# Patient Record
Sex: FEMALE | Race: BLACK OR AFRICAN AMERICAN | Employment: OTHER | URBAN - METROPOLITAN AREA
[De-identification: names, ages, dates, MRNs, and addresses within clinical notes are randomized per-mention and may not be internally consistent; named-entity substitution may affect disease eponyms.]

---

## 2017-02-16 PROBLEM — I50.33 ACUTE ON CHRONIC DIASTOLIC CHF (CONGESTIVE HEART FAILURE) (HCC): Status: ACTIVE | Noted: 2017-02-16

## 2017-02-17 PROBLEM — R79.89 ELEVATED TROPONIN: Status: ACTIVE | Noted: 2017-02-17

## 2017-02-17 PROBLEM — R77.8 ELEVATED TROPONIN: Status: ACTIVE | Noted: 2017-02-17

## 2017-02-17 PROBLEM — D61.818 PANCYTOPENIA (HCC): Status: ACTIVE | Noted: 2017-02-17

## 2017-02-22 PROBLEM — E11.29 TYPE II DIABETES MELLITUS WITH RENAL MANIFESTATIONS (HCC): Status: ACTIVE | Noted: 2017-02-22

## 2017-11-07 PROBLEM — E11.649 HYPOGLYCEMIA UNAWARENESS ASSOCIATED WITH TYPE 2 DIABETES MELLITUS (HCC): Status: ACTIVE | Noted: 2017-11-07

## 2017-11-07 PROBLEM — E10.649 HYPOGLYCEMIA UNAWARENESS IN TYPE 1 DIABETES MELLITUS (HCC): Status: ACTIVE | Noted: 2017-11-07

## 2017-11-09 PROBLEM — E87.5 HYPERKALEMIA, DIMINISHED RENAL EXCRETION: Status: ACTIVE | Noted: 2017-11-09

## 2018-04-05 RX ORDER — SEVELAMER CARBONATE 800 MG/1
1 TABLET, FILM COATED ORAL
COMMUNITY
End: 2021-05-19

## 2018-04-10 ENCOUNTER — HOSPITAL ENCOUNTER (OUTPATIENT)
Age: 63
Setting detail: OUTPATIENT SURGERY
Discharge: HOME | End: 2018-04-10
Attending: OPHTHALMOLOGY | Admitting: OPHTHALMOLOGY
Payer: MEDICARE

## 2018-04-10 ENCOUNTER — ANESTHESIA EVENT (OUTPATIENT)
Dept: OPERATING ROOM | Age: 63
End: 2018-04-10
Payer: MEDICARE

## 2018-04-10 ENCOUNTER — ANESTHESIA (OUTPATIENT)
Dept: OPERATING ROOM | Age: 63
End: 2018-04-10
Payer: MEDICARE

## 2018-04-10 VITALS — SYSTOLIC BLOOD PRESSURE: 83 MMHG | OXYGEN SATURATION: 93 % | DIASTOLIC BLOOD PRESSURE: 39 MMHG

## 2018-04-10 VITALS
DIASTOLIC BLOOD PRESSURE: 50 MMHG | RESPIRATION RATE: 16 BRPM | WEIGHT: 176 LBS | HEIGHT: 70 IN | OXYGEN SATURATION: 94 % | BODY MASS INDEX: 25.2 KG/M2 | HEART RATE: 70 BPM | SYSTOLIC BLOOD PRESSURE: 115 MMHG | TEMPERATURE: 98 F

## 2018-04-10 LAB
METER GLUCOSE: 114 MG/DL (ref 70–110)
METER GLUCOSE: 88 MG/DL (ref 70–110)
POTASSIUM SERPL-SCNC: 4.8 MMOL/L (ref 3.5–5)

## 2018-04-10 PROCEDURE — 2580000003 HC RX 258: Performed by: OPHTHALMOLOGY

## 2018-04-10 PROCEDURE — 2500000003 HC RX 250 WO HCPCS: Performed by: NURSE ANESTHETIST, CERTIFIED REGISTERED

## 2018-04-10 PROCEDURE — 2720000010 HC SURG SUPPLY STERILE: Performed by: OPHTHALMOLOGY

## 2018-04-10 PROCEDURE — 3600000013 HC SURGERY LEVEL 3 ADDTL 15MIN: Performed by: OPHTHALMOLOGY

## 2018-04-10 PROCEDURE — 6370000000 HC RX 637 (ALT 250 FOR IP): Performed by: OPHTHALMOLOGY

## 2018-04-10 PROCEDURE — 6360000002 HC RX W HCPCS: Performed by: NURSE ANESTHETIST, CERTIFIED REGISTERED

## 2018-04-10 PROCEDURE — 82962 GLUCOSE BLOOD TEST: CPT

## 2018-04-10 PROCEDURE — 3700000000 HC ANESTHESIA ATTENDED CARE: Performed by: OPHTHALMOLOGY

## 2018-04-10 PROCEDURE — 7100000011 HC PHASE II RECOVERY - ADDTL 15 MIN: Performed by: OPHTHALMOLOGY

## 2018-04-10 PROCEDURE — 2500000003 HC RX 250 WO HCPCS: Performed by: OPHTHALMOLOGY

## 2018-04-10 PROCEDURE — 3700000001 HC ADD 15 MINUTES (ANESTHESIA): Performed by: OPHTHALMOLOGY

## 2018-04-10 PROCEDURE — 3600000003 HC SURGERY LEVEL 3 BASE: Performed by: OPHTHALMOLOGY

## 2018-04-10 PROCEDURE — 7100000010 HC PHASE II RECOVERY - FIRST 15 MIN: Performed by: OPHTHALMOLOGY

## 2018-04-10 PROCEDURE — 36415 COLL VENOUS BLD VENIPUNCTURE: CPT

## 2018-04-10 PROCEDURE — 6360000002 HC RX W HCPCS: Performed by: OPHTHALMOLOGY

## 2018-04-10 PROCEDURE — 84132 ASSAY OF SERUM POTASSIUM: CPT

## 2018-04-10 RX ORDER — ONDANSETRON 2 MG/ML
INJECTION INTRAMUSCULAR; INTRAVENOUS PRN
Status: DISCONTINUED | OUTPATIENT
Start: 2018-04-10 | End: 2018-04-10 | Stop reason: SDUPTHER

## 2018-04-10 RX ORDER — CEFAZOLIN SODIUM 1 G/3ML
INJECTION, POWDER, FOR SOLUTION INTRAMUSCULAR; INTRAVENOUS PRN
Status: DISCONTINUED | OUTPATIENT
Start: 2018-04-10 | End: 2018-04-10 | Stop reason: HOSPADM

## 2018-04-10 RX ORDER — PROPOFOL 10 MG/ML
INJECTION, EMULSION INTRAVENOUS PRN
Status: DISCONTINUED | OUTPATIENT
Start: 2018-04-10 | End: 2018-04-10 | Stop reason: SDUPTHER

## 2018-04-10 RX ORDER — ONDANSETRON 2 MG/ML
4 INJECTION INTRAMUSCULAR; INTRAVENOUS EVERY 6 HOURS PRN
Status: CANCELLED | OUTPATIENT
Start: 2018-04-10

## 2018-04-10 RX ORDER — SODIUM CHLORIDE 0.9 % (FLUSH) 0.9 %
10 SYRINGE (ML) INJECTION PRN
Status: CANCELLED | OUTPATIENT
Start: 2018-04-10

## 2018-04-10 RX ORDER — DEXTROSE MONOHYDRATE 25 G/50ML
12.5 INJECTION, SOLUTION INTRAVENOUS ONCE
Status: DISCONTINUED | OUTPATIENT
Start: 2018-04-10 | End: 2018-04-10 | Stop reason: HOSPADM

## 2018-04-10 RX ORDER — DEXAMETHASONE SODIUM PHOSPHATE 10 MG/ML
INJECTION, SOLUTION INTRAMUSCULAR; INTRAVENOUS PRN
Status: DISCONTINUED | OUTPATIENT
Start: 2018-04-10 | End: 2018-04-10 | Stop reason: HOSPADM

## 2018-04-10 RX ORDER — ATROPINE SULFATE 10 MG/ML
SOLUTION/ DROPS OPHTHALMIC PRN
Status: DISCONTINUED | OUTPATIENT
Start: 2018-04-10 | End: 2018-04-10 | Stop reason: HOSPADM

## 2018-04-10 RX ORDER — HEPARIN SODIUM (PORCINE) LOCK FLUSH IV SOLN 100 UNIT/ML 100 UNIT/ML
SOLUTION INTRAVENOUS
Status: DISCONTINUED
Start: 2018-04-10 | End: 2018-04-10 | Stop reason: HOSPADM

## 2018-04-10 RX ORDER — LIDOCAINE HYDROCHLORIDE 20 MG/ML
INJECTION, SOLUTION INFILTRATION; PERINEURAL PRN
Status: DISCONTINUED | OUTPATIENT
Start: 2018-04-10 | End: 2018-04-10 | Stop reason: SDUPTHER

## 2018-04-10 RX ORDER — ACETAMINOPHEN 325 MG/1
650 TABLET ORAL EVERY 4 HOURS PRN
Status: CANCELLED | OUTPATIENT
Start: 2018-04-10

## 2018-04-10 RX ORDER — SODIUM CHLORIDE 0.9 % (FLUSH) 0.9 %
10 SYRINGE (ML) INJECTION EVERY 12 HOURS SCHEDULED
Status: CANCELLED | OUTPATIENT
Start: 2018-04-10

## 2018-04-10 RX ORDER — SODIUM CHLORIDE 0.9 % (FLUSH) 0.9 %
10 SYRINGE (ML) INJECTION EVERY 12 HOURS SCHEDULED
Status: DISCONTINUED | OUTPATIENT
Start: 2018-04-10 | End: 2018-04-10 | Stop reason: HOSPADM

## 2018-04-10 RX ORDER — SODIUM CHLORIDE 0.9 % (FLUSH) 0.9 %
10 SYRINGE (ML) INJECTION PRN
Status: DISCONTINUED | OUTPATIENT
Start: 2018-04-10 | End: 2018-04-10 | Stop reason: HOSPADM

## 2018-04-10 RX ORDER — FLURBIPROFEN SODIUM 0.3 MG/ML
1 SOLUTION/ DROPS OPHTHALMIC
Status: COMPLETED | OUTPATIENT
Start: 2018-04-10 | End: 2018-04-10

## 2018-04-10 RX ORDER — ATROPINE SULFATE 10 MG/ML
1 SOLUTION/ DROPS OPHTHALMIC
Status: COMPLETED | OUTPATIENT
Start: 2018-04-10 | End: 2018-04-10

## 2018-04-10 RX ORDER — SODIUM CHLORIDE 9 MG/ML
INJECTION, SOLUTION INTRAVENOUS CONTINUOUS
Status: DISCONTINUED | OUTPATIENT
Start: 2018-04-10 | End: 2018-04-10 | Stop reason: HOSPADM

## 2018-04-10 RX ORDER — PHENYLEPHRINE HYDROCHLORIDE 100 MG/ML
1 SOLUTION/ DROPS OPHTHALMIC
Status: COMPLETED | OUTPATIENT
Start: 2018-04-10 | End: 2018-04-10

## 2018-04-10 RX ORDER — DEXTROSE MONOHYDRATE 25 G/50ML
INJECTION, SOLUTION INTRAVENOUS
Status: DISCONTINUED
Start: 2018-04-10 | End: 2018-04-10 | Stop reason: HOSPADM

## 2018-04-10 RX ADMIN — PROPOFOL 80 MG: 10 INJECTION, EMULSION INTRAVENOUS at 08:46

## 2018-04-10 RX ADMIN — Medication 1 DROP: at 06:45

## 2018-04-10 RX ADMIN — Medication 1 DROP: at 06:50

## 2018-04-10 RX ADMIN — Medication 1 DROP: at 06:55

## 2018-04-10 RX ADMIN — ONDANSETRON 4 MG: 2 INJECTION, SOLUTION INTRAMUSCULAR; INTRAVENOUS at 08:47

## 2018-04-10 RX ADMIN — ATROPINE SULFATE 1 DROP: 10 SOLUTION/ DROPS OPHTHALMIC at 06:55

## 2018-04-10 RX ADMIN — ATROPINE SULFATE 1 DROP: 10 SOLUTION/ DROPS OPHTHALMIC at 06:50

## 2018-04-10 RX ADMIN — PHENYLEPHRINE HYDROCHLORIDE 1 DROP: 100 SOLUTION/ DROPS OPHTHALMIC at 06:55

## 2018-04-10 RX ADMIN — SODIUM CHLORIDE: 9 INJECTION, SOLUTION INTRAVENOUS at 08:45

## 2018-04-10 RX ADMIN — ATROPINE SULFATE 1 DROP: 10 SOLUTION/ DROPS OPHTHALMIC at 06:45

## 2018-04-10 RX ADMIN — LIDOCAINE HYDROCHLORIDE 40 MG: 20 INJECTION, SOLUTION INFILTRATION; PERINEURAL at 08:46

## 2018-04-10 RX ADMIN — PHENYLEPHRINE HYDROCHLORIDE 1 DROP: 100 SOLUTION/ DROPS OPHTHALMIC at 06:50

## 2018-04-10 RX ADMIN — PHENYLEPHRINE HYDROCHLORIDE 1 DROP: 100 SOLUTION/ DROPS OPHTHALMIC at 06:45

## 2018-04-10 ASSESSMENT — PAIN SCALES - GENERAL
PAINLEVEL_OUTOF10: 0
PAINLEVEL_OUTOF10: 0

## 2018-05-08 RX ORDER — DOCUSATE SODIUM 100 MG/1
CAPSULE, LIQUID FILLED ORAL
Refills: 6 | COMMUNITY
Start: 2018-03-08 | End: 2018-06-21

## 2018-05-13 ENCOUNTER — PREP FOR PROCEDURE (OUTPATIENT)
Dept: VASCULAR SURGERY | Age: 63
End: 2018-05-13

## 2018-05-13 RX ORDER — SODIUM CHLORIDE 0.9 % (FLUSH) 0.9 %
10 SYRINGE (ML) INJECTION PRN
Status: CANCELLED | OUTPATIENT
Start: 2018-05-13

## 2018-05-13 RX ORDER — SODIUM CHLORIDE 9 MG/ML
INJECTION, SOLUTION INTRAVENOUS CONTINUOUS
Status: CANCELLED | OUTPATIENT
Start: 2018-05-13

## 2018-05-13 RX ORDER — SODIUM CHLORIDE 0.9 % (FLUSH) 0.9 %
10 SYRINGE (ML) INJECTION EVERY 12 HOURS SCHEDULED
Status: CANCELLED | OUTPATIENT
Start: 2018-05-13

## 2018-05-14 ENCOUNTER — ANESTHESIA EVENT (OUTPATIENT)
Dept: OPERATING ROOM | Age: 63
End: 2018-05-14
Payer: MEDICARE

## 2018-05-15 ENCOUNTER — HOSPITAL ENCOUNTER (OUTPATIENT)
Age: 63
Setting detail: OUTPATIENT SURGERY
Discharge: HOME | End: 2018-05-15
Attending: SPECIALIST | Admitting: SPECIALIST
Payer: MEDICARE

## 2018-05-15 ENCOUNTER — ANESTHESIA (OUTPATIENT)
Dept: OPERATING ROOM | Age: 63
End: 2018-05-15
Payer: MEDICARE

## 2018-05-15 VITALS
TEMPERATURE: 97.5 F | HEIGHT: 70 IN | BODY MASS INDEX: 25.2 KG/M2 | WEIGHT: 176 LBS | SYSTOLIC BLOOD PRESSURE: 146 MMHG | DIASTOLIC BLOOD PRESSURE: 72 MMHG | HEART RATE: 62 BPM | OXYGEN SATURATION: 95 % | RESPIRATION RATE: 18 BRPM

## 2018-05-15 VITALS — DIASTOLIC BLOOD PRESSURE: 66 MMHG | OXYGEN SATURATION: 100 % | SYSTOLIC BLOOD PRESSURE: 137 MMHG

## 2018-05-15 DIAGNOSIS — I77.0 AVF (ARTERIOVENOUS FISTULA) (HCC): Primary | ICD-10-CM

## 2018-05-15 LAB
ANION GAP SERPL CALCULATED.3IONS-SCNC: 13 MMOL/L (ref 7–16)
APTT: 30.9 SEC (ref 24.5–35.1)
BASOPHILS ABSOLUTE: 0.04 E9/L (ref 0–0.2)
BASOPHILS RELATIVE PERCENT: 0.9 % (ref 0–2)
BUN BLDV-MCNC: 47 MG/DL (ref 8–23)
CALCIUM SERPL-MCNC: 8.5 MG/DL (ref 8.6–10.2)
CHLORIDE BLD-SCNC: 95 MMOL/L (ref 98–107)
CO2: 31 MMOL/L (ref 22–29)
CREAT SERPL-MCNC: 4.2 MG/DL (ref 0.5–1)
EKG ATRIAL RATE: 62 BPM
EKG P AXIS: 28 DEGREES
EKG P-R INTERVAL: 182 MS
EKG Q-T INTERVAL: 452 MS
EKG QRS DURATION: 84 MS
EKG QTC CALCULATION (BAZETT): 458 MS
EKG R AXIS: -4 DEGREES
EKG T AXIS: 45 DEGREES
EKG VENTRICULAR RATE: 62 BPM
EOSINOPHILS ABSOLUTE: 0.15 E9/L (ref 0.05–0.5)
EOSINOPHILS RELATIVE PERCENT: 3.4 % (ref 0–6)
GFR AFRICAN AMERICAN: 13
GFR NON-AFRICAN AMERICAN: 13 ML/MIN/1.73
GLUCOSE BLD-MCNC: 98 MG/DL (ref 74–109)
HCT VFR BLD CALC: 34.4 % (ref 34–48)
HEMOGLOBIN: 10.5 G/DL (ref 11.5–15.5)
IMMATURE GRANULOCYTES #: 0.02 E9/L
IMMATURE GRANULOCYTES %: 0.5 % (ref 0–5)
INR BLD: 0.9
LYMPHOCYTES ABSOLUTE: 1.32 E9/L (ref 1.5–4)
LYMPHOCYTES RELATIVE PERCENT: 30.3 % (ref 20–42)
MCH RBC QN AUTO: 27.1 PG (ref 26–35)
MCHC RBC AUTO-ENTMCNC: 30.5 % (ref 32–34.5)
MCV RBC AUTO: 88.9 FL (ref 80–99.9)
METER GLUCOSE: 101 MG/DL (ref 70–110)
MONOCYTES ABSOLUTE: 0.69 E9/L (ref 0.1–0.95)
MONOCYTES RELATIVE PERCENT: 15.8 % (ref 2–12)
NEUTROPHILS ABSOLUTE: 2.14 E9/L (ref 1.8–7.3)
NEUTROPHILS RELATIVE PERCENT: 49.1 % (ref 43–80)
PDW BLD-RTO: 15.5 FL (ref 11.5–15)
PLATELET # BLD: 193 E9/L (ref 130–450)
PMV BLD AUTO: 10.5 FL (ref 7–12)
POTASSIUM REFLEX MAGNESIUM: 4.8 MMOL/L (ref 3.5–5)
PROTHROMBIN TIME: 10.8 SEC (ref 9.3–12.4)
RBC # BLD: 3.87 E12/L (ref 3.5–5.5)
SODIUM BLD-SCNC: 139 MMOL/L (ref 132–146)
WBC # BLD: 4.4 E9/L (ref 4.5–11.5)

## 2018-05-15 PROCEDURE — 7100000011 HC PHASE II RECOVERY - ADDTL 15 MIN: Performed by: SPECIALIST

## 2018-05-15 PROCEDURE — 3600000013 HC SURGERY LEVEL 3 ADDTL 15MIN: Performed by: SPECIALIST

## 2018-05-15 PROCEDURE — 6360000002 HC RX W HCPCS: Performed by: SPECIALIST

## 2018-05-15 PROCEDURE — 6360000002 HC RX W HCPCS

## 2018-05-15 PROCEDURE — 3700000000 HC ANESTHESIA ATTENDED CARE: Performed by: SPECIALIST

## 2018-05-15 PROCEDURE — 6370000000 HC RX 637 (ALT 250 FOR IP)

## 2018-05-15 PROCEDURE — 6360000002 HC RX W HCPCS: Performed by: ANESTHESIOLOGY

## 2018-05-15 PROCEDURE — 2580000003 HC RX 258: Performed by: SPECIALIST

## 2018-05-15 PROCEDURE — 85610 PROTHROMBIN TIME: CPT

## 2018-05-15 PROCEDURE — 36415 COLL VENOUS BLD VENIPUNCTURE: CPT

## 2018-05-15 PROCEDURE — 85730 THROMBOPLASTIN TIME PARTIAL: CPT

## 2018-05-15 PROCEDURE — 64415 NJX AA&/STRD BRCH PLXS IMG: CPT | Performed by: ANESTHESIOLOGY

## 2018-05-15 PROCEDURE — 2500000003 HC RX 250 WO HCPCS: Performed by: SPECIALIST

## 2018-05-15 PROCEDURE — 2500000003 HC RX 250 WO HCPCS

## 2018-05-15 PROCEDURE — 3600000003 HC SURGERY LEVEL 3 BASE: Performed by: SPECIALIST

## 2018-05-15 PROCEDURE — 85025 COMPLETE CBC W/AUTO DIFF WBC: CPT

## 2018-05-15 PROCEDURE — 3700000001 HC ADD 15 MINUTES (ANESTHESIA): Performed by: SPECIALIST

## 2018-05-15 PROCEDURE — 80048 BASIC METABOLIC PNL TOTAL CA: CPT

## 2018-05-15 PROCEDURE — 82962 GLUCOSE BLOOD TEST: CPT

## 2018-05-15 PROCEDURE — 7100000010 HC PHASE II RECOVERY - FIRST 15 MIN: Performed by: SPECIALIST

## 2018-05-15 RX ORDER — MIDAZOLAM HYDROCHLORIDE 1 MG/ML
1 INJECTION INTRAMUSCULAR; INTRAVENOUS EVERY 5 MIN PRN
Status: DISCONTINUED | OUTPATIENT
Start: 2018-05-15 | End: 2018-05-15 | Stop reason: HOSPADM

## 2018-05-15 RX ORDER — GLYCOPYRROLATE 0.6MG/3ML
SYRINGE (ML) INTRAVENOUS PRN
Status: DISCONTINUED | OUTPATIENT
Start: 2018-05-15 | End: 2018-05-15 | Stop reason: SDUPTHER

## 2018-05-15 RX ORDER — MORPHINE SULFATE 2 MG/ML
1 INJECTION, SOLUTION INTRAMUSCULAR; INTRAVENOUS EVERY 5 MIN PRN
Status: DISCONTINUED | OUTPATIENT
Start: 2018-05-15 | End: 2018-05-15 | Stop reason: HOSPADM

## 2018-05-15 RX ORDER — FENTANYL CITRATE 50 UG/ML
INJECTION, SOLUTION INTRAMUSCULAR; INTRAVENOUS PRN
Status: DISCONTINUED | OUTPATIENT
Start: 2018-05-15 | End: 2018-05-15 | Stop reason: SDUPTHER

## 2018-05-15 RX ORDER — HYDROCODONE BITARTRATE AND ACETAMINOPHEN 5; 325 MG/1; MG/1
1 TABLET ORAL
Status: COMPLETED | OUTPATIENT
Start: 2018-05-15 | End: 2018-05-15

## 2018-05-15 RX ORDER — PROMETHAZINE HYDROCHLORIDE 25 MG/ML
25 INJECTION, SOLUTION INTRAMUSCULAR; INTRAVENOUS PRN
Status: DISCONTINUED | OUTPATIENT
Start: 2018-05-15 | End: 2018-05-15 | Stop reason: HOSPADM

## 2018-05-15 RX ORDER — LIDOCAINE HYDROCHLORIDE 10 MG/ML
INJECTION, SOLUTION INFILTRATION; PERINEURAL PRN
Status: DISCONTINUED | OUTPATIENT
Start: 2018-05-15 | End: 2018-05-15 | Stop reason: HOSPADM

## 2018-05-15 RX ORDER — HYDROCODONE BITARTRATE AND ACETAMINOPHEN 5; 325 MG/1; MG/1
TABLET ORAL
Status: COMPLETED
Start: 2018-05-15 | End: 2018-05-15

## 2018-05-15 RX ORDER — MEPERIDINE HYDROCHLORIDE 50 MG/ML
12.5 INJECTION INTRAMUSCULAR; INTRAVENOUS; SUBCUTANEOUS EVERY 5 MIN PRN
Status: DISCONTINUED | OUTPATIENT
Start: 2018-05-15 | End: 2018-05-15 | Stop reason: HOSPADM

## 2018-05-15 RX ORDER — SODIUM CHLORIDE 9 MG/ML
INJECTION, SOLUTION INTRAVENOUS CONTINUOUS
Status: DISCONTINUED | OUTPATIENT
Start: 2018-05-15 | End: 2018-05-15 | Stop reason: HOSPADM

## 2018-05-15 RX ORDER — LABETALOL HYDROCHLORIDE 5 MG/ML
5 INJECTION, SOLUTION INTRAVENOUS EVERY 10 MIN PRN
Status: DISCONTINUED | OUTPATIENT
Start: 2018-05-15 | End: 2018-05-15 | Stop reason: HOSPADM

## 2018-05-15 RX ORDER — HYDROCODONE BITARTRATE AND ACETAMINOPHEN 5; 325 MG/1; MG/1
1 TABLET ORAL EVERY 6 HOURS PRN
Qty: 10 TABLET | Refills: 0 | Status: SHIPPED | OUTPATIENT
Start: 2018-05-15 | End: 2018-05-18

## 2018-05-15 RX ORDER — PROPOFOL 10 MG/ML
INJECTION, EMULSION INTRAVENOUS CONTINUOUS PRN
Status: DISCONTINUED | OUTPATIENT
Start: 2018-05-15 | End: 2018-05-15 | Stop reason: SDUPTHER

## 2018-05-15 RX ORDER — SODIUM CHLORIDE 0.9 % (FLUSH) 0.9 %
10 SYRINGE (ML) INJECTION PRN
Status: DISCONTINUED | OUTPATIENT
Start: 2018-05-15 | End: 2018-05-15 | Stop reason: HOSPADM

## 2018-05-15 RX ORDER — SODIUM CHLORIDE 0.9 % (FLUSH) 0.9 %
10 SYRINGE (ML) INJECTION EVERY 12 HOURS SCHEDULED
Status: DISCONTINUED | OUTPATIENT
Start: 2018-05-15 | End: 2018-05-15 | Stop reason: HOSPADM

## 2018-05-15 RX ORDER — HEPARIN SODIUM 10000 [USP'U]/ML
INJECTION, SOLUTION INTRAVENOUS; SUBCUTANEOUS PRN
Status: DISCONTINUED | OUTPATIENT
Start: 2018-05-15 | End: 2018-05-15 | Stop reason: HOSPADM

## 2018-05-15 RX ORDER — MORPHINE SULFATE 2 MG/ML
2 INJECTION, SOLUTION INTRAMUSCULAR; INTRAVENOUS EVERY 5 MIN PRN
Status: DISCONTINUED | OUTPATIENT
Start: 2018-05-15 | End: 2018-05-15 | Stop reason: HOSPADM

## 2018-05-15 RX ORDER — FENTANYL CITRATE 50 UG/ML
50 INJECTION, SOLUTION INTRAMUSCULAR; INTRAVENOUS ONCE
Status: COMPLETED | OUTPATIENT
Start: 2018-05-15 | End: 2018-05-15

## 2018-05-15 RX ADMIN — FENTANYL CITRATE 50 MCG: 50 INJECTION, SOLUTION INTRAMUSCULAR; INTRAVENOUS at 07:33

## 2018-05-15 RX ADMIN — MEPIVACAINE HYDROCHLORIDE 300 MG: 10 INJECTION, SOLUTION EPIDURAL; INFILTRATION at 07:05

## 2018-05-15 RX ADMIN — CEFAZOLIN SODIUM 2 G: 2 SOLUTION INTRAVENOUS at 07:33

## 2018-05-15 RX ADMIN — MIDAZOLAM 1 MG: 1 INJECTION INTRAMUSCULAR; INTRAVENOUS at 07:05

## 2018-05-15 RX ADMIN — FENTANYL CITRATE 50 MCG: 50 INJECTION INTRAMUSCULAR; INTRAVENOUS at 07:05

## 2018-05-15 RX ADMIN — HYDROCODONE BITARTRATE AND ACETAMINOPHEN 1 TABLET: 5; 325 TABLET ORAL at 10:15

## 2018-05-15 RX ADMIN — SODIUM CHLORIDE: 9 INJECTION, SOLUTION INTRAVENOUS at 07:27

## 2018-05-15 RX ADMIN — Medication 0.1 MG: at 08:21

## 2018-05-15 RX ADMIN — PROPOFOL 50 MCG/KG/MIN: 10 INJECTION, EMULSION INTRAVENOUS at 07:33

## 2018-05-15 ASSESSMENT — PULMONARY FUNCTION TESTS
PIF_VALUE: 0
PIF_VALUE: 1
PIF_VALUE: 0

## 2018-05-15 ASSESSMENT — PAIN SCALES - GENERAL
PAINLEVEL_OUTOF10: 7
PAINLEVEL_OUTOF10: 0
PAINLEVEL_OUTOF10: 5
PAINLEVEL_OUTOF10: 0

## 2018-05-15 ASSESSMENT — PAIN - FUNCTIONAL ASSESSMENT: PAIN_FUNCTIONAL_ASSESSMENT: 0-10

## 2018-05-15 ASSESSMENT — PAIN DESCRIPTION - ORIENTATION
ORIENTATION: LEFT;UPPER
ORIENTATION: LEFT;UPPER

## 2018-05-15 ASSESSMENT — PAIN DESCRIPTION - LOCATION
LOCATION: ARM
LOCATION: ARM

## 2018-05-15 ASSESSMENT — PAIN DESCRIPTION - PAIN TYPE
TYPE: NEUROPATHIC PAIN
TYPE: SURGICAL PAIN

## 2018-05-15 ASSESSMENT — PAIN DESCRIPTION - DESCRIPTORS: DESCRIPTORS: ACHING;BURNING

## 2018-05-15 ASSESSMENT — PAIN DESCRIPTION - PROGRESSION: CLINICAL_PROGRESSION: GRADUALLY IMPROVING

## 2018-05-16 PROCEDURE — 76942 ECHO GUIDE FOR BIOPSY: CPT | Performed by: ANESTHESIOLOGY

## 2018-05-24 DIAGNOSIS — I73.9 PVD (PERIPHERAL VASCULAR DISEASE) (HCC): Primary | ICD-10-CM

## 2018-06-21 ENCOUNTER — OFFICE VISIT (OUTPATIENT)
Dept: FAMILY MEDICINE CLINIC | Age: 63
End: 2018-06-21
Payer: COMMERCIAL

## 2018-06-21 VITALS
HEIGHT: 70 IN | SYSTOLIC BLOOD PRESSURE: 98 MMHG | RESPIRATION RATE: 18 BRPM | BODY MASS INDEX: 26.2 KG/M2 | HEART RATE: 67 BPM | DIASTOLIC BLOOD PRESSURE: 47 MMHG | WEIGHT: 183 LBS

## 2018-06-21 DIAGNOSIS — N18.4 CKD (CHRONIC KIDNEY DISEASE) STAGE 4, GFR 15-29 ML/MIN (HCC): ICD-10-CM

## 2018-06-21 DIAGNOSIS — M51.36 LUMBAR DEGENERATIVE DISC DISEASE: Primary | Chronic | ICD-10-CM

## 2018-06-21 DIAGNOSIS — K59.00 CONSTIPATION, UNSPECIFIED CONSTIPATION TYPE: ICD-10-CM

## 2018-06-21 DIAGNOSIS — N18.4 TYPE 2 DIABETES MELLITUS WITH STAGE 4 CHRONIC KIDNEY DISEASE, UNSPECIFIED LONG TERM INSULIN USE STATUS: ICD-10-CM

## 2018-06-21 DIAGNOSIS — E11.22 TYPE 2 DIABETES MELLITUS WITH STAGE 4 CHRONIC KIDNEY DISEASE, UNSPECIFIED LONG TERM INSULIN USE STATUS: ICD-10-CM

## 2018-06-21 DIAGNOSIS — I15.0 RENOVASCULAR HYPERTENSION: ICD-10-CM

## 2018-06-21 LAB — HBA1C MFR BLD: 6.2 %

## 2018-06-21 PROCEDURE — G8419 CALC BMI OUT NRM PARAM NOF/U: HCPCS | Performed by: FAMILY MEDICINE

## 2018-06-21 PROCEDURE — 3044F HG A1C LEVEL LT 7.0%: CPT | Performed by: FAMILY MEDICINE

## 2018-06-21 PROCEDURE — G8598 ASA/ANTIPLAT THER USED: HCPCS | Performed by: FAMILY MEDICINE

## 2018-06-21 PROCEDURE — G8427 DOCREV CUR MEDS BY ELIG CLIN: HCPCS | Performed by: FAMILY MEDICINE

## 2018-06-21 PROCEDURE — 1036F TOBACCO NON-USER: CPT | Performed by: FAMILY MEDICINE

## 2018-06-21 PROCEDURE — 99213 OFFICE O/P EST LOW 20 MIN: CPT | Performed by: FAMILY MEDICINE

## 2018-06-21 PROCEDURE — 2022F DILAT RTA XM EVC RTNOPTHY: CPT | Performed by: FAMILY MEDICINE

## 2018-06-21 PROCEDURE — 3017F COLORECTAL CA SCREEN DOC REV: CPT | Performed by: FAMILY MEDICINE

## 2018-06-21 PROCEDURE — 83036 HEMOGLOBIN GLYCOSYLATED A1C: CPT | Performed by: FAMILY MEDICINE

## 2018-06-21 RX ORDER — METOCLOPRAMIDE 10 MG/1
TABLET ORAL
Refills: 2 | COMMUNITY
Start: 2018-04-23 | End: 2019-04-29

## 2018-06-21 RX ORDER — OMEPRAZOLE 20 MG/1
CAPSULE, DELAYED RELEASE ORAL
Refills: 5 | COMMUNITY
Start: 2018-04-23 | End: 2019-04-29 | Stop reason: SDUPTHER

## 2018-06-21 RX ORDER — INSULIN GLARGINE 100 [IU]/ML
10 INJECTION, SOLUTION SUBCUTANEOUS NIGHTLY
Qty: 1 VIAL | Refills: 3 | Status: CANCELLED | OUTPATIENT
Start: 2018-06-21

## 2018-06-21 ASSESSMENT — PATIENT HEALTH QUESTIONNAIRE - PHQ9
SUM OF ALL RESPONSES TO PHQ9 QUESTIONS 1 & 2: 0
2. FEELING DOWN, DEPRESSED OR HOPELESS: 0
SUM OF ALL RESPONSES TO PHQ QUESTIONS 1-9: 0
1. LITTLE INTEREST OR PLEASURE IN DOING THINGS: 0

## 2018-06-21 ASSESSMENT — ENCOUNTER SYMPTOMS
SHORTNESS OF BREATH: 0
BLOOD IN STOOL: 0
CONSTIPATION: 1
BACK PAIN: 1
WHEEZING: 0
CHEST TIGHTNESS: 0

## 2018-08-01 NOTE — PROGRESS NOTES
[facility]  [address]   Dept: [phone]        Patient:  Germaine Roach,  1955    Date of Service:  18    Requesting Physician:  Selene Rose MD    Reason for Consult:      Patient presents with complaints of bilateral, lower back, left hand, bilateral leg and right shoulder pain that started several years ago    HISTORY OF PRESENT ILLNESS:      Pain is constant with varying degrees of intensity and is described as aching, burning, dull, sharp, stabbing and throbbing. Pain does radiate to both lower extremities. She  has numbness, tingling, weakness of the both lower extremities and does not have bladder or bowel dysfunction. Alleviating factors include: Norco and morphine. Aggravating factors include:  movement, walking, sitting in the chair at dialysis. She has been on anticoagulation medications to include heparin on dialysis and has not been on herbal supplements. She is diabetic. Imaging:   CT lumbar 2014 (pre-surgical) -   IMPRESSION:    1. Postsurgical changes, status post laminectomy at L3-L4 and   L4-L5 and fusion of L3-L4 and L5.   2. No evidence for recurrent disc herniation. No dural or epidural   abscess or hematoma. 3. Pedicle screws are in good position. Alignment is satisfactory. Lumbar myelogram 2014 -   IMPRESSION:   1. Severe spinal canal stenosis in L4-L5 level and moderate to   severe in L3-L4 level as described above.       2. Distraction of the joint space of the facet joints of L4-L5   with hypertrophy which explains the anterolisthesis of L4 in   relation to L5.       3. Degenerative disc disease predominant seen in the L4-L5 level.       4. Encroachment of the neural foramina predominant in the level of   L4-L5 bilaterally. 2015 EMG/NCT:    IMPRESSION:  Electrodiagnostic examination of both arms and both legs disclosed evidence  diagnostic of the followin.  Diffuse sensory motor peripheral diminished renal excretion 11/9/2017    Hypertension     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 ([address] 11/7/2017    Hypoglycemia unawareness in type 1 diabetes mellitus ([address] 11/7/2017    Insulin dependent type 2 diabetes mellitus ([address]     Neuropathy ([address]     feet    Obesity 3/27/2013    Osteomyelitis due to secondary diabetes ([address]     rt great toe with amputation    Patient is Quaker 11/7/2017    Refusal of blood product     patient states she dose not take blood transfusion    Ventricular hypertrophy     Vitreous hemorrhage ([address]     left eye       Past Surgical History:   Procedure Laterality Date    AMPUTATION      right great toe    ANKLE SURGERY      correction on charcot joint of right ankle    CATARACT REMOVAL      bilateral    CHOLECYSTECTOMY      CYSTOSCOPY      DIALYSIS FISTULA CREATION Left 01/31/2018    upper arm/Dr. Devon Magana    ECHO COMPL W DOP COLOR FLOW  2/14/2013         ECHO COMPLETE  9/17/2013         MASTECTOMY      right    OTHER SURGICAL HISTORY  9/27/2011    PPV, membranectomy, laser Right eye    OTHER SURGICAL HISTORY  insertion lumbar drain insertion    10/12/`14    OTHER SURGICAL HISTORY  10/22/15    percutaneous lead placement for spinal cord stimulator    OTHER SURGICAL HISTORY  11/3/15    Spinal; cord stimulator    DC AV ANAST,UP ARM BASILIC VEIN TRANSPOSIT Left 5/15/2018    TRANSPOSITION STAGE II AV FISTULA - LEFT UPPER ARM performed by Salvador Hyman MD at [facility] W/VITRECTOMY ANY METH Left 4/10/2018    PARS PLANA VITRECTOMY 25 GAUGE RETINAL DETACHMENT REPAIR air fluid exchange, endolaser performed by Hailey Brown MD at [address] [place] [address]  11/15/2017    VITRECTOMY Left 04/10/2018    PARS PLANA VITRECTOMY; RETINAL DETACHMENT REPAIR; GAS BUBBLE; LASER LEFT EYE       Prior to Admission medications    Medication Sig Start Date End

## 2018-08-02 ENCOUNTER — OFFICE VISIT (OUTPATIENT)
Dept: PAIN MANAGEMENT | Age: 63
End: 2018-08-02
Payer: COMMERCIAL

## 2018-08-02 VITALS
WEIGHT: 179 LBS | HEART RATE: 78 BPM | OXYGEN SATURATION: 98 % | SYSTOLIC BLOOD PRESSURE: 138 MMHG | RESPIRATION RATE: 18 BRPM | TEMPERATURE: 98.6 F | BODY MASS INDEX: 25.62 KG/M2 | HEIGHT: 70 IN | DIASTOLIC BLOOD PRESSURE: 70 MMHG

## 2018-08-02 DIAGNOSIS — M54.2 CERVICALGIA: ICD-10-CM

## 2018-08-02 DIAGNOSIS — M79.10 MYALGIA: ICD-10-CM

## 2018-08-02 DIAGNOSIS — M96.1 LUMBAR POST-LAMINECTOMY SYNDROME: ICD-10-CM

## 2018-08-02 DIAGNOSIS — M54.16 LUMBAR RADICULOPATHY: ICD-10-CM

## 2018-08-02 DIAGNOSIS — G89.4 CHRONIC PAIN SYNDROME: Primary | ICD-10-CM

## 2018-08-02 PROCEDURE — 99204 OFFICE O/P NEW MOD 45 MIN: CPT | Performed by: PAIN MEDICINE

## 2018-08-02 RX ORDER — OXYCODONE HYDROCHLORIDE AND ACETAMINOPHEN 5; 325 MG/1; MG/1
1 TABLET ORAL DAILY
Qty: 30 TABLET | Refills: 0 | Status: SHIPPED | OUTPATIENT
Start: 2018-08-02 | End: 2018-08-30 | Stop reason: SDUPTHER

## 2018-08-02 RX ORDER — OXYCODONE HYDROCHLORIDE AND ACETAMINOPHEN 5; 325 MG/1; MG/1
1 TABLET ORAL DAILY
Qty: 30 TABLET | Refills: 0 | Status: SHIPPED | OUTPATIENT
Start: 2018-08-02 | End: 2018-08-02 | Stop reason: SDUPTHER

## 2018-08-16 ENCOUNTER — HOSPITAL ENCOUNTER (OUTPATIENT)
Age: 63
Discharge: HOME | End: 2018-08-18
Payer: COMMERCIAL

## 2018-08-16 ENCOUNTER — HOSPITAL ENCOUNTER (OUTPATIENT)
Dept: GENERAL RADIOLOGY | Age: 63
Discharge: HOME | End: 2018-08-18
Payer: COMMERCIAL

## 2018-08-16 DIAGNOSIS — M54.2 CERVICALGIA: ICD-10-CM

## 2018-08-16 PROCEDURE — 72050 X-RAY EXAM NECK SPINE 4/5VWS: CPT

## 2018-08-16 RX ORDER — ISOSORBIDE MONONITRATE 60 MG/1
60 TABLET, EXTENDED RELEASE ORAL 2 TIMES DAILY
Qty: 60 TABLET | Refills: 5 | Status: SHIPPED | OUTPATIENT
Start: 2018-08-16 | End: 2019-02-25 | Stop reason: SDUPTHER

## 2018-08-16 RX ORDER — BUMETANIDE 2 MG/1
2 TABLET ORAL
Qty: 90 TABLET | Refills: 5 | Status: SHIPPED | OUTPATIENT
Start: 2018-08-17 | End: 2019-09-30 | Stop reason: SDUPTHER

## 2018-08-29 NOTE — PROGRESS NOTES
11/15/2017    VITRECTOMY Left 04/10/2018    PARS PLANA VITRECTOMY; RETINAL DETACHMENT REPAIR; GAS BUBBLE; LASER LEFT EYE       Prior to Admission medications    Medication Sig Start Date End Date Taking? Authorizing Provider   bumetanide (BUMEX) 2 MG tablet Take 1 tablet by mouth three times a week 8/17/18  Yes Micaela Staples MD   isosorbide mononitrate (IMDUR) 60 MG extended release tablet Take 1 tablet by mouth 2 times daily 8/16/18  Yes Micaela Staples MD   oxyCODONE-acetaminophen (PERCOCET) 5-325 MG per tablet Take 1 tablet by mouth daily for 30 days. . 8/2/18 9/1/18 Yes Ana M Duncan, DO   omeprazole (PRILOSEC) 20 MG delayed release capsule TAKE ONE CAPSULE BY MOUTH EVERY DAY AT BEDTIME 4/23/18  Yes Historical Provider, MD   metoclopramide (REGLAN) 10 MG tablet take 1/2 to 1 tablet by mouth before meals and at bedtime for nausea prevention 4/23/18  Yes Historical Provider, MD   insulin glargine (LANTUS SOLOSTAR) 100 UNIT/ML injection pen Inject 10 Units into the skin nightly 6/21/18  Yes Micaela Staples MD   sevelamer (RENVELA) 800 MG tablet Take 1 tablet by mouth 3 times daily (with meals)   Yes Historical Provider, MD   Insulin Aspart (NOVOLOG SC) Inject into the skin Sliding scale, will bring sliding scale in DOS.    Yes Historical Provider, MD   cloNIDine (CATAPRES) 0.2 MG tablet Take 1 tablet by mouth 2 times daily 11/24/17  Yes Juliano Narvaez, DO   amLODIPine (NORVASC) 10 MG tablet Take 1 tablet by mouth daily 11/25/17  Yes Zia Osuna, DO   sennosides-docusate sodium (SENOKOT-S) 8.6-50 MG tablet Take 2 tablets by mouth daily as needed for Constipation 11/24/17  Yes Juliano Narvaez, DO   iron polysaccaride (FERREX 150 FORTE PLUS)  MG CAPS Take 1 capsule by mouth daily (with breakfast) 2/24/17  Yes Rohit Nelson, DO   folic acid (FOLVITE) 1 MG tablet Take 1 mg by mouth 5 times daily   Yes Historical Provider, MD   ondansetron (ZOFRAN ODT) 4 MG disintegrating tablet Take 4 mg by mouth every 8 hours

## 2018-08-30 ENCOUNTER — OFFICE VISIT (OUTPATIENT)
Dept: PAIN MANAGEMENT | Age: 63
End: 2018-08-30
Payer: COMMERCIAL

## 2018-08-30 VITALS
SYSTOLIC BLOOD PRESSURE: 98 MMHG | DIASTOLIC BLOOD PRESSURE: 54 MMHG | RESPIRATION RATE: 16 BRPM | HEART RATE: 72 BPM | TEMPERATURE: 97.4 F

## 2018-08-30 DIAGNOSIS — M96.1 LUMBAR POST-LAMINECTOMY SYNDROME: ICD-10-CM

## 2018-08-30 DIAGNOSIS — E11.42 DIABETIC PERIPHERAL NEUROPATHY (HCC): ICD-10-CM

## 2018-08-30 DIAGNOSIS — M54.16 LUMBAR RADICULOPATHY: ICD-10-CM

## 2018-08-30 DIAGNOSIS — G89.4 CHRONIC PAIN SYNDROME: Primary | ICD-10-CM

## 2018-08-30 DIAGNOSIS — M79.10 MYALGIA: ICD-10-CM

## 2018-08-30 DIAGNOSIS — M54.2 CERVICALGIA: ICD-10-CM

## 2018-08-30 PROCEDURE — 99214 OFFICE O/P EST MOD 30 MIN: CPT | Performed by: PAIN MEDICINE

## 2018-08-30 RX ORDER — GABAPENTIN 100 MG/1
CAPSULE ORAL
Qty: 69 CAPSULE | Refills: 0 | Status: SHIPPED | OUTPATIENT
Start: 2018-08-30 | End: 2018-09-27 | Stop reason: SDUPTHER

## 2018-08-30 RX ORDER — OXYCODONE HYDROCHLORIDE AND ACETAMINOPHEN 5; 325 MG/1; MG/1
1 TABLET ORAL DAILY
Qty: 30 TABLET | Refills: 0 | Status: ON HOLD | OUTPATIENT
Start: 2018-09-01 | End: 2018-09-25 | Stop reason: HOSPADM

## 2018-09-04 NOTE — TELEPHONE ENCOUNTER
Jin Giles called in, her script of percocet did not go through. I called the pharmacy, he did find it, someone had put it on hold. Call to Jin Giles to let her know it was done.

## 2018-09-13 RX ORDER — FOLIC ACID 1 MG/1
1 TABLET ORAL
Qty: 150 TABLET | Refills: 5 | OUTPATIENT
Start: 2018-09-13

## 2018-09-13 NOTE — TELEPHONE ENCOUNTER
I have reviewed patient's labs and her folic level is normal. She is not on medication that would bring it down either. I don't believe this supplementation is necessary.    I will take this medication off patient's list.     Thank you,  Dr Yun Hardy

## 2018-09-23 ENCOUNTER — PREP FOR PROCEDURE (OUTPATIENT)
Dept: VASCULAR SURGERY | Age: 63
End: 2018-09-23

## 2018-09-23 RX ORDER — SODIUM CHLORIDE 0.9 % (FLUSH) 0.9 %
10 SYRINGE (ML) INJECTION PRN
Status: CANCELLED | OUTPATIENT
Start: 2018-09-23 | End: 2019-09-23

## 2018-09-23 RX ORDER — SODIUM CHLORIDE 0.9 % (FLUSH) 0.9 %
10 SYRINGE (ML) INJECTION EVERY 12 HOURS SCHEDULED
Status: CANCELLED | OUTPATIENT
Start: 2018-09-23 | End: 2019-09-23

## 2018-09-23 RX ORDER — SODIUM CHLORIDE 9 MG/ML
INJECTION, SOLUTION INTRAVENOUS CONTINUOUS
Status: CANCELLED | OUTPATIENT
Start: 2018-09-23 | End: 2019-09-23

## 2018-09-24 ENCOUNTER — ANESTHESIA EVENT (OUTPATIENT)
Dept: OPERATING ROOM | Age: 63
End: 2018-09-24
Payer: COMMERCIAL

## 2018-09-24 NOTE — PROGRESS NOTES
[facility] PRE-ADMISSION TESTING GENERAL INSTRUCTIONS- [facility]-phone number:[phone]    GENERAL INSTRUCTIONS  [x] Antibacterial Soap shower Night before and/or AM of Surgery  [] Curt wipe instruction sheet and wipes given. [x] Nothing by mouth after midnight, including gum, candy, mints, or water. [x] You may brush your teeth, gargle, but do NOT swallow water. []Hibiclens shower  the night before and the morning of surgery. Do not use             Hibiclens on your face or head. [x]No smoking, chewing tobacco, illegal drugs, or alcohol within 24 hours of your surgery. [x] Jewelry, valuables or body piercing's should not be brought to the hospital. All body and/or tongue piercing's must be removed prior to arriving to hospital.  ALL hair pins must be removed. [x] Do not wear makeup, lotions, powders, deodorant. Nail polish as directed by the nurse. [x] Arrange transportation to and from the hospital.  Arrange for someone to be with you for the remainder of the day and for 24 hours after your procedure due to having had anesthesia. [] Bring insurance card and photo ID.  [] Transfusion Bracelet: Please bring with you to hospital, day of surgery  [] Bring urine specimen day of surgery. Any small container is acceptable. [] Use inhalers the morning of surgery and bring with you to hospital.   []Bring copy of living will or healthcare power of  papers to be placed in your electronic record. [] CPAP/BI-PAP: Please bring your machine if you are to spend the night in the hospital.     ENDOSCOPY INSTRUCTIONS:   [] Bowel prep instructions reviewed. [] Nothing by mouth after midnight, including gum, candy, mints, or water.  [] You may brush your teeth, gargle, but do NOT swallow water. [] Do not wear makeup, lotions, powders, deodorant. Nail polish as directed by the nurse.   [] Arrange transportation to and from the hospital.  Arrange for someone to be with you for the

## 2018-09-24 NOTE — PROGRESS NOTES
Spoke to sidney at dr [facility] office notified pt is Maki Gandhion witness blood refusal permit on chart to be signed

## 2018-09-25 ENCOUNTER — ANESTHESIA (OUTPATIENT)
Dept: OPERATING ROOM | Age: 63
End: 2018-09-25
Payer: COMMERCIAL

## 2018-09-25 ENCOUNTER — HOSPITAL ENCOUNTER (OUTPATIENT)
Age: 63
Setting detail: OUTPATIENT SURGERY
Discharge: HOME | End: 2018-09-25
Attending: SPECIALIST | Admitting: SPECIALIST
Payer: COMMERCIAL

## 2018-09-25 VITALS
TEMPERATURE: 97 F | WEIGHT: 179 LBS | RESPIRATION RATE: 14 BRPM | SYSTOLIC BLOOD PRESSURE: 144 MMHG | HEART RATE: 66 BPM | BODY MASS INDEX: 25.62 KG/M2 | HEIGHT: 70 IN | DIASTOLIC BLOOD PRESSURE: 67 MMHG | OXYGEN SATURATION: 97 %

## 2018-09-25 VITALS — SYSTOLIC BLOOD PRESSURE: 131 MMHG | OXYGEN SATURATION: 100 % | DIASTOLIC BLOOD PRESSURE: 60 MMHG

## 2018-09-25 DIAGNOSIS — Z79.4 INSULIN DEPENDENT TYPE 2 DIABETES MELLITUS (HCC): Primary | ICD-10-CM

## 2018-09-25 DIAGNOSIS — E11.9 INSULIN DEPENDENT TYPE 2 DIABETES MELLITUS (HCC): Primary | ICD-10-CM

## 2018-09-25 DIAGNOSIS — I77.0 A-V FISTULA (HCC): ICD-10-CM

## 2018-09-25 LAB
ANION GAP SERPL CALCULATED.3IONS-SCNC: 12 MMOL/L (ref 7–16)
APTT: 32.5 SEC (ref 24.5–35.1)
BASOPHILS ABSOLUTE: 0.02 E9/L (ref 0–0.2)
BASOPHILS RELATIVE PERCENT: 0.5 % (ref 0–2)
BUN BLDV-MCNC: 33 MG/DL (ref 8–23)
CALCIUM SERPL-MCNC: 9.4 MG/DL (ref 8.6–10.2)
CHLORIDE BLD-SCNC: 97 MMOL/L (ref 98–107)
CO2: 32 MMOL/L (ref 22–29)
CREAT SERPL-MCNC: 4.5 MG/DL (ref 0.5–1)
EOSINOPHILS ABSOLUTE: 0.17 E9/L (ref 0.05–0.5)
EOSINOPHILS RELATIVE PERCENT: 4 % (ref 0–6)
GFR AFRICAN AMERICAN: 12
GFR NON-AFRICAN AMERICAN: 12 ML/MIN/1.73
GLUCOSE BLD-MCNC: 132 MG/DL (ref 74–109)
HCT VFR BLD CALC: 34.2 % (ref 34–48)
HEMOGLOBIN: 10.4 G/DL (ref 11.5–15.5)
IMMATURE GRANULOCYTES #: 0.01 E9/L
IMMATURE GRANULOCYTES %: 0.2 % (ref 0–5)
INR BLD: 0.9
LYMPHOCYTES ABSOLUTE: 1.16 E9/L (ref 1.5–4)
LYMPHOCYTES RELATIVE PERCENT: 27.4 % (ref 20–42)
MCH RBC QN AUTO: 28 PG (ref 26–35)
MCHC RBC AUTO-ENTMCNC: 30.4 % (ref 32–34.5)
MCV RBC AUTO: 92.2 FL (ref 80–99.9)
METER GLUCOSE: 136 MG/DL (ref 70–110)
MONOCYTES ABSOLUTE: 0.67 E9/L (ref 0.1–0.95)
MONOCYTES RELATIVE PERCENT: 15.8 % (ref 2–12)
NEUTROPHILS ABSOLUTE: 2.2 E9/L (ref 1.8–7.3)
NEUTROPHILS RELATIVE PERCENT: 52.1 % (ref 43–80)
PDW BLD-RTO: 15.2 FL (ref 11.5–15)
PLATELET # BLD: 159 E9/L (ref 130–450)
PMV BLD AUTO: 10.7 FL (ref 7–12)
POTASSIUM REFLEX MAGNESIUM: 4.7 MMOL/L (ref 3.5–5)
PROTHROMBIN TIME: 10.5 SEC (ref 9.3–12.4)
RBC # BLD: 3.71 E12/L (ref 3.5–5.5)
SODIUM BLD-SCNC: 141 MMOL/L (ref 132–146)
WBC # BLD: 4.2 E9/L (ref 4.5–11.5)

## 2018-09-25 PROCEDURE — 64415 NJX AA&/STRD BRCH PLXS IMG: CPT | Performed by: ANESTHESIOLOGY

## 2018-09-25 PROCEDURE — 6360000002 HC RX W HCPCS: Performed by: NURSE ANESTHETIST, CERTIFIED REGISTERED

## 2018-09-25 PROCEDURE — 3700000000 HC ANESTHESIA ATTENDED CARE: Performed by: SPECIALIST

## 2018-09-25 PROCEDURE — 2500000003 HC RX 250 WO HCPCS: Performed by: SPECIALIST

## 2018-09-25 PROCEDURE — 2580000003 HC RX 258: Performed by: ANESTHESIOLOGY

## 2018-09-25 PROCEDURE — 6360000002 HC RX W HCPCS: Performed by: ANESTHESIOLOGY

## 2018-09-25 PROCEDURE — 6360000002 HC RX W HCPCS: Performed by: SPECIALIST

## 2018-09-25 PROCEDURE — 7100000010 HC PHASE II RECOVERY - FIRST 15 MIN: Performed by: SPECIALIST

## 2018-09-25 PROCEDURE — 36415 COLL VENOUS BLD VENIPUNCTURE: CPT

## 2018-09-25 PROCEDURE — 85610 PROTHROMBIN TIME: CPT

## 2018-09-25 PROCEDURE — 2580000003 HC RX 258: Performed by: SPECIALIST

## 2018-09-25 PROCEDURE — 82962 GLUCOSE BLOOD TEST: CPT

## 2018-09-25 PROCEDURE — 85730 THROMBOPLASTIN TIME PARTIAL: CPT

## 2018-09-25 PROCEDURE — 2709999900 HC NON-CHARGEABLE SUPPLY: Performed by: SPECIALIST

## 2018-09-25 PROCEDURE — 6360000002 HC RX W HCPCS

## 2018-09-25 PROCEDURE — 2580000003 HC RX 258

## 2018-09-25 PROCEDURE — 3600000012 HC SURGERY LEVEL 2 ADDTL 15MIN: Performed by: SPECIALIST

## 2018-09-25 PROCEDURE — 80048 BASIC METABOLIC PNL TOTAL CA: CPT

## 2018-09-25 PROCEDURE — 85025 COMPLETE CBC W/AUTO DIFF WBC: CPT

## 2018-09-25 PROCEDURE — 7100000011 HC PHASE II RECOVERY - ADDTL 15 MIN: Performed by: SPECIALIST

## 2018-09-25 PROCEDURE — 3600000002 HC SURGERY LEVEL 2 BASE: Performed by: SPECIALIST

## 2018-09-25 PROCEDURE — 3700000001 HC ADD 15 MINUTES (ANESTHESIA): Performed by: SPECIALIST

## 2018-09-25 RX ORDER — SODIUM CHLORIDE 0.9 % (FLUSH) 0.9 %
10 SYRINGE (ML) INJECTION PRN
Status: DISCONTINUED | OUTPATIENT
Start: 2018-09-25 | End: 2018-09-25 | Stop reason: HOSPADM

## 2018-09-25 RX ORDER — FENTANYL CITRATE 50 UG/ML
50 INJECTION, SOLUTION INTRAMUSCULAR; INTRAVENOUS
Status: ACTIVE | OUTPATIENT
Start: 2018-09-25 | End: 2018-09-25

## 2018-09-25 RX ORDER — PROPOFOL 10 MG/ML
INJECTION, EMULSION INTRAVENOUS CONTINUOUS PRN
Status: DISCONTINUED | OUTPATIENT
Start: 2018-09-25 | End: 2018-09-25 | Stop reason: SDUPTHER

## 2018-09-25 RX ORDER — SODIUM CHLORIDE 9 MG/ML
INJECTION, SOLUTION INTRAVENOUS CONTINUOUS PRN
Status: DISCONTINUED | OUTPATIENT
Start: 2018-09-25 | End: 2018-09-25 | Stop reason: SDUPTHER

## 2018-09-25 RX ORDER — SODIUM CHLORIDE 9 MG/ML
INJECTION, SOLUTION INTRAVENOUS CONTINUOUS
Status: DISCONTINUED | OUTPATIENT
Start: 2018-09-25 | End: 2018-09-25 | Stop reason: HOSPADM

## 2018-09-25 RX ORDER — OXYCODONE HYDROCHLORIDE AND ACETAMINOPHEN 5; 325 MG/1; MG/1
1 TABLET ORAL EVERY 6 HOURS PRN
Qty: 28 TABLET | Refills: 0 | Status: SHIPPED | OUTPATIENT
Start: 2018-09-25 | End: 2018-09-27 | Stop reason: SDUPTHER

## 2018-09-25 RX ORDER — SODIUM CHLORIDE 0.9 % (FLUSH) 0.9 %
10 SYRINGE (ML) INJECTION EVERY 12 HOURS SCHEDULED
Status: DISCONTINUED | OUTPATIENT
Start: 2018-09-25 | End: 2018-09-25 | Stop reason: HOSPADM

## 2018-09-25 RX ORDER — FENTANYL CITRATE 50 UG/ML
INJECTION, SOLUTION INTRAMUSCULAR; INTRAVENOUS PRN
Status: DISCONTINUED | OUTPATIENT
Start: 2018-09-25 | End: 2018-09-25 | Stop reason: SDUPTHER

## 2018-09-25 RX ORDER — 0.9 % SODIUM CHLORIDE 0.9 %
15 VIAL (ML) INJECTION ONCE
Status: COMPLETED | OUTPATIENT
Start: 2018-09-25 | End: 2018-09-25

## 2018-09-25 RX ORDER — MIDAZOLAM HYDROCHLORIDE 1 MG/ML
1 INJECTION INTRAMUSCULAR; INTRAVENOUS PRN
Status: DISCONTINUED | OUTPATIENT
Start: 2018-09-25 | End: 2018-09-25 | Stop reason: HOSPADM

## 2018-09-25 RX ORDER — FENTANYL CITRATE 50 UG/ML
25 INJECTION, SOLUTION INTRAMUSCULAR; INTRAVENOUS EVERY 5 MIN PRN
Status: DISCONTINUED | OUTPATIENT
Start: 2018-09-25 | End: 2018-09-25 | Stop reason: HOSPADM

## 2018-09-25 RX ORDER — LIDOCAINE HYDROCHLORIDE AND EPINEPHRINE BITARTRATE 20; .01 MG/ML; MG/ML
INJECTION, SOLUTION SUBCUTANEOUS PRN
Status: DISCONTINUED | OUTPATIENT
Start: 2018-09-25 | End: 2018-09-25 | Stop reason: HOSPADM

## 2018-09-25 RX ORDER — FENTANYL CITRATE 50 UG/ML
50 INJECTION, SOLUTION INTRAMUSCULAR; INTRAVENOUS EVERY 5 MIN PRN
Status: DISCONTINUED | OUTPATIENT
Start: 2018-09-25 | End: 2018-09-25 | Stop reason: HOSPADM

## 2018-09-25 RX ADMIN — MEPIVACAINE HYDROCHLORIDE 15 ML: 20 INJECTION, SOLUTION EPIDURAL; INFILTRATION at 07:11

## 2018-09-25 RX ADMIN — MIDAZOLAM HYDROCHLORIDE 1 MG: 1 INJECTION, SOLUTION INTRAMUSCULAR; INTRAVENOUS at 07:09

## 2018-09-25 RX ADMIN — SODIUM CHLORIDE: 9 INJECTION, SOLUTION INTRAVENOUS at 07:18

## 2018-09-25 RX ADMIN — FENTANYL CITRATE 50 MCG: 50 INJECTION, SOLUTION INTRAMUSCULAR; INTRAVENOUS at 08:07

## 2018-09-25 RX ADMIN — Medication 2 G: at 07:27

## 2018-09-25 RX ADMIN — SODIUM CHLORIDE 15 ML: 9 INJECTION INTRAMUSCULAR; INTRAVENOUS; SUBCUTANEOUS at 07:12

## 2018-09-25 RX ADMIN — PROPOFOL 50 MCG/KG/MIN: 10 INJECTION, EMULSION INTRAVENOUS at 07:23

## 2018-09-25 RX ADMIN — FENTANYL CITRATE 50 MCG: 50 INJECTION INTRAMUSCULAR; INTRAVENOUS at 09:43

## 2018-09-25 RX ADMIN — FENTANYL CITRATE 50 MCG: 50 INJECTION, SOLUTION INTRAMUSCULAR; INTRAVENOUS at 07:09

## 2018-09-25 ASSESSMENT — PAIN DESCRIPTION - LOCATION
LOCATION: ARM

## 2018-09-25 ASSESSMENT — PULMONARY FUNCTION TESTS
PIF_VALUE: 1
PIF_VALUE: 0
PIF_VALUE: 1
PIF_VALUE: 1
PIF_VALUE: 0

## 2018-09-25 ASSESSMENT — PAIN DESCRIPTION - ORIENTATION
ORIENTATION: LEFT

## 2018-09-25 ASSESSMENT — PAIN SCALES - GENERAL
PAINLEVEL_OUTOF10: 4
PAINLEVEL_OUTOF10: 7
PAINLEVEL_OUTOF10: 2
PAINLEVEL_OUTOF10: 4
PAINLEVEL_OUTOF10: 7
PAINLEVEL_OUTOF10: 2

## 2018-09-25 ASSESSMENT — LIFESTYLE VARIABLES: SMOKING_STATUS: 0

## 2018-09-25 ASSESSMENT — PAIN DESCRIPTION - DESCRIPTORS
DESCRIPTORS: ACHING
DESCRIPTORS: ACHING;BURNING;CONSTANT
DESCRIPTORS: ACHING;BURNING

## 2018-09-25 ASSESSMENT — PAIN - FUNCTIONAL ASSESSMENT: PAIN_FUNCTIONAL_ASSESSMENT: 0-10

## 2018-09-25 NOTE — ANESTHESIA POSTPROCEDURE EVALUATION
Department of Anesthesiology  Postprocedure Note    Patient: Soham Lindsay  MRN: [id number]  YOB: 1955  Date of evaluation: 9/25/2018  Time:  1:09 PM     Procedure Summary     Date:  09/25/18 Room / Location:  [facility] / [facility]    Anesthesia Start:  0718 Anesthesia Stop:  0928    Procedure:  SUPERFICIALIZATION AV FISTULA - LEFT ARM (Left ) Diagnosis:  (CHRONIC RENAL FAILURE)    Surgeon:  Osmar Erazo MD Responsible Provider:  Miguel Cortez DO    Anesthesia Type:  MAC, regional ASA Status:  4          Anesthesia Type: MAC, regional    Nikki Phase I: Nikki Score: 10    Nikki Phase II: Nikki Score: 10    Last vitals: Reviewed and per EMR flowsheets.        Anesthesia Post Evaluation    Patient location during evaluation: PACU  Patient participation: complete - patient participated  Level of consciousness: awake and alert  Pain score: 1  Airway patency: patent  Nausea & Vomiting: no nausea and no vomiting  Complications: no  Cardiovascular status: hemodynamically stable  Respiratory status: acceptable  Hydration status: euvolemic

## 2018-09-25 NOTE — ANESTHESIA PRE PROCEDURE
Intradermal Once Demetrio Flores,            Allergies: Allergies   Allergen Reactions    Lasix [Furosemide] Other (See Comments)     States her kidneys shut down       Problem List:    Patient Active Problem List   Diagnosis Code    Diabetic retinopathy ([facility] E11.319    Malignant neoplasm of right female breast ([facility] C50.911    Atherosclerosis of native coronary artery of native heart without angina pectoris I25.10    Moderate obesity E66.8    Left ventricular hypertrophy I51.7    Herniated lumbar intervertebral disc M51.26    Lumbar degenerative disc disease M51.36    Pseudomeningocele G96.19    Lumbar radiculopathy M54.16    Lymphedema of arm I89.0    CKD (chronic kidney disease) stage 4, GFR 15-29 ml/min ([facility]) N18.4    Insulin dependent type 2 diabetes mellitus ([facility] E11.9, Z79.4    Anemia of chronic disease D63.8    Chronic diastolic CHF (congestive heart failure) ([facility]) I50.32    Neuropathy G62.9    Hypertension I10    Glaucoma H40.9    Refusal of blood product Z78.9    Acute on chronic diastolic CHF (congestive heart failure) ([facility]) I50.33    Pancytopenia ([facility]) D61.818    Elevated troponin R74.8    Type II diabetes mellitus with renal manifestations ([facility]) E11.29    Vitreous hemorrhage ([facility]) H43.10    Patient is Pentecostal Z78.9    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 ([facility] E11.649    Hyperkalemia, diminished renal excretion E87.5    Chronic pain syndrome G89.4    Lumbar post-laminectomy syndrome M96.1    Myalgia M79.1    Cervicalgia M54.2    Diabetic peripheral neuropathy ([facility]) E11.42       Past Medical History:        Diagnosis Date    Acute infection of bone (HCC)     infection of rt foot, resolved.     Anemia of chronic disease     Breast cancer ([address]     right breast, 2008/ bladder, 2006    CAD (coronary artery disease)     Chronic diastolic CHF (congestive heart failure) ([facility] 09/23/2014 9/23/14- echocardiogram revealed VITRECTOMY Left 04/10/2018    PARS PLANA VITRECTOMY; RETINAL DETACHMENT REPAIR; GAS BUBBLE; LASER LEFT EYE       Social History:    Social History   Substance Use Topics    Smoking status: Never Smoker    Smokeless tobacco: Never Used    Alcohol use No                                Counseling given: Not Answered      Vital Signs (Current):   Vitals:    09/24/18 0832 09/25/18 0622   BP:  (!) 104/55   Pulse:  75   Resp:  20   Temp:  36.6 °C (97.8 °F)   TempSrc:  Temporal   SpO2:  93%   Weight: 179 lb (81.2 kg) 179 lb (81.2 kg)   Height: 5' 10\" (1.778 m) 5' 10\" (1.778 m)                                              BP Readings from Last 3 Encounters:   09/25/18 (!) 104/55   08/30/18 (!) 98/54   08/02/18 138/70       NPO Status: Time of last liquid consumption: 2345                        Time of last solid consumption: 1830                        Date of last liquid consumption: 09/24/18                        Date of last solid food consumption: 09/24/18    BMI:   Wt Readings from Last 3 Encounters:   09/25/18 179 lb (81.2 kg)   08/02/18 179 lb (81.2 kg)   06/21/18 183 lb (83 kg)     Body mass index is 25.68 kg/m². CBC:   Lab Results   Component Value Date    WBC 4.4 05/15/2018    RBC 3.87 05/15/2018    HGB 10.5 05/15/2018    HCT 34.4 05/15/2018    MCV 88.9 05/15/2018    RDW 15.5 05/15/2018     05/15/2018       CMP:   Lab Results   Component Value Date     05/15/2018    K 4.8 05/15/2018    CL 95 05/15/2018    CO2 31 05/15/2018    BUN 47 05/15/2018    CREATININE 4.2 05/15/2018    GFRAA 13 05/15/2018    LABGLOM 13 05/15/2018    GLUCOSE 98 05/15/2018    GLUCOSE 46 04/02/2012    PROT 5.5 11/13/2017    CALCIUM 8.5 05/15/2018    BILITOT 0.7 11/13/2017    ALKPHOS 62 11/13/2017    AST 14 11/13/2017    ALT 17 11/13/2017       POC Tests: No results for input(s): POCGLU, POCNA, POCK, POCCL, POCBUN, POCHEMO, POCHCT in the last 72 hours.     Coags:   Lab Results   Component Value Date    PROTIME 10.8 05/15/2018

## 2018-09-25 NOTE — H&P
Vascular Surgery History & Physical      HPI :    Jeff Gutierrez is a 59 y.o. female who presents for elective transposition of LUE AVF. She had a left brachiobasilic fistula placed on 1/31 and a previous transposition on 05/15/18. She denies fevers or chills. Her fistula has not matured and is here for transposition. She states dialysis has been having difficult time accessing. She denies any temperature intolerance           ROS : Negative if blank [], Positive if [x]  General Vascular   [] Fevers [] Claudication (Blocks)   [] Chills [] Rest Pain   [] Weight Loss [] Tissue Loss   [] Chest Pain [] Clotting Disorder   [] SOB at rest [] Leg Swelling   [] SOB with exertion [] DVT/PE      [] Nausea    [] Vomitting [] Stroke/TIA   [] Abdominal Pain [] Focal weakness   [] Melena [] Slurred Speech   [] Hematochezia [] Vision Changes   [] Hematuria    [] Dysuria [x] Hx of Central Catheters   [] Wears Glasses/Contacts  [x] Dialysis and If so date initiated   [] Blindness     [x] Right Hand Dominant   [] Difficulty swallowing        Past Medical History:   Diagnosis Date    Acute infection of bone ([address]     infection of rt foot, resolved.     Anemia of chronic disease     Breast cancer ([address]     right breast, 2008/ bladder, 2006    CAD (coronary artery disease)     Chronic diastolic CHF (congestive heart failure) ([address] 09/23/2014 9/23/14- echocardiogram revealed moderate LV concentric hypertrophy, stage III diastolic dysfunction, mild tricuspid regurgitation    CKD (chronic kidney disease) stage 4, GFR 15-29 ml/min ([facility]     Diabetic retinopathy ([address]     Glaucoma     Hemodialysis patient ([facility]     [facility]  mon wed fri    Hyperkalemia, diminished renal excretion 11/9/2017    Hypertension     Hypoglycemia unawareness in type 1 diabetes mellitus ([address] 11/7/2017    Insulin dependent type 2 diabetes mellitus (HCC)     Neuropathy     feet    Osteomyelitis due to secondary diabetes (HCC)     rt great toe with Smoking status: Never Smoker    Smokeless tobacco: Never Used    Alcohol use No    Drug use: No    Sexual activity: No     Other Topics Concern    Not on file     Social History Narrative    No narrative on file        Family History   Problem Relation Age of Onset    Breast Cancer Mother 61    Hypertension Mother     Heart Disease Father     Prostate Cancer Father     Breast Cancer Maternal Grandmother 61       PHYSICAL EXAM:    BP (!) 118/57   Pulse 65   Temp 97.8 °F (36.6 °C) (Temporal)   Resp 12   Ht 5' 10\" (1.778 m)   Wt 179 lb (81.2 kg)   SpO2 100%   BMI 25.68 kg/m²   CONSTITUTIONAL:  awake, alert, cooperative, no apparent distress, and appears stated age  NECK:  supple, symmetrical, trachea midline,no jugular venous distension  LUNGS:  no increased work of breathing  CARDIOVASCULAR:  regular rate and rhythm  ABDOMEN:  soft, non-distended, non-tender  EXTREMITIES:    R UE Swelling absent Incisions absent       5/5 Strength    L UE Swelling absent Incisions present       5/5 Strength    R LE Edema absent     Incisions absent    Varicose veins absent    Wounds absent    normalcaprefill   5/5 Strength   Neuropathy is absent    L LE Edema absent     Incisions absent    Varicose veins absent    Wounds absent    normalcaprefill   5/5 Strength   Neuropathy is absent    R brachial 2 L brachial 2   R radial 2 L radial 2   R femoral 2 L femoral 2   R popliteal  L popliteal    R posterior tibial  L posterior tibial    R dorsalis pedis  L dorsalis pedis        LABS:    Lab Results   Component Value Date    WBC 4.2 (L) 09/25/2018    HGB 10.4 (L) 09/25/2018    HCT 34.2 09/25/2018     09/25/2018    PROTIME 10.5 09/25/2018    INR 0.9 09/25/2018    K 4.8 05/15/2018    BUN 47 (H) 05/15/2018    CREATININE 4.2 (H) 05/15/2018       RADIOLOGY:  No results found.       Assesment/Plan  59 y.o. female with LUE brachiobasilic fistula     Risks and benefits of procedure were explained and she would like to

## 2018-09-25 NOTE — OP NOTE
DATE OF PROCEDURE: 9/25/2018     SURGEON: Karen Garcia M.D.     ASSISTANT: Tyree Mesa M.D., Ph.D., PGY 1     PREOPERATIVE DIAGNOSIS: End stage renal disease     POSTOPERATIVE DIAGNOSIS: Same     OPERATION: Superficialization of L basilic AVF     ANESTHESIA: LMAC     ESTIMATED BLOOD LOSS: Minimal     COMPLICATIONS: None     DESCRIPTION OF PROCEDURE: The patient was identified and the procedure was confirmed. The left arm was prepped and draped in the usual sterile fashion. Lidocaine 2% mixed with epinephrine was used for local anesthesia. A longitudinal skin incision was made at the previous site. Dissection was carried down through the subcutaneous tissues to the venous part of the anastomosis. The incision was elongated proximally and distally to increase exposure. The vein was freed from the surround tissues. One large arterial branch was noted and ligated with 3-0 silk sutures. The vein was freed and freely mobile and was to be superficialized. Hemostasis was achieved with cautery. The wound was irrigated with sterile saline. The deep fascia was closed with 3-0 Vicryl suture in a running fashion. The fistula was laid over the fascia to be towards the skin. The skin was closed with interrupted 4-0 monocryl sutures and skin glue. Needle, sponge and instrument counts were reported as correct x2.  The patient tolerated the procedure and was transferred to the recovery area in satisfactory condition.      Dr. Erich Edge was present for entire procedure      Electronically signed by Tyree Mesa MD on 9/25/2018 at 9:28 AM

## 2018-09-26 PROBLEM — R77.8 ELEVATED TROPONIN: Status: RESOLVED | Noted: 2017-02-17 | Resolved: 2018-09-26

## 2018-09-26 PROBLEM — R79.89 ELEVATED TROPONIN: Status: RESOLVED | Noted: 2017-02-17 | Resolved: 2018-09-26

## 2018-09-26 NOTE — PROGRESS NOTES
PARS PLANA VITRECTOMY; RETINAL DETACHMENT REPAIR; GAS BUBBLE; LASER LEFT EYE       Prior to Admission medications    Medication Sig Start Date End Date Taking? Authorizing Provider   oxyCODONE-acetaminophen (PERCOCET) 5-325 MG per tablet Take 1 tablet by mouth every 6 hours as needed for Pain for up to 7 days. Intended supply: 3 days. Take lowest dose possible to manage pain. 9/25/18 10/2/18 Yes Ana Colunga MD   gabapentin (NEURONTIN) 100 MG capsule qHS x 7 days then BID x 7 days then TID thereafter. 8/30/18 9/29/18 Yes [facility],    bumetanide (BUMEX) 2 MG tablet Take 1 tablet by mouth three times a week 8/17/18  Yes Nereyda Srinivasan MD   isosorbide mononitrate (IMDUR) 60 MG extended release tablet Take 1 tablet by mouth 2 times daily 8/16/18  Yes Nereyda Srinivasan MD   omeprazole (PRILOSEC) 20 MG delayed release capsule TAKE ONE CAPSULE BY MOUTH EVERY DAY AT BEDTIME 4/23/18  Yes Historical Provider, MD   metoclopramide (REGLAN) 10 MG tablet take 1/2 to 1 tablet by mouth before meals and at bedtime for nausea prevention 4/23/18  Yes Historical Provider, MD   insulin glargine (LANTUS SOLOSTAR) 100 UNIT/ML injection pen Inject 10 Units into the skin nightly 6/21/18  Yes Nereyda Srinivasan MD   sevelamer (RENVELA) 800 MG tablet Take 1 tablet by mouth 3 times daily (with meals)   Yes Historical Provider, MD   Insulin Aspart (NOVOLOG SC) Inject into the skin Sliding scale, will bring sliding scale in DOS.    Yes Historical Provider, MD   cloNIDine (CATAPRES) 0.2 MG tablet Take 1 tablet by mouth 2 times daily 11/24/17  Yes Juliano Narvaez DO   amLODIPine (NORVASC) 10 MG tablet Take 1 tablet by mouth daily 11/25/17  Yes Juliano Narvaez DO   sennosides-docusate sodium (SENOKOT-S) 8.6-50 MG tablet Take 2 tablets by mouth daily as needed for Constipation 11/24/17  Yes Juliano Narvaez DO   folic acid (FOLVITE) 1 MG tablet Take 1 mg by mouth 5 times daily   Yes Historical Provider, MD   ondansetron (ZOFRAN ODT) 4 MG disintegrating tablet Take 4 mg by mouth every 8 hours as needed for Nausea or Vomiting   Yes Historical Provider, MD   aspirin EC 81 MG EC tablet Take 81 mg by mouth daily Last dose 9-19   Yes Historical Provider, MD   Brimonidine Tartrate-Timolol (COMBIGAN OP) Apply 1 drop to eye 2 times daily OD   Yes Historical Provider, MD   cyanocobalamin 1000 MCG tablet Take 1 tablet by mouth daily. 10/31/14  Yes Jonathan Crowe MD   darbepoetin fani-polysorbate (ARANESP) 40 MCG/0.4ML SOLN injection Inject 0.4 mLs into the skin once a week. Patient taking differently: Inject 40 mcg into the skin every 14 days  10/31/14  Yes Cassie Cruz MD   pravastatin (PRAVACHOL) 20 MG tablet Take 1 tablet by mouth daily. Patient taking differently: Take 20 mg by mouth nightly  4/9/14  Yes Lakeisha Ugarte MD   allopurinol (ZYLOPRIM) 100 MG tablet Take 100 mg by mouth daily. 9/11/13  Yes Historical Provider, MD   Bimatoprost (LUMIGAN OP) Place 1 drop into both eyes daily Indications: every evening As directed   Yes Historical Provider, MD       Allergies   Allergen Reactions    Lasix [Furosemide] Other (See Comments)     States her kidneys shut down       Social History     Social History    Marital status: Single     Spouse name: N/A    Number of children: N/A    Years of education: N/A     Occupational History    Not on file. Social History Main Topics    Smoking status: Never Smoker    Smokeless tobacco: Never Used    Alcohol use No    Drug use: No    Sexual activity: No     Other Topics Concern    Not on file     Social History Narrative    No narrative on file       Family History   Problem Relation Age of Onset    Breast Cancer Mother 61    Hypertension Mother     Heart Disease Father     Prostate Cancer Father     Breast Cancer Maternal Grandmother 60       REVIEW OF SYSTEMS:     Jason Spine denies fever/chills, chest pain, shortness of breath, new bowel or bladder complaints.  All other review of systems was negative. PHYSICAL EXAMINATION:      /72   Pulse 74   Resp 18   Ht 5' 10\" (1.778 m)   Wt 179 lb (81.2 kg)   SpO2 97%   BMI 25.68 kg/m²     General:       General appearance:pleasant and well-hydrated, in no distress and A & O x3  Build:Normal Weight  Function:Rises from a seated position with difficulty     HEENT:     Head:normocephalic, atraumatic  Pupils:regular, round, equal  Sclera: icterus absent     Lungs:     Breathing:normal breathing pattern     Abdomen:     Shape:non-distended and normal  Tenderness:none  Guarding:none     Cervical spine:     Inspection:increased muscle tension rt trap  Palpation:tenderness paravertebral muscles, tenderness trapezium, left negative, right positive. Range of motion:abnormal mildly flexion, extension rotation bilateral and is  painful.     Thoracic spine:     Spine inspection:surgical scar   pationPal:non-tender midline and paraspinals, left and right. Range of motion:decreased mildly in flexion, extension rotation bilateral and is not painful.     Lumbar spine:     Spine inspection:surgical scar, SCS generator palpable rt upper buttock which does not flip  CVA tenderness:No   Palpation:tenderness paravertebral muscles, left, right and positive. Range of motion:abnormal moderately in Lateral bending, flexion, extension rotation bilateral and is  painful.     Musculoskeletal:     Trigger points in Paraveteral:absent bilaterally  SI joint tenderness:positive right, positive left              Piriformis tenderness:positive right, positive left  Trochanteric bursa tenderness:negative right, negative left  SLR:negative right, negative left, sitting      Extremities:     Tremors:None bilaterally upper and lower  Range of motion:pain with internal rotation of hips negative.   Intact:Yes  Inspection:LUE AV fistula with post-surgical changes in left upper medial arm with well-healing surgical incision without signs of infection and intact Dermabond

## 2018-09-27 ENCOUNTER — OFFICE VISIT (OUTPATIENT)
Dept: PAIN MANAGEMENT | Age: 63
End: 2018-09-27
Payer: COMMERCIAL

## 2018-09-27 VITALS
WEIGHT: 179 LBS | DIASTOLIC BLOOD PRESSURE: 72 MMHG | RESPIRATION RATE: 18 BRPM | HEIGHT: 70 IN | BODY MASS INDEX: 25.62 KG/M2 | HEART RATE: 74 BPM | OXYGEN SATURATION: 97 % | SYSTOLIC BLOOD PRESSURE: 128 MMHG

## 2018-09-27 DIAGNOSIS — E11.42 DIABETIC PERIPHERAL NEUROPATHY (HCC): ICD-10-CM

## 2018-09-27 DIAGNOSIS — M79.10 MYALGIA: ICD-10-CM

## 2018-09-27 DIAGNOSIS — M54.16 LUMBAR RADICULOPATHY: ICD-10-CM

## 2018-09-27 DIAGNOSIS — G89.4 CHRONIC PAIN SYNDROME: ICD-10-CM

## 2018-09-27 DIAGNOSIS — M96.1 LUMBAR POST-LAMINECTOMY SYNDROME: ICD-10-CM

## 2018-09-27 DIAGNOSIS — M54.2 CERVICALGIA: ICD-10-CM

## 2018-09-27 PROCEDURE — 99213 OFFICE O/P EST LOW 20 MIN: CPT | Performed by: PAIN MEDICINE

## 2018-09-27 RX ORDER — OXYCODONE HYDROCHLORIDE AND ACETAMINOPHEN 5; 325 MG/1; MG/1
1 TABLET ORAL EVERY 6 HOURS PRN
Qty: 28 TABLET | Refills: 0 | Status: SHIPPED | OUTPATIENT
Start: 2018-10-04 | End: 2018-10-12 | Stop reason: SDUPTHER

## 2018-09-27 RX ORDER — GABAPENTIN 100 MG/1
100 CAPSULE ORAL 2 TIMES DAILY
Qty: 60 CAPSULE | Refills: 0 | Status: SHIPPED | OUTPATIENT
Start: 2018-09-27 | End: 2018-09-27 | Stop reason: SDUPTHER

## 2018-09-27 RX ORDER — GABAPENTIN 100 MG/1
200 CAPSULE ORAL EVERY EVENING
Qty: 60 CAPSULE | Refills: 0 | Status: SHIPPED | OUTPATIENT
Start: 2018-09-27 | End: 2018-10-18 | Stop reason: SDUPTHER

## 2018-10-11 RX ORDER — FOLIC ACID 1 MG/1
1 TABLET ORAL
Qty: 30 TABLET | Refills: 5 | Status: SHIPPED | OUTPATIENT
Start: 2018-10-11 | End: 2020-01-31 | Stop reason: SDUPTHER

## 2018-10-11 RX ORDER — ALLOPURINOL 100 MG/1
100 TABLET ORAL DAILY
Qty: 30 TABLET | Refills: 5 | Status: SHIPPED | OUTPATIENT
Start: 2018-10-11 | End: 2019-05-21 | Stop reason: SDUPTHER

## 2018-10-12 ENCOUNTER — TELEPHONE (OUTPATIENT)
Dept: PAIN MANAGEMENT | Age: 63
End: 2018-10-12

## 2018-10-12 DIAGNOSIS — M54.16 LUMBAR RADICULOPATHY: ICD-10-CM

## 2018-10-12 DIAGNOSIS — M51.26 HERNIATED LUMBAR INTERVERTEBRAL DISC: ICD-10-CM

## 2018-10-12 DIAGNOSIS — G89.4 CHRONIC PAIN SYNDROME: Primary | ICD-10-CM

## 2018-10-12 DIAGNOSIS — M51.36 LUMBAR DEGENERATIVE DISC DISEASE: Chronic | ICD-10-CM

## 2018-10-12 DIAGNOSIS — M96.1 LUMBAR POST-LAMINECTOMY SYNDROME: ICD-10-CM

## 2018-10-12 DIAGNOSIS — E11.42 DIABETIC PERIPHERAL NEUROPATHY (HCC): ICD-10-CM

## 2018-10-12 RX ORDER — OXYCODONE HYDROCHLORIDE AND ACETAMINOPHEN 5; 325 MG/1; MG/1
1 TABLET ORAL DAILY
Qty: 7 TABLET | Refills: 0 | Status: SHIPPED | OUTPATIENT
Start: 2018-10-12 | End: 2018-10-18 | Stop reason: SDUPTHER

## 2018-10-12 RX ORDER — OXYCODONE HYDROCHLORIDE AND ACETAMINOPHEN 5; 325 MG/1; MG/1
1 TABLET ORAL EVERY 6 HOURS PRN
Qty: 28 TABLET | Refills: 0 | Status: CANCELLED | OUTPATIENT
Start: 2018-10-12 | End: 2018-10-19

## 2018-10-16 NOTE — PROGRESS NOTES
left  Trochanteric bursa tenderness:negative right, negative left  SLR:negative right, negative left, sitting     Extremities:    Tremors:None bilaterally upper and lower  Range of motion:Generally limited extension shoulder right side, pain with internal rotation of hips negative.   Intact:Yes  Varicose veins:absent   Pulses:radial pulse 2+ left  Cyanosis:none  Edema:Normal    Neurological:    Cranial nerves:normal  Sensory:normal to joint position sense and light touch   Motor:   Right Grip5/5              Left Grip5/5               Right Bicep5/5           Left Bicep5/5              Right Triceps5/5       Left Triceps5/5          Right Deltoid5/5     Left Deltoid5/5                  Right Quadriceps5/5          Left Quadriceps5/5           Right Gastrocnemius5/5    Left Gastrocnemius5/5  Right Ant Tibialis5/5  Left Ant Tibialis5/5  Coordination:normal  Gait:normal    Dermatology:    Skin:no unusual rashes, no skin lesions, no palpable subcutaneous nodules and good skin turgor    Assessment/Plan:    LBP, b/l LE pain, prior PLIF L3-4, L4-5 on 09/25/14  with Dr. Manjinder Manzano, s/p [employer] SCS implant 2015 but does not feel it is working properly at this time  Left hand pain due to complications related to her AV fistula (CKD on dialysis), two days ago had arterial banding surgery and is noticing some improvement in symptoms from the surgery and the addition of gabapentin  Right trapezius/shoulder girdle pain  Hx right breast CA s/p mastectomy, chemo, and radiation, with typical post-radiation skin changes and controlled lymphedema RUE  + DM, controlled (6/2018 TjQ7m=2.2), CHF, HTN     She has been in contact with the [employer] rep and they are scheduled to speak again about possible battery change     OARRS report reviewed 10/2018  Previously discontinued morphine given the risk of build-up of its toxic metabolites in renal failure (she takes in addition to Norco if she has severe pain)  Previously d/c'ed Norco   Continue percocet 5/325 QD prn #30 - ordered for 10/19/2018. May change to #20 as patient does not always take a pill nightly. Will evaluate at next visit. Continue gabapentin 100 mg BID HS - ordered for 10/27/2018. She is doing well at this dose without fatigue and drowsiness. Continue HEP learned in PT  TENS - continue  Patient encouraged to stay active  Treatment plan discussed with the patient including medication side effects     Controlled Substances Monitoring:     RX Monitoring 10/18/2018   Attestation The Prescription Monitoring Report for this patient was reviewed today. Documentation Possible medication side effects, risk of tolerance/dependence & alternative treatments discussed. ;No signs of potential drug abuse or diversion identified.    Medication Contracts Existing medication contract.              ccreferring physic          Electronically signed by BRIAN Prado on 10/16/18 at 10:58 AM

## 2018-10-18 ENCOUNTER — OFFICE VISIT (OUTPATIENT)
Dept: PAIN MANAGEMENT | Age: 63
End: 2018-10-18
Payer: COMMERCIAL

## 2018-10-18 VITALS
BODY MASS INDEX: 25.77 KG/M2 | DIASTOLIC BLOOD PRESSURE: 78 MMHG | SYSTOLIC BLOOD PRESSURE: 130 MMHG | HEART RATE: 87 BPM | OXYGEN SATURATION: 98 % | RESPIRATION RATE: 18 BRPM | HEIGHT: 70 IN | WEIGHT: 180 LBS

## 2018-10-18 DIAGNOSIS — M96.1 LUMBAR POST-LAMINECTOMY SYNDROME: ICD-10-CM

## 2018-10-18 DIAGNOSIS — M54.2 CERVICALGIA: ICD-10-CM

## 2018-10-18 DIAGNOSIS — G89.4 CHRONIC PAIN SYNDROME: ICD-10-CM

## 2018-10-18 DIAGNOSIS — M54.16 LUMBAR RADICULOPATHY: ICD-10-CM

## 2018-10-18 DIAGNOSIS — M79.10 MYALGIA: ICD-10-CM

## 2018-10-18 DIAGNOSIS — M51.36 LUMBAR DEGENERATIVE DISC DISEASE: Chronic | ICD-10-CM

## 2018-10-18 DIAGNOSIS — M51.26 HERNIATED LUMBAR INTERVERTEBRAL DISC: ICD-10-CM

## 2018-10-18 DIAGNOSIS — E11.42 DIABETIC PERIPHERAL NEUROPATHY (HCC): ICD-10-CM

## 2018-10-18 PROCEDURE — G8427 DOCREV CUR MEDS BY ELIG CLIN: HCPCS | Performed by: PHYSICIAN ASSISTANT

## 2018-10-18 PROCEDURE — 99213 OFFICE O/P EST LOW 20 MIN: CPT | Performed by: PHYSICIAN ASSISTANT

## 2018-10-18 PROCEDURE — 3017F COLORECTAL CA SCREEN DOC REV: CPT | Performed by: PHYSICIAN ASSISTANT

## 2018-10-18 PROCEDURE — G8598 ASA/ANTIPLAT THER USED: HCPCS | Performed by: PHYSICIAN ASSISTANT

## 2018-10-18 PROCEDURE — 2022F DILAT RTA XM EVC RTNOPTHY: CPT | Performed by: PHYSICIAN ASSISTANT

## 2018-10-18 PROCEDURE — G8419 CALC BMI OUT NRM PARAM NOF/U: HCPCS | Performed by: PHYSICIAN ASSISTANT

## 2018-10-18 PROCEDURE — 1036F TOBACCO NON-USER: CPT | Performed by: PHYSICIAN ASSISTANT

## 2018-10-18 PROCEDURE — G8484 FLU IMMUNIZE NO ADMIN: HCPCS | Performed by: PHYSICIAN ASSISTANT

## 2018-10-18 PROCEDURE — 3044F HG A1C LEVEL LT 7.0%: CPT | Performed by: PHYSICIAN ASSISTANT

## 2018-10-18 RX ORDER — OXYCODONE HYDROCHLORIDE AND ACETAMINOPHEN 5; 325 MG/1; MG/1
1 TABLET ORAL DAILY
Qty: 30 TABLET | Refills: 0 | Status: SHIPPED | OUTPATIENT
Start: 2018-10-19 | End: 2018-12-06 | Stop reason: SDUPTHER

## 2018-10-18 RX ORDER — GABAPENTIN 100 MG/1
200 CAPSULE ORAL EVERY EVENING
Qty: 60 CAPSULE | Refills: 0 | Status: SHIPPED | OUTPATIENT
Start: 2018-10-27 | End: 2018-12-06 | Stop reason: SDUPTHER

## 2018-11-26 ENCOUNTER — TELEPHONE (OUTPATIENT)
Dept: FAMILY MEDICINE CLINIC | Age: 63
End: 2018-11-26

## 2018-11-26 DIAGNOSIS — I51.7 VENTRICULAR HYPERTROPHY: Chronic | ICD-10-CM

## 2018-11-26 DIAGNOSIS — N18.4 TYPE 2 DIABETES MELLITUS WITH STAGE 4 CHRONIC KIDNEY DISEASE, UNSPECIFIED WHETHER LONG TERM INSULIN USE (HCC): Primary | ICD-10-CM

## 2018-11-26 DIAGNOSIS — E11.22 TYPE 2 DIABETES MELLITUS WITH STAGE 4 CHRONIC KIDNEY DISEASE, UNSPECIFIED WHETHER LONG TERM INSULIN USE (HCC): Primary | ICD-10-CM

## 2018-11-26 DIAGNOSIS — I10 HTN (HYPERTENSION), BENIGN: Chronic | ICD-10-CM

## 2018-11-26 DIAGNOSIS — I25.10 ATHEROSCLEROSIS OF NATIVE CORONARY ARTERY OF NATIVE HEART WITHOUT ANGINA PECTORIS: ICD-10-CM

## 2018-11-26 RX ORDER — PRAVASTATIN SODIUM 20 MG
20 TABLET ORAL NIGHTLY
Qty: 30 TABLET | Refills: 5 | OUTPATIENT
Start: 2018-11-26

## 2018-11-26 RX ORDER — ATORVASTATIN CALCIUM 40 MG/1
40 TABLET, FILM COATED ORAL DAILY
Qty: 30 TABLET | Refills: 3 | Status: SHIPPED | OUTPATIENT
Start: 2018-11-26 | End: 2019-04-29 | Stop reason: SDUPTHER

## 2018-11-26 NOTE — TELEPHONE ENCOUNTER
Please advise patient I am switching her cholesterol medication to a different one in the same family. With past medical history patient requires high dose statin. We also need to check her cholesterol levels and hepatic function. She needs to be fasting 8 hours for blood work. Orders are in.      Thank you,  Dr Vel Degroot

## 2018-12-04 ENCOUNTER — OFFICE VISIT (OUTPATIENT)
Dept: FAMILY MEDICINE CLINIC | Age: 63
End: 2018-12-04
Payer: COMMERCIAL

## 2018-12-04 VITALS
HEART RATE: 85 BPM | HEIGHT: 70 IN | WEIGHT: 193 LBS | DIASTOLIC BLOOD PRESSURE: 57 MMHG | RESPIRATION RATE: 20 BRPM | SYSTOLIC BLOOD PRESSURE: 101 MMHG | BODY MASS INDEX: 27.63 KG/M2

## 2018-12-04 DIAGNOSIS — N18.4 TYPE 2 DIABETES MELLITUS WITH STAGE 4 CHRONIC KIDNEY DISEASE, UNSPECIFIED WHETHER LONG TERM INSULIN USE (HCC): Primary | ICD-10-CM

## 2018-12-04 DIAGNOSIS — E11.22 TYPE 2 DIABETES MELLITUS WITH STAGE 4 CHRONIC KIDNEY DISEASE, UNSPECIFIED WHETHER LONG TERM INSULIN USE (HCC): Primary | ICD-10-CM

## 2018-12-04 DIAGNOSIS — Z23 NEED FOR VACCINATION FOR STREP PNEUMONIAE: ICD-10-CM

## 2018-12-04 DIAGNOSIS — Z91.81 HX OF FALL: ICD-10-CM

## 2018-12-04 LAB — HBA1C MFR BLD: 6.8 %

## 2018-12-04 PROCEDURE — G8484 FLU IMMUNIZE NO ADMIN: HCPCS | Performed by: FAMILY MEDICINE

## 2018-12-04 PROCEDURE — 3044F HG A1C LEVEL LT 7.0%: CPT | Performed by: FAMILY MEDICINE

## 2018-12-04 PROCEDURE — G8419 CALC BMI OUT NRM PARAM NOF/U: HCPCS | Performed by: FAMILY MEDICINE

## 2018-12-04 PROCEDURE — 3017F COLORECTAL CA SCREEN DOC REV: CPT | Performed by: FAMILY MEDICINE

## 2018-12-04 PROCEDURE — G0009 ADMIN PNEUMOCOCCAL VACCINE: HCPCS | Performed by: FAMILY MEDICINE

## 2018-12-04 PROCEDURE — 1036F TOBACCO NON-USER: CPT | Performed by: FAMILY MEDICINE

## 2018-12-04 PROCEDURE — G8427 DOCREV CUR MEDS BY ELIG CLIN: HCPCS | Performed by: FAMILY MEDICINE

## 2018-12-04 PROCEDURE — 90732 PPSV23 VACC 2 YRS+ SUBQ/IM: CPT | Performed by: FAMILY MEDICINE

## 2018-12-04 PROCEDURE — 99213 OFFICE O/P EST LOW 20 MIN: CPT | Performed by: FAMILY MEDICINE

## 2018-12-04 PROCEDURE — G8598 ASA/ANTIPLAT THER USED: HCPCS | Performed by: FAMILY MEDICINE

## 2018-12-04 PROCEDURE — 83036 HEMOGLOBIN GLYCOSYLATED A1C: CPT | Performed by: FAMILY MEDICINE

## 2018-12-04 PROCEDURE — 2022F DILAT RTA XM EVC RTNOPTHY: CPT | Performed by: FAMILY MEDICINE

## 2018-12-04 RX ORDER — OXYCODONE HCL 10 MG/1
10 TABLET, FILM COATED, EXTENDED RELEASE ORAL EVERY 12 HOURS
COMMUNITY
End: 2019-01-31

## 2018-12-04 RX ORDER — PEN NEEDLE, DIABETIC 31 G X1/4"
1 NEEDLE, DISPOSABLE MISCELLANEOUS DAILY
Qty: 100 EACH | Refills: 5 | Status: SHIPPED
Start: 2018-12-04 | End: 2020-08-06 | Stop reason: SDUPTHER

## 2018-12-05 NOTE — PROGRESS NOTES
bilaterally  Trigger points in rhomboids:absent bilaterally  Trigger points in Paraveteral:absent bilaterally  Trigger points in supraspinatus/infraspinatus:absent  Spurling's:negative right, negative left  SI joint tenderness:negative right, negative left              MARSHA test:negative right, negative left  Piriformis tenderness:negative right, negative left  Trochanteric bursa tenderness:negative right, negative left  SLR:negative right, negative left, sitting     Extremities:    Tremors:None bilaterally upper and lower  Range of motion:Generally limited extension shoulder right side, pain with internal rotation of hips negative.   Intact:Yes  Varicose veins:absent   Pulses:radial pulse 2+ left  Cyanosis:none  Edema:Normal    Neurological:    Cranial nerves:normal  Sensory:normal to joint position sense and light touch   Motor:   Right Grip5/5              Left Grip5/5               Right Bicep5/5           Left Bicep5/5              Right Triceps5/5       Left Triceps5/5          Right Deltoid5/5     Left Deltoid5/5                  Right Quadriceps5/5          Left Quadriceps5/5           Right Gastrocnemius5/5    Left Gastrocnemius5/5  Right Ant Tibialis5/5  Left Ant Tibialis5/5  Coordination:normal  Gait:normal    Dermatology:    Skin:no unusual rashes, no skin lesions, no palpable subcutaneous nodules and good skin turgor    Assessment/Plan:    LBP, b/l LE pain, prior PLIF L3-4, L4-5 on 09/25/14  with Dr. Tan Mark, s/p [employer] SCS implant 2015 but does not feel it is working properly at this time  Left hand pain due to complications related to her AV fistula (CKD on dialysis),  had arterial banding surgery and is noticing some improvement in symptoms from the surgery and the addition of gabapentin  Right trapezius/shoulder girdle pain  Hx right breast CA s/p mastectomy, chemo, and radiation, with typical post-radiation skin changes and controlled lymphedema RUE  + DM, controlled (6/2018 YgI2z=7.2), CHF, HTN     She has been in contact with the [employer] rep and they are they have spoken about a battery change. She has been having issues with her fistula so will speak with her at the beginning of the year.     OARRS report reviewed 12/2018  Previously discontinued morphine given the risk of build-up of its toxic metabolites in renal failure (she takes in addition to [address] if she has severe pain)  Previously d/c'ed Norco   Continue percocet 5/325 QD prn #30 - ordered for today. Continue gabapentin 100 mg BID HS - ordered for today. She is doing well at this dose without fatigue and drowsiness. Continue HEP learned in PT  TENS - states that it is not working that well. Okay to discontinue and use as she wishes. UDS next visit  Patient encouraged to stay active  Treatment plan discussed with the patient including medication side effects     Controlled Substances Monitoring:     RX Monitoring 12/6/2018   Attestation The Prescription Monitoring Report for this patient was reviewed today. Documentation Possible medication side effects, risk of tolerance/dependence & alternative treatments discussed. ;No signs of potential drug abuse or diversion identified.    Medication Contracts Existing medication contract.                  ccreferring physic

## 2018-12-06 ENCOUNTER — OFFICE VISIT (OUTPATIENT)
Dept: PAIN MANAGEMENT | Age: 63
End: 2018-12-06
Payer: COMMERCIAL

## 2018-12-06 VITALS
HEIGHT: 70 IN | WEIGHT: 182 LBS | BODY MASS INDEX: 26.05 KG/M2 | DIASTOLIC BLOOD PRESSURE: 78 MMHG | RESPIRATION RATE: 18 BRPM | HEART RATE: 87 BPM | SYSTOLIC BLOOD PRESSURE: 132 MMHG | TEMPERATURE: 98.4 F

## 2018-12-06 DIAGNOSIS — M96.1 LUMBAR POST-LAMINECTOMY SYNDROME: ICD-10-CM

## 2018-12-06 DIAGNOSIS — E11.42 DIABETIC PERIPHERAL NEUROPATHY (HCC): ICD-10-CM

## 2018-12-06 DIAGNOSIS — M79.10 MYALGIA: ICD-10-CM

## 2018-12-06 DIAGNOSIS — M54.16 LUMBAR RADICULOPATHY: ICD-10-CM

## 2018-12-06 DIAGNOSIS — M51.26 HERNIATED LUMBAR INTERVERTEBRAL DISC: ICD-10-CM

## 2018-12-06 DIAGNOSIS — G89.4 CHRONIC PAIN SYNDROME: ICD-10-CM

## 2018-12-06 DIAGNOSIS — M51.36 LUMBAR DEGENERATIVE DISC DISEASE: Chronic | ICD-10-CM

## 2018-12-06 DIAGNOSIS — M54.2 CERVICALGIA: ICD-10-CM

## 2018-12-06 PROCEDURE — 3044F HG A1C LEVEL LT 7.0%: CPT | Performed by: PHYSICIAN ASSISTANT

## 2018-12-06 PROCEDURE — G8598 ASA/ANTIPLAT THER USED: HCPCS | Performed by: PHYSICIAN ASSISTANT

## 2018-12-06 PROCEDURE — 99213 OFFICE O/P EST LOW 20 MIN: CPT | Performed by: PHYSICIAN ASSISTANT

## 2018-12-06 PROCEDURE — G8484 FLU IMMUNIZE NO ADMIN: HCPCS | Performed by: PHYSICIAN ASSISTANT

## 2018-12-06 PROCEDURE — 2022F DILAT RTA XM EVC RTNOPTHY: CPT | Performed by: PHYSICIAN ASSISTANT

## 2018-12-06 PROCEDURE — 3017F COLORECTAL CA SCREEN DOC REV: CPT | Performed by: PHYSICIAN ASSISTANT

## 2018-12-06 PROCEDURE — G8427 DOCREV CUR MEDS BY ELIG CLIN: HCPCS | Performed by: PHYSICIAN ASSISTANT

## 2018-12-06 PROCEDURE — G8419 CALC BMI OUT NRM PARAM NOF/U: HCPCS | Performed by: PHYSICIAN ASSISTANT

## 2018-12-06 PROCEDURE — 1036F TOBACCO NON-USER: CPT | Performed by: PHYSICIAN ASSISTANT

## 2018-12-06 RX ORDER — GABAPENTIN 100 MG/1
200 CAPSULE ORAL EVERY EVENING
Qty: 60 CAPSULE | Refills: 0 | Status: SHIPPED | OUTPATIENT
Start: 2018-12-06 | End: 2019-01-03 | Stop reason: SDUPTHER

## 2018-12-06 RX ORDER — OXYCODONE HYDROCHLORIDE AND ACETAMINOPHEN 5; 325 MG/1; MG/1
1 TABLET ORAL DAILY
Qty: 30 TABLET | Refills: 0 | Status: SHIPPED | OUTPATIENT
Start: 2018-12-06 | End: 2019-01-03 | Stop reason: SDUPTHER

## 2019-01-03 ENCOUNTER — OFFICE VISIT (OUTPATIENT)
Dept: PAIN MANAGEMENT | Age: 64
End: 2019-01-03
Payer: COMMERCIAL

## 2019-01-03 VITALS
OXYGEN SATURATION: 92 % | WEIGHT: 183 LBS | RESPIRATION RATE: 18 BRPM | DIASTOLIC BLOOD PRESSURE: 78 MMHG | HEART RATE: 76 BPM | HEIGHT: 70 IN | BODY MASS INDEX: 26.2 KG/M2 | SYSTOLIC BLOOD PRESSURE: 128 MMHG

## 2019-01-03 DIAGNOSIS — M51.36 LUMBAR DEGENERATIVE DISC DISEASE: Chronic | ICD-10-CM

## 2019-01-03 DIAGNOSIS — M54.16 LUMBAR RADICULOPATHY: ICD-10-CM

## 2019-01-03 DIAGNOSIS — M51.26 HERNIATED LUMBAR INTERVERTEBRAL DISC: ICD-10-CM

## 2019-01-03 DIAGNOSIS — M54.2 CERVICALGIA: ICD-10-CM

## 2019-01-03 DIAGNOSIS — G89.4 CHRONIC PAIN SYNDROME: ICD-10-CM

## 2019-01-03 DIAGNOSIS — E11.42 DIABETIC PERIPHERAL NEUROPATHY (HCC): ICD-10-CM

## 2019-01-03 DIAGNOSIS — M79.10 MYALGIA: ICD-10-CM

## 2019-01-03 DIAGNOSIS — M96.1 LUMBAR POST-LAMINECTOMY SYNDROME: ICD-10-CM

## 2019-01-03 PROCEDURE — 3014F SCREEN MAMMO DOC REV: CPT | Performed by: PHYSICIAN ASSISTANT

## 2019-01-03 PROCEDURE — 3046F HEMOGLOBIN A1C LEVEL >9.0%: CPT | Performed by: PHYSICIAN ASSISTANT

## 2019-01-03 PROCEDURE — G8484 FLU IMMUNIZE NO ADMIN: HCPCS | Performed by: PHYSICIAN ASSISTANT

## 2019-01-03 PROCEDURE — 99213 OFFICE O/P EST LOW 20 MIN: CPT | Performed by: PHYSICIAN ASSISTANT

## 2019-01-03 PROCEDURE — 3017F COLORECTAL CA SCREEN DOC REV: CPT | Performed by: PHYSICIAN ASSISTANT

## 2019-01-03 PROCEDURE — G8427 DOCREV CUR MEDS BY ELIG CLIN: HCPCS | Performed by: PHYSICIAN ASSISTANT

## 2019-01-03 PROCEDURE — 1036F TOBACCO NON-USER: CPT | Performed by: PHYSICIAN ASSISTANT

## 2019-01-03 PROCEDURE — G8419 CALC BMI OUT NRM PARAM NOF/U: HCPCS | Performed by: PHYSICIAN ASSISTANT

## 2019-01-03 PROCEDURE — 2022F DILAT RTA XM EVC RTNOPTHY: CPT | Performed by: PHYSICIAN ASSISTANT

## 2019-01-03 PROCEDURE — G8598 ASA/ANTIPLAT THER USED: HCPCS | Performed by: PHYSICIAN ASSISTANT

## 2019-01-03 RX ORDER — GABAPENTIN 100 MG/1
200 CAPSULE ORAL EVERY EVENING
Qty: 60 CAPSULE | Refills: 0 | Status: SHIPPED | OUTPATIENT
Start: 2019-01-05 | End: 2019-01-31 | Stop reason: SDUPTHER

## 2019-01-03 RX ORDER — OXYCODONE HYDROCHLORIDE AND ACETAMINOPHEN 5; 325 MG/1; MG/1
1 TABLET ORAL DAILY
Qty: 30 TABLET | Refills: 0 | Status: SHIPPED | OUTPATIENT
Start: 2019-01-05 | End: 2019-01-31 | Stop reason: SDUPTHER

## 2019-01-31 ENCOUNTER — OFFICE VISIT (OUTPATIENT)
Dept: PAIN MANAGEMENT | Age: 64
End: 2019-01-31
Payer: COMMERCIAL

## 2019-01-31 VITALS
HEART RATE: 74 BPM | RESPIRATION RATE: 18 BRPM | SYSTOLIC BLOOD PRESSURE: 104 MMHG | WEIGHT: 183 LBS | BODY MASS INDEX: 26.2 KG/M2 | OXYGEN SATURATION: 99 % | HEIGHT: 70 IN | DIASTOLIC BLOOD PRESSURE: 62 MMHG | TEMPERATURE: 98.8 F

## 2019-01-31 DIAGNOSIS — M79.10 MYALGIA: ICD-10-CM

## 2019-01-31 DIAGNOSIS — M96.1 LUMBAR POST-LAMINECTOMY SYNDROME: ICD-10-CM

## 2019-01-31 DIAGNOSIS — M54.2 CERVICALGIA: ICD-10-CM

## 2019-01-31 DIAGNOSIS — G89.4 CHRONIC PAIN SYNDROME: ICD-10-CM

## 2019-01-31 DIAGNOSIS — M51.26 HERNIATED LUMBAR INTERVERTEBRAL DISC: ICD-10-CM

## 2019-01-31 DIAGNOSIS — M51.36 LUMBAR DEGENERATIVE DISC DISEASE: Chronic | ICD-10-CM

## 2019-01-31 DIAGNOSIS — E11.42 DIABETIC PERIPHERAL NEUROPATHY (HCC): ICD-10-CM

## 2019-01-31 DIAGNOSIS — M54.16 LUMBAR RADICULOPATHY: ICD-10-CM

## 2019-01-31 PROCEDURE — G8427 DOCREV CUR MEDS BY ELIG CLIN: HCPCS | Performed by: PHYSICIAN ASSISTANT

## 2019-01-31 PROCEDURE — 1036F TOBACCO NON-USER: CPT | Performed by: PHYSICIAN ASSISTANT

## 2019-01-31 PROCEDURE — 3017F COLORECTAL CA SCREEN DOC REV: CPT | Performed by: PHYSICIAN ASSISTANT

## 2019-01-31 PROCEDURE — 99213 OFFICE O/P EST LOW 20 MIN: CPT | Performed by: PHYSICIAN ASSISTANT

## 2019-01-31 PROCEDURE — G8598 ASA/ANTIPLAT THER USED: HCPCS | Performed by: PHYSICIAN ASSISTANT

## 2019-01-31 PROCEDURE — G8484 FLU IMMUNIZE NO ADMIN: HCPCS | Performed by: PHYSICIAN ASSISTANT

## 2019-01-31 PROCEDURE — 3014F SCREEN MAMMO DOC REV: CPT | Performed by: PHYSICIAN ASSISTANT

## 2019-01-31 PROCEDURE — G8419 CALC BMI OUT NRM PARAM NOF/U: HCPCS | Performed by: PHYSICIAN ASSISTANT

## 2019-01-31 PROCEDURE — 2022F DILAT RTA XM EVC RTNOPTHY: CPT | Performed by: PHYSICIAN ASSISTANT

## 2019-01-31 PROCEDURE — 3046F HEMOGLOBIN A1C LEVEL >9.0%: CPT | Performed by: PHYSICIAN ASSISTANT

## 2019-01-31 RX ORDER — OXYCODONE HYDROCHLORIDE AND ACETAMINOPHEN 5; 325 MG/1; MG/1
1 TABLET ORAL DAILY
Qty: 30 TABLET | Refills: 0 | Status: SHIPPED | OUTPATIENT
Start: 2019-01-31 | End: 2019-02-28 | Stop reason: SDUPTHER

## 2019-01-31 RX ORDER — GABAPENTIN 100 MG/1
200 CAPSULE ORAL EVERY EVENING
Qty: 60 CAPSULE | Refills: 0 | Status: SHIPPED | OUTPATIENT
Start: 2019-02-01 | End: 2019-02-28 | Stop reason: SDUPTHER

## 2019-02-25 RX ORDER — ISOSORBIDE MONONITRATE 60 MG/1
TABLET, EXTENDED RELEASE ORAL
Qty: 60 TABLET | Refills: 4 | Status: SHIPPED | OUTPATIENT
Start: 2019-02-25 | End: 2019-08-06 | Stop reason: SDUPTHER

## 2019-02-28 ENCOUNTER — OFFICE VISIT (OUTPATIENT)
Dept: PAIN MANAGEMENT | Age: 64
End: 2019-02-28
Payer: COMMERCIAL

## 2019-02-28 VITALS
TEMPERATURE: 98.6 F | DIASTOLIC BLOOD PRESSURE: 72 MMHG | BODY MASS INDEX: 26.05 KG/M2 | OXYGEN SATURATION: 92 % | RESPIRATION RATE: 18 BRPM | SYSTOLIC BLOOD PRESSURE: 132 MMHG | HEART RATE: 74 BPM | WEIGHT: 182 LBS | HEIGHT: 70 IN

## 2019-02-28 DIAGNOSIS — G89.4 CHRONIC PAIN SYNDROME: ICD-10-CM

## 2019-02-28 DIAGNOSIS — M51.26 HERNIATED LUMBAR INTERVERTEBRAL DISC: ICD-10-CM

## 2019-02-28 DIAGNOSIS — M51.36 LUMBAR DEGENERATIVE DISC DISEASE: Chronic | ICD-10-CM

## 2019-02-28 DIAGNOSIS — M54.16 LUMBAR RADICULOPATHY: ICD-10-CM

## 2019-02-28 DIAGNOSIS — M54.2 CERVICALGIA: ICD-10-CM

## 2019-02-28 DIAGNOSIS — M79.10 MYALGIA: ICD-10-CM

## 2019-02-28 DIAGNOSIS — M96.1 LUMBAR POST-LAMINECTOMY SYNDROME: ICD-10-CM

## 2019-02-28 DIAGNOSIS — E11.42 DIABETIC PERIPHERAL NEUROPATHY (HCC): ICD-10-CM

## 2019-02-28 PROCEDURE — 2022F DILAT RTA XM EVC RTNOPTHY: CPT | Performed by: PHYSICIAN ASSISTANT

## 2019-02-28 PROCEDURE — G8484 FLU IMMUNIZE NO ADMIN: HCPCS | Performed by: PHYSICIAN ASSISTANT

## 2019-02-28 PROCEDURE — G8419 CALC BMI OUT NRM PARAM NOF/U: HCPCS | Performed by: PHYSICIAN ASSISTANT

## 2019-02-28 PROCEDURE — G8427 DOCREV CUR MEDS BY ELIG CLIN: HCPCS | Performed by: PHYSICIAN ASSISTANT

## 2019-02-28 PROCEDURE — 3017F COLORECTAL CA SCREEN DOC REV: CPT | Performed by: PHYSICIAN ASSISTANT

## 2019-02-28 PROCEDURE — 99213 OFFICE O/P EST LOW 20 MIN: CPT | Performed by: PHYSICIAN ASSISTANT

## 2019-02-28 PROCEDURE — 3014F SCREEN MAMMO DOC REV: CPT | Performed by: PHYSICIAN ASSISTANT

## 2019-02-28 PROCEDURE — G8598 ASA/ANTIPLAT THER USED: HCPCS | Performed by: PHYSICIAN ASSISTANT

## 2019-02-28 PROCEDURE — 3046F HEMOGLOBIN A1C LEVEL >9.0%: CPT | Performed by: PHYSICIAN ASSISTANT

## 2019-02-28 PROCEDURE — 1036F TOBACCO NON-USER: CPT | Performed by: PHYSICIAN ASSISTANT

## 2019-02-28 RX ORDER — GABAPENTIN 100 MG/1
200 CAPSULE ORAL EVERY EVENING
Qty: 60 CAPSULE | Refills: 0 | Status: SHIPPED | OUTPATIENT
Start: 2019-03-02 | End: 2019-03-28 | Stop reason: SDUPTHER

## 2019-02-28 RX ORDER — OXYCODONE HYDROCHLORIDE AND ACETAMINOPHEN 5; 325 MG/1; MG/1
1 TABLET ORAL DAILY
Qty: 30 TABLET | Refills: 0 | Status: SHIPPED | OUTPATIENT
Start: 2019-03-02 | End: 2019-03-28 | Stop reason: SDUPTHER

## 2019-03-28 ENCOUNTER — OFFICE VISIT (OUTPATIENT)
Dept: FAMILY MEDICINE CLINIC | Age: 64
End: 2019-03-28
Payer: COMMERCIAL

## 2019-03-28 ENCOUNTER — OFFICE VISIT (OUTPATIENT)
Dept: PAIN MANAGEMENT | Age: 64
End: 2019-03-28
Payer: COMMERCIAL

## 2019-03-28 VITALS
RESPIRATION RATE: 18 BRPM | HEIGHT: 70 IN | OXYGEN SATURATION: 97 % | DIASTOLIC BLOOD PRESSURE: 50 MMHG | WEIGHT: 198 LBS | BODY MASS INDEX: 28.35 KG/M2 | SYSTOLIC BLOOD PRESSURE: 110 MMHG | HEART RATE: 76 BPM

## 2019-03-28 VITALS
HEART RATE: 67 BPM | DIASTOLIC BLOOD PRESSURE: 78 MMHG | SYSTOLIC BLOOD PRESSURE: 132 MMHG | RESPIRATION RATE: 18 BRPM | BODY MASS INDEX: 26.2 KG/M2 | TEMPERATURE: 98.4 F | HEIGHT: 70 IN | WEIGHT: 183 LBS | OXYGEN SATURATION: 98 %

## 2019-03-28 DIAGNOSIS — M51.26 HERNIATED LUMBAR INTERVERTEBRAL DISC: ICD-10-CM

## 2019-03-28 DIAGNOSIS — H91.93 HEARING PROBLEM OF BOTH EARS: Primary | ICD-10-CM

## 2019-03-28 DIAGNOSIS — M54.16 LUMBAR RADICULOPATHY: ICD-10-CM

## 2019-03-28 DIAGNOSIS — E11.42 DIABETIC PERIPHERAL NEUROPATHY (HCC): ICD-10-CM

## 2019-03-28 DIAGNOSIS — G89.4 CHRONIC PAIN SYNDROME: ICD-10-CM

## 2019-03-28 DIAGNOSIS — M51.36 LUMBAR DEGENERATIVE DISC DISEASE: Chronic | ICD-10-CM

## 2019-03-28 DIAGNOSIS — M79.10 MYALGIA: ICD-10-CM

## 2019-03-28 DIAGNOSIS — M96.1 LUMBAR POST-LAMINECTOMY SYNDROME: ICD-10-CM

## 2019-03-28 DIAGNOSIS — M54.2 CERVICALGIA: ICD-10-CM

## 2019-03-28 PROCEDURE — G8427 DOCREV CUR MEDS BY ELIG CLIN: HCPCS | Performed by: PHYSICIAN ASSISTANT

## 2019-03-28 PROCEDURE — G8598 ASA/ANTIPLAT THER USED: HCPCS | Performed by: PHYSICIAN ASSISTANT

## 2019-03-28 PROCEDURE — G8427 DOCREV CUR MEDS BY ELIG CLIN: HCPCS | Performed by: STUDENT IN AN ORGANIZED HEALTH CARE EDUCATION/TRAINING PROGRAM

## 2019-03-28 PROCEDURE — G8419 CALC BMI OUT NRM PARAM NOF/U: HCPCS | Performed by: PHYSICIAN ASSISTANT

## 2019-03-28 PROCEDURE — G8484 FLU IMMUNIZE NO ADMIN: HCPCS | Performed by: PHYSICIAN ASSISTANT

## 2019-03-28 PROCEDURE — 3014F SCREEN MAMMO DOC REV: CPT | Performed by: STUDENT IN AN ORGANIZED HEALTH CARE EDUCATION/TRAINING PROGRAM

## 2019-03-28 PROCEDURE — G8484 FLU IMMUNIZE NO ADMIN: HCPCS | Performed by: STUDENT IN AN ORGANIZED HEALTH CARE EDUCATION/TRAINING PROGRAM

## 2019-03-28 PROCEDURE — 3014F SCREEN MAMMO DOC REV: CPT | Performed by: PHYSICIAN ASSISTANT

## 2019-03-28 PROCEDURE — 99213 OFFICE O/P EST LOW 20 MIN: CPT | Performed by: PHYSICIAN ASSISTANT

## 2019-03-28 PROCEDURE — G8598 ASA/ANTIPLAT THER USED: HCPCS | Performed by: STUDENT IN AN ORGANIZED HEALTH CARE EDUCATION/TRAINING PROGRAM

## 2019-03-28 PROCEDURE — 99213 OFFICE O/P EST LOW 20 MIN: CPT | Performed by: STUDENT IN AN ORGANIZED HEALTH CARE EDUCATION/TRAINING PROGRAM

## 2019-03-28 PROCEDURE — 1036F TOBACCO NON-USER: CPT | Performed by: STUDENT IN AN ORGANIZED HEALTH CARE EDUCATION/TRAINING PROGRAM

## 2019-03-28 PROCEDURE — G8419 CALC BMI OUT NRM PARAM NOF/U: HCPCS | Performed by: STUDENT IN AN ORGANIZED HEALTH CARE EDUCATION/TRAINING PROGRAM

## 2019-03-28 PROCEDURE — 3017F COLORECTAL CA SCREEN DOC REV: CPT | Performed by: STUDENT IN AN ORGANIZED HEALTH CARE EDUCATION/TRAINING PROGRAM

## 2019-03-28 PROCEDURE — 2022F DILAT RTA XM EVC RTNOPTHY: CPT | Performed by: PHYSICIAN ASSISTANT

## 2019-03-28 PROCEDURE — 3017F COLORECTAL CA SCREEN DOC REV: CPT | Performed by: PHYSICIAN ASSISTANT

## 2019-03-28 PROCEDURE — 3046F HEMOGLOBIN A1C LEVEL >9.0%: CPT | Performed by: PHYSICIAN ASSISTANT

## 2019-03-28 PROCEDURE — 1036F TOBACCO NON-USER: CPT | Performed by: PHYSICIAN ASSISTANT

## 2019-03-28 RX ORDER — OXYCODONE HYDROCHLORIDE AND ACETAMINOPHEN 5; 325 MG/1; MG/1
1 TABLET ORAL DAILY
Qty: 30 TABLET | Refills: 0 | Status: SHIPPED | OUTPATIENT
Start: 2019-04-04 | End: 2019-03-28 | Stop reason: SDUPTHER

## 2019-03-28 RX ORDER — OXYCODONE HYDROCHLORIDE AND ACETAMINOPHEN 5; 325 MG/1; MG/1
1 TABLET ORAL DAILY
Qty: 30 TABLET | Refills: 0 | Status: SHIPPED | OUTPATIENT
Start: 2019-05-04 | End: 2019-06-06 | Stop reason: SDUPTHER

## 2019-03-28 RX ORDER — GABAPENTIN 100 MG/1
200 CAPSULE ORAL EVERY EVENING
Qty: 60 CAPSULE | Refills: 1 | Status: SHIPPED | OUTPATIENT
Start: 2019-03-30 | End: 2019-06-06 | Stop reason: SDUPTHER

## 2019-03-28 ASSESSMENT — PATIENT HEALTH QUESTIONNAIRE - PHQ9
2. FEELING DOWN, DEPRESSED OR HOPELESS: 0
SUM OF ALL RESPONSES TO PHQ QUESTIONS 1-9: 0
SUM OF ALL RESPONSES TO PHQ9 QUESTIONS 1 & 2: 0
SUM OF ALL RESPONSES TO PHQ QUESTIONS 1-9: 0
1. LITTLE INTEREST OR PLEASURE IN DOING THINGS: 0

## 2019-03-28 NOTE — PROGRESS NOTES
4/1/2019    Gissell Perez is a 62 y.o. female here for   Chief Complaint   Patient presents with    Hearing Problem     can't seem to hear when people up; close, comes and goes; on Monday everything sounded distorted   Patient here for hearing loss. It is episodic. Started back in august. She was at an event and could hear  the speaker, but not people around here. It lasted 1.5 hr ago. Happened again a month later. Then last Monday 3/25/19. Lasted 3 hours then came back. NO ear pain. No fever or chills. No recent illness. Had  URI infection. Does not take advil. On dialysis. Takes tylenol. Also takes percocet. Had Breast Ca, very aggressive. Saw Dr. Raúl Joaquin. In remission. Lately going to dialysis. Had Arthiritis. Steroid shot in the arm? On bumex and clonidine. Right now her hearing is fine, patient answering appropriately. Allergies   Allergen Reactions    Lasix [Furosemide] Other (See Comments)     States her kidneys shut down       Medications  Current Outpatient Medications   Medication Sig Dispense Refill    gabapentin (NEURONTIN) 100 MG capsule Take 2 capsules by mouth every evening for 30 days. 60 capsule 1    [START ON 5/4/2019] oxyCODONE-acetaminophen (PERCOCET) 5-325 MG per tablet Take 1 tablet by mouth daily for 30 days. Do not fill until 5/4/2019 30 tablet 0    isosorbide mononitrate (IMDUR) 60 MG extended release tablet TAKE ONE TABLET BY MOUTH TWO TIMES A DAY 60 tablet 4    NONFORMULARY       blood glucose test strips (ACCU-CHEK ACTIVE STRIPS) strip 1 each by In Vitro route 2 times daily As needed.  100 each 5    Insulin Pen Needle (PEN NEEDLES) 31G X 6 MM MISC 1 each by Does not apply route daily 100 each 5    atorvastatin (LIPITOR) 40 MG tablet Take 1 tablet by mouth daily 30 tablet 3    allopurinol (ZYLOPRIM) 100 MG tablet Take 1 tablet by mouth daily 30 tablet 5    folic acid (FOLVITE) 1 MG tablet Take 1 tablet by mouth 5 times daily 30 tablet 5    bumetanide (BUMEX) 2 MG tablet Take 1 tablet by mouth three times a week 90 tablet 5    omeprazole (PRILOSEC) 20 MG delayed release capsule TAKE ONE CAPSULE BY MOUTH EVERY DAY AT BEDTIME  5    metoclopramide (REGLAN) 10 MG tablet take 1/2 to 1 tablet by mouth before meals and at bedtime for nausea prevention  2    insulin glargine (LANTUS SOLOSTAR) 100 UNIT/ML injection pen Inject 10 Units into the skin nightly 5 pen 3    sevelamer (RENVELA) 800 MG tablet Take 1 tablet by mouth 3 times daily (with meals)      Insulin Aspart (NOVOLOG SC) Inject into the skin Sliding scale, will bring sliding scale in DOS.  cloNIDine (CATAPRES) 0.2 MG tablet Take 1 tablet by mouth 2 times daily 60 tablet 3    amLODIPine (NORVASC) 10 MG tablet Take 1 tablet by mouth daily 30 tablet 3    sennosides-docusate sodium (SENOKOT-S) 8.6-50 MG tablet Take 2 tablets by mouth daily as needed for Constipation      ondansetron (ZOFRAN ODT) 4 MG disintegrating tablet Take 4 mg by mouth every 8 hours as needed for Nausea or Vomiting      aspirin EC 81 MG EC tablet Take 81 mg by mouth daily Last dose 9-19      Brimonidine Tartrate-Timolol (COMBIGAN OP) Apply 1 drop to eye 2 times daily OD      cyanocobalamin 1000 MCG tablet Take 1 tablet by mouth daily. 30 tablet 3    darbepoetin fani-polysorbate (ARANESP) 40 MCG/0.4ML SOLN injection Inject 0.4 mLs into the skin once a week. (Patient taking differently: Inject 40 mcg into the skin every 14 days ) 8.4 mL 0    Bimatoprost (LUMIGAN OP) Place 1 drop into both eyes daily Indications: every evening As directed       No current facility-administered medications for this visit. PastMedical/Surgical Hx;  Reviewed with patient      Diagnosis Date    Acute infection of bone ([facility]     infection of rt foot, resolved.     Anemia of chronic disease     Breast cancer ([facility]     right breast, 2008/ bladder, 2006    CAD (coronary artery disease)     Chronic diastolic CHF (congestive heart failure) ([facility] 09/23/2014 9/23/14- echocardiogram revealed moderate LV concentric hypertrophy, stage III diastolic dysfunction, mild tricuspid regurgitation    CKD (chronic kidney disease) stage 4, GFR 15-29 ml/min ([facility])     Diabetic retinopathy ([address]     Glaucoma     Hemodialysis patient ([address]     [facility]  mon wed fri    Hyperkalemia, diminished renal excretion 11/9/2017    Hypertension     Hypoglycemia unawareness in type 1 diabetes mellitus ([address] 11/7/2017    Insulin dependent type 2 diabetes mellitus ([address]     Neuropathy     feet    Osteomyelitis due to secondary diabetes ([address]     rt great toe with amputation    Patient is [employer] 11/7/2017    Refusal of blood product     patient states she dose not take blood transfusion    Ventricular hypertrophy     Vitreous hemorrhage ([address]     left eye     Past Surgical History:   Procedure Laterality Date    AMPUTATION      right great toe    ANKLE SURGERY      correction on charcot joint of right ankle    CATARACT REMOVAL      bilateral    CHOLECYSTECTOMY      CYSTOSCOPY      DIALYSIS FISTULA CREATION Left 01/31/2018    upper arm/Dr. Shelle Simmonds    ECHO COMPL W DOP COLOR FLOW  2/14/2013         ECHO COMPLETE  9/17/2013         MASTECTOMY      right    OTHER SURGICAL HISTORY  9/27/2011    PPV, membranectomy, laser Right eye    OTHER SURGICAL HISTORY  insertion lumbar drain insertion    10/12/`14    OTHER SURGICAL HISTORY  10/22/15    percutaneous lead placement for spinal cord stimulator    OTHER SURGICAL HISTORY  11/3/15    Spinal; cord stimulator    FL AV ANAST,UP ARM BASILIC VEIN TRANSPOSIT Left 5/15/2018    TRANSPOSITION STAGE II AV FISTULA - LEFT UPPER ARM performed by Kamala Anaya MD at [facility] ANGIOACCESS AV FISTULA Left 9/25/2018    SUPERFICIALIZATION AV FISTULA - LEFT ARM performed by Kamala Anaya MD at [address] [place] [address] W/VITRECTOMY ANY METH Left 4/10/2018    PARS PLANA VITRECTOMY 25 GAUGE RETINAL DETACHMENT REPAIR air fluid exchange, endolaser performed by Donaldo Felipe MD at [address] [place] [address] Drive TUNNELED VENOUS CATHETER PLACEMENT  11/15/2017    VITRECTOMY Left 04/10/2018    PARS PLANA VITRECTOMY; RETINAL DETACHMENT REPAIR; GAS BUBBLE; LASER LEFT EYE       Past Family Hx:  Reviewed with patient      Problem Relation Age of Onset    Breast Cancer Mother 61    Hypertension Mother     Heart Disease Father     Prostate Cancer Father     Breast Cancer Maternal Grandmother 61       Social Hx:  Reviewed with patient  Social History     Tobacco Use    Smoking status: Never Smoker    Smokeless tobacco: Never Used   Substance Use Topics    Alcohol use: No       Immunization History   Administered Date(s) Administered    Pneumococcal Polysaccharide ([id number]) 12/04/2018       Review of Systems  Review of Systems   Constitutional: Negative for appetite change, chills, fatigue, fever and unexpected weight change. HENT: Positive for hearing loss. Negative for congestion, ear discharge, nosebleeds, rhinorrhea, sneezing and sore throat. Eyes: Negative for pain and visual disturbance. Respiratory: Negative for cough, shortness of breath and wheezing. Cardiovascular: Negative for chest pain and palpitations. Gastrointestinal: Negative for abdominal distention, abdominal pain, blood in stool, constipation, diarrhea and nausea. Endocrine: Negative for cold intolerance, heat intolerance, polydipsia, polyphagia and polyuria. Genitourinary: Negative for decreased urine volume, difficulty urinating, dysuria, frequency, pelvic pain, urgency, vaginal bleeding and vaginal discharge. Musculoskeletal: Negative for arthralgias, back pain, gait problem, joint swelling, myalgias, neck pain and neck stiffness. Skin: Negative for color change, rash and wound. Allergic/Immunologic: Negative for environmental allergies and food allergies.    Neurological: Negative for dizziness, syncope, audiology appt. -     External Referral To Audiology    Return in about 1 month (around 4/28/2019).

## 2019-03-28 NOTE — PROGRESS NOTES
S: 62 y.o. female with   Chief Complaint   Patient presents with    Hearing Problem     can't seem to hear when people up; close, comes and goes; on Monday everything sounded distorted       Here to discuss a hearing problem present for about a month - intermittent issue with hearing. Currently on 3rd episode of \"hearing loss\" that returns after a few hours    BP Readings from Last 3 Encounters:   03/28/19 (!) 110/50   03/28/19 132/78   02/28/19 132/72       O: VS:  height is 5' 10\" (1.778 m) and weight is 198 lb (89.8 kg). Her blood pressure is 110/50 (abnormal) and her pulse is 76. Her respiration is 18 and oxygen saturation is 97%. AAO/NAD, appropriate affect for mood  CV:  RRR, no murmur  Resp: CTAB  TM WNL b/l    Impression/Plan:   1) Hearing Loss - refer to audiology/ENT          Health Maintenance Due   Topic Date Due    Diabetic foot exam  01/06/1966    Hepatitis B Vaccine (1 of 3 - Risk 3-dose series) 01/06/1975    DTaP/Tdap/Td vaccine (1 - Tdap) 01/06/1975    Cervical cancer screen  01/06/1977    Shingles Vaccine (1 of 2) 01/06/2006    Lipid screen  11/08/2018         Attending Physician Statement  I have discussed the case, including pertinent history and exam findings with the resident. I agree with the documented assessment and plan.       Cathryn Araiza MD

## 2019-04-01 ASSESSMENT — ENCOUNTER SYMPTOMS
RHINORRHEA: 0
EYE PAIN: 0
COUGH: 0
DIARRHEA: 0
BACK PAIN: 0
CONSTIPATION: 0
SHORTNESS OF BREATH: 0
WHEEZING: 0
SORE THROAT: 0
ABDOMINAL DISTENTION: 0
COLOR CHANGE: 0
NAUSEA: 0
ABDOMINAL PAIN: 0
BLOOD IN STOOL: 0

## 2019-04-08 ENCOUNTER — TELEPHONE (OUTPATIENT)
Dept: PAIN MANAGEMENT | Age: 64
End: 2019-04-08

## 2019-04-08 NOTE — TELEPHONE ENCOUNTER
Bakari Weeks called in, she went to  meds and did not get any Percocet, [facility] stated there was not a script there. Please take a look and let me know.

## 2019-04-29 ENCOUNTER — OFFICE VISIT (OUTPATIENT)
Dept: FAMILY MEDICINE CLINIC | Age: 64
End: 2019-04-29
Payer: COMMERCIAL

## 2019-04-29 VITALS
HEART RATE: 74 BPM | BODY MASS INDEX: 27.92 KG/M2 | OXYGEN SATURATION: 97 % | HEIGHT: 70 IN | DIASTOLIC BLOOD PRESSURE: 54 MMHG | WEIGHT: 195 LBS | SYSTOLIC BLOOD PRESSURE: 98 MMHG | RESPIRATION RATE: 16 BRPM

## 2019-04-29 DIAGNOSIS — R20.2 NUMBNESS AND TINGLING IN BOTH HANDS: ICD-10-CM

## 2019-04-29 DIAGNOSIS — I25.10 ATHEROSCLEROSIS OF NATIVE CORONARY ARTERY OF NATIVE HEART WITHOUT ANGINA PECTORIS: ICD-10-CM

## 2019-04-29 DIAGNOSIS — N18.4 TYPE 2 DIABETES MELLITUS WITH STAGE 4 CHRONIC KIDNEY DISEASE, UNSPECIFIED WHETHER LONG TERM INSULIN USE (HCC): Primary | ICD-10-CM

## 2019-04-29 DIAGNOSIS — H91.93 HEARING PROBLEM OF BOTH EARS: ICD-10-CM

## 2019-04-29 DIAGNOSIS — E11.29 TYPE 2 DIABETES MELLITUS WITH OTHER DIABETIC KIDNEY COMPLICATION, WITH LONG-TERM CURRENT USE OF INSULIN (HCC): ICD-10-CM

## 2019-04-29 DIAGNOSIS — G89.29 CHRONIC RIGHT SHOULDER PAIN: ICD-10-CM

## 2019-04-29 DIAGNOSIS — E11.22 TYPE 2 DIABETES MELLITUS WITH STAGE 4 CHRONIC KIDNEY DISEASE, UNSPECIFIED WHETHER LONG TERM INSULIN USE (HCC): Primary | ICD-10-CM

## 2019-04-29 DIAGNOSIS — M25.511 CHRONIC RIGHT SHOULDER PAIN: ICD-10-CM

## 2019-04-29 DIAGNOSIS — R20.0 NUMBNESS AND TINGLING IN BOTH HANDS: ICD-10-CM

## 2019-04-29 DIAGNOSIS — I15.0 RENOVASCULAR HYPERTENSION: ICD-10-CM

## 2019-04-29 DIAGNOSIS — Z79.4 TYPE 2 DIABETES MELLITUS WITH OTHER DIABETIC KIDNEY COMPLICATION, WITH LONG-TERM CURRENT USE OF INSULIN (HCC): ICD-10-CM

## 2019-04-29 LAB — HBA1C MFR BLD: 7.1 %

## 2019-04-29 PROCEDURE — 3045F PR MOST RECENT HEMOGLOBIN A1C LEVEL 7.0-9.0%: CPT | Performed by: FAMILY MEDICINE

## 2019-04-29 PROCEDURE — 3017F COLORECTAL CA SCREEN DOC REV: CPT | Performed by: FAMILY MEDICINE

## 2019-04-29 PROCEDURE — 3014F SCREEN MAMMO DOC REV: CPT | Performed by: FAMILY MEDICINE

## 2019-04-29 PROCEDURE — 2022F DILAT RTA XM EVC RTNOPTHY: CPT | Performed by: FAMILY MEDICINE

## 2019-04-29 PROCEDURE — 1036F TOBACCO NON-USER: CPT | Performed by: FAMILY MEDICINE

## 2019-04-29 PROCEDURE — G8598 ASA/ANTIPLAT THER USED: HCPCS | Performed by: FAMILY MEDICINE

## 2019-04-29 PROCEDURE — G8427 DOCREV CUR MEDS BY ELIG CLIN: HCPCS | Performed by: FAMILY MEDICINE

## 2019-04-29 PROCEDURE — G8419 CALC BMI OUT NRM PARAM NOF/U: HCPCS | Performed by: FAMILY MEDICINE

## 2019-04-29 PROCEDURE — 99213 OFFICE O/P EST LOW 20 MIN: CPT | Performed by: FAMILY MEDICINE

## 2019-04-29 PROCEDURE — 83036 HEMOGLOBIN GLYCOSYLATED A1C: CPT | Performed by: FAMILY MEDICINE

## 2019-04-29 RX ORDER — OMEPRAZOLE 20 MG/1
CAPSULE, DELAYED RELEASE ORAL
Qty: 30 CAPSULE | Refills: 5 | Status: SHIPPED | OUTPATIENT
Start: 2019-04-29 | End: 2019-10-09 | Stop reason: SDUPTHER

## 2019-04-29 RX ORDER — AMLODIPINE BESYLATE 5 MG/1
5 TABLET ORAL DAILY
Qty: 30 TABLET | Refills: 3 | Status: SHIPPED | OUTPATIENT
Start: 2019-04-29 | End: 2019-05-21 | Stop reason: ALTCHOICE

## 2019-04-29 RX ORDER — ATORVASTATIN CALCIUM 40 MG/1
40 TABLET, FILM COATED ORAL DAILY
Qty: 30 TABLET | Refills: 3 | Status: SHIPPED | OUTPATIENT
Start: 2019-04-29 | End: 2019-08-16 | Stop reason: SDUPTHER

## 2019-04-29 NOTE — PROGRESS NOTES
[facility] [address]  Precepting Note    Subjective:  ESRD  Had HD today  BP has been low- is on Norvasc    Chronic pain issue  Sees pain management  On narcotics  Pain worse in L hand    ROS otherwise negative     Past medical, surgical, family and social history were reviewed, non-contributory, and unchanged unless otherwise stated. Objective:    BP (!) 98/54   Pulse 74   Resp 16   Ht 5' 10\" (1.778 m)   Wt 195 lb (88.5 kg)   SpO2 97%   BMI 27.98 kg/m²     Exam is as noted by resident with the following changes, additions or corrections:    General:  NAD; alert & oriented x 3   Heart:  RRR, no murmurs, gallops, or rubs. Lungs:  CTA bilaterally, no wheeze, rales or rhonchi  Abd: bowel sounds present, nontender, nondistended, no masses  MS Decreased ROM R shoulder   Thenar and hypothenar atrophy L hand   Neg Tinnel and Phalen      Assessment/Plan:  ESRD  Hypotension  Frozen shoulder  LUE neuropathy, uncertain etiology    Plan   Decrease Norvasc   Image   EMG   Per renal      Attending Physician Statement  I have reviewed the chart, including any radiology or labs. I have discussed the case, including pertinent history and exam findings with the resident. I agree with the assessment, plan and orders as documented by the resident. Please refer to the resident note for additional information.       Electronically signed by Helena Woodward MD on 4/29/2019 at 3:35 PM

## 2019-04-29 NOTE — PROGRESS NOTES
HPI    Hearing loss  Intermittent since august  Hearing test was normal    Fatigue  Just had dialysis  Feels poorly after dialysis  BP is low   HTN? BP is lower end  Is on Norvasc 10 mg   Started in 2017  Skips it on Dialysis days   Is on Bumex 2 mg 3 x weekly  With diuretic has urine     Left hand pain   Pain in her hand dorsal aspect that travels up  Does have numbness and tingling is all her hand  Drops things often   Difficulty with holding and grasping   States she has still syndrome? Right shoulder pain  Severe  Decreased ROM  Chronic issue  Right sided mastectomy 10 years ago due to breast cancer   Follows with chronic pain      Patient's past medical, surgical, social and/or family history reviewed, updated in chart, and are non-contributory (unless otherwise stated). Medications and allergies also reviewed and updated in chart. BP (!) 98/54   Pulse 74   Resp 16   Ht 5' 10\" (1.778 m)   Wt 195 lb (88.5 kg)   SpO2 97%   BMI 27.98 kg/m²     Review of Systems  ROS is negative unless mentioned in HPI    Physical Exam  Gen: Well developed, well nourished  HENT: Normocephalic, atraumatic  Eyes: PERRL, EOM normal  Neck: ROM normal, supple, no cervical adenopathy  Heart: Nl RR, S1, S2, no murmurs  Lungs: CTA bilaterally, no wheezes  Abdomen: Soft, non distended, non tender  Musculoskeletal: No pedal edema, no deformity   tenderness cervical region-- no step off,   tenderness right shoulder, compensates for abduction by leaning and aiding with the other arm, diffuse crepitus with passive ROM  Left shoulder normal ROM, no tenderness  Hands--> Negative Tinel and Phalen Thenar atrophy noted  Neurological: Alert, Oriented x 3  Skin: Warm, dry  Psych: Mood and affect normal, thought content appropriate    Assessment:  Hearing loss--refer to ENT. Unsure what is causing this transient subjective hearing loss. Might be due to uremia when due for dialysis?  Hearing test normal    Chronic right shoulder pain: suspect labral tear? Will get a basic set of radiograph imaging. Offered PT. Viridiana Pantoja Difficulty fitting into her schedule with dialysis. Will also refer to Dr Jadiel Arenas    Numbness/tingling hands and pain left hand: EMG studies    HTN: decrease Norvasc to 5 mg qdaily with the intention to eventually cease medication. Plan:  As above. Call or go to ED immediately if symptoms worsen or persist.  1 month, Or sooner if necessary. Counseled regarding above diagnosis, including possible risks and complications,  especially if left uncontrolled. Patient and/or guardian verbalizes understanding and agrees with above counseling,assessment and plan. All questions answered.      Electronically signed by Sailaja Pereira MD on 4/29/2019 at 5:11 PM

## 2019-05-03 NOTE — TELEPHONE ENCOUNTER
That is with meals TID. Max dose per day is 12 units.      Thank you,  Electronically signed by Saskia Hdez MD on 5/3/2019 at 3:17 PM

## 2019-05-15 ENCOUNTER — OFFICE VISIT (OUTPATIENT)
Dept: ORTHOPEDIC SURGERY | Age: 64
End: 2019-05-15
Payer: COMMERCIAL

## 2019-05-15 VITALS — HEIGHT: 70 IN | BODY MASS INDEX: 27.06 KG/M2 | WEIGHT: 189 LBS

## 2019-05-15 DIAGNOSIS — G89.29 CHRONIC RIGHT SHOULDER PAIN: Primary | ICD-10-CM

## 2019-05-15 DIAGNOSIS — M25.511 CHRONIC RIGHT SHOULDER PAIN: Primary | ICD-10-CM

## 2019-05-15 PROCEDURE — 3017F COLORECTAL CA SCREEN DOC REV: CPT | Performed by: ORTHOPAEDIC SURGERY

## 2019-05-15 PROCEDURE — 99203 OFFICE O/P NEW LOW 30 MIN: CPT | Performed by: ORTHOPAEDIC SURGERY

## 2019-05-15 PROCEDURE — G8427 DOCREV CUR MEDS BY ELIG CLIN: HCPCS | Performed by: ORTHOPAEDIC SURGERY

## 2019-05-15 PROCEDURE — 20610 DRAIN/INJ JOINT/BURSA W/O US: CPT | Performed by: ORTHOPAEDIC SURGERY

## 2019-05-15 PROCEDURE — G8419 CALC BMI OUT NRM PARAM NOF/U: HCPCS | Performed by: ORTHOPAEDIC SURGERY

## 2019-05-15 PROCEDURE — 1036F TOBACCO NON-USER: CPT | Performed by: ORTHOPAEDIC SURGERY

## 2019-05-15 PROCEDURE — G8598 ASA/ANTIPLAT THER USED: HCPCS | Performed by: ORTHOPAEDIC SURGERY

## 2019-05-15 PROCEDURE — 3014F SCREEN MAMMO DOC REV: CPT | Performed by: ORTHOPAEDIC SURGERY

## 2019-05-15 RX ORDER — LIDOCAINE HYDROCHLORIDE 10 MG/ML
4 INJECTION, SOLUTION INFILTRATION; PERINEURAL ONCE
Status: COMPLETED | OUTPATIENT
Start: 2019-05-15 | End: 2019-05-15

## 2019-05-15 RX ORDER — TRIAMCINOLONE ACETONIDE 40 MG/ML
40 INJECTION, SUSPENSION INTRA-ARTICULAR; INTRAMUSCULAR ONCE
Status: COMPLETED | OUTPATIENT
Start: 2019-05-15 | End: 2019-05-15

## 2019-05-15 RX ADMIN — LIDOCAINE HYDROCHLORIDE 4 ML: 10 INJECTION, SOLUTION INFILTRATION; PERINEURAL at 15:50

## 2019-05-15 RX ADMIN — TRIAMCINOLONE ACETONIDE 40 MG: 40 INJECTION, SUSPENSION INTRA-ARTICULAR; INTRAMUSCULAR at 15:51

## 2019-05-15 NOTE — PROGRESS NOTES
Assisted Dr. Helena Miller with injection of 4 cc lidocaine/1 cc kenalog in R shoulder. Patient tolerated procedure well. Verbal instructions were given.

## 2019-05-15 NOTE — PROGRESS NOTES
New Shoulder Patient Visit     Referring Provider:   Johanna Zee MD  [address] [place] [address]    CHIEF COMPLAINT:   Chief Complaint   Patient presents with    Shoulder Pain     (R) shoulder pain x 2 months worse, she states that she is unable to lift arm, been bothersome since she had mastectomy 11 years ago; pain with any motion    X-ray      (R) shoulder XR today    Other     patient is on kidney dialysis, port on left side        HPI:      Shi Quiñonez is a 62y.o. year old female who is seen today  for evaluation of right shoulder pain. She states the pain has been present for several years. It has done progressively worse over the past 2 months. She does have a complicated medical history including mastectomy on the right side with lymph node dissection. She also has end-stage renal disease and is on dialysis. She has had no treatment for the shoulder. She is right-hand dominant. PAST MEDICAL HISTORY  Past Medical History:   Diagnosis Date    Acute infection of bone ([address]     infection of rt foot, resolved.     Anemia of chronic disease     Breast cancer ([address]     right breast, 2008/ bladder, 2006    CAD (coronary artery disease)     Chronic diastolic CHF (congestive heart failure) ([address] 09/23/2014 9/23/14- echocardiogram revealed moderate LV concentric hypertrophy, stage III diastolic dysfunction, mild tricuspid regurgitation    CKD (chronic kidney disease) stage 4, GFR 15-29 ml/min ([facility])     Diabetic retinopathy ([address]     Glaucoma     Hemodialysis patient ([address]     [facility]  mon wed fri    Hyperkalemia, diminished renal excretion 11/9/2017    Hypertension     Hypoglycemia unawareness in type 1 diabetes mellitus ([address] 11/7/2017    Insulin dependent type 2 diabetes mellitus (HCC)     Neuropathy     feet    Osteomyelitis due to secondary diabetes ([address]     rt great toe with amputation    Patient is [employer] 11/7/2017    Refusal of blood product patient states she dose not take blood transfusion    Ventricular hypertrophy     Vitreous hemorrhage ([address]     left eye       PAST SURGICAL HISTORY  Past Surgical History:   Procedure Laterality Date    AMPUTATION      right great toe    ANKLE SURGERY      correction on charcot joint of right ankle    CATARACT REMOVAL      bilateral    CHOLECYSTECTOMY      CYSTOSCOPY      DIALYSIS FISTULA CREATION Left 01/31/2018    upper arm/Dr. Wesley Maya    ECHO COMPL W DOP COLOR FLOW  2/14/2013         ECHO COMPLETE  9/17/2013         MASTECTOMY      right    OTHER SURGICAL HISTORY  9/27/2011    PPV, membranectomy, laser Right eye    OTHER SURGICAL HISTORY  insertion lumbar drain insertion    10/12/`14    OTHER SURGICAL HISTORY  10/22/15    percutaneous lead placement for spinal cord stimulator    OTHER SURGICAL HISTORY  11/3/15    Spinal; cord stimulator    MA AV ANAST,UP ARM BASILIC VEIN TRANSPOSIT Left 5/15/2018    TRANSPOSITION STAGE II AV FISTULA - LEFT UPPER ARM performed by Morris Almeida MD at [address] [place] [address] ANGIOACCESS AV FISTULA Left 9/25/2018    SUPERFICIALIZATION AV FISTULA - LEFT ARM performed by Morris Almeida MD at [address] [place] [address] W/VITRECTOMY ANY METH Left 4/10/2018    PARS PLANA VITRECTOMY 25 GAUGE RETINAL DETACHMENT REPAIR air fluid exchange, endolaser performed by Yaid Benitez MD at [address] [place] [address] Drive TUNNELED VENOUS CATHETER PLACEMENT  11/15/2017    VITRECTOMY Left 04/10/2018    PARS PLANA VITRECTOMY; RETINAL DETACHMENT REPAIR; GAS BUBBLE; LASER LEFT EYE       FAMILY HISTORY   Family History   Problem Relation Age of Onset    Breast Cancer Mother 61    Hypertension Mother     Heart Disease Father     Prostate Cancer Father     Breast Cancer Maternal Grandmother 61       SOCIAL HISTORY  Social History     Occupational History    Not on file   Tobacco Use    Smoking status: Never Smoker    Smokeless tobacco: Never Used Disp: 60 tablet, Rfl: 3    Brimonidine Tartrate-Timolol (COMBIGAN OP), Apply 1 drop to eye 2 times daily OD, Disp: , Rfl:     cyanocobalamin 1000 MCG tablet, Take 1 tablet by mouth daily. , Disp: 30 tablet, Rfl: 3    darbepoetin fani-polysorbate (ARANESP) 40 MCG/0.4ML SOLN injection, Inject 0.4 mLs into the skin once a week. (Patient taking differently: Inject 40 mcg into the skin every 14 days ), Disp: 8.4 mL, Rfl: 0    Bimatoprost (LUMIGAN OP), Place 1 drop into both eyes daily Indications: every evening As directed, Disp: , Rfl:     gabapentin (NEURONTIN) 100 MG capsule, Take 2 capsules by mouth every evening for 30 days. , Disp: 60 capsule, Rfl: 1    aspirin EC 81 MG EC tablet, Take 81 mg by mouth daily Indications: patient taking T, Th, Sat, and Sun Last dose 9-19, Disp: , Rfl:     ALLERGIES  Allergies   Allergen Reactions    Lasix [Furosemide] Other (See Comments)     States her kidneys shut down       Controlled Substances Monitoring:        REVIEW OF SYSTEMS:     Constitutional:  Negative for weight loss, fevers, chills, fatigue  Cardiovascular: Negative for chest pain, palpitations  Pulmonary: Negative for shortness of breath, labored breathing, cough  GI: negative for abdominal pain, nausea, vomitting   MSK: per HPI  Skin: negative for rash, open wounds    All other systems reviewed and are negative           PHYSICAL EXAM     Vitals:    05/15/19 1454   Weight: 189 lb (85.7 kg)   Height: 5' 10\" (1.778 m)       Height: 5' 10\" (1.778 m)  Weight: 189 lb BMI:  Body mass index is 27.12 kg/m². General: The patient is alert and oriented x 3, appears to be stated age and in no distress. HEENT: head is normocephalic, atraumatic. EOMI. Neck: supple, trachea midline, no thyromegaly   Cardiovascular: peripheral pulses palpable.   Normal Capillary refill   Respiratory: breathing unlabored, chest expansion symmetric   Skin: no rash, no open wounds, no erythema  Psych: normal affect; mood stable  Neurologic: gait normal, sensation grossly intact in extremities  MSK:    Cervical Spine: There is no tenderness to palpation along the cervical spine. Range of motion is normal.  Spurling's is negative    Shoulder Exam:   On exam of the shoulder, she is very limited function including very limited passive and active range of motion. There is significant crepitus of the glenohumeral joint. There is tenderness diffusely over the shoulder. IMAGING:     XRAY:  3 views of the right shoulder show end-stage osteoarthritis of the right shoulder with mild superior humeral head migration and bone-on-bone glenohumeral arthritis    Radiographic findings reviewed with patient    Procedure Note:  Right Shoulder steroid injection     The Right shoulder was identified as the injection site. The risk and benefits of a cortisone injection were explained and the patient consented to the injection. Under sterile conditions, the subacromial space was injected just inferior to the posterolateral edge of the acromion with a mixture of 40 mg of Kenalog, 4 cc  1% Lidocaine without complication. A sterile bandage was applied. Administrations This Visit     lidocaine 1 % injection 4 mL     Admin Date  05/15/2019 Action  Given Dose  4 mL Route  Intra-articular Administered By  Suri Claudio RN          triamcinolone acetonide [facility]) injection 40 mg     Admin Date  05/15/2019 Action  Given Dose  40 mg Route  Intra-articular Administered By  Suri Claudio RN                  ASSESSMENT   Right shoulder end-stage osteoarthritis      PLAN  We discussed her shoulder today. She has not had treatment. She is interested in steroid injection. This was performed. We also gave her prescription for physical therapy. We will see her back in 6-8 weeks.         Lupis Jonas MD  Orthopaedic Surgery   5/15/19  2:57 PM

## 2019-05-16 ENCOUNTER — HOSPITAL ENCOUNTER (OUTPATIENT)
Dept: NEUROLOGY | Age: 64
Discharge: HOME | End: 2019-05-16
Payer: COMMERCIAL

## 2019-05-16 VITALS — HEIGHT: 70 IN | BODY MASS INDEX: 26.48 KG/M2 | WEIGHT: 185 LBS

## 2019-05-16 DIAGNOSIS — R20.0 NUMBNESS AND TINGLING IN BOTH HANDS: ICD-10-CM

## 2019-05-16 DIAGNOSIS — R20.2 NUMBNESS AND TINGLING IN BOTH HANDS: ICD-10-CM

## 2019-05-16 PROCEDURE — 95886 MUSC TEST DONE W/N TEST COMP: CPT | Performed by: PHYSICAL MEDICINE & REHABILITATION

## 2019-05-16 PROCEDURE — 95886 MUSC TEST DONE W/N TEST COMP: CPT

## 2019-05-16 PROCEDURE — 95913 NRV CNDJ TEST 13/> STUDIES: CPT | Performed by: PHYSICAL MEDICINE & REHABILITATION

## 2019-05-16 PROCEDURE — 95911 NRV CNDJ TEST 9-10 STUDIES: CPT

## 2019-05-16 NOTE — PROCEDURES
[facility]  [address], [address]  Phone: [phone]  Fax: [phone]      Referring Provider: Chula Clark MD  Primary Care Physician: Fernando Rodriguez MD  Patient Name: Christopher Palmer  Patient YOB: 1955  Gender: female  BMI: Body mass index is 26.54 kg/m². Height 5' 10\" (1.778 m), weight 185 lb (83.9 kg). 5/16/2019    Description of clinical problem:   Numbness and tingling in both hands,  weakness. ESRD on dialysis, h/o breast ca s/p R mastectomy chemo/rad, rue lymphedema. Pain: Yes  ; Numbness/tingling: Yes; Weakness: Yes       Brief physical exam:   Sensory deficit: Yes; Weakness: Yes; Atrophy:  Yes- significant R>L bilateral thenar atrophy, mild b/l hand intrinsic atrophy; Reflex abnormality: Yes    Study Limitations:  Right port, Left upper extremity fistula, Right upper extremity lymphedema     Motor NCS      Nerve / Sites Lat. Amplitude Amp. 1-2 Distance Lat Diff Velocity Temp.    ms mV % cm ms m/s °C   R Median - APB      Wrist 5.83 0.7 100 8   32.2      Elbow 12.92 0.7 93.6 25 7.08 35 32.2   L Median - APB      Wrist 9.06 3.2 100 8   32.9      Elbow 18.80 1.2 37.3 27 9.74 28 32.2   R Ulnar - ADM      Wrist 4.64 2.7 100 8   32      B. Elbow 12.71 0.9 33.1 24 8.07 30 32      A. Elbow 17.08 0.8 31.4 10 4.38 23 32   L Ulnar - ADM      Wrist 4.22 2.7 100 8   32.5      B. Elbow 9.84 2.5 92.6 25 5.63 44 32.5      A. Elbow 12.19 2.5 92.6 10 2.34 43 32.6       Sensory NCS      Nerve / Sites Onset Lat Peak Lat PP Amp Distance Velocity Temp.    ms ms µV cm m/s °C   R Median - Digit II (Antidromic)      Mid Palm NR NR NR 7 NR 32      Wrist NR NR NR 14 NR 32   L Median - Digit II (Antidromic)      Mid Palm NR NR NR 7 NR 32.5      Wrist NR NR NR 14 NR 32.5   R Ulnar - Digit V (Antidromic)      Wrist 6.15 7.03 10.7 14 23 32.9   L Ulnar - Digit V (Antidromic)      Wrist 6.67 7.66 9.6 14 21 32.5   R Radial - Anatomical snuff box (Forearm)      Forearm 5.05 6.20 8.1 10 20 32.1   L Radial - Anatomical snuff box (Forearm)      Forearm 5.05 5.73 6.0 10 20 32.6       F  Wave      Nerve F Lat M Lat F-M Lat    ms ms ms   R Median - APB NR NR NR   R Ulnar - ADM 40.8 4.3 36.5   L Median - APB 52.1 10.2 41.9   L Ulnar - ADM 41.0 2.6 38.4       EMG         EMG Summary Table     Spontaneous MUAP Recruitment   Muscle IA Fib PSW Fasc H.F. Amp Dur. PPP Pattern   R. Deltoid N None None None None N N N N   R. Biceps brachii N None None None None N N Few N   R. Triceps brachii N None None None None N N Few N   R. Pronator teres N None None None None 1+ N Few N   R. Flexor carpi radialis N None None None None N N Few N   R. Flexor carpi ulnaris N None None None None 1+ 1+ 1+ Sl Decr   R. First dorsal interosseous N None Few None None 2+ 2+ N Decr   R. Extensor indicis proprius N None None None None 1+ 2+ 1+ Sl Decr   R. Abductor pollicis brevis N None Few None None    Distant MUAPs   R. Cervical paraspinals (mid) N None None None None       R. Cervical paraspinals (low) N None 1+ None None       L. Cervical paraspinals (mid) N None None None None       L. Cervical paraspinals (low) N None 2+ None None       L. Deltoid N None None None None N N N N   L. Biceps brachii N None None None None N N N N   L. Triceps brachii N None None None None 1+ N Few N   L. Pronator teres N None None None None 1+ N Few N   L. Flexor carpi radialis N None None None None N N Few N   L. Flexor carpi ulnaris N None None None None N N N N   L. First dorsal interosseous N None None None None Giant 2+ 1+ Sl Decr   L. Extensor indicis proprius N None None None None 2+ 2+ 1+ Sl Decr   L. Abductor pollicis brevis N None None None None Giant 2+ 2+ Markedly Dec           Summary of Findings:   Nerve conduction studies:   Sensory nerve conduction studies of the bilateral median nerves were non-recordable.  Sensory nerve conduction studies of the bilateral ulnar nerves revealed delayed distal latencies and normal SNAP amplitudes. Sensory nerve conduction studies of the bilateral radial nerves revealed delayed distal latencies and decreased SNAP amplitudes. Motor nerve conduction studies of the bilateral median nerves revealed delayed distal latencies, decreased CMAP amplitudes, and decreased conduction velocities. Motor nerve conduction study of the right ulnar nerve revealed delayed distal latency, decreased CMAP amplitude, and decreased conduction velocity both about the elbow and in the distal segment, with focal slowing and conduction block about the elbow. Motor nerve conduction study of the left ulnar nerve revealed delayed distal latency, decreased CMAP amplitude, and decreased conduction velocity in the distal segment with normal conduction velocity about the elbow. F-wave study of the right median nerve was non-recordable. F-wave studies of the left median and bilateral ulnar nerves revealed delayed latencies. Needle EMG:   · Needle EMG was performed using a monopolar needle. · The following abnormalities were seen on needle EMG: Active denervation signs were noted in the right APB as well as the absence of any motor unit action potential activation--only distant MUAPs were observed. Acute and chronic neuropathic changes were noted in the right first dorsal interosseous. Chronic neuropathic changes were noted in the right biceps, bilateral triceps, bilateral pronator teres, bilateral flexor carpi radialis, right flexor carpi ulnaris, left first dorsal interosseous, bilateral extensor indicis proprius, and left abductor pollicis brevis. All other muscles tested, as listed in the table above, demonstrated normal amplitude, duration, phases, and recruitment, without evidence of active denervation. Diagnostic Interpretation: This study was Abnormal  .   1.  There is electrodiagnostic evidence of focal median mononeuropathies at or about the wrists bilaterally, severe in degree, laboratory policies and procedures which can be provided upon request. All abnormal values are identified in the table.  Laboratory normal values can also be provided upon request.       Cc: MD Yumiko Soriano MD

## 2019-05-21 ENCOUNTER — OFFICE VISIT (OUTPATIENT)
Dept: FAMILY MEDICINE CLINIC | Age: 64
End: 2019-05-21
Payer: COMMERCIAL

## 2019-05-21 VITALS
SYSTOLIC BLOOD PRESSURE: 114 MMHG | HEIGHT: 70 IN | WEIGHT: 197 LBS | HEART RATE: 69 BPM | DIASTOLIC BLOOD PRESSURE: 57 MMHG | RESPIRATION RATE: 18 BRPM | BODY MASS INDEX: 28.2 KG/M2

## 2019-05-21 DIAGNOSIS — T30.0 SKIN BURN: ICD-10-CM

## 2019-05-21 DIAGNOSIS — I10 HYPERTENSION, UNSPECIFIED TYPE: ICD-10-CM

## 2019-05-21 DIAGNOSIS — G89.29 CHRONIC RIGHT SHOULDER PAIN: Primary | ICD-10-CM

## 2019-05-21 DIAGNOSIS — G56.03 BILATERAL CARPAL TUNNEL SYNDROME: ICD-10-CM

## 2019-05-21 DIAGNOSIS — M25.511 CHRONIC RIGHT SHOULDER PAIN: Primary | ICD-10-CM

## 2019-05-21 PROCEDURE — 3014F SCREEN MAMMO DOC REV: CPT | Performed by: FAMILY MEDICINE

## 2019-05-21 PROCEDURE — 99213 OFFICE O/P EST LOW 20 MIN: CPT | Performed by: FAMILY MEDICINE

## 2019-05-21 PROCEDURE — G8419 CALC BMI OUT NRM PARAM NOF/U: HCPCS | Performed by: FAMILY MEDICINE

## 2019-05-21 PROCEDURE — 1036F TOBACCO NON-USER: CPT | Performed by: FAMILY MEDICINE

## 2019-05-21 PROCEDURE — G8598 ASA/ANTIPLAT THER USED: HCPCS | Performed by: FAMILY MEDICINE

## 2019-05-21 PROCEDURE — G8427 DOCREV CUR MEDS BY ELIG CLIN: HCPCS | Performed by: FAMILY MEDICINE

## 2019-05-21 PROCEDURE — 3017F COLORECTAL CA SCREEN DOC REV: CPT | Performed by: FAMILY MEDICINE

## 2019-05-21 RX ORDER — ALLOPURINOL 100 MG/1
100 TABLET ORAL DAILY
Qty: 30 TABLET | Refills: 5 | Status: SHIPPED | OUTPATIENT
Start: 2019-05-21 | End: 2019-11-08 | Stop reason: SDUPTHER

## 2019-05-21 NOTE — PROGRESS NOTES
HPI    Chronic right shoulder pain  Seen by Dr Jaycob Miguel  Had injection in right shoulder  Temporary relief   Might have overdone it   Currently still having shoulder pain  Might be starting physical therapy  Has been trying to fit it in her schedule    EMG  Carpal tunnel bilaterally   Does not currently use splints   Increased pain in her arms bilaterally, wrists  Basically pain from her neck down     HTN  No lightheadedness  No cp   No sob  No palpitations  We had decreased her Norvasc to 5 mg  Well tolerated     Hearing loss  Episodic  She states her hearing is almost completely gone  If she relaxes an hour or so it will come back   Has happened a handful of times--currently is resolved  Last episode was in March   Hearing test was normal  Will be seeing ENT shortly   Is taking Bumex 3 times a week  Oncologist believes it might be due to a pinched nerve? Patient's past medical, surgical, social and/or family history reviewed, updated in chart, and are non-contributory (unless otherwise stated). Medications and allergies also reviewed and updated in chart. BP (!) 114/57   Pulse 69   Resp 18   Ht 5' 10\" (1.778 m)   Wt 197 lb (89.4 kg)   BMI 28.27 kg/m²     Review of Systems  ROS is negative unless mentioned in HPI     Physical Exam  Gen: Well developed, well nourished  HENT: Normocephalic, atraumatic  Eyes: PERRL, EOM normal  Neck: ROM normal, supple, no cervical adenopathy  Heart: Nl RR, S1, S2, no murmurs  Lungs: CTA bilaterally, no wheezes  Abdomen: Soft, non distended, non tender  Musculoskeletal: trace pedal edema bilaterally, decreased ROM right shoulder, tenderness posterior right shoulder   Neurological: Alert, Oriented x 3  Skin: Warm, dry  Psych: Mood and affect normal, thought content appropriate    Assessment:    Chronic right shoulder pain--continue exercises at home and encouraged to attend physical therapy. Continue Dr Tato Boyd recommendations.      Carpal tunnel--splints to wear even at night    HTN--stop Norvasc. Weaned down to 5 mg qdaily. Blood pressure remains lower end of normal.     Skin burn --pt mentions this as we are wrapping up. Provided script for Rik. Plan:  As above. Call or go to ED immediately if symptoms worsen or persist.  2 months, Or sooner if necessary. Counseled regarding above diagnosis, including possible risks and complications,  especially if left uncontrolled. Patient and/or guardian verbalizes understanding and agrees with above counseling,assessment and plan. All questions answered.      Electronically signed by Sailaja Pereira MD on 5/21/2019 at 8:33 AM

## 2019-05-21 NOTE — PROGRESS NOTES
[facility] [address]  Precepting Note    Subjective:  Right shoulder pain, chronic  Saw ortho  Declines PT due to schedule. Xray showed severe arthritis  Tx with injection  Feels back to baseline  Is goingt o try PT aftwer all. HEP provided. EMG requested. Got it done for pain and numbness radiating from herneck. Showed CTS BL. Not using splints. HTN  norvasc 10mg, BP on the lower side chronically. Dose was weaned down to 5mg. Neg cardiac ROS. BP continues on the low side. Hearing loss  Episodic. Almost completely gone during and episode and then about an hour later it resolves. Had a hearing test done in march  And was normal per patient. Sees ENT end of June. Takes bumex a few times. ROS otherwise negative     Past medical, surgical, family and social history were reviewed, non-contributory, and unchanged unless otherwise stated. Objective:    BP (!) 114/57   Pulse 69   Resp 18   Ht 5' 10\" (1.778 m)   Wt 197 lb (89.4 kg)   BMI 28.27 kg/m²     Exam is as noted by resident with the following changes, additions or corrections:    General:  NAD; alert & oriented x 3   Heart:  RRR, no murmurs, gallops, or rubs. Lungs:  CTA bilaterally, no wheeze, rales or rhonchi  Abd: bowel sounds present, nontender, nondistended, no masses  Extrem:  No clubbing, cyanosis, or edema. Right shoulder with signif crepitus. ENT: ear exam normal.     Assessment/Plan:  Shoulder pain  Start PT    HTN  Stop norvasc, monitor pressure closely. Right down pressures from dialysis and bring in with next appt. CTS  Wrist splints to start    Hearing loss , episodic  ENT appt upcoming. Attending Physician Statement  I have reviewed the chart, including any radiology or labs. I have discussed the case, including pertinent history and exam findings with the resident. I agree with the assessment, plan and orders as documented by the resident.   Please refer to the resident note for additional information.       Electronically signed by Judy Carrion MD on 5/21/2019 at 8:48 AM

## 2019-06-05 NOTE — PROGRESS NOTES
[facility], [address] [place] [address] [place]  [phone]    Follow up Note      Hollie Keith     Date of Visit:  6/6/2019    CC:  Patient presents for follow up   Chief Complaint   Patient presents with    Follow-up     back/legs/shoulders       HPI:    Pain is a little better to right shoulder. Had a CSI to right shoulder with Dr. Mary Ellen Cheung. Lower back is painful. Stinging to both legs. Taking tylenol. Using pain cream.  On average,pain is perceived as moderate (6 pain scale). Change in quality of symptoms:no. Patient satisfaction with analgesia: Good. Medication side effects:none. Recent diagnostic testing:none. Recent interventional procedures:none. She has been on anticoagulation medications to include heparin through dialysis. The patient  has not been on herbal supplements. The patient is diabetic. Imaging:   Cervical xray 8/2018 -   Alignment of the vertebral bodies appears to be near-anatomic   No fracture or foreign body is identified. The disc spaces demonstrate severe degenerative changes. There is no significant loss of the vertebral body height   The are severe degenerative changes involving the facets.      CT lumbar 9/2014 (pre-surgical) -   IMPRESSION:    1. Postsurgical changes, status post laminectomy at L3-L4 and   L4-L5 and fusion of L3-L4 and L5.   2. No evidence for recurrent disc herniation. No dural or epidural   abscess or hematoma. 3. Pedicle screws are in good position. Alignment is satisfactory.      Lumbar myelogram 9/2014 -   IMPRESSION:   1. Severe spinal canal stenosis in L4-L5 level and moderate to   severe in L3-L4 level as described above.       2. Distraction of the joint space of the facet joints of L4-L5   with hypertrophy which explains the anterolisthesis of L4 in   relation to L5.       3.  Degenerative disc disease predominant seen in the L4-L5 level.       4. Encroachment of the neural foramina Anemia of chronic disease     Breast cancer ([address]     right breast, 2008/ bladder, 2006    CAD (coronary artery disease)     Chronic diastolic CHF (congestive heart failure) ([address] 09/23/2014 9/23/14- echocardiogram revealed moderate LV concentric hypertrophy, stage III diastolic dysfunction, mild tricuspid regurgitation    CKD (chronic kidney disease) stage 4, GFR 15-29 ml/min ([facility])     Diabetic retinopathy ([address]     Glaucoma     Hemodialysis patient ([address]     [facility]  mon wed fri    Hyperkalemia, diminished renal excretion 11/9/2017    Hypertension     Hypoglycemia unawareness in type 1 diabetes mellitus ([address] 11/7/2017    Insulin dependent type 2 diabetes mellitus ([address]     Neuropathy     feet    Osteomyelitis due to secondary diabetes ([address]     rt great toe with amputation    Patient is Nondenominational 11/7/2017    Refusal of blood product     patient states she dose not take blood transfusion    Ventricular hypertrophy     Vitreous hemorrhage ([address]     left eye       Past Surgical History:   Procedure Laterality Date    AMPUTATION      right great toe    ANKLE SURGERY      correction on charcot joint of right ankle    CATARACT REMOVAL      bilateral    CHOLECYSTECTOMY      CYSTOSCOPY      DIALYSIS FISTULA CREATION Left 01/31/2018    upper arm/Dr. Juan Velasquez    ECHO COMPL W DOP COLOR FLOW  2/14/2013         ECHO COMPLETE  9/17/2013         MASTECTOMY      right    OTHER SURGICAL HISTORY  9/27/2011    PPV, membranectomy, laser Right eye    OTHER SURGICAL HISTORY  insertion lumbar drain insertion    10/12/`14    OTHER SURGICAL HISTORY  10/22/15    percutaneous lead placement for spinal cord stimulator    OTHER SURGICAL HISTORY  11/3/15    Spinal; cord stimulator    CO AV ANAST,UP ARM BASILIC VEIN TRANSPOSIT Left 5/15/2018    TRANSPOSITION STAGE II AV FISTULA - LEFT UPPER ARM performed by Annia Siemens, MD at [address] [place] [address] ANGIOACCESS AV FISTULA Left 9/25/2018 in no acute distress, well developed, well nourished and in no acute distress   pleasant and well-hydrated. in no distress and A & O x3  Build:Normal Weight  Function:Rises from a seated position with difficulty - uses walker    HEENT:    Head:normocephalic and atraumatic  Pupils:regular, round and equal.  Sclera: icterus absent   EOM:full and intact. Lungs:    Breathing:Normal expansion. Clear to auscultation. No rales, rhonchi, or wheezing. Abdomen:    Shape:non-distended and normal  Tenderness:none  Guarding:none    Cervical spine:    Inspection:normal  Palpation: Negative TTP, negative paraspinal TTP, negative facet loading left and right  Range of motion:abnormal mildly flexion, extension rotation bilateral and is not painful. Lumbar spine:    Spine inspection:surgical incision scar   CVA tenderness:No   Palpation: +midline TTP, + paraspinal TTP, negative facet loading left and right  Range of motion:abnormal mildly flexion, extension rotation bilateral and is mildly painful. Musculoskeletal:    SI joint tenderness:negative right, negative left  Piriformis tenderness:negative right, negative left  Trochanteric bursa tenderness:negative right, negative left  SLR:negative right, negative left, sitting     Extremities:    Tremors:None bilaterally upper and lower  Range of motion:Generally limited extension shoulder right side, pain with internal rotation of hips negative.   Intact:Yes  Varicose veins:absent   Pulses:radial pulse 2+ left  Cyanosis:none  Edema:Normal    Neurological:    Cranial nerves:normal  Sensory:normal to joint position sense and light touch   Motor:   Right Quadriceps5/5          Left Quadriceps5/5           Right Gastrocnemius5/5    Left Gastrocnemius5/5  Right Ant Tibialis5/5  Left Ant Tibialis5/5  Coordination:normal  Gait:normal    Dermatology:    Skin:no unusual rashes, no skin lesions, no palpable subcutaneous nodules and good skin turgor    Assessment/Plan:    LBP, b/l LE pain, prior PLIF L3-4, L4-5 on 09/25/14  with Dr. Mary Fabian, s/p [employer] SCS implant 2015 but does not feel it is working properly at this time  Left hand pain due to complications related to her AV fistula (CKD on dialysis),  had arterial banding surgery and is noticing some improvement in symptoms from the surgery and the addition of gabapentin  Right trapezius/shoulder girdle pain  Hx right breast CA s/p mastectomy, chemo, and radiation, with typical post-radiation skin changes and controlled lymphedema RUE  + DM, controlled (6/2018 EpC5g=1.2), CHF, HTN     She has been in contact wit the [employer] rep about a battery change since her fistula issues are resolved. Patient reports, however, that her SCS was not helping prior to issues with her battery. States that she will call Dr. Jean Baptiste Friends office as she is interested in having her SCS removed.        OARRS report reviewed 06/2019  Previously discontinued morphine given the risk of build-up of its toxic metabolites in renal failure (she takes in addition to [address] if she has severe pain)  Previously d/c'ed Norco   Continue percocet 5/325 QD prn #30 - ordered for 6/13/19 and 7/13/19  Continue gabapentin 100 mg BID HS - ordered for today with with 1 refill. She is doing well at this dose without fatigue and drowsiness. Patient is s/p right shoulder CSI on 5/15/2019 by Dr. Valdez More with good relief. Therapy was ordered, but patient reports that it is quite difficult to get to therapy as she goes to dialysis 3 times per week and has multiple doctor's appointments. States that she will do HEP or home health. Will message their office. TENS - discontinued due to not helping  Compounding pain cream working very well    Patient encouraged to stay active  Treatment plan discussed with the patient including medication side effects    Will see every 2 months as she is stable and consistent.        Controlled Substance Monitoring:    Acute and Chronic Pain Monitoring:   RX Monitoring 6/6/2019   Attestation -   Periodic Controlled Substance Monitoring No signs of potential drug abuse or diversion identified. ;Possible medication side effects, risk of tolerance/dependence & alternative treatments discussed. ;Assessed functional status. ;Obtaining appropriate analgesic effect of treatment.    Chronic Pain > 80 MEDD -                              ccreferring physic

## 2019-06-06 ENCOUNTER — OFFICE VISIT (OUTPATIENT)
Dept: PAIN MANAGEMENT | Age: 64
End: 2019-06-06
Payer: COMMERCIAL

## 2019-06-06 VITALS
OXYGEN SATURATION: 99 % | DIASTOLIC BLOOD PRESSURE: 68 MMHG | RESPIRATION RATE: 16 BRPM | HEART RATE: 88 BPM | BODY MASS INDEX: 26.48 KG/M2 | SYSTOLIC BLOOD PRESSURE: 102 MMHG | WEIGHT: 185 LBS | HEIGHT: 70 IN | TEMPERATURE: 98.7 F

## 2019-06-06 DIAGNOSIS — M79.10 MYALGIA: ICD-10-CM

## 2019-06-06 DIAGNOSIS — M51.26 HERNIATED LUMBAR INTERVERTEBRAL DISC: ICD-10-CM

## 2019-06-06 DIAGNOSIS — E11.42 DIABETIC PERIPHERAL NEUROPATHY (HCC): ICD-10-CM

## 2019-06-06 DIAGNOSIS — M96.1 LUMBAR POST-LAMINECTOMY SYNDROME: ICD-10-CM

## 2019-06-06 DIAGNOSIS — M54.2 CERVICALGIA: ICD-10-CM

## 2019-06-06 DIAGNOSIS — G89.4 CHRONIC PAIN SYNDROME: ICD-10-CM

## 2019-06-06 DIAGNOSIS — M51.36 LUMBAR DEGENERATIVE DISC DISEASE: Chronic | ICD-10-CM

## 2019-06-06 DIAGNOSIS — M54.16 LUMBAR RADICULOPATHY: ICD-10-CM

## 2019-06-06 PROCEDURE — G8419 CALC BMI OUT NRM PARAM NOF/U: HCPCS | Performed by: PHYSICIAN ASSISTANT

## 2019-06-06 PROCEDURE — 99213 OFFICE O/P EST LOW 20 MIN: CPT | Performed by: PHYSICIAN ASSISTANT

## 2019-06-06 PROCEDURE — 3017F COLORECTAL CA SCREEN DOC REV: CPT | Performed by: PHYSICIAN ASSISTANT

## 2019-06-06 PROCEDURE — 3014F SCREEN MAMMO DOC REV: CPT | Performed by: PHYSICIAN ASSISTANT

## 2019-06-06 PROCEDURE — G8598 ASA/ANTIPLAT THER USED: HCPCS | Performed by: PHYSICIAN ASSISTANT

## 2019-06-06 PROCEDURE — 2022F DILAT RTA XM EVC RTNOPTHY: CPT | Performed by: PHYSICIAN ASSISTANT

## 2019-06-06 PROCEDURE — 3045F PR MOST RECENT HEMOGLOBIN A1C LEVEL 7.0-9.0%: CPT | Performed by: PHYSICIAN ASSISTANT

## 2019-06-06 PROCEDURE — G8427 DOCREV CUR MEDS BY ELIG CLIN: HCPCS | Performed by: PHYSICIAN ASSISTANT

## 2019-06-06 PROCEDURE — 1036F TOBACCO NON-USER: CPT | Performed by: PHYSICIAN ASSISTANT

## 2019-06-06 RX ORDER — OXYCODONE HYDROCHLORIDE AND ACETAMINOPHEN 5; 325 MG/1; MG/1
1 TABLET ORAL DAILY
Qty: 30 TABLET | Refills: 0 | Status: SHIPPED | OUTPATIENT
Start: 2019-06-13 | End: 2019-06-06 | Stop reason: SDUPTHER

## 2019-06-06 RX ORDER — AMLODIPINE BESYLATE 5 MG/1
TABLET ORAL
Refills: 3 | COMMUNITY
Start: 2019-05-25 | End: 2019-07-25

## 2019-06-06 RX ORDER — GABAPENTIN 100 MG/1
200 CAPSULE ORAL EVERY EVENING
Qty: 60 CAPSULE | Refills: 1 | Status: SHIPPED | OUTPATIENT
Start: 2019-06-06 | End: 2019-08-08 | Stop reason: SDUPTHER

## 2019-06-06 RX ORDER — OXYCODONE HYDROCHLORIDE AND ACETAMINOPHEN 5; 325 MG/1; MG/1
1 TABLET ORAL DAILY
Qty: 30 TABLET | Refills: 0 | Status: SHIPPED | OUTPATIENT
Start: 2019-06-13 | End: 2019-08-02 | Stop reason: SDUPTHER

## 2019-06-27 ENCOUNTER — OFFICE VISIT (OUTPATIENT)
Dept: ENT CLINIC | Age: 64
End: 2019-06-27
Payer: COMMERCIAL

## 2019-06-27 VITALS
HEIGHT: 70 IN | OXYGEN SATURATION: 92 % | BODY MASS INDEX: 26.05 KG/M2 | WEIGHT: 182 LBS | HEART RATE: 73 BPM | SYSTOLIC BLOOD PRESSURE: 107 MMHG | DIASTOLIC BLOOD PRESSURE: 60 MMHG

## 2019-06-27 DIAGNOSIS — H91.90 HEARING LOSS, UNSPECIFIED HEARING LOSS TYPE, UNSPECIFIED LATERALITY: Primary | ICD-10-CM

## 2019-06-27 PROCEDURE — G8598 ASA/ANTIPLAT THER USED: HCPCS | Performed by: OTOLARYNGOLOGY

## 2019-06-27 PROCEDURE — 3014F SCREEN MAMMO DOC REV: CPT | Performed by: OTOLARYNGOLOGY

## 2019-06-27 PROCEDURE — 99204 OFFICE O/P NEW MOD 45 MIN: CPT | Performed by: OTOLARYNGOLOGY

## 2019-06-27 PROCEDURE — 3017F COLORECTAL CA SCREEN DOC REV: CPT | Performed by: OTOLARYNGOLOGY

## 2019-06-27 PROCEDURE — 1036F TOBACCO NON-USER: CPT | Performed by: OTOLARYNGOLOGY

## 2019-06-27 PROCEDURE — G8419 CALC BMI OUT NRM PARAM NOF/U: HCPCS | Performed by: OTOLARYNGOLOGY

## 2019-06-27 PROCEDURE — G8427 DOCREV CUR MEDS BY ELIG CLIN: HCPCS | Performed by: OTOLARYNGOLOGY

## 2019-06-27 ASSESSMENT — ENCOUNTER SYMPTOMS
RHINORRHEA: 0
NAUSEA: 0
SHORTNESS OF BREATH: 0
EYE DISCHARGE: 0
EYE PAIN: 0
STRIDOR: 0
VOICE CHANGE: 0
SINUS PRESSURE: 0
ABDOMINAL PAIN: 0

## 2019-06-27 NOTE — PROGRESS NOTES
Subjective:      Patient ID:  Hollie Parker is a 62 y.o. female. HPI:    Hearing Loss  Patient presents today with complaints of hearing loss. Concern regarding hearinghas been present for 10 months. Shehas not failed a prior hearing test.  The patientreports difficulty hearing that is acute in onset and lasts roughly an hour. The hearing recovers to normal level after these episodes. She has had a prior audiogram at an outside facility that showed no hearing loss or middle ear effusion. Patient does not have hearing aids at this time. History of Head trauma: no   Description: none  History of surgeryto the head/neck: no, she has undergone chemo/radiation for breast cancer   Description:none  History of cerumen impaction: no  History of noise exposure: no   Type: none  Spinning: no   Length of time: hours  Hearing loss: yes    Fluctuating: yes  Aural pressure: no  Tinnitus:no  Otalgia:no    Patient'smedications, allergies, past medical, surgical, social and family histories werereviewed and updated as appropriate. Review of Systems   Constitutional: Negative for activity change, fatigue and fever. HENT: Positive for hearing loss. Negative for congestion, ear discharge, ear pain, nosebleeds, postnasal drip, rhinorrhea, sinus pressure and voice change. Eyes: Negative for pain and discharge. Respiratory: Negative for shortness of breath and stridor. Gastrointestinal: Negative for abdominal pain and nausea. Endocrine: Negative for cold intolerance and heat intolerance. Genitourinary: Negative for frequency. Musculoskeletal: Negative for arthralgias and joint swelling. Skin: Negative for rash and wound. Neurological: Negative for speech difficulty, weakness and headaches. Psychiatric/Behavioral: Negative for agitation and behavioral problems. Objective:   Physical Exam   Constitutional: She is oriented to person, place, and time.  She appears well-developed and well-nourished. No distress. HENT:   Head: Normocephalic and atraumatic. Right Ear: Hearing, tympanic membrane, external ear and ear canal normal. No drainage. Left Ear: Hearing, tympanic membrane, external ear and ear canal normal. No drainage. Nose: No mucosal edema, rhinorrhea or nasal deformity. Right sinus exhibits no maxillary sinus tenderness and no frontal sinus tenderness. Left sinus exhibits no maxillary sinus tenderness and no frontal sinus tenderness. Mouth/Throat: Uvula is midline and oropharynx is clear and moist. Normal dentition. Eyes: Conjunctivae and EOM are normal.   Neck: Normal range of motion. Neck supple. No tracheal deviation present. No thyromegaly present. Cardiovascular: Normal rate. Pulmonary/Chest: Effort normal. No stridor. No respiratory distress. She has no wheezes. Abdominal: Soft. She exhibits no distension. Musculoskeletal: Normal range of motion. Neurological: She is alert and oriented to person, place, and time. Skin: Skin is warm and dry. Audiogram -                        Assessment:       Diagnosis Orders   1. Hearing loss, unspecified hearing loss type, unspecified laterality                Plan:      Patient has not experienced any hearing loss for the last month. She can call us for any episodes of hearing loss and she may be seen if an audiologist is available and we can review this with her at another time. Follow up prn with repeated hearing loss    Patient seen, examined, and plan discussed with Dr. Cynda Dandy    Electronically signed by Johnnie Yusuf DO on 6/27/2019 at 2:08 PM            [address] [facility]  1955    I have discussed the case, including pertinent history and exam findings with the resident. I have seen and examined the patient and the key elements of the encounter have been performed by me.  I agree with the assessment, plan and orders as documented by the  resident    Patient is here to establish care as a new patient in the clinic. Remainder of medical problems as per  resident note. Patient seen and examined. Agree with above exam, assessment and plan.       Electronically signed by Lidia Mancini DO on 6/27/19 at 5:02 PM

## 2019-07-25 ENCOUNTER — OFFICE VISIT (OUTPATIENT)
Dept: FAMILY MEDICINE CLINIC | Age: 64
End: 2019-07-25
Payer: COMMERCIAL

## 2019-07-25 VITALS
DIASTOLIC BLOOD PRESSURE: 50 MMHG | RESPIRATION RATE: 16 BRPM | HEIGHT: 70 IN | BODY MASS INDEX: 28.92 KG/M2 | SYSTOLIC BLOOD PRESSURE: 87 MMHG | WEIGHT: 202 LBS | HEART RATE: 74 BPM

## 2019-07-25 DIAGNOSIS — Z79.4 INSULIN DEPENDENT TYPE 2 DIABETES MELLITUS (HCC): Primary | ICD-10-CM

## 2019-07-25 DIAGNOSIS — E11.9 INSULIN DEPENDENT TYPE 2 DIABETES MELLITUS (HCC): Primary | ICD-10-CM

## 2019-07-25 DIAGNOSIS — T30.0 SKIN BURN: ICD-10-CM

## 2019-07-25 DIAGNOSIS — I10 HYPERTENSION, UNSPECIFIED TYPE: ICD-10-CM

## 2019-07-25 DIAGNOSIS — G56.03 BILATERAL CARPAL TUNNEL SYNDROME: ICD-10-CM

## 2019-07-25 DIAGNOSIS — H21.301: ICD-10-CM

## 2019-07-25 LAB — HBA1C MFR BLD: 7.1 %

## 2019-07-25 PROCEDURE — 2022F DILAT RTA XM EVC RTNOPTHY: CPT | Performed by: FAMILY MEDICINE

## 2019-07-25 PROCEDURE — 3045F PR MOST RECENT HEMOGLOBIN A1C LEVEL 7.0-9.0%: CPT | Performed by: FAMILY MEDICINE

## 2019-07-25 PROCEDURE — 99213 OFFICE O/P EST LOW 20 MIN: CPT | Performed by: FAMILY MEDICINE

## 2019-07-25 PROCEDURE — 1036F TOBACCO NON-USER: CPT | Performed by: FAMILY MEDICINE

## 2019-07-25 PROCEDURE — 83036 HEMOGLOBIN GLYCOSYLATED A1C: CPT | Performed by: FAMILY MEDICINE

## 2019-07-25 PROCEDURE — G8419 CALC BMI OUT NRM PARAM NOF/U: HCPCS | Performed by: FAMILY MEDICINE

## 2019-07-25 PROCEDURE — G8598 ASA/ANTIPLAT THER USED: HCPCS | Performed by: FAMILY MEDICINE

## 2019-07-25 PROCEDURE — 3014F SCREEN MAMMO DOC REV: CPT | Performed by: FAMILY MEDICINE

## 2019-07-25 PROCEDURE — G8427 DOCREV CUR MEDS BY ELIG CLIN: HCPCS | Performed by: FAMILY MEDICINE

## 2019-07-25 PROCEDURE — 3017F COLORECTAL CA SCREEN DOC REV: CPT | Performed by: FAMILY MEDICINE

## 2019-07-25 RX ORDER — AMLODIPINE BESYLATE 2.5 MG/1
2.5 TABLET ORAL DAILY
Qty: 30 TABLET | Refills: 0 | Status: SHIPPED | OUTPATIENT
Start: 2019-07-25 | End: 2019-11-25

## 2019-07-25 RX ORDER — FLASH GLUCOSE SENSOR
1 KIT MISCELLANEOUS 4 TIMES DAILY
Qty: 1 EACH | Refills: 5 | Status: SHIPPED | OUTPATIENT
Start: 2019-07-25 | End: 2019-08-08

## 2019-07-25 NOTE — PROGRESS NOTES
Progress Note    Chief complaint:     HPI    HTN--weaned from 10 mg Norvasc to 5 mg. Pressure remains low. She is asymptomatic. Diabetes--purchased a philip. Would like materials for the new machine. BGs have been running mostly under control. Sees Dr Linda Amin in September for her eyes. Has a cyst near her right eye which is bothering her. Bilateral carpel tunnel--wearing splints at night and this has helped. Still significant discomfort in both her hands. Will be seeing Dr Chilango Akbar soon for her right shoulder. The injection helped a lot last time. Skin burning--unknown etiology. Is a dialysis patient. Tried Sarna cream which I prescribed without any improvement. Has basically tried everything OTC she states. Changed water systems again w/o improvement. Burns only after dialysis. Tried Benadryl during dialysis and it doesn't help. Patient's past medical, surgical, social and/or family history reviewed, updated in chart, and are non-contributory (unless otherwise stated). Medications and allergies also reviewed and updated in chart. Past Medical History:   Diagnosis Date    Acute infection of bone ([address]     infection of rt foot, resolved.     Anemia of chronic disease     Breast cancer ([address]     right breast, 2008/ bladder, 2006    CAD (coronary artery disease)     Chronic diastolic CHF (congestive heart failure) ([address] 09/23/2014 9/23/14- echocardiogram revealed moderate LV concentric hypertrophy, stage III diastolic dysfunction, mild tricuspid regurgitation    CKD (chronic kidney disease) stage 4, GFR 15-29 ml/min ([facility])     Diabetic retinopathy ([address]     Glaucoma     Hemodialysis patient ([address]     [facility]  mon wed fri    Hyperkalemia, diminished renal excretion 11/9/2017    Hypertension     Hypoglycemia unawareness in type 1 diabetes mellitus ([address] 11/7/2017    Insulin dependent type 2 diabetes mellitus ([facility])     Neuropathy     feet    Osteomyelitis due to secondary diabetes

## 2019-07-31 ENCOUNTER — OFFICE VISIT (OUTPATIENT)
Dept: ORTHOPEDIC SURGERY | Age: 64
End: 2019-07-31
Payer: COMMERCIAL

## 2019-07-31 VITALS
HEIGHT: 70 IN | WEIGHT: 192 LBS | BODY MASS INDEX: 27.49 KG/M2 | DIASTOLIC BLOOD PRESSURE: 62 MMHG | HEART RATE: 73 BPM | SYSTOLIC BLOOD PRESSURE: 110 MMHG

## 2019-07-31 DIAGNOSIS — M25.511 CHRONIC RIGHT SHOULDER PAIN: ICD-10-CM

## 2019-07-31 DIAGNOSIS — M19.011 OSTEOARTHRITIS OF RIGHT SHOULDER, UNSPECIFIED OSTEOARTHRITIS TYPE: Primary | ICD-10-CM

## 2019-07-31 DIAGNOSIS — G89.29 CHRONIC RIGHT SHOULDER PAIN: ICD-10-CM

## 2019-07-31 PROCEDURE — G8427 DOCREV CUR MEDS BY ELIG CLIN: HCPCS | Performed by: ORTHOPAEDIC SURGERY

## 2019-07-31 PROCEDURE — 99213 OFFICE O/P EST LOW 20 MIN: CPT | Performed by: ORTHOPAEDIC SURGERY

## 2019-07-31 PROCEDURE — G8419 CALC BMI OUT NRM PARAM NOF/U: HCPCS | Performed by: ORTHOPAEDIC SURGERY

## 2019-07-31 PROCEDURE — 3014F SCREEN MAMMO DOC REV: CPT | Performed by: ORTHOPAEDIC SURGERY

## 2019-07-31 PROCEDURE — G8598 ASA/ANTIPLAT THER USED: HCPCS | Performed by: ORTHOPAEDIC SURGERY

## 2019-07-31 PROCEDURE — 1036F TOBACCO NON-USER: CPT | Performed by: ORTHOPAEDIC SURGERY

## 2019-07-31 PROCEDURE — 3017F COLORECTAL CA SCREEN DOC REV: CPT | Performed by: ORTHOPAEDIC SURGERY

## 2019-08-01 ENCOUNTER — TELEPHONE (OUTPATIENT)
Dept: PAIN MANAGEMENT | Age: 64
End: 2019-08-01

## 2019-08-01 DIAGNOSIS — G89.4 CHRONIC PAIN SYNDROME: ICD-10-CM

## 2019-08-01 DIAGNOSIS — M96.1 LUMBAR POST-LAMINECTOMY SYNDROME: ICD-10-CM

## 2019-08-01 DIAGNOSIS — M51.36 LUMBAR DEGENERATIVE DISC DISEASE: Chronic | ICD-10-CM

## 2019-08-01 DIAGNOSIS — M51.26 HERNIATED LUMBAR INTERVERTEBRAL DISC: ICD-10-CM

## 2019-08-01 DIAGNOSIS — M54.16 LUMBAR RADICULOPATHY: ICD-10-CM

## 2019-08-01 DIAGNOSIS — E11.42 DIABETIC PERIPHERAL NEUROPATHY (HCC): ICD-10-CM

## 2019-08-02 RX ORDER — OXYCODONE HYDROCHLORIDE AND ACETAMINOPHEN 5; 325 MG/1; MG/1
1 TABLET ORAL DAILY
Qty: 6 TABLET | Refills: 0 | Status: SHIPPED | OUTPATIENT
Start: 2019-08-02 | End: 2019-08-08 | Stop reason: SDUPTHER

## 2019-08-06 RX ORDER — ISOSORBIDE MONONITRATE 60 MG/1
TABLET, EXTENDED RELEASE ORAL
Qty: 60 TABLET | Refills: 5 | Status: SHIPPED | OUTPATIENT
Start: 2019-08-06 | End: 2019-11-25 | Stop reason: SDUPTHER

## 2019-08-08 ENCOUNTER — OFFICE VISIT (OUTPATIENT)
Dept: PAIN MANAGEMENT | Age: 64
End: 2019-08-08
Payer: COMMERCIAL

## 2019-08-08 VITALS
HEART RATE: 78 BPM | RESPIRATION RATE: 16 BRPM | HEIGHT: 70 IN | BODY MASS INDEX: 26.63 KG/M2 | WEIGHT: 186 LBS | TEMPERATURE: 97.6 F | OXYGEN SATURATION: 99 % | DIASTOLIC BLOOD PRESSURE: 64 MMHG | SYSTOLIC BLOOD PRESSURE: 118 MMHG

## 2019-08-08 DIAGNOSIS — M51.36 LUMBAR DEGENERATIVE DISC DISEASE: Chronic | ICD-10-CM

## 2019-08-08 DIAGNOSIS — M96.1 LUMBAR POST-LAMINECTOMY SYNDROME: ICD-10-CM

## 2019-08-08 DIAGNOSIS — G89.4 CHRONIC PAIN SYNDROME: ICD-10-CM

## 2019-08-08 DIAGNOSIS — M54.16 LUMBAR RADICULOPATHY: ICD-10-CM

## 2019-08-08 DIAGNOSIS — M79.10 MYALGIA: ICD-10-CM

## 2019-08-08 DIAGNOSIS — E11.42 DIABETIC PERIPHERAL NEUROPATHY (HCC): ICD-10-CM

## 2019-08-08 DIAGNOSIS — M54.2 CERVICALGIA: ICD-10-CM

## 2019-08-08 DIAGNOSIS — M51.26 HERNIATED LUMBAR INTERVERTEBRAL DISC: ICD-10-CM

## 2019-08-08 PROCEDURE — 2022F DILAT RTA XM EVC RTNOPTHY: CPT | Performed by: PHYSICIAN ASSISTANT

## 2019-08-08 PROCEDURE — 1036F TOBACCO NON-USER: CPT | Performed by: PHYSICIAN ASSISTANT

## 2019-08-08 PROCEDURE — 3017F COLORECTAL CA SCREEN DOC REV: CPT | Performed by: PHYSICIAN ASSISTANT

## 2019-08-08 PROCEDURE — G8419 CALC BMI OUT NRM PARAM NOF/U: HCPCS | Performed by: PHYSICIAN ASSISTANT

## 2019-08-08 PROCEDURE — G8598 ASA/ANTIPLAT THER USED: HCPCS | Performed by: PHYSICIAN ASSISTANT

## 2019-08-08 PROCEDURE — 99213 OFFICE O/P EST LOW 20 MIN: CPT | Performed by: PHYSICIAN ASSISTANT

## 2019-08-08 PROCEDURE — G8427 DOCREV CUR MEDS BY ELIG CLIN: HCPCS | Performed by: PHYSICIAN ASSISTANT

## 2019-08-08 PROCEDURE — 3014F SCREEN MAMMO DOC REV: CPT | Performed by: PHYSICIAN ASSISTANT

## 2019-08-08 PROCEDURE — 3045F PR MOST RECENT HEMOGLOBIN A1C LEVEL 7.0-9.0%: CPT | Performed by: PHYSICIAN ASSISTANT

## 2019-08-08 RX ORDER — OXYCODONE HYDROCHLORIDE AND ACETAMINOPHEN 5; 325 MG/1; MG/1
1 TABLET ORAL DAILY
Qty: 6 TABLET | Refills: 0 | Status: SHIPPED | OUTPATIENT
Start: 2019-09-07 | End: 2019-08-20 | Stop reason: SDUPTHER

## 2019-08-08 RX ORDER — BRIMONIDINE TARTRATE/TIMOLOL 0.2%-0.5%
1 DROPS OPHTHALMIC (EYE) EVERY 12 HOURS
Refills: 7 | Status: ON HOLD | COMMUNITY
Start: 2019-08-01 | End: 2022-07-21 | Stop reason: HOSPADM

## 2019-08-08 RX ORDER — OXYCODONE HYDROCHLORIDE AND ACETAMINOPHEN 5; 325 MG/1; MG/1
1 TABLET ORAL DAILY
Qty: 6 TABLET | Refills: 0 | Status: SHIPPED | OUTPATIENT
Start: 2019-08-08 | End: 2019-08-08 | Stop reason: SDUPTHER

## 2019-08-08 RX ORDER — GABAPENTIN 100 MG/1
200 CAPSULE ORAL EVERY EVENING
Qty: 60 CAPSULE | Refills: 1 | Status: SHIPPED | OUTPATIENT
Start: 2019-08-08 | End: 2019-10-08 | Stop reason: SDUPTHER

## 2019-08-08 NOTE — PROGRESS NOTES
predominant in the level of   L4-L5 bilaterally.      2015 EMG/NCT:     IMPRESSION:  Electrodiagnostic examination of both arms and both legs disclosed evidence  diagnostic of the followin. 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 Such findings are seen in disorders  as diabetes mellitus, hypothyroidism, nutritional deficiencies and toxic  Neuropathies.     2. Left median neuropathy at/or distal to the wrist of minimal severity. These findings were consistent with carpal tunnel syndrome. This disorder  is more commonly seen in underlying peripheral neuropathic disease.     3. Acute and chronic denervation changes in the proximal muscles of the  right leg suggesting proximal motor neuropathy as in diabetic amyotrophy  versus possible L3 and L4 motor radiculopathies. These motor radiculopathies  could not be proven due to difficulty performing needle examination of the  lumbar paraspinal muscles.     There were no other peripheral neuropathies. There were no other motor  radiculopathies. Sensory radiculopathies cannot be evaluated by  electrodiagnostic means. The patient's sudden onset of difficulties in her right thigh was clinically  consistent with diabetic amyotrophy. However, MR imaging  studies of the lumbosacral spine were recommended to rule out compressive  radicular disease in the upper lumbar nerve roots. Clinical correlation was  highly advised.                                         Potential Aberrant Drug-Related Behavior:  no     Urine Drug Screenin2018 consistent  2019 consistent     OARRS report:  2018 consistent  2018 consistent  10/18/2018 consistent  2018 consistent  1/3/2019 consistent  2019 consistent  2019 consistent  2019 consistent  2019 consistent  2019 consistent       Past Medical History:   Diagnosis Date    Acute infection of bone ([address]     infection of rt 6/6/19 7/6/19  BRIAN Wright       Allergies   Allergen Reactions    Lasix [Furosemide] Other (See Comments)     States her kidneys shut down       Social History     Socioeconomic History    Marital status: Single     Spouse name: Not on file    Number of children: Not on file    Years of education: Not on file    Highest education level: Not on file   Occupational History    Not on file   Social Needs    Financial resource strain: Not on file    Food insecurity:     Worry: Not on file     Inability: Not on file    Transportation needs:     Medical: Not on file     Non-medical: Not on file   Tobacco Use    Smoking status: Never Smoker    Smokeless tobacco: Never Used   Substance and Sexual Activity    Alcohol use: No    Drug use: No    Sexual activity: Never   Lifestyle    Physical activity:     Days per week: Not on file     Minutes per session: Not on file    Stress: Not on file   Relationships    Social connections:     Talks on phone: Not on file     Gets together: Not on file     Attends Muslim service: Not on file     Active member of club or organization: Not on file     Attends meetings of clubs or organizations: Not on file     Relationship status: Not on file    Intimate partner violence:     Fear of current or ex partner: Not on file     Emotionally abused: Not on file     Physically abused: Not on file     Forced sexual activity: Not on file   Other Topics Concern    Not on file   Social History Narrative    Not on file       Family History   Problem Relation Age of Onset    Breast Cancer Mother 61    Hypertension Mother     Heart Disease Father     Prostate Cancer Father     Breast Cancer Maternal Grandmother 61       REVIEW OF SYSTEMS:     Phyliss Glassing denies fever/chills, chest pain, shortness of breath, new bowel or bladder complaints or suicidal ideations. All other review of systems was negative.     ROS    PHYSICAL EXAMINATION:      /64   Pulse 78   Temp

## 2019-08-16 DIAGNOSIS — I25.10 ATHEROSCLEROSIS OF NATIVE CORONARY ARTERY OF NATIVE HEART WITHOUT ANGINA PECTORIS: ICD-10-CM

## 2019-08-16 DIAGNOSIS — E11.22 TYPE 2 DIABETES MELLITUS WITH STAGE 4 CHRONIC KIDNEY DISEASE, UNSPECIFIED WHETHER LONG TERM INSULIN USE (HCC): ICD-10-CM

## 2019-08-16 DIAGNOSIS — N18.4 TYPE 2 DIABETES MELLITUS WITH STAGE 4 CHRONIC KIDNEY DISEASE, UNSPECIFIED WHETHER LONG TERM INSULIN USE (HCC): ICD-10-CM

## 2019-08-16 RX ORDER — ATORVASTATIN CALCIUM 40 MG/1
TABLET, FILM COATED ORAL
Qty: 30 TABLET | Refills: 5 | Status: SHIPPED | OUTPATIENT
Start: 2019-08-16 | End: 2020-01-29

## 2019-08-16 NOTE — TELEPHONE ENCOUNTER
Medication approved. Needs lipid panel drawn. Order is put back in (was initially ordered in November). Can get done with any upcoming blood work but needs to be fasting 8 hours.      Thank you,  Electronically signed by Finn Duncan MD on 8/16/2019 at 9:27 AM

## 2019-08-20 ENCOUNTER — TELEPHONE (OUTPATIENT)
Dept: PAIN MANAGEMENT | Age: 64
End: 2019-08-20

## 2019-08-20 ENCOUNTER — HOSPITAL ENCOUNTER (OUTPATIENT)
Age: 64
Discharge: HOME | End: 2019-08-20
Payer: COMMERCIAL

## 2019-08-20 DIAGNOSIS — G89.4 CHRONIC PAIN SYNDROME: ICD-10-CM

## 2019-08-20 DIAGNOSIS — M51.36 LUMBAR DEGENERATIVE DISC DISEASE: Chronic | ICD-10-CM

## 2019-08-20 DIAGNOSIS — E11.42 DIABETIC PERIPHERAL NEUROPATHY (HCC): ICD-10-CM

## 2019-08-20 DIAGNOSIS — M96.1 LUMBAR POST-LAMINECTOMY SYNDROME: ICD-10-CM

## 2019-08-20 DIAGNOSIS — M51.26 HERNIATED LUMBAR INTERVERTEBRAL DISC: ICD-10-CM

## 2019-08-20 DIAGNOSIS — I25.10 ATHEROSCLEROSIS OF NATIVE CORONARY ARTERY OF NATIVE HEART WITHOUT ANGINA PECTORIS: ICD-10-CM

## 2019-08-20 DIAGNOSIS — M54.16 LUMBAR RADICULOPATHY: ICD-10-CM

## 2019-08-20 LAB
CHOLESTEROL, TOTAL: 122 MG/DL (ref 0–199)
HDLC SERPL-MCNC: 40 MG/DL
LDL CHOLESTEROL CALCULATED: 62 MG/DL (ref 0–99)
TRIGL SERPL-MCNC: 99 MG/DL (ref 0–149)
VLDLC SERPL CALC-MCNC: 20 MG/DL

## 2019-08-20 PROCEDURE — 80061 LIPID PANEL: CPT

## 2019-08-20 PROCEDURE — 36415 COLL VENOUS BLD VENIPUNCTURE: CPT

## 2019-08-20 RX ORDER — OXYCODONE HYDROCHLORIDE AND ACETAMINOPHEN 5; 325 MG/1; MG/1
1 TABLET ORAL DAILY
Qty: 30 TABLET | Refills: 0 | Status: SHIPPED | OUTPATIENT
Start: 2019-09-07 | End: 2019-10-08 | Stop reason: SDUPTHER

## 2019-08-23 ENCOUNTER — TELEPHONE (OUTPATIENT)
Dept: PAIN MANAGEMENT | Age: 64
End: 2019-08-23

## 2019-08-23 NOTE — TELEPHONE ENCOUNTER
[facility] from [facility] called the rx for her Percocet  The start date is 9-7-2019 she stated she is out of the meds can they fill the rx now

## 2019-09-30 RX ORDER — BUMETANIDE 2 MG/1
2 TABLET ORAL
Qty: 38 TABLET | Refills: 4 | Status: SHIPPED
Start: 2019-09-30 | End: 2020-12-17

## 2019-10-08 ENCOUNTER — OFFICE VISIT (OUTPATIENT)
Dept: PAIN MANAGEMENT | Age: 64
End: 2019-10-08
Payer: COMMERCIAL

## 2019-10-08 VITALS
RESPIRATION RATE: 16 BRPM | SYSTOLIC BLOOD PRESSURE: 138 MMHG | DIASTOLIC BLOOD PRESSURE: 74 MMHG | BODY MASS INDEX: 26.63 KG/M2 | WEIGHT: 186 LBS | TEMPERATURE: 97.6 F | HEART RATE: 84 BPM | HEIGHT: 70 IN | OXYGEN SATURATION: 98 %

## 2019-10-08 DIAGNOSIS — M79.10 MYALGIA: ICD-10-CM

## 2019-10-08 DIAGNOSIS — M96.1 LUMBAR POST-LAMINECTOMY SYNDROME: ICD-10-CM

## 2019-10-08 DIAGNOSIS — M51.26 HERNIATED LUMBAR INTERVERTEBRAL DISC: ICD-10-CM

## 2019-10-08 DIAGNOSIS — E11.42 DIABETIC PERIPHERAL NEUROPATHY (HCC): ICD-10-CM

## 2019-10-08 DIAGNOSIS — M51.36 LUMBAR DEGENERATIVE DISC DISEASE: Chronic | ICD-10-CM

## 2019-10-08 DIAGNOSIS — M54.16 LUMBAR RADICULOPATHY: ICD-10-CM

## 2019-10-08 DIAGNOSIS — G89.4 CHRONIC PAIN SYNDROME: ICD-10-CM

## 2019-10-08 DIAGNOSIS — M54.2 CERVICALGIA: ICD-10-CM

## 2019-10-08 PROCEDURE — 1036F TOBACCO NON-USER: CPT | Performed by: PHYSICIAN ASSISTANT

## 2019-10-08 PROCEDURE — 99213 OFFICE O/P EST LOW 20 MIN: CPT | Performed by: PHYSICIAN ASSISTANT

## 2019-10-08 PROCEDURE — 3045F PR MOST RECENT HEMOGLOBIN A1C LEVEL 7.0-9.0%: CPT | Performed by: PHYSICIAN ASSISTANT

## 2019-10-08 PROCEDURE — 2022F DILAT RTA XM EVC RTNOPTHY: CPT | Performed by: PHYSICIAN ASSISTANT

## 2019-10-08 PROCEDURE — G8419 CALC BMI OUT NRM PARAM NOF/U: HCPCS | Performed by: PHYSICIAN ASSISTANT

## 2019-10-08 PROCEDURE — G8427 DOCREV CUR MEDS BY ELIG CLIN: HCPCS | Performed by: PHYSICIAN ASSISTANT

## 2019-10-08 PROCEDURE — 3017F COLORECTAL CA SCREEN DOC REV: CPT | Performed by: PHYSICIAN ASSISTANT

## 2019-10-08 PROCEDURE — G8598 ASA/ANTIPLAT THER USED: HCPCS | Performed by: PHYSICIAN ASSISTANT

## 2019-10-08 PROCEDURE — G8484 FLU IMMUNIZE NO ADMIN: HCPCS | Performed by: PHYSICIAN ASSISTANT

## 2019-10-08 PROCEDURE — 3014F SCREEN MAMMO DOC REV: CPT | Performed by: PHYSICIAN ASSISTANT

## 2019-10-08 RX ORDER — GABAPENTIN 100 MG/1
200 CAPSULE ORAL EVERY EVENING
Qty: 60 CAPSULE | Refills: 2 | Status: SHIPPED
Start: 2019-10-08 | End: 2020-02-06 | Stop reason: SDUPTHER

## 2019-10-08 RX ORDER — OXYCODONE HYDROCHLORIDE AND ACETAMINOPHEN 5; 325 MG/1; MG/1
1 TABLET ORAL DAILY
Qty: 30 TABLET | Refills: 0 | Status: SHIPPED | OUTPATIENT
Start: 2019-10-08 | End: 2019-10-08 | Stop reason: SDUPTHER

## 2019-10-08 RX ORDER — OXYCODONE HYDROCHLORIDE AND ACETAMINOPHEN 5; 325 MG/1; MG/1
1 TABLET ORAL DAILY
Qty: 30 TABLET | Refills: 0 | Status: SHIPPED | OUTPATIENT
Start: 2019-11-07 | End: 2019-12-05 | Stop reason: SDUPTHER

## 2019-10-09 RX ORDER — OMEPRAZOLE 20 MG/1
CAPSULE, DELAYED RELEASE ORAL
Qty: 30 CAPSULE | Refills: 4 | Status: SHIPPED
Start: 2019-10-09 | End: 2020-02-24

## 2019-11-04 ENCOUNTER — OFFICE VISIT (OUTPATIENT)
Dept: ORTHOPEDIC SURGERY | Age: 64
End: 2019-11-04
Payer: COMMERCIAL

## 2019-11-04 VITALS — WEIGHT: 196 LBS | BODY MASS INDEX: 28.06 KG/M2 | HEIGHT: 70 IN

## 2019-11-04 DIAGNOSIS — M19.011 OSTEOARTHRITIS OF RIGHT SHOULDER, UNSPECIFIED OSTEOARTHRITIS TYPE: Primary | ICD-10-CM

## 2019-11-04 DIAGNOSIS — G89.29 CHRONIC RIGHT SHOULDER PAIN: ICD-10-CM

## 2019-11-04 DIAGNOSIS — M25.511 CHRONIC RIGHT SHOULDER PAIN: ICD-10-CM

## 2019-11-04 DIAGNOSIS — G56.03 BILATERAL CARPAL TUNNEL SYNDROME: ICD-10-CM

## 2019-11-04 PROCEDURE — G8484 FLU IMMUNIZE NO ADMIN: HCPCS | Performed by: ORTHOPAEDIC SURGERY

## 2019-11-04 PROCEDURE — 3017F COLORECTAL CA SCREEN DOC REV: CPT | Performed by: ORTHOPAEDIC SURGERY

## 2019-11-04 PROCEDURE — 1036F TOBACCO NON-USER: CPT | Performed by: ORTHOPAEDIC SURGERY

## 2019-11-04 PROCEDURE — G9899 SCRN MAM PERF RSLTS DOC: HCPCS | Performed by: ORTHOPAEDIC SURGERY

## 2019-11-04 PROCEDURE — 99213 OFFICE O/P EST LOW 20 MIN: CPT | Performed by: ORTHOPAEDIC SURGERY

## 2019-11-04 PROCEDURE — G8427 DOCREV CUR MEDS BY ELIG CLIN: HCPCS | Performed by: ORTHOPAEDIC SURGERY

## 2019-11-04 PROCEDURE — G8419 CALC BMI OUT NRM PARAM NOF/U: HCPCS | Performed by: ORTHOPAEDIC SURGERY

## 2019-11-04 PROCEDURE — G8598 ASA/ANTIPLAT THER USED: HCPCS | Performed by: ORTHOPAEDIC SURGERY

## 2019-11-04 PROCEDURE — 20610 DRAIN/INJ JOINT/BURSA W/O US: CPT | Performed by: ORTHOPAEDIC SURGERY

## 2019-11-04 RX ORDER — LIDOCAINE HYDROCHLORIDE 10 MG/ML
4 INJECTION, SOLUTION INFILTRATION; PERINEURAL ONCE
Status: COMPLETED | OUTPATIENT
Start: 2019-11-04 | End: 2019-11-04

## 2019-11-04 RX ORDER — TRIAMCINOLONE ACETONIDE 40 MG/ML
40 INJECTION, SUSPENSION INTRA-ARTICULAR; INTRAMUSCULAR ONCE
Status: COMPLETED | OUTPATIENT
Start: 2019-11-04 | End: 2019-11-04

## 2019-11-04 RX ORDER — LIDOCAINE AND PRILOCAINE 25; 25 MG/G; MG/G
CREAM TOPICAL
Refills: 6 | COMMUNITY
Start: 2019-10-25 | End: 2021-05-19

## 2019-11-04 RX ADMIN — TRIAMCINOLONE ACETONIDE 40 MG: 40 INJECTION, SUSPENSION INTRA-ARTICULAR; INTRAMUSCULAR at 15:55

## 2019-11-04 RX ADMIN — LIDOCAINE HYDROCHLORIDE 4 ML: 10 INJECTION, SOLUTION INFILTRATION; PERINEURAL at 15:55

## 2019-11-06 ENCOUNTER — TELEPHONE (OUTPATIENT)
Dept: ORTHOPEDIC SURGERY | Age: 64
End: 2019-11-06

## 2019-11-08 DIAGNOSIS — N18.4 TYPE 2 DIABETES MELLITUS WITH STAGE 4 CHRONIC KIDNEY DISEASE, UNSPECIFIED WHETHER LONG TERM INSULIN USE (HCC): Primary | ICD-10-CM

## 2019-11-08 DIAGNOSIS — E11.22 TYPE 2 DIABETES MELLITUS WITH STAGE 4 CHRONIC KIDNEY DISEASE, UNSPECIFIED WHETHER LONG TERM INSULIN USE (HCC): Primary | ICD-10-CM

## 2019-11-08 DIAGNOSIS — Z76.0 MEDICATION REFILL: ICD-10-CM

## 2019-11-08 RX ORDER — ALLOPURINOL 100 MG/1
TABLET ORAL
Qty: 30 TABLET | Refills: 4 | Status: SHIPPED
Start: 2019-11-08 | End: 2020-03-23

## 2019-11-25 ENCOUNTER — OFFICE VISIT (OUTPATIENT)
Dept: FAMILY MEDICINE CLINIC | Age: 64
End: 2019-11-25
Payer: COMMERCIAL

## 2019-11-25 VITALS
BODY MASS INDEX: 29.06 KG/M2 | HEART RATE: 80 BPM | WEIGHT: 203 LBS | HEIGHT: 70 IN | RESPIRATION RATE: 16 BRPM | SYSTOLIC BLOOD PRESSURE: 106 MMHG | DIASTOLIC BLOOD PRESSURE: 58 MMHG

## 2019-11-25 DIAGNOSIS — N18.6 CONTROLLED TYPE 2 DIABETES MELLITUS WITH CHRONIC KIDNEY DISEASE ON CHRONIC DIALYSIS, WITH LONG-TERM CURRENT USE OF INSULIN (HCC): Primary | ICD-10-CM

## 2019-11-25 DIAGNOSIS — N18.6 ESRD (END STAGE RENAL DISEASE) (HCC): ICD-10-CM

## 2019-11-25 DIAGNOSIS — Z99.2 CONTROLLED TYPE 2 DIABETES MELLITUS WITH CHRONIC KIDNEY DISEASE ON CHRONIC DIALYSIS, WITH LONG-TERM CURRENT USE OF INSULIN (HCC): Primary | ICD-10-CM

## 2019-11-25 DIAGNOSIS — I95.2 HYPOTENSION DUE TO DRUGS: ICD-10-CM

## 2019-11-25 DIAGNOSIS — Z79.4 CONTROLLED TYPE 2 DIABETES MELLITUS WITH CHRONIC KIDNEY DISEASE ON CHRONIC DIALYSIS, WITH LONG-TERM CURRENT USE OF INSULIN (HCC): Primary | ICD-10-CM

## 2019-11-25 DIAGNOSIS — E11.22 CONTROLLED TYPE 2 DIABETES MELLITUS WITH CHRONIC KIDNEY DISEASE ON CHRONIC DIALYSIS, WITH LONG-TERM CURRENT USE OF INSULIN (HCC): Primary | ICD-10-CM

## 2019-11-25 DIAGNOSIS — L29.9 PRURITUS: ICD-10-CM

## 2019-11-25 DIAGNOSIS — G56.03 BILATERAL CARPAL TUNNEL SYNDROME: ICD-10-CM

## 2019-11-25 PROBLEM — I50.33 ACUTE ON CHRONIC DIASTOLIC CHF (CONGESTIVE HEART FAILURE) (HCC): Status: RESOLVED | Noted: 2017-02-16 | Resolved: 2019-11-25

## 2019-11-25 LAB — HBA1C MFR BLD: 6.9 %

## 2019-11-25 PROCEDURE — 99214 OFFICE O/P EST MOD 30 MIN: CPT | Performed by: FAMILY MEDICINE

## 2019-11-25 PROCEDURE — 3044F HG A1C LEVEL LT 7.0%: CPT | Performed by: FAMILY MEDICINE

## 2019-11-25 PROCEDURE — G8598 ASA/ANTIPLAT THER USED: HCPCS | Performed by: FAMILY MEDICINE

## 2019-11-25 PROCEDURE — G8419 CALC BMI OUT NRM PARAM NOF/U: HCPCS | Performed by: FAMILY MEDICINE

## 2019-11-25 PROCEDURE — 1036F TOBACCO NON-USER: CPT | Performed by: FAMILY MEDICINE

## 2019-11-25 PROCEDURE — G8427 DOCREV CUR MEDS BY ELIG CLIN: HCPCS | Performed by: FAMILY MEDICINE

## 2019-11-25 PROCEDURE — 2022F DILAT RTA XM EVC RTNOPTHY: CPT | Performed by: FAMILY MEDICINE

## 2019-11-25 PROCEDURE — 3017F COLORECTAL CA SCREEN DOC REV: CPT | Performed by: FAMILY MEDICINE

## 2019-11-25 PROCEDURE — 83036 HEMOGLOBIN GLYCOSYLATED A1C: CPT | Performed by: FAMILY MEDICINE

## 2019-11-25 PROCEDURE — G9899 SCRN MAM PERF RSLTS DOC: HCPCS | Performed by: FAMILY MEDICINE

## 2019-11-25 PROCEDURE — G8484 FLU IMMUNIZE NO ADMIN: HCPCS | Performed by: FAMILY MEDICINE

## 2019-11-25 RX ORDER — DOXEPIN HYDROCHLORIDE 10 MG/1
10 CAPSULE ORAL NIGHTLY
Qty: 30 CAPSULE | Refills: 3 | Status: SHIPPED
Start: 2019-11-25 | End: 2020-03-10 | Stop reason: ALTCHOICE

## 2019-11-25 RX ORDER — ISOSORBIDE MONONITRATE 30 MG/1
TABLET, EXTENDED RELEASE ORAL
Qty: 30 TABLET | Refills: 2 | Status: SHIPPED | OUTPATIENT
Start: 2019-11-25 | End: 2020-01-31 | Stop reason: SDUPTHER

## 2019-11-25 RX ORDER — AMLODIPINE BESYLATE 2.5 MG/1
2.5 TABLET ORAL DAILY
Qty: 30 TABLET | Refills: 5 | Status: CANCELLED | OUTPATIENT
Start: 2019-11-25 | End: 2019-12-25

## 2019-12-05 ENCOUNTER — OFFICE VISIT (OUTPATIENT)
Dept: PAIN MANAGEMENT | Age: 64
End: 2019-12-05
Payer: COMMERCIAL

## 2019-12-05 VITALS
WEIGHT: 189 LBS | HEIGHT: 70 IN | SYSTOLIC BLOOD PRESSURE: 124 MMHG | TEMPERATURE: 98 F | OXYGEN SATURATION: 97 % | BODY MASS INDEX: 27.06 KG/M2 | DIASTOLIC BLOOD PRESSURE: 76 MMHG | RESPIRATION RATE: 18 BRPM | HEART RATE: 81 BPM

## 2019-12-05 DIAGNOSIS — M96.1 LUMBAR POST-LAMINECTOMY SYNDROME: ICD-10-CM

## 2019-12-05 DIAGNOSIS — M54.16 LUMBAR RADICULOPATHY: ICD-10-CM

## 2019-12-05 DIAGNOSIS — M51.36 LUMBAR DEGENERATIVE DISC DISEASE: ICD-10-CM

## 2019-12-05 DIAGNOSIS — M51.26 HERNIATED LUMBAR INTERVERTEBRAL DISC: ICD-10-CM

## 2019-12-05 DIAGNOSIS — E11.42 DIABETIC PERIPHERAL NEUROPATHY (HCC): ICD-10-CM

## 2019-12-05 DIAGNOSIS — G89.4 CHRONIC PAIN SYNDROME: Primary | ICD-10-CM

## 2019-12-05 PROCEDURE — 3044F HG A1C LEVEL LT 7.0%: CPT | Performed by: PHYSICIAN ASSISTANT

## 2019-12-05 PROCEDURE — G8484 FLU IMMUNIZE NO ADMIN: HCPCS | Performed by: PHYSICIAN ASSISTANT

## 2019-12-05 PROCEDURE — G8427 DOCREV CUR MEDS BY ELIG CLIN: HCPCS | Performed by: PHYSICIAN ASSISTANT

## 2019-12-05 PROCEDURE — G9899 SCRN MAM PERF RSLTS DOC: HCPCS | Performed by: PHYSICIAN ASSISTANT

## 2019-12-05 PROCEDURE — 1036F TOBACCO NON-USER: CPT | Performed by: PHYSICIAN ASSISTANT

## 2019-12-05 PROCEDURE — 3017F COLORECTAL CA SCREEN DOC REV: CPT | Performed by: PHYSICIAN ASSISTANT

## 2019-12-05 PROCEDURE — G8598 ASA/ANTIPLAT THER USED: HCPCS | Performed by: PHYSICIAN ASSISTANT

## 2019-12-05 PROCEDURE — 99213 OFFICE O/P EST LOW 20 MIN: CPT | Performed by: PHYSICIAN ASSISTANT

## 2019-12-05 PROCEDURE — 2022F DILAT RTA XM EVC RTNOPTHY: CPT | Performed by: PHYSICIAN ASSISTANT

## 2019-12-05 PROCEDURE — G8419 CALC BMI OUT NRM PARAM NOF/U: HCPCS | Performed by: PHYSICIAN ASSISTANT

## 2019-12-05 RX ORDER — OXYCODONE HYDROCHLORIDE AND ACETAMINOPHEN 5; 325 MG/1; MG/1
1 TABLET ORAL DAILY
Qty: 30 TABLET | Refills: 0 | Status: SHIPPED | OUTPATIENT
Start: 2019-12-13 | End: 2019-12-05 | Stop reason: SDUPTHER

## 2019-12-05 RX ORDER — OXYCODONE HYDROCHLORIDE AND ACETAMINOPHEN 5; 325 MG/1; MG/1
1 TABLET ORAL DAILY
Qty: 30 TABLET | Refills: 0 | Status: SHIPPED | OUTPATIENT
Start: 2020-01-12 | End: 2020-01-16 | Stop reason: SDUPTHER

## 2019-12-09 ENCOUNTER — TELEPHONE (OUTPATIENT)
Dept: PAIN MANAGEMENT | Age: 64
End: 2019-12-09

## 2020-01-16 RX ORDER — OXYCODONE HYDROCHLORIDE AND ACETAMINOPHEN 5; 325 MG/1; MG/1
1 TABLET ORAL DAILY
Qty: 30 TABLET | Refills: 0 | Status: SHIPPED
Start: 2020-01-16 | End: 2020-02-06 | Stop reason: SDUPTHER

## 2020-01-20 ENCOUNTER — INITIAL CONSULT (OUTPATIENT)
Dept: NEUROSURGERY | Age: 65
End: 2020-01-20
Payer: COMMERCIAL

## 2020-01-20 VITALS
HEIGHT: 70 IN | BODY MASS INDEX: 28.92 KG/M2 | SYSTOLIC BLOOD PRESSURE: 126 MMHG | DIASTOLIC BLOOD PRESSURE: 63 MMHG | HEART RATE: 79 BPM | WEIGHT: 202 LBS

## 2020-01-20 PROCEDURE — G8427 DOCREV CUR MEDS BY ELIG CLIN: HCPCS | Performed by: NEUROLOGICAL SURGERY

## 2020-01-20 PROCEDURE — G8419 CALC BMI OUT NRM PARAM NOF/U: HCPCS | Performed by: NEUROLOGICAL SURGERY

## 2020-01-20 PROCEDURE — G9899 SCRN MAM PERF RSLTS DOC: HCPCS | Performed by: NEUROLOGICAL SURGERY

## 2020-01-20 PROCEDURE — 99203 OFFICE O/P NEW LOW 30 MIN: CPT | Performed by: NEUROLOGICAL SURGERY

## 2020-01-20 PROCEDURE — G8484 FLU IMMUNIZE NO ADMIN: HCPCS | Performed by: NEUROLOGICAL SURGERY

## 2020-01-20 ASSESSMENT — ENCOUNTER SYMPTOMS
BACK PAIN: 1
PHOTOPHOBIA: 0
TROUBLE SWALLOWING: 0
ABDOMINAL PAIN: 0
SHORTNESS OF BREATH: 0

## 2020-01-20 NOTE — PROGRESS NOTES
infection of bone ([address]     infection of rt foot, resolved.     Anemia of chronic disease     Breast cancer ([address]     right breast, 2008/ bladder, 2006    CAD (coronary artery disease)     Chronic diastolic CHF (congestive heart failure) ([address] 09/23/2014 9/23/14- echocardiogram revealed moderate LV concentric hypertrophy, stage III diastolic dysfunction, mild tricuspid regurgitation    CKD (chronic kidney disease) stage 4, GFR 15-29 ml/min ([facility])     Diabetic retinopathy ([address]     Glaucoma     Hemodialysis patient ([address]     [facility]  mon wed fri    Hyperkalemia, diminished renal excretion 11/9/2017    Hypertension     Hypoglycemia unawareness in type 1 diabetes mellitus ([address] 11/7/2017    Insulin dependent type 2 diabetes mellitus ([address]     Neuropathy     feet    Osteomyelitis due to secondary diabetes ([address]     rt great toe with amputation    Patient is Bahai 11/7/2017    Refusal of blood product     patient states she dose not take blood transfusion    Ventricular hypertrophy     Vitreous hemorrhage ([address]     left eye     Past Surgical History:   Procedure Laterality Date    AMPUTATION      right great toe    ANKLE SURGERY      correction on charcot joint of right ankle    CATARACT REMOVAL      bilateral    CHOLECYSTECTOMY      CYSTOSCOPY      DIALYSIS FISTULA CREATION Left 01/31/2018    upper arm/Dr. Dahiana Swanson    ECHO COMPL W DOP COLOR FLOW  2/14/2013         ECHO COMPLETE  9/17/2013         MASTECTOMY      right    OTHER SURGICAL HISTORY  9/27/2011    PPV, membranectomy, laser Right eye    OTHER SURGICAL HISTORY  insertion lumbar drain insertion    10/12/`14    OTHER SURGICAL HISTORY  10/22/15    percutaneous lead placement for spinal cord stimulator    OTHER SURGICAL HISTORY  11/3/15    Spinal; cord stimulator    AK AV ANAST,UP ARM BASILIC VEIN TRANSPOSIT Left 5/15/2018    TRANSPOSITION STAGE II AV FISTULA - LEFT UPPER ARM performed by Tiny Herrera MD at [address] [place] [address]  [place] LIGATN ANGIOACCESS AV FISTULA Left 9/25/2018    SUPERFICIALIZATION AV FISTULA - LEFT ARM performed by Ying Núñez MD at [address] RPR RETINAL [place] W/VITRECTOMY ANY METH Left 4/10/2018    PARS PLANA VITRECTOMY 25 GAUGE RETINAL DETACHMENT REPAIR air fluid exchange, endolaser performed by Urszula Arias MD at [address] TUNNELED VENOUS CATHETER PLACEMENT  11/15/2017    VITRECTOMY Left 04/10/2018    PARS PLANA VITRECTOMY; RETINAL DETACHMENT REPAIR; GAS BUBBLE; LASER LEFT EYE      Family History   Problem Relation Age of Onset    Breast Cancer Mother 61    Hypertension Mother     Heart Disease Father     Prostate Cancer Father     Breast Cancer Maternal Grandmother 61      Social History     Socioeconomic History    Marital status: Single     Spouse name: Not on file    Number of children: Not on file    Years of education: Not on file    Highest education level: Not on file   Occupational History    Not on file   Social Needs    Financial resource strain: Not on file    Food insecurity:     Worry: Not on file     Inability: Not on file    Transportation needs:     Medical: Not on file     Non-medical: Not on file   Tobacco Use    Smoking status: Never Smoker    Smokeless tobacco: Never Used   Substance and Sexual Activity    Alcohol use: No    Drug use: No    Sexual activity: Never   Lifestyle    Physical activity:     Days per week: Not on file     Minutes per session: Not on file    Stress: Not on file   Relationships    Social connections:     Talks on phone: Not on file     Gets together: Not on file     Attends Zoroastrianism service: Not on file     Active member of club or organization: Not on file     Attends meetings of clubs or organizations: Not on file     Relationship status: Not on file    Intimate partner violence:     Fear of current or ex partner: Not on file     Emotionally abused: Not on file     Physically abused: Not on file Lakia Lyle MD   Pager: [phone]

## 2020-01-22 ENCOUNTER — TELEPHONE (OUTPATIENT)
Dept: ORTHOPEDIC SURGERY | Age: 65
End: 2020-01-22

## 2020-01-23 ENCOUNTER — OFFICE VISIT (OUTPATIENT)
Dept: ORTHOPEDIC SURGERY | Age: 65
End: 2020-01-23
Payer: COMMERCIAL

## 2020-01-23 VITALS — SYSTOLIC BLOOD PRESSURE: 126 MMHG | HEART RATE: 88 BPM | DIASTOLIC BLOOD PRESSURE: 65 MMHG | RESPIRATION RATE: 20 BRPM

## 2020-01-23 PROCEDURE — 3017F COLORECTAL CA SCREEN DOC REV: CPT | Performed by: ORTHOPAEDIC SURGERY

## 2020-01-23 PROCEDURE — G9899 SCRN MAM PERF RSLTS DOC: HCPCS | Performed by: ORTHOPAEDIC SURGERY

## 2020-01-23 PROCEDURE — G8419 CALC BMI OUT NRM PARAM NOF/U: HCPCS | Performed by: ORTHOPAEDIC SURGERY

## 2020-01-23 PROCEDURE — 1036F TOBACCO NON-USER: CPT | Performed by: ORTHOPAEDIC SURGERY

## 2020-01-23 PROCEDURE — 99214 OFFICE O/P EST MOD 30 MIN: CPT | Performed by: ORTHOPAEDIC SURGERY

## 2020-01-23 PROCEDURE — G8484 FLU IMMUNIZE NO ADMIN: HCPCS | Performed by: ORTHOPAEDIC SURGERY

## 2020-01-23 PROCEDURE — G8427 DOCREV CUR MEDS BY ELIG CLIN: HCPCS | Performed by: ORTHOPAEDIC SURGERY

## 2020-01-23 NOTE — PROGRESS NOTES
Department of Orthopedic Surgery  History and Physical      CHIEF COMPLAINT: Bilateral hand numbness and tingling along with pain    HISTORY OF PRESENT ILLNESS:                The patient is a RHD 60 y.o. female who presents with bilateral hand numbness and tingling along with pain. Patient states for the last 2 to 3 years she has had increased pain along with numbness and tingling to bilateral hands. She reports nocturnal symptoms every night of pain along with numbness and tingling. She states her numbness is constant to all of her fingers. She has difficulty tying shoes and doing normal daily activities. She does have a fistula in the left upper extremity for dialysis. She also has a history of breast cancer and has lymphedema to her right upper extremity with axillary nodes removed previously. She did have an EMG and nerve conduction study test dated May 16 of 2019. Right median nerve motor latency was 5.83 and left was 9.06.  Right ulnar nerve motor velocity was 30 below the elbow and 23 above the elbow left ulnar nerve motor velocity was 44 below the elbow and 43 above the elbow. She had non-recordable right and left median nerve sensory latencies. On the EMG portion of the exam she had multiple changes present. This is consistent with bilateral severe carpal tunnel syndrome with denervation of bilateral APBs. There is evidence of right ulnar neuropathy and multilevel chronic cervical motor radiculopathy bilaterally involving C6-C8. There is also evidence of chronic moderately severe peripheral polyneuropathy. Past Medical History:        Diagnosis Date    Acute infection of bone ([address]     infection of rt foot, resolved.     Anemia of chronic disease     Breast cancer ([address]     right breast, 2008/ bladder, 2006    CAD (coronary artery disease)     Chronic diastolic CHF (congestive heart failure) ([address] 09/23/2014 9/23/14- echocardiogram revealed moderate LV concentric hypertrophy, stage OR    SPINAL CORD DECOMPRESSION      L2    TUNNELED VENOUS CATHETER PLACEMENT  11/15/2017    VITRECTOMY Left 04/10/2018    PARS PLANA VITRECTOMY; RETINAL DETACHMENT REPAIR; GAS BUBBLE; LASER LEFT EYE     Current Medications:   No current facility-administered medications for this visit. Allergies:  Lasix [furosemide]    Social History:   TOBACCO:   reports that she has never smoked. She has never used smokeless tobacco.  ETOH:   reports no history of alcohol use. DRUGS:   reports no history of drug use. ACTIVITIES OF DAILY LIVING:    OCCUPATION:    Family History:       Problem Relation Age of Onset    Breast Cancer Mother 61    Hypertension Mother     Heart Disease Father     Prostate Cancer Father     Breast Cancer Maternal Grandmother 61       REVIEW OF SYSTEMS:  CONSTITUTIONAL:  negative  EYES:  negative  HEENT:  negative  RESPIRATORY:  negative  CARDIOVASCULAR:  negative  GASTROINTESTINAL:  negative  GENITOURINARY:  CKD  INTEGUMENT/BREAST:  negative  HEMATOLOGIC/LYMPHATIC:  negative  ALLERGIC/IMMUNOLOGIC:  negative  ENDOCRINE:  DM  MUSCULOSKELETAL:  pain  NEUROLOGICAL:  N/T  BEHAVIOR/PSYCH:  negative    PHYSICAL EXAM:    VITALS:  /65 (Site: Right Upper Arm, Position: Sitting, Cuff Size: Medium Adult)   Pulse 88   Resp 20   CONSTITUTIONAL:  awake, alert, cooperative, no apparent distress, and appears stated age  EYES:  Lids and lashes normal, pupils equal, round and reactive to light, extra ocular muscles intact, sclera clear, conjunctiva normal  ENT:  Normocephalic, without obvious abnormality, atraumatic, sinuses nontender on palpation, external ears without lesions, oral pharynx with moist mucus membranes, tonsils without erythema or exudates, gums normal and good dentition.   NECK:  Supple, symmetrical, trachea midline, no adenopathy, thyroid symmetric, not enlarged and no tenderness, skin normal  LUNGS:  CTA  CARDIOVASCULAR:  2+ radial pulses, extremities warm and well perfused  ABDOMEN: obese, NTTP  CHEST:  Atraumatic   GENITAL/URINARY:  deferred  NEUROLOGIC:  Awake, alert, oriented to name, place and time. Cranial nerves II-XII are grossly intact. Motor is 5 out of 5 bilaterally. Sensory is intact.  gait is normal.  MUSCULOSKELETAL:    Right upper extremity: Lymphedema throughout the extremity. Nontender at the elbow with negative Tinel's of the cubital tunnel, positive Tinel's of the carpal tunnel, positive Brissa's, negative Finkelstein's, negative CMC grind, negative tenderness over the A1 pulleys with no active triggering. Altered sensation to the distal aspects of the thumb, index, middle, ring and small fingers. Negative Wartenberg's and cross finger testing. APB with no function. Brisk capillary refill. Otherwise neurovascular intact. Left upper extremity: Fistula noted in the upper arm. Nontender at the elbow with negative Tinel's of the cubital tunnel, positive Tinel's of the carpal tunnel, positive Brissa's, negative Finkelstein's, negative CMC grind, negative tenderness over the A1 pulleys with no active triggering. Altered sensation to the distal aspects of the thumb, index, middle, ring and small fingers. Negative Wartenberg's and cross finger testing. APB strength 4/5. Brisk capillary refill. Otherwise neurovascular intact. DATA:    CBC:   Lab Results   Component Value Date    WBC 4.2 09/25/2018    RBC 3.71 09/25/2018    HGB 10.4 09/25/2018    HCT 34.2 09/25/2018    MCV 92.2 09/25/2018    MCH 28.0 09/25/2018    MCHC 30.4 09/25/2018    RDW 15.2 09/25/2018     09/25/2018    MPV 10.7 09/25/2018     PT/INR:    Lab Results   Component Value Date    PROTIME 10.5 09/25/2018    PROTIME 10.4 02/15/2012    INR 0.9 09/25/2018       Radiology Review: X-rays of bilateral hands were obtained today in the office and reviewed with patient. 4 views: AP, lateral, oblique, carpal tunnel view demonstrate no acute fracture dislocation.   Bilateral thumb CMC joint

## 2020-01-29 RX ORDER — ATORVASTATIN CALCIUM 40 MG/1
TABLET, FILM COATED ORAL
Qty: 30 TABLET | Refills: 5 | Status: SHIPPED
Start: 2020-01-29 | End: 2020-04-30 | Stop reason: SDUPTHER

## 2020-01-31 RX ORDER — FOLIC ACID 1 MG/1
1 TABLET ORAL
Qty: 30 TABLET | Refills: 5 | Status: SHIPPED | OUTPATIENT
Start: 2020-01-31 | End: 2020-01-31 | Stop reason: SDUPTHER

## 2020-01-31 RX ORDER — ISOSORBIDE MONONITRATE 30 MG/1
TABLET, EXTENDED RELEASE ORAL
Qty: 30 TABLET | Refills: 5 | Status: SHIPPED
Start: 2020-01-31 | End: 2020-06-23 | Stop reason: SDUPTHER

## 2020-01-31 RX ORDER — FOLIC ACID 1 MG/1
1 TABLET ORAL
Qty: 150 TABLET | Refills: 5 | Status: SHIPPED
Start: 2020-01-31 | End: 2020-07-16

## 2020-02-05 NOTE — PROGRESS NOTES
[address]  [address] [facility], [address] [facility]    Follow up Note      Edy Bethea     Date of Visit:  2020    CC:  Patient presents for follow up   Chief Complaint   Patient presents with    Follow-up     mid back to legs      HPI:    Pain is worse in hands. Legs continue to be very painful in the morning. Change in quality of symptoms:no. Patient satisfaction with analgesia: Good. Medication side effects:none. Recent diagnostic testing:none. Recent interventional procedures:none. She has been on anticoagulation medications to include heparin through dialysis. The patient  has not been on herbal supplements. The patient is diabetic. Imaging:   Cervical xray 2018 -   Alignment of the vertebral bodies appears to be near-anatomic   No fracture or foreign body is identified. The disc spaces demonstrate severe degenerative changes. There is no significant loss of the vertebral body height   The are severe degenerative changes involving the facets.      CT lumbar 2014 (pre-surgical) -   IMPRESSION:    1. Postsurgical changes, status post laminectomy at L3-L4 and   L4-L5 and fusion of L3-L4 and L5.   2. No evidence for recurrent disc herniation. No dural or epidural   abscess or hematoma. 3. Pedicle screws are in good position. Alignment is satisfactory.      Lumbar myelogram 2014 -   IMPRESSION:   1. Severe spinal canal stenosis in L4-L5 level and moderate to   severe in L3-L4 level as described above.       2. Distraction of the joint space of the facet joints of L4-L5   with hypertrophy which explains the anterolisthesis of L4 in   relation to L5.       3. Degenerative disc disease predominant seen in the L4-L5 level.       4. Encroachment of the neural foramina predominant in the level of   L4-L5 bilaterally.      2015 EMG/NCT:     IMPRESSION:  Electrodiagnostic examination of both arms and both legs disclosed evidence  diagnostic of the followin. 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 Such findings are seen in disorders  as diabetes mellitus, hypothyroidism, nutritional deficiencies and toxic  Neuropathies.     2. Left median neuropathy at/or distal to the wrist of minimal severity. These findings were consistent with carpal tunnel syndrome. This disorder  is more commonly seen in underlying peripheral neuropathic disease.     3. Acute and chronic denervation changes in the proximal muscles of the  right leg suggesting proximal motor neuropathy as in diabetic amyotrophy  versus possible L3 and L4 motor radiculopathies. These motor radiculopathies  could not be proven due to difficulty performing needle examination of the  lumbar paraspinal muscles.     There were no other peripheral neuropathies. There were no other motor  radiculopathies. Sensory radiculopathies cannot be evaluated by  electrodiagnostic means. The patient's sudden onset of difficulties in her right thigh was clinically  consistent with diabetic amyotrophy. However, MR imaging  studies of the lumbosacral spine were recommended to rule out compressive  radicular disease in the upper lumbar nerve roots. Clinical correlation was  highly advised. Potential Aberrant Drug-Related Behavior:  no     Urine Drug Screenin2018 consistent  2019 consistent  2019 consistent   2019 consistent    OARRS report:  2018 consistent  2018 consistent  10/18/2018 consistent  2018 consistent  1/3/2019 consistent  2019 consistent  2019 consistent  2019 consistent  2019 consistent  2019 consistent  10/08/2019 consistent  2019 consistent   2020 consistent    Past Medical History:   Diagnosis Date    Acute infection of bone ([address]     infection of rt foot, resolved.     Anemia of chronic disease     Breast cancer ([address]     right breast, (WRIST BRACE) MISC Hard neutral position right wrist brace  Size to fit  Dx:  Wrist pain/carpal tunnel syndrome 5/21/19  Yes Gaby Orellana MD   camphor-menthol GINNY CALVERT [facility]) 0.5-0.5 % lotion Apply topically as needed. 5/21/19  Yes Gaby Orellana MD   insulin lispro (HUMALOG KWIKPEN) 100 UNIT/ML pen Take 2 units > 140 , take 3 units > 170, take 4 units > 200 units 5/3/19  Yes Gaby Orellana MD   insulin aspart (NOVOLOG FLEXPEN) 100 UNIT/ML injection pen Take 2 units > 140 , take 3 units > 170, take 4 units > 200 units 4/29/19  Yes Gaby Orellana MD   insulin glargine (LANTUS SOLOSTAR) 100 UNIT/ML injection pen Inject 11 Units into the skin nightly 4/29/19  Yes Gaby Orellana MD   NONFORMULARY    Yes Historical Provider, MD   blood glucose test strips (ACCU-CHEK ACTIVE STRIPS) strip 1 each by In Vitro route 2 times daily As needed. 12/4/18  Yes Gaby Orellana MD   Insulin Pen Needle (PEN NEEDLES) 31G X 6 MM MISC 1 each by Does not apply route daily 12/4/18  Yes Gaby Orellana MD   sevelamer (RENVELA) 800 MG tablet Take 1 tablet by mouth 3 times daily (with meals)   Yes Historical Provider, MD   cloNIDine (CATAPRES) 0.2 MG tablet Take 1 tablet by mouth 2 times daily 11/24/17  Yes Stanton Narvaez DO   aspirin EC 81 MG EC tablet Take 81 mg by mouth daily Indications: patient taking T, Th, Sat, and Sun Last dose 9-19   Yes Historical Provider, MD   cyanocobalamin 1000 MCG tablet Take 1 tablet by mouth daily. 10/31/14  Yes Jonathan Crowe MD   darbepoetin fani-polysorbate (ARANESP) 40 MCG/0.4ML SOLN injection Inject 0.4 mLs into the skin once a week. Patient taking differently: Inject 40 mcg into the skin every 14 days  10/31/14  Yes Tova Short MD   Bimatoprost (LUMIGAN OP) Place 1 drop into both eyes daily Indications: every evening As directed   Yes Historical Provider, MD   gabapentin (NEURONTIN) 100 MG capsule Take 2 capsules by mouth every evening for 30 days.  10/8/19 1/20/20  BRIAN William       Allergies

## 2020-02-06 ENCOUNTER — OFFICE VISIT (OUTPATIENT)
Dept: PAIN MANAGEMENT | Age: 65
End: 2020-02-06
Payer: COMMERCIAL

## 2020-02-06 VITALS
WEIGHT: 189 LBS | SYSTOLIC BLOOD PRESSURE: 98 MMHG | TEMPERATURE: 97.6 F | OXYGEN SATURATION: 98 % | RESPIRATION RATE: 16 BRPM | HEART RATE: 86 BPM | DIASTOLIC BLOOD PRESSURE: 54 MMHG | BODY MASS INDEX: 27.06 KG/M2 | HEIGHT: 70 IN

## 2020-02-06 PROBLEM — M48.062 SPINAL STENOSIS OF LUMBAR REGION WITH NEUROGENIC CLAUDICATION: Status: ACTIVE | Noted: 2020-02-06

## 2020-02-06 PROCEDURE — 3017F COLORECTAL CA SCREEN DOC REV: CPT | Performed by: PAIN MEDICINE

## 2020-02-06 PROCEDURE — 3046F HEMOGLOBIN A1C LEVEL >9.0%: CPT | Performed by: PAIN MEDICINE

## 2020-02-06 PROCEDURE — G8484 FLU IMMUNIZE NO ADMIN: HCPCS | Performed by: PAIN MEDICINE

## 2020-02-06 PROCEDURE — 2022F DILAT RTA XM EVC RTNOPTHY: CPT | Performed by: PAIN MEDICINE

## 2020-02-06 PROCEDURE — G8419 CALC BMI OUT NRM PARAM NOF/U: HCPCS | Performed by: PAIN MEDICINE

## 2020-02-06 PROCEDURE — G8427 DOCREV CUR MEDS BY ELIG CLIN: HCPCS | Performed by: PAIN MEDICINE

## 2020-02-06 PROCEDURE — G9899 SCRN MAM PERF RSLTS DOC: HCPCS | Performed by: PAIN MEDICINE

## 2020-02-06 PROCEDURE — 99214 OFFICE O/P EST MOD 30 MIN: CPT | Performed by: PAIN MEDICINE

## 2020-02-06 PROCEDURE — 1036F TOBACCO NON-USER: CPT | Performed by: PAIN MEDICINE

## 2020-02-06 RX ORDER — OXYCODONE HYDROCHLORIDE AND ACETAMINOPHEN 5; 325 MG/1; MG/1
1 TABLET ORAL DAILY
Qty: 30 TABLET | Refills: 0 | Status: SHIPPED
Start: 2020-03-14 | End: 2020-03-31 | Stop reason: SDUPTHER

## 2020-02-06 RX ORDER — OXYCODONE HYDROCHLORIDE AND ACETAMINOPHEN 5; 325 MG/1; MG/1
1 TABLET ORAL DAILY
Qty: 30 TABLET | Refills: 0 | Status: SHIPPED
Start: 2020-02-15 | End: 2020-02-12 | Stop reason: SDUPTHER

## 2020-02-06 RX ORDER — GABAPENTIN 100 MG/1
200 CAPSULE ORAL 3 TIMES DAILY
Qty: 90 CAPSULE | Refills: 1 | Status: SHIPPED
Start: 2020-02-06 | End: 2021-05-19

## 2020-02-06 NOTE — PROGRESS NOTES
Rick Hancock presents to the [facility] on 2/6/2020. Keyur Varela is complaining of pain lower back to legs . The pain is constant. The pain is described as burning/tingling . Pain is rated on her best day at a 4, on her worst day at a 10, and on average at a 8 on the VAS scale. She took her last dose of Percocet yesterday     Any procedures since your last visit: No, with % relief. Pacemaker or defibrilator: No managed by    She is not on NSAIDS and is  on anticoagulation medications to include ASA and is managed by PCP     BP (!) 98/54   Pulse 86   Temp 97.6 °F (36.4 °C) (Oral)   Resp 16   Ht 5' 10\" (1.778 m)   Wt 189 lb (85.7 kg)   SpO2 98%   BMI 27.12 kg/m²      No LMP recorded.  Patient is postmenopausal.

## 2020-02-12 ENCOUNTER — OFFICE VISIT (OUTPATIENT)
Dept: ORTHOPEDIC SURGERY | Age: 65
End: 2020-02-12
Payer: COMMERCIAL

## 2020-02-12 VITALS
HEIGHT: 70 IN | BODY MASS INDEX: 27.06 KG/M2 | DIASTOLIC BLOOD PRESSURE: 80 MMHG | SYSTOLIC BLOOD PRESSURE: 157 MMHG | WEIGHT: 189 LBS | HEART RATE: 79 BPM

## 2020-02-12 PROCEDURE — 99213 OFFICE O/P EST LOW 20 MIN: CPT | Performed by: ORTHOPAEDIC SURGERY

## 2020-02-12 PROCEDURE — 3017F COLORECTAL CA SCREEN DOC REV: CPT | Performed by: ORTHOPAEDIC SURGERY

## 2020-02-12 PROCEDURE — 20610 DRAIN/INJ JOINT/BURSA W/O US: CPT | Performed by: ORTHOPAEDIC SURGERY

## 2020-02-12 PROCEDURE — G8484 FLU IMMUNIZE NO ADMIN: HCPCS | Performed by: ORTHOPAEDIC SURGERY

## 2020-02-12 PROCEDURE — 1036F TOBACCO NON-USER: CPT | Performed by: ORTHOPAEDIC SURGERY

## 2020-02-12 PROCEDURE — G8427 DOCREV CUR MEDS BY ELIG CLIN: HCPCS | Performed by: ORTHOPAEDIC SURGERY

## 2020-02-12 PROCEDURE — G9899 SCRN MAM PERF RSLTS DOC: HCPCS | Performed by: ORTHOPAEDIC SURGERY

## 2020-02-12 PROCEDURE — G8419 CALC BMI OUT NRM PARAM NOF/U: HCPCS | Performed by: ORTHOPAEDIC SURGERY

## 2020-02-12 RX ORDER — TRIAMCINOLONE ACETONIDE 40 MG/ML
40 INJECTION, SUSPENSION INTRA-ARTICULAR; INTRAMUSCULAR ONCE
Status: COMPLETED | OUTPATIENT
Start: 2020-02-12 | End: 2020-02-12

## 2020-02-12 RX ORDER — ISOSORBIDE MONONITRATE 60 MG/1
TABLET, EXTENDED RELEASE ORAL
COMMUNITY
Start: 2019-12-26 | End: 2020-03-03

## 2020-02-12 RX ORDER — TRIAMCINOLONE ACETONIDE 1 MG/G
CREAM TOPICAL
COMMUNITY
Start: 2020-01-27 | End: 2020-03-03

## 2020-02-12 RX ORDER — SUCROFERRIC OXYHYDROXIDE 500 MG/1
2 TABLET, CHEWABLE ORAL
COMMUNITY
Start: 2020-01-09 | End: 2021-09-28 | Stop reason: CLARIF

## 2020-02-12 RX ORDER — LIDOCAINE HYDROCHLORIDE 10 MG/ML
4 INJECTION, SOLUTION INFILTRATION; PERINEURAL ONCE
Status: COMPLETED | OUTPATIENT
Start: 2020-02-12 | End: 2020-02-12

## 2020-02-12 RX ADMIN — TRIAMCINOLONE ACETONIDE 40 MG: 40 INJECTION, SUSPENSION INTRA-ARTICULAR; INTRAMUSCULAR at 08:40

## 2020-02-12 RX ADMIN — LIDOCAINE HYDROCHLORIDE 4 ML: 10 INJECTION, SOLUTION INFILTRATION; PERINEURAL at 08:40

## 2020-02-12 NOTE — PROGRESS NOTES
Assisted Dr. Alejandra Yung with injection of 4 cc lidocaine/1 cc kenalog in R shoulder. Patient tolerated procedure well. Verbal instructions were given.
continue with low impact strengthening exercises.   Follow-up in 4 months    Leann Aguirre MD  Orthopaedic Surgery   2/11/20  8:44 AM

## 2020-02-27 ENCOUNTER — PREP FOR PROCEDURE (OUTPATIENT)
Dept: ORTHOPEDIC SURGERY | Age: 65
End: 2020-02-27

## 2020-02-27 RX ORDER — SODIUM CHLORIDE 9 MG/ML
INJECTION, SOLUTION INTRAVENOUS CONTINUOUS
Status: CANCELLED | OUTPATIENT
Start: 2020-02-27

## 2020-02-27 RX ORDER — SODIUM CHLORIDE 0.9 % (FLUSH) 0.9 %
10 SYRINGE (ML) INJECTION PRN
Status: CANCELLED | OUTPATIENT
Start: 2020-02-27

## 2020-02-27 RX ORDER — SODIUM CHLORIDE 0.9 % (FLUSH) 0.9 %
10 SYRINGE (ML) INJECTION EVERY 12 HOURS SCHEDULED
Status: CANCELLED | OUTPATIENT
Start: 2020-02-27

## 2020-03-03 ENCOUNTER — HOSPITAL ENCOUNTER (OUTPATIENT)
Age: 65
Discharge: HOME | End: 2020-03-05
Payer: COMMERCIAL

## 2020-03-03 ENCOUNTER — OFFICE VISIT (OUTPATIENT)
Dept: FAMILY MEDICINE CLINIC | Age: 65
End: 2020-03-03
Payer: COMMERCIAL

## 2020-03-03 VITALS
HEIGHT: 70 IN | WEIGHT: 202 LBS | BODY MASS INDEX: 28.92 KG/M2 | HEART RATE: 83 BPM | SYSTOLIC BLOOD PRESSURE: 93 MMHG | RESPIRATION RATE: 16 BRPM | DIASTOLIC BLOOD PRESSURE: 57 MMHG | TEMPERATURE: 98 F | OXYGEN SATURATION: 95 %

## 2020-03-03 LAB
ALBUMIN SERPL-MCNC: 4.2 G/DL (ref 3.5–5.2)
ALP BLD-CCNC: 167 U/L (ref 35–104)
ALT SERPL-CCNC: 15 U/L (ref 0–32)
ANION GAP SERPL CALCULATED.3IONS-SCNC: 23 MMOL/L (ref 7–16)
AST SERPL-CCNC: 18 U/L (ref 0–31)
BASOPHILS ABSOLUTE: 0.03 E9/L (ref 0–0.2)
BASOPHILS RELATIVE PERCENT: 0.7 % (ref 0–2)
BILIRUB SERPL-MCNC: 0.5 MG/DL (ref 0–1.2)
BUN BLDV-MCNC: 31 MG/DL (ref 8–23)
CALCIUM SERPL-MCNC: 9.2 MG/DL (ref 8.6–10.2)
CHLORIDE BLD-SCNC: 93 MMOL/L (ref 98–107)
CO2: 27 MMOL/L (ref 22–29)
CREAT SERPL-MCNC: 5.2 MG/DL (ref 0.5–1)
EOSINOPHILS ABSOLUTE: 0.23 E9/L (ref 0.05–0.5)
EOSINOPHILS RELATIVE PERCENT: 5 % (ref 0–6)
GFR AFRICAN AMERICAN: 10
GFR NON-AFRICAN AMERICAN: 10 ML/MIN/1.73
GLUCOSE BLD-MCNC: 131 MG/DL (ref 74–99)
HBA1C MFR BLD: 7.4 %
HCT VFR BLD CALC: 40.8 % (ref 34–48)
HEMOGLOBIN: 11.9 G/DL (ref 11.5–15.5)
IMMATURE GRANULOCYTES #: 0.02 E9/L
IMMATURE GRANULOCYTES %: 0.4 % (ref 0–5)
LYMPHOCYTES ABSOLUTE: 1.6 E9/L (ref 1.5–4)
LYMPHOCYTES RELATIVE PERCENT: 34.9 % (ref 20–42)
MCH RBC QN AUTO: 26.9 PG (ref 26–35)
MCHC RBC AUTO-ENTMCNC: 29.2 % (ref 32–34.5)
MCV RBC AUTO: 92.3 FL (ref 80–99.9)
MONOCYTES ABSOLUTE: 0.7 E9/L (ref 0.1–0.95)
MONOCYTES RELATIVE PERCENT: 15.3 % (ref 2–12)
NEUTROPHILS ABSOLUTE: 2.01 E9/L (ref 1.8–7.3)
NEUTROPHILS RELATIVE PERCENT: 43.7 % (ref 43–80)
PDW BLD-RTO: 17.6 FL (ref 11.5–15)
PLATELET # BLD: 194 E9/L (ref 130–450)
PMV BLD AUTO: 11.1 FL (ref 7–12)
POTASSIUM SERPL-SCNC: 5.2 MMOL/L (ref 3.5–5)
RBC # BLD: 4.42 E12/L (ref 3.5–5.5)
SODIUM BLD-SCNC: 143 MMOL/L (ref 132–146)
TOTAL PROTEIN: 7.4 G/DL (ref 6.4–8.3)
WBC # BLD: 4.6 E9/L (ref 4.5–11.5)

## 2020-03-03 PROCEDURE — G9899 SCRN MAM PERF RSLTS DOC: HCPCS | Performed by: FAMILY MEDICINE

## 2020-03-03 PROCEDURE — 1036F TOBACCO NON-USER: CPT | Performed by: FAMILY MEDICINE

## 2020-03-03 PROCEDURE — 85025 COMPLETE CBC W/AUTO DIFF WBC: CPT

## 2020-03-03 PROCEDURE — G8419 CALC BMI OUT NRM PARAM NOF/U: HCPCS | Performed by: FAMILY MEDICINE

## 2020-03-03 PROCEDURE — 3017F COLORECTAL CA SCREEN DOC REV: CPT | Performed by: FAMILY MEDICINE

## 2020-03-03 PROCEDURE — 99213 OFFICE O/P EST LOW 20 MIN: CPT | Performed by: FAMILY MEDICINE

## 2020-03-03 PROCEDURE — 80053 COMPREHEN METABOLIC PANEL: CPT

## 2020-03-03 PROCEDURE — 83036 HEMOGLOBIN GLYCOSYLATED A1C: CPT | Performed by: FAMILY MEDICINE

## 2020-03-03 PROCEDURE — 3051F HG A1C>EQUAL 7.0%<8.0%: CPT | Performed by: FAMILY MEDICINE

## 2020-03-03 PROCEDURE — 93000 ELECTROCARDIOGRAM COMPLETE: CPT | Performed by: FAMILY MEDICINE

## 2020-03-03 PROCEDURE — G8427 DOCREV CUR MEDS BY ELIG CLIN: HCPCS | Performed by: FAMILY MEDICINE

## 2020-03-03 PROCEDURE — G8484 FLU IMMUNIZE NO ADMIN: HCPCS | Performed by: FAMILY MEDICINE

## 2020-03-03 PROCEDURE — 36415 COLL VENOUS BLD VENIPUNCTURE: CPT | Performed by: FAMILY MEDICINE

## 2020-03-03 PROCEDURE — 2022F DILAT RTA XM EVC RTNOPTHY: CPT | Performed by: FAMILY MEDICINE

## 2020-03-03 ASSESSMENT — PATIENT HEALTH QUESTIONNAIRE - PHQ9
SUM OF ALL RESPONSES TO PHQ9 QUESTIONS 1 & 2: 0
SUM OF ALL RESPONSES TO PHQ QUESTIONS 1-9: 0
1. LITTLE INTEREST OR PLEASURE IN DOING THINGS: 0
SUM OF ALL RESPONSES TO PHQ QUESTIONS 1-9: 0
2. FEELING DOWN, DEPRESSED OR HOPELESS: 0

## 2020-03-03 NOTE — PROGRESS NOTES
[facility]  Family Medicine Residency Program  Phone: [phone]  Fax: [phone]    Patient:  Salvador Aggarwal 60 y.o. female                                 Date of Service: 3/3/20                            Chiefcomplaint:   Chief Complaint   Patient presents with    Pre-op Exam     carpal tunnel          History of Present Illness: The patient is a 60 y.o. female  presented to the clinic with complaints as above. Pre op clearance  Left carpal tunnel surgery on March 17th  Pt has had multiple surgeries in the past  No issues with anesthesia  No bleeding diathesis  No cp or sob  METS 4-6: does everything at home, cleaning   Has to only stop due to arthritic pains   Has been started on Midodrine   Taking Imdur 60 mg at night and 30 mg at night  Was prescribed by her cardiologist in 2015  Review of his note indicated decompensated HF  Most recent echo in 2017 shows EF of 55%  But stage II diastolic HF  BP is low at home  Is also on Percocet for pain per paint mgt (5-325)  Takes 1 a day  Bumex 3-4 weeks a week  Dialysis M, W, F  BP was too low to remove fluid yesterday   Her weight is stable   Dry weight is  lbs  Is on transplant list - April 21st has an appointment     [facility] with mammogram, colonoscopy     Review of Systems:   Refer to HPI     Past Medical History:      Diagnosis Date    Acute infection of bone ([address]     infection of rt foot, resolved.     Anemia of chronic disease     Breast cancer ([address]     right breast, 2008/ bladder, 2006    CAD (coronary artery disease)     Chronic diastolic CHF (congestive heart failure) ([address] 09/23/2014 9/23/14- echocardiogram revealed moderate LV concentric hypertrophy, stage III diastolic dysfunction, mild tricuspid regurgitation    CKD (chronic kidney disease) stage 4, GFR 15-29 ml/min ([facility])     Diabetic retinopathy ([address]     Glaucoma     Hemodialysis patient ([address]     [facility]  mon wed fri    Hyperkalemia, diminished renal excretion 11/9/2017    Hypertension     Hypoglycemia unawareness in type 1 diabetes mellitus ([address] 11/7/2017    Insulin dependent type 2 diabetes mellitus ([address]     Neuropathy     feet    Osteomyelitis due to secondary diabetes ([address]     rt great toe with amputation    Patient is Sabianist 11/7/2017    Refusal of blood product     patient states she dose not take blood transfusion    Ventricular hypertrophy     Vitreous hemorrhage ([address]     left eye       Past Surgical History:        Procedure Laterality Date    AMPUTATION      right great toe    ANKLE SURGERY      correction on charcot joint of right ankle    CATARACT REMOVAL      bilateral    CHOLECYSTECTOMY      CYSTOSCOPY      DIALYSIS FISTULA CREATION Left 01/31/2018    upper arm/Dr. Karen Alas    ECHO COMPL W DOP COLOR FLOW  2/14/2013         ECHO COMPLETE  9/17/2013         MASTECTOMY      right    OTHER SURGICAL HISTORY  9/27/2011    PPV, membranectomy, laser Right eye    OTHER SURGICAL HISTORY  insertion lumbar drain insertion    10/12/`14    OTHER SURGICAL HISTORY  10/22/15    percutaneous lead placement for spinal cord stimulator    OTHER SURGICAL HISTORY  11/3/15    Spinal; cord stimulator    VA AV ANAST,UP ARM BASILIC VEIN TRANSPOSIT Left 5/15/2018    TRANSPOSITION STAGE II AV FISTULA - LEFT UPPER ARM performed by Gricel Farfan MD at [address] [facility] ANGIOACCESS AV FISTULA Left 9/25/2018    SUPERFICIALIZATION AV FISTULA - LEFT ARM performed by Gricel Farfan MD at [address] [place] [address] W/VITRECTOMY ANY METH Left 4/10/2018    PARS PLANA VITRECTOMY 25 GAUGE RETINAL DETACHMENT REPAIR air fluid exchange, endolaser performed by Jil Mansfield MD at [address] [place] [address]  11/15/2017    VITRECTOMY Left 04/10/2018    PARS PLANA VITRECTOMY; RETINAL DETACHMENT REPAIR; GAS BUBBLE; LASER LEFT EYE       Allergies:    Lasix [furosemide]    Social History:   Social History     Socioeconomic History    Marital status: Single     Spouse name: Not on file    Number of children: Not on file    Years of education: Not on file    Highest education level: Not on file   Occupational History    Not on file   Social Needs    Financial resource strain: Not on file    Food insecurity:     Worry: Not on file     Inability: Not on file    Transportation needs:     Medical: Not on file     Non-medical: Not on file   Tobacco Use    Smoking status: Never Smoker    Smokeless tobacco: Never Used   Substance and Sexual Activity    Alcohol use: No    Drug use: No    Sexual activity: Never   Lifestyle    Physical activity:     Days per week: Not on file     Minutes per session: Not on file    Stress: Not on file   Relationships    Social connections:     Talks on phone: Not on file     Gets together: Not on file     Attends Yarsanism service: Not on file     Active member of club or organization: Not on file     Attends meetings of clubs or organizations: Not on file     Relationship status: Not on file    Intimate partner violence:     Fear of current or ex partner: Not on file     Emotionally abused: Not on file     Physically abused: Not on file     Forced sexual activity: Not on file   Other Topics Concern    Not on file   Social History Narrative    Not on file        Family History:       Problem Relation Age of Onset    Breast Cancer Mother 61    Hypertension Mother     Heart Disease Father     Prostate Cancer Father     Breast Cancer Maternal Grandmother 61       Physical Exam:    Vitals: BP (!) 93/57   Pulse 83   Temp 98 °F (36.7 °C) (Temporal)   Resp 16   Ht 5' 10\" (1.778 m)   Wt 202 lb (91.6 kg)   SpO2 95%   BMI 28.98 kg/m²   BP Readings from Last 3 Encounters:   03/03/20 (!) 93/57   02/12/20 (!) 157/80   02/06/20 (!) 98/54     General Appearance: Well developed, awake, alert, oriented, no acute distress  HEENT: OCCLUSIVE DRESSING (SARAN WRAP)  6    bumetanide (BUMEX) 2 MG tablet TAKE 1 TABLET BY MOUTH THREE TIMES A WEEK. 38 tablet 4    COMBIGAN 0.2-0.5 % ophthalmic solution INSTILL ONE DROP INTO THE RIGHT EYE TWICE A DAY  7    camphor-menthol (SARNA) 0.5-0.5 % lotion Apply topically as needed. 1 Bottle 4    insulin lispro (HUMALOG KWIKPEN) 100 UNIT/ML pen Take 2 units > 140 , take 3 units > 170, take 4 units > 200 units 5 pen 5    insulin glargine (LANTUS SOLOSTAR) 100 UNIT/ML injection pen Inject 11 Units into the skin nightly 5 pen 3    blood glucose test strips (ACCU-CHEK ACTIVE STRIPS) strip 1 each by In Vitro route 2 times daily As needed. 100 each 5    Insulin Pen Needle (PEN NEEDLES) 31G X 6 MM MISC 1 each by Does not apply route daily 100 each 5    sevelamer (RENVELA) 800 MG tablet Take 1 tablet by mouth 3 times daily (with meals)      darbepoetin fani-polysorbate (ARANESP) 40 MCG/0.4ML SOLN injection Inject 0.4 mLs into the skin once a week. (Patient taking differently: Inject 40 mcg into the skin every 14 days ) 8.4 mL 0    Bimatoprost (LUMIGAN OP) Place 1 drop into both eyes daily Indications: every evening As directed      VELPHORO 500 MG CHEW CRUSH OR CHEW AND SWALLOW 2 TABLETS 3 TIMES A DAY WITH MEALS      cyanocobalamin 1000 MCG tablet Take 1 tablet by mouth daily. (Patient not taking: Reported on 3/3/2020) 30 tablet 3     No current facility-administered medications for this visit.          Jg Mcwilliams MD

## 2020-03-03 NOTE — PATIENT INSTRUCTIONS
1. Do not take Imdur, Bumex or Percocet the morning of your surgery so your blood pressure isn't too low

## 2020-03-10 RX ORDER — CYANOCOBALAMIN 1000 UG/ML
1000 INJECTION INTRAMUSCULAR; SUBCUTANEOUS ONCE
COMMUNITY
End: 2021-05-19

## 2020-03-10 RX ORDER — GABAPENTIN 100 MG/1
100 CAPSULE ORAL 3 TIMES DAILY
COMMUNITY
End: 2020-03-31 | Stop reason: SDUPTHER

## 2020-03-10 NOTE — PROGRESS NOTES
[facility] PRE-ADMISSION TESTING INSTRUCTIONS    The Preadmission Testing patient is instructed accordingly using the following criteria (check applicable):    ARRIVAL INSTRUCTIONS:  [x] Parking the day of Surgery is located in the Main Entrance lot. Upon entering the door, make an immediate right to the surgery reception desk    [] [phone] is available Monday through Friday 6 am to 6 pm    [x] Bring photo ID and insurance card    [] Bring in a copy of Living will or Durable Power of  papers. [x] Please be sure to arrange for responsible adult to provide transportation to and from the hospital    [x] Please arrange for responsible adult to be with you for the 24 hour period post procedure due to having anesthesia      GENERAL INSTRUCTIONS:    [x] Nothing by mouth after midnight, including gum, candy, mints or water    [x] You may brush your teeth, but do not swallow any water    [x] Take medications as instructed with 1-2 oz of water    [x] Stop herbal supplements and vitamins 5 days prior to procedure    [] Follow preop dosing of blood thinners per physician instructions    [x] Take 1/2 dose of evening insulin, but no insulin after midnight    [x] No oral diabetic medications after midnight    [x] If diabetic and have low blood sugar or feel symptomatic, take 1-2oz apple juice only    [] Bring inhalers day of surgery    [] Bring C-PAP/ Bi-Pap day of surgery    [] Bring urine specimen day of surgery    [x] Shower or bath with soap, lather and rinse well, AM of Surgery, no lotion, powders or creams to surgical site    [] Follow bowel prep as instructed per surgeon    [x] No tobacco products within 24 hours of surgery     [x] No alcohol or illegal drug use within 24 hours of surgery.     [x] Jewelry, body piercing's, eyeglasses, contact lenses and dentures are not permitted into surgery (bring cases)      [x] Please do not wear any nail polish, make up or hair

## 2020-03-16 ENCOUNTER — ANESTHESIA EVENT (OUTPATIENT)
Dept: OPERATING ROOM | Age: 65
End: 2020-03-16
Payer: COMMERCIAL

## 2020-03-17 ENCOUNTER — ANESTHESIA (OUTPATIENT)
Dept: OPERATING ROOM | Age: 65
End: 2020-03-17
Payer: COMMERCIAL

## 2020-03-17 ENCOUNTER — HOSPITAL ENCOUNTER (OUTPATIENT)
Age: 65
Setting detail: OUTPATIENT SURGERY
Discharge: HOME | End: 2020-03-17
Attending: ORTHOPAEDIC SURGERY | Admitting: ORTHOPAEDIC SURGERY
Payer: COMMERCIAL

## 2020-03-17 VITALS
SYSTOLIC BLOOD PRESSURE: 153 MMHG | TEMPERATURE: 97.6 F | BODY MASS INDEX: 27.06 KG/M2 | RESPIRATION RATE: 18 BRPM | OXYGEN SATURATION: 100 % | DIASTOLIC BLOOD PRESSURE: 70 MMHG | HEIGHT: 70 IN | HEART RATE: 71 BPM | WEIGHT: 189 LBS

## 2020-03-17 VITALS — OXYGEN SATURATION: 100 % | SYSTOLIC BLOOD PRESSURE: 103 MMHG | DIASTOLIC BLOOD PRESSURE: 52 MMHG

## 2020-03-17 LAB
METER GLUCOSE: 110 MG/DL (ref 74–99)
POTASSIUM SERPL-SCNC: 4.8 MMOL/L (ref 3.5–5)

## 2020-03-17 PROCEDURE — 3600000012 HC SURGERY LEVEL 2 ADDTL 15MIN: Performed by: ORTHOPAEDIC SURGERY

## 2020-03-17 PROCEDURE — 64721 CARPAL TUNNEL SURGERY: CPT | Performed by: ORTHOPAEDIC SURGERY

## 2020-03-17 PROCEDURE — 6360000002 HC RX W HCPCS: Performed by: NURSE ANESTHETIST, CERTIFIED REGISTERED

## 2020-03-17 PROCEDURE — 3600000002 HC SURGERY LEVEL 2 BASE: Performed by: ORTHOPAEDIC SURGERY

## 2020-03-17 PROCEDURE — 2580000003 HC RX 258: Performed by: PHYSICIAN ASSISTANT

## 2020-03-17 PROCEDURE — 36415 COLL VENOUS BLD VENIPUNCTURE: CPT

## 2020-03-17 PROCEDURE — 6360000002 HC RX W HCPCS: Performed by: PHYSICIAN ASSISTANT

## 2020-03-17 PROCEDURE — 2500000003 HC RX 250 WO HCPCS: Performed by: ORTHOPAEDIC SURGERY

## 2020-03-17 PROCEDURE — 84132 ASSAY OF SERUM POTASSIUM: CPT

## 2020-03-17 PROCEDURE — 82962 GLUCOSE BLOOD TEST: CPT

## 2020-03-17 PROCEDURE — 3700000000 HC ANESTHESIA ATTENDED CARE: Performed by: ORTHOPAEDIC SURGERY

## 2020-03-17 PROCEDURE — 7100000010 HC PHASE II RECOVERY - FIRST 15 MIN: Performed by: ORTHOPAEDIC SURGERY

## 2020-03-17 PROCEDURE — 3700000001 HC ADD 15 MINUTES (ANESTHESIA): Performed by: ORTHOPAEDIC SURGERY

## 2020-03-17 PROCEDURE — 2709999900 HC NON-CHARGEABLE SUPPLY: Performed by: ORTHOPAEDIC SURGERY

## 2020-03-17 PROCEDURE — 7100000011 HC PHASE II RECOVERY - ADDTL 15 MIN: Performed by: ORTHOPAEDIC SURGERY

## 2020-03-17 RX ORDER — PROMETHAZINE HYDROCHLORIDE 25 MG/ML
6.25 INJECTION, SOLUTION INTRAMUSCULAR; INTRAVENOUS
Status: DISCONTINUED | OUTPATIENT
Start: 2020-03-17 | End: 2020-03-17 | Stop reason: HOSPADM

## 2020-03-17 RX ORDER — CEFAZOLIN SODIUM 2 G/50ML
2 SOLUTION INTRAVENOUS
Status: COMPLETED | OUTPATIENT
Start: 2020-03-17 | End: 2020-03-17

## 2020-03-17 RX ORDER — SODIUM CHLORIDE 0.9 % (FLUSH) 0.9 %
10 SYRINGE (ML) INJECTION EVERY 12 HOURS SCHEDULED
Status: DISCONTINUED | OUTPATIENT
Start: 2020-03-17 | End: 2020-03-17 | Stop reason: HOSPADM

## 2020-03-17 RX ORDER — OXYCODONE HYDROCHLORIDE AND ACETAMINOPHEN 5; 325 MG/1; MG/1
1 TABLET ORAL
Status: DISCONTINUED | OUTPATIENT
Start: 2020-03-17 | End: 2020-03-17 | Stop reason: HOSPADM

## 2020-03-17 RX ORDER — SODIUM CHLORIDE 9 MG/ML
INJECTION, SOLUTION INTRAVENOUS CONTINUOUS
Status: DISCONTINUED | OUTPATIENT
Start: 2020-03-17 | End: 2020-03-17 | Stop reason: HOSPADM

## 2020-03-17 RX ORDER — MIDAZOLAM HYDROCHLORIDE 1 MG/ML
INJECTION INTRAMUSCULAR; INTRAVENOUS PRN
Status: DISCONTINUED | OUTPATIENT
Start: 2020-03-17 | End: 2020-03-17 | Stop reason: SDUPTHER

## 2020-03-17 RX ORDER — LIDOCAINE HYDROCHLORIDE AND EPINEPHRINE 10; 10 MG/ML; UG/ML
INJECTION, SOLUTION INFILTRATION; PERINEURAL PRN
Status: DISCONTINUED | OUTPATIENT
Start: 2020-03-17 | End: 2020-03-17 | Stop reason: ALTCHOICE

## 2020-03-17 RX ORDER — SODIUM CHLORIDE 0.9 % (FLUSH) 0.9 %
10 SYRINGE (ML) INJECTION PRN
Status: DISCONTINUED | OUTPATIENT
Start: 2020-03-17 | End: 2020-03-17 | Stop reason: HOSPADM

## 2020-03-17 RX ORDER — FENTANYL CITRATE 50 UG/ML
25 INJECTION, SOLUTION INTRAMUSCULAR; INTRAVENOUS EVERY 5 MIN PRN
Status: DISCONTINUED | OUTPATIENT
Start: 2020-03-17 | End: 2020-03-17 | Stop reason: HOSPADM

## 2020-03-17 RX ORDER — MEPERIDINE HYDROCHLORIDE 25 MG/ML
12.5 INJECTION INTRAMUSCULAR; INTRAVENOUS; SUBCUTANEOUS EVERY 5 MIN PRN
Status: DISCONTINUED | OUTPATIENT
Start: 2020-03-17 | End: 2020-03-17 | Stop reason: HOSPADM

## 2020-03-17 RX ORDER — DIPHENHYDRAMINE HYDROCHLORIDE 50 MG/ML
12.5 INJECTION INTRAMUSCULAR; INTRAVENOUS
Status: DISCONTINUED | OUTPATIENT
Start: 2020-03-17 | End: 2020-03-17 | Stop reason: HOSPADM

## 2020-03-17 RX ORDER — FENTANYL CITRATE 50 UG/ML
INJECTION, SOLUTION INTRAMUSCULAR; INTRAVENOUS PRN
Status: DISCONTINUED | OUTPATIENT
Start: 2020-03-17 | End: 2020-03-17 | Stop reason: SDUPTHER

## 2020-03-17 RX ORDER — PROPOFOL 10 MG/ML
INJECTION, EMULSION INTRAVENOUS CONTINUOUS PRN
Status: DISCONTINUED | OUTPATIENT
Start: 2020-03-17 | End: 2020-03-17 | Stop reason: SDUPTHER

## 2020-03-17 RX ORDER — FENTANYL CITRATE 50 UG/ML
50 INJECTION, SOLUTION INTRAMUSCULAR; INTRAVENOUS EVERY 5 MIN PRN
Status: DISCONTINUED | OUTPATIENT
Start: 2020-03-17 | End: 2020-03-17 | Stop reason: HOSPADM

## 2020-03-17 RX ADMIN — PROPOFOL 50 MCG/KG/MIN: 10 INJECTION, EMULSION INTRAVENOUS at 07:53

## 2020-03-17 RX ADMIN — MIDAZOLAM 1 MG: 1 INJECTION INTRAMUSCULAR; INTRAVENOUS at 07:49

## 2020-03-17 RX ADMIN — SODIUM CHLORIDE: 9 INJECTION, SOLUTION INTRAVENOUS at 07:49

## 2020-03-17 RX ADMIN — FENTANYL CITRATE 50 MCG: 50 INJECTION, SOLUTION INTRAMUSCULAR; INTRAVENOUS at 08:06

## 2020-03-17 RX ADMIN — MIDAZOLAM 1 MG: 1 INJECTION INTRAMUSCULAR; INTRAVENOUS at 07:53

## 2020-03-17 RX ADMIN — CEFAZOLIN SODIUM 2 G: 2 SOLUTION INTRAVENOUS at 07:49

## 2020-03-17 RX ADMIN — FENTANYL CITRATE 50 MCG: 50 INJECTION, SOLUTION INTRAMUSCULAR; INTRAVENOUS at 07:53

## 2020-03-17 ASSESSMENT — PAIN DESCRIPTION - LOCATION
LOCATION: WRIST
LOCATION: WRIST

## 2020-03-17 ASSESSMENT — PULMONARY FUNCTION TESTS
PIF_VALUE: 1
PIF_VALUE: 0
PIF_VALUE: 1

## 2020-03-17 ASSESSMENT — PAIN DESCRIPTION - ORIENTATION
ORIENTATION: LEFT
ORIENTATION: LEFT

## 2020-03-17 ASSESSMENT — PAIN SCALES - GENERAL
PAINLEVEL_OUTOF10: 0

## 2020-03-17 ASSESSMENT — PAIN DESCRIPTION - PAIN TYPE
TYPE: SURGICAL PAIN
TYPE: SURGICAL PAIN

## 2020-03-17 NOTE — H&P
Department of Orthopedic Surgery  History and Physical        CHIEF COMPLAINT: Bilateral hand numbness and tingling along with pain     HISTORY OF PRESENT ILLNESS:                 The patient is a RHD 60 y.o. female who presents with bilateral hand numbness and tingling along with pain. Patient states for the last 2 to 3 years she has had increased pain along with numbness and tingling to bilateral hands. She reports nocturnal symptoms every night of pain along with numbness and tingling. She states her numbness is constant to all of her fingers. She has difficulty tying shoes and doing normal daily activities. She does have a fistula in the left upper extremity for dialysis. She also has a history of breast cancer and has lymphedema to her right upper extremity with axillary nodes removed previously.     She did have an EMG and nerve conduction study test dated May 16 of 2019. Right median nerve motor latency was 5.83 and left was 9.06.  Right ulnar nerve motor velocity was 30 below the elbow and 23 above the elbow left ulnar nerve motor velocity was 44 below the elbow and 43 above the elbow. She had non-recordable right and left median nerve sensory latencies. On the EMG portion of the exam she had multiple changes present. This is consistent with bilateral severe carpal tunnel syndrome with denervation of bilateral APBs. There is evidence of right ulnar neuropathy and multilevel chronic cervical motor radiculopathy bilaterally involving C6-C8. There is also evidence of chronic moderately severe peripheral polyneuropathy.     Past Medical History:    Past Medical History             Diagnosis Date    Acute infection of bone ([address]       infection of rt foot, resolved.     Anemia of chronic disease      Breast cancer ([address]       right breast, 2008/ bladder, 2006    CAD (coronary artery disease)      Chronic diastolic CHF (congestive heart failure) ([address] 09/23/2014 9/23/14- echocardiogram revealed moderate LV concentric hypertrophy, stage III diastolic dysfunction, mild tricuspid regurgitation    CKD (chronic kidney disease) stage 4, GFR 15-29 ml/min (HCC)      Diabetic retinopathy ([address]      Glaucoma      Hemodialysis patient ([address]       [facility]  mon wed fri    Hyperkalemia, diminished renal excretion 11/9/2017    Hypertension      Hypoglycemia unawareness in type 1 diabetes mellitus ([address] 11/7/2017    Insulin dependent type 2 diabetes mellitus ([address]      Neuropathy       feet    Osteomyelitis due to secondary diabetes ([address]       rt great toe with amputation    Patient is Sikhism 11/7/2017    Refusal of blood product       patient states she dose not take blood transfusion    Ventricular hypertrophy      Vitreous hemorrhage (HCC)       left eye         Past Surgical History:    Past Surgical History             Procedure Laterality Date    AMPUTATION         right great toe    ANKLE SURGERY         correction on charcot joint of right ankle    CATARACT REMOVAL         bilateral    CHOLECYSTECTOMY        CYSTOSCOPY        DIALYSIS FISTULA CREATION Left 01/31/2018     upper arm/Dr. Susu Almanza    ECHO COMPL W DOP COLOR FLOW   2/14/2013          ECHO COMPLETE   9/17/2013          MASTECTOMY         right    OTHER SURGICAL HISTORY   9/27/2011     PPV, membranectomy, laser Right eye    OTHER SURGICAL HISTORY   insertion lumbar drain insertion     10/12/`14    OTHER SURGICAL HISTORY   10/22/15     percutaneous lead placement for spinal cord stimulator    OTHER SURGICAL HISTORY   11/3/15     Spinal; cord stimulator    CA AV ANAST,UP ARM BASILIC VEIN TRANSPOSIT Left 5/15/2018     TRANSPOSITION STAGE II AV FISTULA - LEFT UPPER ARM performed by Jag Verdugo MD at [address] [facility] ANGIOACCESS AV FISTULA Left 9/25/2018     SUPERFICIALIZATION AV FISTULA - LEFT ARM performed by Jag Verdugo MD at [address] METH Left 4/10/2018     symmetrical, trachea midline, no adenopathy, thyroid symmetric, not enlarged and no tenderness, skin normal  LUNGS:  CTA  CARDIOVASCULAR:  2+ radial pulses, extremities warm and well perfused  ABDOMEN:  obese, NTTP  CHEST:  Atraumatic   GENITAL/URINARY:  deferred  NEUROLOGIC:  Awake, alert, oriented to name, place and time. Cranial nerves II-XII are grossly intact. Motor is 5 out of 5 bilaterally. Sensory is intact.  gait is normal.  MUSCULOSKELETAL:     Right upper extremity: Lymphedema throughout the extremity. Nontender at the elbow with negative Tinel's of the cubital tunnel, positive Tinel's of the carpal tunnel, positive Brissa's, negative Finkelstein's, negative CMC grind, negative tenderness over the A1 pulleys with no active triggering. Altered sensation to the distal aspects of the thumb, index, middle, ring and small fingers. Negative Wartenberg's and cross finger testing. APB with no function. Brisk capillary refill. Otherwise neurovascular intact.     Left upper extremity: Fistula noted in the upper arm. Nontender at the elbow with negative Tinel's of the cubital tunnel, positive Tinel's of the carpal tunnel, positive Brissa's, negative Finkelstein's, negative CMC grind, negative tenderness over the A1 pulleys with no active triggering. Altered sensation to the distal aspects of the thumb, index, middle, ring and small fingers. Negative Wartenberg's and cross finger testing. APB strength 4/5. Brisk capillary refill.   Otherwise neurovascular intact.     DATA:    CBC:         Lab Results   Component Value Date     WBC 4.2 09/25/2018     RBC 3.71 09/25/2018     HGB 10.4 09/25/2018     HCT 34.2 09/25/2018     MCV 92.2 09/25/2018     MCH 28.0 09/25/2018     MCHC 30.4 09/25/2018     RDW 15.2 09/25/2018      09/25/2018     MPV 10.7 09/25/2018      PT/INR:          Lab Results   Component Value Date     PROTIME 10.5 09/25/2018     PROTIME 10.4 02/15/2012     INR 0.9 09/25/2018       Radiology Review: X-rays of bilateral hands were obtained today in the office and reviewed with patient. 4 views: AP, lateral, oblique, carpal tunnel view demonstrate no acute fracture dislocation. Bilateral thumb CMC joint arthritis. Impression: Bilateral thumb CMC joint arthritis     IMPRESSION:  · Bilateral CTS  · Right ulnar neuropathy  · Peripheral polyneuropathy  · Cervical radiculopathy     PLAN:  Discussed findings with patient. Discussed conservative and surgical management with the patient. Recommended a carpal tunnel release. We will plan for a left carpal tunnel release. This will be followed by a right carpal tunnel release with tendon transfer to improve thumb function. Postoperative course explained to the patient. Patient like to proceed. All questions answered.       We will plan for a splint following each surgery due to the patient's use of a walker. We will also plan for no use of a tourniquet with either surgery. All questions answered.     I explained the risks, benefits, alternatives and complications of surgery with the patient including but not limited to the risks of infection, possible damage to nerves, vessels, or tendons, stiffness, loss of range of motion, scar sensitivity, wound healing complications, worsening symptoms, possible need for therapy, as well as the possible need further surgery and unanticipated complications. The patient voiced understanding and all questions were answered. The patient elected to proceed with surgical intervention.      History and Physical Update     Patient was seen and examined. Patient's history and physical was reviewed with the patient. There has been no significant interval changes.       Electronically signed by Lisa Vences MD on 3/17/2020 at 7:27 AM

## 2020-03-17 NOTE — ANESTHESIA PRE PROCEDURE
dextrose 3 % 50 mL IVPB (duplex)  2 g Intravenous On Call to [address]        sodium chloride flush 0.9 % injection 10 mL  10 mL Intravenous 2 times per day BRIAN Eaton        sodium chloride flush 0.9 % injection 10 mL  10 mL Intravenous PRN BRIAN Eaton           Allergies:     Allergies   Allergen Reactions    Lasix [Furosemide] Other (See Comments)     States her kidneys shut down       Problem List:    Patient Active Problem List   Diagnosis Code    Diabetic retinopathy ([facility] E11.319    Malignant neoplasm of right female breast ([facility] C50.911    Atherosclerosis of native coronary artery of native heart without angina pectoris I25.10    Moderate obesity E66.8    Left ventricular hypertrophy I51.7    Herniated lumbar intervertebral disc M51.26    Lumbar degenerative disc disease M51.36    Pseudomeningocele G96.19    Lumbar radiculopathy M54.16    Lymphedema of arm I89.0    CKD (chronic kidney disease) stage 4, GFR 15-29 ml/min ([facility]) N18.4    Insulin dependent type 2 diabetes mellitus ([facility]) E11.9, Z79.4    Anemia of chronic disease D63.8    Chronic diastolic CHF (congestive heart failure) ([facility]) I50.32    Neuropathy G62.9    Hypertension I10    Glaucoma H40.9    Refusal of blood product Z78.9    Pancytopenia ([facility] D61.818    Controlled type 2 diabetes mellitus with chronic kidney disease on chronic dialysis, with long-term current use of insulin ([facility]) E11.22, N18.6, Z79.4, Z99.2    Vitreous hemorrhage ([facility] H43.10    Patient is Anglican Z78.9    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 ([facility] E11.649    Hyperkalemia, diminished renal excretion E87.5    Chronic pain syndrome G89.4    Lumbar post-laminectomy syndrome M96.1    Myalgia M79.10    Cervicalgia M54.2    Diabetic peripheral neuropathy ([facility]) E11.42    ESRD (end stage renal disease) ([facility]) N18.6    Bilateral carpal tunnel syndrome G56.03    Spinal stenosis of lumbar placement for spinal cord stimulator    OTHER SURGICAL HISTORY  11/03/2015    Spinal; cord stimulator- turned off as of 3-10-20     KS AV ANAST,UP ARM BASILIC VEIN TRANSPOSIT Left 5/15/2018    TRANSPOSITION STAGE II AV FISTULA - LEFT UPPER ARM performed by Marcell Obregon MD at [address] [facility] ANGIOACCESS AV FISTULA Left 9/25/2018    SUPERFICIALIZATION AV FISTULA - LEFT ARM performed by Marcell Obregon MD at [address] [facility] W/VITRECTOMY ANY METH Left 4/10/2018    PARS PLANA VITRECTOMY 25 GAUGE RETINAL DETACHMENT REPAIR air fluid exchange, endolaser performed by Deangelo Morales MD at [address] [place] [address] TUNNELED VENOUS CATHETER PLACEMENT  11/15/2017    VITRECTOMY Left 04/10/2018    PARS PLANA VITRECTOMY; RETINAL DETACHMENT REPAIR; GAS BUBBLE; LASER LEFT EYE       Social History:    Social History     Tobacco Use    Smoking status: Never Smoker    Smokeless tobacco: Never Used   Substance Use Topics    Alcohol use: No                                Counseling given: Not Answered      Vital Signs (Current):   Vitals:    03/10/20 1426 03/17/20 0719   BP:  (!) 141/72   Pulse:  76   Resp:  18   Temp:  97.9 °F (36.6 °C)   TempSrc:  Temporal   SpO2:  91%   Weight: 189 lb (85.7 kg)    Height: 5' 10\" (1.778 m)                                               BP Readings from Last 3 Encounters:   03/17/20 (!) 141/72   03/03/20 (!) 93/57   02/12/20 (!) 157/80       NPO Status: Time of last liquid consumption: 2200                        Time of last solid consumption: 2200                        Date of last liquid consumption: 03/16/20                        Date of last solid food consumption: 03/16/20    BMI:   Wt Readings from Last 3 Encounters:   03/10/20 189 lb (85.7 kg)   03/03/20 202 lb (91.6 kg)   02/12/20 189 lb (85.7 kg)     Body mass index is 27.12 kg/m².     CBC:   Lab Results   Component Value Date    WBC 4.6 03/03/2020    RBC 4.42 03/03/2020    HGB 11.9

## 2020-03-17 NOTE — BRIEF OP NOTE
Brief Postoperative Note  ______________________________________________________________    Patient: Dagoberto Esetban  YOB: 1955  MRN: [id number]  Date of Procedure: 3/17/2020    Pre-Op Diagnosis: LEFT CARPAL TUNNEL SYNDROME    Post-Op Diagnosis: Same       Procedure(s):  LEFT CARPAL TUNNEL RELEASE    Anesthesia: Monitor Anesthesia Care    Surgeon(s):  Jadon Cueva MD    Assistant: Natalie Corona    Estimated Blood Loss (mL): less than 50     Complications: None    Specimens:   * No specimens in log *    Implants:  * No implants in log *      Drains: * No LDAs found *    Findings: see op note    Odie Heimlich, PA  Date: 3/17/2020  Time: 7:58 AM

## 2020-03-17 NOTE — OP NOTE
well-padded arm tourniquet was placed. The left upperextremity was prepared and draped in standard sterile fashion. An incision in line with the radial border of the ring finger, extending from  Garrido's cardinal line to within 1 cm of the volar wrist flexion crease was  then created followed by blunt dissection and identification of the  superficial palmar fascia, underlying median nerve, and transverse carpal  ligament. Dissection was performed proximally to identify the contents of  Guyon canal, which was reflected off of the distal forearm fascia and  transverse carpal ligament. The transverse carpal ligament was then clearly  identified and released from a proximal to distal direction, decompressing  the carpal tunnel. The median nerve was inspected. There was flattening and  hyperemia to the nerve, consistent with median nerve compression. The  remaining portion of the proximal transverse carpal ligament and the distal  forearm fascia was then released using blunt-tip Metzenbaum scissors with a  distal to proximal slide technique while visualizing and protecting the  underlying median nerve. The median nerve was fully decompressed  throughout its visualized course, including the distal forearm fascia,  through the carpal tunnel and to its level of trifurcation distally. The wound was copiously irrigated out. The skin was closed with nylon suture followed by a soft sterile dressing and volar wrist splint.     Electronically signed by Agus Hung MD on 3/17/2020 at 8:14 AM

## 2020-03-17 NOTE — PROGRESS NOTES
2006 admitted to stage 2 pacu left wrist dressing  Dry and intact and is elevated in judd pillow family here in room and pt taking po well  0930 discharge instructions given to pt and her family member and they verbalized understanding of instructions

## 2020-03-18 NOTE — ANESTHESIA POSTPROCEDURE EVALUATION
Department of Anesthesiology  Postprocedure Note    Patient: August Joleen  MRN: [id number]  YOB: 1955  Date of evaluation: 3/17/2020  Time:  9:40 PM     Procedure Summary     Date:  03/17/20 Room / Location:  [facility] OR 01 / [facility]    Anesthesia Start:  2966 Anesthesia Stop:  2276    Procedure:  LEFT CARPAL TUNNEL RELEASE (Left Wrist) Diagnosis:  (LEFT CARPAL TUNNEL SYNDROME)    Surgeon:  Agus Hung MD Responsible Provider:  Geovanna Harris MD    Anesthesia Type:  MAC ASA Status:  4          Anesthesia Type: MAC    Nikki Phase I: Nikki Score: 10    Nikki Phase II: Nikki Score: 10    Last vitals: Reviewed and per EMR flowsheets.        Anesthesia Post Evaluation    Patient location during evaluation: PACU  Patient participation: complete - patient participated  Level of consciousness: awake  Airway patency: patent  Nausea & Vomiting: no vomiting and no nausea  Complications: no  Cardiovascular status: hemodynamically stable  Respiratory status: acceptable  Hydration status: stable

## 2020-03-23 RX ORDER — ALLOPURINOL 100 MG/1
TABLET ORAL
Qty: 30 TABLET | Refills: 5 | Status: SHIPPED
Start: 2020-03-23 | End: 2020-06-26

## 2020-03-24 ENCOUNTER — TELEPHONE (OUTPATIENT)
Dept: PAIN MANAGEMENT | Age: 65
End: 2020-03-24

## 2020-03-24 NOTE — TELEPHONE ENCOUNTER
Pt. Notified that she will receive a phone call at her scheduled time  from [facility] since the [place] office is closed due to the coronavirus.

## 2020-03-26 ENCOUNTER — OFFICE VISIT (OUTPATIENT)
Dept: ORTHOPEDIC SURGERY | Age: 65
End: 2020-03-26
Payer: COMMERCIAL

## 2020-03-26 VITALS
SYSTOLIC BLOOD PRESSURE: 126 MMHG | TEMPERATURE: 98.2 F | RESPIRATION RATE: 18 BRPM | DIASTOLIC BLOOD PRESSURE: 70 MMHG | HEART RATE: 76 BPM

## 2020-03-26 PROCEDURE — 3017F COLORECTAL CA SCREEN DOC REV: CPT | Performed by: ORTHOPAEDIC SURGERY

## 2020-03-26 PROCEDURE — G9899 SCRN MAM PERF RSLTS DOC: HCPCS | Performed by: ORTHOPAEDIC SURGERY

## 2020-03-26 PROCEDURE — G8419 CALC BMI OUT NRM PARAM NOF/U: HCPCS | Performed by: ORTHOPAEDIC SURGERY

## 2020-03-26 PROCEDURE — 99214 OFFICE O/P EST MOD 30 MIN: CPT | Performed by: ORTHOPAEDIC SURGERY

## 2020-03-26 PROCEDURE — 1036F TOBACCO NON-USER: CPT | Performed by: ORTHOPAEDIC SURGERY

## 2020-03-26 PROCEDURE — G8484 FLU IMMUNIZE NO ADMIN: HCPCS | Performed by: ORTHOPAEDIC SURGERY

## 2020-03-26 PROCEDURE — G8427 DOCREV CUR MEDS BY ELIG CLIN: HCPCS | Performed by: ORTHOPAEDIC SURGERY

## 2020-03-26 NOTE — PROGRESS NOTES
chronic disease     Breast cancer ([address]     right breast, 2008/ bladder, 2006- last chemotherapy \"years ago\"    CAD (coronary artery disease)     Carpal tunnel syndrome     bilat - for OR left hand 3-17-20     Chronic diastolic CHF (congestive heart failure) ([address] 09/23/2014 9/23/14- echocardiogram revealed moderate LV concentric hypertrophy, stage III diastolic dysfunction, mild tricuspid regurgitation    CKD (chronic kidney disease) stage 4, GFR 15-29 ml/min ([facility])     Diabetic retinopathy ([address]     Glaucoma     Hemodialysis patient ([address]     [facility]  mon wed fri- Dr. Ollie Tesfaye - left arm fistula     Hyperkalemia, diminished renal excretion 11/9/2017    Hypertension     Hypoglycemia unawareness in type 1 diabetes mellitus ([address] 11/7/2017    Insulin dependent type 2 diabetes mellitus ([address]     Neuropathy     feet    Osteomyelitis due to secondary diabetes ([address]     rt great toe with amputation    Patient is Christian 11/7/2017    Refusal of blood product     patient states she dose not take blood transfusion    Ventricular hypertrophy     Vitreous hemorrhage ([address]     left eye     Past Surgical History:        Procedure Laterality Date    AMPUTATION      right great toe    ANKLE SURGERY      correction on charcot joint of right ankle    CARPAL TUNNEL RELEASE Left 3/17/2020    LEFT CARPAL TUNNEL RELEASE performed by Maria Guadalupe Irizarry MD at [facility]      bilateral    CHOLECYSTECTOMY      COLONOSCOPY      CYSTOSCOPY      DIALYSIS FISTULA CREATION Left 01/31/2018    upper arm/Dr. Fior Macdonald    ECHO COMPL W DOP COLOR FLOW  2/14/2013         ECHO COMPLETE  9/17/2013         MASTECTOMY      right    OTHER SURGICAL HISTORY  9/27/2011    PPV, membranectomy, laser Right eye    OTHER SURGICAL HISTORY  insertion lumbar drain insertion    10/12/`14    OTHER SURGICAL HISTORY  10/22/15    percutaneous lead placement for spinal cord stimulator    OTHER SURGICAL HISTORY 11/03/2015    Spinal; cord stimulator- turned off as of 3-10-20     [place] AV ANAST,UP ARM BASILIC VEIN TRANSPOSIT Left 5/15/2018    TRANSPOSITION STAGE II AV FISTULA - LEFT UPPER ARM performed by Rosita Claros MD at [address] [facility] ANGIOACCESS AV FISTULA Left 9/25/2018    SUPERFICIALIZATION AV FISTULA - LEFT ARM performed by Rosita Claros MD at [address] [facility] W/VITRECTOMY ANY METH Left 4/10/2018    PARS PLANA VITRECTOMY 25 GAUGE RETINAL DETACHMENT REPAIR air fluid exchange, endolaser performed by Emmanuel Mcbride MD at [address] TUNNELED VENOUS CATHETER PLACEMENT  11/15/2017    VITRECTOMY Left 04/10/2018    PARS PLANA VITRECTOMY; RETINAL DETACHMENT REPAIR; GAS BUBBLE; LASER LEFT EYE     Current Medications:   No current facility-administered medications for this visit. Allergies:  Lasix [furosemide]    Social History:   TOBACCO:   reports that she has never smoked. She has never used smokeless tobacco.  ETOH:   reports no history of alcohol use. DRUGS:   reports no history of drug use.   ACTIVITIES OF DAILY LIVING:    OCCUPATION:    Family History:       Problem Relation Age of Onset    Breast Cancer Mother 61    Hypertension Mother     Heart Disease Father     Prostate Cancer Father     Breast Cancer Maternal Grandmother 61       REVIEW OF SYSTEMS:  CONSTITUTIONAL:  negative  EYES:  negative  HEENT:  negative  RESPIRATORY:  negative  CARDIOVASCULAR:  negative  GASTROINTESTINAL:  negative  GENITOURINARY:  CKD  INTEGUMENT/BREAST:  negative  HEMATOLOGIC/LYMPHATIC:  negative  ALLERGIC/IMMUNOLOGIC:  negative  ENDOCRINE:  DM  MUSCULOSKELETAL:  pain  NEUROLOGICAL:  N/T  BEHAVIOR/PSYCH:  negative    PHYSICAL EXAM:    VITALS:  /70 (Site: Left Upper Arm, Position: Sitting, Cuff Size: Medium Adult)   Pulse 76   Temp 98.2 °F (36.8 °C) (Oral)   Resp 18   CONSTITUTIONAL:  awake, alert, cooperative, no apparent distress, and appears stated age  EYES: Lids and lashes normal, pupils equal, round and reactive to light, extra ocular muscles intact, sclera clear, conjunctiva normal  ENT:  Normocephalic, without obvious abnormality, atraumatic, sinuses nontender on palpation, external ears without lesions, oral pharynx with moist mucus membranes, tonsils without erythema or exudates, gums normal and good dentition. NECK:  Supple, symmetrical, trachea midline, no adenopathy, thyroid symmetric, not enlarged and no tenderness, skin normal  LUNGS:  CTA  CARDIOVASCULAR:  2+ radial pulses, extremities warm and well perfused  ABDOMEN:  obese, NTTP  CHEST:  Atraumatic   GENITAL/URINARY:  deferred  NEUROLOGIC:  Awake, alert, oriented to name, place and time. Cranial nerves II-XII are grossly intact. Motor is 5 out of 5 bilaterally. Sensory is intact.  gait is normal.  MUSCULOSKELETAL:    Right upper extremity: Lymphedema throughout the extremity. Nontender at the elbow with negative Tinel's of the cubital tunnel, positive Tinel's of the carpal tunnel, positive Brissa's, negative Finkelstein's, negative CMC grind, negative tenderness over the A1 pulleys with no active triggering. Altered sensation to the distal aspects of the thumb, index, middle, ring and small fingers. Negative Wartenberg's and cross finger testing. APB with no function. Brisk capillary refill. Otherwise neurovascular intact. Left upper extremity: Fistula noted in the upper arm. Palpable thrill noted in fistula. Nontender at the elbow with negative Tinel's of the cubital tunnel. Incision well approximated at the carpal tunnel. Palpable 1+ radial pulse. There is brisk capillary refill of the thumb index middle ring and small fingers. No ischemia appreciated. Sensation intact light touch radial, ulnar, median nerve distributions.     DATA:    CBC:   Lab Results   Component Value Date    WBC 4.6 03/03/2020    RBC 4.42 03/03/2020    HGB 11.9 03/03/2020    HCT 40.8 03/03/2020    MCV 92.3

## 2020-03-31 ENCOUNTER — VIRTUAL VISIT (OUTPATIENT)
Dept: PAIN MANAGEMENT | Age: 65
End: 2020-03-31
Payer: COMMERCIAL

## 2020-03-31 ENCOUNTER — VIRTUAL VISIT (OUTPATIENT)
Dept: PAIN MANAGEMENT | Age: 65
End: 2020-03-31

## 2020-03-31 PROCEDURE — 99442 PR PHYS/QHP TELEPHONE EVALUATION 11-20 MIN: CPT | Performed by: PHYSICIAN ASSISTANT

## 2020-03-31 RX ORDER — GABAPENTIN 100 MG/1
100 CAPSULE ORAL 3 TIMES DAILY
Qty: 90 CAPSULE | Refills: 1 | Status: SHIPPED
Start: 2020-04-21 | End: 2020-05-05 | Stop reason: SDUPTHER

## 2020-03-31 RX ORDER — OXYCODONE HYDROCHLORIDE AND ACETAMINOPHEN 5; 325 MG/1; MG/1
1 TABLET ORAL DAILY
Qty: 30 TABLET | Refills: 0 | Status: SHIPPED
Start: 2020-04-15 | End: 2020-03-31 | Stop reason: SDUPTHER

## 2020-03-31 RX ORDER — OXYCODONE HYDROCHLORIDE AND ACETAMINOPHEN 5; 325 MG/1; MG/1
1 TABLET ORAL DAILY
Qty: 30 TABLET | Refills: 0 | Status: SHIPPED | OUTPATIENT
Start: 2020-05-15 | End: 2020-06-14

## 2020-03-31 NOTE — PROGRESS NOTES
[address]  [address] [facility], [address] [place] [address] [place]  [phone]    Tele health follow up Note      Chapito Samples     Date of Visit:  3/31/2020    CC:  Tele health follow up No chief complaint on file. Consent:  The patient and/or health care decision maker is aware that that he may receive a bill for this tele-health service D[facility], depending on his insurance coverage, and has provided verbal consent to proceed: Yes  I have considered the risks of abuse, dependence, addiction and diversion. My patient is aware that they will need a follow-up visit (in-person or virtually) at the appropriate time indicated for continued medications. Further, my patient is aware that when this acute crisis has lifted, they will be expected to return for an in-person visit and all elements of standard local and hospital guidelines in order to continue this medication. HPI:    Pain is unchanged. No new changes with her lower back. Left hand tingling doing better after surgery 2 weeks ago. Appropriate analgesia with current medications regimen: yes. Change in quality of symptoms:no. Medication side effects:none. Recent diagnostic testing:none. Recent interventional procedures:none. She has been on anticoagulation medications to include heparin through dialysis. The patient  has not been on herbal supplements. The patient is diabetic.     Imaging:   Cervical xray 8/2018 -   Alignment of the vertebral bodies appears to be near-anatomic   No fracture or foreign body is identified. The disc spaces demonstrate severe degenerative changes. There is no significant loss of the vertebral body height   The are severe degenerative changes involving the facets.      CT lumbar 9/2014 (pre-surgical) -   IMPRESSION:    1. Postsurgical changes, status post laminectomy at L3-L4 and   L4-L5 and fusion of L3-L4 and L5.   2. No evidence for recurrent disc herniation.  No dural or epidural   abscess or hematoma. 3. Pedicle screws are in good position. Alignment is satisfactory.      Lumbar myelogram 2014 -   IMPRESSION:   1. Severe spinal canal stenosis in L4-L5 level and moderate to   severe in L3-L4 level as described above.       2. Distraction of the joint space of the facet joints of L4-L5   with hypertrophy which explains the anterolisthesis of L4 in   relation to L5.       3. Degenerative disc disease predominant seen in the L4-L5 level.       4. Encroachment of the neural foramina predominant in the level of   L4-L5 bilaterally.      2015 EMG/NCT:     IMPRESSION:  Electrodiagnostic examination of both arms and both legs disclosed evidence  diagnostic of the followin. 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 Such findings are seen in disorders  as diabetes mellitus, hypothyroidism, nutritional deficiencies and toxic  Neuropathies.     2. Left median neuropathy at/or distal to the wrist of minimal severity. These findings were consistent with carpal tunnel syndrome. This disorder  is more commonly seen in underlying peripheral neuropathic disease.     3. Acute and chronic denervation changes in the proximal muscles of the  right leg suggesting proximal motor neuropathy as in diabetic amyotrophy  versus possible L3 and L4 motor radiculopathies. These motor radiculopathies  could not be proven due to difficulty performing needle examination of the  lumbar paraspinal muscles.     There were no other peripheral neuropathies. There were no other motor  radiculopathies. Sensory radiculopathies cannot be evaluated by  electrodiagnostic means. The patient's sudden onset of difficulties in her right thigh was clinically  consistent with diabetic amyotrophy. However, MR imaging  studies of the lumbosacral spine were recommended to rule out compressive  radicular disease in the upper lumbar nerve roots.  Clinical correlation was  highly advised.     Potential Aberrant Drug-Related Behavior:  no     Urine Drug Screenin2018 consistent  2019 consistent  2019 consistent   2019 consistent     OARRS report:  2018 consistent to 2020 consistent      Past Medical History: Reviewed    Past Surgical History: Reviewed     Home Medications: Reviewed    Allergies: Reviewed     Social History: Reviewed     REVIEW OF SYSTEMS:     Nerissa Sahu denies fever/chills, chest pain, shortness of breath, new bowel or bladder complaints. All other review of systems was negative. PHYSICAL EXAMINATION:      General:       A & O x3  Build:Normal Weight    HEENT:    Head:normocephalic and atraumatic  Pupils:regular, round and equal.  Sclera: icterus absent,     Lungs:    Breathing: No breathing abnormalities noted. Lumbar spine:    Spine inspection:normal   Range of motion:abnormal mildly Lateral bending, flexion, extension rotation bilateral and is not painful. Extremities:    Tremors:None bilaterally upper and lower  Range of motion:Generally normal shoulders, pain with internal rotation of hips not done. Intact:Yes    Neurological:     Motor:          No apparent weakness per patient       Gait: Not observed. Dermatology:    Skin:no unusual rashes, no skin lesions, no palpable subcutaneous nodules and good skin turgor. Brace on left wrist from recent surgery.       Impression:    LBP, b/l LE pain, prior PLIF L3-4, L4-5 on 14  with Dr. Cosme Heredia, s/p [employer] SCS implant  but does not feel it is working properly at this time and is considering removal  Left hand pain due to complications related to her AV fistula (CKD on dialysis),  had arterial banding surgery and is noticing some improvement in symptoms from the surgery and the addition of gabapentin  B/l hand pain due to CTS - pending surgery with Dr. Lon Mchugh  Right trapezius/shoulder girdle pain  Hx right breast CA s/p mastectomy, chemo, and radiation, with typical post-radiation skin changes and controlled lymphedema RUE  + DM, controlled (11/2019 HwR1d=1.9), CHF, HTN     Had second opinion with Dr. Eusebio Bishop who suggests updated imaging if she would like to consider surgery     OARRS report reviewed . Continue percocet 5/325 QD prn #30 - ordered for 04/15/2020 and 05/15/2020. Continue gabapentin 100 mg TID - refilled 30 day supply with 1 RF. She is doing well at this dose without fatigue and drowsiness. Now taking 1 at HS and 1 after dialysis to help with burning. Patient is s/p right shoulder CSI on 11/14/2019 by Dr. Shellie Diego with minimal relief. Failed TENS  Continue compounding pain cream.  Needs refill. Will have staff call [facility]. Patient is s/p:  Left CTR on 03/17/2020. Doing much better. Patient encouraged to stay active  Treatment plan discussed with the patient including medication side effects     Will see every 2 months as she is stable and consistent. Controlled Substance Monitoring:    Acute and Chronic Pain Monitoring:   RX Monitoring 3/31/2020   Attestation -   Periodic Controlled Substance Monitoring Possible medication side effects, risk of tolerance/dependence & alternative treatments discussed. ;No signs of potential drug abuse or diversion identified. ;Assessed functional status. ;Obtaining appropriate analgesic effect of treatment. Chronic Pain > 80 MEDD -       We discussed with the patient that combining opioids, benzodiazepines, alcohol, illicit drugs or sleep aids increases the risk of respiratory depression including death. We discussed that these medications may cause drowsiness, sedation or dizziness and have counseled the patient not to drive or operate machinery. We have discussed that these medications will be prescribed only by one provider.  We have discussed with the patient about age related risk factors and have thoroughly discussed the importance of taking these medications as prescribed. The patient verbalizes understanding. Patient advised regarding steps to help prevent the spread of COVID-19   SOURCE - https://cordelia-solitario.info/. html     1-Stay home except to get medical care  2-Clean your hands often for atleast 20 seconds, avoid touching: Avoid touching your eyes, nose, and mouth with unwashed hands. 3-Seek medical attention: Seek prompt medical attention if your illness is worsening (e.g., difficulty breathing). Call you doctor first.  3-Wear a facemask if you are sick   4-Cover your coughs and sneezes           I affirm this is a Patient Initiated Episode with an established Patient who has not had a related appointment within my department in the past 7 days or scheduled within the next 24 hours.     Total Time: 10:46    Cc: Referring physician

## 2020-03-31 NOTE — PROGRESS NOTES
Nisha Kinney was read the following message We want to confirm that, for purposes of billing, this is a virtual visit with your provider for which we will submit a claim for reimbursement with your insurance company. You will be responsible for any copays, coinsurance amounts or other amounts not covered by your insurance company. If you do not accept this, unfortunately we will not be able to schedule a virtual visit with the provider. Do you accept? Claudia responded Yes .

## 2020-04-16 ENCOUNTER — TELEPHONE (OUTPATIENT)
Dept: PAIN MANAGEMENT | Age: 65
End: 2020-04-16

## 2020-04-30 RX ORDER — ATORVASTATIN CALCIUM 40 MG/1
TABLET, FILM COATED ORAL
Qty: 30 TABLET | Refills: 5 | Status: SHIPPED
Start: 2020-04-30 | End: 2020-10-13

## 2020-05-05 ENCOUNTER — VIRTUAL VISIT (OUTPATIENT)
Dept: FAMILY MEDICINE CLINIC | Age: 65
End: 2020-05-05
Payer: COMMERCIAL

## 2020-05-05 PROCEDURE — 3051F HG A1C>EQUAL 7.0%<8.0%: CPT | Performed by: FAMILY MEDICINE

## 2020-05-05 PROCEDURE — 3017F COLORECTAL CA SCREEN DOC REV: CPT | Performed by: FAMILY MEDICINE

## 2020-05-05 PROCEDURE — 99213 OFFICE O/P EST LOW 20 MIN: CPT | Performed by: FAMILY MEDICINE

## 2020-05-05 PROCEDURE — G8427 DOCREV CUR MEDS BY ELIG CLIN: HCPCS | Performed by: FAMILY MEDICINE

## 2020-05-05 PROCEDURE — G9899 SCRN MAM PERF RSLTS DOC: HCPCS | Performed by: FAMILY MEDICINE

## 2020-05-05 PROCEDURE — 2022F DILAT RTA XM EVC RTNOPTHY: CPT | Performed by: FAMILY MEDICINE

## 2020-05-05 RX ORDER — FAMOTIDINE 20 MG/1
20 TABLET, FILM COATED ORAL 2 TIMES DAILY
Qty: 60 TABLET | Refills: 3 | Status: SHIPPED
Start: 2020-05-05 | End: 2020-08-06 | Stop reason: ALTCHOICE

## 2020-05-05 RX ORDER — ASPIRIN 81 MG/1
81 TABLET ORAL DAILY
Qty: 30 TABLET | Refills: 5 | Status: SHIPPED
Start: 2020-05-05 | End: 2020-10-12

## 2020-05-05 NOTE — PROGRESS NOTES
[address]  Precepting Note    Subjective:  Hypertension  Follow-up  Blood pressure is controlled today. Sometimes running on the lower end at home, especially after dialysis. This is balanced with midodrine admin at the time. She is on IMDUR only at this time, with Hx of ischemic HD. She is off other BP meds. BP Readings from Last 3 Encounters:   03/26/20 126/70   03/17/20 (!) 103/52   03/17/20 (!) 153/70       Diabetes mellitus  Follow-up  She takes lantus 10 nightly and low dose sliding scale. Checks Bs occ. FBS around 98 in the morning. Lab Results   Component Value Date    LABA1C 7.4 03/03/2020    LABA1C 6.9 11/25/2019    LABA1C 7.1 07/25/2019     Lab Results   Component Value Date    LABMICR 1372.5 (H) 02/17/2017    LDLCALC 62 08/20/2019    CREATININE 5.2 (H) 03/03/2020     Wt Readings from Last 3 Encounters:   03/10/20 189 lb (85.7 kg)   03/03/20 202 lb (91.6 kg)   02/12/20 189 lb (85.7 kg)       CTS surgery went well on left side. Planning for surgery on right side in June. Hx heart disease, not on 81mg asa, thinks maybe was stopped for a surgery historically, and wasn't ever restarted. ROS otherwise negative     Past medical, surgical, family and social history were reviewed, non-contributory, and unchanged unless otherwise stated. Objective: There were no vitals taken for this visit. Exam is as noted by resident with the following changes, additions or corrections:    Virtual visit. Assessment/Plan:  HTN, controlled, off BP meds other than imdur for heart and lasix for water balance. DM, controlled. Continue current insulin regimen. Hx IHD  Could start 81mg asa daily for secondary prevention. Attending Physician Statement  I have reviewed the chart, including any radiology or labs. I have discussed the case, including pertinent history and exam findings with the resident.   I agree with the assessment, plan and orders as

## 2020-05-05 NOTE — PROGRESS NOTES
[facility]  Family Medicine Residency Program  Phone: [phone]  Fax: [phone]    Patient:  Dave Jacobsen 60 y.o. female                                 Date of Service: 5/5/20                            Chiefcomplaint:   Chief Complaint   Patient presents with    Diabetes    Hypertension         History of Present Illness: The patient is a 60 y.o. female  presented to the clinic with complaints as above. A video visit was used for this encounter. Dialysis pt-   M, W, F. Potential transplant initiation in June. Or testing to this effect at the very least. She is very compliant with medications and dialysis sessions. She recently had blood work and all her levels were within range    HTN-   SBP at home around 108 normally  It is typically lower during dialysis and she is given midodrine  She denies lightheadedness on her blood pressure is in the 100s but does admit to a headache when it is in the 80s  She denies chest pain or shortness of breath  We have stopped all her antihypertensive agents except for Imdur which she is on for ischemic heart disease per cardiology  Used to be on aspirin a long time ago but stopped before a surgery and never resumed       Diabetes   Excellent control  Using nightime Lantus 10 units   Using at most 1-2 units of Humalog with meals  Cannot consistently check her blood sugars due to  hardening of her skin/fingers  When she does check,  FBG is around 98    Left hand hand carpal tunnel release surgery recently  Feels much better and can sleep   Right hand scheduled for June    Hx of breast cancer- aggressive on the right side  Upcoming mammogram on May 21st  Follows [facility]  Denies any adenopathy     Review of Systems:   Refer to HPI     Past Medical History:      Diagnosis Date    Acute infection of bone ([address]     infection of rt foot, resolved.     Anemia of chronic disease     Breast cancer ([address]     right breast, 2008/ bladder, ANAST,UP ARM BASILIC VEIN TRANSPOSIT Left 5/15/2018    TRANSPOSITION STAGE II AV FISTULA - LEFT UPPER ARM performed by Sloane Hma MD at [address] [facility] ANGIOACCESS AV FISTULA Left 9/25/2018    SUPERFICIALIZATION AV FISTULA - LEFT ARM performed by Sloane Ham MD at [facility] [address] RETINAL [id number] [facility] METH Left 4/10/2018    PARS PLANA VITRECTOMY 25 GAUGE RETINAL DETACHMENT REPAIR air fluid exchange, endolaser performed by Pattie Garnica MD at [address] [facility] TUNNELED VENOUS CATHETER PLACEMENT  11/15/2017    VITRECTOMY Left 04/10/2018    PARS PLANA VITRECTOMY; RETINAL DETACHMENT REPAIR; GAS BUBBLE; LASER LEFT EYE       Allergies:    Lasix [furosemide]    Social History:   Social History     Socioeconomic History    Marital status: Single     Spouse name: Not on file    Number of children: Not on file    Years of education: Not on file    Highest education level: Not on file   Occupational History    Not on file   Social Needs    Financial resource strain: Not on file    Food insecurity     Worry: Not on file     Inability: Not on file    Transportation needs     Medical: Not on file     Non-medical: Not on file   Tobacco Use    Smoking status: Never Smoker    Smokeless tobacco: Never Used   Substance and Sexual Activity    Alcohol use: No    Drug use: No    Sexual activity: Never   Lifestyle    Physical activity     Days per week: Not on file     Minutes per session: Not on file    Stress: Not on file   Relationships    Social connections     Talks on phone: Not on file     Gets together: Not on file     Attends Tenriism service: Not on file     Active member of club or organization: Not on file     Attends meetings of clubs or organizations: Not on file     Relationship status: Not on file    Intimate partner violence     Fear of current or ex partner: Not on file     Emotionally abused: Not on file     Physically abused: Not on file Although effort is taken to assure the accuracy of this document, it is possible that grammatical, syntax,  or spelling errors may occur. Medication List:    Current Outpatient Medications   Medication Sig Dispense Refill    aspirin EC 81 MG EC tablet Take 1 tablet by mouth daily 30 tablet 5    famotidine (PEPCID) 20 MG tablet Take 1 tablet by mouth 2 times daily 60 tablet 3    atorvastatin (LIPITOR) 40 MG tablet TAKE ONE TABLET BY MOUTH EVERY DAY 30 tablet 5    [START ON 5/15/2020] oxyCODONE-acetaminophen (PERCOCET) 5-325 MG per tablet Take 1 tablet by mouth daily for 30 days. Do not fill until 05/15/2020. 30 tablet 0    allopurinol (ZYLOPRIM) 100 MG tablet TAKE ONE TABLET BY MOUTH DAILY 30 tablet 5    cyanocobalamin 1000 MCG/ML injection Inject 1,000 mcg into the muscle once Once a month at dialysis      omeprazole (PRILOSEC) 20 MG delayed release capsule TAKE ONE CAPSULE BY MOUTH EVERY DAY AT BEDTIME 30 capsule 2    VELPHORO 500 MG CHEW CRUSH OR CHEW AND SWALLOW 2 TABLETS 3 TIMES A DAY WITH MEALS      gabapentin (NEURONTIN) 100 MG capsule Take 2 capsules by mouth 3 times daily for 30 days. 90 capsule 1    isosorbide mononitrate (IMDUR) 30 MG extended release tablet TAKE ONE TABLET BY MOUTH TWO TIMES A DAY (Patient taking differently: TAKE ONE TABLET BY MOUTH TWO TIMES A DAY  Instructed to take am of procedure) 30 tablet 5    folic acid (FOLVITE) 1 MG tablet Take 1 tablet by mouth 5 times daily 150 tablet 5    lidocaine-prilocaine (EMLA) 2.5-2.5 % cream APPLY SMALL AMOUNT TO ACCESS SITE (AVF) 1 HOUR BEFORE DIALYSIS. COVER WITH OCCLUSIVE DRESSING (SARAN WRAP)  6    bumetanide (BUMEX) 2 MG tablet TAKE 1 TABLET BY MOUTH THREE TIMES A WEEK.  (Patient taking differently: Take 2 mg by mouth three times a week Sund, Tues, Thurs, and Sat) 38 tablet 4    COMBIGAN 0.2-0.5 % ophthalmic solution INSTILL ONE DROP INTO THE RIGHT EYE TWICE A DAY  7    camphor-menthol (SARNA) 0.5-0.5 % lotion Apply topically as

## 2020-05-18 RX ORDER — OMEPRAZOLE 20 MG/1
CAPSULE, DELAYED RELEASE ORAL
Qty: 30 CAPSULE | Refills: 5 | Status: SHIPPED
Start: 2020-05-18 | End: 2020-11-23

## 2020-06-22 ENCOUNTER — OFFICE VISIT (OUTPATIENT)
Dept: ORTHOPEDIC SURGERY | Age: 65
End: 2020-06-22
Payer: COMMERCIAL

## 2020-06-22 VITALS — WEIGHT: 187 LBS | BODY MASS INDEX: 26.77 KG/M2 | HEIGHT: 70 IN | TEMPERATURE: 96.8 F

## 2020-06-22 PROCEDURE — 20610 DRAIN/INJ JOINT/BURSA W/O US: CPT | Performed by: ORTHOPAEDIC SURGERY

## 2020-06-22 PROCEDURE — G9899 SCRN MAM PERF RSLTS DOC: HCPCS | Performed by: ORTHOPAEDIC SURGERY

## 2020-06-22 PROCEDURE — G8419 CALC BMI OUT NRM PARAM NOF/U: HCPCS | Performed by: ORTHOPAEDIC SURGERY

## 2020-06-22 PROCEDURE — 3017F COLORECTAL CA SCREEN DOC REV: CPT | Performed by: ORTHOPAEDIC SURGERY

## 2020-06-22 PROCEDURE — 99213 OFFICE O/P EST LOW 20 MIN: CPT | Performed by: ORTHOPAEDIC SURGERY

## 2020-06-22 PROCEDURE — 1036F TOBACCO NON-USER: CPT | Performed by: ORTHOPAEDIC SURGERY

## 2020-06-22 PROCEDURE — G8427 DOCREV CUR MEDS BY ELIG CLIN: HCPCS | Performed by: ORTHOPAEDIC SURGERY

## 2020-06-22 RX ORDER — TRIAMCINOLONE ACETONIDE 40 MG/ML
40 INJECTION, SUSPENSION INTRA-ARTICULAR; INTRAMUSCULAR ONCE
Status: COMPLETED | OUTPATIENT
Start: 2020-06-22 | End: 2020-06-22

## 2020-06-22 RX ORDER — LIDOCAINE HYDROCHLORIDE 10 MG/ML
4 INJECTION, SOLUTION INFILTRATION; PERINEURAL ONCE
Status: COMPLETED | OUTPATIENT
Start: 2020-06-22 | End: 2020-06-22

## 2020-06-22 RX ORDER — BIMATOPROST 0.01 %
1 DROPS OPHTHALMIC (EYE) NIGHTLY
COMMUNITY
Start: 2020-06-09

## 2020-06-22 RX ORDER — ISOSORBIDE MONONITRATE 30 MG/1
TABLET, EXTENDED RELEASE ORAL
Qty: 30 TABLET | Refills: 5 | Status: CANCELLED | OUTPATIENT
Start: 2020-06-22

## 2020-06-22 RX ORDER — TRIAMCINOLONE ACETONIDE 1 MG/G
CREAM TOPICAL
COMMUNITY
Start: 2020-06-15 | End: 2020-08-06 | Stop reason: ALTCHOICE

## 2020-06-22 RX ADMIN — LIDOCAINE HYDROCHLORIDE 4 ML: 10 INJECTION, SOLUTION INFILTRATION; PERINEURAL at 15:35

## 2020-06-22 RX ADMIN — TRIAMCINOLONE ACETONIDE 40 MG: 40 INJECTION, SUSPENSION INTRA-ARTICULAR; INTRAMUSCULAR at 15:35

## 2020-06-23 RX ORDER — ISOSORBIDE MONONITRATE 30 MG/1
30 TABLET, EXTENDED RELEASE ORAL DAILY
Qty: 60 TABLET | Refills: 2 | Status: SHIPPED
Start: 2020-06-23 | End: 2021-01-04

## 2020-06-26 RX ORDER — ALLOPURINOL 100 MG/1
TABLET ORAL
Qty: 30 TABLET | Refills: 0 | Status: SHIPPED
Start: 2020-06-26 | End: 2020-08-06 | Stop reason: SDUPTHER

## 2020-07-16 RX ORDER — FOLIC ACID 1 MG/1
1 TABLET ORAL
Qty: 150 TABLET | Refills: 0 | Status: SHIPPED
Start: 2020-07-16 | End: 2020-09-23

## 2020-07-22 RX ORDER — INSULIN GLARGINE 100 [IU]/ML
INJECTION, SOLUTION SUBCUTANEOUS
Qty: 15 ML | Refills: 0 | Status: SHIPPED
Start: 2020-07-22 | End: 2020-11-23 | Stop reason: SDUPTHER

## 2020-07-22 NOTE — TELEPHONE ENCOUNTER
Would like to see Mayra Salgado sometime in the next 2 months    Medication approved and sent to pharmacy.  Thank you

## 2020-08-06 ENCOUNTER — OFFICE VISIT (OUTPATIENT)
Dept: FAMILY MEDICINE CLINIC | Age: 65
End: 2020-08-06
Payer: COMMERCIAL

## 2020-08-06 VITALS
OXYGEN SATURATION: 95 % | HEART RATE: 80 BPM | SYSTOLIC BLOOD PRESSURE: 122 MMHG | RESPIRATION RATE: 18 BRPM | DIASTOLIC BLOOD PRESSURE: 60 MMHG | WEIGHT: 191 LBS | HEIGHT: 70 IN | BODY MASS INDEX: 27.35 KG/M2 | TEMPERATURE: 98 F

## 2020-08-06 LAB — HBA1C MFR BLD: 6.2 %

## 2020-08-06 PROCEDURE — 99213 OFFICE O/P EST LOW 20 MIN: CPT | Performed by: STUDENT IN AN ORGANIZED HEALTH CARE EDUCATION/TRAINING PROGRAM

## 2020-08-06 PROCEDURE — 2022F DILAT RTA XM EVC RTNOPTHY: CPT | Performed by: STUDENT IN AN ORGANIZED HEALTH CARE EDUCATION/TRAINING PROGRAM

## 2020-08-06 PROCEDURE — 83036 HEMOGLOBIN GLYCOSYLATED A1C: CPT | Performed by: STUDENT IN AN ORGANIZED HEALTH CARE EDUCATION/TRAINING PROGRAM

## 2020-08-06 PROCEDURE — 1036F TOBACCO NON-USER: CPT | Performed by: STUDENT IN AN ORGANIZED HEALTH CARE EDUCATION/TRAINING PROGRAM

## 2020-08-06 PROCEDURE — G8419 CALC BMI OUT NRM PARAM NOF/U: HCPCS | Performed by: STUDENT IN AN ORGANIZED HEALTH CARE EDUCATION/TRAINING PROGRAM

## 2020-08-06 PROCEDURE — G8427 DOCREV CUR MEDS BY ELIG CLIN: HCPCS | Performed by: STUDENT IN AN ORGANIZED HEALTH CARE EDUCATION/TRAINING PROGRAM

## 2020-08-06 PROCEDURE — G9899 SCRN MAM PERF RSLTS DOC: HCPCS | Performed by: STUDENT IN AN ORGANIZED HEALTH CARE EDUCATION/TRAINING PROGRAM

## 2020-08-06 PROCEDURE — 3044F HG A1C LEVEL LT 7.0%: CPT | Performed by: STUDENT IN AN ORGANIZED HEALTH CARE EDUCATION/TRAINING PROGRAM

## 2020-08-06 PROCEDURE — 3017F COLORECTAL CA SCREEN DOC REV: CPT | Performed by: STUDENT IN AN ORGANIZED HEALTH CARE EDUCATION/TRAINING PROGRAM

## 2020-08-06 RX ORDER — ALLOPURINOL 100 MG/1
100 TABLET ORAL DAILY
Qty: 90 TABLET | Refills: 0 | Status: SHIPPED
Start: 2020-08-06 | End: 2020-11-24 | Stop reason: SDUPTHER

## 2020-08-06 RX ORDER — PEN NEEDLE, DIABETIC 31 G X1/4"
1 NEEDLE, DISPOSABLE MISCELLANEOUS DAILY
Qty: 100 EACH | Refills: 5 | Status: SHIPPED
Start: 2020-08-06 | End: 2021-05-19

## 2020-08-06 RX ORDER — GABAPENTIN 100 MG/1
200 CAPSULE ORAL 3 TIMES DAILY
Qty: 90 CAPSULE | Refills: 1 | Status: CANCELLED | OUTPATIENT
Start: 2020-08-06 | End: 2020-09-05

## 2020-08-06 ASSESSMENT — ENCOUNTER SYMPTOMS
RHINORRHEA: 0
VOMITING: 0
SHORTNESS OF BREATH: 0
BACK PAIN: 1
NAUSEA: 0
ABDOMINAL PAIN: 0
COUGH: 0

## 2020-08-06 NOTE — PROGRESS NOTES
S: 60 y.o. female with   Chief Complaint   Patient presents with    Established New Doctor    Diabetes    Referral - General       DM2 - ESRD on dialysis, HTN. Well controled conditions. No concerns today. BP Readings from Last 3 Encounters:   08/06/20 122/60   03/26/20 126/70   03/17/20 (!) 103/52       O: VS:  height is 5' 10\" (1.778 m) and weight is 191 lb (86.6 kg). Her temperature is 98 °F (36.7 °C). Her blood pressure is 122/60 and her pulse is 80. Her respiration is 18 and oxygen saturation is 95%. AAO/NAD, appropriate affect for mood  CV:  RRR, no murmur  Resp: CTAB      Impression/Plan:   1) DM2 - stable. Health Maintenance Due   Topic Date Due    DTaP/Tdap/Td vaccine (1 - Tdap) 01/06/1975    Cervical cancer screen  01/06/1977    Shingles Vaccine (1 of 2) 01/06/2006    Annual Wellness Visit (AWV)  04/03/2019    Diabetic retinal exam  04/24/2019    Pneumococcal 0-64 years Vaccine (3 of 3 - PCV13) 02/20/2020    Breast cancer screen  05/02/2020    Lipid screen  08/20/2020         Attending Physician Statement  I have discussed the case, including pertinent history and exam findings with the resident. I agree with the documented assessment and plan.       Promise Michel MD

## 2020-08-06 NOTE — PROGRESS NOTES
Neuropathy     feet    Osteomyelitis due to secondary diabetes ([facility] [address]     rt great toe with amputation    Patient is [employer] 11/7/2017    Refusal of blood product     patient states she dose not take blood transfusion    Ventricular hypertrophy     Vitreous hemorrhage ([facility] [address]     left eye       Review of Systems:   Review of Systems   Constitutional: Negative for chills and fever. HENT: Negative for congestion and rhinorrhea. Respiratory: Negative for cough and shortness of breath. Cardiovascular: Negative for chest pain and leg swelling. Gastrointestinal: Negative for abdominal pain, nausea and vomiting. Genitourinary: Negative for dysuria and hematuria. Musculoskeletal: Positive for arthralgias and back pain. Negative for myalgias. Skin: Positive for rash. Negative for wound. Neurological: Negative for dizziness and light-headedness. Physical Exam   Vitals: /60   Pulse 80   Temp 98 °F (36.7 °C)   Resp 18   Ht 5' 10\" (1.778 m)   Wt 191 lb (86.6 kg)   SpO2 95%   BMI 27.41 kg/m²   Physical Exam    General:  Well developed, well nourished, well groomed. No apparent distress. HEENT:  Normocephalic, atraumatic. No scleral icterus. No conjunctival injection. No nasal discharge. Heart:  RRR, no murmurs, gallops, or rubs  Lungs:  CTA bilaterally, bilat symmetrical expansion, no wheeze, rales, or rhonchi  Abdomen: Bowel sounds present, soft, nontender, no masses, no organomegaly, no peritoneal signs  Extremities:  No clubbing, cyanosis, or edema  Neuro:  Alert and oriented x3, no focal deficits      Assessment and Plan     In regards to cervical cancer screening the patient and I participated in shared decision making. I counseled the patient that due to not having any symptoms and never being sexually active she is likely very low risk for cervical cancer however would still be my recommendation that she obtain a Pap smear.   Patient verbalized understanding however due to her low risk opted not to undergo Pap smear, I advised her to alert me if she had any vaginal bleeding or any other genitourinary symptoms whatsoever. She verbalized understanding. 1. Type 2 diabetes mellitus with stage 4 chronic kidney disease, unspecified whether long term insulin use (HCC)  -Well-controlled, no episodes of hypoglycemia, continue same regimen  - Insulin Pen Needle (PEN NEEDLES) 31G X 6 MM MISC; 1 each by Does not apply route daily  Dispense: 100 each; Refill: 5  - POCT glycosylated hemoglobin (Hb A1C)    2. Medication refill  - allopurinol (ZYLOPRIM) 100 MG tablet; Take 1 tablet by mouth daily  Dispense: 90 tablet; Refill: 0    3. Encounter for screening for malignant neoplasm of colon  - Alissa Hendricks MD, General Surgery, [facility] regarding above diagnosis, including possible risks and complications,  especially if left uncontrolled. Counseled regarding the possible side effects, risks, benefits and alternatives to treatment;patient and/or guardian verbalizes understanding, agrees, feels comfortable with and wishes to proceed with above treatment plan. Call or go to ED immediately if symptoms worsen or persist. Advised patient to call with any new medication issues, and, as applicable, read all Rx info from pharmacy to assure aware of all possible risks and side effects of medicationbefore taking. Patient and/or guardian given opportunity to ask questions/raise concerns. The patient verbalized comfort and understanding ofinstructions. I encourage further reading and education about your health conditions. Information on many health conditions is provided by [facility] [employer]: [email or web address]  Please bring any questions to me at your nextvisit. Return to Office: Return in about 6 months (around 2/6/2021) for Blood Pressure Check, diabetes.     Medication List:    Current Outpatient Medications   Medication Sig Dispense Refill    Insulin Pen Needle (PEN NEEDLES) 31G X 6 MM MISC 1 each by Does not apply route daily 100 each 5    allopurinol (ZYLOPRIM) 100 MG tablet Take 1 tablet by mouth daily 90 tablet 0    LANTUS SOLOSTAR 100 UNIT/ML injection pen inject 11 units into the skin nightly 15 mL 0    folic acid (FOLVITE) 1 MG tablet TAKE 1 TABLET BY MOUTH 5 TIMES DAILY 150 tablet 0    isosorbide mononitrate (IMDUR) 30 MG extended release tablet Take 1 tablet by mouth daily 60 tablet 2    LUMIGAN 0.01 % SOLN ophthalmic drops instill 1 drop every evening into right eye      omeprazole (PRILOSEC) 20 MG delayed release capsule TAKE ONE CAPSULE BY MOUTH EVERY DAY at bedtime 30 capsule 5    aspirin EC 81 MG EC tablet Take 1 tablet by mouth daily 30 tablet 5    atorvastatin (LIPITOR) 40 MG tablet TAKE ONE TABLET BY MOUTH EVERY DAY 30 tablet 5    cyanocobalamin 1000 MCG/ML injection Inject 1,000 mcg into the muscle once Once a month at dialysis      VELPHORO 500 MG CHEW CRUSH OR CHEW AND SWALLOW 2 TABLETS 3 TIMES A DAY WITH MEALS      lidocaine-prilocaine (EMLA) 2.5-2.5 % cream APPLY SMALL AMOUNT TO ACCESS SITE (AVF) 1 HOUR BEFORE DIALYSIS. COVER WITH OCCLUSIVE DRESSING (SARAN WRAP)  6    bumetanide (BUMEX) 2 MG tablet TAKE 1 TABLET BY MOUTH THREE TIMES A WEEK. (Patient taking differently: Take 2 mg by mouth three times a week Sund, Tues, Thurs, and Sat) 38 tablet 4    COMBIGAN 0.2-0.5 % ophthalmic solution INSTILL ONE DROP INTO THE RIGHT EYE TWICE A DAY  7    camphor-menthol (SARNA) 0.5-0.5 % lotion Apply topically as needed. 1 Bottle 4    insulin lispro (HUMALOG KWIKPEN) 100 UNIT/ML pen Take 2 units > 140 , take 3 units > 170, take 4 units > 200 units 5 pen 5    blood glucose test strips (ACCU-CHEK ACTIVE STRIPS) strip 1 each by In Vitro route 2 times daily As needed.  100 each 5    sevelamer (RENVELA) 800 MG tablet Take 1 tablet by mouth 3 times daily (with meals)      darbepoetin fani-polysorbate (ARANESP) 40 MCG/0.4ML SOLN injection Inject 0.4 mLs into the skin once a week. (Patient taking differently: Inject 40 mcg into the skin every 14 days ) 8.4 mL 0    gabapentin (NEURONTIN) 100 MG capsule Take 2 capsules by mouth 3 times daily for 30 days. 90 capsule 1     No current facility-administered medications for this visit. Soni Jiang MD     This document may have been prepared at least partially through the use of voice recognition software. Although effort is taken to assure the accuracy ofthis document, it is possible that grammatical, syntax,  or spelling errors may occur.

## 2020-09-02 ENCOUNTER — TELEPHONE (OUTPATIENT)
Dept: SURGERY | Age: 65
End: 2020-09-02

## 2020-09-02 ENCOUNTER — OFFICE VISIT (OUTPATIENT)
Dept: SURGERY | Age: 65
End: 2020-09-02
Payer: COMMERCIAL

## 2020-09-02 VITALS
DIASTOLIC BLOOD PRESSURE: 61 MMHG | WEIGHT: 191.2 LBS | OXYGEN SATURATION: 94 % | HEART RATE: 78 BPM | BODY MASS INDEX: 27.43 KG/M2 | RESPIRATION RATE: 16 BRPM | SYSTOLIC BLOOD PRESSURE: 112 MMHG | TEMPERATURE: 97.3 F

## 2020-09-02 PROCEDURE — 99203 OFFICE O/P NEW LOW 30 MIN: CPT | Performed by: SURGERY

## 2020-09-02 PROCEDURE — G8419 CALC BMI OUT NRM PARAM NOF/U: HCPCS | Performed by: SURGERY

## 2020-09-02 PROCEDURE — G9899 SCRN MAM PERF RSLTS DOC: HCPCS | Performed by: SURGERY

## 2020-09-02 PROCEDURE — G8427 DOCREV CUR MEDS BY ELIG CLIN: HCPCS | Performed by: SURGERY

## 2020-09-02 NOTE — PATIENT INSTRUCTIONS
Patient Information and Instructions for Colonoscopy         Definition of Colonoscopy   A colonoscopy is the visual exam of the rectum and colon (large intestine). The exam is done with a tool called a colonoscope. The colonoscope is a flexible tube with a tiny camera on the end. This instrument allows the doctor to view the inside of your rectum and colon. Sigmoidoscopy is a shorter scope that views only the last one third of the colon. Reasons for Colonoscopy   It is used to examine, diagnose, and treat problems in your large intestine. The procedure is most often done for the following reasons: To determine the cause of abdominal pain, rectal bleeding, or a change in bowel habits   To detect and treat colon cancer or colon polyps   To obtain tissue samples for testing   To stop intestinal bleeding   Monitor response to treatment if you have inflammatory bowel disease     Possible Complications   Complications are rare, but no procedure is completely free of risk. If you are planning to have a colonoscopy, your doctor will review a list of possible complications, which may include:   Bleeding   Reaction to the sedation causing drop in your blood pressure or problems breathing  Perforation or puncture of the bowel     Factors that may increase the risk of complications include:   Pre-existing heart or kidney condition   Treatment with certain medicines, including aspirin and other drugs with anticoagulant or blood-thinning properties   Prior abdominal surgery or radiation treatments   Active colitis , diverticulitis , or other acute bowel disease   Previous treatment with radiation therapy     Be sure to discuss these risks with your doctor before the procedure.      What to Expect   Prior to Procedure   Your doctor will likely do the following:   Physical exam   Health history   Review of medicines   Test your stool for hidden blood (called \"occult blood\")     Your colon must be completely clean before the procedure. Any stool left in the intestine will block the view. This preparation may start several days before the procedure. Follow your doctor's instructions. Leading up to your procedure:   Talk to your doctor about your medicines. You may be asked to stop taking some medicines up to one week before the procedure, like:   Anti-inflammatory drugs (e.g., aspirin )   Blood thinners like clopidogrel (Plavix) or warfarin (Coumadin)   Iron supplements or vitamins containing iron   The day or days before your procedure, go on a clear liquid diet (clear broth, clear juice, clear jello) with no red coloring  Do not eat or drink anything after midnight. Wear comfortable clothing. If you have diabetes, ask your doctor if you need to adjust your diabetes medicine on the day prior to your procedure and the day of your procedure. Arrange for a ride home after the procedure. Anesthesia   You will receive intravenous sedation medicine for the procedure so you will not feel anything during the procedure. Description of the Procedure   You will lie on your left side with knees bent and drawn up toward your chest. The colonoscope will be slowly inserted through the rectum and into the bowel. The colonoscope will inject air into the colon. A small attached video camera will allow the doctor to view the colon's lining on a screen. The doctor will continue guiding the tool through the bowel and assess the lining. A tissue sample or polyps may be removed during the procedure. How Long Will It Take? Usually it takes about 30 to 45 minutes     Will It Hurt? Most people do not feel anything during the procedure and will not remember the procedure. After the procedure, gas pains and cramping are common. These pains should go away with the passing of gas. Post-procedure Care   If any tissue was removed: It will be sent to a lab to be examined. It may take 1-2 weeks for results.  The doctor will usually give an initial report after the scope is removed. Other tests may be recommended. A small amount of bleeding may occur during the first few days after the procedure. When you return home after the procedure, be sure to follow your doctor's instructions, which may include:   Resume medicines as instructed by your doctor. Resume normal diet, unless directed otherwise by your doctor. The sedative will make you drowsy. Avoid driving, operating machinery, or making important decisions for the rest of the day. Rest for the remainder of the day. After arriving home, contact your doctor if any of the following occurs:   Bleeding from your rectum, notify your doctor if you pass a teaspoonful of blood or more. Black, tarry stools   Severe abdominal pain   Hard, swollen abdomen   Signs of infection, including fever or chills   Inability to pass gas or stool   Coughing, shortness of breath, chest pain, severe nausea or vomiting     In case of an emergency, CALL 911 . [facility] [place]  MAGNESIUM CITRATE  COLON PREP FOR COLONOSCOPY    It is very important that you follow all of the instructions listed on this sheet carefully (they may be slightly different than the directions on the product that you purchase at the pharmacy) to ensure that you colon is adequately cleaned out or you risk of complications could be increased. 2 Days or More Before Endoscopy:   Obtain two 10-ounce bottle of Magnesium Citrate from the pharmacy.  Do not eat corn, tomatoes, peas or watermelon 3 to 5 days before procedure.  If you are on INSULIN or OTHER DIABETIC MEDICATIONS, then check with your primary care physician as to how to adjust your medication while on clear liquid diet and when nothing by mouth. 1 Day Before the Endoscopy:   No solid food - only clear liquids (soup, jello, or juice that you can see through with no solid food) for breakfast, lunch and supper.   DO NOT drink or eat anything that is red or purple as it will turn the inside of the colon red and look like blood. Nothing to eat or drink after midnight.  Have at least 8 oz or more of clear liquids for breakfast (7am to 8am) and lunch (11:30am to 12:30pm).  1:00pm Drink at least 8oz of clear liquids.  2:00pm Drink at least 8oz of clear liquids.  3:00pm Drink at least 8oz of clear liquids.  4:00pm Drink at least 8oz of clear liquids.  5:00pm Drink a 10 oz bottle of Magnesium Citrate followed immediately by at least 8oz of clear liquids.  6:00pm Dinner - all clear liquids.  7:00pm Drink a 10oz bottle of Magnesium Citrate followed immediately by at least 8oz of clear liquids.  8:00pm Drink at least 8oz of clear liquids.  Can continue to take liquids until 12 midnight then nothing to eat or drink    Day of Endoscopy:   Nothing to eat or drink after midnight the night before the endoscopy. However, if you are taking any blood pressure medications or heart medications, you should take them with a sip of water. Instructions for Clear liquid diet  Definition  A clear liquid diet consists of clear liquids, such as water, broth and plain gelatin, that are easily digested and leave no undigested residue in your intestinal tract. Your doctor may prescribe a clear liquid diet before certain medical procedures or if you have certain digestive problems. Because a clear liquid diet can't provide you with adequate calories and nutrients, it shouldn't be continued for more than a few days. Purpose  A clear liquid diet is often used before tests, procedures or surgeries that require no food in your stomach or intestines, such as before colonoscopy. It may also be recommended as a short-term diet if you have certain digestive problems, such as nausea, vomiting or diarrhea, or after certain types of surgery.      Diet details  A clear liquid diet helps maintain adequate hydration, provides some important electrolytes, such as sodium and potassium, and gives some energy at a time when a full diet isn't possible or recommended. The following foods are allowed in a clear liquid diet:    Plain water    Fruit juices without pulp, such as apple juice, grape juice or cranberry juice    Strained lemonade or fruit punch    Clear, fat-free broth (bouillon or consomme)    Clear sodas    Plain gelatin    Honey    Ice pops without bits of fruit or fruit pulp    Tea or coffee without milk or cream    Any foods not on the above list should be avoided. Also, for certain tests, such as colon exams, your doctor may ask you to avoid liquids or gelatin with red coloring. A typical menu on the clear liquid diet may look like this:     Breakfast:  1 glass fruit juice  1 cup coffee or tea (without dairy products)  1 cup broth  1 bowl gelatin     Snack:  1 glass fruit juice  1 bowl gelatin     Lunch:  1 glass fruit juice  1 glass water  1 cup broth  1 bowl gelatin     Snack:  1 ice pop (without fruit pulp)  1 cup coffee or tea (without dairy products) or a soft drink     Dinner:  1 cup juice or water  1 cup broth  1 bowl gelatin  1 cup coffee or tea     Results  Although the clear liquid diet may not be very exciting, it does fulfill its purpose. It's designed to keep your stomach and intestines clear, limit strain to your digestive system, but keep your body hydrated as you prepare for or recover from a medical procedure. Risks  Because a clear liquid diet can't provide you with adequate calories and nutrients, it shouldn't be used for more than a few days. Only use the clear liquid diet as directed by your doctor. If your doctor prescribes a clear liquid diet before a medical test, be sure to follow the diet instructions exactly. If you don't follow the diet exactly, you risk an inaccurate test and may have to reschedule the procedure for another time.      The importance of proper hydration  A colonoscopy prep causes the body to lose a

## 2020-09-08 RX ORDER — SODIUM CHLORIDE 9 MG/ML
INJECTION, SOLUTION INTRAVENOUS CONTINUOUS
Status: CANCELLED | OUTPATIENT
Start: 2020-09-08

## 2020-09-08 ASSESSMENT — ENCOUNTER SYMPTOMS
PHOTOPHOBIA: 0
BACK PAIN: 0
ABDOMINAL PAIN: 0
BLOOD IN STOOL: 0
SHORTNESS OF BREATH: 0
COUGH: 0
WHEEZING: 0
EYE REDNESS: 0
DIARRHEA: 0
CONSTIPATION: 0
SORE THROAT: 0
NAUSEA: 0
VOMITING: 0

## 2020-09-08 NOTE — PROGRESS NOTES
Consult Note    Dear Cris Goodman MD, thank you for referring Ivette Yun for evaluation. Reason for Consult: Colonoscopy    HISTORY OF PRESENT ILLNESS:    The patient is a 60 y.o. female who presents to discuss colonoscopy. Has been greater than 25 years since her last colonoscopy. She is presently candidate for renal transplant. Colonoscopy is indicated preoperatively for evaluation. She does complain of occasional constipation. She denies any family history of colon cancer. She denies any rectal bleeding. Past Medical History:   Diagnosis Date    Acute infection of bone ([address]     infection of rt foot, resolved.     Anemia of chronic disease     Breast cancer ([address]     right breast, 2008/ bladder, 2006- last chemotherapy \"years ago\"    CAD (coronary artery disease)     Carpal tunnel syndrome     bilat - for OR left hand 3-17-20     Chronic diastolic CHF (congestive heart failure) ([address] 09/23/2014 9/23/14- echocardiogram revealed moderate LV concentric hypertrophy, stage III diastolic dysfunction, mild tricuspid regurgitation    CKD (chronic kidney disease) stage 4, GFR 15-29 ml/min ([facility])     Diabetic retinopathy ([address]     Glaucoma     Hemodialysis patient ([address]     [facility]- Dr. Artie Felipe - left arm fistula     Hyperkalemia, diminished renal excretion 11/9/2017    Hypertension     Hypoglycemia unawareness in type 1 diabetes mellitus ([address] 11/7/2017    Insulin dependent type 2 diabetes mellitus ([address]     Neuropathy     feet    Osteomyelitis due to secondary diabetes ([address]     rt great toe with amputation    Patient is Hinduism 11/7/2017    Refusal of blood product     patient states she dose not take blood transfusion    Ventricular hypertrophy     Vitreous hemorrhage ([address]     left eye       Past Surgical History:   Procedure Laterality Date    AMPUTATION      right great toe    ANKLE SURGERY      correction on charcot joint of right ankle  CARPAL TUNNEL RELEASE Left 3/17/2020    LEFT CARPAL TUNNEL RELEASE performed by Arin Prasad MD at [address]      bilateral    CHOLECYSTECTOMY      COLONOSCOPY      CYSTOSCOPY      DIALYSIS FISTULA CREATION Left 01/31/2018    upper arm/Dr. Marcelino Alva    ECHO COMPL W DOP COLOR FLOW  2/14/2013         ECHO COMPLETE  9/17/2013         MASTECTOMY      right    OTHER SURGICAL HISTORY  9/27/2011    PPV, membranectomy, laser Right eye    OTHER SURGICAL HISTORY  insertion lumbar drain insertion    10/12/`14    OTHER SURGICAL HISTORY  10/22/15    percutaneous lead placement for spinal cord stimulator    OTHER SURGICAL HISTORY  11/03/2015    Spinal; cord stimulator- turned off as of 3-10-20     NV AV ANAST,UP ARM BASILIC VEIN TRANSPOSIT Left 5/15/2018    TRANSPOSITION STAGE II AV FISTULA - LEFT UPPER ARM performed by Chago Rodrigues MD at [address] [facility] ANGIOACCESS AV FISTULA Left 9/25/2018    SUPERFICIALIZATION AV FISTULA - LEFT ARM performed by Chago Rodrigues MD at [address] [facility] W/VITRECTOMY ANY METH Left 4/10/2018    PARS PLANA VITRECTOMY 25 GAUGE RETINAL DETACHMENT REPAIR air fluid exchange, endolaser performed by Frank Bean MD at [address] TUNNELED 1 [facility]  11/15/2017    VITRECTOMY Left 04/10/2018    PARS PLANA VITRECTOMY; RETINAL DETACHMENT REPAIR; GAS BUBBLE; LASER LEFT EYE       Prior to Admission medications    Medication Sig Start Date End Date Taking?  Authorizing Provider   Insulin Pen Needle (PEN NEEDLES) 31G X 6 MM MISC 1 each by Does not apply route daily 8/6/20  Yes Checo Newton MD   allopurinol (ZYLOPRIM) 100 MG tablet Take 1 tablet by mouth daily 8/6/20  Yes Checo Newton MD   LANTUS SOLOSTAR 100 UNIT/ML injection pen inject 11 units into the skin nightly 7/22/20  Yes Checo Newton MD   folic acid (FOLVITE) 1 MG tablet TAKE 1 TABLET BY MOUTH 5 TIMES DAILY 7/16/20  Yes Nataly Jarquin MD   isosorbide mononitrate (IMDUR) 30 MG extended release tablet Take 1 tablet by mouth daily 6/23/20  Yes MD LOLITA Vazquez 0.01 % SOLN ophthalmic drops instill 1 drop every evening into right eye 6/9/20  Yes Historical Provider, MD   omeprazole (PRILOSEC) 20 MG delayed release capsule TAKE ONE CAPSULE BY MOUTH EVERY DAY at bedtime 5/18/20  Yes Hiram Sanchez MD   aspirin EC 81 MG EC tablet Take 1 tablet by mouth daily 5/5/20  Yes Hiram Sanchez MD   atorvastatin (LIPITOR) 40 MG tablet TAKE ONE TABLET BY MOUTH EVERY DAY 4/30/20  Yes Hiram Sanchez MD   cyanocobalamin 1000 MCG/ML injection Inject 1,000 mcg into the muscle once Once a month at dialysis   Yes Historical Provider, MD   VELPHORO 500 MG CHEW CRUSH OR CHEW AND SWALLOW 2 TABLETS 3 TIMES A DAY WITH MEALS 1/9/20  Yes Historical Provider, MD   lidocaine-prilocaine (EMLA) 2.5-2.5 % cream APPLY SMALL AMOUNT TO ACCESS SITE (AVF) 1 HOUR BEFORE DIALYSIS. COVER WITH OCCLUSIVE DRESSING (SARAN WRAP) 10/25/19  Yes Historical Provider, MD   bumetanide (BUMEX) 2 MG tablet TAKE 1 TABLET BY MOUTH THREE TIMES A WEEK. Patient taking differently: Take 2 mg by mouth three times a week Sund, Tues, Thurs, and Sat 9/30/19  Yes Hiram Sanchez MD   COMBIGAN 0.2-0.5 % ophthalmic solution INSTILL ONE DROP INTO THE RIGHT EYE TWICE A DAY 8/1/19  Yes Historical Provider, MD   camphor-menthol (SARNA) 0.5-0.5 % lotion Apply topically as needed. 5/21/19  Yes Hiram Sanchez MD   insulin lispro (HUMALOG KWIKPEN) 100 UNIT/ML pen Take 2 units > 140 , take 3 units > 170, take 4 units > 200 units 5/3/19  Yes Hiram Sanchez MD   blood glucose test strips (ACCU-CHEK ACTIVE STRIPS) strip 1 each by In Vitro route 2 times daily As needed.  12/4/18  Yes Hiram Sanchez MD   sevelamer (RENVELA) 800 MG tablet Take 1 tablet by mouth 3 times daily (with meals)   Yes Historical Provider, MD   darbepoetin fani-polysorbate (ARANESP) 40 MCG/0.4ML SOLN injection Inject 0.4 mLs into the skin once a week. Patient taking differently: Inject 40 mcg into the skin every 14 days  10/31/14  Yes Liam Contreras MD   gabapentin (NEURONTIN) 100 MG capsule Take 2 capsules by mouth 3 times daily for 30 days.  2/6/20 6/22/20  Ellyn Cerda DO       Scheduled Meds:  ContinuousInfusions:  PRN Meds:    Allergies   Allergen Reactions    Lasix [Furosemide] Other (See Comments)     States her kidneys shut down       Social History     Socioeconomic History    Marital status: Single     Spouse name: Not on file    Number of children: Not on file    Years of education: Not on file    Highest education level: Not on file   Occupational History    Not on file   Social Needs    Financial resource strain: Not on file    Food insecurity     Worry: Not on file     Inability: Not on file    Transportation needs     Medical: Not on file     Non-medical: Not on file   Tobacco Use    Smoking status: Never Smoker    Smokeless tobacco: Never Used   Substance and Sexual Activity    Alcohol use: No    Drug use: No    Sexual activity: Never   Lifestyle    Physical activity     Days per week: Not on file     Minutes per session: Not on file    Stress: Not on file   Relationships    Social connections     Talks on phone: Not on file     Gets together: Not on file     Attends [employer] service: Not on file     Active member of club or organization: Not on file     Attends meetings of clubs or organizations: Not on file     Relationship status: Not on file    Intimate partner violence     Fear of current or ex partner: Not on file     Emotionally abused: Not on file     Physically abused: Not on file     Forced sexual activity: Not on file   Other Topics Concern    Not on file   Social History Narrative    Not on file       Family History   Problem Relation Age of Onset    Breast Cancer Mother 61    Hypertension Mother     Heart Disease Father     Prostate Cancer Father     Breast Cancer Maternal Grandmother 60       Review Of Systems:   Review of Systems   Constitutional: Negative for chills and fever. HENT: Negative for ear pain, nosebleeds, sore throat and tinnitus. Eyes: Negative for photophobia and redness. Respiratory: Negative for cough, shortness of breath and wheezing. Cardiovascular: Negative for chest pain and palpitations. Gastrointestinal: Negative for abdominal pain, blood in stool, constipation, diarrhea, nausea and vomiting. Endocrine: Negative for polydipsia. Genitourinary: Negative for dysuria, hematuria and urgency. Musculoskeletal: Negative for back pain and neck pain. Skin: Negative for rash. Neurological: Negative for dizziness, tremors and seizures. Hematological: Does not bruise/bleed easily. All other systems reviewed and are negative. Physical Exam:  Vitals:    09/02/20 1448   BP: 112/61   Site: Right Upper Arm   Position: Sitting   Cuff Size: Medium Adult   Pulse: 78   Resp: 16   Temp: 97.3 °F (36.3 °C)   TempSrc: Oral   SpO2: 94%   Weight: 191 lb 3.2 oz (86.7 kg)       General: Well nourished, well developed, no acute distress  Eyes:  PERRL   Conjunctiva unremarkable   ENT:  TM's intact bilaterally, no effusion   Nasal:  No mucosal edema     No nasal drainage   Oral:  mucosa moist and pink   Neck:  Supple   No palpable cervical lymphoadenopathy   Thyroid without mass or enlargement  Resp: Lungs CTAB   Equal and adequate air exchange without accessory muscle use   No rales, rhonchi or wheeze  CV: S1S2 RRR   No murmur   Intact distal pulses   No edema  GI: Abdomen Soft, non tender, non distended   Normoactive bowel sounds   No palpable hepatosplenomegaly  MS: Physiologic ROM of all extremities    Intact distal pulses   No clubbing or cyanosis   Skin Warm and dry; no rash or lesion  Neuro: Alert and oriented; normal and intact dtr's   Psych: Euthymic mood, congruent affect      No results found.      Assessment/Plan:  3 60-year-old female history of chronic renal failure, transplant candidate. We shall plan for colonoscopy for screening purposes. The risks and benefits of the procedure were explained to the patient, including risks of bleeding and perforation. The patient expressed understanding of these risks.         Electronically signed by Martell Ibrahim DO on 9/8/20 at 2:24 PM EDT

## 2020-09-08 NOTE — H&P
HISTORY OF PRESENT ILLNESS:    The patient is a 60 y.o. female who presents to discuss colonoscopy. Has been greater than 25 years since her last colonoscopy. She is presently candidate for renal transplant. Colonoscopy is indicated preoperatively for evaluation. She does complain of occasional constipation. She denies any family history of colon cancer. She denies any rectal bleeding. Past Medical History:   Diagnosis Date    Acute infection of bone ([address]     infection of rt foot, resolved.     Anemia of chronic disease     Breast cancer ([address]     right breast, 2008/ bladder, 2006- last chemotherapy \"years ago\"    CAD (coronary artery disease)     Carpal tunnel syndrome     bilat - for OR left hand 3-17-20     Chronic diastolic CHF (congestive heart failure) ([address] 09/23/2014 9/23/14- echocardiogram revealed moderate LV concentric hypertrophy, stage III diastolic dysfunction, mild tricuspid regurgitation    CKD (chronic kidney disease) stage 4, GFR 15-29 ml/min ([facility])     Diabetic retinopathy ([address]     Glaucoma     Hemodialysis patient ([address]     [facility]- Dr. Bernie White - left arm fistula     Hyperkalemia, diminished renal excretion 11/9/2017    Hypertension     Hypoglycemia unawareness in type 1 diabetes mellitus ([address] 11/7/2017    Insulin dependent type 2 diabetes mellitus ([address]     Neuropathy     feet    Osteomyelitis due to secondary diabetes ([address]     rt great toe with amputation    Patient is Sabianist 11/7/2017    Refusal of blood product     patient states she dose not take blood transfusion    Ventricular hypertrophy     Vitreous hemorrhage ([address]     left eye       Past Surgical History:   Procedure Laterality Date    AMPUTATION      right great toe    ANKLE SURGERY      correction on charcot joint of right ankle    CARPAL TUNNEL RELEASE Left 3/17/2020    LEFT CARPAL TUNNEL RELEASE performed by Bianca Rose MD at [address] bilateral    CHOLECYSTECTOMY      COLONOSCOPY      CYSTOSCOPY      DIALYSIS FISTULA CREATION Left 01/31/2018    upper arm/Dr. Freddy Kwan    ECHO COMPL W DOP COLOR FLOW  2/14/2013         ECHO COMPLETE  9/17/2013         MASTECTOMY      right    OTHER SURGICAL HISTORY  9/27/2011    PPV, membranectomy, laser Right eye    OTHER SURGICAL HISTORY  insertion lumbar drain insertion    10/12/`14    OTHER SURGICAL HISTORY  10/22/15    percutaneous lead placement for spinal cord stimulator    OTHER SURGICAL HISTORY  11/03/2015    Spinal; cord stimulator- turned off as of 3-10-20     MA AV ANAST,UP ARM BASILIC VEIN TRANSPOSIT Left 5/15/2018    TRANSPOSITION STAGE II AV FISTULA - LEFT UPPER ARM performed by Vincenzo Sicard, MD at [address] [facility] ANGIOACCESS AV FISTULA Left 9/25/2018    SUPERFICIALIZATION AV FISTULA - LEFT ARM performed by Vincenzo Sicard, MD at [address] [facility] W/VITRECTOMY ANY METH Left 4/10/2018    PARS PLANA VITRECTOMY 25 GAUGE RETINAL DETACHMENT REPAIR air fluid exchange, endolaser performed by Aruna Daly MD at [address] [facility] [address]  11/15/2017    VITRECTOMY Left 04/10/2018    PARS PLANA VITRECTOMY; RETINAL DETACHMENT REPAIR; GAS BUBBLE; LASER LEFT EYE       Prior to Admission medications    Medication Sig Start Date End Date Taking?  Authorizing Provider   Insulin Pen Needle (PEN NEEDLES) 31G X 6 MM MISC 1 each by Does not apply route daily 8/6/20  Yes Krystin García MD   allopurinol (ZYLOPRIM) 100 MG tablet Take 1 tablet by mouth daily 8/6/20  Yes Krystin García MD   LANTUS SOLOSTAR 100 UNIT/ML injection pen inject 11 units into the skin nightly 7/22/20  Yes Krystin García MD   folic acid (FOLVITE) 1 MG tablet TAKE 1 TABLET BY MOUTH 5 TIMES DAILY 7/16/20  Yes Krystin García MD   isosorbide mononitrate (IMDUR) 30 MG extended release tablet Take 1 tablet by mouth daily 6/23/20  Yes Krystin García nosebleeds, sore throat and tinnitus. Eyes: Negative for photophobia and redness. Respiratory: Negative for cough, shortness of breath and wheezing. Cardiovascular: Negative for chest pain and palpitations. Gastrointestinal: Negative for abdominal pain, blood in stool, constipation, diarrhea, nausea and vomiting. Endocrine: Negative for polydipsia. Genitourinary: Negative for dysuria, hematuria and urgency. Musculoskeletal: Negative for back pain and neck pain. Skin: Negative for rash. Neurological: Negative for dizziness, tremors and seizures. Hematological: Does not bruise/bleed easily. All other systems reviewed and are negative. Physical Exam:  Vitals:    09/02/20 1448   BP: 112/61   Site: Right Upper Arm   Position: Sitting   Cuff Size: Medium Adult   Pulse: 78   Resp: 16   Temp: 97.3 °F (36.3 °C)   TempSrc: Oral   SpO2: 94%   Weight: 191 lb 3.2 oz (86.7 kg)       General: Well nourished, well developed, no acute distress  Eyes:  PERRL   Conjunctiva unremarkable   ENT:  TM's intact bilaterally, no effusion   Nasal:  No mucosal edema     No nasal drainage   Oral:  mucosa moist and pink   Neck:  Supple   No palpable cervical lymphoadenopathy   Thyroid without mass or enlargement  Resp: Lungs CTAB   Equal and adequate air exchange without accessory muscle use   No rales, rhonchi or wheeze  CV: S1S2 RRR   No murmur   Intact distal pulses   No edema  GI: Abdomen Soft, non tender, non distended   Normoactive bowel sounds   No palpable hepatosplenomegaly  MS: Physiologic ROM of all extremities    Intact distal pulses   No clubbing or cyanosis   Skin Warm and dry; no rash or lesion  Neuro: Alert and oriented; normal and intact dtr's   Psych: Euthymic mood, congruent affect      No results found. Assessment/Plan:  3 29-year-old female history of chronic renal failure, transplant candidate. We shall plan for colonoscopy for screening purposes.      The risks and benefits of the procedure were explained to the patient, including risks of bleeding and perforation. The patient expressed understanding of these risks.

## 2020-09-17 ENCOUNTER — HOSPITAL ENCOUNTER (OUTPATIENT)
Age: 65
Discharge: HOME | End: 2020-09-19
Payer: COMMERCIAL

## 2020-09-17 PROCEDURE — U0003 INFECTIOUS AGENT DETECTION BY NUCLEIC ACID (DNA OR RNA); SEVERE ACUTE RESPIRATORY SYNDROME CORONAVIRUS 2 (SARS-COV-2) (CORONAVIRUS DISEASE [COVID-19]), AMPLIFIED PROBE TECHNIQUE, MAKING USE OF HIGH THROUGHPUT TECHNOLOGIES AS DESCRIBED BY CMS-2020-01-R: HCPCS

## 2020-09-18 LAB
SARS-COV-2: NOT DETECTED
SOURCE: NORMAL

## 2020-09-23 ENCOUNTER — TELEPHONE (OUTPATIENT)
Dept: SURGERY | Age: 65
End: 2020-09-23

## 2020-09-23 NOTE — TELEPHONE ENCOUNTER
Received a call from [facility] preadmission testing stating that pt needs cardiac clearance for her procedure tomorrow with .  Gave call back number as [phone]  Electronically signed by Gaye Stahl MA on 9/23/20 at 3:25 PM EDT

## 2020-09-24 NOTE — TELEPHONE ENCOUNTER
Procedure canceled will reach out to cardiology to see about clearance  Electronically signed by Maria M Qiuck MA on 9/24/20 at 12:34 PM EDT

## 2020-10-08 ENCOUNTER — TELEPHONE (OUTPATIENT)
Dept: CARDIOLOGY CLINIC | Age: 65
End: 2020-10-08

## 2020-10-08 NOTE — TELEPHONE ENCOUNTER
Patient Appointment Form:      PCP: Dr Carolin Homans  Referring: Dr Heriberto Gordon    Has the Patient:    Seen a Cardiologist? yes    date:04-22-15  Physician:Dr Cris Cervantes  location:[place]    Had a heart catheterization? yes   date: 08-30-11  Hospital:  [facility]    Had heart surgery? no    Had a stress test or nuclear stress test? yes   date: 08-28-11   facility name:  [facility]    Had an echocardiogram? yes   date: 11-07-17   facility name:  [facility]    Had a vascular ultrasound?  yes   date: 05-24-18      facility name:  [facility]    Had a 24/48 heart monitor or extended cardiac event monitor? no    Had recent blood work in the last 6 months? yes    date: 03-03-20    ordering physician: in [facility]    Had a pacemaker/ICD/ILR implant? no    Seen an Electrophysiologist? no        Will send records via: in [facility] [address]      Date & time of appointment:  10-13-20   0900      Dr Cris Cervantes

## 2020-10-12 RX ORDER — ASPIRIN 81 MG/1
TABLET, COATED ORAL
Qty: 30 TABLET | Refills: 0 | Status: SHIPPED
Start: 2020-10-12 | End: 2021-02-25

## 2020-10-12 NOTE — TELEPHONE ENCOUNTER
Last Appointment   8/6/2020  Next Appointment  Visit date not found     Return in about 6 months (around 2/6/2021) for Blood Pressure Check, diabetes.

## 2020-10-13 ENCOUNTER — OFFICE VISIT (OUTPATIENT)
Dept: CARDIOLOGY CLINIC | Age: 65
End: 2020-10-13
Payer: COMMERCIAL

## 2020-10-13 VITALS
WEIGHT: 191 LBS | BODY MASS INDEX: 27.35 KG/M2 | HEIGHT: 70 IN | HEART RATE: 75 BPM | RESPIRATION RATE: 14 BRPM | SYSTOLIC BLOOD PRESSURE: 110 MMHG | DIASTOLIC BLOOD PRESSURE: 64 MMHG

## 2020-10-13 PROCEDURE — 93005 ELECTROCARDIOGRAM TRACING: CPT | Performed by: INTERNAL MEDICINE

## 2020-10-13 PROCEDURE — G8484 FLU IMMUNIZE NO ADMIN: HCPCS | Performed by: INTERNAL MEDICINE

## 2020-10-13 PROCEDURE — 99202 OFFICE O/P NEW SF 15 MIN: CPT | Performed by: INTERNAL MEDICINE

## 2020-10-13 PROCEDURE — G9899 SCRN MAM PERF RSLTS DOC: HCPCS | Performed by: INTERNAL MEDICINE

## 2020-10-13 PROCEDURE — G8427 DOCREV CUR MEDS BY ELIG CLIN: HCPCS | Performed by: INTERNAL MEDICINE

## 2020-10-13 PROCEDURE — G8419 CALC BMI OUT NRM PARAM NOF/U: HCPCS | Performed by: INTERNAL MEDICINE

## 2020-10-13 RX ORDER — FOLIC ACID/VIT B COMPLEX AND C 0.8 MG
0.8 TABLET ORAL
COMMUNITY
End: 2021-05-19

## 2020-10-13 NOTE — PROGRESS NOTES
[facility]  Levine, Susan. \Hospital Has a New Name and Outlook.\""batsheva. NAHOMI Li M.D.  Roney Jackson. Chioma Meyers M.D. Tiara Arzola M.D. Jesse Garber M.D. MD Griffin Good Rashard Juan Carlos Sauce   1955  Catherine Dobbins MD    This 61-year-old woman is seen for outpatient cardiac consultation today. She has a history of hypertension, hyperlipidemia, obesity and breast cancer. She has had chronic renal failure. She has been declined renal transplant because she is a Buddhist. She follows with . She is followed at the [facility]. She has severe concentric LVH in 2011. Cardiac catheterization in 2012 showed mild ASCVD. Today she reports being referred because of \"cardiac clearance\" for colonoscopy. Medical History:  1. 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 2. Hypertension. 3. Hyperlipidemia. 4. Obesity. 5. Right breast cancer with mastectomy  2005 followed by radiation and Arimidex. Chronic  lymphedema  right arm. 6. [facility] admission with SOB and chest discomfort 08/2011. Cardiac enzymes negative. CT angiogram of the chest negative for PE and dissection. EKG:  NSR, nonspecific ST T abnormalities. 7. Echo 08/2011 with severe concentric LVH, normal wall motion. LAE, RVE, Stage I diastolic dysfunction. 8. MPS 08/2011 with small area of lateral ischemia, normal wall motion and EF. 9. Cardiac catheterization 08/30/2012:  CX 90% mid stenosis, OM2 totally occluded. LAD 20% stenosis, RVA 50% stenosis, LV normal.  She was treated medically. 10. [facility] admission 02/13/2013 with SOB, orthopnea, nonproductive nocturnal cough and edema. CXR:  cardiomegaly and bilateral effusion. EKG:  NSR, LAE, low voltage, PVC, poor R wave progression. BUN 23, Cr 1.5, BNP 1868. Cardiac enzymes negative. Hemoglobin 9.9, WBC 5.4. Rx with aggressive diuretic therapy.     11. Echo 02/14/2013:  LVH, normal LV systolic function, Stage II diastolic dysfunction, LAE, normal RVSP, no valvulopathy. 12. Lifetime non-smoker. 13. NKDA. She had worsening renal function in the past when taking lasix. 14.  echo 11/2017, EF 55%. Mild LVH. Stage II diastolic dysfunction. Moderate to severe left atrial dilatation. Mild MR.  RVSP 76.  15. abdominal CT 2017: Extensive anasarca. Extensive arteriosclerotic disease. 16. Surgical history: Correction of Charcot joint right, carpal tunnel release, cholecystectomy, dialysis fistula creation, mastectomy, laser treatment of eyes, spinal cord stimulator, surgery for retinal detachment. Spinal cord decompression L2. The patient is single. Non-smoker. Uses a walker. (Has a history of disc surgery)    Family history: Not available    Review of Systems:  Constitutional: negative for fever and chills  Respiratory: negative for cough and hemoptysis  Cardiovascular:   Gastrointestinal: negative for abdominal pain, diarrhea, nausea and vomiting  Genitourinary:negative for dysuria and hematuria  Derm: negative for rash and skin lesion(s)  Neurological: negative for seizures and tremors  Endocrine: negative for diabetic symptoms including polydipsia and polyuria  Musculoskeletal: negative for CTD  Psychiatric: negative for psychosis and major depression    On examination, she is an alert pleasant middle-aged -American female in no distress   skin is warm and dry. Respirations are unlabored. /64   Pulse 75   Resp 14   Ht 5' 10\" (1.778 m)   Wt 191 lb (86.6 kg)   BMI 27.41 kg/m² . HEENT negative for scleral icterus. Extraocular muscles intact. No facial asymmetry or central cyanosis. Neck without masses or goiter. No bruit or JVD. Cardiac apex not displaced. Rhythm regular. Abdomen normal.  Extremities right arm edema. Both lower extremities normal.  Lungs clear.     EKG today per Dr. Amadou Dyer interpretation:    The patient clinically stable from a cardiovascular standpoint and is considered a low cardiac risk for the proposed colonoscopy. She is not a candidate for renal transplant. If necessary for the colonoscopy aspirin may be discontinued 5 days prior. At completion of today's visit, medications include the following:    Current Outpatient Medications:     B Complex-C-Folic Acid (RENAL-FADI) 0.8 MG TABS, Take 0.8 tablets by mouth, Disp: , Rfl:     ASPIRIN LOW DOSE 81 MG EC tablet, TAKE ONE TABLET BY MOUTH DAILY, Disp: 30 tablet, Rfl: 0    Insulin Pen Needle (PEN NEEDLES) 31G X 6 MM MISC, 1 each by Does not apply route daily, Disp: 100 each, Rfl: 5    allopurinol (ZYLOPRIM) 100 MG tablet, Take 1 tablet by mouth daily, Disp: 90 tablet, Rfl: 0    LANTUS SOLOSTAR 100 UNIT/ML injection pen, inject 11 units into the skin nightly, Disp: 15 mL, Rfl: 0    isosorbide mononitrate (IMDUR) 30 MG extended release tablet, Take 1 tablet by mouth daily, Disp: 60 tablet, Rfl: 2    LUMIGAN 0.01 % SOLN ophthalmic drops, instill 1 drop every evening into right eye, Disp: , Rfl:     omeprazole (PRILOSEC) 20 MG delayed release capsule, TAKE ONE CAPSULE BY MOUTH EVERY DAY at bedtime, Disp: 30 capsule, Rfl: 5    cyanocobalamin 1000 MCG/ML injection, Inject 1,000 mcg into the muscle once Once a month at dialysis, Disp: , Rfl:     VELPHORO 500 MG CHEW, CRUSH OR CHEW AND SWALLOW 2 TABLETS 3 TIMES A DAY WITH MEALS, Disp: , Rfl:     gabapentin (NEURONTIN) 100 MG capsule, Take 2 capsules by mouth 3 times daily for 30 days. (Patient taking differently: Take 200 mg by mouth as needed. ), Disp: 90 capsule, Rfl: 1    lidocaine-prilocaine (EMLA) 2.5-2.5 % cream, APPLY SMALL AMOUNT TO ACCESS SITE (AVF) 1 HOUR BEFORE DIALYSIS. COVER WITH OCCLUSIVE DRESSING (SARAN WRAP), Disp: , Rfl: 6    bumetanide (BUMEX) 2 MG tablet, TAKE 1 TABLET BY MOUTH THREE TIMES A WEEK.  (Patient taking differently: Take 2 mg by mouth three times a week Sund, Tues, Thurs,), Disp: 38 tablet, Rfl: 4    COMBIGAN 0.2-0.5 % ophthalmic solution, INSTILL ONE DROP INTO THE RIGHT EYE TWICE A DAY, Disp: , Rfl: 7    camphor-menthol (SARNA) 0.5-0.5 % lotion, Apply topically as needed. , Disp: 1 Bottle, Rfl: 4    insulin lispro (HUMALOG KWIKPEN) 100 UNIT/ML pen, Take 2 units > 140 , take 3 units > 170, take 4 units > 200 units (Patient taking differently: Sliding scale as needed with meals), Disp: 5 pen, Rfl: 5    blood glucose test strips (ACCU-CHEK ACTIVE STRIPS) strip, 1 each by In Vitro route 2 times daily As needed. , Disp: 100 each, Rfl: 5    sevelamer (RENVELA) 800 MG tablet, Take 1 tablet by mouth 3 times daily (with meals), Disp: , Rfl:     darbepoetin fani-polysorbate (ARANESP) 40 MCG/0.4ML SOLN injection, Inject 0.4 mLs into the skin once a week. (Patient taking differently: Inject 40 mcg into the skin every 14 days ), Disp: 8.4 mL, Rfl: 0    atorvastatin (LIPITOR) 40 MG tablet, TAKE ONE TABLET BY MOUTH EVERY DAY, Disp: 60 tablet, Rfl: 1      Note: This report was completed utilizing computer voice recognition software. Every effort has been made to ensure accuracy, however; inadvertent computerized transcription errors may be present.     --Roya West MD on 10/13/2020 at 5:12 PM     CC Dr Marylou Marcial

## 2020-10-21 ENCOUNTER — OFFICE VISIT (OUTPATIENT)
Dept: ORTHOPEDIC SURGERY | Age: 65
End: 2020-10-21
Payer: COMMERCIAL

## 2020-10-21 VITALS — WEIGHT: 191 LBS | BODY MASS INDEX: 27.35 KG/M2 | TEMPERATURE: 97.2 F | HEIGHT: 70 IN

## 2020-10-21 PROCEDURE — 3017F COLORECTAL CA SCREEN DOC REV: CPT | Performed by: ORTHOPAEDIC SURGERY

## 2020-10-21 PROCEDURE — 20610 DRAIN/INJ JOINT/BURSA W/O US: CPT | Performed by: ORTHOPAEDIC SURGERY

## 2020-10-21 PROCEDURE — G8427 DOCREV CUR MEDS BY ELIG CLIN: HCPCS | Performed by: ORTHOPAEDIC SURGERY

## 2020-10-21 PROCEDURE — 99213 OFFICE O/P EST LOW 20 MIN: CPT | Performed by: ORTHOPAEDIC SURGERY

## 2020-10-21 PROCEDURE — G8419 CALC BMI OUT NRM PARAM NOF/U: HCPCS | Performed by: ORTHOPAEDIC SURGERY

## 2020-10-21 PROCEDURE — G9899 SCRN MAM PERF RSLTS DOC: HCPCS | Performed by: ORTHOPAEDIC SURGERY

## 2020-10-21 PROCEDURE — 1036F TOBACCO NON-USER: CPT | Performed by: ORTHOPAEDIC SURGERY

## 2020-10-21 PROCEDURE — G8484 FLU IMMUNIZE NO ADMIN: HCPCS | Performed by: ORTHOPAEDIC SURGERY

## 2020-10-21 RX ORDER — TRIAMCINOLONE ACETONIDE 40 MG/ML
40 INJECTION, SUSPENSION INTRA-ARTICULAR; INTRAMUSCULAR ONCE
Status: COMPLETED | OUTPATIENT
Start: 2020-10-21 | End: 2020-10-21

## 2020-10-21 RX ORDER — LIDOCAINE HYDROCHLORIDE 10 MG/ML
4 INJECTION, SOLUTION INFILTRATION; PERINEURAL ONCE
Status: COMPLETED | OUTPATIENT
Start: 2020-10-21 | End: 2020-10-21

## 2020-10-21 RX ADMIN — TRIAMCINOLONE ACETONIDE 40 MG: 40 INJECTION, SUSPENSION INTRA-ARTICULAR; INTRAMUSCULAR at 15:43

## 2020-10-21 RX ADMIN — LIDOCAINE HYDROCHLORIDE 4 ML: 10 INJECTION, SOLUTION INFILTRATION; PERINEURAL at 15:43

## 2020-10-21 NOTE — PROGRESS NOTES
Assisted Dr. Abdullahi Olivares with injection of 4 cc lidocaine/1 cc kenalog in R shoulder. Patient tolerated procedure well. Verbal instructions were given.

## 2020-10-21 NOTE — PROGRESS NOTES
Follow Up Visit     Enid Jessica returns today for follow up visit regarding right shoulder end-stage osteoarthritis. Treatment thus far has included cortisone injections and physical therapy, last injection was 6/22/20 with very minimal relief. She has been doing home exercises instead due to her dialysis schedule She reports symptoms are unchanged. Physical Exam:     Height: 5' 10\" (1.778 m), Weight: 191 lb (86.6 kg)    General: Alert and oriented x3, no acute distress  Cardiovascular/pulmonary: No labored breathing, peripheral perfusion intact  Musculoskeletal:    Exam of the shoulder displays significant limitations with range of motion with very prominent crepitus of the glenohumeral joint. Controlled Substances Monitoring:      Imaging:  X-rays reviewed showing right shoulder osteoarthritis end-stage    Procedure Note:  Right Shoulder steroid injection     The Right shoulder was identified as the injection site. The risk and benefits of a cortisone injection were explained and the patient consented to the injection. Under sterile conditions, the subacromial space was injected from posterior approach with a mixture of 40 mg of Kenalog, 4 cc  1% Lidocaine without complication. A sterile bandage was applied. Administrations This Visit     lidocaine 1 % injection 4 mL     Admin Date  10/21/2020 Action  Given Dose  4 mL Route  Intra-articular Administered By  Katina Oconnor RN          triamcinolone acetonide VIA [facility]) injection 40 mg     Admin Date  10/21/2020 Action  Given Dose  40 mg Route  Intra-articular Administered By  Katina Oconnor RN                  Assessment: Right shoulder osteoarthritis      Plan:   We discussed her shoulder again today. She continues to not be a candidate for shoulder replacement as she is awaiting possible kidney transplant and is on dialysis. We did offer her a shot today to hopefully provide her some relief and she was agreeable.   She will

## 2020-11-23 RX ORDER — OMEPRAZOLE 20 MG/1
CAPSULE, DELAYED RELEASE ORAL
Qty: 30 CAPSULE | Refills: 0 | Status: SHIPPED
Start: 2020-11-23 | End: 2021-05-19

## 2020-11-24 RX ORDER — ALLOPURINOL 100 MG/1
100 TABLET ORAL DAILY
Qty: 90 TABLET | Refills: 0 | Status: SHIPPED
Start: 2020-11-24 | End: 2021-03-05 | Stop reason: SDUPTHER

## 2020-11-24 RX ORDER — INSULIN GLARGINE 100 [IU]/ML
INJECTION, SOLUTION SUBCUTANEOUS
Qty: 15 ML | Refills: 3 | Status: SHIPPED
Start: 2020-11-24 | End: 2021-02-25

## 2020-12-01 ENCOUNTER — TELEPHONE (OUTPATIENT)
Dept: CARDIOLOGY | Age: 65
End: 2020-12-01

## 2020-12-01 NOTE — TELEPHONE ENCOUNTER
SPOKE WITH PATIENT CONCERNING SCHEDULING ECHO. PATIENT STATES THAT SHE COMPLETED THE ECHO AT  DUE TO THE FACT THAT SHE IS AWAITING TRANSPLANT.

## 2020-12-17 RX ORDER — BUMETANIDE 2 MG/1
2 TABLET ORAL
Qty: 60 TABLET | Refills: 1 | Status: SHIPPED
Start: 2020-12-18 | End: 2021-05-19

## 2021-01-04 RX ORDER — ISOSORBIDE MONONITRATE 30 MG/1
TABLET, EXTENDED RELEASE ORAL
Qty: 60 TABLET | Refills: 0 | Status: SHIPPED
Start: 2021-01-04 | End: 2021-03-05 | Stop reason: SDUPTHER

## 2021-01-08 ENCOUNTER — OFFICE VISIT (OUTPATIENT)
Dept: PRIMARY CARE CLINIC | Age: 66
End: 2021-01-08
Payer: COMMERCIAL

## 2021-01-08 VITALS
HEIGHT: 70 IN | BODY MASS INDEX: 26.92 KG/M2 | DIASTOLIC BLOOD PRESSURE: 73 MMHG | TEMPERATURE: 97.6 F | WEIGHT: 188 LBS | SYSTOLIC BLOOD PRESSURE: 145 MMHG

## 2021-01-08 DIAGNOSIS — J02.0 STREP PHARYNGITIS: Primary | ICD-10-CM

## 2021-01-08 LAB — S PYO AG THROAT QL: POSITIVE

## 2021-01-08 PROCEDURE — G8484 FLU IMMUNIZE NO ADMIN: HCPCS | Performed by: PHYSICIAN ASSISTANT

## 2021-01-08 PROCEDURE — 1123F ACP DISCUSS/DSCN MKR DOCD: CPT | Performed by: PHYSICIAN ASSISTANT

## 2021-01-08 PROCEDURE — G8400 PT W/DXA NO RESULTS DOC: HCPCS | Performed by: PHYSICIAN ASSISTANT

## 2021-01-08 PROCEDURE — 4040F PNEUMOC VAC/ADMIN/RCVD: CPT | Performed by: PHYSICIAN ASSISTANT

## 2021-01-08 PROCEDURE — G8427 DOCREV CUR MEDS BY ELIG CLIN: HCPCS | Performed by: PHYSICIAN ASSISTANT

## 2021-01-08 PROCEDURE — 1090F PRES/ABSN URINE INCON ASSESS: CPT | Performed by: PHYSICIAN ASSISTANT

## 2021-01-08 PROCEDURE — G8417 CALC BMI ABV UP PARAM F/U: HCPCS | Performed by: PHYSICIAN ASSISTANT

## 2021-01-08 PROCEDURE — 99213 OFFICE O/P EST LOW 20 MIN: CPT | Performed by: PHYSICIAN ASSISTANT

## 2021-01-08 PROCEDURE — 3017F COLORECTAL CA SCREEN DOC REV: CPT | Performed by: PHYSICIAN ASSISTANT

## 2021-01-08 PROCEDURE — 1036F TOBACCO NON-USER: CPT | Performed by: PHYSICIAN ASSISTANT

## 2021-01-08 PROCEDURE — 87880 STREP A ASSAY W/OPTIC: CPT | Performed by: PHYSICIAN ASSISTANT

## 2021-01-08 RX ORDER — CEPHALEXIN 500 MG/1
500 CAPSULE ORAL 2 TIMES DAILY
Qty: 20 CAPSULE | Refills: 0 | Status: SHIPPED | OUTPATIENT
Start: 2021-01-08 | End: 2021-01-18

## 2021-01-08 NOTE — PROGRESS NOTES
Chief Complaint:   Pharyngitis (since last night, hot tea) and Adenopathy      History of Present Illness   Source of history provided by:  patient. Carlos Alberto Weston is a 73 y.o. old female who has a past medical history of:   Past Medical History:   Diagnosis Date    Acute infection of bone ([address]     infection of rt foot, resolved.  Anemia of chronic disease     Breast cancer ([address]     right breast, 2008/ bladder, 2006- last chemotherapy \"years ago\"    CAD (coronary artery disease)     Carpal tunnel syndrome     bilat - for OR left hand 3-17-20     Chronic diastolic CHF (congestive heart failure) ([address] 09/23/2014 9/23/14- echocardiogram revealed moderate LV concentric hypertrophy, stage III diastolic dysfunction, mild tricuspid regurgitation    CKD (chronic kidney disease) stage 4, GFR 15-29 ml/min ([facility])     Diabetic retinopathy ([address]     Glaucoma     Hemodialysis patient ([address]     [facility]- Dr. Hinds [facility] - left arm fistula     Hyperkalemia, diminished renal excretion 11/9/2017    Hypertension     Hypoglycemia unawareness in type 1 diabetes mellitus ([address] 11/7/2017    Insulin dependent type 2 diabetes mellitus ([address]     Neuropathy     feet    Osteomyelitis due to secondary diabetes ([address]     rt great toe with amputation    Patient is Anglican 11/7/2017    Refusal of blood product     patient states she dose not take blood transfusion    Ventricular hypertrophy     Vitreous hemorrhage ([address]     left eye   Presents to the walk in clinic for evaluation of sore throat last night. Patient denies any additional symptoms. Denies any fever, chills, dyspnea, dysphagia, CP, SOB, cough, nausea, vomiting, rash, or lethargy. Denies any known strep exposures. Denies any contact with any individuals with known COVID-19 infection or under investigation for COVID-19 infection.      ROS    Unless otherwise stated in this report or unable to obtain because of the patient's clinical or mental status as evidenced by the medical record, this patients's positive and negative responses for Review of Systems, constitutional, psych, eyes, ENT, cardiovascular, respiratory, gastrointestinal, neurological, genitourinary, musculoskeletal, integument systems and systems related to the presenting problem are either stated in the preceding or were not pertinent or were negative for the symptoms and/or complaints related to the medical problem. Past Medical History:  has a past medical history of Acute infection of bone ([address] Anemia of chronic disease, Breast cancer ([address] CAD (coronary artery disease), Carpal tunnel syndrome, Chronic diastolic CHF (congestive heart failure) ([address] CKD (chronic kidney disease) stage 4, GFR 15-29 ml/min ([address] Diabetic retinopathy ([address] Glaucoma, Hemodialysis patient ([address] Hyperkalemia, diminished renal excretion, Hypertension, Hypoglycemia unawareness in type 1 diabetes mellitus ([address] Insulin dependent type 2 diabetes mellitus ([address] Neuropathy, Osteomyelitis due to secondary diabetes [facility]), Patient is Alevism, Refusal of blood product, Ventricular hypertrophy, and Vitreous hemorrhage ([address] Past Surgical History:  has a past surgical history that includes Cholecystectomy; amputation; Mastectomy; Cystoscopy; Cataract removal; Ankle surgery; other surgical history (9/27/2011); ECHO Compl W Dop Color Flow (2/14/2013); Echo Complete (9/17/2013); spinal cord decompression; other surgical history (insertion lumbar drain insertion); other surgical history (10/22/15); other surgical history (11/03/2015); Tunneled venous catheter placement (11/15/2017); Dialysis fistula creation (Left, 01/31/2018); vitrectomy (Left, 04/10/2018); pr rpr retinal dtchmnt w/vitrectomy any meth (Left, 4/10/2018); pr av anast,up arm basilic vein transposit (Left, 5/15/2018); pr ligatn angioaccess av fistula (Left, 9/25/2018);  Colonoscopy; and Carpal tunnel release (Left, 3/17/2020). Social History:  reports that she has never smoked. She has never used smokeless tobacco. She reports that she does not drink alcohol or use drugs. Family History: family history includes Breast Cancer (age of onset: 61) in her maternal grandmother and mother; Heart Disease in her father; Hypertension in her mother; Prostate Cancer in her father. Allergies: Lasix [furosemide]    Physical Exam         VS:  BP (!) 145/73   Temp 97.6 °F (36.4 °C) (Temporal)   Ht 5' 10\" (1.778 m)   Wt 188 lb (85.3 kg)   BMI 26.98 kg/m²    Oxygen Saturation Interpretation: Normal.    Constitutional:  Alert, development consistent with age. .  Throat: Airway patent. Posterior pharynx with moderate erythema and tonsillar hypertrophy. No exudate noted. Neck:  Supple with full ROM. There is tender anterior bilateral adenopathy. Lungs:  Clear to auscultation and breath sounds equal.    CV: Regular rate and rhythm, normal heart sounds, without pathological murmurs, ectopy, gallops, or rubs. Skin:  No rashes, erythema present. Lymphatics: No lymphangitis or adenopathy noted other then stated above. Neurological:  Alert and orientated. Motor functions intact. Responds to commands. Lab / Imaging Results   (All laboratory and radiology results have been personally reviewed by myself)  Labs:  Results for orders placed or performed in visit on 01/08/21   POCT rapid strep A   Result Value Ref Range    Strep A Ag Positive (A) None Detected       Imaging: All Radiology results interpreted by Radiologist unless otherwise noted. No orders to display       Assessment / Plan     Impression(s):  Claudia was seen today for pharyngitis and adenopathy. Diagnoses and all orders for this visit:    Strep pharyngitis  -     POCT rapid strep A  -     cephALEXin (KEFLEX) 500 MG capsule; Take 1 capsule by mouth 2 times daily for 10 days      Disposition:  Disposition: Discharge to home. Rapid strep came back positive. Script written for Keflex, side effects discussed. Increase fluids and rest. Tylenol prn pain/fever. F/u PCP in 5-7 days if symptoms persist. ED sooner if symptoms worsen or change. ED immediately with the development of refractory fever, shaking chills, dyspnea, dysphagia, neck stiffness, vomiting, etc. Pt is in agreement with this care plan. All questions answered. Hellen Coto PA-C    This visit was provided as a focused evaluation during the COVID -19 pandemic/national emergency. A comprehensive review of all previous patient history and testing was not conducted. Pertinent findings were elicited during the visit.

## 2021-01-20 ENCOUNTER — OFFICE VISIT (OUTPATIENT)
Dept: ORTHOPEDIC SURGERY | Age: 66
End: 2021-01-20
Payer: COMMERCIAL

## 2021-01-20 VITALS — WEIGHT: 188 LBS | HEIGHT: 70 IN | TEMPERATURE: 97.9 F | BODY MASS INDEX: 26.92 KG/M2

## 2021-01-20 DIAGNOSIS — M19.011 OSTEOARTHRITIS OF RIGHT SHOULDER, UNSPECIFIED OSTEOARTHRITIS TYPE: Primary | ICD-10-CM

## 2021-01-20 PROCEDURE — G8417 CALC BMI ABV UP PARAM F/U: HCPCS | Performed by: ORTHOPAEDIC SURGERY

## 2021-01-20 PROCEDURE — 1123F ACP DISCUSS/DSCN MKR DOCD: CPT | Performed by: ORTHOPAEDIC SURGERY

## 2021-01-20 PROCEDURE — 1090F PRES/ABSN URINE INCON ASSESS: CPT | Performed by: ORTHOPAEDIC SURGERY

## 2021-01-20 PROCEDURE — 1036F TOBACCO NON-USER: CPT | Performed by: ORTHOPAEDIC SURGERY

## 2021-01-20 PROCEDURE — G8427 DOCREV CUR MEDS BY ELIG CLIN: HCPCS | Performed by: ORTHOPAEDIC SURGERY

## 2021-01-20 PROCEDURE — 4040F PNEUMOC VAC/ADMIN/RCVD: CPT | Performed by: ORTHOPAEDIC SURGERY

## 2021-01-20 PROCEDURE — G8400 PT W/DXA NO RESULTS DOC: HCPCS | Performed by: ORTHOPAEDIC SURGERY

## 2021-01-20 PROCEDURE — 20610 DRAIN/INJ JOINT/BURSA W/O US: CPT | Performed by: ORTHOPAEDIC SURGERY

## 2021-01-20 PROCEDURE — 99213 OFFICE O/P EST LOW 20 MIN: CPT | Performed by: ORTHOPAEDIC SURGERY

## 2021-01-20 PROCEDURE — 3017F COLORECTAL CA SCREEN DOC REV: CPT | Performed by: ORTHOPAEDIC SURGERY

## 2021-01-20 PROCEDURE — G8484 FLU IMMUNIZE NO ADMIN: HCPCS | Performed by: ORTHOPAEDIC SURGERY

## 2021-01-20 RX ORDER — LIDOCAINE HYDROCHLORIDE 10 MG/ML
4 INJECTION, SOLUTION INFILTRATION; PERINEURAL ONCE
Status: COMPLETED | OUTPATIENT
Start: 2021-01-20 | End: 2021-01-20

## 2021-01-20 RX ORDER — TRIAMCINOLONE ACETONIDE 40 MG/ML
40 INJECTION, SUSPENSION INTRA-ARTICULAR; INTRAMUSCULAR ONCE
Status: COMPLETED | OUTPATIENT
Start: 2021-01-20 | End: 2021-01-20

## 2021-01-20 RX ORDER — TRIAMCINOLONE ACETONIDE 1 MG/G
CREAM TOPICAL
COMMUNITY
Start: 2020-12-29 | End: 2021-05-19

## 2021-01-20 RX ADMIN — LIDOCAINE HYDROCHLORIDE 4 ML: 10 INJECTION, SOLUTION INFILTRATION; PERINEURAL at 15:19

## 2021-01-20 RX ADMIN — TRIAMCINOLONE ACETONIDE 40 MG: 40 INJECTION, SUSPENSION INTRA-ARTICULAR; INTRAMUSCULAR at 15:20

## 2021-01-20 NOTE — PROGRESS NOTES
Follow Up Visit     Cipriano Gardner returns today for follow up visit regarding right shoulder end-stage osteoarthritis. Treatment thus far has included cortisone injections and physical therapy, last injection was 6/22/20 and 10/21/2020. She reports symptoms are improved for short period of time and pain has returned. Physical Exam:     Height: 5' 10\" (1.778 m), Weight: 188 lb (85.3 kg)    General: Alert and oriented x3, no acute distress  Cardiovascular/pulmonary: No labored breathing, peripheral perfusion intact  Musculoskeletal:    Exam of the shoulder shows significant pain and crepitus with any motion. There is intact motor and sensory function. There is diffuse right upper extremity swelling    Controlled Substances Monitoring:      Imaging:  No new images. Previous images show right shoulder osteoarthritis    Procedure Note:  Right Shoulder steroid injection     The Right shoulder was identified as the injection site. The risk and benefits of a cortisone injection were explained and the patient consented to the injection. Under sterile conditions, the subacromial space was injected from posterior approach with a mixture of 40 mg of Kenalog, 4 cc  1% Lidocaine without complication. A sterile bandage was applied. Administrations This Visit     lidocaine 1 % injection 4 mL     Admin Date  01/20/2021 Action  Given Dose  4 mL Route  Intra-articular Administered By  Jericho Alfaro, RN          triamcinolone acetonide VIA [facility]) injection 40 mg     Admin Date  01/20/2021 Action  Given Dose  40 mg Route  Intra-articular Administered By  Jericho Alfaro, RN                  Assessment: Right stage osteoarthritis in end-stage renal disease patient on dialysis      Plan:   We discussed her shoulder today. She has been getting decent relief with steroid injections and would like to continue for the time being as she awaits a kidney transplant. We will see her back in 3 months as needed.  Right shoulder was injected with steroid today.     Reilly Carlos MD  Orthopaedic Surgery   1/20/21  3:25 PM

## 2021-01-20 NOTE — PROGRESS NOTES
Assisted Dr. Kehinde Baron with injection of 4 cc lidocaine/1 cc kenalog in R shoulder. Patient tolerated procedure well. Verbal instructions were given.

## 2021-01-22 DIAGNOSIS — R06.02 SHORTNESS OF BREATH: ICD-10-CM

## 2021-02-05 DIAGNOSIS — N18.4 TYPE 2 DIABETES MELLITUS WITH STAGE 4 CHRONIC KIDNEY DISEASE, UNSPECIFIED WHETHER LONG TERM INSULIN USE (HCC): ICD-10-CM

## 2021-02-05 DIAGNOSIS — E11.22 TYPE 2 DIABETES MELLITUS WITH STAGE 4 CHRONIC KIDNEY DISEASE, UNSPECIFIED WHETHER LONG TERM INSULIN USE (HCC): ICD-10-CM

## 2021-02-05 DIAGNOSIS — I25.10 ATHEROSCLEROSIS OF NATIVE CORONARY ARTERY OF NATIVE HEART WITHOUT ANGINA PECTORIS: ICD-10-CM

## 2021-02-05 RX ORDER — ATORVASTATIN CALCIUM 40 MG/1
TABLET, FILM COATED ORAL
Qty: 60 TABLET | Refills: 0 | Status: SHIPPED
Start: 2021-02-05 | End: 2021-03-08 | Stop reason: SDUPTHER

## 2021-02-10 ENCOUNTER — OFFICE VISIT (OUTPATIENT)
Dept: FAMILY MEDICINE CLINIC | Age: 66
End: 2021-02-10
Payer: COMMERCIAL

## 2021-02-10 VITALS
SYSTOLIC BLOOD PRESSURE: 119 MMHG | OXYGEN SATURATION: 99 % | TEMPERATURE: 97.8 F | BODY MASS INDEX: 26.92 KG/M2 | WEIGHT: 188 LBS | RESPIRATION RATE: 16 BRPM | HEART RATE: 75 BPM | DIASTOLIC BLOOD PRESSURE: 54 MMHG | HEIGHT: 70 IN

## 2021-02-10 DIAGNOSIS — Z01.818 PRE-OP EXAM: Primary | ICD-10-CM

## 2021-02-10 PROCEDURE — G8427 DOCREV CUR MEDS BY ELIG CLIN: HCPCS | Performed by: STUDENT IN AN ORGANIZED HEALTH CARE EDUCATION/TRAINING PROGRAM

## 2021-02-10 PROCEDURE — G8417 CALC BMI ABV UP PARAM F/U: HCPCS | Performed by: STUDENT IN AN ORGANIZED HEALTH CARE EDUCATION/TRAINING PROGRAM

## 2021-02-10 PROCEDURE — 99213 OFFICE O/P EST LOW 20 MIN: CPT | Performed by: STUDENT IN AN ORGANIZED HEALTH CARE EDUCATION/TRAINING PROGRAM

## 2021-02-10 PROCEDURE — 3017F COLORECTAL CA SCREEN DOC REV: CPT | Performed by: STUDENT IN AN ORGANIZED HEALTH CARE EDUCATION/TRAINING PROGRAM

## 2021-02-10 PROCEDURE — G8400 PT W/DXA NO RESULTS DOC: HCPCS | Performed by: STUDENT IN AN ORGANIZED HEALTH CARE EDUCATION/TRAINING PROGRAM

## 2021-02-10 PROCEDURE — G8484 FLU IMMUNIZE NO ADMIN: HCPCS | Performed by: STUDENT IN AN ORGANIZED HEALTH CARE EDUCATION/TRAINING PROGRAM

## 2021-02-10 PROCEDURE — 4040F PNEUMOC VAC/ADMIN/RCVD: CPT | Performed by: STUDENT IN AN ORGANIZED HEALTH CARE EDUCATION/TRAINING PROGRAM

## 2021-02-10 PROCEDURE — 1123F ACP DISCUSS/DSCN MKR DOCD: CPT | Performed by: STUDENT IN AN ORGANIZED HEALTH CARE EDUCATION/TRAINING PROGRAM

## 2021-02-10 PROCEDURE — 1036F TOBACCO NON-USER: CPT | Performed by: STUDENT IN AN ORGANIZED HEALTH CARE EDUCATION/TRAINING PROGRAM

## 2021-02-10 PROCEDURE — 1090F PRES/ABSN URINE INCON ASSESS: CPT | Performed by: STUDENT IN AN ORGANIZED HEALTH CARE EDUCATION/TRAINING PROGRAM

## 2021-02-10 ASSESSMENT — ENCOUNTER SYMPTOMS
EYE REDNESS: 0
DIARRHEA: 0
ALLERGIC/IMMUNOLOGIC NEGATIVE: 1
ABDOMINAL PAIN: 0
EYES NEGATIVE: 1
VOMITING: 0
NAUSEA: 0
RESPIRATORY NEGATIVE: 1
GASTROINTESTINAL NEGATIVE: 1

## 2021-02-10 ASSESSMENT — PATIENT HEALTH QUESTIONNAIRE - PHQ9
SUM OF ALL RESPONSES TO PHQ QUESTIONS 1-9: 0
2. FEELING DOWN, DEPRESSED OR HOPELESS: 0
SUM OF ALL RESPONSES TO PHQ QUESTIONS 1-9: 0

## 2021-02-10 NOTE — PROGRESS NOTES
[facility]  Family Medicine Residency Program  Phone: [phone]  Fax: [phone]    Patient:  Ben Jackson 73 y.o. female                                 Date of Service: 2/10/21                            Chiefcomplaint:   Chief Complaint   Patient presents with    Pre-op Exam     right foot-pin removal surgery         History of Present Illness: The patient is a 73 y.o. female  presented to the clinic : Preop evaluation  -She is patient with diabetes, ESRD, diastolic heart failure, hypertension, LVH who said she  is going to remove pins from right  foot under local anesthesia tomorrow  -She has brought a format to be signed by a primary care provider  -Her blood pressure is 119/54, pulse 75, sat 99%  -She is patient with ESRD and dialysis 3 times in a week   -Patient is on insulin A1c 6.2, she follows up with cardiologist Dr. Brenda Graves who saw in October 2020  -Cardiology noted :the patient clinically stable from a cardiovascular standpoint and is considered a low cardiac risk for the proposed colonoscopy. She is not a candidate for renal transplant. 10/13/2020; Dr. Brenda Graves  -Patient is not on any blood thinner and she did not have any anesthesia complication in the past  -Latest echo done in 2020 November; ejection fraction more than 60%, left ventricular hypertrophy  -Patient is not on any blood thinner and has had surgery in the past, denied any anesthesia complication  -Patient denied any chest pain, shortness of breath, fever with chills, nausea vomiting, palpitation, dizziness, diaphoresis    Review of Systems:   Review of Systems   Constitutional: Negative. Negative for chills and fever. HENT: Negative. Eyes: Negative. Negative for redness. Respiratory: Negative. Cardiovascular: Negative. Negative for chest pain, palpitations and leg swelling. Gastrointestinal: Negative. Negative for abdominal pain, diarrhea, nausea and vomiting. Endocrine: Negative. Genitourinary: Negative. Negative for hematuria. Musculoskeletal: Negative. Skin: Positive for wound. Allergic/Immunologic: Negative. Neurological: Negative. Negative for dizziness and numbness. Hematological: Negative. Psychiatric/Behavioral: Negative. Past Medical History:      Diagnosis Date    Acute infection of bone ([address]     infection of rt foot, resolved.     Anemia of chronic disease     Breast cancer ([address]     right breast, 2008/ bladder, 2006- last chemotherapy \"years ago\"    CAD (coronary artery disease)     Carpal tunnel syndrome     bilat - for OR left hand 3-17-20     Chronic diastolic CHF (congestive heart failure) ([address] 09/23/2014 9/23/14- echocardiogram revealed moderate LV concentric hypertrophy, stage III diastolic dysfunction, mild tricuspid regurgitation    CKD (chronic kidney disease) stage 4, GFR 15-29 ml/min ([facility])     Diabetic retinopathy ([address]     Glaucoma     Hemodialysis patient ([address]     [facility]- Dr. Marcie Moser - left arm fistula     Hyperkalemia, diminished renal excretion 11/9/2017    Hypertension     Hypoglycemia unawareness in type 1 diabetes mellitus ([address] 11/7/2017    Insulin dependent type 2 diabetes mellitus ([address]     Neuropathy     feet    Osteomyelitis due to secondary diabetes ([address]     rt great toe with amputation    Patient is [employer] 11/7/2017    Refusal of blood product     patient states she dose not take blood transfusion    Ventricular hypertrophy     Vitreous hemorrhage ([address]     left eye       Past Surgical History:        Procedure Laterality Date    AMPUTATION      right great toe    ANKLE SURGERY      correction on charcot joint of right ankle    CARPAL TUNNEL RELEASE Left 3/17/2020    LEFT CARPAL TUNNEL RELEASE performed by Dillan Mckenzie MD at [facility]      bilateral    CHOLECYSTECTOMY      COLONOSCOPY      CYSTOSCOPY      DIALYSIS FISTULA CREATION Left 01/31/2018 upper arm/Dr. Radford Redhead    ECHO COMPL W DOP COLOR FLOW  2/14/2013         ECHO COMPLETE  9/17/2013         MASTECTOMY      right    OTHER SURGICAL HISTORY  9/27/2011    PPV, membranectomy, laser Right eye    OTHER SURGICAL HISTORY  insertion lumbar drain insertion    10/12/`14    OTHER SURGICAL HISTORY  10/22/15    percutaneous lead placement for spinal cord stimulator    OTHER SURGICAL HISTORY  11/03/2015    Spinal; cord stimulator- turned off as of 3-10-20     MI AV ANAST,UP ARM BASILIC VEIN TRANSPOSIT Left 5/15/2018    TRANSPOSITION STAGE II AV FISTULA - LEFT UPPER ARM performed by Dawood Muhammad MD at [facility] ANGIOACCESS AV FISTULA Left 9/25/2018    SUPERFICIALIZATION AV FISTULA - LEFT ARM performed by Dawood Mhuammad MD at [address] [facility] W/VITRECTOMY ANY METH Left 4/10/2018    PARS PLANA VITRECTOMY 25 GAUGE RETINAL DETACHMENT REPAIR air fluid exchange, endolaser performed by Disha Yanes MD at [address] [place] [address] Drive TUNNELED VENOUS CATHETER PLACEMENT  11/15/2017    VITRECTOMY Left 04/10/2018    PARS PLANA VITRECTOMY; RETINAL DETACHMENT REPAIR; GAS BUBBLE; LASER LEFT EYE       Allergies:    Lasix [furosemide]    Social History:   Social History     Socioeconomic History    Marital status: Single     Spouse name: Not on file    Number of children: Not on file    Years of education: Not on file    Highest education level: Not on file   Occupational History    Not on file   Social Needs    Financial resource strain: Not on file    Food insecurity     Worry: Not on file     Inability: Not on file    Transportation needs     Medical: Not on file     Non-medical: Not on file   Tobacco Use    Smoking status: Never Smoker    Smokeless tobacco: Never Used   Substance and Sexual Activity    Alcohol use: No    Drug use: No    Sexual activity: Not Currently   Lifestyle    Physical activity     Days per week: Not on file     Minutes per session: Not on file    Stress: Not on file   Relationships    Social connections     Talks on phone: Not on file     Gets together: Not on file     Attends Hinduism service: Not on file     Active member of club or organization: Not on file     Attends meetings of clubs or organizations: Not on file     Relationship status: Not on file    Intimate partner violence     Fear of current or ex partner: Not on file     Emotionally abused: Not on file     Physically abused: Not on file     Forced sexual activity: Not on file   Other Topics Concern    Not on file   Social History Narrative    Not on file        Family History:       Problem Relation Age of Onset    Breast Cancer Mother 61    Hypertension Mother     Heart Disease Father     Prostate Cancer Father     Breast Cancer Maternal Grandmother 60       BP Readings from Last 3 Encounters:   02/10/21 (!) 119/54   01/08/21 (!) 145/73   10/13/20 110/64       Physical Exam:    Vitals: BP (!) 119/54   Pulse 75   Temp 97.8 °F (36.6 °C) (Temporal)   Resp 16   Ht 5' 10\" (1.778 m)   Wt 188 lb (85.3 kg)   SpO2 99%   BMI 26.98 kg/m²   General Appearance: Well developed, awake, alert, oriented, no acute distress  HEENT: Normocephalic,atraumatic. PERRL, EOM's intact, EAC without erythemaor swelling, no pallor or icterus. Neck: Supple, symmetrical, trachea midline. No JVD. Chest wall/Lung: Clear to auscultation bilaterally,  respirations unlabored. No ronchi/wheezing/rales  Heart[de-identified]  Regular rate and rhythm, S1and S2 normal, no murmur, rub or gallop. Abdomen: Soft, non-tender, bowel sounds normoactive, no masses, no organomegaly  Extremities:  Extremities normal, atraumatic, no cyanosis. no edema. Skin: Skin color, texture, turgor normal, no rashes or lesions  Musculokeletal: ROM grossly normal in all joints of extremities, no obvious joint swelling. Lymph nodes: no lymph node enlargement appreciated  Neurologic:   Alert&Oriented.     Normal gait and coordination  No focal neurological deficits appreaciated         Psychiatric: has a normal mood and affect. Behavior is normal.       Assessment and Plan:         Rebecca Velazquez was seen today for pre-op exam.    Diagnoses and all orders for this visit:    Pre-op exam  -Scheduled for surgery under local anesthesia tomorrow: Right foot-pin removal  -Has had surgery in the past, no anesthesia complication  -Not on any blood thinner   -Recently visited cardiologist; who documented low cardiac risks  -It is ambulatory surgery under local anesthesia; comes under low risk group  -We suggested to cut half the dose of insulin the day before surgery as she will be n.p.o.  -She can proceed to surgery    Pre-Operative Risk assessment using 2014 ACC/AHA guidelines     Emergent procedure No  Active Cardiac Condition No (decompensated HF, Arrhythmia, MI <3 weeks, severe valve disease)  Risk Level of Procedure Low Risk (endoscopy, superficial skin, breast, ambulatory, or cataract, etc.)  Revised Cardiac Risk Index Risk factors: None  Measurement of Exercise Tolerance before Surgery >4 Yes    According to the 2014 ACC/AHA pre-operative risk assessment guidelines Claudia Kinney is a low risk for major cardiac complications during a low risk procedure and may continue as planned. Specific medication recommendations are listed below. Medications recommended to continue should be taken with a sip of water even when NPO. Further recommendations from consultants:  Take regular medication      Medication Recommendations:  -Half the dose of insulin before the day of surgery    Medical Decision Making  Number and Complexity of Problems Addressed:   Stable Chronic Illnesses: Diabetic foot, preop exam and Acute Uncomplicated Illnesses or Injuries: pre op exam  Level of Problems Addressed:  Low (2+ self-limited or minor problems / 1 stable chronic illness / 1 acute, uncomplicated illness or injury)  Amount and/or Complexity of Data to be Reviewed and Analyzed:    Limited (1 of 2 Categories) Category 1 (Any two of the following, can duplicate): External Note from Unique Source, Review Results of Unique Test, Order Unique Test / Category 2 (Assessment Requiring Independent Historian(s))  Risk of Complications and/or Morbidity or Mortality of Patient Management:   Low risk of morbidity from additional diagnostic testing or treatment  Today's visit has a medical decision making level of Low. I encourage further reading and education about your health conditions. Information on many healthconditions is provided by the American Academy of Family Physicians: [email or web address] org/  Please bring any questions to me at your next visit. Return to Office: No follow-ups on file.     Medication List:    Current Outpatient Medications   Medication Sig Dispense Refill    atorvastatin (LIPITOR) 40 MG tablet TAKE ONE TABLET BY MOUTH EVERY DAY 60 tablet 0    triamcinolone (KENALOG) 0.1 % cream APPLY 2 TIMES A DAY  NEVER TO FACE      isosorbide mononitrate (IMDUR) 30 MG extended release tablet TAKE ONE TABLET BY MOUTH DAILY 60 tablet 0    bumetanide (BUMEX) 2 MG tablet Take 1 tablet by mouth three times a week Sund, Tues, Thurs, 60 tablet 1    allopurinol (ZYLOPRIM) 100 MG tablet Take 1 tablet by mouth daily 90 tablet 0    insulin glargine (LANTUS SOLOSTAR) 100 UNIT/ML injection pen inject 11 units into the skin nightly 15 mL 3    omeprazole (PRILOSEC) 20 MG delayed release capsule TAKE ONE CAPSULE BY MOUTH EVERY DAY AT BEDTIME 30 capsule 0    B Complex-C-Folic Acid (RENAL-FADI) 0.8 MG TABS Take 0.8 tablets by mouth      ASPIRIN LOW DOSE 81 MG EC tablet TAKE ONE TABLET BY MOUTH DAILY 30 tablet 0    Insulin Pen Needle (PEN NEEDLES) 31G X 6 MM MISC 1 each by Does not apply route daily 100 each 5    LUMIGAN 0.01 % SOLN ophthalmic drops instill 1 drop every evening into right eye      cyanocobalamin 1000 MCG/ML injection Inject 1,000 mcg into the muscle once Once a month at dialysis      VELPHORO 500 MG CHEW CRUSH OR CHEW AND SWALLOW 2 TABLETS 3 TIMES A DAY WITH MEALS      lidocaine-prilocaine (EMLA) 2.5-2.5 % cream APPLY SMALL AMOUNT TO ACCESS SITE (AVF) 1 HOUR BEFORE DIALYSIS. COVER WITH OCCLUSIVE DRESSING (SARAN WRAP)  6    COMBIGAN 0.2-0.5 % ophthalmic solution INSTILL ONE DROP INTO THE RIGHT EYE TWICE A DAY  7    camphor-menthol (SARNA) 0.5-0.5 % lotion Apply topically as needed. 1 Bottle 4    insulin lispro (HUMALOG KWIKPEN) 100 UNIT/ML pen Take 2 units > 140 , take 3 units > 170, take 4 units > 200 units (Patient taking differently: Sliding scale as needed with meals) 5 pen 5    blood glucose test strips (ACCU-CHEK ACTIVE STRIPS) strip 1 each by In Vitro route 2 times daily As needed. 100 each 5    sevelamer (RENVELA) 800 MG tablet Take 1 tablet by mouth 3 times daily (with meals)      darbepoetin fani-polysorbate (ARANESP) 40 MCG/0.4ML SOLN injection Inject 0.4 mLs into the skin once a week. (Patient taking differently: Inject 40 mcg into the skin every 14 days ) 8.4 mL 0    gabapentin (NEURONTIN) 100 MG capsule Take 2 capsules by mouth 3 times daily for 30 days. (Patient taking differently: Take 200 mg by mouth as needed. ) 90 capsule 1     No current facility-administered medications for this visit. Rocky Wadsworth MD       This document may have been prepared at least partiallythrough the use of voice recognition software. Although effort is taken to assure the accuracy of this document, it is possible that grammatical, syntax,  or spelling errors may occur.

## 2021-02-10 NOTE — PROGRESS NOTES
[facility]  Precepting Note    Subjective:  74 yo F here for preoperative clearance  She has h/o ESRD on HD, Diabetes, HTN, CHF  Surgery is going to be under local anesthesia  She is   Had clearance from Dr Fátima Salgado prior to colonoscopy  Lab Results   Component Value Date    LABA1C 6.2 08/06/2020    LABA1C 7.4 03/03/2020    LABA1C 6.9 11/25/2019     Last ECHo was done a few years ago and showed a preserved ejection fraction. ROS otherwise negative     Past medical, surgical, family and social history were reviewed, non-contributory, and unchanged unless otherwise stated. Objective:    BP (!) 119/54   Pulse 75   Temp 97.8 °F (36.6 °C) (Temporal)   Resp 16   Ht 5' 10\" (1.778 m)   Wt 188 lb (85.3 kg)   SpO2 99%   BMI 26.98 kg/m²     Exam is as noted by resident     Assessment/Plan:  74 yo F for preoperative clearance for low risk procedure under local aneshtesia, acceptable low risk to proceed  Diabetes - reduce long acting insulin to no more than 5 units and check sugars prior to procedure     Attending Physician Statement  I have reviewed the chart, including any radiology or labs. I have discussed the case, including pertinent history and exam findings with the resident. I agree with the assessment, plan and orders as documented by the resident. Please refer to the resident note for additional information.       Electronically signed by Simona Jaquez MD on 2/10/2021 at 4:15 PM

## 2021-02-25 ENCOUNTER — OFFICE VISIT (OUTPATIENT)
Dept: FAMILY MEDICINE CLINIC | Age: 66
End: 2021-02-25
Payer: COMMERCIAL

## 2021-02-25 VITALS
OXYGEN SATURATION: 97 % | BODY MASS INDEX: 27.06 KG/M2 | TEMPERATURE: 97.6 F | DIASTOLIC BLOOD PRESSURE: 50 MMHG | SYSTOLIC BLOOD PRESSURE: 110 MMHG | HEIGHT: 70 IN | HEART RATE: 74 BPM | RESPIRATION RATE: 16 BRPM | WEIGHT: 189 LBS

## 2021-02-25 DIAGNOSIS — E11.29 TYPE 2 DIABETES MELLITUS WITH OTHER DIABETIC KIDNEY COMPLICATION, WITH LONG-TERM CURRENT USE OF INSULIN (HCC): Primary | ICD-10-CM

## 2021-02-25 DIAGNOSIS — M19.011 ARTHRITIS OF RIGHT SHOULDER REGION: ICD-10-CM

## 2021-02-25 DIAGNOSIS — M65.319 TRIGGER FINGER OF THUMB, UNSPECIFIED LATERALITY: ICD-10-CM

## 2021-02-25 DIAGNOSIS — Z79.4 TYPE 2 DIABETES MELLITUS WITH OTHER DIABETIC KIDNEY COMPLICATION, WITH LONG-TERM CURRENT USE OF INSULIN (HCC): Primary | ICD-10-CM

## 2021-02-25 LAB — HBA1C MFR BLD: 6.7 %

## 2021-02-25 PROCEDURE — G8417 CALC BMI ABV UP PARAM F/U: HCPCS | Performed by: STUDENT IN AN ORGANIZED HEALTH CARE EDUCATION/TRAINING PROGRAM

## 2021-02-25 PROCEDURE — 83036 HEMOGLOBIN GLYCOSYLATED A1C: CPT | Performed by: STUDENT IN AN ORGANIZED HEALTH CARE EDUCATION/TRAINING PROGRAM

## 2021-02-25 PROCEDURE — 4040F PNEUMOC VAC/ADMIN/RCVD: CPT | Performed by: STUDENT IN AN ORGANIZED HEALTH CARE EDUCATION/TRAINING PROGRAM

## 2021-02-25 PROCEDURE — 1090F PRES/ABSN URINE INCON ASSESS: CPT | Performed by: STUDENT IN AN ORGANIZED HEALTH CARE EDUCATION/TRAINING PROGRAM

## 2021-02-25 PROCEDURE — G8427 DOCREV CUR MEDS BY ELIG CLIN: HCPCS | Performed by: STUDENT IN AN ORGANIZED HEALTH CARE EDUCATION/TRAINING PROGRAM

## 2021-02-25 PROCEDURE — G8400 PT W/DXA NO RESULTS DOC: HCPCS | Performed by: STUDENT IN AN ORGANIZED HEALTH CARE EDUCATION/TRAINING PROGRAM

## 2021-02-25 PROCEDURE — G8484 FLU IMMUNIZE NO ADMIN: HCPCS | Performed by: STUDENT IN AN ORGANIZED HEALTH CARE EDUCATION/TRAINING PROGRAM

## 2021-02-25 PROCEDURE — 3017F COLORECTAL CA SCREEN DOC REV: CPT | Performed by: STUDENT IN AN ORGANIZED HEALTH CARE EDUCATION/TRAINING PROGRAM

## 2021-02-25 PROCEDURE — 2022F DILAT RTA XM EVC RTNOPTHY: CPT | Performed by: STUDENT IN AN ORGANIZED HEALTH CARE EDUCATION/TRAINING PROGRAM

## 2021-02-25 PROCEDURE — 3044F HG A1C LEVEL LT 7.0%: CPT | Performed by: STUDENT IN AN ORGANIZED HEALTH CARE EDUCATION/TRAINING PROGRAM

## 2021-02-25 PROCEDURE — 1036F TOBACCO NON-USER: CPT | Performed by: STUDENT IN AN ORGANIZED HEALTH CARE EDUCATION/TRAINING PROGRAM

## 2021-02-25 PROCEDURE — 1123F ACP DISCUSS/DSCN MKR DOCD: CPT | Performed by: STUDENT IN AN ORGANIZED HEALTH CARE EDUCATION/TRAINING PROGRAM

## 2021-02-25 PROCEDURE — 99213 OFFICE O/P EST LOW 20 MIN: CPT | Performed by: STUDENT IN AN ORGANIZED HEALTH CARE EDUCATION/TRAINING PROGRAM

## 2021-02-25 RX ORDER — INSULIN GLARGINE 100 [IU]/ML
INJECTION, SOLUTION SUBCUTANEOUS
Qty: 15 ML | Refills: 3
Start: 2021-02-25 | End: 2021-05-19

## 2021-02-25 ASSESSMENT — ENCOUNTER SYMPTOMS
SHORTNESS OF BREATH: 0
VOMITING: 0
BACK PAIN: 1
NAUSEA: 0
ABDOMINAL PAIN: 0
RHINORRHEA: 0
COUGH: 0

## 2021-02-25 NOTE — PROGRESS NOTES
[facility]  Department of Family Medicine  Family Medicine Residency Program      Patient:  Ignacia Cullen 73 y.o. female     Date of Service: 2/25/21      Chief complaint:   Chief Complaint   Patient presents with    Hypertension    Diabetes         History ofPresent Illness   The patient is a 73 y.o. female  here to follow up of their hypertension, diabetes, ckd, multiple joint problems  Surgery on R foot recently  Getting stitches out today    Seeing kidney doctor at [facility]  ESRD on dialysis  She still gets burning after dialysis-gabapentin helps some  Gets dialysis in [facility]  Dialysis MWF  She has an appointment with Dr. Ivanna Chaves March 1    DM  Lab Results   Component Value Date    LABA1C 6.7 02/25/2021     No results found for: EAG  She is doing well with her DM  On insulin, 10 Lantus nightly  She is OK with decreasing the dose given being on dialysis and well controlled A1c  Also on lispro sliding scale    Multiple joint pains  History of back surgery  Makes it hard to walk, lift up her feet very far  She needs to use a walker to walk  Dr. Mark Hernández told her she needs shoulder replacement-her R shoulder is the most painful joint. Also has lymphedema in the R arm-hx of breast cancer. She also has a history of carpal tunnel release and was told she may eventually need trigger fingers relseased    Past Medical History:      Diagnosis Date    Acute infection of bone (HCC)     infection of rt foot, resolved.     Anemia of chronic disease     Breast cancer ([address]     right breast, 2008/ bladder, 2006- last chemotherapy \"years ago\"    CAD (coronary artery disease)     Carpal tunnel syndrome     bilat - for OR left hand 3-17-20     Chronic diastolic CHF (congestive heart failure) ([address] 09/23/2014 9/23/14- echocardiogram revealed moderate LV concentric hypertrophy, stage III diastolic dysfunction, mild tricuspid regurgitation    CKD (chronic kidney disease) stage 4, GFR 15-29 ml/min ([address]     Diabetic retinopathy ([address]     Glaucoma     Hemodialysis patient ([address]     [facility] wed fri- Dr. Agustin Aldana - left arm fistula     Hyperkalemia, diminished renal excretion 11/9/2017    Hypertension     Hypoglycemia unawareness in type 1 diabetes mellitus ([address] 11/7/2017    Insulin dependent type 2 diabetes mellitus ([address]     Neuropathy     feet    Osteomyelitis due to secondary diabetes ([address]     rt great toe with amputation    Patient is Synagogue 11/7/2017    Refusal of blood product     patient states she dose not take blood transfusion    Ventricular hypertrophy     Vitreous hemorrhage ([address]     left eye       Review of Systems:   Review of Systems   Constitutional: Negative for chills and fever. HENT: Negative for congestion and rhinorrhea. Respiratory: Negative for cough and shortness of breath. Cardiovascular: Negative for chest pain and leg swelling. Gastrointestinal: Negative for abdominal pain, nausea and vomiting. Genitourinary: Negative for dysuria and hematuria. Musculoskeletal: Positive for arthralgias, back pain and gait problem. Negative for myalgias. Skin: Negative for rash and wound. Neurological: Negative for dizziness and light-headedness. Physical Exam   Vitals: BP (!) 110/50   Pulse 74   Temp 97.6 °F (36.4 °C) (Temporal)   Resp 16   Ht 5' 10\" (1.778 m)   Wt 189 lb (85.7 kg)   SpO2 97%   BMI 27.12 kg/m²   Physical Exam    General:  Well developed, well nourished, well groomed. No apparent distress. HEENT:  Normocephalic, atraumatic. No scleral icterus. No conjunctival injection. No nasal discharge. Heart:  RRR, no murmurs, gallops, or rubs  Lungs:  CTA bilaterally, bilat symmetrical expansion, no wheeze, rales, or rhonchi  Abdomen: Bowel sounds present, soft, nontender, no masses, no organomegaly, no peritoneal signs  Extremities:  No clubbing, cyanosis, or edema. Bilateral trigger fingers noted in the thumb.  R arm edema  Neuro:  Alert and oriented x3, no focal deficits      Assessment and Plan       1. Type 2 diabetes mellitus with other diabetic kidney complication, with long-term current use of insulin ([address]  - Continue following with nephrology  -BP and glucose controlled, can decrease lantus dose slightly  - insulin glargine (LANTUS SOLOSTAR) 100 UNIT/ML injection pen; inject 8 units into the skin nightly  Dispense: 15 mL; Refill: 3    2. Arthritis of right shoulder region  - Trial of voltaren. Needs to continue following with ortho  - diclofenac sodium (VOLTAREN) 1 % GEL; Apply 2 g topically 2 times daily  Dispense: 100 g; Refill: 0    3. Trigger finger of thumb, unspecified laterality  - Has followed with Dr. Kirby Bella in the past for carpal tunnel. She agreed to call his office      Counseled regarding above diagnosis, including possible risks and complications,  especially if left uncontrolled. Counseled regarding the possible side effects, risks, benefits and alternatives to treatment;patient and/or guardian verbalizes understanding, agrees, feels comfortable with and wishes to proceed with above treatment plan. Call or go to ED immediately if symptoms worsen or persist. Advised patient to call with any new medication issues, and, as applicable, read all Rx info from pharmacy to assure aware of all possible risks and side effects of medicationbefore taking. Patient and/or guardian given opportunity to ask questions/raise concerns. The patient verbalized comfort and understanding ofinstructions. I encourage further reading and education about your health conditions. Information on many health conditions is provided by [facility] [employer] of Family Physicians: [email or web address]  Please bring any questions to me at your nextvisit. Return to Office: No follow-ups on file.     Medication List:    Current Outpatient Medications   Medication Sig Dispense Refill    diclofenac sodium (VOLTAREN) 1 % GEL Apply 2 g topically 2 times daily 100 g 0    insulin glargine (LANTUS SOLOSTAR) 100 UNIT/ML injection pen inject 8 units into the skin nightly 15 mL 3    atorvastatin (LIPITOR) 40 MG tablet TAKE ONE TABLET BY MOUTH EVERY DAY 60 tablet 0    triamcinolone (KENALOG) 0.1 % cream APPLY 2 TIMES A DAY  NEVER TO FACE      isosorbide mononitrate (IMDUR) 30 MG extended release tablet TAKE ONE TABLET BY MOUTH DAILY 60 tablet 0    bumetanide (BUMEX) 2 MG tablet Take 1 tablet by mouth three times a week Sund, Tues, Thurs, 60 tablet 1    allopurinol (ZYLOPRIM) 100 MG tablet Take 1 tablet by mouth daily 90 tablet 0    omeprazole (PRILOSEC) 20 MG delayed release capsule TAKE ONE CAPSULE BY MOUTH EVERY DAY AT BEDTIME 30 capsule 0    B Complex-C-Folic Acid (RENAL-FADI) 0.8 MG TABS Take 0.8 tablets by mouth      Insulin Pen Needle (PEN NEEDLES) 31G X 6 MM MISC 1 each by Does not apply route daily 100 each 5    LUMIGAN 0.01 % SOLN ophthalmic drops instill 1 drop every evening into right eye      cyanocobalamin 1000 MCG/ML injection Inject 1,000 mcg into the muscle once Once a month at dialysis      VELPHORO 500 MG CHEW CRUSH OR CHEW AND SWALLOW 2 TABLETS 3 TIMES A DAY WITH MEALS      gabapentin (NEURONTIN) 100 MG capsule Take 2 capsules by mouth 3 times daily for 30 days. (Patient taking differently: Take 200 mg by mouth as needed. ) 90 capsule 1    lidocaine-prilocaine (EMLA) 2.5-2.5 % cream APPLY SMALL AMOUNT TO ACCESS SITE (AVF) 1 HOUR BEFORE DIALYSIS. COVER WITH OCCLUSIVE DRESSING (SARAN WRAP)  6    COMBIGAN 0.2-0.5 % ophthalmic solution INSTILL ONE DROP INTO THE RIGHT EYE TWICE A DAY  7    camphor-menthol (SARNA) 0.5-0.5 % lotion Apply topically as needed.  1 Bottle 4    insulin lispro (HUMALOG KWIKPEN) 100 UNIT/ML pen Take 2 units > 140 , take 3 units > 170, take 4 units > 200 units (Patient taking differently: Sliding scale as needed with meals) 5 pen 5    blood glucose test strips (ACCU-CHEK ACTIVE STRIPS) strip 1 each by In Vitro route 2 times daily As needed. 100 each 5    sevelamer (RENVELA) 800 MG tablet Take 1 tablet by mouth 3 times daily (with meals)      darbepoetin fani-polysorbate (ARANESP) 40 MCG/0.4ML SOLN injection Inject 0.4 mLs into the skin once a week. (Patient taking differently: Inject 40 mcg into the skin every 14 days ) 8.4 mL 0     No current facility-administered medications for this visit. Mateo Cardenas MD     This document may have been prepared at least partially through the use of voice recognition software. Although effort is taken to assure the accuracy ofthis document, it is possible that grammatical, syntax,  or spelling errors may occur.

## 2021-02-25 NOTE — PROGRESS NOTES
Subjective:    Claudia is here for a six month follow up. She is here for hand pain. She has a severe trigger finger of the left thumb. She has had CTS on the left. She does see Dr. Kartik Prince for this. She has end stage renal disease and is on dialysis. She has a chronically swollen right arm. She has a very complex history. ROS: Otherwise negative    Patient Active Problem List   Diagnosis    Diabetic retinopathy ([address]    Malignant neoplasm of right female breast ([address]    Atherosclerosis of native coronary artery of native heart without angina pectoris    Moderate obesity    Left ventricular hypertrophy    Herniated lumbar intervertebral disc    Lumbar degenerative disc disease    Pseudomeningocele    Lumbar radiculopathy    Lymphedema of arm    CKD (chronic kidney disease) stage 4, GFR 15-29 ml/min ([facility])    Insulin dependent type 2 diabetes mellitus (HCC)    Anemia of chronic disease    Chronic diastolic CHF (congestive heart failure) ([facility])    Neuropathy    Hypertension    Glaucoma    Refusal of blood product    Pancytopenia ([facility])    Controlled type 2 diabetes mellitus with chronic kidney disease on chronic dialysis, with long-term current use of insulin ([facility])    Vitreous hemorrhage ([address]    Patient is Mosque    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 ([address]    Hyperkalemia, diminished renal excretion    Chronic pain syndrome    Lumbar post-laminectomy syndrome    Myalgia    Cervicalgia    Diabetic peripheral neuropathy (HCC)    ESRD (end stage renal disease) ([address]    Bilateral carpal tunnel syndrome    Spinal stenosis of lumbar region with neurogenic claudication       Past medical, surgical, family and social history were reviewed, non-contributory, and unchanged unless otherwise stated.     Objective:    BP (!) 110/50   Pulse 74   Temp 97.6 °F (36.4 °C) (Temporal)   Resp 16   Ht 5' 10\" (1.778 m)   Wt 189 lb (85.7 kg)   SpO2 97% BMI 27.12 kg/m²     Exam is as noted by resident with the following changes, additions or corrections:      Assessment/Plan:        Magalie Castle was seen today for hypertension and diabetes. Diagnoses and all orders for this visit:    Type 2 diabetes mellitus with stage 4 chronic kidney disease, unspecified whether long term insulin use (HCC)  -     POCT glycosylated hemoglobin (Hb A1C)           Attending Physician Statement    I have reviewed the chart, including any radiology or labs. I have discussed the case, including pertinent history and exam findings with the resident. I agree with the assessment, plan and orders as documented by the resident. Please refer to the resident note for additional information.       Electronically signed by Temo Reyes DO on 2/25/2021 at 11:39 AM

## 2021-03-05 DIAGNOSIS — Z76.0 MEDICATION REFILL: ICD-10-CM

## 2021-03-05 RX ORDER — ISOSORBIDE MONONITRATE 30 MG/1
TABLET, EXTENDED RELEASE ORAL
Qty: 60 TABLET | Refills: 1 | Status: SHIPPED
Start: 2021-03-05 | End: 2021-05-19

## 2021-03-05 RX ORDER — ALLOPURINOL 100 MG/1
100 TABLET ORAL DAILY
Qty: 90 TABLET | Refills: 1 | Status: SHIPPED
Start: 2021-03-05 | End: 2021-09-07 | Stop reason: SDUPTHER

## 2021-03-08 DIAGNOSIS — E11.22 TYPE 2 DIABETES MELLITUS WITH STAGE 4 CHRONIC KIDNEY DISEASE, UNSPECIFIED WHETHER LONG TERM INSULIN USE (HCC): ICD-10-CM

## 2021-03-08 DIAGNOSIS — I25.10 ATHEROSCLEROSIS OF NATIVE CORONARY ARTERY OF NATIVE HEART WITHOUT ANGINA PECTORIS: ICD-10-CM

## 2021-03-08 DIAGNOSIS — N18.4 TYPE 2 DIABETES MELLITUS WITH STAGE 4 CHRONIC KIDNEY DISEASE, UNSPECIFIED WHETHER LONG TERM INSULIN USE (HCC): ICD-10-CM

## 2021-03-08 RX ORDER — ATORVASTATIN CALCIUM 40 MG/1
40 TABLET, FILM COATED ORAL DAILY
Qty: 90 TABLET | Refills: 1 | Status: SHIPPED
Start: 2021-03-08 | End: 2021-09-07 | Stop reason: SDUPTHER

## 2021-05-19 ENCOUNTER — APPOINTMENT (OUTPATIENT)
Dept: CT IMAGING | Age: 66
DRG: 246 | End: 2021-05-19
Payer: COMMERCIAL

## 2021-05-19 ENCOUNTER — APPOINTMENT (OUTPATIENT)
Dept: GENERAL RADIOLOGY | Age: 66
DRG: 246 | End: 2021-05-19
Payer: COMMERCIAL

## 2021-05-19 ENCOUNTER — HOSPITAL ENCOUNTER (INPATIENT)
Age: 66
LOS: 6 days | Discharge: HOME | DRG: 246 | End: 2021-05-25
Attending: EMERGENCY MEDICINE | Admitting: INTERNAL MEDICINE
Payer: COMMERCIAL

## 2021-05-19 DIAGNOSIS — I47.20 VENTRICULAR TACHYCARDIA: ICD-10-CM

## 2021-05-19 DIAGNOSIS — N18.6 ESRD (END STAGE RENAL DISEASE) (HCC): ICD-10-CM

## 2021-05-19 DIAGNOSIS — I25.119 CORONARY ARTERY DISEASE INVOLVING NATIVE CORONARY ARTERY OF NATIVE HEART WITH ANGINA PECTORIS (HCC): ICD-10-CM

## 2021-05-19 DIAGNOSIS — I46.9 CARDIAC ARREST (HCC): Primary | ICD-10-CM

## 2021-05-19 DIAGNOSIS — N18.6 STAGE 5 CHRONIC KIDNEY DISEASE ON DIALYSIS (HCC): ICD-10-CM

## 2021-05-19 DIAGNOSIS — Z99.2 STAGE 5 CHRONIC KIDNEY DISEASE ON DIALYSIS (HCC): ICD-10-CM

## 2021-05-19 DIAGNOSIS — I21.4 NSTEMI (NON-ST ELEVATED MYOCARDIAL INFARCTION) (HCC): ICD-10-CM

## 2021-05-19 LAB
ADENOVIRUS BY PCR: NOT DETECTED
ALBUMIN SERPL-MCNC: 4 G/DL (ref 3.5–5.2)
ALBUMIN SERPL-MCNC: 4.2 G/DL (ref 3.5–5.2)
ALP BLD-CCNC: 133 U/L (ref 35–104)
ALP BLD-CCNC: 136 U/L (ref 35–104)
ALT SERPL-CCNC: 116 U/L (ref 0–32)
ALT SERPL-CCNC: 98 U/L (ref 0–32)
ANION GAP SERPL CALCULATED.3IONS-SCNC: 15 MMOL/L (ref 7–16)
ANION GAP SERPL CALCULATED.3IONS-SCNC: 17 MMOL/L (ref 7–16)
APTT: 36.5 SEC (ref 24.5–35.1)
AST SERPL-CCNC: 102 U/L (ref 0–31)
AST SERPL-CCNC: 113 U/L (ref 0–31)
B.E.: 0.2 MMOL/L (ref -3–3)
BASOPHILS ABSOLUTE: 0.03 E9/L (ref 0–0.2)
BASOPHILS ABSOLUTE: 0.03 E9/L (ref 0–0.2)
BASOPHILS RELATIVE PERCENT: 0.5 % (ref 0–2)
BASOPHILS RELATIVE PERCENT: 0.7 % (ref 0–2)
BILIRUB SERPL-MCNC: 0.5 MG/DL (ref 0–1.2)
BILIRUB SERPL-MCNC: 0.6 MG/DL (ref 0–1.2)
BILIRUBIN DIRECT: <0.2 MG/DL (ref 0–0.3)
BILIRUBIN, INDIRECT: ABNORMAL MG/DL (ref 0–1)
BORDETELLA PARAPERTUSSIS BY PCR: NOT DETECTED
BORDETELLA PERTUSSIS BY PCR: NOT DETECTED
BUN BLDV-MCNC: 21 MG/DL (ref 6–23)
BUN BLDV-MCNC: 24 MG/DL (ref 6–23)
CALCIUM SERPL-MCNC: 8.8 MG/DL (ref 8.6–10.2)
CALCIUM SERPL-MCNC: 8.9 MG/DL (ref 8.6–10.2)
CHLAMYDOPHILIA PNEUMONIAE BY PCR: NOT DETECTED
CHLORIDE BLD-SCNC: 95 MMOL/L (ref 98–107)
CHLORIDE BLD-SCNC: 96 MMOL/L (ref 98–107)
CHOLESTEROL, TOTAL: 101 MG/DL (ref 0–199)
CK MB: 2.7 NG/ML (ref 0–4.3)
CK MB: 4.3 NG/ML (ref 0–4.3)
CO2: 27 MMOL/L (ref 22–29)
CO2: 28 MMOL/L (ref 22–29)
COHB: 1.2 % (ref 0–1.5)
CORONAVIRUS 229E BY PCR: NOT DETECTED
CORONAVIRUS HKU1 BY PCR: NOT DETECTED
CORONAVIRUS NL63 BY PCR: NOT DETECTED
CORONAVIRUS OC43 BY PCR: NOT DETECTED
CREAT SERPL-MCNC: 3.9 MG/DL (ref 0.5–1)
CREAT SERPL-MCNC: 4.6 MG/DL (ref 0.5–1)
CRITICAL: ABNORMAL
DATE ANALYZED: ABNORMAL
DATE OF COLLECTION: ABNORMAL
EKG ATRIAL RATE: 66 BPM
EKG P AXIS: 49 DEGREES
EKG P-R INTERVAL: 194 MS
EKG Q-T INTERVAL: 434 MS
EKG QRS DURATION: 112 MS
EKG QTC CALCULATION (BAZETT): 454 MS
EKG R AXIS: 91 DEGREES
EKG T AXIS: 103 DEGREES
EKG VENTRICULAR RATE: 66 BPM
EOSINOPHILS ABSOLUTE: 0.03 E9/L (ref 0.05–0.5)
EOSINOPHILS ABSOLUTE: 0.12 E9/L (ref 0.05–0.5)
EOSINOPHILS RELATIVE PERCENT: 0.5 % (ref 0–6)
EOSINOPHILS RELATIVE PERCENT: 2.8 % (ref 0–6)
GFR AFRICAN AMERICAN: 12
GFR AFRICAN AMERICAN: 14
GFR AFRICAN AMERICAN: 14
GFR NON-AFRICAN AMERICAN: 11 ML/MIN/1.73
GFR NON-AFRICAN AMERICAN: 12 ML/MIN/1.73
GFR NON-AFRICAN AMERICAN: 14 ML/MIN/1.73
GLUCOSE BLD-MCNC: 166 MG/DL (ref 74–99)
GLUCOSE BLD-MCNC: 171 MG/DL (ref 74–99)
GLUCOSE BLD-MCNC: 182 MG/DL (ref 74–99)
HBA1C MFR BLD: 6.6 % (ref 4–5.6)
HCO3: 24.7 MMOL/L (ref 22–26)
HCT VFR BLD CALC: 32.3 % (ref 34–48)
HCT VFR BLD CALC: 34.2 % (ref 34–48)
HDLC SERPL-MCNC: 38 MG/DL
HEMOGLOBIN: 10.2 G/DL (ref 11.5–15.5)
HEMOGLOBIN: 10.2 G/DL (ref 11.5–15.5)
HHB: 7.9 % (ref 0–5)
HUMAN METAPNEUMOVIRUS BY PCR: NOT DETECTED
HUMAN RHINOVIRUS/ENTEROVIRUS BY PCR: NOT DETECTED
IMMATURE GRANULOCYTES #: 0.02 E9/L
IMMATURE GRANULOCYTES #: 0.14 E9/L
IMMATURE GRANULOCYTES %: 0.4 % (ref 0–5)
IMMATURE GRANULOCYTES %: 3.2 % (ref 0–5)
INFLUENZA A BY PCR: NOT DETECTED
INFLUENZA B BY PCR: NOT DETECTED
INR BLD: 1.1
LAB: ABNORMAL
LDL CHOLESTEROL CALCULATED: 42 MG/DL (ref 0–99)
LYMPHOCYTES ABSOLUTE: 0.99 E9/L (ref 1.5–4)
LYMPHOCYTES ABSOLUTE: 1.47 E9/L (ref 1.5–4)
LYMPHOCYTES RELATIVE PERCENT: 17.6 % (ref 20–42)
LYMPHOCYTES RELATIVE PERCENT: 34.1 % (ref 20–42)
Lab: ABNORMAL
MAGNESIUM: 2.3 MG/DL (ref 1.6–2.6)
MAGNESIUM: 2.4 MG/DL (ref 1.6–2.6)
MCH RBC QN AUTO: 27.5 PG (ref 26–35)
MCH RBC QN AUTO: 29.1 PG (ref 26–35)
MCHC RBC AUTO-ENTMCNC: 29.8 % (ref 32–34.5)
MCHC RBC AUTO-ENTMCNC: 31.6 % (ref 32–34.5)
MCV RBC AUTO: 92 FL (ref 80–99.9)
MCV RBC AUTO: 92.2 FL (ref 80–99.9)
METER GLUCOSE: 159 MG/DL (ref 74–99)
METHB: 0.3 % (ref 0–1.5)
MODE: ABNORMAL
MONOCYTES ABSOLUTE: 0.35 E9/L (ref 0.1–0.95)
MONOCYTES ABSOLUTE: 0.55 E9/L (ref 0.1–0.95)
MONOCYTES RELATIVE PERCENT: 8.1 % (ref 2–12)
MONOCYTES RELATIVE PERCENT: 9.8 % (ref 2–12)
MYCOPLASMA PNEUMONIAE BY PCR: NOT DETECTED
NEUTROPHILS ABSOLUTE: 2.2 E9/L (ref 1.8–7.3)
NEUTROPHILS ABSOLUTE: 4.02 E9/L (ref 1.8–7.3)
NEUTROPHILS RELATIVE PERCENT: 51.1 % (ref 43–80)
NEUTROPHILS RELATIVE PERCENT: 71.2 % (ref 43–80)
O2 CONTENT: 14.3 ML/DL
O2 SATURATION: 92 % (ref 92–98.5)
O2HB: 90.6 % (ref 94–97)
OPERATOR ID: ABNORMAL
PARAINFLUENZA VIRUS 1 BY PCR: NOT DETECTED
PARAINFLUENZA VIRUS 2 BY PCR: NOT DETECTED
PARAINFLUENZA VIRUS 3 BY PCR: NOT DETECTED
PARAINFLUENZA VIRUS 4 BY PCR: NOT DETECTED
PATIENT TEMP: 37 C
PCO2: 39.4 MMHG (ref 35–45)
PDW BLD-RTO: 16.9 FL (ref 11.5–15)
PDW BLD-RTO: 16.9 FL (ref 11.5–15)
PERFORMED ON: ABNORMAL
PH BLOOD GAS: 7.42 (ref 7.35–7.45)
PHOSPHORUS: 4.2 MG/DL (ref 2.5–4.5)
PLATELET # BLD: 177 E9/L (ref 130–450)
PLATELET # BLD: 184 E9/L (ref 130–450)
PMV BLD AUTO: 10.2 FL (ref 7–12)
PMV BLD AUTO: 10.7 FL (ref 7–12)
PO2: 63 MMHG (ref 75–100)
POC CHLORIDE: 105 MMOL/L (ref 100–108)
POC CREATININE: 4 MG/DL (ref 0.5–1)
POC POTASSIUM: 4 MMOL/L (ref 3.5–5)
POC SODIUM: 140 MMOL/L (ref 132–146)
POTASSIUM REFLEX MAGNESIUM: 3.9 MMOL/L (ref 3.5–5)
POTASSIUM SERPL-SCNC: 4.2 MMOL/L (ref 3.5–5)
PRO-BNP: ABNORMAL PG/ML (ref 0–125)
PROTHROMBIN TIME: 11.5 SEC (ref 9.3–12.4)
RBC # BLD: 3.51 E12/L (ref 3.5–5.5)
RBC # BLD: 3.71 E12/L (ref 3.5–5.5)
RESPIRATORY SYNCYTIAL VIRUS BY PCR: NOT DETECTED
SARS-COV-2, PCR: NOT DETECTED
SODIUM BLD-SCNC: 138 MMOL/L (ref 132–146)
SODIUM BLD-SCNC: 140 MMOL/L (ref 132–146)
SOURCE, BLOOD GAS: ABNORMAL
T4 FREE: 1.58 NG/DL (ref 0.93–1.7)
THB: 11.2 G/DL (ref 11.5–16.5)
TIME ANALYZED: 1247
TOTAL CK: 57 U/L (ref 20–180)
TOTAL CK: 67 U/L (ref 20–180)
TOTAL PROTEIN: 6.8 G/DL (ref 6.4–8.3)
TOTAL PROTEIN: 6.9 G/DL (ref 6.4–8.3)
TRIGL SERPL-MCNC: 106 MG/DL (ref 0–149)
TROPONIN: 0.82 NG/ML (ref 0–0.03)
TROPONIN: 0.87 NG/ML (ref 0–0.03)
TSH SERPL DL<=0.05 MIU/L-ACNC: 2.81 UIU/ML (ref 0.27–4.2)
VLDLC SERPL CALC-MCNC: 21 MG/DL
WBC # BLD: 4.3 E9/L (ref 4.5–11.5)
WBC # BLD: 5.6 E9/L (ref 4.5–11.5)

## 2021-05-19 PROCEDURE — 99284 EMERGENCY DEPT VISIT MOD MDM: CPT

## 2021-05-19 PROCEDURE — 0202U NFCT DS 22 TRGT SARS-COV-2: CPT

## 2021-05-19 PROCEDURE — 83735 ASSAY OF MAGNESIUM: CPT

## 2021-05-19 PROCEDURE — 82550 ASSAY OF CK (CPK): CPT

## 2021-05-19 PROCEDURE — 85730 THROMBOPLASTIN TIME PARTIAL: CPT

## 2021-05-19 PROCEDURE — 85610 PROTHROMBIN TIME: CPT

## 2021-05-19 PROCEDURE — 70450 CT HEAD/BRAIN W/O DYE: CPT

## 2021-05-19 PROCEDURE — 82805 BLOOD GASES W/O2 SATURATION: CPT

## 2021-05-19 PROCEDURE — 80048 BASIC METABOLIC PNL TOTAL CA: CPT

## 2021-05-19 PROCEDURE — 93005 ELECTROCARDIOGRAM TRACING: CPT | Performed by: STUDENT IN AN ORGANIZED HEALTH CARE EDUCATION/TRAINING PROGRAM

## 2021-05-19 PROCEDURE — 0042T CT BRAIN PERFUSION: CPT | Performed by: RADIOLOGY

## 2021-05-19 PROCEDURE — 36556 INSERT NON-TUNNEL CV CATH: CPT

## 2021-05-19 PROCEDURE — 71045 X-RAY EXAM CHEST 1 VIEW: CPT

## 2021-05-19 PROCEDURE — APPSS60 APP SPLIT SHARED TIME 46-60 MINUTES: Performed by: NURSE PRACTITIONER

## 2021-05-19 PROCEDURE — 6360000002 HC RX W HCPCS: Performed by: STUDENT IN AN ORGANIZED HEALTH CARE EDUCATION/TRAINING PROGRAM

## 2021-05-19 PROCEDURE — 0042T CT BRAIN PERFUSION: CPT

## 2021-05-19 PROCEDURE — 96365 THER/PROPH/DIAG IV INF INIT: CPT

## 2021-05-19 PROCEDURE — 36415 COLL VENOUS BLD VENIPUNCTURE: CPT

## 2021-05-19 PROCEDURE — 99223 1ST HOSP IP/OBS HIGH 75: CPT | Performed by: INTERNAL MEDICINE

## 2021-05-19 PROCEDURE — 6360000004 HC RX CONTRAST MEDICATION: Performed by: RADIOLOGY

## 2021-05-19 PROCEDURE — 96375 TX/PRO/DX INJ NEW DRUG ADDON: CPT

## 2021-05-19 PROCEDURE — 6360000002 HC RX W HCPCS: Performed by: EMERGENCY MEDICINE

## 2021-05-19 PROCEDURE — 2000000000 HC ICU R&B

## 2021-05-19 PROCEDURE — 85025 COMPLETE CBC W/AUTO DIFF WBC: CPT

## 2021-05-19 PROCEDURE — 36592 COLLECT BLOOD FROM PICC: CPT

## 2021-05-19 PROCEDURE — 99448 NTRPROF PH1/NTRNET/EHR 21-30: CPT | Performed by: PSYCHIATRY & NEUROLOGY

## 2021-05-19 PROCEDURE — 70498 CT ANGIOGRAPHY NECK: CPT | Performed by: RADIOLOGY

## 2021-05-19 PROCEDURE — 83880 ASSAY OF NATRIURETIC PEPTIDE: CPT

## 2021-05-19 PROCEDURE — 6370000000 HC RX 637 (ALT 250 FOR IP): Performed by: INTERNAL MEDICINE

## 2021-05-19 PROCEDURE — 02HV33Z INSERTION OF INFUSION DEVICE INTO SUPERIOR VENA CAVA, PERCUTANEOUS APPROACH: ICD-10-PCS | Performed by: INTERNAL MEDICINE

## 2021-05-19 PROCEDURE — 06HY33Z INSERTION OF INFUSION DEVICE INTO LOWER VEIN, PERCUTANEOUS APPROACH: ICD-10-PCS | Performed by: INTERNAL MEDICINE

## 2021-05-19 PROCEDURE — 83036 HEMOGLOBIN GLYCOSYLATED A1C: CPT

## 2021-05-19 PROCEDURE — 84443 ASSAY THYROID STIM HORMONE: CPT

## 2021-05-19 PROCEDURE — 70496 CT ANGIOGRAPHY HEAD: CPT

## 2021-05-19 PROCEDURE — 84439 ASSAY OF FREE THYROXINE: CPT

## 2021-05-19 PROCEDURE — 80061 LIPID PANEL: CPT

## 2021-05-19 PROCEDURE — 4A03X5D MEASUREMENT OF ARTERIAL FLOW, INTRACRANIAL, EXTERNAL APPROACH: ICD-10-PCS | Performed by: RADIOLOGY

## 2021-05-19 PROCEDURE — 70496 CT ANGIOGRAPHY HEAD: CPT | Performed by: RADIOLOGY

## 2021-05-19 PROCEDURE — 2500000003 HC RX 250 WO HCPCS

## 2021-05-19 PROCEDURE — 84484 ASSAY OF TROPONIN QUANT: CPT

## 2021-05-19 PROCEDURE — 93010 ELECTROCARDIOGRAM REPORT: CPT | Performed by: INTERNAL MEDICINE

## 2021-05-19 PROCEDURE — 82553 CREATINE MB FRACTION: CPT

## 2021-05-19 PROCEDURE — 70498 CT ANGIOGRAPHY NECK: CPT

## 2021-05-19 PROCEDURE — 80053 COMPREHEN METABOLIC PANEL: CPT

## 2021-05-19 PROCEDURE — 80076 HEPATIC FUNCTION PANEL: CPT

## 2021-05-19 PROCEDURE — 84100 ASSAY OF PHOSPHORUS: CPT

## 2021-05-19 PROCEDURE — 93005 ELECTROCARDIOGRAM TRACING: CPT | Performed by: EMERGENCY MEDICINE

## 2021-05-19 RX ORDER — LIDOCAINE HYDROCHLORIDE ANHYDROUS AND DEXTROSE MONOHYDRATE .4; 5 G/100ML; G/100ML
1 INJECTION, SOLUTION INTRAVENOUS CONTINUOUS
Status: DISCONTINUED | OUTPATIENT
Start: 2021-05-19 | End: 2021-05-20

## 2021-05-19 RX ORDER — ASPIRIN 81 MG/1
324 TABLET, CHEWABLE ORAL ONCE
Status: COMPLETED | OUTPATIENT
Start: 2021-05-19 | End: 2021-05-19

## 2021-05-19 RX ORDER — HEPARIN SODIUM 1000 [USP'U]/ML
2000 INJECTION, SOLUTION INTRAVENOUS; SUBCUTANEOUS PRN
Status: DISCONTINUED | OUTPATIENT
Start: 2021-05-19 | End: 2021-05-21

## 2021-05-19 RX ORDER — ASPIRIN 81 MG/1
81 TABLET ORAL DAILY
Status: DISCONTINUED | OUTPATIENT
Start: 2021-05-20 | End: 2021-05-25 | Stop reason: HOSPADM

## 2021-05-19 RX ORDER — DEXTROSE MONOHYDRATE 25 G/50ML
12.5 INJECTION, SOLUTION INTRAVENOUS PRN
Status: DISCONTINUED | OUTPATIENT
Start: 2021-05-19 | End: 2021-05-25 | Stop reason: HOSPADM

## 2021-05-19 RX ORDER — ISOSORBIDE MONONITRATE 30 MG/1
30 TABLET, EXTENDED RELEASE ORAL DAILY
COMMUNITY
End: 2021-07-01 | Stop reason: SDUPTHER

## 2021-05-19 RX ORDER — KETAMINE HYDROCHLORIDE 10 MG/ML
20 INJECTION, SOLUTION INTRAMUSCULAR; INTRAVENOUS ONCE
Status: COMPLETED | OUTPATIENT
Start: 2021-05-19 | End: 2021-05-19

## 2021-05-19 RX ORDER — INSULIN GLARGINE 100 [IU]/ML
5 INJECTION, SOLUTION SUBCUTANEOUS NIGHTLY
COMMUNITY
End: 2021-12-28

## 2021-05-19 RX ORDER — NICOTINE POLACRILEX 4 MG
15 LOZENGE BUCCAL PRN
Status: DISCONTINUED | OUTPATIENT
Start: 2021-05-19 | End: 2021-05-25 | Stop reason: HOSPADM

## 2021-05-19 RX ORDER — ATORVASTATIN CALCIUM 40 MG/1
40 TABLET, FILM COATED ORAL NIGHTLY
Status: DISCONTINUED | OUTPATIENT
Start: 2021-05-19 | End: 2021-05-25 | Stop reason: HOSPADM

## 2021-05-19 RX ORDER — PANTOPRAZOLE SODIUM 40 MG/10ML
40 INJECTION, POWDER, LYOPHILIZED, FOR SOLUTION INTRAVENOUS ONCE
Status: COMPLETED | OUTPATIENT
Start: 2021-05-19 | End: 2021-05-20

## 2021-05-19 RX ORDER — KETAMINE HYDROCHLORIDE 10 MG/ML
20 INJECTION, SOLUTION INTRAMUSCULAR; INTRAVENOUS ONCE
Status: DISCONTINUED | OUTPATIENT
Start: 2021-05-19 | End: 2021-05-20

## 2021-05-19 RX ORDER — DEXTROSE MONOHYDRATE 50 MG/ML
100 INJECTION, SOLUTION INTRAVENOUS PRN
Status: DISCONTINUED | OUTPATIENT
Start: 2021-05-19 | End: 2021-05-25 | Stop reason: HOSPADM

## 2021-05-19 RX ORDER — SODIUM CHLORIDE 0.9 % (FLUSH) 0.9 %
10 SYRINGE (ML) INJECTION PRN
Status: DISCONTINUED | OUTPATIENT
Start: 2021-05-19 | End: 2021-05-25 | Stop reason: HOSPADM

## 2021-05-19 RX ORDER — HEPARIN SODIUM 10000 [USP'U]/100ML
11.5 INJECTION, SOLUTION INTRAVENOUS CONTINUOUS
Status: DISCONTINUED | OUTPATIENT
Start: 2021-05-19 | End: 2021-05-21

## 2021-05-19 RX ORDER — PANTOPRAZOLE SODIUM 40 MG/10ML
40 INJECTION, POWDER, LYOPHILIZED, FOR SOLUTION INTRAVENOUS DAILY
Status: DISCONTINUED | OUTPATIENT
Start: 2021-05-20 | End: 2021-05-21

## 2021-05-19 RX ORDER — KETAMINE HYDROCHLORIDE 10 MG/ML
INJECTION, SOLUTION INTRAMUSCULAR; INTRAVENOUS
Status: COMPLETED
Start: 2021-05-19 | End: 2021-05-19

## 2021-05-19 RX ORDER — LIDOCAINE HYDROCHLORIDE 10 MG/ML
INJECTION, SOLUTION EPIDURAL; INFILTRATION; INTRACAUDAL; PERINEURAL
Status: DISPENSED
Start: 2021-05-19 | End: 2021-05-20

## 2021-05-19 RX ORDER — HEPARIN SODIUM 1000 [USP'U]/ML
4000 INJECTION, SOLUTION INTRAVENOUS; SUBCUTANEOUS PRN
Status: DISCONTINUED | OUTPATIENT
Start: 2021-05-19 | End: 2021-05-21

## 2021-05-19 RX ORDER — INSULIN LISPRO 100 [IU]/ML
0-4 INJECTION, SOLUTION INTRAVENOUS; SUBCUTANEOUS 3 TIMES DAILY
COMMUNITY
End: 2021-11-16

## 2021-05-19 RX ORDER — OMEPRAZOLE 20 MG/1
20 CAPSULE, DELAYED RELEASE ORAL DAILY PRN
Status: ON HOLD | COMMUNITY
End: 2022-08-10 | Stop reason: HOSPADM

## 2021-05-19 RX ORDER — SODIUM CHLORIDE 9 MG/ML
25 INJECTION, SOLUTION INTRAVENOUS PRN
Status: DISCONTINUED | OUTPATIENT
Start: 2021-05-19 | End: 2021-05-24

## 2021-05-19 RX ORDER — HEPARIN SODIUM 1000 [USP'U]/ML
4000 INJECTION, SOLUTION INTRAVENOUS; SUBCUTANEOUS ONCE
Status: COMPLETED | OUTPATIENT
Start: 2021-05-19 | End: 2021-05-19

## 2021-05-19 RX ORDER — BUMETANIDE 2 MG/1
2 TABLET ORAL
COMMUNITY
End: 2021-09-17

## 2021-05-19 RX ORDER — SODIUM CHLORIDE 9 MG/ML
10 INJECTION INTRAVENOUS DAILY
Status: DISCONTINUED | OUTPATIENT
Start: 2021-05-20 | End: 2021-05-21

## 2021-05-19 RX ORDER — RENO CAPS 100; 1.5; 1.7; 20; 10; 1; 150; 5; 6 MG/1; MG/1; MG/1; MG/1; MG/1; MG/1; UG/1; MG/1; UG/1
1 CAPSULE ORAL NIGHTLY
Status: ON HOLD | COMMUNITY
End: 2022-08-10 | Stop reason: HOSPADM

## 2021-05-19 RX ADMIN — LIDOCAINE HYDROCHLORIDE 100 MG: 20 INJECTION, SOLUTION INTRAVENOUS at 16:20

## 2021-05-19 RX ADMIN — KETAMINE HYDROCHLORIDE 40 MG: 10 INJECTION, SOLUTION INTRAMUSCULAR; INTRAVENOUS at 13:10

## 2021-05-19 RX ADMIN — HEPARIN SODIUM 11.5 UNITS/KG/HR: 10000 INJECTION, SOLUTION INTRAVENOUS at 15:05

## 2021-05-19 RX ADMIN — ASPIRIN 324 MG: 81 TABLET, CHEWABLE ORAL at 19:18

## 2021-05-19 RX ADMIN — IOPAMIDOL 100 ML: 755 INJECTION, SOLUTION INTRAVENOUS at 13:26

## 2021-05-19 RX ADMIN — LIDOCAINE HYDROCHLORIDE ANHYDROUS AND DEXTROSE MONOHYDRATE 1 MG/MIN: .4; 5 INJECTION, SOLUTION INTRAVENOUS at 16:21

## 2021-05-19 RX ADMIN — HEPARIN SODIUM 4000 UNITS: 1000 INJECTION INTRAVENOUS; SUBCUTANEOUS at 15:04

## 2021-05-19 RX ADMIN — NITROGLYCERIN 1 INCH: 20 OINTMENT TOPICAL at 19:18

## 2021-05-19 ASSESSMENT — PAIN DESCRIPTION - LOCATION
LOCATION: SHOULDER
LOCATION: SHOULDER

## 2021-05-19 ASSESSMENT — PAIN SCALES - GENERAL
PAINLEVEL_OUTOF10: 5
PAINLEVEL_OUTOF10: 2

## 2021-05-19 ASSESSMENT — PAIN DESCRIPTION - PAIN TYPE: TYPE: CHRONIC PAIN

## 2021-05-19 NOTE — ED NOTES
Pt asleep at this time, titrated to 4L [place]. Brother at bedside, updated by Dr Davida Hernandez. No needs or distress noted at this time. Nasal trumpet removed.  Pt on cardiac monitor      France Morales RN  05/19/21 8053

## 2021-05-19 NOTE — ED NOTES
Heparin started. VS updated. Dr Laurie Petit at bedside. No needs or distress noted. Pt talking, slurred speech. Pt not making sense. Pt keeps repeating herself, restless and confused.       Kenya Miguel RN  05/19/21 4081

## 2021-05-19 NOTE — PROCEDURES
Central Line Insertion     Procedure: left internal jugular vein Triple Lumen Catheter placement. Indications: vascular access and poor peripheral access    Consent: The family members were counseled regarding the procedure, its indications, risks, potential complications and alternatives, and any questions were answered. Consent was obtained to proceed. Number of sticks: 1    Number of Kits used: 1    Procedure: Time Out: Immediately prior to the procedure a \"timeout\" was called to verify the correct patient and procedure. The patient was place in the trendelenburg position and the skin over the left internal jugular vein was prepped with betadine and draped in a sterile fashion. Local anesthesia was obtained by infiltration using 1% Lidocaine without epinephrine. With Ultrasound guidance a large bore needle was used to identify the vein, dark non pulsatile blood returned. The guide wire was then inserted through the needle with minimal resistance. 2 mm nick was made in the skin beside the guidewire. Then a dilator was inserted and removed. A triple lumen catheter was then inserted into the vessel over the guide wire using the Seldinger technique was only able to advance the catheter till about 20cm. All ports showed good, free flowing blood return and were flushed with saline solution. The catheter was then securely fastened to the skin with sutures and covered with a bio patch and sterile dressing. A post procedure X-ray was ordered and showed the line terminates above SVC. Complications: None   The patient tolerated the procedure well. Estimated blood loss: 5 ml.     Lyudmila Quinonez MD PGY-2  5/19/2021  7:38 PM      Attending:

## 2021-05-19 NOTE — Clinical Note
Patient Class: Inpatient [101]   REQUIRED: Diagnosis: Cardiac arrest ([facility] Louie. 5. ICD-9-CM]   Estimated Length of Stay: Estimated stay of more than 2 midnights   Telemetry/Cardiac Monitoring Required?: Yes

## 2021-05-19 NOTE — CONSULTS
week ago. Patient is able to give us her past medical history accurately but seems to forget conversation within 10 minutes-restarting questioning with 'why am I here?', '  What happened? I need to call my sister'. CURRENT VENTILATION STATUS:   [] Ventilator  [] BIPAP  [x] Nasal Cannula [] Room Air      VASOPRESSORS:  [x] No    [] Yes     CENTRAL LINES:     [x] No   [] Yes   (Date of Insertion:   )           If yes -     [] Right IJ     [] Left IJ [] Right Femoral [] Left Femoral                   [] Right Subclavian [] Left Subclavian     SOTOMAYOR'S CATHETER:   [x] No   [] Yes  (Date of Insertion:   )     URINE OUTPUT:            [] Good   [] Low              [x] Anuric    REVIEW OF SYSTEMS:    · Constitutional: No fever, no chills, no change in weight; good appetite  · HEENT: No blurred vision, no ear problems, no sore throat, no rhinorrhea. · Respiratory: No cough, no sputum production, no pleuritic chest pain, no shortness of breath  · Cardiology: No angina, no dyspnea on exertion, no paroxysmal nocturnal dyspnea, no orthopnea, no palpitation, no leg swelling. · Gastroenterology: No dysphagia, no reflux; no abdominal pain, no nausea or vomiting; no constipation or diarrhea.  No hematochezia   · Genitourinary: No dysuria, no frequency, hesitancy; no hematuria  · Musculoskeletal: no joint pain, no myalgia, no change in range of movement  · Neurology: no focal weakness in extremities, no slurred speech, no double vision, no tingling or numbness sensation  · Endocrinology: no temperature intolerance, no polyphagia, polydipsia or polyuria  · Hematology: no increased bleeding, no bruising, no lymphadenopathy  · Skin: no skin changes noticed by patient  · Psychology: no depressed mood, no suicidal ideation    OBJECTIVE:     VITAL SIGNS:  /73   Pulse 62   Temp 97.3 °F (36.3 °C) (Core)   Resp 18   Wt 189 lb (85.7 kg)   SpO2 96%   BMI 27.12 kg/m²   Tmax over 24 hours:  Temp (24hrs), Av.3 °F (36.3 °C), Min:97.3 °F (36.3 °C), Max:97.3 °F (36.3 °C)      Patient Vitals for the past 6 hrs:   BP Temp Temp src Pulse Resp SpO2 Weight   05/19/21 1658 137/73 97.3 °F (36.3 °C) CORE 62 18 96 % --   05/19/21 1500 127/76 -- -- 64 16 95 % --   05/19/21 1445 -- -- -- -- -- -- 189 lb (85.7 kg)   05/19/21 1345 123/60 -- -- 63 -- 95 % --   05/19/21 1247 (!) 150/133 97.3 °F (36.3 °C) -- 79 16 96 % --       No intake or output data in the 24 hours ending 05/19/21 1734  Wt Readings from Last 2 Encounters:   05/19/21 189 lb (85.7 kg)   05/19/21 189 lb (85.7 kg)     Body mass index is 27.12 kg/m². PHYSICAL EXAMINATION:  Physical Exam  Vitals and nursing note reviewed. Constitutional:       General: She is not in acute distress. Appearance: She is obese. She is ill-appearing. HENT:      Head: Normocephalic and atraumatic. Mouth/Throat:      Mouth: Mucous membranes are moist.   Eyes:      Extraocular Movements: Extraocular movements intact. Pupils: Pupils are equal, round, and reactive to light. Cardiovascular:      Rate and Rhythm: Normal rate and regular rhythm. Heart sounds: No murmur heard. No friction rub. Pulmonary:      Effort: Pulmonary effort is normal.      Breath sounds: Normal breath sounds. Abdominal:      General: Bowel sounds are normal.      Palpations: Abdomen is soft. Musculoskeletal:         General: No swelling or tenderness. Right lower leg: No edema. Left lower leg: No edema. Skin:     General: Skin is warm. Capillary Refill: Capillary refill takes less than 2 seconds. Neurological:      Mental Status: She is alert. She is disoriented.               MEDICATIONS:  Scheduled Meds:   ketamine  20 mg Intravenous Once     Continuous Infusions:   sodium chloride      heparin (PORCINE) Infusion 11.5 Units/kg/hr (05/19/21 1505)    lidocaine 1 mg/min (05/19/21 1621)     PRN Meds:   sodium chloride flush, 10 mL, PRN  sodium chloride, 25 mL, PRN  heparin (porcine), memory loss noticed. 6. History of right breast carcinoma s/p right mastectomy as/P XRT with residual right upper extremity lymphedema  7. History of bladder carcinoma s/p chemotherapy  8. Hinduism    Plan  -Started on aspirin. continue IV heparin, topical nitrates lidocaine per cardiology. Plan for left heart cath on 5/20/2021 versus Cephus Severs depending on changes in mentation  -Start beta-blocker therapy, continue statin  -Follow H&H  -Follow telemetry stroke recommendation -EEG, MRI, echocardiogram.   -SBP goal less than 220/110, neurochecks every 4 -stat imaging if change in mentation and hold heparin drip.  -Consult nephrology for ESRD management  -Swallow assessment given patient's mentation  -Per patient's history Mediport was accessed recently-not certain about when and where-blood cultures were ordered  -Hypoglycemia protocol POC every 4      ICU PROPHYLAXIS:  Stress ulcer:  [x] PPI Agent  [] L0Smcdl [] Sucralfate  [] Other:  VTE:   [] Enoxaparin  [x] Unfract. Heparin Subcut  [] EPC Cuffs    NUTRITION:  [x] NPO [] Tube Feeding (Specify: ) [] TPN  [] PO (Diet: No diet orders on file)    CONSULTATION NEEDED:   [] No   [x] Yes    FAMILY UPDATED:    [] No   [x] Yes    TRANSFER OUT OF ICU:   [x] No   [] Yes    Josie Catalan MD, MD PGY-2  Attending Physician: Dr. Tremaine Esposito   5/19/2021, 5:34 PM     I personally saw, examined and provided care for the patient. Radiographs, labs and medication list were reviewed by me independently. I spoke with bedside nursing, therapists and consultants. Critical care services and times documented are independent of procedures and multidisciplinary rounds with Residents. Additionally comprehensive, multidisciplinary rounds were conducted with the MICU team. The case was discussed in detail and plans for care were established. Review of Residents documentation was conducted and revisions were made as appropriate.  I agree with the above documented exam, problem list and plan of care.     Chest pain   Possible CAD  Bedside echo ,EF less than 30 %   Discuss with dr Debbie bishop in [place] [address]  Pulmonary&Critical Care Medicine   Director of [address] [place] [address] Director of [address] [place] [address]    Rose Asif

## 2021-05-19 NOTE — PLAN OF CARE
Problem: Skin Integrity:  Goal: Will show no infection signs and symptoms  Description: Will show no infection signs and symptoms  Outcome: Met This Shift  Goal: Absence of new skin breakdown  Description: Absence of new skin breakdown  Outcome: Met This Shift     Problem: Falls - Risk of:  Goal: Will remain free from falls  Description: Will remain free from falls  Outcome: Met This Shift  Goal: Absence of physical injury  Description: Absence of physical injury  Outcome: Met This Shift     Problem: Pain:  Description: Pain management should include both nonpharmacologic and pharmacologic interventions.   Goal: Pain level will decrease  Description: Pain level will decrease  Outcome: Met This Shift  Goal: Control of acute pain  Description: Control of acute pain  Outcome: Met This Shift  Goal: Control of chronic pain  Description: Control of chronic pain  Outcome: Met This Shift

## 2021-05-19 NOTE — PROGRESS NOTES
De accessed, HD Fistula left upper arm. Needles were clamped post arrest at dialysis clinic d/t cardiac arrest. Needles removed intact, hemostasis achieved after 15 min of manual pressure, positive bruit/thrill, gauze and tape applied.

## 2021-05-19 NOTE — ED NOTES
Dr Davida Hernandez called to bedside, pt in [place] . Repeat EKG done. Pt talking, no signs of distress noted.      France Morales RN  05/19/21 [id number]

## 2021-05-19 NOTE — FLOWSHEET NOTE
PT clergyman called concernig that pt do not receive blood transfusion Jehovah Witness.  Spoke with brother who was present and conveyed message to medical team.

## 2021-05-19 NOTE — CONSULTS
Patient seen and examined. Chart, labs, imaging studies, EKG and rhythm strips reviewed. Full consult to follow. Consulted for post cardiac arrest    IMPRESSION:  1. Episode of unresponsiveness with reported shockable rhythm by AED s/p defibrillation x 1  2. Reported VT in ED Lidocaine gtt   3. AMS, confusion  4. Elevated troponin (more than baseline)  5. No acute ischemic EKG changes  6. Known history of CAD, treated medically. 7. TTE 11/2020 at [facility] EF 60-65% with moderate CLVH and no reported valvular abnormalities  8. ESRD on HD through LUE AVF  9. Anemia  10. Elevated LFT's  11. HTN  12. HLD  13. Obesity  14. T2DM insulin requiring with neuropathy/retinopathy, nephropathy and R foot charcot  15. R breast breast carcinoma s/p R masectomy s/p XRT. 16. RUE lymphedema  17. Bladder carcinoma s/p chemotherapy  18. Gait difficulty ambulates with walker  19. Jehovah Witness      PLAN:   1. Continue IV Heparin gtt  2. Continue Lidocaine  3. Start ASA  4. Resume Lipitor 40 mg QD  5. Start topical nitrates (instead of oral)  6. Start BB therapy  7. Fluid management/diurectics/dialysisi as per renal  8. C 5/20/2021 versus Lexiscan MPS pending improvement in mental status  9.  Further recommendations to follow    Electronically signed by Norma Frausto MD on 5/19/2021 at 5:15 PM

## 2021-05-19 NOTE — VIRTUAL HEALTH
Consults  Patient Location:  [address] [place] [address] Emergency Department    Provider Location (City/State): [place]    This virtual visit was conducted via interactive/real-time audio/video. [facility] AT [place] Stroke and Vascular Neurology Consult for  [address] Stroke Alert through [address] @ 12;50pm  5/19/2021 1:34 PM  Pt Name: Jabari Parisi  MRN: [id number]  YOB: 1955  Date of evaluation: 5/19/2021  Primary Care Physician: Cheryl Orozco MD  Reason for Evaluation: Stroke evaluation with Phone Consult, Discussion and Review of imaging    Jabari Parisi is a 73 y.o. female who presents with unresponsiveness while getting dialysis today in the morning. lkw unknown. Patient coded afterwards and received Electic shock x 1.patient was also noted to have r arm weakness. Patients symptoms improved afterwards. LKW: unknown  NIH:  7 -->4      Glucose  171  Cr. 3.9    Other co-morbidities include:CAD,Breast Cancer,DM,HLD,HTN,Obesity,PVD,ESRD on HD,Jehova's witness,vitreous hemorrhage    Allergies  is allergic to lasix [furosemide]. Medications  Prior to Admission medications    Medication Sig Start Date End Date Taking?  Authorizing Provider   atorvastatin (LIPITOR) 40 MG tablet Take 1 tablet by mouth daily 3/8/21   Cheryl Orozco MD   isosorbide mononitrate (IMDUR) 30 MG extended release tablet TAKE ONE TABLET BY MOUTH DAILY 3/5/21   Cheryl Orozco MD   allopurinol (ZYLOPRIM) 100 MG tablet Take 1 tablet by mouth daily 3/5/21   Cheryl Orozco MD   diclofenac sodium (VOLTAREN) 1 % GEL Apply 2 g topically 2 times daily 2/25/21   Cheryl Orozco MD   insulin glargine (LANTUS SOLOSTAR) 100 UNIT/ML injection pen inject 8 units into the skin nightly 2/25/21   Cheryl Orozco MD   triamcinolone (KENALOG) 0.1 % cream APPLY 2 TIMES A DAY  NEVER TO FACE 12/29/20   Historical Provider, MD   bumetanide (BUMEX) 2 MG tablet Take 1 tablet by mouth three times a week Sund, Tues, Thurs, 12/18/20   Ivy Ware MD   omeprazole (PRILOSEC) 20 MG delayed release capsule TAKE ONE CAPSULE BY MOUTH EVERY DAY AT BEDTIME 11/23/20   Ivy Ware MD   B Complex-C-Folic Acid (RENAL-FADI) 0.8 MG TABS Take 0.8 tablets by mouth    Historical Provider, MD   Insulin Pen Needle (PEN NEEDLES) 31G X 6 MM MISC 1 each by Does not apply route daily 8/6/20   MD LOLITA Velasco 0.01 % SOLN ophthalmic drops instill 1 drop every evening into right eye 6/9/20   Historical Provider, MD   cyanocobalamin 1000 MCG/ML injection Inject 1,000 mcg into the muscle once Once a month at dialysis    Historical Provider, MD   VELPHORO 500 MG CHEW CRUSH OR CHEW AND SWALLOW 2 TABLETS 3 TIMES A DAY WITH MEALS 1/9/20   Historical Provider, MD   gabapentin (NEURONTIN) 100 MG capsule Take 2 capsules by mouth 3 times daily for 30 days. Patient taking differently: Take 200 mg by mouth as needed. 2/6/20 2/25/21  Mary Frank DO   lidocaine-prilocaine (EMLA) 2.5-2.5 % cream APPLY SMALL AMOUNT TO ACCESS SITE (AVF) 1 HOUR BEFORE DIALYSIS. COVER WITH OCCLUSIVE DRESSING (SARAN WRAP) 10/25/19   Historical Provider, MD   COMBIGAN 0.2-0.5 % ophthalmic solution INSTILL ONE DROP INTO THE RIGHT EYE TWICE A DAY 8/1/19   Historical Provider, MD   camphor-menthol (SARNA) 0.5-0.5 % lotion Apply topically as needed. 5/21/19   Thao Valdes MD   insulin lispro (HUMALOG KWIKPEN) 100 UNIT/ML pen Take 2 units > 140 , take 3 units > 170, take 4 units > 200 units  Patient taking differently: Sliding scale as needed with meals 5/3/19   Thao Valdes MD   blood glucose test strips (ACCU-CHEK ACTIVE STRIPS) strip 1 each by In Vitro route 2 times daily As needed.  12/4/18   Thao Valdes MD   sevelamer (RENVELA) 800 MG tablet Take 1 tablet by mouth 3 times daily (with meals)    Historical Provider, MD   darbepoetin fani-polysorbate (ARANESP) 40 MCG/0.4ML SOLN injection Inject 0.4 mLs into the skin once a week. Patient taking differently: Inject 40 mcg into the skin every 14 days  10/31/14   Brianna Noguera MD    Scheduled Meds:   ketamine         Continuous Infusions:   sodium chloride       PRN Meds:.sodium chloride flush, sodium chloride  Past Medical History   has a past medical history of Acute infection of bone ([facility] Anemia of chronic disease, Breast cancer ([facility] CAD (coronary artery disease), Carpal tunnel syndrome, Chronic diastolic CHF (congestive heart failure) ([facility] CKD (chronic kidney disease) stage 4, GFR 15-29 ml/min ([facility] Diabetic retinopathy ([facility] Glaucoma, Hemodialysis patient ([facility] Hyperkalemia, diminished renal excretion, Hypertension, Hypoglycemia unawareness in type 1 diabetes mellitus ([facility] Insulin dependent type 2 diabetes mellitus ([facility] Neuropathy, Osteomyelitis due to secondary diabetes [facility]), Patient is Sabianist, Refusal of blood product, Ventricular hypertrophy, and Vitreous hemorrhage ([facility] Social History  Social History     Socioeconomic History    Marital status: Single     Spouse name: Not on file    Number of children: Not on file    Years of education: Not on file    Highest education level: Not on file   Occupational History    Not on file   Tobacco Use    Smoking status: Never Smoker    Smokeless tobacco: Never Used   Vaping Use    Vaping Use: Never used   Substance and Sexual Activity    Alcohol use: No    Drug use: No    Sexual activity: Not Currently   Other Topics Concern    Not on file   Social History Narrative    Not on file     Social Determinants of Health     Financial Resource Strain:     Difficulty of Paying Living Expenses:    Food Insecurity:     Worried About Running Out of Food in the Last Year:     [address] in the Last Year:    Transportation Needs:     Lack of Transportation (Medical):      Lack of Transportation (Non-Medical):    Physical Activity:     Days of Exercise per Week:     Minutes of Exercise per Session:    Stress:     Feeling of Stress :    Social Connections:     Frequency of Communication with Friends and Family:     Frequency of Social Gatherings with Friends and Family:     Attends [employer] Services:     Active Member of Clubs or Organizations:     Attends Club or Organization Meetings:     Marital Status:    Intimate Partner Violence:     Fear of Current or Ex-Partner:     Emotionally Abused:     Physically Abused:     Sexually Abused:      Family History      Problem Relation Age of Onset    Breast Cancer Mother 61    Hypertension Mother     Heart Disease Father     Prostate Cancer Father     Breast Cancer Maternal Grandmother 60       OBJECTIVE  BP (!) 150/133   Pulse 79   Temp 97.3 °F (36.3 °C)   Resp 16   SpO2 96%        Pre-Morbid mRS: 0    Imaging:  Images were personally reviewed with CHRISTINA MURRAY used to review images including:  CT brain without contrast: no ich  CTA imaging: no ,mild athero,no anoxic brain injury    Assessment     72 y/o F with unresponsiveness during dialysis,coded,electric shock x 1 ,noted to have right arm weakness which resolved later on  Differential DDx:  1. TIA/Stroke due to embolic phenomena vs anoxic brain injury vs seizure    Other co-morbidities include:CAD,BREAST CANCER,DM,HLD,HTN,Obesity,PVD,ESRD on HD,Jehova's witness,vitreous hemorrhage    Recommendations:  1. NIH 7--- >improved to 4,moving all extremities equally  2.  Recommend Inpatient Neurology Consult for further assessment and evaluation   Patient presents with stroke-like symptoms but is not a tPA candidate due to:  No TPA indicated since time of onset unclear,patient's symptoms improved with no residual weakness  EEG    Admit per non-tPA/TIA protocol  Keep on Telemetry  HOB < 15 degrees for 12 hours  Liberalize blood pressure for 24 to 48 hours, keep SBP < 220 and DBP < 110  Keep NPO unless passes bedside dysphagia screen or swallow evaluation. Obtain MRI brain W w/o contrast,transthoracic echo--to r/o structural heart disease that can lead to source of emboli, fasting lipid panel, HgbA1c. SCD and SQ heparinoids for DVT prevention. IVF with NS @ 100cc per hr  PT/OT/SLP.     --initiation of statin therapy is recommended to reduce risk of stroke and CV events, in patients with stroke/tia presumed to be of atherosclerotic origin and a LDL level >100mg/dl w/ or w/o evidence of CV disease. 3. --initiation of blood pressure therapy is indicated for previously untreated patients with stroke/tia who after the first several days have an established BP >147JLUJ systolic or >72GZNX diastolic. Recommend HCTZ +/- ACE-I/ARB for greater stroke prevention          Discussed with ED Physician      This is a Phone Consult, I have not seen the patient face to face, the telemedicine device was not utilized.     Anderson Bob MD, MD   Stroke, Neurocritical Care And/or [facility] Stroke [address]  Electronically signed 5/19/2021 at 1:34 PM

## 2021-05-19 NOTE — ED PROVIDER NOTES
Department of Emergency Medicine   ED  Provider Note  Admit Date/RoomTime: 5/19/2021 12:41 PM  ED Room: [facility]6/4416-A          History of Present Illness:  5/19/21, Time: 12:48 PM EDT  Chief Complaint   Patient presents with    Altered Mental Status     ams at dialysis, went unresponsive and lost pulses, 1 shock given at 221 Francie Meier is a 73 y.o. female presenting to the ED for altered mental status, beginning just prior to arrival.  The complaint has been persistent, severe in severity, and worsened by nothing. The patient is a 81-year-old female with a history of CKD on dialysis, anemia of chronic disease, CHF, CAD, diabetes who presents to the emergency department via EMS for altered mental status. The patient symptoms are sudden onset, persistent, severe, and nothing makes it better or worse. Of note the patient is a Buddhism and refuses blood products. Apparently the patient was at dialysis at this morning at [facility], and while she was retrieving her treatment she suddenly went unresponsive at 11:30 AM.  The nurse had just checked on her prior to that and she was acting normally at that time. Apparently she went unresponsive and when they checked on her she had lost pulses. EMS was called and the patient was given 1 shock at dialysis as a shock was advised. However, they do not know what the initial heart rhythm was when this occurred. Patient is altered on arrival and unable to provide any history. We did contact the dialysis center and the patient's family for further information. I called patient's Sister Rose Mary Frederick, who states that she has been complaining of chest pain last week, but did not have any chest pain last night or this morning. Unable to obtain review of systems due to the patient's mentation.     Review of Systems: Limited due to the patient's mentation and condition        --------------------------------------------- PAST HISTORY ---------------------------------------------  Past Medical History:  has a past medical history of [facility] Anemia of chronic disease, Breast cancer ([facility] CAD (coronary artery disease), Carpal tunnel syndrome, Chronic diastolic CHF (congestive heart failure) ([facility] CKD (chronic kidney disease) stage 4, GFR 15-29 ml/min ([facility] Diabetic retinopathy ([facility] Glaucoma, Hemodialysis patient ([facility] Hyperkalemia, diminished renal excretion, Hypertension, 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 Past Surgical History:  has a past surgical history that includes Cholecystectomy; amputation; Mastectomy; Cystoscopy; Cataract removal; Ankle surgery; other surgical history (9/27/2011); ECHO Compl W Dop Color Flow (2/14/2013); Echo Complete (9/17/2013); spinal cord decompression; other surgical history (insertion lumbar drain insertion); other surgical history (10/22/15); other surgical history (11/03/2015); Tunneled venous catheter placement (11/15/2017); Dialysis fistula creation (Left, 01/31/2018); vitrectomy (Left, 04/10/2018); pr rpr retinal dtchmnt w/vitrectomy any meth (Left, 4/10/2018); pr av anast,up arm basilic vein transposit (Left, 5/15/2018); pr ligatn angioaccess av fistula (Left, 9/25/2018); Colonoscopy; and Carpal tunnel release (Left, 3/17/2020). Social History:  reports that she has never smoked. She has never used smokeless tobacco. She reports that she does not drink alcohol and does not use drugs. Family History: family history includes Breast Cancer (age of onset: 61) in her maternal grandmother and mother; Heart Disease in her father; Hypertension in her mother; Prostate Cancer in her father. . Unless otherwise noted, family history is non results for this patient. Results are listed below.      LABS: (Lab results interpreted by me)  Results for orders placed or performed during the hospital encounter of 05/19/21   Respiratory Panel, Molecular, with COVID-19 (Restricted: peds pts or suitable admitted adults)    Specimen: Nasopharyngeal   Result Value Ref Range    Adenovirus by PCR Not Detected Not Detected    Bordetella parapertussis by PCR Not Detected Not Detected    Bordetella pertussis by PCR Not Detected Not Detected    Chlamydophilia pneumoniae by PCR Not Detected Not Detected    Coronavirus 229E by PCR Not Detected Not Detected    Coronavirus HKU1 by PCR Not Detected Not Detected    Coronavirus NL63 by PCR Not Detected Not Detected    Coronavirus OC43 by PCR Not Detected Not Detected    SARS-CoV-2, PCR Not Detected Not Detected    Human Metapneumovirus by PCR Not Detected Not Detected    Human Rhinovirus/Enterovirus by PCR Not Detected Not Detected    Influenza A by PCR Not Detected Not Detected    Influenza B by PCR Not Detected Not Detected    Mycoplasma pneumoniae by PCR Not Detected Not Detected    Parainfluenza Virus 1 by PCR Not Detected Not Detected    Parainfluenza Virus 2 by PCR Not Detected Not Detected    Parainfluenza Virus 3 by PCR Not Detected Not Detected    Parainfluenza Virus 4 by PCR Not Detected Not Detected    Respiratory Syncytial Virus by PCR Not Detected Not Detected   CBC auto differential   Result Value Ref Range    WBC 4.3 (L) 4.5 - 11.5 E9/L    RBC 3.71 3.50 - 5.50 E12/L    Hemoglobin 10.2 (L) 11.5 - 15.5 g/dL    Hematocrit 34.2 34.0 - 48.0 %    MCV 92.2 80.0 - 99.9 fL    MCH 27.5 26.0 - 35.0 pg    MCHC 29.8 (L) 32.0 - 34.5 %    RDW 16.9 (H) 11.5 - 15.0 fL    Platelets 034 196 - 377 E9/L    MPV 10.7 7.0 - 12.0 fL    Neutrophils % 51.1 43.0 - 80.0 %    Immature Granulocytes % 3.2 0.0 - 5.0 %    Lymphocytes % 34.1 20.0 - 42.0 %    Monocytes % 8.1 2.0 - 12.0 %    Eosinophils % 2.8 0.0 - 6.0 %    Basophils % 0.7 0.0 - 2.0 %    Neutrophils Absolute 2.20 1.80 - 7.30 E9/L    Immature Granulocytes # 0.14 E9/L    Lymphocytes Absolute 1.47 (L) 1.50 - 4.00 E9/L    Monocytes Absolute 0.35 0.10 - 0.95 E9/L    Eosinophils Absolute 0.12 0.05 - 0.50 E9/L    Basophils Absolute 0.03 0.00 - 0.20 E9/L   Comprehensive Metabolic Panel w/ Reflex to MG   Result Value Ref Range    Sodium 140 132 - 146 mmol/L    Potassium reflex Magnesium 3.9 3.5 - 5.0 mmol/L    Chloride 95 (L) 98 - 107 mmol/L    CO2 28 22 - 29 mmol/L    Anion Gap 17 (H) 7 - 16 mmol/L    Glucose 171 (H) 74 - 99 mg/dL    BUN 21 6 - 23 mg/dL    CREATININE 3.9 (H) 0.5 - 1.0 mg/dL    GFR Non-African American 14 >=60 mL/min/1.73    GFR African American 14     Calcium 8.8 8.6 - 10.2 mg/dL    Total Protein 6.9 6.4 - 8.3 g/dL    Albumin 4.2 3.5 - 5.2 g/dL    Total Bilirubin 0.5 0.0 - 1.2 mg/dL    Alkaline Phosphatase 133 (H) 35 - 104 U/L    ALT 98 (H) 0 - 32 U/L     (H) 0 - 31 U/L   Troponin   Result Value Ref Range    Troponin 0.87 (H) 0.00 - 0.03 ng/mL   Brain Natriuretic Peptide   Result Value Ref Range    Pro-BNP >70,000 (H) 0 - 125 pg/mL   Protime-INR   Result Value Ref Range    Protime 11.5 9.3 - 12.4 sec    INR 1.1    APTT   Result Value Ref Range    aPTT 36.5 (H) 24.5 - 35.1 sec   Blood Gas, Arterial   Result Value Ref Range    Date Analyzed [id number]     Time Analyzed 1623     Source: Blood Arterial     pH, Blood Gas 7.415 7.350 - 7.450    PCO2 39.4 35.0 - 45.0 mmHg    PO2 63.0 (L) 75.0 - 100.0 mmHg    HCO3 24.7 22.0 - 26.0 mmol/L    B.E. 0.2 -3.0 - 3.0 mmol/L    O2 Sat 92.0 92.0 - 98.5 %    O2Hb 90.6 (L) 94.0 - 97.0 %    COHb 1.2 0.0 - 1.5 %    MetHb 0.3 0.0 - 1.5 %    O2 Content 14.3 mL/dL    HHb 7.9 (H) 0.0 - 5.0 %    tHb (est) 11.2 (L) 11.5 - 16.5 g/dL    Mode RA     Date Of Collection      Time Collected      Pt Temp 37.0 C     ID [id number]     Lab [id number]     Critical(s) Notified .  No Critical Values    TSH without Reflex   Result Value Ref Range    TSH 2.810 0.270 - 4.200 uIU/mL   T4, Free   Result Value Ref Range    T4 Free 1.58 0.93 - 1.70 ng/dL   Lipid panel   Result Value Ref Range    Cholesterol, Total 101 0 - 199 mg/dL    Triglycerides 106 0 - 149 mg/dL    HDL 38 >40 mg/dL    LDL Calculated 42 0 - 99 mg/dL    VLDL Cholesterol Calculated 21 mg/dL   Hemoglobin A1c   Result Value Ref Range    Hemoglobin A1C 6.6 (H) 4.0 - 5.6 %   CK   Result Value Ref Range    Total CK 57 20 - 180 U/L   CK-MB   Result Value Ref Range    CK-MB 2.7 0.0 - 4.3 ng/mL   Magnesium   Result Value Ref Range    Magnesium 2.4 1.6 - 2.6 mg/dL   CBC Auto Differential   Result Value Ref Range    WBC 5.6 4.5 - 11.5 E9/L    RBC 3.51 3.50 - 5.50 E12/L    Hemoglobin 10.2 (L) 11.5 - 15.5 g/dL    Hematocrit 32.3 (L) 34.0 - 48.0 %    MCV 92.0 80.0 - 99.9 fL    MCH 29.1 26.0 - 35.0 pg    MCHC 31.6 (L) 32.0 - 34.5 %    RDW 16.9 (H) 11.5 - 15.0 fL    Platelets 657 817 - 104 E9/L    MPV 10.2 7.0 - 12.0 fL    Neutrophils % 71.2 43.0 - 80.0 %    Immature Granulocytes % 0.4 0.0 - 5.0 %    Lymphocytes % 17.6 (L) 20.0 - 42.0 %    Monocytes % 9.8 2.0 - 12.0 %    Eosinophils % 0.5 0.0 - 6.0 %    Basophils % 0.5 0.0 - 2.0 %    Neutrophils Absolute 4.02 1.80 - 7.30 E9/L    Immature Granulocytes # 0.02 E9/L    Lymphocytes Absolute 0.99 (L) 1.50 - 4.00 E9/L    Monocytes Absolute 0.55 0.10 - 0.95 E9/L    Eosinophils Absolute 0.03 (L) 0.05 - 0.50 E9/L    Basophils Absolute 0.03 0.00 - 0.20 E9/L   CBC Auto Differential   Result Value Ref Range    WBC 5.5 4.5 - 11.5 E9/L    RBC 3.29 (L) 3.50 - 5.50 E12/L    Hemoglobin 9.5 (L) 11.5 - 15.5 g/dL    Hematocrit 29.9 (L) 34.0 - 48.0 %    MCV 90.9 80.0 - 99.9 fL    MCH 28.9 26.0 - 35.0 pg    MCHC 31.8 (L) 32.0 - 34.5 %    RDW 16.5 (H) 11.5 - 15.0 fL    Platelets 095 946 - 944 E9/L    MPV 11.0 7.0 - 12.0 fL    Neutrophils % 71.3 43.0 - 80.0 %    Immature Granulocytes % 0.5 0.0 - 5.0 %    Lymphocytes % 16.5 (L) 20.0 - 42.0 %    Monocytes % 10.2 2.0 - 12.0 %    Eosinophils % 1.1 0.0 - 6.0 % Basophils % 0.4 0.0 - 2.0 %    Neutrophils Absolute 3.92 1.80 - 7.30 E9/L    Immature Granulocytes # 0.03 E9/L    Lymphocytes Absolute 0.91 (L) 1.50 - 4.00 E9/L    Monocytes Absolute 0.56 0.10 - 0.95 E9/L    Eosinophils Absolute 0.06 0.05 - 0.50 E9/L    Basophils Absolute 0.02 0.00 - 0.20 O7/K   Basic metabolic panel   Result Value Ref Range    Sodium 138 132 - 146 mmol/L    Potassium 4.2 3.5 - 5.0 mmol/L    Chloride 96 (L) 98 - 107 mmol/L    CO2 27 22 - 29 mmol/L    Anion Gap 15 7 - 16 mmol/L    Glucose 166 (H) 74 - 99 mg/dL    BUN 24 (H) 6 - 23 mg/dL    CREATININE 4.6 (H) 0.5 - 1.0 mg/dL    GFR Non-African American 12 >=60 mL/min/1.73    GFR African American 12     Calcium 8.9 8.6 - 10.2 mg/dL   Magnesium   Result Value Ref Range    Magnesium 2.3 1.6 - 2.6 mg/dL   Phosphorus   Result Value Ref Range    Phosphorus 4.2 2.5 - 4.5 mg/dL   Hepatic function panel   Result Value Ref Range    Total Protein 6.8 6.4 - 8.3 g/dL    Albumin 4.0 3.5 - 5.2 g/dL    Alkaline Phosphatase 136 (H) 35 - 104 U/L     (H) 0 - 32 U/L     (H) 0 - 31 U/L    Total Bilirubin 0.6 0.0 - 1.2 mg/dL    Bilirubin, Direct <0.2 0.0 - 0.3 mg/dL    Bilirubin, Indirect see below 0.0 - 1.0 mg/dL   Troponin   Result Value Ref Range    Troponin 0.82 (H) 0.00 - 0.03 ng/mL   Troponin   Result Value Ref Range    Troponin 0.81 (H) 0.00 - 0.03 ng/mL   CK   Result Value Ref Range    Total CK 67 20 - 180 U/L   CK   Result Value Ref Range    Total CK 63 20 - 180 U/L   CK-MB   Result Value Ref Range    CK-MB 4.3 0.0 - 4.3 ng/mL   CK-MB   Result Value Ref Range    CK-MB 3.5 0.0 - 4.3 ng/mL   HEMOGLOBIN AND HEMATOCRIT, BLOOD   Result Value Ref Range    Hemoglobin 9.3 (L) 11.5 - 15.5 g/dL    Hematocrit 30.8 (L) 34.0 - 48.0 %   APTT   Result Value Ref Range    aPTT 75.0 (H) 24.5 - 35.1 sec   APTT   Result Value Ref Range    aPTT 72.8 (H) 24.5 - 35.1 sec   POCT Glucose   Result Value Ref Range    Meter Glucose 159 (H) 74 - 99 mg/dL   POCT Venous   Result Value Ref Range    POC Sodium 140 132 - 146 mmol/L    POC Potassium 4.0 3.5 - 5.0 mmol/L    POC Chloride 105 100 - 108 mmol/L    POC Glucose 182 (H) 74 - 99 mg/dl    POC Creatinine 4.0 (H) 0.5 - 1.0 mg/dL    GFR Non-African American 11 >=60 mL/min/1.73    GFR  14     Performed on SEE BELOW    EKG 12 Lead   Result Value Ref Range    Ventricular Rate 66 BPM    Atrial Rate 66 BPM    P-R Interval 194 ms    QRS Duration 112 ms    Q-T Interval 434 ms    QTc Calculation (Bazett) 454 ms    P Axis 49 degrees    R Axis 91 degrees    T Axis 103 degrees   ,       RADIOLOGY:  Interpreted by Radiologist unless otherwise specified  XR CHEST PORTABLE   Final Result   Cardiomegaly with pulmonary vascular congestive changes. Left-sided internal jugular line tip terminates in the region of the left   aortic arch and should be advanced. Stable positioning of right sided port a catheter tip in the SVC. XR CHEST PORTABLE   Final Result   No acute process. Cardiomegaly. CTA HEAD W CONTRAST   Final Result   1. Mild atherosclerotic disease . No large vessel occlusion   identified   2. Estimated stenosis of the proximal right and left internal carotid   artery by NASCET criteria is not hemodynamically significant         This study was analyzed by the [employer]. ai algorithm. CTA NECK W CONTRAST   Final Result   1. Mild atherosclerotic disease . No large vessel occlusion   identified   2. Estimated stenosis of the proximal right and left internal carotid   artery by NASCET criteria is not hemodynamically significant         This study was analyzed by the [employer]. ai algorithm. CT BRAIN PERFUSION   Final Result      No significant ischemic penumbra identified      This study was analyzed by the [employer]. ai algorithm. CT HEAD WO CONTRAST   Final Result   No acute intracranial abnormality.                EKG Interpretation  Interpreted by Sherly Resendiz, dextrose 50 % IV solution (has no administration in time range)   glucagon (rDNA) injection 1 mg (has no administration in time range)   dextrose 5 % solution (has no administration in time range)   pantoprazole (PROTONIX) injection 40 mg (has no administration in time range)     And   sodium chloride (PF) 0.9 % injection 10 mL (has no administration in time range)   iopamidol (ISOVUE-370) 76 % injection 100 mL (100 mLs Intravenous Given 5/19/21 1326)   ketamine (KETALAR) injection 20 mg (40 mg Intravenous Given 5/19/21 1310)   heparin (porcine) injection 4,000 Units (4,000 Units Intravenous Given 5/19/21 1504)   lidocaine (cardiac) (XYLOCAINE) injection 100 mg (100 mg Intravenous Given 5/19/21 1620)   aspirin chewable tablet 324 mg (324 mg Oral Given 5/19/21 1918)   pantoprazole (PROTONIX) injection 40 mg (40 mg Intravenous Given 5/20/21 0305)           The cardiac monitor revealed NSR with a heart rate in the 60s as interpreted by me. The cardiac monitor was ordered secondary to the patient's altered mental status and to monitor the patient for dysrhythmia. CPT [id number]           Medical Decision Making:     The patient was seen and evaluated by the Attending Emergency Medicine Physician Dr. Boom Reinoso. The patient is a 81-year-old female presents to the emergency department for altered mental status. She is hemodynamically stable on arrival, ill in appearance, and mildly distressed. Point-of-care blood sugar was within normal limits. Did obtain EKG that did not show any evidence of acute ST elevations. ABG did not show any significant acidosis or hypercapnia. She was not moving her right arm initially and a stroke alert was called. CT scans did not show any evidence of hemorrhage or LVO. Patient was sedated with Ativan and ketamine for the CAT scans as she was very agitated on arrival.  She does have CKD with a creatinine of 3.9.   However, troponin was 0.87 and even with her elevated creatinine when reviewing or labs her troponin has never been this elevated. Is over 70,000, this may also be due to her poor renal function. Chest x-ray within normal limits. Cardiology was consulted and the nurse practitioner came to the emergency department to evaluate the patient. Consulted with medicine who accepted patient for admission. While awaiting a bed the patient did have an episode of slow ventricular tachycardia. She was treated with lidocaine and started on a lidocaine drip. Patient was also started on heparin for NSTEMI. Consulted with ICU who accepted for admission. Patient became alert and oriented x3 and was protecting her airway and moving all extremities. GCS improved from 10-15 prior to admission. She did regain movement of the right arm. Discussed results and plan in depth with patient's brother at bedside. He verbalized understanding and agreement to treatment plan and admission. Re-Evaluations:  ED Course as of May 20 0611   Wed May 19, 2021   1622 Consulted with Dr. Pedro Luis Lynne and Dr. Laura Mcbride, house med, who accepted for admission. [KG]      ED Course User Index  [KG] Hansel Betts DO       This patient's ED course included: a personal history and physicial examination, re-evaluation prior to disposition, multiple bedside re-evaluations, IV medications, cardiac monitoring, continuous pulse oximetry and complex medical decision making and emergency management    This patient has remained hemodynamically stable during their ED course. Consultations:  Spoke with Dr. Laura Mcbride (Medicine). Discussed case. They will admit this patient. Spoke with Dr. Pedro Luis Lynne (ICU). Discussed case. They will admit this patient. Spoke with Favian Hanson (Cardiology). Discussed case. They will provide consultation. Counseling:    The emergency provider has spoken with the patient and discussed todays results, in addition to providing specific details for the plan of care and counseling regarding the diagnosis and prognosis. Questions are answered at this time and they are agreeable with the plan.       --------------------------------- IMPRESSION AND DISPOSITION ---------------------------------    IMPRESSION  1. Cardiac arrest ([facility]    2. NSTEM[facility] (non-ST elevated myocardial infarction) [facility]    3. Ventricular tachycardia ([facility])    4. Stage 5 chronic kidney disease on dialysis ([facility]    5. Patient is Druze        DISPOSITION  Disposition: Admit to CCU/ICU  Patient condition is serious        NOTE: This report was transcribed using voice recognition software. Every effort was made to ensure accuracy; however, inadvertent computerized transcription errors may be present    Please note that the withdrawal or failure to initiate urgent interventions for this patient would likely result in a life threatening deterioration or permanent disability. Systems at risk for deterioration include: cardiovascular    Accordingly this patient received 30 minutes of critical care time, excluding separately billable procedures.           Dione Soni DO  Resident  05/20/21 [id number]

## 2021-05-20 ENCOUNTER — APPOINTMENT (OUTPATIENT)
Dept: NUCLEAR MEDICINE | Age: 66
DRG: 246 | End: 2021-05-20
Payer: COMMERCIAL

## 2021-05-20 LAB
ALBUMIN SERPL-MCNC: 3.6 G/DL (ref 3.5–5.2)
ALP BLD-CCNC: 117 U/L (ref 35–104)
ALT SERPL-CCNC: 99 U/L (ref 0–32)
ANION GAP SERPL CALCULATED.3IONS-SCNC: 14 MMOL/L (ref 7–16)
APTT: 72.8 SEC (ref 24.5–35.1)
APTT: 75 SEC (ref 24.5–35.1)
APTT: 76.8 SEC (ref 24.5–35.1)
AST SERPL-CCNC: 67 U/L (ref 0–31)
BASOPHILS ABSOLUTE: 0.02 E9/L (ref 0–0.2)
BASOPHILS RELATIVE PERCENT: 0.4 % (ref 0–2)
BILIRUB SERPL-MCNC: 0.5 MG/DL (ref 0–1.2)
BILIRUBIN DIRECT: 0.2 MG/DL (ref 0–0.3)
BILIRUBIN, INDIRECT: 0.3 MG/DL (ref 0–1)
BUN BLDV-MCNC: 28 MG/DL (ref 6–23)
CALCIUM SERPL-MCNC: 8.8 MG/DL (ref 8.6–10.2)
CHLORIDE BLD-SCNC: 98 MMOL/L (ref 98–107)
CK MB: 2.7 NG/ML (ref 0–4.3)
CK MB: 3.5 NG/ML (ref 0–4.3)
CO2: 26 MMOL/L (ref 22–29)
CREAT SERPL-MCNC: 5 MG/DL (ref 0.5–1)
EOSINOPHILS ABSOLUTE: 0.06 E9/L (ref 0.05–0.5)
EOSINOPHILS RELATIVE PERCENT: 1.1 % (ref 0–6)
GFR AFRICAN AMERICAN: 10
GFR NON-AFRICAN AMERICAN: 10 ML/MIN/1.73
GLUCOSE BLD-MCNC: 109 MG/DL (ref 74–99)
HCT VFR BLD CALC: 29.9 % (ref 34–48)
HCT VFR BLD CALC: 30.8 % (ref 34–48)
HEMOGLOBIN: 9.3 G/DL (ref 11.5–15.5)
HEMOGLOBIN: 9.5 G/DL (ref 11.5–15.5)
IMMATURE GRANULOCYTES #: 0.03 E9/L
IMMATURE GRANULOCYTES %: 0.5 % (ref 0–5)
LV EF: 25 %
LVEF MODALITY: NORMAL
LYMPHOCYTES ABSOLUTE: 0.91 E9/L (ref 1.5–4)
LYMPHOCYTES RELATIVE PERCENT: 16.5 % (ref 20–42)
MAGNESIUM: 2.4 MG/DL (ref 1.6–2.6)
MCH RBC QN AUTO: 28.9 PG (ref 26–35)
MCHC RBC AUTO-ENTMCNC: 31.8 % (ref 32–34.5)
MCV RBC AUTO: 90.9 FL (ref 80–99.9)
METER GLUCOSE: 94 MG/DL (ref 74–99)
METER GLUCOSE: 96 MG/DL (ref 74–99)
METER GLUCOSE: 98 MG/DL (ref 74–99)
MONOCYTES ABSOLUTE: 0.56 E9/L (ref 0.1–0.95)
MONOCYTES RELATIVE PERCENT: 10.2 % (ref 2–12)
NEUTROPHILS ABSOLUTE: 3.92 E9/L (ref 1.8–7.3)
NEUTROPHILS RELATIVE PERCENT: 71.3 % (ref 43–80)
PDW BLD-RTO: 16.5 FL (ref 11.5–15)
PHOSPHORUS: 4.5 MG/DL (ref 2.5–4.5)
PLATELET # BLD: 178 E9/L (ref 130–450)
PMV BLD AUTO: 11 FL (ref 7–12)
POTASSIUM SERPL-SCNC: 4.6 MMOL/L (ref 3.5–5)
RBC # BLD: 3.29 E12/L (ref 3.5–5.5)
SODIUM BLD-SCNC: 138 MMOL/L (ref 132–146)
TOTAL CK: 58 U/L (ref 20–180)
TOTAL CK: 63 U/L (ref 20–180)
TOTAL PROTEIN: 6 G/DL (ref 6.4–8.3)
TROPONIN: 0.81 NG/ML (ref 0–0.03)
TROPONIN: 0.83 NG/ML (ref 0–0.03)
WBC # BLD: 5.5 E9/L (ref 4.5–11.5)

## 2021-05-20 PROCEDURE — 82962 GLUCOSE BLOOD TEST: CPT

## 2021-05-20 PROCEDURE — C9113 INJ PANTOPRAZOLE SODIUM, VIA: HCPCS | Performed by: INTERNAL MEDICINE

## 2021-05-20 PROCEDURE — 80076 HEPATIC FUNCTION PANEL: CPT

## 2021-05-20 PROCEDURE — 6370000000 HC RX 637 (ALT 250 FOR IP): Performed by: INTERNAL MEDICINE

## 2021-05-20 PROCEDURE — 93017 CV STRESS TEST TRACING ONLY: CPT

## 2021-05-20 PROCEDURE — 85018 HEMOGLOBIN: CPT

## 2021-05-20 PROCEDURE — 84484 ASSAY OF TROPONIN QUANT: CPT

## 2021-05-20 PROCEDURE — 36620 INSERTION CATHETER ARTERY: CPT

## 2021-05-20 PROCEDURE — 90935 HEMODIALYSIS ONE EVALUATION: CPT

## 2021-05-20 PROCEDURE — 99223 1ST HOSP IP/OBS HIGH 75: CPT | Performed by: INTERNAL MEDICINE

## 2021-05-20 PROCEDURE — 6360000002 HC RX W HCPCS: Performed by: STUDENT IN AN ORGANIZED HEALTH CARE EDUCATION/TRAINING PROGRAM

## 2021-05-20 PROCEDURE — A9500 TC99M SESTAMIBI: HCPCS | Performed by: RADIOLOGY

## 2021-05-20 PROCEDURE — 85730 THROMBOPLASTIN TIME PARTIAL: CPT

## 2021-05-20 PROCEDURE — 93018 CV STRESS TEST I&R ONLY: CPT | Performed by: INTERNAL MEDICINE

## 2021-05-20 PROCEDURE — 37799 UNLISTED PX VASCULAR SURGERY: CPT

## 2021-05-20 PROCEDURE — 6360000002 HC RX W HCPCS: Performed by: NURSE PRACTITIONER

## 2021-05-20 PROCEDURE — 3430000000 HC RX DIAGNOSTIC RADIOPHARMACEUTICAL: Performed by: RADIOLOGY

## 2021-05-20 PROCEDURE — 93016 CV STRESS TEST SUPVJ ONLY: CPT | Performed by: INTERNAL MEDICINE

## 2021-05-20 PROCEDURE — 83735 ASSAY OF MAGNESIUM: CPT

## 2021-05-20 PROCEDURE — 80048 BASIC METABOLIC PNL TOTAL CA: CPT

## 2021-05-20 PROCEDURE — 99221 1ST HOSP IP/OBS SF/LOW 40: CPT | Performed by: PSYCHIATRY & NEUROLOGY

## 2021-05-20 PROCEDURE — 82553 CREATINE MB FRACTION: CPT

## 2021-05-20 PROCEDURE — 84100 ASSAY OF PHOSPHORUS: CPT

## 2021-05-20 PROCEDURE — 85025 COMPLETE CBC W/AUTO DIFF WBC: CPT

## 2021-05-20 PROCEDURE — 78452 HT MUSCLE IMAGE SPECT MULT: CPT | Performed by: INTERNAL MEDICINE

## 2021-05-20 PROCEDURE — 99233 SBSQ HOSP IP/OBS HIGH 50: CPT | Performed by: INTERNAL MEDICINE

## 2021-05-20 PROCEDURE — 2000000000 HC ICU R&B

## 2021-05-20 PROCEDURE — 2580000003 HC RX 258: Performed by: INTERNAL MEDICINE

## 2021-05-20 PROCEDURE — 6360000002 HC RX W HCPCS: Performed by: INTERNAL MEDICINE

## 2021-05-20 PROCEDURE — 2700000000 HC OXYGEN THERAPY PER DAY

## 2021-05-20 PROCEDURE — 82550 ASSAY OF CK (CPK): CPT

## 2021-05-20 PROCEDURE — 85014 HEMATOCRIT: CPT

## 2021-05-20 PROCEDURE — 78452 HT MUSCLE IMAGE SPECT MULT: CPT

## 2021-05-20 RX ORDER — ACETAMINOPHEN 325 MG/1
650 TABLET ORAL EVERY 6 HOURS PRN
Status: DISCONTINUED | OUTPATIENT
Start: 2021-05-20 | End: 2021-05-25 | Stop reason: HOSPADM

## 2021-05-20 RX ORDER — NITROGLYCERIN 0.4 MG/1
0.4 TABLET SUBLINGUAL EVERY 5 MIN PRN
Status: COMPLETED | OUTPATIENT
Start: 2021-05-20 | End: 2021-05-20

## 2021-05-20 RX ADMIN — NITROGLYCERIN 0.4 MG: 0.4 TABLET, ORALLY DISINTEGRATING SUBLINGUAL at 21:19

## 2021-05-20 RX ADMIN — NITROGLYCERIN 1 INCH: 20 OINTMENT TOPICAL at 17:32

## 2021-05-20 RX ADMIN — ATORVASTATIN CALCIUM 40 MG: 40 TABLET, FILM COATED ORAL at 21:14

## 2021-05-20 RX ADMIN — NITROGLYCERIN 1 INCH: 20 OINTMENT TOPICAL at 02:57

## 2021-05-20 RX ADMIN — METOPROLOL TARTRATE 25 MG: 25 TABLET, FILM COATED ORAL at 10:12

## 2021-05-20 RX ADMIN — Medication 10 MILLICURIE: at 10:44

## 2021-05-20 RX ADMIN — ASPIRIN 81 MG: 81 TABLET, COATED ORAL at 09:26

## 2021-05-20 RX ADMIN — NITROGLYCERIN 1 INCH: 20 OINTMENT TOPICAL at 23:51

## 2021-05-20 RX ADMIN — REGADENOSON 0.4 MG: 0.08 INJECTION, SOLUTION INTRAVENOUS at 12:12

## 2021-05-20 RX ADMIN — PANTOPRAZOLE SODIUM 40 MG: 40 INJECTION, POWDER, FOR SOLUTION INTRAVENOUS at 03:05

## 2021-05-20 RX ADMIN — NITROGLYCERIN 0.4 MG: 0.4 TABLET, ORALLY DISINTEGRATING SUBLINGUAL at 21:24

## 2021-05-20 RX ADMIN — SODIUM CHLORIDE, PRESERVATIVE FREE 10 ML: 5 INJECTION INTRAVENOUS at 09:25

## 2021-05-20 RX ADMIN — PANTOPRAZOLE SODIUM 40 MG: 40 INJECTION, POWDER, FOR SOLUTION INTRAVENOUS at 09:25

## 2021-05-20 RX ADMIN — ACETAMINOPHEN 650 MG: 325 TABLET ORAL at 21:22

## 2021-05-20 RX ADMIN — HEPARIN SODIUM 11.5 UNITS/KG/HR: 10000 INJECTION, SOLUTION INTRAVENOUS at 14:45

## 2021-05-20 RX ADMIN — METOPROLOL TARTRATE 25 MG: 25 TABLET, FILM COATED ORAL at 21:14

## 2021-05-20 RX ADMIN — NITROGLYCERIN 0.4 MG: 0.4 TABLET, ORALLY DISINTEGRATING SUBLINGUAL at 20:17

## 2021-05-20 RX ADMIN — Medication 35 MILLICURIE: at 12:04

## 2021-05-20 ASSESSMENT — PAIN DESCRIPTION - FREQUENCY
FREQUENCY: CONTINUOUS
FREQUENCY: CONTINUOUS

## 2021-05-20 ASSESSMENT — PAIN DESCRIPTION - ONSET
ONSET: ON-GOING
ONSET: ON-GOING

## 2021-05-20 ASSESSMENT — PAIN - FUNCTIONAL ASSESSMENT
PAIN_FUNCTIONAL_ASSESSMENT: PREVENTS OR INTERFERES SOME ACTIVE ACTIVITIES AND ADLS
PAIN_FUNCTIONAL_ASSESSMENT: PREVENTS OR INTERFERES SOME ACTIVE ACTIVITIES AND ADLS
PAIN_FUNCTIONAL_ASSESSMENT: ACTIVITIES ARE NOT PREVENTED
PAIN_FUNCTIONAL_ASSESSMENT: PREVENTS OR INTERFERES SOME ACTIVE ACTIVITIES AND ADLS
PAIN_FUNCTIONAL_ASSESSMENT: PREVENTS OR INTERFERES SOME ACTIVE ACTIVITIES AND ADLS

## 2021-05-20 ASSESSMENT — PAIN DESCRIPTION - ORIENTATION
ORIENTATION: LEFT;MID
ORIENTATION: LEFT;MID
ORIENTATION: RIGHT;LEFT

## 2021-05-20 ASSESSMENT — PAIN DESCRIPTION - LOCATION
LOCATION: LEG
LOCATION: CHEST
LOCATION: CHEST

## 2021-05-20 ASSESSMENT — PAIN SCALES - GENERAL
PAINLEVEL_OUTOF10: 3
PAINLEVEL_OUTOF10: 0
PAINLEVEL_OUTOF10: 6

## 2021-05-20 ASSESSMENT — PAIN DESCRIPTION - PROGRESSION
CLINICAL_PROGRESSION: NOT CHANGED

## 2021-05-20 ASSESSMENT — PAIN DESCRIPTION - PAIN TYPE
TYPE: ACUTE PAIN

## 2021-05-20 ASSESSMENT — PAIN DESCRIPTION - DESCRIPTORS
DESCRIPTORS: DISCOMFORT;NAGGING
DESCRIPTORS: ACHING
DESCRIPTORS: DISCOMFORT;NAGGING

## 2021-05-20 NOTE — PROGRESS NOTES
Pt down for cardiac testing on multiple attempts to see pt. Case reviewed and discussed with bedside nurse.   Recommend MRI brain

## 2021-05-20 NOTE — PROCEDURES
Central Line Insertion     Procedure: left femoral vein Triple Lumen Catheter placement. Indications: vascular access and poor peripheral access    Consent: Unable to be obtained due to the emergent nature of this procedure. Number of sticks: 1    Number of Kits used: 1    Procedure: Time Out: Immediately prior to the procedure a \"timeout\" was called to verify the correct patient and procedure. The patient was place in the trendelenburg position and the skin over the left femoral vein was prepped with betadine and draped in a sterile fashion. Local anesthesia was obtained by infiltration using 1% Lidocaine without epinephrine. With Ultrasound guidance a large bore needle was used to identify the vein, dark non pulsatile blood returned. The guide wire was then inserted through the needle with minimal resistance. 2 mm nick was made in the skin beside the guidewire. Then a dilator was inserted and removed. A triple lumen catheter was then inserted into the vessel over the guide wire using the Seldinger technique. All ports showed good, free flowing blood return and were flushed with saline solution. The catheter was then securely fastened to the skin with sutures and covered with a bio patch and sterile dressing. Complications: None   The patient tolerated the procedure well. Estimated blood loss: 5 ml.     Shaheed Ellis MD PGY-2  5/19/2021  10:40 PM

## 2021-05-20 NOTE — H&P
[facility]  Internal Medicine Residency Program  History and Physical    Patient:  Early Kaylene 73 y.o. female MRN: [id number]     Date of Service: 5/19/2021    Hospital Day: 1      Chief complaint: had concerns including Altered Mental Status (ams at dialysis, went unresponsive and lost pulses, 1 shock given at dailysis ). History Obtained From:  patient, electronic medical record    Date of Admission: 5/19/21  History of Present Illness   Julio C Maurice is a 73 y.o. female who is a Qu[employer] with PMHx of HTN,HLD, T2DM with retinopathy/neuropathy , Rt breast CA s/p mastectomy and [id number](7271) complicated by chronic RUE chronic lymphedema, cath in 2011/2012 (LCX 90% stenosis-medical mgmt), Stage II diastolic dysfunction on Echo with LVH,spinal cord disease s/p spinal implant,  ESRD on HD presented with CC of Vtach/Vfib arrest during dialysis    She was complaining of some intermittent chest pain to her sister last week. However, she denied any pain this morning. Of note, she also had bledeing from her AVfistula last week in dialysis session. This morning she was in her dialysis session when FDC through the session , she became unresponsive and lost pulses. A Code blue was called and cardiac monitor read it as a shockable rhythm. She was shocked with a defibrillator x 1 time. ROSC was achieved. No CPR or meds were given . She was then brought to ED. She began to regain consciousness and chuck confused. However, as per the chart she had RUE weakness. A stroke alert was then called . Imaging was done which was negative. NIHS score at the time went down from 7 to 4 . Shortly after her scans, she went into another episode fo Vtach in the ED for which she was put on a lidocaine drip as per cardiology who was evaluating her at the time. By this time, the house team came down to examine her. She was stable with HR 65 bpm, /73mm Hg, afebrile breathing 66 RR.  She was alert, oriented x3 and answered all questions. However we did note she had would forget everything after 5 mintues and repeat things and has slurred speech that is new as per her family member at bedside. She was then admitted to ICU for further management . Past Medical History:       Diagnosis Date    Acute infection of bone ([address]     infection of rt foot, resolved.     Anemia of chronic disease     Breast cancer ([address]     right breast, 2008/ bladder, 2006- last chemotherapy \"years ago\"    CAD (coronary artery disease)     Carpal tunnel syndrome     bilat - for OR left hand 3-17-20     Chronic diastolic CHF (congestive heart failure) ([address] 09/23/2014 9/23/14- echocardiogram revealed moderate LV concentric hypertrophy, stage III diastolic dysfunction, mild tricuspid regurgitation    CKD (chronic kidney disease) stage 4, GFR 15-29 ml/min ([facility])     Diabetic retinopathy ([address]     Glaucoma     Hemodialysis patient ([address]     [facility]- Dr. Alonso Calderon - left arm fistula     Hyperkalemia, diminished renal excretion 11/9/2017    Hypertension     Hypoglycemia unawareness in type 1 diabetes mellitus ([address] 11/7/2017    Insulin dependent type 2 diabetes mellitus ([address]     Neuropathy     feet    Osteomyelitis due to secondary diabetes ([address]     rt great toe with amputation    Patient is Yazidism 11/7/2017    Refusal of blood product     patient states she dose not take blood transfusion    Ventricular hypertrophy     Vitreous hemorrhage ([address]     left eye       Past Surgical History:        Procedure Laterality Date    AMPUTATION      right great toe    ANKLE SURGERY      correction on charcot joint of right ankle    CARPAL TUNNEL RELEASE Left 3/17/2020    LEFT CARPAL TUNNEL RELEASE performed by Meghna Pugh MD at [facility]      bilateral    CHOLECYSTECTOMY      COLONOSCOPY      CYSTOSCOPY      DIALYSIS FISTULA CREATION Left 01/31/2018    upper arm/Dr. Ari Hart  ECHO COMPL W DOP COLOR FLOW  2/14/2013         ECHO COMPLETE  9/17/2013         MASTECTOMY      right    OTHER SURGICAL HISTORY  9/27/2011    PPV, membranectomy, laser Right eye    OTHER SURGICAL HISTORY  insertion lumbar drain insertion    10/12/`14    OTHER SURGICAL HISTORY  10/22/15    percutaneous lead placement for spinal cord stimulator    OTHER SURGICAL HISTORY  11/03/2015    Spinal; cord stimulator- turned off as of 3-10-20     MA AV ANAST,UP ARM BASILIC VEIN TRANSPOSIT Left 5/15/2018    TRANSPOSITION STAGE II AV FISTULA - LEFT UPPER ARM performed by Jodi Das MD at [facility] ANGIOACCESS AV FISTULA Left 9/25/2018    SUPERFICIALIZATION AV FISTULA - LEFT ARM performed by Jodi Das MD at [facility] W/VITRECTOMY ANY METH Left 4/10/2018    PARS PLANA VITRECTOMY 25 GAUGE RETINAL DETACHMENT REPAIR air fluid exchange, endolaser performed by Hilton Avila MD at [address] [place] [address]  11/15/2017    VITRECTOMY Left 04/10/2018    PARS PLANA VITRECTOMY; RETINAL DETACHMENT REPAIR; GAS BUBBLE; LASER LEFT EYE       Medications Prior to Admission:    Prior to Admission medications    Medication Sig Start Date End Date Taking?  Authorizing Provider   B Complex-C-Folic Acid (BONI CAPS) 1 MG CAPS Take 1 mg by mouth daily   Yes Historical Provider, MD   bumetanide (BUMEX) 2 MG tablet Take 2 mg by mouth three times a week Given Sunday,Tuesday,Thursday   Yes Historical Provider, MD   diclofenac sodium (VOLTAREN) 1 % GEL Apply 2 g topically 2 times daily Apply to right shoulder   Yes Historical Provider, MD   insulin glargine (LANTUS SOLOSTAR) 100 UNIT/ML injection pen Inject 8 Units into the skin nightly   Yes Historical Provider, MD   insulin lispro, 1 Unit Dial, (HUMALOG KWIKPEN) 100 UNIT/ML SOPN Inject 0-4 Units into the skin 3 times daily   Yes Historical Provider, MD   isosorbide mononitrate (IMDUR) 30 MG extended release tablet Take 30 mg by mouth daily   Yes Historical Provider, MD   omeprazole (PRILOSEC) 20 MG delayed release capsule Take 20 mg by mouth nightly   Yes Historical Provider, MD   atorvastatin (LIPITOR) 40 MG tablet Take 1 tablet by mouth daily 3/8/21  Yes Shey May MD   allopurinol (ZYLOPRIM) 100 MG tablet Take 1 tablet by mouth daily 3/5/21  Yes Shey May MD   LUMIGAN 0.01 % SOLN ophthalmic drops Place 1 drop into the right eye nightly  6/9/20  Yes Historical Provider, MD   VELPHORO 500 MG CHEW Take 2 tablets by mouth 3 times daily (with meals)  1/9/20  Yes Historical Provider, MD   COMBIGAN 0.2-0.5 % ophthalmic solution Place 1 drop into the right eye every 12 hours  8/1/19  Yes Historical Provider, MD       Allergies:  Lasix [furosemide]    Social History:   TOBACCO:   reports that she has never smoked. She has never used smokeless tobacco.  ETOH:   reports no history of alcohol use. OCCUPATION: -     Family History:       Problem Relation Age of Onset   [facility] Breast Cancer Mother 61    Hypertension Mother     Heart Disease Father     Prostate Cancer Father     Breast Cancer Maternal Grandmother 61         Physical Exam   · Vitals: BP (!) 130/45   Pulse 65   Temp 97.4 °F (36.3 °C) (Temporal)   Resp 18   Ht 5' 10\" (1.778 m)   Wt 194 lb 11.2 oz (88.3 kg)   SpO2 100%   BMI 27.94 kg/m²     · General: Lying down , not in distress  · Neuro: Confused intermittently, follows all commands,slurred speech that is new,  answers all questions but forgets after 5 minutes, RUE 4/5 Power, tone and bulk normal , LUE 5/5 power, tone and bulk normal. Sensations , reflexes intact. NIHSS 3-4   · CVS: S1, S1 heard, regular rate and rhythm  · RS: NVBS heard bilaterally  · P/A: Soft, non tender, no organomegaly  · Extremities; Edema RUE > LUE.  1_ B/L pitting edema   Labs and Imaging Studies   Basic Labs  Recent Labs     05/19/21  1245 05/19/21  2125    138   K 3.9 4.2   CL 95* 96*   CO2 28 27 BUN 21 24*   CREATININE 3.9*  4.0* 4.6*   GLUCOSE 171* 166*   CALCIUM 8.8 8.9       Recent Labs     05/19/21  1245 05/19/21 2125   WBC 4.3* 5.6   RBC 3.71 3.51   HGB 10.2* 10.2*   HCT 34.2 32.3*   MCV 92.2 92.0   MCH 27.5 29.1   MCHC 29.8* 31.6*   RDW 16.9* 16.9*    177   MPV 10.7 10.2       CBC:   Lab Results   Component Value Date    WBC 5.6 05/19/2021    RBC 3.51 05/19/2021    HGB 10.2 05/19/2021    HCT 32.3 05/19/2021    MCV 92.0 05/19/2021    RDW 16.9 05/19/2021     05/19/2021     CMP:  Lab Results   Component Value Date     05/19/2021    K 4.2 05/19/2021    K 3.9 05/19/2021    CL 96 05/19/2021    CO2 27 05/19/2021    BUN 24 05/19/2021    PROT 6.8 05/19/2021       Imaging Studies:     CT HEAD WO CONTRAST    Result Date: 5/19/2021  EXAMINATION: CT OF THE HEAD WITHOUT CONTRAST  5/19/2021 12:48 pm TECHNIQUE: CT of the head was performed without the administration of intravenous contrast. Dose modulation, iterative reconstruction, and/or weight based adjustment of the mA/kV was utilized to reduce the radiation dose to as low as reasonably achievable. COMPARISON: None. HISTORY: ORDERING SYSTEM PROVIDED HISTORY: right arm weakness TECHNOLOGIST PROVIDED HISTORY: Has a \"code stroke\" or \"stroke alert\" been called? ->Yes Reason for exam:->right arm weakness Decision Support Exception - unselect if not a suspected or confirmed emergency medical condition->Emergency Medical Condition (MA) What reading provider will be dictating this exam?->[facility] FINDINGS: BRAIN/VENTRICLES: There is no acute intracranial hemorrhage, mass effect or midline shift. No abnormal extra-axial fluid collection. The gray-white differentiation is maintained without evidence of an acute infarct. There is no evidence of hydrocephalus. ORBITS: Prosthetic lenses are seen in the globes bilaterally. Devices along the lateral margins of the globes may be for the purposes of glaucoma treatment.  SINUSES: The visualized paranasal sinuses and mastoid air cells demonstrate no acute abnormality. SOFT TISSUES/SKULL:  No acute abnormality of the visualized skull or soft tissues. No acute intracranial abnormality. XR CHEST PORTABLE    Result Date: 2021  EXAMINATION: ONE XRAY VIEW OF THE CHEST 2021 7:34 pm COMPARISON: 2021 HISTORY: ORDERING SYSTEM PROVIDED HISTORY: check position of left CVC TECHNOLOGIST PROVIDED HISTORY: Reason for exam:->check position of left CVC What reading provider will be dictating this exam?->CRC FINDINGS: Pulmonary vascular congestive changes. There is no effusion or pneumothorax. Stable cardiomegaly. The osseous structures are without acute process. Right sided port a catheter tip in the SVC. Left-sided internal jugular line tip terminates at the level of the aortic arch and should be advanced. Cardiomegaly with pulmonary vascular congestive changes. Left-sided internal jugular line tip terminates in the region of the left aortic arch and should be advanced. Stable positioning of right sided port a catheter tip in the SVC. XR CHEST PORTABLE    Result Date: 2021  EXAMINATION: ONE XRAY VIEW OF THE CHEST 2021 1:46 pm COMPARISON: None. HISTORY: ORDERING SYSTEM PROVIDED HISTORY: Shortness of breath TECHNOLOGIST PROVIDED HISTORY: Reason for exam:->Shortness of breath What reading provider will be dictating this exam?->CRC FINDINGS: The lungs are without acute focal process. There is no effusion or pneumothorax. Cardiomegaly. The osseous structures are without acute process. Right-sided port a catheter tip in the SVC. No acute process. Cardiomegaly.      CTA NECK W CONTRAST    Result Date: 2021  Patient MRN:  [id number] : 1955 Age: 73 years Gender: Female Order Date:  2021 12:48 PM EXAM: CTA NECK W CONTRAST, CTA HEAD W CONTRAST NUMBER OF IMAGES:  [id number] INDICATION:  right arm weakness right arm weakness Decision Support Exception - unselect if not a suspected or confirmed emergency medical condition->Emergency Medical Condition (MA) What reading provider will be dictating this exam?->[facility] COMPARISON: None Technique: Low-dose CT  acquisition technique included one of following options; 1 . Automated exposure control, 2. Adjustment of MA and or KV according to patient's size or 3. Use of iterative reconstruction. Contiguous spiral images were obtained in the axial plane, following the administration of intravenous contrast using CT angiographic protocol. Sagittal and coronal images were reconstructed from the axial plane acquisition. Additional MIP reconstructions were presented to aid in the interpretation of this study. Images were obtained from the skull base cranially. There is mild calcified plaque identified in the vessels compatible with atherosclerotic disease. There is aortic arch and great vessels demonstrate mild atherosclerotic disease. The right carotid is unremarkable. The left carotid is unremarkable. The right vertebral artery is unremarkable. The left vertebral artery is unremarkable. The basilar artery is unremarkable. The middle cerebral arteries are unremarkable. The anterior cerebral arteries are unremarkable. The posterior cerebral arteries are unremarkable. 1.  Mild atherosclerotic disease . No large vessel occlusion identified 2. Estimated stenosis of the proximal right and left internal carotid artery by NASCET criteria is not hemodynamically significant This study was analyzed by the Viz. ai algorithm. CT BRAIN PERFUSION    Result Date: 2021  Patient MRN: [id number] : 1955 Age:  73 years Gender: Female Order Date: 2021 12:48 PM Exam: CT BRAIN PERFUSION Number of Images: 678 views Indication:   stroke alert stroke alert Decision Support Exception - unselect if not a suspected or confirmed emergency medical condition->Emergency Medical Condition (MA) What reading provider will be dictating this exam?->[facility] Comparison: None. Findings: Perfusion images demonstrate symmetric blood volume Blood flow images demonstrate symmetric blood flow There is no significant ischemic penumbra identified. There is no significant core infarct identified. No significant ischemic penumbra identified This study was analyzed by the Viz. ai algorithm. CTA HEAD W CONTRAST    Result Date: 2021  Patient MRN:  [id number] : 1955 Age: 73 years Gender: Female Order Date:  2021 12:48 PM EXAM: CTA NECK W CONTRAST, CTA HEAD W CONTRAST NUMBER OF IMAGES:  [id number] INDICATION:  right arm weakness right arm weakness Decision Support Exception - unselect if not a suspected or confirmed emergency medical condition->Emergency Medical Condition (MA) What reading provider will be dictating this exam?->[facility] COMPARISON: None Technique: Low-dose CT  acquisition technique included one of following options; 1 . Automated exposure control, 2. Adjustment of MA and or KV according to patient's size or 3. Use of iterative reconstruction. Contiguous spiral images were obtained in the axial plane, following the administration of intravenous contrast using CT angiographic protocol. Sagittal and coronal images were reconstructed from the axial plane acquisition. Additional MIP reconstructions were presented to aid in the interpretation of this study. Images were obtained from the skull base cranially. There is mild calcified plaque identified in the vessels compatible with atherosclerotic disease. There is aortic arch and great vessels demonstrate mild atherosclerotic disease. The right carotid is unremarkable. The left carotid is unremarkable. The right vertebral artery is unremarkable. The left vertebral artery is unremarkable. The basilar artery is unremarkable. The middle cerebral arteries are unremarkable. The anterior cerebral arteries are unremarkable. The posterior cerebral arteries are unremarkable. 1.  Mild atherosclerotic disease .  No large vessel occlusion [facility] [phone] REL1       Code Status:   Full    Cindy Ahmadi MD, PGY-1   Attending physician: Dr. Letty Joya

## 2021-05-20 NOTE — PLAN OF CARE
Problem: Skin Integrity:  Goal: Will show no infection signs and symptoms  Description: Will show no infection signs and symptoms  5/20/2021 0536 by Wilhemina Hodgkins, RN  Outcome: Met This Shift  5/20/2021 0535 by Wilhemina Hodgkins, RN  Outcome: Met This Shift  5/19/2021 1924 by Etta Read RN  Outcome: Met This Shift  5/19/2021 1844 by Etta Read RN  Outcome: Met This Shift  Goal: Absence of new skin breakdown  Description: Absence of new skin breakdown  5/20/2021 0536 by Wilhemina Hodgkins, RN  Outcome: Met This Shift  5/20/2021 0535 by Wilhemina Hodgkins, RN  Outcome: Met This Shift  5/19/2021 1924 by Etta Read RN  Outcome: Met This Shift  5/19/2021 1844 by Etta Read RN  Outcome: Met This Shift     Problem: Falls - Risk of:  Goal: Will remain free from falls  Description: Will remain free from falls  5/20/2021 0536 by Wilhemina Hodgkins, RN  Outcome: Met This Shift  5/20/2021 0535 by Wilhemina Hodgkins, RN  Outcome: Met This Shift  5/19/2021 1924 by Etta Read RN  Outcome: Met This Shift  5/19/2021 1844 by Etta Read RN  Outcome: Met This Shift  Goal: Absence of physical injury  Description: Absence of physical injury  5/20/2021 0536 by Wilhemina Hodgkins, RN  Outcome: Met This Shift  5/20/2021 0535 by Wilhemina Hodgkins, RN  Outcome: Met This Shift  5/19/2021 1924 by Etta Read RN  Outcome: Met This Shift  5/19/2021 1844 by Etta Read RN  Outcome: Met This Shift     Problem: Pain:  Goal: Pain level will decrease  Description: Pain level will decrease  5/20/2021 0536 by Wilhemina Hodgkins, RN  Outcome: Met This Shift  5/20/2021 0535 by Wilhemina Hodgkins, RN  Outcome: Met This Shift  5/19/2021 1924 by Etta Read RN  Outcome: Met This Shift  5/19/2021 1844 by Etta Read RN  Outcome: Met This Shift  Goal: Control of acute pain  Description: Control of acute pain  5/20/2021 0536 by Wilhemina Hodgkins, RN  Outcome: Met This Shift  5/20/2021 0535 by Wilhemina Hodgkins, RN  Outcome: Met This Shift  5/19/2021 1924 by Etta Read RN  Outcome: Met This Shift  5/19/2021 1844 by Polina Grace RN  Outcome: Met This Shift  Goal: Control of chronic pain  Description: Control of chronic pain  5/20/2021 0536 by Lamonte Cosby RN  Outcome: Met This Shift  5/20/2021 0535 by Lamonte Cosby RN  Outcome: Met This Shift  5/19/2021 1924 by Polina Grace RN  Outcome: Met This Shift  5/19/2021 1844 by Polina Grace RN  Outcome: Met This Shift

## 2021-05-20 NOTE — PROGRESS NOTES
[address] [facility]   Department of Internal Medicine   Internal Medicine Residency  MICU Progress Note    Patient:  Jabari Parisi 73 y.o. female   MRN: [id number]       Date of Service: 2021    Allergy: Lasix [furosemide]    Subjective     She stated that she is feeing well  Reports intermittent chest pain which is reproducible  Denies any nausea, vomiting, headache, dizziness  Saturating around 95% in room air although oxygenation drops to 90% while she moves or sits up  Seen by cardiology- off lidocaine drip  Stress test positive for large fixed defect in apical and septal walls, small fixed defect in the inferoapical wall, partially reversible defect in the small area of anterolateral wall; EF of 25% with apical septal akinesis, moderate global hypokinesia, dilated left ventricle  Significant drop in EF from 66% (11/)--> 25%    Objective     TEMPERATURE:  Current - Temp: 97.4 °F (36.3 °C); Max - Temp  Av.4 °F (36.3 °C)  Min: 97.2 °F (36.2 °C)  Max: 97.6 °F (36.4 °C)  RESPIRATIONS RANGE: Resp  Av.4  Min: 13  Max: 25  PULSE RANGE: Pulse  Av.3  Min: 57  Max: 79  BLOOD PRESSURE RANGE:  Systolic (30WYY), QNS:736 , Min:85 , CRF:287   ; Diastolic (94QFQ), CSV:29, Min:0, Max:133    PULSE OXIMETRY RANGE: SpO2  Av.8 %  Min: 95 %  Max: 100 %    I & O - 24hr:    Intake/Output Summary (Last 24 hours) at 2021 0811  Last data filed at 2021 0600  Gross per 24 hour   Intake 331 ml   Output 15 ml   Net 316 ml     I/O last 3 completed shifts: In: 331 [I.V.:331]  Out: 15 [Urine:15] No intake/output data recorded.    Weight change:     Physical Examination:  General: alert, awake, in no acute distress  HEENT: NC, AT, moist oral mucosa  Neck: supple  Pulmonary: clear to auscultation bilaterally, no wheezing or crackles  CV: RRR, S1 S2 heard, no MRG  Abd: soft, nontender, nondistended, BS +  Ext: no pedal edema  Musculoskeletal: right shoulder pain; right hand weakness due to pain  Neuro: Alert, awake, no gross focal neurological deficit      Medications     Continuous Infusions:   sodium chloride      heparin (PORCINE) Infusion 11.5 Units/kg/hr (05/20/21 0622)    lidocaine 1 mg/min (05/19/21 1621)    dextrose       Scheduled Meds:   ketamine  20 mg Intravenous Once    aspirin  81 mg Oral Daily    nitroglycerin  1 inch Topical 4 times per day    metoprolol tartrate  25 mg Oral BID    atorvastatin  40 mg Oral Nightly    pantoprazole  40 mg Intravenous Daily    And    sodium chloride (PF)  10 mL Intravenous Daily     PRN Meds: sodium chloride flush, sodium chloride, heparin (porcine), heparin (porcine), glucose, dextrose, glucagon (rDNA), dextrose  Nutrition:   Cardiac diet  NPO after midnight    Labs and Imaging Studies     CBC:   Recent Labs     05/19/21  1245 05/19/21 2125 05/20/21 0131 05/20/21 0449   WBC 4.3* 5.6  --  5.5   HGB 10.2* 10.2* 9.3* 9.5*   HCT 34.2 32.3* 30.8* 29.9*   MCV 92.2 92.0  --  90.9    177  --  178       BMP:    Recent Labs     05/19/21  1245 05/19/21 2125 05/20/21 0449    138 138   K 3.9 4.2 4.6   CL 95* 96* 98   CO2 28 27 26   BUN 21 24* 28*   CREATININE 3.9*  4.0* 4.6* 5.0*   GLUCOSE 171* 166* 109*       LIVER PROFILE:   Recent Labs     05/19/21 1245 05/19/21 2125 05/20/21 0449   * 102* 67*   ALT 98* 116* 99*   BILIDIR  --  <0.2 0.2   BILITOT 0.5 0.6 0.5   ALKPHOS 133* 136* 117*       PT/INR:   Recent Labs     05/19/21 1245   PROTIME 11.5   INR 1.1       APTT:   Recent Labs     05/19/21 1245 05/20/21 0131 05/20/21 0449   APTT 36.5* 75.0* 72.8*       Fasting Lipid Panel:    Lab Results   Component Value Date    CHOL 101 05/19/2021    TRIG 106 05/19/2021    HDL 38 05/19/2021       Cardiac Enzymes:    Lab Results   Component Value Date    CKTOTAL 63 05/20/2021    CKTOTAL 67 05/19/2021    CKTOTAL 57 05/19/2021    CKMB 3.5 05/20/2021    CKMB 4.3 05/19/2021    CKMB 2.7 05/19/2021    TROPONINI 0.81 (H) 05/20/2021    TROPONINI 0.82 (H) 05/19/2021    TROPONINI 0.87 (H) 05/19/2021       Notable Cultures:      Blood cultures   Blood Culture, Routine   Date Value Ref Range Status   11/15/2017 5 Days- no growth  Final     Respiratory cultures No results found for: RESPCULTURE   Gram Stain Result   Date Value Ref Range Status   10/12/2014 Refer to ordered Gram stain for results  Final     Urine   Urine Culture, Routine   Date Value Ref Range Status   11/07/2017 Growth not present  Final     Legionella No results found for: LABLEGI  C Diff PCR No results found for: CDIFPCR  Wound culture/abscess: No results for input(s): WNDABS in the last 72 hours. Tip culture:No results for input(s): CXCATHTIP in the last 72 hours. Antibiotic  Days  Day started                                Oxygen:     Vent Information  SpO2: 97 %  Additional Respiratory  Assessments  Pulse: 64  Resp: 17  SpO2: 97 %     Nasal cannula L/min     Face mask %     Reservoirs mask %       ABG   Vent Settings     PH   Mode      PCO2   TV      PO2   RR      HCO3   PS      Sat%   PEEP      FIO2   FIO2        P/F          Lines:  Site  Day  Date inserted     TLC              PICC              Arterial line              Peripheral line              HD cath            Urethral Catheter-Output (mL): 10 mL    Imaging Studies:    XR CHEST PORTABLE   Final Result   Cardiomegaly with pulmonary vascular congestive changes. Left-sided internal jugular line tip terminates in the region of the left   aortic arch and should be advanced. Stable positioning of right sided port a catheter tip in the SVC. XR CHEST PORTABLE   Final Result   No acute process. Cardiomegaly. CTA HEAD W CONTRAST   Final Result   1. Mild atherosclerotic disease . No large vessel occlusion   identified   2. Estimated stenosis of the proximal right and left internal carotid   artery by NASCET criteria is not hemodynamically significant         This study was analyzed by the [employer]. ai algorithm. CTA NECK W CONTRAST   Final Result   1. Mild atherosclerotic disease . No large vessel occlusion   identified   2. Estimated stenosis of the proximal right and left internal carotid   artery by NASCET criteria is not hemodynamically significant         This study was analyzed by the [employer]. ai algorithm. CT BRAIN PERFUSION   Final Result      No significant ischemic penumbra identified      This study was analyzed by the [employer]. ai algorithm. CT HEAD WO CONTRAST   Final Result   No acute intracranial abnormality. NM Cardiac Stress Test Nuclear Imaging    (Results Pending)         Resident's Assessment and Plan      Catherine Cobian is 73 y.o. female with a PMH of hypertension, hyperlipidemia, obesity, type 2 diabetes mellitus with neuropathy/retinopathy, right breast cancer s/p mastectomy/XRT with right upper extremity lymphedema, bladder cancer s/p chemotherapy, right-sided Mediport in place. ESRD on dialysis, CAD s/p [facility] 2011 (90% stenosis of LCx no PCI), spinal cord disease s/p spinal implant presented after being found unresponsive at the time of dialysis and received shock on time due to shockable rhythm on monitor. Assessment:   1. S/p cardiac arrest; received shock x1, rhythm unknown; likely pulseless v tack or ventricular fibrillation  2. Sustained ventricular tachycardia; was on lidocaine drip  3. CAD s/p C revealing 90% stenosis or LCx (2011); echocardiography revealing EF of 60-65%, moderate LVH, aortic valve sclerosis (12/2020); Stress test positive for large fixed defect in apical and septal walls, small fixed defect in the inferoapical wall, partially reversible defect in the small area of anterolateral wall; EF of 25% with apical septal akinesis, moderate global hypokinesia, dilated left ventricle        Significant drop in EF from 66% (11/20)--> 25%  4.  Elevated troponin in the setting of ERIKA likely secondary to NSTEMI; intermittent chest pain for the past 2 weeks; Troponin 0.87--> 0.81  5. Acute metabolic encephalopathy likely due to cardiac arrest/ metabolic derangement; resolved  6. Slurring of speech and right sided weakness likely due to TIA; CT head, CT cervical, CT brain unremarkable  7. ESRD on HD; MWF; Baseline creatinine of 3.8-4.5  8. Right shoulder pain due to osteoarthritis  9. History of right breast carcinoma, s/p mastectomy, radiotherapy with residual right upper extremity lymphedema  10.  History of bladder carcinoma, S/p chemotherapy ( around 2009, 2010)  6. Christianity    Plan:  · Stress test positive for reversible defect; scheduled for cardiac catheterization tomorrow  · Continue heparin drip  · Discontinue lidocaine drip per cardiology  · Will get stress test today; low threshold for cardiac catheterization  · Continue aspirin, metoprolol, atorvastatin  · Monitor H/H Q12 hourly  · Monitor aPTT  · Neurology consult  · Nephrology on board  · NPO after midnight  · Aspiration/fall precautions    # Peptic ulcer prophylaxis: Pantoprazole  # DVT Prophylaxis: Heparin   # Disposition: Continue current care    Mercedes Verdin MD, PGY-2  Internal medicine resident  Attending Physician: Dr. Shanell Paulino    See my consult  [address] [facility] MD Nikolas,[facility]  Pulmonary&Critical Care Medicine   Director of [address] [place] [address] Director of [address] [place] [address]    Sadi Lopes

## 2021-05-20 NOTE — PROGRESS NOTES
[facility]  Internal Medicine Residency Program  Progress Note - House Team 2    Patient:  Abhay Zamorano 73 y.o. female MRN: [id number]     Date of Service: 5/20/2021     CC: Vtach/Vfib arrest x2   Overnight events: Riley Ramsey is a 73 y.o. female with PMH of HTN, T2DM, CAD w/ cath showing 90% stenosis of L circumflex, ESRD on HD, R breast cancer s/p mastectomy w/ residual lymphedema, and HFpEF (EF 60-65%), who presented to the ED with cardiac arrest.    On day 1 of hospital admission   Patient was seen at bed side in the am.   They are awake alert and respond to questions appropriately. Endorses some anterior chest wall pain, reproducible to palpation. Denies dyspnea/cough, abdominal pain. Objective     Physical Exam:  Vitals: BP (!) 121/52   Pulse 63   Temp 97.5 °F (36.4 °C) (Temporal)   Resp 17   Ht 5' 10\" (1.778 m)   Wt 200 lb 14.4 oz (91.1 kg)   SpO2 95%   BMI 28.83 kg/m²       Constitutional: Alert, A&Ox3, Follows commands. In no apparent distress. Head: Normocephalic and atraumatic. Eyes: Conjunctiva normal, (-) scleral icterus. Mucus membranes moist.  Mouth: Mucus membranes moist. Oropharynx clear. No deviation of the tongue or uvula. Neck: (-)  swelling, trachea midline  Respiratory: NC in place. Lungs clear to auscultation bilaterally. (-)  wheezes, (-)  rales, (-)  Rhonchi. No increased work of breathing or respiratory distress. Cardiovascular: RRR. (-) murmurs, (-) gallops, (-) rubs. S1 and S2 were normal. L AV fistula present, R-sided mediport in place. L femoral CVC in place. GI:  Abdomen soft, non-tender, non-distended. (+) BS. (-) guarding, (-) rigidity. Extremities: Warm and well perfused. (-) clubbing, (-) cyanosis. (-) peripheral edema. (-) sacral pitting edema. Neurologic: A&O x3. No focal neurological deficits.  No speech slurring or tremor    Pertinent Labs & Imaging Studies   aleks  CBC with Differential:    Lab Results Component Value Date    WBC 5.5 05/20/2021    RBC 3.29 05/20/2021    HGB 9.5 05/20/2021    HCT 29.9 05/20/2021     05/20/2021    MCV 90.9 05/20/2021    MCH 28.9 05/20/2021    MCHC 31.8 05/20/2021    RDW 16.5 05/20/2021    NRBC 0.0 02/18/2017    SEGSPCT 70 03/12/2014    BANDSPCT 1 02/18/2015    LYMPHOPCT 16.5 05/20/2021    PROMYELOPCT 1.8 02/18/2017    MONOPCT 10.2 05/20/2021    MYELOPCT 0.9 10/24/2017    BASOPCT 0.4 05/20/2021    MONOSABS 0.56 05/20/2021    LYMPHSABS 0.91 05/20/2021    EOSABS 0.06 05/20/2021    BASOSABS 0.02 05/20/2021     BMP:    Lab Results   Component Value Date     05/20/2021    K 4.6 05/20/2021    K 3.9 05/19/2021    CL 98 05/20/2021    CO2 26 05/20/2021    BUN 28 05/20/2021    LABALBU 3.6 05/20/2021    LABALBU 3.8 04/02/2012    CREATININE 5.0 05/20/2021    CALCIUM 8.8 05/20/2021    GFRAA 10 05/20/2021    LABGLOM 10 05/20/2021    GLUCOSE 109 05/20/2021    GLUCOSE 46 04/02/2012     Hepatic Function Panel:    Lab Results   Component Value Date    ALKPHOS 117 05/20/2021    ALT 99 05/20/2021    AST 67 05/20/2021    PROT 6.0 05/20/2021    BILITOT 0.5 05/20/2021    BILIDIR 0.2 05/20/2021    IBILI 0.3 05/20/2021    LABALBU 3.6 05/20/2021    LABALBU 3.8 04/02/2012     Magnesium:    Lab Results   Component Value Date    MG 2.4 05/20/2021     Phosphorus:    Lab Results   Component Value Date    PHOS 4.5 05/20/2021     PTT:    Lab Results   Component Value Date    APTT 76.8 05/20/2021   [APTT}  Last 3 Troponin:    Lab Results   Component Value Date    TROPONINI 0.83 05/20/2021    TROPONINI 0.81 05/20/2021    TROPONINI 0.82 05/19/2021     CK   Total CK 58  20 - 180 U/L Final 05/20/2021  8:06 AM  - [facility]     CK-MB  CK-MB 2.7  0.0 - 4.3 ng/mL Final 05/20/2021  8:06 AM  - [address] [place] [address] Lab     Resident's Assessment and Plan     Assessment     Vtach/Vfib arrest, r/o ischemic cause in setting of multiple cardiac risk factors  Prior heart cath showing 90% stenosis of L circumflex artery - medically managed   Admission labs showed no severe electrolyte dyscrasias     Acute encephalopathy in the setting of #1 - improved significantly   Pt initially demonstrating retrograde amnesia, short attention span - improved on exam this AM    Hx T2DM  Hgb A1c 6.6% as of 5/19/2021     Hx ESRD on HD    Plan  Lidocaine gtt d/c by Cardiology this AM    Lexiscan stress test performed this AM - no ischemia or arrhythmia during infusion.  Will follow full report of results   Plan for possible LHC pending results of stress test   F/u Echo   Continue ASA, statin, beta-blocker, heparin gtt   Continue HD while inpatient - Nephro following   Neurology consulted for AMS - appreciate recs   Hgb/Hct q12  Daily labs - replete electrolytes as necessary     PT/OT evaluation: Not indicated at this time  DVT prophylaxis/ GI prophylaxis: Heparin / Protonix   Disposition: Continue current medical management      Chucky Rosado DO, PGY-1  Attending physician: Dr. Barbara Howard

## 2021-05-20 NOTE — PLAN OF CARE
Problem: Skin Integrity:  Goal: Will show no infection signs and symptoms  Description: Will show no infection signs and symptoms  5/20/2021 1545 by Frederick Figueredo RN  Outcome: Met This Shift  5/20/2021 1544 by Frederick Figueredo RN  Outcome: Met This Shift  5/20/2021 0536 by Dee Deng RN  Outcome: Met This Shift  5/20/2021 0535 by Dee Deng RN  Outcome: Met This Shift  Goal: Absence of new skin breakdown  Description: Absence of new skin breakdown  5/20/2021 1545 by Frederick Figueredo RN  Outcome: Met This Shift  5/20/2021 1544 by Frederick Figueredo RN  Outcome: Met This Shift  5/20/2021 0536 by Dee Deng RN  Outcome: Met This Shift  5/20/2021 0535 by Dee Deng RN  Outcome: Met This Shift     Problem: Falls - Risk of:  Goal: Will remain free from falls  Description: Will remain free from falls  5/20/2021 1545 by Frederick Figueredo RN  Outcome: Met This Shift  5/20/2021 1544 by Frederick Figueredo RN  Outcome: Met This Shift  5/20/2021 0536 by Dee Deng RN  Outcome: Met This Shift  5/20/2021 0535 by Dee Deng RN  Outcome: Met This Shift  Goal: Absence of physical injury  Description: Absence of physical injury  5/20/2021 1545 by Frederick Figueredo RN  Outcome: Met This Shift  5/20/2021 1544 by Frederick Figueredo RN  Outcome: Met This Shift  5/20/2021 0536 by Dee Deng RN  Outcome: Met This Shift  5/20/2021 0535 by Dee Deng RN  Outcome: Met This Shift     Problem: Pain:  Goal: Pain level will decrease  Description: Pain level will decrease  5/20/2021 1545 by Frederick Figueredo RN  Outcome: Met This Shift  5/20/2021 0536 by Dee Deng RN  Outcome: Met This Shift  5/20/2021 0535 by Dee Deng RN  Outcome: Met This Shift  Goal: Control of acute pain  Description: Control of acute pain  5/20/2021 0536 by Dee Deng RN  Outcome: Met This Shift  5/20/2021 0535 by Dee Deng RN  Outcome: Met This Shift  Goal: Control of chronic pain  Description: Control of chronic pain  5/20/2021 0536 by Dee Deng RN  Outcome: Met This Shift  5/20/2021 0535 by Brendon [place], RN  Outcome: Met This Shift

## 2021-05-20 NOTE — PROGRESS NOTES
Lexiscan Stress Test:    Reason for study: s/p Cardiac arrest, history of CAD    Resting EKG showed sinus rhythm, non diagnostic ST/T changes lateral leads    Patient receive 0.4mg Lexiscan per protocol    Symptoms: No chest pain; mild short of breath-resolved    EKG:  No ischemia or arrhythmia during Lexiscan infusion. Maximal heart rate 67        Peak /50mmHg       Post test complications: None      SPECT image report pending.     Electronically signed by Giuliano Christianson MD on 5/20/2021 at 12:21 PM

## 2021-05-20 NOTE — PROGRESS NOTES
Inpatient Cardiology Progress note     PATIENT IS BEING FOLLOWED FOR: post cardiac arrest    Dennis Haney is a 73 y.o. AAF known to Dr Emily Hurt: Complains of localized area of L parasternal discomfort which was reproducible with palpation. Denied other types of CP. Denies SOB, palpations or lightheadedness. OBJECTIVE: No apparent distress     ROS:  Consist: Denies fevers, chills or night sweats  Heart: Denies chest pain, palpitations, lightheadedness, dizziness or syncope  Lungs: Denies SOB, cough, wheezing, orthopnea or PND  GI: Denies abdominal pain, vomiting or diarrhea    PHYSICAL EXAM:   BP (!) 121/52   Pulse 68   Temp 98.4 °F (36.9 °C) (Temporal)   Resp 18   Ht 5' 10\" (1.778 m)   Wt 200 lb 14.4 oz (91.1 kg)   SpO2 94%   BMI 28.83 kg/m²    B/P Range last 24 hours: Systolic (57GQX), QUJ:922 , Min:85 , CMU:199    Diastolic (47NKW), CTL:77, Min:0, Max:133  CONST:  Well developed, AAF who appears older than her stated age. Awake and alert. No apparent distress. HEENT:   Head- Normocephalic, atraumatic   Eyes- Conjunctivae pink, anicteric  Throat- Oral mucosa pink and moist  Neck-  Thick. No stridor, trachea midline, no jugular venous distention. No carotid bruit. .    CHEST: Chest symmetrical and no grimacing to palpation. No accessory muscle use or intercostal retractions. R chest wall infusa-port  RESPIRATORY: Lung sounds - diminished in the bases secondary to poor inspiratory effort, on NC O2  CARDIOVASCULAR:     Heart Ausculation- Regular rate and rhythm, no murmur heard. PV: RUE lymphedema. No lower extremity edema. No varicosities. Pedal pulses palpable, no clubbing or cyanosis   ABDOMEN: Soft, no grimacing to light palpation. Bowel sounds present. No palpable masses; no abdominal bruit  MS: Good muscle strength and tone. No atrophy or abnormal movements. : Deferred  SKIN: Warm and dry no statis dermatitis or ulcers   NEURO / PSYCH: A&O x 3, moves all extremities. Intake/Output Summary (Last 24 hours) at 5/20/2021 1107  Last data filed at 5/20/2021 0600  Gross per 24 hour   Intake 331 ml   Output 15 ml   Net 316 ml       Weight:   Wt Readings from Last 3 Encounters:   05/20/21 200 lb 14.4 oz (91.1 kg)   05/19/21 189 lb (85.7 kg)   02/25/21 189 lb (85.7 kg)     Current Inpatient Medications:   technetium sestamibi  10 millicurie Intravenous Once    technetium sestamibi  35 millicurie Intravenous Once    aspirin  81 mg Oral Daily    nitroglycerin  1 inch Topical 4 times per day    metoprolol tartrate  25 mg Oral BID    atorvastatin  40 mg Oral Nightly    pantoprazole  40 mg Intravenous Daily    And    sodium chloride (PF)  10 mL Intravenous Daily       IV Infusions (if any):   sodium chloride      heparin (PORCINE) Infusion 11.5 Units/kg/hr (05/20/21 0622)    dextrose         DIAGNOSTIC/ LABORATORY DATA:  Labs:   CBC:   Recent Labs     05/19/21 2125 05/20/21 0131 05/20/21 0449   WBC 5.6  --  5.5   HGB 10.2* 9.3* 9.5*   HCT 32.3* 30.8* 29.9*     --  178     BMP:   Recent Labs     05/19/21 2125 05/20/21 0449    138   K 4.2 4.6   CO2 27 26   BUN 24* 28*   CREATININE 4.6* 5.0*   LABGLOM 12 10   CALCIUM 8.9 8.8     Mag:   Recent Labs     05/19/21 2125 05/20/21 0449   MG 2.3 2.4     Phos:   Recent Labs     05/19/21 2125 05/20/21  0449   PHOS 4.2 4.5     TFT:   Lab Results   Component Value Date    TSH 2.810 05/19/2021    X7HIVUN 5.0 12/24/2012    T4FREE 1.58 05/19/2021      HgA1c:   Lab Results   Component Value Date    LABA1C 6.6 (H) 05/19/2021     No results found for: EAG    BNP: No results for input(s): BNP in the last 72 hours.   PT/INR:   Recent Labs     05/19/21  1245   PROTIME 11.5   INR 1.1     APTT:  Recent Labs     05/20/21  0449 05/20/21  0806   APTT 72.8* 76.8*     CARDIAC ENZYMES:  Recent Labs     05/19/21  2125 05/20/21  0131 05/20/21  0806   CKTOTAL 67 63 58   CKMB 4.3 3.5 2.7   TROPONINI 0.82* 0.81* 0.83*     FASTING LIPID PANEL:  Lab Results   Component Value Date    CHOL 101 05/19/2021    HDL 38 05/19/2021    LDLCALC 42 05/19/2021    TRIG 106 05/19/2021     LIVER PROFILE:  Recent Labs     05/19/21 2125 05/20/21  0449   * 67*   * 99*   LABALBU 4.0 3.6      CT Head 5/19/2021: No acute intracranial abnormality     CT Brain perfusion 5/19/2021: No significant ischemic penumbra identified      CTA Head/Neck W 5/19/221: Mild atherosclerotic disease . No large vessel occlusion   Identified. Estimated stenosis of the proximal right and left internal carotid   artery by NASCET criteria is not hemodynamically significan    Telemetry: SR    Lexiscan MPS: in process    ASSESSMENT:     1. Episode of unresponsiveness with reported shockable rhythm by AED s/p defibrillation x 1  2. Reported VT in ED 5/19/2021. No recurrence. On Lidocaine gtt         3. AMS, confusion improved/resolved  4. Elevated troponin (more than baseline), pattern not consistent with ACS  5. No acute ischemic EKG changes  6. Known history of CAD, treated medically. 7. TTE 11/2020 at [facility] EF 60-65% with moderate CLVH and no reported valvular abnormalities  8. ESRD on HD through LUE AVF  9. Anemia with drop in H&H since admission but stable on repeat. 10. Elevated LFT's, trending down  11. HTN  12. HLD  13. Obesity  14. T2DM insulin requiring with neuropathy/retinopathy, nephropathy and R foot charcot  15. R breast breast carcinoma s/p R masectomy s/p XRT. 16. RUE lymphedema  17. Bladder carcinoma s/p chemotherapy  18. Gait difficulty ambulates with walker  19. Jehovah Witness    PLAN:  1. Stop Lidocaine gtt  2. Continue other cardiac medications  3. Lexiscan MPS today, consider [facility] pending results.    4. Will follow    Electronically signed by Tayla Villa MD on 5/20/2021 at 11:07 AM

## 2021-05-20 NOTE — PROCEDURES
Procedure: Left femoral arterial line placement. Indications: Continuous monitoring of blood pressure in a patient with hypotension +/- shock, on Levophed. Anesthesia: Local infiltration of 1% lidocaine. Consent:  Informed written obtained. Technique:  Procedure was done using strict aseptic technique. Left femoral site was cleaned with chloraprep and draped. Radial artery was identified, then Lidocaine 1% was infiltrated locally. Radial arterial line was inserted, a good blood flow was obtained, after which guidewire was inserted all the way with no resistance. Then the canula was inserted and needle with guidewire was withdrawn. Pulsatile bright red blood flow was observed. The canula was connected to BP monitoring apparatus and a good waveform was noted. Then the canula was secured with 2 stay sutures of 3-0 silk after Lidocaine infiltration, following which dressing was applied. Number of sticks: 1. Number of Kits used: 1. Complications: No immediate complication. Estimated blood loss: About 1 ml. Comment: Patient tolerated the procedure well.      Herve Gilman MD MD,PGY2

## 2021-05-20 NOTE — PLAN OF CARE
Problem: Skin Integrity:  Goal: Will show no infection signs and symptoms  Description: Will show no infection signs and symptoms  5/20/2021 0535 by Angela Yi RN  Outcome: Met This Shift  5/19/2021 1924 by Jerad Salgado RN  Outcome: Met This Shift  5/19/2021 1844 by Jerad Salgado RN  Outcome: Met This Shift  Goal: Absence of new skin breakdown  Description: Absence of new skin breakdown  5/20/2021 0535 by Angela Yi RN  Outcome: Met This Shift  5/19/2021 1924 by Jerad Salgado RN  Outcome: Met This Shift  5/19/2021 1844 by Jerad Salgado RN  Outcome: Met This Shift     Problem: Falls - Risk of:  Goal: Will remain free from falls  Description: Will remain free from falls  5/20/2021 0535 by Angela Yi RN  Outcome: Met This Shift  5/19/2021 1924 by Jerad Salgado RN  Outcome: Met This Shift  5/19/2021 1844 by Jerad Salgado RN  Outcome: Met This Shift  Goal: Absence of physical injury  Description: Absence of physical injury  5/20/2021 0535 by Angela Yi RN  Outcome: Met This Shift  5/19/2021 1924 by Jerad Salgado RN  Outcome: Met This Shift  5/19/2021 1844 by Jerad Salgado RN  Outcome: Met This Shift     Problem: Pain:  Goal: Pain level will decrease  Description: Pain level will decrease  5/20/2021 0535 by Angela Yi RN  Outcome: Met This Shift  5/19/2021 1924 by Jerad Salgado RN  Outcome: Met This Shift  5/19/2021 1844 by Jerad Salgado RN  Outcome: Met This Shift  Goal: Control of acute pain  Description: Control of acute pain  5/20/2021 0535 by Angela Yi RN  Outcome: Met This Shift  5/19/2021 1924 by Jerad Salgado RN  Outcome: Met This Shift  5/19/2021 1844 by Jerad Salgado RN  Outcome: Met This Shift  Goal: Control of chronic pain  Description: Control of chronic pain  5/20/2021 0535 by Angela Yi RN  Outcome: Met This Shift  5/19/2021 1924 by Jerad Salgado RN  Outcome: Met This Shift  5/19/2021 1844 by Jerad Salgado RN  Outcome: Met This Shift

## 2021-05-20 NOTE — PROGRESS NOTES
Physician Progress Note      PATIENTJune Board  CSN #:                  [id number]  :                       1955  ADMIT DATE:       2021 12:41 PM  [address] [place] DATE:  RESPONDING  PROVIDER #:        Paco To MD          QUERY TEXT:    Dr. Jaky Beltran,    Pt admitted with V-fib arrest and has encephalopathy documented. If possible,   please document in progress notes and discharge summary further specificity   regarding the type of encephalopathy:      The medical record reflects the following:  Risk Factors: ESRD  Clinical Indicators: Documentation of \" Acute encephalopathy with slurred   speech likely 2/2  embolic Stroke in evolution vs cardiac arrest\" in H&P,   Documentation of  \"Acute encephalopathy, likely 2/2 Ketamine vs CVA\" per   consult note on ,  Treatment: Close pt monitoring, Cardiology consult, Critical care consult    Thank you,  Domo IVERSON, RN  Clinical Documentation Improvement  [email or web address]  Office #: [phone]  Cell #: [phone]  Options provided:  -- Anoxic encephalopathy  -- Hypertensive encephalopathy  -- Metabolic encephalopathy  -- Toxic encephalopathy  -- Drug induced encephalopathy due to Ketamine  -- Other - I will add my own diagnosis  -- Disagree - Not applicable / Not valid  -- Disagree - Clinically unable to determine / Unknown  -- Refer to Clinical Documentation Reviewer    PROVIDER RESPONSE TEXT:    Acute encephalopathy following cardiac arrest    Query created by: Anaid Lawson on 2021 11:27 AM      Electronically signed by:  Paco To MD 2021 1:24 PM

## 2021-05-20 NOTE — PLAN OF CARE
Problem: Skin Integrity:  Goal: Will show no infection signs and symptoms  Description: Will show no infection signs and symptoms  5/20/2021 1544 by Vanita Gutierrez RN  Outcome: Met This Shift  5/20/2021 0536 by Varun Hannah RN  Outcome: Met This Shift  5/20/2021 0535 by Varun Hannah RN  Outcome: Met This Shift  Goal: Absence of new skin breakdown  Description: Absence of new skin breakdown  5/20/2021 1544 by Vanita Gutierrez RN  Outcome: Met This Shift  5/20/2021 0536 by Varun Hannah RN  Outcome: Met This Shift  5/20/2021 0535 by Varun Hannah RN  Outcome: Met This Shift     Problem: Falls - Risk of:  Goal: Will remain free from falls  Description: Will remain free from falls  5/20/2021 1544 by Vanita Gutierrez RN  Outcome: Met This Shift  5/20/2021 0536 by Varun Hannah RN  Outcome: Met This Shift  5/20/2021 0535 by Varun Hannah RN  Outcome: Met This Shift  Goal: Absence of physical injury  Description: Absence of physical injury  5/20/2021 1544 by Vanita Gutierrez RN  Outcome: Met This Shift  5/20/2021 0536 by Varun Hannah RN  Outcome: Met This Shift  5/20/2021 0535 by Varun Hannah RN  Outcome: Met This Shift     Problem: Pain:  Goal: Pain level will decrease  Description: Pain level will decrease  5/20/2021 0536 by Varun Hannah RN  Outcome: Met This Shift  5/20/2021 0535 by Varun Hannah RN  Outcome: Met This Shift  Goal: Control of acute pain  Description: Control of acute pain  5/20/2021 0536 by Varun Hannah RN  Outcome: Met This Shift  5/20/2021 0535 by Varun Hannah RN  Outcome: Met This Shift  Goal: Control of chronic pain  Description: Control of chronic pain  5/20/2021 0536 by Varun Hannah RN  Outcome: Met This Shift  5/20/2021 0535 by Varun Hannah RN  Outcome: Met This Shift

## 2021-05-20 NOTE — CONSULTS
Nephrology Consult  [facility]  Antonio Ashley. Jazmine HAGEN    CC:   vt arrest    HPI:   The pt is a 72 yo female who has a pmh of dm, htn, chronic back pain, R breast cancer, esrd on hd mwf, cad with 90% LAD lesion on cath 2012, HFpEF who was at dialysis yesterday and became unresponsive and pulseless. aed was placed and she was in vt and was shocked. She was brought to the er and went into vt again and was placed on a lidocaine drip and heparin. She had RUE weakness and slurred speech but cerebral imaging was negative. According to family she had been having some cp last week. She has been hemodynamically stable. She is having a stress test today. PMH:    Past Medical History:   Diagnosis Date    Acute infection of bone ([address]     infection of rt foot, resolved.     Anemia of chronic disease     Breast cancer ([address]     right breast, 2008/ bladder, 2006- last chemotherapy \"years ago\"    CAD (coronary artery disease)     Carpal tunnel syndrome     bilat - for OR left hand 3-17-20     Chronic diastolic CHF (congestive heart failure) ([address] 09/23/2014 9/23/14- echocardiogram revealed moderate LV concentric hypertrophy, stage III diastolic dysfunction, mild tricuspid regurgitation    CKD (chronic kidney disease) stage 4, GFR 15-29 ml/min ([facility])     Diabetic retinopathy ([address]     Glaucoma     Hemodialysis patient ([address]     [facility]  mon wed fri- Dr. Melvina Bain - left arm fistula     Hyperkalemia, diminished renal excretion 11/9/2017    Hypertension     Hypoglycemia unawareness in type 1 diabetes mellitus ([address] 11/7/2017    Insulin dependent type 2 diabetes mellitus ([address]     Neuropathy     feet    Osteomyelitis due to secondary diabetes ([address]     rt great toe with amputation    Patient is Spiritism 11/7/2017    Refusal of blood product     patient states she dose not take blood transfusion    Ventricular hypertrophy     Vitreous hemorrhage ([address]     left eye       Patient Active Problem List (PORCINE) Infusion 11.5 Units/kg/hr (05/20/21 0622)    dextrose         Meds prn:     regadenoson, sodium chloride flush, sodium chloride, heparin (porcine), heparin (porcine), glucose, dextrose, glucagon (rDNA), dextrose    Meds prior to admission:     No current facility-administered medications on file prior to encounter. Current Outpatient Medications on File Prior to Encounter   Medication Sig Dispense Refill    B Complex-C-Folic Acid (BONI CAPS) 1 MG CAPS Take 1 mg by mouth daily      bumetanide (BUMEX) 2 MG tablet Take 2 mg by mouth three times a week Given Sunday,Tuesday,Thursday      diclofenac sodium (VOLTAREN) 1 % GEL Apply 2 g topically 2 times daily Apply to right shoulder      insulin glargine (LANTUS SOLOSTAR) 100 UNIT/ML injection pen Inject 8 Units into the skin nightly      insulin lispro, 1 Unit Dial, (HUMALOG KWIKPEN) 100 UNIT/ML SOPN Inject 0-4 Units into the skin 3 times daily      isosorbide mononitrate (IMDUR) 30 MG extended release tablet Take 30 mg by mouth daily      omeprazole (PRILOSEC) 20 MG delayed release capsule Take 20 mg by mouth nightly      atorvastatin (LIPITOR) 40 MG tablet Take 1 tablet by mouth daily 90 tablet 1    allopurinol (ZYLOPRIM) 100 MG tablet Take 1 tablet by mouth daily 90 tablet 1    LUMIGAN 0.01 % SOLN ophthalmic drops Place 1 drop into the right eye nightly       VELPHORO 500 MG CHEW Take 2 tablets by mouth 3 times daily (with meals)       COMBIGAN 0.2-0.5 % ophthalmic solution Place 1 drop into the right eye every 12 hours   7       Allergies:    Lasix [furosemide]    Social History:     reports that she has never smoked. She has never used smokeless tobacco. She reports that she does not drink alcohol and does not use drugs.     Family History:         Problem Relation Age of Onset   [facility] Breast Cancer Mother 61    Hypertension Mother     Heart Disease Father     Prostate Cancer Father     Breast Cancer Maternal Grandmother 60       ROS: 05/19/21 1247 (!) 150/133 97.3 °F (36.3 °C) -- 79 16 96 % -- --         Intake/Output Summary (Last 24 hours) at 5/20/2021 1154  Last data filed at 5/20/2021 1000  Gross per 24 hour   Intake 331 ml   Output 20 ml   Net 311 ml       Constitutional: Patient in no acute distress   Head: normocephalic, atraumatic   Neck: supple, no jvd  Cardiovascular: regular rate and rhythm, no murmurs, gallops, or rubs   Respiratory: Clear, no rales, rhochi, or wheezes,   Gastrointestinal: soft, nontender, nondistended, no hepatosplenomegaly  Ext: no edema  Neuro: awake  Skin: dry, no rash   Back: nontender    Data:    Recent Labs     05/19/21  1245 05/19/21 2125 05/20/21  0131 05/20/21  0449   WBC 4.3* 5.6  --  5.5   HGB 10.2* 10.2* 9.3* 9.5*   HCT 34.2 32.3* 30.8* 29.9*   MCV 92.2 92.0  --  90.9    177  --  178       Recent Labs     05/19/21  1245 05/19/21 2125 05/20/21  0449    138 138   K 3.9 4.2 4.6   CL 95* 96* 98   CO2 28 27 26   CREATININE 3.9*  4.0* 4.6* 5.0*   BUN 21 24* 28*   LABGLOM 14  11 12 10   GLUCOSE 171* 166* 109*   CALCIUM 8.8 8.9 8.8   PHOS  --  4.2 4.5   MG 2.4 2.3 2.4       Vit D, 25-Hydroxy   Date Value Ref Range Status   06/20/2017 26 (L) 30 - 100 ng/mL Final     Comment:     <20 ng/mL. ........... Ariana Rued Deficient  20-30 ng/mL. ......... Ariana Rued Insufficient   ng/mL. ........ Ariana Rued Sufficient  >100 ng/mL. .......... Ariana Rued Toxic         PTH   Date Value Ref Range Status   06/20/2017 281 (H) 15 - 65 pg/mL Final       Recent Labs     05/19/21  1245 05/19/21  2125 05/20/21  0449   ALT 98* 116* 99*   * 102* 67*   ALKPHOS 133* 136* 117*   BILITOT 0.5 0.6 0.5   BILIDIR  --  <0.2 0.2       Recent Labs     05/19/21  1245 05/19/21 2125 05/20/21  0449   LABALBU 4.2 4.0 3.6       Ferritin   Date Value Ref Range Status   11/09/2017 334 ng/mL Final     Comment:     FERRITIN Reference Ranges:  Adult Males   20 - 60 yrars:    30 - 400 ng/mL  Adult females 17 - 60 years:    13 - 150 ng/mL  Adults greater than 60 years:   no established reference range  Pediatrics:                     no established reference range       Iron   Date Value Ref Range Status   11/09/2017 33 (L) 37 - 145 mcg/dL Final     TIBC   Date Value Ref Range Status   11/09/2017 222 (L) 250 - 450 mcg/dL Final       Vitamin B-12   Date Value Ref Range Status   02/17/2017 1802 (H) 211 - 946 pg/mL Final       Folate   Date Value Ref Range Status   02/17/2017 >20.0 4.8 - 24.2 ng/mL Final         Lab Results   Component Value Date    COLORU Yellow 11/07/2017    NITRU Negative 11/07/2017    GLUCOSEU Negative 11/07/2017    GLUCOSEU NEGATIVE 08/27/2011    KETUA Negative 11/07/2017    UROBILINOGEN 0.2 11/07/2017    BILIRUBINUR Negative 11/07/2017    BILIRUBINUR NEGATIVE 08/27/2011       No results found for: ALVINO, CREURRAN, MACREATRATIO, OSMOU    No components found for: URIC    No results found for: LIPIDPAN      Assessment and Plan:    1. esrd  For hd today since didn't get full treatment yesterday and received contrast  Hd per her usual orders from [facility]    2. Sp VT arrest  Per cardiology  Undergoing stress test today  Known LAD lesion from 2012  On statin asa bb nitro    3. Anemia  jehovahs witness  Continue demarcus    4. Secondary hyperparathyroidism  Follow on renvela when taking po  p 4.5      Margie Drummond MD

## 2021-05-21 LAB
ALBUMIN SERPL-MCNC: 3.7 G/DL (ref 3.5–5.2)
ALP BLD-CCNC: 111 U/L (ref 35–104)
ALT SERPL-CCNC: 76 U/L (ref 0–32)
ANION GAP SERPL CALCULATED.3IONS-SCNC: 15 MMOL/L (ref 7–16)
APTT: 92.9 SEC (ref 24.5–35.1)
AST SERPL-CCNC: 45 U/L (ref 0–31)
BASOPHILS ABSOLUTE: 0.05 E9/L (ref 0–0.2)
BASOPHILS RELATIVE PERCENT: 0.6 % (ref 0–2)
BILIRUB SERPL-MCNC: 0.7 MG/DL (ref 0–1.2)
BILIRUBIN DIRECT: 0.3 MG/DL (ref 0–0.3)
BILIRUBIN, INDIRECT: 0.4 MG/DL (ref 0–1)
BUN BLDV-MCNC: 21 MG/DL (ref 6–23)
CALCIUM SERPL-MCNC: 9.1 MG/DL (ref 8.6–10.2)
CHLORIDE BLD-SCNC: 98 MMOL/L (ref 98–107)
CO2: 25 MMOL/L (ref 22–29)
CREAT SERPL-MCNC: 4.1 MG/DL (ref 0.5–1)
EKG ATRIAL RATE: 62 BPM
EKG P AXIS: 21 DEGREES
EKG P-R INTERVAL: 192 MS
EKG Q-T INTERVAL: 432 MS
EKG QRS DURATION: 94 MS
EKG QTC CALCULATION (BAZETT): 438 MS
EKG R AXIS: 91 DEGREES
EKG T AXIS: 128 DEGREES
EKG VENTRICULAR RATE: 62 BPM
EOSINOPHILS ABSOLUTE: 0.12 E9/L (ref 0.05–0.5)
EOSINOPHILS RELATIVE PERCENT: 1.5 % (ref 0–6)
GFR AFRICAN AMERICAN: 13
GFR NON-AFRICAN AMERICAN: 13 ML/MIN/1.73
GLUCOSE BLD-MCNC: 87 MG/DL (ref 74–99)
HCT VFR BLD CALC: 29.4 % (ref 34–48)
HCT VFR BLD CALC: 29.4 % (ref 34–48)
HEMOGLOBIN: 9 G/DL (ref 11.5–15.5)
HEMOGLOBIN: 9.1 G/DL (ref 11.5–15.5)
IMMATURE GRANULOCYTES #: 0.05 E9/L
IMMATURE GRANULOCYTES %: 0.6 % (ref 0–5)
LV EF: 30 %
LVEF MODALITY: NORMAL
LYMPHOCYTES ABSOLUTE: 1.01 E9/L (ref 1.5–4)
LYMPHOCYTES RELATIVE PERCENT: 12.2 % (ref 20–42)
MAGNESIUM: 2.1 MG/DL (ref 1.6–2.6)
MCH RBC QN AUTO: 28.2 PG (ref 26–35)
MCHC RBC AUTO-ENTMCNC: 31 % (ref 32–34.5)
MCV RBC AUTO: 91 FL (ref 80–99.9)
METER GLUCOSE: 103 MG/DL (ref 74–99)
METER GLUCOSE: 184 MG/DL (ref 74–99)
METER GLUCOSE: 93 MG/DL (ref 74–99)
MONOCYTES ABSOLUTE: 0.89 E9/L (ref 0.1–0.95)
MONOCYTES RELATIVE PERCENT: 10.8 % (ref 2–12)
NEUTROPHILS ABSOLUTE: 6.14 E9/L (ref 1.8–7.3)
NEUTROPHILS RELATIVE PERCENT: 74.3 % (ref 43–80)
PDW BLD-RTO: 16.7 FL (ref 11.5–15)
PHOSPHORUS: 4.7 MG/DL (ref 2.5–4.5)
PLATELET # BLD: 183 E9/L (ref 130–450)
PMV BLD AUTO: 11.4 FL (ref 7–12)
POC ACT LR: 319 SECONDS
POTASSIUM SERPL-SCNC: 4.1 MMOL/L (ref 3.5–5)
RBC # BLD: 3.23 E12/L (ref 3.5–5.5)
SODIUM BLD-SCNC: 138 MMOL/L (ref 132–146)
TOTAL PROTEIN: 6.1 G/DL (ref 6.4–8.3)
WBC # BLD: 8.3 E9/L (ref 4.5–11.5)

## 2021-05-21 PROCEDURE — 80076 HEPATIC FUNCTION PANEL: CPT

## 2021-05-21 PROCEDURE — 6370000000 HC RX 637 (ALT 250 FOR IP): Performed by: INTERNAL MEDICINE

## 2021-05-21 PROCEDURE — 85018 HEMOGLOBIN: CPT

## 2021-05-21 PROCEDURE — 02713ZZ DILATION OF CORONARY ARTERY, TWO ARTERIES, PERCUTANEOUS APPROACH: ICD-10-PCS | Performed by: INTERNAL MEDICINE

## 2021-05-21 PROCEDURE — 2500000003 HC RX 250 WO HCPCS

## 2021-05-21 PROCEDURE — 4A023N7 MEASUREMENT OF CARDIAC SAMPLING AND PRESSURE, LEFT HEART, PERCUTANEOUS APPROACH: ICD-10-PCS | Performed by: INTERNAL MEDICINE

## 2021-05-21 PROCEDURE — C1725 CATH, TRANSLUMIN NON-LASER: HCPCS

## 2021-05-21 PROCEDURE — 6360000002 HC RX W HCPCS: Performed by: INTERNAL MEDICINE

## 2021-05-21 PROCEDURE — 027034Z DILATION OF CORONARY ARTERY, ONE ARTERY WITH DRUG-ELUTING INTRALUMINAL DEVICE, PERCUTANEOUS APPROACH: ICD-10-PCS | Performed by: INTERNAL MEDICINE

## 2021-05-21 PROCEDURE — 99232 SBSQ HOSP IP/OBS MODERATE 35: CPT | Performed by: INTERNAL MEDICINE

## 2021-05-21 PROCEDURE — 6360000002 HC RX W HCPCS

## 2021-05-21 PROCEDURE — 93306 TTE W/DOPPLER COMPLETE: CPT

## 2021-05-21 PROCEDURE — B2111ZZ FLUOROSCOPY OF MULTIPLE CORONARY ARTERIES USING LOW OSMOLAR CONTRAST: ICD-10-PCS | Performed by: INTERNAL MEDICINE

## 2021-05-21 PROCEDURE — C1760 CLOSURE DEV, VASC: HCPCS

## 2021-05-21 PROCEDURE — 93458 L HRT ARTERY/VENTRICLE ANGIO: CPT | Performed by: INTERNAL MEDICINE

## 2021-05-21 PROCEDURE — 85347 COAGULATION TIME ACTIVATED: CPT

## 2021-05-21 PROCEDURE — 85014 HEMATOCRIT: CPT

## 2021-05-21 PROCEDURE — 99233 SBSQ HOSP IP/OBS HIGH 50: CPT | Performed by: INTERNAL MEDICINE

## 2021-05-21 PROCEDURE — 92928 PRQ TCAT PLMT NTRAC ST 1 LES: CPT | Performed by: INTERNAL MEDICINE

## 2021-05-21 PROCEDURE — C1874 STENT, COATED/COV W/DEL SYS: HCPCS

## 2021-05-21 PROCEDURE — 2709999900 HC NON-CHARGEABLE SUPPLY

## 2021-05-21 PROCEDURE — B41F1ZZ FLUOROSCOPY OF RIGHT LOWER EXTREMITY ARTERIES USING LOW OSMOLAR CONTRAST: ICD-10-PCS | Performed by: INTERNAL MEDICINE

## 2021-05-21 PROCEDURE — 90935 HEMODIALYSIS ONE EVALUATION: CPT

## 2021-05-21 PROCEDURE — 80048 BASIC METABOLIC PNL TOTAL CA: CPT

## 2021-05-21 PROCEDURE — 6370000000 HC RX 637 (ALT 250 FOR IP)

## 2021-05-21 PROCEDURE — 82962 GLUCOSE BLOOD TEST: CPT

## 2021-05-21 PROCEDURE — 93005 ELECTROCARDIOGRAM TRACING: CPT | Performed by: INTERNAL MEDICINE

## 2021-05-21 PROCEDURE — C9113 INJ PANTOPRAZOLE SODIUM, VIA: HCPCS | Performed by: INTERNAL MEDICINE

## 2021-05-21 PROCEDURE — 2580000003 HC RX 258: Performed by: INTERNAL MEDICINE

## 2021-05-21 PROCEDURE — 85025 COMPLETE CBC W/AUTO DIFF WBC: CPT

## 2021-05-21 PROCEDURE — C1887 CATHETER, GUIDING: HCPCS

## 2021-05-21 PROCEDURE — C1894 INTRO/SHEATH, NON-LASER: HCPCS

## 2021-05-21 PROCEDURE — 83735 ASSAY OF MAGNESIUM: CPT

## 2021-05-21 PROCEDURE — 84100 ASSAY OF PHOSPHORUS: CPT

## 2021-05-21 PROCEDURE — C1769 GUIDE WIRE: HCPCS

## 2021-05-21 PROCEDURE — 2700000000 HC OXYGEN THERAPY PER DAY

## 2021-05-21 PROCEDURE — 85730 THROMBOPLASTIN TIME PARTIAL: CPT

## 2021-05-21 PROCEDURE — 93010 ELECTROCARDIOGRAM REPORT: CPT | Performed by: INTERNAL MEDICINE

## 2021-05-21 PROCEDURE — 2000000000 HC ICU R&B

## 2021-05-21 PROCEDURE — C9600 PERC DRUG-EL COR STENT SING: HCPCS | Performed by: INTERNAL MEDICINE

## 2021-05-21 RX ORDER — FAMOTIDINE 20 MG/1
10 TABLET, FILM COATED ORAL DAILY
Status: DISCONTINUED | OUTPATIENT
Start: 2021-05-22 | End: 2021-05-25 | Stop reason: HOSPADM

## 2021-05-21 RX ORDER — NITROGLYCERIN 20 MG/100ML
INJECTION INTRAVENOUS
Status: COMPLETED
Start: 2021-05-21 | End: 2021-05-21

## 2021-05-21 RX ORDER — SODIUM CHLORIDE 0.9 % (FLUSH) 0.9 %
5-40 SYRINGE (ML) INJECTION EVERY 12 HOURS SCHEDULED
Status: DISCONTINUED | OUTPATIENT
Start: 2021-05-21 | End: 2021-05-25 | Stop reason: HOSPADM

## 2021-05-21 RX ORDER — SODIUM CHLORIDE 0.9 % (FLUSH) 0.9 %
5-40 SYRINGE (ML) INJECTION PRN
Status: DISCONTINUED | OUTPATIENT
Start: 2021-05-21 | End: 2021-05-25 | Stop reason: HOSPADM

## 2021-05-21 RX ORDER — POLYETHYLENE GLYCOL 3350 17 G/17G
17 POWDER, FOR SOLUTION ORAL DAILY PRN
Status: DISCONTINUED | OUTPATIENT
Start: 2021-05-21 | End: 2021-05-25 | Stop reason: HOSPADM

## 2021-05-21 RX ORDER — HEPARIN SODIUM 10000 [USP'U]/ML
5000 INJECTION, SOLUTION INTRAVENOUS; SUBCUTANEOUS EVERY 8 HOURS SCHEDULED
Status: DISCONTINUED | OUTPATIENT
Start: 2021-05-21 | End: 2021-05-25 | Stop reason: HOSPADM

## 2021-05-21 RX ORDER — NITROGLYCERIN 20 MG/100ML
5-200 INJECTION INTRAVENOUS CONTINUOUS
Status: DISCONTINUED | OUTPATIENT
Start: 2021-05-21 | End: 2021-05-21

## 2021-05-21 RX ORDER — ISOSORBIDE MONONITRATE 30 MG/1
30 TABLET, EXTENDED RELEASE ORAL DAILY
Status: DISCONTINUED | OUTPATIENT
Start: 2021-05-21 | End: 2021-05-25 | Stop reason: HOSPADM

## 2021-05-21 RX ORDER — METOPROLOL SUCCINATE 50 MG/1
25 TABLET, EXTENDED RELEASE ORAL 2 TIMES DAILY
Status: DISCONTINUED | OUTPATIENT
Start: 2021-05-21 | End: 2021-05-25 | Stop reason: HOSPADM

## 2021-05-21 RX ORDER — HYDRALAZINE HYDROCHLORIDE 10 MG/1
10 TABLET, FILM COATED ORAL EVERY 8 HOURS SCHEDULED
Status: DISCONTINUED | OUTPATIENT
Start: 2021-05-21 | End: 2021-05-25 | Stop reason: HOSPADM

## 2021-05-21 RX ORDER — SODIUM CHLORIDE 9 MG/ML
25 INJECTION, SOLUTION INTRAVENOUS PRN
Status: DISCONTINUED | OUTPATIENT
Start: 2021-05-21 | End: 2021-05-25 | Stop reason: HOSPADM

## 2021-05-21 RX ORDER — MORPHINE SULFATE 2 MG/ML
2 INJECTION, SOLUTION INTRAMUSCULAR; INTRAVENOUS
Status: DISCONTINUED | OUTPATIENT
Start: 2021-05-21 | End: 2021-05-24

## 2021-05-21 RX ORDER — MORPHINE SULFATE 2 MG/ML
2 INJECTION, SOLUTION INTRAMUSCULAR; INTRAVENOUS
Status: DISCONTINUED | OUTPATIENT
Start: 2021-05-21 | End: 2021-05-21

## 2021-05-21 RX ADMIN — METOPROLOL TARTRATE 25 MG: 25 TABLET, FILM COATED ORAL at 08:21

## 2021-05-21 RX ADMIN — METOPROLOL SUCCINATE 25 MG: 50 TABLET, EXTENDED RELEASE ORAL at 20:57

## 2021-05-21 RX ADMIN — NITROGLYCERIN 5 MCG/MIN: 20 INJECTION INTRAVENOUS at 02:04

## 2021-05-21 RX ADMIN — ASPIRIN 81 MG: 81 TABLET, COATED ORAL at 08:21

## 2021-05-21 RX ADMIN — HEPARIN SODIUM 5000 UNITS: 10000 INJECTION INTRAVENOUS; SUBCUTANEOUS at 16:01

## 2021-05-21 RX ADMIN — HYDRALAZINE HYDROCHLORIDE 10 MG: 10 TABLET, FILM COATED ORAL at 16:01

## 2021-05-21 RX ADMIN — ACETAMINOPHEN 650 MG: 325 TABLET ORAL at 19:47

## 2021-05-21 RX ADMIN — Medication 10 ML: at 20:57

## 2021-05-21 RX ADMIN — MORPHINE SULFATE 2 MG: 2 INJECTION, SOLUTION INTRAMUSCULAR; INTRAVENOUS at 03:09

## 2021-05-21 RX ADMIN — SODIUM CHLORIDE, PRESERVATIVE FREE 10 ML: 5 INJECTION INTRAVENOUS at 08:10

## 2021-05-21 RX ADMIN — ATORVASTATIN CALCIUM 40 MG: 40 TABLET, FILM COATED ORAL at 21:00

## 2021-05-21 RX ADMIN — TICAGRELOR 90 MG: 90 TABLET ORAL at 16:02

## 2021-05-21 RX ADMIN — PANTOPRAZOLE SODIUM 40 MG: 40 INJECTION, POWDER, FOR SOLUTION INTRAVENOUS at 08:21

## 2021-05-21 RX ADMIN — MORPHINE SULFATE 2 MG: 2 INJECTION, SOLUTION INTRAMUSCULAR; INTRAVENOUS at 08:09

## 2021-05-21 RX ADMIN — ISOSORBIDE MONONITRATE 30 MG: 30 TABLET, EXTENDED RELEASE ORAL at 16:02

## 2021-05-21 RX ADMIN — HEPARIN SODIUM 5000 UNITS: 10000 INJECTION INTRAVENOUS; SUBCUTANEOUS at 20:55

## 2021-05-21 RX ADMIN — TICAGRELOR 90 MG: 90 TABLET ORAL at 20:56

## 2021-05-21 ASSESSMENT — PAIN DESCRIPTION - LOCATION
LOCATION: ARM
LOCATION: CHEST
LOCATION: CHEST

## 2021-05-21 ASSESSMENT — PAIN DESCRIPTION - DESCRIPTORS
DESCRIPTORS: SHARP
DESCRIPTORS: DISCOMFORT

## 2021-05-21 ASSESSMENT — PAIN DESCRIPTION - PROGRESSION
CLINICAL_PROGRESSION: GRADUALLY WORSENING
CLINICAL_PROGRESSION: GRADUALLY WORSENING

## 2021-05-21 ASSESSMENT — PAIN DESCRIPTION - PAIN TYPE
TYPE: ACUTE PAIN

## 2021-05-21 ASSESSMENT — PAIN DESCRIPTION - FREQUENCY
FREQUENCY: CONTINUOUS
FREQUENCY: INTERMITTENT

## 2021-05-21 ASSESSMENT — PAIN DESCRIPTION - ONSET
ONSET: ON-GOING
ONSET: AWAKENED FROM SLEEP
ONSET: ON-GOING

## 2021-05-21 ASSESSMENT — PAIN SCALES - GENERAL
PAINLEVEL_OUTOF10: 3
PAINLEVEL_OUTOF10: 5
PAINLEVEL_OUTOF10: 1
PAINLEVEL_OUTOF10: 0
PAINLEVEL_OUTOF10: 6

## 2021-05-21 ASSESSMENT — PAIN DESCRIPTION - ORIENTATION: ORIENTATION: MID;LEFT

## 2021-05-21 ASSESSMENT — PAIN - FUNCTIONAL ASSESSMENT
PAIN_FUNCTIONAL_ASSESSMENT: PREVENTS OR INTERFERES SOME ACTIVE ACTIVITIES AND ADLS
PAIN_FUNCTIONAL_ASSESSMENT: PREVENTS OR INTERFERES SOME ACTIVE ACTIVITIES AND ADLS

## 2021-05-21 NOTE — PROGRESS NOTES
Inpatient Cardiology Progress note     PATIENT IS BEING FOLLOWED FOR: post cardiac arrest    Raman Landers is a 73 y.o. AAF known to Dr Marco Antonio Cullen: Complains of localized area of L parasternal discomfort which was reproducible with palpation. Started on IV NTG after midnight for same pain. States that pain improved after she was given aspirin. Denied other types of CP. Denies SOB, palpations or lightheadedness. OBJECTIVE: No apparent distress     ROS:  Consist: Denies fevers, chills or night sweats  Heart: Denies chest pain, palpitations, lightheadedness, dizziness or syncope  Lungs: Denies SOB, cough, wheezing, orthopnea or PND  GI: Denies abdominal pain, vomiting or diarrhea    PHYSICAL EXAM:   BP (!) 114/45   Pulse 64   Temp 97.8 °F (36.6 °C) (Temporal)   Resp 15   Ht 5' 10\" (1.778 m)   Wt 198 lb 6.6 oz (90 kg)   SpO2 90%   BMI 28.47 kg/m²    B/P Range last 24 hours: Systolic (84VKF), EYJ:223 , Min:101 , OEI:983    Diastolic (33FOP), HSC:06, Min:37, Max:88  CONST:  Well developed, AAF who appears older than her stated age. Awake and alert. No apparent distress. HEENT:   Head- Normocephalic, atraumatic   Eyes- Conjunctivae pink, anicteric  Throat- Oral mucosa pink and moist  Neck-  Thick. No stridor, trachea midline, no jugular venous distention. No carotid bruit. .    CHEST: Chest symmetrical. No accessory muscle use or intercostal retractions. R chest wall infusa-port. Tender localized left parasternal area  RESPIRATORY: Lung sounds - diminished in the bases secondary to poor inspiratory effort, on NC O2  CARDIOVASCULAR:     Heart Ausculation- Regular rate and rhythm, no murmur heard. PV: RUE lymphedema. No lower extremity edema. No varicosities. Pedal pulses palpable, no clubbing or cyanosis   ABDOMEN: Soft, no grimacing to light palpation. Bowel sounds present. No palpable masses; no abdominal bruit  MS: Good muscle strength and tone. No atrophy or abnormal movements.    : TRIG 106 05/19/2021     LIVER PROFILE:  Recent Labs     05/20/21  0449 05/21/21  0401   AST 67* 45*   ALT 99* 76*   LABALBU 3.6 3.7      CT Head 5/19/2021: No acute intracranial abnormality     CT Brain perfusion 5/19/2021: No significant ischemic penumbra identified      CTA Head/Neck W 5/19/221: Mild atherosclerotic disease . No large vessel occlusion   Identified. Estimated stenosis of the proximal right and left internal carotid   artery by NASCET criteria is not hemodynamically significan    Telemetry: SR    Lexiscan MPS 5/20/21 Levar Chan MD, [facility] ):   The myocardial perfusion imaging was abnormal. The abnormality was a large fixed defect in the apical and septal walls; small fixed defect in the inferoapical wall; partially reversible defect in the small area of anterolateral wall.   Overall left ventricular systolic function was severely reduced, EF  25% with apical septal akinesis, moderate global hypokinesis. Dilated  left ventricle during stress and rest.   High risk myocardial perfusion study   Compared to previous study from August 2011 which showed large lateral  wall ischemia with EF 66%. ASSESSMENT:     1. Episode of unresponsiveness with reported shockable rhythm by AED s/p defibrillation x 1  2. Reported VT in ED 5/19/2021. No recurrence. On Lidocaine gtt         3. AMS, confusion improved/resolved  4. Elevated troponin (more than baseline), pattern not consistent with ACS  5. No acute ischemic EKG changes  6. Markedly abnormal stress test with severe LV systolic dysfunction  7. Known history of CAD, treated medically. 8. TTE 11/2020 at [facility] EF 60-65% with moderate CLVH and no reported valvular abnormalities  9. ESRD on HD through LUE AVF  10. Anemia with drop in H&H since admission but stable on repeat. 11. Elevated LFT's, trending down  12. HTN  13. HLD  14. Obesity  15. T2DM insulin requiring with neuropathy/retinopathy, nephropathy and R foot charcot  16.  R breast breast carcinoma s/p R masectomy s/p XRT. 17. RUE lymphedema  18. Bladder carcinoma s/p chemotherapy  19. Gait difficulty ambulates with walker  20. Jehovah Witness    PLAN:  1. Change Lopressor to Toprol XL  2. Change IV nitro to PO and add Hydralazine  3. Rest of cardiac medications same  4. Rest as per ICU team and other consultants  5. For Cath +/- PCI today  6.  Further recommendations to follow    Electronically signed by Elvia Ramos MD on 5/21/2021 at 8:58 AM

## 2021-05-21 NOTE — PLAN OF CARE
Problem: Skin Integrity:  Goal: Will show no infection signs and symptoms  Description: Will show no infection signs and symptoms  5/21/2021 1815 by Chung Smith RN  Outcome: Met This Shift  5/21/2021 0840 by Chung Smith RN  Outcome: Met This Shift  Goal: Absence of new skin breakdown  Description: Absence of new skin breakdown  5/21/2021 1815 by Chung Smith RN  Outcome: Met This Shift  5/21/2021 0840 by Chung Smith RN  Outcome: Met This Shift     Problem: Falls - Risk of:  Goal: Will remain free from falls  Description: Will remain free from falls  5/21/2021 1815 by Chung Smith RN  Outcome: Met This Shift  5/21/2021 0840 by Chung Smith RN  Outcome: Met This Shift  Goal: Absence of physical injury  Description: Absence of physical injury  5/21/2021 1815 by Chung Smith RN  Outcome: Met This Shift  5/21/2021 0840 by Chung Smith RN  Outcome: Met This Shift     Problem: Pain:  Goal: Pain level will decrease  Description: Pain level will decrease  Outcome: Met This Shift     Problem: Inadequate oral food/beverage intake (NI-2.1)  Goal: Food and/or Nutrient Delivery  Description: Individualized approach for food/nutrient provision.   5/21/2021 1520 by Abel Willard RD, LD  Outcome: Met This Shift

## 2021-05-21 NOTE — PROGRESS NOTES
to monitor while inpatient    Goals:  Pt to consume >75% meals/ONS       Nutrition Monitoring and Evaluation:   Food/Nutrient Intake Outcomes:  Food and Nutrient Intake, Supplement Intake  Physical Signs/Symptoms Outcomes:  Biochemical Data, Nutrition Focused Physical Findings, Skin, Weight, GI Status, Fluid Status or Edema, Hemodynamic Status     Discharge Planning:     Too soon to determine     Electronically signed by Ofelia Mobley RD, LD on 5/21/21 at 3:21 PM EDT    Contact: Ext [phone]

## 2021-05-21 NOTE — PROCEDURES
[address]                  [address] [facility],  [facility]                            CARDIAC CATHETERIZATION    PATIENT NAME: Slava Vásquez                    :        1955  MED REC NO:   [id number]                            ROOM:       0  ACCOUNT NO:   [de-identified]                           ADMIT DATE: 2021  PROVIDER:     Mendoza Hoskins MD    DATE OF PROCEDURE:  2021    CARDIAC CATHETERIZATION AND ANGIOPLASTY REPORT    PROCEDURES:  Left heart catheterization, selective coronary angiography,  balloon angioplasty with deployment of drug-eluting coronary stent to  the mid LAD and balloon angioplasty with deployment of drug-eluting  coronary stent to the mid RCA with dilatation of the stent in the mid  RCA with a larger noncompliant balloon to high pressure. Right femoral artery angiography and closure of the access site with  Angio-Seal device. INDICATION:  Cardiac arrest.  Non-ST-elevation myocardial infarction. Severe LV dysfunction. High-risk stress test.    PRESSURES:  1. Aorta 115/53 with a mean of 76.  2.  Left ventricular systolic pressure 447. Left ventricular  end-diastolic pressure 32.  3.  There was no significant gradient across the aortic valve. CORONARY ANGIOGRAPHY:  LEFT MAIN:  The left main artery has an eccentric 20% luminal narrowing  in its middle segment. LAD:  The left anterior descending artery is heavily calcified in its  proximal and middle segment. There is around 50% proximal LAD disease. After a trivial diagonal branch, the LAD has 95% stenosis. There,  however, was LATOSHA-3 flow in the LAD. The diagonal branch itself again  is a trivial vessel and a small caliber vessel and had around 90%  proximal disease. LCX:  The left circumflex is a relatively small and nondominant vessel.    The first marginal branch is a long, but small-caliber vessel which had  luminal irregularities, but without any significant angiographic luminal  narrowing. The left circumflex after the first marginal branch is  occluded. There was late opacification of a larger second marginal  branch. The occlusion of the left circumflex after the first marginal  branch is a chronic total occlusion. RCA:  The right coronary artery is a large, dominant vessel. It is  heavily calcified along its course. There was mild ostial RCA disease. There was a diffusely diseased segment extending from the proximal to  the mid vessel with 90% mid vessel stenosis. The distal RCA at the  level of the crux had around 50% luminal narrowing. There was around  70% ostial narrowing of the posterior descending artery branch. INTERVENTIONAL PROCEDURE:  EQUIPMENTS:  1.  6-Guyanese JL4 Launcher guide catheter. 2.  0.014/300 ChoICE Extra Support J-exchange wire. 3.  2.5 mm x 15 mm [place] Monorail balloon. 4.  2.5 mm x 15 mm Estuardo Resolute drug-eluting coronary stent. 5.  6-Guyanese JR4 Launcher Medtronic guide catheter with side holes. 6.  2.5 mm x 20 mm Monorail noncompliant balloon. 7.  2.5 mm x 26 mm Medtronic [place] Resolute drug-eluting coronary stent. 8.  2.75 mm x 20 mm Quantum [place] Monorail noncompliant balloon. 9.  5.5 GuideLiner catheter. TECHNIQUE:  The procedure was done through right femoral artery approach  under ultrasound guidance. The patient was given 5000 units of  intravenous heparin prior to the interventional procedure. An ACT was  319 after giving the heparin and no more heparin was given during the  procedure. She was given 180 mg of crushed Brilinta to swallow at the  beginning of the interventional procedure. Intravenous fentanyl was  given for back pain. Intervention on the LAD was first performed using the 6-Guyanese JR4 guide  catheter. The extra support exchange wire was successfully advanced  through the mid LAD high-grade stenosis and further distally into the  vessel.   The site of stenosis was dilated with a 2.5 mm x 15 mm balloon  inflated to 14 atmospheres. This was followed by advancing a 2.5 mm x  15 mm Estuardo Resolute drug-eluting coronary stent which was deployed at  the site at 14 atmospheres. Contrast injection after the deployment of  the stent revealed very good results without any significant residual  stenosis with no evidence of dissections or flaps and with LATOSHA-3 flow  in the vessel. Intervention on the right coronary artery was then performed. The right  coronary artery was selectively engaged with 6-Tongan JR4 guide catheter  with side holes. The same wire was used to cross the high-grade disease  in the mid RCA and the wire was advanced further distally into the  posterolateral branch. The 2.5 mm x 15 mm compliant balloon was then  advanced to the site of mid RCA stenosis and inflating the balloon to 14  atmospheres resulted in the rupture of the balloon. This was exchanged  to a 2.5 mm x 15 mm noncompliant balloon which was inflated at the site  to 16 atmospheres. This was followed by advancing a 2.5 mm x 26 mm Estuardo  Resolute drug-eluting coronary stent through a GuideLiner to the  intended position and this stent was deployed at 15 atmospheres. This  stent was then postdilated with a 2.75 mm x 20 mm noncompliant balloon  inflated along the length of the stent to 18 atmospheres. Contrast  injection at the end of the intervention revealed good results in the  RCA with around 20% residual stenosis within the middle segment of the  stent with no evidence of dissections or flaps and with LATOSHA-3 flow in  the vessel. CONCLUSION:  1. Coronary artery disease:  a. Left main:  20% eccentric mid vessel narrowing.  b.  LAD:  50% proximal vessel narrowing and 95% mid vessel stenosis. Trivial diagonal branch with 90% stenosis. c.  LCX:  Occluded after a small caliber first marginal branch which is  a chronic total occlusion. The second larger marginal branch filled  late.   d.  RCA:  Large, dominant vessel with 90% heavily calcified mid vessel  stenosis. 50% disease at the level of the crux and 70% ostial stenosis  of the posterior descending artery branch. 2.  Markedly elevated left ventricular end-diastolic pressure. 3.  Successful balloon angioplasty with deployment of drug-eluting  coronary stent to the mid LAD with very good results. 4.  Successful balloon angioplasty with deployment of drug-eluting  coronary stent to the mid RCA with poststent deployment dilatation with  a larger high-pressure noncompliant balloon with good results.         Deyvi Bonds MD    D: 05/21/2021 11:13:58       T: 05/21/2021 11:23:32     [email or web address]  Job#: [id number]     Doc#: [id number]    CC:

## 2021-05-21 NOTE — PROGRESS NOTES
[facility]  Internal Medicine Residency / [address]    Attending Physician Statement  I have discussed the case, including pertinent history and exam findings with the resident and the team.  I have seen and examined the patient and the key elements of the encounter have been performed by me. I have also personally reviewed imaging studies and labs. I agree with the assessment, plan and orders as documented by the resident. Has chest pain last night requiring nitro gtt and proceeded to cath this morning. Patient assessed in micu after procedure. She is doing well, denies chest pain, shortness of breath, leg pain. Femoral site is clean and bandaged, no LE tenderness, distal pulses are good. Assessment:  1. CAD s/p PCI (LAD, RCA) 5/21/21  2. VT arrest likely from ischemia  3. Encephalopathy noted post arrest has resolved  4. ESRD on IHD      Plan:  DAPT, BB, statin  MRI brain pending. Patient has spinal cord stimulator, need to check it is compatible. HD per Nephrology          Remainder of medical problems as per resident note. Marlena Cobian MD  Internal Medicine Residency Faculty

## 2021-05-21 NOTE — PROGRESS NOTES
[facility]  Internal Medicine Residency Program  Progress Note - House Team 2    Patient:  Clair Cantu 73 y.o. female MRN: [id number]     Date of Service: 5/21/2021     CC: Vtach/Vfib arrest x2   Overnight events:  Ovn, pt c/o 8/10 chest pain responsive to SL Nitroglycerin x1. Nitro gtt and PRN Morphine started. Cardiology notified and plan for cath lab early this morning. Subjective   Clair Cantu is a 73 y.o. female with PMH of HTN, T2DM, CAD w/ cath showing 90% stenosis of L circumflex, ESRD on HD, R breast cancer s/p mastectomy w/ residual lymphedema, and HFpEF (EF 60-65%), who presented to the ED with cardiac arrest.    On day 2 of hospital admission   Patient was seen at bed side in the am.   Seen this AM after cardiac cath. They are awake alert and respond to questions appropriately. Chest pain reduced in severity. Denies dyspnea/cough, abdominal pain. Objective     Physical Exam:  Vitals: BP (!) 114/45   Pulse 59   Temp 97.8 °F (36.6 °C) (Temporal)   Resp 20   Ht 5' 10\" (1.778 m)   Wt 198 lb 6.6 oz (90 kg)   SpO2 100%   BMI 28.47 kg/m²       Constitutional: Alert, A&Ox3, Follows commands. In no apparent distress. Head: Normocephalic and atraumatic. Eyes: Conjunctiva normal, (-) scleral icterus. Mucus membranes moist.  Mouth: Mucus membranes moist. Oropharynx clear. No deviation of the tongue or uvula. Neck: (-)  swelling, trachea midline  Respiratory: NC in place. Lungs clear to auscultation bilaterally. (-)  wheezes, (-)  rales, (-)  Rhonchi. No increased work of breathing or respiratory distress. Cardiovascular: RRR. (-) murmurs, (-) gallops, (-) rubs. S1 and S2 were normal. L AV fistula present, R-sided mediport in place. L femoral CVC in place. GI:  Abdomen soft, non-tender, non-distended. (+) BS. (-) guarding, (-) rigidity. Extremities: R-sided cath site covered with a clean, dry bandage. Compartments soft and no hematoma detected. Pulses intact. Warm and well perfused. (-) clubbing, (-) cyanosis. (-) peripheral edema. (-) sacral pitting edema. Neurologic: A&O x3. No focal neurological deficits.  No speech slurring or tremor    Pertinent Labs & Imaging Studies   aleks  CBC with Differential:    Lab Results   Component Value Date    WBC 8.3 05/21/2021    RBC 3.23 05/21/2021    HGB 9.1 05/21/2021    HCT 29.4 05/21/2021     05/21/2021    MCV 91.0 05/21/2021    MCH 28.2 05/21/2021    MCHC 31.0 05/21/2021    RDW 16.7 05/21/2021    NRBC 0.0 02/18/2017    SEGSPCT 70 03/12/2014    BANDSPCT 1 02/18/2015    LYMPHOPCT 12.2 05/21/2021    PROMYELOPCT 1.8 02/18/2017    MONOPCT 10.8 05/21/2021    MYELOPCT 0.9 10/24/2017    BASOPCT 0.6 05/21/2021    MONOSABS 0.89 05/21/2021    LYMPHSABS 1.01 05/21/2021    EOSABS 0.12 05/21/2021    BASOSABS 0.05 05/21/2021     BMP:    Lab Results   Component Value Date     05/21/2021    K 4.1 05/21/2021    K 3.9 05/19/2021    CL 98 05/21/2021    CO2 25 05/21/2021    BUN 21 05/21/2021    LABALBU 3.7 05/21/2021    LABALBU 3.8 04/02/2012    CREATININE 4.1 05/21/2021    CALCIUM 9.1 05/21/2021    GFRAA 13 05/21/2021    LABGLOM 13 05/21/2021    GLUCOSE 87 05/21/2021    GLUCOSE 46 04/02/2012     Hepatic Function Panel:    Lab Results   Component Value Date    ALKPHOS 111 05/21/2021    ALT 76 05/21/2021    AST 45 05/21/2021    PROT 6.1 05/21/2021    BILITOT 0.7 05/21/2021    BILIDIR 0.3 05/21/2021    IBILI 0.4 05/21/2021    LABALBU 3.7 05/21/2021    LABALBU 3.8 04/02/2012     Magnesium:    Lab Results   Component Value Date    MG 2.1 05/21/2021     Phosphorus:    Lab Results   Component Value Date    PHOS 4.7 05/21/2021     PTT:    Lab Results   Component Value Date    APTT 92.9 05/21/2021     Last 3 Troponin:    Lab Results   Component Value Date    TROPONINI 0.83 05/20/2021    TROPONINI 0.81 05/20/2021    TROPONINI 0.82 05/19/2021     Resident's Assessment and Plan     Assessment     Vtach/Vfib arrest, r/o ischemic cause in setting of multiple cardiac risk factors  Prior heart cath showing 90% stenosis of L circumflex artery - medically managed   Admission labs showed no severe electrolyte dyscrasias     CAD s/p stent to LAD and RCA     Acute encephalopathy in the setting of #1 - resolved   Pt initially demonstrating retrograde amnesia, short attention span - improved on exam this AM  Neurology to see - ordering MRI brain     Hx T2DM  Hgb A1c 6.6% as of 5/19/2021     Hx ESRD on HD    Plan     D/c Heparin per Cardiology - start DAPT   Continue BB, ASA, statin per Cardiology  Start Imdur 30mg PO daily   Check to see if pt is compatible for MRI since she has spinal stimulator in place  F/u Echo   Nephrology, Neurology, Cardiology following, appreciate further recs   Continue HD while inpatient  Hgb/Hct q12  Daily labs - replete electrolytes as necessary     PT/OT evaluation: Not indicated at this time  DVT prophylaxis/ GI prophylaxis: Heparin / Protonix   Disposition: Continue current medical management      Joy Wolfe DO, PGY-1  Attending physician: Dr. Laura Mcbride

## 2021-05-21 NOTE — CARE COORDINATION
5/21 Care Coordination:JOELLE spoke with Mariajose Salinasdameon in her room. PTA she is independent, lives alone,. She has HD [facility]F [facility], she takes a Taxi. She has a Brother locally that helps. Her sister is hr form [employer] to also help. Discharge plan is to return Home. She does not want any HHC at this time. JOELLE/AINSLEY will continue to follow for discharge planning.    Peggy IVERSON,RN--BC  [phone]

## 2021-05-21 NOTE — PROGRESS NOTES
Department of Internal Medicine  Nephrology Attending Progress Note    SUBJECTIVE:  We are following this patient for end-stage renal failure . 5/20: The pt is a 74 yo female who has a pmh of dm, htn, chronic back pain, R breast cancer, esrd on hd mwf, cad with 90% LAD lesion on cath 2012, HFpEF who was at dialysis yesterday and became unresponsive and pulseless. aed was placed and she was in vt and was shocked. She was brought to the er and went into vt again and was placed on a lidocaine drip and heparin. She had RUE weakness and slurred speech but cerebral imaging was negative. According to family she had been having some cp last week. She has been hemodynamically stable. She is having a stress test today.      5/21 :pt seen in icu, sp cath today with pci to rca and lad    PROBLEM LIST:    Patient Active Problem List   Diagnosis    Diabetic retinopathy ([address]    Malignant neoplasm of right female breast ([address]    Atherosclerosis of native coronary artery of native heart without angina pectoris    Moderate obesity    Left ventricular hypertrophy    Herniated lumbar intervertebral disc    Lumbar degenerative disc disease    Pseudomeningocele    Lumbar radiculopathy    Lymphedema of arm    CKD (chronic kidney disease) stage 4, GFR 15-29 ml/min ([facility])    Insulin dependent type 2 diabetes mellitus (HCC)    Anemia of chronic disease    Chronic diastolic CHF (congestive heart failure) ([facility])    Neuropathy    Hypertension    Glaucoma    Refusal of blood product    Pancytopenia ([facility])    Controlled type 2 diabetes mellitus with chronic kidney disease on chronic dialysis, with long-term current use of insulin (HCC)    Vitreous hemorrhage ([address]    Patient is Baptism    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 ([address]    Hyperkalemia, diminished renal excretion    Chronic pain syndrome    Lumbar post-laminectomy syndrome    Myalgia    Cervicalgia    Diabetic hour   Intake 145 ml   Output 1810 ml   Net -1665 ml       Wt Readings from Last 3 Encounters:   05/20/21 198 lb 6.6 oz (90 kg)   05/19/21 189 lb (85.7 kg)   02/25/21 189 lb (85.7 kg)       Constitutional:  Patient in no acute distress  Head: normocephalic, atraumatic  Cardiovascular: regular rate and rhythm, no murmurs, gallops, or rubs  Respiratory:  Clear, no rales, rhochi, or wheezes  Gastrointestinal: soft, nontender, nondistended  Ext: no edema  Neuro: aaox3  Skin: dry, no rash      DATA:      Recent Labs     05/19/21 2125 05/20/21  0131 05/20/21 0449 05/21/21  0401   WBC 5.6  --  5.5 8.3   HGB 10.2* 9.3* 9.5* 9.1*   HCT 32.3* 30.8* 29.9* 29.4*   MCV 92.0  --  90.9 91.0     --  178 183     Recent Labs     05/19/21 2125 05/20/21 0449 05/21/21  0401    138 138   K 4.2 4.6 4.1   CL 96* 98 98   CO2 27 26 25   BUN 24* 28* 21   CREATININE 4.6* 5.0* 4.1*   LABGLOM 12 10 13   GLUCOSE 166* 109* 87   CALCIUM 8.9 8.8 9.1     Recent Labs     05/19/21 2125 05/20/21 0449 05/21/21  0401   * 99* 76*     Lab Results   Component Value Date    LABALBU 3.7 05/21/2021    LABALBU 3.6 05/20/2021    LABALBU 4.0 05/19/2021       Iron studies:  Lab Results   Component Value Date    FERRITIN 334 11/09/2017    IRON 33 (L) 11/09/2017    TIBC 222 (L) 11/09/2017     Vitamin B-12   Date Value Ref Range Status   02/17/2017 1802 (H) 211 - 946 pg/mL Final     Folate   Date Value Ref Range Status   02/17/2017 >20.0 4.8 - 24.2 ng/mL Final       Bone disease:  Lab Results   Component Value Date    MG 2.1 05/21/2021    PHOS 4.7 (H) 05/21/2021     Vit D, 25-Hydroxy   Date Value Ref Range Status   06/20/2017 26 (L) 30 - 100 ng/mL Final     Comment:     <20 ng/mL. ........... Jessica Brittle Deficient  20-30 ng/mL. ......... Jessica Brittle Insufficient   ng/mL. ........ Jessica Brittle Sufficient  >100 ng/mL. .......... Jessica Brittle Toxic       PTH   Date Value Ref Range Status   06/20/2017 281 (H) 15 - 65 pg/mL Final       No components found for: URIC    Lab Results   Component

## 2021-05-21 NOTE — PROGRESS NOTES
Dr. Zechariah Chew notified of chest pain mid chest 8 out of ten.  Patient going to cath lab, sister notified

## 2021-05-21 NOTE — PLAN OF CARE
Problem: Skin Integrity:  Goal: Will show no infection signs and symptoms  Description: Will show no infection signs and symptoms  5/21/2021 0840 by Robert Bob RN  Outcome: Met This Shift  5/21/2021 0031 by Ibeth Silva RN  Outcome: Met This Shift  Goal: Absence of new skin breakdown  Description: Absence of new skin breakdown  5/21/2021 0840 by Robert Bob RN  Outcome: Met This Shift  5/21/2021 0031 by Ibeth Silva RN  Outcome: Met This Shift     Problem: Falls - Risk of:  Goal: Will remain free from falls  Description: Will remain free from falls  5/21/2021 0840 by Robert Bob RN  Outcome: Met This Shift  5/21/2021 0031 by Ibeth Silva RN  Outcome: Met This Shift  Goal: Absence of physical injury  Description: Absence of physical injury  5/21/2021 0840 by Robert Bob RN  Outcome: Met This Shift  5/21/2021 0031 by Ibeth Silva RN  Outcome: Met This Shift     Problem: Pain:  Goal: Pain level will decrease  Description: Pain level will decrease  5/21/2021 0031 by Ibeth Silva RN  Outcome: Met This Shift  Goal: Control of acute pain  Description: Control of acute pain  5/21/2021 0031 by Ibeth Silva RN  Outcome: Met This Shift  Goal: Control of chronic pain  Description: Control of chronic pain  5/21/2021 0031 by Ibeth Silva RN  Outcome: Met This Shift

## 2021-05-21 NOTE — OP NOTE
Operative Note      Patient: Raman Landers  YOB: 1955  MRN: [id number]    Date of Procedure: 5/21/21      Indication:    1. S/p cardiac arrest  2. Catheterization: AUC score 9 . Indication 3.  3. PCI: AUC score 7 Indication 16. Procedure: Left Heart Catheterization, coronary angiography, percutaneous coronary intervention to LAD and RCA      Anesthesia: Fentanyl  Time sedation was administered: 09:43. I was present in the room when sedation was administered. Procedure end time: 10:43  Time spent with face to face monitoring of moderate sedation: 72 minutes    Cardiac cath performed via right femoral approach using 6F sheath. Findings:   Left main: 0% stenosis  LAD: 95 %   stenosis  Circumflex: 100%  Stenosis of OM (  ). RCA: Dominant. 90 %  stenosis. LV angio: not performed%  ejection fraction      Hemodynamics: Ao: 115/53 ( 76 )  LV: 118  LVEDP: 32    Interventional procedure:   1. PCI vessel: LAD. Lesion type: B. LATOSHA III flow pre PCI. Angioplasty performed with 2.5 mm balloon. Stenting performed with 2.5 mm MARYLOU. Result: 0% residual stenosis and LATOSHA III distal flow. 2. PCI vessel:  RCA Lesion type C  LATOSHA III flow pre PCI  Angioplasty performed with 2.5 mm balloon  Stent with 2.5 mm MARYLOU, Post dilated with 2.75 mm non compliant balloon  Result: 0% residual stenosis with LATOSHA III distal flow. Drugs: . Anticoagulation was maintained with IV Heparin . Brilinta 180 mg load given  in cath lab. Right femoral sheath removed and access site closed with angio seal. There was good distal pulses in the RLE at the end of the procedure. Complication: None   Blood loss: 20 cc  Contrast used: 85 cc      Post op diagnosis:  Multi vessel CAD  Successful PCI to LAD and RCA    Plan:  DAPT  Risk profile modification.    See orders          Electronically signed by Bertrum Kanner, MD on 5/21/2021 at 10:53 AM

## 2021-05-21 NOTE — PROGRESS NOTES
[address] [facility]   Department of Internal Medicine   Internal Medicine Residency  MICU Progress Note    Patient:  Pascale Garcia 73 y.o. female   MRN: [id number]       Date of Service: 2021    Allergy: Lasix [furosemide]  Follow up chest pain     Subjective     She reports sharp midsternal chest pain, 4/10 in intensity; stat EKG obtained and it did not reveal any significant ST-T wave changes; she has been taken for cardiac catheterization this morning  She developed chest pain after dialysis last night; pain did not improve with sublingual nitroglycerine and she was started on nitroglycerine drip  BP stable  S/p LAD, mid RCA MARYLOU stent placement for LAD 50% proximal and 95% mid stenosis, RCA 90% calcification ()    Objective     TEMPERATURE:  Current - Temp: 97.8 °F (36.6 °C); Max - Temp  Av.9 °F (36.6 °C)  Min: 97.5 °F (36.4 °C)  Max: 98.4 °F (36.9 °C)  RESPIRATIONS RANGE: Resp  Av  Min: 11  Max: 26  PULSE RANGE: Pulse  Av.9  Min: 61  Max: 82  BLOOD PRESSURE RANGE:  Systolic (26GMU), LCJ:707 , Min:101 , JHD:160   ; Diastolic (96YJT), PVE:43, Min:37, Max:88    PULSE OXIMETRY RANGE: SpO2  Av.4 %  Min: 92 %  Max: 100 %    I & O - 24hr:    Intake/Output Summary (Last 24 hours) at 2021 0720  Last data filed at 2021 0553  Gross per 24 hour   Intake 145 ml   Output 1815 ml   Net -1670 ml     I/O last 3 completed shifts: In: 145 [I.V.:145]  Out: 1815 [Urine:15] No intake/output data recorded.    Weight change: 9 lb 6.6 oz (4.27 kg)    Physical Examination:  General: alert, awake, in no acute distress  HEENT: NC, AT, moist oral mucosa  Neck: supple  Pulmonary: clear to auscultation bilaterally, no wheezing or crackles  CV: RRR, S1 S2 heard, no MRG  Abd: soft, nontender, nondistended, BS +  Ext: no pedal edema  Musculoskeletal: right shoulder pain; right hand weakness due to pain  Neuro: Alert, awake, no gross focal neurological deficit      Medications     Continuous Infusions:   nitroGLYCERIN 10 mcg/min (05/21/21 0311)    sodium chloride      heparin (PORCINE) Infusion 9.5 Units/kg/hr (05/21/21 0536)    dextrose       Scheduled Meds:   aspirin  81 mg Oral Daily    metoprolol tartrate  25 mg Oral BID    atorvastatin  40 mg Oral Nightly    pantoprazole  40 mg Intravenous Daily    And    sodium chloride (PF)  10 mL Intravenous Daily     PRN Meds: morphine, perflutren lipid microspheres, acetaminophen, sodium chloride flush, sodium chloride, heparin (porcine), heparin (porcine), glucose, dextrose, glucagon (rDNA), dextrose  Nutrition:   Cardiac diet  NPO after midnight    Labs and Imaging Studies     CBC:   Recent Labs     05/19/21 2125 05/20/21  0131 05/20/21 0449 05/21/21  0401   WBC 5.6  --  5.5 8.3   HGB 10.2* 9.3* 9.5* 9.1*   HCT 32.3* 30.8* 29.9* 29.4*   MCV 92.0  --  90.9 91.0     --  178 183       BMP:    Recent Labs     05/19/21 2125 05/20/21 0449 05/21/21  0401    138 138   K 4.2 4.6 4.1   CL 96* 98 98   CO2 27 26 25   BUN 24* 28* 21   CREATININE 4.6* 5.0* 4.1*   GLUCOSE 166* 109* 87       LIVER PROFILE:   Recent Labs     05/19/21 2125 05/20/21 0449 05/21/21  0401   * 67* 45*   * 99* 76*   BILIDIR <0.2 0.2 0.3   BILITOT 0.6 0.5 0.7   ALKPHOS 136* 117* 111*       PT/INR:   Recent Labs     05/19/21  1245   PROTIME 11.5   INR 1.1       APTT:   Recent Labs     05/20/21 0449 05/20/21  0806 05/21/21  0401   APTT 72.8* 76.8* 92.9*       Fasting Lipid Panel:    Lab Results   Component Value Date    CHOL 101 05/19/2021    TRIG 106 05/19/2021    HDL 38 05/19/2021       Cardiac Enzymes:    Lab Results   Component Value Date    CKTOTAL 58 05/20/2021    CKTOTAL 63 05/20/2021    CKTOTAL 67 05/19/2021    CKMB 2.7 05/20/2021    CKMB 3.5 05/20/2021    CKMB 4.3 05/19/2021    TROPONINI 0.83 (H) 05/20/2021    TROPONINI 0.81 (H) 05/20/2021    TROPONINI 0.82 (H) 05/19/2021       Notable Cultures:      Blood cultures   Blood Culture, Routine   Date Value Ref Range Status   11/15/2017 5 Days- no growth  Final     Respiratory cultures No results found for: RESPCULTURE   Gram Stain Result   Date Value Ref Range Status   10/12/2014 Refer to ordered Gram stain for results  Final     Urine   Urine Culture, Routine   Date Value Ref Range Status   11/07/2017 Growth not present  Final     Legionella No results found for: LABLEGI  C Diff PCR No results found for: CDIFPCR  Wound culture/abscess: No results for input(s): WNDABS in the last 72 hours. Tip culture:No results for input(s): CXCATHTIP in the last 72 hours. Antibiotic  Days  Day started                                Oxygen:     Vent Information  SpO2: 96 %  Additional Respiratory  Assessments  Pulse: 63  Resp: 17  SpO2: 96 %     Nasal cannula L/min     Face mask %     Reservoirs mask %       ABG   Vent Settings     PH   Mode      PCO2   TV      PO2   RR      HCO3   PS      Sat%   PEEP      FIO2   FIO2        P/F          Lines:  Site  Day  Date inserted     TLC              PICC              Arterial line              Peripheral line              HD cath            Urethral Catheter-Output (mL): 5 mL    Imaging Studies:    NM Cardiac Stress Test Nuclear Imaging   Final Result      The myocardial perfusion imaging was abnormal      The abnormality was a large fixed defect in the apical and septal   walls; small fixed defect in the inferoapical wall; partially   reversible defect in the small area of anterolateral wall. Overall left ventricular systolic function was severely reduced, EF   25% with apical septal akinesis, moderate global hypokinesis. Dilated   left ventricle during stress and rest.      High risk myocardial perfusion study      Compared to previous study from August 2011 which showed large lateral   wall ischemia with EF 66%. Mar Manley MD, [facility]                     XR CHEST PORTABLE   Final Result   Cardiomegaly with pulmonary vascular congestive changes.       Left-sided midnight  Aspiration/fall precautions    # Peptic ulcer prophylaxis: Pantoprazole  # DVT Prophylaxis: Heparin   # Disposition: Continue current care    Mercedes Verdin MD, PGY-2  Internal medicine resident  Attending Physician: Dr. Lelia Kingston personally saw, examined and provided care for the patient. Radiographs, labs and medication list were reviewed by me independently. I spoke with bedside nursing, therapists and consultants. Critical care services and times documented are independent of procedures and multidisciplinary rounds with Residents. Additionally comprehensive, multidisciplinary rounds were conducted with the MICU team. The case was discussed in detail and plans for care were established. Review of Residents documentation was conducted and revisions were made as appropriate. I agree with the above documented exam, problem list and plan of care.   Karlie Zapien MD,[facility]  Pulmonary&Critical Care Medicine   Director of [facility]  Medical Director of [address] [place] [address]    Sadi Lopes

## 2021-05-21 NOTE — FLOWSHEET NOTE
05/21/21 1515   Treatment   Time Off 1500   Vital Signs   Pulse 62   Resp 14   SpO2 100 %   During Hemodialysis Assessment   ABP (Arterial line BP) 141/52   Post-Hemodialysis Assessment   Post-Treatment Procedures Blood returned; Access bleeding time < 10 minutes   Machine Disinfection Process Acid/Vinegar Clean;Heat Disinfect; Exterior Machine Disinfection   Rinseback Volume (ml) 300 ml   Total Liters Processed (l/min) 62.1 l/min   Dialyzer Clearance Clear   Duration of Treatment (minutes) 180 minutes   Heparin amount administered during treatment (units) 0 units   Hemodialysis Intake (ml) 300 ml   Hemodialysis Output (ml) 300 ml   NET Removed (ml) 0 ml   Tolerated Treatment Good   Tolerated  3hr tx well on 3K 2.5Ca bath per orders,  no removed with out difficulty. Needles removed and pressure held until hemostasis achieved and dressing applied. Report to RN, patient remains in care of staff.

## 2021-05-21 NOTE — FLOWSHEET NOTE
05/20/21 2009   Vital Signs   /88   Temp 98 °F (36.7 °C)   Pulse 82   Resp 16   Weight 198 lb 6.6 oz (90 kg)   Weight Method Estimated   Percent Weight Change -1.24   Pain Assessment   Pain Assessment 0-10   Pain Level 0   Post-Hemodialysis Assessment   Post-Treatment Procedures Blood returned; Access bleeding time < 10 minutes   Machine Disinfection Process Exterior Machine Disinfection   Rinseback Volume (ml) 300 ml   Dialyzer Clearance Clear   Duration of Treatment (minutes) 240 minutes   Hemodialysis Output (ml) 1800 ml   NET Removed (ml) 1500 ml   Tolerated Treatment Good   Patient Response to Treatment tolerated well   Bilateral Breath Sounds Diminished   Edema Generalized

## 2021-05-22 LAB
ALBUMIN SERPL-MCNC: 3.5 G/DL (ref 3.5–5.2)
ALP BLD-CCNC: 108 U/L (ref 35–104)
ALT SERPL-CCNC: 62 U/L (ref 0–32)
ANION GAP SERPL CALCULATED.3IONS-SCNC: 16 MMOL/L (ref 7–16)
AST SERPL-CCNC: 28 U/L (ref 0–31)
BASOPHILS ABSOLUTE: 0.02 E9/L (ref 0–0.2)
BASOPHILS RELATIVE PERCENT: 0.2 % (ref 0–2)
BILIRUB SERPL-MCNC: 0.8 MG/DL (ref 0–1.2)
BILIRUBIN DIRECT: 0.3 MG/DL (ref 0–0.3)
BILIRUBIN, INDIRECT: 0.5 MG/DL (ref 0–1)
BUN BLDV-MCNC: 24 MG/DL (ref 6–23)
CALCIUM SERPL-MCNC: 9.1 MG/DL (ref 8.6–10.2)
CHLORIDE BLD-SCNC: 96 MMOL/L (ref 98–107)
CO2: 24 MMOL/L (ref 22–29)
CREAT SERPL-MCNC: 3.8 MG/DL (ref 0.5–1)
EKG ATRIAL RATE: 60 BPM
EKG ATRIAL RATE: 65 BPM
EKG P AXIS: 46 DEGREES
EKG P AXIS: 49 DEGREES
EKG P-R INTERVAL: 194 MS
EKG P-R INTERVAL: 206 MS
EKG Q-T INTERVAL: 422 MS
EKG Q-T INTERVAL: 444 MS
EKG QRS DURATION: 100 MS
EKG QRS DURATION: 98 MS
EKG QTC CALCULATION (BAZETT): 438 MS
EKG QTC CALCULATION (BAZETT): 444 MS
EKG R AXIS: 129 DEGREES
EKG R AXIS: 70 DEGREES
EKG T AXIS: 108 DEGREES
EKG T AXIS: 166 DEGREES
EKG VENTRICULAR RATE: 60 BPM
EKG VENTRICULAR RATE: 65 BPM
EOSINOPHILS ABSOLUTE: 0.01 E9/L (ref 0.05–0.5)
EOSINOPHILS RELATIVE PERCENT: 0.1 % (ref 0–6)
GFR AFRICAN AMERICAN: 14
GFR NON-AFRICAN AMERICAN: 14 ML/MIN/1.73
GLUCOSE BLD-MCNC: 141 MG/DL (ref 74–99)
HCT VFR BLD CALC: 29.9 % (ref 34–48)
HEMOGLOBIN: 9.3 G/DL (ref 11.5–15.5)
IMMATURE GRANULOCYTES #: 0.09 E9/L
IMMATURE GRANULOCYTES %: 0.8 % (ref 0–5)
LYMPHOCYTES ABSOLUTE: 0.83 E9/L (ref 1.5–4)
LYMPHOCYTES RELATIVE PERCENT: 7.3 % (ref 20–42)
MAGNESIUM: 2 MG/DL (ref 1.6–2.6)
MCH RBC QN AUTO: 27.9 PG (ref 26–35)
MCHC RBC AUTO-ENTMCNC: 31.1 % (ref 32–34.5)
MCV RBC AUTO: 89.8 FL (ref 80–99.9)
METER GLUCOSE: 145 MG/DL (ref 74–99)
METER GLUCOSE: 151 MG/DL (ref 74–99)
METER GLUCOSE: 190 MG/DL (ref 74–99)
MONOCYTES ABSOLUTE: 1.32 E9/L (ref 0.1–0.95)
MONOCYTES RELATIVE PERCENT: 11.6 % (ref 2–12)
NEUTROPHILS ABSOLUTE: 9.09 E9/L (ref 1.8–7.3)
NEUTROPHILS RELATIVE PERCENT: 80 % (ref 43–80)
PDW BLD-RTO: 17.2 FL (ref 11.5–15)
PHOSPHORUS: 4.9 MG/DL (ref 2.5–4.5)
PLATELET # BLD: 180 E9/L (ref 130–450)
PMV BLD AUTO: 11.3 FL (ref 7–12)
POTASSIUM SERPL-SCNC: 4.5 MMOL/L (ref 3.5–5)
RBC # BLD: 3.33 E12/L (ref 3.5–5.5)
SODIUM BLD-SCNC: 136 MMOL/L (ref 132–146)
TOTAL PROTEIN: 6.2 G/DL (ref 6.4–8.3)
WBC # BLD: 11.4 E9/L (ref 4.5–11.5)

## 2021-05-22 PROCEDURE — 80076 HEPATIC FUNCTION PANEL: CPT

## 2021-05-22 PROCEDURE — 93010 ELECTROCARDIOGRAM REPORT: CPT | Performed by: INTERNAL MEDICINE

## 2021-05-22 PROCEDURE — 82962 GLUCOSE BLOOD TEST: CPT

## 2021-05-22 PROCEDURE — 80048 BASIC METABOLIC PNL TOTAL CA: CPT

## 2021-05-22 PROCEDURE — 99232 SBSQ HOSP IP/OBS MODERATE 35: CPT | Performed by: INTERNAL MEDICINE

## 2021-05-22 PROCEDURE — 2000000000 HC ICU R&B

## 2021-05-22 PROCEDURE — 93005 ELECTROCARDIOGRAM TRACING: CPT | Performed by: INTERNAL MEDICINE

## 2021-05-22 PROCEDURE — 5A1D70Z PERFORMANCE OF URINARY FILTRATION, INTERMITTENT, LESS THAN 6 HOURS PER DAY: ICD-10-PCS | Performed by: INTERNAL MEDICINE

## 2021-05-22 PROCEDURE — 2580000003 HC RX 258: Performed by: INTERNAL MEDICINE

## 2021-05-22 PROCEDURE — 6370000000 HC RX 637 (ALT 250 FOR IP): Performed by: INTERNAL MEDICINE

## 2021-05-22 PROCEDURE — 84100 ASSAY OF PHOSPHORUS: CPT

## 2021-05-22 PROCEDURE — 2700000000 HC OXYGEN THERAPY PER DAY

## 2021-05-22 PROCEDURE — 83735 ASSAY OF MAGNESIUM: CPT

## 2021-05-22 PROCEDURE — 6360000002 HC RX W HCPCS: Performed by: INTERNAL MEDICINE

## 2021-05-22 PROCEDURE — 99233 SBSQ HOSP IP/OBS HIGH 50: CPT | Performed by: INTERNAL MEDICINE

## 2021-05-22 PROCEDURE — 85025 COMPLETE CBC W/AUTO DIFF WBC: CPT

## 2021-05-22 RX ORDER — 0.9 % SODIUM CHLORIDE 0.9 %
250 INTRAVENOUS SOLUTION INTRAVENOUS ONCE
Status: COMPLETED | OUTPATIENT
Start: 2021-05-22 | End: 2021-05-22

## 2021-05-22 RX ADMIN — ATORVASTATIN CALCIUM 40 MG: 40 TABLET, FILM COATED ORAL at 20:34

## 2021-05-22 RX ADMIN — TICAGRELOR 90 MG: 90 TABLET ORAL at 21:23

## 2021-05-22 RX ADMIN — METOPROLOL SUCCINATE 25 MG: 50 TABLET, EXTENDED RELEASE ORAL at 20:33

## 2021-05-22 RX ADMIN — SODIUM CHLORIDE 250 ML: 9 INJECTION, SOLUTION INTRAVENOUS at 09:59

## 2021-05-22 RX ADMIN — HEPARIN SODIUM 5000 UNITS: 10000 INJECTION INTRAVENOUS; SUBCUTANEOUS at 14:50

## 2021-05-22 RX ADMIN — ISOSORBIDE MONONITRATE 30 MG: 30 TABLET, EXTENDED RELEASE ORAL at 08:54

## 2021-05-22 RX ADMIN — METOPROLOL SUCCINATE 25 MG: 50 TABLET, EXTENDED RELEASE ORAL at 08:55

## 2021-05-22 RX ADMIN — ASPIRIN 81 MG: 81 TABLET, COATED ORAL at 09:03

## 2021-05-22 RX ADMIN — Medication 10 ML: at 08:56

## 2021-05-22 RX ADMIN — Medication 10 ML: at 20:35

## 2021-05-22 RX ADMIN — ACETAMINOPHEN 650 MG: 325 TABLET ORAL at 06:00

## 2021-05-22 RX ADMIN — FAMOTIDINE 10 MG: 20 TABLET ORAL at 08:55

## 2021-05-22 RX ADMIN — HEPARIN SODIUM 5000 UNITS: 10000 INJECTION INTRAVENOUS; SUBCUTANEOUS at 06:04

## 2021-05-22 RX ADMIN — HYDRALAZINE HYDROCHLORIDE 10 MG: 10 TABLET, FILM COATED ORAL at 22:22

## 2021-05-22 RX ADMIN — HYDRALAZINE HYDROCHLORIDE 10 MG: 10 TABLET, FILM COATED ORAL at 06:00

## 2021-05-22 RX ADMIN — ACETAMINOPHEN 650 MG: 325 TABLET ORAL at 20:33

## 2021-05-22 RX ADMIN — HEPARIN SODIUM 5000 UNITS: 10000 INJECTION INTRAVENOUS; SUBCUTANEOUS at 22:22

## 2021-05-22 RX ADMIN — TICAGRELOR 90 MG: 90 TABLET ORAL at 09:03

## 2021-05-22 ASSESSMENT — PAIN DESCRIPTION - ORIENTATION
ORIENTATION: RIGHT;LEFT
ORIENTATION: RIGHT;LEFT
ORIENTATION: LEFT;RIGHT

## 2021-05-22 ASSESSMENT — PAIN DESCRIPTION - PROGRESSION
CLINICAL_PROGRESSION: NOT CHANGED

## 2021-05-22 ASSESSMENT — PAIN DESCRIPTION - FREQUENCY
FREQUENCY: INTERMITTENT
FREQUENCY: INTERMITTENT

## 2021-05-22 ASSESSMENT — PAIN SCALES - GENERAL
PAINLEVEL_OUTOF10: 2
PAINLEVEL_OUTOF10: 4
PAINLEVEL_OUTOF10: 0
PAINLEVEL_OUTOF10: 4
PAINLEVEL_OUTOF10: 5
PAINLEVEL_OUTOF10: 5
PAINLEVEL_OUTOF10: 2

## 2021-05-22 ASSESSMENT — PAIN - FUNCTIONAL ASSESSMENT
PAIN_FUNCTIONAL_ASSESSMENT: ACTIVITIES ARE NOT PREVENTED
PAIN_FUNCTIONAL_ASSESSMENT: PREVENTS OR INTERFERES WITH ALL ACTIVE AND SOME PASSIVE ACTIVITIES

## 2021-05-22 ASSESSMENT — PAIN DESCRIPTION - LOCATION
LOCATION: CHEST

## 2021-05-22 ASSESSMENT — PAIN DESCRIPTION - DESCRIPTORS
DESCRIPTORS: DISCOMFORT

## 2021-05-22 ASSESSMENT — PAIN DESCRIPTION - PAIN TYPE
TYPE: ACUTE PAIN

## 2021-05-22 ASSESSMENT — PAIN DESCRIPTION - ONSET
ONSET: ON-GOING
ONSET: ON-GOING

## 2021-05-22 NOTE — PROGRESS NOTES
Department of Internal Medicine  Nephrology Attending Progress Note    SUBJECTIVE:  We are following this patient for end-stage renal failure . 5/20: The pt is a 72 yo female who has a pmh of dm, htn, chronic back pain, R breast cancer, esrd on hd mwf, cad with 90% LAD lesion on cath 2012, HFpEF who was at dialysis yesterday and became unresponsive and pulseless. aed was placed and she was in vt and was shocked. She was brought to the er and went into vt again and was placed on a lidocaine drip and heparin. She had RUE weakness and slurred speech but cerebral imaging was negative. According to family she had been having some cp last week. She has been hemodynamically stable. She is having a stress test today.      5/21 :pt seen in icu, sp cath today with pci to rca and lad    5/22: pt seen in icu, no cp or sob today    PROBLEM LIST:    Patient Active Problem List   Diagnosis    Diabetic retinopathy ([address]    Malignant neoplasm of right female breast ([address]    Atherosclerosis of native coronary artery of native heart without angina pectoris    Moderate obesity    Left ventricular hypertrophy    Herniated lumbar intervertebral disc    Lumbar degenerative disc disease    Pseudomeningocele    Lumbar radiculopathy    Lymphedema of arm    CKD (chronic kidney disease) stage 4, GFR 15-29 ml/min ([facility])    Insulin dependent type 2 diabetes mellitus ([facility])    Anemia of chronic disease    Chronic diastolic CHF (congestive heart failure) ([facility])    Neuropathy    Hypertension    Glaucoma    Refusal of blood product    Pancytopenia ([facility])    Controlled type 2 diabetes mellitus with chronic kidney disease on chronic dialysis, with long-term current use of insulin ([facility])    Vitreous hemorrhage ([address]    Patient is Hinduism    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 ([address]    Hyperkalemia, diminished renal excretion    Chronic pain syndrome    Lumbar post-laminectomy syndrome    Myalgia    Cervicalgia    Diabetic peripheral neuropathy (HCC)    ESRD (end stage renal disease) ([address]    Bilateral carpal tunnel syndrome    Spinal stenosis of lumbar region with neurogenic claudication    Cardiac arrest ([address]    ESRD (end stage renal disease) on dialysis ([address]    Mixed hyperlipidemia    Lymphedema of right upper extremity    Coronary artery disease involving native coronary artery of native heart without angina pectoris        PAST MEDICAL HISTORY:    Past Medical History:   Diagnosis Date    Acute infection of bone (HCC)     infection of rt foot, resolved.     Anemia of chronic disease     Breast cancer ([address]     right breast, 2008/ bladder, 2006- last chemotherapy \"years ago\"    CAD (coronary artery disease)     Carpal tunnel syndrome     bilat - for OR left hand 3-17-20     Chronic diastolic CHF (congestive heart failure) ([address] 09/23/2014 9/23/14- echocardiogram revealed moderate LV concentric hypertrophy, stage III diastolic dysfunction, mild tricuspid regurgitation    CKD (chronic kidney disease) stage 4, GFR 15-29 ml/min ([facility])     Diabetic retinopathy ([address]     Glaucoma     Hemodialysis patient ([address]     [facility]- Dr. Coni Marino - left arm fistula     Hyperkalemia, diminished renal excretion 11/9/2017    Hypertension     Hypoglycemia unawareness in type 1 diabetes mellitus ([address] 11/7/2017    Insulin dependent type 2 diabetes mellitus ([address]     Neuropathy     feet    Osteomyelitis due to secondary diabetes ([address]     rt great toe with amputation    Patient is Tenriism 11/7/2017    Refusal of blood product     patient states she dose not take blood transfusion    Ventricular hypertrophy     Vitreous hemorrhage (HCC)     left eye       MEDS (scheduled):   sodium chloride flush  5-40 mL Intravenous 2 times per day    metoprolol succinate  25 mg Oral BID    isosorbide mononitrate  30 mg Oral Daily    hydrALAZINE  10 mg Oral 3 times per day  heparin (porcine)  5,000 Units Subcutaneous 3 times per day    ticagrelor  90 mg Oral BID    famotidine  10 mg Oral Daily    aspirin  81 mg Oral Daily    atorvastatin  40 mg Oral Nightly       MEDS (infusions):   sodium chloride      sodium chloride      dextrose         MEDS (prn):  sodium chloride flush, sodium chloride, polyethylene glycol, morphine, perflutren lipid microspheres, acetaminophen, sodium chloride flush, sodium chloride, glucose, dextrose, glucagon (rDNA), dextrose    DIET:    DIET CARDIAC; Low Sodium (2 GM);  Cardiac, Renal      PHYSICAL EXAM:      Patient Vitals for the past 24 hrs:   BP Temp Temp src Pulse Resp SpO2 Height   05/22/21 0855 (!) 123/50 -- -- 58 -- -- --   05/22/21 0727 (!) 85/47 98.4 °F (36.9 °C) Temporal 62 16 -- --   05/22/21 0719 (!) 85/47 -- -- -- -- -- --   05/22/21 0600 (!) 121/43 -- -- 64 20 -- --   05/22/21 0500 -- -- -- 61 22 100 % --   05/22/21 0400 (!) 121/43 98.2 °F (36.8 °C) Temporal 61 17 100 % --   05/22/21 0300 -- -- -- 62 16 100 % --   05/22/21 0200 -- -- -- 61 19 99 % --   05/22/21 0100 -- -- -- 59 16 (!) 89 % --   05/22/21 0000 -- -- -- 60 17 92 % --   05/21/21 2300 -- -- -- 59 16 96 % --   05/21/21 2200 -- -- -- 59 16 95 % --   05/21/21 2100 -- -- -- 63 17 94 % --   05/21/21 2055 (!) 109/40 -- -- 62 -- -- --   05/21/21 2000 (!) 145/58 97.4 °F (36.3 °C) Temporal 68 18 92 % --   05/21/21 1900 -- -- -- 66 18 95 % --   05/21/21 1800 -- -- -- 65 21 96 % --   05/21/21 1700 -- -- -- 64 20 100 % --   05/21/21 1601 (!) 140/52 -- -- -- -- -- --   05/21/21 1600 -- 97 °F (36.1 °C) Temporal 66 19 99 % --   05/21/21 1515 -- -- -- 62 14 100 % --   05/21/21 1512 -- -- -- -- -- -- 5' 10\" (1.778 m)   05/21/21 1500 -- -- -- 61 16 99 % --   05/21/21 1445 -- -- -- 58 -- -- --   05/21/21 1430 -- -- -- 59 -- -- --   05/21/21 1415 -- -- -- 58 -- -- --   05/21/21 1400 -- -- -- 58 18 100 % --   05/21/21 1345 -- -- -- 58 -- -- --   05/21/21 1330 -- -- -- 58 -- -- --   05/21/21 1315 -- -- -- 57 -- -- --   05/21/21 1300 -- -- -- 57 22 99 % --   05/21/21 1245 -- -- -- 57 -- -- --   05/21/21 1230 -- -- -- 58 -- -- --   05/21/21 1215 -- -- -- 59 -- -- --   05/21/21 1200 -- -- -- 59 21 99 % --   05/21/21 1145 -- 98 °F (36.7 °C) -- 60 18 96 % --          Intake/Output Summary (Last 24 hours) at 5/22/2021 1139  Last data filed at 5/21/2021 1515  Gross per 24 hour   Intake 326 ml   Output 300 ml   Net 26 ml       Wt Readings from Last 3 Encounters:   05/20/21 198 lb 6.6 oz (90 kg)   05/19/21 189 lb (85.7 kg)   02/25/21 189 lb (85.7 kg)       Constitutional:  Patient in no acute distress  Head: normocephalic, atraumatic  Cardiovascular: regular rate and rhythm, no murmurs, gallops, or rubs  Respiratory:  Clear, no rales, rhochi, or wheezes  Gastrointestinal: soft, nontender, nondistended  Ext: no edema  Neuro: aaox3  Skin: dry, no rash      DATA:      Recent Labs     05/20/21 0449 05/21/21 0401 05/21/21  2223 05/22/21  0423   WBC 5.5 8.3  --  11.4   HGB 9.5* 9.1* 9.0* 9.3*   HCT 29.9* 29.4* 29.4* 29.9*   MCV 90.9 91.0  --  89.8    183  --  180     Recent Labs     05/20/21 0449 05/21/21  0401 05/22/21  0423    138 136   K 4.6 4.1 4.5   CL 98 98 96*   CO2 26 25 24   BUN 28* 21 24*   CREATININE 5.0* 4.1* 3.8*   LABGLOM 10 13 14   GLUCOSE 109* 87 141*   CALCIUM 8.8 9.1 9.1     Recent Labs     05/20/21 0449 05/21/21  0401 05/22/21  0423   ALT 99* 76* 62*     Lab Results   Component Value Date    LABALBU 3.5 05/22/2021    LABALBU 3.7 05/21/2021    LABALBU 3.6 05/20/2021       Iron studies:  Lab Results   Component Value Date    FERRITIN 334 11/09/2017    IRON 33 (L) 11/09/2017    TIBC 222 (L) 11/09/2017     Vitamin B-12   Date Value Ref Range Status   02/17/2017 1802 (H) 211 - 946 pg/mL Final     Folate   Date Value Ref Range Status   02/17/2017 >20.0 4.8 - 24.2 ng/mL Final       Bone disease:  Lab Results   Component Value Date    MG 2.0 05/22/2021    PHOS 4.9 (H) 05/22/2021     Vit D, 25-Hydroxy   Date Value Ref Range Status   06/20/2017 26 (L) 30 - 100 ng/mL Final     Comment:     <20 ng/mL. ........... Ariana Rued Deficient  20-30 ng/mL. ......... Ariana Rued Insufficient   ng/mL. ........ Ariana Rued Sufficient  >100 ng/mL. .......... Ariana Rued Toxic       PTH   Date Value Ref Range Status   06/20/2017 281 (H) 15 - 65 pg/mL Final       No components found for: URIC    Lab Results   Component Value Date    COLORU Yellow 11/07/2017    NITRU Negative 11/07/2017    GLUCOSEU Negative 11/07/2017    GLUCOSEU NEGATIVE 08/27/2011    KETUA Negative 11/07/2017    UROBILINOGEN 0.2 11/07/2017    BILIRUBINUR Negative 11/07/2017    BILIRUBINUR NEGATIVE 08/27/2011       No results found for: Nidia Garnerhr      echo 5/19/21  The left ventricle is dilated  There is apical, septal and basal inferior wall severe hypokinesis to akinesis  Ejection fraction is visually estimated at 30+/-5%. There is doppler evidence of stage III diastolic dysfunction. Normal right ventricular size. Right ventricle global systolic function is mildly reduced . The left atrium is moderately dilated. Severe posterior mitral annular calcification. Focal calcification mitral valve leaflets. Chordal calcification is present. A mobile well defined 1.0 cm by 0.5 cm echo density is noted on the ventricular aspect of the mitral valve suggestive of a  fibroelastoma: clinical correlation is needed. No mitral stenosis. Mild mitral regurgitation. Mild to moderate tricuspid regurgitation. Moderate pulmonary hypertension. Aortic root is sclerotic and calcified. IMPRESSION/RECOMMENDATIONS:      1. esrd  Hd per her usual orders from [address] [place] [address] 5/21     2. Sp VT arrest  Per cardiology  Known LAD lesion from 2012  On statin asa bb nitro  Sp cath 5/21 with pci to rca and lad  On nitro, statin, bb, hydralazine, asa     3. Anemia  jehovahs witness  Continue demarcus     4.  Secondary hyperparathyroidism  Follow on renvela when taking po  p 4.5    5. HFrEF/diastolic dysfn  Nitro

## 2021-05-22 NOTE — PROGRESS NOTES
[facility]    Attending Physician Statement  I have discussed the case, including pertinent history (medical, surgical, family and social) and exam findings with the resident and the team.  I have seen and examined the patient and the key elements of the encounter have been performed by me. I agree with the assessment, plan and orders as documented by the resident. The patient was seen earlier by me on rounds with the team.    She is doing well post stents x 2 - mental status is normal - no new issues - awaiting cardio eval today - home soon if not today if ok by cardio. I have reviewed my findings and recommendations with Claudia Kinney. Remainder of medical problems as per resident note.       Nancy Cabrales MD, M.JAYLAN., Drea [facility]  Internal Medicine Residency Faculty

## 2021-05-22 NOTE — PROGRESS NOTES
Reason for follow up: Status post cardiac arrest, non-STEMI, status post PCI to LAD and RCA Using drug-eluting stents. Subjective: Patient denies chest discomfort or dyspnea. Objective:    No distress. No events overnight. Scheduled Meds:   sodium chloride  250 mL Intravenous Once    sodium chloride flush  5-40 mL Intravenous 2 times per day    metoprolol succinate  25 mg Oral BID    isosorbide mononitrate  30 mg Oral Daily    hydrALAZINE  10 mg Oral 3 times per day    heparin (porcine)  5,000 Units Subcutaneous 3 times per day    ticagrelor  90 mg Oral BID    famotidine  10 mg Oral Daily    aspirin  81 mg Oral Daily    atorvastatin  40 mg Oral Nightly       Continuous Infusions:   sodium chloride      sodium chloride      dextrose             Intake/Output Summary (Last 24 hours) at 5/22/2021 0810  Last data filed at 5/21/2021 1515  Gross per 24 hour   Intake 326 ml   Output 300 ml   Net 26 ml       Patient Vitals for the past 96 hrs (Last 3 readings):   Weight   05/20/21 2009 198 lb 6.6 oz (90 kg)   05/20/21 0600 200 lb 14.4 oz (91.1 kg)   05/19/21 1745 194 lb 11.2 oz (88.3 kg)          PE:   BP (!) 85/47   Pulse 62   Temp 98.4 °F (36.9 °C) (Temporal)   Resp 16   Ht 5' 10\" (1.778 m)   Wt 198 lb 6.6 oz (90 kg)   SpO2 100%   BMI 28.47 kg/m²   CONST: Middle-age obese -American female who appears of stated age. Awake, alert, cooperative, no apparent distress. HEENT: Head- normocephalic, atraumatic. Neck:  no jugular venous distention. No carotid bruit noted. LUNGS: Decreased air entry at the bases with no wheezing or crackles. CARDIOVASCULAR: RRR, soft systolic murmur best heard at the left sternal border, no s3, s4 or rub noted. PV: No lower extremity edema. Decreased pedal pulses. ABDOMEN: Soft, non-tender to light palpation. Bowel sounds present.  No palpable masses no hepatosplenomegaly or splenomegaly; no abdominal bruit / pulsation. SKIN: Warm and dry . NEURO / PSYCH: Oriented to person, place and time. Speech clear and appropriate. Follows all commands. Pleasant affect. Monitor: Sinus bradycardia at 59 bpm.      Lab Review       Recent Labs     05/20/21 0449 05/21/21 0401 05/21/21 2223 05/22/21  0423   WBC 5.5 8.3  --  11.4   HGB 9.5* 9.1* 9.0* 9.3*   HCT 29.9* 29.4* 29.4* 29.9*    183  --  180       Recent Labs     05/20/21 0449 05/21/21  0401 05/22/21  0423    138 136   K 4.6 4.1 4.5   CL 98 98 96*   CO2 26 25 24   PHOS 4.5 4.7* 4.9*   BUN 28* 21 24*   CREATININE 5.0* 4.1* 3.8*       Recent Labs     05/22/21  0423   AST 28   ALT 62*   BILIDIR 0.3   ALKPHOS 108*       Recent Labs     05/19/21 2125 05/20/21  0131 05/20/21  0806   CKTOTAL 67 63 58   CKMB 4.3 3.5 2.7       Last 3 Troponin:    Lab Results   Component Value Date    TROPONINI 0.83 05/20/2021    TROPONINI 0.81 05/20/2021    TROPONINI 0.82 05/19/2021         Recent Labs     05/19/21  1245   INR 1.1       Recent Labs     05/19/21  1245   PROBNP >70,000*             Assessment:  -Status post cardiac arrest.  -Non-STEMI: Status post PCI to LAD and RCA using drug-eluting stents: Stable with no angina.  -Ischemic cardiomyopathy with severe systolic dysfunction and grade 3 diastolic dysfunction: Clinically compensated. -Hypertension: Low normal blood pressure this morning but asymptomatic.  -Hyperlipidemia. -Diabetes. -End-stage renal disease on hemodialysis. -Anemia of chronic disease.  -History of right breast carcinoma, status post mastectomy and XRT. -History of bladder carcinoma, status post chemotherapy. Plan:  -May hold Imdur and hydralazine this morning due to low blood pressure. May resume when blood pressure allows.  -Continue other cardiac medications. -May transfer to the floor if she remains stable. -Will need LifeVest prior to hospital discharge.  -Cardiac rehab post hospital discharge.  -We will sign off.   May call if needed.  -Follow-up with Dr. Teresa Lisa 3 to 4 weeks after hospital discharge. Electronically signed by Imelda Cope MD on 5/22/2021 at [facility].

## 2021-05-22 NOTE — PROGRESS NOTES
[address] [facility]   Department of Internal Medicine   Internal Medicine Residency  MICU Progress Note    Patient:  Dennis Haney 73 y.o. female   MRN: [id number]       Date of Service: 2021    Allergy: Lasix [furosemide]  Cc follow CAD  Subjective     No acute events overnight. Patient in no acute distress this morning. Denies any chest pain or SOB. Had 2 MARYLOU placed in LAD and RCA respectively yesterday. Patient tolerating DAPT, hemoglobin remains stable. No further episodes of VT/VF off lidocaine gtt. No other acute complaints at this time. Objective     TEMPERATURE:  Current - Temp: 98.4 °F (36.9 °C); Max - Temp  Av.8 °F (36.6 °C)  Min: 97 °F (36.1 °C)  Max: 98.4 °F (36.9 °C)  RESPIRATIONS RANGE: Resp  Av.1  Min: 14  Max: 22  PULSE RANGE: Pulse  Av.5  Min: 57  Max: 68  BLOOD PRESSURE RANGE:  Systolic (06XRA), ZK , Min:85 , EGL:630   ; Diastolic (97BLN), JIQ:86, Min:40, Max:58    PULSE OXIMETRY RANGE: SpO2  Av.1 %  Min: 89 %  Max: 100 %    I & O - 24hr:    Intake/Output Summary (Last 24 hours) at 2021 0929  Last data filed at 2021 1515  Gross per 24 hour   Intake 326 ml   Output 300 ml   Net 26 ml     I/O last 3 completed shifts: In: 326 [I.V.:26]  Out: 300  No intake/output data recorded.    Weight change:     Physical Examination:  General: alert, awake, in no acute distress  HEENT: NC, AT, moist oral mucosa  Neck: supple  Pulmonary: clear to auscultation bilaterally, no wheezing or crackles  CV: RRR, S1 S2 heard, no MRG  Abd: soft, nontender, nondistended, BS +  Ext: no pedal edema  Musculoskeletal: right shoulder pain; right hand weakness due to pain  Neuro: Alert, awake, no gross focal neurological deficit      Medications     Continuous Infusions:   sodium chloride      sodium chloride      dextrose       Scheduled Meds:   sodium chloride  250 mL Intravenous Once    sodium chloride flush  5-40 mL Intravenous 2 times per day    metoprolol succinate 25 mg Oral BID    isosorbide mononitrate  30 mg Oral Daily    hydrALAZINE  10 mg Oral 3 times per day    heparin (porcine)  5,000 Units Subcutaneous 3 times per day    ticagrelor  90 mg Oral BID    famotidine  10 mg Oral Daily    aspirin  81 mg Oral Daily    atorvastatin  40 mg Oral Nightly     PRN Meds: sodium chloride flush, sodium chloride, polyethylene glycol, morphine, perflutren lipid microspheres, acetaminophen, sodium chloride flush, sodium chloride, glucose, dextrose, glucagon (rDNA), dextrose  Nutrition:   Cardiac diet    Labs and Imaging Studies     CBC:   Recent Labs     05/20/21 0449 05/21/21  0401 05/21/21  2223 05/22/21  0423   WBC 5.5 8.3  --  11.4   HGB 9.5* 9.1* 9.0* 9.3*   HCT 29.9* 29.4* 29.4* 29.9*   MCV 90.9 91.0  --  89.8    183  --  180       BMP:    Recent Labs     05/20/21 0449 05/21/21  0401 05/22/21  0423    138 136   K 4.6 4.1 4.5   CL 98 98 96*   CO2 26 25 24   BUN 28* 21 24*   CREATININE 5.0* 4.1* 3.8*   GLUCOSE 109* 87 141*       LIVER PROFILE:   Recent Labs     05/20/21 0449 05/21/21  0401 05/22/21  0423   AST 67* 45* 28   ALT 99* 76* 62*   BILIDIR 0.2 0.3 0.3   BILITOT 0.5 0.7 0.8   ALKPHOS 117* 111* 108*       PT/INR:   Recent Labs     05/19/21  1245   PROTIME 11.5   INR 1.1       APTT:   Recent Labs     05/20/21  0449 05/20/21  0806 05/21/21  0401   APTT 72.8* 76.8* 92.9*       Fasting Lipid Panel:    Lab Results   Component Value Date    CHOL 101 05/19/2021    TRIG 106 05/19/2021    HDL 38 05/19/2021       Cardiac Enzymes:    Lab Results   Component Value Date    CKTOTAL 58 05/20/2021    CKTOTAL 63 05/20/2021    CKTOTAL 67 05/19/2021    CKMB 2.7 05/20/2021    CKMB 3.5 05/20/2021    CKMB 4.3 05/19/2021    TROPONINI 0.83 (H) 05/20/2021    TROPONINI 0.81 (H) 05/20/2021    TROPONINI 0.82 (H) 05/19/2021       Notable Cultures:      Blood cultures   Blood Culture, Routine   Date Value Ref Range Status   11/15/2017 5 Days- no growth  Final     Respiratory cultures No results found for: RESPCULTURE   Gram Stain Result   Date Value Ref Range Status   10/12/2014 Refer to ordered Gram stain for results  Final     Urine   Urine Culture, Routine   Date Value Ref Range Status   11/07/2017 Growth not present  Final     Legionella No results found for: LABLEGI  C Diff PCR No results found for: CDIFPCR  Wound culture/abscess: No results for input(s): WNDABS in the last 72 hours. Tip culture:No results for input(s): CXCATHTIP in the last 72 hours. Oxygen:     Vent Information  SpO2: 100 %  Additional Respiratory  Assessments  Pulse: 58  Resp: 16  SpO2: 100 %       [REMOVED] Urethral Catheter-Output (mL): 5 mL    Imaging Studies:    NM Cardiac Stress Test Nuclear Imaging   Final Result      The myocardial perfusion imaging was abnormal      The abnormality was a large fixed defect in the apical and septal   walls; small fixed defect in the inferoapical wall; partially   reversible defect in the small area of anterolateral wall. Overall left ventricular systolic function was severely reduced, EF   25% with apical septal akinesis, moderate global hypokinesis. Dilated   left ventricle during stress and rest.      High risk myocardial perfusion study      Compared to previous study from August 2011 which showed large lateral   wall ischemia with EF 66%. Gutierrez Steven MD, [facility]                     XR CHEST PORTABLE   Final Result   Cardiomegaly with pulmonary vascular congestive changes. Left-sided internal jugular line tip terminates in the region of the left   aortic arch and should be advanced. Stable positioning of right sided port a catheter tip in the SVC. XR CHEST PORTABLE   Final Result   No acute process. Cardiomegaly. CTA HEAD W CONTRAST   Final Result   1. Mild atherosclerotic disease . No large vessel occlusion   identified   2.  Estimated stenosis of the proximal right and left internal carotid   artery by NASCET criteria is not hemodynamically significant         This study was analyzed by the [address] ai algorithm. CTA NECK W CONTRAST   Final Result   1. Mild atherosclerotic disease . No large vessel occlusion   identified   2. Estimated stenosis of the proximal right and left internal carotid   artery by NASCET criteria is not hemodynamically significant         This study was analyzed by the [employer] ai algorithm. CT BRAIN PERFUSION   Final Result      No significant ischemic penumbra identified      This study was analyzed by the [employer]. ai algorithm. CT HEAD WO CONTRAST   Final Result   No acute intracranial abnormality. Resident's Assessment and Plan      Clare Arboleda is 73 y.o. female with a PMH of hypertension, hyperlipidemia, obesity, type 2 diabetes mellitus with neuropathy/retinopathy, right breast cancer s/p mastectomy/XRT with right upper extremity lymphedema, bladder cancer s/p chemotherapy, right-sided Mediport in place. ESRD on dialysis, CAD s/p C 2011 (90% stenosis of LCx no PCI), spinal cord disease s/p spinal implant presented after being found unresponsive at the time of dialysis and received shock on time due to shockable rhythm on monitor. Assessment:   1. S/p cardiac arrest; received shock x1, rhythm unknown; likely pulseless v tack or ventricular fibrillation  2. Sustained ventricular tachycardia; was on lidocaine drip  3. CAD s/p LHC revealing 90% stenosis or LCx (2011); echocardiography revealing EF of 60-65%, moderate LVH, aortic valve sclerosis (12/2020);  Stress test positive for large fixed defect in apical and septal walls, small fixed defect in the inferoapical wall, partially reversible defect in the small area of anterolateral wall; EF of 25% with apical septal akinesis, moderate global hypokinesia, dilated left ventricle        Significant drop in EF from 66% (11/20)--> 25%  · Cardiac catheterization revealing- Left main:  20% eccentric mid vessel narrowing, LAD:  50% proximal vessel narrowing and 95% mid vessel stenosis. · Trivial diagonal branch with 90% stenosis, LCX:  Occluded, RCA:  Large, dominant vessel with 90% heavily calcified mid vessel stenosis, 50% disease at the level of the crux and 70% ostial stenosis of the posterior descending artery branch  · S/p LAD, mid RCA MARYLOU stent placement (5/21)    4. Elevated troponin in the setting of ERIKA likely secondary to NSTEMI; intermittent chest pain for the past 2 weeks; Troponin 0.87--> 0.81  5. Acute metabolic encephalopathy likely due to cardiac arrest/ metabolic derangement; resolved  6. Slurring of speech and right sided weakness likely due to TIA; CT head, CT cervical, CT brain unremarkable  7. ESRD on HD; MWF; Baseline creatinine of 3.8-4.5  8. Right shoulder pain due to osteoarthritis  9. History of right breast carcinoma, s/p mastectomy, radiotherapy with residual right upper extremity lymphedema  10. History of bladder carcinoma, S/p chemotherapy ( around 2009, 2010)  6. Christian    Plan:  · DAPT per cardiology with aspirin and brillinta  · IMDUR and hydralazine with holding parameters for BP  · Continue metoprolol XL, atorvastatin  · Monitor H/H Q12 hourly, recommend keeping hgb above 8 for ACS  · Nephrology on board; HD per nephrology  · Aspiration/fall precautions  · Monitor in MICU today, transfer in afternoon if BP remains stable    # Peptic ulcer prophylaxis: Pantoprazole  # DVT Prophylaxis: Heparin   # Disposition: Transfer to telemetry    Patricia Gaona MD, PGY-2  Attending Physician: Dr. Amira Ordoñez    I personally saw, examined and provided care for the patient. Radiographs, labs and medication list were reviewed by me independently. I spoke with bedside nursing, therapists and consultants. Critical care services and times documented are independent of procedures and multidisciplinary rounds with Residents.  Additionally comprehensive, multidisciplinary rounds were conducted

## 2021-05-22 NOTE — CONSULTS
Medical Intensive Care Unit     Shannan Reyna  Resident History and Physical    Date and time: 5/21/2021 10:18 PM  Patient's name:  Dannie Barrios  Medical Record Number: [id number]  Patient's account/billing number: [de-identified]  Patient's YOB: 1955  Age: 73 y.o. Date of Admission: 5/19/2021 12:41 PM  Length of stay during current admission: 2    Primary Care Physician: Madeleine Buckley MD  ICU Attending Physician: Dr. Clerance Gilford    Code Status: Full Code     Reason for ICU admission: Cardiac arrest     History of Present Illness:   56-year-old female with known history of hypertension, hyperlipidemia, obesity, type 2 diabetes mellitus with neuropathy/retinopathy, right breast cancer s/p mastectomy/XRT with right upper extremity lymphedema, bladder cancer s/p chemotherapy, right-sided Mediport in place. ESRD on dialysis, CAD s/p LHC 2011 (90% stenosis of LCx no PCI), spinal cord disease s/p spinal implant presented to the emergency department from dialysis center ([facility]) after being found pulseless and unresponsive at 11.30 AM.  AED recommended shock, patient defibrillated x1 with ROSC. Yaajira Callejas On presentation to the emergency department patient was noted to be awake, alert but confused. CT head, CT brain perfusion were not significant for acute intracranial process. CTA head and neck were significant for mild atherosclerotic disease without large vessel occlusion. Telestroke was consulted -TPA was not indicated given the onset was unclear. Labs are significant for elevated troponin (more than baseline). EKG significant for sinus rhythm with ST segment changes patient was noted to have V. tach in the emergency department and was started on lidocaine drip. Further history obtained from patient's family -patient was noted to have some slurred speech which was different than baseline.   Patient sister also mentioned that the patient was complaining of some chest pain about a week ago. Patient is able to give us her past medical history accurately but seems to forget conversation within 10 minutes-restarting questioning with 'why am I here?', '  What happened? I need to call my sister'. CURRENT VENTILATION STATUS:   [] Ventilator  [] BIPAP  [x] Nasal Cannula [] Room Air      VASOPRESSORS:  [x] No    [] Yes     CENTRAL LINES:     [x] No   [] Yes   (Date of Insertion:   )           If yes -     [] Right IJ     [] Left IJ [] Right Femoral [] Left Femoral                   [] Right Subclavian [] Left Subclavian     SOTOMAYOR'S CATHETER:   [x] No   [] Yes  (Date of Insertion:   )     URINE OUTPUT:            [] Good   [] Low              [x] Anuric    REVIEW OF SYSTEMS:    · Constitutional: No fever, no chills, no change in weight; good appetite  · HEENT: No blurred vision, no ear problems, no sore throat, no rhinorrhea. · Respiratory: No cough, no sputum production, no pleuritic chest pain, no shortness of breath  · Cardiology: No angina, no dyspnea on exertion, no paroxysmal nocturnal dyspnea, no orthopnea, no palpitation, no leg swelling. · Gastroenterology: No dysphagia, no reflux; no abdominal pain, no nausea or vomiting; no constipation or diarrhea.  No hematochezia   · Genitourinary: No dysuria, no frequency, hesitancy; no hematuria  · Musculoskeletal: no joint pain, no myalgia, no change in range of movement  · Neurology: no focal weakness in extremities, no slurred speech, no double vision, no tingling or numbness sensation  · Endocrinology: no temperature intolerance, no polyphagia, polydipsia or polyuria  · Hematology: no increased bleeding, no bruising, no lymphadenopathy  · Skin: no skin changes noticed by patient  · Psychology: no depressed mood, no suicidal ideation    OBJECTIVE:     VITAL SIGNS:  BP (!) 109/40   Pulse 63   Temp 97.4 °F (36.3 °C) (Temporal)   Resp 17   Ht 5' 10\" (1.778 m)   Wt 198 lb 6.6 oz (90 kg)   SpO2 94%   BMI 28.47 kg/m²   Tmax over 24 hours: Temp (24hrs), Av.6 °F (36.4 °C), Min:97 °F (36.1 °C), Max:98 °F (36.7 °C)      Patient Vitals for the past 6 hrs:   BP Temp Temp src Pulse Resp SpO2   21 -- -- -- 63 17 94 %   21 (!) 109/40 -- -- 62 -- --   21 (!) 145/58 97.4 °F (36.3 °C) Temporal 68 18 92 %   21 1900 -- -- -- 66 18 95 %   21 1800 -- -- -- 65 21 96 %   21 1700 -- -- -- 64 20 100 %         Intake/Output Summary (Last 24 hours) at 2021 2218  Last data filed at 2021 1515  Gross per 24 hour   Intake 326 ml   Output 310 ml   Net 16 ml     Wt Readings from Last 2 Encounters:   21 198 lb 6.6 oz (90 kg)   21 189 lb (85.7 kg)     Body mass index is 28.47 kg/m². PHYSICAL EXAMINATION:  Physical Exam  Vitals and nursing note reviewed. Constitutional:       General: She is not in acute distress. Appearance: She is obese. She is ill-appearing. HENT:      Head: Normocephalic and atraumatic. Mouth/Throat:      Mouth: Mucous membranes are moist.   Eyes:      Extraocular Movements: Extraocular movements intact. Pupils: Pupils are equal, round, and reactive to light. Cardiovascular:      Rate and Rhythm: Normal rate and regular rhythm. Heart sounds: No murmur heard. No friction rub. Pulmonary:      Effort: Pulmonary effort is normal.      Breath sounds: Normal breath sounds. Abdominal:      General: Bowel sounds are normal.      Palpations: Abdomen is soft. Musculoskeletal:         General: No swelling or tenderness. Right lower leg: No edema. Left lower leg: No edema. Skin:     General: Skin is warm. Capillary Refill: Capillary refill takes less than 2 seconds. Neurological:      Mental Status: She is alert. She is disoriented.               MEDICATIONS:  Scheduled Meds:   sodium chloride flush  5-40 mL Intravenous 2 times per day    metoprolol succinate  25 mg Oral BID    isosorbide mononitrate  30 mg Oral Daily    hydrALAZINE  10 mg Oral 3 times per day    heparin (porcine)  5,000 Units Subcutaneous 3 times per day    ticagrelor  90 mg Oral BID    [START ON 5/22/2021] famotidine  10 mg Oral Daily    aspirin  81 mg Oral Daily    atorvastatin  40 mg Oral Nightly     Continuous Infusions:   sodium chloride      sodium chloride      dextrose       PRN Meds:   sodium chloride flush, 5-40 mL, PRN  sodium chloride, 25 mL, PRN  polyethylene glycol, 17 g, Daily PRN  morphine, 2 mg, Q2H PRN  perflutren lipid microspheres, 1.5 mL, ONCE PRN  acetaminophen, 650 mg, Q6H PRN  sodium chloride flush, 10 mL, PRN  sodium chloride, 25 mL, PRN  glucose, 15 g, PRN  dextrose, 12.5 g, PRN  glucagon (rDNA), 1 mg, PRN  dextrose, 100 mL/hr, PRN      VENT SETTINGS (Comprehensive) (if applicable):  Vent Information  SpO2: 94 %  Additional Respiratory  Assessments  Pulse: 63  Resp: 17  SpO2: 94 %    ABGs:   Recent Labs     05/19/21  1247   PH 7.415   PCO2 39.4   PO2 63.0*   HCO3 24.7   BE 0.2   O2SAT 92.0       Laboratory findings:  Complete Blood Count:   Recent Labs     05/19/21 2125 05/20/21  0131 05/20/21 0449 05/21/21  0401   WBC 5.6  --  5.5 8.3   HGB 10.2* 9.3* 9.5* 9.1*   HCT 32.3* 30.8* 29.9* 29.4*     --  178 183        Last 3 Blood Glucose:   Recent Labs     05/19/21 2125 05/20/21 0449 05/21/21  0401   GLUCOSE 166* 109* 87        PT/INR:    Lab Results   Component Value Date    PROTIME 11.5 05/19/2021    PROTIME 10.4 02/15/2012    INR 1.1 05/19/2021     PTT:    Lab Results   Component Value Date    APTT 92.9 05/21/2021       Comprehensive Metabolic Profile:   Recent Labs     05/19/21 2125 05/20/21 0449 05/21/21  0401    138 138   K 4.2 4.6 4.1   CL 96* 98 98   CO2 27 26 25   BUN 24* 28* 21   CREATININE 4.6* 5.0* 4.1*   GLUCOSE 166* 109* 87   CALCIUM 8.9 8.8 9.1   PROT 6.8 6.0* 6.1*   LABALBU 4.0 3.6 3.7   BILITOT 0.6 0.5 0.7   ALKPHOS 136* 117* 111*   * 67* 45*   * 99* 76*      Magnesium:   Lab Results Component Value Date    MG 2.1 05/21/2021     Phosphorus:   Lab Results   Component Value Date    PHOS 4.7 05/21/2021     Ionized Calcium:   Lab Results   Component Value Date    CAION 1.22 11/08/2017      Troponin:   Recent Labs     05/19/21  2125 05/20/21  0131 05/20/21  0806   TROPONINI 0.82* 0.81* 0.83*     Radiology/Imaging:   Chest Xray (5/21/2021): No acute process     ASSESSMENT  And PLAN:     1. Cardiac arrest, likely V. Fib/V. tach  -resuscitated after 1 shock   2. Sustained ventricular tachycardia  -started on lidocaine drip in the ED  3. Elevated troponin  -higher than baseline, no acute ischemic changes on EKG  -known history of CAD. Recently had coronary CTA with a coronary artery calcium score of 3671.63 in December 2020  4. ESRD on dialysis  -left upper extremity fistula. Mondays Wednesdays Fridays dialysis. Follows with Dr. Saima Gu  5. Acute encephalopathy, likely 2/2 Ketamine vs CVA  -ESRD pt, has not received dialysis yet-poor ketamine secretion? Short-term memory loss noticed. 6. History of right breast carcinoma s/p right mastectomy as/P XRT with residual right upper extremity lymphedema  7. History of bladder carcinoma s/p chemotherapy  8. Rastafarian    Plan  -Started on aspirin. continue IV heparin, topical nitrates lidocaine per cardiology.   Plan for left heart cath on 5/20/2021 versus Franklin Fortes depending on changes in mentation  -Start beta-blocker therapy, continue statin  -Follow H&H  -Follow telemetry stroke recommendation -EEG, MRI, echocardiogram.   -SBP goal less than 220/110, neurochecks every 4 -stat imaging if change in mentation and hold heparin drip.  -Consult nephrology for ESRD management  -Swallow assessment given patient's mentation  -Per patient's history Mediport was accessed recently-not certain about when and where-blood cultures were ordered  -Hypoglycemia protocol POC every 4      ICU PROPHYLAXIS:  Stress ulcer:  [x] PPI Agent  [] [id number] [] Sucralfate  [] Other:  VTE:   [] Enoxaparin  [x] Unfract. Heparin Subcut  [] EPC Cuffs    NUTRITION:  [x] NPO [] Tube Feeding (Specify: ) [] TPN  [] PO (Diet: DIET CARDIAC; Low Sodium (2 GM); Cardiac, Renal)    CONSULTATION NEEDED:   [] No   [x] Yes    FAMILY UPDATED:    [] No   [x] Yes    TRANSFER OUT OF ICU:   [x] No   [] Yes    Yulissa Fraire MD, MD PGY-2  Attending Physician: Dr. Weston Greene   5/21/2021, 10:18 PM     I personally saw, examined and provided care for the patient. Radiographs, labs and medication list were reviewed by me independently. I spoke with bedside nursing, therapists and consultants. Critical care services and times documented are independent of procedures and multidisciplinary rounds with Residents. Additionally comprehensive, multidisciplinary rounds were conducted with the MICU team. The case was discussed in detail and plans for care were established. Review of Residents documentation was conducted and revisions were made as appropriate. I agree with the above documented exam, problem list and plan of care. Chest pain   Possible CAD  Bedside echo ,EF less than 30 %   Discuss with dr Oskar bishop in [place] [address]  Pulmonary&Critical Care Medicine   Director of [address] [place] [address] Director of [address] [place] [address]    Louann Kim  I personally saw, examined and provided care for the patient. Radiographs, labs and medication list were reviewed by me independently. I spoke with bedside nursing, therapists and consultants. Critical care services and times documented are independent of procedures and multidisciplinary rounds with Residents. Additionally comprehensive, multidisciplinary rounds were conducted with the MICU team. The case was discussed in detail and plans for care were established. Review of Residents documentation was conducted and revisions were made as appropriate.  I agree with the above documented exam, problem list and plan of care.    CAD s/p 2 stent ,LAD and RCA   Discuss with Dr Natalie Fry as per cardiology  Jigna Davis MD

## 2021-05-22 NOTE — PROGRESS NOTES
[facility]  Internal Medicine Residency Program  Progress Note - House Team 2    Patient:  Dannie Barrios 73 y.o. female MRN: [id number]     Date of Service: 5/22/2021     CC: Vtach/Vfib arrest x2   Overnight events:  Ovn, pt c/o 8/10 chest pain responsive to SL Nitroglycerin x1. Nitro gtt and PRN Morphine started. Cardiology notified and plan for cath lab early this morning. Subjective   Dannie Barrios is a 73 y.o. female with PMH of HTN, T2DM, CAD w/ cath showing 90% stenosis of L circumflex, ESRD on HD, R breast cancer s/p mastectomy w/ residual lymphedema, and HFpEF (EF 60-65%), who presented to the ED with cardiac arrest.    On day 3 of hospital admission   Patient was seen at bed side in the am.   Awake, alert, responding to questions appropriately. Mild R sided musculoskeletal chest pain, dissimilar to chest pain she experienced prior to arrest. Denies dyspnea/cough, abdominal pain. Objective     Physical Exam:  Vitals: BP (!) 123/50   Pulse 58   Temp 98.4 °F (36.9 °C) (Temporal)   Resp 16   Ht 5' 10\" (1.778 m)   Wt 198 lb 6.6 oz (90 kg)   SpO2 100%   BMI 28.47 kg/m²       Constitutional: Alert, conversational. Sandhya Buys commands. In no apparent distress. Head: Normocephalic and atraumatic. Eyes: Conjunctiva normal, (-) scleral icterus. Mucus membranes moist.  Mouth: Mucus membranes moist. Oropharynx clear. No deviation of the tongue or uvula. Neck: (-) swelling, trachea midline  Respiratory: Lungs clear to auscultation bilaterally. (-)  wheezes, (-)  rales, (-)  Rhonchi. No increased work of breathing or respiratory distress. Cardiovascular: RRR. (-) murmurs, (-) gallops, (-) rubs. S1 and S2 were normal. L AV fistula present, R-sided mediport in place. L femoral CVC in place. GI:  Abdomen soft, non-tender, non-distended. (+) BS. (-) guarding, (-) rigidity. Extremities: R-sided cath site covered with a clean, dry bandage.  Compartments soft and no hematoma detected. Pulses intact. Warm and well perfused. (-) clubbing, (-) cyanosis. (-) peripheral edema. (-) sacral pitting edema. Neurologic: A&O x3. No focal neurological deficits.  No speech slurring or tremor    Pertinent Labs & Imaging Studies   aleks  CBC with Differential:    Lab Results   Component Value Date    WBC 11.4 05/22/2021    RBC 3.33 05/22/2021    HGB 9.3 05/22/2021    HCT 29.9 05/22/2021     05/22/2021    MCV 89.8 05/22/2021    MCH 27.9 05/22/2021    MCHC 31.1 05/22/2021    RDW 17.2 05/22/2021    NRBC 0.0 02/18/2017    SEGSPCT 70 03/12/2014    BANDSPCT 1 02/18/2015    LYMPHOPCT 7.3 05/22/2021    PROMYELOPCT 1.8 02/18/2017    MONOPCT 11.6 05/22/2021    MYELOPCT 0.9 10/24/2017    BASOPCT 0.2 05/22/2021    MONOSABS 1.32 05/22/2021    LYMPHSABS 0.83 05/22/2021    EOSABS 0.01 05/22/2021    BASOSABS 0.02 05/22/2021     BMP:    Lab Results   Component Value Date     05/22/2021    K 4.5 05/22/2021    K 3.9 05/19/2021    CL 96 05/22/2021    CO2 24 05/22/2021    BUN 24 05/22/2021    LABALBU 3.5 05/22/2021    LABALBU 3.8 04/02/2012    CREATININE 3.8 05/22/2021    CALCIUM 9.1 05/22/2021    GFRAA 14 05/22/2021    LABGLOM 14 05/22/2021    GLUCOSE 141 05/22/2021    GLUCOSE 46 04/02/2012     Hepatic Function Panel:    Lab Results   Component Value Date    ALKPHOS 108 05/22/2021    ALT 62 05/22/2021    AST 28 05/22/2021    PROT 6.2 05/22/2021    BILITOT 0.8 05/22/2021    BILIDIR 0.3 05/22/2021    IBILI 0.5 05/22/2021    LABALBU 3.5 05/22/2021    LABALBU 3.8 04/02/2012     Magnesium:    Lab Results   Component Value Date    MG 2.0 05/22/2021     Phosphorus:    Lab Results   Component Value Date    PHOS 4.9 05/22/2021     PTT:    Lab Results   Component Value Date    APTT 92.9 05/21/2021     Last 3 Troponin:    Lab Results   Component Value Date    TROPONINI 0.83 05/20/2021    TROPONINI 0.81 05/20/2021    TROPONINI 0.82 05/19/2021     Resident's Assessment and Plan     Assessment     Vtach/Vfib arrest, r/o ischemic cause in setting of multiple cardiac risk factors  Prior heart cath showing 90% stenosis of L circumflex artery - medically managed   Admission labs showed no severe electrolyte dyscrasias     CAD s/p stent to LAD and RCA     Acute encephalopathy in the setting of #1 - resolved   Pt initially demonstrating retrograde amnesia, short attention span - improved on exam this AM  Neurology to see - ordering MRI brain     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     HFrEF 2/2 ICM   Echo from 5/21 showing EF 30% +/- 5%, stage III DD     Hx T2DM  Hgb A1c 6.6% as of 5/19/2021     Hx ESRD on HD    Plan     Continue DAPT   Continue BB, ASA, statin per Cardiology  Holding Imdur and Hydralazine d/t low BP - plan to resume slowly as BP allows  Will need LifeVest prior to d/c    Cardiac Rehab after d/c   Clinical correlation needed for possible cardiac fibroelastoma - consider further imaging/workup   Nephrology, Neurology, Cardiology following, appreciate further recs   Continue HD while inpatient  Hgb/Hct q12  Daily labs - replete electrolytes as necessary     PT/OT evaluation: Consulted   DVT prophylaxis/ GI prophylaxis: Heparin / Protonix   Disposition: Continue current medical management      Keesha Suarez DO, PGY-1  Attending physician: Dr. Samm Fields

## 2021-05-23 LAB
ALBUMIN SERPL-MCNC: 3.3 G/DL (ref 3.5–5.2)
ALP BLD-CCNC: 97 U/L (ref 35–104)
ALT SERPL-CCNC: 51 U/L (ref 0–32)
ANION GAP SERPL CALCULATED.3IONS-SCNC: 12 MMOL/L (ref 7–16)
AST SERPL-CCNC: 21 U/L (ref 0–31)
BASOPHILS ABSOLUTE: 0.02 E9/L (ref 0–0.2)
BASOPHILS RELATIVE PERCENT: 0.3 % (ref 0–2)
BILIRUB SERPL-MCNC: 0.5 MG/DL (ref 0–1.2)
BILIRUBIN DIRECT: <0.2 MG/DL (ref 0–0.3)
BILIRUBIN, INDIRECT: ABNORMAL MG/DL (ref 0–1)
BUN BLDV-MCNC: 37 MG/DL (ref 6–23)
CALCIUM SERPL-MCNC: 8.4 MG/DL (ref 8.6–10.2)
CHLORIDE BLD-SCNC: 94 MMOL/L (ref 98–107)
CO2: 25 MMOL/L (ref 22–29)
CREAT SERPL-MCNC: 4.9 MG/DL (ref 0.5–1)
EOSINOPHILS ABSOLUTE: 0.1 E9/L (ref 0.05–0.5)
EOSINOPHILS RELATIVE PERCENT: 1.3 % (ref 0–6)
GFR AFRICAN AMERICAN: 11
GFR NON-AFRICAN AMERICAN: 11 ML/MIN/1.73
GLUCOSE BLD-MCNC: 154 MG/DL (ref 74–99)
HCT VFR BLD CALC: 29.3 % (ref 34–48)
HEMOGLOBIN: 9.1 G/DL (ref 11.5–15.5)
IMMATURE GRANULOCYTES #: 0.04 E9/L
IMMATURE GRANULOCYTES %: 0.5 % (ref 0–5)
LYMPHOCYTES ABSOLUTE: 1.19 E9/L (ref 1.5–4)
LYMPHOCYTES RELATIVE PERCENT: 15.1 % (ref 20–42)
MAGNESIUM: 2.1 MG/DL (ref 1.6–2.6)
MCH RBC QN AUTO: 27.8 PG (ref 26–35)
MCHC RBC AUTO-ENTMCNC: 31.1 % (ref 32–34.5)
MCV RBC AUTO: 89.6 FL (ref 80–99.9)
METER GLUCOSE: 168 MG/DL (ref 74–99)
MONOCYTES ABSOLUTE: 0.79 E9/L (ref 0.1–0.95)
MONOCYTES RELATIVE PERCENT: 10 % (ref 2–12)
NEUTROPHILS ABSOLUTE: 5.74 E9/L (ref 1.8–7.3)
NEUTROPHILS RELATIVE PERCENT: 72.8 % (ref 43–80)
PDW BLD-RTO: 17.1 FL (ref 11.5–15)
PHOSPHORUS: 4.1 MG/DL (ref 2.5–4.5)
PLATELET # BLD: 194 E9/L (ref 130–450)
PMV BLD AUTO: 11.2 FL (ref 7–12)
POTASSIUM SERPL-SCNC: 4.3 MMOL/L (ref 3.5–5)
RBC # BLD: 3.27 E12/L (ref 3.5–5.5)
SODIUM BLD-SCNC: 131 MMOL/L (ref 132–146)
TOTAL PROTEIN: 6 G/DL (ref 6.4–8.3)
WBC # BLD: 7.9 E9/L (ref 4.5–11.5)

## 2021-05-23 PROCEDURE — 6370000000 HC RX 637 (ALT 250 FOR IP): Performed by: INTERNAL MEDICINE

## 2021-05-23 PROCEDURE — 2580000003 HC RX 258: Performed by: INTERNAL MEDICINE

## 2021-05-23 PROCEDURE — 82962 GLUCOSE BLOOD TEST: CPT

## 2021-05-23 PROCEDURE — 83735 ASSAY OF MAGNESIUM: CPT

## 2021-05-23 PROCEDURE — 99233 SBSQ HOSP IP/OBS HIGH 50: CPT | Performed by: INTERNAL MEDICINE

## 2021-05-23 PROCEDURE — 6360000002 HC RX W HCPCS: Performed by: INTERNAL MEDICINE

## 2021-05-23 PROCEDURE — 80048 BASIC METABOLIC PNL TOTAL CA: CPT

## 2021-05-23 PROCEDURE — 85025 COMPLETE CBC W/AUTO DIFF WBC: CPT

## 2021-05-23 PROCEDURE — 2060000000 HC ICU INTERMEDIATE R&B

## 2021-05-23 PROCEDURE — 80076 HEPATIC FUNCTION PANEL: CPT

## 2021-05-23 PROCEDURE — 84100 ASSAY OF PHOSPHORUS: CPT

## 2021-05-23 PROCEDURE — 99238 HOSP IP/OBS DSCHRG MGMT 30/<: CPT | Performed by: INTERNAL MEDICINE

## 2021-05-23 RX ORDER — ASPIRIN 81 MG/1
81 TABLET ORAL DAILY
Qty: 30 TABLET | Refills: 3 | Status: CANCELLED | OUTPATIENT
Start: 2021-05-24

## 2021-05-23 RX ADMIN — TICAGRELOR 90 MG: 90 TABLET ORAL at 20:11

## 2021-05-23 RX ADMIN — TICAGRELOR 90 MG: 90 TABLET ORAL at 08:31

## 2021-05-23 RX ADMIN — HEPARIN SODIUM 5000 UNITS: 10000 INJECTION INTRAVENOUS; SUBCUTANEOUS at 05:54

## 2021-05-23 RX ADMIN — ASPIRIN 81 MG: 81 TABLET, COATED ORAL at 08:28

## 2021-05-23 RX ADMIN — Medication 10 ML: at 21:00

## 2021-05-23 RX ADMIN — HEPARIN SODIUM 5000 UNITS: 10000 INJECTION INTRAVENOUS; SUBCUTANEOUS at 15:20

## 2021-05-23 RX ADMIN — ACETAMINOPHEN 650 MG: 325 TABLET ORAL at 09:22

## 2021-05-23 RX ADMIN — ATORVASTATIN CALCIUM 40 MG: 40 TABLET, FILM COATED ORAL at 20:11

## 2021-05-23 RX ADMIN — MORPHINE SULFATE 2 MG: 2 INJECTION, SOLUTION INTRAMUSCULAR; INTRAVENOUS at 20:11

## 2021-05-23 RX ADMIN — HYDRALAZINE HYDROCHLORIDE 10 MG: 10 TABLET, FILM COATED ORAL at 05:54

## 2021-05-23 RX ADMIN — HEPARIN SODIUM 5000 UNITS: 10000 INJECTION INTRAVENOUS; SUBCUTANEOUS at 20:11

## 2021-05-23 RX ADMIN — ISOSORBIDE MONONITRATE 30 MG: 30 TABLET, EXTENDED RELEASE ORAL at 08:26

## 2021-05-23 RX ADMIN — FAMOTIDINE 10 MG: 20 TABLET ORAL at 08:26

## 2021-05-23 ASSESSMENT — PAIN SCALES - GENERAL
PAINLEVEL_OUTOF10: 0
PAINLEVEL_OUTOF10: 1
PAINLEVEL_OUTOF10: 0
PAINLEVEL_OUTOF10: 5
PAINLEVEL_OUTOF10: 4

## 2021-05-23 ASSESSMENT — PAIN DESCRIPTION - PROGRESSION: CLINICAL_PROGRESSION: NOT CHANGED

## 2021-05-23 ASSESSMENT — PAIN - FUNCTIONAL ASSESSMENT: PAIN_FUNCTIONAL_ASSESSMENT: PREVENTS OR INTERFERES SOME ACTIVE ACTIVITIES AND ADLS

## 2021-05-23 ASSESSMENT — PAIN DESCRIPTION - DESCRIPTORS: DESCRIPTORS: DISCOMFORT;DULL

## 2021-05-23 ASSESSMENT — PAIN DESCRIPTION - ONSET: ONSET: GRADUAL

## 2021-05-23 NOTE — PLAN OF CARE
Spoke to patient's sister at bedside to update her on our plan on discharging patient home today. She stated that she is the one that takes care of the patient at home and she is concerned about her getting discharged without having physical therapy see her. She stated that the patient has been here for 4 days and has only gotten out of bed once to the bathroom today and for that she required assistance from 2 nurses. So she is concerned about her going home and then having to start her normal routine (taking a taxi and go to dialysis by herself) while unable to go to the bathroom on her own today. Advised her that we will try to have physical therapy, if available, to come and see her today for evaluation and follow their recommendations. If not available, will have to wait till the morning for PT OT evaluation and need for placement. Patient and sister both agreeable with the plan.     Electronically signed by Catrachita Banks MD on 5/23/2021 at 2:58 PM

## 2021-05-23 NOTE — PROGRESS NOTES
Ste[address]    Attending Physician Statement  I have discussed the case, including pertinent history (medical, surgical, family and social) and exam findings with the resident and the team.  I have seen and examined the patient and the key elements of the encounter have been performed by me. I agree with the assessment, plan and orders as documented by the resident. The patient was seen earlier by me on rounds with the team.    She is doing great today - wearing a lift vest - needs to have cardiac rehab as OP as well - ok to DC as per interventional cardiology - ok to go home today - follow up with Dr Dexter Schilling as PCP in [place] - continue all meds, adjusting doses before DC    I have reviewed my findings and recommendations with Claudia Kinney. Remainder of medical problems as per resident note.       Nancy Cabrales MD, MSHYANNE., [facility]  Internal Medicine Residency Faculty

## 2021-05-23 NOTE — CONSULTS
Met with patient and discussed that their physician has ordered a referral to our outpatient Phase II Cardiac Rehabilitation program. Reviewed the benefits of cardiac rehabilitation based on their diagnosis and personal risk factors. Patient demonstrates moderate interest in Cardiac Rehabilitation at this time. Cardiac Rehabilitation brochure provided to patient/family. The Cardiac Rehabilitation Program has been provided the patient's referral information and pertinent patient details and history. The patient may call [facility] Artie Harrison at [phone] for additional information or questions. Contact information for [facility] [employer] and other choices close to the patient's residence have been provided in the discharge instructions so that the patient may call and schedule an appointment when cleared by their physician.  Thank you for the referral.

## 2021-05-23 NOTE — PROGRESS NOTES
Department of Internal Medicine  Nephrology Attending Progress Note    SUBJECTIVE:  We are following this patient for end-stage renal failure . 5/20: The pt is a 74 yo female who has a pmh of dm, htn, chronic back pain, R breast cancer, esrd on hd mwf, cad with 90% LAD lesion on cath 2012, HFpEF who was at dialysis yesterday and became unresponsive and pulseless. aed was placed and she was in vt and was shocked. She was brought to the er and went into vt again and was placed on a lidocaine drip and heparin. She had RUE weakness and slurred speech but cerebral imaging was negative. According to family she had been having some cp last week. She has been hemodynamically stable. She is having a stress test today.      5/21 :pt seen in icu, sp cath today with pci to rca and lad    5/22: pt seen in icu, no cp or sob today    5/23: seen in icu, feels ok    PROBLEM LIST:    Patient Active Problem List   Diagnosis    Diabetic retinopathy ([address]    Malignant neoplasm of right female breast ([address]    Atherosclerosis of native coronary artery of native heart without angina pectoris    Moderate obesity    Left ventricular hypertrophy    Herniated lumbar intervertebral disc    Lumbar degenerative disc disease    Pseudomeningocele    Lumbar radiculopathy    Lymphedema of arm    CKD (chronic kidney disease) stage 4, GFR 15-29 ml/min ([facility])    Insulin dependent type 2 diabetes mellitus (HCC)    Anemia of chronic disease    Chronic diastolic CHF (congestive heart failure) ([facility])    Neuropathy    Hypertension    Glaucoma    Refusal of blood product    Pancytopenia ([facility])    Controlled type 2 diabetes mellitus with chronic kidney disease on chronic dialysis, with long-term current use of insulin ([facility])    Vitreous hemorrhage ([address]    Patient is Mu-ism    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 ([address]    Hyperkalemia, diminished renal excretion    Chronic pain syndrome    lb 10.2 oz (90.1 kg)   05/19/21 189 lb (85.7 kg)   02/25/21 189 lb (85.7 kg)       Constitutional:  Patient in no acute distress  Head: normocephalic, atraumatic  Cardiovascular: regular rate and rhythm, no murmurs, gallops, or rubs  Respiratory:  Clear, no rales, rhochi, or wheezes  Gastrointestinal: soft, nontender, nondistended  Ext: no edema  Neuro: aaox3  Skin: dry, no rash      DATA:      Recent Labs     05/21/21  0401 05/21/21  2223 05/22/21  0423 05/23/21  0421   WBC 8.3  --  11.4 7.9   HGB 9.1* 9.0* 9.3* 9.1*   HCT 29.4* 29.4* 29.9* 29.3*   MCV 91.0  --  89.8 89.6     --  180 194     Recent Labs     05/21/21  0401 05/22/21  0423 05/23/21 0421    136 131*   K 4.1 4.5 4.3   CL 98 96* 94*   CO2 25 24 25   BUN 21 24* 37*   CREATININE 4.1* 3.8* 4.9*   LABGLOM 13 14 11   GLUCOSE 87 141* 154*   CALCIUM 9.1 9.1 8.4*     Recent Labs     05/21/21  0401 05/22/21  0423 05/23/21 0421   ALT 76* 62* 51*     Lab Results   Component Value Date    LABALBU 3.3 (L) 05/23/2021    LABALBU 3.5 05/22/2021    LABALBU 3.7 05/21/2021       Iron studies:  Lab Results   Component Value Date    FERRITIN 334 11/09/2017    IRON 33 (L) 11/09/2017    TIBC 222 (L) 11/09/2017     Vitamin B-12   Date Value Ref Range Status   02/17/2017 1802 (H) 211 - 946 pg/mL Final     Folate   Date Value Ref Range Status   02/17/2017 >20.0 4.8 - 24.2 ng/mL Final       Bone disease:  Lab Results   Component Value Date    MG 2.1 05/23/2021    PHOS 4.1 05/23/2021     Vit D, 25-Hydroxy   Date Value Ref Range Status   06/20/2017 26 (L) 30 - 100 ng/mL Final     Comment:     <20 ng/mL. ........... Lamin Dangelo Deficient  20-30 ng/mL. ......... Lamin Dangelo Insufficient   ng/mL. ........ Lamin Dangelo Sufficient  >100 ng/mL. .......... Lamin Dangelo Toxic       PTH   Date Value Ref Range Status   06/20/2017 281 (H) 15 - 65 pg/mL Final       No components found for: URIC    Lab Results   Component Value Date    COLORU Yellow 11/07/2017    NITRU Negative 11/07/2017    GLUCOSEU Negative 11/07/2017

## 2021-05-23 NOTE — PROGRESS NOTES
[address] [facility]   Department of Internal Medicine   Internal Medicine Residency  MICU Progress Note    Patient:  Caryle Sicks 73 y.o. female   MRN: [id number]       Date of Service: 2021    Allergy: Lasix [furosemide]  Cc follow CAD  Subjective     She stated that chest pain much improved; She denies any nausea, vomiting, headache, dizziness, palpitations  No acute overnight events     Objective     TEMPERATURE:  Current - Temp: 98.4 °F (36.9 °C); Max - Temp  Av.4 °F (36.9 °C)  Min: 98.2 °F (36.8 °C)  Max: 98.5 °F (36.9 °C)  RESPIRATIONS RANGE: Resp  Av.8  Min: 16  Max: 24  PULSE RANGE: Pulse  Av.1  Min: 56  Max: 64  BLOOD PRESSURE RANGE:  Systolic (02LZC), CSS:744 , Min:85 , SRU:912   ; Diastolic (28NQN), IAZ:12, Min:36, Max:72    PULSE OXIMETRY RANGE: SpO2  Av %  Min: 89 %  Max: 100 %    I & O - 24hr:    Intake/Output Summary (Last 24 hours) at 2021 0721  Last data filed at 2021 1700  Gross per 24 hour   Intake 1500 ml   Output --   Net 1500 ml     I/O last 3 completed shifts: In: 1500 [P.O.:1250; I.V.:250]  Out: -  No intake/output data recorded.    Weight change:     Physical Examination:  General: alert, awake, in no acute distress  HEENT: NC, AT, moist oral mucosa  Neck: supple  Pulmonary: clear to auscultation bilaterally, no wheezing or crackles  CV: RRR, S1 S2 heard, no MRG  Abd: soft, nontender, nondistended, BS +  Ext: no pedal edema  Musculoskeletal: right shoulder pain; right hand weakness due to pain  Neuro: Alert, awake, no gross focal neurological deficit      Medications     Continuous Infusions:   sodium chloride      sodium chloride      dextrose       Scheduled Meds:   sodium chloride flush  5-40 mL Intravenous 2 times per day    metoprolol succinate  25 mg Oral BID    isosorbide mononitrate  30 mg Oral Daily    hydrALAZINE  10 mg Oral 3 times per day    heparin (porcine)  5,000 Units Subcutaneous 3 times per day    ticagrelor  90 mg Oral BID    famotidine  10 mg Oral Daily    aspirin  81 mg Oral Daily    atorvastatin  40 mg Oral Nightly     PRN Meds: sodium chloride flush, sodium chloride, polyethylene glycol, morphine, acetaminophen, sodium chloride flush, sodium chloride, glucose, dextrose, glucagon (rDNA), dextrose  Nutrition:   Cardiac diet    Labs and Imaging Studies     CBC:   Recent Labs     05/21/21  0401 05/21/21  2223 05/22/21 0423 05/23/21 0421   WBC 8.3  --  11.4 7.9   HGB 9.1* 9.0* 9.3* 9.1*   HCT 29.4* 29.4* 29.9* 29.3*   MCV 91.0  --  89.8 89.6     --  180 194       BMP:    Recent Labs     05/21/21  0401 05/22/21  0423 05/23/21  0421    136 131*   K 4.1 4.5 4.3   CL 98 96* 94*   CO2 25 24 25   BUN 21 24* 37*   CREATININE 4.1* 3.8* 4.9*   GLUCOSE 87 141* 154*       LIVER PROFILE:   Recent Labs     05/21/21  0401 05/22/21  0423 05/23/21  0421   AST 45* 28 21   ALT 76* 62* 51*   BILIDIR 0.3 0.3 <0.2   BILITOT 0.7 0.8 0.5   ALKPHOS 111* 108* 97       PT/INR:   No results for input(s): PROTIME, INR in the last 72 hours.     APTT:   Recent Labs     05/20/21  0806 05/21/21 0401   APTT 76.8* 92.9*       Fasting Lipid Panel:    Lab Results   Component Value Date    CHOL 101 05/19/2021    TRIG 106 05/19/2021    HDL 38 05/19/2021       Cardiac Enzymes:    Lab Results   Component Value Date    CKTOTAL 58 05/20/2021    CKTOTAL 63 05/20/2021    CKTOTAL 67 05/19/2021    CKMB 2.7 05/20/2021    CKMB 3.5 05/20/2021    CKMB 4.3 05/19/2021    TROPONINI 0.83 (H) 05/20/2021    TROPONINI 0.81 (H) 05/20/2021    TROPONINI 0.82 (H) 05/19/2021       Notable Cultures:      Blood cultures   Blood Culture, Routine   Date Value Ref Range Status   11/15/2017 5 Days- no growth  Final     Respiratory cultures No results found for: RESPCULTURE   Gram Stain Result   Date Value Ref Range Status   10/12/2014 Refer to ordered Gram stain for results  Final     Urine   Urine Culture, Routine   Date Value Ref Range Status   11/07/2017 Growth not present Final     Legionella No results found for: LABLEGI  C Diff PCR No results found for: CDIFPCR  Wound culture/abscess: No results for input(s): WNDABS in the last 72 hours. Tip culture:No results for input(s): CXCATHTIP in the last 72 hours. Oxygen:     Vent Information  SpO2: 100 %  Additional Respiratory  Assessments  Pulse: 58  Resp: 19  SpO2: 100 %       [REMOVED] Urethral Catheter-Output (mL): 5 mL    Imaging Studies:    NM Cardiac Stress Test Nuclear Imaging   Final Result      The myocardial perfusion imaging was abnormal      The abnormality was a large fixed defect in the apical and septal   walls; small fixed defect in the inferoapical wall; partially   reversible defect in the small area of anterolateral wall. Overall left ventricular systolic function was severely reduced, EF   25% with apical septal akinesis, moderate global hypokinesis. Dilated   left ventricle during stress and rest.      High risk myocardial perfusion study      Compared to previous study from August 2011 which showed large lateral   wall ischemia with EF 66%. Pearl Sousa MD, [facility]                     XR CHEST PORTABLE   Final Result   Cardiomegaly with pulmonary vascular congestive changes. Left-sided internal jugular line tip terminates in the region of the left   aortic arch and should be advanced. Stable positioning of right sided port a catheter tip in the SVC. XR CHEST PORTABLE   Final Result   No acute process. Cardiomegaly. CTA HEAD W CONTRAST   Final Result   1. Mild atherosclerotic disease . No large vessel occlusion   identified   2. Estimated stenosis of the proximal right and left internal carotid   artery by NASCET criteria is not hemodynamically significant         This study was analyzed by the Viz. ai algorithm. CTA NECK W CONTRAST   Final Result   1. Mild atherosclerotic disease . No large vessel occlusion   identified   2.  Estimated stenosis of MD Nikolas,FCCP  Pulmonary&Critical Care Medicine   Director of [facility] Director of [facility]    Rose Asif

## 2021-05-24 PROBLEM — I47.20 V-TACH (HCC): Status: ACTIVE | Noted: 2021-05-24

## 2021-05-24 LAB
ALBUMIN SERPL-MCNC: 3.1 G/DL (ref 3.5–5.2)
ALP BLD-CCNC: 90 U/L (ref 35–104)
ALT SERPL-CCNC: 45 U/L (ref 0–32)
ANION GAP SERPL CALCULATED.3IONS-SCNC: 16 MMOL/L (ref 7–16)
AST SERPL-CCNC: 18 U/L (ref 0–31)
BASOPHILS ABSOLUTE: 0.03 E9/L (ref 0–0.2)
BASOPHILS RELATIVE PERCENT: 0.4 % (ref 0–2)
BILIRUB SERPL-MCNC: 0.4 MG/DL (ref 0–1.2)
BILIRUBIN DIRECT: <0.2 MG/DL (ref 0–0.3)
BILIRUBIN, INDIRECT: ABNORMAL MG/DL (ref 0–1)
BUN BLDV-MCNC: 47 MG/DL (ref 6–23)
CALCIUM SERPL-MCNC: 8.8 MG/DL (ref 8.6–10.2)
CHLORIDE BLD-SCNC: 96 MMOL/L (ref 98–107)
CO2: 24 MMOL/L (ref 22–29)
CREAT SERPL-MCNC: 6.1 MG/DL (ref 0.5–1)
EOSINOPHILS ABSOLUTE: 0.17 E9/L (ref 0.05–0.5)
EOSINOPHILS RELATIVE PERCENT: 2.3 % (ref 0–6)
GFR AFRICAN AMERICAN: 8
GFR NON-AFRICAN AMERICAN: 8 ML/MIN/1.73
GLUCOSE BLD-MCNC: 142 MG/DL (ref 74–99)
HCT VFR BLD CALC: 29 % (ref 34–48)
HEMOGLOBIN: 9.2 G/DL (ref 11.5–15.5)
IMMATURE GRANULOCYTES #: 0.05 E9/L
IMMATURE GRANULOCYTES %: 0.7 % (ref 0–5)
LYMPHOCYTES ABSOLUTE: 1.06 E9/L (ref 1.5–4)
LYMPHOCYTES RELATIVE PERCENT: 14.3 % (ref 20–42)
MAGNESIUM: 2.7 MG/DL (ref 1.6–2.6)
MCH RBC QN AUTO: 28.1 PG (ref 26–35)
MCHC RBC AUTO-ENTMCNC: 31.7 % (ref 32–34.5)
MCV RBC AUTO: 88.7 FL (ref 80–99.9)
METER GLUCOSE: 122 MG/DL (ref 74–99)
METER GLUCOSE: 123 MG/DL (ref 74–99)
METER GLUCOSE: 219 MG/DL (ref 74–99)
METER GLUCOSE: 258 MG/DL (ref 74–99)
MONOCYTES ABSOLUTE: 1.09 E9/L (ref 0.1–0.95)
MONOCYTES RELATIVE PERCENT: 14.7 % (ref 2–12)
NEUTROPHILS ABSOLUTE: 5.01 E9/L (ref 1.8–7.3)
NEUTROPHILS RELATIVE PERCENT: 67.6 % (ref 43–80)
PDW BLD-RTO: 16.8 FL (ref 11.5–15)
PHOSPHORUS: 4.1 MG/DL (ref 2.5–4.5)
PLATELET # BLD: 207 E9/L (ref 130–450)
PMV BLD AUTO: 11.3 FL (ref 7–12)
POTASSIUM SERPL-SCNC: 4.5 MMOL/L (ref 3.5–5)
RBC # BLD: 3.27 E12/L (ref 3.5–5.5)
SODIUM BLD-SCNC: 136 MMOL/L (ref 132–146)
TOTAL PROTEIN: 6.1 G/DL (ref 6.4–8.3)
WBC # BLD: 7.4 E9/L (ref 4.5–11.5)

## 2021-05-24 PROCEDURE — 6370000000 HC RX 637 (ALT 250 FOR IP): Performed by: INTERNAL MEDICINE

## 2021-05-24 PROCEDURE — 2580000003 HC RX 258: Performed by: INTERNAL MEDICINE

## 2021-05-24 PROCEDURE — 84100 ASSAY OF PHOSPHORUS: CPT

## 2021-05-24 PROCEDURE — 80076 HEPATIC FUNCTION PANEL: CPT

## 2021-05-24 PROCEDURE — 97530 THERAPEUTIC ACTIVITIES: CPT

## 2021-05-24 PROCEDURE — 6360000002 HC RX W HCPCS: Performed by: INTERNAL MEDICINE

## 2021-05-24 PROCEDURE — 90935 HEMODIALYSIS ONE EVALUATION: CPT

## 2021-05-24 PROCEDURE — 80048 BASIC METABOLIC PNL TOTAL CA: CPT

## 2021-05-24 PROCEDURE — 82962 GLUCOSE BLOOD TEST: CPT

## 2021-05-24 PROCEDURE — 2060000000 HC ICU INTERMEDIATE R&B

## 2021-05-24 PROCEDURE — 85025 COMPLETE CBC W/AUTO DIFF WBC: CPT

## 2021-05-24 PROCEDURE — 97165 OT EVAL LOW COMPLEX 30 MIN: CPT

## 2021-05-24 PROCEDURE — 99232 SBSQ HOSP IP/OBS MODERATE 35: CPT | Performed by: INTERNAL MEDICINE

## 2021-05-24 PROCEDURE — 97535 SELF CARE MNGMENT TRAINING: CPT

## 2021-05-24 PROCEDURE — 97161 PT EVAL LOW COMPLEX 20 MIN: CPT

## 2021-05-24 PROCEDURE — 83735 ASSAY OF MAGNESIUM: CPT

## 2021-05-24 RX ORDER — MIDODRINE HYDROCHLORIDE 5 MG/1
5 TABLET ORAL PRN
Status: DISCONTINUED | OUTPATIENT
Start: 2021-05-24 | End: 2021-05-25 | Stop reason: HOSPADM

## 2021-05-24 RX ADMIN — HEPARIN SODIUM 5000 UNITS: 10000 INJECTION INTRAVENOUS; SUBCUTANEOUS at 14:05

## 2021-05-24 RX ADMIN — HYDRALAZINE HYDROCHLORIDE 10 MG: 10 TABLET, FILM COATED ORAL at 14:05

## 2021-05-24 RX ADMIN — TICAGRELOR 90 MG: 90 TABLET ORAL at 09:57

## 2021-05-24 RX ADMIN — ASPIRIN 81 MG: 81 TABLET, COATED ORAL at 09:57

## 2021-05-24 RX ADMIN — FAMOTIDINE 10 MG: 20 TABLET ORAL at 09:58

## 2021-05-24 RX ADMIN — ISOSORBIDE MONONITRATE 30 MG: 30 TABLET, EXTENDED RELEASE ORAL at 09:19

## 2021-05-24 RX ADMIN — HEPARIN SODIUM 5000 UNITS: 10000 INJECTION INTRAVENOUS; SUBCUTANEOUS at 05:10

## 2021-05-24 RX ADMIN — ATORVASTATIN CALCIUM 40 MG: 40 TABLET, FILM COATED ORAL at 23:59

## 2021-05-24 RX ADMIN — Medication 10 ML: at 09:57

## 2021-05-24 RX ADMIN — TICAGRELOR 90 MG: 90 TABLET ORAL at 23:59

## 2021-05-24 ASSESSMENT — PAIN SCALES - GENERAL
PAINLEVEL_OUTOF10: 0

## 2021-05-24 NOTE — PROGRESS NOTES
[facility]  Internal Medicine Residency Program  Progress Note - House Team 2    Patient:  Catherine Cobian 73 y.o. female MRN: [id number]     Date of Service: 5/24/2021     CC: V. tach/V. fib arrest x2  Overnight events: No acute events overnight    Subjective     Patient was seen and examined at bedside this morning. Reports feeling well with no chest pain noted. Patient tolerated HD well today. She was transferred to [facility] today and is pending PT/OT evaluation per her sister's request.    Objective     Physical Exam:  Vitals: BP (!) 99/53   Pulse 75   Temp 98.1 °F (36.7 °C)   Resp 20   Ht 5' 10\" (1.778 m)   Wt 186 lb 8.2 oz (84.6 kg)   SpO2 91%   BMI 26.76 kg/m²     I & O - 24hr: I/O this shift:  In: -   Out: 3300    General Appearance: alert, appears stated age and cooperative  HEENT:  Head: Normocephalic, no lesions, without obvious abnormality. Neck: no adenopathy, no carotid bruit, no JVD, supple, symmetrical, trachea midline and thyroid not enlarged, symmetric, no tenderness/mass/nodules  Lung: clear to auscultation bilaterally  Heart: regular rate and rhythm, S1, S2 normal, no murmur, click, rub or gallop  Abdomen: soft, non-tender; bowel sounds normal; no masses,  no organomegaly  Extremities:  extremities normal, atraumatic, no cyanosis or edema  Musculokeletal: No joint swelling, no muscle tenderness. ROM normal in all joints of extremities.    Neurologic: Mental status: Alert, oriented, thought content appropriate  Subject  Pertinent Labs & Imaging Studies   aleks  CBC with Differential:    Lab Results   Component Value Date    WBC 7.4 05/24/2021    RBC 3.27 05/24/2021    HGB 9.2 05/24/2021    HCT 29.0 05/24/2021     05/24/2021    MCV 88.7 05/24/2021    MCH 28.1 05/24/2021    MCHC 31.7 05/24/2021    RDW 16.8 05/24/2021    NRBC 0.0 02/18/2017    SEGSPCT 70 03/12/2014    BANDSPCT 1 02/18/2015    LYMPHOPCT 14.3 05/24/2021    PROMYELOPCT 1.8 02/18/2017    MONOPCT 14.7 recommendations      DVT prophylaxis/ GI prophylaxis: Heparin / diet, Yara Conner MD, PGY-1  Attending physician: Dr. Del Valle Fairly

## 2021-05-24 NOTE — PROGRESS NOTES
eval and treat Start: 05/24/21 0745, End: 05/24/21 0745, ONE TIME, Standing Count: 1 Occurrences, R      Gaye Rod MD     Diagnosis:  Cardiac arrest [facility]) [I46.9]  Specific instructions for next treatment:  Progress ambulation distance. Stair training if appropriate. Progress towards independent level. Current Treatment Recommendations:   [] Strengthening to improve independence with functional mobility   [] ROM to improve independence with functional mobility   [] Balance Training to improve static/dynamic balance and to reduce fall risk  [] Endurance Training to improve activity tolerance during functional mobility   [] Transfer Training to improve safety and independence with all functional transfers   [] Gait Training to improve gait mechanics, endurance and asses need for appropriate assistive device  [] Stair Training in preparation for safe discharge home and/or into the community   [] Positioning to prevent skin breakdown and contractures  [] Safety and Education Training   [] Patient/Caregiver Education   [] HEP  [] Other     PT long term treatment goals are located in above grid    Frequency of treatments: 2-5x/week x 1-2 weeks. Time in  1300  Time out  1305  Time in 1340  Time out 1400    Total Treatment Time  10 minutes     Evaluation Time includes thorough review of current medical information, gathering information on past medical history/social history and prior level of function, completion of standardized testing/informal observation of tasks, assessment of data and education on plan of care and goals.     CPT codes:  [x] Low Complexity PT evaluation 64222  [] Moderate Complexity PT evaluation 78801  [] High Complexity PT evaluation 12870  [] PT Re-evaluation 23298  [] Gait training 24847 - minutes  [] Manual therapy 57984 - minutes  [x] Therapeutic activities 40108 10 minutes  [] Therapeutic exercises 59496 - minutes  [] Neuromuscular reeducation 28080 - minutes     Anne Oseguera, PT, DPT  [id number]

## 2021-05-24 NOTE — PROGRESS NOTES
[facility]  Internal Medicine Residency Program  Progress Note - House Team 2    Patient:  Eden Roberts 73 y.o. female MRN: [id number]     Date of Service: 5/23/2021     CC: Vtach/Vfib arrest x2   Overnight events: Madeleine Sanchez is a 73 y.o. female with PMH of HTN, T2DM, CAD w/ cath showing 90% stenosis of L circumflex, ESRD on HD, R breast cancer s/p mastectomy w/ residual lymphedema, and HFpEF (EF 60-65%), who presented to the ED with cardiac arrest.    On day 4 of hospital admission   Patient was seen at bed side in the am.   Awake, alert, responding to questions appropriately. Mild R sided musculoskeletal chest pain. Denies dyspnea/cough, abdominal pain. Objective     Physical Exam:  Vitals: BP 94/66   Pulse 59   Temp 99.5 °F (37.5 °C) (Temporal)   Resp 15   Ht 5' 10\" (1.778 m)   Wt 198 lb 10.2 oz (90.1 kg)   SpO2 94%   BMI 28.50 kg/m²       Constitutional: Alert, conversational. Sally Kelch commands. In no apparent distress. Head: Normocephalic and atraumatic. Eyes: Conjunctiva normal, (-) scleral icterus. Mucus membranes moist.  Mouth: Mucus membranes moist. Oropharynx clear. No deviation of the tongue or uvula. Neck: (-) swelling, trachea midline  Respiratory: Lungs clear to auscultation bilaterally. (-)  wheezes, (-)  rales, (-)  Rhonchi. No increased work of breathing or respiratory distress. Cardiovascular: RRR. (-) murmurs, (-) gallops, (-) rubs. S1 and S2 were normal. L AV fistula present, R-sided mediport in place. GI:  Abdomen soft, non-tender, non-distended. (+) BS. (-) guarding, (-) rigidity. Extremities: R-sided cath site covered with a clean, dry bandage. Compartments soft and no hematoma detected. Pulses intact. Warm and well perfused. (-) clubbing, (-) cyanosis. (-) peripheral edema. (-) sacral pitting edema. Neurologic: No focal neurological deficits.  No speech slurring or tremor    Pertinent Labs & Imaging Studies   aleks  CBC with Differential:    Lab Results   Component Value Date    WBC 7.9 05/23/2021    RBC 3.27 05/23/2021    HGB 9.1 05/23/2021    HCT 29.3 05/23/2021     05/23/2021    MCV 89.6 05/23/2021    MCH 27.8 05/23/2021    MCHC 31.1 05/23/2021    RDW 17.1 05/23/2021    NRBC 0.0 02/18/2017    SEGSPCT 70 03/12/2014    BANDSPCT 1 02/18/2015    LYMPHOPCT 15.1 05/23/2021    PROMYELOPCT 1.8 02/18/2017    MONOPCT 10.0 05/23/2021    MYELOPCT 0.9 10/24/2017    BASOPCT 0.3 05/23/2021    MONOSABS 0.79 05/23/2021    LYMPHSABS 1.19 05/23/2021    EOSABS 0.10 05/23/2021    BASOSABS 0.02 05/23/2021     BMP:    Lab Results   Component Value Date     05/23/2021    K 4.3 05/23/2021    K 3.9 05/19/2021    CL 94 05/23/2021    CO2 25 05/23/2021    BUN 37 05/23/2021    LABALBU 3.3 05/23/2021    LABALBU 3.8 04/02/2012    CREATININE 4.9 05/23/2021    CALCIUM 8.4 05/23/2021    GFRAA 11 05/23/2021    LABGLOM 11 05/23/2021    GLUCOSE 154 05/23/2021    GLUCOSE 46 04/02/2012     Hepatic Function Panel:    Lab Results   Component Value Date    ALKPHOS 97 05/23/2021    ALT 51 05/23/2021    AST 21 05/23/2021    PROT 6.0 05/23/2021    BILITOT 0.5 05/23/2021    BILIDIR <0.2 05/23/2021    IBILI see below 05/23/2021    LABALBU 3.3 05/23/2021    LABALBU 3.8 04/02/2012     Magnesium:    Lab Results   Component Value Date    MG 2.1 05/23/2021     Phosphorus:    Lab Results   Component Value Date    PHOS 4.1 05/23/2021     PTT:    Lab Results   Component Value Date    APTT 92.9 05/21/2021     Last 3 Troponin:    Lab Results   Component Value Date    TROPONINI 0.83 05/20/2021    TROPONINI 0.81 05/20/2021    TROPONINI 0.82 05/19/2021     Resident's Assessment and Plan     Assessment     Vtach/Vfib arrest, r/o ischemic cause in setting of multiple cardiac risk factors  Prior heart cath showing 90% stenosis of L circumflex artery - medically managed   Admission labs showed no severe electrolyte dyscrasias     CAD s/p stent to LAD and RCA     Acute encephalopathy in the setting of #1 - resolved   Pt initially demonstrating retrograde amnesia, short attention span -resolved     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     HFrEF 2/2 ICM   Echo from 5/21 showing EF 30% +/- 5%, stage III DD     Hx T2DM  Hgb A1c 6.6% as of 5/19/2021     Hx ESRD on HD    Plan     Continue DAPT   Continue BB, ASA, statin per Cardiology  Continue Imdur and Hydralazine, will adjust dosage as BP permits   Will need LifeVest as outpatient for at least 3 months   Cardiac Rehab after d/c - consult placed today   Clinical correlation needed for possible cardiac fibroelastoma - consider further imaging/workup as outpatient   Nephrology, Neurology, Cardiology following, appreciate further recs   Continue HD while inpatient  Hgb/Hct q12  Daily labs - replete electrolytes as necessary     PT/OT evaluation: Consulted   DVT prophylaxis/ GI prophylaxis: Heparin / Protonix   Disposition: Continue current medical management      Merced Moraes DO, PGY-1  Attending physician: Dr. Faizan Boateng

## 2021-05-24 NOTE — FLOWSHEET NOTE
Pt ran 4 hours; 1303 bath; 3 L removed; stable/tolerated well; denies c/o. Needles removed & patent hemostasis achieved < 10 min, DSD applied positive T/B post Tx. Good Access flow no issues achieving prescribed BFR. Next Tx scheduled Wednesday 5/26. Positive refill via [facility] in last 15 min of Tx with 0.5   Hct change. 05/24/21 1145   Vital Signs   BP (!) 114/58   Temp 98.1 °F (36.7 °C)   Pulse 62   Resp 19   SpO2 93 %   Weight 186 lb 8.2 oz (84.6 kg)   Percent Weight Change -3.36   Pain Assessment   Pain Assessment 0-10   Pain Level 0   Post-Hemodialysis Assessment   Post-Treatment Procedures Blood returned; Access bleeding time < 10 minutes   Machine Disinfection Process Exterior Machine Disinfection   Rinseback Volume (ml) 300 ml   Total Liters Processed (l/min) 92.3 l/min   Dialyzer Clearance Lightly streaked   Duration of Treatment (minutes) 240 minutes   Hemodialysis Output (ml) 3300 ml   NET Removed (ml) 3000 ml   Tolerated Treatment Good   Patient Response to Treatment \"B\" Profile for duration   Bilateral Breath Sounds Diminished   RUE Edema +3   Physician Notified?  Yes

## 2021-05-24 NOTE — PLAN OF CARE
Updated HPI     Natalie Downing is a 74 y/o female with a PMHx of HTN, HLD, T2DM w/ retinopathy/neuropathy, R breast cancer s/p mastectomy with residual lymphedema, CAD s/p cath in 2011/2012 showing 90% stenosis in LCX, and ESRD on HD, who was admitted on 5/19/2021 for VT/Vfib arrest while in HD. Per note review, she was receiving HD when she was noted to become unresponsive, no pulse. She was shocked once in the field w/ ROSC by EMS, and transferred immediately to [facility]. While in the ED, she was awake and answering questions, but was noted to be altered and with retrograde amnesia and new RUE weakness. A stroke alert was called and the pt underwent CT head, CTA head/neck, and CTA brain perfusion - all of which were negative for acute stroke. Shortly after returning from radiology, the pt was noted to fall back into Vtach while being evaluated by Cardiology. She was placed on Lidocaine gtt and transferred to MICU for further management and stabilization. In MICU, she was started on ASA, BB, and heparin gtt. She underwent stress test, which showed a large fixed defect in the apical and septal walls, as well as small fixed defect in the inferoapical wall and a reversible defect in the anterolateral wall, EF approximately 25%. She underwent LHC the following morning, during which time it was found that she had 95% stenosis of the LAD, 100% stenosis of the cirfumflex, and 90% stenosis of the RCA. She underwent PCI with MARYLOU to LAD and RCA. Since the time of her cardiac catheterization, Ms. Kinney has continued to improve clinically. She is currently maintained on DAPT (ASA and Brilinta), statin, Metoprolol, Hydralazine, and Imdur. Cardiac rehab has seen the pt and will follow as outpatient. Of note, Echo performed 5/21/2021 showed EF 30% +/- 5% with stage III DD, incidentally noting as well as 1.0cm x 0.5cm mass on the ventricular aspect of the mitral valve suggestive of fibroelastoma.  No recommendations were made by Cardiology regarding further workup while inpatient. Initially, the plan was to discharge the patient from MICU on 5/23/2021. However, the pt's sister was concerned about her functional status as she had required two-nurse assist to stand and walk to the restroom. She was requesting PT eval for the patient prior to discharge. As such, she will likely be able to d/c once seen by PT tomorrow AM.     To follow:   1. Likely d/c tomorrow - please complete med rec with correct dosages of the pt's antihypertensives - they were being altered today d/t pt's hypotension. 2. Pt to follow up as outpatient with Cardiology and PCP - follow-up appts already added to discharge materials - please ensure that it is documented in d/c summary that PCP and Cardiology should consider further workup for potential fibroelastoma     3.  Please monitor BP and follow-up with PT for further recommendations     Angelique Stoner DO, PGY-1   5/23/2021

## 2021-05-24 NOTE — PROGRESS NOTES
heparin (porcine)  5,000 Units Subcutaneous 3 times per day    ticagrelor  90 mg Oral BID    famotidine  10 mg Oral Daily    aspirin  81 mg Oral Daily    atorvastatin  40 mg Oral Nightly     PRN Meds: sodium chloride flush, sodium chloride, polyethylene glycol, morphine, acetaminophen, sodium chloride flush, sodium chloride, glucose, dextrose, glucagon (rDNA), dextrose  Nutrition:   Cardiac diet    Labs and Imaging Studies     CBC:   Recent Labs     05/22/21 0423 05/23/21 0421 05/24/21 0424   WBC 11.4 7.9 7.4   HGB 9.3* 9.1* 9.2*   HCT 29.9* 29.3* 29.0*   MCV 89.8 89.6 88.7    194 207       BMP:    Recent Labs     05/22/21 0423 05/23/21 0421 05/24/21 0424    131* 136   K 4.5 4.3 4.5   CL 96* 94* 96*   CO2 24 25 24   BUN 24* 37* 47*   CREATININE 3.8* 4.9* 6.1*   GLUCOSE 141* 154* 142*       LIVER PROFILE:   Recent Labs     05/22/21 0423 05/23/21 0421 05/24/21 0424   AST 28 21 18   ALT 62* 51* 45*   BILIDIR 0.3 <0.2 <0.2   BILITOT 0.8 0.5 0.4   ALKPHOS 108* 97 90       PT/INR:   No results for input(s): PROTIME, INR in the last 72 hours. APTT:   No results for input(s): APTT in the last 72 hours.     Fasting Lipid Panel:    Lab Results   Component Value Date    CHOL 101 05/19/2021    TRIG 106 05/19/2021    HDL 38 05/19/2021       Cardiac Enzymes:    Lab Results   Component Value Date    CKTOTAL 58 05/20/2021    CKTOTAL 63 05/20/2021    CKTOTAL 67 05/19/2021    CKMB 2.7 05/20/2021    CKMB 3.5 05/20/2021    CKMB 4.3 05/19/2021    TROPONINI 0.83 (H) 05/20/2021    TROPONINI 0.81 (H) 05/20/2021    TROPONINI 0.82 (H) 05/19/2021       Notable Cultures:      Blood cultures   Blood Culture, Routine   Date Value Ref Range Status   11/15/2017 5 Days- no growth  Final     Respiratory cultures No results found for: RESPCULTURE   Gram Stain Result   Date Value Ref Range Status   10/12/2014 Refer to ordered Gram stain for results  Final     Urine   Urine Culture, Routine   Date Value Ref Range Status 11/07/2017 Growth not present  Final     Legionella No results found for: LABLEGI  C Diff PCR No results found for: CDIFPCR  Wound culture/abscess: No results for input(s): WNDABS in the last 72 hours. Tip culture:No results for input(s): CXCATHTIP in the last 72 hours. Oxygen:     Vent Information  SpO2: 93 %  Additional Respiratory  Assessments  Pulse: 60  Resp: 17  SpO2: 93 %       [REMOVED] Urethral Catheter-Output (mL): 5 mL    Imaging Studies:    NM Cardiac Stress Test Nuclear Imaging   Final Result      The myocardial perfusion imaging was abnormal      The abnormality was a large fixed defect in the apical and septal   walls; small fixed defect in the inferoapical wall; partially   reversible defect in the small area of anterolateral wall. Overall left ventricular systolic function was severely reduced, EF   25% with apical septal akinesis, moderate global hypokinesis. Dilated   left ventricle during stress and rest.      High risk myocardial perfusion study      Compared to previous study from August 2011 which showed large lateral   wall ischemia with EF 66%. Joanna Fuentes MD, [facility]                     XR CHEST PORTABLE   Final Result   Cardiomegaly with pulmonary vascular congestive changes. Left-sided internal jugular line tip terminates in the region of the left   aortic arch and should be advanced. Stable positioning of right sided port a catheter tip in the SVC. XR CHEST PORTABLE   Final Result   No acute process. Cardiomegaly. CTA HEAD W CONTRAST   Final Result   1. Mild atherosclerotic disease . No large vessel occlusion   identified   2. Estimated stenosis of the proximal right and left internal carotid   artery by NASCET criteria is not hemodynamically significant         This study was analyzed by the [employer] algorithm. CTA NECK W CONTRAST   Final Result   1. Mild atherosclerotic disease .  No large vessel occlusion identified   2. Estimated stenosis of the proximal right and left internal carotid   artery by NASCET criteria is not hemodynamically significant         This study was analyzed by the pinnacle-ec[employer]. ai algorithm. CT BRAIN PERFUSION   Final Result      No significant ischemic penumbra identified      This study was analyzed by the pinnacle-ecs. ai algorithm. CT HEAD WO CONTRAST   Final Result   No acute intracranial abnormality. Resident's Assessment and Plan      Jaden Whitaker is 73 y.o. female with a PMH of hypertension, hyperlipidemia, obesity, type 2 diabetes mellitus with neuropathy/retinopathy, right breast cancer s/p mastectomy/XRT with right upper extremity lymphedema, bladder cancer s/p chemotherapy, right-sided Mediport in place. ESRD on dialysis, CAD s/p C 2011 (90% stenosis of LCx no PCI), spinal cord disease s/p spinal implant presented after being found unresponsive at the time of dialysis and received shock on time due to shockable rhythm on monitor. Assessment:   1. S/p cardiac arrest; received shock x1, rhythm unknown; likely pulseless v tack or ventricular fibrillation  2. Sustained ventricular tachycardia; was on lidocaine drip  3. CAD s/p LHC revealing 90% stenosis or LCx (2011); echocardiography revealing EF of 60-65%, moderate LVH, aortic valve sclerosis (12/2020); Stress test positive for large fixed defect in apical and septal walls, small fixed defect in the inferoapical wall, partially reversible defect in the small area of anterolateral wall; EF of 25% with apical septal akinesis, moderate global hypokinesia, dilated left ventricle        Significant drop in EF from 66% (11/20)--> 25%  · Cardiac catheterization revealing- Left main:  20% eccentric mid vessel narrowing, LAD:  50% proximal vessel narrowing and 95% mid vessel stenosis.    · Trivial diagonal branch with 90% stenosis, LCX:  Occluded, RCA:  Large, dominant vessel with 90% heavily calcified mid vessel stenosis, 50% disease at the level of the crux and 70% ostial stenosis of the posterior descending artery branch  · S/p LAD, mid RCA MARYLOU stent placement (5/21)    4. Elevated troponin in the setting of ERIKA likely secondary to NSTEMI; intermittent chest pain for the past 2 weeks; Troponin 0.87--> 0.81  5. Acute metabolic encephalopathy likely due to cardiac arrest/ metabolic derangement; resolved  6. Slurring of speech and right sided weakness likely due to TIA; CT head, CT cervical, CT brain unremarkable  7. ESRD on HD; MWF; Baseline creatinine of 3.8-4.5  8. Right shoulder pain due to osteoarthritis  9. History of right breast carcinoma, s/p mastectomy, radiotherapy with residual right upper extremity lymphedema  10. History of bladder carcinoma, S/p chemotherapy ( around 2009, 2010)  6. Hoahaoism    Plan:  · DAPT per cardiology with aspirin and brillinta  · IMDUR and hydralazine with holding parameters for BP  · Continue metoprolol XL, atorvastatin  · On life Vest per cardilogy  · Monitor hemoglobin, recommend keeping hgb above 8 for ACS  · Nephrology on board; HD per nephrology  · Cardiac rehab as outpatient  · Aspiration/fall precautions    # Peptic ulcer prophylaxis: Pantoprazole  # DVT Prophylaxis: Heparin   # Disposition: Transfer to telemetry    Sylwia Navarrete MD, PGY-2  Attending Physician: Dr. Adriana Doll personally saw, examined and provided care for the patient. Radiographs, labs and medication list were reviewed by me independently. I spoke with bedside nursing, therapists and consultants. Critical care services and times documented are independent of procedures and multidisciplinary rounds with Residents. Additionally comprehensive, multidisciplinary rounds were conducted with the MICU team. The case was discussed in detail and plans for care were established. Review of Residents documentation was conducted and revisions were made as appropriate.  I agree with the above documented exam, problem

## 2021-05-24 NOTE — PROGRESS NOTES
Department of Internal Medicine  Nephrology Attending Progress Note    SUBJECTIVE:  We are following this patient for end-stage renal failure . 5/20: The pt is a 72 yo female who has a pmh of dm, htn, chronic back pain, R breast cancer, esrd on hd mwf, cad with 90% LAD lesion on cath 2012, HFpEF who was at dialysis yesterday and became unresponsive and pulseless. aed was placed and she was in vt and was shocked. She was brought to the er and went into vt again and was placed on a lidocaine drip and heparin. She had RUE weakness and slurred speech but cerebral imaging was negative. According to family she had been having some cp last week. She has been hemodynamically stable. She is having a stress test today. 5/21 :pt seen in icu, sp cath today with pci to rca and lad    5/22: pt seen in icu, no cp or sob today    5/23: seen in icu, feels ok    5/24: pt seen in icu, no cp or sob.  Chest is less sore    PROBLEM LIST:    Patient Active Problem List   Diagnosis    Diabetic retinopathy ([address]    Malignant neoplasm of right female breast ([address]    Atherosclerosis of native coronary artery of native heart without angina pectoris    Moderate obesity    Left ventricular hypertrophy    Herniated lumbar intervertebral disc    Lumbar degenerative disc disease    Pseudomeningocele    Lumbar radiculopathy    Lymphedema of arm    CKD (chronic kidney disease) stage 4, GFR 15-29 ml/min ([facility])    Insulin dependent type 2 diabetes mellitus (HCC)    Anemia of chronic disease    Chronic diastolic CHF (congestive heart failure) ([facility])    Neuropathy    Hypertension    Glaucoma    Refusal of blood product    Pancytopenia ([facility])    Controlled type 2 diabetes mellitus with chronic kidney disease on chronic dialysis, with long-term current use of insulin ([facility])    Vitreous hemorrhage ([address]    Patient is Mandaen    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 ([address]    Hyperkalemia, diminished renal excretion    Chronic pain syndrome    Lumbar post-laminectomy syndrome    Myalgia    Cervicalgia    Diabetic peripheral neuropathy (HCC)    ESRD (end stage renal disease) ([address]    Bilateral carpal tunnel syndrome    Spinal stenosis of lumbar region with neurogenic claudication    Cardiac arrest ([address]    ESRD (end stage renal disease) on dialysis ([address]    Mixed hyperlipidemia    Lymphedema of right upper extremity    Coronary artery disease involving native coronary artery of native heart without angina pectoris        PAST MEDICAL HISTORY:    Past Medical History:   Diagnosis Date    Acute infection of bone (HCC)     infection of rt foot, resolved.     Anemia of chronic disease     Breast cancer ([address]     right breast, 2008/ bladder, 2006- last chemotherapy \"years ago\"    CAD (coronary artery disease)     Carpal tunnel syndrome     bilat - for OR left hand 3-17-20     Chronic diastolic CHF (congestive heart failure) ([address] 09/23/2014 9/23/14- echocardiogram revealed moderate LV concentric hypertrophy, stage III diastolic dysfunction, mild tricuspid regurgitation    CKD (chronic kidney disease) stage 4, GFR 15-29 ml/min ([facility])     Diabetic retinopathy ([address]     Glaucoma     Hemodialysis patient ([address]     [facility]- Dr. Marce Sanchez - left arm fistula     Hyperkalemia, diminished renal excretion 11/9/2017    Hypertension     Hypoglycemia unawareness in type 1 diabetes mellitus ([address] 11/7/2017    Insulin dependent type 2 diabetes mellitus ([address]     Neuropathy     feet    Osteomyelitis due to secondary diabetes ([address]     rt great toe with amputation    Patient is [employer] 11/7/2017    Refusal of blood product     patient states she dose not take blood transfusion    Ventricular hypertrophy     Vitreous hemorrhage (HCC)     left eye       MEDS (scheduled):   sodium chloride flush  5-40 mL Intravenous 2 times per day    metoprolol succinate  25 mg Oral BID    isosorbide mononitrate  30 mg Oral Daily    hydrALAZINE  10 mg Oral 3 times per day    heparin (porcine)  5,000 Units Subcutaneous 3 times per day    ticagrelor  90 mg Oral BID    famotidine  10 mg Oral Daily    aspirin  81 mg Oral Daily    atorvastatin  40 mg Oral Nightly       MEDS (infusions):   sodium chloride      dextrose         MEDS (prn):  midodrine, sodium chloride flush, sodium chloride, polyethylene glycol, acetaminophen, sodium chloride flush, glucose, dextrose, glucagon (rDNA), dextrose    DIET:    DIET CARDIAC; Low Sodium (2 GM);  Daily Fluid Restriction: 1500 ml; Cardiac, Renal      PHYSICAL EXAM:      Patient Vitals for the past 24 hrs:   BP Temp Temp src Pulse Resp SpO2 Weight   05/24/21 1000 (!) 113/44 -- -- 63 16 94 % --   05/24/21 0945 (!) 120/106 -- -- 63 -- -- --   05/24/21 0930 -- -- -- 62 -- -- --   05/24/21 0915 (!) 96/0 -- -- 61 -- -- --   05/24/21 0900 (!) 131/27 -- -- 60 19 97 % --   05/24/21 0845 (!) 127/36 -- -- 60 -- -- --   05/24/21 0830 (!) 137/0 -- -- 57 -- -- --   05/24/21 0815 (!) 129/39 -- -- 57 -- -- --   05/24/21 0800 (!) 127/43 97.7 °F (36.5 °C) Oral 60 19 93 % --   05/24/21 0745 (!) 129/40 -- -- 58 -- -- --   05/24/21 0730 -- -- -- 60 -- -- --   05/24/21 0715 -- 98.8 °F (37.1 °C) -- 61 -- -- --   05/24/21 0600 (!) 140/57 -- -- 60 17 93 % --   05/24/21 0500 (!) 123/44 -- -- 58 (!) 7 94 % --   05/24/21 0427 -- -- -- -- -- -- 193 lb (87.5 kg)   05/24/21 0400 (!) 105/48 98.6 °F (37 °C) Temporal 58 13 91 % --   05/24/21 0300 (!) 114/41 -- -- 59 13 96 % --   05/24/21 0200 96/63 -- -- 60 8 91 % --   05/24/21 0100 (!) 108/46 -- -- 59 8 91 % --   05/24/21 0000 (!) 104/54 99 °F (37.2 °C) Temporal 60 15 93 % --   05/23/21 2300 (!) 103/46 -- -- 60 27 (!) 88 % --   05/23/21 2200 94/66 -- -- 59 15 94 % --   05/23/21 2100 (!) 85/51 -- -- 59 12 91 % --   05/23/21 2000 (!) 100/36 99.5 °F (37.5 °C) Temporal 59 15 92 % --   05/23/21 1800 -- -- -- 58 18 -- --   05/23/21 1700 06/20/2017 26 (L) 30 - 100 ng/mL Final     Comment:     <20 ng/mL. ........... Ariana [place]ued Deficient  20-30 ng/mL. ......... Ariana Rued Insufficient   ng/mL. ........ Ariana Rued Sufficient  >100 ng/mL. .......... Ariana Rued Toxic       PTH   Date Value Ref Range Status   06/20/2017 281 (H) 15 - 65 pg/mL Final       No components found for: URIC    Lab Results   Component Value Date    COLORU Yellow 11/07/2017    NITRU Negative 11/07/2017    GLUCOSEU Negative 11/07/2017    GLUCOSEU NEGATIVE 08/27/2011    KETUA Negative 11/07/2017    UROBILINOGEN 0.2 11/07/2017    BILIRUBINUR Negative 11/07/2017    BILIRUBINUR NEGATIVE 08/27/2011       No results found for: Selestine Kehr      echo 5/19/21  The left ventricle is dilated  There is apical, septal and basal inferior wall severe hypokinesis to akinesis  Ejection fraction is visually estimated at 30+/-5%. There is doppler evidence of stage III diastolic dysfunction. Normal right ventricular size. Right ventricle global systolic function is mildly reduced . The left atrium is moderately dilated. Severe posterior mitral annular calcification. Focal calcification mitral valve leaflets. Chordal calcification is present. A mobile well defined 1.0 cm by 0.5 cm echo density is noted on the ventricular aspect of the mitral valve suggestive of a  fibroelastoma: clinical correlation is needed. No mitral stenosis. Mild mitral regurgitation. Mild to moderate tricuspid regurgitation. Moderate pulmonary hypertension. Aortic root is sclerotic and calcified. IMPRESSION/RECOMMENDATIONS:      1. esrd  Hd per her usual orders from [facility] mwf  Hd 5/21     2. Sp VT arrest  Per cardiology  Known LAD lesion from 2012  On statin asa bb nitro  Sp cath 5/21 with pci to rca and lad  On nitro, statin, bb, hydralazine, asa     3. Anemia  jehovahs witness  Continue demarcus     4.  Secondary hyperparathyroidism  Follow on renvela when taking po  p 4.5    5. HFrEF/diastolic dysfn  Nitro hydralazine held due to low bp  Per cardiology    Joshua Elba.  Gisela Arias MD

## 2021-05-24 NOTE — PROGRESS NOTES
Occupational Therapy  OCCUPATIONAL THERAPY INITIAL EVALUATION    [address] [address] [address]    Date:2021                                                 Patient Name: Ronnie Ring  MRN: [id number]  : 1955  Room: [address] [place] [address]    Evaluating OT: BESSIE Kumar/TEMI #[id number]  Referring Provider: Sean Titus MD  Specific Provider Orders: OT eval and treat; 21    Diagnosis: Cardiac arrest [facility]) [I46.9]  Reason for admission: AMS, cardiac arrest at dialysis   Surgery/Procedure:  heart cath with stents     Pertinent Medical History: anemia, breast cancer, CAD, carpal tunnel, CHF, CKD, glaucoma, HD pt, diabetic retinopathy, hyperkalemia, HTN, neuropathy, osteomyelitis,        Precautions:  Fall Risk, life vest    Assessment of current deficits   [x] Functional mobility  [x]ADLs  [x] Strength               []Cognition   [x] Functional transfers   [x] IADLs         [x] Safety Awareness   [x]Endurance   [] Fine Coordination              [x] Balance      [] Vision/perception   [x]Sensation    []Gross Motor Coordination  [x] ROM  [] Delirium                   [] Motor Control     OT PLAN OF CARE   OT POC based on physician orders, patient diagnosis and results of clinical assessment  Frequency/Duration: 1-3 days/wk for 2 weeks PRN   Specific OT Treatment to include:    Instruction/training on adapted ADL techniques and AE recommendations to increase functional independence within precautions  Training on energy conservation strategies, correct breathing pattern and techniques to improve independence/tolerance for self-care routine  Functional transfer/mobility training/DME recommendations for increased independence, safety, and fall prevention  Patient/Family education to increase follow through with safety techniques and functional independence  Recommendation of environmental modifications for increased safety with functional transfers/mobility and ADLs  Therapeutic exercise to improve motor endurance, ROM, and functional strength for ADLs/functional transfers  Therapeutic activities to facilitate/challenge dynamic balance, stand tolerance for increased safety and independence with ADLs  Therapeutic activities to facilitate gross/fine motor skills for increased independence with ADLs  Positioning to improve skin integrity, interaction with environment and functional independence  Manual techniques for edema management    Recommended Adaptive Equipment: TBD     Home Living: Pt lives alone in a 1 story condo w/ 1 NIKKO vs no NIKKO; bed/bath on 1st floor   Bathroom setup: tub/shower w/ shower chair   Equipment owned: cane, ww, shower chair    Prior Level of Function: Independent with ADLs , Independent with IADLs; ambulated w/ cane vs ww as needed  Driving: No (gets groceries delivered)    Pain Level: Pt reports 0/10 pain this session  Cognition: A&O: 4/4; Follows 1-2 step directions   Memory:  good   Sequencing:  Fair+   Problem solving:  Fair+   Judgement/safety:  Fair+     Functional Assessment:  AM-PAC Daily Activity Raw Score: 19/24   Initial Eval Status  Date: 5/24/21 Treatment Status  Date: STGs = LTGs  Time frame: 10-14 days   Feeding Independent      Grooming Minimal Assist   Modified [place]    UB Dressing Minimal Assist   Modified [place]    LB Dressing Minimal Assist   Modified [place]    Bathing Minimal Assist  Modified [place]    Toileting Minimal Assist   Modified [place]    Bed Mobility  Supine to sit: Stand by Assist   Sit to supine: Stand by Assist   Supine to sit:  Independent   Sit to supine: Independent    Functional Transfers Stand by Assist   Modified [place]    Functional Mobility Stand by Assist w/ ww (short household distance in hallway)  Modified [place]    Balance Sitting:     Static: Indep    Dynamic:SBA  Standing: SBA w/ ww  Sitting:     Static:  Indep Dynamic:Indep  Standing: Mod. I   Activity Tolerance fair  good   Visual/  Perceptual Glasses: readers  WFL                Hand Dominance R   AROM (PROM) Strength Additional Info:    RUE  Proximally: limited to mid range shoulder  Distally: WFL Grossly 4/5 good  and wfl FMC/dexterity noted during ADL tasks       LUE Grossly WFL 4/5 good  and wfl FMC/dexterity noted during ADL tasks       [facility]   Sensation:  Pt reports numbness in B hands (chronic)  Tone: WFL   Edema: RUE>LUE edema noted    Comments: Upon arrival patient lying in bed. At end of session, patient lying in bed set-up for lunch with call light and phone within reach, all lines and tubes intact. Pt instructed on use of call light for assistance and fall prevention. Line management and environmental modifications made prior to and end of session to ensure patient safety and to increase efficiency of session. Skilled monitoring of HR, O2 saturation, blood pressure and patient's response to activity performed throughout session. Overall pt demonstrated decreased independence and safety during completion of ADL/functional transfers/mobility tasks. Pt demonstrating fair+ understanding of education/techniques, requiring additional training / education. Pt would benefit from continued skilled OT to increase safety and independence with completion of ADL/IADL tasks for functional independence and quality of life. Treatment: RN approved. Therapist facilitated bed mobility(supine<>sit), functional transfers (EOB, sit<>stand w/ cueing on hand placement), standing tolerance tasks(addressing posture) and functional mobility task with ww (cuing on posture, w/w management and safety) - skilled cuing on hand placement, posture, body mechanics and safety.     Therapist facilitated self-care retraining: UB/LB self-care tasks(socks- utilizing cross over leg technique), simulated toileting task and standing grooming task(washing hands at sink) while

## 2021-05-25 VITALS
HEART RATE: 65 BPM | OXYGEN SATURATION: 94 % | HEIGHT: 70 IN | TEMPERATURE: 97.1 F | WEIGHT: 186.51 LBS | RESPIRATION RATE: 16 BRPM | SYSTOLIC BLOOD PRESSURE: 105 MMHG | BODY MASS INDEX: 26.7 KG/M2 | DIASTOLIC BLOOD PRESSURE: 54 MMHG

## 2021-05-25 LAB
ALBUMIN SERPL-MCNC: 3.1 G/DL (ref 3.5–5.2)
ALP BLD-CCNC: 85 U/L (ref 35–104)
ALT SERPL-CCNC: 38 U/L (ref 0–32)
ANION GAP SERPL CALCULATED.3IONS-SCNC: 8 MMOL/L (ref 7–16)
AST SERPL-CCNC: 17 U/L (ref 0–31)
BASOPHILS ABSOLUTE: 0.02 E9/L (ref 0–0.2)
BASOPHILS RELATIVE PERCENT: 0.3 % (ref 0–2)
BILIRUB SERPL-MCNC: 0.5 MG/DL (ref 0–1.2)
BILIRUBIN DIRECT: <0.2 MG/DL (ref 0–0.3)
BILIRUBIN, INDIRECT: ABNORMAL MG/DL (ref 0–1)
BUN BLDV-MCNC: 27 MG/DL (ref 6–23)
CALCIUM SERPL-MCNC: 8.4 MG/DL (ref 8.6–10.2)
CHLORIDE BLD-SCNC: 99 MMOL/L (ref 98–107)
CO2: 29 MMOL/L (ref 22–29)
CREAT SERPL-MCNC: 4.4 MG/DL (ref 0.5–1)
EOSINOPHILS ABSOLUTE: 0.17 E9/L (ref 0.05–0.5)
EOSINOPHILS RELATIVE PERCENT: 2.6 % (ref 0–6)
GFR AFRICAN AMERICAN: 12
GFR NON-AFRICAN AMERICAN: 12 ML/MIN/1.73
GLUCOSE BLD-MCNC: 136 MG/DL (ref 74–99)
HCT VFR BLD CALC: 29.7 % (ref 34–48)
HEMOGLOBIN: 9.2 G/DL (ref 11.5–15.5)
IMMATURE GRANULOCYTES #: 0.04 E9/L
IMMATURE GRANULOCYTES %: 0.6 % (ref 0–5)
LYMPHOCYTES ABSOLUTE: 1.01 E9/L (ref 1.5–4)
LYMPHOCYTES RELATIVE PERCENT: 15.4 % (ref 20–42)
MAGNESIUM: 2 MG/DL (ref 1.6–2.6)
MCH RBC QN AUTO: 27.4 PG (ref 26–35)
MCHC RBC AUTO-ENTMCNC: 31 % (ref 32–34.5)
MCV RBC AUTO: 88.4 FL (ref 80–99.9)
METER GLUCOSE: 183 MG/DL (ref 74–99)
METER GLUCOSE: 201 MG/DL (ref 74–99)
METER GLUCOSE: 462 MG/DL (ref 74–99)
MONOCYTES ABSOLUTE: 1.19 E9/L (ref 0.1–0.95)
MONOCYTES RELATIVE PERCENT: 18.2 % (ref 2–12)
NEUTROPHILS ABSOLUTE: 4.11 E9/L (ref 1.8–7.3)
NEUTROPHILS RELATIVE PERCENT: 62.9 % (ref 43–80)
PDW BLD-RTO: 16.9 FL (ref 11.5–15)
PHOSPHORUS: 3.1 MG/DL (ref 2.5–4.5)
PLATELET # BLD: 225 E9/L (ref 130–450)
PMV BLD AUTO: 10.9 FL (ref 7–12)
POTASSIUM SERPL-SCNC: 3.8 MMOL/L (ref 3.5–5)
RBC # BLD: 3.36 E12/L (ref 3.5–5.5)
SODIUM BLD-SCNC: 136 MMOL/L (ref 132–146)
TOTAL PROTEIN: 5.9 G/DL (ref 6.4–8.3)
WBC # BLD: 6.5 E9/L (ref 4.5–11.5)

## 2021-05-25 PROCEDURE — 82962 GLUCOSE BLOOD TEST: CPT

## 2021-05-25 PROCEDURE — 6370000000 HC RX 637 (ALT 250 FOR IP): Performed by: INTERNAL MEDICINE

## 2021-05-25 PROCEDURE — 6360000002 HC RX W HCPCS: Performed by: INTERNAL MEDICINE

## 2021-05-25 PROCEDURE — 2580000003 HC RX 258: Performed by: INTERNAL MEDICINE

## 2021-05-25 PROCEDURE — 80048 BASIC METABOLIC PNL TOTAL CA: CPT

## 2021-05-25 PROCEDURE — 84100 ASSAY OF PHOSPHORUS: CPT

## 2021-05-25 PROCEDURE — 80076 HEPATIC FUNCTION PANEL: CPT

## 2021-05-25 PROCEDURE — 36415 COLL VENOUS BLD VENIPUNCTURE: CPT

## 2021-05-25 PROCEDURE — 85025 COMPLETE CBC W/AUTO DIFF WBC: CPT

## 2021-05-25 PROCEDURE — 83735 ASSAY OF MAGNESIUM: CPT

## 2021-05-25 PROCEDURE — 99239 HOSP IP/OBS DSCHRG MGMT >30: CPT | Performed by: INTERNAL MEDICINE

## 2021-05-25 RX ORDER — ASPIRIN 81 MG/1
81 TABLET ORAL DAILY
Qty: 30 TABLET | Refills: 3 | Status: SHIPPED | OUTPATIENT
Start: 2021-05-26 | End: 2022-04-13

## 2021-05-25 RX ORDER — HYDRALAZINE HYDROCHLORIDE 10 MG/1
10 TABLET, FILM COATED ORAL EVERY 8 HOURS SCHEDULED
Qty: 270 TABLET | Refills: 1 | Status: SHIPPED | OUTPATIENT
Start: 2021-05-25 | End: 2022-02-03 | Stop reason: SDUPTHER

## 2021-05-25 RX ORDER — MIDODRINE HYDROCHLORIDE 5 MG/1
5 TABLET ORAL PRN
Qty: 90 TABLET | Refills: 0 | Status: SHIPPED | OUTPATIENT
Start: 2021-05-25 | End: 2022-04-13

## 2021-05-25 RX ORDER — INSULIN GLARGINE 100 [IU]/ML
8 INJECTION, SOLUTION SUBCUTANEOUS NIGHTLY
Status: DISCONTINUED | OUTPATIENT
Start: 2021-05-25 | End: 2021-05-25 | Stop reason: HOSPADM

## 2021-05-25 RX ORDER — METOPROLOL SUCCINATE 25 MG/1
25 TABLET, EXTENDED RELEASE ORAL DAILY
Qty: 90 TABLET | Refills: 1 | Status: SHIPPED
Start: 2021-05-25 | End: 2021-09-07 | Stop reason: SDUPTHER

## 2021-05-25 RX ADMIN — FAMOTIDINE 10 MG: 20 TABLET ORAL at 08:47

## 2021-05-25 RX ADMIN — ISOSORBIDE MONONITRATE 30 MG: 30 TABLET, EXTENDED RELEASE ORAL at 08:47

## 2021-05-25 RX ADMIN — INSULIN LISPRO 1 UNITS: 100 INJECTION, SOLUTION INTRAVENOUS; SUBCUTANEOUS at 11:39

## 2021-05-25 RX ADMIN — HEPARIN SODIUM 5000 UNITS: 10000 INJECTION INTRAVENOUS; SUBCUTANEOUS at 00:00

## 2021-05-25 RX ADMIN — Medication 10 ML: at 08:59

## 2021-05-25 RX ADMIN — METOPROLOL SUCCINATE 25 MG: 50 TABLET, EXTENDED RELEASE ORAL at 08:45

## 2021-05-25 RX ADMIN — ASPIRIN 81 MG: 81 TABLET, COATED ORAL at 08:45

## 2021-05-25 RX ADMIN — INSULIN LISPRO 6 UNITS: 100 INJECTION, SOLUTION INTRAVENOUS; SUBCUTANEOUS at 08:48

## 2021-05-25 RX ADMIN — HEPARIN SODIUM 5000 UNITS: 10000 INJECTION INTRAVENOUS; SUBCUTANEOUS at 14:45

## 2021-05-25 RX ADMIN — ACETAMINOPHEN 650 MG: 325 TABLET ORAL at 00:05

## 2021-05-25 RX ADMIN — HYDRALAZINE HYDROCHLORIDE 10 MG: 10 TABLET, FILM COATED ORAL at 14:45

## 2021-05-25 RX ADMIN — HEPARIN SODIUM 5000 UNITS: 10000 INJECTION INTRAVENOUS; SUBCUTANEOUS at 08:48

## 2021-05-25 RX ADMIN — TICAGRELOR 90 MG: 90 TABLET ORAL at 08:47

## 2021-05-25 ASSESSMENT — PAIN SCALES - GENERAL: PAINLEVEL_OUTOF10: 0

## 2021-05-25 NOTE — PROGRESS NOTES
Cervicalgia    Diabetic peripheral neuropathy (HCC)    ESRD (end stage renal disease) ([address]    Bilateral carpal tunnel syndrome    Spinal stenosis of lumbar region with neurogenic claudication    Cardiac arrest ([address]    ESRD (end stage renal disease) on dialysis ([address]    Mixed hyperlipidemia    Lymphedema of right upper extremity    Coronary artery disease involving native coronary artery of native heart with angina pectoris (HCC)    Ventricular tachycardia ([address]        PAST MEDICAL HISTORY:    Past Medical History:   Diagnosis Date    Acute infection of bone ([address]     infection of rt foot, resolved.     Anemia of chronic disease     Breast cancer ([address]     right breast, 2008/ bladder, 2006- last chemotherapy \"years ago\"    CAD (coronary artery disease)     Carpal tunnel syndrome     bilat - for OR left hand 3-17-20     Chronic diastolic CHF (congestive heart failure) ([address] 09/23/2014 9/23/14- echocardiogram revealed moderate LV concentric hypertrophy, stage III diastolic dysfunction, mild tricuspid regurgitation    CKD (chronic kidney disease) stage 4, GFR 15-29 ml/min ([facility])     Diabetic retinopathy ([address]     Glaucoma     Hemodialysis patient ([address]     [facility] Dr. Osmani Mayo - left arm fistula     Hyperkalemia, diminished renal excretion 11/9/2017    Hypertension     Hypoglycemia unawareness in type 1 diabetes mellitus ([address] 11/7/2017    Insulin dependent type 2 diabetes mellitus ([address]     Neuropathy     feet    Osteomyelitis due to secondary diabetes ([address]     rt great toe with amputation    Patient is Christianity 11/7/2017    Refusal of blood product     patient states she dose not take blood transfusion    Ventricular hypertrophy     Vitreous hemorrhage (HCC)     left eye       MEDS (scheduled):   insulin glargine  8 Units Subcutaneous Nightly    insulin lispro  0-6 Units Subcutaneous 4x Daily AC & HS    sodium chloride flush  5-40 mL Intravenous 2 times per day    metoprolol succinate  25 mg Oral BID    isosorbide mononitrate  30 mg Oral Daily    hydrALAZINE  10 mg Oral 3 times per day    heparin (porcine)  5,000 Units Subcutaneous 3 times per day    ticagrelor  90 mg Oral BID    famotidine  10 mg Oral Daily    aspirin  81 mg Oral Daily    atorvastatin  40 mg Oral Nightly       MEDS (infusions):   sodium chloride      dextrose         MEDS (prn):  midodrine, sodium chloride flush, sodium chloride, polyethylene glycol, acetaminophen, sodium chloride flush, glucose, dextrose, glucagon (rDNA), dextrose    DIET:    DIET CARDIAC; Carb Control: 4 carb choices (60 gms)/meal; Low Sodium (2 GM);  Daily Fluid Restriction: 1500 ml; Cardiac, Renal      PHYSICAL EXAM:      Patient Vitals for the past 24 hrs:   BP Temp Temp src Pulse Resp SpO2 Weight   05/25/21 0756 (!) 102/48 97.2 °F (36.2 °C) Temporal 66 16 90 % --   05/25/21 0745 110/65 97.2 °F (36.2 °C) Temporal 66 18 -- --   05/25/21 0511 (!) 104/39 -- -- -- -- -- --   05/25/21 0510 (!) 106/44 -- -- -- -- -- --   05/25/21 0500 (!) 104/39 98.8 °F (37.1 °C) Temporal 62 20 -- --   05/24/21 2345 (!) 106/44 98.4 °F (36.9 °C) -- -- -- -- --   05/24/21 2000 (!) 116/42 98.1 °F (36.7 °C) Temporal 72 18 -- --   05/24/21 1515 (!) 112/53 97.9 °F (36.6 °C) Temporal 70 16 94 % --   05/24/21 1300 (!) 99/53 -- -- 75 20 91 % --   05/24/21 1245 -- -- -- 74 16 92 % --   05/24/21 1230 (!) 87/56 -- -- 74 16 92 % --   05/24/21 1215 -- -- -- 75 17 90 % --   05/24/21 1200 81/62 98.1 °F (36.7 °C) Oral 62 18 93 % --   05/24/21 1145 (!) 114/58 98.1 °F (36.7 °C) -- 62 19 93 % 186 lb 8.2 oz (84.6 kg)   05/24/21 1130 126/64 -- -- 60 22 95 % --   05/24/21 1115 (!) 129/58 -- -- 62 24 96 % --          Intake/Output Summary (Last 24 hours) at 5/25/2021 1101  Last data filed at 5/25/2021 0947  Gross per 24 hour   Intake 300 ml   Output 3300 ml   Net -3000 ml       Wt Readings from Last 3 Encounters:   05/24/21 186 lb 8.2 oz (84.6 kg)   05/19/21 189 lb (85.7 kg)   02/25/21 189 lb (85.7 kg)       Constitutional:  Patient in no acute distress  Head: normocephalic, atraumatic  Cardiovascular: regular rate and rhythm, no murmurs, gallops, or rubs  Respiratory:  Clear, no rales, rhochi, or wheezes- life vest in place  Gastrointestinal: soft, nontender, nondistended  Ext: no edema  Neuro: aaox3  Skin: dry, no rash      DATA:      Recent Labs     05/23/21 0421 05/24/21 0424 05/25/21  0520   WBC 7.9 7.4 6.5   HGB 9.1* 9.2* 9.2*   HCT 29.3* 29.0* 29.7*   MCV 89.6 88.7 88.4    207 225     Recent Labs     05/23/21 0421 05/24/21 0424 05/25/21  0520   * 136 136   K 4.3 4.5 3.8   CL 94* 96* 99   CO2 25 24 29   BUN 37* 47* 27*   CREATININE 4.9* 6.1* 4.4*   LABGLOM 11 8 12   GLUCOSE 154* 142* 136*   CALCIUM 8.4* 8.8 8.4*     Recent Labs     05/23/21 0421 05/24/21 0424 05/25/21  0520   ALT 51* 45* 38*     Lab Results   Component Value Date    LABALBU 3.1 (L) 05/25/2021    LABALBU 3.1 (L) 05/24/2021    LABALBU 3.3 (L) 05/23/2021       Iron studies:  Lab Results   Component Value Date    FERRITIN 334 11/09/2017    IRON 33 (L) 11/09/2017    TIBC 222 (L) 11/09/2017     Vitamin B-12   Date Value Ref Range Status   02/17/2017 1802 (H) 211 - 946 pg/mL Final     Folate   Date Value Ref Range Status   02/17/2017 >20.0 4.8 - 24.2 ng/mL Final       Bone disease:  Lab Results   Component Value Date    MG 2.0 05/25/2021    PHOS 3.1 05/25/2021     Vit D, 25-Hydroxy   Date Value Ref Range Status   06/20/2017 26 (L) 30 - 100 ng/mL Final     Comment:     <20 ng/mL. ........... [place] Deficient  20-30 ng/mL. ......... [place] Insufficient   ng/mL. ........ [place] Sufficient  >100 ng/mL. .......... [place] Toxic       PTH   Date Value Ref Range Status   06/20/2017 281 (H) 15 - 65 pg/mL Final       No components found for: URIC    Lab Results   Component Value Date    COLORU Yellow 11/07/2017    NITRU Negative 11/07/2017    GLUCOSEU Negative 11/07/2017    GLUCOSEU NEGATIVE 08/27/2011    KETUA Negative 11/07/2017    UROBILINOGEN 0.2 11/07/2017    BILIRUBINUR Negative 11/07/2017    BILIRUBINUR NEGATIVE 08/27/2011       No results found for: Judstephanie Mooring      echo 5/19/21  The left ventricle is dilated  There is apical, septal and basal inferior wall severe hypokinesis to akinesis  Ejection fraction is visually estimated at 30+/-5%. There is doppler evidence of stage III diastolic dysfunction. Normal right ventricular size. Right ventricle global systolic function is mildly reduced . The left atrium is moderately dilated. Severe posterior mitral annular calcification. Focal calcification mitral valve leaflets. Chordal calcification is present. A mobile well defined 1.0 cm by 0.5 cm echo density is noted on the ventricular aspect of the mitral valve suggestive of a  fibroelastoma: clinical correlation is needed. No mitral stenosis. Mild mitral regurgitation. Mild to moderate tricuspid regurgitation. Moderate pulmonary hypertension. Aortic root is sclerotic and calcified. IMPRESSION/RECOMMENDATIONS:      1. esrd  Hd per her usual orders from [facility]  HD planned for 5/26     2. Sp VT arrest  Per cardiology  Known LAD lesion from 2012  On statin asa bb nitro  Sp cath 5/21 with pci to rca and lad  On nitro, statin, bb, hydralazine, asa     3. Anemia  jehovahs witness  Continue demarcus     4.  Secondary hyperparathyroidism  Follow on renvela when taking po  p 3.1    5. HFrEF/diastolic dysfn  Nitro hydralazine held due to low bp  Per cardiology  Life john Pineda, GOYO - CNP     Pt seen and examined agree with above  Hd mwf  dw sister  Harpal Whaley MD

## 2021-05-25 NOTE — DISCHARGE SUMMARY
[address]  Department of Internal Medicine   Internal Medicine Residency Program   Discharge Summary    PCP: Pino Vides MD    House Team: 2    Admit Date:5/19/2021  Discharge Date:5/25/2021    Admission Diagnosis:  1. Vtach/Vfib arrest, r/o ischemic cause in setting of multiple cardiac risk factors  2. Acute encephalopathy in the setting of #1 - resolved   3. 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 HFrEF 2/2 ICM   5. Hx T2DM  6. Hx ESRD on HD    Discharge Diagnosis:  1. V. fib/V. tach arrest 2/2 severe CAD, resolved  2. HFrEF 2/2 ischemic cardiomyopathy   3. 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 Hx T2DM  5. Hx ESRD on HD    Hospital Course:  Terrence Reid is a 72 y/o female with a PMHx of HTN, HLD, T2DM w/ retinopathy/neuropathy, R breast cancer s/p mastectomy with residual lymphedema, CAD s/p cath in 2011/2012 showing 90% stenosis in LCX, and ESRD on HD, who was admitted on 5/19/2021 for VT/Vfib arrest while in HD. Per note review, she was receiving HD when she was noted to become unresponsive, no pulse. She was shocked once in the field w/ ROSC by EMS, and transferred immediately to [facility]. While in the ED, she was awake and answering questions, but was noted to be altered and with retrograde amnesia and new RUE weakness. A stroke alert was called and the pt underwent CT head, CTA head/neck, and CTA brain perfusion - all of which were negative for acute stroke.      Shortly after returning from radiology, the pt was noted to fall back into Vtach while being evaluated by Cardiology. She was placed on Lidocaine gtt and transferred to MICU for further management and stabilization. In MICU, she was started on ASA, BB, and heparin gtt. She underwent stress test, which showed a large fixed defect in the apical and septal walls, as well as small fixed defect in the inferoapical wall and a reversible defect in the anterolateral wall, EF approximately 25%.  She underwent [facility] the following morning, during which time it was found that she had 95% stenosis of the LAD, 100% stenosis of the cirfumflex, and 90% stenosis of the RCA. She underwent PCI with MARYLOU to LAD and RCA.      Since the time of her cardiac catheterization, Ms. Kinney has continued to improve clinically. She is currently maintained on DAPT (ASA and Brilinta), statin, Metoprolol, Hydralazine, and Imdur. Cardiac rehab has seen the pt and will follow as outpatient. Of note, Echo performed 5/21/2021 showed EF 30% +/- 5% with stage III DD, incidentally noting as well as 1.0cm x 0.5cm mass on the ventricular aspect of the mitral valve suggestive of fibroelastoma. No recommendations were made by Cardiology regarding further workup while inpatient. Initially, the plan was to discharge the patient from MICU on 5/23/2021. However, the pt's sister was concerned about her functional status as she had required two-nurse assist to stand and walk to the restroom. PT/OT evaluation showed AMPAC 18-19 not warranting placement. She will be discharged with [facility]. Cardiac medications were not changed aside from metoprolol which was changed to 25 mg daily from twice daily due to borderline low blood pressure. Her blood sugar was elevated to 462 on the day of discharge. She did not receive any insulin during her admission as her blood sugars were controlled. She received 8 units of Humalog which improved her blood sugar to 182. She is discharged today in stable medical condition.     Significant findings (history and exam, laboratory, radiological, pathology, other tests):  CBC with Differential:    Lab Results   Component Value Date    WBC 6.5 05/25/2021    RBC 3.36 05/25/2021    HGB 9.2 05/25/2021    HCT 29.7 05/25/2021     05/25/2021    MCV 88.4 05/25/2021    MCH 27.4 05/25/2021    MCHC 31.0 05/25/2021    RDW 16.9 05/25/2021    NRBC 0.0 02/18/2017    SEGSPCT 70 03/12/2014    BANDSPCT 1 02/18/2015    LYMPHOPCT 15.4 05/25/2021 PROMYELOPCT 1.8 02/18/2017    MONOPCT 18.2 05/25/2021    MYELOPCT 0.9 10/24/2017    BASOPCT 0.3 05/25/2021    MONOSABS 1.19 05/25/2021    LYMPHSABS 1.01 05/25/2021    EOSABS 0.17 05/25/2021    BASOSABS 0.02 05/25/2021     CMP:    Lab Results   Component Value Date     05/25/2021    K 3.8 05/25/2021    K 3.9 05/19/2021    CL 99 05/25/2021    CO2 29 05/25/2021    BUN 27 05/25/2021    CREATININE 4.4 05/25/2021    GFRAA 12 05/25/2021    LABGLOM 12 05/25/2021    GLUCOSE 136 05/25/2021    GLUCOSE 46 04/02/2012    PROT 5.9 05/25/2021    LABALBU 3.1 05/25/2021    LABALBU 3.8 04/02/2012    CALCIUM 8.4 05/25/2021    BILITOT 0.5 05/25/2021    ALKPHOS 85 05/25/2021    AST 17 05/25/2021    ALT 38 05/25/2021        Echo Complete    Result Date: 5/21/2021  Transthoracic Echocardiography Report (TTE)  Demographics   Patient Name    Monique Dick Gender            Female                  G   Medical Record  [id number]      Room Number       5704  Number   Account #       [de-identified]     Procedure Date    05/21/2021   Corporate ID                  Ordering          Serenity Clements MD                                Physician   Accession       [id number]    Referring  Number                        Physician   Date of Birth   1955    Sonographer       Young Breath RDCS   Age             73 year(s)    Interpreting      [address]                                Physician         Physician Cardiology                                                  Serenity Clements MD                                 Any Other  Procedure Type of Study   TTE procedure:Echo Complete W/ Dop & Color Flow. Procedure Date Date: 05/21/2021 Start: 12:07 PM Study Location: Portable Technical Quality: Adequate visualization Indications:Cardiac arrest and Chest pain. Patient Status: Routine Height: 70 inches Weight: 198 pounds BSA: 2.08 m^2 BMI: 28.41 kg/m^2 HR: 61 bpm BP: 116/50 mmHg Allergies   - Other drug:(Lasix).   Findings   Left Ventricle  The left ventricle is dilated  There is apical, septal and basal inferior wall severe hypokinesis to  akinesis  Ejection fraction is visually estimated at 30+/-5%. There is doppler evidence of stage III diastolic dysfunction. Right Ventricle  Normal right ventricular size. Right ventricle global systolic function is mildly reduced . Left Atrium  The left atrium is moderately dilated. Interatrial septum appears intact. Right Atrium  Normal right atrium size. Mitral Valve  Severe posterior mitral annular calcification. Focal calcification mitral valve leaflets. Chordal calcification is present. A mobile well defined 1.0 cm by 0.5 cm echo density is noted on the  ventricular aspect of the mitral valve suggestive of a fibroelastoma:  clinical correlation is needed. No mitral stenosis. Mild mitral regurgitation. Tricuspid Valve  The tricuspid valve appears structurally normal.  Mild to moderate tricuspid regurgitation. Moderate pulmonary hypertension. Estimated RVSP is 51 mmHg. Aortic Valve  The aortic valve appears minimally sclerotic without stenosis or  regurgitation. Pulmonic Valve  Normal pulmonic valve structure and function. Physiologic and/or trace pulmonic regurgitation present. Pericardial Effusion  No evidence of pericardial effusion. Aorta  Aortic root dimension within normal limits. Aortic root is sclerotic and calcified. Conclusions   Summary  The left ventricle is dilated  There is apical, septal and basal inferior wall severe hypokinesis to  akinesis  Ejection fraction is visually estimated at 30+/-5%. There is doppler evidence of stage III diastolic dysfunction. Normal right ventricular size. Right ventricle global systolic function is mildly reduced . The left atrium is moderately dilated. Severe posterior mitral annular calcification. Focal calcification mitral valve leaflets. Chordal calcification is present.   A mobile well defined 1.0 cm by 0.5 cm echo density is noted on the  ventricular aspect of the mitral valve suggestive of a fibroelastoma:  clinical correlation is needed. No mitral stenosis. Mild mitral regurgitation. Mild to moderate tricuspid regurgitation. Moderate pulmonary hypertension. Aortic root is sclerotic and calcified.    Signature   ----------------------------------------------------------------  Electronically signed by Emi Crowley MD(Interpreting  physician) on 05/21/2021 05:14 PM  ----------------------------------------------------------------  M-Mode/2D Measurements & Calculations   LV Diastolic    LV Systolic Dimension: 4.8   AV Cusp Separation: 1.4 cmLA  Dimension: 5.8  cm                           Dimension: 4.4 cmAO Root  cm              LV Volume Diastolic: 665.5   Dimension: 3 cm  LV FS:17.2 %    ml  LV PW           LV Volume Systolic: 239.7 ml  Diastolic: 1.1  LV EDV/LV EDV Index: 167.4  cm              ml/80 ml/m^2LV ESV/LV ESV    RV Diastolic Dimension: 3.3  Septum          Index: 105.1 ml/51ml/ m^2    cm  Diastolic: 1.2  EF Calculated: 37.2 %  cm              LV Mass Index: 135 l/min*m^2 Ascending Aorta: 2.4 cm  CO: 2.66 l/min  LV Length: 8.5 cm            LA volume/Index: 94.1 ml  CI: 1.28                                     /45.23ml/m^2  l/m*m^2         LVOT: 2 cm                   RA Area: 22.5 cm^2  LV Mass: 280.43  g  Doppler Measurements & Calculations   MV Peak E-Wave:     AV Peak Velocity:     LVOT Peak Velocity: 0.69 m/s  1.07 m/s            1.37 m/s              LVOT Mean Velocity: 0.42 m/s  MV Peak A-Wave:     AV Peak Gradient:     LVOT Peak Gradient: 1.9 mmHgLVOT  0.57 m/s            7.55 mmHg             Mean Gradient: 0.8 mmHg  MV E/A Ratio: 1.89  AV Mean Velocity:     Estimated RVSP: 45.5 mmHg  MV Peak Gradient:   0.92 m/s              Estimated RAP:3 mmHg  7.2 mmHg            AV Mean Gradient: 3.9  MV Mean Gradient:   mmHg  2.3 mmHg            AV VTI: 31.8 cm       TR Velocity:3.26 m/s  MV Mean Velocity:   AV Area TR Gradient:42.46 mmHg  0.69 m/s            (Continuity):1.37     PV Peak Velocity: 1.05 m/s  MV Deceleration     cm^2                  PV Peak Gradient: 4.43 mmHg  Time: 98.4 msec                           PV Mean Velocity: 0.64 m/s  MV P1/2t: 36 msec   LVOT VTI: 13.9 cm     PV Mean Gradient: 1.9 mmHg  MVA by PHT:6.11  cm^2                Estimated PASP: 45.46  MV Area             mmHg  (continuity): 1.1  cm^2  MV E' Septal  Velocity: 0.04 m/s  MV E' Lateral  Velocity: 6 m/s  [email or web address] [id number]    CT HEAD WO CONTRAST    Result Date: 5/19/2021  EXAMINATION: CT OF THE HEAD WITHOUT CONTRAST  5/19/2021 12:48 pm TECHNIQUE: CT of the head was performed without the administration of intravenous contrast. Dose modulation, iterative reconstruction, and/or weight based adjustment of the mA/kV was utilized to reduce the radiation dose to as low as reasonably achievable. COMPARISON: None. HISTORY: ORDERING SYSTEM PROVIDED HISTORY: right arm weakness TECHNOLOGIST PROVIDED HISTORY: Has a \"code stroke\" or \"stroke alert\" been called? ->Yes Reason for exam:->right arm weakness Decision Support Exception - unselect if not a suspected or confirmed emergency medical condition->Emergency Medical Condition (MA) What reading provider will be dictating this exam?->[facility] FINDINGS: BRAIN/VENTRICLES: There is no acute intracranial hemorrhage, mass effect or midline shift. No abnormal extra-axial fluid collection. The gray-white differentiation is maintained without evidence of an acute infarct. There is no evidence of hydrocephalus. ORBITS: Prosthetic lenses are seen in the globes bilaterally. Devices along the lateral margins of the globes may be for the purposes of glaucoma treatment. SINUSES: The visualized paranasal sinuses and mastoid air cells demonstrate no acute abnormality.  SOFT TISSUES/SKULL:  No acute abnormality of the visualized skull or soft tissues. No acute intracranial abnormality. XR CHEST PORTABLE    Result Date: 2021  EXAMINATION: ONE XRAY VIEW OF THE CHEST 2021 7:34 pm COMPARISON: 2021 HISTORY: ORDERING SYSTEM PROVIDED HISTORY: check position of left CVC TECHNOLOGIST PROVIDED HISTORY: Reason for exam:->check position of left CVC What reading provider will be dictating this exam?->CRC FINDINGS: Pulmonary vascular congestive changes. There is no effusion or pneumothorax. Stable cardiomegaly. The osseous structures are without acute process. Right sided port a catheter tip in the SVC. Left-sided internal jugular line tip terminates at the level of the aortic arch and should be advanced. Cardiomegaly with pulmonary vascular congestive changes. Left-sided internal jugular line tip terminates in the region of the left aortic arch and should be advanced. Stable positioning of right sided port a catheter tip in the SVC. XR CHEST PORTABLE    Result Date: 2021  EXAMINATION: ONE XRAY VIEW OF THE CHEST 2021 1:46 pm COMPARISON: None. HISTORY: ORDERING SYSTEM PROVIDED HISTORY: Shortness of breath TECHNOLOGIST PROVIDED HISTORY: Reason for exam:->Shortness of breath What reading provider will be dictating this exam?->CRC FINDINGS: The lungs are without acute focal process. There is no effusion or pneumothorax. Cardiomegaly. The osseous structures are without acute process. Right-sided port a catheter tip in the SVC. No acute process. Cardiomegaly.      CTA NECK W CONTRAST    Result Date: 2021  Patient MRN:  [id number] : 1955 Age: 73 years Gender: Female Order Date:  2021 12:48 PM EXAM: CTA NECK W CONTRAST, CTA HEAD W CONTRAST NUMBER OF IMAGES:  [id number] INDICATION:  right arm weakness right arm weakness Decision Support Exception - unselect if not a suspected or confirmed emergency medical condition->Emergency Medical Condition (MA) What reading provider will be dictating this exam?->[facility] COMPARISON: None Technique: Low-dose CT  acquisition technique included one of following options; 1 . Automated exposure control, 2. Adjustment of MA and or KV according to patient's size or 3. Use of iterative reconstruction. Contiguous spiral images were obtained in the axial plane, following the administration of intravenous contrast using CT angiographic protocol. Sagittal and coronal images were reconstructed from the axial plane acquisition. Additional MIP reconstructions were presented to aid in the interpretation of this study. Images were obtained from the skull base cranially. There is mild calcified plaque identified in the vessels compatible with atherosclerotic disease. There is aortic arch and great vessels demonstrate mild atherosclerotic disease. The right carotid is unremarkable. The left carotid is unremarkable. The right vertebral artery is unremarkable. The left vertebral artery is unremarkable. The basilar artery is unremarkable. The middle cerebral arteries are unremarkable. The anterior cerebral arteries are unremarkable. The posterior cerebral arteries are unremarkable. 1.  Mild atherosclerotic disease . No large vessel occlusion identified 2. Estimated stenosis of the proximal right and left internal carotid artery by NASCET criteria is not hemodynamically significant This study was analyzed by the Viz. ai algorithm. CT BRAIN PERFUSION    Result Date: 2021  Patient MRN: [id number] : 1955 Age:  73 years Gender: Female Order Date: 2021 12:48 PM Exam: CT BRAIN PERFUSION Number of Images: 467 views Indication:   stroke alert stroke alert Decision Support Exception - unselect if not a suspected or confirmed emergency medical condition->Emergency Medical Condition (MA) What reading provider will be dictating this exam?->[facility] Comparison: None.  Findings: Perfusion images demonstrate symmetric blood volume Blood flow images demonstrate symmetric blood flow There is no significant ischemic penumbra identified. There is no significant core infarct identified. No significant ischemic penumbra identified This study was analyzed by the Viz. ai algorithm. CTA HEAD W CONTRAST    Result Date: 2021  Patient MRN:  [id number] : 1955 Age: 73 years Gender: Female Order Date:  2021 12:48 PM EXAM: CTA NECK W CONTRAST, CTA HEAD W CONTRAST NUMBER OF IMAGES:  [id number] INDICATION:  right arm weakness right arm weakness Decision Support Exception - unselect if not a suspected or confirmed emergency medical condition->Emergency Medical Condition (MA) What reading provider will be dictating this exam?->[facility] COMPARISON: None Technique: Low-dose CT  acquisition technique included one of following options; 1 . Automated exposure control, 2. Adjustment of MA and or KV according to patient's size or 3. Use of iterative reconstruction. Contiguous spiral images were obtained in the axial plane, following the administration of intravenous contrast using CT angiographic protocol. Sagittal and coronal images were reconstructed from the axial plane acquisition. Additional MIP reconstructions were presented to aid in the interpretation of this study. Images were obtained from the skull base cranially. There is mild calcified plaque identified in the vessels compatible with atherosclerotic disease. There is aortic arch and great vessels demonstrate mild atherosclerotic disease. The right carotid is unremarkable. The left carotid is unremarkable. The right vertebral artery is unremarkable. The left vertebral artery is unremarkable. The basilar artery is unremarkable. The middle cerebral arteries are unremarkable. The anterior cerebral arteries are unremarkable. The posterior cerebral arteries are unremarkable. 1.  Mild atherosclerotic disease . No large vessel occlusion identified 2.  Estimated stenosis of the proximal right and left internal carotid artery by NASCET criteria is not hemodynamically significant This study was analyzed by the Viz. ai algorithm. NM Cardiac Stress Test Nuclear Imaging    Result Date: 5/20/2021  Indication:  Cardiac arrest, V. Tach Clinical History:   Patient has historyof coronary artery disease. IMAGING: Myocardial perfusion imaging was performed at rest 30-35 minutes following the intravenous injection of 12 mCi of (Tc-Sestamibi) followed by 10 ml of Normal Saline. At peak exercise, the patient was injected intravenously with 30mCi of (Tc-Sestamibi) followed by 10 ml of Normal Saline. Gated post-stress tomographic imaging was performed 20-25 minutes after stress. FINDINGS: The overall quality of the study was adequate. Left ventricular cavity size was noted to be dilated during stress and rest images Rotational analog analysis demonstrated show no significant motion artifact The gated SPECT stress imaging in the short, vertical long, and horizontal long axis demonstrated severe defect was present in the apical wall(s) that was large sized by quantification. There also was a severe defect present in the septal wall(s) that was large sized by quantification. There is also mild defect present in the small inferior apical that are small in size There is also a mild defect present in the anterior septal wall that was small in size The resting images partially reduced. The anterolateral wall only. Gated SPECT left ventricular ejection fraction was calculated to be 25%, with apical and septal akinesis, moderate global hypokinesis. The myocardial perfusion imaging was abnormal The abnormality was a large fixed defect in the apical and septal walls; small fixed defect in the inferoapical wall; partially reversible defect in the small area of anterolateral wall. Overall left ventricular systolic function was severely reduced, EF 25% with apical septal akinesis, moderate global hypokinesis.  Dilated left ventricle during stress and [place] - [place]  [facility] [place] [facility] [address]    Phone: [phone]   · aspirin 81 MG EC tablet  · hydrALAZINE 10 MG tablet  · metoprolol succinate 25 MG extended release tablet  · midodrine 5 MG tablet  · ticagrelor 90 MG Tabs tablet         Activity: activity as tolerated  Diet: cardiac diet    Follow-up appointments:  · Follow-up with PCP in the next week post discharge  · Follow-up with Cardiac rehab  · Follow-up with Cardiology in 3-4 weeks    Patient Instructions:   · Be compliant with medications  · Take aspirin daily and Brilinta twice daily  · Take hydralazine 10 mg every 8 hours, metoprolol 25 mg daily, Imdur 30 mg daily  · Ensure compliance to LifeVest  · Continue the remainder of your medications as prescribed      Alfreda Young MD , PGY - 1   1:37 PM  5/25/2021    Attending physician: Dr. Adelina Velazquez

## 2021-05-25 NOTE — CARE COORDINATION
Messaged IM resident d/t morning nursing rounds pt was to discharge yesterday after being seen by therapy. Per Dr. Karri Lambert they are anticipating discharge today. I also relayed need for [facility] order as well. Discharge plan is home with [facility], sister will provide transportation home once medically stable. I verified with pt at bedside whom reports that she has not received lantus since last Wednesday, blood glucose this morning 462.

## 2021-05-26 ENCOUNTER — TELEPHONE (OUTPATIENT)
Dept: ADMINISTRATIVE | Age: 66
End: 2021-05-26

## 2021-05-26 ENCOUNTER — TELEPHONE (OUTPATIENT)
Dept: FAMILY MEDICINE CLINIC | Age: 66
End: 2021-05-26

## 2021-05-26 NOTE — TELEPHONE ENCOUNTER
Pt's sister Abilio Baca calling for HFU apt/timing with Dr. Annette Alvares dx: 5/25/21 dx: S/p cath/stents. [phone].

## 2021-05-26 NOTE — TELEPHONE ENCOUNTER
[facility] Initial Follow Up Call    Outreach made within 2 business days of discharge: Yes    Patient: Saray Renae Patient : 1955   MRN: [id number]  Reason for Admission: Cardiac arrest   Discharge Date: 21       Spoke with: Francois Carver. Discharge department/facility: home     TCM Interactive Patient Contact:  Was patient able to fill all prescriptions: Yes  Was patient instructed to bring all medications to the follow-up visit: Yes  Is patient taking all medications as directed in the discharge summary? Yes  Does patient understand their discharge instructions: Yes  Does patient have questions or concerns that need addressed prior to 7-14 day follow up office visit: yes - Concerned they have not heard from [address] [place] [address] Yobani Rai yet. I assured her the referral was in and they will be contacted. Claudia's sister is trying to get appointments lined up and has a lot to juggle.        Scheduled appointment with PCP within 7-14 days    Follow Up  Future Appointments   Date Time Provider Dev Arizmendi   6/3/2021  9:00 AM Basil Marrero, DO [facility]

## 2021-06-02 NOTE — TELEPHONE ENCOUNTER
----- Message from Marialuisa Romero MD sent at 5/30/2021 11:51 AM EDT -----  Follow-up in 1 to 2 weeks

## 2021-06-03 ENCOUNTER — OFFICE VISIT (OUTPATIENT)
Dept: FAMILY MEDICINE CLINIC | Age: 66
End: 2021-06-03
Payer: COMMERCIAL

## 2021-06-03 VITALS
SYSTOLIC BLOOD PRESSURE: 95 MMHG | DIASTOLIC BLOOD PRESSURE: 40 MMHG | BODY MASS INDEX: 27.69 KG/M2 | WEIGHT: 193.4 LBS | TEMPERATURE: 97 F | HEIGHT: 70 IN

## 2021-06-03 DIAGNOSIS — F51.4 NIGHT TERRORS, ADULT: ICD-10-CM

## 2021-06-03 DIAGNOSIS — I46.9 CARDIAC ARREST (HCC): Primary | ICD-10-CM

## 2021-06-03 DIAGNOSIS — E11.42 DIABETIC PERIPHERAL NEUROPATHY (HCC): ICD-10-CM

## 2021-06-03 DIAGNOSIS — M19.011 ARTHRITIS OF RIGHT SHOULDER REGION: ICD-10-CM

## 2021-06-03 DIAGNOSIS — I21.4 NSTEMI (NON-ST ELEVATED MYOCARDIAL INFARCTION) (HCC): ICD-10-CM

## 2021-06-03 DIAGNOSIS — M48.062 SPINAL STENOSIS OF LUMBAR REGION WITH NEUROGENIC CLAUDICATION: ICD-10-CM

## 2021-06-03 DIAGNOSIS — N18.6 ESRD (END STAGE RENAL DISEASE) (HCC): ICD-10-CM

## 2021-06-03 PROCEDURE — 99213 OFFICE O/P EST LOW 20 MIN: CPT | Performed by: STUDENT IN AN ORGANIZED HEALTH CARE EDUCATION/TRAINING PROGRAM

## 2021-06-03 PROCEDURE — 1111F DSCHRG MED/CURRENT MED MERGE: CPT | Performed by: STUDENT IN AN ORGANIZED HEALTH CARE EDUCATION/TRAINING PROGRAM

## 2021-06-03 RX ORDER — LIDOCAINE 4 G/G
1 PATCH TOPICAL DAILY
Qty: 30 PATCH | Refills: 0 | Status: SHIPPED | OUTPATIENT
Start: 2021-06-03 | End: 2021-07-01 | Stop reason: SDUPTHER

## 2021-06-03 NOTE — PROGRESS NOTES
Subjective:    Lillian Escalante is here in follow up after having a cardiac arrest in dialysis. She had to be shocked and was ultimately had two stents inserted. She is wearing a vest and is on typical cardiac meds. She has chronic right shoulder pain. She is not surgical candidate for the shoulder.      ROS: Otherwise negative    Patient Active Problem List   Diagnosis    Diabetic retinopathy ([address]    Malignant neoplasm of right female breast ([address]    Atherosclerosis of native coronary artery of native heart without angina pectoris    Moderate obesity    Left ventricular hypertrophy    Herniated lumbar intervertebral disc    Lumbar degenerative disc disease    Pseudomeningocele    Lumbar radiculopathy    Lymphedema of arm    CKD (chronic kidney disease) stage 4, GFR 15-29 ml/min ([facility])    Insulin dependent type 2 diabetes mellitus ([facility])    Anemia of chronic disease    Chronic diastolic CHF (congestive heart failure) ([facility])    Neuropathy    Hypertension    Glaucoma    Refusal of blood product    Pancytopenia ([facility])    Controlled type 2 diabetes mellitus with chronic kidney disease on chronic dialysis, with long-term current use of insulin ([facility])    Vitreous hemorrhage ([address]    Patient is Baptism    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 ([address]    Hyperkalemia, diminished renal excretion    Chronic pain syndrome    Lumbar post-laminectomy syndrome    Myalgia    Cervicalgia    Diabetic peripheral neuropathy (HCC)    ESRD (end stage renal disease) ([address]    Bilateral carpal tunnel syndrome    Spinal stenosis of lumbar region with neurogenic claudication    Cardiac arrest ([address]    ESRD (end stage renal disease) on dialysis ([address]    Mixed hyperlipidemia    Lymphedema of right upper extremity    Coronary artery disease involving native coronary artery of native heart with angina pectoris ([address]    Ventricular tachycardia (HCC)       Past medical, surgical, family and social history were reviewed, non-contributory, and unchanged unless otherwise stated. Objective:    BP (!) 95/40   Temp 97 °F (36.1 °C) (Temporal)   Ht 5' 10\" (1.778 m)   Wt 193 lb 6.4 oz (87.7 kg)   BMI 27.75 kg/m²     Exam is as noted by resident with the following changes, additions or corrections:      Assessment/Plan:        Claudia was seen today for follow-up from hospital.    Diagnoses and all orders for this visit:    Cardiac arrest ([facility]  -     [place] DISCHARGE MEDS RECONCILED W/ CURRENT OUTPATIENT MED LIST    NSTEMI (non-ST elevated myocardial infarction) ([facility]    Night terrors, adult    Arthritis of right shoulder region  -     lidocaine 4 % external patch; Place 1 patch onto the skin daily  -     DME Order for (Specify) as OP    Spinal stenosis of lumbar region with neurogenic claudication  -     DME Order for (Specify) as OP    Diabetic peripheral neuropathy ([facility]  -     DME Order for (Specify) as OP    ESRD (end stage renal disease) ([facility]  -     DME Order for (Specify) as OP           Attending Physician Statement    I have reviewed the chart, including any radiology or labs. I have discussed the case, including pertinent history and exam findings with the resident. I agree with the assessment, plan and orders as documented by the resident. Please refer to the resident note for additional information.       Electronically signed by Lita Brannon DO on 6/7/2021 at 8:41 AM

## 2021-06-03 NOTE — PROGRESS NOTES
Post-Discharge Transitional Care Management Services or Hospital Follow Up      Ivory Tidwell   YOB: 1955    Date of Office Visit:  6/3/2021  Date of Hospital Admission: 5/19/21  Date of Hospital Discharge: 5/25/21  Risk of hospital readmission (high >=14%.  Medium >=10%) :Readmission Risk Score: 29      Care management risk score Rising risk (score 2-5) and Complex Care (Scores >=6): 7     Non face to face  following discharge, date last encounter closed (first attempt may have been earlier): 5/26/2021  1:25 PM    Call initiated 2 business days of discharge: Yes    Patient Active Problem List   Diagnosis    Diabetic retinopathy ([address]    Malignant neoplasm of right female breast ([address]    Atherosclerosis of native coronary artery of native heart without angina pectoris    Moderate obesity    Left ventricular hypertrophy    Herniated lumbar intervertebral disc    Lumbar degenerative disc disease    Pseudomeningocele    Lumbar radiculopathy    Lymphedema of arm    CKD (chronic kidney disease) stage 4, GFR 15-29 ml/min (HCC)    Insulin dependent type 2 diabetes mellitus (HCC)    Anemia of chronic disease    Chronic diastolic CHF (congestive heart failure) (HCC)    Neuropathy    Hypertension    Glaucoma    Refusal of blood product    Pancytopenia (HCC)    Controlled type 2 diabetes mellitus with chronic kidney disease on chronic dialysis, with long-term current use of insulin (HCC)    Vitreous hemorrhage ([address]    Patient is Restorationist    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 ([address]    Hyperkalemia, diminished renal excretion    Chronic pain syndrome    Lumbar post-laminectomy syndrome    Myalgia    Cervicalgia    Diabetic peripheral neuropathy (HCC)    ESRD (end stage renal disease) ([address]    Bilateral carpal tunnel syndrome    Spinal stenosis of lumbar region with neurogenic claudication    Cardiac arrest ([address]    ESRD (end stage renal drop into the right eye every 12 hours   7        Medications patient taking as of now reconciled against medications ordered at time of hospital discharge: Yes    Chief Complaint   Patient presents with   [address] from Hospital     pt was in for a heart attack        History of Present illness - Follow up of Hospital diagnosis(es): cardiac arrest, MI requiring stents  Arrest during dialysis needed compressions, go stents for MI, now has vest on and appropriate follow up with cardiology. Per her sister, patient has been having night terrors and hallucinations at night     Inpatient course: Discharge summary reviewed- see chart. Interval history/Current status: Currently, feeling well     A comprehensive review of systems was negative except for what was noted in the HPI. Vitals:    06/03/21 0916   BP: (!) 95/40   Temp: 97 °F (36.1 °C)   TempSrc: Temporal   Weight: 193 lb 6.4 oz (87.7 kg)   Height: 5' 10\" (1.778 m)     Body mass index is 27.75 kg/m².    Wt Readings from Last 3 Encounters:   06/03/21 193 lb 6.4 oz (87.7 kg)   05/24/21 186 lb 8.2 oz (84.6 kg)   05/19/21 189 lb (85.7 kg)     BP Readings from Last 3 Encounters:   06/03/21 (!) 95/40   05/25/21 (!) 105/54   05/19/21 (!) 150/123        Physical Exam:  General Appearance: alert and oriented to person, place and time, well developed and well- nourished, in no acute distress  Skin: warm and dry, no rash or erythema  Eyes: pupils equal, round, and reactive to light, extraocular eye movements intact, conjunctivae normal  Pulmonary/Chest: clear to auscultation bilaterally- no wheezes, rales or rhonchi, normal air movement, no respiratory distress  Cardiovascular: normal rate, regular rhythm, normal S1 and S2, no murmurs, rubs, clicks, or gallops, distal pulses intact, no carotid bruits  Abdomen: soft, non-tender, non-distended, normal bowel sounds, no masses or organomegaly  Extremities: no cyanosis, clubbing or edema  Musculoskeletal: normal range of motion, no joint swelling, deformity or tenderness  Neurologic: decreased ROM of right shoulder with crepitus and pain with ROM testing     Assessment/Plan:  1. Cardiac arrest [facility]  Hospital f/u  -Doing well currently and taking meds appropriately and has appropriate follow up with cardiology  - [place] DISCHARGE MEDS RECONCILED W/ CURRENT OUTPATIENT MED LIST    2. NSTEMI (non-ST elevated myocardial infarction) ([facility]  Discussion as above    3. Night terrors, adult  Could be side effect of metoprolol vs acute stress disorder, patient advised to talk to cardiologist about changing metoprolol and will see if this improves in one month    4. Arthritis of right shoulder region  F/u of chronic issue  -Worsening, will try lidocaine patch as patient just had MI and cannot tolerate voltaren gel, asked patient to ask cardiology if they would be ok with her starting voltaren gel back up again, if ok will prescribe  - lidocaine 4 % external patch; Place 1 patch onto the skin daily  Dispense: 30 patch; Refill: 0  - DME Order for (Specify) as OP    Orders:   5. Spinal stenosis of lumbar region with neurogenic claudication  - DME Order for [facility]) as OP    6. Diabetic peripheral neuropathy ([facility]  - DME Order for (Specify) as OP    7.  ESRD (end stage renal disease) ([facility]  - DME Order for (Specify) as OP        Medical Decision Making: moderate complexity

## 2021-06-08 ENCOUNTER — OFFICE VISIT (OUTPATIENT)
Dept: CARDIOLOGY CLINIC | Age: 66
End: 2021-06-08
Payer: COMMERCIAL

## 2021-06-08 VITALS
HEIGHT: 70 IN | DIASTOLIC BLOOD PRESSURE: 60 MMHG | OXYGEN SATURATION: 93 % | WEIGHT: 193.8 LBS | RESPIRATION RATE: 16 BRPM | HEART RATE: 66 BPM | BODY MASS INDEX: 27.75 KG/M2 | SYSTOLIC BLOOD PRESSURE: 90 MMHG

## 2021-06-08 DIAGNOSIS — I25.10 ATHEROSCLEROSIS OF NATIVE CORONARY ARTERY OF NATIVE HEART WITHOUT ANGINA PECTORIS: Primary | ICD-10-CM

## 2021-06-08 DIAGNOSIS — R06.02 SHORTNESS OF BREATH: ICD-10-CM

## 2021-06-08 DIAGNOSIS — R94.30 EJECTION FRACTION < 50%: ICD-10-CM

## 2021-06-08 PROCEDURE — 1111F DSCHRG MED/CURRENT MED MERGE: CPT | Performed by: INTERNAL MEDICINE

## 2021-06-08 PROCEDURE — G8417 CALC BMI ABV UP PARAM F/U: HCPCS | Performed by: INTERNAL MEDICINE

## 2021-06-08 PROCEDURE — G8400 PT W/DXA NO RESULTS DOC: HCPCS | Performed by: INTERNAL MEDICINE

## 2021-06-08 PROCEDURE — 1123F ACP DISCUSS/DSCN MKR DOCD: CPT | Performed by: INTERNAL MEDICINE

## 2021-06-08 PROCEDURE — 1090F PRES/ABSN URINE INCON ASSESS: CPT | Performed by: INTERNAL MEDICINE

## 2021-06-08 PROCEDURE — 93000 ELECTROCARDIOGRAM COMPLETE: CPT | Performed by: INTERNAL MEDICINE

## 2021-06-08 PROCEDURE — 1036F TOBACCO NON-USER: CPT | Performed by: INTERNAL MEDICINE

## 2021-06-08 PROCEDURE — 99213 OFFICE O/P EST LOW 20 MIN: CPT | Performed by: INTERNAL MEDICINE

## 2021-06-08 PROCEDURE — 4040F PNEUMOC VAC/ADMIN/RCVD: CPT | Performed by: INTERNAL MEDICINE

## 2021-06-08 PROCEDURE — G8427 DOCREV CUR MEDS BY ELIG CLIN: HCPCS | Performed by: INTERNAL MEDICINE

## 2021-06-08 PROCEDURE — 3017F COLORECTAL CA SCREEN DOC REV: CPT | Performed by: INTERNAL MEDICINE

## 2021-06-08 NOTE — PROGRESS NOTES
[facility]  Bon Burns. Alverto Mays M.D. [facility] Landry M.D.  Ivonne Bosworth. Dylan Schultz M.D. Mariela Najera M.D. Isom Pouch, Herold Heather, M.D. MD Mj Alexandra Maffucci   1955  Jocelyn Wang MD    This 61-year-old woman is seen for outpatient cardiac consultation today She.admitted on 5/19/2021 for VT/Vfib arrest while in HD. Per note review, she was receiving HD when she was noted to become unresponsive, no pulse. She was shocked once in the field w/ ROSC by EMS, and transferred immediately to [facility]. .Cardiac catheterization and PCI 05/21/2021: LM 0%. LAD 95%. Circumflex 100% stenosis OM (). Dominant RCA 90%. Stenting LAD with 2.5 MARYLOU. Stenting RCA. 2.5 MARYLOU. TTE 05/19/2021: Estimated EF 25-35%. Stage III diastolic dysfunction. Left atrium moderately dilated. Mitral valve fibroblastoma. Mild mitral regurgitation    Today she is overall feeling well. She remains anxious about her cardiac status. She continues to wear an external defibrillator. She is not having any chest pain or dyspnea although sedentary. She has a history of chronic ischemic heart disease hypertension, hyperlipidemia, obesity and breast cancer. She has had chronic renal failure. She has been declined renal transplant because she is a Oriental orthodox. She follows with . She is followed at the [facility]. She has severe concentric LVH in 2011. Cardiac catheterization in 2012 showed moderate ASCVD. Medical History:  1. 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 2. Hypertension. 3. Hyperlipidemia. 4. Obesity. 5. Right breast cancer with mastectomy  2005 followed by radiation and Arimidex. Chronic  lymphedema  right arm. 6. [facility] admission with SOB and chest discomfort 08/2011. Cardiac enzymes negative. CT angiogram of the chest negative for PE and dissection. EKG:  NSR, nonspecific ST T abnormalities.     7. Echo 08/2011 with severe concentric LVH, normal wall motion. LAE, RVE, Stage I diastolic dysfunction. 8. MPS 08/2011 with small area of lateral ischemia, normal wall motion and EF. 9. Cardiac catheterization 08/30/2012:  CX 90% mid stenosis, OM2 totally occluded. LAD 20% stenosis, RVA 50% stenosis, LV normal.  She was treated medically. 10. [facility] admission 02/13/2013 with SOB, orthopnea, nonproductive nocturnal cough and edema. CXR:  cardiomegaly and bilateral effusion. EKG:  NSR, LAE, low voltage, PVC, poor R wave progression. BUN 23, Cr 1.5, BNP 1868. Cardiac enzymes negative. Hemoglobin 9.9, WBC 5.4. Rx with aggressive diuretic therapy. 11. Echo 02/14/2013:  LVH, normal LV systolic function, Stage II diastolic dysfunction, LAE, normal RVSP, no valvulopathy. 12. Lifetime non-smoker. 13. NKDA. She had worsening renal function in the past when taking lasix. 14.  echo 11/2017, EF 55%. Mild LVH. Stage II diastolic dysfunction. Moderate to severe left atrial dilatation. Mild MR.  RVSP 76.  15. abdominal CT 2017: Extensive anasarca. Extensive arteriosclerotic disease. 16. Surgical history: Correction of Charcot joint right, carpal tunnel release, cholecystectomy, dialysis fistula creation, mastectomy, laser rx of eyes, spinal cord stimulator, surgery for retinal detachment. Spinal cord decompression L2  17. Admitted on 5/19/2021 for VT/Vfib arrest while in HD. Per note review, she was receiving HD when she was noted to become unresponsive, no pulse. She was shocked once in the field w/ ROSC by EMS, and transferred immediately to [address] stress MPS 05/19/2021: EF 25%. Regional wall motion abnormalities. Large fixed defect apex and septum. Partially reversible defects anterolateral wall   19. Cardiac catheterization and PCI 05/21/2021: LM 0%. LAD 95%. Circumflex 100% stenosis OM (). Dominant RCA 90%. Stenting LAD with 2.5 MARYLOU. Stenting RCA. 2.5 MARYLOU.   20. TTE 05/19/2021: daily, Disp: 30 patch, Rfl: 0    aspirin 81 MG EC tablet, Take 1 tablet by mouth daily, Disp: 30 tablet, Rfl: 3    hydrALAZINE (APRESOLINE) 10 MG tablet, Take 1 tablet by mouth every 8 hours, Disp: 270 tablet, Rfl: 1    metoprolol succinate (TOPROL XL) 25 MG extended release tablet, Take 1 tablet by mouth daily, Disp: 90 tablet, Rfl: 1    ticagrelor (BRILINTA) 90 MG TABS tablet, Take 1 tablet by mouth 2 times daily, Disp: 180 tablet, Rfl: 1    midodrine (PROAMATINE) 5 MG tablet, Take 1 tablet by mouth as needed (may repeat x1 durring HD for assymptomatic SBP<100), Disp: 90 tablet, Rfl: 0    B Complex-C-Folic Acid (BONI CAPS) 1 MG CAPS, Take 1 mg by mouth daily, Disp: , Rfl:     bumetanide (BUMEX) 2 MG tablet, Take 2 mg by mouth three times a week Given Sunday,Tuesday,Thursday, Disp: , Rfl:     insulin glargine (LANTUS SOLOSTAR) 100 UNIT/ML injection pen, Inject 8 Units into the skin nightly, Disp: , Rfl:     insulin lispro, 1 Unit Dial, (HUMALOG KWIKPEN) 100 UNIT/ML SOPN, Inject 0-4 Units into the skin 3 times daily, Disp: , Rfl:     isosorbide mononitrate (IMDUR) 30 MG extended release tablet, Take 30 mg by mouth daily, Disp: , Rfl:     omeprazole (PRILOSEC) 20 MG delayed release capsule, Take 20 mg by mouth nightly, Disp: , Rfl:     atorvastatin (LIPITOR) 40 MG tablet, Take 1 tablet by mouth daily, Disp: 90 tablet, Rfl: 1    allopurinol (ZYLOPRIM) 100 MG tablet, Take 1 tablet by mouth daily, Disp: 90 tablet, Rfl: 1    LUMIGAN 0.01 % SOLN ophthalmic drops, Place 1 drop into the right eye nightly , Disp: , Rfl:     VELPHORO 500 MG CHEW, Take 2 tablets by mouth 3 times daily (with meals) , Disp: , Rfl:     COMBIGAN 0.2-0.5 % ophthalmic solution, Place 1 drop into the right eye every 12 hours , Disp: , Rfl: 7      Note: This report was completed utilizing computer voice recognition software.  Every effort has been made to ensure accuracy, however; inadvertent computerized transcription errors may be present.     --Oren Lawrence MD on 6/7/2021 at 8:50 PM     CC Dr Irlanda Montalvo I have personally performed a face to face diagnostic evaluation on this patient. I have reviewed the ACP note and agree with the history, exam and plan of care, except as noted.

## 2021-07-01 ENCOUNTER — OFFICE VISIT (OUTPATIENT)
Dept: FAMILY MEDICINE CLINIC | Age: 66
End: 2021-07-01
Payer: COMMERCIAL

## 2021-07-01 VITALS
HEIGHT: 70 IN | WEIGHT: 186.8 LBS | DIASTOLIC BLOOD PRESSURE: 52 MMHG | BODY MASS INDEX: 26.74 KG/M2 | HEART RATE: 65 BPM | SYSTOLIC BLOOD PRESSURE: 97 MMHG | TEMPERATURE: 97.5 F

## 2021-07-01 DIAGNOSIS — M19.011 ARTHRITIS OF RIGHT SHOULDER REGION: ICD-10-CM

## 2021-07-01 DIAGNOSIS — E11.9 INSULIN DEPENDENT TYPE 2 DIABETES MELLITUS (HCC): ICD-10-CM

## 2021-07-01 DIAGNOSIS — Z79.4 INSULIN DEPENDENT TYPE 2 DIABETES MELLITUS (HCC): ICD-10-CM

## 2021-07-01 DIAGNOSIS — I46.9 CARDIAC ARREST (HCC): ICD-10-CM

## 2021-07-01 DIAGNOSIS — Z76.0 MEDICATION REFILL: ICD-10-CM

## 2021-07-01 DIAGNOSIS — F51.4 NIGHT TERRORS: Primary | ICD-10-CM

## 2021-07-01 PROCEDURE — 4040F PNEUMOC VAC/ADMIN/RCVD: CPT | Performed by: FAMILY MEDICINE

## 2021-07-01 PROCEDURE — 2022F DILAT RTA XM EVC RTNOPTHY: CPT | Performed by: FAMILY MEDICINE

## 2021-07-01 PROCEDURE — 1036F TOBACCO NON-USER: CPT | Performed by: FAMILY MEDICINE

## 2021-07-01 PROCEDURE — G8417 CALC BMI ABV UP PARAM F/U: HCPCS | Performed by: FAMILY MEDICINE

## 2021-07-01 PROCEDURE — 99213 OFFICE O/P EST LOW 20 MIN: CPT | Performed by: FAMILY MEDICINE

## 2021-07-01 PROCEDURE — 1090F PRES/ABSN URINE INCON ASSESS: CPT | Performed by: FAMILY MEDICINE

## 2021-07-01 PROCEDURE — 3017F COLORECTAL CA SCREEN DOC REV: CPT | Performed by: FAMILY MEDICINE

## 2021-07-01 PROCEDURE — 3044F HG A1C LEVEL LT 7.0%: CPT | Performed by: FAMILY MEDICINE

## 2021-07-01 PROCEDURE — G8427 DOCREV CUR MEDS BY ELIG CLIN: HCPCS | Performed by: FAMILY MEDICINE

## 2021-07-01 PROCEDURE — G8400 PT W/DXA NO RESULTS DOC: HCPCS | Performed by: FAMILY MEDICINE

## 2021-07-01 PROCEDURE — 1123F ACP DISCUSS/DSCN MKR DOCD: CPT | Performed by: FAMILY MEDICINE

## 2021-07-01 RX ORDER — BLOOD-GLUCOSE METER
KIT MISCELLANEOUS
Qty: 1 KIT | Refills: 0 | Status: SHIPPED | OUTPATIENT
Start: 2021-07-01 | End: 2021-07-08

## 2021-07-01 RX ORDER — ISOSORBIDE MONONITRATE 30 MG/1
30 TABLET, EXTENDED RELEASE ORAL DAILY
Qty: 30 TABLET | Refills: 1 | Status: SHIPPED
Start: 2021-07-01 | End: 2021-09-07 | Stop reason: SDUPTHER

## 2021-07-01 RX ORDER — LIDOCAINE 4 G/G
1 PATCH TOPICAL DAILY
Qty: 30 PATCH | Refills: 1 | Status: SHIPPED | OUTPATIENT
Start: 2021-07-01 | End: 2021-07-31

## 2021-07-01 ASSESSMENT — ENCOUNTER SYMPTOMS
DIARRHEA: 0
TROUBLE SWALLOWING: 0
VOMITING: 0
SHORTNESS OF BREATH: 0
WHEEZING: 0
COUGH: 0
SORE THROAT: 0
NAUSEA: 0
CONSTIPATION: 0

## 2021-07-01 NOTE — PROGRESS NOTES
7/1/2021    Milton Rodriguez is a 73 y.o. femalehere for:    HPI:    Night Terrors: These are much improved. Not having them anymore. Not waking up at night. No reporting of screaming from those in her house. Cardiac Arrest: She had an arrest in Dialysis. She is following closely with Cardiology. She has continued her dialysis MWF and no further issues. There may be a reduction in some of the medicines due to some lower blood pressures. She has a life vest. Discussion of further planning with Cards. Gets occasional chest discomfort from the compressions but no arrythmias or shocks from the vest.     Shoulder pain: She still has some limitation in ROM and some pain but the patches are helping. Needs refill of these. Working with PT on right shoulder. Not sure if she will ever have full return of function. COVID Vaccine Status:  Moderna     BP Readings from Last 3 Encounters:   07/01/21 (!) 97/52   06/08/21 90/60   06/03/21 (!) 95/40     Current Outpatient Medications   Medication Sig Dispense Refill    isosorbide mononitrate (IMDUR) 30 MG extended release tablet Take 1 tablet by mouth daily 30 tablet 1    lidocaine 4 % external patch Place 1 patch onto the skin daily 30 patch 1    glucose monitoring (FREESTYLE) kit Check blood sugars 3 times daily 1 kit 0    aspirin 81 MG EC tablet Take 1 tablet by mouth daily 30 tablet 3    hydrALAZINE (APRESOLINE) 10 MG tablet Take 1 tablet by mouth every 8 hours 270 tablet 1    metoprolol succinate (TOPROL XL) 25 MG extended release tablet Take 1 tablet by mouth daily 90 tablet 1    ticagrelor (BRILINTA) 90 MG TABS tablet Take 1 tablet by mouth 2 times daily 180 tablet 1    midodrine (PROAMATINE) 5 MG tablet Take 1 tablet by mouth as needed (may repeat x1 durring HD for assymptomatic SBP<100) 90 tablet 0    B Complex-C-Folic Acid (BONI CAPS) 1 MG CAPS Take 1 mg by mouth daily      bumetanide (BUMEX) 2 MG tablet Take 2 mg by mouth three times a week Given Sunday,Tuesday,Thursday      insulin glargine (LANTUS SOLOSTAR) 100 UNIT/ML injection pen Inject 8 Units into the skin nightly      insulin lispro, 1 Unit Dial, (HUMALOG KWIKPEN) 100 UNIT/ML SOPN Inject 0-4 Units into the skin 3 times daily      omeprazole (PRILOSEC) 20 MG delayed release capsule Take 20 mg by mouth nightly      atorvastatin (LIPITOR) 40 MG tablet Take 1 tablet by mouth daily 90 tablet 1    allopurinol (ZYLOPRIM) 100 MG tablet Take 1 tablet by mouth daily 90 tablet 1    LUMIGAN 0.01 % SOLN ophthalmic drops Place 1 drop into the right eye nightly       VELPHORO 500 MG CHEW Take 2 tablets by mouth 3 times daily (with meals)       COMBIGAN 0.2-0.5 % ophthalmic solution Place 1 drop into the right eye every 12 hours   7     No current facility-administered medications for this visit. Allergies   Allergen Reactions    Lasix [Furosemide] Other (See Comments)     States her kidneys shut down       Past Medical & Surgical History:      Diagnosis Date    Acute infection of bone ([address]     infection of rt foot, resolved.     Anemia of chronic disease     Breast cancer ([address]     right breast, 2008/ bladder, 2006- last chemotherapy \"years ago\"    CAD (coronary artery disease)     Carpal tunnel syndrome     bilat - for OR left hand 3-17-20     Chronic diastolic CHF (congestive heart failure) ([address] 09/23/2014 9/23/14- echocardiogram revealed moderate LV concentric hypertrophy, stage III diastolic dysfunction, mild tricuspid regurgitation    CKD (chronic kidney disease) stage 4, GFR 15-29 ml/min ([facility])     Diabetic retinopathy ([address]     Glaucoma     Hemod[facility] ([facility]     [facility]  mon wed fri- Dr. Greer George - left arm fistula     Hyperkalemia, diminished renal excretion 11/9/2017    Hypertension     Hypoglycemia unawareness in type 1 diabetes mellitus ([address] 11/7/2017    Insulin dependent type 2 diabetes mellitus (HCC)     Neuropathy     feet    Osteomyelitis due to secondary diabetes ([address]     rt great toe with amputation    Patient is Orthodoxy 11/7/2017    Refusal of blood product     patient states she dose not take blood transfusion    Ventricular hypertrophy     Vitreous hemorrhage ([address]     left eye     Past Surgical History:   Procedure Laterality Date    AMPUTATION      right great toe    ANKLE SURGERY      correction on charcot joint of right ankle    CARPAL TUNNEL RELEASE Left 3/17/2020    LEFT CARPAL TUNNEL RELEASE performed by Madhuri Galvin MD at [address]      bilateral    CHOLECYSTECTOMY      COLONOSCOPY      CYSTOSCOPY      DIALYSIS FISTULA CREATION Left 01/31/2018    upper arm/Dr. Umesh Meyer    ECHO COMPL W DOP COLOR FLOW  2/14/2013         ECHO COMPLETE  9/17/2013         MASTECTOMY      right    OTHER SURGICAL HISTORY  9/27/2011    PPV, membranectomy, laser Right eye    OTHER SURGICAL HISTORY  insertion lumbar drain insertion    10/12/`14    OTHER SURGICAL HISTORY  10/22/15    percutaneous lead placement for spinal cord stimulator    OTHER SURGICAL HISTORY  11/03/2015    Spinal; cord stimulator- turned off as of 3-10-20     CT AV ANAST,UP ARM BASILIC VEIN TRANSPOSIT Left 5/15/2018    TRANSPOSITION STAGE II AV FISTULA - LEFT UPPER ARM performed by Eliel Stone MD at [facility] ANGIOACCESS AV FISTULA Left 9/25/2018    SUPERFICIALIZATION AV FISTULA - LEFT ARM performed by Eliel Stone MD at [facility] W/VITRECTOMY ANY METH Left 4/10/2018    PARS PLANA VITRECTOMY 25 GAUGE RETINAL DETACHMENT REPAIR air fluid exchange, endolaser performed by Patricia Araujo MD at [address] TUNNELED VENOUS CATHETER PLACEMENT  11/15/2017    VITRECTOMY Left 04/10/2018    PARS PLANA VITRECTOMY; RETINAL DETACHMENT REPAIR; GAS BUBBLE; LASER LEFT EYE       Family History:      Problem Relation Age of Onset    Breast Cancer Mother 61    Hypertension Mother     Heart Disease Father     Prostate Cancer Father     Breast Cancer Maternal Grandmother 61       Social History:  Social History     Tobacco Use    Smoking status: Never Smoker    Smokeless tobacco: Never Used   Substance Use Topics    Alcohol use: No       Immunization History   Administered Date(s) Administered    Pneumococcal Polysaccharide ([id number]) 12/04/2018       Review of Systems   Constitutional: Negative for chills and fever. HENT: Negative for sore throat and trouble swallowing. Respiratory: Negative for cough, shortness of breath and wheezing. Cardiovascular: Positive for chest pain (Occasional discomfort). Negative for palpitations and leg swelling. Gastrointestinal: Negative for constipation, diarrhea, nausea and vomiting. Genitourinary: Negative for dysuria and hematuria. Musculoskeletal: Positive for arthralgias (Right shoulders). Neurological: Negative for light-headedness and headaches. Psychiatric/Behavioral: Negative for agitation and confusion. VS:  BP (!) 97/52   Pulse 65   Temp 97.5 °F (36.4 °C) (Temporal)   Ht 5' 10\" (1.778 m)   Wt 186 lb 12.8 oz (84.7 kg)   BMI 26.80 kg/m²     Physical Exam  Vitals reviewed. Constitutional:       General: She is not in acute distress. Appearance: Normal appearance. She is not ill-appearing. HENT:      Head: Normocephalic and atraumatic. Cardiovascular:      Rate and Rhythm: Normal rate and regular rhythm. Pulses: Normal pulses. Heart sounds: Murmur (Systolic, Aortic) heard. Pulmonary:      Effort: Pulmonary effort is normal. No respiratory distress. Breath sounds: No wheezing, rhonchi or rales. Musculoskeletal:         General: Tenderness (Right shoulder, decreased ROM) present. Cervical back: Normal range of motion. Right lower leg: No edema. Left lower leg: No edema. Skin:     General: Skin is warm and dry. Neurological:      General: No focal deficit present.       Mental Status: She is alert. Sensory: No sensory deficit. Motor: No weakness. Gait: Gait abnormal (uses walker). Assessment/Plan:    1. Night terrors  Resolved  Doing well    2. Cardiac arrest ([facility]  Wearing life vest  Following with cardiology  May need med dose reductions due to lower BPs    3. Arthritis of right shoulder region  Stable  Continue with Lidocaine patch  Surgical correction will be deferred due to ongoing cardiac issues  - lidocaine 4 % external patch; Place 1 patch onto the skin daily  Dispense: 30 patch; Refill: 1    4. Insulin dependent type 2 diabetes mellitus ([facility]  Glucometer asya  Gave script for new one  - glucose monitoring (FREESTYLE) kit; Check blood sugars 3 times daily  Dispense: 1 kit; Refill: 0    5. Medication refill  Only refill needed is imdur  - isosorbide mononitrate (IMDUR) 30 MG extended release tablet; Take 1 tablet by mouth daily  Dispense: 30 tablet; Refill: 1      Follow up: 2 months with Dr. Kasey Mendoza    Patient agrees with the above stated plan.       Molly Servin MD  PGY-2 Family Medicine

## 2021-07-01 NOTE — PROGRESS NOTES
Subjective:    Raciel Silvestre is here in follow up after experiencing a cardiac arrest in dialysis. She was shocked and had chest compressions and night terrors which are better now. Overall she is feeling better.     ROS: Otherwise negative    Patient Active Problem List   Diagnosis    Diabetic retinopathy ([address]    Malignant neoplasm of right female breast ([address]    Atherosclerosis of native coronary artery of native heart without angina pectoris    Moderate obesity    Left ventricular hypertrophy    Herniated lumbar intervertebral disc    Lumbar degenerative disc disease    Pseudomeningocele    Lumbar radiculopathy    Lymphedema of arm    CKD (chronic kidney disease) stage 4, GFR 15-29 ml/min ([facility])    Insulin dependent type 2 diabetes mellitus (HCC)    Anemia of chronic disease    Chronic diastolic CHF (congestive heart failure) ([facility])    Neuropathy    Hypertension    Glaucoma    Refusal of blood product    Pancytopenia ([facility])    Controlled type 2 diabetes mellitus with chronic kidney disease on chronic dialysis, with long-term current use of insulin ([facility])    Vitreous hemorrhage ([address]    Patient is Synagogue    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 ([address]    Hyperkalemia, diminished renal excretion    Chronic pain syndrome    Lumbar post-laminectomy syndrome    Myalgia    Cervicalgia    Diabetic peripheral neuropathy (HCC)    ESRD (end stage renal disease) ([address]    Bilateral carpal tunnel syndrome    Spinal stenosis of lumbar region with neurogenic claudication    Cardiac arrest ([address]    ESRD (end stage renal disease) on dialysis ([address]    Mixed hyperlipidemia    Lymphedema of right upper extremity    Coronary artery disease involving native coronary artery of native heart with angina pectoris (HCC)    Ventricular tachycardia (HCC)       Past medical, surgical, family and social history were reviewed, non-contributory, and unchanged unless otherwise stated. Objective:    BP (!) 97/52   Pulse 65   Temp 97.5 °F (36.4 °C) (Temporal)   Ht 5' 10\" (1.778 m)   Wt 186 lb 12.8 oz (84.7 kg)   BMI 26.80 kg/m²     Exam is as noted by resident with the following changes, additions or corrections:      Assessment/Plan:        Claudia was seen today for 1 month follow-up. Diagnoses and all orders for this visit:    Night terrors    Cardiac arrest ([facility]    Arthritis of right shoulder region  -     lidocaine 4 % external patch; Place 1 patch onto the skin daily    Insulin dependent type 2 diabetes mellitus (HCC)  -     glucose monitoring (FREESTYLE) kit; Check blood sugars 3 times daily    Medication refill  -     isosorbide mononitrate (IMDUR) 30 MG extended release tablet; Take 1 tablet by mouth daily           Attending Physician Statement    I have reviewed the chart, including any radiology or labs. I have discussed the case, including pertinent history and exam findings with the resident. I agree with the assessment, plan and orders as documented by the resident. Please refer to the resident note for additional information.       Electronically signed by Slade Bryant DO on 7/7/2021 at 3:10 PM

## 2021-07-08 ENCOUNTER — OFFICE VISIT (OUTPATIENT)
Dept: CARDIOLOGY CLINIC | Age: 66
End: 2021-07-08
Payer: COMMERCIAL

## 2021-07-08 VITALS
BODY MASS INDEX: 26.83 KG/M2 | DIASTOLIC BLOOD PRESSURE: 70 MMHG | HEART RATE: 65 BPM | WEIGHT: 187.4 LBS | SYSTOLIC BLOOD PRESSURE: 122 MMHG | HEIGHT: 70 IN | RESPIRATION RATE: 18 BRPM

## 2021-07-08 DIAGNOSIS — I10 ESSENTIAL HYPERTENSION: ICD-10-CM

## 2021-07-08 DIAGNOSIS — I25.10 ATHEROSCLEROSIS OF NATIVE CORONARY ARTERY OF NATIVE HEART WITHOUT ANGINA PECTORIS: ICD-10-CM

## 2021-07-08 DIAGNOSIS — D15.1 PAPILLARY FIBROELASTOMA: ICD-10-CM

## 2021-07-08 DIAGNOSIS — I51.7 LEFT VENTRICULAR HYPERTROPHY: ICD-10-CM

## 2021-07-08 DIAGNOSIS — Z95.5 STENTED CORONARY ARTERY: ICD-10-CM

## 2021-07-08 DIAGNOSIS — I51.9 LV DYSFUNCTION: Primary | ICD-10-CM

## 2021-07-08 DIAGNOSIS — Z86.74 HX OF CARDIAC ARREST: ICD-10-CM

## 2021-07-08 PROCEDURE — G8417 CALC BMI ABV UP PARAM F/U: HCPCS | Performed by: INTERNAL MEDICINE

## 2021-07-08 PROCEDURE — 1123F ACP DISCUSS/DSCN MKR DOCD: CPT | Performed by: INTERNAL MEDICINE

## 2021-07-08 PROCEDURE — G8427 DOCREV CUR MEDS BY ELIG CLIN: HCPCS | Performed by: INTERNAL MEDICINE

## 2021-07-08 PROCEDURE — 3017F COLORECTAL CA SCREEN DOC REV: CPT | Performed by: INTERNAL MEDICINE

## 2021-07-08 PROCEDURE — 99214 OFFICE O/P EST MOD 30 MIN: CPT | Performed by: INTERNAL MEDICINE

## 2021-07-08 PROCEDURE — 1036F TOBACCO NON-USER: CPT | Performed by: INTERNAL MEDICINE

## 2021-07-08 PROCEDURE — 1090F PRES/ABSN URINE INCON ASSESS: CPT | Performed by: INTERNAL MEDICINE

## 2021-07-08 PROCEDURE — 4040F PNEUMOC VAC/ADMIN/RCVD: CPT | Performed by: INTERNAL MEDICINE

## 2021-07-08 PROCEDURE — G8400 PT W/DXA NO RESULTS DOC: HCPCS | Performed by: INTERNAL MEDICINE

## 2021-07-08 PROCEDURE — 93000 ELECTROCARDIOGRAM COMPLETE: CPT | Performed by: INTERNAL MEDICINE

## 2021-07-08 NOTE — PROGRESS NOTES
Patient Active Problem List   Diagnosis    Diabetic retinopathy [facility]    Malignant neoplasm of right female breast ([address]    Atherosclerosis of native coronary artery of native heart without angina pectoris    Moderate obesity    Left ventricular hypertrophy    Herniated lumbar intervertebral disc    Lumbar degenerative disc disease    Pseudomeningocele    Lumbar radiculopathy    Lymphedema of arm    CKD (chronic kidney disease) stage 4, GFR 15-29 ml/min ([facility])    Insulin dependent type 2 diabetes mellitus ([facility])    Anemia of chronic disease    Chronic diastolic CHF (congestive heart failure) ([facility])    Neuropathy    Hypertension    Glaucoma    Refusal of blood product    Pancytopenia ([facility])    Controlled type 2 diabetes mellitus with chronic kidney disease on chronic dialysis, with long-term current use of insulin ([facility])    Vitreous hemorrhage ([address]    Patient is Religious    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 ([address]    Hyperkalemia, diminished renal excretion    Chronic pain syndrome    Lumbar post-laminectomy syndrome    Myalgia    Cervicalgia    Diabetic peripheral neuropathy ([facility])    ESRD (end stage renal disease) ([facility]    Bilateral carpal tunnel syndrome    Spinal stenosis of lumbar region with neurogenic claudication    Cardiac arrest ([address]    ESRD (end stage renal disease) on dialysis ([address]    Mixed hyperlipidemia    Lymphedema of right upper extremity    Coronary artery disease involving native coronary artery of native heart with angina pectoris ([facility])    Ventricular tachycardia ([facility])       Current Outpatient Medications   Medication Sig Dispense Refill    isosorbide mononitrate (IMDUR) 30 MG extended release tablet Take 1 tablet by mouth daily 30 tablet 1    lidocaine 4 % external patch Place 1 patch onto the skin daily 30 patch 1    aspirin 81 MG EC tablet Take 1 tablet by mouth daily 30 tablet 3    hydrALAZINE (APRESOLINE) 10 MG tablet Take 1 tablet by mouth every 8 hours 270 tablet 1    metoprolol succinate (TOPROL XL) 25 MG extended release tablet Take 1 tablet by mouth daily 90 tablet 1    ticagrelor (BRILINTA) 90 MG TABS tablet Take 1 tablet by mouth 2 times daily 180 tablet 1    midodrine (PROAMATINE) 5 MG tablet Take 1 tablet by mouth as needed (may repeat x1 durring HD for assymptomatic SBP<100) 90 tablet 0    B Complex-C-Folic Acid (BONI CAPS) 1 MG CAPS Take 1 mg by mouth daily      bumetanide (BUMEX) 2 MG tablet Take 2 mg by mouth three times a week Given Sunday,Tuesday,Thursday      insulin glargine (LANTUS SOLOSTAR) 100 UNIT/ML injection pen Inject 8 Units into the skin nightly      insulin lispro, 1 Unit Dial, (HUMALOG KWIKPEN) 100 UNIT/ML SOPN Inject 0-4 Units into the skin 3 times daily      omeprazole (PRILOSEC) 20 MG delayed release capsule Take 20 mg by mouth nightly      atorvastatin (LIPITOR) 40 MG tablet Take 1 tablet by mouth daily 90 tablet 1    allopurinol (ZYLOPRIM) 100 MG tablet Take 1 tablet by mouth daily 90 tablet 1    LUMIGAN 0.01 % SOLN ophthalmic drops Place 1 drop into the right eye nightly       VELPHORO 500 MG CHEW Take 2 tablets by mouth 3 times daily (with meals)       COMBIGAN 0.2-0.5 % ophthalmic solution Place 1 drop into the right eye every 12 hours   7     No current facility-administered medications for this visit. CC:    Patient is seen in follow up for:  1. LV dysfunction    2. Atherosclerosis of native coronary artery of native heart without angina pectoris    3. Hx of cardiac arrest    4. Stented coronary artery    5. Essential hypertension    6. Left ventricular hypertrophy    7. Papillary fibroelastoma - mitral valve        HPI:  Patient is doing well without any specific cardiac problems. Patient denies any shortness of breath, chest pain, lightheadedness or dizziness. Patient is tolerating medications well without side effects.     Inqiring about LifeVest discontinuation. ROS:   General: No unusual weight gain, no change in exercise tolerance  Skin: No rash or itching  EENT: No vision changes or nosebleeds  Cardiovascular: No orthopnea or paroxysmal nocturnal dyspnea  Respiratory: No cough or hemoptysis  Gastrointestinal: No hematemesis or recent changes in bowel habits  Genitourinary: No hematuria, urgency or frequency  Musculoskeletal: No muscular weakness or joint swelling   Neurologic / Psychiatric: No incoordination or convulsions  Allergic / Immunologic/ Lymphatic / Endocrine: No anemia or bleeding tendency    Social History     Socioeconomic History    Marital status: Single     Spouse name: Not on file    Number of children: Not on file    Years of education: Not on file    Highest education level: Not on file   Occupational History    Not on file   Tobacco Use    Smoking status: Never Smoker    Smokeless tobacco: Never Used   Vaping Use    Vaping Use: Never used   Substance and Sexual Activity    Alcohol use: No    Drug use: No    Sexual activity: Not Currently   Other Topics Concern    Not on file   Social History Narrative    Not on file     Social Determinants of Health     Financial Resource Strain:     Difficulty of Paying Living Expenses:    Food Insecurity:     Worried About Running Out of Food in the Last Year:     [address] in the Last Year:    Transportation Needs:     Lack of Transportation (Medical):      Lack of Transportation (Non-Medical):    Physical Activity:     Days of Exercise per Week:     Minutes of Exercise per Session:    Stress:     Feeling of Stress :    Social Connections:     Frequency of Communication with Friends and Family:     Frequency of Social Gatherings with Friends and Family:     Attends [employer] Services:     Active Member of Clubs or Organizations:     Attends Club or Organization Meetings:     Marital Status:    Intimate Partner Violence:     Fear of Current or Ex-Partner:     Emotionally Abused:     Physically Abused:     Sexually Abused:        Family History   Problem Relation Age of Onset   [facility] Breast Cancer Mother 61    Hypertension Mother     Heart Disease Father     Prostate Cancer Father     Breast Cancer Maternal Grandmother 61       Past Medical History:   Diagnosis Date    Acute infection of bone ([address]     infection of rt foot, resolved.  Anemia of chronic disease     Breast cancer ([address]     right breast, 2008/ bladder, 2006- last chemotherapy \"years ago\"    CAD (coronary artery disease)     Carpal tunnel syndrome     bilat - for OR left hand 3-17-20     Chronic diastolic CHF (congestive heart failure) ([address] 09/23/2014 9/23/14- echocardiogram revealed moderate LV concentric hypertrophy, stage III diastolic dysfunction, mild tricuspid regurgitation    CKD (chronic kidney disease) stage 4, GFR 15-29 ml/min ([facility])     Diabetic retinopathy ([address]     Glaucoma     Hemodialysis patient ([address]     [facility]- Dr. Leann Orr - left arm fistula     Hyperkalemia, diminished renal excretion 11/9/2017    Hypertension     Hypoglycemia unawareness in type 1 diabetes mellitus ([address] 11/7/2017    Insulin dependent type 2 diabetes mellitus ([address]     Neuropathy     feet    Osteomyelitis due to secondary diabetes ([address]     rt great toe with amputation    Patient is Confucianist 11/7/2017    Refusal of blood product     patient states she dose not take blood transfusion    Ventricular hypertrophy     Vitreous hemorrhage ([address]     left eye       PHYSICAL EXAM:  CONSTITUTIONAL:  Well developed, well nourished    Vitals:    07/08/21 0919   BP: 122/70   Pulse: 65   Resp: 18   Weight: 187 lb 6.4 oz (85 kg)   Height: 5' 10\" (1.778 m)     HEAD & FACE: Normocephalic. Symmetric. EYES: No xanthelasma. Conjunctivae not injected. EARS, NOSE, MOUTH & THROAT: Good dentition. No oral pallor or cyanosis. NECK: No JVD at 30 degrees. No thyromegaly.   RESPIRATORY: Clear

## 2021-08-10 DIAGNOSIS — N18.4 TYPE 2 DIABETES MELLITUS WITH STAGE 4 CHRONIC KIDNEY DISEASE, UNSPECIFIED WHETHER LONG TERM INSULIN USE (HCC): ICD-10-CM

## 2021-08-10 DIAGNOSIS — E11.22 TYPE 2 DIABETES MELLITUS WITH STAGE 4 CHRONIC KIDNEY DISEASE, UNSPECIFIED WHETHER LONG TERM INSULIN USE (HCC): ICD-10-CM

## 2021-08-10 RX ORDER — PEN NEEDLE, DIABETIC 29 G X1/2"
NEEDLE, DISPOSABLE MISCELLANEOUS
Qty: 100 EACH | Refills: 0 | Status: SHIPPED
Start: 2021-08-10 | End: 2021-09-07 | Stop reason: SDUPTHER

## 2021-08-24 ENCOUNTER — HOSPITAL ENCOUNTER (OUTPATIENT)
Dept: CARDIOLOGY | Age: 66
Discharge: HOME | End: 2021-08-24
Payer: COMMERCIAL

## 2021-08-24 DIAGNOSIS — I51.9 LV DYSFUNCTION: ICD-10-CM

## 2021-08-24 PROCEDURE — 93308 TTE F-UP OR LMTD: CPT

## 2021-08-25 ENCOUNTER — TELEPHONE (OUTPATIENT)
Dept: CARDIOLOGY CLINIC | Age: 66
End: 2021-08-25

## 2021-08-26 NOTE — TELEPHONE ENCOUNTER
Patient notified and instructed on echo results and ok to discontinue lifevest.      Please advise on follow-up for patient

## 2021-09-07 ENCOUNTER — OFFICE VISIT (OUTPATIENT)
Dept: FAMILY MEDICINE CLINIC | Age: 66
End: 2021-09-07
Payer: COMMERCIAL

## 2021-09-07 VITALS
DIASTOLIC BLOOD PRESSURE: 66 MMHG | SYSTOLIC BLOOD PRESSURE: 118 MMHG | BODY MASS INDEX: 26.83 KG/M2 | HEART RATE: 66 BPM | OXYGEN SATURATION: 97 % | HEIGHT: 70 IN | TEMPERATURE: 97.5 F | WEIGHT: 187.4 LBS

## 2021-09-07 DIAGNOSIS — Z99.2 CONTROLLED TYPE 2 DIABETES MELLITUS WITH CHRONIC KIDNEY DISEASE ON CHRONIC DIALYSIS, WITH LONG-TERM CURRENT USE OF INSULIN (HCC): ICD-10-CM

## 2021-09-07 DIAGNOSIS — Z76.0 MEDICATION REFILL: ICD-10-CM

## 2021-09-07 DIAGNOSIS — N18.6 CONTROLLED TYPE 2 DIABETES MELLITUS WITH CHRONIC KIDNEY DISEASE ON CHRONIC DIALYSIS, WITH LONG-TERM CURRENT USE OF INSULIN (HCC): ICD-10-CM

## 2021-09-07 DIAGNOSIS — N18.6 ESRD (END STAGE RENAL DISEASE) (HCC): Primary | ICD-10-CM

## 2021-09-07 DIAGNOSIS — Z79.4 CONTROLLED TYPE 2 DIABETES MELLITUS WITH CHRONIC KIDNEY DISEASE ON CHRONIC DIALYSIS, WITH LONG-TERM CURRENT USE OF INSULIN (HCC): ICD-10-CM

## 2021-09-07 DIAGNOSIS — E11.22 CONTROLLED TYPE 2 DIABETES MELLITUS WITH CHRONIC KIDNEY DISEASE ON CHRONIC DIALYSIS, WITH LONG-TERM CURRENT USE OF INSULIN (HCC): ICD-10-CM

## 2021-09-07 DIAGNOSIS — S91.302A: ICD-10-CM

## 2021-09-07 LAB — HBA1C MFR BLD: 5.7 %

## 2021-09-07 PROCEDURE — 99214 OFFICE O/P EST MOD 30 MIN: CPT | Performed by: STUDENT IN AN ORGANIZED HEALTH CARE EDUCATION/TRAINING PROGRAM

## 2021-09-07 PROCEDURE — 3017F COLORECTAL CA SCREEN DOC REV: CPT | Performed by: STUDENT IN AN ORGANIZED HEALTH CARE EDUCATION/TRAINING PROGRAM

## 2021-09-07 PROCEDURE — 1123F ACP DISCUSS/DSCN MKR DOCD: CPT | Performed by: STUDENT IN AN ORGANIZED HEALTH CARE EDUCATION/TRAINING PROGRAM

## 2021-09-07 PROCEDURE — G8427 DOCREV CUR MEDS BY ELIG CLIN: HCPCS | Performed by: STUDENT IN AN ORGANIZED HEALTH CARE EDUCATION/TRAINING PROGRAM

## 2021-09-07 PROCEDURE — 4040F PNEUMOC VAC/ADMIN/RCVD: CPT | Performed by: STUDENT IN AN ORGANIZED HEALTH CARE EDUCATION/TRAINING PROGRAM

## 2021-09-07 PROCEDURE — G8400 PT W/DXA NO RESULTS DOC: HCPCS | Performed by: STUDENT IN AN ORGANIZED HEALTH CARE EDUCATION/TRAINING PROGRAM

## 2021-09-07 PROCEDURE — 2022F DILAT RTA XM EVC RTNOPTHY: CPT | Performed by: STUDENT IN AN ORGANIZED HEALTH CARE EDUCATION/TRAINING PROGRAM

## 2021-09-07 PROCEDURE — G8417 CALC BMI ABV UP PARAM F/U: HCPCS | Performed by: STUDENT IN AN ORGANIZED HEALTH CARE EDUCATION/TRAINING PROGRAM

## 2021-09-07 PROCEDURE — 3044F HG A1C LEVEL LT 7.0%: CPT | Performed by: STUDENT IN AN ORGANIZED HEALTH CARE EDUCATION/TRAINING PROGRAM

## 2021-09-07 PROCEDURE — 1090F PRES/ABSN URINE INCON ASSESS: CPT | Performed by: STUDENT IN AN ORGANIZED HEALTH CARE EDUCATION/TRAINING PROGRAM

## 2021-09-07 PROCEDURE — 83036 HEMOGLOBIN GLYCOSYLATED A1C: CPT | Performed by: STUDENT IN AN ORGANIZED HEALTH CARE EDUCATION/TRAINING PROGRAM

## 2021-09-07 PROCEDURE — 1036F TOBACCO NON-USER: CPT | Performed by: STUDENT IN AN ORGANIZED HEALTH CARE EDUCATION/TRAINING PROGRAM

## 2021-09-07 RX ORDER — ALLOPURINOL 100 MG/1
100 TABLET ORAL DAILY
Qty: 90 TABLET | Refills: 1 | Status: SHIPPED | OUTPATIENT
Start: 2021-09-07

## 2021-09-07 RX ORDER — GABAPENTIN 100 MG/1
100 CAPSULE ORAL PRN
Qty: 90 CAPSULE | Refills: 0 | Status: SHIPPED
Start: 2021-09-07 | End: 2021-11-23 | Stop reason: SDUPTHER

## 2021-09-07 RX ORDER — ISOSORBIDE MONONITRATE 30 MG/1
30 TABLET, EXTENDED RELEASE ORAL DAILY
Qty: 30 TABLET | Refills: 1 | Status: SHIPPED
Start: 2021-09-07 | End: 2021-12-06

## 2021-09-07 RX ORDER — METOPROLOL SUCCINATE 25 MG/1
25 TABLET, EXTENDED RELEASE ORAL DAILY
Qty: 90 TABLET | Refills: 1 | Status: SHIPPED
Start: 2021-09-07 | End: 2021-11-24 | Stop reason: SDUPTHER

## 2021-09-07 RX ORDER — PEN NEEDLE, DIABETIC 29 G X1/2"
NEEDLE, DISPOSABLE MISCELLANEOUS
Qty: 100 EACH | Refills: 5 | Status: SHIPPED
Start: 2021-09-07 | End: 2022-03-09

## 2021-09-07 RX ORDER — LANTHANUM CARBONATE 1000 MG/1
1000 TABLET, CHEWABLE ORAL
COMMUNITY
Start: 2021-08-09 | End: 2022-07-21

## 2021-09-07 RX ORDER — INSULIN LISPRO 100 [IU]/ML
0-4 INJECTION, SOLUTION INTRAVENOUS; SUBCUTANEOUS 3 TIMES DAILY
Qty: 3 ML | Refills: 1 | Status: CANCELLED | OUTPATIENT
Start: 2021-09-07

## 2021-09-07 RX ORDER — ATORVASTATIN CALCIUM 40 MG/1
40 TABLET, FILM COATED ORAL DAILY
Qty: 90 TABLET | Refills: 1 | Status: SHIPPED
Start: 2021-09-07 | End: 2022-03-09

## 2021-09-07 ASSESSMENT — ENCOUNTER SYMPTOMS
RHINORRHEA: 0
NAUSEA: 0
COUGH: 0
ABDOMINAL PAIN: 0
SHORTNESS OF BREATH: 0
VOMITING: 0

## 2021-09-07 NOTE — PROGRESS NOTES
[facility]  Office Progress Note  Dr. Stacie Rowan      Patient:  Rose Song 73 y.o. female     Date of Service: 9/7/21      Chief complaint:   Chief Complaint   Patient presents with    Diabetes    Hyperlipidemia    Chronic Kidney Disease         History of Present Illness   The patient is a 73 y.o. female  here to follow up of their DM, HLD, CKD     In may went into cardiac arrest while getting dialysis  -happened a few weeks after getting COVID-she was feeling ill for a while and once she started to feel better she went in to cardiac arrest that day at dialysis  -Saw Dr. Altaf Askew. Was wearing life vest which was discontinued 1.5 weeks ago  -She has an upcoming appointment with Dr. Altaf Askew  -Has not had any other epsidoes  -No CP, SOB, palpitations currently.  -Discussed importance of going to ER in the setting of any cardiac symptoms      BP Readings from Last 3 Encounters:   09/07/21 118/66   07/08/21 122/70   07/01/21 (!) 97/52     neuropathy  -EMG scheduled,  -Appt with neurology  -has been taking gabapentin on days she does dialysis which is when the pain is the worst        Diabetes  -glucose has been \"up and down\" at home  -this AM was 120 which is higher than normal  -on 8 units of Lantus nightly  -once every few days she uses her sliding scale  -Has occasionally been in the upper 70s or 80s  Lab Results   Component Value Date    LABA1C 5.7 09/07/2021     No results found for: EAG    L foot wound  -following with podiatry  -she believes it was debrided  -small amount of clear drainage  -not a lot of pain associated with it    Her nephrologist is Dr. Jacquie Julien. She gets dialysis MWF    Dr. Castanon Lidia told her she needs shoulder replacement-her R shoulder is the most painful joint. Also has lymphedema in the R arm-hx of breast cancer. She also has a history of carpal tunnel release and was told she may eventually need trigger fingers released.     She was supposed to have her DEXA in May at [facility]. Planning on rescheduling. Past Medical History:      Diagnosis Date    Acute infection of bone ([address]     infection of rt foot, resolved.  Anemia of chronic disease     Breast cancer ([address]     right breast, 2008/ bladder, 2006- last chemotherapy \"years ago\"    CAD (coronary artery disease)     Carpal tunnel syndrome     bilat - for OR left hand 3-17-20     Chronic diastolic CHF (congestive heart failure) ([address] 09/23/2014 9/23/14- echocardiogram revealed moderate LV concentric hypertrophy, stage III diastolic dysfunction, mild tricuspid regurgitation    CKD (chronic kidney disease) stage 4, GFR 15-29 ml/min ([facility])     Diabetic retinopathy ([address]     Glaucoma     Hemodialysis patient ([address]     [facility] wed fri- Dr. García Limbo - left arm fistula     Hyperkalemia, diminished renal excretion 11/9/2017    Hypertension     Hypoglycemia unawareness in type 1 diabetes mellitus ([address] 11/7/2017    Insulin dependent type 2 diabetes mellitus ([address]     Neuropathy     feet    Osteomyelitis due to secondary diabetes ([address]     rt great toe with amputation    Patient is [employer]ism 11/7/2017    Refusal of blood product     patient states she dose not take blood transfusion    Ventricular hypertrophy     Vitreous hemorrhage ([address]     left eye       Review of Systems:   Review of Systems   Constitutional: Negative for chills and fever. HENT: Negative for congestion and rhinorrhea. Respiratory: Negative for cough and shortness of breath. Cardiovascular: Negative for chest pain and leg swelling. Gastrointestinal: Negative for abdominal pain, nausea and vomiting. Genitourinary: Negative for dysuria and hematuria. Musculoskeletal: Negative for arthralgias and myalgias. Skin: Negative for rash and wound. Neurological: Negative for dizziness and light-headedness.        Physical Exam   Vitals: /66   Pulse 66   Temp 97.5 °F (36.4 °C)   Ht 5' 10\" (1.778 m)   Wt 187 lb 6.4 oz (85 kg)   SpO2 97%   BMI 26.89 kg/m²   Physical Exam  Musculoskeletal:      Right foot: No deformity. Left foot: No deformity. Feet:      Right foot:      Protective Sensation: 4 sites tested. 4 sites sensed. Skin integrity: No ulcer, blister, skin breakdown or callus. Left foot:      Protective Sensation: 4 sites tested. 4 sites sensed. Skin integrity: No ulcer, blister, skin breakdown or callus. Comments: Missing R great toe        General:  Well developed, well nourished, well groomed. No apparent distress. HEENT:  Normocephalic, atraumatic. No scleral icterus. No conjunctival injection. No nasal discharge. Heart:  RRR, +murmur, gallops, or rubs  Lungs:  CTA bilaterally, bilat symmetrical expansion, no wheeze, rales, or rhonchi  Abdomen: Bowel sounds present, soft, nontender, no masses, no organomegaly, no peritoneal signs  Extremities:  . There are 2 shallow, clean, pale appearing wound on the medial posterior portion of the L foot. There is no pain, no erythema, no pus or induration. Neuro:  Alert and oriented x3, no focal deficits      Assessment and Plan       1. Controlled type 2 diabetes mellitus with chronic kidney disease on chronic dialysis, with long-term current use of insulin (HCC)  - in light of 5.7 A1c Decrease insulin. Stop sliding scale. Lantus to 5U nightly. Discussed monitoring glucose and that I would prefer for her glucose to stay above the 90/100 range  - POCT glycosylated hemoglobin (Hb A1C)  - blood glucose test strips (ASCENSIA AUTODISC VI;ONE TOUCH ULTRA TEST VI) strip; 1 each by In Vitro route 2 times daily  Dispense: 200 each; Refill: 5  - Diabetic Foot Exam    2. ESRD (end stage renal disease) ([facility]  - Continue following with nephrology/ dialysis    3. Open wound of foot excluding toes without complication, left, initial encounter  - not infected. Continue following with podiatry    4.  Medication refill  - isosorbide mononitrate (IMDUR) 30 MG extended release tablet; Take 1 tablet by mouth daily  Dispense: 30 tablet; Refill: 1  - allopurinol (ZYLOPRIM) 100 MG tablet; Take 1 tablet by mouth daily  Dispense: 90 tablet; Refill: 1  - metoprolol succinate (TOPROL XL) 25 MG extended release tablet; Take 1 tablet by mouth daily  Dispense: 90 tablet; Refill: 1  - ticagrelor (BRILINTA) 90 MG TABS tablet; Take 1 tablet by mouth 2 times daily  Dispense: 180 tablet; Refill: 1  - gabapentin (NEURONTIN) 100 MG capsule; Take 1 capsule by mouth as needed (with dialysis) for up to 30 days. Intended supply: 30 days  Dispense: 90 capsule; Refill: 0    To call [facility] to obtain mammography. Discuss DEXA at next visit    Counseled regarding above diagnosis, including possible risks and complications,  especially if left uncontrolled. Counseled regarding the possible side effects, risks, benefits and alternatives to treatment;patient and/or guardian verbalizes understanding, agrees, feels comfortable with and wishes to proceed with above treatment plan. Call or go to ED immediately if symptoms worsen or persist. Advised patient to call with any new medication issues, and, as applicable, read all Rx info from pharmacy to assure aware of all possible risks and side effects of medicationbefore taking. Patient and/or guardian given opportunity to ask questions/raise concerns. The patient verbalized comfort and understanding ofinstructions. Return to Office: Return in about 3 months (around 12/7/2021) for Blood Pressure Check, ESRD, discuss dxa.     Medication List:    Current Outpatient Medications   Medication Sig Dispense Refill    isosorbide mononitrate (IMDUR) 30 MG extended release tablet Take 1 tablet by mouth daily 30 tablet 1    allopurinol (ZYLOPRIM) 100 MG tablet Take 1 tablet by mouth daily 90 tablet 1    atorvastatin (LIPITOR) 40 MG tablet Take 1 tablet by mouth daily 90 tablet 1    metoprolol succinate (TOPROL XL) 25 MG extended release tablet Take 1 tablet by mouth daily 90 tablet 1    Insulin Pen Needle (ULTICARE MINI PEN NEEDLES) 31G X 6 MM MISC USE 1 PEN NEEDLE 4 times daily 100 each 5    blood glucose test strips (ASCENSIA AUTODISC VI;ONE TOUCH ULTRA TEST VI) strip 1 each by In Vitro route 2 times daily 200 each 5    ticagrelor (BRILINTA) 90 MG TABS tablet Take 1 tablet by mouth 2 times daily 180 tablet 1    gabapentin (NEURONTIN) 100 MG capsule Take 1 capsule by mouth as needed (with dialysis) for up to 30 days. Intended supply: 30 days 90 capsule 0    hydrALAZINE (APRESOLINE) 10 MG tablet Take 1 tablet by mouth every 8 hours (Patient taking differently: Take 10 mg by mouth 2 times daily ) 270 tablet 1    lanthanum (FOSRENOL) 1000 MG chewable tablet CHEW AND SWALLOW ONE TABLET BY MOUTH THREE TIMES DAILY WITH MEALS      aspirin 81 MG EC tablet Take 1 tablet by mouth daily 30 tablet 3    midodrine (PROAMATINE) 5 MG tablet Take 1 tablet by mouth as needed (may repeat x1 durring HD for assymptomatic SBP<100) 90 tablet 0    B Complex-C-Folic Acid (BONI CAPS) 1 MG CAPS Take 1 mg by mouth daily      bumetanide (BUMEX) 2 MG tablet Take 2 mg by mouth three times a week Given Sunday,Tuesday,Thursday      insulin glargine (LANTUS SOLOSTAR) 100 UNIT/ML injection pen Inject 5 Units into the skin nightly      insulin lispro, 1 Unit Dial, (HUMALOG KWIKPEN) 100 UNIT/ML SOPN Inject 0-4 Units into the skin 3 times daily      omeprazole (PRILOSEC) 20 MG delayed release capsule Take 20 mg by mouth nightly      LUMIGAN 0.01 % SOLN ophthalmic drops Place 1 drop into the right eye nightly       VELPHORO 500 MG CHEW Take 2 tablets by mouth 3 times daily (with meals)  (Patient not taking: Reported on 9/7/2021)      COMBIGAN 0.2-0.5 % ophthalmic solution Place 1 drop into the right eye every 12 hours   7     No current facility-administered medications for this visit.         Mey Ragsdale MD     This document may have been prepared at least partially through the use of voice recognition software. Although effort is taken to assure the accuracy ofthis document, it is possible that grammatical, syntax, or spelling errors may occur.

## 2021-09-09 ENCOUNTER — HOSPITAL ENCOUNTER (OUTPATIENT)
Dept: NEUROLOGY | Age: 66
Discharge: HOME | End: 2021-09-09
Payer: COMMERCIAL

## 2021-09-09 VITALS — HEIGHT: 70 IN | WEIGHT: 186 LBS | BODY MASS INDEX: 26.63 KG/M2

## 2021-09-09 PROCEDURE — 95913 NRV CNDJ TEST 13/> STUDIES: CPT | Performed by: PSYCHIATRY & NEUROLOGY

## 2021-09-09 PROCEDURE — 95886 MUSC TEST DONE W/N TEST COMP: CPT | Performed by: PSYCHIATRY & NEUROLOGY

## 2021-09-09 PROCEDURE — 95913 NRV CNDJ TEST 13/> STUDIES: CPT

## 2021-09-09 PROCEDURE — 95886 MUSC TEST DONE W/N TEST COMP: CPT

## 2021-09-09 NOTE — PROCEDURES
Ulnar - ADM NR NR NR   L Median - APB 42.6 6.9 35.7   L Ulnar - ADM 38.0 4.5 33.5       H Reflex      Nerve Lat Hmax    ms   L Tibial - Soleus NR       EMG         EMG Summary Table     Spontaneous MUAP Recruitment   Muscle IA Fib PSW Fasc H.F. Amp Dur. PPP Pattern   L. First dorsal interosseous N None None None None N N N Sl Decr   L. Abductor pollicis brevis N None None None None N N N Sl Decr   L. Flexor pollicis longus N None None None None N N N N   L. Extensor indicis proprius N None None None None N N N N   L. Flexor digitorum profundus (Ulnar) N None None None None N N N N   L. Brachioradialis N None None None None N N N N   L. Biceps brachii N None None None None N N N N   L. Extensor digitorum communis N None None None None N 1+ 1+ N   L. Triceps brachii Inc/DNT None None None None N 1+ 1+ Sl Decr   L. Deltoid N None None None None N N N N   L. Cervical paraspinals (low) N None None None None N N N N   L. Cervical paraspinals (mid) Inc/DNT None None None None N N N N         Nerve conduction studies in both arms disclosed the following abnormalities---moderate prolonged distal motor latencies in both median and ulnar nerves, with decreased motor conduction studies. The motor amplitude potentials in the right median and both ulnar nerves were decreased. The left median, both ulnar and both radial sensory responses were not recorded. There was profound delay in the right median distal sensory latency, with severely decreased antidromic and orthodromic sensory conductions. The F-wave latencies of both median and left ulnar nerves were prolonged; the right ulnar F-wave response was not obtained. Motor responses were not obtained from the left peroneal and tibial nerves. The left superficial and sural responses were not recorded. The H reflex in that leg was absent.     These findings were compared to the referential values in this laboratory, available upon request    Monopolar needle examination of the left arm found minimal distal denervation changes in the hand intrinsic as well as chronic denervation changes in the muscle supplied primarily by the C7 motor. Needle testing of the paraspinals produced acute denervation changes. Electrodiagnostic examination of both arms and the left leg disclosed evidence diagnostic of the following--    1. A diffuse, symmetrical sensorimotor peripheral neuropathy of the axonal degenerative type, of marked severity. 2.  The left cervical motor radiculopathy or intracanalicular lesion, proven by the abnormal needle testing of the paraspinals. There were no other peripheral neuropathies. There were no other motor radiculopathies or intracanalicular lesions. Sensory radiculopathies cannot be evaluated by electrodiagnostic. Clinically, the patient presented with longstanding renal failure and uremic neuropathy. She recently noted neck pains radiating into the left arm. I found no motor compromise in the left arm, but decreased sensation in that hand. Her difficulties are related to her longstanding relief of and left C7 radiculopathy. Imaging studies of the cervical spine may be in order. Clinical correlation was highly advised.

## 2021-09-28 ENCOUNTER — OFFICE VISIT (OUTPATIENT)
Dept: CARDIOLOGY CLINIC | Age: 66
End: 2021-09-28
Payer: COMMERCIAL

## 2021-09-28 VITALS
WEIGHT: 187.7 LBS | RESPIRATION RATE: 18 BRPM | SYSTOLIC BLOOD PRESSURE: 100 MMHG | HEIGHT: 70 IN | HEART RATE: 67 BPM | BODY MASS INDEX: 26.87 KG/M2 | DIASTOLIC BLOOD PRESSURE: 60 MMHG

## 2021-09-28 DIAGNOSIS — I10 ESSENTIAL HYPERTENSION: ICD-10-CM

## 2021-09-28 DIAGNOSIS — D15.1 PAPILLARY FIBROELASTOMA: Primary | ICD-10-CM

## 2021-09-28 DIAGNOSIS — I25.10 ATHEROSCLEROSIS OF NATIVE CORONARY ARTERY OF NATIVE HEART WITHOUT ANGINA PECTORIS: Primary | ICD-10-CM

## 2021-09-28 DIAGNOSIS — Z86.74 HX OF CARDIAC ARREST: ICD-10-CM

## 2021-09-28 DIAGNOSIS — I51.7 LEFT VENTRICULAR HYPERTROPHY: ICD-10-CM

## 2021-09-28 DIAGNOSIS — Z95.5 STENTED CORONARY ARTERY: ICD-10-CM

## 2021-09-28 DIAGNOSIS — D15.1 PAPILLARY FIBROELASTOMA: ICD-10-CM

## 2021-09-28 DIAGNOSIS — I51.9 LV DYSFUNCTION: ICD-10-CM

## 2021-09-28 PROCEDURE — 93000 ELECTROCARDIOGRAM COMPLETE: CPT | Performed by: INTERNAL MEDICINE

## 2021-09-28 PROCEDURE — 1036F TOBACCO NON-USER: CPT | Performed by: INTERNAL MEDICINE

## 2021-09-28 PROCEDURE — G8417 CALC BMI ABV UP PARAM F/U: HCPCS | Performed by: INTERNAL MEDICINE

## 2021-09-28 PROCEDURE — 4040F PNEUMOC VAC/ADMIN/RCVD: CPT | Performed by: INTERNAL MEDICINE

## 2021-09-28 PROCEDURE — 1090F PRES/ABSN URINE INCON ASSESS: CPT | Performed by: INTERNAL MEDICINE

## 2021-09-28 PROCEDURE — 3017F COLORECTAL CA SCREEN DOC REV: CPT | Performed by: INTERNAL MEDICINE

## 2021-09-28 PROCEDURE — 99214 OFFICE O/P EST MOD 30 MIN: CPT | Performed by: INTERNAL MEDICINE

## 2021-09-28 PROCEDURE — 1123F ACP DISCUSS/DSCN MKR DOCD: CPT | Performed by: INTERNAL MEDICINE

## 2021-09-28 PROCEDURE — G8400 PT W/DXA NO RESULTS DOC: HCPCS | Performed by: INTERNAL MEDICINE

## 2021-09-28 PROCEDURE — G8427 DOCREV CUR MEDS BY ELIG CLIN: HCPCS | Performed by: INTERNAL MEDICINE

## 2021-09-28 NOTE — PROGRESS NOTES
Patient Active Problem List   Diagnosis    Di[facility] retinopathy [facility]    Malignant neoplasm of right female breast ([address]    Atherosclerosis of native coronary artery of native heart without angina pectoris    Moderate obesity    Left ventricular hypertrophy    Herniated lumbar intervertebral disc    Lumbar degenerative disc disease    Pseudomeningocele    Lumbar radiculopathy    Lymphedema of arm    CKD (chronic kidney disease) stage 4, GFR 15-29 ml/min ([facility])    Insulin dependent type 2 diabetes mellitus ([facility])    Anemia of chronic disease    Chronic diastolic CHF (congestive heart failure) ([facility])    Neuropathy    Hypertension    Glaucoma    Refusal of blood product    Pancytopenia ([facility])    Controlled type 2 diabetes mellitus with chronic kidney disease on chronic dialysis, with long-term current use of insulin ([facility])    Vitreous hemorrhage ([address]    Patient is Hindu    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 ([address]    Hyperkalemia, diminished renal excretion    Chronic pain syndrome    Lumbar post-laminectomy syndrome    Myalgia    Cervicalgia    Diabetic peripheral neuropathy ([facility])    ESRD (end stage renal disease) ([address]    Bilateral carpal tunnel syndrome    Spinal stenosis of lumbar region with neurogenic claudication    Cardiac arrest ([address]    ESRD (end stage renal disease) on dialysis ([address]    Mixed hyperlipidemia    Lymphedema of right upper extremity    Coronary artery disease involving native coronary artery of native heart with angina pectoris ([facility])    Ventricular tachycardia ([facility])       Current Outpatient Medications   Medication Sig Dispense Refill    bumetanide (BUMEX) 2 MG tablet take 1 tablet by mouth 3 times a week on sunday, tuesday and thursday 60 tablet 0    lanthanum (FOSRENOL) 1000 MG chewable tablet CHEW AND SWALLOW ONE TABLET BY MOUTH THREE TIMES DAILY WITH MEALS      isosorbide mononitrate (IMDUR) 30 MG extended release tablet Take 1 tablet by mouth daily 30 tablet 1    allopurinol (ZYLOPRIM) 100 MG tablet Take 1 tablet by mouth daily 90 tablet 1    atorvastatin (LIPITOR) 40 MG tablet Take 1 tablet by mouth daily 90 tablet 1    metoprolol succinate (TOPROL XL) 25 MG extended release tablet Take 1 tablet by mouth daily 90 tablet 1    Insulin Pen Needle (ULTICARE MINI PEN NEEDLES) 31G X 6 MM MISC USE 1 PEN NEEDLE 4 times daily 100 each 5    blood glucose test strips (ASCENSIA AUTODISC VI;ONE TOUCH ULTRA TEST VI) strip 1 each by In Vitro route 2 times daily 200 each 5    ticagrelor (BRILINTA) 90 MG TABS tablet Take 1 tablet by mouth 2 times daily 180 tablet 1    gabapentin (NEURONTIN) 100 MG capsule Take 1 capsule by mouth as needed (with dialysis) for up to 30 days. Intended supply: 30 days 90 capsule 0    aspirin 81 MG EC tablet Take 1 tablet by mouth daily 30 tablet 3    hydrALAZINE (APRESOLINE) 10 MG tablet Take 1 tablet by mouth every 8 hours (Patient taking differently: Take 10 mg by mouth 2 times daily ) 270 tablet 1    midodrine (PROAMATINE) 5 MG tablet Take 1 tablet by mouth as needed (may repeat x1 durring HD for assymptomatic SBP<100) 90 tablet 0    B Complex-C-Folic Acid (BONI CAPS) 1 MG CAPS Take 1 mg by mouth daily      insulin glargine (LANTUS SOLOSTAR) 100 UNIT/ML injection pen Inject 5 Units into the skin nightly      insulin lispro, 1 Unit Dial, (HUMALOG KWIKPEN) 100 UNIT/ML SOPN Inject 0-4 Units into the skin 3 times daily      omeprazole (PRILOSEC) 20 MG delayed release capsule Take 20 mg by mouth nightly      LUMIGAN 0.01 % SOLN ophthalmic drops Place 1 drop into the right eye nightly       COMBIGAN 0.2-0.5 % ophthalmic solution Place 1 drop into the right eye every 12 hours   7     No current facility-administered medications for this visit. CC:    Patient is seen in follow up for:  1. Atherosclerosis of native coronary artery of native heart without angina pectoris    2.  LV dysfunction 3. Hx of cardiac arrest    4. Stented coronary artery    5. Essential hypertension    6. Left ventricular hypertrophy    7. Papillary fibroelastoma - mitral valve        HPI:  Patient is doing well without any specific cardiac problems. Patient denies any shortness of breath, chest pain, lightheadedness or dizziness. Patient is tolerating medications well without side effects. ROS:   General: No unusual weight gain, no change in exercise tolerance  Skin: No rash or itching  EENT: No vision changes or nosebleeds  Cardiovascular: No orthopnea or paroxysmal nocturnal dyspnea  Respiratory: No cough or hemoptysis  Gastrointestinal: No hematemesis or recent changes in bowel habits  Genitourinary: No hematuria, urgency or frequency  Musculoskeletal: No muscular weakness or joint swelling   Neurologic / Psychiatric: No incoordination or convulsions  Allergic / Immunologic/ Lymphatic / Endocrine: No anemia or bleeding tendency    Social History     Socioeconomic History    Marital status: Single     Spouse name: Not on file    Number of children: Not on file    Years of education: Not on file    Highest education level: Not on file   Occupational History    Not on file   Tobacco Use    Smoking status: Never Smoker    Smokeless tobacco: Never Used   Vaping Use    Vaping Use: Never used   Substance and Sexual Activity    Alcohol use: No    Drug use: No    Sexual activity: Not Currently   Other Topics Concern    Not on file   Social History Narrative    Not on file     Social Determinants of Health     Financial Resource Strain:     Difficulty of Paying Living Expenses:    Food Insecurity:     Worried About Running Out of Food in the Last Year:     [address] in the Last Year:    Transportation Needs:     Lack of Transportation (Medical):      Lack of Transportation (Non-Medical):    Physical Activity:     Days of Exercise per Week:     Minutes of Exercise per Session:    Stress:     Feeling of Stress :    Social Connections:     Frequency of Communication with Friends and Family:     Frequency of Social Gatherings with Friends and Family:     Attends [employer] Services:     Active Member of Clubs or Organizations:     Attends Club or Organization Meetings:     Marital Status:    Intimate Partner Violence:     Fear of Current or Ex-Partner:     Emotionally Abused:     Physically Abused:     Sexually Abused:        Family History   Problem Relation Age of Onset    Breast Cancer Mother 61    Hypertension Mother     Heart Disease Father     Prostate Cancer Father     Breast Cancer Maternal Grandmother 61       Past Medical History:   Diagnosis Date    Acute infection of bone ([address]     infection of rt foot, resolved.     Anemia of chronic disease     Breast cancer ([address]     right breast, 2008/ bladder, 2006- last chemotherapy \"years ago\"    CAD (coronary artery disease)     Carpal tunnel syndrome     bilat - for OR left hand 3-17-20     Chronic diastolic CHF (congestive heart failure) ([address] 09/23/2014 9/23/14- echocardiogram revealed moderate LV concentric hypertrophy, stage III diastolic dysfunction, mild tricuspid regurgitation    CKD (chronic kidney disease) stage 4, GFR 15-29 ml/min ([facility]     Diabetic retinopathy ([address]     Glaucoma     Hemodialysis patient ([address]     [facility]- Dr. Linda Rodas - left arm fistula     Hyperkalemia, diminished renal excretion 11/9/2017    Hypertension     Hypoglycemia unawareness in type 1 diabetes mellitus ([address] 11/7/2017    Insulin dependent type 2 diabetes mellitus ([address]     Neuropathy     feet    Osteomyelitis due to secondary diabetes ([address]     rt great toe with amputation    Patient is [employer]regational 11/7/2017    Refusal of blood product     patient states she dose not take blood transfusion    Ventricular hypertrophy     Vitreous hemorrhage ([address]     left eye       PHYSICAL EXAM:  CONSTITUTIONAL:  Well developed, well nourished    Vitals:    09/28/21 0835   BP: 100/60   Pulse: 67   Resp: 18   Weight: 187 lb 11.2 oz (85.1 kg)   Height: 5' 10\" (1.778 m)     HEAD & FACE: Normocephalic. Symmetric. EYES: No xanthelasma. Conjunctivae not injected. EARS, NOSE, MOUTH & THROAT: Good dentition. No oral pallor or cyanosis. NECK: No JVD at 30 degrees. No thyromegaly. RESPIRATORY: Clear to auscultation and percussion in all fields. No use of accessory muscle or intercostal retractions. CARDIOVASCULAR: Regular rate and rhythm. No lifts or thrills on palpitation. Auscultation with normal S1-S2 in intensity and splitting. No carotid bruits. Abdominal aorta not enlarged. Femoral arteries without bruits. Pedal pulses 2+. No edema. ABDOMEN: Soft without hepatic or splenic enlargement. No tenderness. MUSCULOSKELETAL: No kyphosis or scoliosis of the back. Good muscle strength and tone. No muscle atrophy. Slow gait and inability to undergo exercise stress testing. EXTREMITIES: No clubbing or cyanosis. SKIN: No Xanthomas or ulcerations. NEUROLOGIC: Oriented to time, place and person. Normal mood and affect. LYMPHATIC:  No palpable neck or supraclavicular nodes. No splenomegaly. EKG: the EKG tracing was reviewed and found to reveal: Normal sinus rhythm.  ms. No change compared to prior tracing. ASSESSMENT:                                                     ORDERS:       Diagnosis Orders   1. Atherosclerosis of native coronary artery of native heart without angina pectoris  EKG 12 Lead   2. LV dysfunction     3. Hx of cardiac arrest     4. Stented coronary artery     5. Essential hypertension     6. Left ventricular hypertrophy     7. Papillary fibroelastoma - mitral valve       Above assessment cardiac issues stable. PLAN:   See above orders. Medication reconciliation completed. Old records were reviewed and found to reveal: Last reported EF 30%>>>50.     Chest fibroblastoma in detail with patient. Referral to [facility] for opinion on probable fibroblastoma. Discussed issues that would prompt earlier evaluation. Same cardiac medications. Follow-up office visit in 6 months.

## 2021-11-03 NOTE — PROGRESS NOTES
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 PMH is significant for CAD, CKD, CHF, HTN, DM and has been followed by cardiology. 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 Patient denies any CP, SOB or dizziness. Allergies:    Allergies   Allergen Reactions    Lasix [Furosemide] Other (See Comments)     States her kidneys shut down       Home medications:    Current Outpatient Medications   Medication Sig Dispense Refill    bumetanide (BUMEX) 2 MG tablet take 1 tablet by mouth 3 times a week on sunday, tuesday and thursday 60 tablet 0    lanthanum (FOSRENOL) 1000 MG chewable tablet CHEW AND SWALLOW ONE TABLET BY MOUTH THREE TIMES DAILY WITH MEALS      isosorbide mononitrate (IMDUR) 30 MG extended release tablet Take 1 tablet by mouth daily 30 tablet 1    allopurinol (ZYLOPRIM) 100 MG tablet Take 1 tablet by mouth daily 90 tablet 1    atorvastatin (LIPITOR) 40 MG tablet Take 1 tablet by mouth daily 90 tablet 1    metoprolol succinate (TOPROL XL) 25 MG extended release tablet Take 1 tablet by mouth daily 90 tablet 1    Insulin Pen Needle (ULTICARE MINI PEN NEEDLES) 31G X 6 MM MISC USE 1 PEN NEEDLE 4 times daily 100 each 5    blood glucose test strips (ASCENSIA AUTODISC VI;ONE TOUCH ULTRA TEST VI) strip 1 each by In Vitro route 2 times daily 200 each 5    ticagrelor (BRILINTA) 90 MG TABS tablet Take 1 tablet by mouth 2 times daily 180 tablet 1    gabapentin (NEURONTIN) 100 MG capsule Take 1 capsule by mouth as needed (with dialysis) for up to 30 days. Intended supply: 30 days 90 capsule 0    aspirin 81 MG EC tablet Take 1 tablet by mouth daily 30 tablet 3    hydrALAZINE (APRESOLINE) 10 MG tablet Take 1 tablet by mouth every 8 hours (Patient taking differently: Take 10 mg by mouth 2 times daily ) 270 tablet 1    midodrine (PROAMATINE) 5 MG tablet Take 1 tablet by mouth as needed (may repeat x1 durring HD for assymptomatic SBP<100) 90 tablet 0    B Complex-C-Folic Acid (BONI CAPS) 1 MG CAPS Take 1 mg by mouth daily      insulin glargine (LANTUS SOLOSTAR) 100 UNIT/ML injection pen Inject 5 Units into the skin nightly      insulin lispro, 1 Unit Dial, (HUMALOG KWIKPEN) 100 UNIT/ML SOPN Inject 0-4 Units into the skin 3 times daily      omeprazole (PRILOSEC) 20 MG delayed release capsule Take 20 mg by mouth nightly      LUMIGAN 0.01 % SOLN ophthalmic drops Place 1 drop into the right eye nightly       COMBIGAN 0.2-0.5 % ophthalmic solution Place 1 drop into the right eye every 12 hours   7     No current facility-administered medications for this visit. Past Medical History:  Past Medical History:   Diagnosis Date    Acute infection of bone ([address]     infection of rt foot, resolved.     Anemia of chronic disease     Breast cancer ([address]     right breast, 2008/ bladder, 2006- last chemotherapy \"years ago\"    CAD (coronary artery disease)     Carpal tunnel syndrome     bilat - for OR left hand 3-17-20     Chronic diastolic CHF (congestive heart failure) ([address] 09/23/2014 9/23/14- echocardiogram revealed moderate LV concentric hypertrophy, stage III diastolic dysfunction, mild tricuspid regurgitation    CKD (chronic kidney disease) stage 4, GFR 15-29 ml/min ([facility])     Diabetic retinopathy ([address]     Glaucoma     Hemodialysis patient ([address]     [facility]  mon wed fri- Dr. Shannan Garcia - left arm fistula     Hyperkalemia, diminished renal excretion 11/9/2017    Hypertension     Hypoglycemia unawareness in type 1 diabetes mellitus ([address] 11/7/2017    Insulin dependent type 2 diabetes mellitus ([address]     Neuropathy     feet    Osteomyelitis due to secondary diabetes ([address]     rt great toe with amputation    Patient is Christian 11/7/2017    Refusal of blood product     patient states she dose not take blood transfusion    Ventricular hypertrophy     Vitreous hemorrhage ([address]     left eye       Past Surgical History:  Past Surgical History:   Procedure Laterality Date    AMPUTATION      right great toe    ANKLE SURGERY      correction on charcot joint of right ankle    CARPAL TUNNEL RELEASE Left 3/17/2020    LEFT CARPAL TUNNEL RELEASE performed by Cherry Snow MD at [address]      bilateral    CHOLECYSTECTOMY      COLONOSCOPY      CYSTOSCOPY      DIALYSIS FISTULA CREATION Left 01/31/2018    upper arm/Dr. Andria Mock    ECHO COMPL W DOP COLOR FLOW  2/14/2013         ECHO COMPLETE  9/17/2013         MASTECTOMY      right    OTHER SURGICAL HISTORY  9/27/2011    PPV, membranectomy, laser Right eye    OTHER SURGICAL HISTORY  insertion lumbar drain insertion    10/12/`14    OTHER SURGICAL HISTORY  10/22/15    percutaneous lead placement for spinal cord stimulator    OTHER SURGICAL HISTORY  11/03/2015    Spinal; cord stimulator- turned off as of 3-10-20     MO AV ANAST,UP ARM BASILIC VEIN TRANSPOSIT Left 5/15/2018    TRANSPOSITION STAGE II AV FISTULA - LEFT UPPER ARM performed by [facility] ANGIOACCESS AV FISTULA Left 9/25/2018    SUPERFICIALIZATION AV FISTULA - LEFT ARM performed by Sandhya Cook MD at [address] METH Left 4/10/2018    PARS PLANA VITRECTOMY 25 GAUGE RETINAL DETACHMENT REPAIR air fluid exchange, endolaser performed by Thalia Ortega MD at [address] TUNNELED VENOUS CATHETER PLACEMENT  11/15/2017    VITRECTOMY Left 04/10/2018    PARS PLANA VITRECTOMY; RETINAL DETACHMENT REPAIR; GAS BUBBLE; LASER LEFT EYE       Social History:  Social History     Socioeconomic History    Marital status: Single     Spouse name: Not on file    Number of children: Not on file    Years of education: Not on file    Highest education level: Not on file   Occupational History    Not on file   Tobacco Use    Smoking status: Never Smoker    Smokeless tobacco: Never Used   Vaping Use    Vaping Use: Never used   Substance and Sexual Activity    Alcohol use: No    Drug use: No    Sexual activity: Not Currently   Other Topics Concern    Not on file   Social History Narrative    Not on file     Social Determinants of Health     Financial Resource Strain:     Difficulty of Paying Living Expenses:    Food Insecurity:     Worried About Running Out of Food in the Last Year:     [address] in the Last Year:    Transportation Needs:     Lack of Transportation (Medical):  Lack of Transportation (Non-Medical):    Physical Activity:     Days of Exercise per Week:     Minutes of Exercise per Session:    Stress:     Feeling of Stress :    Social Connections:     Frequency of Communication with Friends and Family:     Frequency of Social Gatherings with Friends and Family:     Attends [employer] Services:     Active Member of Clubs or Organizations:     Attends Club or Organization Meetings:     Marital Status:    Intimate Partner Violence:     Fear of Current or Ex-Partner:     Emotionally Abused:     Physically Abused:     Sexually Abused:        Family History:  Family History   Problem Relation Age of Onset    Breast Cancer Mother 61    Hypertension Mother     Heart Disease Father     Prostate Cancer Father     Breast Cancer Maternal Grandmother 60       Review of Systems:  Constitutional: Denies fevers, chills, or weight loss. HEENT: Denies visual changes or hearing loss. Heart: As per HPI. Lungs: Denies shortness of breath, cough, or wheezing.    Gastrointestinal: Denies nausea, long-term current use of insulin (HCC)    [facility]re[facility] ([facility]    Patient is Gnosticist    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 (HCC)    Hyperkalemia, diminished renal excretion    Chronic pain syndrome    Lumbar post-laminectomy syndrome    Myalgia    Cervicalgia    Diabetic peripheral neuropathy (HCC)    ESRD (end stage renal disease) ([facility]    Bilateral carpal tunnel syndrome    Spinal stenosis of lumbar region with neurogenic claudication    Cardiac arrest ([facility]    ESRD (end stage renal disease) on dialysis ([facility]    Mixed hyperlipidemia    Lymphedema of right upper extremity    Coronary artery disease involving native coronary artery of native heart with angina pectoris ([facility]    Ventricular tachycardia [facility])       [address]                  [address] [place] [address]  [facility]                             CARDIAC CATHETERIZATION     PATIENT NAME: Bryn Cameron                    :        1955  MED REC NO:   [id number]                            ROOM:       0  ACCOUNT NO:   [de-identified]                           ADMIT DATE: 2021  PROVIDER:     Suly Catalan MD     DATE OF PROCEDURE:  2021     CARDIAC CATHETERIZATION AND ANGIOPLASTY REPORT     PROCEDURES:  Left heart catheterization, selective coronary angiography,  balloon angioplasty with deployment of drug-eluting coronary stent to  the mid LAD and balloon angioplasty with deployment of drug-eluting  coronary stent to the mid RCA with dilatation of the stent in the mid  RCA with a larger noncompliant balloon to high pressure.     Right femoral artery angiography and closure of the access site with  Angio-Seal device.     INDICATION:  Cardiac arrest.  Non-ST-elevation myocardial infarction. Severe LV dysfunction. High-risk stress test.     PRESSURES:  1. Aorta 115/53 with a mean of 76.  2.  Left ventricular systolic pressure 816.   Left ventricular  end-diastolic pressure 32.  3.  There was no significant gradient across the aortic valve.     CORONARY ANGIOGRAPHY:  LEFT MAIN:  The left main artery has an eccentric 20% luminal narrowing  in its middle segment.     LAD:  The left anterior descending artery is heavily calcified in its  proximal and middle segment. There is around 50% proximal LAD disease. After a trivial diagonal branch, the LAD has 95% stenosis. There,  however, was LATOSHA-3 flow in the LAD. The diagonal branch itself again  is a trivial vessel and a small caliber vessel and had around 90%  proximal disease.     LCX:  The left circumflex is a relatively small and nondominant vessel. The first marginal branch is a long, but small-caliber vessel which had  luminal irregularities, but without any significant angiographic luminal  narrowing. The left circumflex after the first marginal branch is  occluded. There was late opacification of a larger second marginal  branch. The occlusion of the left circumflex after the first marginal  branch is a chronic total occlusion.     RCA:  The right coronary artery is a large, dominant vessel. It is  heavily calcified along its course. There was mild ostial RCA disease. There was a diffusely diseased segment extending from the proximal to  the mid vessel with 90% mid vessel stenosis. The distal RCA at the  level of the crux had around 50% luminal narrowing. There was around  70% ostial narrowing of the posterior descending artery branch.     INTERVENTIONAL PROCEDURE:  EQUIPMENTS:  1.  6-Romanian KE5 Launcher guide catheter. 2.  0.014/300 ChoICE Extra Support J-exchange wire. 3.  2.5 mm x 15 mm [place] Monorail balloon. 4.  2.5 mm x 15 mm [place] Resolute drug-eluting coronary stent. 5.  6-Romanian JR4 Launcher Medtronic guide catheter with side holes. 6.  2.5 mm x 20 mm Monorail noncompliant balloon. 7.  2.5 mm x 26 mm Medtronic [place] Resolute drug-eluting coronary stent.   8.  2.75 mm x 20 mm Quantum [place] Monorail noncompliant balloon. 9.  5.5 GuideLiner catheter.     TECHNIQUE:  The procedure was done through right femoral artery approach  under ultrasound guidance. The patient was given 5000 units of  intravenous heparin prior to the interventional procedure. An ACT was  319 after giving the heparin and no more heparin was given during the  procedure. She was given 180 mg of crushed Brilinta to swallow at the  beginning of the interventional procedure. Intravenous fentanyl was  given for back pain.     Intervention on the LAD was first performed using the 6-Israeli JR4 guide  catheter. The extra support exchange wire was successfully advanced  through the mid LAD high-grade stenosis and further distally into the  vessel. The site of stenosis was dilated with a 2.5 mm x 15 mm balloon  inflated to 14 atmospheres. This was followed by advancing a 2.5 mm x  15 mm [place] Resolute drug-eluting coronary stent which was deployed at  the site at 14 atmospheres. Contrast injection after the deployment of  the stent revealed very good results without any significant residual  stenosis with no evidence of dissections or flaps and with LATOSHA-3 flow  in the vessel.     Intervention on the right coronary artery was then performed. The right  coronary artery was selectively engaged with 6-Israeli JR4 guide catheter  with side holes. The same wire was used to cross the high-grade disease  in the mid RCA and the wire was advanced further distally into the  posterolateral branch. The 2.5 mm x 15 mm compliant balloon was then  advanced to the site of mid RCA stenosis and inflating the balloon to 14  atmospheres resulted in the rupture of the balloon. This was exchanged  to a 2.5 mm x 15 mm noncompliant balloon which was inflated at the site  to 16 atmospheres.   This was followed by advancing a 2.5 mm x 26 mm [place]  Resolute drug-eluting coronary stent through a GuideLiner to the  intended position and this stent was deployed at 15 atmospheres. This  stent was then postdilated with a 2.75 mm x 20 mm noncompliant balloon  inflated along the length of the stent to 18 atmospheres. Contrast  injection at the end of the intervention revealed good results in the  RCA with around 20% residual stenosis within the middle segment of the  stent with no evidence of dissections or flaps and with LATOSHA-3 flow in  the vessel.     CONCLUSION:  1. Coronary artery disease:  a. Left main:  20% eccentric mid vessel narrowing.  b.  LAD:  50% proximal vessel narrowing and 95% mid vessel stenosis. Trivial diagonal branch with 90% stenosis. c.  LCX:  Occluded after a small caliber first marginal branch which is  a chronic total occlusion. The second larger marginal branch filled  late. d.  RCA:  Large, dominant vessel with 90% heavily calcified mid vessel  stenosis. 50% disease at the level of the crux and 70% ostial stenosis  of the posterior descending artery branch. 2.  Markedly elevated left ventricular end-diastolic pressure. 3.  Successful balloon angioplasty with deployment of drug-eluting  coronary stent to the mid LAD with very good results.   4.  Successful balloon angioplasty with deployment of drug-eluting  coronary stent to the mid RCA with poststent deployment dilatation with  a larger high-pressure noncompliant balloon with good results.     Transthoracic Echocardiography Report (TTE)      Demographics      Patient Name        Georg Boas Gender               Female      Medical Record      [id number]        Room Number   Number      Account #           [de-identified]       Procedure Date       08/24/2021      Corporate ID                        Ordering Physician   Iraj Wade MD      Accession Number    [id number]      Referring Physician  Iraj Wade MD      Date of Birth       1955      Sonographer          Cliff Rick      Age                 73 year(s)      Interpreting         Iraj Wade MD Physician                                          Any Other     Procedure     Type of Study      TTE procedure:Echo Limited Study. Procedure Date  Date: 08/24/2021 Start: 07:59 AM     Study Location: Echo Lab  Technical Quality: Adequate visualization     Indications:LV function.     Patient Status: Routine     Height: 70 inches Weight: 187 pounds BSA: 2.03 m^2 BMI: 26.83 kg/m^2     BP: 122/70 mmHg     Allergies    - Other drug:(Lasix).      Findings      Left Ventricle   Left ventricular size is grossly normal.   Ejection fraction is visually estimated at 50-55%. Biplane EF 58%   Mild left ventricular concentric hypertrophy noted. No regional wall motion abnormalities seen. Left Atrium   The left atrium is mildly to moderately dilated. Mitral Valve   Moderate mitral annular calcification. 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 RVSP is 40 mmHg. Pericardial Effusion   There is a trivial pericardial effusion noted. Conclusions      Summary   Limited study ordered. Left ventricular size is grossly normal.   Ejection fraction is visually estimated at 50-55%. Biplane EF 58%   Mild left ventricular concentric hypertrophy noted. No regional wall motion abnormalities seen.    Papillary fibroelastoma suggested (0.6 cmx0.8 cm)        Plan:   KIARA to better evaluate mitral valve anatomy  STS risk stratify  Follow-up in clinic after KIARA for further discussion

## 2021-11-04 ENCOUNTER — OFFICE VISIT (OUTPATIENT)
Dept: CARDIOTHORACIC SURGERY | Age: 66
End: 2021-11-04
Payer: COMMERCIAL

## 2021-11-04 DIAGNOSIS — I05.8 MITRAL VALVE MASS: Primary | ICD-10-CM

## 2021-11-04 PROCEDURE — 1090F PRES/ABSN URINE INCON ASSESS: CPT | Performed by: STUDENT IN AN ORGANIZED HEALTH CARE EDUCATION/TRAINING PROGRAM

## 2021-11-04 PROCEDURE — 1123F ACP DISCUSS/DSCN MKR DOCD: CPT | Performed by: STUDENT IN AN ORGANIZED HEALTH CARE EDUCATION/TRAINING PROGRAM

## 2021-11-04 PROCEDURE — 4040F PNEUMOC VAC/ADMIN/RCVD: CPT | Performed by: STUDENT IN AN ORGANIZED HEALTH CARE EDUCATION/TRAINING PROGRAM

## 2021-11-04 PROCEDURE — G8417 CALC BMI ABV UP PARAM F/U: HCPCS | Performed by: STUDENT IN AN ORGANIZED HEALTH CARE EDUCATION/TRAINING PROGRAM

## 2021-11-04 PROCEDURE — G8400 PT W/DXA NO RESULTS DOC: HCPCS | Performed by: STUDENT IN AN ORGANIZED HEALTH CARE EDUCATION/TRAINING PROGRAM

## 2021-11-04 PROCEDURE — 1036F TOBACCO NON-USER: CPT | Performed by: STUDENT IN AN ORGANIZED HEALTH CARE EDUCATION/TRAINING PROGRAM

## 2021-11-04 PROCEDURE — G8484 FLU IMMUNIZE NO ADMIN: HCPCS | Performed by: STUDENT IN AN ORGANIZED HEALTH CARE EDUCATION/TRAINING PROGRAM

## 2021-11-04 PROCEDURE — G8428 CUR MEDS NOT DOCUMENT: HCPCS | Performed by: STUDENT IN AN ORGANIZED HEALTH CARE EDUCATION/TRAINING PROGRAM

## 2021-11-04 PROCEDURE — 3017F COLORECTAL CA SCREEN DOC REV: CPT | Performed by: STUDENT IN AN ORGANIZED HEALTH CARE EDUCATION/TRAINING PROGRAM

## 2021-11-04 PROCEDURE — 99204 OFFICE O/P NEW MOD 45 MIN: CPT | Performed by: STUDENT IN AN ORGANIZED HEALTH CARE EDUCATION/TRAINING PROGRAM

## 2021-11-09 RX ORDER — SODIUM CHLORIDE 0.9 % (FLUSH) 0.9 %
5-40 SYRINGE (ML) INJECTION EVERY 12 HOURS SCHEDULED
Status: CANCELLED | OUTPATIENT
Start: 2021-11-09

## 2021-11-09 RX ORDER — SODIUM CHLORIDE 0.9 % (FLUSH) 0.9 %
5-40 SYRINGE (ML) INJECTION PRN
Status: CANCELLED | OUTPATIENT
Start: 2021-11-09

## 2021-11-09 RX ORDER — SODIUM CHLORIDE 9 MG/ML
25 INJECTION, SOLUTION INTRAVENOUS PRN
Status: CANCELLED | OUTPATIENT
Start: 2021-11-09

## 2021-11-09 RX ORDER — SODIUM CHLORIDE 9 MG/ML
50 INJECTION, SOLUTION INTRAVENOUS CONTINUOUS
Status: CANCELLED | OUTPATIENT
Start: 2021-11-09

## 2021-11-10 ENCOUNTER — TELEPHONE (OUTPATIENT)
Dept: NON INVASIVE DIAGNOSTICS | Age: 66
End: 2021-11-10

## 2021-11-10 NOTE — TELEPHONE ENCOUNTER
Reminded patient of scheduled procedure via voicemail on 11/11/21. Instructions given and COVID questionnaire completed.

## 2021-11-11 ENCOUNTER — ANESTHESIA (OUTPATIENT)
Dept: CARDIAC CATH/INVASIVE PROCEDURES | Age: 66
End: 2021-11-11

## 2021-11-11 ENCOUNTER — HOSPITAL ENCOUNTER (OUTPATIENT)
Dept: CARDIAC CATH/INVASIVE PROCEDURES | Age: 66
Discharge: HOME | End: 2021-11-11
Payer: COMMERCIAL

## 2021-11-11 ENCOUNTER — ANESTHESIA EVENT (OUTPATIENT)
Dept: CARDIAC CATH/INVASIVE PROCEDURES | Age: 66
End: 2021-11-11

## 2021-11-11 VITALS
BODY MASS INDEX: 26.2 KG/M2 | WEIGHT: 183 LBS | TEMPERATURE: 98.3 F | DIASTOLIC BLOOD PRESSURE: 76 MMHG | RESPIRATION RATE: 16 BRPM | SYSTOLIC BLOOD PRESSURE: 126 MMHG | HEIGHT: 70 IN | OXYGEN SATURATION: 94 % | HEART RATE: 61 BPM

## 2021-11-11 VITALS
RESPIRATION RATE: 18 BRPM | OXYGEN SATURATION: 100 % | DIASTOLIC BLOOD PRESSURE: 50 MMHG | SYSTOLIC BLOOD PRESSURE: 111 MMHG

## 2021-11-11 DIAGNOSIS — I05.9 MITRAL VALVE DISEASE: ICD-10-CM

## 2021-11-11 PROCEDURE — 93312 ECHO TRANSESOPHAGEAL: CPT

## 2021-11-11 PROCEDURE — 2709999900 HC NON-CHARGEABLE SUPPLY

## 2021-11-11 PROCEDURE — 93325 DOPPLER ECHO COLOR FLOW MAPG: CPT

## 2021-11-11 PROCEDURE — 3700000001 HC ADD 15 MINUTES (ANESTHESIA)

## 2021-11-11 PROCEDURE — 3700000000 HC ANESTHESIA ATTENDED CARE

## 2021-11-11 PROCEDURE — 2580000003 HC RX 258: Performed by: INTERNAL MEDICINE

## 2021-11-11 PROCEDURE — 6360000002 HC RX W HCPCS

## 2021-11-11 PROCEDURE — 93321 DOPPLER ECHO F-UP/LMTD STD: CPT

## 2021-11-11 RX ORDER — PROPOFOL 10 MG/ML
INJECTION, EMULSION INTRAVENOUS PRN
Status: DISCONTINUED | OUTPATIENT
Start: 2021-11-11 | End: 2021-11-11 | Stop reason: SDUPTHER

## 2021-11-11 RX ORDER — SODIUM CHLORIDE 9 MG/ML
INJECTION, SOLUTION INTRAVENOUS ONCE
Status: COMPLETED | OUTPATIENT
Start: 2021-11-11 | End: 2021-11-11

## 2021-11-11 RX ADMIN — SODIUM CHLORIDE: 9 INJECTION, SOLUTION INTRAVENOUS at 11:19

## 2021-11-11 RX ADMIN — PROPOFOL 220 MG: 10 INJECTION, EMULSION INTRAVENOUS at 11:32

## 2021-11-11 ASSESSMENT — LIFESTYLE VARIABLES: SMOKING_STATUS: 0

## 2021-11-11 ASSESSMENT — ENCOUNTER SYMPTOMS: DYSPNEA ACTIVITY LEVEL: AFTER AMBULATING 1 FLIGHT OF STAIRS

## 2021-11-11 NOTE — ANESTHESIA PRE PROCEDURE
Department of Anesthesiology  Preprocedure Note       Name:  Destiney Rees   Age:  73 y.o.  :  1955                                          MRN:  [id number]         Date:  2021      Surgeon: * No surgeons listed *    Procedure: * No procedures listed *    Medications prior to admission:   Prior to Admission medications    Medication Sig Start Date End Date Taking?  Authorizing Provider   bumetanide (BUMEX) 2 MG tablet take 1 tablet by mouth 3 times a week on , tuesday and 21  Yes Grey Montes De Oca MD   lanthanum (FOSRENOL) 1000 MG chewable tablet CHEW AND SWALLOW ONE TABLET BY MOUTH THREE TIMES DAILY WITH MEALS 21  Yes Historical Provider, MD   isosorbide mononitrate (IMDUR) 30 MG extended release tablet Take 1 tablet by mouth daily 21  Yes Grey Montes De Oca MD   allopurinol (ZYLOPRIM) 100 MG tablet Take 1 tablet by mouth daily 21  Yes Grey Montes De Oca MD   atorvastatin (LIPITOR) 40 MG tablet Take 1 tablet by mouth daily 21  Yes Grey Montes De Oca MD   metoprolol succinate (TOPROL XL) 25 MG extended release tablet Take 1 tablet by mouth daily 21  Yes Grey Montes De Oca MD   Insulin Pen Needle (ULTICARE MINI PEN NEEDLES) 31G X 6 MM MISC USE 1 PEN NEEDLE 4 times daily 21  Yes Grey Montes De Oca MD   blood glucose test strips (ASCENSIA AUTODISC VI;ONE TOUCH ULTRA TEST VI) strip 1 each by In Vitro route 2 times daily 21  Yes Grey Montes De Oca MD   ticagrelor (BRILINTA) 90 MG TABS tablet Take 1 tablet by mouth 2 times daily 21  Yes Grey Montes De Oca MD   aspirin 81 MG EC tablet Take 1 tablet by mouth daily 21  Yes Eriberto Gonzales MD   hydrALAZINE (APRESOLINE) 10 MG tablet Take 1 tablet by mouth every 8 hours  Patient taking differently: Take 10 mg by mouth 2 times daily  21  Yes Eriberto Gonzales MD   midodrine (PROAMATINE) 5 MG tablet Take 1 tablet by mouth as needed (may repeat x1 durring HD for assymptomatic SBP<100) 21  Yes Irma Amour Saskia Patino MD   B Complex-C-Folic Acid (BONI CAPS) 1 MG CAPS Take 1 mg by mouth daily   Yes Historical Provider, MD   insulin glargine (LANTUS SOLOSTAR) 100 UNIT/ML injection pen Inject 5 Units into the skin nightly   Yes Historical Provider, MD   insulin lispro, 1 Unit Dial, (HUMALOG KWIKPEN) 100 UNIT/ML SOPN Inject 0-4 Units into the skin 3 times daily   Yes Historical Provider, MD MCHUGH 0.01 % SOLN ophthalmic drops Place 1 drop into the right eye nightly  6/9/20  Yes Historical Provider, MD   COMBIGAN 0.2-0.5 % ophthalmic solution Place 1 drop into the right eye every 12 hours  8/1/19  Yes Historical Provider, MD   gabapentin (NEURONTIN) 100 MG capsule Take 1 capsule by mouth as needed (with dialysis) for up to 30 days.  Intended supply: 30 days 9/7/21 11/4/21  Grey Montes De Oca MD   omeprazole (PRILOSEC) 20 MG delayed release capsule Take 20 mg by mouth nightly    Historical Provider, MD       Current medications:    Current Outpatient Medications   Medication Sig Dispense Refill    bumetanide (BUMEX) 2 MG tablet take 1 tablet by mouth 3 times a week on sunday, tuesday and thursday 60 tablet 0    lanthanum (FOSRENOL) 1000 MG chewable tablet CHEW AND SWALLOW ONE TABLET BY MOUTH THREE TIMES DAILY WITH MEALS      isosorbide mononitrate (IMDUR) 30 MG extended release tablet Take 1 tablet by mouth daily 30 tablet 1    allopurinol (ZYLOPRIM) 100 MG tablet Take 1 tablet by mouth daily 90 tablet 1    atorvastatin (LIPITOR) 40 MG tablet Take 1 tablet by mouth daily 90 tablet 1    metoprolol succinate (TOPROL XL) 25 MG extended release tablet Take 1 tablet by mouth daily 90 tablet 1    Insulin Pen Needle (ULTICARE MINI PEN NEEDLES) 31G X 6 MM MISC USE 1 PEN NEEDLE 4 times daily 100 each 5    blood glucose test strips (ASCENSIA AUTODISC VI;ONE TOUCH ULTRA TEST VI) strip 1 each by In Vitro route 2 times daily 200 each 5    ticagrelor (BRILINTA) 90 MG TABS tablet Take 1 tablet by mouth 2 times daily 180 tablet 1    aspirin 81 MG EC tablet Take 1 tablet by mouth daily 30 tablet 3    hydrALAZINE (APRESOLINE) 10 MG tablet Take 1 tablet by mouth every 8 hours (Patient taking differently: Take 10 mg by mouth 2 times daily ) 270 tablet 1    midodrine (PROAMATINE) 5 MG tablet Take 1 tablet by mouth as needed (may repeat x1 durring HD for assymptomatic SBP<100) 90 tablet 0    B Complex-C-Folic Acid (BONI CAPS) 1 MG CAPS Take 1 mg by mouth daily      insulin glargine (LANTUS SOLOSTAR) 100 UNIT/ML injection pen Inject 5 Units into the skin nightly      insulin lispro, 1 Unit Dial, (HUMALOG KWIKPEN) 100 UNIT/ML SOPN Inject 0-4 Units into the skin 3 times daily      LUMIGAN 0.01 % SOLN ophthalmic drops Place 1 drop into the right eye nightly       COMBIGAN 0.2-0.5 % ophthalmic solution Place 1 drop into the right eye every 12 hours   7    gabapentin (NEURONTIN) 100 MG capsule Take 1 capsule by mouth as needed (with dialysis) for up to 30 days. Intended supply: 30 days 90 capsule 0    omeprazole (PRILOSEC) 20 MG delayed release capsule Take 20 mg by mouth nightly       Current Facility-Administered Medications   Medication Dose Route Frequency Provider Last Rate Last Admin    0.9 % sodium chloride infusion   IntraVENous Once Roddy French MD           Allergies:     Allergies   Allergen Reactions    Lasix [Furosemide] Other (See Comments)     States her kidneys shut down       Problem List:    Patient Active Problem List   Diagnosis Code    Diabetic retinopathy ([facility] E11.319    Malignant neoplasm of right female breast ([facility] C50.911    Atherosclerosis of native coronary artery of native heart without angina pectoris I25.10    Moderate obesity E66.8    Left ventricular hypertrophy I51.7    Herniated lumbar intervertebral disc M51.26    Lumbar degenerative disc disease M51.36    Pseudomeningocele G96.198    Lumbar radiculopathy M54.16    Lymphedema of arm I89.0    CKD (chronic kidney disease) stage 4, GFR 15-29 ml/min ([facility] N18.4    Insulin dependent type 2 diabetes mellitus ([facility] E11.9, Z79.4    Anemia of chronic disease D63.8    Chronic diastolic CHF (congestive heart failure) ([facility]) I50.32    Neuropathy G62.9    Hypertension I10    Glaucoma H40.9    Refusal of blood product Z78.9    Pancytopenia ([facility]) D61.818    Controlled type 2 diabetes mellitus with chronic kidney disease on chronic dialysis, with long-term current use of insulin ([facility]) E11.22, N18.6, Z79.4, Z99.2    Vitreous hemorrhage ([facility] H43.10    Patient is Presybeterian Z78.9    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 ([facility] E11.649    Hyperkalemia, diminished renal excretion E87.5    Chronic pain syndrome G89.4    Lumbar post-laminectomy syndrome M96.1    Myalgia M79.10    Cervicalgia M54.2    Diabetic peripheral neuropathy ([facility]) E11.42    ESRD (end stage renal disease) ([facility]) N18.6    Bilateral carpal tunnel syndrome G56.03    Spinal stenosis of lumbar region with neurogenic claudication M48.062    Cardiac arrest ([facility]) I46.9    ESRD (end stage renal disease) on dialysis ([facility]) N18.6, Z99.2    Mixed hyperlipidemia E78.2    Lymphedema of right upper extremity I89.0    Coronary artery disease involving native coronary artery of native heart with angina pectoris ([facility]) I25.119    Ventricular tachycardia ([facility]) I47.2       Past Medical History:        Diagnosis Date    Acute infection of bone ([facility])     infection of rt foot, resolved.     Anemia of chronic disease     Breast cancer ([facility]     right breast, 2008/ bladder, 2006- last chemotherapy \"years ago\"    CAD (coronary artery disease)     Carpal tunnel syndrome     bilat - for OR left hand 3-17-20     Chronic diastolic CHF (congestive heart failure) ([facility] 09/23/2014 9/23/14- echocardiogram revealed moderate LV concentric hypertrophy, stage III diastolic dysfunction, mild tricuspid regurgitation    CKD (chronic kidney disease) stage 4, GFR 15-29 ml/min ([facility])  Diabetic retinopathy ([address]     Glaucoma     Hemodialysis patient ([address]     [facility] wed fri- Dr. Mandy Lopes - left arm fistula     Hyperkalemia, diminished renal excretion 11/9/2017    Hypertension     Hypoglycemia unawareness in type 1 diabetes mellitus ([address] 11/7/2017    Insulin dependent type 2 diabetes mellitus ([address]     Neuropathy     feet    Osteomyelitis due to secondary diabetes ([address]     rt great toe with amputation    Patient is Denominational 11/7/2017    Refusal of blood product     patient states she dose not take blood transfusion    Ventricular hypertrophy     Vitreous hemorrhage ([address]     left eye       Past Surgical History:        Procedure Laterality Date    AMPUTATION      right great toe    ANKLE SURGERY      correction on charcot joint of right ankle    CARPAL TUNNEL RELEASE Left 3/17/2020    LEFT CARPAL TUNNEL RELEASE performed by Abril Miramontes MD at [address]      bilateral    CHOLECYSTECTOMY      COLONOSCOPY      CYSTOSCOPY      DIALYSIS FISTULA CREATION Left 01/31/2018    upper arm/Dr. Bay Becker    ECHO COMPL W DOP COLOR FLOW  2/14/2013         ECHO COMPLETE  9/17/2013         MASTECTOMY      right    OTHER SURGICAL HISTORY  9/27/2011    PPV, membranectomy, laser Right eye    OTHER SURGICAL HISTORY  insertion lumbar drain insertion    10/12/`14    OTHER SURGICAL HISTORY  10/22/15    percutaneous lead placement for spinal cord stimulator    OTHER SURGICAL HISTORY  11/03/2015    Spinal; cord stimulator- turned off as of 3-10-20     CO AV ANAST,UP ARM BASILIC VEIN TRANSPOSIT Left 5/15/2018    TRANSPOSITION STAGE II AV FISTULA - LEFT UPPER ARM performed by Jordyn Alvarez MD at [facility] ANGIOACCESS AV FISTULA Left 9/25/2018    SUPERFICIALIZATION AV FISTULA - LEFT ARM performed by Jordyn Alvarez MD at [facility] W/VITRECTOMY ANY METH Left 4/10/2018    PARS PLANA VITRECTOMY 25 GAUGE RETINAL DETACHMENT REPAIR air fluid exchange, endolaser performed by Kailee Morel MD at [address] [place] [address] Drive TUNNELED VENOUS CATHETER PLACEMENT  11/15/2017    VITRECTOMY Left 04/10/2018    PARS PLANA VITRECTOMY; RETINAL DETACHMENT REPAIR; GAS BUBBLE; LASER LEFT EYE       Social History:    Social History     Tobacco Use    Smoking status: Never Smoker    Smokeless tobacco: Never Used   Substance Use Topics    Alcohol use: No                                Counseling given: Not Answered      Vital Signs (Current):   Vitals:    11/11/21 0927   BP: 126/76   Pulse: 61   Resp: 16   Temp: 36.8 °C (98.3 °F)   SpO2: 94%   Weight: 183 lb (83 kg)   Height: 5' 10\" (1.778 m)                                              BP Readings from Last 3 Encounters:   11/11/21 126/76   11/04/21 (!) 150/57   09/28/21 100/60       NPO Status:    Last solid consumption before 2359 on 11/10/21  Sips with meds at 0600 on 11/11/21                                                                             BMI:   Wt Readings from Last 3 Encounters:   11/11/21 183 lb (83 kg)   11/04/21 187 lb (84.8 kg)   09/28/21 187 lb 11.2 oz (85.1 kg)     Body mass index is 26.26 kg/m². CBC:   Lab Results   Component Value Date    WBC 6.5 05/25/2021    RBC 3.36 05/25/2021    HGB 9.2 05/25/2021    HCT 29.7 05/25/2021    MCV 88.4 05/25/2021    RDW 16.9 05/25/2021     05/25/2021       CMP:   Lab Results   Component Value Date     05/25/2021    K 3.8 05/25/2021    K 3.9 05/19/2021    CL 99 05/25/2021    CO2 29 05/25/2021    BUN 27 05/25/2021    CREATININE 4.4 05/25/2021    GFRAA 12 05/25/2021    LABGLOM 12 05/25/2021    GLUCOSE 136 05/25/2021    GLUCOSE 46 04/02/2012    PROT 5.9 05/25/2021    CALCIUM 8.4 05/25/2021    BILITOT 0.5 05/25/2021    ALKPHOS 85 05/25/2021    AST 17 05/25/2021    ALT 38 05/25/2021       POC Tests: No results for input(s): POCGLU, POCNA, POCK, POCCL, POCBUN, POCHEMO, POCHCT in the last 72 hours.     Coags: Lab Results   Component Value Date    PROTIME 11.5 05/19/2021    PROTIME 10.4 02/15/2012    INR 1.1 05/19/2021    APTT 92.9 05/21/2021       HCG (If Applicable): No results found for: PREGTESTUR, PREGSERUM, HCG, HCGQUANT     ABGs: No results found for: PHART, PO2ART, QII7DZN, MKE6BED, BEART, Q2UDLCAC     Type & Screen (If Applicable):  Lab Results   Component Value Date    LABABO O 08/30/2011    [address] POS 08/30/2011       Drug/Infectious Status (If Applicable):  No results found for: HIV, HEPCAB    COVID-19 Screening (If Applicable):   Lab Results   Component Value Date    COVID19 Not Detected 05/19/2021               CXR 5/19/21  FINDINGS:   The lungs are without acute focal process.  There is no effusion or   pneumothorax.  Cardiomegaly.  The osseous structures are without acute   process.  Right-sided port a catheter tip in the SVC.           Impression   No acute process.       Cardiomegaly.                     CARDIAC STRESS TEST 5/20/21  FINDINGS: The overall quality of the study was adequate.       Left ventricular cavity size was noted to be dilated during stress and   rest images       Rotational analog analysis demonstrated show no significant motion   artifact       The gated SPECT stress imaging in the short, vertical long, and   horizontal long axis demonstrated severe defect was present in the   apical wall(s) that was large sized by quantification.              There also was a severe defect present in the septal wall(s) that was   large sized by quantification.       There is also mild defect present in the small inferior apical that   are small in size       There is also a mild defect present in the anterior septal wall that   was small in size       The resting images partially reduced. The anterolateral wall only.       Gated SPECT left ventricular ejection fraction was calculated to be   25%, with apical and septal akinesis, moderate global hypokinesis.            Impression       The myocardial perfusion imaging was abnormal       The abnormality was a large fixed defect in the apical and septal   walls; small fixed defect in the inferoapical wall; partially   reversible defect in the small area of anterolateral wall.       Overall left ventricular systolic function was severely reduced, EF   25% with apical septal akinesis, moderate global hypokinesis. Dilated   left ventricle during stress and rest.       High risk myocardial perfusion study       Compared to previous study from August 2011 which showed large lateral   wall ischemia with EF 66%.                     CARDIAC CATH 5/21/21  CONCLUSION:  1. Coronary artery disease:  a. Left main:  20% eccentric mid vessel narrowing.  b.  LAD:  50% proximal vessel narrowing and 95% mid vessel stenosis. Trivial diagonal branch with 90% stenosis. c.  LCX:  Occluded after a small caliber first marginal branch which is  a chronic total occlusion. The second larger marginal branch filled  late. d.  RCA:  Large, dominant vessel with 90% heavily calcified mid vessel  stenosis. 50% disease at the level of the crux and 70% ostial stenosis  of the posterior descending artery branch. 2.  Markedly elevated left ventricular end-diastolic pressure. 3.  Successful balloon angioplasty with deployment of drug-eluting  coronary stent to the mid LAD with very good results. 4.  Successful balloon angioplasty with deployment of drug-eluting  coronary stent to the mid RCA with poststent deployment dilatation with  a larger high-pressure noncompliant balloon with good results.               EKG 9/28/21  Sinus  Rhythm   -Poor R-wave progression -nonspecific -consider old anterior infarct. -  Diffuse nonspecific T-abnormality. ABNORMAL                 ECHO 8/24/21 EF 50-55%   Findings      Left Ventricle   Left ventricular size is grossly normal.   Ejection fraction is visually estimated at 50-55%.  Biplane EF 58%   Mild left ventricular concentric hypertrophy noted. No regional wall motion abnormalities seen. Left Atrium   The left atrium is mildly to moderately dilated. Mitral Valve   Moderate mitral annular calcification. 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 RVSP is 40 mmHg. Pericardial Effusion   There is a trivial pericardial effusion noted. Conclusions      Summary   Limited study ordered. Left ventricular size is grossly normal.   Ejection fraction is visually estimated at 50-55%. Biplane EF 58%   Mild left ventricular concentric hypertrophy noted. No regional wall motion abnormalities seen. 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 Multiple missing and chipped teeth. Denies loose teeth. Pulmonary:   (+) decreased breath sounds,      (-) not a current smoker          Patient did not smoke on day of surgery.                  Cardiovascular:  Exercise tolerance: poor (<4 METS),   (+) hypertension:, valvular problems/murmurs (papillary fibroelastoma of mitral valve):, CAD:, CABG/stent (cardiac arrest 5/2021 s/p stent x1):, dysrhythmias: ventricular tachycardia, CHF:, FRENCH: after ambulating 1 flight of stairs,     (-)  angina (last c/p in 5/2021)    ECG reviewed  Rhythm: regular  Rate: normal  Echocardiogram reviewed  Stress test reviewed  Cleared by cardiology     Beta Blocker:  Dose within 24 Hrs         Neuro/Psych:   (+) neuromuscular disease (diabetic retinopathy, vitreous hemorrhage of left eye, glaucoma, herniated lumbar intervertebral disc, DDD, pseudomeningocele, chronic pain, myalgia):,             GI/Hepatic/Renal:   (+) renal disease (last IHD 11/10/21): ESRD and dialysis, Endo/Other:    (+) Diabetes (right great toe amputation d/t osteomyelitis)Type II DM, poorly controlled, using insulin, blood dyscrasia: anemia:., malignancy/cancer (right breast CA 2008). Abdominal:             Vascular: negative vascular ROS. Other Findings:           Anesthesia Plan      MAC     ASA 4       Induction: intravenous. Anesthetic plan and risks discussed with patient. Use of blood products discussed with whom did not consent to blood products. Special considerations: [employer]. Plan discussed with CRNA and attending. Raheel Anglin RN   11/11/2021      Patient seen and examined, chart reviewed, agree with above findings. Anesthetic plan, risks, benefits, alternatives, and personnel involved discussed with patient. Patient verbalized an understanding and agreed to proceed. NPO status confirmed. Anesthetic plan discussed with care team members and agreed upon.     Wyatt Lacey DO   11/11/2021  11:40 AM

## 2021-11-11 NOTE — PROCEDURES
PRELIMINARY TRANSESOPHAGEAL ECHOCARDIOGRAPHY REPORT    Indications for study:  Mitral leaflet mass    Study performed using (Sedation): Per anesthesia    Complications: None    Preliminary findings: Normal LV function, normal RV function, no hemodynamically significant valve disease(mobile posterior leaflet echodensity with leaflet restriction and mild MR), no evidence of vegetations, no left atrial or left atrial appendage thrombus (no evidence of spontaneous echo contrast), no intraatrial shunting on bubble study, no significant atherosclerosis of the aorta. For patient status during procedure, refer to intra-procedure sedation flowsheet. The complete KIARA report will follow - see \"CARDIOLOGY\" Section in [address] [place] [address] Rd. Shanda Adjutant.  Janell Goodman, [address] [place] [address] Cardiology

## 2021-11-13 NOTE — ANESTHESIA POSTPROCEDURE EVALUATION
Department of Anesthesiology  Postprocedure Note    Patient: Bairon Motley  MRN: [id number]  YOB: 1955  Date of evaluation: 11/13/2021  Time:  6:34 PM     Procedure Summary     Date: 11/11/21 Room / Location: [facility] CATH LAB; [facility] ECHO    Anesthesia Start: 5452 Anesthesia Stop: 3212    Procedure: SEY KIARA Diagnosis: Essential (primary) hypertension    Scheduled Providers: Wyatt Lacey DO; GOYO Cobos CRNA Responsible Provider: Wyatt Lacey DO    Anesthesia Type: MAC ASA Status: 4          Anesthesia Type: MAC    Nikki Phase I:      Nikki Phase II:      Last vitals: Reviewed and per EMR flowsheets.        Anesthesia Post Evaluation    Patient location during evaluation: PACU  Patient participation: complete - patient participated  Level of consciousness: awake and alert  Pain score: 1  Airway patency: patent  Nausea & Vomiting: no nausea and no vomiting  Complications: no  Cardiovascular status: hemodynamically stable  Respiratory status: acceptable  Hydration status: euvolemic

## 2021-11-15 ASSESSMENT — ENCOUNTER SYMPTOMS
SHORTNESS OF BREATH: 0
CONSTIPATION: 0
COUGH: 0
ABDOMINAL PAIN: 0

## 2021-11-15 NOTE — PROGRESS NOTES
Subjective:      Patient ID: Christian Vee is a 73 y.o. female. 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 PMH is significant for CAD (cath 5/2021 95% mid LAD,  circ, 90% mid RCA, 70% ostial PDA----> s/p MARYLOU mid LAD and mid RCA), CKD on HD, CHF, HTN, DM and has been followed by cardiology. 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 LVEF of 50-55%. Patient denies any CP, SOB or dizziness. Since her last visit she has underwent KIARA to further assess MV anatomy. This showed:  Structurally normal anterior mitral leaflet with echodensity attached to   the ventricular side of the posterior leaflet(1.0x 1.2cm) with restriction   excursion, mild MR, mild TR. She is currently on brilinta. Of note she has history of right breast CA, treated with mastectomy and chemo/rads; she is jehovahs witness. HPI    Review of Systems   Constitutional: Negative for chills and fever. Respiratory: Negative for cough and shortness of breath. Cardiovascular: Negative for chest pain and palpitations. Gastrointestinal: Negative for abdominal pain and constipation. Neurological: Negative for syncope and light-headedness. Objective:   Physical Exam  Constitutional:       General: She is not in acute distress. Cardiovascular:      Rate and Rhythm: Normal rate and regular rhythm. Pulmonary:      Effort: Pulmonary effort is normal. No respiratory distress. Abdominal:      Palpations: Abdomen is soft. Tenderness: There is no guarding. Skin:     General: Skin is warm and dry. Neurological:      General: No focal deficit present. Mental Status: She is alert and oriented to person, place, and time.    Psychiatric:         Mood and

## 2021-11-16 ENCOUNTER — OFFICE VISIT (OUTPATIENT)
Dept: FAMILY MEDICINE CLINIC | Age: 66
End: 2021-11-16
Payer: COMMERCIAL

## 2021-11-16 ENCOUNTER — INITIAL CONSULT (OUTPATIENT)
Dept: CARDIOTHORACIC SURGERY | Age: 66
End: 2021-11-16
Payer: COMMERCIAL

## 2021-11-16 VITALS
WEIGHT: 182 LBS | DIASTOLIC BLOOD PRESSURE: 63 MMHG | SYSTOLIC BLOOD PRESSURE: 161 MMHG | BODY MASS INDEX: 26.05 KG/M2 | HEIGHT: 70 IN | HEART RATE: 68 BPM

## 2021-11-16 VITALS — OXYGEN SATURATION: 99 % | HEART RATE: 66 BPM | TEMPERATURE: 97.8 F

## 2021-11-16 DIAGNOSIS — N18.4 CKD (CHRONIC KIDNEY DISEASE) STAGE 4, GFR 15-29 ML/MIN (HCC): ICD-10-CM

## 2021-11-16 DIAGNOSIS — B02.9 HERPES ZOSTER WITHOUT COMPLICATION: Primary | ICD-10-CM

## 2021-11-16 DIAGNOSIS — I05.8 MITRAL VALVE MASS: ICD-10-CM

## 2021-11-16 PROCEDURE — G8484 FLU IMMUNIZE NO ADMIN: HCPCS | Performed by: FAMILY MEDICINE

## 2021-11-16 PROCEDURE — 99213 OFFICE O/P EST LOW 20 MIN: CPT | Performed by: FAMILY MEDICINE

## 2021-11-16 PROCEDURE — G8427 DOCREV CUR MEDS BY ELIG CLIN: HCPCS | Performed by: STUDENT IN AN ORGANIZED HEALTH CARE EDUCATION/TRAINING PROGRAM

## 2021-11-16 PROCEDURE — G8400 PT W/DXA NO RESULTS DOC: HCPCS | Performed by: STUDENT IN AN ORGANIZED HEALTH CARE EDUCATION/TRAINING PROGRAM

## 2021-11-16 PROCEDURE — G8427 DOCREV CUR MEDS BY ELIG CLIN: HCPCS | Performed by: FAMILY MEDICINE

## 2021-11-16 PROCEDURE — 99214 OFFICE O/P EST MOD 30 MIN: CPT | Performed by: STUDENT IN AN ORGANIZED HEALTH CARE EDUCATION/TRAINING PROGRAM

## 2021-11-16 PROCEDURE — 1090F PRES/ABSN URINE INCON ASSESS: CPT | Performed by: FAMILY MEDICINE

## 2021-11-16 PROCEDURE — G8484 FLU IMMUNIZE NO ADMIN: HCPCS | Performed by: STUDENT IN AN ORGANIZED HEALTH CARE EDUCATION/TRAINING PROGRAM

## 2021-11-16 PROCEDURE — 3017F COLORECTAL CA SCREEN DOC REV: CPT | Performed by: STUDENT IN AN ORGANIZED HEALTH CARE EDUCATION/TRAINING PROGRAM

## 2021-11-16 PROCEDURE — G8417 CALC BMI ABV UP PARAM F/U: HCPCS | Performed by: FAMILY MEDICINE

## 2021-11-16 PROCEDURE — 4040F PNEUMOC VAC/ADMIN/RCVD: CPT | Performed by: FAMILY MEDICINE

## 2021-11-16 PROCEDURE — 4040F PNEUMOC VAC/ADMIN/RCVD: CPT | Performed by: STUDENT IN AN ORGANIZED HEALTH CARE EDUCATION/TRAINING PROGRAM

## 2021-11-16 PROCEDURE — G8400 PT W/DXA NO RESULTS DOC: HCPCS | Performed by: FAMILY MEDICINE

## 2021-11-16 PROCEDURE — 1123F ACP DISCUSS/DSCN MKR DOCD: CPT | Performed by: FAMILY MEDICINE

## 2021-11-16 PROCEDURE — 1036F TOBACCO NON-USER: CPT | Performed by: STUDENT IN AN ORGANIZED HEALTH CARE EDUCATION/TRAINING PROGRAM

## 2021-11-16 PROCEDURE — 1036F TOBACCO NON-USER: CPT | Performed by: FAMILY MEDICINE

## 2021-11-16 PROCEDURE — G8417 CALC BMI ABV UP PARAM F/U: HCPCS | Performed by: STUDENT IN AN ORGANIZED HEALTH CARE EDUCATION/TRAINING PROGRAM

## 2021-11-16 PROCEDURE — 1090F PRES/ABSN URINE INCON ASSESS: CPT | Performed by: STUDENT IN AN ORGANIZED HEALTH CARE EDUCATION/TRAINING PROGRAM

## 2021-11-16 PROCEDURE — 3017F COLORECTAL CA SCREEN DOC REV: CPT | Performed by: FAMILY MEDICINE

## 2021-11-16 PROCEDURE — 1123F ACP DISCUSS/DSCN MKR DOCD: CPT | Performed by: STUDENT IN AN ORGANIZED HEALTH CARE EDUCATION/TRAINING PROGRAM

## 2021-11-16 RX ORDER — VALACYCLOVIR HYDROCHLORIDE 1 G/1
1000 TABLET, FILM COATED ORAL 3 TIMES DAILY
Qty: 21 TABLET | Refills: 0 | Status: SHIPPED | OUTPATIENT
Start: 2021-11-16 | End: 2021-11-16 | Stop reason: CLARIF

## 2021-11-16 RX ORDER — VALACYCLOVIR HCL 500 MG
500 TABLET ORAL DAILY
Qty: 7 TABLET | Refills: 0 | Status: SHIPPED | OUTPATIENT
Start: 2021-11-16 | End: 2021-12-17

## 2021-11-16 ASSESSMENT — ENCOUNTER SYMPTOMS
NAUSEA: 0
DIARRHEA: 0
SHORTNESS OF BREATH: 0
ABDOMINAL PAIN: 0
SORE THROAT: 0
VOMITING: 0
CHEST TIGHTNESS: 0
EYE DISCHARGE: 0
EYE PAIN: 0
BLOOD IN STOOL: 0
PHOTOPHOBIA: 0
TROUBLE SWALLOWING: 0
SINUS PAIN: 0
ALLERGIC/IMMUNOLOGIC NEGATIVE: 1
COLOR CHANGE: 1
BACK PAIN: 0
EYE REDNESS: 0
COUGH: 0

## 2021-11-16 NOTE — PROGRESS NOTES
light-headedness, numbness and headaches. Hematological: Negative for adenopathy. Does not bruise/bleed easily. Psychiatric/Behavioral: Negative. Current Outpatient Medications:     VALTREX 500 MG tablet, Take 1 tablet by mouth daily, Disp: 7 tablet, Rfl: 0    bumetanide (BUMEX) 2 MG tablet, take 1 tablet by mouth 3 times a week on sunday, tuesday and thursday, Disp: 60 tablet, Rfl: 0    lanthanum (FOSRENOL) 1000 MG chewable tablet, CHEW AND SWALLOW ONE TABLET BY MOUTH THREE TIMES DAILY WITH MEALS, Disp: , Rfl:     isosorbide mononitrate (IMDUR) 30 MG extended release tablet, Take 1 tablet by mouth daily, Disp: 30 tablet, Rfl: 1    allopurinol (ZYLOPRIM) 100 MG tablet, Take 1 tablet by mouth daily, Disp: 90 tablet, Rfl: 1    atorvastatin (LIPITOR) 40 MG tablet, Take 1 tablet by mouth daily, Disp: 90 tablet, Rfl: 1    metoprolol succinate (TOPROL XL) 25 MG extended release tablet, Take 1 tablet by mouth daily, Disp: 90 tablet, Rfl: 1    blood glucose test strips (ASCENSIA AUTODISC VI;ONE TOUCH ULTRA TEST VI) strip, 1 each by In Vitro route 2 times daily, Disp: 200 each, Rfl: 5    ticagrelor (BRILINTA) 90 MG TABS tablet, Take 1 tablet by mouth 2 times daily, Disp: 180 tablet, Rfl: 1    gabapentin (NEURONTIN) 100 MG capsule, Take 1 capsule by mouth as needed (with dialysis) for up to 30 days.  Intended supply: 30 days, Disp: 90 capsule, Rfl: 0    aspirin 81 MG EC tablet, Take 1 tablet by mouth daily, Disp: 30 tablet, Rfl: 3    hydrALAZINE (APRESOLINE) 10 MG tablet, Take 1 tablet by mouth every 8 hours (Patient taking differently: Take 10 mg by mouth 2 times daily ), Disp: 270 tablet, Rfl: 1    midodrine (PROAMATINE) 5 MG tablet, Take 1 tablet by mouth as needed (may repeat x1 durring HD for assymptomatic SBP<100), Disp: 90 tablet, Rfl: 0    B Complex-C-Folic Acid (BONI CAPS) 1 MG CAPS, Take 1 mg by mouth daily, Disp: , Rfl:     insulin glargine (LANTUS SOLOSTAR) 100 UNIT/ML injection pen, Inject 5 Units into the skin nightly, Disp: , Rfl:     omeprazole (PRILOSEC) 20 MG delayed release capsule, Take 20 mg by mouth nightly, Disp: , Rfl:     LUMIGAN 0.01 % SOLN ophthalmic drops, Place 1 drop into the right eye nightly , Disp: , Rfl:     COMBIGAN 0.2-0.5 % ophthalmic solution, Place 1 drop into the right eye every 12 hours , Disp: , Rfl: 7    Insulin Pen Needle (ULTICARE MINI PEN NEEDLES) 31G X 6 MM MISC, USE 1 PEN NEEDLE 4 times daily, Disp: 100 each, Rfl: 5  Allergies   Allergen Reactions    Lasix [Furosemide] Other (See Comments)     States her kidneys shut down       Past Medical History:   Diagnosis Date    Acute infection of bone (HCC)     infection of rt foot, resolved.     Anemia of chronic disease     Breast cancer ([address]     right breast, 2008/ bladder, 2006- last chemotherapy \"years ago\"    CAD (coronary artery disease)     Carpal tunnel syndrome     bilat - for OR left hand 3-17-20     Chronic diastolic CHF (congestive heart failure) ([address] 09/23/2014 9/23/14- echocardiogram revealed moderate LV concentric hypertrophy, stage III diastolic dysfunction, mild tricuspid regurgitation    CKD (chronic kidney disease) stage 4, GFR 15-29 ml/min ([facility])     Diabetic retinopathy ([address]     Glaucoma     Hemodialysis patient ([address]     [facility]- Dr. Lina Castleman - left arm fistula     Hyperkalemia, diminished renal excretion 11/9/2017    Hypertension     Hypoglycemia unawareness in type 1 diabetes mellitus ([address] 11/7/2017    Insulin dependent type 2 diabetes mellitus ([address]     Neuropathy     feet    Osteomyelitis due to secondary diabetes ([address]     rt great toe with amputation    Patient is [employer] 11/7/2017    Refusal of blood product     patient states she dose not take blood transfusion    Ventricular hypertrophy     Vitreous hemorrhage ([address]     left eye     Past Surgical History:   Procedure Laterality Date    AMPUTATION      right great toe    ANKLE SURGERY      correction on charcot joint of right ankle    CARPAL TUNNEL RELEASE Left 3/17/2020    LEFT CARPAL TUNNEL RELEASE performed by Diana Cardenas MD at [address]      bilateral    CHOLECYSTECTOMY      COLONOSCOPY      CYSTOSCOPY      DIALYSIS FISTULA CREATION Left 01/31/2018    upper arm/Dr. Jesus Pagan    ECHO COMPL W DOP COLOR FLOW  2/14/2013         ECHO COMPLETE  9/17/2013         MASTECTOMY      right    OTHER SURGICAL HISTORY  9/27/2011    PPV, membranectomy, laser Right eye    OTHER SURGICAL HISTORY  insertion lumbar drain insertion    10/12/`14    OTHER SURGICAL HISTORY  10/22/15    percutaneous lead placement for spinal cord stimulator    OTHER SURGICAL HISTORY  11/03/2015    Spinal; cord stimulator- turned off as of 3-10-20     WI AV ANAST,UP ARM BASILIC VEIN TRANSPOSIT Left 5/15/2018    TRANSPOSITION STAGE II AV FISTULA - LEFT UPPER ARM performed by Lily Linton MD at [address] [facility] ANGIOACCESS AV FISTULA Left 9/25/2018    SUPERFICIALIZATION AV FISTULA - LEFT ARM performed by Lily Linton MD at [address] [facility] W/VITRECTOMY ANY METH Left 4/10/2018    PARS PLANA VITRECTOMY 25 GAUGE RETINAL DETACHMENT REPAIR air fluid exchange, endolaser performed by Kaleigh Hunter MD at [address] TRANSESOPHAGEAL ECHOCARDIOGRAM  11/11/2021    Dr. Ofelia Dimas TUNNELED [address]  11/15/2017    VITRECTOMY Left 04/10/2018    PARS PLANA VITRECTOMY; RETINAL DETACHMENT REPAIR; GAS BUBBLE; LASER LEFT EYE     Family History   Problem Relation Age of Onset    Breast Cancer Mother 61    Hypertension Mother     Heart Disease Father     Prostate Cancer Father     Breast Cancer Maternal Grandmother 61     Social History     Socioeconomic History    Marital status: Single     Spouse name: Not on file    Number of children: Not on file    Years of education: Not on file    Highest education level: Not on file Mouth/Throat:      Pharynx: No oropharyngeal exudate. Eyes:      Conjunctiva/sclera: Conjunctivae normal.      Pupils: Pupils are equal, round, and reactive to light. Neck:      Thyroid: No thyromegaly. Trachea: No tracheal deviation. Cardiovascular:      Rate and Rhythm: Normal rate and regular rhythm. Heart sounds: No murmur heard. No friction rub. No gallop. Pulmonary:      Effort: Pulmonary effort is normal. No respiratory distress. Breath sounds: Normal breath sounds. Abdominal:      General: Bowel sounds are normal.      Palpations: Abdomen is soft. Musculoskeletal:         General: No tenderness or deformity. Normal range of motion. Cervical back: Normal range of motion and neck supple. Lymphadenopathy:      Cervical: No cervical adenopathy. Skin:     General: Skin is warm and dry. Capillary Refill: Capillary refill takes less than 2 seconds. Coloration: Skin is pale. Findings: Erythema and rash present. Comments: Painful, vesicular, irritated, rash of the neck, shoulder, anterior chest.  Left cervical dermatome   Neurological:      Mental Status: She is alert and oriented to person, place, and time. Sensory: No sensory deficit. Motor: No abnormal muscle tone.       Coordination: Coordination normal.      Deep Tendon Reflexes: Reflexes normal.             Seen By:  Aislinn Damian DO

## 2021-11-16 NOTE — LETTER
[address] [facility]  [address] [address] [facility] [address] [place] [address]  Phone: [phone]  Fax: [phone]    Vicente Morris,     November 16, 2021     Angelita Leonard, [address] [facility] [address] [address]    Patient: Wilian Graves   MR Number: [id number]   YOB: 1955   Date of Visit: 11/16/2021       Dear Angelita Leonard:    Thank you for referring Sancho Perkins to me for evaluation/treatment. Below are the relevant portions of my assessment and plan of care. Past Medical History:   Diagnosis Date    Acute infection of bone ([address]     infection of rt foot, resolved.     Anemia of chronic disease     Breast cancer ([address]     right breast, 2008/ bladder, 2006- last chemotherapy \"years ago\"    CAD (coronary artery disease)     Carpal tunnel syndrome     bilat - for OR left hand 3-17-20     Chronic diastolic CHF (congestive heart failure) ([address] 09/23/2014 9/23/14- echocardiogram revealed moderate LV concentric hypertrophy, stage III diastolic dysfunction, mild tricuspid regurgitation    CKD (chronic kidney disease) stage 4, GFR 15-29 ml/min ([facility])     Diabetic retinopathy ([address]     Glaucoma     Hemodialysis patient ([address]     [facility]- Dr. Audrey Benjamin - left arm fistula     Hyperkalemia, diminished renal excretion 11/9/2017    Hypertension     Hypoglycemia unawareness in type 1 diabetes mellitus ([address] 11/7/2017    Insulin dependent type 2 diabetes mellitus ([address]     Neuropathy     feet    Osteomyelitis due to secondary diabetes ([address]     rt great toe with amputation    Patient is Roman Catholic 11/7/2017    Refusal of blood product     patient states she dose not take blood transfusion    Ventricular hypertrophy     Vitreous hemorrhage ([address]     left eye       Past Surgical History:   Procedure Laterality Date    AMPUTATION      right great toe    ANKLE SURGERY      correction on charcot joint of right ankle    CARPAL TUNNEL RELEASE Left 3/17/2020    LEFT CARPAL TUNNEL RELEASE performed by Wolf Hoskins MD at [address]      bilateral    CHOLECYSTECTOMY      COLONOSCOPY      CYSTOSCOPY      DIALYSIS FISTULA CREATION Left 01/31/2018    upper arm/Dr. Kj Cordova    ECHO COMPL W DOP COLOR FLOW  2/14/2013         ECHO COMPLETE  9/17/2013         MASTECTOMY      right    OTHER SURGICAL HISTORY  9/27/2011    PPV, membranectomy, laser Right eye    OTHER SURGICAL HISTORY  insertion lumbar drain insertion    10/12/`14    OTHER SURGICAL HISTORY  10/22/15    percutaneous lead placement for spinal cord stimulator    OTHER SURGICAL HISTORY  11/03/2015    Spinal; cord stimulator- turned off as of 3-10-20     MS AV ANAST,UP ARM BASILIC VEIN TRANSPOSIT Left 5/15/2018    TRANSPOSITION STAGE II AV FISTULA - LEFT UPPER ARM performed by Stew Gee MD at [address] [facility] ANGIOACCESS AV FISTULA Left 9/25/2018    SUPERFICIALIZATION AV FISTULA - LEFT ARM performed by Stew Gee MD at [address] [facility] W/VITRECTOMY ANY METH Left 4/10/2018    PARS PLANA VITRECTOMY 25 GAUGE RETINAL DETACHMENT REPAIR air fluid exchange, endolaser performed by Kathya Lawrence MD at [address]      [address]    TRANSESOPHAGEAL ECHOCARDIOGRAM  11/11/2021    Dr. Jeremie Saldaña TUNNELED VENOUS CATHETER PLACEMENT  11/15/2017    VITRECTOMY Left 04/10/2018    PARS PLANA VITRECTOMY; RETINAL DETACHMENT REPAIR; GAS BUBBLE; LASER LEFT EYE       Family History   Problem Relation Age of Onset    Breast Cancer Mother 61    Hypertension Mother     Heart Disease Father     Prostate Cancer Father     Breast Cancer Maternal Grandmother 61       Allergies   Allergen Reactions    Lasix [Furosemide] Other (See Comments)     States her kidneys shut down         Current Outpatient Medications:     bumetanide (BUMEX) 2 MG tablet, take 1 tablet by mouth 3 times a week on sunday, tuesday and thursday, Disp: 60 tablet, Rfl: 0    lanthanum (FOSRENOL) 1000 MG chewable tablet, CHEW AND SWALLOW ONE TABLET BY MOUTH THREE TIMES DAILY WITH MEALS, Disp: , Rfl:     isosorbide mononitrate (IMDUR) 30 MG extended release tablet, Take 1 tablet by mouth daily, Disp: 30 tablet, Rfl: 1    allopurinol (ZYLOPRIM) 100 MG tablet, Take 1 tablet by mouth daily, Disp: 90 tablet, Rfl: 1    atorvastatin (LIPITOR) 40 MG tablet, Take 1 tablet by mouth daily, Disp: 90 tablet, Rfl: 1    metoprolol succinate (TOPROL XL) 25 MG extended release tablet, Take 1 tablet by mouth daily, Disp: 90 tablet, Rfl: 1    Insulin Pen Needle (ULTICARE MINI PEN NEEDLES) 31G X 6 MM MISC, USE 1 PEN NEEDLE 4 times daily, Disp: 100 each, Rfl: 5    blood glucose test strips (ASCENSIA AUTODISC VI;ONE TOUCH ULTRA TEST VI) strip, 1 each by In Vitro route 2 times daily, Disp: 200 each, Rfl: 5    ticagrelor (BRILINTA) 90 MG TABS tablet, Take 1 tablet by mouth 2 times daily, Disp: 180 tablet, Rfl: 1    aspirin 81 MG EC tablet, Take 1 tablet by mouth daily, Disp: 30 tablet, Rfl: 3    hydrALAZINE (APRESOLINE) 10 MG tablet, Take 1 tablet by mouth every 8 hours (Patient taking differently: Take 10 mg by mouth 2 times daily ), Disp: 270 tablet, Rfl: 1    midodrine (PROAMATINE) 5 MG tablet, Take 1 tablet by mouth as needed (may repeat x1 durring HD for assymptomatic SBP<100), Disp: 90 tablet, Rfl: 0    B Complex-C-Folic Acid (BONI CAPS) 1 MG CAPS, Take 1 mg by mouth daily, Disp: , Rfl:     insulin glargine (LANTUS SOLOSTAR) 100 UNIT/ML injection pen, Inject 5 Units into the skin nightly, Disp: , Rfl:     insulin lispro, 1 Unit Dial, (HUMALOG KWIKPEN) 100 UNIT/ML SOPN, Inject 0-4 Units into the skin 3 times daily, Disp: , Rfl:     omeprazole (PRILOSEC) 20 MG delayed release capsule, Take 20 mg by mouth nightly, Disp: , Rfl:     LUMIGAN 0.01 % SOLN ophthalmic drops, Place 1 drop into the right eye nightly , Disp: , Rfl:     COMBIGAN 0.2-0.5 % ophthalmic solution, Place 1 drop into the right eye every 12 hours , Disp: , Rfl: 7    gabapentin (NEURONTIN) 100 MG capsule, Take 1 capsule by mouth as needed (with dialysis) for up to 30 days. Intended supply: 30 days, Disp: 90 capsule, Rfl: 0    Social History     Tobacco Use    Smoking status: Never Smoker    Smokeless tobacco: Never Used   Substance Use Topics    Alcohol use: No       Vitals:    11/16/21 1252   BP: (!) 161/63   Pulse: 68   Weight: 182 lb (82.6 kg)   Height: 5' 10\" (1.778 m)       Subjective:      Patient ID: Kath Bledsoe is a 73 y.o. female. 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 PMH is significant for CAD (cath 5/2021 95% mid LAD,  circ, 90% mid RCA, 70% ostial PDA----> s/p MARYLOU mid LAD and mid RCA), CKD on HD, CHF, HTN, DM and has been followed by cardiology. 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 LVEF of 50-55%. Patient denies any CP, SOB or dizziness. Since her last visit she has underwent KIARA to further assess MV anatomy. This showed:  Structurally normal anterior mitral leaflet with echodensity attached to   the ventricular side of the posterior leaflet(1.0x 1.2cm) with restriction   excursion, mild MR, mild TR. She is currently on brilinta. Of note she has history of right breast CA, treated with mastectomy and chemo/rads; she is jehovahs witness. HPI    Review of Systems   Constitutional: Negative for chills and fever. Respiratory: Negative for cough and shortness of breath. Cardiovascular: Negative for chest pain and palpitations. Gastrointestinal: Negative for abdominal pain and constipation. Neurological: Negative for syncope and light-headedness.        Objective:   Physical Exam  Constitutional:       General: She is not in acute distress. Cardiovascular:      Rate and Rhythm: Normal rate and regular rhythm. Pulmonary:      Effort: Pulmonary effort is normal. No respiratory distress. Abdominal:      Palpations: Abdomen is soft. Tenderness: There is no guarding. Skin:     General: Skin is warm and dry. Neurological:      General: No focal deficit present. Mental Status: She is alert and oriented to person, place, and time. Psychiatric:         Mood and Affect: Mood normal.         Behavior: Behavior normal.         Assessment:      74 yo female with PMH significant for CAD (cath 5/2021 95% mid LAD,  circ, 90% mid RCA, 70% ostial PDA----> s/p MARYLOU mid LAD and mid RCA), CKD on HD, CHF, HTN, DM, right breast cancer s/p mastectomy, chemo/radiation presents to clinic for follow-up regarding mitral valve mass, initially concerning for papillary fibroelastoma. Of note, she is Latter-day. Plan:      Obtain preop studies including diagnostic LHC, CT chest, vascular studies, PFTs  STS risk of 1.8% mortality, 12% morbidity discussed with patient and sister  Will schedule OR (KIARA, excision of mitral valve mass) pending preop studies  Family to discuss blood products with Sikhism and will provide documentation                   If you have questions, please do not hesitate to call me. I look forward to following Claudai along with you.     Sincerely,      Therese Atkins, DO

## 2021-11-18 DIAGNOSIS — I73.9 PVD (PERIPHERAL VASCULAR DISEASE) (HCC): ICD-10-CM

## 2021-11-18 DIAGNOSIS — I25.10 CAD IN NATIVE ARTERY: ICD-10-CM

## 2021-11-18 DIAGNOSIS — R09.89 BRUIT: Primary | ICD-10-CM

## 2021-11-23 ENCOUNTER — OFFICE VISIT (OUTPATIENT)
Dept: NEUROLOGY | Age: 66
End: 2021-11-23
Payer: COMMERCIAL

## 2021-11-23 VITALS
WEIGHT: 182 LBS | OXYGEN SATURATION: 99 % | SYSTOLIC BLOOD PRESSURE: 98 MMHG | RESPIRATION RATE: 18 BRPM | BODY MASS INDEX: 26.05 KG/M2 | DIASTOLIC BLOOD PRESSURE: 52 MMHG | HEIGHT: 70 IN | HEART RATE: 65 BPM | TEMPERATURE: 97.2 F

## 2021-11-23 DIAGNOSIS — Z76.0 MEDICATION REFILL: ICD-10-CM

## 2021-11-23 PROCEDURE — G8417 CALC BMI ABV UP PARAM F/U: HCPCS | Performed by: PSYCHIATRY & NEUROLOGY

## 2021-11-23 PROCEDURE — 1090F PRES/ABSN URINE INCON ASSESS: CPT | Performed by: PSYCHIATRY & NEUROLOGY

## 2021-11-23 PROCEDURE — G8428 CUR MEDS NOT DOCUMENT: HCPCS | Performed by: PSYCHIATRY & NEUROLOGY

## 2021-11-23 PROCEDURE — G8484 FLU IMMUNIZE NO ADMIN: HCPCS | Performed by: PSYCHIATRY & NEUROLOGY

## 2021-11-23 PROCEDURE — 99215 OFFICE O/P EST HI 40 MIN: CPT | Performed by: PSYCHIATRY & NEUROLOGY

## 2021-11-23 RX ORDER — GABAPENTIN 100 MG/1
100 CAPSULE ORAL 3 TIMES DAILY
Qty: 90 CAPSULE | Refills: 5 | Status: SHIPPED
Start: 2021-11-23 | End: 2022-03-24 | Stop reason: SDUPTHER

## 2021-11-23 NOTE — PROGRESS NOTES
This 72-year-old right-handed -American woman was referred for additional evaluation and management of burning sensations, especially during hemodialysis. She was an excellent historian. Her medications were gabapentin, bumetanide, lanthanum, isosorbide, allopurinol, atorvastatin, metoprolol, ticagrelor, hydralazine, aspirin, midodrine, insulins, omeprazole and multivitamins. Her medical history was remarkable for hypertension, well-controlled, renal failure secondary to multiple causes including sepsis on hemodialysis, coronary artery disease, status post 2 stents, papillary fibroblastoma, pending surgery, ventricular fibrillation, degenerative joint disease and cancers or breast, cervix and bladder. She denied strokes, seizures, other heart disorders, lung disease, gastrointestinal issues, other connective tissue disorders, skin abnormalities or depression. Her surgeries were right mastectomy followed by chemotherapy and radiation therapy 11 years ago, without return. Additional interventions included colposcopy and bladder surgery as well as cholecystectomy and irritation of her right big toe from osteomyelitis. She denied allergies or trauma. She was a native of the [facility]. She never  or had children. She formerly worked as an  and . There was no pertinent family history. She never smoked, drank or used illicit drugs. She slept erratically; obstructive sleep apnea was never proven. She ate well. She exercised moderately. She received her COVID-19 vaccinations. Review of systems was otherwise unremarkable, except as noted below. Her neurological problems began almost 4 years ago, with the start of hemodialysis. During that procedure, she experienced burning sensations at first in her legs and now throughout her entire body. These painful feelings abated after dialysis.   She received 100 mg of gabapentin prior to dialysis; this walker. She turned en bloc. She swayed with Romberg's testing. Laboratory data included a recent hemoglobin A1c level of 5.7. CMP was unremarkable except for a GFR of 12. CBC with differential revealed a moderate chronic anemia. A CT scan of her head from May of this year was unremarkable. CTAs were also unrevealing. A recent electrodiagnostic study by me found diffuse symmetrical sensorimotor peripheral neuropathy of the axonal degenerative type, of marked severity and left cervical radiculopathy. This individual presents with burning sensations especially during dialysis. On examination she displays a profound peripheral neuropathy, from her longstanding end-stage renal disease and diabetes. Dialysis may be aggravating her peripheral neuropathic disorder, producing her pains. I will first increase gabapentin 200 mg 3 times daily. Additional moderate increases will be considered, if needed, and in light of her end-stage renal disease. Duloxetine may also be used. She is otherwise stable neurologically. She is also stable medically despite her multiple comorbidities. She will return in 4 months. Gabapentin was increased to 100 mg 3 times daily, and not as needed. She report back in 1 month. She will call at any time if problems arise. I spent 80 minutes with the patient with over 50 % spent in counseling and disease management. All patient issues were addressed and all questions were answered.

## 2021-11-24 ENCOUNTER — OFFICE VISIT (OUTPATIENT)
Dept: FAMILY MEDICINE CLINIC | Age: 66
End: 2021-11-24
Payer: COMMERCIAL

## 2021-11-24 VITALS
HEART RATE: 63 BPM | BODY MASS INDEX: 26.26 KG/M2 | OXYGEN SATURATION: 91 % | TEMPERATURE: 97.2 F | SYSTOLIC BLOOD PRESSURE: 116 MMHG | WEIGHT: 183 LBS | DIASTOLIC BLOOD PRESSURE: 60 MMHG

## 2021-11-24 DIAGNOSIS — Z99.2 CONTROLLED TYPE 2 DIABETES MELLITUS WITH CHRONIC KIDNEY DISEASE ON CHRONIC DIALYSIS, WITH LONG-TERM CURRENT USE OF INSULIN (HCC): ICD-10-CM

## 2021-11-24 DIAGNOSIS — Z76.0 MEDICATION REFILL: ICD-10-CM

## 2021-11-24 DIAGNOSIS — Z79.4 CONTROLLED TYPE 2 DIABETES MELLITUS WITH CHRONIC KIDNEY DISEASE ON CHRONIC DIALYSIS, WITH LONG-TERM CURRENT USE OF INSULIN (HCC): ICD-10-CM

## 2021-11-24 DIAGNOSIS — E11.22 CONTROLLED TYPE 2 DIABETES MELLITUS WITH CHRONIC KIDNEY DISEASE ON CHRONIC DIALYSIS, WITH LONG-TERM CURRENT USE OF INSULIN (HCC): ICD-10-CM

## 2021-11-24 DIAGNOSIS — B02.29 POST HERPETIC NEURALGIA: Primary | ICD-10-CM

## 2021-11-24 DIAGNOSIS — N18.6 CONTROLLED TYPE 2 DIABETES MELLITUS WITH CHRONIC KIDNEY DISEASE ON CHRONIC DIALYSIS, WITH LONG-TERM CURRENT USE OF INSULIN (HCC): ICD-10-CM

## 2021-11-24 PROCEDURE — 4040F PNEUMOC VAC/ADMIN/RCVD: CPT | Performed by: STUDENT IN AN ORGANIZED HEALTH CARE EDUCATION/TRAINING PROGRAM

## 2021-11-24 PROCEDURE — 3044F HG A1C LEVEL LT 7.0%: CPT | Performed by: STUDENT IN AN ORGANIZED HEALTH CARE EDUCATION/TRAINING PROGRAM

## 2021-11-24 PROCEDURE — G8484 FLU IMMUNIZE NO ADMIN: HCPCS | Performed by: STUDENT IN AN ORGANIZED HEALTH CARE EDUCATION/TRAINING PROGRAM

## 2021-11-24 PROCEDURE — 1090F PRES/ABSN URINE INCON ASSESS: CPT | Performed by: STUDENT IN AN ORGANIZED HEALTH CARE EDUCATION/TRAINING PROGRAM

## 2021-11-24 PROCEDURE — G8417 CALC BMI ABV UP PARAM F/U: HCPCS | Performed by: STUDENT IN AN ORGANIZED HEALTH CARE EDUCATION/TRAINING PROGRAM

## 2021-11-24 PROCEDURE — 3017F COLORECTAL CA SCREEN DOC REV: CPT | Performed by: STUDENT IN AN ORGANIZED HEALTH CARE EDUCATION/TRAINING PROGRAM

## 2021-11-24 PROCEDURE — 99214 OFFICE O/P EST MOD 30 MIN: CPT | Performed by: STUDENT IN AN ORGANIZED HEALTH CARE EDUCATION/TRAINING PROGRAM

## 2021-11-24 PROCEDURE — G8400 PT W/DXA NO RESULTS DOC: HCPCS | Performed by: STUDENT IN AN ORGANIZED HEALTH CARE EDUCATION/TRAINING PROGRAM

## 2021-11-24 PROCEDURE — G8427 DOCREV CUR MEDS BY ELIG CLIN: HCPCS | Performed by: STUDENT IN AN ORGANIZED HEALTH CARE EDUCATION/TRAINING PROGRAM

## 2021-11-24 PROCEDURE — 1036F TOBACCO NON-USER: CPT | Performed by: STUDENT IN AN ORGANIZED HEALTH CARE EDUCATION/TRAINING PROGRAM

## 2021-11-24 PROCEDURE — 2022F DILAT RTA XM EVC RTNOPTHY: CPT | Performed by: STUDENT IN AN ORGANIZED HEALTH CARE EDUCATION/TRAINING PROGRAM

## 2021-11-24 PROCEDURE — 1123F ACP DISCUSS/DSCN MKR DOCD: CPT | Performed by: STUDENT IN AN ORGANIZED HEALTH CARE EDUCATION/TRAINING PROGRAM

## 2021-11-24 RX ORDER — LIDOCAINE 50 MG/G
1 PATCH TOPICAL DAILY
Qty: 7 PATCH | Refills: 0 | Status: SHIPPED | OUTPATIENT
Start: 2021-11-24 | End: 2021-12-24

## 2021-11-24 RX ORDER — METOPROLOL SUCCINATE 25 MG/1
25 TABLET, EXTENDED RELEASE ORAL DAILY
Qty: 90 TABLET | Refills: 1 | Status: SHIPPED
Start: 2021-11-24 | End: 2022-07-21

## 2021-11-24 ASSESSMENT — ENCOUNTER SYMPTOMS
NAUSEA: 0
SHORTNESS OF BREATH: 0
VOMITING: 0
COUGH: 0
ABDOMINAL PAIN: 0
RHINORRHEA: 0

## 2021-11-24 NOTE — PROGRESS NOTES
LILIAN VÁSQUEZ [facility]  Office Progress Note  Dr. Anish Escobedo      Patient:  Yamilka Mention 73 y.o. female     Date of Service: 11/24/21      Chief complaint:   Chief Complaint   Patient presents with    Rash     follow up from express 11/16/21, possible shingles         History of Present Illness   The patient is a 73 y.o. female  here to follow up of their express care visit and discuss a few chronic isues     Follow up from express for rash. Still burning on the L upper chest.  -it has improved and it is tolerable.   -there is also some pain going up her neck still  -she finished a course of valtrex yesterday  -no fever, no new spots    She is planning on undergoing cardiothoracic surgery with Dr. Yon Acuna  -she has calcifications on the mitral valve    DM  -her glucose readings are doing well overall.   -she takes 5 units of Lantus nightly  -she denies any episodes of hypoglycemia  Lab Results   Component Value Date    LABA1C 5.7 09/07/2021     No results found for: EAG      She has ESRD on hemodialysis. She follows with Dr. Yareli Pham. Dialysis is going well per the patient. She does get numbness and tingling with it but she is following with nuerology-she was started on gabapentin daily    Past Medical History:      Diagnosis Date    Acute infection of bone ([address]     infection of rt foot, resolved.     Anemia of chronic disease     Breast cancer ([address]     right breast, 2008/ bladder, 2006- last chemotherapy \"years ago\"    CAD (coronary artery disease)     Carpal tunnel syndrome     bilat - for OR left hand 3-17-20     Chronic diastolic CHF (congestive heart failure) ([address] 09/23/2014 9/23/14- echocardiogram revealed moderate LV concentric hypertrophy, stage III diastolic dysfunction, mild tricuspid regurgitation    CKD (chronic kidney disease) stage 4, GFR 15-29 ml/min ([facility])     Diabetic retinopathy ([address]     Glaucoma     Hemodialysis patient ([address]     [facility]  mon wed fri- Dr. Jammie Lemus - left arm fistula     Hyperkalemia, diminished renal excretion 11/9/2017    Hypertension     Hypoglycemia unawareness in type 1 diabetes mellitus ([facility] 11/7/2017    Insulin dependent type 2 diabetes mellitus (HCC)     Neuropathy     feet    Osteomyelitis due to secondary diabetes ([facility]     rt great toe with amputation    Patient is Sikhism 11/7/2017    Refusal of blood product     patient states she dose not take blood transfusion    Ventricular hypertrophy     Vitreous hemorrhage ([facility]     left eye       Review of Systems:   Review of Systems   Constitutional: Negative for chills and fever. HENT: Negative for congestion and rhinorrhea. Respiratory: Negative for cough and shortness of breath. Cardiovascular: Negative for chest pain and leg swelling. Gastrointestinal: Negative for abdominal pain, nausea and vomiting. Genitourinary: Negative for dysuria and hematuria. Musculoskeletal: Positive for neck pain. Negative for arthralgias and myalgias. Skin: Positive for rash. Negative for wound. Neurological: Negative for dizziness, light-headedness and numbness. Physical Exam   Vitals: /60   Pulse 63   Temp 97.2 °F (36.2 °C)   Wt 183 lb (83 kg)   SpO2 91%   BMI 26.26 kg/m²   Physical Exam    General:  Well developed, well nourished, well groomed. No apparent distress. HEENT:  Normocephalic, atraumatic. No scleral icterus. No conjunctival injection. No nasal discharge. Heart:  RRR, no murmurs, gallops, or rubs  Lungs:  CTA bilaterally, bilat symmetrical expansion, no wheeze, rales, or rhonchi  Abdomen: Bowel sounds present, soft, nontender, no masses, no organomegaly, no peritoneal signs  Extremities:  No clubbing, cyanosis, or edema  Neuro:  Alert and oriented x3, no focal deficits  Skin she has some crusted over lesions that were likely vesicular at one point on her L clavicle and posterior neck. No current pustules or drainage      Assessment and Plan       1.  Post herpetic neuralgia  - WE will try to get a 5% lidocaine patch covered. Otherwise improving. Finished valtrex, on gabapentin. 4% patch not doing much for her    3. Controlled type 2 diabetes mellitus with chronic kidney disease on chronic dialysis, with long-term current use of insulin (HCC)  - Continue current dose of lantus. Stable. No episodes of hypoglycemia. Overalls stable. Due for new A1c next month. Thinks she had one drawn at dialysis. I asked her to send if it she has one      3. Medication refill  - metoprolol succinate (TOPROL XL) 25 MG extended release tablet; Take 1 tablet by mouth daily  Dispense: 90 tablet; Refill: 1    I advised her to continue to follow with nephrology, cardiology, and cardiothoracic surgery. She is planning on having her valve repaired next month          Counseled regarding above diagnosis, including possible risks and complications,  especially if left uncontrolled. Counseled regarding the possible side effects, risks, benefits and alternatives to treatment;patient and/or guardian verbalizes understanding, agrees, feels comfortable with and wishes to proceed with above treatment plan. Call or go to ED immediately if symptoms worsen or persist. Advised patient to call with any new medication issues, and, as applicable, read all Rx info from pharmacy to assure aware of all possible risks and side effects of medicationbefore taking. Patient and/or guardian given opportunity to ask questions/raise concerns. The patient verbalized comfort and understanding ofinstructions. Return to Office: Return in about 6 months (around 5/24/2022).     Medication List:    Current Outpatient Medications   Medication Sig Dispense Refill    metoprolol succinate (TOPROL XL) 25 MG extended release tablet Take 1 tablet by mouth daily 90 tablet 1    lidocaine (LIDODERM) 5 % Place 1 patch onto the skin daily 12 hours on, 12 hours off. 7 patch 0    gabapentin (NEURONTIN) 100 MG capsule Take 1 through the use of voice recognition software. Although effort is taken to assure the accuracy ofthis document, it is possible that grammatical, syntax, or spelling errors may occur.

## 2021-12-02 ENCOUNTER — HOSPITAL ENCOUNTER (OUTPATIENT)
Age: 66
Discharge: HOME | End: 2021-12-02
Payer: COMMERCIAL

## 2021-12-02 DIAGNOSIS — I05.9 MITRAL VALVE DISEASE: ICD-10-CM

## 2021-12-02 DIAGNOSIS — I25.119 CORONARY ARTERY DISEASE INVOLVING NATIVE CORONARY ARTERY OF NATIVE HEART WITH ANGINA PECTORIS (HCC): ICD-10-CM

## 2021-12-02 DIAGNOSIS — Z01.810 PREOPERATIVE CARDIOVASCULAR EXAMINATION: ICD-10-CM

## 2021-12-02 DIAGNOSIS — Z01.810 PREOPERATIVE CARDIOVASCULAR EXAMINATION: Primary | ICD-10-CM

## 2021-12-02 LAB
ALBUMIN SERPL-MCNC: 4.1 G/DL (ref 3.5–5.2)
ALP BLD-CCNC: 111 U/L (ref 35–104)
ALT SERPL-CCNC: 31 U/L (ref 0–32)
ANION GAP SERPL CALCULATED.3IONS-SCNC: 13 MMOL/L (ref 7–16)
AST SERPL-CCNC: 27 U/L (ref 0–31)
BILIRUB SERPL-MCNC: 0.8 MG/DL (ref 0–1.2)
BUN BLDV-MCNC: 17 MG/DL (ref 6–23)
CALCIUM SERPL-MCNC: 9.1 MG/DL (ref 8.6–10.2)
CHLORIDE BLD-SCNC: 101 MMOL/L (ref 98–107)
CO2: 28 MMOL/L (ref 22–29)
CREAT SERPL-MCNC: 4 MG/DL (ref 0.5–1)
GFR AFRICAN AMERICAN: 14
GFR NON-AFRICAN AMERICAN: 14 ML/MIN/1.73
GLUCOSE BLD-MCNC: 63 MG/DL (ref 74–99)
HCT VFR BLD CALC: 29.3 % (ref 34–48)
HEMOGLOBIN: 9 G/DL (ref 11.5–15.5)
INR BLD: 1
MCH RBC QN AUTO: 28.8 PG (ref 26–35)
MCHC RBC AUTO-ENTMCNC: 30.7 % (ref 32–34.5)
MCV RBC AUTO: 93.6 FL (ref 80–99.9)
PDW BLD-RTO: 17.3 FL (ref 11.5–15)
PLATELET # BLD: 189 E9/L (ref 130–450)
PMV BLD AUTO: 11.1 FL (ref 7–12)
POTASSIUM SERPL-SCNC: 4.4 MMOL/L (ref 3.5–5)
PROTHROMBIN TIME: 10.9 SEC (ref 9.3–12.4)
RBC # BLD: 3.13 E12/L (ref 3.5–5.5)
SODIUM BLD-SCNC: 142 MMOL/L (ref 132–146)
TOTAL PROTEIN: 6.8 G/DL (ref 6.4–8.3)
WBC # BLD: 5 E9/L (ref 4.5–11.5)

## 2021-12-02 PROCEDURE — 36415 COLL VENOUS BLD VENIPUNCTURE: CPT

## 2021-12-02 PROCEDURE — 85610 PROTHROMBIN TIME: CPT

## 2021-12-02 PROCEDURE — 80053 COMPREHEN METABOLIC PANEL: CPT

## 2021-12-02 PROCEDURE — 85027 COMPLETE CBC AUTOMATED: CPT

## 2021-12-03 DIAGNOSIS — Z76.0 MEDICATION REFILL: ICD-10-CM

## 2021-12-06 NOTE — TELEPHONE ENCOUNTER
Last Appointment:  11/24/2021  Future Appointments   Date Time Provider Dev Arizmendi   12/7/2021  9:30  [address] CT SCAN 2 SEYZ  [address] [facility]   12/7/2021 10:00  [address] VASCULAR LAB RM 2 SEYZ VASC [facility]   12/7/2021 11:00 AM [facility] US RM 4 [facility]YZ US [facility]   12/7/2021 12:00 PM [facility] US RM 4 SE[facility] US [facility]   12/7/2021  1:00 PM SEYZ PFT STRESS LAB ROOM SE[facility]Z PFT [facility]   12/9/2021  9:00 AM [facility] CVL 01 SEYZ CATH [facility]   3/15/2022  8:20 AM Rocky Bone MD [facility]   3/24/2022  8:20 AM Fab Gottlieb MD [facility]   5/24/2022  7:30 AM Balta Posey MD [address] [place] [address]

## 2021-12-07 ENCOUNTER — HOSPITAL ENCOUNTER (OUTPATIENT)
Dept: INTERVENTIONAL RADIOLOGY/VASCULAR | Age: 66
Discharge: HOME | End: 2021-12-09
Payer: COMMERCIAL

## 2021-12-07 ENCOUNTER — HOSPITAL ENCOUNTER (OUTPATIENT)
Dept: ULTRASOUND IMAGING | Age: 66
Discharge: HOME | End: 2021-12-09
Payer: COMMERCIAL

## 2021-12-07 ENCOUNTER — HOSPITAL ENCOUNTER (OUTPATIENT)
Dept: CT IMAGING | Age: 66
Discharge: HOME | End: 2021-12-09
Payer: COMMERCIAL

## 2021-12-07 DIAGNOSIS — I25.10 CAD IN NATIVE ARTERY: ICD-10-CM

## 2021-12-07 DIAGNOSIS — R09.89 BRUIT: ICD-10-CM

## 2021-12-07 DIAGNOSIS — I73.9 PVD (PERIPHERAL VASCULAR DISEASE) (HCC): ICD-10-CM

## 2021-12-07 PROCEDURE — 93880 EXTRACRANIAL BILAT STUDY: CPT | Performed by: RADIOLOGY

## 2021-12-07 PROCEDURE — 93880 EXTRACRANIAL BILAT STUDY: CPT

## 2021-12-07 PROCEDURE — 71250 CT THORAX DX C-: CPT

## 2021-12-07 PROCEDURE — 93922 UPR/L XTREMITY ART 2 LEVELS: CPT

## 2021-12-07 PROCEDURE — 93970 EXTREMITY STUDY: CPT | Performed by: RADIOLOGY

## 2021-12-07 PROCEDURE — 93970 EXTREMITY STUDY: CPT

## 2021-12-07 RX ORDER — ISOSORBIDE MONONITRATE 30 MG/1
TABLET, EXTENDED RELEASE ORAL
Qty: 30 TABLET | Refills: 5 | Status: SHIPPED
Start: 2021-12-07 | End: 2022-03-09

## 2021-12-08 ENCOUNTER — TELEPHONE (OUTPATIENT)
Dept: CARDIAC CATH/INVASIVE PROCEDURES | Age: 66
End: 2021-12-08

## 2021-12-09 ENCOUNTER — HOSPITAL ENCOUNTER (OUTPATIENT)
Dept: CARDIAC CATH/INVASIVE PROCEDURES | Age: 66
Discharge: HOME | End: 2021-12-09
Attending: INTERNAL MEDICINE | Admitting: INTERNAL MEDICINE
Payer: COMMERCIAL

## 2021-12-09 VITALS
HEIGHT: 70 IN | DIASTOLIC BLOOD PRESSURE: 56 MMHG | HEART RATE: 66 BPM | TEMPERATURE: 96.2 F | OXYGEN SATURATION: 91 % | WEIGHT: 182 LBS | BODY MASS INDEX: 26.05 KG/M2 | RESPIRATION RATE: 14 BRPM | SYSTOLIC BLOOD PRESSURE: 96 MMHG

## 2021-12-09 DIAGNOSIS — I25.10 CAD IN NATIVE ARTERY: ICD-10-CM

## 2021-12-09 LAB
ABO/RH: NORMAL
ANTIBODY SCREEN: NORMAL

## 2021-12-09 PROCEDURE — 6360000002 HC RX W HCPCS

## 2021-12-09 PROCEDURE — 93454 CORONARY ARTERY ANGIO S&I: CPT | Performed by: INTERNAL MEDICINE

## 2021-12-09 PROCEDURE — 2500000003 HC RX 250 WO HCPCS

## 2021-12-09 PROCEDURE — 86850 RBC ANTIBODY SCREEN: CPT

## 2021-12-09 PROCEDURE — C1894 INTRO/SHEATH, NON-LASER: HCPCS

## 2021-12-09 PROCEDURE — C1769 GUIDE WIRE: HCPCS

## 2021-12-09 PROCEDURE — 2709999900 HC NON-CHARGEABLE SUPPLY

## 2021-12-09 PROCEDURE — 2580000003 HC RX 258: Performed by: INTERNAL MEDICINE

## 2021-12-09 PROCEDURE — 86900 BLOOD TYPING SEROLOGIC ABO: CPT

## 2021-12-09 PROCEDURE — 36415 COLL VENOUS BLD VENIPUNCTURE: CPT

## 2021-12-09 PROCEDURE — 86901 BLOOD TYPING SEROLOGIC RH(D): CPT

## 2021-12-09 RX ORDER — ACETAMINOPHEN 325 MG/1
650 TABLET ORAL EVERY 4 HOURS PRN
Status: CANCELLED | OUTPATIENT
Start: 2021-12-09

## 2021-12-09 RX ORDER — SODIUM CHLORIDE 0.9 % (FLUSH) 0.9 %
5-40 SYRINGE (ML) INJECTION PRN
Status: CANCELLED | OUTPATIENT
Start: 2021-12-09

## 2021-12-09 RX ORDER — SODIUM CHLORIDE 9 MG/ML
INJECTION, SOLUTION INTRAVENOUS ONCE
Status: COMPLETED | OUTPATIENT
Start: 2021-12-09 | End: 2021-12-09

## 2021-12-09 RX ORDER — SODIUM CHLORIDE 0.9 % (FLUSH) 0.9 %
5-40 SYRINGE (ML) INJECTION EVERY 12 HOURS SCHEDULED
Status: CANCELLED | OUTPATIENT
Start: 2021-12-09

## 2021-12-09 RX ORDER — SODIUM CHLORIDE 9 MG/ML
25 INJECTION, SOLUTION INTRAVENOUS PRN
Status: CANCELLED | OUTPATIENT
Start: 2021-12-09

## 2021-12-09 RX ADMIN — SODIUM CHLORIDE: 9 INJECTION, SOLUTION INTRAVENOUS at 07:32

## 2021-12-09 NOTE — PROGRESS NOTES
Extended Stay Recovery Discharge Documentation    Final procedure site check completed, stable for discharge- Yes  Ambulated without issue post recovery completion, gait steady. Peripheral IV sites removed (see IV Flowsheet) and site asymptomatic- Yes  Patient dressed self without assistance. Discharge instructions reviewed with Patient and verbalized understanding.    Patient discharged Home with sister at PM  Monitor cleaned and placed back in nurses' station- Yes

## 2021-12-14 ENCOUNTER — OFFICE VISIT (OUTPATIENT)
Dept: CARDIOTHORACIC SURGERY | Age: 66
End: 2021-12-14
Payer: COMMERCIAL

## 2021-12-14 ENCOUNTER — HOSPITAL ENCOUNTER (OUTPATIENT)
Age: 66
Setting detail: SURGERY ADMIT
End: 2021-12-14
Attending: STUDENT IN AN ORGANIZED HEALTH CARE EDUCATION/TRAINING PROGRAM | Admitting: STUDENT IN AN ORGANIZED HEALTH CARE EDUCATION/TRAINING PROGRAM
Payer: COMMERCIAL

## 2021-12-14 ENCOUNTER — PREP FOR PROCEDURE (OUTPATIENT)
Dept: CARDIOTHORACIC SURGERY | Age: 66
End: 2021-12-14

## 2021-12-14 VITALS
DIASTOLIC BLOOD PRESSURE: 50 MMHG | HEIGHT: 70 IN | HEART RATE: 63 BPM | WEIGHT: 181 LBS | SYSTOLIC BLOOD PRESSURE: 104 MMHG | BODY MASS INDEX: 25.91 KG/M2

## 2021-12-14 DIAGNOSIS — I25.10 CAD IN NATIVE ARTERY: Primary | ICD-10-CM

## 2021-12-14 DIAGNOSIS — Z01.818 PREOPERATIVE TESTING: ICD-10-CM

## 2021-12-14 PROCEDURE — 1090F PRES/ABSN URINE INCON ASSESS: CPT | Performed by: STUDENT IN AN ORGANIZED HEALTH CARE EDUCATION/TRAINING PROGRAM

## 2021-12-14 PROCEDURE — 1036F TOBACCO NON-USER: CPT | Performed by: STUDENT IN AN ORGANIZED HEALTH CARE EDUCATION/TRAINING PROGRAM

## 2021-12-14 PROCEDURE — G8400 PT W/DXA NO RESULTS DOC: HCPCS | Performed by: STUDENT IN AN ORGANIZED HEALTH CARE EDUCATION/TRAINING PROGRAM

## 2021-12-14 PROCEDURE — 99214 OFFICE O/P EST MOD 30 MIN: CPT | Performed by: STUDENT IN AN ORGANIZED HEALTH CARE EDUCATION/TRAINING PROGRAM

## 2021-12-14 PROCEDURE — 1123F ACP DISCUSS/DSCN MKR DOCD: CPT | Performed by: STUDENT IN AN ORGANIZED HEALTH CARE EDUCATION/TRAINING PROGRAM

## 2021-12-14 PROCEDURE — 4040F PNEUMOC VAC/ADMIN/RCVD: CPT | Performed by: STUDENT IN AN ORGANIZED HEALTH CARE EDUCATION/TRAINING PROGRAM

## 2021-12-14 PROCEDURE — G8484 FLU IMMUNIZE NO ADMIN: HCPCS | Performed by: STUDENT IN AN ORGANIZED HEALTH CARE EDUCATION/TRAINING PROGRAM

## 2021-12-14 PROCEDURE — 3017F COLORECTAL CA SCREEN DOC REV: CPT | Performed by: STUDENT IN AN ORGANIZED HEALTH CARE EDUCATION/TRAINING PROGRAM

## 2021-12-14 PROCEDURE — G8417 CALC BMI ABV UP PARAM F/U: HCPCS | Performed by: STUDENT IN AN ORGANIZED HEALTH CARE EDUCATION/TRAINING PROGRAM

## 2021-12-14 PROCEDURE — G8427 DOCREV CUR MEDS BY ELIG CLIN: HCPCS | Performed by: STUDENT IN AN ORGANIZED HEALTH CARE EDUCATION/TRAINING PROGRAM

## 2021-12-14 RX ORDER — CHLORHEXIDINE GLUCONATE 0.12 MG/ML
15 RINSE ORAL ONCE
Status: CANCELLED | OUTPATIENT
Start: 2021-12-14 | End: 2021-12-14

## 2021-12-14 RX ORDER — SODIUM CHLORIDE 9 MG/ML
25 INJECTION, SOLUTION INTRAVENOUS PRN
Status: CANCELLED | OUTPATIENT
Start: 2021-12-14

## 2021-12-14 RX ORDER — CHLORHEXIDINE GLUCONATE 4 G/100ML
SOLUTION TOPICAL ONCE
Status: CANCELLED | OUTPATIENT
Start: 2021-12-14 | End: 2021-12-14

## 2021-12-14 RX ORDER — SODIUM CHLORIDE 0.9 % (FLUSH) 0.9 %
10 SYRINGE (ML) INJECTION EVERY 12 HOURS SCHEDULED
Status: CANCELLED | OUTPATIENT
Start: 2021-12-14

## 2021-12-14 RX ORDER — SODIUM CHLORIDE 0.9 % (FLUSH) 0.9 %
10 SYRINGE (ML) INJECTION PRN
Status: CANCELLED | OUTPATIENT
Start: 2021-12-14

## 2021-12-14 RX ORDER — SODIUM CHLORIDE 9 MG/ML
INJECTION, SOLUTION INTRAVENOUS CONTINUOUS
Status: CANCELLED | OUTPATIENT
Start: 2021-12-14

## 2021-12-14 ASSESSMENT — ENCOUNTER SYMPTOMS
COUGH: 0
WHEEZING: 0
CHEST TIGHTNESS: 0
SHORTNESS OF BREATH: 0

## 2021-12-14 NOTE — PROGRESS NOTES
Subjective:      Patient ID: Adonay Rocha is a 73 y.o. female. 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 PMH includes breast cancer, CAD, CHF, DM, HTN, HLF, and ESRD on dialysis. Since her last visit, the patient underwent KIARA. KIARA revealed normal EF, and a structurally normal anterior mitral leaflet with echodensity attached to the ventricular side of the posterior leaflet (1.0x 1.2cm) with restriction excursion. She also underwent LHC, which revealed MVCAD. She denies CP, SOB, palpitations. Review of Systems   Constitutional: Negative for chills, diaphoresis and fatigue. Respiratory: Negative for cough, chest tightness, shortness of breath and wheezing. Cardiovascular: Negative for chest pain, palpitations and leg swelling. Neurological: Negative for dizziness, syncope, weakness and light-headedness. Objective:   Physical Exam  Constitutional:       Appearance: Normal appearance. Cardiovascular:      Rate and Rhythm: Normal rate and regular rhythm. Pulses: Normal pulses. Heart sounds: Normal heart sounds. Pulmonary:      Effort: Pulmonary effort is normal.      Breath sounds: Normal breath sounds. Abdominal:      Palpations: Abdomen is soft. Skin:     General: Skin is warm and dry. Neurological:      Mental Status: She is alert and oriented to person, place, and time.    Psychiatric:         Mood and Affect: Mood normal.         Behavior: Behavior normal.       [facility]    [address] [place] [address],  [facility]                             CARDIAC CATHETERIZATION     PATIENT NAME: Felix Jerry                    :        1955  MED REC NO:   [id number]                            ROOM:       [facility]  ACCOUNT NO:   [de-identified] ADMIT DATE: 12/09/2021  PROVIDER:     Polo So MD     DATE OF PROCEDURE:  12/09/2021     PROCEDURES:  1. Coronary angiography. 2.  Conscious sedation using Versed and fentanyl.     Indication #70, score of 7.     INDICATION FOR THE PROCEDURE:  The patient is a 71-year-old female with  history of CAD, who is having mitral valve surgery. The patient is here  for coronary angiography to evaluate her coronary anatomy prior to  cardiac surgery.     DESCRIPTION OF PROCEDURE:  After the appropriate informed consent  obtained, the right wrist area was prepped and draped in the usual  sterile fashion. A timeout was called. The right wrist area was  locally anesthetized with 10 mL of 2% lidocaine. A 21-gauge needle was  used to access the right radial artery. A 6-Filipino introducer sheath  was used to cannulate the right radial artery. A 6-Filipino JL3.5 and  6-Filipino JR4 catheters were used for coronary angiography in multiple  projections.     ANGIOGRAPHIC FINDINGS:  1. The left main coronary artery arises normally from the left sinus of  Valsalva. It is a large vessel without any disease. There is mild  distal calcification. It bifurcates into left anterior descending  artery and left circumflex artery. 2.  The left anterior descending artery has moderate-to-severe proximal  and ostial calcifications. There is 20% ostial disease. The rest of  the vessel is mildly diffusely diseased. There is patent stent in the  mid segment. The LAD gives off a few small diagonal branches. The  second one is totally occluded. The rest are mildly diseased. 3.  The left circumflex artery is a large vessel that has total  occlusion after the takeoff of the second OM branch. The first OM  branch is a mid size vessel that is mildly diseased. The second one is  the small that has chronic total occlusion with a faint left-to-left  filling collaterals. 4.  The right coronary artery has inferior takeoff.   There are stents in  the mid and proximal segments. There is 80% in-stent stenosis in the  mid segment. The rest of the vessel has luminal irregularities. It  gives off good size right PDA and good size right PLV that has no  significant CAD noted. There is significant ostial RCA also noted as  evident by damping of pressure on engagement and lack of contrast  reflux.     At the end of the procedure, the catheter and the wire were pulled out  from the body. The sheath was pulled out from the body and a Vasc Band  was applied to the right wrist area with effective hemostasis.     COMPLICATIONS:  None.     ESTIMATED TOTAL BLOOD LOSS:  15 to 20 mL.     MEDICATIONS USED DURING THE PROCEDURE:  We used intraarterial verapamil  to prevent vasospasm. We used intraarterial heparin for  anticoagulation.     CONSCIOUS SEDATION:  We used Versed and fentanyl for conscious sedation. First dose was given at 09:42, procedure ended at 10:05. There was 22  minutes of direct face-to-face supervision during conscious sedation  administration.     Total contrast used was 40 mL of Isovue.     Total fluoroscopy time was 1.9 minutes.     IMPRESSION:  1. Mild disease involving left anterior descending artery with a patent  stent in the mid segment. 2.  Totally occluded left circumflex artery. 3.  Total occlusion of the second OM branch with left-to-left  collaterals. 4.  Significant ostial RCA and significant mid RCA disease as mentioned  above.     RECOMMENDATIONS:  Continue current treatment as per CT Surgery. 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 Since last visit underwent LHC which demonstrated severe MV CAD. Plan:      Will plan on CABG x2 (SVG-RCA, SVG-LCx/OM), excision of MV mass, possible MV repair 12/29  Discussed with patient and family regarding increased risk given significant MV CAD, STS mortality 3.8%, morbidity 29%; they all voiced understanding  Needs dental clearance prior to OR  Will need Epogen shots prior to surgery by PCP   Follow up US thyroid for thyroid nodule - defer to PCP   Follow up CT Chest for lung nodules - defer to PCP   Pre-op imaging complete

## 2021-12-14 NOTE — H&P
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  PMH is significant for CAD (cath 5/2021 95% mid LAD,  circ, 90% mid RCA, 70% ostial PDA----> s/p MARYLOU mid LAD and mid RCA), CKD on HD, CHF, HTN, DM and has been followed by cardiology.  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 LVEF of 50-55%. Patient denies any CP, SOB or dizziness.  Since her last visit she has underwent KIARA to further assess MV anatomy. This showed:  Structurally normal anterior mitral leaflet with echodensity attached to   the ventricular side of the posterior leaflet(1.0x 1.2cm) with restriction   excursion, mild MR, mild TR. She is currently on brilinta.      Of note she has history of right breast CA, treated with mastectomy and chemo/rads; she is jehovahs witness. Past Medical History:   Diagnosis Date    Acute infection of bone ([address]     infection of rt foot, resolved.     Anemia of chronic disease     Arthritis     Breast cancer ([address]     right breast, 2008/ bladder, 2006- last chemotherapy \"years ago\"    CAD (coronary artery disease)     Carpal tunnel syndrome     bilat - for OR left hand 3-17-20     Chronic diastolic CHF (congestive heart failure) ([address] 09/23/2014 9/23/14- echocardiogram revealed moderate LV concentric hypertrophy, stage III diastolic dysfunction, mild tricuspid regurgitation    CKD (chronic kidney disease) stage 4, GFR 15-29 ml/min ([facility])     Diabetic retinopathy ([address]     Glaucoma     Hemodialysis patient ([address]     [facility] wed fri- Dr. Danny Crawford - left arm fistula     Hyperkalemia, diminished renal excretion 11/9/2017    Hyperlipidemia     Hypertension     Hypoglycemia unawareness in type 1 diabetes mellitus ([address] 11/7/2017    Insulin dependent type 2 diabetes mellitus ([address]     Neuropathy     feet    Osteomyelitis due to secondary diabetes ([address]     rt great toe with amputation    Patient is Sabianism 11/7/2017    Refusal of blood product     patient states she dose not take blood transfusion    Ventricular hypertrophy     Vitreous hemorrhage ([address]     left eye     Past Surgical History:   Procedure Laterality Date    AMPUTATION      right great toe    ANKLE SURGERY      correction on charcot joint of right ankle    CARDIAC CATHETERIZATION  12/09/2021    Dr Ulysses Molina Left 3/17/2020    LEFT CARPAL TUNNEL RELEASE performed by Delane Romberg, MD at [address]      bilateral    CHOLECYSTECTOMY      COLONOSCOPY      CYSTOSCOPY      DIALYSIS FISTULA CREATION Left 01/31/2018    upper arm/Dr. Bia Muñoz    ECHO COMPL W DOP COLOR FLOW  2/14/2013         ECHO COMPLETE  9/17/2013         MASTECTOMY      right    OTHER SURGICAL HISTORY  9/27/2011    PPV, membranectomy, laser Right eye    OTHER SURGICAL HISTORY  insertion lumbar drain insertion    10/12/`14    OTHER SURGICAL HISTORY  10/22/15    percutaneous lead placement for spinal cord stimulator    OTHER SURGICAL HISTORY  11/03/2015    Spinal; cord stimulator- turned off as of 3-10-20     IN AV ANAST,UP ARM BASILIC VEIN TRANSPOSIT Left 5/15/2018    TRANSPOSITION STAGE II AV FISTULA - LEFT UPPER ARM performed by Daron Boykin MD at [facility] ANGIOACCESS AV FISTULA Left 9/25/2018    SUPERFICIALIZATION AV FISTULA - LEFT ARM performed by Daron Boykin MD at [facility] W/VITRECTOMY ANY METH Left 4/10/2018    PARS PLANA VITRECTOMY 25 GAUGE RETINAL DETACHMENT REPAIR air fluid exchange, endolaser performed by Ileana Rivera MD at [address]      [address]    TRANSESOPHAGEAL ECHOCARDIOGRAM  11/11/2021    Dr. Corrinne Distel TUNNELED VENOUS CATHETER PLACEMENT  11/15/2017    VITRECTOMY Left 04/10/2018    PARS PLANA VITRECTOMY; RETINAL DETACHMENT REPAIR; GAS BUBBLE; LASER LEFT EYE     Social History     Socioeconomic History    Marital status: Single     Spouse name: Not on file    Number of children: Not on file    Years of education: Not on file    Highest education level: Not on file   Occupational History    Not on file   Tobacco Use    Smoking status: Never Smoker    Smokeless tobacco: Never Used   Vaping Use    Vaping Use: Never used   Substance and Sexual Activity    Alcohol use: No    Drug use: No    Sexual activity: Not Currently   Other Topics Concern    Not on file   Social History Narrative    Not on file     Social Determinants of Health     Financial Resource Strain:     Difficulty of Paying Living Expenses: Not on file   Food Insecurity:     Worried About Running Out of Food in the Last Year: Not on file    Fior of Food in the Last Year: Not on file   Transportation Needs:     Lack of Transportation (Medical): Not on file    Lack of Transportation (Non-Medical):  Not on file   Physical Activity:     Days of Exercise per Week: Not on file    Minutes of Exercise per Session: Not on file   Stress:     Feeling of Stress : Not on file   Social Connections:     Frequency of Communication with Friends and Family: Not on file    Frequency of Social Gatherings with Friends and Family: Not on file    Attends [employer]ist Services: Not on file    Active Member of [address] [place] [address] [employer] or Organizations: Not on file    Attends Club or Organization Meetings: Not on file    Marital Status: Not on file   Intimate Partner Violence:     Fear of Current or Ex-Partner: Not on file    Emotionally Abused: Not on file    Physically Abused: Not on file    Sexually Abused: Not on file   Housing Stability:     Unable to Pay for Housing in the Last Year: Not on file    Number of [place] in the Last Year: Not on file    Unstable Housing in the Last Year: Not on file     Family History   Problem Relation Age of Onset    Breast Cancer Mother 61    Hypertension Mother     Heart Disease Father     Prostate Cancer Father     Breast Cancer Maternal Grandmother 61     Allergies   Allergen Reactions    Furosemide Other (See Comments)     States her kidneys shut down  Other reaction(s): Unknown       Current Outpatient Medications:     isosorbide mononitrate (IMDUR) 30 MG extended release tablet, TAKE ONE TABLET BY MOUTH DAILY, Disp: 30 tablet, Rfl: 5    metoprolol succinate (TOPROL XL) 25 MG extended release tablet, Take 1 tablet by mouth daily, Disp: 90 tablet, Rfl: 1    lidocaine (LIDODERM) 5 %, Place 1 patch onto the skin daily 12 hours on, 12 hours off., Disp: 7 patch, Rfl: 0    gabapentin (NEURONTIN) 100 MG capsule, Take 1 capsule by mouth 3 times daily for 180 days.  Intended supply: 30 days, Disp: 90 capsule, Rfl: 5    VALTREX 500 MG tablet, Take 1 tablet by mouth daily, Disp: 7 tablet, Rfl: 0    bumetanide (BUMEX) 2 MG tablet, take 1 tablet by mouth 3 times a week on sunday, tuesday and thursday, Disp: 60 tablet, Rfl: 0    lanthanum (FOSRENOL) 1000 MG chewable tablet, CHEW AND SWALLOW ONE TABLET BY MOUTH THREE TIMES DAILY WITH MEALS, Disp: , Rfl:     allopurinol (ZYLOPRIM) 100 MG tablet, Take 1 tablet by mouth daily, Disp: 90 tablet, Rfl: 1    atorvastatin (LIPITOR) 40 MG tablet, Take 1 tablet by mouth daily, Disp: 90 tablet, Rfl: 1    Insulin Pen Needle (ULTICARE MINI PEN NEEDLES) 31G X 6 MM MISC, USE 1 PEN NEEDLE 4 times daily, Disp: 100 each, Rfl: 5    blood glucose test strips (ASCENSIA AUTODISC VI;ONE TOUCH ULTRA TEST VI) strip, 1 each by In Vitro route 2 times daily, Disp: 200 each, Rfl: 5    ticagrelor (BRILINTA) 90 MG TABS tablet, Take 1 tablet by mouth 2 times daily, Disp: 180 tablet, Rfl: 1    aspirin 81 MG EC tablet, Take 1 tablet by mouth daily, Disp: 30 tablet, Rfl: 3    hydrALAZINE (APRESOLINE) 10 MG tablet, Take 1 tablet by mouth every 8 hours (Patient taking differently: Take 10 mg by mouth 2 times daily ), Disp: 270 tablet, Rfl: 1    midodrine (PROAMATINE) 5 MG tablet, Take 1 tablet by mouth as needed (may repeat x1 durring HD for assymptomatic SBP<100), Disp: 90 tablet, Rfl: 0    B Complex-C-Folic Acid (BONI CAPS) 1 MG CAPS, Take 1 mg by mouth daily, Disp: , Rfl:     insulin glargine (LANTUS SOLOSTAR) 100 UNIT/ML injection pen, Inject 5 Units into the skin nightly, Disp: , Rfl:     omeprazole (PRILOSEC) 20 MG delayed release capsule, Take 20 mg by mouth nightly, Disp: , Rfl:     LUMIGAN 0.01 % SOLN ophthalmic drops, Place 1 drop into the right eye nightly , Disp: , Rfl:     COMBIGAN 0.2-0.5 % ophthalmic solution, Place 1 drop into the right eye every 12 hours , Disp: , Rfl: 7    Review of Systems:  Constitutional: Denies fevers, chills, or weight loss. HEENT: Denies visual changes or hearing loss. Heart: As per HPI. Lungs: Denies shortness of breath, cough, or wheezing. Gastrointestinal: Denies nausea, vomiting, constipation, or diarrhea. Genitourinary: dysuria or hematuria. Psychiatric: Patient denies anxiety or depression. Neurologic: Patient denies weakness of the extremities, dizziness, or headaches. All other ROS checked and found to be negative. Objective: There were no vitals filed for this visit. General Appearance: Appears stated age. Communicates well, no acute distress. HEENT: Head is normocephalic, atraumatic. EOMs intact, PERRL. Trachea midline. Lungs: Normal respiratory rate and normal effort. Not in respiratory distress. Breath sounds clear to auscultation. No wheezes. Heart: normal rate and regular rhythm, normal heart sounds. Chest: Symmetric chest wall expansion. Extremities: Normal range of motion. Neurological: Patient is alert and oriented to person, place and time. Patient has normal reflexes. Skin: Warm and dry. Abdomen: Abdomen is soft and non-distended.  Bowel sounds are normal. There is no abdominal tenderness tenderness. There is no guarding. There is no mass. Pulses: Distal pulses are intact. Skin: Warm and dry without lesions.     ASSESSMENT:  Patient Active Problem List   Diagnosis    Diabetic retinopathy ([address]    Malignant neoplasm of right female breast ([address]    Atherosclerosis of native coronary artery of native heart without angina pectoris    Moderate obesity    Left ventricular hypertrophy    Herniated lumbar intervertebral disc    Lumbar degenerative disc disease    Pseudomeningocele    Lumbar radiculopathy    Lymphedema of arm    CKD (chronic kidney disease) stage 4, GFR 15-29 ml/min ([facility])    Insulin dependent type 2 diabetes mellitus ([facility])    Anemia of chronic disease    Chronic diastolic CHF (congestive heart failure) ([facility])    Neuropathy    Hypertension    Glaucoma    Refusal of blood product    Pancytopenia ([facility])    Controlled type 2 diabetes mellitus with chronic kidney disease on chronic dialysis, with long-term current use of insulin ([facility])    Vitreous hemorrhage ([address]    Patient is Nondenominational    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 ([address]    Hyperkalemia, diminished renal excretion    Chronic pain syndrome    Lumbar post-laminectomy syndrome    Myalgia    Cervicalgia    Diabetic peripheral neuropathy ([facility])    ESRD (end stage renal disease) ([address]    Bilateral carpal tunnel syndrome    Spinal stenosis of lumbar region with neurogenic claudication    Cardiac arrest ([address]    ESRD (end stage renal disease) on dialysis ([address]    Mixed hyperlipidemia    Lymphedema of right upper extremity    Coronary artery disease involving native coronary artery of native heart with angina pectoris ([address]    Ventricular tachycardia ([address]    Mitral valve disease    CAD in native artery       PLAN:  Will plan on CABG x2 (SVG-RCA, SVG-LCx/OM), excision of MV mass, possible MV repair 12/29  Discussed with patient and family regarding increased risk given significant MV CAD, STS mortality 3.8%, morbidity 29%; they all voiced understanding  Needs dental clearance prior to OR  Will need Epogen shots prior to surgery by PCP   Follow up US thyroid for thyroid nodule - defer to PCP   Follow up CT Chest for lung nodules - defer to PCP   Pre-op imaging complete

## 2021-12-14 NOTE — LETTER
[address] [facility]  [address] [place]. [address] [place] [address]  Phone: [phone]  Fax: [phone]    Minor Haro,     December 14, 2021     Anne Rucker, [address]    Patient: Elmer Johnson   MR Number: [id number]   YOB: 1955   Date of Visit: 12/14/2021       Dear Anne Rucker:    Thank you for referring Queenie Mullen to me for evaluation/treatment. Below are the relevant portions of my assessment and plan of care. Subjective:      Patient ID: Elmer Johnson is a 73 y.o. female.          Chief Complaint   Patient presents with    Follow-up       discuss surgery         HPI   This is a 73year old female who presents to the office today to further discuss surgery/evaluate papillary fibroelastoma. PMH includes breast cancer, CAD, CHF, DM, HTN, HLF, and ESRD on dialysis. Since her last visit, the patient underwent KIARA. KIARA revealed normal EF, and a structurally normal anterior mitral leaflet with echodensity attached to the ventricular side of the posterior leaflet (1.0x 1.2cm) with restriction excursion. She also underwent LHC, which revealed MVCAD. She denies CP, SOB, palpitations.     Review of Systems   Constitutional: Negative for chills, diaphoresis and fatigue. Respiratory: Negative for cough, chest tightness, shortness of breath and wheezing. Cardiovascular: Negative for chest pain, palpitations and leg swelling. Neurological: Negative for dizziness, syncope, weakness and light-headedness.         Objective:   Physical Exam  Constitutional:       Appearance: Normal appearance. Cardiovascular:      Rate and Rhythm: Normal rate and regular rhythm. Pulses: Normal pulses. Heart sounds: Normal heart sounds. Pulmonary:      Effort: Pulmonary effort is normal.      Breath sounds: Normal breath sounds. Abdominal:      Palpations: Abdomen is soft. Skin:     General: Skin is warm and dry. Neurological:      Mental Status: She is alert and oriented to person, place, and time. Psychiatric:         Mood and Affect: Mood normal.         Behavior: Behavior normal.         [facility]   Aly Quiñonez  [facility], [address] [place] [address]                             CARDIAC CATHETERIZATION     PATIENT NAME: Aislinn Sandoval                    :          MED REC NO:   [id number]                            RZT  ACCOUNT NO:   [id number]                           ADMIT DATE: 2021  PROVIDER: Shell Malave MD     DATE OF PROCEDURE:  2021     PROCEDURES:  1.  Coronary angiography. 2.  Conscious sedation using Versed and fentanyl.     Indication #70, score of 7.     INDICATION FOR THE PROCEDURE:  The patient is a 52-year-old female with  history of CAD, who is having mitral valve surgery.  The patient is here  for coronary angiography to evaluate her coronary anatomy prior to  cardiac surgery.     DESCRIPTION OF PROCEDURE:  After the appropriate informed consent  obtained, the right wrist area was prepped and draped in the usual  sterile fashion.  A timeout was called.  The right wrist area was  locally anesthetized with 10 mL of 2% lidocaine.  A 21-gauge needle was  used to access the right radial artery.  A 6-Venezuelan introducer sheath  was used to cannulate the right radial artery.  A 6-Venezuelan JL3.5 and  6-Venezuelan JR4 catheters were used for coronary angiography in multiple  projections.     ANGIOGRAPHIC FINDINGS:  1.  The left main coronary artery arises normally from the left sinus of  Valsalva.  It is a large vessel without any disease.  There is mild  distal calcification.  It bifurcates into left anterior descending  artery and left circumflex artery.   2.  The left anterior descending artery has moderate-to-severe proximal  and ostial calcifications.  There is 20% ostial disease.  The rest of  the vessel is mildly diffusely diseased. Mallory Pod is patent stent in the  mid segment.  The LAD gives off a few small diagonal branches.  The  second one is totally occluded.  The rest are mildly diseased. 3.  The left circumflex artery is a large vessel that has total  occlusion after the takeoff of the second OM branch.  The first OM  branch is a mid size vessel that is mildly diseased.  The second one is  the small that has chronic total occlusion with a faint left-to-left  filling collaterals. 4.  The right coronary artery has inferior takeoff.  There are stents in  the mid and proximal segments.  There is 80% in-stent stenosis in the  mid segment.  The rest of the vessel has luminal irregularities.  It  gives off good size right PDA and good size right PLV that has no  significant CAD noted.  There is significant ostial RCA also noted as  evident by damping of pressure on engagement and lack of contrast  reflux.     At the end of the procedure, the catheter and the wire were pulled out  from the body.  The sheath was pulled out from the body and a Vasc Band  was applied to the right wrist area with effective hemostasis.     COMPLICATIONS:  None.     ESTIMATED TOTAL BLOOD LOSS:  15 to 20 mL.     MEDICATIONS USED DURING THE PROCEDURE:  We used intraarterial verapamil  to prevent vasospasm.  We used intraarterial heparin for  anticoagulation.     CONSCIOUS SEDATION:  We used Versed and fentanyl for conscious sedation. First dose was given at 09:42, procedure ended at 10:05. Mallory Pod was 22  minutes of direct face-to-face supervision during conscious sedation  administration.     Total contrast used was 40 mL of Isovue.     Total fluoroscopy time was 1.9 minutes.     IMPRESSION:  1.  Mild disease involving left anterior descending artery with a patent  stent in the mid segment. 2.  Totally occluded left circumflex artery. 3.  Total occlusion of the second OM branch with left-to-left  collaterals.   4.  Significant ostial RCA and significant mid RCA disease as mentioned  above.     RECOMMENDATIONS:  Continue current treatment as per CT Surgery.     Assessment:   73year old female who presents to the office today to further discuss surgery/evaluate papillary fibroelastoma. Since last visit underwent LHC which demonstrated severe MV CAD. Plan: Will plan on CABG x2 (SVG-RCA, SVG-LCx/OM), excision of MV mass, possible MV repair 12/29  Discussed with patient and family regarding increased risk given significant MV CAD, STS mortality 3.8%, morbidity 29%; they all voiced understanding  Needs dental clearance prior to OR  Will need Epogen shots prior to surgery by PCP   Follow up US thyroid for thyroid nodule - defer to PCP   Follow up CT Chest for lung nodules - defer to PCP   Pre-op imaging complete          If you have questions, please do not hesitate to call me. I look forward to following Claudia along with you.     Sincerely,      Luis Antonio Brown, DO

## 2021-12-15 ENCOUNTER — TELEPHONE (OUTPATIENT)
Dept: CARDIOTHORACIC SURGERY | Age: 66
End: 2021-12-15

## 2021-12-15 NOTE — TELEPHONE ENCOUNTER
Pt informed PAT 12/27 arrive 8 AM  Hold Theodore 12/23  Patient is scheduled for dental appointment 12/21. Will have office fax clearance.

## 2021-12-17 VITALS — HEIGHT: 70 IN | BODY MASS INDEX: 25.91 KG/M2 | WEIGHT: 181 LBS

## 2021-12-17 NOTE — PROGRESS NOTES
[address] [facility] PRE-ADMISSION TESTING GENERAL INSTRUCTIONS- [facility]-phone number:[phone]    GENERAL INSTRUCTIONS  [x] Nothing by mouth after midnight, including gum, candy, mints, or water. [x] You may brush your teeth, gargle, but do NOT swallow water. [x] Antibacterial Soap shower Night before AND AM of Surgery  [x]Hibiclens shower  the night before and the morning of surgery. Do not use Hibiclens on your face or head. [x]No smoking, chewing tobacco, illegal drugs, or alcohol within 24 hours of your surgery. [x] Jewelry, valuables or body piercing's should not be brought to the hospital. All body and/or tongue piercing's must be removed prior to arriving to hospital.  ALL hair pins must be removed. [x] Do not wear makeup, lotions, powders, deodorant. [x] Arrange transportation with a responsible adult  to and from the hospital.  Who will be your  for transportation? Sister- Kristofer Holcomb  [x] Bring insurance card and photo ID. [x] Transfusion Bracelet: Please bring with you to hospital, day of surgery  [x] Bring copy of living will or healthcare power of  papers to be placed in your electronic record. PARKING INSTRUCTIONS:   [x] Arrival Time: 12/29/21 @ 5:00AM. [facility] entrance. 1 person may assist you. Masls required. · [x] Parking lot '\"I\"  is located on [facility] (the corner of [facility] and [facility]). To enter, press the button and the gate will lift. A free token will be provided to exit the lot. One car per patient is allowed to park in this lot. All other cars are to park on [address] [place] [address] either in the parking garage or the handicap lot. EDUCATION INSTRUCTIONS:      [x] Pre-admission Testing educational folder given  [x] Incentive Spirometry,coughing & deep breathing exercises reviewed. [x]Medication information sheet(s)    [x]Pain: Post-op pain is normal and to be expected.   You will be asked to rate your pain from 0-10(a zero is not acceptable-education is needed). Your post-op pain goal is:  [x] Ask your nurse for your pain medication. MEDICATION INSTRUCTIONS:   [x]Bring a complete list of your medications, please write the last time you took the medicine, give this list to the nurse. [x] Take the following medications the morning of surgery with 1-2 ounces of water:    -Aspirin, Neurontin, Hydralazine & 1/2 your dose of Toprol-XL = 12.5mg.    -Take 1/2 dose of Lantus the NIGHT before surgery. -DO NOT TAKE YOUR IMDUR THAT MORNING.    -Last dose of Brilinta 12/22/21. [x] Stop herbal supplements and vitamins 5 days before your surgery. [x] DO NOT take any diabetic medicine the morning of surgery. Follow instructions for insulin the day before surgery. [x] If you are diabetic and your blood sugar is low or you feel symptomatic, you may drink 1-2 ounces of apple juice or take a glucose tablet. The morning of your procedure, you may call the pre-op area if you have concerns about your blood sugar [phone]. [x] Follow physician instructions regarding any blood thinners you may be taking. WHAT TO EXPECT:  [x] The day of surgery you will be greeted and checked in by the [employer].  In addition, you will be registered in the [facility] by a Patient Access Representative. Please bring your photo ID and insurance card. A nurse will greet you in accordance to the time you are needed in the pre-op area to prepare you for surgery. Please do not be discouraged if you are not greeted in the order you arrive as there are many variables that are involved in patient preparation. Your patience is greatly appreciated as you wait for your nurse. Please bring in items such as: books, magazines, newspapers, electronics, or any other items  to occupy your time in the waiting area. [x]  Delays may occur with surgery and staff will make a sincere effort to keep you informed of delays.   If any delays occur with your procedure, we apologize ahead of time for your inconvenience as we recognize the value of your time.

## 2021-12-20 ENCOUNTER — HOSPITAL ENCOUNTER (EMERGENCY)
Age: 66
Discharge: ANOTHER HOSPITAL | End: 2021-12-20
Payer: COMMERCIAL

## 2021-12-20 ENCOUNTER — APPOINTMENT (OUTPATIENT)
Dept: GENERAL RADIOLOGY | Age: 66
End: 2021-12-20
Payer: COMMERCIAL

## 2021-12-20 VITALS
RESPIRATION RATE: 20 BRPM | DIASTOLIC BLOOD PRESSURE: 69 MMHG | TEMPERATURE: 97.4 F | SYSTOLIC BLOOD PRESSURE: 161 MMHG | OXYGEN SATURATION: 100 % | HEART RATE: 59 BPM

## 2021-12-20 DIAGNOSIS — L03.012 FELON OF FINGER OF LEFT HAND: Primary | ICD-10-CM

## 2021-12-20 LAB
ANION GAP SERPL CALCULATED.3IONS-SCNC: 9 MMOL/L (ref 7–16)
BASOPHILS ABSOLUTE: 0.02 E9/L (ref 0–0.2)
BASOPHILS RELATIVE PERCENT: 0.6 % (ref 0–2)
BUN BLDV-MCNC: 14 MG/DL (ref 6–23)
CALCIUM SERPL-MCNC: 9.4 MG/DL (ref 8.6–10.2)
CHLORIDE BLD-SCNC: 99 MMOL/L (ref 98–107)
CO2: 31 MMOL/L (ref 22–29)
CREAT SERPL-MCNC: 3.1 MG/DL (ref 0.5–1)
EOSINOPHILS ABSOLUTE: 0.21 E9/L (ref 0.05–0.5)
EOSINOPHILS RELATIVE PERCENT: 5.9 % (ref 0–6)
GFR AFRICAN AMERICAN: 18
GFR NON-AFRICAN AMERICAN: 18 ML/MIN/1.73
GLUCOSE BLD-MCNC: 96 MG/DL (ref 74–99)
HCT VFR BLD CALC: 30.3 % (ref 34–48)
HEMOGLOBIN: 9.8 G/DL (ref 11.5–15.5)
IMMATURE GRANULOCYTES #: 0.03 E9/L
IMMATURE GRANULOCYTES %: 0.8 % (ref 0–5)
LYMPHOCYTES ABSOLUTE: 0.81 E9/L (ref 1.5–4)
LYMPHOCYTES RELATIVE PERCENT: 22.7 % (ref 20–42)
MCH RBC QN AUTO: 29.4 PG (ref 26–35)
MCHC RBC AUTO-ENTMCNC: 32.3 % (ref 32–34.5)
MCV RBC AUTO: 91 FL (ref 80–99.9)
MONOCYTES ABSOLUTE: 0.6 E9/L (ref 0.1–0.95)
MONOCYTES RELATIVE PERCENT: 16.8 % (ref 2–12)
NEUTROPHILS ABSOLUTE: 1.9 E9/L (ref 1.8–7.3)
NEUTROPHILS RELATIVE PERCENT: 53.2 % (ref 43–80)
PDW BLD-RTO: 16.4 FL (ref 11.5–15)
PLATELET # BLD: 245 E9/L (ref 130–450)
PMV BLD AUTO: 9.8 FL (ref 7–12)
POTASSIUM SERPL-SCNC: 4.2 MMOL/L (ref 3.5–5)
RBC # BLD: 3.33 E12/L (ref 3.5–5.5)
SEDIMENTATION RATE, ERYTHROCYTE: 30 MM/HR (ref 0–20)
SODIUM BLD-SCNC: 139 MMOL/L (ref 132–146)
WBC # BLD: 3.6 E9/L (ref 4.5–11.5)

## 2021-12-20 PROCEDURE — 99284 EMERGENCY DEPT VISIT MOD MDM: CPT

## 2021-12-20 PROCEDURE — 86140 C-REACTIVE PROTEIN: CPT

## 2021-12-20 PROCEDURE — 87186 SC STD MICRODIL/AGAR DIL: CPT

## 2021-12-20 PROCEDURE — 96374 THER/PROPH/DIAG INJ IV PUSH: CPT

## 2021-12-20 PROCEDURE — 80048 BASIC METABOLIC PNL TOTAL CA: CPT

## 2021-12-20 PROCEDURE — 36415 COLL VENOUS BLD VENIPUNCTURE: CPT

## 2021-12-20 PROCEDURE — 87077 CULTURE AEROBIC IDENTIFY: CPT

## 2021-12-20 PROCEDURE — 6370000000 HC RX 637 (ALT 250 FOR IP): Performed by: PHYSICIAN ASSISTANT

## 2021-12-20 PROCEDURE — 2580000003 HC RX 258: Performed by: NURSE PRACTITIONER

## 2021-12-20 PROCEDURE — 87070 CULTURE OTHR SPECIMN AEROBIC: CPT

## 2021-12-20 PROCEDURE — 85651 RBC SED RATE NONAUTOMATED: CPT

## 2021-12-20 PROCEDURE — 85025 COMPLETE CBC W/AUTO DIFF WBC: CPT

## 2021-12-20 PROCEDURE — 73140 X-RAY EXAM OF FINGER(S): CPT

## 2021-12-20 PROCEDURE — 6360000002 HC RX W HCPCS: Performed by: NURSE PRACTITIONER

## 2021-12-20 RX ORDER — SULFAMETHOXAZOLE AND TRIMETHOPRIM 800; 160 MG/1; MG/1
1 TABLET ORAL ONCE
Status: COMPLETED | OUTPATIENT
Start: 2021-12-20 | End: 2021-12-20

## 2021-12-20 RX ORDER — SULFAMETHOXAZOLE AND TRIMETHOPRIM 800; 160 MG/1; MG/1
1 TABLET ORAL 2 TIMES DAILY
Qty: 20 TABLET | Refills: 0 | Status: SHIPPED | OUTPATIENT
Start: 2021-12-20 | End: 2021-12-30

## 2021-12-20 RX ADMIN — WATER 1000 MG: 1 INJECTION INTRAMUSCULAR; INTRAVENOUS; SUBCUTANEOUS at 14:01

## 2021-12-20 RX ADMIN — SULFAMETHOXAZOLE AND TRIMETHOPRIM 1 TABLET: 800; 160 TABLET ORAL at 21:29

## 2021-12-20 NOTE — ED PROVIDER NOTES
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 Surgical History:  has a past surgical history that includes Cholecystectomy; amputation; Mastectomy; Cystoscopy; Cataract removal; Ankle surgery; other surgical history (9/27/2011); ECHO Compl W Dop Color Flow (2/14/2013); Echo Complete (9/17/2013); spinal cord decompression; other surgical history (insertion lumbar drain insertion); other surgical history (10/22/15); other surgical history (11/03/2015); Tunneled venous catheter placement (11/15/2017); Dialysis fistula creation (Left, 01/31/2018); vitrectomy (Left, 04/10/2018); pr rpr retinal dtchmnt w/vitrectomy any meth (Left, 4/10/2018); pr av anast,up arm basilic vein transposit (Left, 5/15/2018); pr ligatn angioaccess av fistula (Left, 9/25/2018); Colonoscopy; Carpal tunnel release (Left, 3/17/2020); transesophageal echocardiogram (11/11/2021); and Cardiac catheterization (12/09/2021). Social History:  reports that she has never smoked. She has never used smokeless tobacco. She reports that she does not drink alcohol and does not use drugs. Family History: family history includes Breast Cancer (age of onset: 61) in her maternal grandmother and mother; Heart Disease in her father; Hypertension in her mother; Prostate Cancer in her father. Allergies: Furosemide    Physical Exam   Oxygen Saturation Interpretation: Normal.        ED Triage Vitals [12/20/21 1142]   BP Temp Temp src Pulse Resp SpO2 Height Weight   (!) 112/52 97.7 °F (36.5 °C) -- 59 12 94 % -- --         · Constitutional:  Alert, development consistent with age. · HEENT:  NC/NT. No outward sign of trauma. Airway patent. · Neck:  Normal ROM. Supple. · Respiratory: No respiratory distress or increased work of breathing. · Cardiovascular: Regular rate and rhythm.  Strong distal pulses. · Left Upper Extremity(s): Middle finger               Tenderness: Diffuse tenderness through the distal aspect of the finger with the pulp space firm and tender, the nail is loose and lifted with discoloration, no spontaneous drainage. There is no bony tenderness through the other fingers or through the hand. Swelling: Mild generalized swelling of the hand compartments soft, moderate swelling at the distal aspect of the middle finger with compartment of the distal phalanx firm. Deformity: no deformity observed/palpated. ROM: full range with pain. Skin: Abscess to the distal phalanx of the middle finger with erythema. No lymphangitis. Neurovascular: Motor deficit: none. Sensory deficit: none. Pulse deficit: none. Capillary refill: normal.  · Neurological:  Alert and oriented. Motor and sensory functions intact unless otherwise described above.               Lab / Imaging Results   (All laboratory and radiology results have been personally reviewed by myself)  Labs:  Results for orders placed or performed during the hospital encounter of 12/20/21   CBC Auto Differential   Result Value Ref Range    WBC 3.6 (L) 4.5 - 11.5 E9/L    RBC 3.33 (L) 3.50 - 5.50 E12/L    Hemoglobin 9.8 (L) 11.5 - 15.5 g/dL    Hematocrit 30.3 (L) 34.0 - 48.0 %    MCV 91.0 80.0 - 99.9 fL    MCH 29.4 26.0 - 35.0 pg    MCHC 32.3 32.0 - 34.5 %    RDW 16.4 (H) 11.5 - 15.0 fL    Platelets 791 528 - 662 E9/L    MPV 9.8 7.0 - 12.0 fL    Neutrophils % 53.2 43.0 - 80.0 %    Immature Granulocytes % 0.8 0.0 - 5.0 %    Lymphocytes % 22.7 20.0 - 42.0 %    Monocytes % 16.8 (H) 2.0 - 12.0 %    Eosinophils % 5.9 0.0 - 6.0 %    Basophils % 0.6 0.0 - 2.0 %    Neutrophils Absolute 1.90 1.80 - 7.30 E9/L    Immature Granulocytes # 0.03 E9/L    Lymphocytes Absolute 0.81 (L) 1.50 - 4.00 E9/L    Monocytes Absolute 0.60 0.10 - 0.95 E9/L    Eosinophils Absolute 0.21 0.05 - 0.50 E9/L    Basophils Absolute 0.02 0.00 - 0.20 Z9/O   Basic Metabolic Panel   Result Value Ref Range    Sodium 139 132 - 146 mmol/L    Potassium 4.2 3.5 - 5.0 mmol/L    Chloride 99 98 - 107 mmol/L    CO2 31 (H) 22 - 29 mmol/L    Anion Gap 9 7 - 16 mmol/L    Glucose 96 74 - 99 mg/dL    BUN 14 6 - 23 mg/dL    CREATININE 3.1 (H) 0.5 - 1.0 mg/dL    GFR Non-African American 18 >=60 mL/min/1.73    GFR African American 18     Calcium 9.4 8.6 - 10.2 mg/dL   Sedimentation Rate   Result Value Ref Range    Sed Rate 30 (H) 0 - 20 mm/Hr       Imaging: All Radiology results interpreted by Radiologist unless otherwise noted. XR FINGER LEFT (MIN 2 VIEWS)   Final Result   No acute abnormality involving the left 3rd digit. ED Course / Medical Decision Making     Medications   ceFAZolin (ANCEF) 1,000 mg in sterile water 10 mL IV syringe (1,000 mg IntraVENous Given 12/20/21 1401)          Re-examination:  12/20/21     Time: 1510  Patients symptoms show no change. Repeat physical examination is unchanged. Discussed results and treatment plan for transfer to [facility]. Consult:   IP CONSULT TO ORTHOPEDIC SURGERY  IP CONSULT TO ORTHOPEDIC SURGERY    Time: 1890 Discussed case with Dr. Yared Palumbo and given patient is one day off Brilinta and her complex medical history, plan is for transfer to [facility] for orthopedic evaluation and I&D. Time: 1 Dr. Cope Last aware of patient acceptance by ortho for ER to ER transfer. Procedure(s):  None    MDM:    Neurovascularly intact. Tetanus up-to-date. Patient's exam is concerning for a felon of the left middle finger. She has been off Brilinta for 1 day otherwise is not on any anticoagulants. Patient evaluated by Dr. Gladys Hinojosa as well which her recommendation is for ortho consult for I&D.  X-ray and labs with consultation to orthopedics and plan is for transfer to [facility] for orthopedics evaluation for further treatment. Plan of Care/Counseling:  GOYO Vasquez CNP reviewed today's visit with the patient in addition to providing specific details for the plan of care and counseling regarding the diagnosis and prognosis. Questions are answered at this time and are agreeable with the plan. Assessment      1. Felon of finger of left hand      Plan   Transferred to [facility]. Patient condition is stable    New Medications     New Prescriptions    No medications on file     Electronically signed by GOYO Vasquez CNP   DD: 12/20/21  **This report was transcribed using voice recognition software. Every effort was made to ensure accuracy; however, inadvertent computerized transcription errors may be present.   END OF ED PROVIDER NOTE        GOYO Vasquez CNP  12/20/21 [id number]

## 2021-12-20 NOTE — Clinical Note
Sancho Perkins was seen and treated in our emergency department on 12/20/2021. She may return to work on 12/21/2021. If you have any questions or concerns, please don't hesitate to call.       Shelly Esposito PA-C

## 2021-12-20 NOTE — ED NOTES
Patient agreeable to transport to [facility] to be seen by ortho. MLP spoke with ED physician and ortho specialist. Patient did request to drive by car, but this is not allowed. Patient is currently comfortable and had #20 angio right antecubital. She was updated transport to be 60 to 90\" and agreeable to this. I informed her I will keep her updated. I gave report to main ED charge nurse, Randa Pete RN.      Celeste Overton RN  12/20/21 [id number]

## 2021-12-20 NOTE — ED NOTES
Patient medicated per orders. Patient denies pain at this time.       Sabiha Motley RN  12/20/21 5669

## 2021-12-21 LAB — C-REACTIVE PROTEIN: 1.6 MG/DL (ref 0–0.4)

## 2021-12-21 NOTE — ED PROVIDER NOTES
[address]  Department of Emergency Medicine   ED  Encounter Note  Admit Date/RoomTime: 2021 12:04 PM  ED Room: [facility]    NAME: Katerina Zepeda  : 1955  MRN: [id number]     Chief Complaint:  Wound Check (left middle finger swollen and painful, tx from [place] to see ortho)    History of Present Illness        Katerina Zepeda is a 73 y.o. old female presenting to the emergency department by ambulance, for non-traumatic Left Middle Finger pain which occured several day(s) prior to arrival.  The complaint is due to no known cause. Since onset the symptoms have been persistent and worsening. Patient has no prior history of pain/injury with regards to today's visit. Her pain is aggraveated by any movement or palpation and relieved by nothing. She denies any f/c/s, n/v/d, cp, sob. She states that her primary reason for getting seen was because she did not want this to interfere with her heart surgery next week. ROS   Pertinent positives and negatives are stated within HPI, all other systems reviewed and are negative. 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 Surgical History:  has a past surgical history that includes Cholecystectomy; amputation; Mastectomy;  Cystoscopy; Cataract removal; Ankle surgery; other surgical history (9/27/2011); ECHO Compl W Dop Color Flow (2/14/2013); Echo Complete (9/17/2013); spinal cord decompression; other surgical history (insertion lumbar drain insertion); other surgical history (10/22/15); other surgical history (11/03/2015); Tunneled venous catheter placement (11/15/2017); Dialysis fistula creation (Left, 01/31/2018); vitrectomy (Left, 04/10/2018); pr rpr retinal dtchmnt w/vitrectomy any meth (Left, 4/10/2018); pr av anast,up arm basilic vein transposit (Left, 5/15/2018); pr ligatn angioaccess av fistula (Left, 9/25/2018); Colonoscopy; Carpal tunnel release (Left, 3/17/2020); transesophageal echocardiogram (11/11/2021); and Cardiac catheterization (12/09/2021). Social History:  reports that she has never smoked. She has never used smokeless tobacco. She reports that she does not drink alcohol and does not use drugs. Family History: family history includes Breast Cancer (age of onset: 61) in her maternal grandmother and mother; Heart Disease in her father; Hypertension in her mother; Prostate Cancer in her father. Allergies: Furosemide    Physical Exam   Oxygen Saturation Interpretation: Normal.        ED Triage Vitals   BP Temp Temp Source Pulse Resp SpO2 Height Weight   12/20/21 1142 12/20/21 1142 12/20/21 1541 12/20/21 1142 12/20/21 1142 12/20/21 1142 -- --   (!) 112/52 97.7 °F (36.5 °C) Oral 59 12 94 %           Constitutional:  Alert, development consistent with age. Neck:  Normal ROM. Supple. Non-tender. Fingers:  Left Middle finger pulp space            Tenderness:  severe. Swelling: Severe. Deformity: no deformity observed/palpated. ROM: full range of motion. Skin:  Suspected felon. Neurovascular: Motor deficit: none. Sensory deficit:   none. Pulse deficit: none.   This was confirmed with doppler ultrasound            Capillary refill: normal.  Left Hand: all metacarpals. Tenderness: none. Swelling: None. Deformity: no.             Skin:  no wounds, erythema, or swelling. Left Wrist:               Tenderness:  none. Swelling: None. Deformity: no deformity observed/palpated. ROM: full range of motion. Skin:  no wounds, erythema, or swelling. Lymphatics: No lymphangitis or adenopathy noted. Neurological:  Oriented. Motor functions intact. t.       Lab / Imaging Results   (All laboratory and radiology results have been personally reviewed by myself)  Labs:  Results for orders placed or performed during the hospital encounter of 12/20/21   CBC Auto Differential   Result Value Ref Range    WBC 3.6 (L) 4.5 - 11.5 E9/L    RBC 3.33 (L) 3.50 - 5.50 E12/L    Hemoglobin 9.8 (L) 11.5 - 15.5 g/dL    Hematocrit 30.3 (L) 34.0 - 48.0 %    MCV 91.0 80.0 - 99.9 fL    MCH 29.4 26.0 - 35.0 pg    MCHC 32.3 32.0 - 34.5 %    RDW 16.4 (H) 11.5 - 15.0 fL    Platelets 786 910 - 251 E9/L    MPV 9.8 7.0 - 12.0 fL    Neutrophils % 53.2 43.0 - 80.0 %    Immature Granulocytes % 0.8 0.0 - 5.0 %    Lymphocytes % 22.7 20.0 - 42.0 %    Monocytes % 16.8 (H) 2.0 - 12.0 %    Eosinophils % 5.9 0.0 - 6.0 %    Basophils % 0.6 0.0 - 2.0 %    Neutrophils Absolute 1.90 1.80 - 7.30 E9/L    Immature Granulocytes # 0.03 E9/L    Lymphocytes Absolute 0.81 (L) 1.50 - 4.00 E9/L    Monocytes Absolute 0.60 0.10 - 0.95 E9/L    Eosinophils Absolute 0.21 0.05 - 0.50 E9/L    Basophils Absolute 0.02 0.00 - 0.20 D9/U   Basic Metabolic Panel   Result Value Ref Range    Sodium 139 132 - 146 mmol/L    Potassium 4.2 3.5 - 5.0 mmol/L    Chloride 99 98 - 107 mmol/L    CO2 31 (H) 22 - 29 mmol/L    Anion Gap 9 7 - 16 mmol/L    Glucose 96 74 - 99 mg/dL    BUN 14 6 - 23 mg/dL    CREATININE 3.1 (H) 0.5 - 1.0 mg/dL    GFR Non-African American 18 >=60 mL/min/1.73    GFR African American 18     Calcium 9.4 8.6 - 10.2 mg/dL Sedimentation Rate   Result Value Ref Range    Sed Rate 30 (H) 0 - 20 mm/Hr       Imaging: All Radiology results interpreted by Radiologist unless otherwise noted. XR FINGER LEFT (MIN 2 VIEWS)   Final Result   No acute abnormality involving the left 3rd digit. ED Course / Medical Decision Making     Medications   ceFAZolin (ANCEF) 1,000 mg in sterile water 10 mL IV syringe (1,000 mg IntraVENous Given 12/20/21 1401)        Consult(s):   IP CONSULT TO ORTHOPEDIC SURGERY  IP CONSULT TO ORTHOPEDIC SURGERY I spoke with Dr. Sheila Couch who saw the patient in the ED. He drained the pulp space infection and stated that she could be discharged with Bactrim. Follow up with Dr. Sadie Gao. Procedure(s):  See ortho note for incision and drainage of felon. MDM:      Patient presents to the emergency room by ambulance for Ortho consult. An orthopedic resident saw the patient in the emergency department and drained her felon. Per Ortho, Bactrim twice daily for 10 days and have her follow-up with her orthopedic surgeon. Patient educated on new prescriptions. Return to the emergency room for new or worsening symptoms. Plan of Care/Counseling:  Denise Lopez PA-C reviewed today's visit with the patient in addition to providing specific details for the plan of care and counseling regarding the diagnosis and prognosis. Questions are answered at this time and are agreeable with the plan. Assessment      1. Felon of finger of left hand      Plan   Discharged home. Patient condition is good    New Medications     New Prescriptions    SULFAMETHOXAZOLE-TRIMETHOPRIM (BACTRIM DS) 800-160 MG PER TABLET    Take 1 tablet by mouth 2 times daily for 10 days     Electronically signed by Denise Lopez PA-C   DD: 12/20/21  **This report was transcribed using voice recognition software. Every effort was made to ensure accuracy; however, inadvertent computerized transcription errors may be present.   END OF ED PROVIDER

## 2021-12-21 NOTE — CONSULTS
Department of Orthopedic Surgery  Resident Consult Note          Reason for Consult:  Left middle finger infection    HISTORY OF PRESENT ILLNESS:      78-year-old female presents emergency department by ambulance as a transfer from [facility]. Patient is accompanied by her niece who is an employee at [employer]. Patient presented [place] with pain in the left middle finger that started last night. Patient indicated she arrived in the emergency department around 11 AM in which was provided IV antibiotics. No blood cultures or wound cultures were ordered at that time. Patient reports mild pain at the tip of her left middle finger that is nonradiating and dull in nature. Patient reports clear drainage from beneath the fingernail starting this morning. Orthopedic surgery was consulted with suspicion of finger infection. Patient denies numbness/tingling/paresthesias. Patient is currently on dialysis and is scheduled for a cardiac bypass next week. Patient does indicate that she had previous neuropathy in upper extremities with mild diminished sensation at the hands. Patient denies fever, chills, nausea, vomiting, chest pain and shortness of breath. No other complaints at this time. Patient does not have an established orthopedic surgeon. Past Medical History:        Diagnosis Date    Acute infection of bone ([address]     infection of rt foot, resolved.     Anemia of chronic disease     Arthritis     Breast cancer ([address]     right breast, 2008/ bladder, 2006- last chemotherapy \"years ago\"    CAD (coronary artery disease)     Carpal tunnel syndrome     bilat - for OR left hand 3-17-20     Chronic diastolic CHF (congestive heart failure) ([address] 09/23/2014 9/23/14- echocardiogram revealed moderate LV concentric hypertrophy, stage III diastolic dysfunction, mild tricuspid regurgitation    CKD (chronic kidney disease) stage 4, GFR 15-29 ml/min ([facility])     Diabetic retinopathy ([address]     VITRECTOMY 25 GAUGE RETINAL DETACHMENT REPAIR air fluid exchange, endolaser performed by Thalia Ortega MD at [address] [place] [address] Drive TRANSESOPHAGEAL ECHOCARDIOGRAM  11/11/2021    Dr. Nandini Murphyon TUNNELED VENOUS CATHETER PLACEMENT  11/15/2017    VITRECTOMY Left 04/10/2018    PARS PLANA VITRECTOMY; RETINAL DETACHMENT REPAIR; GAS BUBBLE; LASER LEFT EYE     Current Medications:   No current facility-administered medications for this encounter. Allergies:  Furosemide    Social History:   TOBACCO:   reports that she has never smoked. She has never used smokeless tobacco.  ETOH:   reports no history of alcohol use. DRUGS:   reports no history of drug use. ACTIVITIES OF DAILY LIVING:    OCCUPATION:    Family History:       Problem Relation Age of Onset    Breast Cancer Mother 61    Hypertension Mother     Heart Disease Father     Prostate Cancer Father     Breast Cancer Maternal Grandmother 61       REVIEW OF SYSTEMS:  CONSTITUTIONAL:  negative for  fevers, chills  EYES:  negative for blurred vision, visual disturbance  HEENT:  negative for  hearing loss, voice change  RESPIRATORY:  negative for  dyspnea, wheezing  CARDIOVASCULAR:  negative for  chest pain, palpitations  GASTROINTESTINAL:  negative for nausea, vomiting  GENITOURINARY:  negative for frequency, urinary incontinence  HEMATOLOGIC/LYMPHATIC:  negative for bleeding and petechiae  MUSCULOSKELETAL:  positive for  pain and joint swelling  NEUROLOGICAL:  negative for headaches, dizziness  BEHAVIOR/PSYCH:  negative for increased agitation and anxiety    PHYSICAL EXAM:    VITALS:  BP (!) 161/69   Pulse 59   Temp 97.4 °F (36.3 °C)   Resp 20   SpO2 100%   CONSTITUTIONAL:  awake, alert, cooperative, no apparent distress, and appears stated age  MUSCULOSKELETAL:  Left upper Extremity:  · Skin appears intact. Nailbed loose and not fully adhered to underlying sterile matrix  distally.   · Light serous yellowish fluid drainage beneath small puncture wound was made at the distal aspect of the palmar surface of the middle finger. Bloody drainage was visualized no signs of purulence. Wound was then copiously irrigated with normal saline and sterile hemostat used to break down any loculations which would entrap purulence. Soft dressing was applied to the middle finger and then wrapped with Kerlix.  Wound cultures were obtained and sent to the lab for appropriate studies.  Prescription antibiotics ordered and sent to patient's preferred pharmacy   Patient advised if symptoms worsen or signs of infection to immediately report back into the emergency department.  Continue current over-the-counter pain medication.    Continue ice and elevation to decrease swelling  · Discussed with Dr. Annie Lazar

## 2021-12-24 LAB
ORGANISM: ABNORMAL
ORGANISM: ABNORMAL
WOUND/ABSCESS: ABNORMAL
WOUND/ABSCESS: ABNORMAL

## 2021-12-27 ENCOUNTER — HOSPITAL ENCOUNTER (OUTPATIENT)
Dept: PREADMISSION TESTING | Age: 66
Setting detail: SURGERY ADMIT
Discharge: HOME | End: 2021-12-27
Payer: COMMERCIAL

## 2021-12-27 DIAGNOSIS — I25.10 CAD IN NATIVE ARTERY: ICD-10-CM

## 2021-12-27 DIAGNOSIS — Z01.818 PREOPERATIVE TESTING: ICD-10-CM

## 2021-12-27 LAB
EKG ATRIAL RATE: 60 BPM
EKG P AXIS: 38 DEGREES
EKG P-R INTERVAL: 196 MS
EKG Q-T INTERVAL: 432 MS
EKG QRS DURATION: 96 MS
EKG QTC CALCULATION (BAZETT): 432 MS
EKG R AXIS: 28 DEGREES
EKG T AXIS: -156 DEGREES
EKG VENTRICULAR RATE: 60 BPM

## 2021-12-27 PROCEDURE — 93005 ELECTROCARDIOGRAM TRACING: CPT

## 2021-12-28 RX ORDER — INSULIN GLARGINE 100 [IU]/ML
INJECTION, SOLUTION SUBCUTANEOUS
Qty: 15 ML | Refills: 0 | Status: SHIPPED
Start: 2021-12-28 | End: 2022-03-09

## 2022-01-03 ASSESSMENT — ENCOUNTER SYMPTOMS
SHORTNESS OF BREATH: 0
COUGH: 0

## 2022-01-03 NOTE — PROGRESS NOTES
PATIENT WAS NOT SEEN IN CLINIC TODAY DUE TO SENT TO ER.   Subjective:      Patient ID: Satya Aburto is a 73 y.o. female who was previously scheduled for surgery on 12/29 for CABG x 2 and excision on MV mass, poss MV repair, but patient was in ED on 12/20 for finger abscess with I&D, she was placed on abx and surgery was postponed to complete course of abx and re-evaluate finger prior to any surgical intervention   She was initially sent by cardiology for evaluation of her fibroelastoma  PMH is significant for CAD (cath 5/2021 95% mid LAD,  circ, 90% mid RCA, 70% ostial PDA----> s/p MARYLOU mid LAD and mid RCA), CKD on HD, CHF, HTN, DM and has been followed by cardiology.  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  LVEF of 50-55%. KIARA showed Structurally normal anterior mitral leaflet with echodensity attached tot the ventricular side of the posterior leaflet(1.0x 1.2cm) with restriction   excursion, mild MR, mild TR. Yahaira De La Fuente is currently on brilinta. Of note she has history of right breast CA, treated with mastectomy and chemo/rads; she is jehovahs witness.     HPI    Review of Systems   Constitutional: Negative for chills and fever. Respiratory: Negative for cough and shortness of breath. Cardiovascular: Negative for chest pain and palpitations. Neurological: Negative for syncope and light-headedness. Objective:   Physical Exam  Constitutional:       Appearance: Normal appearance. Cardiovascular:      Rate and Rhythm: Normal rate and regular rhythm. Heart sounds: Murmur heard. Pulmonary:      Effort: Pulmonary effort is normal.      Breath sounds: Normal breath sounds. Abdominal:      General: Bowel sounds are normal.   Musculoskeletal:         General: Normal range of motion. Cervical back: Normal range of motion and neck supple.       Comments: Left index finger with approximate 1cm necrotic defect at the level of the nail bed, no purulence   Neurological:      Mental Status: She is alert and oriented to person, place, and time. Assessment:      Elsy López is a 73 y.o. female who was previously scheduled for surgery on 12/29 for CABG x 2 and excision on MV mass, poss MV repair, but patient was in ED on 12/20 for finger abscess with I&D, she was placed on abx and surgery was postponed to complete course of abx and re-evaluate finger prior to any surgical intervention. Plan:        Will plan on CABG x2 (SVG-RCA, SVG-LCx/OM), excision of MV mass, possible MV repair once seen by orthopedics vs dermatology for left index finger, attempted to call office of Dr. Timothy Le, left message   Discussed with patient and family regarding increased risk given significant MV CAD, STS mortality 3.8%, morbidity 29%; they all voiced understanding  Dental clearance report pending  Will need Epogen shots prior to surgery by PCP, attempted to call office and left message   Follow up US thyroid for thyroid nodule - defer to PCP   Follow up CT Chest for lung nodules - defer to PCP   Pre-op imaging complete

## 2022-01-04 ENCOUNTER — OFFICE VISIT (OUTPATIENT)
Dept: CARDIOTHORACIC SURGERY | Age: 67
End: 2022-01-04
Payer: COMMERCIAL

## 2022-01-04 ENCOUNTER — TELEPHONE (OUTPATIENT)
Dept: ORTHOPEDIC SURGERY | Age: 67
End: 2022-01-04

## 2022-01-04 VITALS
BODY MASS INDEX: 25.91 KG/M2 | WEIGHT: 181 LBS | HEART RATE: 61 BPM | DIASTOLIC BLOOD PRESSURE: 57 MMHG | HEIGHT: 70 IN | SYSTOLIC BLOOD PRESSURE: 113 MMHG

## 2022-01-04 DIAGNOSIS — I25.10 CAD IN NATIVE ARTERY: Primary | ICD-10-CM

## 2022-01-04 PROCEDURE — G8427 DOCREV CUR MEDS BY ELIG CLIN: HCPCS | Performed by: STUDENT IN AN ORGANIZED HEALTH CARE EDUCATION/TRAINING PROGRAM

## 2022-01-04 PROCEDURE — G8400 PT W/DXA NO RESULTS DOC: HCPCS | Performed by: STUDENT IN AN ORGANIZED HEALTH CARE EDUCATION/TRAINING PROGRAM

## 2022-01-04 PROCEDURE — 1123F ACP DISCUSS/DSCN MKR DOCD: CPT | Performed by: STUDENT IN AN ORGANIZED HEALTH CARE EDUCATION/TRAINING PROGRAM

## 2022-01-04 PROCEDURE — 3017F COLORECTAL CA SCREEN DOC REV: CPT | Performed by: STUDENT IN AN ORGANIZED HEALTH CARE EDUCATION/TRAINING PROGRAM

## 2022-01-04 PROCEDURE — 99214 OFFICE O/P EST MOD 30 MIN: CPT | Performed by: STUDENT IN AN ORGANIZED HEALTH CARE EDUCATION/TRAINING PROGRAM

## 2022-01-04 PROCEDURE — G8484 FLU IMMUNIZE NO ADMIN: HCPCS | Performed by: STUDENT IN AN ORGANIZED HEALTH CARE EDUCATION/TRAINING PROGRAM

## 2022-01-04 PROCEDURE — 1036F TOBACCO NON-USER: CPT | Performed by: STUDENT IN AN ORGANIZED HEALTH CARE EDUCATION/TRAINING PROGRAM

## 2022-01-04 PROCEDURE — 1090F PRES/ABSN URINE INCON ASSESS: CPT | Performed by: STUDENT IN AN ORGANIZED HEALTH CARE EDUCATION/TRAINING PROGRAM

## 2022-01-04 PROCEDURE — 4040F PNEUMOC VAC/ADMIN/RCVD: CPT | Performed by: STUDENT IN AN ORGANIZED HEALTH CARE EDUCATION/TRAINING PROGRAM

## 2022-01-04 PROCEDURE — G8417 CALC BMI ABV UP PARAM F/U: HCPCS | Performed by: STUDENT IN AN ORGANIZED HEALTH CARE EDUCATION/TRAINING PROGRAM

## 2022-01-04 NOTE — TELEPHONE ENCOUNTER
Dr. Freeman George [facility] requesting office reach out to patient to schedule ED f/u appt from 12/20. Per consult note on 12/20:  Radiology Review:  12/20/21 - XR left hand demonstrating no acute bony involvement. No fractures, dislocation or abnormalities visualized. Mild soft tissue swelling.        IMPRESSION:   · Left middle finger infection     PLAN:  · NWB - LUE  · The left hand was positioned and prepped using betadine and alcohol. The left middle finger was properly and appropriately anesthetized with 1% lidocaine. Using a sterile scalpel a small puncture wound was made at the distal aspect of the palmar surface of the middle finger. Bloody drainage was visualized no signs of purulence. Wound was then copiously irrigated with normal saline and sterile hemostat used to break down any loculations which would entrap purulence. Soft dressing was applied to the middle finger and then wrapped with Kerlix. · Wound cultures were obtained and sent to the lab for appropriate studies. · Prescription antibiotics ordered and sent to patient's preferred pharmacy  · Patient advised if symptoms worsen or signs of infection to immediately report back into the emergency department. · Continue current over-the-counter pain medication. · Continue ice and elevation to decrease swelling  · Discussed with Dr. Elvira Jesus    Wound culture  Component 12/20/21 1945   Organism Morganella morganii ssp morganii Abnormal     WOUND/ABSCESS Heavy growth    Organism Escherichia coli Abnormal     WOUND/ABSCESS Light growth      XR L finger:      Impression   No acute abnormality involving the left 3rd digit.             Patient was DC'd on bactrim 800-160mg BID for 10 days supply from ED    Per Dr. Mary Norwood note on 1/4/21 :  Musculoskeletal:         General: Normal range of motion. Cervical back: Normal range of motion and neck supple.       Comments: Left index finger with approximate 1cm necrotic defect at the level of the nail bed, no purulence

## 2022-01-04 NOTE — TELEPHONE ENCOUNTER
Call placed to pt, appt made for 1/5 at 1:30. She verbally confirmed appt date and time. Direction to office provided.

## 2022-01-05 ENCOUNTER — HOSPITAL ENCOUNTER (OUTPATIENT)
Dept: GENERAL RADIOLOGY | Age: 67
Discharge: HOME | End: 2022-01-07
Payer: COMMERCIAL

## 2022-01-05 ENCOUNTER — OFFICE VISIT (OUTPATIENT)
Dept: ORTHOPEDIC SURGERY | Age: 67
End: 2022-01-05
Payer: COMMERCIAL

## 2022-01-05 VITALS — WEIGHT: 182 LBS | BODY MASS INDEX: 26.05 KG/M2 | HEIGHT: 70 IN

## 2022-01-05 DIAGNOSIS — M86.9 OSTEOMYELITIS OF LEFT HAND, UNSPECIFIED TYPE (HCC): Primary | ICD-10-CM

## 2022-01-05 DIAGNOSIS — L08.9 FINGER INFECTION: ICD-10-CM

## 2022-01-05 PROCEDURE — 73140 X-RAY EXAM OF FINGER(S): CPT

## 2022-01-05 PROCEDURE — G8417 CALC BMI ABV UP PARAM F/U: HCPCS | Performed by: PHYSICIAN ASSISTANT

## 2022-01-05 PROCEDURE — 99202 OFFICE O/P NEW SF 15 MIN: CPT

## 2022-01-05 PROCEDURE — 99203 OFFICE O/P NEW LOW 30 MIN: CPT | Performed by: PHYSICIAN ASSISTANT

## 2022-01-05 PROCEDURE — 4040F PNEUMOC VAC/ADMIN/RCVD: CPT | Performed by: PHYSICIAN ASSISTANT

## 2022-01-05 PROCEDURE — G8400 PT W/DXA NO RESULTS DOC: HCPCS | Performed by: PHYSICIAN ASSISTANT

## 2022-01-05 PROCEDURE — 1090F PRES/ABSN URINE INCON ASSESS: CPT | Performed by: PHYSICIAN ASSISTANT

## 2022-01-05 PROCEDURE — G8484 FLU IMMUNIZE NO ADMIN: HCPCS | Performed by: PHYSICIAN ASSISTANT

## 2022-01-05 PROCEDURE — 3017F COLORECTAL CA SCREEN DOC REV: CPT | Performed by: PHYSICIAN ASSISTANT

## 2022-01-05 PROCEDURE — G8427 DOCREV CUR MEDS BY ELIG CLIN: HCPCS | Performed by: PHYSICIAN ASSISTANT

## 2022-01-05 PROCEDURE — 1123F ACP DISCUSS/DSCN MKR DOCD: CPT | Performed by: PHYSICIAN ASSISTANT

## 2022-01-05 PROCEDURE — 1036F TOBACCO NON-USER: CPT | Performed by: PHYSICIAN ASSISTANT

## 2022-01-05 RX ORDER — SULFAMETHOXAZOLE AND TRIMETHOPRIM 800; 160 MG/1; MG/1
1 TABLET ORAL 2 TIMES DAILY
Qty: 20 TABLET | Refills: 0 | Status: SHIPPED
Start: 2022-01-05 | End: 2022-01-18

## 2022-01-05 NOTE — PATIENT INSTRUCTIONS
You were ordered MRI of left hand by your Orthopedic Provider.   If you do not hear from that department please contact: MRI- [phone]

## 2022-01-05 NOTE — PROGRESS NOTES
Patient here for an ED follow up from 12/20/2021 for left middle finger nail infection. Patient finished her AB last Thursday. Patient's fingernail fell off in the bandages, and she kept the bandage on till December 29th. Patient has been applying neosporin to the finger. Patient states that the finger tip and under the tip is very painful. Patient is taking tylenol as needed for discomfort. Patient noted that when she went to the ED, they pushed fluids out of the finger and there was a foul odor, but that has since cleared.         Electronically signed by Román Rutherford MA on 1/5/2022 at 1:14 PM details… detailed exam ROM intact/no joint erythema/no calf tenderness/normal strength

## 2022-01-05 NOTE — PROGRESS NOTES
New Patient Orthopaedic Progress Note    Ike Gutierrez is a 73 y.o. female, her YOB: 1955 with the following history as recorded in [facility]:      Patient Active Problem List    Diagnosis Date Noted    Hyperkalemia, diminished renal excretion 11/09/2017    Patient is Holiness 11/07/2017    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 ([address] 11/07/2017    Malignant neoplasm of right female breast ([address] 12/24/2012    Atherosclerosis of native coronary artery of native heart without angina pectoris     Pancytopenia ([address] 02/17/2017    Moderate obesity 03/27/2013    Diabetic retinopathy ([address]     CAD in native artery 12/09/2021    Mitral valve disease 11/11/2021    Ventricular tachycardia ([address] 05/24/2021    Cardiac arrest ([address]     ESRD (end stage renal disease) on dialysis ([address]     Mixed hyperlipidemia     Lymphedema of right upper extremity     Coronary artery disease involving native coronary artery of native heart with angina pectoris ([address]     Spinal stenosis of lumbar region with neurogenic claudication 02/06/2020    ESRD (end stage renal disease) ([address] 11/25/2019    Bilateral carpal tunnel syndrome 11/25/2019    Diabetic peripheral neuropathy ([address] 08/30/2018    Chronic pain syndrome 08/02/2018    Lumbar post-laminectomy syndrome 08/02/2018    Myalgia 08/02/2018    Cervicalgia 08/02/2018    Vitreous hemorrhage ([address]     Controlled type 2 diabetes mellitus with chronic kidney disease on chronic dialysis, with long-term current use of insulin ([address] 02/22/2017    CKD (chronic kidney disease) stage 4, GFR 15-29 ml/min ([facility])     Insulin dependent type 2 diabetes mellitus ([address]     Anemia of chronic disease     Neuropathy     Hypertension     Glaucoma     Refusal of blood product     Lymphedema of arm 12/11/2014    Lumbar radiculopathy 10/25/2014    Pseudomeningocele 10/06/2014    Left ventricular hypertrophy 09/23/2014    Herniated lumbar intervertebral disc 09/23/2014    Lumbar degenerative disc disease 09/23/2014    Chronic diastolic CHF (congestive heart failure) ([facility]) 09/23/2014     Current Outpatient Medications   Medication Sig Dispense Refill    sulfamethoxazole-trimethoprim (BACTRIM DS) 800-160 MG per tablet Take 1 tablet by mouth 2 times daily 20 tablet 0    LANTUS SOLOSTAR 100 UNIT/ML injection pen inject 11 units into the skin nightly 15 mL 0    isosorbide mononitrate (IMDUR) 30 MG extended release tablet TAKE ONE TABLET BY MOUTH DAILY 30 tablet 5    metoprolol succinate (TOPROL XL) 25 MG extended release tablet Take 1 tablet by mouth daily 90 tablet 1    gabapentin (NEURONTIN) 100 MG capsule Take 1 capsule by mouth 3 times daily for 180 days.  Intended supply: 30 days 90 capsule 5    bumetanide (BUMEX) 2 MG tablet take 1 tablet by mouth 3 times a week on sunday, tuesday and thursday 60 tablet 0    lanthanum (FOSRENOL) 1000 MG chewable tablet CHEW AND SWALLOW ONE TABLET BY MOUTH THREE TIMES DAILY WITH MEALS      allopurinol (ZYLOPRIM) 100 MG tablet Take 1 tablet by mouth daily 90 tablet 1    atorvastatin (LIPITOR) 40 MG tablet Take 1 tablet by mouth daily 90 tablet 1    Insulin Pen Needle (ULTICARE MINI PEN NEEDLES) 31G X 6 MM MISC USE 1 PEN NEEDLE 4 times daily 100 each 5    blood glucose test strips (ASCENSIA AUTODISC VI;ONE TOUCH ULTRA TEST VI) strip 1 each by In Vitro route 2 times daily 200 each 5    ticagrelor (BRILINTA) 90 MG TABS tablet Take 1 tablet by mouth 2 times daily 180 tablet 1    aspirin 81 MG EC tablet Take 1 tablet by mouth daily 30 tablet 3    hydrALAZINE (APRESOLINE) 10 MG tablet Take 1 tablet by mouth every 8 hours (Patient taking differently: Take 10 mg by mouth 3 times daily ) 270 tablet 1    midodrine (PROAMATINE) 5 MG tablet Take 1 tablet by mouth as needed (may repeat x1 durring HD for assymptomatic SBP<100) 90 tablet 0    B Complex-C-Folic Acid (BONI CAPS) 1 MG CAPS Take 1 mg by mouth daily      omeprazole (PRILOSEC) 20 MG delayed release capsule Take 20 mg by mouth nightly      LUMIGAN 0.01 % SOLN ophthalmic drops Place 1 drop into the right eye nightly       COMBIGAN 0.2-0.5 % ophthalmic solution Place 1 drop into the right eye every 12 hours   7     No current facility-administered medications for this visit. Allergies: Furosemide  Past Medical History:   Diagnosis Date    Acute infection of bone ([address]     infection of rt foot, resolved.     Anemia of chronic disease     Arthritis     Breast cancer ([address]     right breast, 2008/ bladder, 2006- last chemotherapy \"years ago\"    CAD (coronary artery disease)     Carpal tunnel syndrome     bilat - for OR left hand 3-17-20     Chronic diastolic CHF (congestive heart failure) ([address] 09/23/2014 9/23/14- echocardiogram revealed moderate LV concentric hypertrophy, stage III diastolic dysfunction, mild tricuspid regurgitation    CKD (chronic kidney disease) stage 4, GFR 15-29 ml/min ([facility])     Diabetic retinopathy ([address]     Glaucoma     Hemodialysis patient ([address]     [facility]- Dr. Jd Whaley - left arm fistula     Hyperkalemia, diminished renal excretion 11/9/2017    Hyperlipidemia     Hypertension     Hypoglycemia unawareness in type 1 diabetes mellitus ([address] 11/7/2017    Insulin dependent type 2 diabetes mellitus ([address]     Neuropathy     feet    Osteomyelitis due to secondary diabetes ([address]     rt great toe with amputation    Patient is Jewish 11/7/2017    Refusal of blood product     patient states she dose not take blood transfusion    Ventricular hypertrophy     Vitreous hemorrhage ([address]     left eye     Past Surgical History:   Procedure Laterality Date    AMPUTATION      right great toe    ANKLE SURGERY      correction on charcot joint of right ankle    CARDIAC CATHETERIZATION  12/09/2021    Dr Laquita Opitz Left 3/17/2020    LEFT CARPAL TUNNEL RELEASE performed by Bashir Ely Solomon Noyola MD at [address]      bilateral    CHOLECYSTECTOMY      COLONOSCOPY      CYSTOSCOPY      DIALYSIS FISTULA CREATION Left 01/31/2018    upper arm/Dr. Eusebio Zaragoza    ECHO COMPL W DOP COLOR FLOW  2/14/2013         ECHO COMPLETE  9/17/2013         MASTECTOMY      right    OTHER SURGICAL HISTORY  9/27/2011    PPV, membranectomy, laser Right eye    OTHER SURGICAL HISTORY  insertion lumbar drain insertion    10/12/`14    OTHER SURGICAL HISTORY  10/22/15    percutaneous lead placement for spinal cord stimulator    OTHER SURGICAL HISTORY  11/03/2015    Spinal; cord stimulator- turned off as of 3-10-20     SC AV ANAST,UP ARM BASILIC VEIN TRANSPOSIT Left 5/15/2018    TRANSPOSITION STAGE II AV FISTULA - LEFT UPPER ARM performed by Lobo Grace MD at [facility] ANGIOACCESS AV FISTULA Left 9/25/2018    SUPERFICIALIZATION AV FISTULA - LEFT ARM performed by Lobo Grace MD at [facility] W/VITRECTOMY ANY METH Left 4/10/2018    PARS PLANA VITRECTOMY 25 GAUGE RETINAL DETACHMENT REPAIR air fluid exchange, endolaser performed by Mason Catalan MD at [address]      [address]    TRANSESOPHAGEAL ECHOCARDIOGRAM  11/11/2021    Dr. Seena Severance TUN[address]ELED [address]  11/15/2017    VITRECTOMY Left 04/10/2018    PARS PLANA VITRECTOMY; RETINAL DETACHMENT REPAIR; GAS BUBBLE; LASER LEFT EYE     Family History   Problem Relation Age of Onset    Breast Cancer Mother 61    Hypertension Mother     Heart Disease Father     Prostate Cancer Father     Breast Cancer Maternal Grandmother 61     Social History     Tobacco Use    Smoking status: Never Smoker    Smokeless tobacco: Never Used   Substance Use Topics    Alcohol use:  No                             Chief Complaint   Patient presents with    ED Follow-up     left middle finger       SUBJECTIVE: Patient is a new 78-year-old female with past medical history of insulin dependent diabetes mellitus type 2, ESRD on dialysis, diabetic neuropathy, and significant cardiovascular history requiring CABG x2 that was scheduled for 12/29 that was canceled due to her left middle finger infection that we are seeing her for today. Ortho trauma resident consulted during her initial ED visit on 12/20/2021. I&D was performed, cultures were taken that were positive for heavy growth of Morganella morganii and light growth of E. coli bacteria, susceptible to Bactrim. Patient has completed 10 days of Bactrim without any significant side effects. States that she is putting Neosporin on the wound and keeping it covered with dry dressings. States that her drainage has subsided, however she is having severe pain to the distal aspect of the third finger with limited ability of active range of motion at the DIP joint. She is not sure exactly how this infection started and denies any trauma. States that she was having increasing third finger pain for couple weeks leading up to her ED visit. Denies fever, chills, myalgias. She has allergies to furosemide. See medication list for current meds. No previous past orthopedic surgery to that extremity or previous issues to that finger. Review of Systems   Constitutional: Negative for fever, chills, diaphoresis, appetite change and fatigue. HENT: Negative for dental issues, hearing loss and tinnitus. Negative for congestion, sinus pressure, sneezing, sore throat. Negative for headache. Eyes: Negative for visual disturbance, blurred and double vision. Negative for pain, discharge, redness and itching  Respiratory: Negative for cough, shortness of breath and wheezing. Cardiovascular: Negative for chest pain, palpitations and leg swelling. No dyspnea on exertion   Gastrointestinal:   Negative for nausea, vomiting, abdominal pain, diarrhea, constipation  or black or bloody.   Hematologic\Lymphatic:  negative for bleeding, petechiae,   Genitourinary: Negative for hematuria and difficulty urinating. Musculoskeletal: Negative for neck pain and stiffness. Negative for back pain, see HPI  Skin: Negative for pallor, rash and wound. Neurological: Negative for dizziness, tremors, seizures, weakness, light-headedness, no TIA or stroke symptoms. No numbness and headaches. Psychiatric/Behavioral: Negative. OBJECTIVE:      Physical Examination:   General appearance: alert, well appearing, and in no distress,  normal appearing weight. No visible signs of trauma   Mental status: alert, oriented to person, place, and time, normal mood, behavior, speech, dress, motor activity, and thought processes  Abdomen: soft, nondistended  Resp:   resp easy and unlabored, no audible wheezes note, normal symmetrical expansion of both hemithoraces  Cardiac: distal pulses palpable, skin and extremities well perfused  Neurological: alert, oriented X3, normal speech, no focal findings or movement disorder noted, motor and sensory grossly normal bilaterally, normal muscle tone, no tremors, strength 5/5, normal gait and station  HEENT: normochephalic atraumatic, external ears and eyes normal, sclera normal, neck supple, no nasal discharge. Extremities:   See below evaluation   Skin: normal coloration, no rashes or open wounds, no suspicious skin lesions noted  Psych: Affect euthymic   Musculoskeletal:   Extremity:  Left Upper Extremity    Radial pulse palpable, fingers warm with BCR  Flex/extension intact to wrist, thumb and index, ring, and 5th fingers. Full AROM at PIP and MCP of the third finger.  No active motion achieved at the DIP joint of 3rd finger with moderate-severe tenderness when attempting to passively range the joint  Mottled discoloration to the distal 3rd finger with circumferential edema distally  Nail has completed aborted to 3rd finger with open wound in the nail bed without warmth or active drainage  Moderately TTP to the distal 3rd finger   Finger opposition intact  Finger adduction/abduction intact  Finger crossover intact  Subjectively states sensation intact to radial/medial/ulnar distribution          Ht 5' 10\" (1.778 m)   Wt 182 lb (82.6 kg)   BMI 26.11 kg/m²      XR: 1/5/22    3 views of L hand demonstrating increased soft tissue lucency and osseus erosive changes of distal phalanx of 3rd finger compared to previous XR on 12/20/21. No significant change in alignment. No acute fractures or dislocations or any other osseus abnormality identified. ASSESSMENT:     Diagnosis Orders   1. Osteomyelitis of left hand, unspecified type (HCC)  XR FINGER LEFT (MIN 2 VIEWS)    sulfamethoxazole-trimethoprim (BACTRIM DS) 800-160 MG per tablet    BASIC METABOLIC PANEL    MRI HAND LEFT WO CONTRAST       Discussion: Had lengthy discussion with patient regarding Her diagnosis, typical prognosis, and expected outcomes. I reviewed the possible complications from the injury itself despite treatment choosen. I also discussed treatment options including nonoperative managements versus surgical management, along with risks and benefits of each. Discussed erosive changes to the distal 3rd phalanx of L hand on MRI along with clinical signs and sxs being concerning for osteomyelitis. I did discuss future use of IV abx with potential amputation pending MRI results. Anticipatory guidance regarding worsening infx reviewed with patient and when to seek sooner care and patient verbalized understanding. Will continue to closely monitor. PLAN:  · Discussed XR results with patient  · NWB through L middle finger  · Bactrim sent to pharmacy today  · BMP ordered to be done today to continue monitoring kidney function secondary to prolonged Bactrim   · MRI L hand to evaluate extent of osteomyelitis   · Discussed with Dr. Christopher Montesinos    F/u 7-10 days for wound check and MRI results to discuss plan of care - possible amputation with level of amputation pending MRI results.      Electronically signed by Bianka Conti MARYJO Michael on 1/5/2022 at 2:19 PM  Note: This report was completed using computerGreenext voiced recognition software. Every effort has been made to ensure accuracy; however, inadvertent computerized transcription errors may be present.

## 2022-01-07 ENCOUNTER — TELEPHONE (OUTPATIENT)
Dept: ORTHOPEDIC SURGERY | Age: 67
End: 2022-01-07

## 2022-01-07 DIAGNOSIS — M86.9 OSTEOMYELITIS OF LEFT HAND, UNSPECIFIED TYPE (HCC): Primary | ICD-10-CM

## 2022-01-07 NOTE — TELEPHONE ENCOUNTER
Pt left  requesting call back. She stated MRI department notified her they are unable to complete due to having pain stimulator in back. Requesting CT. Order pended and routed for decision and signature.

## 2022-01-10 ENCOUNTER — TELEPHONE (OUTPATIENT)
Dept: ORTHOPEDIC SURGERY | Age: 67
End: 2022-01-10

## 2022-01-10 NOTE — TELEPHONE ENCOUNTER
Patient called office inquiring about CT scan. Provided patient with phone number to call to schedule. Patient will plan to come to 01/12/22 appt for wound check.     Future Appointments   Date Time Provider Dev Gustafsoni   1/12/2022  1:45 PM Yeni Josue MD [facility]   3/15/2022  8:20 AM Saurav Bishop MD [facility]   3[facility]24/2022  8[facility]   5/24/2022  7:30 AM Linn Tucker  [address]

## 2022-01-12 ENCOUNTER — TELEPHONE (OUTPATIENT)
Dept: ORTHOPEDIC SURGERY | Age: 67
End: 2022-01-12

## 2022-01-12 ENCOUNTER — OFFICE VISIT (OUTPATIENT)
Dept: ORTHOPEDIC SURGERY | Age: 67
End: 2022-01-12
Payer: COMMERCIAL

## 2022-01-12 VITALS — TEMPERATURE: 98.2 F

## 2022-01-12 DIAGNOSIS — M86.9 OSTEOMYELITIS OF FINGER OF LEFT HAND (HCC): Primary | ICD-10-CM

## 2022-01-12 PROCEDURE — 99212 OFFICE O/P EST SF 10 MIN: CPT

## 2022-01-12 PROCEDURE — G8417 CALC BMI ABV UP PARAM F/U: HCPCS | Performed by: ORTHOPAEDIC SURGERY

## 2022-01-12 PROCEDURE — G8484 FLU IMMUNIZE NO ADMIN: HCPCS | Performed by: ORTHOPAEDIC SURGERY

## 2022-01-12 PROCEDURE — 4040F PNEUMOC VAC/ADMIN/RCVD: CPT | Performed by: ORTHOPAEDIC SURGERY

## 2022-01-12 PROCEDURE — 1090F PRES/ABSN URINE INCON ASSESS: CPT | Performed by: ORTHOPAEDIC SURGERY

## 2022-01-12 PROCEDURE — 3017F COLORECTAL CA SCREEN DOC REV: CPT | Performed by: ORTHOPAEDIC SURGERY

## 2022-01-12 PROCEDURE — G8427 DOCREV CUR MEDS BY ELIG CLIN: HCPCS | Performed by: ORTHOPAEDIC SURGERY

## 2022-01-12 PROCEDURE — 1036F TOBACCO NON-USER: CPT | Performed by: ORTHOPAEDIC SURGERY

## 2022-01-12 PROCEDURE — 99205 OFFICE O/P NEW HI 60 MIN: CPT | Performed by: ORTHOPAEDIC SURGERY

## 2022-01-12 PROCEDURE — 1123F ACP DISCUSS/DSCN MKR DOCD: CPT | Performed by: ORTHOPAEDIC SURGERY

## 2022-01-12 PROCEDURE — G8400 PT W/DXA NO RESULTS DOC: HCPCS | Performed by: ORTHOPAEDIC SURGERY

## 2022-01-12 NOTE — TELEPHONE ENCOUNTER
Call placed to [facility] with [employer], per [address] [place] [address] as an outpatient procedure does not require prior auth. Reference #[id number].

## 2022-01-12 NOTE — PATIENT INSTRUCTIONS
We will notify your PCP about your upcoming surgery    1. Your surgery is scheduled for 1/20/22 at 10:00  with Dr. Miki Soler MD at the main [address] [place] [address] in [facility] . You will need to report to Preop area  that morning at 8:00    2. You are having Outpatient surgery so you will be returning home the same day  3. Preadmission Testing (PAT) department at [facility] will contact you with all the details prior to surgery. 4. Nothing to eat or drink after midnight the night before surgery. You may take a pain pill and any other medicine PAT instructs you to take with small sip of water if needed. 5. Keep splint clean and dry. Do not remove or get wet. 6. Continue with ice and elevation to reduce swelling  7. No weight bearing left upper extremity, use assistive devices  8. Take pain medicine as instructed  9. Call office with any question or concerns: [phone]. Hold ASA/LOVENOX the day of surgery.  Hold all NSAIDs 7 days prior to surgery

## 2022-01-12 NOTE — PROGRESS NOTES
Chief Complaint   Patient presents with    Follow-up     L middle finger OM. 1wk wound check. Pain 5/10, denies numbness or tingling, fever or chills. Denies drainage    Other     Finishes bactrim on Thur       SUBJECTIVE: Patient is a pleasant 46-year-old female sent to me for osteomyelitis of her left third digit distal phalanx. She has had a wound for some time now. She had to have her cardiac procedure canceled due to the infection. She has erosive changes on x-ray. She has chronic drainage and skin changes as well. She has had an amputation of the toe in the past.  She denies any other areas of drainage on her hands. He is unable to get her heart surgery in light of the osteomyelitis of the left third digit. Past Medical History:   Diagnosis Date    Acute infection of bone ([address]     infection of rt foot, resolved.     Anemia of chronic disease     Arthritis     Breast cancer ([address]     right breast, 2008/ bladder, 2006- last chemotherapy \"years ago\"    CAD (coronary artery disease)     Carpal tunnel syndrome     bilat - for OR left hand 3-17-20     Chronic diastolic CHF (congestive heart failure) ([address] 09/23/2014 9/23/14- echocardiogram revealed moderate LV concentric hypertrophy, stage III diastolic dysfunction, mild tricuspid regurgitation    CKD (chronic kidney disease) stage 4, GFR 15-29 ml/min ([facility])     Diabetic retinopathy ([address]     Glaucoma     Hemodialysis patient ([address]     [facility]- Dr. Suzette Asif - left arm fistula     Hyperkalemia, diminished renal excretion 11/9/2017    Hyperlipidemia     Hypertension     Hypoglycemia unawareness in type 1 diabetes mellitus ([address] 11/7/2017    Insulin dependent type 2 diabetes mellitus ([address]     Neuropathy     feet    Osteomyelitis due to secondary diabetes ([address]     rt great toe with amputation    Patient is [employer] 11/7/2017    Refusal of blood product     patient states she dose not take blood transfusion    Ventricular hypertrophy     Vitreous hemorrhage ([address]     left eye     Past Surgical History:   Procedure Laterality Date    AMPUTATION      right great toe    ANKLE SURGERY      correction on charcot joint of right ankle    CARDIAC CATHETERIZATION  12/09/2021    Dr Eusebio Becker Left 3/17/2020    LEFT CARPAL TUNNEL RELEASE performed by Kelli Blizzard, MD at [address]      bilateral    CHOLECYSTECTOMY      COLONOSCOPY      CYSTOSCOPY      DIALYSIS FISTULA CREATION Left 01/31/2018    upper arm/Dr. Mireille Cardona    ECHO COMPL W DOP COLOR FLOW  2/14/2013         ECHO COMPLETE  9/17/2013         MASTECTOMY      right    OTHER SURGICAL HISTORY  9/27/2011    PPV, membranectomy, laser Right eye    OTHER SURGICAL HISTORY  insertion lumbar drain insertion    10/12/`14    OTHER SURGICAL HISTORY  10/22/15    percutaneous lead placement for spinal cord stimulator    OTHER SURGICAL HISTORY  11/03/2015    Spinal; cord stimulator- turned off as of 3-10-20     DC AV ANAST,UP ARM BASILIC VEIN TRANSPOSIT Left 5/15/2018    TRANSPOSITION STAGE II AV FISTULA - LEFT UPPER ARM performed by Krystian Rees MD at [facility] ANGIOACCESS AV FISTULA Left 9/25/2018    SUPERFICIALIZATION AV FISTULA - LEFT ARM performed by Kyrstian Rees MD at [facility] W/VITRECTOMY ANY METH Left 4/10/2018    PARS PLANA VITRECTOMY 25 GAUGE RETINAL DETACHMENT REPAIR air fluid exchange, endolaser performed by Ivanna Syed MD at [address] TRANSESOPHAGEAL ECHOCARDIOGRAM  11/11/2021    Dr. Jorge Villanueva TUNNELED 1 [facility]  11/15/2017    VITRECTOMY Left 04/10/2018    PARS PLANA VITRECTOMY; RETINAL DETACHMENT REPAIR; GAS BUBBLE; LASER LEFT EYE     Family History   Problem Relation Age of Onset    Breast Cancer Mother 61    Hypertension Mother     Heart Disease Father     Prostate Cancer Father     Breast Cancer Maternal Grandmother 61     Social History     Tobacco Use    Smoking status: Never Smoker    Smokeless tobacco: Never Used   Vaping Use    Vaping Use: Never used   Substance Use Topics    Alcohol use: No    Drug use: No     Allergies   Allergen Reactions    Furosemide Other (See Comments)     States her kidneys shut down  Other reaction(s): Unknown         Review of Systems   Constitutional: Negative for fever, chills, diaphoresis, appetite change and fatigue. HENT: Negative for dental issues, hearing loss and tinnitus. Negative for congestion, sinus pressure, sneezing, sore throat. Negative for headache. Eyes: Negative for visual disturbance, blurred and double vision. Negative for pain, discharge, redness and itching  Respiratory: Negative for cough, shortness of breath and wheezing. Cardiovascular: Negative for chest pain, palpitations and leg swelling. No dyspnea on exertion   Gastrointestinal:   Negative for nausea, vomiting, abdominal pain, diarrhea, constipation  or black or bloody. Hematologic\Lymphatic:  negative for bleeding, petechiae,   Genitourinary: Negative for hematuria and difficulty urinating. Musculoskeletal: Negative for neck pain and stiffness. Negative for back pain, see HPI  Skin: Negative for pallor, rash and wound. Neurological: Negative for dizziness, tremors, seizures, weakness, light-headedness, no TIA or stroke symptoms. No numbness and headaches. Psychiatric/Behavioral: Negative. OBJECTIVE:      Physical Examination:   General appearance: alert, well appearing, and in no distress,  normal appearing weight.  No visible signs of trauma   Mental status: alert, oriented to person, place, and time, normal mood, behavior, speech, dress, motor activity, and thought processes  Abdomen: soft, nondistended  Resp:   resp easy and unlabored, no audible wheezes note, normal symmetrical expansion of both hemithoraces  Cardiac: distal pulses palpable, skin and extremities well perfused  Neurological: alert, oriented X3, normal speech, no focal findings or movement disorder noted, motor and sensory grossly normal bilaterally, normal muscle tone, no tremors, strength 5/5, normal gait and station  HEENT: normochephalic atraumatic, external ears and eyes normal, sclera normal, neck supple, no nasal discharge. Extremities:   peripheral pulses normal, no edema, redness or tenderness in the calves   Skin: normal coloration, no rashes or open wounds, no suspicious skin lesions noted  Psych: Affect euthymic   Musculoskeletal:   Extremity:  Left third digit  Maceration of tissue with exposed nailbed and drainage at the distal phalanx  Is able to flex and touch her fingertips to her palm  No swelling proximal to the DIP joint  No tenderness over the flexor tendon  2/4 radial pulse  Compartment soft compressible  Minimal sensation of the third digit does have gross sensation intact other digits  No proximal tenderness palpation    Temp 98.2 °F (36.8 °C)      XR: X-rays reviewed from 1/5/2022 showing erosion of the distal phalanx of left third digit consistent with osteomyelitis, overall multiple joints with osteoarthritis. ASSESSMENT:     Diagnosis Orders   1. Osteomyelitis of finger of left hand (HCC)      Left third digit distal phalanx        Discussion: Talked the patient detail about the fact that an amputation would be the best surgery for her distal phalanx. I explained that should remove for nice infection. She is unable to get an MRI I did explain there is a chance that infection could be proximal under middle phalanx but is not grossly infected on x-ray. I think for the sake of her getting her heart surgery and amputation will be the quickest way to remove the nidus of infection. I also explained that she could have issues with healing the wound from her amputation she understands this. She may need further shortening or amputations in the future she understands this as well. Went to the risk of surgery including death and anesthesia, further infection, further need for operations, wound healing issues, and any other unforeseeable complications. The patient understood decided to go forward with amputation of the distal phalanx of the left third digit.     PLAN:  Amputation left third digit distal phalanx for osteomyelitis    Follow-up for surgery    Call with any questions or concerns    ELECTRONICALLY signed by:    Tianna Figueroa MD  1/12/22

## 2022-01-13 ENCOUNTER — PREP FOR PROCEDURE (OUTPATIENT)
Dept: ORTHOPEDIC SURGERY | Age: 67
End: 2022-01-13

## 2022-01-13 RX ORDER — SODIUM CHLORIDE 0.9 % (FLUSH) 0.9 %
10 SYRINGE (ML) INJECTION EVERY 12 HOURS SCHEDULED
Status: CANCELLED | OUTPATIENT
Start: 2022-01-13

## 2022-01-13 RX ORDER — SODIUM CHLORIDE 0.9 % (FLUSH) 0.9 %
10 SYRINGE (ML) INJECTION PRN
Status: CANCELLED | OUTPATIENT
Start: 2022-01-13

## 2022-01-13 RX ORDER — SODIUM CHLORIDE 9 MG/ML
25 INJECTION, SOLUTION INTRAVENOUS PRN
Status: CANCELLED | OUTPATIENT
Start: 2022-01-13

## 2022-01-13 NOTE — H&P (VIEW-ONLY)
Chief Complaint   Patient presents with    Follow-up       L middle finger OM. 1wk wound check. Pain 5/10, denies numbness or tingling, fever or chills. Denies drainage    Other       Finishes bactrim on Thur         SUBJECTIVE: Patient is a pleasant 71-year-old female sent to me for osteomyelitis of her left third digit distal phalanx. She has had a wound for some time now. She had to have her cardiac procedure canceled due to the infection. She has erosive changes on x-ray. She has chronic drainage and skin changes as well. She has had an amputation of the toe in the past.  She denies any other areas of drainage on her hands. He is unable to get her heart surgery in light of the osteomyelitis of the left third digit.     Past Medical History        Past Medical History:   Diagnosis Date    Acute infection of bone ([address]       infection of rt foot, resolved.     Anemia of chronic disease      Arthritis      Breast cancer (HCC)       right breast, 2008/ bladder, 2006- last chemotherapy \"years ago\"    CAD (coronary artery disease)      Carpal tunnel syndrome       bilat - for OR left hand 3-17-20     Chronic diastolic CHF (congestive heart failure) ([address] 09/23/2014 9/23/14- echocardiogram revealed moderate LV concentric hypertrophy, stage III diastolic dysfunction, mild tricuspid regurgitation    CKD (chronic kidney disease) stage 4, GFR 15-29 ml/min ([facility])      Diabetic retinopathy ([facility])      Glaucoma      Hemodialysis patient ([address]       [facility] wed fri- Dr. Gwendolyn Ernst - left arm fistula     Hyperkalemia, diminished renal excretion 11/9/2017    Hyperlipidemia      Hypertension      Hypoglycemia unawareness in type 1 diabetes mellitus ([address] 11/7/2017    Insulin dependent type 2 diabetes mellitus ([address]      Neuropathy       feet    Osteomyelitis due to secondary diabetes ([address]       rt great toe with amputation    Patient is [employer] 11/7/2017    Refusal of blood product     patient states she dose not take blood transfusion    Ventricular hypertrophy      Vitreous hemorrhage (HCC)       left eye         Past Surgical History         Past Surgical History:   Procedure Laterality Date    AMPUTATION         right great toe    ANKLE SURGERY         correction on charcot joint of right ankle    CARDIAC CATHETERIZATION   12/09/2021     Dr Yuliana Gonzalez Left 3/17/2020     LEFT CARPAL TUNNEL RELEASE performed by Arnaud Gomez MD at [address]         bilateral    CHOLECYSTECTOMY        COLONOSCOPY        CYSTOSCOPY        DIALYSIS FISTULA CREATION Left 01/31/2018     upper arm/Dr. Aurora Milan    ECHO COMPL W DOP COLOR FLOW   2/14/2013          ECHO COMPLETE   9/17/2013          MASTECTOMY         right    OTHER SURGICAL HISTORY   9/27/2011     PPV, membranectomy, laser Right eye    OTHER SURGICAL HISTORY   insertion lumbar drain insertion     10/12/`14    OTHER SURGICAL HISTORY   10/22/15     percutaneous lead placement for spinal cord stimulator    OTHER SURGICAL HISTORY   11/03/2015     Spinal; cord stimulator- turned off as of 3-10-20     NC AV ANAST,UP ARM BASILIC VEIN TRANSPOSIT Left 5/15/2018     TRANSPOSITION STAGE II AV FISTULA - LEFT UPPER ARM performed by Autumn Pfeiffer MD at [address] [facility] ANGIOACCESS AV FISTULA Left 9/25/2018     SUPERFICIALIZATION AV FISTULA - LEFT ARM performed by Autumn Pfeiffer MD at [address] METH Left 4/10/2018     PARS PLANA VITRECTOMY 25 GAUGE RETINAL DETACHMENT REPAIR air fluid exchange, endolaser performed by Mannie Boas, MD at [address]         L2    TRANSESOPHAGEAL ECHOCARDIOGRAM   11/11/2021     Dr. Magali Santiago TUNNELED [facility]   11/15/2017    VITRECTOMY Left 04/10/2018     PARS PLANA VITRECTOMY; RETINAL DETACHMENT REPAIR; GAS BUBBLE; LASER LEFT EYE         Family History         Family History Problem Relation Age of Onset    Breast Cancer Mother 61    Hypertension Mother      Heart Disease Father      Prostate Cancer Father      Breast Cancer Maternal Grandmother 61         Social History           Tobacco Use    Smoking status: Never Smoker    Smokeless tobacco: Never Used   Vaping Use    Vaping Use: Never used   Substance Use Topics    Alcohol use: No    Drug use: No            Allergies   Allergen Reactions    Furosemide Other (See Comments)       States her kidneys shut down  Other reaction(s): Unknown            Review of Systems   Constitutional: Negative for fever, chills, diaphoresis, appetite change and fatigue. HENT: Negative for dental issues, hearing loss and tinnitus. Negative for congestion, sinus pressure, sneezing, sore throat. Negative for headache. Eyes: Negative for visual disturbance, blurred and double vision. Negative for pain, discharge, redness and itching  Respiratory: Negative for cough, shortness of breath and wheezing. Cardiovascular: Negative for chest pain, palpitations and leg swelling. No dyspnea on exertion   Gastrointestinal:   Negative for nausea, vomiting, abdominal pain, diarrhea, constipation  or black or bloody. Hematologic\Lymphatic:  negative for bleeding, petechiae,   Genitourinary: Negative for hematuria and difficulty urinating. Musculoskeletal: Negative for neck pain and stiffness. Negative for back pain, see HPI  Skin: Negative for pallor, rash and wound. Neurological: Negative for dizziness, tremors, seizures, weakness, light-headedness, no TIA or stroke symptoms. No numbness and headaches. Psychiatric/Behavioral: Negative.         OBJECTIVE:       Physical Examination:   General appearance: alert, well appearing, and in no distress,  normal appearing weight.  No visible signs of trauma   Mental status: alert, oriented to person, place, and time, normal mood, behavior, speech, dress, motor activity, and thought processes  Abdomen: soft, nondistended  Resp:   resp easy and unlabored, no audible wheezes note, normal symmetrical expansion of both hemithoraces  Cardiac: distal pulses palpable, skin and extremities well perfused  Neurological: alert, oriented X3, normal speech, no focal findings or movement disorder noted, motor and sensory grossly normal bilaterally, normal muscle tone, no tremors, strength 5/5, normal gait and station  HEENT: normochephalic atraumatic, external ears and eyes normal, sclera normal, neck supple, no nasal discharge. Extremities:   peripheral pulses normal, no edema, redness or tenderness in the calves   Skin: normal coloration, no rashes or open wounds, no suspicious skin lesions noted  Psych: Affect euthymic   Musculoskeletal:   Extremity:  Left third digit  Maceration of tissue with exposed nailbed and drainage at the distal phalanx  Is able to flex and touch her fingertips to her palm  No swelling proximal to the DIP joint  No tenderness over the flexor tendon  2/4 radial pulse  Compartment soft compressible  Minimal sensation of the third digit does have gross sensation intact other digits  No proximal tenderness palpation     Temp 98.2 °F (36.8 °C)      XR: X-rays reviewed from 1/5/2022 showing erosion of the distal phalanx of left third digit consistent with osteomyelitis, overall multiple joints with osteoarthritis.        ASSESSMENT:       Diagnosis Orders   1. Osteomyelitis of finger of left hand (HCC)        Left third digit distal phalanx          Discussion: Talked the patient detail about the fact that an amputation would be the best surgery for her distal phalanx. I explained that should remove for nice infection. She is unable to get an MRI I did explain there is a chance that infection could be proximal under middle phalanx but is not grossly infected on x-ray. I think for the sake of her getting her heart surgery and amputation will be the quickest way to remove the nidus of infection.   I also explained that she could have issues with healing the wound from her amputation she understands this. She may need further shortening or amputations in the future she understands this as well. Went to the risk of surgery including death and anesthesia, further infection, further need for operations, wound healing issues, and any other unforeseeable complications.   The patient understood decided to go forward with amputation of the distal phalanx of the left third digit.     PLAN:  Amputation left third digit distal phalanx for osteomyelitis     Follow-up for surgery     Call with any questions or concerns

## 2022-01-13 NOTE — H&P
Chief Complaint   Patient presents with    Follow-up       L middle finger OM. 1wk wound check. Pain 5/10, denies numbness or tingling, fever or chills. Denies drainage    Other       Finishes bactrim on Thur         SUBJECTIVE: Patient is a pleasant 70-year-old female sent to me for osteomyelitis of her left third digit distal phalanx. She has had a wound for some time now. She had to have her cardiac procedure canceled due to the infection. She has erosive changes on x-ray. She has chronic drainage and skin changes as well. She has had an amputation of the toe in the past.  She denies any other areas of drainage on her hands. He is unable to get her heart surgery in light of the osteomyelitis of the left third digit.     Past Medical History        Past Medical History:   Diagnosis Date    Acute infection of bone ([address]       infection of rt foot, resolved.     Anemia of chronic disease      Arthritis      Breast cancer (HCC)       right breast, 2008/ bladder, 2006- last chemotherapy \"years ago\"    CAD (coronary artery disease)      Carpal tunnel syndrome       bilat - for OR left hand 3-17-20     Chronic diastolic CHF (congestive heart failure) ([address] 09/23/2014 9/23/14- echocardiogram revealed moderate LV concentric hypertrophy, stage III diastolic dysfunction, mild tricuspid regurgitation    CKD (chronic kidney disease) stage 4, GFR 15-29 ml/min ([facility])      Diabetic retinopathy ([facility])      Glaucoma      Hemodialysis patient ([address]       [facility] wed fri- Dr. Jd Whaley - left arm fistula     Hyperkalemia, diminished renal excretion 11/9/2017    Hyperlipidemia      Hypertension      Hypoglycemia unawareness in type 1 diabetes mellitus ([address] 11/7/2017    Insulin dependent type 2 diabetes mellitus ([address]      Neuropathy       feet    Osteomyelitis due to secondary diabetes ([address]       rt great toe with amputation    Patient is Taoist 11/7/2017    Refusal of blood product     patient states she dose not take blood transfusion    Ventricular hypertrophy      Vitreous hemorrhage (HCC)       left eye         Past Surgical History         Past Surgical History:   Procedure Laterality Date    AMPUTATION         right great toe    ANKLE SURGERY         correction on charcot joint of right ankle    CARDIAC CATHETERIZATION   12/09/2021     Dr Sunil Woodard Left 3/17/2020     LEFT CARPAL TUNNEL RELEASE performed by Lenard Mahoney MD at [address]         bilateral    CHOLECYSTECTOMY        COLONOSCOPY        CYSTOSCOPY        DIALYSIS FISTULA CREATION Left 01/31/2018     upper arm/Dr. Zepeda Best    ECHO COMPL W DOP COLOR FLOW   2/14/2013          ECHO COMPLETE   9/17/2013          MASTECTOMY         right    OTHER SURGICAL HISTORY   9/27/2011     PPV, membranectomy, laser Right eye    OTHER SURGICAL HISTORY   insertion lumbar drain insertion     10/12/`14    OTHER SURGICAL HISTORY   10/22/15     percutaneous lead placement for spinal cord stimulator    OTHER SURGICAL HISTORY   11/03/2015     Spinal; cord stimulator- turned off as of 3-10-20     MT AV ANAST,UP ARM BASILIC VEIN TRANSPOSIT Left 5/15/2018     TRANSPOSITION STAGE II AV FISTULA - LEFT UPPER ARM performed by Ellen Dooley MD at [facility] ANGIOACCESS AV FISTULA Left 9/25/2018     SUPERFICIALIZATION AV FISTULA - LEFT ARM performed by Ellen Dooley MD at [address] METH Left 4/10/2018     PARS PLANA VITRECTOMY 25 GAUGE RETINAL DETACHMENT REPAIR air fluid exchange, endolaser performed by Ashli Lao MD at [place] [address]    TRANSESOPHAGEAL ECHOCARDIOGRAM   11/11/2021     Dr. Sherrie Rodriguez TUNNELED 1 [facility]   11/15/2017    VITRECTOMY Left 04/10/2018     PARS PLANA VITRECTOMY; RETINAL DETACHMENT REPAIR; GAS BUBBLE; LASER LEFT EYE         Family History         Family History Problem Relation Age of Onset    Breast Cancer Mother 61    Hypertension Mother      Heart Disease Father      Prostate Cancer Father      Breast Cancer Maternal Grandmother 61         Social History           Tobacco Use    Smoking status: Never Smoker    Smokeless tobacco: Never Used   Vaping Use    Vaping Use: Never used   Substance Use Topics    Alcohol use: No    Drug use: No            Allergies   Allergen Reactions    Furosemide Other (See Comments)       States her kidneys shut down  Other reaction(s): Unknown            Review of Systems   Constitutional: Negative for fever, chills, diaphoresis, appetite change and fatigue. HENT: Negative for dental issues, hearing loss and tinnitus. Negative for congestion, sinus pressure, sneezing, sore throat. Negative for headache. Eyes: Negative for visual disturbance, blurred and double vision. Negative for pain, discharge, redness and itching  Respiratory: Negative for cough, shortness of breath and wheezing. Cardiovascular: Negative for chest pain, palpitations and leg swelling. No dyspnea on exertion   Gastrointestinal:   Negative for nausea, vomiting, abdominal pain, diarrhea, constipation  or black or bloody. Hematologic\Lymphatic:  negative for bleeding, petechiae,   Genitourinary: Negative for hematuria and difficulty urinating. Musculoskeletal: Negative for neck pain and stiffness. Negative for back pain, see HPI  Skin: Negative for pallor, rash and wound. Neurological: Negative for dizziness, tremors, seizures, weakness, light-headedness, no TIA or stroke symptoms. No numbness and headaches. Psychiatric/Behavioral: Negative.         OBJECTIVE:       Physical Examination:   General appearance: alert, well appearing, and in no distress,  normal appearing weight.  No visible signs of trauma   Mental status: alert, oriented to person, place, and time, normal mood, behavior, speech, dress, motor activity, and thought processes  Abdomen: soft, nondistended  Resp:   resp easy and unlabored, no audible wheezes note, normal symmetrical expansion of both hemithoraces  Cardiac: distal pulses palpable, skin and extremities well perfused  Neurological: alert, oriented X3, normal speech, no focal findings or movement disorder noted, motor and sensory grossly normal bilaterally, normal muscle tone, no tremors, strength 5/5, normal gait and station  HEENT: normochephalic atraumatic, external ears and eyes normal, sclera normal, neck supple, no nasal discharge. Extremities:   peripheral pulses normal, no edema, redness or tenderness in the calves   Skin: normal coloration, no rashes or open wounds, no suspicious skin lesions noted  Psych: Affect euthymic   Musculoskeletal:   Extremity:  Left third digit  Maceration of tissue with exposed nailbed and drainage at the distal phalanx  Is able to flex and touch her fingertips to her palm  No swelling proximal to the DIP joint  No tenderness over the flexor tendon  2/4 radial pulse  Compartment soft compressible  Minimal sensation of the third digit does have gross sensation intact other digits  No proximal tenderness palpation     Temp 98.2 °F (36.8 °C)      XR: X-rays reviewed from 1/5/2022 showing erosion of the distal phalanx of left third digit consistent with osteomyelitis, overall multiple joints with osteoarthritis.        ASSESSMENT:       Diagnosis Orders   1. Osteomyelitis of finger of left hand (HCC)        Left third digit distal phalanx          Discussion: Talked the patient detail about the fact that an amputation would be the best surgery for her distal phalanx. I explained that should remove for nice infection. She is unable to get an MRI I did explain there is a chance that infection could be proximal under middle phalanx but is not grossly infected on x-ray. I think for the sake of her getting her heart surgery and amputation will be the quickest way to remove the nidus of infection.   I also explained that she could have issues with healing the wound from her amputation she understands this. She may need further shortening or amputations in the future she understands this as well. Went to the risk of surgery including death and anesthesia, further infection, further need for operations, wound healing issues, and any other unforeseeable complications.   The patient understood decided to go forward with amputation of the distal phalanx of the left third digit.     PLAN:  Amputation left third digit distal phalanx for osteomyelitis     Follow-up for surgery     Call with any questions or concerns

## 2022-01-18 NOTE — PROGRESS NOTES
[address] [facility] PRE-ADMISSION TESTING GENERAL INSTRUCTIONS- [facility]-phone number:[phone]    GENERAL INSTRUCTIONS  [x] Antibacterial Soap shower Night before and/or AM of Surgery  [] Curt wipe instruction sheet and wipes given. [x] Nothing by mouth after midnight, including gum, candy, mints, or water. [x] You may brush your teeth, gargle, but do NOT swallow water. []Hibiclens shower  the night before and the morning of surgery. Do not use             Hibiclens on your face or head. [x]No smoking, chewing tobacco, illegal drugs, or alcohol within 24 hours of your surgery. [x] Jewelry, valuables or body piercing's should not be brought to the hospital. All body and/or tongue piercing's must be removed prior to arriving to hospital.  ALL hair pins must be removed. [x] Do not wear makeup, lotions, powders, deodorant. Nail polish as directed by the nurse. [x] Arrange transportation with a responsible adult  to and from the hospital. If you do not have a responsible adult  to transport you, you will need to make arrangements with a medical transportation company (i.e. [employer]. A Uber/taxi/bus is not appropriate unless you are accompanied by a responsible adult ). Arrange for someone to be with you for the remainder of the day and for 24 hours after your procedure due to having had anesthesia. Who will be your  for transportation? __sister________________   Who will be staying with you for 24 hrs after your procedure?____sister______________  [x] Bring insurance card and photo ID.  [] Transfusion Bracelet: Please bring with you to hospital, day of surgery  [] Bring urine specimen day of surgery. Any small container is acceptable. [] Use inhalers the morning of surgery and bring with you to hospital.  [] Bring copy of living will or healthcare power of  papers to be placed in your electronic record.   [] CPAP/BI-PAP: Please bring your machine if you are to spend the night in the hospital.     PARKING INSTRUCTIONS:   [x] Arrival Time:__0830, wear mask___________  · [x] Parking lot '\"I\"  is located on [facility] (the corner of [facility] and [facility]). To enter, press the button and the gate will lift. A free token will be provided to exit the lot. One car per patient is allowed to park in this lot. All other cars are to park on [address] [place] [address] either in the parking garage or the handicap lot. [] To reach the [facility] lobby from [address] [place] [address], upon entering the hospital, take elevator B to the 3rd floor. EDUCATION INSTRUCTIONS:      [] Knee or hip replacement booklet & exercise pamphlets given. [] [address] placed in chart. [] Pre-admission Testing educational folder given  [] Incentive Spirometry,coughing & deep breathing exercises reviewed. []Medication information sheet(s)   []Fluoroscopy-Xray used in surgery reviewed with patient. Educational pamphlet placed in chart. [x]Pain: Post-op pain is normal and to be expected. You will be asked to rate your pain from 0-10(a zero is not acceptable-education is needed). Your post-op pain goal is:  [x] Ask your nurse for your pain medication. [] Joint camp offered. [] Joint replacement booklets given. [] Other:___________________________    MEDICATION INSTRUCTIONS:   [x]Bring a complete list of your medications, please write the last time you took the medicine, give this list to the nurse. [x] Take the following medications the morning of surgery with 1-2 ounces of water:  See list  [x] Stop herbal supplements and vitamins 5 days before your surgery. [x] DO NOT take any diabetic medicine the morning of surgery. Follow instructions for insulin the day before surgery. [x] If you are diabetic and your blood sugar is low or you feel symptomatic, you may drink 1-2 ounces of apple juice or take a glucose tablet.   The morning of your procedure, you may call the pre-op area if you have concerns about your blood sugar [phone]. [x] Use your inhalers the morning of surgery. Bring your emergency inhaler with you day of surgery. [x] Follow physician instructions regarding any blood thinners you may be taking. WHAT TO EXPECT:  [x] The day of surgery you will be greeted and checked in by the [facility] [employer] [facility].  In addition, you will be registered in the [place] by a Patient Access Representative. Please bring your photo ID and insurance card. A nurse will greet you in accordance to the time you are needed in the pre-op area to prepare you for surgery. Please do not be discouraged if you are not greeted in the order you arrive as there are many variables that are involved in patient preparation. Your patience is greatly appreciated as you wait for your nurse. Please bring in items such as: books, magazines, newspapers, electronics, or any other items  to occupy your time in the waiting area. [x]  Delays may occur with surgery and staff will make a sincere effort to keep you informed of delays. If any delays occur with your procedure, we apologize ahead of time for your inconvenience as we recognize the value of your time.

## 2022-01-19 ENCOUNTER — ANESTHESIA EVENT (OUTPATIENT)
Dept: OPERATING ROOM | Age: 67
End: 2022-01-19
Payer: COMMERCIAL

## 2022-01-20 ENCOUNTER — HOSPITAL ENCOUNTER (OUTPATIENT)
Age: 67
Setting detail: OUTPATIENT SURGERY
Discharge: HOME | End: 2022-01-20
Attending: ORTHOPAEDIC SURGERY | Admitting: ORTHOPAEDIC SURGERY
Payer: COMMERCIAL

## 2022-01-20 ENCOUNTER — ANESTHESIA (OUTPATIENT)
Dept: OPERATING ROOM | Age: 67
End: 2022-01-20
Payer: COMMERCIAL

## 2022-01-20 VITALS — DIASTOLIC BLOOD PRESSURE: 74 MMHG | OXYGEN SATURATION: 99 % | SYSTOLIC BLOOD PRESSURE: 165 MMHG

## 2022-01-20 VITALS
WEIGHT: 181 LBS | OXYGEN SATURATION: 92 % | HEART RATE: 62 BPM | TEMPERATURE: 97.1 F | BODY MASS INDEX: 25.91 KG/M2 | SYSTOLIC BLOOD PRESSURE: 160 MMHG | HEIGHT: 70 IN | RESPIRATION RATE: 18 BRPM | DIASTOLIC BLOOD PRESSURE: 75 MMHG

## 2022-01-20 DIAGNOSIS — Z98.890 HISTORY OF SURGERY: ICD-10-CM

## 2022-01-20 DIAGNOSIS — Z01.812 PRE-OPERATIVE LABORATORY EXAMINATION: Primary | ICD-10-CM

## 2022-01-20 LAB
ALBUMIN SERPL-MCNC: 4.1 G/DL (ref 3.5–5.2)
ALP BLD-CCNC: 98 U/L (ref 35–104)
ALT SERPL-CCNC: 19 U/L (ref 0–32)
ANION GAP SERPL CALCULATED.3IONS-SCNC: 11 MMOL/L (ref 7–16)
APTT: 38 SEC (ref 24.5–35.1)
AST SERPL-CCNC: 18 U/L (ref 0–31)
BASOPHILS ABSOLUTE: 0.04 E9/L (ref 0–0.2)
BASOPHILS RELATIVE PERCENT: 1 % (ref 0–2)
BILIRUB SERPL-MCNC: 0.4 MG/DL (ref 0–1.2)
BUN BLDV-MCNC: 20 MG/DL (ref 6–23)
C-REACTIVE PROTEIN: 0.7 MG/DL (ref 0–0.4)
CALCIUM SERPL-MCNC: 9.5 MG/DL (ref 8.6–10.2)
CHLORIDE BLD-SCNC: 100 MMOL/L (ref 98–107)
CO2: 29 MMOL/L (ref 22–29)
CREAT SERPL-MCNC: 3.9 MG/DL (ref 0.5–1)
EOSINOPHILS ABSOLUTE: 0.18 E9/L (ref 0.05–0.5)
EOSINOPHILS RELATIVE PERCENT: 4.7 % (ref 0–6)
GFR AFRICAN AMERICAN: 14
GFR NON-AFRICAN AMERICAN: 14 ML/MIN/1.73
GLUCOSE BLD-MCNC: 76 MG/DL (ref 74–99)
HCT VFR BLD CALC: 30.4 % (ref 34–48)
HEMOGLOBIN: 9.4 G/DL (ref 11.5–15.5)
IMMATURE GRANULOCYTES #: 0.02 E9/L
IMMATURE GRANULOCYTES %: 0.5 % (ref 0–5)
INR BLD: 1
LYMPHOCYTES ABSOLUTE: 1.08 E9/L (ref 1.5–4)
LYMPHOCYTES RELATIVE PERCENT: 28 % (ref 20–42)
MCH RBC QN AUTO: 28.9 PG (ref 26–35)
MCHC RBC AUTO-ENTMCNC: 30.9 % (ref 32–34.5)
MCV RBC AUTO: 93.5 FL (ref 80–99.9)
METER GLUCOSE: 63 MG/DL (ref 74–99)
MONOCYTES ABSOLUTE: 0.56 E9/L (ref 0.1–0.95)
MONOCYTES RELATIVE PERCENT: 14.5 % (ref 2–12)
NEUTROPHILS ABSOLUTE: 1.98 E9/L (ref 1.8–7.3)
NEUTROPHILS RELATIVE PERCENT: 51.3 % (ref 43–80)
PDW BLD-RTO: 18.2 FL (ref 11.5–15)
PLATELET # BLD: 170 E9/L (ref 130–450)
PMV BLD AUTO: 10 FL (ref 7–12)
POTASSIUM REFLEX MAGNESIUM: 3.8 MMOL/L (ref 3.5–5)
PROTHROMBIN TIME: 11.1 SEC (ref 9.3–12.4)
RBC # BLD: 3.25 E12/L (ref 3.5–5.5)
SEDIMENTATION RATE, ERYTHROCYTE: 19 MM/HR (ref 0–20)
SODIUM BLD-SCNC: 140 MMOL/L (ref 132–146)
TOTAL PROTEIN: 6.7 G/DL (ref 6.4–8.3)
WBC # BLD: 3.9 E9/L (ref 4.5–11.5)

## 2022-01-20 PROCEDURE — 7100000010 HC PHASE II RECOVERY - FIRST 15 MIN: Performed by: ORTHOPAEDIC SURGERY

## 2022-01-20 PROCEDURE — 7100000011 HC PHASE II RECOVERY - ADDTL 15 MIN: Performed by: ORTHOPAEDIC SURGERY

## 2022-01-20 PROCEDURE — 3600000012 HC SURGERY LEVEL 2 ADDTL 15MIN: Performed by: ORTHOPAEDIC SURGERY

## 2022-01-20 PROCEDURE — 88305 TISSUE EXAM BY PATHOLOGIST: CPT

## 2022-01-20 PROCEDURE — 85610 PROTHROMBIN TIME: CPT

## 2022-01-20 PROCEDURE — 85025 COMPLETE CBC W/AUTO DIFF WBC: CPT

## 2022-01-20 PROCEDURE — 86140 C-REACTIVE PROTEIN: CPT

## 2022-01-20 PROCEDURE — 3700000000 HC ANESTHESIA ATTENDED CARE: Performed by: ORTHOPAEDIC SURGERY

## 2022-01-20 PROCEDURE — 88311 DECALCIFY TISSUE: CPT

## 2022-01-20 PROCEDURE — 6360000002 HC RX W HCPCS: Performed by: NURSE ANESTHETIST, CERTIFIED REGISTERED

## 2022-01-20 PROCEDURE — 3700000001 HC ADD 15 MINUTES (ANESTHESIA): Performed by: ORTHOPAEDIC SURGERY

## 2022-01-20 PROCEDURE — 2500000003 HC RX 250 WO HCPCS: Performed by: ORTHOPAEDIC SURGERY

## 2022-01-20 PROCEDURE — 36415 COLL VENOUS BLD VENIPUNCTURE: CPT

## 2022-01-20 PROCEDURE — 3600000002 HC SURGERY LEVEL 2 BASE: Performed by: ORTHOPAEDIC SURGERY

## 2022-01-20 PROCEDURE — 26951 AMPUTATION OF FINGER/THUMB: CPT | Performed by: ORTHOPAEDIC SURGERY

## 2022-01-20 PROCEDURE — 80053 COMPREHEN METABOLIC PANEL: CPT

## 2022-01-20 PROCEDURE — 2709999900 HC NON-CHARGEABLE SUPPLY: Performed by: ORTHOPAEDIC SURGERY

## 2022-01-20 PROCEDURE — 85730 THROMBOPLASTIN TIME PARTIAL: CPT

## 2022-01-20 PROCEDURE — 85651 RBC SED RATE NONAUTOMATED: CPT

## 2022-01-20 PROCEDURE — 82962 GLUCOSE BLOOD TEST: CPT

## 2022-01-20 PROCEDURE — 2580000003 HC RX 258: Performed by: NURSE ANESTHETIST, CERTIFIED REGISTERED

## 2022-01-20 RX ORDER — SODIUM CHLORIDE 9 MG/ML
25 INJECTION, SOLUTION INTRAVENOUS PRN
Status: DISCONTINUED | OUTPATIENT
Start: 2022-01-20 | End: 2022-01-20 | Stop reason: HOSPADM

## 2022-01-20 RX ORDER — SODIUM CHLORIDE 0.9 % (FLUSH) 0.9 %
10 SYRINGE (ML) INJECTION PRN
Status: DISCONTINUED | OUTPATIENT
Start: 2022-01-20 | End: 2022-01-20 | Stop reason: HOSPADM

## 2022-01-20 RX ORDER — CEFAZOLIN SODIUM 1 G/3ML
INJECTION, POWDER, FOR SOLUTION INTRAMUSCULAR; INTRAVENOUS PRN
Status: DISCONTINUED | OUTPATIENT
Start: 2022-01-20 | End: 2022-01-20 | Stop reason: SDUPTHER

## 2022-01-20 RX ORDER — HYDROCODONE BITARTRATE AND ACETAMINOPHEN 5; 325 MG/1; MG/1
1 TABLET ORAL PRN
Status: DISCONTINUED | OUTPATIENT
Start: 2022-01-20 | End: 2022-01-20 | Stop reason: HOSPADM

## 2022-01-20 RX ORDER — HYDROCODONE BITARTRATE AND ACETAMINOPHEN 5; 325 MG/1; MG/1
1 TABLET ORAL EVERY 4 HOURS PRN
Qty: 15 TABLET | Refills: 0 | Status: SHIPPED | OUTPATIENT
Start: 2022-01-20 | End: 2022-01-23

## 2022-01-20 RX ORDER — ONDANSETRON 2 MG/ML
4 INJECTION INTRAMUSCULAR; INTRAVENOUS
Status: DISCONTINUED | OUTPATIENT
Start: 2022-01-20 | End: 2022-01-20 | Stop reason: HOSPADM

## 2022-01-20 RX ORDER — HYDROCODONE BITARTRATE AND ACETAMINOPHEN 5; 325 MG/1; MG/1
2 TABLET ORAL PRN
Status: DISCONTINUED | OUTPATIENT
Start: 2022-01-20 | End: 2022-01-20 | Stop reason: HOSPADM

## 2022-01-20 RX ORDER — SODIUM CHLORIDE 0.9 % (FLUSH) 0.9 %
10 SYRINGE (ML) INJECTION EVERY 12 HOURS SCHEDULED
Status: DISCONTINUED | OUTPATIENT
Start: 2022-01-20 | End: 2022-01-20 | Stop reason: HOSPADM

## 2022-01-20 RX ORDER — LIDOCAINE HYDROCHLORIDE 10 MG/ML
INJECTION, SOLUTION EPIDURAL; INFILTRATION; INTRACAUDAL; PERINEURAL PRN
Status: DISCONTINUED | OUTPATIENT
Start: 2022-01-20 | End: 2022-01-20 | Stop reason: ALTCHOICE

## 2022-01-20 RX ORDER — DEXTROSE MONOHYDRATE 50 MG/ML
INJECTION, SOLUTION INTRAVENOUS CONTINUOUS PRN
Status: DISCONTINUED | OUTPATIENT
Start: 2022-01-20 | End: 2022-01-20 | Stop reason: SDUPTHER

## 2022-01-20 RX ORDER — FENTANYL CITRATE 50 UG/ML
25 INJECTION, SOLUTION INTRAMUSCULAR; INTRAVENOUS EVERY 5 MIN PRN
Status: DISCONTINUED | OUTPATIENT
Start: 2022-01-20 | End: 2022-01-20 | Stop reason: HOSPADM

## 2022-01-20 RX ORDER — PROPOFOL 10 MG/ML
INJECTION, EMULSION INTRAVENOUS CONTINUOUS PRN
Status: DISCONTINUED | OUTPATIENT
Start: 2022-01-20 | End: 2022-01-20 | Stop reason: SDUPTHER

## 2022-01-20 RX ORDER — FENTANYL CITRATE 50 UG/ML
INJECTION, SOLUTION INTRAMUSCULAR; INTRAVENOUS PRN
Status: DISCONTINUED | OUTPATIENT
Start: 2022-01-20 | End: 2022-01-20 | Stop reason: SDUPTHER

## 2022-01-20 RX ADMIN — FENTANYL CITRATE 20 MCG: 50 INJECTION, SOLUTION INTRAMUSCULAR; INTRAVENOUS at 10:31

## 2022-01-20 RX ADMIN — PROPOFOL 25 MCG/KG/MIN: 10 INJECTION, EMULSION INTRAVENOUS at 10:20

## 2022-01-20 RX ADMIN — CEFAZOLIN 2000 MG: 1 INJECTION, POWDER, FOR SOLUTION INTRAMUSCULAR; INTRAVENOUS at 10:30

## 2022-01-20 RX ADMIN — FENTANYL CITRATE 20 MCG: 50 INJECTION, SOLUTION INTRAMUSCULAR; INTRAVENOUS at 10:20

## 2022-01-20 RX ADMIN — DEXTROSE MONOHYDRATE: 50 INJECTION, SOLUTION INTRAVENOUS at 10:15

## 2022-01-20 ASSESSMENT — PULMONARY FUNCTION TESTS
PIF_VALUE: 0
PIF_VALUE: 1
PIF_VALUE: 0

## 2022-01-20 ASSESSMENT — LIFESTYLE VARIABLES: SMOKING_STATUS: 0

## 2022-01-20 ASSESSMENT — ENCOUNTER SYMPTOMS: DYSPNEA ACTIVITY LEVEL: AFTER AMBULATING 1 FLIGHT OF STAIRS

## 2022-01-20 ASSESSMENT — PAIN SCALES - GENERAL
PAINLEVEL_OUTOF10: 0

## 2022-01-20 ASSESSMENT — PAIN - FUNCTIONAL ASSESSMENT: PAIN_FUNCTIONAL_ASSESSMENT: 0-10

## 2022-01-20 NOTE — PROGRESS NOTES
Patients blood glucose 63. Dr Osmar Hensley was notified. D5 LR was placed at bedside per Dr. Osmar Hensley orders.

## 2022-01-20 NOTE — ANESTHESIA POSTPROCEDURE EVALUATION
Department of Anesthesiology  Postprocedure Note    Patient: Sally Yates  MRN: [id number]  YOB: 1955  Date of evaluation: 1/20/2022  Time:  11:01 AM     Procedure Summary     Date: 01/20/22 Room / Location: [facility] 09 / [facility]    Anesthesia Start: 2179 Anesthesia Stop: 1059    Procedure: AMPUTATION OF LEFT THIRD DIGIT, DISTAL PHALANX (Left Fingers) Diagnosis: (OSTEOMYELITIS)    Surgeons: Derek Blake MD Responsible Provider: Yesy Vazquez MD    Anesthesia Type: MAC ASA Status: 4          Anesthesia Type: MAC    Nikki Phase I: Nikki Score: 10    Nikki Phase II:      Last vitals: Reviewed and per EMR flowsheets.        Anesthesia Post Evaluation    Patient location during evaluation: PACU  Patient participation: complete - patient participated  Level of consciousness: awake  Pain score: 0  Airway patency: patent  Nausea & Vomiting: no nausea  Complications: no  Cardiovascular status: hemodynamically stable  Respiratory status: acceptable  Hydration status: stable

## 2022-01-20 NOTE — PROGRESS NOTES
Discharge instructions provided to patient, written and verbal, patient verbalized understanding. Sister at bedside.

## 2022-01-20 NOTE — INTERVAL H&P NOTE
Update History & Physical    The patient's History and Physical of January 13, 2022 was reviewed with the patient and I examined the patient. There was no change. The surgical site was confirmed by the patient and me. Plan: The risks, benefits, expected outcome, and alternative to the recommended procedure have been discussed with the patient. Patient understands and wants to proceed with the procedure.      Electronically signed by Rhoda Fuentes MD on 1/20/2022 at 9:24 AM

## 2022-01-20 NOTE — OP NOTE
Operative Note      Patient: Diego Arita  YOB: 1955  MRN: [id number]    Date of Procedure: 1/20/2022    Pre-Op Diagnosis: Osteomyelitis left third digit distal phalanx    Post-Op Diagnosis: Same         Procedure:  Amputation left third digit distal phalanx with direct closure      Surgeon(s):  Xiomara Pereira MD    Assistant:   Resident: Mariel Haynes DO; Sia Parada DO; Annette Mar DO    Anesthesia: Monitor Anesthesia Care    Estimated Blood Loss (mL): Minimal    Complications: None    Specimens:   * No specimens in log *    Implants:  * No implants in log *      Drains: * No LDAs found *    Findings: Amputation performed with tension-free closure of the distal phalanx left third digit    Detailed Description of Procedure:   Patient was brought to the operating room in a supine position on hospital bed. Patient was transferred to the operating room table by multiple individuals in the safe fashion with anesthesia and control the patient C-spine and airway. Once in the operating room table all points pressure identified well-padded. An armboard was brought to her left side. Her left upper extremity sterilely prepped and draped in the standard orthopedic fashion. A timeout was performed indicating the appropriate identification of the patient, the procedure to be performed, and the side to be performed upon. This was agreed upon by all individuals in the room. At this point time we performed a digital block with 10 cc of 1% lidocaine without epinephrine. After the digital block was achieved we then marked out a fishmouth incision near the distal phalanx, DIP joint. We then made nice thick skin flaps and made the incision. We shelled the distal phalanx out and removed it. We then cleaned up the cartilage from the distal portion of the middle phalanx as well as from the condyles back to give it a more smooth contour.   Once this was achieved we then copiously irrigated with sterile normal saline. After irrigation we controlled any bleeding with electrocautery. We then closed the amputation site with 2-0 Monocryl with simple sutures. Once nicely closed it was covered with bacitracin Xeroform and a sterile dressing. Patient was reversed of anesthesia without complication taken to the PACU in stable condition.       Postoperative plan:  No heavy lifting pushing pulling left hand    Follow-up in the office in 10 to 14 days for a wound check and suture removal    Call sooner with any questions or concerns    Electronically signed by Janice Figueroa MD on 1/20/2022 at 10:44 AM

## 2022-01-20 NOTE — ANESTHESIA PRE PROCEDURE
Department of Anesthesiology  Preprocedure Note       Name:  Rosana Casarez   Age:  78 y.o.  :  1955                                          MRN:  [id number]         Date:  2022      Surgeon: Conner Fields):  Aviva Watson MD    Procedure: Procedure(s):  AMPUTATION OF LEFT THIRD DIGIT, DISTAL PHALANX - ALSO WITH REGIONAL AND LMAC    Medications prior to admission:   Prior to Admission medications    Medication Sig Start Date End Date Taking? Authorizing Provider   LANBEATRIS SOLOSTAR 100 UNIT/ML injection pen inject 11 units into the skin nightly 21   Giuseppe Schwab MD   isosorbide mononitrate (IMDUR) 30 MG extended release tablet TAKE ONE TABLET BY MOUTH DAILY 21   Giuseppe Schwab MD   metoprolol succinate (TOPROL XL) 25 MG extended release tablet Take 1 tablet by mouth daily 21   Giuseppe Schwab MD   gabapentin (NEURONTIN) 100 MG capsule Take 1 capsule by mouth 3 times daily for 180 days.  Intended supply: 30 days 21  [facility] Jose Ramon Rust MD   bumetanitony Scott) 2 MG tablet take 1 tablet by mouth 3 times a week on , tuesday and 21   Giuseppe Schwab MD   lanthanum (FOSRENOL) 1000 MG chewable tablet CHEW AND SWALLOW ONE TABLET BY MOUTH THREE TIMES DAILY WITH MEALS 21   Historical Provider, MD   allopurinol (ZYLOPRIM) 100 MG tablet Take 1 tablet by mouth daily 21   Giuseppe Schwab MD   atorvastatin (LIPITOR) 40 MG tablet Take 1 tablet by mouth daily 21   Giuseppe Schwab MD   Insulin Pen Needle (ULTICARE MINI PEN NEEDLES) 31G X 6 MM MISC USE 1 PEN NEEDLE 4 times daily 21   Giuseppe Schwab MD   blood glucose test strips (ASCENSIA AUTODISC VI;ONE TOUCH ULTRA TEST VI) strip 1 each by In Vitro route 2 times daily 21   Giuseppe Schwab MD   ticagrelor (BRILINTA) 90 MG TABS tablet Take 1 tablet by mouth 2 times daily 21   Giuseppe Schwab MD   aspirin 81 MG EC tablet Take 1 tablet by mouth daily 21   Amador Holm Patrica Eubanks MD   hydrALAZINE (APRESOLINE) 10 MG tablet Take 1 tablet by mouth every 8 hours  Patient taking differently: Take 10 mg by mouth 2 times daily  5/25/21   Miguelito Hu MD   midodrine (PROAMATINE) 5 MG tablet Take 1 tablet by mouth as needed (may repeat x1 durring HD for assymptomatic SBP<100) 5/25/21   Miguelito Hu MD   B Complex-C-Folic Acid (BONI CAPS) 1 MG CAPS Take 1 mg by mouth daily    Historical Provider, MD   omeprazole (PRILOSEC) 20 MG delayed release capsule Take 20 mg by mouth nightly    Historical Provider, MD MCHUGH 0.01 % SOLN ophthalmic drops Place 1 drop into the right eye nightly  6/9/20   Historical Provider, MD   COMBIGAN 0.2-0.5 % ophthalmic solution Place 1 drop into the right eye every 12 hours  8/1/19   Historical Provider, MD       Current medications:    No current facility-administered medications for this visit. No current outpatient medications on file. Facility-Administered Medications Ordered in Other Visits   Medication Dose Route Frequency Provider Last Rate Last Admin    0.9 % sodium chloride infusion  25 mL IntraVENous PRN Kelsie Jennifer, PA-C        sodium chloride flush 0.9 % injection 10 mL  10 mL IntraVENous 2 times per day Kelsie Jennifer, PA-C        sodium chloride flush 0.9 % injection 10 mL  10 mL IntraVENous PRN Kelsie Jennifer, PA-C           Allergies:     Allergies   Allergen Reactions    Furosemide Other (See Comments)     States her kidneys shut down  Other reaction(s): Unknown       Problem List:    Patient Active Problem List   Diagnosis Code    Diabetic retinopathy ([facility] E11.319    Malignant neoplasm of right female breast ([facility] C50.911    Atherosclerosis of native coronary artery of native heart without angina pectoris I25.10    Moderate obesity E66.8    Left ventricular hypertrophy I51.7    Herniated lumbar intervertebral disc M51.26    Lumbar degenerative disc disease M51.36    Pseudomeningocele G96.198    Lumbar radiculopathy M54.16    Lymphedema of arm I89.0    CKD (chronic kidney disease) stage 4, GFR 15-29 ml/min ([facility]) N18.4    Insulin dependent type 2 diabetes mellitus ([facility]) E11.9, Z79.4    Anemia of chronic disease D63.8    Chronic diastolic CHF (congestive heart failure) ([facility]) I50.32    Neuropathy G62.9    Hypertension I10    Glaucoma H40.9    Refusal of blood product Z78.9    Pancytopenia ([facility]) D61.818    Controlled type 2 diabetes mellitus with chronic kidney disease on chronic dialysis, with long-term current use of insulin ([facility]) E11.22, N18.6, Z79.4, Z99.2    Vitreous hemorrhage ([facility] H43.10    Patient is Yazidi Z78.9    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 ([address] E11.649    Hyperkalemia, diminished renal excretion E87.5    Chronic pain syndrome G89.4    Lumbar post-laminectomy syndrome M96.1    Myalgia M79.10    Cervicalgia M54.2    Diabetic peripheral neuropathy ([facility]) E11.42    ESRD (end stage renal disease) ([facility]) N18.6    Bilateral carpal tunnel syndrome G56.03    Spinal stenosis of lumbar region with neurogenic claudication M48.062    Cardiac arrest ([facility]) I46.9    ESRD (end stage renal disease) on dialysis ([facility]) N18.6, Z99.2    Mixed hyperlipidemia E78.2    Lymphedema of right upper extremity I89.0    Coronary artery disease involving native coronary artery of native heart with angina pectoris ([facility]) I25.119    Ventricular tachycardia ([facility]) I47.2    Mitral valve disease I05.9    CAD in native artery I25.10       Past Medical History:        Diagnosis Date    Acute infection of bone ([facility])     infection of rt foot, resolved.     Anemia of chronic disease     Arthritis     Breast cancer ([address]     right breast, 2008/ bladder, 2006- last chemotherapy \"years ago\"    CAD (coronary artery disease)     Carpal tunnel syndrome     bilat - for OR left hand 3-17-20     Chronic diastolic CHF (congestive heart failure) ([address] 09/23/2014 9/23/14- echocardiogram revealed moderate LV concentric hypertrophy, stage III diastolic dysfunction, mild tricuspid regurgitation    CKD (chronic kidney disease) stage 4, GFR 15-29 ml/min ([facility])     Diabetic retinopathy ([address]     Glaucoma     Hemodialysis patient ([address]     [facility] wed fri- Dr. Chayo Catalan - left arm fistula     Hyperkalemia, diminished renal excretion 11/9/2017    Hyperlipidemia     Hypertension     Hypoglycemia unawareness in type 1 diabetes mellitus ([address] 11/7/2017    Insulin dependent type 2 diabetes mellitus ([address]     Neuropathy     feet    Osteomyelitis due to secondary diabetes ([address]     rt great toe with amputation    Patient is Confucianist 11/7/2017    Refusal of blood product     patient states she dose not take blood transfusion    Ventricular hypertrophy     Vitreous hemorrhage ([address]     left eye       Past Surgical History:        Procedure Laterality Date    AMPUTATION      right great toe    ANKLE SURGERY      correction on charcot joint of right ankle    CARDIAC CATHETERIZATION  12/09/2021    Dr Sanders Cornea Left 3/17/2020    LEFT CARPAL TUNNEL RELEASE performed by Patrizia White MD at [address]      bilateral    CHOLECYSTECTOMY      COLONOSCOPY      CYSTOSCOPY     [address] Left 01/31/2018    upper arm/Dr. Fiona Spencer    ECHO COMPL W DOP COLOR FLOW  2/14/2013         ECHO COMPLETE  9/17/2013         MASTECTOMY      right    OTHER SURGICAL HISTORY  9/27/2011    PPV, membranectomy, laser Right eye    OTHER SURGICAL HISTORY  insertion lumbar drain insertion    10/12/`14    OTHER SURGICAL HISTORY  10/22/15    percutaneous lead placement for spinal cord stimulator    OTHER SURGICAL HISTORY  11/03/2015    Spinal; cord stimulator- turned off as of 3-10-20     ID AV ANAST,UP ARM BASILIC VEIN TRANSPOSIT Left 5/15/2018    TRANSPOSITION STAGE II AV FISTULA - LEFT UPPER ARM performed by Renetta Murrell MD at [address] [facility] ANGIOACCESS AV FISTULA Left 9/25/2018    SUPERFICIALIZATION AV FISTULA - LEFT ARM performed by Renetta Murrell MD at [facility] W/VITRECTOMY ANY METH Left 4/10/2018    PARS PLANA VITRECTOMY 25 GAUGE RETINAL DETACHMENT REPAIR air fluid exchange, endolaser performed by Genny Donnelly MD at [address] TRANSESOPHAGEAL ECHOCARDIOGRAM  11/11/2021    Dr. Ludmila Steven TUNNELED [address]  11/15/2017    VITRECTOMY Left 04/10/2018    PARS PLANA VITRECTOMY; RETINAL DETACHMENT REPAIR; GAS BUBBLE; LASER LEFT EYE       Social History:    Social History     Tobacco Use    Smoking status: Never Smoker    Smokeless tobacco: Never Used   Substance Use Topics    Alcohol use: No                                Counseling given: Not Answered      Vital Signs (Current): There were no vitals filed for this visit.                                            BP Readings from Last 3 Encounters:   01/20/22 139/65   01/04/22 (!) 113/57   12/20/21 (!) 161/69       NPO Status:    Last solid consumption before 2359 on 11/10/21  Sips with meds at 0600 on 11/11/21                                                                             BMI:   Wt Readings from Last 3 Encounters:   01/20/22 181 lb (82.1 kg)   01/05/22 182 lb (82.6 kg)   01/04/22 181 lb (82.1 kg)     There is no height or weight on file to calculate BMI.    CBC:   Lab Results   Component Value Date    WBC 3.6 12/20/2021    RBC 3.33 12/20/2021    HGB 9.8 12/20/2021    HCT 30.3 12/20/2021    MCV 91.0 12/20/2021    RDW 16.4 12/20/2021     12/20/2021       CMP:   Lab Results   Component Value Date     12/20/2021    K 4.2 12/20/2021    K 3.9 05/19/2021    CL 99 12/20/2021    CO2 31 12/20/2021    BUN 14 12/20/2021    CREATININE 3.1 12/20/2021    GFRAA 18 12/20/2021    LABGLOM 18 12/20/2021    GLUCOSE 96 12/20/2021    GLUCOSE 46 04/02/2012    PROT 6.8 12/02/2021    CALCIUM 9.4 12/20/2021    BILITOT 0.8 12/02/2021    ALKPHOS 111 12/02/2021    AST 27 12/02/2021    ALT 31 12/02/2021       POC Tests: No results for input(s): POCGLU, POCNA, POCK, POCCL, POCBUN, POCHEMO, POCHCT in the last 72 hours.     Coags:   Lab Results   Component Value Date    PROTIME 10.9 12/02/2021    PROTIME 10.4 02/15/2012    INR 1.0 12/02/2021    APTT 92.9 05/21/2021       HCG (If Applicable): No results found for: PREGTESTUR, PREGSERUM, HCG, HCGQUANT     ABGs: No results found for: PHART, PO2ART, ZAG8VUI, HVJ1QRA, BEART, X8UDOPLE     Type & Screen (If Applicable):  Lab Results   Component Value Date    LABABO O 08/30/2011    [facility] POS 08/30/2011       Drug/Infectious Status (If Applicable):  No results found for: HIV, HEPCAB    COVID-19 Screening (If Applicable):   Lab Results   Component Value Date    COVID19 Not Detected 05/19/2021               CXR 5/19/21  FINDINGS:   The lungs are without acute focal process.  There is no effusion or   pneumothorax.  Cardiomegaly.  The osseous structures are without acute   process.  Right-sided port a catheter tip in the SVC.           Impression   No acute process.       Cardiomegaly.                     CARDIAC STRESS TEST 5/20/21  FINDINGS: The overall quality of the study was adequate.       Left ventricular cavity size was noted to be dilated during stress and   rest images       Rotational analog analysis demonstrated show no significant motion   artifact       The gated SPECT stress imaging in the short, vertical long, and   horizontal long axis demonstrated severe defect was present in the   apical wall(s) that was large sized by quantification.              There also was a severe defect present in the septal wall(s) that was   large sized by quantification.       There is also mild defect present in the small inferior apical that   are small in size       There is also a mild defect present in the anterior septal wall that   was small in size     The resting images partially reduced. The anterolateral wall only.       Gated SPECT left ventricular ejection fraction was calculated to be   25%, with apical and septal akinesis, moderate global hypokinesis.         Impression       The myocardial perfusion imaging was abnormal       The abnormality was a large fixed defect in the apical and septal   walls; small fixed defect in the inferoapical wall; partially   reversible defect in the small area of anterolateral wall.       Overall left ventricular systolic function was severely reduced, EF   25% with apical septal akinesis, moderate global hypokinesis. Dilated   left ventricle during stress and rest.       High risk myocardial perfusion study       Compared to previous study from August 2011 which showed large lateral   wall ischemia with EF 66%.                     CARDIAC CATH 5/21/21  CONCLUSION:  1. Coronary artery disease:  a. Left main:  20% eccentric mid vessel narrowing.  b.  LAD:  50% proximal vessel narrowing and 95% mid vessel stenosis. Trivial diagonal branch with 90% stenosis. c.  LCX:  Occluded after a small caliber first marginal branch which is  a chronic total occlusion. The second larger marginal branch filled  late. d.  RCA:  Large, dominant vessel with 90% heavily calcified mid vessel  stenosis. 50% disease at the level of the crux and 70% ostial stenosis  of the posterior descending artery branch. 2.  Markedly elevated left ventricular end-diastolic pressure. 3.  Successful balloon angioplasty with deployment of drug-eluting  coronary stent to the mid LAD with very good results. 4.  Successful balloon angioplasty with deployment of drug-eluting  coronary stent to the mid RCA with poststent deployment dilatation with  a larger high-pressure noncompliant balloon with good results.               EKG 9/28/21  Sinus  Rhythm   -Poor R-wave progression -nonspecific -consider old anterior infarct.     -  Diffuse nonspecific T-abnormality. ABNORMAL                 ECHO 8/24/21 EF 50-55%   Findings      Left Ventricle   Left ventricular size is grossly normal.   Ejection fraction is visually estimated at 50-55%. Biplane EF 58%   Mild left ventricular concentric hypertrophy noted. No regional wall motion abnormalities seen. Left Atrium   The left atrium is mildly to moderately dilated. Mitral Valve   Moderate mitral annular calcification. 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 RVSP is 40 mmHg. Pericardial Effusion   There is a trivial pericardial effusion noted. Conclusions      Summary   Limited study ordered. Left ventricular size is grossly normal.   Ejection fraction is visually estimated at 50-55%. Biplane EF 58%   Mild left ventricular concentric hypertrophy noted. No regional wall motion abnormalities seen. 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 Multiple missing and chipped teeth. Denies loose teeth. Pulmonary:   (+) decreased breath sounds,      (-) not a current smoker          Patient did not smoke on day of surgery.                  Cardiovascular:  Exercise tolerance: poor (<4 METS),   (+) hypertension:, valvular problems/murmurs (papillary fibroelastoma of mitral valve):, CAD:, CABG/stent (cardiac arrest 5/2021 s/p stent x1):, dysrhythmias: ventricular tachycardia, CHF:, FRENCH: after ambulating 1 flight of stairs,     (-)  angina (last c/p in 5/2021)    ECG reviewed  Rhythm: regular  Rate: normal  Echocardiogram reviewed  Stress test reviewed  Cleared by cardiology     Beta Blocker:  Dose within 24 Hrs         Neuro/Psych:   (+) neuromuscular disease (diabetic retinopathy, vitreous hemorrhage of left eye, glaucoma, herniated lumbar intervertebral disc, DDD, pseudomeningocele, chronic pain, myalgia):,             GI/Hepatic/Renal:   (+) renal disease (last IHD 11/10/21): ESRD and dialysis,           Endo/Other:    (+) Diabetes (right great toe amputation d/t osteomyelitis)Type II DM, poorly controlled, using insulin, blood dyscrasia: anemia:., malignancy/cancer (right breast CA 2008). Abdominal:             Vascular: negative vascular ROS. Other Findings: Right arm lymphedema              Anesthesia Plan      MAC     ASA 4       Induction: intravenous. Anesthetic plan and risks discussed with patient. Use of blood products discussed with whom did not consent to blood products. Special considerations: Tenriism. Plan discussed with CRNA and attending. Vida Redmond MD   1/20/2022      Patient seen and examined, chart reviewed, agree with above findings. Anesthetic plan, risks, benefits, alternatives, and personnel involved discussed with patient. Patient verbalized an understanding and agreed to proceed. NPO status confirmed. Anesthetic plan discussed with care team members and agreed upon.     Vida Redmond MD   1/20/2022  9:14 AM

## 2022-01-25 DIAGNOSIS — M86.9 OSTEOMYELITIS OF FINGER OF LEFT HAND (HCC): Primary | ICD-10-CM

## 2022-01-27 PROBLEM — M86.142: Status: ACTIVE | Noted: 2022-01-27

## 2022-01-31 ENCOUNTER — OFFICE VISIT (OUTPATIENT)
Dept: ORTHOPEDIC SURGERY | Age: 67
End: 2022-01-31
Payer: COMMERCIAL

## 2022-01-31 ENCOUNTER — HOSPITAL ENCOUNTER (OUTPATIENT)
Dept: GENERAL RADIOLOGY | Age: 67
Discharge: HOME | End: 2022-02-02
Payer: COMMERCIAL

## 2022-01-31 VITALS — TEMPERATURE: 97.2 F

## 2022-01-31 DIAGNOSIS — M86.9 OSTEOMYELITIS OF FINGER OF LEFT HAND (HCC): ICD-10-CM

## 2022-01-31 DIAGNOSIS — M86.9 OSTEOMYELITIS OF FINGER OF LEFT HAND (HCC): Primary | ICD-10-CM

## 2022-01-31 PROCEDURE — 73140 X-RAY EXAM OF FINGER(S): CPT

## 2022-01-31 PROCEDURE — 99212 OFFICE O/P EST SF 10 MIN: CPT | Performed by: PHYSICIAN ASSISTANT

## 2022-01-31 PROCEDURE — 99024 POSTOP FOLLOW-UP VISIT: CPT | Performed by: PHYSICIAN ASSISTANT

## 2022-01-31 NOTE — PROGRESS NOTES
Patient presents today for 2 week po check, Amputation of LT 3rd Digit Distal Phalanx, DOS 1- by TTS. She states she did have pain in the beginning, however as the days go on she is no longer having pain. Patient states she does not have any questions or concerns at this time. Patient presented with hand ace bandaged.

## 2022-01-31 NOTE — PROGRESS NOTES
Chief Complaint   Patient presents with    Post-Op Check     2 week po check, Amputation of LT 3rd Digit Distal Phalanx, DOS 1- by TTS       OP:SURGEON: Dr. Isis Chino MD  DATE OF PROCEDURE: 1/20/22  PROCEDURE:Amputation left third digit distal phalanx with direct closure    Subjective:  Claudia Kinney is approximately 10 days from DOS. States her pain is minimal at this point, located to amputation site without paresthesias or radiating pain. Does not report drainage to the site. Denies fever, chills, malaise, myalgias. Review of Systems -  all pertinent positives and negatives in HPI. Objective:    General: Alert and oriented X 3, normocephalic atraumatic, external ears and eye normal, sclera clear, no acute distress, respirations easy and unlabored with no audible wheezes, skin warm and dry, speech and dress appropriate for noted age, affect euthymic. Extremity:  Left Upper Extremity  Skin is clean dry and intact  Mild-moderate global L 3rd finger edema noted  Minimal AROM at the PIP joint of 3rd finger, some increased ROM passively without much tenderness   Radial pulse palpable, fingers warm with BCR  Flex/extension intact to wrist, thumb and fingers  Finger opposition intact  Finger adduction/abduction intact   Finger crossover intact  Subjectively states sensation intact to radial/medial/ulnar distribution  Incision well approximated with no redness, drainage or warmth, Monocryl sutures visualized and intact   Minimal TTP about the amputation site      Temp 97.2 °F (36.2 °C) (Oral)     XR:   3 views of L 3rd finger demonstrating s/p distal phalanx amputation. No erosive changes seen to middle phalanx as previously seen to distal phalanx before amputated. No significant change in alignment. No acute fractures or dislocations or any other osseus abnormality identified. Assessment:   Diagnosis Orders   1.  Osteomyelitis of finger of left hand ([address]         Plan:   Reviewed x-rays with patient today in office    No Soaking or submerging incision in water until skin is fully healed. Xeroform + dry gauze placed over amputation site. Keep covered. Can shower, pat dry with clean towel and re-apply new dressings.  WB: Minimal WB through the affected finger to protect healing skin   Continue ROM exercises at home for MCP and PIP joints - can call to set up outpatient OT if needed   Continue monitoring for s/s of infx     F/u 2-3 weeks for wound check     Electronically signed by Mechelle Willingham PA-C on 1/31/2022 at 12:51 PM  Note: This report was completed using [email or web address] voiced recognition software. Every effort has been made to ensure accuracy; however, inadvertent computerized transcription errors may be present.

## 2022-02-03 RX ORDER — LANCETS 30 GAUGE
1 EACH MISCELLANEOUS 2 TIMES DAILY
Qty: 100 EACH | Refills: 5 | Status: SHIPPED
Start: 2022-02-03 | End: 2022-03-09

## 2022-02-03 RX ORDER — HYDRALAZINE HYDROCHLORIDE 10 MG/1
10 TABLET, FILM COATED ORAL 2 TIMES DAILY
Qty: 180 TABLET | Refills: 1 | Status: ON HOLD
Start: 2022-02-03 | End: 2022-05-08 | Stop reason: HOSPADM

## 2022-02-03 NOTE — TELEPHONE ENCOUNTER
Last Appointment:  11/24/2021  Future Appointments   Date Time Provider Dev Arizmendi   2/14/2022 12:15 PM SCHEDULE, [facility]   2/28/2022  1:30 PM SCHEDULE, [facility]   3/15/2022  8:20 AM Franky Ricketts MD [facility]   3/24/2022  8:20 AM Dennys Bone MD [facility]   4/13/2022  2:00 PM Greta Erazo MD [facility]   5/24/2022  7:30 AM Katie Pandya  [address]

## 2022-02-14 ENCOUNTER — OFFICE VISIT (OUTPATIENT)
Dept: ORTHOPEDIC SURGERY | Age: 67
End: 2022-02-14
Payer: COMMERCIAL

## 2022-02-14 VITALS — TEMPERATURE: 98.2 F

## 2022-02-14 DIAGNOSIS — M86.9 OSTEOMYELITIS OF FINGER OF LEFT HAND (HCC): Primary | ICD-10-CM

## 2022-02-14 PROCEDURE — 99024 POSTOP FOLLOW-UP VISIT: CPT | Performed by: PHYSICIAN ASSISTANT

## 2022-02-14 PROCEDURE — 99212 OFFICE O/P EST SF 10 MIN: CPT | Performed by: PHYSICIAN ASSISTANT

## 2022-02-15 NOTE — PROGRESS NOTES
Chief Complaint   Patient presents with    Follow-up     4wk f/u L 3rd distal fing amp. Pain fair, denies numbness or tingling, fever or chills. ROM fair, sutures intact    Other     XR in EPIC       OP:SURGEON: Dr. Randy Redmond MD  DATE OF PROCEDURE: 1/20/22  PROCEDURE:Amputation left third digit distal phalanx with direct closure    Subjective:  Claudia Kinney is approximately 3.5 weeks from DOS. States her pain is minimal at this point, located to amputation site without paresthesias or radiating pain. Does not report drainage to the site. Denies fever, chills, malaise, myalgias. Review of Systems -  all pertinent positives and negatives in HPI. Objective:    General: Alert and oriented X 3, normocephalic atraumatic, external ears and eye normal, sclera clear, no acute distress, respirations easy and unlabored with no audible wheezes, skin warm and dry, speech and dress appropriate for noted age, affect euthymic. Extremity:  Left Upper Extremity  Skin is clean dry and intact  Very little residual swelling at the middle finger noted  Patient able to take fingertip of amputation site to the palm today, nearly concentric fist achieved  Radial pulse palpable, fingers warm with BCR  Flex/extension intact to wrist, thumb and fingers  Finger opposition intact  Finger adduction/abduction intact   Finger crossover intact  Subjectively states sensation intact to radial/medial/ulnar distribution  Incision well approximated with no redness, drainage or warmth, absorbable sutures visualized and intact   Minimal TTP about the amputation site      Temp 98.2 °F (36.8 °C)     Assessment:   Diagnosis Orders   1.  Osteomyelitis of finger of left hand ([address]         Plan:  Remaining sutures trimmed back  Patient to keep working on AROM on her own, making significant progress  Ok to start with warm water/antibacterial soap soaks daily  Keep amputation site covered until all scabbing fully healed when using hands  Follow up in approximately 2 weeks for repeat evaluation and wound check to ensure resolution of amputation site healing. Electronically signed by Hansa Huffman PA-C on 2/15/2022 at 6:24 PM  Note: This report was completed using computerPitchEngine voiced recognition software. Every effort has been made to ensure accuracy; however, inadvertent computerized transcription errors may be present.

## 2022-02-28 ENCOUNTER — OFFICE VISIT (OUTPATIENT)
Dept: ORTHOPEDIC SURGERY | Age: 67
End: 2022-02-28
Payer: COMMERCIAL

## 2022-02-28 VITALS — TEMPERATURE: 98.1 F

## 2022-02-28 DIAGNOSIS — S68.113A AMPUTATION OF LEFT MIDDLE FINGER: ICD-10-CM

## 2022-02-28 DIAGNOSIS — M86.142 ACUTE OSTEOMYELITIS OF PHALANX OF LEFT HAND (HCC): Primary | ICD-10-CM

## 2022-02-28 PROCEDURE — 99024 POSTOP FOLLOW-UP VISIT: CPT | Performed by: PHYSICIAN ASSISTANT

## 2022-02-28 PROCEDURE — 99212 OFFICE O/P EST SF 10 MIN: CPT

## 2022-02-28 NOTE — PROGRESS NOTES
Chief Complaint   Patient presents with    Follow-up     L 3rd distal finger amp, 6wk post-op. Pain controlled, c/o intermittent numbness and tinlging. Denies fever or chills.  Other     XR in EPIC       OP:SURGEON: Dr. Nydia Baron MD  DATE OF PROCEDURE: 1/20/22  PROCEDURE:Amputation left third digit distal phalanx with direct closure    Subjective:  Claudia Kinney is approximately 5 weeks the above date of surgery. She has been weightbearing as tolerated to the left third digit and does not note any warmth, erythema, fluctuance, new symptoms or signs of infection. She is not currently in OT. No new complaints or events since last office visit. No fever or chills. Review of Systems -  all pertinent positives and negatives in HPI. Objective:    General: Alert and oriented X 3, normocephalic atraumatic, external ears and eye normal, sclera clear, no acute distress, respirations easy and unlabored with no audible wheezes, skin warm and dry, speech and dress appropriate for noted age, affect euthymic. Extremity:  Left Upper Extremity  Skin is clean dry and intact  Mild edema noted about 3rd figit  Radial pulse palpable, fingers warm with BCR  Flex/extension intact to wrist, thumb and fingers  Finger opposition intact  Finger adduction/abduction intact  Finger crossover intact  Subjectively states sensation intact to radial/medial/ulnar distribution  Very small crust to the distal tip of the amputation site, no warmth/erythema/fluctuance/tenderness to palpation  Active flexion at MCP and PIP joint, somewhat limited due to stiffness but does display full extension      Temp 98.1 °F (36.7 °C)     Assessment:   Diagnosis Orders   1. Acute osteomyelitis of phalanx of left hand ([facility] [address]     2.  Amputation of left middle finger         Plan:   Reviewed x-rays with patient today in office    WBAT through that finger   Briefly discussed HEP - can call the office in the future if wanting formal OT   No acute interventions from ortho in the future - continue monitoring for s/s of infx which were reviewed today. Okay for any other surgical procedures from cardiovascular in the future. Follow up PRN    Electronically signed by Francois Bansal PA-C on 2/28/2022 at 1:04 PM  Note: This report was completed using [employer] voiced recognition software. Every effort has been made to ensure accuracy; however, inadvertent computerized transcription errors may be present.

## 2022-03-08 ENCOUNTER — HOSPITAL ENCOUNTER (EMERGENCY)
Age: 67
Discharge: ANOTHER HOSPITAL | End: 2022-03-09
Attending: EMERGENCY MEDICINE
Payer: COMMERCIAL

## 2022-03-08 ENCOUNTER — APPOINTMENT (OUTPATIENT)
Dept: CT IMAGING | Age: 67
End: 2022-03-08
Payer: COMMERCIAL
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
## 2022-03-08 DIAGNOSIS — M54.50 ACUTE LOW BACK PAIN, UNSPECIFIED BACK PAIN LATERALITY, UNSPECIFIED WHETHER SCIATICA PRESENT: ICD-10-CM

## 2022-03-08 DIAGNOSIS — E04.2 MULTIPLE THYROID NODULES: ICD-10-CM

## 2022-03-08 PROBLEM — M48.061 LUMBAR STENOSIS WITHOUT NEUROGENIC CLAUDICATION: Status: ACTIVE | Noted: 2022-03-08

## 2022-03-08 LAB
ANION GAP SERPL CALCULATED.3IONS-SCNC: 11 MMOL/L (ref 7–16)
BASOPHILS ABSOLUTE: 0.03 E9/L (ref 0–0.2)
BASOPHILS RELATIVE PERCENT: 0.4 % (ref 0–2)
BUN BLDV-MCNC: 27 MG/DL (ref 6–23)
CALCIUM SERPL-MCNC: 9.4 MG/DL (ref 8.6–10.2)
CHLORIDE BLD-SCNC: 97 MMOL/L (ref 98–107)
CO2: 30 MMOL/L (ref 22–29)
CREAT SERPL-MCNC: 4.7 MG/DL (ref 0.5–1)
EOSINOPHILS ABSOLUTE: 0.09 E9/L (ref 0.05–0.5)
EOSINOPHILS RELATIVE PERCENT: 1.2 % (ref 0–6)
GFR AFRICAN AMERICAN: 11
GFR NON-AFRICAN AMERICAN: 11 ML/MIN/1.73
GLUCOSE BLD-MCNC: 146 MG/DL (ref 74–99)
HCT VFR BLD CALC: 37.5 % (ref 34–48)
HEMOGLOBIN: 11.5 G/DL (ref 11.5–15.5)
IMMATURE GRANULOCYTES #: 0.04 E9/L
IMMATURE GRANULOCYTES %: 0.5 % (ref 0–5)
LYMPHOCYTES ABSOLUTE: 0.8 E9/L (ref 1.5–4)
LYMPHOCYTES RELATIVE PERCENT: 10.6 % (ref 20–42)
MCH RBC QN AUTO: 29.1 PG (ref 26–35)
MCHC RBC AUTO-ENTMCNC: 30.7 % (ref 32–34.5)
MCV RBC AUTO: 94.9 FL (ref 80–99.9)
MONOCYTES ABSOLUTE: 1 E9/L (ref 0.1–0.95)
MONOCYTES RELATIVE PERCENT: 13.3 % (ref 2–12)
NEUTROPHILS ABSOLUTE: 5.58 E9/L (ref 1.8–7.3)
NEUTROPHILS RELATIVE PERCENT: 74 % (ref 43–80)
PDW BLD-RTO: 17.1 FL (ref 11.5–15)
PLATELET # BLD: 181 E9/L (ref 130–450)
PMV BLD AUTO: 11.3 FL (ref 7–12)
POTASSIUM SERPL-SCNC: 4.7 MMOL/L (ref 3.5–5)
RBC # BLD: 3.95 E12/L (ref 3.5–5.5)
SODIUM BLD-SCNC: 138 MMOL/L (ref 132–146)
WBC # BLD: 7.5 E9/L (ref 4.5–11.5)

## 2022-03-08 PROCEDURE — 80048 BASIC METABOLIC PNL TOTAL CA: CPT

## 2022-03-08 PROCEDURE — 70450 CT HEAD/BRAIN W/O DYE: CPT

## 2022-03-08 PROCEDURE — 85025 COMPLETE CBC W/AUTO DIFF WBC: CPT

## 2022-03-08 PROCEDURE — 6360000002 HC RX W HCPCS: Performed by: STUDENT IN AN ORGANIZED HEALTH CARE EDUCATION/TRAINING PROGRAM

## 2022-03-08 PROCEDURE — 36415 COLL VENOUS BLD VENIPUNCTURE: CPT

## 2022-03-08 PROCEDURE — 96375 TX/PRO/DX INJ NEW DRUG ADDON: CPT

## 2022-03-08 PROCEDURE — 72131 CT LUMBAR SPINE W/O DYE: CPT

## 2022-03-08 PROCEDURE — 99285 EMERGENCY DEPT VISIT HI MDM: CPT

## 2022-03-08 PROCEDURE — 96374 THER/PROPH/DIAG INJ IV PUSH: CPT

## 2022-03-08 PROCEDURE — 72125 CT NECK SPINE W/O DYE: CPT

## 2022-03-08 RX ORDER — KETOROLAC TROMETHAMINE 30 MG/ML
15 INJECTION, SOLUTION INTRAMUSCULAR; INTRAVENOUS ONCE
Status: COMPLETED | OUTPATIENT
Start: 2022-03-08 | End: 2022-03-08

## 2022-03-08 RX ORDER — MORPHINE SULFATE 4 MG/ML
4 INJECTION, SOLUTION INTRAMUSCULAR; INTRAVENOUS ONCE
Status: COMPLETED | OUTPATIENT
Start: 2022-03-08 | End: 2022-03-08

## 2022-03-08 RX ADMIN — KETOROLAC TROMETHAMINE 15 MG: 30 INJECTION, SOLUTION INTRAMUSCULAR; INTRAVENOUS at 18:12

## 2022-03-08 RX ADMIN — MORPHINE SULFATE 4 MG: 4 INJECTION, SOLUTION INTRAMUSCULAR; INTRAVENOUS at 20:57

## 2022-03-08 ASSESSMENT — ENCOUNTER SYMPTOMS
ABDOMINAL DISTENTION: 0
VOMITING: 0
SINUS PRESSURE: 0
SHORTNESS OF BREATH: 0
BACK PAIN: 1
WHEEZING: 0
EYE DISCHARGE: 0
EYE PAIN: 0
COUGH: 0
EYE REDNESS: 0
SORE THROAT: 0
NAUSEA: 0
DIARRHEA: 0

## 2022-03-08 ASSESSMENT — PAIN SCALES - GENERAL
PAINLEVEL_OUTOF10: 8
PAINLEVEL_OUTOF10: 8

## 2022-03-08 NOTE — ED PROVIDER NOTES
Patient presents with back pain after a fall. Patient states that she does leg exercises nightly last night she did some like exercises. Patient states that she woke up at around 3 AM and when she took a step out of bed she fell onto the onto the ground. Patient states that since then she has been unable to move secondary to pain. Patient states that she attempted to place a pain pack and use from previous pain medication with no improvement in her symptoms. She denies any bowel or bladder incontinence. She denies any numbness or tingling. Patient rates her pain as severe. She otherwise denies any other symptoms clinically limited to vision changes, headache, neck pain, chest pain, shortness of breath, nausea, or vomiting. She does mention that she has a history of a discectomy and a spinal cord stimulator placed for pain by Dr. Jennifer Marvin. The history is provided by the patient and a relative. No  was used. Review of Systems   Constitutional: Negative for chills and fever. HENT: Negative for ear pain, sinus pressure and sore throat. Eyes: Negative for pain, discharge and redness. Respiratory: Negative for cough, shortness of breath and wheezing. Cardiovascular: Negative for chest pain. Gastrointestinal: Negative for abdominal distention, diarrhea, nausea and vomiting. Genitourinary: Negative for dysuria and frequency. Musculoskeletal: Positive for back pain and gait problem. Negative for arthralgias and neck pain. Skin: Negative for rash and wound. Neurological: Negative for weakness, numbness and headaches. Hematological: Negative for adenopathy. All other systems reviewed and are negative. Physical Exam  Vitals and nursing note reviewed. Constitutional:       General: She is not in acute distress. Appearance: She is well-developed. HENT:      Head: Normocephalic and atraumatic.    Eyes:      Conjunctiva/sclera: Conjunctivae normal. Comments: Eyes are pinpoint and minimally reactive   Cardiovascular:      Rate and Rhythm: Normal rate and regular rhythm. Heart sounds: Normal heart sounds. No murmur heard. Pulmonary:      Effort: Pulmonary effort is normal. No respiratory distress. Breath sounds: Normal breath sounds. No wheezing or rales. Abdominal:      General: Bowel sounds are normal.      Palpations: Abdomen is soft. Tenderness: There is no abdominal tenderness. There is no guarding or rebound. Musculoskeletal:      Cervical back: Normal range of motion and neck supple. No rigidity or tenderness. Skin:     General: Skin is warm and dry. Neurological:      General: No focal deficit present. Mental Status: She is alert and oriented to person, place, and time. Cranial Nerves: No cranial nerve deficit. Sensory: No sensory deficit. Motor: No weakness. Coordination: Coordination normal.          Procedures     MDM  Number of Diagnoses or Management Options  Acute low back pain, unspecified back pain laterality, unspecified whether sciatica present  Fall, initial encounter  Multiple thyroid nodules  Diagnosis management comments: Patient presents with back pain after a fall. It was a mechanical fall. Patient was given toradol for pain without improvement. Morphine and fentanyl were also given and patient continues to be unable to ambulate. CT of the head, cervical spine, and lumbar spine. There was significant stenosis. Patient's spine surgeon was consulted who requested patient be transferred to [facility] for neurosurgery evaluation. Patient was informed of results and plan and was agreeable. Patient transferred.        Amount and/or Complexity of Data Reviewed  Clinical lab tests: reviewed  Tests in the radiology section of CPT®: reviewed  Decide to obtain previous medical records or to obtain history from someone other than the patient: yes         ED Course as of 03/09/22 5817 Tue Mar 08, 2022   2225 Spoke with Dr. Yasmine Myles who states that he would like the patient to be transferred downtown and admitted. [BB]   2259 Spoke with Dr. Chilango Tovar who will accept the patient. [BB]      ED Course User Index  [BB] Blaine Jefferson DO        ED Course as of 03/09/22 1802   Tue Mar 08, 2022   2225 Spoke with Dr. Yasmine Myles who states that he would like the patient to be transferred downtown and admitted. [BB]   2259 Spoke with Dr. Chilango Tovar who will accept the patient. [BB]      ED Course User Index  [BB] Blaine Jefferson DO       --------------------------------------------- PAST HISTORY ---------------------------------------------  Past Medical History:  has a past medical history of Acute infection of bone ([address] Acute osteomyelitis of phalanx of left hand ([address] Anemia of chronic disease, Arthritis, Breast cancer ([address] CAD (coronary artery disease), Carpal tunnel syndrome, Chronic diastolic CHF (congestive heart failure) ([address] CKD (chronic kidney disease) stage 4, GFR 15-29 ml/min ([address] Diabetic retinopathy ([facility] [address] Glaucoma, Hemodialysis patient ([address] Hyperkalemia, diminished renal excretion, Hyperlipidemia, Hypertension, 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 Past Surgical History:  has a past surgical history that includes Cholecystectomy; amputation; Mastectomy; Cystoscopy; Cataract removal; Ankle surgery; other surgical history (9/27/2011); ECHO Compl W Dop Color Flow (2/14/2013); Echo Complete (9/17/2013); spinal cord decompression; other surgical history (insertion lumbar drain insertion); other surgical history (10/22/15); other surgical history (11/03/2015); Tunneled venous catheter placement (11/15/2017);  Dialysis fistula creation (Left, 01/31/2018); vitrectomy (Left, 04/10/2018); pr rpr retinal dtchmnt w/vitrectomy any meth (Left, 4/10/2018); pr av anast,up arm basilic vein transposit (Left, 5/15/2018); pr ligatn angioaccess av fistula (Left, 9/25/2018); Colonoscopy; Carpal tunnel release (Left, 3/17/2020); transesophageal echocardiogram (11/11/2021); Cardiac catheterization (12/09/2021); and Finger amputation (Left, 1/20/2022). Social History:  reports that she has never smoked. She has never used smokeless tobacco. She reports that she does not drink alcohol and does not use drugs. Family History: family history includes Breast Cancer (age of onset: 61) in her maternal grandmother and mother; Heart Disease in her father; Hypertension in her mother; Prostate Cancer in her father. The patients home medications have been reviewed.     Allergies: Furosemide    -------------------------------------------------- RESULTS -------------------------------------------------    Lab  Results for orders placed or performed during the hospital encounter of 03/08/22   CBC with Auto Differential   Result Value Ref Range    WBC 7.5 4.5 - 11.5 E9/L    RBC 3.95 3.50 - 5.50 E12/L    Hemoglobin 11.5 11.5 - 15.5 g/dL    Hematocrit 37.5 34.0 - 48.0 %    MCV 94.9 80.0 - 99.9 fL    MCH 29.1 26.0 - 35.0 pg    MCHC 30.7 (L) 32.0 - 34.5 %    RDW 17.1 (H) 11.5 - 15.0 fL    Platelets 505 999 - 060 E9/L    MPV 11.3 7.0 - 12.0 fL    Neutrophils % 74.0 43.0 - 80.0 %    Immature Granulocytes % 0.5 0.0 - 5.0 %    Lymphocytes % 10.6 (L) 20.0 - 42.0 %    Monocytes % 13.3 (H) 2.0 - 12.0 %    Eosinophils % 1.2 0.0 - 6.0 %    Basophils % 0.4 0.0 - 2.0 %    Neutrophils Absolute 5.58 1.80 - 7.30 E9/L    Immature Granulocytes # 0.04 E9/L    Lymphocytes Absolute 0.80 (L) 1.50 - 4.00 E9/L    Monocytes Absolute 1.00 (H) 0.10 - 0.95 E9/L    Eosinophils Absolute 0.09 0.05 - 0.50 E9/L    Basophils Absolute 0.03 0.00 - 0.20 O4/Z   Basic Metabolic Panel   Result Value Ref Range    Sodium 138 132 - 146 mmol/L    Potassium 4.7 3.5 - 5.0 mmol/L    Chloride 97 (L) 98 - 107 mmol/L    CO2 30 (H) 22 - 29 mmol/L    Anion Gap 11 7 - 16 mmol/L    Glucose 146 (H) 74 - 99 mg/dL    BUN 27 (H) 6 - 23 mg/dL    CREATININE 4.7 (H) 0.5 - 1.0 mg/dL    GFR Non-African American 11 >=60 mL/min/1.73    GFR African American 11     Calcium 9.4 8.6 - 10.2 mg/dL       Radiology  CT Head WO Contrast   Final Result   CT head without contrast:      1. No skull fracture or acute intracranial abnormality. CT cervical spine without contrast:      1. No fracture or joint dislocation is seen. 2. Degenerative changes, as described. 3. Thyroid nodules, with the dominant on the left measuring 3.2 cm. Per the   recommendations of the [employer] of Radiology, follow-up thyroid   sonography is recommended. CT of the lumbar spine:      1. No acute fracture or bony destructive lesion. 2.  Status post decompressive laminectomies with posterior interbody fusion   from L3-L5. 3. Severe loss of disc height with grade 2 anterolisthesis of L2 over L3 and   chronic loss of vertebral body height at L2.   4. Severe central canal stenosis at L2-3. Moderate stenosis at L1-2.   5.  Multilevel neural foraminal stenoses, worst (severe) at L2-3. Moderate   to severe stenoses at L1-2. RECOMMENDATIONS:   Unavailable         CT Cervical Spine WO Contrast   Final Result   CT head without contrast:      1. No skull fracture or acute intracranial abnormality. CT cervical spine without contrast:      1. No fracture or joint dislocation is seen. 2. Degenerative changes, as described. 3. Thyroid nodules, with the dominant on the left measuring 3.2 cm. Per the   recommendations of the [employer] of Radiology, follow-up thyroid   sonography is recommended. CT of the lumbar spine:      1. No acute fracture or bony destructive lesion. 2.  Status post decompressive laminectomies with posterior interbody fusion   from L3-L5.    3. Severe loss of disc height with grade 2 anterolisthesis of L2 over L3 and   chronic loss of vertebral body height at L2.   4. Severe central canal stenosis at L2-3. Moderate stenosis at L1-2.   5.  Multilevel neural foraminal stenoses, worst (severe) at L2-3. Moderate   to severe stenoses at L1-2. RECOMMENDATIONS:   Unavailable         CT Lumbar Spine WO Contrast   Final Result   CT head without contrast:      1. No skull fracture or acute intracranial abnormality. CT cervical spine without contrast:      1. No fracture or joint dislocation is seen. 2. Degenerative changes, as described. 3. Thyroid nodules, with the dominant on the left measuring 3.2 cm. Per the   recommendations of the [employer] of Radiology, follow-up thyroid   sonography is recommended. CT of the lumbar spine:      1. No acute fracture or bony destructive lesion. 2.  Status post decompressive laminectomies with posterior interbody fusion   from L3-L5. 3. Severe loss of disc height with grade 2 anterolisthesis of L2 over L3 and   chronic loss of vertebral body height at L2.   4. Severe central canal stenosis at L2-3. Moderate stenosis at L1-2.   5.  Multilevel neural foraminal stenoses, worst (severe) at L2-3. Moderate   to severe stenoses at L1-2. RECOMMENDATIONS:   Unavailable               ------------------------- NURSING NOTES AND VITALS REVIEWED ---------------------------  Date / Time Roomed:  3/8/2022  4:15 PM  ED Bed Assignment:  21/21    The nursing notes within the ED encounter and vital signs as below have been reviewed.    Patient Vitals for the past 24 hrs:   BP Temp Temp src Pulse Resp SpO2 Height Weight   03/09/22 1615 (!) 119/56 98.7 °F (37.1 °C) -- 75 16 -- -- 184 lb 1.4 oz (83.5 kg)   03/09/22 1600 (!) 125/46 96.8 °F (36 °C) -- 67 -- -- -- --   03/09/22 1530 (!) 134/37 96.8 °F (36 °C) -- 68 -- -- -- --   03/09/22 1500 (!) 116/57 96.8 °F (36 °C) -- 70 -- -- -- --   03/09/22 1430 (!) 128/98 96.8 °F (36 °C) -- 73 -- -- -- --   03/09/22 1400 (!) 134/44 96.8 °F (36 °C) -- 71 -- -- -- --   03/09/22 1330 (!) 130/51 96.8 °F (36 °C) -- 71 -- -- -- --   03/09/22 1315 (!) 108/46 96.8 °F (36 °C) -- 73 -- -- -- --   03/09/22 1310 (!) 125/56 99.1 °F (37.3 °C) -- 73 16 -- -- 186 lb 11.7 oz (84.7 kg)   03/09/22 1238 -- -- -- -- -- -- 5' 10\" (1.778 m) 182 lb (82.6 kg)   03/09/22 1229 93/62 98.1 °F (36.7 °C) Oral 74 16 94 % -- --   03/09/22 0419 (!) 148/86 98.2 °F (36.8 °C) Oral 72 16 98 % -- --       Oxygen Saturation Interpretation: Normal      ------------------------------------------ PROGRESS NOTES ------------------------------------------  Re-evaluation(s):  Time: 2212. Patients symptoms show no change  Repeat physical examination is not changed    I have spoken with the patient and brother and discussed todays results, in addition to providing specific details for the plan of care and counseling regarding the diagnosis and prognosis. Their questions are answered at this time and they are agreeable with the plan. I have discussed the risks and benefits of transfer and they wish to proceed with the transfer. --------------------------------- ADDITIONAL PROVIDER NOTES ---------------------------------  Consultations:  Spoke with Dr. Yasmine Myles (Neurosurgery). Discussed case. They will provide consultation. Spoke with Dr. Chilango Tovar (Medicine). Discussed case. They will admit this patient. Reason for transfer: Neurosurgery Consult. This patient's ED course included: a personal history and physicial examination, multiple bedside re-evaluations and IV medications    This patient has remained hemodynamically stable during their ED course. Please note that the withdrawal or failure to initiate urgent interventions for this patient would likely result in a life threatening deterioration or permanent disability. Clinical Impression  1. Fall, initial encounter    2.  Acute low back pain, unspecified back pain laterality, unspecified whether sciatica present    3. Multiple thyroid nodules          Disposition  Patient's disposition: Transfer to [facility]. Transferred by: BLS. Patient's condition is stable.     Patient was seen and evaluated by both myself and Fady Villar MD.         Rema Red DO  Resident  03/09/22 0870

## 2022-03-09 ENCOUNTER — HOSPITAL ENCOUNTER (INPATIENT)
Age: 67
LOS: 6 days | Discharge: NURSING, REHAB OR LONG-TERM CARE | DRG: 551 | End: 2022-03-17
Attending: INTERNAL MEDICINE | Admitting: INTERNAL MEDICINE
Payer: COMMERCIAL

## 2022-03-09 VITALS
TEMPERATURE: 98.2 F | DIASTOLIC BLOOD PRESSURE: 60 MMHG | SYSTOLIC BLOOD PRESSURE: 102 MMHG | RESPIRATION RATE: 16 BRPM | OXYGEN SATURATION: 97 % | HEIGHT: 70 IN | HEART RATE: 74 BPM | WEIGHT: 184.08 LBS | BODY MASS INDEX: 26.35 KG/M2

## 2022-03-09 DIAGNOSIS — M54.50 ACUTE MIDLINE LOW BACK PAIN WITHOUT SCIATICA: Primary | ICD-10-CM

## 2022-03-09 PROCEDURE — 6370000000 HC RX 637 (ALT 250 FOR IP)

## 2022-03-09 PROCEDURE — 6360000002 HC RX W HCPCS: Performed by: EMERGENCY MEDICINE

## 2022-03-09 PROCEDURE — 90935 HEMODIALYSIS ONE EVALUATION: CPT

## 2022-03-09 PROCEDURE — 6370000000 HC RX 637 (ALT 250 FOR IP): Performed by: EMERGENCY MEDICINE

## 2022-03-09 RX ORDER — ATORVASTATIN CALCIUM 80 MG/1
80 TABLET, FILM COATED ORAL NIGHTLY
COMMUNITY
End: 2022-08-01

## 2022-03-09 RX ORDER — LIDOCAINE 40 MG/G
CREAM TOPICAL PRN
Status: DISCONTINUED | OUTPATIENT
Start: 2022-03-09 | End: 2022-03-09 | Stop reason: HOSPADM

## 2022-03-09 RX ORDER — BUMETANIDE 2 MG/1
2 TABLET ORAL SEE ADMIN INSTRUCTIONS
Status: ON HOLD | COMMUNITY
End: 2022-08-10 | Stop reason: HOSPADM

## 2022-03-09 RX ORDER — INSULIN GLARGINE 100 [IU]/ML
5 INJECTION, SOLUTION SUBCUTANEOUS NIGHTLY
Status: ON HOLD | COMMUNITY
End: 2022-07-21 | Stop reason: HOSPADM

## 2022-03-09 RX ORDER — LIDOCAINE AND PRILOCAINE 25; 25 MG/G; MG/G
1 CREAM TOPICAL SEE ADMIN INSTRUCTIONS
COMMUNITY

## 2022-03-09 RX ORDER — ONDANSETRON 2 MG/ML
4 INJECTION INTRAMUSCULAR; INTRAVENOUS ONCE
Status: DISCONTINUED | OUTPATIENT
Start: 2022-03-09 | End: 2022-03-09 | Stop reason: HOSPADM

## 2022-03-09 RX ORDER — LIDOCAINE AND PRILOCAINE 25; 25 MG/G; MG/G
CREAM TOPICAL
Status: COMPLETED
Start: 2022-03-09 | End: 2022-03-09

## 2022-03-09 RX ORDER — ACETAMINOPHEN 325 MG/1
TABLET ORAL
Status: DISCONTINUED
Start: 2022-03-09 | End: 2022-03-09 | Stop reason: HOSPADM

## 2022-03-09 RX ORDER — FENTANYL CITRATE 50 UG/ML
50 INJECTION, SOLUTION INTRAMUSCULAR; INTRAVENOUS ONCE
Status: COMPLETED | OUTPATIENT
Start: 2022-03-09 | End: 2022-03-09

## 2022-03-09 RX ORDER — ISOSORBIDE MONONITRATE 30 MG/1
30 TABLET, EXTENDED RELEASE ORAL DAILY
Status: ON HOLD | COMMUNITY
End: 2022-07-21 | Stop reason: HOSPADM

## 2022-03-09 RX ORDER — ACETAMINOPHEN 325 MG/1
650 TABLET ORAL ONCE
Status: COMPLETED | OUTPATIENT
Start: 2022-03-09 | End: 2022-03-09

## 2022-03-09 RX ADMIN — LIDOCAINE AND PRILOCAINE: 25; 25 CREAM TOPICAL at 12:37

## 2022-03-09 RX ADMIN — FENTANYL CITRATE 50 MCG: 50 INJECTION, SOLUTION INTRAMUSCULAR; INTRAVENOUS at 03:08

## 2022-03-09 RX ADMIN — ACETAMINOPHEN 650 MG: 325 TABLET ORAL at 22:04

## 2022-03-09 ASSESSMENT — PAIN SCALES - GENERAL
PAINLEVEL_OUTOF10: 1
PAINLEVEL_OUTOF10: 5
PAINLEVEL_OUTOF10: 7
PAINLEVEL_OUTOF10: 4
PAINLEVEL_OUTOF10: 4
PAINLEVEL_OUTOF10: 1
PAINLEVEL_OUTOF10: 8

## 2022-03-09 ASSESSMENT — PAIN DESCRIPTION - LOCATION
LOCATION: BACK;HIP;LEG
LOCATION: BACK
LOCATION: BACK;HIP

## 2022-03-09 ASSESSMENT — PAIN DESCRIPTION - DESCRIPTORS
DESCRIPTORS: SHARP
DESCRIPTORS: SHARP

## 2022-03-09 ASSESSMENT — PAIN DESCRIPTION - PROGRESSION
CLINICAL_PROGRESSION: NOT CHANGED
CLINICAL_PROGRESSION: NOT CHANGED

## 2022-03-09 ASSESSMENT — PAIN DESCRIPTION - PAIN TYPE
TYPE: CHRONIC PAIN
TYPE: ACUTE PAIN
TYPE: CHRONIC PAIN

## 2022-03-09 ASSESSMENT — PAIN DESCRIPTION - ORIENTATION
ORIENTATION: LEFT
ORIENTATION: LEFT

## 2022-03-09 ASSESSMENT — PAIN DESCRIPTION - FREQUENCY
FREQUENCY: CONTINUOUS
FREQUENCY: CONTINUOUS

## 2022-03-09 ASSESSMENT — PAIN DESCRIPTION - ONSET
ONSET: ON-GOING
ONSET: ON-GOING

## 2022-03-09 NOTE — PROGRESS NOTES
Reviewed studies.  Will evaluate her when admitted to [facility]

## 2022-03-09 NOTE — LETTER
[place] Department Medicaid  CERTIFICATION OF NECESSITY  FOR NON-EMERGENCY TRANSPORTATION   BY GROUND AMBULANCE      Individual Information   1. Name: Zak Haq 2. [place] Medicaid Billing Number:    3. Address: [address] [place] [address], [address] [facility]      Transportation Provider Information   4. Provider Name:  [id number]   5. [place] Medicaid Provider Number:   National Provider Identifier (NPI):       Certification  7. Criteria:  During transport, this individual requires:  [x] Medical treatment or continuous     supervision by an EMT. [x] The administration or regulation of oxygen by another person. [] Supervised protective restraint. 8. Period Beginning Date:  03/14/2022   9. Length  [x] Not more than 6 day(s)  [] One Year     Additional Information Relevant to Certification   10. Comments or Explanations, If Necessary or Appropriate     3L O2 per NC, Severe Central Canal Stenosis, Spondylolisthesis grade 2 L2-3, Spinal Neutrality, Spinal precautions, LSO brace, Left AV graft     Certifying Practitioner Information   11. Name of Practitioner:  Dr Nicki Henriquez MD   12. [place] Medicaid Provider Number, If Applicable:  [address] Provider Identifier (NPI):      Signature Information   14. Date of Signature:  03/14/2022 15. Name of Person Signing:  Arden Walker RN.   16. Signature and Professional Designation:  Electronically signed by Arden Walker RN on 3/14/22 at 3:24 PM EDT       ODM 39470  Rev. 7/2015  [facility] Encounter Date/Time: 3/9/2022 2347    Hospital Account: [de-identified]    MRN: [id number]    Patient: Zak Haq    Contact Serial #: [id number]      ENCOUNTER          Patient Class: Observation Private Enc?   No Unit RM BD: SEYZ 8SE ACUITY [facility] 8501/8501-A   Hospital Service: MED   Encounter DX: Lumbar stenosis without *   ADM Provider: Nicki Henriquez MD   Procedure:     ATT Provider: Nicki Henriquez MD   REF Provider: Rambo Fry      Admission DX: Lumbar stenosis without neurogenic claudication, Lumbar foraminal stenosis, Back pain and DX codes: M48.061, M48.061, M54.9      PATIENT                 Name: Amber Reynoso : 1955 (67 yrs)   Address: [address], [address] Sex: Female   City: [place] [address]         Marital Status: Single   Employer: DISABLED         Anabaptism: [id number]   Primary Care Provider: Greta Perez MD         Primary Phone: [phone]   EMERGENCY CONTACT   Contact Name Legal Guardian? Relationship to Patient Home Phone Work Phone   1. Pita Gaines  2. Missy Magalie      Other  Brother/Sister [phone]                 GUARANTOR            Guarantor: Amber Reynoso     : 1955   Address: [address] Sex: Female     [place] [address]     Relation to Patient: Self       Home Phone: [phone]   Guarantor ID: [id number]       Work Phone:     Guarantor Employer: DISABLED         Status: DISABLED      COVERAGE        PRIMARY INSURANCE   Payor: [address] [place] [address] 62*   Payor Address: [facility], [address]       Group Number: [id number] Type: INDEMNITY   Subscriber Name: Billy Barbour : 1955   Subscriber ID: [id number] Pat. Rel. to Sub: Self   SECONDARY INSURANCE   Payor:   Plan:     Payor Address:  ,           Group Number:   Insurance Type:     Subscriber Name:   Subscriber :     Subscriber ID:   Pat.  Rel. to Sub:             CSN: [id number]

## 2022-03-09 NOTE — CONSULTS
Department of Internal Medicine  Section of Nephrology  Dialysis Note        PROCEDURE:  Patient seen on hemodialysis 3/9/2022 at 4:44 PM    PHYSICIAN: Mehrdad Sahu MD    INDICATION:  Full consult deferred as pt followed longitudinally iin th outpt unit. Pt presented to the ED with the cc of back pain and inability to walk. CT Scan of the back showed severe loss of disc height and canal stenosis as well as neural foraminal stenosis. She has been accepted to Neurosurgery Service and is for transfer to [facility]. She dialyzes Q MWF. RX:  See dialysis flowsheet for specifics on access, blood flow rate, dialysate baths, duration of dialysis, anticoagulation and other technical information.     COMMENTS: Constitutional:older female in NAD  VITALS:  BP (!) 119/56   Pulse 75   Temp 98.7 °F (37.1 °C)   Resp 16   Ht 5' 10\" (1.778 m)   Wt 184 lb 1.4 oz (83.5 kg)   SpO2 94%   BMI 26.41 kg/m²   Gen: alert, awake, nad  Skin: no rash, turgor wnl  Heent:  eomi, mmm, bunny, nc,at  Neck: no bruits or jvd noted, no thyromegaly  Cardiovascular: PMI not lat displaced  S1, S2 without S3 or rub  Respiratory: CTA B without w/r/r  Abdomen:  +bs, soft, nt, nd, no HSM  Ext: (-) bilat  lower extremity edema  Psychiatric: mood and affect appropriate, Cr nr 2-12 grossly intact    CBC with Differential:    Lab Results   Component Value Date    WBC 7.5 03/08/2022    RBC 3.95 03/08/2022    HGB 11.5 03/08/2022    HCT 37.5 03/08/2022     03/08/2022    MCV 94.9 03/08/2022    MCH 29.1 03/08/2022    MCHC 30.7 03/08/2022    RDW 17.1 03/08/2022    NRBC 0.0 02/18/2017    SEGSPCT 70 03/12/2014    BANDSPCT 1 02/18/2015    LYMPHOPCT 10.6 03/08/2022    PROMYELOPCT 1.8 02/18/2017    MONOPCT 13.3 03/08/2022    MYELOPCT 0.9 10/24/2017    BASOPCT 0.4 03/08/2022    MONOSABS 1.00 03/08/2022    LYMPHSABS 0.80 03/08/2022    EOSABS 0.09 03/08/2022    BASOSABS 0.03 03/08/2022     CMP:    Lab Results   Component Value Date     03/08/2022 K 4.7 03/08/2022    K 3.8 01/20/2022    CL 97 03/08/2022    CO2 30 03/08/2022    BUN 27 03/08/2022    CREATININE 4.7 03/08/2022    GFRAA 11 03/08/2022    LABGLOM 11 03/08/2022    GLUCOSE 146 03/08/2022    GLUCOSE 46 04/02/2012    PROT 6.7 01/20/2022    LABALBU 4.1 01/20/2022    LABALBU 3.8 04/02/2012    CALCIUM 9.4 03/08/2022    BILITOT 0.4 01/20/2022    ALKPHOS 98 01/20/2022    AST 18 01/20/2022    ALT 19 01/20/2022     Ionized Calcium:  No results found for: IONCA  Magnesium:    Lab Results   Component Value Date    MG 2.0 05/25/2021     Phosphorus:    Lab Results   Component Value Date    PHOS 3.1 05/25/2021         1. ESRD:  Tolerating hemodialysis well with 1.5L vol removal targeted  PLAN:  1. Will follow at [facility] for [address] with IHD  2. Follow labs    2. Anemia in CKD :  HgB at goal 10-12  PLAN:  1. Start JAYSHREE when the HgB <10  2. Follow H/H    3. Renal Osteodystrophy:    Ca++ WNL  PLAN:  1. Will follow PO4    4. HTN with CKD 5/ESKD  BP goal <140/90  PLAN:  1. Monitor with vol removal    5. Back pain  PLAN:  1.  For transfer to [facility] to Neurosurgery    Thank you for allowing our service to garcía singh in [facility] care    Adi Waters

## 2022-03-09 NOTE — ED NOTES
Pt states that she came in for pain to her back that goes down into her legs, she states that she woke up Tuesday and has been having trouble walking, pt states that she normally walks without an issue, pt is alert times 3, lungs are clear, she has 1 plus edema to bilateral arms and 1 plus edema to the rt leg, she has a fistula that is positive for a bruit and thrill to the left arm, pedal pulses are weak not able to palpate,.       Crys Beard, RN  03/09/22 0288

## 2022-03-09 NOTE — LETTER
[place] Medicaid  CERTIFICATION OF NECESSITY  FOR NON-EMERGENCY TRANSPORTATION   BY GROUND AMBULANCE      Individual Information   1. Name: Virginie Montoya 2. [place] Medicaid Billing Number:    3. Address: [address] [place] [address], [address] [facility]      Transportation Provider Information   4. Provider Name:    5. [place] Provider Number:  National Provider Identifier (NPI):      Certification  7. Criteria:  During transport, this individual requires:  [x] Medical treatment or continuous     supervision by an EMT. [x] The administration or regulation of oxygen by another person. [] Supervised protective restraint. 8. Period Beginning Date: 3/15/22   9. Length  [x] Not more than 6 day(s)  [] One Year     Additional Information Relevant to Certification   10. Comments or Explanations, If Necessary or Appropriate     Lumbar Foraminal Stenosis, 02, Max A, Exhibits Decreased Strength, Balance, Coordination Impairing Functional Ability. Certifying Practitioner Information   11. Name of Practitioner: Dr. Chris Martines MD   12. [place] Medicaid Provider Number, If Applicable:  [address] Provider Identifier (NPI):      Signature Information   14. Date of Signature: 3/15/22 15. Name of Person Signing: Electronically signed by Flako Ennis on 3/15/2022 at 1:13 PM     16. Signature and Professional Designation: Electronically signed by Abdias Ace Plansantos on 3/15/2022 at 1:13 PM     OD 82548  Rev. 7/2015    [facility] Encounter Date/Time: 3/9/2022 2347    Hospital Account: [de-identified]    MRN: [id number]    Patient: Virginie Montoya    Contact Serial #: [id number]      ENCOUNTER          Patient Class: Observation Private Enc?   No Unit RM BD: SEYZ 5S NS 5207/5207-A   Hospital Service: MED   Encounter DX: Lumbar stenosis without *   ADM Provider: Chris Martines MD   Procedure:     ATT Provider: Chris Martines MD   REF Provider: Rosbiel Navarrete      Admission DX: Lumbar stenosis without neurogenic claudication, Lumbar foraminal stenosis, Back pain and DX codes: M48.061, M48.061, M54.9      PATIENT                 Name: Amber Reynoso : 1955 (67 yrs)   Address: [address], [address] Sex: Female   City: [place] [address]         Marital Status: Single   Employer: DISABLED         Holiness: FGJZKRL'A Witness   Primary Care Provider: Greta Perez MD         Primary Phone: [phone]   EMERGENCY CONTACT   Contact Name Legal Guardian? Relationship to Patient Home Phone Work Phone   1. Pita Gaines  2. Missy Mejias      Other  Brother/Sister [phone]                 GUARANTOR            Guarantor: Amber Reynoso     : 1955   Address: [address] Sex: Female     [place] [address]     Relation to Patient: Self       Home Phone: [phone]   Guarantor ID: [id number]       Work Phone:     Guarantor Employer: DISABLED         Status: DISABLED      COVERAGE        PRIMARY INSURANCE   Payor: [address] [place] [address] 62*   Payor Address: [facility], [address]       Group Number: [id number] Type: INDEMNITY   Subscriber Name: Billy Barbour : 1955   Subscriber ID: [id number] Pat. Rel. to Sub: Self   SECONDARY INSURANCE   Payor:   Plan:     Payor Address:  ,           Group Number:   Insurance Type:     Subscriber Name:   Subscriber :     Subscriber ID:   Pat.  Rel. to Sub:             CSN: [id number]

## 2022-03-09 NOTE — FLOWSHEET NOTE
03/09/22 1615   Vital Signs   BP (!) 119/56   Temp 98.7 °F (37.1 °C)   Pulse 75   Resp 16   Weight 184 lb 1.4 oz (83.5 kg)   Weight Method Actual   Percent Weight Change -1.42   Pain Assessment   Pain Assessment 0-10   Pain Level 4   Pain Type Chronic pain   Pain Location Back; Hip   Pain Orientation Left   Pain Descriptors Sharp   Pain Frequency Continuous   Pain Onset On-going   Clinical Progression Not changed   Patient's Stated Pain Goal 2   Response to Pain Intervention   (Pain meds given in ER report given to Bishop Shaneka MANNING)   Multiple Pain Sites Yes   Post-Hemodialysis Assessment   Post-Treatment Procedures Blood returned; Access bleeding time < 10 minutes   Machine Disinfection Process Acid/Vinegar Clean;Exterior Machine Disinfection   Rinseback Volume (ml) 300 ml   Total Liters Processed (l/min) 69.4 l/min   Dialyzer Clearance Lightly streaked   Duration of Treatment (minutes) 180 minutes   Hemodialysis Intake (ml) 300 ml   Hemodialysis Output (ml) 1500 ml   NET Removed (ml) 1200 ml   Tolerated Treatment Good   Bilateral Breath Sounds Diminished   Edema Right upper extremity   Edema Generalized +1   RUE Edema +1

## 2022-03-10 PROBLEM — M48.061 LUMBAR FORAMINAL STENOSIS: Status: ACTIVE | Noted: 2022-03-10

## 2022-03-10 LAB
ALBUMIN SERPL-MCNC: 3.7 G/DL (ref 3.5–5.2)
ALP BLD-CCNC: 82 U/L (ref 35–104)
ALT SERPL-CCNC: 17 U/L (ref 0–32)
ANION GAP SERPL CALCULATED.3IONS-SCNC: 13 MMOL/L (ref 7–16)
AST SERPL-CCNC: 21 U/L (ref 0–31)
BASOPHILS ABSOLUTE: 0.02 E9/L (ref 0–0.2)
BASOPHILS RELATIVE PERCENT: 0.3 % (ref 0–2)
BILIRUB SERPL-MCNC: 0.8 MG/DL (ref 0–1.2)
BUN BLDV-MCNC: 26 MG/DL (ref 6–23)
CALCIUM SERPL-MCNC: 9.8 MG/DL (ref 8.6–10.2)
CHLORIDE BLD-SCNC: 99 MMOL/L (ref 98–107)
CO2: 29 MMOL/L (ref 22–29)
CREAT SERPL-MCNC: 4.7 MG/DL (ref 0.5–1)
EKG ATRIAL RATE: 69 BPM
EKG P AXIS: 50 DEGREES
EKG P-R INTERVAL: 182 MS
EKG Q-T INTERVAL: 414 MS
EKG QRS DURATION: 94 MS
EKG QTC CALCULATION (BAZETT): 443 MS
EKG R AXIS: 2 DEGREES
EKG T AXIS: 162 DEGREES
EKG VENTRICULAR RATE: 69 BPM
EOSINOPHILS ABSOLUTE: 0.05 E9/L (ref 0.05–0.5)
EOSINOPHILS RELATIVE PERCENT: 0.7 % (ref 0–6)
GFR AFRICAN AMERICAN: 11
GFR NON-AFRICAN AMERICAN: 11 ML/MIN/1.73
GLUCOSE BLD-MCNC: 100 MG/DL (ref 74–99)
HCT VFR BLD CALC: 37.8 % (ref 34–48)
HEMOGLOBIN: 11.7 G/DL (ref 11.5–15.5)
IMMATURE GRANULOCYTES #: 0.03 E9/L
IMMATURE GRANULOCYTES %: 0.4 % (ref 0–5)
LYMPHOCYTES ABSOLUTE: 1.21 E9/L (ref 1.5–4)
LYMPHOCYTES RELATIVE PERCENT: 17.5 % (ref 20–42)
MCH RBC QN AUTO: 28.7 PG (ref 26–35)
MCHC RBC AUTO-ENTMCNC: 31 % (ref 32–34.5)
MCV RBC AUTO: 92.6 FL (ref 80–99.9)
METER GLUCOSE: 114 MG/DL (ref 74–99)
METER GLUCOSE: 127 MG/DL (ref 74–99)
METER GLUCOSE: 131 MG/DL (ref 74–99)
METER GLUCOSE: 94 MG/DL (ref 74–99)
MONOCYTES ABSOLUTE: 1.06 E9/L (ref 0.1–0.95)
MONOCYTES RELATIVE PERCENT: 15.3 % (ref 2–12)
NEUTROPHILS ABSOLUTE: 4.54 E9/L (ref 1.8–7.3)
NEUTROPHILS RELATIVE PERCENT: 65.8 % (ref 43–80)
PDW BLD-RTO: 16.9 FL (ref 11.5–15)
PLATELET # BLD: 152 E9/L (ref 130–450)
PMV BLD AUTO: 11 FL (ref 7–12)
POTASSIUM REFLEX MAGNESIUM: 4.7 MMOL/L (ref 3.5–5)
RBC # BLD: 4.08 E12/L (ref 3.5–5.5)
SODIUM BLD-SCNC: 141 MMOL/L (ref 132–146)
TOTAL PROTEIN: 6.8 G/DL (ref 6.4–8.3)
WBC # BLD: 6.9 E9/L (ref 4.5–11.5)

## 2022-03-10 PROCEDURE — 96361 HYDRATE IV INFUSION ADD-ON: CPT

## 2022-03-10 PROCEDURE — 6370000000 HC RX 637 (ALT 250 FOR IP): Performed by: INTERNAL MEDICINE

## 2022-03-10 PROCEDURE — 80053 COMPREHEN METABOLIC PANEL: CPT

## 2022-03-10 PROCEDURE — 36415 COLL VENOUS BLD VENIPUNCTURE: CPT

## 2022-03-10 PROCEDURE — 2580000003 HC RX 258: Performed by: INTERNAL MEDICINE

## 2022-03-10 PROCEDURE — 96360 HYDRATION IV INFUSION INIT: CPT

## 2022-03-10 PROCEDURE — 93005 ELECTROCARDIOGRAM TRACING: CPT | Performed by: NURSE PRACTITIONER

## 2022-03-10 PROCEDURE — 85025 COMPLETE CBC W/AUTO DIFF WBC: CPT

## 2022-03-10 PROCEDURE — APPSS45 APP SPLIT SHARED TIME 31-45 MINUTES: Performed by: NURSE PRACTITIONER

## 2022-03-10 PROCEDURE — 82962 GLUCOSE BLOOD TEST: CPT

## 2022-03-10 PROCEDURE — G0378 HOSPITAL OBSERVATION PER HR: HCPCS

## 2022-03-10 PROCEDURE — 99205 OFFICE O/P NEW HI 60 MIN: CPT | Performed by: INTERNAL MEDICINE

## 2022-03-10 RX ORDER — HYDRALAZINE HYDROCHLORIDE 10 MG/1
10 TABLET, FILM COATED ORAL 2 TIMES DAILY
Status: DISCONTINUED | OUTPATIENT
Start: 2022-03-10 | End: 2022-03-17 | Stop reason: HOSPADM

## 2022-03-10 RX ORDER — ISOSORBIDE MONONITRATE 30 MG/1
30 TABLET, EXTENDED RELEASE ORAL DAILY
Status: DISCONTINUED | OUTPATIENT
Start: 2022-03-10 | End: 2022-03-17 | Stop reason: HOSPADM

## 2022-03-10 RX ORDER — ACETAMINOPHEN 325 MG/1
650 TABLET ORAL EVERY 6 HOURS PRN
Status: DISCONTINUED | OUTPATIENT
Start: 2022-03-10 | End: 2022-03-17 | Stop reason: HOSPADM

## 2022-03-10 RX ORDER — ALLOPURINOL 100 MG/1
100 TABLET ORAL DAILY
Status: DISCONTINUED | OUTPATIENT
Start: 2022-03-10 | End: 2022-03-17 | Stop reason: HOSPADM

## 2022-03-10 RX ORDER — LANTHANUM CARBONATE 500 MG/1
1000 TABLET, CHEWABLE ORAL
Status: DISCONTINUED | OUTPATIENT
Start: 2022-03-10 | End: 2022-03-17 | Stop reason: HOSPADM

## 2022-03-10 RX ORDER — POLYETHYLENE GLYCOL 3350 17 G/17G
17 POWDER, FOR SOLUTION ORAL DAILY PRN
Status: DISCONTINUED | OUTPATIENT
Start: 2022-03-10 | End: 2022-03-17 | Stop reason: HOSPADM

## 2022-03-10 RX ORDER — ACETAMINOPHEN 160 MG/5ML
650 SOLUTION ORAL EVERY 4 HOURS PRN
Status: DISCONTINUED | OUTPATIENT
Start: 2022-03-10 | End: 2022-03-10

## 2022-03-10 RX ORDER — MIDODRINE HYDROCHLORIDE 10 MG/1
5 TABLET ORAL PRN
Status: DISCONTINUED | OUTPATIENT
Start: 2022-03-10 | End: 2022-03-17 | Stop reason: HOSPADM

## 2022-03-10 RX ORDER — GABAPENTIN 100 MG/1
100 CAPSULE ORAL 3 TIMES DAILY
Status: DISCONTINUED | OUTPATIENT
Start: 2022-03-10 | End: 2022-03-17 | Stop reason: HOSPADM

## 2022-03-10 RX ORDER — SODIUM CHLORIDE 0.9 % (FLUSH) 0.9 %
5-40 SYRINGE (ML) INJECTION EVERY 12 HOURS SCHEDULED
Status: DISCONTINUED | OUTPATIENT
Start: 2022-03-10 | End: 2022-03-17 | Stop reason: HOSPADM

## 2022-03-10 RX ORDER — TRAMADOL HYDROCHLORIDE 50 MG/1
50 TABLET ORAL EVERY 6 HOURS PRN
Status: DISCONTINUED | OUTPATIENT
Start: 2022-03-10 | End: 2022-03-17 | Stop reason: HOSPADM

## 2022-03-10 RX ORDER — SODIUM CHLORIDE 9 MG/ML
25 INJECTION, SOLUTION INTRAVENOUS PRN
Status: DISCONTINUED | OUTPATIENT
Start: 2022-03-10 | End: 2022-03-17 | Stop reason: HOSPADM

## 2022-03-10 RX ORDER — 0.9 % SODIUM CHLORIDE 0.9 %
500 INTRAVENOUS SOLUTION INTRAVENOUS ONCE
Status: COMPLETED | OUTPATIENT
Start: 2022-03-10 | End: 2022-03-10

## 2022-03-10 RX ORDER — ONDANSETRON 2 MG/ML
4 INJECTION INTRAMUSCULAR; INTRAVENOUS EVERY 6 HOURS PRN
Status: DISCONTINUED | OUTPATIENT
Start: 2022-03-10 | End: 2022-03-17 | Stop reason: HOSPADM

## 2022-03-10 RX ORDER — ACETAMINOPHEN 650 MG/1
650 SUPPOSITORY RECTAL EVERY 6 HOURS PRN
Status: DISCONTINUED | OUTPATIENT
Start: 2022-03-10 | End: 2022-03-17 | Stop reason: HOSPADM

## 2022-03-10 RX ORDER — ONDANSETRON 4 MG/1
4 TABLET, ORALLY DISINTEGRATING ORAL EVERY 8 HOURS PRN
Status: DISCONTINUED | OUTPATIENT
Start: 2022-03-10 | End: 2022-03-17 | Stop reason: HOSPADM

## 2022-03-10 RX ORDER — SODIUM CHLORIDE 0.9 % (FLUSH) 0.9 %
5-40 SYRINGE (ML) INJECTION PRN
Status: DISCONTINUED | OUTPATIENT
Start: 2022-03-10 | End: 2022-03-17 | Stop reason: HOSPADM

## 2022-03-10 RX ORDER — METOPROLOL SUCCINATE 25 MG/1
25 TABLET, EXTENDED RELEASE ORAL DAILY
Status: DISCONTINUED | OUTPATIENT
Start: 2022-03-10 | End: 2022-03-17 | Stop reason: HOSPADM

## 2022-03-10 RX ORDER — HYDROCODONE BITARTRATE AND ACETAMINOPHEN 5; 325 MG/1; MG/1
1 TABLET ORAL EVERY 6 HOURS PRN
Status: DISCONTINUED | OUTPATIENT
Start: 2022-03-10 | End: 2022-03-17 | Stop reason: HOSPADM

## 2022-03-10 RX ORDER — ATORVASTATIN CALCIUM 40 MG/1
80 TABLET, FILM COATED ORAL NIGHTLY
Status: DISCONTINUED | OUTPATIENT
Start: 2022-03-10 | End: 2022-03-17 | Stop reason: HOSPADM

## 2022-03-10 RX ORDER — PANTOPRAZOLE SODIUM 40 MG/1
40 TABLET, DELAYED RELEASE ORAL
Status: DISCONTINUED | OUTPATIENT
Start: 2022-03-10 | End: 2022-03-17 | Stop reason: HOSPADM

## 2022-03-10 RX ADMIN — GABAPENTIN 100 MG: 100 CAPSULE ORAL at 21:53

## 2022-03-10 RX ADMIN — TRAMADOL HYDROCHLORIDE 50 MG: 50 TABLET, COATED ORAL at 21:53

## 2022-03-10 RX ADMIN — LANTHANUM CARBONATE 1000 MG: 500 TABLET, CHEWABLE ORAL at 12:20

## 2022-03-10 RX ADMIN — TRAMADOL HYDROCHLORIDE 50 MG: 50 TABLET, COATED ORAL at 13:48

## 2022-03-10 RX ADMIN — HYDROCODONE BITARTRATE AND ACETAMINOPHEN 1 TABLET: 5; 325 TABLET ORAL at 02:40

## 2022-03-10 RX ADMIN — ACETAMINOPHEN 650 MG: 325 TABLET ORAL at 11:06

## 2022-03-10 RX ADMIN — ATORVASTATIN CALCIUM 80 MG: 40 TABLET, FILM COATED ORAL at 21:54

## 2022-03-10 RX ADMIN — GABAPENTIN 100 MG: 100 CAPSULE ORAL at 13:48

## 2022-03-10 RX ADMIN — GABAPENTIN 100 MG: 100 CAPSULE ORAL at 08:30

## 2022-03-10 RX ADMIN — HYDROCODONE BITARTRATE AND ACETAMINOPHEN 1 TABLET: 5; 325 TABLET ORAL at 21:53

## 2022-03-10 RX ADMIN — Medication 10 ML: at 07:50

## 2022-03-10 RX ADMIN — LANTHANUM CARBONATE 1000 MG: 500 TABLET, CHEWABLE ORAL at 17:29

## 2022-03-10 RX ADMIN — LANTHANUM CARBONATE 1000 MG: 500 TABLET, CHEWABLE ORAL at 08:31

## 2022-03-10 RX ADMIN — SODIUM CHLORIDE 500 ML: 9 INJECTION, SOLUTION INTRAVENOUS at 07:50

## 2022-03-10 RX ADMIN — PANTOPRAZOLE SODIUM 40 MG: 40 TABLET, DELAYED RELEASE ORAL at 08:30

## 2022-03-10 RX ADMIN — HYDROCODONE BITARTRATE AND ACETAMINOPHEN 1 TABLET: 5; 325 TABLET ORAL at 08:30

## 2022-03-10 RX ADMIN — Medication 10 ML: at 21:54

## 2022-03-10 ASSESSMENT — PAIN DESCRIPTION - ONSET
ONSET: ON-GOING

## 2022-03-10 ASSESSMENT — PAIN DESCRIPTION - FREQUENCY
FREQUENCY: INTERMITTENT
FREQUENCY: CONTINUOUS
FREQUENCY: INTERMITTENT
FREQUENCY: CONTINUOUS
FREQUENCY: INTERMITTENT
FREQUENCY: CONTINUOUS

## 2022-03-10 ASSESSMENT — PAIN DESCRIPTION - DESCRIPTORS
DESCRIPTORS: SHARP;STABBING;DISCOMFORT
DESCRIPTORS: ACHING;DISCOMFORT;SORE
DESCRIPTORS: SHARP
DESCRIPTORS: ACHING;DISCOMFORT;CONSTANT
DESCRIPTORS: ACHING
DESCRIPTORS: SHARP

## 2022-03-10 ASSESSMENT — PAIN SCALES - GENERAL
PAINLEVEL_OUTOF10: 10
PAINLEVEL_OUTOF10: 5
PAINLEVEL_OUTOF10: 10
PAINLEVEL_OUTOF10: 8
PAINLEVEL_OUTOF10: 6
PAINLEVEL_OUTOF10: 10
PAINLEVEL_OUTOF10: 0

## 2022-03-10 ASSESSMENT — PAIN DESCRIPTION - DIRECTION: RADIATING_TOWARDS: RADIATING DOWN LEFT LEG

## 2022-03-10 ASSESSMENT — PAIN DESCRIPTION - LOCATION
LOCATION: BACK
LOCATION: BACK;LEG
LOCATION: BACK

## 2022-03-10 ASSESSMENT — PAIN DESCRIPTION - PAIN TYPE
TYPE: ACUTE PAIN
TYPE: ACUTE PAIN;CHRONIC PAIN

## 2022-03-10 ASSESSMENT — PAIN DESCRIPTION - ORIENTATION
ORIENTATION: LOWER

## 2022-03-10 NOTE — PROGRESS NOTES
Message sent to Dr Rodriguez (covering for Dr Chayo Catalan) regarding new consult. Awaiting response.

## 2022-03-10 NOTE — PROGRESS NOTES
This RN was in the patient's chart because it was assigned to transfer to our unit. Spoke with Dr Shani Ashley and patient does not require a telemetry unit due to Cardiology signed off and was only on for surgical clearance.

## 2022-03-10 NOTE — CONSULTS
[address]                  [address] [facility]  [facility]                                  CONSULTATION    PATIENT NAME: Gabriela Scott                    :        1955  MED REC NO:   [id number]                            ROOM:       5203  ACCOUNT NO:   [de-identified]                           ADMIT DATE: 2022  PROVIDER:     Handy Medley MD    CONSULT DATE:  03/10/2022    CHIEF COMPLAINT:  Pain in the back radiating down to the lower  extremities. HISTORY OF PRESENT ILLNESS:  This is a 80-year-old female who is known  to me. She had undergone posterior lumbar interbody fusion L4-5 and  L5-S1 and subsequently had implantation of spinal cord stimulator. The  patient states spinal cord stimulator has not been working for several  years. She states she did not get significant relief from the spinal  cord stimulator. As the pain has persisted, she had been treated  conservatively. She apparently had an episode where her legs gave out  and she fell on the ground. Since then, the pain in the back and in the  left leg has been aggravated. The pain is more pronounced in the back  as compared to the leg. She underwent a CT scan of the head on  2022 which did not reveal any acute intracranial pathology. CT  scan of the cervical spine revealed no fractures or displacement. She  does have degenerative changes. CT scan of the lumbar spine revealed  status post decompressive laminectomy with posterior interbody fusion  from L3 to L5. She has severe loss of disk height with grade 2  anterolisthesis of L2 over L3, and she has moderate stenosis at level of  L1-2. These studies were reviewed by me.     PAST MEDICAL HISTORY:  Acute osteomyelitis of the phalanx of the left  hand, breast cancer, coronary artery disease, carpal tunnel syndrome,  congestive heart failure, CKD, diabetic retinopathy, glaucoma,  hemodialysis patient, hyperkalemia, hyperlipidemia, hypertension,  neuropathy, diabetes mellitus. The patient is [employer]. Ventricular hypertrophy and vitreous hemorrhage. PAST SURGICAL HISTORY:  Amputation of the right great toe and ankle  surgery for Charcot joint of the right ankle, cardiac catheterization,  release of carpal tunnel syndrome, cataract removal, cholecystectomy,  colonoscopy, cystoscopy, dialysis fistula creation, echo complete,  finger amputation of the left third digit, mastectomy, placement of  spinal cord stimulator, lumbar spine fusion, transesophageal  echocardiogram, tunneled venous catheter placement, and vitrectomy. ALLERGIES:  FUROSEMIDE. SOCIAL HISTORY:  Not a smoker. Does not use alcohol or street drugs. PHYSICAL EXAMINATION:  She is a well-developed, well-nourished female in  no acute distress. She is alert, awake and oriented to time, place and  person. Speech is good. Memory is good. Cranial nerves are grossly  intact. No tenderness noted over the cervical spine. Handgrip is equal  bilaterally. She has difficulty moving her right shoulder because of  pain. Biceps and triceps on the right side is good, and no motor or  sensory deficit noted on the left upper extremity. She has partial  footdrop on the left side with strength being 2/5, and she also has  weakness of the quadriceps muscle strength being 2 to 3-/5. She can  lift her leg up on the left side with weakness. 4/5 strength noted in  the right lower extremities in all the muscles. Sensations are present  grossly. IMPRESSION:  Spondylolisthesis grade 2 L2-3 and status post spinal  fusion L3-4 and L4-5 and lumbar stenosis L1-2, and multiple medical  comorbidities. RECOMMENDATIONS:  I had discussed the option of temporary relief of pain  by epidural nerve block or stabilization of L2-3 surgically with  decompression of the nerve roots.   The patient has opted for surgical  decompression and stabilization of L2-3 that will be the extension of  posterior lumbar interbody fusion from L4-5 and L3-4 to L2-3 and  possible decompression of L1-2. Also the patient wants to have the  spinal cord stimulator removed in the same time. Discussed the risks  and benefits to the patient and the family. If medically Cardiology clear the patient, will plan the surgery for  tomorrow.         Roney Connell MD    D: 03/10/2022 10:59:59       T: 03/10/2022 11:05:00     [email or web address]  Job#: [id number]     Doc#: [id number]    CC:

## 2022-03-10 NOTE — PROGRESS NOTES
OCCUPATIONAL THERAPY TREATMENT NOTE     [facility]      OT BEDSIDE TREATMENT NOTE      Date:3/10/2022  Patient Name: Luke Irizarry  MRN: [id number]  : 1955  Room: [address] [place] [address]-A     OT orders received & appreciated, chart reviewed, currently awaiting NS Dr. Lacey Plummer consult. Will follow accordingly. Thank you,  Prisca Yepez.  Rakesh, OTR/L   License #  [id number]

## 2022-03-10 NOTE — CARE COORDINATION
3/10 Care Coordination. Patient scheduled for PLIF of L2-L3 and extension to previous PLIF, L3-L5 and removal of spinal cord stimulator electrodes with Dr. Elder Morrow tomorrow. Cardio was consulted for clearance and have approved. Patient's PCP is Dr. Clara Joseph and she uses [address] [facility] on [address] [place] [address] in [facility]. Patient lives in a 1 story condo alone with 0 NIKKO. Patient has a SC, cane, FWW, and BSC. She has history of [address] through [facility], history of BOBY but unsure of name. Patient has HD at [facility]. Patient's tentative plan is to return home on discharge pending clinical progression and needs are unclear at this time until surgery tomorrow. SW/CM to follow post surgery and PT/OT evals. Family will provide transportation on discharge.     David Huitron, JULION, RN  [facility] Case Management   Cell: [phone]

## 2022-03-10 NOTE — ED NOTES
Family member on the phone expressing concerns for this patient. Requesting pain medications, and a food tray. Concerns that no one has been in to see patient. [facility] RN in room to reassure that she has been in the room through out the day, food tray was given earlier after her dialysis. Patient awaiting transfer to [facility], Physician ambulance called to follow up with time for transport.   Time pushed to Dev Mejia RN  03/09/22 2200

## 2022-03-10 NOTE — PROGRESS NOTES
Hospitalist Progress Note      SYNOPSIS: Patient admitted on 3/9/2022 for low back pain. This is a 52-year-old black female who was transferred from [facility] because of back pain. Symptoms started few days ago when she took a step and fell on the ground. She denies lower extremity weakness. She denies chest pain, no hematuria dysuria hematemesis or hematochezia. She denies subjective fever or chills. Patient has a history of discectomy and a spinal cord stimulator placed for pain by Dr. Lacey Plummer. At the time of examination patient continues to complain of back pain without weakness. SUBJECTIVE:    Patient seen and examined in her room. Continues to complain of back pain. Denies any chest pain, nausea, vomiting, shortness of breath. Records reviewed. Stable overnight. No other overnight issues reported. Temp (24hrs), Av.6 °F (36.4 °C), Min:96.8 °F (36 °C), Max:99.1 °F (37.3 °C)    DIET: Diet NPO  CODE: Full Code    Intake/Output Summary (Last 24 hours) at 3/10/2022 0725  Last data filed at 3/10/2022 0521  Gross per 24 hour   Intake 50 ml   Output --   Net 50 ml       OBJECTIVE:    BP (!) 115/49   Pulse 71   Temp 98.3 °F (36.8 °C) (Temporal)   Resp 16   Ht 5' 10\" (1.778 m)   Wt 184 lb (83.5 kg)   SpO2 100%   BMI 26.40 kg/m²     General appearance: No apparent distress, appears stated age and cooperative. HEENT:  Conjunctivae/corneas clear. Neck: Supple. No jugular venous distention. Respiratory: Clear to auscultation bilaterally, normal respiratory effort  Cardiovascular: Regular rate rhythm, normal S1-S2  Abdomen: Soft, nontender, nondistended  Musculoskeletal: No clubbing, cyanosis, no bilateral lower extremity edema. Brisk capillary refill. Skin:  No rashes  on visible skin  Neurologic: awake, alert and following commands     ASSESSMENT & PLAN:      1.   Back pain with spinal stenosis: Cervical and lumbar spine CT consistent with  Severe central canal stenosis at L2-3.  Moderate stenosis at L1-2.   5.  Multilevel neural foraminal stenoses, worst (severe) at L2-3.  Moderate   to severe stenoses at L1-2.    Pain control as indicated. Neurosurgery consultation. Waiting for neurosurgery evaluation     2. End-stage renal disease on hemodialysis: Monday Wednesday Friday, consult nephrology for renal replacement therapy. Received dialysis at [facility] on 3/9/2022, next dialysis as per nephrology     3. Chronic anemia secondary to end-stage renal disease: Monitor H&H, continue JAYSHREE.     4.  Essential hypertension: Continue outpatient medications with hydralazine and metoprolol.     5. Hyperlipidemia: Continue statin     6.   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  Gout without acute attack: Continue allopurinol           Diet: Diet NPO  Code Status: Full Code        DISPOSITION:   Unclear discharge disposition at the moment    Medications:  REVIEWED DAILY    Infusion Medications    sodium chloride       Scheduled Medications    sodium chloride flush  5-40 mL IntraVENous 2 times per day    insulin lispro  0-6 Units SubCUTAneous TID WC    insulin lispro  0-3 Units SubCUTAneous Nightly    allopurinol  100 mg Oral Daily    atorvastatin  80 mg Oral Nightly    gabapentin  100 mg Oral TID    hydrALAZINE  10 mg Oral BID    isosorbide mononitrate  30 mg Oral Daily    lanthanum  1,000 mg Oral TID WC    metoprolol succinate  25 mg Oral Daily    pantoprazole  40 mg Oral QAM AC     PRN Meds: sodium chloride flush, sodium chloride, ondansetron **OR** ondansetron, polyethylene glycol, acetaminophen **OR** acetaminophen, HYDROcodone 5 mg - acetaminophen, midodrine    Labs:     Recent Labs     03/08/22  1815 03/10/22  0539   WBC 7.5 6.9   HGB 11.5 11.7   HCT 37.5 37.8    152       Recent Labs     03/08/22  1815 03/10/22  0539    141   K 4.7 4.7   CL 97* 99   CO2 30* 29   BUN 27* 26*   CREATININE 4.7* 4.7*   CALCIUM 9.4 9.8       Recent Labs     03/10/22  0539   PROT 6.8   ALKPHOS 82   ALT 17   AST 21   BILITOT 0.8       No results for input(s): INR in the last 72 hours. No results for input(s): Freidabrittnybessie Chichoalrieza in the last 72 hours. Chronic labs:    Lab Results   Component Value Date    CHOL 101 05/19/2021    TRIG 106 05/19/2021    HDL 38 05/19/2021    LDLCALC 42 05/19/2021    TSH 2.810 05/19/2021    INR 1.0 01/20/2022    LABA1C 5.7 09/07/2021       Radiology: REVIEWED DAILY    +++++++++++++++++++++++++++++++++++++++++++++++++  Thomas Camara MD  [facility] Physician - [address] [place] [address]  +++++++++++++++++++++++++++++++++++++++++++++++++  NOTE: This report was transcribed using voice recognition software. Every effort was made to ensure accuracy; however, inadvertent computerized transcription errors may be present.

## 2022-03-10 NOTE — CONSULTS
Inpatient Cardiology Consultation      Reason for Consult: Cardiac risk stratification    Consulting Physician: Dr. Kitty Ortega    Requesting Physician:  Dr. Aimee Aguirre    Date of Consultation: 3/10/2022    HISTORY OF PRESENT ILLNESS:     This 62-year-old female is known to [facility] cardiology and is followed by Dr. Yessica Rob. She has a history of coronary artery disease with cardiac clearance and cardiomyopathy. 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 She underwent KIARA/ left heart cath. See results below . She was scheduled to have a CABG x2, removal of cardiac mass, fibroelastoma. She then developed a fender on her left finger and had osteomyelitis. She underwent a partial amputation of the middle finger of her left hand and cardiac surgery was postponed until her finger healed. She is actually scheduled to see Dr. Kiah Jo next week. \"    She presented to the emergency room with back pain following a \"\"fall\" . She states she really just slid to the floor. She had taken a step out of bed and slid to the ground in the middle of the night. She states her legs were weak. But was having back pain and called for help. She was transferred to [facility] for neurosurgery consult. CT of the back showed severe loss of disc height and canal stenosis as well as neuroforaminal stenosis. Blood pressure was 108/60 heart rate 73 and she was afebrile with no hypoxia on room air. Potassium 4.7 with a BUN of 27 creatinine 4.7, WBC 7.5 and H&H 11.5 and 37 point    Chest x-ray was not done. CT of the head showed no acute fracture with severe central canal stenosis altered multilevel neuroforaminal stenosis severe at L2-3 and moderate to severe at L1-2    EKG is normal sinus rhythm with no acute ST-T wave changes with some lateral ischemia.     She was evaluated by Dr. Aimee Aguirre and tomorrow she is to have a removal of her spinal stimulator and decompression and stabilization at L2-L3 and extension of posterior lumbar interbody fusion from L4-5 and L5 3-4 and L2-3 and possible decompression L1-2 and removal of spinal cord stimulator. She was given the option of temporary pain relief but opted for surgery. She had hemodialysis on March 9 she makes very little urine. She denies any chest pain or dyspnea with exertion. Past Medical History:    1. Jehovah Witness  2. 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 3. HTN  4. HLD  5. BMI 27.1 on 5/19/2021  6. Anemia  7. Bladder carcinoma s/p chemotherapy  8. Right breast cancer with mastectomy  2005 followed by radiation and chemotherapy, Arimidex. Ramakrishna Davenport.  She states became a therapy because of multiple back issues and weakened her bones,  port in her right upper chest  9. [facility] admission with SOB and chest discomfort 08/2011.  Cardiac enzymes negative.  CT angiogram of the chest negative for PE and dissection.  EKG:  NSR, nonspecific ST T abnormalities.    10. TTE 08/2011 with severe concentric LVH, normal wall motion.  LAE, RVE, Stage I diastolic dysfunction. 11. MPS 08/2011 with small area of lateral ischemia, normal wall motion and EF.    12. Cardiac catheterization 08/30/2012:  CX 90% mid stenosis, OM2 totally occluded.  LAD 20% stenosis, RVA 50% stenosis, LV normal.  She was treated medically.    13. [facility] admission 02/13/2013 with SOB, orthopnea, nonproductive nocturnal cough and edema.  CXR:  cardiomegaly and bilateral effusion.  EKG:  NSR, LAE, low voltage, PVC, poor R wave progression.  BUN 23, Cr 1.5, BNP 1868.  Cardiac enzymes negative.  Hemoglobin 9.9, WBC 5.4.  Rx with aggressive diuretic therapy.    14. Echo 02/14/2013: [address], normal LV systolic function, Stage II diastolic dysfunction, LAE, normal RVSP, no valvulopathy. 15. Lifetime non-smoker. 16. NKDA.  She had worsening renal function in the past when taking lasix. 17. Echo, 07/17/2013.  Mild concentric LVH with normal systolic function. RVSP 53 mmHg. Otherwise normal study. 18. [facility] admission with worsening back pain and inability to walk on 09/22/2014 associated with mild SOB. CXR: Pulmonary vascular congestion. EKG: NSR, possible old inferior MI. Electrolytes normal, BUN=30, Cr=2.0, AOG=32382, Hb=11.1, WBC=5.9, cardiac enzymes negative. 19. Echo, 09/23/2014. Moderate CLVH, normal wall motion, Stage II diastolic dysfunction, RVE, OFELIA, RVSP=50 mmHg. 20. Surgery, Dr. Elder Morrow, 09/25/2014. Decompressive lumbar laminectomy L2-L5 with fusion L3-4 and L4-5. 21. Insertion intrathecal drain, 10/04/2014 for CSF leak, removed several days later, but reinserted 10/12/2014 for recurrent leak. 22. Implant spinal cord stimulator, Penta lead from [address] [place] [address] by thoracic laminotomy, T10.   Implant IPG Protege in the right buttock  23.  TTE 11/2017, EF 55%.  Mild LVH.  Stage II diastolic dysfunction.  Moderate to severe left atrial dilatation.  Mild MR.  RVSP 76.  24. 2017 ESRD dialysis initiated  25. Abdominal CT 2017: Extensive anasarca.  Extensive arteriosclerotic disease. 26. Surgical history: Correction of Charcot joint right, carpal tunnel release 3/2020, cholecystectomy, dialysis fistula creation, mastectomy, laser treatment of eyes, spinal cord stimulator, surgery for retinal detachment.  Spinal cord decompression L2.  27. 11/2017 Insertion of right internal jugular vein tunneled hemodialysis catheter fluoroscopy Removal of right femoral temporary hemodialysis catheter  28. 1/31/2018 Creation of L brachiocephalic AVF  29. 4/16/0927 transposition of left upper extremity brachiobasilic  fistula, stage II  30. 9/25/2018 Superficialization of L basilic AVF  31. 58/59/4212  TTE: EF 60-65%. Moderate CLVH. No VHD. 32. Ambulates with walker  33.  Completed COVID Vaccine  29. 2D echocardiogram [facility] December 8, 2020 EF 60 to 54% and diastolic dysfunction with moderate concentric left ventricular hypertrophy and moderate MAC and aortic valve sclerosis  35. December 30, 2020  36. Hospital admission May 2021 with a reported shockable rhythm by AED and received 1 defibrillation and had reported ventricular tachycardia in the emergency room and was started on a lidocaine drip, elevated troponin but no acute ischemic EKG changes, started on aspirin and Lipitor resumed and beta-blockers and nitrates initiated  37. Lexiscan stress test May 19, 2021, abnormal with a large fixed defect in the apical and septal wall small fixed defect in the inferior apical wall partially reversible defect in the small area of the anterior lateral wall and EF 25% with apical septal akinesis and moderate global hypokinesis and dilated left ventricle during stress and rest and was a high risk study  38. Left heart cath May 2021 she underwent stenting to the LAD and RCA    CONCLUSION:  1. Coronary artery disease:  a. Left main:  20% eccentric mid vessel narrowing.  b.  LAD:  50% proximal vessel narrowing and 95% mid vessel stenosis. Trivial diagonal branch with 90% stenosis. c.  LCX:  Occluded after a small caliber first marginal branch which is  a chronic total occlusion. The second larger marginal branch filled  late. d.  RCA:  Large, dominant vessel with 90% heavily calcified mid vessel  stenosis. 50% disease at the level of the crux and 70% ostial stenosis  of the posterior descending artery branch. 2.  Markedly elevated left ventricular end-diastolic pressure. 3.  Successful balloon angioplasty with deployment of drug-eluting  coronary stent to the mid LAD with very good results. 4.  Successful balloon angioplasty with deployment of drug-eluting  coronary stent to the mid RCA with poststent deployment dilatation with  a larger high-pressure noncompliant balloon with good results.      39.  Lasix allergy with questionable renal failure  40. 2D echocardiogram on May 21, 2021    left ventricle is dilated   There is apical, septal and basal inferior wall severe hypokinesis to   akinesis   Ejection fraction is visually estimated at 30+/-5%. There is doppler evidence of stage III diastolic dysfunction. Normal right ventricular size. Right ventricle global systolic function is mildly reduced . The left atrium is moderately dilated. Severe posterior mitral annular calcification. Focal calcification mitral valve leaflets. Chordal calcification is present. A mobile well defined 1.0 cm by 0.5 cm echo density is noted on the   ventricular aspect of the mitral valve suggestive of a fibroelastoma:   clinical correlation is needed. No mitral stenosis. Mild mitral regurgitation. Mild to moderate tricuspid regurgitation. Moderate pulmonary hypertension. Aortic root is sclerotic and calcified. 39. 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 6 cm x .8cm  42. KIARA November 11, 2021 for mitral leaflet mass   Normal LV function, normal RV function, no hemodynamically significant valve disease(mobile posterior leaflet echodensity with leaflet restriction and mild MR), no evidence of vegetations, no left atrial or left atrial appendage thrombus (no evidence of spontaneous echo contrast), no intraatrial shunting on bubble study, no significant atherosclerosis of the aorta. 43. Left heart cath December 9, 2021  ANGIOGRAPHIC FINDINGS:  1. The left main coronary artery arises normally from the left sinus of  Valsalva. It is a large vessel without any disease. There is mild  distal calcification. It bifurcates into left anterior descending  artery and left circumflex artery. 2.  The left anterior descending artery has moderate-to-severe proximal  and ostial calcifications. There is 20% ostial disease. The rest of  the vessel is mildly diffusely diseased. There is patent stent in the  mid segment. The LAD gives off a few small diagonal branches. The  second one is totally occluded.   The rest are mildly diseased. 3.  The left circumflex artery is a large vessel that has total  occlusion after the takeoff of the second OM branch. The first OM  branch is a mid size vessel that is mildly diseased. The second one is  the small that has chronic total occlusion with a faint left-to-left  filling collaterals. 4.  The right coronary artery has inferior takeoff. There are stents in  the mid and proximal segments. There is 80% in-stent stenosis in the  mid segment. The rest of the vessel has luminal irregularities. It  gives off good size right PDA and good size right PLV that has no  significant CAD noted. There is significant ostial RCA also noted as  evident by damping of pressure on engagement and lack of contrast  reflux.     IMPRESSION:  1. Mild disease involving left anterior descending artery with a patent  stent in the mid segment. 2.  Totally occluded left circumflex artery. 3.  Total occlusion of the second OM branch with left-to-left  collaterals. 4.  Significant ostial RCA and significant mid RCA disease as mentioned  above.     RECOMMENDATIONS:  Continue current treatment as per CT Surgery. Per CTS, she was taking scheduled for a CABG x2 With a saphenous vein graft to the RCA and a saphenous vein graft to the circumflex and excision of mitral valve mass    44. January 3, 2022 osteomyelitis left middle finger left hand, January 20, 2022 amputation left third digit distal phalanx for osteomyelitis (open heart postponed) patient stopped taking aspirin at that time but was only of Brilinta a day or 2  45. Chronic hemodialysis on Monday Wednesday and Friday/renal osteodystrophy  46. Anemia     Allergies:  Lasix [furosemide] \"worsening kidney function\"     Social History:    Denies tobacco and illicit drug use  Denies alcohol consumption  Caffeine intake includes an occasional cup of coffee and an occasional Diet Coke  Currently lives in one floor condo alone. Ambulates with walker.  Calls [employer] for transportation to dialysis. Code Status: Full Code     family History: Father had CABG in his [de-identified] and has since passed away    Medications Prior to admit:  Prior to Admission medications    Medication Sig Start Date End Date Taking? Authorizing Provider   atorvastatin (LIPITOR) 80 MG tablet Take 80 mg by mouth nightly   Yes Historical Provider, MD   bumetanide (BUMEX) 2 MG tablet Take 2 mg by mouth three times a week *[id number]*   Yes Historical Provider, MD   isosorbide mononitrate (IMDUR) 30 MG extended release tablet Take 30 mg by mouth daily   Yes Historical Provider, MD   insulin glargine (LANTUS) 100 UNIT/ML injection vial Inject 11 Units into the skin nightly   Yes Historical Provider, MD   hydrALAZINE (APRESOLINE) 10 MG tablet Take 1 tablet by mouth 2 times daily 2/3/22  Yes Jesse Pulido MD   metoprolol succinate (TOPROL XL) 25 MG extended release tablet Take 1 tablet by mouth daily 11/24/21  Yes Jesse Pulido MD   gabapentin (NEURONTIN) 100 MG capsule Take 1 capsule by mouth 3 times daily for 180 days.  Intended supply: 30 days 11/23/21 5/22/22 Yes Todd Chairez MD   lanthanum (FOSRENOL) 1000 MG chewable tablet Take 1,000 mg by mouth 3 times daily (with meals)  8/9/21  Yes Historical Provider, MD   allopurinol (ZYLOPRIM) 100 MG tablet Take 1 tablet by mouth daily 9/7/21  Yes Jesse Pulido MD   ticagrelor (BRILINTA) 90 MG TABS tablet Take 1 tablet by mouth 2 times daily 9/7/21  Yes Jesse Pulido MD   aspirin 81 MG EC tablet Take 1 tablet by mouth daily 5/26/21  Yes Georgina Coe MD   B Complex-C-Folic Acid (BONI CAPS) 1 MG CAPS Take 1 mg by mouth nightly    Yes Historical Provider, MD MCHUGH 0.01 % SOLN ophthalmic drops Place 1 drop into the right eye nightly  6/9/20  Yes Historical Provider, MD   COMBIGAN 0.2-0.5 % ophthalmic solution Place 1 drop into the right eye every 12 hours  8/1/19  Yes Historical Provider, MD   lidocaine-prilocaine (EMLA) 2.5-2.5 % cream Apply topically See Admin Instructions APPLY SMALL AMOUNT TO ACCESS SITE (AVF) 30-45 MINUTES BEFORE DIALYSIS.   COVER WITH OCCLUSIVE DRESSING ( SARAN WRAP)    Historical Provider, MD   midodrine (PROAMATINE) 5 MG tablet Take 1 tablet by mouth as needed (may repeat x1 durring HD for assymptomatic SBP<100) 5/25/21   Alf Courser, MD   omeprazole (PRILOSEC) 20 MG delayed release capsule Take 20 mg by mouth daily as needed (GI DISCOMFORT)     Historical Provider, MD       Current Medications:    Current Facility-Administered Medications: sodium chloride flush 0.9 % injection 5-40 mL, 5-40 mL, IntraVENous, 2 times per day  sodium chloride flush 0.9 % injection 5-40 mL, 5-40 mL, IntraVENous, PRN  0.9 % sodium chloride infusion, 25 mL, IntraVENous, PRN  ondansetron (ZOFRAN-ODT) disintegrating tablet 4 mg, 4 mg, Oral, Q8H PRN **OR** ondansetron (ZOFRAN) injection 4 mg, 4 mg, IntraVENous, Q6H PRN  polyethylene glycol (GLYCOLAX) packet 17 g, 17 g, Oral, Daily PRN  acetaminophen (TYLENOL) tablet 650 mg, 650 mg, Oral, Q6H PRN **OR** acetaminophen (TYLENOL) suppository 650 mg, 650 mg, Rectal, Q6H PRN  HYDROcodone-acetaminophen (NORCO) 5-325 MG per tablet 1 tablet, 1 tablet, Oral, Q6H PRN  insulin lispro (HUMALOG) injection vial 0-6 Units, 0-6 Units, SubCUTAneous, TID WC  insulin lispro (HUMALOG) injection vial 0-3 Units, 0-3 Units, SubCUTAneous, Nightly  allopurinol (ZYLOPRIM) tablet 100 mg, 100 mg, Oral, Daily  atorvastatin (LIPITOR) tablet 80 mg, 80 mg, Oral, Nightly  gabapentin (NEURONTIN) capsule 100 mg, 100 mg, Oral, TID  hydrALAZINE (APRESOLINE) tablet 10 mg, 10 mg, Oral, BID  isosorbide mononitrate (IMDUR) extended release tablet 30 mg, 30 mg, Oral, Daily  lanthanum (FOSRENOL) chewable tablet 1,000 mg, 1,000 mg, Oral, TID WC  metoprolol succinate (TOPROL XL) extended release tablet 25 mg, 25 mg, Oral, Daily  midodrine (PROAMATINE) tablet 5 mg, 5 mg, Oral, PRN  pantoprazole (PROTONIX) tablet 40 mg, 40 mg, Oral, QAM AC        REVIEW OF SYSTEMS:     · Constitutional: Denies fatigue, fevers, chills or night sweats  · Eyes: Denies visual changes or drainage  · ENT: Denies headaches or hearing loss. No mouth sores or sore throat. No epistaxis   · Cardiovascular: Denies chest pain, pressure or palpitations. No lower extremity swelling. · Respiratory: Denies FRENCH, cough, orthopnea or PND. No hemoptysis   · Gastrointestinal: Denies hematemesis or anorexia. No hematochezia or melena    · Genitourinary: Denies urgency, dysuria or hematuria. Urinates very little and Casodex diuretic on her off dialysis days. · Musculoskeletal: Denies gait disturbance, weakness or joint complaints  · Integumentary: Denies rash, hives or pruritis   · Neurological: Denies dizziness, headaches or seizures. No numbness or tingling  · Psychiatric: Denies anxiety or depression. · Endocrine: Denies temperature intolerance. No recent weight change. .  · Hematologic/Lymphatic: Denies abnormal bruising or bleeding. No swollen lymph nodes    PHYSICAL EXAM:   BP (!) 86/42   Pulse 69   Temp 98.1 °F (36.7 °C) (Temporal)   Resp 16   Ht 5' 10\" (1.778 m)   Wt 184 lb (83.5 kg)   SpO2 94%   BMI 26.40 kg/m²   CONST:  Well developed, well nourished who appears of stated age. Awake, alert and cooperative. No apparent distress. HEENT:   Head- Normocephalic, atraumatic   Eyes- Conjunctivae pink, anicteric  Throat- Oral mucosa pink and moist  Neck-  No stridor, trachea midline, no jugular venous distention. No carotid bruit. CHEST: Chest symmetrical and non-tender to palpation. No accessory muscle use or intercostal retractions, Mediport right upper chest  RESPIRATORY: Lung sounds - clear throughout fields   CARDIOVASCULAR:     Heart Inspection- shows no noted pulsations  Heart Palpation- no heaves or thrills; PMI is non-displaced   Heart Ausculation- Regular rate and rhythm, no murmur. No s3, s4 or rub   PV: No lower extremity edema. No varicosities.  Pedal pulses palpable, no clubbing or cyanosis . Left upper extremity AV fistula with bruit and thrill, partial amputation middle finger left hand with healing  ABDOMEN: Soft, non-tender to light palpation. Bowel sounds present. No palpable masses no organomegaly; no abdominal bruit  MS: Good muscle strength and tone. No atrophy or abnormal movements. : Deferred  SKIN: Warm and dry no statis dermatitis or ulcers   NEURO / PSYCH: Oriented to person, place and time. Speech clear and appropriate. Follows all commands. Pleasant affect     DATA:    ECG / Tele strips: Not monitored  Diagnostic:      Intake/Output Summary (Last 24 hours) at 3/10/2022 1040  Last data filed at 3/10/2022 0521  Gross per 24 hour   Intake 50 ml   Output --   Net 50 ml       Labs:   CBC:   Recent Labs     03/08/22  1815 03/10/22  0539   WBC 7.5 6.9   HGB 11.5 11.7   HCT 37.5 37.8    152     BMP:   Recent Labs     03/08/22  1815 03/10/22  0539    141   K 4.7 4.7   CO2 30* 29   BUN 27* 26*   CREATININE 4.7* 4.7*   LABGLOM 11 11   CALCIUM 9.4 9.8     Mag: No results for input(s): MG in the last 72 hours. Phos: No results for input(s): PHOS in the last 72 hours. TFT:   Lab Results   Component Value Date    TSH 2.810 05/19/2021    C7YTRAF 5.0 12/24/2012    T4FREE 1.58 05/19/2021      HgA1c:   Lab Results   Component Value Date    LABA1C 5.7 09/07/2021     No results found for: EAG  proBNP: No results for input(s): PROBNP in the last 72 hours. PT/INR: No results for input(s): PROTIME, INR in the last 72 hours. APTT:No results for input(s): APTT in the last 72 hours. CARDIAC ENZYMES:No results for input(s): CKTOTAL, CKMB, CKMBINDEX, TROPHS in the last 72 hours.   FASTING LIPID PANEL:  Lab Results   Component Value Date    CHOL 101 05/19/2021    HDL 38 05/19/2021    LDLCALC 42 05/19/2021    TRIG 106 05/19/2021     LIVER PROFILE:  Recent Labs     03/10/22  0539   AST 21   ALT 17   LABALBU 3.7       Electronically signed by GOYO Vance - CNP on 3/10/2022 at 10:40 AM      Impressions and plans by     I personally and independently saw and examined patient and reviewed all done pertinent laboratory data, imaging studies, ECGs and rhythm strips. I have reviewed and agree with the APN history and physical exam as documented in the above note. Electronically signed by Candis Corrales MD on 3/10/2022 at 1:35 PM     ASSESSMENT:  1. Pre op cardiac risk stratification for redo back surgery--> patient is hi risk for surgery  2. 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 Insulin requiring DM with neuropathy, retinopathy and nephropathy  5. ESRD on hemodialysis  6. Borderline low BP. H/o HTN  7. HLD  8. Jehovah Witness  9. Bladder carcinoma s/p chemotherapy  10. DDD lumbar spine with h/o lumbar laminectomy and spinal cord stimulator with intractable back pain  11. S/p amputation of left hand distal middle finger for osteomyelitis  12. Was supposed to have CABG x 2 with resection of the MV fibroelastoma nad possible mitral valve repair      PLAN:  1. Proceed with surgery,  if patient is willing,  with the understanding of being a hi surgical risk for emma operative MI  2. Cannot be percutaneously revascularized prior to back surgery since she will need to be on DAPT if that is done and which will need to be stopped prior to spinal surgery  3. Continue current cardiac medications in the emma operative period  4. Resume aspirin ( low dose ) and Brilinta ASAP after surgery per neuro surgeries discretion  5. As is always recommended, hypoxia, significant anemia, hypotension and aggressive fluid shifts should be avoided in the emma operative period  6. Cardiology will see prn.  Please call if needed    Electronically signed by Candis Corrales MD on 3/10/2022 at 1:59 PM

## 2022-03-10 NOTE — ED NOTES
Report [facility]  03/09/22 2985

## 2022-03-10 NOTE — H&P
Hospitalist History & Physical      PCP: Hugh Haro MD    Date of Admission: 3/9/2022    Date of Service: Pt seen/examined on 3/9/2022 and is admitted to Inpatient with expected LOS greater than two midnights due to medical therapy. Placed in Observation. Chief Complaint:  had no chief complaint listed for this encounter. History Of Present Illness:    Ms. Callie Sue, a 78y.o. 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 This is a 56-year-old black female who was transferred from [facility] because of back pain. Symptoms started few days ago when she took a step and fell on the ground. She denies lower extremity weakness. She denies chest pain, no hematuria dysuria hematemesis or hematochezia. She denies subjective fever or chills. Patient has a history of discectomy and a spinal cord stimulator placed for pain by Dr. Karma Hansen. At the time of examination patient continues to complain of back pain without weakness. Past Medical History:        Diagnosis Date    Acute infection of bone ([address]     infection of rt foot, resolved.     Acute osteomyelitis of phalanx of left hand ([address] 1/27/2022    Left third distal phalanx    Anemia of chronic disease     Arthritis     Breast cancer ([address]     right breast, 2008/ bladder, 2006- last chemotherapy \"years ago\"    CAD (coronary artery disease)     Carpal tunnel syndrome     bilat - for OR left hand 3-17-20     Chronic diastolic CHF (congestive heart failure) ([address] 09/23/2014 9/23/14- echocardiogram revealed moderate LV concentric hypertrophy, stage III diastolic dysfunction, mild tricuspid regurgitation    CKD (chronic kidney disease) stage 4, GFR 15-29 ml/min ([facility])     Diabetic retinopathy ([address]     Glaucoma     Hemodialysis patient ([address]     [facility] wed fri- Dr. Cammie Rocha - left arm fistula     Hyperkalemia, diminished renal excretion 11/9/2017    Hyperlipidemia     Hypertension     Hypoglycemia unawareness in type 1 diabetes mellitus ([address] 11/7/2017    Insulin dependent type 2 diabetes mellitus ([address]     Neuropathy     feet    Osteomyelitis due to secondary diabetes ([address]     rt great toe with amputation    Patient is [employer] 11/7/2017    Refusal of blood product     patient states she dose not take blood transfusion    Ventricular hypertrophy     Vitreous hemorrhage ([address]     left eye       Past Surgical History:        Procedure Laterality Date    AMPUTATION      right great toe    ANKLE SURGERY      correction on charcot joint of right ankle    CARDIAC CATHETERIZATION  12/09/2021    Dr Leatha Magdaleno Left 3/17/2020    LEFT CARPAL TUNNEL RELEASE performed by Sadie Manley MD at [address]      bilateral    CHOLECYSTECTOMY      COLONOSCOPY      CYSTOSCOPY      DIALYSIS FISTULA CREATION Left 01/31/2018    upper arm/Dr. Wang Devoid    ECHO COMPL W DOP COLOR FLOW  2/14/2013         ECHO COMPLETE  9/17/2013         FINGER AMPUTATION Left 1/20/2022    AMPUTATION OF LEFT THIRD DIGIT, DISTAL PHALANX performed by Jonnie Dhillon MD at [address]      right    OTHER SURGICAL HISTORY  9/27/2011    PPV, membranectomy, laser Right eye    OTHER SURGICAL HISTORY  insertion lumbar drain insertion    10/12/`14    OTHER SURGICAL HISTORY  10/22/15    percutaneous lead placement for spinal cord stimulator    OTHER SURGICAL HISTORY  11/03/2015    Spinal; cord stimulator- turned off as of 3-10-20     IL AV ANAST,UP ARM BASILIC VEIN TRANSPOSIT Left 5/15/2018    TRANSPOSITION STAGE II AV FISTULA - LEFT UPPER ARM performed by Ester Encarnacion MD at [address] [facility] ANGIOACCESS AV FISTULA Left 9/25/2018    SUPERFICIALIZATION AV FISTULA - LEFT ARM performed by Ester Encarnacion MD at [address] [facility] W/VITRECTOMY ANY METH Left 4/10/2018    PARS PLANA VITRECTOMY 25 GAUGE RETINAL DETACHMENT REPAIR air fluid exchange, endolaser performed by Fadumo Alejandra MD at [address] TRANSESOPHAGEAL ECHOCARDIOGRAM  11/11/2021    Dr. Fernando Branch TUNNELED VENOUS CATHETER PLACEMENT  11/15/2017    VITRECTOMY Left 04/10/2018    PARS PLANA VITRECTOMY; RETINAL DETACHMENT REPAIR; GAS BUBBLE; LASER LEFT EYE       Medications Prior to Admission:      Prior to Admission medications    Medication Sig Start Date End Date Taking?  Authorizing Provider   atorvastatin (LIPITOR) 80 MG tablet Take 80 mg by mouth nightly   Yes Historical Provider, MD   bumetanide (BUMEX) 2 MG tablet Take 2 mg by mouth three times a week *[facility]*   Yes Historical Provider, MD   isosorbide mononitrate (IMDUR) 30 MG extended release tablet Take 30 mg by mouth daily   Yes Historical Provider, MD   insulin glargine (LANTUS) 100 UNIT/ML injection vial Inject 11 Units into the skin nightly   Yes Historical Provider, MD   hydrALAZINE (APRESOLINE) 10 MG tablet Take 1 tablet by mouth 2 times daily 2/3/22  Yes Shelli Dixon MD   metoprolol succinate (TOPROL XL) 25 MG extended release tablet Take 1 tablet by mouth daily 11/24/21  Yes Shelli Dixon MD   gabapentin (NEURONTIN) 100 MG capsule Take 1 capsule by mouth 3 times daily for 180 days. Intended supply: 30 days 11/23/21 5/22/22 Yes Becky Toribio MD   lanthanum (FOSRENOL) 1000 MG chewable tablet Take 1,000 mg by mouth 3 times daily (with meals)  8/9/21  Yes Historical Provider, MD   allopurinol (ZYLOPRIM) 100 MG tablet Take 1 tablet by mouth daily 9/7/21  Yes Violetta Soto MD   ticagrelor (BRILINTA) 90 MG TABS tablet Take 1 tablet by mouth 2 times daily 9/7/21  Yes Violetta Soto MD   aspirin 81 MG EC tablet Take 1 tablet by mouth daily 5/26/21  Yes Matthew Douglas MD   B Complex-C-Folic Acid (BONI CAPS) 1 MG CAPS Take 1 mg by mouth nightly    Yes Historical Provider, MD MCHUGH 0.01 % SOLN ophthalmic drops Place 1 drop into the right eye nightly  6/9/20  Yes Historical Provider, MD   COMBIGAN 0.2-0.5 % ophthalmic solution Place 1 drop into the right eye every 12 hours  8/1/19  Yes Historical Provider, MD   lidocaine-prilocaine (EMLA) 2.5-2.5 % cream Apply topically See Admin Instructions APPLY SMALL AMOUNT TO ACCESS SITE (AVF) 30-45 MINUTES BEFORE DIALYSIS. COVER WITH OCCLUSIVE DRESSING ( EUNICE WRAP)    Historical Provider, MD   midodrine (PROAMATINE) 5 MG tablet Take 1 tablet by mouth as needed (may repeat x1 durring HD for assymptomatic SBP<100) 5/25/21   Matthew Douglas MD   omeprazole (PRILOSEC) 20 MG delayed release capsule Take 20 mg by mouth daily as needed (GI DISCOMFORT)     Historical Provider, MD       Allergies:  Furosemide    Social History:    TOBACCO:   reports that she has never smoked. She has never used smokeless tobacco.  ETOH:   reports no history of alcohol use. Family History:    Reviewed in detail and negative for DM, CAD, Cancer, CVA. Positive as follows\"      Problem Relation Age of Onset    Breast Cancer Mother 61    Hypertension Mother     Heart Disease Father     Prostate Cancer Father     Breast Cancer Maternal Grandmother 61       REVIEW OF SYSTEMS:   Pertinent positives as noted in the HPI.  All other systems reviewed and negative. PHYSICAL EXAM:  /60   Pulse 73   Temp 98.2 °F (36.8 °C) (Temporal)   Resp 16   SpO2 98%   General appearance: No apparent distress, appears stated age and cooperative. HEENT: Normal cephalic, atraumatic without obvious deformity. Pupils equal, round, and reactive to light. Extra ocular muscles intact. Conjunctivae/corneas clear. Neck: Supple, with full range of motion. No jugular venous distention. Trachea midline. Respiratory: No crackles no rhonchi no wheezing, clear  Cardiovascular: S1-S2 normal, no rubs gallops or murmurs  Abdomen: Soft nontender nondistended positive bowel sounds  Musculoskeletal: No clubbing, cyanosis, no edema   skin: Normal skin color. No rashes or lesions. Neurologic:  Neurovascularly intact without any focal sensory/motor deficits. Cranial nerves: II-XII intact, grossly non-focal.  Psychiatric: Alert and oriented, thought content appropriate, normal insight    Reviewed EKG and CXR personally    CBC:   Recent Labs     03/08/22  1815   WBC 7.5   RBC 3.95   HGB 11.5   HCT 37.5   MCV 94.9   RDW 17.1*        BMP:   Recent Labs     03/08/22  1815      K 4.7   CL 97*   CO2 30*   BUN 27*   CREATININE 4.7*     LFT:  No results for input(s): PROT, ALB, ALKPHOS, ALT, AST, BILITOT, AMYLASE, LIPASE in the last 72 hours. CE:  No results for input(s): Chyrel Lions in the last 72 hours. PT/INR: No results for input(s): INR, APTT in the last 72 hours. BNP: No results for input(s): BNP in the last 72 hours.   ESR:   Lab Results   Component Value Date    SEDRATE 19 01/20/2022     CRP:   Lab Results   Component Value Date    CRP 0.7 (H) 01/20/2022     D Dimer:   Lab Results   Component Value Date    DDIMER <250 08/27/2011      Folate and B12:   Lab Results   Component Value Date    ACHJZHUC59 1774 (H) 02/17/2017   ,   Lab Results   Component Value Date    FOLATE >20.0 02/17/2017     Lactic Acid:   Lab Results   Component Value Date    LACTA 0.8 11/09/2017     Thyroid Studies:   Lab Results   Component Value Date    TSH 2.810 05/19/2021    A6LJZUM 5.0 12/24/2012       Oupatient labs:  Lab Results   Component Value Date    CHOL 101 05/19/2021    TRIG 106 05/19/2021    HDL 38 05/19/2021    LDLCALC 42 05/19/2021    TSH 2.810 05/19/2021    INR 1.0 01/20/2022    LABA1C 5.7 09/07/2021       Urinalysis:    Lab Results   Component Value Date    NITRU Negative 11/07/2017    WBCUA 1-3 11/07/2017    WBCUA NONE 08/27/2011    BACTERIA MODERATE 11/07/2017    RBCUA 0-1 11/07/2017    RBCUA 1-3 02/13/2013    BLOODU Negative 11/07/2017    SPECGRAV 1.025 11/07/2017    GLUCOSEU Negative 11/07/2017    GLUCOSEU NEGATIVE 08/27/2011       Imaging:  CT Head WO Contrast    Result Date: 3/8/2022  EXAMINATION: CT OF THE HEAD WITHOUT CONTRAST; CT OF THE LUMBAR SPINE WITHOUT CONTRAST; CT OF THE CERVICAL SPINE WITHOUT CONTRAST  3/8/2022 5:17 pm TECHNIQUE: CT of the head was performed without the administration of intravenous contrast. Dose modulation, iterative reconstruction, and/or weight based adjustment of the mA/kV was utilized to reduce the radiation dose to as low as reasonably achievable.; CT of the lumbar spine was performed without the administration of intravenous contrast. Multiplanar reformatted images are provided for review. Adjustment of mA and/or kV according to patient size was utilized. Dose modulation, iterative reconstruction, and/or weight based adjustment of the mA/kV was utilized to reduce the radiation dose to as low as reasonably achievable.; CT of the cervical spine was performed without the administration of intravenous contrast. Multiplanar reformatted images are provided for review. Dose modulation, iterative reconstruction, and/or weight based adjustment of the mA/kV was utilized to reduce the radiation dose to as low as reasonably achievable. COMPARISON: None.  HISTORY: ORDERING SYSTEM PROVIDED HISTORY: fall TECHNOLOGIST PROVIDED HISTORY: Reason for exam:->fall Has a \"code stroke\" or \"stroke alert\" been called? ->No Decision Support Exception - unselect if not a suspected or confirmed emergency medical condition->Emergency Medical Condition (MA) FINDINGS: CT OF THE BRAIN: BRAIN/VENTRICLES: No mass effect, edema or hemorrhage is seen. No significant volume loss or chronic microvascular ischemic changes are appreciated. No hydrocephalus or extra-axial fluid is seen. ORBITS: Prosthetic lenses are seen bilaterally. Glaucoma treatment prostheses are seen along the lateral margins of the globes bilaterally. The orbits are otherwise unremarkable. SINUSES: The visualized paranasal sinuses and mastoid air cells demonstrate no acute abnormality. SOFT TISSUES/SKULL: No acute abnormality of the visualized skull or soft tissues. CT CERVICAL SPINE: BONES/ALIGNMENT: There is no acute fracture or traumatic malalignment. DEGENERATIVE CHANGES: Prominent loss of disc heights from C3-4 to C7-T1. Small disc osteophyte complexes at these levels resulting in mild central canal stenoses. Mild and moderate neural foraminal stenoses noted at multiple levels. SOFT TISSUES: There is no prevertebral soft tissue swelling. Multiple thyroid nodules are seen, the largest measuring approximately 3.2 cm in the left thyroid lobe. The nodule is only partially included in the field of view of this study. CT OF THE LUMBAR SPINE: BONES/ALIGNMENT: There no evidence of an acute fracture. Status post decompressive laminectomies with posterior interbody fusion from L3-L5. Prominent degenerative changes as described below SOFT TISSUES: No paraspinal mass identified. L1-L2: Prominent loss of disc height with a disc osteophyte. Moderate facet and ligamentous hypertrophy. Moderate central canal stenosis, with narrowing of the AP diameter of the thecal sac in the midsagittal plane to 7 mm. Moderate to severe lateral recess and neural foraminal stenoses.  L2-L3: Severe loss of disc height with chronic fracture along the inferior endplate the L2 vertebral body. Grade 2 anterolisthesis L2 over L3 by approximately 11 mm. Severe central canal stenosis, with narrowing of the AP diameter of the thecal sac in the midsagittal plane to 3 mm. Severe lateral recess and neural foraminal stenoses. L3-L4: Severe loss of disc height. Disc space allograft in situ demonstrating incorporation with the adjacent endplates. Small disc osteophyte complex. Decompressive laminectomies and medial facetectomies. No significant central canal or lateral recess stenosis. Mild neural foraminal stenoses. L4-L5: Postoperative changes, with incorporation of the disc space allograft into the adjacent endplates. No significant central canal, lateral recess or neural foraminal stenoses. L5-S1: Prominent loss of disc height with a left-sided disc osteophyte complex. Mild facet hypertrophy. No central canal stenosis. Moderate stenosis of the left lateral recess. Moderate right and moderate to severe left neural foraminal stenoses. CT head without contrast: 1. No skull fracture or acute intracranial abnormality. CT cervical spine without contrast: 1. No fracture or joint dislocation is seen. 2. Degenerative changes, as described. 3. Thyroid nodules, with the dominant on the left measuring 3.2 cm. Per the recommendations of the [employer] of [employer], follow-up thyroid sonography is recommended. CT of the lumbar spine: 1. No acute fracture or bony destructive lesion. 2.  Status post decompressive laminectomies with posterior interbody fusion from L3-L5. 3. Severe loss of disc height with grade 2 anterolisthesis of L2 over L3 and chronic loss of vertebral body height at L2. 4. Severe central canal stenosis at L2-3. Moderate stenosis at L1-2. 5.  Multilevel neural foraminal stenoses, worst (severe) at L2-3. Moderate to severe stenoses at L1-2.  RECOMMENDATIONS: Unavailable     CT Cervical Spine WO Contrast    Result Date: 3/8/2022  EXAMINATION: CT OF THE HEAD WITHOUT CONTRAST; CT OF THE LUMBAR SPINE WITHOUT CONTRAST; CT OF THE CERVICAL SPINE WITHOUT CONTRAST  3/8/2022 5:17 pm TECHNIQUE: CT of the head was performed without the administration of intravenous contrast. Dose modulation, iterative reconstruction, and/or weight based adjustment of the mA/kV was utilized to reduce the radiation dose to as low as reasonably achievable.; CT of the lumbar spine was performed without the administration of intravenous contrast. Multiplanar reformatted images are provided for review. Adjustment of mA and/or kV according to patient size was utilized. Dose modulation, iterative reconstruction, and/or weight based adjustment of the mA/kV was utilized to reduce the radiation dose to as low as reasonably achievable.; CT of the cervical spine was performed without the administration of intravenous contrast. Multiplanar reformatted images are provided for review. Dose modulation, iterative reconstruction, and/or weight based adjustment of the mA/kV was utilized to reduce the radiation dose to as low as reasonably achievable. COMPARISON: None. HISTORY: ORDERING SYSTEM PROVIDED HISTORY: fall TECHNOLOGIST PROVIDED HISTORY: Reason for exam:->fall Has a \"code stroke\" or \"stroke alert\" been called? ->No Decision Support Exception - unselect if not a suspected or confirmed emergency medical condition->Emergency Medical Condition (MA) FINDINGS: CT OF THE BRAIN: BRAIN/VENTRICLES: No mass effect, edema or hemorrhage is seen. No significant volume loss or chronic microvascular ischemic changes are appreciated. No hydrocephalus or extra-axial fluid is seen. ORBITS: Prosthetic lenses are seen bilaterally. Glaucoma treatment prostheses are seen along the lateral margins of the globes bilaterally. The orbits are otherwise unremarkable. SINUSES: The visualized paranasal sinuses and mastoid air cells demonstrate no acute abnormality.  SOFT TISSUES/SKULL: No acute abnormality of the visualized skull or soft tissues. CT CERVICAL SPINE: BONES/ALIGNMENT: There is no acute fracture or traumatic malalignment. DEGENERATIVE CHANGES: Prominent loss of disc heights from C3-4 to C7-T1. Small disc osteophyte complexes at these levels resulting in mild central canal stenoses. Mild and moderate neural foraminal stenoses noted at multiple levels. SOFT TISSUES: There is no prevertebral soft tissue swelling. Multiple thyroid nodules are seen, the largest measuring approximately 3.2 cm in the left thyroid lobe. The nodule is only partially included in the field of view of this study. CT OF THE LUMBAR SPINE: BONES/ALIGNMENT: There no evidence of an acute fracture. Status post decompressive laminectomies with posterior interbody fusion from L3-L5. Prominent degenerative changes as described below SOFT TISSUES: No paraspinal mass identified. L1-L2: Prominent loss of disc height with a disc osteophyte. Moderate facet and ligamentous hypertrophy. Moderate central canal stenosis, with narrowing of the AP diameter of the thecal sac in the midsagittal plane to 7 mm. Moderate to severe lateral recess and neural foraminal stenoses. L2-L3: Severe loss of disc height with chronic fracture along the inferior endplate the L2 vertebral body. Grade 2 anterolisthesis L2 over L3 by approximately 11 mm. Severe central canal stenosis, with narrowing of the AP diameter of the thecal sac in the midsagittal plane to 3 mm. Severe lateral recess and neural foraminal stenoses. L3-L4: Severe loss of disc height. Disc space allograft in situ demonstrating incorporation with the adjacent endplates. Small disc osteophyte complex. Decompressive laminectomies and medial facetectomies. No significant central canal or lateral recess stenosis. Mild neural foraminal stenoses. L4-L5: Postoperative changes, with incorporation of the disc space allograft into the adjacent endplates.   No significant central canal, lateral recess or neural foraminal stenoses. L5-S1: Prominent loss of disc height with a left-sided disc osteophyte complex. Mild facet hypertrophy. No central canal stenosis. Moderate stenosis of the left lateral recess. Moderate right and moderate to severe left neural foraminal stenoses. CT head without contrast: 1. No skull fracture or acute intracranial abnormality. CT cervical spine without contrast: 1. No fracture or joint dislocation is seen. 2. Degenerative changes, as described. 3. Thyroid nodules, with the dominant on the left measuring 3.2 cm. Per the recommendations of the [employer] of [employer], follow-up thyroid sonography is recommended. CT of the lumbar spine: 1. No acute fracture or bony destructive lesion. 2.  Status post decompressive laminectomies with posterior interbody fusion from L3-L5. 3. Severe loss of disc height with grade 2 anterolisthesis of L2 over L3 and chronic loss of vertebral body height at L2. 4. Severe central canal stenosis at L2-3. Moderate stenosis at L1-2. 5.  Multilevel neural foraminal stenoses, worst (severe) at L2-3. Moderate to severe stenoses at L1-2. RECOMMENDATIONS: Unavailable     CT Lumbar Spine WO Contrast    Result Date: 3/8/2022  EXAMINATION: CT OF THE HEAD WITHOUT CONTRAST; CT OF THE LUMBAR SPINE WITHOUT CONTRAST; CT OF THE CERVICAL SPINE WITHOUT CONTRAST  3/8/2022 5:17 pm TECHNIQUE: CT of the head was performed without the administration of intravenous contrast. Dose modulation, iterative reconstruction, and/or weight based adjustment of the mA/kV was utilized to reduce the radiation dose to as low as reasonably achievable.; CT of the lumbar spine was performed without the administration of intravenous contrast. Multiplanar reformatted images are provided for review. Adjustment of mA and/or kV according to patient size was utilized.   Dose modulation, iterative reconstruction, and/or weight based adjustment of the mA/kV was utilized to reduce the radiation dose to as low as reasonably achievable.; CT of the cervical spine was performed without the administration of intravenous contrast. Multiplanar reformatted images are provided for review. Dose modulation, iterative reconstruction, and/or weight based adjustment of the mA/kV was utilized to reduce the radiation dose to as low as reasonably achievable. COMPARISON: None. HISTORY: ORDERING SYSTEM PROVIDED HISTORY: fall TECHNOLOGIST PROVIDED HISTORY: Reason for exam:->fall Has a \"code stroke\" or \"stroke alert\" been called? ->No Decision Support Exception - unselect if not a suspected or confirmed emergency medical condition->Emergency Medical Condition (MA) FINDINGS: CT OF THE BRAIN: BRAIN/VENTRICLES: No mass effect, edema or hemorrhage is seen. No significant volume loss or chronic microvascular ischemic changes are appreciated. No hydrocephalus or extra-axial fluid is seen. ORBITS: Prosthetic lenses are seen bilaterally. Glaucoma treatment prostheses are seen along the lateral margins of the globes bilaterally. The orbits are otherwise unremarkable. SINUSES: The visualized paranasal sinuses and mastoid air cells demonstrate no acute abnormality. SOFT TISSUES/SKULL: No acute abnormality of the visualized skull or soft tissues. CT CERVICAL SPINE: BONES/ALIGNMENT: There is no acute fracture or traumatic malalignment. DEGENERATIVE CHANGES: Prominent loss of disc heights from C3-4 to C7-T1. Small disc osteophyte complexes at these levels resulting in mild central canal stenoses. Mild and moderate neural foraminal stenoses noted at multiple levels. SOFT TISSUES: There is no prevertebral soft tissue swelling. Multiple thyroid nodules are seen, the largest measuring approximately 3.2 cm in the left thyroid lobe. The nodule is only partially included in the field of view of this study. CT OF THE LUMBAR SPINE: BONES/ALIGNMENT: There no evidence of an acute fracture.   Status post decompressive laminectomies with posterior interbody fusion from L3-L5. Prominent degenerative changes as described below SOFT TISSUES: No paraspinal mass identified. L1-L2: Prominent loss of disc height with a disc osteophyte. Moderate facet and ligamentous hypertrophy. Moderate central canal stenosis, with narrowing of the AP diameter of the thecal sac in the midsagittal plane to 7 mm. Moderate to severe lateral recess and neural foraminal stenoses. L2-L3: Severe loss of disc height with chronic fracture along the inferior endplate the L2 vertebral body. Grade 2 anterolisthesis L2 over L3 by approximately 11 mm. Severe central canal stenosis, with narrowing of the AP diameter of the thecal sac in the midsagittal plane to 3 mm. Severe lateral recess and neural foraminal stenoses. L3-L4: Severe loss of disc height. Disc space allograft in situ demonstrating incorporation with the adjacent endplates. Small disc osteophyte complex. Decompressive laminectomies and medial facetectomies. No significant central canal or lateral recess stenosis. Mild neural foraminal stenoses. L4-L5: Postoperative changes, with incorporation of the disc space allograft into the adjacent endplates. No significant central canal, lateral recess or neural foraminal stenoses. L5-S1: Prominent loss of disc height with a left-sided disc osteophyte complex. Mild facet hypertrophy. No central canal stenosis. Moderate stenosis of the left lateral recess. Moderate right and moderate to severe left neural foraminal stenoses. CT head without contrast: 1. No skull fracture or acute intracranial abnormality. CT cervical spine without contrast: 1. No fracture or joint dislocation is seen. 2. Degenerative changes, as described. 3. Thyroid nodules, with the dominant on the left measuring 3.2 cm. Per the recommendations of the [employer] of [employer], follow-up thyroid sonography is recommended. CT of the lumbar spine: 1.   No acute fracture or bony destructive lesion. 2.  Status post decompressive laminectomies with posterior interbody fusion from L3-L5. 3. Severe loss of disc height with grade 2 anterolisthesis of L2 over L3 and chronic loss of vertebral body height at L2. 4. Severe central canal stenosis at L2-3. Moderate stenosis at L1-2. 5.  Multilevel neural foraminal stenoses, worst (severe) at L2-3. Moderate to severe stenoses at L1-2. RECOMMENDATIONS: Unavailable       ASSESSMENT:    Principal Problem:    Lumbar foraminal stenosis  Active Problems:    Lumbar stenosis without neurogenic claudication  Resolved Problems:    * No resolved hospital problems. *    Assessment and plan:  1. Back pain with spinal stenosis: Cervical and lumbar spine CT consistent with  Severe central canal stenosis at L2-3.  Moderate stenosis at L1-2.   5.  Multilevel neural foraminal stenoses, worst (severe) at L2-3.  Moderate   to severe stenoses at L1-2. Pain control as indicated. Neurosurgery consultation. 2.  End-stage renal disease on hemodialysis: Monday Wednesday Friday, consult nephrology for renal replacement therapy. 3.  Chronic anemia secondary to end-stage renal disease: Monitor H&H, continue JAYSHREE. 4.  Essential hypertension: Continue outpatient medications with hydralazine and metoprolol. 5.  Hyperlipidemia: Continue statin    6. Diabetes type 2 with end-stage renal disease: Placed on insulin sliding scale    7.   Gout without acute attack: Continue allopurinol        Diet: Diet NPO  Code Status: Full Code    Surrogate decision maker confirmed with patient:  Primary Emergency Contact: Michelmeredith Osorio, Home Phone: [phone]    DVT Prophylaxis: []Lovenox []Heparin [x]PCD [] [address] []Encouraged ambulation    Disposition: [x]Med/Surg [] Intermediate [] ICU/CCU  Admit status: [x] Observation [] Inpatient     +++++++++++++++++++++++++++++++++++++++++++++++++  Kana Cleaning MD  [address] [place], [place]  +++++++++++++++++++++++++++++++++++++++++++++++++  NOTE: This report was transcribed using voice recognition software. Every effort was made to ensure accuracy; however, inadvertent computerized transcription errors may be present.

## 2022-03-10 NOTE — PROGRESS NOTES
Dr Medardo Richter seen patient on unit and stated the patient needs tele for surgery. Messaged Dr Marshal Arriaga to inquire if telemetry is needed pre-op. Orders given for telemetry. Order placed.

## 2022-03-10 NOTE — PROGRESS NOTES
Pt is stating that the Gailen Pride is not working and she is requesting something else. Pt is also requesting extra strength Tylenol. Messaged Dr Lauryn Brown regarding this. Awaiting response.

## 2022-03-10 NOTE — CONSULTS
[facility]  Nephrology Consult Note  Patient's Name: Jose Nash    Reason for Consult: ESRD on HD MWF    History Obtained From:  patient, past medical records and EMR    History of Present Illness:    Jose Nash is a 78 y.o. female with a past medical history of breast CA, anemia, hypertension, hyperlipidemia, and glaucoma. She presented to [facility] ED on 3/8 following a fall. Per the ED provider note the patient had back pain/had fell. She was dialyzed on 3/9 at [facility]. Patient was transferred to [facility], as she can see neurosurgery here. Lab data today includes potassium 4.7, BUN 26, creatinine 4.7, and hemoglobin 11.7. We were consulted to see the patient for ESRD on HD MWF. Patient is known to our service and dialyzes at [facility] MWF via a left AV fistula. At present, patient was seen and examined. She has family at the bedside. She reports that she had a fall after feeling weakness and pain in her back, on Tuesday morning. She explained that she was unable to ambulate at that time. She denies any current chest pain or shortness of breath. No abdominal pain, nausea, or vomiting. She denied any dizziness. Past Medical History:   Diagnosis Date    Acute infection of bone ([address]     infection of rt foot, resolved.     Acute osteomyelitis of phalanx of left hand ([address] 1/27/2022    Left third distal phalanx    Anemia of chronic disease     Arthritis     Breast cancer ([address]     right breast, 2008/ bladder, 2006- last chemotherapy \"years ago\"    CAD (coronary artery disease)     Carpal tunnel syndrome     bilat - for OR left hand 3-17-20     Chronic diastolic CHF (congestive heart failure) ([address] 09/23/2014 9/23/14- echocardiogram revealed moderate LV concentric hypertrophy, stage III diastolic dysfunction, mild tricuspid regurgitation    CKD (chronic kidney disease) stage 4, GFR 15-29 ml/min ([facility])     Diabetic retinopathy ([address]     Glaucoma     Hemodialysis patient ([address]     [facility] wed fri- Dr. Bharati Gonzalez - left arm fistula     Hyperkalemia, diminished renal excretion 11/9/2017    Hyperlipidemia     Hypertension     Hypoglycemia unawareness in type 1 diabetes mellitus ([address] 11/7/2017    Insulin dependent type 2 diabetes mellitus ([address]     Neuropathy     feet    Osteomyelitis due to secondary diabetes ([address]     rt great toe with amputation    Patient is Religion 11/7/2017    Refusal of blood product     patient states she dose not take blood transfusion    Ventricular hypertrophy     Vitreous hemorrhage ([address]     left eye     Past Surgical History:   Procedure Laterality Date    AMPUTATION      right great toe    ANKLE SURGERY      correction on charcot joint of right ankle    CARDIAC CATHETERIZATION  12/09/2021    Dr Alonso Ivan Left 3/17/2020    LEFT CARPAL TUNNEL RELEASE performed by Judie Khalil MD at [address]      bilateral    CHOLECYSTECTOMY      COLONOSCOPY      CYSTOSCOPY      DIALYSIS FISTULA CREATION Left 01/31/2018    upper arm/Dr. Dov Landrum    ECHO COMPL W DOP COLOR FLOW  2/14/2013         ECHO COMPLETE  9/17/2013         FINGER AMPUTATION Left 1/20/2022    AMPUTATION OF LEFT THIRD DIGIT, DISTAL PHALANX performed by Enoch Pacheco MD at [address]      right    OTHER SURGICAL HISTORY  9/27/2011    PPV, membranectomy, laser Right eye    OTHER SURGICAL HISTORY  insertion lumbar drain insertion    10/12/`14    OTHER SURGICAL HISTORY  10/22/15    percutaneous lead placement for spinal cord stimulator    OTHER SURGICAL HISTORY  11/03/2015    Spinal; cord stimulator- turned off as of 3-10-20     FL AV ANAST,UP ARM BASILIC VEIN TRANSPOSIT Left 5/15/2018    TRANSPOSITION STAGE II AV FISTULA - LEFT UPPER ARM performed by Jacqui Walker MD at [facility] ANGIOACCESS AV FISTULA Left 9/25/2018    SUPERFICIALIZATION AV FISTULA - LEFT ARM performed by Titus Gomez MD at [address] METH Left 4/10/2018    PARS PLANA VITRECTOMY 25 GAUGE RETINAL DETACHMENT REPAIR air fluid exchange, endolaser performed by Pj May MD at [address]      L2    TRANSESOPHAGEAL ECHOCARDIOGRAM  11/11/2021    Dr. Elizabeth Frank TUNNELED VENOUS CATHETER PLACEMENT  11/15/2017    VITRECTOMY Left 04/10/2018    PARS PLANA VITRECTOMY; RETINAL DETACHMENT REPAIR; GAS BUBBLE; LASER LEFT EYE     Family History   Problem Relation Age of Onset    Breast Cancer Mother 61    Hypertension Mother     Heart Disease Father     Prostate Cancer Father     Breast Cancer Maternal Grandmother 60      reports that she has never smoked. She has never used smokeless tobacco. She reports that she does not drink alcohol and does not use drugs.     Allergies:  Furosemide    Current Medications:    sodium chloride flush 0.9 % injection 5-40 mL, 2 times per day  sodium chloride flush 0.9 % injection 5-40 mL, PRN  0.9 % sodium chloride infusion, PRN  ondansetron (ZOFRAN-ODT) disintegrating tablet 4 mg, Q8H PRN   Or  ondansetron (ZOFRAN) injection 4 mg, Q6H PRN  polyethylene glycol (GLYCOLAX) packet 17 g, Daily PRN  acetaminophen (TYLENOL) tablet 650 mg, Q6H PRN   Or  acetaminophen (TYLENOL) suppository 650 mg, Q6H PRN  HYDROcodone-acetaminophen (NORCO) 5-325 MG per tablet 1 tablet, Q6H PRN  insulin lispro (HUMALOG) injection vial 0-6 Units, TID WC  insulin lispro (HUMALOG) injection vial 0-3 Units, Nightly  allopurinol (ZYLOPRIM) tablet 100 mg, Daily  atorvastatin (LIPITOR) tablet 80 mg, Nightly  gabapentin (NEURONTIN) capsule 100 mg, TID  hydrALAZINE (APRESOLINE) tablet 10 mg, BID  isosorbide mononitrate (IMDUR) extended release tablet 30 mg, Daily  lanthanum (FOSRENOL) chewable tablet 1,000 mg, TID WC  metoprolol succinate (TOPROL XL) extended release tablet 25 mg, Daily  midodrine (PROAMATINE) tablet 5 mg, PRN  pantoprazole (PROTONIX) tablet 40 mg, QAM AC  0.9 % sodium chloride bolus, Once      No current facility-administered medications on file prior to encounter. Current Outpatient Medications on File Prior to Encounter   Medication Sig Dispense Refill    atorvastatin (LIPITOR) 80 MG tablet Take 80 mg by mouth nightly      bumetanide (BUMEX) 2 MG tablet Take 2 mg by mouth three times a week *SUN-TUE-THUR*      isosorbide mononitrate (IMDUR) 30 MG extended release tablet Take 30 mg by mouth daily      insulin glargine (LANTUS) 100 UNIT/ML injection vial Inject 11 Units into the skin nightly      hydrALAZINE (APRESOLINE) 10 MG tablet Take 1 tablet by mouth 2 times daily 180 tablet 1    metoprolol succinate (TOPROL XL) 25 MG extended release tablet Take 1 tablet by mouth daily 90 tablet 1    gabapentin (NEURONTIN) 100 MG capsule Take 1 capsule by mouth 3 times daily for 180 days. Intended supply: 30 days 90 capsule 5    lanthanum (FOSRENOL) 1000 MG chewable tablet Take 1,000 mg by mouth 3 times daily (with meals)       allopurinol (ZYLOPRIM) 100 MG tablet Take 1 tablet by mouth daily 90 tablet 1    ticagrelor (BRILINTA) 90 MG TABS tablet Take 1 tablet by mouth 2 times daily 180 tablet 1    aspirin 81 MG EC tablet Take 1 tablet by mouth daily 30 tablet 3    B Complex-C-Folic Acid (BONI CAPS) 1 MG CAPS Take 1 mg by mouth nightly       LUMIGAN 0.01 % SOLN ophthalmic drops Place 1 drop into the right eye nightly       COMBIGAN 0.2-0.5 % ophthalmic solution Place 1 drop into the right eye every 12 hours   7    lidocaine-prilocaine (EMLA) 2.5-2.5 % cream Apply topically See Admin Instructions APPLY SMALL AMOUNT TO ACCESS SITE (AVF) 30-45 MINUTES BEFORE DIALYSIS.   COVER WITH OCCLUSIVE DRESSING ( SARAN WRAP)      midodrine (PROAMATINE) 5 MG tablet Take 1 tablet by mouth as needed (may repeat x1 durring HD for assymptomatic SBP<100) 90 tablet 0    omeprazole (PRILOSEC) 20 MG delayed release capsule Take 20 mg by mouth daily as needed (GI DISCOMFORT)         Review of Systems:   Pertinent items are noted in HPI. Physical exam:  Vitals:    03/10/22 0730   BP: (!) 86/42   Pulse: 69   Resp: 16   Temp: 98.1 °F (36.7 °C)   SpO2: 94%     Wt Readings from Last 3 Encounters:   03/10/22 184 lb (83.5 kg)   03/09/22 184 lb 1.4 oz (83.5 kg)   01/20/22 181 lb (82.1 kg)     Temp Readings from Last 3 Encounters:   03/10/22 98.1 °F (36.7 °C) (Temporal)   03/09/22 98.2 °F (36.8 °C)   02/28/22 98.1 °F (36.7 °C)     BP Readings from Last 3 Encounters:   03/10/22 (!) 86/42   03/09/22 102/60   01/20/22 (!) 165/74     Pulse Readings from Last 3 Encounters:   03/10/22 69   03/09/22 74   01/20/22 62       General: Awake, alert, no acute distress  Neck: No JVD noted  Lungs: Clear bilaterally, no adventitious lung sounds, unlabored  CV: Regular rate and rhythm. No rub  Abd: Soft, nontender, nondistended. Active bowel sounds. Skin: Warm and dry. No rash on exposed extremities  Ext: No edema; LUE AV fistula  Neuro: Awake, answers questions appropriately    I/O last 3 completed shifts: In: 48 [P.O.:50]  Out: -   No intake/output data recorded.     Data:   Labs:  Lab Results   Component Value Date     03/10/2022     03/08/2022     01/20/2022    K 4.7 03/10/2022    K 4.7 03/08/2022    K 3.8 01/20/2022    CL 99 03/10/2022    CO2 29 03/10/2022    CO2 30 (H) 03/08/2022    CO2 29 01/20/2022    CREATININE 4.7 (H) 03/10/2022    CREATININE 4.7 (H) 03/08/2022    CREATININE 3.9 (H) 01/20/2022    BUN 26 (H) 03/10/2022    BUN 27 (H) 03/08/2022    BUN 20 01/20/2022    GLUCOSE 100 (H) 03/10/2022    GLUCOSE 146 (H) 03/08/2022    GLUCOSE 76 01/20/2022    PHOS 3.1 05/25/2021    PHOS 4.1 05/24/2021    PHOS 4.1 05/23/2021    WBC 6.9 03/10/2022    WBC 7.5 03/08/2022    WBC 3.9 (L) 01/20/2022    HGB 11.7 03/10/2022    HGB 11.5 03/08/2022    HGB 9.4 (L) 01/20/2022    HCT 37.8 03/10/2022    HCT 37.5 03/08/2022    HCT 30.4 (L) 01/20/2022    MCV 92.6 03/10/2022     03/10/2022     Lab Results   Component Value Date     03/10/2022    K 4.7 03/10/2022    CL 99 03/10/2022    CO2 29 03/10/2022    BUN 26 03/10/2022    CREATININE 4.7 03/10/2022    GLUCOSE 100 03/10/2022    GLUCOSE 46 04/02/2012    CALCIUM 9.8 03/10/2022       Recent Labs     03/10/22  0539 03/08/22  1815   WBC 6.9 7.5   HGB 11.7 11.5   HCT 37.8 37.5   MCV 92.6 94.9    181      Lab Results   Component Value Date    COLORU Yellow 11/07/2017    NITRU Negative 11/07/2017    GLUCOSEU Negative 11/07/2017    GLUCOSEU NEGATIVE 08/27/2011    KETUA Negative 11/07/2017    UROBILINOGEN 0.2 11/07/2017    BILIRUBINUR Negative 11/07/2017    BILIRUBINUR NEGATIVE 08/27/2011      Lab Results   Component Value Date    CALCIUM 9.8 03/10/2022    PHOS 3.1 05/25/2021      Lab Results   Component Value Date    IRON 33 (L) 11/09/2017    TIBC 222 (L) 11/09/2017    FERRITIN 334 11/09/2017     Imaging:  No results found. No results found for this or any previous visit. Assessment/ Plans:    1. ESRD on HD  OP [facility] MWF   left AV fistula  S/p dialysis 3/9 at [facility]  Continue HD MWF    2.  Back pain  S/p fall 3/8  CT lumbar spine 3/8 negative for fracture, \"severe central canal stenosis\"  for OR possibly tomorrow pending cardiology clearance  Neurosurgery following    3. Hypertension with ESRD  BP goal< 140/90  BP at goal  Follow on current regimen/with HD    4. Secondary hyperparathyroidism of renal origin  Last PTH from 2/16/2022 in the outpatient setting was 293  Will check PTH, Phos, vitamin D in the am   Follow    Thank you for allowing us to participate in the care of [address] [place] [address]    Patient seen and examined all key components of the physical performed independently , case discussed with NP, all pertinent labs and radiologic tests personally reviewed agree with above.       Jennifer Cooper MD

## 2022-03-11 ENCOUNTER — ANESTHESIA (OUTPATIENT)
Dept: INPATIENT UNIT | Age: 67
DRG: 551 | End: 2022-03-11
Payer: COMMERCIAL

## 2022-03-11 ENCOUNTER — ANESTHESIA EVENT (OUTPATIENT)
Dept: INPATIENT UNIT | Age: 67
DRG: 551 | End: 2022-03-11
Payer: COMMERCIAL

## 2022-03-11 PROBLEM — M54.9 BACK PAIN: Status: ACTIVE | Noted: 2022-03-11

## 2022-03-11 LAB
ALBUMIN SERPL-MCNC: 3.5 G/DL (ref 3.5–5.2)
ALP BLD-CCNC: 77 U/L (ref 35–104)
ALT SERPL-CCNC: 18 U/L (ref 0–32)
ANION GAP SERPL CALCULATED.3IONS-SCNC: 13 MMOL/L (ref 7–16)
AST SERPL-CCNC: 27 U/L (ref 0–31)
BASOPHILS ABSOLUTE: 0.03 E9/L (ref 0–0.2)
BASOPHILS RELATIVE PERCENT: 0.4 % (ref 0–2)
BILIRUB SERPL-MCNC: 0.8 MG/DL (ref 0–1.2)
BUN BLDV-MCNC: 38 MG/DL (ref 6–23)
CALCIUM SERPL-MCNC: 9.4 MG/DL (ref 8.6–10.2)
CHLORIDE BLD-SCNC: 97 MMOL/L (ref 98–107)
CO2: 27 MMOL/L (ref 22–29)
CREAT SERPL-MCNC: 6.2 MG/DL (ref 0.5–1)
EOSINOPHILS ABSOLUTE: 0.13 E9/L (ref 0.05–0.5)
EOSINOPHILS RELATIVE PERCENT: 1.9 % (ref 0–6)
GFR AFRICAN AMERICAN: 8
GFR NON-AFRICAN AMERICAN: 8 ML/MIN/1.73
GLUCOSE BLD-MCNC: 100 MG/DL (ref 74–99)
HCT VFR BLD CALC: 35 % (ref 34–48)
HEMOGLOBIN: 10.8 G/DL (ref 11.5–15.5)
IMMATURE GRANULOCYTES #: 0.03 E9/L
IMMATURE GRANULOCYTES %: 0.4 % (ref 0–5)
LYMPHOCYTES ABSOLUTE: 0.86 E9/L (ref 1.5–4)
LYMPHOCYTES RELATIVE PERCENT: 12.4 % (ref 20–42)
MAGNESIUM: 2.4 MG/DL (ref 1.6–2.6)
MCH RBC QN AUTO: 28.8 PG (ref 26–35)
MCHC RBC AUTO-ENTMCNC: 30.9 % (ref 32–34.5)
MCV RBC AUTO: 93.3 FL (ref 80–99.9)
METER GLUCOSE: 129 MG/DL (ref 74–99)
METER GLUCOSE: 135 MG/DL (ref 74–99)
METER GLUCOSE: 158 MG/DL (ref 74–99)
METER GLUCOSE: 79 MG/DL (ref 74–99)
MONOCYTES ABSOLUTE: 0.83 E9/L (ref 0.1–0.95)
MONOCYTES RELATIVE PERCENT: 12 % (ref 2–12)
NEUTROPHILS ABSOLUTE: 5.04 E9/L (ref 1.8–7.3)
NEUTROPHILS RELATIVE PERCENT: 72.9 % (ref 43–80)
PARATHYROID HORMONE INTACT: 241 PG/ML (ref 15–65)
PDW BLD-RTO: 16.8 FL (ref 11.5–15)
PHOSPHORUS: 5.8 MG/DL (ref 2.5–4.5)
PLATELET # BLD: 189 E9/L (ref 130–450)
PMV BLD AUTO: 11.5 FL (ref 7–12)
POTASSIUM REFLEX MAGNESIUM: 5.1 MMOL/L (ref 3.5–5)
POTASSIUM SERPL-SCNC: 5.1 MMOL/L (ref 3.5–5)
RBC # BLD: 3.75 E12/L (ref 3.5–5.5)
SODIUM BLD-SCNC: 137 MMOL/L (ref 132–146)
TOTAL PROTEIN: 6.5 G/DL (ref 6.4–8.3)
VITAMIN D 25-HYDROXY: 28 NG/ML (ref 30–100)
WBC # BLD: 6.9 E9/L (ref 4.5–11.5)

## 2022-03-11 PROCEDURE — 83735 ASSAY OF MAGNESIUM: CPT

## 2022-03-11 PROCEDURE — 82306 VITAMIN D 25 HYDROXY: CPT

## 2022-03-11 PROCEDURE — 85025 COMPLETE CBC W/AUTO DIFF WBC: CPT

## 2022-03-11 PROCEDURE — 6370000000 HC RX 637 (ALT 250 FOR IP): Performed by: INTERNAL MEDICINE

## 2022-03-11 PROCEDURE — G0378 HOSPITAL OBSERVATION PER HR: HCPCS

## 2022-03-11 PROCEDURE — 90935 HEMODIALYSIS ONE EVALUATION: CPT

## 2022-03-11 PROCEDURE — 83970 ASSAY OF PARATHORMONE: CPT

## 2022-03-11 PROCEDURE — 2700000000 HC OXYGEN THERAPY PER DAY

## 2022-03-11 PROCEDURE — 82962 GLUCOSE BLOOD TEST: CPT

## 2022-03-11 PROCEDURE — 2060000000 HC ICU INTERMEDIATE R&B

## 2022-03-11 PROCEDURE — 80048 BASIC METABOLIC PNL TOTAL CA: CPT

## 2022-03-11 PROCEDURE — 2580000003 HC RX 258: Performed by: INTERNAL MEDICINE

## 2022-03-11 PROCEDURE — 84100 ASSAY OF PHOSPHORUS: CPT

## 2022-03-11 PROCEDURE — 80053 COMPREHEN METABOLIC PANEL: CPT

## 2022-03-11 PROCEDURE — 1200000000 HC SEMI PRIVATE

## 2022-03-11 PROCEDURE — L0627 LO SAG RI AN/POS PNL PRE CST: HCPCS

## 2022-03-11 PROCEDURE — 36415 COLL VENOUS BLD VENIPUNCTURE: CPT

## 2022-03-11 PROCEDURE — 5A1D70Z PERFORMANCE OF URINARY FILTRATION, INTERMITTENT, LESS THAN 6 HOURS PER DAY: ICD-10-PCS | Performed by: INTERNAL MEDICINE

## 2022-03-11 RX ADMIN — TRAMADOL HYDROCHLORIDE 50 MG: 50 TABLET, COATED ORAL at 05:20

## 2022-03-11 RX ADMIN — ACETAMINOPHEN 650 MG: 325 TABLET ORAL at 18:18

## 2022-03-11 RX ADMIN — GABAPENTIN 100 MG: 100 CAPSULE ORAL at 20:41

## 2022-03-11 RX ADMIN — ATORVASTATIN CALCIUM 80 MG: 40 TABLET, FILM COATED ORAL at 20:41

## 2022-03-11 RX ADMIN — TRAMADOL HYDROCHLORIDE 50 MG: 50 TABLET, COATED ORAL at 16:08

## 2022-03-11 RX ADMIN — PANTOPRAZOLE SODIUM 40 MG: 40 TABLET, DELAYED RELEASE ORAL at 05:20

## 2022-03-11 RX ADMIN — Medication 10 ML: at 21:36

## 2022-03-11 RX ADMIN — HYDRALAZINE HYDROCHLORIDE 10 MG: 10 TABLET, FILM COATED ORAL at 17:27

## 2022-03-11 RX ADMIN — LANTHANUM CARBONATE 1000 MG: 500 TABLET, CHEWABLE ORAL at 17:28

## 2022-03-11 RX ADMIN — GABAPENTIN 100 MG: 100 CAPSULE ORAL at 14:01

## 2022-03-11 ASSESSMENT — PAIN DESCRIPTION - LOCATION
LOCATION: BACK

## 2022-03-11 ASSESSMENT — PAIN DESCRIPTION - DESCRIPTORS
DESCRIPTORS: ACHING;DISCOMFORT;SORE
DESCRIPTORS: ACHING;DISCOMFORT;SORE
DESCRIPTORS: ACHING;DISCOMFORT
DESCRIPTORS: ACHING;DISCOMFORT;SORE
DESCRIPTORS: ACHING;DISCOMFORT

## 2022-03-11 ASSESSMENT — PAIN DESCRIPTION - PROGRESSION
CLINICAL_PROGRESSION: GRADUALLY WORSENING
CLINICAL_PROGRESSION: GRADUALLY WORSENING
CLINICAL_PROGRESSION: GRADUALLY IMPROVING

## 2022-03-11 ASSESSMENT — PAIN DESCRIPTION - ONSET
ONSET: GRADUAL

## 2022-03-11 ASSESSMENT — PAIN SCALES - GENERAL
PAINLEVEL_OUTOF10: 8
PAINLEVEL_OUTOF10: 8
PAINLEVEL_OUTOF10: 7
PAINLEVEL_OUTOF10: 6
PAINLEVEL_OUTOF10: 10
PAINLEVEL_OUTOF10: 6
PAINLEVEL_OUTOF10: 6
PAINLEVEL_OUTOF10: 5
PAINLEVEL_OUTOF10: 8

## 2022-03-11 ASSESSMENT — PAIN DESCRIPTION - FREQUENCY
FREQUENCY: CONTINUOUS
FREQUENCY: INTERMITTENT
FREQUENCY: CONTINUOUS

## 2022-03-11 ASSESSMENT — PAIN DESCRIPTION - ORIENTATION
ORIENTATION: LOWER

## 2022-03-11 ASSESSMENT — PAIN DESCRIPTION - PAIN TYPE
TYPE: ACUTE PAIN
TYPE: ACUTE PAIN
TYPE: ACUTE PAIN;CHRONIC PAIN
TYPE: ACUTE PAIN

## 2022-03-11 NOTE — CARE COORDINATION
AINSLEY sent perfect serve message to Dr Corina Suero regarding possibly changing patient to Inpatient, she is currently observation but going for a PLIF with Neurosurgery.

## 2022-03-11 NOTE — PROGRESS NOTES
Reviewed recommendations from cardiology. Discussed options. Risk, benefits discussed. Patient wishes to proceed with surgery, knowing increased risk factors.   Plan for today

## 2022-03-11 NOTE — FLOWSHEET NOTE
03/11/22 1030   Vital Signs   BP (!) 91/42   Temp 98.7 °F (37.1 °C)   Pulse 71   Weight 182 lb 15.7 oz (83 kg)   Weight Method Bed scale   Percent Weight Change -0.48   Post-Hemodialysis Assessment   Post-Treatment Procedures Blood returned; Access bleeding time < 10 minutes   Machine Disinfection Process Exterior Machine Disinfection   Rinseback Volume (ml) 300 ml   Total Liters Processed (l/min) 58.4 l/min   Dialyzer Clearance Lightly streaked   Duration of Treatment (minutes) 210 minutes   Heparin amount administered during treatment (units) 0 units   Hemodialysis Intake (ml) 300 ml   Hemodialysis Output (ml) 805 ml   NET Removed (ml) 505 ml   Tolerated Treatment Good   Patient Response to Treatment tolerated well, blood returned, needles pulled, sites held, stasis acheived, bandaids applied, +thrill, +bruit

## 2022-03-11 NOTE — CARE COORDINATION
Patient was a transfer from another unit. Attempted to meet with patient but she is currently out of room at dialysis. She goes to [facility]. Per notes likely will have surgery with Dr Cookie Maher, PLIF L2-L3 and removal of spinal Chord stimulator. Patient lives alone in a 1 story condo, she will need therapy to eval once she is done with surgery to better understand what her needs will be but she will likely need rehab. She has a history of [facility]. Will follow.

## 2022-03-11 NOTE — PROGRESS NOTES
[facility]  Nephrology Consult Note  Patient's Name: Pascale Garcia    Reason for Consult: ESRD on HD MWF    History Obtained From:  patient, past medical records and EMR    History of Present Illness:    Pascale Garcia is a 78 y.o. female with a past medical history of breast CA, anemia, hypertension, hyperlipidemia, and glaucoma. She presented to [facility] ED on 3/8 following a fall. Per the ED provider note the patient had back pain/had fell. She was dialyzed on 3/9 at [facility]. Patient was transferred to [facility], as she can see neurosurgery here. Lab data today includes potassium 4.7, BUN 26, creatinine 4.7, and hemoglobin 11.7. We were consulted to see the patient for ESRD on HD MWF. Patient is known to our service and dialyzes at [address] MWF via a left AV fistula. At present, patient was seen and examined. She has family at the bedside. She reports that she had a fall after feeling weakness and pain in her back, on Tuesday morning. She explained that she was unable to ambulate at that time. She denies any current chest pain or shortness of breath. No abdominal pain, nausea, or vomiting. She denied any dizziness.     Subjective    3/11: Seen during hemodialysis; back pain persists    Allergies:  Furosemide    Current Medications:    sodium chloride flush 0.9 % injection 5-40 mL, 2 times per day  sodium chloride flush 0.9 % injection 5-40 mL, PRN  0.9 % sodium chloride infusion, PRN  ondansetron (ZOFRAN-ODT) disintegrating tablet 4 mg, Q8H PRN   Or  ondansetron (ZOFRAN) injection 4 mg, Q6H PRN  polyethylene glycol (GLYCOLAX) packet 17 g, Daily PRN  acetaminophen (TYLENOL) tablet 650 mg, Q6H PRN   Or  acetaminophen (TYLENOL) suppository 650 mg, Q6H PRN  HYDROcodone-acetaminophen (NORCO) 5-325 MG per tablet 1 tablet, Q6H PRN  insulin lispro (HUMALOG) injection vial 0-6 Units, TID WC  insulin lispro (HUMALOG) injection vial 0-3 Units, Nightly  allopurinol (ZYLOPRIM) tablet 100 mg, Daily  atorvastatin (LIPITOR) tablet 80 mg, Nightly  gabapentin (NEURONTIN) capsule 100 mg, TID  hydrALAZINE (APRESOLINE) tablet 10 mg, BID  isosorbide mononitrate (IMDUR) extended release tablet 30 mg, Daily  lanthanum (FOSRENOL) chewable tablet 1,000 mg, TID WC  metoprolol succinate (TOPROL XL) extended release tablet 25 mg, Daily  midodrine (PROAMATINE) tablet 5 mg, PRN  pantoprazole (PROTONIX) tablet 40 mg, QAM AC  traMADol (ULTRAM) tablet 50 mg, Q6H PRN      No current facility-administered medications on file prior to encounter. Current Outpatient Medications on File Prior to Encounter   Medication Sig Dispense Refill    atorvastatin (LIPITOR) 80 MG tablet Take 80 mg by mouth nightly      bumetanide (BUMEX) 2 MG tablet Take 2 mg by mouth three times a week *SUNTUETHUR*      isosorbide mononitrate (IMDUR) 30 MG extended release tablet Take 30 mg by mouth daily      insulin glargine (LANTUS) 100 UNIT/ML injection vial Inject 11 Units into the skin nightly      hydrALAZINE (APRESOLINE) 10 MG tablet Take 1 tablet by mouth 2 times daily 180 tablet 1    metoprolol succinate (TOPROL XL) 25 MG extended release tablet Take 1 tablet by mouth daily 90 tablet 1    gabapentin (NEURONTIN) 100 MG capsule Take 1 capsule by mouth 3 times daily for 180 days.  Intended supply: 30 days 90 capsule 5    lanthanum (FOSRENOL) 1000 MG chewable tablet Take 1,000 mg by mouth 3 times daily (with meals)       allopurinol (ZYLOPRIM) 100 MG tablet Take 1 tablet by mouth daily 90 tablet 1    ticagrelor (BRILINTA) 90 MG TABS tablet Take 1 tablet by mouth 2 times daily 180 tablet 1    aspirin 81 MG EC tablet Take 1 tablet by mouth daily 30 tablet 3    B Complex-C-Folic Acid (BONI CAPS) 1 MG CAPS Take 1 mg by mouth nightly       LUMIGAN 0.01 % SOLN ophthalmic drops Place 1 drop into the right eye nightly       COMBIGAN 0.2-0.5 % ophthalmic solution Place 1 drop into the right eye every 12 hours   7    lidocaine-prilocaine (EMLA) 2.5-2.5 % cream Apply topically See Admin Instructions APPLY SMALL AMOUNT TO ACCESS SITE (AVF) 30-45 MINUTES BEFORE DIALYSIS. COVER WITH OCCLUSIVE DRESSING ( SARAN WRAP)      midodrine (PROAMATINE) 5 MG tablet Take 1 tablet by mouth as needed (may repeat x1 durring HD for assymptomatic SBP<100) 90 tablet 0    omeprazole (PRILOSEC) 20 MG delayed release capsule Take 20 mg by mouth daily as needed (GI DISCOMFORT)         Review of Systems:   Pertinent items are noted in HPI. Physical exam:  Vitals:    03/11/22 1001   BP: (!) 97/42   Pulse: 70   Resp:    Temp:    SpO2:      Wt Readings from Last 3 Encounters:   03/11/22 183 lb 13.8 oz (83.4 kg)   03/09/22 184 lb 1.4 oz (83.5 kg)   01/20/22 181 lb (82.1 kg)     Temp Readings from Last 3 Encounters:   03/11/22 98.8 °F (37.1 °C)   03/09/22 98.2 °F (36.8 °C)   02/28/22 98.1 °F (36.7 °C)     BP Readings from Last 3 Encounters:   03/11/22 (!) 97/42   03/09/22 102/60   01/20/22 (!) 165/74     Pulse Readings from Last 3 Encounters:   03/11/22 70   03/09/22 74   01/20/22 62       General: Awake, alert, in distress due to back eleno  Neck: No JVD noted  Lungs: Clear bilaterally, no adventitious lung sounds, unlabored  CV: Regular rate and rhythm. No rub  Abd: Soft, nontender, nondistended. Active bowel sounds. Skin: Warm and dry. No rash on exposed extremities  Ext: No edema; LUE AV fistula  Neuro: Awake, answers questions appropriately    I/O last 3 completed shifts: In: 290 [P.O.:290]  Out: -   No intake/output data recorded.     Data:   Labs:  Lab Results   Component Value Date     03/11/2022     03/10/2022     03/08/2022    K 5.1 (H) 03/11/2022    K 4.7 03/10/2022    K 4.7 03/08/2022    CL 97 (L) 03/11/2022    CO2 27 03/11/2022    CO2 29 03/10/2022    CO2 30 (H) 03/08/2022    CREATININE 6.2 (H) 03/11/2022    CREATININE 4.7 (H) 03/10/2022    CREATININE 4.7 (H) 03/08/2022    BUN 38 (H) 03/11/2022 BUN 26 (H) 03/10/2022    BUN 27 (H) 03/08/2022    GLUCOSE 100 (H) 03/11/2022    GLUCOSE 100 (H) 03/10/2022    GLUCOSE 146 (H) 03/08/2022    PHOS 5.8 (H) 03/11/2022    PHOS 3.1 05/25/2021    PHOS 4.1 05/24/2021    WBC 6.9 03/11/2022    WBC 6.9 03/10/2022    WBC 7.5 03/08/2022    HGB 10.8 (L) 03/11/2022    HGB 11.7 03/10/2022    HGB 11.5 03/08/2022    HCT 35.0 03/11/2022    HCT 37.8 03/10/2022    HCT 37.5 03/08/2022    MCV 93.3 03/11/2022     03/11/2022     Lab Results   Component Value Date     03/11/2022    K 5.1 03/11/2022    CL 97 03/11/2022    CO2 27 03/11/2022    BUN 38 03/11/2022    CREATININE 6.2 03/11/2022    GLUCOSE 100 03/11/2022    GLUCOSE 46 04/02/2012    CALCIUM 9.4 03/11/2022       Recent Labs     03/11/22  0657 03/10/22  0539 03/08/22  1815   WBC 6.9 6.9 7.5   HGB 10.8* 11.7 11.5   HCT 35.0 37.8 37.5   MCV 93.3 92.6 94.9    152 181      Lab Results   Component Value Date    COLORU Yellow 11/07/2017    NITRU Negative 11/07/2017    GLUCOSEU Negative 11/07/2017    GLUCOSEU NEGATIVE 08/27/2011    KETUA Negative 11/07/2017    UROBILINOGEN 0.2 11/07/2017    BILIRUBINUR Negative 11/07/2017    BILIRUBINUR NEGATIVE 08/27/2011      Lab Results   Component Value Date    CALCIUM 9.4 03/11/2022    PHOS 5.8 (H) 03/11/2022      Lab Results   Component Value Date    IRON 33 (L) 11/09/2017    TIBC 222 (L) 11/09/2017    FERRITIN 334 11/09/2017     Imaging:  No results found. No results found for this or any previous visit. Assessment/ Plans:    1. ESRD on HD  OP [facility] MWF   left AV fistula  S/p dialysis 3/9 at [facility]  Continue HD MWF    2.  Back pain persists  S/p fall 3/8  CT lumbar spine 3/8 negative for fracture, severe central canal stenosis  for OR today  Seen by cardiology team high risk for surgery  Neurosurgery following    3. Hypertension with ESRD  BP goal< 140/90  BP at goal  Follow on current regimen/with HD    4.   Secondary hyperparathyroidism of renal origin  Last PTH from 2/16/2022 in the outpatient setting was 293  Will check PTH, Phos, vitamin D in the am   Follow    Thank you for allowing us to participate in the care of Madeleine Murillo MD     Department of Internal Medicine  Section of Nephrology  Dialysis Note        PROCEDURE:  Patient seen on hemodialysis at 10:52 AM    PHYSICIAN:  Mike Aiken M.D., [facility]    INDICATION:  End-stage renal disease    RX:  See dialysis flowsheet for specifics on access, blood flow rate, dialysate baths, duration of dialysis, anticoagulation and other technical information.     COMMENTS: Procedure in progress and tolerated        Jennifer Cooper MD, MD

## 2022-03-11 NOTE — PROGRESS NOTES
Dr Lacey Plummer here. Surgery cancelled. Pt requesting nerve block d/t risks from undergoing anesthesia   Floor updated.  Transport requested

## 2022-03-11 NOTE — PLAN OF CARE
Problem: Falls - Risk of:  Goal: Will remain free from falls  Description: Will remain free from falls  Outcome: Met This Shift     Problem: Falls - Risk of:  Goal: Absence of physical injury  Description: Absence of physical injury  Outcome: Met This Shift     Problem: Pain - Acute:  Goal: Pain level will decrease  Description: Pain level will decrease  Outcome: Met This Shift

## 2022-03-11 NOTE — PROGRESS NOTES
Spoke with Junie Garza with cardiothoracic surgery via telephone regarding her request to place a consult for them to see pt while she was here.  Lo Jordan stated it was to add Dr. Dana Mayorga to the treatment team

## 2022-03-11 NOTE — PROGRESS NOTES
Hospitalist Progress Note      SYNOPSIS: Patient admitted on 3/9/2022 for low back pain. This is a 69-year-old black female who was transferred from [facility] because of back pain. Symptoms started few days ago when she took a step and fell on the ground. She denies lower extremity weakness. She denies chest pain, no hematuria dysuria hematemesis or hematochezia. She denies subjective fever or chills. Patient has a history of discectomy and a spinal cord stimulator placed for pain by Dr. Aimee Aguirre. At the time of examination patient continues to complain of back pain without weakness. SUBJECTIVE:    Patient seen and examined in her room. Continues to complain of back pain. Denies any chest pain, nausea, vomiting, shortness of breath. Records reviewed. Stable overnight. No other overnight issues reported. Temp (24hrs), Av.5 °F (36.9 °C), Min:98.1 °F (36.7 °C), Max:98.8 °F (37.1 °C)    DIET: Diet NPO  CODE: Full Code    Intake/Output Summary (Last 24 hours) at 3/11/2022 0823  Last data filed at 3/10/2022 1853  Gross per 24 hour   Intake 240 ml   Output --   Net 240 ml       OBJECTIVE:    BP (!) 109/23   Pulse 69   Temp 98.8 °F (37.1 °C)   Resp 18   Ht 5' 10\" (1.778 m)   Wt 183 lb 13.8 oz (83.4 kg)   SpO2 92%   BMI 26.38 kg/m²     General appearance: No apparent distress, appears stated age and cooperative. HEENT:  Conjunctivae/corneas clear. Neck: Supple. No jugular venous distention. Respiratory: Clear to auscultation bilaterally, normal respiratory effort  Cardiovascular: Regular rate rhythm, normal S1-S2  Abdomen: Soft, nontender, nondistended  Musculoskeletal: No clubbing, cyanosis, no bilateral lower extremity edema. Brisk capillary refill. Skin:  No rashes  on visible skin  Neurologic: awake, alert and following commands     ASSESSMENT & PLAN:      1.   Back pain with spinal stenosis: Cervical and lumbar spine CT consistent with  Severe central canal stenosis at L2-3.  Moderate stenosis at L1-2.   5.  Multilevel neural foraminal stenoses, worst (severe) at L2-3.  Moderate   to severe stenoses at L1-2.    Pain control as indicated. Neurosurgery consultation  Plan for decompression surgery  Preop clearance by cardiology     2. End-stage renal disease on hemodialysis: Monday Wednesday Friday, consult nephrology for renal replacement therapy. Received dialysis at [facility] on 3/9/2022, next dialysis as per nephrology     3. Chronic anemia secondary to end-stage renal disease: Monitor H&H, continue JAYSHREE.     4.  Essential hypertension: Continue outpatient medications with hydralazine and metoprolol.     5. Hyperlipidemia: Continue statin     6.   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  Gout without acute attack: Continue allopurinol           Diet: Diet NPO  Code Status: Full Code        DISPOSITION:   Unclear discharge disposition at the moment    Medications:  REVIEWED DAILY    Infusion Medications    sodium chloride       Scheduled Medications    sodium chloride flush  5-40 mL IntraVENous 2 times per day    insulin lispro  0-6 Units SubCUTAneous TID WC    insulin lispro  0-3 Units SubCUTAneous Nightly    allopurinol  100 mg Oral Daily    atorvastatin  80 mg Oral Nightly    gabapentin  100 mg Oral TID    hydrALAZINE  10 mg Oral BID    isosorbide mononitrate  30 mg Oral Daily    lanthanum  1,000 mg Oral TID WC    metoprolol succinate  25 mg Oral Daily    pantoprazole  40 mg Oral QAM AC     PRN Meds: sodium chloride flush, sodium chloride, ondansetron **OR** ondansetron, polyethylene glycol, acetaminophen **OR** acetaminophen, HYDROcodone 5 mg - acetaminophen, midodrine, traMADol    Labs:     Recent Labs     03/08/22  1815 03/10/22  0539 03/11/22  0657   WBC 7.5 6.9 6.9   HGB 11.5 11.7 10.8*   HCT 37.5 37.8 35.0    152 189       Recent Labs     03/08/22  1815 03/10/22  0539    141   K 4.7 4.7 CL 97* 99   CO2 30* 29   BUN 27* 26*   CREATININE 4.7* 4.7*   CALCIUM 9.4 9.8       Recent Labs     03/10/22  0539   PROT 6.8   ALKPHOS 82   ALT 17   AST 21   BILITOT 0.8       No results for input(s): INR in the last 72 hours. No results for input(s): Oumar Booker in the last 72 hours. Chronic labs:    Lab Results   Component Value Date    CHOL 101 05/19/2021    TRIG 106 05/19/2021    HDL 38 05/19/2021    LDLCALC 42 05/19/2021    TSH 2.810 05/19/2021    INR 1.0 01/20/2022    LABA1C 5.7 09/07/2021       Radiology: REVIEWED DAILY    +++++++++++++++++++++++++++++++++++++++++++++++++  Juan Jiang MD  [facility] Physician - [address], [place]  +++++++++++++++++++++++++++++++++++++++++++++++++  NOTE: This report was transcribed using voice recognition software. Every effort was made to ensure accuracy; however, inadvertent computerized transcription errors may be present.

## 2022-03-11 NOTE — PROGRESS NOTES
Nurse to nurse report called to [address] [place] [address] 21 on [facility] 82 extension

## 2022-03-11 NOTE — PROGRESS NOTES
[facility] EMG leads placed at patient bedside. Case was subsequently cancelled and EMG leads removed.      Electronically signed by Rikki Werner on 3/11/2022 at 1:18 PM

## 2022-03-11 NOTE — PROGRESS NOTES
Consult placed to me for \"clearance for surgery. \"  That is not a service we provide or offer. Thank you.   Drea Salguero MD

## 2022-03-12 ENCOUNTER — APPOINTMENT (OUTPATIENT)
Dept: GENERAL RADIOLOGY | Age: 67
DRG: 551 | End: 2022-03-12
Attending: INTERNAL MEDICINE
Payer: COMMERCIAL

## 2022-03-12 LAB
ALBUMIN SERPL-MCNC: 3.3 G/DL (ref 3.5–5.2)
ALP BLD-CCNC: 69 U/L (ref 35–104)
ALT SERPL-CCNC: 16 U/L (ref 0–32)
ANION GAP SERPL CALCULATED.3IONS-SCNC: 14 MMOL/L (ref 7–16)
AST SERPL-CCNC: 22 U/L (ref 0–31)
BASOPHILS ABSOLUTE: 0.03 E9/L (ref 0–0.2)
BASOPHILS RELATIVE PERCENT: 0.5 % (ref 0–2)
BILIRUB SERPL-MCNC: 0.7 MG/DL (ref 0–1.2)
BUN BLDV-MCNC: 28 MG/DL (ref 6–23)
CALCIUM SERPL-MCNC: 9.3 MG/DL (ref 8.6–10.2)
CHLORIDE BLD-SCNC: 95 MMOL/L (ref 98–107)
CO2: 27 MMOL/L (ref 22–29)
CREAT SERPL-MCNC: 4.7 MG/DL (ref 0.5–1)
EOSINOPHILS ABSOLUTE: 0.1 E9/L (ref 0.05–0.5)
EOSINOPHILS RELATIVE PERCENT: 1.5 % (ref 0–6)
GFR AFRICAN AMERICAN: 11
GFR NON-AFRICAN AMERICAN: 11 ML/MIN/1.73
GLUCOSE BLD-MCNC: 119 MG/DL (ref 74–99)
HCT VFR BLD CALC: 35 % (ref 34–48)
HEMOGLOBIN: 10.7 G/DL (ref 11.5–15.5)
IMMATURE GRANULOCYTES #: 0.02 E9/L
IMMATURE GRANULOCYTES %: 0.3 % (ref 0–5)
LYMPHOCYTES ABSOLUTE: 0.78 E9/L (ref 1.5–4)
LYMPHOCYTES RELATIVE PERCENT: 11.9 % (ref 20–42)
MCH RBC QN AUTO: 28.5 PG (ref 26–35)
MCHC RBC AUTO-ENTMCNC: 30.6 % (ref 32–34.5)
MCV RBC AUTO: 93.1 FL (ref 80–99.9)
METER GLUCOSE: 118 MG/DL (ref 74–99)
METER GLUCOSE: 120 MG/DL (ref 74–99)
METER GLUCOSE: 194 MG/DL (ref 74–99)
METER GLUCOSE: 197 MG/DL (ref 74–99)
MONOCYTES ABSOLUTE: 1.01 E9/L (ref 0.1–0.95)
MONOCYTES RELATIVE PERCENT: 15.5 % (ref 2–12)
NEUTROPHILS ABSOLUTE: 4.59 E9/L (ref 1.8–7.3)
NEUTROPHILS RELATIVE PERCENT: 70.3 % (ref 43–80)
PDW BLD-RTO: 16.4 FL (ref 11.5–15)
PLATELET # BLD: 190 E9/L (ref 130–450)
PMV BLD AUTO: 11 FL (ref 7–12)
POTASSIUM REFLEX MAGNESIUM: 4.8 MMOL/L (ref 3.5–5)
POTASSIUM SERPL-SCNC: 4.8 MMOL/L (ref 3.5–5)
RBC # BLD: 3.76 E12/L (ref 3.5–5.5)
SODIUM BLD-SCNC: 136 MMOL/L (ref 132–146)
TOTAL PROTEIN: 6.4 G/DL (ref 6.4–8.3)
WBC # BLD: 6.5 E9/L (ref 4.5–11.5)

## 2022-03-12 PROCEDURE — 82962 GLUCOSE BLOOD TEST: CPT

## 2022-03-12 PROCEDURE — 3E0R3BZ INTRODUCTION OF ANESTHETIC AGENT INTO SPINAL CANAL, PERCUTANEOUS APPROACH: ICD-10-PCS | Performed by: NEUROLOGICAL SURGERY

## 2022-03-12 PROCEDURE — 1200000000 HC SEMI PRIVATE

## 2022-03-12 PROCEDURE — 6370000000 HC RX 637 (ALT 250 FOR IP)

## 2022-03-12 PROCEDURE — G0378 HOSPITAL OBSERVATION PER HR: HCPCS

## 2022-03-12 PROCEDURE — 80053 COMPREHEN METABOLIC PANEL: CPT

## 2022-03-12 PROCEDURE — 3E0R33Z INTRODUCTION OF ANTI-INFLAMMATORY INTO SPINAL CANAL, PERCUTANEOUS APPROACH: ICD-10-PCS | Performed by: NEUROLOGICAL SURGERY

## 2022-03-12 PROCEDURE — 80048 BASIC METABOLIC PNL TOTAL CA: CPT

## 2022-03-12 PROCEDURE — 85025 COMPLETE CBC W/AUTO DIFF WBC: CPT

## 2022-03-12 PROCEDURE — 6370000000 HC RX 637 (ALT 250 FOR IP): Performed by: INTERNAL MEDICINE

## 2022-03-12 PROCEDURE — 36415 COLL VENOUS BLD VENIPUNCTURE: CPT

## 2022-03-12 PROCEDURE — 2060000000 HC ICU INTERMEDIATE R&B

## 2022-03-12 PROCEDURE — 64483 NJX AA&/STRD TFRM EPI L/S 1: CPT

## 2022-03-12 PROCEDURE — 77002 NEEDLE LOCALIZATION BY XRAY: CPT

## 2022-03-12 PROCEDURE — 2580000003 HC RX 258: Performed by: INTERNAL MEDICINE

## 2022-03-12 RX ORDER — MORPHINE SULFATE 1 MG/ML
4 INJECTION, SOLUTION EPIDURAL; INTRATHECAL; INTRAVENOUS ONCE
Status: COMPLETED | OUTPATIENT
Start: 2022-03-12 | End: 2022-03-13

## 2022-03-12 RX ORDER — VITAMIN B COMPLEX
1000 TABLET ORAL DAILY
Status: DISCONTINUED | OUTPATIENT
Start: 2022-03-12 | End: 2022-03-17 | Stop reason: HOSPADM

## 2022-03-12 RX ADMIN — GABAPENTIN 100 MG: 100 CAPSULE ORAL at 20:50

## 2022-03-12 RX ADMIN — METOPROLOL SUCCINATE 25 MG: 25 TABLET, EXTENDED RELEASE ORAL at 10:59

## 2022-03-12 RX ADMIN — TRAMADOL HYDROCHLORIDE 50 MG: 50 TABLET, COATED ORAL at 10:59

## 2022-03-12 RX ADMIN — LANTHANUM CARBONATE 1000 MG: 500 TABLET, CHEWABLE ORAL at 17:35

## 2022-03-12 RX ADMIN — Medication 1000 UNITS: at 10:59

## 2022-03-12 RX ADMIN — INSULIN LISPRO 1 UNITS: 100 INJECTION, SOLUTION INTRAVENOUS; SUBCUTANEOUS at 17:34

## 2022-03-12 RX ADMIN — INSULIN LISPRO 1 UNITS: 100 INJECTION, SOLUTION INTRAVENOUS; SUBCUTANEOUS at 20:50

## 2022-03-12 RX ADMIN — Medication 10 ML: at 21:43

## 2022-03-12 RX ADMIN — ALLOPURINOL 100 MG: 100 TABLET ORAL at 10:59

## 2022-03-12 RX ADMIN — GABAPENTIN 100 MG: 100 CAPSULE ORAL at 14:12

## 2022-03-12 RX ADMIN — GABAPENTIN 100 MG: 100 CAPSULE ORAL at 10:59

## 2022-03-12 RX ADMIN — ATORVASTATIN CALCIUM 80 MG: 40 TABLET, FILM COATED ORAL at 20:50

## 2022-03-12 RX ADMIN — TRAMADOL HYDROCHLORIDE 50 MG: 50 TABLET, COATED ORAL at 20:54

## 2022-03-12 ASSESSMENT — PAIN SCALES - GENERAL
PAINLEVEL_OUTOF10: 5
PAINLEVEL_OUTOF10: 8
PAINLEVEL_OUTOF10: 5
PAINLEVEL_OUTOF10: 9

## 2022-03-12 NOTE — PROGRESS NOTES
[facility]  Nephrology Progress Note  Patient's Name: Chuy Hopkins    History of Present Illness from 3/10 Consult Note:  \"Claudia Sanchez is a 78 y.o. female with a past medical history of breast CA, anemia, hypertension, hyperlipidemia, and glaucoma. She presented to [facility] ED on 3/8 following a fall. Per the ED provider note the patient had back pain/had fell. She was dialyzed on 3/9 at [facility]. Patient was transferred to [facility], as she can see neurosurgery here. Lab data today includes potassium 4.7, BUN 26, creatinine 4.7, and hemoglobin 11.7. We were consulted to see the patient for ESRD on HD MWF. Patient is known to our service and dialyzes at [address] MWF via a left AV fistula. At present, patient was seen and examined. She has family at the bedside. She reports that she had a fall after feeling weakness and pain in her back, on Tuesday morning. She explained that she was unable to ambulate at that time. She denies any current chest pain or shortness of breath. No abdominal pain, nausea, or vomiting. She denied any dizziness. \"    Subjective    3/12: Patient was seen and examined. She reports that she feels \"pretty good. \" She had returned from her nerve block and explained that it had provided some relief. She has family at the bedside. She denied any chest pain or shortness of breath. No abdominal pain or nausea. She is tolerating her food well.       Allergies:  Furosemide    Current Medications:    sodium chloride flush 0.9 % injection 5-40 mL, 2 times per day  sodium chloride flush 0.9 % injection 5-40 mL, PRN  0.9 % sodium chloride infusion, PRN  ondansetron (ZOFRAN-ODT) disintegrating tablet 4 mg, Q8H PRN   Or  ondansetron (ZOFRAN) injection 4 mg, Q6H PRN  polyethylene glycol (GLYCOLAX) packet 17 g, Daily PRN  acetaminophen (TYLENOL) tablet 650 mg, Q6H PRN   Or  acetaminophen (TYLENOL) suppository 650 mg, Q6H PRN  HYDROcodone-acetaminophen (NORCO) 5-325 MG per tablet 1 tablet, Q6H PRN  insulin lispro (HUMALOG) injection vial 0-6 Units, TID WC  insulin lispro (HUMALOG) injection vial 0-3 Units, Nightly  allopurinol (ZYLOPRIM) tablet 100 mg, Daily  atorvastatin (LIPITOR) tablet 80 mg, Nightly  gabapentin (NEURONTIN) capsule 100 mg, TID  hydrALAZINE (APRESOLINE) tablet 10 mg, BID  isosorbide mononitrate (IMDUR) extended release tablet 30 mg, Daily  lanthanum (FOSRENOL) chewable tablet 1,000 mg, TID WC  metoprolol succinate (TOPROL XL) extended release tablet 25 mg, Daily  midodrine (PROAMATINE) tablet 5 mg, PRN  pantoprazole (PROTONIX) tablet 40 mg, QAM AC  traMADol (ULTRAM) tablet 50 mg, Q6H PRN      No current facility-administered medications on file prior to encounter. Current Outpatient Medications on File Prior to Encounter   Medication Sig Dispense Refill    atorvastatin (LIPITOR) 80 MG tablet Take 80 mg by mouth nightly      bumetanide (BUMEX) 2 MG tablet Take 2 mg by mouth three times a week *SUN-TUE-THUR*      isosorbide mononitrate (IMDUR) 30 MG extended release tablet Take 30 mg by mouth daily      insulin glargine (LANTUS) 100 UNIT/ML injection vial Inject 11 Units into the skin nightly      hydrALAZINE (APRESOLINE) 10 MG tablet Take 1 tablet by mouth 2 times daily 180 tablet 1    metoprolol succinate (TOPROL XL) 25 MG extended release tablet Take 1 tablet by mouth daily 90 tablet 1    gabapentin (NEURONTIN) 100 MG capsule Take 1 capsule by mouth 3 times daily for 180 days.  Intended supply: 30 days 90 capsule 5    lanthanum (FOSRENOL) 1000 MG chewable tablet Take 1,000 mg by mouth 3 times daily (with meals)       allopurinol (ZYLOPRIM) 100 MG tablet Take 1 tablet by mouth daily 90 tablet 1    ticagrelor (BRILINTA) 90 MG TABS tablet Take 1 tablet by mouth 2 times daily 180 tablet 1    aspirin 81 MG EC tablet Take 1 tablet by mouth daily 30 tablet 3    B Complex-C-Folic Acid (BONI CAPS) 1 MG CAPS Take 1 mg by mouth nightly       LUMIGAN 0.01 % SOLN ophthalmic drops Place 1 drop into the right eye nightly       COMBIGAN 0.2-0.5 % ophthalmic solution Place 1 drop into the right eye every 12 hours   7    lidocaine-prilocaine (EMLA) 2.5-2.5 % cream Apply topically See Admin Instructions APPLY SMALL AMOUNT TO ACCESS SITE (AVF) 30-45 MINUTES BEFORE DIALYSIS. COVER WITH OCCLUSIVE DRESSING ( SARAN WRAP)      midodrine (PROAMATINE) 5 MG tablet Take 1 tablet by mouth as needed (may repeat x1 durring HD for assymptomatic SBP<100) 90 tablet 0    omeprazole (PRILOSEC) 20 MG delayed release capsule Take 20 mg by mouth daily as needed (GI DISCOMFORT)         Physical exam:  Vitals:    03/12/22 0215   BP: (!) 112/44   Pulse: 73   Resp: 18   Temp: 97.1 °F (36.2 °C)   SpO2: 96%     Wt Readings from Last 3 Encounters:   03/11/22 182 lb 15.7 oz (83 kg)   03/09/22 184 lb 1.4 oz (83.5 kg)   01/20/22 181 lb (82.1 kg)     Temp Readings from Last 3 Encounters:   03/12/22 97.1 °F (36.2 °C) (Temporal)   03/09/22 98.2 °F (36.8 °C)   02/28/22 98.1 °F (36.7 °C)     BP Readings from Last 3 Encounters:   03/12/22 (!) 112/44   03/09/22 102/60   01/20/22 (!) 165/74     Pulse Readings from Last 3 Encounters:   03/12/22 73   03/09/22 74   01/20/22 62       General: Awake, alert, in distress   Neck: No JVD noted  Lungs: Clear bilaterally, no adventitious lung sounds, unlabored  CV: Regular rate and rhythm. No rub  Abd: Soft, nontender, nondistended. Active bowel sounds. Skin: Warm and dry. No rash on exposed extremities  Ext: RUE edema patient reports history of breast CA; LUE AV fistula  Neuro: Awake, answers questions appropriately    I/O last 3 completed shifts: In: 360 [P.O.:60]  Out: 805   No intake/output data recorded.     Data:   Labs:  Lab Results   Component Value Date     03/12/2022     03/11/2022     03/10/2022    K 4.8 03/12/2022    K 4.8 03/12/2022    K 5.1 (H) 03/11/2022    K 5.1 (H) 03/11/2022    CL 95 (L) 03/12/2022    CO2 27 03/12/2022    CO2 27 03/11/2022    CO2 29 03/10/2022    CREATININE 4.7 (H) 03/12/2022    CREATININE 6.2 (H) 03/11/2022    CREATININE 4.7 (H) 03/10/2022    BUN 28 (H) 03/12/2022    BUN 38 (H) 03/11/2022    BUN 26 (H) 03/10/2022    GLUCOSE 119 (H) 03/12/2022    GLUCOSE 100 (H) 03/11/2022    GLUCOSE 100 (H) 03/10/2022    PHOS 5.8 (H) 03/11/2022    PHOS 3.1 05/25/2021    PHOS 4.1 05/24/2021    WBC 6.5 03/12/2022    WBC 6.9 03/11/2022    WBC 6.9 03/10/2022    HGB 10.7 (L) 03/12/2022    HGB 10.8 (L) 03/11/2022    HGB 11.7 03/10/2022    HCT 35.0 03/12/2022    HCT 35.0 03/11/2022    HCT 37.8 03/10/2022    MCV 93.1 03/12/2022     03/12/2022     Lab Results   Component Value Date     03/12/2022    K 4.8 03/12/2022    K 4.8 03/12/2022    CL 95 03/12/2022    CO2 27 03/12/2022    BUN 28 03/12/2022    CREATININE 4.7 03/12/2022    GLUCOSE 119 03/12/2022    GLUCOSE 46 04/02/2012    CALCIUM 9.3 03/12/2022       Recent Labs     03/12/22  0556 03/11/22  0657 03/10/22  0539   WBC 6.5 6.9 6.9   HGB 10.7* 10.8* 11.7   HCT 35.0 35.0 37.8   MCV 93.1 93.3 92.6    189 152      Lab Results   Component Value Date    COLORU Yellow 11/07/2017    NITRU Negative 11/07/2017    GLUCOSEU Negative 11/07/2017    GLUCOSEU NEGATIVE 08/27/2011    KETUA Negative 11/07/2017    UROBILINOGEN 0.2 11/07/2017    BILIRUBINUR Negative 11/07/2017    BILIRUBINUR NEGATIVE 08/27/2011      Lab Results   Component Value Date    CALCIUM 9.3 03/12/2022    PHOS 5.8 (H) 03/11/2022      Lab Results   Component Value Date    IRON 33 (L) 11/09/2017    TIBC 222 (L) 11/09/2017    FERRITIN 334 11/09/2017     Imaging:  No results found. No results found for this or any previous visit. Assessment/ Plans:    1.  ESRD on HD  OP [facility] MWF   left AV fistula  S/p dialysis 3/9 at [facility]  Continue HD MWF    2.  Back pain persists  S/p fall 3/8  CT lumbar spine 3/8 negative for fracture, severe central canal stenosis  High risk for surgery per Cardiology   S/p epidural catheter insertion/ nerve block 3/12  Neurosurgery following    3. Hypertension with ESRD  BP goal< 140/90  BP at goal  Follow on current regimen/with HD    4. Secondary hyperparathyroidism of renal origin  Lab work from 3/11 showed , Phos 5.8, vitamin D 28  Continue phos binder   Low phos diet when eating   Supplement vit d   Follow    Sukhi Hussein, APRN - CNP     Patient seen and examined all key components of the physical performed independently , case discussed with NP, all pertinent labs and radiologic tests personally reviewed agree with above.       Sampson Baldwin MD

## 2022-03-12 NOTE — PROGRESS NOTES
I have discussed the various options available and have recommended a three day Lumbar epidural nerve block for the relief of her symptoms. The risks, results and complications of the procedure were explained to the patient. She understands and is willing to proceed. This is scheduled for today. Temporary Lumbar epidural catheter inserted using fluoroscopy. Pt. Tolerated well. 80mg depomedrol and 1ml 0.25 percent marcaine given.

## 2022-03-12 NOTE — PROGRESS NOTES
The patient decided not to have surgery after discussing with Anesthesia. Decided in favor on epidural nerve block. Will plan for tomorrow.

## 2022-03-12 NOTE — PROGRESS NOTES
Hospitalist Progress Note      SYNOPSIS: Patient admitted on 3/9/2022 for low back pain. This is a 55-year-old black female who was transferred from [facility] because of back pain. Symptoms started few days ago when she took a step and fell on the ground. She denies lower extremity weakness. She denies chest pain, no hematuria dysuria hematemesis or hematochezia. She denies subjective fever or chills. Patient has a history of discectomy and a spinal cord stimulator placed for pain by Dr. Jose Bryant. At the time of examination patient continues to complain of back pain without weakness. After prolonged discussion between family and neurosurgery, patient decided to have epidural nerve block. SUBJECTIVE:    Patient seen and examined in her room. Continues to complain of back pain. Denies any chest pain, nausea, vomiting, shortness of breath. Records reviewed. Stable overnight. No other overnight issues reported. Temp (24hrs), Av.8 °F (36.6 °C), Min:97.1 °F (36.2 °C), Max:98.7 °F (37.1 °C)    DIET: ADULT DIET; Regular; Low Fat/Low Chol/High Fiber/2 gm Na; Low Potassium (Less than 3000 mg/day); Low Phosphorus (Less than 1000 mg)  CODE: Full Code    Intake/Output Summary (Last 24 hours) at 3/12/2022 0924  Last data filed at 3/11/2022 1500  Gross per 24 hour   Intake 360 ml   Output 805 ml   Net -445 ml       OBJECTIVE:    BP (!) 112/44   Pulse 73   Temp 97.1 °F (36.2 °C) (Temporal)   Resp 18   Ht 5' 10\" (1.778 m)   Wt 182 lb 15.7 oz (83 kg)   SpO2 96%   BMI 26.26 kg/m²     General appearance: No apparent distress, appears stated age and cooperative. HEENT:  Conjunctivae/corneas clear. Neck: Supple. No jugular venous distention. Respiratory: Clear to auscultation bilaterally, normal respiratory effort  Cardiovascular: Regular rate rhythm, normal S1-S2  Abdomen: Soft, nontender, nondistended  Musculoskeletal: No clubbing, cyanosis, no bilateral lower extremity edema. Brisk capillary refill. Skin:  No rashes  on visible skin  Neurologic: awake, alert and following commands     ASSESSMENT & PLAN:      1. Back pain with spinal stenosis: Cervical and lumbar spine CT consistent with  Severe central canal stenosis at L2-3.  Moderate stenosis at L1-2.   5.  Multilevel neural foraminal stenoses, worst (severe) at L2-3.  Moderate   to severe stenoses at L1-2.    Pain control as indicated. Neurosurgery consultation  Patient opted for epidural nerve block creatinine back surgery  Preop clearance by cardiology     2. End-stage renal disease on hemodialysis: Monday Wednesday Friday, consult nephrology for renal replacement therapy. Received dialysis at [facility] on 3/9/2022, next dialysis as per nephrology     3. Chronic anemia secondary to end-stage renal disease: Monitor H&H, continue JAYSHREE.     4.  Essential hypertension: Continue outpatient medications with hydralazine and metoprolol.     5. Hyperlipidemia: Continue statin     6.   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  Gout without acute attack: Continue allopurinol           Diet: Diet NPO  Code Status: Full Code        DISPOSITION:   Unclear discharge disposition at the moment    Medications:  REVIEWED DAILY    Infusion Medications    sodium chloride       Scheduled Medications    Vitamin D  1,000 Units Oral Daily    sodium chloride flush  5-40 mL IntraVENous 2 times per day    insulin lispro  0-6 Units SubCUTAneous TID WC    insulin lispro  0-3 Units SubCUTAneous Nightly    allopurinol  100 mg Oral Daily    atorvastatin  80 mg Oral Nightly    gabapentin  100 mg Oral TID    hydrALAZINE  10 mg Oral BID    isosorbide mononitrate  30 mg Oral Daily    lanthanum  1,000 mg Oral TID WC    metoprolol succinate  25 mg Oral Daily    pantoprazole  40 mg Oral QAM AC     PRN Meds: sodium chloride flush, sodium chloride, ondansetron **OR** ondansetron, polyethylene glycol, acetaminophen **OR** acetaminophen, HYDROcodone 5 mg - acetaminophen, midodrine, traMADol    Labs:     Recent Labs     03/10/22  0539 03/11/22  0657 03/12/22  0556   WBC 6.9 6.9 6.5   HGB 11.7 10.8* 10.7*   HCT 37.8 35.0 35.0    189 190       Recent Labs     03/10/22  0539 03/10/22  0539 03/11/22  0657 03/12/22  0556     --  137 136   K 4.7   < > 5.1*  5.1* 4.8  4.8   CL 99  --  97* 95*   CO2 29  --  27 27   BUN 26*  --  38* 28*   CREATININE 4.7*  --  6.2* 4.7*   CALCIUM 9.8  --  9.4 9.3   PHOS  --   --  5.8*  --     < > = values in this interval not displayed. Recent Labs     03/10/22  0539 03/11/22  0657 03/12/22  0556   PROT 6.8 6.5 6.4   ALKPHOS 82 77 69   ALT 17 18 16   AST 21 27 22   BILITOT 0.8 0.8 0.7       No results for input(s): INR in the last 72 hours. No results for input(s): Leatha Adlerman in the last 72 hours. Chronic labs:    Lab Results   Component Value Date    CHOL 101 05/19/2021    TRIG 106 05/19/2021    HDL 38 05/19/2021    LDLCALC 42 05/19/2021    TSH 2.810 05/19/2021    INR 1.0 01/20/2022    LABA1C 5.7 09/07/2021       Radiology: REVIEWED DAILY    +++++++++++++++++++++++++++++++++++++++++++++++++  Alhaji Lovett MD  [employer] - [address] [facility], [place]  +++++++++++++++++++++++++++++++++++++++++++++++++  NOTE: This report was transcribed using voice recognition software. Every effort was made to ensure accuracy; however, inadvertent computerized transcription errors may be present.

## 2022-03-13 LAB
ALBUMIN SERPL-MCNC: 3.1 G/DL (ref 3.5–5.2)
ALP BLD-CCNC: 68 U/L (ref 35–104)
ALT SERPL-CCNC: 18 U/L (ref 0–32)
ANION GAP SERPL CALCULATED.3IONS-SCNC: 15 MMOL/L (ref 7–16)
AST SERPL-CCNC: 21 U/L (ref 0–31)
BASOPHILS ABSOLUTE: 0.03 E9/L (ref 0–0.2)
BASOPHILS RELATIVE PERCENT: 0.4 % (ref 0–2)
BILIRUB SERPL-MCNC: 0.5 MG/DL (ref 0–1.2)
BUN BLDV-MCNC: 40 MG/DL (ref 6–23)
CALCIUM SERPL-MCNC: 9.7 MG/DL (ref 8.6–10.2)
CHLORIDE BLD-SCNC: 97 MMOL/L (ref 98–107)
CO2: 26 MMOL/L (ref 22–29)
CREAT SERPL-MCNC: 6.2 MG/DL (ref 0.5–1)
EOSINOPHILS ABSOLUTE: 0.06 E9/L (ref 0.05–0.5)
EOSINOPHILS RELATIVE PERCENT: 0.8 % (ref 0–6)
GFR AFRICAN AMERICAN: 8
GFR NON-AFRICAN AMERICAN: 8 ML/MIN/1.73
GLUCOSE BLD-MCNC: 116 MG/DL (ref 74–99)
HCT VFR BLD CALC: 34.8 % (ref 34–48)
HEMOGLOBIN: 10.8 G/DL (ref 11.5–15.5)
IMMATURE GRANULOCYTES #: 0.02 E9/L
IMMATURE GRANULOCYTES %: 0.3 % (ref 0–5)
LYMPHOCYTES ABSOLUTE: 1.07 E9/L (ref 1.5–4)
LYMPHOCYTES RELATIVE PERCENT: 14.5 % (ref 20–42)
MCH RBC QN AUTO: 28.7 PG (ref 26–35)
MCHC RBC AUTO-ENTMCNC: 31 % (ref 32–34.5)
MCV RBC AUTO: 92.6 FL (ref 80–99.9)
METER GLUCOSE: 109 MG/DL (ref 74–99)
METER GLUCOSE: 147 MG/DL (ref 74–99)
METER GLUCOSE: 156 MG/DL (ref 74–99)
METER GLUCOSE: 159 MG/DL (ref 74–99)
MONOCYTES ABSOLUTE: 1.09 E9/L (ref 0.1–0.95)
MONOCYTES RELATIVE PERCENT: 14.8 % (ref 2–12)
NEUTROPHILS ABSOLUTE: 5.1 E9/L (ref 1.8–7.3)
NEUTROPHILS RELATIVE PERCENT: 69.2 % (ref 43–80)
PDW BLD-RTO: 16.3 FL (ref 11.5–15)
PLATELET # BLD: 204 E9/L (ref 130–450)
PMV BLD AUTO: 10.9 FL (ref 7–12)
POTASSIUM REFLEX MAGNESIUM: 5.4 MMOL/L (ref 3.5–5)
POTASSIUM SERPL-SCNC: 5.4 MMOL/L (ref 3.5–5)
RBC # BLD: 3.76 E12/L (ref 3.5–5.5)
SODIUM BLD-SCNC: 138 MMOL/L (ref 132–146)
TOTAL PROTEIN: 6.3 G/DL (ref 6.4–8.3)
WBC # BLD: 7.4 E9/L (ref 4.5–11.5)

## 2022-03-13 PROCEDURE — 97161 PT EVAL LOW COMPLEX 20 MIN: CPT

## 2022-03-13 PROCEDURE — 2060000000 HC ICU INTERMEDIATE R&B

## 2022-03-13 PROCEDURE — 97530 THERAPEUTIC ACTIVITIES: CPT

## 2022-03-13 PROCEDURE — 6370000000 HC RX 637 (ALT 250 FOR IP): Performed by: INTERNAL MEDICINE

## 2022-03-13 PROCEDURE — 2580000003 HC RX 258: Performed by: INTERNAL MEDICINE

## 2022-03-13 PROCEDURE — 82962 GLUCOSE BLOOD TEST: CPT

## 2022-03-13 PROCEDURE — 36415 COLL VENOUS BLD VENIPUNCTURE: CPT

## 2022-03-13 PROCEDURE — 80048 BASIC METABOLIC PNL TOTAL CA: CPT

## 2022-03-13 PROCEDURE — 97165 OT EVAL LOW COMPLEX 30 MIN: CPT

## 2022-03-13 PROCEDURE — 85025 COMPLETE CBC W/AUTO DIFF WBC: CPT

## 2022-03-13 PROCEDURE — 6370000000 HC RX 637 (ALT 250 FOR IP)

## 2022-03-13 PROCEDURE — 1200000000 HC SEMI PRIVATE

## 2022-03-13 PROCEDURE — G0378 HOSPITAL OBSERVATION PER HR: HCPCS

## 2022-03-13 PROCEDURE — 97535 SELF CARE MNGMENT TRAINING: CPT

## 2022-03-13 PROCEDURE — 80053 COMPREHEN METABOLIC PANEL: CPT

## 2022-03-13 PROCEDURE — 6360000002 HC RX W HCPCS: Performed by: NEUROLOGICAL SURGERY

## 2022-03-13 RX ORDER — NALOXONE HYDROCHLORIDE 0.4 MG/ML
0.4 INJECTION, SOLUTION INTRAMUSCULAR; INTRAVENOUS; SUBCUTANEOUS PRN
Status: DISCONTINUED | OUTPATIENT
Start: 2022-03-13 | End: 2022-03-17 | Stop reason: HOSPADM

## 2022-03-13 RX ORDER — METHYLPREDNISOLONE ACETATE 80 MG/ML
80 INJECTION, SUSPENSION INTRA-ARTICULAR; INTRALESIONAL; INTRAMUSCULAR; SOFT TISSUE ONCE
Status: DISCONTINUED | OUTPATIENT
Start: 2022-03-14 | End: 2022-03-17 | Stop reason: HOSPADM

## 2022-03-13 RX ORDER — MORPHINE SULFATE 1 MG/ML
4 INJECTION, SOLUTION EPIDURAL; INTRATHECAL; INTRAVENOUS ONCE
Status: COMPLETED | OUTPATIENT
Start: 2022-03-14 | End: 2022-03-14

## 2022-03-13 RX ORDER — DIPHENHYDRAMINE HYDROCHLORIDE 50 MG/ML
25 INJECTION INTRAMUSCULAR; INTRAVENOUS EVERY 6 HOURS PRN
Status: DISCONTINUED | OUTPATIENT
Start: 2022-03-13 | End: 2022-03-17 | Stop reason: HOSPADM

## 2022-03-13 RX ADMIN — ATORVASTATIN CALCIUM 80 MG: 40 TABLET, FILM COATED ORAL at 20:20

## 2022-03-13 RX ADMIN — MORPHINE SULFATE 4 MG: 1 INJECTION, SOLUTION EPIDURAL; INTRATHECAL; INTRAVENOUS at 12:57

## 2022-03-13 RX ADMIN — Medication 1000 UNITS: at 09:21

## 2022-03-13 RX ADMIN — METOPROLOL SUCCINATE 25 MG: 25 TABLET, EXTENDED RELEASE ORAL at 09:21

## 2022-03-13 RX ADMIN — INSULIN LISPRO 2 UNITS: 100 INJECTION, SOLUTION INTRAVENOUS; SUBCUTANEOUS at 12:20

## 2022-03-13 RX ADMIN — GABAPENTIN 100 MG: 100 CAPSULE ORAL at 09:21

## 2022-03-13 RX ADMIN — Medication 10 ML: at 21:30

## 2022-03-13 RX ADMIN — LANTHANUM CARBONATE 1000 MG: 500 TABLET, CHEWABLE ORAL at 17:01

## 2022-03-13 RX ADMIN — INSULIN LISPRO 1 UNITS: 100 INJECTION, SOLUTION INTRAVENOUS; SUBCUTANEOUS at 20:20

## 2022-03-13 RX ADMIN — TRAMADOL HYDROCHLORIDE 50 MG: 50 TABLET, COATED ORAL at 09:21

## 2022-03-13 RX ADMIN — GABAPENTIN 100 MG: 100 CAPSULE ORAL at 20:20

## 2022-03-13 RX ADMIN — ALLOPURINOL 100 MG: 100 TABLET ORAL at 09:21

## 2022-03-13 RX ADMIN — GABAPENTIN 100 MG: 100 CAPSULE ORAL at 14:05

## 2022-03-13 RX ADMIN — INSULIN LISPRO 1 UNITS: 100 INJECTION, SOLUTION INTRAVENOUS; SUBCUTANEOUS at 17:02

## 2022-03-13 RX ADMIN — SODIUM ZIRCONIUM CYCLOSILICATE 10 G: 10 POWDER, FOR SUSPENSION ORAL at 12:20

## 2022-03-13 RX ADMIN — PANTOPRAZOLE SODIUM 40 MG: 40 TABLET, DELAYED RELEASE ORAL at 06:01

## 2022-03-13 RX ADMIN — ISOSORBIDE MONONITRATE 30 MG: 30 TABLET, EXTENDED RELEASE ORAL at 09:21

## 2022-03-13 RX ADMIN — LANTHANUM CARBONATE 1000 MG: 500 TABLET, CHEWABLE ORAL at 09:21

## 2022-03-13 ASSESSMENT — PAIN SCALES - GENERAL
PAINLEVEL_OUTOF10: 7
PAINLEVEL_OUTOF10: 7
PAINLEVEL_OUTOF10: 4

## 2022-03-13 NOTE — PROGRESS NOTES
[facility]  Nephrology Progress Note  Patient's Name: Bassem Alvarado    History of Present Illness from 3/10 Consult Note:  \"Claudia Lofton is a 78 y.o. female with a past medical history of breast CA, anemia, hypertension, hyperlipidemia, and glaucoma. She presented to [facility] ED on 3/8 following a fall. Per the ED provider note the patient had back pain/had fell. She was dialyzed on 3/9 at [facility]. Patient was transferred to [facility], as she can see neurosurgery here. Lab data today includes potassium 4.7, BUN 26, creatinine 4.7, and hemoglobin 11.7. We were consulted to see the patient for ESRD on HD MWF. Patient is known to our service and dialyzes at [address] MWF via a left AV fistula. At present, patient was seen and examined. She has family at the bedside. She reports that she had a fall after feeling weakness and pain in her back, on Tuesday morning. She explained that she was unable to ambulate at that time. She denies any current chest pain or shortness of breath. No abdominal pain, nausea, or vomiting. She denied any dizziness. \"    Subjective    3/13: Patient was seen and examined. She has family at the bedside. She explained that she is still having some back pain. She is eating okay. Denies any chest pain or shortness of breath. No abdominal pain or nausea.     Allergies:  Furosemide    Current Medications:    sodium zirconium cyclosilicate (LOKELMA) oral suspension 10 g, Once  Vitamin D (CHOLECALCIFEROL) tablet 1,000 Units, Daily  morphine (PF) injection 4 mg, Once  sodium chloride flush 0.9 % injection 5-40 mL, 2 times per day  sodium chloride flush 0.9 % injection 5-40 mL, PRN  0.9 % sodium chloride infusion, PRN  ondansetron (ZOFRAN-ODT) disintegrating tablet 4 mg, Q8H PRN   Or  ondansetron (ZOFRAN) injection 4 mg, Q6H PRN  polyethylene glycol (GLYCOLAX) packet 17 g, Daily PRN  acetaminophen (TYLENOL) tablet 650 mg, Q6H PRN   Or  acetaminophen (TYLENOL) suppository 650 mg, Q6H PRN  HYDROcodone-acetaminophen (NORCO) 5-325 MG per tablet 1 tablet, Q6H PRN  insulin lispro (HUMALOG) injection vial 0-6 Units, TID WC  insulin lispro (HUMALOG) injection vial 0-3 Units, Nightly  allopurinol (ZYLOPRIM) tablet 100 mg, Daily  atorvastatin (LIPITOR) tablet 80 mg, Nightly  gabapentin (NEURONTIN) capsule 100 mg, TID  hydrALAZINE (APRESOLINE) tablet 10 mg, BID  isosorbide mononitrate (IMDUR) extended release tablet 30 mg, Daily  lanthanum (FOSRENOL) chewable tablet 1,000 mg, TID WC  metoprolol succinate (TOPROL XL) extended release tablet 25 mg, Daily  midodrine (PROAMATINE) tablet 5 mg, PRN  pantoprazole (PROTONIX) tablet 40 mg, QAM AC  traMADol (ULTRAM) tablet 50 mg, Q6H PRN      No current facility-administered medications on file prior to encounter. Current Outpatient Medications on File Prior to Encounter   Medication Sig Dispense Refill    atorvastatin (LIPITOR) 80 MG tablet Take 80 mg by mouth nightly      bumetanide (BUMEX) 2 MG tablet Take 2 mg by mouth three times a week *SUN-TUE-THUR*      isosorbide mononitrate (IMDUR) 30 MG extended release tablet Take 30 mg by mouth daily      insulin glargine (LANTUS) 100 UNIT/ML injection vial Inject 11 Units into the skin nightly      hydrALAZINE (APRESOLINE) 10 MG tablet Take 1 tablet by mouth 2 times daily 180 tablet 1    metoprolol succinate (TOPROL XL) 25 MG extended release tablet Take 1 tablet by mouth daily 90 tablet 1    gabapentin (NEURONTIN) 100 MG capsule Take 1 capsule by mouth 3 times daily for 180 days.  Intended supply: 30 days 90 capsule 5    lanthanum (FOSRENOL) 1000 MG chewable tablet Take 1,000 mg by mouth 3 times daily (with meals)       allopurinol (ZYLOPRIM) 100 MG tablet Take 1 tablet by mouth daily 90 tablet 1    ticagrelor (BRILINTA) 90 MG TABS tablet Take 1 tablet by mouth 2 times daily 180 tablet 1    aspirin 81 MG EC tablet Take 1 tablet by mouth daily 30 tablet 3    B Complex-C-Folic Acid (BONI CAPS) 1 MG CAPS Take 1 mg by mouth nightly       LUMIGAN 0.01 % SOLN ophthalmic drops Place 1 drop into the right eye nightly       COMBIGAN 0.2-0.5 % ophthalmic solution Place 1 drop into the right eye every 12 hours   7    lidocaine-prilocaine (EMLA) 2.5-2.5 % cream Apply topically See Admin Instructions APPLY SMALL AMOUNT TO ACCESS SITE (AVF) 30-45 MINUTES BEFORE DIALYSIS. COVER WITH OCCLUSIVE DRESSING ( SARAN WRAP)      midodrine (PROAMATINE) 5 MG tablet Take 1 tablet by mouth as needed (may repeat x1 durring HD for assymptomatic SBP<100) 90 tablet 0    omeprazole (PRILOSEC) 20 MG delayed release capsule Take 20 mg by mouth daily as needed (GI DISCOMFORT)         Physical exam:  Vitals:    03/13/22 0800   BP: (!) 120/53   Pulse: 72   Resp: 16   Temp: 97.7 °F (36.5 °C)   SpO2: 92%     Wt Readings from Last 3 Encounters:   03/13/22 189 lb 9.5 oz (86 kg)   03/09/22 184 lb 1.4 oz (83.5 kg)   01/20/22 181 lb (82.1 kg)     Temp Readings from Last 3 Encounters:   03/13/22 97.7 °F (36.5 °C) (Temporal)   03/09/22 98.2 °F (36.8 °C)   02/28/22 98.1 °F (36.7 °C)     BP Readings from Last 3 Encounters:   03/13/22 (!) 120/53   03/09/22 102/60   01/20/22 (!) 165/74     Pulse Readings from Last 3 Encounters:   03/13/22 72   03/09/22 74   01/20/22 62       General: Awake, alert, in no acute distress   Neck: No JVD noted  Lungs: Clear bilaterally, no adventitious lung sounds, unlabored  CV: Regular rate and rhythm. No rub  Abd: Soft, nontender, nondistended. Active bowel sounds. Skin: Warm and dry. No rash on exposed extremities  Ext: RUE edema patient reports history of breast CA; LUE AV fistula  Neuro: Awake, answers questions appropriately    I/O last 3 completed shifts: In: 180 [P.O.:180]  Out: -   No intake/output data recorded.     Data:   Labs:  Lab Results   Component Value Date     03/13/2022     03/12/2022     03/11/2022    K 5.4 (H) 03/13/2022    K 5.4 (H) 03/13/2022    K 4.8 03/12/2022    K 4.8 03/12/2022    CL 97 (L) 03/13/2022    CO2 26 03/13/2022    CO2 27 03/12/2022    CO2 27 03/11/2022    CREATININE 6.2 (H) 03/13/2022    CREATININE 4.7 (H) 03/12/2022    CREATININE 6.2 (H) 03/11/2022    BUN 40 (H) 03/13/2022    BUN 28 (H) 03/12/2022    BUN 38 (H) 03/11/2022    GLUCOSE 116 (H) 03/13/2022    GLUCOSE 119 (H) 03/12/2022    GLUCOSE 100 (H) 03/11/2022    PHOS 5.8 (H) 03/11/2022    PHOS 3.1 05/25/2021    PHOS 4.1 05/24/2021    WBC 7.4 03/13/2022    WBC 6.5 03/12/2022    WBC 6.9 03/11/2022    HGB 10.8 (L) 03/13/2022    HGB 10.7 (L) 03/12/2022    HGB 10.8 (L) 03/11/2022    HCT 34.8 03/13/2022    HCT 35.0 03/12/2022    HCT 35.0 03/11/2022    MCV 92.6 03/13/2022     03/13/2022     Lab Results   Component Value Date     03/13/2022    K 5.4 03/13/2022    K 5.4 03/13/2022    CL 97 03/13/2022    CO2 26 03/13/2022    BUN 40 03/13/2022    CREATININE 6.2 03/13/2022    GLUCOSE 116 03/13/2022    GLUCOSE 46 04/02/2012    CALCIUM 9.7 03/13/2022       Recent Labs     03/13/22  0556 03/12/22  0556 03/11/22  0657   WBC 7.4 6.5 6.9   HGB 10.8* 10.7* 10.8*   HCT 34.8 35.0 35.0   MCV 92.6 93.1 93.3    190 189      Lab Results   Component Value Date    COLORU Yellow 11/07/2017    NITRU Negative 11/07/2017    GLUCOSEU Negative 11/07/2017    GLUCOSEU NEGATIVE 08/27/2011    KETUA Negative 11/07/2017    UROBILINOGEN 0.2 11/07/2017    BILIRUBINUR Negative 11/07/2017    BILIRUBINUR NEGATIVE 08/27/2011      Lab Results   Component Value Date    CALCIUM 9.7 03/13/2022    PHOS 5.8 (H) 03/11/2022      Lab Results   Component Value Date    IRON 33 (L) 11/09/2017    TIBC 222 (L) 11/09/2017    FERRITIN 334 11/09/2017     Imaging:  No results found. No results found for this or any previous visit. Assessment/ Plans:    1.  ESRD on HD  OP [facility] MWF   left AV fistula  Continue HD MWF    2.  Back pain persists  S/p fall 3/8  CT lumbar spine 3/8 negative for

## 2022-03-13 NOTE — PROGRESS NOTES
Occupational Therapy  OCCUPATIONAL THERAPY INITIAL EVALUATION    [facility] [address] [place] [address]      Date:3/13/2022                                                Patient Name: Janny Mcconnell  MRN: [id number]  : 1955  Room: [address] [place] [address] [id number]    Referring Provider: Stephon Tsai MD  Specific Provider Orders/Date: OT eval and treat 03/10/22    Diagnosis: Lumbar stenosis without neurogenic claudication [M48.061]  Lumbar foraminal stenosis [M48.061]  Back pain [M54.9]   Pt admitted to hospital on 3/8/22 following fall - +back pain radiating to LE's  Per NS, pt decided against surgery, decided on epidural nerve block    Surgery / Procedure: Temporary Lumbar epidural catheter inserted using fluoroscopy on 3/12     Pertinent Medical History:  has a past medical history of Acute infection of bone ([address] Acute osteomyelitis of phalanx of left hand ([address] Anemia of chronic disease, Arthritis, Breast cancer ([address] CAD (coronary artery disease), Carpal tunnel syndrome, Chronic diastolic CHF (congestive heart failure) ([address] CKD (chronic kidney disease) stage 4, GFR 15-29 ml/min ([address] Diabetic retinopathy ([address] Glaucoma, Hemodialysis patient ([address] Hyperkalemia, diminished renal excretion, Hyperlipidemia, Hypertension, 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  Spinal cord stimulator      Precautions:  Fall Risk, spine neutrality for pain management, epidural, O2, TAPS, HD MWF    Assessment of current deficits    [x] Functional mobility  [x]ADLs  [x] Strength               []Cognition    [x] Functional transfers   [x] IADLs         [x] Safety Awareness   [x]Endurance    [] Fine Coordination              [x] Balance      [] Vision/perception   []Sensation     []Gross Motor Coordination  [] ROM  [] Delirium                   [] Motor Control     OT PLAN OF CARE   OT POC based on physician orders, patient diagnosis and results of clinical assessment    Frequency/Duration 2-3 days/wk for 2 weeks PRN   Specific OT Treatment Interventions to include:   * Instruction/training on adapted ADL techniques and AE recommendations to increase functional independence within precautions       * Training on energy conservation strategies, correct breathing pattern and techniques to improve independence/tolerance for self-care routine  * Functional transfer/mobility training/DME recommendations for increased independence, safety, and fall prevention  * Patient/Family education to increase follow through with safety techniques and functional independence  * Recommendation of environmental modifications for increased safety with functional transfers/mobility and ADLs  * Therapeutic exercise to improve motor endurance, ROM, and functional strength for ADLs/functional transfers  * Therapeutic activities to facilitate/challenge dynamic balance, stand tolerance for increased safety and independence with ADLs  * Therapeutic activities to facilitate gross/fine motor skills for increased independence with ADLs  * Positioning to improve skin integrity, interaction with environment and functional independence    Recommended Adaptive Equipment: TBD     Home Living: Pt lives alone in 1 floor condo. 0 [address]   Bathroom setup: tub/shower with grab bars and tub transfer bench, 3:1 over commode   Equipment owned: 3:1 BSC, w/w, cane    Prior Level of Function: independent with ADLs , assist with heavy IADLs, mod I for light IADL's; ambulated w/o AD   Driving: no - family/friends assist    Pain Level: Pt c/o 9/10 back pain this session ; decreased to 7/10 at end of session following repositioning.  RN aware - medicated prior to session. Cognition: A&O: 4/4; Follows 1-2 step directions   Memory:  good    Sequencing:  good    Problem solving:  fair    Judgement/safety:  fair      Functional Assessment:  AM-PAC Daily Activity Raw Score: 13/24   Initial Eval Status  Date: 3/13/22 Treatment Status  Date: STGs = LTGs  Time frame: 10-14 days   Feeding Stand by Assist   Modified [place]    Grooming Minimal Assist   Modified [place]    UB Dressing Minimal Assist   Modified [place]    LB Dressing Dependent   Limited by back pain  Minimal Assist    Bathing Maximal Assist  Minimal Assist    Toileting Dependent   Minimal Assist    Bed Mobility  Rolling: Mod A  Supine to sit: Moderate Assist   Sit to supine: Moderate Assist   Rolling: SBA  Supine to sit: Stand by Assist   Sit to supine: Stand by Assist    Functional Transfers NT  Deferred secondary to increased pain during EOB tasks  Stand by Assist    Functional Mobility NT  Stand by Assist    Balance Sitting:     Static:  SBA    Dynamic:Min A  Standing: NT     Activity Tolerance Poor+  Limited by pain  Fair+   Visual/  Perceptual Glasses: yes                  Hand Dominance R   AROM (PROM) Strength Additional Info:    RUE  WFL Deferred d/t back pain  Appears WFL - noted during ADL's and bed mobility good  and wfl FMC/dexterity noted during ADL tasks       LUE WFL Deferred d/t back pain  Appears WFL - noted during ADL's and bed mobility good  and wfl FMC/dexterity noted during ADL tasks       Hearing: [facility]  Sensation:  No c/o numbness or tingling   Tone: WFL   Edema: R UE (chronic)    Comments: Upon arrival patient lying in bed. Therapist educated pt on role of OT. At end of session, patient lying in bed (repositioned for comfort) with call light and phone within reach, all lines and tubes intact. Overall patient demonstrated decreased independence and safety during completion of ADL/functional transfer/mobility tasks.   Pt would benefit from continued skilled OT to increase safety and independence with completion of ADL/IADL tasks for functional independence and quality of life. Treatment: OT treatment provided this date includes: Facilitation of bed mobility (education/cues for body mechanics-logroll technique/spine neutrality) and unsupported sitting balance (addressing posture, light weight shifting and safety to prep for ADL's. Also educated pt on spine neutrality to manage pain). Therapist facilitated self-care retraining: UB/LB self-care tasks (bathing task EOB, donned/doffed gown) and seated grooming tasks while educating pt on modified techniques, pain management, posture, safety and energy conservation techniques. Deferred OOB tasks d/t increased back pain w/ light EOB activity. Repositioned for comfort while reinforcing benefits of pillow props and TAPS for joint and skin integrity. RN aware. Skilled monitoring of HR, O2 sats and pts response to treatment. Pt on 2L O2 via nasal cannula. O2 sat=^95% during session. Rehab Potential: Good for established goals     Patient / Family Goal: to manage pain and increase independence      Patient and/or family were instructed on functional diagnosis, prognosis/goals and OT plan of care. Demonstrated good understanding. Eval Complexity: Low    Time In: 09:50  Time Out: 10:15  Total Treatment Time: 13 minutes    Min Units   OT Eval Low 97165  X  1   OT Eval Medium 62507      OT Eval High 10511      OT Re-Eval [id number]       Therapeutic Ex 36246       Therapeutic Activities 74543  5  0   ADL/Self Care 05285  8  1   Orthotic Management 22355       Manual [id number]     Neuro Re-Ed 11883       Non-Billable Time          Evaluation Time additionally includes thorough review of current medical information, gathering information on past medical history/social history and prior level of function, interpretation of standardized testing/informal observation of tasks, assessment of data and development of plan of care and goals.         [employer] Juan Ramon, OTR/L #[id number]

## 2022-03-13 NOTE — PROGRESS NOTES
Hospitalist Progress Note      SYNOPSIS: Patient admitted on 3/9/2022 for low back pain. This is a 23-year-old black female who was transferred from [facility] because of back pain. Symptoms started few days ago when she took a step and fell on the ground. She denies lower extremity weakness. She denies chest pain, no hematuria dysuria hematemesis or hematochezia. She denies subjective fever or chills. Patient has a history of discectomy and a spinal cord stimulator placed for pain by Dr. Sue Dewey. At the time of examination patient continues to complain of back pain without weakness. After prolonged discussion between family and neurosurgery, patient decided to have epidural nerve block. A temporary lumbar epidural catheter inserted using fluoroscopy on 3/12/2022. SUBJECTIVE:    Patient seen and examined in her room. Back pain persists  Denies any chest pain, nausea, vomiting, shortness of breath. Records reviewed. Stable overnight. No other overnight issues reported. Temp (24hrs), Av.7 °F (36.5 °C), Min:97.3 °F (36.3 °C), Max:98.2 °F (36.8 °C)    DIET: ADULT DIET; Regular; Low Fat/Low Chol/High Fiber/2 gm Na; Low Potassium (Less than 3000 mg/day); Low Phosphorus (Less than 1000 mg)  CODE: Full Code    Intake/Output Summary (Last 24 hours) at 3/13/2022 0820  Last data filed at 3/12/2022 1300  Gross per 24 hour   Intake 180 ml   Output --   Net 180 ml       OBJECTIVE:    BP (!) 120/53   Pulse 72   Temp 97.7 °F (36.5 °C) (Temporal)   Resp 16   Ht 5' 10\" (1.778 m)   Wt 189 lb 9.5 oz (86 kg)   SpO2 92%   BMI 27.20 kg/m²     General appearance: No apparent distress, appears stated age and cooperative. HEENT:  Conjunctivae/corneas clear. Neck: Supple. No jugular venous distention.    Respiratory: Clear to auscultation bilaterally, normal respiratory effort  Cardiovascular: Regular rate rhythm, normal S1-S2  Abdomen: Soft, nontender, nondistended  Musculoskeletal: No clubbing, cyanosis, no bilateral lower extremity edema. Brisk capillary refill. Skin:  No rashes  on visible skin  Neurologic: awake, alert and following commands     ASSESSMENT & PLAN:      1. Back pain with spinal stenosis: Cervical and lumbar spine CT consistent with  Severe central canal stenosis at L2-3.  Moderate stenosis at L1-2.   5.  Multilevel neural foraminal stenoses, worst (severe) at L2-3.  Moderate   to severe stenoses at L1-2.    Pain control as indicated. Neurosurgery consultation  Patient opted for epidural nerve block creatinine back surgery, a temporary catheter placed on 3/12, plan for nerve block on 3/30  Preop clearance by cardiology     2. End-stage renal disease on hemodialysis: Monday Wednesday Friday, consult nephrology for renal replacement therapy. Received dialysis at [facility] on 3/9/2022, next dialysis as per nephrology     3. Chronic anemia secondary to end-stage renal disease: Monitor H&H, continue JAYSHREE.     4.  Essential hypertension: Continue outpatient medications with hydralazine and metoprolol.     5. Hyperlipidemia: Continue statin     6.   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  Gout without acute attack: Continue allopurinol           Diet: Diet NPO  Code Status: Full Code        DISPOSITION:   Unclear discharge disposition at the moment    Medications:  REVIEWED DAILY    Infusion Medications    sodium chloride       Scheduled Medications    Vitamin D  1,000 Units Oral Daily    morphine (PF)  4 mg Epidural Once    sodium chloride flush  5-40 mL IntraVENous 2 times per day    insulin lispro  0-6 Units SubCUTAneous TID     insulin lispro  0-3 Units SubCUTAneous Nightly    allopurinol  100 mg Oral Daily    atorvastatin  80 mg Oral Nightly    gabapentin  100 mg Oral TID    hydrALAZINE  10 mg Oral BID    isosorbide mononitrate  30 mg Oral Daily    lanthanum  1,000 mg Oral TID     metoprolol succinate  25 mg Oral Daily    pantoprazole  40 mg Oral QAM AC     PRN Meds: sodium chloride flush, sodium chloride, ondansetron **OR** ondansetron, polyethylene glycol, acetaminophen **OR** acetaminophen, HYDROcodone 5 mg - acetaminophen, midodrine, traMADol    Labs:     Recent Labs     03/11/22  0657 03/12/22  0556 03/13/22  0556   WBC 6.9 6.5 7.4   HGB 10.8* 10.7* 10.8*   HCT 35.0 35.0 34.8    190 204       Recent Labs     03/11/22  0657 03/11/22  0657 03/12/22  0556 03/13/22  0556     --  136 138   K 5.1*  5.1*   < > 4.8  4.8 5.4*   CL 97*  --  95* 97*   CO2 27  --  27 26   BUN 38*  --  28* 40*   CREATININE 6.2*  --  4.7* 6.2*   CALCIUM 9.4  --  9.3 9.7   PHOS 5.8*  --   --   --     < > = values in this interval not displayed. Recent Labs     03/11/22  0657 03/12/22  0556 03/13/22  0556   PROT 6.5 6.4 6.3*   ALKPHOS 77 69 68   ALT 18 16 18   AST 27 22 21   BILITOT 0.8 0.7 0.5       No results for input(s): INR in the last 72 hours. No results for input(s): Chyrel Lions in the last 72 hours. Chronic labs:    Lab Results   Component Value Date    CHOL 101 05/19/2021    TRIG 106 05/19/2021    HDL 38 05/19/2021    LDLCALC 42 05/19/2021    TSH 2.810 05/19/2021    INR 1.0 01/20/2022    LABA1C 5.7 09/07/2021       Radiology: REVIEWED DAILY    +++++++++++++++++++++++++++++++++++++++++++++++++  Tina Becerra MD  [facility] [place] Physician - [address] [facility], [place]  +++++++++++++++++++++++++++++++++++++++++++++++++  NOTE: This report was transcribed using voice recognition software. Every effort was made to ensure accuracy; however, inadvertent computerized transcription errors may be present.

## 2022-03-13 NOTE — PROGRESS NOTES
4 mg preservative free morphine given IM as epidural catheter is blocked with new Depomedrol  Plan 3rd injection in am.    Stable from a neurosurgical standpoint.

## 2022-03-13 NOTE — PROGRESS NOTES
Physical Therapy  Physical Therapy Initial Assessment     Name: Dorie Thomas  : 1955  MRN: [id number]      Date of Service: 3/13/2022    Evaluating PT:  Mayuri Moore PT, DPT [id number]    Room #:  2797/8372-I  Diagnosis:  Lumbar stenosis without neurogenic claudication [M48.061]  Lumbar foraminal stenosis [M48.061]  Back pain [M54.9]  PMHx/PSHx:  Acute infection of bone, anemia, arthritis, CAD, carpal tunnel syndrome, CAD, CHF, CKD, diabetic retinopathy, glaucoma, HD patient, HTN, HLD, DM, vitreous hemorrhage, ventricular hypertrophy   Procedure/Surgery:  Temporary lumbar epidural placed via fluoroscopy 3/12  Precautions:  Falls  Equipment Needs:  TBD pending patient progress    SUBJECTIVE:    Pt lives alone in a 1 story home with no stairs to enter and no rail. Bed is on 1st floor and bath is on 1st floor. Pt ambulated with cane in home and [employer] in community PTA. OBJECTIVE:   Initial Evaluation  Date: 3/13/22 Treatment Short Term/ Long Term   Goals   AM-PAC 6 Clicks 50/72     Was pt agreeable to Eval/treatment? yes     Does pt have pain? Significant pain during rest and mobility, does not rate     Bed Mobility  Rolling: ModA  Supine to sit: ModA  Sit to supine: ModA  Scooting: ModA  Rolling: Independent   Supine to sit: Independent   Sit to supine: Independent   Scooting: Independent    Transfers Sit to stand: ModA  Stand to sit: ModA  Stand pivot: NT  Sit to stand: Independent   Stand to sit: Independent   Stand pivot: Mod I with [employer]   Ambulation    NT, limited by pain  50 feet with [employer] Mod I   Stair negotiation: ascended and descended  NT  TBD   ROM BUE:  Defer to OT note  BLE:  WFL     Strength BUE:  Defer to OT note  BLE:  Grossly 3/5  WFL   Balance Sitting EOB:  SBA<>Min  Dynamic Standing:  ModA to [employer]  Sitting EOB:  Independent   Dynamic Standing:   Mod I with [employer]     Pt is A & O x 4  Sensation:  Pt denies numbness and tingling to extremities  Edema:  None noted    Vitals:  SPO2 monitored on 2L: 93-98%    Patient education  Pt educated on role of PT, safety during mobility    Patient response to education:   Pt verbalized understanding Pt demonstrated skill Pt requires further education in this area   yes yes yes     ASSESSMENT:    Conditions Requiring Skilled Therapeutic Intervention:    [x]Decreased strength     [x]Decreased ROM  [x]Decreased functional mobility  [x]Decreased balance   [x]Decreased endurance   [x]Decreased posture  []Decreased sensation  []Decreased coordination   []Decreased vision  []Decreased safety awareness   [x]Increased pain       Comments:  Patient long sitting in bed upon entry and agreeable to PT evaluation. Patient with significant pain during session, but able to complete tasks with extra time and assist. Assist to BLEs and trunk for bed mobility required. At EOB, patient with significant forward flexed posture with elbows on thighs for approximately 5 minutes. Several attempts made before patient successful at sitting upright with UEs on bed for assist. STS from EOB with significant lift assist with patient able to maintain for approximately 10 seconds before requesting to return to sitting. Assisted patient back to bed and positioned in seated position with all needs met. Treatment:  Patient practiced and was instructed in the following treatment:     Bed Mobility: VCs provided for sequencing and safety during mobility. Manual assist provided for completion of task.  Transfer Training: Verbal and tactile cueing provided for sequencing and safety during mobility. Manual assist provided for completion of task    Therapeutic Activities:  Patient sat EOB for approximately 8 minutes during session with occasional assist for sitting balance    Pt's/ family goals   1. None stated    Prognosis is good for reaching above PT goals. Patient and or family understand(s) diagnosis, prognosis, and plan of care.   yes    PHYSICAL THERAPY PLAN OF CARE:    PT POC is established based on physician order and patient diagnosis     Referring provider/PT Order:    03/10/22 0300  PT eval and treat  Start:  03/10/22 0300,   End:  03/10/22 0300,   ONE TIME,   Standing Count:  1 Occurrences,   R        Order went unreviewed at transfer on Thu Mar 10, 2022  6:58 AM    Drea Bacon MD       Diagnosis:  Lumbar stenosis without neurogenic claudication [M48.061]  Lumbar foraminal stenosis [M48.061]  Back pain [M54.9]  Specific instructions for next treatment:  Progress mobility as appropriate    Current Treatment Recommendations:     [x] Strengthening to improve independence with functional mobility   [x] ROM to improve independence with functional mobility   [x] Balance Training to improve static/dynamic balance and to reduce fall risk  [x] Endurance Training to improve activity tolerance during functional mobility   [x] Transfer Training to improve safety and independence with all functional transfers   [x] Gait Training to improve gait mechanics, endurance and assess need for appropriate assistive device  [] Stair Training in preparation for safe discharge home and/or into the community   [] Positioning to prevent skin breakdown and contractures  [x] Safety and Education Training   [x] Patient/Caregiver Education   [x] HEP  [x] Other     PT long term treatment goals are located in above grid    Frequency of treatments: 2-5x/week x 1-2 weeks. Time in  0830  Time out  0855    Total Treatment Time  10 minutes     Evaluation Time includes thorough review of current medical information, gathering information on past medical history/social history and prior level of function, completion of standardized testing/informal observation of tasks, assessment of data and education on plan of care and goals.     CPT codes:  [x] Low Complexity PT evaluation 73008  [] Moderate Complexity PT evaluation 04782  [] High Complexity PT evaluation 95218  [] PT Re-evaluation 47639  [] Gait training 28586 - minutes  [] Manual therapy 68204 - minutes  [x] Therapeutic activities 31777 10 minutes  [] Therapeutic exercises 54396 - minutes  [] Neuromuscular reeducation 17117 - minutes     Para Cord PT, DPT  [id number]

## 2022-03-14 ENCOUNTER — TELEPHONE (OUTPATIENT)
Dept: CARDIOTHORACIC SURGERY | Age: 67
End: 2022-03-14

## 2022-03-14 LAB
ALBUMIN SERPL-MCNC: 3.5 G/DL (ref 3.5–5.2)
ALP BLD-CCNC: 74 U/L (ref 35–104)
ALT SERPL-CCNC: 21 U/L (ref 0–32)
ANION GAP SERPL CALCULATED.3IONS-SCNC: 14 MMOL/L (ref 7–16)
AST SERPL-CCNC: 25 U/L (ref 0–31)
BASOPHILS ABSOLUTE: 0.03 E9/L (ref 0–0.2)
BASOPHILS RELATIVE PERCENT: 0.4 % (ref 0–2)
BILIRUB SERPL-MCNC: 0.5 MG/DL (ref 0–1.2)
BUN BLDV-MCNC: 51 MG/DL (ref 6–23)
CALCIUM SERPL-MCNC: 10.3 MG/DL (ref 8.6–10.2)
CHLORIDE BLD-SCNC: 96 MMOL/L (ref 98–107)
CO2: 27 MMOL/L (ref 22–29)
CREAT SERPL-MCNC: 7.6 MG/DL (ref 0.5–1)
EOSINOPHILS ABSOLUTE: 0.2 E9/L (ref 0.05–0.5)
EOSINOPHILS RELATIVE PERCENT: 2.4 % (ref 0–6)
GFR AFRICAN AMERICAN: 6
GFR NON-AFRICAN AMERICAN: 6 ML/MIN/1.73
GLUCOSE BLD-MCNC: 125 MG/DL (ref 74–99)
HCT VFR BLD CALC: 34.4 % (ref 34–48)
HEMOGLOBIN: 10.7 G/DL (ref 11.5–15.5)
IMMATURE GRANULOCYTES #: 0.04 E9/L
IMMATURE GRANULOCYTES %: 0.5 % (ref 0–5)
LYMPHOCYTES ABSOLUTE: 1.08 E9/L (ref 1.5–4)
LYMPHOCYTES RELATIVE PERCENT: 12.7 % (ref 20–42)
MCH RBC QN AUTO: 28.8 PG (ref 26–35)
MCHC RBC AUTO-ENTMCNC: 31.1 % (ref 32–34.5)
MCV RBC AUTO: 92.5 FL (ref 80–99.9)
METER GLUCOSE: 117 MG/DL (ref 74–99)
METER GLUCOSE: 120 MG/DL (ref 74–99)
METER GLUCOSE: 189 MG/DL (ref 74–99)
METER GLUCOSE: 197 MG/DL (ref 74–99)
MONOCYTES ABSOLUTE: 1.06 E9/L (ref 0.1–0.95)
MONOCYTES RELATIVE PERCENT: 12.5 % (ref 2–12)
NEUTROPHILS ABSOLUTE: 6.08 E9/L (ref 1.8–7.3)
NEUTROPHILS RELATIVE PERCENT: 71.5 % (ref 43–80)
PDW BLD-RTO: 15.9 FL (ref 11.5–15)
PLATELET # BLD: 240 E9/L (ref 130–450)
PMV BLD AUTO: 11.1 FL (ref 7–12)
POTASSIUM REFLEX MAGNESIUM: 5.3 MMOL/L (ref 3.5–5)
POTASSIUM SERPL-SCNC: 5.3 MMOL/L (ref 3.5–5)
RBC # BLD: 3.72 E12/L (ref 3.5–5.5)
SODIUM BLD-SCNC: 137 MMOL/L (ref 132–146)
TOTAL PROTEIN: 6.7 G/DL (ref 6.4–8.3)
WBC # BLD: 8.5 E9/L (ref 4.5–11.5)

## 2022-03-14 PROCEDURE — G0378 HOSPITAL OBSERVATION PER HR: HCPCS

## 2022-03-14 PROCEDURE — 80053 COMPREHEN METABOLIC PANEL: CPT

## 2022-03-14 PROCEDURE — 85025 COMPLETE CBC W/AUTO DIFF WBC: CPT

## 2022-03-14 PROCEDURE — 6370000000 HC RX 637 (ALT 250 FOR IP): Performed by: INTERNAL MEDICINE

## 2022-03-14 PROCEDURE — 82962 GLUCOSE BLOOD TEST: CPT

## 2022-03-14 PROCEDURE — 36415 COLL VENOUS BLD VENIPUNCTURE: CPT

## 2022-03-14 PROCEDURE — 2580000003 HC RX 258: Performed by: INTERNAL MEDICINE

## 2022-03-14 PROCEDURE — 6360000002 HC RX W HCPCS: Performed by: NEUROLOGICAL SURGERY

## 2022-03-14 PROCEDURE — 90935 HEMODIALYSIS ONE EVALUATION: CPT

## 2022-03-14 PROCEDURE — 2700000000 HC OXYGEN THERAPY PER DAY

## 2022-03-14 PROCEDURE — 1200000000 HC SEMI PRIVATE

## 2022-03-14 PROCEDURE — 80048 BASIC METABOLIC PNL TOTAL CA: CPT

## 2022-03-14 RX ADMIN — INSULIN LISPRO 1 UNITS: 100 INJECTION, SOLUTION INTRAVENOUS; SUBCUTANEOUS at 23:04

## 2022-03-14 RX ADMIN — PANTOPRAZOLE SODIUM 40 MG: 40 TABLET, DELAYED RELEASE ORAL at 05:54

## 2022-03-14 RX ADMIN — METOPROLOL SUCCINATE 25 MG: 25 TABLET, EXTENDED RELEASE ORAL at 13:31

## 2022-03-14 RX ADMIN — ATORVASTATIN CALCIUM 80 MG: 40 TABLET, FILM COATED ORAL at 20:20

## 2022-03-14 RX ADMIN — INSULIN LISPRO 1 UNITS: 100 INJECTION, SOLUTION INTRAVENOUS; SUBCUTANEOUS at 17:09

## 2022-03-14 RX ADMIN — TRAMADOL HYDROCHLORIDE 50 MG: 50 TABLET, COATED ORAL at 14:30

## 2022-03-14 RX ADMIN — ALLOPURINOL 100 MG: 100 TABLET ORAL at 13:31

## 2022-03-14 RX ADMIN — GABAPENTIN 100 MG: 100 CAPSULE ORAL at 20:20

## 2022-03-14 RX ADMIN — MORPHINE SULFATE 4 MG: 1 INJECTION, SOLUTION EPIDURAL; INTRATHECAL; INTRAVENOUS at 13:31

## 2022-03-14 RX ADMIN — HYDRALAZINE HYDROCHLORIDE 10 MG: 10 TABLET, FILM COATED ORAL at 17:09

## 2022-03-14 RX ADMIN — ACETAMINOPHEN 650 MG: 325 TABLET ORAL at 15:44

## 2022-03-14 RX ADMIN — Medication 10 ML: at 21:20

## 2022-03-14 RX ADMIN — LANTHANUM CARBONATE 1000 MG: 500 TABLET, CHEWABLE ORAL at 17:09

## 2022-03-14 RX ADMIN — ISOSORBIDE MONONITRATE 30 MG: 30 TABLET, EXTENDED RELEASE ORAL at 13:31

## 2022-03-14 RX ADMIN — HYDRALAZINE HYDROCHLORIDE 10 MG: 10 TABLET, FILM COATED ORAL at 13:31

## 2022-03-14 RX ADMIN — GABAPENTIN 100 MG: 100 CAPSULE ORAL at 13:31

## 2022-03-14 ASSESSMENT — PAIN DESCRIPTION - LOCATION
LOCATION: BACK

## 2022-03-14 ASSESSMENT — PAIN SCALES - GENERAL
PAINLEVEL_OUTOF10: 9
PAINLEVEL_OUTOF10: 2
PAINLEVEL_OUTOF10: 7
PAINLEVEL_OUTOF10: 9
PAINLEVEL_OUTOF10: 4
PAINLEVEL_OUTOF10: 3

## 2022-03-14 ASSESSMENT — PAIN DESCRIPTION - PAIN TYPE
TYPE: CHRONIC PAIN

## 2022-03-14 ASSESSMENT — PAIN SCALES - WONG BAKER
WONGBAKER_NUMERICALRESPONSE: 0
WONGBAKER_NUMERICALRESPONSE: 0

## 2022-03-14 ASSESSMENT — PAIN DESCRIPTION - DESCRIPTORS
DESCRIPTORS: ACHING;DISCOMFORT;SORE

## 2022-03-14 NOTE — PROGRESS NOTES
rashes  on visible skin  Neurologic: awake, alert and following commands     ASSESSMENT & PLAN:      1. Back pain with spinal stenosis: Cervical and lumbar spine CT consistent with  Severe central canal stenosis at L2-3.  Moderate stenosis at L1-2.   5.  Multilevel neural foraminal stenoses, worst (severe) at L2-3.  Moderate   to severe stenoses at L1-2.    Pain control as indicated. Neurosurgery consultation  Patient opted for epidural nerve block creatinine back surgery, a temporary catheter placed on 3/12, plan for nerve block on 3/14  Preop clearance by cardiology     2. End-stage renal disease on hemodialysis: Monday Wednesday Friday, consult nephrology for renal replacement therapy. Received dialysis at [facility] on 3/9/2022, next dialysis as per nephrology     3. Chronic anemia secondary to end-stage renal disease: Monitor H&H, continue JAYSHREE.     4.  Essential hypertension: Continue outpatient medications with hydralazine and metoprolol.     5. Hyperlipidemia: Continue statin     6.   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  Gout without acute attack: Continue allopurinol           Diet: Diet NPO  Code Status: Full Code        DISPOSITION:   Unclear discharge disposition at the moment    Medications:  REVIEWED DAILY    Infusion Medications    sodium chloride       Scheduled Medications    morphine (PF)  4 mg Epidural Once    methylPREDNISolone acetate  80 mg Epidural Once    Vitamin D  1,000 Units Oral Daily    sodium chloride flush  5-40 mL IntraVENous 2 times per day    insulin lispro  0-6 Units SubCUTAneous TID     insulin lispro  0-3 Units SubCUTAneous Nightly    allopurinol  100 mg Oral Daily    atorvastatin  80 mg Oral Nightly    gabapentin  100 mg Oral TID    hydrALAZINE  10 mg Oral BID    isosorbide mononitrate  30 mg Oral Daily    lanthanum  1,000 mg Oral TID     metoprolol succinate  25 mg Oral Daily    pantoprazole  40 mg Oral QAM

## 2022-03-14 NOTE — FLOWSHEET NOTE
03/14/22 1051   Vital Signs   BP (!) 129/45   Temp 97.9 °F (36.6 °C)   Pulse 68   Resp 16   Weight 179 lb 14.3 oz (81.6 kg)   Weight Method Bed scale   Percent Weight Change -2.74   Post-Hemodialysis Assessment   Post-Treatment Procedures Blood returned; Access bleeding time < 10 minutes   Machine Disinfection Process Exterior Machine Disinfection   Rinseback Volume (ml) 300 ml   Dialyzer Clearance Lightly streaked   Duration of Treatment (minutes) 210 minutes   Heparin amount administered during treatment (units) 0 units   Hemodialysis Intake (ml) 300 ml   Hemodialysis Output (ml) 2300 ml   NET Removed (ml) 2000 ml   Tolerated Treatment Good   Patient Response to Treatment tolerated well, blood returned, needles pulled, sites held, stasis achieved, bandaids applied, +thirll, +bruit

## 2022-03-14 NOTE — PROGRESS NOTES
Feeling a little better. 4 mg preservative free morphine injected   Epidural catheter discontinued. Stable from a neurosurgical standpoint. Spoke with brother.   Answered questions

## 2022-03-14 NOTE — PROGRESS NOTES
[facility]  Nephrology Progress Note  Patient's Name: Rosana Casarez    History of Present Illness from 3/10 Consult Note:  \"Claudia Palmer is a 78 y.o. female with a past medical history of breast CA, anemia, hypertension, hyperlipidemia, and glaucoma. She presented to [facility] ED on 3/8 following a fall. Per the ED provider note the patient had back pain/had fell. She was dialyzed on 3/9 at [facility]. Patient was transferred to [facility], as she can see neurosurgery here. Lab data today includes potassium 4.7, BUN 26, creatinine 4.7, and hemoglobin 11.7. We were consulted to see the patient for ESRD on HD MWF. Patient is known to our service and dialyzes at [address] MWF via a left AV fistula. At present, patient was seen and examined. She has family at the bedside. She reports that she had a fall after feeling weakness and pain in her back, on Tuesday morning. She explained that she was unable to ambulate at that time. She denies any current chest pain or shortness of breath. No abdominal pain, nausea, or vomiting. She denied any dizziness. \"    Subjective    3/14: Examined during HD treatment.  States she is ok, continues to have back pain,     Allergies:  Furosemide    Current Medications:    morphine (PF) injection 4 mg, Once  methylPREDNISolone acetate (DEPO-MEDROL) injection 80 mg, Once  naloxone (NARCAN) injection 0.4 mg, PRN  diphenhydrAMINE (BENADRYL) injection 25 mg, Q6H PRN  Vitamin D (CHOLECALCIFEROL) tablet 1,000 Units, Daily  sodium chloride flush 0.9 % injection 5-40 mL, 2 times per day  sodium chloride flush 0.9 % injection 5-40 mL, PRN  0.9 % sodium chloride infusion, PRN  ondansetron (ZOFRAN-ODT) disintegrating tablet 4 mg, Q8H PRN   Or  ondansetron (ZOFRAN) injection 4 mg, Q6H PRN  polyethylene glycol (GLYCOLAX) packet 17 g, Daily PRN  acetaminophen (TYLENOL) tablet 650 mg, Q6H PRN   Or  acetaminophen (TYLENOL) suppository 650 mg, Q6H PRN  HYDROcodone-acetaminophen (NORCO) 5-325 MG per tablet 1 tablet, Q6H PRN  insulin lispro (HUMALOG) injection vial 0-6 Units, TID WC  insulin lispro (HUMALOG) injection vial 0-3 Units, Nightly  allopurinol (ZYLOPRIM) tablet 100 mg, Daily  atorvastatin (LIPITOR) tablet 80 mg, Nightly  gabapentin (NEURONTIN) capsule 100 mg, TID  hydrALAZINE (APRESOLINE) tablet 10 mg, BID  isosorbide mononitrate (IMDUR) extended release tablet 30 mg, Daily  lanthanum (FOSRENOL) chewable tablet 1,000 mg, TID WC  metoprolol succinate (TOPROL XL) extended release tablet 25 mg, Daily  midodrine (PROAMATINE) tablet 5 mg, PRN  pantoprazole (PROTONIX) tablet 40 mg, QAM AC  traMADol (ULTRAM) tablet 50 mg, Q6H PRN      No current facility-administered medications on file prior to encounter. Current Outpatient Medications on File Prior to Encounter   Medication Sig Dispense Refill    atorvastatin (LIPITOR) 80 MG tablet Take 80 mg by mouth nightly      bumetanide (BUMEX) 2 MG tablet Take 2 mg by mouth three times a week *SUN-TUE-THUR*      isosorbide mononitrate (IMDUR) 30 MG extended release tablet Take 30 mg by mouth daily      insulin glargine (LANTUS) 100 UNIT/ML injection vial Inject 11 Units into the skin nightly      hydrALAZINE (APRESOLINE) 10 MG tablet Take 1 tablet by mouth 2 times daily 180 tablet 1    metoprolol succinate (TOPROL XL) 25 MG extended release tablet Take 1 tablet by mouth daily 90 tablet 1    gabapentin (NEURONTIN) 100 MG capsule Take 1 capsule by mouth 3 times daily for 180 days.  Intended supply: 30 days 90 capsule 5    lanthanum (FOSRENOL) 1000 MG chewable tablet Take 1,000 mg by mouth 3 times daily (with meals)       allopurinol (ZYLOPRIM) 100 MG tablet Take 1 tablet by mouth daily 90 tablet 1    ticagrelor (BRILINTA) 90 MG TABS tablet Take 1 tablet by mouth 2 times daily 180 tablet 1    aspirin 81 MG EC tablet Take 1 tablet by mouth daily 30 tablet 3    B Complex-C-Folic Acid (BONI CAPS) 1 MG CAPS Take 1 mg by mouth nightly       LUMIGAN 0.01 % SOLN ophthalmic drops Place 1 drop into the right eye nightly       COMBIGAN 0.2-0.5 % ophthalmic solution Place 1 drop into the right eye every 12 hours   7    lidocaine-prilocaine (EMLA) 2.5-2.5 % cream Apply topically See Admin Instructions APPLY SMALL AMOUNT TO ACCESS SITE (AVF) 30-45 MINUTES BEFORE DIALYSIS. COVER WITH OCCLUSIVE DRESSING ( SARAN WRAP)      midodrine (PROAMATINE) 5 MG tablet Take 1 tablet by mouth as needed (may repeat x1 durring HD for assymptomatic SBP<100) 90 tablet 0    omeprazole (PRILOSEC) 20 MG delayed release capsule Take 20 mg by mouth daily as needed (GI DISCOMFORT)         Physical exam:  Vitals:    03/13/22 1930   BP: (!) 129/56   Pulse: 70   Resp: 16   Temp: 98.3 °F (36.8 °C)   SpO2: 95%     Wt Readings from Last 3 Encounters:   03/13/22 189 lb 9.5 oz (86 kg)   03/09/22 184 lb 1.4 oz (83.5 kg)   01/20/22 181 lb (82.1 kg)     Temp Readings from Last 3 Encounters:   03/13/22 98.3 °F (36.8 °C) (Temporal)   03/09/22 98.2 °F (36.8 °C)   02/28/22 98.1 °F (36.7 °C)     BP Readings from Last 3 Encounters:   03/13/22 (!) 129/56   03/09/22 102/60   01/20/22 (!) 165/74     Pulse Readings from Last 3 Encounters:   03/13/22 70   03/09/22 74   01/20/22 62         General appearance: In no acute distress  Skin: No rashes or lesions on exposed skin  Neck: No JVD  Lungs: Clear, no adventitious sounds  Heart: RRR, no rub  Abdomen: Soft, non-tender, + bowel sounds  Extremities: RUE chronic edema s/p breast CA, left arm AVF  Neurologic: Mental status: Alert, oriented    I/O last 3 completed shifts: In: 180 [P.O.:180]  Out: -   No intake/output data recorded.     Data:   Labs:  Lab Results   Component Value Date     03/13/2022     03/12/2022     03/11/2022    K 5.4 (H) 03/13/2022    K 5.4 (H) 03/13/2022    K 4.8 03/12/2022    K 4.8 03/12/2022    CL 97 (L) 03/13/2022    CO2 26 03/13/2022    CO2 27 03/12/2022    CO2 27 03/11/2022    CREATININE 6.2 (H) 03/13/2022    CREATININE 4.7 (H) 03/12/2022    CREATININE 6.2 (H) 03/11/2022    BUN 40 (H) 03/13/2022    BUN 28 (H) 03/12/2022    BUN 38 (H) 03/11/2022    GLUCOSE 116 (H) 03/13/2022    GLUCOSE 119 (H) 03/12/2022    GLUCOSE 100 (H) 03/11/2022    PHOS 5.8 (H) 03/11/2022    PHOS 3.1 05/25/2021    PHOS 4.1 05/24/2021    WBC 7.4 03/13/2022    WBC 6.5 03/12/2022    WBC 6.9 03/11/2022    HGB 10.8 (L) 03/13/2022    HGB 10.7 (L) 03/12/2022    HGB 10.8 (L) 03/11/2022    HCT 34.8 03/13/2022    HCT 35.0 03/12/2022    HCT 35.0 03/11/2022    MCV 92.6 03/13/2022     03/13/2022     Lab Results   Component Value Date     03/13/2022    K 5.4 03/13/2022    K 5.4 03/13/2022    CL 97 03/13/2022    CO2 26 03/13/2022    BUN 40 03/13/2022    CREATININE 6.2 03/13/2022    GLUCOSE 116 03/13/2022    GLUCOSE 46 04/02/2012    CALCIUM 9.7 03/13/2022       Recent Labs     03/13/22  0556 03/12/22  0556 03/11/22  0657   WBC 7.4 6.5 6.9   HGB 10.8* 10.7* 10.8*   HCT 34.8 35.0 35.0   MCV 92.6 93.1 93.3    190 189      Lab Results   Component Value Date    COLORU Yellow 11/07/2017    NITRU Negative 11/07/2017    GLUCOSEU Negative 11/07/2017    GLUCOSEU NEGATIVE 08/27/2011    KETUA Negative 11/07/2017    UROBILINOGEN 0.2 11/07/2017    BILIRUBINUR Negative 11/07/2017    BILIRUBINUR NEGATIVE 08/27/2011      Lab Results   Component Value Date    CALCIUM 9.7 03/13/2022    PHOS 5.8 (H) 03/11/2022      Lab Results   Component Value Date    IRON 33 (L) 11/09/2017    TIBC 222 (L) 11/09/2017    FERRITIN 334 11/09/2017     Imaging:  No results found. No results found for this or any previous visit. Assessment/ Plans:    1.  ESRD on HD  OP [facility] MWF   left AV fistula  Continue HD MWF    2.  Back pain persists  S/p fall 3/8  CT lumbar spine 3/8 negative for fracture, severe central canal stenosis  High risk for surgery per Cardiology   S/p epidural catheter insertion/ nerve block 3/12; to have additional dose today  Neurosurgery following    3. Hypertension with ESRD  BP goal< 140/90  BP at goal  Follow on current regimen/with HD    4. Secondary hyperparathyroidism of renal origin  Lab work from 3/11 showed , Phos 5.8, vitamin D 28  Continue phos binder   Low phos diet   Continue vit d supplement   Follow    5. Hyperkalemia  Likely in setting of ESRD  Potassium today 5.3  Low potassium diet  Adjust potassium prescription  Follow on HD    6. Anemia with ESRD  Hgb is at target of 10-12  Follow    Mars Marroquin, APRN - CNS     Patient seen and examined all key components of the physical performed independently , case discussed with NP, all pertinent labs and radiologic tests personally reviewed agree with above. Blanca Lesch, MD      Department of Internal Medicine  Section of Nephrology  Dialysis Note        PROCEDURE:  Patient seen on hemodialysis at 11:31 AM    PHYSICIAN:  Renita Weston M.D., [facility]    INDICATION:  End-stage renal disease    RX:  See dialysis flowsheet for specifics on access, blood flow rate, dialysate baths, duration of dialysis, anticoagulation and other technical information.     COMMENTS:  Procedure in progress and tolerated       Blanca Lesch, MD, MD

## 2022-03-14 NOTE — TELEPHONE ENCOUNTER
Dale De La Fuente pts son stated pt was to see dr Linda Tejada tomorrow to discuss surgery. Pt fell and is inpatient due to back injury. He wants to know what  advises now?  Dale De La Fuente [phone]

## 2022-03-14 NOTE — CARE COORDINATION
Dr Gonzales Able up to see the patient. He asked that this CM placed message out to cardiology to speak with the patient and her brother Hernandez Manuel re: her heart and further cardiac work up. Perfect serve message sent to Irais Cruz NP with cardiology who saw the patient with Dr Gaudencio Contreras next week. Will continue to follow.       Loni Romero RN.  [facility]  [phone]

## 2022-03-15 PROBLEM — M54.59 INTRACTABLE LOW BACK PAIN: Status: ACTIVE | Noted: 2022-03-15

## 2022-03-15 LAB
ALBUMIN SERPL-MCNC: 3 G/DL (ref 3.5–5.2)
ALP BLD-CCNC: 68 U/L (ref 35–104)
ALT SERPL-CCNC: 19 U/L (ref 0–32)
ANION GAP SERPL CALCULATED.3IONS-SCNC: 14 MMOL/L (ref 7–16)
AST SERPL-CCNC: 21 U/L (ref 0–31)
BASOPHILS ABSOLUTE: 0.03 E9/L (ref 0–0.2)
BASOPHILS RELATIVE PERCENT: 0.4 % (ref 0–2)
BILIRUB SERPL-MCNC: 0.5 MG/DL (ref 0–1.2)
BUN BLDV-MCNC: 31 MG/DL (ref 6–23)
CALCIUM SERPL-MCNC: 9.7 MG/DL (ref 8.6–10.2)
CHLORIDE BLD-SCNC: 96 MMOL/L (ref 98–107)
CO2: 26 MMOL/L (ref 22–29)
CREAT SERPL-MCNC: 5.1 MG/DL (ref 0.5–1)
EOSINOPHILS ABSOLUTE: 0.22 E9/L (ref 0.05–0.5)
EOSINOPHILS RELATIVE PERCENT: 3.1 % (ref 0–6)
GFR AFRICAN AMERICAN: 10
GFR NON-AFRICAN AMERICAN: 10 ML/MIN/1.73
GLUCOSE BLD-MCNC: 118 MG/DL (ref 74–99)
HCT VFR BLD CALC: 33.5 % (ref 34–48)
HEMOGLOBIN: 10.2 G/DL (ref 11.5–15.5)
IMMATURE GRANULOCYTES #: 0.04 E9/L
IMMATURE GRANULOCYTES %: 0.6 % (ref 0–5)
LYMPHOCYTES ABSOLUTE: 1.12 E9/L (ref 1.5–4)
LYMPHOCYTES RELATIVE PERCENT: 16 % (ref 20–42)
MCH RBC QN AUTO: 28.2 PG (ref 26–35)
MCHC RBC AUTO-ENTMCNC: 30.4 % (ref 32–34.5)
MCV RBC AUTO: 92.5 FL (ref 80–99.9)
METER GLUCOSE: 108 MG/DL (ref 74–99)
METER GLUCOSE: 181 MG/DL (ref 74–99)
METER GLUCOSE: 197 MG/DL (ref 74–99)
METER GLUCOSE: 323 MG/DL (ref 74–99)
MONOCYTES ABSOLUTE: 1.11 E9/L (ref 0.1–0.95)
MONOCYTES RELATIVE PERCENT: 15.8 % (ref 2–12)
NEUTROPHILS ABSOLUTE: 4.49 E9/L (ref 1.8–7.3)
NEUTROPHILS RELATIVE PERCENT: 64.1 % (ref 43–80)
PDW BLD-RTO: 16 FL (ref 11.5–15)
PLATELET # BLD: 225 E9/L (ref 130–450)
PMV BLD AUTO: 10.8 FL (ref 7–12)
POTASSIUM REFLEX MAGNESIUM: 5.3 MMOL/L (ref 3.5–5)
POTASSIUM SERPL-SCNC: 5.3 MMOL/L (ref 3.5–5)
RBC # BLD: 3.62 E12/L (ref 3.5–5.5)
SODIUM BLD-SCNC: 136 MMOL/L (ref 132–146)
TOTAL PROTEIN: 6.2 G/DL (ref 6.4–8.3)
WBC # BLD: 7 E9/L (ref 4.5–11.5)

## 2022-03-15 PROCEDURE — 2580000003 HC RX 258: Performed by: INTERNAL MEDICINE

## 2022-03-15 PROCEDURE — 1200000000 HC SEMI PRIVATE

## 2022-03-15 PROCEDURE — 97530 THERAPEUTIC ACTIVITIES: CPT

## 2022-03-15 PROCEDURE — 6370000000 HC RX 637 (ALT 250 FOR IP): Performed by: INTERNAL MEDICINE

## 2022-03-15 PROCEDURE — 97535 SELF CARE MNGMENT TRAINING: CPT

## 2022-03-15 PROCEDURE — 80048 BASIC METABOLIC PNL TOTAL CA: CPT

## 2022-03-15 PROCEDURE — 36415 COLL VENOUS BLD VENIPUNCTURE: CPT

## 2022-03-15 PROCEDURE — 99221 1ST HOSP IP/OBS SF/LOW 40: CPT | Performed by: STUDENT IN AN ORGANIZED HEALTH CARE EDUCATION/TRAINING PROGRAM

## 2022-03-15 PROCEDURE — 82962 GLUCOSE BLOOD TEST: CPT

## 2022-03-15 PROCEDURE — 85025 COMPLETE CBC W/AUTO DIFF WBC: CPT

## 2022-03-15 PROCEDURE — 2700000000 HC OXYGEN THERAPY PER DAY

## 2022-03-15 PROCEDURE — 6370000000 HC RX 637 (ALT 250 FOR IP)

## 2022-03-15 PROCEDURE — 80053 COMPREHEN METABOLIC PANEL: CPT

## 2022-03-15 RX ORDER — LATANOPROST 50 UG/ML
1 SOLUTION/ DROPS OPHTHALMIC NIGHTLY
Status: DISCONTINUED | OUTPATIENT
Start: 2022-03-15 | End: 2022-03-17 | Stop reason: HOSPADM

## 2022-03-15 RX ORDER — LACTULOSE 10 G/15ML
20 SOLUTION ORAL 3 TIMES DAILY
Status: DISPENSED | OUTPATIENT
Start: 2022-03-15 | End: 2022-03-16

## 2022-03-15 RX ORDER — ASPIRIN 81 MG/1
81 TABLET, CHEWABLE ORAL DAILY
Status: DISCONTINUED | OUTPATIENT
Start: 2022-03-16 | End: 2022-03-17 | Stop reason: HOSPADM

## 2022-03-15 RX ADMIN — LANTHANUM CARBONATE 1000 MG: 500 TABLET, CHEWABLE ORAL at 18:06

## 2022-03-15 RX ADMIN — LACTULOSE 20 G: 20 SOLUTION ORAL at 21:15

## 2022-03-15 RX ADMIN — SODIUM ZIRCONIUM CYCLOSILICATE 10 G: 10 POWDER, FOR SUSPENSION ORAL at 12:04

## 2022-03-15 RX ADMIN — Medication 1000 UNITS: at 12:04

## 2022-03-15 RX ADMIN — Medication 10 ML: at 21:26

## 2022-03-15 RX ADMIN — INSULIN LISPRO 1 UNITS: 100 INJECTION, SOLUTION INTRAVENOUS; SUBCUTANEOUS at 21:16

## 2022-03-15 RX ADMIN — ATORVASTATIN CALCIUM 80 MG: 40 TABLET, FILM COATED ORAL at 21:15

## 2022-03-15 RX ADMIN — INSULIN LISPRO 4 UNITS: 100 INJECTION, SOLUTION INTRAVENOUS; SUBCUTANEOUS at 18:06

## 2022-03-15 RX ADMIN — INSULIN LISPRO 1 UNITS: 100 INJECTION, SOLUTION INTRAVENOUS; SUBCUTANEOUS at 12:41

## 2022-03-15 RX ADMIN — HYDROCODONE BITARTRATE AND ACETAMINOPHEN 1 TABLET: 5; 325 TABLET ORAL at 21:15

## 2022-03-15 RX ADMIN — Medication 10 ML: at 09:29

## 2022-03-15 RX ADMIN — PANTOPRAZOLE SODIUM 40 MG: 40 TABLET, DELAYED RELEASE ORAL at 05:57

## 2022-03-15 RX ADMIN — POLYETHYLENE GLYCOL 3350 17 G: 17 POWDER, FOR SOLUTION ORAL at 12:04

## 2022-03-15 RX ADMIN — METOPROLOL SUCCINATE 25 MG: 25 TABLET, EXTENDED RELEASE ORAL at 09:29

## 2022-03-15 RX ADMIN — GABAPENTIN 100 MG: 100 CAPSULE ORAL at 14:24

## 2022-03-15 RX ADMIN — ALLOPURINOL 100 MG: 100 TABLET ORAL at 11:03

## 2022-03-15 RX ADMIN — GABAPENTIN 100 MG: 100 CAPSULE ORAL at 21:15

## 2022-03-15 RX ADMIN — GABAPENTIN 100 MG: 100 CAPSULE ORAL at 09:29

## 2022-03-15 ASSESSMENT — PAIN DESCRIPTION - DIRECTION: RADIATING_TOWARDS: TO ABDOMEN

## 2022-03-15 ASSESSMENT — PAIN DESCRIPTION - LOCATION: LOCATION: BACK

## 2022-03-15 ASSESSMENT — PAIN SCALES - GENERAL
PAINLEVEL_OUTOF10: 7
PAINLEVEL_OUTOF10: 7

## 2022-03-15 ASSESSMENT — PAIN DESCRIPTION - ONSET: ONSET: ON-GOING

## 2022-03-15 ASSESSMENT — PAIN DESCRIPTION - ORIENTATION: ORIENTATION: LOWER

## 2022-03-15 ASSESSMENT — PAIN SCALES - WONG BAKER: WONGBAKER_NUMERICALRESPONSE: 0

## 2022-03-15 ASSESSMENT — PAIN DESCRIPTION - FREQUENCY: FREQUENCY: CONTINUOUS

## 2022-03-15 ASSESSMENT — PAIN DESCRIPTION - DESCRIPTORS: DESCRIPTORS: ACHING;DISCOMFORT;RADIATING

## 2022-03-15 ASSESSMENT — PAIN DESCRIPTION - PAIN TYPE: TYPE: CHRONIC PAIN

## 2022-03-15 ASSESSMENT — PAIN - FUNCTIONAL ASSESSMENT: PAIN_FUNCTIONAL_ASSESSMENT: PREVENTS OR INTERFERES SOME ACTIVE ACTIVITIES AND ADLS

## 2022-03-15 ASSESSMENT — PAIN DESCRIPTION - PROGRESSION: CLINICAL_PROGRESSION: GRADUALLY IMPROVING

## 2022-03-15 NOTE — PROGRESS NOTES
Hospitalist Progress Note      SYNOPSIS: Patient admitted on 3/9/2022 for low back pain. This is a 61-year-old black female who was transferred from [facility] because of back pain. Symptoms started few days ago when she took a step and fell on the ground. She denies lower extremity weakness. She denies chest pain, no hematuria dysuria hematemesis or hematochezia. She denies subjective fever or chills. Patient has a history of discectomy and a spinal cord stimulator placed for pain by Dr. Jennifer Marvin. At the time of examination patient continues to complain of back pain without weakness. After prolonged discussion between family and neurosurgery, patient decided to have epidural nerve block. A temporary lumbar epidural catheter inserted using fluoroscopy on 3/12/2022. Patient received her epidural shot on 3/14 and epidural catheter was discontinued afterwards. SUBJECTIVE:    Patient seen and examined in her room. Feels slightly better with respect to her back pain. Denies any chest pain, nausea, vomiting, shortness of breath. Records reviewed. Stable overnight. No other overnight issues reported. Temp (24hrs), Av.2 °F (36.2 °C), Min:96.9 °F (36.1 °C), Max:97.9 °F (36.6 °C)    DIET: ADULT DIET; Regular; Low Fat/Low Chol/High Fiber/2 gm Na; Low Potassium (Less than 3000 mg/day); Low Phosphorus (Less than 1000 mg)  CODE: Full Code    Intake/Output Summary (Last 24 hours) at 3/15/2022 0814  Last data filed at 3/14/2022 1550  Gross per 24 hour   Intake 420 ml   Output 2300 ml   Net -1880 ml       OBJECTIVE:    BP (!) 108/55   Pulse 64   Temp 97 °F (36.1 °C) (Temporal)   Resp 16   Ht 5' 10\" (1.778 m)   Wt 179 lb 14.3 oz (81.6 kg)   SpO2 96%   BMI 25.81 kg/m²     General appearance: No apparent distress, appears stated age and cooperative. HEENT:  Conjunctivae/corneas clear. Neck: Supple. No jugular venous distention.    Respiratory: Clear to auscultation bilaterally, normal respiratory effort  Cardiovascular: Regular rate rhythm, normal S1-S2  Abdomen: Soft, nontender, nondistended  Musculoskeletal: No clubbing, cyanosis, no bilateral lower extremity edema. Brisk capillary refill. Skin:  No rashes  on visible skin  Neurologic: awake, alert and following commands     ASSESSMENT & PLAN:      1. Back pain with spinal stenosis: Cervical and lumbar spine CT consistent with  Severe central canal stenosis at L2-3.  Moderate stenosis at L1-2.   5.  Multilevel neural foraminal stenoses, worst (severe) at L2-3.  Moderate   to severe stenoses at L1-2.    Pain control as indicated. Neurosurgery consultation  Status post epidural nerve block on 3/14     2. End-stage renal disease on hemodialysis: Monday Wednesday Friday, consult nephrology for renal replacement therapy. Dialysis as per nephrology     3. Chronic anemia secondary to end-stage renal disease: Monitor H&H, continue JAYSHREE.     4.  Essential hypertension: Continue outpatient medications with hydralazine and metoprolol.     5. Hyperlipidemia: Continue statin     6. 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  Gout without acute attack: Continue allopurinol    8.   History of coronary artery disease post PCI  Also have mitral valve papillary fibroblastoma  Patient will require future CABG x2 with resection of the MV fibroblastoma  Family requested more information regarding follow-up of cardiology care, requested cardiology team to talk with the patient     Diet: Diet NPO  Code Status: Full Code        DISPOSITION:   Unclear discharge disposition at the moment    Medications:  REVIEWED DAILY    Infusion Medications    sodium chloride       Scheduled Medications    methylPREDNISolone acetate  80 mg Epidural Once    Vitamin D  1,000 Units Oral Daily    sodium chloride flush  5-40 mL IntraVENous 2 times per day    insulin lispro  0-6 Units SubCUTAneous TID     insulin lispro  0-3 Units SubCUTAneous Nightly    allopurinol  100 mg Oral Daily    atorvastatin  80 mg Oral Nightly    gabapentin  100 mg Oral TID    hydrALAZINE  10 mg Oral BID    isosorbide mononitrate  30 mg Oral Daily    lanthanum  1,000 mg Oral TID WC    metoprolol succinate  25 mg Oral Daily    pantoprazole  40 mg Oral QAM AC     PRN Meds: naloxone, diphenhydrAMINE, sodium chloride flush, sodium chloride, ondansetron **OR** ondansetron, polyethylene glycol, acetaminophen **OR** acetaminophen, HYDROcodone 5 mg - acetaminophen, midodrine, traMADol    Labs:     Recent Labs     03/13/22  0556 03/14/22  0720 03/15/22  0629   WBC 7.4 8.5 7.0   HGB 10.8* 10.7* 10.2*   HCT 34.8 34.4 33.5*    240 225       Recent Labs     03/13/22  0556 03/14/22  0720    137   K 5.4*  5.4* 5.3*  5.3*   CL 97* 96*   CO2 26 27   BUN 40* 51*   CREATININE 6.2* 7.6*   CALCIUM 9.7 10.3*       Recent Labs     03/13/22  0556 03/14/22  0720   PROT 6.3* 6.7   ALKPHOS 68 74   ALT 18 21   AST 21 25   BILITOT 0.5 0.5       No results for input(s): INR in the last 72 hours. No results for input(s): Ghazal Irma in the last 72 hours. Chronic labs:    Lab Results   Component Value Date    CHOL 101 05/19/2021    TRIG 106 05/19/2021    HDL 38 05/19/2021    LDLCALC 42 05/19/2021    TSH 2.810 05/19/2021    INR 1.0 01/20/2022    LABA1C 5.7 09/07/2021       Radiology: REVIEWED DAILY    +++++++++++++++++++++++++++++++++++++++++++++++++  Mary White MD  [facility] - [address] [facility], [place]  +++++++++++++++++++++++++++++++++++++++++++++++++  NOTE: This report was transcribed using voice recognition software. Every effort was made to ensure accuracy; however, inadvertent computerized transcription errors may be present.

## 2022-03-15 NOTE — CARE COORDINATION
3/15/22 Update CM note: Per Todd Cuellar, liaison at [facility], she will begin precert with insurance company today.  Electronically signed by Ernie Draper RN CM on 3/15/2022 at 11:00 AM

## 2022-03-15 NOTE — PROGRESS NOTES
[facility]  Nephrology Progress Note  Patient's Name: Dorie Thomas    History of Present Illness from 3/10 Consult Note:  \"Claudia Mccoy is a 78 y.o. female with a past medical history of breast CA, anemia, hypertension, hyperlipidemia, and glaucoma. She presented to [facility] ED on 3/8 following a fall. Per the ED provider note the patient had back pain/had fell. She was dialyzed on 3/9 at [facility]. Patient was transferred to [address] [place] [address], as she can see neurosurgery here. Lab data today includes potassium 4.7, BUN 26, creatinine 4.7, and hemoglobin 11.7. We were consulted to see the patient for ESRD on HD MWF. Patient is known to our service and dialyzes at [address] MWF via a left AV fistula. At present, patient was seen and examined. She has family at the bedside. She reports that she had a fall after feeling weakness and pain in her back, on Tuesday morning. She explained that she was unable to ambulate at that time. She denies any current chest pain or shortness of breath. No abdominal pain, nausea, or vomiting. She denied any dizziness. \"    Subjective:    3/15: Patient was seen and examined. She reports that she feels \"better. \"  She is currently sitting up in a chair. She tells me that she ambulated to the door. She explained that her back is still sore. She denies any chest pain or shortness of breath. No abdominal pain or nausea. She is eating and drinking well.     Allergies:  Furosemide    Current Medications:    methylPREDNISolone acetate (DEPO-MEDROL) injection 80 mg, Once  naloxone (NARCAN) injection 0.4 mg, PRN  diphenhydrAMINE (BENADRYL) injection 25 mg, Q6H PRN  Vitamin D (CHOLECALCIFEROL) tablet 1,000 Units, Daily  sodium chloride flush 0.9 % injection 5-40 mL, 2 times per day  sodium chloride flush 0.9 % injection 5-40 mL, PRN  0.9 % sodium chloride infusion, PRN  ondansetron (ZOFRAN-ODT) disintegrating tablet 4 mg, Q8H PRN   Or  ondansetron (ZOFRAN) injection 4 mg, Q6H PRN  polyethylene glycol (GLYCOLAX) packet 17 g, Daily PRN  acetaminophen (TYLENOL) tablet 650 mg, Q6H PRN   Or  acetaminophen (TYLENOL) suppository 650 mg, Q6H PRN  HYDROcodone-acetaminophen (NORCO) 5-325 MG per tablet 1 tablet, Q6H PRN  insulin lispro (HUMALOG) injection vial 0-6 Units, TID WC  insulin lispro (HUMALOG) injection vial 0-3 Units, Nightly  allopurinol (ZYLOPRIM) tablet 100 mg, Daily  atorvastatin (LIPITOR) tablet 80 mg, Nightly  gabapentin (NEURONTIN) capsule 100 mg, TID  hydrALAZINE (APRESOLINE) tablet 10 mg, BID  isosorbide mononitrate (IMDUR) extended release tablet 30 mg, Daily  lanthanum (FOSRENOL) chewable tablet 1,000 mg, TID WC  metoprolol succinate (TOPROL XL) extended release tablet 25 mg, Daily  midodrine (PROAMATINE) tablet 5 mg, PRN  pantoprazole (PROTONIX) tablet 40 mg, QAM AC  traMADol (ULTRAM) tablet 50 mg, Q6H PRN      No current facility-administered medications on file prior to encounter. Current Outpatient Medications on File Prior to Encounter   Medication Sig Dispense Refill    atorvastatin (LIPITOR) 80 MG tablet Take 80 mg by mouth nightly      bumetanide (BUMEX) 2 MG tablet Take 2 mg by mouth three times a week *SUN-TUE-THUR*      isosorbide mononitrate (IMDUR) 30 MG extended release tablet Take 30 mg by mouth daily      insulin glargine (LANTUS) 100 UNIT/ML injection vial Inject 11 Units into the skin nightly      hydrALAZINE (APRESOLINE) 10 MG tablet Take 1 tablet by mouth 2 times daily 180 tablet 1    metoprolol succinate (TOPROL XL) 25 MG extended release tablet Take 1 tablet by mouth daily 90 tablet 1    gabapentin (NEURONTIN) 100 MG capsule Take 1 capsule by mouth 3 times daily for 180 days.  Intended supply: 30 days 90 capsule 5    lanthanum (FOSRENOL) 1000 MG chewable tablet Take 1,000 mg by mouth 3 times daily (with meals)       allopurinol (ZYLOPRIM) 100 MG tablet Take 1 tablet by mouth daily 90 tablet 1  ticagrelor (BRILINTA) 90 MG TABS tablet Take 1 tablet by mouth 2 times daily 180 tablet 1    aspirin 81 MG EC tablet Take 1 tablet by mouth daily 30 tablet 3    B Complex-C-Folic Acid (BONI CAPS) 1 MG CAPS Take 1 mg by mouth nightly       LUMIGAN 0.01 % SOLN ophthalmic drops Place 1 drop into the right eye nightly       COMBIGAN 0.2-0.5 % ophthalmic solution Place 1 drop into the right eye every 12 hours   7    lidocaine-prilocaine (EMLA) 2.5-2.5 % cream Apply topically See Admin Instructions APPLY SMALL AMOUNT TO ACCESS SITE (AVF) 30-45 MINUTES BEFORE DIALYSIS. COVER WITH OCCLUSIVE DRESSING ( SARAN WRAP)      midodrine (PROAMATINE) 5 MG tablet Take 1 tablet by mouth as needed (may repeat x1 durring HD for assymptomatic SBP<100) 90 tablet 0    omeprazole (PRILOSEC) 20 MG delayed release capsule Take 20 mg by mouth daily as needed (GI DISCOMFORT)         Physical exam:  Vitals:    03/15/22 0830   BP: 93/62   Pulse: 76   Resp: 16   Temp: 98.1 °F (36.7 °C)   SpO2: 95%     Wt Readings from Last 3 Encounters:   03/14/22 179 lb 14.3 oz (81.6 kg)   03/09/22 184 lb 1.4 oz (83.5 kg)   01/20/22 181 lb (82.1 kg)     Temp Readings from Last 3 Encounters:   03/15/22 98.1 °F (36.7 °C) (Temporal)   03/09/22 98.2 °F (36.8 °C)   02/28/22 98.1 °F (36.7 °C)     BP Readings from Last 3 Encounters:   03/15/22 93/62   03/09/22 102/60   01/20/22 (!) 165/74     Pulse Readings from Last 3 Encounters:   03/15/22 76   03/09/22 74   01/20/22 62       General appearance: Awake, alert, no acute distress  Skin: Warm/dry; no rashes on exposed extremities  Neck: No JVD noted  Lungs: Clear bilaterally; no adventitious lung sounds; unlabored  Heart: Regular rate and rhythm. No rub  Abdomen: Soft, non-tender, nondistended. Active bowel sounds  Extremities: RUE edema history of breast CA, left arm AVF  Neurologic: Awake, alert, answers questions appropriately    I/O last 3 completed shifts:   In: 5 [P.O.:120]  Out: 2300   I/O this previous visit. Assessment/ Plans:    1. ESRD on HD  OP [facility] MWF   left AV fistula  Continue HD MWF    2.  Back pain persists  S/p fall 3/8  CT lumbar spine 3/8 negative for fracture, severe central canal stenosis  High risk for surgery per Cardiology   S/p epidural catheter insertion/ nerve block 3/12  Neurosurgery following    3. Hypertension with ESRD  BP goal< 140/90  BP at goal  Follow on current regimen/with HD    4. Secondary hyperparathyroidism of renal origin  Lab work from 3/11 showed , Phos 5.8, vitamin D 28  Continue phos binder   Low phos diet   Continue vit d supplement   Follow    5. Hyperkalemia  Likely in setting of ESRD  Potassium today 5.3  Low potassium diet  Dose lokelma today   Follow with HD    6. Anemia   Hgb target of 10-12  Hemoglobin 10.2  JAYSHREE if hemoglobin<10  Transfuse for hemoglobin<7  Follow    Viky Edwards APRN - CNP     Patient seen and examined all key components of the physical performed independently , case discussed with NP, all pertinent labs and radiologic tests personally reviewed agree with above.       Enolia Cheadle, MD

## 2022-03-15 NOTE — PROGRESS NOTES
Physical Therapy  Physical Therapy Treatment     Name: Caryle Sicks  : 1955  MRN: [id number]      Date of Service: 3/15/2022    Evaluating PT:  Hung Bee PT, DPT [id number]    Room #:  6449/3000-E  Diagnosis:  Lumbar stenosis without neurogenic claudication [M48.061]  Lumbar foraminal stenosis [M48.061]  Back pain [M54.9]  PMHx/PSHx:  Acute infection of bone, anemia, arthritis, CAD, carpal tunnel syndrome, CAD, CHF, CKD, diabetic retinopathy, glaucoma, HD patient, HTN, HLD, DM, vitreous hemorrhage, ventricular hypertrophy   Procedure/Surgery:  Temporary lumbar epidural placed via fluoroscopy 3/12  Precautions:  Falls  Equipment Needs:  FWW    SUBJECTIVE:    Pt lives alone in a 1 story home with no stairs to enter and no rail. Bed is on 1st floor and bath is on 1st floor. Pt ambulated with cane in home and [employer] in community PTA. OBJECTIVE:   Initial Evaluation  Date: 3/13/22 Treatment  3/15 Short Term/ Long Term   Goals   AM-PAC 6 Clicks 98/65 80/84    Was pt agreeable to Eval/treatment? yes Yes     Does pt have pain? Significant pain during rest and mobility, does not rate Back pain, moderate     Bed Mobility  Rolling: ModA  Supine to sit: ModA  Sit to supine: ModA  Scooting: ModA Rolling: NT  Supine to sit: NT  Sit to supine: NT  Scooting: NT Rolling: Independent   Supine to sit: Independent   Sit to supine: Independent   Scooting: Independent    Transfers Sit to stand: ModA  Stand to sit: ModA  Stand pivot: NT Sit to stand: MaxA  Stand to sit: MaxA  Stand pivot: NT Sit to stand: Independent   Stand to sit: Independent   Stand pivot:  Mod I with [employer]   Ambulation    NT, limited by pain 10 ft with [employer] Mod<>MaxA 50 feet with [employer] Mod I   Stair negotiation: ascended and descended  NT NT TBD   ROM BUE:  Defer to OT note  BLE:  WFL     Strength BUE:  Defer to OT note  BLE:  Grossly 3/5  WFL   Balance Sitting EOB:  SBA<>Min  Dynamic Standing:  ModA to [employer] Sitting Carlos Alberto  Standing MaxA Sitting EOB:  Independent Dynamic Standing: Mod I with [employer]     Pt is A & O x 4  Sensation:  Pt denies numbness and tingling to extremities  Edema:  None noted    Vitals:  SPO2 monitored on .5 L: 88-90%  On 1 L: 91-93%    Patient education  Pt educated on role of PT, safety during mobility    Patient response to education:   Pt verbalized understanding Pt demonstrated skill Pt requires further education in this area   yes yes yes     ASSESSMENT:    Conditions Requiring Skilled Therapeutic Intervention:    [x]Decreased strength     [x]Decreased ROM  [x]Decreased functional mobility  [x]Decreased balance   [x]Decreased endurance   [x]Decreased posture  []Decreased sensation  []Decreased coordination   []Decreased vision  []Decreased safety awareness   [x]Increased pain       Comments:  Patient long sitting edge of bed upon entry and agreeable to PT session  Pt requires heavy assist for all mobility. She shows heavy posterior leaning and keeps feet anterior to COG. Balance heavily fluctuates. Pivot and ambulation both completed with use of walker and heavy assist for balance. NBOS demonstrated which was corrected with cues. Sitting in chair to end session    Treatment:  Patient practiced and was instructed in the following treatment:     Bed Mobility: VCs provided for sequencing and safety during mobility. Manual assist provided for completion of task.  Transfer Training: Verbal and tactile cueing provided for sequencing and safety during mobility.  Manual assist provided for completion of task    Therapeutic Activities:  Patient sat EOB for approximately 8 minutes during session with occasional assist for sitting balance      PHYSICAL THERAPY PLAN OF CARE:    PT POC is established based on physician order and patient diagnosis     Referring provider/PT Order:    03/10/22 0300  PT eval and treat  Start:  03/10/22 0300,   End:  03/10/22 0300,   ONE TIME,   Standing Count:  1 Occurrences,   R        Order went unreviewed at transfer on u Mar 10, 2022  6:58 AM    Cami Christianson MD       Diagnosis:  Lumbar stenosis without neurogenic claudication [M48.061]  Lumbar foraminal stenosis [M48.061]  Back pain [M54.9]  Specific instructions for next treatment:  Progress mobility as appropriate    Current Treatment Recommendations:     [x] Strengthening to improve independence with functional mobility   [x] ROM to improve independence with functional mobility   [x] Balance Training to improve static/dynamic balance and to reduce fall risk  [x] Endurance Training to improve activity tolerance during functional mobility   [x] Transfer Training to improve safety and independence with all functional transfers   [x] Gait Training to improve gait mechanics, endurance and assess need for appropriate assistive device  [] Stair Training in preparation for safe discharge home and/or into the community   [] Positioning to prevent skin breakdown and contractures  [x] Safety and Education Training   [x] Patient/Caregiver Education   [x] HEP  [x] Other     PT long term treatment goals are located in above grid    Frequency of treatments: 2-5x/week x 1-2 weeks. Time in  0900  Time out  0915    Total Treatment Time  15 minutes     Evaluation Time includes thorough review of current medical information, gathering information on past medical history/social history and prior level of function, completion of standardized testing/informal observation of tasks, assessment of data and education on plan of care and goals.     CPT codes:  [] Low Complexity PT evaluation 43363  [] Moderate Complexity PT evaluation 38751  [] High Complexity PT evaluation 66165  [] PT Re-evaluation 63264  [] Gait training 18877 - minutes  [] Manual therapy 60484 - minutes  [x] Therapeutic activities 24174 15 minutes  [] Therapeutic exercises 24985 - minutes  [] Neuromuscular reeducation 69532 - minutes     Yessi Moody, PT, DPT  [id number]

## 2022-03-15 NOTE — PROGRESS NOTES
Messaged Kirsty Sat with Cardiology to ask about re-starting the [address]. Re-directed to Cardiology. Messaged Dr Elena Hood Dr stated that it is ok to resume Brilinta and Aspirin from Cardiology POV however confirm with neurosurgery. 1452: Called Dr Martin Ring and able to resume anticoagulants as of 03/16/22.

## 2022-03-15 NOTE — CARE COORDINATION
Call received from Kasi Chan, liaison with [facility] in follow-up. Unsure if they will have a bed today. She will know after their meeting and let us know ASAP if they will and can start pre-cert. Call also placed to [facility], liaison with [employer]. Detailed VM left with new referral information and number for return call. Explained patient is ready for transition of care and auth to be initiated. Therapy notified yesterday of need for updated notes this am.  Await return calls for acceptance and authorization to be started. Will continue to follow.      Arden Walker RN.  [facility]  [phone]

## 2022-03-15 NOTE — CONSULTS
CTS Consult    Patient name: Ronnie Ring    Reason for consult: preop CABG, MV mass resection    Referring Physician: Maritza Barrios MD    Primary Care Physician: Christiano Medina MD    Date of service: 3/15/2022    Chief Complaint: back pain  HPI: Ronnie Ring is a 73 y.o. female with complex medical history who was previously scheduled for surgery on 12/29/21 for CABG x 2 (SVG-RCA, SVG-LCx/OM) and excision on MV mass, poss MV repair. Prior to OR, she was in ED on 12/20 for finger abscess with I&D, she was placed on abx. Cardiac surgery was postponed to complete course of abx and re-evaluate finger. Eventually, she underwent amputation of the distal left third phalanx. She was scheduled to see me in office today for re-evaluation and scheduling for OR. However, she fell on 3/10 with resulting back pain. She was transferred from [facility] to [address] [place] [address] for evaluation. Neurosurgery was planned but cancelled secondary to her heart disease. Regarding her history, she was initially sent to my office by cardiology for evaluation of her fibroelastoma. Otherwise, PMH is significant for CAD (cath 5/2021 95% mid LAD,  circ, 90% mid RCA, 70% ostial PDA----> s/p MARYLOU mid LAD and mid RCA) and has been followed by cardiology.  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  LVEF of 50-55%. KIARA showed Structurally normal anterior mitral leaflet with echodensity attached tot the ventricular side of the posterior leaflet(1.0x 1.2cm) with restriction excursion, mild MR, mild TR. Repeat cardiac catheterization demonstrated progression of her coronary artery disease with 80% instent stenosis of RCA, occluded LCx. She is currently on brilinta.      Of note she has history of right breast CA, treated with mastectomy and chemo/rads, CKD on HD, CHF, HTN, DM, chronic anemia; she is jehovahs witness. Allergies:    Allergies Deng Simmons MD   100 mg at 03/14/22 1331    atorvastatin (LIPITOR) tablet 80 mg  80 mg Oral Nightly Deng Simmons MD   80 mg at 03/14/22 2020    gabapentin (NEURONTIN) capsule 100 mg  100 mg Oral TID Deng Simmons MD   100 mg at 03/14/22 2020    hydrALAZINE (APRESOLINE) tablet 10 mg  10 mg Oral BID Deng Simmons MD   10 mg at 03/14/22 1709    isosorbide mononitrate (IMDUR) extended release tablet 30 mg  30 mg Oral Daily Deng Simmons MD   30 mg at 03/14/22 1331    lanthanum (FOSRENOL) chewable tablet 1,000 mg  1,000 mg Oral TID WC Deng Simmons MD   1,000 mg at 03/14/22 1709    metoprolol succinate (TOPROL XL) extended release tablet 25 mg  25 mg Oral Daily Deng Simmons MD   25 mg at 03/14/22 1331    midodrine (PROAMATINE) tablet 5 mg  5 mg Oral PRN Deng Simmons MD        pantoprazole (PROTONIX) tablet 40 mg  40 mg Oral QAM AC Deng Simmons MD   40 mg at 03/15/22 0557    traMADol (ULTRAM) tablet 50 mg  50 mg Oral Q6H PRN Cyril Bolden MD   50 mg at 03/14/22 1430       Past Medical History:  Past Medical History:   Diagnosis Date    Acute infection of bone ([address]     infection of rt foot, resolved.     Acute osteomyelitis of phalanx of left hand ([address] 1/27/2022    Left third distal phalanx    Anemia of chronic disease     Arthritis     Breast cancer ([address]     right breast, 2008/ bladder, 2006- last chemotherapy \"years ago\"    CAD (coronary artery disease)     Carpal tunnel syndrome     bilat - for OR left hand 3-17-20     Chronic diastolic CHF (congestive heart failure) ([address] 09/23/2014 9/23/14- echocardiogram revealed moderate LV concentric hypertrophy, stage III diastolic dysfunction, mild tricuspid regurgitation    CKD (chronic kidney disease) stage 4, GFR 15-29 ml/min ([facility])     Diabetic retinopathy ([address]     Glaucoma     Hemodialysis patient ([address]     [facility]- Dr. Garza Fess - left arm fistula     Hyperkalemia, diminished renal excretion 11/9/2017    Hyperlipidemia     Hypertension     Hypoglycemia unawareness in type 1 diabetes mellitus ([address] 11/7/2017    Insulin dependent type 2 diabetes mellitus ([address]     Neuropathy     feet    Osteomyelitis due to secondary diabetes ([address]     rt great toe with amputation    Patient is Cheondoism 11/7/2017    Refusal of blood product     patient states she dose not take blood transfusion    Ventricular hypertrophy     Vitreous hemorrhage ([address]     left eye       Past Surgical History:  Past Surgical History:   Procedure Laterality Date    AMPUTATION      right great toe    ANKLE SURGERY      correction on charcot joint of right ankle    CARDIAC CATHETERIZATION  12/09/2021    Dr Jes Daly Left 3/17/2020    LEFT CARPAL TUNNEL RELEASE performed by Matt Leyva MD at [email or web address]      bilateral    CHOLECYSTECTOMY      COLONOSCOPY      CYSTOSCOPY      DIALYSIS FISTULA CREATION Left 01/31/2018    upper arm/Dr. Martin Abad    ECHO COMPL W DOP COLOR FLOW  2/14/2013         ECHO COMPLETE  9/17/2013         FINGER AMPUTATION Left 1/20/2022    AMPUTATION OF LEFT THIRD DIGIT, DISTAL PHALANX performed by Marylou Galo MD at [address]      right    OTHER SURGICAL HISTORY  9/27/2011    PPV, membranectomy, laser Right eye    OTHER SURGICAL HISTORY  insertion lumbar drain insertion    10/12/`14    OTHER SURGICAL HISTORY  10/22/15    percutaneous lead placement for spinal cord stimulator    OTHER SURGICAL HISTORY  11/03/2015    Spinal; cord stimulator- turned off as of 3-10-20     ID AV ANAST,UP ARM BASILIC VEIN TRANSPOSIT Left 5/15/2018    TRANSPOSITION STAGE II AV FISTULA - LEFT UPPER ARM performed by Timothy Whiting MD at [facility] ANGIOACCESS AV FISTULA Left 9/25/2018    SUPERFICIALIZATION AV FISTULA - LEFT ARM performed by Timothy Whiting MD at [address] [facility] METH Left 4/10/2018    PARS PLANA VITRECTOMY 25 GAUGE RETINAL Physically Abused: Not on file    Sexually Abused: Not on file   Housing Stability:     Unable to Pay for Housing in the Last Year: Not on file    Number of Places Lived in the Last Year: Not on file    Unstable Housing in the Last Year: Not on file       Family History:  Family History   Problem Relation Age of Onset    Breast Cancer Mother 61    Hypertension Mother     Heart Disease Father     Prostate Cancer Father     Breast Cancer Maternal Grandmother 60       Review of Systems   All other systems reviewed and are negative. Labs:  Recent Labs     03/13/22  0556 03/14/22  0720 03/15/22  0629   WBC 7.4 8.5 7.0   HGB 10.8* 10.7* 10.2*   HCT 34.8 34.4 33.5*    240 225      Recent Labs     03/13/22  0556 03/14/22  0720 03/15/22  0629   BUN 40* 51* 31*   CREATININE 6.2* 7.6* 5.1*       Objective:  Vitals BP (!) 108/55   Pulse 64   Temp 97 °F (36.1 °C) (Temporal)   Resp 16   Ht 5' 10\" (1.778 m)   Wt 179 lb 14.3 oz (81.6 kg)   SpO2 96%   BMI 25.81 kg/m²   Physical Exam  Vitals and nursing note reviewed. Constitutional:       General: She is not in acute distress. Appearance: She is obese. She is ill-appearing. She is not toxic-appearing. Comments: Back brace in place, she appears drowsy and older than stated age   HENT:      Head: Normocephalic and atraumatic. Nose: Nose normal.      Mouth/Throat:      Mouth: Mucous membranes are moist.      Pharynx: Oropharynx is clear. Eyes:      General: No scleral icterus. Extraocular Movements: Extraocular movements intact. Conjunctiva/sclera: Conjunctivae normal.   Cardiovascular:      Rate and Rhythm: Normal rate and regular rhythm. Pulses: Normal pulses. Pulmonary:      Effort: Pulmonary effort is normal. No respiratory distress. Abdominal:      General: Abdomen is flat. There is no distension. Palpations: Abdomen is soft. Musculoskeletal:         General: Swelling present. Normal range of motion.    Skin: General: Skin is warm and dry. Capillary Refill: Capillary refill takes less than 2 seconds. Neurological:      General: No focal deficit present. Mental Status: She is alert. Mental status is at baseline. Psychiatric:         Mood and Affect: Mood normal.         Behavior: Behavior normal.         Thought Content: Thought content normal.         Judgment: Judgment normal.            Transesophgeal Echocardiogram 11/11  Transesophageal Echocardiography Report (KIARA)      Demographics      Patient Name        Nereyda Cruz Gender               Female      Medical Record      [id number]        Room Number   Number      Account #           [de-identified]       Procedure Date       11/11/2021      Corporate ID                        Ordering Physician   Alexia Rangel MD      Accession Number    [id number]      Referring Physician  Alexia Rangel MD      Date of Birth       1955      Sonographer          Nirmala FOLEY      Age                 73 year(s)      Interpreting         Alexia Rangel MD                                       Physician                                          Any Other     Procedure     Type of Study      KIARA procedure:Transesophageal Echo (KIARA). Procedure Date  Date: 11/11/2021 Start: 10:49 AM     Study Location: CVL  Technical Quality: Adequate visualization     Indications:Mass, cardiac.     Patient Status: Routine     Contrast Medium: Bubble Study. Amount - 10 ml     Contrast Comments: done by the rn.     Height: 70 inches Weight: 183 pounds BSA: 2.01 m^2 BMI: 26.26 kg/m^2     Rhythm: Within normal limits HR: 61 bpm BP: 133/58 mmHg O2 Saturation: 98 %     KIARA Performed By: the attending and the sonographer      Type of Anesthesia: Moderate sedation     Allergies    - Other drug:(Lasix).      Findings      Left Ventricle   Normal left ventricle size and systolic function. Right Ventricle   Normal right ventricular size and function.       Left Atrium   Normal sized left atrium. There is no left atrial appendage thrombus. Agitated saline injected for shunt evaluation. No evidence of patent foramen ovale. Right Atrium   Normal right atrium size. Mitral Valve   Structurally normal anterior mitral leaflet with echodensity attached to   the ventricular side of the posterior leaflet(1.0x 1.2cm) with restriction   excursion. Mild centrally directed mitral regurgitation. Tricuspid Valve   The tricuspid valve appears structurally normal.   Mild tricuspid regurgitation. Aortic Valve   Structurally normal aortic valve. Pulmonic Valve   The pulmonic valve was not well visualized. Pulmonic valve is structurally normal.      Pericardial Effusion   No pericardial effusion. Aorta   Aortic root dimension within normal limits. No significant plaque noted in the descending aorta. Conclusions      Summary   Normal left ventricle size and systolic function. Structurally normal anterior mitral leaflet with echodensity attached to   the ventricular side of the posterior leaflet(1.0x 1.2cm) with restriction   excursion. Mild centrally directed mitral regurgitation. Mild tricuspid regurgitation. Left Heart Catheterization 2021  [address]                  [address]                             CARDIAC CATHETERIZATION     PATIENT NAME: Trung Coronado                    :        1955  MED REC NO:   [id number]                            ROOM:       [facility]  ACCOUNT NO:   [de-identified]                           ADMIT DATE: 2021  PROVIDER:     Mj Hammond MD     DATE OF PROCEDURE:  2021     PROCEDURES:  1. Coronary angiography. 2.  Conscious sedation using Versed and fentanyl.     Indication #70, score of 7.     INDICATION FOR THE PROCEDURE:  The patient is a 52-year-old female with  history of CAD, who is having mitral valve surgery.   The patient is here  for coronary angiography to evaluate her coronary anatomy prior to  cardiac surgery.     DESCRIPTION OF PROCEDURE:  After the appropriate informed consent  obtained, the right wrist area was prepped and draped in the usual  sterile fashion. A timeout was called. The right wrist area was  locally anesthetized with 10 mL of 2% lidocaine. A 21-gauge needle was  used to access the right radial artery. A 6-[place] introducer sheath  was used to cannulate the right radial artery. A 6-[place] Islander JL3.5 and  6-[place] JR4 catheters were used for coronary angiography in multiple  projections.     ANGIOGRAPHIC FINDINGS:  1. The left main coronary artery arises normally from the left sinus of  Valsalva. It is a large vessel without any disease. There is mild  distal calcification. It bifurcates into left anterior descending  artery and left circumflex artery. 2.  The left anterior descending artery has moderate-to-severe proximal  and ostial calcifications. There is 20% ostial disease. The rest of  the vessel is mildly diffusely diseased. There is patent stent in the  mid segment. The LAD gives off a few small diagonal branches. The  second one is totally occluded. The rest are mildly diseased. 3.  The left circumflex artery is a large vessel that has total  occlusion after the takeoff of the second OM branch. The first OM  branch is a mid size vessel that is mildly diseased. The second one is  the small that has chronic total occlusion with a faint left-to-left  filling collaterals. 4.  The right coronary artery has inferior takeoff. There are stents in  the mid and proximal segments. There is 80% in-stent stenosis in the  mid segment. The rest of the vessel has luminal irregularities. It  gives off good size right PDA and good size right PLV that has no  significant CAD noted.   There is significant ostial RCA also noted as  evident by damping of pressure on engagement and lack of contrast  reflux.     At the end of the procedure, the catheter and the wire were pulled out  from the body. The sheath was pulled out from the body and a Vasc Band  was applied to the right wrist area with effective hemostasis.     COMPLICATIONS:  None.     ESTIMATED TOTAL BLOOD LOSS:  15 to 20 mL.     MEDICATIONS USED DURING THE PROCEDURE:  We used intraarterial verapamil  to prevent vasospasm. We used intraarterial heparin for  anticoagulation.     CONSCIOUS SEDATION:  We used Versed and fentanyl for conscious sedation. First dose was given at 09:42, procedure ended at 10:05. There was 22  minutes of direct face-to-face supervision during conscious sedation  administration.     Total contrast used was 40 mL of Isovue.     Total fluoroscopy time was 1.9 minutes.     IMPRESSION:  1. Mild disease involving left anterior descending artery with a patent  stent in the mid segment. 2.  Totally occluded left circumflex artery. 3.  Total occlusion of the second OM branch with left-to-left  collaterals. 4.  Significant ostial RCA and significant mid RCA disease as mentioned  above.     RECOMMENDATIONS:  Continue current treatment as per CT Surgery. Assessment: Ronnie Ring is a 73 y.o. female with complex medical history who was previously scheduled for surgery on 12/29/21 for CABG x 2 (SVG-RCA, SVG-LCx/OM) and excision on MV mass, poss MV repair. Prior to OR, found to have L 3rd finger osteomyelitis, she is now s/p distal phalanx amputation. She is currently admitted after a fall with back pain.       Plan:   No urgent cardiac surgical intervention warranted  Recommended patient complete rehab as instructed and follow-up with me as an outpatient after recovery as patient would be too high risk for surgical intervention if non-ambulatory  Discussed with patient and family at bedside who voiced understanding and were comfortable with the plan      Electronically signed by Tianna Clemente DO on 3/15/2022 at 9:14 AM

## 2022-03-15 NOTE — PROGRESS NOTES
I sent a message to Dr. Christian Mcmahon regarding any additional Bowel Meds. Per patient she has not had a BM since 3/8/22. I gave her glycolax this morning.

## 2022-03-15 NOTE — PLAN OF CARE
Problem: Pain:  Goal: Pain level will decrease  Description: Pain level will decrease  Outcome: Met This Shift  Goal: Control of acute pain  Description: Control of acute pain  Outcome: Met This Shift  Goal: Control of chronic pain  Description: Control of chronic pain  Outcome: Met This Shift     Problem: Falls - Risk of:  Goal: Will remain free from falls  Description: Will remain free from falls  Outcome: Met This Shift  Goal: Absence of physical injury  Description: Absence of physical injury  Outcome: Met This Shift     Problem: Infection - Surgical Site:  Goal: Will show no infection signs and symptoms  Description: Will show no infection signs and symptoms  Outcome: Met This Shift     Problem: Pain - Acute:  Goal: Pain level will decrease  Description: Pain level will decrease  Outcome: Met This Shift     Problem: Skin Integrity:  Goal: Will show no infection signs and symptoms  Description: Will show no infection signs and symptoms  Outcome: Met This Shift  Goal: Absence of new skin breakdown  Description: Absence of new skin breakdown  Outcome: Met This Shift     Problem: Mobility - Impaired:  Goal: Mobility will improve to maximum level  Description: Mobility will improve to maximum level  Outcome: Ongoing     Problem: Sensory Perception - Impaired:  Goal: Sensory function intact, lower extremity  Description: Sensory function intact, lower extremity  Outcome: Ongoing

## 2022-03-15 NOTE — CARE COORDINATION
3/15/22 Update CM note; Patient now on stepdown general medical floor. Contact made with Kasi Chan from [facility]. She is unable to take patient due to no beds available. Call placed to Manohar Henao at [facility] to check and per Manohar Henao this facility does not take the insurance. Spoke with patient and grand daughter for additional choices. Per the list they chose [facility] skilled. Kasi Chan notified of choice. Patient will require a precert with insurance and a covid negative on day of discharge. Will await response from Kasi Chan. PT 11/24 today. Call placed to obtain Ot update. Electronically signed by Ernie Draper RN CM on 3/15/2022 at 10:57 AM       The Plan for Transition of Care is related to the following treatment goals: Rehab choices     The Patient and/or patient representative was provided with a choice of provider and agrees   with the discharge plan. [x] Yes [] No    Freedom of choice list was provided with basic dialogue that supports the patient's individualized plan of care/goals, treatment preferences and shares the quality data associated with the providers.  [x] Yes [] No

## 2022-03-15 NOTE — PROGRESS NOTES
Some what more comfortable. Nothing new to add from neurosurgical stand point at this time.   Awaiting cardiac surghery

## 2022-03-15 NOTE — PROGRESS NOTES
Occupational Therapy  OT BEDSIDE TREATMENT NOTE   [address] [address]  [facility], [address] [place] [address] [address]  Patient Name: Janny Mcconnell  MRN: [id number]  : 1955  Room: [address] [place] [address] [id number]     Referring Provider: Stephon Tsai MD  Specific Provider Orders/Date: OT eval and treat 03/10/22     Diagnosis: Lumbar stenosis without neurogenic claudication [M48.061]  Lumbar foraminal stenosis [M48.061]  Back pain [M54.9]   Pt admitted to hospital on 3/8/22 following fall - +back pain radiating to LE's  Per NS, pt decided against surgery, decided on epidural nerve block     Surgery / Procedure: Temporary Lumbar epidural catheter inserted using fluoroscopy on 3/12      Pertinent Medical History:  has a past medical history of Acute infection of bone ([address] Acute osteomyelitis of phalanx of left hand ([address] Anemia of chronic disease, Arthritis, Breast cancer ([address] CAD (coronary artery disease), Carpal tunnel syndrome, Chronic diastolic CHF (congestive heart failure) ([address] CKD (chronic kidney disease) stage 4, GFR 15-29 ml/min ([address] Diabetic retinopathy ([address] Glaucoma, Hemodialysis patient ([address] Hyperkalemia, diminished renal excretion, Hyperlipidemia, Hypertension, Hypoglycemia unawareness in type 1 diabetes mellitus ([address] Insulin dependent type 2 diabetes mellitus ([address] Neuropathy, Osteomyelitis due to secondary diabetes [facility]), Patient is [employer], Refusal of blood product, Ventricular hypertrophy, and Vitreous hemorrhage ([address] Spinal cord stimulator        Precautions:  Fall Risk, spine neutrality for pain management, epidural, O2, TAPS, HD MWF     Assessment of current deficits    [x]? Functional mobility         [x]? ADLs           [x]? Strength                  []?Cognition    [x]? Functional transfers       [x]? IADLs         [x]? Safety Awareness   [x]? Endurance    []?  Fine Coordination [x]? Balance      []? Vision/perception   []? Sensation      []? Gross Motor Coordination          []? ROM           []? Delirium                   []? Motor Control      OT PLAN OF CARE   OT POC based on physician orders, patient diagnosis and results of clinical assessment     Frequency/Duration 2-3 days/wk for 2 weeks PRN   Specific OT Treatment Interventions to include:   * Instruction/training on adapted ADL techniques and AE recommendations to increase functional independence within precautions       * Training on energy conservation strategies, correct breathing pattern and techniques to improve independence/tolerance for self-care routine  * Functional transfer/mobility training/DME recommendations for increased independence, safety, and fall prevention  * Patient/Family education to increase follow through with safety techniques and functional independence  * Recommendation of environmental modifications for increased safety with functional transfers/mobility and ADLs  * Therapeutic exercise to improve motor endurance, ROM, and functional strength for ADLs/functional transfers  * Therapeutic activities to facilitate/challenge dynamic balance, stand tolerance for increased safety and independence with ADLs  * Therapeutic activities to facilitate gross/fine motor skills for increased independence with ADLs  * Positioning to improve skin integrity, interaction with environment and functional independence     Recommended Adaptive Equipment: TBD      Home Living: Pt lives alone in 1 floor condo.  0 NIKKO   Bathroom setup: tub/shower with grab bars and tub transfer bench, 3:1 over commode   Equipment owned: 3:1 BSC, w/w, cane     Prior Level of Function: independent with ADLs , assist with heavy IADLs, mod I for light IADL's; ambulated w/o AD   Driving: no - family/friends assist     Pain Level: Pt c/o Mod back pain when seated EOB     Cognition: A&O: 4/4; Follows 1-2 step directions           Memory:  good Sequencing:  good            Problem solving:  fair            Judgement/safety:  fair              Functional Assessment:  AM-PAC Daily Activity Raw Score: 15/24    Initial Eval Status  Date: 3/13/22 Treatment Status  Date: 3/15/22 STGs = LTGs  Time frame: 10-14 days   Feeding Stand by Assist   set up Modified [place]    Grooming Minimal Assist  Min A  To apply deodorant seated EOB  Modified [place]    UB Dressing Minimal Assist  Min A  To don/doff gown seated EOB  Modified [place]    LB Dressing Dependent   Limited by back pain Max A  Pt educated on LB AE. Donned underwear seated EOB and standing to pull over hips  Minimal Assist    Bathing Maximal Assist Max A  Seated EOB  Minimal Assist    Toileting Dependent  Max A- clothing management and hygiene, assist required for posterior  Minimal Assist    Bed Mobility  Rolling: Mod A  Supine to sit: Moderate Assist   Sit to supine: Moderate Assist  Mod A- supine>sit  Educated pt on technique to increase independence.    Rolling: SBA  Supine to sit: Stand by Assist   Sit to supine: Stand by Assist    Functional Transfers NT  Deferred secondary to increased pain during EOB tasks Max A- sit<->stand  Cuing for hand placement and body mechanics  Stand by Assist    Functional Mobility NT Mod- Max A  Home distance using w/w  Stand by Assist    Balance Sitting:     Static:  SBA    Dynamic:Min A  Standing: NT Sitting:     Static:  SBA    Dynamic:Min A  Standing: Mod A     Activity Tolerance Poor+  Limited by pain  Fair- Fair+   Visual/  Perceptual Glasses: yes                        Comments: Upon arrival pt supine in bed. Educated pt on spinal precautions, demonstrates Poor+ understanding. Pt educated on adaptive techniques to increase independence and safety during ADL's, bed mobility, and functional transfers while maintaining precautions. At end of session pt left seated in bedside chair, call light within reach, daughter present.      · Pt has made fair progress towards set goals.      · Continue with current plan of care    Treatment Time In: 9:00            Treatment Time Out: 9:25             Treatment Charges: Mins Units   Ther Ex  65280     Manual Therapy [id number]     Thera Activities 73635 10 1   ADL/Home Mgt 36283 15 1   Neuro Re-ed 31379     Group Therapy      Orthotic manage/training  99189     Non-Billable Time     Total Timed Treatment 25 2     Reece LeaderYenni

## 2022-03-15 NOTE — DISCHARGE INSTR - COC
Continuity of Care Form    Patient Name: Ivory Tidwell   :  1955  MRN:  [id number]    Admit date:  3/9/2022  Discharge date:  2022    Code Status Order: Full Code   Advance Directives:   [facility] Documentation       Date/Time Healthcare Directive Type of Healthcare Directive Copy in [address] Agent's Name Healthcare Agent's Phone Number    22 1244 No, patient does not have an advance directive for healthcare treatment -- -- -- -- --            Admitting Physician:  Juan Antonio Calderon MD  PCP: Pino Vides MD    Discharging Nurse:  Caridad Mascorro [facility] Unit/Room#: 5688/0581-N  Discharging Unit Phone Number: [phone]    Emergency Contact:   Extended Emergency Contact Information  Primary Emergency Contact: Zuleima Davey, [address] [facility] [address] Phone: [phone]  Relation: Other  Secondary Emergency Contact: Hattie Cortez  Mobile Phone: [phone]  Relation: Brother/Sister    Past Surgical History:  Past Surgical History:   Procedure Laterality Date    AMPUTATION      right great toe    ANKLE SURGERY      correction on charcot joint of right ankle    CARDIAC CATHETERIZATION  2021    Dr Villalba [place] Left 3/17/2020    LEFT CARPAL TUNNEL RELEASE performed by Robbin Franco MD at [address]      bilateral    CHOLECYSTECTOMY      COLONOSCOPY      CYSTOSCOPY      DIALYSIS FISTULA CREATION Left 2018    upper arm/Dr. Gleason Board    ECHO COMPL W DOP COLOR FLOW  2013         ECHO COMPLETE  2013         FINGER AMPUTATION Left 2022    AMPUTATION OF LEFT THIRD DIGIT, DISTAL PHALANX performed by Diana Boyer MD at [facility]      right    OTHER SURGICAL HISTORY  2011    PPV, membranectomy, laser Right eye    OTHER SURGICAL HISTORY  insertion lumbar drain insertion    10/12/`14    OTHER SURGICAL HISTORY  10/22/15    percutaneous lead placement for spinal cord stimulator    OTHER SURGICAL HISTORY  11/03/2015    Spinal; cord stimulator- turned off as of 3-10-20     DE AV ANAST,UP ARM BASILIC VEIN TRANSPOSIT Left 5/15/2018    TRANSPOSITION STAGE II AV FISTULA - LEFT UPPER ARM performed by Juan Parks MD at [facility] ANGIOACCESS AV FISTULA Left 9/25/2018    SUPERFICIALIZATION AV FISTULA - LEFT ARM performed by Juan Parks MD at [address] [facility] [place] W/VITRECTOMY ANY METH Left 4/10/2018    PARS PLANA VITRECTOMY 25 GAUGE RETINAL DETACHMENT REPAIR air fluid exchange, endolaser performed by Karlos Pennington MD at [address]      L2    TRANSESOPHAGEAL ECHOCARDIOGRAM  11/11/2021    Dr. Szymanski [place]    TUNNELED VENOUS CATHETER PLACEMENT  11/15/2017    VITRECTOMY Left 04/10/2018    PARS PLANA VITRECTOMY; RETINAL DETACHMENT REPAIR; GAS BUBBLE; LASER LEFT EYE       Immunization History:   Immunization History   Administered Date(s) Administered    COVID-19, [place] Misty, Primary or Immunocompromised, PF, 100mcg/0.5mL 03/11/2021, 04/08/2021    Pneumococcal Polysaccharide ([id number]) 12/04/2018       Active Problems:  Patient Active Problem List   Diagnosis Code    Diabetic retinopathy ([facility] E11.319    Malignant neoplasm of right female breast ([facility] C50.911    Atherosclerosis of native coronary artery of native heart without angina pectoris I25.10    Moderate obesity E66.8    Left ventricular hypertrophy I51.7    Herniated lumbar intervertebral disc M51.26    Lumbar degenerative disc disease M51.36    Pseudomeningocele G96.198    Lumbar radiculopathy M54.16    Lymphedema of arm I89.0    CKD (chronic kidney disease) stage 4, GFR 15-29 ml/min ([facility]) N18.4    Insulin dependent type 2 diabetes mellitus ([facility]) E11.9, Z79.4    Anemia of chronic disease D63.8    Chronic diastolic CHF (congestive heart failure) ([facility]) I50.32    Neuropathy G62.9    Hypertension I10    Glaucoma H40.9    Refusal of blood product Z78.9 Pancytopenia ([facility] D61.818    Controlled type 2 diabetes mellitus with chronic kidney disease on chronic dialysis, with long-term current use of insulin ([facility]) E11.22, N18.6, Z79.4, Z99.2    Vitreous hemorrhage ([facility] H43.10    Patient is [place] Z78.9    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 ([facility]) E11.649    Hyperkalemia, diminished renal excretion E87.5    Chronic pain syndrome G89.4    Lumbar post-laminectomy syndrome M96.1    Myalgia M79.10    Cervicalgia M54.2    Diabetic peripheral neuropathy ([facility]) E11.42    ESRD (end stage renal disease) ([facility]) N18.6    Bilateral carpal tunnel syndrome G56.03    Spinal stenosis of lumbar region with neurogenic claudication M48.062    Cardiac arrest ([facility]) I46.9    ESRD (end stage renal disease) on dialysis ([facility]) N18.6, Z99.2    Mixed hyperlipidemia E78.2    Lymphedema of right upper extremity I89.0    Coronary artery disease involving native coronary artery of native heart with angina pectoris ([facility]) I25.119    Ventricular tachycardia ([facility]) I47.2    Mitral valve disease I05.9    CAD in native artery I25.10    Lumbar stenosis without neurogenic claudication M48.061    Lumbar foraminal stenosis M48.061    Back pain M54.9    Intractable low back pain M54.59       Isolation/Infection:   Isolation            No Isolation          Patient Infection Status       Infection Onset Added Last Indicated Last Indicated By Review Planned Expiration Resolved Resolved By    None active    Resolved    COVID-19 (Rule Out) 05/19/21 05/19/21 05/19/21 Respiratory Panel, Molecular, with COVID-19 (Restricted: peds pts or suitable admitted adults) (Ordered)   05/19/21 Rule-Out Test Resulted    COVID-19 (Rule Out) 09/17/20 09/17/20 09/17/20 Covid-19 Ambulatory (Ordered)   09/18/20 Rule-Out Test Resulted            Nurse Assessment:  Last Vital Signs: BP 93/62   Pulse 76   Temp 98.1 °F (36.7 °C) (Temporal)   Resp 16   Ht 5' 10\" (1.778 m)   Wt 179 lb 14.3 oz (81.6 kg)   SpO2 95% Comment: 91% ORA  BMI 25.81 kg/m²     Last documented pain score (0-10 scale): Pain Level: 2  Last Weight:   Wt Readings from Last 1 Encounters:   03/14/22 179 lb 14.3 oz (81.6 kg)     Mental Status:  oriented and alert    IV Access:  - None    Nursing Mobility/ADLs:  Walking   Assisted x 2 MAX assist   Transfer  Assisted  Bathing  Assisted  Dressing  Assisted  Toileting  Assisted  Feeding  Independent  Med Admin  Assisted  Med Delivery   whole    Wound Care Documentation and Therapy:        Elimination:  Continence: Bowel: Yes  Bladder: Yes  Urinary Catheter: None   Colostomy/Ileostomy/Ileal Conduit: No       Date of Last BM: 03/16/2022    Intake/Output Summary (Last 24 hours) at 3/15/2022 1402  Last data filed at 3/15/2022 0952  Gross per 24 hour   Intake 240 ml   Output --   Net 240 ml     I/O last 3 completed shifts: In: 5 [P.O.:120]  Out: 2300     Safety Concerns:     History of Falls (last 30 days) and At Risk for Falls    Impairments/Disabilities:      None    Nutrition Therapy:  Current Nutrition Therapy:   - Oral Diet:  Renal and Low Fat    Routes of Feeding: Oral  Liquids: No Restrictions  Daily Fluid Restriction: no  Last Modified Barium Swallow with Video (Video Swallowing Test): not done    Treatments at the Time of Hospital Discharge:   Respiratory Treatments: none  Oxygen Therapy:  is on oxygen at 2 L/min per nasal cannula.   Ventilator:    - No ventilator support    Rehab Therapies: Physical Therapy and Occupational Therapy  Weight Bearing Status/Restrictions: No weight bearing restrictions  Other Medical Equipment (for information only, NOT a DME order):  walker, bath bench, bedside commode, and hospital bed  Other Treatments: ***    Patient's personal belongings (please select all that are sent with patient):  Partials- at home    RN SIGNATURE:  Electronically signed by Leia Barnes RN on 3/17/22 at 1:42 PM EDT    CASE MANAGEMENT/SOCIAL WORK SECTION    Inpatient Status Date: ***    Readmission Risk Assessment Score:  Readmission Risk              Risk of Unplanned Readmission:  0           Discharging to Facility/ Agency   Name: [address] [facility]  Address:  Phone:  Fax:    Dialysis Facility (if applicable)   Name:  Address:  Dialysis Schedule:  Phone:  Fax:    / signature: Electronically signed by YRN Del Rosario on 3/15/22 at 2:02 PM EDT    PHYSICIAN SECTION    Prognosis: {Prognosis:8085124391}    Condition at Discharge: [address] [place] [address] Patient Condition:[id number]}    Rehab Potential (if transferring to Rehab): {Prognosis:6130013775}    Recommended Labs or Other Treatments After Discharge: ***    Physician Certification: I certify the above information and transfer of Ronnie Ring  is necessary for the continuing treatment of the diagnosis listed and that she requires {Admit to Appropriate Level of Care:77886} for {GREATER/LESS:981171622} 30 days.      Update Admission H&P: {CHP DME Changes in VVFSW:127183874}    PHYSICIAN SIGNATURE:  {Esignature:[id number]}

## 2022-03-16 LAB
ALBUMIN SERPL-MCNC: 3.2 G/DL (ref 3.5–5.2)
ALP BLD-CCNC: 77 U/L (ref 35–104)
ALT SERPL-CCNC: 24 U/L (ref 0–32)
ANION GAP SERPL CALCULATED.3IONS-SCNC: 15 MMOL/L (ref 7–16)
AST SERPL-CCNC: 22 U/L (ref 0–31)
BASOPHILS ABSOLUTE: 0.04 E9/L (ref 0–0.2)
BASOPHILS RELATIVE PERCENT: 0.4 % (ref 0–2)
BILIRUB SERPL-MCNC: 0.4 MG/DL (ref 0–1.2)
BUN BLDV-MCNC: 43 MG/DL (ref 6–23)
CALCIUM SERPL-MCNC: 10.3 MG/DL (ref 8.6–10.2)
CHLORIDE BLD-SCNC: 96 MMOL/L (ref 98–107)
CO2: 27 MMOL/L (ref 22–29)
CREAT SERPL-MCNC: 6.5 MG/DL (ref 0.5–1)
EOSINOPHILS ABSOLUTE: 0.02 E9/L (ref 0.05–0.5)
EOSINOPHILS RELATIVE PERCENT: 0.2 % (ref 0–6)
GFR AFRICAN AMERICAN: 8
GFR NON-AFRICAN AMERICAN: 8 ML/MIN/1.73
GLUCOSE BLD-MCNC: 213 MG/DL (ref 74–99)
HCT VFR BLD CALC: 34.1 % (ref 34–48)
HEMOGLOBIN: 10.6 G/DL (ref 11.5–15.5)
IMMATURE GRANULOCYTES #: 0.07 E9/L
IMMATURE GRANULOCYTES %: 0.6 % (ref 0–5)
LYMPHOCYTES ABSOLUTE: 0.74 E9/L (ref 1.5–4)
LYMPHOCYTES RELATIVE PERCENT: 6.7 % (ref 20–42)
MCH RBC QN AUTO: 28.5 PG (ref 26–35)
MCHC RBC AUTO-ENTMCNC: 31.1 % (ref 32–34.5)
MCV RBC AUTO: 91.7 FL (ref 80–99.9)
METER GLUCOSE: 149 MG/DL (ref 74–99)
METER GLUCOSE: 151 MG/DL (ref 74–99)
METER GLUCOSE: 162 MG/DL (ref 74–99)
MONOCYTES ABSOLUTE: 0.99 E9/L (ref 0.1–0.95)
MONOCYTES RELATIVE PERCENT: 8.9 % (ref 2–12)
NEUTROPHILS ABSOLUTE: 9.23 E9/L (ref 1.8–7.3)
NEUTROPHILS RELATIVE PERCENT: 83.2 % (ref 43–80)
PDW BLD-RTO: 15.8 FL (ref 11.5–15)
PLATELET # BLD: 254 E9/L (ref 130–450)
PMV BLD AUTO: 10.9 FL (ref 7–12)
POTASSIUM REFLEX MAGNESIUM: 5.2 MMOL/L (ref 3.5–5)
POTASSIUM SERPL-SCNC: 5.2 MMOL/L (ref 3.5–5)
RBC # BLD: 3.72 E12/L (ref 3.5–5.5)
SODIUM BLD-SCNC: 138 MMOL/L (ref 132–146)
TOTAL PROTEIN: 6.6 G/DL (ref 6.4–8.3)
WBC # BLD: 11.1 E9/L (ref 4.5–11.5)

## 2022-03-16 PROCEDURE — 80053 COMPREHEN METABOLIC PANEL: CPT

## 2022-03-16 PROCEDURE — 6370000000 HC RX 637 (ALT 250 FOR IP): Performed by: INTERNAL MEDICINE

## 2022-03-16 PROCEDURE — 36415 COLL VENOUS BLD VENIPUNCTURE: CPT

## 2022-03-16 PROCEDURE — 6370000000 HC RX 637 (ALT 250 FOR IP): Performed by: NEUROLOGICAL SURGERY

## 2022-03-16 PROCEDURE — 80048 BASIC METABOLIC PNL TOTAL CA: CPT

## 2022-03-16 PROCEDURE — 85025 COMPLETE CBC W/AUTO DIFF WBC: CPT

## 2022-03-16 PROCEDURE — 2700000000 HC OXYGEN THERAPY PER DAY

## 2022-03-16 PROCEDURE — 1200000000 HC SEMI PRIVATE

## 2022-03-16 PROCEDURE — 90935 HEMODIALYSIS ONE EVALUATION: CPT | Performed by: INTERNAL MEDICINE

## 2022-03-16 PROCEDURE — 82962 GLUCOSE BLOOD TEST: CPT

## 2022-03-16 PROCEDURE — 2580000003 HC RX 258: Performed by: INTERNAL MEDICINE

## 2022-03-16 PROCEDURE — 6370000000 HC RX 637 (ALT 250 FOR IP)

## 2022-03-16 RX ORDER — HYDROCODONE BITARTRATE AND ACETAMINOPHEN 5; 325 MG/1; MG/1
1 TABLET ORAL EVERY 6 HOURS PRN
Qty: 10 TABLET | Refills: 0 | Status: SHIPPED | OUTPATIENT
Start: 2022-03-16 | End: 2022-03-19

## 2022-03-16 RX ADMIN — TICAGRELOR 90 MG: 90 TABLET ORAL at 10:59

## 2022-03-16 RX ADMIN — TRAMADOL HYDROCHLORIDE 50 MG: 50 TABLET, COATED ORAL at 01:37

## 2022-03-16 RX ADMIN — GABAPENTIN 100 MG: 100 CAPSULE ORAL at 21:29

## 2022-03-16 RX ADMIN — ACETAMINOPHEN 650 MG: 325 TABLET ORAL at 01:37

## 2022-03-16 RX ADMIN — Medication 10 ML: at 21:30

## 2022-03-16 RX ADMIN — LATANOPROST 1 DROP: 50 SOLUTION OPHTHALMIC at 21:28

## 2022-03-16 RX ADMIN — Medication 1000 UNITS: at 10:56

## 2022-03-16 RX ADMIN — TICAGRELOR 90 MG: 90 TABLET ORAL at 21:30

## 2022-03-16 RX ADMIN — ASPIRIN 81 MG 81 MG: 81 TABLET ORAL at 10:57

## 2022-03-16 RX ADMIN — LANTHANUM CARBONATE 1000 MG: 500 TABLET, CHEWABLE ORAL at 14:37

## 2022-03-16 RX ADMIN — TRAMADOL HYDROCHLORIDE 50 MG: 50 TABLET, COATED ORAL at 14:38

## 2022-03-16 RX ADMIN — INSULIN LISPRO 1 UNITS: 100 INJECTION, SOLUTION INTRAVENOUS; SUBCUTANEOUS at 22:03

## 2022-03-16 RX ADMIN — GABAPENTIN 100 MG: 100 CAPSULE ORAL at 14:39

## 2022-03-16 RX ADMIN — ATORVASTATIN CALCIUM 80 MG: 40 TABLET, FILM COATED ORAL at 21:27

## 2022-03-16 RX ADMIN — HYDROCODONE BITARTRATE AND ACETAMINOPHEN 1 TABLET: 5; 325 TABLET ORAL at 21:27

## 2022-03-16 RX ADMIN — ALLOPURINOL 100 MG: 100 TABLET ORAL at 10:56

## 2022-03-16 RX ADMIN — Medication 10 ML: at 11:00

## 2022-03-16 RX ADMIN — LANTHANUM CARBONATE 1000 MG: 500 TABLET, CHEWABLE ORAL at 18:01

## 2022-03-16 RX ADMIN — INSULIN LISPRO 1 UNITS: 100 INJECTION, SOLUTION INTRAVENOUS; SUBCUTANEOUS at 18:01

## 2022-03-16 RX ADMIN — MAGNESIUM SULFATE: 1 CRYSTAL ORAL; TOPICAL at 16:54

## 2022-03-16 RX ADMIN — PANTOPRAZOLE SODIUM 40 MG: 40 TABLET, DELAYED RELEASE ORAL at 10:56

## 2022-03-16 RX ADMIN — HYDROCODONE BITARTRATE AND ACETAMINOPHEN 1 TABLET: 5; 325 TABLET ORAL at 11:01

## 2022-03-16 ASSESSMENT — PAIN DESCRIPTION - DESCRIPTORS
DESCRIPTORS: CRAMPING;CRUSHING;DISCOMFORT
DESCRIPTORS: DISCOMFORT;SHARP;RADIATING
DESCRIPTORS: ACHING;DISCOMFORT;TENDER
DESCRIPTORS: DISCOMFORT;CRUSHING;CRAMPING

## 2022-03-16 ASSESSMENT — PAIN DESCRIPTION - ORIENTATION
ORIENTATION: LOWER;INNER;MID
ORIENTATION: LOWER
ORIENTATION: MID;LOWER
ORIENTATION: LOWER;MID

## 2022-03-16 ASSESSMENT — PAIN DESCRIPTION - PROGRESSION
CLINICAL_PROGRESSION: GRADUALLY IMPROVING

## 2022-03-16 ASSESSMENT — PAIN - FUNCTIONAL ASSESSMENT
PAIN_FUNCTIONAL_ASSESSMENT: PREVENTS OR INTERFERES SOME ACTIVE ACTIVITIES AND ADLS

## 2022-03-16 ASSESSMENT — PAIN DESCRIPTION - PAIN TYPE
TYPE: CHRONIC PAIN

## 2022-03-16 ASSESSMENT — PAIN DESCRIPTION - LOCATION
LOCATION: BACK

## 2022-03-16 ASSESSMENT — PAIN DESCRIPTION - ONSET
ONSET: AWAKENED FROM SLEEP
ONSET: AWAKENED FROM SLEEP
ONSET: ON-GOING
ONSET: ON-GOING

## 2022-03-16 ASSESSMENT — PAIN SCALES - GENERAL
PAINLEVEL_OUTOF10: 9
PAINLEVEL_OUTOF10: 2
PAINLEVEL_OUTOF10: 6
PAINLEVEL_OUTOF10: 10
PAINLEVEL_OUTOF10: 0
PAINLEVEL_OUTOF10: 0
PAINLEVEL_OUTOF10: 2
PAINLEVEL_OUTOF10: 9

## 2022-03-16 ASSESSMENT — PAIN DESCRIPTION - FREQUENCY
FREQUENCY: CONTINUOUS
FREQUENCY: CONTINUOUS
FREQUENCY: INTERMITTENT
FREQUENCY: CONTINUOUS

## 2022-03-16 ASSESSMENT — PAIN SCALES - WONG BAKER
WONGBAKER_NUMERICALRESPONSE: 0

## 2022-03-16 ASSESSMENT — PAIN DESCRIPTION - DIRECTION: RADIATING_TOWARDS: TO ABDOMEN

## 2022-03-16 NOTE — PLAN OF CARE
Problem: Pain:  Goal: Pain level will decrease  Description: Pain level will decrease  Outcome: Met This Shift  Goal: Control of acute pain  Description: Control of acute pain  Outcome: Met This Shift  Goal: Control of chronic pain  Description: Control of chronic pain  Outcome: Met This Shift     Problem: Pain - Acute:  Goal: Pain level will decrease  Description: Pain level will decrease  Outcome: Met This Shift

## 2022-03-16 NOTE — CARE COORDINATION
3/16/22 Update CM note; Patient precert is pending with insurance for discharge to [facility].  Electronically signed by Jacqueline Cuellar RN CM on 3/16/2022 at 2:43 PM

## 2022-03-16 NOTE — FLOWSHEET NOTE
03/16/22 0949   Vital Signs   BP (!) 121/32   Temp 98.3 °F (36.8 °C)   Pulse 89   Resp 18   Weight 179 lb 10.8 oz (81.5 kg)   Weight Method Bed scale   Percent Weight Change 0   Pain Assessment   Pain Assessment 0-10   Pain Level 0   Post-Hemodialysis Assessment   Post-Treatment Procedures Blood returned; Access bleeding time < 10 minutes   Machine Disinfection Process Exterior Machine Disinfection   Dialyzer Clearance Clear   Duration of Treatment (minutes) 210 minutes   Heparin amount administered during treatment (units) 0 units   Hemodialysis Intake (ml) 300 ml   Hemodialysis Output (ml) 1500 ml   NET Removed (ml) 1200 ml   Tolerated Treatment Good   Patient Response to Treatment tolerated well, transient hypotension resolved with decreased uf, 1200cc fluid removal   Bilateral Breath Sounds Clear   Edema Right lower extremity; Left lower extremity;Right upper extremity

## 2022-03-16 NOTE — PROGRESS NOTES
[facility]  Nephrology Progress Note  Patient's Name: Bassem Alvarado    History of Present Illness from 3/10 Consult Note:  \"Claudia Lofton is a 78 y.o. female with a past medical history of breast CA, anemia, hypertension, hyperlipidemia, and glaucoma. She presented to [facility] ED on 3/8 following a fall. Per the ED provider note the patient had back pain/had fell. She was dialyzed on 3/9 at [facility]. Patient was transferred to [facility], as she can see neurosurgery here. Lab data today includes potassium 4.7, BUN 26, creatinine 4.7, and hemoglobin 11.7. We were consulted to see the patient for ESRD on HD MWF. Patient is known to our service and dialyzes at [address] MWF via a left AV fistula. At present, patient was seen and examined. She has family at the bedside. She reports that she had a fall after feeling weakness and pain in her back, on Tuesday morning. She explained that she was unable to ambulate at that time. She denies any current chest pain or shortness of breath. No abdominal pain, nausea, or vomiting. She denied any dizziness. \"    Subjective:    3/15: Patient was seen and examined. She reports that she feels \"better. \"  She is currently sitting up in a chair. She tells me that she ambulated to the door. She explained that her back is still sore. She denies any chest pain or shortness of breath. No abdominal pain or nausea. She is eating and drinking well.     3/16: Seen during hemodialysis she reports ongoing back pain        Allergies:  Furosemide    Current Medications:    latanoprost (XALATAN) 0.005 % ophthalmic solution 1 drop, Nightly  aspirin chewable tablet 81 mg, Daily  ticagrelor (BRILINTA) tablet 90 mg, BID  lactulose (CHRONULAC) 10 GM/15ML solution 20 g, TID  methylPREDNISolone acetate (DEPO-MEDROL) injection 80 mg, Once  naloxone (NARCAN) injection 0.4 mg, PRN  diphenhydrAMINE (BENADRYL) injection 25 mg, Q6H PRN  Vitamin D (CHOLECALCIFEROL) tablet 1,000 Units, Daily  sodium chloride flush 0.9 % injection 5-40 mL, 2 times per day  sodium chloride flush 0.9 % injection 5-40 mL, PRN  0.9 % sodium chloride infusion, PRN  ondansetron (ZOFRAN-ODT) disintegrating tablet 4 mg, Q8H PRN   Or  ondansetron (ZOFRAN) injection 4 mg, Q6H PRN  polyethylene glycol (GLYCOLAX) packet 17 g, Daily PRN  acetaminophen (TYLENOL) tablet 650 mg, Q6H PRN   Or  acetaminophen (TYLENOL) suppository 650 mg, Q6H PRN  HYDROcodone-acetaminophen (NORCO) 5-325 MG per tablet 1 tablet, Q6H PRN  insulin lispro (HUMALOG) injection vial 0-6 Units, TID WC  insulin lispro (HUMALOG) injection vial 0-3 Units, Nightly  allopurinol (ZYLOPRIM) tablet 100 mg, Daily  atorvastatin (LIPITOR) tablet 80 mg, Nightly  gabapentin (NEURONTIN) capsule 100 mg, TID  hydrALAZINE (APRESOLINE) tablet 10 mg, BID  isosorbide mononitrate (IMDUR) extended release tablet 30 mg, Daily  lanthanum (FOSRENOL) chewable tablet 1,000 mg, TID WC  metoprolol succinate (TOPROL XL) extended release tablet 25 mg, Daily  midodrine (PROAMATINE) tablet 5 mg, PRN  pantoprazole (PROTONIX) tablet 40 mg, QAM AC  traMADol (ULTRAM) tablet 50 mg, Q6H PRN      No current facility-administered medications on file prior to encounter.      Current Outpatient Medications on File Prior to Encounter   Medication Sig Dispense Refill    atorvastatin (LIPITOR) 80 MG tablet Take 80 mg by mouth nightly      bumetanide (BUMEX) 2 MG tablet Take 2 mg by mouth three times a week *SUN-TUE-THUR*      isosorbide mononitrate (IMDUR) 30 MG extended release tablet Take 30 mg by mouth daily      insulin glargine (LANTUS) 100 UNIT/ML injection vial Inject 11 Units into the skin nightly      hydrALAZINE (APRESOLINE) 10 MG tablet Take 1 tablet by mouth 2 times daily 180 tablet 1    metoprolol succinate (TOPROL XL) 25 MG extended release tablet Take 1 tablet by mouth daily 90 tablet 1    gabapentin (NEURONTIN) 100 MG capsule Take 1 capsule by mouth 3 times daily for 180 days. Intended supply: 30 days 90 capsule 5    lanthanum (FOSRENOL) 1000 MG chewable tablet Take 1,000 mg by mouth 3 times daily (with meals)       allopurinol (ZYLOPRIM) 100 MG tablet Take 1 tablet by mouth daily 90 tablet 1    ticagrelor (BRILINTA) 90 MG TABS tablet Take 1 tablet by mouth 2 times daily 180 tablet 1    aspirin 81 MG EC tablet Take 1 tablet by mouth daily 30 tablet 3    B Complex-C-Folic Acid (BONI CAPS) 1 MG CAPS Take 1 mg by mouth nightly       LUMIGAN 0.01 % SOLN ophthalmic drops Place 1 drop into the right eye nightly       COMBIGAN 0.2-0.5 % ophthalmic solution Place 1 drop into the right eye every 12 hours   7    lidocaine-prilocaine (EMLA) 2.5-2.5 % cream Apply topically See Admin Instructions APPLY SMALL AMOUNT TO ACCESS SITE (AVF) 30-45 MINUTES BEFORE DIALYSIS. COVER WITH OCCLUSIVE DRESSING ( SARAN WRAP)      midodrine (PROAMATINE) 5 MG tablet Take 1 tablet by mouth as needed (may repeat x1 durring HD for assymptomatic SBP<100) 90 tablet 0    omeprazole (PRILOSEC) 20 MG delayed release capsule Take 20 mg by mouth daily as needed (GI DISCOMFORT)         Physical exam:  Vitals:    03/16/22 0949   BP: (!) 121/32   Pulse: 89   Resp: 18   Temp: 98.3 °F (36.8 °C)   SpO2:      Wt Readings from Last 3 Encounters:   03/16/22 179 lb 10.8 oz (81.5 kg)   03/09/22 184 lb 1.4 oz (83.5 kg)   01/20/22 181 lb (82.1 kg)     Temp Readings from Last 3 Encounters:   03/16/22 98.3 °F (36.8 °C)   03/09/22 98.2 °F (36.8 °C)   02/28/22 98.1 °F (36.7 °C)     BP Readings from Last 3 Encounters:   03/16/22 (!) 121/32   03/09/22 102/60   01/20/22 (!) 165/74     Pulse Readings from Last 3 Encounters:   03/16/22 89   03/09/22 74   01/20/22 62       General appearance: Awake, alert, no acute distress  Skin: Warm/dry; no rashes on exposed extremities  Neck: No JVD noted  Lungs: Clear bilaterally; no adventitious lung sounds; unlabored  Heart: Regular rate and rhythm.   No Value Date    CALCIUM 10.3 (H) 03/16/2022    PHOS 5.8 (H) 03/11/2022      Lab Results   Component Value Date    IRON 33 (L) 11/09/2017    TIBC 222 (L) 11/09/2017    FERRITIN 334 11/09/2017     Imaging:  No results found. No results found for this or any previous visit. Assessment/ Plans:    1. ESRD on HD  OP [facility] MWF   left AV fistula  Continue HD MWF    2.  Back pain persists  S/p fall 3/8  CT lumbar spine 3/8 negative for fracture, severe central canal stenosis  High risk for surgery per Cardiology   S/p epidural catheter insertion/ nerve block 3/12  Neurosurgery following    3. Hypertension with ESRD  BP goal< 140/90  BP at goal  Follow on current regimen/with HD    4. Secondary hyperparathyroidism of renal origin  Lab work from 3/11 showed , Phos 5.8, vitamin D 28  Continue phos binder   Low phos diet   Continue vit d supplement   Follow    5. Hyperkalemia   in setting of ESRD  Low potassium diet  Follow with HD    6. Anemia   Hgb target of 10-12  Hemoglobin 10.2  JAYSHREE if hemoglobin<10  Transfuse for hemoglobin<7  Follow    Manolo Cai MD     Department of Internal Medicine  Section of Nephrology  Dialysis Note        PROCEDURE:  Patient seen on hemodialysis at 10:07 AM    PHYSICIAN:  Alcira Rees M.D., [facility]    INDICATION:  End-stage renal disease    RX:  See dialysis flowsheet for specifics on access, blood flow rate, dialysate baths, duration of dialysis, anticoagulation and other technical information.     COMMENTS:  Procedure in progress and tolerated       Manolo Cai MD, MD

## 2022-03-16 NOTE — PROGRESS NOTES
Hospitalist Progress Note      SYNOPSIS: Patient admitted on 3/9/2022 for low back pain. This is a 60-year-old black female who was transferred from [facility] because of back pain. Symptoms started few days ago when she took a step and fell on the ground. She denies lower extremity weakness. She denies chest pain, no hematuria dysuria hematemesis or hematochezia. She denies subjective fever or chills. Patient has a history of discectomy and a spinal cord stimulator placed for pain by Dr. Yasmine Myles. At the time of examination patient continues to complain of back pain without weakness. After prolonged discussion between family and neurosurgery, patient decided to have epidural nerve block. A temporary lumbar epidural catheter inserted using fluoroscopy on 3/12/2022. Patient received her epidural shot on 3/14 and epidural catheter was discontinued afterwards. SUBJECTIVE:    Patient seen and examined in her room. Patient seen and examined in dialysis unit. Back pain better but not completely resolved. Denies any chest pain, nausea, vomiting, shortness of breath. Records reviewed. Stable overnight. No other overnight issues reported. Temp (24hrs), Av.7 °F (36.5 °C), Min:96.7 °F (35.9 °C), Max:98.3 °F (36.8 °C)    DIET: ADULT DIET; Regular; Low Fat/Low Chol/High Fiber/2 gm Na; Low Potassium (Less than 3000 mg/day); Low Phosphorus (Less than 1000 mg)  CODE: Full Code    Intake/Output Summary (Last 24 hours) at 3/16/2022 0911  Last data filed at 3/15/2022 2125  Gross per 24 hour   Intake 600 ml   Output --   Net 600 ml       OBJECTIVE:    BP (!) 130/30   Pulse 63   Temp 98.3 °F (36.8 °C)   Resp 18   Ht 5' 10\" (1.778 m)   Wt 179 lb 10.8 oz (81.5 kg)   SpO2 100%   BMI 25.78 kg/m²     General appearance: No apparent distress, appears stated age and cooperative. HEENT:  Conjunctivae/corneas clear. Neck: Supple. No jugular venous distention.    Respiratory: Clear to auscultation bilaterally, normal respiratory effort  Cardiovascular: Regular rate rhythm, normal S1-S2  Abdomen: Soft, nontender, nondistended  Musculoskeletal: No clubbing, cyanosis, no bilateral lower extremity edema. Brisk capillary refill. Skin:  No rashes  on visible skin  Neurologic: awake, alert and following commands     ASSESSMENT & PLAN:      1. Back pain with spinal stenosis: Cervical and lumbar spine CT consistent with  Severe central canal stenosis at L2-3.  Moderate stenosis at L1-2.   5.  Multilevel neural foraminal stenoses, worst (severe) at L2-3.  Moderate   to severe stenoses at L1-2.    Pain control as indicated. Neurosurgery consultation  Status post epidural nerve block on 3/14     2. End-stage renal disease on hemodialysis: Monday Wednesday Friday, consult nephrology for renal replacement therapy. Dialysis as per nephrology     3. Chronic anemia secondary to end-stage renal disease: Monitor H&H, continue JAYSHREE.     4.  Essential hypertension: Continue outpatient medications with hydralazine and metoprolol.     5. Hyperlipidemia: Continue statin     6. 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  Gout without acute attack: Continue allopurinol    8. History of coronary artery disease post PCI  Also have mitral valve papillary fibroblastoma  Patient will require future CABG x2 with resection of the MV fibroblastoma  Family requested more information regarding follow-up of cardiology care, requested cardiology team to talk with the patient     Diet: Diet NPO  Code Status: Full Code        DISPOSITION:   Medically stable for discharge.     Medications:  REVIEWED DAILY    Infusion Medications    sodium chloride       Scheduled Medications    latanoprost  1 drop Right Eye Nightly    aspirin  81 mg Oral Daily    ticagrelor  90 mg Oral BID    lactulose  20 g Oral TID    methylPREDNISolone acetate  80 mg Epidural Once    Vitamin D  1,000 Units Oral Daily    sodium chloride flush  5-40 mL IntraVENous 2 times per day    insulin lispro  0-6 Units SubCUTAneous TID WC    insulin lispro  0-3 Units SubCUTAneous Nightly    allopurinol  100 mg Oral Daily    atorvastatin  80 mg Oral Nightly    gabapentin  100 mg Oral TID    hydrALAZINE  10 mg Oral BID    isosorbide mononitrate  30 mg Oral Daily    lanthanum  1,000 mg Oral TID WC    metoprolol succinate  25 mg Oral Daily    pantoprazole  40 mg Oral QAM AC     PRN Meds: naloxone, diphenhydrAMINE, sodium chloride flush, sodium chloride, ondansetron **OR** ondansetron, polyethylene glycol, acetaminophen **OR** acetaminophen, HYDROcodone 5 mg - acetaminophen, midodrine, traMADol    Labs:     Recent Labs     03/14/22  0720 03/15/22  0629 03/16/22  0618   WBC 8.5 7.0 11.1   HGB 10.7* 10.2* 10.6*   HCT 34.4 33.5* 34.1    225 254       Recent Labs     03/14/22  0720 03/14/22  0720 03/15/22  0629 03/16/22  0618     --  136 138   K 5.3*  5.3*   < > 5.3*  5.3* 5.2*  5.2*   CL 96*  --  96* 96*   CO2 27  --  26 27   BUN 51*  --  31* 43*   CREATININE 7.6*  --  5.1* 6.5*   CALCIUM 10.3*  --  9.7 10.3*    < > = values in this interval not displayed. Recent Labs     03/14/22  0720 03/15/22  0629 03/16/22  0618   PROT 6.7 6.2* 6.6   ALKPHOS 74 68 77   ALT 21 19 24   AST 25 21 22   BILITOT 0.5 0.5 0.4       No results for input(s): INR in the last 72 hours. No results for input(s): Ayla Meraz in the last 72 hours.     Chronic labs:    Lab Results   Component Value Date    CHOL 101 05/19/2021    TRIG 106 05/19/2021    HDL 38 05/19/2021    LDLCALC 42 05/19/2021    TSH 2.810 05/19/2021    INR 1.0 01/20/2022    LABA1C 5.7 09/07/2021       Radiology: REVIEWED DAILY    +++++++++++++++++++++++++++++++++++++++++++++++++  Fadumo Morse MD  [employer] - [address] [place], [place]  +++++++++++++++++++++++++++++++++++++++++++++++++  NOTE: This report was transcribed using voice recognition software. Every effort was made to ensure accuracy; however, inadvertent computerized transcription errors may be present.

## 2022-03-17 VITALS
OXYGEN SATURATION: 99 % | TEMPERATURE: 98 F | HEART RATE: 69 BPM | DIASTOLIC BLOOD PRESSURE: 69 MMHG | SYSTOLIC BLOOD PRESSURE: 132 MMHG | RESPIRATION RATE: 18 BRPM | HEIGHT: 70 IN | WEIGHT: 179.68 LBS | BODY MASS INDEX: 25.72 KG/M2

## 2022-03-17 LAB
ALBUMIN SERPL-MCNC: 3.2 G/DL (ref 3.5–5.2)
ALP BLD-CCNC: 75 U/L (ref 35–104)
ALT SERPL-CCNC: 22 U/L (ref 0–32)
ANION GAP SERPL CALCULATED.3IONS-SCNC: 14 MMOL/L (ref 7–16)
AST SERPL-CCNC: 21 U/L (ref 0–31)
BASOPHILS ABSOLUTE: 0.04 E9/L (ref 0–0.2)
BASOPHILS RELATIVE PERCENT: 0.4 % (ref 0–2)
BILIRUB SERPL-MCNC: 0.6 MG/DL (ref 0–1.2)
BUN BLDV-MCNC: 28 MG/DL (ref 6–23)
CALCIUM SERPL-MCNC: 10.5 MG/DL (ref 8.6–10.2)
CHLORIDE BLD-SCNC: 97 MMOL/L (ref 98–107)
CO2: 26 MMOL/L (ref 22–29)
CREAT SERPL-MCNC: 4.5 MG/DL (ref 0.5–1)
EOSINOPHILS ABSOLUTE: 0.23 E9/L (ref 0.05–0.5)
EOSINOPHILS RELATIVE PERCENT: 2.4 % (ref 0–6)
GFR AFRICAN AMERICAN: 12
GFR NON-AFRICAN AMERICAN: 12 ML/MIN/1.73
GLUCOSE BLD-MCNC: 123 MG/DL (ref 74–99)
HCT VFR BLD CALC: 36 % (ref 34–48)
HEMOGLOBIN: 11.2 G/DL (ref 11.5–15.5)
IMMATURE GRANULOCYTES #: 0.08 E9/L
IMMATURE GRANULOCYTES %: 0.8 % (ref 0–5)
LYMPHOCYTES ABSOLUTE: 1.07 E9/L (ref 1.5–4)
LYMPHOCYTES RELATIVE PERCENT: 11.2 % (ref 20–42)
MCH RBC QN AUTO: 28.3 PG (ref 26–35)
MCHC RBC AUTO-ENTMCNC: 31.1 % (ref 32–34.5)
MCV RBC AUTO: 90.9 FL (ref 80–99.9)
METER GLUCOSE: 127 MG/DL (ref 74–99)
METER GLUCOSE: 129 MG/DL (ref 74–99)
METER GLUCOSE: 141 MG/DL (ref 74–99)
MONOCYTES ABSOLUTE: 1.35 E9/L (ref 0.1–0.95)
MONOCYTES RELATIVE PERCENT: 14.1 % (ref 2–12)
NEUTROPHILS ABSOLUTE: 6.79 E9/L (ref 1.8–7.3)
NEUTROPHILS RELATIVE PERCENT: 71.1 % (ref 43–80)
PDW BLD-RTO: 15.9 FL (ref 11.5–15)
PLATELET # BLD: 269 E9/L (ref 130–450)
PMV BLD AUTO: 10.4 FL (ref 7–12)
POTASSIUM REFLEX MAGNESIUM: 5 MMOL/L (ref 3.5–5)
POTASSIUM SERPL-SCNC: 5 MMOL/L (ref 3.5–5)
RBC # BLD: 3.96 E12/L (ref 3.5–5.5)
SARS-COV-2, NAAT: NOT DETECTED
SODIUM BLD-SCNC: 137 MMOL/L (ref 132–146)
TOTAL PROTEIN: 6.8 G/DL (ref 6.4–8.3)
WBC # BLD: 9.6 E9/L (ref 4.5–11.5)

## 2022-03-17 PROCEDURE — 80053 COMPREHEN METABOLIC PANEL: CPT

## 2022-03-17 PROCEDURE — 2580000003 HC RX 258: Performed by: INTERNAL MEDICINE

## 2022-03-17 PROCEDURE — 97535 SELF CARE MNGMENT TRAINING: CPT

## 2022-03-17 PROCEDURE — 6370000000 HC RX 637 (ALT 250 FOR IP): Performed by: INTERNAL MEDICINE

## 2022-03-17 PROCEDURE — 82962 GLUCOSE BLOOD TEST: CPT

## 2022-03-17 PROCEDURE — 80048 BASIC METABOLIC PNL TOTAL CA: CPT

## 2022-03-17 PROCEDURE — 6370000000 HC RX 637 (ALT 250 FOR IP)

## 2022-03-17 PROCEDURE — 87635 SARS-COV-2 COVID-19 AMP PRB: CPT

## 2022-03-17 PROCEDURE — 2700000000 HC OXYGEN THERAPY PER DAY

## 2022-03-17 PROCEDURE — 6370000000 HC RX 637 (ALT 250 FOR IP): Performed by: NEUROLOGICAL SURGERY

## 2022-03-17 PROCEDURE — 85025 COMPLETE CBC W/AUTO DIFF WBC: CPT

## 2022-03-17 PROCEDURE — 36415 COLL VENOUS BLD VENIPUNCTURE: CPT

## 2022-03-17 PROCEDURE — 97530 THERAPEUTIC ACTIVITIES: CPT

## 2022-03-17 RX ADMIN — GABAPENTIN 100 MG: 100 CAPSULE ORAL at 08:15

## 2022-03-17 RX ADMIN — ISOSORBIDE MONONITRATE 30 MG: 30 TABLET, EXTENDED RELEASE ORAL at 08:15

## 2022-03-17 RX ADMIN — Medication 10 ML: at 08:17

## 2022-03-17 RX ADMIN — ASPIRIN 81 MG 81 MG: 81 TABLET ORAL at 08:15

## 2022-03-17 RX ADMIN — HYDRALAZINE HYDROCHLORIDE 10 MG: 10 TABLET, FILM COATED ORAL at 08:17

## 2022-03-17 RX ADMIN — TICAGRELOR 90 MG: 90 TABLET ORAL at 08:16

## 2022-03-17 RX ADMIN — HYDROCODONE BITARTRATE AND ACETAMINOPHEN 1 TABLET: 5; 325 TABLET ORAL at 17:52

## 2022-03-17 RX ADMIN — ALLOPURINOL 100 MG: 100 TABLET ORAL at 08:16

## 2022-03-17 RX ADMIN — POLYETHYLENE GLYCOL 3350 17 G: 17 POWDER, FOR SOLUTION ORAL at 08:23

## 2022-03-17 RX ADMIN — Medication 1000 UNITS: at 08:16

## 2022-03-17 RX ADMIN — HYDRALAZINE HYDROCHLORIDE 10 MG: 10 TABLET, FILM COATED ORAL at 17:48

## 2022-03-17 RX ADMIN — GABAPENTIN 100 MG: 100 CAPSULE ORAL at 14:13

## 2022-03-17 RX ADMIN — PANTOPRAZOLE SODIUM 40 MG: 40 TABLET, DELAYED RELEASE ORAL at 07:10

## 2022-03-17 RX ADMIN — METOPROLOL SUCCINATE 25 MG: 25 TABLET, EXTENDED RELEASE ORAL at 08:15

## 2022-03-17 RX ADMIN — HYDROCODONE BITARTRATE AND ACETAMINOPHEN 1 TABLET: 5; 325 TABLET ORAL at 07:14

## 2022-03-17 ASSESSMENT — PAIN DESCRIPTION - DESCRIPTORS: DESCRIPTORS: CONSTANT;ACHING;SHOOTING

## 2022-03-17 ASSESSMENT — PAIN SCALES - GENERAL
PAINLEVEL_OUTOF10: 9
PAINLEVEL_OUTOF10: 8

## 2022-03-17 ASSESSMENT — PAIN DESCRIPTION - LOCATION: LOCATION: BACK

## 2022-03-17 ASSESSMENT — PAIN DESCRIPTION - FREQUENCY: FREQUENCY: CONTINUOUS

## 2022-03-17 ASSESSMENT — PAIN DESCRIPTION - ORIENTATION: ORIENTATION: LOWER;MID

## 2022-03-17 ASSESSMENT — PAIN DESCRIPTION - PAIN TYPE: TYPE: CHRONIC PAIN

## 2022-03-17 NOTE — PROGRESS NOTES
Called nurse to nurse to [facility] at [facility]. Faxed information to [phone].

## 2022-03-17 NOTE — PROGRESS NOTES
[facility]  Nephrology Progress Note  Patient's Name: Sushma Sykes    History of Present Illness from 3/10 Consult Note:  \"Claudia Foreman is a 78 y.o. female with a past medical history of breast CA, anemia, hypertension, hyperlipidemia, and glaucoma. She presented to [facility] ED on 3/8 following a fall. Per the ED provider note the patient had back pain/had fell. She was dialyzed on 3/9 at [facility]. Patient was transferred to [facility], as she can see neurosurgery here. Lab data today includes potassium 4.7, BUN 26, creatinine 4.7, and hemoglobin 11.7. We were consulted to see the patient for ESRD on HD MWF. Patient is known to our service and dialyzes at [address] MWF via a left AV fistula. At present, patient was seen and examined. She has family at the bedside. She reports that she had a fall after feeling weakness and pain in her back, on Tuesday morning. She explained that she was unable to ambulate at that time. She denies any current chest pain or shortness of breath. No abdominal pain, nausea, or vomiting. She denied any dizziness. \"    Subjective:    3/17: Patient was seen and examined. She reports that she feels [place]. \"  She has family at the bedside. She explained that she is still having some back pain. She also explained that she is having some abdominal pain, but denies any nausea. She denies any current chest pain or shortness of breath.     Allergies:  Furosemide    Current Medications:    latanoprost (XALATAN) 0.005 % ophthalmic solution 1 drop, Nightly  aspirin chewable tablet 81 mg, Daily  ticagrelor (BRILINTA) tablet 90 mg, BID  methylPREDNISolone acetate (DEPO-MEDROL) injection 80 mg, Once  naloxone (NARCAN) injection 0.4 mg, PRN  diphenhydrAMINE (BENADRYL) injection 25 mg, Q6H PRN  Vitamin D (CHOLECALCIFEROL) tablet 1,000 Units, Daily  sodium chloride flush 0.9 % injection 5-40 mL, 2 times per day  sodium chloride flush 0.9 % injection 5-40 mL, PRN  0.9 % sodium chloride infusion, PRN  ondansetron (ZOFRAN-ODT) disintegrating tablet 4 mg, Q8H PRN   Or  ondansetron (ZOFRAN) injection 4 mg, Q6H PRN  polyethylene glycol (GLYCOLAX) packet 17 g, Daily PRN  acetaminophen (TYLENOL) tablet 650 mg, Q6H PRN   Or  acetaminophen (TYLENOL) suppository 650 mg, Q6H PRN  HYDROcodone-acetaminophen (NORCO) 5-325 MG per tablet 1 tablet, Q6H PRN  insulin lispro (HUMALOG) injection vial 0-6 Units, TID WC  insulin lispro (HUMALOG) injection vial 0-3 Units, Nightly  allopurinol (ZYLOPRIM) tablet 100 mg, Daily  atorvastatin (LIPITOR) tablet 80 mg, Nightly  gabapentin (NEURONTIN) capsule 100 mg, TID  hydrALAZINE (APRESOLINE) tablet 10 mg, BID  isosorbide mononitrate (IMDUR) extended release tablet 30 mg, Daily  lanthanum (FOSRENOL) chewable tablet 1,000 mg, TID WC  metoprolol succinate (TOPROL XL) extended release tablet 25 mg, Daily  midodrine (PROAMATINE) tablet 5 mg, PRN  pantoprazole (PROTONIX) tablet 40 mg, QAM AC  traMADol (ULTRAM) tablet 50 mg, Q6H PRN      No current facility-administered medications on file prior to encounter. Current Outpatient Medications on File Prior to Encounter   Medication Sig Dispense Refill    atorvastatin (LIPITOR) 80 MG tablet Take 80 mg by mouth nightly      bumetanide (BUMEX) 2 MG tablet Take 2 mg by mouth three times a week *SUN-TUE-THUR*      isosorbide mononitrate (IMDUR) 30 MG extended release tablet Take 30 mg by mouth daily      insulin glargine (LANTUS) 100 UNIT/ML injection vial Inject 11 Units into the skin nightly      hydrALAZINE (APRESOLINE) 10 MG tablet Take 1 tablet by mouth 2 times daily 180 tablet 1    metoprolol succinate (TOPROL XL) 25 MG extended release tablet Take 1 tablet by mouth daily 90 tablet 1    gabapentin (NEURONTIN) 100 MG capsule Take 1 capsule by mouth 3 times daily for 180 days.  Intended supply: 30 days 90 capsule 5    lanthanum (FOSRENOL) 1000 MG chewable tablet Take 1,000 mg by mouth 3 times daily (with meals)       allopurinol (ZYLOPRIM) 100 MG tablet Take 1 tablet by mouth daily 90 tablet 1    ticagrelor (BRILINTA) 90 MG TABS tablet Take 1 tablet by mouth 2 times daily 180 tablet 1    aspirin 81 MG EC tablet Take 1 tablet by mouth daily 30 tablet 3    B Complex-C-Folic Acid (BONI CAPS) 1 MG CAPS Take 1 mg by mouth nightly       LUMIGAN 0.01 % SOLN ophthalmic drops Place 1 drop into the right eye nightly       COMBIGAN 0.2-0.5 % ophthalmic solution Place 1 drop into the right eye every 12 hours   7    lidocaine-prilocaine (EMLA) 2.5-2.5 % cream Apply topically See Admin Instructions APPLY SMALL AMOUNT TO ACCESS SITE (AVF) 30-45 MINUTES BEFORE DIALYSIS. COVER WITH OCCLUSIVE DRESSING ( SARAN WRAP)      midodrine (PROAMATINE) 5 MG tablet Take 1 tablet by mouth as needed (may repeat x1 durring HD for assymptomatic SBP<100) 90 tablet 0    omeprazole (PRILOSEC) 20 MG delayed release capsule Take 20 mg by mouth daily as needed (GI DISCOMFORT)         Physical exam:  Vitals:    03/17/22 0800   BP: (!) 121/51   Pulse: 69   Resp: 18   Temp: 98 °F (36.7 °C)   SpO2: 99%     Wt Readings from Last 3 Encounters:   03/16/22 179 lb 10.8 oz (81.5 kg)   03/09/22 184 lb 1.4 oz (83.5 kg)   01/20/22 181 lb (82.1 kg)     Temp Readings from Last 3 Encounters:   03/17/22 98 °F (36.7 °C) (Temporal)   03/09/22 98.2 °F (36.8 °C)   02/28/22 98.1 °F (36.7 °C)     BP Readings from Last 3 Encounters:   03/17/22 (!) 121/51   03/09/22 102/60   01/20/22 (!) 165/74     Pulse Readings from Last 3 Encounters:   03/17/22 69   03/09/22 74   01/20/22 62       General appearance: Awake, alert, no acute distress  Skin: Warm/dry; no rashes on exposed extremities  Neck: No JVD noted  Lungs: Clear bilaterally; no adventitious lung sounds; unlabored  Heart: Regular rate and rhythm. No rub  Abdomen: Soft, non-tender, nondistended.   Active bowel sounds  Extremities: RUE edema history of breast CA, left arm AVF  Neurologic: Awake, alert, answers questions appropriately    I/O last 3 completed shifts:   In: 600 [P.O.:300]  Out: 1500   I/O this shift:  In: 180 [P.O.:180]  Out: -     Data:   Labs:  Lab Results   Component Value Date     03/17/2022     03/16/2022     03/15/2022    K 5.0 03/17/2022    K 5.0 03/17/2022    K 5.2 (H) 03/16/2022    K 5.2 (H) 03/16/2022    CL 97 (L) 03/17/2022    CO2 26 03/17/2022    CO2 27 03/16/2022    CO2 26 03/15/2022    CREATININE 4.5 (H) 03/17/2022    CREATININE 6.5 (H) 03/16/2022    CREATININE 5.1 (H) 03/15/2022    BUN 28 (H) 03/17/2022    BUN 43 (H) 03/16/2022    BUN 31 (H) 03/15/2022    GLUCOSE 123 (H) 03/17/2022    GLUCOSE 213 (H) 03/16/2022    GLUCOSE 118 (H) 03/15/2022    PHOS 5.8 (H) 03/11/2022    PHOS 3.1 05/25/2021    PHOS 4.1 05/24/2021    WBC 9.6 03/17/2022    WBC 11.1 03/16/2022    WBC 7.0 03/15/2022    HGB 11.2 (L) 03/17/2022    HGB 10.6 (L) 03/16/2022    HGB 10.2 (L) 03/15/2022    HCT 36.0 03/17/2022    HCT 34.1 03/16/2022    HCT 33.5 (L) 03/15/2022    MCV 90.9 03/17/2022     03/17/2022     Lab Results   Component Value Date     03/17/2022    K 5.0 03/17/2022    K 5.0 03/17/2022    CL 97 03/17/2022    CO2 26 03/17/2022    BUN 28 03/17/2022    CREATININE 4.5 03/17/2022    GLUCOSE 123 03/17/2022    GLUCOSE 46 04/02/2012    CALCIUM 10.5 03/17/2022       Recent Labs     03/17/22  0548 03/16/22  0618 03/15/22  0629   WBC 9.6 11.1 7.0   HGB 11.2* 10.6* 10.2*   HCT 36.0 34.1 33.5*   MCV 90.9 91.7 92.5    254 225      Lab Results   Component Value Date    COLORU Yellow 11/07/2017    NITRU Negative 11/07/2017    GLUCOSEU Negative 11/07/2017    GLUCOSEU NEGATIVE 08/27/2011    KETUA Negative 11/07/2017    UROBILINOGEN 0.2 11/07/2017    BILIRUBINUR Negative 11/07/2017    BILIRUBINUR NEGATIVE 08/27/2011      Lab Results   Component Value Date    CALCIUM 10.5 (H) 03/17/2022    PHOS 5.8 (H) 03/11/2022      Lab Results   Component Value Date    IRON 33 (L) 11/09/2017    TIBC 222 (L) 11/09/2017    FERRITIN 334 11/09/2017     Imaging:  No results found. No results found for this or any previous visit. Assessment/ Plans:    1. ESRD on HD  OP [facility] MWF   left AV fistula  Continue HD MWF    2.  Back pain   S/p fall 3/8  CT lumbar spine 3/8 negative for fracture, severe central canal stenosis  High risk for surgery per Cardiology   S/p epidural catheter insertion/ nerve block 3/12  Neurosurgery following    3. Hypertension with ESRD  BP goal< 140/90  BP at goal  Follow on current regimen/with HD    4. Secondary hyperparathyroidism of renal origin  Lab work from 3/11 showed , Phos 5.8, vitamin D 28  Continue phos binder   Low phos diet   Continue vit d supplement   Follow    5. Hyperkalemia  in the setting of ESRD  Low potassium diet  Follow with HD    6. Anemia   Hgb target of 10-12  Hemoglobin 11.2  JAYSHREE if hemoglobin<10  Transfuse for hemoglobin<7  Follow    Niecy Pedro APRN - CNP     Patient seen and examined all key components of the physical performed independently , case discussed with NP, all pertinent labs and radiologic tests personally reviewed agree with above.       Gregorio Wright MD

## 2022-03-17 NOTE — CARE COORDINATION
3/17, SW spoke with Riccardo Young from [facility] on patient being approved-precert has been obtained. Transportation has been set up for a 5:30pm  today [facility] for patient to go to [facility]. Patient will need a negative COVID test prior to discharge. COVID test given to nursing. HENS, ambulance form, face sheet and envelope on soft chart. Those informed of patient leaving later today is:  Patient, nursing and Riccardo Young from [facility]. Patient to contact own family on own. SW to follow for further discharge planning needs.       Art Ley, [facility] Case Management  [phone]

## 2022-03-17 NOTE — PROGRESS NOTES
Hospitalist Progress Note      SYNOPSIS: Patient admitted on 3/9/2022 for low back pain. This is a 58-year-old black female who was transferred from [facility] because of back pain. Symptoms started few days ago when she took a step and fell on the ground. She denies lower extremity weakness. She denies chest pain, no hematuria dysuria hematemesis or hematochezia. She denies subjective fever or chills. Patient has a history of discectomy and a spinal cord stimulator placed for pain by Dr. Fe Quevedo. At the time of examination patient continues to complain of back pain without weakness. After prolonged discussion between family and neurosurgery, patient decided to have epidural nerve block. A temporary lumbar epidural catheter inserted using fluoroscopy on 3/12/2022. Patient received her epidural shot on 3/14 and epidural catheter was discontinued afterwards. Patient medically stable for discharge home on 3/16. SUBJECTIVE:    Patient seen and examined in her room. Patient seen and examined in dialysis unit. Back pain better but not completely resolved. Denies any chest pain, nausea, vomiting, shortness of breath. Records reviewed. Stable overnight. No other overnight issues reported. Temp (24hrs), Av.6 °F (36.4 °C), Min:97.2 °F (36.2 °C), Max:98.3 °F (36.8 °C)    DIET: ADULT DIET; Regular; Low Fat/Low Chol/High Fiber/2 gm Na; Low Potassium (Less than 3000 mg/day); Low Phosphorus (Less than 1000 mg)  CODE: Full Code    Intake/Output Summary (Last 24 hours) at 3/17/2022 0748  Last data filed at 3/16/2022 2147  Gross per 24 hour   Intake 480 ml   Output 1500 ml   Net -1020 ml       OBJECTIVE:    /79   Pulse 69   Temp 97.2 °F (36.2 °C) (Temporal)   Resp 17   Ht 5' 10\" (1.778 m)   Wt 179 lb 10.8 oz (81.5 kg)   SpO2 99%   BMI 25.78 kg/m²     General appearance: No apparent distress, appears stated age and cooperative. HEENT:  Conjunctivae/corneas clear.    Neck: Supple. No jugular venous distention. Respiratory: Clear to auscultation bilaterally, normal respiratory effort  Cardiovascular: Regular rate rhythm, normal S1-S2  Abdomen: Soft, nontender, nondistended  Musculoskeletal: No clubbing, cyanosis, no bilateral lower extremity edema. Brisk capillary refill. Skin:  No rashes  on visible skin  Neurologic: awake, alert and following commands     ASSESSMENT & PLAN:      1. Back pain with spinal stenosis: Cervical and lumbar spine CT consistent with  Severe central canal stenosis at L2-3.  Moderate stenosis at L1-2.   5.  Multilevel neural foraminal stenoses, worst (severe) at L2-3.  Moderate   to severe stenoses at L1-2.    Pain control as indicated. Neurosurgery consultation  Status post epidural nerve block on 3/14     2. End-stage renal disease on hemodialysis: Monday Wednesday Friday, consult nephrology for renal replacement therapy. Dialysis as per nephrology     3. Chronic anemia secondary to end-stage renal disease: Monitor H&H, continue JAYSHREE.     4.  Essential hypertension: Continue outpatient medications with hydralazine and metoprolol.     5. Hyperlipidemia: Continue statin     6. 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  Gout without acute attack: Continue allopurinol    8. History of coronary artery disease post PCI  Also have mitral valve papillary fibroblastoma  Patient will require future CABG x2 with resection of the MV fibroblastoma  Family requested more information regarding follow-up of cardiology care, requested cardiology team to talk with the patient     Diet: Diet NPO  Code Status: Full Code        DISPOSITION:   Medically stable for discharge. waiting for placement.     Medications:  REVIEWED DAILY    Infusion Medications    sodium chloride       Scheduled Medications    latanoprost  1 drop Right Eye Nightly    aspirin  81 mg Oral Daily    ticagrelor  90 mg Oral BID    methylPREDNISolone acetate  80 mg Epidural Once    Vitamin D  1,000 Units Oral Daily    sodium chloride flush  5-40 mL IntraVENous 2 times per day    insulin lispro  0-6 Units SubCUTAneous TID WC    insulin lispro  0-3 Units SubCUTAneous Nightly    allopurinol  100 mg Oral Daily    atorvastatin  80 mg Oral Nightly    gabapentin  100 mg Oral TID    hydrALAZINE  10 mg Oral BID    isosorbide mononitrate  30 mg Oral Daily    lanthanum  1,000 mg Oral TID WC    metoprolol succinate  25 mg Oral Daily    pantoprazole  40 mg Oral QAM AC     PRN Meds: naloxone, diphenhydrAMINE, sodium chloride flush, sodium chloride, ondansetron **OR** ondansetron, polyethylene glycol, acetaminophen **OR** acetaminophen, HYDROcodone 5 mg - acetaminophen, midodrine, traMADol    Labs:     Recent Labs     03/15/22  0629 03/16/22  0618 03/17/22  0548   WBC 7.0 11.1 9.6   HGB 10.2* 10.6* 11.2*   HCT 33.5* 34.1 36.0    254 269       Recent Labs     03/15/22  0629 03/15/22  0629 03/16/22  0618 03/17/22  0548     --  138 137   K 5.3*  5.3*   < > 5.2*  5.2* 5.0  5.0   CL 96*  --  96* 97*   CO2 26  --  27 26   BUN 31*  --  43* 28*   CREATININE 5.1*  --  6.5* 4.5*   CALCIUM 9.7  --  10.3* 10.5*    < > = values in this interval not displayed. Recent Labs     03/15/22  0629 03/16/22  0618 03/17/22  0548   PROT 6.2* 6.6 6.8   ALKPHOS 68 77 75   ALT 19 24 22   AST 21 22 21   BILITOT 0.5 0.4 0.6       No results for input(s): INR in the last 72 hours. No results for input(s): Ayla Meraz in the last 72 hours.     Chronic labs:    Lab Results   Component Value Date    CHOL 101 05/19/2021    TRIG 106 05/19/2021    HDL 38 05/19/2021    LDLCALC 42 05/19/2021    TSH 2.810 05/19/2021    INR 1.0 01/20/2022    LABA1C 5.7 09/07/2021       Radiology: REVIEWED DAILY    +++++++++++++++++++++++++++++++++++++++++++++++++  Fadumo Morse MD  [facility] [address], [place]  +++++++++++++++++++++++++++++++++++++++++++++++++  NOTE: This report was transcribed using voice recognition software. Every effort was made to ensure accuracy; however, inadvertent computerized transcription errors may be present.

## 2022-03-17 NOTE — PROGRESS NOTES
Occupational Therapy  OT BEDSIDE TREATMENT NOTE   [address] [address]  [address] [place] [address]      Date:3/17/2022  Patient Name: Ronnie Ring  MRN: [id number]  : 1955  Room: [address] [place] [address] [id number]     Referring Provider: Kana Cleaning MD  Specific Provider Orders/Date: OT eval and treat 03/10/22     Diagnosis: Lumbar stenosis without neurogenic claudication [M48.061]  Lumbar foraminal stenosis [M48.061]  Back pain [M54.9]   Pt admitted to hospital on 3/8/22 following fall - +back pain radiating to LE's  Per NS, pt decided against surgery, decided on epidural nerve block     Surgery / Procedure: Temporary Lumbar epidural catheter inserted using fluoroscopy on 3/12      Pertinent Medical History:  has a past medical history of Acute infection of bone ([address] Acute osteomyelitis of phalanx of left hand ([address] Anemia of chronic disease, Arthritis, Breast cancer ([address] CAD (coronary artery disease), Carpal tunnel syndrome, Chronic diastolic CHF (congestive heart failure) ([address] CKD (chronic kidney disease) stage 4, GFR 15-29 ml/min ([address] Diabetic retinopathy ([address] Glaucoma, Hemodialysis patient ([address] Hyperkalemia, diminished renal excretion, Hyperlipidemia, Hypertension, 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 Spinal cord stimulator        Precautions:  Fall Risk, spine neutrality for pain management, epidural, O2, TAPS, HD MWF     Assessment of current deficits    [x]? Functional mobility         [x]? ADLs           [x]? Strength                  []?Cognition    [x]? Functional transfers       [x]? IADLs         [x]? Safety Awareness   [x]? Endurance    []?  Fine Coordination [x]? Balance      []? Vision/perception   []? Sensation      []? Gross Motor Coordination          []? ROM           []? Delirium                   []? Motor Control      OT PLAN OF CARE   OT POC based on physician orders, patient diagnosis and results of clinical assessment     Frequency/Duration 2-3 days/wk for 2 weeks PRN   Specific OT Treatment Interventions to include:   * Instruction/training on adapted ADL techniques and AE recommendations to increase functional independence within precautions       * Training on energy conservation strategies, correct breathing pattern and techniques to improve independence/tolerance for self-care routine  * Functional transfer/mobility training/DME recommendations for increased independence, safety, and fall prevention  * Patient/Family education to increase follow through with safety techniques and functional independence  * Recommendation of environmental modifications for increased safety with functional transfers/mobility and ADLs  * Therapeutic exercise to improve motor endurance, ROM, and functional strength for ADLs/functional transfers  * Therapeutic activities to facilitate/challenge dynamic balance, stand tolerance for increased safety and independence with ADLs  * Therapeutic activities to facilitate gross/fine motor skills for increased independence with ADLs  * Positioning to improve skin integrity, interaction with environment and functional independence     Recommended Adaptive Equipment: TBD      Home Living: Pt lives alone in 1 floor condo.  0 NIKKO   Bathroom setup: tub/shower with grab bars and tub transfer bench, 3:1 over commode   Equipment owned: 3:1 BSC, w/w, cane     Prior Level of Function: independent with ADLs , assist with heavy IADLs, mod I for light IADL's; ambulated w/o AD   Driving: no - family/friends assist     Pain Level: Pt complained of back pain this session     Cognition: A&O: 4/4; Follows 1-2 step directions           Memory: good            Sequencing:  good            Problem solving:  fair            Judgement/safety:  fair              Functional Assessment:  AM-PAC Daily Activity Raw Score: 14/24    Initial Eval Status  Date: 3/13/22 Treatment Status  Date: 3/17/22 STGs = LTGs  Time frame: 10-14 days   Feeding Stand by Assist   Setup  Pt able to grasp cup, bring to mouth Modified [place]    Grooming Minimal Assist  SBA  Pt washed face, applied deodorant, brushed teeth seated upright in chair Modified [place]    UB Dressing Minimal Assist  Pravin  Federico/doff hospital gown seated EOB    maxA-brace  Federico LSO brace seated EOB Modified [place]    LB Dressing Dependent   Limited by back pain Pravin-socks  Pt doffed socks with use of reacher with assistance to place reacher. Pt then donned socks with use of sockaide, able to thread sock, place foot into sockaide and completely federico, with assistance to adjsut once donned    DNT pants this date Minimal Assist    Bathing Maximal Assist maxA  Pt completed sponge bathing task seated/standing, with pt able to wash of UB, with assistance to wash of LB, and stand to wash of buttocks/emma area Minimal Assist    Toileting Dependent  DNT  maxA per previous level Minimal Assist    Bed Mobility  Rolling: Mod A  Supine to sit: Moderate Assist   Sit to supine: Moderate Assist  Pravin  Supine<>EOB    HOB slightly elevated, use of bed rail, cueing on log roll technique    Rolling: SBA  Supine to sit: Stand by Assist   Sit to supine: Stand by Assist    Functional Transfers NT  Deferred secondary to increased pain during EOB tasks maxA  Sit to Stand  Stand to Sit    Stand Pivot Transfer EOB<>Chair with w.w.     Cueing for hand placement Stand by Assist    Functional Mobility NT maxA  Pt took of short steps during SPT with w.w., max cueing for safety and with walker management Stand by Assist    Balance Sitting:     Static:  SBA    Dynamic:Min A  Standing: NT Sitting EOB:  SBA    Standing:  maxA   Activity Tolerance Poor+  Limited by pain  Fair- Fair+   Visual/  Perceptual Glasses: yes                        Comments/Treatment/Education: Upon arrival pt supine in bed, agreeable to therapy, speaking with nursing okaying pt to be seen this session. Pt completed of bed mobility, functional mobility of short steps, transfers and ADL tasks this session. Pt was educated on role of OT,goals to be reached, importance of OOB activity, precautions to follow, safety and log roll technique with bed mobility, safety and hand placement with transfers, safety and walker management with functional mobility, and use of AE to assist with LB dressing tasks. Rest breaks provided throughout session as needed. At end of session, pt seated upright in chair, all tubes and lines intact, call light within reach. · Pt has made fair progress towards set goals.      · Continue with current plan of care focusing on increasing of independency with transfers and ADL tasks    Treatment Time In: 10:28am            Treatment Time Out: 11:07am             Treatment Charges: Mins Units   Ther Ex  [id number]     Manual Therapy [id number]     Thera Activities [id number] 11 1   ADL/Home Mgt [id number] 28 2   Neuro Re-ed [id number]     Group Therapy      Orthotic manage/training  [id number]     Non-Billable Time     Total Timed Treatment 39 3     Haydee Manjarrez DOLAN/L [id number]

## 2022-03-18 NOTE — DISCHARGE SUMMARY
Hospitalist Discharge Summary    Patient ID: Callie Sue   Patient : [id number]  Patient's PCP: Hugh Haro MD    Admit Date: 3/9/2022   Admitting Physician: Stefany Lozano MD    Discharge Date:  3/18/2022   Discharge Physician: Bright Burns MD   Discharge Condition: Stable  Discharge Disposition: [facility] course in brief:  (Please refer to daily progress notes for a comprehensive review of the hospitalization by requesting medical records)    Patient admitted on 3/9/2022 for low back pain.     This is a 79-year-old black female who was transferred from [facility] because of back pain.  Symptoms started few days ago when she took a step and fell on the ground.  She denies lower extremity weakness.  She denies chest pain, no hematuria dysuria hematemesis or hematochezia.  She denies subjective fever or chills.  Patient has a history of discectomy and a spinal cord stimulator placed for pain by Dr. Karma Hansen. At the time of examination patient continues to complain of back pain without weakness. After prolonged discussion between family and neurosurgery, patient decided to have epidural nerve block. A temporary lumbar epidural catheter inserted using fluoroscopy on 3/12/2022. Patient received her epidural shot on 3/14 and epidural catheter was discontinued afterwards.   Patient medically stable for discharge home on 3/16.         Consults:   IP CONSULT TO NEUROSURGERY  IP CONSULT TO NEPHROLOGY  IP CONSULT TO CARDIOLOGY  INPATIENT CONSULT TO ORTHOTIST/PROSTHETIST    Discharge Diagnoses:      1.  Back pain with spinal stenosis: Cervical and lumbar spine CT consistent with  Severe central canal stenosis at L2-3.  Moderate stenosis at L1-2.   5.  Multilevel neural foraminal stenoses, worst (severe) at L2-3.  Moderate   to severe stenoses at L1-2.    Pain control as indicated.  Neurosurgery consultation  Status post epidural nerve block on 3/14     2.  End-stage renal disease on hemodialysis: Monday Wednesday Friday, consult nephrology for renal replacement therapy. Dialysis as per nephrology     3.  Chronic anemia secondary to end-stage renal disease: Monitor H&H, continue JAYSHREE.     4.  Essential hypertension: Continue outpatient medications with hydralazine and metoprolol.     5.  Hyperlipidemia: Continue statin     6.  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  Gout without acute attack: Continue allopurinol     8. History of coronary artery disease post PCI  Also have mitral valve papillary fibroblastoma  Patient will require future CABG x2 with resection of the MV fibroblastoma  Family requested more information regarding follow-up of cardiology care, requested cardiology team to talk with the patient     Diet: Diet regular  Code Status: Full Code           Discharge Instructions / Follow up:    Future Appointments   Date Time Provider Dev Arizmendi   3/24/2022  8:20 AM Becky Toribio MD [facility]   4/13/2022  2:00 PM Aashish Patino MD [facility]   5/24/2022  7:30 AM MD ROSEY Rodriguez [facility]       Continued appropriate risk factor modification of blood pressure, diabetes and serum lipids will remain essential to reducing risk of future atherosclerotic development    Activity: activity as tolerated    Significant labs:  CBC:   Recent Labs     03/16/22 0618 03/17/22 0548 03/18/22 0339   WBC 11.1 9.6 10.0   RBC 3.72 3.96 3.67   HGB 10.6* 11.2* 10.4*   HCT 34.1 36.0 34.0   MCV 91.7 90.9 92.6   RDW 15.8* 15.9* 15.9*    269 308     BMP:   Recent Labs     03/16/22 0618 03/16/22 0618 03/17/22 0548 03/18/22 0339     --  137 134   K 5.2*  5.2*   < > 5.0  5.0 5.6*   CL 96*  --  97* 95*   CO2 27  --  26 25   BUN 43*  --  28* 41*   CREATININE 6.5*  --  4.5* 5.9*    < > = values in this interval not displayed.      LFT:  Recent Labs     03/16/22 0618 03/17/22 0548 03/18/22  0339   PROT 6.6 6.8 6.7   ALKPHOS 77 75 80   ALT 24 22 20   AST 22 21 22   BILITOT 0.4 0.6 0.4     PT/INR: No results for input(s): INR, APTT in the last 72 hours. BNP: No results for input(s): BNP in the last 72 hours. Hgb A1C:   Lab Results   Component Value Date    LABA1C 5.7 09/07/2021     Folate and B12:   Lab Results   Component Value Date    HGVRKKSS29 5984 (H) 02/17/2017   ,   Lab Results   Component Value Date    FOLATE >20.0 02/17/2017     Thyroid Studies:   Lab Results   Component Value Date    TSH 2.810 05/19/2021    R6IOFEP 5.0 12/24/2012       Urinalysis:    Lab Results   Component Value Date    NITRU Negative 11/07/2017    WBCUA 1-3 11/07/2017    WBCUA NONE 08/27/2011    BACTERIA MODERATE 11/07/2017    RBCUA 0-1 11/07/2017    RBCUA 1-3 02/13/2013    BLOODU Negative 11/07/2017    SPECGRAV 1.025 11/07/2017    GLUCOSEU Negative 11/07/2017    GLUCOSEU NEGATIVE 08/27/2011       Imaging:  CT Head WO Contrast    Result Date: 3/8/2022  EXAMINATION: CT OF THE HEAD WITHOUT CONTRAST; CT OF THE LUMBAR SPINE WITHOUT CONTRAST; CT OF THE CERVICAL SPINE WITHOUT CONTRAST  3/8/2022 5:17 pm TECHNIQUE: CT of the head was performed without the administration of intravenous contrast. Dose modulation, iterative reconstruction, and/or weight based adjustment of the mA/kV was utilized to reduce the radiation dose to as low as reasonably achievable.; CT of the lumbar spine was performed without the administration of intravenous contrast. Multiplanar reformatted images are provided for review. Adjustment of mA and/or kV according to patient size was utilized. Dose modulation, iterative reconstruction, and/or weight based adjustment of the mA/kV was utilized to reduce the radiation dose to as low as reasonably achievable.; CT of the cervical spine was performed without the administration of intravenous contrast. Multiplanar reformatted images are provided for review.  Dose modulation, iterative reconstruction, and/or weight based adjustment of the mA/kV was utilized to reduce the radiation dose to as low as reasonably achievable. COMPARISON: None. HISTORY: ORDERING SYSTEM PROVIDED HISTORY: fall TECHNOLOGIST PROVIDED HISTORY: Reason for exam:->fall Has a \"code stroke\" or \"stroke alert\" been called? ->No Decision Support Exception - unselect if not a suspected or confirmed emergency medical condition->Emergency Medical Condition (MA) FINDINGS: CT OF THE BRAIN: BRAIN/VENTRICLES: No mass effect, edema or hemorrhage is seen. No significant volume loss or chronic microvascular ischemic changes are appreciated. No hydrocephalus or extra-axial fluid is seen. ORBITS: Prosthetic lenses are seen bilaterally. Glaucoma treatment prostheses are seen along the lateral margins of the globes bilaterally. The orbits are otherwise unremarkable. SINUSES: The visualized paranasal sinuses and mastoid air cells demonstrate no acute abnormality. SOFT TISSUES/SKULL: No acute abnormality of the visualized skull or soft tissues. CT CERVICAL SPINE: BONES/ALIGNMENT: There is no acute fracture or traumatic malalignment. DEGENERATIVE CHANGES: Prominent loss of disc heights from C3-4 to C7-T1. Small disc osteophyte complexes at these levels resulting in mild central canal stenoses. Mild and moderate neural foraminal stenoses noted at multiple levels. SOFT TISSUES: There is no prevertebral soft tissue swelling. Multiple thyroid nodules are seen, the largest measuring approximately 3.2 cm in the left thyroid lobe. The nodule is only partially included in the field of view of this study. CT OF THE LUMBAR SPINE: BONES/ALIGNMENT: There no evidence of an acute fracture. Status post decompressive laminectomies with posterior interbody fusion from L3-L5. Prominent degenerative changes as described below SOFT TISSUES: No paraspinal mass identified. L1-L2: Prominent loss of disc height with a disc osteophyte. Moderate facet and ligamentous hypertrophy.   Moderate central canal stenosis, stenosis at L1-2. 5.  Multilevel neural foraminal stenoses, worst (severe) at L2-3. Moderate to severe stenoses at L1-2. RECOMMENDATIONS: Unavailable     CT Cervical Spine WO Contrast    Result Date: 3/8/2022  EXAMINATION: CT OF THE HEAD WITHOUT CONTRAST; CT OF THE LUMBAR SPINE WITHOUT CONTRAST; CT OF THE CERVICAL SPINE WITHOUT CONTRAST  3/8/2022 5:17 pm TECHNIQUE: CT of the head was performed without the administration of intravenous contrast. Dose modulation, iterative reconstruction, and/or weight based adjustment of the mA/kV was utilized to reduce the radiation dose to as low as reasonably achievable.; CT of the lumbar spine was performed without the administration of intravenous contrast. Multiplanar reformatted images are provided for review. Adjustment of mA and/or kV according to patient size was utilized. Dose modulation, iterative reconstruction, and/or weight based adjustment of the mA/kV was utilized to reduce the radiation dose to as low as reasonably achievable.; CT of the cervical spine was performed without the administration of intravenous contrast. Multiplanar reformatted images are provided for review. Dose modulation, iterative reconstruction, and/or weight based adjustment of the mA/kV was utilized to reduce the radiation dose to as low as reasonably achievable. COMPARISON: None. HISTORY: ORDERING SYSTEM PROVIDED HISTORY: fall TECHNOLOGIST PROVIDED HISTORY: Reason for exam:->fall Has a \"code stroke\" or \"stroke alert\" been called? ->No Decision Support Exception - unselect if not a suspected or confirmed emergency medical condition->Emergency Medical Condition (MA) FINDINGS: CT OF THE BRAIN: BRAIN/VENTRICLES: No mass effect, edema or hemorrhage is seen. No significant volume loss or chronic microvascular ischemic changes are appreciated. No hydrocephalus or extra-axial fluid is seen. ORBITS: Prosthetic lenses are seen bilaterally.   Glaucoma treatment prostheses are seen along the lateral margins of the globes bilaterally. The orbits are otherwise unremarkable. SINUSES: The visualized paranasal sinuses and mastoid air cells demonstrate no acute abnormality. SOFT TISSUES/SKULL: No acute abnormality of the visualized skull or soft tissues. CT CERVICAL SPINE: BONES/ALIGNMENT: There is no acute fracture or traumatic malalignment. DEGENERATIVE CHANGES: Prominent loss of disc heights from C3-4 to C7-T1. Small disc osteophyte complexes at these levels resulting in mild central canal stenoses. Mild and moderate neural foraminal stenoses noted at multiple levels. SOFT TISSUES: There is no prevertebral soft tissue swelling. Multiple thyroid nodules are seen, the largest measuring approximately 3.2 cm in the left thyroid lobe. The nodule is only partially included in the field of view of this study. CT OF THE LUMBAR SPINE: BONES/ALIGNMENT: There no evidence of an acute fracture. Status post decompressive laminectomies with posterior interbody fusion from L3-L5. Prominent degenerative changes as described below SOFT TISSUES: No paraspinal mass identified. L1-L2: Prominent loss of disc height with a disc osteophyte. Moderate facet and ligamentous hypertrophy. Moderate central canal stenosis, with narrowing of the AP diameter of the thecal sac in the midsagittal plane to 7 mm. Moderate to severe lateral recess and neural foraminal stenoses. L2-L3: Severe loss of disc height with chronic fracture along the inferior endplate the L2 vertebral body. Grade 2 anterolisthesis L2 over L3 by approximately 11 mm. Severe central canal stenosis, with narrowing of the AP diameter of the thecal sac in the midsagittal plane to 3 mm. Severe lateral recess and neural foraminal stenoses. L3-L4: Severe loss of disc height. Disc space allograft in situ demonstrating incorporation with the adjacent endplates. Small disc osteophyte complex. Decompressive laminectomies and medial facetectomies.  No significant central canal or lateral recess stenosis. Mild neural foraminal stenoses. L4-L5: Postoperative changes, with incorporation of the disc space allograft into the adjacent endplates. No significant central canal, lateral recess or neural foraminal stenoses. L5-S1: Prominent loss of disc height with a left-sided disc osteophyte complex. Mild facet hypertrophy. No central canal stenosis. Moderate stenosis of the left lateral recess. Moderate right and moderate to severe left neural foraminal stenoses. CT head without contrast: 1. No skull fracture or acute intracranial abnormality. CT cervical spine without contrast: 1. No fracture or joint dislocation is seen. 2. Degenerative changes, as described. 3. Thyroid nodules, with the dominant on the left measuring 3.2 cm. Per the recommendations of the [employer] of [employer], follow-up thyroid sonography is recommended. CT of the lumbar spine: 1. No acute fracture or bony destructive lesion. 2.  Status post decompressive laminectomies with posterior interbody fusion from L3-L5. 3. Severe loss of disc height with grade 2 anterolisthesis of L2 over L3 and chronic loss of vertebral body height at L2. 4. Severe central canal stenosis at L2-3. Moderate stenosis at L1-2. 5.  Multilevel neural foraminal stenoses, worst (severe) at L2-3. Moderate to severe stenoses at L1-2.  RECOMMENDATIONS: Unavailable     CT Lumbar Spine WO Contrast    Result Date: 3/8/2022  EXAMINATION: CT OF THE HEAD WITHOUT CONTRAST; CT OF THE LUMBAR SPINE WITHOUT CONTRAST; CT OF THE CERVICAL SPINE WITHOUT CONTRAST  3/8/2022 5:17 pm TECHNIQUE: CT of the head was performed without the administration of intravenous contrast. Dose modulation, iterative reconstruction, and/or weight based adjustment of the mA/kV was utilized to reduce the radiation dose to as low as reasonably achievable.; CT of the lumbar spine was performed without the administration of intravenous contrast. Multiplanar reformatted images are provided for review. Adjustment of mA and/or kV according to patient size was utilized. Dose modulation, iterative reconstruction, and/or weight based adjustment of the mA/kV was utilized to reduce the radiation dose to as low as reasonably achievable.; CT of the cervical spine was performed without the administration of intravenous contrast. Multiplanar reformatted images are provided for review. Dose modulation, iterative reconstruction, and/or weight based adjustment of the mA/kV was utilized to reduce the radiation dose to as low as reasonably achievable. COMPARISON: None. HISTORY: ORDERING SYSTEM PROVIDED HISTORY: fall TECHNOLOGIST PROVIDED HISTORY: Reason for exam:->fall Has a \"code stroke\" or \"stroke alert\" been called? ->No Decision Support Exception - unselect if not a suspected or confirmed emergency medical condition->Emergency Medical Condition (MA) FINDINGS: CT OF THE BRAIN: BRAIN/VENTRICLES: No mass effect, edema or hemorrhage is seen. No significant volume loss or chronic microvascular ischemic changes are appreciated. No hydrocephalus or extra-axial fluid is seen. ORBITS: Prosthetic lenses are seen bilaterally. Glaucoma treatment prostheses are seen along the lateral margins of the globes bilaterally. The orbits are otherwise unremarkable. SINUSES: The visualized paranasal sinuses and mastoid air cells demonstrate no acute abnormality. SOFT TISSUES/SKULL: No acute abnormality of the visualized skull or soft tissues. CT CERVICAL SPINE: BONES/ALIGNMENT: There is no acute fracture or traumatic malalignment. DEGENERATIVE CHANGES: Prominent loss of disc heights from C3-4 to C7-T1. Small disc osteophyte complexes at these levels resulting in mild central canal stenoses. Mild and moderate neural foraminal stenoses noted at multiple levels. SOFT TISSUES: There is no prevertebral soft tissue swelling.   Multiple thyroid nodules are seen, the largest measuring approximately 3.2 cm in the left thyroid lobe.  The nodule is only partially included in the field of view of this study. CT OF THE LUMBAR SPINE: BONES/ALIGNMENT: There no evidence of an acute fracture. Status post decompressive laminectomies with posterior interbody fusion from L3-L5. Prominent degenerative changes as described below SOFT TISSUES: No paraspinal mass identified. L1-L2: Prominent loss of disc height with a disc osteophyte. Moderate facet and ligamentous hypertrophy. Moderate central canal stenosis, with narrowing of the AP diameter of the thecal sac in the midsagittal plane to 7 mm. Moderate to severe lateral recess and neural foraminal stenoses. L2-L3: Severe loss of disc height with chronic fracture along the inferior endplate the L2 vertebral body. Grade 2 anterolisthesis L2 over L3 by approximately 11 mm. Severe central canal stenosis, with narrowing of the AP diameter of the thecal sac in the midsagittal plane to 3 mm. Severe lateral recess and neural foraminal stenoses. L3-L4: Severe loss of disc height. Disc space allograft in situ demonstrating incorporation with the adjacent endplates. Small disc osteophyte complex. Decompressive laminectomies and medial facetectomies. No significant central canal or lateral recess stenosis. Mild neural foraminal stenoses. L4-L5: Postoperative changes, with incorporation of the disc space allograft into the adjacent endplates. No significant central canal, lateral recess or neural foraminal stenoses. L5-S1: Prominent loss of disc height with a left-sided disc osteophyte complex. Mild facet hypertrophy. No central canal stenosis. Moderate stenosis of the left lateral recess. Moderate right and moderate to severe left neural foraminal stenoses. CT head without contrast: 1. No skull fracture or acute intracranial abnormality. CT cervical spine without contrast: 1. No fracture or joint dislocation is seen. 2. Degenerative changes, as described.  3. Thyroid nodules, with the dominant on the left measuring 3.2 cm. Per the recommendations of the [employer] of [employer], follow-up thyroid sonography is recommended. CT of the lumbar spine: 1. No acute fracture or bony destructive lesion. 2.  Status post decompressive laminectomies with posterior interbody fusion from L3-L5. 3. Severe loss of disc height with grade 2 anterolisthesis of L2 over L3 and chronic loss of vertebral body height at L2. 4. Severe central canal stenosis at L2-3. Moderate stenosis at L1-2. 5.  Multilevel neural foraminal stenoses, worst (severe) at L2-3. Moderate to severe stenoses at L1-2. RECOMMENDATIONS: Unavailable     FL NERVE BLOCK LUMBOSACRAL 1ST    Result Date: 3/14/2022  EXAMINATION: LUMBAR EPIDURAL NERVE BLOCK WITH FLUOROSCOPY 3/12/2022 10:00 am TECHNIQUE: Fluoroscopy was provided by the radiology department for the procedure. The radiologist was not present during examination. FLUOROSCOPY DOSE AND TYPE OR TIME AND EXPOSURES: Images: 1 Fluoroscopic time: 0.4 minutes Total dose: 16 mGy COMPARISON: CT lumbar spine 03/08/2022 the HISTORY: Intraprocedural imaging. FINDINGS: Lumbar epidural nerve block performed by Dr. Lainez Staff with fluoroscopic guidance. The radiologist was not present. 1.  Lumbar epidural nerve block performed by Dr. Lainez Staff. 2.  Please see separate procedure report for details. Discharge Medications:      Medication List      START taking these medications    HYDROcodone-acetaminophen 5-325 MG per tablet  Commonly known as: NORCO  Take 1 tablet by mouth every 6 hours as needed for Pain for up to 3 days.         CONTINUE taking these medications    allopurinol 100 MG tablet  Commonly known as: ZYLOPRIM  Take 1 tablet by mouth daily     aspirin 81 MG EC tablet  Take 1 tablet by mouth daily     atorvastatin 80 MG tablet  Commonly known as: LIPITOR     Bumex 2 MG tablet  Generic drug: bumetanide     Combigan 0.2-0.5 % ophthalmic solution  Generic drug: brimonidine-timolol     gabapentin 100 MG capsule  Commonly known as: NEURONTIN  Take 1 capsule by mouth 3 times daily for 180 days. Intended supply: 30 days     hydrALAZINE 10 MG tablet  Commonly known as: APRESOLINE  Take 1 tablet by mouth 2 times daily     insulin glargine 100 UNIT/ML injection vial  Commonly known as: LANTUS     isosorbide mononitrate 30 MG extended release tablet  Commonly known as: IMDUR     lanthanum 1000 MG chewable tablet  Commonly known as: FOSRENOL     lidocaine-prilocaine 2.5-2.5 % cream  Commonly known as: EMLA     Lumigan 0.01 % Soln ophthalmic drops  Generic drug: bimatoprost     metoprolol succinate 25 MG extended release tablet  Commonly known as: TOPROL XL  Take 1 tablet by mouth daily     midodrine 5 MG tablet  Commonly known as: PROAMATINE  Take 1 tablet by mouth as needed (may repeat x1 durring HD for assymptomatic SBP<100)     omeprazole 20 MG delayed release capsule  Commonly known as: PRILOSEC     Jose Caps 1 MG Caps     ticagrelor 90 MG Tabs tablet  Commonly known as: BRILINTA  Take 1 tablet by mouth 2 times daily           Where to Get Your Medications      You can get these medications from any pharmacy    Bring a paper prescription for each of these medications  · HYDROcodone-acetaminophen 5-325 MG per tablet         Time Spent on discharge is more than 45 minutes in the examination, evaluation, counseling and review of medications and discharge plan.    +++++++++++++++++++++++++++++++++++++++++++++++++  Alhaji Lovett MD  [facility] - [address] [place] [address]  +++++++++++++++++++++++++++++++++++++++++++++++++  NOTE: This report was transcribed using voice recognition software. Every effort was made to ensure accuracy; however, inadvertent computerized transcription errors may be present.

## 2022-03-24 ENCOUNTER — OFFICE VISIT (OUTPATIENT)
Dept: NEUROLOGY | Age: 67
End: 2022-03-24
Payer: COMMERCIAL

## 2022-03-24 VITALS
HEIGHT: 70 IN | BODY MASS INDEX: 24.62 KG/M2 | RESPIRATION RATE: 18 BRPM | OXYGEN SATURATION: 96 % | TEMPERATURE: 97.2 F | SYSTOLIC BLOOD PRESSURE: 103 MMHG | DIASTOLIC BLOOD PRESSURE: 32 MMHG | WEIGHT: 172 LBS | HEART RATE: 65 BPM

## 2022-03-24 DIAGNOSIS — Z76.0 MEDICATION REFILL: ICD-10-CM

## 2022-03-24 PROCEDURE — 1090F PRES/ABSN URINE INCON ASSESS: CPT | Performed by: PSYCHIATRY & NEUROLOGY

## 2022-03-24 PROCEDURE — G8427 DOCREV CUR MEDS BY ELIG CLIN: HCPCS | Performed by: PSYCHIATRY & NEUROLOGY

## 2022-03-24 PROCEDURE — G8484 FLU IMMUNIZE NO ADMIN: HCPCS | Performed by: PSYCHIATRY & NEUROLOGY

## 2022-03-24 PROCEDURE — 99215 OFFICE O/P EST HI 40 MIN: CPT | Performed by: PSYCHIATRY & NEUROLOGY

## 2022-03-24 PROCEDURE — G8420 CALC BMI NORM PARAMETERS: HCPCS | Performed by: PSYCHIATRY & NEUROLOGY

## 2022-03-24 PROCEDURE — G8400 PT W/DXA NO RESULTS DOC: HCPCS | Performed by: PSYCHIATRY & NEUROLOGY

## 2022-03-24 PROCEDURE — 1123F ACP DISCUSS/DSCN MKR DOCD: CPT | Performed by: PSYCHIATRY & NEUROLOGY

## 2022-03-24 PROCEDURE — 1036F TOBACCO NON-USER: CPT | Performed by: PSYCHIATRY & NEUROLOGY

## 2022-03-24 PROCEDURE — 4040F PNEUMOC VAC/ADMIN/RCVD: CPT | Performed by: PSYCHIATRY & NEUROLOGY

## 2022-03-24 PROCEDURE — 3017F COLORECTAL CA SCREEN DOC REV: CPT | Performed by: PSYCHIATRY & NEUROLOGY

## 2022-03-24 PROCEDURE — 1111F DSCHRG MED/CURRENT MED MERGE: CPT | Performed by: PSYCHIATRY & NEUROLOGY

## 2022-03-24 RX ORDER — GABAPENTIN 100 MG/1
200 CAPSULE ORAL 3 TIMES DAILY
Qty: 180 CAPSULE | Refills: 5 | Status: SHIPPED
Start: 2022-03-24 | End: 2022-08-01

## 2022-03-24 NOTE — PROGRESS NOTES
This 67-year-old right-handed -American woman was referred for evaluation and management of burning sensations, especially during hemodialysis. She continued as an excellent historian. Her daughter provided an excellent history as well. Her medications were gabapentin, bumetanide, lanthanum, isosorbide, allopurinol, atorvastatin, metoprolol, ticagrelor, hydralazine, aspirin, midodrine, insulins, omeprazole and multivitamins. Her medical history was remarkable for hypertension, well-controlled, renal failure secondary to multiple causes including sepsis on hemodialysis, coronary artery disease, status post 2 stents, papillary fibroblastoma, pending surgery, ventricular fibrillation, degenerative joint disease and cancers or breast, cervix and bladder. She denied strokes, seizures, other heart disorders, lung disease, gastrointestinal issues, other connective tissue disorders, skin abnormalities or depression. Her neurological problems began over 4 years ago, with starting hemodialysis. During that procedure, she experienced burning sensations in her legs and then throughout her entire body. These painful feelings abated after dialysis. She received 100 mg of gabapentin prior to dialysis, provided no relief. She also experienced burning sensations in both feet and lower calves throughout the day and night. The decreased sensations in both calves and feet had not worsened. She felt weaker overall due to her hospitalizations and medical issues as noted below. She also reported burning sensations along her left groin extending into her back she again denied headaches. These pains were moderated with gabapentin 100 mg 3 times daily. She was willing to try increasing doses to further moderate these discomforts.     There was no weakness, clumsiness, memory decline, speech difficulties, swallowing or chewing abnormalities, bowel incontinence, seizure-like activity or loss of consciousness. Since her last visit, she developed shingles of her left shoulder and increasing back pains. Cardiac surgery was still pending. She also lost her distal phalanx of her left third finger from osteomyelitis. She was currently in rehabilitation to improve her strength for that surgery. She also stumbled, injuring her back once again. She was not walking at this time. She received her COVID-19 vaccinations and booster. Her blood sugars were better controlled, with a recent hemoglobin A1c of 5.5. She denied other medical issues. Review of systems was otherwise unremarkable. Physical examination displayed stable vital signs. Her blood pressure was 103/72. She was an elderly woman in no acute distress, who was alert, cooperative and oriented. She remained very pleasant. She was now wheelchair-bound. Her skin was unremarkable. Head examination was unrevealing. Her spine displayed well-healed lumbosacral laminectomy scars, but no tenderness or deformities. Her lungs were clear to auscultation. Cardiac testing proved unrevealing. Peripheral pulses were decreased throughout, with left femoral bruit. Her limbs revealed an amputated right big toe, distal phalanx of her left third digit and cool feet. There was again no cyanosis, but minimal edema of her left ankle and foot. Neurological examination produced an intact mental status. Cranial nerve testing found a left esotropia, but no other abnormalities. There remained atrophy in both hand and foot intrinsics as well as both calves. Tone was intact. I found 5/5 strength throughout. There were no reflexes. Pinprick and vibration were decreased below both knees. Joint position sense was severely impaired in the toes and ankles. Finger-to-nose and heel-to-shin testings were performed well. She did not walk today. Laboratory data included a recent hemoglobin A1c level of 5.5. CMP was unremarkable except for a GFR of 9. CBC with differential revealed a moderate chronic anemia. Electrodiagnostic studies found diffuse symmetrical sensorimotor peripheral neuropathy of the axonal degenerative type, of marked severity and left cervical radiculopathy. This individual presents with burning sensations especially during dialysis. She suffers from a profound peripheral neuropathy, from longstanding end-stage renal disease and diabetes. Dialysis may be aggravating this neuropathy. I will increase gabapentin to 200 mg 3 times daily. Duloxetine may also be used. Her L1 radicular pains were likely related to additional lumbosacral trauma. Hopefully, these burnings will be proved with increasing doses of gabapentin. She is otherwise stable neurologically. She is also stable medically despite her multiple comorbidities and recent issues. She will return in 4 months, to see my nurse practitioners. Gabapentin was increased to 200 mg 3 times daily. She will report back in 1 month. She will call at any time if problems arise. I spent 40 minutes with the patient with over 50 % spent in counseling and disease management. All patient issues were addressed and all questions were answered.

## 2022-04-04 ENCOUNTER — TELEPHONE (OUTPATIENT)
Dept: FAMILY MEDICINE CLINIC | Age: 67
End: 2022-04-04

## 2022-04-04 NOTE — TELEPHONE ENCOUNTER
Nacho ALICEA calling, patient d/c today from [facility], wanting to know if you will follow for home care.

## 2022-04-05 ENCOUNTER — TELEPHONE (OUTPATIENT)
Dept: ORTHOPEDIC SURGERY | Age: 67
End: 2022-04-05

## 2022-04-05 NOTE — TELEPHONE ENCOUNTER
Patient has an upcoming appointment next Wednesday, however doesn't look like she had her CT or MRI done. Want to still see her and order an Xray?     Future Appointments   Date Time Provider Dev Gustafsoni   4/13/2022  2:00 PM Sadie Suarez MD [facility]   5/24/2022  7:30 AM Staci Mendiola MD [facility]   8/2/2022  8:40 AM GOYO Finn - [facility]

## 2022-04-05 NOTE — TELEPHONE ENCOUNTER
Does not need CT or MRI. She was to follow up as needed. If she is doing well without any complaints, she does not have to keep that appt.

## 2022-04-12 ENCOUNTER — TELEPHONE (OUTPATIENT)
Dept: FAMILY MEDICINE CLINIC | Age: 67
End: 2022-04-12

## 2022-04-12 NOTE — TELEPHONE ENCOUNTER
Patient called and stated that Dr. Jason Montoya will not prescribe her anymore Norco but she is in a lot of pain and he told her that she needed to contact you. Will you prescribe anymore for her? She uses [facility] [address]

## 2022-04-12 NOTE — TELEPHONE ENCOUNTER
Please let her know we can discuss more at her appointment tomorrow.  It seems Ashleighmaura Tavarez prescribed these for her in the past-I think it would be most reasonable to go back to her for it, but if she needs a new referral or a bridge in the mean time we can discuss tomorrow

## 2022-04-12 NOTE — TELEPHONE ENCOUNTER
If she is in 10/10 pain and unable to get out of bed she should be evaluated in the emergency room. I do not feel comfortable prescribing these for her without evaluating her.

## 2022-04-13 ENCOUNTER — ANESTHESIA EVENT (OUTPATIENT)
Dept: OPERATING ROOM | Age: 67
DRG: 477 | End: 2022-04-13
Payer: COMMERCIAL

## 2022-04-13 ENCOUNTER — HOSPITAL ENCOUNTER (INPATIENT)
Age: 67
LOS: 9 days | Discharge: NURSING, REHAB OR LONG-TERM CARE | DRG: 477 | End: 2022-04-22
Attending: EMERGENCY MEDICINE | Admitting: INTERNAL MEDICINE
Payer: COMMERCIAL

## 2022-04-13 ENCOUNTER — APPOINTMENT (OUTPATIENT)
Dept: CT IMAGING | Age: 67
DRG: 477 | End: 2022-04-13
Payer: COMMERCIAL

## 2022-04-13 DIAGNOSIS — M54.59 INTRACTABLE LOW BACK PAIN: Primary | ICD-10-CM

## 2022-04-13 DIAGNOSIS — R26.2 AMBULATORY DYSFUNCTION: ICD-10-CM

## 2022-04-13 LAB
ALBUMIN SERPL-MCNC: 3.1 G/DL (ref 3.5–5.2)
ALP BLD-CCNC: 109 U/L (ref 35–104)
ALT SERPL-CCNC: 22 U/L (ref 0–32)
ANION GAP SERPL CALCULATED.3IONS-SCNC: 11 MMOL/L (ref 7–16)
AST SERPL-CCNC: 22 U/L (ref 0–31)
BASOPHILS ABSOLUTE: 0.03 E9/L (ref 0–0.2)
BASOPHILS RELATIVE PERCENT: 0.4 % (ref 0–2)
BILIRUB SERPL-MCNC: 0.3 MG/DL (ref 0–1.2)
BUN BLDV-MCNC: 32 MG/DL (ref 6–23)
C-REACTIVE PROTEIN: 7.7 MG/DL (ref 0–0.4)
CALCIUM SERPL-MCNC: 8.8 MG/DL (ref 8.6–10.2)
CHLORIDE BLD-SCNC: 100 MMOL/L (ref 98–107)
CO2: 29 MMOL/L (ref 22–29)
CREAT SERPL-MCNC: 5.2 MG/DL (ref 0.5–1)
EKG ATRIAL RATE: 57 BPM
EKG P AXIS: 38 DEGREES
EKG P-R INTERVAL: 168 MS
EKG Q-T INTERVAL: 476 MS
EKG QRS DURATION: 98 MS
EKG QTC CALCULATION (BAZETT): 463 MS
EKG R AXIS: 75 DEGREES
EKG T AXIS: -11 DEGREES
EKG VENTRICULAR RATE: 57 BPM
EOSINOPHILS ABSOLUTE: 0.11 E9/L (ref 0.05–0.5)
EOSINOPHILS RELATIVE PERCENT: 1.4 % (ref 0–6)
GFR AFRICAN AMERICAN: 10
GFR NON-AFRICAN AMERICAN: 10 ML/MIN/1.73
GLUCOSE BLD-MCNC: 72 MG/DL (ref 74–99)
HCT VFR BLD CALC: 32.6 % (ref 34–48)
HEMOGLOBIN: 9.5 G/DL (ref 11.5–15.5)
IMMATURE GRANULOCYTES #: 0.09 E9/L
IMMATURE GRANULOCYTES %: 1.1 % (ref 0–5)
LYMPHOCYTES ABSOLUTE: 1.12 E9/L (ref 1.5–4)
LYMPHOCYTES RELATIVE PERCENT: 14.2 % (ref 20–42)
MCH RBC QN AUTO: 27.1 PG (ref 26–35)
MCHC RBC AUTO-ENTMCNC: 29.1 % (ref 32–34.5)
MCV RBC AUTO: 92.9 FL (ref 80–99.9)
METER GLUCOSE: 135 MG/DL (ref 74–99)
MONOCYTES ABSOLUTE: 0.69 E9/L (ref 0.1–0.95)
MONOCYTES RELATIVE PERCENT: 8.7 % (ref 2–12)
NEUTROPHILS ABSOLUTE: 5.87 E9/L (ref 1.8–7.3)
NEUTROPHILS RELATIVE PERCENT: 74.2 % (ref 43–80)
PDW BLD-RTO: 17.2 FL (ref 11.5–15)
PLATELET # BLD: 346 E9/L (ref 130–450)
PMV BLD AUTO: 10.1 FL (ref 7–12)
POTASSIUM REFLEX MAGNESIUM: 4.6 MMOL/L (ref 3.5–5)
PRO-BNP: ABNORMAL PG/ML (ref 0–125)
PROCALCITONIN: 0.67 NG/ML (ref 0–0.08)
RBC # BLD: 3.51 E12/L (ref 3.5–5.5)
SEDIMENTATION RATE, ERYTHROCYTE: 81 MM/HR (ref 0–20)
SODIUM BLD-SCNC: 140 MMOL/L (ref 132–146)
TOTAL PROTEIN: 6.5 G/DL (ref 6.4–8.3)
TROPONIN, HIGH SENSITIVITY: 213 NG/L (ref 0–9)
TROPONIN, HIGH SENSITIVITY: 219 NG/L (ref 0–9)
WBC # BLD: 7.9 E9/L (ref 4.5–11.5)

## 2022-04-13 PROCEDURE — 2060000000 HC ICU INTERMEDIATE R&B

## 2022-04-13 PROCEDURE — 72131 CT LUMBAR SPINE W/O DYE: CPT

## 2022-04-13 PROCEDURE — 83880 ASSAY OF NATRIURETIC PEPTIDE: CPT

## 2022-04-13 PROCEDURE — 85025 COMPLETE CBC W/AUTO DIFF WBC: CPT

## 2022-04-13 PROCEDURE — 36415 COLL VENOUS BLD VENIPUNCTURE: CPT

## 2022-04-13 PROCEDURE — 84145 PROCALCITONIN (PCT): CPT

## 2022-04-13 PROCEDURE — 90935 HEMODIALYSIS ONE EVALUATION: CPT | Performed by: INTERNAL MEDICINE

## 2022-04-13 PROCEDURE — 6370000000 HC RX 637 (ALT 250 FOR IP): Performed by: INTERNAL MEDICINE

## 2022-04-13 PROCEDURE — 5A1D70Z PERFORMANCE OF URINARY FILTRATION, INTERMITTENT, LESS THAN 6 HOURS PER DAY: ICD-10-PCS | Performed by: INTERNAL MEDICINE

## 2022-04-13 PROCEDURE — 84484 ASSAY OF TROPONIN QUANT: CPT

## 2022-04-13 PROCEDURE — 82962 GLUCOSE BLOOD TEST: CPT

## 2022-04-13 PROCEDURE — 86140 C-REACTIVE PROTEIN: CPT

## 2022-04-13 PROCEDURE — 93005 ELECTROCARDIOGRAM TRACING: CPT | Performed by: GENERAL PRACTICE

## 2022-04-13 PROCEDURE — 93010 ELECTROCARDIOGRAM REPORT: CPT | Performed by: INTERNAL MEDICINE

## 2022-04-13 PROCEDURE — 87040 BLOOD CULTURE FOR BACTERIA: CPT

## 2022-04-13 PROCEDURE — 80053 COMPREHEN METABOLIC PANEL: CPT

## 2022-04-13 PROCEDURE — 85651 RBC SED RATE NONAUTOMATED: CPT

## 2022-04-13 PROCEDURE — 99285 EMERGENCY DEPT VISIT HI MDM: CPT

## 2022-04-13 PROCEDURE — S5553 INSULIN LONG ACTING 5 U: HCPCS | Performed by: INTERNAL MEDICINE

## 2022-04-13 RX ORDER — BUMETANIDE 1 MG/1
2 TABLET ORAL
Status: DISCONTINUED | OUTPATIENT
Start: 2022-04-14 | End: 2022-04-22 | Stop reason: HOSPADM

## 2022-04-13 RX ORDER — ACETAMINOPHEN 325 MG/1
650 TABLET ORAL EVERY 4 HOURS PRN
Status: DISCONTINUED | OUTPATIENT
Start: 2022-04-13 | End: 2022-04-22 | Stop reason: HOSPADM

## 2022-04-13 RX ORDER — ISOSORBIDE MONONITRATE 30 MG/1
30 TABLET, EXTENDED RELEASE ORAL DAILY
Status: DISCONTINUED | OUTPATIENT
Start: 2022-04-14 | End: 2022-04-22 | Stop reason: HOSPADM

## 2022-04-13 RX ORDER — ATORVASTATIN CALCIUM 40 MG/1
80 TABLET, FILM COATED ORAL NIGHTLY
Status: DISCONTINUED | OUTPATIENT
Start: 2022-04-13 | End: 2022-04-22 | Stop reason: HOSPADM

## 2022-04-13 RX ORDER — POLYETHYLENE GLYCOL 3350 17 G/17G
17 POWDER, FOR SOLUTION ORAL DAILY PRN
Status: DISCONTINUED | OUTPATIENT
Start: 2022-04-13 | End: 2022-04-22 | Stop reason: HOSPADM

## 2022-04-13 RX ORDER — LANOLIN ALCOHOL/MO/W.PET/CERES
3 CREAM (GRAM) TOPICAL NIGHTLY PRN
Status: DISCONTINUED | OUTPATIENT
Start: 2022-04-13 | End: 2022-04-22 | Stop reason: HOSPADM

## 2022-04-13 RX ORDER — NICOTINE POLACRILEX 4 MG
15 LOZENGE BUCCAL PRN
Status: DISCONTINUED | OUTPATIENT
Start: 2022-04-13 | End: 2022-04-22 | Stop reason: HOSPADM

## 2022-04-13 RX ORDER — ALLOPURINOL 100 MG/1
100 TABLET ORAL DAILY
Status: DISCONTINUED | OUTPATIENT
Start: 2022-04-14 | End: 2022-04-22 | Stop reason: HOSPADM

## 2022-04-13 RX ORDER — INSULIN GLARGINE-YFGN 100 [IU]/ML
5 INJECTION, SOLUTION SUBCUTANEOUS NIGHTLY
Status: DISCONTINUED | OUTPATIENT
Start: 2022-04-13 | End: 2022-04-22 | Stop reason: HOSPADM

## 2022-04-13 RX ORDER — ONDANSETRON 2 MG/ML
4 INJECTION INTRAMUSCULAR; INTRAVENOUS EVERY 6 HOURS PRN
Status: DISCONTINUED | OUTPATIENT
Start: 2022-04-13 | End: 2022-04-22 | Stop reason: HOSPADM

## 2022-04-13 RX ORDER — MIDODRINE HYDROCHLORIDE 5 MG/1
5 TABLET ORAL DAILY PRN
Status: ON HOLD | COMMUNITY
End: 2022-07-21 | Stop reason: HOSPADM

## 2022-04-13 RX ORDER — BRIMONIDINE TARTRATE 2 MG/ML
1 SOLUTION/ DROPS OPHTHALMIC 2 TIMES DAILY
Status: DISCONTINUED | OUTPATIENT
Start: 2022-04-13 | End: 2022-04-22 | Stop reason: HOSPADM

## 2022-04-13 RX ORDER — OXYCODONE HYDROCHLORIDE AND ACETAMINOPHEN 5; 325 MG/1; MG/1
1 TABLET ORAL EVERY 4 HOURS PRN
Status: DISCONTINUED | OUTPATIENT
Start: 2022-04-13 | End: 2022-04-22 | Stop reason: HOSPADM

## 2022-04-13 RX ORDER — MIDODRINE HYDROCHLORIDE 5 MG/1
5 TABLET ORAL DAILY PRN
Status: DISCONTINUED | OUTPATIENT
Start: 2022-04-13 | End: 2022-04-22 | Stop reason: HOSPADM

## 2022-04-13 RX ORDER — TIMOLOL MALEATE 5 MG/ML
1 SOLUTION/ DROPS OPHTHALMIC 2 TIMES DAILY
Status: DISCONTINUED | OUTPATIENT
Start: 2022-04-13 | End: 2022-04-22 | Stop reason: HOSPADM

## 2022-04-13 RX ORDER — LATANOPROST 50 UG/ML
1 SOLUTION/ DROPS OPHTHALMIC NIGHTLY
Status: DISCONTINUED | OUTPATIENT
Start: 2022-04-13 | End: 2022-04-22 | Stop reason: HOSPADM

## 2022-04-13 RX ORDER — LIDOCAINE AND PRILOCAINE 25; 25 MG/G; MG/G
CREAM TOPICAL PRN
Status: DISCONTINUED | OUTPATIENT
Start: 2022-04-13 | End: 2022-04-22 | Stop reason: HOSPADM

## 2022-04-13 RX ORDER — BRIMONIDINE TARTRATE, TIMOLOL MALEATE 2; 5 MG/ML; MG/ML
1 SOLUTION/ DROPS OPHTHALMIC EVERY 12 HOURS
Status: DISCONTINUED | OUTPATIENT
Start: 2022-04-13 | End: 2022-04-13 | Stop reason: CLARIF

## 2022-04-13 RX ORDER — METOPROLOL SUCCINATE 25 MG/1
25 TABLET, EXTENDED RELEASE ORAL DAILY
Status: DISCONTINUED | OUTPATIENT
Start: 2022-04-14 | End: 2022-04-22 | Stop reason: HOSPADM

## 2022-04-13 RX ORDER — DEXTROSE MONOHYDRATE 25 G/50ML
12.5 INJECTION, SOLUTION INTRAVENOUS PRN
Status: DISCONTINUED | OUTPATIENT
Start: 2022-04-13 | End: 2022-04-22 | Stop reason: HOSPADM

## 2022-04-13 RX ORDER — PANTOPRAZOLE SODIUM 40 MG/1
40 TABLET, DELAYED RELEASE ORAL
Status: DISCONTINUED | OUTPATIENT
Start: 2022-04-14 | End: 2022-04-22 | Stop reason: HOSPADM

## 2022-04-13 RX ORDER — LANTHANUM CARBONATE 500 MG/1
1000 TABLET, CHEWABLE ORAL
Status: DISCONTINUED | OUTPATIENT
Start: 2022-04-13 | End: 2022-04-22 | Stop reason: HOSPADM

## 2022-04-13 RX ORDER — GABAPENTIN 100 MG/1
200 CAPSULE ORAL 3 TIMES DAILY
Status: DISCONTINUED | OUTPATIENT
Start: 2022-04-13 | End: 2022-04-22 | Stop reason: HOSPADM

## 2022-04-13 RX ORDER — HYDRALAZINE HYDROCHLORIDE 10 MG/1
10 TABLET, FILM COATED ORAL 2 TIMES DAILY
Status: DISCONTINUED | OUTPATIENT
Start: 2022-04-13 | End: 2022-04-22 | Stop reason: HOSPADM

## 2022-04-13 RX ORDER — CHOLECALCIFEROL (VITAMIN D3) 10 MCG
1 TABLET ORAL NIGHTLY
Status: DISCONTINUED | OUTPATIENT
Start: 2022-04-13 | End: 2022-04-22 | Stop reason: HOSPADM

## 2022-04-13 RX ORDER — DEXTROSE MONOHYDRATE 50 MG/ML
100 INJECTION, SOLUTION INTRAVENOUS PRN
Status: DISCONTINUED | OUTPATIENT
Start: 2022-04-13 | End: 2022-04-22 | Stop reason: HOSPADM

## 2022-04-13 RX ADMIN — GABAPENTIN 200 MG: 100 CAPSULE ORAL at 12:45

## 2022-04-13 RX ADMIN — INSULIN GLARGINE-YFGN 5 UNITS: 100 INJECTION, SOLUTION SUBCUTANEOUS at 20:43

## 2022-04-13 RX ADMIN — HYDRALAZINE HYDROCHLORIDE 10 MG: 10 TABLET, FILM COATED ORAL at 21:16

## 2022-04-13 RX ADMIN — NEPHROCAP 1 MG: 1 CAP ORAL at 21:16

## 2022-04-13 RX ADMIN — OXYCODONE AND ACETAMINOPHEN 1 TABLET: 5; 325 TABLET ORAL at 17:03

## 2022-04-13 RX ADMIN — ATORVASTATIN CALCIUM 80 MG: 40 TABLET, FILM COATED ORAL at 20:43

## 2022-04-13 RX ADMIN — GABAPENTIN 200 MG: 100 CAPSULE ORAL at 20:43

## 2022-04-13 RX ADMIN — OXYCODONE AND ACETAMINOPHEN 1 TABLET: 5; 325 TABLET ORAL at 23:50

## 2022-04-13 ASSESSMENT — PAIN DESCRIPTION - LOCATION
LOCATION: BACK;LEG
LOCATION: BACK
LOCATION: BACK

## 2022-04-13 ASSESSMENT — ENCOUNTER SYMPTOMS
SHORTNESS OF BREATH: 0
CHEST TIGHTNESS: 0
NAUSEA: 0
SORE THROAT: 0
BACK PAIN: 1
VOMITING: 0
ABDOMINAL PAIN: 0
COUGH: 0
CHOKING: 0
SINUS PAIN: 0
DIARRHEA: 0
WHEEZING: 0
EYE PAIN: 0

## 2022-04-13 ASSESSMENT — PAIN DESCRIPTION - ORIENTATION
ORIENTATION: LOWER
ORIENTATION: RIGHT;LEFT
ORIENTATION: LOWER;MID

## 2022-04-13 ASSESSMENT — PAIN DESCRIPTION - ONSET: ONSET: ON-GOING

## 2022-04-13 ASSESSMENT — PAIN DESCRIPTION - PAIN TYPE
TYPE: ACUTE PAIN
TYPE: CHRONIC PAIN

## 2022-04-13 ASSESSMENT — PAIN DESCRIPTION - DESCRIPTORS
DESCRIPTORS: SHARP
DESCRIPTORS: ACHING;DISCOMFORT;SHARP;SHOOTING

## 2022-04-13 ASSESSMENT — PAIN SCALES - GENERAL
PAINLEVEL_OUTOF10: 0
PAINLEVEL_OUTOF10: 10
PAINLEVEL_OUTOF10: 10
PAINLEVEL_OUTOF10: 0
PAINLEVEL_OUTOF10: 8
PAINLEVEL_OUTOF10: 2

## 2022-04-13 ASSESSMENT — PAIN DESCRIPTION - FREQUENCY: FREQUENCY: INTERMITTENT

## 2022-04-13 NOTE — ED NOTES
Pt came in to ED due to chronic back pain worsening from disc issues. Pt stated she had a previous nerve block done which helped for around a months time. Pt stated pain was so bad she was unable to get out of bed to go to dialysis. Stable vital signs, respirations easy, connected to monitor, and call bell within reach. Pt is due for dialysis today 04/13/22. Pt also has history of mastectomy x10 years ago and lymphedema in R arm. Fistula for dialysis in left arm.       Leilani Delgado RN  04/13/22 1429       Leilani Delgado RN  04/13/22 [address]one[address] RN  04/13/22 [id number]

## 2022-04-13 NOTE — CARE COORDINATION
Social Work /Transition of Care:    Pt presents to the ED secondary to back pain. Pt is from home and recently discharged from this hospital to [address] for rehab on 3/17. Pt discharged from rehab on 4/3. SW met with pt an sister in law, Ana Luisa. Pt lives alone in a one floor condo. Pt reports Zayra and a friend assist her at home as needed. Pt has a walker and transport chair at home. Pt reports she has a broken bsc and would like a new one. Pt reports she is also in need of a wheel chair. SW provided freedom of choice and pt reports she would like any DME orders to go through [facility]. AINSLEY made initial referral to Malachi Damian with [facility]. Pt will need DME orders upon discharge. Pt plans to return home and resume Arpan Varela with Yessica Cruz from [facility] is aware pt has been admitted and will follow. AINSLEY/JOELLE to follow.

## 2022-04-13 NOTE — CONSULTS
[address] [place] [address] [facility]    Consultation Note     Admit Date: 4/13/2022  6:47 AM    Reason for Consult:   Spinal osteomyelitis    Attending Physician:  Allen Yung DO     Chief Complaint: Back pain    HISTORY OF PRESENT ILLNESS:   The patient is a 78 y.o.  female not known to the Infectious Diseases service. The patient has history of breast cancer almost 20 years ago, has chronic back pain underwent lumbar fusion L4-5 and L5-S1 and subsequently had implantation of the spinal cord stimulator. She had persistent back pain so she underwent epidural nerve block last month. I cannot find exact date when she had a. She came back with worsening back pain and feeling of numbness in the right lower extremity. No urinary or bowel incontinence. She does not have any fever/cough or shortness of breath has pain in the lower back radiating to the front of the abdomen. No urinary complaints. Since admission, she is afebrile hemodynamically stable. Normal white count, BNP of 4 than 70,000. She is on hemodialysis Monday Wednesday Friday via AV fistula in the left upper extremity. Troponin is 213. CRP is 7.7, ESR is 81. CT chest showed  Impression   New loss of height of L1 inferior endplate with increased soft tissue   inflammation.  Finding is suspicious for possible osteomyelitis.  Further   evaluation with lumbar spine MRI may be considered. Patient is not on any antibiotics and I got consulted for further recommendations. Past Medical History:        Diagnosis Date    Acute infection of bone ([address]     infection of rt foot, resolved.     Acute osteomyelitis of phalanx of left hand ([address] 1/27/2022    Left third distal phalanx    Anemia of chronic disease     Arthritis     Breast cancer ([address]     right breast, 2008/ bladder, 2006- last chemotherapy \"years ago\"    CAD (coronary artery disease)     Carpal tunnel syndrome     bilat - for OR left hand 3-17-20     Chronic diastolic CHF (congestive heart failure) ([address] 09/23/2014 9/23/14- echocardiogram revealed moderate LV concentric hypertrophy, stage III diastolic dysfunction, mild tricuspid regurgitation    CKD (chronic kidney disease) stage 4, GFR 15-29 ml/min ([facility])     Diabetic retinopathy ([address]     Glaucoma     Hemodialysis patient ([address]     [facility]- Dr. García Limbo - left arm fistula     Hyperkalemia, diminished renal excretion 11/9/2017    Hyperlipidemia     Hypertension     Hypoglycemia unawareness in type 1 diabetes mellitus ([address] 11/7/2017    Insulin dependent type 2 diabetes mellitus ([address]     Neuropathy     feet    Osteomyelitis due to secondary diabetes ([address]     rt great toe with amputation    Patient is Muslim 11/7/2017    Refusal of blood product     patient states she dose not take blood transfusion    Ventricular hypertrophy     Vitreous hemorrhage ([address]     left eye     Past Surgical History:        Procedure Laterality Date    AMPUTATION      right great toe    ANKLE SURGERY      correction on charcot joint of right ankle    CARDIAC CATHETERIZATION  12/09/2021    Dr Rodriguez Spotted Left 3/17/2020    LEFT CARPAL TUNNEL RELEASE performed by Nona Coelho MD at [facility]      bilateral    CHOLECYSTECTOMY      COLONOSCOPY      CYSTOSCOPY      DIALYSIS FISTULA CREATION Left 01/31/2018    upper arm/Dr. Heriberto Lino    ECHO COMPL W DOP COLOR FLOW  2/14/2013         ECHO COMPLETE  9/17/2013         FINGER AMPUTATION Left 1/20/2022    AMPUTATION OF LEFT THIRD DIGIT, DISTAL PHALANX performed by Nicholas Soto MD at [address] [facility] [address]      right    OTHER SURGICAL HISTORY  9/27/2011    PPV, membranectomy, laser Right eye    OTHER SURGICAL HISTORY  insertion lumbar drain insertion    10/12/`14    OTHER SURGICAL HISTORY  10/22/15    percutaneous lead placement for spinal cord stimulator    OTHER SURGICAL HISTORY  11/03/2015 Spinal; cord stimulator- turned off as of 3-10-20     IA AV ANAST,UP ARM BASILIC VEIN TRANSPOSIT Left 5/15/2018    TRANSPOSITION STAGE II AV FISTULA - LEFT UPPER ARM performed by Zaheer Godoy MD at [facility] ANGIOACCESS AV FISTULA Left 9/25/2018    SUPERFICIALIZATION AV FISTULA - LEFT ARM performed by Zaheer Godoy MD at [facility] W/VITRECTOMY ANY METH Left 4/10/2018    PARS PLANA VITRECTOMY 25 GAUGE RETINAL DETACHMENT REPAIR air fluid exchange, endolaser performed by Carlos A Miller MD at [address]      L2    TRANSESOPHAGEAL ECHOCARDIOGRAM  11/11/2021    Dr. Eduardo lFores TUNNELED [address]  11/15/2017    VITRECTOMY Left 04/10/2018    PARS PLANA VITRECTOMY; RETINAL DETACHMENT REPAIR; GAS BUBBLE; LASER LEFT EYE     Current Medications:   Scheduled Meds:   atorvastatin  80 mg Oral Nightly    [START ON 4/14/2022] allopurinol  100 mg Oral Daily    b complex-C-folic acid  1 mg Oral Nightly    [START ON 4/14/2022] bumetanide  2 mg Oral Once per day on Sun Tue Thu    gabapentin  200 mg Oral TID    hydrALAZINE  10 mg Oral BID    insulin glargine-yfgn  5 Units SubCUTAneous Nightly    [START ON 4/14/2022] isosorbide mononitrate  30 mg Oral Daily    latanoprost  1 drop Right Eye Nightly    [START ON 4/14/2022] pantoprazole  40 mg Oral QAM AC    [START ON 4/14/2022] metoprolol succinate  25 mg Oral Daily    [Held by provider] ticagrelor  90 mg Oral BID    lanthanum  1,000 mg Oral TID WC    brimonidine  1 drop Right Eye BID    And    timolol  1 drop Right Eye BID     Continuous Infusions:   dextrose       PRN Meds:lidocaine-prilocaine, midodrine, acetaminophen, melatonin, polyethylene glycol, glucose, dextrose, glucagon (rDNA), dextrose, ondansetron    Allergies:  Furosemide    Social History:   Social History     Socioeconomic History    Marital status: Single     Spouse name: None    Number of children: None    Years of education: None    Highest education level: None   Occupational History    None   Tobacco Use    Smoking status: Never Smoker    Smokeless tobacco: Never Used   Vaping Use    Vaping Use: Never used   Substance and Sexual Activity    Alcohol use: No    Drug use: No    Sexual activity: Not Currently   Other Topics Concern    None   Social History Narrative    None     Social Determinants of Health     Financial Resource Strain:     Difficulty of Paying Living Expenses: Not on file   Food Insecurity:     Worried About Running Out of Food in the Last Year: Not on file    Fior of Food in the Last Year: Not on file   Transportation Needs:     Lack of Transportation (Medical): Not on file    Lack of Transportation (Non-Medical): Not on file   Physical Activity:     Days of Exercise per Week: Not on file    Minutes of Exercise per Session: Not on file   Stress:     Feeling of Stress : Not on file   Social Connections:     Frequency of Communication with Friends and Family: Not on file    Frequency of Social Gatherings with Friends and Family: Not on file    Attends Sabianist Services: Not on file    Active Member of [address] [place] [address] or Organizations: Not on file    Attends Club or Organization Meetings: Not on file    Marital Status: Not on file   Intimate Partner Violence:     Fear of Current or Ex-Partner: Not on file    Emotionally Abused: Not on file    Physically Abused: Not on file    Sexually Abused: Not on file   Housing Stability:     Unable to Pay for Housing in the Last Year: Not on file    Number of [place] in the Last Year: Not on file    Unstable Housing in the Last Year: Not on file     Tobacco: No  Alcohol: No  Pets: No  Travel: No    Family History:       Problem Relation Age of Onset    Breast Cancer Mother 61    Hypertension Mother     Heart Disease Father     Prostate Cancer Father     Breast Cancer Maternal Grandmother 61   .  Otherwise non-pertinent to the chief complaint. REVIEW OF SYSTEMS:    As mentioned in HPI, all other systems negative. PHYSICAL EXAM:    Vitals:    BP (!) 119/59   Pulse 63   Temp 98 °F (36.7 °C)   Resp 18   Ht 5' 10\" (1.778 m)   Wt 172 lb (78 kg)   SpO2 94%   BMI 24.68 kg/m²   Constitutional: The patient is awake, alert, and oriented. Skin: Warm and dry. No jaundice. HEENT: Eyes show round, and reactive pupils. Neck: Supple to movements. No lymphadenopathy. Chest: Clear to auscultation posteriorly  Cardiovascular: S1 and S2 are rhythmic and regular. No murmurs appreciated. Abdomen: Soft nontender  Extremities: No clubbing, no cyanosis, no edema. Musculoskeletal: Has good strength in bilateral lower extremities and upper extremities. Left upper extremity AV fistula site looks good. Has multiple prior scars lumbar spine no redness or swelling. Has tenderness in the lower back.   Neurological: Alert and oriented and is about  Lines: peripheral      CBC+dif:  Recent Labs     04/13/22  0717   WBC 7.9   HGB 9.5*   HCT 32.6*   MCV 92.9      NEUTROABS 5.87     Lab Results   Component Value Date    CRP 0.7 (H) 01/20/2022    CRP 1.6 (H) 12/20/2021    CRP 2.3 (H) 11/15/2017     Lab Results   Component Value Date    CRPHS 0.7 08/16/2016    CRPHS 2.6 04/05/2016    CRPHS 7.6 (H) 01/28/2015     Lab Results   Component Value Date    SEDRATE 19 01/20/2022    SEDRATE 30 (H) 12/20/2021    SEDRATE 32 (H) 05/03/2012     Lab Results   Component Value Date    ALT 22 04/13/2022    AST 22 04/13/2022    ALKPHOS 109 (H) 04/13/2022    BILITOT 0.3 04/13/2022     Lab Results   Component Value Date     04/13/2022    K 4.6 04/13/2022     04/13/2022    CO2 29 04/13/2022    BUN 32 04/13/2022    CREATININE 5.2 04/13/2022    GFRAA 10 04/13/2022    LABGLOM 10 04/13/2022    GLUCOSE 72 04/13/2022    GLUCOSE 46 04/02/2012    PROT 6.5 04/13/2022    LABALBU 3.1 04/13/2022    LABALBU 3.8 04/02/2012    CALCIUM 8.8 04/13/2022    BILITOT 0.3 04/13/2022    ALKPHOS 109 04/13/2022    AST 22 04/13/2022    ALT 22 04/13/2022       Lab Results   Component Value Date    PROTIME 11.1 01/20/2022    PROTIME 10.4 02/15/2012    INR 1.0 01/20/2022       Lab Results   Component Value Date    TSH 2.810 05/19/2021       Lab Results   Component Value Date    COLORU Yellow 11/07/2017    PHUR 5.5 11/07/2017    LABCAST RARE 02/17/2017    WBCUA 1-3 11/07/2017    WBCUA NONE 08/27/2011    RBCUA 0-1 11/07/2017    RBCUA 1-3 02/13/2013    BACTERIA MODERATE 11/07/2017    CLARITYU Clear 11/07/2017    SPECGRAV 1.025 11/07/2017    LEUKOCYTESUR Negative 11/07/2017    UROBILINOGEN 0.2 11/07/2017    BILIRUBINUR Negative 11/07/2017    BILIRUBINUR NEGATIVE 08/27/2011    BLOODU Negative 11/07/2017    GLUCOSEU Negative 11/07/2017    GLUCOSEU NEGATIVE 08/27/2011    AMORPHOUS MODERATE 11/07/2017       No results found for: IYK2ARY, BEART, N7ZMJPJJ, PHART, THGBART, NFO7ZON, PO2ART, YZM0PRZ  Radiology:  CT LUMBAR SPINE WO CONTRAST   Final Result   New loss of height of L1 inferior endplate with increased soft tissue   inflammation. Finding is suspicious for possible osteomyelitis. Further   evaluation with lumbar spine MRI may be considered. Microbiology:  Pending  No results for input(s): BC in the last 72 hours. No results for input(s): ORG in the last 72 hours. No results for input(s): Antonio Holes in the last 72 hours. No results for input(s): STREPNEUMAGU in the last 72 hours. No results for input(s): LP1UAG in the last 72 hours. No results for input(s): ASO in the last 72 hours. No results for input(s): CULTRESP in the last 72 hours. Assessment:  Lumbar spine osteomyelitis: History of prior back surgery and recent epidural nerve block. ESRD on HD:    Plan:    Discussed with Dr. Marcus Khan. Planning on bone survey to rule out any metastatic disease from prior breast cancer. If it is negative planning on bone biopsy tomorrow.   Order blood cultures  Hold off antibiotics until we get the bone biopsy. Will follow with you    Thank you for having us see this patient in consultation. I will be discussing this case with the treating physicians.        Electronically signed by Devonte Alex MD on 4/13/2022 at 11:42 AM

## 2022-04-13 NOTE — LETTER
[address] Medicaid  CERTIFICATION OF NECESSITY  FOR TRANSPORTATION   BY WHEELCHAIR VAN     Individual Information   1. Name: Khushbu Franco 2. [place] Medicaid Billing Number:    3. Address: [address] [place] [address], [address] [facility]      Transportation Provider Information   4. Provider Name:    5. [place] Medicaid Provider Number:  National Provider Identifier (NPI):      Certification  7. Criteria:  By signing this document, the practitioner certifies that two statements are true:  A. This individual must be accompanied by a mobility-related assistive device from the point of pick-up to the point of drop-off. B. Transport of this individual by standard passenger vehicle or common carrier is precluded or contraindicated. 8. Period Beginning Date: 04/19/22   9. Length  [x] Not more than 10 day(s)  [] One Year     Additional Information Relevant to Certification   10. Comments or Explanations, If Necessary or Appropriate     Back pain, weakness, spinal stenosis     Certifying Practitioner Information   11. Name of Practitioner: Porsha Das DO   12. [place] Medicaid Provider Number, If Applicable:  [address] Provider Identifier (NPI):      Signature Information   14. Date of Signature: 04/19/22 15. Name of Person Signing: [place], [place]   36. Signature and Professional Designation: Electronically signed by [facility] YRN AVALOS on 4/19/2022 at 3:19 PM  Discharge planner     [facility] 070 [id number] 8517. 7/2015    [address] [facility] Encounter Date/Time: 4/13/2022 2668    Hospital Account: [de-identified]    MRN: [id number]    Patient: Khushbu Franco    Contact Serial #: [id number]      ENCOUNTER          Patient Class: I Private Enc? No Unit RM BD: SEYZ 4S PICU 4506/4506-B   Hospital Service: MIRIAM   Encounter DX: Unable to ambulate [R26. *   ADM Provider: Ophelia Zhao DO   Procedure:     ATT Provider: Ophelia Zhao DO   REF Provider:        Admission DX: Unable to ambulate, Intractable low back pain, Ambulatory dysfunction and DX codes: R26.2, M54.59, R26.2      PATIENT                 Name: Alissa Barrios : 1955 (67 yrs)   Address: [address] [facility] [address] [facility] [address], [address] Sex: Female   City: [facility] [place] [facility] [place] [address]         Marital Status: Single   Employer: DISABLED         [employer]: [employer] Witness   Primary Care Provider: Alhaji Waddell MD         Primary Phone: [phone]   EMERGENCY CONTACT   Contact Name Legal Guardian? Relationship to Patient Home Phone Work Phone   1. Pita Gaines  2. Riccardo Ugarte      Other  Brother/Sister [phone]                 GUARANTOR            Guarantor: Alissa Barrios     : 1955   Address:  [address] Sex: Female     [place] [address]     Relation to Patient: Self       Home Phone: [phone]   Guarantor ID: [id number]       Work Phone:     Guarantor Employer: DISABLED         Status: DISABLED      COVERAGE        PRIMARY INSURANCE   Payor: [address] [place] [address] 62*   Payor Address: [address], [address] [place] [address],[address] [facility]       Group Number: [id number] Type: INDEMNITY   Subscriber Name: Kiki Tian : 1955   Subscriber ID: [id number] Pat. Rel. to Sub: Self   SECONDARY INSURANCE   Payor:   Plan:     Payor Address:  ,           Group Number:   Insurance Type:     Subscriber Name:   Subscriber :     Subscriber ID:   Pat.  Rel. to Sub:             CSN: [id number]

## 2022-04-13 NOTE — ED PROVIDER NOTES
ED  Provider Note  Admit Date/RoomTime: 4/13/2022  6:47 AM  ED Room: 19/19     HPI:   Lou Larsen is a 78 y.o. female presenting to the ED for back pain, beginning days ago. History comes primarily from the patient and medical record.          Patient Active Problem List:     Diabetic retinopathy ([address]     Malignant neoplasm of right female breast ([address]     Atherosclerosis of native coronary artery of native heart without angina pectoris     Moderate obesity     Left ventricular hypertrophy     Herniated lumbar intervertebral disc     Lumbar degenerative disc disease     Pseudomeningocele     Lumbar radiculopathy     Lymphedema of arm     CKD (chronic kidney disease) stage 4, GFR 15-29 ml/min ([facility])     Insulin dependent type 2 diabetes mellitus (HCC)     Anemia of chronic disease     Chronic diastolic CHF (congestive heart failure) ([facility])     Neuropathy     Hypertension     Glaucoma     Refusal of blood product     Pancytopenia ([address]     Controlled type 2 diabetes mellitus with chronic kidney disease on chronic dialysis, with long-term current use of insulin ([address]     Vitreous hemorrhage ([address]     Patient is Sabianism     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 (HCC)     Hyperkalemia, diminished renal excretion     Chronic pain syndrome     Lumbar post-laminectomy syndrome     Myalgia     Cervicalgia     Diabetic peripheral neuropathy (HCC)     ESRD (end stage renal disease) (HCC)     Bilateral carpal tunnel syndrome     Spinal stenosis of lumbar region with neurogenic claudication     Cardiac arrest ([address]     ESRD (end stage renal disease) on dialysis ([address]     Mixed hyperlipidemia     Lymphedema of right upper extremity     Coronary artery disease involving native coronary artery of native heart with angina pectoris (HCC)     Ventricular tachycardia (HCC)     Mitral valve disease     CAD in native artery     Lumbar stenosis without neurogenic claudication     Lumbar foraminal stenosis     Back pain     Intractable low back pain  . The complaint has been persistent, moderate in severity, improved by nothing and worsened by nothing. Associated symptoms include none. Patel Ramirez states that she has severe back problems (see copy of CT results below) that are impairing her ability to ambulate. Unfortunately the patient also has significant heart disease including valvular disease that anesthesiology states are a barrier to her doing surgery on her back. Unfortunately, until she is able to ambulate effectively for post surgical rehab, the patient cannot have corrective surgery on her heart either. Due to this difficult position, the patient was treated about a month ago with a nerve block of her back which was effective for about a month. During that time she was able to do rehab therapies, become stronger and more ambulatory, and prepped for her heart surgery. Unfortunately, her nerve block has now worn off and she is no longer able to ambulate secondary to back pain once again. Patient was unable to go to her dialysis today as result of this back pain and will require dialysis as well. She denies bowel or bladder incontinence, saddle anesthesia or other findings concerning for cauda equina syndrome. Ct results from 3/22:    3. Severe loss of disc height with grade 2 anterolisthesis of L2 over L3 and  chronic loss of vertebral body height at L2.  4. Severe central canal stenosis at L2-3.  Moderate stenosis at L1-2.  5.  Multilevel neural foraminal stenoses, worst (severe) at L2-3.  Moderate  to severe stenoses at L1-2. Review of Systems   Constitutional: Positive for activity change. Negative for appetite change, chills and fever. HENT: Negative for sinus pain and sore throat. Eyes: Negative for pain and visual disturbance. Respiratory: Negative for cough, choking, chest tightness, shortness of breath and wheezing.     Cardiovascular: Negative for chest pain and palpitations. Gastrointestinal: Negative for abdominal pain, diarrhea, nausea and vomiting. Genitourinary: Negative for hematuria. Musculoskeletal: Positive for arthralgias, back pain and gait problem. Negative for neck pain and neck stiffness. Skin: Negative for rash. Neurological: Negative for tremors, syncope and weakness. Psychiatric/Behavioral: Negative for confusion. Physical Exam  Vitals and nursing note reviewed. Constitutional:       Appearance: She is well-developed. She is not ill-appearing. HENT:      Head: Normocephalic and atraumatic. Eyes:      Pupils: Pupils are equal, round, and reactive to light. Cardiovascular:      Rate and Rhythm: Normal rate and regular rhythm. Pulses: Normal pulses. Heart sounds: Normal heart sounds. Pulmonary:      Effort: Pulmonary effort is normal. No respiratory distress. Breath sounds: Normal breath sounds. No wheezing or rales. Abdominal:      General: Bowel sounds are normal.      Palpations: Abdomen is soft. Tenderness: There is no abdominal tenderness. There is no guarding or rebound. Musculoskeletal:      Cervical back: Normal range of motion and neck supple. Skin:     General: Skin is warm and dry. Capillary Refill: Capillary refill takes less than 2 seconds. Neurological:      General: No focal deficit present. Mental Status: She is alert and oriented to person, place, and time. Cranial Nerves: No cranial nerve deficit.       Coordination: Coordination normal.     EKG interpretation  Rate 57  Sinus bradycardia  Normal axis  No KS, QRS, QT prolongation  No ST segment elevations or depressions  T wave inversions noted in leads III, aVF, V6, unchanged as compared to previous abnormal EKG    Procedures     MDM  Number of Diagnoses or Management Options  Diagnosis management comments: Emergency department evaluation was notable for persistent back pain as well as new findings of loss of height of L1 inferior endplate with increase soft tissue inflammation on repeat CT scan. This finding was discussed with the neurosurgeon who recommended patient evaluation and management. This information was relayed to the patient who understood this plan of care and was amenable to the plan. Patient was discussed with the admitting service (Dr. Orin Vazquez) who concurred with the decision for admission, and have agreed to admit the patient to med/surg       ED Course as of 04/13/22 1319 Wed Apr 13, 2022 1015 Discussed patient with Dr. Frankie Schmitz of neurosurgery, who advised that the patient be admitted to the hospital for further evaluation in the inpatient setting [RK]      ED Course User Index  [RK] [address] DO     ED Course as of 04/13/22 1319 Wed Apr 13, 2022 1015 Discussed patient with Dr. Frankie Schmitz of neurosurgery, who advised that the patient be admitted to the hospital for further evaluation in the inpatient setting [RK]      ED Course User Index  [RK] [address] DO       --------------------------------------------- PAST HISTORY ---------------------------------------------  Past Medical History:  has a past medical history of Acute infection of bone ([facility] Acute osteomyelitis of phalanx of left hand ([facility] Anemia of chronic disease, Arthritis, Breast cancer ([facility] CAD (coronary artery disease), Carpal tunnel syndrome, Chronic diastolic CHF (congestive heart failure) ([facility] CKD (chronic kidney disease) stage 4, GFR 15-29 ml/min ([facility] Diabetic retinopathy ([facility] Glaucoma, Hemodialysis patient ([facility] Hyperkalemia, diminished renal excretion, Hyperlipidemia, Hypertension, Hypoglycemia unawareness in type 1 diabetes mellitus ([facility] Insulin dependent type 2 diabetes mellitus ([facility] Neuropathy, Osteomyelitis due to secondary diabetes [facility]), Patient is Jainism, Refusal of blood product, Ventricular hypertrophy, and Vitreous hemorrhage ([facility]     Past Surgical History:  has a past surgical history that includes Cholecystectomy; amputation; Mastectomy; Cystoscopy; Cataract removal; Ankle surgery; other surgical history (9/27/2011); ECHO Compl W Dop Color Flow (2/14/2013); Echo Complete (9/17/2013); spinal cord decompression; other surgical history (insertion lumbar drain insertion); other surgical history (10/22/15); other surgical history (11/03/2015); Tunneled venous catheter placement (11/15/2017); Dialysis fistula creation (Left, 01/31/2018); vitrectomy (Left, 04/10/2018); pr rpr retinal dtchmnt w/vitrectomy any meth (Left, 4/10/2018); pr av anast,up arm basilic vein transposit (Left, 5/15/2018); pr ligatn angioaccess av fistula (Left, 9/25/2018); Colonoscopy; Carpal tunnel release (Left, 3/17/2020); transesophageal echocardiogram (11/11/2021); Cardiac catheterization (12/09/2021); and Finger amputation (Left, 1/20/2022). Social History:  reports that she has never smoked. She has never used smokeless tobacco. She reports that she does not drink alcohol and does not use drugs. Family History: family history includes Breast Cancer (age of onset: 61) in her maternal grandmother and mother; Heart Disease in her father; Hypertension in her mother; Prostate Cancer in her father. The patients home medications have been reviewed.     Allergies: Furosemide    -------------------------------------------------- RESULTS -------------------------------------------------    LABS:  Results for orders placed or performed during the hospital encounter of 04/13/22   CBC with Auto Differential   Result Value Ref Range    WBC 7.9 4.5 - 11.5 E9/L    RBC 3.51 3.50 - 5.50 E12/L    Hemoglobin 9.5 (L) 11.5 - 15.5 g/dL    Hematocrit 32.6 (L) 34.0 - 48.0 %    MCV 92.9 80.0 - 99.9 fL    MCH 27.1 26.0 - 35.0 pg    MCHC 29.1 (L) 32.0 - 34.5 %    RDW 17.2 (H) 11.5 - 15.0 fL    Platelets 986 901 - 189 E9/L    MPV 10.1 7.0 - 12.0 fL    Neutrophils % 74.2 43.0 - 80.0 %    Immature Granulocytes % 1.1 0.0 - 5.0 % Lymphocytes % 14.2 (L) 20.0 - 42.0 %    Monocytes % 8.7 2.0 - 12.0 %    Eosinophils % 1.4 0.0 - 6.0 %    Basophils % 0.4 0.0 - 2.0 %    Neutrophils Absolute 5.87 1.80 - 7.30 E9/L    Immature Granulocytes # 0.09 E9/L    Lymphocytes Absolute 1.12 (L) 1.50 - 4.00 E9/L    Monocytes Absolute 0.69 0.10 - 0.95 E9/L    Eosinophils Absolute 0.11 0.05 - 0.50 E9/L    Basophils Absolute 0.03 0.00 - 0.20 E9/L   Comprehensive Metabolic Panel w/ Reflex to MG   Result Value Ref Range    Sodium 140 132 - 146 mmol/L    Potassium reflex Magnesium 4.6 3.5 - 5.0 mmol/L    Chloride 100 98 - 107 mmol/L    CO2 29 22 - 29 mmol/L    Anion Gap 11 7 - 16 mmol/L    Glucose 72 (L) 74 - 99 mg/dL    BUN 32 (H) 6 - 23 mg/dL    CREATININE 5.2 (H) 0.5 - 1.0 mg/dL    GFR Non-African American 10 >=60 mL/min/1.73    GFR African American 10     Calcium 8.8 8.6 - 10.2 mg/dL    Total Protein 6.5 6.4 - 8.3 g/dL    Albumin 3.1 (L) 3.5 - 5.2 g/dL    Total Bilirubin 0.3 0.0 - 1.2 mg/dL    Alkaline Phosphatase 109 (H) 35 - 104 U/L    ALT 22 0 - 32 U/L    AST 22 0 - 31 U/L   Troponin   Result Value Ref Range    Troponin, High Sensitivity 219 (H) 0 - 9 ng/L   Brain Natriuretic Peptide   Result Value Ref Range    Pro-BNP >70,000 (H) 0 - 125 pg/mL   Troponin   Result Value Ref Range    Troponin, High Sensitivity 213 (H) 0 - 9 ng/L   C-Reactive Protein   Result Value Ref Range    CRP 7.7 (H) 0.0 - 0.4 mg/dL   Procalcitonin   Result Value Ref Range    Procalcitonin 0.67 (H) 0.00 - 0.08 ng/mL   EKG 12 Lead   Result Value Ref Range    Ventricular Rate 57 BPM    Atrial Rate 57 BPM    P-R Interval 168 ms    QRS Duration 98 ms    Q-T Interval 476 ms    QTc Calculation (Bazett) 463 ms    P Axis 38 degrees    R Axis 75 degrees    T Axis -11 degrees       RADIOLOGY:  CT LUMBAR SPINE WO CONTRAST   Final Result   New loss of height of L1 inferior endplate with increased soft tissue   inflammation. Finding is suspicious for possible osteomyelitis.   Further   evaluation with lumbar spine MRI may be considered. ------------------------- NURSING NOTES AND VITALS REVIEWED ---------------------------  Date / Time Roomed:  4/13/2022  6:47 AM  ED Bed Assignment:  19/19    The nursing notes within the ED encounter and vital signs as below have been reviewed. Patient Vitals for the past 24 hrs:   BP Temp Temp src Pulse Resp SpO2 Height Weight   04/13/22 1206 128/70 98.2 °F (36.8 °C) Oral 62 14 95 % -- --   04/13/22 0700 (!) 119/59 -- -- -- -- -- -- --   04/13/22 0648 (!) 129/45 98 °F (36.7 °C) -- 63 18 94 % 5' 10\" (1.778 m) 172 lb (78 kg)       Oxygen Saturation Interpretation: Normal    ------------------------------------------ PROGRESS NOTES ------------------------------------------  Re-evaluation(s):  Time: 1:18 PM EDT  Patients symptoms show no change  Repeat physical examination is not changed    Counseling:  I have spoken with the patient and discussed todays results, in addition to providing specific details for the plan of care and counseling regarding the diagnosis and prognosis. Their questions are answered at this time and they are agreeable with the plan of admission.    --------------------------------- ADDITIONAL PROVIDER NOTES ---------------------------------  Consultations:  Time: 1:18 PM EDT. Spoke with Dr. Mayito Quiles. Discussed case. They will admit the patient. This patient's ED course included: a personal history and physicial examination, re-evaluation prior to disposition, multiple bedside re-evaluations and IV medications    This patient has remained hemodynamically stable during their ED course. Diagnosis:  1. Intractable low back pain    2. Ambulatory dysfunction        Disposition:  Patient's disposition: Admit to telemetry  Patient's condition is fair.           Lay[address] DO  Resident  04/13/22 6512

## 2022-04-13 NOTE — CONSULTS
[facility]  Nephrology Consult Note    Patient's Name: Rose Song     Reason for Consult:  dialysis      Chief Complaint:   Back pain  History Obtained From:  patient, past medical records and EMR     History of Present Illness:    Rose Song is a 78 y.o. female with a past medical history of neuropathy, hypertension, hyperlipidemia, breast cancer, and coronary artery disease. She presented to the ED on 4/13, per the ED provider note with back pain. Vital signs at presentation to the ED include temperature 98, respirations 18, pulse 63, /45, and she was 94% on room air. Lab data on presentation to the ED includes BUN 32, creatinine 5.2, proBNP > 70,000, and hemoglobin 9.5. Patient had a recent hospital stay from 3/9-3/18 for low back pain, during that time she had received epidural nerve block. We were consulted to see the patient for dialysis. Patient is known to our service and dialyzes at [address] MWF first shift via a left AV fistula. At present, patient was seen and examined on HD. She explained that she came home from rehab on Monday. She reported that she had too much pain this morning and decided to come to the hospital.  She did report a little bit of nausea this morning. She also explained that she has been experiencing some fatigue. She denies any chest pain or shortness of breath. She denies any abdominal pain or vomiting. Denies any headaches or dizziness. She denies any weakness. PMH:    Past Medical History:   Diagnosis Date    Acute infection of bone ([address]     infection of rt foot, resolved.     Acute osteomyelitis of phalanx of left hand ([address] 1/27/2022    Left third distal phalanx    Anemia of chronic disease     Arthritis     Breast cancer ([facility]     right breast, 2008/ bladder, 2006- last chemotherapy \"years ago\"    CAD (coronary artery disease)     Carpal tunnel syndrome     bilat - for OR left hand 3-17-20     Chronic diastolic CHF (congestive heart failure) ([address] 09/23/2014 9/23/14- echocardiogram revealed moderate LV concentric hypertrophy, stage III diastolic dysfunction, mild tricuspid regurgitation    CKD (chronic kidney disease) stage 4, GFR 15-29 ml/min (HCC)     Diabetic retinopathy ([address]     Glaucoma     Hemodialysis patient ([address]     [facility]- Dr. Kathreine Ordonez - left arm fistula     Hyperkalemia, diminished renal excretion 11/9/2017    Hyperlipidemia     Hypertension     Hypoglycemia unawareness in type 1 diabetes mellitus ([address] 11/7/2017    Insulin dependent type 2 diabetes mellitus ([address]     Neuropathy     feet    Osteomyelitis due to secondary diabetes ([address]     rt great toe with amputation    Patient is Worship 11/7/2017    Refusal of blood product     patient states she dose not take blood transfusion    Ventricular hypertrophy     Vitreous hemorrhage (HCC)     left eye     Patient Active Problem List   Diagnosis    Diabetic retinopathy ([address]    Malignant neoplasm of right female breast ([address]    Atherosclerosis of native coronary artery of native heart without angina pectoris    Moderate obesity    Left ventricular hypertrophy    Herniated lumbar intervertebral disc    Lumbar degenerative disc disease    Pseudomeningocele    Lumbar radiculopathy    Lymphedema of arm    CKD (chronic kidney disease) stage 4, GFR 15-29 ml/min (HCC)    Insulin dependent type 2 diabetes mellitus (HCC)    Anemia of chronic disease    Chronic diastolic CHF (congestive heart failure) (HCC)    Neuropathy    Hypertension    Glaucoma    Refusal of blood product    Pancytopenia ([address]    Controlled type 2 diabetes mellitus with chronic kidney disease on chronic dialysis, with long-term current use of insulin (HCC)    Vitreous hemorrhage ([address]    Patient is Worship    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 ([address]    Hyperkalemia, diminished renal excretion    Chronic pain syndrome    Lumbar post-laminectomy syndrome    Myalgia    Cervicalgia    Diabetic peripheral neuropathy (HCC)    ESRD (end stage renal disease) (HCC)    Bilateral carpal tunnel syndrome    Spinal stenosis of lumbar region with neurogenic claudication    Cardiac arrest ([facility]    ESRD (end stage renal disease) on dialysis ([facility]    Mixed hyperlipidemia    Lymphedema of right upper extremity    Coronary artery disease involving native coronary artery of native heart with angina pectoris (HCC)    Ventricular tachycardia (HCC)    Mitral valve disease    CAD in native artery    Lumbar stenosis without neurogenic claudication    Lumbar foraminal stenosis    Back pain    Intractable low back pain    Unable to ambulate     Meds:     atorvastatin  80 mg Oral Nightly    [START ON 4/14/2022] allopurinol  100 mg Oral Daily    b complex-C-folic acid  1 mg Oral Nightly    [START ON 4/14/2022] bumetanide  2 mg Oral Once per day on Sun Tue Thu    gabapentin  200 mg Oral TID    hydrALAZINE  10 mg Oral BID    insulin glargine-yfgn  5 Units SubCUTAneous Nightly    [START ON 4/14/2022] isosorbide mononitrate  30 mg Oral Daily    latanoprost  1 drop Right Eye Nightly    [START ON 4/14/2022] pantoprazole  40 mg Oral QAM AC    [START ON 4/14/2022] metoprolol succinate  25 mg Oral Daily    [Held by provider] ticagrelor  90 mg Oral BID    lanthanum  1,000 mg Oral TID WC    brimonidine  1 drop Right Eye BID    And    timolol  1 drop Right Eye BID      dextrose       Meds prn:     lidocaine-prilocaine, midodrine, acetaminophen, melatonin, polyethylene glycol, glucose, dextrose, glucagon (rDNA), dextrose, ondansetron, oxyCODONE-acetaminophen    Meds prior to admission:     No current facility-administered medications on file prior to encounter.      Current Outpatient Medications on File Prior to Encounter   Medication Sig Dispense Refill    midodrine (PROAMATINE) 5 MG tablet Take 5 mg by mouth daily as needed  gabapentin (NEURONTIN) 100 MG capsule Take 2 capsules by mouth 3 times daily for 180 days. 180 capsule 5    atorvastatin (LIPITOR) 80 MG tablet Take 80 mg by mouth nightly      bumetanide (BUMEX) 2 MG tablet Take 2 mg by mouth three times a week Given Sunday,Tuesday,Thursday      isosorbide mononitrate (IMDUR) 30 MG extended release tablet Take 30 mg by mouth daily      insulin glargine (LANTUS) 100 UNIT/ML injection vial Inject 5 Units into the skin nightly       lidocaine-prilocaine (EMLA) 2.5-2.5 % cream Apply topically as needed for Pain       hydrALAZINE (APRESOLINE) 10 MG tablet Take 1 tablet by mouth 2 times daily 180 tablet 1    metoprolol succinate (TOPROL XL) 25 MG extended release tablet Take 1 tablet by mouth daily 90 tablet 1    lanthanum (FOSRENOL) 1000 MG chewable tablet Take 1,000 mg by mouth 3 times daily (with meals)       allopurinol (ZYLOPRIM) 100 MG tablet Take 1 tablet by mouth daily 90 tablet 1    ticagrelor (BRILINTA) 90 MG TABS tablet Take 1 tablet by mouth 2 times daily 180 tablet 1    B Complex-C-Folic Acid (BONI CAPS) 1 MG CAPS Take 1 mg by mouth nightly       omeprazole (PRILOSEC) 20 MG delayed release capsule Take 20 mg by mouth daily as needed       LUMIGAN 0.01 % SOLN ophthalmic drops Place 1 drop into the right eye nightly       COMBIGAN 0.2-0.5 % ophthalmic solution Place 1 drop into the right eye every 12 hours   7     Allergies:    Furosemide    Social History:     reports that she has never smoked. She has never used smokeless tobacco. She reports that she does not drink alcohol and does not use drugs. Family History:         Problem Relation Age of Onset   [place] Breast Cancer Mother 61    Hypertension Mother     Heart Disease Father     Prostate Cancer Father     Breast Cancer Maternal Grandmother 60     Review of Systems:   Pertinent items are noted in HPI.     Physical Exam:    Patient Vitals for the past 24 hrs:   BP Temp Temp src Pulse Resp SpO2 Height Weight   04/13/22 1332 (!) 100/30 -- -- 61 -- -- -- --   04/13/22 1315 (!) 115/21 -- -- 63 -- -- -- --   04/13/22 1310 (!) 113/50 98 °F (36.7 °C) -- 85 -- -- -- --   04/13/22 1206 128/70 98.2 °F (36.8 °C) Oral 62 14 95 % -- --   04/13/22 0700 (!) 119/59 -- -- -- -- -- -- --   04/13/22 0648 (!) 129/45 98 °F (36.7 °C) -- 63 18 94 % 5' 10\" (1.778 m) 172 lb (78 kg)     No intake or output data in the 24 hours ending 04/13/22 1354    General: Awake, alert, no acute distress  Neck: No JVD noted  Lungs: Clear bilaterally upper, diminished to the bases bilaterally. Unlabored  CV: Regular rate and rhythm. No rub  Abd: Soft, nontender, nondistended. Active bowel sounds  Skin: Warm and dry. No rash on exposed extremities  Ext: 1+ RUE edema (history of breast CA); left AV fistula  Neuro: Awake, answers questions appropriately    Data:    Recent Labs     04/13/22  0717   WBC 7.9   HGB 9.5*   HCT 32.6*   MCV 92.9        Recent Labs     04/13/22  0717      K 4.6      CO2 29   CREATININE 5.2*   BUN 32*   LABGLOM 10   GLUCOSE 72*   CALCIUM 8.8     Vit D, 25-Hydroxy   Date Value Ref Range Status   03/11/2022 28 (L) 30 - 100 ng/mL Final     Comment:     <20 ng/mL. ........... Abhi Ra Deficient  20-30 ng/mL. ......... Abhi Ra Insufficient   ng/mL. ........ Abhi Ra Sufficient  >100 ng/mL. .......... Abhi Ra Toxic       PTH   Date Value Ref Range Status   03/11/2022 241 (H) 15 - 65 pg/mL Final     Recent Labs     04/13/22  0717   ALT 22   AST 22   ALKPHOS 109*   BILITOT 0.3     Recent Labs     04/13/22  0717   LABALBU 3.1*     Ferritin   Date Value Ref Range Status   11/09/2017 334 ng/mL Final     Comment:     FERRITIN Reference Ranges:  Adult Males   20 - 60 yrars:    30 - 400 ng/mL  Adult females 16 - 60 years:    15 - 150 ng/mL  Adults greater than 60 years:   no established reference range  Pediatrics:                     no established reference range       Iron   Date Value Ref Range Status   11/09/2017 33 (L) 37 - 145 mcg/dL Final TIBC   Date Value Ref Range Status   11/09/2017 222 (L) 250 - 450 mcg/dL Final     Vitamin B-12   Date Value Ref Range Status   02/17/2017 1802 (H) 211 - 946 pg/mL Final     Folate   Date Value Ref Range Status   02/17/2017 >20.0 4.8 - 24.2 ng/mL Final     Lab Results   Component Value Date    COLORU Yellow 11/07/2017    NITRU Negative 11/07/2017    GLUCOSEU Negative 11/07/2017    GLUCOSEU NEGATIVE 08/27/2011    KETUA Negative 11/07/2017    UROBILINOGEN 0.2 11/07/2017    BILIRUBINUR Negative 11/07/2017    BILIRUBINUR NEGATIVE 08/27/2011     No results found for: ALVINO, CREURRAN, MACREATRATIO, OSMOU    No components found for: URIC    No results found for: LIPIDPAN    Assessment and Plan:    1. ESRD on HD  OP [facility] MWF first shift   via a left AV fistula  Continue HD MWF    2.  Back pain  S/p epidural nerve block during last hospital stay  Infectious disease following for possible osteo  Neurosurgery and ID following    3. Hypertension  BP goal<140/80  BP at goal  Follow on current regimen and with HD    4. Anemia  Hemoglobin target 10-12  Hemoglobin 9.5 today  Will start JAYSHREE tomorrow if hgb <10 tomorrow  Transfuse for hgb < 7  Follow     5. Secondary hyperparathyroidism of renal origin   on 3/11  Phos 5.8 on 3/11  Recheck Phos tomorrow  Follow    Elzbieta Shankar, APRN - CNP       Patient seen and examined all key components of the physical performed independently , case discussed with NP, all pertinent labs and radiologic tests personally reviewed agree with above. Olu Delgado MD      Department of Internal Medicine  Section of Nephrology  Dialysis Note        PROCEDURE:  Patient seen on hemodialysis    PHYSICIAN:  Usman Davis M.D., FACP    INDICATION:  End-stage renal disease    RX:  See dialysis flowsheet for specifics on access, blood flow rate, dialysate baths, duration of dialysis, anticoagulation and other technical information.     COMMENTS:  Procedure in progress and tolerated Zeynep Ludwig MD, MD

## 2022-04-13 NOTE — H&P
Inpatient H&P      PCP:  Donal Llanes MD  Admitting Physician:  Natasha Preez DO  Consultants: Nephrology, infectious disease, neurosurgery. Chief Complaint:    Chief Complaint   Patient presents with    Back Pain     chronic in nature pt has disc issues unable to get out of bed to go to dialysis toda       History of Present Illness  Jessenia Lehman is a 78 y.o. female who presents to [facility] ER complaining of back pain, trouble with ambulation. Jessenia Lehman has a past medical history that includes spinal stenosis, ESRD on dialysis, chronic anemia, hypertension, hyperlipidemia, diabetes mellitus, gout, coronary artery disease, mitral valve papillary fibroblastoma. Cha Zhao presented to the ER with back pain and issues with ambulation. She states she was unable to get out of bed to go to dialysis today. The patient has significant heart disease including valvular disease that anesthesiology has stated as a barrier to her doing surgery on her back. Unfortunately, until she is able to ambulate effectively for post surgical rehab, the patient cannot have corrective surgery on her heart either. Due to this difficult position, the patient was treated about a month ago with a nerve block of her back which was effective for about a month and she was able to do physical therapy. She was recently discharged from SNF on 4/3. However today pain caused patient to be unable to go to dialysis today. She denies any bowel or bladder incontinence. CT of the lumbar spine showed new loss of height in L1 inferior endplate with increased soft tissue inflammation. Findings suspicious for possible osteomyelitis. Patient has spinal cord stimulator so is unable to have MRI    ER Course  Upon presentation to the ER, routine labwork was performed which revealed BUN of 32, creatinine 5.2, proBNP greater than 70,000, troponin 219, hemoglobin 9.5.   Imaging results are as outlined below in the Imaging section of this note. EKG revealed sinus bradycardia with nonspecific T wave abnormality. Balta Snare Upon arrival to the ER, patient was 129/45. The patient received no meds in the emergency room and was admitted under the care of [facility] physicians. Last Hospital Admission - 3/9/22  1.  Back pain with spinal stenosis: Cervical and lumbar spine CT consistent with  Severe central canal stenosis at L2-3.  Moderate stenosis at L1-2.   5.  Multilevel neural foraminal stenoses, worst (severe) at L2-3.  Moderate   to severe stenoses at L1-2.    Pain control as indicated.  Neurosurgery consultation  Status post epidural nerve block on 3/14     2.  End-stage renal disease on hemodialysis:  3.  Chronic anemia secondary to end-stage renal disease  4.  Essential hypertension  5.  Hyperlipidemia  6.  Diabetes type 2 with end-stage renal disease  7.  Gout without acute attack  8. History of coronary artery disease post PCI  9. Also have mitral valve papillary fibroblastoma           Last Echocardiogram - 11/11/21   Normal left ventricle size and systolic function. Structurally normal anterior mitral leaflet with echodensity attached to   the ventricular side of the posterior leaflet(1.0x 1.2cm) with restriction   excursion. Mild centrally directed mitral regurgitation. Mild tricuspid regurgitation. ED TRIAGE VITALS  BP: (!) 119/59, Temp: 98 °F (36.7 °C), Pulse: 63, Resp: 18, SpO2: 94 %    Vitals:    04/13/22 0648 04/13/22 0700   BP: (!) 129/45 (!) 119/59   Pulse: 63    Resp: 18    Temp: 98 °F (36.7 °C)    SpO2: 94%    Weight: 172 lb (78 kg)    Height: 5' 10\" (1.778 m)          Histories  Past Medical History:   Diagnosis Date    Acute infection of bone (HCC)     infection of rt foot, resolved.     Acute osteomyelitis of phalanx of left hand ([facility]) 1/27/2022    Left third distal phalanx    Anemia of chronic disease     Arthritis     Breast cancer ([facility])     right breast, 2008/ bladder, 2006- last chemotherapy \"years ago\"    CAD (coronary artery disease)     Carpal tunnel syndrome     bilat - for OR left hand 3-17-20     Chronic diastolic CHF (congestive heart failure) ([address] 09/23/2014 9/23/14- echocardiogram revealed moderate LV concentric hypertrophy, stage III diastolic dysfunction, mild tricuspid regurgitation    CKD (chronic kidney disease) stage 4, GFR 15-29 ml/min ([facility])     Diabetic retinopathy ([address]     Glaucoma     Hemodialysis patient ([address]     [facility]- Dr. Víctor Gastelum - left arm fistula     Hyperkalemia, diminished renal excretion 11/9/2017    Hyperlipidemia     Hypertension     Hypoglycemia unawareness in type 1 diabetes mellitus ([address] 11/7/2017    Insulin dependent type 2 diabetes mellitus ([address]     Neuropathy     feet    Osteomyelitis due to secondary diabetes ([address]     rt great toe with amputation    Patient is Yazidism 11/7/2017    Refusal of blood product     patient states she dose not take blood transfusion    Ventricular hypertrophy     Vitreous hemorrhage ([address]     left eye     Past Surgical History:   Procedure Laterality Date    AMPUTATION      right great toe    ANKLE SURGERY      correction on charcot joint of right ankle    CARDIAC CATHETERIZATION  12/09/2021    Dr Sergo Villanueva Left 3/17/2020    LEFT CARPAL TUNNEL RELEASE performed by Chantel Mesa MD at [address]      bilateral    CHOLECYSTECTOMY      COLONOSCOPY      CYSTOSCOPY      DIALYSIS FISTULA CREATION Left 01/31/2018    upper arm/Dr. Chris Mendieta    ECHO COMPL W DOP COLOR FLOW  2/14/2013         ECHO COMPLETE  9/17/2013         FINGER AMPUTATION Left 1/20/2022    AMPUTATION OF LEFT THIRD DIGIT, DISTAL PHALANX performed by Lilibeth Reagan MD at [address] [facility] [address]      right    OTHER SURGICAL HISTORY  9/27/2011    PPV, membranectomy, laser Right eye    OTHER SURGICAL HISTORY  insertion lumbar drain insertion    10/12/`14    OTHER SURGICAL HISTORY  10/22/15 percutaneous lead placement for spinal cord stimulator    OTHER SURGICAL HISTORY  11/03/2015    Spinal; cord stimulator- turned off as of 3-10-20     WI AV ANAST,UP ARM BASILIC VEIN TRANSPOSIT Left 5/15/2018    TRANSPOSITION STAGE II AV FISTULA - LEFT UPPER ARM performed by Enedina Martinez MD at [address] [facility] ANGIOACCESS AV FISTULA Left 9/25/2018    SUPERFICIALIZATION AV FISTULA - LEFT ARM performed by Enedina Martinez MD at [address] [facility] W/VITRECTOMY ANY METH Left 4/10/2018    PARS PLANA VITRECTOMY 25 GAUGE RETINAL DETACHMENT REPAIR air fluid exchange, endolaser performed by Nash Bernheim, MD at [address] TRANSESOPHAGEAL ECHOCARDIOGRAM  11/11/2021    Dr. Armando Mtz TUNNELED [address]  11/15/2017    VITRECTOMY Left 04/10/2018    PARS PLANA VITRECTOMY; RETINAL DETACHMENT REPAIR; GAS BUBBLE; LASER LEFT EYE     Family History   Problem Relation Age of Onset    Breast Cancer Mother 61    Hypertension Mother     Heart Disease Father     Prostate Cancer Father     Breast Cancer Maternal Grandmother 60       Home Medications  Prior to Admission medications    Medication Sig Start Date End Date Taking? Authorizing Provider   midodrine (PROAMATINE) 5 MG tablet Take 5 mg by mouth daily as needed   Yes Historical Provider, MD   gabapentin (NEURONTIN) 100 MG capsule Take 2 capsules by mouth 3 times daily for 180 days.  3/24/22 9/20/22  Ricardo Lowery MD   atorvastatin (LIPITOR) 80 MG tablet Take 80 mg by mouth nightly    Historical Provider, MD   bumetanide (BUMEX) 2 MG tablet Take 2 mg by mouth three times a week Given Sunday,Tuesday,Thursday    Historical Provider, MD   isosorbide mononitrate (IMDUR) 30 MG extended release tablet Take 30 mg by mouth daily    Historical Provider, MD   insulin glargine (LANTUS) 100 UNIT/ML injection vial Inject 5 Units into the skin nightly     Historical Provider, MD   lidocaine-prilocaine (EMLA) 2.5-2.5 % cream Apply topically as needed for Pain     Historical Provider, MD   hydrALAZINE (APRESOLINE) 10 MG tablet Take 1 tablet by mouth 2 times daily 2/3/22   Austyn Lucas MD   metoprolol succinate (TOPROL XL) 25 MG extended release tablet Take 1 tablet by mouth daily 11/24/21   Austyn Lucas MD   lanthanum (FOSRENOL) 1000 MG chewable tablet Take 1,000 mg by mouth 3 times daily (with meals)  8/9/21   Historical Provider, MD   allopurinol (ZYLOPRIM) 100 MG tablet Take 1 tablet by mouth daily 9/7/21   Austyn Lucas MD   ticagrelor (BRILINTA) 90 MG TABS tablet Take 1 tablet by mouth 2 times daily 9/7/21   Austyn Lucas MD   B Complex-C-Folic Acid (BONI CAPS) 1 MG CAPS Take 1 mg by mouth nightly     Historical Provider, MD   omeprazole (PRILOSEC) 20 MG delayed release capsule Take 20 mg by mouth daily as needed     Historical Provider, MD MCHUGH 0.01 % SOLN ophthalmic drops Place 1 drop into the right eye nightly  6/9/20   Historical Provider, MD   COMBIGAN 0.2-0.5 % ophthalmic solution Place 1 drop into the right eye every 12 hours  8/1/19   Historical Provider, MD       Allergies  Furosemide    Social Hx  Social History     Socioeconomic History    Marital status: Single     Spouse name: Not on file    Number of children: Not on file    Years of education: Not on file    Highest education level: Not on file   Occupational History    Not on file   Tobacco Use    Smoking status: Never Smoker    Smokeless tobacco: Never Used   Vaping Use    Vaping Use: Never used   Substance and Sexual Activity    Alcohol use: No    Drug use: No    Sexual activity: Not Currently   Other Topics Concern    Not on file   Social History Narrative    Not on file     Social Determinants of Health     Financial Resource Strain:     Difficulty of Paying Living Expenses: Not on file   Food Insecurity:     Worried About [address] in the Last Year: Not on file    [address] in the Last Year: Not on file   Transportation Needs:     Lack of Transportation (Medical): Not on file    Lack of Transportation (Non-Medical): Not on file   Physical Activity:     Days of Exercise per Week: Not on file    Minutes of Exercise per Session: Not on file   Stress:     Feeling of Stress : Not on file   Social Connections:     Frequency of Communication with Friends and Family: Not on file    Frequency of Social Gatherings with Friends and Family: Not on file    Attends Ep[employer] Services: Not on file    Active Member of [address] [place] [address] or Organizations: Not on file    Attends Club or Organization Meetings: Not on file    Marital Status: Not on file   Intimate Partner Violence:     Fear of Current or Ex-Partner: Not on file    Emotionally Abused: Not on file    Physically Abused: Not on file    Sexually Abused: Not on file   Housing Stability:     Unable to Pay for Housing in the Last Year: Not on file    Number of [place] in the Last Year: Not on file    Unstable Housing in the Last Year: Not on file       Review of Systems  All bolded are positive; please see HPI  General:  Fever, chills, diaphoresis, fatigue, malaise, night sweats, weight loss  Psychological:  Anxiety, disorientation, hallucinations. ENT:  Epistaxis, headaches, vertigo, visual changes. Cardiovascular:  Chest pain, irregular heartbeats, palpitations, paroxysmal nocturnal dyspnea. Respiratory:  Shortness of breath, coughing, sputum production, hemoptysis, wheezing, orthopnea.   Gastrointestinal:  Nausea, vomiting, diarrhea, heartburn, constipation, abdominal pain, hematemesis, hematochezia, melena, acholic stools  Genito-Urinary:  Dysuria, urgency, frequency, hematuria  Musculoskeletal:  Joint pain, joint stiffness, joint swelling, muscle pain, back pain  Neurology:  Headache, focal neurological deficits, weakness, numbness, paresthesia  Derm:  Rashes, ulcers, excoriations, bruising  Extremities:  Decreased ROM, peripheral edema, mottling    Physical Examination  Vitals:  BP (!) 119/59   Pulse 63   Temp 98 °F (36.7 °C)   Resp 18   Ht 5' 10\" (1.778 m)   Wt 172 lb (78 kg)   SpO2 94%   BMI 24.68 kg/m²   General Appearance:  awake, alert, and oriented to person, place, time, and purpose; appears stated age and cooperative; no apparent distress no labored breathing  HEENT:  NCAT; PERRL; conjunctiva pink, sclera clear  Neck:  no adenopathy, bruit, JVD, tenderness, masses, or nodules; supple, symmetrical, trachea midline, thyroid not enlarged  Lung:  clear to auscultation bilaterally; no use of accessory muscles; no rhonchi, rales, or crackles  Heart:  regular rate and regular rhythm without murmur, rub, or gallop  Abdomen:  soft, nontender, nondistended; normoactive bowel sounds; no organomegaly  Extremities:  extremities normal, atraumatic, no cyanosis or edema  Musculokeletal:  no joint swelling, no muscle tenderness. ROM normal in all joints of extremities.    Neurologic:  mental status A&Ox3, thought content appropriate; CN II-XII grossly intact; sensation intact, motor strength 5/5 globally; no slurred speech    Laboratory Data  Recent Results (from the past 24 hour(s))   EKG 12 Lead    Collection Time: 04/13/22  7:15 AM   Result Value Ref Range    Ventricular Rate 57 BPM    Atrial Rate 57 BPM    P-R Interval 168 ms    QRS Duration 98 ms    Q-T Interval 476 ms    QTc Calculation (Bazett) 463 ms    P Axis 38 degrees    R Axis 75 degrees    T Axis -11 degrees   CBC with Auto Differential    Collection Time: 04/13/22  7:17 AM   Result Value Ref Range    WBC 7.9 4.5 - 11.5 E9/L    RBC 3.51 3.50 - 5.50 E12/L    Hemoglobin 9.5 (L) 11.5 - 15.5 g/dL    Hematocrit 32.6 (L) 34.0 - 48.0 %    MCV 92.9 80.0 - 99.9 fL    MCH 27.1 26.0 - 35.0 pg    MCHC 29.1 (L) 32.0 - 34.5 %    RDW 17.2 (H) 11.5 - 15.0 fL    Platelets 703 145 - 627 E9/L    MPV 10.1 7.0 - 12.0 fL    Neutrophils % 74.2 43.0 - 80.0 %    Immature Granulocytes % 1.1 0.0 - 5.0 % Lymphocytes % 14.2 (L) 20.0 - 42.0 %    Monocytes % 8.7 2.0 - 12.0 %    Eosinophils % 1.4 0.0 - 6.0 %    Basophils % 0.4 0.0 - 2.0 %    Neutrophils Absolute 5.87 1.80 - 7.30 E9/L    Immature Granulocytes # 0.09 E9/L    Lymphocytes Absolute 1.12 (L) 1.50 - 4.00 E9/L    Monocytes Absolute 0.69 0.10 - 0.95 E9/L    Eosinophils Absolute 0.11 0.05 - 0.50 E9/L    Basophils Absolute 0.03 0.00 - 0.20 E9/L   Comprehensive Metabolic Panel w/ Reflex to MG    Collection Time: 04/13/22  7:17 AM   Result Value Ref Range    Sodium 140 132 - 146 mmol/L    Potassium reflex Magnesium 4.6 3.5 - 5.0 mmol/L    Chloride 100 98 - 107 mmol/L    CO2 29 22 - 29 mmol/L    Anion Gap 11 7 - 16 mmol/L    Glucose 72 (L) 74 - 99 mg/dL    BUN 32 (H) 6 - 23 mg/dL    CREATININE 5.2 (H) 0.5 - 1.0 mg/dL    GFR Non-African American 10 >=60 mL/min/1.73    GFR African American 10     Calcium 8.8 8.6 - 10.2 mg/dL    Total Protein 6.5 6.4 - 8.3 g/dL    Albumin 3.1 (L) 3.5 - 5.2 g/dL    Total Bilirubin 0.3 0.0 - 1.2 mg/dL    Alkaline Phosphatase 109 (H) 35 - 104 U/L    ALT 22 0 - 32 U/L    AST 22 0 - 31 U/L   Troponin    Collection Time: 04/13/22  7:17 AM   Result Value Ref Range    Troponin, High Sensitivity 219 (H) 0 - 9 ng/L   Brain Natriuretic Peptide    Collection Time: 04/13/22  7:17 AM   Result Value Ref Range    Pro-BNP >70,000 (H) 0 - 125 pg/mL   Troponin    Collection Time: 04/13/22  9:13 AM   Result Value Ref Range    Troponin, High Sensitivity 213 (H) 0 - 9 ng/L       Imaging  CT LUMBAR SPINE WO CONTRAST    Result Date: 4/13/2022  EXAMINATION: CT OF THE LUMBAR SPINE WITHOUT CONTRAST  4/13/2022 TECHNIQUE: CT of the lumbar spine was performed without the administration of intravenous contrast. Multiplanar reformatted images are provided for review. Adjustment of mA and/or kV according to patient size was utilized.   Dose modulation, iterative reconstruction, and/or weight based adjustment of the mA/kV was utilized to reduce the radiation dose to as low as reasonably achievable. COMPARISON: CT lumbar spine March 8, 2022 HISTORY: ORDERING SYSTEM PROVIDED HISTORY: pain, no trauma TECHNOLOGIST PROVIDED HISTORY: Reason for exam:->pain, no trauma Decision Support Exception - unselect if not a suspected or confirmed emergency medical condition->Emergency Medical Condition (MA) What reading provider will be dictating this exam?->CRC FINDINGS: BONES/ALIGNMENT: Demonstrated is posterior fixation hardware seen from L3-L5 with decompressive laminectomy. No evidence of hardware complications. L1-L2: There is soft tissue edema adjacent to the L1 disc. There is new loss of height at the L1 inferior endplate extending into the vertebral body when compared to prior exam.  There is pressure free hypertrophy of the ligamentum flavum and protrusion of the intervertebral disc resulting in moderate spinal canal stenosis. Again demonstrated is moderate to severe neural foraminal stenosis. L2-L3: Again demonstrated is severe loss of height at L2 with anterolisthesis L2 on L3 measuring approximately 1.4 cm. Severe spinal canal stenosis again demonstrated at L2-L3. Severe bilateral neural foraminal stenosis. L3-L4: Status post laminectomy and posterior fixation. Limited evaluation due to hardware streak artifacts. Again demonstrated is severe loss of disc height. Mild neural foraminal stenosis. L4-L5: Status post laminectomy and posterior fixation. Limited evaluation due to hardware streak artifacts. Again demonstrated is severe loss of disc height. Mild neural foraminal stenosis. L5-S1: Mild to moderate moderate to severe loss of disc height with vacuum phenomenon. There is mild spinal canal stenosis. Facet hypertrophy identified. Moderate right and severe left neural foraminal stenosis. SOFT TISSUES/RETROPERITONEUM: No paraspinal mass is seen. New loss of height of L1 inferior endplate with increased soft tissue inflammation.   Finding is suspicious for possible osteomyelitis. Further evaluation with lumbar spine MRI may be considered. Assessment and Plan  Patient is a 78 y.o. female who presented with back pain. The active problem list is as follows:    · Back pain with spinal stenosis and possible osteomyelitis of lumbar spine -Neurosurgery consultation. Pain control. Check sed rate and CRP. ID consulted. Patient was unable to get MRI due to stimulator. · CAD- with h/o LAD and RCA stents 5/2021 and with cath 12/21 showing patent LAD stent, high grade focal in stent restenosis of the RCA and known  of OM2. Was supposed to have CABG x 2 with resection of the MV fibroelastoma and possible mitral valve repair, however developed osteo of finger so this was delayed. Cannot be percutaneously revascularized prior to back surgery since she will need to be on DAPT if that is done and which will need to be stopped prior to spinal surgery. Resume aspirin and Brilinta ASAP after surgery per neurosurgeries discretion. Will need to follow with CTS OP  · Mitral valve Papillary fibroelastoma   · End-stage renal disease on hemodialysis- Monday Wednesday Friday, consult nephrology for renal replacement therapy. · Chronic anemia secondary to end-stage renal disease: Monitor H&H  · Essential hypertension: Continue outpatient medications with hydralazine and metoprolol. 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  · Jehovah Witness      · Routine labs in the morning. · DVT prophylaxis. · Please see orders for further management and care.         Gabo Wallis DO    10:06 AM  4/13/2022

## 2022-04-13 NOTE — FLOWSHEET NOTE
04/13/22 1620   Vital Signs   BP (!) 87/51   Temp 98 °F (36.7 °C)   Pulse 66   Resp 18   Post-Hemodialysis Assessment   Post-Treatment Procedures Blood returned; Access bleeding time < 10 minutes   Machine Disinfection Process Acid/Vinegar Clean;Heat Disinfect; Exterior Machine Disinfection   Rinseback Volume (ml) 300 ml   Total Liters Processed (l/min) 71.4 l/min   Dialyzer Clearance Clotted   Duration of Treatment (minutes) 180 minutes   Heparin amount administered during treatment (units) 0 units   Hemodialysis Intake (ml) 50 ml   Hemodialysis Output (ml) 1454 ml   NET Removed (ml) 1404 ml   Tolerated Treatment Fair   Patient Response to Treatment system clotted with 65 minutes remaining, Dr Rodriguez here ok to terminate treatment   Bilateral Breath Sounds Diminished   Edema Right upper extremity

## 2022-04-13 NOTE — CONSULTS
[address]                  [address] [place],  [facility]                                  CONSULTATION    PATIENT NAME: Stefano Cross                    :        1955  MED REC NO:   [id number]                            ROOM:       23  ACCOUNT NO:   [de-identified]                           ADMIT DATE: 2022  PROVIDER:     Deonte Hussein MD    CONSULT DATE:  2022    CHIEF COMPLAINT:  Pain in the back radiating down to the lower  extremities. HISTORY OF PRESENT ILLNESS:  This 81-year-old female who is known to me. She had undergone posterior lumbar interbody fusion L4-5 and L5-S1 and  subsequently had implantation of the spinal cord stimulator. She has  not used spinal cord stimulator for a while because it was not working  and not giving enough relief. The pain had been severe in the back and  radiating down to both lower extremities. Pain is more pronounced in  the back as compared to the legs and more so in the left lower extremity  as compared to the right. She had a CT scan of the lumbar spine  performed in 2022 which raised the possibility of spondylolisthesis  grade 2 at the level of L2-3. She was supposed to have the posterior  lumbar interbody fusion at that level in 2022 but she was not cleared  medically and was supposed to have cardiac surgery prior to having the  back surgery. She was given epidural nerve block. These did give her  some relief of pain. She was sent to [facility] facility. Now  she returns to the emergency room because of the severe back pain and is  unable to ambulate. The CT scan of the lumbar spine revealed new loss  of height of L1 inferior endplate with increased soft tissue  inflammation suspicious for possible osteomyelitis. The CT scan was  reviewed by me.     PAST MEDICAL HISTORY:  Acute infection of the back, osteomyelitis,  arthritis, breast cancer, coronary artery disease, carpal tunnel  syndrome, chronic diastolic CHF, CKD, diabetes, retinopathy, glaucoma,  hemodialysis patient, hyperkalemia, hyperlipidemia, hypertension,  insulin-dependent diabetes mellitus, and the patient is Jehovah's  Witness and ventricular hypertrophy, vitreous hemorrhage. PAST SURGICAL HISTORY:  Amputation of the right great toe, ankle  surgery, cardiac catheterization, carpal tunnel release, cataract  removal, cholecystectomy, colonoscopy, cystoscopy, dialysis fistula  creation, echo complete, finger amputation, mastectomy and percutaneous  placement of spinal cord stimulator lead and insertion of spinal cord  stimulator surgical leads, spinal cord decompression, transesophageal  echocardiogram, tunneled venous catheter placement and vitrectomy. FAMILY HISTORY:  Hypertension, heart disease, and prostate cancer. SOCIAL HISTORY:  Not a smoker. Does not use alcohol or street drugs. PHYSICAL EXAMINATION:  She is a well-developed, well-nourished female in  no acute distress. She is alert, awake and oriented to time, place and  person. Speech is good. Memory is good. Cranial nerves are grossly  intact. Movement of cervical spine is fair. She has no weakness in the  upper extremities. Hand  is equal bilaterally. She does have  weakness of the left lower extremity with dorsiflexion being 3 to 4/5  and quadriceps probably a 4/5. She can lift her leg off the bed on the  left side and the right side. Sensations are relatively intact in the  upper part of the extremities while she has neuropathy in the lower  part. I reviewed various studies. IMPRESSION:  Possible osteomyelitis of L1-2 and grade II  spondylolisthesis of L2-3 and status post lumbar interbody fusion L3-4  and L4-5 and multiple medical comorbidities. RECOMMENDATIONS:  1. Bone scan of the body. 2.  Possibility of osteomyelitis had to be confirmed. Recommend needle  biopsy.   I will be glad to schedule that after reviewing the bone scan. We will follow along.         Maya Huynh MD    D: 04/13/2022 11:43:55       T: 04/13/2022 11:48:32     [email or web address]  Job#: [id number]     Doc#: [id number]    CC:

## 2022-04-14 ENCOUNTER — ANESTHESIA (OUTPATIENT)
Dept: OPERATING ROOM | Age: 67
DRG: 477 | End: 2022-04-14
Payer: COMMERCIAL

## 2022-04-14 LAB
METER GLUCOSE: 106 MG/DL (ref 74–99)
METER GLUCOSE: 58 MG/DL (ref 74–99)
METER GLUCOSE: 66 MG/DL (ref 74–99)
METER GLUCOSE: 92 MG/DL (ref 74–99)

## 2022-04-14 PROCEDURE — 2580000003 HC RX 258

## 2022-04-14 PROCEDURE — 6370000000 HC RX 637 (ALT 250 FOR IP): Performed by: INTERNAL MEDICINE

## 2022-04-14 PROCEDURE — 6360000002 HC RX W HCPCS: Performed by: INTERNAL MEDICINE

## 2022-04-14 PROCEDURE — S5553 INSULIN LONG ACTING 5 U: HCPCS | Performed by: INTERNAL MEDICINE

## 2022-04-14 PROCEDURE — 2500000003 HC RX 250 WO HCPCS: Performed by: INTERNAL MEDICINE

## 2022-04-14 PROCEDURE — 2060000000 HC ICU INTERMEDIATE R&B

## 2022-04-14 PROCEDURE — 82962 GLUCOSE BLOOD TEST: CPT

## 2022-04-14 PROCEDURE — 2580000003 HC RX 258: Performed by: ANESTHESIOLOGY

## 2022-04-14 RX ORDER — SODIUM CHLORIDE 0.9 % (FLUSH) 0.9 %
5-40 SYRINGE (ML) INJECTION EVERY 12 HOURS SCHEDULED
Status: DISCONTINUED | OUTPATIENT
Start: 2022-04-14 | End: 2022-04-18 | Stop reason: HOSPADM

## 2022-04-14 RX ORDER — FENTANYL CITRATE 50 UG/ML
25 INJECTION, SOLUTION INTRAMUSCULAR; INTRAVENOUS
Status: DISCONTINUED | OUTPATIENT
Start: 2022-04-14 | End: 2022-04-22 | Stop reason: HOSPADM

## 2022-04-14 RX ORDER — SODIUM CHLORIDE 0.9 % (FLUSH) 0.9 %
SYRINGE (ML) INJECTION
Status: COMPLETED
Start: 2022-04-14 | End: 2022-04-14

## 2022-04-14 RX ORDER — SODIUM CHLORIDE 9 MG/ML
INJECTION, SOLUTION INTRAVENOUS PRN
Status: DISCONTINUED | OUTPATIENT
Start: 2022-04-14 | End: 2022-04-18 | Stop reason: HOSPADM

## 2022-04-14 RX ORDER — SODIUM CHLORIDE 0.9 % (FLUSH) 0.9 %
5-40 SYRINGE (ML) INJECTION PRN
Status: DISCONTINUED | OUTPATIENT
Start: 2022-04-14 | End: 2022-04-18 | Stop reason: HOSPADM

## 2022-04-14 RX ADMIN — DEXTROSE MONOHYDRATE 12.5 G: 25 INJECTION, SOLUTION INTRAVENOUS at 08:53

## 2022-04-14 RX ADMIN — OXYCODONE AND ACETAMINOPHEN 1 TABLET: 5; 325 TABLET ORAL at 12:07

## 2022-04-14 RX ADMIN — HYDRALAZINE HYDROCHLORIDE 10 MG: 10 TABLET, FILM COATED ORAL at 08:49

## 2022-04-14 RX ADMIN — METOPROLOL SUCCINATE 25 MG: 25 TABLET, EXTENDED RELEASE ORAL at 08:48

## 2022-04-14 RX ADMIN — ISOSORBIDE MONONITRATE 30 MG: 30 TABLET, EXTENDED RELEASE ORAL at 08:48

## 2022-04-14 RX ADMIN — SODIUM CHLORIDE, PRESERVATIVE FREE 10 ML: 5 INJECTION INTRAVENOUS at 08:49

## 2022-04-14 RX ADMIN — LATANOPROST 1 DROP: 50 SOLUTION OPHTHALMIC at 20:55

## 2022-04-14 RX ADMIN — LANTHANUM CARBONATE 1000 MG: 500 TABLET, CHEWABLE ORAL at 17:42

## 2022-04-14 RX ADMIN — TIMOLOL MALEATE 1 DROP: 5 SOLUTION OPHTHALMIC at 08:59

## 2022-04-14 RX ADMIN — OXYCODONE AND ACETAMINOPHEN 1 TABLET: 5; 325 TABLET ORAL at 20:55

## 2022-04-14 RX ADMIN — GABAPENTIN 200 MG: 100 CAPSULE ORAL at 20:55

## 2022-04-14 RX ADMIN — ATORVASTATIN CALCIUM 80 MG: 40 TABLET, FILM COATED ORAL at 20:55

## 2022-04-14 RX ADMIN — BRIMONIDINE TARTRATE 1 DROP: 2 SOLUTION/ DROPS OPHTHALMIC at 09:00

## 2022-04-14 RX ADMIN — BUMETANIDE 2 MG: 1 TABLET ORAL at 08:48

## 2022-04-14 RX ADMIN — ALLOPURINOL 100 MG: 100 TABLET ORAL at 10:34

## 2022-04-14 RX ADMIN — GABAPENTIN 200 MG: 100 CAPSULE ORAL at 12:07

## 2022-04-14 RX ADMIN — GABAPENTIN 200 MG: 100 CAPSULE ORAL at 08:48

## 2022-04-14 RX ADMIN — HYDRALAZINE HYDROCHLORIDE 10 MG: 10 TABLET, FILM COATED ORAL at 17:42

## 2022-04-14 RX ADMIN — INSULIN GLARGINE-YFGN 5 UNITS: 100 INJECTION, SOLUTION SUBCUTANEOUS at 20:55

## 2022-04-14 RX ADMIN — OXYCODONE AND ACETAMINOPHEN 1 TABLET: 5; 325 TABLET ORAL at 08:46

## 2022-04-14 RX ADMIN — BRIMONIDINE TARTRATE 1 DROP: 2 SOLUTION/ DROPS OPHTHALMIC at 20:55

## 2022-04-14 RX ADMIN — SODIUM CHLORIDE, PRESERVATIVE FREE 10 ML: 5 INJECTION INTRAVENOUS at 20:56

## 2022-04-14 RX ADMIN — LANTHANUM CARBONATE 1000 MG: 500 TABLET, CHEWABLE ORAL at 08:03

## 2022-04-14 RX ADMIN — TIMOLOL MALEATE 1 DROP: 5 SOLUTION OPHTHALMIC at 20:55

## 2022-04-14 RX ADMIN — PANTOPRAZOLE SODIUM 40 MG: 40 TABLET, DELAYED RELEASE ORAL at 08:02

## 2022-04-14 RX ADMIN — NEPHROCAP 1 MG: 1 CAP ORAL at 20:55

## 2022-04-14 RX ADMIN — FENTANYL CITRATE 25 MCG: 50 INJECTION, SOLUTION INTRAMUSCULAR; INTRAVENOUS at 16:16

## 2022-04-14 ASSESSMENT — PAIN DESCRIPTION - ORIENTATION: ORIENTATION: RIGHT;LEFT

## 2022-04-14 ASSESSMENT — ENCOUNTER SYMPTOMS: DYSPNEA ACTIVITY LEVEL: AFTER AMBULATING 1 FLIGHT OF STAIRS

## 2022-04-14 ASSESSMENT — LIFESTYLE VARIABLES: SMOKING_STATUS: 0

## 2022-04-14 ASSESSMENT — PAIN SCALES - GENERAL
PAINLEVEL_OUTOF10: 0
PAINLEVEL_OUTOF10: 8
PAINLEVEL_OUTOF10: 8
PAINLEVEL_OUTOF10: 0
PAINLEVEL_OUTOF10: 9
PAINLEVEL_OUTOF10: 9

## 2022-04-14 ASSESSMENT — PAIN DESCRIPTION - PAIN TYPE: TYPE: ACUTE PAIN

## 2022-04-14 ASSESSMENT — PAIN DESCRIPTION - LOCATION: LOCATION: BACK;LEG

## 2022-04-14 NOTE — PROGRESS NOTES
Infectious Disease  Progress Note  [facility]    Chief Complaint: lumbar osteomyelitis    Subjective:  She has persistent stable back pain. No fever. Able to move LEs well. Scheduled Meds:   atorvastatin  80 mg Oral Nightly    allopurinol  100 mg Oral Daily    b complex-C-folic acid  1 mg Oral Nightly    bumetanide  2 mg Oral Once per day on Sun Tue Thu    gabapentin  200 mg Oral TID    hydrALAZINE  10 mg Oral BID    insulin glargine-yfgn  5 Units SubCUTAneous Nightly    isosorbide mononitrate  30 mg Oral Daily    latanoprost  1 drop Right Eye Nightly    pantoprazole  40 mg Oral QAM AC    metoprolol succinate  25 mg Oral Daily    [Held by provider] ticagrelor  90 mg Oral BID    lanthanum  1,000 mg Oral TID WC    brimonidine  1 drop Right Eye BID    And    timolol  1 drop Right Eye BID     Continuous Infusions:   dextrose       PRN Meds:lidocaine-prilocaine, midodrine, acetaminophen, melatonin, polyethylene glycol, glucose, dextrose, glucagon (rDNA), dextrose, ondansetron, oxyCODONE-acetaminophen    ROS:  As mentioned in subjective, all other systems negative      BP (!) 112/56   Pulse 66   Temp 98.2 °F (36.8 °C) (Oral)   Resp 18   Ht 5' 10\" (1.778 m)   Wt 172 lb (78 kg)   SpO2 96%   BMI 24.68 kg/m²     Physical Exam  Constitutional: The patient is awake, alert, and oriented. Skin: Warm and dry. No jaundice. HEENT: Eyes show round, and reactive pupils. Neck: Supple to movements. No lymphadenopathy. Chest: Clear to auscultation posteriorly  Cardiovascular: S1 and S2 are rhythmic and regular. No murmurs appreciated. Abdomen: Soft nontender  Extremities: No clubbing, no cyanosis, no edema. Musculoskeletal: Has good strength in bilateral lower extremities and upper extremities. Left upper extremity AV fistula site looks good. Has multiple prior scars lumbar spine no redness or swelling. Has tenderness in the lower back.   Neurological: Alert and oriented x 3,   Lines:

## 2022-04-14 NOTE — PROGRESS NOTES
[facility]  Nephrology Consult Note    Patient's Name: Enid Jessica     Reason for Consult:  dialysis      Chief Complaint:   Back pain  History Obtained From:  patient, past medical records and EMR     History of Present Illness:    Enid Jessica is a 78 y.o. female with a past medical history of neuropathy, hypertension, hyperlipidemia, breast cancer, and coronary artery disease. She presented to the ED on 4/13, per the ED provider note with back pain. Vital signs at presentation to the ED include temperature 98, respirations 18, pulse 63, /45, and she was 94% on room air. Lab data on presentation to the ED includes BUN 32, creatinine 5.2, proBNP > 70,000, and hemoglobin 9.5. Patient had a recent hospital stay from 3/9-3/18 for low back pain, during that time she had received epidural nerve block. We were consulted to see the patient for dialysis. Patient is known to our service and dialyzes at [address] MWF first shift via a left AV fistula. At present, patient was seen and examined on HD. She explained that she came home from rehab on Monday. She reported that she had too much pain this morning and decided to come to the hospital.  She did report a little bit of nausea this morning. She also explained that she has been experiencing some fatigue. She denies any chest pain or shortness of breath. She denies any abdominal pain or vomiting. Denies any headaches or dizziness. She denies any weakness.         Patient Active Problem List   Diagnosis    Diabetic retinopathy ([address]    Malignant neoplasm of right female breast ([address]    Atherosclerosis of native coronary artery of native heart without angina pectoris    Moderate obesity    Left ventricular hypertrophy    Herniated lumbar intervertebral disc    Lumbar degenerative disc disease    Pseudomeningocele    Lumbar radiculopathy    Lymphedema of arm    CKD (chronic kidney disease) stage 4, GFR 15-29 ml/min ([address]    Insulin dependent type 2 diabetes mellitus (HCC)    Anemia of chronic disease    Chronic diastolic CHF (congestive heart failure) (HCC)    Neuropathy    Hypertension    Glaucoma    Refusal of blood product    Pancytopenia ([facility])    Controlled type 2 diabetes mellitus with chronic kidney disease on chronic dialysis, with long-term current use of insulin ([facility])    Vitreous hemorrhage ([address]    Patient is Taoist    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 ([facility])    Hyperkalemia, diminished renal excretion    Chronic pain syndrome    Lumbar post-laminectomy syndrome    Myalgia    Cervicalgia    Diabetic peripheral neuropathy ([address]    ESRD (end stage renal disease) ([address]    Bilateral carpal tunnel syndrome    Spinal stenosis of lumbar region with neurogenic claudication    Cardiac arrest ([address]    ESRD (end stage renal disease) on dialysis ([address]    Mixed hyperlipidemia    Lymphedema of right upper extremity    Coronary artery disease involving native coronary artery of native heart with angina pectoris ([facility])    Ventricular tachycardia ([address]    Mitral valve disease    CAD in native artery    Lumbar stenosis without neurogenic claudication    Lumbar foraminal stenosis    Back pain    Intractable low back pain    Unable to ambulate         Subjective    4/14:  For lumbar bone vertebral biopsy today; she says back pain persists        Meds:     atorvastatin  80 mg Oral Nightly    allopurinol  100 mg Oral Daily    b complex-C-folic acid  1 mg Oral Nightly    bumetanide  2 mg Oral Once per day on Sun Tue Thu    gabapentin  200 mg Oral TID    hydrALAZINE  10 mg Oral BID    insulin glargine-yfgn  5 Units SubCUTAneous Nightly    isosorbide mononitrate  30 mg Oral Daily    latanoprost  1 drop Right Eye Nightly    pantoprazole  40 mg Oral QAM AC    metoprolol succinate  25 mg Oral Daily    [Held by provider] ticagrelor  90 mg Oral BID    lanthanum  1,000 mg Oral TID WC    brimonidine  1 drop Right Eye BID    And    timolol  1 drop Right Eye BID      dextrose       Meds prn:     lidocaine-prilocaine, midodrine, acetaminophen, melatonin, polyethylene glycol, glucose, dextrose, glucagon (rDNA), dextrose, ondansetron, oxyCODONE-acetaminophen    Meds prior to admission:     No current facility-administered medications on file prior to encounter. Current Outpatient Medications on File Prior to Encounter   Medication Sig Dispense Refill    midodrine (PROAMATINE) 5 MG tablet Take 5 mg by mouth daily as needed      gabapentin (NEURONTIN) 100 MG capsule Take 2 capsules by mouth 3 times daily for 180 days.  180 capsule 5    atorvastatin (LIPITOR) 80 MG tablet Take 80 mg by mouth nightly      bumetanide (BUMEX) 2 MG tablet Take 2 mg by mouth three times a week Given Sunday,Tuesday,Thursday      isosorbide mononitrate (IMDUR) 30 MG extended release tablet Take 30 mg by mouth daily      insulin glargine (LANTUS) 100 UNIT/ML injection vial Inject 5 Units into the skin nightly       lidocaine-prilocaine (EMLA) 2.5-2.5 % cream Apply topically as needed for Pain       hydrALAZINE (APRESOLINE) 10 MG tablet Take 1 tablet by mouth 2 times daily 180 tablet 1    metoprolol succinate (TOPROL XL) 25 MG extended release tablet Take 1 tablet by mouth daily 90 tablet 1    lanthanum (FOSRENOL) 1000 MG chewable tablet Take 1,000 mg by mouth 3 times daily (with meals)       allopurinol (ZYLOPRIM) 100 MG tablet Take 1 tablet by mouth daily 90 tablet 1    ticagrelor (BRILINTA) 90 MG TABS tablet Take 1 tablet by mouth 2 times daily 180 tablet 1    B Complex-C-Folic Acid (BONI CAPS) 1 MG CAPS Take 1 mg by mouth nightly       omeprazole (PRILOSEC) 20 MG delayed release capsule Take 20 mg by mouth daily as needed       LUMIGAN 0.01 % SOLN ophthalmic drops Place 1 drop into the right eye nightly       COMBIGAN 0.2-0.5 % ophthalmic solution Place 1 drop into the right eye every 12 hours   7     Allergies:    Furosemide      Physical Exam:    Patient Vitals for the past 24 hrs:   BP Temp Temp src Pulse Resp SpO2   04/14/22 0730 (!) 112/56 98.2 °F (36.8 °C) Oral 66 18 96 %   04/14/22 0500 (!) 125/53 98.4 °F (36.9 °C) Oral 67 17 --   04/13/22 2130 (!) 117/53 98.2 °F (36.8 °C) Oral 66 16 95 %   04/13/22 1645 131/68 -- -- 68 19 --   04/13/22 1620 (!) 87/51 98 °F (36.7 °C) -- 66 18 --   04/13/22 1530 96/70 -- -- (!) 48 -- --   04/13/22 1501 (!) 102/43 -- -- 62 -- --   04/13/22 1431 (!) 95/35 -- -- 56 -- --   04/13/22 1401 (!) 96/22 -- -- 62 -- --   04/13/22 1332 (!) 100/30 -- -- 61 -- --   04/13/22 1315 (!) 115/21 -- -- 63 -- --   04/13/22 1310 (!) 113/50 98 °F (36.7 °C) -- 85 -- --       Intake/Output Summary (Last 24 hours) at 4/14/2022 1236  Last data filed at 4/14/2022 0528  Gross per 24 hour   Intake 50 ml   Output 1454 ml   Net -1404 ml       General: Awake, alert, in distress due to back pain  Neck: No JVD noted  Lungs: Clear bilaterally upper, diminished to the bases bilaterally. Unlabored  CV: Regular rate and rhythm. No rub  Abd: Soft, nontender, nondistended. Active bowel sounds  Skin: Warm and dry. No rash on exposed extremities  Ext: 1+ RUE edema (history of breast CA); left AV fistula  Neuro: Awake, answers questions appropriately    Data:    Recent Labs     04/13/22  0717   WBC 7.9   HGB 9.5*   HCT 32.6*   MCV 92.9        Recent Labs     04/13/22  0717      K 4.6      CO2 29   CREATININE 5.2*   BUN 32*   LABGLOM 10   GLUCOSE 72*   CALCIUM 8.8     Vit D, 25-Hydroxy   Date Value Ref Range Status   03/11/2022 28 (L) 30 - 100 ng/mL Final     Comment:     <20 ng/mL. ........... Shaaron Money Deficient  20-30 ng/mL. ......... Shaaron Money Insufficient   ng/mL. ........ Shaaron Money Sufficient  >100 ng/mL. .......... Shaaron Money Toxic       PTH   Date Value Ref Range Status   03/11/2022 241 (H) 15 - 65 pg/mL Final     Recent Labs     04/13/22  0717   ALT 22   AST 22   ALKPHOS 109*   BILITOT 0.3     Recent Labs 04/13/22  0717   LABALBU 3.1*     Ferritin   Date Value Ref Range Status   11/09/2017 334 ng/mL Final     Comment:     FERRITIN Reference Ranges:  Adult Males   20 - 60 yrars:    30 - 400 ng/mL  Adult females 17 - 60 years:    13 - 150 ng/mL  Adults greater than 60 years:   no established reference range  Pediatrics:                     no established reference range       Iron   Date Value Ref Range Status   11/09/2017 33 (L) 37 - 145 mcg/dL Final     TIBC   Date Value Ref Range Status   11/09/2017 222 (L) 250 - 450 mcg/dL Final     Vitamin B-12   Date Value Ref Range Status   02/17/2017 1802 (H) 211 - 946 pg/mL Final     Folate   Date Value Ref Range Status   02/17/2017 >20.0 4.8 - 24.2 ng/mL Final     Lab Results   Component Value Date    COLORU Yellow 11/07/2017    NITRU Negative 11/07/2017    GLUCOSEU Negative 11/07/2017    GLUCOSEU NEGATIVE 08/27/2011    KETUA Negative 11/07/2017    UROBILINOGEN 0.2 11/07/2017    BILIRUBINUR Negative 11/07/2017    BILIRUBINUR NEGATIVE 08/27/2011     No results found for: ALVINO, CREURRAN, MACREATRATIO, OSMOU    No components found for: URIC    No results found for: LIPIDPAN    Assessment and Plan:    1. ESRD on HD  OP [facility] MWF first shift   via a left AV fistula  Continue HD MWF  Next treatment 4/15 she told me again for treatment yes please    2. Back pain  S/p epidural nerve block during last hospital stay  Infectious disease following for possible osteo  Blood cultures no growth to date  For bone biopsy  Neurosurgery and ID following      3. Hypertension  BP goal<140/80  BP at goal  Follow on current regimen and with HD    4. Anemia  Hemoglobin target 10-12  Hemoglobin 9.5 today  Will start JAYSHREE tomorrow if hgb <10 tomorrow  Transfuse for hgb < 7  Follow     5.  Secondary hyperparathyroidism of renal origin   on 3/11  Phos 5.8 on 3/11  Recheck Phos tomorrow  Follow    Joshua Milan MD

## 2022-04-14 NOTE — ANESTHESIA PRE PROCEDURE
Department of Anesthesiology  Preprocedure Note       Name:  Isak Fu   Age:  78 y.o.  :  1955                                          MRN:  [id number]         Date:  2022      Surgeon: Luh Pompa):  Jeancarlos Varela MD    Procedure: Procedure(s):  BONE BIOPSY PERCUTANEOUS L1 AND L2    Medications prior to admission:   Prior to Admission medications    Medication Sig Start Date End Date Taking? Authorizing Provider   midodrine (PROAMATINE) 5 MG tablet Take 5 mg by mouth daily as needed    Historical Provider, MD   gabapentin (NEURONTIN) 100 MG capsule Take 2 capsules by mouth 3 times daily for 180 days.  3/24/22 9/20/22  Jessie Loaiza MD   atorvastatin (LIPITOR) 80 MG tablet Take 80 mg by mouth nightly    Historical Provider, MD   bumetanide (BUMEX) 2 MG tablet Take 2 mg by mouth three times a week Given ,Tuesday,Thursday    Historical Provider, MD   isosorbide mononitrate (IMDUR) 30 MG extended release tablet Take 30 mg by mouth daily    Historical Provider, MD   insulin glargine (LANTUS) 100 UNIT/ML injection vial Inject 5 Units into the skin nightly     Historical Provider, MD   lidocaine-prilocaine (EMLA) 2.5-2.5 % cream Apply topically as needed for Pain     Historical Provider, MD   hydrALAZINE (APRESOLINE) 10 MG tablet Take 1 tablet by mouth 2 times daily 2/3/22   Donal Llanes MD   metoprolol succinate (TOPROL XL) 25 MG extended release tablet Take 1 tablet by mouth daily 21   Donal Llanes MD   lanthanum (FOSRENOL) 1000 MG chewable tablet Take 1,000 mg by mouth 3 times daily (with meals)  21   Historical Provider, MD   allopurinol (ZYLOPRIM) 100 MG tablet Take 1 tablet by mouth daily 21   Donal Llanes MD   ticagrelor (BRILINTA) 90 MG TABS tablet Take 1 tablet by mouth 2 times daily 21   Donal Llanes MD   B Complex-C-Folic Acid (BONI CAPS) 1 MG CAPS Take 1 mg by mouth nightly     Historical Provider, MD   omeprazole (PRILOSEC) 20 MG delayed release capsule Take 20 mg by mouth daily as needed     Historical Provider, MD MCHUGH 0.01 % SOLN ophthalmic drops Place 1 drop into the right eye nightly  6/9/20   Historical Provider, MD   COMBIGAN 0.2-0.5 % ophthalmic solution Place 1 drop into the right eye every 12 hours  8/1/19   Historical Provider, MD       Current medications:    No current facility-administered medications for this visit. No current outpatient medications on file.      Facility-Administered Medications Ordered in Other Visits   Medication Dose Route Frequency Provider Last Rate Last Admin    atorvastatin (LIPITOR) tablet 80 mg  80 mg Oral Nightly [facility] Madi DO   80 mg at 04/13/22 2043    allopurinol (ZYLOPRIM) tablet 100 mg  100 mg Oral Daily Shani Dulce Barraza DO   100 mg at 04/14/22 1034    b complex-C-folic acid (NEPHROCAPS) capsule 1 mg  1 mg Oral Nightly [facility] Madi DO   1 mg at 04/13/22 2116    bumetanide (BUMEX) tablet 2 mg  2 mg Oral Once per day on Sun Tue Thu Sandy Casiano DO   2 mg at 04/14/22 0848    gabapentin (NEURONTIN) capsule 200 mg  200 mg Oral TID Sandy Casiano DO   200 mg at 04/14/22 1207    hydrALAZINE (APRESOLINE) tablet 10 mg  10 mg Oral BID Sandy Casiano DO   10 mg at 04/14/22 0849    insulin glargine-yfgn (SEMGLEE-YFGN) injection vial 5 Units  5 Units SubCUTAneous Nightly Sandy Casiano DO   5 Units at 04/13/22 2043    isosorbide mononitrate (IMDUR) extended release tablet 30 mg  30 mg Oral Daily [facility] Madi DO   30 mg at 04/14/22 0848    lidocaine-prilocaine (EMLA) cream   Topical PRN Sandy Casiano DO        latanoprost (XALATAN) 0.005 % ophthalmic solution 1 drop  1 drop Right Eye Nightly [facility] Madi DO        midodrine (PROAMATINE) tablet 5 mg  5 mg Oral Daily PRN [facility] Madi DO        pantoprazole (PROTONIX) tablet 40 mg  40 mg Oral QAM AC [facility] Madi DO   40 mg at 04/14/22 0802    metoprolol succinate (TOPROL XL) extended release tablet 25 mg  25 mg Oral Daily Ezekiel Mon, DO   25 mg at 04/14/22 0848    [Held by provider] ticagrelor (BRILINTA) tablet 90 mg  90 mg Oral BID [facility] TUAN Barraza, DO        acetaminophen (TYLENOL) tablet 650 mg  650 mg Oral Q4H PRN Jacob Mon, DO        melatonin tablet 3 mg  3 mg Oral Nightly PRN [facility] Madi, DO        polyethylene glycol (GLYCOLAX) packet 17 g  17 g Oral Daily PRN [facility] Madi, DO        glucose (GLUTOSE) 40 % oral gel 15 g  15 g Oral PRN [facility] Madi, DO        dextrose 50 % IV solution  12.5 g IntraVENous PRN Jacob Mon, DO   12.5 g at 04/14/22 0853    glucagon (rDNA) injection 1 mg  1 mg IntraMUSCular PRN [facility] Madi, DO        dextrose 5 % solution  100 mL/hr IntraVENous PRN [facility] Madi, DO        lanthanum [facility]) chewable tablet 1,000 mg  1,000 mg Oral TID  Frankie Barraza, DO   1,000 mg at 04/14/22 0803    ondansetron (ZOFRAN) injection 4 mg  4 mg IntraVENous Q6H PRN [facility] Madi, DO        brimonidine (ALPHAGAN) 0.2 % ophthalmic solution 1 drop  1 drop Right Eye BID [facility] Madi, DO   1 drop at 04/14/22 0900    And    timolol (TIMOPTIC) 0.5 % ophthalmic solution 1 drop  1 drop Right Eye BID Jacob Mon, DO   1 drop at 04/14/22 0859    oxyCODONE-acetaminophen (PERCOCET) 5-325 MG per tablet 1 tablet  1 tablet Oral Q4H PRN Ezekiel Mon, DO   1 tablet at 04/14/22 1207       Allergies: Allergies   Allergen Reactions    Furosemide Swelling and Other (See Comments)     Patient states \"I swelled up like a pig. \" states her kidneys shut down         Problem List:    Patient Active Problem List   Diagnosis Code    Diabetic retinopathy ([facility] E11.319    Malignant neoplasm of right female breast ([facility] C50.911    Atherosclerosis of native coronary artery of native heart without angina pectoris I25.10    Moderate obesity E66.8    Left ventricular hypertrophy I51.7    Herniated lumbar intervertebral hand ([address] 1/27/2022    Left third distal phalanx    Anemia of chronic disease     Arthritis     Breast cancer ([address]     right breast, 2008/ bladder, 2006- last chemotherapy \"years ago\"    CAD (coronary artery disease)     Carpal tunnel syndrome     bilat - for OR left hand 3-17-20     Chronic diastolic CHF (congestive heart failure) ([address] 09/23/2014 9/23/14- echocardiogram revealed moderate LV concentric hypertrophy, stage III diastolic dysfunction, mild tricuspid regurgitation    CKD (chronic kidney disease) stage 4, GFR 15-29 ml/min ([facility])     Diabetic retinopathy ([address]     Glaucoma     Hemodialysis patient ([address]     [facility]- Dr. Linda Rodas - left arm fistula     Hyperkalemia, diminished renal excretion 11/9/2017    Hyperlipidemia     Hypertension     Hypoglycemia unawareness in type 1 diabetes mellitus ([address] 11/7/2017    Insulin dependent type 2 diabetes mellitus ([address]     Neuropathy     feet    Osteomyelitis due to secondary diabetes ([address]     rt great toe with amputation    Patient is [employer] 11/7/2017    Refusal of blood product     patient states she dose not take blood transfusion    Ventricular hypertrophy     Vitreous hemorrhage ([address]     left eye       Past Surgical History:        Procedure Laterality Date    AMPUTATION      right great toe    ANKLE SURGERY      correction on charcot joint of right ankle    CARDIAC CATHETERIZATION  12/09/2021    Dr Jose Cleveland Left 3/17/2020    LEFT CARPAL TUNNEL RELEASE performed by Lorena Encinas MD at [address]      bilateral    CHOLECYSTECTOMY      COLONOSCOPY      CYSTOSCOPY      DIALYSIS FISTULA CREATION Left 01/31/2018    upper arm/Dr. Deyanira Rey    ECHO COMPL W DOP COLOR FLOW  2/14/2013         ECHO COMPLETE  9/17/2013         FINGER AMPUTATION Left 1/20/2022    AMPUTATION OF LEFT THIRD DIGIT, DISTAL PHALANX performed by Mendy Cm MD at [address] MASTECTOMY      right    OTHER SURGICAL HISTORY  9/27/2011    PPV, membranectomy, laser Right eye    OTHER SURGICAL HISTORY  insertion lumbar drain insertion    10/12/`14    OTHER SURGICAL HISTORY  10/22/15    percutaneous lead placement for spinal cord stimulator    OTHER SURGICAL HISTORY  11/03/2015    Spinal; cord stimulator- turned off as of 3-10-20     KS AV ANAST,UP ARM BASILIC VEIN TRANSPOSIT Left 5/15/2018    TRANSPOSITION STAGE II AV FISTULA - LEFT UPPER ARM performed by Alcides Rooney MD at [facility] ANGIOACCESS AV FISTULA Left 9/25/2018    SUPERFICIALIZATION AV FISTULA - LEFT ARM performed by Alcides Rooney MD at [address] [facility] W/VITRECTOMY ANY METH Left 4/10/2018    PARS PLANA VITRECTOMY 25 GAUGE RETINAL DETACHMENT REPAIR air fluid exchange, endolaser performed by Abdullahi Victor MD at [address] [place] [address] Drive TRANSESOPHAGEAL ECHOCARDIOGRAM  11/11/2021    Dr. Nery Alexander TUNNELED [address]  11/15/2017    VITRECTOMY Left 04/10/2018    PARS PLANA VITRECTOMY; RETINAL DETACHMENT REPAIR; GAS BUBBLE; LASER LEFT EYE       Social History:    Social History     Tobacco Use    Smoking status: Never Smoker    Smokeless tobacco: Never Used   Substance Use Topics    Alcohol use: No                                Counseling given: Not Answered      Vital Signs (Current): There were no vitals filed for this visit.                                            BP Readings from Last 3 Encounters:   04/14/22 (!) 112/56   03/24/22 (!) 103/32   03/17/22 132/69       NPO Status:    Last solid consumption before 2359 on 11/10/21  Sips with meds at 0600 on 11/11/21                                                                             BMI:   Wt Readings from Last 3 Encounters:   04/13/22 172 lb (78 kg)   03/24/22 172 lb (78 kg)   03/16/22 179 lb 10.8 oz (81.5 kg)     There is no height or weight on file to calculate BMI.    CBC:   Lab Results Component Value Date    WBC 7.9 04/13/2022    RBC 3.51 04/13/2022    HGB 9.5 04/13/2022    HCT 32.6 04/13/2022    MCV 92.9 04/13/2022    RDW 17.2 04/13/2022     04/13/2022       CMP:   Lab Results   Component Value Date     04/13/2022    K 4.6 04/13/2022     04/13/2022    CO2 29 04/13/2022    BUN 32 04/13/2022    CREATININE 5.2 04/13/2022    GFRAA 10 04/13/2022    LABGLOM 10 04/13/2022    GLUCOSE 72 04/13/2022    GLUCOSE 46 04/02/2012    PROT 6.5 04/13/2022    CALCIUM 8.8 04/13/2022    BILITOT 0.3 04/13/2022    ALKPHOS 109 04/13/2022    AST 22 04/13/2022    ALT 22 04/13/2022       POC Tests: No results for input(s): POCGLU, POCNA, POCK, POCCL, POCBUN, POCHEMO, POCHCT in the last 72 hours.     Coags:   Lab Results   Component Value Date    PROTIME 11.1 01/20/2022    PROTIME 10.4 02/15/2012    INR 1.0 01/20/2022    APTT 38.0 01/20/2022       HCG (If Applicable): No results found for: PREGTESTUR, PREGSERUM, HCG, HCGQUANT     ABGs: No results found for: PHART, PO2ART, TZY0PMF, HEL2ZCK, BEART, E5YLDNER     Type & Screen (If Applicable):  Lab Results   Component Value Date    LABABO O 08/30/2011    [address] POS 08/30/2011       Drug/Infectious Status (If Applicable):  No results found for: HIV, HEPCAB    COVID-19 Screening (If Applicable):   Lab Results   Component Value Date    COVID19 Not Detected 04/01/2022               CXR 5/19/21  FINDINGS:   The lungs are without acute focal process.  There is no effusion or   pneumothorax.  Cardiomegaly.  The osseous structures are without acute   process.  Right-sided port a catheter tip in the SVC.           Impression   No acute process.       Cardiomegaly.                     CARDIAC STRESS TEST 5/20/21  FINDINGS: The overall quality of the study was adequate.       Left ventricular cavity size was noted to be dilated during stress and   rest images       Rotational analog analysis demonstrated show no significant motion   artifact       The gated SPECT stress imaging in the short, vertical long, and   horizontal long axis demonstrated severe defect was present in the   apical wall(s) that was large sized by quantification.              There also was a severe defect present in the septal wall(s) that was   large sized by quantification.       There is also mild defect present in the small inferior apical that   are small in size       There is also a mild defect present in the anterior septal wall that   was small in size       The resting images partially reduced. The anterolateral wall only.       Gated SPECT left ventricular ejection fraction was calculated to be   25%, with apical and septal akinesis, moderate global hypokinesis.         Impression       The myocardial perfusion imaging was abnormal       The abnormality was a large fixed defect in the apical and septal   walls; small fixed defect in the inferoapical wall; partially   reversible defect in the small area of anterolateral wall.       Overall left ventricular systolic function was severely reduced, EF   25% with apical septal akinesis, moderate global hypokinesis. Dilated   left ventricle during stress and rest.       High risk myocardial perfusion study       Compared to previous study from August 2011 which showed large lateral   wall ischemia with EF 66%.                     CARDIAC CATH 5/21/21  CONCLUSION:  1. Coronary artery disease:  a. Left main:  20% eccentric mid vessel narrowing.  b.  LAD:  50% proximal vessel narrowing and 95% mid vessel stenosis. Trivial diagonal branch with 90% stenosis. c.  LCX:  Occluded after a small caliber first marginal branch which is  a chronic total occlusion. The second larger marginal branch filled  late. d.  RCA:  Large, dominant vessel with 90% heavily calcified mid vessel  stenosis. 50% disease at the level of the crux and 70% ostial stenosis  of the posterior descending artery branch.   2.  Markedly elevated left ventricular end-diastolic pressure. 3.  Successful balloon angioplasty with deployment of drug-eluting  coronary stent to the mid LAD with very good results. 4.  Successful balloon angioplasty with deployment of drug-eluting  coronary stent to the mid RCA with poststent deployment dilatation with  a larger high-pressure noncompliant balloon with good results.               EKG 9/28/21  Sinus  Rhythm   -Poor R-wave progression -nonspecific -consider old anterior infarct. -  Diffuse nonspecific T-abnormality. ABNORMAL                 ECHO 8/24/21 EF 50-55%   Findings      Left Ventricle   Left ventricular size is grossly normal.   Ejection fraction is visually estimated at 50-55%. Biplane EF 58%   Mild left ventricular concentric hypertrophy noted. No regional wall motion abnormalities seen. Left Atrium   The left atrium is mildly to moderately dilated. Mitral Valve   Moderate mitral annular calcification. 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 RVSP is 40 mmHg. Pericardial Effusion   There is a trivial pericardial effusion noted. Conclusions      Summary   Limited study ordered. Left ventricular size is grossly normal.   Ejection fraction is visually estimated at 50-55%. Biplane EF 58%   Mild left ventricular concentric hypertrophy noted. No regional wall motion abnormalities seen. 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 Multiple missing and chipped teeth. Denies loose teeth. Pulmonary:   (+) decreased breath sounds,      (-) not a current smoker          Patient did not smoke on day of surgery.                  Cardiovascular:  Exercise tolerance: poor (<4 METS), (+) hypertension:, valvular problems/murmurs (papillary fibroelastoma of mitral valve):, CAD:, CABG/stent (cardiac arrest 5/2021 s/p stent x1):, dysrhythmias: ventricular tachycardia, CHF:, FRENCH: after ambulating 1 flight of stairs,     (-)  angina (last c/p in 5/2021)    ECG reviewed  Rhythm: regular  Rate: normal  Echocardiogram reviewed  Stress test reviewed  Cleared by cardiology     Beta Blocker:  Dose within 24 Hrs      ROS comment: EKG:  Sinus bradycardia 57, Nonspecific T wave abnormality  Abnormal ECG  When compared with ECG of 10-MAR-2022 11:36,  Significant changes have occurred  Confirmed by Andrea Khanna ([id number]) on 4/13/2022 12:43:13 PM    ECHO:   Limited study ordered. Left ventricular size is grossly normal.   Ejection fraction is visually estimated at 50-55%. Biplane EF 58%   Mild left ventricular concentric hypertrophy noted. No regional wall motion abnormalities seen. 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  Mild disease involving left anterior descending artery with a patent stent in the mid segment. 2.  Totally occluded left circumflex artery. 3.  Total occlusion of the second OM branch with left-to-left collaterals. 4.  Significant ostial RCA and significant mid RCA disease as mentioned above.     RECOMMENDATIONS:  Continue current treatment as per CT Surgery. Neuro/Psych:   (+) neuromuscular disease (diabetic retinopathy, vitreous hemorrhage of left eye, glaucoma, herniated lumbar intervertebral disc, DDD, pseudomeningocele, chronic pain, myalgia):,             GI/Hepatic/Renal:   (+) renal disease (last IHD 11/10/21): ESRD and dialysis,           Endo/Other:    (+) Diabetes (right great toe amputation d/t osteomyelitis)Type II DM, poorly controlled, using insulin, blood dyscrasia: anemia:., malignancy/cancer (right breast CA 2008). Abdominal:             Vascular: negative vascular ROS.          Other Findings: Right arm lymphedema Anesthesia Plan      MAC     ASA 4       Induction: intravenous. Anesthetic plan and risks discussed with patient. Use of blood products discussed with whom did not consent to blood products. Special considerations: Sabianist. Plan discussed with CRNA and attending. Attending anesthesiologist reviewed and agrees with Bandar Beckford MD   4/14/2022      Patient seen and examined, chart reviewed, agree with above findings. Anesthetic plan, risks, benefits, alternatives, and personnel involved discussed with patient. Patient verbalized an understanding and agreed to proceed. NPO status confirmed. Anesthetic plan discussed with care team members and agreed upon.     Zaid Yun MD   4/14/2022  1:17 PM

## 2022-04-14 NOTE — PLAN OF CARE
Problem: Pain:  Goal: Pain level will decrease  Description: Pain level will decrease  Outcome: Met This Shift  Goal: Control of acute pain  Description: Control of acute pain  Outcome: Met This Shift     Problem: Falls - Risk of:  Goal: Will remain free from falls  Description: Will remain free from falls  Outcome: Met This Shift  Goal: Absence of physical injury  Description: Absence of physical injury  Outcome: Met This Shift     Problem: Mobility - Impaired:  Goal: Mobility will improve  Description: Mobility will improve  Outcome: Met This Shift

## 2022-04-14 NOTE — PROGRESS NOTES
Anesthesia cancelled Bone Biopsy. I offered to do under Local anesthesia if anesthesia did not feel comfortable giving LMAC. The request was denied by anesthesia as well as chair of surgery dept. Explained to the patient & Dr Sabrina Rodrigues.

## 2022-04-14 NOTE — PROGRESS NOTES
Hospitalist Progress Note      SYNOPSIS: Patient admitted on 2022 for Unable to ambulate has a past medical history that includes spinal stenosis, ESRD on dialysis, chronic anemia, hypertension, hyperlipidemia, diabetes mellitus, gout, coronary artery disease, mitral valve papillary fibroblastoma.     Claudia presented to the ER with back pain and issues with ambulation. She states she was unable to get out of bed to go to dialysis today. The patient has significant heart disease including valvular disease that anesthesiology has stated as a barrier to her doing surgery on her back.  Unfortunately, until she is able to ambulate effectively for post surgical rehab, the patient cannot have corrective surgery on her heart either.  Due to this difficult position, the patient was treated about a month ago with a nerve block of her back which was effective for about a month and she was able to do physical therapy. She was recently discharged from SNF on 4/3. However today pain caused patient to be unable to go to dialysis today. She denies any bowel or bladder incontinence.     CT of the lumbar spine showed new loss of height in L1 inferior endplate with increased soft tissue inflammation. Findings suspicious for possible osteomyelitis.     Patient has spinal cord stimulator so is unable to have MRI      SUBJECTIVE:  Stable overnight. No other overnight issues reported. Patient seen and examined  Records reviewed.    Awaiting bone scan, needle biopsy      Temp (24hrs), Av.2 °F (36.8 °C), Min:98 °F (36.7 °C), Max:98.4 °F (36.9 °C)    DIET: Diet NPO  CODE: Full Code    Intake/Output Summary (Last 24 hours) at 2022 0946  Last data filed at 2022 0528  Gross per 24 hour   Intake 50 ml   Output 1454 ml   Net -1404 ml       Review of Systems  All bolded are positive; please see HPI  General:  Fever, chills, diaphoresis, fatigue, malaise, night sweats, weight loss  Psychological:  Anxiety, disorientation, hallucinations. ENT:  Epistaxis, headaches, vertigo, visual changes. Cardiovascular:  Chest pain, irregular heartbeats, palpitations, paroxysmal nocturnal dyspnea. Respiratory:  Shortness of breath, coughing, sputum production, hemoptysis, wheezing, orthopnea. Gastrointestinal:  Nausea, vomiting, diarrhea, heartburn, constipation, abdominal pain, hematemesis, hematochezia, melena, acholic stools  Genito-Urinary:  Dysuria, urgency, frequency, hematuria  Musculoskeletal:  Joint pain, joint stiffness, joint swelling, muscle pain back pain  Neurology:  Headache, focal neurological deficits, weakness, numbness, paresthesia  Derm:  Rashes, ulcers, excoriations, bruising  Extremities:  Decreased ROM, peripheral edema, mottling      OBJECTIVE:    BP (!) 112/56   Pulse 66   Temp 98.2 °F (36.8 °C) (Oral)   Resp 18   Ht 5' 10\" (1.778 m)   Wt 172 lb (78 kg)   SpO2 96%   BMI 24.68 kg/m²     General appearance:  awake, alert, and oriented to person, place, time, and purpose; appears stated age and cooperative; no apparent distress no labored breathing  HEENT:  Conjunctivae/corneas clear. Neck: Supple. No jugular venous distention. Respiratory: symmetrical; clear to auscultation bilaterally; no wheezes; no rhonchi; no rales  Cardiovascular: rhythm regular; rate controlled; no murmurs  Abdomen: Soft, nontender, nondistended  Extremities:  peripheral pulses present; no peripheral edema; no ulcers  Musculoskeletal: No clubbing, cyanosis, no bilateral lower extremity edema. Brisk capillary refill. Skin:  No rashes  on visible skin  Neurologic: awake, alert and following commands     ASSESSMENT and PLAN:  · Back pain with spinal stenosis and possible osteomyelitis of lumbar spine -Neurosurgery consultation. Pain control. ID consulted. Patient was unable to get MRI due to stimulator.  Bone scan and then possible biopsy  · CAD- with h/o LAD and RCA stents 5/2021 and with cath 12/21 showing patent LAD stent, high grade focal in stent restenosis of the RCA and known  of OM2. Was supposed to have CABG x 2 with resection of the MV fibroelastoma and possible mitral valve repair, however developed osteo of finger so this was delayed. Cannot be percutaneously revascularized prior to back surgery since she will need to be on DAPT if that is done and which will need to be stopped prior to spinal surgery. Resume aspirin and Brilinta ASAP after surgery per neurosurgeries discretion. Will need to follow with CTS OP  · Mitral valve Papillary fibroelastoma   · End-stage renal disease on hemodialysis- Monday Wednesday Friday, consult nephrology for renal replacement therapy. · Chronic anemia secondary to end-stage renal disease: Monitor H&H  · Essential hypertension: Continue outpatient medications with hydralazine and metoprolol.   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  · Jehovah Witness           Medications:  REVIEWED DAILY    Infusion Medications    dextrose       Scheduled Medications    atorvastatin  80 mg Oral Nightly    allopurinol  100 mg Oral Daily    b complex-C-folic acid  1 mg Oral Nightly    bumetanide  2 mg Oral Once per day on Sun Tue Thu    gabapentin  200 mg Oral TID    hydrALAZINE  10 mg Oral BID    insulin glargine-yfgn  5 Units SubCUTAneous Nightly    isosorbide mononitrate  30 mg Oral Daily    latanoprost  1 drop Right Eye Nightly    pantoprazole  40 mg Oral QAM AC    metoprolol succinate  25 mg Oral Daily    [Held by provider] ticagrelor  90 mg Oral BID    lanthanum  1,000 mg Oral TID WC    brimonidine  1 drop Right Eye BID    And    timolol  1 drop Right Eye BID     PRN Meds: lidocaine-prilocaine, midodrine, acetaminophen, melatonin, polyethylene glycol, glucose, dextrose, glucagon (rDNA), dextrose, ondansetron, oxyCODONE-acetaminophen    Labs:     Recent Labs     04/13/22  0717   WBC 7.9   HGB 9.5*   HCT 32.6*          Recent Labs     04/13/22  0717      K 4.6      CO2 29   BUN 32*   CREATININE 5.2*   CALCIUM 8.8       Recent Labs     04/13/22  0717   PROT 6.5   ALKPHOS 109*   ALT 22   AST 22   BILITOT 0.3       No results for input(s): INR in the last 72 hours. No results for input(s): Jeannette Perez in the last 72 hours. Chronic labs:    Lab Results   Component Value Date    CHOL 101 05/19/2021    TRIG 106 05/19/2021    HDL 38 05/19/2021    LDLCALC 42 05/19/2021    TSH 2.810 05/19/2021    INR 1.0 01/20/2022    LABA1C 5.7 09/07/2021       Radiology: REVIEWED DAILY    +++++++++++++++++++++++++++++++++++++++++++++++++  DO Nick Watts Physician - [address],   +++++++++++++++++++++++++++++++++++++++++++++++++  NOTE: This report was transcribed using voice recognition software. Every effort was made to ensure accuracy; however, inadvertent computerized transcription errors may be present.

## 2022-04-14 NOTE — PROGRESS NOTES
Received call from surgery   Bone bx was cancelled due to elevated troponin and anesthesia not feeling comfortable doing the procedure.

## 2022-04-14 NOTE — CARE COORDINATION
Spoke with patient. She admits to being unable to ambulate prior to admission. We discussed possible return to [facility] when released. She is agreeable to this if she needs hands on assistance with PT. She would prefer to return to [facility] if possible. She does have a transport chair at home and does not feel she will need a wheelchair when released. Therapy ordered today. For bone bx to rule out osteomyelitis. Waiting for antibiotics until results of bx. Nacho was active with homecare prior to admission, if able to discharge home will need resume orders prior to discharge. For questions I can be reached at [phone]. [facility]    The Plan for Transition of Care is related to the following treatment goals: discharge planning when stable     The Patient and/or patient representative Ermiasstephanie Misty was provided with a choice of provider and agrees   with the discharge plan. [x] Yes [] No    Freedom of choice list was provided with basic dialogue that supports the patient's individualized plan of care/goals, treatment preferences and shares the quality data associated with the providers.  [x] Yes [] No

## 2022-04-14 NOTE — PROGRESS NOTES
Dr. Leydi Barrios notified of new cardiology consult for clearance for procedure.   Cardiology will see in AM  Patient NPO after midnight in case she gets cleared

## 2022-04-15 LAB
ALBUMIN SERPL-MCNC: 2.8 G/DL (ref 3.5–5.2)
ALBUMIN SERPL-MCNC: 2.9 G/DL (ref 3.5–5.2)
ALP BLD-CCNC: 103 U/L (ref 35–104)
ALP BLD-CCNC: 104 U/L (ref 35–104)
ALT SERPL-CCNC: 31 U/L (ref 0–32)
ALT SERPL-CCNC: 31 U/L (ref 0–32)
ANION GAP SERPL CALCULATED.3IONS-SCNC: 13 MMOL/L (ref 7–16)
ANION GAP SERPL CALCULATED.3IONS-SCNC: 15 MMOL/L (ref 7–16)
AST SERPL-CCNC: 38 U/L (ref 0–31)
AST SERPL-CCNC: 39 U/L (ref 0–31)
BASOPHILS ABSOLUTE: 0.04 E9/L (ref 0–0.2)
BASOPHILS RELATIVE PERCENT: 0.7 % (ref 0–2)
BILIRUB SERPL-MCNC: 0.4 MG/DL (ref 0–1.2)
BILIRUB SERPL-MCNC: 0.4 MG/DL (ref 0–1.2)
BUN BLDV-MCNC: 29 MG/DL (ref 6–23)
BUN BLDV-MCNC: 30 MG/DL (ref 6–23)
CALCIUM SERPL-MCNC: 8.9 MG/DL (ref 8.6–10.2)
CALCIUM SERPL-MCNC: 8.9 MG/DL (ref 8.6–10.2)
CHLORIDE BLD-SCNC: 103 MMOL/L (ref 98–107)
CHLORIDE BLD-SCNC: 104 MMOL/L (ref 98–107)
CHOLESTEROL, TOTAL: 100 MG/DL (ref 0–199)
CO2: 24 MMOL/L (ref 22–29)
CO2: 25 MMOL/L (ref 22–29)
CREAT SERPL-MCNC: 5.1 MG/DL (ref 0.5–1)
CREAT SERPL-MCNC: 5.2 MG/DL (ref 0.5–1)
EOSINOPHILS ABSOLUTE: 0.18 E9/L (ref 0.05–0.5)
EOSINOPHILS RELATIVE PERCENT: 3.3 % (ref 0–6)
GFR AFRICAN AMERICAN: 10
GFR AFRICAN AMERICAN: 10
GFR NON-AFRICAN AMERICAN: 10 ML/MIN/1.73
GFR NON-AFRICAN AMERICAN: 10 ML/MIN/1.73
GLUCOSE BLD-MCNC: 56 MG/DL (ref 74–99)
GLUCOSE BLD-MCNC: 56 MG/DL (ref 74–99)
HBA1C MFR BLD: 5.5 % (ref 4–5.6)
HCT VFR BLD CALC: 31.7 % (ref 34–48)
HCT VFR BLD CALC: 31.9 % (ref 34–48)
HDLC SERPL-MCNC: 27 MG/DL
HEMOGLOBIN: 9.3 G/DL (ref 11.5–15.5)
HEMOGLOBIN: 9.6 G/DL (ref 11.5–15.5)
IMMATURE GRANULOCYTES #: 0.04 E9/L
IMMATURE GRANULOCYTES %: 0.7 % (ref 0–5)
LDL CHOLESTEROL CALCULATED: 52 MG/DL (ref 0–99)
LYMPHOCYTES ABSOLUTE: 1.47 E9/L (ref 1.5–4)
LYMPHOCYTES RELATIVE PERCENT: 26.8 % (ref 20–42)
MAGNESIUM: 2.4 MG/DL (ref 1.6–2.6)
MCH RBC QN AUTO: 27.1 PG (ref 26–35)
MCH RBC QN AUTO: 27.8 PG (ref 26–35)
MCHC RBC AUTO-ENTMCNC: 29.3 % (ref 32–34.5)
MCHC RBC AUTO-ENTMCNC: 30.1 % (ref 32–34.5)
MCV RBC AUTO: 92.4 FL (ref 80–99.9)
MCV RBC AUTO: 92.5 FL (ref 80–99.9)
METER GLUCOSE: 124 MG/DL (ref 74–99)
MONOCYTES ABSOLUTE: 0.91 E9/L (ref 0.1–0.95)
MONOCYTES RELATIVE PERCENT: 16.6 % (ref 2–12)
NEUTROPHILS ABSOLUTE: 2.84 E9/L (ref 1.8–7.3)
NEUTROPHILS RELATIVE PERCENT: 51.9 % (ref 43–80)
PDW BLD-RTO: 17.3 FL (ref 11.5–15)
PDW BLD-RTO: 17.4 FL (ref 11.5–15)
PHOSPHORUS: 4.1 MG/DL (ref 2.5–4.5)
PLATELET # BLD: 351 E9/L (ref 130–450)
PLATELET # BLD: 360 E9/L (ref 130–450)
PMV BLD AUTO: 9.7 FL (ref 7–12)
PMV BLD AUTO: 9.8 FL (ref 7–12)
POTASSIUM SERPL-SCNC: 4.4 MMOL/L (ref 3.5–5)
POTASSIUM SERPL-SCNC: 4.7 MMOL/L (ref 3.5–5)
RBC # BLD: 3.43 E12/L (ref 3.5–5.5)
RBC # BLD: 3.45 E12/L (ref 3.5–5.5)
SODIUM BLD-SCNC: 142 MMOL/L (ref 132–146)
SODIUM BLD-SCNC: 142 MMOL/L (ref 132–146)
TOTAL PROTEIN: 6 G/DL (ref 6.4–8.3)
TOTAL PROTEIN: 6.2 G/DL (ref 6.4–8.3)
TRIGL SERPL-MCNC: 107 MG/DL (ref 0–149)
TSH SERPL DL<=0.05 MIU/L-ACNC: 1.5 UIU/ML (ref 0.27–4.2)
VLDLC SERPL CALC-MCNC: 21 MG/DL
WBC # BLD: 5.5 E9/L (ref 4.5–11.5)
WBC # BLD: 5.6 E9/L (ref 4.5–11.5)

## 2022-04-15 PROCEDURE — 36415 COLL VENOUS BLD VENIPUNCTURE: CPT

## 2022-04-15 PROCEDURE — 99222 1ST HOSP IP/OBS MODERATE 55: CPT | Performed by: INTERNAL MEDICINE

## 2022-04-15 PROCEDURE — 90935 HEMODIALYSIS ONE EVALUATION: CPT | Performed by: INTERNAL MEDICINE

## 2022-04-15 PROCEDURE — 84100 ASSAY OF PHOSPHORUS: CPT

## 2022-04-15 PROCEDURE — 84443 ASSAY THYROID STIM HORMONE: CPT

## 2022-04-15 PROCEDURE — 80053 COMPREHEN METABOLIC PANEL: CPT

## 2022-04-15 PROCEDURE — 80061 LIPID PANEL: CPT

## 2022-04-15 PROCEDURE — 82962 GLUCOSE BLOOD TEST: CPT

## 2022-04-15 PROCEDURE — 6370000000 HC RX 637 (ALT 250 FOR IP): Performed by: INTERNAL MEDICINE

## 2022-04-15 PROCEDURE — S5553 INSULIN LONG ACTING 5 U: HCPCS | Performed by: INTERNAL MEDICINE

## 2022-04-15 PROCEDURE — 2580000003 HC RX 258: Performed by: ANESTHESIOLOGY

## 2022-04-15 PROCEDURE — 85025 COMPLETE CBC W/AUTO DIFF WBC: CPT

## 2022-04-15 PROCEDURE — 83735 ASSAY OF MAGNESIUM: CPT

## 2022-04-15 PROCEDURE — 83036 HEMOGLOBIN GLYCOSYLATED A1C: CPT

## 2022-04-15 PROCEDURE — 85027 COMPLETE CBC AUTOMATED: CPT

## 2022-04-15 PROCEDURE — 2060000000 HC ICU INTERMEDIATE R&B

## 2022-04-15 PROCEDURE — 99221 1ST HOSP IP/OBS SF/LOW 40: CPT | Performed by: STUDENT IN AN ORGANIZED HEALTH CARE EDUCATION/TRAINING PROGRAM

## 2022-04-15 PROCEDURE — APPSS60 APP SPLIT SHARED TIME 46-60 MINUTES: Performed by: NURSE PRACTITIONER

## 2022-04-15 RX ADMIN — ALLOPURINOL 100 MG: 100 TABLET ORAL at 10:40

## 2022-04-15 RX ADMIN — OXYCODONE AND ACETAMINOPHEN 1 TABLET: 5; 325 TABLET ORAL at 21:00

## 2022-04-15 RX ADMIN — METOPROLOL SUCCINATE 25 MG: 25 TABLET, EXTENDED RELEASE ORAL at 10:40

## 2022-04-15 RX ADMIN — ISOSORBIDE MONONITRATE 30 MG: 30 TABLET, EXTENDED RELEASE ORAL at 10:37

## 2022-04-15 RX ADMIN — OXYCODONE AND ACETAMINOPHEN 1 TABLET: 5; 325 TABLET ORAL at 16:19

## 2022-04-15 RX ADMIN — MIDODRINE HYDROCHLORIDE 5 MG: 5 TABLET ORAL at 16:19

## 2022-04-15 RX ADMIN — HYDRALAZINE HYDROCHLORIDE 10 MG: 10 TABLET, FILM COATED ORAL at 10:35

## 2022-04-15 RX ADMIN — BRIMONIDINE TARTRATE 1 DROP: 2 SOLUTION/ DROPS OPHTHALMIC at 21:04

## 2022-04-15 RX ADMIN — GABAPENTIN 200 MG: 100 CAPSULE ORAL at 13:41

## 2022-04-15 RX ADMIN — PANTOPRAZOLE SODIUM 40 MG: 40 TABLET, DELAYED RELEASE ORAL at 05:48

## 2022-04-15 RX ADMIN — LANTHANUM CARBONATE 1000 MG: 500 TABLET, CHEWABLE ORAL at 17:30

## 2022-04-15 RX ADMIN — HYDRALAZINE HYDROCHLORIDE 10 MG: 10 TABLET, FILM COATED ORAL at 20:59

## 2022-04-15 RX ADMIN — SODIUM CHLORIDE, PRESERVATIVE FREE 10 ML: 5 INJECTION INTRAVENOUS at 21:01

## 2022-04-15 RX ADMIN — TIMOLOL MALEATE 1 DROP: 5 SOLUTION OPHTHALMIC at 10:34

## 2022-04-15 RX ADMIN — GABAPENTIN 200 MG: 100 CAPSULE ORAL at 21:00

## 2022-04-15 RX ADMIN — ATORVASTATIN CALCIUM 80 MG: 40 TABLET, FILM COATED ORAL at 20:59

## 2022-04-15 RX ADMIN — NEPHROCAP 1 MG: 1 CAP ORAL at 21:01

## 2022-04-15 RX ADMIN — OXYCODONE AND ACETAMINOPHEN 1 TABLET: 5; 325 TABLET ORAL at 05:48

## 2022-04-15 RX ADMIN — BRIMONIDINE TARTRATE 1 DROP: 2 SOLUTION/ DROPS OPHTHALMIC at 10:34

## 2022-04-15 RX ADMIN — TIMOLOL MALEATE 1 DROP: 5 SOLUTION OPHTHALMIC at 21:04

## 2022-04-15 RX ADMIN — SODIUM CHLORIDE, PRESERVATIVE FREE 10 ML: 5 INJECTION INTRAVENOUS at 10:33

## 2022-04-15 RX ADMIN — INSULIN GLARGINE-YFGN 5 UNITS: 100 INJECTION, SOLUTION SUBCUTANEOUS at 21:02

## 2022-04-15 RX ADMIN — LANTHANUM CARBONATE 1000 MG: 500 TABLET, CHEWABLE ORAL at 10:39

## 2022-04-15 RX ADMIN — LATANOPROST 1 DROP: 50 SOLUTION OPHTHALMIC at 21:04

## 2022-04-15 RX ADMIN — OXYCODONE AND ACETAMINOPHEN 1 TABLET: 5; 325 TABLET ORAL at 10:46

## 2022-04-15 ASSESSMENT — PAIN DESCRIPTION - DESCRIPTORS
DESCRIPTORS: CONSTANT;PATIENT UNABLE TO DESCRIBE
DESCRIPTORS: CONSTANT;SHOOTING
DESCRIPTORS: CONSTANT;PATIENT UNABLE TO DESCRIBE
DESCRIPTORS: CONSTANT;PATIENT UNABLE TO DESCRIBE

## 2022-04-15 ASSESSMENT — PAIN DESCRIPTION - PROGRESSION
CLINICAL_PROGRESSION: GRADUALLY WORSENING
CLINICAL_PROGRESSION: GRADUALLY WORSENING
CLINICAL_PROGRESSION: GRADUALLY IMPROVING
CLINICAL_PROGRESSION: GRADUALLY WORSENING

## 2022-04-15 ASSESSMENT — PAIN DESCRIPTION - ONSET
ONSET: ON-GOING
ONSET: GRADUAL
ONSET: ON-GOING
ONSET: ON-GOING

## 2022-04-15 ASSESSMENT — PAIN DESCRIPTION - ORIENTATION
ORIENTATION: RIGHT;LEFT

## 2022-04-15 ASSESSMENT — PAIN SCALES - GENERAL
PAINLEVEL_OUTOF10: 8
PAINLEVEL_OUTOF10: 6
PAINLEVEL_OUTOF10: 5
PAINLEVEL_OUTOF10: 0
PAINLEVEL_OUTOF10: 7
PAINLEVEL_OUTOF10: 8
PAINLEVEL_OUTOF10: 6
PAINLEVEL_OUTOF10: 0
PAINLEVEL_OUTOF10: 7
PAINLEVEL_OUTOF10: 8

## 2022-04-15 ASSESSMENT — PAIN DESCRIPTION - PAIN TYPE
TYPE: CHRONIC PAIN
TYPE: CHRONIC PAIN;ACUTE PAIN

## 2022-04-15 ASSESSMENT — PAIN DESCRIPTION - LOCATION
LOCATION: BACK;LEG

## 2022-04-15 ASSESSMENT — ENCOUNTER SYMPTOMS
RESPIRATORY NEGATIVE: 1
EYES NEGATIVE: 1
GASTROINTESTINAL NEGATIVE: 1
BACK PAIN: 1

## 2022-04-15 ASSESSMENT — PAIN DESCRIPTION - FREQUENCY
FREQUENCY: CONTINUOUS

## 2022-04-15 NOTE — DISCHARGE INSTR - COC
Continuity of Care Form    Patient Name: Darryle Slain   :  1955  MRN:  [id number]    Admit date:  2022  Discharge date:  22    Code Status Order: Full Code   Advance Directives:      Admitting Physician:  Michel Hare DO  PCP: Raina Pinzon MD    Discharging Nurse: [facility] Unit/Room#: 0064/0516-S  [id number] Unit Phone Number: [phone]    Emergency Contact:   Extended Emergency Contact Information  Primary Emergency Contact: Louann Jiang, [address] [place] [address] [place]  Phone: [phone]  Relation: Other  Secondary Emergency Contact: Kathy Dunlap  Mobile Phone: [phone]  Relation: Brother/Sister    Past Surgical History:  Past Surgical History:   Procedure Laterality Date    AMPUTATION      right great toe    ANKLE SURGERY      correction on charcot joint of right ankle    CARDIAC CATHETERIZATION  2021    Dr Cuellar Ards Left 3/17/2020    [facility] performed by Nataliia Angela MD at [address]      bilateral    CHOLECYSTECTOMY      COLONOSCOPY      CYSTOSCOPY      DIALYSIS FISTULA CREATION Left 2018    upper arm/Dr. Jesus Alberto Gomez    ECHO COMPL W DOP COLOR FLOW  2013         ECHO COMPLETE  2013         FINGER AMPUTATION Left 2022    AMPUTATION OF LEFT THIRD DIGIT, DISTAL PHALANX performed by Kang Cruz MD at [facility]      right    OTHER SURGICAL HISTORY  2011    PPV, membranectomy, laser Right eye    OTHER SURGICAL HISTORY  insertion lumbar drain insertion    10/12/`14    OTHER SURGICAL HISTORY  10/22/15    percutaneous lead placement for spinal cord stimulator    OTHER SURGICAL HISTORY  2015    Spinal; cord stimulator- turned off as of 3-10-20     VA AV ANAST,UP ARM BASILIC VEIN TRANSPOSIT Left 5/15/2018    TRANSPOSITION STAGE II AV FISTULA - LEFT UPPER ARM performed by Felicia Pete MD at [address] [place] [address], [address] ANGIOACCESS AV FISTULA Left 9/25/2018    SUPERFICIALIZATION AV FISTULA - LEFT ARM performed by Aminah Rivas MD at [facility] [address] MOORE RETINAL [place] W/VITRECTOMY ANY METH Left 4/10/2018    PARS PLANA VITRECTOMY 22 GAUGE RETINAL DETACHMENT REPAIR air fluid exchange, endolaser performed by Sean Rubinstein, MD at [address]      L2    TRANSESOPHAGEAL ECHOCARDIOGRAM  11/11/2021    Dr. Chuyita Che    TUNNELED VENOUS CATHETER PLACEMENT  11/15/2017    VITRECTOMY Left 04/10/2018    PARS PLANA VITRECTOMY; RETINAL DETACHMENT REPAIR; GAS BUBBLE; LASER LEFT EYE       Immunization History:   Immunization History   Administered Date(s) Administered    COVID-19, Aliyah Winchester, Primary or Immunocompromised, PF, 100mcg/0.5mL 03/11/2021, 04/08/2021    Pneumococcal Polysaccharide ([id number]) 12/04/2018       Active Problems:  Patient Active Problem List   Diagnosis Code    Diabetic retinopathy ([facility] [address] E11.319    Malignant neoplasm of right female breast ([address] C50.911    Atherosclerosis of native coronary artery of native heart without angina pectoris I25.10    Moderate obesity E66.8    Left ventricular hypertrophy I51.7    Herniated lumbar intervertebral disc M51.26    Lumbar degenerative disc disease M51.36    Pseudomeningocele G96.198    Lumbar radiculopathy M54.16    Lymphedema of arm I89.0    CKD (chronic kidney disease) stage 4, GFR 15-29 ml/min ([facility]) N18.4    Insulin dependent type 2 diabetes mellitus ([facility]) E11.9, Z79.4    Anemia of chronic disease D63.8    Chronic diastolic CHF (congestive heart failure) ([facility]) I50.32    Neuropathy G62.9    Hypertension I10    Glaucoma H40.9    Refusal of blood product Z78.9    Pancytopenia ([address] D61.818    Controlled type 2 diabetes mellitus with chronic kidney disease on chronic dialysis, with long-term current use of insulin ([facility]) E11.22, N18.6, Z79.4, Z99.2    Vitreous hemorrhage ([address] H43.10    Patient is Hinduism Z78.9    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 ([facility]) E11.649    Hyperkalemia, diminished renal excretion E87.5    Chronic pain syndrome G89.4    Lumbar post-laminectomy syndrome M96.1    Myalgia M79.10    Cervicalgia M54.2    Diabetic peripheral neuropathy ([facility]) E11.42    ESRD (end stage renal disease) ([facility]) N18.6    Bilateral carpal tunnel syndrome G56.03    Spinal stenosis of lumbar region with neurogenic claudication M48.062    Cardiac arrest ([facility]) I46.9    ESRD (end stage renal disease) on dialysis ([facility]) N18.6, Z99.2    Mixed hyperlipidemia E78.2    Lymphedema of right upper extremity I89.0    Coronary artery disease involving native coronary artery of native heart with angina pectoris ([facility]) I25.119    Ventricular tachycardia ([facility]) I47.2    Mitral valve disease I05.9    CAD in native artery I25.10    Lumbar stenosis without neurogenic claudication M48.061    Lumbar foraminal stenosis M48.061    Back pain M54.9    Intractable low back pain M54.59    Unable to ambulate R26.2       Isolation/Infection:   Isolation            No Isolation          Patient Infection Status       Infection Onset Added Last Indicated Last Indicated By Review Planned Expiration Resolved Resolved By    None active    Resolved    COVID-19 (Rule Out) 05/19/21 05/19/21 05/19/21 Respiratory Panel, Molecular, with COVID-19 (Restricted: peds pts or suitable admitted adults) (Ordered)   05/19/21 Rule-Out Test Resulted    COVID-19 (Rule Out) 09/17/20 09/17/20 09/17/20 Covid-19 Ambulatory (Ordered)   09/18/20 Rule-Out Test Resulted            Nurse Assessment:  Last Vital Signs: BP (!) 106/41   Pulse 58   Temp 98.2 °F (36.8 °C) (Oral)   Resp 18   Ht 5' 10\" (1.778 m)   Wt 167 lb 8.8 oz (76 kg)   SpO2 97%   BMI 24.04 kg/m²     Last documented pain score (0-10 scale): Pain Level: 8  Last Weight:   Wt Readings from Last 1 Encounters:   04/15/22 167 lb 8.8 oz (76 kg)     Mental Status:  oriented and alert    IV Access:  - Dialysis Catheter  - site  left, insertion date: Nursing Mobility/ADLs:  Walking   Assisted  Transfer  Assisted  Bathing  Assisted  Dressing  Assisted  Toileting  Assisted  Feeding  Independent  Med Admin  Independent  Med Delivery   whole    Wound Care Documentation and Therapy:        Elimination:  Continence: Bowel: Yes  Bladder: Yes  Urinary Catheter: None   Colostomy/Ileostomy/Ileal Conduit: No       Date of Last BM: 4/22/22    Intake/Output Summary (Last 24 hours) at 4/15/2022 1038  Last data filed at 4/15/2022 1033  Gross per 24 hour   Intake 60 ml   Output 198 ml   Net -138 ml     No intake/output data recorded. Safety Concerns: At Risk for Falls    Impairments/Disabilities:      Right masectomy    Nutrition Therapy:  Current Nutrition Therapy:   - Oral Diet:  Low Sodium (3-4gm)    Routes of Feeding: Oral  Liquids: No Restrictions  Daily Fluid Restriction: no  Last Modified Barium Swallow with Video (Video Swallowing Test): not done    Treatments at the Time of Hospital Discharge:   Respiratory Treatments:   Oxygen Therapy:  is not on home oxygen therapy. Ventilator:    - No ventilator support    Rehab Therapies: Physical Therapy and Occupational Therapy  Weight Bearing Status/Restrictions: No weight bearing restrictions  Other Medical Equipment (for information only, NOT a DME order):     Other Treatments: ***    Patient's personal belongings (please select all that are sent with patient):       RN SIGNATURE:  Electronically signed by Denton Cabot, RN on 4/22/22 at 4:04 PM EDT    CASE MANAGEMENT/SOCIAL WORK SECTION    Inpatient Status Date: 4/13/22    Readmission Risk Assessment Score:  Readmission Risk              Risk of Unplanned Readmission:  33           Discharging to Facility/ Agency   Name: [facility]  Address: [address] [place] [address]  Fax:    Dialysis Facility (if applicable)   Name:  Address:  Dialysis Schedule:  Phone:  Fax:    / signature: {Esignature:[id number]}    PHYSICIAN SECTION    Prognosis: {Prognosis:6467643318}    Condition at Discharge: [address] Patient Condition:[id number]}    Rehab Potential (if transferring to Rehab): {Prognosis:8762857721}    Recommended Labs or Other Treatments After Discharge: ***    Physician Certification: I certify the above information and transfer of Milton Rodriguez  is necessary for the continuing treatment of the diagnosis listed and that she requires {Admit to Appropriate Level of Care:04411} for {GREATER/LESS:968708952} 30 days.      Update Admission H&P: {CHP DME Changes in RRQSY:275843200}    PHYSICIAN SIGNATURE:  {Esignature:[id number]}

## 2022-04-15 NOTE — PROGRESS NOTES
CTS consulted. Electronically signed by Lisette Mary RN on 4/15/2022 at 12:28 PM    Cardiology NP notified that CTS consult was placed and they responded, \"Per Dr Mk Love, who the patient is known to, we do not provide clearance. In her last note, she stated this is non-emergent\". Message read, no new orders given.  Electronically signed by Lisette Mary RN on 4/15/2022 at 12:57 PM

## 2022-04-15 NOTE — CARE COORDINATION
Care Coordination:  Met with patient at bedside to discuss discharge planning. Bone biopsy pending cadrio clearance. Neurosurgery following. Patient in a great deal of pain today. She does not feel he will be strong enough to return home. She would like to be considered for admission to [facility] pending medical stability. She has been there in the past. Will need PT OT evaluations . Order placed today. Referral called to [place] at [facility]. She will follow. HENS  envelop started. . Will need pre cert and transport documentation . Will follow course and treatment plan for anticipated discharge to [facility]. The Plan for Transition of Care is related to the following treatment goals: [facility]  The Patient was provided with a choice of provider and agrees   with the discharge plan. [x] Yes [] No    Freedom of choice list was provided with basic dialogue that supports the patient's individualized plan of care/goals, treatment preferences and shares the quality data associated with the providers.  [x] Yes [] No

## 2022-04-15 NOTE — CONSULTS
CTS Consult    Patient name: Lisset Rollins    Reason for consult: CTS surgery clearance prior to bone biopsy under general anesthesia / nonurgent CABG, Mass removal and possible MVrp scheduled in the future    Referring Physician: Patel Vail    Primary Care Physician: Austyn Lucas MD    Date of service: 4/15/2022    Chief Complaint: unable to ambulate, weakness    HPI:  Nikunj Aiken a 73 y. o. female well known to me with complex medical history who was previously scheduled for surgery on 12/29/21 for CABG x 2 (SVG-RCA, SVG-LCx/OM) and excision on MV mass, poss MV repair. Prior to OR, she was in ED on 12/20 for finger abscess with I&D, she was placed on abx. Cardiac surgery was postponed to complete course of abx and re-evaluate finger. Eventually, she underwent amputation of the distal left third phalanx. She was scheduled to see me in office 3/15 for re-evaluation and scheduling for OR. However, she fell on 3/10 with resulting back pain. She was transferred from [place] [id number] to [facility] for evaluation. Neurosurgery was planned but cancelled secondary to her heart disease. She was eventually discharged to a SNF. She was discharged home from [facility] 4/3. She now represents 4/13/2022 from home with increasing weakness, back pain and inability to ambulate. CT lumbar spine concerning for osteomyelitis. Neurosurgery recommending bone biopsy. Regarding her cardiac history, she was initially sent to my office by cardiology for evaluation of her fibroelastoma. Otherwise, PMH is significant for CAD (cath 5/2021 95% mid LAD,  circ, 90% mid RCA, 70% ostial PDA----> s/p MARYLOU mid LAD and mid RCA) and has been followed by cardiology.  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   LVEF of 50-55%. KIARA showed Structurally normal anterior mitral leaflet with echodensity attached tot the ventricular side of the posterior leaflet(1.0x 1.2cm) with restriction excursion, mild MR, mild TR. Repeat cardiac catheterization demonstrated progression of her coronary artery disease with 80% instent stenosis of RCA, occluded LCx. She is currently maintained on brilinta.      Of note she also has history of right breast CA, treated with mastectomy and chemo/rads, CKD on HD MWF via LUE AVF, CHF, HTN, DM, chronic anemia; she is jehovahs witness and does not accept any blood products. Allergies: Allergies   Allergen Reactions    Furosemide Swelling and Other (See Comments)     Patient states \"I swelled up like a pig. \" states her kidneys shut down         Home medications:    Current Facility-Administered Medications   Medication Dose Route Frequency Provider Last Rate Last Admin    sodium chloride flush 0.9 % injection 5-40 mL  5-40 mL IntraVENous 2 times per day Anthony Mayes MD   10 mL at 04/15/22 1033    sodium chloride flush 0.9 % injection 5-40 mL  5-40 mL IntraVENous PRN Anthony Mayes MD        0.9 % sodium chloride infusion   IntraVENous PRN Anthony Mayes MD        fentaNYL (SUBLIMAZE) injection 25 mcg  25 mcg IntraVENous Q1H PRN Oleta Jeanette, DO   25 mcg at 04/14/22 1616    atorvastatin (LIPITOR) tablet 80 mg  80 mg Oral Nightly [facility] Madi DO   80 mg at 04/14/22 2055    allopurinol (ZYLOPRIM) tablet 100 mg  100 mg Oral Daily [facility] Madi DO   100 mg at 04/15/22 1040    b complex-C-folic acid (NEPHROCAPS) capsule 1 mg  1 mg Oral Nightly [facility] Madi DO   1 mg at 04/14/22 2055    bumetanide (BUMEX) tablet 2 mg  2 mg Oral Once per day on Sun Tue Thu [facility] Madi DO   2 mg at 04/14/22 0848    gabapentin (NEURONTIN) capsule 200 mg  200 mg Oral TID [facility], DO   200 mg at 04/15/22 1035    hydrALAZINE (APRESOLINE) tablet 10 mg  10 mg Oral BID [facility] Madi DO   10 mg at 04/15/22 1035    insulin glargine-yfgn (SEMGLEE-YFGN) injection vial 5 Units  5 Units SubCUTAneous Nightly Samy Mcdonnell, DO   5 Units at 04/14/22 2055    isosorbide mononitrate (IMDUR) extended release tablet 30 mg  30 mg Oral Daily [facility] TUAN Barraza, DO   30 mg at 04/15/22 1037    lidocaine-prilocaine (EMLA) cream   Topical PRN Rodneyjose Mcdonnell, DO        latanoprost (XALATAN) 0.005 % ophthalmic solution 1 drop  1 drop Right Eye Nightly Samy Mcdonnell DO   1 drop at 04/14/22 2055    midodrine (PROAMATINE) tablet 5 mg  5 mg Oral Daily PRN Rodneyjose Mcdonnell DO        pantoprazole (PROTONIX) tablet 40 mg  40 mg Oral QAM AC Lusi Barraza, DO   40 mg at 04/15/22 0548    metoprolol succinate (TOPROL XL) extended release tablet 25 mg  [facility]   25 mg at 04/15/22 1040    [Held by provider] ticagrelor (BRILINTA) tablet 90 mg  90 mg Oral BID [facility] TUAN Barraza DO        acetaminophen (TYLENOL) tablet 650 mg  650 mg Oral Q4H PRN [facility] TUAN Barraza DO        melatonin tablet 3 mg  3 mg Oral Nightly PRN [facility] Madi DO        polyethylene glycol (GLYCOLAX) packet 17 g  17 g Oral Daily PRN [facility] TUAN Barraza DO        glucose (GLUTOSE) 40 % oral gel 15 g  15 g Oral PRN [facility] Madi DO        dextrose 50 % IV solution  12.5 g IntraVENous PRN Rodneyjose Mcdonnell DO   12.5 g at 04/14/22 0853    glucagon (rDNA) injection 1 mg  1 mg IntraMUSCular PRN [facility] TUAN Barraza DO        dextrose 5 % solution  100 mL/hr IntraVENous PRN [facility] TUAN Barraza DO        lanthanum [facility] AZLE) chewable tablet 1,000 mg  1,000 mg Oral TID WC Luis Barraza DO   1,000 mg at 04/15/22 1039    ondansetron (ZOFRAN) injection 4 mg  4 mg IntraVENous Q6H PRN [facility] TUAN Barraza, DO        brimonidine (ALPHAGAN) 0.2 % ophthalmic solution 1 drop  1 drop Right Eye BID Luis Barraza DO   1 drop at 04/15/22 1034    And    timolol (TIMOPTIC) 0.5 % ophthalmic solution 1 drop  1 drop Right Eye BID Samy Mcdonnell, DO   1 drop at 04/15/22 1034    oxyCODONE-acetaminophen (PERCOCET) 5-325 MG per tablet 1 tablet  1 tablet Oral Q4H PRN Rosemary Whitfield, DO   1 tablet at 04/15/22 1046       Past Medical History:  Past Medical History:   Diagnosis Date    Acute infection of bone (HCC)     infection of rt foot, resolved.     Acute osteomyelitis of phalanx of left hand ([address] 1/27/2022    Left third distal phalanx    Anemia of chronic disease     Arthritis     Breast cancer ([address]     right breast, 2008/ bladder, 2006- last chemotherapy \"years ago\"    CAD (coronary artery disease)     Carpal tunnel syndrome     bilat - for OR left hand 3-17-20     Chronic diastolic CHF (congestive heart failure) ([address] 09/23/2014 9/23/14- echocardiogram revealed moderate LV concentric hypertrophy, stage III diastolic dysfunction, mild tricuspid regurgitation    CKD (chronic kidney disease) stage 4, GFR 15-29 ml/min ([facility])     Diabetic retinopathy ([address]     Glaucoma     Hemodialysis patient ([address]     [facility]- Dr. Noe Omalley - left arm fistula     Hyperkalemia, diminished renal excretion 11/9/2017    Hyperlipidemia     Hypertension     Hypoglycemia unawareness in type 1 diabetes mellitus ([address] 11/7/2017    Insulin dependent type 2 diabetes mellitus ([address]     Neuropathy     feet    Osteomyelitis due to secondary diabetes ([address]     rt great toe with amputation    Patient is Restorationist 11/7/2017    Refusal of blood product     patient states she dose not take blood transfusion    Ventricular hypertrophy     Vitreous hemorrhage ([address]     left eye       Past Surgical History:  Past Surgical History:   Procedure Laterality Date    AMPUTATION      right great toe    ANKLE SURGERY      correction on charcot joint of right ankle    CARDIAC CATHETERIZATION  12/09/2021    Dr Philip Grant Left 3/17/2020    LEFT CARPAL TUNNEL RELEASE performed by Irving García MD at [address]      bilateral    CHOLECYSTECTOMY      COLONOSCOPY      CYSTOSCOPY  DIALYSIS FISTULA CREATION Left 01/31/2018    upper arm/Dr. Linda Manning    ECHO COMPL W DOP COLOR FLOW  2/14/2013         ECHO COMPLETE  9/17/2013         FINGER AMPUTATION Left 1/20/2022    AMPUTATION OF LEFT THIRD DIGIT, DISTAL PHALANX performed by Melissa Kamara MD at [facility]      right    OTHER SURGICAL HISTORY  9/27/2011    PPV, membranectomy, laser Right eye    OTHER SURGICAL HISTORY  insertion lumbar drain insertion    10/12/`14    OTHER SURGICAL HISTORY  10/22/15    percutaneous lead placement for spinal cord stimulator    OTHER SURGICAL HISTORY  11/03/2015    Spinal; cord stimulator- turned off as of 3-10-20     OR AV ANAST,UP ARM BASILIC VEIN TRANSPOSIT Left 5/15/2018    TRANSPOSITION STAGE II AV FISTULA - LEFT UPPER ARM performed by Skyla Teran MD at [facility] ANGIOACCESS AV FISTULA Left 9/25/2018    SUPERFICIALIZATION AV FISTULA - LEFT ARM performed by Skyla Teran MD at [facility] W/VITRECTOMY ANY METH Left 4/10/2018    PARS PLANA VITRECTOMY 25 GAUGE RETINAL DETACHMENT REPAIR air fluid exchange, endolaser performed by Lara Mendoza MD at [address] [place] [address] Drive TRANSESOPHAGEAL ECHOCARDIOGRAM  11/11/2021    Dr. Jodeen Heimlich TUNNELED VENOUS CATHETER PLACEMENT  11/15/2017    VITRECTOMY Left 04/10/2018    PARS PLANA VITRECTOMY; RETINAL DETACHMENT REPAIR; GAS BUBBLE; LASER LEFT EYE       Social History:  Social History     Socioeconomic History    Marital status: Single     Spouse name: Not on file    Number of children: Not on file    Years of education: Not on file    Highest education level: Not on file   Occupational History    Not on file   Tobacco Use    Smoking status: Never Smoker    Smokeless tobacco: Never Used   Vaping Use    Vaping Use: Never used   Substance and Sexual Activity    Alcohol use: No    Drug use: No    Sexual activity: Not Currently   Other Topics Concern    Not on file Social History Narrative    Not on file     Social Determinants of Health     Financial Resource Strain:     Difficulty of Paying Living Expenses: Not on file   Food Insecurity:     Worried About Running Out of Food in the Last Year: Not on file    Fior of Food in the Last Year: Not on file   Transportation Needs:     Lack of Transportation (Medical): Not on file    Lack of Transportation (Non-Medical): Not on file   Physical Activity:     Days of Exercise per Week: Not on file    Minutes of Exercise per Session: Not on file   Stress:     Feeling of Stress : Not on file   Social Connections:     Frequency of Communication with Friends and Family: Not on file    Frequency of Social Gatherings with Friends and Family: Not on file    Attends Judaism Services: Not on file    Active Member of Clubs or Organizations: Not on file    Attends Club or Organization Meetings: Not on file    Marital Status: Not on file   Intimate Partner Violence:     Fear of Current or Ex-Partner: Not on file    Emotionally Abused: Not on file    Physically Abused: Not on file    Sexually Abused: Not on file   Housing Stability:     Unable to Pay for Housing in the Last Year: Not on file    Number of [place] in the Last Year: Not on file    Unstable Housing in the Last Year: Not on file       Family History:  Family History   Problem Relation Age of Onset    Breast Cancer Mother 61    Hypertension Mother     Heart Disease Father     Prostate Cancer Father     Breast Cancer Maternal Grandmother 60       Review of Systems   Constitutional: Positive for appetite change and fatigue. HENT: Negative. Eyes: Negative. Respiratory: Negative. Cardiovascular: Negative. Gastrointestinal: Negative. Endocrine: Negative. Genitourinary: Negative. Musculoskeletal: Positive for back pain. Neurological: Negative. Hematological: Negative. Psychiatric/Behavioral: Negative.         Labs:  Recent Labs     04/13/22  0717 04/15/22  0456 04/15/22  0605   WBC 7.9 5.6 5.5   HGB 9.5* 9.6* 9.3*   HCT 32.6* 31.9* 31.7*    351 360      Recent Labs     04/13/22  0717 04/15/22  0456 04/15/22  0605   BUN 32* 29* 30*   CREATININE 5.2* 5.1* 5.2*       Objective:  Vitals BP (!) 106/41   Pulse 58   Temp 98.2 °F (36.8 °C) (Oral)   Resp 18   Ht 5' 10\" (1.778 m)   Wt 167 lb 8.8 oz (76 kg)   SpO2 97%   BMI 24.04 kg/m²   Physical Exam  Vitals and nursing note reviewed. Constitutional:       General: She is not in acute distress. Appearance: She is normal weight. She is ill-appearing. She is not toxic-appearing. HENT:      Head: Normocephalic and atraumatic. Nose: Nose normal. No congestion. Mouth/Throat:      Mouth: Mucous membranes are moist.      Pharynx: Oropharynx is clear. Eyes:      General: No scleral icterus. Conjunctiva/sclera: Conjunctivae normal.   Cardiovascular:      Rate and Rhythm: Normal rate and regular rhythm. Pulses: Normal pulses. Pulmonary:      Effort: Pulmonary effort is normal. No respiratory distress. Abdominal:      General: Abdomen is flat. There is no distension. Palpations: Abdomen is soft. Musculoskeletal:         General: Normal range of motion. Skin:     General: Skin is warm. Capillary Refill: Capillary refill takes less than 2 seconds. Coloration: Skin is not jaundiced. Neurological:      General: No focal deficit present. Mental Status: She is alert and oriented to person, place, and time. Mental status is at baseline. Psychiatric:         Mood and Affect: Mood normal.         Behavior: Behavior normal.         Thought Content: Thought content normal.         Judgment: Judgment normal.            Assessment:  [address] [place] [address] a 73 y. o. female with complex medical history who was previously scheduled for surgery for excision on MV mass, poss MV repair, concomitant CABG x 2 (SVG-RCA, SVG-LCx/OM) however, prior to OR, found to have L 3rd finger osteomyelitis and surgery was cancelled. She is now s/p distal phalanx amputation. While recovering, she sustained a fall and was managed non-operatively. She is readmitted with back pain, now concern for lumbar osteomyelitis. CTS was consulted for clearance for general anesthesia.       Plan:   We do not provide medical clearance therefore will defer to medical teams for clearance and management of cardiac issues (i.e. elevated troponin and BNP)  Cardiology assessment and recommendations noted, \"patient is at high risk for procedure under general anesthesia\"   As it stands, there are no imminent plans for non-emergent cardiac surgical intervention until patient is able to participate in post-operative recovery, has no evidence of infection, and is medically optimized as patient would be high risk for post-operative complications in the current setting   Discussed with patient and family at bedside, who all voiced understanding and were comfortable with the information provided    Electronically signed by Jaron Tello DO on 4/15/2022 at 1:29 PM

## 2022-04-15 NOTE — PROGRESS NOTES
Infectious Disease  Progress Note  [facility]    Chief Complaint: lumbar osteomyelitis    Subjective:  She has persistent stable back pain. No fever. Able to move LEs well. Scheduled Meds:   sodium chloride flush  5-40 mL IntraVENous 2 times per day    atorvastatin  80 mg Oral Nightly    allopurinol  100 mg Oral Daily    b complex-C-folic acid  1 mg Oral Nightly    bumetanide  2 mg Oral Once per day on Sun Tue Thu    gabapentin  200 mg Oral TID    hydrALAZINE  10 mg Oral BID    insulin glargine-yfgn  5 Units SubCUTAneous Nightly    isosorbide mononitrate  30 mg Oral Daily    latanoprost  1 drop Right Eye Nightly    pantoprazole  40 mg Oral QAM AC    metoprolol succinate  25 mg Oral Daily    [Held by provider] ticagrelor  90 mg Oral BID    lanthanum  1,000 mg Oral TID WC    brimonidine  1 drop Right Eye BID    And    timolol  1 drop Right Eye BID     Continuous Infusions:   sodium chloride      dextrose       PRN Meds:sodium chloride flush, sodium chloride, fentanNYL, lidocaine-prilocaine, midodrine, acetaminophen, melatonin, polyethylene glycol, glucose, dextrose, glucagon (rDNA), dextrose, ondansetron, oxyCODONE-acetaminophen    ROS:  As mentioned in subjective, all other systems negative      BP (!) 107/45   Pulse 56   Temp 97.9 °F (36.6 °C)   Resp 18   Ht 5' 10\" (1.778 m)   Wt 167 lb 8.8 oz (76 kg)   SpO2 94%   BMI 24.04 kg/m²     Physical Exam  Constitutional: The patient is awake, alert, and oriented. Skin: Warm and dry. No jaundice. HEENT: Eyes show round, and reactive pupils. Neck: Supple to movements. No lymphadenopathy. Chest: Clear to auscultation posteriorly  Cardiovascular: S1 and S2 are rhythmic and regular. No murmurs appreciated. Abdomen: Soft nontender  Extremities: No clubbing, no cyanosis, no edema. Musculoskeletal: Has good strength in bilateral lower extremities and upper extremities. Left upper extremity AV fistula site looks good.   Has multiple prior scars lumbar spine no redness or swelling. Has tenderness in the lower back. Neurological: Alert and oriented x 3,   Lines: peripheral    Labs, Cultures reviewed  Radiology reviewed    Microbiology:   Blood cx 4/14: negative so far    Assessment:  · Lumbar spine osteomyelitis: History of prior back surgery and recent epidural nerve block. · Pending cardiology and CT surgery clearance for anesthesia for bone biopsy. · ESRD on HD:     Plan:    · Pending bone biopsy   · Awaiting clearance for cardiology and CT surgery. · Discussed with the patient that it may not be safe to wait long time without antibiotics, but she is clinically stable and agree to wait without antibiotics. · Will follow with you.      Electronically signed by Pedro Luis Brooks MD on 4/15/2022 at 10:02 AM

## 2022-04-15 NOTE — PROGRESS NOTES
Hospitalist Progress Note      SYNOPSIS: Patient admitted on 2022 for Unable to ambulate has a past medical history that includes spinal stenosis, ESRD on dialysis, chronic anemia, hypertension, hyperlipidemia, diabetes mellitus, gout, coronary artery disease, mitral valve papillary fibroblastoma.     Claudia presented to the ER with back pain and issues with ambulation. She states she was unable to get out of bed to go to dialysis today. The patient has significant heart disease including valvular disease that anesthesiology has stated as a barrier to her doing surgery on her back.  Unfortunately, until she is able to ambulate effectively for post surgical rehab, the patient cannot have corrective surgery on her heart either.  Due to this difficult position, the patient was treated about a month ago with a nerve block of her back which was effective for about a month and she was able to do physical therapy. She was recently discharged from SNF on 4/3. However today pain caused patient to be unable to go to dialysis today. She denies any bowel or bladder incontinence.     CT of the lumbar spine showed new loss of height in L1 inferior endplate with increased soft tissue inflammation. Findings suspicious for possible osteomyelitis.     Patient has spinal cord stimulator so is unable to have MRI      SUBJECTIVE:  Stable overnight. No other overnight issues reported. Patient seen and examined  Records reviewed. Anesthesia wishes to have cardiac clearance prior to bone biopsy        Temp (24hrs), Av.4 °F (36.9 °C), Min:97.9 °F (36.6 °C), Max:98.7 °F (37.1 °C)    DIET: Diet NPO  CODE: Full Code  No intake or output data in the 24 hours ending 04/15/22 0811    Review of Systems  All bolded are positive; please see HPI  General:  Fever, chills, diaphoresis, fatigue, malaise, night sweats, weight loss  Psychological:  Anxiety, disorientation, hallucinations.   ENT:  Epistaxis, headaches, vertigo, visual changes. Cardiovascular:  Chest pain, irregular heartbeats, palpitations, paroxysmal nocturnal dyspnea. Respiratory:  Shortness of breath, coughing, sputum production, hemoptysis, wheezing, orthopnea. Gastrointestinal:  Nausea, vomiting, diarrhea, heartburn, constipation, abdominal pain, hematemesis, hematochezia, melena, acholic stools  Genito-Urinary:  Dysuria, urgency, frequency, hematuria  Musculoskeletal:  Joint pain, joint stiffness, joint swelling, muscle pain back pain  Neurology:  Headache, focal neurological deficits, weakness, numbness, paresthesia  Derm:  Rashes, ulcers, excoriations, bruising  Extremities:  Decreased ROM, peripheral edema, mottling      OBJECTIVE:    BP (!) 104/33   Pulse 56   Temp 97.9 °F (36.6 °C)   Resp 18   Ht 5' 10\" (1.778 m)   Wt 167 lb 8.8 oz (76 kg)   SpO2 94%   BMI 24.04 kg/m²     General appearance:  awake, alert, and oriented to person, place, time, and purpose; appears stated age and cooperative; no apparent distress no labored breathing  HEENT:  Conjunctivae/corneas clear. Neck: Supple. No jugular venous distention. Respiratory: symmetrical; clear to auscultation bilaterally; no wheezes; no rhonchi; no rales  Cardiovascular: rhythm regular; rate controlled; no murmurs  Abdomen: Soft, nontender, nondistended  Extremities:  peripheral pulses present; no peripheral edema; no ulcers  Musculoskeletal: No clubbing, cyanosis, no bilateral lower extremity edema. Brisk capillary refill. Skin:  No rashes  on visible skin  Neurologic: awake, alert and following commands     ASSESSMENT and PLAN:  · Back pain with spinal stenosis and possible osteomyelitis of lumbar spine -Neurosurgery consultation. Pain control. ID consulted. Patient was unable to get MRI due to stimulator. Bone biopsy cancelled by anesthesia yesterday, want to obtain cardiac clearance first by cardiology.    · CAD- with h/o LAD and RCA stents 5/2021 and with cath 12/21 showing patent LAD stent, Meds: sodium chloride flush, sodium chloride, fentanNYL, lidocaine-prilocaine, midodrine, acetaminophen, melatonin, polyethylene glycol, glucose, dextrose, glucagon (rDNA), dextrose, ondansetron, oxyCODONE-acetaminophen    Labs:     Recent Labs     04/13/22  0717 04/15/22  0456 04/15/22  0605   WBC 7.9 5.6 5.5   HGB 9.5* 9.6* 9.3*   HCT 32.6* 31.9* 31.7*    351 360       Recent Labs     04/13/22  0717 04/15/22  0456 04/15/22  0605    142 142   K 4.6 4.7 4.4    103 104   CO2 29 24 25   BUN 32* 29* 30*   CREATININE 5.2* 5.1* 5.2*   CALCIUM 8.8 8.9 8.9   PHOS  --  4.1  --        Recent Labs     04/13/22  0717 04/15/22  0456 04/15/22  0605   PROT 6.5 6.0* 6.2*   ALKPHOS 109* 103 104   ALT 22 31 31   AST 22 39* 38*   BILITOT 0.3 0.4 0.4       No results for input(s): INR in the last 72 hours. No results for input(s): Kirill Hug in the last 72 hours. Chronic labs:    Lab Results   Component Value Date    CHOL 100 04/15/2022    TRIG 107 04/15/2022    HDL 27 04/15/2022    LDLCALC 52 04/15/2022    TSH 1.500 04/15/2022    INR 1.0 01/20/2022    LABA1C 5.5 04/15/2022       Radiology: REVIEWED DAILY    +++++++++++++++++++++++++++++++++++++++++++++++++  DO Nick Chavez Physician - [facility], [place]  +++++++++++++++++++++++++++++++++++++++++++++++++  NOTE: This report was transcribed using voice recognition software. Every effort was made to ensure accuracy; however, inadvertent computerized transcription errors may be present.

## 2022-04-15 NOTE — FLOWSHEET NOTE
04/15/22 0926   Vital Signs   BP (!) 107/45   Temp 97.9 °F (36.6 °C)   Pulse 56   Resp 18   Weight 167 lb 8.8 oz (76 kg)   Weight Method Bed scale   Percent Weight Change 0   Pain Assessment   Pain Assessment 0-10   Pain Level 0   Post-Hemodialysis Assessment   Post-Treatment Procedures Blood returned; Access bleeding time < 10 minutes   Machine Disinfection Process Exterior Machine Disinfection   Rinseback Volume (ml) 300 ml   Total Liters Processed (l/min) 82.4 l/min   Dialyzer Clearance Clotted   Duration of Treatment (minutes) 194 minutes   Heparin amount administered during treatment (units) 0 units   Hemodialysis Intake (ml) 50 ml   Hemodialysis Output (ml) 198 ml   NET Removed (ml) 148 ml   Tolerated Treatment Fair   Patient Response to Treatment asymptomatic hypotension, no fluid removal, -148 ml, system clotted with 44 minutes remaining, arterial blood returned, unable to return venous blood, Dr Rodriguez made aware of clotted system   Bilateral Breath Sounds Diminished   Edema None

## 2022-04-15 NOTE — CONSULTS
Inpatient Cardiology Consultation      Reason for Consult:  Pre-op risk assessment prior to biopsy     Consulting Physician: Dr. Kateryna Rowley     Requesting Physician:  Dr. Emil Oliva     Date of Consultation: 4/15/2022    HISTORY OF PRESENT ILLNESS:   Ms. Miguelito Cat 67-year-old -American female who is known to Dr. Mey Gambino. He was most recently seen in hospital consultation on 3/10/2022 for cardiac risk stratification for surgical decompression instability station of L2-3 with extension of the posterior lumbar interbody fusion from L4-L5 and L3-L4 to L2-L3 and possible decompression of L1-L2. General cardiology was asked to see the patient for cardiac risk stratification. Patient recently underwent a LHC 12/2021 with mild disease involving the LAD, totally occluded left circumflex, total occlusion of the second OM branch with left to left collaterals and significant ostial RCA and significant mid RCA disease --> evaluated by CTS recommending a CABG x2 with a saphenous vein graft to the RCA and a saphenous vein graft to the circumflex and excision of mitral valve mass with possible MVrp --> cardiac surgery was postponed due to patient presenting with osteomyelitis of the left distal third phalanges --> requiring antibiotics and being amputation of the distal left third phalanx. CT surgery was consulted 3/15/2022 for cardiac risk stratification for amputation of the distal left third phalanx (with nerve block)--> recommending no urgent cardiac surgical intervention needed / will require close follow-up in outpatient after completed rehab. No follow-up with CTS or cardiology since that time. [facility] on 4/13/2022 with complaints of back pain now with trouble ambulatin. Neurosurgery was consulted for spinal osteomyelitis (L1-L2 and grade 2 spondylolisthesis of L2-L3 and status post lumbar interbody fusion L3-L4 and L4-L5). Patient was recommended to have a bone biopsy recommending general anesthesia. Therefore, cardiology was asked to see the patient for cardiac risk stratification prior to a bone biopsy under general anesthesia. Upon arrival to the ED on 4/13/2022: Blood pressure 129/45, heart rate 63, afebrile on room air. Initial labs: Sodium 140, potassium 4.6, BUN 32, creatinine 5.2, procalcitonin 0.67. CRP 7.7. Sed rate 81. High-sensitivity troponin 219, 213 (chronically elevated troponins in the setting of end-stage renal disease)  proBNP >70K (on hemodialysis)  Albumin 3.1, WBC 7.9, H/H 9.5/32.6, platelet count 388. Blood cultures: No 24-hour growth. CT lumbar spine without contrast: New loss of height of L1 inferior endplate with increased soft tissue inflammation, suspicious for possible osteomyelitis. (Unable to have MRI secondary to abandon spinal stimulator). EKG: Sinus bradycardia with nonspecific T wave changes, rate 57 bpm.     Interim history: ID consulted for spinal osteomyelitis --> antibiotics held until bone biopsy is completed. Nephrology was consulted for: End-stage renal disease management. Please note: past medical records were reviewed per electronic medical record (EMR) - see detailed reports under Past Medical/ Surgical History. Past Medical History:    1. Jehovah Witness  2. 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 3. HTN  4. HLD  5. BMI 27.1 on 5/19/2021  6. Anemia  7. Bladder carcinoma s/p chemotherapy  8. Right breast cancer with mastectomy  2005 followed by radiation and chemotherapy, Arimidex. Chronic  lymphedema  RUE. Susan Barn states became a therapy because of multiple back issues and weakened her bones,  port in her right upper chest  9. [facility] admission with SOB and chest discomfort 08/2011.  Cardiac enzymes negative.  CT angiogram of the chest negative for PE and dissection.  EKG:  NSR, nonspecific ST T abnormalities.    10. SABINE 20/2871 with severe concentric LVH, normal wall motion.  LAE, RVE, Stage I diastolic dysfunction. 11.  MPS 08/2011 with small area of lateral ischemia, normal wall motion and EF.    12. Cardiac catheterization 08/30/2012:  CX 90% mid stenosis, OM2 totally occluded.  LAD 20% stenosis, RVA 50% stenosis, LV normal.  She was treated medically.    13. [facility] admission 02/13/2013 with SOB, orthopnea, nonproductive nocturnal cough and edema.  CXR:  cardiomegaly and bilateral effusion.  EKG:  NSR, LAE, low voltage, PVC, poor R wave progression.  BUN 23, Cr 1.5, BNP 1868.  Cardiac enzymes negative.  Hemoglobin 9.9, WBC 5.4.  Rx with aggressive diuretic therapy.    14. Echo 02/14/2013: [address], normal LV systolic function, Stage II diastolic dysfunction, LAE, normal RVSP, no valvulopathy. 15. Lifetime non-smoker. 16. NKDA.  She had worsening renal function in the past when taking lasix. 17. Echo, 07/17/2013. Mild concentric LVH with normal systolic function. RVSP 53 mmHg. Otherwise normal study. 18. [facility] admission with worsening back pain and inability to walk on 09/22/2014 associated with mild SOB. CXR: Pulmonary vascular congestion. EKG: NSR, possible old inferior MI. Electrolytes normal, BUN=30, Cr=2.0, YOR=68980, Hb=11.1, WBC=5.9, cardiac enzymes negative. 19. Echo, 09/23/2014. Moderate CLVH, normal wall motion, Stage II diastolic dysfunction, RVE, OFELIA, RVSP=50 mmHg. 20. Surgery, Dr. Rachelle Gregg, 09/25/2014. Decompressive lumbar laminectomy L2-L5 with fusion L3-4 and L4-5. 21. Insertion intrathecal drain, 10/04/2014 for CSF leak, removed several days later, but reinserted 10/12/2014 for recurrent leak. 22. Implant spinal cord stimulator, Penta lead from [facility] by thoracic laminotomy, T10.   Implant IPG Protege in the right buttock  23. TTE 11/2017, EF 55%.  Mild LVH.  Stage II diastolic dysfunction.  Moderate to severe left atrial dilatation.  Mild MR.  RVSP 76.  24. 2017 ESRD dialysis initiated  25. Abdominal CT 2017: Extensive anasarca.  Extensive arteriosclerotic disease.   26. Surgical history: Correction of Charcot joint right, carpal tunnel release 3/2020, cholecystectomy, dialysis fistula creation, mastectomy, laser treatment of eyes, spinal cord stimulator, surgery for retinal detachment.  Spinal cord decompression L2.  27. 11/2017 Insertion of right internal jugular vein tunneled hemodialysis catheter fluoroscopy Removal of right femoral temporary hemodialysis catheter  28. 1/31/2018 Creation of L brachiocephalic AVF  29. 9/71/4087 [id number] of left upper extremity brachiobasilic  fistula, stage II  30. 7/96/5680 [id number] of L basilic AVF  31. 49/44/5592  TTE: EF 60-65%. Moderate CLVH. No VHD. 32. Ambulates with walker  33. Completed COVID Vaccine  29. 2D echocardiogram [facility] December 8, 2020 EF 60 to 04% and diastolic dysfunction with moderate concentric left ventricular hypertrophy and moderate MAC and aortic valve sclerosis  35. Hospital admission May 2021 with a reported shockable rhythm by AED and received 1 defibrillation and had reported ventricular tachycardia in the emergency room and was started on a lidocaine drip, elevated troponin but no acute ischemic EKG changes, started on aspirin and Lipitor resumed and beta-blockers and nitrates initiated  36. Lexiscan stress test May 19, 2021, abnormal with a large fixed defect in the apical and septal wall small fixed defect in the inferior apical wall partially reversible defect in the small area of the anterior lateral wall and EF 25% with apical septal akinesis and moderate global hypokinesis and dilated left ventricle during stress and rest and was a high risk study  37. Left heart cath May 2021 she underwent stenting to the LAD and RCA. CONCLUSION: Coronary artery disease: Left main:  20% eccentric mid vessel narrowing. LAD:  50% proximal vessel narrowing and 95% mid vessel stenosis. Trivial diagonal branch with 90% stenosis.  LCX:  Occluded after a small caliber first marginal branch which is a chronic total occlusion.  The second larger marginal branch filled late. RCA:  Large, dominant vessel with 90% heavily calcified mid vessel stenosis.  50% disease at the level of the crux and 70% ostial stenosis of the posterior descending artery branch. Markedly elevated left ventricular end-diastolic pressure. Successful balloon angioplasty with deployment of drug-eluting coronary stent to the mid LAD with very good results. Successful balloon angioplasty with deployment of drug-eluting coronary stent to the mid RCA with poststent deployment dilatation with a larger high-pressure noncompliant balloon with good results. 38. Lasix allergy with questionable renal failure   39. 2D echocardiogram on May 21, 2021 left ventricle is dilated There is apical, septal and basal inferior wall severe hypokinesis to akinesis Ejection fraction is visually estimated at 30+/-5%. There is doppler evidence of stage III diastolic dysfunction. Normal right ventricular size. Right ventricle global systolic function is mildly reduced . The left atrium is moderately dilated. Severe posterior mitral annular calcification. Focal calcification mitral valve leaflets. Chordal calcification is present. A mobile well defined 1.0 cm by 0.5 cm echo density is noted on the ventricular aspect of the mitral valve suggestive of a fibroelastoma: clinical correlation is needed. No mitral stenosis. Mild mitral regurgitation. Mild to moderate tricuspid regurgitation. Moderate pulmonary hypertension. Aortic root is sclerotic and calcified.   40. 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 6 cm x .8cm  41.  KIARA November 11, 2021 for mitral leaflet mass Normal LV function, normal RV function, no hemodynamically significant valve disease(mobile posterior leaflet echodensity with leaflet restriction and mild MR), no evidence of vegetations, no left atrial or left atrial appendage thrombus (no evidence of spontaneous echo contrast), no intraatrial shunting on bubble study, no significant atherosclerosis of the aorta  42. Left heart cath December 9, 2021 ANGIOGRAPHIC FINDINGS: The left main coronary artery arises normally from the left sinus of Valsalva.  It is a large vessel without any disease. Mindy Delgado is mild distal calcification.  It bifurcates into left anterior descending artery and left circumflex artery. The left anterior descending artery has moderate-to-severe proximal and ostial calcifications.  There is 20% ostial disease.  The rest of the vessel is mildly diffusely diseased.  There is patent stent in the mid segment.  The LAD gives off a few small diagonal branches.  The second one is totally occluded.  The rest are mildly diseased. The left circumflex artery is a large vessel that has total occlusion after the takeoff of the second OM branch.  The first OM branch is a mid size vessel that is mildly diseased.  The second one is the small that has chronic total occlusion with a faint left-to-left filling collaterals. The right coronary artery has inferior takeoff.  There are stents in the mid and proximal segments.  There is 80% in-stent stenosis in the mid segment.  The rest of the vessel has luminal irregularities.  It gives off good size right PDA and good size right PLV that has no significant CAD noted. Mindy Delgado is significant ostial RCA also noted as evident by damping of pressure on engagement and lack of contrast reflux. IMPRESSION: Mild disease involving left anterior descending artery with a patent stent in the mid segment. Totally occluded left circumflex artery. Total occlusion of the second OM branch with left-to-left collaterals. Significant ostial RCA and significant mid RCA disease as mentioned above. RECOMMENDATIONS:  Continue current treatment as per CT Surgery.   Per CTS, she was taking scheduled for a CABG x2 With a saphenous vein graft to the RCA and a saphenous vein graft to the circumflex and excision of mitral valve mass  43. January 3, 2022 osteomyelitis left middle finger left hand, January 20, 2022 amputation left third digit distal phalanx for osteomyelitis (open heart postponed) patient stopped taking aspirin at that time but was only of Brilinta a day or 2  44. Chronic hemodialysis on Monday Wednesday and Friday/renal osteodystrophy  45. Anemia         Medications Prior to admit:  Prior to Admission medications    Medication Sig Start Date End Date Taking? Authorizing Provider   midodrine (PROAMATINE) 5 MG tablet Take 5 mg by mouth daily as needed   Yes Historical Provider, MD   gabapentin (NEURONTIN) 100 MG capsule Take 2 capsules by mouth 3 times daily for 180 days.  3/24/22 9/20/22  Charlotte Lares MD   atorvastatin (LIPITOR) 80 MG tablet Take 80 mg by mouth nightly    Historical Provider, MD   bumetanide (BUMEX) 2 MG tablet Take 2 mg by mouth three times a week Given Sunday,Tuesday,Thursday    Historical Provider, MD   isosorbide mononitrate (IMDUR) 30 MG extended release tablet Take 30 mg by mouth daily    Historical Provider, MD   insulin glargine (LANTUS) 100 UNIT/ML injection vial Inject 5 Units into the skin nightly     Historical Provider, MD   lidocaine-prilocaine (EMLA) 2.5-2.5 % cream Apply topically as needed for Pain     Historical Provider, MD   hydrALAZINE (APRESOLINE) 10 MG tablet Take 1 tablet by mouth 2 times daily 2/3/22   Ryan Mason MD   metoprolol succinate (TOPROL XL) 25 MG extended release tablet Take 1 tablet by mouth daily 11/24/21   Ryan Mason MD   lanthanum (FOSRENOL) 1000 MG chewable tablet Take 1,000 mg by mouth 3 times daily (with meals)  8/9/21   Historical Provider, MD   allopurinol (ZYLOPRIM) 100 MG tablet Take 1 tablet by mouth daily 9/7/21   Ryan Mason MD   ticagrelor (BRILINTA) 90 MG TABS tablet Take 1 tablet by mouth 2 times daily 9/7/21   Ryan Mason MD   B Complex-C-Folic Acid (BONI CAPS) 1 MG CAPS Take 1 mg by mouth nightly     Historical Provider, MD   omeprazole (PRILOSEC) 20 MG delayed release capsule Take 20 mg by mouth daily as needed     Historical Provider, MD MCHUGH 0.01 % SOLN ophthalmic drops Place 1 drop into the right eye nightly  6/9/20   Historical Provider, MD   COMBIGAN 0.2-0.5 % ophthalmic solution Place 1 drop into the right eye every 12 hours  8/1/19   Historical Provider, MD       Current Medications:    Current Facility-Administered Medications: sodium chloride flush 0.9 % injection 5-40 mL, 5-40 mL, IntraVENous, 2 times per day  sodium chloride flush 0.9 % injection 5-40 mL, 5-40 mL, IntraVENous, PRN  0.9 % sodium chloride infusion, , IntraVENous, PRN  fentaNYL (SUBLIMAZE) injection 25 mcg, 25 mcg, IntraVENous, Q1H PRN  atorvastatin (LIPITOR) tablet 80 mg, 80 mg, Oral, Nightly  allopurinol (ZYLOPRIM) tablet 100 mg, 100 mg, Oral, Daily  b complex-C-folic acid (NEPHROCAPS) capsule 1 mg, 1 mg, Oral, Nightly  bumetanide (BUMEX) tablet 2 mg, 2 mg, Oral, Once per day on Sun Tue Thu  gabapentin (NEURONTIN) capsule 200 mg, 200 mg, Oral, TID  hydrALAZINE (APRESOLINE) tablet 10 mg, 10 mg, Oral, BID  insulin glargine-yfgn (SEMGLEE-YFGN) injection vial 5 Units, 5 Units, SubCUTAneous, Nightly  isosorbide mononitrate (IMDUR) extended release tablet 30 mg, 30 mg, Oral, Daily  lidocaine-prilocaine (EMLA) cream, , Topical, PRN  latanoprost (XALATAN) 0.005 % ophthalmic solution 1 drop, 1 drop, Right Eye, Nightly  midodrine (PROAMATINE) tablet 5 mg, 5 mg, Oral, Daily PRN  pantoprazole (PROTONIX) tablet 40 mg, 40 mg, Oral, QAM AC  metoprolol succinate (TOPROL XL) extended release tablet 25 mg, 25 mg, Oral, Daily  [Held by provider] ticagrelor (BRILINTA) tablet 90 mg, 90 mg, Oral, BID  acetaminophen (TYLENOL) tablet 650 mg, 650 mg, Oral, Q4H PRN  melatonin tablet 3 mg, 3 mg, Oral, Nightly PRN  polyethylene glycol (GLYCOLAX) packet 17 g, 17 g, Oral, Daily PRN  glucose (GLUTOSE) 40 % oral gel 15 g, 15 g, Oral, PRN  dextrose 50 % IV solution, 12.5 g, IntraVENous, PRN  glucagon (rDNA) injection 1 mg, 1 mg, IntraMUSCular, PRN  dextrose 5 % solution, 100 mL/hr, IntraVENous, PRN  lanthanum (FOSRENOL) chewable tablet 1,000 mg, 1,000 mg, Oral, TID WC  ondansetron (ZOFRAN) injection 4 mg, 4 mg, IntraVENous, Q6H PRN  brimonidine (ALPHAGAN) 0.2 % ophthalmic solution 1 drop, 1 drop, Right Eye, BID **AND** timolol (TIMOPTIC) 0.5 % ophthalmic solution 1 drop, 1 drop, Right Eye, BID  oxyCODONE-acetaminophen (PERCOCET) 5-325 MG per tablet 1 tablet, 1 tablet, Oral, Q4H PRN    Allergies:  Furosemide (swelling / renal failure)     Social History:    Resides at home alone. Uses a walker for ambulation. Denies alcohol and illicit drug use. Lifelong nonsmoker. Family History:   Father: Hx of MI with stents (age 63's)     REVIEW OF SYSTEMS:     · Constitutional: + fatigue. Denies fevers, chills or night sweats  · Eyes: Denies visual changes or drainage  · ENT: Denies headaches or hearing loss. No mouth sores or sore throat. No epistaxis   · Cardiovascular: Denies chest pain, pressure or palpitations. No lower extremity swelling. · Respiratory: +FRENCH. Denies cough, orthopnea or PND. No hemoptysis   · Gastrointestinal: Denies hematemesis or anorexia. No hematochezia or melena    · Genitourinary: Denies urgency, dysuria or hematuria. · Musculoskeletal: See HPI. · Integumentary: Denies rash, hives or pruritis   · Neurological: Denies dizziness, headaches or seizures. No numbness or tingling  · Psychiatric: Denies anxiety or depression. · Endocrine: Denies temperature intolerance. No recent weight change. .  · Hematologic/Lymphatic: Denies abnormal bruising or bleeding. No swollen lymph nodes    PHYSICAL EXAM:   BP (!) 113/33   Pulse 57   Temp 97.9 °F (36.6 °C)   Resp 18   Ht 5' 10\" (1.778 m)   Wt 167 lb 8.8 oz (76 kg)   SpO2 94%   BMI 24.04 kg/m²   CONST:  Well developed, well nourished middle aged AAF who appears of stated age. Awake, alert and cooperative. No apparent distress.    HEENT:   Head- Normocephalic, atraumatic   Eyes- Conjunctivae pink, anicteric  Throat- Oral mucosa pink and moist  Neck-  No stridor, trachea midline, no jugular venous distention. No carotid bruit. CHEST: Chest symmetrical and non-tender to palpation. No accessory muscle use or intercostal retractions  RESPIRATORY: Lung sounds - diminished in bases. On RA. CARDIOVASCULAR:     Heart Inspection- shows no noted pulsations  Heart Palpation- no heaves or thrills; PMI is non-displaced   Heart Ausculation- Regular rate and rhythm, no murmur. No s3, s4 or rub   PV: No lower extremity edema. No varicosities. Pedal pulses palpable, no clubbing or cyanosis   ABDOMEN: Soft, non-tender to light palpation. Bowel sounds present. No palpable masses no organomegaly; no abdominal bruit  MS: Good muscle strength and tone. No atrophy or abnormal movements. : Deferred  SKIN: Warm and dry no statis dermatitis or ulcers   NEURO / PSYCH: Oriented to person, place and time. Speech clear and appropriate. Follows all commands.  Pleasant affect     DATA:    ECG:SB, Nonspecific T wave changes, rate 57 bpm.   Tele strips: SR with PVCs, rate 60 bpm.   Diagnostic:    Labs:   CBC:   Recent Labs     04/15/22  0456 04/15/22  0605   WBC 5.6 5.5   HGB 9.6* 9.3*   HCT 31.9* 31.7*    360     BMP:   Recent Labs     04/15/22  0456 04/15/22  0605    142   K 4.7 4.4   CO2 24 25   BUN 29* 30*   CREATININE 5.1* 5.2*   LABGLOM 10 10   CALCIUM 8.9 8.9     Mag:   Recent Labs     04/15/22  0456   MG 2.4     Phos:   Recent Labs     04/15/22  0456   PHOS 4.1     TFT:   Lab Results   Component Value Date    TSH 1.500 04/15/2022    [id number] 5.0 12/24/2012    T4FREE 1.58 05/19/2021      HgA1c:   Lab Results   Component Value Date    LABA1C 5.5 04/15/2022     proBNP:   Recent Labs     04/13/22  0717   PROBNP >70,000*     CARDIAC ENZYMES:  Recent Labs     04/13/22  0717 04/13/22  0913   TROPHS 219* 213*     FASTING LIPID PANEL:  Lab Results   Component Value Date CHOL 100 04/15/2022    HDL 27 04/15/2022    LDLCALC 52 04/15/2022    TRIG 107 04/15/2022     LIVER PROFILE:  Recent Labs     04/15/22  0456 04/15/22  0605   AST 39* 38*   ALT 31 31   LABALBU 2.8* 2.9*     CT Lumbar spine without contrast: 4/13/2022   New loss of height of L1 inferior endplate with increased soft tissue   inflammation.  Finding is suspicious for possible osteomyelitis.  Further   evaluation with lumbar spine MRI may be considered. ASSESSMENT:  1. Pre op cardiac risk stratification for bone biopsy under general anesthesia. 2. CAD with h/o LAD and RCA stents 5/2021 and with cath 12/21 showing patent LAD stent, hi grade focal in stent restenosis of the RCA and known  of OM2. Patient is scheduled for non-urgent CABG x 2 with resection of the MV fibroelastoma and possible mitral valve repair in the near future TBD). 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 Insulin requiring DM with neuropathy, retinopathy and nephropathy  5. ESRD on hemodialysis  6. Borderline low BP. H/o HTN  7. HLD  8. Jehovah Witness  9. Bladder carcinoma s/p chemotherapy  10. DDD lumbar spine with h/o lumbar laminectomy and spinal cord stimulator with intractable back pain  11. S/p amputation of left hand distal middle finger for osteomyelitis  12. Hypoalbuminemia   13. Chronic anemia. · Patient is at high risk for procedure under general anesthesia. Patient wishes to speak with CTS surgery to discuss risks due to her needing non-urgent CABG x 2 with resection of the MV fibroelastoma and possible mitral valve repair in the near future TBD). · Okay to hold Brilinta / ASA   · Continue current medications   · No further cardiac testing needed or planned at this time. · Will follow. The above assessment and plan as per Dr. Cameron Cronin.   Electronically signed by GOYO Phillips CNP on 4/15/22 at 12:16 PM EDT      Patient seen and examined and case discussed in detail with cardiology nurse practitioner. Nathaly Mauricio agree with the H&P and A &P discussed in detail and documented above.  I reviewed the chart and talked to patient, and performed physical examination as well as discussed the case in detail with cardiology nurse practitioner as well as reviewed the labs and imaging.  I contributed more than 50% of the work.

## 2022-04-15 NOTE — PROGRESS NOTES
[facility]  Nephrology Consult Note    Patient's Name: Nicki Rosas     Reason for Consult:  dialysis      Chief Complaint:   Back pain  History Obtained From:  patient, past medical records and EMR     History of Present Illness:    Nicki Rosas is a 78 y.o. female with a past medical history of neuropathy, hypertension, hyperlipidemia, breast cancer, and coronary artery disease. She presented to the ED on 4/13, per the ED provider note with back pain. Vital signs at presentation to the ED include temperature 98, respirations 18, pulse 63, /45, and she was 94% on room air. Lab data on presentation to the ED includes BUN 32, creatinine 5.2, proBNP > 70,000, and hemoglobin 9.5. Patient had a recent hospital stay from 3/9-3/18 for low back pain, during that time she had received epidural nerve block. We were consulted to see the patient for dialysis. Patient is known to our service and dialyzes at [address] MWF first shift via a left AV fistula. At present, patient was seen and examined on HD. She explained that she came home from rehab on Monday. She reported that she had too much pain this morning and decided to come to the hospital.  She did report a little bit of nausea this morning. She also explained that she has been experiencing some fatigue. She denies any chest pain or shortness of breath. She denies any abdominal pain or vomiting. Denies any headaches or dizziness. She denies any weakness.         Patient Active Problem List   Diagnosis    Diabetic retinopathy ([address]    Malignant neoplasm of right female breast ([address]    Atherosclerosis of native coronary artery of native heart without angina pectoris    Moderate obesity    Left ventricular hypertrophy    Herniated lumbar intervertebral disc    Lumbar degenerative disc disease    Pseudomeningocele    Lumbar radiculopathy    Lymphedema of arm    CKD (chronic kidney disease) stage 4, GFR 15-29 ml/min ([address]    Insulin dependent type 2 diabetes mellitus (HCC)    Anemia of chronic disease    Chronic diastolic CHF (congestive heart failure) ([facility])    Neuropathy    Hypertension    Glaucoma    Refusal of blood product    Pancytopenia ([facility])    Controlled type 2 diabetes mellitus with chronic kidney disease on chronic dialysis, with long-term current use of insulin ([facility])    Vitreous hemorrhage ([address]    Patient is Buddhism    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 ([facility])    Hyperkalemia, diminished renal excretion    Chronic pain syndrome    Lumbar post-laminectomy syndrome    Myalgia    Cervicalgia    Diabetic peripheral neuropathy ([address]    ESRD (end stage renal disease) ([address]    Bilateral carpal tunnel syndrome    Spinal stenosis of lumbar region with neurogenic claudication    Cardiac arrest ([address]    ESRD (end stage renal disease) on dialysis ([address]    Mixed hyperlipidemia    Lymphedema of right upper extremity    Coronary artery disease involving native coronary artery of native heart with angina pectoris ([facility])    Ventricular tachycardia ([address]    Mitral valve disease    CAD in native artery    Lumbar stenosis without neurogenic claudication    Lumbar foraminal stenosis    Back pain    Intractable low back pain    Unable to ambulate         Subjective    4/14:  For lumbar bone vertebral biopsy today; she says back pain persists    4/15: Seen during hemodialysis: bone Biopsy rescheduled for today; back pain persists        Meds:     sodium chloride flush  5-40 mL IntraVENous 2 times per day    atorvastatin  80 mg Oral Nightly    allopurinol  100 mg Oral Daily    b complex-C-folic acid  1 mg Oral Nightly    bumetanide  2 mg Oral Once per day on Sun Tue Thu    gabapentin  200 mg Oral TID    hydrALAZINE  10 mg Oral BID    insulin glargine-yfgn  5 Units SubCUTAneous Nightly    isosorbide mononitrate  30 mg Oral Daily    latanoprost  1 drop Right Eye Nightly  pantoprazole  40 mg Oral QAM AC    metoprolol succinate  25 mg Oral Daily    [Held by provider] ticagrelor  90 mg Oral BID    lanthanum  1,000 mg Oral TID WC    brimonidine  1 drop Right Eye BID    And    timolol  1 drop Right Eye BID      sodium chloride      dextrose       Meds prn:     sodium chloride flush, sodium chloride, fentanNYL, lidocaine-prilocaine, midodrine, acetaminophen, melatonin, polyethylene glycol, glucose, dextrose, glucagon (rDNA), dextrose, ondansetron, oxyCODONE-acetaminophen    Meds prior to admission:     No current facility-administered medications on file prior to encounter. Current Outpatient Medications on File Prior to Encounter   Medication Sig Dispense Refill    midodrine (PROAMATINE) 5 MG tablet Take 5 mg by mouth daily as needed      gabapentin (NEURONTIN) 100 MG capsule Take 2 capsules by mouth 3 times daily for 180 days.  180 capsule 5    atorvastatin (LIPITOR) 80 MG tablet Take 80 mg by mouth nightly      bumetanide (BUMEX) 2 MG tablet Take 2 mg by mouth three times a week Given Sunday,Tuesday,Thursday      isosorbide mononitrate (IMDUR) 30 MG extended release tablet Take 30 mg by mouth daily      insulin glargine (LANTUS) 100 UNIT/ML injection vial Inject 5 Units into the skin nightly       lidocaine-prilocaine (EMLA) 2.5-2.5 % cream Apply topically as needed for Pain       hydrALAZINE (APRESOLINE) 10 MG tablet Take 1 tablet by mouth 2 times daily 180 tablet 1    metoprolol succinate (TOPROL XL) 25 MG extended release tablet Take 1 tablet by mouth daily 90 tablet 1    lanthanum (FOSRENOL) 1000 MG chewable tablet Take 1,000 mg by mouth 3 times daily (with meals)       allopurinol (ZYLOPRIM) 100 MG tablet Take 1 tablet by mouth daily 90 tablet 1    ticagrelor (BRILINTA) 90 MG TABS tablet Take 1 tablet by mouth 2 times daily 180 tablet 1    B Complex-C-Folic Acid (BONI CAPS) 1 MG CAPS Take 1 mg by mouth nightly       omeprazole (PRILOSEC) 20 MG delayed release capsule Take 20 mg by mouth daily as needed       LUMIGAN 0.01 % SOLN ophthalmic drops Place 1 drop into the right eye nightly       COMBIGAN 0.2-0.5 % ophthalmic solution Place 1 drop into the right eye every 12 hours   7     Allergies:    Furosemide      Physical Exam:    Patient Vitals for the past 24 hrs:   BP Temp Temp src Pulse Resp SpO2 Weight   04/15/22 0926 (!) 107/45 97.9 °F (36.6 °C) -- 56 18 -- 167 lb 8.8 oz (76 kg)   04/15/22 0900 (!) 113/33 -- -- 57 -- -- --   04/15/22 0830 (!) 96/22 -- -- 56 -- -- --   04/15/22 0800 (!) 91/33 -- -- 55 -- -- --   04/15/22 0730 (!) 104/33 -- -- 56 -- -- --   04/15/22 0700 (!) 92/22 -- -- 55 -- -- --   04/15/22 0630 (!) 90/36 -- -- 59 -- -- --   04/15/22 0606 (!) 103/34 -- -- 61 -- -- --   04/15/22 0601 (!) 122/59 97.9 °F (36.6 °C) -- 89 18 -- 167 lb 8.8 oz (76 kg)   04/15/22 0545 (!) 118/58 98.6 °F (37 °C) Oral 59 17 -- --   04/14/22 1929 (!) 149/66 98.7 °F (37.1 °C) Oral 71 18 94 % --       Intake/Output Summary (Last 24 hours) at 4/15/2022 1003  Last data filed at 4/15/2022 0926  Gross per 24 hour   Intake 50 ml   Output 198 ml   Net -148 ml       General: Awake, alert, in distress due to back pain  Neck: No JVD noted  Lungs: Clear bilaterally upper, diminished to the bases bilaterally. Unlabored  CV: Regular rate and rhythm. No rub  Abd: Soft, nontender, nondistended. Active bowel sounds  Skin: Warm and dry.   No rash on exposed extremities  Ext: 1+ RUE edema (history of breast CA); left AV fistula  Neuro: Awake, answers questions appropriately    Data:    Recent Labs     04/13/22  0717 04/15/22  0456 04/15/22  0605   WBC 7.9 5.6 5.5   HGB 9.5* 9.6* 9.3*   HCT 32.6* 31.9* 31.7*   MCV 92.9 92.5 92.4    351 360     Recent Labs     04/13/22  0717 04/15/22  0456 04/15/22  0605    142 142   K 4.6 4.7 4.4    103 104   CO2 29 24 25   CREATININE 5.2* 5.1* 5.2*   BUN 32* 29* 30*   LABGLOM 10 10 10   GLUCOSE 72* 56* 56*   CALCIUM 8.8 8.9 8.9 PHOS  --  4.1  --    MG  --  2.4  --      Vit D, 25-Hydroxy   Date Value Ref Range Status   03/11/2022 28 (L) 30 - 100 ng/mL Final     Comment:     <20 ng/mL. ........... Darlynn Magic Deficient  20-30 ng/mL. ......... Darlynn Magic Insufficient   ng/mL. ........ Darlynn Magic Sufficient  >100 ng/mL. .......... Darlynn Magic Toxic       PTH   Date Value Ref Range Status   03/11/2022 241 (H) 15 - 65 pg/mL Final     Recent Labs     04/13/22  0717 04/15/22  0456 04/15/22  0605   ALT 22 31 31   AST 22 39* 38*   ALKPHOS 109* 103 104   BILITOT 0.3 0.4 0.4     Recent Labs     04/13/22  0717 04/15/22  0456 04/15/22  0605   LABALBU 3.1* 2.8* 2.9*     Ferritin   Date Value Ref Range Status   11/09/2017 334 ng/mL Final     Comment:     FERRITIN Reference Ranges:  Adult Males   20 - 60 yrars:    30 - 400 ng/mL  Adult females 17 - 60 years:    13 - 150 ng/mL  Adults greater than 60 years:   no established reference range  Pediatrics:                     no established reference range       Iron   Date Value Ref Range Status   11/09/2017 33 (L) 37 - 145 mcg/dL Final     TIBC   Date Value Ref Range Status   11/09/2017 222 (L) 250 - 450 mcg/dL Final     Vitamin B-12   Date Value Ref Range Status   02/17/2017 1802 (H) 211 - 946 pg/mL Final     Folate   Date Value Ref Range Status   02/17/2017 >20.0 4.8 - 24.2 ng/mL Final     Lab Results   Component Value Date    COLORU Yellow 11/07/2017    NITRU Negative 11/07/2017    GLUCOSEU Negative 11/07/2017    GLUCOSEU NEGATIVE 08/27/2011    KETUA Negative 11/07/2017    UROBILINOGEN 0.2 11/07/2017    BILIRUBINUR Negative 11/07/2017    BILIRUBINUR NEGATIVE 08/27/2011     No results found for: ALVINO, CREURRAN, MACREATRATIO, OSMOU    No components found for: URIC    No results found for: LIPIDPAN    Assessment and Plan:    1.   ESRD on HD  OP [facility] MWF first shift   via a left AV fistula  Continue HD MWF      2.  Back pain  S/p epidural nerve block during last hospital stay  Infectious disease following for possible osteo  Blood cultures no growth to date  For bone biopsy  Neurosurgery and ID following      3. Hypertension  BP goal<140/80  BP at goal  Follow on current regimen and with HD    4. Anemia  Hemoglobin target 10-12  Hemoglobin 9.5 today  Will start JAYSHREE tomorrow if hgb <10 tomorrow  Transfuse for hgb < 7  Follow     5. Secondary hyperparathyroidism of renal origin   on 3/11  Phos controlled  Follow    Yony Zhao MD        Department of Internal Medicine  Section of Nephrology  Dialysis Note        PROCEDURE:  Patient seen on hemodialysis at 10:04 AM    PHYSICIAN:  Jennifer White M.D., [facility]    INDICATION:  End-stage renal disease    RX:  See dialysis flowsheet for specifics on access, blood flow rate, dialysate baths, duration of dialysis, anticoagulation and other technical information.     COMMENTS:  Procedure in progress and tolerated       Yony Zhao MD, MD

## 2022-04-16 LAB
ALBUMIN SERPL-MCNC: 2.8 G/DL (ref 3.5–5.2)
ALP BLD-CCNC: 104 U/L (ref 35–104)
ALT SERPL-CCNC: 28 U/L (ref 0–32)
ANION GAP SERPL CALCULATED.3IONS-SCNC: 16 MMOL/L (ref 7–16)
AST SERPL-CCNC: 32 U/L (ref 0–31)
BILIRUB SERPL-MCNC: 0.4 MG/DL (ref 0–1.2)
BUN BLDV-MCNC: 20 MG/DL (ref 6–23)
CALCIUM SERPL-MCNC: 8.9 MG/DL (ref 8.6–10.2)
CHLORIDE BLD-SCNC: 100 MMOL/L (ref 98–107)
CO2: 23 MMOL/L (ref 22–29)
CREAT SERPL-MCNC: 3 MG/DL (ref 0.5–1)
GFR AFRICAN AMERICAN: 19
GFR NON-AFRICAN AMERICAN: 19 ML/MIN/1.73
GLUCOSE BLD-MCNC: 129 MG/DL (ref 74–99)
HCT VFR BLD CALC: 30 % (ref 34–48)
HEMOGLOBIN: 8.9 G/DL (ref 11.5–15.5)
MCH RBC QN AUTO: 27.2 PG (ref 26–35)
MCHC RBC AUTO-ENTMCNC: 29.7 % (ref 32–34.5)
MCV RBC AUTO: 91.7 FL (ref 80–99.9)
METER GLUCOSE: 124 MG/DL (ref 74–99)
METER GLUCOSE: 126 MG/DL (ref 74–99)
PDW BLD-RTO: 17.2 FL (ref 11.5–15)
PLATELET # BLD: 329 E9/L (ref 130–450)
PMV BLD AUTO: 9.6 FL (ref 7–12)
POTASSIUM SERPL-SCNC: 4.1 MMOL/L (ref 3.5–5)
RBC # BLD: 3.27 E12/L (ref 3.5–5.5)
SODIUM BLD-SCNC: 139 MMOL/L (ref 132–146)
TOTAL PROTEIN: 6 G/DL (ref 6.4–8.3)
WBC # BLD: 5.2 E9/L (ref 4.5–11.5)

## 2022-04-16 PROCEDURE — 82962 GLUCOSE BLOOD TEST: CPT

## 2022-04-16 PROCEDURE — 36415 COLL VENOUS BLD VENIPUNCTURE: CPT

## 2022-04-16 PROCEDURE — 85027 COMPLETE CBC AUTOMATED: CPT

## 2022-04-16 PROCEDURE — S5553 INSULIN LONG ACTING 5 U: HCPCS | Performed by: INTERNAL MEDICINE

## 2022-04-16 PROCEDURE — 2580000003 HC RX 258: Performed by: ANESTHESIOLOGY

## 2022-04-16 PROCEDURE — 2060000000 HC ICU INTERMEDIATE R&B

## 2022-04-16 PROCEDURE — 99233 SBSQ HOSP IP/OBS HIGH 50: CPT | Performed by: INTERNAL MEDICINE

## 2022-04-16 PROCEDURE — 80053 COMPREHEN METABOLIC PANEL: CPT

## 2022-04-16 PROCEDURE — 6370000000 HC RX 637 (ALT 250 FOR IP): Performed by: INTERNAL MEDICINE

## 2022-04-16 PROCEDURE — 6360000002 HC RX W HCPCS: Performed by: INTERNAL MEDICINE

## 2022-04-16 RX ADMIN — FENTANYL CITRATE 25 MCG: 50 INJECTION, SOLUTION INTRAMUSCULAR; INTRAVENOUS at 11:55

## 2022-04-16 RX ADMIN — SODIUM CHLORIDE, PRESERVATIVE FREE 10 ML: 5 INJECTION INTRAVENOUS at 20:36

## 2022-04-16 RX ADMIN — ALLOPURINOL 100 MG: 100 TABLET ORAL at 10:50

## 2022-04-16 RX ADMIN — LATANOPROST 1 DROP: 50 SOLUTION OPHTHALMIC at 20:43

## 2022-04-16 RX ADMIN — ATORVASTATIN CALCIUM 80 MG: 40 TABLET, FILM COATED ORAL at 20:36

## 2022-04-16 RX ADMIN — HYDRALAZINE HYDROCHLORIDE 10 MG: 10 TABLET, FILM COATED ORAL at 10:50

## 2022-04-16 RX ADMIN — GABAPENTIN 200 MG: 100 CAPSULE ORAL at 16:22

## 2022-04-16 RX ADMIN — OXYCODONE AND ACETAMINOPHEN 1 TABLET: 5; 325 TABLET ORAL at 08:32

## 2022-04-16 RX ADMIN — ISOSORBIDE MONONITRATE 30 MG: 30 TABLET, EXTENDED RELEASE ORAL at 10:50

## 2022-04-16 RX ADMIN — NEPHROCAP 1 MG: 1 CAP ORAL at 20:36

## 2022-04-16 RX ADMIN — PANTOPRAZOLE SODIUM 40 MG: 40 TABLET, DELAYED RELEASE ORAL at 05:23

## 2022-04-16 RX ADMIN — SODIUM CHLORIDE, PRESERVATIVE FREE 10 ML: 5 INJECTION INTRAVENOUS at 10:51

## 2022-04-16 RX ADMIN — BRIMONIDINE TARTRATE 1 DROP: 2 SOLUTION/ DROPS OPHTHALMIC at 10:51

## 2022-04-16 RX ADMIN — OXYCODONE AND ACETAMINOPHEN 1 TABLET: 5; 325 TABLET ORAL at 23:14

## 2022-04-16 RX ADMIN — OXYCODONE AND ACETAMINOPHEN 1 TABLET: 5; 325 TABLET ORAL at 16:22

## 2022-04-16 RX ADMIN — TIMOLOL MALEATE 1 DROP: 5 SOLUTION OPHTHALMIC at 20:44

## 2022-04-16 RX ADMIN — TIMOLOL MALEATE 1 DROP: 5 SOLUTION OPHTHALMIC at 10:51

## 2022-04-16 RX ADMIN — INSULIN GLARGINE-YFGN 5 UNITS: 100 INJECTION, SOLUTION SUBCUTANEOUS at 20:39

## 2022-04-16 RX ADMIN — SODIUM CHLORIDE, PRESERVATIVE FREE 10 ML: 5 INJECTION INTRAVENOUS at 11:55

## 2022-04-16 RX ADMIN — LANTHANUM CARBONATE 1000 MG: 500 TABLET, CHEWABLE ORAL at 10:50

## 2022-04-16 RX ADMIN — METOPROLOL SUCCINATE 25 MG: 25 TABLET, EXTENDED RELEASE ORAL at 10:49

## 2022-04-16 RX ADMIN — GABAPENTIN 200 MG: 100 CAPSULE ORAL at 10:50

## 2022-04-16 RX ADMIN — LANTHANUM CARBONATE 1000 MG: 500 TABLET, CHEWABLE ORAL at 17:47

## 2022-04-16 RX ADMIN — GABAPENTIN 200 MG: 100 CAPSULE ORAL at 20:36

## 2022-04-16 RX ADMIN — BRIMONIDINE TARTRATE 1 DROP: 2 SOLUTION/ DROPS OPHTHALMIC at 20:44

## 2022-04-16 RX ADMIN — HYDRALAZINE HYDROCHLORIDE 10 MG: 10 TABLET, FILM COATED ORAL at 17:47

## 2022-04-16 ASSESSMENT — PAIN DESCRIPTION - FREQUENCY
FREQUENCY: CONTINUOUS

## 2022-04-16 ASSESSMENT — PAIN DESCRIPTION - PAIN TYPE
TYPE: ACUTE PAIN;CHRONIC PAIN

## 2022-04-16 ASSESSMENT — PAIN DESCRIPTION - ORIENTATION
ORIENTATION: RIGHT

## 2022-04-16 ASSESSMENT — PAIN SCALES - GENERAL
PAINLEVEL_OUTOF10: 4
PAINLEVEL_OUTOF10: 10
PAINLEVEL_OUTOF10: 0
PAINLEVEL_OUTOF10: 8
PAINLEVEL_OUTOF10: 8
PAINLEVEL_OUTOF10: 7
PAINLEVEL_OUTOF10: 0
PAINLEVEL_OUTOF10: 4

## 2022-04-16 ASSESSMENT — PAIN DESCRIPTION - LOCATION
LOCATION: BACK;LEG

## 2022-04-16 ASSESSMENT — PAIN DESCRIPTION - PROGRESSION
CLINICAL_PROGRESSION: NOT CHANGED

## 2022-04-16 ASSESSMENT — PAIN - FUNCTIONAL ASSESSMENT
PAIN_FUNCTIONAL_ASSESSMENT: PREVENTS OR INTERFERES SOME ACTIVE ACTIVITIES AND ADLS

## 2022-04-16 ASSESSMENT — PAIN DESCRIPTION - ONSET
ONSET: ON-GOING

## 2022-04-16 ASSESSMENT — PAIN DESCRIPTION - DESCRIPTORS
DESCRIPTORS: CONSTANT;DISCOMFORT;SHOOTING

## 2022-04-16 NOTE — PROGRESS NOTES
Nephrology Progress Note  4/16/2022 4:53 PM  Subjective:   Admit Date: 4/13/2022  PCP: Raina Pinzon MD    Interval History: Complaining of back pain. Followed by ID neurosurgery and cardiac surgery. Considering going to [facility]. Tolerated hemodialysis yesterday via her patent left upper arm dialysis access. Not short of breath and not on oxygen    Diet: ADULT DIET; Regular; Low Sodium (2 gm); Low Potassium (Less than 3000 mg/day); Low Phosphorus (Less than 1000 mg)    Data:     Scheduled Meds:   sodium chloride flush  5-40 mL IntraVENous 2 times per day    atorvastatin  80 mg Oral Nightly    allopurinol  100 mg Oral Daily    b complex-C-folic acid  1 mg Oral Nightly    bumetanide  2 mg Oral Once per day on Sun Tue Thu    gabapentin  200 mg Oral TID    hydrALAZINE  10 mg Oral BID    insulin glargine-yfgn  5 Units SubCUTAneous Nightly    isosorbide mononitrate  30 mg Oral Daily    latanoprost  1 drop Right Eye Nightly    pantoprazole  40 mg Oral QAM AC    metoprolol succinate  25 mg Oral Daily    [Held by provider] ticagrelor  90 mg Oral BID    lanthanum  1,000 mg Oral TID WC    brimonidine  1 drop Right Eye BID    And    timolol  1 drop Right Eye BID     Continuous Infusions:   sodium chloride      dextrose       PRN Meds:sodium chloride flush, sodium chloride, fentanNYL, lidocaine-prilocaine, midodrine, acetaminophen, melatonin, polyethylene glycol, glucose, dextrose, glucagon (rDNA), dextrose, ondansetron, oxyCODONE-acetaminophen  I/O last 3 completed shifts: In: 790 [P.O.:720; I.V.:20]  Out: 198   I/O this shift:   In: 420 [P.O.:420]  Out: -     Intake/Output Summary (Last 24 hours) at 4/16/2022 1653  Last data filed at 4/16/2022 1430  Gross per 24 hour   Intake 670 ml   Output --   Net 670 ml     CBC:   Recent Labs     04/15/22  0456 04/15/22  0605 04/16/22  0523   WBC 5.6 5.5 5.2   HGB 9.6* 9.3* 8.9*    360 329     BMP:    Recent Labs     04/15/22  0456 04/15/22  1781 04/16/22  0523    142 139   K 4.7 4.4 4.1    104 100   CO2 24 25 23   BUN 29* 30* 20   CREATININE 5.1* 5.2* 3.0*   GLUCOSE 56* 56* 129*     Hepatic:   Recent Labs     04/15/22  0456 04/15/22  0605 04/16/22  0523   AST 39* 38* 32*   ALT 31 31 28   BILITOT 0.4 0.4 0.4   ALKPHOS 103 104 104     Protein/ Albumin:    Lab Results   Component Value Date    LABALBU 2.8 (L) 04/16/2022        Objective:     Vitals: BP (!) 120/41   Pulse 66   Temp 97.5 °F (36.4 °C) (Oral)   Resp 18   Ht 5' 10\" (1.778 m)   Wt 167 lb 8.8 oz (76 kg)   SpO2 96%   BMI 24.04 kg/m²   General appearance: Awake, alert and oriented not in distress and not on oxygen  Lungs: Good air movement  Heart: No S3, No rub  Abdomen: Lax, soft No tenderness  L. Extremities: No edema  Patent left upper arm access    Assessment & Plan:     End-stage kidney disease: Continue hemodialysis Monday Wednesday and Friday via a patent and well-developed left upper arm fistula    Controlled hypertension with chronic kidney disease    Anemia with chronic kidney disease: Transfuse as needed    Back pain: Neurosurgery and ID inputs noted      This note was created using voice recognition software.   Electronically signed by Jack Ford MD on 4/16/2022 at 4:53 PM

## 2022-04-16 NOTE — PROGRESS NOTES
Hospitalist Progress Note      SYNOPSIS: Patient admitted on 2022 for Unable to ambulate has a past medical history that includes spinal stenosis, ESRD on dialysis, chronic anemia, hypertension, hyperlipidemia, diabetes mellitus, gout, coronary artery disease, mitral valve papillary fibroblastoma.     Claudia presented to the ER with back pain and issues with ambulation. She states she was unable to get out of bed to go to dialysis today. The patient has significant heart disease including valvular disease that anesthesiology has stated as a barrier to her doing surgery on her back.  Unfortunately, until she is able to ambulate effectively for post surgical rehab, the patient cannot have corrective surgery on her heart either.  Due to this difficult position, the patient was treated about a month ago with a nerve block of her back which was effective for about a month and she was able to do physical therapy. She was recently discharged from SNF on 4/3. However today pain caused patient to be unable to go to dialysis today. She denies any bowel or bladder incontinence.     CT of the lumbar spine showed new loss of height in L1 inferior endplate with increased soft tissue inflammation. Findings suspicious for possible osteomyelitis.     Patient has spinal cord stimulator so is unable to have MRI      SUBJECTIVE:  Stable overnight. No other overnight issues reported. Patient seen and examined  Records reviewed. Discussed with neurosurgery  Awaiting if anesthesia will clear for procedure        Temp (24hrs), Av.2 °F (36.8 °C), Min:98 °F (36.7 °C), Max:98.4 °F (36.9 °C)    DIET: ADULT DIET; Regular; Low Sodium (2 gm); Low Potassium (Less than 3000 mg/day);  Low Phosphorus (Less than 1000 mg)  CODE: Full Code    Intake/Output Summary (Last 24 hours) at 2022 1109  Last data filed at 2022 0547  Gross per 24 hour   Intake 490 ml   Output --   Net 490 ml       Review of Systems  All bolded are positive; please see HPI  General:  Fever, chills, diaphoresis, fatigue, malaise, night sweats, weight loss  Psychological:  Anxiety, disorientation, hallucinations. ENT:  Epistaxis, headaches, vertigo, visual changes. Cardiovascular:  Chest pain, irregular heartbeats, palpitations, paroxysmal nocturnal dyspnea. Respiratory:  Shortness of breath, coughing, sputum production, hemoptysis, wheezing, orthopnea. Gastrointestinal:  Nausea, vomiting, diarrhea, heartburn, constipation, abdominal pain, hematemesis, hematochezia, melena, acholic stools  Genito-Urinary:  Dysuria, urgency, frequency, hematuria  Musculoskeletal:  Joint pain, joint stiffness, joint swelling, muscle pain back pain  Neurology:  Headache, focal neurological deficits, weakness, numbness, paresthesia  Derm:  Rashes, ulcers, excoriations, bruising  Extremities:  Decreased ROM, peripheral edema, mottling      OBJECTIVE:    BP (!) 118/52   Pulse 68   Temp 98 °F (36.7 °C) (Oral)   Resp 16   Ht 5' 10\" (1.778 m)   Wt 167 lb 8.8 oz (76 kg)   SpO2 95%   BMI 24.04 kg/m²     General appearance:  awake, alert, and oriented to person, place, time, and purpose; appears stated age and cooperative; no apparent distress no labored breathing  HEENT:  Conjunctivae/corneas clear. Neck: Supple. No jugular venous distention. Respiratory: symmetrical; clear to auscultation bilaterally; no wheezes; no rhonchi; no rales  Cardiovascular: rhythm regular; rate controlled; no murmurs  Abdomen: Soft, nontender, nondistended  Extremities:  peripheral pulses present; no peripheral edema; no ulcers  Musculoskeletal: No clubbing, cyanosis, no bilateral lower extremity edema. Brisk capillary refill. Skin:  No rashes  on visible skin  Neurologic: awake, alert and following commands     ASSESSMENT and PLAN:  · Back pain with spinal stenosis and possible osteomyelitis of lumbar spine -Neurosurgery consultation. Pain control. ID consulted.   Patient was unable to get MRI due to stimulator. Awaiting surgical clearance  · CAD- with h/o LAD and RCA stents 5/2021 and with cath 12/21 showing patent LAD stent, high grade focal in stent restenosis of the RCA and known  of OM2. Was supposed to have CABG x 2 with resection of the MV fibroelastoma and possible mitral valve repair, however developed osteo of finger so this was delayed. Cannot be percutaneously revascularized prior to back surgery since she will need to be on DAPT if that is done and which will need to be stopped prior to spinal surgery. Resume aspirin and Brilinta ASAP after surgery per neurosurgeries discretion. Will need to follow with CTS OP  · Mitral valve Papillary fibroelastoma   · End-stage renal disease on hemodialysis- Monday Wednesday Friday, consult nephrology for renal replacement therapy. · Chronic anemia secondary to end-stage renal disease: Monitor H&H  · Essential hypertension: Continue outpatient medications with hydralazine and metoprolol.   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  · Jehovah Witness     Discussed with neurosurgery  Awaiting if anesthesia will clear for procedure      Medications:  REVIEWED DAILY    Infusion Medications    sodium chloride      dextrose       Scheduled Medications    sodium chloride flush  5-40 mL IntraVENous 2 times per day    atorvastatin  80 mg Oral Nightly    allopurinol  100 mg Oral Daily    b complex-C-folic acid  1 mg Oral Nightly    bumetanide  2 mg Oral Once per day on Sun Tue Thu    gabapentin  200 mg Oral TID    hydrALAZINE  10 mg Oral BID    insulin glargine-yfgn  5 Units SubCUTAneous Nightly    isosorbide mononitrate  30 mg Oral Daily    latanoprost  1 drop Right Eye Nightly    pantoprazole  40 mg Oral QAM AC    metoprolol succinate  25 mg Oral Daily    [Held by provider] ticagrelor  90 mg Oral BID    lanthanum  1,000 mg Oral TID WC    brimonidine  1 drop Right Eye BID    And    timolol  1 drop Right Eye BID     PRN Meds: sodium chloride flush, sodium chloride, fentanNYL, lidocaine-prilocaine, midodrine, acetaminophen, melatonin, polyethylene glycol, glucose, dextrose, glucagon (rDNA), dextrose, ondansetron, oxyCODONE-acetaminophen    Labs:     Recent Labs     04/15/22  0456 04/15/22  0605 04/16/22  0523   WBC 5.6 5.5 5.2   HGB 9.6* 9.3* 8.9*   HCT 31.9* 31.7* 30.0*    360 329       Recent Labs     04/15/22  0456 04/15/22  0605 04/16/22  0523    142 139   K 4.7 4.4 4.1    104 100   CO2 24 25 23   BUN 29* 30* 20   CREATININE 5.1* 5.2* 3.0*   CALCIUM 8.9 8.9 8.9   PHOS 4.1  --   --        Recent Labs     04/15/22  0456 04/15/22  0605 04/16/22  0523   PROT 6.0* 6.2* 6.0*   ALKPHOS 103 104 104   ALT 31 31 28   AST 39* 38* 32*   BILITOT 0.4 0.4 0.4       No results for input(s): INR in the last 72 hours. No results for input(s): Leena Beat in the last 72 hours. Chronic labs:    Lab Results   Component Value Date    CHOL 100 04/15/2022    TRIG 107 04/15/2022    HDL 27 04/15/2022    LDLCALC 52 04/15/2022    TSH 1.500 04/15/2022    INR 1.0 01/20/2022    LABA1C 5.5 04/15/2022       Radiology: REVIEWED DAILY    +++++++++++++++++++++++++++++++++++++++++++++++++  DO Nick Hicks Physician - [address] [facility], [place]  +++++++++++++++++++++++++++++++++++++++++++++++++  NOTE: This report was transcribed using voice recognition software. Every effort was made to ensure accuracy; however, inadvertent computerized transcription errors may be present.

## 2022-04-16 NOTE — PROGRESS NOTES
Infectious Disease  Progress Note  [facility]    Chief Complaint: R/O  lumbar osteomyelitis    Subjective:  Reports low back pain, denies bowel or bladder incontinence   Afebrile       Scheduled Meds:   sodium chloride flush  5-40 mL IntraVENous 2 times per day    atorvastatin  80 mg Oral Nightly    allopurinol  100 mg Oral Daily    b complex-C-folic acid  1 mg Oral Nightly    bumetanide  2 mg Oral Once per day on Sun Tue Thu    gabapentin  200 mg Oral TID    hydrALAZINE  10 mg Oral BID    insulin glargine-yfgn  5 Units SubCUTAneous Nightly    isosorbide mononitrate  30 mg Oral Daily    latanoprost  1 drop Right Eye Nightly    pantoprazole  40 mg Oral QAM AC    metoprolol succinate  25 mg Oral Daily    [Held by provider] ticagrelor  90 mg Oral BID    lanthanum  1,000 mg Oral TID WC    brimonidine  1 drop Right Eye BID    And    timolol  1 drop Right Eye BID     Continuous Infusions:   sodium chloride      dextrose       PRN Meds:sodium chloride flush, sodium chloride, fentanNYL, lidocaine-prilocaine, midodrine, acetaminophen, melatonin, polyethylene glycol, glucose, dextrose, glucagon (rDNA), dextrose, ondansetron, oxyCODONE-acetaminophen    ROS:  As mentioned in subjective, all other systems negative      BP (!) 118/52   Pulse 68   Temp 98 °F (36.7 °C) (Oral)   Resp 16   Ht 5' 10\" (1.778 m)   Wt 167 lb 8.8 oz (76 kg)   SpO2 95%   BMI 24.04 kg/m²     Physical Exam  Constitutional: The patient is awake, alert, and oriented. Skin: Warm and dry. No jaundice. HEENT: Eyes show round, and reactive pupils. Neck: Supple to movements. No lymphadenopathy. Chest: Clear to auscultation posteriorly  Cardiovascular: S1 and S2 are rhythmic and regular. No murmurs appreciated. Abdomen: Soft nontender  Extremities: No clubbing, no cyanosis, no edema.   Musculoskeletal:  No edema, strength good, AV fistula ok   Tender L spine   Neurological: Alert and oriented x 3,   Lines: peripheral    CBC with Differential:      Lab Results   Component Value Date    WBC 5.2 04/16/2022    RBC 3.27 04/16/2022    HGB 8.9 04/16/2022    HCT 30.0 04/16/2022     04/16/2022    MCV 91.7 04/16/2022    MCH 27.2 04/16/2022    MCHC 29.7 04/16/2022    RDW 17.2 04/16/2022    NRBC 0.0 02/18/2017    SEGSPCT 70 03/12/2014    BANDSPCT 1 02/18/2015    LYMPHOPCT 26.8 04/15/2022    PROMYELOPCT 1.8 02/18/2017    MONOPCT 16.6 04/15/2022    MYELOPCT 0.9 10/24/2017    BASOPCT 0.7 04/15/2022    MONOSABS 0.91 04/15/2022    LYMPHSABS 1.47 04/15/2022    EOSABS 0.18 04/15/2022    BASOSABS 0.04 04/15/2022       CMP:    Lab Results   Component Value Date     04/16/2022    K 4.1 04/16/2022    K 4.6 04/13/2022     04/16/2022    CO2 23 04/16/2022    BUN 20 04/16/2022    CREATININE 3.0 04/16/2022    GFRAA 19 04/16/2022    LABGLOM 19 04/16/2022    GLUCOSE 129 04/16/2022    GLUCOSE 46 04/02/2012    PROT 6.0 04/16/2022    LABALBU 2.8 04/16/2022    LABALBU 3.8 04/02/2012    CALCIUM 8.9 04/16/2022    BILITOT 0.4 04/16/2022    ALKPHOS 104 04/16/2022    AST 32 04/16/2022    ALT 28 04/16/2022       Hepatic Function Panel:    Lab Results   Component Value Date    ALKPHOS 104 04/16/2022    ALT 28 04/16/2022    AST 32 04/16/2022    PROT 6.0 04/16/2022    BILITOT 0.4 04/16/2022    BILIDIR <0.2 05/25/2021    IBILI see below 05/25/2021    LABALBU 2.8 04/16/2022    LABALBU 3.8 04/02/2012         Microbiology :    Blood culture - neg to date       Radiology :      CT scan of L spine:    Impression:        New loss of height of L1 inferior endplate with increased soft tissue   inflammation.  Finding is suspicious for possible osteomyelitis.  Further   evaluation with lumbar spine MRI may be considered.                Assessment:  · R/o Lumbar spine osteomyelitis: History of prior back surgery and recent epidural nerve block.   · Pending cardiology and CT surgery clearance for anesthesia for bone biopsy.      Plan:    · Pending bone biopsy   · Awaiting clearance for cardiology and CT surgery. · Will follow with you.      Electronically signed by Audrey Guzman MD on 4/16/2022 at 8:29 AM

## 2022-04-16 NOTE — PROGRESS NOTES
Cardiac surgery consult noted. Await cardiology input. The biopsy could be done under [facility] .

## 2022-04-16 NOTE — PROGRESS NOTES
Inpatient Cardiology Consultation follow-up visit    Reason for Consult:  Pre-op risk assessment prior to biopsy     Consulting Physician: Dr. Vivien Melton     Requesting Physician:  Dr. Judie Jean-Baptiste     Date of Consultation: 4/16/2022    Subjective: I had a lengthy discussion with patient this morning. I mentioned to her that she is at higher risk but not prohibitive risk for surgery since she has multivessel coronary artery disease that has not yet been addressed for which CABG was recommended but not yet done. Patient was asking if there were any surgical approach that may decrease her surgical risk  and I mentioned to the patient to discuss this with her surgeon. From cardiovascular perspective we will continue on perioperative beta-blocker and statin. Past Medical History:    1. Jehovah Witness  2. 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 3. HTN  4. HLD  5. BMI 27.1 on 5/19/2021  6. Anemia  7. Bladder carcinoma s/p chemotherapy  8. Right breast cancer with mastectomy  2005 followed by radiation and chemotherapy, Arimidex. Chronic  lymphedema  RUE. Cat Carbajal states became a therapy because of multiple back issues and weakened her bones,  port in her right upper chest  9. [facility] admission with SOB and chest discomfort 08/2011.  Cardiac enzymes negative.  CT angiogram of the chest negative for PE and dissection.  EKG:  NSR, nonspecific ST T abnormalities.    10. TLY 86/5589 with severe concentric LVH, normal wall motion.  LAE, RVE, Stage I diastolic dysfunction. 11. MPS 08/2011 with small area of lateral ischemia, normal wall motion and EF.    12. Cardiac catheterization 08/30/2012:  CX 90% mid stenosis, OM2 totally occluded.  LAD 20% stenosis, RVA 50% stenosis, LV normal.  She was treated medically.    13. [facility] admission 02/13/2013 with SOB, orthopnea, nonproductive nocturnal cough and edema.  CXR:  cardiomegaly and bilateral effusion.  EKG:  NSR, LAE, low voltage, PVC, poor R wave progression.   BUN 23, Cr 1.5, BNP 1868.  Cardiac enzymes negative.  Hemoglobin 9.9, WBC 5.4.  Rx with aggressive diuretic therapy.    14. Echo 02/14/2013: [address], normal LV systolic function, Stage II diastolic dysfunction, LAE, normal RVSP, no valvulopathy. 15. Lifetime non-smoker. 16. NKDA.  She had worsening renal function in the past when taking lasix. 17. Echo, 07/17/2013. Mild concentric LVH with normal systolic function. RVSP 53 mmHg. Otherwise normal study. 18. [facility] admission with worsening back pain and inability to walk on 09/22/2014 associated with mild SOB. CXR: Pulmonary vascular congestion. EKG: NSR, possible old inferior MI. Electrolytes normal, BUN=30, Cr=2.0, ZCD=71675, Hb=11.1, WBC=5.9, cardiac enzymes negative. 19. Echo, 09/23/2014. Moderate CLVH, normal wall motion, Stage II diastolic dysfunction, RVE, OFELIA, RVSP=50 mmHg. 20. Surgery, Dr. Yuridia Alberto, 09/25/2014. Decompressive lumbar laminectomy L2-L5 with fusion L3-4 and L4-5. 21. Insertion intrathecal drain, 10/04/2014 for CSF leak, removed several days later, but reinserted 10/12/2014 for recurrent leak. 22. Implant spinal cord stimulator, Penta lead from [facility] by thoracic laminotomy, T10.   Implant IPG Protege in the right buttock  23. TTE 11/2017, EF 55%.  Mild LVH.  Stage II diastolic dysfunction.  Moderate to severe left atrial dilatation.  Mild MR.  RVSP 76.  24. 2017 ESRD dialysis initiated  25. Abdominal CT 2017: Extensive anasarca.  Extensive arteriosclerotic disease.   26. Surgical history: Correction of Charcot joint right, carpal tunnel release 3/2020, cholecystectomy, dialysis fistula creation, mastectomy, laser treatment of eyes, spinal cord stimulator, surgery for retinal detachment.  Spinal cord decompression L2.  27. 11/2017 Insertion of right internal jugular vein tunneled hemodialysis catheter fluoroscopy Removal of right femoral temporary hemodialysis catheter  28. 1/31/2018 Creation of L brachiocephalic AVF  29. 2/84/3182 ANNA of left upper extremity brachiobasilic  fistula, stage II  30. 9/86/4162 [id number] of L basilic AVF  31. 52/48/0770  TTE: EF 60-65%. Moderate CLVH. No VHD. 32. Ambulates with walker  33. Completed COVID Vaccine  29. 2D echocardiogram [facility] December 8, 2020 EF 60 to 52% and diastolic dysfunction with moderate concentric left ventricular hypertrophy and moderate MAC and aortic valve sclerosis  35. Hospital admission May 2021 with a reported shockable rhythm by AED and received 1 defibrillation and had reported ventricular tachycardia in the emergency room and was started on a lidocaine drip, elevated troponin but no acute ischemic EKG changes, started on aspirin and Lipitor resumed and beta-blockers and nitrates initiated  36. Lexiscan stress test May 19, 2021, abnormal with a large fixed defect in the apical and septal wall small fixed defect in the inferior apical wall partially reversible defect in the small area of the anterior lateral wall and EF 25% with apical septal akinesis and moderate global hypokinesis and dilated left ventricle during stress and rest and was a high risk study  37. Left heart cath May 2021 she underwent stenting to the LAD and RCA. CONCLUSION: Coronary artery disease: Left main:  20% eccentric mid vessel narrowing. LAD:  50% proximal vessel narrowing and 95% mid vessel stenosis. Trivial diagonal branch with 90% stenosis. LCX:  Occluded after a small caliber first marginal branch which is a chronic total occlusion.  The second larger marginal branch filled late. RCA:  Large, dominant vessel with 90% heavily calcified mid vessel stenosis.  50% disease at the level of the crux and 70% ostial stenosis of the posterior descending artery branch. Markedly elevated left ventricular end-diastolic pressure. Successful balloon angioplasty with deployment of drug-eluting coronary stent to the mid LAD with very good results.  Successful balloon angioplasty with deployment of drug-eluting coronary stent to the mid RCA with poststent deployment dilatation with a larger high-pressure noncompliant balloon with good results. 38. Lasix allergy with questionable renal failure   39. 2D echocardiogram on May 21, 2021 left ventricle is dilated There is apical, septal and basal inferior wall severe hypokinesis to akinesis Ejection fraction is visually estimated at 30+/-5%. There is doppler evidence of stage III diastolic dysfunction. Normal right ventricular size. Right ventricle global systolic function is mildly reduced . The left atrium is moderately dilated. Severe posterior mitral annular calcification. Focal calcification mitral valve leaflets. Chordal calcification is present. A mobile well defined 1.0 cm by 0.5 cm echo density is noted on the ventricular aspect of the mitral valve suggestive of a fibroelastoma: clinical correlation is needed. No mitral stenosis. Mild mitral regurgitation. Mild to moderate tricuspid regurgitation. Moderate pulmonary hypertension. Aortic root is sclerotic and calcified.   40. 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 6 cm x .8cm  41. KIARA November 11, 2021 for mitral leaflet mass Normal LV function, normal RV function, no hemodynamically significant valve disease(mobile posterior leaflet echodensity with leaflet restriction and mild MR), no evidence of vegetations, no left atrial or left atrial appendage thrombus (no evidence of spontaneous echo contrast), no intraatrial shunting on bubble study, no significant atherosclerosis of the aorta  42. Left heart cath December 9, 2021 ANGIOGRAPHIC FINDINGS: The left main coronary artery arises normally from the left sinus of Valsalva.  It is a large vessel without any disease. Beth Campbell is mild distal calcification.  It bifurcates into left anterior descending artery and left circumflex artery. The left anterior descending artery has moderate-to-severe proximal and ostial calcifications. midodrine (PROAMATINE) 5 MG tablet Take 5 mg by mouth daily as needed   Yes Historical Provider, MD   gabapentin (NEURONTIN) 100 MG capsule Take 2 capsules by mouth 3 times daily for 180 days.  3/24/22 9/20/22  Berniece Bamberger., MD   atorvastatin (LIPITOR) 80 MG tablet Take 80 mg by mouth nightly    Historical Provider, MD   bumetanide (BUMEX) 2 MG tablet Take 2 mg by mouth three times a week Given Sunday,Tuesday,Thursday    Historical Provider, MD   isosorbide mononitrate (IMDUR) 30 MG extended release tablet Take 30 mg by mouth daily    Historical Provider, MD   insulin glargine (LANTUS) 100 UNIT/ML injection vial Inject 5 Units into the skin nightly     Historical Provider, MD   lidocaine-prilocaine (EMLA) 2.5-2.5 % cream Apply topically as needed for Pain     Historical Provider, MD   hydrALAZINE (APRESOLINE) 10 MG tablet Take 1 tablet by mouth 2 times daily 2/3/22   Stacie Rowan MD   metoprolol succinate (TOPROL XL) 25 MG extended release tablet Take 1 tablet by mouth daily 11/24/21   Stacie Rowan MD   lanthanum (FOSRENOL) 1000 MG chewable tablet Take 1,000 mg by mouth 3 times daily (with meals)  8/9/21   Historical Provider, MD   allopurinol (ZYLOPRIM) 100 MG tablet Take 1 tablet by mouth daily 9/7/21   Stacie Rowan MD   ticagrelor (BRILINTA) 90 MG TABS tablet Take 1 tablet by mouth 2 times daily 9/7/21   Stacie Rowan MD   B Complex-C-Folic Acid (BONI CAPS) 1 MG CAPS Take 1 mg by mouth nightly     Historical Provider, MD   omeprazole (PRILOSEC) 20 MG delayed release capsule Take 20 mg by mouth daily as needed     Historical Provider, MD MCHUGH 0.01 % SOLN ophthalmic drops Place 1 drop into the right eye nightly  6/9/20   Historical Provider, MD   COMBIGAN 0.2-0.5 % ophthalmic solution Place 1 drop into the right eye every 12 hours  8/1/19   Historical Provider, MD       Current Medications:    Current Facility-Administered Medications: sodium chloride flush 0.9 % injection 5-40 mL, 5-40 mL, IntraVENous, 2 times per day  sodium chloride flush 0.9 % injection 5-40 mL, 5-40 mL, IntraVENous, PRN  0.9 % sodium chloride infusion, , IntraVENous, PRN  fentaNYL (SUBLIMAZE) injection 25 mcg, 25 mcg, IntraVENous, Q1H PRN  atorvastatin (LIPITOR) tablet 80 mg, 80 mg, Oral, Nightly  allopurinol (ZYLOPRIM) tablet 100 mg, 100 mg, Oral, Daily  b complex-C-folic acid (NEPHROCAPS) capsule 1 mg, 1 mg, Oral, Nightly  bumetanide (BUMEX) tablet 2 mg, 2 mg, Oral, Once per day on Sun Tue Thu  gabapentin (NEURONTIN) capsule 200 mg, 200 mg, Oral, TID  hydrALAZINE (APRESOLINE) tablet 10 mg, 10 mg, Oral, BID  insulin glargine-yfgn (SEMGLEE-YFGN) injection vial 5 Units, 5 Units, SubCUTAneous, Nightly  isosorbide mononitrate (IMDUR) extended release tablet 30 mg, 30 mg, Oral, Daily  lidocaine-prilocaine (EMLA) cream, , Topical, PRN  latanoprost (XALATAN) 0.005 % ophthalmic solution 1 drop, 1 drop, Right Eye, Nightly  midodrine (PROAMATINE) tablet 5 mg, 5 mg, Oral, Daily PRN  pantoprazole (PROTONIX) tablet 40 mg, 40 mg, Oral, QAM AC  metoprolol succinate (TOPROL XL) extended release tablet 25 mg, 25 mg, Oral, Daily  [Held by provider] ticagrelor (BRILINTA) tablet 90 mg, 90 mg, Oral, BID  acetaminophen (TYLENOL) tablet 650 mg, 650 mg, Oral, Q4H PRN  melatonin tablet 3 mg, 3 mg, Oral, Nightly PRN  polyethylene glycol (GLYCOLAX) packet 17 g, 17 g, Oral, Daily PRN  glucose (GLUTOSE) 40 % oral gel 15 g, 15 g, Oral, PRN  dextrose 50 % IV solution, 12.5 g, IntraVENous, PRN  glucagon (rDNA) injection 1 mg, 1 mg, IntraMUSCular, PRN  dextrose 5 % solution, 100 mL/hr, IntraVENous, PRN  lanthanum (FOSRENOL) chewable tablet 1,000 mg, 1,000 mg, Oral, TID WC  ondansetron (ZOFRAN) injection 4 mg, 4 mg, IntraVENous, Q6H PRN  brimonidine (ALPHAGAN) 0.2 % ophthalmic solution 1 drop, 1 drop, Right Eye, BID **AND** timolol (TIMOPTIC) 0.5 % ophthalmic solution 1 drop, 1 drop, Right Eye, BID  oxyCODONE-acetaminophen (PERCOCET) 5-325 MG per tablet 1 tablet, 1 tablet, Oral, Q4H PRN    Allergies:  Furosemide (swelling / renal failure)     Social History:    Resides at home alone. Uses a walker for ambulation. Denies alcohol and illicit drug use. Lifelong nonsmoker. Family History:   Father: Hx of MI with stents (age 63's)     REVIEW OF SYSTEMS:     · Constitutional: + fatigue. Denies fevers, chills or night sweats  · Eyes: Denies visual changes or drainage  · ENT: Denies headaches or hearing loss. No mouth sores or sore throat. No epistaxis   · Cardiovascular: Denies chest pain, pressure or palpitations. No lower extremity swelling. · Respiratory: +FRENCH. Denies cough, orthopnea or PND. No hemoptysis   · Gastrointestinal: Denies hematemesis or anorexia. No hematochezia or melena    · Genitourinary: Denies urgency, dysuria or hematuria. · Musculoskeletal: See HPI. · Integumentary: Denies rash, hives or pruritis   · Neurological: Denies dizziness, headaches or seizures. No numbness or tingling  · Psychiatric: Denies anxiety or depression. · Endocrine: Denies temperature intolerance. No recent weight change. .  · Hematologic/Lymphatic: Denies abnormal bruising or bleeding. No swollen lymph nodes    PHYSICAL EXAM:   BP (!) 120/41   Pulse 66   Temp 97.5 °F (36.4 °C) (Oral)   Resp 18   Ht 5' 10\" (1.778 m)   Wt 167 lb 8.8 oz (76 kg)   SpO2 96%   BMI 24.04 kg/m²   CONST:  Well developed, well nourished middle aged AAF who appears of stated age. Awake, alert and cooperative. No apparent distress. HEENT:   Head- Normocephalic, atraumatic   Eyes- Conjunctivae pink, anicteric  Throat- Oral mucosa pink and moist  Neck-  No stridor, trachea midline, no jugular venous distention. No carotid bruit. CHEST: Chest symmetrical and non-tender to palpation. No accessory muscle use or intercostal retractions  RESPIRATORY: Lung sounds - diminished in bases. On RA.     CARDIOVASCULAR:     Heart Inspection- shows no noted pulsations  Heart Palpation- no lumbar spine MRI may be considered. ASSESSMENT:  1. Pre op cardiac risk stratification for bone biopsy under general anesthesia  . I had a lengthy discussion with patient this morning. I mentioned to her that she is at higher risk but not prohibitive risk for surgery since she has multivessel coronary artery disease that has not yet been addressed for which CABG was recommended but not yet done. Patient was asking if there were any surgical approach that may decrease her surgical risk  and I mentioned to the patient to discuss this with her surgeon. From cardiovascular perspective we will continue on perioperative beta-blocker and statin. 2. CAD with h/o LAD and RCA stents 5/2021 and with cath 12/21 showing patent LAD stent, hi grade focal in stent restenosis of the RCA and known  of OM2. Patient is scheduled for non-urgent CABG x 2 with resection of the MV fibroelastoma and possible mitral valve repair in the near future TBD). 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 Insulin requiring DM with neuropathy, retinopathy and nephropathy  5. ESRD on hemodialysis  6. Borderline low BP. H/o HTN  7. HLD  8. Jehovah Witness  9. Bladder carcinoma s/p chemotherapy  10. DDD lumbar spine with h/o lumbar laminectomy and spinal cord stimulator with intractable back pain  11. S/p amputation of left hand distal middle finger for osteomyelitis  12. Hypoalbuminemia   13. Chronic anemia.

## 2022-04-17 LAB
ALBUMIN SERPL-MCNC: 2.7 G/DL (ref 3.5–5.2)
ALP BLD-CCNC: 106 U/L (ref 35–104)
ALT SERPL-CCNC: 26 U/L (ref 0–32)
ANION GAP SERPL CALCULATED.3IONS-SCNC: 12 MMOL/L (ref 7–16)
AST SERPL-CCNC: 24 U/L (ref 0–31)
BILIRUB SERPL-MCNC: 0.3 MG/DL (ref 0–1.2)
BUN BLDV-MCNC: 31 MG/DL (ref 6–23)
CALCIUM SERPL-MCNC: 9.1 MG/DL (ref 8.6–10.2)
CHLORIDE BLD-SCNC: 100 MMOL/L (ref 98–107)
CO2: 24 MMOL/L (ref 22–29)
CREAT SERPL-MCNC: 4.8 MG/DL (ref 0.5–1)
GFR AFRICAN AMERICAN: 11
GFR NON-AFRICAN AMERICAN: 11 ML/MIN/1.73
GLUCOSE BLD-MCNC: 128 MG/DL (ref 74–99)
HCT VFR BLD CALC: 30.4 % (ref 34–48)
HEMOGLOBIN: 9 G/DL (ref 11.5–15.5)
MCH RBC QN AUTO: 27.4 PG (ref 26–35)
MCHC RBC AUTO-ENTMCNC: 29.6 % (ref 32–34.5)
MCV RBC AUTO: 92.7 FL (ref 80–99.9)
METER GLUCOSE: 104 MG/DL (ref 74–99)
METER GLUCOSE: 130 MG/DL (ref 74–99)
METER GLUCOSE: 133 MG/DL (ref 74–99)
PDW BLD-RTO: 17.4 FL (ref 11.5–15)
PLATELET # BLD: 321 E9/L (ref 130–450)
PMV BLD AUTO: 9.5 FL (ref 7–12)
POTASSIUM SERPL-SCNC: 4.3 MMOL/L (ref 3.5–5)
RBC # BLD: 3.28 E12/L (ref 3.5–5.5)
SODIUM BLD-SCNC: 136 MMOL/L (ref 132–146)
TOTAL PROTEIN: 6 G/DL (ref 6.4–8.3)
WBC # BLD: 4.5 E9/L (ref 4.5–11.5)

## 2022-04-17 PROCEDURE — 2580000003 HC RX 258: Performed by: ANESTHESIOLOGY

## 2022-04-17 PROCEDURE — S5553 INSULIN LONG ACTING 5 U: HCPCS | Performed by: INTERNAL MEDICINE

## 2022-04-17 PROCEDURE — 6370000000 HC RX 637 (ALT 250 FOR IP): Performed by: INTERNAL MEDICINE

## 2022-04-17 PROCEDURE — 2060000000 HC ICU INTERMEDIATE R&B

## 2022-04-17 PROCEDURE — 82962 GLUCOSE BLOOD TEST: CPT

## 2022-04-17 PROCEDURE — 97165 OT EVAL LOW COMPLEX 30 MIN: CPT

## 2022-04-17 PROCEDURE — 36415 COLL VENOUS BLD VENIPUNCTURE: CPT

## 2022-04-17 PROCEDURE — 80053 COMPREHEN METABOLIC PANEL: CPT

## 2022-04-17 PROCEDURE — 97161 PT EVAL LOW COMPLEX 20 MIN: CPT

## 2022-04-17 PROCEDURE — 97535 SELF CARE MNGMENT TRAINING: CPT

## 2022-04-17 PROCEDURE — 85027 COMPLETE CBC AUTOMATED: CPT

## 2022-04-17 PROCEDURE — 97530 THERAPEUTIC ACTIVITIES: CPT

## 2022-04-17 RX ADMIN — BRIMONIDINE TARTRATE 1 DROP: 2 SOLUTION/ DROPS OPHTHALMIC at 09:30

## 2022-04-17 RX ADMIN — METOPROLOL SUCCINATE 25 MG: 25 TABLET, EXTENDED RELEASE ORAL at 09:31

## 2022-04-17 RX ADMIN — BRIMONIDINE TARTRATE 1 DROP: 2 SOLUTION/ DROPS OPHTHALMIC at 20:11

## 2022-04-17 RX ADMIN — SODIUM CHLORIDE, PRESERVATIVE FREE 10 ML: 5 INJECTION INTRAVENOUS at 09:31

## 2022-04-17 RX ADMIN — NEPHROCAP 1 MG: 1 CAP ORAL at 20:10

## 2022-04-17 RX ADMIN — ALLOPURINOL 100 MG: 100 TABLET ORAL at 09:31

## 2022-04-17 RX ADMIN — SODIUM CHLORIDE, PRESERVATIVE FREE 10 ML: 5 INJECTION INTRAVENOUS at 20:11

## 2022-04-17 RX ADMIN — TIMOLOL MALEATE 1 DROP: 5 SOLUTION OPHTHALMIC at 09:30

## 2022-04-17 RX ADMIN — PANTOPRAZOLE SODIUM 40 MG: 40 TABLET, DELAYED RELEASE ORAL at 06:09

## 2022-04-17 RX ADMIN — ATORVASTATIN CALCIUM 80 MG: 40 TABLET, FILM COATED ORAL at 20:11

## 2022-04-17 RX ADMIN — OXYCODONE AND ACETAMINOPHEN 1 TABLET: 5; 325 TABLET ORAL at 10:58

## 2022-04-17 RX ADMIN — OXYCODONE AND ACETAMINOPHEN 1 TABLET: 5; 325 TABLET ORAL at 06:20

## 2022-04-17 RX ADMIN — HYDRALAZINE HYDROCHLORIDE 10 MG: 10 TABLET, FILM COATED ORAL at 09:31

## 2022-04-17 RX ADMIN — ISOSORBIDE MONONITRATE 30 MG: 30 TABLET, EXTENDED RELEASE ORAL at 09:31

## 2022-04-17 RX ADMIN — GABAPENTIN 200 MG: 100 CAPSULE ORAL at 20:10

## 2022-04-17 RX ADMIN — GABAPENTIN 200 MG: 100 CAPSULE ORAL at 16:01

## 2022-04-17 RX ADMIN — LANTHANUM CARBONATE 1000 MG: 500 TABLET, CHEWABLE ORAL at 18:07

## 2022-04-17 RX ADMIN — LANTHANUM CARBONATE 1000 MG: 500 TABLET, CHEWABLE ORAL at 09:31

## 2022-04-17 RX ADMIN — TIMOLOL MALEATE 1 DROP: 5 SOLUTION OPHTHALMIC at 20:11

## 2022-04-17 RX ADMIN — INSULIN GLARGINE-YFGN 5 UNITS: 100 INJECTION, SOLUTION SUBCUTANEOUS at 20:11

## 2022-04-17 RX ADMIN — BUMETANIDE 2 MG: 1 TABLET ORAL at 09:31

## 2022-04-17 RX ADMIN — OXYCODONE AND ACETAMINOPHEN 1 TABLET: 5; 325 TABLET ORAL at 16:00

## 2022-04-17 RX ADMIN — OXYCODONE AND ACETAMINOPHEN 1 TABLET: 5; 325 TABLET ORAL at 23:06

## 2022-04-17 RX ADMIN — LATANOPROST 1 DROP: 50 SOLUTION OPHTHALMIC at 20:11

## 2022-04-17 RX ADMIN — GABAPENTIN 200 MG: 100 CAPSULE ORAL at 09:31

## 2022-04-17 ASSESSMENT — PAIN DESCRIPTION - LOCATION
LOCATION: BACK;LEG

## 2022-04-17 ASSESSMENT — PAIN DESCRIPTION - DESCRIPTORS
DESCRIPTORS: ACHING;DISCOMFORT;NAGGING
DESCRIPTORS: CONSTANT;DISCOMFORT;SHOOTING
DESCRIPTORS: CONSTANT;DISCOMFORT;SHOOTING
DESCRIPTORS: CONSTANT;DISCOMFORT;NAGGING
DESCRIPTORS: CONSTANT;DISCOMFORT;SHOOTING

## 2022-04-17 ASSESSMENT — PAIN SCALES - GENERAL
PAINLEVEL_OUTOF10: 3
PAINLEVEL_OUTOF10: 7
PAINLEVEL_OUTOF10: 4
PAINLEVEL_OUTOF10: 7
PAINLEVEL_OUTOF10: 8
PAINLEVEL_OUTOF10: 6
PAINLEVEL_OUTOF10: 7
PAINLEVEL_OUTOF10: 3

## 2022-04-17 ASSESSMENT — PAIN DESCRIPTION - ONSET
ONSET: ON-GOING

## 2022-04-17 ASSESSMENT — PAIN DESCRIPTION - FREQUENCY
FREQUENCY: CONTINUOUS

## 2022-04-17 ASSESSMENT — PAIN - FUNCTIONAL ASSESSMENT
PAIN_FUNCTIONAL_ASSESSMENT: PREVENTS OR INTERFERES SOME ACTIVE ACTIVITIES AND ADLS

## 2022-04-17 ASSESSMENT — PAIN DESCRIPTION - PAIN TYPE
TYPE: ACUTE PAIN;CHRONIC PAIN

## 2022-04-17 ASSESSMENT — PAIN DESCRIPTION - ORIENTATION
ORIENTATION: RIGHT

## 2022-04-17 ASSESSMENT — PAIN DESCRIPTION - PROGRESSION
CLINICAL_PROGRESSION: NOT CHANGED

## 2022-04-17 NOTE — PROGRESS NOTES
Physical Therapy  Physical Therapy Initial Assessment     Name: Sherryle Fort  : 1955  MRN: [id number]      Date of Service: 2022    Evaluating PT:  Tung Chan PT, DPT [id number]    Room #:  5758/7922-P  Diagnosis:  Unable to ambulate [R26.2]  Intractable low back pain [M54.59]  Ambulatory dysfunction [R26.2]  PMHx/PSHx:  Acute infection of bone, anemia, arthritis, CAD, CHF, carpal tunnel, ESRD on HD, diabetic retinopathy, glaucoma, HLD, HTN, DM, vitreous hemorrhage  Procedure/Surgery:  None this admission  Precautions:  Falls  Equipment Needs:  None, patient has FWW, SPC, and transport chair    SUBJECTIVE:    Pt lives alone in a 1 story home with level entry. Bed is on 1st floor and bath is on 1st floor. Pt ambulated with FWW PTA. OBJECTIVE:   Initial Evaluation  Date: 22 Treatment Short Term/ Long Term   Goals   AM-PAC 6 Clicks      Was pt agreeable to Eval/treatment? yes     Does pt have pain? 8/10 LBP     Bed Mobility  Rolling: Carlos Alberto  Supine to sit: Carlos Alberto  Sit to supine: Carlos Alberto  Scooting: Carlos Alberto  Rolling: Independent   Supine to sit: Independent   Sit to supine: Independent   Scooting: Independent    Transfers Sit to stand: [place] Alberto  Stand to sit: Carlos Alberto  Stand pivot: NT, unable  Sit to stand: Independent   Stand to sit: Independent   Stand pivot: Mod I with [employer]   Ambulation    few side steps at EOB with [employer] Carlos Alberto  50 feet with [employer] Mod I   Stair negotiation: ascended and descended  NT  TBD   ROM BUE:  Defer to OT note  BLE:  WFL     Strength BUE:  Defer to OT note  BLE:  Grossly 3/5  WFL   Balance Sitting EOB:  SBA  Dynamic Standing:  Carlos Alberto with [employer]  Sitting EOB:  Independent   Dynamic Standing:   Mod I with [employer]     Pt is A & O x 4  Sensation:  Denies abnormalities  Edema:  None noted    Patient education  Pt educated on role of PT, safety during mobility    Patient response to education:   Pt verbalized understanding Pt demonstrated skill Pt requires further education in this area   yes yes yes ASSESSMENT:    Conditions Requiring Skilled Therapeutic Intervention:    [x]Decreased strength     [x]Decreased ROM  [x]Decreased functional mobility  [x]Decreased balance   [x]Decreased endurance   [x]Decreased posture  []Decreased sensation  []Decreased coordination   []Decreased vision  []Decreased safety awareness   [x]Increased pain       Comments:  Patient semi-supine in bed upon entry and agreeable to PT evaluation. Patient able to complete bed mobility with light assist to trunk. At EOB , patient reports significant LBP and flexes maximally forward with difficulty maintaining upright posture. Patient sat EOB for approximately 10 minutes during session this way. Patient able to stand and perform side steps at EOB with light assist and use of [employer]. Patient declined forward ambulation this date d/t pain. Assisted patient back to bed and placed in seated position with all needs met. Patient to benefit from continued skilled PT at [place] to improve functional mobility and decrease fall risk. Treatment:  Patient practiced and was instructed in the following treatment:     Bed Mobility: VCs provided for sequencing and safety during mobility. Manual assist provided for completion of task.  Transfer Training: Verbal and tactile cueing provided for sequencing and safety during mobility. Manual assist provided for completion of task   Gait Training: side steps with [employer] and verbal cues for proper technique and safety. Manual assist provided for completion of task     Pt's/ family goals   1. Decrease pain    Prognosis is good for reaching above PT goals. Patient and or family understand(s) diagnosis, prognosis, and plan of care.   yes    PHYSICAL THERAPY PLAN OF CARE:    PT POC is established based on physician order and patient diagnosis     Referring provider/PT Order:    04/15/22 1030  PT eval and treat  Start:  04/15/22 1030,   End:  04/15/22 1030,   ONE TIME,   Standing Count:  1 Occurrences,   R [address], DO       Diagnosis:  Unable to ambulate [R26.2]  Intractable low back pain [M54.59]  Ambulatory dysfunction [R26.2]  Specific instructions for next treatment:  Progress mobility as appropriate     Current Treatment Recommendations:     [x] Strengthening to improve independence with functional mobility   [x] ROM to improve independence with functional mobility   [x] Balance Training to improve static/dynamic balance and to reduce fall risk  [x] Endurance Training to improve activity tolerance during functional mobility   [x] Transfer Training to improve safety and independence with all functional transfers   [x] Gait Training to improve gait mechanics, endurance and assess need for appropriate assistive device  [] Stair Training in preparation for safe discharge home and/or into the community   [x] Positioning to prevent skin breakdown and contractures  [x] Safety and Education Training   [x] Patient/Caregiver Education   [x] HEP  [x] Other     PT long term treatment goals are located in above grid    Frequency of treatments: 2-5x/week x 1-2 weeks. Time in  0830  Time out  0855    Total Treatment Time  10 minutes     Evaluation Time includes thorough review of current medical information, gathering information on past medical history/social history and prior level of function, completion of standardized testing/informal observation of tasks, assessment of data and education on plan of care and goals.     CPT codes:  [x] Low Complexity PT evaluation 67537  [] Moderate Complexity PT evaluation 59031  [] High Complexity PT evaluation 95015  [] PT Re-evaluation 55925  [] Gait training 23770 - minutes  [] Manual therapy 40713 - minutes  [x] Therapeutic activities 72546 10 minutes  [] Therapeutic exercises 88104 - minutes  [] Neuromuscular reeducation 04371 - minutes     Anaya Chappell PT, DPT  [id number]

## 2022-04-17 NOTE — PROGRESS NOTES
Nephrology Progress Note  4/17/2022 11:03 AM  Subjective:   Admit Date: 4/13/2022  PCP: Marko Ordonez MD    Interval History: Patient told me that despite her back pain she was able to take few steps walking today with help. She told me that she decided to ask for to [place]. Her main complaint continues to be her back pain. No shortness of breath able to eat and drink    Diet: ADULT DIET; Regular; Low Sodium (2 gm); Low Potassium (Less than 3000 mg/day); Low Phosphorus (Less than 1000 mg)    Data:     Scheduled Meds:   sodium chloride flush  5-40 mL IntraVENous 2 times per day    atorvastatin  80 mg Oral Nightly    allopurinol  100 mg Oral Daily    b complex-C-folic acid  1 mg Oral Nightly    bumetanide  2 mg Oral Once per day on Sun Tue Thu    gabapentin  200 mg Oral TID    hydrALAZINE  10 mg Oral BID    insulin glargine-yfgn  5 Units SubCUTAneous Nightly    isosorbide mononitrate  30 mg Oral Daily    latanoprost  1 drop Right Eye Nightly    pantoprazole  40 mg Oral QAM AC    metoprolol succinate  25 mg Oral Daily    [Held by provider] ticagrelor  90 mg Oral BID    lanthanum  1,000 mg Oral TID WC    brimonidine  1 drop Right Eye BID    And    timolol  1 drop Right Eye BID     Continuous Infusions:   sodium chloride      dextrose       PRN Meds:sodium chloride flush, sodium chloride, fentanNYL, lidocaine-prilocaine, midodrine, acetaminophen, melatonin, polyethylene glycol, glucose, dextrose, glucagon (rDNA), dextrose, ondansetron, oxyCODONE-acetaminophen  I/O last 3 completed shifts: In: 0334 [P.O.:1130; I.V.:10]  Out: -   No intake/output data recorded.     Intake/Output Summary (Last 24 hours) at 4/17/2022 1103  Last data filed at 4/17/2022 0602  Gross per 24 hour   Intake 770 ml   Output --   Net 770 ml     CBC:   Recent Labs     04/15/22  0605 04/16/22  0523 04/17/22  0501   WBC 5.5 5.2 4.5   HGB 9.3* 8.9* 9.0*    329 321     BMP:    Recent Labs     04/15/22  0605 04/16/22  0523 04/17/22  0501    139 136   K 4.4 4.1 4.3    100 100   CO2 25 23 24   BUN 30* 20 31*   CREATININE 5.2* 3.0* 4.8*   GLUCOSE 56* 129* 128*     Hepatic:   Recent Labs     04/15/22  0605 04/16/22  0523 04/17/22  0501   AST 38* 32* 24   ALT 31 28 26   BILITOT 0.4 0.4 0.3   ALKPHOS 104 104 106*     Protein/ Albumin:    Lab Results   Component Value Date    LABALBU 2.7 (L) 04/17/2022        Objective:     Vitals: BP (!) 104/36   Pulse 58   Temp 98 °F (36.7 °C) (Oral)   Resp 18   Ht 5' 10\" (1.778 m)   Wt 167 lb 8.8 oz (76 kg)   SpO2 94%   BMI 24.04 kg/m²   General appearance: Awake, alert and oriented not in distress and not on oxygen  Lungs: Good air movement  Heart: No S3, No rub  Abdomen: Lax, soft No tenderness  L. Extremities: No edema  Patent left upper arm access  Right hemiparesis    Assessment & Plan:     End-stage kidney disease: Continue hemodialysis Monday Wednesday and Friday via a patent and well-developed left upper arm fistula    Controlled hypertension with chronic kidney disease    Anemia with chronic kidney disease: Transfuse as needed    Back pain: Neurosurgery and ID following      This note was created using voice recognition software.   Electronically signed by Edwar Villanueva MD on 4/17/2022 at 11:03 AM

## 2022-04-17 NOTE — PROGRESS NOTES
Infectious Disease  Progress Note  [facility]    Chief Complaint: R/O  lumbar osteomyelitis    Subjective:  Reports low back pain, denies bowel or bladder incontinence   Afebrile       Scheduled Meds:   sodium chloride flush  5-40 mL IntraVENous 2 times per day    atorvastatin  80 mg Oral Nightly    allopurinol  100 mg Oral Daily    b complex-C-folic acid  1 mg Oral Nightly    bumetanide  2 mg Oral Once per day on Sun Tue Thu    gabapentin  200 mg Oral TID    hydrALAZINE  10 mg Oral BID    insulin glargine-yfgn  5 Units SubCUTAneous Nightly    isosorbide mononitrate  30 mg Oral Daily    latanoprost  1 drop Right Eye Nightly    pantoprazole  40 mg Oral QAM AC    metoprolol succinate  25 mg Oral Daily    [Held by provider] ticagrelor  90 mg Oral BID    lanthanum  1,000 mg Oral TID WC    brimonidine  1 drop Right Eye BID    And    timolol  1 drop Right Eye BID     Continuous Infusions:   sodium chloride      dextrose       PRN Meds:sodium chloride flush, sodium chloride, fentanNYL, lidocaine-prilocaine, midodrine, acetaminophen, melatonin, polyethylene glycol, glucose, dextrose, glucagon (rDNA), dextrose, ondansetron, oxyCODONE-acetaminophen    ROS:  As mentioned in subjective, all other systems negative      BP (!) 109/51   Pulse 64   Temp 97.7 °F (36.5 °C) (Oral)   Resp 19   Ht 5' 10\" (1.778 m)   Wt 167 lb 8.8 oz (76 kg)   SpO2 92%   BMI 24.04 kg/m²     Physical Exam  Constitutional: The patient is awake, alert, and oriented. Skin: Warm and dry. No jaundice. HEENT: Eyes show round, and reactive pupils. Neck: Supple to movements. No lymphadenopathy. Chest: Clear to auscultation posteriorly  Cardiovascular: S1 and S2 are rhythmic and regular. No murmurs appreciated. Abdomen: Soft nontender  Extremities: No clubbing, no cyanosis, no edema.   Musculoskeletal:  No edema, strength good, AV fistula ok   Tender L spine   Neurological: Alert and oriented x 3,   Lines: peripheral    CBC with Differential:      Lab Results   Component Value Date    WBC 4.5 04/17/2022    RBC 3.28 04/17/2022    HGB 9.0 04/17/2022    HCT 30.4 04/17/2022     04/17/2022    MCV 92.7 04/17/2022    MCH 27.4 04/17/2022    MCHC 29.6 04/17/2022    RDW 17.4 04/17/2022    NRBC 0.0 02/18/2017    SEGSPCT 70 03/12/2014    BANDSPCT 1 02/18/2015    LYMPHOPCT 26.8 04/15/2022    PROMYELOPCT 1.8 02/18/2017    MONOPCT 16.6 04/15/2022    MYELOPCT 0.9 10/24/2017    BASOPCT 0.7 04/15/2022    MONOSABS 0.91 04/15/2022    LYMPHSABS 1.47 04/15/2022    EOSABS 0.18 04/15/2022    BASOSABS 0.04 04/15/2022       CMP:    Lab Results   Component Value Date     04/17/2022    K 4.3 04/17/2022    K 4.6 04/13/2022     04/17/2022    CO2 24 04/17/2022    BUN 31 04/17/2022    CREATININE 4.8 04/17/2022    GFRAA 11 04/17/2022    LABGLOM 11 04/17/2022    GLUCOSE 128 04/17/2022    GLUCOSE 46 04/02/2012    PROT 6.0 04/17/2022    LABALBU 2.7 04/17/2022    LABALBU 3.8 04/02/2012    CALCIUM 9.1 04/17/2022    BILITOT 0.3 04/17/2022    ALKPHOS 106 04/17/2022    AST 24 04/17/2022    ALT 26 04/17/2022       Hepatic Function Panel:    Lab Results   Component Value Date    ALKPHOS 106 04/17/2022    ALT 26 04/17/2022    AST 24 04/17/2022    PROT 6.0 04/17/2022    BILITOT 0.3 04/17/2022    BILIDIR <0.2 05/25/2021    IBILI see below 05/25/2021    LABALBU 2.7 04/17/2022    LABALBU 3.8 04/02/2012         Microbiology :    Blood culture - neg to date       Radiology :      CT scan of L spine:    Impression:        New loss of height of L1 inferior endplate with increased soft tissue   inflammation.  Finding is suspicious for possible osteomyelitis.  Further   evaluation with lumbar spine MRI may be considered.                Assessment:  · R/o Lumbar spine osteomyelitis: History of prior back surgery and recent epidural nerve block.   · Pending cardiology and CT surgery clearance for anesthesia for bone biopsy.      Plan:    · Bone biopsy ( to be done ) prior to abx    · Awaiting clearance for cardiology and CT surgery. · Will follow with you.      Electronically signed by Amarilys Rucker MD on 4/17/2022 at 9:14 AM

## 2022-04-17 NOTE — PROGRESS NOTES
[address]          Date:2022                                                   Patient Name: Anthony Ivy     MRN: [id number]     : 1955     Room: [address] [place] [address]       Evaluating OT: Leonidas Couch OTD, OTR/L, [id number]      Referring Provider: Se Griggs DO    Specific Provider Orders/Date: OT eval and treat (4/15/22)    Diagnosis: Unable to ambulate [R26.2]  Intractable low back pain [M54.59]  Ambulatory dysfunction [R26.2]    Surgeries/Procedures:  None this admission      Pt admitted from home after recent stay at [facility]. S/p L3-L4, L4-L5 fusion. Pertinent Medical History:       has a past medical history of Acute infection of bone ([address] Acute osteomyelitis of phalanx of left hand ([address] Anemia of chronic disease, Arthritis, Breast cancer ([address] CAD (coronary artery disease), Carpal tunnel syndrome, Chronic diastolic CHF (congestive heart failure) ([address] CKD (chronic kidney disease) stage 4, GFR 15-29 ml/min ([facility] Diabetic retinopathy ([address] Glaucoma, Hemodialysis patient ([facility] [address] [facility] Hyperkalemia, diminished renal excretion, Hyperlipidemia, Hypertension, 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          Precautions:  Fall Risk, chronic pain     Assessment of current deficits    [x] Functional mobility  [x]ADLs  [x] Strength               []Cognition    [x] Functional transfers   [x] IADLs         [x] Safety Awareness   [x]Endurance    [x] Fine Coordination              [x] Balance      [] Vision/perception   []Sensation     []Gross Motor Coordination  [] ROM  [] Delirium                   [] Motor Control     OT PLAN OF CARE   OT POC based on physician orders, patient diagnosis and results of clinical assessment    Frequency/Duration 2-5 days/wk for 2 weeks PRN   Specific OT Treatment Interventions to include:   * Instruction/training on adapted ADL techniques and AE recommendations to increase functional independence within precautions       * Training on energy conservation strategies, correct breathing pattern and techniques to improve independence/tolerance for self-care routine  * Functional transfer/mobility training/DME recommendations for increased independence, safety, and fall prevention  * Patient/Family education to increase follow through with safety techniques and functional independence  * Recommendation of environmental modifications for increased safety with functional transfers/mobility and ADLs  * Cognitive retraining/development of therapeutic activities to improve problem solving, judgement, memory, and attention for increased safety/participation in ADL/IADL tasks  * Sensory re-education to improve body/limb awareness, maintain/improve skin integrity, and improve hand/UE motor function  * Visual-perceptual training to improve environmental scanning, visual attention/focus, and oculomotor skills for increased safety/independence with functional transfers/mobility and ADLs  * Splinting/positioning for increased function, prevention of contractures, and improve skin integrity  * Therapeutic exercise to improve motor endurance, ROM, and functional strength for ADLs/functional transfers  * Therapeutic activities to facilitate/challenge dynamic balance, stand tolerance for increased safety and independence with ADLs  * Therapeutic activities to facilitate gross/fine motor skills for increased independence with ADLs  * Neuro-muscular re-education: facilitation of righting/equilibrium reactions, midline orientation, scapular stability/mobility, normalization of muscle tone, and facilitation of volitional active controled movement  * Positioning to improve skin integrity, interaction with environment and functional independence    Recommended Adaptive Equipment/DME: BSC, LB AE PRN, TBD     Home Living: Pt lives alone in 1 floor condo with 0 NIKKO and bed and bath on main floor. Bathroom setup: tub/shower, standard commode   Equipment owned: shower chair, grab bar, walker, transport chair    Prior Level of Function: Assist with ADLs , Assist with IADLs; ambulated with FWW PTA. Patient planning to receive assist from aid follow SNF stay. Driving: No  Occupation: None stated    Pain Level: 7-8/10; back, and BLE leg pain. Cognition: A&O: 4/4; Follows 2 step directions   Memory: G-   Sequencing: F+   Problem solving: F+   Judgement/safety: F+    Vitals Assessed: None     Functional Assessment:  AM-PAC Daily Activity Raw Score: 11/24   Initial Eval Status  Date: 4/17/22 Treatment Status  Date: STGs = LTGs  Time frame: 10-14 days   Feeding Stand by Assist   Pt limited by BUE ROM deficits d/t pain. Modified [place]    Grooming Minimal Assist  While seated at EOB to wash face. Assist d/t limited ROM and pain. Modified [place]   Seated for ADLs   UB Dressing Maximal Assist   While seated at EOB to don gown around backside. Minimal Assist   LB Dressing Dependent    to don pants at EOB. Pt limited by pain, however reports prior ability to don pants without assist.  Moderate Assist    Bathing Dependent  Moderate Assist    Toileting Dependent   Moderate Assist    Bed Mobility  Supine to sit: Minimal Assist   Sit to supine: Minimal Assist   Supine to sit: Modified [place]   Sit to supine: Modified [place]    Functional Transfers Minimal Assist   For STS from EOB. Supervision    Functional Mobility Minimal Assist with FWW  For side steps at EOB. Supervision   With least restrictive AD.    Balance Sitting:     Static:  Min A    Dynamic: Min a  Standing: Min A    during functional activity  Sitting:     Static: Ind    Dynamic:S  Standing: S   Activity Tolerance Poor with light activity. WFL   Visual/  Perceptual Glasses: Yes  No changes per pt. Safety F+  G-     Hand Dominance: R   AROM (PROM) Strength Additional Info:    RUE  AROM  Shoulder flx: 0-20  Limited by pain. PROM WFL 2+/5 fair  and wfl FMC/dexterity noted during ADL tasks       LUE AROM    Shoulder flx: 0-50  Limited by pain. PROM WFL 2+/5 fair  and wfl FMC/dexterity noted during ADL tasks       Hearing: [facility]   Sensation:  No c/o numbness or tingling  Tone: WFL  Edema: Unremarkable. Comments: Patient cleared by nursing. Upon arrival patient supine in bed , educated on the role of OT, and agreeable to OT session. No family present for session today. OT facilitated ADLs, bed mobility, functional transfers with focus on safety, body mechanics, and pain reduction. At end of session, patient supine in bed , properly positioned, oriented to call light, with call light and phone within reach, all lines and tubes intact. Nursing notified. Overall patient demonstrated decreased independence and safety during completion of ADL/functional transfer/mobility tasks. Pt would benefit from continued skilled OT to increase safety and independence with completion of ADL/IADL tasks for functional independence and quality of life. Treatment: OT treatment provided this date includes:    Instruction/training on safety and adapted techniques for completion of ADLs: to increase independence in self-care.   Instruction/training on safe functional mobility/transfer techniques: with focus on safety, body mechanics, and precautions    Instruction/training on energy conservation/work simplification for completion of ADLs:. techniques to increase independence with self-care ADLs and IADLs, work simplification to improve endurance.     Proper Positioning/Alignment: for optimal healing, skin integrity, to prevent breakdown, decrease edema, and reduce risk of contracture.  Therapeutic activity: to challenge dynamic sitting/standing balance and endurance to promote safety during ADL tasks and functional transfers and mobility. Rehab Potential: Good  for established goals     Patient / Family Goal: To return home. Patient and/or family were instructed on functional diagnosis, prognosis/goals and OT plan of care. Demonstrated good understanding. Eval Complexity: Low    Time In: 8:32  Time Out: 8:55  Total Treatment Time: 8 minutes    Min Units   OT Eval Low 97165  x 1    OT Eval Medium 05536      OT Eval High 58440      OT Re-Eval [id number]       Therapeutic Ex 77377       Therapeutic Activities 51907       ADL/Self Care 35983  8 1    Orthotic Management 06809       Manual 09957     Neuro Re-Ed 70548       Non-Billable Time          Evaluation Time additionally includes thorough review of current medical information, gathering information on past medical history/social history and prior level of function, interpretation of standardized testing/informal observation of tasks, assessment of data and development of plan of care and goals.             Cammie Jorge, GIANA,  OTR/L; [id number]

## 2022-04-17 NOTE — PROGRESS NOTES
Hospitalist Progress Note      SYNOPSIS: Patient admitted on 2022 for Unable to ambulate has a past medical history that includes spinal stenosis, ESRD on dialysis, chronic anemia, hypertension, hyperlipidemia, diabetes mellitus, gout, coronary artery disease, mitral valve papillary fibroblastoma.     Claudia presented to the ER with back pain and issues with ambulation. She states she was unable to get out of bed to go to dialysis today. The patient has significant heart disease including valvular disease that anesthesiology has stated as a barrier to her doing surgery on her back.  Unfortunately, until she is able to ambulate effectively for post surgical rehab, the patient cannot have corrective surgery on her heart either.  Due to this difficult position, the patient was treated about a month ago with a nerve block of her back which was effective for about a month and she was able to do physical therapy. She was recently discharged from SNF on 4/3. However today pain caused patient to be unable to go to dialysis today. She denies any bowel or bladder incontinence.     CT of the lumbar spine showed new loss of height in L1 inferior endplate with increased soft tissue inflammation. Findings suspicious for possible osteomyelitis.     Patient has spinal cord stimulator so is unable to have MRI      SUBJECTIVE:  Stable overnight. No other overnight issues reported. Patient seen and examined  Records reviewed. Plan is for bone biopsy tomorrow        Temp (24hrs), Av.6 °F (36.4 °C), Min:97.5 °F (36.4 °C), Max:97.7 °F (36.5 °C)    DIET: ADULT DIET; Regular; Low Sodium (2 gm); Low Potassium (Less than 3000 mg/day);  Low Phosphorus (Less than 1000 mg)  CODE: Full Code    Intake/Output Summary (Last 24 hours) at 2022 0918  Last data filed at 2022 0602  Gross per 24 hour   Intake 770 ml   Output --   Net 770 ml       Review of Systems  All bolded are positive; please see HPI  General:  Fever, chills, diaphoresis, fatigue, malaise, night sweats, weight loss  Psychological:  Anxiety, disorientation, hallucinations. ENT:  Epistaxis, headaches, vertigo, visual changes. Cardiovascular:  Chest pain, irregular heartbeats, palpitations, paroxysmal nocturnal dyspnea. Respiratory:  Shortness of breath, coughing, sputum production, hemoptysis, wheezing, orthopnea. Gastrointestinal:  Nausea, vomiting, diarrhea, heartburn, constipation, abdominal pain, hematemesis, hematochezia, melena, acholic stools  Genito-Urinary:  Dysuria, urgency, frequency, hematuria  Musculoskeletal:  Joint pain, joint stiffness, joint swelling, muscle pain back pain  Neurology:  Headache, focal neurological deficits, weakness, numbness, paresthesia  Derm:  Rashes, ulcers, excoriations, bruising  Extremities:  Decreased ROM, peripheral edema, mottling      OBJECTIVE:    BP (!) 104/36   Pulse 58   Temp 97.7 °F (36.5 °C) (Oral)   Resp 18   Ht 5' 10\" (1.778 m)   Wt 167 lb 8.8 oz (76 kg)   SpO2 94%   BMI 24.04 kg/m²     General appearance:  awake, alert, and oriented to person, place, time, and purpose; appears stated age and cooperative; no apparent distress no labored breathing  HEENT:  Conjunctivae/corneas clear. Neck: Supple. No jugular venous distention. Respiratory: symmetrical; clear to auscultation bilaterally; no wheezes; no rhonchi; no rales  Cardiovascular: rhythm regular; rate controlled; no murmurs  Abdomen: Soft, nontender, nondistended  Extremities:  peripheral pulses present; no peripheral edema; no ulcers  Musculoskeletal: No clubbing, cyanosis, no bilateral lower extremity edema. Brisk capillary refill. Skin:  No rashes  on visible skin  Neurologic: awake, alert and following commands     ASSESSMENT and PLAN:  · Back pain with spinal stenosis and possible osteomyelitis of lumbar spine -Neurosurgery consultation. Pain control. ID consulted. Patient was unable to get MRI due to stimulator.  Plan is for bone biopsy tomorrow. · CAD- with h/o LAD and RCA stents 5/2021 and with cath 12/21 showing patent LAD stent, high grade focal in stent restenosis of the RCA and known  of OM2. Was supposed to have CABG x 2 with resection of the MV fibroelastoma and possible mitral valve repair, however developed osteo of finger so this was delayed. Cannot be percutaneously revascularized prior to back surgery since she will need to be on DAPT if that is done and which will need to be stopped prior to spinal surgery. Resume aspirin and Brilinta ASAP after surgery per neurosurgeries discretion. Will need to follow with CTS OP  · Mitral valve Papillary fibroelastoma   · End-stage renal disease on hemodialysis- Monday Wednesday Friday, consult nephrology for renal replacement therapy. · Chronic anemia secondary to end-stage renal disease: Monitor H&H  · Essential hypertension: Continue outpatient medications with hydralazine and metoprolol.   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  · Jehovah Witness          Medications:  REVIEWED DAILY    Infusion Medications    sodium chloride      dextrose       Scheduled Medications    sodium chloride flush  5-40 mL IntraVENous 2 times per day    atorvastatin  80 mg Oral Nightly    allopurinol  100 mg Oral Daily    b complex-C-folic acid  1 mg Oral Nightly    bumetanide  2 mg Oral Once per day on Sun Tue Thu    gabapentin  200 mg Oral TID    hydrALAZINE  10 mg Oral BID    insulin glargine-yfgn  5 Units SubCUTAneous Nightly    isosorbide mononitrate  30 mg Oral Daily    latanoprost  1 drop Right Eye Nightly    pantoprazole  40 mg Oral QAM AC    metoprolol succinate  25 mg Oral Daily    [Held by provider] ticagrelor  90 mg Oral BID    lanthanum 1,000 mg Oral TID WC    brimonidine  1 drop Right Eye BID    And    timolol  1 drop Right Eye BID     PRN Meds: sodium chloride flush, sodium chloride, fentanNYL, lidocaine-prilocaine, midodrine, acetaminophen, melatonin, polyethylene glycol, glucose, dextrose, glucagon (rDNA), dextrose, ondansetron, oxyCODONE-acetaminophen    Labs:     Recent Labs     04/15/22  0605 04/16/22  0523 04/17/22  0501   WBC 5.5 5.2 4.5   HGB 9.3* 8.9* 9.0*   HCT 31.7* 30.0* 30.4*    329 321       Recent Labs     04/15/22  0456 04/15/22  0456 04/15/22  0605 04/16/22  0523 04/17/22  0501      < > 142 139 136   K 4.7   < > 4.4 4.1 4.3      < > 104 100 100   CO2 24   < > 25 23 24   BUN 29*   < > 30* 20 31*   CREATININE 5.1*   < > 5.2* 3.0* 4.8*   CALCIUM 8.9   < > 8.9 8.9 9.1   PHOS 4.1  --   --   --   --     < > = values in this interval not displayed. Recent Labs     04/15/22  0605 04/16/22  0523 04/17/22  0501   PROT 6.2* 6.0* 6.0*   ALKPHOS 104 104 106*   ALT 31 28 26   AST 38* 32* 24   BILITOT 0.4 0.4 0.3       No results for input(s): INR in the last 72 hours. No results for input(s): Jeannette Perez in the last 72 hours. Chronic labs:    Lab Results   Component Value Date    CHOL 100 04/15/2022    TRIG 107 04/15/2022    HDL 27 04/15/2022    LDLCALC 52 04/15/2022    TSH 1.500 04/15/2022    INR 1.0 01/20/2022    LABA1C 5.5 04/15/2022       Radiology: REVIEWED DAILY    +++++++++++++++++++++++++++++++++++++++++++++++++  DO Nick Watts Physician - [address], [place]  +++++++++++++++++++++++++++++++++++++++++++++++++  NOTE: This report was transcribed using voice recognition software. Every effort was made to ensure accuracy; however, inadvertent computerized transcription errors may be present.

## 2022-04-17 NOTE — CONSULTS
Patient was scheduled for a Lumbar Bone Bx on 4/15/22 under [facility] but was cancelled due to an Elevated Troponin of 219 reported on 4/13/22. Cardiology reevaluation was requested. The cardiologist in a note from 4/16/22 stated that although a high risk for surgery that surgery was not precluded. The patient was evaluated for and scheduled for elective CABG on 12/29/21 but was cancelled due to a finger abscess. ID is awaiting the Bone Bx results to initiate appropriate antibiotic which needs resolution prior to rescheduling for an elective CABG. Discussed above with patient and Dr. Harish Whiting. She agrees to proceed with the Lumbar Bone Bx under Local Anesthesia with minimal sedation.     Yamilet Lopez MD

## 2022-04-17 NOTE — PROGRESS NOTES
Anesthesia paged regarding consult. Electronically signed by Andrea Conklin RN on 4/17/2022 at 9:53 AM    Dr. Clara Winter notified of consult.  Electronically signed by Andrea Conklin RN on 4/17/2022 at 9:59 AM

## 2022-04-18 ENCOUNTER — ANESTHESIA (OUTPATIENT)
Dept: OPERATING ROOM | Age: 67
DRG: 477 | End: 2022-04-18
Payer: COMMERCIAL

## 2022-04-18 ENCOUNTER — APPOINTMENT (OUTPATIENT)
Dept: GENERAL RADIOLOGY | Age: 67
DRG: 477 | End: 2022-04-18
Payer: COMMERCIAL

## 2022-04-18 ENCOUNTER — ANESTHESIA EVENT (OUTPATIENT)
Dept: OPERATING ROOM | Age: 67
DRG: 477 | End: 2022-04-18
Payer: COMMERCIAL

## 2022-04-18 VITALS — DIASTOLIC BLOOD PRESSURE: 43 MMHG | OXYGEN SATURATION: 100 % | SYSTOLIC BLOOD PRESSURE: 111 MMHG

## 2022-04-18 LAB
ALBUMIN SERPL-MCNC: 2.8 G/DL (ref 3.5–5.2)
ALP BLD-CCNC: 112 U/L (ref 35–104)
ALT SERPL-CCNC: 27 U/L (ref 0–32)
ANION GAP SERPL CALCULATED.3IONS-SCNC: 11 MMOL/L (ref 7–16)
AST SERPL-CCNC: 24 U/L (ref 0–31)
BASOPHILS ABSOLUTE: 0.03 E9/L (ref 0–0.2)
BASOPHILS RELATIVE PERCENT: 0.6 % (ref 0–2)
BILIRUB SERPL-MCNC: 0.3 MG/DL (ref 0–1.2)
BLOOD CULTURE, ROUTINE: NORMAL
BUN BLDV-MCNC: 41 MG/DL (ref 6–23)
CALCIUM SERPL-MCNC: 9 MG/DL (ref 8.6–10.2)
CHLORIDE BLD-SCNC: 100 MMOL/L (ref 98–107)
CO2: 24 MMOL/L (ref 22–29)
CREAT SERPL-MCNC: 6.1 MG/DL (ref 0.5–1)
CULTURE, BLOOD 2: NORMAL
EOSINOPHILS ABSOLUTE: 0.18 E9/L (ref 0.05–0.5)
EOSINOPHILS RELATIVE PERCENT: 3.4 % (ref 0–6)
GFR AFRICAN AMERICAN: 8
GFR NON-AFRICAN AMERICAN: 8 ML/MIN/1.73
GLUCOSE BLD-MCNC: 120 MG/DL (ref 74–99)
HCT VFR BLD CALC: 30.3 % (ref 34–48)
HEMOGLOBIN: 8.9 G/DL (ref 11.5–15.5)
IMMATURE GRANULOCYTES #: 0.05 E9/L
IMMATURE GRANULOCYTES %: 0.9 % (ref 0–5)
LYMPHOCYTES ABSOLUTE: 1.29 E9/L (ref 1.5–4)
LYMPHOCYTES RELATIVE PERCENT: 24.2 % (ref 20–42)
MAGNESIUM: 2.5 MG/DL (ref 1.6–2.6)
MCH RBC QN AUTO: 27.5 PG (ref 26–35)
MCHC RBC AUTO-ENTMCNC: 29.4 % (ref 32–34.5)
MCV RBC AUTO: 93.5 FL (ref 80–99.9)
METER GLUCOSE: 116 MG/DL (ref 74–99)
METER GLUCOSE: 74 MG/DL (ref 74–99)
MONOCYTES ABSOLUTE: 0.75 E9/L (ref 0.1–0.95)
MONOCYTES RELATIVE PERCENT: 14 % (ref 2–12)
NEUTROPHILS ABSOLUTE: 3.04 E9/L (ref 1.8–7.3)
NEUTROPHILS RELATIVE PERCENT: 56.9 % (ref 43–80)
PDW BLD-RTO: 17.3 FL (ref 11.5–15)
PHOSPHORUS: 4.7 MG/DL (ref 2.5–4.5)
PLATELET # BLD: 315 E9/L (ref 130–450)
PMV BLD AUTO: 9.8 FL (ref 7–12)
POTASSIUM SERPL-SCNC: 4.8 MMOL/L (ref 3.5–5)
RBC # BLD: 3.24 E12/L (ref 3.5–5.5)
SODIUM BLD-SCNC: 135 MMOL/L (ref 132–146)
TOTAL PROTEIN: 5.9 G/DL (ref 6.4–8.3)
WBC # BLD: 5.3 E9/L (ref 4.5–11.5)

## 2022-04-18 PROCEDURE — C1894 INTRO/SHEATH, NON-LASER: HCPCS | Performed by: NEUROLOGICAL SURGERY

## 2022-04-18 PROCEDURE — 36592 COLLECT BLOOD FROM PICC: CPT

## 2022-04-18 PROCEDURE — 7100000001 HC PACU RECOVERY - ADDTL 15 MIN: Performed by: NEUROLOGICAL SURGERY

## 2022-04-18 PROCEDURE — 2580000003 HC RX 258

## 2022-04-18 PROCEDURE — 84100 ASSAY OF PHOSPHORUS: CPT

## 2022-04-18 PROCEDURE — 3700000001 HC ADD 15 MINUTES (ANESTHESIA): Performed by: NEUROLOGICAL SURGERY

## 2022-04-18 PROCEDURE — 87070 CULTURE OTHR SPECIMN AEROBIC: CPT

## 2022-04-18 PROCEDURE — 3600000002 HC SURGERY LEVEL 2 BASE: Performed by: NEUROLOGICAL SURGERY

## 2022-04-18 PROCEDURE — 36415 COLL VENOUS BLD VENIPUNCTURE: CPT

## 2022-04-18 PROCEDURE — 6360000002 HC RX W HCPCS

## 2022-04-18 PROCEDURE — 2709999900 HC NON-CHARGEABLE SUPPLY: Performed by: NEUROLOGICAL SURGERY

## 2022-04-18 PROCEDURE — 90935 HEMODIALYSIS ONE EVALUATION: CPT | Performed by: INTERNAL MEDICINE

## 2022-04-18 PROCEDURE — 2500000003 HC RX 250 WO HCPCS

## 2022-04-18 PROCEDURE — 6370000000 HC RX 637 (ALT 250 FOR IP): Performed by: NEUROLOGICAL SURGERY

## 2022-04-18 PROCEDURE — 7100000000 HC PACU RECOVERY - FIRST 15 MIN: Performed by: NEUROLOGICAL SURGERY

## 2022-04-18 PROCEDURE — 6370000000 HC RX 637 (ALT 250 FOR IP): Performed by: INTERNAL MEDICINE

## 2022-04-18 PROCEDURE — 2500000003 HC RX 250 WO HCPCS: Performed by: NEUROLOGICAL SURGERY

## 2022-04-18 PROCEDURE — 87077 CULTURE AEROBIC IDENTIFY: CPT

## 2022-04-18 PROCEDURE — 87205 SMEAR GRAM STAIN: CPT

## 2022-04-18 PROCEDURE — 0QB03ZX EXCISION OF LUMBAR VERTEBRA, PERCUTANEOUS APPROACH, DIAGNOSTIC: ICD-10-PCS | Performed by: NEUROLOGICAL SURGERY

## 2022-04-18 PROCEDURE — 2580000003 HC RX 258: Performed by: STUDENT IN AN ORGANIZED HEALTH CARE EDUCATION/TRAINING PROGRAM

## 2022-04-18 PROCEDURE — 87075 CULTR BACTERIA EXCEPT BLOOD: CPT

## 2022-04-18 PROCEDURE — 2060000000 HC ICU INTERMEDIATE R&B

## 2022-04-18 PROCEDURE — 82962 GLUCOSE BLOOD TEST: CPT

## 2022-04-18 PROCEDURE — 88311 DECALCIFY TISSUE: CPT

## 2022-04-18 PROCEDURE — 87186 SC STD MICRODIL/AGAR DIL: CPT

## 2022-04-18 PROCEDURE — 88307 TISSUE EXAM BY PATHOLOGIST: CPT

## 2022-04-18 PROCEDURE — 3600000012 HC SURGERY LEVEL 2 ADDTL 15MIN: Performed by: NEUROLOGICAL SURGERY

## 2022-04-18 PROCEDURE — 83735 ASSAY OF MAGNESIUM: CPT

## 2022-04-18 PROCEDURE — 3700000000 HC ANESTHESIA ATTENDED CARE: Performed by: NEUROLOGICAL SURGERY

## 2022-04-18 PROCEDURE — S5553 INSULIN LONG ACTING 5 U: HCPCS | Performed by: NEUROLOGICAL SURGERY

## 2022-04-18 PROCEDURE — 3209999900 FLUORO FOR SURGICAL PROCEDURES

## 2022-04-18 PROCEDURE — 85025 COMPLETE CBC W/AUTO DIFF WBC: CPT

## 2022-04-18 PROCEDURE — 80053 COMPREHEN METABOLIC PANEL: CPT

## 2022-04-18 PROCEDURE — 6360000002 HC RX W HCPCS: Performed by: STUDENT IN AN ORGANIZED HEALTH CARE EDUCATION/TRAINING PROGRAM

## 2022-04-18 RX ORDER — CEFAZOLIN SODIUM 1 G/3ML
INJECTION, POWDER, FOR SOLUTION INTRAMUSCULAR; INTRAVENOUS PRN
Status: DISCONTINUED | OUTPATIENT
Start: 2022-04-18 | End: 2022-04-18 | Stop reason: SDUPTHER

## 2022-04-18 RX ORDER — FENTANYL CITRATE 50 UG/ML
INJECTION, SOLUTION INTRAMUSCULAR; INTRAVENOUS PRN
Status: DISCONTINUED | OUTPATIENT
Start: 2022-04-18 | End: 2022-04-18 | Stop reason: SDUPTHER

## 2022-04-18 RX ORDER — LIDOCAINE HYDROCHLORIDE AND EPINEPHRINE 10; 10 MG/ML; UG/ML
INJECTION, SOLUTION INFILTRATION; PERINEURAL PRN
Status: DISCONTINUED | OUTPATIENT
Start: 2022-04-18 | End: 2022-04-18 | Stop reason: ALTCHOICE

## 2022-04-18 RX ORDER — SODIUM CHLORIDE 9 MG/ML
INJECTION, SOLUTION INTRAVENOUS CONTINUOUS PRN
Status: DISCONTINUED | OUTPATIENT
Start: 2022-04-18 | End: 2022-04-18 | Stop reason: SDUPTHER

## 2022-04-18 RX ORDER — SODIUM CHLORIDE 0.9 % (FLUSH) 0.9 %
SYRINGE (ML) INJECTION
Status: COMPLETED
Start: 2022-04-18 | End: 2022-04-18

## 2022-04-18 RX ORDER — PROPOFOL 10 MG/ML
INJECTION, EMULSION INTRAVENOUS CONTINUOUS PRN
Status: DISCONTINUED | OUTPATIENT
Start: 2022-04-18 | End: 2022-04-18 | Stop reason: SDUPTHER

## 2022-04-18 RX ORDER — PHENYLEPHRINE HCL IN 0.9% NACL 1 MG/10 ML
SYRINGE (ML) INTRAVENOUS PRN
Status: DISCONTINUED | OUTPATIENT
Start: 2022-04-18 | End: 2022-04-18 | Stop reason: SDUPTHER

## 2022-04-18 RX ADMIN — OXYCODONE AND ACETAMINOPHEN 1 TABLET: 5; 325 TABLET ORAL at 04:56

## 2022-04-18 RX ADMIN — TIMOLOL MALEATE 1 DROP: 5 SOLUTION OPHTHALMIC at 21:03

## 2022-04-18 RX ADMIN — PANTOPRAZOLE SODIUM 40 MG: 40 TABLET, DELAYED RELEASE ORAL at 05:06

## 2022-04-18 RX ADMIN — MIDODRINE HYDROCHLORIDE 5 MG: 5 TABLET ORAL at 16:25

## 2022-04-18 RX ADMIN — Medication 200 MCG: at 11:49

## 2022-04-18 RX ADMIN — CEFAZOLIN SODIUM 2000 MG: 1 INJECTION, POWDER, FOR SOLUTION INTRAMUSCULAR; INTRAVENOUS at 11:55

## 2022-04-18 RX ADMIN — OXYCODONE AND ACETAMINOPHEN 1 TABLET: 5; 325 TABLET ORAL at 16:25

## 2022-04-18 RX ADMIN — BRIMONIDINE TARTRATE 1 DROP: 2 SOLUTION/ DROPS OPHTHALMIC at 21:03

## 2022-04-18 RX ADMIN — LANTHANUM CARBONATE 1000 MG: 500 TABLET, CHEWABLE ORAL at 18:37

## 2022-04-18 RX ADMIN — INSULIN GLARGINE-YFGN 5 UNITS: 100 INJECTION, SOLUTION SUBCUTANEOUS at 21:02

## 2022-04-18 RX ADMIN — VANCOMYCIN HYDROCHLORIDE 1500 MG: 10 INJECTION, POWDER, LYOPHILIZED, FOR SOLUTION INTRAVENOUS at 18:41

## 2022-04-18 RX ADMIN — LATANOPROST 1 DROP: 50 SOLUTION OPHTHALMIC at 21:18

## 2022-04-18 RX ADMIN — CEFEPIME HYDROCHLORIDE 1000 MG: 1 INJECTION, POWDER, FOR SOLUTION INTRAMUSCULAR; INTRAVENOUS at 16:55

## 2022-04-18 RX ADMIN — POLYETHYLENE GLYCOL 3350 17 G: 17 POWDER, FOR SOLUTION ORAL at 18:44

## 2022-04-18 RX ADMIN — SODIUM CHLORIDE, PRESERVATIVE FREE 10 ML: 5 INJECTION INTRAVENOUS at 16:22

## 2022-04-18 RX ADMIN — ATORVASTATIN CALCIUM 80 MG: 40 TABLET, FILM COATED ORAL at 21:03

## 2022-04-18 RX ADMIN — SODIUM CHLORIDE: 9 INJECTION, SOLUTION INTRAVENOUS at 10:40

## 2022-04-18 RX ADMIN — GABAPENTIN 200 MG: 100 CAPSULE ORAL at 16:25

## 2022-04-18 RX ADMIN — OXYCODONE AND ACETAMINOPHEN 1 TABLET: 5; 325 TABLET ORAL at 21:03

## 2022-04-18 RX ADMIN — FENTANYL CITRATE 50 MCG: 50 INJECTION, SOLUTION INTRAMUSCULAR; INTRAVENOUS at 10:34

## 2022-04-18 RX ADMIN — GABAPENTIN 200 MG: 100 CAPSULE ORAL at 21:03

## 2022-04-18 RX ADMIN — FENTANYL CITRATE 50 MCG: 50 INJECTION, SOLUTION INTRAMUSCULAR; INTRAVENOUS at 11:22

## 2022-04-18 RX ADMIN — NEPHROCAP 1 MG: 1 CAP ORAL at 21:03

## 2022-04-18 RX ADMIN — PROPOFOL 50 MCG/KG/MIN: 10 INJECTION, EMULSION INTRAVENOUS at 11:22

## 2022-04-18 ASSESSMENT — PAIN DESCRIPTION - LOCATION
LOCATION: BACK

## 2022-04-18 ASSESSMENT — PAIN DESCRIPTION - DESCRIPTORS: DESCRIPTORS: ACHING;CONSTANT;DISCOMFORT

## 2022-04-18 ASSESSMENT — PAIN SCALES - GENERAL
PAINLEVEL_OUTOF10: 0
PAINLEVEL_OUTOF10: 0
PAINLEVEL_OUTOF10: 8
PAINLEVEL_OUTOF10: 7
PAINLEVEL_OUTOF10: 8
PAINLEVEL_OUTOF10: 0

## 2022-04-18 ASSESSMENT — LIFESTYLE VARIABLES: SMOKING_STATUS: 0

## 2022-04-18 ASSESSMENT — PAIN DESCRIPTION - PAIN TYPE
TYPE: SURGICAL PAIN
TYPE: ACUTE PAIN;CHRONIC PAIN
TYPE: CHRONIC PAIN;ACUTE PAIN

## 2022-04-18 ASSESSMENT — PAIN DESCRIPTION - PROGRESSION: CLINICAL_PROGRESSION: NOT CHANGED

## 2022-04-18 ASSESSMENT — PAIN DESCRIPTION - ONSET: ONSET: ON-GOING

## 2022-04-18 ASSESSMENT — ENCOUNTER SYMPTOMS: DYSPNEA ACTIVITY LEVEL: AFTER AMBULATING 1 FLIGHT OF STAIRS

## 2022-04-18 ASSESSMENT — PAIN DESCRIPTION - ORIENTATION: ORIENTATION: MID

## 2022-04-18 ASSESSMENT — PAIN DESCRIPTION - FREQUENCY: FREQUENCY: CONTINUOUS

## 2022-04-18 NOTE — PLAN OF CARE
Reviewed her diagnostic angiogram and KIARA. She has a  of an OM which fills by collaterals and a critical stenosis of the mid RCA with LATOSHA-3 flow. Her LVEF is normal.  She has no disease in her left main or LAD. She may proceed to her indicated spinal biopsy with cardiac anesthesia and maintenance of adequate blood pressure without interrupting her beta-blocker if possible. Her statin should also be continued in the perioperative period. Upon reviewing her KIARA I did not see evidence of the described mitral valve fibroblastoma. There may be a small aortic valve fibroblastoma but the mitral valve abnormality seems to be most likely annular calcification. We will request a repeat KIARA with Dr. Klaudia Degroot during this admission and then discuss with cardiothoracic surgery. Since she has had no stroke and if indeed there is no mitral valve abnormality of significance then she may just need a RCA PCI.       Darek Griffin MD,

## 2022-04-18 NOTE — PROGRESS NOTES
Infectious Disease  Progress Note  [facility]    Chief Complaint: R/O  lumbar osteomyelitis    Subjective:  Reports low back pain, denies bowel or bladder incontinence. Afebrile   She is just post op. Scheduled Meds:   cefepime  1,000 mg IntraVENous Q24H    vancomycin  1,500 mg IntraVENous Once    vancomycin (VANCOCIN) intermittent dosing (placeholder)   Other RX Placeholder    atorvastatin  80 mg Oral Nightly    allopurinol  100 mg Oral Daily    b complex-C-folic acid  1 mg Oral Nightly    bumetanide  2 mg Oral Once per day on Sun Tue Thu    gabapentin  200 mg Oral TID    hydrALAZINE  10 mg Oral BID    insulin glargine-yfgn  5 Units SubCUTAneous Nightly    isosorbide mononitrate  30 mg Oral Daily    latanoprost  1 drop Right Eye Nightly    pantoprazole  40 mg Oral QAM AC    metoprolol succinate  25 mg Oral Daily    [Held by provider] ticagrelor  90 mg Oral BID    lanthanum  1,000 mg Oral TID WC    brimonidine  1 drop Right Eye BID    And    timolol  1 drop Right Eye BID     Continuous Infusions:   dextrose       PRN Meds:fentanNYL, lidocaine-prilocaine, midodrine, acetaminophen, melatonin, polyethylene glycol, glucose, dextrose, glucagon (rDNA), dextrose, ondansetron, oxyCODONE-acetaminophen    ROS:  As mentioned in subjective, all other systems negative      BP (!) 89/36   Pulse 54   Temp 97.5 °F (36.4 °C) (Oral)   Resp 16   Ht 5' 10\" (1.778 m)   Wt 175 lb 4.3 oz (79.5 kg)   SpO2 100%   BMI 25.15 kg/m²     Physical Exam  Constitutional: The patient is awake, alert, and oriented. Skin: Warm and dry. No jaundice. HEENT: Eyes show round, and reactive pupils. Neck: Supple to movements. No lymphadenopathy. Chest: Clear to auscultation posteriorly  Cardiovascular: S1 and S2 are rhythmic and regular. No murmurs appreciated. Abdomen: Soft nontender  Extremities: No clubbing, no cyanosis, no edema.   Musculoskeletal:  No edema, strength good, AV fistula ok   Tender L spine Neurological: Alert and oriented x 3,   Lines: peripheral    CBC with Differential:      Lab Results   Component Value Date    WBC 5.3 04/18/2022    RBC 3.24 04/18/2022    HGB 8.9 04/18/2022    HCT 30.3 04/18/2022     04/18/2022    MCV 93.5 04/18/2022    MCH 27.5 04/18/2022    MCHC 29.4 04/18/2022    RDW 17.3 04/18/2022    NRBC 0.0 02/18/2017    SEGSPCT 70 03/12/2014    BANDSPCT 1 02/18/2015    LYMPHOPCT 24.2 04/18/2022    PROMYELOPCT 1.8 02/18/2017    MONOPCT 14.0 04/18/2022    MYELOPCT 0.9 10/24/2017    BASOPCT 0.6 04/18/2022    MONOSABS 0.75 04/18/2022    LYMPHSABS 1.29 04/18/2022    EOSABS 0.18 04/18/2022    BASOSABS 0.03 04/18/2022       CMP:    Lab Results   Component Value Date     04/18/2022    K 4.8 04/18/2022    K 4.6 04/13/2022     04/18/2022    CO2 24 04/18/2022    BUN 41 04/18/2022    CREATININE 6.1 04/18/2022    GFRAA 8 04/18/2022    LABGLOM 8 04/18/2022    GLUCOSE 120 04/18/2022    GLUCOSE 46 04/02/2012    PROT 5.9 04/18/2022    LABALBU 2.8 04/18/2022    LABALBU 3.8 04/02/2012    CALCIUM 9.0 04/18/2022    BILITOT 0.3 04/18/2022    ALKPHOS 112 04/18/2022    AST 24 04/18/2022    ALT 27 04/18/2022       Hepatic Function Panel:    Lab Results   Component Value Date    ALKPHOS 112 04/18/2022    ALT 27 04/18/2022    AST 24 04/18/2022    PROT 5.9 04/18/2022    BILITOT 0.3 04/18/2022    BILIDIR <0.2 05/25/2021    IBILI see below 05/25/2021    LABALBU 2.8 04/18/2022    LABALBU 3.8 04/02/2012         Microbiology :  Blood culture - neg to date       Radiology :  CT scan of L spine:    Impression:        New loss of height of L1 inferior endplate with increased soft tissue   inflammation.  Finding is suspicious for possible osteomyelitis.  Further   evaluation with lumbar spine MRI may be considered.            Assessment:  · Lumbar spine osteomyelitis s/p bone biopsy today:      Plan:    · Started her on IV vancomycin (pharmacy to dose)/cefepime 1gr IV q24  · Will follow bone biopsy and cultures. · Will follow with you.      Electronically signed by Meeta Galarza MD on 4/18/2022 at 2:47 PM

## 2022-04-18 NOTE — FLOWSHEET NOTE
04/18/22 0938   Vital Signs   BP (!) 107/59   Temp 98 °F (36.7 °C)   Pulse 58   Resp 18   Pain Assessment   Pain Assessment 0-10   Pain Level 0   Post-Hemodialysis Assessment   Post-Treatment Procedures Blood returned; Access bleeding time < 10 minutes   Machine Disinfection Process Exterior Machine Disinfection   Rinseback Volume (ml) 50 ml   Total Liters Processed (l/min) 64.7 l/min   Dialyzer Clearance Clotted   Duration of Treatment (minutes) 200 minutes   Heparin amount administered during treatment (units) 0 units   Hemodialysis Intake (ml) 50 ml   Hemodialysis Output (ml) 0 ml   NET Removed (ml) 0 ml   Tolerated Treatment Good   Patient Response to Treatment SYSTEM CLOTTED WITH 40 MINUTES REMAINING, ARTERIAL BLOOD RETURNED, UNABLE TO RETURN VENIUS BLOOD   Bilateral Breath Sounds Diminished

## 2022-04-18 NOTE — CARE COORDINATION
4/18 Update CM note. Vancomycin IV and Cefepime IV Q24H continues. Will receive these on scheduled dialysis days. Patient hypotensive, last BP 89/36. Patient on 2L NC. Patient underwent bone bx of L1-L2 with Dr. Nohemi Herrmann today. Cardio states they will request repeat KIARA this admission and possible discussion of CTS cx d/t critical stenosis of the mid RCA. PT/OT from 4/17, 15 and 11/24. 19 [facility] accepted and will start pre-cert today. Form 7000 started. Patient off unit at this time and unable to confirm if transport paperwork is in soft chart.     JULIO BrookeN, RN  [facility] Case Management   Cell: [phone]

## 2022-04-18 NOTE — PROGRESS NOTES
Progress Note  4/18/2022 3:38 PM  Subjective:   Admit Date: 4/13/2022  PCP: Dinh Birch MD  Interval History: patient examined doing well feels ok , had bone bx this am     Diet: No diet orders on file    Data:   Scheduled Meds:   cefepime  1,000 mg IntraVENous Q24H    vancomycin  1,500 mg IntraVENous Once    vancomycin (VANCOCIN) intermittent dosing (placeholder)   Other RX Placeholder    atorvastatin  80 mg Oral Nightly    allopurinol  100 mg Oral Daily    b complex-C-folic acid  1 mg Oral Nightly    bumetanide  2 mg Oral Once per day on Sun Tue Thu    gabapentin  200 mg Oral TID    hydrALAZINE  10 mg Oral BID    insulin glargine-yfgn  5 Units SubCUTAneous Nightly    isosorbide mononitrate  30 mg Oral Daily    latanoprost  1 drop Right Eye Nightly    pantoprazole  40 mg Oral QAM AC    metoprolol succinate  25 mg Oral Daily    [Held by provider] ticagrelor  90 mg Oral BID    lanthanum  1,000 mg Oral TID WC    brimonidine  1 drop Right Eye BID    And    timolol  1 drop Right Eye BID     Continuous Infusions:   dextrose       PRN Meds:fentanNYL, lidocaine-prilocaine, midodrine, acetaminophen, melatonin, polyethylene glycol, glucose, dextrose, glucagon (rDNA), dextrose, ondansetron, oxyCODONE-acetaminophen  I/O last 3 completed shifts: In: 950 [P.O.:950]  Out: 0   I/O this shift:   In: 400 [I.V.:350]  Out: 5 [Blood:5]    Intake/Output Summary (Last 24 hours) at 4/18/2022 1538  Last data filed at 4/18/2022 1159  Gross per 24 hour   Intake 580 ml   Output 5 ml   Net 575 ml     CBC:   Recent Labs     04/16/22  0523 04/17/22  0501 04/18/22  0451   WBC 5.2 4.5 5.3   HGB 8.9* 9.0* 8.9*    321 315     BMP:    Recent Labs     04/16/22  0523 04/17/22  0501 04/18/22  0451    136 135   K 4.1 4.3 4.8    100 100   CO2 23 24 24   BUN 20 31* 41*   CREATININE 3.0* 4.8* 6.1*   GLUCOSE 129* 128* 120*     Hepatic:   Recent Labs     04/16/22  0523 04/17/22  0501 04/18/22  0451   AST 32* 24 24 ALT 28 26 27   BILITOT 0.4 0.3 0.3   ALKPHOS 104 106* 112*     Troponin: No results for input(s): TROPONINI in the last 72 hours. BNP: No results for input(s): BNP in the last 72 hours. Lipids: No results for input(s): CHOL, HDL in the last 72 hours. Invalid input(s): LDLCALCU  ABGs: No results found for: PHART, PO2ART, YDS1GQR  INR: No results for input(s): INR in the last 72 hours. -----------------------------------------------------------------  RAD: CT LUMBAR SPINE WO CONTRAST    Result Date: 4/13/2022  EXAMINATION: CT OF THE LUMBAR SPINE WITHOUT CONTRAST  4/13/2022 TECHNIQUE: CT of the lumbar spine was performed without the administration of intravenous contrast. Multiplanar reformatted images are provided for review. Adjustment of mA and/or kV according to patient size was utilized. Dose modulation, iterative reconstruction, and/or weight based adjustment of the mA/kV was utilized to reduce the radiation dose to as low as reasonably achievable. COMPARISON: CT lumbar spine March 8, 2022 HISTORY: ORDERING SYSTEM PROVIDED HISTORY: pain, no trauma TECHNOLOGIST PROVIDED HISTORY: Reason for exam:->pain, no trauma Decision Support Exception - unselect if not a suspected or confirmed emergency medical condition->Emergency Medical Condition (MA) What reading provider will be dictating this exam?->CRC FINDINGS: BONES/ALIGNMENT: Demonstrated is posterior fixation hardware seen from L3-L5 with decompressive laminectomy. No evidence of hardware complications. L1-L2: There is soft tissue edema adjacent to the L1 disc. There is new loss of height at the L1 inferior endplate extending into the vertebral body when compared to prior exam.  There is pressure free hypertrophy of the ligamentum flavum and protrusion of the intervertebral disc resulting in moderate spinal canal stenosis. Again demonstrated is moderate to severe neural foraminal stenosis.  L2-L3: Again demonstrated is severe loss of height at L2 with anterolisthesis L2 on L3 measuring approximately 1.4 cm. Severe spinal canal stenosis again demonstrated at L2-L3. Severe bilateral neural foraminal stenosis. L3-L4: Status post laminectomy and posterior fixation. Limited evaluation due to hardware streak artifacts. Again demonstrated is severe loss of disc height. Mild neural foraminal stenosis. L4-L5: Status post laminectomy and posterior fixation. Limited evaluation due to hardware streak artifacts. Again demonstrated is severe loss of disc height. Mild neural foraminal stenosis. L5-S1: Mild to moderate moderate to severe loss of disc height with vacuum phenomenon. There is mild spinal canal stenosis. Facet hypertrophy identified. Moderate right and severe left neural foraminal stenosis. SOFT TISSUES/RETROPERITONEUM: No paraspinal mass is seen. New loss of height of L1 inferior endplate with increased soft tissue inflammation. Finding is suspicious for possible osteomyelitis. Further evaluation with lumbar spine MRI may be considered. Objective:   Vitals: BP (!) 89/36   Pulse 54   Temp 97.5 °F (36.4 °C) (Oral)   Resp 16   Ht 5' 10\" (1.778 m)   Wt 175 lb 4.3 oz (79.5 kg)   SpO2 100%   BMI 25.15 kg/m²   General appearance: appears stated age   Skin:  No rashes or lesions  HEENT: Head: Normocephalic, no lesions, without obvious abnormality.   Neck: no adenopathy, no carotid bruit, no JVD, supple, symmetrical, trachea midline and thyroid not enlarged, symmetric, no tenderness/mass/nodules  Lungs: clear to auscultation bilaterally  Heart: regular rate and rhythm, S1, S2 normal, no murmur, click, rub or gallop  Abdomen: soft, non-tender; bowel sounds normal; no masses,  no organomegaly  Extremities: extremities normal, atraumatic, no cyanosis or edema  Neurologic: Mental status: Alert, oriented, thought content appropriate    Assessment:   Patient Active Problem List:     Diabetic retinopathy ([facility] [address]     Malignant neoplasm of right female breast [facility])     Atherosclerosis of native coronary artery of native heart without angina pectoris     Moderate obesity     Left ventricular hypertrophy     Herniated lumbar intervertebral disc     Lumbar degenerative disc disease     Pseudomeningocele     Lumbar radiculopathy     Lymphedema of arm     CKD (chronic kidney disease) stage 4, GFR 15-29 ml/min ([facility])     Insulin dependent type 2 diabetes mellitus (HCC)     Anemia of chronic disease     Chronic diastolic CHF (congestive heart failure) (HCC)     Neuropathy     Hypertension     Glaucoma     Refusal of blood product     Pancytopenia ([address]     Controlled type 2 diabetes mellitus with chronic kidney disease on chronic dialysis, with long-term current use of insulin ([address]     Vitreous hemorrhage ([address]     Patient is Rastafarian     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 ([facility])     Hyperkalemia, diminished renal excretion     Chronic pain syndrome     Lumbar post-laminectomy syndrome     Myalgia     Cervicalgia     Diabetic peripheral neuropathy (HCC)     ESRD (end stage renal disease) (HCC)     Bilateral carpal tunnel syndrome     Spinal stenosis of lumbar region with neurogenic claudication     Cardiac arrest ([address]     ESRD (end stage renal disease) on dialysis ([address]     Mixed hyperlipidemia     Lymphedema of right upper extremity     Coronary artery disease involving native coronary artery of native heart with angina pectoris (HCC)     Ventricular tachycardia (HCC)     Mitral valve disease     CAD in native artery     Lumbar stenosis without neurogenic claudication     Lumbar foraminal stenosis     Back pain     Intractable low back pain     Unable to ambulate    Plan:   1)End-stage kidney disease: Continue hemodialysis Monday Wednesday and Friday via a patent and well-developed left upper arm fistula, had dialysis this am      2) Controlled hypertension with chronic kidney disease     3) Anemia with chronic kidney disease: Transfuse as needed     4) Back pain: Neurosurgery and ID following, s/p Bone biopsy this AM . Await results     Uday Stephenson MD

## 2022-04-18 NOTE — ANESTHESIA PRE PROCEDURE
Department of Anesthesiology  Preprocedure Note       Name:  Rose Song   Age:  78 y.o.  :  1955                                          MRN:  [id number]         Date:  2022      Surgeon: Rahel Bernal):  Neo Baugh MD    Procedure: Procedure(s):  BONE BIOPSY PERCUTANEOUS L1/L2    Medications prior to admission:   Prior to Admission medications    Medication Sig Start Date End Date Taking? Authorizing Provider   midodrine (PROAMATINE) 5 MG tablet Take 5 mg by mouth daily as needed   Yes Historical Provider, MD   gabapentin (NEURONTIN) 100 MG capsule Take 2 capsules by mouth 3 times daily for 180 days.  3/24/22 9/20/22  Berniece Bamberger., MD   atorvastatin (LIPITOR) 80 MG tablet Take 80 mg by mouth nightly    Historical Provider, MD   bumetanide (BUMEX) 2 MG tablet Take 2 mg by mouth three times a week Given ,Tuesday,Thursday    Historical Provider, MD   isosorbide mononitrate (IMDUR) 30 MG extended release tablet Take 30 mg by mouth daily    Historical Provider, MD   insulin glargine (LANTUS) 100 UNIT/ML injection vial Inject 5 Units into the skin nightly     Historical Provider, MD   lidocaine-prilocaine (EMLA) 2.5-2.5 % cream Apply topically as needed for Pain     Historical Provider, MD   hydrALAZINE (APRESOLINE) 10 MG tablet Take 1 tablet by mouth 2 times daily 2/3/22   Stacie Rowan MD   metoprolol succinate (TOPROL XL) 25 MG extended release tablet Take 1 tablet by mouth daily 21   Stacie Rowan MD   lanthanum (FOSRENOL) 1000 MG chewable tablet Take 1,000 mg by mouth 3 times daily (with meals)  21   Historical Provider, MD   allopurinol (ZYLOPRIM) 100 MG tablet Take 1 tablet by mouth daily 21   Stacie Rowan MD   ticagrelor (BRILINTA) 90 MG TABS tablet Take 1 tablet by mouth 2 times daily 21   Stacie Rowan MD   B Complex-C-Folic Acid (BONI CAPS) 1 MG CAPS Take 1 mg by mouth nightly     Historical Provider, MD   omeprazole (PRILOSEC) 20 MG delayed release capsule Take 20 mg by mouth daily as needed     Historical Provider, MD MCHUGH 0.01 % SOLN ophthalmic drops Place 1 drop into the right eye nightly  6/9/20   Historical Provider, MD   COMBIGAN 0.2-0.5 % ophthalmic solution Place 1 drop into the right eye every 12 hours  8/1/19   Historical Provider, MD       Current medications:    Current Facility-Administered Medications   Medication Dose Route Frequency Provider Last Rate Last Admin    lidocaine-EPINEPHrine 1 %-1:[id number] injection    PRN Nely Mendoza MD   2 mL at 04/18/22 1200    cefepime (MAXIPIME) 1000 mg IVPB minibag  1,000 mg IntraVENous Q24H Aayush Philip MD        sodium chloride flush 0.9 % injection 5-40 mL  5-40 mL IntraVENous 2 times per day Randall Robles MD   10 mL at 04/17/22 2011    sodium chloride flush 0.9 % injection 5-40 mL  5-40 mL IntraVENous PRN Randall Robles MD   10 mL at 04/16/22 1155    0.9 % sodium chloride infusion   IntraVENous PRN Randall Robles MD        fentaNYL (SUBLIMAZE) injection 25 mcg  25 mcg IntraVENous Q1H PRN Jen Hart, DO   25 mcg at 04/16/22 1155    atorvastatin (LIPITOR) tablet 80 mg  80 mg Oral Nightly [facility] Madi DO   80 mg at 04/17/22 2011    allopurinol (ZYLOPRIM) tablet 100 mg  100 mg Oral Daily [facility] Madi DO   100 mg at 04/17/22 5085    b complex-C-folic acid (NEPHROCAPS) capsule 1 mg  1 mg Oral Nightly [facility] Madi DO   1 mg at 04/17/22 2010    bumetanide (BUMEX) tablet 2 mg  2 mg Oral Once per day on Sun Tue Thu [facility] TUAN Barraza DO   2 mg at 04/17/22 0550    gabapentin (NEURONTIN) capsule 200 mg  200 mg Oral TID Jen Hart, DO   200 mg at 04/17/22 2010    hydrALAZINE (APRESOLINE) tablet 10 mg  10 mg Oral BID Roro Barraza DO   10 mg at 04/17/22 0931    insulin glargine-yfgn (SEMGLEE-YFGN) injection vial 5 Units  5 Units SubCUTAneous Nightly Roro Barraza DO   5 Units at 04/17/22 2011    isosorbide mononitrate (IMDUR) extended release tablet 30 mg  30 mg Oral Daily [facility] Madi, DO   30 mg at 04/17/22 1351    lidocaine-prilocaine (EMLA) cream   Topical PRN Se Hand, DO        latanoprost (XALATAN) 0.005 % ophthalmic solution 1 drop  1 drop Right Eye Nightly Se Hand, DO   1 drop at 04/17/22 2011    midodrine (PROAMATINE) tablet 5 mg  5 mg Oral Daily PRN Hancock Hand, DO   5 mg at 04/15/22 1619    pantoprazole (PROTONIX) tablet 40 mg  40 mg Oral QAM  Betsy Barraza, DO   40 mg at 04/18/22 0506    metoprolol succinate (TOPROL XL) extended release tablet 25 mg  25 mg Oral Daily Hancock Hand, DO   25 mg at 04/17/22 6568    [Held by provider] ticagrelor (BRILINTA) tablet 90 mg  90 mg Oral BID [facility] Madi DO        acetaminophen (TYLENOL) tablet 650 mg  650 mg Oral Q4H PRN [facility] Madi DO        melatonin tablet 3 mg  3 mg Oral Nightly PRN [facility] Madi DO        polyethylene glycol (GLYCOLAX) packet 17 g  17 g Oral Daily PRN [facility] Madi DO        glucose (GLUTOSE) 40 % oral gel 15 g  15 g Oral PRN [facility] Madi DO        dextrose 50 % IV solution  12.5 g IntraVENous PRN Hancock Hand, DO   12.5 g at 04/14/22 0853    glucagon (rDNA) injection 1 mg  1 mg IntraMUSCular PRN [facility] Madi DO        dextrose 5 % solution  100 mL/hr IntraVENous PRN [facility] Madi DO        lanthanum [facility]) chewable tablet 1,000 mg  1,000 mg Oral TID  Betsy Barraza, DO   1,000 mg at 04/17/22 1807    ondansetron (ZOFRAN) injection 4 mg  4 mg IntraVENous Q6H PRN [facility] Madi DO        brimonidine (ALPHAGAN) 0.2 % ophthalmic solution 1 drop  1 drop Right Eye BID Hancock Hand, DO   1 drop at 04/17/22 2011    And    timolol (TIMOPTIC) 0.5 % ophthalmic solution 1 drop  1 drop Right Eye BID Se Hand, DO   1 drop at 04/17/22 2011    oxyCODONE-acetaminophen (PERCOCET) 5-325 MG per tablet 1 tablet  1 tablet Oral Q4H PRN Hancock Hand, DO   1 tablet at 04/18/22 0456       Allergies: Allergies   Allergen Reactions    Furosemide Swelling and Other (See Comments)     Patient states \"I swelled up like a pig. \" states her kidneys shut down         Problem List:    Patient Active Problem List   Diagnosis Code    Diabetic retinopathy ([facility] E11.319    Malignant neoplasm of right female breast ([facility] [address] [facility] C50.911    Atherosclerosis of native coronary artery of native heart without angina pectoris I25.10    Moderate obesity E66.8    Left ventricular hypertrophy I51.7    Herniated lumbar intervertebral disc M51.26    Lumbar degenerative disc disease M51.36    Pseudomeningocele G96.198    Lumbar radiculopathy M54.16    Lymphedema of arm I89.0    CKD (chronic kidney disease) stage 4, GFR 15-29 ml/min ([facility]) N18.4    Insulin dependent type 2 diabetes mellitus ([facility]) E11.9, Z79.4    Anemia of chronic disease D63.8    Chronic diastolic CHF (congestive heart failure) ([facility]) I50.32    Neuropathy G62.9    Hypertension I10    Glaucoma H40.9    Refusal of blood product Z78.9    Pancytopenia ([facility] [address] [facility] D61.818    Controlled type 2 diabetes mellitus with chronic kidney disease on chronic dialysis, with long-term current use of insulin ([facility]) E11.22, N18.6, Z79.4, Z99.2    Vitreous hemorrhage ([facility] H43.10    Patient is Yarsani Z78.9    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 ([facility] [address] [facility] E11.649    Hyperkalemia, diminished renal excretion E87.5    Chronic pain syndrome G89.4    Lumbar post-laminectomy syndrome M96.1    Myalgia M79.10    Cervicalgia M54.2    Diabetic peripheral neuropathy ([facility]) E11.42    ESRD (end stage renal disease) ([facility]) N18.6    Bilateral carpal tunnel syndrome G56.03    Spinal stenosis of lumbar region with neurogenic claudication M48.062    Cardiac arrest ([facility]) I46.9    ESRD (end stage renal disease) on dialysis ([facility]) N18.6, Z99.2    Mixed hyperlipidemia E78.2    Lymphedema of right upper extremity I89.0    Coronary artery disease involving native coronary artery of native heart with angina pectoris ([facility]) I25.119    Ventricular tachycardia ([facility]) I47.2    Mitral valve disease I05.9    CAD in native artery I25.10    Lumbar stenosis without neurogenic claudication M48.061    Lumbar foraminal stenosis M48.061    Back pain M54.9    Intractable low back pain M54.59    Unable to ambulate R26.2       Past Medical History:        Diagnosis Date    Acute infection of bone ([facility])     infection of rt foot, resolved.     Acute osteomyelitis of phalanx of left hand ([address] 1/27/2022    Left third distal phalanx    Anemia of chronic disease     Arthritis     Breast cancer ([address]     right breast, 2008/ bladder, 2006- last chemotherapy \"years ago\"    CAD (coronary artery disease)     Carpal tunnel syndrome     bilat - for OR left hand 3-17-20     Chronic diastolic CHF (congestive heart failure) ([address] 09/23/2014 9/23/14- echocardiogram revealed moderate LV concentric hypertrophy, stage III diastolic dysfunction, mild tricuspid regurgitation    CKD (chronic kidney disease) stage 4, GFR 15-29 ml/min ([facility])     Diabetic retinopathy ([address]     Glaucoma     Hemodialysis patient ([address]     [facility]- Dr. Cornelio Lambert - left arm fistula     Hyperkalemia, diminished renal excretion 11/9/2017    Hyperlipidemia     Hypertension     Hypoglycemia unawareness in type 1 diabetes mellitus ([address] 11/7/2017    Insulin dependent type 2 diabetes mellitus ([address]     Neuropathy     feet    Osteomyelitis due to secondary diabetes ([address]     rt great toe with amputation    Patient is Zoroastrian 11/7/2017    Refusal of blood product     patient states she dose not take blood transfusion    Ventricular hypertrophy     Vitreous hemorrhage ([address]     left eye       Past Surgical History:        Procedure Laterality Date    AMPUTATION      right great toe    ANKLE SURGERY      correction on charcot joint of right ankle    CARDIAC CATHETERIZATION  12/09/2021    Dr Sergo Villanueva Left 3/17/2020    LEFT CARPAL TUNNEL RELEASE performed by Cahntel Mesa MD at [address]      bilateral    CHOLECYSTECTOMY      COLONOSCOPY      CYSTOSCOPY      DIALYSIS FISTULA CREATION Left 01/31/2018    upper arm/Dr. Chris Mendieta    ECHO COMPL W DOP COLOR FLOW  2/14/2013         ECHO COMPLETE  9/17/2013         FINGER AMPUTATION Left 1/20/2022    AMPUTATION OF LEFT THIRD DIGIT, DISTAL PHALANX performed by Lilibeth Reagan MD at [address]      right    OTHER SURGICAL HISTORY  9/27/2011    PPV, membranectomy, laser Right eye    OTHER SURGICAL HISTORY  insertion lumbar drain insertion    10/12/`14    OTHER SURGICAL HISTORY  10/22/15    percutaneous lead placement for spinal cord stimulator    OTHER SURGICAL HISTORY  11/03/2015    Spinal; cord stimulator- turned off as of 3-10-20     TN AV ANAST,UP ARM BASILIC VEIN TRANSPOSIT Left 5/15/2018    TRANSPOSITION STAGE II AV FISTULA - LEFT UPPER ARM performed by Charlotte Nettles MD at [address] [facility] ANGIOACCESS AV FISTULA Left 9/25/2018    SUPERFICIALIZATION AV FISTULA - LEFT ARM performed by Charlotte Nettles MD at [address] [facility] W/VITRECTOMY ANY METH Left 4/10/2018    PARS PLANA VITRECTOMY 25 GAUGE RETINAL DETACHMENT REPAIR air fluid exchange, endolaser performed by Leyla Ryan MD at [address] TRANSESOPHAGEAL ECHOCARDIOGRAM  11/11/2021    Dr. Sejal Farmer TUNNELED [facility]  11/15/2017    VITRECTOMY Left 04/10/2018    PARS PLANA VITRECTOMY; RETINAL DETACHMENT REPAIR; GAS BUBBLE; LASER LEFT EYE       Social History:    Social History     Tobacco Use    Smoking status: Never Smoker    Smokeless tobacco: Never Used   Substance Use Topics    Alcohol use:  No                                Counseling given: Not Answered      Vital Signs (Current):   Vitals:    04/18/22 3687 04/18/22 0903 04/18/22 0938 04/18/22 1213   BP: (!) 93/28 (!) 95/28 (!) 107/59 (!) 91/38   Pulse: 60 60 58 58   Resp:   18 15   Temp:   36.7 °C (98 °F)    TempSrc:       SpO2:    96%   Weight:       Height:                                                  BP Readings from Last 3 Encounters:   04/18/22 (!) 91/38   04/18/22 (!) 111/43   03/24/22 (!) 103/32       NPO Status:    > 10 hours                                                                             BMI:   Wt Readings from Last 3 Encounters:   04/18/22 175 lb 4.3 oz (79.5 kg)   03/24/22 172 lb (78 kg)   03/16/22 179 lb 10.8 oz (81.5 kg)     Body mass index is 25.15 kg/m². CBC:   Lab Results   Component Value Date    WBC 5.3 04/18/2022    RBC 3.24 04/18/2022    HGB 8.9 04/18/2022    HCT 30.3 04/18/2022    MCV 93.5 04/18/2022    RDW 17.3 04/18/2022     04/18/2022       CMP:   Lab Results   Component Value Date     04/18/2022    K 4.8 04/18/2022    K 4.6 04/13/2022     04/18/2022    CO2 24 04/18/2022    BUN 41 04/18/2022    CREATININE 6.1 04/18/2022    GFRAA 8 04/18/2022    LABGLOM 8 04/18/2022    GLUCOSE 120 04/18/2022    GLUCOSE 46 04/02/2012    PROT 5.9 04/18/2022    CALCIUM 9.0 04/18/2022    BILITOT 0.3 04/18/2022    ALKPHOS 112 04/18/2022    AST 24 04/18/2022    ALT 27 04/18/2022       POC Tests: No results for input(s): POCGLU, POCNA, POCK, POCCL, POCBUN, POCHEMO, POCHCT in the last 72 hours.     Coags:   Lab Results   Component Value Date    PROTIME 11.1 01/20/2022    PROTIME 10.4 02/15/2012    INR 1.0 01/20/2022    APTT 38.0 01/20/2022       HCG (If Applicable): No results found for: PREGTESTUR, PREGSERUM, HCG, HCGQUANT     ABGs: No results found for: PHART, PO2ART, VFQ4WOK, IBQ3GDO, BEART, C3EXPPCO     Type & Screen (If Applicable):  Lab Results   Component Value Date    LABABO O 08/30/2011    [address] POS 08/30/2011       Drug/Infectious Status (If Applicable):  No results found for: HIV, HEPCAB    COVID-19 Screening (If Applicable):   Lab Results   Component Value Date    COVID19 Not Detected 04/01/2022               CXR 5/19/21  FINDINGS:   The lungs are without acute focal process.  There is no effusion or   pneumothorax.  Cardiomegaly.  The osseous structures are without acute   process.  Right-sided port a catheter tip in the SVC.           Impression   No acute process.       Cardiomegaly.                     CARDIAC STRESS TEST 5/20/21  FINDINGS: The overall quality of the study was adequate.       Left ventricular cavity size was noted to be dilated during stress and   rest images       Rotational analog analysis demonstrated show no significant motion   artifact       The gated SPECT stress imaging in the short, vertical long, and   horizontal long axis demonstrated severe defect was present in the   apical wall(s) that was large sized by quantification.              There also was a severe defect present in the septal wall(s) that was   large sized by quantification.       There is also mild defect present in the small inferior apical that   are small in size       There is also a mild defect present in the anterior septal wall that   was small in size       The resting images partially reduced. The anterolateral wall only.       Gated SPECT left ventricular ejection fraction was calculated to be   25%, with apical and septal akinesis, moderate global hypokinesis.         Impression       The myocardial perfusion imaging was abnormal       The abnormality was a large fixed defect in the apical and septal   walls; small fixed defect in the inferoapical wall; partially   reversible defect in the small area of anterolateral wall.       Overall left ventricular systolic function was severely reduced, EF   25% with apical septal akinesis, moderate global hypokinesis.  Dilated   left ventricle during stress and rest.       High risk myocardial perfusion study       Compared to previous study from August 2011 which showed large lateral   wall ischemia with EF 66%.                     CARDIAC CATH 5/21/21  CONCLUSION:  1. Coronary artery disease:  a. Left main:  20% eccentric mid vessel narrowing.  b.  LAD:  50% proximal vessel narrowing and 95% mid vessel stenosis. Trivial diagonal branch with 90% stenosis. c.  LCX:  Occluded after a small caliber first marginal branch which is  a chronic total occlusion. The second larger marginal branch filled  late. d.  RCA:  Large, dominant vessel with 90% heavily calcified mid vessel  stenosis. 50% disease at the level of the crux and 70% ostial stenosis  of the posterior descending artery branch. 2.  Markedly elevated left ventricular end-diastolic pressure. 3.  Successful balloon angioplasty with deployment of drug-eluting  coronary stent to the mid LAD with very good results. 4.  Successful balloon angioplasty with deployment of drug-eluting  coronary stent to the mid RCA with poststent deployment dilatation with  a larger high-pressure noncompliant balloon with good results.               EKG 9/28/21  Sinus  Rhythm   -Poor R-wave progression -nonspecific -consider old anterior infarct. -  Diffuse nonspecific T-abnormality. ABNORMAL                 ECHO 8/24/21 EF 50-55%   Findings      Left Ventricle   Left ventricular size is grossly normal.   Ejection fraction is visually estimated at 50-55%. Biplane EF 58%   Mild left ventricular concentric hypertrophy noted. No regional wall motion abnormalities seen. Left Atrium   The left atrium is mildly to moderately dilated. Mitral Valve   Moderate mitral annular calcification. 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 RVSP is 40 mmHg. Pericardial Effusion   There is a trivial pericardial effusion noted. Conclusions      Summary   Limited study ordered. Left ventricular size is grossly normal.   Ejection fraction is visually estimated at 50-55%. Biplane EF 58%   Mild left ventricular concentric hypertrophy noted. No regional wall motion abnormalities seen. 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 Multiple missing and chipped teeth. Denies loose teeth. Pulmonary:   (+) decreased breath sounds,      (-) not a current smoker          Patient did not smoke on day of surgery. Cardiovascular:  Exercise tolerance: poor (<4 METS),   (+) hypertension:, valvular problems/murmurs (papillary fibroelastoma of mitral valve):, CAD:, CABG/stent (cardiac arrest 5/2021 s/p stent x1):, dysrhythmias: ventricular tachycardia, CHF:, FRENCH: after ambulating 1 flight of stairs,     (-)  angina (last c/p in 5/2021)    ECG reviewed  Rhythm: regular  Rate: normal  Echocardiogram reviewed  Stress test reviewed  Cleared by cardiology     Beta Blocker:  Dose within 24 Hrs      ROS comment: EKG:  Sinus bradycardia 57, Nonspecific T wave abnormality  Abnormal ECG  When compared with ECG of 10-MAR-2022 11:36,  Significant changes have occurred  Confirmed by Ashish Khanna ([id number]) on 4/13/2022 12:43:13 PM    ECHO:   Limited study ordered. Left ventricular size is grossly normal.   Ejection fraction is visually estimated at 50-55%. Biplane EF 58%   Mild left ventricular concentric hypertrophy noted. No regional wall motion abnormalities seen. 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  Mild disease involving left anterior descending artery with a patent stent in the mid segment. 2.  Totally occluded left circumflex artery. 3.  Total occlusion of the second OM branch with left-to-left collaterals.   4.  Significant ostial RCA and significant mid RCA disease as mentioned above.     RECOMMENDATIONS:  Continue current treatment as per CT Surgery. Neuro/Psych:   (+) neuromuscular disease (diabetic retinopathy, vitreous hemorrhage of left eye, glaucoma, herniated lumbar intervertebral disc, DDD, pseudomeningocele, chronic pain, myalgia):,             GI/Hepatic/Renal:   (+) renal disease (last IHD 11/10/21): ESRD and dialysis,           Endo/Other:    (+) Diabetes (right great toe amputation d/t osteomyelitis)Type II DM, poorly controlled, using insulin, blood dyscrasia: anemia:., malignancy/cancer (right breast CA 2008). Abdominal:             Vascular: negative vascular ROS. Other Findings: Right arm lymphedema              Anesthesia Plan      MAC     ASA 4       Induction: intravenous. Anesthetic plan and risks discussed with patient. Use of blood products discussed with whom did not consent to blood products. Special considerations: L[employer]-day. Plan discussed with attending.                   GOYO Rain - RITCHIE   4/18/2022

## 2022-04-18 NOTE — PLAN OF CARE
Problem: Pain:  Goal: Pain level will decrease  Description: Pain level will decrease  4/18/2022 0547 by Luis Felton RN  Outcome: Met This Shift  4/18/2022 0000 by Luis Felton RN  Outcome: Met This Shift  Goal: Control of acute pain  Description: Control of acute pain  4/18/2022 0547 by Luis Felton RN  Outcome: Met This Shift  4/18/2022 0000 by Luis Fleton RN  Outcome: Met This Shift  Goal: Control of chronic pain  Description: Control of chronic pain  4/18/2022 0547 by Luis Felton RN  Outcome: Met This Shift  4/18/2022 0000 by Luis Felton RN  Outcome: Met This Shift     Problem: Falls - Risk of:  Goal: Will remain free from falls  Description: Will remain free from falls  4/18/2022 0547 by Luis Felton RN  Outcome: Met This Shift  4/18/2022 0000 by Luis Felton RN  Outcome: Met This Shift  Goal: Absence of physical injury  Description: Absence of physical injury  4/18/2022 0547 by Luis Felton RN  Outcome: Met This Shift  4/18/2022 0000 by Luis Felton RN  Outcome: Met This Shift

## 2022-04-18 NOTE — ANESTHESIA POSTPROCEDURE EVALUATION
Department of Anesthesiology  Postprocedure Note    Patient: Loy Yoder  MRN: [id number]  YOB: 1955  Date of evaluation: 4/18/2022  Time:  1:45 PM     Procedure Summary     Date: 04/18/22 Room / Location: [facility] 08 / [facility]    Anesthesia Start: 1027 Anesthesia Stop: 1214    Procedure: BONE BIOPSY PERCUTANEOUS L1/L2 (N/A ) Diagnosis: (.)    Surgeons: Dario Felton MD Responsible Provider: Emmett Booth MD    Anesthesia Type: MAC ASA Status: 4          Anesthesia Type: No value filed. Nikki Phase I: Nikki Score: 8    Nikki Phase II:      Last vitals: Reviewed and per EMR flowsheets.        Anesthesia Post Evaluation    Patient location during evaluation: PACU  Patient participation: complete - patient participated  Level of consciousness: awake and alert  Airway patency: patent  Nausea & Vomiting: no nausea and no vomiting  Complications: no  Cardiovascular status: hemodynamically stable and blood pressure returned to baseline  Respiratory status: acceptable  Hydration status: euvolemic

## 2022-04-18 NOTE — BRIEF OP NOTE
Brief Postoperative Note      Patient: Anthony Ivy  YOB: 1955  MRN: [id number]    Date of Procedure: 4/18/2022    Pre-Op Diagnosis: . Osteomyelitis L1 L2  Post-Op Diagnosis: Same       Procedure(s):  BONE BIOPSY PERCUTANEOUS L1/L2    Surgeon(s):  Ben Valdivia MD    Assistant:  * No surgical staff found *    Anesthesia: General    Estimated Blood Loss (mL): Minimal    Complications: None    Specimens:   ID Type Source Tests Collected by Time Destination   1 : lumbar 1&2 cultures Tissue Tissue CULTURE, ANAEROBIC, CULTURE, FUNGUS, GRAM STAIN, CULTURE, SURGICAL, CULTURE WITH SMEAR, ACID FAST BACILLIUS Bob Lerma MD 4/18/2022 1154    A : lumbar 1&2 bone biopsies Tissue Tissue SURGICAL PATHOLOGY Ben Valdivia MD 4/18/2022 1151        Implants:  * No implants in log *      Drains: * No LDAs found *    Findings: As expected    Electronically signed by Ben Valdivia MD on 4/18/2022 at 12:09 PM

## 2022-04-18 NOTE — PROGRESS NOTES
Pharmacy Consultation Note  (Antibiotic Dosing and Monitoring)    Initial consult date: 4-  Consulting physician/provider: Dr. Mary Ellen Hussein  Drug: Vancomycin  Indication: Osteomyelitis (L1-L2)    Age/Gender Height Weight IBW  Allergy Information   67 y.o./female 5' 10\" (177.8 cm) 172 lb (78 kg)     Ideal body weight: 68.5 kg (151 lb 0.2 oz)  Adjusted ideal body weight: 72.9 kg (160 lb 11.4 oz)   Furosemide      Renal Function: ESRD (HD: MWF)     Vancomycin Monitoring:  Trough:  No results for input(s): VANCOTROUGH in the last 72 hours. Random:  No results for input(s): VANCORANDOM in the last 72 hours. Vancomycin Administration Times:  Recent vancomycin administrations      No vancomycin IV orders with administrations found. Orders not given:          vancomycin 1500 mg in dextrose 5% 300 mL IVPB    vancomycin (VANCOCIN) intermittent dosing (placeholder)                Assessment:  · 66 yo/F admitted 4/13 for back pain. Found to have L1-L2 osteo (recent epidural nerve block 1 month PTA). · ATBs held until after Bone Bx (done today, 4/18) which was delayed from 4/14/2022 pending Cards clearance. · S/P HD session this AM (4/18) and Bone Bx. · To dose vancomycin, pharmacy will be utilizing dosing based off of levels due to patient requiring hemodialysis. Plan:  · Give vancomycin 1500 mg x1 today. · Will check vancomycin levels when appropriate. · Will continue to monitor dialysis orders. · Clinical pharmacy to follow.     Aislinn Gallagher, [facility] 4/18/2022 12:37 PM

## 2022-04-18 NOTE — PROGRESS NOTES
Hospitalist Progress Note      SYNOPSIS: Patient admitted on 2022 for Unable to ambulate has a past medical history that includes spinal stenosis, ESRD on dialysis, chronic anemia, hypertension, hyperlipidemia, diabetes mellitus, gout, coronary artery disease, mitral valve papillary fibroblastoma.     Claudia presented to the ER with back pain and issues with ambulation. She states she was unable to get out of bed to go to dialysis today. The patient has significant heart disease including valvular disease that anesthesiology has stated as a barrier to her doing surgery on her back.  Unfortunately, until she is able to ambulate effectively for post surgical rehab, the patient cannot have corrective surgery on her heart either.  Due to this difficult position, the patient was treated about a month ago with a nerve block of her back which was effective for about a month and she was able to do physical therapy. She was recently discharged from SNF on 4/3. However today pain caused patient to be unable to go to dialysis today. She denies any bowel or bladder incontinence.     CT of the lumbar spine showed new loss of height in L1 inferior endplate with increased soft tissue inflammation. Findings suspicious for possible osteomyelitis.     Patient has spinal cord stimulator so is unable to have MRI      SUBJECTIVE:  Stable overnight. No other overnight issues reported. Patient seen and examined  Records reviewed.    S/p bone biopsy  Seen in PACU, still slightly lethargic but does awaken        Temp (24hrs), Av.9 °F (36.6 °C), Min:97.3 °F (36.3 °C), Max:98.2 °F (36.8 °C)    DIET: Diet NPO Exceptions are: Sips of Water with Meds  Diet NPO  CODE: Full Code    Intake/Output Summary (Last 24 hours) at 2022 1226  Last data filed at 2022 1159  Gross per 24 hour   Intake 760 ml   Output 5 ml   Net 755 ml       Review of Systems  All bolded are positive; please see HPI  General:  Fever, chills, LAD and RCA stents 5/2021 and with cath 12/21 showing patent LAD stent, high grade focal in stent restenosis of the RCA and known  of OM2. Per cardio, repeat KIARA with Dr. Rob Mendoza during this admission and then discuss with cardiothoracic surgery. Since she has had no stroke and if indeed there is no mitral valve abnormality of significance then she may just need a RCA PCI. · Mitral valve Papillary fibroelastoma- per cardio,  did not see evidence of the described mitral valve fibroblastoma. There may be a small aortic valve fibroblastoma but the mitral valve abnormality seems to be most likely annular calcification. · End-stage renal disease on hemodialysis- Monday Wednesday Friday, consult nephrology for renal replacement therapy. · Chronic anemia secondary to end-stage renal disease: Monitor H&H  · Essential hypertension: Continue outpatient medications with hydralazine and metoprolol.   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  · Jehovah Witness          Medications:  REVIEWED DAILY    Infusion Medications    sodium chloride      dextrose       Scheduled Medications    cefepime  1,000 mg IntraVENous Q24H    sodium chloride flush  5-40 mL IntraVENous 2 times per day    atorvastatin  80 mg Oral Nightly    allopurinol  100 mg Oral Daily    b complex-C-folic acid  1 mg Oral Nightly    bumetanide  2 mg Oral Once per day on Sun Tue Thu    gabapentin  200 mg Oral TID    hydrALAZINE  10 mg Oral BID    insulin glargine-yfgn  5 Units SubCUTAneous Nightly    isosorbide mononitrate  30 mg Oral Daily    latanoprost  1 drop Right Eye Nightly    pantoprazole  40 mg Oral QAM AC    metoprolol succinate  25 mg Oral Daily    [Held by provider] ticagrelor  90 mg Oral BID    lanthanum  1,000 mg Oral TID     brimonidine  1 drop Right Eye BID    And    timolol  1 drop Right Eye BID     PRN Meds: lidocaine-EPINEPHrine, sodium chloride flush, sodium chloride, fentanNYL, lidocaine-prilocaine, midodrine, acetaminophen, melatonin, polyethylene glycol, glucose, dextrose, glucagon (rDNA), dextrose, ondansetron, oxyCODONE-acetaminophen    Labs:     Recent Labs     04/16/22 0523 04/17/22  0501 04/18/22 0451   WBC 5.2 4.5 5.3   HGB 8.9* 9.0* 8.9*   HCT 30.0* 30.4* 30.3*    321 315       Recent Labs     04/16/22 0523 04/17/22  0501 04/18/22  0451    136 135   K 4.1 4.3 4.8    100 100   CO2 23 24 24   BUN 20 31* 41*   CREATININE 3.0* 4.8* 6.1*   CALCIUM 8.9 9.1 9.0   PHOS  --   --  4.7*       Recent Labs     04/16/22 0523 04/17/22  0501 04/18/22  0451   PROT 6.0* 6.0* 5.9*   ALKPHOS 104 106* 112*   ALT 28 26 27   AST 32* 24 24   BILITOT 0.4 0.3 0.3       No results for input(s): INR in the last 72 hours. No results for input(s): Kathya Basques in the last 72 hours. Chronic labs:    Lab Results   Component Value Date    CHOL 100 04/15/2022    TRIG 107 04/15/2022    HDL 27 04/15/2022    LDLCALC 52 04/15/2022    TSH 1.500 04/15/2022    INR 1.0 01/20/2022    LABA1C 5.5 04/15/2022       Radiology: REVIEWED DAILY    +++++++++++++++++++++++++++++++++++++++++++++++++  DO Nick Samuels Physician - [address] [facility], [place]  +++++++++++++++++++++++++++++++++++++++++++++++++  NOTE: This report was transcribed using voice recognition software. Every effort was made to ensure accuracy; however, inadvertent computerized transcription errors may be present.

## 2022-04-19 ENCOUNTER — APPOINTMENT (OUTPATIENT)
Dept: CARDIAC CATH/INVASIVE PROCEDURES | Age: 67
DRG: 477 | End: 2022-04-19
Payer: COMMERCIAL

## 2022-04-19 ENCOUNTER — ANESTHESIA (OUTPATIENT)
Dept: CARDIAC CATH/INVASIVE PROCEDURES | Age: 67
DRG: 477 | End: 2022-04-19
Payer: COMMERCIAL

## 2022-04-19 ENCOUNTER — ANESTHESIA EVENT (OUTPATIENT)
Dept: CARDIAC CATH/INVASIVE PROCEDURES | Age: 67
DRG: 477 | End: 2022-04-19
Payer: COMMERCIAL

## 2022-04-19 VITALS
OXYGEN SATURATION: 95 % | SYSTOLIC BLOOD PRESSURE: 143 MMHG | RESPIRATION RATE: 23 BRPM | DIASTOLIC BLOOD PRESSURE: 51 MMHG

## 2022-04-19 LAB
GRAM STAIN ORDERABLE: NORMAL
HCT VFR BLD CALC: 28.6 % (ref 34–48)
HEMOGLOBIN: 8.6 G/DL (ref 11.5–15.5)
LV EF: 43 %
LVEF MODALITY: NORMAL
MCH RBC QN AUTO: 27.4 PG (ref 26–35)
MCHC RBC AUTO-ENTMCNC: 30.1 % (ref 32–34.5)
MCV RBC AUTO: 91.1 FL (ref 80–99.9)
METER GLUCOSE: 56 MG/DL (ref 74–99)
METER GLUCOSE: 90 MG/DL (ref 74–99)
PDW BLD-RTO: 17.3 FL (ref 11.5–15)
PLATELET # BLD: 297 E9/L (ref 130–450)
PMV BLD AUTO: 10 FL (ref 7–12)
RBC # BLD: 3.14 E12/L (ref 3.5–5.5)
VANCOMYCIN RANDOM: 15.1 MCG/ML (ref 5–40)
WBC # BLD: 5.9 E9/L (ref 4.5–11.5)

## 2022-04-19 PROCEDURE — 2709999900 HC NON-CHARGEABLE SUPPLY

## 2022-04-19 PROCEDURE — 2580000003 HC RX 258: Performed by: NEUROLOGICAL SURGERY

## 2022-04-19 PROCEDURE — 6360000002 HC RX W HCPCS: Performed by: NURSE ANESTHETIST, CERTIFIED REGISTERED

## 2022-04-19 PROCEDURE — 6370000000 HC RX 637 (ALT 250 FOR IP): Performed by: INTERNAL MEDICINE

## 2022-04-19 PROCEDURE — 2060000000 HC ICU INTERMEDIATE R&B

## 2022-04-19 PROCEDURE — 85027 COMPLETE CBC AUTOMATED: CPT

## 2022-04-19 PROCEDURE — 3700000001 HC ADD 15 MINUTES (ANESTHESIA)

## 2022-04-19 PROCEDURE — 6370000000 HC RX 637 (ALT 250 FOR IP): Performed by: NEUROLOGICAL SURGERY

## 2022-04-19 PROCEDURE — 36415 COLL VENOUS BLD VENIPUNCTURE: CPT

## 2022-04-19 PROCEDURE — 80202 ASSAY OF VANCOMYCIN: CPT

## 2022-04-19 PROCEDURE — 2580000003 HC RX 258: Performed by: NURSE ANESTHETIST, CERTIFIED REGISTERED

## 2022-04-19 PROCEDURE — 2580000003 HC RX 258: Performed by: STUDENT IN AN ORGANIZED HEALTH CARE EDUCATION/TRAINING PROGRAM

## 2022-04-19 PROCEDURE — 2500000003 HC RX 250 WO HCPCS: Performed by: NURSE ANESTHETIST, CERTIFIED REGISTERED

## 2022-04-19 PROCEDURE — B24BZZ4 ULTRASONOGRAPHY OF HEART WITH AORTA, TRANSESOPHAGEAL: ICD-10-PCS | Performed by: INTERNAL MEDICINE

## 2022-04-19 PROCEDURE — 93312 ECHO TRANSESOPHAGEAL: CPT

## 2022-04-19 PROCEDURE — 6360000002 HC RX W HCPCS: Performed by: STUDENT IN AN ORGANIZED HEALTH CARE EDUCATION/TRAINING PROGRAM

## 2022-04-19 PROCEDURE — 93325 DOPPLER ECHO COLOR FLOW MAPG: CPT

## 2022-04-19 PROCEDURE — 3700000000 HC ANESTHESIA ATTENDED CARE

## 2022-04-19 PROCEDURE — 82962 GLUCOSE BLOOD TEST: CPT

## 2022-04-19 PROCEDURE — 93321 DOPPLER ECHO F-UP/LMTD STD: CPT

## 2022-04-19 RX ORDER — SODIUM CHLORIDE 9 MG/ML
INJECTION, SOLUTION INTRAVENOUS CONTINUOUS PRN
Status: DISCONTINUED | OUTPATIENT
Start: 2022-04-19 | End: 2022-04-19 | Stop reason: SDUPTHER

## 2022-04-19 RX ORDER — HEPARIN SODIUM (PORCINE) LOCK FLUSH IV SOLN 100 UNIT/ML 100 UNIT/ML
100 SOLUTION INTRAVENOUS PRN
Status: DISCONTINUED | OUTPATIENT
Start: 2022-04-19 | End: 2022-04-19

## 2022-04-19 RX ORDER — PROPOFOL 10 MG/ML
INJECTION, EMULSION INTRAVENOUS PRN
Status: DISCONTINUED | OUTPATIENT
Start: 2022-04-19 | End: 2022-04-19 | Stop reason: SDUPTHER

## 2022-04-19 RX ORDER — SODIUM CHLORIDE 0.9 % (FLUSH) 0.9 %
10 SYRINGE (ML) INJECTION PRN
Status: DISCONTINUED | OUTPATIENT
Start: 2022-04-19 | End: 2022-04-22 | Stop reason: HOSPADM

## 2022-04-19 RX ORDER — ETOMIDATE 2 MG/ML
INJECTION INTRAVENOUS PRN
Status: DISCONTINUED | OUTPATIENT
Start: 2022-04-19 | End: 2022-04-19 | Stop reason: SDUPTHER

## 2022-04-19 RX ADMIN — OXYCODONE AND ACETAMINOPHEN 1 TABLET: 5; 325 TABLET ORAL at 22:03

## 2022-04-19 RX ADMIN — ETOMIDATE 6 MG: 2 INJECTION, SOLUTION INTRAVENOUS at 12:48

## 2022-04-19 RX ADMIN — TIMOLOL MALEATE 1 DROP: 5 SOLUTION OPHTHALMIC at 21:19

## 2022-04-19 RX ADMIN — ETOMIDATE 2 MG: 2 INJECTION, SOLUTION INTRAVENOUS at 12:58

## 2022-04-19 RX ADMIN — ETOMIDATE 2 MG: 2 INJECTION, SOLUTION INTRAVENOUS at 13:10

## 2022-04-19 RX ADMIN — ETOMIDATE 2 MG: 2 INJECTION, SOLUTION INTRAVENOUS at 13:05

## 2022-04-19 RX ADMIN — ATORVASTATIN CALCIUM 80 MG: 40 TABLET, FILM COATED ORAL at 21:19

## 2022-04-19 RX ADMIN — LATANOPROST 1 DROP: 50 SOLUTION OPHTHALMIC at 21:19

## 2022-04-19 RX ADMIN — SODIUM CHLORIDE, PRESERVATIVE FREE 10 ML: 5 INJECTION INTRAVENOUS at 18:20

## 2022-04-19 RX ADMIN — ETOMIDATE 2 MG: 2 INJECTION, SOLUTION INTRAVENOUS at 12:53

## 2022-04-19 RX ADMIN — NEPHROCAP 1 MG: 1 CAP ORAL at 21:19

## 2022-04-19 RX ADMIN — OXYCODONE AND ACETAMINOPHEN 1 TABLET: 5; 325 TABLET ORAL at 17:02

## 2022-04-19 RX ADMIN — ETOMIDATE 2 MG: 2 INJECTION, SOLUTION INTRAVENOUS at 13:01

## 2022-04-19 RX ADMIN — GABAPENTIN 200 MG: 100 CAPSULE ORAL at 15:21

## 2022-04-19 RX ADMIN — CEFEPIME HYDROCHLORIDE 1000 MG: 1 INJECTION, POWDER, FOR SOLUTION INTRAMUSCULAR; INTRAVENOUS at 18:18

## 2022-04-19 RX ADMIN — BRIMONIDINE TARTRATE 1 DROP: 2 SOLUTION/ DROPS OPHTHALMIC at 21:19

## 2022-04-19 RX ADMIN — GABAPENTIN 200 MG: 100 CAPSULE ORAL at 21:19

## 2022-04-19 RX ADMIN — LANTHANUM CARBONATE 1000 MG: 500 TABLET, CHEWABLE ORAL at 17:02

## 2022-04-19 RX ADMIN — SODIUM CHLORIDE: 9 INJECTION, SOLUTION INTRAVENOUS at 12:45

## 2022-04-19 RX ADMIN — PROPOFOL 110 MG: 10 INJECTION, EMULSION INTRAVENOUS at 12:48

## 2022-04-19 RX ADMIN — HYDRALAZINE HYDROCHLORIDE 10 MG: 10 TABLET, FILM COATED ORAL at 17:02

## 2022-04-19 RX ADMIN — ETOMIDATE 2 MG: 2 INJECTION, SOLUTION INTRAVENOUS at 13:15

## 2022-04-19 ASSESSMENT — PAIN SCALES - GENERAL
PAINLEVEL_OUTOF10: 4
PAINLEVEL_OUTOF10: 3
PAINLEVEL_OUTOF10: 5
PAINLEVEL_OUTOF10: 7
PAINLEVEL_OUTOF10: 8
PAINLEVEL_OUTOF10: 4

## 2022-04-19 ASSESSMENT — PAIN DESCRIPTION - PAIN TYPE
TYPE: ACUTE PAIN;CHRONIC PAIN

## 2022-04-19 ASSESSMENT — PAIN DESCRIPTION - PROGRESSION
CLINICAL_PROGRESSION: NOT CHANGED

## 2022-04-19 ASSESSMENT — PAIN DESCRIPTION - DESCRIPTORS
DESCRIPTORS: ACHING;CONSTANT;DISCOMFORT
DESCRIPTORS: ACHING;CONSTANT;DISCOMFORT;SORE
DESCRIPTORS: ACHING;CONSTANT;DISCOMFORT
DESCRIPTORS: ACHING;DISCOMFORT;SHOOTING;SORE

## 2022-04-19 ASSESSMENT — PAIN DESCRIPTION - LOCATION
LOCATION: BACK;LEG
LOCATION: BACK

## 2022-04-19 ASSESSMENT — PAIN DESCRIPTION - ONSET
ONSET: ON-GOING

## 2022-04-19 ASSESSMENT — PAIN DESCRIPTION - ORIENTATION
ORIENTATION: RIGHT
ORIENTATION: LOWER
ORIENTATION: RIGHT

## 2022-04-19 ASSESSMENT — PAIN DESCRIPTION - FREQUENCY
FREQUENCY: CONTINUOUS
FREQUENCY: CONTINUOUS
FREQUENCY: INTERMITTENT
FREQUENCY: CONTINUOUS

## 2022-04-19 ASSESSMENT — LIFESTYLE VARIABLES: SMOKING_STATUS: 0

## 2022-04-19 ASSESSMENT — ENCOUNTER SYMPTOMS: DYSPNEA ACTIVITY LEVEL: AFTER AMBULATING 1 FLIGHT OF STAIRS

## 2022-04-19 NOTE — PLAN OF CARE
Problem: Falls - Risk of:  Goal: Will remain free from falls  Description: Will remain free from falls  Outcome: Met This Shift     Problem: Mobility - Impaired:  Goal: Mobility will improve  Description: Mobility will improve  Outcome: Met This Shift     Problem: Skin Integrity:  Goal: Will show no infection signs and symptoms  Description: Will show no infection signs and symptoms  Outcome: Met This Shift

## 2022-04-19 NOTE — CARE COORDINATION
Patient auth obtained for aditi Love through tomorrow 4/20. For KIARA today. QHD vanco and cefepime continues. HENS pending and ambulance on soft chart. Updated patient on approval, she remains in agreement with Teresa at discharge. For questions I can be reached at [phone].  Lori Gonzalez [place]

## 2022-04-19 NOTE — OP NOTE
[address]                  [address] [facility],  [place]                                OPERATIVE REPORT    PATIENT NAME: Stefano Cross                    :        1955  MED REC NO:   [id number]                            ROOM:       [facility]  ACCOUNT NO:   [de-identified]                           ADMIT DATE: 2022  PROVIDER:     Deonte Hussein MD    DATE OF PROCEDURE:  2022    SURGEON:  Deonte Hussein MD    PREOPERATIVE DIAGNOSES:  Osteomyelitis L1-L2 and status post posterior  lumbar interbody fusion. POSTOPERATIVE DIAGNOSES:  Osteomyelitis L1-L2 and status post posterior  lumbar interbody fusion. PROCEDURES:  Percutaneous biopsy of L1 and percutaneous biopsy of L2.    TECHNIQUE:  The patient was placed on the operating room table in supine  position and after satisfactory monitoring has been instituted by the  Anesthesia Department, the patient was sedated to some extent. The  patient was turned into prone position on the operating room table. Lower back was prepared with Betadine scrub and solution and routinely  draped. Using AP and lateral fluoroscopy, the level of L1 and L2 were  marked on the back. A point which was 3 cm from the midline on the  right side at the level of L1-L2 infiltrated with 1% lidocaine solution. A 3-mm incision made over this point. Through this, a trocar and  cannula from the Kyphon kit were inserted under fluoroscopic guidance  through the pedicle of L1 into body of L1. As the tip of the needle was  lying at the junction of the pedicle and the body, the trocar was  removed. Bone biopsy needle was inserted and advanced into the body of  L1. A piece of bone was taken, which was sent to Pathology. Then, a  piece of bone in the similar fashion was removed, which was sent to  Bacteriology.     Then moving to the level L2 in a similar fashion, a point which was 3 cm  from the midline was infiltrated with 1% lidocaine solution. A 3-mm  incision made over this point. Through this, a trocar and cannula  followed by the biopsy needle. The biopsy needle was inserted and  advanced into the body of L2 and a piece of bone was taken, was sent to  Pathology and also a piece was sent to Bacteriology. Then, the needle  was removed. Steri-Strip applied over the area of insertion of needle  and the patient sent to recovery room in satisfactory state. The  patient tolerated the procedure well. The estimated blood loss was  none.         Kelly Parada MD    D: 04/18/2022 12:25:33       T: 04/18/2022 17:28:20     [email or web address]  Job#: [id number]     Doc#: [id number]    CC:

## 2022-04-19 NOTE — PROGRESS NOTES
Hospitalist Progress Note      SYNOPSIS: Patient admitted on 2022 for Unable to ambulate has a past medical history that includes spinal stenosis, ESRD on dialysis, chronic anemia, hypertension, hyperlipidemia, diabetes mellitus, gout, coronary artery disease, mitral valve papillary fibroblastoma.     Claudia presented to the ER with back pain and issues with ambulation. She states she was unable to get out of bed to go to dialysis today. The patient has significant heart disease including valvular disease that anesthesiology has stated as a barrier to her doing surgery on her back.  Unfortunately, until she is able to ambulate effectively for post surgical rehab, the patient cannot have corrective surgery on her heart either.  Due to this difficult position, the patient was treated about a month ago with a nerve block of her back which was effective for about a month and she was able to do physical therapy. She was recently discharged from SNF on 4/3. However today pain caused patient to be unable to go to dialysis today. She denies any bowel or bladder incontinence.     CT of the lumbar spine showed new loss of height in L1 inferior endplate with increased soft tissue inflammation. Findings suspicious for possible osteomyelitis.     Patient has spinal cord stimulator so is unable to have MRI      SUBJECTIVE:  Stable overnight. No other overnight issues reported. Records reviewed.    S/p bone biopsy   Attempted to see patient x3, patient getting KIARA  Started on Abx yesterday           Temp (24hrs), Av.8 °F (36.6 °C), Min:97.5 °F (36.4 °C), Max:98 °F (36.7 °C)    DIET: Diet NPO  CODE: Full Code    Intake/Output Summary (Last 24 hours) at 2022 0907  Last data filed at 2022 1835  Gross per 24 hour   Intake 458.35 ml   Output 5 ml   Net 453.35 ml              OBJECTIVE:    BP (!) 107/31   Pulse 69   Temp 97.9 °F (36.6 °C) (Oral)   Resp 16   Ht 5' 10\" (1.778 m)   Wt 175 lb 4.3 oz (79.5 kg)   SpO2 95%   BMI 25.15 kg/m²          ASSESSMENT and PLAN:  · Back pain with spinal stenosis and possible osteomyelitis of lumbar spine -Neurosurgery consultation. Pain control. ID consulted. Patient was unable to get MRI due to stimulator. S/p bone biopsy 4/18. Started on cefepime/vanc  · CAD- with h/o LAD and RCA stents 5/2021 and with cath 12/21 showing patent LAD stent, high grade focal in stent restenosis of the RCA and known  of OM2. Per cardio, repeat KIARA with Dr. Ian Sánchez during this admission and then discuss with cardiothoracic surgery. Since she has had no stroke and if indeed there is no mitral valve abnormality of significance then she may just need a RCA PCI. · Mitral valve Papillary fibroelastoma- per cardio,  did not see evidence of the described mitral valve fibroblastoma. There may be a small aortic valve fibroblastoma but the mitral valve abnormality seems to be most likely annular calcification. · End-stage renal disease on hemodialysis- Monday Wednesday Friday, consult nephrology for renal replacement therapy. · Chronic anemia secondary to end-stage renal disease: Monitor H&H  · Essential hypertension: Continue outpatient medications with hydralazine and metoprolol.   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  · Jehovah Witness          Medications:  REVIEWED DAILY    Infusion Medications    dextrose       Scheduled Medications    cefepime  1,000 mg IntraVENous Q24H    vancomycin (VANCOCIN) intermittent dosing (placeholder)   Other RX Placeholder    atorvastatin  80 mg Oral Nightly    allopurinol  100 mg Oral Daily    b complex-C-folic acid  1 mg Oral Nightly    bumetanide  2 mg Oral Once per day on Sun Tue Thu    gabapentin  200 mg Oral TID    hydrALAZINE  10 mg Oral BID    insulin glargine-yfgn  5 Units SubCUTAneous Nightly    isosorbide mononitrate  30 mg Oral Daily    latanoprost  1 drop Right Eye Nightly    pantoprazole  40 mg Oral QAM AC    metoprolol succinate  25 mg Oral Daily    [Held by provider] ticagrelor  90 mg Oral BID    lanthanum  1,000 mg Oral TID WC    brimonidine  1 drop Right Eye BID    And    timolol  1 drop Right Eye BID     PRN Meds: sodium chloride flush, fentanNYL, lidocaine-prilocaine, midodrine, acetaminophen, melatonin, polyethylene glycol, glucose, dextrose, glucagon (rDNA), dextrose, ondansetron, oxyCODONE-acetaminophen    Labs:     Recent Labs     04/17/22  0501 04/18/22  0451 04/19/22  0513   WBC 4.5 5.3 5.9   HGB 9.0* 8.9* 8.6*   HCT 30.4* 30.3* 28.6*    315 297       Recent Labs     04/17/22  0501 04/18/22  0451    135   K 4.3 4.8    100   CO2 24 24   BUN 31* 41*   CREATININE 4.8* 6.1*   CALCIUM 9.1 9.0   PHOS  --  4.7*       Recent Labs     04/17/22  0501 04/18/22  0451   PROT 6.0* 5.9*   ALKPHOS 106* 112*   ALT 26 27   AST 24 24   BILITOT 0.3 0.3       No results for input(s): INR in the last 72 hours. No results for input(s): Kathya Basques in the last 72 hours. Chronic labs:    Lab Results   Component Value Date    CHOL 100 04/15/2022    TRIG 107 04/15/2022    HDL 27 04/15/2022    LDLCALC 52 04/15/2022    TSH 1.500 04/15/2022    INR 1.0 01/20/2022    LABA1C 5.5 04/15/2022       Radiology: REVIEWED DAILY    +++++++++++++++++++++++++++++++++++++++++++++++++  DO Nick Samuels Physician - [facility], [place]  +++++++++++++++++++++++++++++++++++++++++++++++++  NOTE: This report was transcribed using voice recognition software. Every effort was made to ensure accuracy; however, inadvertent computerized transcription errors may be present.

## 2022-04-19 NOTE — PROCEDURES
PRELIMINARY TRANSESOPHAGEAL ECHOCARDIOGRAPHY REPORT    Date of Procedure: 4/19/2022    Indication:  Preop    Sedation: Propofol    Complications: None    Preliminary findings:  Mild LV dysfunction  Annular, valvular, and subvalvular mitral calcification with slight mobile component  Aortic valve calcification   No definitive PFE of mitral or aortic valve    Full report to follow    Moise Jacobsen MD, [facility] Cardiology

## 2022-04-19 NOTE — ANESTHESIA POSTPROCEDURE EVALUATION
Department of Anesthesiology  Postprocedure Note    Patient: Anthony Ivy  MRN: [id number]  YOB: 1955  Date of evaluation: 4/19/2022  Time:  1:36 PM     Procedure Summary     Date: 04/19/22 Room / Location: [facility]; [facility]    Anesthesia Start: 7830 Anesthesia Stop: 1320    Procedure: SEY KIARA Diagnosis:     Scheduled Providers: GOYO Edwards CRNA; Vasyl Bruce MD Responsible Provider: Vasyl Bruce MD    Anesthesia Type: MAC ASA Status: 4          Anesthesia Type: MAC    Nikki Phase I: Nikki Score: 8    Nikki Phase II:      Last vitals: Reviewed and per EMR flowsheets.        Anesthesia Post Evaluation    Patient location during evaluation: PACU  Patient participation: complete - patient participated  Level of consciousness: awake and alert  Airway patency: patent  Nausea & Vomiting: no nausea and no vomiting  Complications: no  Cardiovascular status: hemodynamically stable and blood pressure returned to baseline  Respiratory status: acceptable  Hydration status: euvolemic

## 2022-04-19 NOTE — PROGRESS NOTES
Progress Note  4/19/2022 9:53 AM  Subjective:   Admit Date: 4/13/2022  PCP: Heriberto Gordon MD  Interval History: patient seen , doing ok feels ok     Diet: Diet NPO    Data:   Scheduled Meds:   cefepime  1,000 mg IntraVENous Q24H    vancomycin (VANCOCIN) intermittent dosing (placeholder)   Other RX Placeholder    atorvastatin  80 mg Oral Nightly    allopurinol  100 mg Oral Daily    b complex-C-folic acid  1 mg Oral Nightly    bumetanide  2 mg Oral Once per day on Sun Tue Thu    gabapentin  200 mg Oral TID    hydrALAZINE  10 mg Oral BID    insulin glargine-yfgn  5 Units SubCUTAneous Nightly    isosorbide mononitrate  30 mg Oral Daily    latanoprost  1 drop Right Eye Nightly    pantoprazole  40 mg Oral QAM AC    metoprolol succinate  25 mg Oral Daily    [Held by provider] ticagrelor  90 mg Oral BID    lanthanum  1,000 mg Oral TID WC    brimonidine  1 drop Right Eye BID    And    timolol  1 drop Right Eye BID     Continuous Infusions:   dextrose       PRN Meds:sodium chloride flush, fentanNYL, lidocaine-prilocaine, midodrine, acetaminophen, melatonin, polyethylene glycol, glucose, dextrose, glucagon (rDNA), dextrose, ondansetron, oxyCODONE-acetaminophen  I/O last 3 completed shifts: In: 638.4 [P.O.:180; I.V.:360; IV Piggyback:48.4]  Out: 5 [Blood:5]  No intake/output data recorded.     Intake/Output Summary (Last 24 hours) at 4/19/2022 0953  Last data filed at 4/18/2022 1835  Gross per 24 hour   Intake 408.35 ml   Output 5 ml   Net 403.35 ml     CBC:   Recent Labs     04/17/22  0501 04/18/22 0451 04/19/22  0513   WBC 4.5 5.3 5.9   HGB 9.0* 8.9* 8.6*    315 297     BMP:    Recent Labs     04/17/22  0501 04/18/22 0451    135   K 4.3 4.8    100   CO2 24 24   BUN 31* 41*   CREATININE 4.8* 6.1*   GLUCOSE 128* 120*     Hepatic:   Recent Labs     04/17/22  0501 04/18/22  0451   AST 24 24   ALT 26 27   BILITOT 0.3 0.3   ALKPHOS 106* 112*     Troponin: No results for input(s): TROPONINI in the last 72 hours. BNP: No results for input(s): BNP in the last 72 hours. Lipids: No results for input(s): CHOL, HDL in the last 72 hours. Invalid input(s): LDLCALCU  ABGs: No results found for: PHART, PO2ART, YVN0DQP  INR: No results for input(s): INR in the last 72 hours. -----------------------------------------------------------------  RAD: CT LUMBAR SPINE WO CONTRAST    Result Date: 4/13/2022  EXAMINATION: CT OF THE LUMBAR SPINE WITHOUT CONTRAST  4/13/2022 TECHNIQUE: CT of the lumbar spine was performed without the administration of intravenous contrast. Multiplanar reformatted images are provided for review. Adjustment of mA and/or kV according to patient size was utilized. Dose modulation, iterative reconstruction, and/or weight based adjustment of the mA/kV was utilized to reduce the radiation dose to as low as reasonably achievable. COMPARISON: CT lumbar spine March 8, 2022 HISTORY: ORDERING SYSTEM PROVIDED HISTORY: pain, no trauma TECHNOLOGIST PROVIDED HISTORY: Reason for exam:->pain, no trauma Decision Support Exception - unselect if not a suspected or confirmed emergency medical condition->Emergency Medical Condition (MA) What reading provider will be dictating this exam?->CRC FINDINGS: BONES/ALIGNMENT: Demonstrated is posterior fixation hardware seen from L3-L5 with decompressive laminectomy. No evidence of hardware complications. L1-L2: There is soft tissue edema adjacent to the L1 disc. There is new loss of height at the L1 inferior endplate extending into the vertebral body when compared to prior exam.  There is pressure free hypertrophy of the ligamentum flavum and protrusion of the intervertebral disc resulting in moderate spinal canal stenosis. Again demonstrated is moderate to severe neural foraminal stenosis. L2-L3: Again demonstrated is severe loss of height at L2 with anterolisthesis L2 on L3 measuring approximately 1.4 cm.  Severe spinal canal stenosis again demonstrated at L2-L3. Severe bilateral neural foraminal stenosis. L3-L4: Status post laminectomy and posterior fixation. Limited evaluation due to hardware streak artifacts. Again demonstrated is severe loss of disc height. Mild neural foraminal stenosis. L4-L5: Status post laminectomy and posterior fixation. Limited evaluation due to hardware streak artifacts. Again demonstrated is severe loss of disc height. Mild neural foraminal stenosis. L5-S1: Mild to moderate moderate to severe loss of disc height with vacuum phenomenon. There is mild spinal canal stenosis. Facet hypertrophy identified. Moderate right and severe left neural foraminal stenosis. SOFT TISSUES/RETROPERITONEUM: No paraspinal mass is seen. New loss of height of L1 inferior endplate with increased soft tissue inflammation. Finding is suspicious for possible osteomyelitis. Further evaluation with lumbar spine MRI may be considered. Objective:   Vitals: BP (!) 107/31   Pulse 69   Temp 97.9 °F (36.6 °C) (Oral)   Resp 16   Ht 5' 10\" (1.778 m)   Wt 175 lb 4.3 oz (79.5 kg)   SpO2 95%   BMI 25.15 kg/m²   General appearance: appears stated age   Skin:  No rashes or lesions  HEENT: Head: Normocephalic, no lesions, without obvious abnormality.   Neck: no adenopathy, no carotid bruit, no JVD, supple, symmetrical, trachea midline and thyroid not enlarged, symmetric, no tenderness/mass/nodules  Lungs: clear to auscultation bilaterally  Heart: regular rate and rhythm, S1, S2 normal, no murmur, click, rub or gallop  Abdomen: soft, non-tender; bowel sounds normal; no masses,  no organomegaly  Extremities: extremities normal, atraumatic, no cyanosis or edema  Neurologic: Mental status: Alert, oriented, thought content appropriate    Assessment:   Patient Active Problem List:     Diabetic retinopathy ([address]     Malignant neoplasm of right female breast ([address]     Atherosclerosis of native coronary artery of native heart without angina pectoris     Moderate obesity     Left ventricular hypertrophy     Herniated lumbar intervertebral disc     Lumbar degenerative disc disease     Pseudomeningocele     Lumbar radiculopathy     Lymphedema of arm     CKD (chronic kidney disease) stage 4, GFR 15-29 ml/min ([facility])     Insulin dependent type 2 diabetes mellitus (HCC)     Anemia of chronic disease     Chronic diastolic CHF (congestive heart failure) ([facility])     Neuropathy     Hypertension     Glaucoma     Refusal of blood product     Pancytopenia ([address]     Controlled type 2 diabetes mellitus with chronic kidney disease on chronic dialysis, with long-term current use of insulin ([facility])     Vitreous hemorrhage ([address]     Patient is Scientologist     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 ([facility])     Hyperkalemia, diminished renal excretion     Chronic pain syndrome     Lumbar post-laminectomy syndrome     Myalgia     Cervicalgia     Diabetic peripheral neuropathy ([facility])     ESRD (end stage renal disease) ([facility])     Bilateral carpal tunnel syndrome     Spinal stenosis of lumbar region with neurogenic claudication     Cardiac arrest ([address]     ESRD (end stage renal disease) on dialysis ([address]     Mixed hyperlipidemia     Lymphedema of right upper extremity     Coronary artery disease involving native coronary artery of native heart with angina pectoris (HCC)     Ventricular tachycardia ([facility])     Mitral valve disease     CAD in native artery     Lumbar stenosis without neurogenic claudication     Lumbar foraminal stenosis     Back pain     Intractable low back pain     Unable to ambulate    Plan:     1)End-stage kidney disease: Continue hemodialysis Monday Wednesday and Friday via a patent and well-developed left upper arm fistula, dialysis as planned      2) Controlled hypertension with chronic kidney disease     3) Anemia with chronic kidney disease: Transfuse as needed     4) Back pain: Neurosurgery and ID following, s/p Bone biopsy  .  Await results        Hasit Rich Aase, MD

## 2022-04-19 NOTE — PROGRESS NOTES
Pharmacy Consultation Note  (Antibiotic Dosing and Monitoring)    Initial consult date: 4-  Consulting physician/provider: Dr. Artemio Braga  Drug: Vancomycin  Indication: Osteomyelitis (L1-L2)    Age/Gender Height Weight IBW  Allergy Information   67 y.o./female 5' 10\" (177.8 cm) 172 lb (78 kg)     Ideal body weight: 68.5 kg (151 lb 0.2 oz)  Adjusted ideal body weight: 72.9 kg (160 lb 11.4 oz)   Furosemide      Renal Function: ESRD (HD: MWF)     Vancomycin Monitoring:  Trough:  No results for input(s): VANCOTROUGH in the last 72 hours. Random:    Recent Labs     04/19/22  0510   VANCORANDOM 15.1       Vancomycin Administration Times:  Recent vancomycin administrations                   vancomycin 1500 mg in dextrose 5% 300 mL IVPB (mg) 1,500 mg New Bag 04/18/22 1841              Assessment:  · 66 yo/F admitted 4/13 for back pain. Found to have L1-L2 osteo (recent epidural nerve block 1 month PTA). · ATBs held until after Bone Bx (done today, 4/18) which was delayed from 4/14/2022 pending Cards clearance. · S/P HD session this AM (4/18) and Bone Bx. · To dose vancomycin, pharmacy will be utilizing dosing based off of levels due to patient requiring hemodialysis. · 4/19: Vancomycin level at 05:10 = 15.1 mcg/mL, no HD ordered for today on MWF schedule. Plan:  · No further vancomycin until next hemodialysis session  · Will check vancomycin levels when appropriate. · Will continue to monitor dialysis orders. · Clinical pharmacy to follow.     Thomas BailonD   Pharmacy Resident   Phone: [phone]  4/19/2022 10:29 AM

## 2022-04-19 NOTE — ANESTHESIA PRE PROCEDURE
Department of Anesthesiology  Preprocedure Note       Name:  Leota Blizzard   Age:  78 y.o.  :  1955                                          MRN:  [id number]         Date:  2022      Surgeon: * Surgery not found *    Procedure: SEY KIARA      Medications prior to admission:   Prior to Admission medications    Medication Sig Start Date End Date Taking? Authorizing Provider   midodrine (PROAMATINE) 5 MG tablet Take 5 mg by mouth daily as needed    Historical Provider, MD   gabapentin (NEURONTIN) 100 MG capsule Take 2 capsules by mouth 3 times daily for 180 days.  3/24/22 9/20/22  Gerda Brown MD   atorvastatin (LIPITOR) 80 MG tablet Take 80 mg by mouth nightly    Historical Provider, MD   bumetanide (BUMEX) 2 MG tablet Take 2 mg by mouth three times a week Given ,Tuesday,Thursday    Historical Provider, MD   isosorbide mononitrate (IMDUR) 30 MG extended release tablet Take 30 mg by mouth daily    Historical Provider, MD   insulin glargine (LANTUS) 100 UNIT/ML injection vial Inject 5 Units into the skin nightly     Historical Provider, MD   lidocaine-prilocaine (EMLA) 2.5-2.5 % cream Apply topically as needed for Pain     Historical Provider, MD   hydrALAZINE (APRESOLINE) 10 MG tablet Take 1 tablet by mouth 2 times daily 2/3/22   Gorge Lewis MD   metoprolol succinate (TOPROL XL) 25 MG extended release tablet Take 1 tablet by mouth daily 21   Gorge Lewis MD   lanthanum (FOSRENOL) 1000 MG chewable tablet Take 1,000 mg by mouth 3 times daily (with meals)  21   Historical Provider, MD   allopurinol (ZYLOPRIM) 100 MG tablet Take 1 tablet by mouth daily 21   Gorge Lewis MD   ticagrelor (BRILINTA) 90 MG TABS tablet Take 1 tablet by mouth 2 times daily 21   Gorge Lewis MD   B Complex-C-Folic Acid (BONI CAPS) 1 MG CAPS Take 1 mg by mouth nightly     Historical Provider, MD   omeprazole (PRILOSEC) 20 MG delayed release capsule Take 20 mg by mouth daily as needed Historical ProviderMD MCHUGH 0.01 % SOLN ophthalmic drops Place 1 drop into the right eye nightly  6/9/20   Meg Provider, MD   COMBIGAN 0.2-0.5 % ophthalmic solution Place 1 drop into the right eye every 12 hours  8/1/19   Historical Provider, MD       Current medications:    No current facility-administered medications for this visit. No current outpatient medications on file.      Facility-Administered Medications Ordered in Other Visits   Medication Dose Route Frequency Provider Last Rate Last Admin    sodium chloride flush 0.9 % injection 10 mL  10 mL IntraVENous PRN Espinoza Ewing MD        cefepime (MAXIPIME) 1000 mg IVPB minibag  1,000 mg IntraVENous Q24H Hector Mir MD   Paused at 04/18/22 1726    vancomycin (VANCOCIN) intermittent dosing (placeholder)   Other RX Placeholder Hector Mir MD        fentaNYL (SUBLIMAZE) injection 25 mcg  25 mcg IntraVENous Q1H PRN Espinoza Ewing MD   25 mcg at 04/16/22 1155    atorvastatin (LIPITOR) tablet 80 mg  80 mg Oral Nightly Kiara Barraza DO   80 mg at 04/18/22 2103    allopurinol (ZYLOPRIM) tablet 100 mg  100 mg Oral Daily Espinoza Ewing MD   100 mg at 04/17/22 0931    b complex-C-folic acid (NEPHROCAPS) capsule 1 mg  1 mg Oral Nightly Espinoza Ewing MD   1 mg at 04/18/22 2103    bumetanide (BUMEX) tablet 2 mg  2 mg Oral Once per day on Sun Tue Thu Espinoza Ewing MD   2 mg at 04/17/22 8122    gabapentin (NEURONTIN) capsule 200 mg  200 mg Oral TID Espinoza Ewing MD   200 mg at 04/18/22 2103    hydrALAZINE (APRESOLINE) tablet 10 mg  10 mg Oral BID Espinoza Ewing MD   10 mg at 04/17/22 0931    insulin glargine-yfgn (SEMGLEE-YFGN) injection vial 5 Units  5 Units SubCUTAneous Nightly Espinoza Ewing MD   5 Units at 04/18/22 2102    isosorbide mononitrate (IMDUR) extended release tablet 30 mg  30 mg Oral Daily Espinoza Ewing MD   30 mg at 04/17/22 0931    lidocaine-prilocaine (EMLA) cream   Topical PRN Patricia Carrion Dominique Cross MD        latanoprost (XALATAN) 0.005 % ophthalmic solution 1 drop  1 drop Right Eye Nightly Sarahy Sullivan MD   1 drop at 04/18/22 2118    midodrine (PROAMATINE) tablet 5 mg  5 mg Oral Daily PRN Sarahy Sullivan MD   5 mg at 04/18/22 1625    pantoprazole (PROTONIX) tablet 40 mg  40 mg Oral QAM AC Sarahy Sullivan MD   40 mg at 04/18/22 0506    metoprolol succinate (TOPROL XL) extended release tablet 25 mg  25 mg Oral Daily Sarahy Sullivan MD   25 mg at 04/17/22 0931    [Held by provider] ticagrelor (BRILINTA) tablet 90 mg  90 mg Oral BID Sarahy Sullivan MD        acetaminophen (TYLENOL) tablet 650 mg  650 mg Oral Q4H PRN Sarahy Sullivan MD        melatonin tablet 3 mg  3 mg Oral Nightly PRN Sarahy Sullivan MD        polyethylene glycol (GLYCOLAX) packet 17 g  17 g Oral Daily PRN Sarahy Sullivan MD   17 g at 04/18/22 1844    glucose (GLUTOSE) 40 % oral gel 15 g  15 g Oral PRN Sarahy Sullivan MD        dextrose 50 % IV solution  12.5 g IntraVENous PRN Sarahy Sullivan MD   12.5 g at 04/14/22 0853    glucagon (rDNA) injection 1 mg  1 mg IntraMUSCular PRN Sarahy Sullivan MD        dextrose 5 % solution  100 mL/hr IntraVENous PRN Sarahy Sullivan MD        [facility]) chewable tablet 1,000 mg  1,000 mg Oral TID  Bob Jorge MD   1,000 mg at 04/18/22 1837    ondansetron (ZOFRAN) injection 4 mg  4 mg IntraVENous Q6H PRN Sarahy Sullivan MD        brimonidine (ALPHAGAN) 0.2 % ophthalmic solution 1 drop  1 drop Right Eye BID Sarahy Sullivan MD   1 drop at 04/18/22 2103    And    timolol (TIMOPTIC) 0.5 % ophthalmic solution 1 drop  1 drop Right Eye BID Sarahy Sullivan MD   1 drop at 04/18/22 2103    oxyCODONE-acetaminophen (PERCOCET) 5-325 MG per tablet 1 tablet  1 tablet Oral Q4H PRN Sarahy Sullivan MD   1 tablet at 04/18/22 2343       Allergies: Allergies   Allergen Reactions    Furosemide Swelling and Other (See Comments)     Patient states \"I swelled up like a pig. \" disease I05.9    CAD in native artery I25.10    Lumbar stenosis without neurogenic claudication M48.061    Lumbar foraminal stenosis M48.061    Back pain M54.9    Intractable low back pain M54.59    Unable to ambulate R26.2       Past Medical History:        Diagnosis Date    Acute infection of bone ([address]     infection of rt foot, resolved.     Acute osteomyelitis of phalanx of left hand ([address] 1/27/2022    Left third distal phalanx    Anemia of chronic disease     Arthritis     Breast cancer ([address]     right breast, 2008/ bladder, 2006- last chemotherapy \"years ago\"    CAD (coronary artery disease)     Carpal tunnel syndrome     bilat - for OR left hand 3-17-20     Chronic diastolic CHF (congestive heart failure) ([address] 09/23/2014 9/23/14- echocardiogram revealed moderate LV concentric hypertrophy, stage III diastolic dysfunction, mild tricuspid regurgitation    CKD (chronic kidney disease) stage 4, GFR 15-29 ml/min ([facility])     Diabetic retinopathy ([address]     Glaucoma     Hemodialysis patient ([address]     [facility]- Dr. Roel Cullen - left arm fistula     Hyperkalemia, diminished renal excretion 11/9/2017    Hyperlipidemia     Hypertension     Hypoglycemia unawareness in type 1 diabetes mellitus ([address] 11/7/2017    Insulin dependent type 2 diabetes mellitus ([address]     Neuropathy     feet    Osteomyelitis due to secondary diabetes ([address]     rt great toe with amputation    Patient is [employer] 11/7/2017    Refusal of blood product     patient states she dose not take blood transfusion    Ventricular hypertrophy     Vitreous hemorrhage ([address]     left eye       Past Surgical History:        Procedure Laterality Date    AMPUTATION      right great toe    ANKLE SURGERY      correction on charcot joint of right ankle    BONE BIOPSY N/A 4/18/2022    BONE BIOPSY PERCUTANEOUS L1/L2 performed by Ce Salinas MD at [address] [place] [address]  12/09/2021    Dr Jayce Asencio CARPAL TUNNEL RELEASE Left 3/17/2020    LEFT CARPAL TUNNEL RELEASE performed by Shiv Dick MD at [address] [facility]      bilateral    CHOLECYSTECTOMY      COLONOSCOPY      CYSTOSCOPY      DIALYSIS FISTULA CREATION Left 01/31/2018    upper arm/Dr. Craig Right    ECHO COMPL W DOP COLOR FLOW  2/14/2013         ECHO COMPLETE  9/17/2013         FINGER AMPUTATION Left 1/20/2022    AMPUTATION OF LEFT THIRD DIGIT, DISTAL PHALANX performed by Sam Velez MD at [address]      right    OTHER SURGICAL HISTORY  9/27/2011    PPV, membranectomy, laser Right eye    OTHER SURGICAL HISTORY  insertion lumbar drain insertion    10/12/`14    OTHER SURGICAL HISTORY  10/22/15    percutaneous lead placement for spinal cord stimulator    OTHER SURGICAL HISTORY  11/03/2015    Spinal; cord stimulator- turned off as of 3-10-20     AK AV ANAST,UP ARM BASILIC VEIN TRANSPOSIT Left 5/15/2018    TRANSPOSITION STAGE II AV FISTULA - LEFT UPPER ARM performed by Earnestine Jones MD at [address] [facility] ANGIOACCESS AV FISTULA Left 9/25/2018    SUPERFICIALIZATION AV FISTULA - LEFT ARM performed by Earnestine Jones MD at [address] [facility] W/VITRECTOMY ANY METH Left 4/10/2018    PARS PLANA VITRECTOMY 25 GAUGE RETINAL DETACHMENT REPAIR air fluid exchange, endolaser performed by Fahad Burnett MD at [address] TRANSESOPHAGEAL ECHOCARDIOGRAM  11/11/2021    Dr. Michael Ewing TUNNELED [facility]  11/15/2017    VITRECTOMY Left 04/10/2018    PARS PLANA VITRECTOMY; RETINAL DETACHMENT REPAIR; GAS BUBBLE; LASER LEFT EYE       Social History:    Social History     Tobacco Use    Smoking status: Never Smoker    Smokeless tobacco: Never Used   Substance Use Topics    Alcohol use: No                                Counseling given: Not Answered      Vital Signs (Current): There were no vitals filed for this visit. BP Readings from Last 3 Encounters:   04/19/22 (!) 109/39   04/18/22 (!) 111/43   03/24/22 (!) 103/32       NPO Status:    > 8 Hours                                                                           BMI:   Wt Readings from Last 3 Encounters:   04/18/22 175 lb 4.3 oz (79.5 kg)   03/24/22 172 lb (78 kg)   03/16/22 179 lb 10.8 oz (81.5 kg)     There is no height or weight on file to calculate BMI.    CBC:   Lab Results   Component Value Date    WBC 5.9 04/19/2022    RBC 3.14 04/19/2022    HGB 8.6 04/19/2022    HCT 28.6 04/19/2022    MCV 91.1 04/19/2022    RDW 17.3 04/19/2022     04/19/2022       CMP:   Lab Results   Component Value Date     04/18/2022    K 4.8 04/18/2022    K 4.6 04/13/2022     04/18/2022    CO2 24 04/18/2022    BUN 41 04/18/2022    CREATININE 6.1 04/18/2022    GFRAA 8 04/18/2022    LABGLOM 8 04/18/2022    GLUCOSE 120 04/18/2022    GLUCOSE 46 04/02/2012    PROT 5.9 04/18/2022    CALCIUM 9.0 04/18/2022    BILITOT 0.3 04/18/2022    ALKPHOS 112 04/18/2022    AST 24 04/18/2022    ALT 27 04/18/2022       POC Tests: No results for input(s): POCGLU, POCNA, POCK, POCCL, POCBUN, POCHEMO, POCHCT in the last 72 hours. Coags:   Lab Results   Component Value Date    PROTIME 11.1 01/20/2022    PROTIME 10.4 02/15/2012    INR 1.0 01/20/2022    APTT 38.0 01/20/2022       HCG (If Applicable): No results found for: PREGTESTUR, PREGSERUM, HCG, HCGQUANT     ABGs: No results found for: PHART, PO2ART, RJX2CLH, KTX9KGR, BEART, U5ASKXWO     Type & Screen (If Applicable):  Lab Results   Component Value Date    LABABO O 08/30/2011    [address] POS 08/30/2011       Drug/Infectious Status (If Applicable):  No results found for: HIV, HEPCAB    COVID-19 Screening (If Applicable):   Lab Results   Component Value Date    COVID19 Not Detected 04/01/2022       CTA 12/07/2021  Impression   1. Marked coronary artery calcification with cardiomegaly.    2. A 1.5 cm ground-glass nodular density in the right middle lobe is new   since 2012 and could be inflammatory or low-grade neoplastic in etiology. Tiny solid-appearing nodules elsewhere measure up to 3 mm.  Please see the   recommendation section below. 3. Thyroid nodules measure up to 3 cm.  Please see the recommendation section   below. 4. There is a remote, unfused transverse fracture of the upper sternal body. 5. There are superficial varices in the soft tissues of the left ventral   chest.   6. Status post right mastectomy.  A suspected chronic seroma in the surgical   bed appears mildly increased since 2012. CARDIAC STRESS TEST 5/20/21     EF 25%  FINDINGS: The overall quality of the study was adequate.       Left ventricular cavity size was noted to be dilated during stress and   rest images       Rotational analog analysis demonstrated show no significant motion   artifact       The gated SPECT stress imaging in the short, vertical long, and   horizontal long axis demonstrated severe defect was present in the   apical wall(s) that was large sized by quantification.              There also was a severe defect present in the septal wall(s) that was   large sized by quantification.       There is also mild defect present in the small inferior apical that   are small in size       There is also a mild defect present in the anterior septal wall that   was small in size       The resting images partially reduced. The anterolateral wall only.       Gated SPECT left ventricular ejection fraction was calculated to be   25%, with apical and septal akinesis, moderate global hypokinesis.            Impression       The myocardial perfusion imaging was abnormal       The abnormality was a large fixed defect in the apical and septal   walls; small fixed defect in the inferoapical wall; partially   reversible defect in the small area of anterolateral wall.       Overall left ventricular systolic function was severely reduced, EF   25% with apical septal akinesis, moderate global hypokinesis. Dilated   left ventricle during stress and rest.       High risk myocardial perfusion study       Compared to previous study from August 2011 which showed large lateral   wall ischemia with EF 66%.           CARDIAC CATH 5/21/21  CONCLUSION:  1. Coronary artery disease:  a. Left main:  20% eccentric mid vessel narrowing.  b.  LAD:  50% proximal vessel narrowing and 95% mid vessel stenosis. Trivial diagonal branch with 90% stenosis. c.  LCX:  Occluded after a small caliber first marginal branch which is  a chronic total occlusion. The second larger marginal branch filled  late. d.  RCA:  Large, dominant vessel with 90% heavily calcified mid vessel  stenosis. 50% disease at the level of the crux and 70% ostial stenosis  of the posterior descending artery branch. 2.  Markedly elevated left ventricular end-diastolic pressure. 3.  Successful balloon angioplasty with deployment of drug-eluting  coronary stent to the mid LAD with very good results. 4.  Successful balloon angioplasty with deployment of drug-eluting  coronary stent to the mid RCA with poststent deployment dilatation with  a larger high-pressure noncompliant balloon with good results.     EKG 04/13/2022  Narrative & Impression    Sinus bradycardia  Nonspecific T wave abnormality  Abnormal ECG  When compared with ECG of 10-MAR-2022 11:36,  Significant changes have occurred  Confirmed by Ramo Khanna ([id number]) on 4/13/2022 12:43:13 PM       ECHO 11/11/2021   Findings      Left Ventricle   Normal left ventricle size and systolic function. Right Ventricle   Normal right ventricular size and function. Left Atrium   Normal sized left atrium. There is no left atrial appendage thrombus. Agitated saline injected for shunt evaluation. No evidence of patent foramen ovale. Right Atrium   Normal right atrium size.       Mitral Valve   Structurally normal anterior mitral leaflet with echodensity attached to   the ventricular side of the posterior leaflet(1.0x 1.2cm) with restriction   excursion. Mild centrally directed mitral regurgitation. Tricuspid Valve   The tricuspid valve appears structurally normal.   Mild tricuspid regurgitation. Aortic Valve   Structurally normal aortic valve. Pulmonic Valve   The pulmonic valve was not well visualized. Pulmonic valve is structurally normal.      Pericardial Effusion   No pericardial effusion. Aorta   Aortic root dimension within normal limits. No significant plaque noted in the descending aorta. Conclusions      Summary   Normal left ventricle size and systolic function. Structurally normal anterior mitral leaflet with echodensity attached to   the ventricular side of the posterior leaflet(1.0x 1.2cm) with restriction   excursion. Mild centrally directed mitral regurgitation. Mild tricuspid regurgitation. Signature        Anesthesia Evaluation  Patient summary reviewed and Nursing notes reviewed no history of anesthetic complications:   Airway: Mallampati: III  TM distance: <3 FB   Neck ROM: limited  Mouth opening: > = 3 FB Dental:    (+) partials  Comment: Partials removed. Multiple missing and chipped teeth. Denies loose teeth. Pulmonary:   (+) decreased breath sounds,      (-) not a current smoker          Patient did not smoke on day of surgery.                  Cardiovascular:  Exercise tolerance: poor (<4 METS),   (+) hypertension:, valvular problems/murmurs (papillary fibroelastoma of mitral valve):, CAD:, CABG/stent (cardiac arrest 5/2021 s/p stent x1):, dysrhythmias: ventricular tachycardia, CHF: diastolic and systolic, FRENCH: after ambulating 1 flight of stairs, hyperlipidemia    (-)  angina    ECG reviewed  Rhythm: regular  Rate: normal  Echocardiogram reviewed  Stress test reviewed  Cleared by cardiology     Beta Blocker:  Dose within 24 Hrs      ROS comment: Hx. Cardiac arrest May 2021 with VT      W/u cabg active RCA stenosis     Neuro/Psych:   (+) neuromuscular disease (diabetic retinopathy, vitreous hemorrhage of left eye, glaucoma, herniated lumbar intervertebral disc, DDD, pseudomeningocele, chronic pain, myalgia):,              ROS comment: Lumbar radiculopathy/DDD/herniated disc  Intractable lower back pain, unable to ambulate  Suspected lumbar spine osteomyelitis  GI/Hepatic/Renal:   (+) renal disease (last IHD 11/10/21): ESRD and dialysis,          ROS comment: M/W/F iHD, pt reports that no fluid was removed 4/18  L upper arm fishtula. Endo/Other:    (+) Diabetes (right great toe amputation d/t osteomyelitis)Type II DM, poorly controlled, using insulin, blood dyscrasia (Pancytopenia): anemia:., malignancy/cancer (right breast CA 2008   with bladder carcinoma s/p chemotherapy). Abdominal:             Vascular: negative vascular ROS. Other Findings: Right arm lymphedema            Anesthesia Plan      MAC     ASA 4       Induction: intravenous. Anesthetic plan and risks discussed with patient. Use of blood products discussed with patient whom did not consent to blood products. Special considerations: [employer]. Plan discussed with attending and CRNA.                   Reuben Romberg, RN   4/19/2022

## 2022-04-19 NOTE — PROGRESS NOTES
Infectious Disease  Progress Note  [facility]    Chief Complaint: R/O  lumbar osteomyelitis    Subjective:  Reports low back pain, denies bowel or bladder incontinence. Has constipation. Afebrile     Scheduled Meds:   cefepime  1,000 mg IntraVENous Q24H    vancomycin (VANCOCIN) intermittent dosing (placeholder)   Other RX Placeholder    atorvastatin  80 mg Oral Nightly    allopurinol  100 mg Oral Daily    b complex-C-folic acid  1 mg Oral Nightly    bumetanide  2 mg Oral Once per day on Sun Tue Thu    gabapentin  200 mg Oral TID    hydrALAZINE  10 mg Oral BID    insulin glargine-yfgn  5 Units SubCUTAneous Nightly    isosorbide mononitrate  30 mg Oral Daily    latanoprost  1 drop Right Eye Nightly    pantoprazole  40 mg Oral QAM AC    metoprolol succinate  25 mg Oral Daily    [Held by provider] ticagrelor  90 mg Oral BID    lanthanum  1,000 mg Oral TID WC    brimonidine  1 drop Right Eye BID    And    timolol  1 drop Right Eye BID     Continuous Infusions:   dextrose       PRN Meds:sodium chloride flush, fentanNYL, lidocaine-prilocaine, midodrine, acetaminophen, melatonin, polyethylene glycol, glucose, dextrose, glucagon (rDNA), dextrose, ondansetron, oxyCODONE-acetaminophen    ROS:  As mentioned in subjective, all other systems negative      BP (!) 107/31   Pulse 69   Temp 97.9 °F (36.6 °C) (Oral)   Resp 16   Ht 5' 10\" (1.778 m)   Wt 175 lb 4.3 oz (79.5 kg)   SpO2 95%   BMI 25.15 kg/m²     Physical Exam  Constitutional: The patient is awake, alert, and oriented. Skin: Warm and dry. No jaundice. HEENT: Eyes show round, and reactive pupils. Neck: Supple to movements. No lymphadenopathy. Chest: Clear to auscultation posteriorly  Cardiovascular: S1 and S2 are rhythmic and regular. No murmurs appreciated. Abdomen: Soft nontender  Extremities: No clubbing, no cyanosis. Right UE lymphedema.   Musculoskeletal:  No edema, strength good, AV fistula ok   Tender L spine   Neurological: Alert and oriented x 3,   Lines: peripheral    CBC with Differential:      Lab Results   Component Value Date    WBC 5.9 04/19/2022    RBC 3.14 04/19/2022    HGB 8.6 04/19/2022    HCT 28.6 04/19/2022     04/19/2022    MCV 91.1 04/19/2022    MCH 27.4 04/19/2022    MCHC 30.1 04/19/2022    RDW 17.3 04/19/2022    NRBC 0.0 02/18/2017    SEGSPCT 70 03/12/2014    BANDSPCT 1 02/18/2015    LYMPHOPCT 24.2 04/18/2022    PROMYELOPCT 1.8 02/18/2017    MONOPCT 14.0 04/18/2022    MYELOPCT 0.9 10/24/2017    BASOPCT 0.6 04/18/2022    MONOSABS 0.75 04/18/2022    LYMPHSABS 1.29 04/18/2022    EOSABS 0.18 04/18/2022    BASOSABS 0.03 04/18/2022       CMP:    Lab Results   Component Value Date     04/18/2022    K 4.8 04/18/2022    K 4.6 04/13/2022     04/18/2022    CO2 24 04/18/2022    BUN 41 04/18/2022    CREATININE 6.1 04/18/2022    GFRAA 8 04/18/2022    LABGLOM 8 04/18/2022    GLUCOSE 120 04/18/2022    GLUCOSE 46 04/02/2012    PROT 5.9 04/18/2022    LABALBU 2.8 04/18/2022    LABALBU 3.8 04/02/2012    CALCIUM 9.0 04/18/2022    BILITOT 0.3 04/18/2022    ALKPHOS 112 04/18/2022    AST 24 04/18/2022    ALT 27 04/18/2022       Hepatic Function Panel:    Lab Results   Component Value Date    ALKPHOS 112 04/18/2022    ALT 27 04/18/2022    AST 24 04/18/2022    PROT 5.9 04/18/2022    BILITOT 0.3 04/18/2022    BILIDIR <0.2 05/25/2021    IBILI see below 05/25/2021    LABALBU 2.8 04/18/2022    LABALBU 3.8 04/02/2012         Microbiology :  Blood culture - neg to date   Component 4/18/22 1154   Gram Stain Orderable Gram stain performed from swab, interpret results with   caution. Swab specimens of sterile fluids are inferior to   aspirate specimens for organism recovery.    Rare Polymorphonuclear leukocytes   Epithelial cells not seen   Rare Gram positive coccobacillary organisms          Assessment:  · Lumbar spine osteomyelitis s/p bone biopsy on 4/18:  · ESRD on HD:     Plan:    · continue IV vancomycin (pharmacy to dose)/cefepime 1gr IV q24  · Will follow bone biopsy and cultures. · Will follow with you.      Electronically signed by Freeman Calvert MD on 4/19/2022 at 10:08 AM

## 2022-04-20 LAB
ALBUMIN SERPL-MCNC: 3 G/DL (ref 3.5–5.2)
ALP BLD-CCNC: 104 U/L (ref 35–104)
ALT SERPL-CCNC: 7 U/L (ref 0–32)
ANION GAP SERPL CALCULATED.3IONS-SCNC: 12 MMOL/L (ref 7–16)
AST SERPL-CCNC: 24 U/L (ref 0–31)
BILIRUB SERPL-MCNC: 0.4 MG/DL (ref 0–1.2)
BUN BLDV-MCNC: 32 MG/DL (ref 6–23)
CALCIUM SERPL-MCNC: 9.4 MG/DL (ref 8.6–10.2)
CHLORIDE BLD-SCNC: 99 MMOL/L (ref 98–107)
CO2: 25 MMOL/L (ref 22–29)
CREAT SERPL-MCNC: 5.6 MG/DL (ref 0.5–1)
GFR AFRICAN AMERICAN: 9
GFR NON-AFRICAN AMERICAN: 9 ML/MIN/1.73
GLUCOSE BLD-MCNC: 100 MG/DL (ref 74–99)
HCT VFR BLD CALC: 30.2 % (ref 34–48)
HEMOGLOBIN: 9 G/DL (ref 11.5–15.5)
MCH RBC QN AUTO: 27.5 PG (ref 26–35)
MCHC RBC AUTO-ENTMCNC: 29.8 % (ref 32–34.5)
MCV RBC AUTO: 92.4 FL (ref 80–99.9)
METER GLUCOSE: 159 MG/DL (ref 74–99)
PDW BLD-RTO: 17.4 FL (ref 11.5–15)
PLATELET # BLD: 258 E9/L (ref 130–450)
PMV BLD AUTO: 9.5 FL (ref 7–12)
POTASSIUM SERPL-SCNC: 4.5 MMOL/L (ref 3.5–5)
RBC # BLD: 3.27 E12/L (ref 3.5–5.5)
SODIUM BLD-SCNC: 136 MMOL/L (ref 132–146)
TOTAL PROTEIN: 5.9 G/DL (ref 6.4–8.3)
WBC # BLD: 4 E9/L (ref 4.5–11.5)

## 2022-04-20 PROCEDURE — 2060000000 HC ICU INTERMEDIATE R&B

## 2022-04-20 PROCEDURE — S5553 INSULIN LONG ACTING 5 U: HCPCS | Performed by: NEUROLOGICAL SURGERY

## 2022-04-20 PROCEDURE — 2580000003 HC RX 258: Performed by: STUDENT IN AN ORGANIZED HEALTH CARE EDUCATION/TRAINING PROGRAM

## 2022-04-20 PROCEDURE — 6370000000 HC RX 637 (ALT 250 FOR IP): Performed by: NEUROLOGICAL SURGERY

## 2022-04-20 PROCEDURE — 6360000002 HC RX W HCPCS: Performed by: STUDENT IN AN ORGANIZED HEALTH CARE EDUCATION/TRAINING PROGRAM

## 2022-04-20 PROCEDURE — 6360000002 HC RX W HCPCS

## 2022-04-20 PROCEDURE — 2580000003 HC RX 258

## 2022-04-20 PROCEDURE — 85027 COMPLETE CBC AUTOMATED: CPT

## 2022-04-20 PROCEDURE — 2580000003 HC RX 258: Performed by: NEUROLOGICAL SURGERY

## 2022-04-20 PROCEDURE — 80053 COMPREHEN METABOLIC PANEL: CPT

## 2022-04-20 PROCEDURE — 36592 COLLECT BLOOD FROM PICC: CPT

## 2022-04-20 PROCEDURE — 82962 GLUCOSE BLOOD TEST: CPT

## 2022-04-20 PROCEDURE — 36415 COLL VENOUS BLD VENIPUNCTURE: CPT

## 2022-04-20 PROCEDURE — 90935 HEMODIALYSIS ONE EVALUATION: CPT

## 2022-04-20 PROCEDURE — 6370000000 HC RX 637 (ALT 250 FOR IP): Performed by: INTERNAL MEDICINE

## 2022-04-20 RX ADMIN — GABAPENTIN 200 MG: 100 CAPSULE ORAL at 13:12

## 2022-04-20 RX ADMIN — POLYETHYLENE GLYCOL 3350 17 G: 17 POWDER, FOR SOLUTION ORAL at 05:30

## 2022-04-20 RX ADMIN — OXYCODONE AND ACETAMINOPHEN 1 TABLET: 5; 325 TABLET ORAL at 13:11

## 2022-04-20 RX ADMIN — INSULIN GLARGINE-YFGN 5 UNITS: 100 INJECTION, SOLUTION SUBCUTANEOUS at 21:22

## 2022-04-20 RX ADMIN — ALLOPURINOL 100 MG: 100 TABLET ORAL at 13:12

## 2022-04-20 RX ADMIN — BRIMONIDINE TARTRATE 1 DROP: 2 SOLUTION/ DROPS OPHTHALMIC at 21:12

## 2022-04-20 RX ADMIN — VANCOMYCIN HYDROCHLORIDE 750 MG: 10 INJECTION, POWDER, LYOPHILIZED, FOR SOLUTION INTRAVENOUS at 14:42

## 2022-04-20 RX ADMIN — LATANOPROST 1 DROP: 50 SOLUTION OPHTHALMIC at 21:20

## 2022-04-20 RX ADMIN — ATORVASTATIN CALCIUM 80 MG: 40 TABLET, FILM COATED ORAL at 21:12

## 2022-04-20 RX ADMIN — PIPERACILLIN AND TAZOBACTAM 3375 MG: 3; .375 INJECTION, POWDER, LYOPHILIZED, FOR SOLUTION INTRAVENOUS at 17:28

## 2022-04-20 RX ADMIN — METOPROLOL SUCCINATE 25 MG: 25 TABLET, EXTENDED RELEASE ORAL at 13:10

## 2022-04-20 RX ADMIN — OXYCODONE AND ACETAMINOPHEN 1 TABLET: 5; 325 TABLET ORAL at 21:10

## 2022-04-20 RX ADMIN — NEPHROCAP 1 MG: 1 CAP ORAL at 21:12

## 2022-04-20 RX ADMIN — BRIMONIDINE TARTRATE 1 DROP: 2 SOLUTION/ DROPS OPHTHALMIC at 13:17

## 2022-04-20 RX ADMIN — OXYCODONE AND ACETAMINOPHEN 1 TABLET: 5; 325 TABLET ORAL at 05:22

## 2022-04-20 RX ADMIN — HYDRALAZINE HYDROCHLORIDE 10 MG: 10 TABLET, FILM COATED ORAL at 18:27

## 2022-04-20 RX ADMIN — TIMOLOL MALEATE 1 DROP: 5 SOLUTION OPHTHALMIC at 13:17

## 2022-04-20 RX ADMIN — SODIUM CHLORIDE, PRESERVATIVE FREE 10 ML: 5 INJECTION INTRAVENOUS at 14:37

## 2022-04-20 RX ADMIN — MAGNESIUM SULFATE: 1 CRYSTAL ORAL; TOPICAL at 15:07

## 2022-04-20 RX ADMIN — SODIUM CHLORIDE, PRESERVATIVE FREE 10 ML: 5 INJECTION INTRAVENOUS at 21:13

## 2022-04-20 RX ADMIN — TIMOLOL MALEATE 1 DROP: 5 SOLUTION OPHTHALMIC at 21:13

## 2022-04-20 RX ADMIN — ISOSORBIDE MONONITRATE 30 MG: 30 TABLET, EXTENDED RELEASE ORAL at 13:10

## 2022-04-20 RX ADMIN — GABAPENTIN 200 MG: 100 CAPSULE ORAL at 21:12

## 2022-04-20 RX ADMIN — SODIUM CHLORIDE, PRESERVATIVE FREE 10 ML: 5 INJECTION INTRAVENOUS at 17:26

## 2022-04-20 RX ADMIN — MIDODRINE HYDROCHLORIDE 5 MG: 5 TABLET ORAL at 06:47

## 2022-04-20 ASSESSMENT — PAIN SCALES - GENERAL
PAINLEVEL_OUTOF10: 0
PAINLEVEL_OUTOF10: 7
PAINLEVEL_OUTOF10: 6
PAINLEVEL_OUTOF10: 5
PAINLEVEL_OUTOF10: 9
PAINLEVEL_OUTOF10: 7
PAINLEVEL_OUTOF10: 7

## 2022-04-20 ASSESSMENT — PAIN DESCRIPTION - FREQUENCY
FREQUENCY: CONTINUOUS

## 2022-04-20 ASSESSMENT — PAIN - FUNCTIONAL ASSESSMENT
PAIN_FUNCTIONAL_ASSESSMENT: PREVENTS OR INTERFERES SOME ACTIVE ACTIVITIES AND ADLS

## 2022-04-20 ASSESSMENT — PAIN DESCRIPTION - DESCRIPTORS
DESCRIPTORS: DISCOMFORT;SHARP;SORE
DESCRIPTORS: DISCOMFORT;SHARP;SORE
DESCRIPTORS: ACHING;SQUEEZING;THROBBING
DESCRIPTORS: DISCOMFORT;SHARP;SORE

## 2022-04-20 ASSESSMENT — PAIN DESCRIPTION - ONSET
ONSET: ON-GOING

## 2022-04-20 ASSESSMENT — PAIN DESCRIPTION - PAIN TYPE
TYPE: ACUTE PAIN;CHRONIC PAIN

## 2022-04-20 ASSESSMENT — PAIN DESCRIPTION - LOCATION
LOCATION: BACK
LOCATION: BACK;LEG
LOCATION: BACK;LEG
LOCATION: LEG;BACK

## 2022-04-20 ASSESSMENT — PAIN DESCRIPTION - ORIENTATION
ORIENTATION: RIGHT;LOWER

## 2022-04-20 ASSESSMENT — PAIN DESCRIPTION - PROGRESSION
CLINICAL_PROGRESSION: NOT CHANGED
CLINICAL_PROGRESSION: NOT CHANGED

## 2022-04-20 NOTE — PROGRESS NOTES
Comprehensive Nutrition Assessment    Type and Reason for Visit:  Initial,RD Nutrition Re-Screen/LOS    Nutrition Recommendations/Plan:   1. Continue current diet order. Recommend and start Nepro ONS once daily and Magic cup once daily to optimize intake. Malnutrition Assessment:  Malnutrition Status:  No malnutrition (04/20/22 1752)    Context:  Acute Illness     Findings of the 6 clinical characteristics of malnutrition:  Energy Intake:  75% or less of estimated energy requirements for 7 or more days  Weight Loss:  No significant weight loss     Body Fat Loss:  No significant body fat loss     Muscle Mass Loss:  No significant muscle mass loss    Fluid Accumulation:  No significant fluid accumulation     Strength:  Not Performed    Nutrition Assessment:    Pt. presented to for back pain 2/2 Lumbar spine OM. Pt. is s/p bone biopsy on 4/18. Noted hx DM, CHF, Breast CA, & ESRD on HD. Noted constipation. Pt. is w/ varied intakes since admit. Will start ONS and monitor. Nutrition Related Findings:    A&OX4, -I/O, Elevated phos (per 4/18 lab) , Constipation no BM x 1 week , +BS, left AV fistula / +1 RUE edema Wound Type: Surgical Incision       Current Nutrition Intake & Therapies:    Average Meal Intake: 0% (Varied intakes since admit, most recent intakes 0%)  Average Supplements Intake: None Ordered  ADULT DIET; Regular; Low Sodium (2 gm); Low Potassium (Less than 3000 mg/day); Low Phosphorus (Less than 1000 mg)    Anthropometric Measures:  Height: 5' 10\" (177.8 cm)  Ideal Body Weight (IBW): 150 lbs (68 kg)    Admission Body Weight: 167 lb 8.8 oz (76 kg) (4/15 BS)  Current Body Weight: 176 lb 5.9 oz (80 kg) (4/20 BS), 117.6 % IBW. 150 lb  Current BMI (kg/m2): 25.3  Usual Body Weight: 193 lb 6.4 oz (87.7 kg) (6/3/21 Wt hx stable)  % Weight Change (Calculated): -8.8   BMI Categories: Overweight (BMI 25.0-29. 9)    Estimated Daily Nutrient Needs:  Energy Requirements Based On: Formula  Weight Used for Energy Requirements: Current  Energy (kcal/day): MSMEME 1421 x 1.2 SF = 1700-1800kcal  Weight Used for Protein Requirements: Ideal  Protein (g/day): 82-95 (1,3-1.5g/kg IBW as tolerated w/ ESRD)  Method Used for Fluid Requirements: Standard Renal  Fluid (ml/day): per renal management    Nutrition Diagnosis:   · Inadequate oral intake related to pain (Lumbar OM) as evidenced by poor intake prior to admission,intake 0-25%      Nutrition Interventions:   Food and/or Nutrient Delivery: Continue Current Diet,Start Oral Nutrition Supplement (Nepro Once daily, Magic cup once daily)  Nutrition Education/Counseling: No recommendation at this time  Coordination of Nutrition Care: Continue to monitor while inpatient       Goals:     Goals: PO intake 50% or greater,by next RD assessment       Nutrition Monitoring and Evaluation:   Behavioral-Environmental Outcomes: None Identified  Food/Nutrient Intake Outcomes: Food and Nutrient Intake,Supplement Intake  Physical Signs/Symptoms Outcomes: Biochemical Data,GI Status,Constipation,Nutrition Focused Physical Findings,Skin,Weight,Fluid Status or Edema    Discharge Planning:    No discharge needs at this time     Haley Case RD  Contact: ext [phone]

## 2022-04-20 NOTE — PROGRESS NOTES
Progress Note  4/20/2022 11:20 AM  Subjective:   Admit Date: 4/13/2022  PCP: Buck Michelle MD  Interval History: Seen on dialysis doing ok back hurts     Diet: ADULT DIET; Regular; Low Sodium (2 gm); Low Potassium (Less than 3000 mg/day); Low Phosphorus (Less than 1000 mg)    Data:   Scheduled Meds:   vancomycin  750 mg IntraVENous Once    magnesium - glycerin - water   Rectal Once    cefepime  1,000 mg IntraVENous Q24H    vancomycin (VANCOCIN) intermittent dosing (placeholder)   Other RX Placeholder    atorvastatin  80 mg Oral Nightly    allopurinol  100 mg Oral Daily    b complex-C-folic acid  1 mg Oral Nightly    bumetanide  2 mg Oral Once per day on Sun Tue Thu    gabapentin  200 mg Oral TID    hydrALAZINE  10 mg Oral BID    insulin glargine-yfgn  5 Units SubCUTAneous Nightly    isosorbide mononitrate  30 mg Oral Daily    latanoprost  1 drop Right Eye Nightly    pantoprazole  40 mg Oral QAM AC    metoprolol succinate  25 mg Oral Daily    [Held by provider] ticagrelor  90 mg Oral BID    lanthanum  1,000 mg Oral TID WC    brimonidine  1 drop Right Eye BID    And    timolol  1 drop Right Eye BID     Continuous Infusions:   dextrose       PRN Meds:sodium chloride flush, fentanNYL, lidocaine-prilocaine, midodrine, acetaminophen, melatonin, polyethylene glycol, glucose, dextrose, glucagon (rDNA), dextrose, ondansetron, oxyCODONE-acetaminophen  I/O last 3 completed shifts:   In: 501.7 [I.V.:150; IV Piggyback:351.7]  Out: -   I/O this shift:  In: 300   Out: 900     Intake/Output Summary (Last 24 hours) at 4/20/2022 1120  Last data filed at 4/20/2022 1041  Gross per 24 hour   Intake 801.65 ml   Output 900 ml   Net -98.35 ml     CBC:   Recent Labs     04/18/22  0451 04/19/22  0513 04/20/22  0629   WBC 5.3 5.9 4.0*   HGB 8.9* 8.6* 9.0*    297 258     BMP:    Recent Labs     04/18/22  0451 04/20/22  0629    136   K 4.8 4.5    99   CO2 24 25   BUN 41* 32*   CREATININE 6.1* 5.6* GLUCOSE 120* 100*     Hepatic:   Recent Labs     04/18/22  0451 04/20/22  0629   AST 24 24   ALT 27 7   BILITOT 0.3 0.4   ALKPHOS 112* 104     Troponin: No results for input(s): TROPONINI in the last 72 hours. BNP: No results for input(s): BNP in the last 72 hours. Lipids: No results for input(s): CHOL, HDL in the last 72 hours. Invalid input(s): LDLCALCU  ABGs: No results found for: PHART, PO2ART, BGD7HRG  INR: No results for input(s): INR in the last 72 hours. -----------------------------------------------------------------  RAD: CT LUMBAR SPINE WO CONTRAST    Result Date: 4/13/2022  EXAMINATION: CT OF THE LUMBAR SPINE WITHOUT CONTRAST  4/13/2022 TECHNIQUE: CT of the lumbar spine was performed without the administration of intravenous contrast. Multiplanar reformatted images are provided for review. Adjustment of mA and/or kV according to patient size was utilized. Dose modulation, iterative reconstruction, and/or weight based adjustment of the mA/kV was utilized to reduce the radiation dose to as low as reasonably achievable. COMPARISON: CT lumbar spine March 8, 2022 HISTORY: ORDERING SYSTEM PROVIDED HISTORY: pain, no trauma TECHNOLOGIST PROVIDED HISTORY: Reason for exam:->pain, no trauma Decision Support Exception - unselect if not a suspected or confirmed emergency medical condition->Emergency Medical Condition (MA) What reading provider will be dictating this exam?->CRC FINDINGS: BONES/ALIGNMENT: Demonstrated is posterior fixation hardware seen from L3-L5 with decompressive laminectomy. No evidence of hardware complications. L1-L2: There is soft tissue edema adjacent to the L1 disc. There is new loss of height at the L1 inferior endplate extending into the vertebral body when compared to prior exam.  There is pressure free hypertrophy of the ligamentum flavum and protrusion of the intervertebral disc resulting in moderate spinal canal stenosis.   Again demonstrated is moderate to severe neural foraminal stenosis. L2-L3: Again demonstrated is severe loss of height at L2 with anterolisthesis L2 on L3 measuring approximately 1.4 cm. Severe spinal canal stenosis again demonstrated at L2-L3. Severe bilateral neural foraminal stenosis. L3-L4: Status post laminectomy and posterior fixation. Limited evaluation due to hardware streak artifacts. Again demonstrated is severe loss of disc height. Mild neural foraminal stenosis. L4-L5: Status post laminectomy and posterior fixation. Limited evaluation due to hardware streak artifacts. Again demonstrated is severe loss of disc height. Mild neural foraminal stenosis. L5-S1: Mild to moderate moderate to severe loss of disc height with vacuum phenomenon. There is mild spinal canal stenosis. Facet hypertrophy identified. Moderate right and severe left neural foraminal stenosis. SOFT TISSUES/RETROPERITONEUM: No paraspinal mass is seen. New loss of height of L1 inferior endplate with increased soft tissue inflammation. Finding is suspicious for possible osteomyelitis. Further evaluation with lumbar spine MRI may be considered. Objective:   Vitals: BP (!) 146/31   Pulse 56   Temp 97.4 °F (36.3 °C)   Resp 16   Ht 5' 10\" (1.778 m)   Wt 176 lb 5.9 oz (80 kg)   SpO2 96%   BMI 25.31 kg/m²   General appearance: appears stated age   Skin:  No rashes or lesions  HEENT: Head: Normocephalic, no lesions, without obvious abnormality.   Neck: no adenopathy, no carotid bruit, no JVD, supple, symmetrical, trachea midline and thyroid not enlarged, symmetric, no tenderness/mass/nodules  Lungs: clear to auscultation bilaterally  Heart: regular rate and rhythm, S1, S2 normal, no murmur, click, rub or gallop  Abdomen: soft, non-tender; bowel sounds normal; no masses,  no organomegaly  Extremities: extremities normal, atraumatic, no cyanosis or edema  Neurologic: Mental status: Alert, oriented, thought content appropriate    Assessment:   Patient Active Problem List: Diabetic retinopathy ([address]     Malignant neoplasm of right female breast ([address]     Atherosclerosis of native coronary artery of native heart without angina pectoris     Moderate obesity     Left ventricular hypertrophy     Herniated lumbar intervertebral disc     Lumbar degenerative disc disease     Pseudomeningocele     Lumbar radiculopathy     Lymphedema of arm     CKD (chronic kidney disease) stage 4, GFR 15-29 ml/min ([facility])     Insulin dependent type 2 diabetes mellitus (HCC)     Anemia of chronic disease     Chronic diastolic CHF (congestive heart failure) (HCC)     Neuropathy     Hypertension     Glaucoma     Refusal of blood product     Pancytopenia ([address]     Controlled type 2 diabetes mellitus with chronic kidney disease on chronic dialysis, with long-term current use of insulin ([address]     Vitreous hemorrhage ([address]     Patient is Mandaen     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 ([facility])     Hyperkalemia, diminished renal excretion     Chronic pain syndrome     Lumbar post-laminectomy syndrome     Myalgia     Cervicalgia     Diabetic peripheral neuropathy (HCC)     ESRD (end stage renal disease) (HCC)     Bilateral carpal tunnel syndrome     Spinal stenosis of lumbar region with neurogenic claudication     Cardiac arrest ([address]     ESRD (end stage renal disease) on dialysis ([address]     Mixed hyperlipidemia     Lymphedema of right upper extremity     Coronary artery disease involving native coronary artery of native heart with angina pectoris (HCC)     Ventricular tachycardia (HCC)     Mitral valve disease     CAD in native artery     Lumbar stenosis without neurogenic claudication     Lumbar foraminal stenosis     Back pain     Intractable low back pain     Unable to ambulate    Plan:       1)End-stage kidney disease: Continue hemodialysis Monday Wednesday and Friday via a patent and well-developed left upper arm fistula, dialysis as planned      2) Controlled hypertension with chronic kidney disease     3) Anemia with chronic kidney disease: Transfuse as needed     4) Back pain: Neurosurgery and ID following, s/p Bone biopsy  .  Await results on AB per ID      Christopher Ugarte MD

## 2022-04-20 NOTE — PROGRESS NOTES
Pharmacy Consultation Note  (Antibiotic Dosing and Monitoring)    Initial consult date: 4-  Consulting physician/provider: Dr. Yamilet Shelby  Drug: Vancomycin  Indication: Osteomyelitis (L1-L2)    Age/Gender Height Weight IBW  Allergy Information   67 y.o./female 5' 10\" (177.8 cm) 172 lb (78 kg)     Ideal body weight: 68.5 kg (151 lb 0.2 oz)  Adjusted ideal body weight: 73.3 kg (161 lb 11 oz)   Furosemide      Renal Function: ESRD (HD: MWF)     Vancomycin Monitoring:  Trough:  No results for input(s): VANCOTROUGH in the last 72 hours. Random:    Recent Labs     04/19/22  0510   VANCORANDOM 15.1       Vancomycin Administration Times:  Recent vancomycin administrations                   vancomycin 1500 mg in dextrose 5% 300 mL IVPB (mg) 1,500 mg New Bag 04/18/22 1841              Assessment:  · 66 yo/F admitted 4/13 for back pain. Found to have L1-L2 osteo (recent epidural nerve block 1 month PTA). · ATBs held until after Bone Bx (done today, 4/18) which was delayed from 4/14/2022 pending Cards clearance. · S/P HD session this AM (4/18) and Bone Bx. · To dose vancomycin, pharmacy will be utilizing dosing based off of levels due to patient requiring hemodialysis. · 4/19: Vancomycin level at 05:10 = 15.1 mcg/mL, no HD ordered for today on MWF schedule. · 4/20: Patient in hemodialysis today, scheduled for 4 hours. WBC 4, patient afebrile in last 24 hours. Plan:  · Vancomycin 750 mg IV x 1 following hemodialysis today  · Will check vancomycin levels when appropriate. · Will continue to monitor dialysis orders. · Clinical pharmacy to follow.     Thomas MilliganD   Pharmacy Resident   Phone: [phone]  4/20/2022 8:28 AM

## 2022-04-20 NOTE — PROGRESS NOTES
Infectious Disease  Progress Note  [facility]    Chief Complaint: R/O  lumbar osteomyelitis    Subjective:  Reports low back pain, denies bowel or bladder incontinence. Has constipation. Afebrile     Scheduled Meds:   vancomycin  750 mg IntraVENous Once    piperacillin-tazobactam  3,375 mg IntraVENous Q12H    vancomycin (VANCOCIN) intermittent dosing (placeholder)   Other RX Placeholder    atorvastatin  80 mg Oral Nightly    allopurinol  100 mg Oral Daily    b complex-C-folic acid  1 mg Oral Nightly    bumetanide  2 mg Oral Once per day on Sun Tue Thu    gabapentin  200 mg Oral TID    hydrALAZINE  10 mg Oral BID    insulin glargine-yfgn  5 Units SubCUTAneous Nightly    isosorbide mononitrate  30 mg Oral Daily    latanoprost  1 drop Right Eye Nightly    pantoprazole  40 mg Oral QAM AC    metoprolol succinate  25 mg Oral Daily    [Held by provider] ticagrelor  90 mg Oral BID    lanthanum  1,000 mg Oral TID WC    brimonidine  1 drop Right Eye BID    And    timolol  1 drop Right Eye BID     Continuous Infusions:   dextrose       PRN Meds:sodium chloride flush, fentanNYL, lidocaine-prilocaine, midodrine, acetaminophen, melatonin, polyethylene glycol, glucose, dextrose, glucagon (rDNA), dextrose, ondansetron, oxyCODONE-acetaminophen    ROS:  As mentioned in subjective, all other systems negative      BP (!) 146/31   Pulse 56   Temp 97.4 °F (36.3 °C)   Resp 16   Ht 5' 10\" (1.778 m)   Wt 176 lb 5.9 oz (80 kg)   SpO2 96%   BMI 25.31 kg/m²     Physical Exam  Constitutional: The patient is awake, alert, and oriented. Skin: Warm and dry. No jaundice. HEENT: Eyes show round, and reactive pupils. Neck: Supple to movements. No lymphadenopathy. Chest: Clear to auscultation posteriorly  Cardiovascular: S1 and S2 are rhythmic and regular. No murmurs appreciated. Abdomen: Soft nontender  Extremities: No clubbing, no cyanosis. Right UE lymphedema.   Musculoskeletal:  No edema, strength good, AV fistula ok Tender L spine   Neurological: Alert and oriented x 3,   Lines: right upper chest port accessed    CBC with Differential:      Lab Results   Component Value Date    WBC 4.0 04/20/2022    RBC 3.27 04/20/2022    HGB 9.0 04/20/2022    HCT 30.2 04/20/2022     04/20/2022    MCV 92.4 04/20/2022    MCH 27.5 04/20/2022    MCHC 29.8 04/20/2022    RDW 17.4 04/20/2022    NRBC 0.0 02/18/2017    SEGSPCT 70 03/12/2014    BANDSPCT 1 02/18/2015    LYMPHOPCT 24.2 04/18/2022    PROMYELOPCT 1.8 02/18/2017    MONOPCT 14.0 04/18/2022    MYELOPCT 0.9 10/24/2017    BASOPCT 0.6 04/18/2022    MONOSABS 0.75 04/18/2022    LYMPHSABS 1.29 04/18/2022    EOSABS 0.18 04/18/2022    BASOSABS 0.03 04/18/2022       CMP:    Lab Results   Component Value Date     04/20/2022    K 4.5 04/20/2022    K 4.6 04/13/2022    CL 99 04/20/2022    CO2 25 04/20/2022    BUN 32 04/20/2022    CREATININE 5.6 04/20/2022    GFRAA 9 04/20/2022    LABGLOM 9 04/20/2022    GLUCOSE 100 04/20/2022    GLUCOSE 46 04/02/2012    PROT 5.9 04/20/2022    LABALBU 3.0 04/20/2022    LABALBU 3.8 04/02/2012    CALCIUM 9.4 04/20/2022    BILITOT 0.4 04/20/2022    ALKPHOS 104 04/20/2022    AST 24 04/20/2022    ALT 7 04/20/2022       Hepatic Function Panel:    Lab Results   Component Value Date    ALKPHOS 104 04/20/2022    ALT 7 04/20/2022    AST 24 04/20/2022    PROT 5.9 04/20/2022    BILITOT 0.4 04/20/2022    BILIDIR <0.2 05/25/2021    IBILI see below 05/25/2021    LABALBU 3.0 04/20/2022    LABALBU 3.8 04/02/2012         Microbiology :  Blood culture - neg to date   Component 4/18/22 1154   Gram Stain Orderable Gram stain performed from swab, interpret results with   caution. Swab specimens of sterile fluids are inferior to   aspirate specimens for organism recovery. Rare Polymorphonuclear leukocytes   Epithelial cells not seen   Rare Gram positive coccobacillary organisms      anerobic culture: has growth.  Evaluation in progress  Aerobic cx: no growth    Assessment:  · Lumbar spine osteomyelitis s/p bone biopsy on 4/18:anerobic cx growing organisms. · ESRD on HD:     Plan:    · continue IV vancomycin (pharmacy to dose)  · Switched cefepime to zosyn 3.375mg IV Q12  · Will follow bone biopsy and cultures. · Will follow with you.      Electronically signed by Mark Webb MD on 4/20/2022 at 3:12 PM

## 2022-04-20 NOTE — CARE COORDINATION
Auth for justin good through tomorrow 4/21, bone cultures pending. Will request therapy see patient tomorrow to start new auth. Vanco and cefepime with HD continues. Pasrr and ambulette on soft chart. For questions I can be reached at [phone].  Randall [place]

## 2022-04-20 NOTE — PROGRESS NOTES
Neurosurgically stable as she was yesterday. A little more comfortable. On antibiotics per ID  No growth on culture.   Gram stain: Rare Gram positive coccobacillary organisms

## 2022-04-20 NOTE — FLOWSHEET NOTE
04/20/22 1041   Vital Signs   BP (!) 146/31   Temp 97.4 °F (36.3 °C)   Pulse 56   Weight 176 lb 5.9 oz (80 kg)   Weight Method Bed scale   Percent Weight Change -0.74   Post-Hemodialysis Assessment   Post-Treatment Procedures Blood returned; Access bleeding time < 10 minutes   Machine Disinfection Process Exterior Machine Disinfection   Rinseback Volume (ml) 300 ml   Total Liters Processed (l/min) 90.3 l/min   Dialyzer Clearance Lightly streaked   Duration of Treatment (minutes) 240 minutes   Heparin amount administered during treatment (units) 0 units   Hemodialysis Intake (ml) 300 ml   Hemodialysis Output (ml) 900 ml   NET Removed (ml) 600 ml   Tolerated Treatment Fair   Interventions Taken Ultrafiltration stopped   Patient Response to Treatment hyptensive during tx, blood returned, needles pulled, sites held, stasis achieved, bandaids applied, +thirll, +bruti

## 2022-04-20 NOTE — PROGRESS NOTES
Hospitalist Progress Note      SYNOPSIS: Patient admitted on 2022 for Unable to ambulate has a past medical history that includes spinal stenosis, ESRD on dialysis, chronic anemia, hypertension, hyperlipidemia, diabetes mellitus, gout, coronary artery disease, mitral valve papillary fibroblastoma.     Claudia presented to the ER with back pain and issues with ambulation. She states she was unable to get out of bed to go to dialysis today. The patient has significant heart disease including valvular disease that anesthesiology has stated as a barrier to her doing surgery on her back.  Unfortunately, until she is able to ambulate effectively for post surgical rehab, the patient cannot have corrective surgery on her heart either.  Due to this difficult position, the patient was treated about a month ago with a nerve block of her back which was effective for about a month and she was able to do physical therapy. She was recently discharged from SNF on 4/3. However today pain caused patient to be unable to go to dialysis today. She denies any bowel or bladder incontinence.     CT of the lumbar spine showed new loss of height in L1 inferior endplate with increased soft tissue inflammation. Findings suspicious for possible osteomyelitis.     Patient has spinal cord stimulator so is unable to have MRI    S/p bone biopsy     S/p KIARA       SUBJECTIVE:  Stable overnight. No other overnight issues reported. Records reviewed. S/p KIARA yesterday   LV systolic function mildly reduced.    Ejection fraction is visually estimated at 40-45%.    Moderate mitral annular calcification.    Valvular and subvalvular calcification.    Mild mitral regurgitation.    No definitive evidence of papillary fibroelastoma. Seen in dialysis        Temp (24hrs), Av.9 °F (36.6 °C), Min:96.9 °F (36.1 °C), Max:98.6 °F (37 °C)    DIET: ADULT DIET; Regular; Low Sodium (2 gm); Low Potassium (Less than 3000 mg/day);  Low Phosphorus (Less than 1000 mg)  CODE: Full Code    Intake/Output Summary (Last 24 hours) at 4/20/2022 0813  Last data filed at 4/19/2022 2100  Gross per 24 hour   Intake 501.65 ml   Output --   Net 501.65 ml        Review of Systems  All bolded are positive; please see HPI  General:  Fever, chills, diaphoresis, fatigue, malaise, night sweats, weight loss  Psychological:  Anxiety, disorientation, hallucinations. ENT:  Epistaxis, headaches, vertigo, visual changes. Cardiovascular:  Chest pain, irregular heartbeats, palpitations, paroxysmal nocturnal dyspnea. Respiratory:  Shortness of breath, coughing, sputum production, hemoptysis, wheezing, orthopnea. Gastrointestinal:  Nausea, vomiting, diarrhea, heartburn, constipation, abdominal pain, hematemesis, hematochezia, melena, acholic stools  Genito-Urinary:  Dysuria, urgency, frequency, hematuria  Musculoskeletal:  Joint pain, joint stiffness, joint swelling, muscle pain back pain  Neurology:  Headache, focal neurological deficits, weakness, numbness, paresthesia  Derm:  Rashes, ulcers, excoriations, bruising  Extremities:  Decreased ROM, peripheral edema, mottling      OBJECTIVE:    BP (!) 97/16   Pulse 53   Temp 97.6 °F (36.4 °C)   Resp 16   Ht 5' 10\" (1.778 m)   Wt 177 lb 11.1 oz (80.6 kg)   SpO2 96%   BMI 25.50 kg/m²      General appearance:  awake, alert, and oriented to person, place, time, and purpose; appears stated age and cooperative; no apparent distress no labored breathing  HEENT:  Conjunctivae/corneas clear. Neck: Supple. No jugular venous distention. Respiratory: symmetrical; clear to auscultation bilaterally; no wheezes; no rhonchi; no rales  Cardiovascular: rhythm regular; rate controlled; no murmurs  Abdomen: Soft, nontender, nondistended  Extremities:  peripheral pulses present; no peripheral edema; no ulcers  Musculoskeletal: No clubbing, cyanosis, no bilateral lower extremity edema. Brisk capillary refill.    Skin:  No rashes  on visible skin  Neurologic: awake, alert and following commands     ASSESSMENT and PLAN:  · Back pain with spinal stenosis and possible osteomyelitis of lumbar spine -Neurosurgery consultation. Pain control. ID consulted. Patient was unable to get MRI due to stimulator. S/p bone biopsy 4/18. Started on cefepime/vanc  · CAD- with h/o LAD and RCA stents 5/2021 and with cath 12/21 showing patent LAD stent, high grade focal in stent restenosis of the RCA and known  of OM2. Per cardio, repeat KIARA with Dr. Luh Barker during this admission and then discuss with cardiothoracic surgery. Since she has had no stroke and if indeed there is no mitral valve abnormality of significance then she may just need a RCA PCI. · Mitral valve Papillary fibroelastoma- per cardio,  did not see evidence of the described mitral valve fibroblastoma. There may be a small aortic valve fibroblastoma but the mitral valve abnormality seems to be most likely annular calcification. · End-stage renal disease on hemodialysis- Monday Wednesday Friday, consult nephrology for renal replacement therapy. · Chronic anemia secondary to end-stage renal disease: Monitor H&H  · Essential hypertension: Continue outpatient medications with hydralazine and metoprolol.   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  · Jehovah Witness          Medications:  REVIEWED DAILY    Infusion Medications    dextrose       Scheduled Medications    cefepime  1,000 mg IntraVENous Q24H    vancomycin (VANCOCIN) intermittent dosing (placeholder)   Other RX Placeholder    atorvastatin  80 mg Oral Nightly    allopurinol  100 mg Oral Daily    b complex-C-folic acid  1 mg Oral Nightly    bumetanide  2 mg Oral Once per day on Sun Tue Thu    gabapentin  200 mg Oral TID    hydrALAZINE  10 mg Oral BID    insulin glargine-yfgn  5 Units SubCUTAneous Nightly    isosorbide mononitrate  30 mg Oral Daily    latanoprost  1 drop Right Eye Nightly    pantoprazole  40 mg Oral QAM AC    metoprolol succinate  25 mg Oral Daily    [Held by provider] ticagrelor  90 mg Oral BID    lanthanum  1,000 mg Oral TID WC    brimonidine  1 drop Right Eye BID    And    timolol  1 drop Right Eye BID     PRN Meds: sodium chloride flush, fentanNYL, lidocaine-prilocaine, midodrine, acetaminophen, melatonin, polyethylene glycol, glucose, dextrose, glucagon (rDNA), dextrose, ondansetron, oxyCODONE-acetaminophen    Labs:     Recent Labs     04/18/22 0451 04/19/22  0513 04/20/22  0629   WBC 5.3 5.9 4.0*   HGB 8.9* 8.6* 9.0*   HCT 30.3* 28.6* 30.2*    297 258       Recent Labs     04/18/22  0451 04/20/22  0629    136   K 4.8 4.5    99   CO2 24 25   BUN 41* 32*   CREATININE 6.1* 5.6*   CALCIUM 9.0 9.4   PHOS 4.7*  --        Recent Labs     04/18/22  0451 04/20/22  0629   PROT 5.9* 5.9*   ALKPHOS 112* 104   ALT 27 7   AST 24 24   BILITOT 0.3 0.4       No results for input(s): INR in the last 72 hours. No results for input(s): Isi Pace in the last 72 hours. Chronic labs:    Lab Results   Component Value Date    CHOL 100 04/15/2022    TRIG 107 04/15/2022    HDL 27 04/15/2022    LDLCALC 52 04/15/2022    TSH 1.500 04/15/2022    INR 1.0 01/20/2022    LABA1C 5.5 04/15/2022       Radiology: REVIEWED DAILY    +++++++++++++++++++++++++++++++++++++++++++++++++  DO Nick Sánchez Physician - [address], [place]  +++++++++++++++++++++++++++++++++++++++++++++++++  NOTE: This report was transcribed using voice recognition software. Every effort was made to ensure accuracy; however, inadvertent computerized transcription errors may be present.

## 2022-04-21 LAB
ALBUMIN SERPL-MCNC: 2.8 G/DL (ref 3.5–5.2)
ALP BLD-CCNC: 100 U/L (ref 35–104)
ALT SERPL-CCNC: 5 U/L (ref 0–32)
ANION GAP SERPL CALCULATED.3IONS-SCNC: 10 MMOL/L (ref 7–16)
AST SERPL-CCNC: 21 U/L (ref 0–31)
BILIRUB SERPL-MCNC: 0.3 MG/DL (ref 0–1.2)
BUN BLDV-MCNC: 19 MG/DL (ref 6–23)
CALCIUM SERPL-MCNC: 8.9 MG/DL (ref 8.6–10.2)
CHLORIDE BLD-SCNC: 100 MMOL/L (ref 98–107)
CO2: 25 MMOL/L (ref 22–29)
CREAT SERPL-MCNC: 3.5 MG/DL (ref 0.5–1)
GFR AFRICAN AMERICAN: 16
GFR NON-AFRICAN AMERICAN: 16 ML/MIN/1.73
GLUCOSE BLD-MCNC: 118 MG/DL (ref 74–99)
HCT VFR BLD CALC: 24.8 % (ref 34–48)
HEMOGLOBIN: 7.3 G/DL (ref 11.5–15.5)
MCH RBC QN AUTO: 27.4 PG (ref 26–35)
MCHC RBC AUTO-ENTMCNC: 29.4 % (ref 32–34.5)
MCV RBC AUTO: 93.2 FL (ref 80–99.9)
METER GLUCOSE: 193 MG/DL (ref 74–99)
PDW BLD-RTO: 17.4 FL (ref 11.5–15)
PLATELET # BLD: 226 E9/L (ref 130–450)
PMV BLD AUTO: 10.1 FL (ref 7–12)
POTASSIUM SERPL-SCNC: 4 MMOL/L (ref 3.5–5)
RBC # BLD: 2.66 E12/L (ref 3.5–5.5)
SODIUM BLD-SCNC: 135 MMOL/L (ref 132–146)
TOTAL PROTEIN: 5.6 G/DL (ref 6.4–8.3)
WBC # BLD: 5.1 E9/L (ref 4.5–11.5)

## 2022-04-21 PROCEDURE — 6360000002 HC RX W HCPCS: Performed by: STUDENT IN AN ORGANIZED HEALTH CARE EDUCATION/TRAINING PROGRAM

## 2022-04-21 PROCEDURE — 82962 GLUCOSE BLOOD TEST: CPT

## 2022-04-21 PROCEDURE — 2580000003 HC RX 258: Performed by: STUDENT IN AN ORGANIZED HEALTH CARE EDUCATION/TRAINING PROGRAM

## 2022-04-21 PROCEDURE — 97530 THERAPEUTIC ACTIVITIES: CPT

## 2022-04-21 PROCEDURE — S5553 INSULIN LONG ACTING 5 U: HCPCS | Performed by: NEUROLOGICAL SURGERY

## 2022-04-21 PROCEDURE — 6370000000 HC RX 637 (ALT 250 FOR IP): Performed by: NEUROLOGICAL SURGERY

## 2022-04-21 PROCEDURE — 6370000000 HC RX 637 (ALT 250 FOR IP): Performed by: INTERNAL MEDICINE

## 2022-04-21 PROCEDURE — 2060000000 HC ICU INTERMEDIATE R&B

## 2022-04-21 PROCEDURE — 97535 SELF CARE MNGMENT TRAINING: CPT

## 2022-04-21 PROCEDURE — 80053 COMPREHEN METABOLIC PANEL: CPT

## 2022-04-21 PROCEDURE — 85027 COMPLETE CBC AUTOMATED: CPT

## 2022-04-21 RX ADMIN — TIMOLOL MALEATE 1 DROP: 5 SOLUTION OPHTHALMIC at 22:31

## 2022-04-21 RX ADMIN — METOPROLOL SUCCINATE 25 MG: 25 TABLET, EXTENDED RELEASE ORAL at 10:05

## 2022-04-21 RX ADMIN — ATORVASTATIN CALCIUM 80 MG: 40 TABLET, FILM COATED ORAL at 22:31

## 2022-04-21 RX ADMIN — GABAPENTIN 200 MG: 100 CAPSULE ORAL at 10:05

## 2022-04-21 RX ADMIN — LANTHANUM CARBONATE 1000 MG: 500 TABLET, CHEWABLE ORAL at 11:22

## 2022-04-21 RX ADMIN — ISOSORBIDE MONONITRATE 30 MG: 30 TABLET, EXTENDED RELEASE ORAL at 10:05

## 2022-04-21 RX ADMIN — INSULIN GLARGINE-YFGN 5 UNITS: 100 INJECTION, SOLUTION SUBCUTANEOUS at 22:29

## 2022-04-21 RX ADMIN — BRIMONIDINE TARTRATE 1 DROP: 2 SOLUTION/ DROPS OPHTHALMIC at 22:31

## 2022-04-21 RX ADMIN — GABAPENTIN 200 MG: 100 CAPSULE ORAL at 22:30

## 2022-04-21 RX ADMIN — BRIMONIDINE TARTRATE 1 DROP: 2 SOLUTION/ DROPS OPHTHALMIC at 10:06

## 2022-04-21 RX ADMIN — TIMOLOL MALEATE 1 DROP: 5 SOLUTION OPHTHALMIC at 10:07

## 2022-04-21 RX ADMIN — GABAPENTIN 200 MG: 100 CAPSULE ORAL at 13:32

## 2022-04-21 RX ADMIN — TICAGRELOR 90 MG: 90 TABLET ORAL at 22:31

## 2022-04-21 RX ADMIN — ALLOPURINOL 100 MG: 100 TABLET ORAL at 10:05

## 2022-04-21 RX ADMIN — PIPERACILLIN AND TAZOBACTAM 3375 MG: 3; .375 INJECTION, POWDER, LYOPHILIZED, FOR SOLUTION INTRAVENOUS at 13:33

## 2022-04-21 RX ADMIN — PANTOPRAZOLE SODIUM 40 MG: 40 TABLET, DELAYED RELEASE ORAL at 05:45

## 2022-04-21 RX ADMIN — LATANOPROST 1 DROP: 50 SOLUTION OPHTHALMIC at 22:31

## 2022-04-21 RX ADMIN — PIPERACILLIN AND TAZOBACTAM 3375 MG: 3; .375 INJECTION, POWDER, LYOPHILIZED, FOR SOLUTION INTRAVENOUS at 02:10

## 2022-04-21 RX ADMIN — TICAGRELOR 90 MG: 90 TABLET ORAL at 11:23

## 2022-04-21 RX ADMIN — Medication 3 MG: at 22:31

## 2022-04-21 RX ADMIN — OXYCODONE AND ACETAMINOPHEN 1 TABLET: 5; 325 TABLET ORAL at 10:07

## 2022-04-21 RX ADMIN — OXYCODONE AND ACETAMINOPHEN 1 TABLET: 5; 325 TABLET ORAL at 22:30

## 2022-04-21 RX ADMIN — LANTHANUM CARBONATE 1000 MG: 500 TABLET, CHEWABLE ORAL at 17:29

## 2022-04-21 RX ADMIN — BUMETANIDE 2 MG: 1 TABLET ORAL at 10:06

## 2022-04-21 ASSESSMENT — PAIN SCALES - GENERAL
PAINLEVEL_OUTOF10: 10
PAINLEVEL_OUTOF10: 0
PAINLEVEL_OUTOF10: 7

## 2022-04-21 ASSESSMENT — PAIN DESCRIPTION - LOCATION
LOCATION: BACK
LOCATION: LEG
LOCATION: LEG

## 2022-04-21 ASSESSMENT — PAIN DESCRIPTION - DESCRIPTORS: DESCRIPTORS: ACHING;CRAMPING;SHARP;SHOOTING

## 2022-04-21 ASSESSMENT — PAIN SCALES - WONG BAKER
WONGBAKER_NUMERICALRESPONSE: 0
WONGBAKER_NUMERICALRESPONSE: 0

## 2022-04-21 ASSESSMENT — PAIN DESCRIPTION - ORIENTATION
ORIENTATION: RIGHT
ORIENTATION: RIGHT

## 2022-04-21 NOTE — PROGRESS NOTES
Physical Therapy  Treatment Note    Name: Bina Parry  : 1955  MRN: [id number]      Date of Service: 2022    Evaluating PT:  Carolina Darling PT, DPT [id number]    Room #:  2297/5057-I  Diagnosis:  Unable to ambulate [R26.2]  Intractable low back pain [M54.59]  Ambulatory dysfunction [R26.2]  PMHx/PSHx:  Acute infection of bone, anemia, arthritis, CAD, CHF, carpal tunnel, ESRD on HD, diabetic retinopathy, glaucoma, HLD, HTN, DM, vitreous hemorrhage  Procedure/Surgery:  None this admission  Precautions:  Falls  Equipment Needs:  None, patient has FWW, SPC, and transport chair    SUBJECTIVE:    Pt lives alone in a 1 story home with level entry. Bed is on 1st floor and bath is on 1st floor. Pt ambulated with FWW PTA. OBJECTIVE:   Initial Evaluation  Date: 22 Treatment  Date: 22 Short Term/ Long Term   Goals   AM-PAC 6 Clicks 55/43 67/91    Was pt agreeable to Eval/treatment? yes yes    Does pt have pain? 8/10 LBP 10/10 back pain during mobility    Bed Mobility  Rolling: Pravin  Supine to sit: Pravin  Sit to supine: Pravin  Scooting: Pravin Rolling: min A  Supine to sit: min A  Sit to supine: min A  Scooting: min A Rolling: Independent   Supine to sit: Independent   Sit to supine: Independent   Scooting: Independent    Transfers Sit to stand: Pravin  Stand to sit: Pravin  Stand pivot: NT, unable Sit to stand: min A  Stand to sit: min A  Stand pivot: min A with ww Sit to stand: Independent   Stand to sit: Independent   Stand pivot: Mod I with [employer]   Ambulation    few side steps at EOB with [employer] Pravin 2'x2 with ww min A  (steps from bed<>chair) 50 feet with [employer] Mod I   Stair negotiation: ascended and descended  NT NT TBD   ROM BUE:  Defer to OT note  BLE:  WFL     Strength BUE:  Defer to OT note  BLE:  Grossly 3/5  WFL   Balance Sitting EOB:  SBA  Dynamic Standing:  Pravin with [employer] Sitting EOB:  SBA  Dynamic Standing:  Pravin with ww Sitting EOB:  Independent   Dynamic Standing:   Mod I with [employer]     Pt is A & O x 4  Sensation:  Denies abnormalities  Edema:  None noted    Patient education  Pt educated on safety with functional mobility    Patient response to education:   Pt verbalized understanding Pt demonstrated skill Pt requires further education in this area   yes partial yes     ASSESSMENT:  Comments:    Pt supine in bed upon entering, pt agreeable to participate. Pt instructed to transfer to EOB, pt cued for log roll technique and assisted with trunk to transfer pt to EOB. Pt sitting upright at EOB with good static sitting balance. Pt having difficulty getting at comfortable position 2/2 back pain. Pt cued for hand placement and instructed to stand from EOB. Pt standing with fair static standing balance with ww. Pt found to be incontinent of stool and was assisted with hygiene while in standing position. Pt tolerable to standing for ~2' and requested to return to seated position. Pt was able to complete stand pivot transfer to bedside chair after short rest break, cueing was provided for hand placement and positioning. Pt was unable to tolerate sitting in bedside chair and was assisted back to bed and transferred to supine position. Pt made comfortable, all needs met and call bell in reach prior to exiting. Nursing aware of pt's performance. Treatment:  Patient practiced and was instructed in the following treatment:    · Bed mobility training - pt given verbal and tactile cues to facilitate proper sequencing and safety during rolling and supine<>sit as well as provided with physical assistance to complete task   · STS and pivot transfer training - pt educated on proper hand and foot placement, safety and sequencing, and use of verbal and tactile cues to safely complete sit<>stand and pivot transfers with physical assistance to complete task safely   · Skilled positioning - Pt placed in the chair position with pillows utilized to facilitate upright posture, joint and skin integrity, and interaction with environment. PLAN:    Patient is making poor progress towards established goals 2/2 pain limiting performance. Will continue with current POC.       Time in  0935  Time out  1000    Total Treatment Time  25 minutes     CPT codes:  [] Gait training 15101 -- minutes  [] Manual therapy [id number] -- minutes  [x] Therapeutic activities 59337 25 minutes  [] Therapeutic exercises 50119 -- minutes  [] Neuromuscular reeducation 70847 -- minutes    Jonathon Govea PT, DPT  [id number]

## 2022-04-21 NOTE — PROGRESS NOTES
Infectious Disease  Progress Note  [facility]    Chief Complaint: R/O  lumbar osteomyelitis    Subjective:  Reports low back pain, denies bowel or bladder incontinence. Has constipation. Afebrile     Scheduled Meds:   piperacillin-tazobactam  3,375 mg IntraVENous Q12H    vancomycin (VANCOCIN) intermittent dosing (placeholder)   Other RX Placeholder    atorvastatin  80 mg Oral Nightly    allopurinol  100 mg Oral Daily    b complex-C-folic acid  1 mg Oral Nightly    bumetanide  2 mg Oral Once per day on Sun Tue Thu    gabapentin  200 mg Oral TID    hydrALAZINE  10 mg Oral BID    insulin glargine-yfgn  5 Units SubCUTAneous Nightly    isosorbide mononitrate  30 mg Oral Daily    latanoprost  1 drop Right Eye Nightly    pantoprazole  40 mg Oral QAM AC    metoprolol succinate  25 mg Oral Daily    ticagrelor  90 mg Oral BID    lanthanum  1,000 mg Oral TID WC    brimonidine  1 drop Right Eye BID    And    timolol  1 drop Right Eye BID     Continuous Infusions:   dextrose       PRN Meds:sodium chloride flush, fentanNYL, lidocaine-prilocaine, midodrine, acetaminophen, melatonin, polyethylene glycol, glucose, dextrose, glucagon (rDNA), dextrose, ondansetron, oxyCODONE-acetaminophen    ROS:  As mentioned in subjective, all other systems negative      BP (!) 118/57   Pulse 60   Temp 97 °F (36.1 °C) (Temporal)   Resp 18   Ht 5' 10\" (1.778 m)   Wt 176 lb 5.9 oz (80 kg)   SpO2 93%   BMI 25.31 kg/m²     Physical Exam  Constitutional: The patient is awake, alert, and oriented. Skin: Warm and dry. No jaundice. HEENT: Eyes show round, and reactive pupils. Neck: Supple to movements. No lymphadenopathy. Chest: Clear to auscultation posteriorly  Cardiovascular: S1 and S2 are rhythmic and regular. No murmurs appreciated. Abdomen: Soft nontender  Extremities: No clubbing, no cyanosis. Right UE lymphedema.   Musculoskeletal:  No edema, strength good, AV fistula ok   Tender L spine   Neurological: Alert and oriented x 3,   Lines: right upper chest port accessed    CBC with Differential:      Lab Results   Component Value Date    WBC 5.1 04/21/2022    RBC 2.66 04/21/2022    HGB 7.3 04/21/2022    HCT 24.8 04/21/2022     04/21/2022    MCV 93.2 04/21/2022    MCH 27.4 04/21/2022    MCHC 29.4 04/21/2022    RDW 17.4 04/21/2022    NRBC 0.0 02/18/2017    SEGSPCT 70 03/12/2014    BANDSPCT 1 02/18/2015    LYMPHOPCT 24.2 04/18/2022    PROMYELOPCT 1.8 02/18/2017    MONOPCT 14.0 04/18/2022    MYELOPCT 0.9 10/24/2017    BASOPCT 0.6 04/18/2022    MONOSABS 0.75 04/18/2022    LYMPHSABS 1.29 04/18/2022    EOSABS 0.18 04/18/2022    BASOSABS 0.03 04/18/2022       CMP:    Lab Results   Component Value Date     04/21/2022    K 4.0 04/21/2022    K 4.6 04/13/2022     04/21/2022    CO2 25 04/21/2022    BUN 19 04/21/2022    CREATININE 3.5 04/21/2022    GFRAA 16 04/21/2022    LABGLOM 16 04/21/2022    GLUCOSE 118 04/21/2022    GLUCOSE 46 04/02/2012    PROT 5.6 04/21/2022    LABALBU 2.8 04/21/2022    LABALBU 3.8 04/02/2012    CALCIUM 8.9 04/21/2022    BILITOT 0.3 04/21/2022    ALKPHOS 100 04/21/2022    AST 21 04/21/2022    ALT 5 04/21/2022       Hepatic Function Panel:    Lab Results   Component Value Date    ALKPHOS 100 04/21/2022    ALT 5 04/21/2022    AST 21 04/21/2022    PROT 5.6 04/21/2022    BILITOT 0.3 04/21/2022    BILIDIR <0.2 05/25/2021    IBILI see below 05/25/2021    LABALBU 2.8 04/21/2022    LABALBU 3.8 04/02/2012         Microbiology :  Blood culture - neg to date   Component 4/18/22 1154   Gram Stain Orderable Gram stain performed from swab, interpret results with   caution. Swab specimens of sterile fluids are inferior to   aspirate specimens for organism recovery. Rare Polymorphonuclear leukocytes   Epithelial cells not seen   Rare Gram positive coccobacillary organisms      anerobic culture: showed coag negative staph.    Aerobic cx: no growth  Surgical path: did show acute/chronic osteomyelitis. Assessment:  · Lumbar spine osteomyelitis s/p bone biopsy on 4/18: discussed with lab both aerobic and anerobic cx showing growth. · ESRD on HD:     Plan:    · continue IV vancomycin (pharmacy to dose)  · continue zosyn 3.375mg IV Q12  · Will follow cx to finalization and adjust antibiotics   · Will follow with you.      Electronically signed by Abby Blanc MD on 4/21/2022 at 2:46 PM

## 2022-04-21 NOTE — PROGRESS NOTES
Hospitalist Progress Note      SYNOPSIS: Patient admitted on 2022 for Unable to ambulate has a past medical history that includes spinal stenosis, ESRD on dialysis, chronic anemia, hypertension, hyperlipidemia, diabetes mellitus, gout, coronary artery disease, mitral valve papillary fibroblastoma.     Claudia presented to the ER with back pain and issues with ambulation. She states she was unable to get out of bed to go to dialysis today. The patient has significant heart disease including valvular disease that anesthesiology has stated as a barrier to her doing surgery on her back.  Unfortunately, until she is able to ambulate effectively for post surgical rehab, the patient cannot have corrective surgery on her heart either.  Due to this difficult position, the patient was treated about a month ago with a nerve block of her back which was effective for about a month and she was able to do physical therapy. She was recently discharged from SNF on 4/3. However today pain caused patient to be unable to go to dialysis today. She denies any bowel or bladder incontinence.     CT of the lumbar spine showed new loss of height in L1 inferior endplate with increased soft tissue inflammation. Findings suspicious for possible osteomyelitis.     Patient has spinal cord stimulator so is unable to have MRI    S/p bone biopsy     S/p KIARA  LV systolic function mildly reduced.    Ejection fraction is visually estimated at 40-45%.    Moderate mitral annular calcification.    Valvular and subvalvular calcification.    Mild mitral regurgitation.    No definitive evidence of papillary fibroelastoma. Awaiting final antibiotic recs for lumbar osteo      SUBJECTIVE:  Stable overnight. No other overnight issues reported. Records reviewed. Awaiting final antibiotics  Growing anaerobes      Temp (24hrs), Av.5 °F (36.4 °C), Min:97.3 °F (36.3 °C), Max:97.8 °F (36.6 °C)    DIET: ADULT DIET; Regular;  Low Sodium peripheral pulses present; no peripheral edema; no ulcers  Musculoskeletal: No clubbing, cyanosis, no bilateral lower extremity edema. Brisk capillary refill. Skin:  No rashes  on visible skin  Neurologic: awake, alert and following commands     ASSESSMENT and PLAN:  · Back pain with spinal stenosis and possible osteomyelitis of lumbar spine -Neurosurgery consultation. Pain control. ID consulted. Patient was unable to get MRI due to stimulator. S/p bone biopsy 4/18. Started on zosyn/vanc. Awaiting final abx recs/cultures. Growing anaerobes  · CAD- with h/o LAD and RCA stents 5/2021 and with cath 12/21 showing patent LAD stent, high grade focal in stent restenosis of the RCA and known  of OM2. Per cardio, repeat KIARA with Dr. Lanre Jeffery during this admission and then discuss with cardiothoracic surgery. Since she has had no stroke and if indeed there is no mitral valve abnormality of significance then she may just need a RCA PCI. TTE  7/20 LV systolic function mildly reduced.  Ejection fraction is visually estimated at 40-45%. Moderate mitral annular calcification. Valvular and subvalvular calcification. Mild mitral regurgitation. No definitive evidence of papillary fibroelastoma. ·   · Mitral valve Papillary fibroelastoma- per cardio,  did not see evidence of the described mitral valve fibroblastoma. There may be a small aortic valve fibroblastoma but the mitral valve abnormality seems to be most likely annular calcification. · End-stage renal disease on hemodialysis- Monday, Wednesday, Friday, consult nephrology for renal replacement therapy. · Chronic anemia secondary to end-stage renal disease: Monitor H&H  · Essential hypertension: Continue outpatient medications with hydralazine and metoprolol.   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  · Jehovah Witness          Medications:  REVIEWED DAILY    Infusion Medications    dextrose       Scheduled Medications    piperacillin-tazobactam  3,375 mg IntraVENous Q12H    vancomycin (VANCOCIN) intermittent dosing (placeholder)   Other RX Placeholder    atorvastatin  80 mg Oral Nightly    allopurinol  100 mg Oral Daily    b complex-C-folic acid  1 mg Oral Nightly    bumetanide  2 mg Oral Once per day on Sun Tue Thu    gabapentin  200 mg Oral TID    hydrALAZINE  10 mg Oral BID    insulin glargine-yfgn  5 Units SubCUTAneous Nightly    isosorbide mononitrate  30 mg Oral Daily    latanoprost  1 drop Right Eye Nightly    pantoprazole  40 mg Oral QAM AC    metoprolol succinate  25 mg Oral Daily    [Held by provider] ticagrelor  90 mg Oral BID    lanthanum  1,000 mg Oral TID WC    brimonidine  1 drop Right Eye BID    And    timolol  1 drop Right Eye BID     PRN Meds: sodium chloride flush, fentanNYL, lidocaine-prilocaine, midodrine, acetaminophen, melatonin, polyethylene glycol, glucose, dextrose, glucagon (rDNA), dextrose, ondansetron, oxyCODONE-acetaminophen    Labs:     Recent Labs     04/19/22  0513 04/20/22  0629 04/21/22  0430   WBC 5.9 4.0* 5.1   HGB 8.6* 9.0* 7.3*   HCT 28.6* 30.2* 24.8*    258 226       Recent Labs     04/20/22  0629 04/21/22  0430    135   K 4.5 4.0   CL 99 100   CO2 25 25   BUN 32* 19   CREATININE 5.6* 3.5*   CALCIUM 9.4 8.9       Recent Labs     04/20/22  0629 04/21/22  0430   PROT 5.9* 5.6*   ALKPHOS 104 100   ALT 7 5   AST 24 21   BILITOT 0.4 0.3       No results for input(s): INR in the last 72 hours. No results for input(s): Mike Mcdaniel in the last 72 hours.     Chronic labs:    Lab Results   Component Value Date    CHOL 100 04/15/2022    TRIG 107 04/15/2022    HDL 27 04/15/2022    LDLCALC 52 04/15/2022    TSH 1.500 04/15/2022    INR 1.0 01/20/2022 LABA1C 5.5 04/15/2022       Radiology: REVIEWED DAILY    +++++++++++++++++++++++++++++++++++++++++++++++++  Ellen Payne [address]  +++++++++++++++++++++++++++++++++++++++++++++++++  NOTE: This report was transcribed using voice recognition software. Every effort was made to ensure accuracy; however, inadvertent computerized transcription errors may be present.

## 2022-04-21 NOTE — PROGRESS NOTES
Progress Note  4/21/2022 11:40 AM  Subjective:   Admit Date: 4/13/2022  PCP: Ravi Heart MD  Interval History: Patient examined , Back pain persists     Diet: ADULT DIET; Regular; Low Sodium (2 gm); Low Potassium (Less than 3000 mg/day); Low Phosphorus (Less than 1000 mg)  ADULT ORAL NUTRITION SUPPLEMENT; Lunch; Renal Oral Supplement  ADULT ORAL NUTRITION SUPPLEMENT; Dinner; Frozen Oral Supplement    Data:   Scheduled Meds:   piperacillin-tazobactam  3,375 mg IntraVENous Q12H    vancomycin (VANCOCIN) intermittent dosing (placeholder)   Other RX Placeholder    atorvastatin  80 mg Oral Nightly    allopurinol  100 mg Oral Daily    b complex-C-folic acid  1 mg Oral Nightly    bumetanide  2 mg Oral Once per day on Sun Tue Thu    gabapentin  200 mg Oral TID    hydrALAZINE  10 mg Oral BID    insulin glargine-yfgn  5 Units SubCUTAneous Nightly    isosorbide mononitrate  30 mg Oral Daily    latanoprost  1 drop Right Eye Nightly    pantoprazole  40 mg Oral QAM AC    metoprolol succinate  25 mg Oral Daily    ticagrelor  90 mg Oral BID    lanthanum  1,000 mg Oral TID WC    brimonidine  1 drop Right Eye BID    And    timolol  1 drop Right Eye BID     Continuous Infusions:   dextrose       PRN Meds:sodium chloride flush, fentanNYL, lidocaine-prilocaine, midodrine, acetaminophen, melatonin, polyethylene glycol, glucose, dextrose, glucagon (rDNA), dextrose, ondansetron, oxyCODONE-acetaminophen  I/O last 3 completed shifts: In: 1202.4 [P.O.:300; IV Piggyback:602.4]  Out: 900   No intake/output data recorded.     Intake/Output Summary (Last 24 hours) at 4/21/2022 1140  Last data filed at 4/20/2022 1730  Gross per 24 hour   Intake 550.77 ml   Output --   Net 550.77 ml     CBC:   Recent Labs     04/19/22  0513 04/20/22  0629 04/21/22  0430   WBC 5.9 4.0* 5.1   HGB 8.6* 9.0* 7.3*    258 226     BMP:    Recent Labs     04/20/22  0629 04/21/22  0430    135   K 4.5 4.0   CL 99 100   CO2 25 25   BUN 32* 19   CREATININE 5.6* 3.5*   GLUCOSE 100* 118*     Hepatic:   Recent Labs     04/20/22  0629 04/21/22  0430   AST 24 21   ALT 7 5   BILITOT 0.4 0.3   ALKPHOS 104 100     Troponin: No results for input(s): TROPONINI in the last 72 hours. BNP: No results for input(s): BNP in the last 72 hours. Lipids: No results for input(s): CHOL, HDL in the last 72 hours. Invalid input(s): LDLCALCU  ABGs: No results found for: PHART, PO2ART, YGD2TOU  INR: No results for input(s): INR in the last 72 hours. -----------------------------------------------------------------  RAD: CT LUMBAR SPINE WO CONTRAST    Result Date: 4/13/2022  EXAMINATION: CT OF THE LUMBAR SPINE WITHOUT CONTRAST  4/13/2022 TECHNIQUE: CT of the lumbar spine was performed without the administration of intravenous contrast. Multiplanar reformatted images are provided for review. Adjustment of mA and/or kV according to patient size was utilized. Dose modulation, iterative reconstruction, and/or weight based adjustment of the mA/kV was utilized to reduce the radiation dose to as low as reasonably achievable. COMPARISON: CT lumbar spine March 8, 2022 HISTORY: ORDERING SYSTEM PROVIDED HISTORY: pain, no trauma TECHNOLOGIST PROVIDED HISTORY: Reason for exam:->pain, no trauma Decision Support Exception - unselect if not a suspected or confirmed emergency medical condition->Emergency Medical Condition (MA) What reading provider will be dictating this exam?->CRC FINDINGS: BONES/ALIGNMENT: Demonstrated is posterior fixation hardware seen from L3-L5 with decompressive laminectomy. No evidence of hardware complications. L1-L2: There is soft tissue edema adjacent to the L1 disc. There is new loss of height at the L1 inferior endplate extending into the vertebral body when compared to prior exam.  There is pressure free hypertrophy of the ligamentum flavum and protrusion of the intervertebral disc resulting in moderate spinal canal stenosis.   Again demonstrated is moderate to severe neural foraminal stenosis. L2-L3: Again demonstrated is severe loss of height at L2 with anterolisthesis L2 on L3 measuring approximately 1.4 cm. Severe spinal canal stenosis again demonstrated at L2-L3. Severe bilateral neural foraminal stenosis. L3-L4: Status post laminectomy and posterior fixation. Limited evaluation due to hardware streak artifacts. Again demonstrated is severe loss of disc height. Mild neural foraminal stenosis. L4-L5: Status post laminectomy and posterior fixation. Limited evaluation due to hardware streak artifacts. Again demonstrated is severe loss of disc height. Mild neural foraminal stenosis. L5-S1: Mild to moderate moderate to severe loss of disc height with vacuum phenomenon. There is mild spinal canal stenosis. Facet hypertrophy identified. Moderate right and severe left neural foraminal stenosis. SOFT TISSUES/RETROPERITONEUM: No paraspinal mass is seen. New loss of height of L1 inferior endplate with increased soft tissue inflammation. Finding is suspicious for possible osteomyelitis. Further evaluation with lumbar spine MRI may be considered. Objective:   Vitals: BP (!) 118/57   Pulse 60   Temp 97 °F (36.1 °C) (Temporal)   Resp 18   Ht 5' 10\" (1.778 m)   Wt 176 lb 5.9 oz (80 kg)   SpO2 93%   BMI 25.31 kg/m²   General appearance: appears stated age   Skin:  No rashes or lesions  HEENT: Head: Normocephalic, no lesions, without obvious abnormality.   Neck: no adenopathy, no carotid bruit, no JVD, supple, symmetrical, trachea midline and thyroid not enlarged, symmetric, no tenderness/mass/nodules  Lungs: clear to auscultation bilaterally  Heart: regular rate and rhythm, S1, S2 normal, no murmur, click, rub or gallop  Abdomen: soft, non-tender; bowel sounds normal; no masses,  no organomegaly  Extremities: extremities normal, atraumatic, no cyanosis or edema  Neurologic: Mental status: Alert, oriented, thought content appropriate    Assessment: Patient Active Problem List:     Diabetic retinopathy ([address]     Malignant neoplasm of right female breast ([address]     Atherosclerosis of native coronary artery of native heart without angina pectoris     Moderate obesity     Left ventricular hypertrophy     Herniated lumbar intervertebral disc     Lumbar degenerative disc disease     Pseudomeningocele     Lumbar radiculopathy     Lymphedema of arm     CKD (chronic kidney disease) stage 4, GFR 15-29 ml/min ([facility])     Insulin dependent type 2 diabetes mellitus (HCC)     Anemia of chronic disease     Chronic diastolic CHF (congestive heart failure) (HCC)     Neuropathy     Hypertension     Glaucoma     Refusal of blood product     Pancytopenia ([address]     Controlled type 2 diabetes mellitus with chronic kidney disease on chronic dialysis, with long-term current use of insulin ([address]     Vitreous hemorrhage ([address]     Patient is [employer]     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 ([facility])     Hyperkalemia, diminished renal excretion     Chronic pain syndrome     Lumbar post-laminectomy syndrome     Myalgia     Cervicalgia     Diabetic peripheral neuropathy (HCC)     ESRD (end stage renal disease) (HCC)     Bilateral carpal tunnel syndrome     Spinal stenosis of lumbar region with neurogenic claudication     Cardiac arrest ([address]     ESRD (end stage renal disease) on dialysis ([address]     Mixed hyperlipidemia     Lymphedema of right upper extremity     Coronary artery disease involving native coronary artery of native heart with angina pectoris (HCC)     Ventricular tachycardia (HCC)     Mitral valve disease     CAD in native artery     Lumbar stenosis without neurogenic claudication     Lumbar foraminal stenosis     Back pain     Intractable low back pain     Unable to ambulate    Plan:   1)End-stage kidney disease: Continue hemodialysis Monday Wednesday and Friday via a patent and well-developed left upper arm fistula, dialysis as planned      2) Controlled hypertension with chronic kidney disease     3) Anemia with chronic kidney disease: Transfuse as needed     4) Back pain: Neurosurgery and ID following, s/p Bone biopsy  .Treated for       Lumber spine OM ( anaerobic organisms )        Mitchell Snow MD

## 2022-04-21 NOTE — PROGRESS NOTES
Pharmacy Consultation Note  (Antibiotic Dosing and Monitoring)    Initial consult date: 4-  Consulting physician/provider: Dr. Haroon Cantu  Drug: Vancomycin  Indication: Osteomyelitis (L1-L2)    Age/Gender Height Weight IBW  Allergy Information   67 y.o./female 5' 10\" (177.8 cm) 172 lb (78 kg)     Ideal body weight: 68.5 kg (151 lb 0.2 oz)  Adjusted ideal body weight: 73.1 kg (161 lb 2.5 oz)   Furosemide      Renal Function: ESRD (HD: MWF)     Vancomycin Monitoring:  Trough:  No results for input(s): VANCOTROUGH in the last 72 hours. Random:    Recent Labs     04/19/22  0510   VANCORANDOM 15.1       Vancomycin Administration Times:  Recent vancomycin administrations                   vancomycin (VANCOCIN) 750 mg in dextrose 5 % 250 mL IVPB (mg) 750 mg New Bag 04/20/22 1442    vancomycin 1500 mg in dextrose 5% 300 mL IVPB (mg) 1,500 mg New Bag 04/18/22 1841              Assessment:  · 66 yo/F admitted 4/13 for back pain. Found to have L1-L2 osteo (recent epidural nerve block 1 month PTA). · ATBs held until after Bone Bx (done today, 4/18) which was delayed from 4/14/2022 pending Cards clearance. · S/P HD session this AM (4/18) and Bone Bx. · To dose vancomycin, pharmacy will be utilizing dosing based off of levels due to patient requiring hemodialysis. · 4/19: Vancomycin level at 05:10 = 15.1 mcg/mL, no HD ordered for today on MWF schedule. · 4/20: Patient in hemodialysis today, scheduled for 4 hours. WBC 4, patient afebrile in last 24 hours. · 4/21: no hemodialysis scheduled for today, WBC WNL, afebrile. Plan:  · No HD, no vancomycin today  · Will check vancomycin levels prior to HD tomorrow 4/22  · Will continue to monitor dialysis orders. · Clinical pharmacy to follow.     Reina Causey, PharmD   Pharmacy Resident   Phone: [phone]  4/21/2022 10:51 AM

## 2022-04-21 NOTE — PROGRESS NOTES
OT POC based on physician orders, patient diagnosis and results of clinical assessment    Frequency/Duration 2-5 days/wk for 2 weeks PRN   Specific OT Treatment Interventions to include:   * Instruction/training on adapted ADL techniques and AE recommendations to increase functional independence within precautions       * Training on energy conservation strategies, correct breathing pattern and techniques to improve independence/tolerance for self-care routine  * Functional transfer/mobility training/DME recommendations for increased independence, safety, and fall prevention  * Patient/Family education to increase follow through with safety techniques and functional independence  * Recommendation of environmental modifications for increased safety with functional transfers/mobility and ADLs  * Cognitive retraining/development of therapeutic activities to improve problem solving, judgement, memory, and attention for increased safety/participation in ADL/IADL tasks  * Sensory re-education to improve body/limb awareness, maintain/improve skin integrity, and improve hand/UE motor function  * Visual-perceptual training to improve environmental scanning, visual attention/focus, and oculomotor skills for increased safety/independence with functional transfers/mobility and ADLs  * Splinting/positioning for increased function, prevention of contractures, and improve skin integrity  * Therapeutic exercise to improve motor endurance, ROM, and functional strength for ADLs/functional transfers  * Therapeutic activities to facilitate/challenge dynamic balance, stand tolerance for increased safety and independence with ADLs  * Therapeutic activities to facilitate gross/fine motor skills for increased independence with ADLs  * Neuro-muscular re-education: facilitation of righting/equilibrium reactions, midline orientation, scapular stability/mobility, normalization of muscle tone, and facilitation of volitional active controled movement  * Positioning to improve skin integrity, interaction with environment and functional independence    Recommended Adaptive Equipment/DME: BSC, LB AE PRN, TBD     Home Living: Pt lives alone in 1 floor condo with 0 NIKKO and bed and bath on main floor. Bathroom setup: tub/shower, standard commode   Equipment owned: shower chair, grab bar, walker, transport chair    Prior Level of Function: Assist with ADLs , Assist with IADLs; ambulated with FWW PTA. Patient planning to receive assist from aid follow SNF stay. Driving: No  Occupation: None stated    Pain Level: 4/10 Back pain, Abdominal Pain at rest in semi-supine prior to initiation of ax, increased to 8/10 back pain     Cognition: A&O: 4/4; Follows 2 step directions   Memory: G-   Sequencing: F+   Problem solving: F+   Judgement/safety: F+  Pt required extended time and rest breaks w/ each task    Vitals Assessed: 117/57 seated EOB     Functional Assessment:  AM-PAC Daily Activity Raw Score: 14/24   Initial Eval Status  Date: 4/17/22 Treatment Status  Date:  4-21-22 STGs = LTGs  Time frame: 10-14 days   Feeding Stand by Assist   Pt limited by BUE ROM deficits d/t pain. IND after set up    In high salgado position Modified [place]    Grooming Minimal Assist  While seated at EOB to wash face. Assist d/t limited ROM and pain. Min A/Set up    In High salgado position Modified [place]   Seated for ADLs   UB Dressing Maximal Assist   While seated at EOB to don gown around backside. Mod A/Set up    Donning/doffing gown in high salgado position, assist w/ Functional reach w/ R UE d/t limited AROM and pain, pt ed, VCs for adaptive techs   Minimal Assist   LB Dressing Dependent    to don pants at EOB.   Pt limited by pain, however reports prior ability to don pants without assist. Max A    Max A donning socks, threading LEs into pants, pulling pants over hips + min A for safety w/ standing balance during ax, VCs for safety  (Pants simulated)   Moderate Assist    Bathing Dependent Max A     Simulated sponge bathing seated/standing   Moderate Assist    Toileting Dependent  Max A      Max A for bowel hygiene + Max A to pul pants over hips + Min A for safety w/ standing balance during ax, VCs for safety  (Pants simulated)   Moderate Assist    Bed Mobility  Supine to sit: Minimal Assist   Sit to supine: Minimal Assist  Supine to sit: Minimal Assist   Sit to supine: Minimal Assist  Supine to sit: Modified [place]   Sit to supine: Modified [place]    Functional Transfers Minimal Assist   For STS from EOB. Minimal Assist     For STS from EOB 3x, Chair 2x, VCs for safety, hand placement   Supervision    Functional Mobility Minimal Assist with FWW  For side steps at EOB. Minimal Assist     Short distance along EOB Bed<>Chair with FWW, VCs for safety   Supervision   With least restrictive AD. Balance Sitting:     Static:  Min A    Dynamic: Min a  Standing: Min A    during functional activity Sitting:     Static:  Remote SUP unsupported at EOB    Dynamic: Close SUP w/ functional ax EOB    Standing: Min A w/ [employer]    during functional activity/mobility   Sitting:     Static:  Ind    Dynamic:S  Standing: S   Activity Tolerance Poor with light activity. Fair    Able to participate in extended session [facility]   Visual/  Perceptual Glasses: Yes  No changes per pt. Safety F+  G-     Hand Dominance: R   AROM (PROM) Strength Additional Info:    RUE  AROM  Shoulder flx: 0-20  Limited by pain. PROM WFL 3/5 Fair(+)  and wfl FMC/dexterity noted during ADL tasks       LUE AROM    Shoulder flx: 0-50  Limited by pain. PROM WFL 4/5 Fair(+)  and wfl FMC/dexterity noted during ADL tasks       Hearing: [facility]   Sensation:  No c/o numbness or tingling  Tone: WFL  Edema: Unremarkable. Comments: Patient cleared by nursing. Upon arrival patient supine in bed , educated on the role of OT, and agreeable to OT session. No family present for session today.  OT facilitated ADLs, bed mobility, functional transfers with focus on safety, body mechanics, and pain reduction. At end of session, patient supine in bed , properly positioned, oriented to call light, with call light and phone within reach, all lines and tubes intact. Nursing notified. Overall patient demonstrated decreased independence and safety during completion of ADL/functional transfer/mobility tasks. Pt would benefit from continued skilled OT to increase safety and independence with completion of ADL/IADL tasks for functional independence and quality of life. Treatment: OT treatment provided this date includes:    Instruction/training on safety and adapted techniques for completion of ADLs: to increase independence in self-care.   Instruction/training on safe functional mobility/transfer techniques: with focus on safety, body mechanics, and precautions    Instruction/training on energy conservation/work simplification for completion of ADLs:. techniques to increase independence with self-care ADLs and IADLs, work simplification to improve endurance.   Proper Positioning/Alignment: for optimal healing, skin integrity, to prevent breakdown, decrease edema, and reduce risk of contracture.  Therapeutic activity: to challenge dynamic sitting/standing balance and endurance to promote safety during ADL tasks and functional transfers and mobility. Rehab Potential: Good  for established goals     Patient / Family Goal:  Participate in post-acute therapy program prior to returning home.       Time In: 3858  Time Out: 1003  Total Treatment Time: 56 minutes    Min Units   OT Eval Low 54262       OT Eval Medium 34144      OT Eval High [id number]      OT Re-Eval [id number]       Therapeutic Ex [id number]       Therapeutic Activities 38662       ADL/Self Care 69819  66 4   Orthotic Management 26037       Manual [id number]     Neuro Re-Ed 29847       Non-Billable Time          Blanka Aw, MOT, OTR/L  # [id number]

## 2022-04-21 NOTE — CARE COORDINATION
Spoke with Dr. Issa Alcazar, will likely need the weekend to determine treatment for pending cultures. Changed to Q12 zosyn yesterday through accessed port and vanco with HD. Checking with [facility]in to see if they can provide antibiotics through port. Will follow up based on decision. Will require new auth prior to discharge. Pasrr and ambulette on soft chart. For questions I can be reached at [phone].  [place], [place]

## 2022-04-22 VITALS
RESPIRATION RATE: 16 BRPM | TEMPERATURE: 97.4 F | HEIGHT: 70 IN | WEIGHT: 178.13 LBS | OXYGEN SATURATION: 93 % | DIASTOLIC BLOOD PRESSURE: 52 MMHG | BODY MASS INDEX: 25.5 KG/M2 | HEART RATE: 58 BPM | SYSTOLIC BLOOD PRESSURE: 135 MMHG

## 2022-04-22 LAB
ALBUMIN SERPL-MCNC: 2.5 G/DL (ref 3.5–5.2)
ALP BLD-CCNC: 97 U/L (ref 35–104)
ALT SERPL-CCNC: <5 U/L (ref 0–32)
ANION GAP SERPL CALCULATED.3IONS-SCNC: 12 MMOL/L (ref 7–16)
AST SERPL-CCNC: 20 U/L (ref 0–31)
BILIRUB SERPL-MCNC: 0.4 MG/DL (ref 0–1.2)
BUN BLDV-MCNC: 26 MG/DL (ref 6–23)
CALCIUM SERPL-MCNC: 8.8 MG/DL (ref 8.6–10.2)
CHLORIDE BLD-SCNC: 104 MMOL/L (ref 98–107)
CO2: 25 MMOL/L (ref 22–29)
CREAT SERPL-MCNC: 4.5 MG/DL (ref 0.5–1)
GFR AFRICAN AMERICAN: 12
GFR NON-AFRICAN AMERICAN: 12 ML/MIN/1.73
GLUCOSE BLD-MCNC: 118 MG/DL (ref 74–99)
HCT VFR BLD CALC: 26.5 % (ref 34–48)
HEMOGLOBIN: 7.9 G/DL (ref 11.5–15.5)
MCH RBC QN AUTO: 27.3 PG (ref 26–35)
MCHC RBC AUTO-ENTMCNC: 29.8 % (ref 32–34.5)
MCV RBC AUTO: 91.7 FL (ref 80–99.9)
METER GLUCOSE: 81 MG/DL (ref 74–99)
METER GLUCOSE: 99 MG/DL (ref 74–99)
PDW BLD-RTO: 17.2 FL (ref 11.5–15)
PLATELET # BLD: 230 E9/L (ref 130–450)
PMV BLD AUTO: 10.2 FL (ref 7–12)
POTASSIUM SERPL-SCNC: 4.1 MMOL/L (ref 3.5–5)
RBC # BLD: 2.89 E12/L (ref 3.5–5.5)
SARS-COV-2, NAAT: NOT DETECTED
SODIUM BLD-SCNC: 141 MMOL/L (ref 132–146)
TOTAL PROTEIN: 5.3 G/DL (ref 6.4–8.3)
VANCOMYCIN RANDOM: 15.8 MCG/ML (ref 5–40)
WBC # BLD: 4.2 E9/L (ref 4.5–11.5)

## 2022-04-22 PROCEDURE — 87635 SARS-COV-2 COVID-19 AMP PRB: CPT

## 2022-04-22 PROCEDURE — 6360000002 HC RX W HCPCS: Performed by: STUDENT IN AN ORGANIZED HEALTH CARE EDUCATION/TRAINING PROGRAM

## 2022-04-22 PROCEDURE — 85027 COMPLETE CBC AUTOMATED: CPT

## 2022-04-22 PROCEDURE — 82962 GLUCOSE BLOOD TEST: CPT

## 2022-04-22 PROCEDURE — 6360000002 HC RX W HCPCS

## 2022-04-22 PROCEDURE — 2580000003 HC RX 258

## 2022-04-22 PROCEDURE — 2580000003 HC RX 258: Performed by: STUDENT IN AN ORGANIZED HEALTH CARE EDUCATION/TRAINING PROGRAM

## 2022-04-22 PROCEDURE — 36415 COLL VENOUS BLD VENIPUNCTURE: CPT

## 2022-04-22 PROCEDURE — 97535 SELF CARE MNGMENT TRAINING: CPT

## 2022-04-22 PROCEDURE — 90935 HEMODIALYSIS ONE EVALUATION: CPT | Performed by: INTERNAL MEDICINE

## 2022-04-22 PROCEDURE — 36592 COLLECT BLOOD FROM PICC: CPT

## 2022-04-22 PROCEDURE — 6370000000 HC RX 637 (ALT 250 FOR IP): Performed by: NEUROLOGICAL SURGERY

## 2022-04-22 PROCEDURE — 6360000002 HC RX W HCPCS: Performed by: NURSE PRACTITIONER

## 2022-04-22 PROCEDURE — 2580000003 HC RX 258: Performed by: NEUROLOGICAL SURGERY

## 2022-04-22 PROCEDURE — 80053 COMPREHEN METABOLIC PANEL: CPT

## 2022-04-22 PROCEDURE — 80202 ASSAY OF VANCOMYCIN: CPT

## 2022-04-22 RX ORDER — HEPARIN SODIUM (PORCINE) LOCK FLUSH IV SOLN 100 UNIT/ML 100 UNIT/ML
100 SOLUTION INTRAVENOUS PRN
Status: DISCONTINUED | OUTPATIENT
Start: 2022-04-22 | End: 2022-04-22 | Stop reason: HOSPADM

## 2022-04-22 RX ORDER — INSULIN LISPRO 100 [IU]/ML
0-6 INJECTION, SOLUTION INTRAVENOUS; SUBCUTANEOUS
Status: DISCONTINUED | OUTPATIENT
Start: 2022-04-22 | End: 2022-04-22 | Stop reason: HOSPADM

## 2022-04-22 RX ORDER — INSULIN LISPRO 100 [IU]/ML
0-3 INJECTION, SOLUTION INTRAVENOUS; SUBCUTANEOUS NIGHTLY
Status: DISCONTINUED | OUTPATIENT
Start: 2022-04-22 | End: 2022-04-22 | Stop reason: HOSPADM

## 2022-04-22 RX ADMIN — SODIUM CHLORIDE, PRESERVATIVE FREE 10 ML: 5 INJECTION INTRAVENOUS at 17:23

## 2022-04-22 RX ADMIN — ISOSORBIDE MONONITRATE 30 MG: 30 TABLET, EXTENDED RELEASE ORAL at 11:24

## 2022-04-22 RX ADMIN — ALLOPURINOL 100 MG: 100 TABLET ORAL at 11:24

## 2022-04-22 RX ADMIN — PANTOPRAZOLE SODIUM 40 MG: 40 TABLET, DELAYED RELEASE ORAL at 05:18

## 2022-04-22 RX ADMIN — LANTHANUM CARBONATE 1000 MG: 500 TABLET, CHEWABLE ORAL at 11:24

## 2022-04-22 RX ADMIN — HYDRALAZINE HYDROCHLORIDE 10 MG: 10 TABLET, FILM COATED ORAL at 11:24

## 2022-04-22 RX ADMIN — PIPERACILLIN AND TAZOBACTAM 3375 MG: 3; .375 INJECTION, POWDER, LYOPHILIZED, FOR SOLUTION INTRAVENOUS at 01:39

## 2022-04-22 RX ADMIN — TIMOLOL MALEATE 1 DROP: 5 SOLUTION OPHTHALMIC at 11:25

## 2022-04-22 RX ADMIN — GABAPENTIN 200 MG: 100 CAPSULE ORAL at 11:23

## 2022-04-22 RX ADMIN — BRIMONIDINE TARTRATE 1 DROP: 2 SOLUTION/ DROPS OPHTHALMIC at 11:25

## 2022-04-22 RX ADMIN — HYDRALAZINE HYDROCHLORIDE 10 MG: 10 TABLET, FILM COATED ORAL at 17:23

## 2022-04-22 RX ADMIN — METOPROLOL SUCCINATE 25 MG: 25 TABLET, EXTENDED RELEASE ORAL at 11:24

## 2022-04-22 RX ADMIN — OXYCODONE AND ACETAMINOPHEN 1 TABLET: 5; 325 TABLET ORAL at 18:26

## 2022-04-22 RX ADMIN — VANCOMYCIN HYDROCHLORIDE 1000 MG: 1 INJECTION, POWDER, LYOPHILIZED, FOR SOLUTION INTRAVENOUS at 13:44

## 2022-04-22 RX ADMIN — Medication 100 UNITS: at 17:23

## 2022-04-22 RX ADMIN — TICAGRELOR 90 MG: 90 TABLET ORAL at 11:24

## 2022-04-22 RX ADMIN — OXYCODONE AND ACETAMINOPHEN 1 TABLET: 5; 325 TABLET ORAL at 05:21

## 2022-04-22 RX ADMIN — OXYCODONE AND ACETAMINOPHEN 1 TABLET: 5; 325 TABLET ORAL at 11:29

## 2022-04-22 ASSESSMENT — PAIN SCALES - WONG BAKER: WONGBAKER_NUMERICALRESPONSE: 0

## 2022-04-22 ASSESSMENT — PAIN SCALES - GENERAL
PAINLEVEL_OUTOF10: 0
PAINLEVEL_OUTOF10: 9
PAINLEVEL_OUTOF10: 7
PAINLEVEL_OUTOF10: 0
PAINLEVEL_OUTOF10: 7

## 2022-04-22 ASSESSMENT — PAIN DESCRIPTION - DESCRIPTORS: DESCRIPTORS: ACHING;DISCOMFORT;SHARP;SHOOTING

## 2022-04-22 ASSESSMENT — PAIN DESCRIPTION - ORIENTATION: ORIENTATION: RIGHT

## 2022-04-22 ASSESSMENT — PAIN DESCRIPTION - PROGRESSION: CLINICAL_PROGRESSION: NOT CHANGED

## 2022-04-22 ASSESSMENT — PAIN DESCRIPTION - LOCATION: LOCATION: BACK;LEG

## 2022-04-22 NOTE — CARE COORDINATION
Patient will need 9 doses of vanco with HD. Spoke with teresa, they will resubmit auth this am, if it returns will arrange discharge. Updated [facility], they will administer vanco will HD but will need sent with patient from \[facility]\"" [facility]. Updated facility and they will send med with patient to HD. Script on the chart. Pasrr and ambulette on soft chart. Auth obtained for [facility]. Facility to send transport van at 6:30pm. Friend at bedside notified of discharge and patient called and notified brother while we were speaking. Will need covid resulted prior to discharge. For questions I can be reached at [phone].  Alison Vee, [facility]

## 2022-04-22 NOTE — PROGRESS NOTES
Hospitalist Progress Note      Synopsis: Patient admitted for inability to ambulate 2/2 to back pain. Claudia presented to the ER with back pain and issues with ambulation.  She states she was unable to get out of bed to go to dialysis today.  The patient has significant heart disease including valvular disease that anesthesiology has stated as a barrier to her doing surgery on her back.  Unfortunately, until she is able to ambulate effectively for post surgical rehab, the patient cannot have corrective surgery on her heart either.  Due to this difficult position, the patient was treated about a month ago with a nerve block of her back which was effective for about a month and she was able to do physical therapy. Jasmyn Ferguson was recently discharged from SNF on 4/3. Kaleta Idalia today pain caused patient to be unable to go to dialysis today.  She denies any bowel or bladder incontinence. CT of the lumbar spine was suspicious for possible osteomyelitis. She has spinal cord stimulator so is unable to have MRI. She is S/p bone biopsy on  and KIARA on  LV systolic function mildly reduced with ejection fraction of 40-45%. Moderate mitral annular calcification. Valvular and subvalvular calcification. Mild mitral regurgitation. No definitive evidence of papillary fibroelastoma. ID and NSGY are following. Final cultures revealed cons. Vancomycin was prescribed for discharge to receive dialysis. She is stable for discharge however awaiting bed at [facility]. Hospital day 9     Subjective:  Stable overnight. No issues reported. Patient seen and examined. Lying in bed eating. Family at beside. She reports no complaints. Records reviewed. Temp (24hrs), Av.2 °F (36.2 °C), Min:97 °F (36.1 °C), Max:97.3 °F (36.3 °C)    DIET: ADULT DIET; Regular; Low Sodium (2 gm); Low Potassium (Less than 3000 mg/day);  Low Phosphorus (Less than 1000 mg)  ADULT ORAL NUTRITION SUPPLEMENT; Lunch; Renal Oral Supplement  ADULT ORAL NUTRITION SUPPLEMENT; Dinner; Frozen Oral Supplement  CODE: Full Code    Intake/Output Summary (Last 24 hours) at 4/22/2022 0841  Last data filed at 4/22/2022 0522  Gross per 24 hour   Intake 943.13 ml   Output 0 ml   Net 943.13 ml       Review of Systems: All bolded are positive; please see HPI  General:  Fever, chills, diaphoresis, fatigue, malaise, night sweats, weight loss  Psychological:  Anxiety, disorientation, hallucinations. ENT:  Epistaxis, headaches, vertigo, visual changes. Cardiovascular:  Chest pain, irregular heartbeats, palpitations, paroxysmal nocturnal dyspnea. Respiratory:  Shortness of breath, coughing, sputum production, hemoptysis, wheezing, orthopnea. Gastrointestinal:  Nausea, vomiting, diarrhea, heartburn, constipation, abdominal pain, hematemesis, hematochezia, melena, acholic stools  Genito-Urinary:  Dysuria, urgency, frequency, hematuria  Musculoskeletal:  Joint pain, joint stiffness, joint swelling, muscle pain  Neurology:  Headache, focal neurological deficits, weakness, numbness, paresthesia  Derm:  Rashes, ulcers, excoriations, bruising  Extremities:  Decreased ROM, peripheral edema, mottling    Objective:    BP (!) 113/41   Pulse 55   Temp 97.3 °F (36.3 °C) (Oral)   Resp 18   Ht 5' 10\" (1.778 m)   Wt 181 lb 7 oz (82.3 kg)   SpO2 98%   BMI 26.03 kg/m²     General appearance: Elderly female in no apparent distress, appears stated age and cooperative. HEENT: Conjunctivae/corneas clear. Mucous membranes moist.  Neck: Supple. No JVD. Respiratory:  Clear to auscultation bilaterally. Normal respiratory effort. Cardiovascular:  RRR. S1, S2 without MRG. PV: Pulses palpable. No edema. Abdomen: Soft, non-tender, non-distended. +BS  Musculoskeletal: No obvious deformities. Skin: Normal skin color. No rashes or lesions. Good turgor. Neurologic:  Grossly non-focal. Awake, alert, following commands.    Psychiatric: Alert and oriented, thought content appropriate, normal insight and judgement    Medications:  REVIEWED DAILY    Infusion Medications    dextrose       Scheduled Medications    piperacillin-tazobactam  3,375 mg IntraVENous Q12H    vancomycin (VANCOCIN) intermittent dosing (placeholder)   Other RX Placeholder    atorvastatin  80 mg Oral Nightly    allopurinol  100 mg Oral Daily    b complex-C-folic acid  1 mg Oral Nightly    bumetanide  2 mg Oral Once per day on Sun Tue Thu    gabapentin  200 mg Oral TID    hydrALAZINE  10 mg Oral BID    insulin glargine-yfgn  5 Units SubCUTAneous Nightly    isosorbide mononitrate  30 mg Oral Daily    latanoprost  1 drop Right Eye Nightly    pantoprazole  40 mg Oral QAM AC    metoprolol succinate  25 mg Oral Daily    ticagrelor  90 mg Oral BID    lanthanum  1,000 mg Oral TID WC    brimonidine  1 drop Right Eye BID    And    timolol  1 drop Right Eye BID     PRN Meds: sodium chloride flush, fentanNYL, lidocaine-prilocaine, midodrine, acetaminophen, melatonin, polyethylene glycol, glucose, dextrose, glucagon (rDNA), dextrose, ondansetron, oxyCODONE-acetaminophen    Labs:     Recent Labs     04/20/22  0629 04/21/22  0430 04/22/22  0439   WBC 4.0* 5.1 4.2*   HGB 9.0* 7.3* 7.9*   HCT 30.2* 24.8* 26.5*    226 230       Recent Labs     04/20/22  0629 04/21/22  0430 04/22/22  0439    135 141   K 4.5 4.0 4.1   CL 99 100 104   CO2 25 25 25   BUN 32* 19 26*   CREATININE 5.6* 3.5* 4.5*   CALCIUM 9.4 8.9 8.8       Recent Labs     04/20/22  0629 04/21/22  0430 04/22/22  0439   PROT 5.9* 5.6* 5.3*   ALKPHOS 104 100 97   ALT 7 5 <5   AST 24 21 20   BILITOT 0.4 0.3 0.4       No results for input(s): INR in the last 72 hours. No results for input(s): Jadene Moh in the last 72 hours.     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    Plan:  ID, NSGY, and nephro following  Zosyn stopped, to continue Vanco with HD  Continue PT/OT    DVT Prophylaxis [] Lovenox  []  Heparin [] DOAC [x] PCDs [] Ambulation    GI Prophylaxis [x] PPI  [] H2 Blocker   [] Carafate  [] Diet/Tube Feeds   Level of care [x] Med/Surg  [] Intermediate  []  ICU   Diet ADULT DIET; Regular; Low Sodium (2 gm); Low Potassium (Less than 3000 mg/day); Low Phosphorus (Less than 1000 mg)  ADULT ORAL NUTRITION SUPPLEMENT; Lunch; Renal Oral Supplement  ADULT ORAL NUTRITION SUPPLEMENT; Dinner; Frozen Oral Supplement    Family contact []  N/A    [] At bedside  [] Phone call     Discharge Plan: Stable for discharge, awaiting bed at [facility].    +++++++++++++++++++++++++++++++++++++++++++++++++  ALETA Granados/ Eren [address]  +++++++++++++++++++++++++++++++++++++++++++++++++  NOTE: This report was transcribed using voice recognition software. Every effort was made to ensure accuracy; however, inadvertent computerized transcription errors may be present.

## 2022-04-22 NOTE — PROGRESS NOTES
Progress Note  4/22/2022 9:44 AM  Subjective:   Admit Date: 4/13/2022  PCP: Shaheen Pittman MD    4/22/2022: Seen during hemodialysis which she states back pain persists although improved:    Diet: ADULT DIET; Regular; Low Sodium (2 gm); Low Potassium (Less than 3000 mg/day); Low Phosphorus (Less than 1000 mg)  ADULT ORAL NUTRITION SUPPLEMENT; Lunch; Renal Oral Supplement  ADULT ORAL NUTRITION SUPPLEMENT; Dinner; Frozen Oral Supplement    Data:   Scheduled Meds:   insulin lispro  0-6 Units SubCUTAneous TID WC    insulin lispro  0-3 Units SubCUTAneous Nightly    vancomycin (VANCOCIN) intermittent dosing (placeholder)   Other RX Placeholder    atorvastatin  80 mg Oral Nightly    allopurinol  100 mg Oral Daily    b complex-C-folic acid  1 mg Oral Nightly    bumetanide  2 mg Oral Once per day on Sun Tue Thu    gabapentin  200 mg Oral TID    hydrALAZINE  10 mg Oral BID    insulin glargine-yfgn  5 Units SubCUTAneous Nightly    isosorbide mononitrate  30 mg Oral Daily    latanoprost  1 drop Right Eye Nightly    pantoprazole  40 mg Oral QAM AC    metoprolol succinate  25 mg Oral Daily    ticagrelor  90 mg Oral BID    lanthanum  1,000 mg Oral TID WC    brimonidine  1 drop Right Eye BID    And    timolol  1 drop Right Eye BID     Continuous Infusions:   dextrose       PRN Meds:sodium chloride flush, fentanNYL, lidocaine-prilocaine, midodrine, acetaminophen, melatonin, polyethylene glycol, glucose, dextrose, glucagon (rDNA), dextrose, ondansetron, oxyCODONE-acetaminophen  I/O last 3 completed shifts: In: 943.1 [P.O.:580; IV Piggyback:363.1]  Out: 0   No intake/output data recorded.     Intake/Output Summary (Last 24 hours) at 4/22/2022 0944  Last data filed at 4/22/2022 0522  Gross per 24 hour   Intake 943.13 ml   Output 0 ml   Net 943.13 ml     CBC:   Recent Labs     04/20/22  0629 04/21/22  0430 04/22/22  0439   WBC 4.0* 5.1 4.2*   HGB 9.0* 7.3* 7.9*    226 230     BMP:    Recent Labs 04/20/22  0629 04/21/22  0430 04/22/22 0439    135 141   K 4.5 4.0 4.1   CL 99 100 104   CO2 25 25 25   BUN 32* 19 26*   CREATININE 5.6* 3.5* 4.5*   GLUCOSE 100* 118* 118*     Hepatic:   Recent Labs     04/20/22  0629 04/21/22  0430 04/22/22 0439   AST 24 21 20   ALT 7 5 <5   BILITOT 0.4 0.3 0.4   ALKPHOS 104 100 97     Troponin: No results for input(s): TROPONINI in the last 72 hours. BNP: No results for input(s): BNP in the last 72 hours. Lipids: No results for input(s): CHOL, HDL in the last 72 hours. Invalid input(s): LDLCALCU  ABGs: No results found for: PHART, PO2ART, [id number]  INR: No results for input(s): INR in the last 72 hours. -----------------------------------------------------------------  RAD: CT LUMBAR SPINE WO CONTRAST    Result Date: 4/13/2022  EXAMINATION: CT OF THE LUMBAR SPINE WITHOUT CONTRAST  4/13/2022 TECHNIQUE: CT of the lumbar spine was performed without the administration of intravenous contrast. Multiplanar reformatted images are provided for review. Adjustment of mA and/or kV according to patient size was utilized. Dose modulation, iterative reconstruction, and/or weight based adjustment of the mA/kV was utilized to reduce the radiation dose to as low as reasonably achievable. COMPARISON: CT lumbar spine March 8, 2022 HISTORY: ORDERING SYSTEM PROVIDED HISTORY: pain, no trauma TECHNOLOGIST PROVIDED HISTORY: Reason for exam:->pain, no trauma Decision Support Exception - unselect if not a suspected or confirmed emergency medical condition->Emergency Medical Condition (MA) What reading provider will be dictating this exam?->CRC FINDINGS: BONES/ALIGNMENT: Demonstrated is posterior fixation hardware seen from L3-L5 with decompressive laminectomy. No evidence of hardware complications. L1-L2: There is soft tissue edema adjacent to the L1 disc.   There is new loss of height at the L1 inferior endplate extending into the vertebral body when compared to prior exam.  There is pressure free hypertrophy of the ligamentum flavum and protrusion of the intervertebral disc resulting in moderate spinal canal stenosis. Again demonstrated is moderate to severe neural foraminal stenosis. L2-L3: Again demonstrated is severe loss of height at L2 with anterolisthesis L2 on L3 measuring approximately 1.4 cm. Severe spinal canal stenosis again demonstrated at L2-L3. Severe bilateral neural foraminal stenosis. L3-L4: Status post laminectomy and posterior fixation. Limited evaluation due to hardware streak artifacts. Again demonstrated is severe loss of disc height. Mild neural foraminal stenosis. L4-L5: Status post laminectomy and posterior fixation. Limited evaluation due to hardware streak artifacts. Again demonstrated is severe loss of disc height. Mild neural foraminal stenosis. L5-S1: Mild to moderate moderate to severe loss of disc height with vacuum phenomenon. There is mild spinal canal stenosis. Facet hypertrophy identified. Moderate right and severe left neural foraminal stenosis. SOFT TISSUES/RETROPERITONEUM: No paraspinal mass is seen. New loss of height of L1 inferior endplate with increased soft tissue inflammation. Finding is suspicious for possible osteomyelitis. Further evaluation with lumbar spine MRI may be considered. Objective:   Vitals: BP (!) 121/40   Pulse 53   Temp 97.3 °F (36.3 °C) (Oral)   Resp 18   Ht 5' 10\" (1.778 m)   Wt 181 lb 7 oz (82.3 kg)   SpO2 98%   BMI 26.03 kg/m²   General appearance: appears stated age   Skin:  No rashes or lesions  HEENT: Head: Normocephalic, no lesions, without obvious abnormality.   Neck: no adenopathy, no carotid bruit, no JVD, supple, symmetrical, trachea midline and thyroid not enlarged, symmetric, no tenderness/mass/nodules  Lungs: clear to auscultation bilaterally  Heart: regular rate and rhythm, S1, S2 normal, no murmur, click, rub or gallop  Abdomen: soft, non-tender; bowel sounds normal; no masses,  no /plan:   1)End-stage kidney disease: Continue hemodialysis Monday Wednesday and Friday via a patent and well-developed left upper arm fistula, dialysis as planned      2) Controlled hypertension with chronic kidney disease     3) Anemia with chronic kidney disease: Transfuse as needed     4) Back pain: Neurosurgery and ID following, s/p Bone biopsy  .Treated for       Lumber spine osteomyelitis  Continue IV vancomycin 750 mg with each hemodialysis treatment for 6 additional weeks   ID input noted         Eleno Alfaro MD     Department of Internal Medicine  Section of Nephrology  Dialysis Note        PROCEDURE:  Patient seen on hemodialysis     PH[facility]ICIAN:  [facility] Sykes M.D., [facility]    INDICATION:  End-stage renal disease    RX:  See dialysis flowsheet for specifics on access, blood flow rate, dialysate baths, duration of dialysis, anticoagulation and other technical information.     COMMENTS:  Procedure in progress and tolerated       Eleno Alfaro MD, MD

## 2022-04-22 NOTE — PROGRESS NOTES
[address] [id number] [address]          Date:2022                                                   Patient Name: Dirk Fleischer     MRN: [id number]     : 1955     Room: [address] [place] [address]       Evaluating OT: Prince Mckenna OTD, OTR/L, [id number]      Referring Provider: Amol Daly DO    Specific Provider Orders/Date: OT eval and treat (4/15/22)    Diagnosis: Unable to ambulate [R26.2]  Intractable low back pain [M54.59]  Ambulatory dysfunction [R26.2]      Surgeries/Procedures:  None this admission      Pt admitted from home with Back Pain, Abdominal Pain, recent stay at SNF. Pertinent Medical History:       has a past medical history of Acute infection of bone ([facility] [address] Acute osteomyelitis of phalanx of left hand ([address] Anemia of chronic disease, Arthritis, Breast cancer ([facility] [address] CAD (coronary artery disease), Carpal tunnel syndrome, Chronic diastolic CHF (congestive heart failure) ([address] CKD (chronic kidney disease) stage 4, GFR 15-29 ml/min ([facility] [address] Diabetic retinopathy ([facility] [address] Glaucoma, Hemodialysis patient ([facility] [address] Hyperkalemia, diminished renal excretion, Hyperlipidemia, Hypertension, 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        Precautions:  Fall Risk, chronic pain  Lymphedema, limited ROM R UE     Assessment of current deficits    [x] Functional mobility  [x]ADLs  [x] Strength               []Cognition    [x] Functional transfers   [x] IADLs         [x] Safety Awareness   [x]Endurance    [x] Fine Coordination              [x] Balance      [] Vision/perception   []Sensation     []Gross Motor Coordination  [] ROM  [] Delirium                   [] Motor Control     OT PLAN OF CARE   OT POC based on physician orders, patient diagnosis and results of clinical assessment    Frequency/Duration 2-5 days/wk for 2 weeks PRN   Specific OT Treatment Interventions to include:   * Instruction/training on adapted ADL techniques and AE recommendations to increase functional independence within precautions       * Training on energy conservation strategies, correct breathing pattern and techniques to improve independence/tolerance for self-care routine  * Functional transfer/mobility training/DME recommendations for increased independence, safety, and fall prevention  * Patient/Family education to increase follow through with safety techniques and functional independence  * Recommendation of environmental modifications for increased safety with functional transfers/mobility and ADLs  * Cognitive retraining/development of therapeutic activities to improve problem solving, judgement, memory, and attention for increased safety/participation in ADL/IADL tasks  * Sensory re-education to improve body/limb awareness, maintain/improve skin integrity, and improve hand/UE motor function  * Visual-perceptual training to improve environmental scanning, visual attention/focus, and oculomotor skills for increased safety/independence with functional transfers/mobility and ADLs  * Splinting/positioning for increased function, prevention of contractures, and improve skin integrity  * Therapeutic exercise to improve motor endurance, ROM, and functional strength for ADLs/functional transfers  * Therapeutic activities to facilitate/challenge dynamic balance, stand tolerance for increased safety and independence with ADLs  * Therapeutic activities to facilitate gross/fine motor skills for increased independence with ADLs  * Neuro-muscular re-education: facilitation of righting/equilibrium reactions, midline orientation, scapular stability/mobility, normalization of muscle tone, and facilitation of volitional active controled movement  * Positioning to improve skin integrity, interaction with environment and functional independence    Recommended Adaptive Equipment/DME: BSC, LB AE PRN, TBD     Home Living: Pt lives alone in 1 floor condo with 0 NIKKO and bed and bath on main floor. Bathroom setup: tub/shower, standard commode   Equipment owned: shower chair, grab bar, walker, transport chair    Prior Level of Function: Assist with ADLs , Assist with IADLs; ambulated with FWW PTA. Patient planning to receive assist from aid follow SNF stay. Driving: No  Occupation: None stated    Pain Level: 3/10 Back pain, Abdominal Pain at rest in semi-supine prior to initiation of ax, increased to 5/10 back pain with ax    Cognition: A&O: 4/4; Follows 2 step directions   Memory: G-   Sequencing: F+   Problem solving: F+   Judgement/safety: F+  Pt required extended time and rest breaks w/ each task         Functional Assessment:  AM-PAC Daily Activity Raw Score: 14/24   Initial Eval Status  Date: 4/17/22 Treatment Status  Date:  4-22-22 STGs = LTGs  Time frame: 10-14 days   Feeding Stand by Assist   Pt limited by BUE ROM deficits d/t pain. IND after set up    Required good set up of tray, cutting of food, opening of containers, properly positioned in semi-supine to improve functional reach - unable to tolerate high salgado position for ax d/t back/abdominal pain  Unable to tolerate sitting in chair    Modified [place]    Grooming Minimal Assist  While seated at EOB to wash face. Assist d/t limited ROM and pain. Min A/Set up    In High salgado position  VCs for adaptive techs r/t limited ROM R UE   Modified [place]   Seated for ADLs   UB Dressing Maximal Assist   While seated at EOB to don gown around backside.  Mod A/Set up    Donning/doffing gown in high salgado position, assist w/ Functional reach w/ R UE d/t limited AROM and pain, pt ed, VCs for adaptive techs   Minimal Assist   LB Dressing Dependent    to don independence with completion of ADL/IADL tasks for functional independence and quality of life. Treatment: OT treatment provided this date includes:    Instruction/training on safety and adapted techniques for completion of ADLs: to increase independence in self-care.   Instruction/training on safe functional mobility/transfer techniques: with focus on safety, body mechanics, and precautions    Instruction/training on energy conservation/work simplification for completion of ADLs:. techniques to increase independence with self-care ADLs and IADLs, work simplification to improve endurance.   Proper Positioning/Alignment: for optimal healing, skin integrity, to prevent breakdown, decrease edema, and reduce risk of contracture.  Therapeutic activity: to challenge dynamic sitting/standing balance and endurance to promote safety during ADL tasks and functional transfers and mobility. Rehab Potential: Good  for established goals     Patient / Family Goal:  Participate in post-acute therapy program prior to returning home.       Time In: 1325  Time Out: 1349  Total Treatment Time: 24 minutes    Min Units   OT Eval Low [id number]       OT Eval Medium 07789      OT Eval High [id number]      OT Re-Eval [id number]       Therapeutic Ex [id number]       Therapeutic Activities 24534       ADL/Self Care 95039  24 2   Orthotic Management 04801       Manual 82677     Neuro Re-Ed 84840       Non-Billable Time          Glenna Adams, MOT, OTR/L  # [id number]

## 2022-04-22 NOTE — PLAN OF CARE
Problem: Pain:  Description: Pain management should include both nonpharmacologic and pharmacologic interventions.   Goal: Pain level will decrease  Description: Pain level will decrease  Outcome: Completed  Goal: Control of acute pain  Description: Control of acute pain  Outcome: Completed  Goal: Control of chronic pain  Description: Control of chronic pain  Outcome: Completed     Problem: Falls - Risk of:  Goal: Will remain free from falls  Description: Will remain free from falls  Outcome: Completed  Goal: Absence of physical injury  Description: Absence of physical injury  Outcome: Completed     Problem: Mobility - Impaired:  Goal: Mobility will improve  Description: Mobility will improve  Outcome: Completed     Problem: Skin Integrity:  Goal: Will show no infection signs and symptoms  Description: Will show no infection signs and symptoms  Outcome: Completed  Goal: Absence of new skin breakdown  Description: Absence of new skin breakdown  Outcome: Completed     Problem: Discharge Planning  Goal: Discharge to home or other facility with appropriate resources  Outcome: Completed     Problem: Chronic Conditions and Co-morbidities  Goal: Patient's chronic conditions and co-morbidity symptoms are monitored and maintained or improved  Outcome: Completed     Problem: Nutrition Deficit:  Goal: Optimize nutritional status  Outcome: Completed     Problem: ABCDS Injury Assessment  Goal: Absence of physical injury  Outcome: Completed

## 2022-04-22 NOTE — DISCHARGE SUMMARY
Hospitalist Discharge Summary    Patient ID: Isak Fu   Patient : 1955  Patient's PCP: Donal Llanes MD    Admit Date: 2022   Admitting Physician: Natasha Perez DO    Discharge Date:  2022   Discharge Physician: GOYO Chacko NP   Discharge Condition: Stable  Discharge Disposition: [address] course in brief:  (Please refer to daily progress notes for a comprehensive review of the hospitalization by requesting medical records)    Patient admitted for inability to ambulate 2/2 to back pain.      Claudia presented to the ER with back pain and issues with ambulation.  She states she was unable to get out of bed to go to dialysis today.  The patient has significant heart disease including valvular disease that anesthesiology has stated as a barrier to her doing surgery on her back.  Unfortunately, until she is able to ambulate effectively for post surgical rehab, the patient cannot have corrective surgery on her heart either.  Due to this difficult position, the patient was treated about a month ago with a nerve block of her back which was effective for about a month and she was able to do physical therapy. Autumn Gastelum was recently discharged from SNF on 4/3. Mark Menghini today pain caused patient to be unable to go to dialysis today.  She denies any bowel or bladder incontinence. CT of the lumbar spine was suspicious for possible osteomyelitis. She has spinal cord stimulator so is unable to have MRI. She is S/p bone biopsy on  and KIARA on 3/42 LV systolic function mildly reduced with ejection fraction of 40-45%. Moderate mitral annular calcification. Valvular and subvalvular calcification. Mild mitral regurgitation. No definitive evidence of papillary fibroelastoma.   ID and NSGY are following. Final cultures revealed cons. Vancomycin was prescribed for discharge to receive dialysis. She is stable for discharge to [facility].      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 Follow-up with consultants as indicated by them. Compliance with medications as prescribed on discharge. Future Appointments   Date Time Provider Dev Arizmendi   5/24/2022  7:30 AM MD [facility]   8/2/2022  8:40 AM GOYO Benitez - [facility]       The patient's condition is stable. At this time the patient is without objective evidence of an acute process requiring continuing hospitalization or inpatient management. They are stable for discharge with outpatient follow-up. I have spoken with the patient and discussed the results of the current hospitalization, in addition to providing specific details for the plan of care and counseling regarding the diagnosis and prognosis. The plan has been discussed in detail and they are aware of the specific conditions for emergent return, as well as the importance of follow-up. Their questions are answered at this time and they are agreeable with the plan for discharge [address]     Continued appropriate risk factor modification of blood pressure, diabetes and serum lipids will remain essential to reducing risk of future atherosclerotic development    Activity: activity as tolerated      Significant labs:  CBC:   Recent Labs     04/20/22  0629 04/21/22 0430 04/22/22 0439   WBC 4.0* 5.1 4.2*   RBC 3.27* 2.66* 2.89*   HGB 9.0* 7.3* 7.9*   HCT 30.2* 24.8* 26.5*   MCV 92.4 93.2 91.7   RDW 17.4* 17.4* 17.2*    226 230     BMP:   Recent Labs     04/20/22 0629 04/21/22 0430 04/22/22 0439    135 141   K 4.5 4.0 4.1   CL 99 100 104   CO2 25 25 25   BUN 32* 19 26*   CREATININE 5.6* 3.5* 4.5*     LFT:  Recent Labs     04/20/22 0629 04/21/22 0430 04/22/22 0439   PROT 5.9* 5.6* 5.3*   ALKPHOS 104 100 97   ALT 7 5 <5   AST 24 21 20   BILITOT 0.4 0.3 0.4     PT/INR: No results for input(s): INR, APTT in the last 72 hours. BNP: No results for input(s): BNP in the last 72 hours.   Hgb A1C:   Lab Results   Component Value Date    LABA1C 5.5 04/15/2022     Folate and B12:   Lab Results   Component Value Date    DZSXPJIS21 2831 (H) 02/17/2017   ,   Lab Results   Component Value Date    FOLATE >20.0 02/17/2017     Thyroid Studies:   Lab Results   Component Value Date    TSH 1.500 04/15/2022    X3VSPPN 5.0 12/24/2012       Urinalysis:    Lab Results   Component Value Date    NITRU Negative 11/07/2017    WBCUA 1-3 11/07/2017    WBCUA NONE 08/27/2011    BACTERIA MODERATE 11/07/2017    RBCUA 0-1 11/07/2017    RBCUA 1-3 02/13/2013    BLOODU Negative 11/07/2017    SPECGRAV 1.025 11/07/2017    GLUCOSEU Negative 11/07/2017    GLUCOSEU NEGATIVE 08/27/2011       Imaging:  ECHO Transesophageal    Result Date: 4/19/2022  Transesophageal Echocardiography Report (KIARA)   Demographics   Patient Name       Alma Gross  Gender               Female                     G   Medical Record     [id number]       Room Number          4506  Number   Account #          [de-identified]      Procedure Date       04/19/2022   Corporate ID                      Ordering Physician   Jackie Gamboa MD   Accession Number   [id number]     Referring Physician   Date of Birth      1955     Sonographer          Juliana Jang RDCS   Age                78 year(s)     Interpreting         Alicia Kent Elbert Sport MD                                    Physician                                     Any Other  Procedure Type of Study   KIARA procedure:Transesophageal Echo (KIARA), Doppler Echocardiography, Color  Flow Velocity Mapping. Procedure Date Date: 04/19/2022 Start: 12:32 PM Study Location: Portable Technical Quality: Good visualization Indications:Mitral Valve Disorder. Patient Status: Routine Height: 70 inches Weight: 175 pounds BSA: 1.97 m^2 BMI: 25.11 kg/m^2 Rhythm: Within normal limits KIARA Performed By: the attending and the sonographer  Type of Anesthesia: Moderate sedation  Allergies   - Other drug:(Lasix). Findings   Left Ventricle  Normal left ventricular size. LV systolic function mildly reduced. Ejection fraction is visually estimated at 40-45%. Right Ventricle  Normal right ventricular size and function. Left Atrium  Dilated left atrium. No evidence of thrombus within left atrium or appendage. The interatrial septum appears intact. Right Atrium  Normal right atrial size. No evidence of thrombus or mass in the right atrium. Mitral Valve  Moderate mitral annular calcification. Valvular and subvalvular calcification. No evidence of mitral valve stenosis. Mild mitral regurgitation. No definitive evidence of papillary fibroelastoma. Tricuspid Valve  The tricuspid valve appears structurally normal.  Mild tricuspid regurgitation. Aortic Valve  Aortic valve leaflets are mildly calcified. The aortic valve is trileaflet. No hemodynamically significant aortic stenosis is present. No evidence of aortic valve regurgitation. No definitive PFE. Pulmonic Valve  Pulmonic valve is structurally normal.  No evidence of any pulmonic regurgitation. Pericardial Effusion  No evidence of pericardial effusion. Aorta  Minimal atherosclerotic disease of the descending thoracic aorta.   Miscellaneous  LUPV: Normal flow  LLPV: Normal flow  RUPV: Normal flow   Conclusions   Summary  LV systolic function mildly reduced. Ejection fraction is visually estimated at 40-45%. Moderate mitral annular calcification. Valvular and subvalvular calcification. Mild mitral regurgitation. No definitive evidence of papillary fibroelastoma. Signature   ----------------------------------------------------------------  Electronically signed by Zahida Dietz MD(Interpreting  physician) on 04/19/2022 06:29 PM  ----------------------------------------------------------------  Doppler Measurements & Calculations   MV Peak E-Wave: 0.93 m/s   MV Peak Gradient: 3.5 mmHg  MV Mean Gradient: 1.6 mmHg  MV Mean Velocity: 0.6 m/s  MV P1/2t: 80.2 msec  MVA by PHT:2.74 cm^2  [email or web address] [id number]%3d%3d    CT LUMBAR SPINE WO CONTRAST    Result Date: 4/13/2022  EXAMINATION: CT OF THE LUMBAR SPINE WITHOUT CONTRAST  4/13/2022 TECHNIQUE: CT of the lumbar spine was performed without the administration of intravenous contrast. Multiplanar reformatted images are provided for review. Adjustment of mA and/or kV according to patient size was utilized. Dose modulation, iterative reconstruction, and/or weight based adjustment of the mA/kV was utilized to reduce the radiation dose to as low as reasonably achievable. COMPARISON: CT lumbar spine March 8, 2022 HISTORY: ORDERING SYSTEM PROVIDED HISTORY: pain, no trauma TECHNOLOGIST PROVIDED HISTORY: Reason for exam:->pain, no trauma Decision Support Exception - unselect if not a suspected or confirmed emergency medical condition->Emergency Medical Condition (MA) What reading provider will be dictating this exam?->CRC FINDINGS: BONES/ALIGNMENT: Demonstrated is posterior fixation hardware seen from L3-L5 with decompressive laminectomy. No evidence of hardware complications. L1-L2: There is soft tissue edema adjacent to the L1 disc.   There is new loss of height at the L1 inferior endplate extending images of the spine were obtained. Intraprocedural fluoroscopic spot images as above. See separate procedure report for more information. Discharge Medications:      Medication List      START taking these medications    vancomycin  infusion  Commonly known as: VANCOCIN  Infuse 750 mg intravenously three times a week Compound per protocol. Notes to patient: With hemodialysis        CONTINUE taking these medications    allopurinol 100 MG tablet  Commonly known as: ZYLOPRIM  Take 1 tablet by mouth daily     atorvastatin 80 MG tablet  Commonly known as: LIPITOR     Bumex 2 MG tablet  Generic drug: bumetanide     Combigan 0.2-0.5 % ophthalmic solution  Generic drug: brimonidine-timolol     hydrALAZINE 10 MG tablet  Commonly known as: APRESOLINE  Take 1 tablet by mouth 2 times daily     insulin glargine 100 UNIT/ML injection vial  Commonly known as: LANTUS     isosorbide mononitrate 30 MG extended release tablet  Commonly known as: IMDUR     lanthanum 1000 MG chewable tablet  Commonly known as: FOSRENOL     lidocaine-prilocaine 2.5-2.5 % cream  Commonly known as: EMLA     Lumigan 0.01 % Soln ophthalmic drops  Generic drug: bimatoprost     metoprolol succinate 25 MG extended release tablet  Commonly known as: TOPROL XL  Take 1 tablet by mouth daily     midodrine 5 MG tablet  Commonly known as: PROAMATINE     omeprazole 20 MG delayed release capsule  Commonly known as: PRILOSEC     Jose Caps 1 MG Caps     ticagrelor 90 MG Tabs tablet  Commonly known as: BRILINTA  Take 1 tablet by mouth 2 times daily        ASK your doctor about these medications    gabapentin 100 MG capsule  Commonly known as: NEURONTIN  Take 2 capsules by mouth 3 times daily for 180 days.            Where to Get Your Medications      You can get these medications from any pharmacy    Bring a paper prescription for each of these medications  · vancomycin  infusion         Time Spent on discharge is more than 45 minutes in the examination, evaluation, counseling and review of medications and discharge plan.    +++++++++++++++++++++++++++++++++++++++++++++++++  GOYO Matthews - NP  [address]  +++++++++++++++++++++++++++++++++++++++++++++++++  NOTE: This report was transcribed using voice recognition software. Every effort was made to ensure accuracy; however, inadvertent computerized transcription errors may be present.

## 2022-04-22 NOTE — PROGRESS NOTES
Infectious Disease  Progress Note  [facility]    Chief Complaint: R/O  lumbar osteomyelitis    Subjective:  Reports low back pain, denies bowel or bladder incontinence. Afebrile     Scheduled Meds:   insulin lispro  0-6 Units SubCUTAneous TID WC    insulin lispro  0-3 Units SubCUTAneous Nightly    vancomycin  1,000 mg IntraVENous Once    vancomycin (VANCOCIN) intermittent dosing (placeholder)   Other RX Placeholder    atorvastatin  80 mg Oral Nightly    allopurinol  100 mg Oral Daily    b complex-C-folic acid  1 mg Oral Nightly    bumetanide  2 mg Oral Once per day on Sun Tue Thu    gabapentin  200 mg Oral TID    hydrALAZINE  10 mg Oral BID    insulin glargine-yfgn  5 Units SubCUTAneous Nightly    isosorbide mononitrate  30 mg Oral Daily    latanoprost  1 drop Right Eye Nightly    pantoprazole  40 mg Oral QAM AC    metoprolol succinate  25 mg Oral Daily    ticagrelor  90 mg Oral BID    lanthanum  1,000 mg Oral TID WC    brimonidine  1 drop Right Eye BID    And    timolol  1 drop Right Eye BID     Continuous Infusions:   dextrose       PRN Meds:sodium chloride flush, fentanNYL, lidocaine-prilocaine, midodrine, acetaminophen, melatonin, polyethylene glycol, glucose, dextrose, glucagon (rDNA), dextrose, ondansetron, oxyCODONE-acetaminophen    ROS:  As mentioned in subjective, all other systems negative      BP (!) 135/52   Pulse 58   Temp 97.4 °F (36.3 °C) (Oral)   Resp 16   Ht 5' 10\" (1.778 m)   Wt 178 lb 2.1 oz (80.8 kg)   SpO2 93%   BMI 25.56 kg/m²     Physical Exam  Constitutional: The patient is awake, alert, and oriented. Skin: Warm and dry. No jaundice. HEENT: Eyes show round, and reactive pupils. Neck: Supple to movements. No lymphadenopathy. Chest: Clear to auscultation posteriorly  Cardiovascular: S1 and S2 are rhythmic and regular. No murmurs appreciated. Abdomen: Soft nontender  Extremities: No clubbing, no cyanosis. Right UE lymphedema.   Musculoskeletal:  No edema, strength good, AV fistula ok   Tender L spine   Neurological: Alert and oriented x 3,   Lines: right upper chest port accessed    CBC with Differential:      Lab Results   Component Value Date    WBC 4.2 04/22/2022    RBC 2.89 04/22/2022    HGB 7.9 04/22/2022    HCT 26.5 04/22/2022     04/22/2022    MCV 91.7 04/22/2022    MCH 27.3 04/22/2022    MCHC 29.8 04/22/2022    RDW 17.2 04/22/2022    NRBC 0.0 02/18/2017    SEGSPCT 70 03/12/2014    BANDSPCT 1 02/18/2015    LYMPHOPCT 24.2 04/18/2022    PROMYELOPCT 1.8 02/18/2017    MONOPCT 14.0 04/18/2022    MYELOPCT 0.9 10/24/2017    BASOPCT 0.6 04/18/2022    MONOSABS 0.75 04/18/2022    LYMPHSABS 1.29 04/18/2022    EOSABS 0.18 04/18/2022    BASOSABS 0.03 04/18/2022       CMP:    Lab Results   Component Value Date     04/22/2022    K 4.1 04/22/2022    K 4.6 04/13/2022     04/22/2022    CO2 25 04/22/2022    BUN 26 04/22/2022    CREATININE 4.5 04/22/2022    GFRAA 12 04/22/2022    LABGLOM 12 04/22/2022    GLUCOSE 118 04/22/2022    GLUCOSE 46 04/02/2012    PROT 5.3 04/22/2022    LABALBU 2.5 04/22/2022    LABALBU 3.8 04/02/2012    CALCIUM 8.8 04/22/2022    BILITOT 0.4 04/22/2022    ALKPHOS 97 04/22/2022    AST 20 04/22/2022    ALT <5 04/22/2022       Hepatic Function Panel:    Lab Results   Component Value Date    ALKPHOS 97 04/22/2022    ALT <5 04/22/2022    AST 20 04/22/2022    PROT 5.3 04/22/2022    BILITOT 0.4 04/22/2022    BILIDIR <0.2 05/25/2021    IBILI see below 05/25/2021    LABALBU 2.5 04/22/2022    LABALBU 3.8 04/02/2012         Microbiology :  Blood culture - neg to date   Component 4/18/22 1154   Gram Stain Orderable Gram stain performed from swab, interpret results with   caution. Swab specimens of sterile fluids are inferior to   aspirate specimens for organism recovery. Rare Polymorphonuclear leukocytes   Epithelial cells not seen   Rare Gram positive coccobacillary organisms      anerobic culture: showed coag negative staph.  Requested lab to work up   Aerobic cx: no growth  Surgical path: did show acute/chronic osteomyelitis. Assessment:  · Lumbar spine osteomyelitis s/p bone biopsy on 4/18: Coag negative staphylococcus  · ESRD on HD:     Plan:    · continue IV vancomycin (pharmacy to dose) 750mg post HD three times a week for 6 weeks (end date June 1st 2022. Script given to case management. · Stop zosyn  · Ok to be discharged from ID stand point. · Please make sure port is de accessed before discharge. Discussed with case management. Pending auth.     Electronically signed by Jalyn Weston MD on 4/22/2022 at 1:15 PM

## 2022-04-22 NOTE — PROGRESS NOTES
Pharmacy Consultation Note  (Antibiotic Dosing and Monitoring)    Initial consult date: 4-  Consulting physician/provider: Dr. Saurav Earl  Drug: Vancomycin  Indication: Osteomyelitis (L1-L2)    Age/Gender Height Weight IBW  Allergy Information   67 y.o./female 5' 10\" (177.8 cm) 172 lb (78 kg)     Ideal body weight: 68.5 kg (151 lb 0.2 oz)  Adjusted ideal body weight: 74 kg (163 lb 3 oz)   Furosemide      Renal Function: ESRD (HD: MWF)     Vancomycin Monitoring:  Trough:  No results for input(s): VANCOTROUGH in the last 72 hours. Random:    Recent Labs     04/22/22  0439   VANCORANDOM 15.8       Vancomycin Administration Times:  Recent vancomycin administrations                   vancomycin (VANCOCIN) 750 mg in dextrose 5 % 250 mL IVPB (mg) 750 mg New Bag 04/20/22 1442              Assessment:  · 66 yo/F admitted 4/13 for back pain. Found to have L1-L2 osteo (recent epidural nerve block 1 month PTA). · ATBs held until after Bone Bx (done today, 4/18) which was delayed from 4/14/2022 pending Cards clearance. · S/P HD session this AM (4/18) and Bone Bx. · To dose vancomycin, pharmacy will be utilizing dosing based off of levels due to patient requiring hemodialysis. · 4/19: Vancomycin level at 05:10 = 15.1 mcg/mL, no HD ordered for today on MWF schedule. · 4/20: Patient in hemodialysis today, scheduled for 4 hours. WBC 4, patient afebrile in last 24 hours. · 4/21: no hemodialysis scheduled for today, WBC WNL, afebrile. · 4/22: Patient in hemodialysis today, vancomycin level pre-HD = 15.8 mcg/mL, patient afebrile for last 24 hours, WBC 4.2    Plan:  · Vancomycin 1000 mg IV x 1 following HD today  · Will check vancomycin levels when appropriate  · Will continue to monitor dialysis orders. · Clinical pharmacy to follow.     Nuzhat Bernal, PharmD   Pharmacy Resident   Phone: [phone]  4/22/2022 9:50 AM

## 2022-04-22 NOTE — FLOWSHEET NOTE
04/22/22 1011   Vital Signs   BP (!) 121/43   Temp 97.4 °F (36.3 °C)   Pulse 51   Resp 18   Weight 178 lb 2.1 oz (80.8 kg)   Weight Method Bed scale   Percent Weight Change -1.82   Post-Hemodialysis Assessment   Post-Treatment Procedures Blood returned; Access bleeding time < 10 minutes   Machine Disinfection Process Exterior Machine Disinfection   Rinseback Volume (ml) 300 ml   Total Liters Processed (l/min) 101.6 l/min   Dialyzer Clearance Lightly streaked   Duration of Treatment (minutes) 240 minutes   Heparin amount administered during treatment (units) 0 units   Hemodialysis Intake (ml) 300 ml   Hemodialysis Output (ml) 1800 ml   NET Removed (ml) 1500 ml   Tolerated Treatment Good   Bilateral Breath Sounds Diminished   Edema Right upper extremity   Patient Disposition Return to room

## 2022-04-23 LAB
ANAEROBIC CULTURE: ABNORMAL
ANAEROBIC CULTURE: ABNORMAL
ORGANISM: ABNORMAL

## 2022-05-02 ENCOUNTER — HOSPITAL ENCOUNTER (INPATIENT)
Age: 67
LOS: 9 days | Discharge: NURSING, REHAB OR LONG-TERM CARE | DRG: 250 | End: 2022-05-11
Attending: EMERGENCY MEDICINE | Admitting: INTERNAL MEDICINE
Payer: COMMERCIAL

## 2022-05-02 ENCOUNTER — HOSPITAL ENCOUNTER (EMERGENCY)
Age: 67
Discharge: ANOTHER HOSPITAL | End: 2022-05-02
Attending: STUDENT IN AN ORGANIZED HEALTH CARE EDUCATION/TRAINING PROGRAM
Payer: COMMERCIAL

## 2022-05-02 ENCOUNTER — APPOINTMENT (OUTPATIENT)
Dept: GENERAL RADIOLOGY | Age: 67
End: 2022-05-02
Payer: COMMERCIAL

## 2022-05-02 ENCOUNTER — APPOINTMENT (OUTPATIENT)
Dept: CT IMAGING | Age: 67
End: 2022-05-02
Payer: COMMERCIAL

## 2022-05-02 VITALS
DIASTOLIC BLOOD PRESSURE: 39 MMHG | SYSTOLIC BLOOD PRESSURE: 107 MMHG | TEMPERATURE: 98.2 F | HEIGHT: 70 IN | WEIGHT: 163 LBS | OXYGEN SATURATION: 97 % | HEART RATE: 99 BPM | RESPIRATION RATE: 14 BRPM | BODY MASS INDEX: 23.34 KG/M2

## 2022-05-02 DIAGNOSIS — M54.59 INTRACTABLE LOW BACK PAIN: ICD-10-CM

## 2022-05-02 DIAGNOSIS — I21.4 NSTEMI (NON-ST ELEVATED MYOCARDIAL INFARCTION) (HCC): Primary | ICD-10-CM

## 2022-05-02 DIAGNOSIS — R77.8 ELEVATED TROPONIN: ICD-10-CM

## 2022-05-02 DIAGNOSIS — M46.20 VERTEBRAL OSTEOMYELITIS (HCC): ICD-10-CM

## 2022-05-02 DIAGNOSIS — M48.061 LUMBAR FORAMINAL STENOSIS: ICD-10-CM

## 2022-05-02 LAB
ANION GAP SERPL CALCULATED.3IONS-SCNC: 11 MMOL/L (ref 7–16)
APTT: 42.9 SEC (ref 24.5–35.1)
BASOPHILS ABSOLUTE: 0.03 E9/L (ref 0–0.2)
BASOPHILS RELATIVE PERCENT: 0.5 % (ref 0–2)
BUN BLDV-MCNC: 9 MG/DL (ref 6–23)
CALCIUM SERPL-MCNC: 8.8 MG/DL (ref 8.6–10.2)
CHLORIDE BLD-SCNC: 100 MMOL/L (ref 98–107)
CO2: 26 MMOL/L (ref 22–29)
CREAT SERPL-MCNC: 2.5 MG/DL (ref 0.5–1)
CULTURE SURGICAL: NORMAL
EOSINOPHILS ABSOLUTE: 0.06 E9/L (ref 0.05–0.5)
EOSINOPHILS RELATIVE PERCENT: 1 % (ref 0–6)
GFR AFRICAN AMERICAN: 23
GFR NON-AFRICAN AMERICAN: 23 ML/MIN/1.73
GLUCOSE BLD-MCNC: 92 MG/DL (ref 74–99)
HCT VFR BLD CALC: 30.9 % (ref 34–48)
HEMOGLOBIN: 9.1 G/DL (ref 11.5–15.5)
IMMATURE GRANULOCYTES #: 0.03 E9/L
IMMATURE GRANULOCYTES %: 0.5 % (ref 0–5)
LYMPHOCYTES ABSOLUTE: 0.95 E9/L (ref 1.5–4)
LYMPHOCYTES RELATIVE PERCENT: 15.7 % (ref 20–42)
MCH RBC QN AUTO: 27.6 PG (ref 26–35)
MCHC RBC AUTO-ENTMCNC: 29.4 % (ref 32–34.5)
MCV RBC AUTO: 93.6 FL (ref 80–99.9)
MONOCYTES ABSOLUTE: 0.55 E9/L (ref 0.1–0.95)
MONOCYTES RELATIVE PERCENT: 9.1 % (ref 2–12)
NEUTROPHILS ABSOLUTE: 4.43 E9/L (ref 1.8–7.3)
NEUTROPHILS RELATIVE PERCENT: 73.2 % (ref 43–80)
PDW BLD-RTO: 18.3 FL (ref 11.5–15)
PLATELET # BLD: 207 E9/L (ref 130–450)
PMV BLD AUTO: 10.6 FL (ref 7–12)
POTASSIUM REFLEX MAGNESIUM: 3.6 MMOL/L (ref 3.5–5)
RBC # BLD: 3.3 E12/L (ref 3.5–5.5)
REASON FOR REJECTION: NORMAL
REJECTED TEST: NORMAL
SODIUM BLD-SCNC: 137 MMOL/L (ref 132–146)
TROPONIN, HIGH SENSITIVITY: 323 NG/L (ref 0–9)
TROPONIN, HIGH SENSITIVITY: 535 NG/L (ref 0–9)
WBC # BLD: 6.1 E9/L (ref 4.5–11.5)

## 2022-05-02 PROCEDURE — 99285 EMERGENCY DEPT VISIT HI MDM: CPT

## 2022-05-02 PROCEDURE — 6360000002 HC RX W HCPCS: Performed by: STUDENT IN AN ORGANIZED HEALTH CARE EDUCATION/TRAINING PROGRAM

## 2022-05-02 PROCEDURE — 6370000000 HC RX 637 (ALT 250 FOR IP): Performed by: STUDENT IN AN ORGANIZED HEALTH CARE EDUCATION/TRAINING PROGRAM

## 2022-05-02 PROCEDURE — 6360000004 HC RX CONTRAST MEDICATION: Performed by: RADIOLOGY

## 2022-05-02 PROCEDURE — 71045 X-RAY EXAM CHEST 1 VIEW: CPT

## 2022-05-02 PROCEDURE — 96376 TX/PRO/DX INJ SAME DRUG ADON: CPT

## 2022-05-02 PROCEDURE — 2140000000 HC CCU INTERMEDIATE R&B

## 2022-05-02 PROCEDURE — 72131 CT LUMBAR SPINE W/O DYE: CPT

## 2022-05-02 PROCEDURE — 80048 BASIC METABOLIC PNL TOTAL CA: CPT

## 2022-05-02 PROCEDURE — 74176 CT ABD & PELVIS W/O CONTRAST: CPT

## 2022-05-02 PROCEDURE — 96365 THER/PROPH/DIAG IV INF INIT: CPT

## 2022-05-02 PROCEDURE — 71275 CT ANGIOGRAPHY CHEST: CPT

## 2022-05-02 PROCEDURE — 85730 THROMBOPLASTIN TIME PARTIAL: CPT

## 2022-05-02 PROCEDURE — 85025 COMPLETE CBC W/AUTO DIFF WBC: CPT

## 2022-05-02 PROCEDURE — 84484 ASSAY OF TROPONIN QUANT: CPT

## 2022-05-02 PROCEDURE — 93005 ELECTROCARDIOGRAM TRACING: CPT | Performed by: STUDENT IN AN ORGANIZED HEALTH CARE EDUCATION/TRAINING PROGRAM

## 2022-05-02 PROCEDURE — 36415 COLL VENOUS BLD VENIPUNCTURE: CPT

## 2022-05-02 RX ORDER — HEPARIN SODIUM 1000 [USP'U]/ML
2000 INJECTION, SOLUTION INTRAVENOUS; SUBCUTANEOUS PRN
Status: DISCONTINUED | OUTPATIENT
Start: 2022-05-02 | End: 2022-05-02 | Stop reason: HOSPADM

## 2022-05-02 RX ORDER — ASPIRIN 81 MG/1
324 TABLET, CHEWABLE ORAL ONCE
Status: COMPLETED | OUTPATIENT
Start: 2022-05-02 | End: 2022-05-02

## 2022-05-02 RX ORDER — HEPARIN SODIUM 10000 [USP'U]/100ML
5-30 INJECTION, SOLUTION INTRAVENOUS CONTINUOUS
Status: DISCONTINUED | OUTPATIENT
Start: 2022-05-02 | End: 2022-05-02 | Stop reason: HOSPADM

## 2022-05-02 RX ORDER — HEPARIN SODIUM 1000 [USP'U]/ML
2000 INJECTION, SOLUTION INTRAVENOUS; SUBCUTANEOUS PRN
Status: DISCONTINUED | OUTPATIENT
Start: 2022-05-02 | End: 2022-05-05

## 2022-05-02 RX ORDER — HEPARIN SODIUM 1000 [USP'U]/ML
4000 INJECTION, SOLUTION INTRAVENOUS; SUBCUTANEOUS PRN
Status: DISCONTINUED | OUTPATIENT
Start: 2022-05-02 | End: 2022-05-05

## 2022-05-02 RX ORDER — HEPARIN SODIUM 1000 [USP'U]/ML
4000 INJECTION, SOLUTION INTRAVENOUS; SUBCUTANEOUS PRN
Status: DISCONTINUED | OUTPATIENT
Start: 2022-05-02 | End: 2022-05-02 | Stop reason: HOSPADM

## 2022-05-02 RX ORDER — HEPARIN SODIUM 1000 [USP'U]/ML
4000 INJECTION, SOLUTION INTRAVENOUS; SUBCUTANEOUS ONCE
Status: COMPLETED | OUTPATIENT
Start: 2022-05-02 | End: 2022-05-02

## 2022-05-02 RX ORDER — HEPARIN SODIUM 10000 [USP'U]/100ML
5-30 INJECTION, SOLUTION INTRAVENOUS CONTINUOUS
Status: DISCONTINUED | OUTPATIENT
Start: 2022-05-02 | End: 2022-05-05

## 2022-05-02 RX ADMIN — HEPARIN SODIUM 4000 UNITS: 1000 INJECTION INTRAVENOUS; SUBCUTANEOUS at 20:22

## 2022-05-02 RX ADMIN — Medication 12 UNITS/KG/HR: at 21:36

## 2022-05-02 RX ADMIN — IOPAMIDOL 75 ML: 755 INJECTION, SOLUTION INTRAVENOUS at 19:42

## 2022-05-02 RX ADMIN — ASPIRIN 324 MG: 81 TABLET, CHEWABLE ORAL at 19:50

## 2022-05-02 ASSESSMENT — ENCOUNTER SYMPTOMS
WHEEZING: 0
DIARRHEA: 0
EYE REDNESS: 0
BACK PAIN: 1
SORE THROAT: 0
EYE PAIN: 0
ABDOMINAL DISTENTION: 0
NAUSEA: 0
SINUS PRESSURE: 0
SHORTNESS OF BREATH: 0
ABDOMINAL PAIN: 1
EYE DISCHARGE: 0
VOMITING: 0
COUGH: 0

## 2022-05-02 ASSESSMENT — PAIN DESCRIPTION - LOCATION: LOCATION: BACK

## 2022-05-02 ASSESSMENT — PAIN - FUNCTIONAL ASSESSMENT
PAIN_FUNCTIONAL_ASSESSMENT: 0-10
PAIN_FUNCTIONAL_ASSESSMENT: ACTIVITIES ARE NOT PREVENTED

## 2022-05-02 ASSESSMENT — PAIN DESCRIPTION - DESCRIPTORS: DESCRIPTORS: SORE;SHARP;ACHING

## 2022-05-02 ASSESSMENT — PAIN SCALES - GENERAL: PAINLEVEL_OUTOF10: 7

## 2022-05-02 ASSESSMENT — PAIN DESCRIPTION - PAIN TYPE: TYPE: ACUTE PAIN

## 2022-05-02 NOTE — PROGRESS NOTES
@4291 access center notified of transfer to [facility]/ monitored  NSTEMI  Dr. Cami Antonio spoke with Dr. Fina Antony cardiology-  Dr. Cami Antonio also notified Dr. Fernando Beckett  @4341 Dr. Cami Antonio spoke with Dr. Michelle Ahuja accepting physician  @2483 access center reported Sailaja Barker is discharge dependent for bed assignments  @2003 access center notified to tranfer pt ED to ED  Dr. Mercedes Boykin spoke with Dr. Zac Diaz accepting ED physician  @4979 access center reported [place] ETA 3 hrs @ [phone]

## 2022-05-02 NOTE — LETTER
[place] Department Medicaid  CERTIFICATION OF NECESSITY  FOR NON-EMERGENCY TRANSPORTATION   BY GROUND AMBULANCE      Individual Information   1. Name: Enid Jessica 2. [place] Medicaid Billing Number:    3. Address: [address] [place] [address]      Transportation Provider Information   4. Provider Name:    5. [place] Medicaid Provider Number:  National Provider Identifier (NPI):      Certification  7. Criteria:  During transport, this individual requires:  [x] Medical treatment or continuous     supervision by an EMT. [] The administration or regulation of oxygen by another person. [] Supervised protective restraint. 8. Period Beginning Date: 22   9. Length   [x] Not more than 1 day(s)  [] One Year     Additional Information Relevant to Certification   10. Comments or Explanations, If Necessary or Appropriate   NSTEMI, severe back and RLE pain and weakness, unable to tolerate sitting up     Certifying Practitioner Information   11. Name of Practitioner: Lauryn Stokes MD   12. [place] Medicaid Provider Number, If Applicable:  [address] Provider Identifier (NPI):      Signature Information   14. Date of Signature: 22 15. Name of Person Signing: Tommy Thomas   12. Signature and Professional Designation: Electronically signed by YRN Linn on 2022 at 1:49 PM     OD [address]  Rev. 2015        [facility] Encounter Date/Time: 2022    Hospital Account: [de-identified]    MRN: [id number]    Patient: Enid Jessica    Contact Serial #: [id number]      ENCOUNTER          Patient Class: I Private Enc?   No Unit [facility] 6406/6406-B   Hospital Service: MED   Encounter DX: NSTEMI (non-ST elevated *   ADM Provider: Destiney Heck DO   Procedure:     ATT Provider: Lauryn Stokes MD   REF Provider:        Admission DX: NSTEMI (non-ST elevated myocardial infarction) ([facility] [address] and DX codes: I21.4      PATIENT                 Name: Malika Kinney : 1955 (67 yrs)   Address: [place] [address],  [address] Sex: Female   City: [address] [place] [address]         Marital Status: Single   Employer: DISABLED         Protestant: [employer]   Primary Care Provider: Mickey Castanon MD         Primary Phone: [phone]   EMERGENCY CONTACT   Contact Name Legal Guardian? Relationship to Patient Home Phone Work Phone   1. Pita Gaines  2. Carmenza Mullins      Other  Brother/Sister [phone]                 GUARANTOR            Guarantor: Malini Salinas     : 1955   Address: [address], [facility]* Sex: [place]     [place] [address]     Relation to Patient: Self       Home Phone: [phone]   Guarantor ID: [id number]       Work Phone:     Guarantor Employer: DISABLED         Status: DISABLED      COVERAGE        PRIMARY INSURANCE   Payor: [address] [place] [address] 62*   Payor Address: [facility], [address] [facility] [facility]       Group Number: [id number] Type: INDEMNITY   Subscriber Name: Christen Ramon : 1955   Subscriber ID: [id number] Pat. Rel. to Sub: Self   SECONDARY INSURANCE   Payor:   Plan:     Payor Address:  ,           Group Number:   Insurance Type:     Subscriber Name:   Subscriber :     Subscriber ID:   Pat.  Rel. to Sub:             CSN: [id number]

## 2022-05-02 NOTE — ED PROVIDER NOTES
The history is provided by the patient and medical records. 52-year-old female presents emerged department complaint of back pain abdominal pain. Been going on for several weeks. Worsening over the past 10 days. She was recently admitted in [facility] previously due to this with concern of possible osteomyelitis of L1 and L2. She was discharged home previously. Surgical cultures negative for any signs of growth. Patient still having pain at this time. Nothing makes it better or worse she does not walk. She did have a pain pump put in her back for chronic pain previously however it has not been functional for a while. She otherwise denies any falls or trauma to the area denies any other acute complaints this time. Denies any fever chills cough congestion runny nose. Denies any chest pain shortness of breath changes in bowel or bladder habits. She is a chronic kidney disease on dialysis did get full treatment of dialysis today. The patient presents with and / back pain that has been going on for weeks. These symptoms are moderate in severity. Symptoms are made better by nothing. Symptoms are made worse by nothing. Associated symptoms include none. Review of Systems   Constitutional: Negative for chills and fever. HENT: Negative for ear pain, sinus pressure and sore throat. Eyes: Negative for pain, discharge and redness. Respiratory: Negative for cough, shortness of breath and wheezing. Cardiovascular: Negative for chest pain. Gastrointestinal: Positive for abdominal pain. Negative for abdominal distention, diarrhea, nausea and vomiting. Genitourinary: Negative for dysuria and frequency. Musculoskeletal: Positive for back pain. Negative for arthralgias. Skin: Negative for rash and wound. Neurological: Negative for weakness and headaches. Hematological: Negative for adenopathy. All other systems reviewed and are negative.        Physical Exam  Vitals and nursing note reviewed. Constitutional:       Appearance: Normal appearance. She is normal weight. HENT:      Head: Normocephalic and atraumatic. Eyes:      Conjunctiva/sclera: Conjunctivae normal.   Cardiovascular:      Rate and Rhythm: Normal rate and regular rhythm. Pulses: Normal pulses. Heart sounds: Normal heart sounds. No murmur heard. No gallop. Pulmonary:      Effort: Pulmonary effort is normal. No respiratory distress. Breath sounds: Normal breath sounds. No wheezing or rales. Abdominal:      General: Abdomen is flat. Bowel sounds are normal. There is no distension. Palpations: Abdomen is soft. Tenderness: There is no abdominal tenderness. There is no guarding. Comments: Right flank pain noted, does have some slight tenderness along the paraspinal muscles of the back as well. Does have back pain pump noted as well   Musculoskeletal:         General: No swelling, tenderness or deformity. Skin:     General: Skin is warm and dry. Capillary Refill: Capillary refill takes less than 2 seconds. Coloration: Skin is not jaundiced. Neurological:      General: No focal deficit present. Mental Status: She is alert and oriented to person, place, and time. Psychiatric:         Mood and Affect: Mood normal.         Thought Content: Thought content normal.          Procedures     MDM     58-year-old female presents emerged department complaint of back pain and chest pain. Patient noted to have recent admission at [facility] for vertebral osteomyelitis of her lumbar spine. Is on outpatient antibiotics. Patient's lab work showing concern for NSTEMI with significant elevation in troponin as well as concern of significant EKG changes. Did speak with interventional cardiology they advised no acute STEMI on the EKG however patient will need plan for PCI in the morning. Due to this patient will need transferred.   Did set up transfer however there are no open beds at [facility] and are discharged dependent. Due to this patient will need to be transferred to ER to ER. Did also speak with Dr. Liseth Marie patient's neurosurgeon about the patient's lumbar spine findings of continued signs of osteomyelitis. He will provide consult due to this. Patient otherwise remained he medically stable here. She is safe to be transferred at this time due to her NSTEMI was started on heparin as well as given aspirin. She is agreeable this plan. ED Course as of 05/02/22 2026   Mon May 02, 2022   1919 Spoke with Dr. Kaylyn Solis of cardiology he advised EKG does not meet STEMI criteria at this time. Patient will need transferred down to [facility] and will need to be started on heparin for possible PCI tomorrow. [CB]   1923 Spoke with Dr. Liseth Marie he is aware the patient and will see her when she gets to [facility] [CB]   1926 EKG: This EKG is signed by emergency department physician. Rate: 97  Rhythm: Atrial fibrillation  Interpretation: ST elevation noted in leads aVR ST depressions noted in leads V4 through V6 as well as in leads I to, ST elevation in leads V1. Comparison: changes compared to previous EKG      [CB]   1940 Consulted with Dr. Fred Darnell, hospitalist, who accepted for admission. [KG]   2008 Spoke with Dr. [place] Elise, ED physician, who accepted for transfer. [KG]      ED Course User Index  [CB] Joao Oreilly MD  [KG] Aneta Or, DO        ED Course as of 05/02/22 2026   Mon May 02, 2022   1919 Spoke with Dr. Kaylyn Solis of cardiology he advised EKG does not meet STEMI criteria at this time. Patient will need transferred down to [facility] and will need to be started on heparin for possible PCI tomorrow. [CB]   1923 Spoke with Dr. Liseth Marie he is aware the patient and will see her when she gets to [facility] [CB]   1926 EKG:   This EKG is signed by emergency department physician. Rate: 97  Rhythm: Atrial fibrillation  Interpretation: ST elevation noted in leads aVR ST depressions noted in leads V4 through V6 as well as in leads I to, ST elevation in leads V1. Comparison: changes compared to previous EKG      [CB]   1940 Consulted with Dr. Linda Silva, hospitalist, who accepted for admission. [KG]   2008 Spoke with Dr. Maebelle Aase, ED physician, who accepted for transfer. [KG]      ED Course User Index  [CB] Matthias Greenberg MD  [KG] Karen Rom, DO       --------------------------------------------- PAST HISTORY ---------------------------------------------  Past Medical History:  has a past medical history of Acute infection of bone ([address] Acute osteomyelitis of phalanx of left hand ([address] Anemia of chronic disease, Arthritis, Breast cancer ([address] CAD (coronary artery disease), Carpal tunnel syndrome, Chronic diastolic CHF (congestive heart failure) ([address] CKD (chronic kidney disease) stage 4, GFR 15-29 ml/min ([address] Diabetic retinopathy ([address] Glaucoma, Hemodialysis patient ([address] Hyperkalemia, diminished renal excretion, Hyperlipidemia, Hypertension, Hypoglycemia unawareness in type 1 diabetes mellitus ([address] Insulin dependent type 2 diabetes mellitus ([address] Neuropathy, Osteomyelitis due to secondary diabetes [facility]), Patient is Taoism, Refusal of blood product, Ventricular hypertrophy, and Vitreous hemorrhage ([address] Past Surgical History:  has a past surgical history that includes Cholecystectomy; amputation; Mastectomy; Cystoscopy; Cataract removal; Ankle surgery; other surgical history (9/27/2011); ECHO Compl W Dop Color Flow (2/14/2013); Echo Complete (9/17/2013); spinal cord decompression; other surgical history (insertion lumbar drain insertion); other surgical history (10/22/15); other surgical history (11/03/2015); Tunneled venous catheter placement (11/15/2017);  Dialysis fistula creation (Left, 01/31/2018); vitrectomy (Left, 04/10/2018); pr rpr retinal dtchmnt w/vitrectomy any meth (Left, 4/10/2018); pr av anast,up arm basilic vein transposit (Left, 5/15/2018); pr ligatn angioaccess av fistula (Left, 9/25/2018); Colonoscopy; Carpal tunnel release (Left, 3/17/2020); transesophageal echocardiogram (11/11/2021); Cardiac catheterization (12/09/2021); Finger amputation (Left, 1/20/2022); bone biopsy (N/A, 4/18/2022); and transesophageal echocardiogram (04/19/2022). Social History:  reports that she has never smoked. She has never used smokeless tobacco. She reports that she does not drink alcohol and does not use drugs. Family History: family history includes Breast Cancer (age of onset: 61) in her maternal grandmother and mother; Heart Disease in her father; Hypertension in her mother; Prostate Cancer in her father. The patients home medications have been reviewed.     Allergies: Furosemide    -------------------------------------------------- RESULTS -------------------------------------------------    Lab  Results for orders placed or performed during the hospital encounter of 05/02/22   CBC with Auto Differential   Result Value Ref Range    WBC 6.1 4.5 - 11.5 E9/L    RBC 3.30 (L) 3.50 - 5.50 E12/L    Hemoglobin 9.1 (L) 11.5 - 15.5 g/dL    Hematocrit 30.9 (L) 34.0 - 48.0 %    MCV 93.6 80.0 - 99.9 fL    MCH 27.6 26.0 - 35.0 pg    MCHC 29.4 (L) 32.0 - 34.5 %    RDW 18.3 (H) 11.5 - 15.0 fL    Platelets 952 223 - 097 E9/L    MPV 10.6 7.0 - 12.0 fL    Neutrophils % 73.2 43.0 - 80.0 %    Immature Granulocytes % 0.5 0.0 - 5.0 %    Lymphocytes % 15.7 (L) 20.0 - 42.0 %    Monocytes % 9.1 2.0 - 12.0 %    Eosinophils % 1.0 0.0 - 6.0 %    Basophils % 0.5 0.0 - 2.0 %    Neutrophils Absolute 4.43 1.80 - 7.30 E9/L    Immature Granulocytes # 0.03 E9/L    Lymphocytes Absolute 0.95 (L) 1.50 - 4.00 E9/L    Monocytes Absolute 0.55 0.10 - 0.95 E9/L    Eosinophils Absolute 0.06 0.05 - 0.50 E9/L    Basophils Absolute 0.03 0.00 - 0.20 E9/L   Basic Metabolic Panel w/ Reflex to MG   Result Value Ref Range    Sodium 137 132 - 146 mmol/L    Potassium reflex Magnesium 3.6 3.5 - 5.0 mmol/L    Chloride 100 98 - 107 mmol/L    CO2 26 22 - 29 mmol/L    Anion Gap 11 7 - 16 mmol/L    Glucose 92 74 - 99 mg/dL    BUN 9 6 - 23 mg/dL    CREATININE 2.5 (H) 0.5 - 1.0 mg/dL    GFR Non-African American 23 >=60 mL/min/1.73    GFR African American 23     Calcium 8.8 8.6 - 10.2 mg/dL   Troponin   Result Value Ref Range    Troponin, High Sensitivity 323 (H) 0 - 9 ng/L   EKG 12 Lead   Result Value Ref Range    Ventricular Rate 97 BPM    Atrial Rate 91 BPM    QRS Duration 102 ms    Q-T Interval 354 ms    QTc Calculation (Bazett) 449 ms    R Axis 13 degrees    T Axis -147 degrees       Radiology  CT LUMBAR SPINE WO CONTRAST   Final Result   Again seen are severe erosive changes at the L1-2 and L2-3 endplates, highly   suggestive discitis-osteomyelitis. Associated spondylolisthesis and inflammatory change again results in severe   spinal stenosis at L2-3 and moderate spinal stenosis at L1-2. Further evaluation with contrast enhanced MRI of the lumbar spine is   recommended. XR CHEST PORTABLE   Final Result   Interval resolution of suspected cardiogenic edema. Cardiomegaly. See above. CT ABDOMEN PELVIS WO CONTRAST Additional Contrast? None   Final Result   1. Erosive changes along the inferior endplate of the L1 vertebral body,   which has developed in the interim since the previous CT from 11/07/2017. It   may be degenerative, related to spondyloarthropathy of dialysis (related to   accumulation of amyloid) or discitis/osteomyelitis. Consider further   characterization with MRI. 2. Chronic destructive changes along the endplates at N3-1 with progressive   loss of height in the L2 vertebral body. 3. Severe atherosclerosis, within appearance suggestive of Monckeberg medial   calcific sclerosis, as may be seen with diabetes and end-stage renal disease. 4. Moderate fecal retention in the colon. No bowel wall thickening or   evidence of obstruction. 5. Stable adrenal nodules, likely adenomas. RECOMMENDATIONS:   Unavailable         CTA PULMONARY W CONTRAST    (Results Pending)         ------------------------- NURSING NOTES AND VITALS REVIEWED ---------------------------  Date / Time Roomed:  5/2/2022  4:43 PM  ED Bed Assignment:  [id number]    The nursing notes within the ED encounter and vital signs as below have been reviewed. Patient Vitals for the past 24 hrs:   BP Temp Temp src Pulse Resp SpO2 Height Weight   05/02/22 2019 (!) 107/39 -- -- 99 14 97 % -- --   05/02/22 1701 -- -- -- 103 -- -- -- --   05/02/22 1656 (!) 97/51 98.2 °F (36.8 °C) Oral -- 16 -- 5' 10\" (1.778 m) 163 lb (73.9 kg)       Oxygen Saturation Interpretation: Normal      ------------------------------------------ PROGRESS NOTES ------------------------------------------  Re-evaluation(s):  Time: 1931. Patients symptoms show no change  Repeat physical examination is not changed    Time: 1822. Patients symptoms show no change  Repeat physical examination is not changed    I have spoken with the patient and discussed todays results, in addition to providing specific details for the plan of care and counseling regarding the diagnosis and prognosis. Their questions are answered at this time and they are agreeable with the plan. I have discussed the risks and benefits of transfer and they wish to proceed with the transfer. --------------------------------- ADDITIONAL PROVIDER NOTES ---------------------------------  Consultations:  Spoke with Dr. Efrain Su (em). Discussed case. They are agreeable with the ER to ER transfer to their hospital.. Spoke with Dr. Richard Coronado (Cardiology). Discussed case. They stated patient will need to have possible PCI in the morning due to patient's NSTEMI and EKG findings. .    Reason for transfer: nstemi and need for higher level care.     This patient's ED course included: a personal history and physicial examination, re-evaluation prior to disposition, multiple bedside re-evaluations, IV medications, cardiac monitoring, continuous pulse oximetry and complex medical decision making and emergency management    This patient has remained hemodynamically stable during their ED course. Please note that the withdrawal or failure to initiate urgent interventions for this patient would likely result in a life threatening deterioration or permanent disability. Accordingly this patient received 44 minutes of critical care time, excluding separately billable procedures. Clinical Impression  1. NSTEMI (non-ST elevated myocardial infarction) ([facility])    2. Elevated troponin    3. Vertebral osteomyelitis ([facility]          Disposition  Patient's disposition: Transfer to [facility]. Transferred by: Damir Gillespie. Patient's condition is stable.        Joao Oreilly MD  Resident  05/02/22 1742

## 2022-05-03 PROBLEM — E44.0 MODERATE PROTEIN-CALORIE MALNUTRITION (HCC): Chronic | Status: ACTIVE | Noted: 2022-05-03

## 2022-05-03 LAB
ANION GAP SERPL CALCULATED.3IONS-SCNC: 14 MMOL/L (ref 7–16)
APTT: 108.7 SEC (ref 24.5–35.1)
APTT: 52.1 SEC (ref 24.5–35.1)
BUN BLDV-MCNC: 14 MG/DL (ref 6–23)
CALCIUM SERPL-MCNC: 8.9 MG/DL (ref 8.6–10.2)
CHLORIDE BLD-SCNC: 99 MMOL/L (ref 98–107)
CO2: 24 MMOL/L (ref 22–29)
CREAT SERPL-MCNC: 3.5 MG/DL (ref 0.5–1)
EKG ATRIAL RATE: 66 BPM
EKG ATRIAL RATE: 91 BPM
EKG P AXIS: 41 DEGREES
EKG P-R INTERVAL: 194 MS
EKG Q-T INTERVAL: 354 MS
EKG Q-T INTERVAL: 438 MS
EKG QRS DURATION: 102 MS
EKG QRS DURATION: 102 MS
EKG QTC CALCULATION (BAZETT): 449 MS
EKG QTC CALCULATION (BAZETT): 459 MS
EKG R AXIS: 13 DEGREES
EKG R AXIS: 13 DEGREES
EKG T AXIS: -125 DEGREES
EKG T AXIS: -147 DEGREES
EKG VENTRICULAR RATE: 66 BPM
EKG VENTRICULAR RATE: 97 BPM
GFR AFRICAN AMERICAN: 16
GFR NON-AFRICAN AMERICAN: 16 ML/MIN/1.73
GLUCOSE BLD-MCNC: 59 MG/DL (ref 74–99)
HCT VFR BLD CALC: 28.4 % (ref 34–48)
HEMOGLOBIN: 8.3 G/DL (ref 11.5–15.5)
MAGNESIUM: 2.4 MG/DL (ref 1.6–2.6)
MCH RBC QN AUTO: 26.8 PG (ref 26–35)
MCHC RBC AUTO-ENTMCNC: 29.2 % (ref 32–34.5)
MCV RBC AUTO: 91.6 FL (ref 80–99.9)
METER GLUCOSE: 105 MG/DL (ref 74–99)
METER GLUCOSE: 116 MG/DL (ref 74–99)
METER GLUCOSE: 62 MG/DL (ref 74–99)
PDW BLD-RTO: 18.3 FL (ref 11.5–15)
PLATELET # BLD: 238 E9/L (ref 130–450)
PMV BLD AUTO: 10.5 FL (ref 7–12)
POTASSIUM SERPL-SCNC: 3.8 MMOL/L (ref 3.5–5)
RBC # BLD: 3.1 E12/L (ref 3.5–5.5)
SODIUM BLD-SCNC: 137 MMOL/L (ref 132–146)
TROPONIN, HIGH SENSITIVITY: 1388 NG/L (ref 0–9)
VANCOMYCIN RANDOM: 22.9 MCG/ML (ref 5–40)
WBC # BLD: 5.4 E9/L (ref 4.5–11.5)

## 2022-05-03 PROCEDURE — 6370000000 HC RX 637 (ALT 250 FOR IP): Performed by: INTERNAL MEDICINE

## 2022-05-03 PROCEDURE — 80048 BASIC METABOLIC PNL TOTAL CA: CPT

## 2022-05-03 PROCEDURE — S5553 INSULIN LONG ACTING 5 U: HCPCS | Performed by: INTERNAL MEDICINE

## 2022-05-03 PROCEDURE — 85027 COMPLETE CBC AUTOMATED: CPT

## 2022-05-03 PROCEDURE — 80202 ASSAY OF VANCOMYCIN: CPT

## 2022-05-03 PROCEDURE — 84484 ASSAY OF TROPONIN QUANT: CPT

## 2022-05-03 PROCEDURE — 93005 ELECTROCARDIOGRAM TRACING: CPT | Performed by: INTERNAL MEDICINE

## 2022-05-03 PROCEDURE — 6360000002 HC RX W HCPCS: Performed by: INTERNAL MEDICINE

## 2022-05-03 PROCEDURE — 2500000003 HC RX 250 WO HCPCS: Performed by: INTERNAL MEDICINE

## 2022-05-03 PROCEDURE — 99223 1ST HOSP IP/OBS HIGH 75: CPT | Performed by: INTERNAL MEDICINE

## 2022-05-03 PROCEDURE — 93010 ELECTROCARDIOGRAM REPORT: CPT | Performed by: INTERNAL MEDICINE

## 2022-05-03 PROCEDURE — 36415 COLL VENOUS BLD VENIPUNCTURE: CPT

## 2022-05-03 PROCEDURE — 2580000003 HC RX 258: Performed by: INTERNAL MEDICINE

## 2022-05-03 PROCEDURE — 83735 ASSAY OF MAGNESIUM: CPT

## 2022-05-03 PROCEDURE — 85730 THROMBOPLASTIN TIME PARTIAL: CPT

## 2022-05-03 PROCEDURE — APPSS60 APP SPLIT SHARED TIME 46-60 MINUTES: Performed by: PHYSICIAN ASSISTANT

## 2022-05-03 PROCEDURE — 2140000000 HC CCU INTERMEDIATE R&B

## 2022-05-03 PROCEDURE — 82962 GLUCOSE BLOOD TEST: CPT

## 2022-05-03 RX ORDER — SODIUM CHLORIDE 9 MG/ML
INJECTION, SOLUTION INTRAVENOUS PRN
Status: DISCONTINUED | OUTPATIENT
Start: 2022-05-03 | End: 2022-05-12 | Stop reason: HOSPADM

## 2022-05-03 RX ORDER — TIMOLOL MALEATE 5 MG/ML
1 SOLUTION/ DROPS OPHTHALMIC 2 TIMES DAILY
Status: DISCONTINUED | OUTPATIENT
Start: 2022-05-03 | End: 2022-05-12 | Stop reason: HOSPADM

## 2022-05-03 RX ORDER — INSULIN GLARGINE-YFGN 100 [IU]/ML
5 INJECTION, SOLUTION SUBCUTANEOUS NIGHTLY
Status: DISCONTINUED | OUTPATIENT
Start: 2022-05-03 | End: 2022-05-12 | Stop reason: HOSPADM

## 2022-05-03 RX ORDER — ONDANSETRON 4 MG/1
4 TABLET, ORALLY DISINTEGRATING ORAL EVERY 8 HOURS PRN
Status: DISCONTINUED | OUTPATIENT
Start: 2022-05-03 | End: 2022-05-12 | Stop reason: HOSPADM

## 2022-05-03 RX ORDER — ALLOPURINOL 100 MG/1
100 TABLET ORAL DAILY
Status: DISCONTINUED | OUTPATIENT
Start: 2022-05-03 | End: 2022-05-12 | Stop reason: HOSPADM

## 2022-05-03 RX ORDER — BUMETANIDE 1 MG/1
2 TABLET ORAL
Status: DISCONTINUED | OUTPATIENT
Start: 2022-05-03 | End: 2022-05-12 | Stop reason: HOSPADM

## 2022-05-03 RX ORDER — FENTANYL CITRATE 50 UG/ML
25 INJECTION, SOLUTION INTRAMUSCULAR; INTRAVENOUS
Status: DISCONTINUED | OUTPATIENT
Start: 2022-05-03 | End: 2022-05-12 | Stop reason: HOSPADM

## 2022-05-03 RX ORDER — NICOTINE POLACRILEX 4 MG
15 LOZENGE BUCCAL PRN
Status: DISCONTINUED | OUTPATIENT
Start: 2022-05-03 | End: 2022-05-12 | Stop reason: HOSPADM

## 2022-05-03 RX ORDER — ASPIRIN 81 MG/1
81 TABLET ORAL DAILY
Status: DISCONTINUED | OUTPATIENT
Start: 2022-05-03 | End: 2022-05-12 | Stop reason: HOSPADM

## 2022-05-03 RX ORDER — LATANOPROST 50 UG/ML
1 SOLUTION/ DROPS OPHTHALMIC NIGHTLY
Status: DISCONTINUED | OUTPATIENT
Start: 2022-05-03 | End: 2022-05-12 | Stop reason: HOSPADM

## 2022-05-03 RX ORDER — PANTOPRAZOLE SODIUM 40 MG/1
40 TABLET, DELAYED RELEASE ORAL
Status: DISCONTINUED | OUTPATIENT
Start: 2022-05-03 | End: 2022-05-12 | Stop reason: HOSPADM

## 2022-05-03 RX ORDER — ISOSORBIDE MONONITRATE 30 MG/1
30 TABLET, EXTENDED RELEASE ORAL DAILY
Status: DISCONTINUED | OUTPATIENT
Start: 2022-05-03 | End: 2022-05-05

## 2022-05-03 RX ORDER — LANTHANUM CARBONATE 500 MG/1
1000 TABLET, CHEWABLE ORAL
Status: DISCONTINUED | OUTPATIENT
Start: 2022-05-03 | End: 2022-05-12 | Stop reason: HOSPADM

## 2022-05-03 RX ORDER — METOPROLOL SUCCINATE 25 MG/1
25 TABLET, EXTENDED RELEASE ORAL DAILY
Status: DISCONTINUED | OUTPATIENT
Start: 2022-05-03 | End: 2022-05-12 | Stop reason: HOSPADM

## 2022-05-03 RX ORDER — GABAPENTIN 100 MG/1
200 CAPSULE ORAL 3 TIMES DAILY
Status: DISCONTINUED | OUTPATIENT
Start: 2022-05-03 | End: 2022-05-12 | Stop reason: HOSPADM

## 2022-05-03 RX ORDER — HYDRALAZINE HYDROCHLORIDE 10 MG/1
10 TABLET, FILM COATED ORAL 2 TIMES DAILY
Status: DISCONTINUED | OUTPATIENT
Start: 2022-05-03 | End: 2022-05-05

## 2022-05-03 RX ORDER — DEXTROSE MONOHYDRATE 25 G/50ML
12.5 INJECTION, SOLUTION INTRAVENOUS PRN
Status: DISCONTINUED | OUTPATIENT
Start: 2022-05-03 | End: 2022-05-12 | Stop reason: HOSPADM

## 2022-05-03 RX ORDER — OXYCODONE HYDROCHLORIDE AND ACETAMINOPHEN 5; 325 MG/1; MG/1
1 TABLET ORAL EVERY 4 HOURS PRN
Status: DISCONTINUED | OUTPATIENT
Start: 2022-05-03 | End: 2022-05-12 | Stop reason: HOSPADM

## 2022-05-03 RX ORDER — CHOLECALCIFEROL (VITAMIN D3) 10 MCG
1 TABLET ORAL NIGHTLY
Status: DISCONTINUED | OUTPATIENT
Start: 2022-05-03 | End: 2022-05-12 | Stop reason: HOSPADM

## 2022-05-03 RX ORDER — HEPARIN SODIUM (PORCINE) LOCK FLUSH IV SOLN 100 UNIT/ML 100 UNIT/ML
100 SOLUTION INTRAVENOUS PRN
Status: DISCONTINUED | OUTPATIENT
Start: 2022-05-03 | End: 2022-05-12 | Stop reason: HOSPADM

## 2022-05-03 RX ORDER — ACETAMINOPHEN 650 MG/1
650 SUPPOSITORY RECTAL EVERY 6 HOURS PRN
Status: DISCONTINUED | OUTPATIENT
Start: 2022-05-03 | End: 2022-05-12 | Stop reason: HOSPADM

## 2022-05-03 RX ORDER — LIDOCAINE AND PRILOCAINE 25; 25 MG/G; MG/G
CREAM TOPICAL PRN
Status: DISCONTINUED | OUTPATIENT
Start: 2022-05-03 | End: 2022-05-12 | Stop reason: HOSPADM

## 2022-05-03 RX ORDER — SODIUM CHLORIDE 0.9 % (FLUSH) 0.9 %
5-40 SYRINGE (ML) INJECTION EVERY 12 HOURS SCHEDULED
Status: DISCONTINUED | OUTPATIENT
Start: 2022-05-03 | End: 2022-05-05 | Stop reason: SDUPTHER

## 2022-05-03 RX ORDER — BRIMONIDINE TARTRATE 2 MG/ML
1 SOLUTION/ DROPS OPHTHALMIC 2 TIMES DAILY
Status: DISCONTINUED | OUTPATIENT
Start: 2022-05-03 | End: 2022-05-12 | Stop reason: HOSPADM

## 2022-05-03 RX ORDER — ATORVASTATIN CALCIUM 80 MG/1
80 TABLET, FILM COATED ORAL NIGHTLY
Status: DISCONTINUED | OUTPATIENT
Start: 2022-05-03 | End: 2022-05-12 | Stop reason: HOSPADM

## 2022-05-03 RX ORDER — ACETAMINOPHEN 325 MG/1
650 TABLET ORAL EVERY 6 HOURS PRN
Status: DISCONTINUED | OUTPATIENT
Start: 2022-05-03 | End: 2022-05-05 | Stop reason: SDUPTHER

## 2022-05-03 RX ORDER — POLYETHYLENE GLYCOL 3350 17 G/17G
17 POWDER, FOR SOLUTION ORAL DAILY PRN
Status: DISCONTINUED | OUTPATIENT
Start: 2022-05-03 | End: 2022-05-12 | Stop reason: HOSPADM

## 2022-05-03 RX ORDER — DEXTROSE MONOHYDRATE 50 MG/ML
100 INJECTION, SOLUTION INTRAVENOUS PRN
Status: DISCONTINUED | OUTPATIENT
Start: 2022-05-03 | End: 2022-05-12 | Stop reason: HOSPADM

## 2022-05-03 RX ORDER — ONDANSETRON 2 MG/ML
4 INJECTION INTRAMUSCULAR; INTRAVENOUS EVERY 6 HOURS PRN
Status: DISCONTINUED | OUTPATIENT
Start: 2022-05-03 | End: 2022-05-12 | Stop reason: HOSPADM

## 2022-05-03 RX ORDER — BRIMONIDINE TARTRATE, TIMOLOL MALEATE 2; 5 MG/ML; MG/ML
1 SOLUTION/ DROPS OPHTHALMIC EVERY 12 HOURS
Status: DISCONTINUED | OUTPATIENT
Start: 2022-05-03 | End: 2022-05-03 | Stop reason: CLARIF

## 2022-05-03 RX ORDER — SODIUM CHLORIDE 0.9 % (FLUSH) 0.9 %
5-40 SYRINGE (ML) INJECTION PRN
Status: DISCONTINUED | OUTPATIENT
Start: 2022-05-03 | End: 2022-05-05 | Stop reason: SDUPTHER

## 2022-05-03 RX ADMIN — LATANOPROST 1 DROP: 50 SOLUTION OPHTHALMIC at 21:30

## 2022-05-03 RX ADMIN — TICAGRELOR 90 MG: 90 TABLET ORAL at 21:31

## 2022-05-03 RX ADMIN — SODIUM CHLORIDE, PRESERVATIVE FREE 10 ML: 5 INJECTION INTRAVENOUS at 05:44

## 2022-05-03 RX ADMIN — LANTHANUM CARBONATE 1000 MG: 500 TABLET, CHEWABLE ORAL at 18:08

## 2022-05-03 RX ADMIN — OXYCODONE AND ACETAMINOPHEN 1 TABLET: 5; 325 TABLET ORAL at 21:30

## 2022-05-03 RX ADMIN — PANTOPRAZOLE SODIUM 40 MG: 40 TABLET, DELAYED RELEASE ORAL at 05:49

## 2022-05-03 RX ADMIN — Medication 12 UNITS/KG/HR: at 00:13

## 2022-05-03 RX ADMIN — SODIUM CHLORIDE, PRESERVATIVE FREE 10 ML: 5 INJECTION INTRAVENOUS at 01:27

## 2022-05-03 RX ADMIN — NEPHROCAP 1 MG: 1 CAP ORAL at 21:31

## 2022-05-03 RX ADMIN — ATORVASTATIN CALCIUM 80 MG: 80 TABLET, FILM COATED ORAL at 21:30

## 2022-05-03 RX ADMIN — DEXTROSE MONOHYDRATE 12.5 G: 25 INJECTION, SOLUTION INTRAVENOUS at 06:01

## 2022-05-03 RX ADMIN — BRIMONIDINE TARTRATE 1 DROP: 2 SOLUTION OPHTHALMIC at 21:30

## 2022-05-03 RX ADMIN — ASPIRIN 81 MG: 81 TABLET, COATED ORAL at 12:18

## 2022-05-03 RX ADMIN — SODIUM CHLORIDE, PRESERVATIVE FREE 10 ML: 5 INJECTION INTRAVENOUS at 00:58

## 2022-05-03 RX ADMIN — FENTANYL CITRATE 25 MCG: 50 INJECTION, SOLUTION INTRAMUSCULAR; INTRAVENOUS at 01:27

## 2022-05-03 RX ADMIN — LATANOPROST 1 DROP: 50 SOLUTION OPHTHALMIC at 05:44

## 2022-05-03 RX ADMIN — GABAPENTIN 200 MG: 100 CAPSULE ORAL at 21:31

## 2022-05-03 RX ADMIN — SODIUM CHLORIDE, PRESERVATIVE FREE 10 ML: 5 INJECTION INTRAVENOUS at 06:02

## 2022-05-03 RX ADMIN — INSULIN GLARGINE-YFGN 5 UNITS: 100 INJECTION, SOLUTION SUBCUTANEOUS at 21:47

## 2022-05-03 RX ADMIN — TIMOLOL MALEATE 1 DROP: 5 SOLUTION OPHTHALMIC at 21:30

## 2022-05-03 RX ADMIN — LANTHANUM CARBONATE 1000 MG: 500 TABLET, CHEWABLE ORAL at 12:18

## 2022-05-03 RX ADMIN — GABAPENTIN 200 MG: 100 CAPSULE ORAL at 12:18

## 2022-05-03 RX ADMIN — SODIUM CHLORIDE, PRESERVATIVE FREE 10 ML: 5 INJECTION INTRAVENOUS at 11:30

## 2022-05-03 ASSESSMENT — ENCOUNTER SYMPTOMS
BACK PAIN: 1
ABDOMINAL PAIN: 1
NAUSEA: 1
SHORTNESS OF BREATH: 0
COUGH: 0
VOMITING: 1

## 2022-05-03 ASSESSMENT — PAIN DESCRIPTION - ORIENTATION: ORIENTATION: MID

## 2022-05-03 ASSESSMENT — PAIN SCALES - GENERAL
PAINLEVEL_OUTOF10: 9
PAINLEVEL_OUTOF10: 0
PAINLEVEL_OUTOF10: 8
PAINLEVEL_OUTOF10: 9
PAINLEVEL_OUTOF10: 9
PAINLEVEL_OUTOF10: 8

## 2022-05-03 ASSESSMENT — PAIN DESCRIPTION - FREQUENCY: FREQUENCY: CONTINUOUS

## 2022-05-03 ASSESSMENT — PAIN DESCRIPTION - LOCATION: LOCATION: BACK

## 2022-05-03 ASSESSMENT — PAIN DESCRIPTION - DESCRIPTORS: DESCRIPTORS: ACHING

## 2022-05-03 ASSESSMENT — PAIN DESCRIPTION - ONSET: ONSET: ON-GOING

## 2022-05-03 ASSESSMENT — PAIN DESCRIPTION - PAIN TYPE: TYPE: CHRONIC PAIN

## 2022-05-03 ASSESSMENT — PAIN - FUNCTIONAL ASSESSMENT: PAIN_FUNCTIONAL_ASSESSMENT: PREVENTS OR INTERFERES WITH MANY ACTIVE NOT PASSIVE ACTIVITIES

## 2022-05-03 NOTE — ED PROVIDER NOTES
23-year-old female with a history of ESRD on dialysis, last treatment today, hypertension, hyperlipidemia, diabetes, CAD, and mitral valve papillary fibroblastoma who presents as transfer from [facility] for admission for NSTEMI. Patient initially presented at [facility] for abdominal pain that was worsened with sitting up, relieved with certain positions that started today. While in the ED, she developed burning chest pain, that was substernal and nonradiating, no exacerbating or relieving factors, no associated shortness of breath. Work-up at [facility] was concerning for uptrending troponin. Cardiology was consulted and patient was placed on heparin drip. Patient was to be directly admitted here with plans for PCI in the morning, however, at that time, there were no beds available at this facility. Therefore, she was transferred ED to ED. While in the ED, she developed burning chest pain, that was substernal and nonradiating, no exacerbating or relieving factors, no associated shortness of breath. Patient did have nausea and episode of emesis earlier today at dialysis. She complains of lower back pain has been present for couple weeks. She denies all other complaints. Review of Systems   Constitutional: Negative for chills and fever. HENT: Negative for congestion. Respiratory: Negative for cough and shortness of breath. Cardiovascular: Positive for chest pain. Gastrointestinal: Positive for abdominal pain, nausea and vomiting. Genitourinary: Negative for flank pain. Musculoskeletal: Positive for back pain. Skin: Negative for rash and wound. Neurological: Negative for dizziness, light-headedness and headaches. All other systems reviewed and are negative. Physical Exam  Constitutional:       General: She is not in acute distress. Appearance: Normal appearance. She is not ill-appearing. HENT:      Head: Normocephalic and atraumatic.       Right Ear: External ear normal. Left Ear: External ear normal.      Nose: Nose normal.   Eyes:      Conjunctiva/sclera: Conjunctivae normal.   Cardiovascular:      Rate and Rhythm: Normal rate and regular rhythm. Pulmonary:      Effort: Pulmonary effort is normal.      Breath sounds: Normal breath sounds. Abdominal:      Palpations: Abdomen is soft. Comments: Tenderness to palpation diffuse lower abdomen, R>L   Musculoskeletal:         General: No swelling or deformity. Skin:     General: Skin is warm and dry. Neurological:      General: No focal deficit present. Mental Status: She is alert. Cranial Nerves: No cranial nerve deficit. Psychiatric:         Mood and Affect: Mood normal.         Behavior: Behavior normal.          Procedures     MDM  Number of Diagnoses or Management Options  Diagnosis management comments: 60-year-old female with a history of ESRD on dialysis, last treatment today, hypertension, hyperlipidemia, diabetes, CAD, and mitral valve papillary fibroblastoma who presents as transfer from [facility] for admission for NSTEMI. Labs and imaging from [facility] ED reviewed. Patient chest free on arrival, on heparin drip. Dr. Son Aggarwal admitted patient in stable condition. ED Course as of 05/02/22 2336   Mon May 02, 2022   2326 Patient is a 60-year-old female who presents as a transfer from [facility] for admission for NSTEMI. She presents to the ED with a heparin drip infusing. I reviewed her previous ED visit which showed uptrending troponin. Apparently they had spoken with cardiology at [facility] who recommended ED to ED transfer with plan for cardiac catheterization tomorrow, and there is no inpatient bed available in a timely manner. Patient is chest pain-free my examination. States she was having burning in her chest earlier today which has resolved. She is stable.   Plan for admission [JA]      ED Course User Index  [JA] Mana Peñaloza MD --------------------------------------------- PAST HISTORY ---------------------------------------------  Past Medical History:  has a past medical history of Acute infection of bone ([facility] Acute osteomyelitis of phalanx of left hand ([facility] Anemia of chronic disease, Arthritis, Breast cancer ([facility] CAD (coronary artery disease), Carpal tunnel syndrome, Chronic diastolic CHF (congestive heart failure) ([facility] CKD (chronic kidney disease) stage 4, GFR 15-29 ml/min ([facility] Diabetic retinopathy ([facility] Glaucoma, Hemodialysis patient ([facility] Hyperkalemia, diminished renal excretion, Hyperlipidemia, Hypertension, Hypoglycemia unawareness in type 1 diabetes mellitus ([facility] Insulin dependent type 2 diabetes mellitus ([facility] Neuropathy, Osteomyelitis due to secondary diabetes [facility]), Patient is Yazidism, Refusal of blood product, Ventricular hypertrophy, and Vitreous hemorrhage ([facility] Past Surgical History:  has a past surgical history that includes Cholecystectomy; amputation; Mastectomy; Cystoscopy; Cataract removal; Ankle surgery; other surgical history (9/27/2011); ECHO Compl W Dop Color Flow (2/14/2013); Echo Complete (9/17/2013); spinal cord decompression; other surgical history (insertion lumbar drain insertion); other surgical history (10/22/15); other surgical history (11/03/2015); Tunneled venous catheter placement (11/15/2017); Dialysis fistula creation (Left, 01/31/2018); vitrectomy (Left, 04/10/2018); pr rpr retinal dtchmnt w/vitrectomy any meth (Left, 4/10/2018); pr av anast,up arm basilic vein transposit (Left, 5/15/2018); pr ligatn angioaccess av fistula (Left, 9/25/2018); Colonoscopy; Carpal tunnel release (Left, 3/17/2020); transesophageal echocardiogram (11/11/2021); Cardiac catheterization (12/09/2021); Finger amputation (Left, 1/20/2022); bone biopsy (N/A, 4/18/2022); and transesophageal echocardiogram (04/19/2022). Social History:  reports that she has never smoked.  She has never used smokeless tobacco. She reports that she does not drink alcohol and does not use drugs. Family History: family history includes Breast Cancer (age of onset: 61) in her maternal grandmother and mother; Heart Disease in her father; Hypertension in her mother; Prostate Cancer in her father. The patients home medications have been reviewed. Allergies: Furosemide    -------------------------------------------------- RESULTS -------------------------------------------------    LABS:  No results found for this visit on 05/02/22. RADIOLOGY:  No orders to display       ------------------------- NURSING NOTES AND VITALS REVIEWED ---------------------------  Date / Time Roomed:  5/2/2022 10:49 PM  ED Bed Assignment:  ESTHER/ESTHER    The nursing notes within the ED encounter and vital signs as below have been reviewed. Patient Vitals for the past 24 hrs:   BP Temp Temp src Pulse Resp SpO2 Height Weight   05/02/22 2257 (!) 108/39 98.5 °F (36.9 °C) Oral 87 18 98 % 5' 10\" (1.778 m) 163 lb (73.9 kg)       Oxygen Saturation Interpretation: Normal    ------------------------------------------ PROGRESS NOTES   Counseling:  I have spoken with the patient and discussed todays results, in addition to providing specific details for the plan of care and counseling regarding the diagnosis and prognosis. Their questions are answered at this time and they are agreeable with the plan of admission.    --------------------------------- ADDITIONAL PROVIDER NOTES ---------------------------------    This patient's ED course included: a personal history and physicial examination, re-evaluation prior to disposition, multiple bedside re-evaluations, IV medications, cardiac monitoring and continuous pulse oximetry    This patient has remained hemodynamically stable during their ED course. Diagnosis:  1.  NSTEMI (non-ST elevated myocardial infarction) ([facility] [address]        Disposition:  Patient's disposition: Admit to telemetry  Patient's condition is stable.               Merline Petite, MD  Resident  05/03/22 7354

## 2022-05-03 NOTE — PROGRESS NOTES
Pharmacy Consultation Note  (Antibiotic Dosing and Monitoring)    Initial consult date: 5/3/22  Consulting physician/provider: Dr. Marii Mondragon  Drug: Vancomycin  Indication: Osteomyelitis     Age/  Gender Height Weight IBW  Allergy Information   67 y.o./female 5' 10\" (177.8 cm)  5' 10\" (177.8 cm) 163 lb (73.9 kg)     Ideal body weight: 68.5 kg (151 lb 0.2 oz)  Adjusted ideal body weight: 71.9 kg (158 lb 9.7 oz)   Furosemide      Renal Function:  Recent Labs     05/02/22  1757   BUN 9   CREATININE 2.5*     No intake or output data in the 24 hours ending 05/03/22 0922    Vancomycin Monitoring:  Trough:  No results for input(s): VANCOTROUGH in the last 72 hours. Random:    Recent Labs     05/03/22  0400   VANCORANDOM 22.9       Vancomycin Administration Times:  Recent vancomycin administrations      No vancomycin IV orders with administrations found. Date  SCr/CrCl       or HD Drug/Dose Time   Given Level(s)   (Time)   5/3 No HD -- -- 22.9   (0400)                          Assessment:  · Patient is a 78 y.o. female who has been continued on vancomycin for Hx osteomyelitis from last admission  · Receiving vancomycin 750 mg every MWF with dialysis x 6 weeks (End date 6/1/2022) per ID  · Estimated Creatinine Clearance: 24 mL/min (A) (based on SCr of 2.5 mg/dL (H)).   · To dose vancomycin, pharmacy will be utilizing dosing based off of levels due to patient requiring hemodialysis    Plan:  · No HD scheduled for today, no vancomycin  · Will check pre-HD vancomycin level tomorrow AM  · Will continue to monitor renal function   · Clinical pharmacy to follow      Benjamín Alvarado [facility] 5/3/2022 9:22 AM

## 2022-05-03 NOTE — CONSULTS
[facility]  Nephrology Consult Note    Patient's Name: Khushbu Franco     Reason for Consult:  dialysis     Chief Complaint:  back/Abdominal pain  History Obtained From:  patient, past medical records and EMR     History of Present Illness:    Khushbu Franco is a 78 y.o. female with a past medical history of breast cancer, coronary artery disease, hypertension, hyperlipidemia, and anemia. She presented to the [facility] ED on 5/2 with complaints of back and abdominal pain patient was subsequently transferred to [facility] for NSTEMI. Vital signs today include temperature 97.5, respirations 15, pulse 61, BP 98/42, and she was 94% on room air. Lab data from 5/2 include creatinine 2.5, troponin 535, and hemoglobin 9.1. Cardiology is consulted to see the patient. We were consulted to see the patient for dialysis. Patient is known to our service and dialyzes at [address] MWF 1st shift left AV fistula. At present, patient was seen and examined. She reports that she feels \"fair. \"  She reports right hip pain today. She reports that her appetite was fair before coming in to the hospital.  She denied any chest pain or shortness of breath. She denies any nausea, vomiting, or diarrhea. PMH:    Past Medical History:   Diagnosis Date    Acute infection of bone ([address]     infection of rt foot, resolved.     Acute osteomyelitis of phalanx of left hand ([address] 1/27/2022    Left third distal phalanx    Anemia of chronic disease     Arthritis     Breast cancer ([address]     right breast, 2008/ bladder, 2006- last chemotherapy \"years ago\"    CAD (coronary artery disease)     Carpal tunnel syndrome     bilat - for OR left hand 3-17-20     Chronic diastolic CHF (congestive heart failure) ([address] 09/23/2014 9/23/14- echocardiogram revealed moderate LV concentric hypertrophy, stage III diastolic dysfunction, mild tricuspid regurgitation    CKD (chronic kidney disease) stage 4, GFR 15-29 ml/min (HCC)     Diabetic retinopathy ([address]     Glaucoma     Hemodialysis patient ([address]     [facility]- Dr. Linda Rodas - left arm fistula     Hyperkalemia, diminished renal excretion 11/9/2017    Hyperlipidemia     Hypertension     Hypoglycemia unawareness in type 1 diabetes mellitus ([address] 11/7/2017    Insulin dependent type 2 diabetes mellitus ([address]     Neuropathy     feet    Osteomyelitis due to secondary diabetes ([address]     rt great toe with amputation    Patient is Anabaptist 11/7/2017    Refusal of blood product     patient states she dose not take blood transfusion    Ventricular hypertrophy     Vitreous hemorrhage (HCC)     left eye     Patient Active Problem List   Diagnosis    Diabetic retinopathy ([address]    Malignant neoplasm of right female breast ([address]    Atherosclerosis of native coronary artery of native heart without angina pectoris    Moderate obesity    Left ventricular hypertrophy    Herniated lumbar intervertebral disc    Lumbar degenerative disc disease    Pseudomeningocele    Lumbar radiculopathy    Lymphedema of arm    CKD (chronic kidney disease) stage 4, GFR 15-29 ml/min (HCC)    Insulin dependent type 2 diabetes mellitus (HCC)    Anemia of chronic disease    Chronic diastolic CHF (congestive heart failure) (HCC)    Neuropathy    Hypertension    Glaucoma    Refusal of blood product    Pancytopenia ([address]    Controlled type 2 diabetes mellitus with chronic kidney disease on chronic dialysis, with long-term current use of insulin (HCC)    Vitreous hemorrhage ([address]    Patient is Anabaptist    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 ([address]    Hyperkalemia, diminished renal excretion    Chronic pain syndrome    Lumbar post-laminectomy syndrome    Myalgia    Cervicalgia    Diabetic peripheral neuropathy (HCC)    ESRD (end stage renal disease) ([address]    Bilateral carpal tunnel syndrome    Spinal stenosis of lumbar region with neurogenic claudication    [facility]    ESRD (end stage renal disease) on dialysis ([facility]    Mixed hyperlipidemia    Lymphedema of right upper extremity    Coronary artery disease involving native coronary artery of native heart with angina pectoris (HCC)    Ventricular tachycardia (HCC)    Mitral valve disease    CAD in native artery    Lumbar stenosis without neurogenic claudication    Lumbar foraminal stenosis    Back pain    Intractable low back pain    Unable to ambulate    NSTEMI (non-ST elevated myocardial infarction) ([facility])     Meds:     sodium chloride flush  5-40 mL IntraVENous 2 times per day    allopurinol  100 mg Oral Daily    atorvastatin  80 mg Oral Nightly    b complex-C-folic acid  1 mg Oral Nightly    bumetanide  2 mg Oral Once per day on Sun Tue Thu    gabapentin  200 mg Oral TID    hydrALAZINE  10 mg Oral BID    insulin glargine-yfgn  5 Units SubCUTAneous Nightly    isosorbide mononitrate  30 mg Oral Daily    lanthanum  1,000 mg Oral TID WC    latanoprost  1 drop Right Eye Nightly    metoprolol succinate  25 mg Oral Daily    pantoprazole  40 mg Oral QAM AC    brimonidine  1 drop Right Eye BID    And    timolol  1 drop Right Eye BID    ticagrelor  90 mg Oral BID    vancomycin (VANCOCIN) intermittent dosing (placeholder)   Other RX Placeholder      sodium chloride      dextrose      heparin (PORCINE) Infusion 9 Units/kg/hr (05/03/22 0542)     Meds prn:     sodium chloride flush, sodium chloride, ondansetron **OR** ondansetron, polyethylene glycol, acetaminophen **OR** acetaminophen, lidocaine-prilocaine, oxyCODONE-acetaminophen, fentanNYL, glucose, dextrose, glucagon (rDNA), dextrose, heparin (porcine), heparin (porcine)    Meds prior to admission:     No current facility-administered medications on file prior to encounter.      Current Outpatient Medications on File Prior to Encounter   Medication Sig Dispense Refill    vancomycin (VANCOCIN) infusion Infuse 750 mg intravenously three times a week Compound per protocol. 9 g 1    midodrine (PROAMATINE) 5 MG tablet Take 5 mg by mouth daily as needed      gabapentin (NEURONTIN) 100 MG capsule Take 2 capsules by mouth 3 times daily for 180 days. 180 capsule 5    atorvastatin (LIPITOR) 80 MG tablet Take 80 mg by mouth nightly      bumetanide (BUMEX) 2 MG tablet Take 2 mg by mouth three times a week Given Sunday,Tuesday,Thursday      isosorbide mononitrate (IMDUR) 30 MG extended release tablet Take 30 mg by mouth daily      insulin glargine (LANTUS) 100 UNIT/ML injection vial Inject 5 Units into the skin nightly       lidocaine-prilocaine (EMLA) 2.5-2.5 % cream Apply topically as needed for Pain       hydrALAZINE (APRESOLINE) 10 MG tablet Take 1 tablet by mouth 2 times daily 180 tablet 1    metoprolol succinate (TOPROL XL) 25 MG extended release tablet Take 1 tablet by mouth daily 90 tablet 1    lanthanum (FOSRENOL) 1000 MG chewable tablet Take 1,000 mg by mouth 3 times daily (with meals)       allopurinol (ZYLOPRIM) 100 MG tablet Take 1 tablet by mouth daily 90 tablet 1    ticagrelor (BRILINTA) 90 MG TABS tablet Take 1 tablet by mouth 2 times daily 180 tablet 1    B Complex-C-Folic Acid (BONI CAPS) 1 MG CAPS Take 1 mg by mouth nightly       omeprazole (PRILOSEC) 20 MG delayed release capsule Take 20 mg by mouth daily as needed       LUMIGAN 0.01 % SOLN ophthalmic drops Place 1 drop into the right eye nightly       COMBIGAN 0.2-0.5 % ophthalmic solution Place 1 drop into the right eye every 12 hours   7     Allergies:    Furosemide    Social History:     reports that she has never smoked. She has never used smokeless tobacco. She reports that she does not drink alcohol and does not use drugs.     Family History:         Problem Relation Age of Onset   Henrietta Stabs Breast Cancer Mother 61    Hypertension Mother     Heart Disease Father     Prostate Cancer Father     Breast Cancer Maternal Grandmother 60     Review of Systems:   Pertinent items are noted in HPI. Physical Exam:    Patient Vitals for the past 24 hrs:   BP Temp Temp src Pulse Resp SpO2 Height Weight   05/03/22 0845 (!) 98/42 97.5 °F (36.4 °C) Temporal 61 15 94 % -- --   05/03/22 0615 -- -- -- -- -- -- 5' 10\" (1.778 m) 170 lb (77.1 kg)   05/03/22 0045 100/75 98 °F (36.7 °C) Oral 89 16 99 % -- --   05/02/22 2257 (!) 108/39 98.5 °F (36.9 °C) Oral 87 18 98 % 5' 10\" (1.778 m) 163 lb (73.9 kg)     No intake or output data in the 24 hours ending 05/03/22 1036    General: Awake, alert, no acute distress  Neck: No JVD noted  Lungs: Clear bilaterally upper, diminished to the bases bilaterally. Unlabored  CV: Regular rate and rhythm. No rub  Abd: Soft, nontender, nondistended. Active bowel sounds  Skin: Warm and dry. No rash on exposed extremities  Ext: 1+ RUE edema (history of breast CA); No BLE edema ; left AV fistula   Neuro: Awake, answers questions appropriately    Data:    Recent Labs     05/02/22  1757 05/03/22  0400   WBC 6.1 5.4   HGB 9.1* 8.3*   HCT 30.9* 28.4*   MCV 93.6 91.6    238     Recent Labs     05/02/22  1757 05/03/22  0400     --    K 3.6  --      --    CO2 26  --    CREATININE 2.5*  --    BUN 9  --    LABGLOM 23  --    GLUCOSE 92  --    CALCIUM 8.8  --    MG  --  2.4     Vit D, 25-Hydroxy   Date Value Ref Range Status   03/11/2022 28 (L) 30 - 100 ng/mL Final     Comment:     <20 ng/mL. ........... Abhi Ra Deficient  20-30 ng/mL. ......... Abhi Ra Insufficient   ng/mL. ........ Abhi Ra Sufficient  >100 ng/mL. .......... Abhi Ra Toxic       PTH   Date Value Ref Range Status   03/11/2022 241 (H) 15 - 65 pg/mL Final     No results for input(s): ALT, AST, ALKPHOS, BILITOT, BILIDIR in the last 72 hours. No results for input(s): LABALBU in the last 72 hours.     Ferritin   Date Value Ref Range Status   11/09/2017 334 ng/mL Final     Comment:     FERRITIN Reference Ranges:  Adult Males   20 - 60 yrars:    30 - 400 ng/mL  Adult females 16 - 60 years:    13 - 150 ng/mL  Adults greater than 60 years:   no established reference range  Pediatrics:                     no established reference range       Iron   Date Value Ref Range Status   11/09/2017 33 (L) 37 - 145 mcg/dL Final     TIBC   Date Value Ref Range Status   11/09/2017 222 (L) 250 - 450 mcg/dL Final     Vitamin B-12   Date Value Ref Range Status   02/17/2017 1802 (H) 211 - 946 pg/mL Final     Folate   Date Value Ref Range Status   02/17/2017 >20.0 4.8 - 24.2 ng/mL Final     Lab Results   Component Value Date    COLORU Yellow 11/07/2017    NITRU Negative 11/07/2017    GLUCOSEU Negative 11/07/2017    GLUCOSEU NEGATIVE 08/27/2011    KETUA Negative 11/07/2017    UROBILINOGEN 0.2 11/07/2017    BILIRUBINUR Negative 11/07/2017    BILIRUBINUR NEGATIVE 08/27/2011     No results found for: ALVINO, CREURRAN, MACREATRATIO, OSMOU    No components found for: URIC    No results found for: LIPIDPAN    Assessment and Plans:    1. ESRD on HD  OP [facility] MWF 1st shift   left AV fistula  Continue HD MWF    2.  NSTEMI  Troponin 535 on 5/2--> 1388 today  On heparin drip  Cardiology following    3. Back pain  Status post bone biopsy 4/18 for lumbar spine osteomyelitis  On vancomycin  Neurosurgery following    4. Hypertension  BP goal<140/90  BP at goal  Monitor on current regimen and with HD    5. Anemia  Hemoglobin target 10-12  Hemoglobin 8.3 today  Start JAYSHREE as hemoglobin below target  Transfuse for hemoglobin<7  Monitor H&H    6.  Secondary hyperparathyroidism of renal origin  PTH noted in the outpatient setting on 4/25/2022 was 443  Phosphorus noted in the outpatient setting on 4/25/2022 was 5.4  Continue on Fosrenol  Monitor labs    No BMP lab work available at the time of this note. Await BMP.      GOYO Cardona - CNP     Patient seen and examined all key components of the physical performed independently , case discussed with NP, all pertinent labs and radiologic tests personally reviewed agree with above.       Chicho Verduzco MD

## 2022-05-03 NOTE — PROGRESS NOTES
Comprehensive Nutrition Assessment    Type and Reason for Visit:  Initial,Consult,Wound    Nutrition Recommendations/Plan:   1. Recommend and start Nepro renal supplement daily and Walter wound healing supplement BID to help meet increased nutritional needs from wound healing and dialysis. Malnutrition Assessment:  Malnutrition Status: Moderate malnutrition (05/03/22 1318)    Context:  Chronic Illness     Findings of the 6 clinical characteristics of malnutrition:  Energy Intake:  75% or less estimated energy requirements for 1 month or longer  Weight Loss:  Unable to assess (d/t possible fluid shifts ; hx of HD)     Body Fat Loss:  Mild body fat loss Orbital,Triceps   Muscle Mass Loss:  Mild muscle mass loss Temples (temporalis),Clavicles (pectoralis & deltoids)  Fluid Accumulation:  No significant fluid accumulation     Strength:  Not Performed    Nutrition Assessment:    Patient adm s/p NSTEMI ; recent spinal osteomyelitis (s/p fusion) ; hx of ESRD w/ HD ; wounds noted ; hx of breast CA and bladder CA (s/p radiation and chemotherapy) ; hx of DM and CAD ; pt also meets criteria for moderate malnutrition ; will start ONS    Nutrition Related Findings:    I&Os WNL, 1+ edema, A&O x 4, active BS, tenderness to abd, constipation, boggy heels, HD, mild muscle/fat wasting Wound Type: Multiple,Open Wounds,Wound Consult Pending (wounds x 2)       Current Nutrition Intake & Therapies:    Average Meal Intake: 51-75%     ADULT DIET; Regular; Low Sodium (2 gm); Low Potassium (Less than 3000 mg/day); 1200 ml  Diet NPO Exceptions are: Sips of Water with Meds    Anthropometric Measures:  Height: 5' 10\" (177.8 cm)  Ideal Body Weight (IBW): 150 lbs (68 kg)       Current Body Weight: 170 lb (77.1 kg) (5/3, bedscale), 113.3 % IBW.  Weight Source: Bed Scale  Current BMI (kg/m2): 24.4  Usual Body Weight:  (EMR shows past weights of 178# bedscale on 4/13/22 and 193# actual on 6/3/21)                       BMI Categories: Normal Weight (BMI 22.0 to 24.9) age over 72    Estimated Daily Nutrient Needs:  Energy Requirements Based On: Formula  Weight Used for Energy Requirements: Current  Energy (kcal/day): 9217-2851 (REE 1393 x 1.2 SF)  Weight Used for Protein Requirements: Current  Protein (g/day):  (1.2-1.4g/kg CBW)  Method Used for Fluid Requirements: Standard Renal  Fluid (ml/day): per renal    Nutrition Diagnosis:   · Moderate malnutrition,In context of chronic illness related to catabolic illness (hx of breast and bladder CA) as evidenced by poor intake prior to admission,mild muscle loss,mild loss of subcutaneous fat      Nutrition Interventions:   Food and/or Nutrient Delivery: Continue Current Diet,Start Oral Nutrition Supplement  Nutrition Education/Counseling: Education not indicated  Coordination of Nutrition Care: Continue to monitor while inpatient       Goals:  Previous Goal Met: Progressing toward Goal(s)  Goals: PO intake 75% or greater,by next RD assessment       Nutrition Monitoring and Evaluation:   Behavioral-Environmental Outcomes: None Identified  Food/Nutrient Intake Outcomes: Food and Nutrient Intake,Supplement Intake  Physical Signs/Symptoms Outcomes: Biochemical Data,Chewing or Swallowing,GI Status,Fluid Status or Edema,Hemodynamic Status,Meal Time Behavior,Nutrition Focused Physical Findings,Skin,Weight,Constipation    Discharge Planning:     Too soon to determine     Joannadenton Benavides RD, LD  Contact: [phone]

## 2022-05-03 NOTE — CONSULTS
Inpatient Cardiology Consultation      Reason for Consult:  NSTEMI    Consulting Physician: Dr Yessi Mcwilliams     Requesting Physician:  Dr Rosemary Whitfield    Date of Consultation: 5/3/2022    HISTORY OF PRESENT ILLNESS:   Ms Kathy Khan is a 79 yo female who follows with Dr Denver Fulling, most recently seen in the hospital with Dr Tresa Pedro 4/15/2022. PMH includes CAD with h/o PCI -LAD and RCA 5/2021 and [facility] 12/2021 with mild LAD dz,  LCx, OM2 and significant RCA dz with plans for CABG MV resection). 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 Hospitalized 4/15/2022 - 4/22/2022 for osteomyelitis of Lumbar spine. She was seen by Cardiology and underwent bone marrow bx on 4/18/2022 for osteomyelitis -- and further had posterior lumbar fusion 4/19/2022. She underwent KIARA LV systolic function mildly reduced with ejection fraction of 40-45%.  Moderate mitral annular calcification.  Valvular and subvalvular calcification. Mild mitral regurgitation. No definitive evidence of papillary fibroelastoma.  she was discharged home on antibiotics. Presented to [facility] Ghanshyam Quiñonez 5/3/2022 from [facility] for abdominal pain/back pain. VS: 98.5-87-18-98%RA-108/39  Labs:  WBC 8.1, H&H 9.1/30.9, plt 207. K+ 3.6, bun/scr 9/2.5, glucose 92. troponin 323 -- 535 -- 1388  (previously 200s   4/13/2022)     CTA chest: no acute PE   CT lumbar spine : Again seen are severe erosive changes at the L1-2 and L2-3 endplates, highly suggestive discitis-osteomyelitis. Associated spondylolisthesis and inflammatory change again results in severepinal stenosis at L2-3 and moderate spinal stenosis at L1-2. There was concern for STEMI and interventional cardiology was called.  She was started on IV Heparin. She was transferred to [facility] for further evaluation with cardiology. Neurosurgery was consulted. She states that she was having abdominal pain that wraps around her right side down into her left side. She states that she also has chronic lower back pain that radiates down her spine and into her legs. This has bene going on since she was diagnosed with her osteomyelitis. She denies any CP, SOB, and palpitations, orrthopnea or PND. Please note: past medical records were reviewed per electronic medical record (EMR) - see detailed reports under Past Medical/ Surgical History. Past Medical History:    1. Jehovah Witness  2. 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 3. HTN  4. HLD  5. BMI 27.1 on 5/19/2021  6. Anemia  7. Bladder carcinoma s/p chemotherapy  8. Right breast cancer with mastectomy  2005 followed by radiation and chemotherapy, Arimidex. Chronic  lymphedema  RUE. Blima Franky states became a therapy because of multiple back issues and weakened her bones,  port in her right upper chest  9. [facility] admission with SOB and chest discomfort 08/2011.  Cardiac enzymes negative.  CT angiogram of the chest negative for PE and dissection.  EKG:  NSR, nonspecific ST T abnormalities.    10. UFW 46/7530 with severe concentric LVH, normal wall motion.  LAE, RVE, Stage I diastolic dysfunction. 11. MPS 08/2011 with small area of lateral ischemia, normal wall motion and EF.    12. Cardiac catheterization 08/30/2012:  CX 90% mid stenosis, OM2 totally occluded.  LAD 20% stenosis, RVA 50% stenosis, LV normal.  She was treated medically.    13. [facility] admission 02/13/2013 with SOB, orthopnea, nonproductive nocturnal cough and edema.  CXR:  cardiomegaly and bilateral effusion.  EKG:  NSR, LAE, low voltage, PVC, poor R wave progression.  BUN 23, Cr 1.5, BNP 1868.  Cardiac enzymes negative.  Hemoglobin 9.9, WBC 5.4.  Rx with aggressive diuretic therapy.    14.  Echo 02/14/2013: [address], normal LV systolic function, Stage II diastolic dysfunction, LAE, normal RVSP, no valvulopathy. 15. Lifetime non-smoker. 16. NKDA.  She had worsening renal function in the past when taking lasix. 17. Echo, 07/17/2013. Mild concentric LVH with normal systolic function. RVSP 53 mmHg. Otherwise normal study. 18. [facility] admission with worsening back pain and inability to walk on 09/22/2014 associated with mild SOB. CXR: Pulmonary vascular congestion. EKG: NSR, possible old inferior MI. Electrolytes normal, BUN=30, Cr=2.0, AKK=[id number], Hb=11.1, WBC=5.9, cardiac enzymes negative. 19. Echo, 09/23/2014. Moderate CLVH, normal wall motion, Stage II diastolic dysfunction, RVE, OFELIA, RVSP=50 mmHg. 20. Surgery, Dr. Tameka Lombardi, 09/25/2014. Decompressive lumbar laminectomy L2-L5 with fusion L3-4 and L4-5. 21. Insertion intrathecal drain, 10/04/2014 for CSF leak, removed several days later, but reinserted 10/12/2014 for recurrent leak. 22. Implant spinal cord stimulator, Penta lead from [facility] by thoracic laminotomy, T10.   Implant IPG Protege in the right buttock  23. TTE 11/2017, EF 55%.  Mild LVH.  Stage II diastolic dysfunction.  Moderate to severe left atrial dilatation.  Mild MR.  RVSP 76.  24. 2017 ESRD dialysis initiated  MWF   25. Abdominal CT 2017: Extensive anasarca.  Extensive arteriosclerotic disease. 26. Surgical history: Correction of Charcot joint right, carpal tunnel release 3/2020, cholecystectomy, dialysis fistula creation, mastectomy, laser treatment of eyes, spinal cord stimulator, surgery for retinal detachment.  Spinal cord decompression L2.  27. 11/2017 Insertion of right internal jugular vein tunneled hemodialysis catheter fluoroscopy Removal of right femoral temporary hemodialysis catheter  28. 1/31/2018 Creation of L brachiocephalic AVF  29. 3/07/5438 QGCVFISIELICW of left upper extremity brachiobasilic  fistula, stage II  30. 7/66/6667 NAHBKPHOGLNTIRUHVG of L basilic AVF  31. 97/75/4634  TTE: EF 60-65%. Moderate CLVH. No VHD. 32. Ambulates with walker  33. Completed COVID Vaccine  29. 2D echocardiogram [facility] December 8, 2020 EF 60 to 20% and diastolic dysfunction with moderate concentric left ventricular hypertrophy and moderate MAC and aortic valve sclerosis  35. Hospital admission May 2021 with a reported shockable rhythm by AED and received 1 defibrillation and had reported ventricular tachycardia in the emergency room and was started on a lidocaine drip, elevated troponin but no acute ischemic EKG changes, started on aspirin and Lipitor resumed and beta-blockers and nitrates initiated  36. Lexiscan stress test May 19, 2021, abnormal with a large fixed defect in the apical and septal wall small fixed defect in the inferior apical wall partially reversible defect in the small area of the anterior lateral wall and EF 25% with apical septal akinesis and moderate global hypokinesis and dilated left ventricle during stress and rest and was a high risk study  37. Left heart cath May 2021 she underwent stenting to the LAD and RCA. CONCLUSION: Coronary artery disease: Left main:  20% eccentric mid vessel narrowing. LAD:  50% proximal vessel narrowing and 95% mid vessel stenosis. Trivial diagonal branch with 90% stenosis. LCX:  Occluded after a small caliber first marginal branch which is a chronic total occlusion.  The second larger marginal branch filled late. RCA:  Large, dominant vessel with 90% heavily calcified mid vessel stenosis.  50% disease at the level of the crux and 70% ostial stenosis of the posterior descending artery branch. Markedly elevated left ventricular end-diastolic pressure. Successful balloon angioplasty with deployment of drug-eluting coronary stent to the mid LAD with very good results.  Successful balloon angioplasty with deployment of drug-eluting coronary stent to the mid RCA with poststent deployment dilatation with a larger high-pressure noncompliant balloon with good results. 38. Lasix allergy with questionable renal failure   39. 2D echocardiogram on May 21, 2021 left ventricle is dilated There is apical, septal and basal inferior wall severe hypokinesis to akinesis Ejection fraction is visually estimated at 30+/-5%. There is doppler evidence of stage III diastolic dysfunction. Normal right ventricular size. Right ventricle global systolic function is mildly reduced . The left atrium is moderately dilated. Severe posterior mitral annular calcification. Focal calcification mitral valve leaflets. Chordal calcification is present. A mobile well defined 1.0 cm by 0.5 cm echo density is noted on the ventricular aspect of the mitral valve suggestive of a fibroelastoma: clinical correlation is needed. No mitral stenosis. Mild mitral regurgitation. Mild to moderate tricuspid regurgitation. Moderate pulmonary hypertension. Aortic root is sclerotic and calcified.   40. 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 6 cm x .8cm  41. KIARA November 11, 2021 for mitral leaflet mass Normal LV function, normal RV function, no hemodynamically significant valve disease(mobile posterior leaflet echodensity with leaflet restriction and mild MR), no evidence of vegetations, no left atrial or left atrial appendage thrombus (no evidence of spontaneous echo contrast), no intraatrial shunting on bubble study, no significant atherosclerosis of the aorta  42. Left heart cath December 9, 2021 ANGIOGRAPHIC FINDINGS: The left main coronary artery arises normally from the left sinus of Valsalva.  It is a large vessel without any disease. Glenna Collar is mild distal calcification.  It bifurcates into left anterior descending artery and left circumflex artery.  The left anterior descending artery has moderate-to-severe proximal and ostial calcifications.  There is 20% ostial disease.  The rest of the vessel is mildly diffusely diseased.  There is patent stent in the mid segment.  The LAD gives off a few small diagonal branches.  The second one is totally occluded.  The rest are mildly diseased. The left circumflex artery is a large vessel that has total occlusion after the takeoff of the second OM branch.  The first OM branch is a mid size vessel that is mildly diseased.  The second one is the small that has chronic total occlusion with a faint left-to-left filling collaterals. The right coronary artery has inferior takeoff.  There are stents in the mid and proximal segments.  There is 80% in-stent stenosis in the mid segment.  The rest of the vessel has luminal irregularities.  It gives off good size right PDA and good size right PLV that has no significant CAD noted. Luis Manuel Chuyans is significant ostial RCA also noted as evident by damping of pressure on engagement and lack of contrast reflux. IMPRESSION: Mild disease involving left anterior descending artery with a patent stent in the mid segment. Totally occluded left circumflex artery. Total occlusion of the second OM branch with left-to-left collaterals. Significant ostial RCA and significant mid RCA disease as mentioned above. RECOMMENDATIONS:  Continue current treatment as per CT Surgery.  Per CTS, she was taking scheduled for a CABG x2 With a saphenous vein graft to the RCA and a saphenous vein graft to the circumflex and excision of mitral valve mass  43. January 3, 2022 osteomyelitis left middle finger left hand, January 20, 2022 amputation left third digit distal phalanx for osteomyelitis (open heart postponed) patient stopped taking aspirin at that time but was only of Brilinta a day or 2  44. Chronic hemodialysis on Monday Wednesday and Friday/renal osteodystrophy  45. Anemia  46. KIARA 4/19/2022 Rajwinder Spangler):  LV systolic function mildly reduced. Ejection fraction is visually estimated at 40-45%. Moderate mitral annular calcification. Valvular and subvalvular calcification. Mild mitral regurgitation.   No definitive evidence of papillary fibroelastoma. Medications Prior to admit:  Prior to Admission medications    Medication Sig Start Date End Date Taking? Authorizing Provider   vancomycin (VANCOCIN) infusion Infuse 750 mg intravenously three times a week Compound per protocol. 4/22/22 6/1/22  Nicole Beckwith MD   midodrine (PROAMATINE) 5 MG tablet Take 5 mg by mouth daily as needed    Historical Provider, MD   gabapentin (NEURONTIN) 100 MG capsule Take 2 capsules by mouth 3 times daily for 180 days.  3/24/22 9/20/22  Ricardo Lowery MD   atorvastatin (LIPITOR) 80 MG tablet Take 80 mg by mouth nightly    Historical Provider, MD   bumetanide (BUMEX) 2 MG tablet Take 2 mg by mouth three times a week Given Sunday,Tuesday,Thursday    Historical Provider, MD   isosorbide mononitrate (IMDUR) 30 MG extended release tablet Take 30 mg by mouth daily    Historical Provider, MD   insulin glargine (LANTUS) 100 UNIT/ML injection vial Inject 5 Units into the skin nightly     Historical Provider, MD   lidocaine-prilocaine (EMLA) 2.5-2.5 % cream Apply topically as needed for Pain     Historical Provider, MD   hydrALAZINE (APRESOLINE) 10 MG tablet Take 1 tablet by mouth 2 times daily 2/3/22   Juan Manuel Sheikh MD   metoprolol succinate (TOPROL XL) 25 MG extended release tablet Take 1 tablet by mouth daily 11/24/21   Juan Manuel Sheikh MD   lanthanum (FOSRENOL) 1000 MG chewable tablet Take 1,000 mg by mouth 3 times daily (with meals)  8/9/21   Historical Provider, MD   allopurinol (ZYLOPRIM) 100 MG tablet Take 1 tablet by mouth daily 9/7/21   Juan Manuel Sheikh MD   ticagrelor (BRILINTA) 90 MG TABS tablet Take 1 tablet by mouth 2 times daily 9/7/21   Juan Manuel Sheikh MD   B Complex-C-Folic Acid (BONI CAPS) 1 MG CAPS Take 1 mg by mouth nightly     Historical Provider, MD   omeprazole (PRILOSEC) 20 MG delayed release capsule Take 20 mg by mouth daily as needed     Historical Provider, MD MCHUGH 0.01 % SOLN ophthalmic drops Place 1 drop into the right eye nightly  6/9/20   Historical Provider, MD   COMBIGAN 0.2-0.5 % ophthalmic solution Place 1 drop into the right eye every 12 hours  8/1/19   Historical Provider, MD       Current Medications:    Current Facility-Administered Medications: sodium chloride flush 0.9 % injection 5-40 mL, 5-40 mL, IntraVENous, 2 times per day  sodium chloride flush 0.9 % injection 5-40 mL, 5-40 mL, IntraVENous, PRN  0.9 % sodium chloride infusion, , IntraVENous, PRN  ondansetron (ZOFRAN-ODT) disintegrating tablet 4 mg, 4 mg, Oral, Q8H PRN **OR** ondansetron (ZOFRAN) injection 4 mg, 4 mg, IntraVENous, Q6H PRN  polyethylene glycol (GLYCOLAX) packet 17 g, 17 g, Oral, Daily PRN  acetaminophen (TYLENOL) tablet 650 mg, 650 mg, Oral, Q6H PRN **OR** acetaminophen (TYLENOL) suppository 650 mg, 650 mg, Rectal, Q6H PRN  allopurinol (ZYLOPRIM) tablet 100 mg, 100 mg, Oral, Daily  atorvastatin (LIPITOR) tablet 80 mg, 80 mg, Oral, Nightly  b complex-C-folic acid (NEPHROCAPS) capsule 1 mg, 1 mg, Oral, Nightly  bumetanide (BUMEX) tablet 2 mg, 2 mg, Oral, Once per day on Sun Tue Thu  gabapentin (NEURONTIN) capsule 200 mg, 200 mg, Oral, TID  hydrALAZINE (APRESOLINE) tablet 10 mg, 10 mg, Oral, BID  insulin glargine-yfgn (SEMGLEE-YFGN) injection vial 5 Units, 5 Units, SubCUTAneous, Nightly  isosorbide mononitrate (IMDUR) extended release tablet 30 mg, 30 mg, Oral, Daily  lanthanum (FOSRENOL) chewable tablet 1,000 mg, 1,000 mg, Oral, TID WC  lidocaine-prilocaine (EMLA) cream, , Topical, PRN  latanoprost (XALATAN) 0.005 % ophthalmic solution 1 drop, 1 drop, Right Eye, Nightly  metoprolol succinate (TOPROL XL) extended release tablet 25 mg, 25 mg, Oral, Daily  pantoprazole (PROTONIX) tablet 40 mg, 40 mg, Oral, QAM AC  oxyCODONE-acetaminophen (PERCOCET) 5-325 MG per tablet 1 tablet, 1 tablet, Oral, Q4H PRN  fentaNYL (SUBLIMAZE) injection 25 mcg, 25 mcg, IntraVENous, Q2H PRN  brimonidine (ALPHAGAN) 0.2 % ophthalmic solution 1 drop, 1 drop, Right Eye, BID **AND** timolol (TIMOPTIC) 0.5 % ophthalmic solution 1 drop, 1 drop, Right Eye, BID  ticagrelor (BRILINTA) tablet 90 mg, 90 mg, Oral, BID  glucose (GLUTOSE) 40 % oral gel 15 g, 15 g, Oral, PRN  dextrose 50 % IV solution, 12.5 g, IntraVENous, PRN  glucagon (rDNA) injection 1 mg, 1 mg, IntraMUSCular, PRN  dextrose 5 % solution, 100 mL/hr, IntraVENous, PRN  heparin 25,000 units in dextrose 5% 250 mL (premix) infusion, 5-30 Units/kg/hr, IntraVENous, Continuous  heparin (porcine) injection 2,000 Units, 2,000 Units, IntraVENous, PRN  heparin (porcine) injection 4,000 Units, 4,000 Units, IntraVENous, PRN    Allergies:  Furosemide    Social History:    Resides at home alone. limited acticvity secondary to chronic back pain. Uses a walker for ambulation. Does not require supplemental O2. Denies alcohol and illicit drug use. Full code      Family History:   Father: Hx of MI with stents (age 63's)       REVIEW OF SYSTEMS:     · Constitutional: Denies fevers, chills, night sweats, and fatigue  · HEENT: Denies headaches, nose bleeds, and blurred vision,oral pain, abscess or lesion. · Musculoskeletal: Denies falls, pain to BLE with ambulation and edema to BLE. · Neurological: Denies dizziness and lightheadedness, numbness and tingling  · Cardiovascular: Denies chest pain, palpitations, and feelings of heart racing. · Respiratory: Denies orthopnea and PND, cough, FRENCH  · Gastrointestinal: + abdominal pain. Denies heartburn, nausea/vomiting, diarrhea and constipation, black/bloody, and tarry stools. · Genitourinary: Denies dysuria and hematuria  · Hematologic: Denies excessive bruising or bleeding  · Lymphatic: Denies lumps and bumps to neck, axilla, breast, and groin      PHYSICAL EXAM:   /75   Pulse 89   Temp 98 °F (36.7 °C) (Oral)   Resp 16   Ht 5' 10\" (1.778 m)   Wt 170 lb (77.1 kg)   SpO2 99%   BMI 24.39 kg/m²   CONST:  Well developed, obese  female who appears her stated age.  Awake, alert, cooperative, no apparent distress  HEENT:   Head- Normocephalic, atraumatic   Eyes- Conjunctivae pink, anicteric  Throat- Oral mucosa pink and moist  Neck-  No stridor, trachea midline, no jugular venous distention. CHEST: Chest symmetrical and non-tender to palpation. RESPIRATORY: Lung sounds clear throughout fields bilaterally. No wheeze or rhonchi noted. CARDIOVASCULAR:     No carotid bruit noted bilaterally   Heart Ausculation- Regular rate and rhythm, no murmur. No s3, s4 or rub   PV: No lower extremity edema. No varicosities. ABDOMEN: Soft, non-tender to light palpation. Bowel sounds present. MS: Good muscle strength and tone. No atrophy or abnormal movements. : Deferred   SKIN: Warm and dry no statis dermatitis or ulcers   NEURO / PSYCH: Oriented to person, place and time. Speech clear and appropriate. Follows all commands. Pleasant affect     DATA:    ECG: not available to view   Tele strips:  SR HR 60 PVCs   Diagnostic:      Labs:   CBC:   Recent Labs     05/02/22  1757 05/03/22  0400   WBC 6.1 5.4   HGB 9.1* 8.3*   HCT 30.9* 28.4*    238     BMP:   Recent Labs     05/02/22  1757      K 3.6   CO2 26   BUN 9   CREATININE 2.5*   LABGLOM 23   CALCIUM 8.8     Mag:   Recent Labs     05/03/22  0400   MG 2.4     HgA1c:   Lab Results   Component Value Date    LABA1C 5.5 04/15/2022     APTT:  Recent Labs     05/02/22  2036 05/03/22  0400   APTT 42.9* 108.7*     FASTING LIPID PANEL:  Lab Results   Component Value Date    CHOL 100 04/15/2022    HDL 27 04/15/2022    LDLCALC 52 04/15/2022    TRIG 107 04/15/2022         Assessment and Plan per Dr Janette Rdz  Electronically signed by Ariadne Guerra on 5/3/2022 at 8:54 AM     I personally and independently saw and examined patient and reviewed all done pertinent laboratory data, imaging studies, ECGs and rhythm strips. I have reviewed and agree with the APN history and physical exam as documented in the above note.     Electronically signed by Elbert Mccullough MD on 5/3/2022 at 1:01 PM     We were asked to see the patient for NSTEMI      IMPRESSION:  1. NSTEMI  2. CAD with h/o LAD and RCA stents 5/2021 and with cath 12/21 showing patent LAD stent, hi grade  focal in stent restenosis of the RCA and known  of OM2. Was supposed to have CABG x 2 with resection of the MV fibroelastoma nad possible mitral valve repair  3. Ischemic cardiomyopathy   4. 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 Insulin requiring DM with neuropathy, retinopathy and nephropathy  6. ESRD on hemodialysis  7. Borderline low BP. H/o HTN  8. HLD  9. Jehovah Witness  10. Bladder carcinoma s/p chemotherapy  11. Osteomyelitis of lumbar spine L1-L3 s/p bone marrow bx and fusion 4/19/2022. H/o  DDD lumbar spine with h/o lumbar laminectomy and spinal cord stimulator with intractable back pain  12. S/p amputation of left hand distal middle finger for osteomyelitis  13. Chronic anemia         PLAN:   1. Add Aspirin 81mg daily   2. Continue Heparin   3. Rest of home cardiac medications the same   4. Will tentatively plan for cardiac catheterization  +/-  PCI tomorrow 5/4/2022   5.  Will follow      Electronically signed by Elbert Mccullough MD on 5/3/2022 at 10:48 AM

## 2022-05-03 NOTE — H&P
Inpatient H&P      PCP:  Srikanth Poole MD  Admitting Physician:  Benjamín Lawson DO  Consultants: Cardiology, nephrology, neurosurgery  Chief Complaint:    Chief Complaint   Patient presents with    Back Pain     pt transfer from [facility] for NSTEMI, arrives on heparin drip and to have heart cath in the AM       History of Present Illness  Lou Larsen is a 78 y.o. female who presents to [facility] ER complaining of back pain and abdominal pain. Lou Larsen has a past medical history that includes lumbar osteomyelitis, coronary artery disease, ESRD, anemia, hypertension, hyperlipidemia, diabetes, history of bladder carcinoma. Sanford Young originally presented to [facility] for back pain and abdominal pain. She states this is been going on for several weeks and is worsening over the past few days. She was recently admitted due to osteomyelitis. She was recently discharged to rehab. She continues on vancomycin. In the ER, there was concern for STEMI on EKG. Interventional cardiology was consulted from the ER and they advised no acute STEMI on EKG however they will plan to do a PCI in the morning. She was placed on heparin drip. He was transferred to see Ryan Pa for continued work-up. ER also spoke with neurosurgeon, Dr. Nicole Escobar for continued signs of osteomyelitis. Now CP free    ER Course  Upon presentation to the ER, routine labwork was performed which revealed creatinine 2.5, troponin 323-535. Imaging results are as outlined below in the Imaging section of this note. EKG revealed atrial fibrillationST elevation noted in leads aVR ST depressions noted in leads V4 through V6 as well as in leads I to, ST elevation in leads V1. Repeat EKG. The patient received heparin drip in the emergency room and was admitted under the care of [facility] physicians.     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 Ejection fraction is visually estimated at 40-45%. Moderate mitral annular calcification. Valvular and subvalvular calcification. Mild mitral regurgitation. No definitive evidence of papillary fibroelastoma. ED TRIAGE VITALS  BP: (!) 108/39, Temp: 98.5 °F (36.9 °C), Pulse: 87, Resp: 18, SpO2: 98 %    Vitals:    05/02/22 2257   BP: (!) 108/39   Pulse: 87   Resp: 18   Temp: 98.5 °F (36.9 °C)   TempSrc: Oral   SpO2: 98%   Weight: 163 lb (73.9 kg)   Height: 5' 10\" (1.778 m)         Histories  Past Medical History:   Diagnosis Date    Acute infection of bone (HCC)     infection of rt foot, resolved.     Acute osteomyelitis of phalanx of left hand ([address] 1/27/2022    Left third distal phalanx    Anemia of chronic disease     Arthritis     Breast cancer ([address]     right breast, 2008/ bladder, 2006- last chemotherapy \"years ago\"    CAD (coronary artery disease)     Carpal tunnel syndrome     bilat - for OR left hand 3-17-20     Chronic diastolic CHF (congestive heart failure) ([address] 09/23/2014 9/23/14- echocardiogram revealed moderate LV concentric hypertrophy, stage III diastolic dysfunction, mild tricuspid regurgitation    CKD (chronic kidney disease) stage 4, GFR 15-29 ml/min ([facility])     Diabetic retinopathy ([address]     Glaucoma     Hemodialysis patient ([address]     [facility] wed fri- Dr. Josefina Eden - left arm fistula     Hyperkalemia, diminished renal excretion 11/9/2017    Hyperlipidemia     Hypertension     Hypoglycemia unawareness in type 1 diabetes mellitus ([address] 11/7/2017    Insulin dependent type 2 diabetes mellitus ([address]     Neuropathy     feet    Osteomyelitis due to secondary diabetes [address]     rt great toe with amputation    Patient is Mormon 11/7/2017    Refusal of blood product     patient states she dose not take blood transfusion    Ventricular hypertrophy     Vitreous hemorrhage ([address]     left eye     Past Surgical History:   Procedure Laterality Date    AMPUTATION      right great toe    ANKLE SURGERY      correction on charcot joint of right ankle    BONE BIOPSY N/A 4/18/2022    BONE BIOPSY PERCUTANEOUS L1/L2 performed by Laureano Sutton MD at [facility]  12/09/2021    Dr Usman Hartley Left 3/17/2020    LEFT CARPAL TUNNEL RELEASE performed by Karene Lundborg, MD at [address] [place] [place] [facility]      bilateral    CHOLECYSTECTOMY      COLONOSCOPY      CYSTOSCOPY      DIALYSIS FISTULA CREATION Left 01/31/2018    upper arm/Dr. Mei Gann    ECHO COMPL W DOP COLOR FLOW  2/14/2013         ECHO COMPLETE  9/17/2013         FINGER AMPUTATION Left 1/20/2022    AMPUTATION OF LEFT THIRD DIGIT, DISTAL PHALANX performed by Wayne Mas MD at [address]      right    OTHER SURGICAL HISTORY  9/27/2011    PPV, membranectomy, laser Right eye    OTHER SURGICAL HISTORY  insertion lumbar drain insertion    10/12/`14    OTHER SURGICAL HISTORY  10/22/15    percutaneous lead placement for spinal cord stimulator    OTHER SURGICAL HISTORY  11/03/2015    Spinal; cord stimulator- turned off as of 3-10-20     [place] AV ANAST,UP ARM BASILIC VEIN TRANSPOSIT Left 5/15/2018    TRANSPOSITION STAGE II AV FISTULA - LEFT UPPER ARM performed by Araceli De Jesus MD at [address] [facility] ANGIOACCESS AV FISTULA Left 9/25/2018    SUPERFICIALIZATION AV FISTULA - LEFT ARM performed by Araceli De Jesus MD at [address] METH Left 4/10/2018    PARS PLANA VITRECTOMY 25 GAUGE RETINAL DETACHMENT REPAIR air fluid exchange, endolaser performed by Twan Lynch MD at [address] [facility] DECOMPRESSION      L2    TRANSESOPHAGEAL ECHOCARDIOGRAM  11/11/2021    Dr. Ken Kwan TRANSESOPHAGEAL ECHOCARDIOGRAM  04/19/2022        TUNNELED VENOUS CATHETER PLACEMENT  11/15/2017    VITRECTOMY Left 04/10/2018    PARS PLANA VITRECTOMY; RETINAL DETACHMENT REPAIR; GAS BUBBLE; LASER LEFT EYE     Family History   Problem Relation Age of Onset    Breast Cancer Mother 61    Hypertension Mother     Heart Disease Father     Prostate Cancer Father     Breast Cancer Maternal Grandmother 61       Home Medications  Prior to Admission medications    Medication Sig Start Date End Date Taking? Authorizing Provider   vancomycin (VANCOCIN) infusion Infuse 750 mg intravenously three times a week Compound per protocol. 4/22/22 6/1/22  Cookie Hammond MD   midodrine (PROAMATINE) 5 MG tablet Take 5 mg by mouth daily as needed    Historical Provider, MD   gabapentin (NEURONTIN) 100 MG capsule Take 2 capsules by mouth 3 times daily for 180 days.  3/24/22 9/20/22  Catina Ledezma MD   atorvastatin (LIPITOR) 80 MG tablet Take 80 mg by mouth nightly    Historical Provider, MD   bumetanide (BUMEX) 2 MG tablet Take 2 mg by mouth three times a week Given Sunday,Tuesday,Thursday    Historical Provider, MD   isosorbide mononitrate (IMDUR) 30 MG extended release tablet Take 30 mg by mouth daily    Historical Provider, MD   insulin glargine (LANTUS) 100 UNIT/ML injection vial Inject 5 Units into the skin nightly     Historical Provider, MD   lidocaine-prilocaine (EMLA) 2.5-2.5 % cream Apply topically as needed for Pain     Historical Provider, MD   hydrALAZINE (APRESOLINE) 10 MG tablet Take 1 tablet by mouth 2 times daily 2/3/22   Greta Hines MD   metoprolol succinate (TOPROL XL) 25 MG extended release tablet Take 1 tablet by mouth daily 11/24/21   Greta Hines MD   lanthanum (FOSRENOL) 1000 MG chewable tablet Take 1,000 mg by mouth 3 times daily (with meals)  8/9/21   Historical Provider, MD   allopurinol (ZYLOPRIM) 100 MG tablet Take 1 tablet by mouth daily 9/7/21   Austyn Lucas MD   ticagrelor (BRILINTA) 90 MG TABS tablet Take 1 tablet by mouth 2 times daily 9/7/21   Austyn Lucas MD   B Complex-C-Folic Acid (BONI CAPS) 1 MG CAPS Take 1 mg by mouth nightly     Historical Provider, MD   omeprazole (PRILOSEC) 20 MG delayed release capsule Take 20 mg by mouth daily as needed     Historical Provider, MD MCHUGH 0.01 % SOLN ophthalmic drops Place 1 drop into the right eye nightly  6/9/20   Historical Provider, MD   COMBIGAN 0.2-0.5 % ophthalmic solution Place 1 drop into the right eye every 12 hours  8/1/19   Historical Provider, MD       Allergies  Furosemide    Social Hx  Social History     Socioeconomic History    Marital status: Single     Spouse name: Not on file    Number of children: Not on file    Years of education: Not on file    Highest education level: Not on file   Occupational History    Not on file   Tobacco Use    Smoking status: Never Smoker    Smokeless tobacco: Never Used   Vaping Use    Vaping Use: Never used   Substance and Sexual Activity    Alcohol use: No    Drug use: No    Sexual activity: Not Currently   Other Topics Concern    Not on file   Social History Narrative    Not on file     Social Determinants of Health     Financial Resource Strain:     Difficulty of Paying Living Expenses: Not on file   Food Insecurity:     Worried About Running Out of Food in the Last Year: Not on file    Fior of Food in the Last Year: Not on file   Transportation Needs:     Lack of Transportation (Medical): Not on file    Lack of Transportation (Non-Medical):  Not on file   Physical Activity:     Days of Exercise per Week: Not on file    Minutes of Exercise per Session: Not on file   Stress:     Feeling of Stress : Not on file   Social Connections:     Frequency of Communication with Friends and Family: Not on file    Frequency of Social Gatherings with Friends and Family: Not on file    Attends [employer] Services: Not on file    Active Member of Clubs or Organizations: Not on file    Attends Club or Organization Meetings: Not on file    Marital Status: Not on file   Intimate Partner Violence:     Fear of Current or Ex-Partner: Not on file    Emotionally Abused: Not on file    Physically Abused: Not on file    Sexually Abused: Not on file   Housing Stability:     Unable to Pay for Housing in the Last Year: Not on file    Number of [place] in the Last Year: Not on file    Unstable Housing in the Last Year: Not on file       Review of Systems  All bolded are positive; please see HPI  General:  Fever, chills, diaphoresis, fatigue, malaise, night sweats, weight loss  Psychological:  Anxiety, disorientation, hallucinations. ENT:  Epistaxis, headaches, vertigo, visual changes. Cardiovascular:  Chest pain, irregular heartbeats, palpitations, paroxysmal nocturnal dyspnea. Respiratory:  Shortness of breath, coughing, sputum production, hemoptysis, wheezing, orthopnea.   Gastrointestinal:  Nausea, vomiting, diarrhea, heartburn, constipation, abdominal pain, hematemesis, hematochezia, melena, acholic stools  Genito-Urinary:  Dysuria, urgency, frequency, hematuria  Musculoskeletal:  Joint pain, joint stiffness, joint swelling, muscle pain back pain  Neurology:  Headache, focal neurological deficits, weakness, numbness, paresthesia  Derm:  Rashes, ulcers, excoriations, bruising  Extremities:  Decreased ROM, peripheral edema, mottling    Physical Examination  Vitals:  BP (!) 108/39   Pulse 87   Temp 98.5 °F (36.9 °C) (Oral)   Resp 18   Ht 5' 10\" (1.778 m)   Wt 163 lb (73.9 kg)   SpO2 98%   BMI 23.39 kg/m²   General Appearance:  awake, alert, and oriented to person, place, time, and purpose; appears stated age and cooperative; no apparent distress no labored breathing  HEENT:  NCAT; PERRL; conjunctiva pink, sclera clear  Neck:  no adenopathy, bruit, JVD, tenderness, masses, or nodules; supple, symmetrical, trachea midline, thyroid not enlarged  Lung:  clear to auscultation bilaterally; no use of accessory muscles; no rhonchi, rales, or crackles  Heart:  regular rate and regular rhythm without murmur, rub, or gallop  Abdomen:  soft, nontender, nondistended; normoactive bowel sounds; no organomegaly  Extremities:  extremities normal, atraumatic, no cyanosis or edema  Musculokeletal:  no joint swelling, no muscle tenderness. ROM normal in all joints of extremities.    Neurologic:  mental status A&Ox3, thought content appropriate; CN II-XII grossly intact; sensation intact, motor strength 5/5 globally; no slurred speech    Laboratory Data  Recent Results (from the past 24 hour(s))   CBC with Auto Differential    Collection Time: 05/02/22  5:57 PM   Result Value Ref Range    WBC 6.1 4.5 - 11.5 E9/L    RBC 3.30 (L) 3.50 - 5.50 E12/L    Hemoglobin 9.1 (L) 11.5 - 15.5 g/dL    Hematocrit 30.9 (L) 34.0 - 48.0 %    MCV 93.6 80.0 - 99.9 fL    MCH 27.6 26.0 - 35.0 pg    MCHC 29.4 (L) 32.0 - 34.5 %    RDW 18.3 (H) 11.5 - 15.0 fL    Platelets 416 057 - 855 E9/L    MPV 10.6 7.0 - 12.0 fL    Neutrophils % 73.2 43.0 - 80.0 %    Immature Granulocytes % 0.5 0.0 - 5.0 %    Lymphocytes % 15.7 (L) 20.0 - 42.0 %    Monocytes % 9.1 2.0 - 12.0 %    Eosinophils % 1.0 0.0 - 6.0 %    Basophils % 0.5 0.0 - 2.0 %    Neutrophils Absolute 4.43 1.80 - 7.30 E9/L    Immature Granulocytes # 0.03 E9/L    Lymphocytes Absolute 0.95 (L) 1.50 - 4.00 E9/L    Monocytes Absolute 0.55 0.10 - 0.95 E9/L    Eosinophils Absolute 0.06 0.05 - 0.50 E9/L    Basophils Absolute 0.03 0.00 - 0.20 Z4/S   Basic Metabolic Panel w/ Reflex to MG    Collection Time: 05/02/22  5:57 PM   Result Value Ref Range    Sodium 137 132 - 146 mmol/L    Potassium reflex Magnesium 3.6 3.5 - 5.0 mmol/L    Chloride 100 98 - 107 mmol/L    CO2 26 22 - 29 mmol/L    Anion Gap 11 7 - 16 mmol/L    Glucose 92 74 - 99 mg/dL    BUN 9 6 - 23 mg/dL    CREATININE 2.5 (H) 0.5 - 1.0 mg/dL    GFR Non-African American 23 >=60 mL/min/1.73    GFR African American 23     Calcium 8.8 8.6 - 10.2 mg/dL   Troponin    Collection Time: 05/02/22  5:57 PM   Result Value Ref Range    Troponin, High Sensitivity 323 (H) 0 - 9 ng/L   EKG 12 Lead    Collection Time: 05/02/22  6:52 PM   Result Value Ref Range    Ventricular Rate 97 BPM    Atrial Rate 91 BPM    QRS Duration 102 ms    Q-T Interval 354 ms    QTc Calculation (Bazett) 449 ms    R Axis 13 degrees    T Axis -147 degrees   APTT    Collection Time: 05/02/22  8:36 PM   Result Value Ref Range    aPTT 42.9 (H) 24.5 - 35.1 sec   SPECIMEN REJECTION    Collection Time: 05/02/22  9:10 PM   Result Value Ref Range    Rejected Test TROP     Reason for Rejection see below    Troponin    Collection Time: 05/02/22  9:39 PM   Result Value Ref Range    Troponin, High Sensitivity 535 (H) 0 - 9 ng/L       Imaging  ECHO Transesophageal    Result Date: 4/19/2022  Transesophageal Echocardiography Report (KIARA)   Demographics   Patient Name       Mari Justin  Gender               Female                     G   Medical Record     [id number]       Room Number          4506  Number   Account #          [de-identified]      Procedure Date       04/19/2022   Corporate ID                      Ordering Physician   Hilary Solo MD   Accession Number   [id number]     Referring Physician   Date of Birth      1955     Sonographer          Joseph Miramontes [facility]   Age                78 year(s)     Interpreting         Lily Marie MD                                    Physician                                     Any Other  Procedure Type of Study   KIARA procedure:Transesophageal Echo (KIARA), Doppler Echocardiography, Color  Flow Velocity Mapping. Procedure Date Date: 04/19/2022 Start: 12:32 PM Study Location: Portable Technical Quality: Good visualization Indications:Mitral Valve Disorder.  Patient Status: Routine Height: 70 inches Weight: 175 pounds BSA: 1.97 m^2 BMI: 25.11 kg/m^2 Rhythm: Within normal limits KIARA Performed By: the attending and the sonographer  Type of Anesthesia: Moderate sedation  Allergies   - Other drug:(Lasix). Findings   Left Ventricle  Normal left ventricular size. LV systolic function mildly reduced. Ejection fraction is visually estimated at 40-45%. Right Ventricle  Normal right ventricular size and function. Left Atrium  Dilated left atrium. No evidence of thrombus within left atrium or appendage. The interatrial septum appears intact. Right Atrium  Normal right atrial size. No evidence of thrombus or mass in the right atrium. Mitral Valve  Moderate mitral annular calcification. Valvular and subvalvular calcification. No evidence of mitral valve stenosis. Mild mitral regurgitation. No definitive evidence of papillary fibroelastoma. Tricuspid Valve  The tricuspid valve appears structurally normal.  Mild tricuspid regurgitation. Aortic Valve  Aortic valve leaflets are mildly calcified. The aortic valve is trileaflet. No hemodynamically significant aortic stenosis is present. No evidence of aortic valve regurgitation. No definitive PFE. Pulmonic Valve  Pulmonic valve is structurally normal.  No evidence of any pulmonic regurgitation. Pericardial Effusion  No evidence of pericardial effusion. Aorta  Minimal atherosclerotic disease of the descending thoracic aorta. Miscellaneous  LUPV: Normal flow  LLPV: Normal flow  RUPV: Normal flow   Conclusions   Summary  LV systolic function mildly reduced. Ejection fraction is visually estimated at 40-45%. Moderate mitral annular calcification. Valvular and subvalvular calcification. Mild mitral regurgitation. No definitive evidence of papillary fibroelastoma.    Signature   ----------------------------------------------------------------  Electronically signed by Breann Varma MD(Interpreting  physician) on 04/19/2022 06:29 PM  ----------------------------------------------------------------  Doppler Measurements & Calculations   MV Peak E-Wave: 0.93 m/s   MV Peak Gradient: 3.5 mmHg  MV Mean Gradient: 1.6 mmHg  MV Mean Velocity: 0.6 m/s  MV P1/2t: 80.2 msec  MVA by PHT:2.74 cm^2  [email or web address] [id number]    CT ABDOMEN PELVIS WO CONTRAST Additional Contrast? None    Result Date: 5/2/2022  EXAMINATION: CT OF THE ABDOMEN AND PELVIS WITHOUT CONTRAST 5/2/2022 5:15 pm TECHNIQUE: CT of the abdomen and pelvis was performed without the administration of intravenous contrast. Multiplanar reformatted images are provided for review. Dose modulation, iterative reconstruction, and/or weight based adjustment of the mA/kV was utilized to reduce the radiation dose to as low as reasonably achievable. COMPARISON: CT abdomen and pelvis, 11/07/2017. HISTORY: ORDERING SYSTEM PROVIDED HISTORY: abd pain TECHNOLOGIST PROVIDED HISTORY: Reason for exam:->abd pain Additional Contrast?->None Decision Support Exception - unselect if not a suspected or confirmed emergency medical condition->Emergency Medical Condition (MA) FINDINGS: Lower Chest: The heart is mildly enlarged. No pleural or pericardial effusion. Mild atelectasis in the dependent aspect of the left lower lobe. Organs: Liver: Unremarkable. Gallbladder: Surgically absent Pancreas: Unremarkable. Spleen:  Unremarkable. Adrenals: Stable nodularity in the adrenal glands, which the is likely due to the presence of adenomas. Kidneys: The kidneys are bilaterally atrophic with multiple cysts. No hydronephrosis. GI/Bowel: Moderate fecal retention is seen in the colon. No evidence of bowel wall thickening or obstruction. Mild-to-moderate distal colonic diverticulosis without diverticulitis. Normal appendix. Pelvis: The urinary bladder is unremarkable. Within limits of the CT technique, the uterus and the adnexa are grossly unremarkable.  Peritoneum/Retroperitoneum: There is prominent calcified atherosclerosis in the medium and small-caliber arteries, characteristic of Monckeberg medial calcific sclerosis, which is often seen in the setting of diabetes and/or chronic renal disease. Prominent calcified atherosclerosis also seen in the abdominal aorta. No evidence of aneurysm. No lymphadenopathy. No free air or free fluid is seen. Bones/Soft Tissues: Bones are diffusely sclerotic likely due to renal osteodystrophy. Status post decompressive laminectomies with posterior interbody fusion from L3-L5. Severe destructive endplate changes at K5-2 are grossly stable. There is progressive loss of height in the L2 vertebral body. There is stable grade 1-2 anterolisthesis of L2 over L3. There is interval development of erosive changes along the inferior endplate of the L1 vertebral body. Spinal stimulator in situ, terminating in the posterior epidural space at T8-9.     1. Erosive changes along the inferior endplate of the L1 vertebral body, which has developed in the interim since the previous CT from 11/07/2017. It may be degenerative, related to spondyloarthropathy of dialysis (related to accumulation of amyloid) or discitis/osteomyelitis. Consider further characterization with MRI. 2. Chronic destructive changes along the endplates at F8-0 with progressive loss of height in the L2 vertebral body. 3. Severe atherosclerosis, within appearance suggestive of Monckeberg medial calcific sclerosis, as may be seen with diabetes and end-stage renal disease. 4. Moderate fecal retention in the colon. No bowel wall thickening or evidence of obstruction. 5. Stable adrenal nodules, likely adenomas.  RECOMMENDATIONS: Unavailable     CT LUMBAR SPINE WO CONTRAST    Result Date: 5/2/2022  EXAMINATION: CT OF THE LUMBAR SPINE WITHOUT CONTRAST  5/2/2022 TECHNIQUE: CT of the lumbar spine was performed without the administration of intravenous contrast. Multiplanar reformatted images are provided for review. Adjustment of mA and/or kV according to patient size was utilized. Dose modulation, iterative reconstruction, and/or weight based adjustment of the mA/kV was utilized to reduce the radiation dose to as low as reasonably achievable. COMPARISON: CT lumbar spine 04/13/2022 HISTORY: ORDERING SYSTEM PROVIDED HISTORY: back pain TECHNOLOGIST PROVIDED HISTORY: Reason for exam:->back pain Decision Support Exception - unselect if not a suspected or confirmed emergency medical condition->Emergency Medical Condition (MA) FINDINGS: BONES/ALIGNMENT: Again seen are changes of posterior transpedicular fusion L3-L5. Again seen are severe erosive changes at the L1-2 and L2-3 endplates with height loss of these endplates again noted. There is stable associated grade 1 anterolisthesis of L2 on L3. Schmorl's nodes are noted at multiple levels. DEGENERATIVE CHANGES: Severe spinal stenosis again seen at L2-3 secondary to the spondylolisthesis and likely inflammatory change. Calcified disc bulge and inflammatory change at L1-2 result in likely moderate spinal stenosis. No high-grade spinal stenosis is seen at the fused levels, allowing for limitations from hardware related artifact. High-grade neural foraminal stenosis is suspected at L1-2 and L2-3 secondary to the inflammatory change. SOFT TISSUES/RETROPERITONEUM: Edematous bilateral psoas muscles. Paravertebral edema is suspected at the L1-2 and L2-3 levels. Bilateral renal cysts. Atrophic kidneys. Extensive atherosclerotic disease. Colonic diverticulosis. A generator pack is present in the right paraspinal tissues at the lumbosacral junction, with a catheter ascending in the paraspinal subcutaneous tissues superiorly beyond the field of view. Again seen are severe erosive changes at the L1-2 and L2-3 endplates, highly suggestive discitis-osteomyelitis.  Associated spondylolisthesis and inflammatory change again results in severe spinal stenosis at L2-3 and moderate spinal stenosis at L1-2. Further evaluation with contrast enhanced MRI of the lumbar spine is recommended. CT LUMBAR SPINE WO CONTRAST    Result Date: 4/13/2022  EXAMINATION: CT OF THE LUMBAR SPINE WITHOUT CONTRAST  4/13/2022 TECHNIQUE: CT of the lumbar spine was performed without the administration of intravenous contrast. Multiplanar reformatted images are provided for review. Adjustment of mA and/or kV according to patient size was utilized. Dose modulation, iterative reconstruction, and/or weight based adjustment of the mA/kV was utilized to reduce the radiation dose to as low as reasonably achievable. COMPARISON: CT lumbar spine March 8, 2022 HISTORY: ORDERING SYSTEM PROVIDED HISTORY: pain, no trauma TECHNOLOGIST PROVIDED HISTORY: Reason for exam:->pain, no trauma Decision Support Exception - unselect if not a suspected or confirmed emergency medical condition->Emergency Medical Condition (MA) What reading provider will be dictating this exam?->CRC FINDINGS: BONES/ALIGNMENT: Demonstrated is posterior fixation hardware seen from L3-L5 with decompressive laminectomy. No evidence of hardware complications. L1-L2: There is soft tissue edema adjacent to the L1 disc. There is new loss of height at the L1 inferior endplate extending into the vertebral body when compared to prior exam.  There is pressure free hypertrophy of the ligamentum flavum and protrusion of the intervertebral disc resulting in moderate spinal canal stenosis. Again demonstrated is moderate to severe neural foraminal stenosis. L2-L3: Again demonstrated is severe loss of height at L2 with anterolisthesis L2 on L3 measuring approximately 1.4 cm. Severe spinal canal stenosis again demonstrated at L2-L3. Severe bilateral neural foraminal stenosis. L3-L4: Status post laminectomy and posterior fixation. Limited evaluation due to hardware streak artifacts. Again demonstrated is severe loss of disc height.   Mild neural foraminal stenosis. L4-L5: Status post laminectomy and posterior fixation. Limited evaluation due to hardware streak artifacts. Again demonstrated is severe loss of disc height. Mild neural foraminal stenosis. L5-S1: Mild to moderate moderate to severe loss of disc height with vacuum phenomenon. There is mild spinal canal stenosis. Facet hypertrophy identified. Moderate right and severe left neural foraminal stenosis. SOFT TISSUES/RETROPERITONEUM: No paraspinal mass is seen. New loss of height of L1 inferior endplate with increased soft tissue inflammation. Finding is suspicious for possible osteomyelitis. Further evaluation with lumbar spine MRI may be considered. XR CHEST PORTABLE    Result Date: 5/2/2022  EXAMINATION: ONE XRAY VIEW OF THE CHEST 5/2/2022 4:25 pm COMPARISON: Chest CT 7 December 2021 and chest radiograph 19 May 2021 HISTORY: ORDERING SYSTEM PROVIDED HISTORY: back/ abd pain TECHNOLOGIST PROVIDED HISTORY: Reason for exam:->back/ abd pain FINDINGS: Unchanged implanted central venous catheter on the right and thoracic spinal neurostimulator. Airspace disease has resolved. I suspect that this represented cardiogenic edema. Heart is enlarged. Pulmonary vascularity is normal.  Neither costophrenic angle is blunted. Interval resolution of suspected cardiogenic edema. Cardiomegaly. See above. CTA PULMONARY W CONTRAST    Result Date: 5/2/2022  EXAMINATION: CTA OF THE CHEST 5/2/2022 6:42 pm TECHNIQUE: CTA of the chest was performed after the administration of intravenous contrast.  Multiplanar reformatted images are provided for review. MIP images are provided for review. Dose modulation, iterative reconstruction, and/or weight based adjustment of the mA/kV was utilized to reduce the radiation dose to as low as reasonably achievable. COMPARISON: Unenhanced chest CT study from December 7, 2021. Lumbar spine CT study from May 2, 2022.  HISTORY: ORDERING SYSTEM PROVIDED HISTORY: rule out pe back pain TECHNOLOGIST PROVIDED HISTORY: Reason for exam:->rule out pe back pain Decision Support Exception - unselect if not a suspected or confirmed emergency medical condition->Emergency Medical Condition (MA) FINDINGS: Pulmonary Arteries: Pulmonary arteries are adequately opacified for evaluation. No evidence of intraluminal filling defect to suggest pulmonary embolism. Main pulmonary artery is normal in caliber. Mediastinum: The heart is enlarged. A right jugular vein chest port catheter ends in the distal SVC. [place] The thoracic aorta and proximal great vessels are patent and of normal caliber. No pericardial effusion. Diffuse left breast skin thickening with several chest wall collateral vessels again noted. There are a few mildly prominent supraclavicular and upper mediastinal lymph nodes noted, not significantly changed, nor is a left lower pole thyroid nodule. Lungs/pleura: The trachea and mainstem bronchi are widely patent. Minimal atelectasis is present at the left lung base. The lungs are otherwise clear. No discrete pulmonary nodule or mass. No pleural effusion or pneumothorax. Soft Tissues/Bones: Degenerative changes are present throughout the thoracic spine. Incompletely healed fracture of the upper sternal body, unchanged. There is disc and endplate destruction of the upper lumbar spine which is seen to better extent on the lumbar spine CT study. Upper Abdomen: The native kidneys are again seen to be somewhat atrophic. Extensive arterial vascular calcifications are noted. Diffuse bilateral adrenal gland enlargement, unchanged. The gallbladder is surgically absent     No evidence of pulmonary embolism or acute thoracic aortic abnormality. Cardiomegaly. Minimal left basilar atelectasis. Otherwise, clear lungs. Destructive process of the upper lumbar spine at the L1-L2 level, only partially imaged. This is seen to better extent on the lumbar spine MRI of the same day.      FLUORO FOR SURGICAL PROCEDURES    Result Date: 4/18/2022  EXAMINATION: SPOT FLUOROSCOPIC IMAGES 4/18/2022 12:23 pm TECHNIQUE: Fluoroscopy was provided by the radiology department for procedure. Radiologist was not present during examination. FLUOROSCOPY DOSE AND TYPE OR TIME AND EXPOSURES: Fluoroscopy time equals 26.9 seconds. Total dose equals 21.78 mGy COMPARISON: None HISTORY: ORDERING SYSTEM PROVIDED HISTORY: lumbar biopsies TECHNOLOGIST PROVIDED HISTORY: Reason for exam:->lumbar biopsies What reading provider will be dictating this exam?->CRC Intraprocedural imaging. FINDINGS: 2 spot images of the spine were obtained. Intraprocedural fluoroscopic spot images as above. See separate procedure report for more information. Assessment and Plan  Patient is a 78 y.o. female who presented with back pain. The active problem list is as follows:    · NSTEMI- Interventional cardiology consulted from ER for concerning EKG changes,  they advised no acute STEMI on the EKG however patient will need plan for PCI in the morning. On heparin drip. Repeat EKG. · Back pain with spinal stenosis and osteomyelitis of lumbar spine -Neurosurgery consultation. Pain control.   On vancomycin. · CAD  · End-stage renal disease on hemodialysis- Monday Wednesday Friday, consult nephrology for renal replacement therapy. 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 · DVT prophylaxis. · Please see orders for further management and care.         Sami Arredondo DO    11:00 PM  5/2/2022

## 2022-05-03 NOTE — PROGRESS NOTES
Contacted attending re: Shobha Rainey no longer drawing blood. Requested order for Activase and heparin flushes.     Catina Hardy RN 05/03/22 1:59 PM

## 2022-05-03 NOTE — PROGRESS NOTES
During report, previous RN stated that patients port would not draw blood so the Aptt had not been drawn since 0400. This RN went in and assessed IV and the port needle was not in correctly. Port de-accessed and IV team perfect served to access port.

## 2022-05-03 NOTE — CONSULTS
Patient seen and examined. Chart, labs, imaging studies, EKG and rhythm strips reviewed. Full consult to follow. We were asked to see the patient for NSTEMI     IMPRESSION:  1. NSTEMI  2. CAD with h/o LAD and RCA stents 5/2021 and with cath 12/21 showing patent LAD stent, hi grade  focal in stent restenosis of the RCA and known  of OM2. Was supposed to have CABG x 2 with resection of the MV fibroelastoma nad possible mitral valve repair  3. Ischemic cardiomyopathy   4. 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 Insulin requiring DM with neuropathy, retinopathy and nephropathy  6. ESRD on hemodialysis  7. Borderline low BP. H/o HTN  8. HLD  9. Jehovah Witness  10. Bladder carcinoma s/p chemotherapy  11. Osteomyelitis of lumbar spine L1-L3 s/p bone marrow bx and fusion 4/19/2022. H/o  DDD lumbar spine with h/o lumbar laminectomy and spinal cord stimulator with intractable back pain  12. S/p amputation of left hand distal middle finger for osteomyelitis  13. Chronic anemia       PLAN:   1. Add Aspirin 81mg daily   2. Continue Heparin   3. Rest of home cardiac medications the same   4. Will tentatively plan for cardiac catheterization  +/-  PCI tomorrow 5/4/2022   5.  Will follow     Electronically signed by Sadaf Koenig MD on 5/3/2022 at 10:48 AM

## 2022-05-03 NOTE — CARE COORDINATION
Care Coordination: Met with pt at bedside to discuss transition of care upon discharge, states she is at [facility] for Skilled that may turn into long term, she states her sister Dyan Nissen called [facility] today to make sure they know she is returning. I had also received a call from Kasi Chan at [facility] and pt is skilled, will need precert and covid test on day of dc. I have obtained therapy orders.  Will need transport envelope    Ronn Cuellar

## 2022-05-03 NOTE — CONSULTS
CHIEF COMPLAINT:  Pain in the back radiating down to the lower  extremities.     HISTORY OF PRESENT ILLNESS:  This 76-year-old female who is known to me. She had undergone posterior lumbar interbody fusion L4-5 and L5-S1 and  subsequently had implantation of the spinal cord stimulator. She has  not used spinal cord stimulator for a while because it was not working  and not giving enough relief. The pain had been severe in the back and  radiating down to both lower extremities. Pain is more pronounced in  the back as compared to the legs and more so in the left lower extremity  as compared to the right. She had a CT scan of the lumbar spine  performed in 03/2022 which raised the possibility of spondylolisthesis  grade 2 at the level of L2-3. She was supposed to have the posterior  lumbar interbody fusion at that level in 03/2022 but she was not cleared  medically and was supposed to have cardiac surgery prior to having the  back surgery. She was given epidural nerve block. These did give her  some relief of pain. She was sent to [facility] facility. Now  she returns to the emergency room because of the severe back pain and is  unable to ambulate. The CT scan of the lumbar spine revealed new loss  of height of L1 inferior endplate with increased soft tissue  inflammation suspicious for possible osteomyelitis. Percutaneous Biopsy of L1 L2 on 4/19/22  revealed osteomyelitis. Also worsening of L2-L3 subluxation. The patient has moderately severe cardiac issues.  She is being treated by ID with antibiotics       PAST MEDICAL HISTORY:  Acute infection of the back, osteomyelitis,  arthritis, breast cancer, coronary artery disease, carpal tunnel  syndrome, chronic diastolic CHF, CKD, diabetes, retinopathy, glaucoma,  hemodialysis patient, hyperkalemia, hyperlipidemia, hypertension,  insulin-dependent diabetes mellitus, and the patient is Jehovah's  Witness and ventricular hypertrophy, vitreous hemorrhage.     PAST SURGICAL HISTORY:  Amputation of the right great toe, ankle  surgery, cardiac catheterization, carpal tunnel release, cataract  removal, cholecystectomy, colonoscopy, cystoscopy, dialysis fistula  creation, echo complete, finger amputation, mastectomy and percutaneous  placement of spinal cord stimulator lead and insertion of spinal cord  stimulator surgical leads, spinal cord decompression, transesophageal  echocardiogram, tunneled venous catheter placement and vitrectomy. Biopsy L1 & L2     FAMILY HISTORY:  Hypertension, heart disease, and prostate cancer.     SOCIAL HISTORY:  Not a smoker. Does not use alcohol or street drugs.     PHYSICAL EXAMINATION:  She is a well-developed, well-nourished female in  no acute distress. She is alert, awake and oriented to time, place and  person. Speech is good. Memory is good. Cranial nerves are grossly  intact. Movement of cervical spine is fair. She has no weakness in the  upper extremities. Hand  is equal bilaterally. She does have  weakness of the left lower extremity with dorsiflexion being 3   and quadriceps probably a 3-4/5. She can lift her leg off the bed on the  left side and the right side. Sensations are relatively intact in the  upper part of the extremities while she has neuropathy in the lower  extremities     I reviewed various studies.     IMPRESSION:  Osteomyelitis of L1-2 and grade II  spondylolisthesis of L2-3 and status post lumbar interbody fusion L3-4  and L4-5 and multiple medical comorbidities & major cardiac issues.   Will get 2nd opinion from Dr Nam Kinsey.

## 2022-05-03 NOTE — ED NOTES
Staff at [address] [place] [address] updated on plan to transfer patient to [address] [place] [address] for NSTEMI.        Grace Ayoub RN  05/02/22 2019

## 2022-05-03 NOTE — PROGRESS NOTES
Cardiology consult sent, Dr Greg Browne on call. Troponins included in message    Dr Greg Browne notified troponin 1388, pt. No complaint of chest pain, and has Heparin drip.

## 2022-05-03 NOTE — PROGRESS NOTES
Hospitalist Progress Note      PCP: Buck Michelle MD    Date of Admission: 5/2/2022    Hospital Course:   \" 78 y.o. female who past medical history that includes lumbar osteomyelitis, coronary artery disease, ESRD, anemia, hypertension, hyperlipidemia, diabetes, history of bladder carcinoma presented with back pain and abdominal pain for several weeks and is worsening over the past few days. She was recently admitted due to osteomyelitis. She was recently discharged to rehab  on vancomycin. In the ER, there was concern for STEMI on EKG. Interventional cardiology was consulted from the ER and they advised no acute STEMI on EKG however they will plan to do a PCI in the morning. She was placed on heparin drip. He was transferred to see Rod Shannon for continued work-up. ER also spoke with neurosurgeon, Dr. Howard Monday for continued signs of osteomyelitis. In ER, routine labwork was performed which revealed creatinine 2.5, troponin 323->535. EKG revealed atrial fibrillationST elevation noted in leads aVR ST depressions noted in leads V4 through V6 as well as in leads I to, ST elevation in leads V1. Repeat EKG. The patient received heparin drip in the emergency room and was admitted under the care of [facility] physicians. \"       Subjective:    She complains of back pain. She has no chest pain.     Medications:  Reviewed    Infusion Medications    sodium chloride      dextrose      heparin (PORCINE) Infusion 9 Units/kg/hr (05/03/22 0542)     Scheduled Medications    sodium chloride flush  5-40 mL IntraVENous 2 times per day    allopurinol  100 mg Oral Daily    atorvastatin  80 mg Oral Nightly    b complex-C-folic acid  1 mg Oral Nightly    bumetanide  2 mg Oral Once per day on Sun Tue Thu    gabapentin  200 mg Oral TID    hydrALAZINE  10 mg Oral BID    insulin glargine-yfgn  5 Units SubCUTAneous Nightly    isosorbide mononitrate  30 mg Oral Daily    lanthanum  1,000 mg Oral TID WC    latanoprost  1 drop Right Eye Nightly    metoprolol succinate  25 mg Oral Daily    pantoprazole  40 mg Oral QAM AC    brimonidine  1 drop Right Eye BID    And    timolol  1 drop Right Eye BID    ticagrelor  90 mg Oral BID    vancomycin (VANCOCIN) intermittent dosing (placeholder)   Other RX Placeholder    aspirin  81 mg Oral Daily    [START ON 5/4/2022] epoetin fani-epbx  2,000 Units SubCUTAneous Q MWF     PRN Meds: sodium chloride flush, sodium chloride, ondansetron **OR** ondansetron, polyethylene glycol, acetaminophen **OR** acetaminophen, lidocaine-prilocaine, oxyCODONE-acetaminophen, fentanNYL, glucose, dextrose, glucagon (rDNA), dextrose, heparin (porcine), heparin (porcine)    No intake or output data in the 24 hours ending 05/03/22 1358    Physical Exam Performed:    BP (!) 102/51   Pulse 63   Temp 98 °F (36.7 °C) (Temporal)   Resp 18   Ht 5' 10\" (1.778 m)   Wt 170 lb (77.1 kg)   SpO2 97%   BMI 24.39 kg/m²     General appearance: No apparent distress, appears stated age and cooperative. HEENT: Pupils equal, round, and reactive to light. Conjunctivae/corneas clear. Neck: Supple, with full range of motion. R port-a-cath  Respiratory:  Normal respiratory effort. Clear to auscultation, bilaterally without Rales/Wheezes/Rhonchi. Cardiovascular: Regular rate and rhythm with normal S1/S2 without murmurs, rubs or gallops. Abdomen: Soft, non-tender, non-distended with normal bowel sounds. Musculoskeletal: No clubbing, cyanosis or edema bilaterally. Left UE AVF  Skin: Skin color, texture, turgor normal.  No rashes or lesions. Neurologic:  Neurovascularly intact without any focal sensory/motor deficits.  Cranial nerves: II-XII intact, grossly non-focal.  Psychiatric: Alert and oriented, thought content appropriate, normal insight      Labs:   Recent Labs     05/02/22  1757 05/03/22  0400   WBC 6.1 5.4   HGB 9.1* 8.3*   HCT 30.9* 28.4*    238     Recent Labs     05/02/22  1757      K 3.6    CO2 26   BUN 9   CREATININE 2.5*   CALCIUM 8.8     No results for input(s): AST, ALT, BILIDIR, BILITOT, ALKPHOS in the last 72 hours. No results for input(s): INR in the last 72 hours. No results for input(s): Rigby Pace in the last 72 hours. Urinalysis:      Lab Results   Component Value Date    NITRU Negative 11/07/2017    WBCUA 1-3 11/07/2017    WBCUA NONE 08/27/2011    BACTERIA MODERATE 11/07/2017    RBCUA 0-1 11/07/2017    RBCUA 1-3 02/13/2013    BLOODU Negative 11/07/2017    SPECGRAV 1.025 11/07/2017    GLUCOSEU Negative 11/07/2017    GLUCOSEU NEGATIVE 08/27/2011       Radiology:  No orders to display           Assessment/Plan:    Active Hospital Problems    Diagnosis     Moderate protein-calorie malnutrition ([facility] [address] [E44.0]      Priority: Medium    NSTEMI (non-ST elevated myocardial infarction) ([facility] [address] [I21.4]      Priority: Medium       NSTEMI  -Cardiology consulted. Plan for cardiac cath tomorrow.  -Continue heparin drip  -Continue aspirin    Osteomyelitis of lumbar spine   -Neurosurgery consultation.   -Pain control.    -Continue vancomycin with HD    CAD  -As above    End-stage renal disease on hemodialysis- Monday Wednesday Friday,   -Consulted nephrology. Continue dialysis. 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 Questions and concerns were addressed. DVT Prophylaxis: Heparin drip  Diet: ADULT DIET; Regular; Low Sodium (2 gm);  Low Potassium (Less than 3000 mg/day); 1200 ml  Diet NPO Exceptions are: Sips of Water with Meds  ADULT ORAL NUTRITION SUPPLEMENT; Lunch; Renal Oral Supplement  ADULT ORAL NUTRITION SUPPLEMENT; Breakfast, Dinner;  Wound Healing Oral Supplement  Code Status: Full Code    PT/OT Eval Status: As needed    Dispo -Home in 1 to 2 days    Shani Ferrari MD

## 2022-05-04 LAB
ALBUMIN SERPL-MCNC: 2.8 G/DL (ref 3.5–5.2)
ALP BLD-CCNC: 124 U/L (ref 35–104)
ALT SERPL-CCNC: 12 U/L (ref 0–32)
ANION GAP SERPL CALCULATED.3IONS-SCNC: 10 MMOL/L (ref 7–16)
APTT: 208 SEC (ref 24.5–35.1)
APTT: >240 SEC (ref 24.5–35.1)
AST SERPL-CCNC: 86 U/L (ref 0–31)
BILIRUB SERPL-MCNC: 0.3 MG/DL (ref 0–1.2)
BUN BLDV-MCNC: 19 MG/DL (ref 6–23)
CALCIUM SERPL-MCNC: 8.5 MG/DL (ref 8.6–10.2)
CHLORIDE BLD-SCNC: 98 MMOL/L (ref 98–107)
CHOLESTEROL, TOTAL: 103 MG/DL (ref 0–199)
CO2: 28 MMOL/L (ref 22–29)
CREAT SERPL-MCNC: 4.4 MG/DL (ref 0.5–1)
GFR AFRICAN AMERICAN: 12
GFR NON-AFRICAN AMERICAN: 12 ML/MIN/1.73
GLUCOSE BLD-MCNC: 56 MG/DL (ref 74–99)
HBA1C MFR BLD: 5.1 % (ref 4–5.6)
HCT VFR BLD CALC: 30.3 % (ref 34–48)
HDLC SERPL-MCNC: 42 MG/DL
HEMOGLOBIN: 8.9 G/DL (ref 11.5–15.5)
LDL CHOLESTEROL CALCULATED: 48 MG/DL (ref 0–99)
MAGNESIUM: 2.7 MG/DL (ref 1.6–2.6)
MCH RBC QN AUTO: 27.2 PG (ref 26–35)
MCHC RBC AUTO-ENTMCNC: 29.4 % (ref 32–34.5)
MCV RBC AUTO: 92.7 FL (ref 80–99.9)
METER GLUCOSE: 47 MG/DL (ref 74–99)
METER GLUCOSE: 94 MG/DL (ref 74–99)
PDW BLD-RTO: 18.3 FL (ref 11.5–15)
PHOSPHORUS: 3.2 MG/DL (ref 2.5–4.5)
PLATELET # BLD: 221 E9/L (ref 130–450)
PMV BLD AUTO: 10.1 FL (ref 7–12)
POTASSIUM SERPL-SCNC: 4 MMOL/L (ref 3.5–5)
RBC # BLD: 3.27 E12/L (ref 3.5–5.5)
SODIUM BLD-SCNC: 136 MMOL/L (ref 132–146)
TOTAL PROTEIN: 5.8 G/DL (ref 6.4–8.3)
TRIGL SERPL-MCNC: 66 MG/DL (ref 0–149)
TSH SERPL DL<=0.05 MIU/L-ACNC: 3.12 UIU/ML (ref 0.27–4.2)
VANCOMYCIN RANDOM: 18.8 MCG/ML (ref 5–40)
VLDLC SERPL CALC-MCNC: 13 MG/DL
WBC # BLD: 4.4 E9/L (ref 4.5–11.5)

## 2022-05-04 PROCEDURE — 83735 ASSAY OF MAGNESIUM: CPT

## 2022-05-04 PROCEDURE — 2580000003 HC RX 258: Performed by: INTERNAL MEDICINE

## 2022-05-04 PROCEDURE — 2140000000 HC CCU INTERMEDIATE R&B

## 2022-05-04 PROCEDURE — 99222 1ST HOSP IP/OBS MODERATE 55: CPT | Performed by: NEUROLOGICAL SURGERY

## 2022-05-04 PROCEDURE — 83036 HEMOGLOBIN GLYCOSYLATED A1C: CPT

## 2022-05-04 PROCEDURE — 80202 ASSAY OF VANCOMYCIN: CPT

## 2022-05-04 PROCEDURE — 6370000000 HC RX 637 (ALT 250 FOR IP): Performed by: INTERNAL MEDICINE

## 2022-05-04 PROCEDURE — 85730 THROMBOPLASTIN TIME PARTIAL: CPT

## 2022-05-04 PROCEDURE — 5A1D70Z PERFORMANCE OF URINARY FILTRATION, INTERMITTENT, LESS THAN 6 HOURS PER DAY: ICD-10-PCS | Performed by: INTERNAL MEDICINE

## 2022-05-04 PROCEDURE — 84100 ASSAY OF PHOSPHORUS: CPT

## 2022-05-04 PROCEDURE — 36415 COLL VENOUS BLD VENIPUNCTURE: CPT

## 2022-05-04 PROCEDURE — 99233 SBSQ HOSP IP/OBS HIGH 50: CPT | Performed by: INTERNAL MEDICINE

## 2022-05-04 PROCEDURE — 6360000002 HC RX W HCPCS: Performed by: INTERNAL MEDICINE

## 2022-05-04 PROCEDURE — 2500000003 HC RX 250 WO HCPCS: Performed by: INTERNAL MEDICINE

## 2022-05-04 PROCEDURE — 85027 COMPLETE CBC AUTOMATED: CPT

## 2022-05-04 PROCEDURE — 90935 HEMODIALYSIS ONE EVALUATION: CPT

## 2022-05-04 PROCEDURE — 84443 ASSAY THYROID STIM HORMONE: CPT

## 2022-05-04 PROCEDURE — 82962 GLUCOSE BLOOD TEST: CPT

## 2022-05-04 PROCEDURE — 80053 COMPREHEN METABOLIC PANEL: CPT

## 2022-05-04 PROCEDURE — 80061 LIPID PANEL: CPT

## 2022-05-04 RX ADMIN — Medication 6 UNITS/KG/HR: at 09:35

## 2022-05-04 RX ADMIN — DEXTROSE MONOHYDRATE 12.5 G: 25 INJECTION, SOLUTION INTRAVENOUS at 20:03

## 2022-05-04 RX ADMIN — TICAGRELOR 90 MG: 90 TABLET ORAL at 22:37

## 2022-05-04 RX ADMIN — ATORVASTATIN CALCIUM 80 MG: 80 TABLET, FILM COATED ORAL at 22:36

## 2022-05-04 RX ADMIN — Medication 9 UNITS/KG/HR: at 20:12

## 2022-05-04 RX ADMIN — PANTOPRAZOLE SODIUM 40 MG: 40 TABLET, DELAYED RELEASE ORAL at 07:01

## 2022-05-04 RX ADMIN — OXYCODONE AND ACETAMINOPHEN 1 TABLET: 5; 325 TABLET ORAL at 02:54

## 2022-05-04 RX ADMIN — TICAGRELOR 90 MG: 90 TABLET ORAL at 16:21

## 2022-05-04 RX ADMIN — NEPHROCAP 1 MG: 1 CAP ORAL at 22:40

## 2022-05-04 RX ADMIN — ASPIRIN 81 MG: 81 TABLET, COATED ORAL at 16:20

## 2022-05-04 RX ADMIN — OXYCODONE AND ACETAMINOPHEN 1 TABLET: 5; 325 TABLET ORAL at 22:37

## 2022-05-04 RX ADMIN — SODIUM CHLORIDE, PRESERVATIVE FREE 10 ML: 5 INJECTION INTRAVENOUS at 20:04

## 2022-05-04 RX ADMIN — GABAPENTIN 200 MG: 100 CAPSULE ORAL at 22:38

## 2022-05-04 ASSESSMENT — PAIN DESCRIPTION - DESCRIPTORS: DESCRIPTORS: ACHING;SHARP

## 2022-05-04 ASSESSMENT — PAIN DESCRIPTION - ORIENTATION: ORIENTATION: LOWER

## 2022-05-04 ASSESSMENT — PAIN DESCRIPTION - PAIN TYPE: TYPE: CHRONIC PAIN

## 2022-05-04 ASSESSMENT — PAIN SCALES - GENERAL
PAINLEVEL_OUTOF10: 0
PAINLEVEL_OUTOF10: 8
PAINLEVEL_OUTOF10: 0
PAINLEVEL_OUTOF10: 0
PAINLEVEL_OUTOF10: 7
PAINLEVEL_OUTOF10: 0

## 2022-05-04 ASSESSMENT — PAIN DESCRIPTION - LOCATION: LOCATION: BACK

## 2022-05-04 ASSESSMENT — PAIN DESCRIPTION - FREQUENCY: FREQUENCY: CONTINUOUS

## 2022-05-04 ASSESSMENT — PAIN DESCRIPTION - ONSET: ONSET: ON-GOING

## 2022-05-04 NOTE — PROGRESS NOTES
Inpatient Cardiology Progress note     PATIENT IS BEING FOLLOWED FOR: NSTEMI    Alex Castro is a 78 y.o. female who follows with Dr. Turner Frame: back pain better with percocet. No CP or SOB  OBJECTIVE: Seen in dialysis / undergoing dialysis, laying almost flat in bed in no apparent distress     ROS:  Consist: Denies fevers, chills or night sweats  Heart: Denies chest pain, palpitations, lightheadedness, dizziness or syncope  Lungs: Denies SOB, cough, wheezing, orthopnea or PND  GI: Denies abdominal pain, vomiting or diarrhea    PHYSICAL EXAM:   BP (!) 105/30   Pulse 55   Temp 97.5 °F (36.4 °C)   Resp 18   Ht 5' 10\" (1.778 m)   Wt 168 lb 14 oz (76.6 kg)   SpO2 100%   BMI 24.23 kg/m²    B/P Range last 24 hours: Systolic (89CSA), PFC:418 , Min:92 , YPK:064    Diastolic (77YDL), DI:78, Min:23, Max:78    CONST: Well developed, well nourished AA female who appears stated age. Awake, alert and cooperative. No apparent distress  HEENT:   Head- Normocephalic, atraumatic   Eyes- Conjunctivae pink, anicteric  Throat- Oral mucosa pink and moist  Neck-  No stridor, trachea midline, no jugular venous distention. No carotid bruit  CHEST: Chest symmetrical and non-tender to palpation. No accessory muscle use or intercostal retractions  RESPIRATORY:  Lung sounds - clear throughout fields   CARDIOVASCULAR:     Heart Inspection- shows no noted pulsations  Heart Palpation- no heaves or thrills; PMI is non-displaced   Heart Ausculation- Regular rate and rhythm, no murmur. No s3, s4 or rub   PV: No lower extremity edema. No varicosities. Pedal pulses palpable, no clubbing or cyanosis   ABDOMEN: Soft, non-tender to light palpation. Bowel sounds present. No palpable masses no organomegaly; no abdominal bruit  MS: Good muscle strength and tone. No atrophy or abnormal movements. : Deferred  SKIN: Warm and dry no statis dermatitis or ulcers   NEURO / PSYCH: Oriented to person, place and time.  Speech clear and appropriate. Follows all commands.  Pleasant affect     No intake or output data in the 24 hours ending 05/04/22 0915    Weight:   Wt Readings from Last 3 Encounters:   05/04/22 168 lb 14 oz (76.6 kg)   05/02/22 163 lb (73.9 kg)   04/22/22 178 lb 2.1 oz (80.8 kg)     Current Inpatient Medications:   vancomycin  750 mg IntraVENous Once    sodium chloride flush  5-40 mL IntraVENous 2 times per day    allopurinol  100 mg Oral Daily    atorvastatin  80 mg Oral Nightly    b complex-C-folic acid  1 mg Oral Nightly    bumetanide  2 mg Oral Once per day on Sun Tue Thu    gabapentin  200 mg Oral TID    hydrALAZINE  10 mg Oral BID    insulin glargine-yfgn  5 Units SubCUTAneous Nightly    isosorbide mononitrate  30 mg Oral Daily    lanthanum  1,000 mg Oral TID WC    latanoprost  1 drop Right Eye Nightly    metoprolol succinate  25 mg Oral Daily    pantoprazole  40 mg Oral QAM AC    brimonidine  1 drop Right Eye BID    And    timolol  1 drop Right Eye BID    ticagrelor  90 mg Oral BID    vancomycin (VANCOCIN) intermittent dosing (placeholder)   Other RX Placeholder    aspirin  81 mg Oral Daily    epoetin fani-epbx  2,000 Units SubCUTAneous Q MWF    alteplase  2 mg IntraCATHeter Once       IV Infusions (if any):   sodium chloride      dextrose      heparin (PORCINE) Infusion Stopped (05/04/22 0835)       DIAGNOSTIC/ LABORATORY DATA:  Labs:   CBC:   Recent Labs     05/03/22  0400 05/04/22  0710   WBC 5.4 4.4*   HGB 8.3* 8.9*   HCT 28.4* 30.3*    221     BMP:   Recent Labs     05/03/22  1307 05/04/22  0710    136   K 3.8 4.0   CO2 24 28   BUN 14 19   CREATININE 3.5* 4.4*   LABGLOM 16 12   CALCIUM 8.9 8.5*     Mag:   Recent Labs     05/03/22  0400 05/04/22  0710   MG 2.4 2.7*     Phos:   Recent Labs     05/04/22  0710   PHOS 3.2     TFT:   Lab Results   Component Value Date    TSH 3.120 05/04/2022    W5HTDWM 5.0 12/24/2012    T4FREE 1.58 05/19/2021      HgA1c:   Lab Results   Component Value Date LABA1C 5.1 05/04/2022     APTT:  Recent Labs     05/03/22  1307 05/04/22  0710   APTT 52.1* 208.0*     CARDIAC ENZYMES:  Recent Labs     05/02/22  1757 05/02/22  2139 05/03/22  0400   TROPHS 323* 535* 1,388*     FASTING LIPID PANEL:  Lab Results   Component Value Date    CHOL 103 05/04/2022    HDL 42 05/04/2022    LDLCALC 48 05/04/2022    TRIG 66 05/04/2022     LIVER PROFILE:  Recent Labs     05/04/22  0710   AST 86*   ALT 12   LABALBU 2.8*       CXR 5/2/22:   FINDINGS:  Unchanged implanted central venous catheter on the right and thoracic spinal neurostimulator.  Airspace disease has resolved.  I suspect that this represented cardiogenic edema.  Heart is enlarged.  Pulmonary vascularity is normal.  Neither costophrenic angle is blunted. CTA pulmonary 5/2/22:  No evidence of pulmonary embolism or acute thoracic aortic abnormality.   Cardiomegaly.  Minimal left basilar atelectasis.  Otherwise, clear lungs.   Destructive process of the upper lumbar spine at the L1-L2 level, only partially imaged.  This is seen to better extent on the lumbar spine MRI of the same day. CT lumbar spine 5/2/22:  Again seen are severe erosive changes at the L1-2 and L2-3 endplates, highly suggestive discitis-osteomyelitis.   Associated spondylolisthesis and inflammatory change again results in severe spinal stenosis at L2-3 and moderate spinal stenosis at L1-2.   Further evaluation with contrast enhanced MRI of the lumbar spine is recommended. 12 lead EKG 5/3/22: Sinus rhythm with premature atrial complexes with aberrant conduction. Cannot rule out Inferior infarct , age undetermined. Cannot rule out Anterior infarct , age undetermined. ST & T wave abnormality, consider lateral ischemia      Telemetry: Sinus bradycardia        ASSESSMENT:   1. NSTEMI  2.  CAD with h/o LAD and RCA stents 5/2021 and with cath 12/21 showing patent LAD stent, hi grade  focal in stent restenosis of the RCA and known  of OM2.  Was supposed to have CABG x 2 with resection of the MV fibroelastoma nad possible mitral valve repair  3. 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 Insulin requiring DM with neuropathy, retinopathy and nephropathy  6. ESRD on hemodialysis  7. Borderline low BP. H/o HTN  8. HLD  9. Jehovah Witness  10. Bladder carcinoma s/p chemotherapy  11. Osteomyelitis of lumbar spine L1-L3 s/p bone marrow bx and fusion 4/19/2022. H/o  DDD lumbar spine with h/o lumbar laminectomy and spinal cord stimulator with intractable back pain  12. S/p amputation of left hand distal middle finger for osteomyelitis  13. Chronic anemia         PLAN:  1. Continue current cardiac medications  2. Cath +/- PCI today --> further recommendations to follow  3. Rest as per the primary service +/- other consultants  4.  Will follow    Electronically signed by Isha Abel MD on 5/4/2022 at 9:15 AM

## 2022-05-04 NOTE — FLOWSHEET NOTE
05/04/22 1250   Vital Signs   BP (!) 111/41   Temp 98.2 °F (36.8 °C)   Pulse 56   Resp 18   Weight 165 lb 5.5 oz (75 kg)   Weight Method Bed scale   Percent Weight Change -2.09   Pain Assessment   Pain Assessment None - Denies Pain   Post-Hemodialysis Assessment   Post-Treatment Procedures Blood returned; Access bleeding time < 10 minutes   Machine Disinfection Process Exterior Machine Disinfection   Rinseback Volume (ml) 300 ml   Total Liters Processed (l/min) 91 l/min   Dialyzer Clearance Heavily streaked   Duration of Treatment (minutes) 240 minutes   Hemodialysis Intake (ml) 300 ml   Hemodialysis Output (ml) 2200 ml   NET Removed (ml) 1900 ml   Tolerated Treatment Good   Patient Response to Treatment Tolerated tx well   Bilateral Breath Sounds Diminished

## 2022-05-04 NOTE — PROGRESS NOTES
Hospitalist Progress Note      PCP: Donal Llanes MD    Date of Admission: 5/2/2022    Hospital Course:   \" 78 y.o. female who past medical history that includes lumbar osteomyelitis, coronary artery disease, ESRD, anemia, hypertension, hyperlipidemia, diabetes, history of bladder carcinoma presented with back pain and abdominal pain for several weeks and is worsening over the past few days. She was recently admitted due to osteomyelitis. She was recently discharged to rehab  on vancomycin. In the ER, there was concern for STEMI on EKG. Interventional cardiology was consulted from the ER and they advised no acute STEMI on EKG however they will plan to do a PCI in the morning. She was placed on heparin drip. He was transferred to see Noe Meraz for continued work-up. ER also spoke with neurosurgeon, Dr. Leon Mcgregor for continued signs of osteomyelitis. In ER, routine labwork was performed which revealed creatinine 2.5, troponin 323->535. EKG revealed atrial fibrillationST elevation noted in leads aVR ST depressions noted in leads V4 through V6 as well as in leads I to, ST elevation in leads V1. Repeat EKG. The patient received heparin drip in the emergency room and was admitted under the care of [facility] physicians. \"       Subjective:    She complains of back pain. She has no chest pain.     Medications:  Reviewed    Infusion Medications    sodium chloride      dextrose      heparin (PORCINE) Infusion 6 Units/kg/hr (05/04/22 0935)     Scheduled Medications    vancomycin  1,000 mg IntraVENous Once    sodium chloride flush  5-40 mL IntraVENous 2 times per day    allopurinol  100 mg Oral Daily    atorvastatin  80 mg Oral Nightly    b complex-C-folic acid  1 mg Oral Nightly    bumetanide  2 mg Oral Once per day on Sun Tue Thu    gabapentin  200 mg Oral TID    hydrALAZINE  10 mg Oral BID    insulin glargine-yfgn  5 Units SubCUTAneous Nightly    isosorbide mononitrate  30 mg Oral Daily    lanthanum 1,000 mg Oral TID WC    latanoprost  1 drop Right Eye Nightly    metoprolol succinate  25 mg Oral Daily    pantoprazole  40 mg Oral QAM AC    brimonidine  1 drop Right Eye BID    And    timolol  1 drop Right Eye BID    ticagrelor  90 mg Oral BID    vancomycin (VANCOCIN) intermittent dosing (placeholder)   Other RX Placeholder    aspirin  81 mg Oral Daily    epoetin fani-epbx  2,000 Units SubCUTAneous Q MWF    alteplase  2 mg IntraCATHeter Once     PRN Meds: sodium chloride flush, sodium chloride, ondansetron **OR** ondansetron, polyethylene glycol, acetaminophen **OR** acetaminophen, lidocaine-prilocaine, oxyCODONE-acetaminophen, fentanNYL, glucose, dextrose, glucagon (rDNA), dextrose, heparin flush, heparin (porcine), heparin (porcine)      Intake/Output Summary (Last 24 hours) at 5/4/2022 1313  Last data filed at 5/4/2022 1250  Gross per 24 hour   Intake 300 ml   Output 2200 ml   Net -1900 ml       Physical Exam Performed:    BP (!) 111/41   Pulse 56   Temp 98.2 °F (36.8 °C)   Resp 18   Ht 5' 10\" (1.778 m)   Wt 165 lb 5.5 oz (75 kg)   SpO2 100%   BMI 23.72 kg/m²     General appearance: No apparent distress, appears stated age and cooperative. HEENT: Pupils equal, round, and reactive to light. Conjunctivae/corneas clear. Neck: Supple, with full range of motion. R port-a-cath  Respiratory:  Normal respiratory effort. Clear to auscultation, bilaterally without Rales/Wheezes/Rhonchi. Cardiovascular: Regular rate and rhythm with normal S1/S2 without murmurs, rubs or gallops. Abdomen: Soft, non-tender, non-distended with normal bowel sounds. Musculoskeletal: No clubbing, cyanosis or edema bilaterally. Left UE AVF  Skin: Skin color, texture, turgor normal.  No rashes or lesions. Neurologic:  Neurovascularly intact without any focal sensory/motor deficits.  Cranial nerves: II-XII intact, grossly non-focal.  Psychiatric: Alert and oriented, thought content appropriate, normal insight      Labs: Recent Labs     05/02/22  1757 05/03/22  0400 05/04/22  0710   WBC 6.1 5.4 4.4*   HGB 9.1* 8.3* 8.9*   HCT 30.9* 28.4* 30.3*    238 221     Recent Labs     05/02/22  1757 05/03/22  1307 05/04/22  0710    137 136   K 3.6 3.8 4.0    99 98   CO2 26 24 28   BUN 9 14 19   CREATININE 2.5* 3.5* 4.4*   CALCIUM 8.8 8.9 8.5*   PHOS  --   --  3.2     Recent Labs     05/04/22  0710   AST 86*   ALT 12   BILITOT 0.3   ALKPHOS 124*     No results for input(s): INR in the last 72 hours. No results for input(s): Joyice Harper in the last 72 hours. Urinalysis:      Lab Results   Component Value Date    NITRU Negative 11/07/2017    WBCUA 1-3 11/07/2017    WBCUA NONE 08/27/2011    BACTERIA MODERATE 11/07/2017    RBCUA 0-1 11/07/2017    RBCUA 1-3 02/13/2013    BLOODU Negative 11/07/2017    SPECGRAV 1.025 11/07/2017    GLUCOSEU Negative 11/07/2017    GLUCOSEU NEGATIVE 08/27/2011       Radiology:  No orders to display           Assessment/Plan:    Active Hospital Problems    Diagnosis     Moderate protein-calorie malnutrition ([facility] [address] [E44.0]      Priority: Medium    NSTEMI (non-ST elevated myocardial infarction) ([facility] [address] [I21.4]      Priority: Medium       NSTEMI  -Cardiology consulted. Plan for cardiac cath today.  -Continue heparin drip  -Continue aspirin    Osteomyelitis of lumbar spine L1-L2   Hx lumbar fusion  -Neurosurgery consultation. Recommend that patient benefit from a lumbar decompression and fusion once medically stable  -Pain control.    -Continue vancomycin with HD    CAD  -As above    End-stage renal disease on hemodialysis- Monday Wednesday Friday,   -Consulted nephrology. Continue dialysis.     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 Questions and concerns were addressed.     DVT Prophylaxis: Heparin drip  Diet: Diet NPO Exceptions are: Sips of Water with Meds  Code Status: Full Code    PT/OT Eval Status: As needed    Dispo -Home pending cardiac cath result    Domingo Alcaraz MD

## 2022-05-04 NOTE — PROGRESS NOTES
Pharmacy Consultation Note  (Antibiotic Dosing and Monitoring)    Initial consult date: 5/3/22  Consulting physician/provider: Dr. Mayito Quiles  Drug: Vancomycin  Indication: Osteomyelitis     Age/  Gender Height Weight IBW  Allergy Information   67 y.o./female 5' 10\" (177.8 cm)  5' 10\" (177.8 cm) 163 lb (73.9 kg)     Ideal body weight: 68.5 kg (151 lb 0.2 oz)  Adjusted ideal body weight: 71.7 kg (158 lb 2.5 oz)   Furosemide      Renal Function:  Recent Labs     05/02/22  1757 05/03/22  1307 05/04/22  0710   BUN 9 14 19   CREATININE 2.5* 3.5* 4.4*     No intake or output data in the 24 hours ending 05/04/22 1015    Vancomycin Monitoring:  Trough:  No results for input(s): VANCOTROUGH in the last 72 hours. Random:    Recent Labs     05/03/22  0400 05/04/22  0710   VANCORANDOM 22.9 18.8       Vancomycin Administration Times:    Date  SCr/CrCl       or HD Drug/Dose Time   Given Level(s)   (Time)   5/3 No HD -- -- 22.9   (0400)   5/4 HD x4 hrs Vancomycin 1000 mg IV x1 <1700> 18.8   (0710)                   Assessment:  · Patient is a 78 y.o. female who has been continued on vancomycin for Hx osteomyelitis from last admission  · Receiving vancomycin 750 mg every MWF with dialysis x 6 weeks (End date 6/1/2022) per ID  Estimated Creatinine Clearance: 14 mL/min (A) (based on SCr of 4.4 mg/dL (H)). · To dose vancomycin, pharmacy will be utilizing dosing based off of levels due to patient requiring hemodialysis    Plan:  · HD x4 hrs today. Vancomycin 1000 mg IV x1 after dialysis.   · Will check pre-HD vancomycin level before next HD session (5/6/22)  · Will continue to monitor renal function   · Clinical pharmacy to follow      YAO Samuels [facility] 5/4/2022 10:15 AM

## 2022-05-04 NOTE — PROGRESS NOTES
Physical Therapy  Facility/Department: [facility]    Name: Paulette Ferris  : 1955  MRN: [id number]  Date of Service: 2022    Attempted PT evaluation. Spoke with nursing, pt scheduled for cardiac cath this PM, would like PT to hold at this time. Will f/u with pt as appropriate, schedule permitting.     Alek Ceballos, PT, DPT  [id number]

## 2022-05-04 NOTE — CONSULTS
NEUROSURGERY CONSULTATION     [address] [place] [address]   Chief Complaint   Patient presents with    Back Pain     pt transfer from [facility] for NSTEMI, arrives on heparin drip and to have heart cath in the AM   .     Chief Complaint: low back and right leg pain    HPI:   Alissa Barrios is a 78 y.o.  female who has history of acute exacerbation of her chronic low back pain for the past month. The pain is severe in intensity and burning/achy in character. The pain radiates into the right thigh to the knee. Activity worsens the pain and rest improves it. She has ambulated with a walker since her L3-5 fusion years ago. A L1-2 disc bx one month ago reveals staph epidermitis. Lumbar CT reveals grade 2 spondylolithesis L2-3. Past Medical History:   Diagnosis Date    Acute infection of bone ([address]     infection of rt foot, resolved.     Acute osteomyelitis of phalanx of left hand ([address] 1/27/2022    Left third distal phalanx    Anemia of chronic disease     Arthritis     Breast cancer ([address]     right breast, 2008/ bladder, 2006- last chemotherapy \"years ago\"    CAD (coronary artery disease)     Carpal tunnel syndrome     bilat - for OR left hand 3-17-20     Chronic diastolic CHF (congestive heart failure) ([address] 09/23/2014 9/23/14- echocardiogram revealed moderate LV concentric hypertrophy, stage III diastolic dysfunction, mild tricuspid regurgitation    CKD (chronic kidney disease) stage 4, GFR 15-29 ml/min ([facility])     Diabetic retinopathy ([address]     Glaucoma     Hemodialysis patient ([address]     [facility] wed fri- Dr. Irena Bacon - left arm fistula     Hyperkalemia, diminished renal excretion 11/9/2017    Hyperlipidemia     Hypertension     Hypoglycemia unawareness in type 1 diabetes mellitus ([address] 11/7/2017    Insulin dependent type 2 diabetes mellitus ([address]     Neuropathy     feet    Osteomyelitis due to secondary diabetes ([address]     rt great toe with amputation    Patient is [employer] 11/7/2017    Refusal of blood product     patient states she dose not take blood transfusion    Ventricular hypertrophy     Vitreous hemorrhage ([address]     left eye     Past Surgical History:   Procedure Laterality Date    AMPUTATION      right great toe    ANKLE SURGERY      correction on charcot joint of right ankle    BONE BIOPSY N/A 4/18/2022    BONE BIOPSY PERCUTANEOUS L1/L2 performed by Coretta Muhammad MD at [address] [facility]  12/09/2021    Dr Anjel Gandara Left 3/17/2020    LEFT CARPAL TUNNEL RELEASE performed by Robert Aguilar MD at [address]      bilateral    CHOLECYSTECTOMY      COLONOSCOPY      CYSTOSCOPY      DIALYSIS FISTULA CREATION Left 01/31/2018    upper arm/Dr. Danielle Larson    ECHO COMPL W DOP COLOR FLOW  2/14/2013         ECHO COMPLETE  9/17/2013         FINGER AMPUTATION Left 1/20/2022    AMPUTATION OF LEFT THIRD DIGIT, DISTAL PHALANX performed by Duncan Montenegro MD at [place] [address]      right    OTHER SURGICAL HISTORY  9/27/2011    PPV, membranectomy, laser Right eye    OTHER SURGICAL HISTORY  insertion lumbar drain insertion    10/12/`14    OTHER SURGICAL HISTORY  10/22/15    percutaneous lead placement for spinal cord stimulator    OTHER SURGICAL HISTORY  11/03/2015    Spinal; cord stimulator- turned off as of 3-10-20     IA AV ANAST,UP ARM BASILIC VEIN TRANSPOSIT Left 5/15/2018    TRANSPOSITION STAGE II AV FISTULA - LEFT UPPER ARM performed by Beryl Ross MD at [facility] [address] ANGIOACCESS AV FISTULA Left 9/25/2018    SUPERFICIALIZATION AV FISTULA - LEFT ARM performed by Beryl Ross MD at [facility] W/VITRECTOMY ANY METH Left 4/10/2018    PARS PLANA VITRECTOMY 25 GAUGE RETINAL DETACHMENT REPAIR air fluid exchange, endolaser performed by Tariq Rajput MD at [address] [facility]      L2    TRANSESOPHAGEAL ECHOCARDIOGRAM  11/11/2021    Dr. Verlan Schaumann    TRANSESOPHAGEAL ECHOCARDIOGRAM  04/19/2022    Pauly Newman TUNNELED VENOUS CATHETER PLACEMENT  11/15/2017    VITRECTOMY Left 04/10/2018    PARS PLANA VITRECTOMY; RETINAL DETACHMENT REPAIR; GAS BUBBLE; LASER LEFT EYE      Family History   Problem Relation Age of Onset    Breast Cancer Mother 61    Hypertension Mother     Heart Disease Father     Prostate Cancer Father     Breast Cancer Maternal Grandmother 61      Social History     Socioeconomic History    Marital status: Single     Spouse name: Not on file    Number of children: Not on file    Years of education: Not on file    Highest education level: Not on file   Occupational History    Not on file   Tobacco Use    Smoking status: Never Smoker    Smokeless tobacco: Never Used   Vaping Use    Vaping Use: Never used   Substance and Sexual Activity    Alcohol use: No    Drug use: No    Sexual activity: Not Currently   Other Topics Concern    Not on file   Social History Narrative    Not on file     Social Determinants of Health     Financial Resource Strain:     Difficulty of Paying Living Expenses: Not on file   Food Insecurity:     Worried About [address] in the Last Year: Not on file    Ran Out of Food in the Last Year: Not on file   Transportation Needs:     Lack of Transportation (Medical): Not on file    Lack of Transportation (Non-Medical):  Not on file   Physical Activity:     Days of Exercise per Week: Not on file    Minutes of Exercise per Session: Not on file   Stress:     Feeling of Stress : Not on file   Social Connections:     Frequency of Communication with Friends and Family: Not on file    Frequency of Social Gatherings with Friends and Family: Not on file    Attends Christianity Services: Not on file    Active Member of Clubs or Organizations: Not on file    Attends Club or Organization Meetings: Not on file    Marital Status: Not on file   Intimate Partner Violence:     Fear of Current or Ex-Partner: Not on file    Emotionally Abused: Not on file    Physically Abused: Not on file    Sexually Abused: Not on file   Housing Stability:     Unable to Pay for Housing in the Last Year: Not on file    Number of Lukas in the Last Year: Not on file    Unstable Housing in the Last Year: Not on file       Medications:   Current Facility-Administered Medications   Medication Dose Route Frequency Provider Last Rate Last Admin    vancomycin (VANCOCIN) 750 mg in dextrose 5 % 250 mL IVPB  750 mg IntraVENous Once Kiara Barraza DO        sodium chloride flush 0.9 % injection 5-40 mL  5-40 mL IntraVENous 2 times per day Kiara Barraza DO   10 mL at 05/03/22 1130    sodium chloride flush 0.9 % injection 5-40 mL  5-40 mL IntraVENous PRN [facility] Farber, DO   10 mL at 05/03/22 0602    0.9 % sodium chloride infusion   IntraVENous PRN Cathi Barraza DO        ondansetron (ZOFRAN-ODT) disintegrating tablet 4 mg  4 mg Oral Q8H PRN Oledustin Jeanette, DO        Or    ondansetron [facility]) injection 4 mg  4 mg IntraVENous Q6H PRN DimitriBrockway TUAN Barraza DO        polyethylene glycol (GLYCOLAX) packet 17 g  17 g Oral Daily PRN Kiara Barraza DO        acetaminophen (TYLENOL) tablet 650 mg  650 mg Oral Q6H PRN [facility] Farber, DO        Or    acetaminophen (TYLENOL) suppository 650 mg  650 mg Rectal Q6H PRN Oledustin Goshen, DO        allopurinol (ZYLOPRIM) tablet 100 mg  100 mg Oral Daily DimitriBrockway TUAN Barraza DO        atorvastatin (LIPITOR) tablet 80 mg  80 mg Oral Nightly Kiara Barraza DO   80 mg at 05/03/22 2130    b complex-C-folic acid (NEPHROCAPS) capsule 1 mg  1 mg Oral Nightly Kiara Barraza DO   1 mg at 05/03/22 2131    bumetanide (BUMEX) tablet 2 mg  2 mg Oral Once per day on Sun Tue Thu Kiara Barraza DO        gabapentin (NEURONTIN) capsule 200 mg  200 mg Oral TID Oleta Jeanette, DO   200 mg at 05/03/22 2131    hydrALAZINE (APRESOLINE) tablet 10 mg  10 mg Oral BID Kiara Barraza DO        insulin glargine-yfgn [facility]) injection vial 5 Units  5 Units SubCUTAneous Nightly Luisa Rosado, DO   5 Units at 05/03/22 2147    isosorbide mononitrate (IMDUR) extended release tablet 30 mg  30 mg Oral Daily Kiara Barraza DO        lanthanum [facility]) chewable tablet 1,000 mg  1,000 mg Oral TID WC María Barraza, DO   1,000 mg at 05/03/22 1808    lidocaine-prilocaine (EMLA) cream   Topical PRN María Barraza DO        latanoprost (XALATAN) 0.005 % ophthalmic solution 1 drop  1 drop Right Eye Nightly Luisa Rosado, DO   1 drop at 05/03/22 2130    metoprolol succinate (TOPROL XL) extended release tablet 25 mg  25 mg Oral Daily Kiara Barraza DO        pantoprazole (PROTONIX) tablet 40 mg  40 mg Oral QAM AC María Barraza, DO   40 mg at 05/04/22 0701    oxyCODONE-acetaminophen (PERCOCET) 5-325 MG per tablet 1 tablet  1 tablet Oral Q4H PRN Luisa Mole, DO   1 tablet at 05/04/22 0254    fentaNYL (SUBLIMAZE) injection 25 mcg  25 mcg IntraVENous Q2H PRN Luisa Rosado, DO   25 mcg at 05/03/22 0127    brimonidine (ALPHAGAN) 0.2 % ophthalmic solution 1 drop  1 drop Right Eye BID Luisa Rosado, DO   1 drop at 05/03/22 2130    And    timolol (TIMOPTIC) 0.5 % ophthalmic solution 1 drop  1 drop Right Eye BID Luisa Rosado, DO   1 drop at 05/03/22 2130    ticagrelor (BRILINTA) tablet 90 mg  90 mg Oral BID María Barraza, DO   90 mg at 05/03/22 2131    glucose (GLUTOSE) 40 % oral gel 15 g  15 g Oral PRN Kiara Barraza DO        dextrose 50 % IV solution  12.5 g IntraVENous PRN Luisa Rosado DO   12.5 g at 05/03/22 0601    glucagon (rDNA) injection 1 mg  1 mg IntraMUSCular PRN Kiara Barraza DO        dextrose 5 % solution  100 mL/hr IntraVENous PRN Kiara Barraza DO        vancomycin [facility]) intermittent dosing (placeholder)   Other RX Placeholder Kiara Barraza DO        aspirin EC tablet 81 mg  81 mg Oral Daily Dieudonne Muñoz MD   81 mg at 05/03/22 1218    epoetin fani-epbx (RETACRIT) injection 2,000 Units  2,000 Units SubCUTAneous Q MWF Rey Abrams APRN - CNP        alteplase (CATHFLO) injection 2 mg  2 mg IntraCATHeter Once Lelia Brown MD        heparin flush 100 UNIT/ML injection 100 Units  100 Units IntraCATHeter PRN Lelia Brown MD        heparin 25,000 units in dextrose 5% 250 mL (premix) infusion  5-30 Units/kg/hr IntraVENous Continuous Milderd Luke, DO   Held at 05/04/22 7289    heparin (porcine) injection 2,000 Units  2,000 Units IntraVENous PRN Milderd Luke, DO        heparin (porcine) injection 4,000 Units  4,000 Units IntraVENous PRN Milderd Luke, DO            Allergies:    Furosemide       Review of Systems     Physical Exam     BP (!) 125/28   Pulse 54   Temp 97.5 °F (36.4 °C)   Resp 18   Ht 5' 10\" (1.778 m)   Wt 168 lb 14 oz (76.6 kg)   SpO2 100%   BMI 24.23 kg/m²    Physical Exam   Constitutional: Appears well-nourished. Head: Normocephalic and atraumatic. Eyes: EOM are normal. Pupils are equal, round, and reactive to light. Neck: Normal range of motion. No tracheal deviation present. Cardiovascular: Normal rate. Pulmonary/Chest: No stridor. Abdominal: No distension. Neurological:   Oriented to person, place, and time. CN 3-12 intact  Motor strength full  Sensation intact to light touch, except some reduced sensation to LT in sock distribution   Skin: Skin is warm and dry. Psychiatric: Thought content normal.     Assessment:   78year old female with L1-2 discitis causing severe LBP. She also has a grade 2 spondylolithesis at L2-3 that may be contributing to her back and right thigh pain. Plan:  Pt may benefit from a lumbar decompression and fusion once medically stable. Electronically signed by BRIAN Pedroza on 5/4/2022 at 9:41 AM     I have interviewed and examined the patient and agree with above. I spent over 30 minutes on medical decision making  and in discussing her condition with her. She has adjacent segment stenosis above her prior fusion.   She will need extension of her fusion but is high risk and could consider this when medically cleared    MD Patrick Richards MD

## 2022-05-04 NOTE — PROGRESS NOTES
[facility]  Nephrology Progress Note    Patient's Name: Nicki Rosas      History of Present Illness from 5/3 consult note:    Marcos Collazo is a 78 y.o. female with a past medical history of breast cancer, coronary artery disease, hypertension, hyperlipidemia, and anemia. She presented to the [address] [place] [address] ED on 5/2 with complaints of back and abdominal pain patient was subsequently transferred to [facility] for NSTEMI. Vital signs today include temperature 97.5, respirations 15, pulse 61, BP 98/42, and she was 94% on room air. Lab data from 5/2 include creatinine 2.5, troponin 535, and hemoglobin 9.1. Cardiology is consulted to see the patient. We were consulted to see the patient for dialysis. Patient is known to our service and dialyzes at [address] MWF 1st shift left AV fistula. At present, patient was seen and examined. She reports that she feels \"fair. \"  She reports right hip pain today. She reports that her appetite was fair before coming in to the hospital.  She denied any chest pain or shortness of breath. She denies any nausea, vomiting, or diarrhea. \"    Subjective:    5/4: Patient was seen and examined. She reports that she feels [place]. \"  She reports right hip pain. She denies any chest pain or shortness of breath. She denies any abdominal pain or nausea. PMH:    Past Medical History:   Diagnosis Date    Acute infection of bone ([address]     infection of rt foot, resolved.     Acute osteomyelitis of phalanx of left hand ([address] 1/27/2022    Left third distal phalanx    Anemia of chronic disease     Arthritis     Breast cancer ([address]     right breast, 2008/ bladder, 2006- last chemotherapy \"years ago\"    CAD (coronary artery disease)     Carpal tunnel syndrome     bilat - for OR left hand 3-17-20     Chronic diastolic CHF (congestive heart failure) ([address] 09/23/2014 9/23/14- echocardiogram revealed moderate LV concentric hypertrophy, stage III diastolic dysfunction, mild tricuspid regurgitation    CKD (chronic kidney disease) stage 4, GFR 15-29 ml/min (HCC)     Diabetic retinopathy ([address]     Glaucoma     Hemodialysis patient ([address]     [facility] wed fri- Dr. Doc Foley - left arm fistula     Hyperkalemia, diminished renal excretion 11/9/2017    Hyperlipidemia     Hypertension     Hypoglycemia unawareness in type 1 diabetes mellitus ([address] 11/7/2017    Insulin dependent type 2 diabetes mellitus ([address]     Neuropathy     feet    Osteomyelitis due to secondary diabetes ([address]     rt great toe with amputation    Patient is [employer] 11/7/2017    Refusal of blood product     patient states she dose not take blood transfusion    Ventricular hypertrophy     Vitreous hemorrhage (HCC)     left eye     Patient Active Problem List   Diagnosis    Diabetic retinopathy ([address]    Malignant neoplasm of right female breast ([address]    Atherosclerosis of native coronary artery of native heart without angina pectoris    Moderate obesity    Left ventricular hypertrophy    Herniated lumbar intervertebral disc    Lumbar degenerative disc disease    Pseudomeningocele    Lumbar radiculopathy    Lymphedema of arm    CKD (chronic kidney disease) stage 4, GFR 15-29 ml/min (HCC)    Insulin dependent type 2 diabetes mellitus (HCC)    Anemia of chronic disease    Chronic diastolic CHF (congestive heart failure) (HCC)    Neuropathy    Hypertension    Glaucoma    Refusal of blood product    Pancytopenia ([address]    Controlled type 2 diabetes mellitus with chronic kidney disease on chronic dialysis, with long-term current use of insulin (HCC)    Vitreous hemorrhage ([address]    Patient is Episcopal    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 ([address]    Hyperkalemia, diminished renal excretion    Chronic pain syndrome    Lumbar post-laminectomy syndrome    Myalgia    Cervicalgia    Diabetic peripheral neuropathy (HCC)  [facility] (end [facility] renal disease) [facility]    Bilateral carpal tunnel syndrome    Spinal stenosis of lumbar region with neurogenic claudication    Cardiac arrest ([facility]    ESRD (end stage renal disease) on dialysis ([facility]    Mixed hyperlipidemia    Lymphedema of right upper extremity    Coronary artery disease involving native coronary artery of native heart with angina pectoris (HCC)    Ventricular tachycardia (HCC)    Mitral valve disease    CAD in native artery    Lumbar stenosis without neurogenic claudication    Lumbar foraminal stenosis    Back pain    Intractable low back pain    Unable to ambulate    NSTEMI (non-ST elevated myocardial infarction) ([facility]    Moderate protein-calorie malnutrition (HCC)     Meds:     vancomycin  750 mg IntraVENous Once    sodium chloride flush  5-40 mL IntraVENous 2 times per day    allopurinol  100 mg Oral Daily    atorvastatin  80 mg Oral Nightly    b complex-C-folic acid  1 mg Oral Nightly    bumetanide  2 mg Oral Once per day on Sun Tue Thu    gabapentin  200 mg Oral TID    hydrALAZINE  10 mg Oral BID    insulin glargine-yfgn  5 Units SubCUTAneous Nightly    isosorbide mononitrate  30 mg Oral Daily    lanthanum  1,000 mg Oral TID WC    latanoprost  1 drop Right Eye Nightly    metoprolol succinate  25 mg Oral Daily    pantoprazole  40 mg Oral QAM AC    brimonidine  1 drop Right Eye BID    And    timolol  1 drop Right Eye BID    ticagrelor  90 mg Oral BID    vancomycin (VANCOCIN) intermittent dosing (placeholder)   Other RX Placeholder    aspirin  81 mg Oral Daily    epoetin fani-epbx  2,000 Units SubCUTAneous Q MWF    alteplase  2 mg IntraCATHeter Once      sodium chloride      dextrose      heparin (PORCINE) Infusion 9 Units/kg/hr (05/03/22 1422)     Meds prn:     sodium chloride flush, sodium chloride, ondansetron **OR** ondansetron, polyethylene glycol, acetaminophen **OR** acetaminophen, lidocaine-prilocaine, oxyCODONE-acetaminophen, fentanNYL, glucose, dextrose, glucagon (rDNA), dextrose, heparin flush, heparin (porcine), heparin (porcine)    Meds prior to admission:     No current facility-administered medications on file prior to encounter. Current Outpatient Medications on File Prior to Encounter   Medication Sig Dispense Refill    vancomycin (VANCOCIN) infusion Infuse 750 mg intravenously three times a week Compound per protocol. 9 g 1    midodrine (PROAMATINE) 5 MG tablet Take 5 mg by mouth daily as needed      gabapentin (NEURONTIN) 100 MG capsule Take 2 capsules by mouth 3 times daily for 180 days.  180 capsule 5    atorvastatin (LIPITOR) 80 MG tablet Take 80 mg by mouth nightly      bumetanide (BUMEX) 2 MG tablet Take 2 mg by mouth three times a week Given Sunday,Tuesday,Thursday      isosorbide mononitrate (IMDUR) 30 MG extended release tablet Take 30 mg by mouth daily      insulin glargine (LANTUS) 100 UNIT/ML injection vial Inject 5 Units into the skin nightly       lidocaine-prilocaine (EMLA) 2.5-2.5 % cream Apply topically as needed for Pain       hydrALAZINE (APRESOLINE) 10 MG tablet Take 1 tablet by mouth 2 times daily 180 tablet 1    metoprolol succinate (TOPROL XL) 25 MG extended release tablet Take 1 tablet by mouth daily 90 tablet 1    lanthanum (FOSRENOL) 1000 MG chewable tablet Take 1,000 mg by mouth 3 times daily (with meals)       allopurinol (ZYLOPRIM) 100 MG tablet Take 1 tablet by mouth daily 90 tablet 1    ticagrelor (BRILINTA) 90 MG TABS tablet Take 1 tablet by mouth 2 times daily 180 tablet 1    B Complex-C-Folic Acid (BONI CAPS) 1 MG CAPS Take 1 mg by mouth nightly       omeprazole (PRILOSEC) 20 MG delayed release capsule Take 20 mg by mouth daily as needed       LUMIGAN 0.01 % SOLN ophthalmic drops Place 1 drop into the right eye nightly       COMBIGAN 0.2-0.5 % ophthalmic solution Place 1 drop into the right eye every 12 hours   7     Allergies:    Furosemide    Social History:     reports that she has never smoked. She has never used smokeless tobacco. She reports that she does not drink alcohol and does not use drugs. Family History:         Problem Relation Age of Onset    Breast Cancer Mother 61    Hypertension Mother     Heart Disease Father     Prostate Cancer Father     Breast Cancer Maternal Grandmother 61     Physical Exam:    Patient Vitals for the past 24 hrs:   BP Temp Temp src Pulse Resp SpO2 Height Weight   05/04/22 0830 (!) 104/29 -- -- 54 -- -- -- --   05/04/22 0722 (!) 119/23 97 °F (36.1 °C) Temporal 55 18 100 % -- --   05/04/22 0230 (!) 120/30 97 °F (36.1 °C) -- 64 20 -- -- --   05/04/22 0038 -- -- -- -- -- -- -- 168 lb 14.4 oz (76.6 kg)   05/03/22 2115 (!) 96/45 97 °F (36.1 °C) -- 61 16 100 % -- --   05/03/22 1530 92/78 97.9 °F (36.6 °C) Oral 65 16 100 % -- --   05/03/22 1313 -- -- -- -- -- -- 5' 10\" (1.778 m) --   05/03/22 1300 (!) 102/51 98 °F (36.7 °C) Temporal 63 18 97 % -- --   05/03/22 0845 (!) 98/42 97.5 °F (36.4 °C) Temporal 61 15 94 % -- --     No intake or output data in the 24 hours ending 05/04/22 0837    General: Awake, alert, no acute distress  Neck: No JVD noted  Lungs: Clear bilaterally upper, diminished to the bases bilaterally. Unlabored  CV: Regular rate and rhythm. No rub  Abd: Soft, nontender, nondistended. Active bowel sounds  Skin: Warm and dry. No rash on exposed extremities  Ext: 1+ RUE edema (h/o breast CA);  No BLE edema ; left AV fistula   Neuro: Awake, answers questions appropriately    Data:    Recent Labs     05/02/22  1757 05/03/22  0400 05/04/22  0710   WBC 6.1 5.4 4.4*   HGB 9.1* 8.3* 8.9*   HCT 30.9* 28.4* 30.3*   MCV 93.6 91.6 92.7    238 221     Recent Labs     05/02/22  1757 05/03/22  0400 05/03/22  1307 05/04/22  0710     --  137 136   K 3.6  --  3.8 4.0     --  99 98   CO2 26  --  24 28   CREATININE 2.5*  --  3.5* 4.4*   BUN 9  --  14 19   LABGLOM 23  --  16 12   GLUCOSE 92  --  59* 56*   CALCIUM 8.8  --  8.9 8.5* PHOS  --   --   --  3.2   MG  --  2.4  --  2.7*     Vit D, 25-Hydroxy   Date Value Ref Range Status   03/11/2022 28 (L) 30 - 100 ng/mL Final     Comment:     <20 ng/mL. ........... Cleopatrahradolfo Bolk Deficient  20-30 ng/mL. ......... Kathrene Bolk Insufficient   ng/mL. ........ Kathrene Bolk Sufficient  >100 ng/mL. .......... Cleopatrahrene Bolk Toxic       PTH   Date Value Ref Range Status   03/11/2022 241 (H) 15 - 65 pg/mL Final     Recent Labs     05/04/22  0710   ALT 12   AST 86*   ALKPHOS 124*   BILITOT 0.3       Recent Labs     05/04/22  0710   LABALBU 2.8*       Ferritin   Date Value Ref Range Status   11/09/2017 334 ng/mL Final     Comment:     FERRITIN Reference Ranges:  Adult Males   20 - 60 yrars:    30 - 400 ng/mL  Adult females 17 - 60 years:    13 - 150 ng/mL  Adults greater than 60 years:   no established reference range  Pediatrics:                     no established reference range       Iron   Date Value Ref Range Status   11/09/2017 33 (L) 37 - 145 mcg/dL Final     TIBC   Date Value Ref Range Status   11/09/2017 222 (L) 250 - 450 mcg/dL Final     Vitamin B-12   Date Value Ref Range Status   02/17/2017 1802 (H) 211 - 946 pg/mL Final     Folate   Date Value Ref Range Status   02/17/2017 >20.0 4.8 - 24.2 ng/mL Final     Lab Results   Component Value Date    COLORU Yellow 11/07/2017    NITRU Negative 11/07/2017    GLUCOSEU Negative 11/07/2017    GLUCOSEU NEGATIVE 08/27/2011    KETUA Negative 11/07/2017    UROBILINOGEN 0.2 11/07/2017    BILIRUBINUR Negative 11/07/2017    BILIRUBINUR NEGATIVE 08/27/2011     No results found for: ALVINO, CREURRAN, MACREATRATIO, OSMOU    No components found for: URIC    No results found for: LIPIDPAN    Assessment and Plans:    1. ESRD on HD  OP [facility] MWF 1st shift   left AV fistula  Continue HD MWF    2.  NSTEMI  Troponin 535 on 5/2--> 1388 on 5/3  On heparin drip  For cardiac catheterization today 5/4  Cardiology following    3.   Back pain  Status post bone biopsy 4/18 for lumbar spine osteomyelitis  On vancomycin  Neurosurgery following    4. Hypertension  BP goal<140/90  BP at goal  Monitor on current regimen and with HD    5. Anemia  Hemoglobin target 10-12  Hemoglobin 8.9 today  Continue JAYSHREE as hemoglobin below target  Transfuse for hemoglobin<7  Monitor H&H    6.  Secondary hyperparathyroidism of renal origin  PTH noted in the outpatient setting on 4/25/2022 was 443  Phosphorus 3.2 today  Continue on Fosrenol  Monitor labs    Selene Frost, APRN - CNP     Patient seen and examined all key components of the physical performed independently , case discussed with NP, all pertinent labs and radiologic tests personally reviewed agree with above. Alli Wilcox MD     Department of Internal Medicine  Section of Nephrology  Dialysis Note        PROCEDURE:  Patient seen on hemodialysis    PHYSICIAN:  Ari[facility] Patterson M.D., [facility]    INDICATION:  End-stage renal disease    RX:  See dialysis flowsheet for specifics on access, blood flow rate, dialysate baths, duration of dialysis, anticoagulation and other technical information.     COMMENTS: Procedure in progress and tolerated       Alli Wilcox MD, MD

## 2022-05-05 LAB
ALBUMIN SERPL-MCNC: 3 G/DL (ref 3.5–5.2)
ALP BLD-CCNC: 126 U/L (ref 35–104)
ALT SERPL-CCNC: 11 U/L (ref 0–32)
ANION GAP SERPL CALCULATED.3IONS-SCNC: 11 MMOL/L (ref 7–16)
APTT: 95.8 SEC (ref 24.5–35.1)
APTT: 98.6 SEC (ref 24.5–35.1)
AST SERPL-CCNC: 55 U/L (ref 0–31)
BILIRUB SERPL-MCNC: 0.4 MG/DL (ref 0–1.2)
BUN BLDV-MCNC: 10 MG/DL (ref 6–23)
CALCIUM SERPL-MCNC: 8.4 MG/DL (ref 8.6–10.2)
CHLORIDE BLD-SCNC: 96 MMOL/L (ref 98–107)
CO2: 30 MMOL/L (ref 22–29)
CREAT SERPL-MCNC: 3 MG/DL (ref 0.5–1)
EKG ATRIAL RATE: 55 BPM
EKG P AXIS: 17 DEGREES
EKG P-R INTERVAL: 190 MS
EKG Q-T INTERVAL: 516 MS
EKG QRS DURATION: 104 MS
EKG QTC CALCULATION (BAZETT): 493 MS
EKG R AXIS: 8 DEGREES
EKG T AXIS: -159 DEGREES
EKG VENTRICULAR RATE: 55 BPM
GFR AFRICAN AMERICAN: 19
GFR NON-AFRICAN AMERICAN: 19 ML/MIN/1.73
GLUCOSE BLD-MCNC: 72 MG/DL (ref 74–99)
HCT VFR BLD CALC: 31.9 % (ref 34–48)
HEMOGLOBIN: 9.5 G/DL (ref 11.5–15.5)
MCH RBC QN AUTO: 27.5 PG (ref 26–35)
MCHC RBC AUTO-ENTMCNC: 29.8 % (ref 32–34.5)
MCV RBC AUTO: 92.2 FL (ref 80–99.9)
METER GLUCOSE: 191 MG/DL (ref 74–99)
PDW BLD-RTO: 18.2 FL (ref 11.5–15)
PLATELET # BLD: 224 E9/L (ref 130–450)
PMV BLD AUTO: 10.3 FL (ref 7–12)
POC ACT LR: 314 SECONDS
POTASSIUM SERPL-SCNC: 3.5 MMOL/L (ref 3.5–5)
RBC # BLD: 3.46 E12/L (ref 3.5–5.5)
SODIUM BLD-SCNC: 137 MMOL/L (ref 132–146)
TOTAL PROTEIN: 5.8 G/DL (ref 6.4–8.3)
VANCOMYCIN RANDOM: 19.7 MCG/ML (ref 5–40)
WBC # BLD: 4.3 E9/L (ref 4.5–11.5)

## 2022-05-05 PROCEDURE — 93010 ELECTROCARDIOGRAM REPORT: CPT | Performed by: INTERNAL MEDICINE

## 2022-05-05 PROCEDURE — 2709999900 HC NON-CHARGEABLE SUPPLY

## 2022-05-05 PROCEDURE — C1887 CATHETER, GUIDING: HCPCS

## 2022-05-05 PROCEDURE — 92920 PRQ TRLUML C ANGIOP 1ART&/BR: CPT | Performed by: INTERNAL MEDICINE

## 2022-05-05 PROCEDURE — B2111ZZ FLUOROSCOPY OF MULTIPLE CORONARY ARTERIES USING LOW OSMOLAR CONTRAST: ICD-10-PCS | Performed by: INTERNAL MEDICINE

## 2022-05-05 PROCEDURE — 85730 THROMBOPLASTIN TIME PARTIAL: CPT

## 2022-05-05 PROCEDURE — 80202 ASSAY OF VANCOMYCIN: CPT

## 2022-05-05 PROCEDURE — 2580000003 HC RX 258: Performed by: INTERNAL MEDICINE

## 2022-05-05 PROCEDURE — C1769 GUIDE WIRE: HCPCS

## 2022-05-05 PROCEDURE — 85347 COAGULATION TIME ACTIVATED: CPT

## 2022-05-05 PROCEDURE — 93454 CORONARY ARTERY ANGIO S&I: CPT | Performed by: INTERNAL MEDICINE

## 2022-05-05 PROCEDURE — 92920 PRQ TRLUML C ANGIOP 1ART&/BR: CPT

## 2022-05-05 PROCEDURE — 6360000002 HC RX W HCPCS

## 2022-05-05 PROCEDURE — 6370000000 HC RX 637 (ALT 250 FOR IP): Performed by: INTERNAL MEDICINE

## 2022-05-05 PROCEDURE — 82962 GLUCOSE BLOOD TEST: CPT

## 2022-05-05 PROCEDURE — 93454 CORONARY ARTERY ANGIO S&I: CPT

## 2022-05-05 PROCEDURE — S5553 INSULIN LONG ACTING 5 U: HCPCS | Performed by: INTERNAL MEDICINE

## 2022-05-05 PROCEDURE — 36415 COLL VENOUS BLD VENIPUNCTURE: CPT

## 2022-05-05 PROCEDURE — C1894 INTRO/SHEATH, NON-LASER: HCPCS

## 2022-05-05 PROCEDURE — C1725 CATH, TRANSLUMIN NON-LASER: HCPCS

## 2022-05-05 PROCEDURE — B2151ZZ FLUOROSCOPY OF LEFT HEART USING LOW OSMOLAR CONTRAST: ICD-10-PCS | Performed by: INTERNAL MEDICINE

## 2022-05-05 PROCEDURE — 93005 ELECTROCARDIOGRAM TRACING: CPT | Performed by: INTERNAL MEDICINE

## 2022-05-05 PROCEDURE — 4A023N7 MEASUREMENT OF CARDIAC SAMPLING AND PRESSURE, LEFT HEART, PERCUTANEOUS APPROACH: ICD-10-PCS | Performed by: INTERNAL MEDICINE

## 2022-05-05 PROCEDURE — 2500000003 HC RX 250 WO HCPCS

## 2022-05-05 PROCEDURE — C1760 CLOSURE DEV, VASC: HCPCS

## 2022-05-05 PROCEDURE — 85027 COMPLETE CBC AUTOMATED: CPT

## 2022-05-05 PROCEDURE — 02703ZZ DILATION OF CORONARY ARTERY, ONE ARTERY, PERCUTANEOUS APPROACH: ICD-10-PCS | Performed by: INTERNAL MEDICINE

## 2022-05-05 PROCEDURE — 99232 SBSQ HOSP IP/OBS MODERATE 35: CPT | Performed by: INTERNAL MEDICINE

## 2022-05-05 PROCEDURE — 2140000000 HC CCU INTERMEDIATE R&B

## 2022-05-05 PROCEDURE — 80053 COMPREHEN METABOLIC PANEL: CPT

## 2022-05-05 RX ORDER — SODIUM CHLORIDE 9 MG/ML
INJECTION, SOLUTION INTRAVENOUS ONCE
Status: COMPLETED | OUTPATIENT
Start: 2022-05-05 | End: 2022-05-05

## 2022-05-05 RX ORDER — MIDODRINE HYDROCHLORIDE 5 MG/1
5 TABLET ORAL DAILY PRN
Status: DISCONTINUED | OUTPATIENT
Start: 2022-05-05 | End: 2022-05-12 | Stop reason: HOSPADM

## 2022-05-05 RX ORDER — SODIUM CHLORIDE 0.9 % (FLUSH) 0.9 %
5-40 SYRINGE (ML) INJECTION EVERY 12 HOURS SCHEDULED
Status: DISCONTINUED | OUTPATIENT
Start: 2022-05-05 | End: 2022-05-12 | Stop reason: HOSPADM

## 2022-05-05 RX ORDER — SODIUM CHLORIDE 9 MG/ML
INJECTION, SOLUTION INTRAVENOUS PRN
Status: DISCONTINUED | OUTPATIENT
Start: 2022-05-05 | End: 2022-05-12 | Stop reason: HOSPADM

## 2022-05-05 RX ORDER — SODIUM CHLORIDE 0.9 % (FLUSH) 0.9 %
5-40 SYRINGE (ML) INJECTION PRN
Status: DISCONTINUED | OUTPATIENT
Start: 2022-05-05 | End: 2022-05-12 | Stop reason: HOSPADM

## 2022-05-05 RX ORDER — ACETAMINOPHEN 325 MG/1
650 TABLET ORAL EVERY 4 HOURS PRN
Status: DISCONTINUED | OUTPATIENT
Start: 2022-05-05 | End: 2022-05-12 | Stop reason: HOSPADM

## 2022-05-05 RX ADMIN — ATORVASTATIN CALCIUM 80 MG: 80 TABLET, FILM COATED ORAL at 21:37

## 2022-05-05 RX ADMIN — MIDODRINE HYDROCHLORIDE 5 MG: 5 TABLET ORAL at 15:36

## 2022-05-05 RX ADMIN — ALLOPURINOL 100 MG: 100 TABLET ORAL at 08:15

## 2022-05-05 RX ADMIN — LANTHANUM CARBONATE 1000 MG: 500 TABLET, CHEWABLE ORAL at 17:48

## 2022-05-05 RX ADMIN — SODIUM CHLORIDE, PRESERVATIVE FREE 10 ML: 5 INJECTION INTRAVENOUS at 21:38

## 2022-05-05 RX ADMIN — ASPIRIN 81 MG: 81 TABLET, COATED ORAL at 08:15

## 2022-05-05 RX ADMIN — LANTHANUM CARBONATE 1000 MG: 500 TABLET, CHEWABLE ORAL at 12:20

## 2022-05-05 RX ADMIN — INSULIN GLARGINE-YFGN 5 UNITS: 100 INJECTION, SOLUTION SUBCUTANEOUS at 21:45

## 2022-05-05 RX ADMIN — ISOSORBIDE MONONITRATE 30 MG: 30 TABLET, EXTENDED RELEASE ORAL at 08:15

## 2022-05-05 RX ADMIN — SODIUM CHLORIDE, PRESERVATIVE FREE 10 ML: 5 INJECTION INTRAVENOUS at 14:07

## 2022-05-05 RX ADMIN — GABAPENTIN 200 MG: 100 CAPSULE ORAL at 21:37

## 2022-05-05 RX ADMIN — BUMETANIDE 2 MG: 1 TABLET ORAL at 08:14

## 2022-05-05 RX ADMIN — LATANOPROST 1 DROP: 50 SOLUTION OPHTHALMIC at 21:51

## 2022-05-05 RX ADMIN — TIMOLOL MALEATE 1 DROP: 5 SOLUTION OPHTHALMIC at 21:43

## 2022-05-05 RX ADMIN — BRIMONIDINE TARTRATE 1 DROP: 2 SOLUTION OPHTHALMIC at 08:15

## 2022-05-05 RX ADMIN — TICAGRELOR 90 MG: 90 TABLET ORAL at 21:37

## 2022-05-05 RX ADMIN — OXYCODONE AND ACETAMINOPHEN 1 TABLET: 5; 325 TABLET ORAL at 12:22

## 2022-05-05 RX ADMIN — HYDRALAZINE HYDROCHLORIDE 10 MG: 10 TABLET, FILM COATED ORAL at 08:14

## 2022-05-05 RX ADMIN — PANTOPRAZOLE SODIUM 40 MG: 40 TABLET, DELAYED RELEASE ORAL at 08:15

## 2022-05-05 RX ADMIN — TICAGRELOR 90 MG: 90 TABLET ORAL at 08:15

## 2022-05-05 RX ADMIN — METOPROLOL SUCCINATE 25 MG: 25 TABLET, EXTENDED RELEASE ORAL at 08:15

## 2022-05-05 RX ADMIN — SODIUM CHLORIDE: 9 INJECTION, SOLUTION INTRAVENOUS at 14:03

## 2022-05-05 RX ADMIN — BRIMONIDINE TARTRATE 1 DROP: 2 SOLUTION OPHTHALMIC at 21:43

## 2022-05-05 RX ADMIN — NEPHROCAP 1 MG: 1 CAP ORAL at 21:37

## 2022-05-05 RX ADMIN — TIMOLOL MALEATE 1 DROP: 5 SOLUTION OPHTHALMIC at 08:15

## 2022-05-05 RX ADMIN — GABAPENTIN 200 MG: 100 CAPSULE ORAL at 08:15

## 2022-05-05 ASSESSMENT — PAIN DESCRIPTION - LOCATION: LOCATION: BACK

## 2022-05-05 ASSESSMENT — PAIN DESCRIPTION - ORIENTATION
ORIENTATION: LOWER
ORIENTATION: RIGHT

## 2022-05-05 ASSESSMENT — PAIN SCALES - GENERAL
PAINLEVEL_OUTOF10: 0
PAINLEVEL_OUTOF10: 8
PAINLEVEL_OUTOF10: 7
PAINLEVEL_OUTOF10: 0

## 2022-05-05 ASSESSMENT — PAIN - FUNCTIONAL ASSESSMENT: PAIN_FUNCTIONAL_ASSESSMENT: ACTIVITIES ARE NOT PREVENTED

## 2022-05-05 NOTE — PROGRESS NOTES
Pharmacy Consultation Note  (Antibiotic Dosing and Monitoring)    Initial consult date: 5/3/22  Consulting physician/provider: Dr. Sherry Rae  Drug: Vancomycin  Indication: Osteomyelitis     Age/  Gender Height Weight IBW  Allergy Information   67 y.o./female 5' 10\" (177.8 cm)  5' 10\" (177.8 cm) 163 lb (73.9 kg)     Ideal body weight: 68.5 kg (151 lb 0.2 oz)  Adjusted ideal body weight: 71.1 kg (156 lb 11.9 oz)   Furosemide      Renal Function:  Recent Labs     05/02/22  1757 05/03/22  1307 05/04/22  0710   BUN 9 14 19   CREATININE 2.5* 3.5* 4.4*       Intake/Output Summary (Last 24 hours) at 5/5/2022 6552  Last data filed at 5/4/2022 1250  Gross per 24 hour   Intake 300 ml   Output 2200 ml   Net -1900 ml       Vancomycin Monitoring:  Trough:  No results for input(s): VANCOTROUGH in the last 72 hours. Random:    Recent Labs     05/03/22  0400 05/04/22  0710   VANCORANDOM 22.9 18.8       Vancomycin Administration Times:    Date  SCr/CrCl       or HD Drug/Dose Time   Given Level(s)   (Time)   5/3 No HD -- -- 22.9   (0400)   5/4 HD x4 hrs Vancomycin 1000 mg IV x1 <1700> 18.8   (0710)   5/5 No HD -- -- 19.7   (0630)     Assessment:  · Patient is a 78 y.o. female who has been continued on vancomycin for Hx osteomyelitis from last admission  · Receiving vancomycin 750 mg every MWF with dialysis x 6 weeks (End date 6/1/2022) per ID  Estimated Creatinine Clearance: 14 mL/min (A) (based on SCr of 4.4 mg/dL (H)). · To dose vancomycin, pharmacy will be utilizing dosing based off of levels due to patient requiring hemodialysis   · Unclear if dose of vancomycin was given yesterday afternoon (5/4). Patient told RN that she received a dose in dialysis, but no dose was charted.    · 5/5: AM level 19.7- it appears that patient did receive dose of vanco, but it was not charted in dialysis      Plan:  · No HD today, no vancomycin ordered  · Will check pre-HD vancomycin level before next HD session (5/6/22)  · Will continue to monitor renal function   · Clinical pharmacy to follow      Rina Fraire, [address] [facility] 5/5/2022 7:22 AM

## 2022-05-05 NOTE — PROCEDURES
[address]                  [address] [place],  [facility]                            CARDIAC CATHETERIZATION    PATIENT NAME: Stefano Cross                    :        1955  MED REC NO:   [id number]                            ROOM:       2905  ACCOUNT NO:   [de-identified]                           ADMIT DATE: 2022  PROVIDER:     Bret Brock MD    DATE OF PROCEDURE:  2022    PROCEDURES:  1. Coronary angiography. 2.  Balloon angioplasty of in-stent stenosis of the mid RCA. 4.  Conscious sedation using Versed and fentanyl. Indication #4 with score of 7. Indication for PCI is 16 and 7. INDICATION FOR PROCEDURE:  The patient is a 50-year-old female with  known CAD, was noted to have elevated troponin. The patient had  significant in-stent stenosis in mid RCA on the previous cardiac  catheterization. However, at that time, plan was for patient to have  open heart surgery for mitral valve disease; however, that was canceled,  so patient was brought to the cath lab for further evaluation of her  coronary anatomy and possible intervention on the right coronary artery. DESCRIPTION OF PROCEDURE:  After the appropriate informed consent, the  right groin area was prepped and draped in the usual sterile fashion. A  time-out was completed. The right groin area was locally anesthetized  with 10 mL of 2% lidocaine. A Cook needle was used to access the right  femoral artery using real-time ultrasound guidance. A 6-Turkmen  introducer sheath was used to cannulate the right femoral artery. A  6-Turkmen JL4 and 6-Turkmen JR4 catheter were used for coronary  angiography in multiple projections. ANGIOGRAPHIC FINDINGS:  1. The left main coronary artery arises normally from the left sinus of  Valsalva. It is a moderately calcified vessel with mild ostial and mid  segment disease.   It bifurcates into left anterior descending artery and  left circumflex artery. 2.  The left anterior descending artery is a large vessel that gives off  few diagonal branches. The LAD has patent stents in the mid segment  with very mild in-stent stenosis. 3.  The left circumflex artery is a good size vessel, has total  occlusion in the proximal segment. It gives off a small to mid size OM  branch that has no significant disease. There is another OM branch that  is totally occluded which also known to be occluded from previous  cardiac catheterization with left to left collaterals. 4.  The right coronary artery arises normally from the right sinus  Valsalva. It is a large vessel dominant circulation. Has in-stent  stenosis of the mid segment estimated at 90%. The rest of the vessel  and the rest of the stents are patent. After completing coronary angiography, we proceeded with a percutaneous  coronary intervention. We used 6-Bulgarian JR4 guiding catheter to engage  the vessel. We used BMW wire to cross the lesion. The lesion was  predilated using 2.0 x 12 mm balloon and then 3.0 x 12 mm noncompliant  balloon with several inflations. Followup angiography showed less than  10% residual stenosis and LATOSHA-3 flow distally. At that time, the wire  and the catheter were pulled out from the body, limited right  iliofemoral angiography confirmed the arteriotomy was in the right  common femoral artery, so Perclose ProGlide closure device was deployed  successfully with effective hemostasis. COMPLICATIONS:  None. ESTIMATED TOTAL BLOOD LOSS:  20 to 30 mL. MEDICATIONS USED DURING THE PROCEDURE:  We used intravenous heparin for  anticoagulation. Of note, the patient is on aspirin and Brilinta. CONSCIOUS SEDATION:  We used Versed and fentanyl for conscious sedation. First dose was given at [id number], procedure ended at 1015. There were 41  minutes of direct face-to-face supervision during conscious sedation  administration.     Total fluoroscopy time was 5.9 minutes. Total contrast used was 100 mL of Isovue. IMPRESSION:  1. In-stent stenosis of mid RCA with successful balloon angioplasty  (pre-PCI LATOSHA-3 flow, post-PCI LATOSHA-3 flow, pre-PCI stenosis 90%,  post-PCI stenosis less than 10%). 2.  Patent stent in the LAD. 3.  Totally occluded left circumflex artery. RECOMMENDATIONS:  1. Continue current treatment. 2.  Aspirin indefinitely. 3.  P2Y12 inhibitors for at least a year.         Alvarado Frank MD    D: 05/05/2022 10:36:58       T: 05/05/2022 11:47:32     [email or web address]  Job#: [id number]     Doc#: [id number]    CC:

## 2022-05-05 NOTE — PROGRESS NOTES
Occupational Therapy    OT order received. Chart reviewed. Await further NS POC for surgical vs conservative intervention . Will re-attempt eval when appropriate.      Skylar Medina, [facility], OTR/L [id number]

## 2022-05-05 NOTE — PROGRESS NOTES
Physical Therapy  Name: Norma Sánchez  Room: 1650/1838-T  Date: 05/05/22    PT consult received and appreciated. PT eval on hold. Awaiting heart cath this AM and neurosurgical intervention if needed once medically stable.      Ashtyn Aiken, PT, DPT  [id number]

## 2022-05-05 NOTE — PROGRESS NOTES
Hospitalist Progress Note      PCP: Heriberto Gordon MD    Date of Admission: 5/2/2022    Hospital Course:   \" 78 y.o. female who past medical history that includes lumbar osteomyelitis, coronary artery disease, ESRD, anemia, hypertension, hyperlipidemia, diabetes, history of bladder carcinoma presented with back pain and abdominal pain for several weeks and is worsening over the past few days. She was recently admitted due to osteomyelitis. She was recently discharged to rehab  on vancomycin. In the ER, there was concern for STEMI on EKG. Interventional cardiology was consulted from the ER and they advised no acute STEMI on EKG however they will plan to do a PCI in the morning. She was placed on heparin drip. He was transferred to see Eric Lr for continued work-up. ER also spoke with neurosurgeon, Dr. Josefa Dalton for continued signs of osteomyelitis. In ER, routine labwork was performed which revealed creatinine 2.5, troponin 323->535. EKG revealed atrial fibrillationST elevation noted in leads aVR ST depressions noted in leads V4 through V6 as well as in leads I to, ST elevation in leads V1. Repeat EKG. The patient received heparin drip in the emergency room and was admitted under the care of [facility] physicians. \"       Subjective:    She had cardiac cath. She has chronic back pain.     Medications:  Reviewed    Infusion Medications    sodium chloride      sodium chloride      dextrose      heparin (PORCINE) Infusion 7 Units/kg/hr (05/05/22 0053)     Scheduled Medications    sodium chloride flush  5-40 mL IntraVENous 2 times per day    allopurinol  100 mg Oral Daily    atorvastatin  80 mg Oral Nightly    b complex-C-folic acid  1 mg Oral Nightly    bumetanide  2 mg Oral Once per day on Sun Tue Thu    gabapentin  200 mg Oral TID    hydrALAZINE  10 mg Oral BID    insulin glargine-yfgn  5 Units SubCUTAneous Nightly    isosorbide mononitrate  30 mg Oral Daily    lanthanum  1,000 mg Oral TID WC  latanoprost  1 drop Right Eye Nightly    metoprolol succinate  25 mg Oral Daily    pantoprazole  40 mg Oral QAM AC    brimonidine  1 drop Right Eye BID    And    timolol  1 drop Right Eye BID    ticagrelor  90 mg Oral BID    vancomycin (VANCOCIN) intermittent dosing (placeholder)   Other RX Placeholder    aspirin  81 mg Oral Daily    epoetin fani-epbx  2,000 Units SubCUTAneous Q MWF    alteplase  2 mg IntraCATHeter Once     PRN Meds: sodium chloride flush, sodium chloride, acetaminophen, sodium chloride, ondansetron **OR** ondansetron, polyethylene glycol, [DISCONTINUED] acetaminophen **OR** acetaminophen, lidocaine-prilocaine, oxyCODONE-acetaminophen, fentanNYL, glucose, dextrose, glucagon (rDNA), dextrose, heparin flush, heparin (porcine), heparin (porcine)      Intake/Output Summary (Last 24 hours) at 5/5/2022 1116  Last data filed at 5/4/2022 1250  Gross per 24 hour   Intake 300 ml   Output 2200 ml   Net -1900 ml       Physical Exam Performed:    /63   Pulse 57   Temp 97.9 °F (36.6 °C) (Tympanic)   Resp 16   Ht 5' 10\" (1.778 m)   Wt 165 lb 5.5 oz (75 kg)   SpO2 96%   BMI 23.72 kg/m²     General appearance: No apparent distress, appears stated age and cooperative. HEENT: Pupils equal, round, and reactive to light. Conjunctivae/corneas clear. Neck: Supple, with full range of motion. R port-a-cath  Respiratory:  Normal respiratory effort. Clear to auscultation, bilaterally without Rales/Wheezes/Rhonchi. Cardiovascular: Regular rate and rhythm with normal S1/S2 without murmurs, rubs or gallops. Abdomen: Soft, non-tender, non-distended with normal bowel sounds. Musculoskeletal: No clubbing, cyanosis or edema bilaterally. Left UE AVF  Skin: Skin color, texture, turgor normal.  No rashes or lesions. Neurologic:  Neurovascularly intact without any focal sensory/motor deficits.  Cranial nerves: II-XII intact, grossly non-focal.  Psychiatric: Alert and oriented, thought content appropriate, normal insight      Labs:   Recent Labs     05/03/22  0400 05/04/22  0710 05/05/22  0630   WBC 5.4 4.4* 4.3*   HGB 8.3* 8.9* 9.5*   HCT 28.4* 30.3* 31.9*    221 224     Recent Labs     05/03/22  1307 05/04/22  0710 05/05/22  0630    136 137   K 3.8 4.0 3.5   CL 99 98 96*   CO2 24 28 30*   BUN 14 19 10   CREATININE 3.5* 4.4* 3.0*   CALCIUM 8.9 8.5* 8.4*   PHOS  --  3.2  --      Recent Labs     05/04/22  0710 05/05/22  0630   AST 86* 55*   ALT 12 11   BILITOT 0.3 0.4   ALKPHOS 124* 126*     No results for input(s): INR in the last 72 hours. No results for input(s): Shellia Bugler in the last 72 hours. Urinalysis:      Lab Results   Component Value Date    NITRU Negative 11/07/2017    WBCUA 1-3 11/07/2017    WBCUA NONE 08/27/2011    BACTERIA MODERATE 11/07/2017    RBCUA 0-1 11/07/2017    RBCUA 1-3 02/13/2013    BLOODU Negative 11/07/2017    SPECGRAV 1.025 11/07/2017    GLUCOSEU Negative 11/07/2017    GLUCOSEU NEGATIVE 08/27/2011       Radiology:  No orders to display           Assessment/Plan:    Active Hospital Problems    Diagnosis     Moderate protein-calorie malnutrition ([address] [E44.0]      Priority: Medium    NSTEMI (non-ST elevated myocardial infarction) ([address] [I21.4]      Priority: Medium       NSTEMI  -Cardiology consulted. Plan for cardiac cath today,await report. .  -Pt is still on heparin drip. Await Cardiology recs. -Continue aspirin,brillinta,statin and metoprolol    Osteomyelitis of lumbar spine L1-L2   Hx lumbar fusion  -Neurosurgery consultation. Recommend that patient benefit from a lumbar decompression and fusion once medically stable  -Pain control.    -Continue vancomycin with HD. Family wanted update from ID. I reviewed plan for 6 weeks of vanco and plan to follow up with Dr Camron Gann. CAD  -As above    End-stage renal disease on hemodialysis- Monday Wednesday Friday,   [facility] nephrology. Continue dialysis.     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 Questions and concerns were addressed. DVT Prophylaxis: Heparin drip  Diet: ADULT DIET; Regular; Low Fat/Low Chol/High Fiber/2 gm Na;  Low Sodium (2 gm); 1200 ml  Code Status: Full Code    PT/OT Eval Status: As needed    Dispo -Home pending ok from Cardiology    Lazaro Murray MD

## 2022-05-05 NOTE — PROGRESS NOTES
Inpatient Cardiology Progress note     PATIENT IS BEING FOLLOWED FOR: NSTEMI    Rose Song is a 78 y.o. female who follows with Dr. Patel Odor: Right sided mid to low back pain radiating down RLE. No CP or SOB  OBJECTIVE: Seen in the cath lab holding area. laying flat in bed in no apparent distress     ROS:  Consist: Denies fevers, chills or night sweats  Heart: Denies chest pain, palpitations, lightheadedness, dizziness or syncope  Lungs: Denies SOB, cough, wheezing, orthopnea or PND  GI: Denies abdominal pain, vomiting or diarrhea    PHYSICAL EXAM:   BP (!) 120/56   Pulse 62   Temp 97.9 °F (36.6 °C) (Tympanic)   Resp 14   Ht 5' 10\" (1.778 m)   Wt 165 lb 5.5 oz (75 kg)   SpO2 96%   BMI 23.72 kg/m²    B/P Range last 24 hours: Systolic (39BRH), IGH:066 , Min:86 , NKD:719    Diastolic (21KLB), E, Min:30, Max:62    CONST: Well developed, well nourished AA female who appears stated age. Awake, alert and cooperative. No apparent distress  HEENT:   Head- Normocephalic, atraumatic   Eyes- Conjunctivae pink, anicteric  Throat- Oral mucosa pink and moist  Neck-  No stridor, trachea midline, no jugular venous distention. No carotid bruit  CHEST: Chest symmetrical and non-tender to palpation. No accessory muscle use or intercostal retractions  RESPIRATORY:  Lung sounds - clear throughout fields   CARDIOVASCULAR:     Heart Inspection- shows no noted pulsations  Heart Palpation- no heaves or thrills; PMI is non-displaced   Heart Ausculation- Regular rate and rhythm, no murmur. No s3, s4 or rub   PV: No lower extremity edema. No varicosities. Pedal pulses palpable, no clubbing or cyanosis   ABDOMEN: Soft, non-tender to light palpation. Bowel sounds present. No palpable masses no organomegaly; no abdominal bruit  MS: Good muscle strength and tone. No atrophy or abnormal movements. : Deferred  SKIN: Warm and dry no statis dermatitis or ulcers   NEURO / PSYCH: Oriented to person, place and time. Speech clear and appropriate. Follows all commands.  Pleasant affect       Intake/Output Summary (Last 24 hours) at 5/5/2022 1041  Last data filed at 5/4/2022 1250  Gross per 24 hour   Intake 300 ml   Output 2200 ml   Net -1900 ml       Weight:   Wt Readings from Last 3 Encounters:   05/05/22 165 lb 5.5 oz (75 kg)   05/02/22 163 lb (73.9 kg)   04/22/22 178 lb 2.1 oz (80.8 kg)     Current Inpatient Medications:   sodium chloride flush  5-40 mL IntraVENous 2 times per day    allopurinol  100 mg Oral Daily    atorvastatin  80 mg Oral Nightly    b complex-C-folic acid  1 mg Oral Nightly    bumetanide  2 mg Oral Once per day on Sun Tue Thu    gabapentin  200 mg Oral TID    hydrALAZINE  10 mg Oral BID    insulin glargine-yfgn  5 Units SubCUTAneous Nightly    isosorbide mononitrate  30 mg Oral Daily    lanthanum  1,000 mg Oral TID WC    latanoprost  1 drop Right Eye Nightly    metoprolol succinate  25 mg Oral Daily    pantoprazole  40 mg Oral QAM AC    brimonidine  1 drop Right Eye BID    And    timolol  1 drop Right Eye BID    ticagrelor  90 mg Oral BID    vancomycin (VANCOCIN) intermittent dosing (placeholder)   Other RX Placeholder    aspirin  81 mg Oral Daily    epoetin fani-epbx  2,000 Units SubCUTAneous Q MWF    alteplase  2 mg IntraCATHeter Once       IV Infusions (if any):   sodium chloride      dextrose      heparin (PORCINE) Infusion 7 Units/kg/hr (05/05/22 0053)       DIAGNOSTIC/ LABORATORY DATA:  Labs:   CBC:   Recent Labs     05/04/22  0710 05/05/22  0630   WBC 4.4* 4.3*   HGB 8.9* 9.5*   HCT 30.3* 31.9*    224     BMP:   Recent Labs     05/04/22  0710 05/05/22  0630    137   K 4.0 3.5   CO2 28 30*   BUN 19 10   CREATININE 4.4* 3.0*   LABGLOM 12 19   CALCIUM 8.5* 8.4*     Mag:   Recent Labs     05/03/22  0400 05/04/22  0710   MG 2.4 2.7*     Phos:   Recent Labs     05/04/22  0710   PHOS 3.2     TFT:   Lab Results   Component Value Date    TSH 3.120 05/04/2022    K5JWOCK 5.0 12/24/2012    T4FREE 1.58 05/19/2021      HgA1c:   Lab Results   Component Value Date    LABA1C 5.1 05/04/2022     APTT:  Recent Labs     05/04/22  2350 05/05/22  0630   APTT 95.8* 98.6*     CARDIAC ENZYMES:  Recent Labs     05/02/22  1757 05/02/22  2139 05/03/22  0400   TROPHS 323* 535* 1,388*     FASTING LIPID PANEL:  Lab Results   Component Value Date    CHOL 103 05/04/2022    HDL 42 05/04/2022    LDLCALC 48 05/04/2022    TRIG 66 05/04/2022     LIVER PROFILE:  Recent Labs     05/04/22  0710 05/05/22  0630   AST 86* 55*   ALT 12 11   LABALBU 2.8* 3.0*       CXR 5/2/22:   FINDINGS:  Unchanged implanted central venous catheter on the right and thoracic spinal neurostimulator.  Airspace disease has resolved.  I suspect that this represented cardiogenic edema.  Heart is enlarged.  Pulmonary vascularity is normal.  Neither costophrenic angle is blunted. CTA pulmonary 5/2/22:  No evidence of pulmonary embolism or acute thoracic aortic abnormality.   Cardiomegaly.  Minimal left basilar atelectasis.  Otherwise, clear lungs.   Destructive process of the upper lumbar spine at the L1-L2 level, only partially imaged.  This is seen to better extent on the lumbar spine MRI of the same day. CT lumbar spine 5/2/22:  Again seen are severe erosive changes at the L1-2 and L2-3 endplates, highly suggestive discitis-osteomyelitis.   Associated spondylolisthesis and inflammatory change again results in severe spinal stenosis at L2-3 and moderate spinal stenosis at L1-2.   Further evaluation with contrast enhanced MRI of the lumbar spine is recommended. 12 lead EKG 5/3/22: Sinus rhythm with premature atrial complexes with aberrant conduction. Cannot rule out Inferior infarct , age undetermined. Cannot rule out Anterior infarct , age undetermined. ST & T wave abnormality, consider lateral ischemia      Telemetry: Sinus bradycardia        ASSESSMENT:   1. NSTEMI  2.  CAD with h/o LAD and RCA stents 5/2021 and with cath 12/21 showing patent LAD stent, hi grade  focal in stent restenosis of the RCA and known  of OM2.  Was supposed to have CABG x 2 with resection of the MV fibroelastoma nad possible mitral valve repair  3. 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 Insulin requiring DM with neuropathy, retinopathy and nephropathy  6. ESRD on hemodialysis  7. Borderline low BP. H/o HTN  8. HLD  9. Jehovah Witness  10. Bladder carcinoma s/p chemotherapy  11. Osteomyelitis of lumbar spine L1-L3 s/p bone marrow bx and fusion 4/19/2022. H/o  DDD lumbar spine with h/o lumbar laminectomy and spinal cord stimulator with intractable back pain  12. S/p amputation of left hand distal middle finger for osteomyelitis  13. Chronic anemia         PLAN:  1. Continue current cardiac medications  2. Cath +/- PCI today --> further recommendations to follow  3. Rest as per the primary service +/- other consultants  4.  Will follow    Electronically signed by Jocy Campos MD on 5/5/2022 at 10:41 AM

## 2022-05-05 NOTE — PROGRESS NOTES
Called Dialysis department to verify wether Vancomycin was given with dialysis as planned  today but there was no answer. Called Pharmacy to see if they could verify. They are unable to verify whether it was given but a dose was pulled by pharmacy this morning and was not returned to them. The clinical pharmacist instructed to cliff the dose not given and a Vanc level will be checked in the morning.

## 2022-05-05 NOTE — PROGRESS NOTES
[facility]  Nephrology Progress Note    Patient's Name: Lucy Queen      History of Present Illness from 5/3 consult note:    Kodi Asher is a 78 y.o. female with a past medical history of breast cancer, coronary artery disease, hypertension, hyperlipidemia, and anemia. She presented to the [address] [facility] ED on 5/2 with complaints of back and abdominal pain patient was subsequently transferred to [facility] for NSTEMI. Vital signs today include temperature 97.5, respirations 15, pulse 61, BP 98/42, and she was 94% on room air. Lab data from 5/2 include creatinine 2.5, troponin 535, and hemoglobin 9.1. Cardiology is consulted to see the patient. We were consulted to see the patient for dialysis. Patient is known to our service and dialyzes at [address] MWF 1st shift left AV fistula. At present, patient was seen and examined. She reports that she feels \"fair. \"  She reports right hip pain today. She reports that her appetite was fair before coming in to the hospital.  She denied any chest pain or shortness of breath. She denies any nausea, vomiting, or diarrhea. \"    Subjective:    5/5: Patient was seen and examined. She had just returned from her heart cath. Denies any chest pain or shortness of breath. She has a visitor at the bedside. PMH:    Past Medical History:   Diagnosis Date    Acute infection of bone ([address]     infection of rt foot, resolved.     Acute osteomyelitis of phalanx of left hand ([address] 1/27/2022    Left third distal phalanx    Anemia of chronic disease     Arthritis     Breast cancer ([address]     right breast, 2008/ bladder, 2006- last chemotherapy \"years ago\"    CAD (coronary artery disease)     Carpal tunnel syndrome     bilat - for OR left hand 3-17-20     Chronic diastolic CHF (congestive heart failure) ([address] 09/23/2014 9/23/14- echocardiogram revealed moderate LV concentric hypertrophy, stage III diastolic dysfunction, mild tricuspid regurgitation    CKD (chronic kidney disease) stage 4, GFR 15-29 ml/min (HCC)     Diabetic retinopathy (HCC)     Glaucoma     Hemodialysis patient ([address]     [facility]- Dr. Kalina Lau - left arm fistula     Hyperkalemia, diminished renal excretion 11/9/2017    Hyperlipidemia     Hypertension     Hypoglycemia unawareness in type 1 diabetes mellitus ([address] 11/7/2017    Insulin dependent type 2 diabetes mellitus ([address]     Neuropathy     feet    Osteomyelitis due to secondary diabetes ([address]     rt great toe with amputation    Patient is Taoist 11/7/2017    Refusal of blood product     patient states she dose not take blood transfusion    Ventricular hypertrophy     Vitreous hemorrhage (HCC)     left eye     Patient Active Problem List   Diagnosis    Diabetic retinopathy ([address]    Malignant neoplasm of right female breast ([address]    Atherosclerosis of native coronary artery of native heart without angina pectoris    Moderate obesity    Left ventricular hypertrophy    Herniated lumbar intervertebral disc    Lumbar degenerative disc disease    Pseudomeningocele    Lumbar radiculopathy    Lymphedema of arm    CKD (chronic kidney disease) stage 4, GFR 15-29 ml/min (HCC)    Insulin dependent type 2 diabetes mellitus (HCC)    Anemia of chronic disease    Chronic diastolic CHF (congestive heart failure) (HCC)    Neuropathy    Hypertension    Glaucoma    Refusal of blood product    Pancytopenia ([address]    Controlled type 2 diabetes mellitus with chronic kidney disease on chronic dialysis, with long-term current use of insulin (HCC)    Vitreous hemorrhage ([address]    Patient is Taoist    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 ([address]    Hyperkalemia, diminished renal excretion    Chronic pain syndrome    Lumbar post-laminectomy syndrome    Myalgia    Cervicalgia    Diabetic peripheral neuropathy ([address]    ESRD (end stage renal disease) [facility]    Bilateral carpal tunnel syndrome    Spinal stenosis of lumbar region with neurogenic claudication    Card[facility] arrest [facility]    ESRD (end stage renal disease) on dialysis ([facility]    Mixed hyperlipidemia    Lymphedema of right upper extremity    Coronary artery disease involving native coronary artery of native heart with angina pectoris (HCC)    Ventricular tachycardia (HCC)    Mitral valve disease    CAD in native artery    Lumbar stenosis without neurogenic claudication    Lumbar foraminal stenosis    Back pain    Intractable low back pain    Unable to ambulate    NSTEMI (non-ST elevated myocardial infarction) ([facility]    Moderate protein-calorie malnutrition (HCC)     Meds:     sodium chloride flush  5-40 mL IntraVENous 2 times per day    allopurinol  100 mg Oral Daily    atorvastatin  80 mg Oral Nightly    b complex-C-folic acid  1 mg Oral Nightly    bumetanide  2 mg Oral Once per day on Sun Tue Thu    gabapentin  200 mg Oral TID    hydrALAZINE  10 mg Oral BID    insulin glargine-yfgn  5 Units SubCUTAneous Nightly    isosorbide mononitrate  30 mg Oral Daily    lanthanum  1,000 mg Oral TID WC    latanoprost  1 drop Right Eye Nightly    metoprolol succinate  25 mg Oral Daily    pantoprazole  40 mg Oral QAM AC    brimonidine  1 drop Right Eye BID    And    timolol  1 drop Right Eye BID    ticagrelor  90 mg Oral BID    vancomycin (VANCOCIN) intermittent dosing (placeholder)   Other RX Placeholder    aspirin  81 mg Oral Daily    epoetin fani-epbx  2,000 Units SubCUTAneous Q MWF    alteplase  2 mg IntraCATHeter Once      sodium chloride      dextrose      heparin (PORCINE) Infusion 7 Units/kg/hr (05/05/22 0053)     Meds prn:     sodium chloride flush, sodium chloride, ondansetron **OR** ondansetron, polyethylene glycol, acetaminophen **OR** acetaminophen, lidocaine-prilocaine, oxyCODONE-acetaminophen, fentanNYL, glucose, dextrose, glucagon (rDNA), dextrose, heparin flush, heparin (porcine), heparin (porcine)    Meds prior to admission:     No current facility-administered medications on file prior to encounter. Current Outpatient Medications on File Prior to Encounter   Medication Sig Dispense Refill    vancomycin (VANCOCIN) infusion Infuse 750 mg intravenously three times a week Compound per protocol. 9 g 1    midodrine (PROAMATINE) 5 MG tablet Take 5 mg by mouth daily as needed      gabapentin (NEURONTIN) 100 MG capsule Take 2 capsules by mouth 3 times daily for 180 days. 180 capsule 5    atorvastatin (LIPITOR) 80 MG tablet Take 80 mg by mouth nightly      bumetanide (BUMEX) 2 MG tablet Take 2 mg by mouth three times a week Given Sunday,Tuesday,Thursday      isosorbide mononitrate (IMDUR) 30 MG extended release tablet Take 30 mg by mouth daily      insulin glargine (LANTUS) 100 UNIT/ML injection vial Inject 5 Units into the skin nightly       lidocaine-prilocaine (EMLA) 2.5-2.5 % cream Apply topically as needed for Pain       hydrALAZINE (APRESOLINE) 10 MG tablet Take 1 tablet by mouth 2 times daily 180 tablet 1    metoprolol succinate (TOPROL XL) 25 MG extended release tablet Take 1 tablet by mouth daily 90 tablet 1    lanthanum (FOSRENOL) 1000 MG chewable tablet Take 1,000 mg by mouth 3 times daily (with meals)       allopurinol (ZYLOPRIM) 100 MG tablet Take 1 tablet by mouth daily 90 tablet 1    ticagrelor (BRILINTA) 90 MG TABS tablet Take 1 tablet by mouth 2 times daily 180 tablet 1    B Complex-C-Folic Acid (BONI CAPS) 1 MG CAPS Take 1 mg by mouth nightly       omeprazole (PRILOSEC) 20 MG delayed release capsule Take 20 mg by mouth daily as needed       LUMIGAN 0.01 % SOLN ophthalmic drops Place 1 drop into the right eye nightly       COMBIGAN 0.2-0.5 % ophthalmic solution Place 1 drop into the right eye every 12 hours   7     Allergies:    Furosemide    Social History:     reports that she has never smoked.  She has never used smokeless tobacco. She reports that she does not drink alcohol and does not use drugs. Family History:         Problem Relation Age of Onset    Breast Cancer Mother 61    Hypertension Mother     Heart Disease Father     Prostate Cancer Father     Breast Cancer Maternal Grandmother 61     Physical Exam:    Patient Vitals for the past 24 hrs:   BP Temp Temp src Pulse Resp SpO2 Weight   05/05/22 0840 (!) 120/56 97.9 °F (36.6 °C) Tympanic 62 14 96 % --   05/05/22 0807 (!) 100/36 97.7 °F (36.5 °C) Oral 65 18 100 % --   05/05/22 0237 -- -- -- -- -- -- 165 lb 5.5 oz (75 kg)   05/04/22 2245 (!) 115/30 97.9 °F (36.6 °C) -- 75 18 100 % --   05/04/22 1527 (!) 98/30 98.1 °F (36.7 °C) -- 63 18 98 % --   05/04/22 1330 (!) 111/31 97.7 °F (36.5 °C) Oral 59 29 100 % --   05/04/22 1250 (!) 111/41 98.2 °F (36.8 °C) -- 56 18 -- 165 lb 5.5 oz (75 kg)   05/04/22 1200 (!) 102/45 -- -- 60 -- -- --   05/04/22 1100 86/62 -- -- (!) 43 -- -- --   05/04/22 1030 (!) 92/26 -- -- 54 -- -- --   05/04/22 1000 (!) 91/40 -- -- 55 -- -- --   05/04/22 0930 (!) 125/28 -- -- 54 -- -- --       Intake/Output Summary (Last 24 hours) at 5/5/2022 0901  Last data filed at 5/4/2022 1250  Gross per 24 hour   Intake 300 ml   Output 2200 ml   Net -1900 ml       General: Awake, alert, no acute distress  Neck: No JVD noted  Lungs: Clear bilaterally upper, diminished to the bases bilaterally. Unlabored  CV: Regular rate and rhythm. No rub  Abd: Soft, nontender, nondistended. Active bowel sounds  Skin: Warm and dry. No rash on exposed extremities  Ext: 1+ RUE edema (h/o breast CA);  No BLE edema ; left AV fistula   Neuro: Awake, answers questions appropriately    Data:    Recent Labs     05/03/22  0400 05/04/22  0710 05/05/22  0630   WBC 5.4 4.4* 4.3*   HGB 8.3* 8.9* 9.5*   HCT 28.4* 30.3* 31.9*   MCV 91.6 92.7 92.2    221 224     Recent Labs     05/02/22  1757 05/03/22  0400 05/03/22  1307 05/04/22  0710 05/05/22  0630   NA   < >  --  137 136 137   K  --   --  3.8 4.0 3.5 CL   < >  --  99 98 96*   CO2   < >  --  24 28 30*   CREATININE   < >  --  3.5* 4.4* 3.0*   BUN   < >  --  14 19 10   LABGLOM   < >  --  16 12 19   GLUCOSE   < >  --  59* 56* 72*   CALCIUM   < >  --  8.9 8.5* 8.4*   PHOS  --   --   --  3.2  --    MG  --  2.4  --  2.7*  --     < > = values in this interval not displayed. Vit D, 25-Hydroxy   Date Value Ref Range Status   03/11/2022 28 (L) 30 - 100 ng/mL Final     Comment:     <20 ng/mL. ........... [place] Stands Deficient  20-30 ng/mL. ......... [place] Stands Insufficient   ng/mL. ........ [place] Stands Sufficient  >100 ng/mL. .......... [place] Stands Toxic       PTH   Date Value Ref Range Status   03/11/2022 241 (H) 15 - 65 pg/mL Final     Recent Labs     05/04/22  0710 05/05/22  0630   ALT 12 11   AST 86* 55*   ALKPHOS 124* 126*   BILITOT 0.3 0.4       Recent Labs     05/04/22  0710 05/05/22  0630   LABALBU 2.8* 3.0*       Ferritin   Date Value Ref Range Status   11/09/2017 334 ng/mL Final     Comment:     FERRITIN Reference Ranges:  Adult Males   20 - 60 yrars:    30 - 400 ng/mL  Adult females 17 - 60 years:    13 - 150 ng/mL  Adults greater than 60 years:   no established reference range  Pediatrics:                     no established reference range       Iron   Date Value Ref Range Status   11/09/2017 33 (L) 37 - 145 mcg/dL Final     TIBC   Date Value Ref Range Status   11/09/2017 222 (L) 250 - 450 mcg/dL Final     Vitamin B-12   Date Value Ref Range Status   02/17/2017 1802 (H) 211 - 946 pg/mL Final     Folate   Date Value Ref Range Status   02/17/2017 >20.0 4.8 - 24.2 ng/mL Final     Lab Results   Component Value Date    COLORU Yellow 11/07/2017    NITRU Negative 11/07/2017    GLUCOSEU Negative 11/07/2017    GLUCOSEU NEGATIVE 08/27/2011    KETUA Negative 11/07/2017    UROBILINOGEN 0.2 11/07/2017    BILIRUBINUR Negative 11/07/2017    BILIRUBINUR NEGATIVE 08/27/2011     No results found for: ALVINO, CREURRAN, MACREATRATIO, OSMOU    No components found for: URIC    No results found for: LIPIDPAN    Assessment and Plans: 1. ESRD on HD  OP [facility] MWF 1st shift   left AV fistula  Continue HD MWF    2.  NSTEMI  Troponin 535 on 5/2--> 1388 on 5/3  On heparin drip  For cardiac catheterization today 5/5  Cardiology following    3. Back pain  Status post bone biopsy 4/18 for lumbar spine osteomyelitis  On vancomycin  Neurosurgery following    4. Hypertension  BP goal<140/90  BP at goal  Monitor on current regimen and with HD    5. Anemia  Hemoglobin target 10-12  Hemoglobin 9.5 today  Continue JAYSHREE as hemoglobin below target  Transfuse for hemoglobin<7  Monitor H&H    6.  Secondary hyperparathyroidism of renal origin  PTH noted in the outpatient setting on 4/25/2022 was 443  Phosphorus 3.2 on 5/4  Continue on [address], APRN - CNP     Patient seen and examined all key components of the physical performed independently , case discussed with NP, all pertinent labs and radiologic tests personally reviewed agree with above.     [facility] today with angioplasty of in-stent stenosis of the RCA  Bret Gardner MD

## 2022-05-06 ENCOUNTER — APPOINTMENT (OUTPATIENT)
Dept: CT IMAGING | Age: 67
DRG: 250 | End: 2022-05-06
Payer: COMMERCIAL

## 2022-05-06 LAB
ALBUMIN SERPL-MCNC: 2.9 G/DL (ref 3.5–5.2)
ALP BLD-CCNC: 127 U/L (ref 35–104)
ALT SERPL-CCNC: 11 U/L (ref 0–32)
ANION GAP SERPL CALCULATED.3IONS-SCNC: 11 MMOL/L (ref 7–16)
AST SERPL-CCNC: 37 U/L (ref 0–31)
BILIRUB SERPL-MCNC: 0.3 MG/DL (ref 0–1.2)
BUN BLDV-MCNC: 17 MG/DL (ref 6–23)
CALCIUM SERPL-MCNC: 8.5 MG/DL (ref 8.6–10.2)
CHLORIDE BLD-SCNC: 97 MMOL/L (ref 98–107)
CO2: 25 MMOL/L (ref 22–29)
CREAT SERPL-MCNC: 3.8 MG/DL (ref 0.5–1)
GFR AFRICAN AMERICAN: 14
GFR NON-AFRICAN AMERICAN: 14 ML/MIN/1.73
GLUCOSE BLD-MCNC: 104 MG/DL (ref 74–99)
HCT VFR BLD CALC: 31.3 % (ref 34–48)
HEMOGLOBIN: 9.3 G/DL (ref 11.5–15.5)
MCH RBC QN AUTO: 27 PG (ref 26–35)
MCHC RBC AUTO-ENTMCNC: 29.7 % (ref 32–34.5)
MCV RBC AUTO: 91 FL (ref 80–99.9)
METER GLUCOSE: 124 MG/DL (ref 74–99)
PDW BLD-RTO: 18.2 FL (ref 11.5–15)
PLATELET # BLD: 213 E9/L (ref 130–450)
PMV BLD AUTO: 10 FL (ref 7–12)
POTASSIUM SERPL-SCNC: 4 MMOL/L (ref 3.5–5)
RBC # BLD: 3.44 E12/L (ref 3.5–5.5)
SODIUM BLD-SCNC: 133 MMOL/L (ref 132–146)
TOTAL PROTEIN: 5.8 G/DL (ref 6.4–8.3)
VANCOMYCIN RANDOM: 17.4 MCG/ML (ref 5–40)
WBC # BLD: 5.5 E9/L (ref 4.5–11.5)

## 2022-05-06 PROCEDURE — 6370000000 HC RX 637 (ALT 250 FOR IP): Performed by: INTERNAL MEDICINE

## 2022-05-06 PROCEDURE — 6360000002 HC RX W HCPCS: Performed by: INTERNAL MEDICINE

## 2022-05-06 PROCEDURE — 2580000003 HC RX 258: Performed by: INTERNAL MEDICINE

## 2022-05-06 PROCEDURE — 72131 CT LUMBAR SPINE W/O DYE: CPT

## 2022-05-06 PROCEDURE — 85027 COMPLETE CBC AUTOMATED: CPT

## 2022-05-06 PROCEDURE — 99232 SBSQ HOSP IP/OBS MODERATE 35: CPT | Performed by: INTERNAL MEDICINE

## 2022-05-06 PROCEDURE — 80202 ASSAY OF VANCOMYCIN: CPT

## 2022-05-06 PROCEDURE — S5553 INSULIN LONG ACTING 5 U: HCPCS | Performed by: INTERNAL MEDICINE

## 2022-05-06 PROCEDURE — 90935 HEMODIALYSIS ONE EVALUATION: CPT

## 2022-05-06 PROCEDURE — 36415 COLL VENOUS BLD VENIPUNCTURE: CPT

## 2022-05-06 PROCEDURE — 80053 COMPREHEN METABOLIC PANEL: CPT

## 2022-05-06 PROCEDURE — 82962 GLUCOSE BLOOD TEST: CPT

## 2022-05-06 PROCEDURE — 2140000000 HC CCU INTERMEDIATE R&B

## 2022-05-06 RX ORDER — HEPARIN SODIUM 10000 [USP'U]/ML
5000 INJECTION, SOLUTION INTRAVENOUS; SUBCUTANEOUS 2 TIMES DAILY
Status: DISCONTINUED | OUTPATIENT
Start: 2022-05-06 | End: 2022-05-12 | Stop reason: HOSPADM

## 2022-05-06 RX ADMIN — OXYCODONE AND ACETAMINOPHEN 1 TABLET: 5; 325 TABLET ORAL at 14:37

## 2022-05-06 RX ADMIN — TIMOLOL MALEATE 1 DROP: 5 SOLUTION OPHTHALMIC at 21:29

## 2022-05-06 RX ADMIN — LANTHANUM CARBONATE 1000 MG: 500 TABLET, CHEWABLE ORAL at 18:03

## 2022-05-06 RX ADMIN — ATORVASTATIN CALCIUM 80 MG: 80 TABLET, FILM COATED ORAL at 21:22

## 2022-05-06 RX ADMIN — TICAGRELOR 90 MG: 90 TABLET ORAL at 23:46

## 2022-05-06 RX ADMIN — GABAPENTIN 200 MG: 100 CAPSULE ORAL at 14:18

## 2022-05-06 RX ADMIN — ASPIRIN 81 MG: 81 TABLET, COATED ORAL at 14:18

## 2022-05-06 RX ADMIN — PANTOPRAZOLE SODIUM 40 MG: 40 TABLET, DELAYED RELEASE ORAL at 14:18

## 2022-05-06 RX ADMIN — LATANOPROST 1 DROP: 50 SOLUTION OPHTHALMIC at 21:26

## 2022-05-06 RX ADMIN — GABAPENTIN 200 MG: 100 CAPSULE ORAL at 21:22

## 2022-05-06 RX ADMIN — NEPHROCAP 1 MG: 1 CAP ORAL at 21:22

## 2022-05-06 RX ADMIN — BRIMONIDINE TARTRATE 1 DROP: 2 SOLUTION OPHTHALMIC at 21:29

## 2022-05-06 RX ADMIN — METOPROLOL SUCCINATE 25 MG: 25 TABLET, EXTENDED RELEASE ORAL at 14:18

## 2022-05-06 RX ADMIN — TICAGRELOR 90 MG: 90 TABLET ORAL at 14:18

## 2022-05-06 RX ADMIN — VANCOMYCIN HYDROCHLORIDE 1250 MG: 10 INJECTION, POWDER, LYOPHILIZED, FOR SOLUTION INTRAVENOUS at 14:31

## 2022-05-06 RX ADMIN — INSULIN GLARGINE-YFGN 5 UNITS: 100 INJECTION, SOLUTION SUBCUTANEOUS at 21:36

## 2022-05-06 RX ADMIN — SODIUM CHLORIDE, PRESERVATIVE FREE 10 ML: 5 INJECTION INTRAVENOUS at 14:18

## 2022-05-06 RX ADMIN — HEPARIN SODIUM 5000 UNITS: 10000 INJECTION INTRAVENOUS; SUBCUTANEOUS at 21:22

## 2022-05-06 RX ADMIN — ALLOPURINOL 100 MG: 100 TABLET ORAL at 14:18

## 2022-05-06 RX ADMIN — EPOETIN ALFA-EPBX 2000 UNITS: 2000 INJECTION, SOLUTION INTRAVENOUS; SUBCUTANEOUS at 14:19

## 2022-05-06 ASSESSMENT — PAIN DESCRIPTION - ORIENTATION: ORIENTATION: RIGHT

## 2022-05-06 ASSESSMENT — PAIN - FUNCTIONAL ASSESSMENT: PAIN_FUNCTIONAL_ASSESSMENT: PREVENTS OR INTERFERES WITH ALL ACTIVE AND SOME PASSIVE ACTIVITIES

## 2022-05-06 ASSESSMENT — PAIN DESCRIPTION - LOCATION: LOCATION: HIP

## 2022-05-06 ASSESSMENT — PAIN SCALES - GENERAL
PAINLEVEL_OUTOF10: 7
PAINLEVEL_OUTOF10: 0

## 2022-05-06 ASSESSMENT — PAIN DESCRIPTION - FREQUENCY: FREQUENCY: CONTINUOUS

## 2022-05-06 ASSESSMENT — PAIN DESCRIPTION - DESCRIPTORS: DESCRIPTORS: ACHING;SHARP;SHOOTING;SORE;THROBBING

## 2022-05-06 ASSESSMENT — PAIN DESCRIPTION - PAIN TYPE: TYPE: CHRONIC PAIN

## 2022-05-06 ASSESSMENT — PAIN DESCRIPTION - ONSET: ONSET: ON-GOING

## 2022-05-06 NOTE — PROGRESS NOTES
Pharmacy Consultation Note  (Antibiotic Dosing and Monitoring)    Initial consult date: 5/3/22  Consulting physician/provider: Dr. Sheryle Hopping  Drug: Vancomycin  Indication: Osteomyelitis     Age/  Gender Height Weight IBW  Allergy Information   67 y.o./female 5' 10\" (177.8 cm)  5' 10\" (177.8 cm) 163 lb (73.9 kg)     Ideal body weight: 68.5 kg (151 lb 0.2 oz)  Adjusted ideal body weight: 72.1 kg (158 lb 13.8 oz)   Furosemide      Renal Function:  Recent Labs     05/04/22  0710 05/05/22  0630 05/06/22  0745   BUN 19 10 17   CREATININE 4.4* 3.0* 3.8*       Intake/Output Summary (Last 24 hours) at 5/6/2022 0928  Last data filed at 5/6/2022 0023  Gross per 24 hour   Intake 0 ml   Output 1 ml   Net -1 ml       Vancomycin Monitoring:  Trough:  No results for input(s): VANCOTROUGH in the last 72 hours. Random:    Recent Labs     05/04/22  0710 05/05/22  0630 05/06/22  0745   VANCORANDOM 18.8 19.7 17.4       Vancomycin Administration Times:    Date  SCr/CrCl       or HD Drug/Dose Time   Given Level(s)   (Time)   5/3 No HD -- -- 22.9   (0400)   5/4 HD x4 hrs Vancomycin 1000 mg IV x1 <1700> 18.8   (0710)   5/5 No HD -- -- 19.7   (0630)   5/6 HD x 4 hrs Vancomycin 1250 IV x 1 <1300> 17.4  (0745)     Assessment:  · Patient is a 78 y.o. female who has been continued on vancomycin for Hx osteomyelitis from last admission  · Receiving vancomycin 750 mg every MWF with dialysis x 6 weeks (End date 6/1/2022) per ID  Estimated Creatinine Clearance: 16 mL/min (A) (based on SCr of 3.8 mg/dL (H)). · To dose vancomycin, pharmacy will be utilizing dosing based off of levels due to patient requiring hemodialysis   · Unclear if dose of vancomycin was given yesterday afternoon (5/4). Patient told RN that she received a dose in dialysis, but no dose was charted. · 5/5: AM level 19.7- it appears that patient did receive dose of vanco, but it was not charted in dialysis  · 5/6: pre-HD level 17.4 mcg/mL.  HD x 4 hours today      Plan:  · Vancomycin 1250 mg IV x1 post-HD  · Will check pre-HD vancomycin level before next HD session (5/8/22)  · Will continue to monitor renal function   · Clinical pharmacy to follow      Fouzia Alex [facility] 5/6/2022 9:28 AM

## 2022-05-06 NOTE — PROGRESS NOTES
Physical Therapy  Name: Anthony Sales  Room: 3586/3614-C  Date: 05/06/22    PT viri on hold. Awaiting neurosurgery POC regarding intervention plans.      Deacon Hayes, PT, DPT  [id number]

## 2022-05-06 NOTE — FLOWSHEET NOTE
05/06/22 1150   Vital Signs   BP (!) 166/44   Temp 98 °F (36.7 °C)   Pulse 60   Resp 16   Weight 168 lb 6.9 oz (76.4 kg)   Weight Method Bed scale   Percent Weight Change -1.29   Post-Hemodialysis Assessment   Post-Treatment Procedures Blood returned; Access bleeding time < 10 minutes   Rinseback Volume (ml) 300 ml   Total Liters Processed (l/min) 90.9 l/min   Dialyzer Clearance Clear   Duration of Treatment (minutes) 240 minutes   NET Removed (ml) 1300 ml   Tolerated Treatment Good   Patient Response to Treatment pt tolerated tx well, rinsed back w/ 300ml NS, sites held, gauze and tape applied per policy, pt returned to room

## 2022-05-06 NOTE — PROGRESS NOTES
Inpatient Cardiology Progress note     PATIENT IS BEING FOLLOWED FOR: NSTEMI    Emeka Ray is a 78 y.o. female who follows with Dr. Court Morales: Complains of right leg pain. Denies CP or SOB. OBJECTIVE: Seen in dialysis. Resting comfortably in no apparent distress     ROS:  Consist: Denies fevers, chills or night sweats  Heart: Denies chest pain, palpitations, lightheadedness, dizziness or syncope  Lungs: Denies SOB, cough, wheezing, orthopnea or PND  GI: Denies abdominal pain, vomiting or diarrhea    PHYSICAL EXAM:   BP (!) 132/44   Pulse 58   Temp 97.4 °F (36.3 °C)   Resp 16   Ht 5' 10\" (1.778 m)   Wt 170 lb 10.2 oz (77.4 kg)   SpO2 99%   BMI 24.48 kg/m²    B/P Range last 24 hours: Systolic (11PJY), GVB:019 , Min:77 , DGA:218    Diastolic (84MSL), WMA:13, Min:24, Max:73    CONST: Well developed, well nourished AA female who appears stated age. Awake, alert and cooperative. No apparent distress  HEENT:   Head- Normocephalic, atraumatic   Eyes- Conjunctivae pink, anicteric  Throat- Oral mucosa pink and moist  Neck-  No stridor, trachea midline, no jugular venous distention. No carotid bruit  CHEST: Chest symmetrical and non-tender to palpation. No accessory muscle use or intercostal retractions  RESPIRATORY:  Lung sounds - clear throughout fields   CARDIOVASCULAR:     Heart Inspection- shows no noted pulsations  Heart Palpation- no heaves or thrills; PMI is non-displaced   Heart Ausculation- Regular rate and rhythm, no murmur. No s3, s4 or rub   PV: No lower extremity edema. No varicosities. Pedal pulses palpable, no clubbing or cyanosis. Right groin site of access without hematoma and with preserved pulse  ABDOMEN: Soft, non-tender to light palpation. Bowel sounds present. No palpable masses no organomegaly; no abdominal bruit  MS: Good muscle strength and tone. No atrophy or abnormal movements.    : Deferred  SKIN: Warm and dry no statis dermatitis or ulcers   NEURO / PSYCH: Oriented to person, place and time. Speech clear and appropriate. Follows all commands.  Pleasant affect       Intake/Output Summary (Last 24 hours) at 5/6/2022 0919  Last data filed at 5/6/2022 0023  Gross per 24 hour   Intake 0 ml   Output 1 ml   Net -1 ml       Weight:   Wt Readings from Last 3 Encounters:   05/06/22 170 lb 10.2 oz (77.4 kg)   05/02/22 163 lb (73.9 kg)   04/22/22 178 lb 2.1 oz (80.8 kg)     Current Inpatient Medications:   sodium chloride flush  5-40 mL IntraVENous 2 times per day    allopurinol  100 mg Oral Daily    atorvastatin  80 mg Oral Nightly    b complex-C-folic acid  1 mg Oral Nightly    bumetanide  2 mg Oral Once per day on Sun Tue Thu    gabapentin  200 mg Oral TID    insulin glargine-yfgn  5 Units SubCUTAneous Nightly    lanthanum  1,000 mg Oral TID WC    latanoprost  1 drop Right Eye Nightly    metoprolol succinate  25 mg Oral Daily    pantoprazole  40 mg Oral QAM AC    brimonidine  1 drop Right Eye BID    And    timolol  1 drop Right Eye BID    ticagrelor  90 mg Oral BID    vancomycin (VANCOCIN) intermittent dosing (placeholder)   Other RX Placeholder    aspirin  81 mg Oral Daily    epoetin fani-epbx  2,000 Units SubCUTAneous Q MWF    alteplase  2 mg IntraCATHeter Once       IV Infusions (if any):   sodium chloride      sodium chloride      dextrose         DIAGNOSTIC/ LABORATORY DATA:  Labs:   CBC:   Recent Labs     05/05/22  0630 05/06/22  0745   WBC 4.3* 5.5   HGB 9.5* 9.3*   HCT 31.9* 31.3*    213     BMP:   Recent Labs     05/05/22  0630 05/06/22  0745    133   K 3.5 4.0   CO2 30* 25   BUN 10 17   CREATININE 3.0* 3.8*   LABGLOM 19 14   CALCIUM 8.4* 8.5*     Mag:   Recent Labs     05/04/22  0710   MG 2.7*     Phos:   Recent Labs     05/04/22  0710   PHOS 3.2     TFT:   Lab Results   Component Value Date    TSH 3.120 05/04/2022    G8WPVVG 5.0 12/24/2012    T4FREE 1.58 05/19/2021      HgA1c:   Lab Results   Component Value Date    LABA1C 5.1 05/04/2022 APTT:  Recent Labs     05/04/22  2350 05/05/22  0630   APTT 95.8* 98.6*     CARDIAC ENZYMES:  No results for input(s): CKTOTAL, CKMB, CKMBINDEX, TROPHS in the last 72 hours. FASTING LIPID PANEL:  Lab Results   Component Value Date    CHOL 103 05/04/2022    HDL 42 05/04/2022    LDLCALC 48 05/04/2022    TRIG 66 05/04/2022     LIVER PROFILE:  Recent Labs     05/05/22  0630 05/06/22  0745   AST 55* 37*   ALT 11 11   LABALBU 3.0* 2.9*       CXR 5/2/22:   FINDINGS:  Unchanged implanted central venous catheter on the right and thoracic spinal neurostimulator.  Airspace disease has resolved.  I suspect that this represented cardiogenic edema.  Heart is enlarged.  Pulmonary vascularity is normal.  Neither costophrenic angle is blunted. CTA pulmonary 5/2/22:  No evidence of pulmonary embolism or acute thoracic aortic abnormality.   Cardiomegaly.  Minimal left basilar atelectasis.  Otherwise, clear lungs.   Destructive process of the upper lumbar spine at the L1-L2 level, only partially imaged.  This is seen to better extent on the lumbar spine MRI of the same day. CT lumbar spine 5/2/22:  Again seen are severe erosive changes at the L1-2 and L2-3 endplates, highly suggestive discitis-osteomyelitis.   Associated spondylolisthesis and inflammatory change again results in severe spinal stenosis at L2-3 and moderate spinal stenosis at L1-2.   Further evaluation with contrast enhanced MRI of the lumbar spine is recommended. 12 lead EKG 5/5/22: Sinus bradycardia. Cannot rule out Anterior infarct , age undetermined. ST & T wave abnormality, consider inferior and lateral ischemia    Cath / PCI 5/5/22 ( Dr. Valeria Sal ):  IMPRESSION:  1. In-stent stenosis of mid RCA with successful balloon angioplasty (pre-PCI LATOSHA-3 flow, post-PCI LATOSHA-3 flow, pre-PCI stenosis 90%, post-PCI stenosis less than 10%). 2.  Patent stent in the LAD. 3.  Totally occluded left circumflex artery.       Telemetry: Sinus bradycardia      ASSESSMENT:   1. NSTEMI 5/2/22. CAD with h/o LAD and RCA stents 5/2021 and with cath 12/21 showing patent LAD stent, hi grade  focal in stent restenosis of the RCA and known  of OM2.  S/p PCI for RCA in stent restenosis 5/5/22  2. 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 Insulin requiring DM with neuropathy, retinopathy and nephropathy  5. ESRD on hemodialysis  6. Borderline low BP. H/o HTN  7. HLD  8. Jehovah Witness  9. Bladder carcinoma s/p chemotherapy  10. Osteomyelitis of lumbar spine L1-L3 s/p bone marrow bx and fusion 4/19/2022. H/o  DDD lumbar spine with h/o lumbar laminectomy and spinal cord stimulator with intractable back pain  11. S/p amputation of left hand distal middle finger for osteomyelitis  12. Chronic anemia         PLAN:  1. Continue current cardiac medications  2. Rest as per the primary service +/- other consultants  3. May be discharged from cardiology stand point when OK with others. Cardiology will sign off. Please call if needed.     Electronically signed by Kishan Mendosa MD on 5/6/2022 at 9:19 AM

## 2022-05-06 NOTE — PROGRESS NOTES
[facility]  Nephrology Progress Note    Patient's Name: Rose Song      History of Present Illness from 5/3 consult note:    Valeria Mondragon is a 78 y.o. female with a past medical history of breast cancer, coronary artery disease, hypertension, hyperlipidemia, and anemia. She presented to the [address] [facility] ED on 5/2 with complaints of back and abdominal pain patient was subsequently transferred to [facility] for NSTEMI. Vital signs today include temperature 97.5, respirations 15, pulse 61, BP 98/42, and she was 94% on room air. Lab data from 5/2 include creatinine 2.5, troponin 535, and hemoglobin 9.1. Cardiology is consulted to see the patient. We were consulted to see the patient for dialysis. Patient is known to our service and dialyzes at [address] MWF 1st shift left AV fistula. At present, patient was seen and examined. She reports that she feels \"fair. \"  She reports right hip pain today. She reports that her appetite was fair before coming in to the hospital.  She denied any chest pain or shortness of breath. She denies any nausea, vomiting, or diarrhea. \"    Subjective:    5/6: Patient was seen and examined on HD. She reports that she feels [place]. \"  She reports her appetite is okay. She denies any issues overnight. Denies any chest pain or shortness of breath. No abdominal pain or nausea. PMH:    Past Medical History:   Diagnosis Date    Acute infection of bone ([address]     infection of rt foot, resolved.     Acute osteomyelitis of phalanx of left hand ([address] 1/27/2022    Left third distal phalanx    Anemia of chronic disease     Arthritis     Breast cancer ([address]     right breast, 2008/ bladder, 2006- last chemotherapy \"years ago\"    CAD (coronary artery disease)     Carpal tunnel syndrome     bilat - for OR left hand 3-17-20     Chronic diastolic CHF (congestive heart failure) ([address] 09/23/2014 9/23/14- echocardiogram revealed moderate LV concentric hypertrophy, stage III diastolic dysfunction, mild tricuspid regurgitation    CKD (chronic kidney disease) stage 4, GFR 15-29 ml/min (HCC)     Diabetic retinopathy ([address]     Glaucoma     Hemodialysis patient ([address]     [facility] wed fri- Dr. Herminia Nguyen - left arm fistula     Hyperkalemia, diminished renal excretion 11/9/2017    Hyperlipidemia     Hypertension     Hypoglycemia unawareness in type 1 diabetes mellitus ([address] 11/7/2017    Insulin dependent type 2 diabetes mellitus ([address]     Neuropathy     feet    Osteomyelitis due to secondary diabetes ([address]     rt great toe with amputation    Patient is Taoism 11/7/2017    Refusal of blood product     patient states she dose not take blood transfusion    Ventricular hypertrophy     Vitreous hemorrhage (HCC)     left eye     Patient Active Problem List   Diagnosis    Diabetic retinopathy ([address]    Malignant neoplasm of right female breast ([address]    Atherosclerosis of native coronary artery of native heart without angina pectoris    Moderate obesity    Left ventricular hypertrophy    Herniated lumbar intervertebral disc    Lumbar degenerative disc disease    Pseudomeningocele    Lumbar radiculopathy    Lymphedema of arm    CKD (chronic kidney disease) stage 4, GFR 15-29 ml/min (HCC)    Insulin dependent type 2 diabetes mellitus (HCC)    Anemia of chronic disease    Chronic diastolic CHF (congestive heart failure) (HCC)    Neuropathy    Hypertension    Glaucoma    Refusal of blood product    Pancytopenia ([address]    Controlled type 2 diabetes mellitus with chronic kidney disease on chronic dialysis, with long-term current use of insulin (HCC)    Vitreous hemorrhage ([address]    Patient is Taoism    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 ([address]    Hyperkalemia, diminished renal excretion    Chronic pain syndrome    Lumbar post-laminectomy syndrome    Myalgia    Cervicalgia    Diabetic peripheral neuropathy (HCC)    ESRD (end stage renal disease) [facility]    Bilateral carpal tunnel syndrome    Spinal stenosis of lumbar region with neurogenic claudication    Cardiac arrest ([facility]    ESRD (end stage renal disease) on dialysis ([facility]    Mixed hyperlipidemia    Lymphedema of right upper extremity    Coronary artery disease involving native coronary artery of native heart with angina pectoris (HCC)    Ventricular tachycardia (HCC)    Mitral valve disease    CAD in native artery    Lumbar stenosis without neurogenic claudication    Lumbar foraminal stenosis    Back pain    Intractable low back pain    Unable to ambulate    NSTEMI (non-ST elevated myocardial infarction) ([facility]    Moderate protein-calorie malnutrition (HCC)     Meds:     sodium chloride flush  5-40 mL IntraVENous 2 times per day    allopurinol  100 mg Oral Daily    atorvastatin  80 mg Oral Nightly    b complex-C-folic acid  1 mg Oral Nightly    bumetanide  2 mg Oral Once per day on Sun Tue Thu    gabapentin  200 mg Oral TID    insulin glargine-yfgn  5 Units SubCUTAneous Nightly    lanthanum  1,000 mg Oral TID WC    latanoprost  1 drop Right Eye Nightly    metoprolol succinate  25 mg Oral Daily    pantoprazole  40 mg Oral QAM AC    brimonidine  1 drop Right Eye BID    And    timolol  1 drop Right Eye BID    ticagrelor  90 mg Oral BID    vancomycin (VANCOCIN) intermittent dosing (placeholder)   Other RX Placeholder    aspirin  81 mg Oral Daily    epoetin fani-epbx  2,000 Units SubCUTAneous Q MWF    alteplase  2 mg IntraCATHeter Once      sodium chloride      sodium chloride      dextrose       Meds prn:     sodium chloride flush, sodium chloride, acetaminophen, midodrine, sodium chloride, ondansetron **OR** ondansetron, polyethylene glycol, [DISCONTINUED] acetaminophen **OR** acetaminophen, lidocaine-prilocaine, oxyCODONE-acetaminophen, fentanNYL, glucose, dextrose, glucagon (rDNA), dextrose, heparin flush    Meds prior to admission:     No current facility-administered medications on file prior to encounter. Current Outpatient Medications on File Prior to Encounter   Medication Sig Dispense Refill    vancomycin (VANCOCIN) infusion Infuse 750 mg intravenously three times a week Compound per protocol. 9 g 1    midodrine (PROAMATINE) 5 MG tablet Take 5 mg by mouth daily as needed      gabapentin (NEURONTIN) 100 MG capsule Take 2 capsules by mouth 3 times daily for 180 days. 180 capsule 5    atorvastatin (LIPITOR) 80 MG tablet Take 80 mg by mouth nightly      bumetanide (BUMEX) 2 MG tablet Take 2 mg by mouth three times a week Given Sunday,Tuesday,Thursday      isosorbide mononitrate (IMDUR) 30 MG extended release tablet Take 30 mg by mouth daily      insulin glargine (LANTUS) 100 UNIT/ML injection vial Inject 5 Units into the skin nightly       lidocaine-prilocaine (EMLA) 2.5-2.5 % cream Apply topically as needed for Pain       hydrALAZINE (APRESOLINE) 10 MG tablet Take 1 tablet by mouth 2 times daily 180 tablet 1    metoprolol succinate (TOPROL XL) 25 MG extended release tablet Take 1 tablet by mouth daily 90 tablet 1    lanthanum (FOSRENOL) 1000 MG chewable tablet Take 1,000 mg by mouth 3 times daily (with meals)       allopurinol (ZYLOPRIM) 100 MG tablet Take 1 tablet by mouth daily 90 tablet 1    ticagrelor (BRILINTA) 90 MG TABS tablet Take 1 tablet by mouth 2 times daily 180 tablet 1    B Complex-C-Folic Acid (BONI CAPS) 1 MG CAPS Take 1 mg by mouth nightly       omeprazole (PRILOSEC) 20 MG delayed release capsule Take 20 mg by mouth daily as needed       LUMIGAN 0.01 % SOLN ophthalmic drops Place 1 drop into the right eye nightly       COMBIGAN 0.2-0.5 % ophthalmic solution Place 1 drop into the right eye every 12 hours   7     Allergies:    Furosemide    Social History:     reports that she has never smoked.  She has never used smokeless tobacco. She reports that she does not drink alcohol and does not use drugs. Family History:         Problem Relation Age of Onset    Breast Cancer Mother 61    Hypertension Mother     Heart Disease Father     Prostate Cancer Father     Breast Cancer Maternal Grandmother 61     Physical Exam:    Patient Vitals for the past 24 hrs:   BP Temp Temp src Pulse Resp SpO2 Weight   05/06/22 0800 (!) 112/39 -- -- 53 -- -- --   05/06/22 0745 (!) 112/36 97.4 °F (36.3 °C) -- -- -- -- 170 lb 10.2 oz (77.4 kg)   05/06/22 0131 -- -- -- -- -- -- 168 lb (76.2 kg)   05/05/22 2130 122/73 97.9 °F (36.6 °C) Oral 97 16 -- --   05/05/22 1655 (!) 120/59 97.5 °F (36.4 °C) Oral 54 16 99 % --   05/05/22 1518 (!) 94/24 -- -- 54 16 -- --   05/05/22 1445 (!) 84/33 -- -- 54 18 -- --   05/05/22 1345 (!) 77/49 -- -- 54 18 -- --   05/05/22 1300 (!) 93/45 -- -- 56 16 -- --   05/05/22 1215 (!) 112/34 -- -- 55 16 -- --   05/05/22 1130 110/69 -- -- 58 16 -- --   05/05/22 1055 109/63 -- -- 57 16 -- --   05/05/22 1041 (!) 118/53 -- -- 56 16 -- --   05/05/22 0840 (!) 120/56 97.9 °F (36.6 °C) Tympanic 62 14 96 % --       Intake/Output Summary (Last 24 hours) at 5/6/2022 0824  Last data filed at 5/6/2022 0023  Gross per 24 hour   Intake 0 ml   Output 1 ml   Net -1 ml     General: Awake, alert, no acute distress  Neck: No JVD noted  Lungs: Clear bilaterally upper, diminished to the bases bilaterally. Unlabored  CV: Regular rate and rhythm. No rub  Abd: Soft, nontender, nondistended. Active bowel sounds  Skin: Warm and dry. No rash on exposed extremities  Ext: 1+ RUE edema (h/o breast CA);  No BLE edema ; left AV fistula   Neuro: Awake, answers questions appropriately    Data:    Recent Labs     05/04/22  0710 05/05/22  0630 05/06/22  0745   WBC 4.4* 4.3* 5.5   HGB 8.9* 9.5* 9.3*   HCT 30.3* 31.9* 31.3*   MCV 92.7 92.2 91.0    224 213     Recent Labs     05/03/22  1307 05/04/22  0710 05/05/22  0630    136 137   K 3.8 4.0 3.5   CL 99 98 96*   CO2 24 28 30*   CREATININE 3.5* 4. 4* 3.0*   BUN 14 19 10   LABGLOM 16 12 19   GLUCOSE 59* 56* 72*   CALCIUM 8.9 8.5* 8.4*   PHOS  --  3.2  --    MG  --  2.7*  --      Vit D, 25-Hydroxy   Date Value Ref Range Status   03/11/2022 28 (L) 30 - 100 ng/mL Final     Comment:     <20 ng/mL. ........... Shaaron Money Deficient  20-30 ng/mL. ......... Shaaron Money Insufficient   ng/mL. ........ Shaaron Money Sufficient  >100 ng/mL. .......... Shaaron Money Toxic       PTH   Date Value Ref Range Status   03/11/2022 241 (H) 15 - 65 pg/mL Final     Recent Labs     05/04/22  0710 05/05/22  0630   ALT 12 11   AST 86* 55*   ALKPHOS 124* 126*   BILITOT 0.3 0.4     Recent Labs     05/04/22  0710 05/05/22  0630   LABALBU 2.8* 3.0*     Ferritin   Date Value Ref Range Status   11/09/2017 334 ng/mL Final     Comment:     FERRITIN Reference Ranges:  Adult Males   20 - 60 yrars:    30 - 400 ng/mL  Adult females 17 - 60 years:    13 - 150 ng/mL  Adults greater than 60 years:   no established reference range  Pediatrics:                     no established reference range       Iron   Date Value Ref Range Status   11/09/2017 33 (L) 37 - 145 mcg/dL Final     TIBC   Date Value Ref Range Status   11/09/2017 222 (L) 250 - 450 mcg/dL Final     Vitamin B-12   Date Value Ref Range Status   02/17/2017 1802 (H) 211 - 946 pg/mL Final     Folate   Date Value Ref Range Status   02/17/2017 >20.0 4.8 - 24.2 ng/mL Final     Lab Results   Component Value Date    COLORU Yellow 11/07/2017    NITRU Negative 11/07/2017    GLUCOSEU Negative 11/07/2017    GLUCOSEU NEGATIVE 08/27/2011    KETUA Negative 11/07/2017    UROBILINOGEN 0.2 11/07/2017    BILIRUBINUR Negative 11/07/2017    BILIRUBINUR NEGATIVE 08/27/2011     No results found for: ALVINO, CREURRAN, MACREATRATIO, OSMOU    No components found for: URIC    No results found for: LIPIDPAN    Assessment and Plans:    1.   ESRD on HD  OP [facility] MWF 1st shift   left AV fistula  Continue HD MWF    2.  NSTEMI  Troponin 535 on 5/2--> 1388 on 5/3  On heparin drip  S/p cardiac catheterization with balloon angioplasty 5/5  Cardiology following    3. Back pain  Status post bone biopsy 4/18 for lumbar spine osteomyelitis  On vancomycin  Neurosurgery following    4. Hypertension  BP goal<140/90  BP at goal  Monitor on current regimen and with HD    5. Anemia  Hemoglobin target 10-12  Hemoglobin 9.3 today  Continue JAYSHREE as hemoglobin below target  Transfuse for hemoglobin<7  Monitor H&H    6.  Secondary hyperparathyroidism of renal origin  PTH noted in the outpatient setting on 4/25/2022 was 443  Phosphorus 3.2 on 5/4  Continue on Fosrenol  Monitor labs    Of note, no BMP available today at the time of this note.     GOYO Talavera - CNP     Pt seen and examined agree with above  Hd today  Sp plasty cardiac  Isiah Hoskins MD

## 2022-05-06 NOTE — PROGRESS NOTES
Occupational Therapy    OT order received. Chart reviewed, Await NS consult and POC. Will re-attempt eval when appropriate.      Froylan Post, [address] [facility], OTR/L [id number]

## 2022-05-06 NOTE — PROGRESS NOTES
Pt off unit for dialysis. Unable to obtain vitals or perform assessment. Will complete upon return to unit.

## 2022-05-06 NOTE — PROGRESS NOTES
Hospitalist Progress Note      PCP: Keyanna Howard MD    Date of Admission: 5/2/2022    Hospital Course:   \" 78 y.o. female who past medical history that includes lumbar osteomyelitis, coronary artery disease, ESRD, anemia, hypertension, hyperlipidemia, diabetes, history of bladder carcinoma presented with back pain and abdominal pain for several weeks and is worsening over the past few days. She was recently admitted due to osteomyelitis. She was recently discharged to rehab  on vancomycin. In the ER, there was concern for STEMI on EKG. Interventional cardiology was consulted from the ER and they advised no acute STEMI on EKG however they will plan to do a PCI in the morning. She was placed on heparin drip. He was transferred to see Derek Reddy for continued work-up. ER also spoke with neurosurgeon, Dr. Rachelle Gregg for continued signs of osteomyelitis. In ER, routine labwork was performed which revealed creatinine 2.5, troponin 323->535. EKG revealed atrial fibrillationST elevation noted in leads aVR ST depressions noted in leads V4 through V6 as well as in leads I to, ST elevation in leads V1. Repeat EKG. The patient received heparin drip in the emergency room and was admitted under the care of [facility] physicians. \"       Subjective:    Patient was seen while she was on dialysis. Pt complains of RLE shooting pains and weakness. She states she cannot stand.     Medications:  Reviewed    Infusion Medications    sodium chloride      sodium chloride      dextrose       Scheduled Medications    vancomycin  1,250 mg IntraVENous Once    sodium chloride flush  5-40 mL IntraVENous 2 times per day    allopurinol  100 mg Oral Daily    atorvastatin  80 mg Oral Nightly    b complex-C-folic acid  1 mg Oral Nightly    bumetanide  2 mg Oral Once per day on Sun Tue Thu    gabapentin  200 mg Oral TID    insulin glargine-yfgn  5 Units SubCUTAneous Nightly    lanthanum  1,000 mg Oral TID WC    latanoprost  1 drop Right Eye Nightly    metoprolol succinate  25 mg Oral Daily    pantoprazole  40 mg Oral QAM AC    brimonidine  1 drop Right Eye BID    And    timolol  1 drop Right Eye BID    ticagrelor  90 mg Oral BID    vancomycin (VANCOCIN) intermittent dosing (placeholder)   Other RX Placeholder    aspirin  81 mg Oral Daily    epoetin fani-epbx  2,000 Units SubCUTAneous Q MWF    alteplase  2 mg IntraCATHeter Once     PRN Meds: sodium chloride flush, sodium chloride, acetaminophen, midodrine, sodium chloride, ondansetron **OR** ondansetron, polyethylene glycol, [DISCONTINUED] acetaminophen **OR** acetaminophen, lidocaine-prilocaine, oxyCODONE-acetaminophen, fentanNYL, glucose, dextrose, glucagon (rDNA), dextrose, heparin flush      Intake/Output Summary (Last 24 hours) at 5/6/2022 1200  Last data filed at 5/6/2022 0835  Gross per 24 hour   Intake 120 ml   Output 1 ml   Net 119 ml       Physical Exam Performed:    BP (!) 166/44   Pulse 60   Temp 98 °F (36.7 °C)   Resp 16   Ht 5' 10\" (1.778 m)   Wt 168 lb 6.9 oz (76.4 kg)   SpO2 99%   BMI 24.17 kg/m²     General appearance: No apparent distress, appears stated age and cooperative. HEENT: Pupils equal, round, and reactive to light. Conjunctivae/corneas clear. Neck: Supple, with full range of motion. R port-a-cath  Respiratory:  Normal respiratory effort. Clear to auscultation, bilaterally without Rales/Wheezes/Rhonchi. Cardiovascular: Regular rate and rhythm with normal S1/S2 without murmurs, rubs or gallops. Abdomen: Soft, non-tender, non-distended with normal bowel sounds. Musculoskeletal: No clubbing, cyanosis or edema bilaterally. Left UE AVF  Skin: Skin color, texture, turgor normal.  No rashes or lesions.   Neurologic: 4/5 strength in RLE,pt has pain on flexion  Psychiatric: Alert and oriented, thought content appropriate, normal insight      Labs:   Recent Labs     05/04/22  0710 05/05/22  0630 05/06/22  0745   WBC 4.4* 4.3* 5.5   HGB 8.9* 9.5* 9.3*   HCT 30.3* 31.9* 31.3*    224 213     Recent Labs     05/04/22  0710 05/05/22  0630 05/06/22  0745    137 133   K 4.0 3.5 4.0   CL 98 96* 97*   CO2 28 30* 25   BUN 19 10 17   CREATININE 4.4* 3.0* 3.8*   CALCIUM 8.5* 8.4* 8.5*   PHOS 3.2  --   --      Recent Labs     05/04/22  0710 05/05/22  0630 05/06/22  0745   AST 86* 55* 37*   ALT 12 11 11   BILITOT 0.3 0.4 0.3   ALKPHOS 124* 126* 127*     No results for input(s): INR in the last 72 hours. No results for input(s): Leena Beat in the last 72 hours. Urinalysis:      Lab Results   Component Value Date    NITRU Negative 11/07/2017    WBCUA 1-3 11/07/2017    WBCUA NONE 08/27/2011    BACTERIA MODERATE 11/07/2017    RBCUA 0-1 11/07/2017    RBCUA 1-3 02/13/2013    BLOODU Negative 11/07/2017    SPECGRAV 1.025 11/07/2017    GLUCOSEU Negative 11/07/2017    GLUCOSEU NEGATIVE 08/27/2011       Radiology:  CT LUMBAR SPINE WO CONTRAST    (Results Pending)           Assessment/Plan:    Active Hospital Problems    Diagnosis     Moderate protein-calorie malnutrition ([facility] [E44.0]      Priority: Medium    NSTEMI (non-ST elevated myocardial infarction) ([facility] [I21.4]      Priority: Medium       NSTEMI  -Cardiology consulted. Plan for cardiac cath today,await report. .  -Pt is still on heparin drip. Await Cardiology recs. -Continue aspirin,brillinta,statin and metoprolol    Osteomyelitis of lumbar spine L1-L2   Hx lumbar fusion  -Neurosurgery consultation. Recommend that patient benefit from a lumbar decompression and fusion once medically stable. -Pt has now RLE pain and weakness. Check CT lumbar. I will discuss with Neurosurgery.  -Pain control.    -Continue vancomycin with HD. Family wanted update from ID. I reviewed plan for 6 weeks of vanco and plan to follow up with Dr Farhana Patterson. CAD  -As above    End-stage renal disease on hemodialysis- Monday Wednesday Friday,   [facility] nephrology. Continue dialysis.     Chronic anemia secondary to end-stage renal disease  -JAYSHREE with HD    Essential hypertension  -BP controlled    Diabetes type 2 with end-stage renal disease:   -Continue insulin coverage    Gout without acute attack:   -Continue allopurinol     Other medical problems:  Bladder carcinoma s/p chemotherapy  History of amputation of left hand distal middle finger for osteomyelitis  [employer]       DVT Prophylaxis: Heparin   Diet: ADULT DIET; Regular; Low Fat/Low Chol/High Fiber/2 gm Na;  Low Sodium (2 gm); 1200 ml  Code Status: Full Code    PT/OT Eval Status: As needed    Dispo -Home pending workup for RLE pain    Giacomo Walker MD

## 2022-05-07 LAB
ALBUMIN SERPL-MCNC: 3 G/DL (ref 3.5–5.2)
ALP BLD-CCNC: 130 U/L (ref 35–104)
ALT SERPL-CCNC: 11 U/L (ref 0–32)
ANION GAP SERPL CALCULATED.3IONS-SCNC: 10 MMOL/L (ref 7–16)
AST SERPL-CCNC: 33 U/L (ref 0–31)
BILIRUB SERPL-MCNC: 0.4 MG/DL (ref 0–1.2)
BUN BLDV-MCNC: 9 MG/DL (ref 6–23)
CALCIUM SERPL-MCNC: 8.7 MG/DL (ref 8.6–10.2)
CHLORIDE BLD-SCNC: 96 MMOL/L (ref 98–107)
CO2: 29 MMOL/L (ref 22–29)
CREAT SERPL-MCNC: 2.7 MG/DL (ref 0.5–1)
GFR AFRICAN AMERICAN: 21
GFR NON-AFRICAN AMERICAN: 21 ML/MIN/1.73
GLUCOSE BLD-MCNC: 55 MG/DL (ref 74–99)
HCT VFR BLD CALC: 29.7 % (ref 34–48)
HEMOGLOBIN: 8.9 G/DL (ref 11.5–15.5)
MCH RBC QN AUTO: 27.3 PG (ref 26–35)
MCHC RBC AUTO-ENTMCNC: 30 % (ref 32–34.5)
MCV RBC AUTO: 91.1 FL (ref 80–99.9)
METER GLUCOSE: 153 MG/DL (ref 74–99)
METER GLUCOSE: 56 MG/DL (ref 74–99)
PDW BLD-RTO: 18 FL (ref 11.5–15)
PLATELET # BLD: 203 E9/L (ref 130–450)
PMV BLD AUTO: 10.8 FL (ref 7–12)
POTASSIUM SERPL-SCNC: 4 MMOL/L (ref 3.5–5)
RBC # BLD: 3.26 E12/L (ref 3.5–5.5)
SODIUM BLD-SCNC: 135 MMOL/L (ref 132–146)
TOTAL PROTEIN: 5.8 G/DL (ref 6.4–8.3)
WBC # BLD: 4.8 E9/L (ref 4.5–11.5)

## 2022-05-07 PROCEDURE — 6370000000 HC RX 637 (ALT 250 FOR IP): Performed by: INTERNAL MEDICINE

## 2022-05-07 PROCEDURE — 80053 COMPREHEN METABOLIC PANEL: CPT

## 2022-05-07 PROCEDURE — 36415 COLL VENOUS BLD VENIPUNCTURE: CPT

## 2022-05-07 PROCEDURE — 82962 GLUCOSE BLOOD TEST: CPT

## 2022-05-07 PROCEDURE — 6360000002 HC RX W HCPCS: Performed by: INTERNAL MEDICINE

## 2022-05-07 PROCEDURE — 2140000000 HC CCU INTERMEDIATE R&B

## 2022-05-07 PROCEDURE — S5553 INSULIN LONG ACTING 5 U: HCPCS | Performed by: INTERNAL MEDICINE

## 2022-05-07 PROCEDURE — 85027 COMPLETE CBC AUTOMATED: CPT

## 2022-05-07 PROCEDURE — 2580000003 HC RX 258: Performed by: INTERNAL MEDICINE

## 2022-05-07 RX ADMIN — LANTHANUM CARBONATE 1000 MG: 500 TABLET, CHEWABLE ORAL at 08:21

## 2022-05-07 RX ADMIN — BRIMONIDINE TARTRATE 1 DROP: 2 SOLUTION OPHTHALMIC at 21:18

## 2022-05-07 RX ADMIN — OXYCODONE AND ACETAMINOPHEN 1 TABLET: 5; 325 TABLET ORAL at 21:10

## 2022-05-07 RX ADMIN — TIMOLOL MALEATE 1 DROP: 5 SOLUTION OPHTHALMIC at 21:18

## 2022-05-07 RX ADMIN — BRIMONIDINE TARTRATE 1 DROP: 2 SOLUTION OPHTHALMIC at 08:21

## 2022-05-07 RX ADMIN — INSULIN GLARGINE-YFGN 5 UNITS: 100 INJECTION, SOLUTION SUBCUTANEOUS at 22:40

## 2022-05-07 RX ADMIN — NEPHROCAP 1 MG: 1 CAP ORAL at 21:10

## 2022-05-07 RX ADMIN — HEPARIN SODIUM 5000 UNITS: 10000 INJECTION INTRAVENOUS; SUBCUTANEOUS at 21:11

## 2022-05-07 RX ADMIN — SODIUM CHLORIDE, PRESERVATIVE FREE 10 ML: 5 INJECTION INTRAVENOUS at 21:11

## 2022-05-07 RX ADMIN — GABAPENTIN 200 MG: 100 CAPSULE ORAL at 21:11

## 2022-05-07 RX ADMIN — ASPIRIN 81 MG: 81 TABLET, COATED ORAL at 08:20

## 2022-05-07 RX ADMIN — ALLOPURINOL 100 MG: 100 TABLET ORAL at 08:20

## 2022-05-07 RX ADMIN — LATANOPROST 1 DROP: 50 SOLUTION OPHTHALMIC at 21:18

## 2022-05-07 RX ADMIN — TICAGRELOR 90 MG: 90 TABLET ORAL at 08:20

## 2022-05-07 RX ADMIN — OXYCODONE AND ACETAMINOPHEN 1 TABLET: 5; 325 TABLET ORAL at 14:56

## 2022-05-07 RX ADMIN — METOPROLOL SUCCINATE 25 MG: 25 TABLET, EXTENDED RELEASE ORAL at 08:20

## 2022-05-07 RX ADMIN — PANTOPRAZOLE SODIUM 40 MG: 40 TABLET, DELAYED RELEASE ORAL at 05:57

## 2022-05-07 RX ADMIN — ACETAMINOPHEN 650 MG: 325 TABLET ORAL at 14:04

## 2022-05-07 RX ADMIN — TIMOLOL MALEATE 1 DROP: 5 SOLUTION OPHTHALMIC at 08:21

## 2022-05-07 RX ADMIN — GABAPENTIN 200 MG: 100 CAPSULE ORAL at 08:20

## 2022-05-07 RX ADMIN — ATORVASTATIN CALCIUM 80 MG: 80 TABLET, FILM COATED ORAL at 21:11

## 2022-05-07 RX ADMIN — SODIUM CHLORIDE, PRESERVATIVE FREE 10 ML: 5 INJECTION INTRAVENOUS at 08:22

## 2022-05-07 RX ADMIN — TICAGRELOR 90 MG: 90 TABLET ORAL at 21:10

## 2022-05-07 ASSESSMENT — PAIN DESCRIPTION - DESCRIPTORS
DESCRIPTORS: ACHING

## 2022-05-07 ASSESSMENT — PAIN SCALES - WONG BAKER
WONGBAKER_NUMERICALRESPONSE: 0
WONGBAKER_NUMERICALRESPONSE: 0

## 2022-05-07 ASSESSMENT — PAIN DESCRIPTION - ORIENTATION
ORIENTATION: RIGHT

## 2022-05-07 ASSESSMENT — PAIN SCALES - GENERAL
PAINLEVEL_OUTOF10: 3
PAINLEVEL_OUTOF10: 5
PAINLEVEL_OUTOF10: 0

## 2022-05-07 ASSESSMENT — PAIN DESCRIPTION - LOCATION
LOCATION: HIP
LOCATION: BACK;HIP
LOCATION: BACK;HIP

## 2022-05-07 ASSESSMENT — PAIN DESCRIPTION - PAIN TYPE
TYPE: CHRONIC PAIN
TYPE: CHRONIC PAIN

## 2022-05-07 ASSESSMENT — PAIN - FUNCTIONAL ASSESSMENT: PAIN_FUNCTIONAL_ASSESSMENT: PREVENTS OR INTERFERES SOME ACTIVE ACTIVITIES AND ADLS

## 2022-05-07 ASSESSMENT — PAIN DESCRIPTION - FREQUENCY: FREQUENCY: CONTINUOUS

## 2022-05-07 NOTE — PROGRESS NOTES
Pharmacy Consultation Note  (Antibiotic Dosing and Monitoring)    Initial consult date: 5/3/22  Consulting physician/provider: Dr. Eryn Samaniego  Drug: Vancomycin  Indication: Osteomyelitis     Age/  Gender Height Weight IBW  Allergy Information   67 y.o./female 5' 10\" (177.8 cm)  5' 10\" (177.8 cm) 163 lb (73.9 kg)     Ideal body weight: 68.5 kg (151 lb 0.2 oz)  Adjusted ideal body weight: 71.7 kg (157 lb 15.7 oz)   Furosemide      Renal Function:  Recent Labs     05/05/22  0630 05/06/22  0745 05/07/22  0600   BUN 10 17 9   CREATININE 3.0* 3.8* 2.7*       Intake/Output Summary (Last 24 hours) at 5/7/2022 0819  Last data filed at 5/7/2022 0500  Gross per 24 hour   Intake 550 ml   Output 0 ml   Net 550 ml       Vancomycin Monitoring:  Trough:  No results for input(s): VANCOTROUGH in the last 72 hours. Random:    Recent Labs     05/05/22  0630 05/06/22  0745   VANCORANDOM 19.7 17.4       Vancomycin Administration Times:    Date  SCr/CrCl       or HD Drug/Dose Time   Given Level(s)   (Time)   5/3 No HD -- -- 22.9   (0400)   5/4 HD x4 hrs Vancomycin 1000 mg IV x1 <1700> 18.8   (0710)   5/5 No HD -- -- 19.7   (0630)   5/6 HD x 4 hrs Vancomycin 1250 IV x 1 14:31 17.4  (0745)   5/7 No HD -- -- --            Assessment:  · Patient is a 78 y.o. female who has been continued on vancomycin for Hx osteomyelitis from last admission  · Receiving vancomycin 750 mg every MWF with dialysis x 6 weeks (End date 6/1/2022) per ID  Estimated Creatinine Clearance: 22 mL/min (A) (based on SCr of 2.7 mg/dL (H)). · To dose vancomycin, pharmacy will be utilizing dosing based off of levels due to patient requiring hemodialysis   · Unclear if dose of vancomycin was given yesterday afternoon (5/4). Patient told RN that she received a dose in dialysis, but no dose was charted. · 5/5: AM level 19.7- it appears that patient did receive dose of vanco, but it was not charted in dialysis  · 5/6: pre-HD level 17.4 mcg/mL.  HD x 4 hours today  · 5/7: no HD scheduled for today      Plan:  · No HD, no vancomycin today  · Will check pre-HD vancomycin level before next HD session (5/8/22)  · Will continue to monitor renal function   · Clinical pharmacy to follow    Thomas LoomisD   Pharmacy Resident   Phone: [phone]  5/7/2022 8:20 AM

## 2022-05-07 NOTE — PLAN OF CARE

## 2022-05-07 NOTE — CONSULTS
Met with patient and discussed that their physician has ordered a referral to our outpatient Phase II Cardiac Rehabilitation program. Reviewed the benefits of cardiac rehabilitation based on their diagnosis and personal risk factors. Patient demonstrates moderate interest in Cardiac Rehabilitation at this time. Cardiac Rehabilitation brochure provided to patient/family. The Cardiac Rehabilitation Program has been provided the patient's referral information and pertinent patient details and history. The patient may call [facility] Artie Kennedy at [phone] for additional information or questions. Contact information for [facility] [place] and other choices close to the patient's residence have been provided in the discharge instructions so that the patient may call and schedule an appointment when cleared by their physician.  Thank you for the referral.

## 2022-05-07 NOTE — PROGRESS NOTES
Progress Note  5/7/2022 9:10 AM  Subjective:   Admit Date: 5/2/2022  PCP: Laura Sutherland MD  Interval History: patient examined doing well feels ok     Diet: ADULT DIET; Regular; Low Fat/Low Chol/High Fiber/2 gm Na; Low Sodium (2 gm); 1200 ml    Data:   Scheduled Meds:   heparin (porcine)  5,000 Units SubCUTAneous BID    sodium chloride flush  5-40 mL IntraVENous 2 times per day    allopurinol  100 mg Oral Daily    atorvastatin  80 mg Oral Nightly    b complex-C-folic acid  1 mg Oral Nightly    bumetanide  2 mg Oral Once per day on Sun Tue Thu    gabapentin  200 mg Oral TID    insulin glargine-yfgn  5 Units SubCUTAneous Nightly    lanthanum  1,000 mg Oral TID WC    latanoprost  1 drop Right Eye Nightly    metoprolol succinate  25 mg Oral Daily    pantoprazole  40 mg Oral QAM AC    brimonidine  1 drop Right Eye BID    And    timolol  1 drop Right Eye BID    ticagrelor  90 mg Oral BID    vancomycin (VANCOCIN) intermittent dosing (placeholder)   Other RX Placeholder    aspirin  81 mg Oral Daily    epoetin fani-epbx  2,000 Units SubCUTAneous Q MWF    alteplase  2 mg IntraCATHeter Once     Continuous Infusions:   sodium chloride      sodium chloride      dextrose       PRN Meds:sodium chloride flush, sodium chloride, acetaminophen, midodrine, sodium chloride, ondansetron **OR** ondansetron, polyethylene glycol, [DISCONTINUED] acetaminophen **OR** acetaminophen, lidocaine-prilocaine, oxyCODONE-acetaminophen, fentanNYL, glucose, dextrose, glucagon (rDNA), dextrose, heparin flush  I/O last 3 completed shifts: In: 550 [P.O.:540; I.V.:10]  Out: 1 [Stool:1]  No intake/output data recorded.     Intake/Output Summary (Last 24 hours) at 5/7/2022 0910  Last data filed at 5/7/2022 0500  Gross per 24 hour   Intake 430 ml   Output 0 ml   Net 430 ml     CBC:   Recent Labs     05/05/22  0630 05/06/22  0745 05/07/22  0600   WBC 4.3* 5.5 4.8   HGB 9.5* 9.3* 8.9*    213 203     BMP:    Recent Labs 05/05/22  0630 05/06/22  0745 05/07/22  0600    133 135   K 3.5 4.0 4.0   CL 96* 97* 96*   CO2 30* 25 29   BUN 10 17 9   CREATININE 3.0* 3.8* 2.7*   GLUCOSE 72* 104* 55*     Hepatic:   Recent Labs     05/05/22  0630 05/06/22  0745 05/07/22  0600   AST 55* 37* 33*   ALT 11 11 11   BILITOT 0.4 0.3 0.4   ALKPHOS 126* 127* 130*     Troponin: No results for input(s): TROPONINI in the last 72 hours. BNP: No results for input(s): BNP in the last 72 hours. Lipids: No results for input(s): CHOL, HDL in the last 72 hours. Invalid input(s): LDLCALCU  ABGs: No results found for: PHART, PO2ART, [id number]  INR: No results for input(s): INR in the last 72 hours. -----------------------------------------------------------------  RAD: CT ABDOMEN PELVIS WO CONTRAST Additional Contrast? None    Result Date: 5/2/2022  EXAMINATION: CT OF THE ABDOMEN AND PELVIS WITHOUT CONTRAST 5/2/2022 5:15 pm TECHNIQUE: CT of the abdomen and pelvis was performed without the administration of intravenous contrast. Multiplanar reformatted images are provided for review. Dose modulation, iterative reconstruction, and/or weight based adjustment of the mA/kV was utilized to reduce the radiation dose to as low as reasonably achievable. COMPARISON: CT abdomen and pelvis, 11/07/2017. HISTORY: ORDERING SYSTEM PROVIDED HISTORY: abd pain TECHNOLOGIST PROVIDED HISTORY: Reason for exam:->abd pain Additional Contrast?->None Decision Support Exception - unselect if not a suspected or confirmed emergency medical condition->Emergency Medical Condition (MA) FINDINGS: Lower Chest: The heart is mildly enlarged. No pleural or pericardial effusion. Mild atelectasis in the dependent aspect of the left lower lobe. Organs: Liver: Unremarkable. Gallbladder: Surgically absent Pancreas: Unremarkable. Spleen:  Unremarkable. Adrenals: Stable nodularity in the adrenal glands, which the is likely due to the presence of adenomas.  Kidneys: The kidneys are bilaterally atrophic with multiple cysts. No hydronephrosis. GI/Bowel: Moderate fecal retention is seen in the colon. No evidence of bowel wall thickening or obstruction. Mild-to-moderate distal colonic diverticulosis without diverticulitis. Normal appendix. Pelvis: The urinary bladder is unremarkable. Within limits of the CT technique, the uterus and the adnexa are grossly unremarkable. Peritoneum/Retroperitoneum: There is prominent calcified atherosclerosis in the medium and small-caliber arteries, characteristic of Monckeberg medial calcific sclerosis, which is often seen in the setting of diabetes and/or chronic renal disease. Prominent calcified atherosclerosis also seen in the abdominal aorta. No evidence of aneurysm. No lymphadenopathy. No free air or free fluid is seen. Bones/Soft Tissues: Bones are diffusely sclerotic likely due to renal osteodystrophy. Status post decompressive laminectomies with posterior interbody fusion from L3-L5. Severe destructive endplate changes at I4-3 are grossly stable. There is progressive loss of height in the L2 vertebral body. There is stable grade 1-2 anterolisthesis of L2 over L3. There is interval development of erosive changes along the inferior endplate of the L1 vertebral body. Spinal stimulator in situ, terminating in the posterior epidural space at T8-9.     1. Erosive changes along the inferior endplate of the L1 vertebral body, which has developed in the interim since the previous CT from 11/07/2017. It may be degenerative, related to spondyloarthropathy of dialysis (related to accumulation of amyloid) or discitis/osteomyelitis. Consider further characterization with MRI. 2. Chronic destructive changes along the endplates at T7-1 with progressive loss of height in the L2 vertebral body. 3. Severe atherosclerosis, within appearance suggestive of Monckeberg medial calcific sclerosis, as may be seen with diabetes and end-stage renal disease.  4. Moderate fecal retention in the colon. No bowel wall thickening or evidence of obstruction. 5. Stable adrenal nodules, likely adenomas. RECOMMENDATIONS: Unavailable     CT LUMBAR SPINE WO CONTRAST    Result Date: 5/2/2022  EXAMINATION: CT OF THE LUMBAR SPINE WITHOUT CONTRAST  5/2/2022 TECHNIQUE: CT of the lumbar spine was performed without the administration of intravenous contrast. Multiplanar reformatted images are provided for review. Adjustment of mA and/or kV according to patient size was utilized. Dose modulation, iterative reconstruction, and/or weight based adjustment of the mA/kV was utilized to reduce the radiation dose to as low as reasonably achievable. COMPARISON: CT lumbar spine 04/13/2022 HISTORY: ORDERING SYSTEM PROVIDED HISTORY: back pain TECHNOLOGIST PROVIDED HISTORY: Reason for exam:->back pain Decision Support Exception - unselect if not a suspected or confirmed emergency medical condition->Emergency Medical Condition (MA) FINDINGS: BONES/ALIGNMENT: Again seen are changes of posterior transpedicular fusion L3-L5. Again seen are severe erosive changes at the L1-2 and L2-3 endplates with height loss of these endplates again noted. There is stable associated grade 1 anterolisthesis of L2 on L3. Schmorl's nodes are noted at multiple levels. DEGENERATIVE CHANGES: Severe spinal stenosis again seen at L2-3 secondary to the spondylolisthesis and likely inflammatory change. Calcified disc bulge and inflammatory change at L1-2 result in likely moderate spinal stenosis. No high-grade spinal stenosis is seen at the fused levels, allowing for limitations from hardware related artifact. High-grade neural foraminal stenosis is suspected at L1-2 and L2-3 secondary to the inflammatory change. SOFT TISSUES/RETROPERITONEUM: Edematous bilateral psoas muscles. Paravertebral edema is suspected at the L1-2 and L2-3 levels. Bilateral renal cysts. Atrophic kidneys. Extensive atherosclerotic disease. Colonic diverticulosis.   A generator pack is present in the right paraspinal tissues at the lumbosacral junction, with a catheter ascending in the paraspinal subcutaneous tissues superiorly beyond the field of view. Again seen are severe erosive changes at the L1-2 and L2-3 endplates, highly suggestive discitis-osteomyelitis. Associated spondylolisthesis and inflammatory change again results in severe spinal stenosis at L2-3 and moderate spinal stenosis at L1-2. Further evaluation with contrast enhanced MRI of the lumbar spine is recommended. XR CHEST PORTABLE    Result Date: 5/2/2022  EXAMINATION: ONE XRAY VIEW OF THE CHEST 5/2/2022 4:25 pm COMPARISON: Chest CT 7 December 2021 and chest radiograph 19 May 2021 HISTORY: ORDERING SYSTEM PROVIDED HISTORY: back/ abd pain TECHNOLOGIST PROVIDED HISTORY: Reason for exam:->back/ abd pain FINDINGS: Unchanged implanted central venous catheter on the right and thoracic spinal neurostimulator. Airspace disease has resolved. I suspect that this represented cardiogenic edema. Heart is enlarged. Pulmonary vascularity is normal.  Neither costophrenic angle is blunted. Interval resolution of suspected cardiogenic edema. Cardiomegaly. See above. CTA PULMONARY W CONTRAST    Result Date: 5/2/2022  EXAMINATION: CTA OF THE CHEST 5/2/2022 6:42 pm TECHNIQUE: CTA of the chest was performed after the administration of intravenous contrast.  Multiplanar reformatted images are provided for review. MIP images are provided for review. Dose modulation, iterative reconstruction, and/or weight based adjustment of the mA/kV was utilized to reduce the radiation dose to as low as reasonably achievable. COMPARISON: Unenhanced chest CT study from December 7, 2021. Lumbar spine CT study from May 2, 2022.  HISTORY: ORDERING SYSTEM PROVIDED HISTORY: rule out pe back pain TECHNOLOGIST PROVIDED HISTORY: Reason for exam:->rule out pe back pain Decision Support Exception - unselect if not a suspected or confirmed emergency medical condition->Emergency Medical Condition (MA) FINDINGS: Pulmonary Arteries: Pulmonary arteries are adequately opacified for evaluation. No evidence of intraluminal filling defect to suggest pulmonary embolism. Main pulmonary artery is normal in caliber. Mediastinum: The heart is enlarged. A right jugular vein chest port catheter ends in the distal SVC. Bula Dayhoff The thoracic aorta and proximal great vessels are patent and of normal caliber. No pericardial effusion. Diffuse left breast skin thickening with several chest wall collateral vessels again noted. There are a few mildly prominent supraclavicular and upper mediastinal lymph nodes noted, not significantly changed, nor is a left lower pole thyroid nodule. Lungs/pleura: The trachea and mainstem bronchi are widely patent. Minimal atelectasis is present at the left lung base. The lungs are otherwise clear. No discrete pulmonary nodule or mass. No pleural effusion or pneumothorax. Soft Tissues/Bones: Degenerative changes are present throughout the thoracic spine. Incompletely healed fracture of the upper sternal body, unchanged. There is disc and endplate destruction of the upper lumbar spine which is seen to better extent on the lumbar spine CT study. Upper Abdomen: The native kidneys are again seen to be somewhat atrophic. Extensive arterial vascular calcifications are noted. Diffuse bilateral adrenal gland enlargement, unchanged. The gallbladder is surgically absent     No evidence of pulmonary embolism or acute thoracic aortic abnormality. Cardiomegaly. Minimal left basilar atelectasis. Otherwise, clear lungs. Destructive process of the upper lumbar spine at the L1-L2 level, only partially imaged. This is seen to better extent on the lumbar spine MRI of the same day.        Objective:   Vitals: BP (!) 117/41   Pulse 62   Temp 98.2 °F (36.8 °C)   Resp 20   Ht 5' 10\" (1.778 m)   Wt 168 lb 6.9 oz (76.4 kg)   SpO2 100%   BMI 24.17 kg/m² General appearance: appears stated age   Skin:  No rashes or lesions  HEENT: Head: Normocephalic, no lesions, without obvious abnormality.   Neck: no adenopathy, no carotid bruit, no JVD, supple, symmetrical, trachea midline and thyroid not enlarged, symmetric, no tenderness/mass/nodules  Lungs: clear to auscultation bilaterally  Heart: regular rate and rhythm, S1, S2 normal, no murmur, click, rub or gallop  Abdomen: soft, non-tender; bowel sounds normal; no masses,  no organomegaly  Extremities: extremities normal, atraumatic, no cyanosis or edema  Neurologic: Mental status: Alert, oriented, thought content appropriate    Assessment:   Patient Active Problem List:     Diabetic retinopathy ([address]     Malignant neoplasm of right female breast ([address]     Atherosclerosis of native coronary artery of native heart without angina pectoris     Moderate obesity     Left ventricular hypertrophy     Herniated lumbar intervertebral disc     Lumbar degenerative disc disease     Pseudomeningocele     Lumbar radiculopathy     Lymphedema of arm     CKD (chronic kidney disease) stage 4, GFR 15-29 ml/min ([facility])     Insulin dependent type 2 diabetes mellitus ([facility])     Anemia of chronic disease     Chronic diastolic CHF (congestive heart failure) ([facility])     Neuropathy     Hypertension     Glaucoma     Refusal of blood product     Pancytopenia ([facility])     Controlled type 2 diabetes mellitus with chronic kidney disease on chronic dialysis, with long-term current use of insulin ([facility])     Vitreous hemorrhage ([address]     Patient is Hindu     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 ([facility])     Hyperkalemia, diminished renal excretion     Chronic pain syndrome     Lumbar post-laminectomy syndrome     Myalgia     Cervicalgia     Diabetic peripheral neuropathy ([facility])     ESRD (end stage renal disease) ([facility])     Bilateral carpal tunnel syndrome     Spinal stenosis of lumbar region with neurogenic claudication Cardiac arrest ([facility]     ESRD (end stage renal disease) on dialysis ([facility]     Mixed hyperlipidemia     Lymphedema of right upper extremity     Coronary artery disease involving native coronary artery of native heart with angina pectoris (HCC)     Ventricular tachycardia (HCC)     Mitral valve disease     CAD in native artery     Lumbar stenosis without neurogenic claudication     Lumbar foraminal stenosis     Back pain     Intractable low back pain     Unable to ambulate     NSTEMI (non-ST elevated myocardial infarction) ([facility]     Moderate protein-calorie malnutrition ([facility]    Plan:   Assessment and Plans:     1. ESRD on HD  OP [facility] MWF 1st shift   left AV fistula  Continue HD MWF     2. NSTEMI  Troponin 535 on 5/2--> 1388 on 5/3  On heparin drip  S/p cardiac catheterization with balloon angioplasty 5/5  Cardiology following     3. Back pain  Status post bone biopsy 4/18 for lumbar spine osteomyelitis  On vancomycin  Neurosurgery following     4. Hypertension  BP goal<140/90  BP at goal  Monitor on current regimen and with HD     5.   Anemia  Hemoglobin target 10-12  Hemoglobin 9.3 today  Continue JAYSHREE as hemoglobin below target  Transfuse for hemoglobin<7  Monitor H&H     6.  Secondary hyperparathyroidism of renal origin  PTH noted in the outpatient setting on 4/25/2022 was 443  Phosphorus 3.2 on 5/4  Continue on Fosrenol  Monitor labs       Corinne Canton, MD

## 2022-05-07 NOTE — PROGRESS NOTES
Hospitalist Progress Note      PCP: Buck Michelle MD    Date of Admission: 5/2/2022    Hospital Course:   \" 78 y.o. female who past medical history that includes lumbar osteomyelitis, coronary artery disease, ESRD, anemia, hypertension, hyperlipidemia, diabetes, history of bladder carcinoma presented with back pain and abdominal pain for several weeks and is worsening over the past few days. She was recently admitted due to osteomyelitis. She was recently discharged to rehab  on vancomycin. In the ER, there was concern for STEMI on EKG. Interventional cardiology was consulted from the ER and they advised no acute STEMI on EKG however they will plan to do a PCI in the morning. She was placed on heparin drip. [facility] ER also spoke with neurosurgeon, Dr. Howard Monday for continued signs of osteomyelitis. In ER, routine labwork was performed which revealed creatinine 2.5, troponin 323->535. EKG revealed atrial fibrillationST elevation noted in leads aVR ST depressions noted in leads V4 through V6 as well as in leads I to, ST elevation in leads V1. Repeat EKG. The patient received heparin drip in the emergency room and was admitted under the care of [facility] physicians. \"       Subjective:    She complains of RLE pain and weakness.     Medications:  Reviewed    Infusion Medications    sodium chloride      sodium chloride      dextrose       Scheduled Medications    heparin (porcine)  5,000 Units SubCUTAneous BID    sodium chloride flush  5-40 mL IntraVENous 2 times per day    allopurinol  100 mg Oral Daily    atorvastatin  80 mg Oral Nightly    b complex-C-folic acid  1 mg Oral Nightly    bumetanide  2 mg Oral Once per day on Sun Tue Thu    gabapentin  200 mg Oral TID    insulin glargine-yfgn  5 Units SubCUTAneous Nightly    lanthanum  1,000 mg Oral TID WC    latanoprost  1 drop Right Eye Nightly    metoprolol succinate  25 mg Oral Daily    pantoprazole  40 mg Oral QAM AC    brimonidine  1 drop Right Eye BID    And    timolol  1 drop Right Eye BID    ticagrelor  90 mg Oral BID    vancomycin (VANCOCIN) intermittent dosing (placeholder)   Other RX Placeholder    aspirin  81 mg Oral Daily    epoetin fani-epbx  2,000 Units SubCUTAneous Q MWF    alteplase  2 mg IntraCATHeter Once     PRN Meds: sodium chloride flush, sodium chloride, acetaminophen, midodrine, sodium chloride, ondansetron **OR** ondansetron, polyethylene glycol, [DISCONTINUED] acetaminophen **OR** acetaminophen, lidocaine-prilocaine, oxyCODONE-acetaminophen, fentanNYL, glucose, dextrose, glucagon (rDNA), dextrose, heparin flush      Intake/Output Summary (Last 24 hours) at 5/7/2022 1132  Last data filed at 5/7/2022 0500  Gross per 24 hour   Intake 430 ml   Output 0 ml   Net 430 ml       Physical Exam Performed:    BP (!) 117/41   Pulse 62   Temp 98.2 °F (36.8 °C)   Resp 20   Ht 5' 10\" (1.778 m)   Wt 168 lb 6.9 oz (76.4 kg)   SpO2 100%   BMI 24.17 kg/m²     General appearance: No apparent distress, appears stated age and cooperative. HEENT: Pupils equal, round, and reactive to light. Conjunctivae/corneas clear. Neck: Supple, with full range of motion. R port-a-cath  Respiratory:  Normal respiratory effort. Clear to auscultation, bilaterally without Rales/Wheezes/Rhonchi. Cardiovascular: Regular rate and rhythm with normal S1/S2 without murmurs, rubs or gallops. Abdomen: Soft, non-tender, non-distended with normal bowel sounds. Musculoskeletal: No clubbing, cyanosis or edema bilaterally. Left UE AVF  Skin: Skin color, texture, turgor normal.  No rashes or lesions.   Neurologic: 4/5 strength in RLE,pt has pain on flexion  Psychiatric: Alert and oriented, thought content appropriate, normal insight      Labs:   Recent Labs     05/05/22  0630 05/06/22  0745 05/07/22  0600   WBC 4.3* 5.5 4.8   HGB 9.5* 9.3* 8.9*   HCT 31.9* 31.3* 29.7*    213 203     Recent Labs 05/05/22  0630 05/06/22  0745 05/07/22  0600    133 135   K 3.5 4.0 4.0   CL 96* 97* 96*   CO2 30* 25 29   BUN 10 17 9   CREATININE 3.0* 3.8* 2.7*   CALCIUM 8.4* 8.5* 8.7     Recent Labs     05/05/22  0630 05/06/22  0745 05/07/22  0600   AST 55* 37* 33*   ALT 11 11 11   BILITOT 0.4 0.3 0.4   ALKPHOS 126* 127* 130*     No results for input(s): INR in the last 72 hours. No results for input(s): Les Dino in the last 72 hours. Urinalysis:      Lab Results   Component Value Date    NITRU Negative 11/07/2017    WBCUA 1-3 11/07/2017    WBCUA NONE 08/27/2011    BACTERIA MODERATE 11/07/2017    RBCUA 0-1 11/07/2017    RBCUA 1-3 02/13/2013    BLOODU Negative 11/07/2017    SPECGRAV 1.025 11/07/2017    GLUCOSEU Negative 11/07/2017    GLUCOSEU NEGATIVE 08/27/2011       Radiology:  CT LUMBAR SPINE WO CONTRAST   Final Result   No significant interval change. Again seen are endplate erosions at S0-9, highly suggestive of osteomyelitis. Erosions at L2-3 are more chronic appearing, but are also unchanged from   prior. Again seen are changes of posterior spinal fusion from L3-5. Grossly stable severe spinal stenosis at L2-3 and moderate spinal stenosis at   L1-2. High-grade neural foraminal stenoses at these levels are grossly   stable. Note that CT is inferior to MRI for the evaluation of spinal/neural   foraminal stenosis. MRI could also better evaluate the extent of   discitis-osteomyelitis. Assessment/Plan:    Active Hospital Problems    Diagnosis     Moderate protein-calorie malnutrition ([address] [E44.0]      Priority: Medium    NSTEMI (non-ST elevated myocardial infarction) ([address] [I21.4]      Priority: Medium       NSTEMI  -Cardiology consulted. Plan for cardiac cath today,await report. .  -Pt is still on heparin drip. Await Cardiology recs.   -Continue aspirin,brillinta,statin and metoprolol    Osteomyelitis of lumbar spine L1-L2   Hx lumbar fusion  -Neurosurgery consultation. Recommend that patient benefit from a lumbar decompression and fusion once medically stable. -Pt has now RLE pain and weakness. CT lumbar shows no change in previous findings of osteomyelitis. Dr Aylin Syed, to evaluate.  -Pain control.    -Continue vancomycin with HD. Family wanted update from ID. I reviewed plan for 6 weeks of vanco and plan to follow up with Dr Lily Goodwin. CAD  -As above    End-stage renal disease on hemodialysis- Monday Wednesday Friday,   [facility] nephrology. Continue dialysis. Chronic anemia secondary to end-stage renal disease  -JAYSHREE with HD    Essential hypertension  -BP controlled    Diabetes type 2 with end-stage renal disease:   -Continue insulin coverage    Gout without acute attack:   -Continue allopurinol     Other medical problems:  Bladder carcinoma s/p chemotherapy  History of amputation of left hand distal middle finger for osteomyelitis  [employer]       DVT Prophylaxis: Heparin   Diet: ADULT DIET; Regular; Low Fat/Low Chol/High Fiber/2 gm Na;  Low Sodium (2 gm); 1200 ml  Code Status: Full Code    PT/OT Eval Status: As needed    Dispo -Return to [facility] After workup for RLE pain    Milind Cantu MD

## 2022-05-08 LAB
ALBUMIN SERPL-MCNC: 2.8 G/DL (ref 3.5–5.2)
ALP BLD-CCNC: 125 U/L (ref 35–104)
ALT SERPL-CCNC: 11 U/L (ref 0–32)
ANION GAP SERPL CALCULATED.3IONS-SCNC: 10 MMOL/L (ref 7–16)
AST SERPL-CCNC: 27 U/L (ref 0–31)
BILIRUB SERPL-MCNC: 0.3 MG/DL (ref 0–1.2)
BUN BLDV-MCNC: 17 MG/DL (ref 6–23)
CALCIUM SERPL-MCNC: 8.5 MG/DL (ref 8.6–10.2)
CHLORIDE BLD-SCNC: 97 MMOL/L (ref 98–107)
CO2: 28 MMOL/L (ref 22–29)
CREAT SERPL-MCNC: 3.7 MG/DL (ref 0.5–1)
GFR AFRICAN AMERICAN: 15
GFR NON-AFRICAN AMERICAN: 15 ML/MIN/1.73
GLUCOSE BLD-MCNC: 77 MG/DL (ref 74–99)
HCT VFR BLD CALC: 30 % (ref 34–48)
HEMOGLOBIN: 9 G/DL (ref 11.5–15.5)
MCH RBC QN AUTO: 27.4 PG (ref 26–35)
MCHC RBC AUTO-ENTMCNC: 30 % (ref 32–34.5)
MCV RBC AUTO: 91.5 FL (ref 80–99.9)
METER GLUCOSE: 149 MG/DL (ref 74–99)
METER GLUCOSE: 80 MG/DL (ref 74–99)
PDW BLD-RTO: 17.9 FL (ref 11.5–15)
PLATELET # BLD: 184 E9/L (ref 130–450)
PMV BLD AUTO: 10.5 FL (ref 7–12)
POTASSIUM SERPL-SCNC: 4.1 MMOL/L (ref 3.5–5)
RBC # BLD: 3.28 E12/L (ref 3.5–5.5)
SODIUM BLD-SCNC: 135 MMOL/L (ref 132–146)
TOTAL PROTEIN: 5.5 G/DL (ref 6.4–8.3)
VANCOMYCIN RANDOM: 23.5 MCG/ML (ref 5–40)
WBC # BLD: 4.2 E9/L (ref 4.5–11.5)

## 2022-05-08 PROCEDURE — 85027 COMPLETE CBC AUTOMATED: CPT

## 2022-05-08 PROCEDURE — 80202 ASSAY OF VANCOMYCIN: CPT

## 2022-05-08 PROCEDURE — 2140000000 HC CCU INTERMEDIATE R&B

## 2022-05-08 PROCEDURE — 80053 COMPREHEN METABOLIC PANEL: CPT

## 2022-05-08 PROCEDURE — 6370000000 HC RX 637 (ALT 250 FOR IP): Performed by: INTERNAL MEDICINE

## 2022-05-08 PROCEDURE — 82962 GLUCOSE BLOOD TEST: CPT

## 2022-05-08 PROCEDURE — S5553 INSULIN LONG ACTING 5 U: HCPCS | Performed by: INTERNAL MEDICINE

## 2022-05-08 PROCEDURE — 36415 COLL VENOUS BLD VENIPUNCTURE: CPT

## 2022-05-08 PROCEDURE — 6360000002 HC RX W HCPCS: Performed by: INTERNAL MEDICINE

## 2022-05-08 PROCEDURE — 2580000003 HC RX 258: Performed by: INTERNAL MEDICINE

## 2022-05-08 RX ORDER — OXYCODONE HYDROCHLORIDE AND ACETAMINOPHEN 5; 325 MG/1; MG/1
1 TABLET ORAL EVERY 6 HOURS PRN
Qty: 12 TABLET | Refills: 0 | Status: SHIPPED | OUTPATIENT
Start: 2022-05-08 | End: 2022-05-11

## 2022-05-08 RX ORDER — ASPIRIN 81 MG/1
81 TABLET ORAL DAILY
Qty: 30 TABLET | Refills: 0
Start: 2022-05-09 | End: 2022-07-11

## 2022-05-08 RX ADMIN — OXYCODONE AND ACETAMINOPHEN 1 TABLET: 5; 325 TABLET ORAL at 15:18

## 2022-05-08 RX ADMIN — HEPARIN SODIUM 5000 UNITS: 10000 INJECTION INTRAVENOUS; SUBCUTANEOUS at 08:39

## 2022-05-08 RX ADMIN — ASPIRIN 81 MG: 81 TABLET, COATED ORAL at 08:39

## 2022-05-08 RX ADMIN — BRIMONIDINE TARTRATE 1 DROP: 2 SOLUTION OPHTHALMIC at 08:39

## 2022-05-08 RX ADMIN — BUMETANIDE 2 MG: 1 TABLET ORAL at 08:39

## 2022-05-08 RX ADMIN — BRIMONIDINE TARTRATE 1 DROP: 2 SOLUTION OPHTHALMIC at 22:29

## 2022-05-08 RX ADMIN — GABAPENTIN 200 MG: 100 CAPSULE ORAL at 08:39

## 2022-05-08 RX ADMIN — GABAPENTIN 200 MG: 100 CAPSULE ORAL at 15:18

## 2022-05-08 RX ADMIN — GABAPENTIN 200 MG: 100 CAPSULE ORAL at 22:30

## 2022-05-08 RX ADMIN — TICAGRELOR 90 MG: 90 TABLET ORAL at 08:40

## 2022-05-08 RX ADMIN — LATANOPROST 1 DROP: 50 SOLUTION OPHTHALMIC at 22:30

## 2022-05-08 RX ADMIN — LANTHANUM CARBONATE 1000 MG: 500 TABLET, CHEWABLE ORAL at 08:38

## 2022-05-08 RX ADMIN — SODIUM CHLORIDE, PRESERVATIVE FREE 10 ML: 5 INJECTION INTRAVENOUS at 08:39

## 2022-05-08 RX ADMIN — NEPHROCAP 1 MG: 1 CAP ORAL at 22:30

## 2022-05-08 RX ADMIN — HEPARIN SODIUM 5000 UNITS: 10000 INJECTION INTRAVENOUS; SUBCUTANEOUS at 22:31

## 2022-05-08 RX ADMIN — PANTOPRAZOLE SODIUM 40 MG: 40 TABLET, DELAYED RELEASE ORAL at 06:03

## 2022-05-08 RX ADMIN — ATORVASTATIN CALCIUM 80 MG: 80 TABLET, FILM COATED ORAL at 22:30

## 2022-05-08 RX ADMIN — TIMOLOL MALEATE 1 DROP: 5 SOLUTION OPHTHALMIC at 22:30

## 2022-05-08 RX ADMIN — LANTHANUM CARBONATE 1000 MG: 500 TABLET, CHEWABLE ORAL at 17:50

## 2022-05-08 RX ADMIN — ALLOPURINOL 100 MG: 100 TABLET ORAL at 08:39

## 2022-05-08 RX ADMIN — TIMOLOL MALEATE 1 DROP: 5 SOLUTION OPHTHALMIC at 08:40

## 2022-05-08 RX ADMIN — OXYCODONE AND ACETAMINOPHEN 1 TABLET: 5; 325 TABLET ORAL at 22:41

## 2022-05-08 RX ADMIN — SODIUM CHLORIDE, PRESERVATIVE FREE 10 ML: 5 INJECTION INTRAVENOUS at 22:30

## 2022-05-08 RX ADMIN — INSULIN GLARGINE-YFGN 5 UNITS: 100 INJECTION, SOLUTION SUBCUTANEOUS at 22:31

## 2022-05-08 RX ADMIN — LANTHANUM CARBONATE 1000 MG: 500 TABLET, CHEWABLE ORAL at 12:30

## 2022-05-08 RX ADMIN — TICAGRELOR 90 MG: 90 TABLET ORAL at 22:33

## 2022-05-08 RX ADMIN — METOPROLOL SUCCINATE 25 MG: 25 TABLET, EXTENDED RELEASE ORAL at 08:40

## 2022-05-08 ASSESSMENT — PAIN DESCRIPTION - DESCRIPTORS
DESCRIPTORS: ACHING
DESCRIPTORS: ACHING

## 2022-05-08 ASSESSMENT — PAIN DESCRIPTION - ONSET
ONSET: ON-GOING
ONSET: ON-GOING

## 2022-05-08 ASSESSMENT — PAIN DESCRIPTION - PAIN TYPE
TYPE: ACUTE PAIN
TYPE: CHRONIC PAIN

## 2022-05-08 ASSESSMENT — PAIN DESCRIPTION - FREQUENCY
FREQUENCY: INTERMITTENT
FREQUENCY: CONTINUOUS

## 2022-05-08 ASSESSMENT — PAIN SCALES - GENERAL
PAINLEVEL_OUTOF10: 3
PAINLEVEL_OUTOF10: 8
PAINLEVEL_OUTOF10: 7

## 2022-05-08 ASSESSMENT — PAIN DESCRIPTION - ORIENTATION: ORIENTATION: LOWER

## 2022-05-08 ASSESSMENT — PAIN DESCRIPTION - LOCATION
LOCATION: BACK
LOCATION: BACK

## 2022-05-08 ASSESSMENT — PAIN - FUNCTIONAL ASSESSMENT: PAIN_FUNCTIONAL_ASSESSMENT: PREVENTS OR INTERFERES WITH ALL ACTIVE AND SOME PASSIVE ACTIVITIES

## 2022-05-08 NOTE — DISCHARGE SUMMARY
Hospital Medicine Discharge Summary    Patient ID: Giulia Couch      Patient's PCP: Aline Hidalgo MD    Admit Date: 5/2/2022     Discharge Date:   5/8/22    Admitting Physician: Geoffrey Mcgowan DO     Discharge Physician: Florentin Beckford MD     Discharge Diagnoses: Active Hospital Problems    Diagnosis     Moderate protein-calorie malnutrition ([facility] [address] [E44.0]      Priority: Medium    NSTEMI (non-ST elevated myocardial infarction) ([facility] [address] [I21.4]      Priority: Medium       The patient was seen and examined on day of discharge and this discharge summary is in conjunction with any daily progress note from day of discharge. Hospital Course:   \" 78 y.o. female who past medical history that includes lumbar osteomyelitis, coronary artery disease, ESRD, anemia, hypertension, hyperlipidemia, diabetes, history of bladder carcinoma presented with back pain and abdominal pain for several weeks and is worsening over the past few days. Kelly Lopez was recently admitted due to osteomyelitis.  She was recently discharged to rehab  on vancomycin.  In the ER, there was concern for STEMI on EKG.  Interventional cardiology was consulted from the ER and they advised no acute STEMI on EKG however they will plan to do a PCI in the morning.  She was placed on heparin drip.  He was transferred to see Rubina Arguello for continued work-up.  ER also spoke with neurosurgeon, Dr. Leydi Barrios for continued signs of osteomyelitis. In ER, routine labwork was performed which revealed creatinine 2.5, troponin 323->535. EKG revealed atrial fibrillationST elevation noted in leads aVR ST depressions noted in leads V4 through V6 as well as in leads I to, ST elevation in leads V1. Repeat EKG. The patient received heparin drip in the emergency room and was admitted under the care of sound physicians. \"      NSTEMI  -Cardiology consulted. Pt had cardiac cath on 5/5/22. S/p PCI for RCA in stent restenosis.   -Continue aspirin,brillinta,statin and metoprolol     Osteomyelitis of lumbar spine L1-L2   Hx lumbar fusion  -Neurosurgery consultation. Recommend that patient benefit from a lumbar decompression and fusion once medically stable. -Pt has now RLE pain and weakness. CT lumbar shows no change in previous findings of osteomyelitis. Dr Kirt Meraz, recommends no new changes. Plan remains as above.  -Pain control.    -Continue vancomycin with HD. Family wanted update from ID. I reviewed plan for 6 weeks of vanco and plan to follow up with Dr Tone Deal.     CAD  -As above     End-stage renal disease on hemodialysis- Monday Wednesday Friday,   [facility] nephrology. Continue dialysis.     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 HEENT: Pupils equal, round, and reactive to light. Conjunctivae/corneas clear. Neck: Supple, with full range of motion. R port-a-cath  Respiratory:  Normal respiratory effort. Clear to auscultation, bilaterally without Rales/Wheezes/Rhonchi. Cardiovascular: Regular rate and rhythm with normal S1/S2 without murmurs, rubs or gallops. Abdomen: Soft, non-tender, non-distended with normal bowel sounds. Musculoskeletal: No clubbing, cyanosis or edema bilaterally. Left UE AVF  Skin: Skin color, texture, turgor normal.  No rashes or lesions.   Neurologic: 4/5 strength in RLE,pt has pain on flexion  Psychiatric: Alert and oriented, thought content appropriate, normal insight     Labs: For convenience and continuity at follow-up the following most recent labs are provided:      CBC:    Lab Results   Component Value Date    WBC 4.2 05/08/2022    HGB 9.0 05/08/2022    HCT 30.0 05/08/2022     05/08/2022       Renal:    Lab Results   Component Value Date     05/08/2022    K 4.1 05/08/2022    K 3.6 05/02/2022    CL 97 05/08/2022    CO2 28 05/08/2022    BUN 17 05/08/2022    CREATININE 3.7 05/08/2022    CALCIUM 8.5 05/08/2022    PHOS 3.2 05/04/2022         Significant Diagnostic Studies    Radiology:   CT LUMBAR SPINE WO CONTRAST   Final Result   No significant interval change. Again seen are endplate erosions at W9-8, highly suggestive of osteomyelitis. Erosions at L2-3 are more chronic appearing, but are also unchanged from   prior. Again seen are changes of posterior spinal fusion from L3-5. Grossly stable severe spinal stenosis at L2-3 and moderate spinal stenosis at   L1-2. High-grade neural foraminal stenoses at these levels are grossly   stable. Note that CT is inferior to MRI for the evaluation of spinal/neural   foraminal stenosis. MRI could also better evaluate the extent of   discitis-osteomyelitis.                 Consults:     IP CONSULT TO CARDIOLOGY  IP CONSULT TO NEUROSURGERY  IP CONSULT TO NEPHROLOGY  PHARMACY TO DOSE VANCOMYCIN  IP CONSULT TO DIETITIAN  IP CONSULT TO NEUROSURGERY  IP CONSULT TO CARDIAC REHAB    Disposition:  Return to SNF    Condition at Discharge: Stable    Discharge Instructions/Follow-up:    Catherine Dobbins MD  [address] [place] [address] [address]     Call today  to schedule hospital follow up appointment, within 5-7 days    Bette Blank MD  [address] [place] [address] [phone]    Schedule an appointment as soon as possible for a visit in 2 weeks  for osteomyelitis    [address] Me[address] MD  [address] [place] [address] Bruce Correa    Schedule an appointment as soon as possible for a visit in 2 weeks  for back surgery    Jocy Campos MD  [phone]. [phone]             Code Status:  Full Code     Activity: activity as tolerated    Diet: diabetic diet and renal diet      Discharge Medications:     Current Discharge Medication List           Details   oxyCODONE-acetaminophen (PERCOCET) 5-325 MG per tablet Take 1 tablet by mouth every 6 hours as needed for Pain for up to 3 days. Qty: 12 tablet, Refills: 0    Comments: Reduce doses taken as pain becomes manageable  Associated Diagnoses: Intractable low back pain; Lumbar foraminal stenosis      aspirin 81 MG EC tablet Take 1 tablet by mouth daily  Qty: 30 tablet, Refills: 0              Details   vancomycin (VANCOCIN) infusion Infuse 750 mg intravenously three times a week Compound per protocol. Qty: 9 g, Refills: 1      midodrine (PROAMATINE) 5 MG tablet Take 5 mg by mouth daily as needed      gabapentin (NEURONTIN) 100 MG capsule Take 2 capsules by mouth 3 times daily for 180 days.   Qty: 180 capsule, Refills: 5    Associated Diagnoses: Medication refill      atorvastatin (LIPITOR) 80 MG tablet Take 80 mg by mouth nightly      bumetanide (BUMEX) 2 MG tablet Take 2 mg by mouth three times a week Given Sunday,Tuesday,Thursday      isosorbide mononitrate (IMDUR) 30 MG extended release tablet Take 30 mg by mouth daily      insulin glargine (LANTUS) 100 UNIT/ML injection vial Inject 5 Units into the skin nightly       lidocaine-prilocaine (EMLA) 2.5-2.5 % cream Apply topically as needed for Pain       metoprolol succinate (TOPROL XL) 25 MG extended release tablet Take 1 tablet by mouth daily  Qty: 90 tablet, Refills: 1    Associated Diagnoses: Medication refill      lanthanum (FOSRENOL) 1000 MG chewable tablet Take 1,000 mg by mouth 3 times daily (with meals)       allopurinol (ZYLOPRIM) 100 MG tablet Take 1 tablet by mouth daily  Qty: 90 tablet, Refills: 1    Associated Diagnoses: Medication refill      ticagrelor (BRILINTA) 90 MG TABS tablet Take 1 tablet by mouth 2 times daily  Qty: 180 tablet, Refills: 1    Associated Diagnoses: Medication refill      B Complex-C-Folic Acid (BONI CAPS) 1 MG CAPS Take 1 mg by mouth nightly       omeprazole (PRILOSEC) 20 MG delayed release capsule Take 20 mg by mouth daily as needed       LUMIGAN 0.01 % SOLN ophthalmic drops Place 1 drop into the right eye nightly       COMBIGAN 0.2-0.5 % ophthalmic solution Place 1 drop into the right eye every 12 hours   Refills: 7             Time Spent on discharge is more than 30 minutes in the examination, evaluation, counseling and review of medications and discharge plan. Signed:    Florentin Beckford MD   5/8/2022      Thank you Aline Hidalgo MD for the opportunity to be involved in this patient's care. If you have any questions or concerns please feel free to contact me at [phone].

## 2022-05-08 NOTE — DISCHARGE INSTR - COC
Continuity of Care Form    Patient Name: Shakeel Shine   :  1955  MRN:  [id number]    Admit date:  2022  Discharge date:  ***    Code Status Order: Full Code   Advance Directives:      Admitting Physician:  Katerine Miguel DO  PCP: Sarahi Gomes MD    Discharging Nurse: [facility] Unit/Room#: 7194/0700-P  Discharging Unit Phone Number: ***    Emergency Contact:   Extended Emergency Contact Information  Primary Emergency Contact: Berta Boswell, [address] [facility] [address] [facility] Phone: [phone]  Relation: Other  Secondary Emergency Contact: Nirmal,  [address] Phone: [phone]  Relation: Brother/Sister    Past Surgical History:  Past Surgical History:   Procedure Laterality Date    AMPUTATION      right great toe    ANKLE SURGERY      correction on charcot joint of right ankle    BONE BIOPSY N/A 2022    BONE BIOPSY PERCUTANEOUS L1/L2 performed by Preet Niño MD at [address] [facility]  2021    Dr Tyron Pearce Left 2020    [facility] performed by Helga Hassan MD at [address]      bilateral    CHOLECYSTECTOMY      COLONOSCOPY      CORONARY ANGIOPLASTY  2022    Dr. Karolina Jameson - RCA angioplasty    [address] Left 2018    upper arm/Dr. Daylin Gasca    ECHO COMPL W DOP COLOR FLOW  2013         ECHO COMPLETE  2013         FINGER AMPUTATION Left 2022    AMPUTATION OF LEFT THIRD DIGIT, DISTAL PHALANX performed by Tomi Shaw MD at [facility]      right    OTHER SURGICAL HISTORY  2011    PPV, membranectomy, laser Right eye    OTHER SURGICAL HISTORY  insertion lumbar drain insertion    10/12/`14    OTHER SURGICAL HISTORY  10/22/2015    percutaneous lead placement for spinal cord stimulator    OTHER SURGICAL HISTORY  2015    Spinal; cord stimulator- turned off as of 3-10-20     VT AV ANAST,UP ARM BASILIC VEIN TRANSPOSIT Left 05/15/2018    TRANSPOSITION STAGE II AV FISTULA - LEFT UPPER ARM performed by Nikia Camargo MD at [address] [facility] ANGIOACCESS AV FISTULA Left 09/25/2018    SUPERFICIALIZATION AV FISTULA - LEFT ARM performed by Nikia Camargo MD at [address] [facility]IN[facility] [place] W/VITRECTOMY ANY METH Left 04/10/2018    PARS PLANA VITRECTOMY 22 GAUGE RETINAL DETACHMENT REPAIR air fluid exchange, endolaser performed by Karine Allen MD at [address] [facility]      L2    TRANSESOPHAGEAL ECHOCARDIOGRAM  11/11/2021    Dr. Cordova Kadoka    TRANSESOPHAGEAL ECHOCARDIOGRAM  04/19/2022        TUNNELED VENOUS CATHETER PLACEMENT  11/15/2017    VITRECTOMY Left 04/10/2018    PARS PLANA VITRECTOMY; RETINAL DETACHMENT REPAIR; GAS BUBBLE; LASER LEFT EYE       Immunization History:   Immunization History   Administered Date(s) Administered    COVID-19, Alex Coronadost, Primary or Immunocompromised, PF, 100mcg/0.5mL 03/11/2021, 04/08/2021    Pneumococcal Polysaccharide ([id number]) 12/04/2018       Active Problems:  Patient Active Problem List   Diagnosis Code    Diabetic retinopathy ([facility] [address] E11.319    Malignant neoplasm of right female breast ([address] C50.911    Atherosclerosis of native coronary artery of native heart without angina pectoris I25.10    Moderate obesity E66.8    Left ventricular hypertrophy I51.7    Herniated lumbar intervertebral disc M51.26    Lumbar degenerative disc disease M51.36    Pseudomeningocele G96.198    Lumbar radiculopathy M54.16    Lymphedema of arm I89.0    CKD (chronic kidney disease) stage 4, GFR 15-29 ml/min ([facility]) N18.4    Insulin dependent type 2 diabetes mellitus ([facility]) E11.9, Z79.4    Anemia of chronic disease D63.8    Chronic diastolic CHF (congestive heart failure) ([facility]) I50.32    Neuropathy G62.9    Hypertension I10    Glaucoma H40.9    Refusal of blood product Z78.9    Pancytopenia ([facility] [address] D61.818    Controlled type 2 diabetes mellitus with chronic kidney disease on chronic dialysis, with long-term current use of insulin ([facility]) E11.22, N18.6, Z79.4, Z99.2    Vitreous hemorrhage ([facility] [address] H43.10    Patient is Confucianism Z78.9    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 ([facility] [address] E11.649    Hyperkalemia, diminished renal excretion E87.5    Chronic pain syndrome G89.4    Lumbar post-laminectomy syndrome M96.1    Myalgia M79.10    Cervicalgia M54.2    Diabetic peripheral neuropathy ([facility]) E11.42    ESRD (end stage renal disease) ([facility]) N18.6    Bilateral carpal tunnel syndrome G56.03    Spinal stenosis of lumbar region with neurogenic claudication M48.062    Cardiac arrest ([facility]) I46.9    ESRD (end stage renal disease) on dialysis ([facility]) N18.6, Z99.2    Mixed hyperlipidemia E78.2    Lymphedema of right upper extremity I89.0    Coronary artery disease involving native coronary artery of native heart with angina pectoris ([facility]) I25.119    Ventricular tachycardia ([facility]) I47.2    Mitral valve disease I05.9    CAD in native artery I25.10    Lumbar stenosis without neurogenic claudication M48.061    Lumbar foraminal stenosis M48.061    Back pain M54.9    Intractable low back pain M54.59    Unable to ambulate R26.2    NSTEMI (non-ST elevated myocardial infarction) ([facility]) I21.4    Moderate protein-calorie malnutrition ([facility]) E44.0       Isolation/Infection:   Isolation            No Isolation          Patient Infection Status       Infection Onset Added Last Indicated Last Indicated By Review Planned Expiration Resolved Resolved By    None active    Resolved    COVID-19 (Rule Out) 05/19/21 05/19/21 05/19/21 Respiratory Panel, Molecular, with COVID-19 (Restricted: peds pts or suitable admitted adults) (Ordered)   05/19/21 Rule-Out Test Resulted    COVID-19 (Rule Out) 09/17/20 09/17/20 09/17/20 Covid-19 Ambulatory (Ordered)   09/18/20 Rule-Out Test Resulted            Nurse Assessment:  Last Vital Signs: BP (!) 89/52   Pulse 52   Temp 98.1 °F (36.7 °C) (Oral) Resp 16   Ht 5' 10\" (1.778 m)   Wt 168 lb 6.9 oz (76.4 kg)   SpO2 97%   BMI 24.17 kg/m²     Last documented pain score (0-10 scale): Pain Level: 5  Last Weight:   Wt Readings from Last 1 Encounters:   22 168 lb 6.9 oz (76.4 kg)     Mental Status:  {IP PT MENTAL STATUS:77553}    IV Access:  { YOKO IV ACCESS:105226517}    Nursing Mobility/ADLs:  Walking   {CHP DME UGRB:914025832}  Transfer  {CHP DME XDAS:844153168}  Bathing  {CHP DME YQHA:323610685}  Dressing  {CHP DME XSJI:864319990}  Toileting  {CHP DME LNTO:764546637}  Feeding  {CHP DME CYBD:826935294}  Med Admin  {CHP DME VHH}  Med Delivery   { YOKO MED Delivery:520500183}    Wound Care Documentation and Therapy:  Wound 22 Buttocks Right red,open (Active)   Dressing/Treatment Open to air 22 1415   Wound Length (cm) 0.6 cm 22 0050   Wound Width (cm) 1.2 cm 22 0050   Wound Depth (cm) 0.2 cm 22 0050   Wound Surface Area (cm^2) 0.72 cm^2 22 0050   Wound Volume (cm^3) 0.144 cm^3 22 0050   Wound Assessment Pink/red;Dry 22 1415   Drainage Amount None 22 1415   Odor None 22 0050   Maria Esther-wound Assessment Dry/flaky; Intact 22 0050   Number of days: 5       Wound 22 Buttocks Left dry,red (Active)   Dressing/Treatment Open to air 22 0816   Wound Length (cm) 0.7 cm 22 0050   Wound Width (cm) 0.3 cm 22 0050   Wound Depth (cm) 0.1 cm 22 0050   Wound Surface Area (cm^2) 0.21 cm^2 22 0050   Wound Volume (cm^3) 0.021 cm^3 22 0050   Wound Assessment Pink/red 22 0816   Drainage Amount None 22 0050   Odor None 22 0050   Maria Esther-wound Assessment Dry/flaky; Intact 22 005   Number of days: 5       Incision 22 Finger (Comment which one) Left (Active)   Number of days: 108        Elimination:  Continence:    Bowel: {YES / KE:15820}  Bladder: {YES / VW:82043}  Urinary Catheter: {Urinary Catheter:113519018}   Colostomy/Ileostomy/Ileal Conduit: {YES / [place]:29038}       Date of Last BM: ***    Intake/Output Summary (Last 24 hours) at 2022 1329  Last data filed at 2022 1100  Gross per 24 hour   Intake 540 ml   Output --   Net 540 ml     I/O last 3 completed shifts:   In: 56 [P.O.:890]  Out: -     Safety Concerns:     [address] Concerns:947310741}    Impairments/Disabilities:      [address] YOKO Impairments/Disabilities:320781432}    Nutrition Therapy:  Current Nutrition Therapy:   [address] [facility] Diet List:560278933}    Routes of Feeding: {CHP DME Other Feedings:233093677}  Liquids: {Slp liquid thickness:49332}  Daily Fluid Restriction: {CHP DME Yes amt example:349175634}  Last Modified Barium Swallow with Video (Video Swallowing Test): {Done Not Done PEKL:290308710}    Treatments at the Time of Hospital Discharge:   Respiratory Treatments: ***  Oxygen Therapy:  {Therapy; copd oxygen:29951}  Ventilator:    { CC Vent SCHP:941089084}    Rehab Therapies: {THERAPEUTIC INTERVENTION:5242111463}  Weight Bearing Status/Restrictions: [facility] Weight Bearin}  Other Medical Equipment (for information only, NOT a DME order):  {EQUIPMENT:289774583}  Other Treatments: ***    Patient's personal belongings (please select all that are sent with patient):  {CHP DME Belongings:648926006}    RN SIGNATURE:  {Esignature:613549747}    CASE MANAGEMENT/SOCIAL WORK SECTION    Inpatient Status Date: ***    Readmission Risk Assessment Score:  Readmission Risk              Risk of Unplanned Readmission:  37           Discharging to Facility/ Agency   Name: [facility]  Address:  Phone:  Fax:    Dialysis Facility (if applicable)   Name: [facility]  Address:  Dialysis Schedule: //  Phone:  Fax:    / signature: Electronically signed by YRN Herrera on 22 at 2:10 PM EDT    PHYSICIAN SECTION    Prognosis: Good    Condition at Discharge: Stable    Rehab Potential (if transferring to Rehab): Good    Recommended Labs or Other Treatments After Discharge: N/A    Physician Certification: I certify the above information and transfer of Lisset Rollins  is necessary for the continuing treatment of the diagnosis listed and that she requires Merged with [facility] for less 30 days.      Update Admission H&P: No change in H&P    PHYSICIAN SIGNATURE:  Electronically signed by Yolie Mccray MD on 5/8/22 at 1:29 PM EDT

## 2022-05-08 NOTE — PROGRESS NOTES
Difficulty with ambulation,  Nothing new to add from neurosurgical stand point at this time. There is no change in patient's neurosurgical status. Will continue to follow along.

## 2022-05-08 NOTE — PROGRESS NOTES
Pharmacy Consultation Note  (Antibiotic Dosing and Monitoring)    Initial consult date: 5/3/22  Consulting physician/provider: Dr. Sheryle Hopping  Drug: Vancomycin  Indication: Osteomyelitis     Age/  Gender Height Weight IBW  Allergy Information   67 y.o./female 5' 10\" (177.8 cm)  5' 10\" (177.8 cm) 163 lb (73.9 kg)     Ideal body weight: 68.5 kg (151 lb 0.2 oz)  Adjusted ideal body weight: 71.7 kg (157 lb 15.7 oz)   Furosemide      Renal Function:  Recent Labs     05/06/22  0745 05/07/22  0600 05/08/22  0600   BUN 17 9 17   CREATININE 3.8* 2.7* 3.7*       Intake/Output Summary (Last 24 hours) at 5/8/2022 0745  Last data filed at 5/8/2022 0433  Gross per 24 hour   Intake 590 ml   Output --   Net 590 ml       Vancomycin Monitoring:  Trough:  No results for input(s): VANCOTROUGH in the last 72 hours. Random:    Recent Labs     05/06/22  0745 05/08/22  0600   VANCORANDOM 17.4 23.5       Vancomycin Administration Times:    Date  SCr/CrCl       or HD Drug/Dose Time   Given Level(s)   (Time)   5/3 No HD -- -- 22.9   (0400)   5/4 HD x4 hrs Vancomycin 1000 mg IV x1 <1700> 18.8   (0710)   5/5 No HD -- -- 19.7   (0630)   5/6 HD x 4 hrs Vancomycin 1250 IV x 1 14:31 17.4  (0745)   5/7 No HD -- -- --            Assessment:  · Patient is a 78 y.o. female who has been continued on vancomycin for Hx osteomyelitis from last admission  · Receiving vancomycin 750 mg every MWF with dialysis x 6 weeks (End date 6/1/2022) per ID  Estimated Creatinine Clearance: 16 mL/min (A) (based on SCr of 3.7 mg/dL (H)). · To dose vancomycin, pharmacy will be utilizing dosing based off of levels due to patient requiring hemodialysis   · Unclear if dose of vancomycin was given yesterday afternoon (5/4). Patient told RN that she received a dose in dialysis, but no dose was charted. · 5/5: AM level 19.7- it appears that patient did receive dose of vanco, but it was not charted in dialysis  · 5/6: pre-HD level 17.4 mcg/mL.  HD x 4 hours today  · 5/7: no HD scheduled for today  · 5/8: No HD today; schedule of MWF; Random level this morning 23.5 mg/L; expect post-HD level to be ~17-18 mg/L      Plan:  · No vancomycin today  · Will redose post-HD on 5/8  · Will continue to monitor renal function   · Clinical pharmacy to follow    Thomas CelestinD  Pharmacy Resident  P: [id number]  5/8/2022 7:45 AM

## 2022-05-08 NOTE — PROGRESS NOTES
Progress Note  5/8/2022 10:49 AM  Subjective:   Admit Date: 5/2/2022  PCP: Austyn Lucas MD  Interval History: Patient examined , sleepy , in no distress     Diet: ADULT DIET; Regular; Low Fat/Low Chol/High Fiber/2 gm Na; Low Sodium (2 gm); 1200 ml    Data:   Scheduled Meds:   heparin (porcine)  5,000 Units SubCUTAneous BID    sodium chloride flush  5-40 mL IntraVENous 2 times per day    allopurinol  100 mg Oral Daily    atorvastatin  80 mg Oral Nightly    b complex-C-folic acid  1 mg Oral Nightly    bumetanide  2 mg Oral Once per day on Sun Tue Thu    gabapentin  200 mg Oral TID    insulin glargine-yfgn  5 Units SubCUTAneous Nightly    lanthanum  1,000 mg Oral TID WC    latanoprost  1 drop Right Eye Nightly    metoprolol succinate  25 mg Oral Daily    pantoprazole  40 mg Oral QAM AC    brimonidine  1 drop Right Eye BID    And    timolol  1 drop Right Eye BID    ticagrelor  90 mg Oral BID    vancomycin (VANCOCIN) intermittent dosing (placeholder)   Other RX Placeholder    aspirin  81 mg Oral Daily    epoetin fani-epbx  2,000 Units SubCUTAneous Q MWF    alteplase  2 mg IntraCATHeter Once     Continuous Infusions:   sodium chloride      sodium chloride      dextrose       PRN Meds:sodium chloride flush, sodium chloride, acetaminophen, midodrine, sodium chloride, ondansetron **OR** ondansetron, polyethylene glycol, [DISCONTINUED] acetaminophen **OR** acetaminophen, lidocaine-prilocaine, oxyCODONE-acetaminophen, fentanNYL, glucose, dextrose, glucagon (rDNA), dextrose, heparin flush  I/O last 3 completed shifts: In: 56 [P.O.:890]  Out: -   No intake/output data recorded.     Intake/Output Summary (Last 24 hours) at 5/8/2022 1049  Last data filed at 5/8/2022 0433  Gross per 24 hour   Intake 540 ml   Output --   Net 540 ml     CBC:   Recent Labs     05/06/22  0745 05/07/22  0600 05/08/22  0600   WBC 5.5 4.8 4.2*   HGB 9.3* 8.9* 9.0*    203 184     BMP:    Recent Labs     05/06/22  0745 05/07/22  0600 05/08/22  0600    135 135   K 4.0 4.0 4.1   CL 97* 96* 97*   CO2 25 29 28   BUN 17 9 17   CREATININE 3.8* 2.7* 3.7*   GLUCOSE 104* 55* 77     Hepatic:   Recent Labs     05/06/22  0745 05/07/22  0600 05/08/22  0600   AST 37* 33* 27   ALT 11 11 11   BILITOT 0.3 0.4 0.3   ALKPHOS 127* 130* 125*     Troponin: No results for input(s): TROPONINI in the last 72 hours. BNP: No results for input(s): BNP in the last 72 hours. Lipids: No results for input(s): CHOL, HDL in the last 72 hours. Invalid input(s): LDLCALCU  ABGs: No results found for: PHART, PO2ART, [id number]  INR: No results for input(s): INR in the last 72 hours. -----------------------------------------------------------------  RAD: CT ABDOMEN PELVIS WO CONTRAST Additional Contrast? None    Result Date: 5/2/2022  EXAMINATION: CT OF THE ABDOMEN AND PELVIS WITHOUT CONTRAST 5/2/2022 5:15 pm TECHNIQUE: CT of the abdomen and pelvis was performed without the administration of intravenous contrast. Multiplanar reformatted images are provided for review. Dose modulation, iterative reconstruction, and/or weight based adjustment of the mA/kV was utilized to reduce the radiation dose to as low as reasonably achievable. COMPARISON: CT abdomen and pelvis, 11/07/2017. HISTORY: ORDERING SYSTEM PROVIDED HISTORY: abd pain TECHNOLOGIST PROVIDED HISTORY: Reason for exam:->abd pain Additional Contrast?->None Decision Support Exception - unselect if not a suspected or confirmed emergency medical condition->Emergency Medical Condition (MA) FINDINGS: Lower Chest: The heart is mildly enlarged. No pleural or pericardial effusion. Mild atelectasis in the dependent aspect of the left lower lobe. Organs: Liver: Unremarkable. Gallbladder: Surgically absent Pancreas: Unremarkable. Spleen:  Unremarkable. Adrenals: Stable nodularity in the adrenal glands, which the is likely due to the presence of adenomas.  Kidneys: The kidneys are bilaterally atrophic with multiple cysts. No hydronephrosis. GI/Bowel: Moderate fecal retention is seen in the colon. No evidence of bowel wall thickening or obstruction. Mild-to-moderate distal colonic diverticulosis without diverticulitis. Normal appendix. Pelvis: The urinary bladder is unremarkable. Within limits of the CT technique, the uterus and the adnexa are grossly unremarkable. Peritoneum/Retroperitoneum: There is prominent calcified atherosclerosis in the medium and small-caliber arteries, characteristic of Monckeberg medial calcific sclerosis, which is often seen in the setting of diabetes and/or chronic renal disease. Prominent calcified atherosclerosis also seen in the abdominal aorta. No evidence of aneurysm. No lymphadenopathy. No free air or free fluid is seen. Bones/Soft Tissues: Bones are diffusely sclerotic likely due to renal osteodystrophy. Status post decompressive laminectomies with posterior interbody fusion from L3-L5. Severe destructive endplate changes at W7-4 are grossly stable. There is progressive loss of height in the L2 vertebral body. There is stable grade 1-2 anterolisthesis of L2 over L3. There is interval development of erosive changes along the inferior endplate of the L1 vertebral body. Spinal stimulator in situ, terminating in the posterior epidural space at T8-9.     1. Erosive changes along the inferior endplate of the L1 vertebral body, which has developed in the interim since the previous CT from 11/07/2017. It may be degenerative, related to spondyloarthropathy of dialysis (related to accumulation of amyloid) or discitis/osteomyelitis. Consider further characterization with MRI. 2. Chronic destructive changes along the endplates at Z3-9 with progressive loss of height in the L2 vertebral body. 3. Severe atherosclerosis, within appearance suggestive of Monckeberg medial calcific sclerosis, as may be seen with diabetes and end-stage renal disease. 4. Moderate fecal retention in the colon. No bowel wall thickening or evidence of obstruction. 5. Stable adrenal nodules, likely adenomas. RECOMMENDATIONS: Unavailable     CT LUMBAR SPINE WO CONTRAST    Result Date: 5/2/2022  EXAMINATION: CT OF THE LUMBAR SPINE WITHOUT CONTRAST  5/2/2022 TECHNIQUE: CT of the lumbar spine was performed without the administration of intravenous contrast. Multiplanar reformatted images are provided for review. Adjustment of mA and/or kV according to patient size was utilized. Dose modulation, iterative reconstruction, and/or weight based adjustment of the mA/kV was utilized to reduce the radiation dose to as low as reasonably achievable. COMPARISON: CT lumbar spine 04/13/2022 HISTORY: ORDERING SYSTEM PROVIDED HISTORY: back pain TECHNOLOGIST PROVIDED HISTORY: Reason for exam:->back pain Decision Support Exception - unselect if not a suspected or confirmed emergency medical condition->Emergency Medical Condition (MA) FINDINGS: BONES/ALIGNMENT: Again seen are changes of posterior transpedicular fusion L3-L5. Again seen are severe erosive changes at the L1-2 and L2-3 endplates with height loss of these endplates again noted. There is stable associated grade 1 anterolisthesis of L2 on L3. Schmorl's nodes are noted at multiple levels. DEGENERATIVE CHANGES: Severe spinal stenosis again seen at L2-3 secondary to the spondylolisthesis and likely inflammatory change. Calcified disc bulge and inflammatory change at L1-2 result in likely moderate spinal stenosis. No high-grade spinal stenosis is seen at the fused levels, allowing for limitations from hardware related artifact. High-grade neural foraminal stenosis is suspected at L1-2 and L2-3 secondary to the inflammatory change. SOFT TISSUES/RETROPERITONEUM: Edematous bilateral psoas muscles. Paravertebral edema is suspected at the L1-2 and L2-3 levels. Bilateral renal cysts. Atrophic kidneys. Extensive atherosclerotic disease. Colonic diverticulosis.   A generator pack is present in the right paraspinal tissues at the lumbosacral junction, with a catheter ascending in the paraspinal subcutaneous tissues superiorly beyond the field of view. Again seen are severe erosive changes at the L1-2 and L2-3 endplates, highly suggestive discitis-osteomyelitis. Associated spondylolisthesis and inflammatory change again results in severe spinal stenosis at L2-3 and moderate spinal stenosis at L1-2. Further evaluation with contrast enhanced MRI of the lumbar spine is recommended. XR CHEST PORTABLE    Result Date: 5/2/2022  EXAMINATION: ONE XRAY VIEW OF THE CHEST 5/2/2022 4:25 pm COMPARISON: Chest CT 7 December 2021 and chest radiograph 19 May 2021 HISTORY: ORDERING SYSTEM PROVIDED HISTORY: back/ abd pain TECHNOLOGIST PROVIDED HISTORY: Reason for exam:->back/ abd pain FINDINGS: Unchanged implanted central venous catheter on the right and thoracic spinal neurostimulator. Airspace disease has resolved. I suspect that this represented cardiogenic edema. Heart is enlarged. Pulmonary vascularity is normal.  Neither costophrenic angle is blunted. Interval resolution of suspected cardiogenic edema. Cardiomegaly. See above. CTA PULMONARY W CONTRAST    Result Date: 5/2/2022  EXAMINATION: CTA OF THE CHEST 5/2/2022 6:42 pm TECHNIQUE: CTA of the chest was performed after the administration of intravenous contrast.  Multiplanar reformatted images are provided for review. MIP images are provided for review. Dose modulation, iterative reconstruction, and/or weight based adjustment of the mA/kV was utilized to reduce the radiation dose to as low as reasonably achievable. COMPARISON: Unenhanced chest CT study from December 7, 2021. Lumbar spine CT study from May 2, 2022.  HISTORY: ORDERING SYSTEM PROVIDED HISTORY: rule out pe back pain TECHNOLOGIST PROVIDED HISTORY: Reason for exam:->rule out pe back pain Decision Support Exception - unselect if not a suspected or confirmed emergency medical condition->Emergency Medical Condition (MA) FINDINGS: Pulmonary Arteries: Pulmonary arteries are adequately opacified for evaluation. No evidence of intraluminal filling defect to suggest pulmonary embolism. Main pulmonary artery is normal in caliber. Mediastinum: The heart is enlarged. A right jugular vein chest port catheter ends in the distal SVC. Lianna Money The thoracic aorta and proximal great vessels are patent and of normal caliber. No pericardial effusion. Diffuse left breast skin thickening with several chest wall collateral vessels again noted. There are a few mildly prominent supraclavicular and upper mediastinal lymph nodes noted, not significantly changed, nor is a left lower pole thyroid nodule. Lungs/pleura: The trachea and mainstem bronchi are widely patent. Minimal atelectasis is present at the left lung base. The lungs are otherwise clear. No discrete pulmonary nodule or mass. No pleural effusion or pneumothorax. Soft Tissues/Bones: Degenerative changes are present throughout the thoracic spine. Incompletely healed fracture of the upper sternal body, unchanged. There is disc and endplate destruction of the upper lumbar spine which is seen to better extent on the lumbar spine CT study. Upper Abdomen: The native kidneys are again seen to be somewhat atrophic. Extensive arterial vascular calcifications are noted. Diffuse bilateral adrenal gland enlargement, unchanged. The gallbladder is surgically absent     No evidence of pulmonary embolism or acute thoracic aortic abnormality. Cardiomegaly. Minimal left basilar atelectasis. Otherwise, clear lungs. Destructive process of the upper lumbar spine at the L1-L2 level, only partially imaged. This is seen to better extent on the lumbar spine MRI of the same day.        Objective:   Vitals: BP (!) 89/52   Pulse 52   Temp 98.1 °F (36.7 °C)   Resp 16   Ht 5' 10\" (1.778 m)   Wt 168 lb 6.9 oz (76.4 kg)   SpO2 97%   BMI 24.17 kg/m²   General appearance: appears stated age   Skin:  No rashes or lesions  HEENT: Head: Normocephalic, no lesions, without obvious abnormality.   Neck: no adenopathy, no carotid bruit, no JVD, supple, symmetrical, trachea midline and thyroid not enlarged, symmetric, no tenderness/mass/nodules  Lungs: clear to auscultation bilaterally  Heart: regular rate and rhythm, S1, S2 normal, no murmur, click, rub or gallop  Abdomen: soft, non-tender; bowel sounds normal; no masses,  no organomegaly  Extremities: extremities normal, atraumatic, no cyanosis or edema  Neurologic: Mental status: Alert, oriented, thought content appropriate    Assessment:   Patient Active Problem List:     Diabetic retinopathy ([address]     Malignant neoplasm of right female breast ([address]     Atherosclerosis of native coronary artery of native heart without angina pectoris     Moderate obesity     Left ventricular hypertrophy     Herniated lumbar intervertebral disc     Lumbar degenerative disc disease     Pseudomeningocele     Lumbar radiculopathy     Lymphedema of arm     CKD (chronic kidney disease) stage 4, GFR 15-29 ml/min ([facility])     Insulin dependent type 2 diabetes mellitus ([facility])     Anemia of chronic disease     Chronic diastolic CHF (congestive heart failure) ([facility])     Neuropathy     Hypertension     Glaucoma     Refusal of blood product     Pancytopenia ([facility])     Controlled type 2 diabetes mellitus with chronic kidney disease on chronic dialysis, with long-term current use of insulin ([facility])     Vitreous hemorrhage ([address]     Patient is [employer]     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 ([facility])     Hyperkalemia, diminished renal excretion     Chronic pain syndrome     Lumbar post-laminectomy syndrome     Myalgia     Cervicalgia     Diabetic peripheral neuropathy ([facility])     ESRD (end stage renal disease) ([facility])     Bilateral carpal tunnel syndrome     Spinal stenosis of lumbar region with neurogenic claudication     Cardiac arrest ([address] ESRD (end stage renal disease) on dialysis [facility])     Mixed hyperlipidemia     Lymphedema of right upper extremity     Coronary artery disease involving native coronary artery of native heart with angina pectoris (HCC)     Ventricular tachycardia (HCC)     Mitral valve disease     CAD in native artery     Lumbar stenosis without neurogenic claudication     Lumbar foraminal stenosis     Back pain     Intractable low back pain     Unable to ambulate     NSTEMI (non-ST elevated myocardial infarction) [address]     Moderate protein-calorie malnutrition ([facility]    Plan:     Assessment and Plans:     1.  ESRD on HD  OP [facility] MWF 1st shift   left AV fistula  Continue HD MWF     2.  NSTEMI  Troponin 535 on 5/2--> 1388 on 5/3  On heparin drip  S/p cardiac catheterization with balloon angioplasty 5/5  Cardiology following     3.  Back pain  Status post bone biopsy 4/18 for lumbar spine osteomyelitis  On vancomycin  Neurosurgery following     4.  Hypertension  BP goal<140/90  BP at goal  Monitor on current regimen and with HD     5.  Anemia  Hemoglobin target 10-12  Hemoglobin 9.3 today  Continue JAYSHREE as hemoglobin below target  Transfuse for hemoglobin<7  Monitor H&H     6.  Secondary hyperparathyroidism of renal origin  PTH noted in the outpatient setting on 4/25/2022 was 443  Phosphorus 3.2 on 5/4  Continue on Fosrenol  Monitor labs          Uday Stephenson MD

## 2022-05-08 NOTE — PROGRESS NOTES
Per  notes, patient requires precert before discharge including PT/OT assessment. No notes showing therapy has engaged patient d/t waiting neurosurgery recs. Dr. Christy Avila notified that patient will not be discharged today d/t issues above.

## 2022-05-08 NOTE — PROGRESS NOTES
Hospitalist Progress Note      PCP: Greta Hines MD    Date of Admission: 5/2/2022    Hospital Course:   \" 78 y.o. female who past medical history that includes lumbar osteomyelitis, coronary artery disease, ESRD, anemia, hypertension, hyperlipidemia, diabetes, history of bladder carcinoma presented with back pain and abdominal pain for several weeks and is worsening over the past few days. She was recently admitted due to osteomyelitis. She was recently discharged to rehab  on vancomycin. In the ER, there was concern for STEMI on EKG. Interventional cardiology was consulted from the ER and they advised no acute STEMI on EKG however they will plan to do a PCI in the morning. She was placed on heparin drip. He was transferred to see Bernice Barron for continued work-up. ER also spoke with neurosurgeon, Dr. Rivera Service for continued signs of osteomyelitis. In ER, routine labwork was performed which revealed creatinine 2.5, troponin 323->535. EKG revealed atrial fibrillationST elevation noted in leads aVR ST depressions noted in leads V4 through V6 as well as in leads I to, ST elevation in leads V1. Repeat EKG. The patient received heparin drip in the emergency room and was admitted under the care of [facility] physicians. \"       Subjective:    Patient has back pain. She complains of RLE pain and weakness.     Medications:  Reviewed    Infusion Medications    sodium chloride      sodium chloride      dextrose       Scheduled Medications    heparin (porcine)  5,000 Units SubCUTAneous BID    sodium chloride flush  5-40 mL IntraVENous 2 times per day    allopurinol  100 mg Oral Daily    atorvastatin  80 mg Oral Nightly    b complex-C-folic acid  1 mg Oral Nightly    bumetanide  2 mg Oral Once per day on Sun Tue Thu    gabapentin  200 mg Oral TID    insulin glargine-yfgn  5 Units SubCUTAneous Nightly    lanthanum  1,000 mg Oral TID     latanoprost  1 drop Right Eye Nightly    metoprolol succinate  25 mg Oral Daily    pantoprazole  40 mg Oral QAM AC    brimonidine  1 drop Right Eye BID    And    timolol  1 drop Right Eye BID    ticagrelor  90 mg Oral BID    vancomycin (VANCOCIN) intermittent dosing (placeholder)   Other RX Placeholder    aspirin  81 mg Oral Daily    epoetin fani-epbx  2,000 Units SubCUTAneous Q MWF    alteplase  2 mg IntraCATHeter Once     PRN Meds: sodium chloride flush, sodium chloride, acetaminophen, midodrine, sodium chloride, ondansetron **OR** ondansetron, polyethylene glycol, [DISCONTINUED] acetaminophen **OR** acetaminophen, lidocaine-prilocaine, oxyCODONE-acetaminophen, fentanNYL, glucose, dextrose, glucagon (rDNA), dextrose, heparin flush      Intake/Output Summary (Last 24 hours) at 5/8/2022 1043  Last data filed at 5/8/2022 0433  Gross per 24 hour   Intake 540 ml   Output --   Net 540 ml       Physical Exam Performed:    BP (!) 89/52   Pulse 52   Temp 98.1 °F (36.7 °C)   Resp 16   Ht 5' 10\" (1.778 m)   Wt 168 lb 6.9 oz (76.4 kg)   SpO2 97%   BMI 24.17 kg/m²     General appearance: No apparent distress, appears stated age and cooperative. HEENT: Pupils equal, round, and reactive to light. Conjunctivae/corneas clear. Neck: Supple, with full range of motion. R port-a-cath  Respiratory:  Normal respiratory effort. Clear to auscultation, bilaterally without Rales/Wheezes/Rhonchi. Cardiovascular: Regular rate and rhythm with normal S1/S2 without murmurs, rubs or gallops. Abdomen: Soft, non-tender, non-distended with normal bowel sounds. Musculoskeletal: No clubbing, cyanosis or edema bilaterally. Left UE AVF  Skin: Skin color, texture, turgor normal.  No rashes or lesions.   Neurologic: 4/5 strength in RLE,pt has pain on flexion  Psychiatric: Alert and oriented, thought content appropriate, normal insight      Labs:   Recent Labs     05/06/22  0745 05/07/22  0600 05/08/22  0600   WBC 5.5 4.8 4.2*   HGB 9.3* 8.9* 9.0*   HCT 31.3* 29.7* 30.0*    203 184     Recent Labs 05/06/22  0745 05/07/22  0600 05/08/22  0600    135 135   K 4.0 4.0 4.1   CL 97* 96* 97*   CO2 25 29 28   BUN 17 9 17   CREATININE 3.8* 2.7* 3.7*   CALCIUM 8.5* 8.7 8.5*     Recent Labs     05/06/22  0745 05/07/22  0600 05/08/22  0600   AST 37* 33* 27   ALT 11 11 11   BILITOT 0.3 0.4 0.3   ALKPHOS 127* 130* 125*     No results for input(s): INR in the last 72 hours. No results for input(s): Marni Lowers in the last 72 hours. Urinalysis:      Lab Results   Component Value Date    NITRU Negative 11/07/2017    WBCUA 1-3 11/07/2017    WBCUA NONE 08/27/2011    BACTERIA MODERATE 11/07/2017    RBCUA 0-1 11/07/2017    RBCUA 1-3 02/13/2013    BLOODU Negative 11/07/2017    SPECGRAV 1.025 11/07/2017    GLUCOSEU Negative 11/07/2017    GLUCOSEU NEGATIVE 08/27/2011       Radiology:  CT LUMBAR SPINE WO CONTRAST   Final Result   No significant interval change. Again seen are endplate erosions at M1-8, highly suggestive of osteomyelitis. Erosions at L2-3 are more chronic appearing, but are also unchanged from   prior. Again seen are changes of posterior spinal fusion from L3-5. Grossly stable severe spinal stenosis at L2-3 and moderate spinal stenosis at   L1-2. High-grade neural foraminal stenoses at these levels are grossly   stable. Note that CT is inferior to MRI for the evaluation of spinal/neural   foraminal stenosis. MRI could also better evaluate the extent of   discitis-osteomyelitis. Assessment/Plan:    Active Hospital Problems    Diagnosis     Moderate protein-calorie malnutrition ([address] [E44.0]      Priority: Medium    NSTEMI (non-ST elevated myocardial infarction) ([address] [I21.4]      Priority: Medium       NSTEMI  -Cardiology consulted. Pt had cardiac cath on 5/5/22. S/p PCI for RCA in stent restenosis. -Continue aspirin,brillinta,statin and metoprolol    Osteomyelitis of lumbar spine L1-L2   Hx lumbar fusion  -Neurosurgery consultation. Recommend that patient benefit from a lumbar decompression and fusion once medically stable. -Pt has now RLE pain and weakness. CT lumbar shows no change in previous findings of osteomyelitis. Dr Corbin Otto, recommends no new changes. Plan remains as above.  -Pain control.    -Continue vancomycin with HD. Family wanted update from ID. I reviewed plan for 6 weeks of vanco and plan to follow up with Dr Justina Jarquin. CAD  -As above    End-stage renal disease on hemodialysis- Monday Wednesday Friday,   [facility] neph[facility]. Continue dialysis. Chronic anemia secondary to end-stage renal disease  -JAYSHREE with HD    Essential hypertension  -BP controlled    Diabetes type 2 with end-stage renal disease:   -Continue insulin coverage    Gout without acute attack:   -Continue allopurinol     Other medical problems:  Bladder carcinoma s/p chemotherapy  History of amputation of left hand distal middle finger for osteomyelitis  [employer]       DVT Prophylaxis: Heparin   Diet: ADULT DIET; Regular; Low Fat/Low Chol/High Fiber/2 gm Na;  Low Sodium (2 gm); 1200 ml  Code Status: Full Code    PT/OT Eval Status: As needed    Dispo -Return to Lakeisha Rubio MD

## 2022-05-09 LAB
ALBUMIN SERPL-MCNC: 2.9 G/DL (ref 3.5–5.2)
ALP BLD-CCNC: 142 U/L (ref 35–104)
ALT SERPL-CCNC: 16 U/L (ref 0–32)
ANION GAP SERPL CALCULATED.3IONS-SCNC: 10 MMOL/L (ref 7–16)
AST SERPL-CCNC: 29 U/L (ref 0–31)
BILIRUB SERPL-MCNC: 0.3 MG/DL (ref 0–1.2)
BUN BLDV-MCNC: 28 MG/DL (ref 6–23)
CALCIUM SERPL-MCNC: 9.1 MG/DL (ref 8.6–10.2)
CHLORIDE BLD-SCNC: 94 MMOL/L (ref 98–107)
CO2: 29 MMOL/L (ref 22–29)
CREAT SERPL-MCNC: 5.1 MG/DL (ref 0.5–1)
GFR AFRICAN AMERICAN: 10
GFR NON-AFRICAN AMERICAN: 10 ML/MIN/1.73
GLUCOSE BLD-MCNC: 106 MG/DL (ref 74–99)
HCT VFR BLD CALC: 30.5 % (ref 34–48)
HEMOGLOBIN: 9.3 G/DL (ref 11.5–15.5)
MCH RBC QN AUTO: 27.1 PG (ref 26–35)
MCHC RBC AUTO-ENTMCNC: 30.5 % (ref 32–34.5)
MCV RBC AUTO: 88.9 FL (ref 80–99.9)
METER GLUCOSE: 164 MG/DL (ref 74–99)
METER GLUCOSE: 97 MG/DL (ref 74–99)
PDW BLD-RTO: 17.8 FL (ref 11.5–15)
PLATELET # BLD: 190 E9/L (ref 130–450)
PMV BLD AUTO: 10.5 FL (ref 7–12)
POTASSIUM SERPL-SCNC: 4.4 MMOL/L (ref 3.5–5)
RBC # BLD: 3.43 E12/L (ref 3.5–5.5)
SODIUM BLD-SCNC: 133 MMOL/L (ref 132–146)
TOTAL PROTEIN: 5.9 G/DL (ref 6.4–8.3)
WBC # BLD: 3.9 E9/L (ref 4.5–11.5)

## 2022-05-09 PROCEDURE — 97161 PT EVAL LOW COMPLEX 20 MIN: CPT

## 2022-05-09 PROCEDURE — 82962 GLUCOSE BLOOD TEST: CPT

## 2022-05-09 PROCEDURE — 2580000003 HC RX 258: Performed by: INTERNAL MEDICINE

## 2022-05-09 PROCEDURE — 6360000002 HC RX W HCPCS: Performed by: INTERNAL MEDICINE

## 2022-05-09 PROCEDURE — 97530 THERAPEUTIC ACTIVITIES: CPT

## 2022-05-09 PROCEDURE — 97165 OT EVAL LOW COMPLEX 30 MIN: CPT

## 2022-05-09 PROCEDURE — 90935 HEMODIALYSIS ONE EVALUATION: CPT

## 2022-05-09 PROCEDURE — 97535 SELF CARE MNGMENT TRAINING: CPT

## 2022-05-09 PROCEDURE — 6370000000 HC RX 637 (ALT 250 FOR IP): Performed by: INTERNAL MEDICINE

## 2022-05-09 PROCEDURE — 36415 COLL VENOUS BLD VENIPUNCTURE: CPT

## 2022-05-09 PROCEDURE — 85027 COMPLETE CBC AUTOMATED: CPT

## 2022-05-09 PROCEDURE — S5553 INSULIN LONG ACTING 5 U: HCPCS | Performed by: INTERNAL MEDICINE

## 2022-05-09 PROCEDURE — 80053 COMPREHEN METABOLIC PANEL: CPT

## 2022-05-09 PROCEDURE — 2140000000 HC CCU INTERMEDIATE R&B

## 2022-05-09 RX ADMIN — HEPARIN SODIUM 5000 UNITS: 10000 INJECTION INTRAVENOUS; SUBCUTANEOUS at 13:15

## 2022-05-09 RX ADMIN — LANTHANUM CARBONATE 1000 MG: 500 TABLET, CHEWABLE ORAL at 17:59

## 2022-05-09 RX ADMIN — TICAGRELOR 90 MG: 90 TABLET ORAL at 13:30

## 2022-05-09 RX ADMIN — PANTOPRAZOLE SODIUM 40 MG: 40 TABLET, DELAYED RELEASE ORAL at 06:45

## 2022-05-09 RX ADMIN — EPOETIN ALFA-EPBX 2000 UNITS: 2000 INJECTION, SOLUTION INTRAVENOUS; SUBCUTANEOUS at 13:30

## 2022-05-09 RX ADMIN — ATORVASTATIN CALCIUM 80 MG: 80 TABLET, FILM COATED ORAL at 21:36

## 2022-05-09 RX ADMIN — VANCOMYCIN HYDROCHLORIDE 1000 MG: 1 INJECTION, POWDER, LYOPHILIZED, FOR SOLUTION INTRAVENOUS at 14:00

## 2022-05-09 RX ADMIN — OXYCODONE AND ACETAMINOPHEN 1 TABLET: 5; 325 TABLET ORAL at 06:45

## 2022-05-09 RX ADMIN — ASPIRIN 81 MG: 81 TABLET, COATED ORAL at 13:40

## 2022-05-09 RX ADMIN — TIMOLOL MALEATE 1 DROP: 5 SOLUTION OPHTHALMIC at 13:20

## 2022-05-09 RX ADMIN — GABAPENTIN 200 MG: 100 CAPSULE ORAL at 14:43

## 2022-05-09 RX ADMIN — OXYCODONE AND ACETAMINOPHEN 1 TABLET: 5; 325 TABLET ORAL at 21:09

## 2022-05-09 RX ADMIN — SODIUM CHLORIDE, PRESERVATIVE FREE 10 ML: 5 INJECTION INTRAVENOUS at 21:09

## 2022-05-09 RX ADMIN — TIMOLOL MALEATE 1 DROP: 5 SOLUTION OPHTHALMIC at 21:36

## 2022-05-09 RX ADMIN — TICAGRELOR 90 MG: 90 TABLET ORAL at 21:36

## 2022-05-09 RX ADMIN — Medication 100 UNITS: at 21:08

## 2022-05-09 RX ADMIN — SODIUM CHLORIDE, PRESERVATIVE FREE 10 ML: 5 INJECTION INTRAVENOUS at 06:43

## 2022-05-09 RX ADMIN — BRIMONIDINE TARTRATE 1 DROP: 2 SOLUTION OPHTHALMIC at 13:10

## 2022-05-09 RX ADMIN — NEPHROCAP 1 MG: 1 CAP ORAL at 21:36

## 2022-05-09 RX ADMIN — HEPARIN SODIUM 5000 UNITS: 10000 INJECTION INTRAVENOUS; SUBCUTANEOUS at 21:42

## 2022-05-09 RX ADMIN — ALLOPURINOL 100 MG: 100 TABLET ORAL at 13:30

## 2022-05-09 RX ADMIN — Medication 100 UNITS: at 06:43

## 2022-05-09 RX ADMIN — SODIUM CHLORIDE, PRESERVATIVE FREE 10 ML: 5 INJECTION INTRAVENOUS at 13:45

## 2022-05-09 RX ADMIN — GABAPENTIN 200 MG: 100 CAPSULE ORAL at 21:36

## 2022-05-09 RX ADMIN — INSULIN GLARGINE-YFGN 5 UNITS: 100 INJECTION, SOLUTION SUBCUTANEOUS at 21:42

## 2022-05-09 RX ADMIN — BRIMONIDINE TARTRATE 1 DROP: 2 SOLUTION OPHTHALMIC at 21:36

## 2022-05-09 RX ADMIN — LATANOPROST 1 DROP: 50 SOLUTION OPHTHALMIC at 21:42

## 2022-05-09 ASSESSMENT — PAIN DESCRIPTION - FREQUENCY: FREQUENCY: CONTINUOUS

## 2022-05-09 ASSESSMENT — PAIN DESCRIPTION - LOCATION: LOCATION: BACK;LEG

## 2022-05-09 ASSESSMENT — PAIN SCALES - GENERAL
PAINLEVEL_OUTOF10: 0
PAINLEVEL_OUTOF10: 0
PAINLEVEL_OUTOF10: 7
PAINLEVEL_OUTOF10: 7

## 2022-05-09 ASSESSMENT — PAIN DESCRIPTION - ONSET: ONSET: ON-GOING

## 2022-05-09 ASSESSMENT — PAIN DESCRIPTION - DESCRIPTORS: DESCRIPTORS: ACHING

## 2022-05-09 ASSESSMENT — PAIN DESCRIPTION - ORIENTATION: ORIENTATION: RIGHT

## 2022-05-09 ASSESSMENT — PAIN DESCRIPTION - PAIN TYPE: TYPE: CHRONIC PAIN

## 2022-05-09 NOTE — PROGRESS NOTES
[facility]  Nephrology Progress Note    Patient's Name: Emeka Ray      History of Present Illness from 5/3 consult note:    Neno Aviles is a 78 y.o. female with a past medical history of breast cancer, coronary artery disease, hypertension, hyperlipidemia, and anemia. She presented to the [address] [place] [address] ED on 5/2 with complaints of back and abdominal pain patient was subsequently transferred to [facility] for NSTEMI. Vital signs today include temperature 97.5, respirations 15, pulse 61, BP 98/42, and she was 94% on room air. Lab data from 5/2 include creatinine 2.5, troponin 535, and hemoglobin 9.1. Cardiology is consulted to see the patient. We were consulted to see the patient for dialysis. Patient is known to our service and dialyzes at [address] MWF 1st shift left AV fistula. At present, patient was seen and examined. She reports that she feels \"fair. \"  She reports right hip pain today. She reports that her appetite was fair before coming in to the hospital.  She denied any chest pain or shortness of breath. She denies any nausea, vomiting, or diarrhea. \"    Subjective:    5/9: Patient was seen and examined on HD. She reports that she feels \"fair. \"  She denies any chest pain or shortness of breath. No abdominal pain or nausea. PMH:    Past Medical History:   Diagnosis Date    Acute infection of bone ([address]     infection of rt foot, resolved.     Acute osteomyelitis of phalanx of left hand ([address] 1/27/2022    Left third distal phalanx    Anemia of chronic disease     Arthritis     Breast cancer ([address]     right breast, 2008/ bladder, 2006- last chemotherapy \"years ago\"    CAD (coronary artery disease)     Carpal tunnel syndrome     bilat - for OR left hand 3-17-20     Chronic diastolic CHF (congestive heart failure) ([address] 09/23/2014 9/23/14- echocardiogram revealed moderate LV concentric hypertrophy, stage III diastolic dysfunction, mild tricuspid regurgitation    CKD (chronic kidney disease) stage 4, GFR 15-29 ml/min (HCC)     Diabetic retinopathy (HCC)     Glaucoma     Hemodialysis patient ([address]     [facility]- Dr. Herminia Nguyen - left arm fistula     Hyperkalemia, diminished renal excretion 11/9/2017    Hyperlipidemia     Hypertension     Hypoglycemia unawareness in type 1 diabetes mellitus ([address] 11/7/2017    Insulin dependent type 2 diabetes mellitus ([address]     Neuropathy     feet    Osteomyelitis due to secondary diabetes ([address]     rt great toe with amputation    Patient is Shinto 11/7/2017    Refusal of blood product     patient states she dose not take blood transfusion    Ventricular hypertrophy     Vitreous hemorrhage (HCC)     left eye     Patient Active Problem List   Diagnosis    Diabetic retinopathy ([address]    Malignant neoplasm of right female breast ([address]    Atherosclerosis of native coronary artery of native heart without angina pectoris    Moderate obesity    Left ventricular hypertrophy    Herniated lumbar intervertebral disc    Lumbar degenerative disc disease    Pseudomeningocele    Lumbar radiculopathy    Lymphedema of arm    CKD (chronic kidney disease) stage 4, GFR 15-29 ml/min (HCC)    Insulin dependent type 2 diabetes mellitus (HCC)    Anemia of chronic disease    Chronic diastolic CHF (congestive heart failure) (HCC)    Neuropathy    Hypertension    Glaucoma    Refusal of blood product    Pancytopenia ([address]    Controlled type 2 diabetes mellitus with chronic kidney disease on chronic dialysis, with long-term current use of insulin (HCC)    Vitreous hemorrhage ([address]    Patient is Shinto    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 ([address]    Hyperkalemia, diminished renal excretion    Chronic pain syndrome    Lumbar post-laminectomy syndrome    Myalgia    Cervicalgia    Diabetic peripheral neuropathy ([address]    [facility]) [facility]    Bilateral carpal tunnel syndrome    Spinal stenosis of lumbar region with neurogenic claudication    Cardiac arrest ([facility]    ESRD (end stage renal disease) on dialysis ([facility]    Mixed hyperlipidemia    Lymphedema of right upper extremity    Coronary artery disease involving native coronary artery of native heart with angina pectoris (HCC)    Ventricular tachycardia (HCC)    Mitral valve disease    CAD in native artery    Lumbar stenosis without neurogenic claudication    Lumbar foraminal stenosis    Back pain    Intractable low back pain    Unable to ambulate    NSTEMI (non-ST elevated myocardial infarction) ([facility]    Moderate protein-calorie malnutrition (HCC)     Meds:     heparin (porcine)  5,000 Units SubCUTAneous BID    sodium chloride flush  5-40 mL IntraVENous 2 times per day    allopurinol  100 mg Oral Daily    atorvastatin  80 mg Oral Nightly    b complex-C-folic acid  1 mg Oral Nightly    bumetanide  2 mg Oral Once per day on Sun Tue Thu    gabapentin  200 mg Oral TID    insulin glargine-yfgn  5 Units SubCUTAneous Nightly    lanthanum  1,000 mg Oral TID WC    latanoprost  1 drop Right Eye Nightly    metoprolol succinate  25 mg Oral Daily    pantoprazole  40 mg Oral QAM AC    brimonidine  1 drop Right Eye BID    And    timolol  1 drop Right Eye BID    ticagrelor  90 mg Oral BID    vancomycin (VANCOCIN) intermittent dosing (placeholder)   Other RX Placeholder    aspirin  81 mg Oral Daily    epoetin fani-epbx  2,000 Units SubCUTAneous Q MWF    alteplase  2 mg IntraCATHeter Once      sodium chloride      sodium chloride      dextrose       Meds prn:     sodium chloride flush, sodium chloride, acetaminophen, midodrine, sodium chloride, ondansetron **OR** ondansetron, polyethylene glycol, [DISCONTINUED] acetaminophen **OR** acetaminophen, lidocaine-prilocaine, oxyCODONE-acetaminophen, fentanNYL, glucose, dextrose, glucagon (rDNA), dextrose, heparin flush    Meds prior to admission:     No current facility-administered medications on file prior to encounter. Current Outpatient Medications on File Prior to Encounter   Medication Sig Dispense Refill    vancomycin (VANCOCIN) infusion Infuse 750 mg intravenously three times a week Compound per protocol. 9 g 1    midodrine (PROAMATINE) 5 MG tablet Take 5 mg by mouth daily as needed      gabapentin (NEURONTIN) 100 MG capsule Take 2 capsules by mouth 3 times daily for 180 days. 180 capsule 5    atorvastatin (LIPITOR) 80 MG tablet Take 80 mg by mouth nightly      bumetanide (BUMEX) 2 MG tablet Take 2 mg by mouth three times a week Given Sunday,Tuesday,Thursday      isosorbide mononitrate (IMDUR) 30 MG extended release tablet Take 30 mg by mouth daily      insulin glargine (LANTUS) 100 UNIT/ML injection vial Inject 5 Units into the skin nightly       lidocaine-prilocaine (EMLA) 2.5-2.5 % cream Apply topically as needed for Pain       metoprolol succinate (TOPROL XL) 25 MG extended release tablet Take 1 tablet by mouth daily 90 tablet 1    lanthanum (FOSRENOL) 1000 MG chewable tablet Take 1,000 mg by mouth 3 times daily (with meals)       allopurinol (ZYLOPRIM) 100 MG tablet Take 1 tablet by mouth daily 90 tablet 1    ticagrelor (BRILINTA) 90 MG TABS tablet Take 1 tablet by mouth 2 times daily 180 tablet 1    B Complex-C-Folic Acid (BONI CAPS) 1 MG CAPS Take 1 mg by mouth nightly       omeprazole (PRILOSEC) 20 MG delayed release capsule Take 20 mg by mouth daily as needed       LUMIGAN 0.01 % SOLN ophthalmic drops Place 1 drop into the right eye nightly       COMBIGAN 0.2-0.5 % ophthalmic solution Place 1 drop into the right eye every 12 hours   7     Allergies:    Furosemide    Social History:     reports that she has never smoked. She has never used smokeless tobacco. She reports that she does not drink alcohol and does not use drugs.     Family History:         Problem Relation Age of Onset    Breast Cancer Mother 61    Hypertension Mother     Heart Disease Father     Prostate Cancer Father     Breast Cancer Maternal Grandmother 61     Physical Exam:    Patient Vitals for the past 24 hrs:   BP Temp Temp src Pulse Resp SpO2 Weight   05/09/22 0804 (!) 107/40 -- -- 58 -- -- --   05/09/22 0734 (!) 111/53 -- -- 54 -- -- --   05/09/22 0718 (!) 103/51 -- -- 56 -- -- --   05/09/22 0710 (!) 107/54 97.4 °F (36.3 °C) -- 55 16 -- 171 lb 11.8 oz (77.9 kg)   05/09/22 0643 -- -- -- -- -- -- 173 lb 1 oz (78.5 kg)   05/08/22 2145 (!) 139/46 97.7 °F (36.5 °C) Axillary 58 16 100 % --   05/08/22 1517 (!) 129/40 -- -- 59 18 -- --       Intake/Output Summary (Last 24 hours) at 5/9/2022 0837  Last data filed at 5/8/2022 1429  Gross per 24 hour   Intake 0 ml   Output --   Net 0 ml     General: Awake, alert, no acute distress  Neck: No JVD noted  Lungs: Clear bilaterally upper, diminished to the bases bilaterally. Unlabored  CV: Regular rate and rhythm. No rub  Abd: Soft, nontender, nondistended. Active bowel sounds  Skin: Warm and dry. No rash on exposed extremities  Ext: 1+ RUE edema (h/o breast CA); No BLE edema ; left AV fistula   Neuro: Awake, answers questions appropriately    Data:    Recent Labs     05/07/22  0600 05/08/22  0600 05/09/22  0716   WBC 4.8 4.2* 3.9*   HGB 8.9* 9.0* 9.3*   HCT 29.7* 30.0* 30.5*   MCV 91.1 91.5 88.9    184 190     Recent Labs     05/07/22  0600 05/08/22  0600 05/09/22  0716    135 133   K 4.0 4.1 4.4   CL 96* 97* 94*   CO2 29 28 29   CREATININE 2.7* 3.7* 5.1*   BUN 9 17 28*   LABGLOM 21 15 10   GLUCOSE 55* 77 106*   CALCIUM 8.7 8.5* 9.1     Vit D, 25-Hydroxy   Date Value Ref Range Status   03/11/2022 28 (L) 30 - 100 ng/mL Final     Comment:     <20 ng/mL. ........... Clenton Reas Deficient  20-30 ng/mL. ......... Clenton Reas Insufficient   ng/mL. ........ Clenton Reas Sufficient  >100 ng/mL. .......... Clenton Reas Toxic       PTH   Date Value Ref Range Status   03/11/2022 241 (H) 15 - 65 pg/mL Final     Recent Labs 05/07/22  0600 05/08/22  0600 05/09/22  0716   ALT 11 11 16   AST 33* 27 29   ALKPHOS 130* 125* 142*   BILITOT 0.4 0.3 0.3     Recent Labs     05/07/22  0600 05/08/22  0600 05/09/22  0716   LABALBU 3.0* 2.8* 2.9*     Ferritin   Date Value Ref Range Status   11/09/2017 334 ng/mL Final     Comment:     FERRITIN Reference Ranges:  Adult Males   20 - 60 yrars:    30 - 400 ng/mL  Adult females 17 - 60 years:    13 - 150 ng/mL  Adults greater than 60 years:   no established reference range  Pediatrics:                     no established reference range       Iron   Date Value Ref Range Status   11/09/2017 33 (L) 37 - 145 mcg/dL Final     TIBC   Date Value Ref Range Status   11/09/2017 222 (L) 250 - 450 mcg/dL Final     Vitamin B-12   Date Value Ref Range Status   02/17/2017 1802 (H) 211 - 946 pg/mL Final     Folate   Date Value Ref Range Status   02/17/2017 >20.0 4.8 - 24.2 ng/mL Final     Lab Results   Component Value Date    COLORU Yellow 11/07/2017    NITRU Negative 11/07/2017    GLUCOSEU Negative 11/07/2017    GLUCOSEU NEGATIVE 08/27/2011    KETUA Negative 11/07/2017    UROBILINOGEN 0.2 11/07/2017    BILIRUBINUR Negative 11/07/2017    BILIRUBINUR NEGATIVE 08/27/2011     No results found for: ALVINO, CREURRAN, MACREATRATIO, OSMOU    No components found for: URIC    No results found for: LIPIDPAN    Assessment and Plans:    1. ESRD on HD  OP [facility] MWF 1st shift   left AV fistula  Continue HD MWF    2.  NSTEMI  Troponin 535 on 5/2--> 1388 on 5/3  On heparin drip  S/p cardiac catheterization with balloon angioplasty 5/5  Cardiology previously following    3. Back pain  S/p bone biopsy 4/18 for lumbar spine osteomyelitis  On vancomycin  Neurosurgery following    4. Hypertension  BP goal<140/90  BP at goal  Monitor on current regimen and with HD    5.   Anemia  Hemoglobin target 10-12  Hemoglobin 9.3 today  Continue JAYSHREE as hemoglobin below target  Transfuse for hemoglobin<7  Monitor H&H    6.  Secondary hyperparathyroidism of renal origin  PTH noted in the outpatient setting on 4/25/2022 was 443  Phosphorus 3.2 on 5/4  Continue on [address], APRN - CNP     Patient seen and examined all key components of the physical performed independently , case discussed with NP, all pertinent labs and radiologic tests personally reviewed agree with above. Estell Claude, MD     Department of Internal Medicine  Section of Nephrology  Dialysis Note        PROCEDURE:  Patient seen on hemodialysis     PHYSICIAN[facility]  [facility] Camacho M.D., [facility]    INDICATION:  End-stage renal disease    RX:  See dialysis flowsheet for specifics on access, blood flow rate, dialysate baths, duration of dialysis, anticoagulation and other technical information.     COMMENTS:  Procedure in progress and tolerated       Estell Claude, MD, MD

## 2022-05-09 NOTE — CARE COORDINATION
Care Coordination  Noted discharge order. The plan is for the patient to return to [facility]. I called for updated therapy evals so Rosalba Calderon can start the precert today. We will need a covid test on the day of discharge. The patient gets ESRD on HD OP [facility] MWF 1st shift  left AV fistula . Continue HD MWF.  The envelope is done and a passar was done,

## 2022-05-09 NOTE — PROGRESS NOTES
Occupational Therapy  OT SESSION ATTEMPT     Date:2022  Patient Name: Alissa Barrios  MRN: [id number]  : 1955  Room: [address] [place], [place] [address]     Attempted OT session this date:    [] unavailable due to other medical staff currently with pt   [] on hold per nursing staff   [] on hold per nursing staff secondary to lab / radiology results    [] declined Occupational Therapy  this date due to ___. Benefits of participation in therapy reviewed with pt. [x] off unit at dialysis    [] Other:     Will reattempt OT evaluation at a later time.     Danita Seip OTR/L #[id number]

## 2022-05-09 NOTE — PROGRESS NOTES
Pharmacy Consultation Note  (Antibiotic Dosing and Monitoring)    Initial consult date: 5/3/22  Consulting physician/provider: Dr. Gibran Tan  Drug: Vancomycin  Indication: Osteomyelitis     Age/  Gender Height Weight IBW  Allergy Information   67 y.o./female 5' 10\" (177.8 cm)  5' 10\" (177.8 cm) 163 lb (73.9 kg)     Ideal body weight: 68.5 kg (151 lb 0.2 oz)  Adjusted ideal body weight: 72.3 kg (159 lb 4.9 oz)   Furosemide      Renal Function:  Recent Labs     05/07/22  0600 05/08/22  0600 05/09/22  0716   BUN 9 17 28*   CREATININE 2.7* 3.7* 5.1*       Intake/Output Summary (Last 24 hours) at 5/9/2022 0912  Last data filed at 5/8/2022 1429  Gross per 24 hour   Intake 0 ml   Output --   Net 0 ml       Vancomycin Monitoring:  Trough:  No results for input(s): VANCOTROUGH in the last 72 hours. Random:    Recent Labs     05/08/22  0600   VANCORANDOM 23.5       Vancomycin Administration Times:    Date  SCr/CrCl       or HD Drug/Dose Time   Given Level(s)   (Time)   5/3 No HD -- -- 22.9   (0400)   5/4 HD x4 hrs Vancomycin 1000 mg IV x1 <1700> 18.8   (0710)   5/5 No HD -- -- 19.7   (0630)   5/6 HD x 4 hrs Vancomycin 1250 IV x 1 14:31 17.4  (0745)   5/7 No HD -- -- --   5/8 No HD -- -- 23.5  (0600)   5/9 HD x 4 hrs Vancomycin 1000 IV x 1 <1400> See yesterday     Assessment:  · Patient is a 78 y.o. female who has been continued on vancomycin for Hx osteomyelitis from last admission  · Receiving vancomycin 750 mg every MWF with dialysis x 6 weeks (End date 6/1/2022) per ID  Estimated Creatinine Clearance: 12 mL/min (A) (based on SCr of 5.1 mg/dL (H)). · To dose vancomycin, pharmacy will be utilizing dosing based off of levels due to patient requiring hemodialysis. Plan:  · HD today- Pre-HD level was 23.5  · Will redose post-HD 1000 mg IV vancomycin x1  · Will continue to monitor patient. · Clinical pharmacy to follow. Lorie Marin PharmD, [facility], BCPS 5/9/2022 9:12 AM

## 2022-05-09 NOTE — PROGRESS NOTES
Physical Therapy  Physical Therapy Initial Assessment     Name: Khushbu Franco  : 1955  MRN: [id number]      Date of Service: 2022    Evaluating PT:  Lucian Tavarez PT, DPT [id number]     Room #:  2614/2084-E  Diagnosis:  NSTEMI (non-ST elevated myocardial infarction) ([facility] [I21.4]  Reason for admission: chest and back pain  Precautions:  Falls, RLE pain and weakness, spine neutral for comfort   Procedure/Surgery:   heart cath  Equipment Recommendations:  TBD     SUBJECTIVE:  Pt lives alone in a 1 story home with level entry. Pt ambulated with FWW outside and either FWW or cane inside PTA. OBJECTIVE:   Initial Evaluation  Date:  Treatment   Short Term/ Long Term   Goals   AM-PAC 6 Clicks 82/67     Was pt agreeable to Eval/treatment? Yes      Does pt have pain?  Severe back and RLE pain     Bed Mobility  Rolling: Carlos Alberto  Supine to sit: ModA  Sit to supine: ModA  Scooting: ModA  SBA    Transfers Sit to stand: NT  Stand to sit: NT  Stand pivot: NT  -refused d/t pain  Carlos Alberto   Ambulation    NT    >15 feet with [employer]   Stair negotiation: ascended and descended  NT  TBD     LE ROM: WFL    LE Strength:   knee ext: 4-/5  ankle DF: 4-/5    Balance:   Sitting static: Carlos Alberto  Sitting dynamic: Carlos Alberto  Standing static: NT  Standing dynamic: NT    -Pt is A & O x 3  -Sensation:  Pt denies numbness and tingling to extremities  -Edema:  unremarkable     Therapeutic Exercises:  Functional activity     Patient education  Pt educated on safety, sequencing of transfers, and role of PT    Patient response to education:   Pt verbalized understanding Pt demonstrated skill Pt requires further education in this area   Yes  Partial  Yes      ASSESSMENT:  Conditions Requiring Skilled Therapeutic Intervention:  [x]Decreased strength     []Decreased ROM  [x]Decreased functional mobility  [x]Decreased balance   [x]Decreased endurance   []Decreased posture  []Decreased sensation  []Decreased coordination   []Decreased vision  [x]Decreased safety awareness   [x]Increased pain     Comments:  Pt received supine in bed and agreeable to PT session   Pt was educated on spinal neutral for comfort. She rolled well only needing light assist and guidance. Found on bedpan so hygiene tasks were completed. Assisted to sitting EOB. Sitting balance was fair but posterior leaning was noted and pt reported severe levels of pain. She was unable to tolerate sitting up to EOB long enough to attempt standing. Pt returned to supine to end session. Her LUE was noted to be bleeding from dialysis site. Placed new gauze over bleeding and informed RN's of bleeding. Pt with all needs met and call light in reach. Pt would benefit from continued PT POC to address functional deficits described above. Treatment:  Patient practiced and was instructed in the following treatment:     Therapeutic activity  o Patient education provided continuously throughout session for sequencing, safety maintenance, and improving any deficits found during the evaluation. o Bed mobility training - pt given verbal and tactile cues to facilitate proper sequencing and safety during rolling and supine>sit as well as provided with physical assistance to complete task    o Sitting EOB for >5 minutes for upright tolerance, postural awareness and BLE ROM     Pt's/ family goals   1. Regain strength    Patient and or family understand(s) diagnosis, prognosis, and plan of care. yes    Prognosis is fair for reaching above PT goals. PHYSICAL THERAPY PLAN OF CARE:    PT POC is established based on physician order and patient diagnosis     Referring provider/PT Order:  05/06/22 1045   PT eval and treat Start: 05/06/22 1045, End: 05/06/22 1045, ONE TIME, Standing Count: 1 Occurrences, Segundo Martel MD    Diagnosis:  NSTEMI (non-ST elevated myocardial infarction) [facility] [I21.4]  Specific instructions for next treatment:  Progress transfers and ambulate as able.      Current Treatment Recommendations:   [] Strengthening to improve independence with functional mobility   [] ROM to improve independence with functional mobility   [x] Balance Training to improve static/dynamic balance and to reduce fall risk  [x] Endurance Training to improve activity tolerance during functional mobility   [x] Transfer Training to improve safety and independence with all functional transfers   [x] Gait Training to improve gait mechanics, endurance and asses need for appropriate assistive device  [] Stair Training in preparation for safe discharge home and/or into the community   [x] Positioning to prevent skin breakdown and contractures  [x] Safety and Education Training   [] Patient/Caregiver Education   [] HEP  [] Other     PT long term treatment goals are located in above grid    Frequency of treatments: 2-5x/week x 1-2 weeks. Time in  1410  Time out  1430    Total Treatment Time  10 minutes     Evaluation Time includes thorough review of current medical information, gathering information on past medical history/social history and prior level of function, completion of standardized testing/informal observation of tasks, assessment of data and education on plan of care and goals.     CPT codes:  [x] Low Complexity PT evaluation 20650  [] Moderate Complexity PT evaluation 09887  [] High Complexity PT evaluation 77414  [] PT Re-evaluation 73437  [] Gait training 27414 - minutes  [] Manual therapy 56315 - minutes  [x] Therapeutic activities 93702 10 minutes  [] Therapeutic exercises 60760 - minutes  [] Neuromuscular reeducation 06423 - minutes     Deacon Hayes, PT, DPT  [id number]

## 2022-05-09 NOTE — PROGRESS NOTES
Difficulty with ambulation,  Radicular pain persists. Getting dialysis. Knows the opinion of Dr Jose Pabon. She wants to go to [facility] for another opinion  Nothing new to add from neurosurgical stand point at this time.

## 2022-05-09 NOTE — PROGRESS NOTES
Hospitalist Progress Note      PCP: Juanjo Rosario MD    Date of Admission: 5/2/2022    Hospital Course:   \" 78 y.o. female who past medical history that includes lumbar osteomyelitis, coronary artery disease, ESRD, anemia, hypertension, hyperlipidemia, diabetes, history of bladder carcinoma presented with back pain and abdominal pain for several weeks and is worsening over the past few days. She was recently admitted due to osteomyelitis. She was recently discharged to rehab  on vancomycin. In the ER, there was concern for STEMI on EKG. Interventional cardiology was consulted from the ER and they advised no acute STEMI on EKG however they will plan to do a PCI in the morning. She was placed on heparin drip. He was transferred to see Garland Goodman for continued work-up. ER also spoke with neurosurgeon, Dr. No Bowman for continued signs of osteomyelitis. In ER, routine labwork was performed which revealed creatinine 2.5, troponin 323->535. EKG revealed atrial fibrillationST elevation noted in leads aVR ST depressions noted in leads V4 through V6 as well as in leads I to, ST elevation in leads V1. Repeat EKG. The patient received heparin drip in the emergency room and was admitted under the care of [facility] physicians. \"       Subjective:    Complains of back pain. She is having bowel movement.     Medications:  Reviewed    Infusion Medications    sodium chloride      sodium chloride      dextrose       Scheduled Medications    vancomycin  1,000 mg IntraVENous Once    heparin (porcine)  5,000 Units SubCUTAneous BID    sodium chloride flush  5-40 mL IntraVENous 2 times per day    allopurinol  100 mg Oral Daily    atorvastatin  80 mg Oral Nightly    b complex-C-folic acid  1 mg Oral Nightly    bumetanide  2 mg Oral Once per day on Sun Tue Thu    gabapentin  200 mg Oral TID    insulin glargine-yfgn  5 Units SubCUTAneous Nightly    lanthanum  1,000 mg Oral TID WC    latanoprost  1 drop Right Eye Nightly    metoprolol succinate  25 mg Oral Daily    pantoprazole  40 mg Oral QAM AC    brimonidine  1 drop Right Eye BID    And    timolol  1 drop Right Eye BID    ticagrelor  90 mg Oral BID    vancomycin (VANCOCIN) intermittent dosing (placeholder)   Other RX Placeholder    aspirin  81 mg Oral Daily    epoetin fani-epbx  2,000 Units SubCUTAneous Q MWF    alteplase  2 mg IntraCATHeter Once     PRN Meds: sodium chloride flush, sodium chloride, acetaminophen, midodrine, sodium chloride, ondansetron **OR** ondansetron, polyethylene glycol, [DISCONTINUED] acetaminophen **OR** acetaminophen, lidocaine-prilocaine, oxyCODONE-acetaminophen, fentanNYL, glucose, dextrose, glucagon (rDNA), dextrose, heparin flush      Intake/Output Summary (Last 24 hours) at 5/9/2022 1324  Last data filed at 5/9/2022 1118  Gross per 24 hour   Intake 420 ml   Output 2000 ml   Net -1580 ml       Physical Exam Performed:    BP (!) 122/37   Pulse 77   Temp 97.5 °F (36.4 °C)   Resp 16   Ht 5' 10\" (1.778 m)   Wt 166 lb 14.2 oz (75.7 kg)   SpO2 100%   BMI 23.95 kg/m²     General appearance: No apparent distress, appears stated age and cooperative. HEENT: Pupils equal, round, and reactive to light. Conjunctivae/corneas clear. Neck: Supple, with full range of motion. R port-a-cath  Respiratory:  Normal respiratory effort. Clear to auscultation, bilaterally without Rales/Wheezes/Rhonchi. Cardiovascular: Regular rate and rhythm with normal S1/S2 without murmurs, rubs or gallops. Abdomen: Soft, non-tender, non-distended with normal bowel sounds. Musculoskeletal: No clubbing, cyanosis or edema bilaterally. Left UE AVF  Skin: Skin color, texture, turgor normal.  No rashes or lesions.   Neurologic: 4/5 strength in RLE,pt has pain on flexion  Psychiatric: Alert and oriented, thought content appropriate, normal insight      Labs:   Recent Labs     05/07/22  0600 05/08/22  0600 05/09/22  0716   WBC 4.8 4.2* 3.9*   HGB 8.9* 9.0* 9.3*   HCT 29.7* 30.0* 30.5*    184 190     Recent Labs     05/07/22  0600 05/08/22  0600 05/09/22  0716    135 133   K 4.0 4.1 4.4   CL 96* 97* 94*   CO2 29 28 29   BUN 9 17 28*   CREATININE 2.7* 3.7* 5.1*   CALCIUM 8.7 8.5* 9.1     Recent Labs     05/07/22  0600 05/08/22  0600 05/09/22  0716   AST 33* 27 29   ALT 11 11 16   BILITOT 0.4 0.3 0.3   ALKPHOS 130* 125* 142*     No results for input(s): INR in the last 72 hours. No results for input(s): Jocelyn Sammy in the last 72 hours. Urinalysis:      Lab Results   Component Value Date    NITRU Negative 11/07/2017    WBCUA 1-3 11/07/2017    WBCUA NONE 08/27/2011    BACTERIA MODERATE 11/07/2017    RBCUA 0-1 11/07/2017    RBCUA 1-3 02/13/2013    BLOODU Negative 11/07/2017    SPECGRAV 1.025 11/07/2017    GLUCOSEU Negative 11/07/2017    GLUCOSEU NEGATIVE 08/27/2011       Radiology:  CT LUMBAR SPINE WO CONTRAST   Final Result   No significant interval change. Again seen are endplate erosions at X8-0, highly suggestive of osteomyelitis. Erosions at L2-3 are more chronic appearing, but are also unchanged from   prior. Again seen are changes of posterior spinal fusion from L3-5. Grossly stable severe spinal stenosis at L2-3 and moderate spinal stenosis at   L1-2. High-grade neural foraminal stenoses at these levels are grossly   stable. Note that CT is inferior to MRI for the evaluation of spinal/neural   foraminal stenosis. MRI could also better evaluate the extent of   discitis-osteomyelitis. Assessment/Plan:    Active Hospital Problems    Diagnosis     Moderate protein-calorie malnutrition ([address] [E44.0]      Priority: Medium    NSTEMI (non-ST elevated myocardial infarction) ([facility] [address] [I21.4]      Priority: Medium       NSTEMI  -Cardiology consulted. Pt had cardiac cath on 5/5/22. S/p PCI for RCA in stent restenosis.   -Continue aspirin,brillinta,statin and metoprolol    Osteomyelitis of lumbar spine L1-L2   Hx lumbar fusion  -Neurosurgery consultation. Recommend that patient benefit from a lumbar decompression and fusion once medically stable. -Pt has now RLE pain and weakness. CT lumbar shows no change in previous findings of osteomyelitis. Dr Cathi Izaguirre, recommends no new changes. Plan remains as above.  -Pain control.    -Continue vancomycin with HD. Family wanted update from ID. I reviewed plan for 6 weeks of vanco and plan to follow up with Dr Josh Soliman. CAD  -As above    End-stage renal disease on hemodialysis- Monday Wednesday Friday,   [facility] nephrology. Continue dialysis. 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 Awaiting for precert.     Brittnee Fabian MD

## 2022-05-09 NOTE — FLOWSHEET NOTE
05/09/22 1118   Vital Signs   BP (!) 122/37   Temp 97.5 °F (36.4 °C)   Pulse 77   Weight 166 lb 14.2 oz (75.7 kg)   Percent Weight Change -2.82   Post-Hemodialysis Assessment   Post-Treatment Procedures Blood returned; Access bleeding time < 10 minutes   Machine Disinfection Process Exterior Machine Disinfection   Rinseback Volume (ml) 300 ml   Total Liters Processed (l/min) 88.8 l/min   Dialyzer Clearance Lightly streaked   Duration of Treatment (minutes) 240 minutes   Heparin amount administered during treatment (units) 0 units   Hemodialysis Intake (ml) 300 ml   Hemodialysis Output (ml) 2000 ml   NET Removed (ml) 1700 ml   Tolerated Treatment Good   Patient Response to Treatment tolerated well, blood returned, needles pulled sites held, stasis achieved, bandaids applied, +thrill, +bruit

## 2022-05-10 LAB
ALBUMIN SERPL-MCNC: 3.2 G/DL (ref 3.5–5.2)
ALP BLD-CCNC: 135 U/L (ref 35–104)
ALT SERPL-CCNC: 24 U/L (ref 0–32)
ANION GAP SERPL CALCULATED.3IONS-SCNC: 7 MMOL/L (ref 7–16)
AST SERPL-CCNC: 37 U/L (ref 0–31)
BILIRUB SERPL-MCNC: 0.3 MG/DL (ref 0–1.2)
BUN BLDV-MCNC: 19 MG/DL (ref 6–23)
CALCIUM SERPL-MCNC: 8.9 MG/DL (ref 8.6–10.2)
CHLORIDE BLD-SCNC: 96 MMOL/L (ref 98–107)
CO2: 31 MMOL/L (ref 22–29)
CREAT SERPL-MCNC: 3.5 MG/DL (ref 0.5–1)
GFR AFRICAN AMERICAN: 16
GFR NON-AFRICAN AMERICAN: 16 ML/MIN/1.73
GLUCOSE BLD-MCNC: 65 MG/DL (ref 74–99)
HCT VFR BLD CALC: 29.4 % (ref 34–48)
HEMOGLOBIN: 8.9 G/DL (ref 11.5–15.5)
MCH RBC QN AUTO: 27 PG (ref 26–35)
MCHC RBC AUTO-ENTMCNC: 30.3 % (ref 32–34.5)
MCV RBC AUTO: 89.1 FL (ref 80–99.9)
METER GLUCOSE: 76 MG/DL (ref 74–99)
PDW BLD-RTO: 17.7 FL (ref 11.5–15)
PLATELET # BLD: 190 E9/L (ref 130–450)
PMV BLD AUTO: 10.8 FL (ref 7–12)
POTASSIUM SERPL-SCNC: 4.1 MMOL/L (ref 3.5–5)
RBC # BLD: 3.3 E12/L (ref 3.5–5.5)
SODIUM BLD-SCNC: 134 MMOL/L (ref 132–146)
TOTAL PROTEIN: 5.7 G/DL (ref 6.4–8.3)
WBC # BLD: 4.4 E9/L (ref 4.5–11.5)

## 2022-05-10 PROCEDURE — 85027 COMPLETE CBC AUTOMATED: CPT

## 2022-05-10 PROCEDURE — 82962 GLUCOSE BLOOD TEST: CPT

## 2022-05-10 PROCEDURE — 6360000002 HC RX W HCPCS: Performed by: INTERNAL MEDICINE

## 2022-05-10 PROCEDURE — 80053 COMPREHEN METABOLIC PANEL: CPT

## 2022-05-10 PROCEDURE — 6370000000 HC RX 637 (ALT 250 FOR IP): Performed by: INTERNAL MEDICINE

## 2022-05-10 PROCEDURE — 2140000000 HC CCU INTERMEDIATE R&B

## 2022-05-10 PROCEDURE — 2580000003 HC RX 258: Performed by: INTERNAL MEDICINE

## 2022-05-10 PROCEDURE — 36415 COLL VENOUS BLD VENIPUNCTURE: CPT

## 2022-05-10 RX ADMIN — SODIUM CHLORIDE, PRESERVATIVE FREE 10 ML: 5 INJECTION INTRAVENOUS at 08:24

## 2022-05-10 RX ADMIN — TICAGRELOR 90 MG: 90 TABLET ORAL at 22:12

## 2022-05-10 RX ADMIN — HEPARIN SODIUM 5000 UNITS: 10000 INJECTION INTRAVENOUS; SUBCUTANEOUS at 08:38

## 2022-05-10 RX ADMIN — LATANOPROST 1 DROP: 50 SOLUTION OPHTHALMIC at 22:17

## 2022-05-10 RX ADMIN — LANTHANUM CARBONATE 1000 MG: 500 TABLET, CHEWABLE ORAL at 17:31

## 2022-05-10 RX ADMIN — PANTOPRAZOLE SODIUM 40 MG: 40 TABLET, DELAYED RELEASE ORAL at 05:44

## 2022-05-10 RX ADMIN — NEPHROCAP 1 MG: 1 CAP ORAL at 22:12

## 2022-05-10 RX ADMIN — LANTHANUM CARBONATE 1000 MG: 500 TABLET, CHEWABLE ORAL at 12:48

## 2022-05-10 RX ADMIN — HEPARIN SODIUM 5000 UNITS: 10000 INJECTION INTRAVENOUS; SUBCUTANEOUS at 22:13

## 2022-05-10 RX ADMIN — METOPROLOL SUCCINATE 25 MG: 25 TABLET, EXTENDED RELEASE ORAL at 08:37

## 2022-05-10 RX ADMIN — OXYCODONE AND ACETAMINOPHEN 1 TABLET: 5; 325 TABLET ORAL at 05:44

## 2022-05-10 RX ADMIN — ASPIRIN 81 MG: 81 TABLET, COATED ORAL at 08:37

## 2022-05-10 RX ADMIN — TICAGRELOR 90 MG: 90 TABLET ORAL at 08:38

## 2022-05-10 RX ADMIN — ATORVASTATIN CALCIUM 80 MG: 80 TABLET, FILM COATED ORAL at 22:12

## 2022-05-10 RX ADMIN — BRIMONIDINE TARTRATE 1 DROP: 2 SOLUTION OPHTHALMIC at 22:14

## 2022-05-10 RX ADMIN — TIMOLOL MALEATE 1 DROP: 5 SOLUTION OPHTHALMIC at 22:14

## 2022-05-10 RX ADMIN — Medication 100 UNITS: at 22:13

## 2022-05-10 RX ADMIN — ALLOPURINOL 100 MG: 100 TABLET ORAL at 08:37

## 2022-05-10 RX ADMIN — OXYCODONE AND ACETAMINOPHEN 1 TABLET: 5; 325 TABLET ORAL at 22:17

## 2022-05-10 RX ADMIN — GABAPENTIN 200 MG: 100 CAPSULE ORAL at 15:26

## 2022-05-10 RX ADMIN — SODIUM CHLORIDE, PRESERVATIVE FREE 10 ML: 5 INJECTION INTRAVENOUS at 22:13

## 2022-05-10 RX ADMIN — LANTHANUM CARBONATE 1000 MG: 500 TABLET, CHEWABLE ORAL at 08:23

## 2022-05-10 RX ADMIN — GABAPENTIN 200 MG: 100 CAPSULE ORAL at 08:36

## 2022-05-10 RX ADMIN — GABAPENTIN 200 MG: 100 CAPSULE ORAL at 22:12

## 2022-05-10 RX ADMIN — BUMETANIDE 2 MG: 1 TABLET ORAL at 08:37

## 2022-05-10 ASSESSMENT — PAIN SCALES - GENERAL
PAINLEVEL_OUTOF10: 7
PAINLEVEL_OUTOF10: 0
PAINLEVEL_OUTOF10: 5

## 2022-05-10 ASSESSMENT — PAIN DESCRIPTION - PAIN TYPE
TYPE: CHRONIC PAIN
TYPE: CHRONIC PAIN

## 2022-05-10 ASSESSMENT — PAIN DESCRIPTION - ONSET
ONSET: ON-GOING
ONSET: ON-GOING

## 2022-05-10 ASSESSMENT — PAIN DESCRIPTION - DESCRIPTORS
DESCRIPTORS: ACHING
DESCRIPTORS: ACHING

## 2022-05-10 ASSESSMENT — PAIN DESCRIPTION - ORIENTATION
ORIENTATION: RIGHT;LOWER
ORIENTATION: RIGHT

## 2022-05-10 ASSESSMENT — PAIN DESCRIPTION - FREQUENCY
FREQUENCY: CONTINUOUS
FREQUENCY: CONTINUOUS

## 2022-05-10 ASSESSMENT — PAIN DESCRIPTION - LOCATION
LOCATION: BACK;LEG
LOCATION: BACK;LEG

## 2022-05-10 NOTE — CARE COORDINATION
Care Coordination  The patient has been medically stable x 2 days and per Severa Shahid was not started yesterday 5/9/22. It was started this morning. Await the precert to come back for the patient can go to [facility] skilled as the patient is medically stable.  The envelope is done and a passar was done,

## 2022-05-10 NOTE — PROGRESS NOTES
Hospitalist Progress Note      PCP: Donal Llanes MD    Date of Admission: 5/2/2022    Hospital Course:   \" 78 y.o. female who past medical history that includes lumbar osteomyelitis, coronary artery disease, ESRD, anemia, hypertension, hyperlipidemia, diabetes, history of bladder carcinoma presented with back pain and abdominal pain for several weeks and is worsening over the past few days. She was recently admitted due to osteomyelitis. She was recently discharged to rehab  on vancomycin. In the ER, there was concern for STEMI on EKG. Interventional cardiology was consulted from the ER and they advised no acute STEMI on EKG however they will plan to do a PCI in the morning. She was placed on heparin drip. He was transferred to see Noe Meraz for continued work-up. ER also spoke with neurosurgeon, Dr. Leon Mcgregor for continued signs of osteomyelitis. In ER, routine labwork was performed which revealed creatinine 2.5, troponin 323->535. EKG revealed atrial fibrillationST elevation noted in leads aVR ST depressions noted in leads V4 through V6 as well as in leads I to, ST elevation in leads V1. Repeat EKG. The patient received heparin drip in the emergency room and was admitted under the care of [facility] physicians. \"       Subjective:    Patient had persistent lower back pain. She has weakness in her legs.     Medications:  Reviewed    Infusion Medications    sodium chloride      sodium chloride      dextrose       Scheduled Medications    heparin (porcine)  5,000 Units SubCUTAneous BID    sodium chloride flush  5-40 mL IntraVENous 2 times per day    allopurinol  100 mg Oral Daily    atorvastatin  80 mg Oral Nightly    b complex-C-folic acid  1 mg Oral Nightly    bumetanide  2 mg Oral Once per day on Sun Tue Thu    gabapentin  200 mg Oral TID    insulin glargine-yfgn  5 Units SubCUTAneous Nightly    lanthanum  1,000 mg Oral TID     latanoprost  1 drop Right Eye Nightly    metoprolol succinate 25 mg Oral Daily    pantoprazole  40 mg Oral QAM AC    brimonidine  1 drop Right Eye BID    And    timolol  1 drop Right Eye BID    ticagrelor  90 mg Oral BID    vancomycin (VANCOCIN) intermittent dosing (placeholder)   Other RX Placeholder    aspirin  81 mg Oral Daily    epoetin fani-epbx  2,000 Units SubCUTAneous Q MWF    alteplase  2 mg IntraCATHeter Once     PRN Meds: sodium chloride flush, sodium chloride, acetaminophen, midodrine, sodium chloride, ondansetron **OR** ondansetron, polyethylene glycol, [DISCONTINUED] acetaminophen **OR** acetaminophen, lidocaine-prilocaine, oxyCODONE-acetaminophen, fentanNYL, glucose, dextrose, glucagon (rDNA), dextrose, heparin flush      Intake/Output Summary (Last 24 hours) at 5/10/2022 1032  Last data filed at 5/10/2022 0603  Gross per 24 hour   Intake 300 ml   Output 2000 ml   Net -1700 ml       Physical Exam Performed:    BP (!) 144/52   Pulse 58   Temp 98.1 °F (36.7 °C) (Oral)   Resp 16   Ht 5' 10\" (1.778 m)   Wt 167 lb 14.4 oz (76.2 kg)   SpO2 100%   BMI 24.09 kg/m²     General appearance: No apparent distress, appears stated age and cooperative. HEENT: Pupils equal, round, and reactive to light. Conjunctivae/corneas clear. Neck: Supple, with full range of motion. R port-a-cath  Respiratory:  Normal respiratory effort. Clear to auscultation, bilaterally without Rales/Wheezes/Rhonchi. Cardiovascular: Regular rate and rhythm with normal S1/S2 without murmurs, rubs or gallops. Abdomen: Soft, non-tender, non-distended with normal bowel sounds. Musculoskeletal: No clubbing, cyanosis or edema bilaterally. Left UE AVF  Skin: Skin color, texture, turgor normal.  No rashes or lesions.   Neurologic: 4/5 strength in RLE,pt has pain on flexion  Psychiatric: Alert and oriented, thought content appropriate, normal insight      Labs:   Recent Labs     05/08/22  0600 05/09/22  0716 05/10/22  0600   WBC 4.2* 3.9* 4.4*   HGB 9.0* 9.3* 8.9*   HCT 30.0* 30.5* 29.4*   PLT 184 190 190     Recent Labs     05/08/22  0600 05/09/22  0716 05/10/22  0600    133 134   K 4.1 4.4 4.1   CL 97* 94* 96*   CO2 28 29 31*   BUN 17 28* 19   CREATININE 3.7* 5.1* 3.5*   CALCIUM 8.5* 9.1 8.9     Recent Labs     05/08/22  0600 05/09/22  0716 05/10/22  0600   AST 27 29 37*   ALT 11 16 24   BILITOT 0.3 0.3 0.3   ALKPHOS 125* 142* 135*     No results for input(s): INR in the last 72 hours. No results for input(s): Kathya Basques in the last 72 hours. Urinalysis:      Lab Results   Component Value Date    NITRU Negative 11/07/2017    WBCUA 1-3 11/07/2017    WBCUA NONE 08/27/2011    BACTERIA MODERATE 11/07/2017    RBCUA 0-1 11/07/2017    RBCUA 1-3 02/13/2013    BLOODU Negative 11/07/2017    SPECGRAV 1.025 11/07/2017    GLUCOSEU Negative 11/07/2017    GLUCOSEU NEGATIVE 08/27/2011       Radiology:  CT LUMBAR SPINE WO CONTRAST   Final Result   No significant interval change. Again seen are endplate erosions at I4-6, highly suggestive of osteomyelitis. Erosions at L2-3 are more chronic appearing, but are also unchanged from   prior. Again seen are changes of posterior spinal fusion from L3-5. Grossly stable severe spinal stenosis at L2-3 and moderate spinal stenosis at   L1-2. High-grade neural foraminal stenoses at these levels are grossly   stable. Note that CT is inferior to MRI for the evaluation of spinal/neural   foraminal stenosis. MRI could also better evaluate the extent of   discitis-osteomyelitis. Assessment/Plan:    Active Hospital Problems    Diagnosis     Moderate protein-calorie malnutrition ([address] [E44.0]      Priority: Medium    NSTEMI (non-ST elevated myocardial infarction) ([address] [I21.4]      Priority: Medium       NSTEMI  -Cardiology consulted. Pt had cardiac cath on 5/5/22. S/p PCI for RCA in stent restenosis.   -Continue aspirin,brillinta,statin and metoprolol    Osteomyelitis of lumbar spine L1-L2   Hx lumbar fusion  -Neurosurgery consultation. Recommend that patient benefit from a lumbar decompression and fusion once medically stable. -Pt has now RLE pain and weakness. CT lumbar shows no change in previous findings of osteomyelitis. Dr Corinne Raja, recommends no new changes. Plan remains as above. Pt wants to go to [facility] [employer] clinic for second opinion.  -Pain control.    -Continue vancomycin with HD. Family wanted update from ID. I reviewed plan for 6 weeks of vanco and plan to follow up with Dr Jeremiah Mendoza. CAD  -As above    End-stage renal disease on hemodialysis- Monday Wednesday Friday,   [facility] nephrology. Continue dialysis. 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 Awaiting for precert.     Florentin Beckford MD

## 2022-05-10 NOTE — PROGRESS NOTES
[facility]  Nephrology Progress Note    Patient's Name: Leota Blizzard      History of Present Illness from 5/3 consult note:    Tiara Lilly is a 78 y.o. female with a past medical history of breast cancer, coronary artery disease, hypertension, hyperlipidemia, and anemia. She presented to the [address] [place] [address] ED on 5/2 with complaints of back and abdominal pain patient was subsequently transferred to [facility] for NSTEMI. Vital signs today include temperature 97.5, respirations 15, pulse 61, BP 98/42, and she was 94% on room air. Lab data from 5/2 include creatinine 2.5, troponin 535, and hemoglobin 9.1. Cardiology is consulted to see the patient. We were consulted to see the patient for dialysis. Patient is known to our service and dialyzes at [address] MWF 1st shift left AV fistula. At present, patient was seen and examined. She reports that she feels \"fair. \"  She reports right hip pain today. She reports that her appetite was fair before coming in to the hospital.  She denied any chest pain or shortness of breath. She denies any nausea, vomiting, or diarrhea. \"    Subjective:    5/10: Patient was seen and examined. She reports that she feels \"fair. \"  She reports some abdominal pain, but that her appetite is good. Denies any chest pain or shortness of breath. Denies any nausea. PMH:    Past Medical History:   Diagnosis Date    Acute infection of bone ([address]     infection of rt foot, resolved.     Acute osteomyelitis of phalanx of left hand ([address] 1/27/2022    Left third distal phalanx    Anemia of chronic disease     Arthritis     Breast cancer ([address]     right breast, 2008/ bladder, 2006- last chemotherapy \"years ago\"    CAD (coronary artery disease)     Carpal tunnel syndrome     bilat - for OR left hand 3-17-20     Chronic diastolic CHF (congestive heart failure) ([address] 09/23/2014 9/23/14- echocardiogram revealed moderate LV concentric hypertrophy, stage III diastolic dysfunction, mild tricuspid regurgitation    CKD (chronic kidney disease) stage 4, GFR 15-29 ml/min (HCC)     Diabetic retinopathy (HCC)     Glaucoma     Hemodialysis patient ([address]     [facility] fri- Dr. Vera Field - left arm fistula     Hyperkalemia, diminished renal excretion 11/9/2017    Hyperlipidemia     Hypertension     Hypoglycemia unawareness in type 1 diabetes mellitus ([address] 11/7/2017    Insulin dependent type 2 diabetes mellitus ([address]     Neuropathy     feet    Osteomyelitis due to secondary diabetes ([address]     rt great toe with amputation    Patient is Nondenominational 11/7/2017    Refusal of blood product     patient states she dose not take blood transfusion    Ventricular hypertrophy     Vitreous hemorrhage (HCC)     left eye     Patient Active Problem List   Diagnosis    Diabetic retinopathy ([address]    Malignant neoplasm of right female breast ([address]    Atherosclerosis of native coronary artery of native heart without angina pectoris    Moderate obesity    Left ventricular hypertrophy    Herniated lumbar intervertebral disc    Lumbar degenerative disc disease    Pseudomeningocele    Lumbar radiculopathy    Lymphedema of arm    CKD (chronic kidney disease) stage 4, GFR 15-29 ml/min (HCC)    Insulin dependent type 2 diabetes mellitus (HCC)    Anemia of chronic disease    Chronic diastolic CHF (congestive heart failure) (HCC)    Neuropathy    Hypertension    Glaucoma    Refusal of blood product    Pancytopenia ([address]    Controlled type 2 diabetes mellitus with chronic kidney disease on chronic dialysis, with long-term current use of insulin (HCC)    Vitreous hemorrhage ([address]    Patient is Nondenominational    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 ([address]    Hyperkalemia, diminished renal excretion    Chronic pain syndrome    Lumbar post-laminectomy syndrome    Myalgia    Cervicalgia    Diabetic peripheral neuropathy [facility]    ESRD (end stage renal disease) ([facility]    Bilateral carpal tunnel syndrome    Spinal stenosis of lumbar region with neurogenic claudication    Cardiac arrest ([facility]    ESRD (end stage renal disease) on dialysis ([facility]    Mixed hyperlipidemia    Lymphedema of right upper extremity    Coronary artery disease involving native coronary artery of native heart with angina pectoris (HCC)    Ventricular tachycardia (HCC)    Mitral valve disease    CAD in native artery    Lumbar stenosis without neurogenic claudication    Lumbar foraminal stenosis    Back pain    Intractable low back pain    Unable to ambulate    NSTEMI (non-ST elevated myocardial infarction) ([facility]    Moderate protein-calorie malnutrition (HCC)     Meds:     heparin (porcine)  5,000 Units SubCUTAneous BID    sodium chloride flush  5-40 mL IntraVENous 2 times per day    allopurinol  100 mg Oral Daily    atorvastatin  80 mg Oral Nightly    b complex-C-folic acid  1 mg Oral Nightly    bumetanide  2 mg Oral Once per day on Sun Tue Thu    gabapentin  200 mg Oral TID    insulin glargine-yfgn  5 Units SubCUTAneous Nightly    lanthanum  1,000 mg Oral TID WC    latanoprost  1 drop Right Eye Nightly    metoprolol succinate  25 mg Oral Daily    pantoprazole  40 mg Oral QAM AC    brimonidine  1 drop Right Eye BID    And    timolol  1 drop Right Eye BID    ticagrelor  90 mg Oral BID    vancomycin (VANCOCIN) intermittent dosing (placeholder)   Other RX Placeholder    aspirin  81 mg Oral Daily    epoetin fani-epbx  2,000 Units SubCUTAneous Q MWF    alteplase  2 mg IntraCATHeter Once      sodium chloride      sodium chloride      dextrose       Meds prn:     sodium chloride flush, sodium chloride, acetaminophen, midodrine, sodium chloride, ondansetron **OR** ondansetron, polyethylene glycol, [DISCONTINUED] acetaminophen **OR** acetaminophen, lidocaine-prilocaine, oxyCODONE-acetaminophen, fentanNYL, glucose, dextrose, glucagon (rDNA), dextrose, heparin flush    Meds prior to admission:     No current facility-administered medications on file prior to encounter. Current Outpatient Medications on File Prior to Encounter   Medication Sig Dispense Refill    vancomycin (VANCOCIN) infusion Infuse 750 mg intravenously three times a week Compound per protocol. 9 g 1    midodrine (PROAMATINE) 5 MG tablet Take 5 mg by mouth daily as needed      gabapentin (NEURONTIN) 100 MG capsule Take 2 capsules by mouth 3 times daily for 180 days. 180 capsule 5    atorvastatin (LIPITOR) 80 MG tablet Take 80 mg by mouth nightly      bumetanide (BUMEX) 2 MG tablet Take 2 mg by mouth three times a week Given Sunday,Tuesday,Thursday      isosorbide mononitrate (IMDUR) 30 MG extended release tablet Take 30 mg by mouth daily      insulin glargine (LANTUS) 100 UNIT/ML injection vial Inject 5 Units into the skin nightly       lidocaine-prilocaine (EMLA) 2.5-2.5 % cream Apply topically as needed for Pain       metoprolol succinate (TOPROL XL) 25 MG extended release tablet Take 1 tablet by mouth daily 90 tablet 1    lanthanum (FOSRENOL) 1000 MG chewable tablet Take 1,000 mg by mouth 3 times daily (with meals)       allopurinol (ZYLOPRIM) 100 MG tablet Take 1 tablet by mouth daily 90 tablet 1    ticagrelor (BRILINTA) 90 MG TABS tablet Take 1 tablet by mouth 2 times daily 180 tablet 1    B Complex-C-Folic Acid (BONI CAPS) 1 MG CAPS Take 1 mg by mouth nightly       omeprazole (PRILOSEC) 20 MG delayed release capsule Take 20 mg by mouth daily as needed       LUMIGAN 0.01 % SOLN ophthalmic drops Place 1 drop into the right eye nightly       COMBIGAN 0.2-0.5 % ophthalmic solution Place 1 drop into the right eye every 12 hours   7     Allergies:    Furosemide    Social History:     reports that she has never smoked. She has never used smokeless tobacco. She reports that she does not drink alcohol and does not use drugs.     Family History: Problem Relation Age of Onset    Breast Cancer Mother 61    Hypertension Mother     Heart Disease Father     Prostate Cancer Father     Breast Cancer Maternal Grandmother 61     Physical Exam:    Patient Vitals for the past 24 hrs:   BP Temp Temp src Pulse Resp Weight   05/10/22 0815 (!) 144/52 -- -- 58 16 --   05/10/22 0603 -- -- -- -- -- 167 lb 14.4 oz (76.2 kg)   05/09/22 2109 136/89 98.1 °F (36.7 °C) Oral 61 16 --   05/09/22 1445 (!) 102/36 -- -- 80 16 --   05/09/22 1330 (!) 110/34 -- -- 78 16 --   05/09/22 1118 (!) 122/37 97.5 °F (36.4 °C) -- 78 -- 166 lb 14.2 oz (75.7 kg)   05/09/22 1104 (!) 128/44 -- -- 58 -- --   05/09/22 1034 (!) 126/37 -- -- 57 -- --   05/09/22 1004 (!) 116/54 -- -- 58 -- --   05/09/22 0934 (!) 103/40 -- -- 55 -- --   05/09/22 0904 (!) 106/40 -- -- 56 -- --   05/09/22 0834 (!) 112/46 -- -- 57 -- --       Intake/Output Summary (Last 24 hours) at 5/10/2022 0822  Last data filed at 5/10/2022 0603  Gross per 24 hour   Intake 420 ml   Output 2000 ml   Net -1580 ml     General: Awake, alert, no acute distress  Neck: No JVD noted  Lungs: Clear bilaterally upper, diminished to the bases bilaterally. Unlabored  CV: Regular rate and rhythm. No rub  Abd: Soft, nontender, nondistended. Active bowel sounds  Skin: Warm and dry. No rash on exposed extremities  Ext: 1+ RUE edema (h/o breast CA);  No BLE edema ; left AV fistula   Neuro: Awake, answers questions appropriately    Data:    Recent Labs     05/08/22  0600 05/09/22  0716 05/10/22  0600   WBC 4.2* 3.9* 4.4*   HGB 9.0* 9.3* 8.9*   HCT 30.0* 30.5* 29.4*   MCV 91.5 88.9 89.1    190 190     Recent Labs     05/08/22  0600 05/09/22  0716 05/10/22  0600    133 134   K 4.1 4.4 4.1   CL 97* 94* 96*   CO2 28 29 31*   CREATININE 3.7* 5.1* 3.5*   BUN 17 28* 19   LABGLOM 15 10 16   GLUCOSE 77 106* 65*   CALCIUM 8.5* 9.1 8.9     Vit D, 25-Hydroxy   Date Value Ref Range Status   03/11/2022 28 (L) 30 - 100 ng/mL Final     Comment:     <20 ng/mL............ Benedetta Ou Deficient  20-30 ng/mL. ......... Benedetta Ou Insufficient   ng/mL. ........ Benedetta Ou Sufficient  >100 ng/mL. .......... Benedetta Ou Toxic       PTH   Date Value Ref Range Status   03/11/2022 241 (H) 15 - 65 pg/mL Final     Recent Labs     05/08/22  0600 05/09/22  0716 05/10/22  0600   ALT 11 16 24   AST 27 29 37*   ALKPHOS 125* 142* 135*   BILITOT 0.3 0.3 0.3     Recent Labs     05/08/22  0600 05/09/22  0716 05/10/22  0600   LABALBU 2.8* 2.9* 3.2*     Ferritin   Date Value Ref Range Status   11/09/2017 334 ng/mL Final     Comment:     FERRITIN Reference Ranges:  Adult Males   20 - 60 yrars:    30 - 400 ng/mL  Adult females 17 - 60 years:    13 - 150 ng/mL  Adults greater than 60 years:   no established reference range  Pediatrics:                     no established reference range       Iron   Date Value Ref Range Status   11/09/2017 33 (L) 37 - 145 mcg/dL Final     TIBC   Date Value Ref Range Status   11/09/2017 222 (L) 250 - 450 mcg/dL Final     Vitamin B-12   Date Value Ref Range Status   02/17/2017 1802 (H) 211 - 946 pg/mL Final     Folate   Date Value Ref Range Status   02/17/2017 >20.0 4.8 - 24.2 ng/mL Final     Lab Results   Component Value Date    COLORU Yellow 11/07/2017    NITRU Negative 11/07/2017    GLUCOSEU Negative 11/07/2017    GLUCOSEU NEGATIVE 08/27/2011    KETUA Negative 11/07/2017    UROBILINOGEN 0.2 11/07/2017    BILIRUBINUR Negative 11/07/2017    BILIRUBINUR NEGATIVE 08/27/2011     No results found for: ALVINO, CREURRAN, MACREATRATIO, OSMOU    No components found for: URIC    No results found for: LIPIDPAN    Assessment and Plans:    1. ESRD on HD  [facility]   left AV fistula  Continue HD MWF    2.  NSTEMI  Troponin 535 on 5/2--> 1388 on 5/3  On heparin drip  S/p cardiac catheterization with balloon angioplasty 5/5  Cardiology previously following    3. Back pain  S/p bone biopsy 4/18 for lumbar spine osteomyelitis  On vancomycin  Neurosurgery following    4.   Hypertension  BP goal<140/90  BP at goal  Monitor on current regimen and with HD    5. Anemia  Hemoglobin target 10-12  Hemoglobin 8.9 today  Continue JAYSHREE as hemoglobin below target  Transfuse for hemoglobin<7  Monitor H&H    6.  Secondary hyperparathyroidism of renal origin  PTH noted in the outpatient setting on 4/25/2022 was 443  Phosphorus 3.2 on 5/4  Continue on [address], APRN - CNP     Patient seen and examined all key components of the physical performed independently , case discussed with NP, all pertinent labs and radiologic tests personally reviewed agree with above. Bay Davis MD      Department of Internal Medicine  Section of Nephrology  Dialysis Note        PROCEDURE:  Patient seen on hemodialysis  PHYSICIAN:  Jacque Mchugh M.D., [facility]    INDICATION:  End-stage renal disease    RX:  See dialysis flowsheet for specifics on access, blood flow rate, dialysate baths, duration of dialysis, anticoagulation and other technical information.     COMMENTS:  Procedure in progress and tolerated       Bay Davis MD, MD

## 2022-05-10 NOTE — PROGRESS NOTES
Pharmacy Consultation Note  (Antibiotic Dosing and Monitoring)    Initial consult date: 5/3/22  Consulting physician/provider: Dr. Mayito Quiles  Drug: Vancomycin  Indication: Osteomyelitis     Age/  Gender Height Weight IBW  Allergy Information   67 y.o./female 5' 10\" (177.8 cm)  5' 10\" (177.8 cm) 163 lb (73.9 kg)     Ideal body weight: 68.5 kg (151 lb 0.2 oz)  Adjusted ideal body weight: 71.6 kg (157 lb 12.3 oz)   Furosemide      Renal Function:  Recent Labs     05/08/22  0600 05/09/22  0716 05/10/22  0600   BUN 17 28* 19   CREATININE 3.7* 5.1* 3.5*       Intake/Output Summary (Last 24 hours) at 5/10/2022 0815  Last data filed at 5/10/2022 0603  Gross per 24 hour   Intake 420 ml   Output 2000 ml   Net -1580 ml       Vancomycin Monitoring:  Trough:  No results for input(s): VANCOTROUGH in the last 72 hours. Random:    Recent Labs     05/08/22  0600   VANCORANDOM 23.5       Vancomycin Administration Times:    Date  SCr/CrCl       or HD Drug/Dose Time   Given Level(s)   (Time)   5/3 No HD -- -- 22.9   (0400)   5/4 HD x4 hrs Vancomycin 1000 mg IV x1 <1700> 18.8   (0710)   5/5 No HD -- -- 19.7   (0630)   5/6 HD x 4 hrs Vancomycin 1250 IV x 1 14:31 17.4  (0745)   5/7 No HD -- -- --   5/8 No HD -- -- 23.5  (0600)   5/9 HD x 4 hrs Vancomycin 1000 IV x 1 1400 See yesterday   5/10 No HD -- -- --     Assessment:  · Patient is a 78 y.o. female who has been continued on vancomycin for Hx osteomyelitis from last admission  · Receiving vancomycin 750 mg every MWF with dialysis x 6 weeks (End date 6/1/2022) per ID  Estimated Creatinine Clearance: 17 mL/min (A) (based on SCr of 3.5 mg/dL (H)). · To dose vancomycin, pharmacy will be utilizing dosing based off of levels due to patient requiring hemodialysis. Plan:  · No HD today. · Will order pre-HD level for tomorrow AM.  · Will continue to monitor patient. · Clinical pharmacy to follow. Lorie Mckinney, PharmD, [facility], BCPS 5/10/2022 8:15 AM

## 2022-05-11 VITALS
TEMPERATURE: 96.8 F | HEIGHT: 70 IN | DIASTOLIC BLOOD PRESSURE: 45 MMHG | SYSTOLIC BLOOD PRESSURE: 115 MMHG | WEIGHT: 175.04 LBS | OXYGEN SATURATION: 97 % | HEART RATE: 65 BPM | RESPIRATION RATE: 16 BRPM | BODY MASS INDEX: 25.06 KG/M2

## 2022-05-11 LAB
ALBUMIN SERPL-MCNC: 3.1 G/DL (ref 3.5–5.2)
ALP BLD-CCNC: 134 U/L (ref 35–104)
ALT SERPL-CCNC: 23 U/L (ref 0–32)
ANION GAP SERPL CALCULATED.3IONS-SCNC: 9 MMOL/L (ref 7–16)
AST SERPL-CCNC: 32 U/L (ref 0–31)
BILIRUB SERPL-MCNC: 0.3 MG/DL (ref 0–1.2)
BUN BLDV-MCNC: 26 MG/DL (ref 6–23)
CALCIUM SERPL-MCNC: 9.3 MG/DL (ref 8.6–10.2)
CHLORIDE BLD-SCNC: 95 MMOL/L (ref 98–107)
CO2: 28 MMOL/L (ref 22–29)
CREAT SERPL-MCNC: 4.4 MG/DL (ref 0.5–1)
GFR AFRICAN AMERICAN: 12
GFR NON-AFRICAN AMERICAN: 12 ML/MIN/1.73
GLUCOSE BLD-MCNC: 96 MG/DL (ref 74–99)
HCT VFR BLD CALC: 30.5 % (ref 34–48)
HEMOGLOBIN: 9.4 G/DL (ref 11.5–15.5)
MCH RBC QN AUTO: 27.6 PG (ref 26–35)
MCHC RBC AUTO-ENTMCNC: 30.8 % (ref 32–34.5)
MCV RBC AUTO: 89.7 FL (ref 80–99.9)
METER GLUCOSE: 101 MG/DL (ref 74–99)
METER GLUCOSE: 121 MG/DL (ref 74–99)
METER GLUCOSE: 161 MG/DL (ref 74–99)
METER GLUCOSE: 226 MG/DL (ref 74–99)
METER GLUCOSE: 232 MG/DL (ref 74–99)
PDW BLD-RTO: 17.7 FL (ref 11.5–15)
PLATELET # BLD: 205 E9/L (ref 130–450)
PMV BLD AUTO: 11.2 FL (ref 7–12)
POTASSIUM SERPL-SCNC: 4.5 MMOL/L (ref 3.5–5)
RBC # BLD: 3.4 E12/L (ref 3.5–5.5)
SARS-COV-2, NAAT: NOT DETECTED
SODIUM BLD-SCNC: 132 MMOL/L (ref 132–146)
TOTAL PROTEIN: 6 G/DL (ref 6.4–8.3)
VANCOMYCIN RANDOM: 25.9 MCG/ML (ref 5–40)
WBC # BLD: 5.3 E9/L (ref 4.5–11.5)

## 2022-05-11 PROCEDURE — 87635 SARS-COV-2 COVID-19 AMP PRB: CPT

## 2022-05-11 PROCEDURE — 90935 HEMODIALYSIS ONE EVALUATION: CPT | Performed by: INTERNAL MEDICINE

## 2022-05-11 PROCEDURE — 82962 GLUCOSE BLOOD TEST: CPT

## 2022-05-11 PROCEDURE — 6360000002 HC RX W HCPCS: Performed by: INTERNAL MEDICINE

## 2022-05-11 PROCEDURE — S5553 INSULIN LONG ACTING 5 U: HCPCS | Performed by: INTERNAL MEDICINE

## 2022-05-11 PROCEDURE — 2580000003 HC RX 258: Performed by: INTERNAL MEDICINE

## 2022-05-11 PROCEDURE — 80202 ASSAY OF VANCOMYCIN: CPT

## 2022-05-11 PROCEDURE — 36415 COLL VENOUS BLD VENIPUNCTURE: CPT

## 2022-05-11 PROCEDURE — 6370000000 HC RX 637 (ALT 250 FOR IP): Performed by: INTERNAL MEDICINE

## 2022-05-11 PROCEDURE — 80053 COMPREHEN METABOLIC PANEL: CPT

## 2022-05-11 PROCEDURE — 85027 COMPLETE CBC AUTOMATED: CPT

## 2022-05-11 RX ADMIN — TICAGRELOR 90 MG: 90 TABLET ORAL at 22:05

## 2022-05-11 RX ADMIN — OXYCODONE AND ACETAMINOPHEN 1 TABLET: 5; 325 TABLET ORAL at 16:54

## 2022-05-11 RX ADMIN — EPOETIN ALFA-EPBX 2000 UNITS: 2000 INJECTION, SOLUTION INTRAVENOUS; SUBCUTANEOUS at 12:07

## 2022-05-11 RX ADMIN — INSULIN GLARGINE-YFGN 5 UNITS: 100 INJECTION, SOLUTION SUBCUTANEOUS at 00:24

## 2022-05-11 RX ADMIN — NEPHROCAP 1 MG: 1 CAP ORAL at 22:07

## 2022-05-11 RX ADMIN — TIMOLOL MALEATE 1 DROP: 5 SOLUTION OPHTHALMIC at 22:21

## 2022-05-11 RX ADMIN — HEPARIN SODIUM 5000 UNITS: 10000 INJECTION INTRAVENOUS; SUBCUTANEOUS at 11:00

## 2022-05-11 RX ADMIN — HEPARIN SODIUM 5000 UNITS: 10000 INJECTION INTRAVENOUS; SUBCUTANEOUS at 22:10

## 2022-05-11 RX ADMIN — GABAPENTIN 200 MG: 100 CAPSULE ORAL at 14:14

## 2022-05-11 RX ADMIN — SODIUM CHLORIDE, PRESERVATIVE FREE 10 ML: 5 INJECTION INTRAVENOUS at 11:00

## 2022-05-11 RX ADMIN — ASPIRIN 81 MG: 81 TABLET, COATED ORAL at 11:00

## 2022-05-11 RX ADMIN — LANTHANUM CARBONATE 1000 MG: 500 TABLET, CHEWABLE ORAL at 17:14

## 2022-05-11 RX ADMIN — BRIMONIDINE TARTRATE 1 DROP: 2 SOLUTION OPHTHALMIC at 22:20

## 2022-05-11 RX ADMIN — PANTOPRAZOLE SODIUM 40 MG: 40 TABLET, DELAYED RELEASE ORAL at 07:00

## 2022-05-11 RX ADMIN — INSULIN GLARGINE-YFGN 5 UNITS: 100 INJECTION, SOLUTION SUBCUTANEOUS at 22:10

## 2022-05-11 RX ADMIN — ATORVASTATIN CALCIUM 80 MG: 80 TABLET, FILM COATED ORAL at 22:07

## 2022-05-11 RX ADMIN — GABAPENTIN 200 MG: 100 CAPSULE ORAL at 22:06

## 2022-05-11 RX ADMIN — ALLOPURINOL 100 MG: 100 TABLET ORAL at 11:00

## 2022-05-11 RX ADMIN — LANTHANUM CARBONATE 1000 MG: 500 TABLET, CHEWABLE ORAL at 11:58

## 2022-05-11 RX ADMIN — TICAGRELOR 90 MG: 90 TABLET ORAL at 11:00

## 2022-05-11 RX ADMIN — OXYCODONE AND ACETAMINOPHEN 1 TABLET: 5; 325 TABLET ORAL at 22:18

## 2022-05-11 ASSESSMENT — PAIN DESCRIPTION - DESCRIPTORS: DESCRIPTORS: ACHING

## 2022-05-11 ASSESSMENT — PAIN DESCRIPTION - LOCATION
LOCATION: BACK
LOCATION: BACK

## 2022-05-11 ASSESSMENT — PAIN SCALES - GENERAL
PAINLEVEL_OUTOF10: 7
PAINLEVEL_OUTOF10: 0
PAINLEVEL_OUTOF10: 0
PAINLEVEL_OUTOF10: 3
PAINLEVEL_OUTOF10: 0
PAINLEVEL_OUTOF10: 6

## 2022-05-11 ASSESSMENT — EJECTION FRACTION
EF_SOURCE: 2D ECHO
EF_VALUE: 40-45%

## 2022-05-11 NOTE — CARE COORDINATION
CM received a call from Betty George with [facility] and she has received pre-cert. Ambulance transport set up for 5:30P via [facility]. Spoke with patient at bedside and provided update. She has no questions or concerns and will contact family regarding her discharge back to the facility. Call made to Bria Jamison and provided update regarding patient's discharge.     Josefa Glover, MSW, LSW [phone]

## 2022-05-11 NOTE — PROGRESS NOTES
Hospitalist Progress Note      SYNOPSIS: Patient admitted on 2022 for NSTEMI (non-ST elevated myocardial infarction) ([facility] She has a PMH of lumbar osteomyelitis, coronary artery disease, ESRD, anemia, hypertension, hyperlipidemia, diabetes, history of bladder carcinoma. She was admitted with a complaint of osteomyelitis, and was discharged to rehab on vancomycin. Whilst in rehab, there was concern for STEMI on EKG, so interventional cardiology was consulted from the ER; they advised there was no acute stemi from EKG, but planned to do a cardiac cath in the morning. She was placed on heparin drip and transferred to [facility] for further workup. She was admitted and managed for nonstemi. SUBJECTIVE:    Patient seen and examined. She had no active complaints. She had just come back from dialysis. Review of systems was otherwise negative. Records reviewed. Temp (24hrs), Av.3 °F (36.3 °C), Min:96.8 °F (36 °C), Max:98.1 °F (36.7 °C)    DIET: ADULT DIET; Regular; Low Fat/Low Chol/High Fiber/2 gm Na; Low Sodium (2 gm); 1200 ml  ADULT ORAL NUTRITION SUPPLEMENT; Dinner; Renal Oral Supplement  ADULT ORAL NUTRITION SUPPLEMENT; Breakfast, Lunch; Wound Healing Oral Supplement  CODE: Full Code    Intake/Output Summary (Last 24 hours) at 2022 1325  Last data filed at 2022 1033  Gross per 24 hour   Intake 540 ml   Output 2300 ml   Net -1760 ml       OBJECTIVE:    BP (!) 153/58   Pulse 63   Temp 98.1 °F (36.7 °C)   Resp 18   Ht 5' 10\" (1.778 m)   Wt 175 lb 0.7 oz (79.4 kg)   SpO2 97%   BMI 25.12 kg/m²     General appearance: No apparent distress, appears stated age and cooperative. HEENT:  Conjunctivae/corneas clear. Neck: Supple. No jugular venous distention.    Respiratory: Clear to auscultation bilaterally, normal respiratory effort  Cardiovascular: Regular rate rhythm, normal S1-S2  Abdomen: Soft, nontender, nondistended  Musculoskeletal: No clubbing, cyanosis, no bilateral lower extremity edema. Brisk capillary refill. AV fistula with good thrill   Skin:  No rashes  on visible skin  Neurologic: awake, alert and following commands     ASSESSMENT:  #Nonstemi  -cardiology on board  -had cath on 5/5/22 and was found to have instent restenosis. -on aspirin, brilinta, statin and metoprolol    #Osteomyelitis of the lumbar spine  -s/p lumbar fusion of L1-2  -neurosurgery on board and recommends lumbar decompression and fusion once medically stable  -on IV vancomycin with dialysis  -patient wishes to go to [facility] for second opinion upon discharge  -ID on board. For 6 weeks of IV vancomycin    #ESRD: on HD MWF. Nephrology on board    #Hypertension: controlled. On metoprolol    #Type 2 diabetes mellitus  -on lantus 5 units daily. -ISS.  Accuchecks ACHS    #Gout: on allopurinol    DVT prophylaxis: heparin      DISPOSITION: awaiting placement    Medications:  REVIEWED DAILY    Infusion Medications    sodium chloride      sodium chloride      dextrose       Scheduled Medications    heparin (porcine)  5,000 Units SubCUTAneous BID    sodium chloride flush  5-40 mL IntraVENous 2 times per day    allopurinol  100 mg Oral Daily    atorvastatin  80 mg Oral Nightly    b complex-C-folic acid  1 mg Oral Nightly    bumetanide  2 mg Oral Once per day on Sun Tue Thu    gabapentin  200 mg Oral TID    insulin glargine-yfgn  5 Units SubCUTAneous Nightly    lanthanum  1,000 mg Oral TID WC    latanoprost  1 drop Right Eye Nightly    metoprolol succinate  25 mg Oral Daily    pantoprazole  40 mg Oral QAM AC    brimonidine  1 drop Right Eye BID    And    timolol  1 drop Right Eye BID    ticagrelor  90 mg Oral BID    vancomycin (VANCOCIN) intermittent dosing (placeholder)   Other RX Placeholder    aspirin  81 mg Oral Daily    epoetin fani-epbx  2,000 Units SubCUTAneous Q MWF    alteplase  2 mg IntraCATHeter Once     PRN Meds: sodium chloride flush, sodium chloride, acetaminophen, midodrine, sodium chloride, ondansetron **OR** ondansetron, polyethylene glycol, [DISCONTINUED] acetaminophen **OR** acetaminophen, lidocaine-prilocaine, oxyCODONE-acetaminophen, fentanNYL, glucose, dextrose, glucagon (rDNA), dextrose, heparin flush    Labs:     Recent Labs     05/09/22  0716 05/10/22  0600 05/11/22  0632   WBC 3.9* 4.4* 5.3   HGB 9.3* 8.9* 9.4*   HCT 30.5* 29.4* 30.5*    190 205       Recent Labs     05/09/22  0716 05/10/22  0600 05/11/22  0632    134 132   K 4.4 4.1 4.5   CL 94* 96* 95*   CO2 29 31* 28   BUN 28* 19 26*   CREATININE 5.1* 3.5* 4.4*   CALCIUM 9.1 8.9 9.3       Recent Labs     05/09/22  0716 05/10/22  0600 05/11/22  0632   PROT 5.9* 5.7* 6.0*   ALKPHOS 142* 135* 134*   ALT 16 24 23   AST 29 37* 32*   BILITOT 0.3 0.3 0.3       No results for input(s): INR in the last 72 hours. No results for input(s): Leena Beat in the last 72 hours. Chronic labs:    Lab Results   Component Value Date    CHOL 103 05/04/2022    TRIG 66 05/04/2022    HDL 42 05/04/2022    LDLCALC 48 05/04/2022    TSH 3.120 05/04/2022    INR 1.0 01/20/2022    LABA1C 5.1 05/04/2022       Radiology: REVIEWED DAILY    +++++++++++++++++++++++++++++++++++++++++++++++++  Zari Miller MD  [employer] - [address] [place] [address], [place]  +++++++++++++++++++++++++++++++++++++++++++++++++  NOTE: This report was transcribed using voice recognition software. Every effort was made to ensure accuracy; however, inadvertent computerized transcription errors may be present.

## 2022-05-11 NOTE — PROGRESS NOTES
[facility]  Nephrology Progress Note    Patient's Name: Isabelle Asher      History of Present Illness from 5/3 consult note:    Sara Gaytan is a 78 y.o. female with a past medical history of breast cancer, coronary artery disease, hypertension, hyperlipidemia, and anemia. She presented to the [facility] ED on 5/2 with complaints of back and abdominal pain patient was subsequently transferred to [facility] for NSTEMI. Vital signs today include temperature 97.5, respirations 15, pulse 61, BP 98/42, and she was 94% on room air. Lab data from 5/2 include creatinine 2.5, troponin 535, and hemoglobin 9.1. Cardiology is consulted to see the patient. We were consulted to see the patient for dialysis. Patient is known to our service and dialyzes at [address] MWF 1st shift left AV fistula. At present, patient was seen and examined. She reports that she feels \"fair. \"  She reports right hip pain today. She reports that her appetite was fair before coming in to the hospital.  She denied any chest pain or shortness of breath. She denies any nausea, vomiting, or diarrhea. \"    Subjective:    5/11: Patient was seen and examined. She reports that she feels \"fair\" today. She denies any current chest pain or shortness of breath. No abdominal pain or nausea. She denies any issues overnight. PMH:    Past Medical History:   Diagnosis Date    Acute infection of bone ([address]     infection of rt foot, resolved.     Acute osteomyelitis of phalanx of left hand ([address] 1/27/2022    Left third distal phalanx    Anemia of chronic disease     Arthritis     Breast cancer ([address]     right breast, 2008/ bladder, 2006- last chemotherapy \"years ago\"    CAD (coronary artery disease)     Carpal tunnel syndrome     bilat - for OR left hand 3-17-20     Chronic diastolic CHF (congestive heart failure) ([address] 09/23/2014 9/23/14- echocardiogram revealed moderate LV concentric hypertrophy, stage III diastolic dysfunction, mild tricuspid regurgitation    CKD (chronic kidney disease) stage 4, GFR 15-29 ml/min (HCC)     Diabetic retinopathy (HCC)     Glaucoma     Hemodialysis patient ([address]     [facility]- Dr. Dante Block - left arm fistula     Hyperkalemia, diminished renal excretion 11/9/2017    Hyperlipidemia     Hypertension     Hypoglycemia unawareness in type 1 diabetes mellitus ([address] 11/7/2017    Insulin dependent type 2 diabetes mellitus ([address]     Neuropathy     feet    Osteomyelitis due to secondary diabetes ([address]     rt great toe with amputation    Patient is Christian 11/7/2017    Refusal of blood product     patient states she dose not take blood transfusion    Ventricular hypertrophy     Vitreous hemorrhage (HCC)     left eye     Patient Active Problem List   Diagnosis    Diabetic retinopathy ([address]    Malignant neoplasm of right female breast ([address]    Atherosclerosis of native coronary artery of native heart without angina pectoris    Moderate obesity    Left ventricular hypertrophy    Herniated lumbar intervertebral disc    Lumbar degenerative disc disease    Pseudomeningocele    Lumbar radiculopathy    Lymphedema of arm    CKD (chronic kidney disease) stage 4, GFR 15-29 ml/min (HCC)    Insulin dependent type 2 diabetes mellitus (HCC)    Anemia of chronic disease    Chronic diastolic CHF (congestive heart failure) (HCC)    Neuropathy    Hypertension    Glaucoma    Refusal of blood product    Pancytopenia ([address]    Controlled type 2 diabetes mellitus with chronic kidney disease on chronic dialysis, with long-term current use of insulin (HCC)    Vitreous hemorrhage ([address]    Patient is Christian    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 ([address]    Hyperkalemia, diminished renal excretion    Chronic pain syndrome    Lumbar post-laminectomy syndrome    Myalgia    Cervicalgia    Diabetic peripheral neuropathy [facility]    ESRD (end stage renal disease) ([facility]    Bilateral carpal tunnel syndrome    Spinal stenosis of lumbar region with neurogenic claudication    Cardiac arrest ([facility]    ESRD (end stage renal disease) on dialysis ([facility]    Mixed hyperlipidemia    Lymphedema of right upper extremity    Coronary artery disease involving native coronary artery of native heart with angina pectoris (HCC)    Ventricular tachycardia (HCC)    Mitral valve disease    CAD in native artery    Lumbar stenosis without neurogenic claudication    Lumbar foraminal stenosis    Back pain    Intractable low back pain    Unable to ambulate    NSTEMI (non-ST elevated myocardial infarction) ([facility]    Moderate protein-calorie malnutrition (HCC)     Meds:     heparin (porcine)  5,000 Units SubCUTAneous BID    sodium chloride flush  5-40 mL IntraVENous 2 times per day    allopurinol  100 mg Oral Daily    atorvastatin  80 mg Oral Nightly    b complex-C-folic acid  1 mg Oral Nightly    bumetanide  2 mg Oral Once per day on Sun Tue Thu    gabapentin  200 mg Oral TID    insulin glargine-yfgn  5 Units SubCUTAneous Nightly    lanthanum  1,000 mg Oral TID WC    latanoprost  1 drop Right Eye Nightly    metoprolol succinate  25 mg Oral Daily    pantoprazole  40 mg Oral QAM AC    brimonidine  1 drop Right Eye BID    And    timolol  1 drop Right Eye BID    ticagrelor  90 mg Oral BID    vancomycin (VANCOCIN) intermittent dosing (placeholder)   Other RX Placeholder    aspirin  81 mg Oral Daily    epoetin fani-epbx  2,000 Units SubCUTAneous Q MWF    alteplase  2 mg IntraCATHeter Once      sodium chloride      sodium chloride      dextrose       Meds prn:     sodium chloride flush, sodium chloride, acetaminophen, midodrine, sodium chloride, ondansetron **OR** ondansetron, polyethylene glycol, [DISCONTINUED] acetaminophen **OR** acetaminophen, lidocaine-prilocaine, oxyCODONE-acetaminophen, fentanNYL, glucose, dextrose, glucagon (rDNA), dextrose, heparin flush    Meds prior to admission:     No current facility-administered medications on file prior to encounter. Current Outpatient Medications on File Prior to Encounter   Medication Sig Dispense Refill    vancomycin (VANCOCIN) infusion Infuse 750 mg intravenously three times a week Compound per protocol. 9 g 1    midodrine (PROAMATINE) 5 MG tablet Take 5 mg by mouth daily as needed      gabapentin (NEURONTIN) 100 MG capsule Take 2 capsules by mouth 3 times daily for 180 days. 180 capsule 5    atorvastatin (LIPITOR) 80 MG tablet Take 80 mg by mouth nightly      bumetanide (BUMEX) 2 MG tablet Take 2 mg by mouth three times a week Given Sunday,Tuesday,Thursday      isosorbide mononitrate (IMDUR) 30 MG extended release tablet Take 30 mg by mouth daily      insulin glargine (LANTUS) 100 UNIT/ML injection vial Inject 5 Units into the skin nightly       lidocaine-prilocaine (EMLA) 2.5-2.5 % cream Apply topically as needed for Pain       metoprolol succinate (TOPROL XL) 25 MG extended release tablet Take 1 tablet by mouth daily 90 tablet 1    lanthanum (FOSRENOL) 1000 MG chewable tablet Take 1,000 mg by mouth 3 times daily (with meals)       allopurinol (ZYLOPRIM) 100 MG tablet Take 1 tablet by mouth daily 90 tablet 1    ticagrelor (BRILINTA) 90 MG TABS tablet Take 1 tablet by mouth 2 times daily 180 tablet 1    B Complex-C-Folic Acid (BONI CAPS) 1 MG CAPS Take 1 mg by mouth nightly       omeprazole (PRILOSEC) 20 MG delayed release capsule Take 20 mg by mouth daily as needed       LUMIGAN 0.01 % SOLN ophthalmic drops Place 1 drop into the right eye nightly       COMBIGAN 0.2-0.5 % ophthalmic solution Place 1 drop into the right eye every 12 hours   7     Allergies:    Furosemide    Social History:     reports that she has never smoked. She has never used smokeless tobacco. She reports that she does not drink alcohol and does not use drugs.     Family History: Problem Relation Age of Onset    Breast Cancer Mother 61    Hypertension Mother     Heart Disease Father     Prostate Cancer Father     Breast Cancer Maternal Grandmother 61     Physical Exam:    Patient Vitals for the past 24 hrs:   BP Temp Temp src Pulse Resp SpO2 Weight   05/11/22 1033 (!) 153/58 98.1 °F (36.7 °C) -- 63 18 -- 175 lb 0.7 oz (79.4 kg)   05/11/22 1002 (!) 161/36 -- -- 63 -- -- --   05/11/22 0932 (!) 163/38 -- -- 60 -- -- --   05/11/22 0902 (!) 157/37 -- -- 60 -- -- --   05/11/22 0834 (!) 148/38 -- -- 59 -- -- --   05/11/22 0804 (!) 137/33 -- -- 58 -- -- --   05/11/22 0734 (!) 162/42 -- -- 58 -- -- --   05/11/22 0704 (!) 129/30 -- -- 55 -- -- --   05/11/22 0633 (!) 162/49 -- -- 55 -- -- --   05/11/22 0628 (!) 161/47 97.4 °F (36.3 °C) -- 56 18 -- 178 lb 5.6 oz (80.9 kg)   05/11/22 0551 -- -- -- -- -- -- 167 lb 12.8 oz (76.1 kg)   05/11/22 0015 (!) 127/54 96.8 °F (36 °C) -- 61 16 97 % --   05/10/22 1440 (!) 114/39 97 °F (36.1 °C) Oral 58 16 100 % --       Intake/Output Summary (Last 24 hours) at 5/11/2022 1108  Last data filed at 5/11/2022 1033  Gross per 24 hour   Intake 540 ml   Output 2300 ml   Net -1760 ml     General: Awake, alert, no acute distress  Neck: No JVD noted  Lungs: Clear bilaterally upper, diminished to the bases bilaterally. Unlabored  CV: Regular rate and rhythm. No rub  Abd: Soft, nontender, nondistended. Active bowel sounds  Skin: Warm and dry. No rash on exposed extremities  Ext: 1+ RUE edema (h/o breast CA);  No BLE edema ; left AV fistula   Neuro: Awake, answers questions appropriately    Data:    Recent Labs     05/09/22  0716 05/10/22  0600 05/11/22  0632   WBC 3.9* 4.4* 5.3   HGB 9.3* 8.9* 9.4*   HCT 30.5* 29.4* 30.5*   MCV 88.9 89.1 89.7    190 205     Recent Labs     05/09/22  0716 05/10/22  0600 05/11/22  0632    134 132   K 4.4 4.1 4.5   CL 94* 96* 95*   CO2 29 31* 28   CREATININE 5.1* 3.5* 4.4*   BUN 28* 19 26*   LABGLOM 10 16 12   GLUCOSE 106* 65* 96   CALCIUM 9.1 8.9 9.3     Vit D, 25-Hydroxy   Date Value Ref Range Status   03/11/2022 28 (L) 30 - 100 ng/mL Final     Comment:     <20 ng/mL. ........... Amadou Neo Deficient  20-30 ng/mL. ......... Amadou Neo Insufficient   ng/mL. ........ Amadou Neo Sufficient  >100 ng/mL. .......... Amadou Neo Toxic       PTH   Date Value Ref Range Status   03/11/2022 241 (H) 15 - 65 pg/mL Final     Recent Labs     05/09/22  0716 05/10/22  0600 05/11/22  0632   ALT 16 24 23   AST 29 37* 32*   ALKPHOS 142* 135* 134*   BILITOT 0.3 0.3 0.3     Recent Labs     05/09/22  0716 05/10/22  0600 05/11/22  0632   LABALBU 2.9* 3.2* 3.1*     Ferritin   Date Value Ref Range Status   11/09/2017 334 ng/mL Final     Comment:     FERRITIN Reference Ranges:  Adult Males   20 - 60 yrars:    30 - 400 ng/mL  Adult females 17 - 60 years:    13 - 150 ng/mL  Adults greater than 60 years:   no established reference range  Pediatrics:                     no established reference range       Iron   Date Value Ref Range Status   11/09/2017 33 (L) 37 - 145 mcg/dL Final     TIBC   Date Value Ref Range Status   11/09/2017 222 (L) 250 - 450 mcg/dL Final     Vitamin B-12   Date Value Ref Range Status   02/17/2017 1802 (H) 211 - 946 pg/mL Final     Folate   Date Value Ref Range Status   02/17/2017 >20.0 4.8 - 24.2 ng/mL Final     Lab Results   Component Value Date    COLORU Yellow 11/07/2017    NITRU Negative 11/07/2017    GLUCOSEU Negative 11/07/2017    GLUCOSEU NEGATIVE 08/27/2011    KETUA Negative 11/07/2017    UROBILINOGEN 0.2 11/07/2017    BILIRUBINUR Negative 11/07/2017    BILIRUBINUR NEGATIVE 08/27/2011     No results found for: ALVINO, CREURRAN, MACREATRATIO, OSMOU    No components found for: URIC    No results found for: LIPIDPAN    Assessment and Plans:    1.   ESRD on HD  OP [facility] MWF 1st shift   left AV fistula  Continue HD MWF    2.  NSTEMI  Troponin 535 on 5/2--> 1388 on 5/3  S/p heparin drip  S/p cardiac catheterization with balloon angioplasty 5/5  Cardiology previously following    3. Back pain  S/p bone biopsy 4/18 for lumbar spine osteomyelitis  On vancomycin  Neurosurgery following    4. Hypertension  BP goal<140/90  BP at goal  Monitor on current regimen and with HD    5. Anemia  Hemoglobin target 10-12  Hemoglobin 9.4 today  Continue JAYSHREE as hemoglobin below target  Transfuse for hemoglobin<7  Monitor H&H    6.  Secondary hyperparathyroidism of renal origin  PTH noted in the outpatient setting on 4/25/2022 was 443  Phosphorus 3.2 on 5/4  Continue on [address], APRN - CNP     Patient seen and examined all key components of the physical performed independently , case discussed with NP, all pertinent labs and radiologic tests personally reviewed agree with above.       Eleno Alfaro MD

## 2022-05-11 NOTE — PLAN OF CARE
Problem: Chronic Conditions and Co-morbidities  Goal: Patient's chronic conditions and co-morbidity symptoms are monitored and maintained or improved  Outcome: Completed     Problem: Discharge Planning  Goal: Discharge to home or other facility with appropriate resources  Outcome: Completed  Flowsheets (Taken 5/11/2022 1100)  Discharge to home or other facility with appropriate resources: Refer to discharge planning if patient needs post-hospital services based on physician order or complex needs related to functional status, cognitive ability or social support system     Problem: Pain  Goal: Verbalizes/displays adequate comfort level or baseline comfort level  Outcome: Completed     Problem: Safety - Adult  Goal: Free from fall injury  Outcome: Completed     Problem: Nutrition Deficit:  Goal: Optimize nutritional status  Outcome: Completed     Problem: Skin/Tissue Integrity  Goal: Absence of new skin breakdown  Description: 1. Monitor for areas of redness and/or skin breakdown  2. Assess vascular access sites hourly  3. Every 4-6 hours minimum:  Change oxygen saturation probe site  4. Every 4-6 hours:  If on nasal continuous positive airway pressure, respiratory therapy assess nares and determine need for appliance change or resting period.   Outcome: Completed     Problem: ABCDS Injury Assessment  Goal: Absence of physical injury  Outcome: Completed

## 2022-05-11 NOTE — DISCHARGE SUMMARY
Hospital Medicine Discharge Summary    Patient ID: Martha Francois      Patient's PCP: Mickey Castanon MD    Admit Date: 5/2/2022     Discharge Date:  5/11/2022    Admitting Physician: Jen Hart DO     Discharge Physician: Estela Sandoval MD     Discharge Diagnoses: Active Hospital Problems    Diagnosis Date Noted    Moderate protein-calorie malnutrition ([facility] [E44.0] 05/03/2022     Priority: Medium    NSTEMI (non-ST elevated myocardial infarction) [facility]) [I21.4] 05/02/2022     Priority: Medium       The patient was seen and examined on day of discharge and this discharge summary is in conjunction with any daily progress note from day of discharge. Hospital Course: Patient admitted on 5/2/2022 for NSTEMI (non-ST elevated myocardial infarction) ([facility] She has a PMH of lumbar osteomyelitis, coronary artery disease, ESRD, anemia, hypertension, hyperlipidemia, diabetes, history of bladder carcinoma. She was admitted with a complaint of osteomyelitis, and was discharged to rehab on vancomycin. Whilst in rehab, there was concern for STEMI on EKG, so interventional cardiology was consulted from the ER; they advised there was no acute stemi from EKG, but planned to do a cardiac cath in the morning. She was placed on heparin drip and transferred to [facility] for further workup. She was admitted and managed for nonstemi. She had cardiac cath which showed instent restenosis of her RCA stent for which she had balloon angioplasty. She remained stable and was discharged back to her SNF on 5/11/2022. Of note, neurosurgery was consulted during this admission due to her lumbar osteomyelitis. No new intervention was needed per neurology, and patient expressed desire to follow up with [facility] on outpatient basis. Patient remained stable and was discharged to SNF on 5/11/2022. She is to complete her 6 week course of IV vancomycin and is to follow up with her PCP, neurosugery, cardiology and ID.     Patient was seen and examined prior to discharge. She had no active complaints and had an uneventful night. Review of systems was otherwise negative. Labs and vitals reviewed. Home meds reviewed and reconciled. Patient was clinically stable at time of discharge. Exam:     BP (!) 144/55   Pulse 66   Temp 98.1 °F (36.7 °C) (Oral)   Resp 18   Ht 5' 10\" (1.778 m)   Wt 175 lb 0.7 oz (79.4 kg)   SpO2 97%   BMI 25.12 kg/m²     General appearance: No apparent distress, appears stated age and cooperative. HEENT: Pupils equal, round, and reactive to light. Conjunctivae/corneas clear. Neck: Supple, with full range of motion. No jugular venous distention. Trachea midline. Respiratory:  Normal respiratory effort. Clear to auscultation, bilaterally without Rales/Wheezes/Rhonchi. Cardiovascular: Regular rate and rhythm with normal S1/S2 without murmurs, rubs or gallops. Abdomen: Soft, non-tender, non-distended with normal bowel sounds. Musculoskeletal: No clubbing, cyanosis or edema bilaterally. Full range of motion without deformity. Skin: Skin color, texture, turgor normal.  No rashes or lesions. AV fistula with good thrill in LUE  Neurologic:  Neurovascularly intact without any focal sensory/motor deficits. Cranial nerves: II-XII intact, grossly non-focal.  Psychiatric: Alert and oriented, thought content appropriate, normal insight      Consults:     IP CONSULT TO CARDIOLOGY  IP CONSULT TO NEUROSURGERY  IP CONSULT TO NEPHROLOGY  PHARMACY TO DOSE VANCOMYCIN  IP CONSULT TO DIETITIAN  IP CONSULT TO NEUROSURGERY  IP CONSULT TO CARDIAC REHAB    Significant Diagnostic Studies:     CT LUMBAR SPINE WO CONTRAST   Final Result   No significant interval change. Again seen are endplate erosions at P5-5, highly suggestive of osteomyelitis. Erosions at L2-3 are more chronic appearing, but are also unchanged from   prior. Again seen are changes of posterior spinal fusion from L3-5.       Grossly stable severe spinal stenosis at L2-3 and moderate spinal stenosis at   L1-2. High-grade neural foraminal stenoses at these levels are grossly   stable. Note that CT is inferior to MRI for the evaluation of spinal/neural   foraminal stenosis. MRI could also better evaluate the extent of   discitis-osteomyelitis. Disposition:  SNF    Discharge Instructions/Follow-up:  PCP, neurosurgery, cardiology, nephrology and ID    Code Status:  Full Code     Activity: activity as tolerated    Diet: cardiac diet    Labs: For convenience and continuity at follow-up the following most recent labs are provided:      CBC:    Lab Results   Component Value Date    WBC 5.3 05/11/2022    HGB 9.4 05/11/2022    HCT 30.5 05/11/2022     05/11/2022       Renal:    Lab Results   Component Value Date     05/11/2022    K 4.5 05/11/2022    K 3.6 05/02/2022    CL 95 05/11/2022    CO2 28 05/11/2022    BUN 26 05/11/2022    CREATININE 4.4 05/11/2022    CALCIUM 9.3 05/11/2022    PHOS 3.2 05/04/2022       Discharge Medications:     Current Discharge Medication List           Details   oxyCODONE-acetaminophen (PERCOCET) 5-325 MG per tablet Take 1 tablet by mouth every 6 hours as needed for Pain for up to 3 days. Qty: 12 tablet, Refills: 0    Comments: Reduce doses taken as pain becomes manageable  Associated Diagnoses: Intractable low back pain; Lumbar foraminal stenosis      aspirin 81 MG EC tablet Take 1 tablet by mouth daily  Qty: 30 tablet, Refills: 0              Details   vancomycin (VANCOCIN) infusion Infuse 750 mg intravenously three times a week Compound per protocol. Qty: 9 g, Refills: 1      midodrine (PROAMATINE) 5 MG tablet Take 5 mg by mouth daily as needed      gabapentin (NEURONTIN) 100 MG capsule Take 2 capsules by mouth 3 times daily for 180 days.   Qty: 180 capsule, Refills: 5    Associated Diagnoses: Medication refill      atorvastatin (LIPITOR) 80 MG tablet Take 80 mg by mouth nightly      bumetanide (BUMEX) 2 MG tablet Take 2 mg by mouth three times a week Given Sunday,Tuesday,Thursday      isosorbide mononitrate (IMDUR) 30 MG extended release tablet Take 30 mg by mouth daily      insulin glargine (LANTUS) 100 UNIT/ML injection vial Inject 5 Units into the skin nightly       lidocaine-prilocaine (EMLA) 2.5-2.5 % cream Apply topically as needed for Pain       metoprolol succinate (TOPROL XL) 25 MG extended release tablet Take 1 tablet by mouth daily  Qty: 90 tablet, Refills: 1    Associated Diagnoses: Medication refill      lanthanum (FOSRENOL) 1000 MG chewable tablet Take 1,000 mg by mouth 3 times daily (with meals)       allopurinol (ZYLOPRIM) 100 MG tablet Take 1 tablet by mouth daily  Qty: 90 tablet, Refills: 1    Associated Diagnoses: Medication refill      ticagrelor (BRILINTA) 90 MG TABS tablet Take 1 tablet by mouth 2 times daily  Qty: 180 tablet, Refills: 1    Associated Diagnoses: Medication refill      B Complex-C-Folic Acid (BONI CAPS) 1 MG CAPS Take 1 mg by mouth nightly       omeprazole (PRILOSEC) 20 MG delayed release capsule Take 20 mg by mouth daily as needed       LUMIGAN 0.01 % SOLN ophthalmic drops Place 1 drop into the right eye nightly       COMBIGAN 0.2-0.5 % ophthalmic solution Place 1 drop into the right eye every 12 hours   Refills: 7             Time Spent on discharge is more than 1 hour in the examination, evaluation, counseling and review of medications and discharge plan.       Signed:    Caitlin Richardson MD   5/11/2022

## 2022-05-11 NOTE — FLOWSHEET NOTE
05/11/22 1033   Vital Signs   BP (!) 153/58   Temp 98.1 °F (36.7 °C)   Pulse 63   Resp 18   Weight 175 lb 0.7 oz (79.4 kg)   Weight Method Bed scale   Percent Weight Change -1.85   Post-Hemodialysis Assessment   Post-Treatment Procedures Blood returned; Access bleeding time < 10 minutes   Machine Disinfection Process Exterior Machine Disinfection   Rinseback Volume (ml) 300 ml   Total Liters Processed (l/min) 90.4 l/min   Dialyzer Clearance Lightly streaked   Duration of Treatment (minutes) 240 minutes   Heparin amount administered during treatment (units) 0 units   Hemodialysis Intake (ml) 300 ml   Hemodialysis Output (ml) 2300 ml   NET Removed (ml) 2000 ml   Tolerated Treatment Good   Patient Response to Treatment tolerated well, profile B, refill noted, 2L fluid removal   Bilateral Breath Sounds Diminished   Edema Right upper extremity; Left upper extremity   Patient Disposition Return to room

## 2022-05-11 NOTE — PROGRESS NOTES
Difficulty with ambulation,  Radicular pain persists. In dialysis. She wants to go to [facility] for another opinion  Will be glad to follow up in my office after discharge  Nothing new to add from neurosurgical stand point at this time.

## 2022-05-11 NOTE — PROGRESS NOTES
Pharmacy Consultation Note  (Antibiotic Dosing and Monitoring)    Initial consult date: 5/3/22  Consulting physician/provider: Dr. Sherly Ivy  Drug: Vancomycin  Indication: Osteomyelitis     Age/  Gender Height Weight IBW  Allergy Information   67 y.o./female 5' 10\" (177.8 cm)  5' 10\" (177.8 cm) 163 lb (73.9 kg)     Ideal body weight: 68.5 kg (151 lb 0.2 oz)  Adjusted ideal body weight: 73.5 kg (161 lb 15.2 oz)   Furosemide      Renal Function:  Recent Labs     05/09/22  0716 05/10/22  0600 05/11/22  0632   BUN 28* 19 26*   CREATININE 5.1* 3.5* 4.4*       Intake/Output Summary (Last 24 hours) at 5/11/2022 0904  Last data filed at 5/10/2022 2158  Gross per 24 hour   Intake 240 ml   Output 0 ml   Net 240 ml       Vancomycin Monitoring:  Trough:  No results for input(s): VANCOTROUGH in the last 72 hours. Random:    Recent Labs     05/11/22  0632   VANCORANDOM 25.9       Vancomycin Administration Times:    Date  SCr/CrCl       or HD Drug/Dose Time   Given Level(s)   (Time)   5/3 No HD -- -- 22.9   (0400)   5/4 HD x4 hrs Vancomycin 1000 mg IV x1 <1700> 18.8   (0710)   5/5 No HD -- -- 19.7   (0630)   5/6 HD x 4 hrs Vancomycin 1250 IV x 1 14:31 17.4  (0745)   5/7 No HD -- -- --   5/8 No HD -- -- 23.5  (0600)   5/9 HD x 4 hrs Vancomycin 1000 IV x 1 1400 See yesterday   5/10 No HD -- -- --   5/11 HD No dose needed today, pre-HD level 25.9 -- 25.9  (0630)     Assessment:  · Patient is a 78 y.o. female who has been continued on vancomycin for Hx osteomyelitis from last admission  · Receiving vancomycin 750 mg every MWF with dialysis x 6 weeks (End date 6/1/2022) per ID  Estimated Creatinine Clearance: 14 mL/min (A) (based on SCr of 4.4 mg/dL (H)). · To dose vancomycin, pharmacy will be utilizing dosing based off of levels due to patient requiring hemodialysis. Plan:  · HD today. · Pre-HD level 25.9- no dose needed today. Will follow levels and redose when needed. · Will continue to monitor patient.   · Clinical pharmacy to follow. Lorie Haq PharmD, [facility], BCPS 5/11/2022 9:04 AM

## 2022-05-20 ENCOUNTER — TELEPHONE (OUTPATIENT)
Dept: FAMILY MEDICINE CLINIC | Age: 67
End: 2022-05-20

## 2022-05-20 ENCOUNTER — TELEPHONE (OUTPATIENT)
Dept: CARDIOLOGY CLINIC | Age: 67
End: 2022-05-20

## 2022-05-20 NOTE — TELEPHONE ENCOUNTER
Patient has cath scheduled for cath 05/31 but was made prior to hospital admission for NSTEMI and cath was done then with Dr. Blaine Bledsoe:  1. In-stent stenosis of mid RCA with successful balloon angioplasty  (pre-PCI LATOSHA-3 flow, post-PCI LATOSHA-3 flow, pre-PCI stenosis 90%,  post-PCI stenosis less than 10%). 2.  Patent stent in the LAD. 3.  Totally occluded left circumflex artery. Sister asking if the scheduled cath is needed or if it is to be cancelled. She also states patient has COVID and if the PCP decides to order any of the approved COVID treatments, is there any contraindications with them.  Please advise

## 2022-05-20 NOTE — TELEPHONE ENCOUNTER
----- Message from Lois Thornton sent at 5/20/2022 10:21 AM EDT -----  Subject: Message to Provider    QUESTIONS  Information for Provider? Patient sister Ren Dupree) called she is in a   [facility]. Admitted on 5/12 and projected release date   from the rehab is 25th. She is COVID positive and she has an appt. on 5/24   she wanted to know if she should still come and also what other medication   to take for COVID due to the [address] not giving her any medication. Would like something prescribed to her for help.  ---------------------------------------------------------------------------  --------------  CALL BACK INFO  What is the best way for the office to contact you? OK to leave message on   voicemail  Preferred Call Back Phone Number? [phone]  ---------------------------------------------------------------------------  --------------  SCRIPT ANSWERS  Relationship to Patient? Other  Representative Name? Durant Henrry (sister)  Is the Representative on the appropriate HIPAA document in Epic?  Yes

## 2022-05-20 NOTE — TELEPHONE ENCOUNTER
Advised sister patient is currently under care of facility doctor, all orders must come from that physician at this time. Advised appointments would be canceled. She asked of treatment options for COVID positive, I advised that currently the medications that are being used are for the unvaccinated, they have not done studies with these medications on vaccinated patients, but again these orders would need to come from house doctor at nursing facility.

## 2022-05-26 ENCOUNTER — TELEPHONE (OUTPATIENT)
Dept: FAMILY MEDICINE CLINIC | Age: 67
End: 2022-05-26

## 2022-05-26 NOTE — TELEPHONE ENCOUNTER
Madisyn Lozano called from [facility]. She said the patient is discharging this weekend. Will you follow home care?

## 2022-06-02 ENCOUNTER — HOSPITAL ENCOUNTER (OUTPATIENT)
Dept: OTHER | Age: 67
Discharge: HOME | End: 2022-06-02

## 2022-06-15 ENCOUNTER — APPOINTMENT (OUTPATIENT)
Dept: GENERAL RADIOLOGY | Age: 67
DRG: 177 | End: 2022-06-15
Payer: COMMERCIAL

## 2022-06-15 ENCOUNTER — HOSPITAL ENCOUNTER (INPATIENT)
Age: 67
LOS: 13 days | Discharge: NURSING, REHAB OR LONG-TERM CARE | DRG: 177 | End: 2022-06-28
Attending: EMERGENCY MEDICINE | Admitting: FAMILY MEDICINE
Payer: COMMERCIAL

## 2022-06-15 ENCOUNTER — APPOINTMENT (OUTPATIENT)
Dept: CT IMAGING | Age: 67
DRG: 177 | End: 2022-06-15
Payer: COMMERCIAL

## 2022-06-15 DIAGNOSIS — M51.36 LUMBAR DEGENERATIVE DISC DISEASE: Chronic | ICD-10-CM

## 2022-06-15 DIAGNOSIS — R52 PAIN: ICD-10-CM

## 2022-06-15 DIAGNOSIS — M48.061 LUMBAR STENOSIS WITHOUT NEUROGENIC CLAUDICATION: ICD-10-CM

## 2022-06-15 DIAGNOSIS — J96.01 ACUTE RESPIRATORY FAILURE WITH HYPOXIA (HCC): Primary | ICD-10-CM

## 2022-06-15 DIAGNOSIS — M51.26 HERNIATED LUMBAR INTERVERTEBRAL DISC: ICD-10-CM

## 2022-06-15 PROBLEM — R41.82 AMS (ALTERED MENTAL STATUS): Status: ACTIVE | Noted: 2022-06-15

## 2022-06-15 LAB
ABO/RH: NORMAL
ACETAMINOPHEN LEVEL: <5 MCG/ML (ref 10–30)
ALBUMIN SERPL-MCNC: 3.5 G/DL (ref 3.5–5.2)
ALP BLD-CCNC: 155 U/L (ref 35–104)
ALT SERPL-CCNC: 16 U/L (ref 0–32)
AMMONIA: 10.1 UMOL/L (ref 11–51)
ANION GAP SERPL CALCULATED.3IONS-SCNC: 13 MMOL/L (ref 7–16)
ANTIBODY SCREEN: NORMAL
AST SERPL-CCNC: 19 U/L (ref 0–31)
B.E.: 2.1 MMOL/L (ref -3–3)
BASOPHILS ABSOLUTE: 0.01 E9/L (ref 0–0.2)
BASOPHILS RELATIVE PERCENT: 0.1 % (ref 0–2)
BILIRUB SERPL-MCNC: 0.7 MG/DL (ref 0–1.2)
BUN BLDV-MCNC: 12 MG/DL (ref 6–23)
CALCIUM SERPL-MCNC: 8.9 MG/DL (ref 8.6–10.2)
CHLORIDE BLD-SCNC: 99 MMOL/L (ref 98–107)
CO2: 25 MMOL/L (ref 22–29)
COHB: 1.7 % (ref 0–1.5)
CREAT SERPL-MCNC: 2.5 MG/DL (ref 0.5–1)
CRITICAL: ABNORMAL
DATE ANALYZED: ABNORMAL
DATE OF COLLECTION: ABNORMAL
EOSINOPHILS ABSOLUTE: 0 E9/L (ref 0.05–0.5)
EOSINOPHILS RELATIVE PERCENT: 0 % (ref 0–6)
ETHANOL: <10 MG/DL (ref 0–0.08)
GFR AFRICAN AMERICAN: 23
GFR NON-AFRICAN AMERICAN: 23 ML/MIN/1.73
GLUCOSE BLD-MCNC: 93 MG/DL (ref 74–99)
HCO3: 27.6 MMOL/L (ref 22–26)
HCT VFR BLD CALC: 34.2 % (ref 34–48)
HEMOGLOBIN: 10.3 G/DL (ref 11.5–15.5)
HHB: 1.4 % (ref 0–5)
IMMATURE GRANULOCYTES #: 0.04 E9/L
IMMATURE GRANULOCYTES %: 0.3 % (ref 0–5)
INFLUENZA A BY PCR: NOT DETECTED
INFLUENZA B BY PCR: NOT DETECTED
INR BLD: 1.1
LAB: ABNORMAL
LACTIC ACID, SEPSIS: 0.8 MMOL/L (ref 0.5–1.9)
LACTIC ACID: 1 MMOL/L (ref 0.5–2.2)
LYMPHOCYTES ABSOLUTE: 0.87 E9/L (ref 1.5–4)
LYMPHOCYTES RELATIVE PERCENT: 7.1 % (ref 20–42)
Lab: ABNORMAL
MAGNESIUM: 2.2 MG/DL (ref 1.6–2.6)
MCH RBC QN AUTO: 28.2 PG (ref 26–35)
MCHC RBC AUTO-ENTMCNC: 30.1 % (ref 32–34.5)
MCV RBC AUTO: 93.7 FL (ref 80–99.9)
METHB: 0.3 % (ref 0–1.5)
MODE: ABNORMAL
MONOCYTES ABSOLUTE: 0.73 E9/L (ref 0.1–0.95)
MONOCYTES RELATIVE PERCENT: 6 % (ref 2–12)
NEUTROPHILS ABSOLUTE: 10.53 E9/L (ref 1.8–7.3)
NEUTROPHILS RELATIVE PERCENT: 86.5 % (ref 43–80)
O2 CONTENT: 15.1 ML/DL
O2 SATURATION: 98.6 % (ref 92–98.5)
O2HB: 96.6 % (ref 94–97)
OPERATOR ID: 1210
OSMOLALITY: 287 MOSM/KG (ref 285–310)
PATIENT TEMP: 37 C
PCO2: 46.6 MMHG (ref 35–45)
PDW BLD-RTO: 19.1 FL (ref 11.5–15)
PH BLOOD GAS: 7.39 (ref 7.35–7.45)
PHOSPHORUS: 2.4 MG/DL (ref 2.5–4.5)
PLATELET # BLD: 154 E9/L (ref 130–450)
PMV BLD AUTO: 12 FL (ref 7–12)
PO2: 104.6 MMHG (ref 75–100)
POTASSIUM SERPL-SCNC: 3.9 MMOL/L (ref 3.5–5)
PRO-BNP: ABNORMAL PG/ML (ref 0–125)
PROTHROMBIN TIME: 12.3 SEC (ref 9.3–12.4)
RBC # BLD: 3.65 E12/L (ref 3.5–5.5)
REASON FOR REJECTION: NORMAL
REJECTED TEST: NORMAL
SALICYLATE, SERUM: <0.3 MG/DL (ref 0–30)
SARS-COV-2, NAAT: NOT DETECTED
SEDIMENTATION RATE, ERYTHROCYTE: 30 MM/HR (ref 0–20)
SODIUM BLD-SCNC: 137 MMOL/L (ref 132–146)
SOURCE, BLOOD GAS: ABNORMAL
THB: 11 G/DL (ref 11.5–16.5)
TIME ANALYZED: 2139
TOTAL PROTEIN: 6.7 G/DL (ref 6.4–8.3)
TRICYCLIC ANTIDEPRESSANTS SCREEN SERUM: NEGATIVE NG/ML
TROPONIN, HIGH SENSITIVITY: 185 NG/L (ref 0–9)
WBC # BLD: 12.2 E9/L (ref 4.5–11.5)

## 2022-06-15 PROCEDURE — 85651 RBC SED RATE NONAUTOMATED: CPT

## 2022-06-15 PROCEDURE — 83735 ASSAY OF MAGNESIUM: CPT

## 2022-06-15 PROCEDURE — 87635 SARS-COV-2 COVID-19 AMP PRB: CPT

## 2022-06-15 PROCEDURE — 71045 X-RAY EXAM CHEST 1 VIEW: CPT

## 2022-06-15 PROCEDURE — 85610 PROTHROMBIN TIME: CPT

## 2022-06-15 PROCEDURE — 82728 ASSAY OF FERRITIN: CPT

## 2022-06-15 PROCEDURE — 82140 ASSAY OF AMMONIA: CPT

## 2022-06-15 PROCEDURE — 70450 CT HEAD/BRAIN W/O DYE: CPT

## 2022-06-15 PROCEDURE — 80179 DRUG ASSAY SALICYLATE: CPT

## 2022-06-15 PROCEDURE — 83880 ASSAY OF NATRIURETIC PEPTIDE: CPT

## 2022-06-15 PROCEDURE — 86850 RBC ANTIBODY SCREEN: CPT

## 2022-06-15 PROCEDURE — 6360000002 HC RX W HCPCS

## 2022-06-15 PROCEDURE — 94640 AIRWAY INHALATION TREATMENT: CPT

## 2022-06-15 PROCEDURE — 83930 ASSAY OF BLOOD OSMOLALITY: CPT

## 2022-06-15 PROCEDURE — 2580000003 HC RX 258

## 2022-06-15 PROCEDURE — 84484 ASSAY OF TROPONIN QUANT: CPT

## 2022-06-15 PROCEDURE — 36415 COLL VENOUS BLD VENIPUNCTURE: CPT

## 2022-06-15 PROCEDURE — 2700000000 HC OXYGEN THERAPY PER DAY

## 2022-06-15 PROCEDURE — 2580000003 HC RX 258: Performed by: FAMILY MEDICINE

## 2022-06-15 PROCEDURE — 80307 DRUG TEST PRSMV CHEM ANLYZR: CPT

## 2022-06-15 PROCEDURE — 71250 CT THORAX DX C-: CPT

## 2022-06-15 PROCEDURE — 93005 ELECTROCARDIOGRAM TRACING: CPT | Performed by: EMERGENCY MEDICINE

## 2022-06-15 PROCEDURE — 86901 BLOOD TYPING SEROLOGIC RH(D): CPT

## 2022-06-15 PROCEDURE — 82077 ASSAY SPEC XCP UR&BREATH IA: CPT

## 2022-06-15 PROCEDURE — 99285 EMERGENCY DEPT VISIT HI MDM: CPT

## 2022-06-15 PROCEDURE — 86900 BLOOD TYPING SEROLOGIC ABO: CPT

## 2022-06-15 PROCEDURE — 87502 INFLUENZA DNA AMP PROBE: CPT

## 2022-06-15 PROCEDURE — 94664 DEMO&/EVAL PT USE INHALER: CPT

## 2022-06-15 PROCEDURE — 83550 IRON BINDING TEST: CPT

## 2022-06-15 PROCEDURE — 83540 ASSAY OF IRON: CPT

## 2022-06-15 PROCEDURE — 85025 COMPLETE CBC W/AUTO DIFF WBC: CPT

## 2022-06-15 PROCEDURE — 2060000000 HC ICU INTERMEDIATE R&B

## 2022-06-15 PROCEDURE — 80143 DRUG ASSAY ACETAMINOPHEN: CPT

## 2022-06-15 PROCEDURE — 6370000000 HC RX 637 (ALT 250 FOR IP): Performed by: EMERGENCY MEDICINE

## 2022-06-15 PROCEDURE — 82805 BLOOD GASES W/O2 SATURATION: CPT

## 2022-06-15 PROCEDURE — 87040 BLOOD CULTURE FOR BACTERIA: CPT

## 2022-06-15 PROCEDURE — 84100 ASSAY OF PHOSPHORUS: CPT

## 2022-06-15 PROCEDURE — 80053 COMPREHEN METABOLIC PANEL: CPT

## 2022-06-15 PROCEDURE — 83605 ASSAY OF LACTIC ACID: CPT

## 2022-06-15 RX ORDER — BRIMONIDINE TARTRATE 2 MG/ML
1 SOLUTION/ DROPS OPHTHALMIC 2 TIMES DAILY
Status: DISCONTINUED | OUTPATIENT
Start: 2022-06-15 | End: 2022-06-29 | Stop reason: HOSPADM

## 2022-06-15 RX ORDER — SODIUM CHLORIDE 0.9 % (FLUSH) 0.9 %
5-40 SYRINGE (ML) INJECTION EVERY 12 HOURS SCHEDULED
Status: DISCONTINUED | OUTPATIENT
Start: 2022-06-15 | End: 2022-06-29 | Stop reason: HOSPADM

## 2022-06-15 RX ORDER — ACETAMINOPHEN 650 MG/1
650 SUPPOSITORY RECTAL EVERY 6 HOURS PRN
Status: DISCONTINUED | OUTPATIENT
Start: 2022-06-15 | End: 2022-06-29 | Stop reason: HOSPADM

## 2022-06-15 RX ORDER — ASPIRIN 81 MG/1
81 TABLET, CHEWABLE ORAL DAILY
Status: DISCONTINUED | OUTPATIENT
Start: 2022-06-16 | End: 2022-06-29 | Stop reason: HOSPADM

## 2022-06-15 RX ORDER — METOPROLOL SUCCINATE 25 MG/1
25 TABLET, EXTENDED RELEASE ORAL DAILY
Status: DISCONTINUED | OUTPATIENT
Start: 2022-06-16 | End: 2022-06-29 | Stop reason: HOSPADM

## 2022-06-15 RX ORDER — ONDANSETRON 4 MG/1
4 TABLET, ORALLY DISINTEGRATING ORAL EVERY 8 HOURS PRN
Status: DISCONTINUED | OUTPATIENT
Start: 2022-06-15 | End: 2022-06-29 | Stop reason: HOSPADM

## 2022-06-15 RX ORDER — PANTOPRAZOLE SODIUM 20 MG/1
20 TABLET, DELAYED RELEASE ORAL
Status: DISCONTINUED | OUTPATIENT
Start: 2022-06-16 | End: 2022-06-29 | Stop reason: HOSPADM

## 2022-06-15 RX ORDER — BRIMONIDINE TARTRATE, TIMOLOL MALEATE 2; 5 MG/ML; MG/ML
1 SOLUTION/ DROPS OPHTHALMIC EVERY 12 HOURS
Status: DISCONTINUED | OUTPATIENT
Start: 2022-06-15 | End: 2022-06-15 | Stop reason: CLARIF

## 2022-06-15 RX ORDER — POLYETHYLENE GLYCOL 3350 17 G/17G
17 POWDER, FOR SOLUTION ORAL DAILY PRN
Status: DISCONTINUED | OUTPATIENT
Start: 2022-06-15 | End: 2022-06-29 | Stop reason: HOSPADM

## 2022-06-15 RX ORDER — TIMOLOL MALEATE 5 MG/ML
1 SOLUTION/ DROPS OPHTHALMIC 2 TIMES DAILY
Status: DISCONTINUED | OUTPATIENT
Start: 2022-06-15 | End: 2022-06-29 | Stop reason: HOSPADM

## 2022-06-15 RX ORDER — ISOSORBIDE MONONITRATE 30 MG/1
30 TABLET, EXTENDED RELEASE ORAL DAILY
Status: DISCONTINUED | OUTPATIENT
Start: 2022-06-16 | End: 2022-06-29 | Stop reason: HOSPADM

## 2022-06-15 RX ORDER — IPRATROPIUM BROMIDE AND ALBUTEROL SULFATE 2.5; .5 MG/3ML; MG/3ML
1 SOLUTION RESPIRATORY (INHALATION)
Status: COMPLETED | OUTPATIENT
Start: 2022-06-15 | End: 2022-06-15

## 2022-06-15 RX ORDER — LANTHANUM CARBONATE 500 MG/1
1000 TABLET, CHEWABLE ORAL
Status: DISCONTINUED | OUTPATIENT
Start: 2022-06-16 | End: 2022-06-29 | Stop reason: HOSPADM

## 2022-06-15 RX ORDER — LATANOPROST 50 UG/ML
1 SOLUTION/ DROPS OPHTHALMIC NIGHTLY
Status: DISCONTINUED | OUTPATIENT
Start: 2022-06-15 | End: 2022-06-29 | Stop reason: HOSPADM

## 2022-06-15 RX ORDER — ONDANSETRON 2 MG/ML
4 INJECTION INTRAMUSCULAR; INTRAVENOUS EVERY 6 HOURS PRN
Status: DISCONTINUED | OUTPATIENT
Start: 2022-06-15 | End: 2022-06-29 | Stop reason: HOSPADM

## 2022-06-15 RX ORDER — SODIUM CHLORIDE 9 MG/ML
INJECTION, SOLUTION INTRAVENOUS PRN
Status: DISCONTINUED | OUTPATIENT
Start: 2022-06-15 | End: 2022-06-29 | Stop reason: HOSPADM

## 2022-06-15 RX ORDER — SODIUM CHLORIDE 0.9 % (FLUSH) 0.9 %
5-40 SYRINGE (ML) INJECTION PRN
Status: DISCONTINUED | OUTPATIENT
Start: 2022-06-15 | End: 2022-06-29 | Stop reason: HOSPADM

## 2022-06-15 RX ORDER — HEPARIN SODIUM 10000 [USP'U]/ML
5000 INJECTION, SOLUTION INTRAVENOUS; SUBCUTANEOUS 3 TIMES DAILY
Status: DISCONTINUED | OUTPATIENT
Start: 2022-06-16 | End: 2022-06-29 | Stop reason: HOSPADM

## 2022-06-15 RX ORDER — ACETAMINOPHEN 325 MG/1
650 TABLET ORAL EVERY 6 HOURS PRN
Status: DISCONTINUED | OUTPATIENT
Start: 2022-06-15 | End: 2022-06-29 | Stop reason: HOSPADM

## 2022-06-15 RX ORDER — DEXTROSE MONOHYDRATE 50 MG/ML
100 INJECTION, SOLUTION INTRAVENOUS PRN
Status: DISCONTINUED | OUTPATIENT
Start: 2022-06-15 | End: 2022-06-29 | Stop reason: HOSPADM

## 2022-06-15 RX ORDER — IPRATROPIUM BROMIDE AND ALBUTEROL SULFATE 2.5; .5 MG/3ML; MG/3ML
1 SOLUTION RESPIRATORY (INHALATION) EVERY 4 HOURS PRN
Status: DISCONTINUED | OUTPATIENT
Start: 2022-06-15 | End: 2022-06-16

## 2022-06-15 RX ORDER — INSULIN GLARGINE 100 [IU]/ML
5 INJECTION, SOLUTION SUBCUTANEOUS NIGHTLY
Status: DISCONTINUED | OUTPATIENT
Start: 2022-06-15 | End: 2022-06-15

## 2022-06-15 RX ADMIN — SODIUM CHLORIDE, PRESERVATIVE FREE 10 ML: 5 INJECTION INTRAVENOUS at 23:40

## 2022-06-15 RX ADMIN — IPRATROPIUM BROMIDE AND ALBUTEROL SULFATE 1 AMPULE: .5; 3 SOLUTION RESPIRATORY (INHALATION) at 20:57

## 2022-06-15 RX ADMIN — IPRATROPIUM BROMIDE AND ALBUTEROL SULFATE 1 AMPULE: .5; 3 SOLUTION RESPIRATORY (INHALATION) at 20:55

## 2022-06-15 RX ADMIN — PIPERACILLIN AND TAZOBACTAM 4500 MG: 4; .5 INJECTION, POWDER, LYOPHILIZED, FOR SOLUTION INTRAVENOUS at 21:47

## 2022-06-15 RX ADMIN — IPRATROPIUM BROMIDE AND ALBUTEROL SULFATE 1 AMPULE: .5; 3 SOLUTION RESPIRATORY (INHALATION) at 20:56

## 2022-06-15 ASSESSMENT — ENCOUNTER SYMPTOMS
CONSTIPATION: 1
SHORTNESS OF BREATH: 0
DIARRHEA: 0
NAUSEA: 0
EYE PAIN: 0
RHINORRHEA: 0
VOMITING: 0
BLOOD IN STOOL: 0
COUGH: 1
ABDOMINAL PAIN: 1
WHEEZING: 0
SORE THROAT: 0

## 2022-06-15 NOTE — ED NOTES
This RN received report from [facility], [address] [place] [address].      Giuseppe Craven RN  06/15/22 1921

## 2022-06-16 ENCOUNTER — APPOINTMENT (OUTPATIENT)
Dept: GENERAL RADIOLOGY | Age: 67
DRG: 177 | End: 2022-06-16
Payer: COMMERCIAL

## 2022-06-16 PROBLEM — R09.02 HYPOXIA: Status: ACTIVE | Noted: 2022-06-16

## 2022-06-16 PROBLEM — E87.5 HYPERKALEMIA, DIMINISHED RENAL EXCRETION: Status: RESOLVED | Noted: 2017-11-09 | Resolved: 2022-06-16

## 2022-06-16 PROBLEM — I47.20 VENTRICULAR TACHYCARDIA: Status: RESOLVED | Noted: 2021-05-24 | Resolved: 2022-06-16

## 2022-06-16 PROBLEM — J18.9 HEALTHCARE-ASSOCIATED PNEUMONIA: Status: ACTIVE | Noted: 2022-06-16

## 2022-06-16 LAB
ADENOVIRUS BY PCR: NOT DETECTED
ANION GAP SERPL CALCULATED.3IONS-SCNC: 11 MMOL/L (ref 7–16)
BASOPHILS ABSOLUTE: 0.03 E9/L (ref 0–0.2)
BASOPHILS RELATIVE PERCENT: 0.4 % (ref 0–2)
BORDETELLA PARAPERTUSSIS BY PCR: NOT DETECTED
BORDETELLA PERTUSSIS BY PCR: NOT DETECTED
BUN BLDV-MCNC: 14 MG/DL (ref 6–23)
CALCIUM SERPL-MCNC: 8.9 MG/DL (ref 8.6–10.2)
CHLAMYDOPHILIA PNEUMONIAE BY PCR: NOT DETECTED
CHLORIDE BLD-SCNC: 98 MMOL/L (ref 98–107)
CO2: 27 MMOL/L (ref 22–29)
CORONAVIRUS 229E BY PCR: NOT DETECTED
CORONAVIRUS HKU1 BY PCR: NOT DETECTED
CORONAVIRUS NL63 BY PCR: NOT DETECTED
CORONAVIRUS OC43 BY PCR: NOT DETECTED
CREAT SERPL-MCNC: 3 MG/DL (ref 0.5–1)
EKG ATRIAL RATE: 69 BPM
EKG ATRIAL RATE: 69 BPM
EKG P AXIS: 33 DEGREES
EKG P AXIS: 39 DEGREES
EKG P-R INTERVAL: 174 MS
EKG P-R INTERVAL: 188 MS
EKG Q-T INTERVAL: 386 MS
EKG Q-T INTERVAL: 400 MS
EKG QRS DURATION: 90 MS
EKG QRS DURATION: 92 MS
EKG QTC CALCULATION (BAZETT): 413 MS
EKG QTC CALCULATION (BAZETT): 428 MS
EKG R AXIS: 23 DEGREES
EKG T AXIS: -22 DEGREES
EKG T AXIS: 9 DEGREES
EKG VENTRICULAR RATE: 69 BPM
EKG VENTRICULAR RATE: 69 BPM
EOSINOPHILS ABSOLUTE: 0.02 E9/L (ref 0.05–0.5)
EOSINOPHILS RELATIVE PERCENT: 0.2 % (ref 0–6)
FERRITIN: 2221 NG/ML
GFR AFRICAN AMERICAN: 19
GFR NON-AFRICAN AMERICAN: 19 ML/MIN/1.73
GLUCOSE BLD-MCNC: 91 MG/DL (ref 74–99)
HCT VFR BLD CALC: 31.6 % (ref 34–48)
HEMOGLOBIN: 9.6 G/DL (ref 11.5–15.5)
HUMAN METAPNEUMOVIRUS BY PCR: NOT DETECTED
HUMAN RHINOVIRUS/ENTEROVIRUS BY PCR: NOT DETECTED
IMMATURE GRANULOCYTES #: 0.02 E9/L
IMMATURE GRANULOCYTES %: 0.2 % (ref 0–5)
INFLUENZA A BY PCR: NOT DETECTED
INFLUENZA B BY PCR: NOT DETECTED
IRON SATURATION: 22 % (ref 15–50)
IRON: 31 MCG/DL (ref 37–145)
LYMPHOCYTES ABSOLUTE: 1.22 E9/L (ref 1.5–4)
LYMPHOCYTES RELATIVE PERCENT: 14.6 % (ref 20–42)
MAGNESIUM: 2.4 MG/DL (ref 1.6–2.6)
MCH RBC QN AUTO: 28.5 PG (ref 26–35)
MCHC RBC AUTO-ENTMCNC: 30.4 % (ref 32–34.5)
MCV RBC AUTO: 93.8 FL (ref 80–99.9)
METER GLUCOSE: 100 MG/DL (ref 74–99)
METER GLUCOSE: 129 MG/DL (ref 74–99)
METER GLUCOSE: 151 MG/DL (ref 74–99)
METER GLUCOSE: 84 MG/DL (ref 74–99)
METER GLUCOSE: 95 MG/DL (ref 74–99)
MONOCYTES ABSOLUTE: 0.88 E9/L (ref 0.1–0.95)
MONOCYTES RELATIVE PERCENT: 10.5 % (ref 2–12)
MYCOPLASMA PNEUMONIAE BY PCR: NOT DETECTED
NEUTROPHILS ABSOLUTE: 6.18 E9/L (ref 1.8–7.3)
NEUTROPHILS RELATIVE PERCENT: 74.1 % (ref 43–80)
PARAINFLUENZA VIRUS 1 BY PCR: NOT DETECTED
PARAINFLUENZA VIRUS 2 BY PCR: NOT DETECTED
PARAINFLUENZA VIRUS 3 BY PCR: NOT DETECTED
PARAINFLUENZA VIRUS 4 BY PCR: NOT DETECTED
PDW BLD-RTO: 18.9 FL (ref 11.5–15)
PHOSPHORUS: 2.6 MG/DL (ref 2.5–4.5)
PLATELET # BLD: 143 E9/L (ref 130–450)
PMV BLD AUTO: 11.8 FL (ref 7–12)
POTASSIUM REFLEX MAGNESIUM: 3.9 MMOL/L (ref 3.5–5)
RBC # BLD: 3.37 E12/L (ref 3.5–5.5)
RESPIRATORY SYNCYTIAL VIRUS BY PCR: NOT DETECTED
SARS-COV-2, PCR: DETECTED
SODIUM BLD-SCNC: 136 MMOL/L (ref 132–146)
TOTAL IRON BINDING CAPACITY: 139 MCG/DL (ref 250–450)
TROPONIN, HIGH SENSITIVITY: 231 NG/L (ref 0–9)
WBC # BLD: 8.4 E9/L (ref 4.5–11.5)

## 2022-06-16 PROCEDURE — 84100 ASSAY OF PHOSPHORUS: CPT

## 2022-06-16 PROCEDURE — 83735 ASSAY OF MAGNESIUM: CPT

## 2022-06-16 PROCEDURE — 99223 1ST HOSP IP/OBS HIGH 75: CPT | Performed by: INTERNAL MEDICINE

## 2022-06-16 PROCEDURE — 87070 CULTURE OTHR SPECIMN AEROBIC: CPT

## 2022-06-16 PROCEDURE — 2060000000 HC ICU INTERMEDIATE R&B

## 2022-06-16 PROCEDURE — 6370000000 HC RX 637 (ALT 250 FOR IP): Performed by: SPECIALIST

## 2022-06-16 PROCEDURE — 2700000000 HC OXYGEN THERAPY PER DAY

## 2022-06-16 PROCEDURE — 6370000000 HC RX 637 (ALT 250 FOR IP): Performed by: FAMILY MEDICINE

## 2022-06-16 PROCEDURE — 74230 X-RAY XM SWLNG FUNCJ C+: CPT

## 2022-06-16 PROCEDURE — 99222 1ST HOSP IP/OBS MODERATE 55: CPT | Performed by: FAMILY MEDICINE

## 2022-06-16 PROCEDURE — 92526 ORAL FUNCTION THERAPY: CPT | Performed by: SPEECH-LANGUAGE PATHOLOGIST

## 2022-06-16 PROCEDURE — 6360000002 HC RX W HCPCS: Performed by: FAMILY MEDICINE

## 2022-06-16 PROCEDURE — 36415 COLL VENOUS BLD VENIPUNCTURE: CPT

## 2022-06-16 PROCEDURE — 94640 AIRWAY INHALATION TREATMENT: CPT

## 2022-06-16 PROCEDURE — 93005 ELECTROCARDIOGRAM TRACING: CPT | Performed by: FAMILY MEDICINE

## 2022-06-16 PROCEDURE — 85025 COMPLETE CBC W/AUTO DIFF WBC: CPT

## 2022-06-16 PROCEDURE — 92611 MOTION FLUOROSCOPY/SWALLOW: CPT | Performed by: SPEECH-LANGUAGE PATHOLOGIST

## 2022-06-16 PROCEDURE — 82962 GLUCOSE BLOOD TEST: CPT

## 2022-06-16 PROCEDURE — 2500000003 HC RX 250 WO HCPCS: Performed by: RADIOLOGY

## 2022-06-16 PROCEDURE — APPSS60 APP SPLIT SHARED TIME 46-60 MINUTES: Performed by: PHYSICIAN ASSISTANT

## 2022-06-16 PROCEDURE — 94668 MNPJ CHEST WALL SBSQ: CPT

## 2022-06-16 PROCEDURE — 87206 SMEAR FLUORESCENT/ACID STAI: CPT

## 2022-06-16 PROCEDURE — 0202U NFCT DS 22 TRGT SARS-COV-2: CPT

## 2022-06-16 PROCEDURE — 80048 BASIC METABOLIC PNL TOTAL CA: CPT

## 2022-06-16 PROCEDURE — 94667 MNPJ CHEST WALL 1ST: CPT

## 2022-06-16 PROCEDURE — 2580000003 HC RX 258: Performed by: FAMILY MEDICINE

## 2022-06-16 RX ORDER — MIDODRINE HYDROCHLORIDE 5 MG/1
5 TABLET ORAL DAILY PRN
Status: DISCONTINUED | OUTPATIENT
Start: 2022-06-16 | End: 2022-06-29 | Stop reason: HOSPADM

## 2022-06-16 RX ORDER — DOXYCYCLINE HYCLATE 100 MG/1
100 CAPSULE ORAL EVERY 12 HOURS SCHEDULED
Status: DISCONTINUED | OUTPATIENT
Start: 2022-06-16 | End: 2022-06-16

## 2022-06-16 RX ORDER — BUMETANIDE 1 MG/1
2 TABLET ORAL
Status: DISCONTINUED | OUTPATIENT
Start: 2022-06-18 | End: 2022-06-16 | Stop reason: DRUGHIGH

## 2022-06-16 RX ORDER — DOXYCYCLINE HYCLATE 100 MG/1
100 CAPSULE ORAL EVERY 12 HOURS SCHEDULED
Status: DISCONTINUED | OUTPATIENT
Start: 2022-06-16 | End: 2022-06-29 | Stop reason: HOSPADM

## 2022-06-16 RX ORDER — IPRATROPIUM BROMIDE AND ALBUTEROL SULFATE 2.5; .5 MG/3ML; MG/3ML
1 SOLUTION RESPIRATORY (INHALATION) 4 TIMES DAILY
Status: DISCONTINUED | OUTPATIENT
Start: 2022-06-16 | End: 2022-06-29 | Stop reason: HOSPADM

## 2022-06-16 RX ORDER — BUMETANIDE 1 MG/1
2 TABLET ORAL
Status: DISCONTINUED | OUTPATIENT
Start: 2022-06-18 | End: 2022-06-28

## 2022-06-16 RX ORDER — BUMETANIDE 1 MG/1
2 TABLET ORAL ONCE
Status: COMPLETED | OUTPATIENT
Start: 2022-06-16 | End: 2022-06-16

## 2022-06-16 RX ADMIN — BRIMONIDINE TARTRATE 1 DROP: 2 SOLUTION/ DROPS OPHTHALMIC at 12:32

## 2022-06-16 RX ADMIN — BARIUM SULFATE 10 ML: 400 PASTE ORAL at 14:07

## 2022-06-16 RX ADMIN — LANTHANUM CARBONATE 1000 MG: 500 TABLET, CHEWABLE ORAL at 18:44

## 2022-06-16 RX ADMIN — MIDODRINE HYDROCHLORIDE 5 MG: 5 TABLET ORAL at 09:34

## 2022-06-16 RX ADMIN — METOPROLOL SUCCINATE 25 MG: 25 TABLET, FILM COATED, EXTENDED RELEASE ORAL at 09:34

## 2022-06-16 RX ADMIN — HEPARIN SODIUM 5000 UNITS: 10000 INJECTION INTRAVENOUS; SUBCUTANEOUS at 09:46

## 2022-06-16 RX ADMIN — IPRATROPIUM BROMIDE AND ALBUTEROL SULFATE 1 AMPULE: .5; 2.5 SOLUTION RESPIRATORY (INHALATION) at 20:25

## 2022-06-16 RX ADMIN — PIPERACILLIN AND TAZOBACTAM 4500 MG: 4; .5 INJECTION, POWDER, LYOPHILIZED, FOR SOLUTION INTRAVENOUS at 09:40

## 2022-06-16 RX ADMIN — SODIUM CHLORIDE, PRESERVATIVE FREE 10 ML: 5 INJECTION INTRAVENOUS at 09:44

## 2022-06-16 RX ADMIN — BARIUM SULFATE 70 G: 0.81 POWDER, FOR SUSPENSION ORAL at 14:07

## 2022-06-16 RX ADMIN — TICAGRELOR 90 MG: 90 TABLET ORAL at 09:35

## 2022-06-16 RX ADMIN — HEPARIN SODIUM 5000 UNITS: 10000 INJECTION INTRAVENOUS; SUBCUTANEOUS at 20:36

## 2022-06-16 RX ADMIN — IPRATROPIUM BROMIDE AND ALBUTEROL SULFATE 1 AMPULE: .5; 2.5 SOLUTION RESPIRATORY (INHALATION) at 12:24

## 2022-06-16 RX ADMIN — BARIUM SULFATE 120 ML: 400 SUSPENSION ORAL at 14:07

## 2022-06-16 RX ADMIN — ASPIRIN 81 MG CHEWABLE TABLET 81 MG: 81 TABLET CHEWABLE at 09:34

## 2022-06-16 RX ADMIN — PANTOPRAZOLE SODIUM 20 MG: 20 TABLET, DELAYED RELEASE ORAL at 06:04

## 2022-06-16 RX ADMIN — TIMOLOL MALEATE 1 DROP: 5 SOLUTION OPHTHALMIC at 12:32

## 2022-06-16 RX ADMIN — LATANOPROST 1 DROP: 50 SOLUTION OPHTHALMIC at 20:36

## 2022-06-16 RX ADMIN — TICAGRELOR 90 MG: 90 TABLET ORAL at 20:36

## 2022-06-16 RX ADMIN — DOXYCYCLINE HYCLATE 100 MG: 100 CAPSULE ORAL at 12:31

## 2022-06-16 RX ADMIN — BRIMONIDINE TARTRATE 1 DROP: 2 SOLUTION/ DROPS OPHTHALMIC at 20:35

## 2022-06-16 RX ADMIN — DOXYCYCLINE HYCLATE 100 MG: 100 CAPSULE ORAL at 20:36

## 2022-06-16 RX ADMIN — BUMETANIDE 2 MG: 1 TABLET ORAL at 09:34

## 2022-06-16 RX ADMIN — TIMOLOL MALEATE 1 DROP: 5 SOLUTION OPHTHALMIC at 20:36

## 2022-06-16 RX ADMIN — PIPERACILLIN AND TAZOBACTAM 4500 MG: 4; .5 INJECTION, POWDER, LYOPHILIZED, FOR SOLUTION INTRAVENOUS at 20:47

## 2022-06-16 RX ADMIN — HEPARIN SODIUM 5000 UNITS: 10000 INJECTION INTRAVENOUS; SUBCUTANEOUS at 16:27

## 2022-06-16 RX ADMIN — SODIUM CHLORIDE, PRESERVATIVE FREE 10 ML: 5 INJECTION INTRAVENOUS at 20:37

## 2022-06-16 RX ADMIN — IPRATROPIUM BROMIDE AND ALBUTEROL SULFATE 1 AMPULE: .5; 2.5 SOLUTION RESPIRATORY (INHALATION) at 15:20

## 2022-06-16 ASSESSMENT — PAIN SCALES - GENERAL
PAINLEVEL_OUTOF10: 0
PAINLEVEL_OUTOF10: 0

## 2022-06-16 NOTE — H&P
[address]  HISTORY AND PHYSICAL             Date: 6/16/2022        Patient Name: Daryle Staggers     YOB: 1955      Age:  78 y.o. Chief Complaint     Chief Complaint   Patient presents with    Shortness of Breath     for a few days per pt    Altered Mental Status     per NH staff, pt able to answer all orientation questions           History Obtained From   sibling, chart review and the patient    History of Present Illness   Daryle Staggers is a 78 y.o. female with PMHx of chronic osteomyelitis in her left hand, end-stage renal disease on hemodialysis, CAD with recent NSTEMI 1 month ago, CHF, hypertension now with intermittent hypotension, type 2 diabetes who presented with altered mental status and cough. Per patient's family present in the room, patient became lethargic and was not herself earlier today, prompting presentation. Upon exam, patient is alert and oriented x3. Patient states that about 1 week ago she developed a cough after being around people in her dialysis sessions who were coughing. She also developed some epigastric abdominal pain around the same time. Her cough is productive of gray mucus. She denies fevers, chills, difficulty breathing, nausea, vomiting, diarrhea, dysuria. Patient states she does not use oxygen at home. Patient does admit to pain in her chest when she coughs, as well as chronic constipation with bowel movements about once a week. Patient also mentions that she has a bone infection in her left hand for which she gets infusions of antibiotics 3 times a week after her dialysis. ED Course   In the ED, patient was found to be hypoxic to 81% with improvement to 100% on 2 L nasal cannula. She was noted to be afebrile.   Labs remarkable for creatinine 2.5, potassium 3.9, lactic acid 1.0, troponin of 185 with delta pending, normal ammonia level, leukocytosis of 12.2, anemia with hemoglobin 10.3, negative for flu and COVID, CT chest showing right and left lower lobe infiltrate, and CT head without acute abnormality. EKG without sign of STEMI or other acute changes. Patient received 1 dose of Zosyn and DuoNebs in the ED. Decision was made to admit the patient. Past Medical History     Past Medical History:   Diagnosis Date    Acute infection of bone ([address]     infection of rt foot, resolved.     Acute osteomyelitis of phalanx of left hand ([address] 1/27/2022    Left third distal phalanx    Anemia of chronic disease     Arthritis     Breast cancer ([address]     right breast, 2008/ bladder, 2006- last chemotherapy \"years ago\"    CAD (coronary artery disease)     Carpal tunnel syndrome     bilat - for OR left hand 3-17-20     Chronic diastolic CHF (congestive heart failure) ([address] 09/23/2014 9/23/14- echocardiogram revealed moderate LV concentric hypertrophy, stage III diastolic dysfunction, mild tricuspid regurgitation    CKD (chronic kidney disease) stage 4, GFR 15-29 ml/min ([facility])     Diabetic retinopathy ([address]     Glaucoma     Hemodialysis patient ([address]     [facility] wed fri- Dr. Mandy Lopes - left arm fistula     Hyperkalemia, diminished renal excretion 11/9/2017    Hyperlipidemia     Hypertension     Hypoglycemia unawareness in type 1 diabetes mellitus ([address] 11/7/2017    Insulin dependent type 2 diabetes mellitus ([address]     Neuropathy     feet    Osteomyelitis due to secondary diabetes ([address]     rt great toe with amputation    Patient is Restorationist 11/7/2017    Refusal of blood product     patient states she dose not take blood transfusion    Ventricular hypertrophy     Vitreous hemorrhage ([address]     left eye        Past Surgical History     Past Surgical History:   Procedure Laterality Date    AMPUTATION      right great toe    ANKLE SURGERY      correction on charcot joint of right ankle    BONE BIOPSY N/A 04/18/2022    BONE BIOPSY PERCUTANEOUS L1/L2 performed by Chiara Hester MD at [address] [place] [address]  CARDIAC CATHETERIZATION  12/09/2021    Dr Rafael Nieves Left 03/17/2020    LEFT CARPAL TUNNEL RELEASE performed by Claudette Mark MD at [address]      bilateral    CHOLECYSTECTOMY      COLONOSCOPY      CORONARY ANGIOPLASTY  05/05/2022    Dr. Giovanni Burgos - RCA angioplasty   Khalif Caseyharjit Left 01/31/2018    upper arm/Dr. Abundio Wilder    ECHO COMPL W DOP COLOR FLOW  02/14/2013         ECHO COMPLETE  09/17/2013         FINGER AMPUTATION Left 01/20/2022    AMPUTATION OF LEFT THIRD DIGIT, DISTAL PHALANX performed by Qiana Roberson MD at [address]      right    OTHER SURGICAL HISTORY  09/27/2011    PPV, membranectomy, laser Right eye    OTHER SURGICAL HISTORY  insertion lumbar drain insertion    10/12/`14    OTHER SURGICAL HISTORY  10/22/2015    percutaneous lead placement for spinal cord stimulator    OTHER SURGICAL HISTORY  11/03/2015    Spinal; cord stimulator- turned off as of 3-10-20     TN AV ANAST,UP ARM BASILIC VEIN TRANSPOSIT Left 05/15/2018    TRANSPOSITION STAGE II AV FISTULA - LEFT UPPER ARM performed by Barry Milan MD at [address] [facility] ANGIOACCESS AV FISTULA Left 09/25/2018    SUPERFICIALIZATION AV FISTULA - LEFT ARM performed by Barry Milan MD at [address] METH Left 04/10/2018    PARS PLANA VITRECTOMY 25 GAUGE RETINAL DETACHMENT REPAIR air fluid exchange, endolaser performed by Shabnam Kwong MD at [address] TRANSESOPHAGEAL ECHOCARDIOGRAM  11/11/2021    Dr. Nathaniel Medina TRANSESOPHAGEAL ECHOCARDIOGRAM  04/19/2022        TUNNELED VENOUS CATHETER PLACEMENT  11/15/2017    VITRECTOMY Left 04/10/2018    PARS PLANA VITRECTOMY; RETINAL DETACHMENT REPAIR; GAS BUBBLE; LASER LEFT EYE        Medications Prior to Admission     Prior to Admission medications    Medication Sig Start Date End Date Taking?  Authorizing Provider   gabapentin (NEURONTIN) 100 MG capsule Take 2 capsules by mouth 3 times daily for 180 days.  3/24/22 9/20/22 Yes Saida Winkler MD   atorvastatin (LIPITOR) 80 MG tablet Take 80 mg by mouth nightly   Yes Historical Provider, MD   bumetanide (BUMEX) 2 MG tablet Take 2 mg by mouth three times a week Given Sunday,Tuesday,Thursday   Yes Historical Provider, MD   isosorbide mononitrate (IMDUR) 30 MG extended release tablet Take 30 mg by mouth daily   Yes Historical Provider, MD   insulin glargine (LANTUS) 100 UNIT/ML injection vial Inject 5 Units into the skin nightly    Yes Historical Provider, MD   metoprolol succinate (TOPROL XL) 25 MG extended release tablet Take 1 tablet by mouth daily 11/24/21  Yes Grey Montes De Oca MD   lanthanum (FOSRENOL) 1000 MG chewable tablet Take 1,000 mg by mouth 3 times daily (with meals)  8/9/21  Yes Historical Provider, MD   allopurinol (ZYLOPRIM) 100 MG tablet Take 1 tablet by mouth daily 9/7/21  Yes Grey Montes De Oca MD   ticagrelor (BRILINTA) 90 MG TABS tablet Take 1 tablet by mouth 2 times daily 9/7/21  Yes Grey Montes De Oca MD   B Complex-C-Folic Acid (BONI CAPS) 1 MG CAPS Take 1 mg by mouth nightly    Yes Historical Provider, MD   omeprazole (PRILOSEC) 20 MG delayed release capsule Take 20 mg by mouth daily as needed    Yes Historical Provider, MD MCHUGH 0.01 % SOLN ophthalmic drops Place 1 drop into the right eye nightly  6/9/20  Yes Historical Provider, MD   COMBIGAN 0.2-0.5 % ophthalmic solution Place 1 drop into the right eye every 12 hours  8/1/19  Yes Historical Provider, MD   aspirin 81 MG EC tablet Take 1 tablet by mouth daily 5/9/22 6/8/22  Saravanan Bustillo MD   midodrine (PROAMATINE) 5 MG tablet Take 5 mg by mouth daily as needed    Historical Provider, MD   lidocaine-prilocaine (EMLA) 2.5-2.5 % cream Apply topically as needed for Pain     Historical Provider, MD        Allergies   Furosemide    Social History     Social History     Tobacco History Smoking Status  Never Smoker    Smokeless Tobacco Use  Never Used          Alcohol History     Alcohol Use Status  No          Drug Use     Drug Use Status  No          Sexual Activity     Sexually Active  Not Currently                Family History     Family History   Problem Relation Age of Onset   Zoey Day Breast Cancer Mother 61    Hypertension Mother     Heart Disease Father     Prostate Cancer Father     Breast Cancer Maternal Grandmother 61       Review of Systems   Review of Systems   Constitutional: Negative for chills and fever. HENT: Negative for congestion, rhinorrhea and sore throat. Eyes: Negative for pain and visual disturbance. Respiratory: Positive for cough. Negative for shortness of breath and wheezing. Cardiovascular: Negative for chest pain and palpitations. Gastrointestinal: Positive for abdominal pain and constipation. Negative for blood in stool, diarrhea, nausea and vomiting. Genitourinary: Negative for dysuria and hematuria. Musculoskeletal: Negative for arthralgias and myalgias. Skin: Positive for wound (PowerPort right upper chest). Negative for rash. Neurological: Negative for dizziness and headaches. Psychiatric/Behavioral: Positive for confusion. Physical Exam   BP (!) 113/57   Pulse 71   Temp 99.3 °F (37.4 °C) (Axillary)   Resp 17   SpO2 100%     Physical Exam  Vitals and nursing note reviewed. Constitutional:       General: She is not in acute distress. Appearance: Normal appearance. HENT:      Head: Normocephalic and atraumatic. Nose: Nose normal. No rhinorrhea. Mouth/Throat:      Mouth: Mucous membranes are moist.      Pharynx: Oropharynx is clear. Eyes:      General: No scleral icterus. Right eye: No discharge. Left eye: No discharge. Cardiovascular:      Rate and Rhythm: Normal rate and regular rhythm. Heart sounds: Murmur (II/VI systolic) heard.        Pulmonary:      Effort: Pulmonary effort is normal. No respiratory distress. Breath sounds: Rhonchi (RLL, LLL) present. No wheezing. Comments: On 2L O2 at time of exam  Abdominal:      General: Bowel sounds are normal.      Palpations: Abdomen is soft. There is no mass. Tenderness: There is abdominal tenderness (epigastric, mild). Musculoskeletal:      Cervical back: No rigidity or tenderness. Right lower leg: No edema. Left lower leg: No edema. Skin:     General: Skin is warm and dry. Findings: Lesion (Power Port over right upper chest with plastic exposed under broken skin without sign of acute infection, no drainage or erythema present (see below)) present. No erythema or rash. Neurological:      Mental Status: She is alert and oriented to person, place, and time.       Comments: Patient hard of hearing but fully oriented and alert     PowerPort right upper chest:         Labs      Recent Results (from the past 24 hour(s))   Lactic Acid    Collection Time: 06/15/22  6:53 PM   Result Value Ref Range    Lactic Acid 1.0 0.5 - 2.2 mmol/L   COVID-19, Rapid    Collection Time: 06/15/22  6:53 PM    Specimen: Nasopharyngeal Swab   Result Value Ref Range    SARS-CoV-2, NAAT Not Detected Not Detected   RAPID INFLUENZA A/B ANTIGENS    Collection Time: 06/15/22  6:53 PM    Specimen: Nasopharyngeal   Result Value Ref Range    Influenza A by PCR Not Detected Not Detected    Influenza B by PCR Not Detected Not Detected   Ammonia    Collection Time: 06/15/22  6:53 PM   Result Value Ref Range    Ammonia 10.1 (L) 11.0 - 51.0 umol/L   EKG 12 Lead    Collection Time: 06/15/22  6:56 PM   Result Value Ref Range    Ventricular Rate 69 BPM    Atrial Rate 69 BPM    P-R Interval 188 ms    QRS Duration 92 ms    Q-T Interval 400 ms    QTc Calculation (Bazett) 428 ms    P Axis 39 degrees    R Axis 23 degrees    T Axis 9 degrees   CBC with Auto Differential    Collection Time: 06/15/22  7:14 PM   Result Value Ref Range    WBC 12.2 (H) 4.5 - 11.5 E9/L    RBC 3.65 3.50 - 5.50 E12/L    Hemoglobin 10.3 (L) 11.5 - 15.5 g/dL    Hematocrit 34.2 34.0 - 48.0 %    MCV 93.7 80.0 - 99.9 fL    MCH 28.2 26.0 - 35.0 pg    MCHC 30.1 (L) 32.0 - 34.5 %    RDW 19.1 (H) 11.5 - 15.0 fL    Platelets 325 760 - 388 E9/L    MPV 12.0 7.0 - 12.0 fL    Neutrophils % 86.5 (H) 43.0 - 80.0 %    Immature Granulocytes % 0.3 0.0 - 5.0 %    Lymphocytes % 7.1 (L) 20.0 - 42.0 %    Monocytes % 6.0 2.0 - 12.0 %    Eosinophils % 0.0 0.0 - 6.0 %    Basophils % 0.1 0.0 - 2.0 %    Neutrophils Absolute 10.53 (H) 1.80 - 7.30 E9/L    Immature Granulocytes # 0.04 E9/L    Lymphocytes Absolute 0.87 (L) 1.50 - 4.00 E9/L    Monocytes Absolute 0.73 0.10 - 0.95 E9/L    Eosinophils Absolute 0.00 (L) 0.05 - 0.50 E9/L    Basophils Absolute 0.01 0.00 - 0.20 E9/L   Protime-INR    Collection Time: 06/15/22  7:14 PM   Result Value Ref Range    Protime 12.3 9.3 - 12.4 sec    INR 1.1    Sedimentation Rate    Collection Time: 06/15/22  7:14 PM   Result Value Ref Range    Sed Rate 30 (H) 0 - 20 mm/Hr   Serum Drug Screen    Collection Time: 06/15/22  7:14 PM   Result Value Ref Range    Ethanol Lvl <10 mg/dL    Acetaminophen Level <5.0 (L) 10.0 - 81.5 mcg/mL    Salicylate, Serum <6.2 0.0 - 30.0 mg/dL    TCA Scrn NEGATIVE Cutoff:300 ng/mL   Osmolality, Serum    Collection Time: 06/15/22  7:14 PM   Result Value Ref Range    Osmolality 287 285 - 310 mOsm/Kg   TYPE AND SCREEN    Collection Time: 06/15/22  7:25 PM   Result Value Ref Range    ABO/Rh O POS     Antibody Screen NEG    SPECIMEN REJECTION    Collection Time: 06/15/22  8:13 PM   Result Value Ref Range    Rejected Test TRP/BMPX/LIVER/     Reason for Rejection see below    Lactate, Sepsis    Collection Time: 06/15/22  8:22 PM   Result Value Ref Range    Lactic Acid, Sepsis 0.8 0.5 - 1.9 mmol/L   Comprehensive Metabolic Panel    Collection Time: 06/15/22  8:22 PM   Result Value Ref Range    Sodium 137 132 - 146 mmol/L    Potassium 3.9 3.5 - 5.0 mmol/L    Chloride 99 98 - 107 mmol/L CO2 25 22 - 29 mmol/L    Anion Gap 13 7 - 16 mmol/L    Glucose 93 74 - 99 mg/dL    BUN 12 6 - 23 mg/dL    CREATININE 2.5 (H) 0.5 - 1.0 mg/dL    GFR Non-African American 23 >=60 mL/min/1.73    GFR African American 23     Calcium 8.9 8.6 - 10.2 mg/dL    Total Protein 6.7 6.4 - 8.3 g/dL    Albumin 3.5 3.5 - 5.2 g/dL    Total Bilirubin 0.7 0.0 - 1.2 mg/dL    Alkaline Phosphatase 155 (H) 35 - 104 U/L    ALT 16 0 - 32 U/L    AST 19 0 - 31 U/L   Troponin    Collection Time: 06/15/22  8:22 PM   Result Value Ref Range    Troponin, High Sensitivity 185 (H) 0 - 9 ng/L   Magnesium    Collection Time: 06/15/22  8:22 PM   Result Value Ref Range    Magnesium 2.2 1.6 - 2.6 mg/dL   Brain Natriuretic Peptide    Collection Time: 06/15/22  8:22 PM   Result Value Ref Range    Pro-BNP >70,000 (H) 0 - 125 pg/mL   Phosphorus    Collection Time: 06/15/22  8:22 PM   Result Value Ref Range    Phosphorus 2.4 (L) 2.5 - 4.5 mg/dL   Blood Gas, Arterial    Collection Time: 06/15/22  9:39 PM   Result Value Ref Range    Date Analyzed [id number]     Time Analyzed 213     Source: Blood Arterial     pH, Blood Gas 7.390 7.350 - 7.450    PCO2 46.6 (H) 35.0 - 45.0 mmHg    PO2 104.6 (H) 75.0 - 100.0 mmHg    HCO3 27.6 (H) 22.0 - 26.0 mmol/L    B.E. 2.1 -3.0 - 3.0 mmol/L    O2 Sat 98.6 (H) 92.0 - 98.5 %    O2Hb 96.6 94.0 - 97.0 %    COHb 1.7 (H) 0.0 - 1.5 %    MetHb 0.3 0.0 - 1.5 %    O2 Content 15.1 mL/dL    HHb 1.4 0.0 - 5.0 %    tHb (est) 11.0 (L) 11.5 - 16.5 g/dL    Mode 8L breathing tx     Date Of Collection      Time Collected      Pt Temp 37.0 C     ID [id number]     Lab [id number]     Critical(s) Notified .  No Critical Values         Imaging/Diagnostics Last 24 Hours   CT HEAD WO CONTRAST    Result Date: 6/15/2022  EXAMINATION: CT OF THE HEAD WITHOUT CONTRAST  6/15/2022 7:39 pm TECHNIQUE: CT of the head was performed without the administration of intravenous contrast. Automated exposure control, iterative reconstruction, and/or weight based adjustment of the mA/kV was utilized to reduce the radiation dose to as low as reasonably achievable. COMPARISON: 03/08/2022 HISTORY: ORDERING SYSTEM PROVIDED HISTORY: ams TECHNOLOGIST PROVIDED HISTORY: Reason for exam:->ams Has a \"code stroke\" or \"stroke alert\" been called? ->No Decision Support Exception - unselect if not a suspected or confirmed emergency medical condition->Emergency Medical Condition (MA) FINDINGS: BRAIN/VENTRICLES: There is no acute intracranial hemorrhage, mass effect or midline shift. No abnormal extra-axial fluid collection. The gray-white differentiation is maintained without evidence of an acute infarct. There is prominence of the ventricles and sulci due to global parenchymal volume loss. There are nonspecific areas of hypoattenuation within the periventricular and subcortical white matter, which likely represent chronic microvascular ischemic change. ORBITS: The visualized portion of the orbits demonstrate no acute abnormality. SINUSES: The visualized paranasal sinuses and mastoid air cells demonstrate no acute abnormality. There is a small mucous retention cyst or polyp in the right maxillary sinus. SOFT TISSUES/SKULL: No acute abnormality of the visualized skull or soft tissues. No acute intracranial abnormality. CT CHEST WO CONTRAST    Result Date: 6/15/2022  EXAMINATION: CT OF THE CHEST WITHOUT CONTRAST 6/15/2022 7:39 pm TECHNIQUE: CT of the chest was performed without the administration of intravenous contrast. Multiplanar reformatted images are provided for review. Automated exposure control, iterative reconstruction, and/or weight based adjustment of the mA/kV was utilized to reduce the radiation dose to as low as reasonably achievable.  COMPARISON: May 2, 2022 HISTORY: ORDERING SYSTEM PROVIDED HISTORY: shortness of breath, ams TECHNOLOGIST PROVIDED HISTORY: Reason for exam:->shortness of breath, ams Decision Support Exception - unselect if not a suspected or confirmed emergency medical condition->Emergency Medical Condition (MA) FINDINGS: Patchy ground-glass and irregular consolidations seen throughout right lung notable in right lower lobe. Additional ground-glass and interstitial opacities are present in left lower lobe. No pleural effusion. There is prominence of the pulmonary vasculature. Moderate cardiomegaly. No pericardial effusion. Atherosclerotic calcifications are associated with the coronary arteries. There are multiple prominent mediastinal lymph nodes appearing similar in size and number. No pneumothorax. View of the upper abdomen shows grossly normal bilateral adrenal glands. Stable chronic unhealed fracture involving body of the sternum. Fluid collection seen anterior to right glenohumeral joint related to synovial effusion or bursitis. Severe degenerative changes involving right glenohumeral joint. Stable heterogeneous appearance of the thyroid gland. There is generalized body wall edema. 1. New ground-glass and airspace consolidations throughout right lung notable in right lower lobe as well as minimal opacities in left lower lobe suggesting interstitial pulmonary edema or bilateral pneumonia. 2. Cardiomegaly with pulmonary vascular congestion. 3. Multiple stable prominent mediastinal lymph nodes. XR CHEST PORTABLE    Result Date: 6/15/2022  EXAMINATION: ONE XRAY VIEW OF THE CHEST 6/15/2022 6:58 pm COMPARISON: None. HISTORY: ORDERING SYSTEM PROVIDED HISTORY: short of breath TECHNOLOGIST PROVIDED HISTORY: Reason for exam:->short of breath FINDINGS: The lungs are without acute focal process. There is no effusion or pneumothorax. The cardiomediastinal silhouette is without acute process. The osseous structures are without acute process. Right-sided port a catheter tip just distal to the caval atrial junction. No acute process.        Assessment      Hospital Problems           Last Modified POA    * (Principal) AMS (altered mental status) 6/15/2022 Yes Plan     AMS likely secondary to Bilateral Lower Lobe CAP with hypoxia  Altered today, per sibling. Hypoxic to 81% documented in chart in ED. Placed on 2L O2 with recover to 98%. CT chest with bilateral lower lobe infiltrates. Received zosyn, duoneb in ED. NOT on home O2. Continue zosyn 4.5g every 12 hours, renally dosed for CAP  Add doxycycline 100mg every 12 hours  DuoNebs every 4 hours as needed  Check blood, urine cultures  Check UA, urine drug screen  Check phosphorus  Telemetry  CBC, BMP daily  Wean O2 as tolerated  Consult ID given chronic Vancomycin MWF for osteomyelitis, see below    Chronic Osteomyelitis of Left Third Digit on Hand  Stable. On IV antibiotic therapy after dialysis MWF with Vancomycin 750mg three times per week after dialysis for 6 weeks, currently in week 5. Consult ID for further recommendations and management    Exposed Power Port  Without signs of infection; see above photo. Has been used for her three times weekly infusions of vancomycin for osteomyelitis. Also used in ED. Asymptomatic at this time. Check blood cultures  Consult ID    ESRD on HD MWF  Follows with Dr. Kayleen Perez. Dialysis MWF. Cr in ED 2.5, baseline about 3 but varies widely. Creatinine Clearance calculated to be 28ml/min on admission. Continue home fosrenol 1g three times daily  Home Bumex held pending Nephrology evaluation  BMP, magnesium, phosphorus daily  Potassium and phosphate limited diet  Nephrology consulted from ED    CAD with recent NSTEMI 1 month ago  With chest pain attributed to frequent coughing; troponin 185 in ED which is below baseline, EKG without STEMI. Continue home metoprolol 25mg daily, Imdur 30mg daily, Brilinta 90mg BID  Recheck troponin for delta    CHF  Does not appear fluid overloaded at this time. Last ECHO with EF 40-45%, dilated LA.   Continue home metoprolol 25mg daily, Imdur 30mg daily  Hold home bumex until seen by Nephrology  Check BNP   Daily weights  Strict I&O  Monitor for signs of fluid overload     History of Hypertension, now with intermittent Hypotension  Hypertensive in ED. Has midodrine 5mg daily as needed at home, held upon admission  Monitor Blood Pressure     Diabetes Mellitus Type 2, Insulin Dependent  Last A1C 5.1 on 5/4/22. Glucose 93 in ED. Hold home gabapentin for diabetic neuropathy due to AMS, home lantus 5u nightly due to appropriate blood sugars  POCT glucose checks  Hypoglycemia protocol    Anemia  Hemoglobin 10.3 in ED, has been diagnosed with Anemia of Chronic Disease but no iron studies in chart since 2017. Check iron studies  Daily CBC    Glaucoma  Continue home combigan drop, xalatan drop    Consultations Ordered:  IP CONSULT TO FAMILY MEDICINE  IP CONSULT TO NEPHROLOGY  IP CONSULT TO INFECTIOUS DISEASES    DVT ppx: unfractionated heparin  GI ppx: NA  Diet: Carb Control and renal  Code: Full    Case discussed with attending physician Dr. Chula Pozo.     Electronically signed by Jeff Thomas DO on 6/15/22 at 9:28 PM EDT

## 2022-06-16 NOTE — CONSULTS
and LLL suggestive of edema vs PNA. Cardiomegaly with vascular congestion. Prominent mediastinal lymph nodes. She states that she recently was diagnosed with COVID at the nursing facility and had been coughing very vigorously. She states that she was having pain with her cough and shortness of breath. She denies any chest pain or palpitations. Please note: past medical records were reviewed per electronic medical record (EMR) - see detailed reports under Past Medical/ Surgical History. Past Medical History:    1. Jehovah Witness  2. 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 3. HTN  4. HLD  5. BMI 27.1 on 5/19/2021  6. Anemia  7. Bladder carcinoma s/p chemotherapy  8. Right breast cancer with mastectomy  2005 followed by radiation and chemotherapy, Arimidex. Chronic  lymphedema  RUE. Stephania Forts states became a therapy because of multiple back issues and weakened her bones,  port in her right upper chest  9. [facility] admission with SOB and chest discomfort 08/2011.  Cardiac enzymes negative.  CT angiogram of the chest negative for PE and dissection.  EKG:  NSR, nonspecific ST T abnormalities.    10. KPM 90/7999 with severe concentric LVH, normal wall motion.  LAE, RVE, Stage I diastolic dysfunction. 11. MPS 08/2011 with small area of lateral ischemia, normal wall motion and EF.    12. Cardiac catheterization 08/30/2012:  CX 90% mid stenosis, OM2 totally occluded.  LAD 20% stenosis, RVA 50% stenosis, LV normal.  She was treated medically.    13. [facility] admission 02/13/2013 with SOB, orthopnea, nonproductive nocturnal cough and edema.  CXR:  cardiomegaly and bilateral effusion.  EKG:  NSR, LAE, low voltage, PVC, poor R wave progression.  BUN 23, Cr 1.5, BNP 1868.  Cardiac enzymes negative.  Hemoglobin 9.9, WBC 5.4.  Rx with aggressive diuretic therapy.    14. Echo 02/14/2013: [address], normal LV systolic function, Stage II diastolic dysfunction, LAE, normal RVSP, no valvulopathy.    15. Lifetime non-smoker. 16. NKDA.  She had worsening renal function in the past when taking lasix. 17. Echo, 07/17/2013. Mild concentric LVH with normal systolic function. RVSP 53 mmHg. Otherwise normal study. 18. [facility] admission with worsening back pain and inability to walk on 09/22/2014 associated with mild SOB. CXR: Pulmonary vascular congestion. EKG: NSR, possible old inferior MI. Electrolytes normal, BUN=30, Cr=2.0, AKB=52972, Hb=11.1, WBC=5.9, cardiac enzymes negative. 19. Echo, 09/23/2014. Moderate CLVH, normal wall motion, Stage II diastolic dysfunction, RVE, OFELIA, RVSP=50 mmHg. 20. Surgery, Dr. Clarisa Banegas, 09/25/2014. Decompressive lumbar laminectomy L2-L5 with fusion L3-4 and L4-5. 21. Insertion intrathecal drain, 10/04/2014 for CSF leak, removed several days later, but reinserted 10/12/2014 for recurrent leak. 22. Implant spinal cord stimulator, Penta lead from [facility] by thoracic laminotomy, T10.   Implant IPG Protege in the right buttock  23. TTE 11/2017, EF 55%.  Mild LVH.  Stage II diastolic dysfunction.  Moderate to severe left atrial dilatation.  Mild MR.  RVSP 76.  24. 2017 ESRD dialysis initiated  MWF   25. Abdominal CT 2017: Extensive anasarca.  Extensive arteriosclerotic disease. 26. Surgical history: Correction of Charcot joint right, carpal tunnel release 3/2020, cholecystectomy, dialysis fistula creation, mastectomy, laser treatment of eyes, spinal cord stimulator, surgery for retinal detachment.  Spinal cord decompression L2.  27. 11/2017 Insertion of right internal jugular vein tunneled hemodialysis catheter fluoroscopy Removal of right femoral temporary hemodialysis catheter  28. 1/31/2018 Creation of L brachiocephalic AVF  29. 5/73/4208 NSZBRSYNAWTFM of left upper extremity brachiobasilic  fistula, stage II  30. 8/18/7391 [id number] of L basilic AVF  31. 90/94/9083  TTE: EF 60-65%. Moderate CLVH. No VHD. 32. Ambulates with walker  33.  Completed COVID Vaccine  34. 2D echocardiogram [facility] December 8, 2020 EF 60 to 83% and diastolic dysfunction with moderate concentric left ventricular hypertrophy and moderate MAC and aortic valve sclerosis  35. Hospital admission May 2021 with a reported shockable rhythm by AED and received 1 defibrillation and had reported ventricular tachycardia in the emergency room and was started on a lidocaine drip, elevated troponin but no acute ischemic EKG changes, started on aspirin and Lipitor resumed and beta-blockers and nitrates initiated  36. Lexiscan stress test May 19, 2021, abnormal with a large fixed defect in the apical and septal wall small fixed defect in the inferior apical wall partially reversible defect in the small area of the anterior lateral wall and EF 25% with apical septal akinesis and moderate global hypokinesis and dilated left ventricle during stress and rest and was a high risk study  37. Left heart cath May 2021 she underwent stenting to the LAD and RCA. CONCLUSION: Coronary artery disease: Left main:  20% eccentric mid vessel narrowing. LAD:  50% proximal vessel narrowing and 95% mid vessel stenosis. Trivial diagonal branch with 90% stenosis. LCX:  Occluded after a small caliber first marginal branch which is a chronic total occlusion.  The second larger marginal branch filled late. RCA:  Large, dominant vessel with 90% heavily calcified mid vessel stenosis.  50% disease at the level of the crux and 70% ostial stenosis of the posterior descending artery branch. Markedly elevated left ventricular end-diastolic pressure. Successful balloon angioplasty with deployment of drug-eluting coronary stent to the mid LAD with very good results. Successful balloon angioplasty with deployment of drug-eluting coronary stent to the mid RCA with poststent deployment dilatation with a larger high-pressure noncompliant balloon with good results.   38. Lasix allergy with questionable renal failure   39. 2D echocardiogram on May 21, 2021 left ventricle is dilated There is apical, septal and basal inferior wall severe hypokinesis to akinesis Ejection fraction is visually estimated at 30+/-5%. There is doppler evidence of stage III diastolic dysfunction. Normal right ventricular size. Right ventricle global systolic function is mildly reduced . The left atrium is moderately dilated. Severe posterior mitral annular calcification. Focal calcification mitral valve leaflets. Chordal calcification is present. A mobile well defined 1.0 cm by 0.5 cm echo density is noted on the ventricular aspect of the mitral valve suggestive of a fibroelastoma: clinical correlation is needed. No mitral stenosis. Mild mitral regurgitation. Mild to moderate tricuspid regurgitation. Moderate pulmonary hypertension. Aortic root is sclerotic and calcified.   40. 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 6 cm x .8cm  41. KIARA November 11, 2021 for mitral leaflet mass Normal LV function, normal RV function, no hemodynamically significant valve disease(mobile posterior leaflet echodensity with leaflet restriction and mild MR), no evidence of vegetations, no left atrial or left atrial appendage thrombus (no evidence of spontaneous echo contrast), no intraatrial shunting on bubble study, no significant atherosclerosis of the aorta  42. Left heart cath December 9, 2021 ANGIOGRAPHIC FINDINGS: The left main coronary artery arises normally from the left sinus of Valsalva.  It is a large vessel without any disease. Hollace Gondola is mild distal calcification.  It bifurcates into left anterior descending artery and left circumflex artery. The left anterior descending artery has moderate-to-severe proximal and ostial calcifications.  There is 20% ostial disease.  The rest of the vessel is mildly diffusely diseased.  There is patent stent in the mid segment.  The LAD gives off a few small diagonal branches.  The second one is totally occluded.  The rest are mildly diseased. angioplasty (pre-PCI LATOSHA-3 flow, post-PCI LATOSHA-3 flow, pre-PCI stenosis 90%, post-PCI stenosis less than 10%). 2.  Patent stent in the LAD. 3.  Totally occluded left circumflex artery. Medications Prior to admit:  Prior to Admission medications    Medication Sig Start Date End Date Taking? Authorizing Provider   gabapentin (NEURONTIN) 100 MG capsule Take 2 capsules by mouth 3 times daily for 180 days.  3/24/22 9/20/22 Yes Dahiana Grajeda MD   atorvastatin (LIPITOR) 80 MG tablet Take 80 mg by mouth nightly   Yes Historical Provider, MD   bumetanide (BUMEX) 2 MG tablet Take 2 mg by mouth three times a week Given Sunday,Tuesday,Thursday   Yes Historical Provider, MD   isosorbide mononitrate (IMDUR) 30 MG extended release tablet Take 30 mg by mouth daily   Yes Historical Provider, MD   insulin glargine (LANTUS) 100 UNIT/ML injection vial Inject 5 Units into the skin nightly    Yes Historical Provider, MD   metoprolol succinate (TOPROL XL) 25 MG extended release tablet Take 1 tablet by mouth daily 11/24/21  Yes Manish Scott MD   lanthanum (FOSRENOL) 1000 MG chewable tablet Take 1,000 mg by mouth 3 times daily (with meals)  8/9/21  Yes Historical Provider, MD   allopurinol (ZYLOPRIM) 100 MG tablet Take 1 tablet by mouth daily 9/7/21  Yes Manish Scott MD   ticagrelor (BRILINTA) 90 MG TABS tablet Take 1 tablet by mouth 2 times daily 9/7/21  Yes Manish Scott MD   B Complex-C-Folic Acid (BONI CAPS) 1 MG CAPS Take 1 mg by mouth nightly    Yes Historical Provider, MD   omeprazole (PRILOSEC) 20 MG delayed release capsule Take 20 mg by mouth daily as needed    Yes Historical Provider, MD MCHUGH 0.01 % SOLN ophthalmic drops Place 1 drop into the right eye nightly  6/9/20  Yes Historical Provider, MD   COMBIGAN 0.2-0.5 % ophthalmic solution Place 1 drop into the right eye every 12 hours  8/1/19  Yes Historical Provider, MD   aspirin 81 MG EC tablet Take 1 tablet by mouth daily 5/9/22 6/8/22 Ivy Hale MD   midodrine (PROAMATINE) 5 MG tablet Take 5 mg by mouth daily as needed    Historical Provider, MD   lidocaine-prilocaine (EMLA) 2.5-2.5 % cream Apply topically as needed for Pain     Historical Provider, MD       Current Medications:    Current Facility-Administered Medications: midodrine (PROAMATINE) tablet 5 mg, 5 mg, Oral, Daily PRN  bumetanide (BUMEX) tablet 2 mg, 2 mg, Oral, Once  isosorbide mononitrate (IMDUR) extended release tablet 30 mg, 30 mg, Oral, Daily  latanoprost (XALATAN) 0.005 % ophthalmic solution 1 drop, 1 drop, Right Eye, Nightly  metoprolol succinate (TOPROL XL) extended release tablet 25 mg, 25 mg, Oral, Daily  pantoprazole (PROTONIX) tablet 20 mg, 20 mg, Oral, QAM AC  ticagrelor (BRILINTA) tablet 90 mg, 90 mg, Oral, BID  sodium chloride flush 0.9 % injection 5-40 mL, 5-40 mL, IntraVENous, 2 times per day  sodium chloride flush 0.9 % injection 5-40 mL, 5-40 mL, IntraVENous, PRN  0.9 % sodium chloride infusion, , IntraVENous, PRN  ondansetron (ZOFRAN-ODT) disintegrating tablet 4 mg, 4 mg, Oral, Q8H PRN **OR** ondansetron (ZOFRAN) injection 4 mg, 4 mg, IntraVENous, Q6H PRN  polyethylene glycol (GLYCOLAX) packet 17 g, 17 g, Oral, Daily PRN  lanthanum (FOSRENOL) chewable tablet 1,000 mg, 1,000 mg, Oral, TID WC  acetaminophen (TYLENOL) tablet 650 mg, 650 mg, Oral, Q6H PRN **OR** acetaminophen (TYLENOL) suppository 650 mg, 650 mg, Rectal, Q6H PRN  heparin (porcine) injection 5,000 Units, 5,000 Units, SubCUTAneous, TID  piperacillin-tazobactam (ZOSYN) 4,500 mg in dextrose 5 % 50 mL IVPB (mini-bag), 4,500 mg, IntraVENous, Q12H  glucose chewable tablet 16 g, 4 tablet, Oral, PRN  dextrose bolus 10% 125 mL, 125 mL, IntraVENous, PRN **OR** dextrose bolus 10% 250 mL, 250 mL, IntraVENous, PRN  glucagon (rDNA) injection 1 mg, 1 mg, IntraMUSCular, PRN  dextrose 5 % solution, 100 mL/hr, IntraVENous, PRN  brimonidine (ALPHAGAN) 0.2 % ophthalmic solution 1 drop, 1 drop, Right Eye, BID  timolol (TIMOPTIC) 0.5 % ophthalmic solution 1 drop, 1 drop, Right Eye, BID  ipratropium-albuterol (DUONEB) nebulizer solution 1 ampule, 1 ampule, Inhalation, Q4H PRN  aspirin chewable tablet 81 mg, 81 mg, Oral, Daily    Allergies:  Furosemide    Social History:    Currently a resident at [facility] for IV antibiotics and rehab. Limited ambulation with assistance and walker. Does not require supplemental O2. Denies tobacco, alcohol or illicit drug use  Full code     Family History: This information was not obtained at this time as it is found noncontributory secondary to the patients advanced age. REVIEW OF SYSTEMS:     · Constitutional: Denies fevers, chills, night sweats, and fatigue  · HEENT: Denies headaches, nose bleeds, and blurred vision,oral pain, abscess or lesion. · Musculoskeletal: Denies falls, pain to BLE with ambulation and edema to BLE. · Neurological: Denies dizziness and lightheadedness, numbness and tingling  · Cardiovascular: Denies chest pain, palpitations, and feelings of heart racing. · Respiratory: Denies orthopnea and PND, cough, FRENCH  · Gastrointestinal: Denies heartburn, nausea/vomiting, diarrhea and constipation, black/bloody, and tarry stools. · Genitourinary: Denies dysuria and hematuria  · Hematologic: Denies excessive bruising or bleeding  · Lymphatic: Denies lumps and bumps to neck, axilla, breast, and groin      PHYSICAL EXAM:   BP (!) 108/59   Pulse 69   Temp 98.9 °F (37.2 °C) (Axillary)   Resp 17   Ht 5' 10\" (1.778 m)   Wt 159 lb (72.1 kg)   SpO2 97%   BMI 22.81 kg/m²   CONST:  Well developed, AA female who appears older than her stated age. Awake, alert, cooperative, no apparent distress  HEENT:   Head- Normocephalic, atraumatic   Eyes- Conjunctivae pink, anicteric  Throat- Oral mucosa pink and moist  Neck-  No stridor, trachea midline, no jugular venous distention. CHEST: Chest symmetrical and non-tender to palpation.   Right chest wall port   RESPIRATORY: diminished breath sounds bilaterally   CARDIOVASCULAR:     No carotid bruit noted bilaterally   Heart Ausculation- Regular rate and rhythm, no murmur. PV: No lower extremity edema. No varicosities. ABDOMEN: Soft, non-tender to light palpation. Bowel sounds present. MS: Good muscle strength and tone. No atrophy or abnormal movements. : Deferred   SKIN: Warm and dry no statis dermatitis or ulcers   NEURO / PSYCH: Oriented to person, place and time. Speech clear and appropriate. Follows all commands. Pleasant affect     DATA:    ECG: SR HR 69   Tele strips:  SR HR 70s   Diagnostic:      Labs:   CBC:   Recent Labs     06/15/22  1914 06/16/22  0350   WBC 12.2* 8.4   HGB 10.3* 9.6*   HCT 34.2 31.6*    143     BMP:   Recent Labs     06/15/22  2022 06/16/22  0350    136   K 3.9 3.9   CO2 25 27   BUN 12 14   CREATININE 2.5* 3.0*   LABGLOM 23 19   CALCIUM 8.9 8.9     Mag:   Recent Labs     06/15/22  2022 06/16/22  0350   MG 2.2 2.4     Phos:   Recent Labs     06/15/22  2022 06/16/22  0350   PHOS 2.4* 2.6     HgA1c:   Lab Results   Component Value Date    LABA1C 5.1 05/04/2022     PT/INR:   Recent Labs     06/15/22  1914   PROTIME 12.3   INR 1.1     FASTING LIPID PANEL:  Lab Results   Component Value Date    CHOL 103 05/04/2022    HDL 42 05/04/2022    LDLCALC 48 05/04/2022    TRIG 66 05/04/2022     LIVER PROFILE:  Recent Labs     06/15/22  2022   AST 19   ALT 16   LABALBU 3.5       Assessment:   1. Pneumonia -- on 5 LNC and Abx   2. Chronic myocardial injury, denies CP / no acute ischemic EKG changes. Troponin not consistent with ACS. H/o CAD with LAD and RCA stents 5/2021 and NSTEMI 5/3/2022 s/p balloon angioplasty for RCA in stent restenosis 5/5/22. 3. Ischemic cardiomyopathy / HFimpEF (40-45% on TTE 4/2022, improved from 30+/-5% on TTE 3/2021). Appears euvolemic   4.  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  5. Insulin requiring DM with neuropathy, retinopathy and nephropathy  6. ESRD on hemodialysis  7. Chronic anemia   8. Borderline low BP. H/o HTN  9. HLD  10. Jehovah Witness  11. Bladder carcinoma s/p chemotherapy  12. Osteomyelitis of lumbar spine L1-L3 s/p bone marrow bx and fusion 4/19/2022. H/o  DDD lumbar spine with h/o lumbar laminectomy and spinal cord stimulator with intractable back pain. Recently treated with IV antibiotics. 13. S/p amputation of left hand distal middle finger for osteomyelitis  14. Obesity   15. Clinical deconditioning                          Plan:   · No plans for advanced cardiac testing at this time. · Continue current cardiac medications with holding parameters for borderline low BP   · Care per primary service and other consultants  · Plan is to follow-up with CT surgery as an outpatient once improved from an ID standpoint  · Discussed with Dr Kerline Sullivan  · Cardiology will sign off, please call if needed       Electronically signed by Ariadne Yañez on 6/16/2022 at 9:22 AM     __________________________________________________________________________________________  I independently interviewed and examined the patient. I have reviewed the above documentation completed by the CARRI. Please see my additional contributions to the HPI, physical exam, and assessment / medical decision making. HPI, ROS, PMH, PSH, [address], [place], and medications independently reviewed (agree; see above documentation)    History of Present Illness:  Currently with no chest pain, respiratory distress, or palpitations. SR on EKG and telemetry.     Review of Systems:   Cardiac: As per HPI  General: No fever, chills  Pulmonary: As per HPI  HEENT: No visual disturbances, difficult swallowing  GI: No nausea, vomiting  : No dysuria, hematuria  Endocrine: No thyroid disease or DM  Musculoskeletal: NICHOLS x 4, no focal motor deficits  Skin: Intact, no rashes  Neuro: No headache, seizures  Psych: Currently with no depression, anxiety    Physical Exam:  BP (!) 91/53 Pulse 74   Temp 98.1 °F (36.7 °C) (Oral)   Resp 17   Ht 5' 10\" (1.778 m)   Wt 159 lb (72.1 kg)   SpO2 97%   BMI 22.81 kg/m²   Wt Readings from Last 3 Encounters:   06/15/22 159 lb (72.1 kg)   05/11/22 175 lb 0.7 oz (79.4 kg)   05/02/22 163 lb (73.9 kg)     Appearance: Awake, alert, no acute respiratory distress  Skin: Intact, no rash  Head: Normocephalic, atraumatic  Eyes: EOMI, no conjunctival erythema  ENMT: No pharyngeal erythema, MMM, no rhinorrhea  Neck: Supple, no carotid bruits  Lungs: Decreased BS B/L, no wheezing  Cardiac: Regular rate and rhythm, +S1S2, no murmurs apparent  Abdomen: Soft, nontender, +bowel sounds  Extremities: Moves all extremities x 4, no lower extremity edema  Neurologic: No focal motor deficits apparent, normal mood and affect    Laboratory Tests:  Recent Labs     06/15/22  2022 06/16/22  0350    136   K 3.9 3.9   CL 99 98   CO2 25 27   BUN 12 14   CREATININE 2.5* 3.0*   GLUCOSE 93 91   CALCIUM 8.9 8.9     Lab Results   Component Value Date    MG 2.4 06/16/2022     Recent Labs     06/15/22  2022   ALKPHOS 155*   ALT 16   AST 19   PROT 6.7   BILITOT 0.7   LABALBU 3.5     Recent Labs     06/15/22  1914 06/16/22  0350   WBC 12.2* 8.4   RBC 3.65 3.37*   HGB 10.3* 9.6*   HCT 34.2 31.6*   MCV 93.7 93.8   MCH 28.2 28.5   MCHC 30.1* 30.4*   RDW 19.1* 18.9*    143   MPV 12.0 11.8     Lab Results   Component Value Date    CKTOTAL 58 05/20/2021    CKMB 2.7 05/20/2021    TROPONINI 0.83 (H) 05/20/2021    TROPONINI 0.81 (H) 05/20/2021    TROPONINI 0.82 (H) 05/19/2021     Recent Labs     06/15/22  2022 06/15/22  2350   TROPHS 185* 231*     Lab Results   Component Value Date    TSH 3.120 05/04/2022     Lab Results   Component Value Date    LABA1C 5.1 05/04/2022     No results found for: EAG  Lab Results   Component Value Date    CHOL 103 05/04/2022    CHOL 100 04/15/2022    CHOL 101 05/19/2021     Lab Results   Component Value Date    TRIG 66 05/04/2022    TRIG 107 04/15/2022    TRIG

## 2022-06-16 NOTE — CONSULTS
[address] [place] [address] [facility]    Consultation Note     Admit Date: 6/15/2022  6:13 PM    Reason for Consult:   Vertebral osteomyelitis and chronic osteomyelitis left hand    Attending Physician:  Elizabeth Castillo MD     Chief Complaint: Shortness of breath and change in mental status    HISTORY OF PRESENT ILLNESS:   The patient is a 78 y.o.  women known to the Infectious Diseases service. The patient is admitted with a cough and change in mental status. According to the family she became lethargic and with some not in her usual state of health prompting them to have her evaluated. Patient goes to dialysis 3 times a week. She had been on vancomycin for hardware in the back that was infected with coag negative staph. Now should be on suppressive therapy. She had no fever chills nausea or emesis. Patient has an AV fistula in left upper extremity. Left third distal phalanx osteomyelitis diagnosed in January 2022. Third digit distal phalanx. ID was not on the case at that time. In December 2021 cultures of the left hand grew Morganella and E. coli both very sensitive. CT scan of the chest showed groundglass airspace consolidation in the right lung as well as left cardiomegaly. Labs show that her BUN and creatinine is 12 and 2.5 and white count was 12.2. Her sed rate was 30; and COVID and influenza were negative rapid panel blood. Blood cultures are pending. April 2022: Seen at [facility] by Dr. Lorraine Meier for lumbar spine osteomyelitis with a 4/18/22  bone biopsy coag negative staph. Patient scheduled to be on vancomycin 753 times a week post hemodialysis to end of June 1, 2022. Coag negative staph grew and anaerobic cultures 1 colony that was sensitive to doxycycline. Past Medical History:        Diagnosis Date    Acute infection of bone ([address]     infection of rt foot, resolved.     Acute osteomyelitis of phalanx of left hand ([address] 1/27/2022    Left third distal phalanx    Anemia of chronic disease     Arthritis     Breast cancer ([address]     right breast, 2008/ bladder, 2006- last chemotherapy \"years ago\"    CAD (coronary artery disease)     Carpal tunnel syndrome     bilat - for OR left hand 3-17-20     Chronic diastolic CHF (congestive heart failure) ([address] 09/23/2014 9/23/14- echocardiogram revealed moderate LV concentric hypertrophy, stage III diastolic dysfunction, mild tricuspid regurgitation    CKD (chronic kidney disease) stage 4, GFR 15-29 ml/min ([facility])     Diabetic retinopathy ([address]     Glaucoma     Hemodialysis patient ([address]     [facility]- Dr. Mendoza Pearl - left arm fistula     Hyperkalemia, diminished renal excretion 11/9/2017    Hyperlipidemia     Hypertension     Hypoglycemia unawareness in type 1 diabetes mellitus ([address] 11/7/2017    Insulin dependent type 2 diabetes mellitus ([address]     Neuropathy     feet    Osteomyelitis due to secondary diabetes ([address]     rt great toe with amputation    Patient is Tenriism 11/7/2017    Refusal of blood product     patient states she dose not take blood transfusion    Ventricular hypertrophy     Ventricular tachycardia ([address] 5/24/2021    Vitreous hemorrhage ([address]     left eye     Past Surgical History:        Procedure Laterality Date    AMPUTATION      right great toe    ANKLE SURGERY      correction on charcot joint of right ankle    BONE BIOPSY N/A 04/18/2022    BONE BIOPSY PERCUTANEOUS L1/L2 performed by Maci Ferreira MD at [address]  12/09/2021    Dr Nighat Medina Left 03/17/2020    LEFT CARPAL TUNNEL RELEASE performed by Prince Swan MD at [address]      bilateral    CHOLECYSTECTOMY      COLONOSCOPY      CORONARY ANGIOPLASTY  05/05/2022    Dr. Osmin Thompson - RCA angioplasty    CYSTOSCOPY      DIALYSIS FISTULA CREATION Left 01/31/2018    upper arm/Dr. Ana Tsai    ECHO COMPL W DOP COLOR FLOW  02/14/2013         ECHO COMPLETE  09/17/2013         FINGER AMPUTATION Left 01/20/2022    AMPUTATION OF LEFT THIRD DIGIT, DISTAL PHALANX performed by Renetta Oliver MD at [address]      right    OTHER SURGICAL HISTORY  09/27/2011    PPV, membranectomy, laser Right eye    OTHER SURGICAL HISTORY  insertion lumbar drain insertion    10/12/`14    OTHER SURGICAL HISTORY  10/22/2015    percutaneous lead placement for spinal cord stimulator    OTHER SURGICAL HISTORY  11/03/2015    Spinal; cord stimulator- turned off as of 3-10-20     SD AV ANAST,UP ARM BASILIC VEIN TRANSPOSIT Left 05/15/2018    TRANSPOSITION STAGE II AV FISTULA - LEFT UPPER ARM performed by Alexandr Mcginnis MD at [facility] ANGIOACCESS AV FISTULA Left 09/25/2018    SUPERFICIALIZATION AV FISTULA - LEFT ARM performed by Alexandr Mcginnis MD at [facility] W/VITRECTOMY ANY METH Left 04/10/2018    PARS PLANA VITRECTOMY 25 GAUGE RETINAL DETACHMENT REPAIR air fluid exchange, endolaser performed by Merritt Kearney MD at [address]      L2    TRANSESOPHAGEAL ECHOCARDIOGRAM  11/11/2021    Dr. Hailey Greer TRANSESOPHAGEAL ECHOCARDIOGRAM  04/19/2022        TUNNELED VENOUS CATHETER PLACEMENT  11/15/2017    VITRECTOMY Left 04/10/2018    PARS PLANA VITRECTOMY; RETINAL DETACHMENT REPAIR; GAS BUBBLE; LASER LEFT EYE     Current Medications:   Scheduled Meds:   bumetanide  2 mg Oral Once    isosorbide mononitrate  30 mg Oral Daily    latanoprost  1 drop Right Eye Nightly    metoprolol succinate  25 mg Oral Daily    pantoprazole  20 mg Oral QAM AC    ticagrelor  90 mg Oral BID    sodium chloride flush  5-40 mL IntraVENous 2 times per day    lanthanum  1,000 mg Oral TID WC    heparin (porcine)  5,000 Units SubCUTAneous TID    piperacillin-tazobactam  4,500 mg IntraVENous Q12H    brimonidine  1 drop Right Eye BID    timolol  1 drop Right Eye BID    aspirin 81 mg Oral Daily     Continuous Infusions:   sodium chloride      dextrose       PRN Meds:midodrine, sodium chloride flush, sodium chloride, ondansetron **OR** ondansetron, polyethylene glycol, acetaminophen **OR** acetaminophen, glucose, dextrose bolus **OR** dextrose bolus, glucagon (rDNA), dextrose, ipratropium-albuterol    Allergies:  Furosemide    Social History:   Social History     Socioeconomic History    Marital status: Single     Spouse name: Not on file    Number of children: Not on file    Years of education: Not on file    Highest education level: Not on file   Occupational History    Not on file   Tobacco Use    Smoking status: Never Smoker    Smokeless tobacco: Never Used   Vaping Use    Vaping Use: Never used   Substance and Sexual Activity    Alcohol use: No    Drug use: No    Sexual activity: Not Currently   Other Topics Concern    Not on file   Social History Narrative    Not on file     Social Determinants of Health     Financial Resource Strain:     Difficulty of Paying Living Expenses: Not on file   Food Insecurity:     Worried About Running Out of Food in the Last Year: Not on file    Fior of Food in the Last Year: Not on file   Transportation Needs:     Lack of Transportation (Medical): Not on file    Lack of Transportation (Non-Medical):  Not on file   Physical Activity:     Days of Exercise per Week: Not on file    Minutes of Exercise per Session: Not on file   Stress:     Feeling of Stress : Not on file   Social Connections:     Frequency of Communication with Friends and Family: Not on file    Frequency of Social Gatherings with Friends and Family: Not on file    Attends Alevism Services: Not on file    Active Member of Clubs or Organizations: Not on file    Attends Club or Organization Meetings: Not on file    Marital Status: Not on file   Intimate Partner Violence:     Fear of Current or Ex-Partner: Not on file    Emotionally Abused: Not on file   Tra Zamora Physically Abused: Not on file    Sexually Abused: Not on file   Housing Stability:     Unable to Pay for Housing in the Last Year: Not on file    Number of Places Lived in the Last Year: Not on file    Unstable Housing in the Last Year: Not on file         Family History:       Problem Relation Age of Onset    Breast Cancer Mother 61    Hypertension Mother     Heart Disease Father     Prostate Cancer Father     Breast Cancer Maternal Grandmother 61   . Otherwise non-pertinent to the chief complaint. REVIEW OF SYSTEMS:    CONSTITUTIONAL:  No chills, fevers or night sweats. No loss of weight. EYES:  No double vision or drainage from eyes, ears or throat. HEENT:  No neck stiffness. No dysphagia. No drainage from eyes, ears or throat  RESPIRATORY:  No cough, productive sputum or hemoptysis. CARDIOVASCULAR:  No chest pain, palpitations, orthopnea or dyspnea on exertion. GASTROINTESTINAL:  No nausea, vomiting, diarrhea or constipation or hematochezia   GENITOURINARY:  No frequency burning dysuria or hematuria. INTEGUMENT/BREAST:  No rash;right mastectomy  HEMATOLOGIC/LYMPHATIC:  No lymphadenopathy or blood dyscrasics. ALLERGIC/IMMUNOLOGIC:  No anaphylaxis. ENDOCRINE:  No polyuria or polydipsia or temperature intolerance. MUSCULOSKELETAL:  No myalgia or arthralgia. Full ROM. NEUROLOGICAL:  No focal motor sensory deficit. BEHAVIOR/PSYCH:  No psychosis. PHYSICAL EXAM:    Vitals:    BP (!) 108/59   Pulse 69   Temp 98.9 °F (37.2 °C) (Axillary)   Resp 17   Ht 5' 10\" (1.778 m)   Wt 159 lb (72.1 kg)   SpO2 97%   BMI 22.81 kg/m²   Constitutional: The patient is awake, alert, and oriented. Skin: Warm and dry. No rashes were noted. No jaundice. HEENT: Eyes show round, and reactive pupils. Moist mucous membranes, no ulcerations, no thrush. Neck: Supple to movements. No lymphadenopathy. Chest: No use of accessory muscles to breathe. Symmetrical expansion.  Auscultation reveals positive right-sided crackles, Mediport right chest level; mastectomy on the right  Cardiovascular: S1 and S2 are rhythmic and regular. No murmurs appreciated. Abdomen: Positive bowel sounds to auscultation. Benign to palpation. No masses felt. No hepatosplenomegaly. Genitourinary: Female  Extremities: No clubbing, no cyanosis, no edema except for lymphedema  postmastectomy on the right  Musculoskeletal: Equal and symmetrical with the amputation. Left distal index finger  Neurological: No focal  Lines: peripheral      CBC+dif:  Recent Labs     06/15/22  1914 06/15/22  1914 06/16/22  0350   WBC 12.2*  --  8.4   HGB 10.3*   < > 9.6*   HCT 34.2   < > 31.6*   MCV 93.7   < > 93.8      < > 143   NEUTROABS 10.53*   < > 6.18    < > = values in this interval not displayed.      Lab Results   Component Value Date    CRP 7.7 (H) 04/13/2022    CRP 0.7 (H) 01/20/2022    CRP 1.6 (H) 12/20/2021     Lab Results   Component Value Date    CRPHS 0.7 08/16/2016    CRPHS 2.6 04/05/2016    CRPHS 7.6 (H) 01/28/2015     Lab Results   Component Value Date    SEDRATE 30 (H) 06/15/2022    SEDRATE 81 (H) 04/13/2022    SEDRATE 19 01/20/2022     Lab Results   Component Value Date    ALT 16 06/15/2022    AST 19 06/15/2022    ALKPHOS 155 (H) 06/15/2022    BILITOT 0.7 06/15/2022     Lab Results   Component Value Date     06/16/2022    K 3.9 06/16/2022    CL 98 06/16/2022    CO2 27 06/16/2022    BUN 14 06/16/2022    CREATININE 3.0 06/16/2022    GFRAA 19 06/16/2022    LABGLOM 19 06/16/2022    GLUCOSE 91 06/16/2022    GLUCOSE 46 04/02/2012    PROT 6.7 06/15/2022    LABALBU 3.5 06/15/2022    LABALBU 3.8 04/02/2012    CALCIUM 8.9 06/16/2022    BILITOT 0.7 06/15/2022    ALKPHOS 155 06/15/2022    AST 19 06/15/2022    ALT 16 06/15/2022       Lab Results   Component Value Date    PROTIME 12.3 06/15/2022    PROTIME 10.4 02/15/2012    INR 1.1 06/15/2022       Lab Results   Component Value Date    TSH 3.120 05/04/2022       Lab Results   Component Value Date    COLORU Yellow 11/07/2017    PHUR 5.5 11/07/2017    LABCAST RARE 02/17/2017    WBCUA 1-3 11/07/2017    WBCUA NONE 08/27/2011    RBCUA 0-1 11/07/2017    RBCUA 1-3 02/13/2013    BACTERIA MODERATE 11/07/2017    CLARITYU Clear 11/07/2017    SPECGRAV 1.025 11/07/2017    LEUKOCYTESUR Negative 11/07/2017    UROBILINOGEN 0.2 11/07/2017    BILIRUBINUR Negative 11/07/2017    BILIRUBINUR NEGATIVE 08/27/2011    BLOODU Negative 11/07/2017    GLUCOSEU Negative 11/07/2017    GLUCOSEU NEGATIVE 08/27/2011    AMORPHOUS MODERATE 11/07/2017       No results found for: [id number], BEART, T1NZDVDF, PHART, THGBART, MRJ1OPX, PO2ART, PWH4WDS  Radiology:  CT HEAD WO CONTRAST   Final Result   No acute intracranial abnormality. CT CHEST WO CONTRAST   Final Result   1. New ground-glass and airspace consolidations throughout right lung notable   in right lower lobe as well as minimal opacities in left lower lobe   suggesting interstitial pulmonary edema or bilateral pneumonia. 2. Cardiomegaly with pulmonary vascular congestion. 3. Multiple stable prominent mediastinal lymph nodes. XR CHEST PORTABLE   Final Result   No acute process. Microbiology:  Pending  No results for input(s): BC in the last 72 hours. No results for input(s): ORG in the last 72 hours. No results for input(s): Clarnce Lax in the last 72 hours. No results for input(s): STREPNEUMAGU in the last 72 hours. No results for input(s): LP1UAG in the last 72 hours. No results for input(s): ASO in the last 72 hours. No results for input(s): CULTRESP in the last 72 hours.     Assessment:  · Aspiration pneumonia pneumonia/HCAP  · Vertebral osteomyelitis and infected hardware lumbar spine-suppressive doxycycline ongoing; just finished 6 weeks of Vanco with hemodialysis    Plan:    · Cont Zosyn plus doxycycline  · Respiratory culture  · Swallowing study  · Check cultures  · Baseline ESR, CRP  · Monitor labs  · Will follow with you    Thank you for

## 2022-06-16 NOTE — PROGRESS NOTES
[facility] [address]  Progress Note    Chief complaint :  Chief Complaint   Patient presents with    Shortness of Breath     for a few days per pt    Altered Mental Status     per NH staff, pt able to answer all orientation questions       Subjective:    No overnight problems. Patient resting comfortably with 2L NC. Patient states that her coughing has improved. Patient describes feeling better. Patient denies chest pain, nausea, vomiting, fever, chills. Patient is tolerating diet. As per nurse, patient is doing well, waxing and waning mentation overnight patient was able to have appropriate bowel movement and vitals have been stable. Past medical, surgical, family and social history were reviewed, non-contributory, and unchanged unless otherwise stated. Objective:  BP (!) 130/55   Pulse 63   Temp 98.3 °F (36.8 °C) (Oral)   Resp 18   Ht 5' 10\" (1.778 m)   Wt 162 lb 4.8 oz (73.6 kg)   SpO2 98%   BMI 23.29 kg/m²   Vital signs reviewed     Physical Exam  Vitals and nursing note reviewed. Constitutional:       Appearance: Normal appearance. HENT:      Head: Normocephalic and atraumatic. Nose: Nose normal. No congestion or rhinorrhea. Mouth/Throat:      Mouth: Mucous membranes are moist.      Pharynx: Oropharynx is clear. Eyes:      General: No scleral icterus. Conjunctiva/sclera: Conjunctivae normal.   Neck:      Vascular: No carotid bruit. Cardiovascular:      Rate and Rhythm: Normal rate and regular rhythm. Heart sounds: Murmur heard. No gallop. Pulmonary:      Breath sounds: Rales present. No wheezing. Comments: Increased effort, requiring 2L of NC   Abdominal:      General: Bowel sounds are normal. There is no distension. Palpations: Abdomen is soft. Tenderness: There is no abdominal tenderness. There is no guarding. Musculoskeletal:         General: Normal range of motion.       Cervical back: Normal range of motion and neck supple. Right lower leg: No edema. Left lower leg: No edema. Comments: B/l hands non-tender to palpation, middle distal phlanx amputed. Left hand, not warm to touch and regular skin colour. Right lymphedema    Skin:     Coloration: Skin is not jaundiced. Comments: Port on right chest with plastic over and dressing   Neurological:      General: No focal deficit present. Mental Status: She is alert and oriented to person, place, and time. Psychiatric:         Mood and Affect: Mood normal.         Behavior: Behavior normal.         Thought Content:  Thought content normal.         Judgment: Judgment normal.       Labs:  Labs Reviewed    Recent Results (from the past 24 hour(s))   EKG 12 Lead    Collection Time: 06/16/22  9:28 AM   Result Value Ref Range    Ventricular Rate 69 BPM    Atrial Rate 69 BPM    P-R Interval 174 ms    QRS Duration 90 ms    Q-T Interval 386 ms    QTc Calculation (Bazett) 413 ms    P Axis 33 degrees    T Axis -22 degrees   Respiratory Panel, Molecular, with COVID-19 (Restricted: peds pts or suitable admitted adults)    Collection Time: 06/16/22 10:28 AM    Specimen: Nasopharyngeal; Nasal   Result Value Ref Range    Adenovirus by PCR Not Detected Not Detected    Bordetella parapertussis by PCR Not Detected Not Detected    Bordetella pertussis by PCR Not Detected Not Detected    Chlamydophilia pneumoniae by PCR Not Detected Not Detected    Coronavirus 229E by PCR Not Detected Not Detected    Coronavirus HKU1 by PCR Not Detected Not Detected    Coronavirus NL63 by PCR Not Detected Not Detected    Coronavirus OC43 by PCR Not Detected Not Detected    SARS-CoV-2, PCR DETECTED (A) Not Detected    Human Metapneumovirus by PCR Not Detected Not Detected    Human Rhinovirus/Enterovirus by PCR Not Detected Not Detected    Influenza A by PCR Not Detected Not Detected    Influenza B by PCR Not Detected Not Detected    Mycoplasma pneumoniae by PCR Not Detected Not Detected    Parainfluenza Virus 1 by PCR Not Detected Not Detected    Parainfluenza Virus 2 by PCR Not Detected Not Detected    Parainfluenza Virus 3 by PCR Not Detected Not Detected    Parainfluenza Virus 4 by PCR Not Detected Not Detected    Respiratory Syncytial Virus by PCR Not Detected Not Detected   POCT Glucose    Collection Time: 06/16/22 11:30 AM   Result Value Ref Range    Meter Glucose 100 (H) 74 - 99 mg/dL   POCT Glucose    Collection Time: 06/16/22  4:11 PM   Result Value Ref Range    Meter Glucose 129 (H) 74 - 99 mg/dL   POCT Glucose    Collection Time: 06/16/22  8:03 PM   Result Value Ref Range    Meter Glucose 151 (H) 74 - 99 mg/dL   Basic Metabolic Panel w/ Reflex to MG    Collection Time: 06/17/22  2:25 AM   Result Value Ref Range    Sodium 137 132 - 146 mmol/L    Potassium reflex Magnesium 3.9 3.5 - 5.0 mmol/L    Chloride 99 98 - 107 mmol/L    CO2 25 22 - 29 mmol/L    Anion Gap 13 7 - 16 mmol/L    Glucose 122 (H) 74 - 99 mg/dL    BUN 24 (H) 6 - 23 mg/dL    CREATININE 4.0 (H) 0.5 - 1.0 mg/dL    GFR Non-African American 14 >=60 mL/min/1.73    GFR African American 14     Calcium 8.9 8.6 - 10.2 mg/dL   CBC with Auto Differential    Collection Time: 06/17/22  2:25 AM   Result Value Ref Range    WBC 6.6 4.5 - 11.5 E9/L    RBC 3.35 (L) 3.50 - 5.50 E12/L    Hemoglobin 9.6 (L) 11.5 - 15.5 g/dL    Hematocrit 31.5 (L) 34.0 - 48.0 %    MCV 94.0 80.0 - 99.9 fL    MCH 28.7 26.0 - 35.0 pg    MCHC 30.5 (L) 32.0 - 34.5 %    RDW 18.6 (H) 11.5 - 15.0 fL    Platelets 081 200 - 799 E9/L    MPV 11.0 7.0 - 12.0 fL    Neutrophils % 72.3 43.0 - 80.0 %    Immature Granulocytes % 0.5 0.0 - 5.0 %    Lymphocytes % 14.2 (L) 20.0 - 42.0 %    Monocytes % 9.9 2.0 - 12.0 %    Eosinophils % 2.6 0.0 - 6.0 %    Basophils % 0.5 0.0 - 2.0 %    Neutrophils Absolute 4.81 1.80 - 7.30 E9/L    Immature Granulocytes # 0.03 E9/L    Lymphocytes Absolute 0.94 (L) 1.50 - 4.00 E9/L    Monocytes Absolute 0.66 0.10 - 0.95 E9/L    Eosinophils Absolute 0.17 0.05 - 0.50 E9/L    Basophils Absolute 0.03 0.00 - 0.20 E9/L   Phosphorus    Collection Time: 06/17/22  2:25 AM   Result Value Ref Range    Phosphorus 2.8 2.5 - 4.5 mg/dL   Magnesium    Collection Time: 06/17/22  2:25 AM   Result Value Ref Range    Magnesium 2.3 1.6 - 2.6 mg/dL   POCT Glucose    Collection Time: 06/17/22  5:12 AM   Result Value Ref Range    Meter Glucose 106 (H) 74 - 99 mg/dL       Radiology and other tests reviewed:  Fluoroscopy modified barium swallow with video   Final Result   1. No barium aspiration or significant swallowing deficits. 2.  Transient laryngeal penetration with thin liquid barium. 3.  Cervical spine degenerative spondylosis. 4.  Please see separate speech pathology report for full discussion of   findings and recommendations. CT HEAD WO CONTRAST   Final Result   No acute intracranial abnormality. CT CHEST WO CONTRAST   Final Result   1. New ground-glass and airspace consolidations throughout right lung notable   in right lower lobe as well as minimal opacities in left lower lobe   suggesting interstitial pulmonary edema or bilateral pneumonia. 2. Cardiomegaly with pulmonary vascular congestion. 3. Multiple stable prominent mediastinal lymph nodes. XR CHEST PORTABLE   Final Result   No acute process.              Assessment:  Active Hospital Problems    Diagnosis Date Noted    Patient is Yazidism [Z78.9] 11/07/2017     Priority: High     Class: Chronic    Healthcare-associated pneumonia [J18.9] 06/16/2022     Priority: Medium    Hypoxia [R09.02] 06/16/2022     Priority: Medium    Ischemic cardiomyopathy [I25.5]      Priority: Medium    AMS (altered mental status) [R41.82] 06/15/2022     Priority: Medium    Elevated troponin [R77.8] 02/17/2017     Priority: Medium    CAD in native artery [I25.10] 12/09/2021    Mitral valve disease [I05.9] 11/11/2021    Lymphedema of right upper extremity [I89.0]     ESRD (end stage renal disease) on dialysis ([facility] [N18.6, Z99.2]     Diabetic peripheral neuropathy ([facility] [E11.42] 08/30/2018    Controlled type 2 diabetes mellitus with chronic kidney disease on chronic dialysis, with long-term current use of insulin ([facility] [E11.22, N18.6, Z79.4, Z99.2] 02/22/2017    Glaucoma [H40.9]        Plan: Altered mental status most likely due to bilateral lower lobe Health-care associated pneumonia  CT head showed no acute intracranial abnormality, ED chest positive for new groundglass and airspace consolidations throughout the right lung, cardiomegaly with pulmonary vascular congestion, multiple stable prominent mediastinal lymph nodes. Influenza AMB not detected COVID-19 negative  Respiratory panel positive for SARS-Cov-2 -4 weeks ago, no need for treatments or isolation at this time  Patient currently requiring 2 L of oxygen via nasal cannula wean as tolerated  Zosyn 4.5 g Q12H,   Nebs every 4 hours PRN  Urine and blood cultures pending  CBC, BMP daily  ID following appreciate input    Chronic osteomyelitis  Stable  IV antibiotic after dialysis Monday Wednesday Friday with vancomycin 750 mg completed 6 weeks  doxycyline 100 mg Q12H for suppression of osteomyelitis.    ID following appreciate input    Mild to moderate pharyngeal phase dysphagia  Fluoroscopic modified barium swallow study showed no barium aspiration no significant swallowing deficits, transient laryngeal penetration is within thin liquid barium, cervical spine degenerative spondylitis  Avoid thin liquids with a straw    Exposed PowerPort  Blood cultures pending  ID following appreciate input    End-stage renal disease hemodialysis on Monday Wednesday Friday  Creatinine baseline 3  Continue home Fosrenol 1 g 3 times daily  Continue Bumex 2mg  BMP, mag, Phos daily  Nephrology following appreciate input    CAD with NSTEMI 1 month ago  EKG showed normal sinus rhythm, possible left atrial enlargement  Continue home metoprolol 25 mg daily, Imdur 30 mg daily, brilinta 90mg BID   Troponin 185, 231   Repeat EKG Normal sinus rhythm, low voltage QRS.      CHF  Last echo with EF of 40 to 45%, dilated LA.  proBNP 70,000  Monitor for fluid overload  Daily weights  Strict ins and outs  Continue home metoprolol 25 mg daily, Imdur 30 mg daily    Anemia of chronic disease   No blood to be given to patient  Daily CBC   hemoglobin today 9.6  Iron 31 and TIBC 139  Ferritin 2221, TIBC    Insulin-dependent diabetes type 2  Last A1c 5.1  5 units Lantus nightly  POCT glucose checks  Per glycemia protocol    History of hypertension  Currently soft blood pressures  Midodrine 5 mg daily as needed  Monitor blood pressures    Glaucoma   Continue home Combigan drops, Xalatan drops    Other UDS screen negative    DVT ppx: Heparin 5000 units 3 times daily  GI ppx: none  Code Status: Full  Diet: Carb controlled -no straw use    Other: patient has a non-working pain pump   Disposition: Discharge to pending clinical course  Deandra Bahena MD  Family Medicine [id number]

## 2022-06-16 NOTE — CARE COORDINATION
Social Work / Discharge Planning : Patient was admitted from [facility]. Call placed to [employer] and VM left. AWait response in regards to bed hold status and pre-cert. SW to follow.  Electronically signed by YRN Jhaveri on 6/16/22 at 8:22 AM EDT

## 2022-06-16 NOTE — CONSULTS
[facility]  Nephrology Progress Note    Patient's Name: Frankie Hussein      History of Present Illness-full consult deferred as pt followed longitudinally at dialysis:    \"Claudia Lawrence is a 78 y.o. female with a past medical history of breast cancer, coronary artery disease, hypertension, hyperlipidemia, and anemia. She presented to the [address] [facility] ED on 5/2 with complaints of back and abdominal pain patient was subsequently transferred to [facility] for NSTEMI. Patient is known to our service and dialyzes at [address] MWF 1st shift left AV fistula. In April she was Dx with DOMINICK zimmerman and is treated with iv Vanc  And has a mediport in the R  Pt is now admitted from the ED for cough and hypoxia. She came to the ED after having ongoing pain in the RLE when sitting, no significant pain when standing or using her walker. She notes since 6 days ago she had a cough productive of grey sputum. Resp Viral Panel (+) for SARS-CoV-2        PMH:    Past Medical History:   Diagnosis Date    Acute infection of bone ([address]     infection of rt foot, resolved.     Acute osteomyelitis of phalanx of left hand ([address] 1/27/2022    Left third distal phalanx    Anemia of chronic disease     Arthritis     Breast cancer ([address]     right breast, 2008/ bladder, 2006- last chemotherapy \"years ago\"    CAD (coronary artery disease)     Carpal tunnel syndrome     bilat - for OR left hand 3-17-20     Chronic diastolic CHF (congestive heart failure) ([address] 09/23/2014 9/23/14- echocardiogram revealed moderate LV concentric hypertrophy, stage III diastolic dysfunction, mild tricuspid regurgitation    CKD (chronic kidney disease) stage 4, GFR 15-29 ml/min ([facility]     Diabetic retinopathy ([address]     Glaucoma     Hemodialysis patient ([address]     [facility] wed fri- Dr. Sandra Anderson - left arm fistula     Hyperkalemia, diminished renal excretion 11/9/2017    Hyperlipidemia     Hypertension     Hypoglycemia unawareness in type 1 diabetes mellitus ([address] 11/7/2017    Insulin dependent type 2 diabetes mellitus ([address]     Neuropathy     feet    Osteomyelitis due to secondary diabetes ([address]     rt great toe with amputation    Patient is Rastafari 11/7/2017    Refusal of blood product     patient states she dose not take blood transfusion    Ventricular hypertrophy     Ventricular tachycardia ([address] 5/24/2021    Vitreous hemorrhage (HCC)     left eye     Patient Active Problem List   Diagnosis    Diabetic retinopathy ([address]    Malignant neoplasm of right female breast ([address]    Atherosclerosis of native coronary artery of native heart without angina pectoris    Moderate obesity    Left ventricular hypertrophy    Herniated lumbar intervertebral disc    Lumbar degenerative disc disease    Pseudomeningocele    Lumbar radiculopathy    Lymphedema of arm    Anemia of chronic disease    Chronic diastolic CHF (congestive heart failure) ([address]    Hypertension    Glaucoma    Refusal of blood product    Controlled type 2 diabetes mellitus with chronic kidney disease on chronic dialysis, with long-term current use of insulin (HCC)    Vitreous hemorrhage ([address]    Patient is Rastafari    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 ([address]    Chronic pain syndrome    Lumbar post-laminectomy syndrome    Cervicalgia    Diabetic peripheral neuropathy ([address]    ESRD (end stage renal disease) ([address]    Bilateral carpal tunnel syndrome    Cardiac arrest ([address]    ESRD (end stage renal disease) on dialysis ([address]    Mixed hyperlipidemia    Lymphedema of right upper extremity    Coronary artery disease involving native coronary artery of native heart with angina pectoris ([address]    Mitral valve disease    CAD in native artery    Lumbar stenosis without neurogenic claudication    Intractable low back pain    Unable to ambulate    NSTEM[facility] (non-ST elevated myocardial infarction) [facility]    Moderate protein-calorie malnutrition ([facility]    AMS (altered mental status)     Meds:     ipratropium-albuterol  1 ampule Inhalation 4x daily    isosorbide mononitrate  30 mg Oral Daily    latanoprost  1 drop Right Eye Nightly    metoprolol succinate  25 mg Oral Daily    pantoprazole  20 mg Oral QAM AC    ticagrelor  90 mg Oral BID    sodium chloride flush  5-40 mL IntraVENous 2 times per day    lanthanum  1,000 mg Oral TID WC    heparin (porcine)  5,000 Units SubCUTAneous TID    piperacillin-tazobactam  4,500 mg IntraVENous Q12H    brimonidine  1 drop Right Eye BID    timolol  1 drop Right Eye BID    aspirin  81 mg Oral Daily      sodium chloride      dextrose       Meds prn:     midodrine, sodium chloride flush, sodium chloride, ondansetron **OR** ondansetron, polyethylene glycol, acetaminophen **OR** acetaminophen, glucose, dextrose bolus **OR** dextrose bolus, glucagon (rDNA), dextrose    Meds prior to admission:     No current facility-administered medications on file prior to encounter. Current Outpatient Medications on File Prior to Encounter   Medication Sig Dispense Refill    gabapentin (NEURONTIN) 100 MG capsule Take 2 capsules by mouth 3 times daily for 180 days.  180 capsule 5    atorvastatin (LIPITOR) 80 MG tablet Take 80 mg by mouth nightly      bumetanide (BUMEX) 2 MG tablet Take 2 mg by mouth three times a week Given Sunday,Tuesday,Thursday      isosorbide mononitrate (IMDUR) 30 MG extended release tablet Take 30 mg by mouth daily      insulin glargine (LANTUS) 100 UNIT/ML injection vial Inject 5 Units into the skin nightly       metoprolol succinate (TOPROL XL) 25 MG extended release tablet Take 1 tablet by mouth daily 90 tablet 1    lanthanum (FOSRENOL) 1000 MG chewable tablet Take 1,000 mg by mouth 3 times daily (with meals)       allopurinol (ZYLOPRIM) 100 MG tablet Take 1 tablet by mouth daily 90 tablet 1    ticagrelor (BRILINTA) 90 MG TABS tablet Take 1 tablet by mouth 2 times daily 180 tablet 1    B Complex-C-Folic Acid (BONI CAPS) 1 MG CAPS Take 1 mg by mouth nightly       omeprazole (PRILOSEC) 20 MG delayed release capsule Take 20 mg by mouth daily as needed       LUMIGAN 0.01 % SOLN ophthalmic drops Place 1 drop into the right eye nightly       COMBIGAN 0.2-0.5 % ophthalmic solution Place 1 drop into the right eye every 12 hours   7    aspirin 81 MG EC tablet Take 1 tablet by mouth daily 30 tablet 0    midodrine (PROAMATINE) 5 MG tablet Take 5 mg by mouth daily as needed      lidocaine-prilocaine (EMLA) 2.5-2.5 % cream Apply topically as needed for Pain        Allergies:    Furosemide    Social History:     reports that she has never smoked. She has never used smokeless tobacco. She reports that she does not drink alcohol and does not use drugs. Family History:         Problem Relation Age of Onset    Breast Cancer Mother 61    Hypertension Mother     Heart Disease Father     Prostate Cancer Father     Breast Cancer Maternal Grandmother 61     Physical Exam:    Patient Vitals for the past 24 hrs:   BP Temp Temp src Pulse Resp SpO2 Height Weight   06/16/22 0945 (!) 91/53 98.1 °F (36.7 °C) Oral 74 17 97 % -- --   06/16/22 0215 (!) 108/59 98.9 °F (37.2 °C) Axillary 69 17 97 % -- --   06/15/22 2312 (!) 113/57 99.3 °F (37.4 °C) Axillary 71 17 100 % 5' 10\" (1.778 m) 159 lb (72.1 kg)   06/15/22 2203 (!) 139/58 -- -- 75 16 98 % -- --   06/15/22 1830 (!) 146/84 98.6 °F (37 °C) Oral 69 16 100 % -- --     No intake or output data in the 24 hours ending 06/16/22 1051  General: Awake, alert, no acute distress  Neck: No JVD noted  Lungs: Clear bilaterally upper, Crackles bilat, no wheezes, Mediport in the R chest  CV: Regular rate and rhythm. No rub  Abd: Soft, nontender, nondistended. Active bowel sounds  Skin: Warm and dry. No rash on exposed extremities  Ext: 1+ RUE edema (h/o breast CA);  No BLE edema ; left AV fistula in the upper ext  Neuro: Awake, answers questions appropriately    Data:    Recent Labs     06/15/22  1914 06/16/22  0350   WBC 12.2* 8.4   HGB 10.3* 9.6*   HCT 34.2 31.6*   MCV 93.7 93.8    143     Recent Labs     06/15/22  2022 06/16/22  0350    136   K 3.9 3.9   CL 99 98   CO2 25 27   CREATININE 2.5* 3.0*   BUN 12 14   LABGLOM 23 19   GLUCOSE 93 91   CALCIUM 8.9 8.9   PHOS 2.4* 2.6   MG 2.2 2.4     Vit D, 25-Hydroxy   Date Value Ref Range Status   03/11/2022 28 (L) 30 - 100 ng/mL Final     Comment:     <20 ng/mL. ........... [place]die [place] Deficient  20-30 ng/mL. ......... [place]die [place] Insufficient   ng/mL. ........ [place]die [place] Sufficient  >100 ng/mL. .......... [place]die [place] Toxic       PTH   Date Value Ref Range Status   03/11/2022 241 (H) 15 - 65 pg/mL Final     Recent Labs     06/15/22  2022   ALT 16   AST 19   ALKPHOS 155*   BILITOT 0.7     Recent Labs     06/15/22  2022   LABALBU 3.5     Ferritin   Date Value Ref Range Status   06/15/2022 2,221 ng/mL Final     Comment:     FERRITIN Reference Ranges:  Adult Males   20 - 60 years:    30 - 400 ng/mL  Adult females 16 - 61 years:    15 - 150 ng/mL  Adults greater than 60 years:   no established reference range  Pediatrics:                     no established reference range       Iron   Date Value Ref Range Status   06/15/2022 31 (L) 37 - 145 mcg/dL Final     TIBC   Date Value Ref Range Status   06/15/2022 139 (L) 250 - 450 mcg/dL Final     Vitamin B-12   Date Value Ref Range Status   02/17/2017 1802 (H) 211 - 946 pg/mL Final     Folate   Date Value Ref Range Status   02/17/2017 >20.0 4.8 - 24.2 ng/mL Final     Lab Results   Component Value Date    COLORU Yellow 11/07/2017    NITRU Negative 11/07/2017    GLUCOSEU Negative 11/07/2017    GLUCOSEU NEGATIVE 08/27/2011    KETUA Negative 11/07/2017    UROBILINOGEN 0.2 11/07/2017    BILIRUBINUR Negative 11/07/2017    BILIRUBINUR NEGATIVE 08/27/2011     No results found for: ALVINO, CREURRAN, MACREATRATIO, OSMOU    No components found for: URIC    No results found for: LIPIDPAN    Assessment and Plans:    1. ESKD on HD  OP [facility] MWF 1st shift   left AV fistula  PLAN:  1. Continue HD MWF    2. Recent  NSTEMI  S/p cardiac catheterization with balloon angioplasty 5/5  Cardiology previously following    3. Back pain  S/p bone biopsy 4/18 for lumbar spine osteomyelitis  On vancomycin  ID following    4. Hypertension  BP goal<140/90  BP at goal  Monitor on current regimen and with HD    5. Anemia  Hemoglobin target 10-12  Hemoglobin 9.6 today  Continue JAYSHREE as hemoglobin below target  Transfuse for hemoglobin<7  Monitor H&H    6.  Secondary hyperparathyroidism of renal origin  PTH noted in the outpatient setting on 4/25/2022 was 443  Phosphorus 2.6  Continue on Fosrenol  Monitor labs    7.  COVID 19 Lori Reddy MD

## 2022-06-16 NOTE — PROGRESS NOTES
Spoke to [facility] infection control RN via telephone re: PCR results, no need for isolation. Universal Safety Interventions

## 2022-06-16 NOTE — PLAN OF CARE
Problem: Skin/Tissue Integrity  Goal: Absence of new skin breakdown  Description: 1. Monitor for areas of redness and/or skin breakdown  2. Assess vascular access sites hourly  3. Every 4-6 hours minimum:  Change oxygen saturation probe site  4. Every 4-6 hours:  If on nasal continuous positive airway pressure, respiratory therapy assess nares and determine need for appliance change or resting period.   Outcome: Progressing     Problem: Safety - Adult  Goal: Free from fall injury  Outcome: Progressing     Problem: ABCDS Injury Assessment  Goal: Absence of physical injury  Outcome: Progressing     Problem: Chronic Conditions and Co-morbidities  Goal: Patient's chronic conditions and co-morbidity symptoms are monitored and maintained or improved  Outcome: Progressing     Problem: Pain  Goal: Verbalizes/displays adequate comfort level or baseline comfort level  Outcome: Progressing

## 2022-06-16 NOTE — ED NOTES
This RN faxed pts SBAR and called to verify they received it. Nurse on the floor stated that she changed pts room number to [phone]. Pt placed in transport at this time.      Yemi Roa RN  06/15/22 1821

## 2022-06-16 NOTE — PROGRESS NOTES
Phosphorus (Less than 1000 mg)    PROCEDURE:  Consistencies Administered During the Evaluation   Liquids: thin liquid and nectar thick liquid   Solids:  pureed foods and solid foods      Method of Intake:   cup, straw, spoon  Self fed      Position:   Seated, upright, Lateral plane    INSTRUMENTAL ASSESSMENT:    ORAL PREP/ ORAL PHASE:    The oral stage of swallowing was within functional limits for consistencies administered     PHARYNGEAL PHASE:     ONSET TIME       Onset time of the pharyngeal swallow was adequate       PHARYNGEAL RESIDUALS        Vallecula/Pharyngeal Wall           Reduced tongue base retraction resulting in residuals in vallecula and/or posterior pharyngeal wall for pureed foods and solid foods which cleared with spontaneous double swallow and liquid chaser       Pyriform Sinuses      No significant residuals were noted in the pyriform sinuses     LARYNGEAL PENETRATION   Laryngeal penetration occurred in the absence of aspiration AFTER the swallow for thin liquid due to pharyngeal residuals which cleared from the laryngeal vestibule spontaneously (transient). Laryngeal penetration was mild-moderate and occurred inconsistently. an absent cough/throat clear was noted. Increased with straw use.      ASPIRATION  Aspiration  was not present during this evaluation however there is high risk for aspiration  of thin liquid per straw due to laryngeal penetration     PENETRATION-ASPIRATION SCALE (PAS):  THIN 2 = Material enters the airway, remains above vocal folds, and is ejected from the airway   MILDLY THICK 1 = Material does not enter the airway  MODERATELY THICK item not administered  PUREE 1 = Material does not enter the airway  HARD SOLID 1 = Material does not enter the airway       COMPENSATORY STRATEGIES    Compensatory strategies that were beneficial included Double swallow, Small bites/sips, Alternate solids and liquids and No straw      STRUCTURAL/FUNCTIONAL ANOMALIES   Cervical osteophytes present per Radiologist           ___________    Cognition:   Within functional limits for this exam    Oral Peripheral Examination   Adequate lingual/labial strength      Parameters of Speech Production  Respiration:  Adequate for speech production  Quality:   Within functional limits  Intensity: Within functional limits    Pain: No pain reported. EDUCATION:   The Speech Language Pathologist (SLP) completed education regarding results of evaluation and that intervention is warranted at this time. Learner: Patient  Education: Reviewed results and recommendations of this evaluation  Evaluation of Education:  Juan R Miller understanding    This plan may be re-evaluated and revised as warranted. Evaluation Time includes thorough review of current medical information, gathering information on past medical history/social history and prior level of function, completion of standardized testing/informal observation of tasks, assessment of data and education on plan of care and goals. INTERVENTION    Pt/family educated on above results and plan of care. Pt/family trained on compensatory strategies for safe swallow and signs and symptoms of aspiration (throat clearing, coughing/choking, wet vocal quality. , etc.) and encouraged to notify staff if observed. Handouts provided as warranted. Pt/family encouraged to engage in question/answer session. All questions answered and pt/family verbalized understanding of above. [x]The admitting diagnosis and active problem list, have been reviewed prior to initiation of this evaluation.     CPT Code: 29657  dysphagia study, 40456 dysphagia therapy    ACTIVE PROBLEM LIST:   Patient Active Problem List   Diagnosis    Diabetic retinopathy ([address]    Malignant neoplasm of right female breast ([address]    Atherosclerosis of native coronary artery of native heart without angina pectoris    Moderate obesity    Left ventricular hypertrophy    Herniated lumbar intervertebral disc  Lumbar degenerative disc disease    Pseudomeningocele    Lumbar radiculopathy    Lymphedema of arm    Anemia of chronic disease    Chronic diastolic CHF (congestive heart failure) (HCC)    Hypertension    Glaucoma    Refusal of blood product    Elevated troponin    Controlled type 2 diabetes mellitus with chronic kidney disease on chronic dialysis, with long-term current use of insulin (HCC)    Vitreous hemorrhage ([address]    Patient is Episcopalian    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 (HCC)    Chronic pain syndrome    Lumbar post-laminectomy syndrome    Cervicalgia    Diabetic peripheral neuropathy ([address]    ESRD (end stage renal disease) ([address]    Bilateral carpal tunnel syndrome    Cardiac arrest ([address]    ESRD (end stage renal disease) on dialysis ([address]    Mixed hyperlipidemia    Lymphedema of right upper extremity    Coronary artery disease involving native coronary artery of native heart with angina pectoris ([address]    Mitral valve disease    CAD in native artery    Lumbar stenosis without neurogenic claudication    Intractable low back pain    Unable to ambulate    NSTEMI (non-ST elevated myocardial infarction) ([address]    Moderate protein-calorie malnutrition (HCC)    AMS (altered mental status)    Ischemic cardiomyopathy    Healthcare-associated pneumonia    Hypoxia       Nirmala BROWNE CCC/SLP [id number]  Speech-Language Pathologist

## 2022-06-16 NOTE — PROGRESS NOTES
P Quality Flow/Interdisciplinary Rounds Progress Note        Quality Flow Rounds held on June 16, 2022    Disciplines Attending:  Bedside Nurse, ,  and Nursing Unit Leadership    Yamilka Escobedo was admitted on 6/15/2022  6:13 PM    Anticipated Discharge Date:       Disposition:    Minor Score:  Minor Scale Score: 18    Readmission Risk              Risk of Unplanned Readmission:  35           Discussed patient goal for the day, patient clinical progression, and barriers to discharge. The following Goal(s) of the Day/Commitment(s) have been identified:  DONALDO [employer].       Selam Patten RN  June 16, 2022

## 2022-06-16 NOTE — ED PROVIDER NOTES
78y.o. year old female presenting to the emergency room with concerns of ams, shortness of breath beginning 3 days ago. patient aox4 on intial   patient reports that symptom's onset 3 days. Worsen with nothin. Improves with oxygen. Severity of moderate, with no radiation. Symptoms are constant in timing. Symptoms described as uincreased sob, with onternittebt confusion per . patient reports  associated symptoms of cough, fatigue. Patient in  no acute distress. Patient on dialysis, sees Dr. Luke Perez. Chief Complaint   Patient presents with    Shortness of Breath     for a few days per pt    Altered Mental Status     per NH staff, pt able to answer all orientation questions       Review of Systems   Constitutional: Positive for appetite change and fatigue. Negative for chills and fever. HENT: Negative for congestion, rhinorrhea, trouble swallowing and voice change. Eyes: Negative for photophobia and visual disturbance. Respiratory: Positive for shortness of breath. Negative for wheezing. Cardiovascular: Negative for chest pain. Gastrointestinal: Negative for abdominal pain, diarrhea, nausea and vomiting. Musculoskeletal: Negative for arthralgias, neck pain and neck stiffness. Skin: Negative for rash and wound. Neurological: Negative for dizziness, syncope, weakness and headaches. Psychiatric/Behavioral: Positive for confusion. Negative for behavioral problems. Physical Exam  Vitals reviewed. Constitutional:       General: She is not in acute distress. Appearance: Normal appearance. She is not ill-appearing. HENT:      Head: Normocephalic. Right Ear: External ear normal.      Left Ear: External ear normal.      Nose: Nose normal.      Mouth/Throat:      Mouth: Mucous membranes are moist.   Eyes:      General:         Right eye: No discharge. Left eye: No discharge.       Conjunctiva/sclera: Conjunctivae normal.   Cardiovascular:      Rate and Rhythm: Normal rate and regular rhythm. Heart sounds: No friction rub. Pulmonary:      Effort: Pulmonary effort is normal. No respiratory distress. Breath sounds: No stridor. Decreased breath sounds and rhonchi present. No wheezing or rales. Chest:          Comments: In place, no evidence of any erythema or localized swelling over the area,  Abdominal:      General: There is no distension. Tenderness: There is no abdominal tenderness. There is no guarding or rebound. Musculoskeletal:         General: No tenderness or deformity. Cervical back: Normal range of motion. No rigidity or tenderness. Skin:     General: Skin is warm. Coloration: Skin is not jaundiced. Neurological:      Mental Status: She is alert. Sensory: No sensory deficit. Motor: No weakness. Psychiatric:         Mood and Affect: Mood normal.         Behavior: Behavior normal.          Procedures       MDM  Number of Diagnoses or Management Options  Diagnosis management comments: 81-year-old female presenting emergency department complaints of shortness of breath, change in mentation per facility. Shortness of breath has been developing over the past 3 days. Patient ANO x4 on initial evaluation. BNP elevated, troponin elevated,. Patient has chronic kidney disease.,  Receives dialysis with Dr. Iona Washington. Chest x-ray demonstrates pneumonia in right lung with mild  opacities in left lung. No acute abnormality noted in CT head or chest x-ray. Lillian Nair Spoke to patient, discussed today's results and plan to admit at this time. Patient is stable at time of admission.        Amount and/or Complexity of Data Reviewed  Decide to obtain previous medical records or to obtain history from someone other than the patient: yes                    --------------------------------------------- PAST HISTORY ---------------------------------------------  Past Medical History:  has a past medical history of Acute infection of bone ([address] Acute osteomyelitis of phalanx of left hand ([address] Anemia of chronic disease, Arthritis, Breast cancer ([address] CAD (coronary artery disease), Carpal tunnel syndrome, Chronic diastolic CHF (congestive heart failure) ([address] CKD (chronic kidney disease) stage 4, GFR 15-29 ml/min ([address] Diabetic retinopathy ([address] Glaucoma, Hemodialysis patient ([address] Hyperkalemia, diminished renal excretion, Hyperlipidemia, Hypertension, Hypoglycemia unawareness in type 1 diabetes mellitus ([address] Insulin dependent type 2 diabetes mellitus ([address] Neuropathy, Osteomyelitis due to secondary diabetes [facility]), Patient is Pentecostal, Refusal of blood product, Ventricular hypertrophy, Ventricular tachycardia ([address] and Vitreous hemorrhage ([address] Past Surgical History:  has a past surgical history that includes Cholecystectomy; amputation; Mastectomy; Cystoscopy; Cataract removal; Ankle surgery; other surgical history (09/27/2011); ECHO Compl W Dop Color Flow (02/14/2013); Echo Complete (09/17/2013); spinal cord decompression; other surgical history (insertion lumbar drain insertion); other surgical history (10/22/2015); other surgical history (11/03/2015); Tunneled venous catheter placement (11/15/2017); Dialysis fistula creation (Left, 01/31/2018); vitrectomy (Left, 04/10/2018); pr rpr retinal dtchmnt w/vitrectomy any meth (Left, 04/10/2018); pr av anast,up arm basilic vein transposit (Left, 05/15/2018); pr ligatn angioaccess av fistula (Left, 09/25/2018); Colonoscopy; Carpal tunnel release (Left, 03/17/2020); transesophageal echocardiogram (11/11/2021); Cardiac catheterization (12/09/2021); Finger amputation (Left, 01/20/2022); bone biopsy (N/A, 04/18/2022); transesophageal echocardiogram (04/19/2022); and Coronary angioplasty (05/05/2022). Social History:  reports that she has never smoked. She has never used smokeless tobacco. She reports that she does not drink alcohol and does not use drugs.     Family History: family history includes Breast Cancer (age of onset: 61) in her maternal grandmother and mother; Heart Disease in her father; Hypertension in her mother; Prostate Cancer in her father. The patients home medications have been reviewed.     Allergies: Furosemide    -------------------------------------------------- RESULTS -------------------------------------------------    LABS:  Results for orders placed or performed during the hospital encounter of 06/15/22   COVID-19, Rapid    Specimen: Nasopharyngeal Swab   Result Value Ref Range    SARS-CoV-2, NAAT Not Detected Not Detected   RAPID INFLUENZA A/B ANTIGENS    Specimen: Nasopharyngeal   Result Value Ref Range    Influenza A by PCR Not Detected Not Detected    Influenza B by PCR Not Detected Not Detected   Culture, Blood 2    Specimen: Blood   Result Value Ref Range    Culture, Blood 2 24 Hours no growth    Culture, Blood 1    Specimen: Blood   Result Value Ref Range    Blood Culture, Routine 24 Hours no growth    Respiratory Panel, Molecular, with COVID-19 (Restricted: peds pts or suitable admitted adults)    Specimen: Nasopharyngeal; Nasal   Result Value Ref Range    Adenovirus by PCR Not Detected Not Detected    Bordetella parapertussis by PCR Not Detected Not Detected    Bordetella pertussis by PCR Not Detected Not Detected    Chlamydophilia pneumoniae by PCR Not Detected Not Detected    Coronavirus 229E by PCR Not Detected Not Detected    Coronavirus HKU1 by PCR Not Detected Not Detected    Coronavirus NL63 by PCR Not Detected Not Detected    Coronavirus OC43 by PCR Not Detected Not Detected    SARS-CoV-2, PCR DETECTED (A) Not Detected    Human Metapneumovirus by PCR Not Detected Not Detected    Human Rhinovirus/Enterovirus by PCR Not Detected Not Detected    Influenza A by PCR Not Detected Not Detected    Influenza B by PCR Not Detected Not Detected    Mycoplasma pneumoniae by PCR Not Detected Not Detected    Parainfluenza Virus 1 by PCR Not Detected Not Detected Parainfluenza Virus 2 by PCR Not Detected Not Detected    Parainfluenza Virus 3 by PCR Not Detected Not Detected    Parainfluenza Virus 4 by PCR Not Detected Not Detected    Respiratory Syncytial Virus by PCR Not Detected Not Detected   Culture, Respiratory    Specimen: Sputum Expectorated   Result Value Ref Range    Smear, Respiratory       Few Polymorphonuclear leukocytes  Epithelial cells not seen  Moderate Gram positive cocci in pairs     Lactic Acid   Result Value Ref Range    Lactic Acid 1.0 0.5 - 2.2 mmol/L   CBC with Auto Differential   Result Value Ref Range    WBC 12.2 (H) 4.5 - 11.5 E9/L    RBC 3.65 3.50 - 5.50 E12/L    Hemoglobin 10.3 (L) 11.5 - 15.5 g/dL    Hematocrit 34.2 34.0 - 48.0 %    MCV 93.7 80.0 - 99.9 fL    MCH 28.2 26.0 - 35.0 pg    MCHC 30.1 (L) 32.0 - 34.5 %    RDW 19.1 (H) 11.5 - 15.0 fL    Platelets 480 745 - 446 E9/L    MPV 12.0 7.0 - 12.0 fL    Neutrophils % 86.5 (H) 43.0 - 80.0 %    Immature Granulocytes % 0.3 0.0 - 5.0 %    Lymphocytes % 7.1 (L) 20.0 - 42.0 %    Monocytes % 6.0 2.0 - 12.0 %    Eosinophils % 0.0 0.0 - 6.0 %    Basophils % 0.1 0.0 - 2.0 %    Neutrophils Absolute 10.53 (H) 1.80 - 7.30 E9/L    Immature Granulocytes # 0.04 E9/L    Lymphocytes Absolute 0.87 (L) 1.50 - 4.00 E9/L    Monocytes Absolute 0.73 0.10 - 0.95 E9/L    Eosinophils Absolute 0.00 (L) 0.05 - 0.50 E9/L    Basophils Absolute 0.01 0.00 - 0.20 E9/L   Protime-INR   Result Value Ref Range    Protime 12.3 9.3 - 12.4 sec    INR 1.1    Sedimentation Rate   Result Value Ref Range    Sed Rate 30 (H) 0 - 20 mm/Hr   Serum Drug Screen   Result Value Ref Range    Ethanol Lvl <10 mg/dL    Acetaminophen Level <5.0 (L) 10.0 - 46.5 mcg/mL    Salicylate, Serum <2.2 0.0 - 30.0 mg/dL    TCA Scrn NEGATIVE Cutoff:300 ng/mL   Osmolality, Serum   Result Value Ref Range    Osmolality 287 285 - 310 mOsm/Kg   Ammonia   Result Value Ref Range    Ammonia 10.1 (L) 11.0 - 51.0 umol/L   Lactate, Sepsis   Result Value Ref Range    Lactic American 19     Calcium 8.9 8.6 - 10.2 mg/dL   CBC with Auto Differential   Result Value Ref Range    WBC 8.4 4.5 - 11.5 E9/L    RBC 3.37 (L) 3.50 - 5.50 E12/L    Hemoglobin 9.6 (L) 11.5 - 15.5 g/dL    Hematocrit 31.6 (L) 34.0 - 48.0 %    MCV 93.8 80.0 - 99.9 fL    MCH 28.5 26.0 - 35.0 pg    MCHC 30.4 (L) 32.0 - 34.5 %    RDW 18.9 (H) 11.5 - 15.0 fL    Platelets 409 609 - 981 E9/L    MPV 11.8 7.0 - 12.0 fL    Neutrophils % 74.1 43.0 - 80.0 %    Immature Granulocytes % 0.2 0.0 - 5.0 %    Lymphocytes % 14.6 (L) 20.0 - 42.0 %    Monocytes % 10.5 2.0 - 12.0 %    Eosinophils % 0.2 0.0 - 6.0 %    Basophils % 0.4 0.0 - 2.0 %    Neutrophils Absolute 6.18 1.80 - 7.30 E9/L    Immature Granulocytes # 0.02 E9/L    Lymphocytes Absolute 1.22 (L) 1.50 - 4.00 E9/L    Monocytes Absolute 0.88 0.10 - 0.95 E9/L    Eosinophils Absolute 0.02 (L) 0.05 - 0.50 E9/L    Basophils Absolute 0.03 0.00 - 0.20 E9/L   Iron and TIBC   Result Value Ref Range    Iron 31 (L) 37 - 145 mcg/dL    TIBC 139 (L) 250 - 450 mcg/dL    Iron Saturation 22 15 - 50 %   Ferritin   Result Value Ref Range    Ferritin 2,221 ng/mL   Brain Natriuretic Peptide   Result Value Ref Range    Pro-BNP >70,000 (H) 0 - 125 pg/mL   Troponin   Result Value Ref Range    Troponin, High Sensitivity 231 (H) 0 - 9 ng/L   Phosphorus   Result Value Ref Range    Phosphorus 2.6 2.5 - 4.5 mg/dL   Magnesium   Result Value Ref Range    Magnesium 2.4 1.6 - 2.6 mg/dL   Phosphorus   Result Value Ref Range    Phosphorus 2.4 (L) 2.5 - 4.5 mg/dL   Basic Metabolic Panel w/ Reflex to MG   Result Value Ref Range    Sodium 137 132 - 146 mmol/L    Potassium reflex Magnesium 3.9 3.5 - 5.0 mmol/L    Chloride 99 98 - 107 mmol/L    CO2 25 22 - 29 mmol/L    Anion Gap 13 7 - 16 mmol/L    Glucose 122 (H) 74 - 99 mg/dL    BUN 24 (H) 6 - 23 mg/dL    CREATININE 4.0 (H) 0.5 - 1.0 mg/dL    GFR Non-African American 14 >=60 mL/min/1.73    GFR African American 14     Calcium 8.9 8.6 - 10.2 mg/dL   CBC with Auto Differential   Result Value Ref Range    WBC 6.6 4.5 - 11.5 E9/L    RBC 3.35 (L) 3.50 - 5.50 E12/L    Hemoglobin 9.6 (L) 11.5 - 15.5 g/dL    Hematocrit 31.5 (L) 34.0 - 48.0 %    MCV 94.0 80.0 - 99.9 fL    MCH 28.7 26.0 - 35.0 pg    MCHC 30.5 (L) 32.0 - 34.5 %    RDW 18.6 (H) 11.5 - 15.0 fL    Platelets 631 500 - 773 E9/L    MPV 11.0 7.0 - 12.0 fL    Neutrophils % 72.3 43.0 - 80.0 %    Immature Granulocytes % 0.5 0.0 - 5.0 %    Lymphocytes % 14.2 (L) 20.0 - 42.0 %    Monocytes % 9.9 2.0 - 12.0 %    Eosinophils % 2.6 0.0 - 6.0 %    Basophils % 0.5 0.0 - 2.0 %    Neutrophils Absolute 4.81 1.80 - 7.30 E9/L    Immature Granulocytes # 0.03 E9/L    Lymphocytes Absolute 0.94 (L) 1.50 - 4.00 E9/L    Monocytes Absolute 0.66 0.10 - 0.95 E9/L    Eosinophils Absolute 0.17 0.05 - 0.50 E9/L    Basophils Absolute 0.03 0.00 - 0.20 E9/L   Phosphorus   Result Value Ref Range    Phosphorus 2.8 2.5 - 4.5 mg/dL   Magnesium   Result Value Ref Range    Magnesium 2.3 1.6 - 2.6 mg/dL   POCT Glucose   Result Value Ref Range    Meter Glucose 95 74 - 99 mg/dL   POCT Glucose   Result Value Ref Range    Meter Glucose 84 74 - 99 mg/dL   POCT Glucose   Result Value Ref Range    Meter Glucose 100 (H) 74 - 99 mg/dL   POCT Glucose   Result Value Ref Range    Meter Glucose 129 (H) 74 - 99 mg/dL   POCT Glucose   Result Value Ref Range    Meter Glucose 151 (H) 74 - 99 mg/dL   POCT Glucose   Result Value Ref Range    Meter Glucose 106 (H) 74 - 99 mg/dL   EKG 12 Lead   Result Value Ref Range    Ventricular Rate 69 BPM    Atrial Rate 69 BPM    P-R Interval 188 ms    QRS Duration 92 ms    Q-T Interval 400 ms    QTc Calculation (Bazett) 428 ms    P Axis 39 degrees    R Axis 23 degrees    T Axis 9 degrees   EKG 12 Lead   Result Value Ref Range    Ventricular Rate 69 BPM    Atrial Rate 69 BPM    P-R Interval 174 ms    QRS Duration 90 ms    Q-T Interval 386 ms    QTc Calculation (Bazett) 413 ms    P Axis 33 degrees    T Axis -22 degrees   TYPE AND SCREEN NOTES ------------------------------------------  Re-evaluation(s):  Time:   Patients symptoms show no change  Repeat physical examination is not changed    Counseling:  I have spoken with the patient and discussed todays results, in addition to providing specific details for the plan of care and counseling regarding the diagnosis and prognosis. Their questions are answered at this time and they are agreeable with the plan of admission.    --------------------------------- ADDITIONAL PROVIDER NOTES ---------------------------------  Consultations:   Spoke with  case. They will admit the patient. This patient's ED course included: a personal history and physicial examination, re-evaluation prior to disposition, multiple bedside re-evaluations, IV medications, cardiac monitoring and continuous pulse oximetry    This patient has remained hemodynamically stable during their ED course. Diagnosis:  1. Acute respiratory failure with hypoxia (HCC)        Disposition:  Patient's disposition: Admit  Patient's condition is stable. Attending was present and available throughout encounter including all critical portions; See Attending Note/Attestation for Final Plan       Susannah Rod DO  Resident  06/17/22 0041      ATTENDING PROVIDER ATTESTATION:     Ofelia Osman presented to the emergency department for evaluation of Shortness of Breath (for a few days per pt) and Altered Mental Status (per NH staff, pt able to answer all orientation questions)   and was initially evaluated by the Medical Resident. See Original ED Note for H&P and ED course above. I have reviewed and discussed the case, including pertinent history (medical, surgical, family and social) and exam findings with the Medical Resident assigned to Ofelia Osman. I have personally performed and/or participated in the history, exam, medical decision making, and procedures and agree with all pertinent clinical information. I have reviewed my findings and recommendations with the assigned Medical Resident, Kath Bledsoe and members of family present at the time of disposition. My findings/plan: The encounter diagnosis was Acute respiratory failure with hypoxia ([facility] [address] [facility]   Current Discharge Medication List        Trevor Castleman, DO Trevor Castleman, DO  06/17/22 SELINA Fabian [facility], DO  06/17/22 [id number]

## 2022-06-16 NOTE — PROGRESS NOTES
[facility]  [facility]    Chief complaint :  Chief Complaint   Patient presents with    Shortness of Breath     for a few days per pt    Altered Mental Status     per NH staff, pt able to answer all orientation questions       Subjective:    No overnight problems. Patient resting comfortably with 5L NC. She has been coughing. Patient describes feeling better. Patient denies chest pain, nausea, vomiting, fever, chills. Patient is tolerating diet. As per nurse, patient is doing well, waxing and waning mentation overnight patient was able to have appropriate bowel movement and vitals have been stable. Past medical, surgical, family and social history were reviewed, non-contributory, and unchanged unless otherwise stated. Objective:  BP (!) 108/59   Pulse 69   Temp 98.9 °F (37.2 °C) (Axillary)   Resp 17   Ht 5' 10\" (1.778 m)   Wt 159 lb (72.1 kg)   SpO2 97%   BMI 22.81 kg/m²   Vital signs reviewed     Physical Exam  Vitals and nursing note reviewed. Constitutional:       Appearance: Normal appearance. HENT:      Head: Normocephalic and atraumatic. Nose: Nose normal. No congestion or rhinorrhea. Mouth/Throat:      Mouth: Mucous membranes are moist.      Pharynx: Oropharynx is clear. Eyes:      General: No scleral icterus. Conjunctiva/sclera: Conjunctivae normal.   Neck:      Vascular: No carotid bruit. Cardiovascular:      Rate and Rhythm: Normal rate and regular rhythm. Heart sounds: Murmur heard. No gallop. Pulmonary:      Breath sounds: Rales present. No wheezing. Comments: Increased effort, requiring 5L of NC   Abdominal:      General: Bowel sounds are normal. There is no distension. Palpations: Abdomen is soft. Tenderness: There is abdominal tenderness. There is no guarding. Musculoskeletal:         General: Normal range of motion. Cervical back: Normal range of motion and neck supple.       Right lower leg: No edema. Left lower leg: No edema. Comments: B/l hands non-tender to palpation, middle distal phlanx amputed. Left hand, not warm to touch and regular skin colour. Right lymphedema    Skin:     Coloration: Skin is not jaundiced. Comments: Port on right chest with plastic over and dressing   Neurological:      General: No focal deficit present. Mental Status: She is alert. Comments: Orientated to place this morning   Hard of hearing    Psychiatric:         Mood and Affect: Mood normal.         Behavior: Behavior normal.         Thought Content:  Thought content normal.         Judgment: Judgment normal.       Labs:  Labs Reviewed    Recent Results (from the past 24 hour(s))   Lactic Acid    Collection Time: 06/15/22  6:53 PM   Result Value Ref Range    Lactic Acid 1.0 0.5 - 2.2 mmol/L   COVID-19, Rapid    Collection Time: 06/15/22  6:53 PM    Specimen: Nasopharyngeal Swab   Result Value Ref Range    SARS-CoV-2, NAAT Not Detected Not Detected   RAPID INFLUENZA A/B ANTIGENS    Collection Time: 06/15/22  6:53 PM    Specimen: Nasopharyngeal   Result Value Ref Range    Influenza A by PCR Not Detected Not Detected    Influenza B by PCR Not Detected Not Detected   Ammonia    Collection Time: 06/15/22  6:53 PM   Result Value Ref Range    Ammonia 10.1 (L) 11.0 - 51.0 umol/L   EKG 12 Lead    Collection Time: 06/15/22  6:56 PM   Result Value Ref Range    Ventricular Rate 69 BPM    Atrial Rate 69 BPM    P-R Interval 188 ms    QRS Duration 92 ms    Q-T Interval 400 ms    QTc Calculation (Bazett) 428 ms    P Axis 39 degrees    R Axis 23 degrees    T Axis 9 degrees   CBC with Auto Differential    Collection Time: 06/15/22  7:14 PM   Result Value Ref Range    WBC 12.2 (H) 4.5 - 11.5 E9/L    RBC 3.65 3.50 - 5.50 E12/L    Hemoglobin 10.3 (L) 11.5 - 15.5 g/dL    Hematocrit 34.2 34.0 - 48.0 %    MCV 93.7 80.0 - 99.9 fL    MCH 28.2 26.0 - 35.0 pg    MCHC 30.1 (L) 32.0 - 34.5 %    RDW 19.1 (H) 11.5 - 15.0 fL    Platelets 439 974 - 806 E9/L    MPV 12.0 7.0 - 12.0 fL    Neutrophils % 86.5 (H) 43.0 - 80.0 %    Immature Granulocytes % 0.3 0.0 - 5.0 %    Lymphocytes % 7.1 (L) 20.0 - 42.0 %    Monocytes % 6.0 2.0 - 12.0 %    Eosinophils % 0.0 0.0 - 6.0 %    Basophils % 0.1 0.0 - 2.0 %    Neutrophils Absolute 10.53 (H) 1.80 - 7.30 E9/L    Immature Granulocytes # 0.04 E9/L    Lymphocytes Absolute 0.87 (L) 1.50 - 4.00 E9/L    Monocytes Absolute 0.73 0.10 - 0.95 E9/L    Eosinophils Absolute 0.00 (L) 0.05 - 0.50 E9/L    Basophils Absolute 0.01 0.00 - 0.20 E9/L   Protime-INR    Collection Time: 06/15/22  7:14 PM   Result Value Ref Range    Protime 12.3 9.3 - 12.4 sec    INR 1.1    Sedimentation Rate    Collection Time: 06/15/22  7:14 PM   Result Value Ref Range    Sed Rate 30 (H) 0 - 20 mm/Hr   Serum Drug Screen    Collection Time: 06/15/22  7:14 PM   Result Value Ref Range    Ethanol Lvl <10 mg/dL    Acetaminophen Level <5.0 (L) 10.0 - 46.4 mcg/mL    Salicylate, Serum <7.1 0.0 - 30.0 mg/dL    TCA Scrn NEGATIVE Cutoff:300 ng/mL   Osmolality, Serum    Collection Time: 06/15/22  7:14 PM   Result Value Ref Range    Osmolality 287 285 - 310 mOsm/Kg   TYPE AND SCREEN    Collection Time: 06/15/22  7:25 PM   Result Value Ref Range    ABO/Rh O POS     Antibody Screen NEG    SPECIMEN REJECTION    Collection Time: 06/15/22  8:13 PM   Result Value Ref Range    Rejected Test TRP/BMPX/LIVER/     Reason for Rejection see below    Lactate, Sepsis    Collection Time: 06/15/22  8:22 PM   Result Value Ref Range    Lactic Acid, Sepsis 0.8 0.5 - 1.9 mmol/L   Comprehensive Metabolic Panel    Collection Time: 06/15/22  8:22 PM   Result Value Ref Range    Sodium 137 132 - 146 mmol/L    Potassium 3.9 3.5 - 5.0 mmol/L    Chloride 99 98 - 107 mmol/L    CO2 25 22 - 29 mmol/L    Anion Gap 13 7 - 16 mmol/L    Glucose 93 74 - 99 mg/dL    BUN 12 6 - 23 mg/dL    CREATININE 2.5 (H) 0.5 - 1.0 mg/dL    GFR Non-African American 23 >=60 mL/min/1.73    GFR African American 23     Calcium 8.9 8.6 - 10.2 mg/dL    Total Protein 6.7 6.4 - 8.3 g/dL    Albumin 3.5 3.5 - 5.2 g/dL    Total Bilirubin 0.7 0.0 - 1.2 mg/dL    Alkaline Phosphatase 155 (H) 35 - 104 U/L    ALT 16 0 - 32 U/L    AST 19 0 - 31 U/L   Troponin    Collection Time: 06/15/22  8:22 PM   Result Value Ref Range    Troponin, High Sensitivity 185 (H) 0 - 9 ng/L   Magnesium    Collection Time: 06/15/22  8:22 PM   Result Value Ref Range    Magnesium 2.2 1.6 - 2.6 mg/dL   Brain Natriuretic Peptide    Collection Time: 06/15/22  8:22 PM   Result Value Ref Range    Pro-BNP >70,000 (H) 0 - 125 pg/mL   Phosphorus    Collection Time: 06/15/22  8:22 PM   Result Value Ref Range    Phosphorus 2.4 (L) 2.5 - 4.5 mg/dL   Blood Gas, Arterial    Collection Time: 06/15/22  9:39 PM   Result Value Ref Range    Date Analyzed [id number]     Time Analyzed 2139     Source: Blood Arterial     pH, Blood Gas 7.390 7.350 - 7.450    PCO2 46.6 (H) 35.0 - 45.0 mmHg    PO2 104.6 (H) 75.0 - 100.0 mmHg    HCO3 27.6 (H) 22.0 - 26.0 mmol/L    B.E. 2.1 -3.0 - 3.0 mmol/L    O2 Sat 98.6 (H) 92.0 - 98.5 %    O2Hb 96.6 94.0 - 97.0 %    COHb 1.7 (H) 0.0 - 1.5 %    MetHb 0.3 0.0 - 1.5 %    O2 Content 15.1 mL/dL    HHb 1.4 0.0 - 5.0 %    tHb (est) 11.0 (L) 11.5 - 16.5 g/dL    Mode 8L breathing tx     Date Of Collection      Time Collected      Pt Temp 37.0 C     ID [id number]     Lab [id number]     Critical(s) Notified .  No Critical Values    Troponin    Collection Time: 06/15/22 11:50 PM   Result Value Ref Range    Troponin, High Sensitivity 231 (H) 0 - 9 ng/L   POCT Glucose    Collection Time: 06/16/22 12:21 AM   Result Value Ref Range    Meter Glucose 95 74 - 99 mg/dL   Basic Metabolic Panel w/ Reflex to MG    Collection Time: 06/16/22  3:50 AM   Result Value Ref Range    Sodium 136 132 - 146 mmol/L    Potassium reflex Magnesium 3.9 3.5 - 5.0 mmol/L    Chloride 98 98 - 107 mmol/L    CO2 27 22 - 29 mmol/L    Anion Gap 11 7 - 16 mmol/L    Glucose 91 74 - 99 mg/dL BUN 14 6 - 23 mg/dL    CREATININE 3.0 (H) 0.5 - 1.0 mg/dL    GFR Non-African American 19 >=60 mL/min/1.73    GFR African American 19     Calcium 8.9 8.6 - 10.2 mg/dL   CBC with Auto Differential    Collection Time: 06/16/22  3:50 AM   Result Value Ref Range    WBC 8.4 4.5 - 11.5 E9/L    RBC 3.37 (L) 3.50 - 5.50 E12/L    Hemoglobin 9.6 (L) 11.5 - 15.5 g/dL    Hematocrit 31.6 (L) 34.0 - 48.0 %    MCV 93.8 80.0 - 99.9 fL    MCH 28.5 26.0 - 35.0 pg    MCHC 30.4 (L) 32.0 - 34.5 %    RDW 18.9 (H) 11.5 - 15.0 fL    Platelets 951 143 - 278 E9/L    MPV 11.8 7.0 - 12.0 fL    Neutrophils % 74.1 43.0 - 80.0 %    Immature Granulocytes % 0.2 0.0 - 5.0 %    Lymphocytes % 14.6 (L) 20.0 - 42.0 %    Monocytes % 10.5 2.0 - 12.0 %    Eosinophils % 0.2 0.0 - 6.0 %    Basophils % 0.4 0.0 - 2.0 %    Neutrophils Absolute 6.18 1.80 - 7.30 E9/L    Immature Granulocytes # 0.02 E9/L    Lymphocytes Absolute 1.22 (L) 1.50 - 4.00 E9/L    Monocytes Absolute 0.88 0.10 - 0.95 E9/L    Eosinophils Absolute 0.02 (L) 0.05 - 0.50 E9/L    Basophils Absolute 0.03 0.00 - 0.20 E9/L   Phosphorus    Collection Time: 06/16/22  3:50 AM   Result Value Ref Range    Phosphorus 2.6 2.5 - 4.5 mg/dL   Magnesium    Collection Time: 06/16/22  3:50 AM   Result Value Ref Range    Magnesium 2.4 1.6 - 2.6 mg/dL       Radiology and other tests reviewed:  CT HEAD WO CONTRAST   Final Result   No acute intracranial abnormality. CT CHEST WO CONTRAST   Final Result   1. New ground-glass and airspace consolidations throughout right lung notable   in right lower lobe as well as minimal opacities in left lower lobe   suggesting interstitial pulmonary edema or bilateral pneumonia. 2. Cardiomegaly with pulmonary vascular congestion. 3. Multiple stable prominent mediastinal lymph nodes. XR CHEST PORTABLE   Final Result   No acute process.              Assessment:  Active Hospital Problems    Diagnosis Date Noted    Patient is Anglican [Z78.9] 11/07/2017 Priority: High     Class: Chronic    AMS (altered mental status) [R41.82] 06/15/2022     Priority: Medium    CAD in native artery [I25.10] 12/09/2021    Mitral valve disease [I05.9] 11/11/2021    Lymphedema of right upper extremity [I89.0]     ESRD (end stage renal disease) on dialysis ([facility] [N18.6, Z99.2]     Diabetic peripheral neuropathy ([facility] [E11.42] 08/30/2018    Controlled type 2 diabetes mellitus with chronic kidney disease on chronic dialysis, with long-term current use of insulin ([facility] [E11.22, N18.6, Z79.4, Z99.2] 02/22/2017    Glaucoma [H40.9]        Plan: Altered mental status most likely due to bilateral lower lobe Health-care associated pneumonia  CT head showed no acute intracranial abnormality, ED chest positive for new groundglass and airspace consolidations throughout the right lung, cardiomegaly with pulmonary vascular congestion, multiple stable prominent mediastinal lymph nodes.   Influenza AMB not detected COVID-19 negative  Patient currently requiring 5 L of oxygen via nasal cannula wean as tolerated  Zosyn 4.5 g Q12H, doxycyline 100 mg Q12H  Nebs every 4 hours PRN  Urine and blood cultures pending  CBC, BMP daily  ID following appreciate input    Chronic osteomyelitis of the left third digit on the hand  Stable  IV antibiotic after dialysis Monday Wednesday Friday with vancomycin 750 mg completed 6 weeks  ID consulted appreciate input    Exposed [employer]  Blood cultures pending  ID following appreciate input    End-stage renal disease hemodialysis on Monday Wednesday Friday  Creatinine baseline 3  Continue home Fosrenol 1 g 3 times daily  Hold home Bumex  BMP, mag, Phos daily  Nephrology following appreciate input    CAD with NSTEMI 1 month ago  EKG showed normal sinus rhythm, possible left atrial enlargement  Continue home metoprolol 25 mg daily, Imdur 30 mg daily, brilinta 90mg BID   Troponin 185, 231   Repeat EKG    CHF  Last echo with EF of 40 to 45%, dilated LA.  proBNP 70,000  Monitor for fluid overload  Daily weights  Strict ins and outs  Continue home metoprolol 25 mg daily, Imdur 30 mg daily    Anemia  No blood to be given to patient  Daily CBC   hemoglobin today 9.6  Iron and TIBC pending  Ferritin pending    Insulin-dependent diabetes type 2  Last A1c 5.1  5 units Lantus nightly  POCT glucose checks  Per glycemia protocol    History of hypertension  Currently soft blood pressures  Midodrine 5 mg daily as needed  Monitor blood pressures    Glaucoma   Continue home Combigan drops, Xalatan drops    Other UDS screen negative    DVT ppx: Heparin 5000 units 3 times daily  GI ppx: none  Code Status: Full  Diet: Carb controlled renal    Other: patient has a non-working pain pump   Disposition: Discharge to pending clinical course  Rosana Payan MD  Family Medicine [id number]

## 2022-06-17 LAB
ANION GAP SERPL CALCULATED.3IONS-SCNC: 13 MMOL/L (ref 7–16)
BASOPHILS ABSOLUTE: 0.03 E9/L (ref 0–0.2)
BASOPHILS RELATIVE PERCENT: 0.5 % (ref 0–2)
BUN BLDV-MCNC: 24 MG/DL (ref 6–23)
CALCIUM SERPL-MCNC: 8.9 MG/DL (ref 8.6–10.2)
CHLORIDE BLD-SCNC: 99 MMOL/L (ref 98–107)
CO2: 25 MMOL/L (ref 22–29)
CREAT SERPL-MCNC: 4 MG/DL (ref 0.5–1)
EOSINOPHILS ABSOLUTE: 0.17 E9/L (ref 0.05–0.5)
EOSINOPHILS RELATIVE PERCENT: 2.6 % (ref 0–6)
GFR AFRICAN AMERICAN: 14
GFR NON-AFRICAN AMERICAN: 14 ML/MIN/1.73
GLUCOSE BLD-MCNC: 122 MG/DL (ref 74–99)
HCT VFR BLD CALC: 31.5 % (ref 34–48)
HEMOGLOBIN: 9.6 G/DL (ref 11.5–15.5)
IMMATURE GRANULOCYTES #: 0.03 E9/L
IMMATURE GRANULOCYTES %: 0.5 % (ref 0–5)
LYMPHOCYTES ABSOLUTE: 0.94 E9/L (ref 1.5–4)
LYMPHOCYTES RELATIVE PERCENT: 14.2 % (ref 20–42)
MAGNESIUM: 2.3 MG/DL (ref 1.6–2.6)
MCH RBC QN AUTO: 28.7 PG (ref 26–35)
MCHC RBC AUTO-ENTMCNC: 30.5 % (ref 32–34.5)
MCV RBC AUTO: 94 FL (ref 80–99.9)
METER GLUCOSE: 104 MG/DL (ref 74–99)
METER GLUCOSE: 106 MG/DL (ref 74–99)
METER GLUCOSE: 139 MG/DL (ref 74–99)
MONOCYTES ABSOLUTE: 0.66 E9/L (ref 0.1–0.95)
MONOCYTES RELATIVE PERCENT: 9.9 % (ref 2–12)
NEUTROPHILS ABSOLUTE: 4.81 E9/L (ref 1.8–7.3)
NEUTROPHILS RELATIVE PERCENT: 72.3 % (ref 43–80)
PDW BLD-RTO: 18.6 FL (ref 11.5–15)
PHOSPHORUS: 2.8 MG/DL (ref 2.5–4.5)
PLATELET # BLD: 152 E9/L (ref 130–450)
PMV BLD AUTO: 11 FL (ref 7–12)
POTASSIUM REFLEX MAGNESIUM: 3.9 MMOL/L (ref 3.5–5)
RBC # BLD: 3.35 E12/L (ref 3.5–5.5)
SODIUM BLD-SCNC: 137 MMOL/L (ref 132–146)
WBC # BLD: 6.6 E9/L (ref 4.5–11.5)

## 2022-06-17 PROCEDURE — 80048 BASIC METABOLIC PNL TOTAL CA: CPT

## 2022-06-17 PROCEDURE — 99232 SBSQ HOSP IP/OBS MODERATE 35: CPT | Performed by: FAMILY MEDICINE

## 2022-06-17 PROCEDURE — 85025 COMPLETE CBC W/AUTO DIFF WBC: CPT

## 2022-06-17 PROCEDURE — 84100 ASSAY OF PHOSPHORUS: CPT

## 2022-06-17 PROCEDURE — 2580000003 HC RX 258: Performed by: FAMILY MEDICINE

## 2022-06-17 PROCEDURE — 36415 COLL VENOUS BLD VENIPUNCTURE: CPT

## 2022-06-17 PROCEDURE — 6370000000 HC RX 637 (ALT 250 FOR IP): Performed by: FAMILY MEDICINE

## 2022-06-17 PROCEDURE — 82962 GLUCOSE BLOOD TEST: CPT

## 2022-06-17 PROCEDURE — 83735 ASSAY OF MAGNESIUM: CPT

## 2022-06-17 PROCEDURE — 6360000002 HC RX W HCPCS: Performed by: FAMILY MEDICINE

## 2022-06-17 PROCEDURE — 94640 AIRWAY INHALATION TREATMENT: CPT

## 2022-06-17 PROCEDURE — 6370000000 HC RX 637 (ALT 250 FOR IP): Performed by: SPECIALIST

## 2022-06-17 PROCEDURE — 2060000000 HC ICU INTERMEDIATE R&B

## 2022-06-17 PROCEDURE — 90935 HEMODIALYSIS ONE EVALUATION: CPT

## 2022-06-17 PROCEDURE — 5A1D70Z PERFORMANCE OF URINARY FILTRATION, INTERMITTENT, LESS THAN 6 HOURS PER DAY: ICD-10-PCS | Performed by: FAMILY MEDICINE

## 2022-06-17 PROCEDURE — 2700000000 HC OXYGEN THERAPY PER DAY

## 2022-06-17 PROCEDURE — 94668 MNPJ CHEST WALL SBSQ: CPT

## 2022-06-17 RX ADMIN — HEPARIN SODIUM 5000 UNITS: 10000 INJECTION INTRAVENOUS; SUBCUTANEOUS at 20:26

## 2022-06-17 RX ADMIN — PIPERACILLIN AND TAZOBACTAM 4500 MG: 4; .5 INJECTION, POWDER, LYOPHILIZED, FOR SOLUTION INTRAVENOUS at 07:56

## 2022-06-17 RX ADMIN — IPRATROPIUM BROMIDE AND ALBUTEROL SULFATE 1 AMPULE: .5; 2.5 SOLUTION RESPIRATORY (INHALATION) at 12:24

## 2022-06-17 RX ADMIN — TICAGRELOR 90 MG: 90 TABLET ORAL at 20:26

## 2022-06-17 RX ADMIN — ASPIRIN 81 MG CHEWABLE TABLET 81 MG: 81 TABLET CHEWABLE at 07:54

## 2022-06-17 RX ADMIN — BRIMONIDINE TARTRATE 1 DROP: 2 SOLUTION/ DROPS OPHTHALMIC at 20:25

## 2022-06-17 RX ADMIN — DOXYCYCLINE HYCLATE 100 MG: 100 CAPSULE ORAL at 07:54

## 2022-06-17 RX ADMIN — IPRATROPIUM BROMIDE AND ALBUTEROL SULFATE 1 AMPULE: .5; 2.5 SOLUTION RESPIRATORY (INHALATION) at 20:40

## 2022-06-17 RX ADMIN — LANTHANUM CARBONATE 1000 MG: 500 TABLET, CHEWABLE ORAL at 18:47

## 2022-06-17 RX ADMIN — IPRATROPIUM BROMIDE AND ALBUTEROL SULFATE 1 AMPULE: .5; 2.5 SOLUTION RESPIRATORY (INHALATION) at 08:12

## 2022-06-17 RX ADMIN — PANTOPRAZOLE SODIUM 20 MG: 20 TABLET, DELAYED RELEASE ORAL at 05:10

## 2022-06-17 RX ADMIN — LATANOPROST 1 DROP: 50 SOLUTION OPHTHALMIC at 21:44

## 2022-06-17 RX ADMIN — LANTHANUM CARBONATE 1000 MG: 500 TABLET, CHEWABLE ORAL at 07:54

## 2022-06-17 RX ADMIN — ISOSORBIDE MONONITRATE 30 MG: 30 TABLET, EXTENDED RELEASE ORAL at 07:54

## 2022-06-17 RX ADMIN — SODIUM CHLORIDE, PRESERVATIVE FREE 10 ML: 5 INJECTION INTRAVENOUS at 07:54

## 2022-06-17 RX ADMIN — SODIUM CHLORIDE, PRESERVATIVE FREE 10 ML: 5 INJECTION INTRAVENOUS at 20:26

## 2022-06-17 RX ADMIN — LANTHANUM CARBONATE 1000 MG: 500 TABLET, CHEWABLE ORAL at 11:39

## 2022-06-17 RX ADMIN — DOXYCYCLINE HYCLATE 100 MG: 100 CAPSULE ORAL at 20:26

## 2022-06-17 RX ADMIN — BRIMONIDINE TARTRATE 1 DROP: 2 SOLUTION/ DROPS OPHTHALMIC at 08:49

## 2022-06-17 RX ADMIN — PIPERACILLIN AND TAZOBACTAM 4500 MG: 4; .5 INJECTION, POWDER, LYOPHILIZED, FOR SOLUTION INTRAVENOUS at 21:04

## 2022-06-17 RX ADMIN — HEPARIN SODIUM 5000 UNITS: 10000 INJECTION INTRAVENOUS; SUBCUTANEOUS at 08:01

## 2022-06-17 RX ADMIN — METOPROLOL SUCCINATE 25 MG: 25 TABLET, FILM COATED, EXTENDED RELEASE ORAL at 07:54

## 2022-06-17 RX ADMIN — TICAGRELOR 90 MG: 90 TABLET ORAL at 07:54

## 2022-06-17 RX ADMIN — TIMOLOL MALEATE 1 DROP: 5 SOLUTION OPHTHALMIC at 20:25

## 2022-06-17 RX ADMIN — TIMOLOL MALEATE 1 DROP: 5 SOLUTION OPHTHALMIC at 08:49

## 2022-06-17 RX ADMIN — SODIUM CHLORIDE 25 ML: 9 INJECTION, SOLUTION INTRAVENOUS at 21:03

## 2022-06-17 ASSESSMENT — ENCOUNTER SYMPTOMS
TROUBLE SWALLOWING: 0
PHOTOPHOBIA: 0
VOMITING: 0
ABDOMINAL PAIN: 0
DIARRHEA: 0
NAUSEA: 0
VOICE CHANGE: 0
WHEEZING: 0
SHORTNESS OF BREATH: 1
RHINORRHEA: 0

## 2022-06-17 ASSESSMENT — PAIN SCALES - GENERAL
PAINLEVEL_OUTOF10: 0
PAINLEVEL_OUTOF10: 0

## 2022-06-17 NOTE — PROGRESS NOTES
[address] [place] [address] [facility]  [facility]  Progress Note    SUBJECTIVE:  Chief Complaint   Patient presents with    Shortness of Breath     for a few days per pt    Altered Mental Status     per NH staff, pt able to answer all orientation questions     Patient is tolerating medications. No reported adverse drug reactions. No nausea, vomiting, diarrhea. Laying in bed, awakens but won't open her eyes  Has some back pain  Says she is coughing frequently with a lot of sputum production  No fevers overnight  Currently on room air and at hemodialysis  Review of systems:  As stated above in the chief complaint, otherwise negative.     Medications:  Scheduled Meds:   ipratropium-albuterol  1 ampule Inhalation 4x daily    doxycycline hyclate  100 mg Oral 2 times per day    [START ON 2022] bumetanide  2 mg Oral Once per day on Tue Thu Sat    isosorbide mononitrate  30 mg Oral Daily    latanoprost  1 drop Right Eye Nightly    metoprolol succinate  25 mg Oral Daily    pantoprazole  20 mg Oral QAM AC    ticagrelor  90 mg Oral BID    sodium chloride flush  5-40 mL IntraVENous 2 times per day    lanthanum  1,000 mg Oral TID WC    heparin (porcine)  5,000 Units SubCUTAneous TID    piperacillin-tazobactam  4,500 mg IntraVENous Q12H    brimonidine  1 drop Right Eye BID    timolol  1 drop Right Eye BID    aspirin  81 mg Oral Daily     Continuous Infusions:   sodium chloride      dextrose       PRN Meds:midodrine, sodium chloride flush, sodium chloride, ondansetron **OR** ondansetron, polyethylene glycol, acetaminophen **OR** acetaminophen, glucose, dextrose bolus **OR** dextrose bolus, glucagon (rDNA), dextrose    OBJECTIVE:  BP (!) 127/59   Pulse 65   Temp 98.4 °F (36.9 °C) (Oral)   Resp 17   Ht 5' 10\" (1.778 m)   Wt 162 lb 4.8 oz (73.6 kg)   SpO2 94%   BMI 23.29 kg/m²   Temp  Av.4 °F (36.9 °C)  Min: 98.1 °F (36.7 °C)  Max: 98.8 °F (37.1 °C)  Constitutional: The patient is awake, alert, and oriented. Laying in bed, sleepy  Skin: Warm and dry. No rashes were noted. HEENT: Round and reactive pupils. Moist mucous membranes. No ulcerations or thrush. Neck: Supple to movements. Chest: No use of accessory muscles to breathe. Symmetrical expansion. No wheezing, crackles. R mastectomy, + rhonchi   Cardiovascular: S1 and S2 are rhythmic and regular. No murmurs appreciated. Abdomen: Positive bowel sounds to auscultation. Benign to palpation. No masses felt. No hepatosplenomegaly.   Extremities: No clubbing, no cyanosis, no edema except for lymphedema  postmastectomy on the right  Lines: peripheral   R chest [facility]      Laboratory and Tests Review:  Lab Results   Component Value Date    WBC 6.6 06/17/2022    WBC 8.4 06/16/2022    WBC 12.2 (H) 06/15/2022    HGB 9.6 (L) 06/17/2022    HCT 31.5 (L) 06/17/2022    MCV 94.0 06/17/2022     06/17/2022     Lab Results   Component Value Date    NEUTROABS 4.81 06/17/2022    NEUTROABS 6.18 06/16/2022    NEUTROABS 10.53 (H) 06/15/2022     Lab Results   Component Value Date    CRPHS 0.7 08/16/2016    CRPHS 2.6 04/05/2016    CRPHS 7.6 (H) 01/28/2015     Lab Results   Component Value Date    ALT 16 06/15/2022    AST 19 06/15/2022    ALKPHOS 155 (H) 06/15/2022    BILITOT 0.7 06/15/2022     Lab Results   Component Value Date     06/17/2022    K 3.9 06/17/2022    CL 99 06/17/2022    CO2 25 06/17/2022    BUN 24 06/17/2022    CREATININE 4.0 06/17/2022    CREATININE 3.0 06/16/2022    CREATININE 2.5 06/15/2022    GFRAA 14 06/17/2022    LABGLOM 14 06/17/2022    GLUCOSE 122 06/17/2022    GLUCOSE 46 04/02/2012    PROT 6.7 06/15/2022    LABALBU 3.5 06/15/2022    LABALBU 3.8 04/02/2012    CALCIUM 8.9 06/17/2022    BILITOT 0.7 06/15/2022    ALKPHOS 155 06/15/2022    AST 19 06/15/2022    ALT 16 06/15/2022     Lab Results   Component Value Date    CRP 7.7 (H) 04/13/2022    CRP 0.7 (H) 01/20/2022    CRP 1.6 (H) 12/20/2021     Lab Results   Component Value Date SEDRATE 30 (H) 06/15/2022    SEDRATE 80 (H) 04/13/2022    SEDRATE 19 01/20/2022     Radiology:  Reviewed    Microbiology:   Respiratory Culture 6/16/22: few PMNL, moderate GPC in pairs  Blood Cultures 6/15/22: negative so far    ASSESSMENT:  · Aspiration pneumonia pneumonia/HCAP  · Vertebral osteomyelitis and infected hardware lumbar spine-suppressive doxycycline ongoing; just finished 6 weeks of Vanco with hemodialysis  · Respiratory viral panel positive for COVID by PCR; rapid test was negative but clinically CAT scan reflects bacterial pneumonia at this point I believe the COVID is asymptomatic. PLAN:  · Continue Zosyn and Doxycycline   · Will need to DC on Doxy for suppression  · Speech following: video swallow completed-laryngeal penetration with thin liquids  · Follow any evolution of COVID-19  · Check final cultures  · Monitor labs    GOYO Tong - CNP  9:10 AM  6/17/2022   Pt seen and examined. Above discussed agree with advanced practice nurse. Labs, cultures, and radiographs reviewed. Face to Face encounter occurred. Changes made as necessary.      Delonte Mcneal MD

## 2022-06-17 NOTE — PROGRESS NOTES
Initial Inpatient Wound Care    Admit Date: 6/15/2022  6:13 PM    Reason for consult:  diabetic patient with pain pump inserted lower back.  risk for breakdown/injury to that area. Right great toe amputated and tip of left middle finger amputated. Significant history: chronic osteo left hadn, esrd on JHD  Neuropathy, amp toe and finger  Findings:  Responsive, appears drowsy but responds when spoken to  Rt heel with swelling      No open areas  Left heel intact  Sacral buttocks intact  Pain pump rt lower back, outline prominent\  Amp sites well healed      Interventions in place:  Comfort glide    Plan:float heels, heel protectors  Monitor rt heel  keep patient off of pain pump site  Waffle cushion under pain pump site  Angelique Hurtado.  Cherry Gonzalez, GOYO - CNS 6/17/2022 9:12 AM

## 2022-06-17 NOTE — PROGRESS NOTES
Patient postive for Covid May 18th, admitted June 15th with a  positive Covid test result. [place] notified Infection Control.  Per infection control that patient does not need to be in isolation

## 2022-06-17 NOTE — CARE COORDINATION
Social Work / Discharge Planning :Maria Dolores from [facility] court house did verify patient will need therapy but does NOT have to wait for pre-cert. Patient from [facility]. Transport forms completed. N 17 generated. SW to follow.  Electronically signed by Sherolyn Canavan, LSW on 6/17/22 at 1:54 PM EDT

## 2022-06-17 NOTE — PROGRESS NOTES
[facility]  Nephrology Progress Note    Patient's Name: Ernestina Strong      History of Present Illness-full consult deferred as pt followed longitudinally at dialysis:    \"Claudia Zee is a 78 y.o. female with a past medical history of breast cancer, coronary artery disease, hypertension, hyperlipidemia, and anemia. She presented to the [id number] [facility] ED on 5/2 with complaints of back and abdominal pain patient was subsequently transferred to [facility] for NSTEMI. Patient is known to our service and dialyzes at [address] MWF 1st shift left AV fistula. In April she was Dx with DOMINICK zimmerman and is treated with iv Vanc  And has a mediport in the R  Pt is now admitted from the ED for cough and hypoxia. She came to the ED after having ongoing pain in the RLE when sitting, no significant pain when standing or using her walker. She notes since 6 days ago she had a cough productive of grey sputum. Resp Viral Panel (+) for SARS-CoV-2    6/17/22- awake and alert. She continues to have back pain. She had been in isolation for Covid last month at the nursing home, was asymptomatic after 5 days and was removed from isolation, although, she continues to test + here. PMH:    Past Medical History:   Diagnosis Date    Acute infection of bone ([address]     infection of rt foot, resolved.     Acute osteomyelitis of phalanx of left hand ([address] 1/27/2022    Left third distal phalanx    Anemia of chronic disease     Arthritis     Breast cancer ([address]     right breast, 2008/ bladder, 2006- last chemotherapy \"years ago\"    CAD (coronary artery disease)     Carpal tunnel syndrome     bilat - for OR left hand 3-17-20     Chronic diastolic CHF (congestive heart failure) ([address] 09/23/2014 9/23/14- echocardiogram revealed moderate LV concentric hypertrophy, stage III diastolic dysfunction, mild tricuspid regurgitation    CKD (chronic kidney disease) stage 4, GFR 15-29 ml/min ([facility])     Diabetic retinopathy ([address]     Glaucoma     Hemodialysis patient ([address]     [facility]- Dr. Lizbeth Glover - left arm fistula     Hyperkalemia, diminished renal excretion 11/9/2017    Hyperlipidemia     Hypertension     Hypoglycemia unawareness in type 1 diabetes mellitus ([address] 11/7/2017    Insulin dependent type 2 diabetes mellitus ([address]     Neuropathy     feet    Osteomyelitis due to secondary diabetes ([address]     rt great toe with amputation    Patient is Christianity 11/7/2017    Refusal of blood product     patient states she dose not take blood transfusion    Ventricular hypertrophy     Ventricular tachycardia ([address] 5/24/2021    Vitreous hemorrhage (HCC)     left eye     Patient Active Problem List   Diagnosis    Diabetic retinopathy ([address]    Malignant neoplasm of right female breast ([address]    Atherosclerosis of native coronary artery of native heart without angina pectoris    Moderate obesity    Left ventricular hypertrophy    Herniated lumbar intervertebral disc    Lumbar degenerative disc disease    Pseudomeningocele    Lumbar radiculopathy    Lymphedema of arm    Anemia of chronic disease    Chronic diastolic CHF (congestive heart failure) ([address]    Hypertension    Glaucoma    Refusal of blood product    Elevated troponin    Controlled type 2 diabetes mellitus with chronic kidney disease on chronic dialysis, with long-term current use of insulin (HCC)    Vitreous hemorrhage ([address]    Patient is Christianity    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 ([address]    Chronic pain syndrome    Lumbar post-laminectomy syndrome    Cervicalgia    Diabetic peripheral neuropathy ([address]    ESRD (end stage renal disease) ([address]    Bilateral carpal tunnel syndrome    Cardiac arrest ([address]    ESRD (end stage renal disease) on dialysis ([address]    Mixed hyperlipidemia    Lymphedema of right upper extremity    Coronary artery disease involving native coronary artery of native heart with angina pectoris [facility]    Mitral valve disease    CAD in native artery    Lumbar stenosis without neurogenic claudication    Intractable low back pain    Unable to ambulate    NSTEMI (non-ST elevated myocardial infarction) ([facility]    Moderate protein-calorie malnutrition (HCC)    AMS (altered mental status)    Ischemic cardiomyopathy    Healthcare-associated pneumonia    Hypoxia     Meds:     ipratropium-albuterol  1 ampule Inhalation 4x daily    doxycycline hyclate  100 mg Oral 2 times per day    [START ON 6/18/2022] bumetanide  2 mg Oral Once per day on Tue Thu Sat    isosorbide mononitrate  30 mg Oral Daily    latanoprost  1 drop Right Eye Nightly    metoprolol succinate  25 mg Oral Daily    pantoprazole  20 mg Oral QAM AC    ticagrelor  90 mg Oral BID    sodium chloride flush  5-40 mL IntraVENous 2 times per day    lanthanum  1,000 mg Oral TID WC    heparin (porcine)  5,000 Units SubCUTAneous TID    piperacillin-tazobactam  4,500 mg IntraVENous Q12H    brimonidine  1 drop Right Eye BID    timolol  1 drop Right Eye BID    aspirin  81 mg Oral Daily      sodium chloride      dextrose       Meds prn:     midodrine, sodium chloride flush, sodium chloride, ondansetron **OR** ondansetron, polyethylene glycol, acetaminophen **OR** acetaminophen, glucose, dextrose bolus **OR** dextrose bolus, glucagon (rDNA), dextrose    Meds prior to admission:     No current facility-administered medications on file prior to encounter. Current Outpatient Medications on File Prior to Encounter   Medication Sig Dispense Refill    gabapentin (NEURONTIN) 100 MG capsule Take 2 capsules by mouth 3 times daily for 180 days.  180 capsule 5    atorvastatin (LIPITOR) 80 MG tablet Take 80 mg by mouth nightly      bumetanide (BUMEX) 2 MG tablet Take 2 mg by mouth three times a week Given Sunday,Tuesday,Thursday      isosorbide mononitrate (IMDUR) 30 MG extended release tablet Take 30 mg by mouth daily      insulin glargine (LANTUS) 100 UNIT/ML injection vial Inject 5 Units into the skin nightly       metoprolol succinate (TOPROL XL) 25 MG extended release tablet Take 1 tablet by mouth daily 90 tablet 1    lanthanum (FOSRENOL) 1000 MG chewable tablet Take 1,000 mg by mouth 3 times daily (with meals)       allopurinol (ZYLOPRIM) 100 MG tablet Take 1 tablet by mouth daily 90 tablet 1    ticagrelor (BRILINTA) 90 MG TABS tablet Take 1 tablet by mouth 2 times daily 180 tablet 1    B Complex-C-Folic Acid (BONI CAPS) 1 MG CAPS Take 1 mg by mouth nightly       omeprazole (PRILOSEC) 20 MG delayed release capsule Take 20 mg by mouth daily as needed       LUMIGAN 0.01 % SOLN ophthalmic drops Place 1 drop into the right eye nightly       COMBIGAN 0.2-0.5 % ophthalmic solution Place 1 drop into the right eye every 12 hours   7    aspirin 81 MG EC tablet Take 1 tablet by mouth daily 30 tablet 0    midodrine (PROAMATINE) 5 MG tablet Take 5 mg by mouth daily as needed      lidocaine-prilocaine (EMLA) 2.5-2.5 % cream Apply topically as needed for Pain        Allergies:    Furosemide    Social History:     reports that she has never smoked. She has never used smokeless tobacco. She reports that she does not drink alcohol and does not use drugs.     Family History:         Problem Relation Age of Onset    Breast Cancer Mother 61    Hypertension Mother     Heart Disease Father     Prostate Cancer Father     Breast Cancer Maternal Grandmother 61     Physical Exam:    Patient Vitals for the past 24 hrs:   BP Temp Temp src Pulse Resp SpO2 Weight   06/17/22 0800 (!) 127/59 98.4 °F (36.9 °C) Oral 65 17 94 % --   06/17/22 0022 -- -- -- -- -- -- 162 lb 4.8 oz (73.6 kg)   06/17/22 0000 (!) 130/55 98.3 °F (36.8 °C) Oral 63 18 98 % --   06/16/22 1929 (!) 112/56 98.4 °F (36.9 °C) Oral 64 17 100 % --   06/16/22 1659 (!) 112/57 98.3 °F (36.8 °C) Oral 64 18 98 % --   06/16/22 1521 (!) 111/55 98.8 °F (37.1 °C) Oral 73 18 97 % --     No intake or output data in the 24 hours ending 06/17/22 1017  General: Awake, alert, no acute distress  Neck: No JVD noted  Lungs: scattered crackles,  Mediport in the R chest  CV: S1 S2 no S3 or rub  Abd: Soft, nontender, nondistended. Active bowel sounds  Skin: Warm and dry. No rash on exposed extremities  Ext: 1+ RUE edema (h/o breast CA); No BLE edema ; left AV fistula in the upper ext  Neuro: Awake, answers questions appropriately    Data:    Recent Labs     06/15/22  1914 06/16/22  0350 06/17/22  0225   WBC 12.2* 8.4 6.6   HGB 10.3* 9.6* 9.6*   HCT 34.2 31.6* 31.5*   MCV 93.7 93.8 94.0    143 152     Recent Labs     06/15/22  2022 06/16/22  0350 06/17/22  0225    136 137   K 3.9 3.9 3.9   CL 99 98 99   CO2 25 27 25   CREATININE 2.5* 3.0* 4.0*   BUN 12 14 24*   LABGLOM 23 19 14   GLUCOSE 93 91 122*   CALCIUM 8.9 8.9 8.9   PHOS 2.4* 2.6 2.8   MG 2.2 2.4 2.3     Vit D, 25-Hydroxy   Date Value Ref Range Status   03/11/2022 28 (L) 30 - 100 ng/mL Final     Comment:     <20 ng/mL. ........... Algis Broaden Deficient  20-30 ng/mL. ......... Algis Broaden Insufficient   ng/mL. ........ Algis Broaden Sufficient  >100 ng/mL. .......... Algis Broaden Toxic       PTH   Date Value Ref Range Status   03/11/2022 241 (H) 15 - 65 pg/mL Final     Recent Labs     06/15/22  2022   ALT 16   AST 19   ALKPHOS 155*   BILITOT 0.7     Recent Labs     06/15/22  2022   LABALBU 3.5     Ferritin   Date Value Ref Range Status   06/15/2022 2,221 ng/mL Final     Comment:     FERRITIN Reference Ranges:  Adult Males   20 - 60 years:    27 - 400 ng/mL  Adult females 16 - 61 years:    15 - 150 ng/mL  Adults greater than 60 years:   no established reference range  Pediatrics:                     no established reference range       Iron   Date Value Ref Range Status   06/15/2022 31 (L) 37 - 145 mcg/dL Final     TIBC   Date Value Ref Range Status   06/15/2022 139 (L) 250 - 450 mcg/dL Final     Vitamin B-12   Date Value Ref Range Status   02/17/2017 1802 (H) 618 - 662 pg/mL Final     Folate   Date Value Ref Range Status   02/17/2017 >20.0 4.8 - 24.2 ng/mL Final     Lab Results   Component Value Date    COLORU Yellow 11/07/2017    NITRU Negative 11/07/2017    GLUCOSEU Negative 11/07/2017    GLUCOSEU NEGATIVE 08/27/2011    KETUA Negative 11/07/2017    UROBILINOGEN 0.2 11/07/2017    BILIRUBINUR Negative 11/07/2017    BILIRUBINUR NEGATIVE 08/27/2011     No results found for: ALVINO, CREURRAN, MACREATRATIO, OSMOU    No components found for: URIC    No results found for: LIPIDPAN    Assessment and Plans:    1. ESKD on HD  OP [facility] MWF 1st shift   left AV fistula  PLAN:  1. Continue HD MWF  2. For treatment today    2. Recent  NSTEMI  S/p cardiac catheterization with balloon angioplasty 5/5  Cardiology previously following    3. Back pain  S/p bone biopsy 4/18 for lumbar spine osteomyelitis  Had been on vancomycin for this  Now on zosyn and vibramycin   ID following    4. Hypertension  BP goal<140/90  BP at goal  Monitor on current regimen and with HD    5. Anemia  Hemoglobin target 10-12  Hemoglobin 9.6 today  Continue JAYSHREE as hemoglobin below target  Transfuse for hemoglobin<7  Monitor H&H    6.  Secondary hyperparathyroidism of renal origin  PTH noted in the outpatient setting on 4/25/2022 was 443  Phosphorus 2.8  Continue on Fosrenol  Monitor labs    7. COVID 19 Pne    GOYO Sen - CNP   Patient seen and examined. I had a face to face encounter with the patient. Pt states she feels somewhat better  Agree with exam.    Agree with  formulation, assessment and plan as outlined above and directed by me. Addition and corrections were done as deemed appropriate. My exam and plan include:     A/P:  1. ESKD -for dialysis this PM-follow labs  Continue current treatment.     TRES Flores MD

## 2022-06-17 NOTE — PLAN OF CARE
Problem: Skin/Tissue Integrity  Goal: Absence of new skin breakdown  Description: 1. Monitor for areas of redness and/or skin breakdown  2. Assess vascular access sites hourly  3. Every 4-6 hours minimum:  Change oxygen saturation probe site  4. Every 4-6 hours:  If on nasal continuous positive airway pressure, respiratory therapy assess nares and determine need for appliance change or resting period.   6/16/2022 2314 by Court Garvey RN  Outcome: Progressing  6/16/2022 1031 by Reece Koenig RN  Outcome: Progressing     Problem: Safety - Adult  Goal: Free from fall injury  6/16/2022 2314 by Court Garvey RN  Outcome: Progressing  Flowsheets (Taken 6/16/2022 1929)  Free From Fall Injury: Instruct family/caregiver on patient safety  6/16/2022 1031 by Reece Koenig RN  Outcome: Progressing     Problem: ABCDS Injury Assessment  Goal: Absence of physical injury  6/16/2022 2314 by Court Garvey RN  Outcome: Progressing  Flowsheets (Taken 6/16/2022 1929)  Absence of Physical Injury: Implement safety measures based on patient assessment  6/16/2022 1031 by Reece Koenig RN  Outcome: Progressing     Problem: Chronic Conditions and Co-morbidities  Goal: Patient's chronic conditions and co-morbidity symptoms are monitored and maintained or improved  6/16/2022 2314 by Court Garvey RN  Outcome: Progressing  6/16/2022 1031 by Reece Koenig RN  Outcome: Progressing     Problem: Pain  Goal: Verbalizes/displays adequate comfort level or baseline comfort level  6/16/2022 2314 by Court Garvey RN  Outcome: Progressing  6/16/2022 1031 by Reece Koenig RN  Outcome: Progressing

## 2022-06-17 NOTE — FLOWSHEET NOTE
06/17/22 1730   Vital Signs   /73   Temp 97.9 °F (36.6 °C)   Heart Rate 63   Resp 20   Weight 156 lb 8.4 oz (71 kg)   Weight Method Bed scale   Percent Weight Change -2.47   Pain Assessment   Pain Assessment None - Denies Pain   Post-Hemodialysis Assessment   Post-Treatment Procedures Blood returned; Access bleeding time < 10 minutes   Machine Disinfection Process Acid/Vinegar Clean;Exterior Machine Disinfection; Heat Disinfect   Rinseback Volume (ml) 300 ml   Total Liters Processed (l/min) 64 l/min   Dialyzer Clearance Lightly streaked   Duration of Treatment (minutes) 210 minutes   Heparin amount administered during treatment (units) 0 units   Hemodialysis Intake (ml) 300 ml   Hemodialysis Output (ml) 2100 ml   NET Removed (ml) 1800   Tolerated Treatment Good   Patient Response to Treatment tolerated treatment , stable vitals post   Bilateral Breath Sounds Diminished   Edema Generalized   Edema Generalized +1   Time Off 1730   Patient Disposition Return to room

## 2022-06-18 LAB
ANION GAP SERPL CALCULATED.3IONS-SCNC: 13 MMOL/L (ref 7–16)
BASOPHILS ABSOLUTE: 0.03 E9/L (ref 0–0.2)
BASOPHILS RELATIVE PERCENT: 0.6 % (ref 0–2)
BUN BLDV-MCNC: 13 MG/DL (ref 6–23)
CALCIUM SERPL-MCNC: 8.9 MG/DL (ref 8.6–10.2)
CHLORIDE BLD-SCNC: 99 MMOL/L (ref 98–107)
CO2: 25 MMOL/L (ref 22–29)
CREAT SERPL-MCNC: 2.8 MG/DL (ref 0.5–1)
CULTURE, RESPIRATORY: NORMAL
EOSINOPHILS ABSOLUTE: 0.19 E9/L (ref 0.05–0.5)
EOSINOPHILS RELATIVE PERCENT: 4 % (ref 0–6)
GFR AFRICAN AMERICAN: 20
GFR NON-AFRICAN AMERICAN: 20 ML/MIN/1.73
GLUCOSE BLD-MCNC: 86 MG/DL (ref 74–99)
HCT VFR BLD CALC: 31.2 % (ref 34–48)
HEMOGLOBIN: 9.5 G/DL (ref 11.5–15.5)
IMMATURE GRANULOCYTES #: 0.02 E9/L
IMMATURE GRANULOCYTES %: 0.4 % (ref 0–5)
LYMPHOCYTES ABSOLUTE: 0.96 E9/L (ref 1.5–4)
LYMPHOCYTES RELATIVE PERCENT: 20.4 % (ref 20–42)
MAGNESIUM: 2.1 MG/DL (ref 1.6–2.6)
MCH RBC QN AUTO: 29.1 PG (ref 26–35)
MCHC RBC AUTO-ENTMCNC: 30.4 % (ref 32–34.5)
MCV RBC AUTO: 95.7 FL (ref 80–99.9)
METER GLUCOSE: 110 MG/DL (ref 74–99)
METER GLUCOSE: 112 MG/DL (ref 74–99)
METER GLUCOSE: 113 MG/DL (ref 74–99)
METER GLUCOSE: 132 MG/DL (ref 74–99)
METER GLUCOSE: 64 MG/DL (ref 74–99)
MONOCYTES ABSOLUTE: 0.49 E9/L (ref 0.1–0.95)
MONOCYTES RELATIVE PERCENT: 10.4 % (ref 2–12)
NEUTROPHILS ABSOLUTE: 3.01 E9/L (ref 1.8–7.3)
NEUTROPHILS RELATIVE PERCENT: 64.2 % (ref 43–80)
PDW BLD-RTO: 18.8 FL (ref 11.5–15)
PHOSPHORUS: 2.1 MG/DL (ref 2.5–4.5)
PLATELET # BLD: 165 E9/L (ref 130–450)
PMV BLD AUTO: 10.9 FL (ref 7–12)
POTASSIUM REFLEX MAGNESIUM: 3.7 MMOL/L (ref 3.5–5)
RBC # BLD: 3.26 E12/L (ref 3.5–5.5)
SMEAR, RESPIRATORY: NORMAL
SODIUM BLD-SCNC: 137 MMOL/L (ref 132–146)
WBC # BLD: 4.7 E9/L (ref 4.5–11.5)

## 2022-06-18 PROCEDURE — 36415 COLL VENOUS BLD VENIPUNCTURE: CPT

## 2022-06-18 PROCEDURE — 6370000000 HC RX 637 (ALT 250 FOR IP): Performed by: FAMILY MEDICINE

## 2022-06-18 PROCEDURE — 84100 ASSAY OF PHOSPHORUS: CPT

## 2022-06-18 PROCEDURE — 94668 MNPJ CHEST WALL SBSQ: CPT

## 2022-06-18 PROCEDURE — 83735 ASSAY OF MAGNESIUM: CPT

## 2022-06-18 PROCEDURE — 2700000000 HC OXYGEN THERAPY PER DAY

## 2022-06-18 PROCEDURE — 6360000002 HC RX W HCPCS: Performed by: FAMILY MEDICINE

## 2022-06-18 PROCEDURE — 80048 BASIC METABOLIC PNL TOTAL CA: CPT

## 2022-06-18 PROCEDURE — 82962 GLUCOSE BLOOD TEST: CPT

## 2022-06-18 PROCEDURE — 2060000000 HC ICU INTERMEDIATE R&B

## 2022-06-18 PROCEDURE — 85025 COMPLETE CBC W/AUTO DIFF WBC: CPT

## 2022-06-18 PROCEDURE — 99232 SBSQ HOSP IP/OBS MODERATE 35: CPT | Performed by: FAMILY MEDICINE

## 2022-06-18 PROCEDURE — 2580000003 HC RX 258: Performed by: FAMILY MEDICINE

## 2022-06-18 PROCEDURE — 94640 AIRWAY INHALATION TREATMENT: CPT

## 2022-06-18 PROCEDURE — 6370000000 HC RX 637 (ALT 250 FOR IP): Performed by: SPECIALIST

## 2022-06-18 PROCEDURE — 97161 PT EVAL LOW COMPLEX 20 MIN: CPT

## 2022-06-18 RX ORDER — GUAIFENESIN 400 MG/1
400 TABLET ORAL 4 TIMES DAILY PRN
Status: DISCONTINUED | OUTPATIENT
Start: 2022-06-18 | End: 2022-06-29 | Stop reason: HOSPADM

## 2022-06-18 RX ADMIN — SODIUM CHLORIDE, PRESERVATIVE FREE 10 ML: 5 INJECTION INTRAVENOUS at 20:43

## 2022-06-18 RX ADMIN — HEPARIN SODIUM 5000 UNITS: 10000 INJECTION INTRAVENOUS; SUBCUTANEOUS at 20:46

## 2022-06-18 RX ADMIN — IPRATROPIUM BROMIDE AND ALBUTEROL SULFATE 1 AMPULE: .5; 2.5 SOLUTION RESPIRATORY (INHALATION) at 20:12

## 2022-06-18 RX ADMIN — ISOSORBIDE MONONITRATE 30 MG: 30 TABLET, EXTENDED RELEASE ORAL at 09:45

## 2022-06-18 RX ADMIN — SODIUM CHLORIDE, PRESERVATIVE FREE 10 ML: 5 INJECTION INTRAVENOUS at 09:44

## 2022-06-18 RX ADMIN — LATANOPROST 1 DROP: 50 SOLUTION OPHTHALMIC at 20:43

## 2022-06-18 RX ADMIN — DOXYCYCLINE HYCLATE 100 MG: 100 CAPSULE ORAL at 09:45

## 2022-06-18 RX ADMIN — BRIMONIDINE TARTRATE 1 DROP: 2 SOLUTION/ DROPS OPHTHALMIC at 09:46

## 2022-06-18 RX ADMIN — IPRATROPIUM BROMIDE AND ALBUTEROL SULFATE 1 AMPULE: .5; 2.5 SOLUTION RESPIRATORY (INHALATION) at 09:08

## 2022-06-18 RX ADMIN — TICAGRELOR 90 MG: 90 TABLET ORAL at 09:45

## 2022-06-18 RX ADMIN — DOXYCYCLINE HYCLATE 100 MG: 100 CAPSULE ORAL at 20:43

## 2022-06-18 RX ADMIN — BUMETANIDE 2 MG: 1 TABLET ORAL at 09:45

## 2022-06-18 RX ADMIN — IPRATROPIUM BROMIDE AND ALBUTEROL SULFATE 1 AMPULE: .5; 2.5 SOLUTION RESPIRATORY (INHALATION) at 16:35

## 2022-06-18 RX ADMIN — BRIMONIDINE TARTRATE 1 DROP: 2 SOLUTION/ DROPS OPHTHALMIC at 20:43

## 2022-06-18 RX ADMIN — HEPARIN SODIUM 5000 UNITS: 10000 INJECTION INTRAVENOUS; SUBCUTANEOUS at 15:33

## 2022-06-18 RX ADMIN — TIMOLOL MALEATE 1 DROP: 5 SOLUTION OPHTHALMIC at 10:34

## 2022-06-18 RX ADMIN — TIMOLOL MALEATE 1 DROP: 5 SOLUTION OPHTHALMIC at 20:43

## 2022-06-18 RX ADMIN — ASPIRIN 81 MG CHEWABLE TABLET 81 MG: 81 TABLET CHEWABLE at 09:45

## 2022-06-18 RX ADMIN — HEPARIN SODIUM 5000 UNITS: 10000 INJECTION INTRAVENOUS; SUBCUTANEOUS at 10:23

## 2022-06-18 RX ADMIN — PIPERACILLIN AND TAZOBACTAM 4500 MG: 4; .5 INJECTION, POWDER, LYOPHILIZED, FOR SOLUTION INTRAVENOUS at 10:21

## 2022-06-18 RX ADMIN — SODIUM CHLORIDE 25 ML: 9 INJECTION, SOLUTION INTRAVENOUS at 20:52

## 2022-06-18 RX ADMIN — IPRATROPIUM BROMIDE AND ALBUTEROL SULFATE 1 AMPULE: .5; 2.5 SOLUTION RESPIRATORY (INHALATION) at 12:31

## 2022-06-18 RX ADMIN — LANTHANUM CARBONATE 1000 MG: 500 TABLET, CHEWABLE ORAL at 09:44

## 2022-06-18 RX ADMIN — PANTOPRAZOLE SODIUM 20 MG: 20 TABLET, DELAYED RELEASE ORAL at 06:41

## 2022-06-18 RX ADMIN — TICAGRELOR 90 MG: 90 TABLET ORAL at 20:43

## 2022-06-18 RX ADMIN — PIPERACILLIN AND TAZOBACTAM 4500 MG: 4; .5 INJECTION, POWDER, LYOPHILIZED, FOR SOLUTION INTRAVENOUS at 20:53

## 2022-06-18 NOTE — PROGRESS NOTES
Secure message sent to [id number] [facility] re:  Full Disclosure received from CMR  AFIB RVR/ST with IVCD

## 2022-06-18 NOTE — PROGRESS NOTES
[facility]  Nephrology Progress Note    Patient's Name: Christian Vee      History of Present Illness-full consult deferred as pt followed longitudinally at dialysis:    \"Claudia Pitts is a 78 y.o. female with a past medical history of breast cancer, coronary artery disease, hypertension, hyperlipidemia, and anemia. She presented to the [address] [facility] ED on  with complaints of back and abdominal pain patient was subsequently transferred to [facility] for NSTEMI. Patient is known to our service and dialyzes at [address] MWF 1st shift left AV fistula. In April she was Dx with DOMINICK zimmerman and is treated with iv Vanc  And has a mediport in the R  Pt is now admitted from the ED for cough and hypoxia. She came to the ED after having ongoing pain in the RLE when sitting, no significant pain when standing or using her walker. She notes since 6 days ago she had a cough productive of grey sputum. Resp Viral Panel (+) for SARS-CoV-2    22- awake and alert. Feels tired today, ongoing back pain. PMH:    Past Medical History:   Diagnosis Date    Acute infection of bone ([address]     infection of rt foot, resolved.     Acute osteomyelitis of phalanx of left hand ([address] 2022    Left third distal phalanx    Anemia of chronic disease     Arthritis     Breast cancer ([address]     right breast, 2008/ bladder, 2006- last chemotherapy \"years ago\"    CAD (coronary artery disease)     Carpal tunnel syndrome     bilat - for OR left hand 3-17-20     Chronic diastolic CHF (congestive heart failure) ([address] 2014- echocardiogram revealed moderate LV concentric hypertrophy, stage III diastolic dysfunction, mild tricuspid regurgitation    CKD (chronic kidney disease) stage 4, GFR 15-29 ml/min ([facility])     Diabetic retinopathy ([address]     Glaucoma     Hemodialysis patient ([address]     [facility] - Dr. Dwight Garza - left arm fistula     Hyperkalemia, diminished renal excretion 11/9/2017    Hyperlipidemia     Hypertension     Hypoglycemia unawareness in type 1 diabetes mellitus ([address] 11/7/2017    Insulin dependent type 2 diabetes mellitus (HCC)     Neuropathy     feet    Osteomyelitis due to secondary diabetes ([address]     rt great toe with amputation    Patient is Holiness 11/7/2017    Refusal of blood product     patient states she dose not take blood transfusion    Ventricular hypertrophy     Ventricular tachycardia ([address] 5/24/2021    Vitreous hemorrhage (HCC)     left eye     Patient Active Problem List   Diagnosis    Diabetic retinopathy ([address]    Malignant neoplasm of right female breast ([address]    Atherosclerosis of native coronary artery of native heart without angina pectoris    Moderate obesity    Left ventricular hypertrophy    Herniated lumbar intervertebral disc    Lumbar degenerative disc disease    Pseudomeningocele    Lumbar radiculopathy    Lymphedema of arm    Anemia of chronic disease    Chronic diastolic CHF (congestive heart failure) ([address]    Hypertension    Glaucoma    Refusal of blood product    Elevated troponin    Controlled type 2 diabetes mellitus with chronic kidney disease on chronic dialysis, with long-term current use of insulin (HCC)    Vitreous hemorrhage ([address]    Patient is Holiness    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 ([address]    Chronic pain syndrome    Lumbar post-laminectomy syndrome    Cervicalgia    Diabetic peripheral neuropathy ([address]    ESRD (end stage renal disease) ([address]    Bilateral carpal tunnel syndrome    Cardiac arrest ([address]    ESRD (end stage renal disease) on dialysis ([address]    Mixed hyperlipidemia    Lymphedema of right upper extremity    Coronary artery disease involving native coronary artery of native heart with angina pectoris ([address]    Mitral valve disease    CAD in native artery    Lumbar stenosis without neurogenic claudication    Intractable low back pain    Unable to ambulate    NSTEMI (non-ST elevated myocardial infarction) (HCC)    Moderate protein-calorie malnutrition (HCC)    AMS (altered mental status)    Ischemic cardiomyopathy    Healthcare-associated pneumonia    Hypoxia     Meds:     ipratropium-albuterol  1 ampule Inhalation 4x daily    doxycycline hyclate  100 mg Oral 2 times per day    bumetanide  2 mg Oral Once per day on Tue Thu Sat    isosorbide mononitrate  30 mg Oral Daily    latanoprost  1 drop Right Eye Nightly    metoprolol succinate  25 mg Oral Daily    pantoprazole  20 mg Oral QAM AC    ticagrelor  90 mg Oral BID    sodium chloride flush  5-40 mL IntraVENous 2 times per day    lanthanum  1,000 mg Oral TID WC    heparin (porcine)  5,000 Units SubCUTAneous TID    piperacillin-tazobactam  4,500 mg IntraVENous Q12H    brimonidine  1 drop Right Eye BID    timolol  1 drop Right Eye BID    aspirin  81 mg Oral Daily      sodium chloride 25 mL (06/17/22 2103)    dextrose       Meds prn:     guaiFENesin, midodrine, sodium chloride flush, sodium chloride, ondansetron **OR** ondansetron, polyethylene glycol, acetaminophen **OR** acetaminophen, glucose, dextrose bolus **OR** dextrose bolus, glucagon (rDNA), dextrose    Meds prior to admission:     No current facility-administered medications on file prior to encounter. Current Outpatient Medications on File Prior to Encounter   Medication Sig Dispense Refill    gabapentin (NEURONTIN) 100 MG capsule Take 2 capsules by mouth 3 times daily for 180 days.  180 capsule 5    atorvastatin (LIPITOR) 80 MG tablet Take 80 mg by mouth nightly      bumetanide (BUMEX) 2 MG tablet Take 2 mg by mouth three times a week Given Sunday,Tuesday,Thursday      isosorbide mononitrate (IMDUR) 30 MG extended release tablet Take 30 mg by mouth daily      insulin glargine (LANTUS) 100 UNIT/ML injection vial Inject 5 Units into the skin nightly       metoprolol succinate (TOPROL XL) 25 MG extended release tablet Take 1 tablet by mouth daily 90 tablet 1    lanthanum (FOSRENOL) 1000 MG chewable tablet Take 1,000 mg by mouth 3 times daily (with meals)       allopurinol (ZYLOPRIM) 100 MG tablet Take 1 tablet by mouth daily 90 tablet 1    ticagrelor (BRILINTA) 90 MG TABS tablet Take 1 tablet by mouth 2 times daily 180 tablet 1    B Complex-C-Folic Acid (BONI CAPS) 1 MG CAPS Take 1 mg by mouth nightly       omeprazole (PRILOSEC) 20 MG delayed release capsule Take 20 mg by mouth daily as needed       LUMIGAN 0.01 % SOLN ophthalmic drops Place 1 drop into the right eye nightly       COMBIGAN 0.2-0.5 % ophthalmic solution Place 1 drop into the right eye every 12 hours   7    aspirin 81 MG EC tablet Take 1 tablet by mouth daily 30 tablet 0    midodrine (PROAMATINE) 5 MG tablet Take 5 mg by mouth daily as needed      lidocaine-prilocaine (EMLA) 2.5-2.5 % cream Apply topically as needed for Pain        Allergies:    Furosemide    Social History:     reports that she has never smoked. She has never used smokeless tobacco. She reports that she does not drink alcohol and does not use drugs.     Family History:         Problem Relation Age of Onset    Breast Cancer Mother 61    Hypertension Mother     Heart Disease Father     Prostate Cancer Father     Breast Cancer Maternal Grandmother 61     Physical Exam:    Patient Vitals for the past 24 hrs:   BP Temp Temp src Pulse Resp SpO2 Weight   06/18/22 0245 (!) 118/41 98.2 °F (36.8 °C) Oral 64 16 100 % --   06/18/22 0050 -- -- -- -- -- -- 154 lb 4.8 oz (70 kg)   06/17/22 2045 119/86 98.1 °F (36.7 °C) Oral 66 16 99 % --   06/17/22 1848 119/60 98.4 °F (36.9 °C) Oral 57 16 95 % --   06/17/22 1730 109/73 97.9 °F (36.6 °C) -- 63 20 -- 156 lb 8.4 oz (71 kg)   06/17/22 1700 (!) 159/37 -- -- 71 -- -- --   06/17/22 1630 (!) 144/43 -- -- 60 -- -- --   06/17/22 1600 (!) 145/39 -- -- 59 -- -- --   06/17/22 1530 (!) 148/51 -- -- 51 -- -- --   06/17/22 1459 (!) 148/40 -- -- 59 -- -- --   06/17/22 1430 (!) 134/51 -- -- 58 -- -- --   06/17/22 1400 (!) 155/41 -- -- 60 -- -- --   06/17/22 1352 (!) 132/48 97.9 °F (36.6 °C) -- 61 18 -- 160 lb 7.9 oz (72.8 kg)   06/17/22 1229 -- -- -- 63 16 91 % --       Intake/Output Summary (Last 24 hours) at 6/18/2022 0826  Last data filed at 6/17/2022 1730  Gross per 24 hour   Intake 400 ml   Output 2100 ml   Net -1700 ml     General: Awake, alert, no acute distress  Neck: No JVD noted  Lungs: diminished  Mediport in the R chest  CV: S1 S2 no S3 or rub  Abd: Soft, nontender, nondistended. Active bowel sounds  Skin: Warm and dry. No rash on exposed extremities  Ext: 1+ RUE edema (h/o breast CA); No BLE edema ; left AV fistula in the upper ext  Neuro: Awake, answers questions appropriately    Data:    Recent Labs     06/16/22  0350 06/17/22  0225 06/18/22  0200   WBC 8.4 6.6 4.7   HGB 9.6* 9.6* 9.5*   HCT 31.6* 31.5* 31.2*   MCV 93.8 94.0 95.7    152 165     Recent Labs     06/16/22  0350 06/17/22  0225 06/18/22  0200    137 137   K 3.9 3.9 3.7   CL 98 99 99   CO2 27 25 25   CREATININE 3.0* 4.0* 2.8*   BUN 14 24* 13   LABGLOM 19 14 20   GLUCOSE 91 122* 86   CALCIUM 8.9 8.9 8.9   PHOS 2.6 2.8 2.1*   MG 2.4 2.3 2.1     Vit D, 25-Hydroxy   Date Value Ref Range Status   03/11/2022 28 (L) 30 - 100 ng/mL Final     Comment:     <20 ng/mL. ........... Pinky Angles Deficient  20-30 ng/mL. ......... Pinky Angles Insufficient   ng/mL. ........ Pinky Angles Sufficient  >100 ng/mL. .......... Pinky Angles Toxic       PTH   Date Value Ref Range Status   03/11/2022 241 (H) 15 - 65 pg/mL Final     Recent Labs     06/15/22  2022   ALT 16   AST 19   ALKPHOS 155*   BILITOT 0.7     Recent Labs     06/15/22  2022   LABALBU 3.5     Ferritin   Date Value Ref Range Status   06/15/2022 2,221 ng/mL Final     Comment:     FERRITIN Reference Ranges:  Adult Males   20 - 60 years:    27 - 400 ng/mL  Adult females 16 - 61 years:    15 - 150 ng/mL  Adults greater than 60 years:   no established reference range  Pediatrics:                     no established reference range       Iron   Date Value Ref Range Status   06/15/2022 31 (L) 37 - 145 mcg/dL Final     TIBC   Date Value Ref Range Status   06/15/2022 139 (L) 250 - 450 mcg/dL Final     Vitamin B-12   Date Value Ref Range Status   02/17/2017 1802 (H) 211 - 946 pg/mL Final     Folate   Date Value Ref Range Status   02/17/2017 >20.0 4.8 - 24.2 ng/mL Final     Lab Results   Component Value Date    COLORU Yellow 11/07/2017    NITRU Negative 11/07/2017    GLUCOSEU Negative 11/07/2017    GLUCOSEU NEGATIVE 08/27/2011    KETUA Negative 11/07/2017    UROBILINOGEN 0.2 11/07/2017    BILIRUBINUR Negative 11/07/2017    BILIRUBINUR NEGATIVE 08/27/2011     No results found for: ALVINO, CREURRAN, MACREATRATIO, OSMOU    No components found for: URIC    No results found for: LIPIDPAN    Assessment and Plans:    1. ESKD on HD  OP [facility] MWF 1st shift   left AV fistula  PLAN:  1. Continue HD MWF  2. Next treatment 6/20    2. Recent  NSTEMI  S/p cardiac catheterization with balloon angioplasty 5/5  Cardiology previously following    3. Back pain  S/p bone biopsy 4/18 for lumbar spine osteomyelitis  Had been on vancomycin for this  Now on zosyn and vibramycin   ID following    4. Hypertension  BP goal<140/90  BP at goal  Monitor on current regimen and with HD    5.   Anemia  Hemoglobin target 10-12  Hemoglobin 9.5 today  Continue JAYSHREE as hemoglobin below target  Transfuse for hemoglobin<7  Monitor H&H    6.  Secondary hyperparathyroidism of renal origin  PTH noted in the outpatient setting on 4/25/2022 was 443  Phosphorus 2.1  Will hold Fosrenol and recheck PO4 in the am  Monitor labs    GOYO Dale CNP       ==========================    Renal Attending Addendum    Seen and examined   Agree with nurse practitioner note above    Will follow daily for dialysis needs  Tentative plan is for treatment on Monday  She has been tolerating dialysis well  Hemoglobin below goal.  We will need to confirm JAYSHREE dosing for outpatient clinic  She has hypophosphatemia.   Hold binders and recheck phosphorus  Otherwise, per nurse practitioner note above    Cathleen Martinez MD

## 2022-06-18 NOTE — PROGRESS NOTES
Physical Therapy  Facility/Department: [address] INTERMEDIATE  Physical Therapy Initial Assessment    Name: Kimberley Benitez  : 1955  MRN: [id number]  Date of Service: 2022    Patient Diagnosis(es): The encounter diagnosis was Acute respiratory failure with hypoxia ([address] Past Medical History:  has a past medical history of Acute infection of bone ([address] Acute osteomyelitis of phalanx of left hand ([address] Anemia of chronic disease, Arthritis, Breast cancer ([address] CAD (coronary artery disease), Carpal tunnel syndrome, Chronic diastolic CHF (congestive heart failure) ([address] CKD (chronic kidney disease) stage 4, GFR 15-29 ml/min ([address] Diabetic retinopathy ([address] Glaucoma, Hemodialysis patient ([address] Hyperkalemia, diminished renal excretion, Hyperlipidemia, Hypertension, Hypoglycemia unawareness in type 1 diabetes mellitus ([address] Insulin dependent type 2 diabetes mellitus ([address] Neuropathy, Osteomyelitis due to secondary diabetes [facility]), Patient is Sabianism, Refusal of blood product, Ventricular hypertrophy, Ventricular tachycardia ([address] and Vitreous hemorrhage ([address] Past Surgical History:  has a past surgical history that includes Cholecystectomy; amputation; Mastectomy; Cystoscopy; Cataract removal; Ankle surgery; other surgical history (2011); ECHO Compl W Dop Color Flow (2013); Echo Complete (2013); spinal cord decompression; other surgical history (insertion lumbar drain insertion); other surgical history (10/22/2015); other surgical history (2015); Tunneled venous catheter placement (11/15/2017); Dialysis fistula creation (Left, 2018); vitrectomy (Left, 04/10/2018); pr rpr retinal dtchmnt w/vitrectomy any meth (Left, 04/10/2018); pr av anast,up arm basilic vein transposit (Left, 05/15/2018); pr ligatn angioaccess av fistula (Left, 2018); Colonoscopy; Carpal tunnel release (Left, 2020); transesophageal echocardiogram (2021);  Cardiac catheterization (12/09/2021); Finger amputation (Left, 01/20/2022); bone biopsy (N/A, 04/18/2022); transesophageal echocardiogram (04/19/2022); and Coronary angioplasty (05/05/2022). Referring provider:  Phyllis Mar MD    PT Order:  PT eval and treat     Evaluating PT:  Linda Epperson, PT, DPT PT [id number]    Room #:  [address] [place] [address]-  Diagnosis:  AMS (altered mental status) [R41.82]  Precautions:  Fall risk, O2  Equipment Needs:  None. SUBJECTIVE:    Pt admitted from nursing facility. Pt reported she was ambulating with a walker and assist with PT.      OBJECTIVE:   Initial Evaluation  Date: 6/18 Treatment Short Term/ Long Term   Goals   Was pt agreeable to Eval/treatment? yes     Does pt have pain? Back and B LEs which increased with mobility. Bed Mobility  Rolling: NT  Supine to sit: Min A  Sit to supine: SBA  Scooting: Min A to sitting EOB  SBA   Transfers Sit to stand: Min A  Stand to sit: Min A  Stand pivot: NT  SBA   Ambulation    10 feet with w/w Mod A   50 feet with w/w SBA    Stair negotiation: ascended and descended  NT      ROM BLE:  WFL     Strength BLE:  Grossly 4-/5  Grossly 4/5   Balance Sitting EOB:  supervision  Dynamic Standing: Mod A with w/w  Sitting EOB:  independent  Dynamic Standing:  SBA with w/w   AM-PAC 6 Clicks 03/68       Pt is A & O x 3  Sensation:  Pt denies numbness and tingling to extremities    Patient education  Pt educated on PT objectives during eval and while in the hospital, hand placement during transfers, safety during mobility.     Patient response to education:   Pt verbalized understanding Pt demonstrated skill Pt requires further education in this area    yes With cueing yes     ASSESSMENT:    Conditions Requiring Skilled Therapeutic Intervention:    [x]Decreased strength     []Decreased ROM  [x]Decreased functional mobility  [x]Decreased balance   [x]Decreased endurance   [x]Decreased posture  []Decreased sensation  []Decreased coordination   []Decreased vision  [x]Decreased safety awareness   [x]Increased pain       Comments:  Pt found in bed. Nursing okayed pt for PT evaluation. Pt reported feeling dizzy once sitting EOB which decreased with seated rest.  Cueing required for hand placement during transfers. Pt with unsteady gait and rushing to sit EOB due to pain in LEs. Pt with decreased safety. At end of eval, pt left in bed with call light in reach and bed alarm on.      Pt's/ family goals   1. None stated    Prognosis is good for reaching above PT goals. Patient and or family understand(s) diagnosis, prognosis, and plan of care. yes    PHYSICAL THERAPY PLAN OF CARE:    PT POC is established based on physician order and patient diagnosis     Diagnosis:  AMS (altered mental status) [R41.82]    Current Treatment Recommendations:     [x] Strengthening to improve independence with functional mobility   [] ROM to improve independence with functional mobility   [x] Balance Training to improve static/dynamic balance and to reduce fall risk  [x] Endurance Training to improve activity tolerance during functional mobility   [x] Transfer Training to improve safety and independence with all functional transfers   [x] Gait Training to improve gait mechanics, endurance and assess need for appropriate assistive device  [] Stair Training in preparation for safe discharge home and/or into the community   [] Positioning to prevent skin breakdown and contractures  [x] Safety and Education Training   [x] Patient/Caregiver Education   [] HEP  [] Other     PT long term treatment goals are located in above grid    Frequency of treatments: 2-5x/week x 1-2 weeks. Time in  0837  Time out  0851    Evaluation Time includes thorough review of current medical information, gathering information on past medical history/social history and prior level of function, completion of standardized testing/informal observation of tasks, assessment of data and education on plan of care and goals.     CPT codes:  [x] Low Complexity PT evaluation 63824  [] Moderate Complexity PT evaluation 62052  [] High Complexity PT evaluation [id number]  [] PT Re-evaluation [id number]  [] Therapeutic activities 24757 __minutes  [] Therapeutic exercises 27439 __ minutes      Saadia Garza, PT, DPT  PT [id number]

## 2022-06-18 NOTE — PROGRESS NOTES
[address]  Progress Note    Chief complaint :  Chief Complaint   Patient presents with    Shortness of Breath     for a few days per pt    Altered Mental Status     per NH staff, pt able to answer all orientation questions       Subjective:    No overnight problems. Patient resting comfortably with 2L NC. Patient states that her coughing has improved. Pt stated her mucus is clearing up. Patient describes feeling better. Patient denies chest pain, nausea, vomiting, fever, chills. Patient is tolerating diet. Past medical, surgical, family and social history were reviewed, non-contributory, and unchanged unless otherwise stated. Objective:  BP (!) 118/41   Pulse 64   Temp 98.2 °F (36.8 °C) (Oral)   Resp 16   Ht 5' 10\" (1.778 m)   Wt 154 lb 4.8 oz (70 kg)   SpO2 100%   BMI 22.14 kg/m²   Vital signs reviewed     Physical Exam  Vitals and nursing note reviewed. Constitutional:       Appearance: Normal appearance. HENT:      Head: Normocephalic and atraumatic. Nose: Nose normal. No congestion or rhinorrhea. Mouth/Throat:      Mouth: Mucous membranes are moist.      Pharynx: Oropharynx is clear. Eyes:      General: No scleral icterus. Conjunctiva/sclera: Conjunctivae normal.   Neck:      Vascular: No carotid bruit. Cardiovascular:      Rate and Rhythm: Normal rate and regular rhythm. Heart sounds: Murmur heard. No gallop. Pulmonary:      Breath sounds: Rales present. No wheezing. Comments: Increased effort, requiring 2L of NC   Abdominal:      General: Bowel sounds are normal. There is no distension. Palpations: Abdomen is soft. Tenderness: There is no abdominal tenderness. There is no guarding. Musculoskeletal:         General: Normal range of motion. Cervical back: Normal range of motion and neck supple. Right lower leg: No edema. Left lower leg: No edema.       Comments: B/l hands non-tender to palpation, middle distal phlanx amputed. Left hand, not warm to touch and regular skin colour. Right lymphedema    Skin:     Coloration: Skin is not jaundiced. Comments: Port on right chest with plastic over and dressing   Neurological:      General: No focal deficit present. Mental Status: She is alert and oriented to person, place, and time. Psychiatric:         Mood and Affect: Mood normal.         Behavior: Behavior normal.         Thought Content:  Thought content normal.         Judgment: Judgment normal.       Labs:  Labs Reviewed    Recent Results (from the past 24 hour(s))   POCT Glucose    Collection Time: 06/17/22 11:29 AM   Result Value Ref Range    Meter Glucose 139 (H) 74 - 99 mg/dL   POCT Glucose    Collection Time: 06/17/22 10:33 PM   Result Value Ref Range    Meter Glucose 104 (H) 74 - 99 mg/dL   Basic Metabolic Panel w/ Reflex to MG    Collection Time: 06/18/22  2:00 AM   Result Value Ref Range    Sodium 137 132 - 146 mmol/L    Potassium reflex Magnesium 3.7 3.5 - 5.0 mmol/L    Chloride 99 98 - 107 mmol/L    CO2 25 22 - 29 mmol/L    Anion Gap 13 7 - 16 mmol/L    Glucose 86 74 - 99 mg/dL    BUN 13 6 - 23 mg/dL    CREATININE 2.8 (H) 0.5 - 1.0 mg/dL    GFR Non-African American 20 >=60 mL/min/1.73    GFR African American 20     Calcium 8.9 8.6 - 10.2 mg/dL   CBC with Auto Differential    Collection Time: 06/18/22  2:00 AM   Result Value Ref Range    WBC 4.7 4.5 - 11.5 E9/L    RBC 3.26 (L) 3.50 - 5.50 E12/L    Hemoglobin 9.5 (L) 11.5 - 15.5 g/dL    Hematocrit 31.2 (L) 34.0 - 48.0 %    MCV 95.7 80.0 - 99.9 fL    MCH 29.1 26.0 - 35.0 pg    MCHC 30.4 (L) 32.0 - 34.5 %    RDW 18.8 (H) 11.5 - 15.0 fL    Platelets 743 957 - 580 E9/L    MPV 10.9 7.0 - 12.0 fL    Neutrophils % 64.2 43.0 - 80.0 %    Immature Granulocytes % 0.4 0.0 - 5.0 %    Lymphocytes % 20.4 20.0 - 42.0 %    Monocytes % 10.4 2.0 - 12.0 %    Eosinophils % 4.0 0.0 - 6.0 %    Basophils % 0.6 0.0 - 2.0 %    Neutrophils Absolute 3.01 1.80 - 7.30 E9/L    Immature Granulocytes # 0.02 E9/L    Lymphocytes Absolute 0.96 (L) 1.50 - 4.00 E9/L    Monocytes Absolute 0.49 0.10 - 0.95 E9/L    Eosinophils Absolute 0.19 0.05 - 0.50 E9/L    Basophils Absolute 0.03 0.00 - 0.20 E9/L   Phosphorus    Collection Time: 06/18/22  2:00 AM   Result Value Ref Range    Phosphorus 2.1 (L) 2.5 - 4.5 mg/dL   Magnesium    Collection Time: 06/18/22  2:00 AM   Result Value Ref Range    Magnesium 2.1 1.6 - 2.6 mg/dL       Radiology and other tests reviewed:  Fluoroscopy modified barium swallow with video   Final Result   1. No barium aspiration or significant swallowing deficits. 2.  Transient laryngeal penetration with thin liquid barium. 3.  Cervical spine degenerative spondylosis. 4.  Please see separate speech pathology report for full discussion of   findings and recommendations. CT HEAD WO CONTRAST   Final Result   No acute intracranial abnormality. CT CHEST WO CONTRAST   Final Result   1. New ground-glass and airspace consolidations throughout right lung notable   in right lower lobe as well as minimal opacities in left lower lobe   suggesting interstitial pulmonary edema or bilateral pneumonia. 2. Cardiomegaly with pulmonary vascular congestion. 3. Multiple stable prominent mediastinal lymph nodes. XR CHEST PORTABLE   Final Result   No acute process.              Assessment:  Active Hospital Problems    Diagnosis Date Noted    Patient is Pentecostal [Z78.9] 11/07/2017     Priority: High     Class: Chronic    Healthcare-associated pneumonia [J18.9] 06/16/2022     Priority: Medium    Hypoxia [R09.02] 06/16/2022     Priority: Medium    Ischemic cardiomyopathy [I25.5]      Priority: Medium    AMS (altered mental status) [R41.82] 06/15/2022     Priority: Medium    Elevated troponin [R77.8] 02/17/2017     Priority: Medium    CAD in native artery [I25.10] 12/09/2021    Mitral valve disease [I05.9] 11/11/2021    Lymphedema of right upper extremity [I89.0]     ESRD (end stage renal disease) on dialysis ([facility] [N18.6, Z99.2]     Diabetic peripheral neuropathy ([facility] [E11.42] 08/30/2018    Controlled type 2 diabetes mellitus with chronic kidney disease on chronic dialysis, with long-term current use of insulin ([facility] [E11.22, N18.6, Z79.4, Z99.2] 02/22/2017    Glaucoma [H40.9]        Plan: Altered mental status most likely due to bilateral lower lobe Health-care associated pneumonia  CT head showed no acute intracranial abnormality, ED chest positive for new groundglass and airspace consolidations throughout the right lung, cardiomegaly with pulmonary vascular congestion, multiple stable prominent mediastinal lymph nodes. Influenza AMB not detected COVID-19 negative  Respiratory panel positive for SARS-Cov-2 -4 weeks ago, no need for treatments or isolation at this time  2 L of oxygen via nasal cannula wean as tolerated  Zosyn 4.5 g Q12H  Nebs every 4 hours   Bcx negative   CBC, BMP daily  Added mucinex today  Improved mentation   ID following appreciate input    Vertebral osteomyelitis  Stable  IV antibiotic after dialysis Monday Wednesday Friday with vancomycin 750 mg completed 6 weeks  doxycyline 100 mg Q12H for suppression of osteomyelitis.    ID following appreciate input    Mild to moderate pharyngeal phase dysphagia  Fluoroscopic modified barium swallow study showed no barium aspiration no significant swallowing deficits, transient laryngeal penetration is within thin liquid barium, cervical spine degenerative spondylitis  Avoid thin liquids with a straw    Exposed PowerPort  Blood cultures negative  ID following appreciate input    End-stage renal disease hemodialysis on Monday Wednesday Friday  Cr 2.8 today, baseline 3   Continue home Fosrenol 1 g 3 times daily  Continue Bumex 2mg only on Tu/Th/Sat   BMP, mag, Phos daily  Dialysis yest, drained 1800 ml  Nephrology following appreciate input    CAD with NSTEMI 1

## 2022-06-18 NOTE — PROGRESS NOTES
[address] [place] [address] [facility]  [facility]  Progress Note    SUBJECTIVE:  Chief Complaint   Patient presents with    Shortness of Breath     for a few days per pt    Altered Mental Status     per NH staff, pt able to answer all orientation questions     Patient is tolerating medications. No reported adverse drug reactions. No nausea, vomiting, diarrhea. Review of systems:  As stated above in the chief complaint, otherwise negative. Medications:  Scheduled Meds:   ipratropium-albuterol  1 ampule Inhalation 4x daily    doxycycline hyclate  100 mg Oral 2 times per day    bumetanide  2 mg Oral Once per day on  Sat    isosorbide mononitrate  30 mg Oral Daily    latanoprost  1 drop Right Eye Nightly    metoprolol succinate  25 mg Oral Daily    pantoprazole  20 mg Oral QAM AC    ticagrelor  90 mg Oral BID    sodium chloride flush  5-40 mL IntraVENous 2 times per day    [Held by provider] lanthanum  1,000 mg Oral TID WC    heparin (porcine)  5,000 Units SubCUTAneous TID    piperacillin-tazobactam  4,500 mg IntraVENous Q12H    brimonidine  1 drop Right Eye BID    timolol  1 drop Right Eye BID    aspirin  81 mg Oral Daily     Continuous Infusions:   sodium chloride 25 mL (22)    dextrose       PRN Meds:guaiFENesin, midodrine, sodium chloride flush, sodium chloride, ondansetron **OR** ondansetron, polyethylene glycol, acetaminophen **OR** acetaminophen, glucose, dextrose bolus **OR** dextrose bolus, glucagon (rDNA), dextrose    OBJECTIVE:  BP (!) 101/30   Pulse 68   Temp 98.1 °F (36.7 °C) (Oral)   Resp 18   Ht 5' 10\" (1.778 m)   Wt 154 lb 4.8 oz (70 kg)   SpO2 96%   BMI 22.14 kg/m²   Temp  Av.1 °F (36.7 °C)  Min: 97.9 °F (36.6 °C)  Max: 98.4 °F (36.9 °C)  Constitutional: The patient is awake, alert, and oriented. Laying in bed, sleepy  Skin: Warm and dry. No rashes were noted. HEENT: Round and reactive pupils. Moist mucous membranes.   No ulcerations or thrush. Neck: Supple to movements. Chest: No use of accessory muscles to breathe. Symmetrical expansion. No wheezing, crackles. R mastectomy, + rhonchi   Cardiovascular: S1 and S2 are rhythmic and regular. No murmurs appreciated. Abdomen: Positive bowel sounds to auscultation. Benign to palpation. No masses felt. No hepatosplenomegaly.   Extremities: No clubbing, no cyanosis, no edema except for lymphedema  postmastectomy on the right  Lines: peripheral   R chest Mediport      Laboratory and Tests Review:  Lab Results   Component Value Date    WBC 4.7 06/18/2022    WBC 6.6 06/17/2022    WBC 8.4 06/16/2022    HGB 9.5 (L) 06/18/2022    HCT 31.2 (L) 06/18/2022    MCV 95.7 06/18/2022     06/18/2022     Lab Results   Component Value Date    NEUTROABS 3.01 06/18/2022    NEUTROABS 4.81 06/17/2022    NEUTROABS 6.18 06/16/2022     Lab Results   Component Value Date    CRPHS 0.7 08/16/2016    CRPHS 2.6 04/05/2016    CRPHS 7.6 (H) 01/28/2015     Lab Results   Component Value Date    ALT 16 06/15/2022    AST 19 06/15/2022    ALKPHOS 155 (H) 06/15/2022    BILITOT 0.7 06/15/2022     Lab Results   Component Value Date     06/18/2022    K 3.7 06/18/2022    CL 99 06/18/2022    CO2 25 06/18/2022    BUN 13 06/18/2022    CREATININE 2.8 06/18/2022    CREATININE 4.0 06/17/2022    CREATININE 3.0 06/16/2022    GFRAA 20 06/18/2022    LABGLOM 20 06/18/2022    GLUCOSE 86 06/18/2022    GLUCOSE 46 04/02/2012    PROT 6.7 06/15/2022    LABALBU 3.5 06/15/2022    LABALBU 3.8 04/02/2012    CALCIUM 8.9 06/18/2022    BILITOT 0.7 06/15/2022    ALKPHOS 155 06/15/2022    AST 19 06/15/2022    ALT 16 06/15/2022     Lab Results   Component Value Date    CRP 7.7 (H) 04/13/2022    CRP 0.7 (H) 01/20/2022    CRP 1.6 (H) 12/20/2021     Lab Results   Component Value Date    SEDRATE 30 (H) 06/15/2022    SEDRATE 81 (H) 04/13/2022    SEDRATE 19 01/20/2022     Radiology:  Reviewed    Microbiology:   Respiratory Culture 6/16/22: few PMNL, moderate GPC in pairs  Blood Cultures 6/15/22: negative so far  COVID-19 PCR positive on respiratory array    ASSESSMENT:  · Aspiration pneumonia pneumonia/HCAP  · Vertebral osteomyelitis and infected hardware lumbar spine-suppressive doxycycline ongoing; just finished 6 weeks of Vanco with hemodialysis  · Respiratory viral panel positive for COVID by PCR; rapid test was negative but clinically CAT scan reflects bacterial pneumonia at this point I believe the COVID is asymptomatic. PLAN:  · Continue Zosyn and Doxycycline   · Will need to DC on Doxy for suppression  · Speech following: video swallow completed-laryngeal penetration with thin liquids  · Follow any evolution of COVID-19  · Check final cultures  · Monitor labs    GOYO Cary  12:52 PM  6/18/2022     Patient seen and examined. I had a face to face encounter with the patient. Agree with exam.  Assessment and plan as outlined above and directed by me. Addition and corrections were done as deemed appropriate. My exam and plan include: The patient says that she is feeling significantly better. She still has a greenish productive sputum. Tolerating current antibiotics.     Jess Sharma MD  6/18/2022  3:50 PM

## 2022-06-19 LAB
ANION GAP SERPL CALCULATED.3IONS-SCNC: 12 MMOL/L (ref 7–16)
BASOPHILS ABSOLUTE: 0.04 E9/L (ref 0–0.2)
BASOPHILS RELATIVE PERCENT: 0.9 % (ref 0–2)
BUN BLDV-MCNC: 20 MG/DL (ref 6–23)
CALCIUM SERPL-MCNC: 9.3 MG/DL (ref 8.6–10.2)
CHLORIDE BLD-SCNC: 101 MMOL/L (ref 98–107)
CO2: 27 MMOL/L (ref 22–29)
CREAT SERPL-MCNC: 4.1 MG/DL (ref 0.5–1)
EOSINOPHILS ABSOLUTE: 0.31 E9/L (ref 0.05–0.5)
EOSINOPHILS RELATIVE PERCENT: 7.1 % (ref 0–6)
GFR AFRICAN AMERICAN: 13
GFR NON-AFRICAN AMERICAN: 13 ML/MIN/1.73
GLUCOSE BLD-MCNC: 102 MG/DL (ref 74–99)
HCT VFR BLD CALC: 32.6 % (ref 34–48)
HEMOGLOBIN: 10 G/DL (ref 11.5–15.5)
IMMATURE GRANULOCYTES #: 0.04 E9/L
IMMATURE GRANULOCYTES %: 0.9 % (ref 0–5)
LYMPHOCYTES ABSOLUTE: 1.09 E9/L (ref 1.5–4)
LYMPHOCYTES RELATIVE PERCENT: 24.8 % (ref 20–42)
MAGNESIUM: 2.3 MG/DL (ref 1.6–2.6)
MCH RBC QN AUTO: 28.4 PG (ref 26–35)
MCHC RBC AUTO-ENTMCNC: 30.7 % (ref 32–34.5)
MCV RBC AUTO: 92.6 FL (ref 80–99.9)
METER GLUCOSE: 100 MG/DL (ref 74–99)
METER GLUCOSE: 123 MG/DL (ref 74–99)
METER GLUCOSE: 132 MG/DL (ref 74–99)
METER GLUCOSE: 135 MG/DL (ref 74–99)
MONOCYTES ABSOLUTE: 0.56 E9/L (ref 0.1–0.95)
MONOCYTES RELATIVE PERCENT: 12.8 % (ref 2–12)
NEUTROPHILS ABSOLUTE: 2.35 E9/L (ref 1.8–7.3)
NEUTROPHILS RELATIVE PERCENT: 53.5 % (ref 43–80)
PDW BLD-RTO: 18.4 FL (ref 11.5–15)
PHOSPHORUS: 2.7 MG/DL (ref 2.5–4.5)
PLATELET # BLD: 200 E9/L (ref 130–450)
PMV BLD AUTO: 10.5 FL (ref 7–12)
POTASSIUM REFLEX MAGNESIUM: 4 MMOL/L (ref 3.5–5)
RBC # BLD: 3.52 E12/L (ref 3.5–5.5)
SODIUM BLD-SCNC: 140 MMOL/L (ref 132–146)
WBC # BLD: 4.4 E9/L (ref 4.5–11.5)

## 2022-06-19 PROCEDURE — 85025 COMPLETE CBC W/AUTO DIFF WBC: CPT

## 2022-06-19 PROCEDURE — 6370000000 HC RX 637 (ALT 250 FOR IP): Performed by: FAMILY MEDICINE

## 2022-06-19 PROCEDURE — 2060000000 HC ICU INTERMEDIATE R&B

## 2022-06-19 PROCEDURE — 94640 AIRWAY INHALATION TREATMENT: CPT

## 2022-06-19 PROCEDURE — 6360000002 HC RX W HCPCS: Performed by: FAMILY MEDICINE

## 2022-06-19 PROCEDURE — 99232 SBSQ HOSP IP/OBS MODERATE 35: CPT | Performed by: FAMILY MEDICINE

## 2022-06-19 PROCEDURE — 2580000003 HC RX 258: Performed by: FAMILY MEDICINE

## 2022-06-19 PROCEDURE — 94668 MNPJ CHEST WALL SBSQ: CPT

## 2022-06-19 PROCEDURE — 83735 ASSAY OF MAGNESIUM: CPT

## 2022-06-19 PROCEDURE — 82962 GLUCOSE BLOOD TEST: CPT

## 2022-06-19 PROCEDURE — 80048 BASIC METABOLIC PNL TOTAL CA: CPT

## 2022-06-19 PROCEDURE — 84100 ASSAY OF PHOSPHORUS: CPT

## 2022-06-19 PROCEDURE — 6370000000 HC RX 637 (ALT 250 FOR IP): Performed by: SPECIALIST

## 2022-06-19 PROCEDURE — 36415 COLL VENOUS BLD VENIPUNCTURE: CPT

## 2022-06-19 RX ADMIN — ISOSORBIDE MONONITRATE 30 MG: 30 TABLET, EXTENDED RELEASE ORAL at 08:50

## 2022-06-19 RX ADMIN — MIDODRINE HYDROCHLORIDE 5 MG: 5 TABLET ORAL at 21:42

## 2022-06-19 RX ADMIN — SODIUM CHLORIDE, PRESERVATIVE FREE 10 ML: 5 INJECTION INTRAVENOUS at 21:42

## 2022-06-19 RX ADMIN — IPRATROPIUM BROMIDE AND ALBUTEROL SULFATE 1 AMPULE: .5; 2.5 SOLUTION RESPIRATORY (INHALATION) at 12:41

## 2022-06-19 RX ADMIN — ASPIRIN 81 MG CHEWABLE TABLET 81 MG: 81 TABLET CHEWABLE at 08:49

## 2022-06-19 RX ADMIN — SODIUM CHLORIDE, PRESERVATIVE FREE 10 ML: 5 INJECTION INTRAVENOUS at 08:50

## 2022-06-19 RX ADMIN — PIPERACILLIN AND TAZOBACTAM 4500 MG: 4; .5 INJECTION, POWDER, LYOPHILIZED, FOR SOLUTION INTRAVENOUS at 21:49

## 2022-06-19 RX ADMIN — IPRATROPIUM BROMIDE AND ALBUTEROL SULFATE 1 AMPULE: .5; 2.5 SOLUTION RESPIRATORY (INHALATION) at 09:22

## 2022-06-19 RX ADMIN — HEPARIN SODIUM 5000 UNITS: 10000 INJECTION INTRAVENOUS; SUBCUTANEOUS at 21:44

## 2022-06-19 RX ADMIN — SODIUM CHLORIDE 25 ML: 9 INJECTION, SOLUTION INTRAVENOUS at 21:48

## 2022-06-19 RX ADMIN — PIPERACILLIN AND TAZOBACTAM 4500 MG: 4; .5 INJECTION, POWDER, LYOPHILIZED, FOR SOLUTION INTRAVENOUS at 08:58

## 2022-06-19 RX ADMIN — TIMOLOL MALEATE 1 DROP: 5 SOLUTION OPHTHALMIC at 08:51

## 2022-06-19 RX ADMIN — ACETAMINOPHEN 650 MG: 325 TABLET ORAL at 04:04

## 2022-06-19 RX ADMIN — HEPARIN SODIUM 5000 UNITS: 10000 INJECTION INTRAVENOUS; SUBCUTANEOUS at 09:41

## 2022-06-19 RX ADMIN — TIMOLOL MALEATE 1 DROP: 5 SOLUTION OPHTHALMIC at 21:43

## 2022-06-19 RX ADMIN — IPRATROPIUM BROMIDE AND ALBUTEROL SULFATE 1 AMPULE: .5; 2.5 SOLUTION RESPIRATORY (INHALATION) at 16:28

## 2022-06-19 RX ADMIN — ACETAMINOPHEN 650 MG: 325 TABLET ORAL at 21:42

## 2022-06-19 RX ADMIN — DOXYCYCLINE HYCLATE 100 MG: 100 CAPSULE ORAL at 21:42

## 2022-06-19 RX ADMIN — METOPROLOL SUCCINATE 25 MG: 25 TABLET, FILM COATED, EXTENDED RELEASE ORAL at 08:50

## 2022-06-19 RX ADMIN — TICAGRELOR 90 MG: 90 TABLET ORAL at 21:42

## 2022-06-19 RX ADMIN — DOXYCYCLINE HYCLATE 100 MG: 100 CAPSULE ORAL at 08:54

## 2022-06-19 RX ADMIN — TICAGRELOR 90 MG: 90 TABLET ORAL at 08:49

## 2022-06-19 RX ADMIN — PANTOPRAZOLE SODIUM 20 MG: 20 TABLET, DELAYED RELEASE ORAL at 06:05

## 2022-06-19 RX ADMIN — IPRATROPIUM BROMIDE AND ALBUTEROL SULFATE 1 AMPULE: .5; 2.5 SOLUTION RESPIRATORY (INHALATION) at 20:29

## 2022-06-19 RX ADMIN — BRIMONIDINE TARTRATE 1 DROP: 2 SOLUTION/ DROPS OPHTHALMIC at 08:50

## 2022-06-19 RX ADMIN — LATANOPROST 1 DROP: 50 SOLUTION OPHTHALMIC at 21:43

## 2022-06-19 RX ADMIN — BRIMONIDINE TARTRATE 1 DROP: 2 SOLUTION/ DROPS OPHTHALMIC at 21:43

## 2022-06-19 ASSESSMENT — PAIN SCALES - GENERAL
PAINLEVEL_OUTOF10: 7
PAINLEVEL_OUTOF10: 0

## 2022-06-19 NOTE — PROGRESS NOTES
[facility]  Nephrology Progress Note    Patient's Name: Yudi Guadalupe      History of Present Illness-full consult deferred as pt followed longitudinally at dialysis:    \"Claudia Castorena is a 78 y.o. female with a past medical history of breast cancer, coronary artery disease, hypertension, hyperlipidemia, and anemia. She presented to the [address] [facility] ED on 5/2 with complaints of back and abdominal pain patient was subsequently transferred to [facility] for NSTEMI. Patient is known to our service and dialyzes at [address] MWF 1st shift left AV fistula. In April she was Dx with DOMINICK zimmerman and is treated with iv Vanc  And has a mediport in the R  Pt is now admitted from the ED for cough and hypoxia. She came to the ED after having ongoing pain in the RLE when sitting, no significant pain when standing or using her walker. She notes since 6 days ago she had a cough productive of grey sputum. Resp Viral Panel (+) for SARS-CoV-2    6/19/22- awake and alert. No overnight issues, no new needs. Doing OK today. PMH:    Past Medical History:   Diagnosis Date    Acute infection of bone ([address]     infection of rt foot, resolved.     Acute osteomyelitis of phalanx of left hand ([address] 1/27/2022    Left third distal phalanx    Anemia of chronic disease     Arthritis     Breast cancer ([address]     right breast, 2008/ bladder, 2006- last chemotherapy \"years ago\"    CAD (coronary artery disease)     Carpal tunnel syndrome     bilat - for OR left hand 3-17-20     Chronic diastolic CHF (congestive heart failure) ([address] 09/23/2014 9/23/14- echocardiogram revealed moderate LV concentric hypertrophy, stage III diastolic dysfunction, mild tricuspid regurgitation    CKD (chronic kidney disease) stage 4, GFR 15-29 ml/min ([facility])     Diabetic retinopathy ([address]     Glaucoma     Hemodialysis patient ([address]     [facility]  mon wed fri- Dr. Torres Sat - left arm fistula     Hyperkalemia, diminished renal excretion 11/9/2017    Hyperlipidemia     Hypertension     Hypoglycemia unawareness in type 1 diabetes mellitus ([address] 11/7/2017    Insulin dependent type 2 diabetes mellitus (HCC)     Neuropathy     feet    Osteomyelitis due to secondary diabetes ([address]     rt great toe with amputation    Patient is Moravian 11/7/2017    Refusal of blood product     patient states she dose not take blood transfusion    Ventricular hypertrophy     Ventricular tachycardia ([address] 5/24/2021    Vitreous hemorrhage (HCC)     left eye     Patient Active Problem List   Diagnosis    Diabetic retinopathy ([address]    Malignant neoplasm of right female breast ([address]    Atherosclerosis of native coronary artery of native heart without angina pectoris    Moderate obesity    Left ventricular hypertrophy    Herniated lumbar intervertebral disc    Lumbar degenerative disc disease    Pseudomeningocele    Lumbar radiculopathy    Lymphedema of arm    Anemia of chronic disease    Chronic diastolic CHF (congestive heart failure) ([address]    Hypertension    Glaucoma    Refusal of blood product    Elevated troponin    Controlled type 2 diabetes mellitus with chronic kidney disease on chronic dialysis, with long-term current use of insulin (HCC)    Vitreous hemorrhage ([address]    Patient is Moravian    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 ([address]    Chronic pain syndrome    Lumbar post-laminectomy syndrome    Cervicalgia    Diabetic peripheral neuropathy ([address]    ESRD (end stage renal disease) ([address]    Bilateral carpal tunnel syndrome    Cardiac arrest ([address]    ESRD (end stage renal disease) on dialysis ([address]    Mixed hyperlipidemia    Lymphedema of right upper extremity    Coronary artery disease involving native coronary artery of native heart with angina pectoris ([address]    Mitral valve disease    CAD in native artery    Lumbar stenosis without neurogenic claudication    Intractable low back pain    Unable to ambulate    NSTEMI (non-ST elevated myocardial infarction) (HCC)    Moderate protein-calorie malnutrition (HCC)    AMS (altered mental status)    Ischemic cardiomyopathy    Healthcare-associated pneumonia    Hypoxia     Meds:     ipratropium-albuterol  1 ampule Inhalation 4x daily    doxycycline hyclate  100 mg Oral 2 times per day    bumetanide  2 mg Oral Once per day on Tue Thu Sat    isosorbide mononitrate  30 mg Oral Daily    latanoprost  1 drop Right Eye Nightly    metoprolol succinate  25 mg Oral Daily    pantoprazole  20 mg Oral QAM AC    ticagrelor  90 mg Oral BID    sodium chloride flush  5-40 mL IntraVENous 2 times per day    [Held by provider] lanthanum  1,000 mg Oral TID WC    heparin (porcine)  5,000 Units SubCUTAneous TID    piperacillin-tazobactam  4,500 mg IntraVENous Q12H    brimonidine  1 drop Right Eye BID    timolol  1 drop Right Eye BID    aspirin  81 mg Oral Daily      sodium chloride 25 mL (06/18/22 2052)    dextrose       Meds prn:     guaiFENesin, midodrine, sodium chloride flush, sodium chloride, ondansetron **OR** ondansetron, polyethylene glycol, acetaminophen **OR** acetaminophen, glucose, dextrose bolus **OR** dextrose bolus, glucagon (rDNA), dextrose    Meds prior to admission:     No current facility-administered medications on file prior to encounter. Current Outpatient Medications on File Prior to Encounter   Medication Sig Dispense Refill    gabapentin (NEURONTIN) 100 MG capsule Take 2 capsules by mouth 3 times daily for 180 days.  180 capsule 5    atorvastatin (LIPITOR) 80 MG tablet Take 80 mg by mouth nightly      bumetanide (BUMEX) 2 MG tablet Take 2 mg by mouth three times a week Given Sunday,Tuesday,Thursday      isosorbide mononitrate (IMDUR) 30 MG extended release tablet Take 30 mg by mouth daily      insulin glargine (LANTUS) 100 UNIT/ML injection vial Inject 5 Units into the skin nightly       metoprolol succinate (TOPROL XL) 25 MG extended release tablet Take 1 tablet by mouth daily 90 tablet 1    lanthanum (FOSRENOL) 1000 MG chewable tablet Take 1,000 mg by mouth 3 times daily (with meals)       allopurinol (ZYLOPRIM) 100 MG tablet Take 1 tablet by mouth daily 90 tablet 1    ticagrelor (BRILINTA) 90 MG TABS tablet Take 1 tablet by mouth 2 times daily 180 tablet 1    B Complex-C-Folic Acid (BONI CAPS) 1 MG CAPS Take 1 mg by mouth nightly       omeprazole (PRILOSEC) 20 MG delayed release capsule Take 20 mg by mouth daily as needed       LUMIGAN 0.01 % SOLN ophthalmic drops Place 1 drop into the right eye nightly       COMBIGAN 0.2-0.5 % ophthalmic solution Place 1 drop into the right eye every 12 hours   7    aspirin 81 MG EC tablet Take 1 tablet by mouth daily 30 tablet 0    midodrine (PROAMATINE) 5 MG tablet Take 5 mg by mouth daily as needed      lidocaine-prilocaine (EMLA) 2.5-2.5 % cream Apply topically as needed for Pain        Allergies:    Furosemide    Social History:     reports that she has never smoked. She has never used smokeless tobacco. She reports that she does not drink alcohol and does not use drugs.     Family History:         Problem Relation Age of Onset    Breast Cancer Mother 61    Hypertension Mother     Heart Disease Father     Prostate Cancer Father     Breast Cancer Maternal Grandmother 61     Physical Exam:    Patient Vitals for the past 24 hrs:   BP Temp Temp src Pulse Resp SpO2   06/19/22 0637 126/62 97.9 °F (36.6 °C) Oral 66 16 92 %   06/19/22 0400 (!) 141/43 98.6 °F (37 °C) Oral 67 18 92 %   06/18/22 2030 (!) 156/60 98.7 °F (37.1 °C) Oral 62 18 92 %   06/18/22 1545 (!) 126/59 97.7 °F (36.5 °C) Oral 68 18 100 %   06/18/22 1231 -- -- -- 68 18 96 %   06/18/22 0930 (!) 101/30 -- -- -- -- --   06/18/22 0908 -- -- -- 68 18 99 %     No intake or output data in the 24 hours ending 06/19/22 0837  General: Awake, alert, no acute distress  Neck: No JVD noted  Lungs: Yellow 11/07/2017    NITRU Negative 11/07/2017    GLUCOSEU Negative 11/07/2017    GLUCOSEU NEGATIVE 08/27/2011    KETUA Negative 11/07/2017    UROBILINOGEN 0.2 11/07/2017    BILIRUBINUR Negative 11/07/2017    BILIRUBINUR NEGATIVE 08/27/2011     No results found for: ALVINO, CREURRAN, MACREATRATIO, OSMOU    No components found for: URIC    No results found for: LIPIDPAN    Assessment and Plans:    1. ESKD on HD  OP [facility] MWF 1st shift   left AV fistula  PLAN:  1. Continue HD MWF  2. Next treatment 6/20    2. Recent  NSTEMI  S/p cardiac catheterization with balloon angioplasty 5/5  Cardiology previously following    3. Back pain  S/p bone biopsy 4/18 for lumbar spine osteomyelitis  Had been on vancomycin for this  Now on zosyn and vibramycin   ID following    4. Hypertension  BP goal<140/90  BP at goal  Monitor on current regimen and with HD    5. Anemia  Hemoglobin target 10-12  Hemoglobin 9.5 today  Continue JAYSHREE as hemoglobin below target  Transfuse for hemoglobin<7  Monitor H&H    6.  Secondary hyperparathyroidism of renal origin  PTH noted in the outpatient setting on 4/25/2022 was 443  Phosphorus 2.1-->2.7  Continue to [employer] and recheck PO4 in the am  Monitor labs    GOYO Christensen CNP       ==================================    Renal Attending Addendum     Seen and examined   Agree with nurse practitioner note above     Will follow daily for dialysis needs  Tentative plan is for treatment on Monday  She has been tolerating dialysis well  Hemoglobin near goal.  Will need to confirm JAYSHREE dosing for outpatient clinic  She has hypophosphatemia.   Hold binders and follow phosphorus  Discussed with family at bedside    Otherwise, per nurse practitioner note above     Alek Day MD

## 2022-06-19 NOTE — PROGRESS NOTES
General: Tenderness (Rt lower back where the pain pump located) present. Normal range of motion. Cervical back: Normal range of motion and neck supple. Right lower leg: No edema. Left lower leg: No edema. Comments: B/l hands non-tender to palpation, middle distal phlanx amputed. Left hand, not warm to touch and regular skin colour. Right lymphedema    Skin:     Coloration: Skin is not jaundiced. Comments: Port on right chest with plastic over and dressing   Neurological:      General: No focal deficit present. Mental Status: She is alert and oriented to person, place, and time. Psychiatric:         Mood and Affect: Mood normal.         Behavior: Behavior normal.         Thought Content:  Thought content normal.         Judgment: Judgment normal.       Labs:  Labs Reviewed    Recent Results (from the past 24 hour(s))   POCT Glucose    Collection Time: 06/18/22 11:55 AM   Result Value Ref Range    Meter Glucose 113 (H) 74 - 99 mg/dL   POCT Glucose    Collection Time: 06/18/22  5:06 PM   Result Value Ref Range    Meter Glucose 112 (H) 74 - 99 mg/dL   POCT Glucose    Collection Time: 06/18/22  8:42 PM   Result Value Ref Range    Meter Glucose 132 (H) 74 - 99 mg/dL   Basic Metabolic Panel w/ Reflex to MG    Collection Time: 06/19/22  4:20 AM   Result Value Ref Range    Sodium 140 132 - 146 mmol/L    Potassium reflex Magnesium 4.0 3.5 - 5.0 mmol/L    Chloride 101 98 - 107 mmol/L    CO2 27 22 - 29 mmol/L    Anion Gap 12 7 - 16 mmol/L    Glucose 102 (H) 74 - 99 mg/dL    BUN 20 6 - 23 mg/dL    CREATININE 4.1 (H) 0.5 - 1.0 mg/dL    GFR Non-African American 13 >=60 mL/min/1.73    GFR African American 13     Calcium 9.3 8.6 - 10.2 mg/dL   CBC with Auto Differential    Collection Time: 06/19/22  4:20 AM   Result Value Ref Range    WBC 4.4 (L) 4.5 - 11.5 E9/L    RBC 3.52 3.50 - 5.50 E12/L    Hemoglobin 10.0 (L) 11.5 - 15.5 g/dL    Hematocrit 32.6 (L) 34.0 - 48.0 %    MCV 92.6 80.0 - 99.9 fL    MCH 28.4 26.0 - 35.0 pg    MCHC 30.7 (L) 32.0 - 34.5 %    RDW 18.4 (H) 11.5 - 15.0 fL    Platelets 381 164 - 414 E9/L    MPV 10.5 7.0 - 12.0 fL    Neutrophils % 53.5 43.0 - 80.0 %    Immature Granulocytes % 0.9 0.0 - 5.0 %    Lymphocytes % 24.8 20.0 - 42.0 %    Monocytes % 12.8 (H) 2.0 - 12.0 %    Eosinophils % 7.1 (H) 0.0 - 6.0 %    Basophils % 0.9 0.0 - 2.0 %    Neutrophils Absolute 2.35 1.80 - 7.30 E9/L    Immature Granulocytes # 0.04 E9/L    Lymphocytes Absolute 1.09 (L) 1.50 - 4.00 E9/L    Monocytes Absolute 0.56 0.10 - 0.95 E9/L    Eosinophils Absolute 0.31 0.05 - 0.50 E9/L    Basophils Absolute 0.04 0.00 - 0.20 E9/L   Phosphorus    Collection Time: 06/19/22  4:20 AM   Result Value Ref Range    Phosphorus 2.7 2.5 - 4.5 mg/dL   Magnesium    Collection Time: 06/19/22  4:20 AM   Result Value Ref Range    Magnesium 2.3 1.6 - 2.6 mg/dL   POCT Glucose    Collection Time: 06/19/22  6:09 AM   Result Value Ref Range    Meter Glucose 100 (H) 74 - 99 mg/dL       Radiology and other tests reviewed:  Fluoroscopy modified barium swallow with video   Final Result   1. No barium aspiration or significant swallowing deficits. 2.  Transient laryngeal penetration with thin liquid barium. 3.  Cervical spine degenerative spondylosis. 4.  Please see separate speech pathology report for full discussion of   findings and recommendations. CT HEAD WO CONTRAST   Final Result   No acute intracranial abnormality. CT CHEST WO CONTRAST   Final Result   1. New ground-glass and airspace consolidations throughout right lung notable   in right lower lobe as well as minimal opacities in left lower lobe   suggesting interstitial pulmonary edema or bilateral pneumonia. 2. Cardiomegaly with pulmonary vascular congestion. 3. Multiple stable prominent mediastinal lymph nodes. XR CHEST PORTABLE   Final Result   No acute process.              Assessment:  Active Hospital Problems    Diagnosis Date Noted  Patient is V[employer] [Z78.9] 11/07/2017     Priority: High     Class: Chronic    Healthcare-associated pneumonia [J18.9] 06/16/2022     Priority: Medium    Hypoxia [R09.02] 06/16/2022     Priority: Medium    Ischemic cardiomyopathy [I25.5]      Priority: Medium    AMS (altered mental status) [R41.82] 06/15/2022     Priority: Medium    Elevated troponin [R77.8] 02/17/2017     Priority: Medium    CAD in native artery [I25.10] 12/09/2021    Mitral valve disease [I05.9] 11/11/2021    Lymphedema of right upper extremity [I89.0]     ESRD (end stage renal disease) on dialysis ([facility] [address] [N18.6, Z99.2]     Diabetic peripheral neuropathy ([facility] [address] [E11.42] 08/30/2018    Controlled type 2 diabetes mellitus with chronic kidney disease on chronic dialysis, with long-term current use of insulin ([facility] [address] [E11.22, N18.6, Z79.4, Z99.2] 02/22/2017    Glaucoma [H40.9]        Plan: Altered mental status most likely due to bilateral lower lobe Health-care associated pneumonia  CT head showed no acute intracranial abnormality, ED chest positive for new groundglass and airspace consolidations throughout the right lung, cardiomegaly with pulmonary vascular congestion, multiple stable prominent mediastinal lymph nodes. Influenza AMB not detected COVID-19 negative  Respiratory panel positive for SARS-Cov-2 -4 weeks ago, no need for treatments or isolation at this time  Patient currently requiring 2 L of oxygen via nasal cannula wean as tolerated  Zosyn 4.5 g Q12H,   Duonebs every 4 hours PRN  Mucinex   Chest therapy, incentive spirometer, flutter valve  Urine and blood cultures pending  CBC, BMP daily  ID following appreciate input    Vertebral osteomyelitis  Stable  IV antibiotic after dialysis Monday Wednesday Friday    vancomycin 750 mg completed 6 weeks  doxycyline 100 mg Q12H for suppression of osteomyelitis.    ID following appreciate input    Mild to moderate pharyngeal phase dysphagia  Fluoroscopic modified barium swallow study showed no barium aspiration no significant swallowing deficits, transient laryngeal penetration is within thin liquid barium, cervical spine degenerative spondylitis  Avoid thin liquids with a straw    Exposed PowerPort  Blood cultures negative  ID following appreciate input    End-stage renal disease hemodialysis on Monday Wednesday Friday  Creatinine baseline 3, creatinine 4.1 today  Continue home Fosrenol 1 g 3 times daily  Continue Bumex 2mg  BMP, mag, Phos daily  Nephrology following appreciate input    CAD with NSTEMI 1 month ago  EKG showed normal sinus rhythm, possible left atrial enlargement  Continue home metoprolol 25 mg daily, Imdur 30 mg daily, brilinta 90mg BID   Troponin 185, 231   Repeat EKG Normal sinus rhythm, low voltage QRS.      CHF  Last echo with EF of 40 to 45%, dilated LA.  proBNP 70,000  Monitor for fluid overload  Daily weights  Strict ins and outs  Continue home metoprolol 25 mg daily, Imdur 30 mg daily    Anemia of chronic disease   No blood to be given to patient  Daily CBC   hemoglobin today 10.0    Insulin-dependent diabetes type 2  Last A1c 5.1  POCT glucose checks  Per glycemia protocol    History of hypertension  Currently soft blood pressures  Midodrine 5 mg daily as needed  Monitor blood pressures    Glaucoma   Continue home Combigan drops, Xalatan drops    Other UDS screen negative    DVT ppx: Heparin 5000 units 3 times daily  GI ppx: none  Code Status: Full  Diet: Carb controlled -no straw use    Disposition: Discharge to pending clinical course  Pawan Leal MD  Family Medicine [id number]

## 2022-06-19 NOTE — PROGRESS NOTES
[address] [place] [address] [facility]  [facility]  Progress Note    SUBJECTIVE:  Chief Complaint   Patient presents with    Shortness of Breath     for a few days per pt    Altered Mental Status     per NH staff, pt able to answer all orientation questions     Patient is tolerating medications. No reported adverse drug reactions. No nausea, vomiting, diarrhea. Still with some back arthralgias. No other significant complaints. Bringing up a little bit of greenish sputum occasionally. Off oxygen at this point. Review of systems:  As stated above in the chief complaint, otherwise negative.     Medications:  Scheduled Meds:   ipratropium-albuterol  1 ampule Inhalation 4x daily    doxycycline hyclate  100 mg Oral 2 times per day    bumetanide  2 mg Oral Once per day on Tue Thu Sat    isosorbide mononitrate  30 mg Oral Daily    latanoprost  1 drop Right Eye Nightly    metoprolol succinate  25 mg Oral Daily    pantoprazole  20 mg Oral QAM AC    ticagrelor  90 mg Oral BID    sodium chloride flush  5-40 mL IntraVENous 2 times per day    [Held by provider] lanthanum  1,000 mg Oral TID WC    heparin (porcine)  5,000 Units SubCUTAneous TID    piperacillin-tazobactam  4,500 mg IntraVENous Q12H    brimonidine  1 drop Right Eye BID    timolol  1 drop Right Eye BID    aspirin  81 mg Oral Daily     Continuous Infusions:   sodium chloride 25 mL (22)    dextrose       PRN Meds:guaiFENesin, midodrine, sodium chloride flush, sodium chloride, ondansetron **OR** ondansetron, polyethylene glycol, acetaminophen **OR** acetaminophen, glucose, dextrose bolus **OR** dextrose bolus, glucagon (rDNA), dextrose    OBJECTIVE:  /62   Pulse 80   Temp 97.9 °F (36.6 °C) (Oral)   Resp 16   Ht 5' 10\" (1.778 m)   Wt 154 lb 4.8 oz (70 kg)   SpO2 93%   BMI 22.14 kg/m²   Temp  Av.2 °F (36.8 °C)  Min: 97.7 °F (36.5 °C)  Max: 98.7 °F (37.1 °C)  Constitutional: The patient is awake, alert, and oriented. Laying in bed, sleepy  Skin: Warm and dry. No rashes were noted. HEENT: Round and reactive pupils. Moist mucous membranes. No ulcerations or thrush. Neck: Supple to movements. Chest: No use of accessory muscles to breathe. Symmetrical expansion. No wheezing, crackles. R mastectomy, little bit of scattered rhonchi. Clears with cough. Off oxygen  Cardiovascular: S1 and S2 are rhythmic and regular. No murmurs appreciated. Abdomen: Positive bowel sounds to auscultation. Benign to palpation. No masses felt. No hepatosplenomegaly.   Extremities: No clubbing, no cyanosis, no edema except for lymphedema  postmastectomy on the right  Lines: peripheral   R chest [facility]      Laboratory and Tests Review:  Lab Results   Component Value Date    WBC 4.4 (L) 06/19/2022    WBC 4.7 06/18/2022    WBC 6.6 06/17/2022    HGB 10.0 (L) 06/19/2022    HCT 32.6 (L) 06/19/2022    MCV 92.6 06/19/2022     06/19/2022     Lab Results   Component Value Date    NEUTROABS 2.35 06/19/2022    NEUTROABS 3.01 06/18/2022    NEUTROABS 4.81 06/17/2022     Lab Results   Component Value Date    CRPHS 0.7 08/16/2016    CRPHS 2.6 04/05/2016    CRPHS 7.6 (H) 01/28/2015     Lab Results   Component Value Date    ALT 16 06/15/2022    AST 19 06/15/2022    ALKPHOS 155 (H) 06/15/2022    BILITOT 0.7 06/15/2022     Lab Results   Component Value Date     06/19/2022    K 4.0 06/19/2022     06/19/2022    CO2 27 06/19/2022    BUN 20 06/19/2022    CREATININE 4.1 06/19/2022    CREATININE 2.8 06/18/2022    CREATININE 4.0 06/17/2022    GFRAA 13 06/19/2022    LABGLOM 13 06/19/2022    GLUCOSE 102 06/19/2022    GLUCOSE 46 04/02/2012    PROT 6.7 06/15/2022    LABALBU 3.5 06/15/2022    LABALBU 3.8 04/02/2012    CALCIUM 9.3 06/19/2022    BILITOT 0.7 06/15/2022    ALKPHOS 155 06/15/2022    AST 19 06/15/2022    ALT 16 06/15/2022     Lab Results   Component Value Date    CRP 7.7 (H) 04/13/2022    CRP 0.7 (H) 01/20/2022    CRP 1.6 (H) 12/20/2021 Lab Results   Component Value Date    SEDRATE 30 (H) 06/15/2022    SEDRATE 81 (H) 04/13/2022    SEDRATE 19 01/20/2022     Radiology:  Reviewed    Microbiology:   Respiratory Culture 6/16/22: few PMNL, moderate GPC in pairs  Blood Cultures 6/15/22: negative so far  COVID-19 PCR positive on respiratory array    ASSESSMENT:  · Aspiration pneumonia pneumonia/HCAP  · Vertebral osteomyelitis and infected hardware lumbar spine-suppressive doxycycline ongoing; just finished 6 weeks of Vanco with hemodialysis  · Respiratory viral panel positive for COVID by PCR; rapid test was negative but clinically CAT scan reflects bacterial pneumonia at this point I believe the COVID is asymptomatic. · Mild leukopenia    PLAN:  · Continue Zosyn and Doxycycline   · Will need to DC on Doxy for suppression  · Speech following: video swallow completed-laryngeal penetration with thin liquids  · Follow any evolution of COVID-19  · Check final cultures  · Monitor labs    GOYO Mackenzie  12:05 PM  6/19/2022     Patient seen and examined. I had a face to face encounter with the patient. Agree with exam.  Assessment and plan as outlined above and directed by me. Addition and corrections were done as deemed appropriate. My exam and plan include: The patient is doing fair. She still has a productive cough. Tolerating IV and oral antibiotics. Continue current treatment. Discussed with family medicine.     Modesta Loyd MD  6/19/2022  1:56 PM

## 2022-06-20 ENCOUNTER — APPOINTMENT (OUTPATIENT)
Dept: GENERAL RADIOLOGY | Age: 67
DRG: 177 | End: 2022-06-20
Payer: COMMERCIAL

## 2022-06-20 LAB
ANION GAP SERPL CALCULATED.3IONS-SCNC: 15 MMOL/L (ref 7–16)
BASOPHILS ABSOLUTE: 0.05 E9/L (ref 0–0.2)
BASOPHILS RELATIVE PERCENT: 1 % (ref 0–2)
BUN BLDV-MCNC: 29 MG/DL (ref 6–23)
CALCIUM SERPL-MCNC: 9.2 MG/DL (ref 8.6–10.2)
CHLORIDE BLD-SCNC: 101 MMOL/L (ref 98–107)
CO2: 25 MMOL/L (ref 22–29)
CREAT SERPL-MCNC: 5.2 MG/DL (ref 0.5–1)
EOSINOPHILS ABSOLUTE: 0.31 E9/L (ref 0.05–0.5)
EOSINOPHILS RELATIVE PERCENT: 5.9 % (ref 0–6)
GFR AFRICAN AMERICAN: 10
GFR NON-AFRICAN AMERICAN: 10 ML/MIN/1.73
GLUCOSE BLD-MCNC: 115 MG/DL (ref 74–99)
HCT VFR BLD CALC: 30.4 % (ref 34–48)
HEMOGLOBIN: 9.5 G/DL (ref 11.5–15.5)
IMMATURE GRANULOCYTES #: 0.06 E9/L
IMMATURE GRANULOCYTES %: 1.1 % (ref 0–5)
LYMPHOCYTES ABSOLUTE: 1.16 E9/L (ref 1.5–4)
LYMPHOCYTES RELATIVE PERCENT: 22.2 % (ref 20–42)
MAGNESIUM: 2.1 MG/DL (ref 1.6–2.6)
MCH RBC QN AUTO: 29.2 PG (ref 26–35)
MCHC RBC AUTO-ENTMCNC: 31.3 % (ref 32–34.5)
MCV RBC AUTO: 93.5 FL (ref 80–99.9)
METER GLUCOSE: 100 MG/DL (ref 74–99)
METER GLUCOSE: 109 MG/DL (ref 74–99)
METER GLUCOSE: 143 MG/DL (ref 74–99)
MONOCYTES ABSOLUTE: 0.61 E9/L (ref 0.1–0.95)
MONOCYTES RELATIVE PERCENT: 11.7 % (ref 2–12)
NEUTROPHILS ABSOLUTE: 3.04 E9/L (ref 1.8–7.3)
NEUTROPHILS RELATIVE PERCENT: 58.1 % (ref 43–80)
PDW BLD-RTO: 18.2 FL (ref 11.5–15)
PHOSPHORUS: 3.1 MG/DL (ref 2.5–4.5)
PLATELET # BLD: 208 E9/L (ref 130–450)
PMV BLD AUTO: 10.6 FL (ref 7–12)
POTASSIUM REFLEX MAGNESIUM: 3.9 MMOL/L (ref 3.5–5)
RBC # BLD: 3.25 E12/L (ref 3.5–5.5)
SODIUM BLD-SCNC: 141 MMOL/L (ref 132–146)
WBC # BLD: 5.2 E9/L (ref 4.5–11.5)

## 2022-06-20 PROCEDURE — 82962 GLUCOSE BLOOD TEST: CPT

## 2022-06-20 PROCEDURE — 6360000002 HC RX W HCPCS: Performed by: FAMILY MEDICINE

## 2022-06-20 PROCEDURE — 90935 HEMODIALYSIS ONE EVALUATION: CPT | Performed by: INTERNAL MEDICINE

## 2022-06-20 PROCEDURE — 6370000000 HC RX 637 (ALT 250 FOR IP): Performed by: FAMILY MEDICINE

## 2022-06-20 PROCEDURE — 2060000000 HC ICU INTERMEDIATE R&B

## 2022-06-20 PROCEDURE — 83735 ASSAY OF MAGNESIUM: CPT

## 2022-06-20 PROCEDURE — 85025 COMPLETE CBC W/AUTO DIFF WBC: CPT

## 2022-06-20 PROCEDURE — 6370000000 HC RX 637 (ALT 250 FOR IP): Performed by: SPECIALIST

## 2022-06-20 PROCEDURE — 80048 BASIC METABOLIC PNL TOTAL CA: CPT

## 2022-06-20 PROCEDURE — 2580000003 HC RX 258: Performed by: FAMILY MEDICINE

## 2022-06-20 PROCEDURE — 94640 AIRWAY INHALATION TREATMENT: CPT

## 2022-06-20 PROCEDURE — 74018 RADEX ABDOMEN 1 VIEW: CPT

## 2022-06-20 PROCEDURE — 84100 ASSAY OF PHOSPHORUS: CPT

## 2022-06-20 PROCEDURE — 36415 COLL VENOUS BLD VENIPUNCTURE: CPT

## 2022-06-20 RX ADMIN — DOXYCYCLINE HYCLATE 100 MG: 100 CAPSULE ORAL at 20:17

## 2022-06-20 RX ADMIN — IPRATROPIUM BROMIDE AND ALBUTEROL SULFATE 1 AMPULE: .5; 2.5 SOLUTION RESPIRATORY (INHALATION) at 09:52

## 2022-06-20 RX ADMIN — METOPROLOL SUCCINATE 25 MG: 25 TABLET, FILM COATED, EXTENDED RELEASE ORAL at 09:34

## 2022-06-20 RX ADMIN — HEPARIN SODIUM 5000 UNITS: 10000 INJECTION INTRAVENOUS; SUBCUTANEOUS at 09:36

## 2022-06-20 RX ADMIN — PIPERACILLIN AND TAZOBACTAM 4500 MG: 4; .5 INJECTION, POWDER, LYOPHILIZED, FOR SOLUTION INTRAVENOUS at 11:10

## 2022-06-20 RX ADMIN — TIMOLOL MALEATE 1 DROP: 5 SOLUTION OPHTHALMIC at 20:17

## 2022-06-20 RX ADMIN — SODIUM CHLORIDE, PRESERVATIVE FREE 10 ML: 5 INJECTION INTRAVENOUS at 20:17

## 2022-06-20 RX ADMIN — BRIMONIDINE TARTRATE 1 DROP: 2 SOLUTION/ DROPS OPHTHALMIC at 09:34

## 2022-06-20 RX ADMIN — ASPIRIN 81 MG CHEWABLE TABLET 81 MG: 81 TABLET CHEWABLE at 09:33

## 2022-06-20 RX ADMIN — TICAGRELOR 90 MG: 90 TABLET ORAL at 20:17

## 2022-06-20 RX ADMIN — ISOSORBIDE MONONITRATE 30 MG: 30 TABLET, EXTENDED RELEASE ORAL at 09:34

## 2022-06-20 RX ADMIN — SODIUM CHLORIDE, PRESERVATIVE FREE 10 ML: 5 INJECTION INTRAVENOUS at 09:34

## 2022-06-20 RX ADMIN — DOXYCYCLINE HYCLATE 100 MG: 100 CAPSULE ORAL at 09:35

## 2022-06-20 RX ADMIN — PANTOPRAZOLE SODIUM 20 MG: 20 TABLET, DELAYED RELEASE ORAL at 04:15

## 2022-06-20 RX ADMIN — BRIMONIDINE TARTRATE 1 DROP: 2 SOLUTION/ DROPS OPHTHALMIC at 20:17

## 2022-06-20 RX ADMIN — ONDANSETRON 4 MG: 4 TABLET, ORALLY DISINTEGRATING ORAL at 01:12

## 2022-06-20 RX ADMIN — IPRATROPIUM BROMIDE AND ALBUTEROL SULFATE 1 AMPULE: .5; 2.5 SOLUTION RESPIRATORY (INHALATION) at 13:22

## 2022-06-20 RX ADMIN — TIMOLOL MALEATE 1 DROP: 5 SOLUTION OPHTHALMIC at 09:35

## 2022-06-20 RX ADMIN — LATANOPROST 1 DROP: 50 SOLUTION OPHTHALMIC at 20:17

## 2022-06-20 RX ADMIN — PIPERACILLIN AND TAZOBACTAM 4500 MG: 4; .5 INJECTION, POWDER, LYOPHILIZED, FOR SOLUTION INTRAVENOUS at 20:19

## 2022-06-20 RX ADMIN — TICAGRELOR 90 MG: 90 TABLET ORAL at 09:33

## 2022-06-20 RX ADMIN — ACETAMINOPHEN 650 MG: 325 TABLET ORAL at 17:55

## 2022-06-20 RX ADMIN — HEPARIN SODIUM 5000 UNITS: 10000 INJECTION INTRAVENOUS; SUBCUTANEOUS at 20:20

## 2022-06-20 ASSESSMENT — PAIN SCALES - GENERAL
PAINLEVEL_OUTOF10: 7
PAINLEVEL_OUTOF10: 0
PAINLEVEL_OUTOF10: 0

## 2022-06-20 NOTE — PROGRESS NOTES
[facility]  Progress Note    Chief complaint :  Chief Complaint   Patient presents with    Shortness of Breath     for a few days per pt    Altered Mental Status     per NH staff, pt able to answer all orientation questions       Subjective:  Patient is awake and alert. Patient remains on room air. Patient states that she had 2 episodes of loose stools yesterday, denies having any blood. Patient states that she has abdominal pain 7 out of 10 but may be radiating from her back. Patient states she also had some urine production with her bowels. Patient is aware of her chronic back pain and concerns including the pain stimulator. Patient states that her coughing has improved, still has some sputum production. Patient denies having any nausea, vomiting, fever, chills. Patient states that she tolerated her diet well yesterday ate the most that she has since she has been here. Past medical, surgical, family and social history were reviewed, non-contributory, and unchanged unless otherwise stated. Objective:  BP (!) 131/55   Pulse 72   Temp 97.8 °F (36.6 °C) (Oral)   Resp 18   Ht 5' 10\" (1.778 m)   Wt 154 lb 5.2 oz (70 kg)   SpO2 92%   BMI 22.14 kg/m²   Vital signs reviewed     Physical Exam  Vitals and nursing note reviewed. Constitutional:       Appearance: Normal appearance. HENT:      Head: Normocephalic and atraumatic. Nose: Nose normal. No congestion or rhinorrhea. Mouth/Throat:      Mouth: Mucous membranes are moist.      Pharynx: Oropharynx is clear. Eyes:      General: No scleral icterus. Conjunctiva/sclera: Conjunctivae normal.   Neck:      Vascular: No carotid bruit. Cardiovascular:      Rate and Rhythm: Normal rate and regular rhythm. Heart sounds: Murmur (2/6 systolic ) heard. No gallop. Pulmonary:      Breath sounds: No wheezing or rales.       Comments: Room air, course breath sounds improving   Abdominal: 3.50 - 5.50 E12/L    Hemoglobin 9.5 (L) 11.5 - 15.5 g/dL    Hematocrit 30.4 (L) 34.0 - 48.0 %    MCV 93.5 80.0 - 99.9 fL    MCH 29.2 26.0 - 35.0 pg    MCHC 31.3 (L) 32.0 - 34.5 %    RDW 18.2 (H) 11.5 - 15.0 fL    Platelets 007 332 - 930 E9/L    MPV 10.6 7.0 - 12.0 fL    Neutrophils % 58.1 43.0 - 80.0 %    Immature Granulocytes % 1.1 0.0 - 5.0 %    Lymphocytes % 22.2 20.0 - 42.0 %    Monocytes % 11.7 2.0 - 12.0 %    Eosinophils % 5.9 0.0 - 6.0 %    Basophils % 1.0 0.0 - 2.0 %    Neutrophils Absolute 3.04 1.80 - 7.30 E9/L    Immature Granulocytes # 0.06 E9/L    Lymphocytes Absolute 1.16 (L) 1.50 - 4.00 E9/L    Monocytes Absolute 0.61 0.10 - 0.95 E9/L    Eosinophils Absolute 0.31 0.05 - 0.50 E9/L    Basophils Absolute 0.05 0.00 - 0.20 E9/L   Phosphorus    Collection Time: 06/20/22  4:19 AM   Result Value Ref Range    Phosphorus 3.1 2.5 - 4.5 mg/dL   Magnesium    Collection Time: 06/20/22  4:19 AM   Result Value Ref Range    Magnesium 2.1 1.6 - 2.6 mg/dL   POCT Glucose    Collection Time: 06/20/22  6:14 AM   Result Value Ref Range    Meter Glucose 109 (H) 74 - 99 mg/dL       Radiology and other tests reviewed:  Fluoroscopy modified barium swallow with video   Final Result   1. No barium aspiration or significant swallowing deficits. 2.  Transient laryngeal penetration with thin liquid barium. 3.  Cervical spine degenerative spondylosis. 4.  Please see separate speech pathology report for full discussion of   findings and recommendations. CT HEAD WO CONTRAST   Final Result   No acute intracranial abnormality. CT CHEST WO CONTRAST   Final Result   1. New ground-glass and airspace consolidations throughout right lung notable   in right lower lobe as well as minimal opacities in left lower lobe   suggesting interstitial pulmonary edema or bilateral pneumonia. 2. Cardiomegaly with pulmonary vascular congestion. 3. Multiple stable prominent mediastinal lymph nodes.          XR CHEST PORTABLE Final Result   No acute process. Assessment:  Active Hospital Problems    Diagnosis Date Noted    Patient is Sikhism [Z78.9] 11/07/2017     Priority: High     Class: Chronic    Healthcare-associated pneumonia [J18.9] 06/16/2022     Priority: Medium    Hypoxia [R09.02] 06/16/2022     Priority: Medium    Ischemic cardiomyopathy [I25.5]      Priority: Medium    AMS (altered mental status) [R41.82] 06/15/2022     Priority: Medium    Elevated troponin [R77.8] 02/17/2017     Priority: Medium    CAD in native artery [I25.10] 12/09/2021    Mitral valve disease [I05.9] 11/11/2021    Lymphedema of right upper extremity [I89.0]     ESRD (end stage renal disease) on dialysis ([facility] [address] [N18.6, Z99.2]     Diabetic peripheral neuropathy ([facility] [address] [E11.42] 08/30/2018    Controlled type 2 diabetes mellitus with chronic kidney disease on chronic dialysis, with long-term current use of insulin ([facility]) [E11.22, N18.6, Z79.4, Z99.2] 02/22/2017    Glaucoma [H40.9]        Plan:  Bilateral lower lobe Health-care associated pneumonia  CT head showed no acute intracranial abnormality, ED chest positive for new groundglass and airspace consolidations throughout the right lung, cardiomegaly with pulmonary vascular congestion, multiple stable prominent mediastinal lymph nodes.   Influenza AMB not detected COVID-19 negative  Respiratory panel positive for SARS-Cov-2 -4 weeks ago, no need for treatments or isolation at this time  Patient on room air   Zosyn 4.5 g Q12H, (day 6)  Duonebs Q4H PRN  Guaifenesin 400mg QID PRN  Chest therapy, incentive spirometer, flutter valve  Urine and blood cultures pending  CBC, BMP daily  ID following appreciate input    Abdominal pain  Overnight history of 2 times loose stools  Continue to monitor  Adjusted diet  Consider imaging if pain persist.    Vertebral osteomyelitis  Stable  Completed 6 weeks of IV vancomycin 750 mg IV antibiotic after dialysis MWF  Doxycyline 100 mg Q12H for suppression of osteomyelitis. ID following appreciate input    Mild to moderate pharyngeal phase dysphagia  Fluoroscopic modified barium swallow study showed no barium aspiration no significant swallowing deficits, transient laryngeal penetration is within thin liquid barium, cervical spine degenerative spondylitis  Avoid thin liquids with a straw    Exposed PowerPort  Blood cultures negative  ID following appreciate input    End-stage renal disease hemodialysis on Monday Wednesday Friday  Creatinine baseline 3, creatinine 5.2 today  Continue home Fosrenol 1 g TID- held  Continue Bumex 2mg  BMP, mag, Phos daily  Nephrology following appreciate input    CAD with NSTEMI 1 month ago  EKG showed normal sinus rhythm, possible left atrial enlargement  Continue home metoprolol 25 mg daily, Imdur 30 mg daily, ticagrelor 90mg BID   Troponin 185, 231   Repeat EKG Normal sinus rhythm, low voltage QRS.      CHF  Last echo with EF of 40 to 45%, dilated LA.  proBNP 70,000  Monitor for fluid overload  Daily weights  Strict ins and outs  Continue home metoprolol succinate 25 mg daily, Imdur 30 mg daily    Anemia of chronic disease   No blood to be given to patient  Daily CBC   hemoglobin today 10.0    Insulin-dependent diabetes type 2  Last A1c 5.1  POCT glucose checks  Per glycemia protocol    History of hypertension  Currently soft blood pressures  Midodrine 5 mg daily as needed  Monitor blood pressures    Glaucoma right eye   bromonidine 0.2% ophthalmic solution 1 drop BID   Latanoprost 0.005% ophthalmic solution 1 drop  Nightly   Timolol 0.5% ophthalmic solution 1 drop BID     Other UDS screen negative    DVT ppx: Heparin 5000 units TID  GI ppx: none  Code Status: Full  Diet: Carb controlled -no straw use    Disposition: Discharge to pending clinical course  Roslyn Morrow MD  Family Medicine [id number]

## 2022-06-20 NOTE — PROGRESS NOTES
[address]  Progress Note    Chief complaint :  Chief Complaint   Patient presents with    Shortness of Breath     for a few days per pt    Altered Mental Status     per NH staff, pt able to answer all orientation questions       Subjective:  Patient covered in blankets cold up in a ball on the side of the bed. Patient states that she continues to have abdominal pain. Patient states that she had loose stools yesterday during hemodialysis. Patient is aware of her CT contrast this morning and additional hemodialysis after. Patient states that she still has sputum production and tries to cough it all up. Patient on room air and denies having any shortness of breath. Patient denies having any nausea, vomiting, fever, chills. Past medical, surgical, family and social history were reviewed, non-contributory, and unchanged unless otherwise stated. Objective:  BP (!) 162/55   Pulse 70   Temp 98.5 °F (36.9 °C) (Oral)   Resp 16   Ht 5' 10\" (1.778 m)   Wt 154 lb 5.2 oz (70 kg)   SpO2 93%   BMI 22.14 kg/m²   Vital signs reviewed     Physical Exam  Vitals and nursing note reviewed. Constitutional:       Comments: Curled in a ball on one edge of the bed   HENT:      Head: Normocephalic and atraumatic. Nose: Nose normal. No congestion or rhinorrhea. Mouth/Throat:      Mouth: Mucous membranes are moist.      Pharynx: Oropharynx is clear. Eyes:      General: No scleral icterus. Conjunctiva/sclera: Conjunctivae normal.   Neck:      Vascular: No carotid bruit. Cardiovascular:      Rate and Rhythm: Normal rate and regular rhythm. Heart sounds: Murmur (2/6 systolic ) heard. No gallop. Pulmonary:      Breath sounds: No wheezing or rales. Comments: Room air, course breath sounds worse prior to cough    Abdominal:      General: Bowel sounds are normal. There is no distension. Palpations: Abdomen is soft. There is no mass.       Tenderness: - 15.0 fL    Platelets 853 934 - 137 E9/L    MPV 10.6 7.0 - 12.0 fL    Neutrophils % 58.1 43.0 - 80.0 %    Immature Granulocytes % 1.1 0.0 - 5.0 %    Lymphocytes % 22.2 20.0 - 42.0 %    Monocytes % 11.7 2.0 - 12.0 %    Eosinophils % 5.9 0.0 - 6.0 %    Basophils % 1.0 0.0 - 2.0 %    Neutrophils Absolute 3.04 1.80 - 7.30 E9/L    Immature Granulocytes # 0.06 E9/L    Lymphocytes Absolute 1.16 (L) 1.50 - 4.00 E9/L    Monocytes Absolute 0.61 0.10 - 0.95 E9/L    Eosinophils Absolute 0.31 0.05 - 0.50 E9/L    Basophils Absolute 0.05 0.00 - 0.20 E9/L   Phosphorus    Collection Time: 06/20/22  4:19 AM   Result Value Ref Range    Phosphorus 3.1 2.5 - 4.5 mg/dL   Magnesium    Collection Time: 06/20/22  4:19 AM   Result Value Ref Range    Magnesium 2.1 1.6 - 2.6 mg/dL   POCT Glucose    Collection Time: 06/20/22  6:14 AM   Result Value Ref Range    Meter Glucose 109 (H) 74 - 99 mg/dL   POCT Glucose    Collection Time: 06/20/22 12:12 PM   Result Value Ref Range    Meter Glucose 143 (H) 74 - 99 mg/dL       Radiology and other tests reviewed:  XR ABDOMEN (KUB) (SINGLE AP VIEW)   Final Result   Colonic diverticulosis. Fluoroscopy modified barium swallow with video   Final Result   1. No barium aspiration or significant swallowing deficits. 2.  Transient laryngeal penetration with thin liquid barium. 3.  Cervical spine degenerative spondylosis. 4.  Please see separate speech pathology report for full discussion of   findings and recommendations. CT HEAD WO CONTRAST   Final Result   No acute intracranial abnormality. CT CHEST WO CONTRAST   Final Result   1. New ground-glass and airspace consolidations throughout right lung notable   in right lower lobe as well as minimal opacities in left lower lobe   suggesting interstitial pulmonary edema or bilateral pneumonia. 2. Cardiomegaly with pulmonary vascular congestion. 3. Multiple stable prominent mediastinal lymph nodes.          XR CHEST PORTABLE Final Result   No acute process. Assessment:  Active Hospital Problems    Diagnosis Date Noted    Patient is Christian [Z78.9] 11/07/2017     Priority: High     Class: Chronic    Healthcare-associated pneumonia [J18.9] 06/16/2022     Priority: Medium    Hypoxia [R09.02] 06/16/2022     Priority: Medium    Ischemic cardiomyopathy [I25.5]      Priority: Medium    AMS (altered mental status) [R41.82] 06/15/2022     Priority: Medium    Elevated troponin [R77.8] 02/17/2017     Priority: Medium    CAD in native artery [I25.10] 12/09/2021    Mitral valve disease [I05.9] 11/11/2021    Lymphedema of right upper extremity [I89.0]     ESRD (end stage renal disease) on dialysis ([facility] [N18.6, Z99.2]     Diabetic peripheral neuropathy ([facility] [E11.42] 08/30/2018    Controlled type 2 diabetes mellitus with chronic kidney disease on chronic dialysis, with long-term current use of insulin ([facility] [E11.22, N18.6, Z79.4, Z99.2] 02/22/2017    Glaucoma [H40.9]        Plan:  Bilateral lower lobe Health-care associated pneumonia  Patient on room air   Zosyn 4.5 g Q12H, (day 6)   Duonebs Q4H PRN  Guaifenesin 400mg QID PRN  Chest therapy, incentive spirometer,   CBC, BMP daily  ID following appreciate input    Colonic diverticulosis  New onset abdominal pain, concerns for diverticulitis  KUB showed colonic diverticulosis, dilute contrast and lower GI tract no free air or bowel wall pneumonitis or dilated bowel.   CT contrast abdomen and pelvis ordered-contrast okay per nephrology- additional HD after imaging   Pain control-morphine 1 mg once  Continue to monitor    Vertebral osteomyelitis  Stable  Completed 6 weeks of IV vancomycin 750 mg IV antibiotic after dialysis MWF  Doxycyline 100 mg Q12H for suppression of osteomyelitis. -Will continue when discharged  ID following appreciate input    Mild to moderate pharyngeal phase dysphagia  Fluoroscopic modified barium swallow study showed no barium aspiration no significant swallowing deficits, transient laryngeal penetration is within thin liquid barium, cervical spine degenerative spondylitis  Avoid thin liquids with a straw    Exposed PowerPort  Blood cultures negative  ID following appreciate input    End-stage renal disease hemodialysis on Monday Wednesday Friday  Creatinine baseline 3, creatinine pending this morning   Continue home Fosrenol 1 g TID- held  Continue Bumex 2mg  BMP, mag, Phos daily  Nephrology following appreciate input    CAD with NSTEMI 1 month ago  Continue home metoprolol 25 mg daily, Imdur 30 mg daily, ticagrelor 90mg BID     CHF  Last echo with EF of 40 to 45%, dilated LA.  proBNP 70,000  Monitor for fluid overload  Daily weights  Strict ins and outs  Continue home metoprolol succinate 25 mg daily, Imdur 30 mg daily    Anemia of chronic disease   No blood to be given to patient  Daily CBC   hemoglobin today - morning labs pending     Insulin-dependent diabetes type 2  Last A1c 5.1  POCT glucose checks  Per glycemia protocol    History of hypertension  Currently soft blood pressures  Midodrine 5 mg daily as needed  Monitor blood pressures    Glaucoma right eye   bromonidine 0.2% ophthalmic solution 1 drop BID   Latanoprost 0.005% ophthalmic solution 1 drop  Nightly   Timolol 0.5% ophthalmic solution 1 drop BID     Other UDS screen negative    DVT ppx: Heparin 5000 units TID  GI ppx: none  Code Status: Full  Diet: Carb controlled -no straw use    Disposition: Discharge to pending clinical course  Dawn Baugh MD  Family Medicine [id number]

## 2022-06-20 NOTE — PROGRESS NOTES
Physician Progress Note      PATIENTDennis Reed  CSN #:                  [id number]  :                       1955  ADMIT DATE:       6/15/2022 6:13 PM  [address] [place] DATE:  RESPONDING  PROVIDER #:        Jono Beltran MD          QUERY TEXT:    Pt admitted with pneumonia. Pt noted to have AMS on admission. If possible,   please document in the progress notes and discharge summary if you are   evaluating and / or treating any of the following: The medical record reflects the following:  Risk Factors: pneumonia,  ESRD  Clinical Indicators: per ED \"Symptoms described as increased sob, with   intermittent confusion per . \"  H&P \"Per patient's family present in the room, patient became lethargic and   was not herself earlier today, prompting presentation. Upon exam, patient is   alert and oriented x3.\"   PN \"AMS, likely d/t pneumonia, improved. \"   PN \"AMS, likely d/t pneumonia, resolved. \"  Treatment: IV Zosyn, po Doxycycline    Thank you,  Maki Hu RN, CCDS  Clinical Documentation Improvement Specialist  [email or web address]  [phone]  Options provided:  -- Metabolic encephalopathy  -- Other - I will add my own diagnosis  -- Disagree - Not applicable / Not valid  -- Disagree - Clinically unable to determine / Unknown  -- Refer to Clinical Documentation Reviewer    PROVIDER RESPONSE TEXT:    Provider is clinically unable to determine a response to this query.     Query created by: Dg Stanley on 2022 12:38 PM      Electronically signed by:  Jono Beltran MD 2022 2:30 PM

## 2022-06-20 NOTE — PROGRESS NOTES
[facility]  Nephrology Progress Note    Patient's Name: Venancio Lamb      History of Present Illness-full consult deferred as pt followed longitudinally at dialysis:    \"Claudia Chapman is a 78 y.o. female with a past medical history of breast cancer, coronary artery disease, hypertension, hyperlipidemia, and anemia. She presented to the [facility] ED on 5/2 with complaints of back and abdominal pain patient was subsequently transferred to [facility] for NSTEMI. Patient is known to our service and dialyzes at [address] MWF 1st shift left AV fistula. In April she was Dx with DOMINICK zimmerman and is treated with iv Vanc  And has a mediport in the R  Pt is now admitted from the ED for cough and hypoxia. She came to the ED after having ongoing pain in the RLE when sitting, no significant pain when standing or using her walker. She notes since 6 days ago she had a cough productive of grey sputum. Resp Viral Panel (+) for SARS-CoV-2\"    6/20/22- Alert, resting in bed. Feeling nauseated today. Received Zofran earlier. Is awaiting dialysis. PMH:    Past Medical History:   Diagnosis Date    Acute infection of bone ([address]     infection of rt foot, resolved.     Acute osteomyelitis of phalanx of left hand ([address] 1/27/2022    Left third distal phalanx    Anemia of chronic disease     Arthritis     Breast cancer ([address]     right breast, 2008/ bladder, 2006- last chemotherapy \"years ago\"    CAD (coronary artery disease)     Carpal tunnel syndrome     bilat - for OR left hand 3-17-20     Chronic diastolic CHF (congestive heart failure) ([address] 09/23/2014 9/23/14- echocardiogram revealed moderate LV concentric hypertrophy, stage III diastolic dysfunction, mild tricuspid regurgitation    CKD (chronic kidney disease) stage 4, GFR 15-29 ml/min ([facility])     Diabetic retinopathy ([address]     Glaucoma     Hemodialysis patient ([address]     [facility]- Dr. Ita Barrera - left arm fistula  Hyperkalemia, diminished renal excretion 11/9/2017    Hyperlipidemia     Hypertension     Hypoglycemia unawareness in type 1 diabetes mellitus ([address] 11/7/2017    Insulin dependent type 2 diabetes mellitus (HCC)     Neuropathy     feet    Osteomyelitis due to secondary diabetes ([address]     rt great toe with amputation    Patient is Confucianist 11/7/2017    Refusal of blood product     patient states she dose not take blood transfusion    Ventricular hypertrophy     Ventricular tachycardia ([address] 5/24/2021    Vitreous hemorrhage (HCC)     left eye     Patient Active Problem List   Diagnosis    Diabetic retinopathy ([address]    Malignant neoplasm of right female breast ([address]    Atherosclerosis of native coronary artery of native heart without angina pectoris    Moderate obesity    Left ventricular hypertrophy    Herniated lumbar intervertebral disc    Lumbar degenerative disc disease    Pseudomeningocele    Lumbar radiculopathy    Lymphedema of arm    Anemia of chronic disease    Chronic diastolic CHF (congestive heart failure) ([address]    Hypertension    Glaucoma    Refusal of blood product    Elevated troponin    Controlled type 2 diabetes mellitus with chronic kidney disease on chronic dialysis, with long-term current use of insulin (HCC)    Vitreous hemorrhage ([address]    Patient is Confucianist    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 ([address]    Chronic pain syndrome    Lumbar post-laminectomy syndrome    Cervicalgia    Diabetic peripheral neuropathy ([address]    ESRD (end stage renal disease) ([address]    Bilateral carpal tunnel syndrome    Cardiac arrest ([address]    ESRD (end stage renal disease) on dialysis ([address]    Mixed hyperlipidemia    Lymphedema of right upper extremity    Coronary artery disease involving native coronary artery of native heart with angina pectoris ([address]    Mitral valve disease    CAD in native artery    Lumbar stenosis without skin nightly       metoprolol succinate (TOPROL XL) 25 MG extended release tablet Take 1 tablet by mouth daily 90 tablet 1    lanthanum (FOSRENOL) 1000 MG chewable tablet Take 1,000 mg by mouth 3 times daily (with meals)       allopurinol (ZYLOPRIM) 100 MG tablet Take 1 tablet by mouth daily 90 tablet 1    ticagrelor (BRILINTA) 90 MG TABS tablet Take 1 tablet by mouth 2 times daily 180 tablet 1    B Complex-C-Folic Acid (BONI CAPS) 1 MG CAPS Take 1 mg by mouth nightly       omeprazole (PRILOSEC) 20 MG delayed release capsule Take 20 mg by mouth daily as needed       LUMIGAN 0.01 % SOLN ophthalmic drops Place 1 drop into the right eye nightly       COMBIGAN 0.2-0.5 % ophthalmic solution Place 1 drop into the right eye every 12 hours   7    aspirin 81 MG EC tablet Take 1 tablet by mouth daily 30 tablet 0    midodrine (PROAMATINE) 5 MG tablet Take 5 mg by mouth daily as needed      lidocaine-prilocaine (EMLA) 2.5-2.5 % cream Apply topically as needed for Pain        Allergies:    Furosemide    Social History:     reports that she has never smoked. She has never used smokeless tobacco. She reports that she does not drink alcohol and does not use drugs.     Family History:         Problem Relation Age of Onset    Breast Cancer Mother 61    Hypertension Mother     Heart Disease Father     Prostate Cancer Father     Breast Cancer Maternal Grandmother 61     Physical Exam:    Patient Vitals for the past 24 hrs:   BP Temp Temp src Pulse Resp SpO2 Weight   06/20/22 1430 (!) 160/50 -- -- 69 -- -- --   06/20/22 1406 (!) 179/53 -- -- 70 -- -- --   06/20/22 1345 (!) 162/55 -- -- 70 16 -- 154 lb 5.2 oz (70 kg)   06/20/22 1322 -- -- -- 79 16 93 % --   06/20/22 0952 -- -- -- 75 16 95 % --   06/20/22 0730 (!) 128/48 98.5 °F (36.9 °C) Oral 69 18 94 % --   06/20/22 0600 -- -- -- -- -- -- 154 lb 5.2 oz (70 kg)   06/20/22 0400 (!) 131/55 97.8 °F (36.6 °C) Oral 72 18 92 % --   06/19/22 2130 (!) 85/45 97.9 °F (36.6 °C) Oral 70 18 98 % --   06/19/22 1628 -- -- -- 76 18 96 % --     No intake or output data in the 24 hours ending 06/20/22 1443  General: Awake, alert, no acute distress  Neck: No JVD noted  Lungs: diminished throughout, no adventitious sounds. Mediport in the R chest  CV: RRR, no rub  Abd: Soft, nontender, nondistended. Active bowel sounds  Skin: Warm and dry. No rash on exposed extremities  Ext: 1+ RUE edema (h/o breast CA); No BLE edema ; left AV fistula in the upper ext, +thrill, +bruit  Neuro: Alert & oriented, answers questions appropriately    Data:    Recent Labs     06/18/22  0200 06/19/22 0420 06/20/22  0419   WBC 4.7 4.4* 5.2   HGB 9.5* 10.0* 9.5*   HCT 31.2* 32.6* 30.4*   MCV 95.7 92.6 93.5    200 208     Recent Labs     06/18/22  0200 06/19/22  0420 06/20/22  0419    140 141   K 3.7 4.0 3.9   CL 99 101 101   CO2 25 27 25   CREATININE 2.8* 4.1* 5.2*   BUN 13 20 29*   LABGLOM 20 13 10   GLUCOSE 86 102* 115*   CALCIUM 8.9 9.3 9.2   PHOS 2.1* 2.7 3.1   MG 2.1 2.3 2.1     Vit D, 25-Hydroxy   Date Value Ref Range Status   03/11/2022 28 (L) 30 - 100 ng/mL Final     Comment:     <20 ng/mL. ........... Vearl Pippins Deficient  20-30 ng/mL. ......... Vearl Pippins Insufficient   ng/mL. ........ Vearl Pippins Sufficient  >100 ng/mL. .......... Vearl Pippins Toxic       PTH   Date Value Ref Range Status   03/11/2022 241 (H) 15 - 65 pg/mL Final     No results for input(s): ALT, AST, ALKPHOS, BILITOT, BILIDIR in the last 72 hours. No results for input(s): LABALBU in the last 72 hours.   Ferritin   Date Value Ref Range Status   06/15/2022 2,221 ng/mL Final     Comment:     FERRITIN Reference Ranges:  Adult Males   20 - 60 years:    30 - 400 ng/mL  Adult females 16 - 61 years:    15 - 150 ng/mL  Adults greater than 60 years:   no established reference range  Pediatrics:                     no established reference range       Iron   Date Value Ref Range Status   06/15/2022 31 (L) 37 - 145 mcg/dL Final     TIBC   Date Value Ref Range Status   06/15/2022 139 (L) 250 - 450 mcg/dL Final     Vitamin B-12   Date Value Ref Range Status   02/17/2017 1802 (H) 211 - 946 pg/mL Final     Folate   Date Value Ref Range Status   02/17/2017 >20.0 4.8 - 24.2 ng/mL Final     Lab Results   Component Value Date    COLORU Yellow 11/07/2017    NITRU Negative 11/07/2017    GLUCOSEU Negative 11/07/2017    GLUCOSEU NEGATIVE 08/27/2011    KETUA Negative 11/07/2017    UROBILINOGEN 0.2 11/07/2017    BILIRUBINUR Negative 11/07/2017    BILIRUBINUR NEGATIVE 08/27/2011     No results found for: ALVINO, CREURRAN, MACREATRATIO, OSMOU    No components found for: URIC    No results found for: LIPIDPAN    Assessment and Plans:    1. ESKD on HD  OP [facility] MWF 1st shift   left AV fistula  PLAN:  1. Continue HD MWF  2. Dialysis today    2. Recent  NSTEMI  S/p cardiac catheterization with balloon angioplasty 5/5  Cardiology previously following    3. Back pain  S/p bone biopsy 4/18 for lumbar spine osteomyelitis  Had been on vancomycin for this  Now on zosyn and vibramycin   ID following    4. Hypertension  BP goal<140/90  BP at goal  PLAN:  1. Monitor on current regimen and with HD    5. Anemia  Hemoglobin target 10-12  Hemoglobin 9.5 today  PLAN:  1. Continue JAYSHREE as hemoglobin below target  2. Transfuse for hemoglobin<7  3. Monitor H&H    6.  Secondary hyperparathyroidism of renal origin  PTH noted in the outpatient setting on 4/25/2022 was 443  Phosphorus 2.1-->2.7-->3.1  PLAN:  Continue to Hold Fosrenol and recheck PO4 in the am  Monitor labs    7. Asp Pne/HCAP with recent COVID 19 Pne      GOYO Hillman - CNS   Patient seen and examined. I had a face to face encounter with the patient. She was seen on IHD this PM and states she feels better. I spoke with Family practice who would like to get a CT Scan of the Abd and Pelvis with IV contrast  Agree with exam.    Agree with  formulation, assessment and plan as outlined above and directed by me.    Addition and corrections were done as deemed appropriate. My exam and plan include:     A/P:  1. ESKD -seen on IHD attempting 3L vol removal  PLAN:  1. IHD again 6/21/22 as pt to have CT Scan with IV contrast-pt understands and agrees    Continue current treatment.     TRES Flores MD

## 2022-06-20 NOTE — PROGRESS NOTES
P Quality Flow/Interdisciplinary Rounds Progress Note        Quality Flow Rounds held on June 20, 2022    Disciplines Attending:  Bedside Nurse, ,  and Nursing Unit Leadership    Brando Austin was admitted on 6/15/2022  6:13 PM    Anticipated Discharge Date:       Disposition:    Minor Score:  Minor Scale Score: 19    Readmission Risk              Risk of Unplanned Readmission:  41           Discussed patient goal for the day, patient clinical progression, and barriers to discharge. The following Goal(s) of the Day/Commitment(s) have been identified:  IV Zosyn.       Alessandro Christopher, RN  June 20, 2022

## 2022-06-20 NOTE — PROGRESS NOTES
[address] [place] [address] [facility]  [facility]  Progress Note    SUBJECTIVE:  Chief Complaint   Patient presents with    Shortness of Breath     for a few days per pt    Altered Mental Status     per NH staff, pt able to answer all orientation questions     Patient is tolerating medications. No reported adverse drug reactions. No nausea, vomiting, diarrhea. - started having loose stools overnight  + cough, Bringing up a little bit of greenish sputum occasionally. Having abdominal pain this AM  On RA no fevers overnight    Review of systems:  As stated above in the chief complaint, otherwise negative. Medications:  Scheduled Meds:   ipratropium-albuterol  1 ampule Inhalation 4x daily    doxycycline hyclate  100 mg Oral 2 times per day    bumetanide  2 mg Oral Once per day on  Sat    isosorbide mononitrate  30 mg Oral Daily    latanoprost  1 drop Right Eye Nightly    metoprolol succinate  25 mg Oral Daily    pantoprazole  20 mg Oral QAM AC    ticagrelor  90 mg Oral BID    sodium chloride flush  5-40 mL IntraVENous 2 times per day    [Held by provider] lanthanum  1,000 mg Oral TID WC    heparin (porcine)  5,000 Units SubCUTAneous TID    piperacillin-tazobactam  4,500 mg IntraVENous Q12H    brimonidine  1 drop Right Eye BID    timolol  1 drop Right Eye BID    aspirin  81 mg Oral Daily     Continuous Infusions:   sodium chloride 25 mL (22)    dextrose       PRN Meds:guaiFENesin, midodrine, sodium chloride flush, sodium chloride, ondansetron **OR** ondansetron, polyethylene glycol, acetaminophen **OR** acetaminophen, glucose, dextrose bolus **OR** dextrose bolus, glucagon (rDNA), dextrose    OBJECTIVE:  BP (!) 128/48   Pulse 69   Temp 98.5 °F (36.9 °C) (Oral)   Resp 18   Ht 5' 10\" (1.778 m)   Wt 154 lb 5.2 oz (70 kg)   SpO2 94%   BMI 22.14 kg/m²   Temp  Av °F (36.7 °C)  Min: 97.8 °F (36.6 °C)  Max: 98.5 °F (36.9 °C)  Constitutional: The patient is awake, alert, and oriented. Laying in bed  Skin: Warm and dry. No rashes were noted. HEENT: Round and reactive pupils. Moist mucous membranes. No ulcerations or thrush. Neck: Supple to movements. Chest: No use of accessory muscles to breathe. Symmetrical expansion. No wheezing, crackles. R mastectomy, little bit of scattered rhonchi. Clears with cough. RA  Cardiovascular: S1 and S2 are rhythmic and regular. No murmurs appreciated. Abdomen: Positive bowel sounds to auscultation. Benign to palpation. No masses felt. No hepatosplenomegaly.   Extremities: No clubbing, no cyanosis, no edema except for lymphedema  postmastectomy on the right  Lines: peripheral   R chest [facility]      Laboratory and Tests Review:  Lab Results   Component Value Date    WBC 5.2 06/20/2022    WBC 4.4 (L) 06/19/2022    WBC 4.7 06/18/2022    HGB 9.5 (L) 06/20/2022    HCT 30.4 (L) 06/20/2022    MCV 93.5 06/20/2022     06/20/2022     Lab Results   Component Value Date    NEUTROABS 3.04 06/20/2022    NEUTROABS 2.35 06/19/2022    NEUTROABS 3.01 06/18/2022     Lab Results   Component Value Date    CRPHS 0.7 08/16/2016    CRPHS 2.6 04/05/2016    CRPHS 7.6 (H) 01/28/2015     Lab Results   Component Value Date    ALT 16 06/15/2022    AST 19 06/15/2022    ALKPHOS 155 (H) 06/15/2022    BILITOT 0.7 06/15/2022     Lab Results   Component Value Date     06/20/2022    K 3.9 06/20/2022     06/20/2022    CO2 25 06/20/2022    BUN 29 06/20/2022    CREATININE 5.2 06/20/2022    CREATININE 4.1 06/19/2022    CREATININE 2.8 06/18/2022    GFRAA 10 06/20/2022    LABGLOM 10 06/20/2022    GLUCOSE 115 06/20/2022    GLUCOSE 46 04/02/2012    PROT 6.7 06/15/2022    LABALBU 3.5 06/15/2022    LABALBU 3.8 04/02/2012    CALCIUM 9.2 06/20/2022    BILITOT 0.7 06/15/2022    ALKPHOS 155 06/15/2022    AST 19 06/15/2022    ALT 16 06/15/2022     Lab Results   Component Value Date    CRP 7.7 (H) 04/13/2022    CRP 0.7 (H) 01/20/2022    CRP 1.6 (H) 12/20/2021     Lab Results   Component Value Date    SEDRATE 30 (H) 06/15/2022    SEDRATE 80 (H) 04/13/2022    SEDRATE 19 01/20/2022     Radiology:  Reviewed    Microbiology:   Respiratory Culture 6/16/22: few PMNL, moderate GPC in pairs  Blood Cultures 6/15/22: negative so far  COVID-19 PCR positive on respiratory array    ASSESSMENT:  Aspiration pneumonia pneumonia/HCAP  Vertebral osteomyelitis and infected hardware lumbar spine-suppressive doxycycline ongoing; just finished 6 weeks of Vanco with hemodialysis  Respiratory viral panel positive for COVID by PCR; rapid test was negative but clinically CAT scan reflects bacterial pneumonia at this point I believe the COVID is asymptomatic. Mild leukopenia    PLAN:  Continue Zosyn and Doxycycline   Will need to DC on Doxy for suppression  Speech following: video swallow completed-laryngeal penetration with thin liquids  Follow any evolution of COVID-19  Check final cultures  Monitor labs    GOYO Patel - CNP  9:35 AM  6/20/2022   Pt seen and examined. Above discussed agree with advanced practice nurse. Labs, cultures, and radiographs reviewed. Face to Face encounter occurred. Changes made as necessary.      Henrry Blake MD

## 2022-06-20 NOTE — FLOWSHEET NOTE
Pt completed 3.5 hrs of HD on a 3K bath with 1.7L of UF removed safely. 06/20/22 1717   Vital Signs   BP (!) 148/90   Temp 98.5 °F (36.9 °C)   Heart Rate 67   Resp 16   Weight 150 lb 9.2 oz (68.3 kg)   Weight Method Bed scale   Percent Weight Change -2.43   Pain Assessment   Pain Assessment None - Denies Pain   Pain Level 0   Post-Hemodialysis Assessment   Post-Treatment Procedures Blood returned; Access bleeding time < 10 minutes   Machine Disinfection Process Acid/Vinegar Clean;Heat Disinfect; Exterior Machine Disinfection   Rinseback Volume (ml) 300 ml   Total Liters Processed (l/min) 69 l/min   Dialyzer Clearance Moderately streaked   Duration of Treatment (minutes) 210 minutes   Hemodialysis Output (ml) 2000 ml   Tolerated Treatment Good   Patient Response to Treatment tolerated well; post report to [facility] RN   Bilateral Breath Sounds Diminished   Edema Right upper extremity; Left upper extremity;Right lower extremity; Left lower extremity   RUE Edema +1;Pitting   LUE Edema +1;Pitting   RLE Edema +1;Non-pitting   LLE Edema +1;Non-pitting   Time Off 1707   Patient Disposition Return to room

## 2022-06-20 NOTE — PROGRESS NOTES
Occupational Therapy      Occupational Therapy referral received.   Attempt made - patient out of the room - dialysis

## 2022-06-21 ENCOUNTER — APPOINTMENT (OUTPATIENT)
Dept: CT IMAGING | Age: 67
DRG: 177 | End: 2022-06-21
Payer: COMMERCIAL

## 2022-06-21 LAB
ANION GAP SERPL CALCULATED.3IONS-SCNC: 14 MMOL/L (ref 7–16)
BASOPHILS ABSOLUTE: 0.03 E9/L (ref 0–0.2)
BASOPHILS RELATIVE PERCENT: 0.7 % (ref 0–2)
BLOOD CULTURE, ROUTINE: NORMAL
BUN BLDV-MCNC: 18 MG/DL (ref 6–23)
CALCIUM SERPL-MCNC: 9.3 MG/DL (ref 8.6–10.2)
CHLORIDE BLD-SCNC: 101 MMOL/L (ref 98–107)
CO2: 25 MMOL/L (ref 22–29)
CREAT SERPL-MCNC: 3.9 MG/DL (ref 0.5–1)
CULTURE, BLOOD 2: NORMAL
EOSINOPHILS ABSOLUTE: 0.17 E9/L (ref 0.05–0.5)
EOSINOPHILS RELATIVE PERCENT: 3.9 % (ref 0–6)
GFR AFRICAN AMERICAN: 14
GFR NON-AFRICAN AMERICAN: 14 ML/MIN/1.73
GLUCOSE BLD-MCNC: 101 MG/DL (ref 74–99)
HCT VFR BLD CALC: 33 % (ref 34–48)
HEMOGLOBIN: 10.2 G/DL (ref 11.5–15.5)
IMMATURE GRANULOCYTES #: 0.06 E9/L
IMMATURE GRANULOCYTES %: 1.4 % (ref 0–5)
LYMPHOCYTES ABSOLUTE: 0.94 E9/L (ref 1.5–4)
LYMPHOCYTES RELATIVE PERCENT: 21.7 % (ref 20–42)
MAGNESIUM: 2 MG/DL (ref 1.6–2.6)
MCH RBC QN AUTO: 28.3 PG (ref 26–35)
MCHC RBC AUTO-ENTMCNC: 30.9 % (ref 32–34.5)
MCV RBC AUTO: 91.7 FL (ref 80–99.9)
METER GLUCOSE: 106 MG/DL (ref 74–99)
METER GLUCOSE: 131 MG/DL (ref 74–99)
METER GLUCOSE: 82 MG/DL (ref 74–99)
METER GLUCOSE: 87 MG/DL (ref 74–99)
MONOCYTES ABSOLUTE: 0.54 E9/L (ref 0.1–0.95)
MONOCYTES RELATIVE PERCENT: 12.5 % (ref 2–12)
NEUTROPHILS ABSOLUTE: 2.59 E9/L (ref 1.8–7.3)
NEUTROPHILS RELATIVE PERCENT: 59.8 % (ref 43–80)
PDW BLD-RTO: 18.2 FL (ref 11.5–15)
PHOSPHORUS: 2.9 MG/DL (ref 2.5–4.5)
PLATELET # BLD: 222 E9/L (ref 130–450)
PMV BLD AUTO: 10 FL (ref 7–12)
POTASSIUM REFLEX MAGNESIUM: 3.8 MMOL/L (ref 3.5–5)
RBC # BLD: 3.6 E12/L (ref 3.5–5.5)
SODIUM BLD-SCNC: 140 MMOL/L (ref 132–146)
WBC # BLD: 4.3 E9/L (ref 4.5–11.5)

## 2022-06-21 PROCEDURE — 74177 CT ABD & PELVIS W/CONTRAST: CPT

## 2022-06-21 PROCEDURE — 80048 BASIC METABOLIC PNL TOTAL CA: CPT

## 2022-06-21 PROCEDURE — 94668 MNPJ CHEST WALL SBSQ: CPT

## 2022-06-21 PROCEDURE — 83735 ASSAY OF MAGNESIUM: CPT

## 2022-06-21 PROCEDURE — 97530 THERAPEUTIC ACTIVITIES: CPT

## 2022-06-21 PROCEDURE — 6370000000 HC RX 637 (ALT 250 FOR IP): Performed by: FAMILY MEDICINE

## 2022-06-21 PROCEDURE — 6360000002 HC RX W HCPCS: Performed by: FAMILY MEDICINE

## 2022-06-21 PROCEDURE — 85025 COMPLETE CBC W/AUTO DIFF WBC: CPT

## 2022-06-21 PROCEDURE — 2580000003 HC RX 258: Performed by: FAMILY MEDICINE

## 2022-06-21 PROCEDURE — 97165 OT EVAL LOW COMPLEX 30 MIN: CPT

## 2022-06-21 PROCEDURE — 6360000004 HC RX CONTRAST MEDICATION: Performed by: RADIOLOGY

## 2022-06-21 PROCEDURE — 6370000000 HC RX 637 (ALT 250 FOR IP): Performed by: SPECIALIST

## 2022-06-21 PROCEDURE — 92526 ORAL FUNCTION THERAPY: CPT | Performed by: SPEECH-LANGUAGE PATHOLOGIST

## 2022-06-21 PROCEDURE — 82962 GLUCOSE BLOOD TEST: CPT

## 2022-06-21 PROCEDURE — 2060000000 HC ICU INTERMEDIATE R&B

## 2022-06-21 PROCEDURE — 84100 ASSAY OF PHOSPHORUS: CPT

## 2022-06-21 PROCEDURE — 90935 HEMODIALYSIS ONE EVALUATION: CPT | Performed by: INTERNAL MEDICINE

## 2022-06-21 PROCEDURE — 99232 SBSQ HOSP IP/OBS MODERATE 35: CPT | Performed by: FAMILY MEDICINE

## 2022-06-21 PROCEDURE — 36415 COLL VENOUS BLD VENIPUNCTURE: CPT

## 2022-06-21 PROCEDURE — 94640 AIRWAY INHALATION TREATMENT: CPT

## 2022-06-21 RX ORDER — MORPHINE SULFATE 2 MG/ML
2 INJECTION, SOLUTION INTRAMUSCULAR; INTRAVENOUS EVERY 4 HOURS PRN
Status: DISCONTINUED | OUTPATIENT
Start: 2022-06-21 | End: 2022-06-21

## 2022-06-21 RX ORDER — MORPHINE SULFATE 2 MG/ML
1 INJECTION, SOLUTION INTRAMUSCULAR; INTRAVENOUS EVERY 4 HOURS PRN
Status: DISCONTINUED | OUTPATIENT
Start: 2022-06-21 | End: 2022-06-21

## 2022-06-21 RX ORDER — MORPHINE SULFATE 2 MG/ML
1 INJECTION, SOLUTION INTRAMUSCULAR; INTRAVENOUS ONCE
Status: COMPLETED | OUTPATIENT
Start: 2022-06-21 | End: 2022-06-21

## 2022-06-21 RX ORDER — HYDROCODONE BITARTRATE AND ACETAMINOPHEN 5; 325 MG/1; MG/1
0.5 TABLET ORAL EVERY 6 HOURS PRN
Status: DISCONTINUED | OUTPATIENT
Start: 2022-06-21 | End: 2022-06-22

## 2022-06-21 RX ADMIN — TIMOLOL MALEATE 1 DROP: 5 SOLUTION OPHTHALMIC at 20:26

## 2022-06-21 RX ADMIN — SODIUM CHLORIDE, PRESERVATIVE FREE 10 ML: 5 INJECTION INTRAVENOUS at 12:53

## 2022-06-21 RX ADMIN — HEPARIN SODIUM 5000 UNITS: 10000 INJECTION INTRAVENOUS; SUBCUTANEOUS at 07:43

## 2022-06-21 RX ADMIN — ASPIRIN 81 MG CHEWABLE TABLET 81 MG: 81 TABLET CHEWABLE at 09:15

## 2022-06-21 RX ADMIN — BUMETANIDE 2 MG: 1 TABLET ORAL at 09:15

## 2022-06-21 RX ADMIN — LATANOPROST 1 DROP: 50 SOLUTION OPHTHALMIC at 20:26

## 2022-06-21 RX ADMIN — PANTOPRAZOLE SODIUM 20 MG: 20 TABLET, DELAYED RELEASE ORAL at 06:01

## 2022-06-21 RX ADMIN — DOXYCYCLINE HYCLATE 100 MG: 100 CAPSULE ORAL at 20:24

## 2022-06-21 RX ADMIN — HEPARIN SODIUM 5000 UNITS: 10000 INJECTION INTRAVENOUS; SUBCUTANEOUS at 14:09

## 2022-06-21 RX ADMIN — ACETAMINOPHEN 650 MG: 325 TABLET ORAL at 03:12

## 2022-06-21 RX ADMIN — IPRATROPIUM BROMIDE AND ALBUTEROL SULFATE 1 AMPULE: .5; 2.5 SOLUTION RESPIRATORY (INHALATION) at 19:57

## 2022-06-21 RX ADMIN — TIMOLOL MALEATE 1 DROP: 5 SOLUTION OPHTHALMIC at 07:43

## 2022-06-21 RX ADMIN — SODIUM CHLORIDE, PRESERVATIVE FREE 10 ML: 5 INJECTION INTRAVENOUS at 12:33

## 2022-06-21 RX ADMIN — IPRATROPIUM BROMIDE AND ALBUTEROL SULFATE 1 AMPULE: .5; 2.5 SOLUTION RESPIRATORY (INHALATION) at 09:23

## 2022-06-21 RX ADMIN — HYDROCODONE BITARTRATE AND ACETAMINOPHEN 0.5 TABLET: 5; 325 TABLET ORAL at 20:24

## 2022-06-21 RX ADMIN — ISOSORBIDE MONONITRATE 30 MG: 30 TABLET, EXTENDED RELEASE ORAL at 09:15

## 2022-06-21 RX ADMIN — METOPROLOL SUCCINATE 25 MG: 25 TABLET, FILM COATED, EXTENDED RELEASE ORAL at 09:15

## 2022-06-21 RX ADMIN — BRIMONIDINE TARTRATE 1 DROP: 2 SOLUTION/ DROPS OPHTHALMIC at 20:25

## 2022-06-21 RX ADMIN — MORPHINE SULFATE 2 MG: 2 INJECTION, SOLUTION INTRAMUSCULAR; INTRAVENOUS at 12:53

## 2022-06-21 RX ADMIN — TICAGRELOR 90 MG: 90 TABLET ORAL at 20:24

## 2022-06-21 RX ADMIN — PIPERACILLIN AND TAZOBACTAM 4500 MG: 4; .5 INJECTION, POWDER, LYOPHILIZED, FOR SOLUTION INTRAVENOUS at 09:24

## 2022-06-21 RX ADMIN — IPRATROPIUM BROMIDE AND ALBUTEROL SULFATE 1 AMPULE: .5; 2.5 SOLUTION RESPIRATORY (INHALATION) at 17:22

## 2022-06-21 RX ADMIN — MORPHINE SULFATE 1 MG: 2 INJECTION, SOLUTION INTRAMUSCULAR; INTRAVENOUS at 07:42

## 2022-06-21 RX ADMIN — IOPAMIDOL 50 ML: 755 INJECTION, SOLUTION INTRAVENOUS at 12:35

## 2022-06-21 RX ADMIN — SODIUM CHLORIDE, PRESERVATIVE FREE 10 ML: 5 INJECTION INTRAVENOUS at 07:43

## 2022-06-21 RX ADMIN — TICAGRELOR 90 MG: 90 TABLET ORAL at 09:15

## 2022-06-21 RX ADMIN — BRIMONIDINE TARTRATE 1 DROP: 2 SOLUTION/ DROPS OPHTHALMIC at 07:43

## 2022-06-21 RX ADMIN — DOXYCYCLINE HYCLATE 100 MG: 100 CAPSULE ORAL at 09:15

## 2022-06-21 RX ADMIN — ACETAMINOPHEN 650 MG: 325 TABLET ORAL at 18:32

## 2022-06-21 ASSESSMENT — PAIN DESCRIPTION - LOCATION
LOCATION: BACK
LOCATION: BACK;ABDOMEN
LOCATION: BACK
LOCATION: ABDOMEN;BACK
LOCATION: ABDOMEN

## 2022-06-21 ASSESSMENT — PAIN SCALES - GENERAL
PAINLEVEL_OUTOF10: 3
PAINLEVEL_OUTOF10: 9
PAINLEVEL_OUTOF10: 9
PAINLEVEL_OUTOF10: 6
PAINLEVEL_OUTOF10: 9
PAINLEVEL_OUTOF10: 7
PAINLEVEL_OUTOF10: 10
PAINLEVEL_OUTOF10: 9
PAINLEVEL_OUTOF10: 9

## 2022-06-21 ASSESSMENT — PAIN DESCRIPTION - DESCRIPTORS
DESCRIPTORS: SHARP
DESCRIPTORS: ACHING
DESCRIPTORS: SHARP
DESCRIPTORS: SHARP

## 2022-06-21 ASSESSMENT — PAIN DESCRIPTION - PAIN TYPE
TYPE: ACUTE PAIN
TYPE: ACUTE PAIN
TYPE: CHRONIC PAIN

## 2022-06-21 ASSESSMENT — PAIN DESCRIPTION - ONSET
ONSET: ON-GOING

## 2022-06-21 ASSESSMENT — PAIN DESCRIPTION - ORIENTATION
ORIENTATION: RIGHT
ORIENTATION: RIGHT
ORIENTATION: MID;RIGHT;LEFT;LOWER
ORIENTATION: RIGHT
ORIENTATION: RIGHT

## 2022-06-21 ASSESSMENT — PAIN DESCRIPTION - FREQUENCY
FREQUENCY: CONTINUOUS
FREQUENCY: CONTINUOUS
FREQUENCY: INTERMITTENT
FREQUENCY: CONTINUOUS

## 2022-06-21 NOTE — PROGRESS NOTES
P Quality Flow/Interdisciplinary Rounds Progress Note        Quality Flow Rounds held on June 21, 2022    Disciplines Attending:  Bedside Nurse,  and     Katerina Zepeda was admitted on 6/15/2022  6:13 PM    Anticipated Discharge Date:       Disposition:    Minor Score:  Minor Scale Score: 19    Readmission Risk              Risk of Unplanned Readmission:  42           Discussed patient goal for the day, patient clinical progression, and barriers to discharge. The following Goal(s) of the Day/Commitment(s) have been identified:  CT Abd, IV Zosyn.       Romina Mera RN  June 21, 2022

## 2022-06-21 NOTE — PROGRESS NOTES
Exposed port housing visible when dressing removed to change needle. Area cleaned well with CHG and covered with DSD. Pt has no access d/t R mastectomy and L arm HD access. Floor nurse aware.

## 2022-06-21 NOTE — PROGRESS NOTES
[address] [place] [address] [facility]  [facility]  Progress Note    SUBJECTIVE:  Chief Complaint   Patient presents with    Shortness of Breath     for a few days per pt    Altered Mental Status     per NH staff, pt able to answer all orientation questions     Patient is tolerating medications. No reported adverse drug reactions. No nausea, vomiting, diarrhea. --stools are loose   Says her breathing is a bit better today, still with an occasional minimally productive cough with greyish sputum per patient  No fevers  on room air     Review of systems:  As stated above in the chief complaint, otherwise negative.     Medications:  Scheduled Meds:   alteplase  2 mg IntraCATHeter Once    ipratropium-albuterol  1 ampule Inhalation 4x daily    doxycycline hyclate  100 mg Oral 2 times per day    bumetanide  2 mg Oral Once per day on  Sat    isosorbide mononitrate  30 mg Oral Daily    latanoprost  1 drop Right Eye Nightly    metoprolol succinate  25 mg Oral Daily    pantoprazole  20 mg Oral QAM AC    ticagrelor  90 mg Oral BID    sodium chloride flush  5-40 mL IntraVENous 2 times per day    [Held by provider] lanthanum  1,000 mg Oral TID WC    heparin (porcine)  5,000 Units SubCUTAneous TID    piperacillin-tazobactam  4,500 mg IntraVENous Q12H    brimonidine  1 drop Right Eye BID    timolol  1 drop Right Eye BID    aspirin  81 mg Oral Daily     Continuous Infusions:   sodium chloride 25 mL (22)    dextrose       PRN Meds:morphine **OR** morphine, guaiFENesin, midodrine, sodium chloride flush, sodium chloride, ondansetron **OR** ondansetron, polyethylene glycol, acetaminophen **OR** acetaminophen, glucose, dextrose bolus **OR** dextrose bolus, glucagon (rDNA), dextrose    OBJECTIVE:  BP (!) 127/44   Pulse 74   Temp 98.8 °F (37.1 °C) (Oral)   Resp 16   Ht 5' 10\" (1.778 m)   Wt 151 lb 12.8 oz (68.9 kg)   SpO2 99%   BMI 21.78 kg/m²   Temp  Av.5 °F (36.9 °C)  Min: 98.2 °F (36.8 °C)  Max: 98.8 °F (37.1 °C)  Constitutional: The patient is awake, alert, and oriented. Laying in bed on left side   Skin: Warm and dry. No rashes were noted. HEENT: Round and reactive pupils. Moist mucous membranes. No ulcerations or thrush. Neck: Supple to movements. Chest: No use of accessory muscles to breathe. Symmetrical expansion. No wheezing, crackles. R mastectomy. Clear throughout. Cardiovascular: S1 and S2 are rhythmic and regular. No murmurs appreciated. Abdomen: Positive bowel sounds to auscultation. Benign to palpation. No masses felt. Extremities lymphedema  postmastectomy on the right.  Left arm AV fistula   Lines: peripheral   R chest [facility]      Laboratory and Tests Review:  Lab Results   Component Value Date    WBC 5.2 06/20/2022    WBC 4.4 (L) 06/19/2022    WBC 4.7 06/18/2022    HGB 9.5 (L) 06/20/2022    HCT 30.4 (L) 06/20/2022    MCV 93.5 06/20/2022     06/20/2022     Lab Results   Component Value Date    NEUTROABS 3.04 06/20/2022    NEUTROABS 2.35 06/19/2022    NEUTROABS 3.01 06/18/2022     Lab Results   Component Value Date    CRPHS 0.7 08/16/2016    CRPHS 2.6 04/05/2016    CRPHS 7.6 (H) 01/28/2015     Lab Results   Component Value Date    ALT 16 06/15/2022    AST 19 06/15/2022    ALKPHOS 155 (H) 06/15/2022    BILITOT 0.7 06/15/2022     Lab Results   Component Value Date     06/20/2022    K 3.9 06/20/2022     06/20/2022    CO2 25 06/20/2022    BUN 29 06/20/2022    CREATININE 5.2 06/20/2022    CREATININE 4.1 06/19/2022    CREATININE 2.8 06/18/2022    GFRAA 10 06/20/2022    LABGLOM 10 06/20/2022    GLUCOSE 115 06/20/2022    GLUCOSE 46 04/02/2012    PROT 6.7 06/15/2022    LABALBU 3.5 06/15/2022    LABALBU 3.8 04/02/2012    CALCIUM 9.2 06/20/2022    BILITOT 0.7 06/15/2022    ALKPHOS 155 06/15/2022    AST 19 06/15/2022    ALT 16 06/15/2022     Lab Results   Component Value Date    CRP 7.7 (H) 04/13/2022    CRP 0.7 (H) 01/20/2022    CRP 1.6 (H) 12/20/2021     Lab

## 2022-06-21 NOTE — PROGRESS NOTES
Occupational Therapy    OCCUPATIONAL THERAPY INITIAL EVALUATION     Kaylin Preston [place], [place]         Date:2022                                                  Patient Name: Shin Hernández    MRN: [id number]    : 1955    Room: [address] [place] [address]      Evaluating OT: Pearl Miller OTR/L   [id number]      Referring Lianna bArams MD    Specific Provider Orders/Date:OT eval and treat 2022      Diagnosis:  AMS (altered mental status) [R41.82]     Pertinent Medical History: CHF, diabetic retinopathy, dialysis, R great to amputation,vitreours hemorrhage L eye       Precautions:  Fall Risk, alarm      Assessment of current deficits    [x] Functional mobility  [x]ADLs  [x] Strength               []Cognition    [x] Functional transfers   [x] IADLs         [x] Safety Awareness   [x]Endurance    [] Fine Coordination              [x] Balance      [] Vision/perception   []Sensation     []Gross Motor Coordination  [] ROM  [] Delirium                   [] Motor Control     OT PLAN OF CARE   OT POC based on physician orders, patient diagnosis and results of clinical assessment    Frequency/Duration  2-4 days/wk for 2 weeks PRN   Specific OT Treatment Interventions to include:   ADL retraining/adapted techniques and AE recommendations to increase functional independence within precautions                    Energy conservation techniques to improve tolerance for selfcare routine   Functional transfer/mobility training/DME recommendations for increased independence, safety and fall prevention         Patient/family education to increase safety and functional independence             Environmental modifications for safe mobility and completion of ADLs                             Therapeutic activity to improve functional performance during ADLs.                                          Therapeutic exercise to improve tolerance and functional strength for ADLs    Balance retraining/tolerance tasks for facilitation of postural control with dynamic challenges during ADLs . Positioning to improve functional independence  []      Recommended Adaptive Equipment: TBD     SOCIAL;  Patient admitted from [place] [address].  Ambulating with walker   Prior - patient report she lived at home, was Independent and active    Pain Level: back pain ;   Cognition: A&O: pleasant, following commands and conversing    Memory:  Fair+   Sequencing:  Fair+   Problem solving:  Fair+   Judgement/safety:  Fair+     Functional Assessment:  AM-PAC Daily Activity Raw Score: 15/24   Initial Eval Status  Date: 6/21/22 Treatment Status  Date: STGs = LTGs  Time frame: 10-14 days   Feeding Independent      Grooming Min A,seated   Decrease standing tolerance   Supervision    UB Dressing MiN A  [facility] gown   Supervision    LB Dressing Mod A  Min A   Bathing Mod A   Min A   Toileting Patient incontinent   - she reports she is having frequent bowel moves when she just moves in bed     Patient assisted with hygiene,clean gown and linens   Min A    Bed Mobility  Min A  Supine <>sit   SBA- rolling  Supervision    Functional Transfers Patient sat EOB only - upon sitting  - patient c/o increase back pain   SBA    Functional Mobility NT   SBA with good tolerance    Balance Sitting:     Static:  SBA- EOB     Dynamic:Mod A   Standing: NT   Supervision - sitting   SSBA- standing    Activity Tolerance Pain limiting tolerance   Good  with ADL activity    Visual/  Perceptual Glasses: none by bedside                 Hand Dominance right   AROM (PROM) Strength Additional Info:    RICARDO  R shoulder impaired - patient reports its bone on bone - uses L arm to assist lifting it   Distally WFLs  Swelling noted   good  and wfl FMC/dexterity noted during ADL tasks       DALJITE [place] WFL good  and wfl FMC/dexterity noted during ADL tasks       Hearing: [facility]   Sensation:  No c/o numbness or tingling   Tone: WFL  Edema: R UE     Comments: Upon arrival patient lying in bed . At end of session, patient returned to bed  with call light and phone within reach, all lines and tubes intact. *ALARM ON     Overall patient demonstrated  decreased independence and safety during completion of ADL/functional transfer/mobility tasks. Pt would benefit from continued skilled OT to increase safety and independence with completion of ADL/IADL tasks for functional independence and quality of life. Comments/Treatment:     Patient practiced and was instructed on following during evaluation and OT session:          -Bed mobility - technique and safety         -Tranfers - hand placement, tech and safety (unable this session )         -ADLs - tech, AE if appropriate/needed           -Education: , importance of OOB activity and participating in ADLs,             Rehab Potential: good for established goals     Patient / Family Goal: none stated       Patient and/or family were instructed on functional diagnosis, prognosis/goals and OT plan of care. Demonstrated good  understanding. Eval Complexity: Low    Time In: 1010  Time Out: 1038  Total Treatment Time: 12    Min Units   OT Eval Low 97165 x  1   OT Eval Medium 40836      OT Eval High 62839      OT Re-Eval [id number]       Therapeutic Ex 10199      Therapeutic Activities 64594  12  1   ADL/Self Care 27193       Orthotic Management 33376       Manual 80712     Neuro Re-Ed 38766       Non-Billable Time          Evaluation Time additionally includes thorough review of current medical information, gathering information on past medical history/social history and prior level of function, interpretation of standardized testing/informal observation of tasks, assessment of data and development of plan of care and goals.             Damir Teran  OTR/L  OT [id number]

## 2022-06-21 NOTE — PROGRESS NOTES
SPEECH LANGUAGE PATHOLOGY  DAILY PROGRESS NOTE        PATIENT NAME:  Ofelia Osman      :  1955          TODAY'S DATE:  2022 ROOM:  [address] [place], [place] [address]        SWALLOWING    Pt in bed receiving dialysis. Reports toleration of current diet with use of compensatory strategies. No oral trials due to pt currently NPO. Dysphagia exercise program completed with good ability. Pt completed TBR and laryngeal exercises with min assist. Encouraged to self complete as able. DYSPHAGIA DIAGNOSIS:  mild-moderate pharyngeal phase dysphagia-aspiration was not observed during study. However laryngeal penetration after the swallow was observed with consecutive sips of thin liquid per straw. Pt reports she has been eating/drinking lying down in bed due to back pain. This may increase her risk of aspiration.      Pt noted to have anterior bridging osteophytes possibly affecting pharyngeal swallow and increasing risk of penetration/aspiraiton.      DIET RECOMMENDATIONS:  Regular consistency solids (IDDSI level 7) with small sips of thin liquids per cup (IDDSI level 0) while upright-NO STRAWS    MEDICATION ADMINISTRATION: Per patient choice/nursing judgement     FEEDING RECOMMENDATIONS:               Assistance level:  No assistance needed                 Compensatory strategies recommended: Double swallow, Small bites/sips, Alternate solids and liquids and No straw        Education- Pt educated on results and POC. Pt trained on compensatory strategies for safe swallow with good outcome. Questions answered. Will continue SP intervention as per previously established POC. Joel BROWNE CCC/SLP [id number]  Speech Pathologist              CPT code(s) 63319  dysphagia tx  Total minutes :  30 minutes

## 2022-06-21 NOTE — PROGRESS NOTES
Spoke to [address] [place] [address] RN, who states are able to have leg or foot PIV, however, physician has to insert it. Dr. Elizabeth Space updated via telephone re: IV access and he must place the PIV. States he will talk to his attending and update us.

## 2022-06-21 NOTE — PROGRESS NOTES
Received return phone call from [address] [place] [address]. Per [facility] he spoke to Dr. Xiomara Hensley and to leave patient with no access for [facility].

## 2022-06-21 NOTE — PROGRESS NOTES
Physical Therapy  Facility/Department: [address] INTERMEDIATE  Physical Therapy Treatment Note    Name: Elmer Johnson  : 1955  MRN: [id number]  Date of Service: 2022    Patient Diagnosis(es): The encounter diagnosis was Acute respiratory failure with hypoxia ([address] 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 Past Surgical History:  has a past surgical history that includes Cholecystectomy; amputation; Mastectomy; Cystoscopy; Cataract removal; Ankle surgery; other surgical history (2011); ECHO Compl W Dop Color Flow (2013); Echo Complete (2013); spinal cord decompression; other surgical history (insertion lumbar drain insertion); other surgical history (10/22/2015); other surgical history (2015); Tunneled venous catheter placement (11/15/2017); Dialysis fistula creation (Left, 2018); vitrectomy (Left, 04/10/2018); pr rpr retinal dtchmnt w/vitrectomy any meth (Left, 04/10/2018); pr av anast,up arm basilic vein transposit (Left, 05/15/2018); pr ligatn angioaccess av fistula (Left, 2018); Colonoscopy; Carpal tunnel release (Left, 2020); transesophageal echocardiogram (2021);  Cardiac catheterization Comments: Nurse ok with rx. Pt found in bed, agreed to rx. Pt assisted to EOB, sat EOB SBA. Pt stood using [facility] for support approx 2 minutes with Min A for balance while receiving hygiene care. Pt then rested briefly sitting EOB. Pivot then performed bed to chair. Pt very unsteady, required strong hands on assist, states unable to walk and distance today. Treatment: Pt practiced and was instructed in the following treatment: transfer safety    Pt was left in a bedside chair with call light in reach. Pt sitting with trunk flexed forward, was unable to comfortably sit upright. Chair/bed alarm: chair alarm active    Time in 0900   Time out 0914   Total Treatment Time 14 minutes   CPT codes:     Therapeutic activities 13532 14 minutes   Therapeutic exercises 81591 0 minutes       Pt is making no progress toward established Physical Therapy goals. Continue with physical therapy current plan of care.     Kalyan Martines PTA   License Number: [id number]

## 2022-06-21 NOTE — PROGRESS NOTES
RE: CT contrast via port, cannot find documentation of power status. Found CXR 6/15/22 shows port with CT on housing. Currently accessed with power needle and blood return present and flushes easily. CT notified ok to use.

## 2022-06-21 NOTE — PROGRESS NOTES
[address]  Progress Note    Chief complaint :  Chief Complaint   Patient presents with    Shortness of Breath     for a few days per pt    Altered Mental Status     per NH staff, pt able to answer all orientation questions       Subjective:  Patient appears to be in significant pain, patient not appropriately dressed in robe. Patient describes having pain coming from her back radiating towards her abdomen and her leg. She states she continues to have loose stools. Patient denies having any shortness of breath or cough. Patient denies any nausea, vomiting, fever, chills. Past medical, surgical, family and social history were reviewed, non-contributory, and unchanged unless otherwise stated. Objective:  BP (!) 148/65   Pulse 80   Temp 99 °F (37.2 °C) (Oral)   Resp 18   Ht 5' 10\" (1.778 m)   Wt 146 lb 2.6 oz (66.3 kg)   SpO2 99%   BMI 20.97 kg/m²   Vital signs reviewed     Physical Exam  Vitals and nursing note reviewed. Constitutional:       Comments: Curled in a ball on one edge of the bed   HENT:      Head: Normocephalic and atraumatic. Nose: Nose normal. No congestion or rhinorrhea. Mouth/Throat:      Mouth: Mucous membranes are moist.      Pharynx: Oropharynx is clear. Eyes:      General: No scleral icterus. Conjunctiva/sclera: Conjunctivae normal.   Neck:      Vascular: No carotid bruit. Cardiovascular:      Rate and Rhythm: Normal rate and regular rhythm. Heart sounds: Murmur (2/6 systolic ) heard. No gallop. Pulmonary:      Effort: Pulmonary effort is normal.      Breath sounds: Normal breath sounds. No wheezing or rales. Comments:     Chest:   Breasts:      Right: Absent. Comments: Surgical scar closed and intact  Abdominal:      General: Bowel sounds are normal. There is no distension. Palpations: Abdomen is soft. There is no mass. Tenderness:  There is abdominal tenderness (diffuse , worse on lower abdomen ). There is no guarding or rebound. Musculoskeletal:         General: Tenderness (Rt lower back where the pain pump located) present. Normal range of motion. Cervical back: Normal range of motion and neck supple. Right lower leg: No edema. Left lower leg: No edema. Comments:  Middle distal phlanx amputed. Right lymphedema    Skin:     Coloration: Skin is not jaundiced. Comments: Port on right chest with plastic over and dressing    Multiple surgical scars healed scabs located throughout patient's back. Neurological:      General: No focal deficit present. Mental Status: She is alert and oriented to person, place, and time. Psychiatric:         Mood and Affect: Mood normal.         Behavior: Behavior normal.         Thought Content:  Thought content normal.         Judgment: Judgment normal.       Patient no longer has IV access line  Hemodialysis access on the left arm    Labs:  Labs Reviewed    Recent Results (from the past 24 hour(s))   POCT Glucose    Collection Time: 06/21/22 10:44 AM   Result Value Ref Range    Meter Glucose 106 (H) 74 - 99 mg/dL   Basic Metabolic Panel w/ Reflex to MG    Collection Time: 06/21/22 12:55 PM   Result Value Ref Range    Sodium 140 132 - 146 mmol/L    Potassium reflex Magnesium 3.8 3.5 - 5.0 mmol/L    Chloride 101 98 - 107 mmol/L    CO2 25 22 - 29 mmol/L    Anion Gap 14 7 - 16 mmol/L    Glucose 101 (H) 74 - 99 mg/dL    BUN 18 6 - 23 mg/dL    CREATININE 3.9 (H) 0.5 - 1.0 mg/dL    GFR Non-African American 14 >=60 mL/min/1.73    GFR African American 14     Calcium 9.3 8.6 - 10.2 mg/dL   CBC with Auto Differential    Collection Time: 06/21/22 12:55 PM   Result Value Ref Range    WBC 4.3 (L) 4.5 - 11.5 E9/L    RBC 3.60 3.50 - 5.50 E12/L    Hemoglobin 10.2 (L) 11.5 - 15.5 g/dL    Hematocrit 33.0 (L) 34.0 - 48.0 %    MCV 91.7 80.0 - 99.9 fL    MCH 28.3 26.0 - 35.0 pg    MCHC 30.9 (L) 32.0 - 34.5 %    RDW 18.2 (H) 11.5 - 15.0 fL    Platelets 222 130 - 450 E9/L    MPV 10.0 7.0 - 12.0 fL    Neutrophils % 59.8 43.0 - 80.0 %    Immature Granulocytes % 1.4 0.0 - 5.0 %    Lymphocytes % 21.7 20.0 - 42.0 %    Monocytes % 12.5 (H) 2.0 - 12.0 %    Eosinophils % 3.9 0.0 - 6.0 %    Basophils % 0.7 0.0 - 2.0 %    Neutrophils Absolute 2.59 1.80 - 7.30 E9/L    Immature Granulocytes # 0.06 E9/L    Lymphocytes Absolute 0.94 (L) 1.50 - 4.00 E9/L    Monocytes Absolute 0.54 0.10 - 0.95 E9/L    Eosinophils Absolute 0.17 0.05 - 0.50 E9/L    Basophils Absolute 0.03 0.00 - 0.20 E9/L   Phosphorus    Collection Time: 06/21/22 12:55 PM   Result Value Ref Range    Phosphorus 2.9 2.5 - 4.5 mg/dL   Magnesium    Collection Time: 06/21/22 12:55 PM   Result Value Ref Range    Magnesium 2.0 1.6 - 2.6 mg/dL   POCT Glucose    Collection Time: 06/21/22  6:18 PM   Result Value Ref Range    Meter Glucose 87 74 - 99 mg/dL   POCT Glucose    Collection Time: 06/21/22  8:23 PM   Result Value Ref Range    Meter Glucose 131 (H) 74 - 99 mg/dL   POCT Glucose    Collection Time: 06/22/22  5:34 AM   Result Value Ref Range    Meter Glucose 87 74 - 99 mg/dL       Radiology and other tests reviewed:  CT ABDOMEN PELVIS W IV CONTRAST Additional Contrast? None   Final Result   1. Pattern of generalized anasarca. 2.  No conspicuous localized inflammatory process in the mid mesentery fat   planes. No signs for acute diverticulitis. No bowel obstruction. No free   intraperitoneal air. The no ascites. 3.  Findings for inflammatory process/discitis in the levels of L1-L2 L3   which have been previously described. 4.  Previous spinal fusion of L3/L4/L5 which has been previously described. 5.  Edema with generalized anasarca correlate with volume overload and   chronic renal failure. The extensive vascular arterial calcifications   correlate also with chronic renal failure.       RECOMMENDATIONS:   Unavailable         XR ABDOMEN (KUB) (SINGLE AP VIEW)   Final Result   Colonic diverticulosis. Fluoroscopy modified barium swallow with video   Final Result   1. No barium aspiration or significant swallowing deficits. 2.  Transient laryngeal penetration with thin liquid barium. 3.  Cervical spine degenerative spondylosis. 4.  Please see separate speech pathology report for full discussion of   findings and recommendations. CT HEAD WO CONTRAST   Final Result   No acute intracranial abnormality. CT CHEST WO CONTRAST   Final Result   1. New ground-glass and airspace consolidations throughout right lung notable   in right lower lobe as well as minimal opacities in left lower lobe   suggesting interstitial pulmonary edema or bilateral pneumonia. 2. Cardiomegaly with pulmonary vascular congestion. 3. Multiple stable prominent mediastinal lymph nodes. XR CHEST PORTABLE   Final Result   No acute process.              Assessment:  Active Hospital Problems    Diagnosis Date Noted    Patient is Oriental orthodox [Z78.9] 11/07/2017     Priority: High     Class: Chronic    Acute respiratory failure with hypoxia (HCC) [J96.01]      Priority: Medium    Lower abdominal pain [R10.30]      Priority: Medium    Healthcare-associated pneumonia [J18.9] 06/16/2022     Priority: Medium    Hypoxia [R09.02] 06/16/2022     Priority: Medium    Ischemic cardiomyopathy [I25.5]      Priority: Medium    AMS (altered mental status) [R41.82] 06/15/2022     Priority: Medium    Elevated troponin [R77.8] 02/17/2017     Priority: Medium    CAD in native artery [I25.10] 12/09/2021    Mitral valve disease [I05.9] 11/11/2021    Lymphedema of right upper extremity [I89.0]     ESRD (end stage renal disease) on dialysis ([facility] [N18.6, Z99.2]     Diabetic peripheral neuropathy ([facility] [E11.42] 08/30/2018    Controlled type 2 diabetes mellitus with chronic kidney disease on chronic dialysis, with long-term current use of insulin ([address] [E11.22, N18.6, Z79.4, Z99.2] 02/22/2017  Glaucoma [H40.9]        Plan:  Bilateral lower lobe Health-care associated pneumonia- improving   Patient on room air   Zosyn 4.5 g Q12H, (day 7)   Duonebs Q4H PRN  Guaifenesin 400mg QID PRN  Chest therapy, incentive spirometer,   CBC, BMP daily  ID following appreciate input    Colonic diverticulosis  New onset abdominal pain, concerns for diverticulitis  KUB showed colonic diverticulosis, dilute contrast and lower GI tract no free air or bowel wall pneumonitis or dilated bowel. CT contrast abdomen and pelvis ordered showed no acute diverticulitis, and anascara generalized, small hiatal hernia  Pain control-Viola 0.5 p.o. every 6 hours as needed for moderate and severe pain  Continue to monitor    Vertebral osteomyelitis  Stable  Completed 6 weeks of IV vancomycin 750 mg IV antibiotic after dialysis MWF  Doxycyline 100 mg Q12H for suppression of osteomyelitis. -Will continue when discharged  ID following appreciate input    Mild to moderate pharyngeal phase dysphagia  Fluoroscopic modified barium swallow study showed no barium aspiration no significant swallowing deficits, transient laryngeal penetration is within thin liquid barium, cervical spine degenerative spondylitis  Avoid thin liquids with a straw    Exposed PowerPort-was still being used for blood draws. Exposed port housing visible, cleaned appropriately overnight. Blood cultures negative  ID following appreciate input    Chronic back pain  Patient has multiple fusions and a history of back pain . Patient has a tender , and working stimulator in the right lower back.   Pain control Norco 0.5 p.o. every 6 hours as needed  Lidocaine patch     End-stage renal disease hemodialysis MWF  Creatinine baseline 3,  Continue home Fosrenol 1 g TID- held  Continue Bumex 2mg  BMP, mag, Phos daily  Nephrology following appreciate input    CAD with NSTEMI 1 month ago  Continue home metoprolol 25 mg daily, Imdur 30 mg daily, ticagrelor 90mg BID     CHF  Last echo with EF of 40 to 45%, dilated LA.   Monitor for fluid overload  Daily weights  Strict ins and outs  Continue home metoprolol succinate 25 mg daily, Imdur 30 mg daily    Anemia of chronic disease   No blood to be given to patient  Daily CBC   hemoglobin today - morning labs pending     Insulin-dependent diabetes type 2  Last A1c 5.1  POCT glucose checks  Per glycemia protocol    History of hypertension  Currently soft blood pressures  Midodrine 5 mg daily as needed  Monitor blood pressures    Glaucoma right eye   bromonidine 0.2% ophthalmic solution 1 drop BID   Latanoprost 0.005% ophthalmic solution 1 drop  Nightly   Timolol 0.5% ophthalmic solution 1 drop BID       DVT ppx: Heparin 5000 units TID  GI ppx: none  Code Status: Full  Diet: Carb controlled -no straw use    Disposition: Discharge to pending clinical course  Rizwana Treadwell MD  Family Medicine [id number]

## 2022-06-21 NOTE — PROGRESS NOTES
[facility]  Nephrology Progress Note    Patient's Name: Ken Brannon      History of Present Illness-full consult deferred as pt followed longitudinally at dialysis:    \"Claudia Cunningham is a 78 y.o. female with a past medical history of breast cancer, coronary artery disease, hypertension, hyperlipidemia, and anemia. She presented to the [address] [facility] ED on 5/2 with complaints of back and abdominal pain patient was subsequently transferred to [facility] for NSTEMI. Patient is known to our service and dialyzes at [address] MWF 1st shift left AV fistula. In April she was Dx with DOMINICK zimmerman and is treated with iv Vanc  And has a mediport in the R  Pt is now admitted from the ED for cough and hypoxia. She came to the ED after having ongoing pain in the RLE when sitting, no significant pain when standing or using her walker. She notes since 6 days ago she had a cough productive of grey sputum. Resp Viral Panel (+) for SARS-CoV-2\"    6/21/22- Alert, resting in bed. Having RLQ abd pain. Is awaiting abd CT. PMH:    Past Medical History:   Diagnosis Date    Acute infection of bone ([address]     infection of rt foot, resolved.     Acute osteomyelitis of phalanx of left hand ([address] 1/27/2022    Left third distal phalanx    Anemia of chronic disease     Arthritis     Breast cancer ([address]     right breast, 2008/ bladder, 2006- last chemotherapy \"years ago\"    CAD (coronary artery disease)     Carpal tunnel syndrome     bilat - for OR left hand 3-17-20     Chronic diastolic CHF (congestive heart failure) ([address] 09/23/2014 9/23/14- echocardiogram revealed moderate LV concentric hypertrophy, stage III diastolic dysfunction, mild tricuspid regurgitation    CKD (chronic kidney disease) stage 4, GFR 15-29 ml/min ([facility])     Diabetic retinopathy ([address]     Glaucoma     Hemodialysis patient ([address]     [facility]  mon wed fri- Dr. Hay Belt - left arm fistula     Hyperkalemia, diminished renal excretion 11/9/2017    Hyperlipidemia     Hypertension     Hypoglycemia unawareness in type 1 diabetes mellitus ([address] 11/7/2017    Insulin dependent type 2 diabetes mellitus (HCC)     Neuropathy     feet    Osteomyelitis due to secondary diabetes ([address]     rt great toe with amputation    Patient is Scientologist 11/7/2017    Refusal of blood product     patient states she dose not take blood transfusion    Ventricular hypertrophy     Ventricular tachycardia ([address] 5/24/2021    Vitreous hemorrhage (HCC)     left eye     Patient Active Problem List   Diagnosis    Diabetic retinopathy ([address]    Malignant neoplasm of right female breast ([address]    Atherosclerosis of native coronary artery of native heart without angina pectoris    Moderate obesity    Left ventricular hypertrophy    Herniated lumbar intervertebral disc    Lumbar degenerative disc disease    Pseudomeningocele    Lumbar radiculopathy    Lymphedema of arm    Anemia of chronic disease    Chronic diastolic CHF (congestive heart failure) ([address]    Hypertension    Glaucoma    Refusal of blood product    Elevated troponin    Controlled type 2 diabetes mellitus with chronic kidney disease on chronic dialysis, with long-term current use of insulin (HCC)    Vitreous hemorrhage ([address]    Patient is Scientologist    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 ([address]    Chronic pain syndrome    Lumbar post-laminectomy syndrome    Cervicalgia    Diabetic peripheral neuropathy ([address]    ESRD (end stage renal disease) ([address]    Bilateral carpal tunnel syndrome    Cardiac arrest ([address]    ESRD (end stage renal disease) on dialysis ([address]    Mixed hyperlipidemia    Lymphedema of right upper extremity    Coronary artery disease involving native coronary artery of native heart with angina pectoris ([address]    Mitral valve disease    CAD in native artery    Lumbar stenosis without neurogenic claudication    Intractable low back pain    Unable to ambulate    NSTEMI (non-ST elevated myocardial infarction) (HCC)    Moderate protein-calorie malnutrition (HCC)    AMS (altered mental status)    Ischemic cardiomyopathy    Healthcare-associated pneumonia    Hypoxia    Acute respiratory failure with hypoxia (HCC)    Lower abdominal pain     Meds:     alteplase  2 mg IntraCATHeter Once    ipratropium-albuterol  1 ampule Inhalation 4x daily    doxycycline hyclate  100 mg Oral 2 times per day    bumetanide  2 mg Oral Once per day on Tue Thu Sat    isosorbide mononitrate  30 mg Oral Daily    latanoprost  1 drop Right Eye Nightly    metoprolol succinate  25 mg Oral Daily    pantoprazole  20 mg Oral QAM AC    ticagrelor  90 mg Oral BID    sodium chloride flush  5-40 mL IntraVENous 2 times per day    [Held by provider] lanthanum  1,000 mg Oral TID WC    heparin (porcine)  5,000 Units SubCUTAneous TID    piperacillin-tazobactam  4,500 mg IntraVENous Q12H    brimonidine  1 drop Right Eye BID    timolol  1 drop Right Eye BID    aspirin  81 mg Oral Daily      sodium chloride 25 mL (06/19/22 2144)    dextrose       Meds prn:     morphine **OR** morphine, guaiFENesin, midodrine, sodium chloride flush, sodium chloride, ondansetron **OR** ondansetron, polyethylene glycol, acetaminophen **OR** acetaminophen, glucose, dextrose bolus **OR** dextrose bolus, glucagon (rDNA), dextrose    Meds prior to admission:     No current facility-administered medications on file prior to encounter. Current Outpatient Medications on File Prior to Encounter   Medication Sig Dispense Refill    gabapentin (NEURONTIN) 100 MG capsule Take 2 capsules by mouth 3 times daily for 180 days.  180 capsule 5    atorvastatin (LIPITOR) 80 MG tablet Take 80 mg by mouth nightly      bumetanide (BUMEX) 2 MG tablet Take 2 mg by mouth three times a week Given Sunday,Tuesday,Thursday      isosorbide mononitrate (IMDUR) 30 MG extended release tablet Take 30 mg by mouth daily      insulin glargine (LANTUS) 100 UNIT/ML injection vial Inject 5 Units into the skin nightly       metoprolol succinate (TOPROL XL) 25 MG extended release tablet Take 1 tablet by mouth daily 90 tablet 1    lanthanum (FOSRENOL) 1000 MG chewable tablet Take 1,000 mg by mouth 3 times daily (with meals)       allopurinol (ZYLOPRIM) 100 MG tablet Take 1 tablet by mouth daily 90 tablet 1    ticagrelor (BRILINTA) 90 MG TABS tablet Take 1 tablet by mouth 2 times daily 180 tablet 1    B Complex-C-Folic Acid (BONI CAPS) 1 MG CAPS Take 1 mg by mouth nightly       omeprazole (PRILOSEC) 20 MG delayed release capsule Take 20 mg by mouth daily as needed       LUMIGAN 0.01 % SOLN ophthalmic drops Place 1 drop into the right eye nightly       COMBIGAN 0.2-0.5 % ophthalmic solution Place 1 drop into the right eye every 12 hours   7    aspirin 81 MG EC tablet Take 1 tablet by mouth daily 30 tablet 0    midodrine (PROAMATINE) 5 MG tablet Take 5 mg by mouth daily as needed      lidocaine-prilocaine (EMLA) 2.5-2.5 % cream Apply topically as needed for Pain        Allergies:    Furosemide    Social History:     reports that she has never smoked. She has never used smokeless tobacco. She reports that she does not drink alcohol and does not use drugs.     Family History:         Problem Relation Age of Onset    Breast Cancer Mother 61    Hypertension Mother     Heart Disease Father     Prostate Cancer Father     Breast Cancer Maternal Grandmother 61     Physical Exam:    Patient Vitals for the past 24 hrs:   BP Temp Temp src Pulse Resp SpO2 Weight   06/21/22 0921 -- -- -- 74 16 99 % --   06/21/22 0812 -- -- -- -- 18 -- --   06/21/22 0730 (!) 127/44 98.8 °F (37.1 °C) Oral 71 16 100 % --   06/21/22 0247 -- -- -- -- -- -- 151 lb 12.8 oz (68.9 kg)   06/20/22 2020 131/60 98.2 °F (36.8 °C) Oral 72 14 95 % --   06/20/22 1717 (!) 148/90 98.5 °F (36.9 °C) -- 67 16 -- 150 lb 9.2 oz (68.3 kg) 06/20/22 1700 (!) 182/76 -- -- 65 -- -- --   06/20/22 1600 94/61 -- -- 65 -- -- --   06/20/22 1530 (!) 163/67 -- -- 67 -- -- --   06/20/22 1500 (!) 162/45 -- -- 68 -- -- --   06/20/22 1430 (!) 160/50 -- -- 69 -- -- --   06/20/22 1406 (!) 179/53 -- -- 70 -- -- --   06/20/22 1345 (!) 162/55 -- -- 70 16 -- 154 lb 5.2 oz (70 kg)   06/20/22 1322 -- -- -- 79 16 93 % --       Intake/Output Summary (Last 24 hours) at 6/21/2022 1241  Last data filed at 6/20/2022 1717  Gross per 24 hour   Intake --   Output 2000 ml   Net -2000 ml     General: Awake, alert, no acute distress  Neck: No JVD noted  Lungs: diminished throughout, no adventitious sounds. Mediport in the R chest  CV: RRR, no rub  Abd: Soft, pain LRQ extending to right side, nondistended  Skin: Warm and dry. No rash on exposed extremities  Ext: 1+ RUE edema (h/o breast CA); No BLE edema ; left AV fistula in the upper ext, +thrill, +bruit  Neuro: Alert & oriented, answers questions appropriately    Data:    Recent Labs     06/19/22  0420 06/20/22  0419   WBC 4.4* 5.2   HGB 10.0* 9.5*   HCT 32.6* 30.4*   MCV 92.6 93.5    208     Recent Labs     06/19/22  0420 06/20/22  0419    141   K 4.0 3.9    101   CO2 27 25   CREATININE 4.1* 5.2*   BUN 20 29*   LABGLOM 13 10   GLUCOSE 102* 115*   CALCIUM 9.3 9.2   PHOS 2.7 3.1   MG 2.3 2.1     Vit D, 25-Hydroxy   Date Value Ref Range Status   03/11/2022 28 (L) 30 - 100 ng/mL Final     Comment:     <20 ng/mL. ........... Faith Bloodgood Deficient  20-30 ng/mL. ......... Faith Bloodgood Insufficient   ng/mL. ........ Faith Bloodgood Sufficient  >100 ng/mL. .......... Faith Bloodgood Toxic       PTH   Date Value Ref Range Status   03/11/2022 241 (H) 15 - 65 pg/mL Final     No results for input(s): ALT, AST, ALKPHOS, BILITOT, BILIDIR in the last 72 hours. No results for input(s): LABALBU in the last 72 hours.   Ferritin   Date Value Ref Range Status   06/15/2022 2,221 ng/mL Final     Comment:     FERRITIN Reference Ranges:  Adult Males   20 - 60 years:    27 - 400 ng/mL  Adult females 16 - 60 years:    15 - 150 ng/mL  Adults greater than 60 years:   no established reference range  Pediatrics:                     no established reference range       Iron   Date Value Ref Range Status   06/15/2022 31 (L) 37 - 145 mcg/dL Final     TIBC   Date Value Ref Range Status   06/15/2022 139 (L) 250 - 450 mcg/dL Final     Vitamin B-12   Date Value Ref Range Status   02/17/2017 1802 (H) 211 - 946 pg/mL Final     Folate   Date Value Ref Range Status   02/17/2017 >20.0 4.8 - 24.2 ng/mL Final     Lab Results   Component Value Date    COLORU Yellow 11/07/2017    NITRU Negative 11/07/2017    GLUCOSEU Negative 11/07/2017    GLUCOSEU NEGATIVE 08/27/2011    KETUA Negative 11/07/2017    UROBILINOGEN 0.2 11/07/2017    BILIRUBINUR Negative 11/07/2017    BILIRUBINUR NEGATIVE 08/27/2011     No results found for: ALVINO, CREURRAN, MACREATRATIO, OSMOU    No components found for: URIC    No results found for: LIPIDPAN    Assessment and Plans:    1. ESKD on HD  OP [facility] MWF 1st shift   left AV fistula  PLAN:  1. Continue HD MWF  2. Dialysis today after abd CT with IV contrast  3. Daily BMP and CBC ordered    2. Recent  NSTEMI  S/p cardiac catheterization with balloon angioplasty 5/5  Cardiology previously following    3. Back pain  S/p bone biopsy 4/18 for lumbar spine osteomyelitis  Had been on vancomycin for this  Now on zosyn and vibramycin   ID following    4. Hypertension  BP goal<140/90  BP at goal  PLAN:  1. Monitor on current regimen and with HD    5. Anemia in CKD   Hemoglobin target 10-12  Hemoglobin 10.2 today  PLAN:  1. Continue JAYSHREE as hemoglobin below target  2. Transfuse for hemoglobin<7  3. Monitor H&H    6.  Secondary hyperparathyroidism of renal origin  PTH noted in the outpatient setting on 4/25/2022 was 443  Phosphorus 2.1-->2.7-->3.1-->2.9  PLAN:  1. Continue to Hold Fosrenol and recheck PO4 in the am  2. Monitor labs    7.  Asp Pne/HCAP with recent COVID 19 Pne        Lynda Render [id number] [facility]   Patient seen and examined. I had a face to face encounter with the patient while she was on hemodialysis earlier today. Tolerating the treatment at the time of my visit  Agree with exam.    Agree with  formulation, assessment and plan as outlined above and directed by me. Addition and corrections were done as deemed appropriate. My exam and plan include:     A/P:  1.  ESKD-additional treatment today due to the CT Scan of the Abd with contrast-will plan for usual treatment in the AM with vol removal given the CT Scan results of anasarca  Continue current treatment as outlined above    TRES Flores MD

## 2022-06-21 NOTE — PROGRESS NOTES
Nakul Laguerre with family med notified via perfect serve of exposed mediport and patient now having no IV access.

## 2022-06-22 ENCOUNTER — PREP FOR PROCEDURE (OUTPATIENT)
Dept: SURGERY | Age: 67
End: 2022-06-22

## 2022-06-22 PROBLEM — R52 PAIN: Status: ACTIVE | Noted: 2022-06-15

## 2022-06-22 LAB
ANION GAP SERPL CALCULATED.3IONS-SCNC: 14 MMOL/L (ref 7–16)
BASOPHILS ABSOLUTE: 0.05 E9/L (ref 0–0.2)
BASOPHILS RELATIVE PERCENT: 1 % (ref 0–2)
BUN BLDV-MCNC: 15 MG/DL (ref 6–23)
CALCIUM SERPL-MCNC: 9.8 MG/DL (ref 8.6–10.2)
CHLORIDE BLD-SCNC: 99 MMOL/L (ref 98–107)
CO2: 25 MMOL/L (ref 22–29)
CREAT SERPL-MCNC: 3.2 MG/DL (ref 0.5–1)
EOSINOPHILS ABSOLUTE: 0.14 E9/L (ref 0.05–0.5)
EOSINOPHILS RELATIVE PERCENT: 2.8 % (ref 0–6)
GFR AFRICAN AMERICAN: 18
GFR NON-AFRICAN AMERICAN: 18 ML/MIN/1.73
GLUCOSE BLD-MCNC: 116 MG/DL (ref 74–99)
HCT VFR BLD CALC: 34.4 % (ref 34–48)
HEMOGLOBIN: 10.9 G/DL (ref 11.5–15.5)
IMMATURE GRANULOCYTES #: 0.04 E9/L
IMMATURE GRANULOCYTES %: 0.8 % (ref 0–5)
LYMPHOCYTES ABSOLUTE: 1.1 E9/L (ref 1.5–4)
LYMPHOCYTES RELATIVE PERCENT: 21.9 % (ref 20–42)
MAGNESIUM: 2 MG/DL (ref 1.6–2.6)
MCH RBC QN AUTO: 29.2 PG (ref 26–35)
MCHC RBC AUTO-ENTMCNC: 31.7 % (ref 32–34.5)
MCV RBC AUTO: 92.2 FL (ref 80–99.9)
METER GLUCOSE: 121 MG/DL (ref 74–99)
METER GLUCOSE: 87 MG/DL (ref 74–99)
MONOCYTES ABSOLUTE: 0.72 E9/L (ref 0.1–0.95)
MONOCYTES RELATIVE PERCENT: 14.3 % (ref 2–12)
NEUTROPHILS ABSOLUTE: 2.97 E9/L (ref 1.8–7.3)
NEUTROPHILS RELATIVE PERCENT: 59.2 % (ref 43–80)
PDW BLD-RTO: 18.5 FL (ref 11.5–15)
PHOSPHORUS: 3 MG/DL (ref 2.5–4.5)
PLATELET # BLD: 220 E9/L (ref 130–450)
PMV BLD AUTO: 10.5 FL (ref 7–12)
POTASSIUM REFLEX MAGNESIUM: 3.6 MMOL/L (ref 3.5–5)
POTASSIUM SERPL-SCNC: 3.6 MMOL/L (ref 3.5–5)
RBC # BLD: 3.73 E12/L (ref 3.5–5.5)
SODIUM BLD-SCNC: 138 MMOL/L (ref 132–146)
WBC # BLD: 5 E9/L (ref 4.5–11.5)

## 2022-06-22 PROCEDURE — 6360000002 HC RX W HCPCS: Performed by: FAMILY MEDICINE

## 2022-06-22 PROCEDURE — 99232 SBSQ HOSP IP/OBS MODERATE 35: CPT | Performed by: FAMILY MEDICINE

## 2022-06-22 PROCEDURE — 82962 GLUCOSE BLOOD TEST: CPT

## 2022-06-22 PROCEDURE — 6370000000 HC RX 637 (ALT 250 FOR IP): Performed by: SPECIALIST

## 2022-06-22 PROCEDURE — 6370000000 HC RX 637 (ALT 250 FOR IP)

## 2022-06-22 PROCEDURE — 83735 ASSAY OF MAGNESIUM: CPT

## 2022-06-22 PROCEDURE — 85025 COMPLETE CBC W/AUTO DIFF WBC: CPT

## 2022-06-22 PROCEDURE — 99222 1ST HOSP IP/OBS MODERATE 55: CPT | Performed by: SURGERY

## 2022-06-22 PROCEDURE — 90935 HEMODIALYSIS ONE EVALUATION: CPT

## 2022-06-22 PROCEDURE — 6370000000 HC RX 637 (ALT 250 FOR IP): Performed by: FAMILY MEDICINE

## 2022-06-22 PROCEDURE — 84100 ASSAY OF PHOSPHORUS: CPT

## 2022-06-22 PROCEDURE — 94668 MNPJ CHEST WALL SBSQ: CPT

## 2022-06-22 PROCEDURE — 80048 BASIC METABOLIC PNL TOTAL CA: CPT

## 2022-06-22 PROCEDURE — 36415 COLL VENOUS BLD VENIPUNCTURE: CPT

## 2022-06-22 PROCEDURE — 92526 ORAL FUNCTION THERAPY: CPT

## 2022-06-22 PROCEDURE — 94640 AIRWAY INHALATION TREATMENT: CPT

## 2022-06-22 PROCEDURE — 2580000003 HC RX 258: Performed by: FAMILY MEDICINE

## 2022-06-22 PROCEDURE — 2060000000 HC ICU INTERMEDIATE R&B

## 2022-06-22 RX ORDER — HYDROCODONE BITARTRATE AND ACETAMINOPHEN 7.5; 325 MG/1; MG/1
1 TABLET ORAL EVERY 6 HOURS PRN
Status: DISCONTINUED | OUTPATIENT
Start: 2022-06-22 | End: 2022-06-29 | Stop reason: HOSPADM

## 2022-06-22 RX ORDER — HYDROCODONE BITARTRATE AND ACETAMINOPHEN 5; 325 MG/1; MG/1
1 TABLET ORAL EVERY 6 HOURS PRN
Status: DISCONTINUED | OUTPATIENT
Start: 2022-06-22 | End: 2022-06-22

## 2022-06-22 RX ORDER — LIDOCAINE 4 G/G
1 PATCH TOPICAL DAILY
Status: DISCONTINUED | OUTPATIENT
Start: 2022-06-22 | End: 2022-06-29 | Stop reason: HOSPADM

## 2022-06-22 RX ORDER — AMOXICILLIN AND CLAVULANATE POTASSIUM 600; 42.9 MG/5ML; MG/5ML
1200 POWDER, FOR SUSPENSION ORAL EVERY 12 HOURS SCHEDULED
Qty: 60 ML | Refills: 0 | Status: SHIPPED | OUTPATIENT
Start: 2022-06-22 | End: 2022-06-25

## 2022-06-22 RX ORDER — HYDROCODONE BITARTRATE AND ACETAMINOPHEN 5; 325 MG/1; MG/1
1 TABLET ORAL EVERY 6 HOURS PRN
Status: DISCONTINUED | OUTPATIENT
Start: 2022-06-22 | End: 2022-06-29 | Stop reason: HOSPADM

## 2022-06-22 RX ORDER — AMOXICILLIN AND CLAVULANATE POTASSIUM 600; 42.9 MG/5ML; MG/5ML
1200 POWDER, FOR SUSPENSION ORAL EVERY 12 HOURS SCHEDULED
Status: COMPLETED | OUTPATIENT
Start: 2022-06-22 | End: 2022-06-25

## 2022-06-22 RX ORDER — LACTOBACILLUS RHAMNOSUS GG 10B CELL
1 CAPSULE ORAL DAILY
Status: DISCONTINUED | OUTPATIENT
Start: 2022-06-22 | End: 2022-06-29 | Stop reason: HOSPADM

## 2022-06-22 RX ORDER — DOXYCYCLINE HYCLATE 100 MG/1
100 CAPSULE ORAL EVERY 12 HOURS SCHEDULED
Qty: 84 CAPSULE | Refills: 0 | Status: ON HOLD | OUTPATIENT
Start: 2022-06-22 | End: 2022-08-10 | Stop reason: HOSPADM

## 2022-06-22 RX ORDER — GABAPENTIN 100 MG/1
200 CAPSULE ORAL 3 TIMES DAILY
Status: DISCONTINUED | OUTPATIENT
Start: 2022-06-22 | End: 2022-06-28

## 2022-06-22 RX ORDER — SACCHAROMYCES BOULARDII 250 MG
250 CAPSULE ORAL 2 TIMES DAILY
Status: DISCONTINUED | OUTPATIENT
Start: 2022-06-22 | End: 2022-06-22

## 2022-06-22 RX ORDER — HYDROCODONE BITARTRATE AND ACETAMINOPHEN 5; 325 MG/1; MG/1
1 TABLET ORAL ONCE
Status: COMPLETED | OUTPATIENT
Start: 2022-06-22 | End: 2022-06-22

## 2022-06-22 RX ADMIN — TIMOLOL MALEATE 1 DROP: 5 SOLUTION OPHTHALMIC at 20:52

## 2022-06-22 RX ADMIN — BUMETANIDE 2 MG: 1 TABLET ORAL at 11:25

## 2022-06-22 RX ADMIN — GABAPENTIN 200 MG: 100 CAPSULE ORAL at 20:47

## 2022-06-22 RX ADMIN — HYDROCODONE BITARTRATE AND ACETAMINOPHEN 0.5 TABLET: 5; 325 TABLET ORAL at 07:35

## 2022-06-22 RX ADMIN — HYDROCODONE BITARTRATE AND ACETAMINOPHEN 1 TABLET: 5; 325 TABLET ORAL at 11:24

## 2022-06-22 RX ADMIN — SODIUM CHLORIDE, PRESERVATIVE FREE 10 ML: 5 INJECTION INTRAVENOUS at 11:25

## 2022-06-22 RX ADMIN — PANTOPRAZOLE SODIUM 20 MG: 20 TABLET, DELAYED RELEASE ORAL at 05:08

## 2022-06-22 RX ADMIN — IPRATROPIUM BROMIDE AND ALBUTEROL SULFATE 1 AMPULE: .5; 2.5 SOLUTION RESPIRATORY (INHALATION) at 15:56

## 2022-06-22 RX ADMIN — HEPARIN SODIUM 5000 UNITS: 10000 INJECTION INTRAVENOUS; SUBCUTANEOUS at 14:26

## 2022-06-22 RX ADMIN — BRIMONIDINE TARTRATE 1 DROP: 2 SOLUTION/ DROPS OPHTHALMIC at 11:28

## 2022-06-22 RX ADMIN — GABAPENTIN 200 MG: 100 CAPSULE ORAL at 14:25

## 2022-06-22 RX ADMIN — ISOSORBIDE MONONITRATE 30 MG: 30 TABLET, EXTENDED RELEASE ORAL at 11:25

## 2022-06-22 RX ADMIN — AMOXICILLIN AND CLAVULANATE POTASSIUM 1200 MG: 600; 42.9 POWDER, FOR SUSPENSION ORAL at 20:47

## 2022-06-22 RX ADMIN — HYDROCODONE BITARTRATE AND ACETAMINOPHEN 1 TABLET: 5; 325 TABLET ORAL at 13:20

## 2022-06-22 RX ADMIN — ASPIRIN 81 MG CHEWABLE TABLET 81 MG: 81 TABLET CHEWABLE at 11:27

## 2022-06-22 RX ADMIN — HYDROCODONE BITARTRATE AND ACETAMINOPHEN 0.5 TABLET: 5; 325 TABLET ORAL at 02:04

## 2022-06-22 RX ADMIN — TIMOLOL MALEATE 1 DROP: 5 SOLUTION OPHTHALMIC at 11:28

## 2022-06-22 RX ADMIN — ACETAMINOPHEN 650 MG: 325 TABLET ORAL at 05:11

## 2022-06-22 RX ADMIN — HEPARIN SODIUM 5000 UNITS: 10000 INJECTION INTRAVENOUS; SUBCUTANEOUS at 20:52

## 2022-06-22 RX ADMIN — ONDANSETRON 4 MG: 4 TABLET, ORALLY DISINTEGRATING ORAL at 05:11

## 2022-06-22 RX ADMIN — METOPROLOL SUCCINATE 25 MG: 25 TABLET, FILM COATED, EXTENDED RELEASE ORAL at 11:25

## 2022-06-22 RX ADMIN — BRIMONIDINE TARTRATE 1 DROP: 2 SOLUTION/ DROPS OPHTHALMIC at 20:52

## 2022-06-22 RX ADMIN — TICAGRELOR 90 MG: 90 TABLET ORAL at 11:28

## 2022-06-22 RX ADMIN — Medication 1 CAPSULE: at 11:24

## 2022-06-22 RX ADMIN — AMOXICILLIN AND CLAVULANATE POTASSIUM 1200 MG: 600; 42.9 POWDER, FOR SUSPENSION ORAL at 11:26

## 2022-06-22 RX ADMIN — IPRATROPIUM BROMIDE AND ALBUTEROL SULFATE 1 AMPULE: .5; 2.5 SOLUTION RESPIRATORY (INHALATION) at 12:51

## 2022-06-22 RX ADMIN — DOXYCYCLINE HYCLATE 100 MG: 100 CAPSULE ORAL at 11:27

## 2022-06-22 RX ADMIN — IPRATROPIUM BROMIDE AND ALBUTEROL SULFATE 1 AMPULE: .5; 2.5 SOLUTION RESPIRATORY (INHALATION) at 20:08

## 2022-06-22 RX ADMIN — DOXYCYCLINE HYCLATE 100 MG: 100 CAPSULE ORAL at 20:47

## 2022-06-22 RX ADMIN — HYDROCODONE BITARTRATE AND ACETAMINOPHEN 1 TABLET: 7.5; 325 TABLET ORAL at 19:35

## 2022-06-22 RX ADMIN — LATANOPROST 1 DROP: 50 SOLUTION OPHTHALMIC at 20:52

## 2022-06-22 ASSESSMENT — PAIN SCALES - GENERAL
PAINLEVEL_OUTOF10: 10
PAINLEVEL_OUTOF10: 10
PAINLEVEL_OUTOF10: 3
PAINLEVEL_OUTOF10: 8
PAINLEVEL_OUTOF10: 6
PAINLEVEL_OUTOF10: 10
PAINLEVEL_OUTOF10: 8

## 2022-06-22 ASSESSMENT — ENCOUNTER SYMPTOMS
COLOR CHANGE: 0
FACIAL SWELLING: 0
VOMITING: 0
ABDOMINAL DISTENTION: 0
DIARRHEA: 0
ABDOMINAL PAIN: 0
SHORTNESS OF BREATH: 0
BACK PAIN: 1
NAUSEA: 0
COUGH: 0
TROUBLE SWALLOWING: 0
CONSTIPATION: 0

## 2022-06-22 ASSESSMENT — PAIN DESCRIPTION - PAIN TYPE: TYPE: ACUTE PAIN

## 2022-06-22 ASSESSMENT — PAIN - FUNCTIONAL ASSESSMENT: PAIN_FUNCTIONAL_ASSESSMENT: PREVENTS OR INTERFERES SOME ACTIVE ACTIVITIES AND ADLS

## 2022-06-22 ASSESSMENT — PAIN DESCRIPTION - DESCRIPTORS
DESCRIPTORS: DISCOMFORT;ACHING;PRESSURE
DESCRIPTORS: ACHING
DESCRIPTORS: SHARP

## 2022-06-22 ASSESSMENT — PAIN DESCRIPTION - ORIENTATION: ORIENTATION: LOWER;MID

## 2022-06-22 ASSESSMENT — PAIN DESCRIPTION - FREQUENCY: FREQUENCY: INTERMITTENT

## 2022-06-22 ASSESSMENT — PAIN DESCRIPTION - LOCATION
LOCATION: ABDOMEN;BACK
LOCATION: BACK
LOCATION: BACK

## 2022-06-22 ASSESSMENT — PAIN DESCRIPTION - ONSET: ONSET: ON-GOING

## 2022-06-22 NOTE — FLOWSHEET NOTE
06/22/22 1111   Vital Signs   BP (!) 138/106   Temp 97.9 °F (36.6 °C)   Heart Rate 57   Resp 20   Weight 140 lb 3.4 oz (63.6 kg)   Weight Method Bed scale   Percent Weight Change -2.6   Pain Assessment   Pain Assessment None - Denies Pain   Post-Hemodialysis Assessment   Post-Treatment Procedures Blood returned; Access bleeding time < 10 minutes   Machine Disinfection Process Exterior Machine Disinfection   Total Liters Processed (l/min) 68.6 l/min   Dialyzer Clearance Lightly streaked   Duration of Treatment (minutes) 180 minutes   Hemodialysis Intake (ml) 500 ml   Hemodialysis Output (ml) 2200 ml   NET Removed (ml) 1700   Tolerated Treatment Fair   Interventions Taken Fluid bolus   Patient Response to Treatment Report to Domingo Ceballos RN   Bilateral Breath Sounds Rhonchi;Diminished   Edema None   Edema Generalized None   RUE Edema None   LUE Edema None   RLE Edema None   LLE Edema None   Time Off 1056   Patient Disposition Return to room

## 2022-06-22 NOTE — CARE COORDINATION
Spoke with maggie at [employer]- she confirmed that patient is a bedhold and can return when medically stable NO precert needed. Pt will need a negative Covid on the day of discharge. Gen surgery following- plan for mediport removal on 6/27 due to Hakan Flores needing to be held.

## 2022-06-22 NOTE — PLAN OF CARE
Problem: Skin/Tissue Integrity  Goal: Absence of new skin breakdown  Description: 1. Monitor for areas of redness and/or skin breakdown  2. Assess vascular access sites hourly  3. Every 4-6 hours minimum:  Change oxygen saturation probe site  4. Every 4-6 hours:  If on nasal continuous positive airway pressure, respiratory therapy assess nares and determine need for appliance change or resting period.   Outcome: Progressing     Problem: Safety - Adult  Goal: Free from fall injury  Outcome: Progressing  Flowsheets  Taken 6/21/2022 2211 by Misti Amin RN  Free From Fall Injury: Instruct family/caregiver on patient safety  Taken 6/21/2022 1034 by Jacinda Cameron RN  Free From Fall Injury: Instruct family/caregiver on patient safety     Problem: ABCDS Injury Assessment  Goal: Absence of physical injury  Outcome: Progressing  Flowsheets  Taken 6/21/2022 2211 by Misti Amin RN  Absence of Physical Injury: Implement safety measures based on patient assessment  Taken 6/21/2022 1034 by Jacinda Cameron RN  Absence of Physical Injury: Implement safety measures based on patient assessment     Problem: Chronic Conditions and Co-morbidities  Goal: Patient's chronic conditions and co-morbidity symptoms are monitored and maintained or improved  Outcome: Progressing  Flowsheets (Taken 6/21/2022 1915)  Care Plan - Patient's Chronic Conditions and Co-Morbidity Symptoms are Monitored and Maintained or Improved: Monitor and assess patient's chronic conditions and comorbid symptoms for stability, deterioration, or improvement     Problem: Pain  Goal: Verbalizes/displays adequate comfort level or baseline comfort level  Outcome: Progressing

## 2022-06-22 NOTE — PROGRESS NOTES
[address] [address]  Progress Note    Chief complaint :  Chief Complaint   Patient presents with    Shortness of Breath     for a few days per pt    Altered Mental Status     per NH staff, pt able to answer all orientation questions       Subjective:  Patient resting comfortably easily arousable. Patient states that she has not had any bowel movements overnight. Patient has 6 out of 10 abdominal pain which she states is radiating from her right back around and down her abdomen. Patient states she has been trying to eat a little bit and has been tolerating her diet. States that she feels like she is breathing normally without any difficulty and has not been coughing up much. Patient denies having any fever, chills, shortness of breath, chest pain. Past medical, surgical, family and social history were reviewed, non-contributory, and unchanged unless otherwise stated. Objective:  BP (!) 100/54   Pulse 71   Temp 97.4 °F (36.3 °C) (Oral)   Resp 16   Ht 5' 10\" (1.778 m)   Wt 141 lb 8.6 oz (64.2 kg)   SpO2 95%   BMI 20.31 kg/m²   Vital signs reviewed     Physical Exam  Vitals and nursing note reviewed. Constitutional:       Comments: Sleeping comfortably. HENT:      Head: Normocephalic and atraumatic. Nose: Nose normal. No congestion or rhinorrhea. Eyes:      General: No scleral icterus. Conjunctiva/sclera: Conjunctivae normal.   Neck:      Vascular: No carotid bruit. Cardiovascular:      Rate and Rhythm: Normal rate and regular rhythm. Heart sounds: Murmur (2/6 systolic ) heard. No gallop. Pulmonary:      Effort: Pulmonary effort is normal.      Breath sounds: Normal breath sounds. No wheezing or rales. Comments:     Chest:   Breasts:      Right: Absent. Comments: Surgical scar closed and intact  Abdominal:      General: Bowel sounds are normal. There is no distension. Palpations: Abdomen is soft. There is no mass. Tenderness: There is abdominal tenderness (diffuse to minimal touch almost no touch ). There is no guarding or rebound. Musculoskeletal:         General: Tenderness (Rt lower back where the pain pump located) present. Normal range of motion. Cervical back: Normal range of motion and neck supple. Right lower leg: No edema. Left lower leg: No edema. Comments:  Middle distal phlanx amputed. Right lymphedema    Skin:     Coloration: Skin is not jaundiced. Comments: Port on right chest dressed appropriately    Multiple surgical scars healed scabs located throughout patient's back. Neurological:      General: No focal deficit present. Mental Status: She is alert and oriented to person, place, and time.    Psychiatric:         Mood and Affect: Mood normal.       Patient no longer has IV access line  Hemodialysis access on the left arm    Labs:  Labs Reviewed    Recent Results (from the past 24 hour(s))   Basic Metabolic Panel w/ Reflex to MG    Collection Time: 06/22/22  7:50 AM   Result Value Ref Range    Sodium 138 132 - 146 mmol/L    Potassium reflex Magnesium 3.6 3.5 - 5.0 mmol/L    Chloride 99 98 - 107 mmol/L    CO2 25 22 - 29 mmol/L    Anion Gap 14 7 - 16 mmol/L    Glucose 116 (H) 74 - 99 mg/dL    BUN 15 6 - 23 mg/dL    CREATININE 3.2 (H) 0.5 - 1.0 mg/dL    GFR Non-African American 18 >=60 mL/min/1.73    GFR African American 18     Calcium 9.8 8.6 - 10.2 mg/dL   CBC with Auto Differential    Collection Time: 06/22/22  7:50 AM   Result Value Ref Range    WBC 5.0 4.5 - 11.5 E9/L    RBC 3.73 3.50 - 5.50 E12/L    Hemoglobin 10.9 (L) 11.5 - 15.5 g/dL    Hematocrit 34.4 34.0 - 48.0 %    MCV 92.2 80.0 - 99.9 fL    MCH 29.2 26.0 - 35.0 pg    MCHC 31.7 (L) 32.0 - 34.5 %    RDW 18.5 (H) 11.5 - 15.0 fL    Platelets 912 199 - 218 E9/L    MPV 10.5 7.0 - 12.0 fL    Neutrophils % 59.2 43.0 - 80.0 %    Immature Granulocytes % 0.8 0.0 - 5.0 %    Lymphocytes % 21.9 20.0 - 42.0 %    Monocytes % 14. 3 (H) 2.0 - 12.0 %    Eosinophils % 2.8 0.0 - 6.0 %    Basophils % 1.0 0.0 - 2.0 %    Neutrophils Absolute 2.97 1.80 - 7.30 E9/L    Immature Granulocytes # 0.04 E9/L    Lymphocytes Absolute 1.10 (L) 1.50 - 4.00 E9/L    Monocytes Absolute 0.72 0.10 - 0.95 E9/L    Eosinophils Absolute 0.14 0.05 - 0.50 E9/L    Basophils Absolute 0.05 0.00 - 0.20 E9/L   Phosphorus    Collection Time: 06/22/22  7:50 AM   Result Value Ref Range    Phosphorus 3.0 2.5 - 4.5 mg/dL   Magnesium    Collection Time: 06/22/22  7:50 AM   Result Value Ref Range    Magnesium 2.0 1.6 - 2.6 mg/dL   Basic Metabolic Panel    Collection Time: 06/22/22  7:50 AM   Result Value Ref Range    Potassium 3.6 3.5 - 5.0 mmol/L   POCT Glucose    Collection Time: 06/22/22  8:45 PM   Result Value Ref Range    Meter Glucose 121 (H) 74 - 99 mg/dL       Radiology and other tests reviewed:  CT ABDOMEN PELVIS W IV CONTRAST Additional Contrast? None   Final Result   1. Pattern of generalized anasarca. 2.  No conspicuous localized inflammatory process in the mid mesentery fat   planes. No signs for acute diverticulitis. No bowel obstruction. No free   intraperitoneal air. The no ascites. 3.  Findings for inflammatory process/discitis in the levels of L1-L2 L3   which have been previously described. 4.  Previous spinal fusion of L3/L4/L5 which has been previously described. 5.  Edema with generalized anasarca correlate with volume overload and   chronic renal failure. The extensive vascular arterial calcifications   correlate also with chronic renal failure. RECOMMENDATIONS:   Unavailable         XR ABDOMEN (KUB) (SINGLE AP VIEW)   Final Result   Colonic diverticulosis. Fluoroscopy modified barium swallow with video   Final Result   1. No barium aspiration or significant swallowing deficits. 2.  Transient laryngeal penetration with thin liquid barium. 3.  Cervical spine degenerative spondylosis.       4.  Please see separate speech pathology report for full discussion of   findings and recommendations. CT HEAD WO CONTRAST   Final Result   No acute intracranial abnormality. CT CHEST WO CONTRAST   Final Result   1. New ground-glass and airspace consolidations throughout right lung notable   in right lower lobe as well as minimal opacities in left lower lobe   suggesting interstitial pulmonary edema or bilateral pneumonia. 2. Cardiomegaly with pulmonary vascular congestion. 3. Multiple stable prominent mediastinal lymph nodes. XR CHEST PORTABLE   Final Result   No acute process.              Assessment:  Active Hospital Problems    Diagnosis Date Noted    Patient is Sikhism [Z78.9] 11/07/2017     Priority: High     Class: Chronic    Acute respiratory failure with hypoxia (HCC) [J96.01]      Priority: Medium    Lower abdominal pain [R10.30]      Priority: Medium    Healthcare-associated pneumonia [J18.9] 06/16/2022     Priority: Medium    Hypoxia [R09.02] 06/16/2022     Priority: Medium    Ischemic cardiomyopathy [I25.5]      Priority: Medium    AMS (altered mental status) [R41.82] 06/15/2022     Priority: Medium    Elevated troponin [R77.8] 02/17/2017     Priority: Medium    Pain [R52] 06/15/2022    CAD in native artery [I25.10] 12/09/2021    Mitral valve disease [I05.9] 11/11/2021    Lymphedema of right upper extremity [I89.0]     ESRD (end stage renal disease) on dialysis ([address] [N18.6, Z99.2]     Diabetic peripheral neuropathy ([facility] [address] [E11.42] 08/30/2018    Controlled type 2 diabetes mellitus with chronic kidney disease on chronic dialysis, with long-term current use of insulin ([address] [E11.22, N18.6, Z79.4, Z99.2] 02/22/2017    Glaucoma [H40.9]        Plan:  Exposed PowerPort  Not to be used for blood draws, not functioning   Blood cultures negative  ID following appreciate input  Surgery following appreciate input - removal of port 6/27/2022 as outpatient    Bilateral lower lobe Health-care associated pneumonia- improving   Patient on room air   Zosyn 4.5 g Q12H completed 7 days , Augmentin ES 1000 mg renally adjusted for 2 more days    Duonebs Q4H PRN  Guaifenesin 400mg QID PRN  Chest therapy, incentive spirometer,   CBC, BMP daily  ID following appreciate input    Fecal incontinence unknown etiology at this time  Patient states she is having diarrhea, no bowel movements overnight  C. difficile rule out  C. difficile isolation  Outpatient colonoscopy    Colonic diverticulosis  Bilateral lower quadrant abdominal pain unknown cause - could be radiating from   KUB showed colonic diverticulosis, dilute contrast and lower GI tract no free air or bowel wall pneumonitis or dilated bowel. CT contrast abdomen and pelvis ordered showed no acute diverticulitis, and anascara generalized, small hiatal hernia  Pain control-Norco 5 to 7.5 every 6 hours as needed for moderate and severe pain  Continue to monitor    Chronic back pain  Patient has multiple fusions and a history of back pain . Patient has a tender , and working stimulator in the right lower back.   Pain control Norco 0.5 p.o. every 6 hours as needed  Lidocaine patch   Neurosurgery contacted for further recommendations on pain pump    Vertebral osteomyelitis  Stable  Completed 6 weeks of IV vancomycin 750 mg IV antibiotic after dialysis MWF  Doxycyline 100 mg Q12H for suppression of osteomyelitis. -Will continue when discharged  ID following appreciate input    Mild to moderate pharyngeal phase dysphagia aspiration  Fluoroscopic modified barium swallow study showed no barium aspiration no significant swallowing deficits, transient laryngeal penetration is within thin liquid barium, cervical spine degenerative spondylitis  Avoid thin liquids with a straw  Speech language pathology following appreciate input      End-stage renal disease hemodialysis MWF  Creatinine baseline 3, today creatinine 3.2  Continue home Fosrenol 1 g TID- held  Continue Bumex 2mg  BMP, mag, Phos daily  Nephrology following appreciate input    CAD with NSTEMI 1 month ago  Continue home metoprolol 25 mg daily, Imdur 30 mg daily, ticagrelor 90mg BID     CHF  Last echo with EF of 40 to 45%, dilated LA.   Monitor for fluid overload  Daily weights  Strict ins and outs  Continue home metoprolol succinate 25 mg daily, Imdur 30 mg daily    Anemia of chronic disease   No blood to be given to patient  Daily CBC   hemoglobin today -10.9    Insulin-dependent diabetes type 2  Last A1c 5.1  POCT glucose checks  Per glycemia protocol    History of hypertension  Currently soft blood pressures  Midodrine 5 mg daily as needed if blood pressure less than 100/60  Monitor blood pressures    Glaucoma right eye   bromonidine 0.2% ophthalmic solution 1 drop BID   Latanoprost 0.005% ophthalmic solution 1 drop  Nightly   Timolol 0.5% ophthalmic solution 1 drop BID       DVT ppx: Heparin 5000 units TID  GI ppx: none  Code Status: Full  Diet: Carb controlled -no straw use    Disposition: Discharge to pending clinical course  Marielos Fatima MD  Family [id number]

## 2022-06-22 NOTE — PROGRESS NOTES
[facility]  Nephrology Progress Note    Patient's Name: Daryle Staggers      History of Present Illness-full consult deferred as pt followed longitudinally at dialysis:    \"Claudia Acosta is a 78 y.o. female with a past medical history of breast cancer, coronary artery disease, hypertension, hyperlipidemia, and anemia. She presented to the [address] [facility] ED on 5/2 with complaints of back and abdominal pain patient was subsequently transferred to [facility] for NSTEMI. Patient is known to our service and dialyzes at [address] MWF 1st shift left AV fistula. In April she was Dx with DOMINICK zimmerman and is treated with iv Vanc  And has a mediport in the R  Pt is now admitted from the ED for cough and hypoxia. She came to the ED after having ongoing pain in the RLE when sitting, no significant pain when standing or using her walker. She notes since 6 days ago she had a cough productive of grey sputum. Resp Viral Panel (+) for SARS-CoV-2\"    6/22/22- Alert, resting in bed. Continues to have RLQ abd pain and lower back pain. PMH:    Past Medical History:   Diagnosis Date    Acute infection of bone ([address]     infection of rt foot, resolved.     Acute osteomyelitis of phalanx of left hand ([address] 1/27/2022    Left third distal phalanx    Anemia of chronic disease     Arthritis     Breast cancer ([address]     right breast, 2008/ bladder, 2006- last chemotherapy \"years ago\"    CAD (coronary artery disease)     Carpal tunnel syndrome     bilat - for OR left hand 3-17-20     Chronic diastolic CHF (congestive heart failure) ([address] 09/23/2014 9/23/14- echocardiogram revealed moderate LV concentric hypertrophy, stage III diastolic dysfunction, mild tricuspid regurgitation    CKD (chronic kidney disease) stage 4, GFR 15-29 ml/min ([facility])     Diabetic retinopathy ([address]     Glaucoma     Hemodialysis patient ([address]     [facility] wed fri- Dr. Justine Rivera - left arm fistula     Hyperkalemia, diminished renal excretion 11/9/2017    Hyperlipidemia     Hypertension     Hypoglycemia unawareness in type 1 diabetes mellitus ([address] 11/7/2017    Insulin dependent type 2 diabetes mellitus (HCC)     Neuropathy     feet    Osteomyelitis due to secondary diabetes ([address]     rt great toe with amputation    Patient is Zoroastrianism 11/7/2017    Refusal of blood product     patient states she dose not take blood transfusion    Ventricular hypertrophy     Ventricular tachycardia ([address] 5/24/2021    Vitreous hemorrhage (HCC)     left eye     Patient Active Problem List   Diagnosis    Diabetic retinopathy ([address]    Malignant neoplasm of right female breast ([address]    Atherosclerosis of native coronary artery of native heart without angina pectoris    Moderate obesity    Left ventricular hypertrophy    Herniated lumbar intervertebral disc    Lumbar degenerative disc disease    Pseudomeningocele    Lumbar radiculopathy    Lymphedema of arm    Anemia of chronic disease    Chronic diastolic CHF (congestive heart failure) ([address]    Hypertension    Glaucoma    Refusal of blood product    Elevated troponin    Controlled type 2 diabetes mellitus with chronic kidney disease on chronic dialysis, with long-term current use of insulin (HCC)    Vitreous hemorrhage ([address]    Patient is Zoroastrianism    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 ([address]    Chronic pain syndrome    Lumbar post-laminectomy syndrome    Cervicalgia    Diabetic peripheral neuropathy ([address]    ESRD (end stage renal disease) ([address]    Bilateral carpal tunnel syndrome    Cardiac arrest ([address]    ESRD (end stage renal disease) on dialysis ([address]    Mixed hyperlipidemia    Lymphedema of right upper extremity    Coronary artery disease involving native coronary artery of native heart with angina pectoris ([address]    Mitral valve disease    CAD in native artery    Lumbar stenosis without neurogenic claudication    Intractable low back pain    Unable to ambulate    NSTEMI (non-ST elevated myocardial infarction) (HCC)    Moderate protein-calorie malnutrition (HCC)    AMS (altered mental status)    Ischemic cardiomyopathy    Healthcare-associated pneumonia    Hypoxia    Acute respiratory failure with hypoxia (HCC)    Lower abdominal pain    Pain     Meds:     lidocaine  1 patch TransDERmal Daily    amoxicillin-clavulanate  1,200 mg Oral 2 times per day    lactobacillus  1 capsule Oral Daily    alteplase  2 mg IntraCATHeter Once    ipratropium-albuterol  1 ampule Inhalation 4x daily    doxycycline hyclate  100 mg Oral 2 times per day    bumetanide  2 mg Oral Once per day on Tue Thu Sat    isosorbide mononitrate  30 mg Oral Daily    latanoprost  1 drop Right Eye Nightly    metoprolol succinate  25 mg Oral Daily    pantoprazole  20 mg Oral QAM AC    [Held by provider] ticagrelor  90 mg Oral BID    sodium chloride flush  5-40 mL IntraVENous 2 times per day    [Held by provider] lanthanum  1,000 mg Oral TID WC    heparin (porcine)  5,000 Units SubCUTAneous TID    brimonidine  1 drop Right Eye BID    timolol  1 drop Right Eye BID    aspirin  81 mg Oral Daily      sodium chloride 25 mL (06/19/22 8954)    dextrose       Meds prn:     HYDROcodone-acetaminophen, guaiFENesin, midodrine, sodium chloride flush, sodium chloride, ondansetron **OR** ondansetron, polyethylene glycol, acetaminophen **OR** acetaminophen, glucose, dextrose bolus **OR** dextrose bolus, glucagon (rDNA), dextrose    Meds prior to admission:     No current facility-administered medications on file prior to encounter. Current Outpatient Medications on File Prior to Encounter   Medication Sig Dispense Refill    gabapentin (NEURONTIN) 100 MG capsule Take 2 capsules by mouth 3 times daily for 180 days.  180 capsule 5    atorvastatin (LIPITOR) 80 MG tablet Take 80 mg by mouth nightly      bumetanide (BUMEX) 2 MG tablet Take 2 mg by mouth three times a week Given Sunday,Tuesday,Thursday      isosorbide mononitrate (IMDUR) 30 MG extended release tablet Take 30 mg by mouth daily      insulin glargine (LANTUS) 100 UNIT/ML injection vial Inject 5 Units into the skin nightly       metoprolol succinate (TOPROL XL) 25 MG extended release tablet Take 1 tablet by mouth daily 90 tablet 1    lanthanum (FOSRENOL) 1000 MG chewable tablet Take 1,000 mg by mouth 3 times daily (with meals)       allopurinol (ZYLOPRIM) 100 MG tablet Take 1 tablet by mouth daily 90 tablet 1    ticagrelor (BRILINTA) 90 MG TABS tablet Take 1 tablet by mouth 2 times daily 180 tablet 1    B Complex-C-Folic Acid (BONI CAPS) 1 MG CAPS Take 1 mg by mouth nightly       omeprazole (PRILOSEC) 20 MG delayed release capsule Take 20 mg by mouth daily as needed       LUMIGAN 0.01 % SOLN ophthalmic drops Place 1 drop into the right eye nightly       COMBIGAN 0.2-0.5 % ophthalmic solution Place 1 drop into the right eye every 12 hours   7    aspirin 81 MG EC tablet Take 1 tablet by mouth daily 30 tablet 0    midodrine (PROAMATINE) 5 MG tablet Take 5 mg by mouth daily as needed      lidocaine-prilocaine (EMLA) 2.5-2.5 % cream Apply topically as needed for Pain        Allergies:    Furosemide    Social History:     reports that she has never smoked. She has never used smokeless tobacco. She reports that she does not drink alcohol and does not use drugs.     Family History:         Problem Relation Age of Onset   [de-identified] Breast Cancer Mother [address] [place] [address]    Hypertension Mother     Heart Disease Father     Prostate Cancer Father     Breast Cancer Maternal Grandmother [facility]     Physical Exam:    Patient Vitals for the past 24 hrs:   BP Temp Temp src Pulse Resp SpO2 Weight   06/22/22 1154 -- -- -- -- 18 -- --   06/22/22 1123 (!) 104/55 -- -- 68 24 100 % --   06/22/22 1111 (!) 138/106 97.9 °F (36.6 °C) -- 57 20 -- 140 lb 3.4 oz (63.6 kg)   06/22/22 1030 (!) 103/101 -- -- 62 -- -- -- 06/22/22 1000 100/66 -- -- 72 -- -- --   06/22/22 0930 (!) 155/107 -- -- 77 -- -- --   06/22/22 0900 (!) 93/43 -- -- (!) 45 -- -- --   06/22/22 0830 (!) 200/53 -- -- 80 -- -- --   06/22/22 0757 116/88 -- -- 80 -- -- --   06/22/22 0740 (!) 149/115 99.4 °F (37.4 °C) -- 67 20 -- 143 lb 15.4 oz (65.3 kg)   06/22/22 0515 (!) 148/65 -- -- -- -- -- --   06/22/22 0429 -- -- -- -- -- -- 146 lb 2.6 oz (66.3 kg)   06/22/22 0234 -- -- -- -- 18 -- --   06/22/22 0200 (!) 135/35 99 °F (37.2 °C) Oral 80 18 99 % --   06/21/22 2054 -- -- -- -- 16 -- --   06/21/22 1915 (!) 165/84 98.6 °F (37 °C) Oral 79 16 97 % --   06/21/22 1800 100/60 98.4 °F (36.9 °C) Oral 76 16 99 % --   06/21/22 1745 (!) 97/57 98 °F (36.7 °C) -- 70 18 -- 147 lb 4.3 oz (66.8 kg)   06/21/22 1730 105/61 -- -- 73 -- -- --   06/21/22 1700 127/67 -- -- 72 -- -- --   06/21/22 1630 (!) 152/42 -- -- 69 -- -- --   06/21/22 1600 (!) 165/75 -- -- 69 -- -- --   06/21/22 1530 (!) 178/59 -- -- 69 -- -- --   06/21/22 1500 (!) 162/49 -- -- 68 -- -- --   06/21/22 1450 (!) 184/42 -- -- 68 -- -- --   06/21/22 1430 (!) 169/60 97.6 °F (36.4 °C) -- -- 16 -- 151 lb 14.4 oz (68.9 kg)       Intake/Output Summary (Last 24 hours) at 6/22/2022 1345  Last data filed at 6/22/2022 1111  Gross per 24 hour   Intake 600 ml   Output 4500 ml   Net -3900 ml     General: Awake, alert, no acute distress  Neck: No JVD noted  Lungs: diminished throughout, no adventitious sounds. Mediport in the R chest  CV: RRR, no rub  Abd: Soft, pain LRQ extending to right side, nondistended  Skin: Warm and dry. No rash on exposed extremities  Ext: 1+ RUE edema (h/o breast CA);  No BLE edema ; left AV fistula in the upper ext, +thrill, +bruit  Neuro: Alert & oriented, answers questions appropriately    Data:    Recent Labs     06/20/22  0419 06/21/22  1255 06/22/22  0750   WBC 5.2 4.3* 5.0   HGB 9.5* 10.2* 10.9*   HCT 30.4* 33.0* 34.4   MCV 93.5 91.7 92.2    222 220     Recent Labs     06/20/22  0419 06/20/22  0419 06/21/22  1255 06/22/22  0750     --  140 138   K 3.9   < > 3.8 3.6  3.6     --  101 99   CO2 25  --  25 25   CREATININE 5.2*  --  3.9* 3.2*   BUN 29*  --  18 15   LABGLOM 10  --  14 18   GLUCOSE 115*  --  101* 116*   CALCIUM 9.2  --  9.3 9.8   PHOS 3.1  --  2.9 3.0   MG 2.1  --  2.0 2.0    < > = values in this interval not displayed. Vit D, 25-Hydroxy   Date Value Ref Range Status   03/11/2022 28 (L) 30 - 100 ng/mL Final     Comment:     <20 ng/mL. ........... Rhenda Leanna Deficient  20-30 ng/mL. ......... Rhenda Leanna Insufficient   ng/mL. ........ Rhenda Leanna Sufficient  >100 ng/mL. .......... Rhenda Leanna Toxic       PTH   Date Value Ref Range Status   03/11/2022 241 (H) 15 - 65 pg/mL Final     No results for input(s): ALT, AST, ALKPHOS, BILITOT, BILIDIR in the last 72 hours. No results for input(s): LABALBU in the last 72 hours.   Ferritin   Date Value Ref Range Status   06/15/2022 2,221 ng/mL Final     Comment:     FERRITIN Reference Ranges:  Adult Males   20 - 60 years:    30 - 400 ng/mL  Adult females 16 - 61 years:    15 - 150 ng/mL  Adults greater than 60 years:   no established reference range  Pediatrics:                     no established reference range       Iron   Date Value Ref Range Status   06/15/2022 31 (L) 37 - 145 mcg/dL Final     TIBC   Date Value Ref Range Status   06/15/2022 139 (L) 250 - 450 mcg/dL Final     Vitamin B-12   Date Value Ref Range Status   02/17/2017 1802 (H) 211 - 946 pg/mL Final     Folate   Date Value Ref Range Status   02/17/2017 >20.0 4.8 - 24.2 ng/mL Final     Lab Results   Component Value Date    COLORU Yellow 11/07/2017    NITRU Negative 11/07/2017    GLUCOSEU Negative 11/07/2017    GLUCOSEU NEGATIVE 08/27/2011    KETUA Negative 11/07/2017    UROBILINOGEN 0.2 11/07/2017    BILIRUBINUR Negative 11/07/2017    BILIRUBINUR NEGATIVE 08/27/2011     No results found for: ALVINO, CREURRAN, MACREATRATIO, OSMOU    No components found for: URIC    No results found for: LIPIDPAN    Assessment and Plans: 1. ESKD on HD  OP [facility] MWF 1st shift   left AV fistula  Abt CT showed anasarca  PLAN:  1. Continue HD MWF  2. Fluid removal with HD  3. Follow daily labs    2. Recent  NSTEMI  S/p cardiac catheterization with balloon angioplasty 5/5  Cardiology previously following  Brilinta on hold for mediport removal on 6/27      3. Back pain  S/p bone biopsy 4/18 for lumbar spine osteomyelitis  Had been on vancomycin for this  Now on zosyn and vibramycin   ID following    4. Hypertension  BP goal<140/90  BP at goal  PLAN:  1. Monitor on current regimen and with HD    5. Anemia in CKD   Hemoglobin target 10-12  Hemoglobin 10.9 today  PLAN:  1. Continue JAYSHREE as hemoglobin below target  2. Transfuse for hemoglobin<7  3. Monitor H&H    6.  Secondary hyperparathyroidism of renal origin  PTH noted in the outpatient setting on 4/25/2022 was 443  Phosphorus 2.1-->2.7-->3.1-->2.9-->3.0  PLAN:  1. Continue to Hold Fosrenol and recheck PO4 in the am  2. Monitor labs    7. Asp Pne/HCAP with recent COVID 19 Pne    8. Abd Pain  CT of the abd shows anasarca and generally benign abdominal findings  General surgery consulted          GOYO Hein - CNS   Patient seen and examined. I had a face to face encounter with the patient. Pt today on IHD had an episode severe RLQ pain and back pain with an episode of intradialytic hypotension SBP down to 93/43. At the time of my visit post treatment she still had back pain  Agree with exam.    Agree with  formulation, assessment and plan as outlined above and directed by me. Addition and corrections were done as deemed appropriate. My exam and plan include:     A/P:  1. ESKD-with intradialytic hypotension-BP improved when the UFR decreased. Pt had total 1.7L removal    2.  Vertebral Osteomyelitis-she is for Power port removal on 6/27    Continue current treatment as outlined above    TRES Flores MD

## 2022-06-22 NOTE — PROGRESS NOTES
Occupational Therapy  Patient treatment attempted this AM.  Patient off the nursing unit for dialysis. Will attempt at a later time.   Eduard TRACY/L [id number]

## 2022-06-22 NOTE — CONSULTS
GENERAL SURGERY  CONSULT NOTE  6/22/2022    Physician Consulted: Dr. Patrick Boone  Reason for Consult: exposed port  Referring Physician: Dr. Papa Rose    HPI  Destiney Rees is a 78 y.o. female who presents to the general surgery service for evaluation of exposed port. The patient is currently admitted for treatment of pneumonia and osteomyelitis of the spine and left 3rd finger. She originally had her port placed over 5 years ago by Dr. Maximino Russo for treatment of breast cancer. She has since completed treatment for her breast cancer, but continues to use her port for lab draws and medication infusion. Recently, she has been receiving vancomycin through her port 3 times per week for treatment of osteomyelitis. She states that the inferior edge of her port has been exposed for approximately 1 year. Medical history is significant for ESRD on HD, NSTEMI, DM and breast cancer. The patient is taking aspirin and Brilinta, with last dose of each today. Since admission, the patient has been receiving Zosyn and doxycycline. Blood cultures have had no growth. Today, Zosyn was changed to Augmentin. Her PowerPort had been used throughout this admission. However, due to concern for port exposure, it has not been used today. Past Medical History:   Diagnosis Date    Acute infection of bone ([address]     infection of rt foot, resolved.     Acute osteomyelitis of phalanx of left hand ([address] 1/27/2022    Left third distal phalanx    Anemia of chronic disease     Arthritis     Breast cancer ([address]     right breast, 2008/ bladder, 2006- last chemotherapy \"years ago\"    CAD (coronary artery disease)     Carpal tunnel syndrome     bilat - for OR left hand 3-17-20     Chronic diastolic CHF (congestive heart failure) ([address] 09/23/2014 9/23/14- echocardiogram revealed moderate LV concentric hypertrophy, stage III diastolic dysfunction, mild tricuspid regurgitation    CKD (chronic kidney disease) stage 4, GFR 15-29 ml/min ([facility]) turned off as of 3-10-20     IL AV ANAST,UP ARM BASILIC VEIN TRANSPOSIT Left 05/15/2018    TRANSPOSITION STAGE II AV FISTULA - LEFT UPPER ARM performed by Brianda Frank MD at [facility] ANGIOACCESS AV FISTULA Left 09/25/2018    SUPERFICIALIZATION AV FISTULA - LEFT ARM performed by Brianda Frank MD at [address] [facility] W/VITRECTOMY ANY METH Left 04/10/2018    PARS PLANA VITRECTOMY 25 GAUGE RETINAL DETACHMENT REPAIR air fluid exchange, endolaser performed by Vini Marc MD at [address] [place] [address] TRANSESOPHAGEAL ECHOCARDIOGRAM  11/11/2021    Dr. Tucker June TRANSESOPHAGEAL ECHOCARDIOGRAM  04/19/2022        TUNNELED VENOUS CATHETER PLACEMENT  11/15/2017    VITRECTOMY Left 04/10/2018    PARS PLANA VITRECTOMY; RETINAL DETACHMENT REPAIR; GAS BUBBLE; LASER LEFT EYE       Medications Prior to Admission:    Prior to Admission medications    Medication Sig Start Date End Date Taking? Authorizing Provider   amoxicillin-clavulanate (AUGMENTIN-ES) 600-42.9 MG/5ML suspension Take 10 mLs by mouth every 12 hours for 3 days 6/22/22 6/25/22 Yes Marga Finney APRN - CNP   doxycycline hyclate (VIBRAMYCIN) 100 MG capsule Take 1 capsule by mouth every 12 hours 6/22/22 8/3/22 Yes GOYO Soto - CNP   gabapentin (NEURONTIN) 100 MG capsule Take 2 capsules by mouth 3 times daily for 180 days.  3/24/22 9/20/22 Yes Zulema Little., MD   atorvastatin (LIPITOR) 80 MG tablet Take 80 mg by mouth nightly   Yes Historical Provider, MD   bumetanide (BUMEX) 2 MG tablet Take 2 mg by mouth three times a week Given Sunday,Tuesday,Thursday   Yes Historical Provider, MD   isosorbide mononitrate (IMDUR) 30 MG extended release tablet Take 30 mg by mouth daily   Yes Historical Provider, MD   insulin glargine (LANTUS) 100 UNIT/ML injection vial Inject 5 Units into the skin nightly    Yes Historical Provider, MD   metoprolol succinate (TOPROL XL) 25 MG extended release tablet Take 1 tablet by mouth daily 11/24/21  Yes El Arce MD   lanthanum (FOSRENOL) 1000 MG chewable tablet Take 1,000 mg by mouth 3 times daily (with meals)  8/9/21  Yes Historical Provider, MD   allopurinol (ZYLOPRIM) 100 MG tablet Take 1 tablet by mouth daily 9/7/21  Yes El Arce MD   ticagrelor (BRILINTA) 90 MG TABS tablet Take 1 tablet by mouth 2 times daily 9/7/21  Yes El Arce MD   B Complex-C-Folic Acid (BONI CAPS) 1 MG CAPS Take 1 mg by mouth nightly    Yes Historical Provider, MD   omeprazole (PRILOSEC) 20 MG delayed release capsule Take 20 mg by mouth daily as needed    Yes Historical Provider, MD MCHUGH 0.01 % SOLN ophthalmic drops Place 1 drop into the right eye nightly  6/9/20  Yes Historical Provider, MD   COMBIGAN 0.2-0.5 % ophthalmic solution Place 1 drop into the right eye every 12 hours  8/1/19  Yes Historical Provider, MD   aspirin 81 MG EC tablet Take 1 tablet by mouth daily 5/9/22 6/8/22  Ivy Hale MD   midodrine (PROAMATINE) 5 MG tablet Take 5 mg by mouth daily as needed    Historical Provider, MD   lidocaine-prilocaine (EMLA) 2.5-2.5 % cream Apply topically as needed for Pain     Historical Provider, MD       Allergies   Allergen Reactions    Furosemide Swelling and Other (See Comments)     Patient states \"I swelled up like a pig. \" states her kidneys shut down         Family History   Problem Relation Age of Onset    Breast Cancer Mother 61    Hypertension Mother     Heart Disease Father     Prostate Cancer Father     Breast Cancer Maternal Grandmother 61       Social History     Tobacco Use    Smoking status: Never Smoker    Smokeless tobacco: Never Used   Vaping Use    Vaping Use: Never used   Substance Use Topics    Alcohol use: No    Drug use: No         Review of Systems   Constitutional: Positive for appetite change, chills, fatigue and fever. HENT: Negative for facial swelling and trouble swallowing.     Respiratory: Negative for cough and shortness of breath. Cardiovascular: Negative for chest pain and palpitations. Gastrointestinal: Negative for abdominal distention, abdominal pain, constipation, diarrhea, nausea and vomiting. Endocrine: Negative for polydipsia and polyphagia. Genitourinary: Positive for difficulty urinating. Negative for dysuria. Musculoskeletal: Positive for arthralgias and back pain. Skin: Positive for wound. Negative for color change and rash. Neurological: Negative for dizziness and weakness. Psychiatric/Behavioral: Negative for agitation, behavioral problems and confusion. PHYSICAL EXAM:    Vitals:    06/22/22 1111   BP: (!) 138/106   Pulse: 57   Resp: 20   Temp: 97.9 °F (36.6 °C)   SpO2:        General Appearance:  awake, alert, oriented, uncomfortable  Skin:  Skin color, texture, turgor normal. No rashes or lesions. Head/face:  NCAT  Eyes:  No gross abnormalities. Sclera nonicteric  Lungs/Chest:  Normal expansion. No respiratory distress. On room air. PowerPort on right chest wall, inferior metal edge exposed, no erythema or drainage. See picture below  Heart: Warm throughout. Regular rate   Abdomen:  Soft, no tenderness, non distended. No palpable organomegaly or masses. Extremities: Extremities warm to touch. LUE fistula with palpable thrill            LABS:    CBC  Recent Labs     06/22/22  0750   WBC 5.0   HGB 10.9*   HCT 34.4        BMP  Recent Labs     06/22/22  0750      K 3.6   CL 99   CO2 25   BUN 15   CREATININE 3.2*   CALCIUM 9.8     Liver Function  No results for input(s): AMYLASE, LIPASE, BILITOT, BILIDIR, AST, ALT, ALKPHOS, PROT, LABALBU in the last 72 hours. No results for input(s): LACTATE in the last 72 hours. No results for input(s): INR, PTT in the last 72 hours. Invalid input(s): PT    RADIOLOGY    I have personally reviewed all relevant labs and imaging. No current leukocytosis  Blood cultures NGTD      ASSESSMENT:  78 y.o. female with exposed [place]. Admitted for treatment of pneumonia and osteomyelitis    PLAN:   Hold isaac Jaimes to continue aspirin   Port removal 6/27   Antibiotics per ID   Keep port site covered with gauze until removed    The risks, benefits, alternatives, and potential complications of the procedure, including the risks of bleeding, infection, injury to surrounding structures, need for additional procedures, and death were explained to the patient. All questions were answered. The patient understands and agrees to proceed with the procedure.        Discussed with Dr. Asia Rodriguez     Electronically signed by Rogers Ross DO on 6/22/22 at 11:17 AM EDT

## 2022-06-22 NOTE — PROGRESS NOTES
[facility]  Progress Note      Mr. Katherine Colindresving, Ms. Kinney is brother was updated about the patient. All of the brother's questions and concerns were answered appropriately.        Ezequiel Davis MD  Family Medicine [id number]

## 2022-06-22 NOTE — FLOWSHEET NOTE
06/22/22 1111   Vital Signs   BP (!) 138/106   Temp 97.9 °F (36.6 °C)   Heart Rate 57   Resp 20   Weight 140 lb 3.4 oz (63.6 kg)   Weight Method Bed scale   Percent Weight Change -2.6   Pain Assessment   Pain Assessment None - Denies Pain   Post-Hemodialysis Assessment   Post-Treatment Procedures Blood returned; Access bleeding time < 10 minutes   Machine Disinfection Process Exterior Machine Disinfection   Total Liters Processed (l/min) 68.6 l/min   Dialyzer Clearance Lightly streaked   Duration of Treatment (minutes) 180 minutes   Hemodialysis Intake (ml) 500 ml   Hemodialysis Output (ml) 2200 ml   NET Removed (ml) 1700   Tolerated Treatment Fair   Interventions Taken Fluid bolus   Patient Response to Treatment Report to Masha Price RN   Bilateral Breath Sounds Rhonchi;Diminished   Edema None   Edema Generalized None   RUE Edema None   LUE Edema None   RLE Edema None   LLE Edema None   Time Off 1056   Patient Disposition Return to room

## 2022-06-22 NOTE — PROGRESS NOTES
P Quality Flow/Interdisciplinary Rounds Progress Note        Quality Flow Rounds held on June 22, 2022    Disciplines Attending:  Bedside Nurse, ,  and Nursing Unit Leadership    Yudi Guadalupe was admitted on 6/15/2022  6:13 PM    Anticipated Discharge Date:       Disposition:    Minor Score:  Minor Scale Score: 17    Readmission Risk              Risk of Unplanned Readmission:  37           Discussed patient goal for the day, patient clinical progression, and barriers to discharge. The following Goal(s) of the Day/Commitment(s) have been identified:  Check IV line status/ antibiotics?       Syed Shanks RN  June 22, 2022

## 2022-06-22 NOTE — PROGRESS NOTES
[address] [place] [address] [facility]  [facility]  Progress Note    SUBJECTIVE:  Chief Complaint   Patient presents with    Shortness of Breath     for a few days per pt    Altered Mental Status     per NH staff, pt able to answer all orientation questions     Patient is tolerating medications. No reported adverse drug reactions. No nausea, vomiting, diarrhea. --stools are loose   Patient just returned from dialysis  In a great amount of back pain, states she can't get comfortable and waiting for her pain patch  Says her breathing has improved and she still coughs up some sputum  No fevers    Review of systems:  As stated above in the chief complaint, otherwise negative.     Medications:  Scheduled Meds:   lidocaine  1 patch TransDERmal Daily    alteplase  2 mg IntraCATHeter Once    ipratropium-albuterol  1 ampule Inhalation 4x daily    doxycycline hyclate  100 mg Oral 2 times per day    bumetanide  2 mg Oral Once per day on Tue Thu Sat    isosorbide mononitrate  30 mg Oral Daily    latanoprost  1 drop Right Eye Nightly    metoprolol succinate  25 mg Oral Daily    pantoprazole  20 mg Oral QAM AC    ticagrelor  90 mg Oral BID    sodium chloride flush  5-40 mL IntraVENous 2 times per day    [Held by provider] lanthanum  1,000 mg Oral TID WC    heparin (porcine)  5,000 Units SubCUTAneous TID    piperacillin-tazobactam  4,500 mg IntraVENous Q12H    brimonidine  1 drop Right Eye BID    timolol  1 drop Right Eye BID    aspirin  81 mg Oral Daily     Continuous Infusions:   sodium chloride 25 mL (06/19/22 2148)    dextrose       PRN Meds:HYDROcodone-acetaminophen, guaiFENesin, midodrine, sodium chloride flush, sodium chloride, ondansetron **OR** ondansetron, polyethylene glycol, acetaminophen **OR** acetaminophen, glucose, dextrose bolus **OR** dextrose bolus, glucagon (rDNA), dextrose    OBJECTIVE:  BP (!) 93/43   Pulse (!) 45   Temp 99.4 °F (37.4 °C)   Resp 20   Ht 5' 10\" (1.778 m)   Wt 143 lb 15.4 oz (65.3 kg) SpO2 99%   BMI 20.66 kg/m²   Temp  Av.5 °F (36.9 °C)  Min: 97.6 °F (36.4 °C)  Max: 99.4 °F (37.4 °C)  Constitutional: The patient is awake, alert, and oriented. Laying in bed on left side   Skin: Warm and dry. No rashes were noted. HEENT: Round and reactive pupils. Moist mucous membranes. No ulcerations or thrush. Neck: Supple to movements. Chest: No use of accessory muscles to breathe. Symmetrical expansion. No wheezing, crackles. R mastectomy. Clear throughout. Cardiovascular: S1 and S2 are rhythmic and regular. No murmurs appreciated. Abdomen: Positive bowel sounds to auscultation. Benign to palpation. No masses felt. Extremities lymphedema  postmastectomy on the right.  Left arm AV fistula   Lines:  R chest Mediport - de-accessed due to exposure (see image)      Laboratory and Tests Review:  Lab Results   Component Value Date    WBC 5.0 2022    WBC 4.3 (L) 2022    WBC 5.2 2022    HGB 10.9 (L) 2022    HCT 34.4 2022    MCV 92.2 2022     2022     Lab Results   Component Value Date    NEUTROABS 2.97 2022    NEUTROABS 2.59 2022    NEUTROABS 3.04 2022     Lab Results   Component Value Date    CRPHS 0.7 2016    CRPHS 2.6 2016    CRPHS 7.6 (H) 2015     Lab Results   Component Value Date    ALT 16 06/15/2022    AST 19 06/15/2022    ALKPHOS 155 (H) 06/15/2022    BILITOT 0.7 06/15/2022     Lab Results   Component Value Date     2022    K 3.6 2022    CL 99 2022    CO2 25 2022    BUN 15 2022    CREATININE 3.2 2022    CREATININE 3.9 2022    CREATININE 5.2 2022    GFRAA 18 2022    LABGLOM 18 2022    GLUCOSE 116 2022    GLUCOSE 46 2012    PROT 6.7 06/15/2022    LABALBU 3.5 06/15/2022    LABALBU 3.8 2012    CALCIUM 9.8 2022    BILITOT 0.7 06/15/2022    ALKPHOS 155 06/15/2022    AST 19 06/15/2022    ALT 16 06/15/2022     Lab Results

## 2022-06-22 NOTE — PROGRESS NOTES
Attempted ongoing Speech-Language Pathology intervention for dysphagia. Pt unavailable at this time due to:  [] HOLD per RN/ medical staff d/t medical status   [x] Off unit for testing/ procedure-dialysis   [] With medical staff   [] Declined intervention  [] Sleeping/ Lethargic   [] Other:     SLP to continue previously established POC and re-attempt as able. Delmi Lutz [id number]  Speech Language Pathologist

## 2022-06-22 NOTE — PROGRESS NOTES
established POC. Delbert BROWNE  [id number] [facility] [id number]  Speech Language Pathologist          CPT code(s) 10297  dysphagia tx  Total minutes :  30 minutes

## 2022-06-22 NOTE — PROGRESS NOTES
Comprehensive Nutrition Assessment    Type and Reason for Visit:  Initial,RD Nutrition Re-Screen/LOS    Nutrition Recommendations/Plan:   1. Continue Diet. Will Start ONS and monitor. If minimal intake remains x next few days, would then recommend to consider EN support. If Needed;  TF Recommendations:  Renal (Nepro) @ 18OY/GX (Goal) Cyclic x 37OJ/Q (hold 1hr pre, 2hr post Vibramycin BID) + 1 Protein Modular Daily= 900ml TV, 1620kcal, 73gm Pro, (1724kcal, 99gm Pro w/ Pro Mod), 653ml free water. Flush per renal mgmt. Malnutrition Assessment:  Malnutrition Status: At risk for malnutrition (Comment) (06/22/22 1211)    Context:  Acute Illness     Findings of the 6 clinical characteristics of malnutrition:  Energy Intake:  50% or less of estimated energy requirements for 5 or more days  Weight Loss:  Unable to assess (2/2 fluid shifts)     Body Fat Loss:  Unable to assess (2/2 ESTHER for HD at time of assessment)     Muscle Mass Loss:  Unable to assess    Fluid Accumulation:  No significant fluid accumulation     Strength:  Not Performed    Nutrition Assessment:    Pt adm from SNF w/ AMS/SOB x ~3d pta. 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 Adm w/ +HCAP vs Asp PNA and noted Dysphagia s/p MBS 6/16. Pt at nutritional risk d/t poor stephanie/intake since adm w/ abd pain/diarrhea (C-diff R/O) as well as w/ increased needs 2/2 ESRD/HD losses. Will Start ONS, provide EN recs if needed and monitor.     Nutrition Related Findings:    A&O, confused at times, dentition WNL, tender/non-distended Abd, +BS, +diarrhea, no edema, -I/O's Wound Type: None (healed finger amp site noted)       Current Nutrition Intake & Therapies:    Average Meal Intake: Refusing to eat,1-25% (limited intake data per doc flow since adm; refusing meals at times Behavioral-Environmental Outcomes: None Identified  Food/Nutrient Intake Outcomes: Food and Nutrient Intake,Supplement Intake  Physical Signs/Symptoms Outcomes: Biochemical Data,Diarrhea,GI Status,Fluid Status or Edema,Hemodynamic Status,Nutrition Focused Physical Findings,Skin,Weight    Discharge Planning:     Too soon to determine     Eduard Braden RD, LD  Contact: ext [phone]

## 2022-06-23 LAB
METER GLUCOSE: 112 MG/DL (ref 74–99)
METER GLUCOSE: 138 MG/DL (ref 74–99)
METER GLUCOSE: 152 MG/DL (ref 74–99)
METER GLUCOSE: 81 MG/DL (ref 74–99)

## 2022-06-23 PROCEDURE — 6370000000 HC RX 637 (ALT 250 FOR IP): Performed by: SPECIALIST

## 2022-06-23 PROCEDURE — 6370000000 HC RX 637 (ALT 250 FOR IP)

## 2022-06-23 PROCEDURE — 6360000002 HC RX W HCPCS: Performed by: FAMILY MEDICINE

## 2022-06-23 PROCEDURE — 6370000000 HC RX 637 (ALT 250 FOR IP): Performed by: FAMILY MEDICINE

## 2022-06-23 PROCEDURE — 1200000000 HC SEMI PRIVATE

## 2022-06-23 PROCEDURE — 97110 THERAPEUTIC EXERCISES: CPT

## 2022-06-23 PROCEDURE — 97530 THERAPEUTIC ACTIVITIES: CPT

## 2022-06-23 PROCEDURE — 94668 MNPJ CHEST WALL SBSQ: CPT

## 2022-06-23 PROCEDURE — 97535 SELF CARE MNGMENT TRAINING: CPT

## 2022-06-23 PROCEDURE — 94640 AIRWAY INHALATION TREATMENT: CPT

## 2022-06-23 PROCEDURE — 99232 SBSQ HOSP IP/OBS MODERATE 35: CPT | Performed by: FAMILY MEDICINE

## 2022-06-23 PROCEDURE — 82962 GLUCOSE BLOOD TEST: CPT

## 2022-06-23 PROCEDURE — 92526 ORAL FUNCTION THERAPY: CPT

## 2022-06-23 RX ADMIN — METOPROLOL SUCCINATE 25 MG: 25 TABLET, FILM COATED, EXTENDED RELEASE ORAL at 08:44

## 2022-06-23 RX ADMIN — HEPARIN SODIUM 5000 UNITS: 10000 INJECTION INTRAVENOUS; SUBCUTANEOUS at 09:43

## 2022-06-23 RX ADMIN — GABAPENTIN 200 MG: 100 CAPSULE ORAL at 20:36

## 2022-06-23 RX ADMIN — HEPARIN SODIUM 5000 UNITS: 10000 INJECTION INTRAVENOUS; SUBCUTANEOUS at 20:39

## 2022-06-23 RX ADMIN — LATANOPROST 1 DROP: 50 SOLUTION OPHTHALMIC at 20:36

## 2022-06-23 RX ADMIN — HYDROCODONE BITARTRATE AND ACETAMINOPHEN 1 TABLET: 7.5; 325 TABLET ORAL at 08:44

## 2022-06-23 RX ADMIN — BRIMONIDINE TARTRATE 1 DROP: 2 SOLUTION/ DROPS OPHTHALMIC at 20:36

## 2022-06-23 RX ADMIN — TIMOLOL MALEATE 1 DROP: 5 SOLUTION OPHTHALMIC at 20:36

## 2022-06-23 RX ADMIN — BUMETANIDE 2 MG: 1 TABLET ORAL at 08:43

## 2022-06-23 RX ADMIN — GABAPENTIN 200 MG: 100 CAPSULE ORAL at 15:03

## 2022-06-23 RX ADMIN — IPRATROPIUM BROMIDE AND ALBUTEROL SULFATE 1 AMPULE: .5; 2.5 SOLUTION RESPIRATORY (INHALATION) at 21:19

## 2022-06-23 RX ADMIN — ASPIRIN 81 MG CHEWABLE TABLET 81 MG: 81 TABLET CHEWABLE at 08:44

## 2022-06-23 RX ADMIN — IPRATROPIUM BROMIDE AND ALBUTEROL SULFATE 1 AMPULE: .5; 2.5 SOLUTION RESPIRATORY (INHALATION) at 16:08

## 2022-06-23 RX ADMIN — ISOSORBIDE MONONITRATE 30 MG: 30 TABLET, EXTENDED RELEASE ORAL at 08:45

## 2022-06-23 RX ADMIN — BRIMONIDINE TARTRATE 1 DROP: 2 SOLUTION/ DROPS OPHTHALMIC at 08:44

## 2022-06-23 RX ADMIN — IPRATROPIUM BROMIDE AND ALBUTEROL SULFATE 1 AMPULE: .5; 2.5 SOLUTION RESPIRATORY (INHALATION) at 08:35

## 2022-06-23 RX ADMIN — Medication 1 CAPSULE: at 08:43

## 2022-06-23 RX ADMIN — GABAPENTIN 200 MG: 100 CAPSULE ORAL at 08:43

## 2022-06-23 RX ADMIN — IPRATROPIUM BROMIDE AND ALBUTEROL SULFATE 1 AMPULE: .5; 2.5 SOLUTION RESPIRATORY (INHALATION) at 12:22

## 2022-06-23 RX ADMIN — PANTOPRAZOLE SODIUM 20 MG: 20 TABLET, DELAYED RELEASE ORAL at 08:43

## 2022-06-23 RX ADMIN — AMOXICILLIN AND CLAVULANATE POTASSIUM 1200 MG: 600; 42.9 POWDER, FOR SUSPENSION ORAL at 08:45

## 2022-06-23 RX ADMIN — TIMOLOL MALEATE 1 DROP: 5 SOLUTION OPHTHALMIC at 08:44

## 2022-06-23 RX ADMIN — HYDROCODONE BITARTRATE AND ACETAMINOPHEN 1 TABLET: 7.5; 325 TABLET ORAL at 15:03

## 2022-06-23 RX ADMIN — AMOXICILLIN AND CLAVULANATE POTASSIUM 1200 MG: 600; 42.9 POWDER, FOR SUSPENSION ORAL at 20:36

## 2022-06-23 RX ADMIN — HEPARIN SODIUM 5000 UNITS: 10000 INJECTION INTRAVENOUS; SUBCUTANEOUS at 14:23

## 2022-06-23 RX ADMIN — DOXYCYCLINE HYCLATE 100 MG: 100 CAPSULE ORAL at 08:45

## 2022-06-23 RX ADMIN — DOXYCYCLINE HYCLATE 100 MG: 100 CAPSULE ORAL at 20:36

## 2022-06-23 ASSESSMENT — PAIN SCALES - GENERAL
PAINLEVEL_OUTOF10: 8

## 2022-06-23 NOTE — CARE COORDINATION
Social Work / Discharge Planning : Patient to have port replaced on Monday ( Earliest due to having to hold Dick or Broa). Provider asking if [facility] CAN take patient back this weekend and then have her come back to hospital for this as outpatient procedure. Maria Dolores from [place] did state she didn't think this would be an issue. However, patient needs dosed with 2 antibiotics IV prior to procedure and not sure how that would work per provider. . SW to follow.  Electronically signed by YRN Levi on 6/23/22 at 1:57 PM EDT

## 2022-06-23 NOTE — PLAN OF CARE
Problem: Skin/Tissue Integrity  Goal: Absence of new skin breakdown  Description: 1. Monitor for areas of redness and/or skin breakdown  2. Assess vascular access sites hourly  3. Every 4-6 hours minimum:  Change oxygen saturation probe site  4. Every 4-6 hours:  If on nasal continuous positive airway pressure, respiratory therapy assess nares and determine need for appliance change or resting period.   Outcome: Progressing     Problem: Safety - Adult  Goal: Free from fall injury  Outcome: Progressing  Flowsheets (Taken 6/22/2022 2039)  Free From Fall Injury: Instruct family/caregiver on patient safety     Problem: ABCDS Injury Assessment  Goal: Absence of physical injury  Outcome: Progressing  Flowsheets (Taken 6/22/2022 2039)  Absence of Physical Injury: Implement safety measures based on patient assessment     Problem: Chronic Conditions and Co-morbidities  Goal: Patient's chronic conditions and co-morbidity symptoms are monitored and maintained or improved  Outcome: Progressing     Problem: Pain  Goal: Verbalizes/displays adequate comfort level or baseline comfort level  Outcome: Progressing     Problem: Nutrition Deficit:  Goal: Optimize nutritional status  Outcome: Progressing

## 2022-06-23 NOTE — PROGRESS NOTES
Occupational Therapy  OT BEDSIDE TREATMENT NOTE      Date:2022  Patient Name: Niranjan Retana  MRN: [id number]  : 1955  Room: [address] [place] [address]         Evaluating OT: Negrita Gonsalves OTR/L   [id number]       Referring Diallo Schwartz MD    Specific Provider Orders/Date:OT eval and treat 2022       Diagnosis:  AMS (altered mental status) [R41.82]     Pertinent Medical History: CHF, diabetic retinopathy, dialysis, R great to amputation,vitreours hemorrhage L eye       Precautions:  Fall Risk, isolation       Assessment of current deficits    [x]? Functional mobility            [x]?ADLs           [x]? Strength                  []?Cognition    [x]? Functional transfers          [x]? IADLs         [x]? Safety Awareness   [x]? Endurance    []? Fine Coordination                         [x]? Balance      []? Vision/perception   []? Sensation      []? Gross Motor Coordination             []? ROM           []? Delirium                   []? Motor Control      OT PLAN OF CARE   OT POC based on physician orders, patient diagnosis and results of clinical assessment     Frequency/Duration  2-4 days/wk for 2 weeks PRN   Specific OT Treatment Interventions to include:   ADL retraining/adapted techniques and AE recommendations to increase functional independence within precautions                    Energy conservation techniques to improve tolerance for selfcare routine   Functional transfer/mobility training/DME recommendations for increased independence, safety and fall prevention         Patient/family education to increase safety and functional independence             Environmental modifications for safe mobility and completion of ADLs                             Therapeutic activity to improve functional performance during ADLs.                                          Therapeutic exercise to improve tolerance and functional strength for ADLs    Balance retraining/tolerance tasks for facilitation of postural control with dynamic challenges during ADLs .       Positioning to improve functional independence  []?         Recommended Adaptive Equipment: TBD      SOCIAL;  Patient admitted from [place] [address]. Ambulating with walker   Prior - patient report she lived at home, was Independent and active     Pain Level: back pain   Cognition: Awake and alert. Cues for safety. Functional Assessment:  AM-PAC Daily Activity Raw Score: 15/24    Initial Eval Status  Date: 6/21/22 Treatment Status  Date:6/23/22  STGs = LTGs  Time frame: 10-14 days   Feeding Independent        Grooming Min A,seated   Decrease standing tolerance  Min A seated. Limited tolerance for upright sitting due to complaint of back pain.   Supervision    UB Dressing MiN A  [facility]   min A to change gown. Supervision    LB Dressing Mod A  mod A to thread clothing over feet. Min A   Bathing Mod A   min A UB bathe  Mod A LB bathe  Bathing completed while seated on the side of the bed. Min A   Toileting Patient incontinent   - she reports she is having frequent bowel moves when she just moves in bed      Patient assisted with hygiene,clean gown and linens   incontinent of bowel in the bed. Max A for thorough emma hygiene while standing briefly. Min A    Bed Mobility  Min A  Supine <>sit   SBA- rolling Min A   Supervision    Functional Transfers Patient sat EOB only - upon sitting  - patient c/o increase back pain   CGA to stand from bed. SBA    Functional Mobility NT  CGA side step to the [facility] using w/w for support.   SBA with good tolerance    Balance Sitting:     Static:  SBA- EOB     Dynamic:Mod A   Standing: NT    Supervision - sitting   SSBA- standing    Activity Tolerance Pain limiting tolerance  Limited.   Good  with ADL activity        Comments:  Pt with poor tolerance for activity due to complaint of back pain. While seated during ADL, pt leaning to the side or down with elbows on knees. Assist for ADL.   Linens changed while pt stood briefly using walker for support. Returned to bed at the end of the session. Education/treatment:  ADL retraining with facilitation of movement to increase self care skills. Therapeutic activity to address balance and endurance for ADL and transfers. Pt education of walker safety and transfer safety. · Pt has made  progress towards set goals.        Time In: 8:48  Time Out: 9:15      Min Units   Therapeutic Ex [id number]     Therapeutic Activities [id number]    ADL/Self Care 39086 20 2   Orthotic Management [id number]     Neuro Re-Ed [id number]     Non-Billable Time     TOTAL TIMED TREATMENT [address] [facility] ROSSANA/L [id number]

## 2022-06-23 NOTE — PROGRESS NOTES
GENERAL SURGERY  DAILY PROGRESS NOTE  6/23/2022    Subjective:  No new complaints or overnight events. No now concerns regarding exposed port    Objective:  /71   Pulse 88   Temp 97.8 °F (36.6 °C) (Oral)   Resp 18   Ht 5' 10\" (1.778 m)   Wt 141 lb 8.6 oz (64.2 kg)   SpO2 99%   BMI 20.31 kg/m²     General Appearance:  awake, alert, oriented, uncomfortable  Skin:  Skin color, texture, turgor normal. No rashes or lesions. Head/face:  NCAT  Eyes:  No gross abnormalities. Sclera nonicteric  Lungs/Chest:  Normal expansion. No respiratory distress. On room air. PowerPort on right chest wall, inferior metal edge exposed, no erythema or drainage. See picture below  Heart: Warm throughout. Regular rate   Abdomen:  Soft, no tenderness, non distended. No palpable organomegaly or masses. Extremities: Extremities warm to touch. LUE fistula with palpable thrill      Assessment/Plan:  78 y.o. female with exposed PowerPort.   Admitted for treatment of pneumonia and osteomyelitis     PLAN:  · Hold isaac Jaimes to continue aspirin  · Port removal 6/27, can be done as outpatient procedure but would need adequate notice for authorization  · Antibiotics per ID  · Keep port site covered with gauze until removed      Electronically signed by Ashanti Whitman DO on 6/23/2022 at 1:38 PM

## 2022-06-23 NOTE — PROGRESS NOTES
[facility]  Nephrology Progress Note    Patient's Name: Shin Hernández      History of Present Illness-full consult deferred as pt followed longitudinally at dialysis:    \"Claudia Avilez is a 78 y.o. female with a past medical history of breast cancer, coronary artery disease, hypertension, hyperlipidemia, and anemia. She presented to the [address] [facility] ED on 5/2 with complaints of back and abdominal pain patient was subsequently transferred to [facility] for NSTEMI. Patient is known to our service and dialyzes at [address] MWF 1st shift left AV fistula. In April she was Dx with DOMINICK zimmerman and is treated with iv Vanc  And has a mediport in the R  Pt is now admitted from the ED for cough and hypoxia. She came to the ED after having ongoing pain in the RLE when sitting, no significant pain when standing or using her walker. She notes since 6 days ago she had a cough productive of grey sputum. Resp Viral Panel (+) for SARS-CoV-2\"    6/23/22- Alert, resting in bed. Feels much better today, abd pain less. No nausea, vomiting, shortness of breath. PMH:    Past Medical History:   Diagnosis Date    Acute infection of bone ([address]     infection of rt foot, resolved.     Acute osteomyelitis of phalanx of left hand ([address] 1/27/2022    Left third distal phalanx    Anemia of chronic disease     Arthritis     Breast cancer ([address]     right breast, 2008/ bladder, 2006- last chemotherapy \"years ago\"    CAD (coronary artery disease)     Carpal tunnel syndrome     bilat - for OR left hand 3-17-20     Chronic diastolic CHF (congestive heart failure) ([address] 09/23/2014 9/23/14- echocardiogram revealed moderate LV concentric hypertrophy, stage III diastolic dysfunction, mild tricuspid regurgitation    CKD (chronic kidney disease) stage 4, GFR 15-29 ml/min ([facility])     Diabetic retinopathy ([address]     Glaucoma     Hemodialysis patient ([address]     [facility]  mon wed fri- Dr. Roro Graves - left arm fistula     Hyperkalemia, diminished renal excretion 11/9/2017    Hyperlipidemia     Hypertension     Hypoglycemia unawareness in type 1 diabetes mellitus ([address] 11/7/2017    Insulin dependent type 2 diabetes mellitus (HCC)     Neuropathy     feet    Osteomyelitis due to secondary diabetes ([address]     rt great toe with amputation    Patient is Advent 11/7/2017    Refusal of blood product     patient states she dose not take blood transfusion    Ventricular hypertrophy     Ventricular tachycardia ([address] 5/24/2021    Vitreous hemorrhage (HCC)     left eye     Patient Active Problem List   Diagnosis    Diabetic retinopathy ([address]    Malignant neoplasm of right female breast ([address]    Atherosclerosis of native coronary artery of native heart without angina pectoris    Moderate obesity    Left ventricular hypertrophy    Herniated lumbar intervertebral disc    Lumbar degenerative disc disease    Pseudomeningocele    Lumbar radiculopathy    Lymphedema of arm    Anemia of chronic disease    Chronic diastolic CHF (congestive heart failure) ([address]    Hypertension    Glaucoma    Refusal of blood product    Elevated troponin    Controlled type 2 diabetes mellitus with chronic kidney disease on chronic dialysis, with long-term current use of insulin (HCC)    Vitreous hemorrhage ([address]    Patient is Advent    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 ([address]    Chronic pain syndrome    Lumbar post-laminectomy syndrome    Cervicalgia    Diabetic peripheral neuropathy ([address]    ESRD (end stage renal disease) ([address]    Bilateral carpal tunnel syndrome    Cardiac arrest ([address]    ESRD (end stage renal disease) on dialysis ([address]    Mixed hyperlipidemia    Lymphedema of right upper extremity    Coronary artery disease involving native coronary artery of native heart with angina pectoris ([address]    Mitral valve disease    CAD in native artery    Lumbar stenosis without neurogenic claudication    Intractable low back pain    Unable to ambulate    NSTEMI (non-ST elevated myocardial infarction) (HCC)    Moderate protein-calorie malnutrition (HCC)    AMS (altered mental status)    Ischemic cardiomyopathy    Healthcare-associated pneumonia    Hypoxia    Acute respiratory failure with hypoxia (HCC)    Lower abdominal pain    Pain     Meds:     lidocaine  1 patch TransDERmal Daily    amoxicillin-clavulanate  1,200 mg Oral 2 times per day    lactobacillus  1 capsule Oral Daily    gabapentin  200 mg Oral TID    alteplase  2 mg IntraCATHeter Once    ipratropium-albuterol  1 ampule Inhalation 4x daily    doxycycline hyclate  100 mg Oral 2 times per day    bumetanide  2 mg Oral Once per day on Tue Thu Sat    isosorbide mononitrate  30 mg Oral Daily    latanoprost  1 drop Right Eye Nightly    metoprolol succinate  25 mg Oral Daily    pantoprazole  20 mg Oral QAM AC    [Held by provider] ticagrelor  90 mg Oral BID    sodium chloride flush  5-40 mL IntraVENous 2 times per day    [Held by provider] lanthanum  1,000 mg Oral TID WC    heparin (porcine)  5,000 Units SubCUTAneous TID    brimonidine  1 drop Right Eye BID    timolol  1 drop Right Eye BID    aspirin  81 mg Oral Daily      sodium chloride 25 mL (06/19/22 4993)    dextrose       Meds prn:     HYDROcodone-acetaminophen **OR** HYDROcodone 5 mg - acetaminophen, guaiFENesin, midodrine, sodium chloride flush, sodium chloride, ondansetron **OR** ondansetron, polyethylene glycol, acetaminophen **OR** acetaminophen, glucose, dextrose bolus **OR** dextrose bolus, glucagon (rDNA), dextrose    Meds prior to admission:     No current facility-administered medications on file prior to encounter. Current Outpatient Medications on File Prior to Encounter   Medication Sig Dispense Refill    gabapentin (NEURONTIN) 100 MG capsule Take 2 capsules by mouth 3 times daily for 180 days.  180 capsule 5    atorvastatin (LIPITOR) 80 MG tablet Take 80 mg by mouth nightly      bumetanide (BUMEX) 2 MG tablet Take 2 mg by mouth three times a week Given Sunday,Tuesday,Thursday      isosorbide mononitrate (IMDUR) 30 MG extended release tablet Take 30 mg by mouth daily      insulin glargine (LANTUS) 100 UNIT/ML injection vial Inject 5 Units into the skin nightly       metoprolol succinate (TOPROL XL) 25 MG extended release tablet Take 1 tablet by mouth daily 90 tablet 1    lanthanum (FOSRENOL) 1000 MG chewable tablet Take 1,000 mg by mouth 3 times daily (with meals)       allopurinol (ZYLOPRIM) 100 MG tablet Take 1 tablet by mouth daily 90 tablet 1    ticagrelor (BRILINTA) 90 MG TABS tablet Take 1 tablet by mouth 2 times daily 180 tablet 1    B Complex-C-Folic Acid (BONI CAPS) 1 MG CAPS Take 1 mg by mouth nightly       omeprazole (PRILOSEC) 20 MG delayed release capsule Take 20 mg by mouth daily as needed       LUMIGAN 0.01 % SOLN ophthalmic drops Place 1 drop into the right eye nightly       COMBIGAN 0.2-0.5 % ophthalmic solution Place 1 drop into the right eye every 12 hours   7    aspirin 81 MG EC tablet Take 1 tablet by mouth daily 30 tablet 0    midodrine (PROAMATINE) 5 MG tablet Take 5 mg by mouth daily as needed      lidocaine-prilocaine (EMLA) 2.5-2.5 % cream Apply topically as needed for Pain        Allergies:    Furosemide    Social History:     reports that she has never smoked. She has never used smokeless tobacco. She reports that she does not drink alcohol and does not use drugs.     Family History:         Problem Relation Age of Onset   Lisha Marques Breast Cancer Mother 61    Hypertension Mother     Heart Disease Father     Prostate Cancer Father     Breast Cancer Maternal Grandmother 61     Physical Exam:    Patient Vitals for the past 24 hrs:   BP Temp Temp src Pulse Resp SpO2 Weight   06/23/22 0914 -- -- -- -- 18 -- --   06/23/22 0842 131/71 -- -- 75 18 100 % --   06/23/22 0835 -- -- -- 82 18 96 % --   06/23/22 0602 (!) 100/54 97.4 °F (36.3 °C) Oral 71 16 95 % --   06/23/22 0443 -- -- -- -- -- -- 141 lb 8.6 oz (64.2 kg)   06/22/22 2039 (!) 109/47 98.5 °F (36.9 °C) Oral 80 17 98 % --   06/22/22 2005 -- -- -- -- 17 -- --   06/22/22 1935 -- -- -- -- 18 -- --   06/22/22 1647 (!) 121/50 97.7 °F (36.5 °C) Oral 88 18 100 % --   06/22/22 1350 -- -- -- -- 18 -- --     No intake or output data in the 24 hours ending 06/23/22 1154     General: Awake, alert, no acute distress  Neck: No JVD noted  Lungs: diminished throughout, no adventitious sounds. Mediport in the R chest  CV: RRR, no rub  Abd: Soft, LRQ tenderness, nondistended, + bowel sounds  Skin: Warm and dry. No rash on exposed extremities  Ext: 1+ RUE edema (h/o breast CA); No BLE edema ; left AV fistula in the upper ext, +thrill, +bruit  Neuro: Alert & oriented, answers questions appropriately    Data:    Recent Labs     06/21/22  1255 06/22/22  0750   WBC 4.3* 5.0   HGB 10.2* 10.9*   HCT 33.0* 34.4   MCV 91.7 92.2    220     Recent Labs     06/21/22  1255 06/22/22  0750    138   K 3.8 3.6  3.6    99   CO2 25 25   CREATININE 3.9* 3.2*   BUN 18 15   LABGLOM 14 18   GLUCOSE 101* 116*   CALCIUM 9.3 9.8   PHOS 2.9 3.0   MG 2.0 2.0     Vit D, 25-Hydroxy   Date Value Ref Range Status   03/11/2022 28 (L) 30 - 100 ng/mL Final     Comment:     <20 ng/mL. ........... Yoana Gil Deficient  20-30 ng/mL. ......... Yoana Gull Insufficient   ng/mL. ........ Yoana Gull Sufficient  >100 ng/mL. .......... Yoana Gull Toxic       PTH   Date Value Ref Range Status   03/11/2022 241 (H) 15 - 65 pg/mL Final     No results for input(s): ALT, AST, ALKPHOS, BILITOT, BILIDIR in the last 72 hours. No results for input(s): LABALBU in the last 72 hours.   Ferritin   Date Value Ref Range Status   06/15/2022 2,221 ng/mL Final     Comment:     FERRITIN Reference Ranges:  Adult Males   20 - 60 years:    27 - 400 ng/mL  Adult females 16 - 61 years:    15 - 150 ng/mL  Adults greater than 60 years:   no established reference findings  General surgery consulted    Follow [facility] labs      GOYO Pereira - CNS   Patient seen and examined. I had a face to face encounter with the patient. She states there is minimal back pain today  Agree with exam.    Agree with  formulation, assessment and plan as outlined above and directed by me. Addition and corrections were done as deemed appropriate. My exam and plan include:     A/P:  1. ESKD-will plan for IHD tomorrow with reduced vol removal given the intradialytic hypotension during the last treatment    2.  Asp/Pne and Vertebral Osteomyelitis-changed to amoxicillin clavulanate with the doxycycline    Continue current treatment as outlined above    TRES Flores MD

## 2022-06-23 NOTE — PROGRESS NOTES
Spoke with [place] infection prevention. Patient states she had more than three watery BMs green in color in the last 24 hours that were not documented in the flowsheets.

## 2022-06-23 NOTE — PROGRESS NOTES
[address] [place] [address] [facility]  Progress Note    SUBJECTIVE:  Chief Complaint   Patient presents with    Shortness of Breath     for a few days per pt    Altered Mental Status     per NH staff, pt able to answer all orientation questions     Patient is tolerating medications. No reported adverse drug reactions. No nausea, vomiting, diarrhea. --stools are loose   Patient sitting on the edge of the bed with therapy  Still has some back pain, has her pain patch on   Breathing has improved and she still coughs up some greyish sputum  No fevers  Family at bedside  Review of systems:  As stated above in the chief complaint, otherwise negative.     Medications:  Scheduled Meds:   lidocaine  1 patch TransDERmal Daily    amoxicillin-clavulanate  1,200 mg Oral 2 times per day    lactobacillus  1 capsule Oral Daily    gabapentin  200 mg Oral TID    alteplase  2 mg IntraCATHeter Once    ipratropium-albuterol  1 ampule Inhalation 4x daily    doxycycline hyclate  100 mg Oral 2 times per day    bumetanide  2 mg Oral Once per day on Tue Thu Sat    isosorbide mononitrate  30 mg Oral Daily    latanoprost  1 drop Right Eye Nightly    metoprolol succinate  25 mg Oral Daily    pantoprazole  20 mg Oral QAM AC    [Held by provider] ticagrelor  90 mg Oral BID    sodium chloride flush  5-40 mL IntraVENous 2 times per day    [Held by provider] lanthanum  1,000 mg Oral TID WC    heparin (porcine)  5,000 Units SubCUTAneous TID    brimonidine  1 drop Right Eye BID    timolol  1 drop Right Eye BID    aspirin  81 mg Oral Daily     Continuous Infusions:   sodium chloride 25 mL (06/19/22 0398)    dextrose       PRN Meds:HYDROcodone-acetaminophen **OR** HYDROcodone 5 mg - acetaminophen, guaiFENesin, midodrine, sodium chloride flush, sodium chloride, ondansetron **OR** ondansetron, polyethylene glycol, acetaminophen **OR** acetaminophen, glucose, dextrose bolus **OR** dextrose bolus, glucagon (rDNA), dextrose    OBJECTIVE:  BP 9.8 06/22/2022    BILITOT 0.7 06/15/2022    ALKPHOS 155 06/15/2022    AST 19 06/15/2022    ALT 16 06/15/2022     Lab Results   Component Value Date    CRP 7.7 (H) 04/13/2022    CRP 0.7 (H) 01/20/2022    CRP 1.6 (H) 12/20/2021     Lab Results   Component Value Date    SEDRATE 30 (H) 06/15/2022    SEDRATE 81 (H) 04/13/2022    SEDRATE 19 01/20/2022     Radiology:  Reviewed    Microbiology:   Respiratory Culture 6/16/22: few PMNL, moderate GPC in pairs  Blood Cultures 6/15/22: negative   COVID-19 PCR positive on respiratory array  Stools: pending    ASSESSMENT:  Aspiration pneumonia pneumonia/HCAP- improved  Vertebral osteomyelitis and infected hardware lumbar spine-suppressive doxycycline ongoing; just finished 6 weeks of Vanco with hemodialysis- now on suppression c doxy  Respiratory viral panel positive for COVID by PCR; rapid test was negative but clinically CAT scan reflects bacterial pneumonia at this point I believe the COVID is asymptomatic. Mild leukopenia    PLAN:  Continue Augmentin ES 1200mg (renally adjusted) for 2 more days  Doxycycline suppression for Vertebral Osteo  For Mediport removal Monday  - plan to give antibiotics for planned mediport removal on call to the OR with dialysis treatment  Recommend IV team to get a new IV site   Speech following: video swallow completed-laryngeal penetration with thin liquids  Follow any evolution of COVID-19 only on room air  Labs and cultures reviewed     GOYO Cisneros - CNP  9:40 AM  6/23/2022   Pt seen and examined. Above discussed agree with advanced practice nurse. Labs, cultures, and radiographs reviewed. Face to Face encounter occurred. Changes made as necessary.      Frances Nichols MD

## 2022-06-23 NOTE — PROGRESS NOTES
Physical Therapy  Facility/Department: [address] INTERMEDIATE  Physical Therapy Treatment Note    Name: Annette Lea  : 1955  MRN: [id number]  Date of Service: 2022    Patient Diagnosis(es): The primary encounter diagnosis was Acute respiratory failure with hypoxia ([address] A diagnosis of Pain was also pertinent to this visit. Past Medical History:  has a past medical history of Acute infection of bone ([address] Acute osteomyelitis of phalanx of left hand ([address] Anemia of chronic disease, Arthritis, Breast cancer ([address] CAD (coronary artery disease), Carpal tunnel syndrome, Chronic diastolic CHF (congestive heart failure) ([address] CKD (chronic kidney disease) stage 4, GFR 15-29 ml/min ([address] Diabetic retinopathy ([address] Glaucoma, Hemodialysis patient ([address] Hyperkalemia, diminished renal excretion, Hyperlipidemia, Hypertension, Hypoglycemia unawareness in type 1 diabetes mellitus ([address] Insulin dependent type 2 diabetes mellitus ([address] Neuropathy, Osteomyelitis due to secondary diabetes [facility]), Patient is Yazdanism, Refusal of blood product, Ventricular hypertrophy, Ventricular tachycardia ([address] and Vitreous hemorrhage ([address] Past Surgical History:  has a past surgical history that includes Cholecystectomy; amputation; Mastectomy; Cystoscopy; Cataract removal; Ankle surgery; other surgical history (2011); ECHO Compl W Dop Color Flow (2013); Echo Complete (2013); spinal cord decompression; other surgical history (insertion lumbar drain insertion); other surgical history (10/22/2015); other surgical history (2015); Tunneled venous catheter placement (11/15/2017); Dialysis fistula creation (Left, 2018); vitrectomy (Left, 04/10/2018); pr rpr retinal dtchmnt w/vitrectomy any meth (Left, 04/10/2018); pr av anast,up arm basilic vein transposit (Left, 05/15/2018); pr ligatn angioaccess av fistula (Left, 2018); Colonoscopy;  Carpal tunnel release (Left, 2020); transesophageal echocardiogram (11/11/2021); Cardiac catheterization (12/09/2021); Finger amputation (Left, 01/20/2022); bone biopsy (N/A, 04/18/2022); transesophageal echocardiogram (04/19/2022); and Coronary angioplasty (05/05/2022). Referring provider:  Karen Jackson MD    PT Order:  PT eval and treat     Evaluating PT:  Linda Epperson PT, DPT PT [id number]    Room #:  1939/4533-E  Diagnosis:  AMS (altered mental status) [R41.82]  Precautions:  Fall risk, O2  Equipment Needs:  None. SUBJECTIVE:    Pt admitted from nursing facility. Pt reported she was ambulating with a walker and assist with PT.      OBJECTIVE:   Initial Evaluation  Date: 6/18 Treatment  6/23/2022 Short Term/ Long Term   Goals   Was pt agreeable to Eval/treatment? yes yes    Does pt have pain? Back and B LEs which increased with mobility. Back pain    Bed Mobility  Rolling: NT  Supine to sit: Min A  Sit to supine: SBA  Scooting: Min A to sitting EOB Rolling: SBA  Supine to sit: SBA  Scooting: SBA seated to EOB SBA   Transfers Sit to stand: Min A  Stand to sit: Min A  Stand pivot: NT Sit <>stand: Min A  Stand Pivot; Min A using [employer] for support SBA   Ambulation    10 feet with w/w Mod A  25 feet x 1 using [employer] for support Min A for balance 50 feet with w/w SBA    Stair negotiation: ascended and descended  NT  NT    ROM BLE:  WFL     Strength BLE:  Grossly 4-/5  Grossly 4/5   Balance Sitting EOB:  supervision  Dynamic Standing: Mod A with w/w  Sitting EOB:  independent  Dynamic Standing:  SBA with w/w   AM-PAC 6 Clicks 26/17 28/52        Pt is alert and able to follow instruction, states wants to get OOB, states her back feels better todsay.   Balance: poor during gait and pivot transfers using [employer] for support    Pt performed therapeutic exercise of the following: NT    Patient education  Pt was educated on UE usage to assist with transfers, upright posture during gait using [employer]    Patient response to education:   Pt verbalized understanding Pt demonstrated skill Pt requires further education in this area   yes With encouragement yes     ASSESSMENT:   Comments: Nurse ok with rx. Pt found in bed, agreed to rx. Pt assisted to EOB, sat EOB SBA. Gait performed in the room, jaime slow and inconsistent. Pt unsteady throughout, required constant hands on assist for balance and safety. Pt with no complaints of dizziness or shortness of breath. Pt sat EOB briefly, then pivoted to a bedside chair using [address]. Pt fatigued after activity. Treatment: Pt practiced and was instructed in the following treatment: transfer safety, gait mechanics    Pt was left in a bedside chair with call light in reach, waffle cushion in place  Chair/bed alarm: chair alarm active    Time in 1440   Time out 1455   Total Treatment Time 15 minutes   CPT codes:     Therapeutic activities 32712 15 minutes   Therapeutic exercises 24261 0 minutes       Pt is making fair progress toward established Physical Therapy goals. Continue with physical therapy current plan of care.     Elina Paulino [facility]   License Number: [id number]

## 2022-06-23 NOTE — PROGRESS NOTES
Spoke with Dr Melissa Mishra patient ok to have IV in R arm. Nursing to watch closely for swelling with administration of fluids. To consider for PICC tomorrow.

## 2022-06-23 NOTE — PROGRESS NOTES
SPEECH LANGUAGE PATHOLOGY  DAILY PROGRESS NOTE        PATIENT NAME:  Ernestina Strong      :  1955          TODAY'S DATE:  2022 ROOM:  [address] [place] [address]        SWALLOWING    Patient seen for follow up dysphagia management this date. Morning meal tray at bedside. Patient took small bites of regular consistency solid food and small sips of cranberry juice. No overt s/s of aspiration observed. Reviewed recommendation for no straws as well as compensatory strategies with patient. Patient had good carryover of compensatory strategies while consuming morning meal.     Reviewed laryngeal and TBR exercises. She completed laryngeal and TBR exercises with fair+ outcome, limited attempts due to lethargy. Encouraged to self complete when she's feeling less tired. Handout left at bedside. Will continue to follow. DYSPHAGIA DIAGNOSIS:  mild-moderate pharyngeal phase dysphagia-aspiration was not observed during study. However laryngeal penetration after the swallow was observed with consecutive sips of thin liquid per straw. Pt reports she has been eating/drinking lying down in bed due to back pain. This may increase her risk of aspiration.      Pt noted to have anterior bridging osteophytes possibly affecting pharyngeal swallow and increasing risk of penetration/aspiraiton.      DIET RECOMMENDATIONS:  Regular consistency solids (IDDSI level 7) with small sips of thin liquids per cup (IDDSI level 0) while upright-NO STRAWS    MEDICATION ADMINISTRATION: Per patient choice/nursing judgement     FEEDING RECOMMENDATIONS:               Assistance level:  No assistance needed                 Compensatory strategies recommended: Double swallow, Small bites/sips, Alternate solids and liquids and No straw        Education- Pt educated on results and POC. Pt trained on compensatory strategies for safe swallow with good outcome. Questions answered.                                         Will continue SP intervention as per

## 2022-06-23 NOTE — PROGRESS NOTES
P Quality Flow/Interdisciplinary Rounds Progress Note        Quality Flow Rounds held on June 23, 2022    Disciplines Attending:  Bedside Nurse,  and Nursing Unit Leadership    Geronimo Marino was admitted on 6/15/2022  6:13 PM    Anticipated Discharge Date:       Disposition:    Minor Score:  Minor Scale Score: 18    Readmission Risk              Risk of Unplanned Readmission:  39           Discussed patient goal for the day, patient clinical progression, and barriers to discharge. The following Goal(s) of the Day/Commitment(s) have been identified:  monitor BMsTanisha Murphy RN  June 23, 2022

## 2022-06-24 ENCOUNTER — ANESTHESIA EVENT (OUTPATIENT)
Dept: OPERATING ROOM | Age: 67
DRG: 177 | End: 2022-06-24
Payer: COMMERCIAL

## 2022-06-24 LAB
ANION GAP SERPL CALCULATED.3IONS-SCNC: 14 MMOL/L (ref 7–16)
BASOPHILS ABSOLUTE: 0.04 E9/L (ref 0–0.2)
BASOPHILS RELATIVE PERCENT: 1.1 % (ref 0–2)
BUN BLDV-MCNC: 32 MG/DL (ref 6–23)
CALCIUM SERPL-MCNC: 9.3 MG/DL (ref 8.6–10.2)
CHLORIDE BLD-SCNC: 99 MMOL/L (ref 98–107)
CO2: 25 MMOL/L (ref 22–29)
CREAT SERPL-MCNC: 4.3 MG/DL (ref 0.5–1)
EOSINOPHILS ABSOLUTE: 0.09 E9/L (ref 0.05–0.5)
EOSINOPHILS RELATIVE PERCENT: 2.5 % (ref 0–6)
GFR AFRICAN AMERICAN: 12
GFR NON-AFRICAN AMERICAN: 12 ML/MIN/1.73
GLUCOSE BLD-MCNC: 150 MG/DL (ref 74–99)
HCT VFR BLD CALC: 36 % (ref 34–48)
HEMOGLOBIN: 11.4 G/DL (ref 11.5–15.5)
IMMATURE GRANULOCYTES #: 0.03 E9/L
IMMATURE GRANULOCYTES %: 0.8 % (ref 0–5)
LYMPHOCYTES ABSOLUTE: 1.26 E9/L (ref 1.5–4)
LYMPHOCYTES RELATIVE PERCENT: 34.4 % (ref 20–42)
MCH RBC QN AUTO: 28.8 PG (ref 26–35)
MCHC RBC AUTO-ENTMCNC: 31.7 % (ref 32–34.5)
MCV RBC AUTO: 90.9 FL (ref 80–99.9)
METER GLUCOSE: 132 MG/DL (ref 74–99)
METER GLUCOSE: 138 MG/DL (ref 74–99)
METER GLUCOSE: 154 MG/DL (ref 74–99)
MONOCYTES ABSOLUTE: 0.63 E9/L (ref 0.1–0.95)
MONOCYTES RELATIVE PERCENT: 17.2 % (ref 2–12)
NEUTROPHILS ABSOLUTE: 1.61 E9/L (ref 1.8–7.3)
NEUTROPHILS RELATIVE PERCENT: 44 % (ref 43–80)
PDW BLD-RTO: 18.6 FL (ref 11.5–15)
PLATELET # BLD: 225 E9/L (ref 130–450)
PMV BLD AUTO: 11.2 FL (ref 7–12)
POTASSIUM SERPL-SCNC: 4.1 MMOL/L (ref 3.5–5)
RBC # BLD: 3.96 E12/L (ref 3.5–5.5)
SODIUM BLD-SCNC: 138 MMOL/L (ref 132–146)
WBC # BLD: 3.7 E9/L (ref 4.5–11.5)

## 2022-06-24 PROCEDURE — 6370000000 HC RX 637 (ALT 250 FOR IP)

## 2022-06-24 PROCEDURE — 82962 GLUCOSE BLOOD TEST: CPT

## 2022-06-24 PROCEDURE — 6370000000 HC RX 637 (ALT 250 FOR IP): Performed by: SPECIALIST

## 2022-06-24 PROCEDURE — 90935 HEMODIALYSIS ONE EVALUATION: CPT

## 2022-06-24 PROCEDURE — 6370000000 HC RX 637 (ALT 250 FOR IP): Performed by: FAMILY MEDICINE

## 2022-06-24 PROCEDURE — 2580000003 HC RX 258: Performed by: FAMILY MEDICINE

## 2022-06-24 PROCEDURE — 6360000002 HC RX W HCPCS: Performed by: FAMILY MEDICINE

## 2022-06-24 PROCEDURE — 99231 SBSQ HOSP IP/OBS SF/LOW 25: CPT | Performed by: FAMILY MEDICINE

## 2022-06-24 PROCEDURE — 80048 BASIC METABOLIC PNL TOTAL CA: CPT

## 2022-06-24 PROCEDURE — 36415 COLL VENOUS BLD VENIPUNCTURE: CPT

## 2022-06-24 PROCEDURE — 1200000000 HC SEMI PRIVATE

## 2022-06-24 PROCEDURE — 94668 MNPJ CHEST WALL SBSQ: CPT

## 2022-06-24 PROCEDURE — 94640 AIRWAY INHALATION TREATMENT: CPT

## 2022-06-24 PROCEDURE — 85025 COMPLETE CBC W/AUTO DIFF WBC: CPT

## 2022-06-24 RX ADMIN — GABAPENTIN 200 MG: 100 CAPSULE ORAL at 21:50

## 2022-06-24 RX ADMIN — HEPARIN SODIUM 5000 UNITS: 10000 INJECTION INTRAVENOUS; SUBCUTANEOUS at 21:52

## 2022-06-24 RX ADMIN — TIMOLOL MALEATE 1 DROP: 5 SOLUTION OPHTHALMIC at 21:50

## 2022-06-24 RX ADMIN — SODIUM CHLORIDE, PRESERVATIVE FREE 10 ML: 5 INJECTION INTRAVENOUS at 14:03

## 2022-06-24 RX ADMIN — SODIUM CHLORIDE, PRESERVATIVE FREE 10 ML: 5 INJECTION INTRAVENOUS at 21:51

## 2022-06-24 RX ADMIN — PANTOPRAZOLE SODIUM 20 MG: 20 TABLET, DELAYED RELEASE ORAL at 06:49

## 2022-06-24 RX ADMIN — ASPIRIN 81 MG CHEWABLE TABLET 81 MG: 81 TABLET CHEWABLE at 14:02

## 2022-06-24 RX ADMIN — HYDROCODONE BITARTRATE AND ACETAMINOPHEN 1 TABLET: 5; 325 TABLET ORAL at 14:12

## 2022-06-24 RX ADMIN — LATANOPROST 1 DROP: 50 SOLUTION OPHTHALMIC at 21:50

## 2022-06-24 RX ADMIN — AMOXICILLIN AND CLAVULANATE POTASSIUM 1200 MG: 600; 42.9 POWDER, FOR SUSPENSION ORAL at 14:01

## 2022-06-24 RX ADMIN — HYDROCODONE BITARTRATE AND ACETAMINOPHEN 1 TABLET: 5; 325 TABLET ORAL at 06:48

## 2022-06-24 RX ADMIN — Medication 1 CAPSULE: at 14:02

## 2022-06-24 RX ADMIN — IPRATROPIUM BROMIDE AND ALBUTEROL SULFATE 1 AMPULE: .5; 2.5 SOLUTION RESPIRATORY (INHALATION) at 15:59

## 2022-06-24 RX ADMIN — IPRATROPIUM BROMIDE AND ALBUTEROL SULFATE 1 AMPULE: .5; 2.5 SOLUTION RESPIRATORY (INHALATION) at 19:43

## 2022-06-24 RX ADMIN — DOXYCYCLINE HYCLATE 100 MG: 100 CAPSULE ORAL at 14:02

## 2022-06-24 RX ADMIN — HYDROCODONE BITARTRATE AND ACETAMINOPHEN 1 TABLET: 5; 325 TABLET ORAL at 21:49

## 2022-06-24 RX ADMIN — METOPROLOL SUCCINATE 25 MG: 25 TABLET, FILM COATED, EXTENDED RELEASE ORAL at 14:02

## 2022-06-24 RX ADMIN — BRIMONIDINE TARTRATE 1 DROP: 2 SOLUTION/ DROPS OPHTHALMIC at 21:50

## 2022-06-24 RX ADMIN — GABAPENTIN 200 MG: 100 CAPSULE ORAL at 14:01

## 2022-06-24 RX ADMIN — TIMOLOL MALEATE 1 DROP: 5 SOLUTION OPHTHALMIC at 14:03

## 2022-06-24 RX ADMIN — ISOSORBIDE MONONITRATE 30 MG: 30 TABLET, EXTENDED RELEASE ORAL at 14:02

## 2022-06-24 RX ADMIN — HEPARIN SODIUM 5000 UNITS: 10000 INJECTION INTRAVENOUS; SUBCUTANEOUS at 14:09

## 2022-06-24 RX ADMIN — DOXYCYCLINE HYCLATE 100 MG: 100 CAPSULE ORAL at 21:50

## 2022-06-24 RX ADMIN — AMOXICILLIN AND CLAVULANATE POTASSIUM 1200 MG: 600; 42.9 POWDER, FOR SUSPENSION ORAL at 21:50

## 2022-06-24 RX ADMIN — BRIMONIDINE TARTRATE 1 DROP: 2 SOLUTION/ DROPS OPHTHALMIC at 14:03

## 2022-06-24 RX ADMIN — IPRATROPIUM BROMIDE AND ALBUTEROL SULFATE 1 AMPULE: .5; 2.5 SOLUTION RESPIRATORY (INHALATION) at 08:06

## 2022-06-24 ASSESSMENT — PAIN SCALES - GENERAL
PAINLEVEL_OUTOF10: 5
PAINLEVEL_OUTOF10: 6
PAINLEVEL_OUTOF10: 5

## 2022-06-24 ASSESSMENT — PAIN DESCRIPTION - LOCATION
LOCATION: BACK

## 2022-06-24 ASSESSMENT — PAIN DESCRIPTION - DESCRIPTORS: DESCRIPTORS: ACHING;DISCOMFORT

## 2022-06-24 ASSESSMENT — PAIN DESCRIPTION - ORIENTATION: ORIENTATION: LOWER

## 2022-06-24 NOTE — PROGRESS NOTES
[address] [address]  Progress Note    Chief complaint :  Chief Complaint   Patient presents with    Shortness of Breath     for a few days per pt    Altered Mental Status     per NH staff, pt able to answer all orientation questions       Subjective:  Patient sitting up eating breakfast comfortably. Patient states that she has been tolerating a diet denies having any abdominal discomfort, normal appetite and no nausea. Patient states that she had a surprise loose stool overnight. Patient states that after chest physiotherapy she does cough however she is feeling much better breathing and comfortable on room air. Patient denies having any fever, chills, shortness of breath, chest pain. Past medical, surgical, family and social history were reviewed, non-contributory, and unchanged unless otherwise stated. Objective:  BP (!) 126/57   Pulse 70   Temp 98.2 °F (36.8 °C) (Oral)   Resp 17   Ht 5' 10\" (1.778 m)   Wt 143 lb 4.8 oz (65 kg)   SpO2 96%   BMI 20.56 kg/m²   Vital signs reviewed     Physical Exam  Vitals and nursing note reviewed. Constitutional:       Appearance: Normal appearance. Comments: Sitting up in bed eating breakfast   HENT:      Head: Normocephalic and atraumatic. Nose: Nose normal. No congestion or rhinorrhea. Eyes:      General: No scleral icterus. Conjunctiva/sclera: Conjunctivae normal.   Neck:      Vascular: No carotid bruit. Cardiovascular:      Rate and Rhythm: Normal rate and regular rhythm. Heart sounds: Murmur (2/6 systolic ) heard. No gallop. Pulmonary:      Effort: Pulmonary effort is normal.      Breath sounds: Normal breath sounds. No wheezing or rales. Comments:     Chest:   Breasts:      Right: Absent. Comments: Surgical scar closed and intact  Abdominal:      General: Bowel sounds are normal. There is no distension. Palpations: Abdomen is soft. There is no mass. Tenderness:  There is abdominal tenderness (diffuse to minimal touch almost no touch ). There is no guarding or rebound. Musculoskeletal:         General: Tenderness (Rt lower back where the pain pump located) present. Normal range of motion. Cervical back: Normal range of motion and neck supple. Right lower leg: No edema. Left lower leg: No edema. Comments:  Middle distal phlanx amputed. Right lymphedema    Skin:     Coloration: Skin is not jaundiced. Comments: Port on right chest dressed appropriately    Multiple surgical scars healed scabs located throughout patient's back. Neurological:      General: No focal deficit present. Mental Status: She is alert and oriented to person, place, and time. Psychiatric:         Mood and Affect: Mood normal.       Patient no longer has IV access line  Hemodialysis access on the left arm    Labs:  Labs Reviewed    Recent Results (from the past 24 hour(s))   POCT Glucose    Collection Time: 06/23/22 12:08 PM   Result Value Ref Range    Meter Glucose 112 (H) 74 - 99 mg/dL   POCT Glucose    Collection Time: 06/23/22  5:37 PM   Result Value Ref Range    Meter Glucose 138 (H) 74 - 99 mg/dL   POCT Glucose    Collection Time: 06/23/22  8:28 PM   Result Value Ref Range    Meter Glucose 152 (H) 74 - 99 mg/dL   POCT Glucose    Collection Time: 06/24/22  6:44 AM   Result Value Ref Range    Meter Glucose 138 (H) 74 - 99 mg/dL       Radiology and other tests reviewed:  CT ABDOMEN PELVIS W IV CONTRAST Additional Contrast? None   Final Result   1. Pattern of generalized anasarca. 2.  No conspicuous localized inflammatory process in the mid mesentery fat   planes. No signs for acute diverticulitis. No bowel obstruction. No free   intraperitoneal air. The no ascites. 3.  Findings for inflammatory process/discitis in the levels of L1-L2 L3   which have been previously described. 4.  Previous spinal fusion of L3/L4/L5 which has been previously described. extremity [I89.0]     ESRD (end stage renal disease) on dialysis ([facility] [address] [N18.6, Z99.2]     Diabetic peripheral neuropathy ([facility] [address] [E11.42] 08/30/2018    Controlled type 2 diabetes mellitus with chronic kidney disease on chronic dialysis, with long-term current use of insulin ([facility] [address] [E11.22, N18.6, Z79.4, Z99.2] 02/22/2017    Glaucoma [H40.9]        Plan:  Exposed PowerPort  Not to be used for blood draws, not functioning   Blood cultures negative  Patient to receive antibiotics after hemodialysis on Monday before surgery  ID following appreciate input  Surgery following appreciate input - removal of port 6/27/2022 as outpatient    Bilateral lower lobe Health-care associated pneumonia- improving   Patient on room air   Zosyn 4.5 g Q12H completed 7 days, Augmentin ES 1000 mg renally adjusted for 1 more day  Duonebs Q4H PRN  Guaifenesin 400mg QID PRN  Chest therapy, incentive spirometer,   CBC, BMP daily  ID following appreciate input    Fecal incontinence unknown etiology at this time  No recent bowl movements   C. difficile rule out and isolation canceled     Colonic diverticulosis  Bilateral lower quadrant abdominal pain unknown cause - could be radiating from   KUB showed colonic diverticulosis, dilute contrast and lower GI tract no free air or bowel wall pneumonitis or dilated bowel. CT contrast abdomen and pelvis ordered showed no acute diverticulitis, and anascara generalized, small hiatal hernia  Pain control-Norco 5 to 7.5 every 6 hours as needed for moderate and severe pain  Continue to monitor    Chronic back pain  Patient has multiple fusions and a history of back pain. Patient has a tender , and working stimulator in the right lower back.   Pain control Norco every 6 hours for moderate to severe pain  Lidocaine patch   Neurosurgery contacted for further recommendations on pain pump    Vertebral osteomyelitis  Stable  Completed 6 weeks of IV vancomycin 750 mg IV antibiotic after dialysis MWF  PO Doxycyline 100 mg Q12H for suppression of osteomyelitis. -Will continue when discharged  ID following appreciate input    Mild to moderate pharyngeal phase dysphagia aspiration  Fluoroscopic modified barium swallow study showed no barium aspiration no significant swallowing deficits, transient laryngeal penetration is within thin liquid barium, cervical spine degenerative spondylitis  Avoid thin liquids with a straw  Speech language pathology following appreciate input    End-stage renal disease hemodialysis MWF  Creatinine baseline 3, today creatinine 3.2  Continue home Fosrenol 1 g TID- held  Continue Bumex 2mg  BMP, mag, Phos daily  Nephrology following appreciate input    CAD with NSTEMI 1 month ago  Continue home metoprolol 25 mg daily, Imdur 30 mg daily,   Ticagrelor 90mg BID held     CHF  Last echo with EF of 40 to 45%, dilated LA.   Monitor for fluid overload  Daily weights  Strict ins and outs  Continue home metoprolol succinate 25 mg daily, Imdur 30 mg daily    Anemia of chronic disease   No blood to be given to patient  Daily CBC   hemoglobin today -10.9    Insulin-dependent diabetes type 2  Last A1c 5.1  POCT glucose checks  Per glycemia protocol    History of hypertension  Currently soft blood pressures  Midodrine 5 mg daily as needed if blood pressure less than 100/60  Monitor blood pressures    Glaucoma right eye   bromonidine 0.2% ophthalmic solution 1 drop BID   Latanoprost 0.005% ophthalmic solution 1 drop  Nightly   Timolol 0.5% ophthalmic solution 1 drop BID       DVT ppx: Heparin 5000 units TID  GI ppx: none  Code Status: Full  Diet: Carb controlled -no straw use    Disposition: Discharge to pending clinical course  Rosy Guido MD  Family [id number]

## 2022-06-24 NOTE — PROGRESS NOTES
[address] [place] [address] [facility]  [facility]  Progress Note    SUBJECTIVE:  Chief Complaint   Patient presents with    Shortness of Breath     for a few days per pt    Altered Mental Status     per NH staff, pt able to answer all orientation questions     Patient is tolerating medications. No reported adverse drug reactions. No nausea, vomiting, diarrhea. --stools are improved  Patient laying in bed, family present, had HD today  Back pain worse today from laying in bed at HD and waiting from transport  Breathing has improved and she still coughs up some greyish sputum  No fevers    Review of systems:  As stated above in the chief complaint, otherwise negative.     Medications:  Scheduled Meds:   lidocaine  1 patch TransDERmal Daily    amoxicillin-clavulanate  1,200 mg Oral 2 times per day    lactobacillus  1 capsule Oral Daily    gabapentin  200 mg Oral TID    alteplase  2 mg IntraCATHeter Once    ipratropium-albuterol  1 ampule Inhalation 4x daily    doxycycline hyclate  100 mg Oral 2 times per day    bumetanide  2 mg Oral Once per day on Tue Thu Sat    isosorbide mononitrate  30 mg Oral Daily    latanoprost  1 drop Right Eye Nightly    metoprolol succinate  25 mg Oral Daily    pantoprazole  20 mg Oral QAM AC    [Held by provider] ticagrelor  90 mg Oral BID    sodium chloride flush  5-40 mL IntraVENous 2 times per day    [Held by provider] lanthanum  1,000 mg Oral TID WC    heparin (porcine)  5,000 Units SubCUTAneous TID    brimonidine  1 drop Right Eye BID    timolol  1 drop Right Eye BID    aspirin  81 mg Oral Daily     Continuous Infusions:   sodium chloride 25 mL (06/19/22 7918)    dextrose       PRN Meds:HYDROcodone-acetaminophen **OR** HYDROcodone 5 mg - acetaminophen, guaiFENesin, midodrine, sodium chloride flush, sodium chloride, ondansetron **OR** ondansetron, polyethylene glycol, acetaminophen **OR** acetaminophen, glucose, dextrose bolus **OR** dextrose bolus, glucagon (rDNA), dextrose    OBJECTIVE:  BP (!) 106/49   Pulse 73   Temp 97.8 °F (36.6 °C) (Axillary)   Resp 18   Ht 5' 10\" (1.778 m)   Wt 135 lb 2.3 oz (61.3 kg)   SpO2 97%   BMI 19.39 kg/m²   Temp  Av.8 °F (36.6 °C)  Min: 97.5 °F (36.4 °C)  Max: 98.2 °F (36.8 °C)  Constitutional: The patient is awake, alert, and oriented. Laying in bed  Skin: Warm and dry. No rashes were noted. HEENT: Round and reactive pupils. Moist mucous membranes. No ulcerations or thrush. Neck: Supple to movements. Chest: No use of accessory muscles to breathe. Symmetrical expansion. No wheezing, crackles. R mastectomy. Clear throughout. Cardiovascular: S1 and S2 are rhythmic and regular. No murmurs appreciated. Abdomen: Positive bowel sounds to auscultation. Benign to palpation. No masses felt. Extremities lymphedema  postmastectomy on the right.  Left arm AV fistula   Lines:  R chest Mediport - de-accessed due to exposure (see image)      Laboratory and Tests Review:  Lab Results   Component Value Date    WBC 3.7 (L) 2022    WBC 5.0 2022    WBC 4.3 (L) 2022    HGB 11.4 (L) 2022    HCT 36.0 2022    MCV 90.9 2022     2022     Lab Results   Component Value Date    NEUTROABS 1.61 (L) 2022    NEUTROABS 2.97 2022    NEUTROABS 2.59 2022     Lab Results   Component Value Date    CRPHS 0.7 2016    CRPHS 2.6 2016    CRPHS 7.6 (H) 2015     Lab Results   Component Value Date    ALT 16 06/15/2022    AST 19 06/15/2022    ALKPHOS 155 (H) 06/15/2022    BILITOT 0.7 06/15/2022     Lab Results   Component Value Date     2022    K 4.1 2022    K 3.6 2022    CL 99 2022    CO2 25 2022    BUN 32 2022    CREATININE 4.3 2022    CREATININE 3.2 2022    CREATININE 3.9 2022    GFRAA 12 2022    LABGLOM 12 2022    GLUCOSE 150 2022    GLUCOSE 46 2012    PROT 6.7 06/15/2022    LABALBU 3.5 06/15/2022 LABALBU 3.8 04/02/2012    CALCIUM 9.3 06/24/2022    BILITOT 0.7 06/15/2022    ALKPHOS 155 06/15/2022    AST 19 06/15/2022    ALT 16 06/15/2022     Lab Results   Component Value Date    CRP 7.7 (H) 04/13/2022    CRP 0.7 (H) 01/20/2022    CRP 1.6 (H) 12/20/2021     Lab Results   Component Value Date    SEDRATE 30 (H) 06/15/2022    SEDRATE 81 (H) 04/13/2022    SEDRATE 19 01/20/2022     Radiology:  Reviewed    Microbiology:   Respiratory Culture 6/16/22: few PMNL, moderate GPC in pairs  Blood Cultures 6/15/22: negative   COVID-19 PCR positive on respiratory array  Stools: pending    ASSESSMENT:  Aspiration pneumonia pneumonia/HCAP- improved  Vertebral osteomyelitis and infected hardware lumbar spine-suppressive doxycycline ongoing; just finished 6 weeks of Vanco with hemodialysis- now on suppression c doxy  Respiratory viral panel positive for COVID by PCR; rapid test was negative but clinically CAT scan reflects bacterial pneumonia at this point I believe the COVID is asymptomatic. Mild leukopenia    PLAN:  Continue Augmentin ES 1200mg (renally adjusted) for 1 more day  Doxycycline suppression for Vertebral Osteo  For Mediport removal Monday  - plan to give antibiotics for planned mediport removal on call to the OR with dialysis treatment  Speech following: video swallow completed-laryngeal penetration with thin liquids  New peripheral IV site obtained  Follow any evolution of COVID-19 only on room air  Labs and cultures reviewed     GOYO Gaston - CNP  2:07 PM  6/24/2022   Pt seen and examined. Above discussed agree with advanced practice nurse. Labs, cultures, and radiographs reviewed. Face to Face encounter occurred. Changes made as necessary.      Rufino Arndt MD

## 2022-06-24 NOTE — PLAN OF CARE
nutritional status  6/24/2022 1609 by Lillie Rascon RN  Outcome: Progressing  6/24/2022 0800 by Ita Marrero RN  Outcome: Progressing

## 2022-06-24 NOTE — PROGRESS NOTES
[facility]  Nephrology Progress Note    Patient's Name: Yamilka Mention      History of Present Illness-full consult deferred as pt followed longitudinally at dialysis:    \"Claudia Gandara is a 78 y.o. female with a past medical history of breast cancer, coronary artery disease, hypertension, hyperlipidemia, and anemia. She presented to the [facility] ED on 5/2 with complaints of back and abdominal pain patient was subsequently transferred to [facility] for NSTEMI. Patient is known to our service and dialyzes at [address] MWF 1st shift left AV fistula. In April she was Dx with DOMINICK zimmerman and is treated with iv Vanc  And has a mediport in the R  Pt is now admitted from the ED for cough and hypoxia. She came to the ED after having ongoing pain in the RLE when sitting, no significant pain when standing or using her walker. She notes since 6 days ago she had a cough productive of grey sputum. Resp Viral Panel (+) for SARS-CoV-2\"    6/24/22- Examined during HD treatment. Tolerating treatment well. States she feels better today, some lower abd tenderness. No nausea or vomiting. PMH:    Past Medical History:   Diagnosis Date    Acute infection of bone ([address]     infection of rt foot, resolved.     Acute osteomyelitis of phalanx of left hand ([address] 1/27/2022    Left third distal phalanx    Anemia of chronic disease     Arthritis     Breast cancer ([address]     right breast, 2008/ bladder, 2006- last chemotherapy \"years ago\"    CAD (coronary artery disease)     Carpal tunnel syndrome     bilat - for OR left hand 3-17-20     Chronic diastolic CHF (congestive heart failure) ([address] 09/23/2014 9/23/14- echocardiogram revealed moderate LV concentric hypertrophy, stage III diastolic dysfunction, mild tricuspid regurgitation    CKD (chronic kidney disease) stage 4, GFR 15-29 ml/min ([facility])     Diabetic retinopathy ([address]     Glaucoma     Hemodialysis patient ([address]     rajesh shahzad moctezuma- Dr. Prachi Sauceda - left arm fistula     Hyperkalemia, diminished renal excretion 11/9/2017    Hyperlipidemia     Hypertension     Hypoglycemia unawareness in type 1 diabetes mellitus ([address] 11/7/2017    Insulin dependent type 2 diabetes mellitus ([address]     Neuropathy     feet    Osteomyelitis due to secondary diabetes ([address]     rt great toe with amputation    Patient is Religious 11/7/2017    Refusal of blood product     patient states she dose not take blood transfusion    Ventricular hypertrophy     Ventricular tachycardia ([address] 5/24/2021    Vitreous hemorrhage (HCC)     left eye     Patient Active Problem List   Diagnosis    Diabetic retinopathy ([address]    Malignant neoplasm of right female breast ([address]    Atherosclerosis of native coronary artery of native heart without angina pectoris    Moderate obesity    Left ventricular hypertrophy    Herniated lumbar intervertebral disc    Lumbar degenerative disc disease    Pseudomeningocele    Lumbar radiculopathy    Lymphedema of arm    Anemia of chronic disease    Chronic diastolic CHF (congestive heart failure) ([address]    Hypertension    Glaucoma    Refusal of blood product    Elevated troponin    Controlled type 2 diabetes mellitus with chronic kidney disease on chronic dialysis, with long-term current use of insulin (HCC)    Vitreous hemorrhage ([address]    Patient is Religious    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 ([address]    Chronic pain syndrome    Lumbar post-laminectomy syndrome    Cervicalgia    Diabetic peripheral neuropathy ([address]    ESRD (end stage renal disease) ([address]    Bilateral carpal tunnel syndrome    Cardiac arrest ([address]    ESRD (end stage renal disease) on dialysis ([address]    Mixed hyperlipidemia    Lymphedema of right upper extremity    Coronary artery disease involving native coronary artery of native heart with angina pectoris ([address]    Mitral valve disease    CAD in native artery    Lumbar stenosis without neurogenic claudication    Intractable low back pain    Unable to ambulate    NSTEMI (non-ST elevated myocardial infarction) (HCC)    Moderate protein-calorie malnutrition (HCC)    AMS (altered mental status)    Ischemic cardiomyopathy    Healthcare-associated pneumonia    Hypoxia    Acute respiratory failure with hypoxia (HCC)    Lower abdominal pain    Pain     Meds:     lidocaine  1 patch TransDERmal Daily    amoxicillin-clavulanate  1,200 mg Oral 2 times per day    lactobacillus  1 capsule Oral Daily    gabapentin  200 mg Oral TID    alteplase  2 mg IntraCATHeter Once    ipratropium-albuterol  1 ampule Inhalation 4x daily    doxycycline hyclate  100 mg Oral 2 times per day    bumetanide  2 mg Oral Once per day on Tue Thu Sat    isosorbide mononitrate  30 mg Oral Daily    latanoprost  1 drop Right Eye Nightly    metoprolol succinate  25 mg Oral Daily    pantoprazole  20 mg Oral QAM AC    [Held by provider] ticagrelor  90 mg Oral BID    sodium chloride flush  5-40 mL IntraVENous 2 times per day    [Held by provider] lanthanum  1,000 mg Oral TID WC    heparin (porcine)  5,000 Units SubCUTAneous TID    brimonidine  1 drop Right Eye BID    timolol  1 drop Right Eye BID    aspirin  81 mg Oral Daily      sodium chloride 25 mL (06/19/22 0833)    dextrose       Meds prn:     HYDROcodone-acetaminophen **OR** HYDROcodone 5 mg - acetaminophen, guaiFENesin, midodrine, sodium chloride flush, sodium chloride, ondansetron **OR** ondansetron, polyethylene glycol, acetaminophen **OR** acetaminophen, glucose, dextrose bolus **OR** dextrose bolus, glucagon (rDNA), dextrose    Meds prior to admission:     No current facility-administered medications on file prior to encounter.      Current Outpatient Medications on File Prior to Encounter   Medication Sig Dispense Refill    gabapentin (NEURONTIN) 100 MG capsule Take 2 capsules by mouth 3 times daily for 180 1100 (!) 146/37 -- -- 70 -- -- --   06/24/22 1030 (!) 144/39 -- -- 67 -- -- --   06/24/22 1003 (!) 128/57 -- -- 67 -- -- --   06/24/22 0930 (!) 115/37 -- -- 66 -- -- --   06/24/22 0900 (!) 135/37 -- -- 65 -- -- --   06/24/22 0845 (!) 154/40 -- -- 68 -- -- --   06/24/22 0830 (!) 172/43 97.7 °F (36.5 °C) -- 66 16 -- 139 lb 8.8 oz (63.3 kg)   06/24/22 0718 -- -- -- -- 16 -- --   06/24/22 0648 -- -- -- -- 17 -- --   06/24/22 0524 -- -- -- -- -- -- 143 lb 4.8 oz (65 kg)   06/24/22 0230 (!) 126/57 98.2 °F (36.8 °C) Oral 70 16 96 % --   06/23/22 2014 (!) 131/53 98.1 °F (36.7 °C) Oral 72 17 95 % --   06/23/22 1813 -- -- -- -- 18 -- --   06/23/22 1608 -- -- -- 77 16 97 % --   06/23/22 1558 -- 97.6 °F (36.4 °C) Oral 69 16 97 % --     No intake or output data in the 24 hours ending 06/24/22 1239     General: Awake, alert, no acute distress  Neck: No JVD noted  Lungs: diminished throughout, no adventitious sounds. Mediport in the R chest  CV: RRR, no rub  Abd: Soft, LRQ tenderness, nondistended, + bowel sounds  Skin: Warm and dry. No rash on exposed extremities  Ext: 1+ RUE edema (h/o breast CA); No BLE edema ; left AV fistula in the upper ext, +thrill, +bruit  Neuro: Alert & oriented, answers questions appropriately    Data:    Recent Labs     06/21/22  1255 06/22/22  0750 06/24/22  0854   WBC 4.3* 5.0 3.7*   HGB 10.2* 10.9* 11.4*   HCT 33.0* 34.4 36.0   MCV 91.7 92.2 90.9    220 225     Recent Labs     06/21/22  1255 06/21/22  1255 06/22/22  0750 06/24/22  0854     --  138 138   K 3.8   < > 3.6  3.6 4.1     --  99 99   CO2 25  --  25 25   CREATININE 3.9*  --  3.2* 4.3*   BUN 18  --  15 32*   LABGLOM 14  --  18 12   GLUCOSE 101*  --  116* 150*   CALCIUM 9.3  --  9.8 9.3   PHOS 2.9  --  3.0  --    MG 2.0  --  2.0  --     < > = values in this interval not displayed.      Vit D, 25-Hydroxy   Date Value Ref Range Status   03/11/2022 28 (L) 30 - 100 ng/mL Final     Comment:     <20 ng/mL............ Rhenda Leanna Deficient  20-30 ng/mL. ......... Rhenda Lenana Insufficient   ng/mL. ........ Rhenda Leanna Sufficient  >100 ng/mL. .......... Rhenda Leanna Toxic       PTH   Date Value Ref Range Status   03/11/2022 241 (H) 15 - 65 pg/mL Final     No results for input(s): ALT, AST, ALKPHOS, BILITOT, BILIDIR in the last 72 hours. No results for input(s): LABALBU in the last 72 hours. Ferritin   Date Value Ref Range Status   06/15/2022 2,221 ng/mL Final     Comment:     FERRITIN Reference Ranges:  Adult Males   20 - 60 years:    30 - 400 ng/mL  Adult females 16 - 61 years:    15 - 150 ng/mL  Adults greater than 60 years:   no established reference range  Pediatrics:                     no established reference range       Iron   Date Value Ref Range Status   06/15/2022 31 (L) 37 - 145 mcg/dL Final     TIBC   Date Value Ref Range Status   06/15/2022 139 (L) 250 - 450 mcg/dL Final     Vitamin B-12   Date Value Ref Range Status   02/17/2017 1802 (H) 211 - 946 pg/mL Final     Folate   Date Value Ref Range Status   02/17/2017 >20.0 4.8 - 24.2 ng/mL Final     Lab Results   Component Value Date    COLORU Yellow 11/07/2017    NITRU Negative 11/07/2017    GLUCOSEU Negative 11/07/2017    GLUCOSEU NEGATIVE 08/27/2011    KETUA Negative 11/07/2017    UROBILINOGEN 0.2 11/07/2017    BILIRUBINUR Negative 11/07/2017    BILIRUBINUR NEGATIVE 08/27/2011     No results found for: ALVINO, CREURRAN, MACREATRATIO, OSMOU    No components found for: URIC    No results found for: LIPIDPAN    Assessment and Plans:    1. ESKD on HD  OP [facility] MWF 1st shift   left AV fistula  Abt CT showed anasarca  PLAN:  1. Continue HD MWF  2. Fluid removal with HD  3. Follow MWF labs  4. Next HD 6/27    2. Recent  NSTEMI  S/p cardiac catheterization with balloon angioplasty 5/5  Cardiology previously following  Brilinta on hold for mediport removal on 6/27      3.   Back pain  S/p bone biopsy 4/18 for lumbar spine osteomyelitis  changed to amoxicillin clavulanate with the doxycycline  ID following    4. Hypertension  BP goal<140/90  BP at goal  PLAN:  1. Monitor on current regimen and with HD    5. Anemia in CKD   Hemoglobin target 10-12  Hemoglobin 10.9 today  PLAN:  1. Continue JAYSHREE as hemoglobin below target  2. Transfuse for hemoglobin<7  3. Monitor H&H    6.  Secondary hyperparathyroidism of renal origin  PTH noted in the outpatient setting on 4/25/2022 was 443  Phosphorus 2.1-->2.7-->3.1-->2.9-->3.0  PLAN:  1. Continue to Hold Fosrenol   2. Monitor labs    7. Asp Pne/HCAP with recent COVID 19 Pne    8. Abd Pain  CT of the abd shows anasarca and generally benign abdominal findings  General surgery consulted    Follow MWF labs      GOYO Feliciano - CNS   Patient seen and examined. I had a face to face encounter with the patient. She notes moderate back pain this PM. She tolerated dialysis today with 2L vol removal  Agree with exam.    Agree with  formulation, assessment and plan as outlined above and directed by me. Addition and corrections were done as deemed appropriate. My exam and plan include:     A/P:  1.  ESKD-tolerated IHD-next IHD 6/27-follow labs    Continue current treatment as outlined above    TRES Flores MD

## 2022-06-24 NOTE — PROGRESS NOTES
P Quality Flow/Interdisciplinary Rounds Progress Note        Quality Flow Rounds held on June 24, 2022    Disciplines Attending:  Bedside Nurse, ,  and Nursing Unit Leadership    Wilian Graves was admitted on 6/15/2022  6:13 PM    Anticipated Discharge Date:       Disposition:    Minor Score:  Minor Scale Score: 20    Readmission Risk              Risk of Unplanned Readmission:  39           Discussed patient goal for the day, patient clinical progression, and barriers to discharge. The following Goal(s) of the Day/Commitment(s) have been identified:  port removal monday?        Hitesh Leblanc RN  June 24, 2022 Triage: patient was standing on headboard, fell onto bed, hurt left foot; no meds PTA

## 2022-06-24 NOTE — CARE COORDINATION
Social Work / Discharge Planning : Patient will return to [facility] at discharge. SHe is scheduled for a procedure Monday. Provided updated SW that this has to be in house procedure vs outpatient due to inurance issues. Patient is a bed hold and can return when medically stable. Joe who is covering for maggie in admissions did state they have Auth which is good through Tuesday. SW to follow.  Electronically signed by YRN Rondon on 6/24/22 at 12:22 PM ED

## 2022-06-24 NOTE — FLOWSHEET NOTE
06/24/22 1230   Vital Signs   BP (!) 145/75   Temp 97.5 °F (36.4 °C)   Heart Rate 66   Resp 16   Weight 135 lb 2.3 oz (61.3 kg)   Weight Method Bed scale   Percent Weight Change -3.16   Pain Assessment   Pain Assessment None - Denies Pain   Post-Hemodialysis Assessment   Post-Treatment Procedures Blood returned; Access bleeding time < 10 minutes   Machine Disinfection Process Exterior Machine Disinfection   Total Liters Processed (l/min) 81.2 l/min   Dialyzer Clearance Moderately streaked   Duration of Treatment (minutes) 210 minutes   Hemodialysis Intake (ml) 300 ml   Hemodialysis Output (ml) 2300 ml   NET Removed (ml) 2000   Tolerated Treatment Good   Patient Response to Treatment Report  to Cherelle Kidney RN   Bilateral Breath Sounds Diminished   Edema None   Edema Generalized None   RUE Edema None   LUE Edema None   RLE Edema None   LLE Edema None   Time Off 1215   Patient Disposition Return to room

## 2022-06-24 NOTE — PLAN OF CARE
Problem: Skin/Tissue Integrity  Goal: Absence of new skin breakdown  Description: 1. Monitor for areas of redness and/or skin breakdown  2. Assess vascular access sites hourly  3. Every 4-6 hours minimum:  Change oxygen saturation probe site  4. Every 4-6 hours:  If on nasal continuous positive airway pressure, respiratory therapy assess nares and determine need for appliance change or resting period.   Outcome: Progressing     Problem: Safety - Adult  Goal: Free from fall injury  Outcome: Progressing     Problem: ABCDS Injury Assessment  Goal: Absence of physical injury  Outcome: Progressing  Flowsheets (Taken 6/23/2022 2014)  Absence of Physical Injury: Implement safety measures based on patient assessment     Problem: Chronic Conditions and Co-morbidities  Goal: Patient's chronic conditions and co-morbidity symptoms are monitored and maintained or improved  Outcome: Progressing     Problem: Pain  Goal: Verbalizes/displays adequate comfort level or baseline comfort level  Outcome: Progressing  Flowsheets (Taken 6/23/2022 1813 by Zak Moreno RN)  Verbalizes/displays adequate comfort level or baseline comfort level: Encourage patient to monitor pain and request assistance     Problem: Nutrition Deficit:  Goal: Optimize nutritional status  Outcome: Progressing

## 2022-06-24 NOTE — PROGRESS NOTES
Patient transported to dialysis for scheduled tx this morning. Spoke with Harvey Smiley RN in dialysis reminding that labs need to be drawn while the patient is in dialysis. Domingo Vasquez verbalized understanding. Lab stickers and tubes sent with patient to dialysis.

## 2022-06-25 LAB
METER GLUCOSE: 113 MG/DL (ref 74–99)
METER GLUCOSE: 137 MG/DL (ref 74–99)
METER GLUCOSE: 147 MG/DL (ref 74–99)
METER GLUCOSE: 96 MG/DL (ref 74–99)

## 2022-06-25 PROCEDURE — 6370000000 HC RX 637 (ALT 250 FOR IP): Performed by: SPECIALIST

## 2022-06-25 PROCEDURE — 6360000002 HC RX W HCPCS: Performed by: FAMILY MEDICINE

## 2022-06-25 PROCEDURE — 99232 SBSQ HOSP IP/OBS MODERATE 35: CPT | Performed by: FAMILY MEDICINE

## 2022-06-25 PROCEDURE — 6370000000 HC RX 637 (ALT 250 FOR IP)

## 2022-06-25 PROCEDURE — 6370000000 HC RX 637 (ALT 250 FOR IP): Performed by: FAMILY MEDICINE

## 2022-06-25 PROCEDURE — 94640 AIRWAY INHALATION TREATMENT: CPT

## 2022-06-25 PROCEDURE — 2580000003 HC RX 258: Performed by: FAMILY MEDICINE

## 2022-06-25 PROCEDURE — 94668 MNPJ CHEST WALL SBSQ: CPT

## 2022-06-25 PROCEDURE — 1200000000 HC SEMI PRIVATE

## 2022-06-25 PROCEDURE — 82962 GLUCOSE BLOOD TEST: CPT

## 2022-06-25 RX ADMIN — TIMOLOL MALEATE 1 DROP: 5 SOLUTION OPHTHALMIC at 20:55

## 2022-06-25 RX ADMIN — DOXYCYCLINE HYCLATE 100 MG: 100 CAPSULE ORAL at 08:04

## 2022-06-25 RX ADMIN — HEPARIN SODIUM 5000 UNITS: 10000 INJECTION INTRAVENOUS; SUBCUTANEOUS at 20:56

## 2022-06-25 RX ADMIN — AMOXICILLIN AND CLAVULANATE POTASSIUM 1200 MG: 600; 42.9 POWDER, FOR SUSPENSION ORAL at 20:55

## 2022-06-25 RX ADMIN — LATANOPROST 1 DROP: 50 SOLUTION OPHTHALMIC at 20:55

## 2022-06-25 RX ADMIN — GABAPENTIN 200 MG: 100 CAPSULE ORAL at 20:56

## 2022-06-25 RX ADMIN — IPRATROPIUM BROMIDE AND ALBUTEROL SULFATE 1 AMPULE: .5; 2.5 SOLUTION RESPIRATORY (INHALATION) at 12:35

## 2022-06-25 RX ADMIN — GABAPENTIN 200 MG: 100 CAPSULE ORAL at 14:31

## 2022-06-25 RX ADMIN — DOXYCYCLINE HYCLATE 100 MG: 100 CAPSULE ORAL at 20:56

## 2022-06-25 RX ADMIN — SODIUM CHLORIDE, PRESERVATIVE FREE 10 ML: 5 INJECTION INTRAVENOUS at 08:04

## 2022-06-25 RX ADMIN — BRIMONIDINE TARTRATE 1 DROP: 2 SOLUTION/ DROPS OPHTHALMIC at 08:26

## 2022-06-25 RX ADMIN — HYDROCODONE BITARTRATE AND ACETAMINOPHEN 1 TABLET: 7.5; 325 TABLET ORAL at 08:23

## 2022-06-25 RX ADMIN — MIDODRINE HYDROCHLORIDE 5 MG: 5 TABLET ORAL at 12:01

## 2022-06-25 RX ADMIN — HEPARIN SODIUM 5000 UNITS: 10000 INJECTION INTRAVENOUS; SUBCUTANEOUS at 08:03

## 2022-06-25 RX ADMIN — AMOXICILLIN AND CLAVULANATE POTASSIUM 1200 MG: 600; 42.9 POWDER, FOR SUSPENSION ORAL at 08:28

## 2022-06-25 RX ADMIN — IPRATROPIUM BROMIDE AND ALBUTEROL SULFATE 1 AMPULE: .5; 2.5 SOLUTION RESPIRATORY (INHALATION) at 20:11

## 2022-06-25 RX ADMIN — GABAPENTIN 200 MG: 100 CAPSULE ORAL at 08:03

## 2022-06-25 RX ADMIN — HEPARIN SODIUM 5000 UNITS: 10000 INJECTION INTRAVENOUS; SUBCUTANEOUS at 14:31

## 2022-06-25 RX ADMIN — IPRATROPIUM BROMIDE AND ALBUTEROL SULFATE 1 AMPULE: .5; 2.5 SOLUTION RESPIRATORY (INHALATION) at 09:10

## 2022-06-25 RX ADMIN — Medication 1 CAPSULE: at 08:03

## 2022-06-25 RX ADMIN — TIMOLOL MALEATE 1 DROP: 5 SOLUTION OPHTHALMIC at 08:27

## 2022-06-25 RX ADMIN — SODIUM CHLORIDE, PRESERVATIVE FREE 10 ML: 5 INJECTION INTRAVENOUS at 21:02

## 2022-06-25 RX ADMIN — BUMETANIDE 2 MG: 1 TABLET ORAL at 08:04

## 2022-06-25 RX ADMIN — IPRATROPIUM BROMIDE AND ALBUTEROL SULFATE 1 AMPULE: .5; 2.5 SOLUTION RESPIRATORY (INHALATION) at 16:17

## 2022-06-25 RX ADMIN — BRIMONIDINE TARTRATE 1 DROP: 2 SOLUTION/ DROPS OPHTHALMIC at 20:55

## 2022-06-25 RX ADMIN — PANTOPRAZOLE SODIUM 20 MG: 20 TABLET, DELAYED RELEASE ORAL at 08:03

## 2022-06-25 RX ADMIN — ASPIRIN 81 MG CHEWABLE TABLET 81 MG: 81 TABLET CHEWABLE at 08:03

## 2022-06-25 ASSESSMENT — PAIN SCALES - GENERAL
PAINLEVEL_OUTOF10: 2
PAINLEVEL_OUTOF10: 8

## 2022-06-25 ASSESSMENT — PAIN DESCRIPTION - DESCRIPTORS: DESCRIPTORS: ACHING;DISCOMFORT

## 2022-06-25 ASSESSMENT — PAIN DESCRIPTION - ORIENTATION: ORIENTATION: LEFT

## 2022-06-25 ASSESSMENT — PAIN DESCRIPTION - LOCATION: LOCATION: BACK

## 2022-06-25 ASSESSMENT — PAIN DESCRIPTION - PAIN TYPE: TYPE: ACUTE PAIN

## 2022-06-25 NOTE — PROGRESS NOTES
[facility]  Nephrology Progress Note    Patient's Name: Vince Kinney    History of Present Illness-full consult deferred as pt followed longitudinally at [address] a 78 y.o. female with a past medical history of breast cancer, coronary artery disease, hypertension, hyperlipidemia, and anemia. She presented to the [facility] on 5/2 with complaints of back and abdominal pain patient was subsequently transferred to [facility] for NSTEMI. Patient is known to our service and dialyzes at [address] MWF 1st shift left AV fistula. In April she was Dx with DOMINICK zimmerman and is treated with iv Vanc  And has a mediport in the R  Pt is now admitted from the ED for cough and hypoxia. She came to the ED after having ongoing pain in the RLE when sitting, no significant pain when standing or using her walker. She notes since 6 days ago she had a cough productive of grey sputum. Resp Viral Panel (+) for SARS-CoV-2\"    Subjective:    6/25: Patient was seen and examined. She reports she feels \"fair. \"  She reports some abdominal pain, but denies any nausea. She denies any chest pain or shortness of breath. PMH:    Past Medical History:   Diagnosis Date    Acute infection of bone ([address]     infection of rt foot, resolved.     Acute osteomyelitis of phalanx of left hand ([address] 1/27/2022    Left third distal phalanx    Anemia of chronic disease     Arthritis     Breast cancer ([address]     right breast, 2008/ bladder, 2006- last chemotherapy \"years ago\"    CAD (coronary artery disease)     Carpal tunnel syndrome     bilat - for OR left hand 3-17-20     Chronic diastolic CHF (congestive heart failure) ([address] 09/23/2014 9/23/14- echocardiogram revealed moderate LV concentric hypertrophy, stage III diastolic dysfunction, mild tricuspid regurgitation    CKD (chronic kidney disease) stage 4, GFR 15-29 ml/min ([facility])     Diabetic retinopathy ([address]     Glaucoma     Hemodialysis patient ([address]     [facility]- Dr. Keya Kidd - left arm fistula     Hyperkalemia, diminished renal excretion 11/9/2017    Hyperlipidemia     Hypertension     Hypoglycemia unawareness in type 1 diabetes mellitus ([address] 11/7/2017    Insulin dependent type 2 diabetes mellitus ([address]     Neuropathy     feet    Osteomyelitis due to secondary diabetes ([address]     rt great toe with amputation    Patient is Oriental orthodox 11/7/2017    Refusal of blood product     patient states she dose not take blood transfusion    Ventricular hypertrophy     Ventricular tachycardia ([address] 5/24/2021    Vitreous hemorrhage (HCC)     left eye     Patient Active Problem List   Diagnosis    Diabetic retinopathy ([address]    Malignant neoplasm of right female breast ([address]    Atherosclerosis of native coronary artery of native heart without angina pectoris    Moderate obesity    Left ventricular hypertrophy    Herniated lumbar intervertebral disc    Lumbar degenerative disc disease    Pseudomeningocele    Lumbar radiculopathy    Lymphedema of arm    Anemia of chronic disease    Chronic diastolic CHF (congestive heart failure) ([address]    Hypertension    Glaucoma    Refusal of blood product    Elevated troponin    Controlled type 2 diabetes mellitus with chronic kidney disease on chronic dialysis, with long-term current use of insulin (HCC)    Vitreous hemorrhage ([address]    Patient is Oriental orthodox    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 ([address]    Chronic pain syndrome    Lumbar post-laminectomy syndrome    Cervicalgia    Diabetic peripheral neuropathy ([address]    ESRD (end stage renal disease) ([address]    Bilateral carpal tunnel syndrome    Cardiac arrest ([address]    ESRD (end stage renal disease) on dialysis ([address]    Mixed hyperlipidemia    Lymphedema of right upper extremity    Coronary artery disease involving native coronary artery of native heart with angina pectoris [facility]    Mitral valve disease    CAD in native artery    Lumbar stenosis without neurogenic claudication    Intractable low back pain    Unable to ambulate    NSTEMI (non-ST elevated myocardial infarction) (HCC)    Moderate protein-calorie malnutrition (HCC)    AMS (altered mental status)    Ischemic cardiomyopathy    Healthcare-associated pneumonia    Hypoxia    Acute respiratory failure with hypoxia (HCC)    Lower abdominal pain    Pain     Meds:     lidocaine  1 patch TransDERmal Daily    amoxicillin-clavulanate  1,200 mg Oral 2 times per day    lactobacillus  1 capsule Oral Daily    gabapentin  200 mg Oral TID    alteplase  2 mg IntraCATHeter Once    ipratropium-albuterol  1 ampule Inhalation 4x daily    doxycycline hyclate  100 mg Oral 2 times per day    bumetanide  2 mg Oral Once per day on Tue Thu Sat    isosorbide mononitrate  30 mg Oral Daily    latanoprost  1 drop Right Eye Nightly    metoprolol succinate  25 mg Oral Daily    pantoprazole  20 mg Oral QAM AC    [Held by provider] ticagrelor  90 mg Oral BID    sodium chloride flush  5-40 mL IntraVENous 2 times per day    [Held by provider] lanthanum  1,000 mg Oral TID WC    heparin (porcine)  5,000 Units SubCUTAneous TID    brimonidine  1 drop Right Eye BID    timolol  1 drop Right Eye BID    aspirin  81 mg Oral Daily      sodium chloride 25 mL (06/19/22 9146)    dextrose       Meds prn:     HYDROcodone-acetaminophen **OR** HYDROcodone 5 mg - acetaminophen, guaiFENesin, midodrine, sodium chloride flush, sodium chloride, ondansetron **OR** ondansetron, polyethylene glycol, acetaminophen **OR** acetaminophen, glucose, dextrose bolus **OR** dextrose bolus, glucagon (rDNA), dextrose    Meds prior to admission:     No current facility-administered medications on file prior to encounter.      Current Outpatient Medications on File Prior to Encounter   Medication Sig Dispense Refill    gabapentin (NEURONTIN) 100 MG (63 kg)   06/24/22 2219 -- -- -- -- 17 -- --   06/24/22 2149 (!) 110/56 98 °F (36.7 °C) Oral 71 17 95 % --   06/24/22 1615 (!) 97/54 98.2 °F (36.8 °C) Oral 67 18 97 % --   06/24/22 1442 -- -- -- -- 18 -- --   06/24/22 1345 (!) 106/49 97.8 °F (36.6 °C) Axillary 73 18 97 % --   06/24/22 1230 (!) 145/75 97.5 °F (36.4 °C) -- 66 16 -- 135 lb 2.3 oz (61.3 kg)   06/24/22 1200 107/64 -- -- 72 -- -- --   06/24/22 1130 (!) 155/43 -- -- 69 -- -- --   06/24/22 1100 (!) 146/37 -- -- 70 -- -- --   06/24/22 1030 (!) 144/39 -- -- 67 -- -- --   06/24/22 1003 (!) 128/57 -- -- 67 -- -- --   06/24/22 0930 (!) 115/37 -- -- 66 -- -- --   06/24/22 0900 (!) 135/37 -- -- 65 -- -- --   06/24/22 0845 (!) 154/40 -- -- 68 -- -- --   06/24/22 0830 (!) 172/43 97.7 °F (36.5 °C) -- 66 16 -- 139 lb 8.8 oz (63.3 kg)       Intake/Output Summary (Last 24 hours) at 6/25/2022 0731  Last data filed at 6/24/2022 1230  Gross per 24 hour   Intake 300 ml   Output 2300 ml   Net -2000 ml     General: Awake, alert, no acute distress  Neck: No JVD noted  Lungs: Clear bilaterally upper, diminished to the bases bilaterally. Unlabored  CV: Regular rate and rhythm. No rub  Abd: Soft, nontender, nondistended. Active bowel sounds  Skin: Warm and dry. No rash on exposed extremities  Ext: 1+ RUE edema (h/o breast CA); No BLE edema ; LUE AV fistula   Neuro: Awake, answers questions appropriately    Data:    Recent Labs     06/22/22  0750 06/24/22  0854   WBC 5.0 3.7*   HGB 10.9* 11.4*   HCT 34.4 36.0   MCV 92.2 90.9    225     Recent Labs     06/22/22  0750 06/24/22  0854    138   K 3.6  3.6 4.1   CL 99 99   CO2 25 25   CREATININE 3.2* 4.3*   BUN 15 32*   LABGLOM 18 12   GLUCOSE 116* 150*   CALCIUM 9.8 9.3   PHOS 3.0  --    MG 2.0  --      Vit D, 25-Hydroxy   Date Value Ref Range Status   03/11/2022 28 (L) 30 - 100 ng/mL Final     Comment:     <20 ng/mL. ........... Balbina Rast Deficient  20-30 ng/mL. ......... Balbina Rast Insufficient   ng/mL. ........ Balbina Rast Sufficient  >100 ng/mL........... Cloteal Bain Toxic       PTH   Date Value Ref Range Status   03/11/2022 241 (H) 15 - 65 pg/mL Final     No results for input(s): ALT, AST, ALKPHOS, BILITOT, BILIDIR in the last 72 hours. No results for input(s): LABALBU in the last 72 hours. Ferritin   Date Value Ref Range Status   06/15/2022 2,221 ng/mL Final     Comment:     FERRITIN Reference Ranges:  Adult Males   20 - 60 years:    30 - 400 ng/mL  Adult females 16 - 61 years:    15 - 150 ng/mL  Adults greater than 60 years:   no established reference range  Pediatrics:                     no established reference range       Iron   Date Value Ref Range Status   06/15/2022 31 (L) 37 - 145 mcg/dL Final     TIBC   Date Value Ref Range Status   06/15/2022 139 (L) 250 - 450 mcg/dL Final     Vitamin B-12   Date Value Ref Range Status   02/17/2017 1802 (H) 211 - 946 pg/mL Final     Folate   Date Value Ref Range Status   02/17/2017 >20.0 4.8 - 24.2 ng/mL Final     Lab Results   Component Value Date    COLORU Yellow 11/07/2017    NITRU Negative 11/07/2017    GLUCOSEU Negative 11/07/2017    GLUCOSEU NEGATIVE 08/27/2011    KETUA Negative 11/07/2017    UROBILINOGEN 0.2 11/07/2017    BILIRUBINUR Negative 11/07/2017    BILIRUBINUR NEGATIVE 08/27/2011     No results found for: ALVINO, CREURRAN, MACREATRATIO, OSMOU    No components found for: URIC    No results found for: LIPIDPAN    Assessment and Plans:    1. ESKD on HD  OP [facility] MWF 1st shift   left AV fistula  Abd CT 6/21 showed anasarca  PLAN:  1. Continue HD MWF  2. Fluid removal with HD  3. Follow MWF labs  4. Next HD 6/27     2. Recent NSTEMI  S/p cardiac catheterization with balloon angioplasty 5/5  Cardiology previously following  Brilinta on hold for mediport removal on 6/27      3. Back pain  S/p bone biopsy 4/18 for lumbar spine osteomyelitis  S/p 6 weeks of vanc   changed to amoxicillin clavulanate with the doxycycline  ID following     4. Hypertension  BP goal<140/90  BP at goal  PLAN:  1. Monitor on current regimen and with HD     5. Anemia in CKD   Hemoglobin target 10-12  Hemoglobin 11.4 on 6/24  PLAN:  1. No JAYSHREE as hemoglobin at target  2. Transfuse for hemoglobin<7  3. Monitor H&H     6.  Secondary hyperparathyroidism of renal origin  PTH noted in the outpatient setting on 6/15/2022 was 94  Phosphorus 2.1-->2.7-->3.1-->2.9-->3.0 on 6/22  PLAN:  1. Continue to hold Fosrenol   2. Monitor labs     7. Asp Pne/HCAP with recent COVID 19 Pne     8. Abd Pain  CT of the abd shows anasarca and generally benign abdominal findings  General surgery consulted    GOYO Lane - CNP   Patient seen and examined. I had a face to face encounter with the patient. She states the back pain is tolerable  Agree with exam.    Agree with  formulation, assessment and plan as outlined above and directed by me. Addition and corrections were done as deemed appropriate. My exam and plan include:   A/P:  1.  ESKD will plan for IHD on 6/27/22 -recheck K+ in the AM    Continue current treatment as outlined above    TRES Flores MD

## 2022-06-25 NOTE — PROGRESS NOTES
Dr Sherry Chong notified via perfect serve of bp 94/39. Pt asymptomatic. Metoprolol & isosorbide held. NNO.

## 2022-06-25 NOTE — PLAN OF CARE
Problem: Skin/Tissue Integrity  Goal: Absence of new skin breakdown  Description: 1. Monitor for areas of redness and/or skin breakdown  2. Assess vascular access sites hourly  3. Every 4-6 hours minimum:  Change oxygen saturation probe site  4. Every 4-6 hours:  If on nasal continuous positive airway pressure, respiratory therapy assess nares and determine need for appliance change or resting period.   Outcome: Progressing     Problem: Safety - Adult  Goal: Free from fall injury  Outcome: Progressing  Flowsheets (Taken 6/24/2022 2149)  Free From Fall Injury: Instruct family/caregiver on patient safety     Problem: ABCDS Injury Assessment  Goal: Absence of physical injury  Outcome: Progressing  Flowsheets (Taken 6/24/2022 2149)  Absence of Physical Injury: Implement safety measures based on patient assessment     Problem: Chronic Conditions and Co-morbidities  Goal: Patient's chronic conditions and co-morbidity symptoms are monitored and maintained or improved  Outcome: Progressing     Problem: Pain  Goal: Verbalizes/displays adequate comfort level or baseline comfort level  Outcome: Progressing     Problem: Nutrition Deficit:  Goal: Optimize nutritional status  Outcome: Progressing

## 2022-06-25 NOTE — PLAN OF CARE
Problem: Skin/Tissue Integrity  Goal: Absence of new skin breakdown  Description: 1. Monitor for areas of redness and/or skin breakdown  2. Assess vascular access sites hourly  3. Every 4-6 hours minimum:  Change oxygen saturation probe site  4. Every 4-6 hours:  If on nasal continuous positive airway pressure, respiratory therapy assess nares and determine need for appliance change or resting period.   6/25/2022 1002 by Elana So RN  Outcome: Progressing  6/25/2022 0801 by Michele Roberson RN  Outcome: Progressing     Problem: Safety - Adult  Goal: Free from fall injury  6/25/2022 1002 by Elana So RN  Outcome: Progressing  6/25/2022 0801 by Michele Roberson RN  Outcome: Progressing  Flowsheets  Taken 6/25/2022 0759 by Elana So RN  Free From Fall Injury: Instruct family/caregiver on patient safety  Taken 6/24/2022 2149 by Michele Roberson RN  Free From Fall Injury: Instruct family/caregiver on patient safety     Problem: ABCDS Injury Assessment  Goal: Absence of physical injury  6/25/2022 1002 by Elana So RN  Outcome: Progressing  6/25/2022 0801 by Michele Roberson RN  Outcome: Progressing  Flowsheets  Taken 6/25/2022 0759 by Elana So RN  Absence of Physical Injury: Implement safety measures based on patient assessment  Taken 6/24/2022 2149 by Michele Roberson RN  Absence of Physical Injury: Implement safety measures based on patient assessment     Problem: Chronic Conditions and Co-morbidities  Goal: Patient's chronic conditions and co-morbidity symptoms are monitored and maintained or improved  6/25/2022 1002 by Elana So RN  Outcome: Progressing  6/25/2022 0801 by Michele Roberson RN  Outcome: Progressing  Flowsheets (Taken 6/25/2022 0759 by Elana So RN)  Care Plan - Patient's Chronic Conditions and Co-Morbidity Symptoms are Monitored and Maintained or Improved: Monitor and assess patient's chronic conditions and comorbid symptoms for stability, deterioration, or improvement     Problem: Pain  Goal: Verbalizes/displays adequate comfort level or baseline comfort level  6/25/2022 1002 by Ranjit Cherry RN  Outcome: Progressing  6/25/2022 0801 by Ivis Pastrana RN  Outcome: Progressing     Problem: Nutrition Deficit:  Goal: Optimize nutritional status  6/25/2022 1002 by Ranjit Cherry RN  Outcome: Progressing  6/25/2022 0801 by Ivis Pastrana, RN  Outcome: Progressing

## 2022-06-25 NOTE — PROGRESS NOTES
[address] [address]  Progress Note    Chief complaint :  Chief Complaint   Patient presents with    Shortness of Breath     for a few days per pt    Altered Mental Status     per NH staff, pt able to answer all orientation questions       Subjective:  Patient sitting up eating breakfast comfortably. Patient states that she has been tolerating a diet denies having any abdominal discomfort, normal appetite and no nausea. She had dialysis done yesterday and she tolerated well. Patient denies having any fever, chills, shortness of breath, chest pain. Past medical, surgical, family and social history were reviewed, non-contributory, and unchanged unless otherwise stated. Objective:  BP (!) 110/56   Pulse 71   Temp 98 °F (36.7 °C) (Oral)   Resp 17   Ht 5' 10\" (1.778 m)   Wt 139 lb (63 kg)   SpO2 95%   BMI 19.94 kg/m²   Vital signs reviewed     Physical Exam  Vitals and nursing note reviewed. Constitutional:       Appearance: Normal appearance. Comments: Sitting up in bed eating breakfast   HENT:      Head: Normocephalic and atraumatic. Nose: Nose normal. No congestion or rhinorrhea. Eyes:      General: No scleral icterus. Conjunctiva/sclera: Conjunctivae normal.   Neck:      Vascular: No carotid bruit. Cardiovascular:      Rate and Rhythm: Normal rate and regular rhythm. Heart sounds: Murmur (2/6 systolic ) heard. No gallop. Pulmonary:      Effort: Pulmonary effort is normal.      Breath sounds: Normal breath sounds. No wheezing or rales. Comments:     Chest:   Breasts:      Right: Absent. Comments: Surgical scar closed and intact  Abdominal:      General: Bowel sounds are normal. There is no distension. Palpations: Abdomen is soft. There is no mass. Tenderness: There is abdominal tenderness (diffuse to minimal touch almost no touch ). There is no guarding or rebound.    Musculoskeletal:         General: Tenderness (Rt lower back where the pain pump located) present. Normal range of motion. Cervical back: Normal range of motion and neck supple. Right lower leg: No edema. Left lower leg: No edema. Comments:  Middle distal phlanx amputed. Right lymphedema    Skin:     Coloration: Skin is not jaundiced. Comments: Port on right chest dressed appropriately    Multiple surgical scars healed scabs located throughout patient's back. Neurological:      General: No focal deficit present. Mental Status: She is alert and oriented to person, place, and time.    Psychiatric:         Mood and Affect: Mood normal.       Patient no longer has IV access line  Hemodialysis access on the left arm    Labs:  Labs Reviewed    Recent Results (from the past 24 hour(s))   CBC with Auto Differential    Collection Time: 06/24/22  8:54 AM   Result Value Ref Range    WBC 3.7 (L) 4.5 - 11.5 E9/L    RBC 3.96 3.50 - 5.50 E12/L    Hemoglobin 11.4 (L) 11.5 - 15.5 g/dL    Hematocrit 36.0 34.0 - 48.0 %    MCV 90.9 80.0 - 99.9 fL    MCH 28.8 26.0 - 35.0 pg    MCHC 31.7 (L) 32.0 - 34.5 %    RDW 18.6 (H) 11.5 - 15.0 fL    Platelets 983 404 - 683 E9/L    MPV 11.2 7.0 - 12.0 fL    Neutrophils % 44.0 43.0 - 80.0 %    Immature Granulocytes % 0.8 0.0 - 5.0 %    Lymphocytes % 34.4 20.0 - 42.0 %    Monocytes % 17.2 (H) 2.0 - 12.0 %    Eosinophils % 2.5 0.0 - 6.0 %    Basophils % 1.1 0.0 - 2.0 %    Neutrophils Absolute 1.61 (L) 1.80 - 7.30 E9/L    Immature Granulocytes # 0.03 E9/L    Lymphocytes Absolute 1.26 (L) 1.50 - 4.00 E9/L    Monocytes Absolute 0.63 0.10 - 0.95 E9/L    Eosinophils Absolute 0.09 0.05 - 0.50 E9/L    Basophils Absolute 0.04 0.00 - 0.20 I3/F   Basic Metabolic Panel    Collection Time: 06/24/22  8:54 AM   Result Value Ref Range    Sodium 138 132 - 146 mmol/L    Potassium 4.1 3.5 - 5.0 mmol/L    Chloride 99 98 - 107 mmol/L    CO2 25 22 - 29 mmol/L    Anion Gap 14 7 - 16 mmol/L    Glucose 150 (H) 74 - 99 mg/dL    BUN 32 (H) 6 - 23 mg/dL    CREATININE 4.3 (H) 0.5 - 1.0 mg/dL    GFR Non-African American 12 >=60 mL/min/1.73    GFR African American 12     Calcium 9.3 8.6 - 10.2 mg/dL   POCT Glucose    Collection Time: 06/24/22  4:17 PM   Result Value Ref Range    Meter Glucose 132 (H) 74 - 99 mg/dL   POCT Glucose    Collection Time: 06/24/22  7:54 PM   Result Value Ref Range    Meter Glucose 154 (H) 74 - 99 mg/dL   POCT Glucose    Collection Time: 06/25/22  5:50 AM   Result Value Ref Range    Meter Glucose 113 (H) 74 - 99 mg/dL       Radiology and other tests reviewed:  CT ABDOMEN PELVIS W IV CONTRAST Additional Contrast? None   Final Result   1. Pattern of generalized anasarca. 2.  No conspicuous localized inflammatory process in the mid mesentery fat   planes. No signs for acute diverticulitis. No bowel obstruction. No free   intraperitoneal air. The no ascites. 3.  Findings for inflammatory process/discitis in the levels of L1-L2 L3   which have been previously described. 4.  Previous spinal fusion of L3/L4/L5 which has been previously described. 5.  Edema with generalized anasarca correlate with volume overload and   chronic renal failure. The extensive vascular arterial calcifications   correlate also with chronic renal failure. RECOMMENDATIONS:   Unavailable         XR ABDOMEN (KUB) (SINGLE AP VIEW)   Final Result   Colonic diverticulosis. Fluoroscopy modified barium swallow with video   Final Result   1. No barium aspiration or significant swallowing deficits. 2.  Transient laryngeal penetration with thin liquid barium. 3.  Cervical spine degenerative spondylosis. 4.  Please see separate speech pathology report for full discussion of   findings and recommendations. CT HEAD WO CONTRAST   Final Result   No acute intracranial abnormality. CT CHEST WO CONTRAST   Final Result   1.  New ground-glass and airspace consolidations throughout right lung notable   in right lower lobe as well as minimal opacities in left lower lobe   suggesting interstitial pulmonary edema or bilateral pneumonia. 2. Cardiomegaly with pulmonary vascular congestion. 3. Multiple stable prominent mediastinal lymph nodes. XR CHEST PORTABLE   Final Result   No acute process.            Assessment:  Active Hospital Problems    Diagnosis Date Noted    Patient is Confucianist [Z78.9] 11/07/2017     Priority: High     Class: Chronic    Acute respiratory failure with hypoxia (HCC) [J96.01]      Priority: Medium    Lower abdominal pain [R10.30]      Priority: Medium    Healthcare-associated pneumonia [J18.9] 06/16/2022     Priority: Medium    Hypoxia [R09.02] 06/16/2022     Priority: Medium    Ischemic cardiomyopathy [I25.5]      Priority: Medium    AMS (altered mental status) [R41.82] 06/15/2022     Priority: Medium    Elevated troponin [R77.8] 02/17/2017     Priority: Medium    Pain [R52] 06/15/2022    CAD in native artery [I25.10] 12/09/2021    Mitral valve disease [I05.9] 11/11/2021    Lymphedema of right upper extremity [I89.0]     ESRD (end stage renal disease) on dialysis ([facility] [N18.6, Z99.2]     Diabetic peripheral neuropathy ([facility] [E11.42] 08/30/2018    Controlled type 2 diabetes mellitus with chronic kidney disease on chronic dialysis, with long-term current use of insulin ([facility] [E11.22, N18.6, Z79.4, Z99.2] 02/22/2017    Glaucoma [H40.9]        Plan:  Exposed PowerPort  Not to be used for blood draws, not functioning   For MEDIPORT removal Monday, surgery following   Patient to receive antibiotics after hemodialysis on Monday before surgery  ID following appreciate input, will plan for abx on call to OR     Bilateral lower lobe Health-care associated pneumonia- improving   Zosyn 4.5 g Q12H completed 7 days,   Continue Augmentin ES 1000 mg renally adjusted for 1 more dose   Duonebs Q4H PRN  Guaifenesin 400mg QID PRN  Chest therapy, incentive spirometer  CBC, BMP daily  ID following appreciate input    Mild to moderate pharyngeal phase dysphagia aspiration  Video swallow showed laryngeal penetration with thin liquids   Avoid thin liquids with a straw  Speech language pathology following appreciate input    Chronic back pain  Patient has multiple fusions and a history of back pain. Patient has a tender , and working stimulator in the right lower back. Pain control [place] every 6 hours prn for moderate to severe pain  Lidocaine patch   Neurosurgery contacted for further recommendations on pain pump    End-stage renal disease hemodialysis MWF  Creatinine baseline 3, today creatinine awaiting   Hold  home Fosrenol 1 g TID- held  Continue Bumex 2mg  BMP, mag, Phos daily  Nephrology following appreciate input    CAD with NSTEMI 1 month ago  Continue home metoprolol 25 mg daily, Imdur 30 mg daily  Ticagrelor 90mg BID held for procedure on Monday     Colonic diverticulosis  Bilateral lower quadrant abdominal pain unknown cause - could be radiating from   KUB showed colonic diverticulosis, dilute contrast and lower GI tract no free air or bowel wall pneumonitis or dilated bowel. CT contrast abdomen and pelvis ordered showed no acute diverticulitis, and anascara generalized, small hiatal hernia  Pain control-Norco 5 to 7.5 every 6 hours as needed for moderate and severe pain  Continue to monitor    Vertebral osteomyelitis  Stable  Completed 6 weeks of IV vancomycin 750 mg IV antibiotic after dialysis MWF  PO Doxycyline 100 mg Q12H for suppression of osteomyelitis. -Will continue when discharged  ID following appreciate input    CHF  Last echo with EF of 40 to 45%, dilated LA.   Monitor for fluid overload  Daily weights  Strict ins and outs  Continue home metoprolol succinate 25 mg daily, Imdur 30 mg daily    Anemia of chronic disease   No blood to be given to patient  Daily CBC   hemoglobin today -pending     Insulin-dependent diabetes type 2  Last A1c 5.1  POCT glucose checks  Per glycemia

## 2022-06-26 LAB
ANION GAP SERPL CALCULATED.3IONS-SCNC: 15 MMOL/L (ref 7–16)
BUN BLDV-MCNC: 38 MG/DL (ref 6–23)
CALCIUM SERPL-MCNC: 9.2 MG/DL (ref 8.6–10.2)
CHLORIDE BLD-SCNC: 98 MMOL/L (ref 98–107)
CO2: 25 MMOL/L (ref 22–29)
CREAT SERPL-MCNC: 4.7 MG/DL (ref 0.5–1)
GFR AFRICAN AMERICAN: 11
GFR NON-AFRICAN AMERICAN: 11 ML/MIN/1.73
GLUCOSE BLD-MCNC: 151 MG/DL (ref 74–99)
METER GLUCOSE: 115 MG/DL (ref 74–99)
METER GLUCOSE: 122 MG/DL (ref 74–99)
METER GLUCOSE: 132 MG/DL (ref 74–99)
METER GLUCOSE: 159 MG/DL (ref 74–99)
POTASSIUM SERPL-SCNC: 4.1 MMOL/L (ref 3.5–5)
SODIUM BLD-SCNC: 138 MMOL/L (ref 132–146)

## 2022-06-26 PROCEDURE — 82962 GLUCOSE BLOOD TEST: CPT

## 2022-06-26 PROCEDURE — 1200000000 HC SEMI PRIVATE

## 2022-06-26 PROCEDURE — 2580000003 HC RX 258: Performed by: FAMILY MEDICINE

## 2022-06-26 PROCEDURE — 6370000000 HC RX 637 (ALT 250 FOR IP)

## 2022-06-26 PROCEDURE — 6360000002 HC RX W HCPCS: Performed by: FAMILY MEDICINE

## 2022-06-26 PROCEDURE — 6370000000 HC RX 637 (ALT 250 FOR IP): Performed by: FAMILY MEDICINE

## 2022-06-26 PROCEDURE — 80048 BASIC METABOLIC PNL TOTAL CA: CPT

## 2022-06-26 PROCEDURE — 94640 AIRWAY INHALATION TREATMENT: CPT

## 2022-06-26 PROCEDURE — 36415 COLL VENOUS BLD VENIPUNCTURE: CPT

## 2022-06-26 PROCEDURE — 6370000000 HC RX 637 (ALT 250 FOR IP): Performed by: SPECIALIST

## 2022-06-26 PROCEDURE — 94668 MNPJ CHEST WALL SBSQ: CPT

## 2022-06-26 PROCEDURE — 99231 SBSQ HOSP IP/OBS SF/LOW 25: CPT | Performed by: FAMILY MEDICINE

## 2022-06-26 RX ADMIN — HYDROCODONE BITARTRATE AND ACETAMINOPHEN 1 TABLET: 7.5; 325 TABLET ORAL at 04:38

## 2022-06-26 RX ADMIN — GABAPENTIN 200 MG: 100 CAPSULE ORAL at 19:34

## 2022-06-26 RX ADMIN — BRIMONIDINE TARTRATE 1 DROP: 2 SOLUTION/ DROPS OPHTHALMIC at 19:37

## 2022-06-26 RX ADMIN — HYDROCODONE BITARTRATE AND ACETAMINOPHEN 1 TABLET: 7.5; 325 TABLET ORAL at 19:37

## 2022-06-26 RX ADMIN — Medication 1 CAPSULE: at 08:30

## 2022-06-26 RX ADMIN — SODIUM CHLORIDE, PRESERVATIVE FREE 10 ML: 5 INJECTION INTRAVENOUS at 19:34

## 2022-06-26 RX ADMIN — TIMOLOL MALEATE 1 DROP: 5 SOLUTION OPHTHALMIC at 19:37

## 2022-06-26 RX ADMIN — HEPARIN SODIUM 5000 UNITS: 10000 INJECTION INTRAVENOUS; SUBCUTANEOUS at 15:09

## 2022-06-26 RX ADMIN — IPRATROPIUM BROMIDE AND ALBUTEROL SULFATE 1 AMPULE: .5; 2.5 SOLUTION RESPIRATORY (INHALATION) at 09:58

## 2022-06-26 RX ADMIN — IPRATROPIUM BROMIDE AND ALBUTEROL SULFATE 1 AMPULE: .5; 2.5 SOLUTION RESPIRATORY (INHALATION) at 16:38

## 2022-06-26 RX ADMIN — HEPARIN SODIUM 5000 UNITS: 10000 INJECTION INTRAVENOUS; SUBCUTANEOUS at 19:34

## 2022-06-26 RX ADMIN — IPRATROPIUM BROMIDE AND ALBUTEROL SULFATE 1 AMPULE: .5; 2.5 SOLUTION RESPIRATORY (INHALATION) at 20:12

## 2022-06-26 RX ADMIN — GABAPENTIN 200 MG: 100 CAPSULE ORAL at 15:09

## 2022-06-26 RX ADMIN — DOXYCYCLINE HYCLATE 100 MG: 100 CAPSULE ORAL at 19:34

## 2022-06-26 RX ADMIN — METOPROLOL SUCCINATE 25 MG: 25 TABLET, FILM COATED, EXTENDED RELEASE ORAL at 08:30

## 2022-06-26 RX ADMIN — PANTOPRAZOLE SODIUM 20 MG: 20 TABLET, DELAYED RELEASE ORAL at 04:38

## 2022-06-26 RX ADMIN — BRIMONIDINE TARTRATE 1 DROP: 2 SOLUTION/ DROPS OPHTHALMIC at 08:41

## 2022-06-26 RX ADMIN — ISOSORBIDE MONONITRATE 30 MG: 30 TABLET, EXTENDED RELEASE ORAL at 08:31

## 2022-06-26 RX ADMIN — SODIUM CHLORIDE, PRESERVATIVE FREE 10 ML: 5 INJECTION INTRAVENOUS at 08:42

## 2022-06-26 RX ADMIN — IPRATROPIUM BROMIDE AND ALBUTEROL SULFATE 1 AMPULE: .5; 2.5 SOLUTION RESPIRATORY (INHALATION) at 12:50

## 2022-06-26 RX ADMIN — ASPIRIN 81 MG CHEWABLE TABLET 81 MG: 81 TABLET CHEWABLE at 08:30

## 2022-06-26 RX ADMIN — GABAPENTIN 200 MG: 100 CAPSULE ORAL at 08:31

## 2022-06-26 RX ADMIN — DOXYCYCLINE HYCLATE 100 MG: 100 CAPSULE ORAL at 08:30

## 2022-06-26 RX ADMIN — LATANOPROST 1 DROP: 50 SOLUTION OPHTHALMIC at 19:37

## 2022-06-26 RX ADMIN — HEPARIN SODIUM 5000 UNITS: 10000 INJECTION INTRAVENOUS; SUBCUTANEOUS at 08:31

## 2022-06-26 RX ADMIN — TIMOLOL MALEATE 1 DROP: 5 SOLUTION OPHTHALMIC at 08:41

## 2022-06-26 ASSESSMENT — PAIN DESCRIPTION - ONSET: ONSET: ON-GOING

## 2022-06-26 ASSESSMENT — PAIN SCALES - GENERAL
PAINLEVEL_OUTOF10: 7
PAINLEVEL_OUTOF10: 8

## 2022-06-26 ASSESSMENT — PAIN - FUNCTIONAL ASSESSMENT: PAIN_FUNCTIONAL_ASSESSMENT: PREVENTS OR INTERFERES SOME ACTIVE ACTIVITIES AND ADLS

## 2022-06-26 ASSESSMENT — PAIN DESCRIPTION - LOCATION: LOCATION: BACK

## 2022-06-26 ASSESSMENT — PAIN DESCRIPTION - ORIENTATION: ORIENTATION: RIGHT

## 2022-06-26 ASSESSMENT — PAIN DESCRIPTION - PAIN TYPE: TYPE: ACUTE PAIN

## 2022-06-26 ASSESSMENT — PAIN DESCRIPTION - FREQUENCY: FREQUENCY: INTERMITTENT

## 2022-06-26 ASSESSMENT — PAIN DESCRIPTION - DESCRIPTORS: DESCRIPTORS: ACHING;DISCOMFORT

## 2022-06-26 NOTE — PROGRESS NOTES
[address] [address]  Progress Note    Chief complaint :  Chief Complaint   Patient presents with    Shortness of Breath     for a few days per pt    Altered Mental Status     per NH staff, pt able to answer all orientation questions       Subjective: That the pain in her back has improved. She is excited to get her port out tomorrow and get onto the next step. Denies fever, chills, chest pain, shortness of breath, abdominal pain. Tolerating diet. Past medical, surgical, family and social history were reviewed, non-contributory, and unchanged unless otherwise stated. Objective:  BP (!) 128/56   Pulse 62   Temp 98 °F (36.7 °C) (Oral)   Resp 18   Ht 5' 10\" (1.778 m)   Wt 139 lb 14.4 oz (63.5 kg)   SpO2 96%   BMI 20.07 kg/m²   Vital signs reviewed     Physical Exam  Vitals and nursing note reviewed. Constitutional:       Appearance: Normal appearance. Comments: Sitting up in bed eating breakfast   HENT:      Head: Normocephalic and atraumatic. Nose: Nose normal. No congestion or rhinorrhea. Eyes:      General: No scleral icterus. Conjunctiva/sclera: Conjunctivae normal.   Neck:      Vascular: No carotid bruit. Cardiovascular:      Rate and Rhythm: Normal rate and regular rhythm. Heart sounds: Murmur (2/6 systolic ) heard. No gallop. Pulmonary:      Effort: Pulmonary effort is normal.      Breath sounds: Normal breath sounds. No wheezing or rales. Comments:     Chest:   Breasts:      Right: Absent. Comments: Surgical scar closed and intact  Abdominal:      General: Bowel sounds are normal. There is no distension. Palpations: Abdomen is soft. There is no mass. Tenderness: There is abdominal tenderness (Significant improvement in tenderness). There is no guarding or rebound. Musculoskeletal:         General: Tenderness (Improvement in right lower back pain) present. Normal range of motion.       Cervical back: Normal range of motion and neck supple. Right lower leg: No edema. Left lower leg: No edema. Comments:  Middle distal phlanx amputed. Right lymphedema    Skin:     Coloration: Skin is not jaundiced. Comments: Port on right chest dressed appropriately    Multiple surgical scars healed scabs located throughout patient's back. Neurological:      General: No focal deficit present. Mental Status: She is alert and oriented to person, place, and time. Psychiatric:         Mood and Affect: Mood normal.       Hemodialysis access on the left arm    Labs:  Labs Reviewed    Recent Results (from the past 24 hour(s))   POCT Glucose    Collection Time: 06/25/22 11:22 AM   Result Value Ref Range    Meter Glucose 147 (H) 74 - 99 mg/dL   POCT Glucose    Collection Time: 06/25/22  4:31 PM   Result Value Ref Range    Meter Glucose 137 (H) 74 - 99 mg/dL   POCT Glucose    Collection Time: 06/25/22  8:54 PM   Result Value Ref Range    Meter Glucose 96 74 - 99 mg/dL   Basic Metabolic Panel    Collection Time: 06/26/22  4:19 AM   Result Value Ref Range    Sodium 138 132 - 146 mmol/L    Potassium 4.1 3.5 - 5.0 mmol/L    Chloride 98 98 - 107 mmol/L    CO2 25 22 - 29 mmol/L    Anion Gap 15 7 - 16 mmol/L    Glucose 151 (H) 74 - 99 mg/dL    BUN 38 (H) 6 - 23 mg/dL    CREATININE 4.7 (H) 0.5 - 1.0 mg/dL    GFR Non-African American 11 >=60 mL/min/1.73    GFR African American 11     Calcium 9.2 8.6 - 10.2 mg/dL   POCT Glucose    Collection Time: 06/26/22  5:41 AM   Result Value Ref Range    Meter Glucose 115 (H) 74 - 99 mg/dL       Radiology and other tests reviewed:  CT ABDOMEN PELVIS W IV CONTRAST Additional Contrast? None   Final Result   1. Pattern of generalized anasarca. 2.  No conspicuous localized inflammatory process in the mid mesentery fat   planes. No signs for acute diverticulitis. No bowel obstruction. No free   intraperitoneal air. The no ascites.       3.  Findings for inflammatory process/discitis in the levels of L1-L2 L3   which have been previously described. 4.  Previous spinal fusion of L3/L4/L5 which has been previously described. 5.  Edema with generalized anasarca correlate with volume overload and   chronic renal failure. The extensive vascular arterial calcifications   correlate also with chronic renal failure. RECOMMENDATIONS:   Unavailable         XR ABDOMEN (KUB) (SINGLE AP VIEW)   Final Result   Colonic diverticulosis. Fluoroscopy modified barium swallow with video   Final Result   1. No barium aspiration or significant swallowing deficits. 2.  Transient laryngeal penetration with thin liquid barium. 3.  Cervical spine degenerative spondylosis. 4.  Please see separate speech pathology report for full discussion of   findings and recommendations. CT HEAD WO CONTRAST   Final Result   No acute intracranial abnormality. CT CHEST WO CONTRAST   Final Result   1. New ground-glass and airspace consolidations throughout right lung notable   in right lower lobe as well as minimal opacities in left lower lobe   suggesting interstitial pulmonary edema or bilateral pneumonia. 2. Cardiomegaly with pulmonary vascular congestion. 3. Multiple stable prominent mediastinal lymph nodes. XR CHEST PORTABLE   Final Result   No acute process.            Assessment:  Active Hospital Problems    Diagnosis Date Noted    Patient is Episcopalian [Z78.9] 11/07/2017     Priority: High     Class: Chronic    Acute respiratory failure with hypoxia (HCC) [J96.01]      Priority: Medium    Lower abdominal pain [R10.30]      Priority: Medium    Healthcare-associated pneumonia [J18.9] 06/16/2022     Priority: Medium    Hypoxia [R09.02] 06/16/2022     Priority: Medium    Ischemic cardiomyopathy [I25.5]      Priority: Medium    AMS (altered mental status) [R41.82] 06/15/2022     Priority: Medium    Elevated troponin [R77.8] 02/17/2017     Priority: Medium    Pain [R52] 06/15/2022    CAD in native artery [I25.10] 12/09/2021    Mitral valve disease [I05.9] 11/11/2021    Lymphedema of right upper extremity [I89.0]     ESRD (end stage renal disease) on dialysis ([facility] [N18.6, Z99.2]     Diabetic peripheral neuropathy ([facility] [E11.42] 08/30/2018    Controlled type 2 diabetes mellitus with chronic kidney disease on chronic dialysis, with long-term current use of insulin ([facility] [E11.22, N18.6, Z79.4, Z99.2] 02/22/2017    Glaucoma [H40.9]        Plan:  Exposed [employer]  Not to be used for blood draws, not functioning   For MEDIPORT removal tomorrow, surgery following   Patient to receive antibiotics after hemodialysis tomorrow before surgery  ID following appreciate input, will plan for abx on call to OR     Bilateral lower lobe Health-care associated pneumonia- improving   Zosyn 4.5 g Q12H completed 7 days,   Continue Augmentin ES 1000 mg renally adjusted for 1 more dose   Duonebs Q4H PRN  Guaifenesin 400mg QID PRN  Chest therapy, incentive spirometer  CBC, BMP daily  ID following appreciate input    Mild to moderate pharyngeal phase dysphagia aspiration  Video swallow showed laryngeal penetration with thin liquids   Avoid thin liquids with a straw  Speech language pathology following appreciate input    Chronic back pain  Patient has multiple fusions and a history of back pain. Patient has a tender , and working stimulator in the right lower back.   Pain control [place] every 6 hours prn for moderate to severe pain  Lidocaine patch   Patient neurosurgery follow-up for pain pump    End-stage renal disease hemodialysis MWF  Hold  home Fosrenol 1 g TID- held  Continue Bumex 2mg  BMP, mag, Phos daily  Nephrology following appreciate input    CAD with NSTEMI 1 month ago  Continue home metoprolol 25 mg daily, Imdur 30 mg daily  Ticagrelor 90mg BID held for procedure on Monday     Colonic diverticulosis  Bilateral lower quadrant abdominal pain unknown cause - could be radiating from   KUB showed colonic diverticulosis, dilute contrast and lower GI tract no free air or bowel wall pneumonitis or dilated bowel. CT contrast abdomen and pelvis ordered showed no acute diverticulitis, and anascara generalized, small hiatal hernia  Pain control-Nor[place] 5 to 7.5 every 6 hours as needed for moderate and severe pain  Continue to monitor    Vertebral osteomyelitis  Stable  Completed 6 weeks of IV vancomycin 750 mg IV antibiotic after dialysis MWF  PO Doxycyline 100 mg Q12H for suppression of osteomyelitis. -Will continue when discharged  ID following appreciate input    CHF  Last echo with EF of 40 to 45%, dilated LA.   Monitor for fluid overload  Daily weights  Strict ins and outs  Continue home metoprolol succinate 25 mg daily, Imdur 30 mg daily    Anemia of chronic disease   No blood to be given to patient  Daily CBC   hemoglobin today -pending     Insulin-dependent diabetes type 2  Last A1c 5.1  POCT glucose checks  Per glycemia protocol    History of hypertension  Currently soft blood pressures  Midodrine 5 mg daily as needed if blood pressure less than 100/60  Monitor blood pressures    Glaucoma right eye   bromonidine 0.2% ophthalmic solution 1 drop BID   Latanoprost 0.005% ophthalmic solution 1 drop  Nightly   Timolol 0.5% ophthalmic solution 1 drop BID     DVT ppx: Heparin 5000 units TID  GI ppx: protonix 20 mg daily   Code Status: Full  Diet: Carb controlled -no straw use    Disposition: Discharge to [facility],  PGY2   06/26/22  7:42 AM

## 2022-06-26 NOTE — PROGRESS NOTES
[address] [place] [address] [facility]  Progress Note    SUBJECTIVE:  Chief Complaint   Patient presents with    Shortness of Breath     for a few days per pt    Altered Mental Status     per NH staff, pt able to answer all orientation questions     Patient is tolerating medications. No reported adverse drug reactions. No nausea, vomiting, diarrhea. --stools are improved  Patient laying in bed, watching her [employer] service  Feeling well, no complaints  No fevers    Review of systems:  As stated above in the chief complaint, otherwise negative.     Medications:  Scheduled Meds:   lidocaine  1 patch TransDERmal Daily    lactobacillus  1 capsule Oral Daily    gabapentin  200 mg Oral TID    alteplase  2 mg IntraCATHeter Once    ipratropium-albuterol  1 ampule Inhalation 4x daily    doxycycline hyclate  100 mg Oral 2 times per day    bumetanide  2 mg Oral Once per day on Tue Thu Sat    isosorbide mononitrate  30 mg Oral Daily    latanoprost  1 drop Right Eye Nightly    metoprolol succinate  25 mg Oral Daily    pantoprazole  20 mg Oral QAM AC    [Held by provider] ticagrelor  90 mg Oral BID    sodium chloride flush  5-40 mL IntraVENous 2 times per day    [Held by provider] lanthanum  1,000 mg Oral TID WC    heparin (porcine)  5,000 Units SubCUTAneous TID    brimonidine  1 drop Right Eye BID    timolol  1 drop Right Eye BID    aspirin  81 mg Oral Daily     Continuous Infusions:   sodium chloride 25 mL (06/19/22 2148)    dextrose       PRN Meds:HYDROcodone-acetaminophen **OR** HYDROcodone 5 mg - acetaminophen, guaiFENesin, midodrine, sodium chloride flush, sodium chloride, ondansetron **OR** ondansetron, polyethylene glycol, acetaminophen **OR** acetaminophen, glucose, dextrose bolus **OR** dextrose bolus, glucagon (rDNA), dextrose    OBJECTIVE:  BP (!) 116/47   Pulse 66   Temp 97.5 °F (36.4 °C) (Oral)   Resp 18   Ht 5' 10\" (1.778 m)   Wt 139 lb 14.4 oz (63.5 kg)   SpO2 98%   BMI 20.07 kg/m²   Temp  Avg: 97.9 °F (36.6 °C)  Min: 97.5 °F (36.4 °C)  Max: 98.2 °F (36.8 °C)  Constitutional: The patient is awake, alert, and oriented. Laying in bed  Skin: Warm and dry. No rashes were noted. HEENT: Round and reactive pupils. Moist mucous membranes. No ulcerations or thrush. Neck: Supple to movements. Chest: No use of accessory muscles to breathe. Symmetrical expansion. No wheezing, crackles. R mastectomy. Clear throughout. Cardiovascular: S1 and S2 are rhythmic and regular. No murmurs appreciated. Abdomen: Positive bowel sounds to auscultation. Benign to palpation. No masses felt. Extremities lymphedema  postmastectomy on the right.  Left arm AV fistula   Lines:  R chest Mediport - de-accessed due to exposure (see image)      Laboratory and Tests Review:  Lab Results   Component Value Date    WBC 3.7 (L) 06/24/2022    WBC 5.0 06/22/2022    WBC 4.3 (L) 06/21/2022    HGB 11.4 (L) 06/24/2022    HCT 36.0 06/24/2022    MCV 90.9 06/24/2022     06/24/2022     Lab Results   Component Value Date    NEUTROABS 1.61 (L) 06/24/2022    NEUTROABS 2.97 06/22/2022    NEUTROABS 2.59 06/21/2022     Lab Results   Component Value Date    CRPHS 0.7 08/16/2016    CRPHS 2.6 04/05/2016    CRPHS 7.6 (H) 01/28/2015     Lab Results   Component Value Date    ALT 16 06/15/2022    AST 19 06/15/2022    ALKPHOS 155 (H) 06/15/2022    BILITOT 0.7 06/15/2022     Lab Results   Component Value Date     06/26/2022    K 4.1 06/26/2022    K 3.6 06/22/2022    CL 98 06/26/2022    CO2 25 06/26/2022    BUN 38 06/26/2022    CREATININE 4.7 06/26/2022    CREATININE 4.3 06/24/2022    CREATININE 3.2 06/22/2022    GFRAA 11 06/26/2022    LABGLOM 11 06/26/2022    GLUCOSE 151 06/26/2022    GLUCOSE 46 04/02/2012    PROT 6.7 06/15/2022    LABALBU 3.5 06/15/2022    LABALBU 3.8 04/02/2012    CALCIUM 9.2 06/26/2022    BILITOT 0.7 06/15/2022    ALKPHOS 155 06/15/2022    AST 19 06/15/2022    ALT 16 06/15/2022     Lab Results   Component Value Date    CRP 7.7 (H) 04/13/2022    CRP 0.7 (H) 01/20/2022    CRP 1.6 (H) 12/20/2021     Lab Results   Component Value Date    SEDRATE 30 (H) 06/15/2022    SEDRATE 81 (H) 04/13/2022    SEDRATE 19 01/20/2022     Radiology:  Reviewed    Microbiology:   Respiratory Culture 6/16/22: few PMNL, moderate GPC in pairs  Blood Cultures 6/15/22: negative   COVID-19 PCR positive on respiratory array  Stools: pending    ASSESSMENT:  Aspiration pneumonia pneumonia/HCAP- improved  Vertebral osteomyelitis and infected hardware lumbar spine-suppressive doxycycline ongoing; just finished 6 weeks of Vanco with hemodialysis- now on suppression c doxy  Respiratory viral panel positive for COVID by PCR; rapid test was negative but clinically CAT scan reflects bacterial pneumonia at this point I believe the COVID is asymptomatic. Mild leukopenia    PLAN:  Doxycycline suppression for Vertebral Osteo  For Mediport removal Monday  - ordered Vanco and Cefepime on call to the OR, plan to give with dialysis treatment  Speech following: video swallow completed-laryngeal penetration with thin liquids  Labs and cultures reviewed   DC plan Ioana 73, APRN - CNP  9:45 AM  6/26/2022   Pt seen and examined. Above discussed agree with advanced practice nurse. Labs, cultures, and radiographs reviewed. Face to Face encounter occurred. Changes made as necessary.      Mindi Del Valle MD

## 2022-06-26 NOTE — PROGRESS NOTES
[facility]  Nephrology Progress Note    Patient's Name: Glenn Kinney    History of Present Illness-full consult deferred as pt followed longitudinally at [address] a 78 y.o. female with a past medical history of breast cancer, coronary artery disease, hypertension, hyperlipidemia, and anemia. She presented to the [id number] [facility] ED on 5/2 with complaints of back and abdominal pain patient was subsequently transferred to [facility] for NSTEMI. Patient is known to our service and dialyzes at [address] MWF 1st shift left AV fistula. In April she was Dx with DOMINICK zimmerman and is treated with iv Vanc  And has a mediport in the R  Pt is now admitted from the ED for cough and hypoxia. She came to the ED after having ongoing pain in the RLE when sitting, no significant pain when standing or using her walker. She notes since 6 days ago she had a cough productive of grey sputum. Resp Viral Panel (+) for SARS-CoV-2\"    Subjective:    6/26: Patient was seen and examined. She reports that she feels [place]. \" She explained that she is eating better. She does report some abdominal pain, but denies any nausea. She denies any current chest pain or shortness of breath. She has a visitor at the bedside. PMH:    Past Medical History:   Diagnosis Date    Acute infection of bone ([address]     infection of rt foot, resolved.     Acute osteomyelitis of phalanx of left hand ([address] 1/27/2022    Left third distal phalanx    Anemia of chronic disease     Arthritis     Breast cancer ([address]     right breast, 2008/ bladder, 2006- last chemotherapy \"years ago\"    CAD (coronary artery disease)     Carpal tunnel syndrome     bilat - for OR left hand 3-17-20     Chronic diastolic CHF (congestive heart failure) ([address] 09/23/2014 9/23/14- echocardiogram revealed moderate LV concentric hypertrophy, stage III diastolic dysfunction, mild tricuspid regurgitation    CKD (chronic kidney disease) stage 4, GFR 15-29 ml/min (HCC)     Diabetic retinopathy (HCC)     Glaucoma     Hemodialysis patient ([address]     [facility]- Dr. Danny Crawford - left arm fistula     Hyperkalemia, diminished renal excretion 11/9/2017    Hyperlipidemia     Hypertension     Hypoglycemia unawareness in type 1 diabetes mellitus ([address] 11/7/2017    Insulin dependent type 2 diabetes mellitus ([address]     Neuropathy     feet    Osteomyelitis due to secondary diabetes ([address]     rt great toe with amputation    Patient is Holiness 11/7/2017    Refusal of blood product     patient states she dose not take blood transfusion    Ventricular hypertrophy     Ventricular tachycardia ([address] 5/24/2021    Vitreous hemorrhage (HCC)     left eye     Patient Active Problem List   Diagnosis    Diabetic retinopathy ([address]    Malignant neoplasm of right female breast ([address]    Atherosclerosis of native coronary artery of native heart without angina pectoris    Moderate obesity    Left ventricular hypertrophy    Herniated lumbar intervertebral disc    Lumbar degenerative disc disease    Pseudomeningocele    Lumbar radiculopathy    Lymphedema of arm    Anemia of chronic disease    Chronic diastolic CHF (congestive heart failure) ([address]    Hypertension    Glaucoma    Refusal of blood product    Elevated troponin    Controlled type 2 diabetes mellitus with chronic kidney disease on chronic dialysis, with long-term current use of insulin (HCC)    Vitreous hemorrhage ([address]    Patient is Holiness    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 ([address]    Chronic pain syndrome    Lumbar post-laminectomy syndrome    Cervicalgia    Diabetic peripheral neuropathy ([address]    ESRD (end stage renal disease) ([address]    Bilateral carpal tunnel syndrome    Cardiac arrest ([address]    ESRD (end stage renal disease) on dialysis ([address]    Mixed hyperlipidemia    Lymphedema of right upper extremity    Coronary artery disease involving native coronary artery of native heart with angina pectoris [facility]    Mitral valve disease    CAD in native artery    Lumbar stenosis without neurogenic claudication    Intractable low back pain    Unable to ambulate    NSTEMI (non-ST elevated myocardial infarction) (HCC)    Moderate protein-calorie malnutrition (HCC)    AMS (altered mental status)    Ischemic cardiomyopathy    Healthcare-associated pneumonia    Hypoxia    Acute respiratory failure with hypoxia (HCC)    Lower abdominal pain    Pain     Meds:     lidocaine  1 patch TransDERmal Daily    lactobacillus  1 capsule Oral Daily    gabapentin  200 mg Oral TID    alteplase  2 mg IntraCATHeter Once    ipratropium-albuterol  1 ampule Inhalation 4x daily    doxycycline hyclate  100 mg Oral 2 times per day    bumetanide  2 mg Oral Once per day on Tue Thu Sat    isosorbide mononitrate  30 mg Oral Daily    latanoprost  1 drop Right Eye Nightly    metoprolol succinate  25 mg Oral Daily    pantoprazole  20 mg Oral QAM AC    [Held by provider] ticagrelor  90 mg Oral BID    sodium chloride flush  5-40 mL IntraVENous 2 times per day    [Held by provider] lanthanum  1,000 mg Oral TID WC    heparin (porcine)  5,000 Units SubCUTAneous TID    brimonidine  1 drop Right Eye BID    timolol  1 drop Right Eye BID    aspirin  81 mg Oral Daily      sodium chloride 25 mL (06/19/22 2148)    dextrose       Meds prn:     HYDROcodone-acetaminophen **OR** HYDROcodone 5 mg - acetaminophen, guaiFENesin, midodrine, sodium chloride flush, sodium chloride, ondansetron **OR** ondansetron, polyethylene glycol, acetaminophen **OR** acetaminophen, glucose, dextrose bolus **OR** dextrose bolus, glucagon (rDNA), dextrose    Meds prior to admission:     No current facility-administered medications on file prior to encounter.      Current Outpatient Medications on File Prior to Encounter   Medication Sig Dispense Refill    gabapentin (NEURONTIN) 100 MG capsule Take 2 capsules by mouth 3 times daily for 180 days. 180 capsule 5    atorvastatin (LIPITOR) 80 MG tablet Take 80 mg by mouth nightly      bumetanide (BUMEX) 2 MG tablet Take 2 mg by mouth three times a week Given Sunday,Tuesday,Thursday      isosorbide mononitrate (IMDUR) 30 MG extended release tablet Take 30 mg by mouth daily      insulin glargine (LANTUS) 100 UNIT/ML injection vial Inject 5 Units into the skin nightly       metoprolol succinate (TOPROL XL) 25 MG extended release tablet Take 1 tablet by mouth daily 90 tablet 1    lanthanum (FOSRENOL) 1000 MG chewable tablet Take 1,000 mg by mouth 3 times daily (with meals)       allopurinol (ZYLOPRIM) 100 MG tablet Take 1 tablet by mouth daily 90 tablet 1    ticagrelor (BRILINTA) 90 MG TABS tablet Take 1 tablet by mouth 2 times daily 180 tablet 1    B Complex-C-Folic Acid (BONI CAPS) 1 MG CAPS Take 1 mg by mouth nightly       omeprazole (PRILOSEC) 20 MG delayed release capsule Take 20 mg by mouth daily as needed       LUMIGAN 0.01 % SOLN ophthalmic drops Place 1 drop into the right eye nightly       COMBIGAN 0.2-0.5 % ophthalmic solution Place 1 drop into the right eye every 12 hours   7    aspirin 81 MG EC tablet Take 1 tablet by mouth daily 30 tablet 0    midodrine (PROAMATINE) 5 MG tablet Take 5 mg by mouth daily as needed      lidocaine-prilocaine (EMLA) 2.5-2.5 % cream Apply topically as needed for Pain        Allergies:    Furosemide    Social History:     reports that she has never smoked. She has never used smokeless tobacco. She reports that she does not drink alcohol and does not use drugs.     Family History:         Problem Relation Age of Onset   Thomas Pineda Breast Cancer Mother 61    Hypertension Mother     Heart Disease Father     Prostate Cancer Father     Breast Cancer Maternal Grandmother 61     Physical Exam:    Patient Vitals for the past 24 hrs:   BP Temp Temp src Pulse Resp SpO2 Weight   06/26/22 0800 (!) 116/47 97.5 °F (36.4 °C) Oral 66 18 98 % --   06/26/22 0430 (!) 128/56 -- -- 62 -- -- --   06/26/22 0059 -- -- -- -- -- -- 139 lb 14.4 oz (63.5 kg)   06/26/22 0000 (!) 127/53 98 °F (36.7 °C) Oral 74 18 -- --   06/25/22 2231 (!) 112/47 -- -- -- -- -- --   06/25/22 2230 (!) 118/30 -- -- 70 -- -- --   06/25/22 2206 (!) 118/30 -- -- 72 -- -- --   06/25/22 2011 (!) 87/37 98.2 °F (36.8 °C) Oral 70 18 96 % --   06/25/22 1636 (!) 114/49 97.9 °F (36.6 °C) Oral 70 16 100 % --   06/25/22 1618 -- -- -- 74 18 96 % --   06/25/22 1235 -- -- -- 67 16 96 % --   06/25/22 1200 92/65 -- -- 72 -- -- --   06/25/22 0912 -- -- -- 65 16 97 % --       Intake/Output Summary (Last 24 hours) at 6/26/2022 0911  Last data filed at 6/25/2022 1827  Gross per 24 hour   Intake --   Output 0 ml   Net 0 ml     General: Awake, alert, no acute distress  Neck: No JVD noted  Lungs: Clear bilaterally upper, diminished to the bases bilaterally. Unlabored  CV: Regular rate and rhythm. No rub  Abd: Soft, nontender, nondistended. Active bowel sounds  Skin: Warm and dry. No rash on exposed extremities  Ext: 1+ RUE edema (h/o breast CA); No BLE edema ; LUE AV fistula   Neuro: Awake, answers questions appropriately    Data:    Recent Labs     06/24/22  0854   WBC 3.7*   HGB 11.4*   HCT 36.0   MCV 90.9        Recent Labs     06/24/22  0854 06/26/22  0419    138   K 4.1 4.1   CL 99 98   CO2 25 25   CREATININE 4.3* 4.7*   BUN 32* 38*   LABGLOM 12 11   GLUCOSE 150* 151*   CALCIUM 9.3 9.2     Vit D, 25-Hydroxy   Date Value Ref Range Status   03/11/2022 28 (L) 30 - 100 ng/mL Final     Comment:     <20 ng/mL. ........... Pinky Angles Deficient  20-30 ng/mL. ......... Pinky Angles Insufficient   ng/mL. ........ Pinky Angles Sufficient  >100 ng/mL. .......... Pinky Angles Toxic       PTH   Date Value Ref Range Status   03/11/2022 241 (H) 15 - 65 pg/mL Final     No results for input(s): ALT, AST, ALKPHOS, BILITOT, BILIDIR in the last 72 hours.     No results for input(s): LABALBU in the last 72 hours.    Ferritin   Date Value Ref Range Status   06/15/2022 2,221 ng/mL Final     Comment:     FERRITIN Reference Ranges:  Adult Males   20 - 60 years:    30 - 400 ng/mL  Adult females 16 - 61 years:    15 - 150 ng/mL  Adults greater than 60 years:   no established reference range  Pediatrics:                     no established reference range       Iron   Date Value Ref Range Status   06/15/2022 31 (L) 37 - 145 mcg/dL Final     TIBC   Date Value Ref Range Status   06/15/2022 139 (L) 250 - 450 mcg/dL Final     Vitamin B-12   Date Value Ref Range Status   02/17/2017 1802 (H) 211 - 946 pg/mL Final     Folate   Date Value Ref Range Status   02/17/2017 >20.0 4.8 - 24.2 ng/mL Final     Lab Results   Component Value Date    COLORU Yellow 11/07/2017    NITRU Negative 11/07/2017    GLUCOSEU Negative 11/07/2017    GLUCOSEU NEGATIVE 08/27/2011    KETUA Negative 11/07/2017    UROBILINOGEN 0.2 11/07/2017    BILIRUBINUR Negative 11/07/2017    BILIRUBINUR NEGATIVE 08/27/2011     No results found for: ALVINO, CREURRAN, MACREATRATIO, OSMOU    No components found for: URIC    No results found for: LIPIDPAN    Assessment and Plans:    1. ESKD on HD  OP [facility] MWF 1st shift   left AV fistula  Abd CT 6/21 showed anasarca  PLAN:  1. Continue HD MWF  2. Fluid removal with HD  3. Follow MWF labs  4. Next HD 6/27     2. Recent NSTEMI  S/p cardiac catheterization with balloon angioplasty 5/5  Cardiology previously following  Brilinta on hold for mediport removal on 6/27      3. Back pain  S/p bone biopsy 4/18 for lumbar spine osteomyelitis  S/p 6 weeks of vanc   changed to amoxicillin clavulanate with the doxycycline  ID following     4. Hypertension  BP goal<140/90  BP at goal  PLAN:  1. Monitor on current regimen and with HD     5. Anemia in CKD   Hemoglobin target 10-12  Hemoglobin 11.4 on 6/24  PLAN:  1. No JAYSHREE as hemoglobin at target  2. Transfuse for hemoglobin<7  3.  Monitor H&H     6.  Secondary hyperparathyroidism of

## 2022-06-26 NOTE — PROGRESS NOTES
[address]  Progress Note    Chief complaint :  Chief Complaint   Patient presents with    Shortness of Breath     for a few days per pt    Altered Mental Status     per NH staff, pt able to answer all orientation questions       Subjective:    Patient states that she is doing well. She is ready to get the hemodialysis over so they can do the procedure by removing the port. Patient states that her lungs are much improved, still has a cough once in a while however denies any shortness of breath. Patient denies having any fever, chills, abdominal, pain chest, nausea or vomiting    Per nurse no overnight events. Past medical, surgical, family and social history were reviewed, non-contributory, and unchanged unless otherwise stated. Objective:  BP (!) 114/42   Pulse 75   Temp 97.4 °F (36.3 °C) (Oral)   Resp 16   Ht 5' 10\" (1.778 m)   Wt 142 lb (64.4 kg)   SpO2 99%   BMI 20.37 kg/m²   Vital signs reviewed     Physical Exam  Vitals and nursing note reviewed. Constitutional:       Appearance: Normal appearance. Comments: Resting comfortably   HENT:      Head: Normocephalic and atraumatic. Nose: Nose normal. No congestion or rhinorrhea. Eyes:      General: No scleral icterus. Conjunctiva/sclera: Conjunctivae normal.   Neck:      Vascular: No carotid bruit. Cardiovascular:      Rate and Rhythm: Normal rate and regular rhythm. Heart sounds: Murmur (2/6 systolic ) heard. No gallop. Pulmonary:      Effort: Pulmonary effort is normal.      Breath sounds: Normal breath sounds. No wheezing or rales. Comments:     Chest:   Breasts:      Right: Absent. Comments: Surgical scar closed and intact  Abdominal:      General: Bowel sounds are normal. There is no distension. Palpations: Abdomen is soft. There is no mass. Tenderness: There is no abdominal tenderness. There is no guarding or rebound.    Musculoskeletal:         General: Tenderness (Rt lower back where the pain pump located) present. Normal range of motion. Cervical back: Normal range of motion and neck supple. Right lower leg: No edema. Left lower leg: No edema. Comments:  Middle distal phlanx amputed. Right lymphedema    Skin:     Coloration: Skin is not jaundiced. Comments: Port on right chest dressed appropriately    Multiple surgical scars healed scabs located throughout patient's back. Neurological:      General: No focal deficit present. Mental Status: She is alert and oriented to person, place, and time. Psychiatric:         Mood and Affect: Mood normal.       Labs:  Labs Reviewed    Recent Results (from the past 24 hour(s))   POCT Glucose    Collection Time: 06/26/22 11:41 AM   Result Value Ref Range    Meter Glucose 122 (H) 74 - 99 mg/dL   POCT Glucose    Collection Time: 06/26/22  5:01 PM   Result Value Ref Range    Meter Glucose 132 (H) 74 - 99 mg/dL   POCT Glucose    Collection Time: 06/26/22  7:33 PM   Result Value Ref Range    Meter Glucose 159 (H) 74 - 99 mg/dL   POCT Glucose    Collection Time: 06/27/22  5:33 AM   Result Value Ref Range    Meter Glucose 138 (H) 74 - 99 mg/dL       Radiology and other tests reviewed:  CT ABDOMEN PELVIS W IV CONTRAST Additional Contrast? None   Final Result   1. Pattern of generalized anasarca. 2.  No conspicuous localized inflammatory process in the mid mesentery fat   planes. No signs for acute diverticulitis. No bowel obstruction. No free   intraperitoneal air. The no ascites. 3.  Findings for inflammatory process/discitis in the levels of L1-L2 L3   which have been previously described. 4.  Previous spinal fusion of L3/L4/L5 which has been previously described. 5.  Edema with generalized anasarca correlate with volume overload and   chronic renal failure. The extensive vascular arterial calcifications   correlate also with chronic renal failure.       RECOMMENDATIONS: Unavailable         XR ABDOMEN (KUB) (SINGLE AP VIEW)   Final Result   Colonic diverticulosis. Fluoroscopy modified barium swallow with video   Final Result   1. No barium aspiration or significant swallowing deficits. 2.  Transient laryngeal penetration with thin liquid barium. 3.  Cervical spine degenerative spondylosis. 4.  Please see separate speech pathology report for full discussion of   findings and recommendations. CT HEAD WO CONTRAST   Final Result   No acute intracranial abnormality. CT CHEST WO CONTRAST   Final Result   1. New ground-glass and airspace consolidations throughout right lung notable   in right lower lobe as well as minimal opacities in left lower lobe   suggesting interstitial pulmonary edema or bilateral pneumonia. 2. Cardiomegaly with pulmonary vascular congestion. 3. Multiple stable prominent mediastinal lymph nodes. XR CHEST PORTABLE   Final Result   No acute process.              Assessment:  Active Hospital Problems    Diagnosis Date Noted    Patient is Faith [Z78.9] 11/07/2017     Priority: High     Class: Chronic    Acute respiratory failure with hypoxia (HCC) [J96.01]      Priority: Medium    Lower abdominal pain [R10.30]      Priority: Medium    Healthcare-associated pneumonia [J18.9] 06/16/2022     Priority: Medium    Hypoxia [R09.02] 06/16/2022     Priority: Medium    Ischemic cardiomyopathy [I25.5]      Priority: Medium    AMS (altered mental status) [R41.82] 06/15/2022     Priority: Medium    Elevated troponin [R77.8] 02/17/2017     Priority: Medium    Pain [R52] 06/15/2022    CAD in native artery [I25.10] 12/09/2021    Mitral valve disease [I05.9] 11/11/2021    Lymphedema of right upper extremity [I89.0]     ESRD (end stage renal disease) on dialysis ([address] [N18.6, Z99.2]     Diabetic peripheral neuropathy ([facility] [address] [E11.42] 08/30/2018    Controlled type 2 diabetes mellitus with chronic kidney disease on chronic dialysis, with long-term current use of insulin ([facility] [address] [E11.22, N18.6, Z79.4, Z99.2] 02/22/2017    Glaucoma [H40.9]        Plan:  Exposed PowerPort  Not to be used for blood draws, not functioning   Patient to receive antibiotics after hemodialysis on today before surgery  ID following appreciate input - Vancomycin and Cefepime prior to procedure   Surgery following appreciate input - MEDIPORT removal today     Bilateral lower lobe Health-care associated pneumonia- Improved  Completed Zosyn and Augmentin ES   Duonebs Q4H PRN  Guaifenesin 400mg QID PRN  Chest therapy, incentive spirometer,   CBC, BMP daily  ID following appreciate input    End-stage renal disease hemodialysis MWF  Continue home Fosrenol 1 g TID- held  Continue Bumex 2mg  BMP, mag, Phos daily  Nephrology following appreciate input     CAD with NSTEMI 1 month ago  Continue home metoprolol 25 mg daily, Imdur 30 mg daily,   Ticagrelor 90mg BID held for procedure     Chronic back pain  Patient has multiple fusions and a history of back pain. Lidocaine patch   Neurosurgery contacted for further recommendations on pain pump    Vertebral osteomyelitis  Stable  Completed 6 weeks of IV vancomycin 750 mg IV antibiotic after dialysis MWF  PO Doxycyline 100 mg Q12H for suppression of osteomyelitis. -Will continue when discharged   ID following appreciate input    CHF  Last echo with EF of 40 to 45%, dilated LA.   Monitor for fluid overload  Daily weights  Strict ins and outs  Continue home metoprolol succinate 25 mg daily, Imdur 30 mg daily    Anemia of chronic disease   No blood to be given to patient  Daily CBC ,continue to monitor     Insulin-dependent diabetes type 2  Last A1c 5.1  POCT glucose checks  Hyperglycemia protocol    History of hypertension  Currently soft blood pressures  Midodrine 5 mg daily as needed if blood pressure less than 100/60  Monitor blood pressures    Glaucoma right eye   bromonidine 0.2% ophthalmic solution 1 drop BID Latanoprost 0.005% ophthalmic solution 1 drop  Nightly   Timolol 0.5% ophthalmic solution 1 drop BID     Pain control-Norco 5 to 7.5 every 6 hours as needed for moderate and severe pain  DVT ppx: Heparin 5000 units TID  GI ppx: none  Code Status: Full  Diet: Carb controlled -no straw use due to pharyngeal phase dysphagia    Disposition: Discharge after procedure if okay with specialist.  Giovany Cline MD  Family [id number]

## 2022-06-26 NOTE — PROGRESS NOTES
Dalia Cabral perfecrted serve whom is covering for Keith Lambert to notify about patients low bp. Ordered to recheck pressure in one hour. Dalia Cabral notified again about patients BP.  No new orders

## 2022-06-27 ENCOUNTER — ANESTHESIA (OUTPATIENT)
Dept: OPERATING ROOM | Age: 67
DRG: 177 | End: 2022-06-27
Payer: COMMERCIAL

## 2022-06-27 LAB
ANION GAP SERPL CALCULATED.3IONS-SCNC: 16 MMOL/L (ref 7–16)
BASOPHILS ABSOLUTE: 0.06 E9/L (ref 0–0.2)
BASOPHILS RELATIVE PERCENT: 1.3 % (ref 0–2)
BUN BLDV-MCNC: 60 MG/DL (ref 6–23)
CALCIUM SERPL-MCNC: 9.5 MG/DL (ref 8.6–10.2)
CHLORIDE BLD-SCNC: 98 MMOL/L (ref 98–107)
CO2: 23 MMOL/L (ref 22–29)
CREAT SERPL-MCNC: 5.8 MG/DL (ref 0.5–1)
EOSINOPHILS ABSOLUTE: 0.09 E9/L (ref 0.05–0.5)
EOSINOPHILS RELATIVE PERCENT: 2 % (ref 0–6)
GFR AFRICAN AMERICAN: 9
GFR NON-AFRICAN AMERICAN: 9 ML/MIN/1.73
GLUCOSE BLD-MCNC: 140 MG/DL (ref 74–99)
HCT VFR BLD CALC: 39 % (ref 34–48)
HEMOGLOBIN: 12.2 G/DL (ref 11.5–15.5)
IMMATURE GRANULOCYTES #: 0.03 E9/L
IMMATURE GRANULOCYTES %: 0.7 % (ref 0–5)
LYMPHOCYTES ABSOLUTE: 1.39 E9/L (ref 1.5–4)
LYMPHOCYTES RELATIVE PERCENT: 31 % (ref 20–42)
MAGNESIUM: 2.1 MG/DL (ref 1.6–2.6)
MCH RBC QN AUTO: 28.6 PG (ref 26–35)
MCHC RBC AUTO-ENTMCNC: 31.3 % (ref 32–34.5)
MCV RBC AUTO: 91.5 FL (ref 80–99.9)
METER GLUCOSE: 123 MG/DL (ref 74–99)
METER GLUCOSE: 127 MG/DL (ref 74–99)
METER GLUCOSE: 138 MG/DL (ref 74–99)
METER GLUCOSE: 185 MG/DL (ref 74–99)
MONOCYTES ABSOLUTE: 0.63 E9/L (ref 0.1–0.95)
MONOCYTES RELATIVE PERCENT: 14.1 % (ref 2–12)
NEUTROPHILS ABSOLUTE: 2.28 E9/L (ref 1.8–7.3)
NEUTROPHILS RELATIVE PERCENT: 50.9 % (ref 43–80)
PDW BLD-RTO: 18.4 FL (ref 11.5–15)
PHOSPHORUS: 6 MG/DL (ref 2.5–4.5)
PLATELET # BLD: 216 E9/L (ref 130–450)
PMV BLD AUTO: 11.6 FL (ref 7–12)
POTASSIUM REFLEX MAGNESIUM: 4.7 MMOL/L (ref 3.5–5)
RBC # BLD: 4.26 E12/L (ref 3.5–5.5)
SODIUM BLD-SCNC: 137 MMOL/L (ref 132–146)
WBC # BLD: 4.5 E9/L (ref 4.5–11.5)

## 2022-06-27 PROCEDURE — 7100000011 HC PHASE II RECOVERY - ADDTL 15 MIN: Performed by: SURGERY

## 2022-06-27 PROCEDURE — 7100000010 HC PHASE II RECOVERY - FIRST 15 MIN: Performed by: SURGERY

## 2022-06-27 PROCEDURE — 36415 COLL VENOUS BLD VENIPUNCTURE: CPT

## 2022-06-27 PROCEDURE — 2500000003 HC RX 250 WO HCPCS: Performed by: ANESTHESIOLOGIST ASSISTANT

## 2022-06-27 PROCEDURE — 6360000002 HC RX W HCPCS: Performed by: ANESTHESIOLOGIST ASSISTANT

## 2022-06-27 PROCEDURE — 6370000000 HC RX 637 (ALT 250 FOR IP): Performed by: SURGERY

## 2022-06-27 PROCEDURE — 87070 CULTURE OTHR SPECIMN AEROBIC: CPT

## 2022-06-27 PROCEDURE — 6370000000 HC RX 637 (ALT 250 FOR IP): Performed by: FAMILY MEDICINE

## 2022-06-27 PROCEDURE — 3700000001 HC ADD 15 MINUTES (ANESTHESIA): Performed by: SURGERY

## 2022-06-27 PROCEDURE — 6370000000 HC RX 637 (ALT 250 FOR IP): Performed by: SPECIALIST

## 2022-06-27 PROCEDURE — 82962 GLUCOSE BLOOD TEST: CPT

## 2022-06-27 PROCEDURE — 99231 SBSQ HOSP IP/OBS SF/LOW 25: CPT | Performed by: FAMILY MEDICINE

## 2022-06-27 PROCEDURE — 2580000003 HC RX 258: Performed by: SURGERY

## 2022-06-27 PROCEDURE — 84100 ASSAY OF PHOSPHORUS: CPT

## 2022-06-27 PROCEDURE — 1200000000 HC SEMI PRIVATE

## 2022-06-27 PROCEDURE — 6370000000 HC RX 637 (ALT 250 FOR IP)

## 2022-06-27 PROCEDURE — 94640 AIRWAY INHALATION TREATMENT: CPT

## 2022-06-27 PROCEDURE — 85025 COMPLETE CBC W/AUTO DIFF WBC: CPT

## 2022-06-27 PROCEDURE — 80048 BASIC METABOLIC PNL TOTAL CA: CPT

## 2022-06-27 PROCEDURE — 2580000003 HC RX 258: Performed by: FAMILY MEDICINE

## 2022-06-27 PROCEDURE — 6360000002 HC RX W HCPCS: Performed by: SURGERY

## 2022-06-27 PROCEDURE — 3700000000 HC ANESTHESIA ATTENDED CARE: Performed by: SURGERY

## 2022-06-27 PROCEDURE — 2500000003 HC RX 250 WO HCPCS: Performed by: SURGERY

## 2022-06-27 PROCEDURE — 2709999900 HC NON-CHARGEABLE SUPPLY: Performed by: SURGERY

## 2022-06-27 PROCEDURE — 3600000002 HC SURGERY LEVEL 2 BASE: Performed by: SURGERY

## 2022-06-27 PROCEDURE — 2580000003 HC RX 258

## 2022-06-27 PROCEDURE — 83735 ASSAY OF MAGNESIUM: CPT

## 2022-06-27 PROCEDURE — 3600000012 HC SURGERY LEVEL 2 ADDTL 15MIN: Performed by: SURGERY

## 2022-06-27 PROCEDURE — 0JPT0WZ REMOVAL OF TOTALLY IMPLANTABLE VASCULAR ACCESS DEVICE FROM TRUNK SUBCUTANEOUS TISSUE AND FASCIA, OPEN APPROACH: ICD-10-PCS | Performed by: SURGERY

## 2022-06-27 PROCEDURE — 94668 MNPJ CHEST WALL SBSQ: CPT

## 2022-06-27 PROCEDURE — 90935 HEMODIALYSIS ONE EVALUATION: CPT | Performed by: INTERNAL MEDICINE

## 2022-06-27 PROCEDURE — 6360000002 HC RX W HCPCS

## 2022-06-27 RX ORDER — FENTANYL CITRATE 50 UG/ML
INJECTION, SOLUTION INTRAMUSCULAR; INTRAVENOUS PRN
Status: DISCONTINUED | OUTPATIENT
Start: 2022-06-27 | End: 2022-06-27 | Stop reason: SDUPTHER

## 2022-06-27 RX ORDER — CEFAZOLIN SODIUM 1 G/3ML
INJECTION, POWDER, FOR SOLUTION INTRAMUSCULAR; INTRAVENOUS
Status: DISPENSED
Start: 2022-06-27 | End: 2022-06-28

## 2022-06-27 RX ORDER — PROPOFOL 10 MG/ML
INJECTION, EMULSION INTRAVENOUS CONTINUOUS PRN
Status: DISCONTINUED | OUTPATIENT
Start: 2022-06-27 | End: 2022-06-27 | Stop reason: SDUPTHER

## 2022-06-27 RX ORDER — EPHEDRINE SULFATE/0.9% NACL/PF 50 MG/5 ML
SYRINGE (ML) INTRAVENOUS PRN
Status: DISCONTINUED | OUTPATIENT
Start: 2022-06-27 | End: 2022-06-27 | Stop reason: SDUPTHER

## 2022-06-27 RX ORDER — BUPIVACAINE HYDROCHLORIDE AND EPINEPHRINE 2.5; 5 MG/ML; UG/ML
INJECTION, SOLUTION EPIDURAL; INFILTRATION; INTRACAUDAL; PERINEURAL PRN
Status: DISCONTINUED | OUTPATIENT
Start: 2022-06-27 | End: 2022-06-27 | Stop reason: ALTCHOICE

## 2022-06-27 RX ADMIN — GABAPENTIN 200 MG: 100 CAPSULE ORAL at 20:08

## 2022-06-27 RX ADMIN — SODIUM CHLORIDE, PRESERVATIVE FREE 10 ML: 5 INJECTION INTRAVENOUS at 11:59

## 2022-06-27 RX ADMIN — Medication 2000 MG: at 17:34

## 2022-06-27 RX ADMIN — ISOSORBIDE MONONITRATE 30 MG: 30 TABLET, EXTENDED RELEASE ORAL at 11:56

## 2022-06-27 RX ADMIN — TIMOLOL MALEATE 1 DROP: 5 SOLUTION OPHTHALMIC at 20:09

## 2022-06-27 RX ADMIN — HEPARIN SODIUM 5000 UNITS: 10000 INJECTION INTRAVENOUS; SUBCUTANEOUS at 20:10

## 2022-06-27 RX ADMIN — DOXYCYCLINE HYCLATE 100 MG: 100 CAPSULE ORAL at 20:08

## 2022-06-27 RX ADMIN — IPRATROPIUM BROMIDE AND ALBUTEROL SULFATE 1 AMPULE: .5; 2.5 SOLUTION RESPIRATORY (INHALATION) at 21:05

## 2022-06-27 RX ADMIN — PROPOFOL 100 MCG/KG/MIN: 10 INJECTION, EMULSION INTRAVENOUS at 17:33

## 2022-06-27 RX ADMIN — Medication 10 MG: at 17:42

## 2022-06-27 RX ADMIN — SODIUM CHLORIDE: 9 INJECTION, SOLUTION INTRAVENOUS at 17:44

## 2022-06-27 RX ADMIN — Medication 10 MG: at 17:39

## 2022-06-27 RX ADMIN — LATANOPROST 1 DROP: 50 SOLUTION OPHTHALMIC at 20:09

## 2022-06-27 RX ADMIN — BRIMONIDINE TARTRATE 1 DROP: 2 SOLUTION/ DROPS OPHTHALMIC at 12:00

## 2022-06-27 RX ADMIN — PANTOPRAZOLE SODIUM 20 MG: 20 TABLET, DELAYED RELEASE ORAL at 05:34

## 2022-06-27 RX ADMIN — VANCOMYCIN HYDROCHLORIDE 1000 MG: 1 INJECTION, POWDER, LYOPHILIZED, FOR SOLUTION INTRAVENOUS at 18:02

## 2022-06-27 RX ADMIN — DOXYCYCLINE HYCLATE 100 MG: 100 CAPSULE ORAL at 11:56

## 2022-06-27 RX ADMIN — FENTANYL CITRATE 50 MCG: 50 INJECTION, SOLUTION INTRAMUSCULAR; INTRAVENOUS at 17:25

## 2022-06-27 RX ADMIN — IPRATROPIUM BROMIDE AND ALBUTEROL SULFATE 1 AMPULE: .5; 2.5 SOLUTION RESPIRATORY (INHALATION) at 12:25

## 2022-06-27 RX ADMIN — GABAPENTIN 200 MG: 100 CAPSULE ORAL at 11:51

## 2022-06-27 RX ADMIN — BRIMONIDINE TARTRATE 1 DROP: 2 SOLUTION/ DROPS OPHTHALMIC at 20:09

## 2022-06-27 RX ADMIN — Medication 1 CAPSULE: at 11:56

## 2022-06-27 RX ADMIN — ASPIRIN 81 MG CHEWABLE TABLET 81 MG: 81 TABLET CHEWABLE at 11:56

## 2022-06-27 RX ADMIN — MIDODRINE HYDROCHLORIDE 5 MG: 5 TABLET ORAL at 06:37

## 2022-06-27 RX ADMIN — HYDROCODONE BITARTRATE AND ACETAMINOPHEN 1 TABLET: 7.5; 325 TABLET ORAL at 06:53

## 2022-06-27 RX ADMIN — TIMOLOL MALEATE 1 DROP: 5 SOLUTION OPHTHALMIC at 12:00

## 2022-06-27 RX ADMIN — SODIUM CHLORIDE, PRESERVATIVE FREE 10 ML: 5 INJECTION INTRAVENOUS at 21:18

## 2022-06-27 ASSESSMENT — ENCOUNTER SYMPTOMS: DYSPNEA ACTIVITY LEVEL: AFTER AMBULATING 1 FLIGHT OF STAIRS

## 2022-06-27 ASSESSMENT — LIFESTYLE VARIABLES: SMOKING_STATUS: 0

## 2022-06-27 ASSESSMENT — PAIN SCALES - GENERAL
PAINLEVEL_OUTOF10: 5
PAINLEVEL_OUTOF10: 7

## 2022-06-27 NOTE — CARE COORDINATION
Spoke to Estefany at [facility]- pt is a bedhold, however they do have insurance auth for patient that is good through tomorrow. Do not need to wait for precert upon discharge. Pt will need negative Covid on day of discharge. Pt NPO for Mediport removal today- await clinical progress/further plan of care.

## 2022-06-27 NOTE — ANESTHESIA PRE PROCEDURE
Department of Anesthesiology  Preprocedure Note       Name:  Khushbu Franco   Age:  78 y.o.  :  1955                                          MRN:  [id number]         Date:  2022      Surgeon: Kathy Hurst):  Oneil Hamman, MD    Procedure: PORT REMOVAL      Medications prior to admission:   Prior to Admission medications    Medication Sig Start Date End Date Taking? Authorizing Provider   vancomycin (VANCOCIN) infusion Infuse 1,000 mg intravenously three times a week for 28 days Compound per protocol. 22  Eleanor Kolb, APRN - CNP   doxycycline hyclate (VIBRAMYCIN) 100 MG capsule Take 1 capsule by mouth every 12 hours 6/22/22 8/3/22  Marga Finney, APRN - CNP   aspirin 81 MG EC tablet Take 1 tablet by mouth daily 22  Lazaro Murray MD   midodrine (PROAMATINE) 5 MG tablet Take 5 mg by mouth daily as needed    Historical Provider, MD   gabapentin (NEURONTIN) 100 MG capsule Take 2 capsules by mouth 3 times daily for 180 days.  3/24/22 9/20/22  Josephine Cardona MD   atorvastatin (LIPITOR) 80 MG tablet Take 80 mg by mouth nightly    Historical Provider, MD   bumetanide (BUMEX) 2 MG tablet Take 2 mg by mouth three times a week Given ,Tuesday,Thursday    Historical Provider, MD   isosorbide mononitrate (IMDUR) 30 MG extended release tablet Take 30 mg by mouth daily    Historical Provider, MD   insulin glargine (LANTUS) 100 UNIT/ML injection vial Inject 5 Units into the skin nightly     Historical Provider, MD   lidocaine-prilocaine (EMLA) 2.5-2.5 % cream Apply topically as needed for Pain     Historical Provider, MD   metoprolol succinate (TOPROL XL) 25 MG extended release tablet Take 1 tablet by mouth daily 21   Keyanna Howard MD   lanthanum (FOSRENOL) 1000 MG chewable tablet Take 1,000 mg by mouth 3 times daily (with meals)  21   Historical Provider, MD   allopurinol (ZYLOPRIM) 100 MG tablet Take 1 tablet by mouth daily 21   Keyanna Howard MD ticagrelor (BRILINTA) 90 MG TABS tablet Take 1 tablet by mouth 2 times daily 9/7/21   MD DESTINEE Green Complex-C-Folic Acid (BONI CAPS) 1 MG CAPS Take 1 mg by mouth nightly     Historical Provider, MD   omeprazole (PRILOSEC) 20 MG delayed release capsule Take 20 mg by mouth daily as needed     Historical Provider, MD MCHUGH 0.01 % SOLN ophthalmic drops Place 1 drop into the right eye nightly  6/9/20   Historical Provider, MD   COMBIGAN 0.2-0.5 % ophthalmic solution Place 1 drop into the right eye every 12 hours  8/1/19   Historical Provider, MD       Current medications:    No current facility-administered medications for this visit. Current Outpatient Medications   Medication Sig Dispense Refill    vancomycin (VANCOCIN) infusion Infuse 1,000 mg intravenously three times a week for 28 days Compound per protocol.  12 g 0    doxycycline hyclate (VIBRAMYCIN) 100 MG capsule Take 1 capsule by mouth every 12 hours 84 capsule 0     Facility-Administered Medications Ordered in Other Visits   Medication Dose Route Frequency Provider Last Rate Last Admin    [START ON 6/29/2022] vancomycin 1000 mg IVPB in 250 mL D5W addavial  1,000 mg IntraVENous Q MWF Marga Finney APRN - CNP        cefepime (MAXIPIME) 2000 mg IVPB minibag  2,000 mg IntraVENous See Admin Instructions Marga Finney APRN - FLAKO        vancomycin 1000 mg IVPB in 250 mL D5W addavial  1,000 mg IntraVENous See Admin Instructions Smitha Lopes APRN - CNP        lidocaine 4 % external patch 1 patch  1 patch TransDERmal Daily Fransisca Salazar V DO   1 patch at 06/27/22 1156    lactobacillus (CULTURELLE) capsule 1 capsule  1 capsule Oral Daily Gabriel Ryan MD   1 capsule at 06/27/22 1156    HYDROcodone-acetaminophen (NORCO) 7.5-325 MG per tablet 1 tablet  1 tablet Oral Q6H PRN Fransisca Marie V, DO   1 tablet at 06/27/22 0653    Or    HYDROcodone-acetaminophen (NORCO) 5-325 MG per tablet 1 tablet  1 tablet Oral Q6H PRN Teresa Pop, DO   1 tablet at 06/24/22 2149    gabapentin (NEURONTIN) capsule 200 mg  200 mg Oral TID Emelyn Coronadois V, DO   200 mg at 06/27/22 1151    alteplase (CATHFLO) injection 2 mg  2 mg IntraCATHeter Once Emelyn Groge V, DO        guaiFENesin tablet 400 mg  400 mg Oral 4x Daily PRN Fina Song MD        midodrine (PROAMATINE) tablet 5 mg  5 mg Oral Daily PRN Leidy Rivas, DO   5 mg at 06/27/22 5067    ipratropium-albuterol (DUONEB) nebulizer solution 1 ampule  1 ampule Inhalation 4x daily Grisel Ryan MD   1 ampule at 06/27/22 1225    doxycycline hyclate (VIBRAMYCIN) capsule 100 mg  100 mg Oral 2 times per day GOYO Cole - CNP   100 mg at 06/27/22 1156    bumetanide (BUMEX) tablet 2 mg  2 mg Oral Once per day on Tue Thu Sat Emelyn Gorge V, DO   2 mg at 06/25/22 0804    isosorbide mononitrate (IMDUR) extended release tablet 30 mg  30 mg Oral Daily Leidy Rivas, DO   30 mg at 06/27/22 1156    latanoprost (XALATAN) 0.005 % ophthalmic solution 1 drop  1 drop Right Eye Nightly Leidy Rivas, DO   1 drop at 06/26/22 1937    metoprolol succinate (TOPROL XL) extended release tablet 25 mg  25 mg Oral Daily Leidy Rivas, DO   25 mg at 06/26/22 0830    pantoprazole (PROTONIX) tablet 20 mg  20 mg Oral QAM AC Leidy Rivas, DO   20 mg at 06/27/22 0534    [Held by provider] ticagrelor (BRILINTA) tablet 90 mg  90 mg Oral BID Leidy Rivas, DO   90 mg at 06/22/22 1128    sodium chloride flush 0.9 % injection 5-40 mL  5-40 mL IntraVENous 2 times per day Leidy Rivas, DO   10 mL at 06/27/22 1159    sodium chloride flush 0.9 % injection 5-40 mL  5-40 mL IntraVENous PRN Leidy Rivsa, DO   10 mL at 06/21/22 1253    0.9 % sodium chloride infusion   IntraVENous PRN Leidy Rivas, DO 25 mL/hr at 06/19/22 2148 25 mL at 06/19/22 2148    ondansetron (ZOFRAN-ODT) disintegrating tablet 4 mg  4 mg Oral Q8H PRN Leidy Rivas, DO   4 mg at 06/22/22 0511    Or    ondansetron [facility]) injection 4 mg  4 mg IntraVENous Q6H PRN Leidy A Bosak, DO        polyethylene glycol (GLYCOLAX) packet 17 g  17 g Oral Daily PRN Leidy A Bosak, DO        lanthanum (FOSRENOL) chewable tablet 1,000 mg  1,000 mg Oral TID  Leidy A Bosak, DO   1,000 mg at 06/18/22 0944    acetaminophen (TYLENOL) tablet 650 mg  650 mg Oral Q6H PRN Leidy A Bosak, DO   650 mg at 06/22/22 0511    Or    acetaminophen (TYLENOL) suppository 650 mg  650 mg Rectal Q6H PRN Leidy A Bosak, DO        heparin (porcine) injection 5,000 Units  5,000 Units SubCUTAneous TID Aleman Maryba A Bosak, DO   5,000 Units at 06/26/22 1934    glucose chewable tablet 16 g  4 tablet Oral PRN Leidy A Bosak, DO        dextrose bolus 10% 125 mL  125 mL IntraVENous PRN Leidy A Bosak, DO        Or    dextrose bolus 10% 250 mL  250 mL IntraVENous PRN Leidy A Bosak, DO        glucagon (rDNA) injection 1 mg  1 mg IntraMUSCular PRN Leidy A Bosak, DO        dextrose 5 % solution  100 mL/hr IntraVENous PRN Leidy A Bosak, DO        brimonidine (ALPHAGAN) 0.2 % ophthalmic solution 1 drop  1 drop Right Eye BID Leidy A Bosak, DO   1 drop at 06/27/22 1200    timolol (TIMOPTIC) 0.5 % ophthalmic solution 1 drop  1 drop Right Eye BID Leidy A Bosak, DO   1 drop at 06/27/22 1200    aspirin chewable tablet 81 mg  81 mg Oral Daily Leidy A Bosak, DO   81 mg at 06/27/22 1156       Allergies: Allergies   Allergen Reactions    Furosemide Swelling and Other (See Comments)     Patient states \"I swelled up like a pig. \" states her kidneys shut down         Problem List:    Patient Active Problem List   Diagnosis Code    Diabetic retinopathy ([address] E11.319    Malignant neoplasm of right female breast ([facility] C50.911    Atherosclerosis of native coronary artery of native heart without angina pectoris I25.10    Moderate obesity E66.8    Left ventricular hypertrophy I51.7    Herniated lumbar intervertebral disc M51.26    Lumbar degenerative disc disease M51.36    Pseudomeningocele G96.198    Lumbar radiculopathy M54.16    Lymphedema of arm I89.0    Anemia of chronic disease D63.8    Chronic diastolic CHF (congestive heart failure) ([facility]) I50.32    Hypertension I10    Glaucoma H40.9    Refusal of blood product Z78.9    Elevated troponin R77.8    Controlled type 2 diabetes mellitus with chronic kidney disease on chronic dialysis, with long-term current use of insulin ([facility]) E11.22, N18.6, Z79.4, Z99.2    Vitreous hemorrhage ([facility] H43.10    Patient is Yarsani Z78.9    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 ([facility]) E11.649    Chronic pain syndrome G89.4    Lumbar post-laminectomy syndrome M96.1    Cervicalgia M54.2    Diabetic peripheral neuropathy ([facility]) E11.42    ESRD (end stage renal disease) ([facility]) N18.6    Bilateral carpal tunnel syndrome G56.03    Cardiac arrest ([facility]) I46.9    ESRD (end stage renal disease) on dialysis ([facility]) N18.6, Z99.2    Mixed hyperlipidemia E78.2    Lymphedema of right upper extremity I89.0    Coronary artery disease involving native coronary artery of native heart with angina pectoris ([facility]) I25.119    Mitral valve disease I05.9    CAD in native artery I25.10    Lumbar stenosis without neurogenic claudication M48.061    Intractable low back pain M54.59    Unable to ambulate R26.2    NSTEMI (non-ST elevated myocardial infarction) ([facility]) I21.4    Moderate protein-calorie malnutrition ([facility]) E44.0    AMS (altered mental status) R41.82    Ischemic cardiomyopathy I25.5    Healthcare-associated pneumonia J18.9    Hypoxia R09.02    Acute respiratory failure with hypoxia ([facility]) J96.01    Lower abdominal pain R10.30    Pain R52       Past Medical History:        Diagnosis Date    Acute infection of bone ([facility])     infection of rt foot, resolved.     Acute osteomyelitis of phalanx of left hand ([facility] 1/27/2022    Left third distal phalanx    Anemia of chronic disease     Arthritis     Breast cancer ([address]     right breast, 2008/ bladder, 2006- last chemotherapy \"years ago\"    CAD (coronary artery disease)     Carpal tunnel syndrome     bilat - for OR left hand 3-17-20     Chronic diastolic CHF (congestive heart failure) ([address] 09/23/2014 9/23/14- echocardiogram revealed moderate LV concentric hypertrophy, stage III diastolic dysfunction, mild tricuspid regurgitation    CKD (chronic kidney disease) stage 4, GFR 15-29 ml/min ([facility])     Diabetic retinopathy ([address]     Glaucoma     Hemodialysis patient ([address]     [facility] wed fri- Dr. Herminia Nguyen - left arm fistula     Hyperkalemia, diminished renal excretion 11/9/2017    Hyperlipidemia     Hypertension     Hypoglycemia unawareness in type 1 diabetes mellitus ([address] 11/7/2017    Insulin dependent type 2 diabetes mellitus ([address]     Neuropathy     feet    Osteomyelitis due to secondary diabetes ([address]     rt great toe with amputation    Patient is Nondenominational 11/7/2017    Refusal of blood product     patient states she dose not take blood transfusion    Ventricular hypertrophy     Ventricular tachycardia ([address] 5/24/2021    Vitreous hemorrhage ([address]     left eye       Past Surgical History:        Procedure Laterality Date    AMPUTATION      right great toe    ANKLE SURGERY      correction on charcot joint of right ankle    BONE BIOPSY N/A 04/18/2022    BONE BIOPSY PERCUTANEOUS L1/L2 performed by Neo Baugh MD at [facility]  12/09/2021    Dr Vasyl Montague Left 03/17/2020    LEFT CARPAL TUNNEL RELEASE performed by Winston Jones MD at [address]      bilateral    CHOLECYSTECTOMY      COLONOSCOPY      CORONARY ANGIOPLASTY  05/05/2022    Dr. Funk Files - RCA angioplasty    Alejandro Esquivel Left 01/31/2018    upper arm/Dr. Jeri Oviedo    ECHO COMPL W DOP COLOR FLOW  02/14/2013         ECHO COMPLETE  09/17/2013  FINGER AMPUTATION Left 01/20/2022    AMPUTATION OF LEFT THIRD DIGIT, DISTAL PHALANX performed by Juany Lynn MD at [facility]      right    OTHER SURGICAL HISTORY  09/27/2011    PPV, membranectomy, laser Right eye    OTHER SURGICAL HISTORY  insertion lumbar drain insertion    10/12/`14    OTHER SURGICAL HISTORY  10/22/2015    percutaneous lead placement for spinal cord stimulator    OTHER SURGICAL HISTORY  11/03/2015    Spinal; cord stimulator- turned off as of 3-10-20     NM AV ANAST,UP ARM BASILIC VEIN TRANSPOSIT Left 05/15/2018    TRANSPOSITION STAGE II AV FISTULA - LEFT UPPER ARM performed by Zaheer Godoy MD at [facility] ANGIOACCESS AV FISTULA Left 09/25/2018    SUPERFICIALIZATION AV FISTULA - LEFT ARM performed by Zaheer Godoy MD at [facility] W/VITRECTOMY ANY METH Left 04/10/2018    PARS PLANA VITRECTOMY 25 GAUGE RETINAL DETACHMENT REPAIR air fluid exchange, endolaser performed by Carlos A Miller MD at [address] TRANSESOPHAGEAL ECHOCARDIOGRAM  11/11/2021    Dr. Eduardo Flores TRANSESOPHAGEAL ECHOCARDIOGRAM  04/19/2022        TUNNELED VENOUS CATHETER PLACEMENT  11/15/2017    VITRECTOMY Left 04/10/2018    PARS PLANA VITRECTOMY; RETINAL DETACHMENT REPAIR; GAS BUBBLE; LASER LEFT EYE       Social History:    Social History     Tobacco Use    Smoking status: Never Smoker    Smokeless tobacco: Never Used   Substance Use Topics    Alcohol use: No                                Counseling given: Not Answered      Vital Signs (Current): There were no vitals filed for this visit.                                            BP Readings from Last 3 Encounters:   06/27/22 (!) 102/44   05/11/22 (!) 115/45   05/02/22 (!) 107/39       NPO Status:  greater than 8 hours                                                                            BMI:   Wt Readings from Last 3 Encounters:   06/27/22 134 lb 11.2 oz (61.1 kg)   05/11/22 175 lb 0.7 oz (79.4 kg)   05/02/22 163 lb (73.9 kg)     There is no height or weight on file to calculate BMI.    CBC:   Lab Results   Component Value Date    WBC 4.5 06/27/2022    RBC 4.26 06/27/2022    HGB 12.2 06/27/2022    HCT 39.0 06/27/2022    MCV 91.5 06/27/2022    RDW 18.4 06/27/2022     06/27/2022       CMP:   Lab Results   Component Value Date     06/27/2022    K 4.7 06/27/2022    CL 98 06/27/2022    CO2 23 06/27/2022    BUN 60 06/27/2022    CREATININE 5.8 06/27/2022    GFRAA 9 06/27/2022    LABGLOM 9 06/27/2022    GLUCOSE 140 06/27/2022    GLUCOSE 46 04/02/2012    PROT 6.7 06/15/2022    CALCIUM 9.5 06/27/2022    BILITOT 0.7 06/15/2022    ALKPHOS 155 06/15/2022    AST 19 06/15/2022    ALT 16 06/15/2022       POC Tests: No results for input(s): POCGLU, POCNA, POCK, POCCL, POCBUN, POCHEMO, POCHCT in the last 72 hours. Coags:   Lab Results   Component Value Date    PROTIME 12.3 06/15/2022    PROTIME 10.4 02/15/2012    INR 1.1 06/15/2022    APTT 98.6 05/05/2022       HCG (If Applicable): No results found for: PREGTESTUR, PREGSERUM, HCG, HCGQUANT     ABGs: No results found for: PHART, PO2ART, WIF7YUV, LWQ8FZJ, BEART, M8FZYDSC     Type & Screen (If Applicable):  Lab Results   Component Value Date    LABABO O 08/30/2011    [address] 08/30/2011       Drug/Infectious Status (If Applicable):  No results found for: HIV, HEPCAB    COVID-19 Screening (If Applicable):   Lab Results   Component Value Date    COVID19 DETECTED 06/16/2022       CTA 12/07/2021  Impression   1. Marked coronary artery calcification with cardiomegaly. 2. A 1.5 cm ground-glass nodular density in the right middle lobe is new   since 2012 and could be inflammatory or low-grade neoplastic in etiology. Tiny solid-appearing nodules elsewhere measure up to 3 mm.  Please see the   recommendation section below. 3. Thyroid nodules measure up to 3 cm.  Please see the recommendation section   below.    4. There is a remote, unfused transverse fracture of the upper sternal body. 5. There are superficial varices in the soft tissues of the left ventral   chest.   6. Status post right mastectomy.  A suspected chronic seroma in the surgical   bed appears mildly increased since 2012. CARDIAC STRESS TEST 5/20/21     EF 25%  FINDINGS: The overall quality of the study was adequate.       Left ventricular cavity size was noted to be dilated during stress and   rest images       Rotational analog analysis demonstrated show no significant motion   artifact       The gated SPECT stress imaging in the short, vertical long, and   horizontal long axis demonstrated severe defect was present in the   apical wall(s) that was large sized by quantification.              There also was a severe defect present in the septal wall(s) that was   large sized by quantification.       There is also mild defect present in the small inferior apical that   are small in size       There is also a mild defect present in the anterior septal wall that   was small in size       The resting images partially reduced. The anterolateral wall only.       Gated SPECT left ventricular ejection fraction was calculated to be   25%, with apical and septal akinesis, moderate global hypokinesis.         Impression       The myocardial perfusion imaging was abnormal       The abnormality was a large fixed defect in the apical and septal   walls; small fixed defect in the inferoapical wall; partially   reversible defect in the small area of anterolateral wall.       Overall left ventricular systolic function was severely reduced, EF   25% with apical septal akinesis, moderate global hypokinesis. Dilated   left ventricle during stress and rest.       High risk myocardial perfusion study       Compared to previous study from August 2011 which showed large lateral   wall ischemia with EF 66%.           CARDIAC CATH 5/21/21  CONCLUSION:  1.   Coronary artery disease:  a. Left main:  20% eccentric mid vessel narrowing.  b.  LAD:  50% proximal vessel narrowing and 95% mid vessel stenosis. Trivial diagonal branch with 90% stenosis. c.  LCX:  Occluded after a small caliber first marginal branch which is  a chronic total occlusion. The second larger marginal branch filled  late. d.  RCA:  Large, dominant vessel with 90% heavily calcified mid vessel  stenosis. 50% disease at the level of the crux and 70% ostial stenosis  of the posterior descending artery branch. 2.  Markedly elevated left ventricular end-diastolic pressure. 3.  Successful balloon angioplasty with deployment of drug-eluting  coronary stent to the mid LAD with very good results. 4.  Successful balloon angioplasty with deployment of drug-eluting  coronary stent to the mid RCA with poststent deployment dilatation with  a larger high-pressure noncompliant balloon with good results.     EKG 04/13/2022  Narrative & Impression    Sinus bradycardia  Nonspecific T wave abnormality  Abnormal ECG  When compared with ECG of 10-MAR-2022 11:36,  Significant changes have occurred  Confirmed by Joshua Khanna ([id number]) on 4/13/2022 12:43:13 PM       ECHO 11/11/2021   Findings      Left Ventricle   Normal left ventricle size and systolic function. Right Ventricle   Normal right ventricular size and function. Left Atrium   Normal sized left atrium. There is no left atrial appendage thrombus. Agitated saline injected for shunt evaluation. No evidence of patent foramen ovale. Right Atrium   Normal right atrium size. Mitral Valve   Structurally normal anterior mitral leaflet with echodensity attached to   the ventricular side of the posterior leaflet(1.0x 1.2cm) with restriction   excursion. Mild centrally directed mitral regurgitation. Tricuspid Valve   The tricuspid valve appears structurally normal.   Mild tricuspid regurgitation. Aortic Valve   Structurally normal aortic valve. Pulmonic Valve   The pulmonic valve was not well visualized. Pulmonic valve is structurally normal.      Pericardial Effusion   No pericardial effusion. Aorta   Aortic root dimension within normal limits. No significant plaque noted in the descending aorta. Conclusions      Summary   Normal left ventricle size and systolic function. Structurally normal anterior mitral leaflet with echodensity attached to   the ventricular side of the posterior leaflet(1.0x 1.2cm) with restriction   excursion. Mild centrally directed mitral regurgitation. Mild tricuspid regurgitation. Signature        Anesthesia Evaluation  Patient summary reviewed and Nursing notes reviewed no history of anesthetic complications:   Airway: Mallampati: III  TM distance: <3 FB   Neck ROM: limited  Mouth opening: > = 3 FB   Dental:    (+) partials  Comment: Partials removed. Multiple missing and chipped teeth. Denies loose teeth. Pulmonary:   (+) decreased breath sounds     (-) pneumonia and not a current smoker          Patient did not smoke on day of surgery.                  Cardiovascular:  Exercise tolerance: poor (<4 METS),   (+) hypertension:, valvular problems/murmurs (papillary fibroelastoma of mitral valve):, past MI:, CAD:, CABG/stent (cardiac arrest 5/2021 s/p stent x1):, dysrhythmias: ventricular tachycardia, CHF: diastolic and systolic, FRENCH: after ambulating 1 flight of stairs, hyperlipidemia    (-)  angina    ECG reviewed  Rhythm: regular  Rate: normal  Echocardiogram reviewed  Stress test reviewed  Cleared by cardiology     Beta Blocker:  Dose within 24 Hrs      ROS comment: Hx. Cardiac arrest May 2021 with VT      W/u cabg active RCA stenosis     Neuro/Psych:   (+) neuromuscular disease (diabetic retinopathy, vitreous hemorrhage of left eye, glaucoma, herniated lumbar intervertebral disc, DDD, pseudomeningocele, chronic pain, myalgia):,              ROS comment: Lumbar radiculopathy/DDD/herniated

## 2022-06-27 NOTE — FLOWSHEET NOTE
Pt completed 3.5hrs of HD on a 3K bath with 2.9L of UF removed safely. 06/27/22 1050   Vital Signs   BP (!) 96/52   Temp 97.9 °F (36.6 °C)   Heart Rate 65   Resp 16   Weight 134 lb 11.2 oz (61.1 kg)   Weight Method Bed scale   Percent Weight Change -5.12   Pain Assessment   Pain Assessment None - Denies Pain   Post-Hemodialysis Assessment   Post-Treatment Procedures Blood returned; Access bleeding time < 10 minutes   Machine Disinfection Process Acid/Vinegar Clean;Heat Disinfect; Exterior Machine Disinfection   Rinseback Volume (ml) 300 ml   Total Liters Processed (l/min) 59.6 l/min   Dialyzer Clearance Moderately streaked   Duration of Treatment (minutes) 210 minutes   Hemodialysis Output (ml) 3200 ml   Tolerated Treatment Good   Patient Response to Treatment Tolerated well; stasis achieved; post report to [facility] RN   Bilateral Breath Sounds Clear   Edema Generalized   Edema Generalized Trace   Time Off 1047   Patient Disposition Return to room

## 2022-06-27 NOTE — DISCHARGE SUMMARY
also has a nonworking stimulator located in her lower right back, with sufficient pain control. She was also found to have mild to moderate pharyngeal phase dysphagia and was allowed to have a regular diet but avoid thin liquids with a straw. During admission patient had bilateral lower quadrant abdominal pain and was found to have colonic diverticulosis on KUB without any acute diverticulitis on CT contrast of the abdomen. Was found to have colonic diverticulosis. Postop day 1 patient has been having some dizziness and nausea, which improved throughout the day, and she will be discharged with some compazine and meclizine. Patient's chronic conditions remained stable. Patient was discharged in an improved and suitable condition. Patient was encouraged to follow-up with PCP. Physical Exam  Vitals and nursing note reviewed. Constitutional:       Appearance: Normal appearance. Comments: Resting comfortably   HENT:      Head: Normocephalic and atraumatic. Nose: Nose normal. No congestion or rhinorrhea. Eyes:      General: No scleral icterus. Conjunctiva/sclera: Conjunctivae normal.   Neck:      Vascular: No carotid bruit. Cardiovascular:      Rate and Rhythm: Normal rate and regular rhythm. Heart sounds: Murmur (2/6 systolic ) heard. No gallop. Pulmonary:      Effort: Pulmonary effort is normal.      Breath sounds: Normal breath sounds. No wheezing or rales. Comments:     Chest:   Breasts:      Right: Absent. Comments: Surgical scar closed and intact  Abdominal:      General: Bowel sounds are normal. There is no distension. Palpations: Abdomen is soft. There is no mass. Tenderness: There is no abdominal tenderness. There is no guarding or rebound. Musculoskeletal:         General: Tenderness (Rt lower back where the pain pump located) present. Normal range of motion. Cervical back: Normal range of motion and neck supple. Right lower leg: No edema. Left lower leg: No edema. Comments:  Middle distal phlanx amputed. Right lymphedema    Skin:     Coloration: Skin is not jaundiced. Comments: Port on right chest dressed appropriately    Multiple surgical scars healed scabs located throughout patient's back. Neurological:      General: No focal deficit present. Mental Status: She is alert and oriented to person, place, and time. Psychiatric:         Mood and Affect: Mood normal.       Post Discharge Follow Up Issues: Follow-up with ID and neurosurgery. Discharged Condition: stable    Significant Diagnostic Studies:   CT ABDOMEN PELVIS W IV CONTRAST Additional Contrast? None   Final Result   1. Pattern of generalized anasarca. 2.  No conspicuous localized inflammatory process in the mid mesentery fat   planes. No signs for acute diverticulitis. No bowel obstruction. No free   intraperitoneal air. The no ascites. 3.  Findings for inflammatory process/discitis in the levels of L1-L2 L3   which have been previously described. 4.  Previous spinal fusion of L3/L4/L5 which has been previously described. 5.  Edema with generalized anasarca correlate with volume overload and   chronic renal failure. The extensive vascular arterial calcifications   correlate also with chronic renal failure. RECOMMENDATIONS:   Unavailable         XR ABDOMEN (KUB) (SINGLE AP VIEW)   Final Result   Colonic diverticulosis. Fluoroscopy modified barium swallow with video   Final Result   1. No barium aspiration or significant swallowing deficits. 2.  Transient laryngeal penetration with thin liquid barium. 3.  Cervical spine degenerative spondylosis. 4.  Please see separate speech pathology report for full discussion of   findings and recommendations. CT HEAD WO CONTRAST   Final Result   No acute intracranial abnormality. CT CHEST WO CONTRAST   Final Result   1.  New ground-glass and airspace consolidations throughout right lung notable   in right lower lobe as well as minimal opacities in left lower lobe   suggesting interstitial pulmonary edema or bilateral pneumonia. 2. Cardiomegaly with pulmonary vascular congestion. 3. Multiple stable prominent mediastinal lymph nodes. XR CHEST PORTABLE   Final Result   No acute process. Disposition: long term care facility    Patient Instructions:      Medication List        START taking these medications      doxycycline hyclate 100 MG capsule  Commonly known as: VIBRAMYCIN  Take 1 capsule by mouth every 12 hours     vancomycin  infusion  Commonly known as: VANCOCIN  Infuse 1,000 mg intravenously three times a week for 28 days Compound per protocol. ASK your doctor about these medications      allopurinol 100 MG tablet  Commonly known as: ZYLOPRIM  Take 1 tablet by mouth daily     amoxicillin-clavulanate 600-42.9 MG/5ML suspension  Commonly known as: AUGMENTIN-ES  Take 10 mLs by mouth every 12 hours for 3 days  Ask about: Should I take this medication?     aspirin 81 MG EC tablet  Take 1 tablet by mouth daily     atorvastatin 80 MG tablet  Commonly known as: LIPITOR     Bumex 2 MG tablet  Generic drug: bumetanide     Combigan 0.2-0.5 % ophthalmic solution  Generic drug: brimonidine-timolol     gabapentin 100 MG capsule  Commonly known as: NEURONTIN  Take 2 capsules by mouth 3 times daily for 180 days.      insulin glargine 100 UNIT/ML injection vial  Commonly known as: LANTUS     isosorbide mononitrate 30 MG extended release tablet  Commonly known as: IMDUR     lanthanum 1000 MG chewable tablet  Commonly known as: FOSRENOL     lidocaine-prilocaine 2.5-2.5 % cream  Commonly known as: EMLA     Lumigan 0.01 % Soln ophthalmic drops  Generic drug: bimatoprost     metoprolol succinate 25 MG extended release tablet  Commonly known as: TOPROL XL  Take 1 tablet by mouth daily     midodrine 5 MG tablet  Commonly known as: PROAMATINE omeprazole 20 MG delayed release capsule  Commonly known as: PRILOSEC     Jose Caps 1 MG Caps     ticagrelor 90 MG Tabs tablet  Commonly known as: BRILINTA  Take 1 tablet by mouth 2 times daily               Where to Get Your Medications        These medications were sent to [address] [address]      Phone: [phone]   amoxicillin-clavulanate 600-42.9 MG/5ML suspension  doxycycline hyclate 100 MG capsule       You can get these medications from any pharmacy    Bring a paper prescription for each of these medications  vancomycin  infusion       Activity: activity as tolerated  Diet: regular diet    [place] [address] [address]  [facility] [address] [phone]    Schedule an appointment as soon as possible for a visit in 2 weeks        Signed:  Zain Vega MD  6/27/2022  11:07 AM

## 2022-06-27 NOTE — PROGRESS NOTES
P Quality Flow/Interdisciplinary Rounds Progress Note        Quality Flow Rounds held on June 27, 2022    Disciplines Attending:  Bedside Nurse, ,  and Nursing Unit Leadership    Marcus Beltran was admitted on 6/15/2022  6:13 PM    Anticipated Discharge Date:       Disposition:    Minor Score:  Minor Scale Score: 19    Readmission Risk              Risk of Unplanned Readmission:  41           Discussed patient goal for the day, patient clinical progression, and barriers to discharge.   The following Goal(s) of the Day/Commitment(s) have been identified:  hd and mediport removal.      Leigh Bundy RN  June 27, 2022

## 2022-06-27 NOTE — OP NOTE
Operative Note    William Bates  YOB: 1955  [id number]    Pre-operative Diagnosis: Mediport with skin erosion    Post-operative Diagnosis: Same    Procedure(s):  PORT REMOVAL    * No implants in log *      Surgeon:   Primary: Darion Packer MD    Staff:  Scrub Person First: Freida Block    Anesthesia:   Monitor Anesthesia Care    Estimated Blood Loss: less than 50     Complications: None    Specimens:   ID Type Source Tests Collected by Time Destination   1 : mediport tip culture Catheter Tip Catheter Tip CULTURE, TIP Darion Packer MD 6/27/2022 1494        Findings: Port with catheter removed intact, overlying skin ischemic      Indications: Patient is a 78 y.o. female who was diagnosed with the above. Risks/Benefits/Alternatives were discussed with the patient, including bleeding, infection, iatrogenic injuries and other complications. The patient agreed to undergo the procedure and informed consent was obtained. Procedure: After informed consent, the patient was brought to the operating room and placed supine. MAC anesthesia was then induced which the patient tolerated well. Time out was performed to identify the correct patient and procedure. They received appropriate perioperative antibiotics. The right chest was prepped and draped in the usual sterile fashion. The skin around the port was anesthetized. The remaining skin overlying the port appeared ischemic and nonviable. We excised this area of skin. The port was then elevated out of the wound. It was not anchored. We then slowly removed the catheter. The catheter was removed with tip appearing intact. The tip was cut off and sent for culture. 3-0 Vicryl suture was placed over the tract site. The capsule was then excised with cautery. Moist gauze dressing was then placed with a sterile dry dressing over top. Counts were correct at the end of the case.     Tolerated procedure well    I was present and scrubbed for the procedure.     Viviana Lopez MD  06/27/22  5:46 PM

## 2022-06-27 NOTE — PROGRESS NOTES
[address] [place] [address] [facility]  [facility]  Progress Note    SUBJECTIVE:  Chief Complaint   Patient presents with    Shortness of Breath     for a few days per pt    Altered Mental Status     per NH staff, pt able to answer all orientation questions     Patient is tolerating medications. No reported adverse drug reactions. No nausea, vomiting, diarrhea. Feeling well, no complaints  No fevers    Review of systems:  As stated above in the chief complaint, otherwise negative.     Medications:  Scheduled Meds:   [START ON 6/29/2022] vancomycin  1,000 mg IntraVENous Q MWF    cefepime  2,000 mg IntraVENous See Admin Instructions    vancomycin  1,000 mg IntraVENous See Admin Instructions    lidocaine  1 patch TransDERmal Daily    lactobacillus  1 capsule Oral Daily    gabapentin  200 mg Oral TID    alteplase  2 mg IntraCATHeter Once    ipratropium-albuterol  1 ampule Inhalation 4x daily    doxycycline hyclate  100 mg Oral 2 times per day    bumetanide  2 mg Oral Once per day on Tue Thu Sat    isosorbide mononitrate  30 mg Oral Daily    latanoprost  1 drop Right Eye Nightly    metoprolol succinate  25 mg Oral Daily    pantoprazole  20 mg Oral QAM AC    [Held by provider] ticagrelor  90 mg Oral BID    sodium chloride flush  5-40 mL IntraVENous 2 times per day    [Held by provider] lanthanum  1,000 mg Oral TID WC    heparin (porcine)  5,000 Units SubCUTAneous TID    brimonidine  1 drop Right Eye BID    timolol  1 drop Right Eye BID    aspirin  81 mg Oral Daily     Continuous Infusions:   sodium chloride 25 mL (06/19/22 2323)    dextrose       PRN Meds:HYDROcodone-acetaminophen **OR** HYDROcodone 5 mg - acetaminophen, guaiFENesin, midodrine, sodium chloride flush, sodium chloride, ondansetron **OR** ondansetron, polyethylene glycol, acetaminophen **OR** acetaminophen, glucose, dextrose bolus **OR** dextrose bolus, glucagon (rDNA), dextrose    OBJECTIVE:  BP (!) 119/22   Pulse 60   Temp 98 °F (36.7 °C)   Resp 16 Ht 5' 10\" (1.778 m)   Wt 141 lb 15.6 oz (64.4 kg)   SpO2 99%   BMI 20.37 kg/m²   Temp  Av.7 °F (36.5 °C)  Min: 97.4 °F (36.3 °C)  Max: 98 °F (36.7 °C)  Constitutional: The patient is awake, alert, and oriented. Laying in bed  Skin: Warm and dry. No rashes were noted. HEENT: Round and reactive pupils. Moist mucous membranes. No ulcerations or thrush. Neck: Supple to movements. Chest: No use of accessory muscles to breathe. Symmetrical expansion. No wheezing, crackles. R mastectomy. Clear throughout. Cardiovascular: S1 and S2 are rhythmic and regular. No murmurs appreciated. Abdomen: Positive bowel sounds to auscultation. Benign to palpation. No masses felt. Extremities lymphedema  postmastectomy on the right.  Left arm AV fistula   Lines:  R chest Mediport - de-accessed due to exposure (see image)      Laboratory and Tests Review:  Lab Results   Component Value Date    WBC 4.5 2022    WBC 3.7 (L) 2022    WBC 5.0 2022    HGB 12.2 2022    HCT 39.0 2022    MCV 91.5 2022     2022     Lab Results   Component Value Date    NEUTROABS 2.28 2022    NEUTROABS 1.61 (L) 2022    NEUTROABS 2.97 2022     Lab Results   Component Value Date    CRPHS 0.7 2016    CRPHS 2.6 2016    CRPHS 7.6 (H) 2015     Lab Results   Component Value Date    ALT 16 06/15/2022    AST 19 06/15/2022    ALKPHOS 155 (H) 06/15/2022    BILITOT 0.7 06/15/2022     Lab Results   Component Value Date     2022    K 4.7 2022    CL 98 2022    CO2 23 2022    BUN 60 2022    CREATININE 5.8 2022    CREATININE 4.7 2022    CREATININE 4.3 2022    GFRAA 9 2022    LABGLOM 9 2022    GLUCOSE 140 2022    GLUCOSE 46 2012    PROT 6.7 06/15/2022    LABALBU 3.5 06/15/2022    LABALBU 3.8 2012    CALCIUM 9.5 2022    BILITOT 0.7 06/15/2022    ALKPHOS 155 06/15/2022    AST 19 06/15/2022    ALT 16 06/15/2022     Lab Results   Component Value Date    CRP 7.7 (H) 04/13/2022    CRP 0.7 (H) 01/20/2022    CRP 1.6 (H) 12/20/2021     Lab Results   Component Value Date    SEDRATE 30 (H) 06/15/2022    SEDRATE 81 (H) 04/13/2022    SEDRATE 19 01/20/2022     Radiology:  Reviewed    Microbiology:   Respiratory Culture 6/16/22: few PMNL, moderate GPC in pairs  Blood Cultures 6/15/22: negative   COVID-19 PCR positive on respiratory array  Stools: pending    ASSESSMENT:  Aspiration pneumonia pneumonia/HCAP- improved  Vertebral osteomyelitis and infected hardware lumbar spine-suppressive doxycycline ongoing; just finished 6 weeks of Vanco with hemodialysis- now on suppression c doxy  Respiratory viral panel positive for COVID by PCR; rapid test was negative but clinically CAT scan reflects bacterial pneumonia at this point I believe the COVID is asymptomatic. Mild leukopenia    PLAN:  Doxycycline suppression for Vertebral Osteo  1g Vanco MWF with HD treatment for 4 weeks - script in chart  Mediport removal today  Speech following: video swallow completed-laryngeal penetration with thin liquids  Labs and cultures reviewed   DC plan nick - okay to DC from ID POV - scripts on chart    Susan Matos, GOYO - CNP  10:32 AM  6/27/2022   Pt seen and examined. Above discussed agree with advanced practice nurse. Labs, cultures, and radiographs reviewed. Face to Face encounter occurred. Changes made as necessary.      Linda Almanzar MD

## 2022-06-27 NOTE — PROGRESS NOTES
Occupational Therapy  Patient treatment attempted this AM.  Patient not available due to dialysis, will continue OT POC as able and appropriate.     Linda DOLAN/TEMI [id number]

## 2022-06-27 NOTE — PROGRESS NOTES
Department of Internal Medicine  Nephrology Attending Progress Note        SUBJECTIVE:  Mrs Niru Rosado seen on dialysis. No longer short of breath or requiring oxygen therapy. .  No longer confused    PMH   End-stage renal disease on dialysis Monday Wednesday Friday  Hypertension  Hyperlipidemia  Type 2 diabetes with neuropathy, retinopathy  Anemia of chronic kidney disease (patient Jehovah witness)  Secondary hyperparathyroid disease of renal origin  History of breast cancer rt side  History of coronary disease status post heart cath with balloon angioplasty 5/5  Carpal tunnel syndrome with bilateral release  History of osteomyelitis/discitis  L1-L2 on IV Vanco for 6 weeks now on suppressive doxycycline  History of spinal fusion of L3/L4/L5  History of glaucoma    Physical Exam:    Vitals:    06/27/22 1141   BP: (!) 102/44   Pulse:    Resp:    Temp:    SpO2:        I/O last 24 hours:  Intake/Output none documented:    Weight: 134 postdialysis    General: Awake, alert, no acute distress  Neck: No JVD noted  Lungs: Diminished at the bases bilaterally. CV: Regular rate and rhythm. No rub  Abd: Soft, nontender, nondistended. Active bowel sounds  Skin: Warm and dry. No rash on exposed extremities  Ext: 1+ RUE edema (h/o breast CA);  No BLE edema ; LUE AV fistula   Neuro: Awake, answers questions appropriately    DATA:    CBC with Differential:    Lab Results   Component Value Date    WBC 4.5 06/27/2022    RBC 4.26 06/27/2022    HGB 12.2 06/27/2022    HCT 39.0 06/27/2022     06/27/2022    MCV 91.5 06/27/2022    MCH 28.6 06/27/2022    MCHC 31.3 06/27/2022    RDW 18.4 06/27/2022    NRBC 0.0 02/18/2017    SEGSPCT 70 03/12/2014    BANDSPCT 1 02/18/2015    LYMPHOPCT 31.0 06/27/2022    PROMYELOPCT 1.8 02/18/2017    MONOPCT 14.1 06/27/2022    MYELOPCT 0.9 10/24/2017    BASOPCT 1.3 06/27/2022    MONOSABS 0.63 06/27/2022    LYMPHSABS 1.39 06/27/2022    EOSABS 0.09 06/27/2022    BASOSABS 0.06 06/27/2022     BMP:    Lab Results   Component Value Date     06/27/2022    K 4.7 06/27/2022    CL 98 06/27/2022    CO2 23 06/27/2022    BUN 60 06/27/2022    LABALBU 3.5 06/15/2022    LABALBU 3.8 04/02/2012    CREATININE 5.8 06/27/2022    CALCIUM 9.5 06/27/2022    GFRAA 9 06/27/2022    LABGLOM 9 06/27/2022    GLUCOSE 140 06/27/2022    GLUCOSE 46 04/02/2012     Magnesium:    Lab Results   Component Value Date    MG 2.1 06/27/2022     Phosphorus:    Lab Results   Component Value Date    PHOS 6.0 06/27/2022          [START ON 6/29/2022] vancomycin  1,000 mg IntraVENous Q MWF    cefepime  2,000 mg IntraVENous See Admin Instructions    vancomycin  1,000 mg IntraVENous See Admin Instructions    lidocaine  1 patch TransDERmal Daily    lactobacillus  1 capsule Oral Daily    gabapentin  200 mg Oral TID    alteplase  2 mg IntraCATHeter Once    ipratropium-albuterol  1 ampule Inhalation 4x daily    doxycycline hyclate  100 mg Oral 2 times per day    bumetanide  2 mg Oral Once per day on Tue Thu Sat    isosorbide mononitrate  30 mg Oral Daily    latanoprost  1 drop Right Eye Nightly    metoprolol succinate  25 mg Oral Daily    pantoprazole  20 mg Oral QAM AC    [Held by provider] ticagrelor  90 mg Oral BID    sodium chloride flush  5-40 mL IntraVENous 2 times per day    [Held by provider] lanthanum  1,000 mg Oral TID WC    heparin (porcine)  5,000 Units SubCUTAneous TID    brimonidine  1 drop Right Eye BID    timolol  1 drop Right Eye BID    aspirin  81 mg Oral Daily      sodium chloride 25 mL (06/19/22 7918)    dextrose       HYDROcodone-acetaminophen **OR** HYDROcodone 5 mg - acetaminophen, guaiFENesin, midodrine, sodium chloride flush, sodium chloride, ondansetron **OR** ondansetron, polyethylene glycol, acetaminophen **OR** acetaminophen, glucose, dextrose bolus **OR** dextrose bolus, glucagon (rDNA), dextrose    IMPRESSION/RECOMMENDATIONS:      End-stage renal disease continue dialysis Monday Wednesday Friday  Anemia of chronic kidney disease JAYSHREE on hold  Secondary hyperparathyroidism of renal origin we will resume hospital as phosphorus now  Ngo Street, MD  6/27/2022 12:52 PM

## 2022-06-27 NOTE — CARE COORDINATION
Social Work / Discharge Planning : SW received script from ROBINA ARRIOLA for IV antibiotics to be given with her HD for 28 days. AINSLEY called and faxed script to Mart[facility]nez from [facility]. AINSLEY to follow.  Electronically signed by Sherolyn Canavan, LSW on 6/27/22 at 11:25 AM EDT

## 2022-06-27 NOTE — ANESTHESIA POSTPROCEDURE EVALUATION
Department of Anesthesiology  Postprocedure Note    Patient: Milton Rodriguez  MRN: [id number]  YOB: 1955  Date of evaluation: 6/27/2022      Procedure Summary     Date: 06/27/22 Room / Location: [facility] OR 10 / [facility]    Anesthesia Start: 1765 Anesthesia Stop: 8944    Procedure: PORT REMOVAL (N/A Chest) Diagnosis:       Pain      (Pain [R52])    Surgeons: Haley Lainez MD Responsible Provider: Shannon Hernandez DO    Anesthesia Type: MAC ASA Status: 4          Anesthesia Type: MAC    Nikki Phase I:      Nikki Phase II: Nikki Score: 10      Anesthesia Post Evaluation    Patient location during evaluation: PACU  Patient participation: complete - patient participated  Level of consciousness: awake and alert  Airway patency: patent  Nausea & Vomiting: no nausea and no vomiting  Complications: no  Cardiovascular status: hemodynamically stable  Respiratory status: acceptable  Hydration status: euvolemic

## 2022-06-28 VITALS
HEART RATE: 70 BPM | BODY MASS INDEX: 21.62 KG/M2 | TEMPERATURE: 97.7 F | DIASTOLIC BLOOD PRESSURE: 55 MMHG | WEIGHT: 151 LBS | OXYGEN SATURATION: 98 % | SYSTOLIC BLOOD PRESSURE: 117 MMHG | HEIGHT: 70 IN | RESPIRATION RATE: 16 BRPM

## 2022-06-28 PROBLEM — J18.9 HEALTHCARE-ASSOCIATED PNEUMONIA: Status: RESOLVED | Noted: 2022-06-16 | Resolved: 2022-06-28

## 2022-06-28 PROBLEM — R09.02 HYPOXIA: Status: RESOLVED | Noted: 2022-06-16 | Resolved: 2022-06-28

## 2022-06-28 PROBLEM — R41.82 AMS (ALTERED MENTAL STATUS): Status: RESOLVED | Noted: 2022-06-15 | Resolved: 2022-06-28

## 2022-06-28 LAB
ANION GAP SERPL CALCULATED.3IONS-SCNC: 14 MMOL/L (ref 7–16)
BASOPHILS ABSOLUTE: 0.04 E9/L (ref 0–0.2)
BASOPHILS RELATIVE PERCENT: 0.7 % (ref 0–2)
BUN BLDV-MCNC: 42 MG/DL (ref 6–23)
CALCIUM SERPL-MCNC: 9.7 MG/DL (ref 8.6–10.2)
CHLORIDE BLD-SCNC: 97 MMOL/L (ref 98–107)
CO2: 26 MMOL/L (ref 22–29)
CREAT SERPL-MCNC: 4.9 MG/DL (ref 0.5–1)
EOSINOPHILS ABSOLUTE: 0.04 E9/L (ref 0.05–0.5)
EOSINOPHILS RELATIVE PERCENT: 0.7 % (ref 0–6)
GFR AFRICAN AMERICAN: 11
GFR NON-AFRICAN AMERICAN: 11 ML/MIN/1.73
GLUCOSE BLD-MCNC: 175 MG/DL (ref 74–99)
HCT VFR BLD CALC: 38 % (ref 34–48)
HEMOGLOBIN: 11.9 G/DL (ref 11.5–15.5)
IMMATURE GRANULOCYTES #: 0.04 E9/L
IMMATURE GRANULOCYTES %: 0.7 % (ref 0–5)
LYMPHOCYTES ABSOLUTE: 1.36 E9/L (ref 1.5–4)
LYMPHOCYTES RELATIVE PERCENT: 22.3 % (ref 20–42)
MCH RBC QN AUTO: 28.1 PG (ref 26–35)
MCHC RBC AUTO-ENTMCNC: 31.3 % (ref 32–34.5)
MCV RBC AUTO: 89.6 FL (ref 80–99.9)
METER GLUCOSE: 118 MG/DL (ref 74–99)
METER GLUCOSE: 134 MG/DL (ref 74–99)
METER GLUCOSE: 139 MG/DL (ref 74–99)
METER GLUCOSE: 149 MG/DL (ref 74–99)
MONOCYTES ABSOLUTE: 0.7 E9/L (ref 0.1–0.95)
MONOCYTES RELATIVE PERCENT: 11.5 % (ref 2–12)
NEUTROPHILS ABSOLUTE: 3.92 E9/L (ref 1.8–7.3)
NEUTROPHILS RELATIVE PERCENT: 64.1 % (ref 43–80)
PDW BLD-RTO: 18.1 FL (ref 11.5–15)
PLATELET # BLD: 201 E9/L (ref 130–450)
PMV BLD AUTO: 11.2 FL (ref 7–12)
POTASSIUM SERPL-SCNC: 4.7 MMOL/L (ref 3.5–5)
RBC # BLD: 4.24 E12/L (ref 3.5–5.5)
SODIUM BLD-SCNC: 137 MMOL/L (ref 132–146)
WBC # BLD: 6.1 E9/L (ref 4.5–11.5)

## 2022-06-28 PROCEDURE — 94640 AIRWAY INHALATION TREATMENT: CPT

## 2022-06-28 PROCEDURE — 80048 BASIC METABOLIC PNL TOTAL CA: CPT

## 2022-06-28 PROCEDURE — 6370000000 HC RX 637 (ALT 250 FOR IP): Performed by: FAMILY MEDICINE

## 2022-06-28 PROCEDURE — 36415 COLL VENOUS BLD VENIPUNCTURE: CPT

## 2022-06-28 PROCEDURE — 6360000002 HC RX W HCPCS: Performed by: SURGERY

## 2022-06-28 PROCEDURE — 99232 SBSQ HOSP IP/OBS MODERATE 35: CPT | Performed by: FAMILY MEDICINE

## 2022-06-28 PROCEDURE — 92526 ORAL FUNCTION THERAPY: CPT

## 2022-06-28 PROCEDURE — 82962 GLUCOSE BLOOD TEST: CPT

## 2022-06-28 PROCEDURE — 6370000000 HC RX 637 (ALT 250 FOR IP): Performed by: INTERNAL MEDICINE

## 2022-06-28 PROCEDURE — 85025 COMPLETE CBC W/AUTO DIFF WBC: CPT

## 2022-06-28 PROCEDURE — 6370000000 HC RX 637 (ALT 250 FOR IP): Performed by: SURGERY

## 2022-06-28 PROCEDURE — 94668 MNPJ CHEST WALL SBSQ: CPT

## 2022-06-28 PROCEDURE — 6360000002 HC RX W HCPCS: Performed by: FAMILY MEDICINE

## 2022-06-28 PROCEDURE — 2580000003 HC RX 258: Performed by: SURGERY

## 2022-06-28 PROCEDURE — 99238 HOSP IP/OBS DSCHRG MGMT 30/<: CPT | Performed by: FAMILY MEDICINE

## 2022-06-28 RX ORDER — HYDROCODONE BITARTRATE AND ACETAMINOPHEN 5; 325 MG/1; MG/1
1 TABLET ORAL EVERY 6 HOURS PRN
Qty: 12 TABLET | Refills: 0 | Status: SHIPPED | DISCHARGE
Start: 2022-06-28 | End: 2022-06-28 | Stop reason: HOSPADM

## 2022-06-28 RX ORDER — PROCHLORPERAZINE EDISYLATE 5 MG/ML
10 INJECTION INTRAMUSCULAR; INTRAVENOUS EVERY 6 HOURS PRN
Status: DISCONTINUED | OUTPATIENT
Start: 2022-06-28 | End: 2022-06-29 | Stop reason: HOSPADM

## 2022-06-28 RX ORDER — ACETAMINOPHEN 325 MG/1
650 TABLET ORAL EVERY 4 HOURS PRN
Qty: 60 TABLET | Refills: 0 | DISCHARGE
Start: 2022-06-28 | End: 2022-07-11

## 2022-06-28 RX ORDER — MECLIZINE HCL 12.5 MG/1
12.5 TABLET ORAL 3 TIMES DAILY PRN
Status: DISCONTINUED | OUTPATIENT
Start: 2022-06-28 | End: 2022-06-29 | Stop reason: HOSPADM

## 2022-06-28 RX ORDER — PROCHLORPERAZINE MALEATE 10 MG
5 TABLET ORAL EVERY 6 HOURS PRN
Qty: 20 TABLET | Refills: 0 | DISCHARGE
Start: 2022-06-28 | End: 2022-07-11

## 2022-06-28 RX ORDER — GABAPENTIN 100 MG/1
100 CAPSULE ORAL 2 TIMES DAILY
Status: DISCONTINUED | OUTPATIENT
Start: 2022-06-28 | End: 2022-06-29 | Stop reason: HOSPADM

## 2022-06-28 RX ORDER — MECLIZINE HCL 12.5 MG/1
12.5 TABLET ORAL 3 TIMES DAILY PRN
Qty: 15 TABLET | Refills: 0 | DISCHARGE
Start: 2022-06-28 | End: 2022-07-03

## 2022-06-28 RX ADMIN — ISOSORBIDE MONONITRATE 30 MG: 30 TABLET, EXTENDED RELEASE ORAL at 09:24

## 2022-06-28 RX ADMIN — TIMOLOL MALEATE 1 DROP: 5 SOLUTION OPHTHALMIC at 20:11

## 2022-06-28 RX ADMIN — LANTHANUM CARBONATE 1000 MG: 500 TABLET, CHEWABLE ORAL at 11:57

## 2022-06-28 RX ADMIN — LATANOPROST 1 DROP: 50 SOLUTION OPHTHALMIC at 20:11

## 2022-06-28 RX ADMIN — TICAGRELOR 90 MG: 90 TABLET ORAL at 10:12

## 2022-06-28 RX ADMIN — IPRATROPIUM BROMIDE AND ALBUTEROL SULFATE 1 AMPULE: .5; 2.5 SOLUTION RESPIRATORY (INHALATION) at 08:42

## 2022-06-28 RX ADMIN — HEPARIN SODIUM 5000 UNITS: 10000 INJECTION INTRAVENOUS; SUBCUTANEOUS at 09:24

## 2022-06-28 RX ADMIN — METOPROLOL SUCCINATE 25 MG: 25 TABLET, FILM COATED, EXTENDED RELEASE ORAL at 09:25

## 2022-06-28 RX ADMIN — DOXYCYCLINE HYCLATE 100 MG: 100 CAPSULE ORAL at 20:09

## 2022-06-28 RX ADMIN — HYDROCODONE BITARTRATE AND ACETAMINOPHEN 1 TABLET: 5; 325 TABLET ORAL at 05:44

## 2022-06-28 RX ADMIN — TIMOLOL MALEATE 1 DROP: 5 SOLUTION OPHTHALMIC at 09:29

## 2022-06-28 RX ADMIN — GABAPENTIN 200 MG: 100 CAPSULE ORAL at 09:24

## 2022-06-28 RX ADMIN — PROCHLORPERAZINE EDISYLATE 10 MG: 5 INJECTION INTRAMUSCULAR; INTRAVENOUS at 10:24

## 2022-06-28 RX ADMIN — MECLIZINE 12.5 MG: 12.5 TABLET ORAL at 10:11

## 2022-06-28 RX ADMIN — Medication 1 CAPSULE: at 10:11

## 2022-06-28 RX ADMIN — ONDANSETRON 4 MG: 4 TABLET, ORALLY DISINTEGRATING ORAL at 00:34

## 2022-06-28 RX ADMIN — PANTOPRAZOLE SODIUM 20 MG: 20 TABLET, DELAYED RELEASE ORAL at 05:44

## 2022-06-28 RX ADMIN — GABAPENTIN 100 MG: 100 CAPSULE ORAL at 20:09

## 2022-06-28 RX ADMIN — SODIUM CHLORIDE, PRESERVATIVE FREE 10 ML: 5 INJECTION INTRAVENOUS at 20:13

## 2022-06-28 RX ADMIN — HEPARIN SODIUM 5000 UNITS: 10000 INJECTION INTRAVENOUS; SUBCUTANEOUS at 14:31

## 2022-06-28 RX ADMIN — IPRATROPIUM BROMIDE AND ALBUTEROL SULFATE 1 AMPULE: .5; 2.5 SOLUTION RESPIRATORY (INHALATION) at 15:54

## 2022-06-28 RX ADMIN — SODIUM CHLORIDE, PRESERVATIVE FREE 10 ML: 5 INJECTION INTRAVENOUS at 09:28

## 2022-06-28 RX ADMIN — DOXYCYCLINE HYCLATE 100 MG: 100 CAPSULE ORAL at 09:24

## 2022-06-28 RX ADMIN — BUMETANIDE 2 MG: 1 TABLET ORAL at 09:24

## 2022-06-28 RX ADMIN — LANTHANUM CARBONATE 1000 MG: 500 TABLET, CHEWABLE ORAL at 17:38

## 2022-06-28 RX ADMIN — TICAGRELOR 90 MG: 90 TABLET ORAL at 20:09

## 2022-06-28 RX ADMIN — BRIMONIDINE TARTRATE 1 DROP: 2 SOLUTION/ DROPS OPHTHALMIC at 20:11

## 2022-06-28 RX ADMIN — BRIMONIDINE TARTRATE 1 DROP: 2 SOLUTION/ DROPS OPHTHALMIC at 09:29

## 2022-06-28 RX ADMIN — ASPIRIN 81 MG CHEWABLE TABLET 81 MG: 81 TABLET CHEWABLE at 09:24

## 2022-06-28 ASSESSMENT — PAIN DESCRIPTION - LOCATION
LOCATION: ABDOMEN
LOCATION: ABDOMEN

## 2022-06-28 ASSESSMENT — PAIN SCALES - GENERAL
PAINLEVEL_OUTOF10: 7
PAINLEVEL_OUTOF10: 6
PAINLEVEL_OUTOF10: 6
PAINLEVEL_OUTOF10: 0

## 2022-06-28 ASSESSMENT — PAIN DESCRIPTION - FREQUENCY: FREQUENCY: INTERMITTENT

## 2022-06-28 ASSESSMENT — PAIN DESCRIPTION - ORIENTATION
ORIENTATION: MID
ORIENTATION: MID

## 2022-06-28 ASSESSMENT — PAIN DESCRIPTION - PAIN TYPE: TYPE: ACUTE PAIN

## 2022-06-28 ASSESSMENT — PAIN DESCRIPTION - DESCRIPTORS
DESCRIPTORS: ACHING
DESCRIPTORS: ACHING

## 2022-06-28 ASSESSMENT — PAIN - FUNCTIONAL ASSESSMENT: PAIN_FUNCTIONAL_ASSESSMENT: ACTIVITIES ARE NOT PREVENTED

## 2022-06-28 ASSESSMENT — PAIN DESCRIPTION - ONSET: ONSET: GRADUAL

## 2022-06-28 ASSESSMENT — PAIN SCALES - WONG BAKER: WONGBAKER_NUMERICALRESPONSE: 0

## 2022-06-28 NOTE — PROGRESS NOTES
GENERAL SURGERY  DAILY PROGRESS NOTE    Date:2022       [id number]  Patient Name:Claudia Kinney     YOB: 1955     Age:67 y.o. Chief Complaint:  Chief Complaint   Patient presents with    Shortness of Breath     for a few days per pt    Altered Mental Status     per NH staff, pt able to answer all orientation questions        Subjective:  Nausea since surgery yesterday, very hungry today and believes her nausea is due to hunger. Surgical pain controlled. No fevers overnight. Did not need to change dressing yet since surgery. Objective:  BP (!) 151/81   Pulse 72   Temp 98.2 °F (36.8 °C) (Axillary)   Resp 18   Ht 5' 10\" (1.778 m)   Wt 151 lb (68.5 kg)   SpO2 97%   BMI 21.67 kg/m²   Temp (24hrs), Av.7 °F (36.5 °C), Min:97.4 °F (36.3 °C), Max:98.2 °F (36.8 °C)      I/O (24Hr):  I/O last 3 completed shifts:  In: -   Out: 3200      GENERAL:  No acute distress. Alert and interactive. LUNGS:  No cough. Nonlabored breathing on room air. CARDIOVASC:  Normal rate, warm. CHEST: right upper chest wound from port removal site is clean with no purulent drainage, has serous seeping, some mild serosanguineous staining on gauze  EXTREMITIES:  No UE edema, no deformities.     Assessment:  78 y.o. female with exposed port removed     Plan:  -Cover wound with gauze and change at least daily   -Follow-up in 2 weeks for wound check  -Call as needed     Electronically signed by Elease Bosworth, MD on 2022 at 7:54 AM

## 2022-06-28 NOTE — PROGRESS NOTES
Department of Internal Medicine  Nephrology Attending Progress Note        SUBJECTIVE:  Mrs Brendan Shine had bad morning with nausea, emisis and dizziness, only took some broth for lunch. Her bp was low this am , no low blood sugars documented     PMH   End-stage renal disease on dialysis Monday Wednesday Friday  Hypertension  Hyperlipidemia  Type 2 diabetes with neuropathy, retinopathy  Anemia of chronic kidney disease (patient Jehovah witness)  Secondary hyperparathyroid disease of renal origin  History of breast cancer rt side  History of coronary disease status post heart cath with balloon angioplasty 5/5  Carpal tunnel syndrome with bilateral release  History of osteomyelitis/discitis  L1-L2 on IV Vanco for 6 weeks now on suppressive doxycycline  History of spinal fusion of L3/L4/L5  History of glaucoma    Physical Exam:    Vitals:    06/28/22 0825   BP: (!) 124/44   Pulse: 73   Resp: 16   Temp: 97.7 °F (36.5 °C)   SpO2: 96%       I/O last 24 hours:  Intake/Output none documented:/3200    Weight: 134 postdialysis    General: Awake, alert, no acute distress  Neck: No JVD noted  Lungs: Diminished at the bases bilaterally. CV: Regular rate and rhythm. No rub  Abd: Soft, nontender, nondistended. Active bowel sounds  Skin: Warm and dry. No rash on exposed extremities  Ext: 1+ RUE edema (h/o breast CA);  No BLE edema ; LUE AV fistula   Neuro: Awake, answers questions appropriately    DATA:    CBC with Differential:    Lab Results   Component Value Date    WBC 6.1 06/28/2022    RBC 4.24 06/28/2022    HGB 11.9 06/28/2022    HCT 38.0 06/28/2022     06/28/2022    MCV 89.6 06/28/2022    MCH 28.1 06/28/2022    MCHC 31.3 06/28/2022    RDW 18.1 06/28/2022    NRBC 0.0 02/18/2017    SEGSPCT 70 03/12/2014    BANDSPCT 1 02/18/2015    LYMPHOPCT 22.3 06/28/2022    PROMYELOPCT 1.8 02/18/2017    MONOPCT 11.5 06/28/2022    MYELOPCT 0.9 10/24/2017    BASOPCT 0.7 06/28/2022    MONOSABS 0.70 06/28/2022    LYMPHSABS 1.36 06/28/2022 EOSABS 0.04 06/28/2022    BASOSABS 0.04 06/28/2022     BMP:    Lab Results   Component Value Date     06/28/2022    K 4.7 06/28/2022    K 4.7 06/27/2022    CL 97 06/28/2022    CO2 26 06/28/2022    BUN 42 06/28/2022    LABALBU 3.5 06/15/2022    LABALBU 3.8 04/02/2012    CREATININE 4.9 06/28/2022    CALCIUM 9.7 06/28/2022    GFRAA 11 06/28/2022    LABGLOM 11 06/28/2022    GLUCOSE 175 06/28/2022    GLUCOSE 46 04/02/2012     Magnesium:    Lab Results   Component Value Date    MG 2.1 06/27/2022     Phosphorus:    Lab Results   Component Value Date    PHOS 6.0 06/27/2022          [START ON 6/29/2022] vancomycin  1,000 mg IntraVENous Q MWF    cefepime  2,000 mg IntraVENous See Admin Instructions    vancomycin  1,000 mg IntraVENous See Admin Instructions    lidocaine  1 patch TransDERmal Daily    lactobacillus  1 capsule Oral Daily    gabapentin  200 mg Oral TID    alteplase  2 mg IntraCATHeter Once    ipratropium-albuterol  1 ampule Inhalation 4x daily    doxycycline hyclate  100 mg Oral 2 times per day    bumetanide  2 mg Oral Once per day on Tue Thu Sat    isosorbide mononitrate  30 mg Oral Daily    latanoprost  1 drop Right Eye Nightly    metoprolol succinate  25 mg Oral Daily    pantoprazole  20 mg Oral QAM AC    ticagrelor  90 mg Oral BID    sodium chloride flush  5-40 mL IntraVENous 2 times per day    lanthanum  1,000 mg Oral TID WC    heparin (porcine)  5,000 Units SubCUTAneous TID    brimonidine  1 drop Right Eye BID    timolol  1 drop Right Eye BID    aspirin  81 mg Oral Daily      sodium chloride 1 mL/hr at 06/27/22 1750    dextrose       meclizine, prochlorperazine, HYDROcodone-acetaminophen **OR** HYDROcodone 5 mg - acetaminophen, guaiFENesin, midodrine, sodium chloride flush, sodium chloride, ondansetron **OR** ondansetron, polyethylene glycol, acetaminophen **OR** acetaminophen, glucose, dextrose bolus **OR** dextrose bolus, glucagon (rDNA), dextrose    IMPRESSION/RECOMMENDATIONS: End-stage renal disease continue dialysis Monday Wednesday Friday  Anemia of chronic kidney disease JAYSHREE on hold as Hg above target  Secondary hyperparathyroidism of renal origin we will resume fosrenol as phosphorus now 6 for recheck in am  Nausea emisis etiology?  Will stop bumex and decrease neurontin, check orthostatic bp        Xiomara Davis MD  6/28/2022 1:04 PM

## 2022-06-28 NOTE — PROGRESS NOTES
[facility]  Progress Note    Chief complaint :  Chief Complaint   Patient presents with    Shortness of Breath     for a few days per pt    Altered Mental Status     per NH staff, pt able to answer all orientation questions       Subjective:    Patient states that surgery ended at 7 PM she has not been able to have any thing for dinner. Patient states that she continues to feel dizzy when getting up and is nauseous. Patient denies having any shortness of breath and any coughs. Patient denies having any fever, chills, abdominal, pain chest,  vomiting    Per nurse no overnight events. Past medical, surgical, family and social history were reviewed, non-contributory, and unchanged unless otherwise stated. Objective:  BP (!) 151/81   Pulse 72   Temp 98.2 °F (36.8 °C) (Axillary)   Resp 18   Ht 5' 10\" (1.778 m)   Wt 151 lb (68.5 kg)   SpO2 97%   BMI 21.67 kg/m²   Vital signs reviewed     Physical Exam  Vitals and nursing note reviewed. Constitutional:       Appearance: Normal appearance. Comments: Resting comfortably   HENT:      Head: Normocephalic and atraumatic. Nose: Nose normal. No congestion or rhinorrhea. Eyes:      General: No scleral icterus. Conjunctiva/sclera: Conjunctivae normal.   Neck:      Vascular: No carotid bruit. Cardiovascular:      Rate and Rhythm: Normal rate and regular rhythm. Heart sounds: Murmur (2/6 systolic ) heard. No gallop. Pulmonary:      Effort: Pulmonary effort is normal.      Breath sounds: Normal breath sounds. No wheezing or rales. Comments:     Chest:   Breasts:      Right: Absent. Comments: Surgical scar closed and intact  Abdominal:      General: Bowel sounds are normal. There is no distension. Palpations: Abdomen is soft. There is no mass. Tenderness: There is no abdominal tenderness. There is no guarding or rebound.    Musculoskeletal:         General: Tenderness (Rt lower back where the pain pump located) present. Normal range of motion. Cervical back: Normal range of motion and neck supple. Right lower leg: No edema. Left lower leg: No edema. Comments:  Middle distal phlanx amputed. Right lymphedema    Skin:     Coloration: Skin is not jaundiced. Comments: Port on right chest removed, intact and dressed with gauze    Multiple surgical scars healed scabs located throughout patient's back. Neurological:      General: No focal deficit present. Mental Status: She is alert and oriented to person, place, and time. Psychiatric:         Mood and Affect: Mood normal.       Labs:  Labs Reviewed    Recent Results (from the past 24 hour(s))   POCT Glucose    Collection Time: 06/27/22 12:15 PM   Result Value Ref Range    Meter Glucose 123 (H) 74 - 99 mg/dL   POCT Glucose    Collection Time: 06/27/22  6:55 PM   Result Value Ref Range    Meter Glucose 127 (H) 74 - 99 mg/dL   POCT Glucose    Collection Time: 06/27/22 10:06 PM   Result Value Ref Range    Meter Glucose 185 (H) 74 - 99 mg/dL   POCT Glucose    Collection Time: 06/28/22  6:35 AM   Result Value Ref Range    Meter Glucose 118 (H) 74 - 99 mg/dL       Radiology and other tests reviewed:  CT ABDOMEN PELVIS W IV CONTRAST Additional Contrast? None   Final Result   1. Pattern of generalized anasarca. 2.  No conspicuous localized inflammatory process in the mid mesentery fat   planes. No signs for acute diverticulitis. No bowel obstruction. No free   intraperitoneal air. The no ascites. 3.  Findings for inflammatory process/discitis in the levels of L1-L2 L3   which have been previously described. 4.  Previous spinal fusion of L3/L4/L5 which has been previously described. 5.  Edema with generalized anasarca correlate with volume overload and   chronic renal failure. The extensive vascular arterial calcifications   correlate also with chronic renal failure.       RECOMMENDATIONS: Unavailable         XR ABDOMEN (KUB) (SINGLE AP VIEW)   Final Result   Colonic diverticulosis. Fluoroscopy modified barium swallow with video   Final Result   1. No barium aspiration or significant swallowing deficits. 2.  Transient laryngeal penetration with thin liquid barium. 3.  Cervical spine degenerative spondylosis. 4.  Please see separate speech pathology report for full discussion of   findings and recommendations. CT HEAD WO CONTRAST   Final Result   No acute intracranial abnormality. CT CHEST WO CONTRAST   Final Result   1. New ground-glass and airspace consolidations throughout right lung notable   in right lower lobe as well as minimal opacities in left lower lobe   suggesting interstitial pulmonary edema or bilateral pneumonia. 2. Cardiomegaly with pulmonary vascular congestion. 3. Multiple stable prominent mediastinal lymph nodes. XR CHEST PORTABLE   Final Result   No acute process.              Assessment:  Active Hospital Problems    Diagnosis Date Noted    Patient is Hoahaoism [Z78.9] 11/07/2017     Priority: High     Class: Chronic    Acute respiratory failure with hypoxia (HCC) [J96.01]      Priority: Medium    Lower abdominal pain [R10.30]      Priority: Medium    Healthcare-associated pneumonia [J18.9] 06/16/2022     Priority: Medium    Hypoxia [R09.02] 06/16/2022     Priority: Medium    Ischemic cardiomyopathy [I25.5]      Priority: Medium    AMS (altered mental status) [R41.82] 06/15/2022     Priority: Medium    Elevated troponin [R77.8] 02/17/2017     Priority: Medium    Pain [R52] 06/15/2022    CAD in native artery [I25.10] 12/09/2021    Mitral valve disease [I05.9] 11/11/2021    Lymphedema of right upper extremity [I89.0]     ESRD (end stage renal disease) on dialysis ([address] [N18.6, Z99.2]     Diabetic peripheral neuropathy ([facility] [E11.42] 08/30/2018    Controlled type 2 diabetes mellitus with chronic kidney disease on chronic dialysis, with long-term current use of insulin ([facility] [address] [E11.22, N18.6, Z79.4, Z99.2] 02/22/2017    Glaucoma [H40.9]        Plan:  Mediport with skin erosion removed   POD #1  Patient received vancomycin and cefepime prior to procedure, after hemodialysis   Encourage patient to drink plenty of fluids  ID following appreciate input   Surgery following appreciate input     Bilateral lower lobe Health-care associated pneumonia- Improved  Completed Zosyn and Augmentin ES   Duonebs Q4H PRN  Guaifenesin 400mg QID PRN  Chest therapy, incentive spirometer,   CBC, BMP daily  ID following appreciate input    End-stage renal disease hemodialysis MWF  Continue home Fosrenol 1 g TID- held  Continue Bumex 2mg  BMP, mag, Phos daily  Nephrology following appreciate input     CAD with NSTEMI 1 month ago  Continue home metoprolol 25 mg daily, Imdur 30 mg daily,   Ticagrelor 90mg BID was held for procedure. Chronic back pain  Patient has multiple fusions and a history of back pain. Lidocaine patch   Neurosurgery contacted for further recommendations on pain pump    Vertebral osteomyelitis  Stable  Completed 6 weeks of IV vancomycin 750 mg IV antibiotic after dialysis MWF  PO Doxycyline 100 mg Q12H for suppression of osteomyelitis. -Will continue when discharged   ID following appreciate input    CHF  Last echo with EF of 40 to 45%, dilated LA.   Monitor for fluid overload  Daily weights  Strict ins and outs  Continue home metoprolol succinate 25 mg daily, Imdur 30 mg daily    Anemia of chronic disease   No blood to be given to patient  Daily CBC ,continue to monitor     Insulin-dependent diabetes type 2  Last A1c 5.1  POCT glucose checks  Hyperglycemia protocol    History of hypertension  Currently soft blood pressures  Midodrine 5 mg daily as needed if blood pressure less than 100/60  Monitor blood pressures    Glaucoma right eye   bromonidine 0.2% ophthalmic solution 1 drop BID   Latanoprost 0.005% ophthalmic solution 1 drop  Nightly   Timolol 0.5% ophthalmic solution 1 drop BID     Pain control-Norco 5 to 7.5 every 6 hours as needed for moderate and severe pain  DVT ppx: Heparin 5000 units TID  GI ppx: none  Code Status: Full  Diet: Carb controlled -no straw use due to pharyngeal phase dysphagia    Disposition: Discharge after procedure if okay with specialist.  Giuseppe Salmeron MD  Family Medicine [id number]

## 2022-06-28 NOTE — PROGRESS NOTES
Occupational Therapy  Patient treatment attempted this AM.  Patient declined participation due to not feeling well, nursing aware of patient not feeling well. Will continue OT POC as able and appropriate.     Radha DOLAN/TEMI [id number]

## 2022-06-28 NOTE — PROGRESS NOTES
Comprehensive Nutrition Assessment    Type and Reason for Visit:  Reassess    Nutrition Recommendations/Plan:   1. Continue Diet and ADAT once med appropriate. Will Re-Start ONS and monitor. 2. Highly recommend to consider EN support d/t ongoing minimal intake. If Needed;  TF Recommendations:  Renal (Nepro) @ 05JT/MR (Goal) Cyclic x 63RQ/S (hold 1hr pre, 2hr post Vibramycin BID) + 1 Protein Modular Daily= 900ml TV, 1620kcal, 73gm Pro, (1724kcal, 99gm Pro w/ Pro Mod), 653ml free water. Flush per renal mgmt. Malnutrition Assessment:  Malnutrition Status: At risk for malnutrition (Comment) (06/22/22 1211)    Context:  Acute Illness     Findings of the 6 clinical characteristics of malnutrition:  Energy Intake:  50% or less of estimated energy requirements for 5 or more days  Weight Loss:  Unable to assess (2/2 fluid shifts)     Body Fat Loss:  Unable to assess (2/2 ESTHER for HD at time of assessment)     Muscle Mass Loss:  Unable to assess    Fluid Accumulation:  No significant fluid accumulation     Strength:  Not Performed    Nutrition Assessment:    Pt adm from SNF w/ AMS/SOB x ~3d pta. 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 Adm w/ +HCAP/Asp PNA and noted Dysphagia s/p MBS 6/16. S/p port removal 6/27 d/t exposed/skin erosion. Pt remains at nutritional risk d/t ongoing minimal since adm w/ poor appetite/dysphagia, abd pain/diarrhea and noted N/V this AM as well as w/ increased needs 2/2 ESRD/HD losses. Will Re-Start ONS, provide EN recs if needed and monitor.     Nutrition Related Findings:    A&O, dentition WNL, tender/non-distended Abd, +BS, +N/V, no edema, -I/O's Wound Type: Surgical Incision       Current Nutrition Intake & Therapies:    Average Meal Intake: 1-25% (minimal intake data x past ~5d per doc flow)  Average Outcomes: Biochemical Data,Chewing or Swallowing,Diarrhea,GI Status,Nausea or Vomiting,Fluid Status or Edema,Hemodynamic Status,Nutrition Focused Physical Findings,Skin,Weight    Discharge Planning:     Too soon to determine     Jayden Felix RD, LD  Contact: ext [phone]

## 2022-06-28 NOTE — DISCHARGE INSTR - DIET

## 2022-06-28 NOTE — PROGRESS NOTES
Physical Therapy  Facility/Department: [address] INTERMEDIATE  NAME: Yamilka Escobedo  : 1955  MRN: [id number]    Chart reviewed and PT treatment attempted this am.  Per nursing , hold treatment at this time. Will check back at later time/date.      Sabiha Leonard, [phone]

## 2022-06-28 NOTE — PROGRESS NOTES
[address] [place] [address] [facility]  [facility]  Progress Note    SUBJECTIVE:  Chief Complaint   Patient presents with    Shortness of Breath     for a few days per pt    Altered Mental Status     per NH staff, pt able to answer all orientation questions     Patient is tolerating medications. No reported adverse drug reactions. Had some nausea after surgery   No fevers    Review of systems:  As stated above in the chief complaint, otherwise negative.     Medications:  Scheduled Meds:   [START ON 6/29/2022] vancomycin  1,000 mg IntraVENous Q MWF    cefepime  2,000 mg IntraVENous See Admin Instructions    vancomycin  1,000 mg IntraVENous See Admin Instructions    lidocaine  1 patch TransDERmal Daily    lactobacillus  1 capsule Oral Daily    gabapentin  200 mg Oral TID    alteplase  2 mg IntraCATHeter Once    ipratropium-albuterol  1 ampule Inhalation 4x daily    doxycycline hyclate  100 mg Oral 2 times per day    bumetanide  2 mg Oral Once per day on Tue Thu Sat    isosorbide mononitrate  30 mg Oral Daily    latanoprost  1 drop Right Eye Nightly    metoprolol succinate  25 mg Oral Daily    pantoprazole  20 mg Oral QAM AC    ticagrelor  90 mg Oral BID    sodium chloride flush  5-40 mL IntraVENous 2 times per day    lanthanum  1,000 mg Oral TID WC    heparin (porcine)  5,000 Units SubCUTAneous TID    brimonidine  1 drop Right Eye BID    timolol  1 drop Right Eye BID    aspirin  81 mg Oral Daily     Continuous Infusions:   sodium chloride 1 mL/hr at 06/27/22 1750    dextrose       PRN Meds:meclizine, prochlorperazine, HYDROcodone-acetaminophen **OR** HYDROcodone 5 mg - acetaminophen, guaiFENesin, midodrine, sodium chloride flush, sodium chloride, ondansetron **OR** ondansetron, polyethylene glycol, acetaminophen **OR** acetaminophen, glucose, dextrose bolus **OR** dextrose bolus, glucagon (rDNA), dextrose    OBJECTIVE:  BP (!) 124/44   Pulse 73   Temp 97.7 °F (36.5 °C) (Oral)   Resp 16   Ht 5' 10\" (1.778 m) Component Value Date    CRP 7.7 (H) 04/13/2022    CRP 0.7 (H) 01/20/2022    CRP 1.6 (H) 12/20/2021     Lab Results   Component Value Date    SEDRATE 30 (H) 06/15/2022    SEDRATE 81 (H) 04/13/2022    SEDRATE 19 01/20/2022     Radiology:  Reviewed    Microbiology:   Respiratory Culture 6/16/22: few PMNL, moderate GPC in pairs  Blood Cultures 6/15/22: negative   COVID-19 PCR positive on respiratory array  Stools: pending    ASSESSMENT:  Aspiration pneumonia pneumonia/HCAP- improved  Vertebral osteomyelitis and infected hardware lumbar spine-suppressive doxycycline ongoing; just finished 6 weeks of Vanco with hemodialysis- now on suppression c doxy  Respiratory viral panel positive for COVID by PCR; rapid test was negative but clinically CAT scan reflects bacterial pneumonia at this point I believe the COVID is asymptomatic. Mild leukopenia  Status post mediport removal 6/27/2022    PLAN:  Doxycycline suppression for Vertebral Osteo  1g Vanco MWF with HD treatment for 4 weeks - script in chart  Speech following: video swallow completed-laryngeal penetration with thin liquids  Labs and cultures reviewed   DC plan [address] - okay to DC from ID POV - scripts on chart    GOYO Zendejas - CNP  10:03 AM  6/28/2022  Pt seen and examined. Above discussed agree with advanced practice nurse. Labs, cultures, and radiographs reviewed. Face to Face encounter occurred. Changes made as necessary.      Jeannie Martinez MD

## 2022-06-29 NOTE — PLAN OF CARE
Problem: Skin/Tissue Integrity  Goal: Absence of new skin breakdown  Description: 1. Monitor for areas of redness and/or skin breakdown  2. Assess vascular access sites hourly  3. Every 4-6 hours minimum:  Change oxygen saturation probe site  4. Every 4-6 hours:  If on nasal continuous positive airway pressure, respiratory therapy assess nares and determine need for appliance change or resting period.   6/28/2022 2224 by Flip Olivas RN  Outcome: Completed  6/28/2022 2224 by Flip Olivas RN  Outcome: Progressing     Problem: Safety - Adult  Goal: Free from fall injury  6/28/2022 2224 by Flip Olivas RN  Outcome: Completed  6/28/2022 2224 by Flip Olivas RN  Outcome: Progressing     Problem: ABCDS Injury Assessment  Goal: Absence of physical injury  6/28/2022 2224 by Flip Olivas RN  Outcome: Completed  6/28/2022 2224 by Flip Olivas RN  Outcome: Progressing  Flowsheets (Taken 6/28/2022 0825 by Musa Pond RN)  Absence of Physical Injury: Implement safety measures based on patient assessment     Problem: Chronic Conditions and Co-morbidities  Goal: Patient's chronic conditions and co-morbidity symptoms are monitored and maintained or improved  6/28/2022 2224 by Flip Olivas RN  Outcome: Completed  6/28/2022 2224 by Flip Olivas RN  Outcome: Progressing  Flowsheets  Taken 6/28/2022 1930 by Flip Olivas RN  Care Plan - Patient's Chronic Conditions and Co-Morbidity Symptoms are Monitored and Maintained or Improved: Monitor and assess patient's chronic conditions and comorbid symptoms for stability, deterioration, or improvement  Taken 6/28/2022 0825 by Musa Pond RN  Care Plan - Patient's Chronic Conditions and Co-Morbidity Symptoms are Monitored and Maintained or Improved: Monitor and assess patient's chronic conditions and comorbid symptoms for stability, deterioration, or improvement     Problem: Pain  Goal: Verbalizes/displays adequate comfort level or baseline comfort level  6/28/2022 2224 by Chino Butts RN  Outcome: Completed  6/28/2022 2224 by Chnio Butts RN  Outcome: Progressing     Problem: Nutrition Deficit:  Goal: Optimize nutritional status  6/28/2022 2224 by Chino Butts RN  Outcome: Completed  6/28/2022 2224 by Chino Butts RN  Outcome: Progressing  Flowsheets (Taken 6/28/2022 1154 by Kyle Muniz, RAFAEL, LD)  Nutrient intake appropriate for improving, restoring, or maintaining nutritional needs:   Assess nutritional status and recommend course of action   Monitor oral intake, labs, and treatment plans   Recommend appropriate diets, oral nutritional supplements, and vitamin/mineral supplements   Recommend, monitor, and adjust tube feedings and TPN/PPN based on assessed needs     Problem: Discharge Planning  Goal: Discharge to home or other facility with appropriate resources  6/28/2022 2224 by Chino Butts RN  Outcome: Completed  6/28/2022 2224 by Chino Butts RN  Outcome: Progressing  Flowsheets  Taken 6/28/2022 1930 by Chino Butts RN  Discharge to home or other facility with appropriate resources: Identify barriers to discharge with patient and caregiver  Taken 6/28/2022 0825 by Jaiden Capps RN  Discharge to home or other facility with appropriate resources: Identify barriers to discharge with patient and caregiver

## 2022-06-30 LAB — CULTURE CATHETER TIP: NORMAL

## 2022-07-11 ENCOUNTER — HOSPITAL ENCOUNTER (INPATIENT)
Age: 67
LOS: 11 days | Discharge: HOME | DRG: 314 | End: 2022-07-22
Attending: EMERGENCY MEDICINE | Admitting: FAMILY MEDICINE
Payer: COMMERCIAL

## 2022-07-11 ENCOUNTER — APPOINTMENT (OUTPATIENT)
Dept: GENERAL RADIOLOGY | Age: 67
DRG: 314 | End: 2022-07-11
Payer: COMMERCIAL

## 2022-07-11 DIAGNOSIS — I95.9 HYPOTENSION, UNSPECIFIED HYPOTENSION TYPE: Primary | ICD-10-CM

## 2022-07-11 PROBLEM — M46.20 VERTEBRAL OSTEOMYELITIS, CHRONIC (HCC): Status: ACTIVE | Noted: 2022-07-11

## 2022-07-11 LAB
ALBUMIN SERPL-MCNC: 3.2 G/DL (ref 3.5–5.2)
ALP BLD-CCNC: 216 U/L (ref 35–104)
ALT SERPL-CCNC: 76 U/L (ref 0–32)
ANION GAP SERPL CALCULATED.3IONS-SCNC: 14 MMOL/L (ref 7–16)
ANION GAP SERPL CALCULATED.3IONS-SCNC: 14 MMOL/L (ref 7–16)
AST SERPL-CCNC: 75 U/L (ref 0–31)
BASOPHILS ABSOLUTE: 0.03 E9/L (ref 0–0.2)
BASOPHILS RELATIVE PERCENT: 0.6 % (ref 0–2)
BILIRUB SERPL-MCNC: 0.3 MG/DL (ref 0–1.2)
BUN BLDV-MCNC: 38 MG/DL (ref 6–23)
BUN BLDV-MCNC: 45 MG/DL (ref 6–23)
C-REACTIVE PROTEIN: 1.4 MG/DL (ref 0–0.4)
CALCIUM SERPL-MCNC: 9.4 MG/DL (ref 8.6–10.2)
CALCIUM SERPL-MCNC: 9.8 MG/DL (ref 8.6–10.2)
CHLORIDE BLD-SCNC: 100 MMOL/L (ref 98–107)
CHLORIDE BLD-SCNC: 96 MMOL/L (ref 98–107)
CO2: 22 MMOL/L (ref 22–29)
CO2: 26 MMOL/L (ref 22–29)
CREAT SERPL-MCNC: 4.2 MG/DL (ref 0.5–1)
CREAT SERPL-MCNC: 4.8 MG/DL (ref 0.5–1)
EKG ATRIAL RATE: 44 BPM
EKG ATRIAL RATE: 46 BPM
EKG P AXIS: 40 DEGREES
EKG P AXIS: 49 DEGREES
EKG P-R INTERVAL: 220 MS
EKG P-R INTERVAL: 228 MS
EKG Q-T INTERVAL: 504 MS
EKG Q-T INTERVAL: 538 MS
EKG QRS DURATION: 102 MS
EKG QRS DURATION: 94 MS
EKG QTC CALCULATION (BAZETT): 441 MS
EKG QTC CALCULATION (BAZETT): 459 MS
EKG R AXIS: -7 DEGREES
EKG R AXIS: 1 DEGREES
EKG T AXIS: -171 DEGREES
EKG T AXIS: -177 DEGREES
EKG VENTRICULAR RATE: 44 BPM
EKG VENTRICULAR RATE: 46 BPM
EOSINOPHILS ABSOLUTE: 0.12 E9/L (ref 0.05–0.5)
EOSINOPHILS RELATIVE PERCENT: 2.4 % (ref 0–6)
GFR AFRICAN AMERICAN: 11
GFR AFRICAN AMERICAN: 13
GFR NON-AFRICAN AMERICAN: 11 ML/MIN/1.73
GFR NON-AFRICAN AMERICAN: 13 ML/MIN/1.73
GLUCOSE BLD-MCNC: 100 MG/DL (ref 74–99)
GLUCOSE BLD-MCNC: 171 MG/DL (ref 74–99)
HCT VFR BLD CALC: 38.8 % (ref 34–48)
HCT VFR BLD CALC: 42.2 % (ref 34–48)
HEMOGLOBIN: 12.1 G/DL (ref 11.5–15.5)
HEMOGLOBIN: 13.2 G/DL (ref 11.5–15.5)
IMMATURE GRANULOCYTES #: 0.02 E9/L
IMMATURE GRANULOCYTES %: 0.4 % (ref 0–5)
LACTIC ACID: 1 MMOL/L (ref 0.5–2.2)
LYMPHOCYTES ABSOLUTE: 1.28 E9/L (ref 1.5–4)
LYMPHOCYTES RELATIVE PERCENT: 25.5 % (ref 20–42)
MAGNESIUM: 1.9 MG/DL (ref 1.6–2.6)
MAGNESIUM: 2.1 MG/DL (ref 1.6–2.6)
MCH RBC QN AUTO: 28.4 PG (ref 26–35)
MCHC RBC AUTO-ENTMCNC: 31.2 % (ref 32–34.5)
MCV RBC AUTO: 91.1 FL (ref 80–99.9)
METER GLUCOSE: 70 MG/DL (ref 74–99)
METER GLUCOSE: 75 MG/DL (ref 74–99)
METER GLUCOSE: 78 MG/DL (ref 74–99)
MONOCYTES ABSOLUTE: 0.58 E9/L (ref 0.1–0.95)
MONOCYTES RELATIVE PERCENT: 11.6 % (ref 2–12)
NEUTROPHILS ABSOLUTE: 2.99 E9/L (ref 1.8–7.3)
NEUTROPHILS RELATIVE PERCENT: 59.5 % (ref 43–80)
PDW BLD-RTO: 17.3 FL (ref 11.5–15)
PHOSPHORUS: 3.2 MG/DL (ref 2.5–4.5)
PHOSPHORUS: 4.5 MG/DL (ref 2.5–4.5)
PLATELET # BLD: 147 E9/L (ref 130–450)
PMV BLD AUTO: 12.7 FL (ref 7–12)
POTASSIUM REFLEX MAGNESIUM: 4.7 MMOL/L (ref 3.5–5)
POTASSIUM SERPL-SCNC: 5 MMOL/L (ref 3.5–5)
PROCALCITONIN: 0.42 NG/ML (ref 0–0.08)
RBC # BLD: 4.26 E12/L (ref 3.5–5.5)
SODIUM BLD-SCNC: 136 MMOL/L (ref 132–146)
SODIUM BLD-SCNC: 136 MMOL/L (ref 132–146)
TOTAL CK: 25 U/L (ref 20–180)
TOTAL PROTEIN: 6.2 G/DL (ref 6.4–8.3)
TROPONIN, HIGH SENSITIVITY: 63 NG/L (ref 0–9)
TROPONIN, HIGH SENSITIVITY: 69 NG/L (ref 0–9)
TSH SERPL DL<=0.05 MIU/L-ACNC: 4.17 UIU/ML (ref 0.27–4.2)
WBC # BLD: 5 E9/L (ref 4.5–11.5)

## 2022-07-11 PROCEDURE — 80048 BASIC METABOLIC PNL TOTAL CA: CPT

## 2022-07-11 PROCEDURE — 2000000000 HC ICU R&B

## 2022-07-11 PROCEDURE — 6370000000 HC RX 637 (ALT 250 FOR IP)

## 2022-07-11 PROCEDURE — 93005 ELECTROCARDIOGRAM TRACING: CPT | Performed by: STUDENT IN AN ORGANIZED HEALTH CARE EDUCATION/TRAINING PROGRAM

## 2022-07-11 PROCEDURE — 80053 COMPREHEN METABOLIC PANEL: CPT

## 2022-07-11 PROCEDURE — 85018 HEMOGLOBIN: CPT

## 2022-07-11 PROCEDURE — A4216 STERILE WATER/SALINE, 10 ML: HCPCS

## 2022-07-11 PROCEDURE — 82962 GLUCOSE BLOOD TEST: CPT

## 2022-07-11 PROCEDURE — 36592 COLLECT BLOOD FROM PICC: CPT

## 2022-07-11 PROCEDURE — 84100 ASSAY OF PHOSPHORUS: CPT

## 2022-07-11 PROCEDURE — 6360000002 HC RX W HCPCS

## 2022-07-11 PROCEDURE — 84145 PROCALCITONIN (PCT): CPT

## 2022-07-11 PROCEDURE — 90935 HEMODIALYSIS ONE EVALUATION: CPT

## 2022-07-11 PROCEDURE — 5A1D70Z PERFORMANCE OF URINARY FILTRATION, INTERMITTENT, LESS THAN 6 HOURS PER DAY: ICD-10-PCS | Performed by: FAMILY MEDICINE

## 2022-07-11 PROCEDURE — 6360000002 HC RX W HCPCS: Performed by: INTERNAL MEDICINE

## 2022-07-11 PROCEDURE — 36415 COLL VENOUS BLD VENIPUNCTURE: CPT

## 2022-07-11 PROCEDURE — 83605 ASSAY OF LACTIC ACID: CPT

## 2022-07-11 PROCEDURE — 93005 ELECTROCARDIOGRAM TRACING: CPT | Performed by: INTERNAL MEDICINE

## 2022-07-11 PROCEDURE — 87081 CULTURE SCREEN ONLY: CPT

## 2022-07-11 PROCEDURE — APPSS45 APP SPLIT SHARED TIME 31-45 MINUTES: Performed by: NURSE PRACTITIONER

## 2022-07-11 PROCEDURE — 85014 HEMATOCRIT: CPT

## 2022-07-11 PROCEDURE — 86140 C-REACTIVE PROTEIN: CPT

## 2022-07-11 PROCEDURE — 36556 INSERT NON-TUNNEL CV CATH: CPT

## 2022-07-11 PROCEDURE — C9113 INJ PANTOPRAZOLE SODIUM, VIA: HCPCS

## 2022-07-11 PROCEDURE — 84484 ASSAY OF TROPONIN QUANT: CPT

## 2022-07-11 PROCEDURE — 82550 ASSAY OF CK (CPK): CPT

## 2022-07-11 PROCEDURE — 85025 COMPLETE CBC W/AUTO DIFF WBC: CPT

## 2022-07-11 PROCEDURE — 2580000003 HC RX 258

## 2022-07-11 PROCEDURE — 87040 BLOOD CULTURE FOR BACTERIA: CPT

## 2022-07-11 PROCEDURE — 83735 ASSAY OF MAGNESIUM: CPT

## 2022-07-11 PROCEDURE — 99223 1ST HOSP IP/OBS HIGH 75: CPT | Performed by: FAMILY MEDICINE

## 2022-07-11 PROCEDURE — 6370000000 HC RX 637 (ALT 250 FOR IP): Performed by: STUDENT IN AN ORGANIZED HEALTH CARE EDUCATION/TRAINING PROGRAM

## 2022-07-11 PROCEDURE — 99285 EMERGENCY DEPT VISIT HI MDM: CPT

## 2022-07-11 PROCEDURE — 84443 ASSAY THYROID STIM HORMONE: CPT

## 2022-07-11 PROCEDURE — 99222 1ST HOSP IP/OBS MODERATE 55: CPT | Performed by: INTERNAL MEDICINE

## 2022-07-11 PROCEDURE — 2580000003 HC RX 258: Performed by: STUDENT IN AN ORGANIZED HEALTH CARE EDUCATION/TRAINING PROGRAM

## 2022-07-11 PROCEDURE — C1751 CATH, INF, PER/CENT/MIDLINE: HCPCS

## 2022-07-11 PROCEDURE — 02HV33Z INSERTION OF INFUSION DEVICE INTO SUPERIOR VENA CAVA, PERCUTANEOUS APPROACH: ICD-10-PCS | Performed by: FAMILY MEDICINE

## 2022-07-11 PROCEDURE — 71045 X-RAY EXAM CHEST 1 VIEW: CPT

## 2022-07-11 RX ORDER — MIDODRINE HYDROCHLORIDE 5 MG/1
5 TABLET ORAL
Status: DISCONTINUED | OUTPATIENT
Start: 2022-07-11 | End: 2022-07-11

## 2022-07-11 RX ORDER — INSULIN GLARGINE 100 [IU]/ML
5 INJECTION, SOLUTION SUBCUTANEOUS NIGHTLY
Status: DISCONTINUED | OUTPATIENT
Start: 2022-07-11 | End: 2022-07-18

## 2022-07-11 RX ORDER — PROCHLORPERAZINE MALEATE 5 MG/1
5 TABLET ORAL EVERY 6 HOURS PRN
COMMUNITY
End: 2022-08-01

## 2022-07-11 RX ORDER — ALLOPURINOL 100 MG/1
100 TABLET ORAL DAILY
Status: DISCONTINUED | OUTPATIENT
Start: 2022-07-11 | End: 2022-07-22 | Stop reason: HOSPADM

## 2022-07-11 RX ORDER — ZINC OXIDE 100 MG/G
1 CREAM TOPICAL 2 TIMES DAILY
Status: ON HOLD | COMMUNITY
End: 2022-08-10 | Stop reason: HOSPADM

## 2022-07-11 RX ORDER — ASPIRIN 81 MG/1
81 TABLET ORAL DAILY
Status: DISCONTINUED | OUTPATIENT
Start: 2022-07-11 | End: 2022-07-22 | Stop reason: HOSPADM

## 2022-07-11 RX ORDER — SEVELAMER CARBONATE 800 MG/1
1 TABLET, FILM COATED ORAL
Status: ON HOLD | COMMUNITY
End: 2022-07-21 | Stop reason: HOSPADM

## 2022-07-11 RX ORDER — ONDANSETRON 2 MG/ML
4 INJECTION INTRAMUSCULAR; INTRAVENOUS EVERY 6 HOURS PRN
Status: DISCONTINUED | OUTPATIENT
Start: 2022-07-11 | End: 2022-07-22 | Stop reason: HOSPADM

## 2022-07-11 RX ORDER — DEXTROSE MONOHYDRATE 50 MG/ML
100 INJECTION, SOLUTION INTRAVENOUS PRN
Status: DISCONTINUED | OUTPATIENT
Start: 2022-07-11 | End: 2022-07-22 | Stop reason: HOSPADM

## 2022-07-11 RX ORDER — INSULIN LISPRO 100 [IU]/ML
0-6 INJECTION, SOLUTION INTRAVENOUS; SUBCUTANEOUS
Status: DISCONTINUED | OUTPATIENT
Start: 2022-07-11 | End: 2022-07-22 | Stop reason: HOSPADM

## 2022-07-11 RX ORDER — MECLIZINE HCL 12.5 MG/1
12.5 TABLET ORAL EVERY 8 HOURS PRN
Status: ON HOLD | COMMUNITY
End: 2022-08-10 | Stop reason: HOSPADM

## 2022-07-11 RX ORDER — ATORVASTATIN CALCIUM 40 MG/1
80 TABLET, FILM COATED ORAL NIGHTLY
Status: DISCONTINUED | OUTPATIENT
Start: 2022-07-11 | End: 2022-07-22 | Stop reason: HOSPADM

## 2022-07-11 RX ORDER — SODIUM CHLORIDE 0.9 % (FLUSH) 0.9 %
5-40 SYRINGE (ML) INJECTION EVERY 12 HOURS SCHEDULED
Status: DISCONTINUED | OUTPATIENT
Start: 2022-07-11 | End: 2022-07-18 | Stop reason: SDUPTHER

## 2022-07-11 RX ORDER — ACETAMINOPHEN 650 MG/1
650 SUPPOSITORY RECTAL EVERY 6 HOURS PRN
Status: DISCONTINUED | OUTPATIENT
Start: 2022-07-11 | End: 2022-07-22 | Stop reason: HOSPADM

## 2022-07-11 RX ORDER — 0.9 % SODIUM CHLORIDE 0.9 %
500 INTRAVENOUS SOLUTION INTRAVENOUS ONCE
Status: DISCONTINUED | OUTPATIENT
Start: 2022-07-11 | End: 2022-07-18

## 2022-07-11 RX ORDER — 0.9 % SODIUM CHLORIDE 0.9 %
250 INTRAVENOUS SOLUTION INTRAVENOUS ONCE
Status: COMPLETED | OUTPATIENT
Start: 2022-07-11 | End: 2022-07-11

## 2022-07-11 RX ORDER — SODIUM CHLORIDE 9 MG/ML
INJECTION, SOLUTION INTRAVENOUS PRN
Status: DISCONTINUED | OUTPATIENT
Start: 2022-07-11 | End: 2022-07-18 | Stop reason: SDUPTHER

## 2022-07-11 RX ORDER — POLYETHYLENE GLYCOL 3350 17 G/17G
17 POWDER, FOR SOLUTION ORAL DAILY PRN
Status: DISCONTINUED | OUTPATIENT
Start: 2022-07-11 | End: 2022-07-18

## 2022-07-11 RX ORDER — ONDANSETRON 4 MG/1
4 TABLET, ORALLY DISINTEGRATING ORAL EVERY 8 HOURS PRN
Status: DISCONTINUED | OUTPATIENT
Start: 2022-07-11 | End: 2022-07-22 | Stop reason: HOSPADM

## 2022-07-11 RX ORDER — OXYCODONE HYDROCHLORIDE AND ACETAMINOPHEN 5; 325 MG/1; MG/1
2 TABLET ORAL EVERY 8 HOURS PRN
Status: ON HOLD | COMMUNITY
End: 2022-08-10 | Stop reason: HOSPADM

## 2022-07-11 RX ORDER — LACTOBACILLUS ACIDOPHILUS 500MM CELL
1 CAPSULE ORAL EVERY MORNING
Status: ON HOLD | COMMUNITY
End: 2022-08-10 | Stop reason: HOSPADM

## 2022-07-11 RX ORDER — MIDODRINE HYDROCHLORIDE 5 MG/1
10 TABLET ORAL
Status: DISCONTINUED | OUTPATIENT
Start: 2022-07-12 | End: 2022-07-13

## 2022-07-11 RX ORDER — 0.9 % SODIUM CHLORIDE 0.9 %
500 INTRAVENOUS SOLUTION INTRAVENOUS ONCE
Status: COMPLETED | OUTPATIENT
Start: 2022-07-11 | End: 2022-07-11

## 2022-07-11 RX ORDER — GABAPENTIN 100 MG/1
200 CAPSULE ORAL 3 TIMES DAILY
Status: DISCONTINUED | OUTPATIENT
Start: 2022-07-11 | End: 2022-07-22 | Stop reason: HOSPADM

## 2022-07-11 RX ORDER — ACETAMINOPHEN 325 MG/1
650 TABLET ORAL EVERY 4 HOURS PRN
COMMUNITY

## 2022-07-11 RX ORDER — SODIUM CHLORIDE 0.9 % (FLUSH) 0.9 %
5-40 SYRINGE (ML) INJECTION PRN
Status: DISCONTINUED | OUTPATIENT
Start: 2022-07-11 | End: 2022-07-18 | Stop reason: SDUPTHER

## 2022-07-11 RX ORDER — MIDODRINE HYDROCHLORIDE 5 MG/1
5 TABLET ORAL ONCE
Status: COMPLETED | OUTPATIENT
Start: 2022-07-11 | End: 2022-07-11

## 2022-07-11 RX ORDER — DOPAMINE HYDROCHLORIDE 320 MG/100ML
1-20 INJECTION, SOLUTION INTRAVENOUS CONTINUOUS
Status: DISCONTINUED | OUTPATIENT
Start: 2022-07-11 | End: 2022-07-13

## 2022-07-11 RX ORDER — SODIUM CHLORIDE 9 MG/ML
INJECTION, SOLUTION INTRAVENOUS CONTINUOUS
Status: DISCONTINUED | OUTPATIENT
Start: 2022-07-11 | End: 2022-07-18

## 2022-07-11 RX ORDER — INSULIN LISPRO 100 [IU]/ML
0-3 INJECTION, SOLUTION INTRAVENOUS; SUBCUTANEOUS NIGHTLY
Status: DISCONTINUED | OUTPATIENT
Start: 2022-07-11 | End: 2022-07-22 | Stop reason: HOSPADM

## 2022-07-11 RX ORDER — DOXYCYCLINE HYCLATE 100 MG/1
100 CAPSULE ORAL EVERY 12 HOURS SCHEDULED
Status: DISCONTINUED | OUTPATIENT
Start: 2022-07-11 | End: 2022-07-22 | Stop reason: HOSPADM

## 2022-07-11 RX ORDER — ACETAMINOPHEN 325 MG/1
650 TABLET ORAL EVERY 6 HOURS PRN
Status: DISCONTINUED | OUTPATIENT
Start: 2022-07-11 | End: 2022-07-22 | Stop reason: HOSPADM

## 2022-07-11 RX ORDER — MIRTAZAPINE 7.5 MG/1
7.5 TABLET, FILM COATED ORAL NIGHTLY
COMMUNITY

## 2022-07-11 RX ORDER — ASPIRIN 81 MG/1
81 TABLET ORAL EVERY MORNING
Status: ON HOLD | COMMUNITY
End: 2022-08-10 | Stop reason: HOSPADM

## 2022-07-11 RX ORDER — HEPARIN SODIUM 10000 [USP'U]/ML
5000 INJECTION, SOLUTION INTRAVENOUS; SUBCUTANEOUS EVERY 8 HOURS SCHEDULED
Status: DISCONTINUED | OUTPATIENT
Start: 2022-07-11 | End: 2022-07-14

## 2022-07-11 RX ORDER — LANTHANUM CARBONATE 500 MG/1
1000 TABLET, CHEWABLE ORAL
Status: DISCONTINUED | OUTPATIENT
Start: 2022-07-11 | End: 2022-07-21

## 2022-07-11 RX ADMIN — SODIUM CHLORIDE 500 ML: 9 INJECTION, SOLUTION INTRAVENOUS at 00:45

## 2022-07-11 RX ADMIN — SODIUM CHLORIDE: 9 INJECTION, SOLUTION INTRAVENOUS at 23:34

## 2022-07-11 RX ADMIN — SODIUM CHLORIDE 40 MG: 9 INJECTION, SOLUTION INTRAMUSCULAR; INTRAVENOUS; SUBCUTANEOUS at 12:50

## 2022-07-11 RX ADMIN — GABAPENTIN 200 MG: 100 CAPSULE ORAL at 20:24

## 2022-07-11 RX ADMIN — GABAPENTIN 200 MG: 100 CAPSULE ORAL at 12:49

## 2022-07-11 RX ADMIN — ONDANSETRON 4 MG: 2 INJECTION INTRAMUSCULAR; INTRAVENOUS at 17:56

## 2022-07-11 RX ADMIN — SODIUM CHLORIDE, PRESERVATIVE FREE 10 ML: 5 INJECTION INTRAVENOUS at 20:24

## 2022-07-11 RX ADMIN — MIDODRINE HYDROCHLORIDE 5 MG: 5 TABLET ORAL at 12:49

## 2022-07-11 RX ADMIN — MIDODRINE HYDROCHLORIDE 10 MG: 5 TABLET ORAL at 16:08

## 2022-07-11 RX ADMIN — Medication 16 G: at 17:55

## 2022-07-11 RX ADMIN — DOXYCYCLINE HYCLATE 100 MG: 100 CAPSULE ORAL at 20:24

## 2022-07-11 RX ADMIN — DOPAMINE HYDROCHLORIDE 5 MCG/KG/MIN: 320 INJECTION, SOLUTION INTRAVENOUS at 16:59

## 2022-07-11 RX ADMIN — ALLOPURINOL 100 MG: 100 TABLET ORAL at 12:49

## 2022-07-11 RX ADMIN — TICAGRELOR 90 MG: 90 TABLET ORAL at 20:30

## 2022-07-11 RX ADMIN — HEPARIN SODIUM 5000 UNITS: 10000 INJECTION, SOLUTION INTRAVENOUS; SUBCUTANEOUS at 22:15

## 2022-07-11 RX ADMIN — Medication 500 ML: at 16:40

## 2022-07-11 RX ADMIN — HEPARIN SODIUM 5000 UNITS: 10000 INJECTION, SOLUTION INTRAVENOUS; SUBCUTANEOUS at 15:01

## 2022-07-11 RX ADMIN — ATORVASTATIN CALCIUM 80 MG: 40 TABLET, FILM COATED ORAL at 20:24

## 2022-07-11 RX ADMIN — DOXYCYCLINE HYCLATE 100 MG: 100 CAPSULE ORAL at 12:49

## 2022-07-11 RX ADMIN — SODIUM CHLORIDE 250 ML: 9 INJECTION, SOLUTION INTRAVENOUS at 03:08

## 2022-07-11 RX ADMIN — SODIUM CHLORIDE 500 ML: 9 INJECTION, SOLUTION INTRAVENOUS at 01:36

## 2022-07-11 RX ADMIN — MIDODRINE HYDROCHLORIDE 5 MG: 5 TABLET ORAL at 04:55

## 2022-07-11 RX ADMIN — TICAGRELOR 90 MG: 90 TABLET ORAL at 14:55

## 2022-07-11 RX ADMIN — ASPIRIN 81 MG: 81 TABLET, COATED ORAL at 12:49

## 2022-07-11 RX ADMIN — SODIUM CHLORIDE: 9 INJECTION, SOLUTION INTRAVENOUS at 06:12

## 2022-07-11 ASSESSMENT — ENCOUNTER SYMPTOMS
WHEEZING: 0
EYE PAIN: 0
SORE THROAT: 0
COUGH: 0
EYE DISCHARGE: 0
COLOR CHANGE: 0
CONSTIPATION: 0
ABDOMINAL PAIN: 1
ABDOMINAL PAIN: 0
BACK PAIN: 0
TROUBLE SWALLOWING: 0
EYE REDNESS: 0
SHORTNESS OF BREATH: 0
DIARRHEA: 0
NAUSEA: 0
SINUS PRESSURE: 0
BACK PAIN: 1
PHOTOPHOBIA: 0
ABDOMINAL DISTENTION: 0
VOMITING: 0
VOICE CHANGE: 0

## 2022-07-11 ASSESSMENT — PAIN SCALES - GENERAL
PAINLEVEL_OUTOF10: 0
PAINLEVEL_OUTOF10: 0

## 2022-07-11 NOTE — H&P
Critical Care Admit/Consult Note         Patient Yessi Tellez   MRN -  [id number]   Acct # - [de-identified]   - 1955      Date of Admission -  2022 12:11 AM  Date of evaluation -  2022  [address] Day - 0    ADMIT/CONSULT DETAILS     Reason for Admit/Consult   Hypotension and bradycardia in setting of EKG changes  [address] [facility],  Primary Care Physician - Brandi Melgar MD       HPI   The patient is a 78 y.o. female with significant past medical history of ESRD on MWF dialysis, Anemia of CKD, Hyperparathyroidism, HTN, DM, CAD, Breast cancer, Osteomyelitis/discitis, spinal fusion, glaucoma. Patient with cardiac catherization showing RCA stenosis in stent with balloon angioplasty, patent LAD stent and total occlusion of Left Circumflex on 2022. Patient presented to the ER with complaints of hypotension after being given 2 percocet at [facility] where patient resides. Patient received 3 doses of midodrine prior to arrival with report of no change. BP on arrival 80/49 in ED, with bradycardia of 47. Patient reporting fatigue and generalized malaise similar to previous episodes following dialysis when patient had \"too much fluid off\", which she reports has improved with fluid replacement. Patient with MWF dialysis at [facility] through AV fistula on left. Patient was given fluids, noted to have improvement. Patient initially admitted and was started on dialysis when Dr. Erasmo Orta noted bradycardia and hypotension with EKG changes and decided to discontinue dialysis with decision to transfer patient to ICU for cardiac evaluation. Past Medical History         Diagnosis Date    Acute infection of bone ([address]     infection of rt foot, resolved.     Acute osteomyelitis of phalanx of left hand ([address] 2022    Left third distal phalanx    Anemia of chronic disease     Arthritis     Breast cancer ([address] right breast, 2008/ bladder, 2006- last chemotherapy \"years ago\"    CAD (coronary artery disease)     Carpal tunnel syndrome     bilat - for OR left hand 3-17-20     Chronic diastolic CHF (congestive heart failure) ([address] 09/23/2014 9/23/14- echocardiogram revealed moderate LV concentric hypertrophy, stage III diastolic dysfunction, mild tricuspid regurgitation    CKD (chronic kidney disease) stage 4, GFR 15-29 ml/min ([facility])     Diabetic retinopathy ([address]     Glaucoma     Hemodialysis patient ([address]     [facility]- Dr. Mundo Lazo - left arm fistula     Hyperkalemia, diminished renal excretion 11/9/2017    Hyperlipidemia     Hypertension     Hypoglycemia unawareness in type 1 diabetes mellitus ([address] 11/7/2017    Insulin dependent type 2 diabetes mellitus ([address]     Neuropathy     feet    Osteomyelitis due to secondary diabetes ([address]     rt great toe with amputation    Patient is Zoroastrian 11/7/2017    Refusal of blood product     patient states she dose not take blood transfusion    Ventricular hypertrophy     Ventricular tachycardia ([address] 5/24/2021    Vitreous hemorrhage ([address]     left eye        Past Surgical History           Procedure Laterality Date    AMPUTATION      right great toe    ANKLE SURGERY      correction on charcot joint of right ankle    BONE BIOPSY N/A 04/18/2022    BONE BIOPSY PERCUTANEOUS L1/L2 performed by Nabeel Monson MD at [address] [place] [address]  12/09/2021    Dr Gary Liao Left 03/17/2020    LEFT CARPAL TUNNEL RELEASE performed by Aayush Oliveira MD at [address]      bilateral    CHOLECYSTECTOMY      COLONOSCOPY      CORONARY ANGIOPLASTY  05/05/2022    Dr. Kieran Cheng - RCA angioplasty    Philipradha Mir Left 01/31/2018    upper arm/Dr. Eren Reddy    ECHO COMPL W DOP COLOR FLOW  02/14/2013         ECHO COMPLETE  09/17/2013         FINGER AMPUTATION Left 01/20/2022 0-6 Units SubCUTAneous TID     insulin lispro  0-3 Units SubCUTAneous Nightly    lanthanum  1,000 mg Oral TID      sodium chloride flush, sodium chloride, ondansetron **OR** ondansetron, polyethylene glycol, acetaminophen **OR** acetaminophen  IV Drips/Infusions   sodium chloride 75 mL/hr at 07/11/22 0612    sodium chloride       Home Medications  Medications Prior to Admission: acetaminophen (TYLENOL) 325 MG tablet, Take 650 mg by mouth every 4 hours as needed for Pain  Acidophilus Lactobacillus CAPS, Take 1 capsule by mouth every morning  aspirin 81 MG EC tablet, Take 81 mg by mouth every morning  meclizine (ANTIVERT) 12.5 MG tablet, Take 12.5 mg by mouth every 8 hours as needed for Dizziness  mirtazapine (REMERON) 7.5 MG tablet, Take 7.5 mg by mouth nightly  oxyCODONE-acetaminophen (PERCOCET) 5-325 MG per tablet, Take 2 tablets by mouth every 8 hours as needed for Pain.  prochlorperazine (COMPAZINE) 5 MG tablet, Take 5 mg by mouth every 6 hours as needed (NAUSEA AND VOMITING)  sevelamer (RENVELA) 800 MG tablet, Take 1 tablet by mouth 3 times daily (with meals)  ZINC OXIDE, TOPICAL, (SECURA PROTECTIVE) 10 % CREA, Apply 1 Dose topically 2 times daily -BUTTOCKS-  Vancomycin HCl in Dextrose (VANCOCIN HCL IV), Infuse 1,000 mg intravenously every 72 hours **MON-WED-FRI** -SEND TO DIALYSIS W/ PT-  doxycycline hyclate (VIBRAMYCIN) 100 MG capsule, Take 1 capsule by mouth every 12 hours  midodrine (PROAMATINE) 5 MG tablet, Take 5 mg by mouth daily as needed (SBP <100. COURTNEY REPEAT X 1 DURING DIALYSIS)   gabapentin (NEURONTIN) 100 MG capsule, Take 2 capsules by mouth 3 times daily for 180 days.   atorvastatin (LIPITOR) 80 MG tablet, Take 80 mg by mouth nightly  bumetanide (BUMEX) 2 MG tablet, Take 2 mg by mouth three times a week **SUN-TUE-THURS**  isosorbide mononitrate (IMDUR) 30 MG extended release tablet, Take 30 mg by mouth daily  insulin glargine (LANTUS) 100 UNIT/ML injection vial, Inject 5 Units into the skin nightly   lidocaine-prilocaine (EMLA) 2.5-2.5 % cream, Apply 1 Dose topically three times a week **MON-WED-FRI** TO THE SHUNT SITE  metoprolol succinate (TOPROL XL) 25 MG extended release tablet, Take 1 tablet by mouth daily  lanthanum (FOSRENOL) 1000 MG chewable tablet, Take 1,000 mg by mouth 3 times daily (with meals)   allopurinol (ZYLOPRIM) 100 MG tablet, Take 1 tablet by mouth daily  ticagrelor (BRILINTA) 90 MG TABS tablet, Take 1 tablet by mouth 2 times daily  B Complex-C-Folic Acid (BONI CAPS) 1 MG CAPS, Take 1 mg by mouth nightly   omeprazole (PRILOSEC) 20 MG delayed release capsule, Take 20 mg by mouth daily as needed (FOR GI UPSET)   LUMIGAN 0.01 % SOLN ophthalmic drops, Place 1 drop into the right eye nightly   COMBIGAN 0.2-0.5 % ophthalmic solution, Place 1 drop into the right eye every 12 hours   Diet/Nutrition   Diet NPO  Allergies   Furosemide  Social History   Tobacco   reports that she has never smoked. She has never used smokeless tobacco.    Alcohol     reports no history of alcohol use. Occupational history :    Family History         Problem Relation Age of Onset    Breast Cancer Mother 61    Hypertension Mother     Heart Disease Father     Prostate Cancer Father     Breast Cancer Maternal Grandmother 60       Sleep History   n/a    ROS     REVIEW OF SYSTEMS:  Review of Systems   Constitutional: Positive for fatigue. Negative for chills and fever. HENT: Negative for trouble swallowing and voice change. Eyes: Negative for photophobia and visual disturbance. Respiratory: Negative for cough and shortness of breath. Cardiovascular: Negative for chest pain and palpitations. Gastrointestinal: Positive for abdominal pain. Negative for diarrhea, nausea and vomiting. Genitourinary: Negative for dysuria, frequency and urgency. Musculoskeletal: Positive for back pain. Negative for neck pain and neck stiffness. Skin: Negative for rash and wound.    Neurological: Positive for dizziness (improved). Negative for weakness and light-headedness. Psychiatric/Behavioral: Negative for behavioral problems and confusion. The patient is nervous/anxious. Lines and Devices   Left HD fistula  Implanted Venous port  Right PIV    Mechanical Ventilation Data   VENT SETTINGS (Comprehensive)     Additional Respiratory Assessments  Heart Rate: (!) 44  Resp: 20  SpO2: 99 %    ABG  Lab Results   Component Value Date/Time    PH 7.390 06/15/2022 09:39 PM    PCO2 46.6 06/15/2022 09:39 PM    PO2 104.6 06/15/2022 09:39 PM    HCO3 27.6 06/15/2022 09:39 PM    O2SAT 98.6 06/15/2022 09:39 PM     Lab Results   Component Value Date/Time    MODE 8L breathing tx 06/15/2022 09:39 PM     Vitals    height is 5' 10\" (1.778 m) and weight is 145 lb 1 oz (65.8 kg). Her temporal temperature is 95.9 °F (35.5 °C) (abnormal). Her blood pressure is 163/74 (abnormal) and her pulse is 44 (abnormal). Her respiration is 20 and oxygen saturation is 99%.        Temperature Range: Temp: (!) 95.9 °F (35.5 °C) Temp  Av.5 °F (36.4 °C)  Min: 95.9 °F (35.5 °C)  Max: 98.6 °F (37 °C)  BP Range:  Systolic (92QQX), LAD:771 , Min:68 , LPS:260     Diastolic (68ESK), NTB:65, Min:43, Max:74    Pulse Range: Pulse  Av  Min: 39  Max: 51  Respiration Range: Resp  Av.9  Min: 16  Max: 20  Current Pulse Ox[de-identified]  SpO2: 99 %  24HR Pulse Ox Range:  SpO2  Av.6 %  Min: 94 %  Max: 99 %  Oxygen Amount and Delivery:        I/O (24 Hours)    Patient Vitals for the past 8 hrs:   BP Temp Temp src Pulse Resp SpO2 Weight   22 1140 (!) 163/74 (!) 95.9 °F (35.5 °C) Temporal (!) 44 20 99 % 145 lb 1 oz (65.8 kg)   22 1103 (!) 104/59 98.6 °F (37 °C) -- (!) 41 16 -- --   22 1045 (!) 78/45 -- -- (!) 39 -- -- --   22 1030 (!) 74/46 -- -- (!) 39 -- -- --   22 1004 138/68 97.8 °F (36.6 °C) Oral (!) 41 16 -- --   22 0845 137/65 98 °F (36.7 °C) Oral (!) 42 16 98 % --   22 0645 (!) 83/54 -- -- (!) 43 -- -- -- 07/11/22 0615 (!) 101/56 -- -- (!) 45 -- -- --   07/11/22 0613 (!) 92/56 -- -- (!) 44 18 96 % --       Intake/Output Summary (Last 24 hours) at 7/11/2022 1213  Last data filed at 7/11/2022 1103  Gross per 24 hour   Intake 300 ml   Output 400 ml   Net -100 ml     No intake/output data recorded. Date 07/11/22 0000 - 07/11/22 2359   Shift 1900-6561 [id number] 9280-4216 24 Hour Total   INTAKE   Dialysis(mL/kg)  300(4.6)  300(4.6)   Shift Total(mL/kg)  300(4.6)  300(4.6)   OUTPUT   Dialysis  400  400   Shift Total(mL/kg)  400(6.1)  400(6.1)   Weight (kg) 60.8 65.8 65.8 65.8     Patient Vitals for the past 96 hrs (Last 3 readings):   Weight   07/11/22 1140 145 lb 1 oz (65.8 kg)   07/11/22 0033 134 lb (60.8 kg)         Drains/Tubes Outputs      Exam   Physical Exam  Vitals reviewed. Constitutional:       General: She is not in acute distress. Appearance: Normal appearance. HENT:      Head: Normocephalic. Right Ear: External ear normal.      Left Ear: External ear normal.      Nose: Nose normal.      Mouth/Throat:      Pharynx: Oropharynx is clear. Eyes:      General:         Right eye: No discharge. Left eye: No discharge. Conjunctiva/sclera: Conjunctivae normal.   Cardiovascular:      Rate and Rhythm: Regular rhythm. Bradycardia present. Heart sounds: No friction rub. No gallop. Pulmonary:      Effort: Pulmonary effort is normal. No respiratory distress. Breath sounds: No stridor. No rhonchi or rales. Abdominal:      General: There is no distension. Palpations: Abdomen is soft. Tenderness: There is abdominal tenderness. There is no guarding or rebound. Musculoskeletal:         General: Tenderness present. No deformity. Cervical back: Normal range of motion and neck supple. No rigidity or tenderness. Right lower leg: No edema. Left lower leg: No edema. Skin:     General: Skin is warm. Coloration: Skin is not jaundiced.       Findings: No erythema. Neurological:      Mental Status: She is alert and oriented to person, place, and time. Sensory: No sensory deficit. Motor: No weakness. Psychiatric:         Mood and Affect: Mood normal.         Behavior: Behavior normal.        Data   Old records and images have been reviewed    Lab Results   CBC     Lab Results   Component Value Date/Time    WBC 5.0 07/11/2022 12:46 AM    RBC 4.26 07/11/2022 12:46 AM    HGB 12.1 07/11/2022 12:46 AM    HCT 38.8 07/11/2022 12:46 AM     07/11/2022 12:46 AM    MCV 91.1 07/11/2022 12:46 AM    MCH 28.4 07/11/2022 12:46 AM    MCHC 31.2 07/11/2022 12:46 AM    RDW 17.3 07/11/2022 12:46 AM    NRBC 0.0 02/18/2017 04:00 AM    SEGSPCT 70 03/12/2014 10:53 AM    BANDSPCT 1 02/18/2015 10:35 AM    LYMPHOPCT 25.5 07/11/2022 12:46 AM    PROMYELOPCT 1.8 02/18/2017 04:00 AM    MONOPCT 11.6 07/11/2022 12:46 AM    MYELOPCT 0.9 10/24/2017 10:45 AM    BASOPCT 0.6 07/11/2022 12:46 AM    MONOSABS 0.58 07/11/2022 12:46 AM    LYMPHSABS 1.28 07/11/2022 12:46 AM    EOSABS 0.12 07/11/2022 12:46 AM    BASOSABS 0.03 07/11/2022 12:46 AM       BMP   Lab Results   Component Value Date/Time     07/11/2022 12:46 AM    K 4.7 07/11/2022 12:46 AM    CL 96 07/11/2022 12:46 AM    CO2 26 07/11/2022 12:46 AM    BUN 45 07/11/2022 12:46 AM    CREATININE 4.8 07/11/2022 12:46 AM    GLUCOSE 171 07/11/2022 12:46 AM    GLUCOSE 46 04/02/2012 11:00 AM    CALCIUM 9.8 07/11/2022 12:46 AM       LFTS  Lab Results   Component Value Date/Time    ALKPHOS 216 07/11/2022 12:46 AM    ALT 76 07/11/2022 12:46 AM    AST 75 07/11/2022 12:46 AM    PROT 6.2 07/11/2022 12:46 AM    BILITOT 0.3 07/11/2022 12:46 AM    BILIDIR <0.2 05/25/2021 05:20 AM    IBILI see below 05/25/2021 05:20 AM    LABALBU 3.2 07/11/2022 12:46 AM    LABALBU 3.8 04/02/2012 11:00 AM       INR  No results for input(s): PROTIME, INR in the last 72 hours. APTT  No results for input(s): APTT in the last 72 hours.     Lactic Acid  Lab Results Component Value Date/Time    LACTA 1.0 07/11/2022 12:46 AM    LACTA 1.0 06/15/2022 06:53 PM    LACTA 0.8 11/09/2017 12:20 PM        BNP   No results for input(s): BNP in the last 72 hours. Cultures     No results for input(s): BC in the last 72 hours. No results for input(s): Arik Grates in the last 72 hours. No results for input(s): LABURIN in the last 72 hours. Radiology     XR CHEST PORTABLE   Final Result   No acute process. SYSTEMS ASSESSMENT    Neuro   Alert, Oriented, able to answer questions without difficulty  Continue to monitor mentation   Respiratory   Patient arrived on NC  Denies any shortness of breath  100% on room air  Keep O2 sat 90-92%    Cardiovascular   Hypotension ( improved)  Given 1250 cc IVF  On NS infusion of 75cc/hr  Patient currently > 120/70  On midodrine at home, will resume  Previous ECHO with EF of 40-45% in 4/2022  Will continue to monitor     Bradycardia  Denies any chest pain  HR in upper 40's in monitor  EKG changes noted including 1st degree block  Previous Cardiac catherization with balloon angioplasty within previous stent in RCA noted on 5/4/2022, additional complete occlusion of LCx at that time.    On ASA and Ticragrelor at home, will continue  Cardiology consulted, appreciate input      Gastrointestinal   Reports of previous diarrhea, nausea, vomiting > 1 week ago  Monitor for any diarrhea or blood in stool    Renal   ESRD on Dialysis MWF  Patient with incomplete dialysis treatment today   Will require Dialysis tomorrow  Nephrology following        Infectious Disease   Blood cultures and urine cultures sent  Afebrile at this time     Chronic Vertebral Osteomyelitis   Continue home Vancocin 1000 mg 3x, doxycycline 100 mg BID  Hematology/Oncology   Monitor for any changes to Hgb, hGb 12.1 at this time    Endocrine   T2DM   Monitor glucose        Social/Spiritual/DNR/Other   Full Code per records  Patient from nursing facility, [address] in records    Yulia Cunha DO  Emergency Medicine PGY-2  12:13 PM  07/11/22 7/11/2022  12:13 PM    Please See Attending Note/Attestation for further information/ Final Plan      Critical Care Attending Addendum:    Patient seen and examined with the house staff. EKG and X-rays personally reviewed through the PACS. Family is updated at the bedside as available. Additional findings listed below as necessary. Additional comments:  1. Bradycardia with lateral ischemic changes. Am concerned for right coronary reocclusion but pt without any chest pain. Hypotension has resolved with hydration. Await cardiology evaluation. 2. ESRD for HD tomorrow. DW Renal attending. 3. Old records were reviewed. ADD: CTSP for hypotension and worsening bradycardia. Given 500 cc ns without benefit, spoke to cardiology and agreed with dopamine and possible transvenous if does not work. Consent for central line obtained from pt's brother as she was becoming altered.   > 30 min cct separate from procedure time

## 2022-07-11 NOTE — FLOWSHEET NOTE
07/11/22 1103   Vital Signs   BP (!) 104/59   Temp 98.6 °F (37 °C)   Heart Rate (!) 41   Resp 16   Pain Assessment   Pain Assessment None - Denies Pain   Post-Hemodialysis Assessment   Post-Treatment Procedures Blood returned; Access bleeding time < 10 minutes   Machine Disinfection Process Exterior Machine Disinfection   Rinseback Volume (ml) 300 ml   Blood Volume Processed (Liters) 15.2 l/min   Dialyzer Clearance Lightly streaked   Duration of Treatment (minutes) 60 minutes   Heparin Amount Administered During Treatment (mL) 0 units   Hemodialysis Intake (ml) 300 ml   Hemodialysis Output (ml) 400 ml   NET Removed (ml) 100   Tolerated Treatment Poor   Patient Response to Treatment P had bradycarida and hypotension, per Dr Susana Jenkins , stop HD pt will be transferred to ICU, will dialyze tomorrow.    Bilateral Breath Sounds Clear   Edema None   Physician Notified Yes   Time Off 0438   Patient Disposition Other (Comment)

## 2022-07-11 NOTE — PROGRESS NOTES
Family Medicine Senior Resident Attestation  I have seen and examined the patient alongside the [facility] resident and agree with their exam findings. I have discussed the case, including any pertinent history, exam findings, laboratory findings, and radiologic findings with the [place] resident. I agree with the assessment, plan, and orders as documented by the [facility] resident. Please refer to the [facility] resident note for additional information.   Autumn Myers DO PGY-3  7/11/2022 8:24 AM

## 2022-07-11 NOTE — PROGRESS NOTES
[facility]  Progress Note    Chief complaint :  Chief Complaint   Patient presents with    Hypotension     given 2 percocet and 3 doses of midodrine       Subjective:    Patient remained hypotensive overnight. Patient describes feeling \"fair\" this morning. She endorses some lightheadedness when sitting up quickly. She also endorses nausea but has not had any vomiting. Patient also states that she is constipated and believes that her last bowel movement was Sunday. Patient denies chest pain, SOB, vomiting, bloody stools, fever, chills, changes in urination. Patient is NPO. Past medical, surgical, family and social history were reviewed, non-contributory, and unchanged unless otherwise stated. Review of Systems   Constitutional: Negative for chills and fever. HENT: Positive for trouble swallowing. Negative for nosebleeds. Respiratory: Negative for chest tightness and shortness of breath. Cardiovascular: Negative for chest pain and palpitations. Gastrointestinal: Positive for constipation and nausea. Negative for abdominal distention, diarrhea and vomiting. Genitourinary: Negative for difficulty urinating and dysuria. Musculoskeletal: Positive for arthralgias (leg pain). Negative for neck pain. Skin: Negative for color change and rash. Neurological: Positive for light-headedness. Negative for headaches. Psychiatric/Behavioral: Negative for agitation and confusion. Objective:  BP (!) 87/40   Pulse 51   Temp 97.6 °F (36.4 °C) (Oral)   Resp 11   Ht 5' 10\" (1.778 m)   Wt 145 lb 1 oz (65.8 kg)   SpO2 94%   BMI 20.81 kg/m²     Physical Exam  Constitutional:       Appearance: Normal appearance. HENT:      Head: Normocephalic and atraumatic. Right Ear: External ear normal.      Left Ear: External ear normal.      Nose: Nose normal.      Mouth/Throat:      Pharynx: No oropharyngeal exudate or posterior oropharyngeal erythema.    Eyes: General: No scleral icterus. Right eye: No discharge. Left eye: No discharge. Extraocular Movements: Extraocular movements intact. Conjunctiva/sclera: Conjunctivae normal.      Pupils: Pupils are equal, round, and reactive to light. Cardiovascular:      Rate and Rhythm: Regular rhythm. Bradycardia present. Heart sounds: Normal heart sounds. No murmur heard. No friction rub. No gallop. Pulmonary:      Effort: Pulmonary effort is normal.      Breath sounds: Normal breath sounds. No wheezing, rhonchi or rales. Chest:      Chest wall: No tenderness. Abdominal:      General: Abdomen is flat. There is no distension. Palpations: Abdomen is soft. There is no mass. Tenderness: There is abdominal tenderness (minimal, diffuse). There is no guarding or rebound. Musculoskeletal:      Cervical back: Normal range of motion. Skin:     General: Skin is warm and dry. Neurological:      General: No focal deficit present. Mental Status: She is alert and oriented to person, place, and time. Psychiatric:         Mood and Affect: Mood normal.         Behavior: Behavior normal.         Thought Content:  Thought content normal.      R femoral CVC in place (day 1)    Labs:  Recent Results (from the past 24 hour(s))   Procalcitonin    Collection Time: 07/11/22 10:48 AM   Result Value Ref Range    Procalcitonin 0.42 (H) 0.00 - 0.08 ng/mL   C-Reactive Protein    Collection Time: 07/11/22 10:48 AM   Result Value Ref Range    CRP 1.4 (H) 0.0 - 0.4 mg/dL   Troponin    Collection Time: 07/11/22 10:48 AM   Result Value Ref Range    Troponin, High Sensitivity 69 (H) 0 - 9 ng/L   CK    Collection Time: 07/11/22 10:48 AM   Result Value Ref Range    Total CK 25 20 - 180 U/L   Magnesium    Collection Time: 07/11/22 10:48 AM   Result Value Ref Range    Magnesium 1.9 1.6 - 2.6 mg/dL   TSH    Collection Time: 07/11/22 10:48 AM   Result Value Ref Range    TSH 4.170 0.270 - 4.200 uIU/mL E12/L    Hemoglobin 12.9 11.5 - 15.5 g/dL    Hematocrit 41.5 34.0 - 48.0 %    MCV 89.8 80.0 - 99.9 fL    MCH 27.9 26.0 - 35.0 pg    MCHC 31.1 (L) 32.0 - 34.5 %    RDW 17.2 (H) 11.5 - 15.0 fL    Platelets 014 (L) 909 - 450 E9/L    MPV NOT CALC 7.0 - 12.0 fL    Neutrophils % 83.5 (H) 43.0 - 80.0 %    Immature Granulocytes % 0.4 0.0 - 5.0 %    Lymphocytes % 12.3 (L) 20.0 - 42.0 %    Monocytes % 3.4 2.0 - 12.0 %    Eosinophils % 0.2 0.0 - 6.0 %    Basophils % 0.2 0.0 - 2.0 %    Neutrophils Absolute 4.60 1.80 - 7.30 E9/L    Immature Granulocytes # 0.02 E9/L    Lymphocytes Absolute 0.68 (L) 1.50 - 4.00 E9/L    Monocytes Absolute 0.19 0.10 - 0.95 E9/L    Eosinophils Absolute 0.01 (L) 0.05 - 0.50 E9/L    Basophils Absolute 0.01 0.00 - 0.20 E9/L   Phosphorus    Collection Time: 07/12/22  6:11 AM   Result Value Ref Range    Phosphorus 5.2 (H) 2.5 - 4.5 mg/dL   Magnesium    Collection Time: 07/12/22  6:11 AM   Result Value Ref Range    Magnesium 2.1 1.6 - 2.6 mg/dL   Comprehensive Metabolic Panel    Collection Time: 07/12/22  6:11 AM   Result Value Ref Range    Sodium 137 132 - 146 mmol/L    Potassium 5.6 (H) 3.5 - 5.0 mmol/L    Chloride 101 98 - 107 mmol/L    CO2 21 (L) 22 - 29 mmol/L    Anion Gap 15 7 - 16 mmol/L    Glucose 143 (H) 74 - 99 mg/dL    BUN 41 (H) 6 - 23 mg/dL    CREATININE 4.2 (H) 0.5 - 1.0 mg/dL    GFR Non-African American 13 >=60 mL/min/1.73    GFR African American 13     Calcium 9.3 8.6 - 10.2 mg/dL    Total Protein 6.0 (L) 6.4 - 8.3 g/dL    Albumin 3.2 (L) 3.5 - 5.2 g/dL    Total Bilirubin 0.3 0.0 - 1.2 mg/dL    Alkaline Phosphatase 202 (H) 35 - 104 U/L    ALT 97 (H) 0 - 32 U/L     (H) 0 - 31 U/L       Radiology and other tests reviewed:  XR CHEST PORTABLE   Final Result   No acute process.              Assessment:  Active Hospital Problems    Diagnosis Date Noted    Atherosclerosis of native coronary artery of native heart without angina pectoris [I25.10]      Priority: High    Hypotension [I95.9] 07/11/2022     Priority: Medium    Vertebral osteomyelitis, chronic ([facility] [M46.20] 07/11/2022     Priority: Medium    Symptomatic sinus bradycardia [R00.1]      Priority: Medium    ESRD (end stage renal disease) on dialysis ([facility] [N18.6, Z99.2]     Mixed hyperlipidemia [E78.2]     Chronic diastolic CHF (congestive heart failure) ([facility] [I50.32] 09/23/2014       Plan:    1. Hypotension  - Hx of hypotension; home regimen: Midodrine 5 mg QD  - Received midodrine 5mg x3 and 1250 cc NS prior to admission without adequate improvement of BP  - NS 75 cc/hr  - Midodrine 10 mg TID  - Dopamine drip @ 2.5 mcg/kg/min  - Blood cx x2 pending  - Procal 0.42  - CRP 1.4  - ICU management    2. HFrEF  - Echo in 5/5/2022 shows EF 40-45% and mild mitral regurgitation  - Hold home Imdur 30 mg QD due to low BP  - Hold home Bumex 2 mg Sun-Tue-Thu  - Hold home Toprol 25 mg QD  - ICU management    3. ESRD on HD  - HD schedule is MWF. Patient is able to make some urine  - nephrology following  - plan for HD today  - Continue home Lanthanum 1000 mg three times per week  - UA and urine cx ordered    4. CAD s/p stent placement  - Recent cardiac catheterization 5/2022 that revealed in-stent stenosis of mid RCA with successful balloon angioplasty, patent stent in the LAD, totally occluded LCA  - EKG changes on this admission showing new 1st degree AV block  - Continue home ASA 81 mg QD  - Continue home Ticagrelor 90 mg BID  - ICU management    5. Dysphagia  - NPO  - Recommend bedside swallow study    6. Chronic Vertebral Osteomyelitis  - Continue home vancomycin 1000 mg three times per week  - Continue home doxycycline 100 mg BID  - ICU management    7. Hyperuricemia  - Continue home Allopurinol 100 mg BID    8. Hyperlipidemia  - Continue home Lipitor 80 mg nightly    9.  Type II Diabetes Mellitus  - Continue home Lantus 5U nightly  - Hypoglycemia protocol        Pain Control:  Tylenol 650 mg Q6HR PRN for mild pain  DVT ppx: Heparin 5000 units TID  GI ppx: Protonix 40 mg daily  Code Status: Full  Diet: BIBIO      Susanna Nolasco MD   Family Medicine Resident PGY-1  07/12/22   7:40 AM

## 2022-07-11 NOTE — ED PROVIDER NOTES
[facility]  Department of Emergency Medicine     Written by: Clementine Verde DO  Patient Name: Oswald Saleem  Attending Provider: Karrie Rodas DO  Admit Date: 2022 12:11 AM  MRN: [id number]                   : 1955        Chief Complaint   Patient presents with    Hypotension     given 2 percocet and 3 doses of midodrine    - Chief complaint    Patient is a 23-year-old female past medical history of hypertension, diabetes, CAD, CKD on ESRD. Patient presents from facility with chief complaint of low blood pressure. Patient currently resides at facility. Patient was found to have low blood pressure earlier today after given 2 Percocet. Patient was given 3 doses of midodrine with no improvement. On arrival patient is mildly hypotensive BP 80s over 50s. Patient does endorse some generalized fatigue and malaise. She states that symptoms have been moderate in severity and constant onset. She denies any exacerbating relieving factors. Patient states that she has had similar episodes in the past after dialysis when they have removed too much fluid and it usually improves when I have given fluid back. Patient denies any fevers, chills, nausea, vomiting, chest pain, cough or shortness of breath. The history is provided by the patient. No  was used. Review of Systems   Constitutional: Positive for fatigue. Negative for chills and fever. HENT: Negative for ear pain, sinus pressure and sore throat. Eyes: Negative for pain, discharge and redness. Respiratory: Negative for cough, shortness of breath and wheezing. Cardiovascular: Negative for chest pain. Gastrointestinal: Negative for abdominal distention, diarrhea, nausea and vomiting. Genitourinary: Negative for dysuria and frequency. Musculoskeletal: Negative for arthralgias and back pain. Skin: Negative for rash and wound. Neurological: Negative for weakness and headaches. Options  Hypotension, unspecified hypotension type  Diagnosis management comments: Patient is a 71-year-old female past medical history of hypertension, diabetes, CAD, CKD and ESRD. Patient presents with chief complaint of low blood pressure. Patient currently at facility found to have low blood pressure after given 2 Percocet. Blood pressure on arrival 80s over 50s. Patient also found to be mildly bradycardic. EKG obtained demonstrated sinus bradycardia no acute ischemic changes. Laboratory work obtained CBC unremarkable, CMP demonstrated chronically elevated creatinine as well as transaminitis. Patient given multiple boluses of IV fluids as well as midodrine with significant improvement in blood pressure. Findings consistent with hypotension likely secondary to hypovolemia in the setting of dialysis. Given that patient was able tensive as low as 60s over 40s. It is recommended that patient be admitted for observation. Patient was in agreement plan of care. Case discussed with family practice who agreed to admit patient. Plan of care discussed with patient including admission, all questions were answered, patient was in agreement plan of care and admitted to hospital in stable condition.        Amount and/or Complexity of Data Reviewed  Clinical lab tests: ordered and reviewed  Tests in the radiology section of CPT®: ordered and reviewed  Decide to obtain previous medical records or to obtain history from someone other than the patient: yes    Risk of Complications, Morbidity, and/or Mortality  Presenting problems: moderate  Diagnostic procedures: moderate  Management options: moderate    Patient Progress  Patient progress: stable             --------------------------------------------- PAST HISTORY ---------------------------------------------  Past Medical History:  has a past medical history of Acute infection of bone ([facility] Acute osteomyelitis of phalanx of left hand ([facility] Anemia of chronic disease, Arthritis, Breast cancer ([address] CAD (coronary artery disease), Carpal tunnel syndrome, Chronic diastolic CHF (congestive heart failure) ([address] CKD (chronic kidney disease) stage 4, GFR 15-29 ml/min ([address] Diabetic retinopathy ([address] Glaucoma, Hemodialysis patient ([address] Hyperkalemia, diminished renal excretion, Hyperlipidemia, Hypertension, Hypoglycemia unawareness in type 1 diabetes mellitus ([address] Insulin dependent type 2 diabetes mellitus ([address] Neuropathy, Osteomyelitis due to secondary diabetes [facility]), Patient is Mandaeism, Refusal of blood product, Ventricular hypertrophy, Ventricular tachycardia ([address] and Vitreous hemorrhage ([address] Past Surgical History:  has a past surgical history that includes Cholecystectomy; amputation; Mastectomy; Cystoscopy; Cataract removal; Ankle surgery; other surgical history (09/27/2011); ECHO Compl W Dop Color Flow (02/14/2013); Echo Complete (09/17/2013); spinal cord decompression; other surgical history (insertion lumbar drain insertion); other surgical history (10/22/2015); other surgical history (11/03/2015); Tunneled venous catheter placement (11/15/2017); Dialysis fistula creation (Left, 01/31/2018); vitrectomy (Left, 04/10/2018); pr rpr retinal dtchmnt w/vitrectomy any meth (Left, 04/10/2018); pr av anast,up arm basilic vein transposit (Left, 05/15/2018); pr ligatn angioaccess av fistula (Left, 09/25/2018); Colonoscopy; Carpal tunnel release (Left, 03/17/2020); transesophageal echocardiogram (11/11/2021); Cardiac catheterization (12/09/2021); Finger amputation (Left, 01/20/2022); bone biopsy (N/A, 04/18/2022); transesophageal echocardiogram (04/19/2022); Coronary angioplasty (05/05/2022); and Port Surgery (N/A, 6/27/2022). Social History:  reports that she has never smoked. She has never used smokeless tobacco. She reports that she does not drink alcohol and does not use drugs.     Family History: family history includes Breast Cancer (age of onset: 61) in her maternal grandmother and mother; Heart Disease in her father; Hypertension in her mother; Prostate Cancer in her father. The patients home medications have been reviewed.     Allergies: Furosemide    -------------------------------------------------- RESULTS -------------------------------------------------    LABS:  Results for orders placed or performed during the hospital encounter of 07/11/22   CBC with Auto Differential   Result Value Ref Range    WBC 5.0 4.5 - 11.5 E9/L    RBC 4.26 3.50 - 5.50 E12/L    Hemoglobin 12.1 11.5 - 15.5 g/dL    Hematocrit 38.8 34.0 - 48.0 %    MCV 91.1 80.0 - 99.9 fL    MCH 28.4 26.0 - 35.0 pg    MCHC 31.2 (L) 32.0 - 34.5 %    RDW 17.3 (H) 11.5 - 15.0 fL    Platelets 037 706 - 086 E9/L    MPV 12.7 (H) 7.0 - 12.0 fL    Neutrophils % 59.5 43.0 - 80.0 %    Immature Granulocytes % 0.4 0.0 - 5.0 %    Lymphocytes % 25.5 20.0 - 42.0 %    Monocytes % 11.6 2.0 - 12.0 %    Eosinophils % 2.4 0.0 - 6.0 %    Basophils % 0.6 0.0 - 2.0 %    Neutrophils Absolute 2.99 1.80 - 7.30 E9/L    Immature Granulocytes # 0.02 E9/L    Lymphocytes Absolute 1.28 (L) 1.50 - 4.00 E9/L    Monocytes Absolute 0.58 0.10 - 0.95 E9/L    Eosinophils Absolute 0.12 0.05 - 0.50 E9/L    Basophils Absolute 0.03 0.00 - 0.20 E9/L   Comprehensive Metabolic Panel w/ Reflex to MG   Result Value Ref Range    Sodium 136 132 - 146 mmol/L    Potassium reflex Magnesium 4.7 3.5 - 5.0 mmol/L    Chloride 96 (L) 98 - 107 mmol/L    CO2 26 22 - 29 mmol/L    Anion Gap 14 7 - 16 mmol/L    Glucose 171 (H) 74 - 99 mg/dL    BUN 45 (H) 6 - 23 mg/dL    CREATININE 4.8 (H) 0.5 - 1.0 mg/dL    GFR Non-African American 11 >=60 mL/min/1.73    GFR African American 11     Calcium 9.8 8.6 - 10.2 mg/dL    Total Protein 6.2 (L) 6.4 - 8.3 g/dL    Albumin 3.2 (L) 3.5 - 5.2 g/dL    Total Bilirubin 0.3 0.0 - 1.2 mg/dL    Alkaline Phosphatase 216 (H) 35 - 104 U/L    ALT 76 (H) 0 - 32 U/L    AST 75 (H) 0 - 31 U/L   Lactic Acid   Result Value Ref Range Lactic Acid 1.0 0.5 - 2.2 mmol/L   EKG 12 Lead   Result Value Ref Range    Ventricular Rate 46 BPM    Atrial Rate 46 BPM    P-R Interval 220 ms    QRS Duration 94 ms    Q-T Interval 504 ms    QTc Calculation (Bazett) 441 ms    P Axis 49 degrees    R Axis -7 degrees    T Axis -171 degrees       RADIOLOGY:  XR CHEST PORTABLE   Final Result   No acute process. EKG:  This EKG is signed and interpreted by me. Rate: 46  Rhythm: Sinus  Interpretation: EKG obtained demonstrated sinus bradycardia first-degree AV block, rate 46, left axis, , no acute ST segment changes. Comparison: changes compared to previous EKG      ------------------------- NURSING NOTES AND VITALS REVIEWED ---------------------------  Date / Time Roomed:  7/11/2022 12:11 AM  ED Bed Assignment:  16/16    The nursing notes within the ED encounter and vital signs as below have been reviewed. Patient Vitals for the past 24 hrs:   BP Temp Pulse Resp SpO2 Height Weight   07/11/22 0344 (!) 118/59 -- (!) 45 16 96 % -- --   07/11/22 0312 (!) 98/52 -- (!) 49 -- -- -- --   07/11/22 0150 (!) 100/52 -- (!) 47 -- -- -- --   07/11/22 0137 (!) 68/43 -- (!) 43 -- -- -- --   07/11/22 0046 (!) 93/52 -- 51 -- -- -- --   07/11/22 0033 (!) 80/49 97.3 °F (36.3 °C) (!) 47 16 94 % 5' 10\" (1.778 m) 134 lb (60.8 kg)       Oxygen Saturation Interpretation: Normal    ------------------------------------------ PROGRESS NOTES ------------------------------------------  Re-evaluation(s):  Time: 9287  Patients symptoms are improving  Repeat physical examination is improved    Counseling:  I have spoken with the patient and discussed todays results, in addition to providing specific details for the plan of care and counseling regarding the diagnosis and prognosis.   Their questions are answered at this time and they are agreeable with the plan of admission.    --------------------------------- ADDITIONAL PROVIDER NOTES ---------------------------------  Consultations:  Time: 3180. Spoke with [facility]. Discussed case. They will admit the patient. This patient's ED course included: a personal history and physicial examination, re-evaluation prior to disposition and multiple bedside re-evaluations    This patient has remained hemodynamically stable during their ED course. Diagnosis:  1. Hypotension, unspecified hypotension type        Disposition:  Patient's disposition: Admit to telemetry  Patient's condition is stable. Patient was seen and evaluated by myself and my attending Tiera Yuen DO. Assessment and Plan discussed with attending provider, please see attestation for final plan of care.      Saintclair Saddler, DO Scherry Craze, DO  Resident  07/11/22 [id number]

## 2022-07-11 NOTE — PROGRESS NOTES
Admission database completed to best of this RN's ability. Pharmacy tech to review medication list. Care plan and education initiated. Pt from [facility]. Per facility, pt able to ambulate with 1 assist and FWW. Incontinent of urine. HD pt M-W-F at [facility] through L AV fistula.

## 2022-07-11 NOTE — H&P
[address] [facility]  Resident History and Physical      Chief Complaint:    Chief Complaint   Patient presents with    Hypotension     given 2 percocet and 3 doses of midodrine        History of Present Illness:   Miguelina Chamorro is a 78 y.o. female patient of Gianna Heller MD with a pertinent PMHx of hypotension, HFrEF, ESRD on HD, CAD hx of NSTEMI, DM type 2, chronic vertebral osteomyelitis, dysphagia, who presented to the ED from facility with chief complaint of low BP, dizziness. Patient sleepy and has to be shaken constantly for questioning. Patient states that she has had dizziness for the past 2 days. Stated that she felt like the room is spinning and had a sensation of blacking out but denies falls. States she has been trying to drink more water but has been unable to. Has been feeling more weak and tired as of lately. Has had decreased intake of solid foods due to dysphagia. Uses walker to ambulate. At facility had received 2 Percocet of unknown mg and was noted to have low BP. Midodrine 5 mg x3 given in facility with no stable improvement. On arrival to the ED, was hypotensive with BP 80/49, bradycardic with HR 47. Received total 1250 cc IVF in ED in small boluses with resultant mild improvement in BP. Was placed on NS infusion at 75 cc/hr. BP seemed to decrease as patient would drift back into sleep. Lab work remarkable for Cr 4.8 and elevated LFTs, elevated Alk Phos 216, ALT 76, 75. CXR revealed no acute process. EKG revealed sinus bradycardia with 1st degree AV block which is a new finding and not seen on previous EKGs. Past Medical History:   Diagnosis Date    Acute infection of bone ([address]     infection of rt foot, resolved.     Acute osteomyelitis of phalanx of left hand ([address] 1/27/2022    Left third distal phalanx    Anemia of chronic disease     Arthritis     Breast cancer ([address]     right breast, 2008/ bladder, 2006- last chemotherapy \"years ago\"    CAD (coronary artery disease)     Carpal tunnel syndrome     bilat - for OR left hand 3-17-20     Chronic diastolic CHF (congestive heart failure) ([address] 09/23/2014 9/23/14- echocardiogram revealed moderate LV concentric hypertrophy, stage III diastolic dysfunction, mild tricuspid regurgitation    CKD (chronic kidney disease) stage 4, GFR 15-29 ml/min ([facility])     Diabetic retinopathy ([address]     Glaucoma     Hemodialysis patient ([address]     [facility]- Dr. Miguel Ángel Kelley - left arm fistula     Hyperkalemia, diminished renal excretion 11/9/2017    Hyperlipidemia     Hypertension     Hypoglycemia unawareness in type 1 diabetes mellitus ([address] 11/7/2017    Insulin dependent type 2 diabetes mellitus ([address]     Neuropathy     feet    Osteomyelitis due to secondary diabetes ([address]     rt great toe with amputation    Patient is Christianity 11/7/2017    Refusal of blood product     patient states she dose not take blood transfusion    Ventricular hypertrophy     Ventricular tachycardia ([address] 5/24/2021    Vitreous hemorrhage ([address]     left eye         Past Surgical History:   Procedure Laterality Date    AMPUTATION      right great toe    ANKLE SURGERY      correction on charcot joint of right ankle    BONE BIOPSY N/A 04/18/2022    BONE BIOPSY PERCUTANEOUS L1/L2 performed by Linda Simental MD at [address]  12/09/2021    Dr Priscila Almonte Left 03/17/2020    LEFT CARPAL TUNNEL RELEASE performed by Lorelei Benitez MD at [address]      bilateral    CHOLECYSTECTOMY      COLONOSCOPY      CORONARY ANGIOPLASTY  05/05/2022    Dr. Rona Collier - RCA angioplasty   Sai Silva Left 01/31/2018    upper arm/Dr. Trixie Vela    ECHO COMPL W DOP COLOR FLOW  02/14/2013         ECHO COMPLETE  09/17/2013         FINGER AMPUTATION Left 01/20/2022    AMPUTATION OF LEFT THIRD DIGIT, DISTAL PHALANX performed by Madhuri Jiménez MD Robert at [address]      right    OTHER SURGICAL HISTORY  09/27/2011    PPV, membranectomy, laser Right eye    OTHER SURGICAL HISTORY  insertion lumbar drain insertion    10/12/`14    OTHER SURGICAL HISTORY  10/22/2015    percutaneous lead placement for spinal cord stimulator    OTHER SURGICAL HISTORY  11/03/2015    Spinal; cord stimulator- turned off as of 3-10-20     PORT SURGERY N/A 6/27/2022    PORT REMOVAL performed by Bandar Chapman MD at [address] AV ANAST,UP ARM BASILIC VEIN TRANSPOSIT Left 05/15/2018    TRANSPOSITION STAGE II AV FISTULA - LEFT UPPER ARM performed by Karn Dakin, MD at [address] [facility] ANGIOACCESS AV FISTULA Left 09/25/2018    SUPERFICIALIZATION AV FISTULA - LEFT ARM performed by Karn Dakin, MD at [address] METH Left 04/10/2018    PARS PLANA VITRECTOMY 25 GAUGE RETINAL DETACHMENT REPAIR air fluid exchange, endolaser performed by David Steiner MD at [address] TRANSESOPHAGEAL ECHOCARDIOGRAM  11/11/2021    Dr. Alicia Garnica TRANSESOPHAGEAL ECHOCARDIOGRAM  04/19/2022        TUNNELED VENOUS CATHETER PLACEMENT  11/15/2017    VITRECTOMY Left 04/10/2018    PARS PLANA VITRECTOMY; RETINAL DETACHMENT REPAIR; GAS BUBBLE; LASER LEFT EYE       Medications Prior to Admission:    Prior to Admission medications    Medication Sig Start Date End Date Taking? Authorizing Provider   prochlorperazine (COMPAZINE) 10 MG tablet Take 0.5 tablets by mouth every 6 hours as needed (nausea) 6/28/22 7/8/22  Greer ABAD DO   acetaminophen (AMINOFEN) 325 MG tablet Take 2 tablets by mouth every 4 hours as needed for Pain 6/28/22 7/3/22  Humera Sutherland DO   vancomycin [facility]) infusion Infuse 1,000 mg intravenously three times a week for 28 days Compound per protocol.  6/27/22 7/25/22  Marga Finney APRN - CNP   doxycycline hyclate (VIBRAMYCIN) 100 MG capsule Take 1 capsule by mouth smoked. She has never used smokeless tobacco. She reports that she does not drink alcohol and does not use drugs. Family History:   family history includes Breast Cancer (age of onset: 61) in her maternal grandmother and mother; Heart Disease in her father; Hypertension in her mother; Prostate Cancer in her father. ROS:   Review of Systems   Constitutional: Positive for fatigue. Negative for appetite change. HENT: Negative for sore throat and trouble swallowing. Respiratory: Negative for cough and shortness of breath. Cardiovascular: Negative for chest pain, palpitations and leg swelling. Gastrointestinal: Negative for abdominal pain, constipation, diarrhea, nausea and vomiting. Genitourinary: Negative for difficulty urinating and dysuria. Musculoskeletal: Negative for arthralgias and myalgias. Skin: Negative for color change and rash. Neurological: Positive for dizziness, light-headedness and headaches. Psychiatric/Behavioral: Negative for confusion and decreased concentration. Physical Exam:    Vitals:  BP (!) 83/54   Pulse (!) 43   Temp 97.3 °F (36.3 °C)   Resp 18   Ht 5' 10\" (1.778 m)   Wt 134 lb (60.8 kg)   SpO2 96%   BMI 19.23 kg/m²     Physical Exam  Constitutional:       Appearance: Normal appearance. HENT:      Head: Normocephalic and atraumatic. Mouth/Throat:      Mouth: Mucous membranes are moist.   Eyes:      Extraocular Movements: Extraocular movements intact. Conjunctiva/sclera: Conjunctivae normal.   Cardiovascular:      Rate and Rhythm: Normal rate and regular rhythm. Pulses: Normal pulses. Heart sounds: Normal heart sounds. No murmur heard. Pulmonary:      Effort: Pulmonary effort is normal.      Breath sounds: No stridor. No wheezing. Abdominal:      General: Abdomen is flat. Bowel sounds are normal.      Palpations: Abdomen is soft. Tenderness: There is abdominal tenderness (lower abdomen, periumbilical).    Musculoskeletal: mg/dL    Alkaline Phosphatase 216 (H) 35 - 104 U/L    ALT 76 (H) 0 - 32 U/L    AST 75 (H) 0 - 31 U/L   Lactic Acid    Collection Time: 07/11/22 12:46 AM   Result Value Ref Range    Lactic Acid 1.0 0.5 - 2.2 mmol/L   EKG 12 Lead    Collection Time: 07/11/22  1:00 AM   Result Value Ref Range    Ventricular Rate 46 BPM    Atrial Rate 46 BPM    P-R Interval 220 ms    QRS Duration 94 ms    Q-T Interval 504 ms    QTc Calculation (Bazett) 441 ms    P Axis 49 degrees    R Axis -7 degrees    T Axis -171 degrees       XR CHEST PORTABLE   Final Result   No acute process. Assessment/Plan:      Active Hospital Problems    Diagnosis Date Noted    Atherosclerosis of native coronary artery of native heart without angina pectoris [I25.10]      Priority: High    Hypotension [I95.9] 07/11/2022     Priority: Medium    Vertebral osteomyelitis, chronic (HCC) [M46.20] 07/11/2022     Priority: Medium    ESRD (end stage renal disease) on [facility] [N18.6, Z99.2]     Mixed hyperlipidemia [E78.2]     Chronic diastolic CHF (congestive heart failure) ([facility]) [I50.32] 09/23/2014     Hypotension  PMHx of hypotension. Home regimen: Midodrine 5 mg QD. Received Midodrine 5 mg x3 and 1250 cc NS prior to admission without adequate stabilization of BP.   - Start NS 75 cc/hr  - Start Midodrine 5 mg TID  - Blood cx x2 ordered  - Procal ordered  - CRP, ESR ordered  - ICU management    HFrEF  Echo 4/2022 revealed EF 40-45%, mild mitral regurgitation.  - Caution with use of fluids given low EF  - Hold home Imdur 30 mg QD due to low BP  - Hold home Bumex 2 mg Sun-Tue-Th. 3 times a week  - Hold home Toprol 25 mg QD  - ICU management    ESRD on HD  HD schedule is MWF.  Is able to make some urine.   - Continue MWF schedule  - Continue home Lanthanum 1000 mg three times a week  - UA and Urine cx ordered  - Nephrology consulted, appreciate input- Discussed that due to bradycardia and hypotension as well as needing HD, patient would be better managed in ICU    CAD s/p Stent Placement  Recent cardiac catheterization 5/2022 that revealed in-stent stenosis of mid RCA with successful balloon angioplasty, patent stent in the LAD, totally occluded left circumflex artery. - Continue home ASA 81 mg QD  - Continue home Ticagrelor 90 mg BID  - ICU management    Dysphagia  - Recommend bedside swallow study    Chronic Vertebral Osteomyelitis  - Continue home Vancocin 1000 mg 3x week   - Continue home Doxycycline 100 mg BID  - ICU management    Hyperuricemia  - Continue home Allopurinol 100 mg QD    HLD  - Continue home Lipitor 80 mg QHS    DM Type 2  Home regimen: Lantus 5U QHS  - Continue home regimen Lantus 5U QHS  - Hypoglycemia protocol    DVT ppx: Heparin  GI ppx: Protonix  Code Status: Full      Case discussed with attending on call Dr. Arnold Lyman.     Mayte Schaefer MD   Family Medicine Resident PGY-2  7/11/2022

## 2022-07-11 NOTE — PROGRESS NOTES
Department of Internal Medicine  Nephrology Attending Progress Note        SUBJECTIVE:  Mrs Shawn Benjamin is a 78 yr old woman presents from nursing facility with hypotension and bradycardia given some midodrine with minimal improvement, K was 4.7 sent for dialysis , EKG showing widening QRS interval with inverted t waves in lateral leads    PMH   End-stage renal disease on dialysis Monday Wednesday Friday  Hypertension  Hyperlipidemia  Type 2 diabetes with neuropathy, retinopathy  Anemia of chronic kidney disease (patient Jehovah witness)  Secondary hyperparathyroid disease of renal origin  History of breast cancer rt side  History of coronary disease status post heart cath with balloon angioplasty 5/5/22  Carpal tunnel syndrome with bilateral release  History of osteomyelitis/discitis  L1-L2 on IV Vanco for 6 weeks now on suppressive doxycycline  History of spinal fusion of L3/L4/L5  History of glaucoma    Physical Exam:    Vitals:    07/11/22 1030   BP: (!) 74/46   Pulse: (!) 39   Resp:    Temp:    SpO2:        I/O last 24 hours:  Intake/Output none available    Weight: not done    General: Awake, alert, no acute distress  Neck: No JVD noted  Lungs: Diminished at the bases bilaterally. CV: Regular rate and rhythm but quite bradycardic.  No rub  Abd: Soft, nontender, nondistended.  Active bowel sounds  Skin: Warm and dry.  No rash on exposed extremities  Ext: 1+ RUE edema (h/o breast CA);  No BLE edema ; LUE AV fistula   Neuro: Awake, answers questions appropriately    DATA:    CBC with Differential:    Lab Results   Component Value Date/Time    WBC 5.0 07/11/2022 12:46 AM    RBC 4.26 07/11/2022 12:46 AM    HGB 12.1 07/11/2022 12:46 AM    HCT 38.8 07/11/2022 12:46 AM     07/11/2022 12:46 AM    MCV 91.1 07/11/2022 12:46 AM    MCH 28.4 07/11/2022 12:46 AM    MCHC 31.2 07/11/2022 12:46 AM    RDW 17.3 07/11/2022 12:46 AM    NRBC 0.0 02/18/2017 04:00 AM    SEGSPCT 70 03/12/2014 10:53 AM    BANDSPCT 1 02/18/2015 10:35 AM LYMPHOPCT 25.5 07/11/2022 12:46 AM    PROMYELOPCT 1.8 02/18/2017 04:00 AM    MONOPCT 11.6 07/11/2022 12:46 AM    MYELOPCT 0.9 10/24/2017 10:45 AM    BASOPCT 0.6 07/11/2022 12:46 AM    MONOSABS 0.58 07/11/2022 12:46 AM    LYMPHSABS 1.28 07/11/2022 12:46 AM    EOSABS 0.12 07/11/2022 12:46 AM    BASOSABS 0.03 07/11/2022 12:46 AM     CMP:    Lab Results   Component Value Date/Time     07/11/2022 12:46 AM    K 4.7 07/11/2022 12:46 AM    CL 96 07/11/2022 12:46 AM    CO2 26 07/11/2022 12:46 AM    BUN 45 07/11/2022 12:46 AM    CREATININE 4.8 07/11/2022 12:46 AM    GFRAA 11 07/11/2022 12:46 AM    LABGLOM 11 07/11/2022 12:46 AM    GLUCOSE 171 07/11/2022 12:46 AM    GLUCOSE 46 04/02/2012 11:00 AM    PROT 6.2 07/11/2022 12:46 AM    LABALBU 3.2 07/11/2022 12:46 AM    LABALBU 3.8 04/02/2012 11:00 AM    CALCIUM 9.8 07/11/2022 12:46 AM    BILITOT 0.3 07/11/2022 12:46 AM    ALKPHOS 216 07/11/2022 12:46 AM    AST 75 07/11/2022 12:46 AM    ALT 76 07/11/2022 12:46 AM            allopurinol  100 mg Oral Daily    atorvastatin  80 mg Oral Nightly    doxycycline hyclate  100 mg Oral 2 times per day    gabapentin  200 mg Oral TID    midodrine  5 mg Oral TID WC    ticagrelor  90 mg Oral BID    vancomycin  1,000 mg IntraVENous Once per day on Mon Wed Fri    sodium chloride flush  5-40 mL IntraVENous 2 times per day    heparin (porcine)  5,000 Units SubCUTAneous 3 times per day    aspirin  81 mg Oral Daily    pantoprazole (PROTONIX) 40 mg injection  40 mg IntraVENous Daily    insulin lispro  0-6 Units SubCUTAneous TID     insulin lispro  0-3 Units SubCUTAneous Nightly      sodium chloride 75 mL/hr at 07/11/22 0612    sodium chloride       sodium chloride flush, sodium chloride, ondansetron **OR** ondansetron, polyethylene glycol, acetaminophen **OR** acetaminophen    IMPRESSION/RECOMMENDATIONS:      End-stage renal disease presently on  Dialysis, normally on  Monday Wednesday Friday schedule in view of her bradycardia

## 2022-07-11 NOTE — PROCEDURES
Informed consent from brother. US localization. Time out performed. Sterile prep and drape. 1% lidocaine sc. Vein easily cannulated and guidewire passed. Incision made and vein dilated. Catheter placed and all ports flushed after returning venous blood. Line sutured. Biopatch and sterile dressing applied. No immediate complications. EBL under 2 cc.

## 2022-07-11 NOTE — CONSULTS
Inpatient Cardiology Consultation      Reason for Consult: EKG changes bradycardia and hypotension    Consulting Physician: Dr. Darnell Varela    Requesting Physician:  Dr. Giorgi Huffman    Date of Consultation: 7/11/2022    HISTORY OF PRESENT ILLNESS:     This 79-year-old female is known to [facility] cardiology and is followed by Dr. Sadaf Ward. She has a history of coronary artery disease and a PCI was done in May 2022. She also has a history of a mitral valve mass. She has had several recent hospitalizations see notes below, but she was last evaluated by  on June 16 for elevated troponin during an admission for pneumonia. She has chronic myocardial injury and no further cardiac testing was done. She was to follow-up with CT surgery as an outpatient. She was discharged on June 27. She presented to the emergency room today with fatigue, dizziness and hypotension after receiving 2 Percocet. She was given 3 doses of midodrine. Blood pressure on admission was 80/49 with a heart rate of 47    Chest x-ray showed no acute process    EKG on admission was sinus bradycardia with first-degree AV block and a septal infarct and T wave inversion in the lateral leads    Potassium was 4.7 with a BUN of 45 and a creatinine of 4.8, end-stage renal disease, magnesium 1.9, troponin 69, mildly elevated transaminases with an ALT of 76 and an AST of 75, WBCs 5 and H&H 12.1 and 38.8      She was treated with 2 fluid boluses and given ProAmatine. Midodrine was increased to 3 times a day. Bumex Toprol and Imdur were placed on hold    Past Medical History:    1. Jehovah Witness  2. 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 3. HTN  4. HLD  5. Anemia  6. Bladder carcinoma s/p chemotherapy  7. Right breast cancer with mastectomy  2005 followed by radiation and chemotherapy, Arimidex.  Chronic  lymphedema  LIZ Matilda [place] states became a therapy because of multiple back issues and weakened her bones,  port in her right upper disease. 25. Surgical history: Correction of Charcot joint right, carpal tunnel release 3/2020, cholecystectomy, dialysis fistula creation, mastectomy, laser treatment of eyes, spinal cord stimulator, surgery for retinal detachment.  Spinal cord decompression L2.  26. 11/2017 Insertion of right internal jugular vein tunneled hemodialysis catheter fluoroscopy Removal of right femoral temporary hemodialysis catheter  27. 1/31/2018 Creation of L brachiocephalic AVF  28. 1/18/0242 [id number] of left upper extremity brachiobasilic  fistula, stage II  29. 3/99/4307 [id number] of L basilic AVF  30. 90/72/1893  TTE: EF 60-65%. Moderate CLVH. No VHD. 31. Ambulates with walker  32. Completed COVID Vaccine  35. 2D echocardiogram [facility] December 8, 2020 EF 60 to 90% and diastolic dysfunction with moderate concentric left ventricular hypertrophy and moderate MAC and aortic valve sclerosis  34. Hospital admission May 2021 with a reported shockable rhythm by AED and received 1 defibrillation and had reported ventricular tachycardia in the emergency room and was started on a lidocaine drip, elevated troponin but no acute ischemic EKG changes, started on aspirin and Lipitor resumed and beta-blockers and nitrates initiated  35. Lexiscan stress test May 19, 2021, abnormal with a large fixed defect in the apical and septal wall small fixed defect in the inferior apical wall partially reversible defect in the small area of the anterior lateral wall and EF 25% with apical septal akinesis and moderate global hypokinesis and dilated left ventricle during stress and rest and was a high risk study  36. Left heart cath May 2021 she underwent stenting to the LAD and RCA. CONCLUSION: Coronary artery disease: Left main:  20% eccentric mid vessel narrowing. LAD:  50% proximal vessel narrowing and 95% mid vessel stenosis. Trivial diagonal branch with 90% stenosis.  LCX:  Occluded after a small caliber first marginal branch which is a chronic total occlusion.  The second larger marginal branch filled late. RCA:  Large, dominant vessel with 90% heavily calcified mid vessel stenosis.  50% disease at the level of the crux and 70% ostial stenosis of the posterior descending artery branch. Markedly elevated left ventricular end-diastolic pressure. Successful balloon angioplasty with deployment of drug-eluting coronary stent to the mid LAD with very good results. Successful balloon angioplasty with deployment of drug-eluting coronary stent to the mid RCA with poststent deployment dilatation with a larger high-pressure noncompliant balloon with good results. 40. 2D echocardiogram on May 21, 2021 left ventricle is dilated There is apical, septal and basal inferior wall severe hypokinesis to akinesis Ejection fraction is visually estimated at 30+/-5%. There is doppler evidence of stage III diastolic dysfunction. Normal right ventricular size. Right ventricle global systolic function is mildly reduced . The left atrium is moderately dilated. Severe posterior mitral annular calcification. Focal calcification mitral valve leaflets. Chordal calcification is present. A mobile well defined 1.0 cm by 0.5 cm echo density is noted on the ventricular aspect of the mitral valve suggestive of a fibroelastoma: clinical correlation is needed. No mitral stenosis. Mild mitral regurgitation. Mild to moderate tricuspid regurgitation. Moderate pulmonary hypertension. Aortic root is sclerotic and calcified.   38. 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  KIARA November 11, 2021 for mitral leaflet mass Normal LV function, normal RV function, no hemodynamically significant valve disease(mobile posterior leaflet echodensity with leaflet restriction and mild MR), no evidence of vegetations, no left atrial or left atrial appendage thrombus (no evidence of spontaneous echo contrast), no intraatrial shunting on bubble study, no significant atherosclerosis of the aorta  40. Left heart cath December 9, 2021 ANGIOGRAPHIC FINDINGS: The left main coronary artery arises normally from the left sinus of Valsalva.  It is a large vessel without any disease. Alessia Langley is mild distal calcification.  It bifurcates into left anterior descending artery and left circumflex artery. The left anterior descending artery has moderate-to-severe proximal and ostial calcifications.  There is 20% ostial disease.  The rest of the vessel is mildly diffusely diseased.  There is patent stent in the mid segment.  The LAD gives off a few small diagonal branches.  The second one is totally occluded.  The rest are mildly diseased. The left circumflex artery is a large vessel that has total occlusion after the takeoff of the second OM branch.  The first OM branch is a mid size vessel that is mildly diseased.  The second one is the small that has chronic total occlusion with a faint left-to-left filling collaterals. The right coronary artery has inferior takeoff.  There are stents in the mid and proximal segments.  There is 80% in-stent stenosis in the mid segment.  The rest of the vessel has luminal irregularities.  It gives off good size right PDA and good size right PLV that has no significant CAD noted. Alessia Langley is significant ostial RCA also noted as evident by damping of pressure on engagement and lack of contrast reflux. IMPRESSION: Mild disease involving left anterior descending artery with a patent stent in the mid segment. Totally occluded left circumflex artery. Total occlusion of the second OM branch with left-to-left collaterals. Significant ostial RCA and significant mid RCA disease as mentioned above. RECOMMENDATIONS:  Continue current treatment as per CT Surgery.  Per CTS, she was scheduled for a CABG x2 With a saphenous vein graft to the RCA and a saphenous vein graft to the circumflex and excision of mitral valve mass  41.  January 3, 2022 osteomyelitis left middle finger left hand, January 20, 2022 amputation left third digit distal phalanx for osteomyelitis (open heart postponed) patient stopped taking aspirin at that time but was only of Brilinta a day or 2  42. Chronic hemodialysis on Monday Wednesday and Friday/renal osteodystrophy  43. Anemia  44. Hospitalization April 15 through April 22 for osteomyelitis of the lumbar spine no abdominal  45. KIARA 4/19/2022 ([place]):  LV systolic function mildly reduced.  Ejection fraction is visually estimated at 40-45%.  Moderate mitral annular calcification.   Valvular and subvalvular calcification.  Mild mitral regurgitation.  No definitive evidence of papillary fibroelastoma.   46. Non-ST elevation MI cath / PCI 5/5/22 ( Dr. Jamar Morales ): .  In-stent stenosis of mid RCA with successful balloon  angioplasty (pre-PCI LATOSHA-3 flow, post-PCI LATOSHA-3 flow, pre-PCI stenosis 90%, post-PCI stenosis less than 10%). 2.  Patent stent in the LAD. 3.  Totally occluded left circumflex artery. Discharged to SNF for 6 weeks of antibiotics  47. Hospital admission June 16, 2022 with pneumonia, altered mental status and elevated troponins but no acute EKG changes and troponin elevation not consistent with acute coronary syndrome, discharged June 27  48. Chronic myocardial injury and troponin elevation and other troponins have been as high as 1388 and his lowest 185  49. Dysphagia  50. Mediport with skin erosion June 27, 2022, port with catheter removed intact and overlying skin ischemic   51. Gait disturbance and ambulates with a walker        Medications Prior to admit:  Prior to Admission medications    Medication Sig Start Date End Date Taking?  Authorizing Provider   acetaminophen (TYLENOL) 325 MG tablet Take 650 mg by mouth every 4 hours as needed for Pain   Yes Historical Provider, MD   Acidophilus Lactobacillus CAPS Take 1 capsule by mouth every morning   Yes Historical Provider, MD   aspirin 81 MG EC tablet Take 81 mg by mouth every morning   Yes Historical Provider, MD   meclizine (ANTIVERT) 12.5 MG tablet Take 12.5 mg by mouth every 8 hours as needed for Dizziness   Yes Historical Provider, MD   mirtazapine (REMERON) 7.5 MG tablet Take 7.5 mg by mouth nightly   Yes Historical Provider, MD   oxyCODONE-acetaminophen (PERCOCET) 5-325 MG per tablet Take 2 tablets by mouth every 8 hours as needed for Pain. Yes Historical Provider, MD   prochlorperazine (COMPAZINE) 5 MG tablet Take 5 mg by mouth every 6 hours as needed (NAUSEA AND VOMITING)   Yes Historical Provider, MD   sevelamer (RENVELA) 800 MG tablet Take 1 tablet by mouth 3 times daily (with meals)   Yes Historical Provider, MD   ZINC OXIDE, TOPICAL, (SECURA PROTECTIVE) 10 % CREA Apply 1 Dose topically 2 times daily -BUTTOCKS-   Yes Historical Provider, MD   Vancomycin HCl in Dextrose (VANCOCIN HCL IV) Infuse 1,000 mg intravenously every 72 hours **MON-WED-FRI**  -SEND TO DIALYSIS W/ PT- 6/29/22 7/27/22 Yes Historical Provider, MD   doxycycline hyclate (VIBRAMYCIN) 100 MG capsule Take 1 capsule by mouth every 12 hours 6/22/22 8/3/22  Marga Finney, APRN - CNP   midodrine (PROAMATINE) 5 MG tablet Take 5 mg by mouth daily as needed (SBP <100. COURTNEY REPEAT X 1 DURING DIALYSIS)     Historical Provider, MD   gabapentin (NEURONTIN) 100 MG capsule Take 2 capsules by mouth 3 times daily for 180 days.  3/24/22 9/20/22  Samuel Salgado., MD   atorvastatin (LIPITOR) 80 MG tablet Take 80 mg by mouth nightly    Historical Provider, MD   bumetanide (BUMEX) 2 MG tablet Take 2 mg by mouth three times a week **SUN-TUE-THURS**    Historical Provider, MD   isosorbide mononitrate (IMDUR) 30 MG extended release tablet Take 30 mg by mouth daily    Historical Provider, MD   insulin glargine (LANTUS) 100 UNIT/ML injection vial Inject 5 Units into the skin nightly     Historical Provider, MD   lidocaine-prilocaine (EMLA) 2.5-2.5 % cream Apply 1 Dose topically three times a week **MON-WED-FRI**  TO [facility]    Historical Provider, MD   metoprolol succinate (TOPROL XL) 25 MG extended release tablet Take 1 tablet by mouth daily 11/24/21   Marylen Mortimer, MD   lanthanum (FOSRENOL) 1000 MG chewable tablet Take 1,000 mg by mouth 3 times daily (with meals)  8/9/21   Historical Provider, MD   allopurinol (ZYLOPRIM) 100 MG tablet Take 1 tablet by mouth daily 9/7/21   Marylen Mortimer, MD   ticagrelor (BRILINTA) 90 MG TABS tablet Take 1 tablet by mouth 2 times daily 9/7/21   Marylen Mortimer, MD   B Complex-C-Folic Acid (BONI CAPS) 1 MG CAPS Take 1 mg by mouth nightly     Historical Provider, MD   omeprazole (PRILOSEC) 20 MG delayed release capsule Take 20 mg by mouth daily as needed (FOR GI UPSET)     Historical Provider, MD MCHUGH 0.01 % SOLN ophthalmic drops Place 1 drop into the right eye nightly  6/9/20   Historical Provider, MD   COMBIGAN 0.2-0.5 % ophthalmic solution Place 1 drop into the right eye every 12 hours  8/1/19   Historical Provider, MD       Current Medications:    Current Facility-Administered Medications: 0.9 % sodium chloride infusion, , IntraVENous, Continuous  allopurinol (ZYLOPRIM) tablet 100 mg, 100 mg, Oral, Daily  atorvastatin (LIPITOR) tablet 80 mg, 80 mg, Oral, Nightly  doxycycline hyclate (VIBRAMYCIN) capsule 100 mg, 100 mg, Oral, 2 times per day  gabapentin (NEURONTIN) capsule 200 mg, 200 mg, Oral, TID  midodrine (PROAMATINE) tablet 5 mg, 5 mg, Oral, TID WC  ticagrelor (BRILINTA) tablet 90 mg, 90 mg, Oral, BID  vancomycin (VANCOCIN) 1,000 mg, 1,000 mg, IntraVENous, Once per day on Mon Wed Fri  sodium chloride flush 0.9 % injection 5-40 mL, 5-40 mL, IntraVENous, 2 times per day  sodium chloride flush 0.9 % injection 5-40 mL, 5-40 mL, IntraVENous, PRN  0.9 % sodium chloride infusion, , IntraVENous, PRN  heparin (porcine) injection 5,000 Units, 5,000 Units, SubCUTAneous, 3 times per day  ondansetron (ZOFRAN-ODT) disintegrating tablet 4 mg, 4 mg, Oral, Q8H PRN **OR** ondansetron [facility]) injection 4 mg, 4 mg, IntraVENous, Q6H PRN  polyethylene glycol (GLYCOLAX) packet 17 g, 17 g, Oral, Daily PRN  acetaminophen (TYLENOL) tablet 650 mg, 650 mg, Oral, Q6H PRN **OR** acetaminophen (TYLENOL) suppository 650 mg, 650 mg, Rectal, Q6H PRN  aspirin EC tablet 81 mg, 81 mg, Oral, Daily  pantoprazole (PROTONIX) 40 mg in sodium chloride (PF) 10 mL injection, 40 mg, IntraVENous, Daily  insulin lispro (HUMALOG) injection vial 0-6 Units, 0-6 Units, SubCUTAneous, TID WC  insulin lispro (HUMALOG) injection vial 0-3 Units, 0-3 Units, SubCUTAneous, Nightly  lanthanum (FOSRENOL) chewable tablet 1,000 mg, 1,000 mg, Oral, TID WC  insulin glargine (LANTUS) injection vial 5 Units, 5 Units, SubCUTAneous, Nightly  glucose chewable tablet 16 g, 4 tablet, Oral, PRN  dextrose bolus 10% 125 mL, 125 mL, IntraVENous, PRN **OR** dextrose bolus 10% 250 mL, 250 mL, IntraVENous, PRN  glucagon (rDNA) injection 1 mg, 1 mg, IntraMUSCular, PRN  dextrose 5 % solution, 100 mL/hr, IntraVENous, PRN    Allergies:  Furosemide    Social History: Non-smoker denies alcohol      Family History:   Family History   Problem Relation Age of Onset    Breast Cancer Mother 61    Hypertension Mother     Heart Disease Father     Prostate Cancer Father     Breast Cancer Maternal Grandmother 60       REVIEW OF SYSTEMS:     · Constitutional: Denies fatigue, fevers, chills or night sweats  · Eyes: Denies visual changes or drainage  · ENT: Denies headaches or hearing loss. No mouth sores or sore throat. No epistaxis   · Cardiovascular: Denies chest pain, pressure or palpitations. No lower extremity swelling. · Respiratory: Denies FRENCH, cough, orthopnea or PND. No hemoptysis   · Gastrointestinal: Denies hematemesis or anorexia. No hematochezia or melena    · Genitourinary: Denies urgency, dysuria or hematuria.   · Musculoskeletal: Denies gait disturbance, weakness or joint complaints  · Integumentary: Denies rash, hives or pruritis   · Neurological: Denies dizziness, headaches or seizures. No numbness or tingling  · Psychiatric: Denies anxiety or depression. · Endocrine: Denies temperature intolerance. No recent weight change. .  · Hematologic/Lymphatic: Denies abnormal bruising or bleeding. No swollen lymph nodes    PHYSICAL EXAM:   BP (!) 163/74   Pulse (!) 44   Temp (!) 95.9 °F (35.5 °C) (Temporal)   Resp 20   Ht 5' 10\" (1.778 m)   Wt 145 lb 1 oz (65.8 kg)   SpO2 99%   BMI 20.81 kg/m²   CONST:  Well developed, well nourished who appears of stated age. Awake, alert and cooperative. No apparent distress. HEENT:   Head- Normocephalic, atraumatic   Eyes- Conjunctivae pink, anicteric  Throat- Oral mucosa pink and moist  Neck-  No stridor, trachea midline, no jugular venous distention. No carotid bruit. CHEST: Chest symmetrical and non-tender to palpation. No accessory muscle use or intercostal retractions  RESPIRATORY: Lung sounds - clear throughout fields   CARDIOVASCULAR:     Heart Inspection- shows no noted pulsations  Heart Palpation- no heaves or thrills; PMI is non-displaced   Heart Ausculation- Regular rate and rhythm, no murmur. No s3, s4 or rub   PV: No lower extremity edema. No varicosities. Pedal pulses palpable, no clubbing or cyanosis, aVF left upper extremity  ABDOMEN: Soft, non-tender to light palpation. Bowel sounds present. No palpable masses no organomegaly; no abdominal bruit  MS: Good muscle strength and tone. No atrophy or abnormal movements. : Deferred  SKIN: Warm and dry no statis dermatitis or ulcers   NEURO / PSYCH: Oriented to person, place and time. Speech clear and appropriate. Follows all commands.  Pleasant affect     DATA:    ECG / Tele strips sinus rhythm  Diagnostic:      Intake/Output Summary (Last 24 hours) at 7/11/2022 1230  Last data filed at 7/11/2022 1103  Gross per 24 hour   Intake 300 ml   Output 400 ml   Net -100 ml       Labs:   CBC:   Recent Labs     07/11/22  0046   WBC 5.0   HGB 12.1   HCT 38.8   PLT 147     BMP:   Recent Labs     07/11/22  0046      K 4.7   CO2 26   BUN 45*   CREATININE 4.8*   LABGLOM 11   CALCIUM 9.8     Mag:   Recent Labs     07/11/22  1048   MG 1.9     Phos: No results for input(s): PHOS in the last 72 hours. TFT:   Lab Results   Component Value Date    TSH 3.120 05/04/2022    H3DMGFQ 5.0 12/24/2012    T4FREE 1.58 05/19/2021      HgA1c:   Lab Results   Component Value Date    LABA1C 5.1 05/04/2022     No results found for: EAG  proBNP: No results for input(s): PROBNP in the last 72 hours. PT/INR: No results for input(s): PROTIME, INR in the last 72 hours. APTT:No results for input(s): APTT in the last 72 hours. CARDIAC ENZYMES:  Recent Labs     07/11/22  1048   CKTOTAL 25   TROPHS 69*     FASTING LIPID PANEL:  Lab Results   Component Value Date/Time    CHOL 103 05/04/2022 07:10 AM    HDL 42 05/04/2022 07:10 AM    LDLCALC 48 05/04/2022 07:10 AM    TRIG 66 05/04/2022 07:10 AM     LIVER PROFILE:  Recent Labs     07/11/22  0046   AST 75*   ALT 76*   LABALBU 3.2*     Impressions discussed with Dr. Dianne Mcdowell    1. Bradycardia and hypotension (with a history of hypertension) after taking 2 Percocet. Hypotension resolved after fluid boluses, increasing midodrine  2. Bradycardia, currently asymptomatic and beta-blockers on hold. TSH in May of this year was normal  3. Elevated troponin with no chest pain, no acute coronary syndrome and a history of elevated troponins,, nonspecific T wave changes laterally which were present prior EKGs  4. Ischemic cardiomyopathy coronary artery disease with PCI/drug-eluting stents to the LAD and RCA in May 2021 following a non-ST elevation MI, chronic circumflex occlusion known by left heart cath in May 2021 with a trivial diagonal branch 90%. And a non-ST elevation MI May 5, 2022 with a patent stent to the LAD and status post in-stent stenosis of the mid RCA with successful balloon angioplasty  5.  KIARA echocardiogram in April 2022 with an EF of 40 to 45% and moderate MAC and valvular calcification and mild mitral regurgitation and no evidence of a papillary fibroelastoma (KIARA in November 2021 showed a mobile posterior leaflet echodensity attached to the ventricular side of the posterior leaflet 1 x 1.2 cm with restrictive excursion and mild MR mild TR)  6. End-stage renal disease on dialysis  7. Mitral valve mass  8. Insulin-dependent diabetic with neuropathy retinopathy and nephropathy  9. Chronic anemia  10. Oriental orthodox  11. Hyperlipidemia  12. Bladder cancer and status post chemotherapy osteomyelitis of the lumbar spine With a back fusion in April 2022 and a spinal cord stimulator for intractable back pain completed IV antibiotics  13. Right breast cancer status postmastectomy  14.  history of decompressive lumbar laminectomy, spinal cord stimulator  15. Status post amputation of left hand distal middle finger for osteomyelitis  16. History of obesity      Plans  1. Continue to hold beta-blockers  2. There is no indication for temporary pacemaker at this time  3. Agree with increasing midodrine  4. Volume management per nephrology continue Lipitor and aspirin  5.  Unable to use ACE inhibitors due to end-stage renal disease          Electronically signed by GOYO Weir CNP on 7/11/2022 at 12:30 PM

## 2022-07-12 LAB
ADENOVIRUS BY PCR: NOT DETECTED
ALBUMIN SERPL-MCNC: 3.2 G/DL (ref 3.5–5.2)
ALP BLD-CCNC: 202 U/L (ref 35–104)
ALT SERPL-CCNC: 97 U/L (ref 0–32)
ANION GAP SERPL CALCULATED.3IONS-SCNC: 15 MMOL/L (ref 7–16)
AST SERPL-CCNC: 103 U/L (ref 0–31)
BASOPHILS ABSOLUTE: 0.01 E9/L (ref 0–0.2)
BASOPHILS RELATIVE PERCENT: 0.2 % (ref 0–2)
BILIRUB SERPL-MCNC: 0.3 MG/DL (ref 0–1.2)
BORDETELLA PARAPERTUSSIS BY PCR: NOT DETECTED
BORDETELLA PERTUSSIS BY PCR: NOT DETECTED
BUN BLDV-MCNC: 41 MG/DL (ref 6–23)
CALCIUM SERPL-MCNC: 9.3 MG/DL (ref 8.6–10.2)
CHLAMYDOPHILIA PNEUMONIAE BY PCR: NOT DETECTED
CHLORIDE BLD-SCNC: 101 MMOL/L (ref 98–107)
CO2: 21 MMOL/L (ref 22–29)
CORONAVIRUS 229E BY PCR: NOT DETECTED
CORONAVIRUS HKU1 BY PCR: NOT DETECTED
CORONAVIRUS NL63 BY PCR: NOT DETECTED
CORONAVIRUS OC43 BY PCR: NOT DETECTED
CREAT SERPL-MCNC: 4.2 MG/DL (ref 0.5–1)
EOSINOPHILS ABSOLUTE: 0.01 E9/L (ref 0.05–0.5)
EOSINOPHILS RELATIVE PERCENT: 0.2 % (ref 0–6)
GFR AFRICAN AMERICAN: 13
GFR NON-AFRICAN AMERICAN: 13 ML/MIN/1.73
GLUCOSE BLD-MCNC: 143 MG/DL (ref 74–99)
HCT VFR BLD CALC: 41.5 % (ref 34–48)
HEMOGLOBIN: 12.9 G/DL (ref 11.5–15.5)
HUMAN METAPNEUMOVIRUS BY PCR: NOT DETECTED
HUMAN RHINOVIRUS/ENTEROVIRUS BY PCR: NOT DETECTED
IMMATURE GRANULOCYTES #: 0.02 E9/L
IMMATURE GRANULOCYTES %: 0.4 % (ref 0–5)
INFLUENZA A BY PCR: NOT DETECTED
INFLUENZA B BY PCR: NOT DETECTED
LYMPHOCYTES ABSOLUTE: 0.68 E9/L (ref 1.5–4)
LYMPHOCYTES RELATIVE PERCENT: 12.3 % (ref 20–42)
MAGNESIUM: 2.1 MG/DL (ref 1.6–2.6)
MCH RBC QN AUTO: 27.9 PG (ref 26–35)
MCHC RBC AUTO-ENTMCNC: 31.1 % (ref 32–34.5)
MCV RBC AUTO: 89.8 FL (ref 80–99.9)
METER GLUCOSE: 109 MG/DL (ref 74–99)
METER GLUCOSE: 117 MG/DL (ref 74–99)
METER GLUCOSE: 151 MG/DL (ref 74–99)
METER GLUCOSE: 90 MG/DL (ref 74–99)
METER GLUCOSE: 92 MG/DL (ref 74–99)
MONOCYTES ABSOLUTE: 0.19 E9/L (ref 0.1–0.95)
MONOCYTES RELATIVE PERCENT: 3.4 % (ref 2–12)
MYCOPLASMA PNEUMONIAE BY PCR: NOT DETECTED
NEUTROPHILS ABSOLUTE: 4.6 E9/L (ref 1.8–7.3)
NEUTROPHILS RELATIVE PERCENT: 83.5 % (ref 43–80)
PARAINFLUENZA VIRUS 1 BY PCR: NOT DETECTED
PARAINFLUENZA VIRUS 2 BY PCR: NOT DETECTED
PARAINFLUENZA VIRUS 3 BY PCR: NOT DETECTED
PARAINFLUENZA VIRUS 4 BY PCR: NOT DETECTED
PDW BLD-RTO: 17.2 FL (ref 11.5–15)
PHOSPHORUS: 5.2 MG/DL (ref 2.5–4.5)
PLATELET # BLD: 127 E9/L (ref 130–450)
PMV BLD AUTO: ABNORMAL FL (ref 7–12)
POTASSIUM SERPL-SCNC: 5.6 MMOL/L (ref 3.5–5)
RBC # BLD: 4.62 E12/L (ref 3.5–5.5)
RESPIRATORY SYNCYTIAL VIRUS BY PCR: NOT DETECTED
SARS-COV-2, PCR: NOT DETECTED
SODIUM BLD-SCNC: 137 MMOL/L (ref 132–146)
TOTAL PROTEIN: 6 G/DL (ref 6.4–8.3)
VANCOMYCIN RANDOM: 15.5 MCG/ML (ref 5–40)
WBC # BLD: 5.5 E9/L (ref 4.5–11.5)

## 2022-07-12 PROCEDURE — 82962 GLUCOSE BLOOD TEST: CPT

## 2022-07-12 PROCEDURE — 85025 COMPLETE CBC W/AUTO DIFF WBC: CPT

## 2022-07-12 PROCEDURE — 99232 SBSQ HOSP IP/OBS MODERATE 35: CPT | Performed by: FAMILY MEDICINE

## 2022-07-12 PROCEDURE — 6370000000 HC RX 637 (ALT 250 FOR IP)

## 2022-07-12 PROCEDURE — 2000000000 HC ICU R&B

## 2022-07-12 PROCEDURE — A4216 STERILE WATER/SALINE, 10 ML: HCPCS

## 2022-07-12 PROCEDURE — 6370000000 HC RX 637 (ALT 250 FOR IP): Performed by: STUDENT IN AN ORGANIZED HEALTH CARE EDUCATION/TRAINING PROGRAM

## 2022-07-12 PROCEDURE — 99233 SBSQ HOSP IP/OBS HIGH 50: CPT | Performed by: INTERNAL MEDICINE

## 2022-07-12 PROCEDURE — 6370000000 HC RX 637 (ALT 250 FOR IP): Performed by: FAMILY MEDICINE

## 2022-07-12 PROCEDURE — 84100 ASSAY OF PHOSPHORUS: CPT

## 2022-07-12 PROCEDURE — 2580000003 HC RX 258

## 2022-07-12 PROCEDURE — 6370000000 HC RX 637 (ALT 250 FOR IP): Performed by: INTERNAL MEDICINE

## 2022-07-12 PROCEDURE — 0202U NFCT DS 22 TRGT SARS-COV-2: CPT

## 2022-07-12 PROCEDURE — 6360000002 HC RX W HCPCS

## 2022-07-12 PROCEDURE — 80202 ASSAY OF VANCOMYCIN: CPT

## 2022-07-12 PROCEDURE — 83735 ASSAY OF MAGNESIUM: CPT

## 2022-07-12 PROCEDURE — 80053 COMPREHEN METABOLIC PANEL: CPT

## 2022-07-12 PROCEDURE — 36592 COLLECT BLOOD FROM PICC: CPT

## 2022-07-12 PROCEDURE — C9113 INJ PANTOPRAZOLE SODIUM, VIA: HCPCS

## 2022-07-12 PROCEDURE — 36415 COLL VENOUS BLD VENIPUNCTURE: CPT

## 2022-07-12 RX ORDER — TIMOLOL MALEATE 5 MG/ML
1 SOLUTION/ DROPS OPHTHALMIC 2 TIMES DAILY
Status: DISCONTINUED | OUTPATIENT
Start: 2022-07-12 | End: 2022-07-12

## 2022-07-12 RX ORDER — LATANOPROST 50 UG/ML
1 SOLUTION/ DROPS OPHTHALMIC DAILY
Status: DISCONTINUED | OUTPATIENT
Start: 2022-07-12 | End: 2022-07-22 | Stop reason: HOSPADM

## 2022-07-12 RX ORDER — BRIMONIDINE TARTRATE 2 MG/ML
1 SOLUTION/ DROPS OPHTHALMIC 2 TIMES DAILY
Status: DISCONTINUED | OUTPATIENT
Start: 2022-07-12 | End: 2022-07-22 | Stop reason: HOSPADM

## 2022-07-12 RX ORDER — BRIMONIDINE TARTRATE, TIMOLOL MALEATE 2; 5 MG/ML; MG/ML
1 SOLUTION/ DROPS OPHTHALMIC EVERY 12 HOURS
Status: DISCONTINUED | OUTPATIENT
Start: 2022-07-12 | End: 2022-07-12 | Stop reason: CLARIF

## 2022-07-12 RX ADMIN — DOXYCYCLINE HYCLATE 100 MG: 100 CAPSULE ORAL at 08:40

## 2022-07-12 RX ADMIN — MIDODRINE HYDROCHLORIDE 10 MG: 5 TABLET ORAL at 17:15

## 2022-07-12 RX ADMIN — BRIMONIDINE TARTRATE 1 DROP: 2 SOLUTION OPHTHALMIC at 20:53

## 2022-07-12 RX ADMIN — LATANOPROST 1 DROP: 50 SOLUTION OPHTHALMIC at 13:50

## 2022-07-12 RX ADMIN — BRIMONIDINE TARTRATE 1 DROP: 2 SOLUTION OPHTHALMIC at 12:52

## 2022-07-12 RX ADMIN — GABAPENTIN 200 MG: 100 CAPSULE ORAL at 20:51

## 2022-07-12 RX ADMIN — MIDODRINE HYDROCHLORIDE 10 MG: 5 TABLET ORAL at 08:40

## 2022-07-12 RX ADMIN — LANTHANUM CARBONATE 1000 MG: 500 TABLET, CHEWABLE ORAL at 12:55

## 2022-07-12 RX ADMIN — ASPIRIN 81 MG: 81 TABLET, COATED ORAL at 08:41

## 2022-07-12 RX ADMIN — LANTHANUM CARBONATE 1000 MG: 500 TABLET, CHEWABLE ORAL at 08:41

## 2022-07-12 RX ADMIN — SODIUM CHLORIDE 40 MG: 9 INJECTION, SOLUTION INTRAMUSCULAR; INTRAVENOUS; SUBCUTANEOUS at 06:05

## 2022-07-12 RX ADMIN — GABAPENTIN 200 MG: 100 CAPSULE ORAL at 16:09

## 2022-07-12 RX ADMIN — GABAPENTIN 200 MG: 100 CAPSULE ORAL at 08:41

## 2022-07-12 RX ADMIN — MIDODRINE HYDROCHLORIDE 10 MG: 5 TABLET ORAL at 12:52

## 2022-07-12 RX ADMIN — TICAGRELOR 90 MG: 90 TABLET ORAL at 20:51

## 2022-07-12 RX ADMIN — DOXYCYCLINE HYCLATE 100 MG: 100 CAPSULE ORAL at 20:51

## 2022-07-12 RX ADMIN — ATORVASTATIN CALCIUM 80 MG: 40 TABLET, FILM COATED ORAL at 20:51

## 2022-07-12 RX ADMIN — TICAGRELOR 90 MG: 90 TABLET ORAL at 08:41

## 2022-07-12 RX ADMIN — SODIUM CHLORIDE, PRESERVATIVE FREE 10 ML: 5 INJECTION INTRAVENOUS at 08:41

## 2022-07-12 RX ADMIN — ALLOPURINOL 100 MG: 100 TABLET ORAL at 08:41

## 2022-07-12 RX ADMIN — LANTHANUM CARBONATE 1000 MG: 500 TABLET, CHEWABLE ORAL at 17:16

## 2022-07-12 RX ADMIN — TIMOLOL MALEATE 1 DROP: 5 SOLUTION OPHTHALMIC at 12:52

## 2022-07-12 RX ADMIN — LATANOPROST 1 DROP: 50 SOLUTION OPHTHALMIC at 20:51

## 2022-07-12 RX ADMIN — SODIUM CHLORIDE, PRESERVATIVE FREE 10 ML: 5 INJECTION INTRAVENOUS at 20:52

## 2022-07-12 RX ADMIN — SODIUM ZIRCONIUM CYCLOSILICATE 10 G: 10 POWDER, FOR SUSPENSION ORAL at 17:15

## 2022-07-12 ASSESSMENT — ENCOUNTER SYMPTOMS
NAUSEA: 1
VOICE CHANGE: 0
CHEST TIGHTNESS: 0
CONSTIPATION: 1
PHOTOPHOBIA: 0
BACK PAIN: 1
SHORTNESS OF BREATH: 0
TROUBLE SWALLOWING: 0
ABDOMINAL DISTENTION: 0
DIARRHEA: 0
VOMITING: 0
COLOR CHANGE: 0
ABDOMINAL PAIN: 1
NAUSEA: 0
COUGH: 0
TROUBLE SWALLOWING: 1

## 2022-07-12 ASSESSMENT — PAIN SCALES - GENERAL
PAINLEVEL_OUTOF10: 0

## 2022-07-12 NOTE — PROGRESS NOTES
Department of Internal Medicine  Nephrology Attending Progress Note        SUBJECTIVE:  Mrs Carmina Gross resting comfortably in bed denies shortness of breath has been started on some dopamine in addition to her IV fluids blood pressures still only 90. Her midodrine has been increased to 10 mg 3 times daily,  heart rate slightly improved to 48 beats/min. Her high sensitivity troponins were low. No repeat EKG was performed today. Partha Saucedo [facility]   End-stage renal disease on dialysis Monday Wednesday Friday  Hypertension  Hyperlipidemia  Type 2 diabetes with neuropathy, retinopathy  Anemia of chronic kidney disease (patient Jehovah witness)  Secondary hyperparathyroid disease of renal origin  History of breast cancer rt side  History of coronary disease status post heart cath with balloon angioplasty 5/5/22  Carpal tunnel syndrome with bilateral release  History of osteomyelitis/discitis  L1-L2 on IV Vanco for 6 weeks now on suppressive doxycycline  History of spinal fusion of L3/L4/L5  History of glaucoma    Physical Exam:    Vitals:    07/12/22 0912   BP: (!) 88/40   Pulse: (!) 49   Resp: (!) 32   Temp: 98.2 °F (36.8 °C)   SpO2: 95%       I/O last 24 hours:  Intake/Output 1700/400  Weight: not done? 1619 K 66, alert, no acute distress  Neck: No JVD noted  Lungs: Diminished at the bases bilaterally. CV: Regular rate and rhythm but quite bradycardic.  No rub  Abd: Soft, nontender, nondistended.  Active bowel sounds  Skin: Warm and dry.  No rash on exposed extremities  Ext: 1+ RUE edema (h/o breast CA);  No BLE edema ; LUE AV fistula   Neuro: Awake, answers questions appropriately    DATA:    CBC with Differential:    Lab Results   Component Value Date/Time    WBC 5.5 07/12/2022 06:11 AM    RBC 4.62 07/12/2022 06:11 AM    HGB 12.9 07/12/2022 06:11 AM    HCT 41.5 07/12/2022 06:11 AM     07/12/2022 06:11 AM    MCV 89.8 07/12/2022 06:11 AM    MCH 27.9 07/12/2022 06:11 AM    MCHC 31.1 07/12/2022 06:11 AM    RDW 17.2 07/12/2022 06:11 AM    NRBC 0.0 02/18/2017 04:00 AM    SEGSPCT 70 03/12/2014 10:53 AM    BANDSPCT 1 02/18/2015 10:35 AM    LYMPHOPCT 12.3 07/12/2022 06:11 AM    PROMYELOPCT 1.8 02/18/2017 04:00 AM    MONOPCT 3.4 07/12/2022 06:11 AM    MYELOPCT 0.9 10/24/2017 10:45 AM    BASOPCT 0.2 07/12/2022 06:11 AM    MONOSABS 0.19 07/12/2022 06:11 AM    LYMPHSABS 0.68 07/12/2022 06:11 AM    EOSABS 0.01 07/12/2022 06:11 AM    BASOSABS 0.01 07/12/2022 06:11 AM     CMP:    Lab Results   Component Value Date/Time     07/12/2022 06:11 AM    K 5.6 07/12/2022 06:11 AM    K 4.7 07/11/2022 12:46 AM     07/12/2022 06:11 AM    CO2 21 07/12/2022 06:11 AM    BUN 41 07/12/2022 06:11 AM    CREATININE 4.2 07/12/2022 06:11 AM    GFRAA 13 07/12/2022 06:11 AM    LABGLOM 13 07/12/2022 06:11 AM    GLUCOSE 143 07/12/2022 06:11 AM    GLUCOSE 46 04/02/2012 11:00 AM    PROT 6.0 07/12/2022 06:11 AM    LABALBU 3.2 07/12/2022 06:11 AM    LABALBU 3.8 04/02/2012 11:00 AM    CALCIUM 9.3 07/12/2022 06:11 AM    BILITOT 0.3 07/12/2022 06:11 AM    ALKPHOS 202 07/12/2022 06:11 AM     07/12/2022 06:11 AM    ALT 97 07/12/2022 06:11 AM            latanoprost  1 drop Both Eyes Daily    brimonidine  1 drop Right Eye BID    And    timolol  1 drop Right Eye BID    [START ON 7/13/2022] vancomycin  1,000 mg IntraVENous Q MWF    allopurinol  100 mg Oral Daily    atorvastatin  80 mg Oral Nightly    doxycycline hyclate  100 mg Oral 2 times per day    gabapentin  200 mg Oral TID    ticagrelor  90 mg Oral BID    sodium chloride flush  5-40 mL IntraVENous 2 times per day    heparin (porcine)  5,000 Units SubCUTAneous 3 times per day    aspirin  81 mg Oral Daily    pantoprazole (PROTONIX) 40 mg injection  40 mg IntraVENous Daily    insulin lispro  0-6 Units SubCUTAneous TID WC    insulin lispro  0-3 Units SubCUTAneous Nightly    lanthanum  1,000 mg Oral TID WC    insulin glargine  5 Units SubCUTAneous Nightly    sodium chloride  500 mL IntraVENous Once    midodrine  10 mg Oral TID WC      sodium chloride 75 mL/hr at 07/11/22 2334    sodium chloride      dextrose      DOPamine 2.5 mcg/kg/min (07/11/22 1830)     sodium chloride flush, sodium chloride, ondansetron **OR** ondansetron, polyethylene glycol, acetaminophen **OR** acetaminophen, glucose, dextrose bolus **OR** dextrose bolus, glucagon (rDNA), dextrose    IMPRESSION/RECOMMENDATIONS:      End-stage renal disease normally on  Monday Wednesday Friday  Hyperkalemia we will give some Lokelma  Hypotension despite dopamine and IV fluids  Sinus bradycardia metoprolol discontinued but he remains on some timolol eyedrops  Anemia of chronic kidney disease JAYSHREE on hold as Hg above target  Secondary hyperparathyroidism of renal origin we will check  Phosphorus      Chino Resendiz MD  7/12/2022 1:20 PM

## 2022-07-12 NOTE — PROGRESS NOTES
INPATIENT CARDIOLOGY FOLLOW-UP    Name: Yohannes York    Age: 78 y.o. Date of Admission: 7/11/2022 12:11 AM    Date of Service: 7/12/2022    Primary Cardiologist: Dr. Checo Villafuerte    Chief Complaint: Follow-up for symptomatic bradycardia    Interim History:  No new overnight cardiac complaints. Currently with no complaints of CP, SOB, palpitations, dizziness, or lightheadedness. Sinus bradycardia on telemetry. Rates have been in the 45s. No evidence of high-grade AV block. Was hypotensive yesterday. Started on dopamine. Pressures have responded. Heart rates have remained stable.   Patient feels somewhat better today    Review of Systems:   Negative except as described above    Problem List:  Patient Active Problem List   Diagnosis    Diabetic retinopathy ([address]    Malignant neoplasm of right female breast ([address]    Atherosclerosis of native coronary artery of native heart without angina pectoris    Moderate obesity    Left ventricular hypertrophy    Herniated lumbar intervertebral disc    Lumbar degenerative disc disease    Pseudomeningocele    Lumbar radiculopathy    Lymphedema of arm    Anemia of chronic disease    Chronic diastolic CHF (congestive heart failure) ([address]    Hypertension    Glaucoma    Refusal of blood product    Elevated troponin    Controlled type 2 diabetes mellitus with chronic kidney disease on chronic dialysis, with long-term current use of insulin (HCC)    Vitreous hemorrhage ([address]    Patient is Restorationism    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 ([address]    Chronic pain syndrome    Lumbar post-laminectomy syndrome    Cervicalgia    Diabetic peripheral neuropathy ([address]    ESRD (end stage renal disease) ([address]    Bilateral carpal tunnel syndrome    Cardiac arrest ([address]    ESRD (end stage renal disease) on dialysis ([address]    Mixed hyperlipidemia    Lymphedema of right upper extremity    Coronary artery disease involving native coronary artery of native heart with angina pectoris [address]    Mitral valve disease    CAD in native artery    Lumbar stenosis without neurogenic claudication    Intractable low back pain    Unable to ambulate    NSTEMI (non-ST elevated myocardial infarction) ([facility]    Moderate protein-calorie malnutrition (HCC)    Ischemic cardiomyopathy    Lower abdominal pain    Pain    Hypotension    Vertebral osteomyelitis, chronic (HCC)    Symptomatic sinus bradycardia       Current Medications:    Current Facility-Administered Medications:     latanoprost (XALATAN) 0.005 % ophthalmic solution 1 drop, 1 drop, Both Eyes, Daily, Tamanna Gore MD, 1 drop at 07/12/22 1350    brimonidine (ALPHAGAN) 0.2 % ophthalmic solution 1 drop, 1 drop, Right Eye, BID, 1 drop at 07/12/22 1252 **AND** [DISCONTINUED] timolol (TIMOPTIC) 0.5 % ophthalmic solution 1 drop, 1 drop, Right Eye, BID, Cassie Slade MD, 1 drop at 07/12/22 1252    [START ON 7/13/2022] vancomycin 1000 mg IVPB in 250 mL D5W addavial, 1,000 mg, IntraVENous, Q MWF, Fanny Elizodno MD    sodium zirconium cyclosilicate (LOKELMA) oral suspension 10 g, 10 g, Oral, BID, Anat Mcgee MD    0.9 % sodium chloride infusion, , IntraVENous, Continuous, Fanny Elizondo MD, Last Rate: 75 mL/hr at 07/11/22 2334, New Bag at 07/11/22 2334    allopurinol (ZYLOPRIM) tablet 100 mg, 100 mg, Oral, Daily, Fanny Elizondo MD, 100 mg at 07/12/22 0841    atorvastatin (LIPITOR) tablet 80 mg, 80 mg, Oral, Nightly, Fanny Elizondo MD, 80 mg at 07/11/22 2024    doxycycline hyclate (VIBRAMYCIN) capsule 100 mg, 100 mg, Oral, 2 times per day, Morteza Spence MD, 100 mg at 07/12/22 0840    gabapentin (NEURONTIN) capsule 200 mg, 200 mg, Oral, TID, Fanny Elizondo MD, 200 mg at 07/12/22 1609    ticagrelor (BRILINTA) tablet 90 mg, 90 mg, Oral, BID, Fanny Elizondo MD, 90 mg at 07/12/22 0841    sodium chloride flush 0.9 % injection 5-40 mL, 5-40 mL, IntraVENous, 2 times per day, Morteza Spence MD, 10 mL at 07/12/22 9244    sodium chloride flush 0.9 % injection 5-40 mL, 5-40 mL, IntraVENous, PRN, Fanny Elizondo MD    0.9 % sodium chloride infusion, , IntraVENous, PRN, Fanny Elizondo MD    heparin (porcine) injection 5,000 Units, 5,000 Units, SubCUTAneous, 3 times per day, Kira Hammond MD, 5,000 Units at 07/11/22 2215    ondansetron (ZOFRAN-ODT) disintegrating tablet 4 mg, 4 mg, Oral, Q8H PRN **OR** ondansetron (ZOFRAN) injection 4 mg, 4 mg, IntraVENous, Q6H PRN, Fanny Elizondo MD, 4 mg at 07/11/22 1756    polyethylene glycol (GLYCOLAX) packet 17 g, 17 g, Oral, Daily PRN, Fanny Elizondo MD    acetaminophen (TYLENOL) tablet 650 mg, 650 mg, Oral, Q6H PRN **OR** acetaminophen (TYLENOL) suppository 650 mg, 650 mg, Rectal, Q6H PRN, Fanny Elizondo MD    aspirin EC tablet 81 mg, 81 mg, Oral, Daily, Fanny Elizondo MD, 81 mg at 07/12/22 0841    pantoprazole (PROTONIX) 40 mg in sodium chloride (PF) 10 mL injection, 40 mg, IntraVENous, Daily, Fanny Elizondo MD, 40 mg at 07/12/22 0605    insulin lispro (HUMALOG) injection vial 0-6 Units, 0-6 Units, SubCUTAneous, TID Fanny HILLS MD    insulin lispro (HUMALOG) injection vial 0-3 Units, 0-3 Units, SubCUTAneous, Nightly, Fanny Elizondo MD    lanthanum (FOSRENOL) chewable tablet 1,000 mg, 1,000 mg, Oral, TID REINIER, Fanny Elizondo MD, 1,000 mg at 07/12/22 1255    insulin glargine (LANTUS) injection vial 5 Units, 5 Units, SubCUTAneous, Nightly, Fanny Elizondo MD    glucose chewable tablet 16 g, 4 tablet, Oral, PRN, Fanny Elizondo MD, 16 g at 07/11/22 1755    dextrose bolus 10% 125 mL, 125 mL, IntraVENous, PRN **OR** dextrose bolus 10% 250 mL, 250 mL, IntraVENous, PRN, Fanny Elizondo MD    glucagon (rDNA) injection 1 mg, 1 mg, IntraMUSCular, PRN, Fanny Elizondo MD    dextrose 5 % solution, 100 mL/hr, IntraVENous, PRN, Fanny Elizondo MD    0.9 % sodium chloride bolus, 500 mL, IntraVENous, Once, Jorge Mckeon MD    midodrine (PROAMATINE) tablet 10 mg, 10 mg, Oral, TID WC, Jorge Mckeon MD, 10 mg at 07/12/22 1252    DOPamine (INTROPIN) 800 mg in dextrose 5 % 250 mL infusion, 1-20 mcg/kg/min, IntraVENous, Continuous, Kain Patterson MD, Last Rate: 3.1 mL/hr at 07/11/22 1830, 2.5 mcg/kg/min at 07/11/22 1830    Physical Exam:  BP (!) 93/42   Pulse (!) 46   Temp 98.6 °F (37 °C)   Resp (!) 31   Ht 5' 10\" (1.778 m)   Wt 145 lb 1 oz (65.8 kg)   SpO2 95%   BMI 20.81 kg/m²   Wt Readings from Last 3 Encounters:   07/11/22 145 lb 1 oz (65.8 kg)   06/28/22 151 lb (68.5 kg)   05/11/22 175 lb 0.7 oz (79.4 kg)     Appearance: Awake, alert, no acute respiratory distress  Skin: Intact, no rash  Head: Normocephalic, atraumatic  Eyes: EOMI, no conjunctival erythema  ENMT: No pharyngeal erythema, MMM, no rhinorrhea  Neck: Supple, no elevated JVP, no carotid bruits  Lungs: Clear to auscultation bilaterally. No wheezes, rales, or rhonchi. Cardiac: PMI nondisplaced, Regular rhythm with a normal rate, S1 & S2 normal, no murmurs  Abdomen: Soft, nontender, +bowel sounds  Extremities: Moves all extremities x 4, no lower extremity edema  Neurologic: No focal motor deficits apparent, normal mood and affect  Peripheral Pulses: Intact posterior tibial pulses bilaterally    Intake/Output:    Intake/Output Summary (Last 24 hours) at 7/12/2022 1703  Last data filed at 7/11/2022 1830  Gross per 24 hour   Intake 868.63 ml   Output --   Net 868.63 ml     No intake/output data recorded.     Laboratory Tests:  Recent Labs     07/11/22  0046 07/11/22  1855 07/12/22  0611    136 137   K 4.7 5.0 5.6*   CL 96* 100 101   CO2 26 22 21*   BUN 45* 38* 41*   CREATININE 4.8* 4.2* 4.2*   GLUCOSE 171* 100* 143*   CALCIUM 9.8 9.4 9.3     Lab Results   Component Value Date/Time    MG 2.1 07/12/2022 06:11 AM     Recent Labs     07/11/22  0046 07/12/22  0611   ALKPHOS 216* 202*   ALT 76* 97*   AST 75* 103*   PROT 6.2* 6.0*   BILITOT 0.3 0.3   LABALBU 3.2* 3.2*     Recent Labs     07/11/22  0046 07/11/22  1855 07/12/22  0611   WBC 5.0  --  5.5   RBC 4.26  --  4.62   HGB 12.1 13.2 12.9   HCT 38.8 42.2 41.5   MCV 91.1  --  89.8   MCH 28.4  --  27.9   MCHC 31.2*  --  31.1*   RDW 17.3*  --  17.2*     --  127*   MPV 12.7*  --  NOT CALC     Lab Results   Component Value Date    CKTOTAL 25 07/11/2022    CKMB 2.7 05/20/2021    TROPONINI 0.83 (H) 05/20/2021    TROPONINI 0.81 (H) 05/20/2021    TROPONINI 0.82 (H) 05/19/2021     Lab Results   Component Value Date    INR 1.1 06/15/2022    INR 1.0 01/20/2022    INR 1.0 12/02/2021    PROTIME 12.3 06/15/2022    PROTIME 11.1 01/20/2022    PROTIME 10.9 12/02/2021     Lab Results   Component Value Date    TSH 4.170 07/11/2022     Lab Results   Component Value Date    LABA1C 5.1 05/04/2022     No results found for: EAG  Lab Results   Component Value Date    CHOL 103 05/04/2022    CHOL 100 04/15/2022    CHOL 101 05/19/2021     Lab Results   Component Value Date    TRIG 66 05/04/2022    TRIG 107 04/15/2022    TRIG 106 05/19/2021     Lab Results   Component Value Date    HDL 42 05/04/2022    HDL 27 04/15/2022    HDL 38 05/19/2021     Lab Results   Component Value Date    LDLCALC 48 05/04/2022    LDLCALC 52 04/15/2022    LDLCALC 42 05/19/2021     Lab Results   Component Value Date    LABVLDL 13 05/04/2022    LABVLDL 21 04/15/2022    LABVLDL 21 05/19/2021     No results found for: CHOLHDLRATIO  No results for input(s): PROBNP in the last 72 hours. Cardiac Tests:    1. Prior cardiac work-up and history:  Lexiscan stress test May 19, 2021, abnormal with a large fixed defect in the apical and septal wall small fixed defect in the inferior apical wall partially reversible defect in the small area of the anterior lateral wall and EF 25% with apical septal akinesis and moderate global hypokinesis and dilated left ventricle during stress and rest and was a high risk study  2. Left heart cath May 2021 she underwent stenting to the LAD and RCA. CONCLUSION: Coronary artery disease: Left main:  20% eccentric mid vessel narrowing.  LAD:  50% proximal vessel narrowing and 95% mid vessel stenosis. Trivial diagonal branch with 90% stenosis. LCX:  Occluded after a small caliber first marginal branch which is a chronic total occlusion.  The second larger marginal branch filled late. RCA:  Large, dominant vessel with 90% heavily calcified mid vessel stenosis.  50% disease at the level of the crux and 70% ostial stenosis of the posterior descending artery branch. Markedly elevated left ventricular end-diastolic pressure. Successful balloon angioplasty with deployment of drug-eluting coronary stent to the mid LAD with very good results. Successful balloon angioplasty with deployment of drug-eluting coronary stent to the mid RCA with poststent deployment dilatation with a larger high-pressure noncompliant balloon with good results. 3. 2D echocardiogram on May 21, 2021 left ventricle is dilated There is apical, septal and basal inferior wall severe hypokinesis to akinesis Ejection fraction is visually estimated at 30+/-5%. There is doppler evidence of stage III diastolic dysfunction. Normal right ventricular size. Right ventricle global systolic function is mildly reduced . The left atrium is moderately dilated. Severe posterior mitral annular calcification. Focal calcification mitral valve leaflets. Chordal calcification is present. A mobile well defined 1.0 cm by 0.5 cm echo density is noted on the ventricular aspect of the mitral valve suggestive of a fibroelastoma: clinical correlation is needed. No mitral stenosis. Mild mitral regurgitation. Mild to moderate tricuspid regurgitation. Moderate pulmonary hypertension. Aortic root is sclerotic and calcified.   4. 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  KIARA November 11, 2021 for mitral leaflet mass Normal LV function, normal RV function, no hemodynamically significant valve disease(mobile posterior leaflet echodensity with leaflet restriction and mild MR), no evidence of saphenous vein graft to the RCA and a saphenous vein graft to the circumflex and excision of mitral valve mass  7. January 3, 2022 osteomyelitis left middle finger left hand, January 20, 2022 amputation left third digit distal phalanx for osteomyelitis (open heart postponed) patient stopped taking aspirin at that time but was only of Brilinta a day or 2  8. Chronic hemodialysis on Monday Wednesday and Friday/renal osteodystrophy  9. Anemia  10. Hospitalization April 15 through April 22 for osteomyelitis of the lumbar spine no abdominal  11. KIARA 4/19/2022 ([place]):  LV systolic function mildly reduced.  Ejection fraction is visually estimated at 40-45%.  Moderate mitral annular calcification.   Valvular and subvalvular calcification.  Mild mitral regurgitation.  No definitive evidence of papillary fibroelastoma.   12. Non-ST elevation MI cath / PCI 5/5/22 ( Dr. Taye Galan ): .  In-stent stenosis of mid RCA with successful balloon  angioplasty (pre-PCI LATOSHA-3 flow, post-PCI LATOSHA-3 flow, pre-PCI stenosis 90%, post-PCI stenosis less than 10%). 2.  Patent stent in the LAD. 3.  Totally occluded left circumflex artery.    Discharged to SNF for 6 weeks of antibiotics        I have personally participated in the history, exam, diagnosis with my Advanced Practice Nurse/Physician Assistant on the date of service. I discussed pertinent history, exam findings, and treatment plan with our physician extender.     Impression  1. Bradycardia and hypotension (with a history of hypertension) after taking 2 Percocet.  Hypotension resolved after fluid boluses, increasing midodrine  2. Elevated troponin with no chest pain, no acute coronary syndrome and a history of elevated troponins,, nonspecific T wave changes laterally which were present prior EKGs  3.  Ischemic cardiomyopathy coronary artery disease with PCI/drug-eluting stents to the LAD and RCA in May 2021 following a non-ST elevation MI, chronic circumflex occlusion known by left heart cath in May 2021 with a trivial diagonal branch 90%.  And a non-ST elevation MI May 5, 2022 with a patent stent to the LAD and status post in-stent stenosis of the mid RCA with successful balloon angioplasty  4. KIARA echocardiogram in April 2022 with an EF of 40 to 45% and moderate MAC and valvular calcification and mild mitral regurgitation and no evidence of a papillary fibroelastoma (KIARA in November 2021 showed a mobile posterior leaflet echodensity attached to the ventricular side of the posterior leaflet 1 x 1.2 cm with restrictive excursion and mild MR mild TR)  5. End-stage renal disease on dialysis  6. Mitral valve mass  7. Insulin-dependent diabetic with neuropathy retinopathy and nephropathy  8. Chronic anemia  9. Buddhist  10. Hyperlipidemia  11. Bladder cancer and status post chemotherapy osteomyelitis of the lumbar spine With a back fusion in April 2022 and a spinal cord stimulator for intractable back pain completed IV antibiotics  12. Right breast cancer status postmastectomy  13.  history of decompressive lumbar laminectomy, spinal cord stimulator  14. Status post amputation of left hand distal middle finger for osteomyelitis  15. History of obesity        Plans  1. Continue to hold beta-blockers  2. There is no indication for temporary pacemaker at this time. We will assess for chronotropic incompetence. 3. Midodrine has been increased to 10 mg 3 times daily. Can be uptitrated further if needed  4. Volume management per nephrology. continue Lipitor and aspirin. Continue Brilinta with recent PCI done in May. 5. Unable to use ACE inhibitors due to end-stage renal disease  6. Unable to use guideline directed medical therapy overall on account of hypotension and now on account of significant symptomatic bradycardia. 7. So far, her hypotension has responded to dopamine. Pressures are currently doing better. Patient continues to have sinus bradycardia. TSH is within normal limits. Will assess for chronotropic incompetence when able. We will continue monitoring on telemetry for any evidence of high-grade AV block.       Bridget Lilly MD, [facility]    NOTE: This report was transcribed using voice recognition software. Every effort was made to ensure accuracy; however, inadvertent computerized transcription errors may be present.

## 2022-07-12 NOTE — H&P
Critical Care Admit/Consult Note         Patient Julian Linton   MRN -  [id number]   Acct # - [de-identified]   - 1955      Date of Admission -  2022 12:11 AM  Date of evaluation -  2022  [address] Day - 1    ADMIT/CONSULT DETAILS     Reason for Admit/Consult   Hypotension and bradycardia in setting of EKG changes  [facility],*  Primary Care Physician - Grey Montes De Oca MD       2022: Patient remains in the 50's despite dopamine, BP remains stable at this time. Patient with no acute distress, reports that she feels improved from her previous. Denies any chest pain at this time. HPI   The patient is a 78 y.o. female with significant past medical history of ESRD on MWF dialysis, Anemia of CKD, Hyperparathyroidism, HTN, DM, CAD, Breast cancer, Osteomyelitis/discitis, spinal fusion, glaucoma. Patient with cardiac catherization showing RCA stenosis in stent with balloon angioplasty, patent LAD stent and total occlusion of Left Circumflex on 2022. Patient presented to the ER with complaints of hypotension after being given 2 percocet at [facility] where patient resides. Patient received 3 doses of midodrine prior to arrival with report of no change. BP on arrival 80/49 in ED, with bradycardia of 47. Patient reporting fatigue and generalized malaise similar to previous episodes following dialysis when patient had \"too much fluid off\", which she reports has improved with fluid replacement. Patient with MWF dialysis at [facility] through AV fistula on left. Patient was given fluids, noted to have improvement. Patient initially admitted and was started on dialysis when Dr. Tae Dixon noted bradycardia and hypotension with EKG changes and decided to discontinue dialysis with decision to transfer patient to ICU for cardiac evaluation.      Past Medical History         Diagnosis Date    Acute infection of bone CYSTOSCOPY      DIALYSIS FISTULA CREATION Left 01/31/2018    upper arm/Dr. Margarita Medrano    ECHO COMPL W DOP COLOR FLOW  02/14/2013         ECHO COMPLETE  09/17/2013         FINGER AMPUTATION Left 01/20/2022    AMPUTATION OF LEFT THIRD DIGIT, DISTAL PHALANX performed by Pauly Hernandez MD at [address]      right    OTHER SURGICAL HISTORY  09/27/2011    PPV, membranectomy, laser Right eye    OTHER SURGICAL HISTORY  insertion lumbar drain insertion    10/12/`14    OTHER SURGICAL HISTORY  10/22/2015    percutaneous lead placement for spinal cord stimulator    OTHER SURGICAL HISTORY  11/03/2015    Spinal; cord stimulator- turned off as of 3-10-20     PORT SURGERY N/A 6/27/2022    PORT REMOVAL performed by Miriam Santizo MD at [address] [place],UP ARM BASILIC VEIN TRANSPOSIT Left 05/15/2018    TRANSPOSITION STAGE II AV FISTULA - LEFT UPPER ARM performed by Ulysses Paganini, MD at [address] [facility] ANGIOACCESS AV FISTULA Left 09/25/2018    SUPERFICIALIZATION AV FISTULA - LEFT ARM performed by Ulysses Paganini, MD at [address] [facility] W/VITRECTOMY ANY METH Left 04/10/2018    PARS PLANA VITRECTOMY 25 GAUGE RETINAL DETACHMENT REPAIR air fluid exchange, endolaser performed by Luke Sesay MD at [address]      [address]    TRANSESOPHAGEAL ECHOCARDIOGRAM  11/11/2021    Dr. Kary Chung TRANSESOPHAGEAL ECHOCARDIOGRAM  04/19/2022        TUNNELED VENOUS CATHETER PLACEMENT  11/15/2017    VITRECTOMY Left 04/10/2018    PARS PLANA VITRECTOMY; RETINAL DETACHMENT REPAIR; GAS BUBBLE; LASER LEFT EYE       Current Medications   Current Medications    latanoprost  1 drop Both Eyes Daily    brimonidine  1 drop Right Eye BID    [START ON 7/13/2022] vancomycin  1,000 mg IntraVENous Q MWF    sodium zirconium cyclosilicate  10 g Oral BID    allopurinol  100 mg Oral Daily    atorvastatin  80 mg Oral Nightly    doxycycline hyclate  100 mg Oral 2 times per day    gabapentin  200 mg Oral TID    ticagrelor  90 mg Oral BID    sodium chloride flush  5-40 mL IntraVENous 2 times per day    heparin (porcine)  5,000 Units SubCUTAneous 3 times per day    aspirin  81 mg Oral Daily    pantoprazole (PROTONIX) 40 mg injection  40 mg IntraVENous Daily    insulin lispro  0-6 Units SubCUTAneous TID     insulin lispro  0-3 Units SubCUTAneous Nightly    lanthanum  1,000 mg Oral TID     insulin glargine  5 Units SubCUTAneous Nightly    sodium chloride  500 mL IntraVENous Once    midodrine  10 mg Oral TID      sodium chloride flush, sodium chloride, ondansetron **OR** ondansetron, polyethylene glycol, acetaminophen **OR** acetaminophen, glucose, dextrose bolus **OR** dextrose bolus, glucagon (rDNA), dextrose  IV Drips/Infusions   sodium chloride 75 mL/hr at 07/11/22 2334    sodium chloride      dextrose      DOPamine 2.5 mcg/kg/min (07/11/22 1830)     Home Medications  Medications Prior to Admission: acetaminophen (TYLENOL) 325 MG tablet, Take 650 mg by mouth every 4 hours as needed for Pain  Acidophilus Lactobacillus CAPS, Take 1 capsule by mouth every morning  aspirin 81 MG EC tablet, Take 81 mg by mouth every morning  meclizine (ANTIVERT) 12.5 MG tablet, Take 12.5 mg by mouth every 8 hours as needed for Dizziness  mirtazapine (REMERON) 7.5 MG tablet, Take 7.5 mg by mouth nightly  oxyCODONE-acetaminophen (PERCOCET) 5-325 MG per tablet, Take 2 tablets by mouth every 8 hours as needed for Pain.  prochlorperazine (COMPAZINE) 5 MG tablet, Take 5 mg by mouth every 6 hours as needed (NAUSEA AND VOMITING)  sevelamer (RENVELA) 800 MG tablet, Take 1 tablet by mouth 3 times daily (with meals)  ZINC OXIDE, TOPICAL, (SECURA PROTECTIVE) 10 % CREA, Apply 1 Dose topically 2 times daily -BUTTOCKS-  Vancomycin HCl in Dextrose (VANCOCIN HCL IV), Infuse 1,000 mg intravenously every 72 hours **MON-WED-FRI** -SEND TO DIALYSIS W/ PT-  doxycycline hyclate (VIBRAMYCIN) 100 MG capsule, Take 1 capsule by mouth every 12 hours  midodrine (PROAMATINE) 5 MG tablet, Take 5 mg by mouth daily as needed (SBP <100. COURTNEY REPEAT X 1 DURING DIALYSIS)   gabapentin (NEURONTIN) 100 MG capsule, Take 2 capsules by mouth 3 times daily for 180 days. atorvastatin (LIPITOR) 80 MG tablet, Take 80 mg by mouth nightly  bumetanide (BUMEX) 2 MG tablet, Take 2 mg by mouth three times a week **SUN-TUE-THURS**  isosorbide mononitrate (IMDUR) 30 MG extended release tablet, Take 30 mg by mouth daily  insulin glargine (LANTUS) 100 UNIT/ML injection vial, Inject 5 Units into the skin nightly   lidocaine-prilocaine (EMLA) 2.5-2.5 % cream, Apply 1 Dose topically three times a week **MON-WED-FRI** TO THE SHUNT SITE  metoprolol succinate (TOPROL XL) 25 MG extended release tablet, Take 1 tablet by mouth daily  lanthanum (FOSRENOL) 1000 MG chewable tablet, Take 1,000 mg by mouth 3 times daily (with meals)   allopurinol (ZYLOPRIM) 100 MG tablet, Take 1 tablet by mouth daily  ticagrelor (BRILINTA) 90 MG TABS tablet, Take 1 tablet by mouth 2 times daily  B Complex-C-Folic Acid (BONI CAPS) 1 MG CAPS, Take 1 mg by mouth nightly   omeprazole (PRILOSEC) 20 MG delayed release capsule, Take 20 mg by mouth daily as needed (FOR GI UPSET)   LUMIGAN 0.01 % SOLN ophthalmic drops, Place 1 drop into the right eye nightly   COMBIGAN 0.2-0.5 % ophthalmic solution, Place 1 drop into the right eye every 12 hours   Diet/Nutrition   ADULT DIET; Regular; 4 carb choices (60 gm/meal)  Allergies   Furosemide  Social History   Tobacco   reports that she has never smoked. She has never used smokeless tobacco.    Alcohol     reports no history of alcohol use.     Occupational history :    Family History         Problem Relation Age of Onset    Breast Cancer Mother 61    Hypertension Mother     Heart Disease Father     Prostate Cancer Father     Breast Cancer Maternal Grandmother 60       Sleep History   n/a    ROS     REVIEW OF SYSTEMS:  Review of Systems Constitutional: Positive for fatigue. Negative for chills and fever. HENT: Negative for trouble swallowing and voice change. Eyes: Negative for photophobia and visual disturbance. Respiratory: Negative for cough and shortness of breath. Cardiovascular: Negative for chest pain and palpitations. Gastrointestinal: Positive for abdominal pain. Negative for diarrhea, nausea and vomiting. Genitourinary: Negative for dysuria, frequency and urgency. Musculoskeletal: Positive for back pain. Negative for neck pain and neck stiffness. Skin: Negative for rash and wound. Neurological: Positive for dizziness (improved). Negative for weakness and light-headedness. Psychiatric/Behavioral: Negative for behavioral problems and confusion. The patient is nervous/anxious. Lines and Devices   Left HD fistula  Implanted Venous port  Right PIV    Mechanical Ventilation Data   VENT SETTINGS (Comprehensive)     Additional Respiratory Assessments  Heart Rate: (!) 49  Resp: (!) 32  SpO2: 95 %    ABG  Lab Results   Component Value Date/Time    PH 7.390 06/15/2022 09:39 PM    PCO2 46.6 06/15/2022 09:39 PM    PO2 104.6 06/15/2022 09:39 PM    HCO3 27.6 06/15/2022 09:39 PM    O2SAT 98.6 06/15/2022 09:39 PM     Lab Results   Component Value Date/Time    MODE 8L breathing tx 06/15/2022 09:39 PM     Vitals    height is 5' 10\" (1.778 m) and weight is 145 lb 1 oz (65.8 kg). Her oral temperature is 98.2 °F (36.8 °C). Her blood pressure is 88/40 (abnormal) and her pulse is 49 (abnormal). Her respiration is 32 (abnormal) and oxygen saturation is 95%.        Temperature Range: Temp: 98.2 °F (36.8 °C) Temp  Av.6 °F (36.4 °C)  Min: 96.2 °F (35.7 °C)  Max: 98.2 °F (36.8 °C)  BP Range:  Systolic (20NDC), CHD:642 , Min:68 , PMD:155     Diastolic (33FZC), TXW:28, Min:33, Max:74    Pulse Range: Pulse  Av.7  Min: 41  Max: 52  Respiration Range: Resp  Av.2  Min: 11  Max: 32  Current Pulse Ox[de-identified]  SpO2: 95 %  24HR Pulse Ox Range: SpO2  Av.9 %  Min: 93 %  Max: 98 %  Oxygen Amount and Delivery:        I/O (24 Hours)    Patient Vitals for the past 8 hrs:   BP Temp Temp src Pulse Resp SpO2   22 0912 (!) 88/40 98.2 °F (36.8 °C) Oral (!) 49 (!) 32 95 %       Intake/Output Summary (Last 24 hours) at 2022 1514  Last data filed at 2022 1830  Gross per 24 hour   Intake 868.63 ml   Output --   Net 868.63 ml     I/O last 3 completed shifts: In: 1673.7 [I.V.:1373.7]  Out: 400      Patient Vitals for the past 96 hrs (Last 3 readings):   Weight   22 1140 145 lb 1 oz (65.8 kg)   22 0033 134 lb (60.8 kg)         Drains/Tubes Outputs      Exam   Physical Exam  Vitals reviewed. Constitutional:       General: She is not in acute distress. Appearance: Normal appearance. HENT:      Head: Normocephalic. Right Ear: External ear normal.      Left Ear: External ear normal.      Nose: Nose normal.      Mouth/Throat:      Pharynx: Oropharynx is clear. Eyes:      General:         Right eye: No discharge. Left eye: No discharge. Conjunctiva/sclera: Conjunctivae normal.   Cardiovascular:      Rate and Rhythm: Regular rhythm. Bradycardia present. Heart sounds: No friction rub. No gallop. Pulmonary:      Effort: Pulmonary effort is normal. No respiratory distress. Breath sounds: No stridor. No rhonchi or rales. Abdominal:      General: There is no distension. Palpations: Abdomen is soft. Tenderness: There is abdominal tenderness. There is no guarding or rebound. Musculoskeletal:         General: Tenderness present. No deformity. Cervical back: Normal range of motion and neck supple. No rigidity or tenderness. Right lower leg: No edema. Left lower leg: No edema. Skin:     General: Skin is warm. Coloration: Skin is not jaundiced. Findings: No erythema. Neurological:      Mental Status: She is alert and oriented to person, place, and time.       Sensory: No sensory deficit. Motor: No weakness. Psychiatric:         Mood and Affect: Mood normal.         Behavior: Behavior normal.        Data   Old records and images have been reviewed    Lab Results   CBC     Lab Results   Component Value Date/Time    WBC 5.5 07/12/2022 06:11 AM    RBC 4.62 07/12/2022 06:11 AM    HGB 12.9 07/12/2022 06:11 AM    HCT 41.5 07/12/2022 06:11 AM     07/12/2022 06:11 AM    MCV 89.8 07/12/2022 06:11 AM    MCH 27.9 07/12/2022 06:11 AM    MCHC 31.1 07/12/2022 06:11 AM    RDW 17.2 07/12/2022 06:11 AM    NRBC 0.0 02/18/2017 04:00 AM    SEGSPCT 70 03/12/2014 10:53 AM    BANDSPCT 1 02/18/2015 10:35 AM    LYMPHOPCT 12.3 07/12/2022 06:11 AM    PROMYELOPCT 1.8 02/18/2017 04:00 AM    MONOPCT 3.4 07/12/2022 06:11 AM    MYELOPCT 0.9 10/24/2017 10:45 AM    BASOPCT 0.2 07/12/2022 06:11 AM    MONOSABS 0.19 07/12/2022 06:11 AM    LYMPHSABS 0.68 07/12/2022 06:11 AM    EOSABS 0.01 07/12/2022 06:11 AM    BASOSABS 0.01 07/12/2022 06:11 AM       BMP   Lab Results   Component Value Date/Time     07/12/2022 06:11 AM    K 5.6 07/12/2022 06:11 AM    K 4.7 07/11/2022 12:46 AM     07/12/2022 06:11 AM    CO2 21 07/12/2022 06:11 AM    BUN 41 07/12/2022 06:11 AM    CREATININE 4.2 07/12/2022 06:11 AM    GLUCOSE 143 07/12/2022 06:11 AM    GLUCOSE 46 04/02/2012 11:00 AM    CALCIUM 9.3 07/12/2022 06:11 AM       LFTS  Lab Results   Component Value Date/Time    ALKPHOS 202 07/12/2022 06:11 AM    ALT 97 07/12/2022 06:11 AM     07/12/2022 06:11 AM    PROT 6.0 07/12/2022 06:11 AM    BILITOT 0.3 07/12/2022 06:11 AM    BILIDIR <0.2 05/25/2021 05:20 AM    IBILI see below 05/25/2021 05:20 AM    LABALBU 3.2 07/12/2022 06:11 AM    LABALBU 3.8 04/02/2012 11:00 AM       INR  No results for input(s): PROTIME, INR in the last 72 hours. APTT  No results for input(s): APTT in the last 72 hours.     Lactic Acid  Lab Results   Component Value Date/Time    LACTA 1.0 07/11/2022 12:46 AM    LACTA 1.0 06/15/2022 06:53 PM    LACTA 0.8 11/09/2017 12:20 PM        BNP   No results for input(s): BNP in the last 72 hours. Cultures     No results for input(s): BC in the last 72 hours. No results for input(s): Melchor Winfield in the last 72 hours. No results for input(s): LABURIN in the last 72 hours. Radiology     XR CHEST PORTABLE   Final Result   No acute process. SYSTEMS ASSESSMENT    Neuro   Alert, Oriented, able to answer questions without difficulty  Continue to monitor mentation     Respiratory   Patient arrived on NC  Denies any shortness of breath  100% on room air  Keep O2 sat 90-92%    Cardiovascular   Hypotension ( improved)  Given 1250 cc IVF  On NS infusion of 75cc/hr  On midodrine at home, will increase to TID  Previous ECHO with EF of 40-45% in 4/2022  Will continue to monitor     Bradycardia  Denies any chest pain  HR in 50's  EKG changes noted including 1st degree block  Previous Cardiac catherization with balloon angioplasty within previous stent in RCA noted on 5/4/2022, additional complete occlusion of LCx at that time. On ASA and Ticragrelor at home, will continue  Cardiology consulted, appreciate input      Gastrointestinal   Reports of previous diarrhea, nausea, vomiting > 1 week ago  Monitor for any diarrhea or blood in stool    Renal   ESRD on Dialysis MWF  Patient with incomplete dialysis treatment yesterday  Dialysis today  Nephrology following        Infectious Disease   Blood cultures and urine cultures sent  Afebrile at this time     Chronic Vertebral Osteomyelitis   Continue home Vancocin 1000 mg 3x, doxycycline 100 mg BID  Hematology/Oncology   Monitor for any changes to Hgb, hGb 12.1 at this time  continued bleeding near central line site, heparin held and sandbag placed  Hgb 12.9, will continue to monitor  NO BLOOD PRODUCTS as patient is a Yazidi.      Endocrine   T2DM   Monitor glucose    Social/Spiritual/DNR/Other   Full Code per records  Patient from nursing facility, [address] in records    Forrest Day, [place]  Emergency Medicine PGY-2  3:14 PM  07/12/22 7/12/2022  3:14 PM     Critical Care Attending Addendum:    Patient seen and examined with the house staff. X-rays personally reviewed through the PACS. Family is updated at the bedside as available. Additional findings listed below as necessary. Additional comments:  1. Persistently hypotensive off dopamine so continue. Check weight to compare to dry weight. 2. Continued bradycardia despite dopamine so stopped timolol eye drops. 3. Hyperkalemia given Lokalma. 4. Vertebral osteomyelitis on suppression and iv vanco.  5. ESRD for HD tomorrow.     30 min CCT

## 2022-07-12 NOTE — PATIENT CARE CONFERENCE
Intensive Care Daily Quality Rounding Checklist      ICU Team Members: Dr. Erick Lynn; Dr. Therese Washington (Resident);  Clinical Pharmacist; Charge Nurse; Bedside Nurse; RT     ICU Day #: 2    Intubation Date: N/A    Ventilator Day #: N/A    Central Line Insertion Date: July 11        Day #: NUMBER: 2     Arterial Line Insertion Date: N/A      Day #: N/A    Temporary Hemodialysis Catheter Insertion Date: Tessio LUE      Day #: N/A    DVT Prophylaxis: Heparin SQ    GI Prophylaxis: Protonix    Gant Catheter Insertion Date: n/a       Day #: n/a      Continued need (if yes, reason documented and discussed with physician): n/a    Skin Issues/ Wounds and ordered treatment discussed on rounds: Previous Mediport site/ finger tip amputation healed    Goals/ Plans for the Day: Daily labs, HD per Nephrology, HR management per Cardiology, wean Dopamine drip as able

## 2022-07-12 NOTE — PLAN OF CARE
Problem: ABCDS Injury Assessment  Goal: Absence of physical injury  Outcome: Progressing     Problem: Skin/Tissue Integrity  Goal: Absence of new skin breakdown  Description: 1. Monitor for areas of redness and/or skin breakdown  2. Assess vascular access sites hourly  3. Every 4-6 hours minimum:  Change oxygen saturation probe site  4. Every 4-6 hours:  If on nasal continuous positive airway pressure, respiratory therapy assess nares and determine need for appliance change or resting period.   Outcome: Progressing     Problem: Discharge Planning  Goal: Discharge to home or other facility with appropriate resources  Outcome: Not Progressing     Problem: Safety - Adult  Goal: Free from fall injury  Outcome: Progressing

## 2022-07-12 NOTE — CARE COORDINATION
Pt admit ICU for hypotension. Currently on dopamine drip. Iv fluids. Pt HD pt at [facility] dialysis. Pt from [facility] and is bedhold and can return. Pre-cert not needed but would like therapies to possibly obtain precert. Pt readmit for different dx. Currently receiving antibiotics in dialysis. Will continue to follow-mjo    The Plan for Transition of Care is related to the following treatment goals: BOBY    The Patient and/or patient representative pt was provided with a choice of provider and agrees   with the discharge plan. [x] Yes [] No    Freedom of choice list was provided with basic dialogue that supports the patient's individualized plan of care/goals, treatment preferences and shares the quality data associated with the providers.  [x] Yes [] No

## 2022-07-12 NOTE — DISCHARGE INSTR - COC
Continuity of Care Form    Patient Name: Julian Ward   :  1955  MRN:  [id number]    Admit date:  2022  Discharge date:  22    Code Status Order: Full Code   Advance Directives:      Admitting Physician:  Anabella Devi MD  PCP: Heath Chino MD    Discharging Nurse: Pierre Flores RN   [address] Unit/Room#: 718 [facility]  Discharging Unit Phone Number: [phone]    Emergency Contact:   Extended Emergency Contact Information  Primary Emergency Contact: Pita Gaines  Address: [address] [facility], [address] [place] [address],[address] Nena Chavez of [address] Phone: [phone]  Relation: Brother/Sister  Secondary Emergency Contact: Elmer Pitts  Home Phone: [phone]  Mobile Phone: [phone]  Relation: Brother/Sister    Past Surgical History:  Past Surgical History:   Procedure Laterality Date    AMPUTATION      right great toe    ANKLE SURGERY      correction on charcot joint of right ankle    BONE BIOPSY N/A 2022    BONE BIOPSY PERCUTANEOUS L1/L2 performed by Guillermo Bianchi MD at [address] [facility]  2021    Dr Prieto Figures Left 2020    [facility] performed by Claudette Mark MD at [address]      bilateral    CHOLECYSTECTOMY      COLONOSCOPY      CORONARY ANGIOPLASTY  2022    Dr. Giovanni Burgos - RCA angioplasty    [address] Left 2018    upper arm/Dr. Abundio Wilder    ECHO COMPL W DOP COLOR FLOW  2013         ECHO COMPLETE  2013         FINGER AMPUTATION Left 2022    AMPUTATION OF LEFT THIRD DIGIT, DISTAL PHALANX performed by Qiana Roberson MD at [address]      right    OTHER SURGICAL HISTORY  2011    PPV, membranectomy, laser Right eye    OTHER SURGICAL HISTORY  insertion lumbar drain insertion    10/12/`14    OTHER SURGICAL HISTORY  10/22/2015    percutaneous lead placement for spinal cord stimulator    OTHER SURGICAL HISTORY  11/03/2015    Spinal; cord stimulator- turned off as of 3-10-20     PORT SURGERY N/A 6/27/2022    PORT REMOVAL performed by Maria Kirkpatrick MD at [facility] AV ANAST,UP ARM BASILIC VEIN TRANSPOSIT Left 05/15/2018    TRANSPOSITION STAGE II AV FISTULA - LEFT UPPER ARM performed by Drew Hurley MD at [address] [facility],[address] ANGIOACCESS AV FISTULA Left 09/25/2018    SUPERFICIALIZATION AV FISTULA - LEFT ARM performed by Drew Hurley MD at [facility] RPR RETINAL [place] W/VITRECTOMY ANY METH Left 04/10/2018    PARS PLANA VITRECTOMY 25 GAUGE RETINAL DETACHMENT REPAIR air fluid exchange, endolaser performed by Mary Watts MD at [address] [facility]      L2    TRANSESOPHAGEAL ECHOCARDIOGRAM  11/11/2021    Dr. Salome Pond    TRANSESOPHAGEAL ECHOCARDIOGRAM  04/19/2022        TUNNELED VENOUS CATHETER PLACEMENT  11/15/2017    VITRECTOMY Left 04/10/2018    PARS PLANA VITRECTOMY; RETINAL DETACHMENT REPAIR; GAS BUBBLE; LASER LEFT EYE       Immunization History:   Immunization History   Administered Date(s) Administered    COVID-19, MODERNA BLUE border, Primary or Immunocompromised, (age 12y+), IM, 100 mcg/0.5mL 03/11/2021, 04/08/2021    Pneumococcal Polysaccharide ([id number]) 12/04/2018       Active Problems:  Patient Active Problem List   Diagnosis Code    Diabetic retinopathy ([address] E11.319    Malignant neoplasm of right female breast ([address] [address] C50.911    Atherosclerosis of native coronary artery of native heart without angina pectoris I25.10    Moderate obesity E66.8    Left ventricular hypertrophy I51.7    Herniated lumbar intervertebral disc M51.26    Lumbar degenerative disc disease M51.36    Pseudomeningocele G96.198    Lumbar radiculopathy M54.16    Lymphedema of arm I89.0    Anemia of chronic disease D63.8    Chronic diastolic CHF (congestive heart failure) (HCC) I50.32    Hypertension I10    Glaucoma H40.9    Refusal of blood product Z78.9    Elevated troponin R77.8    Controlled type 2 diabetes mellitus with chronic kidney disease on chronic dialysis, with long-term current use of insulin ([facility]) E11.22, N18.6, Z79.4, Z99.2    Vitreous hemorrhage ([facility] H43.10    Patient is [place] Z78.9    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 (formerly [facility]) E11.649    Chronic pain syndrome G89.4    Lumbar post-laminectomy syndrome M96.1    Cervicalgia M54.2    Diabetic peripheral neuropathy ([facility]) E11.42    ESRD (end stage renal disease) (formerly [facility]) N18.6    Bilateral carpal tunnel syndrome G56.03    Cardiac arrest (formerly [facility]) I46.9    ESRD (end stage renal disease) on dialysis (formerly [facility]) N18.6, Z99.2    Mixed hyperlipidemia E78.2    Lymphedema of right upper extremity I89.0    Coronary artery disease involving native coronary artery of native heart with angina pectoris (formerly [facility]) I25.119    Mitral valve disease I05.9    CAD in native artery I25.10    Lumbar stenosis without neurogenic claudication M48.061    Intractable low back pain M54.59    Unable to ambulate R26.2    NSTEMI (non-ST elevated myocardial infarction) (formerly [facility]) I21.4    Moderate protein-calorie malnutrition ([facility]) E44.0    Ischemic cardiomyopathy I25.5    Lower abdominal pain R10.30    Pain R52    Hypotension I95.9    Vertebral osteomyelitis, chronic ([facility]) M46.20    Symptomatic sinus bradycardia R00.1       Isolation/Infection:   Isolation            No Isolation          Patient Infection Status       Infection Onset Added Last Indicated Last Indicated By Review Planned Expiration Resolved Resolved By    None active    Resolved    COVID-19 (Rule Out) 07/12/22 07/12/22 07/12/22 Respiratory Panel, Molecular, with COVID-19 (Restricted: peds pts or suitable admitted adults) (Ordered)   07/12/22 Rule-Out Test Resulted    C-diff Rule Out 06/22/22 06/22/22 06/22/22 CLOSTRIDIUM DIFFICILE EIA (Ordered)   06/23/22 Greta Grace RN    2021 Carly Armas RN 6/23/22 1843     COVID-19 06/16/22 06/16/22 06/16/22 Respiratory Panel, Molecular, with COVID-19 (Restricted: peds pts or suitable admitted adults)   22 America Jacobo RN    Per CDC guidelines patient does not need droplet plus isolation. Patient postiive for SARS-CoV-2 on 22. COVID-19 (Rule Out) 22 Respiratory Panel, Molecular, with COVID-19 (Restricted: peds pts or suitable admitted adults) (Ordered)   22 Rule-Out Test Resulted    COVID-19 (Rule Out) 06/15/22 06/15/22 06/15/22 COVID-19, Rapid (Ordered)   06/15/22 Rule-Out Test Resulted    COVID-19 22 Covid-19 Ambulatory   22     COVID-19 (Rule Out) 22 COVID-19, Rapid (Ordered)   22 Rule-Out Test Resulted    COVID-19 (Rule Out) 21 Respiratory Panel, Molecular, with COVID-19 (Restricted: peds pts or suitable admitted adults) (Ordered)   21 Rule-Out Test Resulted    COVID-19 (Rule Out) 20 Covid-19 Ambulatory (Ordered)   20 Rule-Out Test Resulted            Nurse Assessment:  Last Vital Signs: BP (!) 88/40   Pulse (!) 49   Temp 98.2 °F (36.8 °C) (Oral)   Resp (!) 32   Ht 5' 10\" (1.778 m)   Wt 145 lb 1 oz (65.8 kg)   SpO2 95%   BMI 20.81 kg/m²     Last documented pain score (0-10 scale): Pain Level: 0  Last Weight:   Wt Readings from Last 1 Encounters:   22 145 lb 1 oz (65.8 kg)     Mental Status:  oriented and alert, Confused at times     IV Access:  - None    Nursing Mobility/ADLs:  Walking   Dependent  Transfer  Dependent  Bathing  Dependent  Dressing  Dependent  Toileting  Dependent  Feeding  Dependent  Med Admin  Dependent  Med Delivery   whole    Wound Care Documentation and Therapy:  Wound 22 Buttocks Right red,open (Active)   Number of days: 70       Wound 22 Buttocks Left dry,red (Active)   Number of days: 70       Incision 22 Chest Right;Upper (Active)   Dressing Status Clean;Dry; Intact 22 0800   Incision Cleansed Cleansed with saline 07/12/22 0443   Dressing/Treatment Dry dressing; Foam 07/12/22 0800   Incision Length (cm) 2 07/11/22 1140   Incision Width (cm) 2 cm 07/11/22 1140   Incision Depth (cm) 0.5 cm 07/11/22 1140   Closure Sutures 06/27/22 1751   Drainage Amount Scant 07/12/22 0443   Drainage Description Thin;Sanguinous 07/12/22 0443   Odor None 07/12/22 0443   Number of days: 14       Incision 01/20/22 Finger (Comment which one) Left (Active)   Dressing Status Other (Comment) 06/28/22 1930   Dressing/Treatment Open to air 06/26/22 0800   Drainage Amount None 06/26/22 0800   Odor None 06/26/22 0800   Number of days: 173        Elimination:  Continence: Bowel: No  Bladder: No  Urinary Catheter: {Urinary Catheter:399441260}   Colostomy/Ileostomy/Ileal Conduit: No       Date of Last BM: 07/21/22    Intake/Output Summary (Last 24 hours) at 7/12/2022 1506  Last data filed at 7/11/2022 1830  Gross per 24 hour   Intake 868.63 ml   Output --   Net 868.63 ml     I/O last 3 completed shifts: In: 1673.7 [I.V.:1373.7]  Out: 400     Safety Concerns: At Risk for Falls    Impairments/Disabilities:      Vision    Nutrition Therapy:  Current Nutrition Therapy:   - Oral Diet:  General    Routes of Feeding: Oral  Liquids: Thin Liquids  Daily Fluid Restriction: no  Last Modified Barium Swallow with Video (Video Swallowing Test): {Done Not Done TDWB:[id number]}    Treatments at the Time of Hospital Discharge:   Respiratory Treatments: ***  Oxygen Therapy:  is not on home oxygen therapy. Ventilator:    - No ventilator support    Rehab Therapies: Physical Therapy and Occupational Therapy  Weight Bearing Status/Restrictions: No weight bearing restrictions  Other Medical Equipment (for information only, NOT a DME order):  wheelchair  Other Treatments: ***    Patient's personal belongings (please select all that are sent with patient):   Eye glasses, upper partials @ home   RN SIGNATURE:  {Esignature:[id number]}Sue Altamirano RN     CASE MANAGEMENT/SOCIAL WORK SECTION    Inpatient Status Date: 7/11/2022    Readmission Risk Assessment Score:  Readmission Risk              Risk of Unplanned Readmission:  52           Discharging to Facility/ Agency   Name: [facility]  Address:  [address], [address] [place] [address] Avenue         Phone: [phone]       Fax: [phone]          Dialysis Facility (if applicable)   Name:  Address:  Dialysis Schedule:  Phone:  Fax:    / signature: Electronically signed by Kuldeep Callejas on 7/18/22 at 11:38 AM EDT    PHYSICIAN SECTION    Prognosis: Good    Condition at Discharge: Stable    Rehab Potential (if transferring to Rehab): Fair    Recommended Labs or Other Treatments After Discharge: ***    Physician Certification: I certify the above information and transfer of Bairon Motley  is necessary for the continuing treatment of the diagnosis listed and that she requires [place] for greater 30 days.      Update Admission H&P: {CHP DME Changes in DTQLN:[id number]}    PHYSICIAN SIGNATURE:  {Esignature:[id number]}

## 2022-07-13 LAB
ALBUMIN SERPL-MCNC: 3.1 G/DL (ref 3.5–5.2)
ALP BLD-CCNC: 186 U/L (ref 35–104)
ALT SERPL-CCNC: 89 U/L (ref 0–32)
ANION GAP SERPL CALCULATED.3IONS-SCNC: 15 MMOL/L (ref 7–16)
AST SERPL-CCNC: 82 U/L (ref 0–31)
BASOPHILS ABSOLUTE: 0.01 E9/L (ref 0–0.2)
BASOPHILS RELATIVE PERCENT: 0.2 % (ref 0–2)
BILIRUB SERPL-MCNC: 0.3 MG/DL (ref 0–1.2)
BUN BLDV-MCNC: 47 MG/DL (ref 6–23)
CALCIUM SERPL-MCNC: 9.4 MG/DL (ref 8.6–10.2)
CHLORIDE BLD-SCNC: 103 MMOL/L (ref 98–107)
CO2: 19 MMOL/L (ref 22–29)
CREAT SERPL-MCNC: 4.7 MG/DL (ref 0.5–1)
EOSINOPHILS ABSOLUTE: 0.06 E9/L (ref 0.05–0.5)
EOSINOPHILS RELATIVE PERCENT: 0.9 % (ref 0–6)
GFR AFRICAN AMERICAN: 11
GFR NON-AFRICAN AMERICAN: 11 ML/MIN/1.73
GLUCOSE BLD-MCNC: 101 MG/DL (ref 74–99)
HCT VFR BLD CALC: 37.1 % (ref 34–48)
HEMOGLOBIN: 11.7 G/DL (ref 11.5–15.5)
IMMATURE GRANULOCYTES #: 0.03 E9/L
IMMATURE GRANULOCYTES %: 0.5 % (ref 0–5)
LYMPHOCYTES ABSOLUTE: 1.03 E9/L (ref 1.5–4)
LYMPHOCYTES RELATIVE PERCENT: 15.6 % (ref 20–42)
MAGNESIUM: 2.1 MG/DL (ref 1.6–2.6)
MCH RBC QN AUTO: 28.4 PG (ref 26–35)
MCHC RBC AUTO-ENTMCNC: 31.5 % (ref 32–34.5)
MCV RBC AUTO: 90 FL (ref 80–99.9)
METER GLUCOSE: 100 MG/DL (ref 74–99)
METER GLUCOSE: 125 MG/DL (ref 74–99)
METER GLUCOSE: 71 MG/DL (ref 74–99)
MONOCYTES ABSOLUTE: 0.39 E9/L (ref 0.1–0.95)
MONOCYTES RELATIVE PERCENT: 5.9 % (ref 2–12)
MRSA CULTURE ONLY: NORMAL
NEUTROPHILS ABSOLUTE: 5.08 E9/L (ref 1.8–7.3)
NEUTROPHILS RELATIVE PERCENT: 76.9 % (ref 43–80)
PDW BLD-RTO: 17.3 FL (ref 11.5–15)
PHOSPHORUS: 5.6 MG/DL (ref 2.5–4.5)
PLATELET # BLD: 122 E9/L (ref 130–450)
PMV BLD AUTO: 12.8 FL (ref 7–12)
POTASSIUM SERPL-SCNC: 4.3 MMOL/L (ref 3.5–5)
POTASSIUM SERPL-SCNC: 6.2 MMOL/L (ref 3.5–5)
RBC # BLD: 4.12 E12/L (ref 3.5–5.5)
SODIUM BLD-SCNC: 137 MMOL/L (ref 132–146)
TOTAL PROTEIN: 5.7 G/DL (ref 6.4–8.3)
WBC # BLD: 6.6 E9/L (ref 4.5–11.5)

## 2022-07-13 PROCEDURE — 2700000000 HC OXYGEN THERAPY PER DAY

## 2022-07-13 PROCEDURE — 90935 HEMODIALYSIS ONE EVALUATION: CPT

## 2022-07-13 PROCEDURE — 6370000000 HC RX 637 (ALT 250 FOR IP)

## 2022-07-13 PROCEDURE — P9047 ALBUMIN (HUMAN), 25%, 50ML: HCPCS | Performed by: INTERNAL MEDICINE

## 2022-07-13 PROCEDURE — 6370000000 HC RX 637 (ALT 250 FOR IP): Performed by: INTERNAL MEDICINE

## 2022-07-13 PROCEDURE — C9113 INJ PANTOPRAZOLE SODIUM, VIA: HCPCS

## 2022-07-13 PROCEDURE — 85025 COMPLETE CBC W/AUTO DIFF WBC: CPT

## 2022-07-13 PROCEDURE — 6360000002 HC RX W HCPCS: Performed by: INTERNAL MEDICINE

## 2022-07-13 PROCEDURE — 2000000000 HC ICU R&B

## 2022-07-13 PROCEDURE — 83735 ASSAY OF MAGNESIUM: CPT

## 2022-07-13 PROCEDURE — 84132 ASSAY OF SERUM POTASSIUM: CPT

## 2022-07-13 PROCEDURE — 2580000003 HC RX 258

## 2022-07-13 PROCEDURE — 99233 SBSQ HOSP IP/OBS HIGH 50: CPT | Performed by: INTERNAL MEDICINE

## 2022-07-13 PROCEDURE — 36415 COLL VENOUS BLD VENIPUNCTURE: CPT

## 2022-07-13 PROCEDURE — A4216 STERILE WATER/SALINE, 10 ML: HCPCS

## 2022-07-13 PROCEDURE — 80053 COMPREHEN METABOLIC PANEL: CPT

## 2022-07-13 PROCEDURE — 36592 COLLECT BLOOD FROM PICC: CPT

## 2022-07-13 PROCEDURE — 2580000003 HC RX 258: Performed by: INTERNAL MEDICINE

## 2022-07-13 PROCEDURE — 2500000003 HC RX 250 WO HCPCS: Performed by: INTERNAL MEDICINE

## 2022-07-13 PROCEDURE — 6360000002 HC RX W HCPCS

## 2022-07-13 PROCEDURE — 82962 GLUCOSE BLOOD TEST: CPT

## 2022-07-13 PROCEDURE — 99232 SBSQ HOSP IP/OBS MODERATE 35: CPT | Performed by: FAMILY MEDICINE

## 2022-07-13 PROCEDURE — 84100 ASSAY OF PHOSPHORUS: CPT

## 2022-07-13 RX ORDER — MIDODRINE HYDROCHLORIDE 5 MG/1
15 TABLET ORAL
Status: DISCONTINUED | OUTPATIENT
Start: 2022-07-13 | End: 2022-07-16

## 2022-07-13 RX ORDER — ALBUMIN (HUMAN) 12.5 G/50ML
25 SOLUTION INTRAVENOUS ONCE
Status: COMPLETED | OUTPATIENT
Start: 2022-07-13 | End: 2022-07-13

## 2022-07-13 RX ORDER — PANTOPRAZOLE SODIUM 40 MG/1
40 TABLET, DELAYED RELEASE ORAL
Status: DISCONTINUED | OUTPATIENT
Start: 2022-07-14 | End: 2022-07-22 | Stop reason: HOSPADM

## 2022-07-13 RX ADMIN — SODIUM CHLORIDE 40 MG: 9 INJECTION, SOLUTION INTRAMUSCULAR; INTRAVENOUS; SUBCUTANEOUS at 05:55

## 2022-07-13 RX ADMIN — GABAPENTIN 200 MG: 100 CAPSULE ORAL at 11:02

## 2022-07-13 RX ADMIN — DOXYCYCLINE HYCLATE 100 MG: 100 CAPSULE ORAL at 21:23

## 2022-07-13 RX ADMIN — GABAPENTIN 200 MG: 100 CAPSULE ORAL at 21:22

## 2022-07-13 RX ADMIN — NOREPINEPHRINE BITARTRATE 2 MCG/MIN: 1 INJECTION, SOLUTION, CONCENTRATE INTRAVENOUS at 13:07

## 2022-07-13 RX ADMIN — HEPARIN SODIUM 5000 UNITS: 10000 INJECTION, SOLUTION INTRAVENOUS; SUBCUTANEOUS at 13:30

## 2022-07-13 RX ADMIN — LANTHANUM CARBONATE 1000 MG: 500 TABLET, CHEWABLE ORAL at 12:12

## 2022-07-13 RX ADMIN — SODIUM CHLORIDE, PRESERVATIVE FREE 10 ML: 5 INJECTION INTRAVENOUS at 21:34

## 2022-07-13 RX ADMIN — SODIUM CHLORIDE: 9 INJECTION, SOLUTION INTRAVENOUS at 01:02

## 2022-07-13 RX ADMIN — GABAPENTIN 200 MG: 100 CAPSULE ORAL at 15:27

## 2022-07-13 RX ADMIN — ATORVASTATIN CALCIUM 80 MG: 40 TABLET, FILM COATED ORAL at 21:22

## 2022-07-13 RX ADMIN — MIDODRINE HYDROCHLORIDE 15 MG: 5 TABLET ORAL at 16:41

## 2022-07-13 RX ADMIN — MIDODRINE HYDROCHLORIDE 10 MG: 5 TABLET ORAL at 12:12

## 2022-07-13 RX ADMIN — ALBUMIN (HUMAN) 25 G: 0.25 INJECTION, SOLUTION INTRAVENOUS at 08:04

## 2022-07-13 RX ADMIN — TICAGRELOR 90 MG: 90 TABLET ORAL at 21:23

## 2022-07-13 RX ADMIN — SODIUM ZIRCONIUM CYCLOSILICATE 10 G: 10 POWDER, FOR SUSPENSION ORAL at 05:55

## 2022-07-13 RX ADMIN — TICAGRELOR 90 MG: 90 TABLET ORAL at 11:02

## 2022-07-13 RX ADMIN — MIDODRINE HYDROCHLORIDE 10 MG: 5 TABLET ORAL at 08:00

## 2022-07-13 RX ADMIN — HEPARIN SODIUM 5000 UNITS: 10000 INJECTION, SOLUTION INTRAVENOUS; SUBCUTANEOUS at 21:28

## 2022-07-13 RX ADMIN — BRIMONIDINE TARTRATE 1 DROP: 2 SOLUTION OPHTHALMIC at 11:03

## 2022-07-13 RX ADMIN — VANCOMYCIN HYDROCHLORIDE 1000 MG: 1 INJECTION, POWDER, LYOPHILIZED, FOR SOLUTION INTRAVENOUS at 12:07

## 2022-07-13 RX ADMIN — DOXYCYCLINE HYCLATE 100 MG: 100 CAPSULE ORAL at 11:02

## 2022-07-13 RX ADMIN — ASPIRIN 81 MG: 81 TABLET, COATED ORAL at 11:02

## 2022-07-13 RX ADMIN — ALLOPURINOL 100 MG: 100 TABLET ORAL at 11:02

## 2022-07-13 RX ADMIN — LANTHANUM CARBONATE 1000 MG: 500 TABLET, CHEWABLE ORAL at 16:41

## 2022-07-13 ASSESSMENT — ENCOUNTER SYMPTOMS
VOICE CHANGE: 0
SHORTNESS OF BREATH: 0
PHOTOPHOBIA: 0
VOMITING: 0
COUGH: 0
DIARRHEA: 0
TROUBLE SWALLOWING: 0
BACK PAIN: 1
ABDOMINAL PAIN: 1
NAUSEA: 0

## 2022-07-13 ASSESSMENT — PAIN SCALES - GENERAL
PAINLEVEL_OUTOF10: 0

## 2022-07-13 NOTE — FLOWSHEET NOTE
07/13/22 1118   Vital Signs   BP (!) 129/52   Temp 98 °F (36.7 °C)   Heart Rate 90   Resp 18   Weight 144 lb 13.5 oz (65.7 kg)   Weight Method Bed scale   Percent Weight Change -0.15   Pain Assessment   Pain Assessment None - Denies Pain   Post-Hemodialysis Assessment   Post-Treatment Procedures Blood returned; Access bleeding time < 10 minutes   Machine Disinfection Process Acid/Vinegar Clean;Exterior Machine Disinfection; Heat Disinfect   Rinseback Volume (ml) 300 ml   Blood Volume Processed (Liters) 78 l/min   Dialyzer Clearance Moderately streaked   Duration of Treatment (minutes) 210 minutes   Hemodialysis Intake (ml) 300 ml   Hemodialysis Output (ml) 400 ml   NET Removed (ml) 100   Tolerated Treatment Good   Patient Response to Treatment Tolerated tx well   Bilateral Breath Sounds Diminished;Clear   Edema Generalized

## 2022-07-13 NOTE — H&P
Critical Care Progress Note         Patient Michele Thrasher   MRN -  [id number]   Acct # - [de-identified]   - 1955      Date of Admission -  2022 12:11 AM  Date of evaluation -  2022  [address] Day - 2    ADMIT/CONSULT DETAILS     Reason for Admit/Consult   Hypotension and bradycardia in setting of EKG changes  [address] [facility],*  Primary Care Physician - Gianna Heller MD       2022: Patient with persistent bradycardia, on O2 for momentary drop of SpO2 to 85%, currently on Dopamine 5, episode of confusion likely with episodes of hypotension     2022: Patient remains in the 50's despite dopamine, BP remains stable at this time. Patient with no acute distress, reports that she feels improved from her previous. Denies any chest pain at this time. HPI   The patient is a 78 y.o. female with significant past medical history of ESRD on MWF dialysis, Anemia of CKD, Hyperparathyroidism, HTN, DM, CAD, Breast cancer, Osteomyelitis/discitis, spinal fusion, glaucoma. Patient with cardiac catherization showing RCA stenosis in stent with balloon angioplasty, patent LAD stent and total occlusion of Left Circumflex on 2022. Patient presented to the ER with complaints of hypotension after being given 2 percocet at [facility] where patient resides. Patient received 3 doses of midodrine prior to arrival with report of no change. BP on arrival 80/49 in ED, with bradycardia of 47. Patient reporting fatigue and generalized malaise similar to previous episodes following dialysis when patient had \"too much fluid off\", which she reports has improved with fluid replacement. Patient with MWF dialysis at [facility] through AV fistula on left. Patient was given fluids, noted to have improvement.  Patient initially admitted and was started on dialysis when Dr. Temi Nair noted bradycardia and hypotension with EKG changes and decided to discontinue dialysis with decision to transfer patient to ICU for cardiac evaluation. Past Medical History         Diagnosis Date    Acute infection of bone ([address]     infection of rt foot, resolved.     Acute osteomyelitis of phalanx of left hand ([address] 1/27/2022    Left third distal phalanx    Anemia of chronic disease     Arthritis     Breast cancer ([address]     right breast, 2008/ bladder, 2006- last chemotherapy \"years ago\"    CAD (coronary artery disease)     Carpal tunnel syndrome     bilat - for OR left hand 3-17-20     Chronic diastolic CHF (congestive heart failure) ([address] 09/23/2014 9/23/14- echocardiogram revealed moderate LV concentric hypertrophy, stage III diastolic dysfunction, mild tricuspid regurgitation    CKD (chronic kidney disease) stage 4, GFR 15-29 ml/min ([facility])     Diabetic retinopathy ([address]     Glaucoma     Hemodialysis patient ([address]     [facility]- Dr. Lisa Chery - left arm fistula     Hyperkalemia, diminished renal excretion 11/9/2017    Hyperlipidemia     Hypertension     Hypoglycemia unawareness in type 1 diabetes mellitus ([address] 11/7/2017    Insulin dependent type 2 diabetes mellitus ([address]     Neuropathy     feet    Osteomyelitis due to secondary diabetes ([address]     rt great toe with amputation    Patient is Hoahaoism 11/7/2017    Refusal of blood product     patient states she dose not take blood transfusion    Ventricular hypertrophy     Ventricular tachycardia ([address] 5/24/2021    Vitreous hemorrhage ([address]     left eye        Past Surgical History           Procedure Laterality Date    AMPUTATION      right great toe    ANKLE SURGERY      correction on charcot joint of right ankle    BONE BIOPSY N/A 04/18/2022    BONE BIOPSY PERCUTANEOUS L1/L2 performed by Lauri Appiah MD at [address]  12/09/2021    Dr Courtney Stokes Left 03/17/2020    LEFT CARPAL TUNNEL RELEASE performed by Renetta Heath Meenakshi Hussein MD at [address] [facility]      bilateral    CHOLECYSTECTOMY      COLONOSCOPY      CORONARY ANGIOPLASTY  05/05/2022    Dr. Sofya Ellis - RCA angioplasty   Crista Herbierto Left 01/31/2018    upper arm/Dr. Weston Newton    ECHO COMPL W DOP COLOR FLOW  02/14/2013         ECHO COMPLETE  09/17/2013         FINGER AMPUTATION Left 01/20/2022    AMPUTATION OF LEFT THIRD DIGIT, DISTAL PHALANX performed by Cortes Magallanes MD at [address] [facility] [address]      right    OTHER SURGICAL HISTORY  09/27/2011    PPV, membranectomy, laser Right eye    OTHER SURGICAL HISTORY  insertion lumbar drain insertion    10/12/`14    OTHER SURGICAL HISTORY  10/22/2015    percutaneous lead placement for spinal cord stimulator    OTHER SURGICAL HISTORY  11/03/2015    Spinal; cord stimulator- turned off as of 3-10-20     PORT SURGERY N/A 6/27/2022    PORT REMOVAL performed by Jorje Johnson MD at [address] AV ANAST,UP ARM BASILIC VEIN TRANSPOSIT Left 05/15/2018    TRANSPOSITION STAGE II AV FISTULA - LEFT UPPER ARM performed by Una Andrew MD at [address] [facility] ANGIOACCESS AV FISTULA Left 09/25/2018    SUPERFICIALIZATION AV FISTULA - LEFT ARM performed by Una Andrew MD at [address] [facility] W/VITRECTOMY ANY METH Left 04/10/2018    PARS PLANA VITRECTOMY 25 GAUGE RETINAL DETACHMENT REPAIR air fluid exchange, endolaser performed by Ludy Sanchez MD at [address] TRANSESOPHAGEAL ECHOCARDIOGRAM  11/11/2021    Dr. Yovani High TRANSESOPHAGEAL ECHOCARDIOGRAM  04/19/2022        TUNNELED VENOUS CATHETER PLACEMENT  11/15/2017    VITRECTOMY Left 04/10/2018    PARS PLANA VITRECTOMY; RETINAL DETACHMENT REPAIR; GAS BUBBLE; LASER LEFT EYE       Current Medications   Current Medications    [START ON 7/14/2022] pantoprazole  40 mg Oral QAM AC    latanoprost  1 drop Both Eyes Daily    brimonidine  1 drop Right Eye BID    vancomycin 1,000 mg IntraVENous Q MWF    allopurinol  100 mg Oral Daily    atorvastatin  80 mg Oral Nightly    doxycycline hyclate  100 mg Oral 2 times per day    gabapentin  200 mg Oral TID    ticagrelor  90 mg Oral BID    sodium chloride flush  5-40 mL IntraVENous 2 times per day    heparin (porcine)  5,000 Units SubCUTAneous 3 times per day    aspirin  81 mg Oral Daily    insulin lispro  0-6 Units SubCUTAneous TID     insulin lispro  0-3 Units SubCUTAneous Nightly    lanthanum  1,000 mg Oral TID     insulin glargine  5 Units SubCUTAneous Nightly    sodium chloride  500 mL IntraVENous Once    midodrine  10 mg Oral TID      sodium chloride flush, sodium chloride, ondansetron **OR** ondansetron, polyethylene glycol, acetaminophen **OR** acetaminophen, glucose, dextrose bolus **OR** dextrose bolus, glucagon (rDNA), dextrose  IV Drips/Infusions   norepinephrine      sodium chloride 75 mL/hr at 07/13/22 0102    sodium chloride      dextrose      DOPamine 10 mcg/kg/min (07/13/22 0803)     Home Medications  Medications Prior to Admission: acetaminophen (TYLENOL) 325 MG tablet, Take 650 mg by mouth every 4 hours as needed for Pain  Acidophilus Lactobacillus CAPS, Take 1 capsule by mouth every morning  aspirin 81 MG EC tablet, Take 81 mg by mouth every morning  meclizine (ANTIVERT) 12.5 MG tablet, Take 12.5 mg by mouth every 8 hours as needed for Dizziness  mirtazapine (REMERON) 7.5 MG tablet, Take 7.5 mg by mouth nightly  oxyCODONE-acetaminophen (PERCOCET) 5-325 MG per tablet, Take 2 tablets by mouth every 8 hours as needed for Pain.  prochlorperazine (COMPAZINE) 5 MG tablet, Take 5 mg by mouth every 6 hours as needed (NAUSEA AND VOMITING)  sevelamer (RENVELA) 800 MG tablet, Take 1 tablet by mouth 3 times daily (with meals)  ZINC OXIDE, TOPICAL, (SECURA PROTECTIVE) 10 % CREA, Apply 1 Dose topically 2 times daily -BUTTOCKS-  Vancomycin HCl in Dextrose (VANCOCIN HCL IV), Infuse 1,000 mg intravenously every 72 hours **MON-WED-FRI** -SEND TO DIALYSIS W/ PT-  doxycycline hyclate (VIBRAMYCIN) 100 MG capsule, Take 1 capsule by mouth every 12 hours  midodrine (PROAMATINE) 5 MG tablet, Take 5 mg by mouth daily as needed (SBP <100. COURTNEY REPEAT X 1 DURING DIALYSIS)   gabapentin (NEURONTIN) 100 MG capsule, Take 2 capsules by mouth 3 times daily for 180 days. atorvastatin (LIPITOR) 80 MG tablet, Take 80 mg by mouth nightly  bumetanide (BUMEX) 2 MG tablet, Take 2 mg by mouth three times a week **SUN-TUE-THURS**  isosorbide mononitrate (IMDUR) 30 MG extended release tablet, Take 30 mg by mouth daily  insulin glargine (LANTUS) 100 UNIT/ML injection vial, Inject 5 Units into the skin nightly   lidocaine-prilocaine (EMLA) 2.5-2.5 % cream, Apply 1 Dose topically three times a week **MON-WED-FRI** TO THE SHUNT SITE  metoprolol succinate (TOPROL XL) 25 MG extended release tablet, Take 1 tablet by mouth daily  lanthanum (FOSRENOL) 1000 MG chewable tablet, Take 1,000 mg by mouth 3 times daily (with meals)   allopurinol (ZYLOPRIM) 100 MG tablet, Take 1 tablet by mouth daily  ticagrelor (BRILINTA) 90 MG TABS tablet, Take 1 tablet by mouth 2 times daily  B Complex-C-Folic Acid (BONI CAPS) 1 MG CAPS, Take 1 mg by mouth nightly   omeprazole (PRILOSEC) 20 MG delayed release capsule, Take 20 mg by mouth daily as needed (FOR GI UPSET)   LUMIGAN 0.01 % SOLN ophthalmic drops, Place 1 drop into the right eye nightly   COMBIGAN 0.2-0.5 % ophthalmic solution, Place 1 drop into the right eye every 12 hours   Diet/Nutrition   ADULT DIET; Regular; 4 carb choices (60 gm/meal)  Allergies   Furosemide  Social History   Tobacco   reports that she has never smoked. She has never used smokeless tobacco.    Alcohol     reports no history of alcohol use.     Occupational history :    Family History         Problem Relation Age of Onset   Myron Ritchie Breast Cancer Mother 61    Hypertension Mother     Heart Disease Father     Prostate Cancer Father     Breast Cancer Maternal Grandmother 60       Sleep History   n/a    ROS     REVIEW OF SYSTEMS:  Review of Systems   Constitutional: Positive for fatigue. Negative for chills and fever. HENT: Negative for trouble swallowing and voice change. Eyes: Negative for photophobia and visual disturbance. Respiratory: Negative for cough and shortness of breath. Cardiovascular: Negative for chest pain and palpitations. Gastrointestinal: Positive for abdominal pain. Negative for diarrhea, nausea and vomiting. Genitourinary: Negative for dysuria, frequency and urgency. Musculoskeletal: Positive for back pain. Negative for neck pain and neck stiffness. Skin: Negative for rash and wound. Neurological: Positive for dizziness (improved). Negative for weakness and light-headedness. Psychiatric/Behavioral: Negative for behavioral problems and confusion. The patient is nervous/anxious. Lines and Devices   Left HD fistula  Implanted Venous port  Right PIV    Mechanical Ventilation Data        Additional Respiratory Assessments  Heart Rate: 87  Resp: 16  SpO2: 92 %    ABG  Lab Results   Component Value Date/Time    PH 7.390 06/15/2022 09:39 PM    PCO2 46.6 06/15/2022 09:39 PM    PO2 104.6 06/15/2022 09:39 PM    HCO3 27.6 06/15/2022 09:39 PM    O2SAT 98.6 06/15/2022 09:39 PM     Lab Results   Component Value Date/Time    MODE 8L breathing tx 06/15/2022 09:39 PM     Vitals    height is 5' 10\" (1.778 m) and weight is 145 lb 1 oz (65.8 kg). Her oral temperature is 98.1 °F (36.7 °C). Her blood pressure is 113/65 and her pulse is 87. Her respiration is 16 and oxygen saturation is 92%.        Temperature Range: Temp: 98.1 °F (36.7 °C) Temp  Av.4 °F (36.9 °C)  Min: 97.8 °F (36.6 °C)  Max: 98.7 °F (37.1 °C)  BP Range:  Systolic (20ILK), IWT:603 , Min:77 , YFO:545     Diastolic (72DNH), WXY:62, Min:35, Max:65    Pulse Range: Pulse  Av.4  Min: 46  Max: 91  Respiration Range: Resp  Av.5  Min: 14  Max: 39  Current Pulse Ox[de-identified]  SpO2: 92 %  24HR Pulse Ox Range:  SpO2  Av.3 %  Min: 85 %  Max: 94 %  Oxygen Amount and Delivery: O2 Flow Rate (L/min): 4 L/min      I/O (24 Hours)    Patient Vitals for the past 8 hrs:   BP Temp Temp src Pulse Resp SpO2 Weight   22 1030 113/65 -- -- 87 -- -- --   22 1015 (!) 106/45 -- -- 88 -- -- --   22 1000 (!) 103/44 -- -- 88 -- -- --   22 0945 (!) 115/50 -- -- 91 -- -- --   2230 (!) 134/52 -- -- 89 -- -- --   2215 (!) 105/43 -- -- 88 -- -- --   22 09 (!) 99/48 -- -- 90 -- -- --   22 0845 (!) 90/40 -- -- 82 -- -- --   22 0833 (!) 94/42 -- -- 77 16 -- --   22 0815 (!) 85/36 -- -- 70 -- -- --   22 08 (!) 81/35 98.1 °F (36.7 °C) Oral 60 -- -- --   2245 (!) 77/41 -- -- 53 -- -- --   2230 (!) 93/41 -- -- (!) 49 -- -- --   22 0725 (!) 115/46 98 °F (36.7 °C) -- 50 18 -- 145 lb 1 oz (65.8 kg)   22 07 (!) 83/44 -- -- 54 18 92 % --   22 0657 -- -- -- -- -- (!) 85 % --   22 06 (!) 98/45 98.4 °F (36.9 °C) Axillary 59 27 91 % --   22 0500 (!) 101/44 -- -- 57 14 -- --   22 0400 (!) 10144 98.6 °F (37 °C) Axillary 57 14 93 % --   22 0300 (!) 101/50 -- -- 57 19 -- --     No intake or output data in the 24 hours ending 22 1059  No intake/output data recorded. Patient Vitals for the past 96 hrs (Last 3 readings):   Weight   22 0725 145 lb 1 oz (65.8 kg)   22 1140 145 lb 1 oz (65.8 kg)   22 0033 134 lb (60.8 kg)         Drains/Tubes Outputs      Exam   Physical Exam  Vitals reviewed. Constitutional:       General: She is not in acute distress. Appearance: Normal appearance. HENT:      Head: Normocephalic. Right Ear: External ear normal.      Left Ear: External ear normal.      Nose: Nose normal.      Mouth/Throat:      Pharynx: Oropharynx is clear. Eyes:      General:         Right eye: No discharge. Left eye: No discharge. Conjunctiva/sclera: Conjunctivae normal.   Cardiovascular:      Rate and Rhythm: Regular rhythm. Bradycardia present. Heart sounds: No friction rub. No gallop. Pulmonary:      Effort: Pulmonary effort is normal. No respiratory distress. Breath sounds: No stridor. No rhonchi or rales. Abdominal:      General: There is no distension. Palpations: Abdomen is soft. Tenderness: There is abdominal tenderness. There is no guarding or rebound. Musculoskeletal:         General: Tenderness present. No deformity. Cervical back: Normal range of motion and neck supple. No rigidity or tenderness. Right lower leg: No edema. Left lower leg: No edema. Skin:     General: Skin is warm. Coloration: Skin is not jaundiced. Findings: No erythema. Neurological:      Mental Status: She is alert and oriented to person, place, and time. Sensory: No sensory deficit. Motor: No weakness.    Psychiatric:         Mood and Affect: Mood normal.         Behavior: Behavior normal.       Data   Old records and images have been reviewed    Lab Results   CBC     Lab Results   Component Value Date/Time    WBC 6.6 07/13/2022 06:01 AM    RBC 4.12 07/13/2022 06:01 AM    HGB 11.7 07/13/2022 06:01 AM    HCT 37.1 07/13/2022 06:01 AM     07/13/2022 06:01 AM    MCV 90.0 07/13/2022 06:01 AM    MCH 28.4 07/13/2022 06:01 AM    MCHC 31.5 07/13/2022 06:01 AM    RDW 17.3 07/13/2022 06:01 AM    NRBC 0.0 02/18/2017 04:00 AM    SEGSPCT 70 03/12/2014 10:53 AM    BANDSPCT 1 02/18/2015 10:35 AM    LYMPHOPCT 15.6 07/13/2022 06:01 AM    PROMYELOPCT 1.8 02/18/2017 04:00 AM    MONOPCT 5.9 07/13/2022 06:01 AM    MYELOPCT 0.9 10/24/2017 10:45 AM    BASOPCT 0.2 07/13/2022 06:01 AM    MONOSABS 0.39 07/13/2022 06:01 AM    LYMPHSABS 1.03 07/13/2022 06:01 AM    EOSABS 0.06 07/13/2022 06:01 AM    BASOSABS 0.01 07/13/2022 06:01 AM       BMP   Lab Results   Component Value Date/Time     07/13/2022 06:01 AM    K 6.2 07/13/2022 06:01 AM    K 4.7 07/11/2022 12:46 AM     07/13/2022 06:01 AM    CO2 19 07/13/2022 06:01 AM    BUN 47 07/13/2022 06:01 AM    CREATININE 4.7 07/13/2022 06:01 AM    GLUCOSE 101 07/13/2022 06:01 AM    GLUCOSE 46 04/02/2012 11:00 AM    CALCIUM 9.4 07/13/2022 06:01 AM       LFTS  Lab Results   Component Value Date/Time    ALKPHOS 186 07/13/2022 06:01 AM    ALT 89 07/13/2022 06:01 AM    AST 82 07/13/2022 06:01 AM    PROT 5.7 07/13/2022 06:01 AM    BILITOT 0.3 07/13/2022 06:01 AM    BILIDIR <0.2 05/25/2021 05:20 AM    IBILI see below 05/25/2021 05:20 AM    LABALBU 3.1 07/13/2022 06:01 AM    LABALBU 3.8 04/02/2012 11:00 AM       INR  No results for input(s): PROTIME, INR in the last 72 hours. APTT  No results for input(s): APTT in the last 72 hours. Lactic Acid  Lab Results   Component Value Date/Time    LACTA 1.0 07/11/2022 12:46 AM    LACTA 1.0 06/15/2022 06:53 PM    LACTA 0.8 11/09/2017 12:20 PM        BNP   No results for input(s): BNP in the last 72 hours. Cultures     Recent Labs     07/11/22  1405   BC 24 Hours no growth     Recent Labs     07/11/22  1405   BLOODCULT2 24 Hours no growth     No results for input(s): LABURIN in the last 72 hours. Radiology     XR CHEST PORTABLE   Final Result   No acute process.              SYSTEMS ASSESSMENT    Neuro   Alert, Oriented, able to answer questions without difficulty  Continue to monitor mentation     Respiratory   Patient arrived on NC  Denies any shortness of breath  Replaced on NC after episode of SpO2<90% overnight  Keep O2 sat 90-92%    Cardiovascular   Hypotension  On NS infusion of 75cc/hr  On midodrine TID, increase to 15 per Nephrology   Previous ECHO with EF of 40-45% in 4/2022  Dialysis associated hypotensive episode, given midodrine, increased dopamine and albumin  Will change dopamine to levophed  Will continue to monitor     Bradycardia  Denies any chest pain  HR in 50's despite dopamine  Will change to Levophed  Concern for AutoNomic Dysfunction  EKG changes noted including 1st degree block  Previous Cardiac catherization with balloon angioplasty within previous stent in RCA noted on 5/4/2022, additional complete occlusion of LCx at that time. On ASA and Ticragrelor at home, will continue  D/C timolol   Cardiology consulted, appreciate input      Gastrointestinal   Reports of previous diarrhea, nausea, vomiting > 1 week ago  Monitor for any diarrhea or blood in stool    Renal   ESRD on Dialysis MWF  Dialysis today  Nephrology following        Infectious Disease   Blood cultures and urine cultures sent  Afebrile at this time     Chronic Vertebral Osteomyelitis   Continue home Vancocin 1000 mg 3x, doxycycline 100 mg BID  Hematology/Oncology   Monitor for any changes to Hgb  bleeding near central line site resolved this morning after heparin held and sandbag placed  Hgb 11.7, will continue to monitor  NO BLOOD PRODUCTS as patient is a Hindu. Endocrine   T2DM   Monitor glucose    Social/Spiritual/DNR/Other   Full Code per records  Patient from nursing facility, [address]  Emergency Medicine PGY-2  10:59 AM  07/13/22 7/13/2022  10:59 AM     Critical Care Attending Addendum:    Patient seen and examined with the house staff. X-rays personally reviewed through the PACS. Family is updated at the bedside as available. Additional findings listed below as necessary. Additional comments:  1. Hypotension and bradycardia still requiring pressors though dopamine is less, will try to wean and if not, will transition to norepi as dopamine is not effecting heart rate. Midodrine increased. 2. ESRD received HD today and ran even as d/w Dr. Cristel Kamara. 3. Mentation intact. 4. Vertebral osteo on iv vanco and suppressive abx.  5. Femoral line site bleeding has stopped.     >30 min CCT

## 2022-07-13 NOTE — PLAN OF CARE
Problem: ABCDS Injury Assessment  Goal: Absence of physical injury  Outcome: Progressing     Problem: Skin/Tissue Integrity  Goal: Absence of new skin breakdown  Description: 1. Monitor for areas of redness and/or skin breakdown  2. Assess vascular access sites hourly  3. Every 4-6 hours minimum:  Change oxygen saturation probe site  4. Every 4-6 hours:  If on nasal continuous positive airway pressure, respiratory therapy assess nares and determine need for appliance change or resting period.   Outcome: Progressing     Problem: Discharge Planning  Goal: Discharge to home or other facility with appropriate resources  Outcome: Not Progressing     Problem: Safety - Adult  Goal: Free from fall injury  Outcome: Progressing     Problem: Chronic Conditions and Co-morbidities  Goal: Patient's chronic conditions and co-morbidity symptoms are monitored and maintained or improved  Outcome: Not Progressing

## 2022-07-13 NOTE — PROGRESS NOTES
I met with the patient to discuss her Taoism and spiritual needs. During our conversation I confirmed with her that she is a Zoroastrianism and that because of her Taoism beliefs she does not want to receive any blood products as a part of her care and treatment. Additionally, she informed me that she has been in contact with her vitaliy community and they are providing her with the support she needs.

## 2022-07-13 NOTE — PATIENT CARE CONFERENCE
Intensive Care Daily Quality Rounding Checklist        ICU Team Members: Dr. Anne Desai; Dr. Fernando Maher (Resident);  Clinical Pharmacist; Charge Nurse; Bedside Nurse; RT          ICU Day #: 3     Intubation Date: N/A     Ventilator Day #: N/A     Central Line Insertion Date: July 11                                                    Day #: NUMBER: 3      Arterial Line Insertion Date: N/A                             Day #: N/A     Temporary Hemodialysis Catheter Insertion Date: Tessio LUE                             Day #: N/A     DVT Prophylaxis: Heparin SQ    GI Prophylaxis: Protonix     Gatn Catheter Insertion Date: n/a                                        Day #: n/a                             Continued need (if yes, reason documented and discussed with physician): n/a     Skin Issues/ Wounds and ordered treatment discussed on rounds: Previous Mediport site/ finger tip amputation healed     Goals/ Plans for the Day: Daily labs, HD per Nephrology, HR management per Cardiology, wean Dopamine drip as able

## 2022-07-13 NOTE — PROGRESS NOTES
[address]  Progress Note    Chief complaint :  Chief Complaint   Patient presents with    Hypotension     given 2 percocet and 3 doses of midodrine       Subjective:    Patient states that she is feeling \"good\" this morning. She says she has some abdominal pain and leg pain but that her nausea has improved since yesterday. She denies fever, chills, lightheadedness, headache, chest pain, shortness of breath, nausea/vomiting, constipation/diarrhea. Per nurse, no acute overnight events    Past medical, surgical, family and social history were reviewed, non-contributory, and unchanged unless otherwise stated. ROS negative except as stated above. Objective:  BP (!) 117/55   Pulse 54   Temp 98.6 °F (37 °C) (Axillary)   Resp 25   Ht 5' 10\" (1.778 m)   Wt 144 lb 13.5 oz (65.7 kg)   SpO2 97%   BMI 20.78 kg/m²     Physical Exam  Constitutional:       Appearance: Normal appearance. HENT:      Head: Normocephalic and atraumatic. Right Ear: External ear normal.      Left Ear: External ear normal.      Nose: Nose normal.      Mouth/Throat:      Pharynx: No oropharyngeal exudate or posterior oropharyngeal erythema. Eyes:      General: No scleral icterus. Right eye: No discharge. Left eye: No discharge. Extraocular Movements: Extraocular movements intact. Conjunctiva/sclera: Conjunctivae normal.      Pupils: Pupils are equal, round, and reactive to light. Cardiovascular:      Rate and Rhythm: Regular rhythm. Bradycardia present. Heart sounds: Normal heart sounds. No murmur heard. No friction rub. No gallop. Pulmonary:      Effort: Pulmonary effort is normal.      Breath sounds: Normal breath sounds. No wheezing, rhonchi or rales. Chest:      Chest wall: No tenderness. Abdominal:      General: Abdomen is flat. There is no distension. Palpations: Abdomen is soft. There is no mass. Tenderness:  There is abdominal tenderness (minimal, diffuse). There is no guarding or rebound. Musculoskeletal:      Cervical back: Normal range of motion. Skin:     General: Skin is warm and dry. Neurological:      General: No focal deficit present. Mental Status: She is alert and oriented to person, place, and time. Psychiatric:         Mood and Affect: Mood normal.         Behavior: Behavior normal.         Thought Content:  Thought content normal.      R femoral CVC in place (Insertion date July 11)    Labs:  Recent Results (from the past 24 hour(s))   POCT Glucose    Collection Time: 07/13/22 12:10 PM   Result Value Ref Range    Meter Glucose 71 (L) 74 - 99 mg/dL   Potassium    Collection Time: 07/13/22  3:24 PM   Result Value Ref Range    Potassium 4.3 3.5 - 5.0 mmol/L   POCT Glucose    Collection Time: 07/13/22  4:41 PM   Result Value Ref Range    Meter Glucose 125 (H) 74 - 99 mg/dL   POCT Glucose    Collection Time: 07/13/22  9:21 PM   Result Value Ref Range    Meter Glucose 100 (H) 74 - 99 mg/dL   CBC with Auto Differential    Collection Time: 07/14/22  5:07 AM   Result Value Ref Range    WBC 4.2 (L) 4.5 - 11.5 E9/L    RBC 3.19 (L) 3.50 - 5.50 E12/L    Hemoglobin 8.9 (L) 11.5 - 15.5 g/dL    Hematocrit 28.5 (L) 34.0 - 48.0 %    MCV 89.3 80.0 - 99.9 fL    MCH 27.9 26.0 - 35.0 pg    MCHC 31.2 (L) 32.0 - 34.5 %    RDW 17.2 (H) 11.5 - 15.0 fL    Platelets 79 (L) 239 - 450 E9/L    MPV NOT CALC 7.0 - 12.0 fL    Neutrophils % 64.2 43.0 - 80.0 %    Immature Granulocytes % 0.5 0.0 - 5.0 %    Lymphocytes % 21.9 20.0 - 42.0 %    Monocytes % 10.8 2.0 - 12.0 %    Eosinophils % 2.1 0.0 - 6.0 %    Basophils % 0.5 0.0 - 2.0 %    Neutrophils Absolute 2.72 1.80 - 7.30 E9/L    Immature Granulocytes # 0.02 E9/L    Lymphocytes Absolute 0.93 (L) 1.50 - 4.00 E9/L    Monocytes Absolute 0.46 0.10 - 0.95 E9/L    Eosinophils Absolute 0.09 0.05 - 0.50 E9/L    Basophils Absolute 0.02 0.00 - 0.20 E9/L   Phosphorus    Collection Time: 07/14/22  5:07 AM   Result Value Levophed drip  - received 25 g Albumin on 7/13  - Blood cx x2 negative  - Procal 0.42  - CRP 1.4  - Appreciate ICU management    Bradycardia  - On telemetry  - Continue to hold beta-blockers  - Cardiology following with plans to assess for chronotropic incompetence when able    HFrEF  - Echo in 5/5/2022 shows EF 40-45% and mild mitral regurgitation  - Hold home Imdur 30 mg QD due to low BP  - Hold home Bumex 2 mg Sun-Tue-Thu  - Hold home Toprol 25 mg QD  - Cardiology following  - Appreciate ICU management    ESRD on HD  - HD schedule is MWF.  Patient is able to make some urine  - Continue home Lanthanum 1000 mg three times per week  - UA and urine cx ordered  - Nephrology following    Hyperkalemia  5.6>6.2>4.3  - Lokelma 10 g on 7/12 and 7/13  - Dialysis yesterday (7/13)  - Nephrology following    CAD s/p stent placement  - Recent cardiac catheterization 5/2022 that revealed in-stent stenosis of mid RCA with successful balloon angioplasty, patent stent in the LAD, totally occluded LCA  - EKG changes on this admission showing new 1st degree AV block  - Continue home ASA 81 mg QD  - Continue home Ticagrelor 90 mg BID  - ICU management    Dysphagia  - Recommend bedside swallow study    Chronic Vertebral Osteomyelitis  - Continue home vancomycin 1000 mg three times per week  - Continue home doxycycline 100 mg BID  - ICU management    Hyperuricemia  - Continue home Allopurinol 100 mg BID     Hyperlipidemia  - Continue home Lipitor 80 mg nightly    Type II Diabetes Mellitus  - Continue home Lantus 5U nightly  - Hypoglycemia protocol      DVT ppx: Heparin  GI ppx: Protonix  Code Status: Full      Aga Cardona MD   Family Medicine Resident PGY-1  07/14/22   7:02 AM

## 2022-07-13 NOTE — PROGRESS NOTES
INPATIENT CARDIOLOGY FOLLOW-UP    Name: Mason Canales    Age: 78 y.o. Date of Admission: 7/11/2022 12:11 AM    Date of Service: 7/13/2022    Primary Cardiologist: Dr. Agus Loredo    Chief Complaint: Follow-up for symptomatic bradycardia    Interim History:  No new overnight cardiac complaints. Currently with no complaints of CP, SOB, palpitations, dizziness, or lightheadedness. Sinus bradycardia on telemetry. Rates have been in the 45s. No evidence of high-grade AV block. Was on dopamine 2.5 initially. Pressures responded but heart rates remained in the 40s. Now dopamine has been increased to 10. Heart rates have improved. Undergoing dialysis today.     Review of Systems:   Negative except as described above    Problem List:  Patient Active Problem List   Diagnosis    Diabetic retinopathy ([address]    Malignant neoplasm of right female breast ([address]    Atherosclerosis of native coronary artery of native heart without angina pectoris    Moderate obesity    Left ventricular hypertrophy    Herniated lumbar intervertebral disc    Lumbar degenerative disc disease    Pseudomeningocele    Lumbar radiculopathy    Lymphedema of arm    Anemia of chronic disease    Chronic diastolic CHF (congestive heart failure) ([address]    Hypertension    Glaucoma    Refusal of blood product    Elevated troponin    Controlled type 2 diabetes mellitus with chronic kidney disease on chronic dialysis, with long-term current use of insulin (HCC)    Vitreous hemorrhage ([address]    Patient is Druze    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 ([address]    Chronic pain syndrome    Lumbar post-laminectomy syndrome    Cervicalgia    Diabetic peripheral neuropathy ([address]    ESRD (end stage renal disease) ([address]    Bilateral carpal tunnel syndrome    Cardiac arrest ([address]    ESRD (end stage renal disease) on dialysis ([address]    Mixed hyperlipidemia    Lymphedema of right upper extremity    Coronary artery disease involving native coronary artery of native heart with angina pectoris ([facility]    Mitral valve disease    CAD in native artery    Lumbar stenosis without neurogenic claudication    Intractable low back pain    Unable to ambulate    NSTEMI (non-ST elevated myocardial infarction) (HCC)    Moderate protein-calorie malnutrition (HCC)    Ischemic cardiomyopathy    Lower abdominal pain    Pain    Hypotension    Vertebral osteomyelitis, chronic (HCC)    Symptomatic sinus bradycardia       Current Medications:    Current Facility-Administered Medications:     [START ON 7/14/2022] pantoprazole (PROTONIX) tablet 40 mg, 40 mg, Oral, QAM AC, Remington Veras MD    norepinephrine (LEVOPHED) 16 mg in dextrose 5 % 250 mL infusion, 1-100 mcg/min, IntraVENous, Continuous, Remington Veras MD    latanoprost (XALATAN) 0.005 % ophthalmic solution 1 drop, 1 drop, Both Eyes, Daily, Gilson Carrrea MD, 1 drop at 07/12/22 2051    brimonidine (ALPHAGAN) 0.2 % ophthalmic solution 1 drop, 1 drop, Right Eye, BID, 1 drop at 07/13/22 1103 **AND** [DISCONTINUED] timolol (TIMOPTIC) 0.5 % ophthalmic solution 1 drop, 1 drop, Right Eye, BID, Alek River MD, 1 drop at 07/12/22 1252    vancomycin 1000 mg IVPB in 250 mL D5W addavial, 1,000 mg, IntraVENous, Q MWF, Fanny Elizondo MD    0.9 % sodium chloride infusion, , IntraVENous, Continuous, Fanny Elizondo MD, Last Rate: 75 mL/hr at 07/13/22 0102, New Bag at 07/13/22 0102    allopurinol (ZYLOPRIM) tablet 100 mg, 100 mg, Oral, Daily, Fanny Elizondo MD, 100 mg at 07/13/22 1102    atorvastatin (LIPITOR) tablet 80 mg, 80 mg, Oral, Nightly, Fanny Elizondo MD, 80 mg at 07/12/22 2051    doxycycline hyclate (VIBRAMYCIN) capsule 100 mg, 100 mg, Oral, 2 times per day, Nomi Zurita MD, 100 mg at 07/13/22 1102    gabapentin (NEURONTIN) capsule 200 mg, 200 mg, Oral, TID, Fanny Elizondo MD, 200 mg at 07/13/22 1102    ticagrelor (BRILINTA) tablet 90 mg, 90 mg, Oral, BID, Nomi Zurita MD, 90 mg at 07/13/22 1102    sodium chloride flush 0.9 % injection 5-40 mL, 5-40 mL, IntraVENous, 2 times per day, Mayte Schaefer MD, 10 mL at 07/12/22 2052    sodium chloride flush 0.9 % injection 5-40 mL, 5-40 mL, IntraVENous, PRN, Fanny Elizondo MD    0.9 % sodium chloride infusion, , IntraVENous, PRN, Mayte Schaefer MD    heparin (porcine) injection 5,000 Units, 5,000 Units, SubCUTAneous, 3 times per day, Mayte Schaefer MD, 5,000 Units at 07/11/22 2215    ondansetron (ZOFRAN-ODT) disintegrating tablet 4 mg, 4 mg, Oral, Q8H PRN **OR** ondansetron (ZOFRAN) injection 4 mg, 4 mg, IntraVENous, Q6H PRN, Fanny Elizondo MD, 4 mg at 07/11/22 1756    polyethylene glycol (GLYCOLAX) packet 17 g, 17 g, Oral, Daily PRN, Fanny Elizondo MD    acetaminophen (TYLENOL) tablet 650 mg, 650 mg, Oral, Q6H PRN **OR** acetaminophen (TYLENOL) suppository 650 mg, 650 mg, Rectal, Q6H PRN, Fanny Elizondo MD    aspirin EC tablet 81 mg, 81 mg, Oral, Daily, Fanny Elizondo MD, 81 mg at 07/13/22 1102    insulin lispro (HUMALOG) injection vial 0-6 Units, 0-6 Units, SubCUTAneous, TID Fanny HILLS MD    insulin lispro (HUMALOG) injection vial 0-3 Units, 0-3 Units, SubCUTAneous, Nightly, Fanny Elizondo MD    lanthanum (FOSRENOL) chewable tablet 1,000 mg, 1,000 mg, Oral, TID REINIER, Fanny Elizondo MD, 1,000 mg at 07/12/22 1716    insulin glargine (LANTUS) injection vial 5 Units, 5 Units, SubCUTAneous, Nightly, Georgie Parada MD    glucose chewable tablet 16 g, 4 tablet, Oral, PRN, Fanny Elizondo MD, 16 g at 07/11/22 1755    dextrose bolus 10% 125 mL, 125 mL, IntraVENous, PRN **OR** dextrose bolus 10% 250 mL, 250 mL, IntraVENous, PRN, Fanny Elizondo MD    glucagon (rDNA) injection 1 mg, 1 mg, IntraMUSCular, PRN, Fanny Elizondo MD    dextrose 5 % solution, 100 mL/hr, IntraVENous, PRN, Fanny Elizondo MD    0.9 % sodium chloride bolus, 500 mL, IntraVENous, Once, Georgie Parada MD    midodrine (PROAMATINE) tablet 10 mg, 10 mg, Oral, TID WC, Georgie Parada MD, 10 mg at 07/13/22 0800    DOPamine (INTROPIN) 800 mg in dextrose 5 % 250 mL infusion, 1-20 mcg/kg/min, IntraVENous, Continuous, Lori Bravo MD, Last Rate: 6.2 mL/hr at 07/13/22 1101, 5 mcg/kg/min at 07/13/22 1101    Physical Exam:  /65   Pulse 87   Temp 98.1 °F (36.7 °C) (Oral)   Resp 16   Ht 5' 10\" (1.778 m)   Wt 145 lb 1 oz (65.8 kg)   SpO2 92%   BMI 20.81 kg/m²   Wt Readings from Last 3 Encounters:   07/13/22 145 lb 1 oz (65.8 kg)   06/28/22 151 lb (68.5 kg)   05/11/22 175 lb 0.7 oz (79.4 kg)     Appearance: Awake, alert, no acute respiratory distress  Skin: Intact, no rash  Head: Normocephalic, atraumatic  Eyes: EOMI, no conjunctival erythema  ENMT: No pharyngeal erythema, MMM, no rhinorrhea  Neck: Supple, no elevated JVP, no carotid bruits  Lungs: Clear to auscultation bilaterally. No wheezes, rales, or rhonchi. Cardiac: PMI nondisplaced, Regular rhythm with a normal rate, S1 & S2 normal, no murmurs  Abdomen: Soft, nontender, +bowel sounds  Extremities: Moves all extremities x 4, no lower extremity edema  Neurologic: No focal motor deficits apparent, normal mood and affect  Peripheral Pulses: Intact posterior tibial pulses bilaterally    Intake/Output:  No intake or output data in the 24 hours ending 07/13/22 1108  No intake/output data recorded.     Laboratory Tests:  Recent Labs     07/11/22  1855 07/12/22  0611 07/13/22  0601    137 137   K 5.0 5.6* 6.2*    101 103   CO2 22 21* 19*   BUN 38* 41* 47*   CREATININE 4.2* 4.2* 4.7*   GLUCOSE 100* 143* 101*   CALCIUM 9.4 9.3 9.4     Lab Results   Component Value Date/Time    MG 2.1 07/13/2022 06:01 AM     Recent Labs     07/11/22  0046 07/12/22  0611 07/13/22  0601   ALKPHOS 216* 202* 186*   ALT 76* 97* 89*   AST 75* 103* 82*   PROT 6.2* 6.0* 5.7*   BILITOT 0.3 0.3 0.3   LABALBU 3.2* 3.2* 3.1*     Recent Labs     07/11/22  0046 07/11/22  0046 07/11/22  1855 07/12/22  0611 07/13/22  0601   WBC 5.0  --   --  5.5 6.6   RBC 4.26  --   --  4.62 4.12 HGB 12.1   < > 13.2 12.9 11.7   HCT 38.8   < > 42.2 41.5 37.1   MCV 91.1  --   --  89.8 90.0   MCH 28.4  --   --  27.9 28.4   MCHC 31.2*  --   --  31.1* 31.5*   RDW 17.3*  --   --  17.2* 17.3*     --   --  127* 122*   MPV 12.7*  --   --  NOT CALC 12.8*    < > = values in this interval not displayed. Lab Results   Component Value Date    CKTOTAL 25 07/11/2022    CKMB 2.7 05/20/2021    TROPONINI 0.83 (H) 05/20/2021    TROPONINI 0.81 (H) 05/20/2021    TROPONINI 0.82 (H) 05/19/2021     Lab Results   Component Value Date    INR 1.1 06/15/2022    INR 1.0 01/20/2022    INR 1.0 12/02/2021    PROTIME 12.3 06/15/2022    PROTIME 11.1 01/20/2022    PROTIME 10.9 12/02/2021     Lab Results   Component Value Date    TSH 4.170 07/11/2022     Lab Results   Component Value Date    LABA1C 5.1 05/04/2022     No results found for: EAG  Lab Results   Component Value Date    CHOL 103 05/04/2022    CHOL 100 04/15/2022    CHOL 101 05/19/2021     Lab Results   Component Value Date    TRIG 66 05/04/2022    TRIG 107 04/15/2022    TRIG 106 05/19/2021     Lab Results   Component Value Date    HDL 42 05/04/2022    HDL 27 04/15/2022    HDL 38 05/19/2021     Lab Results   Component Value Date    LDLCALC 48 05/04/2022    LDLCALC 52 04/15/2022    LDLCALC 42 05/19/2021     Lab Results   Component Value Date    LABVLDL 13 05/04/2022    LABVLDL 21 04/15/2022    LABVLDL 21 05/19/2021     No results found for: CHOLHDLRATIO  No results for input(s): PROBNP in the last 72 hours. Cardiac Tests:    1. Prior cardiac work-up and history:  Lexiscan stress test May 19, 2021, abnormal with a large fixed defect in the apical and septal wall small fixed defect in the inferior apical wall partially reversible defect in the small area of the anterior lateral wall and EF 25% with apical septal akinesis and moderate global hypokinesis and dilated left ventricle during stress and rest and was a high risk study  2.  Left heart cath May 2021 she underwent stenting to the LAD and RCA. CONCLUSION: Coronary artery disease: Left main:  20% eccentric mid vessel narrowing. LAD:  50% proximal vessel narrowing and 95% mid vessel stenosis. Trivial diagonal branch with 90% stenosis. LCX:  Occluded after a small caliber first marginal branch which is a chronic total occlusion.  The second larger marginal branch filled late. RCA:  Large, dominant vessel with 90% heavily calcified mid vessel stenosis.  50% disease at the level of the crux and 70% ostial stenosis of the posterior descending artery branch. Markedly elevated left ventricular end-diastolic pressure. Successful balloon angioplasty with deployment of drug-eluting coronary stent to the mid LAD with very good results. Successful balloon angioplasty with deployment of drug-eluting coronary stent to the mid RCA with poststent deployment dilatation with a larger high-pressure noncompliant balloon with good results. 3. 2D echocardiogram on May 21, 2021 left ventricle is dilated There is apical, septal and basal inferior wall severe hypokinesis to akinesis Ejection fraction is visually estimated at 30+/-5%. There is doppler evidence of stage III diastolic dysfunction. Normal right ventricular size. Right ventricle global systolic function is mildly reduced . The left atrium is moderately dilated. Severe posterior mitral annular calcification. Focal calcification mitral valve leaflets. Chordal calcification is present. A mobile well defined 1.0 cm by 0.5 cm echo density is noted on the ventricular aspect of the mitral valve suggestive of a fibroelastoma: clinical correlation is needed. No mitral stenosis. Mild mitral regurgitation. Mild to moderate tricuspid regurgitation. Moderate pulmonary hypertension. Aortic root is sclerotic and calcified.   4. 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 RECOMMENDATIONS:  Continue current treatment as per CT Surgery.  Per CTS, she was scheduled for a CABG x2 With a saphenous vein graft to the RCA and a saphenous vein graft to the circumflex and excision of mitral valve mass  7. January 3, 2022 osteomyelitis left middle finger left hand, January 20, 2022 amputation left third digit distal phalanx for osteomyelitis (open heart postponed) patient stopped taking aspirin at that time but was only of Brilinta a day or 2  8. Chronic hemodialysis on Monday Wednesday and Friday/renal osteodystrophy  9. Anemia  10. Hospitalization April 15 through April 22 for osteomyelitis of the lumbar spine no abdominal  11. KIARA 4/19/2022 ([place]):  LV systolic function mildly reduced.  Ejection fraction is visually estimated at 40-45%.  Moderate mitral annular calcification.   Valvular and subvalvular calcification.  Mild mitral regurgitation.  No definitive evidence of papillary fibroelastoma.   12. Non-ST elevation MI cath / PCI 5/5/22 ( Dr. Cheryl Parham ): .  In-stent stenosis of mid RCA with successful balloon  angioplasty (pre-PCI LATOSHA-3 flow, post-PCI LATOSHA-3 flow, pre-PCI stenosis 90%, post-PCI stenosis less than 10%). 2.  Patent stent in the LAD. 3.  Totally occluded left circumflex artery.    Discharged to SNF for 6 weeks of antibiotics             Impression  1. Bradycardia and hypotension (with a history of hypertension) after taking 2 Percocet.  Hypotension resolved after fluid boluses, increasing midodrine  2. Elevated troponin with no chest pain, no acute coronary syndrome and a history of elevated troponins,, nonspecific T wave changes laterally which were present prior EKGs  3.  Ischemic cardiomyopathy coronary artery disease with PCI/drug-eluting stents to the LAD and RCA in May 2021 following a non-ST elevation MI, chronic circumflex occlusion known by left heart cath in May 2021 with a trivial diagonal branch 90%.  And a non-ST elevation MI May 5, 2022 with a evidence of high-grade AV block. 8. Can consider switching or weaning off dopamine and addition of Levophed. So far, her bradycardia does not appear to be hemodynamically significant. Infectious work-up negative so far. Etiology of  shock unclear at this point       Ashley Marin MD, [address] [place] [address] Cardiology    NOTE: This report was transcribed using voice recognition software. Every effort was made to ensure accuracy; however, inadvertent computerized transcription errors may be present.

## 2022-07-13 NOTE — PROGRESS NOTES
[facility]    Chief complaint :  Chief Complaint   Patient presents with    Hypotension     given 2 percocet and 3 doses of midodrine       Subjective:  Patient describes feeling better this morning with improvement since admission. She is having difficulty tolerating diet with nausea. Patient denies fever, chills, CP, vomiting, abdominal pain. Per nurse, no acute overnight events    Past medical, surgical, family and social history were reviewed, non-contributory, and unchanged unless otherwise stated. ROS negative except as stated above. Objective:  BP (!) 94/42   Pulse 77   Temp 98 °F (36.7 °C)   Resp 16   Ht 5' 10\" (1.778 m)   Wt 145 lb 1 oz (65.8 kg)   SpO2 92%   BMI 20.81 kg/m²     Physical Exam  Constitutional:       Appearance: Normal appearance. HENT:      Head: Normocephalic and atraumatic. Right Ear: External ear normal.      Left Ear: External ear normal.      Nose: Nose normal.      Mouth/Throat:      Pharynx: No oropharyngeal exudate or posterior oropharyngeal erythema. Eyes:      General: No scleral icterus. Right eye: No discharge. Left eye: No discharge. Extraocular Movements: Extraocular movements intact. Conjunctiva/sclera: Conjunctivae normal.      Pupils: Pupils are equal, round, and reactive to light. Cardiovascular:      Rate and Rhythm: Regular rhythm. Bradycardia present. Heart sounds: Normal heart sounds. No murmur heard. No friction rub. No gallop. Pulmonary:      Effort: Pulmonary effort is normal.      Breath sounds: Normal breath sounds. No wheezing, rhonchi or rales. Chest:      Chest wall: No tenderness. Abdominal:      General: Abdomen is flat. There is no distension. Palpations: Abdomen is soft. There is no mass. Tenderness: There is abdominal tenderness (minimal, diffuse). There is no guarding or rebound.    Musculoskeletal:      Cervical back: Normal range of motion. Skin:     General: Skin is warm and dry. Neurological:      General: No focal deficit present. Mental Status: She is alert and oriented to person, place, and time. Psychiatric:         Mood and Affect: Mood normal.         Behavior: Behavior normal.         Thought Content:  Thought content normal.      R femoral CVC in place (day 1)    Labs:  Recent Results (from the past 24 hour(s))   Respiratory Panel, Molecular, with COVID-19 (Restricted: peds pts or suitable admitted adults)    Collection Time: 07/12/22  9:07 AM    Specimen: Nasopharyngeal   Result Value Ref Range    Adenovirus by PCR Not Detected Not Detected    Bordetella parapertussis by PCR Not Detected Not Detected    Bordetella pertussis by PCR Not Detected Not Detected    Chlamydophilia pneumoniae by PCR Not Detected Not Detected    Coronavirus 229E by PCR Not Detected Not Detected    Coronavirus HKU1 by PCR Not Detected Not Detected    Coronavirus NL63 by PCR Not Detected Not Detected    Coronavirus OC43 by PCR Not Detected Not Detected    SARS-CoV-2, PCR Not Detected Not Detected    Human Metapneumovirus by PCR Not Detected Not Detected    Human Rhinovirus/Enterovirus by PCR Not Detected Not Detected    Influenza A by PCR Not Detected Not Detected    Influenza B by PCR Not Detected Not Detected    Mycoplasma pneumoniae by PCR Not Detected Not Detected    Parainfluenza Virus 1 by PCR Not Detected Not Detected    Parainfluenza Virus 2 by PCR Not Detected Not Detected    Parainfluenza Virus 3 by PCR Not Detected Not Detected    Parainfluenza Virus 4 by PCR Not Detected Not Detected    Respiratory Syncytial Virus by PCR Not Detected Not Detected   POCT Glucose    Collection Time: 07/12/22 11:51 AM   Result Value Ref Range    Meter Glucose 90 74 - 99 mg/dL   POCT Glucose    Collection Time: 07/12/22  5:15 PM   Result Value Ref Range    Meter Glucose 117 (H) 74 - 99 mg/dL   POCT Glucose    Collection Time: 07/12/22  8:59 PM Result Value Ref Range    Meter Glucose 92 74 - 99 mg/dL   CBC with Auto Differential    Collection Time: 07/13/22  6:01 AM   Result Value Ref Range    WBC 6.6 4.5 - 11.5 E9/L    RBC 4.12 3.50 - 5.50 E12/L    Hemoglobin 11.7 11.5 - 15.5 g/dL    Hematocrit 37.1 34.0 - 48.0 %    MCV 90.0 80.0 - 99.9 fL    MCH 28.4 26.0 - 35.0 pg    MCHC 31.5 (L) 32.0 - 34.5 %    RDW 17.3 (H) 11.5 - 15.0 fL    Platelets 294 (L) 265 - 450 E9/L    MPV 12.8 (H) 7.0 - 12.0 fL    Neutrophils % 76.9 43.0 - 80.0 %    Immature Granulocytes % 0.5 0.0 - 5.0 %    Lymphocytes % 15.6 (L) 20.0 - 42.0 %    Monocytes % 5.9 2.0 - 12.0 %    Eosinophils % 0.9 0.0 - 6.0 %    Basophils % 0.2 0.0 - 2.0 %    Neutrophils Absolute 5.08 1.80 - 7.30 E9/L    Immature Granulocytes # 0.03 E9/L    Lymphocytes Absolute 1.03 (L) 1.50 - 4.00 E9/L    Monocytes Absolute 0.39 0.10 - 0.95 E9/L    Eosinophils Absolute 0.06 0.05 - 0.50 E9/L    Basophils Absolute 0.01 0.00 - 0.20 E9/L   Phosphorus    Collection Time: 07/13/22  6:01 AM   Result Value Ref Range    Phosphorus 5.6 (H) 2.5 - 4.5 mg/dL   Magnesium    Collection Time: 07/13/22  6:01 AM   Result Value Ref Range    Magnesium 2.1 1.6 - 2.6 mg/dL   Comprehensive Metabolic Panel    Collection Time: 07/13/22  6:01 AM   Result Value Ref Range    Sodium 137 132 - 146 mmol/L    Potassium 6.2 (H) 3.5 - 5.0 mmol/L    Chloride 103 98 - 107 mmol/L    CO2 19 (L) 22 - 29 mmol/L    Anion Gap 15 7 - 16 mmol/L    Glucose 101 (H) 74 - 99 mg/dL    BUN 47 (H) 6 - 23 mg/dL    CREATININE 4.7 (H) 0.5 - 1.0 mg/dL    GFR Non-African American 11 >=60 mL/min/1.73    GFR African American 11     Calcium 9.4 8.6 - 10.2 mg/dL    Total Protein 5.7 (L) 6.4 - 8.3 g/dL    Albumin 3.1 (L) 3.5 - 5.2 g/dL    Total Bilirubin 0.3 0.0 - 1.2 mg/dL    Alkaline Phosphatase 186 (H) 35 - 104 U/L    ALT 89 (H) 0 - 32 U/L    AST 82 (H) 0 - 31 U/L       Radiology and other tests reviewed:  XR CHEST PORTABLE   Final Result   No acute process. 100 mg BID     Hyperlipidemia  - Continue home Lipitor 80 mg nightly    Type II Diabetes Mellitus  - Continue home Lantus 5U nightly  - Hypoglycemia protocol      DVT ppx: Heparin  GI ppx: Protonix  Code Status: Full      Radha Don MD   Family Medicine Resident PGY-2  07/13/22   8:49 AM

## 2022-07-14 LAB
ALBUMIN SERPL-MCNC: 2.9 G/DL (ref 3.5–5.2)
ALP BLD-CCNC: 153 U/L (ref 35–104)
ALT SERPL-CCNC: 59 U/L (ref 0–32)
ANION GAP SERPL CALCULATED.3IONS-SCNC: 9 MMOL/L (ref 7–16)
AST SERPL-CCNC: 51 U/L (ref 0–31)
BASOPHILS ABSOLUTE: 0.02 E9/L (ref 0–0.2)
BASOPHILS RELATIVE PERCENT: 0.5 % (ref 0–2)
BILIRUB SERPL-MCNC: 0.4 MG/DL (ref 0–1.2)
BUN BLDV-MCNC: 24 MG/DL (ref 6–23)
CALCIUM SERPL-MCNC: 8.9 MG/DL (ref 8.6–10.2)
CHLORIDE BLD-SCNC: 104 MMOL/L (ref 98–107)
CO2: 24 MMOL/L (ref 22–29)
CREAT SERPL-MCNC: 3.2 MG/DL (ref 0.5–1)
EOSINOPHILS ABSOLUTE: 0.09 E9/L (ref 0.05–0.5)
EOSINOPHILS RELATIVE PERCENT: 2.1 % (ref 0–6)
GFR AFRICAN AMERICAN: 18
GFR NON-AFRICAN AMERICAN: 18 ML/MIN/1.73
GLUCOSE BLD-MCNC: 102 MG/DL (ref 74–99)
HCT VFR BLD CALC: 28.5 % (ref 34–48)
HEMOGLOBIN: 8.9 G/DL (ref 11.5–15.5)
IMMATURE GRANULOCYTES #: 0.02 E9/L
IMMATURE GRANULOCYTES %: 0.5 % (ref 0–5)
LACTIC ACID: 0.8 MMOL/L (ref 0.5–2.2)
LYMPHOCYTES ABSOLUTE: 0.93 E9/L (ref 1.5–4)
LYMPHOCYTES RELATIVE PERCENT: 21.9 % (ref 20–42)
MAGNESIUM: 1.9 MG/DL (ref 1.6–2.6)
MCH RBC QN AUTO: 27.9 PG (ref 26–35)
MCHC RBC AUTO-ENTMCNC: 31.2 % (ref 32–34.5)
MCV RBC AUTO: 89.3 FL (ref 80–99.9)
METER GLUCOSE: 113 MG/DL (ref 74–99)
METER GLUCOSE: 124 MG/DL (ref 74–99)
METER GLUCOSE: 145 MG/DL (ref 74–99)
MONOCYTES ABSOLUTE: 0.46 E9/L (ref 0.1–0.95)
MONOCYTES RELATIVE PERCENT: 10.8 % (ref 2–12)
NEUTROPHILS ABSOLUTE: 2.72 E9/L (ref 1.8–7.3)
NEUTROPHILS RELATIVE PERCENT: 64.2 % (ref 43–80)
PDW BLD-RTO: 17.2 FL (ref 11.5–15)
PHOSPHORUS: 4.1 MG/DL (ref 2.5–4.5)
PLATELET # BLD: 79 E9/L (ref 130–450)
PLATELET CONFIRMATION: NORMAL
PMV BLD AUTO: ABNORMAL FL (ref 7–12)
POTASSIUM SERPL-SCNC: 4.4 MMOL/L (ref 3.5–5)
RBC # BLD: 3.19 E12/L (ref 3.5–5.5)
SODIUM BLD-SCNC: 137 MMOL/L (ref 132–146)
TOTAL PROTEIN: 5.2 G/DL (ref 6.4–8.3)
WBC # BLD: 4.2 E9/L (ref 4.5–11.5)

## 2022-07-14 PROCEDURE — 99231 SBSQ HOSP IP/OBS SF/LOW 25: CPT | Performed by: FAMILY MEDICINE

## 2022-07-14 PROCEDURE — 99233 SBSQ HOSP IP/OBS HIGH 50: CPT | Performed by: INTERNAL MEDICINE

## 2022-07-14 PROCEDURE — 2580000003 HC RX 258

## 2022-07-14 PROCEDURE — 82962 GLUCOSE BLOOD TEST: CPT

## 2022-07-14 PROCEDURE — 6370000000 HC RX 637 (ALT 250 FOR IP): Performed by: INTERNAL MEDICINE

## 2022-07-14 PROCEDURE — 85025 COMPLETE CBC W/AUTO DIFF WBC: CPT

## 2022-07-14 PROCEDURE — 2000000000 HC ICU R&B

## 2022-07-14 PROCEDURE — 6370000000 HC RX 637 (ALT 250 FOR IP)

## 2022-07-14 PROCEDURE — 83735 ASSAY OF MAGNESIUM: CPT

## 2022-07-14 PROCEDURE — 84100 ASSAY OF PHOSPHORUS: CPT

## 2022-07-14 PROCEDURE — 83605 ASSAY OF LACTIC ACID: CPT

## 2022-07-14 PROCEDURE — 36415 COLL VENOUS BLD VENIPUNCTURE: CPT

## 2022-07-14 PROCEDURE — 6360000002 HC RX W HCPCS

## 2022-07-14 PROCEDURE — 80053 COMPREHEN METABOLIC PANEL: CPT

## 2022-07-14 PROCEDURE — 86022 PLATELET ANTIBODIES: CPT

## 2022-07-14 PROCEDURE — 36592 COLLECT BLOOD FROM PICC: CPT

## 2022-07-14 RX ADMIN — MIDODRINE HYDROCHLORIDE 15 MG: 5 TABLET ORAL at 11:25

## 2022-07-14 RX ADMIN — DOXYCYCLINE HYCLATE 100 MG: 100 CAPSULE ORAL at 20:05

## 2022-07-14 RX ADMIN — LANTHANUM CARBONATE 1000 MG: 500 TABLET, CHEWABLE ORAL at 11:25

## 2022-07-14 RX ADMIN — SODIUM CHLORIDE: 9 INJECTION, SOLUTION INTRAVENOUS at 01:35

## 2022-07-14 RX ADMIN — INSULIN LISPRO 1 UNITS: 100 INJECTION, SOLUTION INTRAVENOUS; SUBCUTANEOUS at 16:58

## 2022-07-14 RX ADMIN — ASPIRIN 81 MG: 81 TABLET, COATED ORAL at 09:03

## 2022-07-14 RX ADMIN — GABAPENTIN 200 MG: 100 CAPSULE ORAL at 09:03

## 2022-07-14 RX ADMIN — MIDODRINE HYDROCHLORIDE 15 MG: 5 TABLET ORAL at 16:57

## 2022-07-14 RX ADMIN — TICAGRELOR 90 MG: 90 TABLET ORAL at 09:03

## 2022-07-14 RX ADMIN — GABAPENTIN 200 MG: 100 CAPSULE ORAL at 14:24

## 2022-07-14 RX ADMIN — PANTOPRAZOLE SODIUM 40 MG: 40 TABLET, DELAYED RELEASE ORAL at 05:29

## 2022-07-14 RX ADMIN — BRIMONIDINE TARTRATE 1 DROP: 2 SOLUTION OPHTHALMIC at 09:09

## 2022-07-14 RX ADMIN — ATORVASTATIN CALCIUM 80 MG: 40 TABLET, FILM COATED ORAL at 20:05

## 2022-07-14 RX ADMIN — LANTHANUM CARBONATE 1000 MG: 500 TABLET, CHEWABLE ORAL at 16:59

## 2022-07-14 RX ADMIN — GABAPENTIN 200 MG: 100 CAPSULE ORAL at 20:05

## 2022-07-14 RX ADMIN — MIDODRINE HYDROCHLORIDE 15 MG: 5 TABLET ORAL at 09:03

## 2022-07-14 RX ADMIN — LANTHANUM CARBONATE 1000 MG: 500 TABLET, CHEWABLE ORAL at 09:03

## 2022-07-14 RX ADMIN — HEPARIN SODIUM 5000 UNITS: 10000 INJECTION, SOLUTION INTRAVENOUS; SUBCUTANEOUS at 05:21

## 2022-07-14 RX ADMIN — SODIUM CHLORIDE: 9 INJECTION, SOLUTION INTRAVENOUS at 14:29

## 2022-07-14 RX ADMIN — TICAGRELOR 90 MG: 90 TABLET ORAL at 20:06

## 2022-07-14 RX ADMIN — ALLOPURINOL 100 MG: 100 TABLET ORAL at 09:03

## 2022-07-14 RX ADMIN — SODIUM CHLORIDE, PRESERVATIVE FREE 10 ML: 5 INJECTION INTRAVENOUS at 09:09

## 2022-07-14 RX ADMIN — DOXYCYCLINE HYCLATE 100 MG: 100 CAPSULE ORAL at 09:03

## 2022-07-14 RX ADMIN — SODIUM CHLORIDE, PRESERVATIVE FREE 10 ML: 5 INJECTION INTRAVENOUS at 20:06

## 2022-07-14 ASSESSMENT — PAIN SCALES - GENERAL
PAINLEVEL_OUTOF10: 0

## 2022-07-14 NOTE — PLAN OF CARE
Problem: ABCDS Injury Assessment  Goal: Absence of physical injury  7/14/2022 1859 by Misael Pina RN  Outcome: Progressing     Problem: Skin/Tissue Integrity  Goal: Absence of new skin breakdown  Description: 1. Monitor for areas of redness and/or skin breakdown  2. Assess vascular access sites hourly  3. Every 4-6 hours minimum:  Change oxygen saturation probe site  4. Every 4-6 hours:  If on nasal continuous positive airway pressure, respiratory therapy assess nares and determine need for appliance change or resting period.   7/14/2022 1859 by Misael Pina RN  Outcome: Progressing     Problem: Safety - Adult  Goal: Free from fall injury  7/14/2022 1859 by Misael Pina RN  Outcome: Progressing     Problem: Chronic Conditions and Co-morbidities  Goal: Patient's chronic conditions and co-morbidity symptoms are monitored and maintained or improved  7/14/2022 1859 by Misael Pina RN  Outcome: Progressing

## 2022-07-14 NOTE — PLAN OF CARE
Problem: ABCDS Injury Assessment  Goal: Absence of physical injury  Outcome: Progressing     Problem: Skin/Tissue Integrity  Goal: Absence of new skin breakdown  Description: 1. Monitor for areas of redness and/or skin breakdown  2. Assess vascular access sites hourly  3. Every 4-6 hours minimum:  Change oxygen saturation probe site  4. Every 4-6 hours:  If on nasal continuous positive airway pressure, respiratory therapy assess nares and determine need for appliance change or resting period.   Outcome: Progressing     Problem: Discharge Planning  Goal: Discharge to home or other facility with appropriate resources  Outcome: Progressing     Problem: Safety - Adult  Goal: Free from fall injury  Outcome: Progressing  Flowsheets (Taken 7/14/2022 0000)  Free From Fall Injury: Instruct family/caregiver on patient safety     Problem: Chronic Conditions and Co-morbidities  Goal: Patient's chronic conditions and co-morbidity symptoms are monitored and maintained or improved  Outcome: Progressing     Problem: Pain  Goal: Verbalizes/displays adequate comfort level or baseline comfort level  Outcome: Progressing

## 2022-07-14 NOTE — PROGRESS NOTES
Department of Internal Medicine  Nephrology Attending Progress Note        SUBJECTIVE:  Mrs Nitin Obregon being followed for ESKD    22: Pt awake alert appropriate. She denies CP or SOB      PMH   End-stage renal disease on dialysis   Hypertension  Hyperlipidemia  Type 2 diabetes with neuropathy, retinopathy  Anemia of chronic kidney disease (patient Jehovah witness)  Secondary hyperparathyroid disease of renal origin  History of breast cancer rt side  History of coronary disease status post heart cath with balloon angioplasty 22  Carpal tunnel syndrome with bilateral release  History of osteomyelitis/discitis  L1-L2 on IV Vanco for 6 weeks now on suppressive doxycycline  History of spinal fusion of L3/L4/L5  History of glaucoma    Physical Exam:    Vitals:    22 1100   BP: (!) 99/46   Pulse: 52   Resp: 17   Temp:    SpO2: 100%   MAXIMUM TEMPERATURE OVER 24HRS:  Temp (24hrs), Av.1 °F (36.7 °C), Min:96.8 °F (36 °C), Max:98.6 °F (37 °C)    TEMPERATURE RANGE OVER 24HRS:   Temp  Av.1 °F (36.7 °C)  Min: 96.8 °F (36 °C)  Max: 98.6 °F (37 °C)  24HR RESPIRATORY RATE RANGE:  Resp  Av.1  Min: 13  Max: 44  24HR PULSE RANGE: Pulse  Av.5  Min: 50  Max: 90  CURRENT BLOOD PRESSURE:  BP: (!) 99/46    24HR BLOOD PRESSURE RANGE:  Systolic (14GPE), NFQ:823 , Min:69 , XXI:143   ; Diastolic (43BRA), NDI:16, Min:30, Max:58    8HR BLOOD PRESSURE RANGE:  BP: ()/(32-57)   24HR PULSE OXIMETRY RANGE:  SpO2  Av.8 %  Min: 96 %  Max: 100 %  24HR INTAKE/OUTPUT:      Intake/Output Summary (Last 24 hours) at 2022 1102  Last data filed at 2022 1030  Gross per 24 hour   Intake 4564.96 ml   Output 400 ml   Net 4164.96 ml     8HR INTAKE OUTPUT:  In: 178.2 [P.O.:120;  I.V.:58.2]  Out: -   VENT SETTINGS:       Additional Respiratory Assessments  Heart Rate: 52  Resp: 17  SpO2: 100 %      General: Awake, alert, no acute distress  Neck: No JVD noted  Lungs: Diminished at the bases bilaterally. CV: Regular rate and rhythm but bradycardic.  No rub  Abd: Soft, nontender, nondistended.  Active bowel sounds  Skin: Warm and dry.  No rash on exposed extremities  Ext: 1+ RUE edema (h/o breast CA);  No BLE edema ; LUE AV fistula   Neuro: Awake, answers questions appropriately    DATA:    CBC with Differential:    Lab Results   Component Value Date/Time    WBC 4.2 07/14/2022 05:07 AM    RBC 3.19 07/14/2022 05:07 AM    HGB 8.9 07/14/2022 05:07 AM    HCT 28.5 07/14/2022 05:07 AM    PLT 79 07/14/2022 05:07 AM    MCV 89.3 07/14/2022 05:07 AM    MCH 27.9 07/14/2022 05:07 AM    MCHC 31.2 07/14/2022 05:07 AM    RDW 17.2 07/14/2022 05:07 AM    NRBC 0.0 02/18/2017 04:00 AM    SEGSPCT 70 03/12/2014 10:53 AM    BANDSPCT 1 02/18/2015 10:35 AM    LYMPHOPCT 21.9 07/14/2022 05:07 AM    PROMYELOPCT 1.8 02/18/2017 04:00 AM    MONOPCT 10.8 07/14/2022 05:07 AM    MYELOPCT 0.9 10/24/2017 10:45 AM    BASOPCT 0.5 07/14/2022 05:07 AM    MONOSABS 0.46 07/14/2022 05:07 AM    LYMPHSABS 0.93 07/14/2022 05:07 AM    EOSABS 0.09 07/14/2022 05:07 AM    BASOSABS 0.02 07/14/2022 05:07 AM     CMP:    Lab Results   Component Value Date/Time     07/14/2022 05:07 AM    K 4.4 07/14/2022 05:07 AM    K 4.7 07/11/2022 12:46 AM     07/14/2022 05:07 AM    CO2 24 07/14/2022 05:07 AM    BUN 24 07/14/2022 05:07 AM    CREATININE 3.2 07/14/2022 05:07 AM    GFRAA 18 07/14/2022 05:07 AM    LABGLOM 18 07/14/2022 05:07 AM    GLUCOSE 102 07/14/2022 05:07 AM    GLUCOSE 46 04/02/2012 11:00 AM    PROT 5.2 07/14/2022 05:07 AM    LABALBU 2.9 07/14/2022 05:07 AM    LABALBU 3.8 04/02/2012 11:00 AM    CALCIUM 8.9 07/14/2022 05:07 AM    BILITOT 0.4 07/14/2022 05:07 AM    ALKPHOS 153 07/14/2022 05:07 AM    AST 51 07/14/2022 05:07 AM    ALT 59 07/14/2022 05:07 AM            pantoprazole  40 mg Oral QAM AC    midodrine  15 mg Oral TID WC    latanoprost  1 drop Both Eyes Daily    brimonidine  1 drop Right Eye BID    vancomycin  1,000 mg IntraVENous Q MWF    allopurinol  100 mg Oral Daily    atorvastatin  80 mg Oral Nightly    doxycycline hyclate  100 mg Oral 2 times per day    gabapentin  200 mg Oral TID    ticagrelor  90 mg Oral BID    sodium chloride flush  5-40 mL IntraVENous 2 times per day    aspirin  81 mg Oral Daily    insulin lispro  0-6 Units SubCUTAneous TID WC    insulin lispro  0-3 Units SubCUTAneous Nightly    lanthanum  1,000 mg Oral TID WC    insulin glargine  5 Units SubCUTAneous Nightly    sodium chloride  500 mL IntraVENous Once      norepinephrine 1 mcg/min (07/14/22 1048)    sodium chloride 75 mL/hr at 07/14/22 1030    sodium chloride      dextrose       sodium chloride flush, sodium chloride, ondansetron **OR** ondansetron, polyethylene glycol, acetaminophen **OR** acetaminophen, glucose, dextrose bolus **OR** dextrose bolus, glucagon (rDNA), dextrose    IMPRESSION/RECOMMENDATIONS:      1. End-stage renal disease with Intradialytic Hypotension  PLAN:  1. Continue IHD on  Monday Wednesday Friday   2. Follow labs  3. Will use cooled dialysate and titrate the midodrine to 15mg tid    2. Hyperkalemia  K+ WNL post IHD 7/13/22  PLAN:  1. Follow K+    3. Hypotension with   Sinus bradycardia   metoprolol and timolol eye drops discontinued-remains bradycardic-concern for autonomic dysfunction-continues on norepi 1mcg  PLAN:  1. Continue to monitor-cardiology 7/13/22 note reviewed-plan is eval for chronotropic incompetence-no temp pacer planned    4. Anemia of chronic kidney disease   HgB 11.7-->8.9 over 24hrs with decrease in -->79 in the setting of the prior bleeding from the TLC site  PLAN:  1. Resume JAYSHREE   2. Follow H/H    5. Secondary hyperparathyroidism of renal origin with Hyperphosphatemia  PO4 now WNL  PLAN:  1. Follow Ca++ and PO4  2.  Hold PO4 binder      Kaleigh Sparks MD  7/14/2022 11:02 AM

## 2022-07-14 NOTE — PROGRESS NOTES
INPATIENT CARDIOLOGY FOLLOW-UP    Name: Marcus Beltran    Age: 78 y.o. Date of Admission: 7/11/2022 12:11 AM    Date of Service: 7/14/2022    Primary Cardiologist: Dr. Jane Millan    Chief Complaint: Follow-up for symptomatic bradycardia    Interim History:  No new overnight cardiac complaints. Currently with no complaints of CP, SOB, palpitations, dizziness, or lightheadedness. Sinus bradycardia on telemetry. Rates have been in the 45s. No evidence of high-grade AV block. Switched to Frederick-Synephrine from dopamine. Nausea is improved. Pressures are steady.     Review of Systems:   Negative except as described above    Problem List:  Patient Active Problem List   Diagnosis    Diabetic retinopathy ([address]    Malignant neoplasm of right female breast ([address]    Atherosclerosis of native coronary artery of native heart without angina pectoris    Moderate obesity    Left ventricular hypertrophy    Herniated lumbar intervertebral disc    Lumbar degenerative disc disease    Pseudomeningocele    Lumbar radiculopathy    Lymphedema of arm    Anemia of chronic disease    Chronic diastolic CHF (congestive heart failure) ([address]    Hypertension    Glaucoma    Refusal of blood product    Elevated troponin    Controlled type 2 diabetes mellitus with chronic kidney disease on chronic dialysis, with long-term current use of insulin (HCC)    Vitreous hemorrhage ([address]    Patient is Latter-day    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 ([address]    Chronic pain syndrome    Lumbar post-laminectomy syndrome    Cervicalgia    Diabetic peripheral neuropathy ([address]    ESRD (end stage renal disease) ([address]    Bilateral carpal tunnel syndrome    Cardiac arrest ([address]    ESRD (end stage renal disease) on dialysis ([address]    Mixed hyperlipidemia    Lymphedema of right upper extremity    Coronary artery disease involving native coronary artery of native heart with angina pectoris ([address]    Mitral valve disease    CAD in native artery    Lumbar stenosis without neurogenic claudication    Intractable low back pain    Unable to ambulate    NSTEMI (non-ST elevated myocardial infarction) (HCC)    Moderate protein-calorie malnutrition (HCC)    Ischemic cardiomyopathy    Lower abdominal pain    Pain    Hypotension    Vertebral osteomyelitis, chronic (HCC)    Symptomatic sinus bradycardia       Current Medications:    Current Facility-Administered Medications:     [START ON 7/15/2022] epoetin fani-epbx (RETACRIT) injection 3,000 Units, 3,000 Units, SubCUTAneous, Once per day on Mon Wed Fri, Sue Flores MD    pantoprazole (PROTONIX) tablet 40 mg, 40 mg, Oral, QAM AC, Darcy Cline MD, 40 mg at 07/14/22 0529    midodrine (PROAMATINE) tablet 15 mg, 15 mg, Oral, TID WC, Marislo Madsen MD, 15 mg at 07/14/22 1125    norepinephrine (LEVOPHED) 16 mg in dextrose 5 % 250 mL infusion, 1-100 mcg/min, IntraVENous, Continuous, Darcy Cline MD, Last Rate: 0.9 mL/hr at 07/14/22 1048, 1 mcg/min at 07/14/22 1048    latanoprost (XALATAN) 0.005 % ophthalmic solution 1 drop, 1 drop, Both Eyes, Daily, Tram Ahmadi MD, 1 drop at 07/12/22 2051    brimonidine (ALPHAGAN) 0.2 % ophthalmic solution 1 drop, 1 drop, Right Eye, BID, 1 drop at 07/14/22 0909 **AND** [DISCONTINUED] timolol (TIMOPTIC) 0.5 % ophthalmic solution 1 drop, 1 drop, Right Eye, BID, Marcio Newell MD, 1 drop at 07/12/22 1252    vancomycin 1000 mg IVPB in 250 mL D5W addavial, 1,000 mg, IntraVENous, Q MWF, Marce Bay MD, Stopped at 07/13/22 1308    0.9 % sodium chloride infusion, , IntraVENous, Continuous, Fanny Elizondo MD, Last Rate: 75 mL/hr at 07/14/22 1030, Rate Verify at 07/14/22 1030    allopurinol (ZYLOPRIM) tablet 100 mg, 100 mg, Oral, Daily, Fanny Elizondo MD, 100 mg at 07/14/22 0903    atorvastatin (LIPITOR) tablet 80 mg, 80 mg, Oral, Nightly, Fanny Elizondo MD, 80 mg at 07/13/22 2122    doxycycline hyclate (VIBRAMYCIN) capsule 100 mg, 100 mg, Oral, 2 times per day, Mayte Schaefer MD, 100 mg at 07/14/22 0903    gabapentin (NEURONTIN) capsule 200 mg, 200 mg, Oral, TID, Mayte Schaefer MD, 200 mg at 07/14/22 0903    ticagrelor (BRILINTA) tablet 90 mg, 90 mg, Oral, BID, Fanny Elizondo MD, 90 mg at 07/14/22 0903    sodium chloride flush 0.9 % injection 5-40 mL, 5-40 mL, IntraVENous, 2 times per day, Mayte Schaefer MD, 10 mL at 07/14/22 0909    sodium chloride flush 0.9 % injection 5-40 mL, 5-40 mL, IntraVENous, PRN, Fanny Elizondo MD    0.9 % sodium chloride infusion, , IntraVENous, PRN, Mayte Schaefer MD    ondansetron (ZOFRAN-ODT) disintegrating tablet 4 mg, 4 mg, Oral, Q8H PRN **OR** ondansetron (ZOFRAN) injection 4 mg, 4 mg, IntraVENous, Q6H PRN, Mayte Schaefer MD, 4 mg at 07/11/22 1756    polyethylene glycol (GLYCOLAX) packet 17 g, 17 g, Oral, Daily PRN, Fanny Elizondo MD    acetaminophen (TYLENOL) tablet 650 mg, 650 mg, Oral, Q6H PRN **OR** acetaminophen (TYLENOL) suppository 650 mg, 650 mg, Rectal, Q6H PRN, Mayte Schaefer MD    aspirin EC tablet 81 mg, 81 mg, Oral, Daily, Fanny Elizondo MD, 81 mg at 07/14/22 0903    insulin lispro (HUMALOG) injection vial 0-6 Units, 0-6 Units, SubCUTAneous, TID REINIER, Fanny Elizondo MD    insulin lispro (HUMALOG) injection vial 0-3 Units, 0-3 Units, SubCUTAneous, Nightly, Fanny Elizondo MD    lanthanum (FOSRENOL) chewable tablet 1,000 mg, 1,000 mg, Oral, TID WC, Fanny Elizondo MD, 1,000 mg at 07/14/22 1125    insulin glargine (LANTUS) injection vial 5 Units, 5 Units, SubCUTAneous, Nightly, Georgie Parada MD    glucose chewable tablet 16 g, 4 tablet, Oral, PRN, Mayte Schaefer MD, 16 g at 07/11/22 5825    dextrose bolus 10% 125 mL, 125 mL, IntraVENous, PRN **OR** dextrose bolus 10% 250 mL, 250 mL, IntraVENous, PRN, Fanny Elizondo MD    glucagon (rDNA) injection 1 mg, 1 mg, IntraMUSCular, PRN, Fanny Elizondo MD    dextrose 5 % solution, 100 mL/hr, IntraVENous, PRN, Fanny Elizondo MD    0.9 % sodium chloride bolus, 500 mL, IntraVENous, Once, Franck Alfaro MD    Physical Exam:  BP (!) 99/46   Pulse 52   Temp 96.8 °F (36 °C) (Temporal)   Resp 17   Ht 5' 10\" (1.778 m)   Wt 144 lb 13.5 oz (65.7 kg)   SpO2 100%   BMI 20.78 kg/m²   Wt Readings from Last 3 Encounters:   07/13/22 144 lb 13.5 oz (65.7 kg)   06/28/22 151 lb (68.5 kg)   05/11/22 175 lb 0.7 oz (79.4 kg)     Appearance: Awake, alert, no acute respiratory distress  Skin: Intact, no rash  Head: Normocephalic, atraumatic  Eyes: EOMI, no conjunctival erythema  ENMT: No pharyngeal erythema, MMM, no rhinorrhea  Neck: Supple, no elevated JVP, no carotid bruits  Lungs: Clear to auscultation bilaterally. No wheezes, rales, or rhonchi. Cardiac: PMI nondisplaced, Regular rhythm with a normal rate, S1 & S2 normal, no murmurs  Abdomen: Soft, nontender, +bowel sounds  Extremities: Moves all extremities x 4, no lower extremity edema  Neurologic: No focal motor deficits apparent, normal mood and affect  Peripheral Pulses: Intact posterior tibial pulses bilaterally    Intake/Output:    Intake/Output Summary (Last 24 hours) at 7/14/2022 1242  Last data filed at 7/14/2022 1030  Gross per 24 hour   Intake 4264.96 ml   Output --   Net 4264.96 ml     I/O this shift:  In: 178.2 [P.O.:120; I.V.:58.2]  Out: -     Laboratory Tests:  Recent Labs     07/12/22  0611 07/12/22  0611 07/13/22  0601 07/13/22  1524 07/14/22  0507     --  137  --  137   K 5.6*   < > 6.2* 4.3 4.4     --  103  --  104   CO2 21*  --  19*  --  24   BUN 41*  --  47*  --  24*   CREATININE 4.2*  --  4.7*  --  3.2*   GLUCOSE 143*  --  101*  --  102*   CALCIUM 9.3  --  9.4  --  8.9    < > = values in this interval not displayed.      Lab Results   Component Value Date/Time    MG 1.9 07/14/2022 05:07 AM     Recent Labs     07/12/22  0611 07/13/22  0601 07/14/22  0507   ALKPHOS 202* 186* 153*   ALT 97* 89* 59*   * 82* 51*   PROT 6.0* 5.7* 5.2*   BILITOT 0.3 0.3 0.4   LABALBU 3.2* 3.1* 2.9*     Recent Labs     07/12/22  5937 07/13/22  0601 07/14/22  0507   WBC 5.5 6.6 4.2*   RBC 4.62 4.12 3.19*   HGB 12.9 11.7 8.9*   HCT 41.5 37.1 28.5*   MCV 89.8 90.0 89.3   MCH 27.9 28.4 27.9   MCHC 31.1* 31.5* 31.2*   RDW 17.2* 17.3* 17.2*   * 122* 79*   MPV NOT CALC 12.8* NOT CALC     Lab Results   Component Value Date    CKTOTAL 25 07/11/2022    CKMB 2.7 05/20/2021    TROPONINI 0.83 (H) 05/20/2021    TROPONINI 0.81 (H) 05/20/2021    TROPONINI 0.82 (H) 05/19/2021     Lab Results   Component Value Date    INR 1.1 06/15/2022    INR 1.0 01/20/2022    INR 1.0 12/02/2021    PROTIME 12.3 06/15/2022    PROTIME 11.1 01/20/2022    PROTIME 10.9 12/02/2021     Lab Results   Component Value Date    TSH 4.170 07/11/2022     Lab Results   Component Value Date    LABA1C 5.1 05/04/2022     No results found for: EAG  Lab Results   Component Value Date    CHOL 103 05/04/2022    CHOL 100 04/15/2022    CHOL 101 05/19/2021     Lab Results   Component Value Date    TRIG 66 05/04/2022    TRIG 107 04/15/2022    TRIG 106 05/19/2021     Lab Results   Component Value Date    HDL 42 05/04/2022    HDL 27 04/15/2022    HDL 38 05/19/2021     Lab Results   Component Value Date    LDLCALC 48 05/04/2022    LDLCALC 52 04/15/2022    LDLCALC 42 05/19/2021     Lab Results   Component Value Date    LABVLDL 13 05/04/2022    LABVLDL 21 04/15/2022    LABVLDL 21 05/19/2021     No results found for: CHOLHDLRATIO  No results for input(s): PROBNP in the last 72 hours. Cardiac Tests:    1. Prior cardiac work-up and history:  Lexiscan stress test May 19, 2021, abnormal with a large fixed defect in the apical and septal wall small fixed defect in the inferior apical wall partially reversible defect in the small area of the anterior lateral wall and EF 25% with apical septal akinesis and moderate global hypokinesis and dilated left ventricle during stress and rest and was a high risk study  2.  Left heart cath May 2021 she underwent stenting to the LAD and RCA. CONCLUSION: Coronary artery disease: Left main:  20% eccentric mid vessel narrowing. LAD:  50% proximal vessel narrowing and 95% mid vessel stenosis. Trivial diagonal branch with 90% stenosis. LCX:  Occluded after a small caliber first marginal branch which is a chronic total occlusion.  The second larger marginal branch filled late. RCA:  Large, dominant vessel with 90% heavily calcified mid vessel stenosis.  50% disease at the level of the crux and 70% ostial stenosis of the posterior descending artery branch. Markedly elevated left ventricular end-diastolic pressure. Successful balloon angioplasty with deployment of drug-eluting coronary stent to the mid LAD with very good results. Successful balloon angioplasty with deployment of drug-eluting coronary stent to the mid RCA with poststent deployment dilatation with a larger high-pressure noncompliant balloon with good results. 3. 2D echocardiogram on May 21, 2021 left ventricle is dilated There is apical, septal and basal inferior wall severe hypokinesis to akinesis Ejection fraction is visually estimated at 30+/-5%. There is doppler evidence of stage III diastolic dysfunction. Normal right ventricular size. Right ventricle global systolic function is mildly reduced . The left atrium is moderately dilated. Severe posterior mitral annular calcification. Focal calcification mitral valve leaflets. Chordal calcification is present. A mobile well defined 1.0 cm by 0.5 cm echo density is noted on the ventricular aspect of the mitral valve suggestive of a fibroelastoma: clinical correlation is needed. No mitral stenosis. Mild mitral regurgitation. Mild to moderate tricuspid regurgitation. Moderate pulmonary hypertension. Aortic root is sclerotic and calcified.   4. 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  KIARA November 11, 2021 for mitral leaflet mass Normal LV function, normal RV function, no hemodynamically significant valve disease(mobile posterior leaflet echodensity with leaflet restriction and mild MR), no evidence of vegetations, no left atrial or left atrial appendage thrombus (no evidence of spontaneous echo contrast), no intraatrial shunting on bubble study, no significant atherosclerosis of the aorta  6. Left heart cath December 9, 2021 ANGIOGRAPHIC FINDINGS: The left main coronary artery arises normally from the left sinus of Valsalva.  It is a large vessel without any disease. Nona Loll is mild distal calcification.  It bifurcates into left anterior descending artery and left circumflex artery. The left anterior descending artery has moderate-to-severe proximal and ostial calcifications.  There is 20% ostial disease.  The rest of the vessel is mildly diffusely diseased.  There is patent stent in the mid segment.  The LAD gives off a few small diagonal branches.  The second one is totally occluded.  The rest are mildly diseased. The left circumflex artery is a large vessel that has total occlusion after the takeoff of the second OM branch.  The first OM branch is a mid size vessel that is mildly diseased.  The second one is the small that has chronic total occlusion with a faint left-to-left filling collaterals. The right coronary artery has inferior takeoff.  There are stents in the mid and proximal segments.  There is 80% in-stent stenosis in the mid segment.  The rest of the vessel has luminal irregularities.  It gives off good size right PDA and good size right PLV that has no significant CAD noted. Nona Loll is significant ostial RCA also noted as evident by damping of pressure on engagement and lack of contrast reflux. IMPRESSION: Mild disease involving left anterior descending artery with a patent stent in the mid segment. Totally occluded left circumflex artery. Total occlusion of the second OM branch with left-to-left collaterals. Significant ostial RCA and significant mid RCA disease as mentioned above.  RECOMMENDATIONS:  Continue current treatment as per CT Surgery.  Per CTS, she was scheduled for a CABG x2 With a saphenous vein graft to the RCA and a saphenous vein graft to the circumflex and excision of mitral valve mass  7. January 3, 2022 osteomyelitis left middle finger left hand, January 20, 2022 amputation left third digit distal phalanx for osteomyelitis (open heart postponed) patient stopped taking aspirin at that time but was only of Brilinta a day or 2  8. Chronic hemodialysis on Monday Wednesday and Friday/renal osteodystrophy  9. Anemia  10. Hospitalization April 15 through April 22 for osteomyelitis of the lumbar spine no abdominal  11. KIARA 4/19/2022 ([place]):  LV systolic function mildly reduced.  Ejection fraction is visually estimated at 40-45%.  Moderate mitral annular calcification.   Valvular and subvalvular calcification.  Mild mitral regurgitation.  No definitive evidence of papillary fibroelastoma.   12. Non-ST elevation MI cath / PCI 5/5/22 ( Dr. Taye Galan ): .  In-stent stenosis of mid RCA with successful balloon  angioplasty (pre-PCI LATOSHA-3 flow, post-PCI LATOSHA-3 flow, pre-PCI stenosis 90%, post-PCI stenosis less than 10%). 2.  Patent stent in the LAD. 3.  Totally occluded left circumflex artery.    Discharged to SNF for 6 weeks of antibiotics             Impression  1. Bradycardia and hypotension (with a history of hypertension) after taking 2 Percocet.  Hypotension resolved after fluid boluses, increasing midodrine  2. Elevated troponin with no chest pain, no acute coronary syndrome and a history of elevated troponins,, nonspecific T wave changes laterally which were present prior EKGs  3.  Ischemic cardiomyopathy coronary artery disease with PCI/drug-eluting stents to the LAD and RCA in May 2021 following a non-ST elevation MI, chronic circumflex occlusion known by left heart cath in May 2021 with a trivial diagonal branch 90%.  And a non-ST elevation MI May 5, 2022 with a patent stent to the LAD and status post in-stent stenosis of the mid RCA with successful balloon angioplasty  4. KIARA echocardiogram in April 2022 with an EF of 40 to 45% and moderate MAC and valvular calcification and mild mitral regurgitation and no evidence of a papillary fibroelastoma (KIARA in November 2021 showed a mobile posterior leaflet echodensity attached to the ventricular side of the posterior leaflet 1 x 1.2 cm with restrictive excursion and mild MR mild TR)  5. End-stage renal disease on dialysis  6. Mitral valve mass  7. Insulin-dependent diabetic with neuropathy retinopathy and nephropathy  8. Chronic anemia  9. Caodaism  10. Hyperlipidemia  11. Bladder cancer and status post chemotherapy osteomyelitis of the lumbar spine With a back fusion in April 2022 and a spinal cord stimulator for intractable back pain completed IV antibiotics  12. Right breast cancer status postmastectomy  13.  history of decompressive lumbar laminectomy, spinal cord stimulator  14. Status post amputation of left hand distal middle finger for osteomyelitis  15. History of obesity        Plans  1. Continue to hold beta-blockers  2. There is no indication for temporary pacemaker at this time. We will assess for chronotropic incompetence. 3. Midodrine has been increased to 10 mg 3 times daily. Can be uptitrated further if needed  4. Volume management per nephrology. continue Lipitor and aspirin. Continue Brilinta with recent PCI done in May. 5. Unable to use ACE inhibitors due to end-stage renal disease  6. Unable to use guideline directed medical therapy overall on account of hypotension and now on account of significant symptomatic bradycardia. 7. So far, her hypotension has responded to dopamine. Pressures are currently doing better. Patient continues to have sinus bradycardia. TSH is within normal limits. Will assess for chronotropic incompetence when able. We will continue monitoring on telemetry for any evidence of high-grade AV block.   8. Doing better on Frederick-Synephrine. Wean as able. And midodrine. Now at 15 3 times daily. 9. We will continue monitoring on telemetry.       Krysta Rizzo MD, [address] [facility]    NOTE: This report was transcribed using voice recognition software. Every effort was made to ensure accuracy; however, inadvertent computerized transcription errors may be present.

## 2022-07-14 NOTE — PATIENT CARE CONFERENCE
Intensive Care Daily Quality Rounding Checklist        ICU Team Members: Dr. Huey Gaucher; Clinical Pharmacist; Charge Nurse; Bedside Nurse        ICU Day #: 4     Intubation Date: N/A     Ventilator Day #: N/A     Central Line Insertion Date: July 11                                                    Day #: NUMBER: 4      Arterial Line Insertion Date: N/A                             Day #: N/A     Temporary Hemodialysis Catheter Insertion Date: Tessio LUE                             Day #: N/A     DVT Prophylaxis: Heparin SQ    GI Prophylaxis: Protonix     Gant Catheter Insertion Date: n/a                                        Day #: n/a                             Continued need (if yes, reason documented and discussed with physician): n/a     Skin Issues/ Wounds and ordered treatment discussed on rounds: Previous Mediport site/ finger tip amputation healed     Goals/ Plans for the Day: Daily labs, HD per Nephrology, heart rate management per Cardiology, wean Levophed drip as able

## 2022-07-14 NOTE — PROGRESS NOTES
Critical Care Team - Daily Progress Note         Date and time: 2022 10:53 AM  Patient's name:  Lenny Sommer  Medical Record Number: [id number]  Patient's account/billing number: [de-identified]  Patient's YOB: 1955  Age: 78 y.o. Date of Admission: 2022 12:11 AM  Length of stay during current admission: 3      Primary Care Physician: Shaina Valiente MD  ICU Attending Physician: Dr. Khalif Goldstein    Code Status: Full Code    Reason for ICU admission: hypotension      SUBJECTIVE:     OVERNIGHT EVENTS:       Still mildly hypotensive and bradycardiac with an inability to completely wean norepi. Mildly dizzy and nauseated overnight.       CURRENT VENTILATION STATUS:     [] Ventilator  [] BIPAP  [] Nasal Cannula [] Room Air      IF INTUBATED, ET TUBE MARKING AT LOWER LIP:       cms    SECRETIONS Amount:  [] Small [] Moderate  [] Large  [x] None  Color:     [] White [] Colored  [] Bloody    SEDATION:  RAAS Score:  [] Propofol gtt  [] Versed gtt  [] Ativan gtt   [x] No Sedation    PARALYZED:  [x] No    [] Yes      VASOPRESSORS:  [] No    [x] Yes    If yes -   [x] Levophed       [] Dopamine     [] Vasopressin       [] Dobutamine  [] Phenylephrine         [] Epinephrine    CENTRAL LINES:     [] No   [] Yes   (Date of Insertion:   )           If yes -     [] Right IJ     [] Left IJ [x] Right Femoral [] Left Femoral                   [] Right Subclavian [] Left Subclavian       SOTOMAYOR'S CATHETER:   [x] No   [] Yes  (Date of Insertion:   )     URINE OUTPUT:            [] Good   [] Low              [x] Anuric      OBJECTIVE:     VITAL SIGNS:  BP (!) 113/57   Pulse 53   Temp 96.8 °F (36 °C) (Temporal)   Resp 26   Ht 5' 10\" (1.778 m)   Wt 144 lb 13.5 oz (65.7 kg)   SpO2 98%   BMI 20.78 kg/m²   Tmax over 24 hours:  Temp (24hrs), Av.1 °F (36.7 °C), Min:96.8 °F (36 °C), Max:98.6 °F (37 °C)      Patient Vitals for the past 6 hrs:   BP Temp Temp src Pulse Resp SpO2   22 0846 -- 96.8 °F (36 °C) Temporal -- -- --   07/14/22 0800 (!) 113/57 96.8 °F (36 °C) Axillary 53 26 98 %   07/14/22 0600 (!) 117/55 98.6 °F (37 °C) Axillary 54 25 97 %   07/14/22 0500 (!) 108/52 -- -- 53 20 --         Intake/Output Summary (Last 24 hours) at 7/14/2022 1053  Last data filed at 7/14/2022 0824  Gross per 24 hour   Intake 4506.74 ml   Output 400 ml   Net 4106.74 ml     Wt Readings from Last 2 Encounters:   07/13/22 144 lb 13.5 oz (65.7 kg)   06/28/22 151 lb (68.5 kg)     Body mass index is 20.78 kg/m².         PHYSICAL EXAMINATION:    General appearance - alert, no distress  Mental status - alert, oriented to person, place, and time  Eyes - pupils equal and reactive, extraocular eye movements intact  Ears - right ear normal, left ear normal  Nose - normal and patent, no erythema, discharge or polyps  Mouth - mucous membranes moist, pharynx normal without lesions  Neck - supple, no significant adenopathy  Chest - clear to auscultation, no wheezes, rales or rhonchi, symmetric air entry  Heart - bradycardiac, normal rhythm  Abdomen - soft, nontender, nondistended, no masses or organomegaly  Neurological - alert, oriented, normal speech, no focal findings or movement disorder noted}  Extremities - no edema, right femoral site clean with no further bleeding  Skin - no rashes     Any additional physical findings:    MEDICATIONS:    Scheduled Meds:   pantoprazole  40 mg Oral QAM AC    midodrine  15 mg Oral TID WC    latanoprost  1 drop Both Eyes Daily    brimonidine  1 drop Right Eye BID    vancomycin  1,000 mg IntraVENous Q MWF    allopurinol  100 mg Oral Daily    atorvastatin  80 mg Oral Nightly    doxycycline hyclate  100 mg Oral 2 times per day    gabapentin  200 mg Oral TID    ticagrelor  90 mg Oral BID    sodium chloride flush  5-40 mL IntraVENous 2 times per day    aspirin  81 mg Oral Daily    insulin lispro  0-6 Units SubCUTAneous TID WC    insulin lispro  0-3 Units SubCUTAneous Nightly    lanthanum BILITOT 0.3   < > 0.3   < >  --  0.4   ALKPHOS 202*   < > 186*   < >  --  153*   *   < > 82*   < >  --  51*   ALT 97*   < > 89*   < >  --  59*    < > = values in this interval not displayed. Magnesium:   Lab Results   Component Value Date/Time    MG 1.9 07/14/2022 05:07 AM     Phosphorus:   Lab Results   Component Value Date/Time    PHOS 4.1 07/14/2022 05:07 AM     Ionized Calcium:   Lab Results   Component Value Date/Time    CAION 1.22 11/08/2017 04:28 AM        Urinalysis:     Troponin: No results for input(s): TROPONINI in the last 72 hours. Microbiology:    Cultures during this admission:     Blood cultures:                 [] None drawn      [x] Negative             []  Positive (Details:  )  Urine Culture:                   [x] None drawn      [] Negative             []  Positive (Details:  )  Sputum Culture:               [x] None drawn       [] Negative             []  Positive (Details:  )   Endotracheal aspirate:     [x] None drawn       [] Negative             []  Positive (Details:  )     Other pertinent Labs:       Radiology/Imaging:              ASSESSMENT:     Principal Problem:    Hypotension  Active Problems:    Atherosclerosis of native coronary artery of native heart without angina pectoris    Vertebral osteomyelitis, chronic (HCC)    Symptomatic sinus bradycardia    Chronic diastolic CHF (congestive heart failure) ([facility])    ESRD (end stage renal disease) on dialysis ([facility]    Mixed hyperlipidemia  Resolved Problems:    * No resolved hospital problems. *      Additional assessment:    · Improving lfts  · Hg dropping not sure of significance        PLAN:     WEAN PER PROTOCOL:  [] No   [] Yes  [x] N/A    DISCONTINUE ANY LABS:   [x] No   [] Yes    ICU PROPHYLAXIS:  Stress ulcer:  [x] PPI Agent  [] [id number] [] Sucralfate  [] Other:  VTE:   [] Enoxaparin  [] Unfract. Heparin Subcut  [x] EPC Cuffs    NUTRITION:  [] NPO [] Tube Feeding (Specify: ) [] TPN  [x] PO (Diet: ADULT DIET;  Regular; 4 carb choices (60 gm/meal))    HOME MEDICATIONS RECONCILED: [] No  [x] Yes    INSULIN DRIP:   [x] No   [] Yes    CONSULTATION NEEDED:  [x] No   [] Yes    FAMILY UPDATED:    [x] No   [] Yes    TRANSFER OUT OF ICU:   [x] No   [] Yes    ADDITIONAL PLAN:  1. Continue norepi  2. Continue midodrine  3. HD tomorrow  4. Trend hg but pt is a [employer]. 30 min CCT        Agustni De Jesus MD, M.D. [facility]                     7/14/2022, 10:53 AM

## 2022-07-15 LAB
ALBUMIN SERPL-MCNC: 2.7 G/DL (ref 3.5–5.2)
ALP BLD-CCNC: 146 U/L (ref 35–104)
ALT SERPL-CCNC: 55 U/L (ref 0–32)
ANION GAP SERPL CALCULATED.3IONS-SCNC: 13 MMOL/L (ref 7–16)
ANISOCYTOSIS: ABNORMAL
AST SERPL-CCNC: 47 U/L (ref 0–31)
BASOPHILIC STIPPLING: ABNORMAL
BASOPHILS ABSOLUTE: 0 E9/L (ref 0–0.2)
BASOPHILS RELATIVE PERCENT: 0 % (ref 0–2)
BILIRUB SERPL-MCNC: 0.3 MG/DL (ref 0–1.2)
BUN BLDV-MCNC: 28 MG/DL (ref 6–23)
CALCIUM SERPL-MCNC: 8.6 MG/DL (ref 8.6–10.2)
CHLORIDE BLD-SCNC: 106 MMOL/L (ref 98–107)
CO2: 21 MMOL/L (ref 22–29)
CREAT SERPL-MCNC: 3.9 MG/DL (ref 0.5–1)
EOSINOPHILS ABSOLUTE: 0.06 E9/L (ref 0.05–0.5)
EOSINOPHILS RELATIVE PERCENT: 1.7 % (ref 0–6)
GFR AFRICAN AMERICAN: 14
GFR NON-AFRICAN AMERICAN: 14 ML/MIN/1.73
GLUCOSE BLD-MCNC: 96 MG/DL (ref 74–99)
HCT VFR BLD CALC: 26.1 % (ref 34–48)
HEMOGLOBIN: 8.2 G/DL (ref 11.5–15.5)
HYPOCHROMIA: ABNORMAL
LYMPHOCYTES ABSOLUTE: 0.32 E9/L (ref 1.5–4)
LYMPHOCYTES RELATIVE PERCENT: 8.8 % (ref 20–42)
MAGNESIUM: 1.9 MG/DL (ref 1.6–2.6)
MCH RBC QN AUTO: 28.4 PG (ref 26–35)
MCHC RBC AUTO-ENTMCNC: 31.4 % (ref 32–34.5)
MCV RBC AUTO: 90.3 FL (ref 80–99.9)
METER GLUCOSE: 111 MG/DL (ref 74–99)
METER GLUCOSE: 90 MG/DL (ref 74–99)
METER GLUCOSE: 98 MG/DL (ref 74–99)
MONOCYTES ABSOLUTE: 0 E9/L (ref 0.1–0.95)
MONOCYTES RELATIVE PERCENT: 0 % (ref 2–12)
NEUTROPHILS ABSOLUTE: 3.24 E9/L (ref 1.8–7.3)
NEUTROPHILS RELATIVE PERCENT: 89.5 % (ref 43–80)
NUCLEATED RED BLOOD CELLS: 1.8 /100 WBC
OVALOCYTES: ABNORMAL
PDW BLD-RTO: 17.4 FL (ref 11.5–15)
PHOSPHORUS: 4.4 MG/DL (ref 2.5–4.5)
PLATELET # BLD: 67 E9/L (ref 130–450)
PLATELET CONFIRMATION: NORMAL
PMV BLD AUTO: 12.9 FL (ref 7–12)
POIKILOCYTES: ABNORMAL
POLYCHROMASIA: ABNORMAL
POTASSIUM SERPL-SCNC: 4.1 MMOL/L (ref 3.5–5)
RBC # BLD: 2.89 E12/L (ref 3.5–5.5)
SCHISTOCYTES: ABNORMAL
SODIUM BLD-SCNC: 140 MMOL/L (ref 132–146)
TARGET CELLS: ABNORMAL
TOTAL PROTEIN: 4.8 G/DL (ref 6.4–8.3)
WBC # BLD: 3.6 E9/L (ref 4.5–11.5)

## 2022-07-15 PROCEDURE — 6370000000 HC RX 637 (ALT 250 FOR IP)

## 2022-07-15 PROCEDURE — 2700000000 HC OXYGEN THERAPY PER DAY

## 2022-07-15 PROCEDURE — 36591 DRAW BLOOD OFF VENOUS DEVICE: CPT

## 2022-07-15 PROCEDURE — 2580000003 HC RX 258

## 2022-07-15 PROCEDURE — 36592 COLLECT BLOOD FROM PICC: CPT

## 2022-07-15 PROCEDURE — 84100 ASSAY OF PHOSPHORUS: CPT

## 2022-07-15 PROCEDURE — 83735 ASSAY OF MAGNESIUM: CPT

## 2022-07-15 PROCEDURE — 99233 SBSQ HOSP IP/OBS HIGH 50: CPT | Performed by: INTERNAL MEDICINE

## 2022-07-15 PROCEDURE — 99232 SBSQ HOSP IP/OBS MODERATE 35: CPT | Performed by: FAMILY MEDICINE

## 2022-07-15 PROCEDURE — 36415 COLL VENOUS BLD VENIPUNCTURE: CPT

## 2022-07-15 PROCEDURE — 6360000002 HC RX W HCPCS

## 2022-07-15 PROCEDURE — 6370000000 HC RX 637 (ALT 250 FOR IP): Performed by: INTERNAL MEDICINE

## 2022-07-15 PROCEDURE — 80053 COMPREHEN METABOLIC PANEL: CPT

## 2022-07-15 PROCEDURE — 6360000002 HC RX W HCPCS: Performed by: INTERNAL MEDICINE

## 2022-07-15 PROCEDURE — 82962 GLUCOSE BLOOD TEST: CPT

## 2022-07-15 PROCEDURE — 2000000000 HC ICU R&B

## 2022-07-15 PROCEDURE — 85025 COMPLETE CBC W/AUTO DIFF WBC: CPT

## 2022-07-15 PROCEDURE — 2580000003 HC RX 258: Performed by: INTERNAL MEDICINE

## 2022-07-15 RX ORDER — SODIUM CHLORIDE 9 MG/ML
INJECTION, SOLUTION INTRAVENOUS ONCE
Status: COMPLETED | OUTPATIENT
Start: 2022-07-15 | End: 2022-07-15

## 2022-07-15 RX ADMIN — SODIUM CHLORIDE, PRESERVATIVE FREE 10 ML: 5 INJECTION INTRAVENOUS at 21:26

## 2022-07-15 RX ADMIN — TICAGRELOR 90 MG: 90 TABLET ORAL at 09:38

## 2022-07-15 RX ADMIN — GABAPENTIN 200 MG: 100 CAPSULE ORAL at 09:38

## 2022-07-15 RX ADMIN — DOXYCYCLINE HYCLATE 100 MG: 100 CAPSULE ORAL at 09:38

## 2022-07-15 RX ADMIN — LANTHANUM CARBONATE 1000 MG: 500 TABLET, CHEWABLE ORAL at 18:27

## 2022-07-15 RX ADMIN — ALLOPURINOL 100 MG: 100 TABLET ORAL at 09:38

## 2022-07-15 RX ADMIN — LANTHANUM CARBONATE 1000 MG: 500 TABLET, CHEWABLE ORAL at 11:15

## 2022-07-15 RX ADMIN — MIDODRINE HYDROCHLORIDE 15 MG: 5 TABLET ORAL at 12:17

## 2022-07-15 RX ADMIN — MIDODRINE HYDROCHLORIDE 15 MG: 5 TABLET ORAL at 18:27

## 2022-07-15 RX ADMIN — DOXYCYCLINE HYCLATE 100 MG: 100 CAPSULE ORAL at 21:34

## 2022-07-15 RX ADMIN — SODIUM CHLORIDE: 9 INJECTION, SOLUTION INTRAVENOUS at 12:35

## 2022-07-15 RX ADMIN — SODIUM CHLORIDE: 9 INJECTION, SOLUTION INTRAVENOUS at 11:03

## 2022-07-15 RX ADMIN — SODIUM CHLORIDE: 9 INJECTION, SOLUTION INTRAVENOUS at 05:21

## 2022-07-15 RX ADMIN — TICAGRELOR 90 MG: 90 TABLET ORAL at 21:24

## 2022-07-15 RX ADMIN — SODIUM CHLORIDE, PRESERVATIVE FREE 10 ML: 5 INJECTION INTRAVENOUS at 09:50

## 2022-07-15 RX ADMIN — LATANOPROST 1 DROP: 50 SOLUTION OPHTHALMIC at 21:26

## 2022-07-15 RX ADMIN — MIDODRINE HYDROCHLORIDE 15 MG: 5 TABLET ORAL at 09:38

## 2022-07-15 RX ADMIN — ONDANSETRON 4 MG: 2 INJECTION INTRAMUSCULAR; INTRAVENOUS at 21:32

## 2022-07-15 RX ADMIN — GABAPENTIN 200 MG: 100 CAPSULE ORAL at 21:25

## 2022-07-15 RX ADMIN — ATORVASTATIN CALCIUM 80 MG: 40 TABLET, FILM COATED ORAL at 21:25

## 2022-07-15 RX ADMIN — EPOETIN ALFA-EPBX 3000 UNITS: 3000 INJECTION, SOLUTION INTRAVENOUS; SUBCUTANEOUS at 09:38

## 2022-07-15 RX ADMIN — ASPIRIN 81 MG: 81 TABLET, COATED ORAL at 09:38

## 2022-07-15 RX ADMIN — BRIMONIDINE TARTRATE 1 DROP: 2 SOLUTION OPHTHALMIC at 21:26

## 2022-07-15 RX ADMIN — BRIMONIDINE TARTRATE 1 DROP: 2 SOLUTION OPHTHALMIC at 09:49

## 2022-07-15 RX ADMIN — PANTOPRAZOLE SODIUM 40 MG: 40 TABLET, DELAYED RELEASE ORAL at 05:21

## 2022-07-15 RX ADMIN — LANTHANUM CARBONATE 1000 MG: 500 TABLET, CHEWABLE ORAL at 09:39

## 2022-07-15 ASSESSMENT — PAIN SCALES - GENERAL
PAINLEVEL_OUTOF10: 0

## 2022-07-15 ASSESSMENT — ENCOUNTER SYMPTOMS
ANAL BLEEDING: 0
CONSTIPATION: 1
BLOOD IN STOOL: 0
CHEST TIGHTNESS: 0
VOMITING: 0
DIARRHEA: 0
TROUBLE SWALLOWING: 0
ABDOMINAL DISTENTION: 0
NAUSEA: 0
SHORTNESS OF BREATH: 0
ABDOMINAL PAIN: 1
CHOKING: 0

## 2022-07-15 NOTE — PROGRESS NOTES
INPATIENT CARDIOLOGY FOLLOW-UP    Name: Brando Austin    Age: 78 y.o. Date of Admission: 7/11/2022 12:11 AM    Date of Service: 7/15/2022    Primary Cardiologist: Dr. Fatou Murry    Chief Complaint: Follow-up for symptomatic bradycardia    Interim History:  No new overnight cardiac complaints. Currently with no complaints of CP, SOB, palpitations, dizziness, or lightheadedness. Sinus bradycardia on telemetry. Rates have been in the 45s. No evidence of high-grade AV block. Weaned off Frederick-Synephrine. Pressures have been borderline low.     Review of Systems:   Negative except as described above    Problem List:  Patient Active Problem List   Diagnosis    Diabetic retinopathy ([address]    Malignant neoplasm of right female breast ([address]    Atherosclerosis of native coronary artery of native heart without angina pectoris    Moderate obesity    Left ventricular hypertrophy    Herniated lumbar intervertebral disc    Lumbar degenerative disc disease    Pseudomeningocele    Lumbar radiculopathy    Lymphedema of arm    Anemia of chronic disease    Chronic diastolic CHF (congestive heart failure) (HCC)    Hypertension    Glaucoma    Refusal of blood product    Elevated troponin    Controlled type 2 diabetes mellitus with chronic kidney disease on chronic dialysis, with long-term current use of insulin (HCC)    Vitreous hemorrhage ([address]    Patient is [employer]    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 (HCC)    Chronic pain syndrome    Lumbar post-laminectomy syndrome    Cervicalgia    Diabetic peripheral neuropathy (HCC)    ESRD (end stage renal disease) (HCC)    Bilateral carpal tunnel syndrome    Cardiac arrest ([address]    ESRD (end stage renal disease) on dialysis ([address]    Mixed hyperlipidemia    Lymphedema of right upper extremity    Coronary artery disease involving native coronary artery of native heart with angina pectoris ([address]    Mitral valve disease    CAD in native artery (VIBRAMYCIN) capsule 100 mg, 100 mg, Oral, 2 times per day, Rosibel Butts MD, 100 mg at 07/15/22 0938    gabapentin (NEURONTIN) capsule 200 mg, 200 mg, Oral, TID, Rosibel Butts MD, 200 mg at 07/15/22 6975    ticagrelor (BRILINTA) tablet 90 mg, 90 mg, Oral, BID, Rosibel Butts MD, 90 mg at 07/15/22 0938    sodium chloride flush 0.9 % injection 5-40 mL, 5-40 mL, IntraVENous, 2 times per day, Rosibel Butts MD, 10 mL at 07/15/22 0950    sodium chloride flush 0.9 % injection 5-40 mL, 5-40 mL, IntraVENous, PRN, Rosibel Butts MD    0.9 % sodium chloride infusion, , IntraVENous, PRN, Fanny Elizondo MD    ondansetron (ZOFRAN-ODT) disintegrating tablet 4 mg, 4 mg, Oral, Q8H PRN **OR** ondansetron (ZOFRAN) injection 4 mg, 4 mg, IntraVENous, Q6H PRN, Rosibel Butts MD, 4 mg at 07/11/22 1756    polyethylene glycol (GLYCOLAX) packet 17 g, 17 g, Oral, Daily PRN, Fanny Elizondo MD    acetaminophen (TYLENOL) tablet 650 mg, 650 mg, Oral, Q6H PRN **OR** acetaminophen (TYLENOL) suppository 650 mg, 650 mg, Rectal, Q6H PRN, Rosibel Butts MD    aspirin EC tablet 81 mg, 81 mg, Oral, Daily, Fanny Elizondo MD, 81 mg at 07/15/22 0938    insulin lispro (HUMALOG) injection vial 0-6 Units, 0-6 Units, SubCUTAneous, TID , Rosibel Butts MD, 1 Units at 07/14/22 1658    insulin lispro (HUMALOG) injection vial 0-3 Units, 0-3 Units, SubCUTAneous, Nightly, Fanny Elizondo MD    lanthanum (FOSRENOL) chewable tablet 1,000 mg, 1,000 mg, Oral, TID WC, Fanny Elizondo MD, 1,000 mg at 07/15/22 1115    insulin glargine (LANTUS) injection vial 5 Units, 5 Units, SubCUTAneous, Nightly, [address] MD    glucose chewable tablet 16 g, 4 tablet, Oral, PRN, Rosibel Butts MD, 16 g at 07/11/22 1755    dextrose bolus 10% 125 mL, 125 mL, IntraVENous, PRN **OR** dextrose bolus 10% 250 mL, 250 mL, IntraVENous, PRN, Fanny Elizondo MD    glucagon (rDNA) injection 1 mg, 1 mg, IntraMUSCular, PRN, Fanny Elizondo MD    dextrose 5 % solution, 100 mL/hr, IntraVENous, PRN, Fanny Elizondo MD    0.9 % sodium chloride bolus, 500 mL, IntraVENous, Once, Vilinda Goltz, MD    Physical Exam:  BP (!) 69/30   Pulse (!) 48   Temp 98.4 °F (36.9 °C) (Axillary)   Resp 29   Ht 5' 10\" (1.778 m)   Wt 144 lb 13.5 oz (65.7 kg)   SpO2 96%   BMI 20.78 kg/m²   Wt Readings from Last 3 Encounters:   07/13/22 144 lb 13.5 oz (65.7 kg)   06/28/22 151 lb (68.5 kg)   05/11/22 175 lb 0.7 oz (79.4 kg)     Appearance: Awake, alert, no acute respiratory distress  Skin: Intact, no rash  Head: Normocephalic, atraumatic  Eyes: EOMI, no conjunctival erythema  ENMT: No pharyngeal erythema, MMM, no rhinorrhea  Neck: Supple, no elevated JVP, no carotid bruits  Lungs: Clear to auscultation bilaterally. No wheezes, rales, or rhonchi. Cardiac: PMI nondisplaced, Regular rhythm with a normal rate, S1 & S2 normal, no murmurs  Abdomen: Soft, nontender, +bowel sounds  Extremities: Moves all extremities x 4, no lower extremity edema  Neurologic: No focal motor deficits apparent, normal mood and affect  Peripheral Pulses: Intact posterior tibial pulses bilaterally    Intake/Output:    Intake/Output Summary (Last 24 hours) at 7/15/2022 1708  Last data filed at 7/15/2022 0522  Gross per 24 hour   Intake 1468.91 ml   Output --   Net 1468.91 ml     No intake/output data recorded.     Laboratory Tests:  Recent Labs     07/13/22  0601 07/13/22  1524 07/14/22  0507 07/15/22  0520     --  137 140   K 6.2* 4.3 4.4 4.1     --  104 106   CO2 19*  --  24 21*   BUN 47*  --  24* 28*   CREATININE 4.7*  --  3.2* 3.9*   GLUCOSE 101*  --  102* 96   CALCIUM 9.4  --  8.9 8.6     Lab Results   Component Value Date/Time    MG 1.9 07/15/2022 05:20 AM     Recent Labs     07/13/22  0601 07/14/22  0507 07/15/22  0520   ALKPHOS 186* 153* 146*   ALT 89* 59* 55*   AST 82* 51* 47*   PROT 5.7* 5.2* 4.8*   BILITOT 0.3 0.4 0.3   LABALBU 3.1* 2.9* 2.7*     Recent Labs     07/13/22  0601 07/14/22  0507 07/15/22  0520   WBC 6.6 4.2* 3.6*   RBC 4.12 3.19* 2.89*   HGB 11.7 8.9* 8.2*   HCT 37.1 28.5* 26.1*   MCV 90.0 89.3 90.3   MCH 28.4 27.9 28.4   MCHC 31.5* 31.2* 31.4*   RDW 17.3* 17.2* 17.4*   * 79* 67*   MPV 12.8* NOT CALC 12.9*     Lab Results   Component Value Date    CKTOTAL 25 07/11/2022    CKMB 2.7 05/20/2021    TROPONINI 0.83 (H) 05/20/2021    TROPONINI 0.81 (H) 05/20/2021    TROPONINI 0.82 (H) 05/19/2021     Lab Results   Component Value Date    INR 1.1 06/15/2022    INR 1.0 01/20/2022    INR 1.0 12/02/2021    PROTIME 12.3 06/15/2022    PROTIME 11.1 01/20/2022    PROTIME 10.9 12/02/2021     Lab Results   Component Value Date    TSH 4.170 07/11/2022     Lab Results   Component Value Date    LABA1C 5.1 05/04/2022     No results found for: EAG  Lab Results   Component Value Date    CHOL 103 05/04/2022    CHOL 100 04/15/2022    CHOL 101 05/19/2021     Lab Results   Component Value Date    TRIG 66 05/04/2022    TRIG 107 04/15/2022    TRIG 106 05/19/2021     Lab Results   Component Value Date    HDL 42 05/04/2022    HDL 27 04/15/2022    HDL 38 05/19/2021     Lab Results   Component Value Date    LDLCALC 48 05/04/2022    LDLCALC 52 04/15/2022    LDLCALC 42 05/19/2021     Lab Results   Component Value Date    LABVLDL 13 05/04/2022    LABVLDL 21 04/15/2022    LABVLDL 21 05/19/2021     No results found for: CHOLHDLRATIO  No results for input(s): PROBNP in the last 72 hours. Cardiac Tests:    Prior cardiac work-up and history:  Lexiscan stress test May 19, 2021, abnormal with a large fixed defect in the apical and septal wall small fixed defect in the inferior apical wall partially reversible defect in the small area of the anterior lateral wall and EF 25% with apical septal akinesis and moderate global hypokinesis and dilated left ventricle during stress and rest and was a high risk study  Left heart cath May 2021 she underwent stenting to the LAD and RCA. CONCLUSION: Coronary artery disease: Left main:  20% eccentric mid vessel narrowing.  LAD:  50% proximal vessel narrowing and 95% mid vessel stenosis. Trivial diagonal branch with 90% stenosis. LCX:  Occluded after a small caliber first marginal branch which is a chronic total occlusion. The second larger marginal branch filled late. RCA:  Large, dominant vessel with 90% heavily calcified mid vessel stenosis. 50% disease at the level of the crux and 70% ostial stenosis of the posterior descending artery branch. Markedly elevated left ventricular end-diastolic pressure. Successful balloon angioplasty with deployment of drug-eluting coronary stent to the mid LAD with very good results. Successful balloon angioplasty with deployment of drug-eluting coronary stent to the mid RCA with poststent deployment dilatation with a larger high-pressure noncompliant balloon with good results. 2D echocardiogram on May 21, 2021 left ventricle is dilated There is apical, septal and basal inferior wall severe hypokinesis to akinesis Ejection fraction is visually estimated at 30+/-5%. There is doppler evidence of stage III diastolic dysfunction. Normal right ventricular size. Right ventricle global systolic function is mildly reduced . The left atrium is moderately dilated. Severe posterior mitral annular calcification. Focal calcification mitral valve leaflets. Chordal calcification is present. A mobile well defined 1.0 cm by 0.5 cm echo density is noted on the ventricular aspect of the mitral valve suggestive of a fibroelastoma: clinical correlation is needed. No mitral stenosis. Mild mitral regurgitation. Mild to moderate tricuspid regurgitation. Moderate pulmonary hypertension. Aortic root is sclerotic and calcified. 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 It is a large vessel without any disease. There is mild distal calcification. It bifurcates into left anterior descending artery and left circumflex artery. The left anterior descending artery has moderate-to-severe proximal and ostial calcifications. There is 20% ostial disease. The rest of the vessel is mildly diffusely diseased. There is patent stent in the mid segment. The LAD gives off a few small diagonal branches. The second one is totally occluded. The rest are mildly diseased. The left circumflex artery is a large vessel that has total occlusion after the takeoff of the second OM branch. The first OM branch is a mid size vessel that is mildly diseased. The second one is the small that has chronic total occlusion with a faint left-to-left filling collaterals. The right coronary artery has inferior takeoff. There are stents in the mid and proximal segments. There is 80% in-stent stenosis in the mid segment. The rest of the vessel has luminal irregularities. It gives off good size right PDA and good size right PLV that has no significant CAD noted. There is significant ostial RCA also noted as evident by damping of pressure on engagement and lack of contrast reflux. IMPRESSION: Mild disease involving left anterior descending artery with a patent stent in the mid segment. Totally occluded left circumflex artery. Total occlusion of the second OM branch with left-to-left collaterals. Significant ostial RCA and significant mid RCA disease as mentioned above. RECOMMENDATIONS:  Continue current treatment as per CT Surgery.   Per CTS, she was scheduled for a CABG x2 With a saphenous vein graft to the RCA and a saphenous vein graft to the circumflex and excision of mitral valve mass  January 3, 2022 osteomyelitis left middle finger left hand, January 20, 2022 amputation left third digit distal phalanx for osteomyelitis (open heart postponed) patient stopped taking aspirin at that time but was only of Brilinta a day or 2  Chronic hemodialysis on Monday Wednesday and Friday/renal osteodystrophy  Anemia  Hospitalization April 15 through April 22 for osteomyelitis of the lumbar spine no abdominal  KIARA 4/19/2022 Cipriano Jack):  LV systolic function mildly reduced. Ejection fraction is visually estimated at 40-45%. Moderate mitral annular calcification. Valvular and subvalvular calcification. Mild mitral regurgitation. No definitive evidence of papillary fibroelastoma. Non-ST elevation MI cath / PCI 5/5/22 ( Dr. Jacquie Niño ): . In-stent stenosis of mid RCA with successful balloon  angioplasty (pre-PCI LATOSHA-3 flow, post-PCI LATOSHA-3 flow, pre-PCI stenosis 90%, post-PCI stenosis less than 10%). 2.  Patent stent in the LAD. 3.  Totally occluded left circumflex artery. Discharged to SNF for 6 weeks of antibiotics             Impression  Bradycardia and hypotension (with a history of hypertension) after taking 2 Percocet. Hypotension resolved after fluid boluses, increasing midodrine  Elevated troponin with no chest pain, no acute coronary syndrome and a history of elevated troponins,, nonspecific T wave changes laterally which were present prior EKGs  Ischemic cardiomyopathy coronary artery disease with PCI/drug-eluting stents to the LAD and RCA in May 2021 following a non-ST elevation MI, chronic circumflex occlusion known by left heart cath in May 2021 with a trivial diagonal branch 90%.   And a non-ST elevation MI May 5, 2022 with a patent stent to the LAD and status post in-stent stenosis of the mid RCA with successful balloon angioplasty  KIARA echocardiogram in April 2022 with an EF of 40 to 45% and moderate MAC and valvular calcification and mild mitral regurgitation and no evidence of a papillary fibroelastoma (KIARA in November 2021 showed a mobile posterior leaflet echodensity attached to the ventricular side of the posterior leaflet 1 x 1.2 cm with restrictive excursion and mild MR mild TR)  End-stage renal disease on dialysis  Mitral valve mass  Insulin-dependent diabetic with neuropathy retinopathy and nephropathy  Chronic anemia  Shinto  Hyperlipidemia  Bladder cancer and status post chemotherapy osteomyelitis of the lumbar spine With a back fusion in April 2022 and a spinal cord stimulator for intractable back pain completed IV antibiotics  Right breast cancer status postmastectomy   history of decompressive lumbar laminectomy, spinal cord stimulator  Status post amputation of left hand distal middle finger for osteomyelitis  History of obesity        Plans  Continue to hold beta-blockers  There is no indication for temporary pacemaker at this time. We will assess for chronotropic incompetence. Midodrine has been increased to 15 mg 3 times daily. Can be uptitrated further to 20 mg 3 times daily if needed  Volume management per nephrology. continue Lipitor and aspirin. Continue Brilinta with recent PCI done in May. Unable to use ACE inhibitors due to end-stage renal disease  Unable to use guideline directed medical therapy overall on account of hypotension and now on account of significant symptomatic bradycardia. So far, her hypotension has responded to dopamine. Pressures are currently doing better. Patient continues to have sinus bradycardia. TSH is within normal limits. Will assess for chronotropic incompetence when able. We will continue monitoring on telemetry for any evidence of high-grade AV block. Doing better on Frederick-Synephrine. Wean as able. And midodrine. Now at 15 3 times daily. Can consider addition of Florinef to midodrine. I will sign off today.   Please call with questions or concerns. Jerie Halsted MD, [facility]    NOTE: This report was transcribed using voice recognition software. Every effort was made to ensure accuracy; however, inadvertent computerized transcription errors may be present.

## 2022-07-15 NOTE — PROGRESS NOTES
Department of Internal Medicine  Nephrology Attending Progress Note        SUBJECTIVE:  Mrs Nitin Obregon being followed for ESKD    7/15/22: Pt awake alert appropriate. On my arrival to see pt SBP 67 and repeat check 71 systolic. Pt states she does not feeel as good as yesterday. HR 48 during my visit      PMH   End-stage renal disease on dialysis   Hypertension  Hyperlipidemia  Type 2 diabetes with neuropathy, retinopathy  Anemia of chronic kidney disease (patient Jehovah witness)  Secondary hyperparathyroid disease of renal origin  History of breast cancer rt side  History of coronary disease status post heart cath with balloon angioplasty 22  Carpal tunnel syndrome with bilateral release  History of osteomyelitis/discitis  L1-L2 on IV Vanco for 6 weeks now on suppressive doxycycline  History of spinal fusion of L3/L4/L5  History of glaucoma    Physical Exam:    Vitals:    07/15/22 0600   BP: (!) 128/58   Pulse: 57   Resp: 16   Temp: 98.6 °F (37 °C)   SpO2: 98%   MAXIMUM TEMPERATURE OVER 24HRS:  Temp (24hrs), Av.9 °F (36.6 °C), Min:96.9 °F (36.1 °C), Max:98.6 °F (37 °C)    TEMPERATURE RANGE OVER 24HRS:   Temp  Av.9 °F (36.6 °C)  Min: 96.9 °F (36.1 °C)  Max: 98.6 °F (37 °C)  24HR RESPIRATORY RATE RANGE:  Resp  Av.6  Min: 16  Max: 33  24HR PULSE RANGE: Pulse  Av.3  Min: 49  Max: 57  CURRENT BLOOD PRESSURE:  BP: (!) 128/58    24HR BLOOD PRESSURE RANGE:  Systolic (05ZWZ), SSU:092 , Min:81 , JI   ; Diastolic (52WDJ), TGL:75, Min:39, Max:63    8HR BLOOD PRESSURE RANGE:  BP: ()/(39-59)   24HR PULSE OXIMETRY RANGE:  SpO2  Av.5 %  Min: 92 %  Max: 100 %  24HR INTAKE/OUTPUT:      Intake/Output Summary (Last 24 hours) at 7/15/2022 1054  Last data filed at 7/15/2022 0522  Gross per 24 hour   Intake 1468.91 ml   Output --   Net 1468.91 ml     8HR INTAKE OUTPUT:  No intake/output data recorded.   VENT SETTINGS:       Additional Respiratory Assessments  Heart Rate: 57  Resp: 16  SpO2: 98 %      General: Awake, alert, no acute distress  Neck: No JVD noted  Lungs: Diminished at the bases bilaterally. CV: Regular rate and rhythm but bradycardic. No rub  Abd: Soft, tender diffusely  nondistended. Active bowel sounds  Skin: Warm and dry. No rash on exposed extremities  Ext: 1+ RUE edema (h/o breast CA);  No BLE edema ; LUE AV fistula   Neuro: Awake, answers questions appropriately    DATA:    CBC with Differential:    Lab Results   Component Value Date/Time    WBC 3.6 07/15/2022 05:20 AM    RBC 2.89 07/15/2022 05:20 AM    HGB 8.2 07/15/2022 05:20 AM    HCT 26.1 07/15/2022 05:20 AM    PLT 67 07/15/2022 05:20 AM    MCV 90.3 07/15/2022 05:20 AM    MCH 28.4 07/15/2022 05:20 AM    MCHC 31.4 07/15/2022 05:20 AM    RDW 17.4 07/15/2022 05:20 AM    NRBC 1.8 07/15/2022 05:20 AM    SEGSPCT 70 03/12/2014 10:53 AM    BANDSPCT 1 02/18/2015 10:35 AM    LYMPHOPCT 8.8 07/15/2022 05:20 AM    PROMYELOPCT 1.8 02/18/2017 04:00 AM    MONOPCT 0.0 07/15/2022 05:20 AM    MYELOPCT 0.9 10/24/2017 10:45 AM    BASOPCT 0.0 07/15/2022 05:20 AM    MONOSABS 0.00 07/15/2022 05:20 AM    LYMPHSABS 0.32 07/15/2022 05:20 AM    EOSABS 0.06 07/15/2022 05:20 AM    BASOSABS 0.00 07/15/2022 05:20 AM     CMP:    Lab Results   Component Value Date/Time     07/15/2022 05:20 AM    K 4.1 07/15/2022 05:20 AM    K 4.7 07/11/2022 12:46 AM     07/15/2022 05:20 AM    CO2 21 07/15/2022 05:20 AM    BUN 28 07/15/2022 05:20 AM    CREATININE 3.9 07/15/2022 05:20 AM    GFRAA 14 07/15/2022 05:20 AM    LABGLOM 14 07/15/2022 05:20 AM    GLUCOSE 96 07/15/2022 05:20 AM    GLUCOSE 46 04/02/2012 11:00 AM    PROT 4.8 07/15/2022 05:20 AM    LABALBU 2.7 07/15/2022 05:20 AM    LABALBU 3.8 04/02/2012 11:00 AM    CALCIUM 8.6 07/15/2022 05:20 AM    BILITOT 0.3 07/15/2022 05:20 AM    ALKPHOS 146 07/15/2022 05:20 AM    AST 47 07/15/2022 05:20 AM    ALT 55 07/15/2022 05:20 AM            epoetin fani-epbx  3,000 Units SubCUTAneous Once per day on Mon Wed Fri    pantoprazole  40 mg Oral QAM AC    midodrine  15 mg Oral TID     latanoprost  1 drop Both Eyes Daily    brimonidine  1 drop Right Eye BID    vancomycin  1,000 mg IntraVENous Q MWF    allopurinol  100 mg Oral Daily    atorvastatin  80 mg Oral Nightly    doxycycline hyclate  100 mg Oral 2 times per day    gabapentin  200 mg Oral TID    ticagrelor  90 mg Oral BID    sodium chloride flush  5-40 mL IntraVENous 2 times per day    aspirin  81 mg Oral Daily    insulin lispro  0-6 Units SubCUTAneous TID     insulin lispro  0-3 Units SubCUTAneous Nightly    lanthanum  1,000 mg Oral TID     insulin glargine  5 Units SubCUTAneous Nightly    sodium chloride  500 mL IntraVENous Once      sodium chloride      norepinephrine Stopped (07/14/22 1700)    sodium chloride 75 mL/hr at 07/15/22 0522    sodium chloride      dextrose       sodium chloride flush, sodium chloride, ondansetron **OR** ondansetron, polyethylene glycol, acetaminophen **OR** acetaminophen, glucose, dextrose bolus **OR** dextrose bolus, glucagon (rDNA), dextrose    IMPRESSION/RECOMMENDATIONS:      1. End-stage renal disease with Intradialytic Hypotension  PLAN:  1. IHD on  Monday Wednesday Friday -given the hypotension this AM will hold treatment today  2. Follow labs  3. Will use cooled dialysate with next treatment    2. Hyperkalemia  K+ WNL 4.1  PLAN:  1. Follow K+    3. Hypotension with   Sinus bradycardia   metoprolol and timolol eye drops discontinued-remains bradycardic-concern for autonomic dysfunction-currently off pressor  PLAN:  1. Continue to monitor-plan as per cardiology    4. Anemia of chronic kidney disease with Thrombocytopenia and Leukopenia  HgB 11.7-->8.9 -->8.2 over 24hrs with decrease in -->79-->67   PLAN:  1. Continue JAYSHREE   2. Follow H/H    5. Secondary hyperparathyroidism of renal origin with Hyperphosphatemia  PO4 now WNL  PLAN:  1. Follow Ca++ and PO4  2.  Hold PO4 binder    Case discussed with Pulm/CCC    Chapito Miller Ari Guadalupe MD  7/15/2022 10:54 AM

## 2022-07-15 NOTE — FLOWSHEET NOTE
Inpatient Wound Care    Admit Date: 7/11/2022 12:11 AM    Reason for consult:  R chest    Significant history:  Admitted for hypotension. Wound history:  POA    Findings:     07/15/22 1529   Wound 07/15/22 Chest   Date First Assessed/Time First Assessed: 07/15/22 1529   Location: Chest   Wound Etiology Surgical   Dressing Status Intact   Wound Length (cm) 1 cm   Wound Width (cm) 1 cm   Wound Depth (cm) 0.5 cm   Wound Surface Area (cm^2) 1 cm^2   Wound Volume (cm^3) 0.5 cm^3   Wound Assessment Pink/red   Drainage Amount Scant   Drainage Description Serosanguinous   Odor None   Maria Esther-wound Assessment Intact       Impression:  R chest old mediport site    Interventions in place: Dressing intact with Aquacel    Plan:Daily dressing changes, Aquaphor to buttocks, SOS precautions.        Lluvia Ocampo RN 7/15/2022 3:31 PM

## 2022-07-15 NOTE — PLAN OF CARE
Problem: ABCDS Injury Assessment  Goal: Absence of physical injury  7/15/2022 0039 by Tiana Rodriguez RN  Outcome: Progressing  7/14/2022 1859 by Osmani Castellano RN  Outcome: Progressing     Problem: Skin/Tissue Integrity  Goal: Absence of new skin breakdown  Description: 1. Monitor for areas of redness and/or skin breakdown  2. Assess vascular access sites hourly  3. Every 4-6 hours minimum:  Change oxygen saturation probe site  4. Every 4-6 hours:  If on nasal continuous positive airway pressure, respiratory therapy assess nares and determine need for appliance change or resting period.   7/15/2022 0039 by Tiana Rodriguez RN  Outcome: Progressing  7/14/2022 1859 by Osmani Castellano RN  Outcome: Progressing     Problem: Discharge Planning  Goal: Discharge to home or other facility with appropriate resources  Outcome: Progressing     Problem: Safety - Adult  Goal: Free from fall injury  7/15/2022 0039 by Tiana Rodriguez RN  Outcome: Progressing  7/14/2022 1859 by Osmani Castellano RN  Outcome: Progressing     Problem: Chronic Conditions and Co-morbidities  Goal: Patient's chronic conditions and co-morbidity symptoms are monitored and maintained or improved  7/15/2022 0039 by Tiana Rodriguez RN  Outcome: Progressing  7/14/2022 1859 by Osmani Castellano RN  Outcome: Progressing     Problem: Pain  Goal: Verbalizes/displays adequate comfort level or baseline comfort level  Outcome: Progressing

## 2022-07-15 NOTE — PATIENT CARE CONFERENCE
Intensive Care Daily Quality Rounding Checklist        ICU Team Members: Dr. Grisel Simeon; Clinical Pharmacist; Charge Nurse; Bedside Nurse,resident      ICU Day #: 5     Intubation Date: N/A     Ventilator Day #: N/A     Central Line Insertion Date: July 11                                                    Day #: NUMBER: 5      Arterial Line Insertion Date: N/A                             Day #: N/A     Temporary Hemodialysis Catheter Insertion Date: Tessio LUE                             Day #: N/A     DVT Prophylaxis: Heparin SQ    GI Prophylaxis: Protonix     Gant Catheter Insertion Date: n/a                                        Day #: n/a                             Continued need (if yes, reason documented and discussed with physician): n/a     Skin Issues/ Wounds and ordered treatment discussed on rounds: Previous Mediport site dressing   Finger tip amputation healed     Goals/ Plans for the Day: Daily labs, HD per Nephrology

## 2022-07-15 NOTE — PROGRESS NOTES
[facility]  Progress Note    Chief complaint :  Chief Complaint   Patient presents with    Hypotension     given 2 percocet and 3 doses of midodrine       Subjective:    No overnight problems. Patient describes feeling better. She states that she is constipated. She endorses chronic abdominal and back pain. Patient denies chest pain, SOB, nausea, vomiting, bloody stools, fever, chills, changes in urination. Patient is tolerating diet. Patient is off pressor support. Past medical, surgical, family and social history were reviewed, non-contributory, and unchanged unless otherwise stated. Review of Systems   Constitutional:  Negative for fatigue and fever. HENT:  Negative for nosebleeds and trouble swallowing. Eyes:  Negative for visual disturbance. Respiratory:  Negative for choking, chest tightness and shortness of breath. Cardiovascular:  Negative for chest pain and leg swelling. Gastrointestinal:  Positive for abdominal pain and constipation. Negative for abdominal distention, anal bleeding, blood in stool, diarrhea, nausea and vomiting. Genitourinary:  Negative for difficulty urinating. Neurological:  Negative for light-headedness and headaches. Objective:  BP (!) 128/58   Pulse 57   Temp 98.6 °F (37 °C) (Axillary)   Resp 16   Ht 5' 10\" (1.778 m)   Wt 144 lb 13.5 oz (65.7 kg)   SpO2 98%   BMI 20.78 kg/m²     Physical Exam  Constitutional:       Appearance: Normal appearance. HENT:      Head: Normocephalic and atraumatic. Right Ear: External ear normal.      Left Ear: External ear normal.   Eyes:      Extraocular Movements: Extraocular movements intact. Conjunctiva/sclera: Conjunctivae normal.   Cardiovascular:      Rate and Rhythm: Regular rhythm. Bradycardia present. Heart sounds: No murmur heard. No friction rub. No gallop.    Pulmonary:      Effort: Pulmonary effort is normal.      Breath sounds: Normal breath sounds. Abdominal:      General: Abdomen is flat. There is no distension. Palpations: Abdomen is soft. There is no mass. Tenderness: There is abdominal tenderness (chronic, stable). Hernia: No hernia is present. Musculoskeletal:         General: No swelling or deformity. Normal range of motion. Cervical back: Normal range of motion. Skin:     General: Skin is warm and dry. Neurological:      General: No focal deficit present. Mental Status: She is alert. Psychiatric:         Mood and Affect: Mood normal.         Thought Content:  Thought content normal.       Labs:  Recent Results (from the past 24 hour(s))   POCT Glucose    Collection Time: 07/14/22 11:26 AM   Result Value Ref Range    Meter Glucose 113 (H) 74 - 99 mg/dL   POCT Glucose    Collection Time: 07/14/22  4:53 PM   Result Value Ref Range    Meter Glucose 145 (H) 74 - 99 mg/dL   POCT Glucose    Collection Time: 07/14/22  8:09 PM   Result Value Ref Range    Meter Glucose 124 (H) 74 - 99 mg/dL   CBC with Auto Differential    Collection Time: 07/15/22  5:20 AM   Result Value Ref Range    WBC 3.6 (L) 4.5 - 11.5 E9/L    RBC 2.89 (L) 3.50 - 5.50 E12/L    Hemoglobin 8.2 (L) 11.5 - 15.5 g/dL    Hematocrit 26.1 (L) 34.0 - 48.0 %    MCV 90.3 80.0 - 99.9 fL    MCH 28.4 26.0 - 35.0 pg    MCHC 31.4 (L) 32.0 - 34.5 %    RDW 17.4 (H) 11.5 - 15.0 fL    Platelets 67 (L) 570 - 450 E9/L    MPV 12.9 (H) 7.0 - 12.0 fL   Phosphorus    Collection Time: 07/15/22  5:20 AM   Result Value Ref Range    Phosphorus 4.4 2.5 - 4.5 mg/dL   Magnesium    Collection Time: 07/15/22  5:20 AM   Result Value Ref Range    Magnesium 1.9 1.6 - 2.6 mg/dL   Comprehensive Metabolic Panel    Collection Time: 07/15/22  5:20 AM   Result Value Ref Range    Sodium 140 132 - 146 mmol/L    Potassium 4.1 3.5 - 5.0 mmol/L    Chloride 106 98 - 107 mmol/L    CO2 21 (L) 22 - 29 mmol/L    Anion Gap 13 7 - 16 mmol/L    Glucose 96 74 - 99 mg/dL    BUN 28 (H) 6 - 23 mg/dL CREATININE 3.9 (H) 0.5 - 1.0 mg/dL    GFR Non-African American 14 >=60 mL/min/1.73    GFR African American 14     Calcium 8.6 8.6 - 10.2 mg/dL    Total Protein 4.8 (L) 6.4 - 8.3 g/dL    Albumin 2.7 (L) 3.5 - 5.2 g/dL    Total Bilirubin 0.3 0.0 - 1.2 mg/dL    Alkaline Phosphatase 146 (H) 35 - 104 U/L    ALT 55 (H) 0 - 32 U/L    AST 47 (H) 0 - 31 U/L       Radiology and other tests reviewed:  XR CHEST PORTABLE   Final Result   No acute process. Assessment:  Active Hospital Problems    Diagnosis Date Noted    Atherosclerosis of native coronary artery of native heart without angina pectoris [I25.10]      Priority: High    Hypotension [I95.9] 07/11/2022     Priority: Medium    Vertebral osteomyelitis, chronic (HCC) [M46.20] 07/11/2022     Priority: Medium    Symptomatic sinus bradycardia [R00.1]      Priority: Medium    ESRD (end stage renal disease) on dialysis ([facility] [address] [N18.6, Z99.2]     Mixed hyperlipidemia [E78.2]     Chronic diastolic CHF (congestive heart failure) ([facility] [address] [I50.32] 09/23/2014       Plan:  Hypotension, improved  - blood cultures x2 negative  - Procal 0.42  - CRP 1.4  - NS 75 cc/hr  - Midodrine 15 mg TID  - d/c Levophed drip, not requiring pressor support at this time  - received 25 g Albumin on 7/13  - Appreciate ICU management    Bradycardia  - On telemetry  - Holding beta-blockers  - Cardiology following with plans to assess for chronotropic incompetence    HFrEF  Echo 5/5/2022 shows EF 40-45% and mild mitral regurgitation   - Hold home Imdur  - Hold home Bumex  - Hold home Toprol  - Cardiology following    ESRD on HD  - HD schedule MWF. Patient is able to make some urine.  - Hemodialysis today    5. Hyperkalemia, improved  5.6>6.2>4.3  - Lokelma 10 g on 7/12 and 7/13  - Dialysis MWF  - Nephrology following     6.  CAD s/p stent placement  - Recent cardiac catheterization 5/2022 that revealed in-stent stenosis of mid RCA with successful balloon angioplasty, patent stent in the LAD, totally occluded LCA  - EKG changes on this admission showing new 1st degree AV block  - Continue home ASA 81 mg QD  - Continue home Ticagrelor 90 mg BID  - ICU management     7. Dysphagia, improved per patient       8. Chronic Vertebral Osteomyelitis  - Continue home vancomycin 1000 mg three times per week  - Continue home doxycycline 100 mg BID  - ICU management     9. Hyperuricemia  - Continue home Allopurinol 100 mg BID      10. Hyperlipidemia  - Continue home Lipitor 80 mg nightly     11.  Type II Diabetes Mellitus  - Continue home Lantus 5U nightly  - Hypoglycemia protocol        Pain Control:  Tylenol 650 mg Q6HR PRN for mild pain  DVT ppx: Aspirin 81 mg daily  GI ppx: Protonix 40 mg daily  Code Status: Full  Diet: General diet      Disposition: Discharge to [facility] (Non-Skilled)    Avtar Ortega MD   Family Medicine Resident PGY-1  07/15/22   7:56 AM

## 2022-07-15 NOTE — CARE COORDINATION
Continue ICU. Levophed drip stopped. HD pt. Plan is returned to [facility], bed hold and do not pre-cert but will need therapies to attempt to skill. Will need rapid covid on day of discharge. Ambulance form on chart. Will continue to follow.  Therapies ordered-o

## 2022-07-15 NOTE — PROGRESS NOTES
Critical Care Team - Daily Progress Note         Date and time: 7/15/2022 11:44 AM  Patient's name:  Wilian Graves  Medical Record Number: [id number]  Patient's account/billing number: [de-identified]  Patient's YOB: 1955  Age: 78 y.o. Date of Admission: 2022 12:11 AM  Length of stay during current admission: 4      Primary Care Physician: Angelita Leonard MD  ICU Attending Physician: Dr. Umaña Comfort    Code Status: Full Code    Reason for ICU admission: hypotension      SUBJECTIVE:     OVERNIGHT EVENTS:         7/15/2022: Off levophed, pressure with systolic > 90, HR remains in the 50's. No acute overnight events   2022: Still mildly hypotensive and bradycardiac with an inability to completely wean norepi. Mildly dizzy and nauseated overnight.       CURRENT VENTILATION STATUS:     [] Ventilator  [] BIPAP  [x] Nasal Cannula [] Room Air      IF INTUBATED, ET TUBE MARKING AT LOWER LIP:       cms    SECRETIONS Amount:  [] Small [] Moderate  [] Large  [x] None  Color:     [] White [] Colored  [] Bloody    SEDATION:  RAAS Score:  [] Propofol gtt  [] Versed gtt  [] Ativan gtt   [x] No Sedation    PARALYZED:  [x] No    [] Yes      VASOPRESSORS:  [x] No    [x] Yes    If yes -   [] Levophed       [] Dopamine     [] Vasopressin       [] Dobutamine  [] Phenylephrine         [] Epinephrine    CENTRAL LINES:     [] No   [] Yes   (Date of Insertion:   )           If yes -     [] Right IJ     [] Left IJ [x] Right Femoral [] Left Femoral                   [] Right Subclavian [] Left Subclavian       SOTOMAYOR'S CATHETER:   [x] No   [] Yes  (Date of Insertion:   )     URINE OUTPUT:            [] Good   [] Low              [x] Anuric      OBJECTIVE:     VITAL SIGNS:  BP (!) 128/58   Pulse 57   Temp 98.6 °F (37 °C) (Axillary)   Resp 16   Ht 5' 10\" (1.778 m)   Wt 144 lb 13.5 oz (65.7 kg)   SpO2 98%   BMI 20.78 kg/m²   Tmax over 24 hours:  Temp (24hrs), Av.9 °F (36.6 °C), Min:96.9 °F (36.1 °C), Max:98.6 °F (37 °C)      Patient Vitals for the past 6 hrs:   BP Temp Temp src Pulse Resp SpO2 Height   07/15/22 0939 -- -- -- -- -- -- 5' 10\" (1.778 m)   07/15/22 0600 (!) 128/58 98.6 °F (37 °C) Axillary 57 16 98 % --           Intake/Output Summary (Last 24 hours) at 7/15/2022 1144  Last data filed at 7/15/2022 0522  Gross per 24 hour   Intake 1468.91 ml   Output --   Net 1468.91 ml       Wt Readings from Last 2 Encounters:   07/13/22 144 lb 13.5 oz (65.7 kg)   06/28/22 151 lb (68.5 kg)     Body mass index is 20.78 kg/m².         PHYSICAL EXAMINATION:    General appearance - alert, no distress  Mental status - alert, oriented to person, place, and time  Eyes - pupils equal and reactive, extraocular eye movements intact  Ears - right ear normal, left ear normal  Nose - normal and patent, no erythema, discharge or polyps  Mouth - mucous membranes moist, pharynx normal without lesions  Neck - supple, no significant adenopathy  Chest - clear to auscultation, no wheezes, rales or rhonchi, symmetric air entry  Heart - bradycardiac, normal rhythm  Abdomen - soft, minimally tender, nondistended, no masses or organomegaly  Neurological - alert, oriented, normal speech, no focal findings or movement disorder noted}  Extremities - no edema, right femoral site clean with no further bleeding  Skin - no rashes     Any additional physical findings:    MEDICATIONS:    Scheduled Meds:   epoetin fani-epbx  3,000 Units SubCUTAneous Once per day on Mon Wed Fri    pantoprazole  40 mg Oral QAM AC    midodrine  15 mg Oral TID WC    latanoprost  1 drop Both Eyes Daily    brimonidine  1 drop Right Eye BID    vancomycin  1,000 mg IntraVENous Q MWF    allopurinol  100 mg Oral Daily    atorvastatin  80 mg Oral Nightly    doxycycline hyclate  100 mg Oral 2 times per day    gabapentin  200 mg Oral TID    ticagrelor  90 mg Oral BID    sodium chloride flush  5-40 mL IntraVENous 2 times per day    aspirin  81 mg Oral Daily    insulin lispro  0-6 Units SubCUTAneous TID     insulin lispro  0-3 Units SubCUTAneous Nightly    lanthanum  1,000 mg Oral TID     insulin glargine  5 Units SubCUTAneous Nightly    sodium chloride  500 mL IntraVENous Once     Continuous Infusions:   sodium chloride 500 mL/hr at 07/15/22 1103    norepinephrine Stopped (07/14/22 1700)    sodium chloride 75 mL/hr at 07/15/22 0522    sodium chloride      dextrose       PRN Meds:   sodium chloride flush, 5-40 mL, PRN  sodium chloride, , PRN  ondansetron, 4 mg, Q8H PRN   Or  ondansetron, 4 mg, Q6H PRN  polyethylene glycol, 17 g, Daily PRN  acetaminophen, 650 mg, Q6H PRN   Or  acetaminophen, 650 mg, Q6H PRN  glucose, 4 tablet, PRN  dextrose bolus, 125 mL, PRN   Or  dextrose bolus, 250 mL, PRN  glucagon (rDNA), 1 mg, PRN  dextrose, 100 mL/hr, PRN        VENT SETTINGS (Comprehensive) (if applicable): Additional Respiratory Assessments  Heart Rate: 57  Resp: 16  SpO2: 98 %    ABGs:   No results for input(s): PH, PCO2, PO2, HCO3, BE, O2SAT in the last 72 hours.     Laboratory findings:    Complete Blood Count:   Recent Labs     07/13/22  0601 07/14/22  0507 07/15/22  0520   WBC 6.6 4.2* 3.6*   HGB 11.7 8.9* 8.2*   HCT 37.1 28.5* 26.1*   * 79* 67*          Last 3 Blood Glucose:   Recent Labs     07/13/22  0601 07/14/22  0507 07/15/22  0520   GLUCOSE 101* 102* 96          PT/INR:    Lab Results   Component Value Date/Time    PROTIME 12.3 06/15/2022 07:14 PM    PROTIME 10.4 02/15/2012 10:40 AM    INR 1.1 06/15/2022 07:14 PM     PTT:    Lab Results   Component Value Date/Time    APTT 98.6 05/05/2022 06:30 AM       Comprehensive Metabolic Profile:   Recent Labs     07/13/22  0601 07/13/22  1524 07/14/22  0507 07/15/22  0520     --  137 140   K 6.2* 4.3 4.4 4.1     --  104 106   CO2 19*  --  24 21*   BUN 47*  --  24* 28*   CREATININE 4.7*  --  3.2* 3.9*   GLUCOSE 101*  --  102* 96   CALCIUM 9.4  --  8.9 8.6   PROT 5.7*  --  5.2* 4.8*   LABALBU 3.1*  --  2.9* 2.7*   BILITOT 0.3 Renal Oral Supplement)    HOME MEDICATIONS RECONCILED: [] No  [x] Yes    INSULIN DRIP:   [x] No   [] Yes    CONSULTATION NEEDED:  [x] No   [] Yes    FAMILY UPDATED:    [x] No   [] Yes    TRANSFER OUT OF ICU:   [x] No   [] Yes    ADDITIONAL PLAN:  Continue midodrine as tolerated  HD held today  Nephrology following  Trend HGB, but pt is a Confucianist   SO NO BLOOD TRANSFUSION  Will monitor BP and restart pressors if needed. Critical Care Attending Addendum:    Patient seen and examined with the house staff. Additional findings listed below as necessary. Additional comments:  Hypotension persists but is better. Has mild abdominal tenderness but soft on exam so will defer abd ct imaging for now as discussed in detail with Dr. Westley Mari. .  All cell lines are dropping raising some concern for sepsis but no new source is identified. ESRD hd held per my discussion with Dr. Westley Mari.

## 2022-07-15 NOTE — PROGRESS NOTES
Comprehensive Nutrition Assessment    Type and Reason for Visit:  RD Nutrition Re-Screen/LOS, Initial    Nutrition Recommendations/Plan:   ONS (Nepro) added to diet BID. Monitor PO intake & reassess if warrants more aggressive nutrition therapy. Malnutrition Assessment:  Malnutrition Status: Moderate malnutrition (07/15/22 1030)    Context:  Chronic Illness     Findings of the 6 clinical characteristics of malnutrition:  Energy Intake:  Mild decrease in energy intake (Comment)  Weight Loss:  5% over 1 month (per EMR Review, fluctuations 2/2 losses/HD. States # on HD.)     Body Fat Loss:  Unable to assess     Muscle Mass Loss:  Severe muscle mass loss Calf (gastrocnemius), Thigh (quadraceps)  Fluid Accumulation:  Unable to assess     Strength:  Not Performed    Nutrition Assessment:    Pt adm from SNF 2/2 hypotension. PMHx DM w/ neuropathy & retinopathy, CKD on ESRD/HD (MWF), anemia of CKD, CHF, CAD/NSTEMI/angioplasty (5/2022), Bladder/Breast CA s/p Chemo/XRT (06'/08'),  hx cardiac arrest, chronic Lt Hand/Vertebral OM, s/p Lt 3rd Digit Amp (1/20/22) and L1/L2 Bone Bx (4/18/22), noted Dysphagia s/p MBS 6/16. Pt meets criteria for moderate, chronic malnutrition d/t poor stephanie/intake & increased needs 2/2 ESRD/HD losses. Will Start ONS and monitor. Nutrition Related Findings:    A/O x 3. + 8L I/O, Abd soft, +bs. + constipation, + nausea, + muscle/fat wasting on NFPE. Wound Type: None (incision upper R chest)       Current Nutrition Intake & Therapies:    Average Meal Intake: 26-50% (observed at bedside meal)  Average Supplements Intake: None Ordered  ADULT DIET;  Regular; 4 carb choices (60 gm/meal)  ADULT ORAL NUTRITION SUPPLEMENT; Breakfast, Dinner; Renal Oral Supplement    Anthropometric Measures:  Height: 5' 10\" (177.8 cm)  Ideal Body Weight (IBW): 150 lbs (68 kg)    Admission Body Weight: 145 lb 1 oz (65.8 kg) (7/11/22 first measured Bedscale weight)  Current Body Weight: 144 lb 13.5 oz (65.7 kg) (bedscale, 7/13), 96.6 % IBW. Weight Source: Bed Scale  Current BMI (kg/m2): 20.8  Usual Body Weight: 159 lb (72.1 kg) (6/15/22 actual, bedscale. EMR Wt trend w/ large fluctuations 2/2 fluid/losses, on HD (4/13 81kg, 6/23 64.2kg, 6/15 72.1 kg))  % Weight Change (Calculated): -8.9 (HD fluid shifts/losses contributing)  Weight Adjustment For: No Adjustment                 BMI Categories: Underweight (BMI less than 22) age over 72    Estimated Daily Nutrient Needs:  Energy Requirements Based On: Formula  Weight Used for Energy Requirements: Current  Energy (kcal/day):  kcal/d  Weight Used for Protein Requirements: Ideal  Protein (g/day):  gm/d (1.3-1.5 gm IBW)  Method Used for Fluid Requirements: Standard Renal  Fluid (ml/day): Per renal mgmt    Nutrition Diagnosis:   Moderate malnutrition, In context of chronic illness related to increase demand for energy/nutrients, inadequate protein-energy intake (decreased PO PTA, increased needs on HD.) as evidenced by Criteria as identified in malnutrition assessment    Nutrition Interventions:   Food and/or Nutrient Delivery: Start Oral Nutrition Supplement (Nepro BID)  Nutrition Education/Counseling: Education initiated (Agreeable to ONS)  Coordination of Nutrition Care: Continue to monitor while inpatient       Goals:     Goals: PO intake 75% or greater, by next RD assessment       Nutrition Monitoring and Evaluation:   Behavioral-Environmental Outcomes: None Identified  Food/Nutrient Intake Outcomes: Supplement Intake  Physical Signs/Symptoms Outcomes: Biochemical Data, Chewing or Swallowing, Nausea or Vomiting, Fluid Status or Edema, Hemodynamic Status, Nutrition Focused Physical Findings, Weight, Skin    Discharge Planning:     Too soon to determine     [employer], RD  Contact: [phone]

## 2022-07-16 LAB
ALBUMIN SERPL-MCNC: 3.6 G/DL (ref 3.5–5.2)
ALP BLD-CCNC: 199 U/L (ref 35–104)
ALT SERPL-CCNC: 78 U/L (ref 0–32)
ANION GAP SERPL CALCULATED.3IONS-SCNC: 16 MMOL/L (ref 7–16)
AST SERPL-CCNC: 75 U/L (ref 0–31)
B.E.: -5 MMOL/L (ref -3–3)
BASOPHILS ABSOLUTE: 0.04 E9/L (ref 0–0.2)
BASOPHILS RELATIVE PERCENT: 0.6 % (ref 0–2)
BILIRUB SERPL-MCNC: 0.4 MG/DL (ref 0–1.2)
BLOOD CULTURE, ROUTINE: NORMAL
BUN BLDV-MCNC: 31 MG/DL (ref 6–23)
CALCIUM SERPL-MCNC: 9.5 MG/DL (ref 8.6–10.2)
CHLORIDE BLD-SCNC: 105 MMOL/L (ref 98–107)
CO2: 18 MMOL/L (ref 22–29)
CREAT SERPL-MCNC: 4.3 MG/DL (ref 0.5–1)
CULTURE, BLOOD 2: NORMAL
DEVICE: ABNORMAL
EOSINOPHILS ABSOLUTE: 0.08 E9/L (ref 0.05–0.5)
EOSINOPHILS RELATIVE PERCENT: 1.1 % (ref 0–6)
FIO2: 21
GFR AFRICAN AMERICAN: 12
GFR NON-AFRICAN AMERICAN: 12 ML/MIN/1.73
GLUCOSE BLD-MCNC: 116 MG/DL (ref 74–99)
HCO3: 21.5 MMOL/L (ref 22–26)
HCT VFR BLD CALC: 33.4 % (ref 34–48)
HEMOGLOBIN: 10.5 G/DL (ref 11.5–15.5)
IMMATURE GRANULOCYTES #: 0.12 E9/L
IMMATURE GRANULOCYTES %: 1.7 % (ref 0–5)
LYMPHOCYTES ABSOLUTE: 1.21 E9/L (ref 1.5–4)
LYMPHOCYTES RELATIVE PERCENT: 16.6 % (ref 20–42)
MAGNESIUM: 2 MG/DL (ref 1.6–2.6)
MCH RBC QN AUTO: 28.1 PG (ref 26–35)
MCHC RBC AUTO-ENTMCNC: 31.4 % (ref 32–34.5)
MCV RBC AUTO: 89.3 FL (ref 80–99.9)
METER GLUCOSE: 185 MG/DL (ref 74–99)
METER GLUCOSE: 86 MG/DL (ref 74–99)
METER GLUCOSE: 88 MG/DL (ref 74–99)
MONOCYTES ABSOLUTE: 0.62 E9/L (ref 0.1–0.95)
MONOCYTES RELATIVE PERCENT: 8.5 % (ref 2–12)
NEUTROPHILS ABSOLUTE: 5.2 E9/L (ref 1.8–7.3)
NEUTROPHILS RELATIVE PERCENT: 71.5 % (ref 43–80)
O2 SATURATION: 92.8 % (ref 92–98.5)
OPERATOR ID: 3114
PCO2 37: 45.5 MMHG (ref 35–45)
PDW BLD-RTO: 17.5 FL (ref 11.5–15)
PH 37: 7.28 (ref 7.35–7.45)
PHOSPHORUS: 4.7 MG/DL (ref 2.5–4.5)
PLATELET # BLD: 100 E9/L (ref 130–450)
PMV BLD AUTO: ABNORMAL FL (ref 7–12)
PO2 37: 74.7 MMHG (ref 60–80)
POC SOURCE: ABNORMAL
POTASSIUM SERPL-SCNC: 4.6 MMOL/L (ref 3.5–5)
RBC # BLD: 3.74 E12/L (ref 3.5–5.5)
SODIUM BLD-SCNC: 139 MMOL/L (ref 132–146)
TOTAL PROTEIN: 6.1 G/DL (ref 6.4–8.3)
WBC # BLD: 7.3 E9/L (ref 4.5–11.5)

## 2022-07-16 PROCEDURE — 2000000000 HC ICU R&B

## 2022-07-16 PROCEDURE — 82962 GLUCOSE BLOOD TEST: CPT

## 2022-07-16 PROCEDURE — 36600 WITHDRAWAL OF ARTERIAL BLOOD: CPT

## 2022-07-16 PROCEDURE — 6370000000 HC RX 637 (ALT 250 FOR IP): Performed by: INTERNAL MEDICINE

## 2022-07-16 PROCEDURE — 36415 COLL VENOUS BLD VENIPUNCTURE: CPT

## 2022-07-16 PROCEDURE — 82803 BLOOD GASES ANY COMBINATION: CPT

## 2022-07-16 PROCEDURE — 90935 HEMODIALYSIS ONE EVALUATION: CPT

## 2022-07-16 PROCEDURE — 36592 COLLECT BLOOD FROM PICC: CPT

## 2022-07-16 PROCEDURE — 83735 ASSAY OF MAGNESIUM: CPT

## 2022-07-16 PROCEDURE — 6370000000 HC RX 637 (ALT 250 FOR IP)

## 2022-07-16 PROCEDURE — 2580000003 HC RX 258: Performed by: INTERNAL MEDICINE

## 2022-07-16 PROCEDURE — 84100 ASSAY OF PHOSPHORUS: CPT

## 2022-07-16 PROCEDURE — 2580000003 HC RX 258

## 2022-07-16 PROCEDURE — 80053 COMPREHEN METABOLIC PANEL: CPT

## 2022-07-16 PROCEDURE — 6360000002 HC RX W HCPCS

## 2022-07-16 PROCEDURE — 2500000003 HC RX 250 WO HCPCS: Performed by: INTERNAL MEDICINE

## 2022-07-16 PROCEDURE — 2700000000 HC OXYGEN THERAPY PER DAY

## 2022-07-16 PROCEDURE — 99232 SBSQ HOSP IP/OBS MODERATE 35: CPT | Performed by: FAMILY MEDICINE

## 2022-07-16 PROCEDURE — 85025 COMPLETE CBC W/AUTO DIFF WBC: CPT

## 2022-07-16 RX ORDER — SORBITOL SOLUTION 70 %
30 SOLUTION, ORAL MISCELLANEOUS ONCE
Status: COMPLETED | OUTPATIENT
Start: 2022-07-16 | End: 2022-07-16

## 2022-07-16 RX ORDER — MIDODRINE HYDROCHLORIDE 5 MG/1
20 TABLET ORAL
Status: DISCONTINUED | OUTPATIENT
Start: 2022-07-16 | End: 2022-07-19

## 2022-07-16 RX ADMIN — TICAGRELOR 90 MG: 90 TABLET ORAL at 08:53

## 2022-07-16 RX ADMIN — NOREPINEPHRINE BITARTRATE 4 MCG/MIN: 1 INJECTION, SOLUTION, CONCENTRATE INTRAVENOUS at 19:17

## 2022-07-16 RX ADMIN — DOXYCYCLINE HYCLATE 100 MG: 100 CAPSULE ORAL at 08:54

## 2022-07-16 RX ADMIN — GABAPENTIN 200 MG: 100 CAPSULE ORAL at 20:42

## 2022-07-16 RX ADMIN — ACETAMINOPHEN 650 MG: 325 TABLET ORAL at 20:42

## 2022-07-16 RX ADMIN — ALLOPURINOL 100 MG: 100 TABLET ORAL at 08:52

## 2022-07-16 RX ADMIN — ATORVASTATIN CALCIUM 80 MG: 40 TABLET, FILM COATED ORAL at 20:32

## 2022-07-16 RX ADMIN — SORBITOL SOLUTION (BULK) 30 ML: 70 SOLUTION at 10:58

## 2022-07-16 RX ADMIN — SODIUM CHLORIDE: 9 INJECTION, SOLUTION INTRAVENOUS at 18:52

## 2022-07-16 RX ADMIN — GABAPENTIN 200 MG: 100 CAPSULE ORAL at 08:52

## 2022-07-16 RX ADMIN — INSULIN GLARGINE 5 UNITS: 100 INJECTION, SOLUTION SUBCUTANEOUS at 20:45

## 2022-07-16 RX ADMIN — DOXYCYCLINE HYCLATE 100 MG: 100 CAPSULE ORAL at 20:32

## 2022-07-16 RX ADMIN — MIDODRINE HYDROCHLORIDE 20 MG: 5 TABLET ORAL at 15:27

## 2022-07-16 RX ADMIN — MIDODRINE HYDROCHLORIDE 15 MG: 5 TABLET ORAL at 08:52

## 2022-07-16 RX ADMIN — ASPIRIN 81 MG: 81 TABLET, COATED ORAL at 08:53

## 2022-07-16 RX ADMIN — PANTOPRAZOLE SODIUM 40 MG: 40 TABLET, DELAYED RELEASE ORAL at 06:19

## 2022-07-16 RX ADMIN — GABAPENTIN 200 MG: 100 CAPSULE ORAL at 15:27

## 2022-07-16 RX ADMIN — SODIUM CHLORIDE, PRESERVATIVE FREE 10 ML: 5 INJECTION INTRAVENOUS at 08:55

## 2022-07-16 RX ADMIN — INSULIN LISPRO 1 UNITS: 100 INJECTION, SOLUTION INTRAVENOUS; SUBCUTANEOUS at 20:44

## 2022-07-16 RX ADMIN — VANCOMYCIN HYDROCHLORIDE 1000 MG: 1 INJECTION, POWDER, LYOPHILIZED, FOR SOLUTION INTRAVENOUS at 15:31

## 2022-07-16 RX ADMIN — TICAGRELOR 90 MG: 90 TABLET ORAL at 20:54

## 2022-07-16 ASSESSMENT — ENCOUNTER SYMPTOMS
VOMITING: 0
DIARRHEA: 0
CHOKING: 0
ANAL BLEEDING: 0
SHORTNESS OF BREATH: 0
ABDOMINAL DISTENTION: 0
CHEST TIGHTNESS: 0
BLOOD IN STOOL: 0
CONSTIPATION: 0
ABDOMINAL PAIN: 1
TROUBLE SWALLOWING: 0
NAUSEA: 0

## 2022-07-16 ASSESSMENT — PAIN SCALES - GENERAL
PAINLEVEL_OUTOF10: 0
PAINLEVEL_OUTOF10: 2
PAINLEVEL_OUTOF10: 0

## 2022-07-16 NOTE — FLOWSHEET NOTE
07/16/22 1452   Pain Assessment   Pain Level 0   Post-Hemodialysis Assessment   Post-Treatment Procedures Blood returned   Machine Disinfection Process Acid/Vinegar Clean;Heat Disinfect   Rinseback Volume (ml) 200 ml   Blood Volume Processed (Liters) 68 l/min   Dialyzer Clearance Clear   Duration of Treatment (minutes) 200 minutes   Hemodialysis Intake (ml) 900 ml   Hemodialysis Output (ml) 900 ml   NET Removed (ml) 0   Tolerated Treatment Fair   Interventions Taken Fluid bolus   Patient Response to Treatment Tolerated fairly well.  Levo increased   Bilateral Breath Sounds Diminished   Edema Generalized   Time Off 1450

## 2022-07-16 NOTE — PROGRESS NOTES
18  SpO2: 100 %      General: Lethargic arouses  Neck: No JVD noted  Lungs: Diminished at the bases bilaterally. CV: Regular rate and rhythm but bradycardic. No rub  Abd: Soft, tender diffusely  nondistended. Active bowel sounds  Skin: Warm and dry. No rash on exposed extremities  Ext: 1+ RUE edema (h/o breast CA);  No BLE edema ; LUE AV fistula   Neuro: Lethargic arouses and  answers questions appropriately    DATA:    CBC with Differential:    Lab Results   Component Value Date/Time    WBC 7.3 07/16/2022 05:28 AM    RBC 3.74 07/16/2022 05:28 AM    HGB 10.5 07/16/2022 05:28 AM    HCT 33.4 07/16/2022 05:28 AM     07/16/2022 05:28 AM    MCV 89.3 07/16/2022 05:28 AM    MCH 28.1 07/16/2022 05:28 AM    MCHC 31.4 07/16/2022 05:28 AM    RDW 17.5 07/16/2022 05:28 AM    NRBC 1.8 07/15/2022 05:20 AM    SEGSPCT 70 03/12/2014 10:53 AM    BANDSPCT 1 02/18/2015 10:35 AM    LYMPHOPCT 16.6 07/16/2022 05:28 AM    PROMYELOPCT 1.8 02/18/2017 04:00 AM    MONOPCT 8.5 07/16/2022 05:28 AM    MYELOPCT 0.9 10/24/2017 10:45 AM    BASOPCT 0.6 07/16/2022 05:28 AM    MONOSABS 0.62 07/16/2022 05:28 AM    LYMPHSABS 1.21 07/16/2022 05:28 AM    EOSABS 0.08 07/16/2022 05:28 AM    BASOSABS 0.04 07/16/2022 05:28 AM     CMP:    Lab Results   Component Value Date/Time     07/16/2022 05:28 AM    K 4.6 07/16/2022 05:28 AM    K 4.7 07/11/2022 12:46 AM     07/16/2022 05:28 AM    CO2 18 07/16/2022 05:28 AM    BUN 31 07/16/2022 05:28 AM    CREATININE 4.3 07/16/2022 05:28 AM    GFRAA 12 07/16/2022 05:28 AM    LABGLOM 12 07/16/2022 05:28 AM    GLUCOSE 116 07/16/2022 05:28 AM    GLUCOSE 46 04/02/2012 11:00 AM    PROT 6.1 07/16/2022 05:28 AM    LABALBU 3.6 07/16/2022 05:28 AM    LABALBU 3.8 04/02/2012 11:00 AM    CALCIUM 9.5 07/16/2022 05:28 AM    BILITOT 0.4 07/16/2022 05:28 AM    ALKPHOS 199 07/16/2022 05:28 AM    AST 75 07/16/2022 05:28 AM    ALT 78 07/16/2022 05:28 AM            white petrolatum   Topical BID    epoetin fani-epbx  3,000 Units SubCUTAneous Once per day on Mon Wed Fri    pantoprazole  40 mg Oral QAM AC    midodrine  15 mg Oral TID     latanoprost  1 drop Both Eyes Daily    brimonidine  1 drop Right Eye BID    vancomycin  1,000 mg IntraVENous Q MWF    allopurinol  100 mg Oral Daily    atorvastatin  80 mg Oral Nightly    doxycycline hyclate  100 mg Oral 2 times per day    gabapentin  200 mg Oral TID    ticagrelor  90 mg Oral BID    sodium chloride flush  5-40 mL IntraVENous 2 times per day    aspirin  81 mg Oral Daily    insulin lispro  0-6 Units SubCUTAneous TID     insulin lispro  0-3 Units SubCUTAneous Nightly    lanthanum  1,000 mg Oral TID     insulin glargine  5 Units SubCUTAneous Nightly    sodium chloride  500 mL IntraVENous Once      norepinephrine 6 mcg/min (07/16/22 0754)    sodium chloride 75 mL/hr at 07/16/22 [facility]    sodium chloride      dextrose       sodium chloride flush, sodium chloride, ondansetron **OR** ondansetron, polyethylene glycol, acetaminophen **OR** acetaminophen, glucose, dextrose bolus **OR** dextrose bolus, glucagon (rDNA), dextrose    IMPRESSION/RECOMMENDATIONS:      1. End-stage renal disease with Intradialytic Hypotension  PLAN:  1. IHD on  Monday Wednesday Friday -treatment held 7/15 due to hypotension-plan for treatment today with minimal vol removal  2. Follow labs  3. Will use cooled dialysate with next treatment    2. Hyperkalemia  K+ WNL 4.6 pre dialysis  PLAN:  1. Follow K+    3. Hypotension with   Sinus bradycardia   metoprolol and timolol eye drops discontinued-remains bradycardic-concern for autonomic dysfunction-currently on pressor-Norepi  PLAN:  1. Continue to monitor-wean pressor as tolerated-Cardiology signed off no further intervention planned    4. Anemia of chronic kidney disease with Thrombocytopenia and Leukopenia  HgB 11.7-->8.9 -->8.2 -->10.5 over 24hrs with decrease in -->79-->67 -->100  PLAN:  1. Continue JAYSHREE   2. Follow H/H    5.  Secondary hyperparathyroidism of renal origin with Hyperphosphatemia  PO4 now WNL  PLAN:  1. Follow Ca++ and PO4  2.  Hold PO4 binder    Case discussed with Pulm/CCC    Greer Potter MD  7/16/2022 9:03 AM

## 2022-07-16 NOTE — PROGRESS NOTES
[address] [address]  Progress Note    Chief complaint :  Chief Complaint   Patient presents with    Hypotension     given 2 percocet and 3 doses of midodrine     Subjective:    Pt is very lethargic today, however would respond to questions slowly. She still complains about her chronic abdominal and back pain. Patient denies chest pain, SOB, nausea, vomiting, bloody stools, fever, chills, changes in urination. Pt is on Levophed support. Past medical, surgical, family and social history were reviewed, non-contributory, and unchanged unless otherwise stated. Review of Systems   Constitutional:  Negative for fatigue and fever. HENT:  Negative for nosebleeds and trouble swallowing. Eyes:  Negative for visual disturbance. Respiratory:  Negative for choking, chest tightness and shortness of breath. Cardiovascular:  Negative for chest pain and leg swelling. Gastrointestinal:  Positive for abdominal pain. Negative for abdominal distention, anal bleeding, blood in stool, constipation, diarrhea, nausea and vomiting. Genitourinary:  Negative for difficulty urinating. Neurological:  Negative for light-headedness and headaches. Objective:  BP (!) 109/48   Pulse 70   Temp 98.2 °F (36.8 °C)   Resp (!) 107   Ht 5' 10\" (1.778 m)   Wt 144 lb 13.5 oz (65.7 kg)   SpO2 97%   BMI 20.78 kg/m²     Physical Exam  Constitutional:       Appearance: Normal appearance. Comments: Lethargic   HENT:      Head: Normocephalic and atraumatic. Right Ear: External ear normal.      Left Ear: External ear normal.   Eyes:      Extraocular Movements: Extraocular movements intact. Conjunctiva/sclera: Conjunctivae normal.   Cardiovascular:      Rate and Rhythm: Regular rhythm. Bradycardia present. Heart sounds: No murmur heard. No friction rub. No gallop. Pulmonary:      Effort: Pulmonary effort is normal.      Breath sounds: Normal breath sounds.    Abdominal: General: Abdomen is flat. There is no distension. Palpations: Abdomen is soft. There is no mass. Tenderness: There is abdominal tenderness (chronic, stable). Hernia: No hernia is present. Musculoskeletal:         General: No swelling or deformity. Normal range of motion. Cervical back: Normal range of motion. Right lower leg: Edema present. Left lower leg: Edema present. Comments: Edema in arms. Skin:     General: Skin is warm and dry. Neurological:      General: No focal deficit present. Psychiatric:         Mood and Affect: Mood normal.         Thought Content:  Thought content normal.       Labs:  Recent Results (from the past 24 hour(s))   POCT Glucose    Collection Time: 07/15/22 12:18 PM   Result Value Ref Range    Meter Glucose 90 74 - 99 mg/dL   POCT Glucose    Collection Time: 07/15/22  6:26 PM   Result Value Ref Range    Meter Glucose 111 (H) 74 - 99 mg/dL   POCT Glucose    Collection Time: 07/15/22  9:21 PM   Result Value Ref Range    Meter Glucose 98 74 - 99 mg/dL   CBC with Auto Differential    Collection Time: 07/16/22  5:28 AM   Result Value Ref Range    WBC 7.3 4.5 - 11.5 E9/L    RBC 3.74 3.50 - 5.50 E12/L    Hemoglobin 10.5 (L) 11.5 - 15.5 g/dL    Hematocrit 33.4 (L) 34.0 - 48.0 %    MCV 89.3 80.0 - 99.9 fL    MCH 28.1 26.0 - 35.0 pg    MCHC 31.4 (L) 32.0 - 34.5 %    RDW 17.5 (H) 11.5 - 15.0 fL    Platelets 721 (L) 828 - 450 E9/L    MPV NOT CALC 7.0 - 12.0 fL    Neutrophils % 71.5 43.0 - 80.0 %    Immature Granulocytes % 1.7 0.0 - 5.0 %    Lymphocytes % 16.6 (L) 20.0 - 42.0 %    Monocytes % 8.5 2.0 - 12.0 %    Eosinophils % 1.1 0.0 - 6.0 %    Basophils % 0.6 0.0 - 2.0 %    Neutrophils Absolute 5.20 1.80 - 7.30 E9/L    Immature Granulocytes # 0.12 E9/L    Lymphocytes Absolute 1.21 (L) 1.50 - 4.00 E9/L    Monocytes Absolute 0.62 0.10 - 0.95 E9/L    Eosinophils Absolute 0.08 0.05 - 0.50 E9/L    Basophils Absolute 0.04 0.00 - 0.20 E9/L   Phosphorus    Collection Time: 07/16/22  5:28 AM   Result Value Ref Range    Phosphorus 4.7 (H) 2.5 - 4.5 mg/dL   Magnesium    Collection Time: 07/16/22  5:28 AM   Result Value Ref Range    Magnesium 2.0 1.6 - 2.6 mg/dL   Comprehensive Metabolic Panel    Collection Time: 07/16/22  5:28 AM   Result Value Ref Range    Sodium 139 132 - 146 mmol/L    Potassium 4.6 3.5 - 5.0 mmol/L    Chloride 105 98 - 107 mmol/L    CO2 18 (L) 22 - 29 mmol/L    Anion Gap 16 7 - 16 mmol/L    Glucose 116 (H) 74 - 99 mg/dL    BUN 31 (H) 6 - 23 mg/dL    CREATININE 4.3 (H) 0.5 - 1.0 mg/dL    GFR Non-African American 12 >=60 mL/min/1.73    GFR African American 12     Calcium 9.5 8.6 - 10.2 mg/dL    Total Protein 6.1 (L) 6.4 - 8.3 g/dL    Albumin 3.6 3.5 - 5.2 g/dL    Total Bilirubin 0.4 0.0 - 1.2 mg/dL    Alkaline Phosphatase 199 (H) 35 - 104 U/L    ALT 78 (H) 0 - 32 U/L    AST 75 (H) 0 - 31 U/L       Radiology and other tests reviewed:  XR CHEST PORTABLE   Final Result   No acute process.              Assessment:  Active Hospital Problems    Diagnosis Date Noted    Atherosclerosis of native coronary artery of native heart without angina pectoris [I25.10]      Priority: High    Hypotension [I95.9] 07/11/2022     Priority: Medium    Vertebral osteomyelitis, chronic (HCC) [M46.20] 07/11/2022     Priority: Medium    Symptomatic sinus bradycardia [R00.1]      Priority: Medium    ESRD (end stage renal disease) on dialysis ([facility] [address] [N18.6, Z99.2]     Mixed hyperlipidemia [E78.2]     [facility] [I50.32] 09/23/2014       Plan:  Hypotension, improving  blood cultures x2 negative  Procal 0.42  CRP 1.4  NS 75 cc/hr  Midodrine 15 mg TID  Remains on  Levophed drip  received 25 g Albumin on 7/13  Dialysis was held yesterday  Appreciate ICU management    Bradycardia  On telemetry  Holding beta-blockers  Cardiology following with plans to assess for chronotropic incompetence    HFrEF  Echo 5/5/2022 shows EF 40-45% and mild mitral regurgitation

## 2022-07-16 NOTE — PATIENT CARE CONFERENCE
Intensive Care Daily Quality Rounding Checklist        ICU Team Members: charge nurse/bedside nurse/paras     ICU Day #: 6     Intubation Date: N/A     Ventilator Day #: N/A     Central Line Insertion Date: July 11                                                    Day #: NUMBER: 6      Arterial Line Insertion Date: N/A                             Day #: N/A     Temporary Hemodialysis Catheter Insertion Date: Tessio LUE                             Day #: N/A     DVT Prophylaxis: Heparin SQ    GI Prophylaxis: Protonix     Gant Catheter Insertion Date: n/a                                        Day #: n/a                             Continued need (if yes, reason documented and discussed with physician): n/a     Skin Issues/ Wounds and ordered treatment discussed on rounds: Previous Mediport site dressing  Finger tip amputation healed     Goals/ Plans for the Day:letharic, hd, increase midodrine to 20 mg

## 2022-07-16 NOTE — PROGRESS NOTES
Critical Care Team - Daily Progress Note         Date and time: 7/16/2022 1:08 PM  Patient's name:  Annie Ruth  Medical Record Number: [id number]  Patient's account/billing number: [de-identified]  Patient's YOB: 1955  Age: 78 y.o. Date of Admission: 7/11/2022 12:11 AM  Length of stay during current admission: 5      Primary Care Physician: Joseph Valenzuela MD  ICU Attending Physician: Dr. Yesy Finnegan    Code Status: Full Code    Reason for ICU admission: hypotension      SUBJECTIVE:     OVERNIGHT EVENTS:      7/16: Multiple complaints, nausea, headache and dyspnea. Hypothermic overnight. Norepi at 6.     7/15/2022: Off levophed, pressure with systolic > 90, HR remains in the 50's. No acute overnight events   7/14/2022: Still mildly hypotensive and bradycardiac with an inability to completely wean norepi. Mildly dizzy and nauseated overnight.       CURRENT VENTILATION STATUS:     [] Ventilator  [] BIPAP  [x] Nasal Cannula [] Room Air      IF INTUBATED, ET TUBE MARKING AT LOWER LIP:       cms    SECRETIONS Amount:  [] Small [] Moderate  [] Large  [x] None  Color:     [] White [] Colored  [] Bloody    SEDATION:  RAAS Score:  [] Propofol gtt  [] Versed gtt  [] Ativan gtt   [x] No Sedation    PARALYZED:  [x] No    [] Yes      VASOPRESSORS:  [] No    [x] Yes    If yes -   [x] Levophed       [] Dopamine     [] Vasopressin       [] Dobutamine  [] Phenylephrine         [] Epinephrine    CENTRAL LINES:     [] No   [] Yes   (Date of Insertion: 7/12  )           If yes -     [] Right IJ     [] Left IJ [x] Right Femoral [] Left Femoral                   [] Right Subclavian [] Left Subclavian       SOTOMAYOR'S CATHETER:   [x] No   [] Yes  (Date of Insertion:   )     URINE OUTPUT:            [] Good   [] Low              [x] Anuric      OBJECTIVE:     VITAL SIGNS:  BP (!) 109/57   Pulse 66   Temp 96.8 °F (36 °C) (Axillary)   Resp 18   Ht 5' 10\" (1.778 m)   Wt 144 lb 13.5 oz (65.7 kg)   SpO2 99%   BMI 20.78 kg/m²   Tmax over 24 hours:  Temp (24hrs), Av.1 °F (35.6 °C), Min:92.5 °F (33.6 °C), Max:98.2 °F (36.8 °C)      Patient Vitals for the past 6 hrs:   BP Temp Temp src Pulse Resp SpO2   22 1200 (!) 109/57 96.8 °F (36 °C) Axillary 66 18 99 %   22 1100 (!) 86/41 -- -- 62 13 94 %   22 1000 (!) 85/40 -- -- 64 27 97 %   22 0900 (!) 88/50 -- -- 66 19 100 %   22 0800 133/60 (!) 96.6 °F (35.9 °C) Axillary 67 18 100 %         Intake/Output Summary (Last 24 hours) at 2022 1308  Last data filed at 2022 0754  Gross per 24 hour   Intake 2637.47 ml   Output --   Net 2637.47 ml     Wt Readings from Last 2 Encounters:   22 144 lb 13.5 oz (65.7 kg)   22 151 lb (68.5 kg)     Body mass index is 20.78 kg/m².         PHYSICAL EXAMINATION:    General appearance - lethargic no distress  Mental status - alert, oriented to person, place, and time  Eyes - pupils equal and reactive, extraocular eye movements intact  Ears - right ear normal, left ear normal  Nose - normal and patent, no erythema, discharge or polyps  Mouth - mucous membranes moist, pharynx normal without lesions  Neck - supple, no significant adenopathy  Chest - clear to auscultation, no wheezes, rales or rhonchi, symmetric air entry  Heart - bradycardiac, normal rhythm  Abdomen - soft, minimally tender, nondistended, no masses or organomegaly  Neurological - lethargic, oriented, normal speech, no focal findings or movement disorder noted}  Extremities - no edema, right femoral site clean with no further bleeding  Skin - no rashes     Any additional physical findings:    MEDICATIONS:    Scheduled Meds:   midodrine  20 mg Oral TID WC    white petrolatum   Topical BID    epoetin fani-epbx  3,000 Units SubCUTAneous Once per day on Mon Wed Fri    pantoprazole  40 mg Oral QAM AC    latanoprost  1 drop Both Eyes Daily    brimonidine  1 drop Right Eye BID    vancomycin  1,000 mg IntraVENous Q MWF    allopurinol  100 mg Oral BUN 24* 28* 31*   CREATININE 3.2* 3.9* 4.3*   GLUCOSE 102* 96 116*   CALCIUM 8.9 8.6 9.5   PROT 5.2* 4.8* 6.1*   LABALBU 2.9* 2.7* 3.6   BILITOT 0.4 0.3 0.4   ALKPHOS 153* 146* 199*   AST 51* 47* 75*   ALT 59* 55* 78*      Magnesium:   Lab Results   Component Value Date/Time    MG 2.0 07/16/2022 05:28 AM     Phosphorus:   Lab Results   Component Value Date/Time    PHOS 4.7 07/16/2022 05:28 AM     Ionized Calcium:   Lab Results   Component Value Date/Time    CAION 1.22 11/08/2017 04:28 AM        Urinalysis:     Troponin: No results for input(s): TROPONINI in the last 72 hours. Microbiology:    Cultures during this admission:     Blood cultures:                 [] None drawn      [x] Negative             []  Positive (Details:  )  Urine Culture:                   [x] None drawn      [] Negative             []  Positive (Details:  )  Sputum Culture:               [x] None drawn       [] Negative             []  Positive (Details:  )   Endotracheal aspirate:     [x] None drawn       [] Negative             []  Positive (Details:  )     Other pertinent Labs:       Radiology/Imaging:              ASSESSMENT:     Principal Problem:    Hypotension  Active Problems:    Atherosclerosis of native coronary artery of native heart without angina pectoris    Vertebral osteomyelitis, chronic (HCC)    Symptomatic sinus bradycardia    Chronic diastolic CHF (congestive heart failure) ([facility])    ESRD (end stage renal disease) on dialysis ([facility] [address]    Mixed hyperlipidemia  Resolved Problems:    * No resolved hospital problems. *      Additional assessment:    Mildly acidemic mixed met and resp but mild so see response to dialysis. Persistent abdominal pain ct abd ordered as d/w  Staff        PLAN:     WEAN PER PROTOCOL:  [] No   [] Yes  [x] N/A    DISCONTINUE ANY LABS:   [x] No   [] Yes    ICU PROPHYLAXIS:  Stress ulcer:  [x] PPI Agent  [] [id number] [] Sucralfate  [] Other:  VTE:   [] Enoxaparin  [] Unfract.  Heparin Subcut  [x] EPC Cuffs    NUTRITION:  [] NPO [] Tube Feeding (Specify: ) [] TPN  [x] PO (Diet: ADULT DIET; Regular; 4 carb choices (60 gm/meal)  ADULT ORAL NUTRITION SUPPLEMENT; Breakfast, Dinner; Renal Oral Supplement)    HOME MEDICATIONS RECONCILED: [] No  [x] Yes    INSULIN DRIP:   [x] No   [] Yes    CONSULTATION NEEDED:  [x] No   [] Yes    FAMILY UPDATED:    [x] No   [] Yes    TRANSFER OUT OF ICU:   [x] No   [] Yes    ADDITIONAL PLAN:  Increase midodrine  HD done today  Nephrology following  Trended HGB is back to baseline, pt is a [employer]   SO NO BLOOD TRANSFUSION ANYWAY  Will monitor BP and wean pressors. For ct abdomen.     30 min CCT

## 2022-07-16 NOTE — PLAN OF CARE
Problem: Discharge Planning  Goal: Discharge to home or other facility with appropriate resources  Outcome: Not Progressing  Flowsheets (Taken 7/15/2022 0800)  Discharge to home or other facility with appropriate resources: Identify barriers to discharge with patient and caregiver     Problem: Chronic Conditions and Co-morbidities  Goal: Patient's chronic conditions and co-morbidity symptoms are monitored and maintained or improved  Outcome: Not Progressing  Flowsheets (Taken 7/15/2022 0800)  Care Plan - Patient's Chronic Conditions and Co-Morbidity Symptoms are Monitored and Maintained or Improved:   Monitor and assess patient's chronic conditions and comorbid symptoms for stability, deterioration, or improvement   Collaborate with multidisciplinary team to address chronic and comorbid conditions and prevent exacerbation or deterioration     Problem: Nutrition Deficit:  Goal: Optimize nutritional status  Outcome: Not Progressing     Problem: ABCDS Injury Assessment  Goal: Absence of physical injury  Outcome: Progressing  Flowsheets (Taken 7/15/2022 0800)  Absence of Physical Injury: Implement safety measures based on patient assessment     Problem: Skin/Tissue Integrity  Goal: Absence of new skin breakdown  Description: 1. Monitor for areas of redness and/or skin breakdown  2. Assess vascular access sites hourly  3. Every 4-6 hours minimum:  Change oxygen saturation probe site  4. Every 4-6 hours:  If on nasal continuous positive airway pressure, respiratory therapy assess nares and determine need for appliance change or resting period.   Outcome: Progressing     Problem: Safety - Adult  Goal: Free from fall injury  Outcome: Progressing  Flowsheets (Taken 7/15/2022 0800)  Free From Fall Injury: Instruct family/caregiver on patient safety     Problem: Pain  Goal: Verbalizes/displays adequate comfort level or baseline comfort level  Outcome: Progressing

## 2022-07-17 ENCOUNTER — APPOINTMENT (OUTPATIENT)
Dept: CT IMAGING | Age: 67
DRG: 314 | End: 2022-07-17
Payer: COMMERCIAL

## 2022-07-17 LAB
ALBUMIN SERPL-MCNC: 2.7 G/DL (ref 3.5–5.2)
ALP BLD-CCNC: 166 U/L (ref 35–104)
ALT SERPL-CCNC: 59 U/L (ref 0–32)
ANION GAP SERPL CALCULATED.3IONS-SCNC: 10 MMOL/L (ref 7–16)
AST SERPL-CCNC: 56 U/L (ref 0–31)
BASOPHILS ABSOLUTE: 0.01 E9/L (ref 0–0.2)
BASOPHILS RELATIVE PERCENT: 0.3 % (ref 0–2)
BILIRUB SERPL-MCNC: 0.4 MG/DL (ref 0–1.2)
BUN BLDV-MCNC: 19 MG/DL (ref 6–23)
CALCIUM SERPL-MCNC: 9 MG/DL (ref 8.6–10.2)
CHLORIDE BLD-SCNC: 106 MMOL/L (ref 98–107)
CO2: 24 MMOL/L (ref 22–29)
CREAT SERPL-MCNC: 3 MG/DL (ref 0.5–1)
EOSINOPHILS ABSOLUTE: 0.08 E9/L (ref 0.05–0.5)
EOSINOPHILS RELATIVE PERCENT: 2.1 % (ref 0–6)
GFR AFRICAN AMERICAN: 19
GFR NON-AFRICAN AMERICAN: 19 ML/MIN/1.73
GLUCOSE BLD-MCNC: 46 MG/DL (ref 74–99)
HCT VFR BLD CALC: 29.3 % (ref 34–48)
HEMOGLOBIN: 9 G/DL (ref 11.5–15.5)
IMMATURE GRANULOCYTES #: 0.02 E9/L
IMMATURE GRANULOCYTES %: 0.5 % (ref 0–5)
LYMPHOCYTES ABSOLUTE: 0.94 E9/L (ref 1.5–4)
LYMPHOCYTES RELATIVE PERCENT: 25 % (ref 20–42)
MAGNESIUM: 1.8 MG/DL (ref 1.6–2.6)
MCH RBC QN AUTO: 27.8 PG (ref 26–35)
MCHC RBC AUTO-ENTMCNC: 30.7 % (ref 32–34.5)
MCV RBC AUTO: 90.4 FL (ref 80–99.9)
METER GLUCOSE: 47 MG/DL (ref 74–99)
METER GLUCOSE: 48 MG/DL (ref 74–99)
METER GLUCOSE: 60 MG/DL (ref 74–99)
METER GLUCOSE: 67 MG/DL (ref 74–99)
METER GLUCOSE: 69 MG/DL (ref 74–99)
METER GLUCOSE: 72 MG/DL (ref 74–99)
METER GLUCOSE: 84 MG/DL (ref 74–99)
METER GLUCOSE: 84 MG/DL (ref 74–99)
MONOCYTES ABSOLUTE: 0.43 E9/L (ref 0.1–0.95)
MONOCYTES RELATIVE PERCENT: 11.4 % (ref 2–12)
NEUTROPHILS ABSOLUTE: 2.28 E9/L (ref 1.8–7.3)
NEUTROPHILS RELATIVE PERCENT: 60.7 % (ref 43–80)
PDW BLD-RTO: 17.3 FL (ref 11.5–15)
PHOSPHORUS: 3.3 MG/DL (ref 2.5–4.5)
PLATELET # BLD: 59 E9/L (ref 130–450)
PLATELET CONFIRMATION: NORMAL
PMV BLD AUTO: ABNORMAL FL (ref 7–12)
POTASSIUM SERPL-SCNC: 4 MMOL/L (ref 3.5–5)
RBC # BLD: 3.24 E12/L (ref 3.5–5.5)
SODIUM BLD-SCNC: 140 MMOL/L (ref 132–146)
TOTAL PROTEIN: 5.1 G/DL (ref 6.4–8.3)
WBC # BLD: 3.8 E9/L (ref 4.5–11.5)

## 2022-07-17 PROCEDURE — 85025 COMPLETE CBC W/AUTO DIFF WBC: CPT

## 2022-07-17 PROCEDURE — 6370000000 HC RX 637 (ALT 250 FOR IP)

## 2022-07-17 PROCEDURE — 36415 COLL VENOUS BLD VENIPUNCTURE: CPT

## 2022-07-17 PROCEDURE — 2580000003 HC RX 258

## 2022-07-17 PROCEDURE — 2000000000 HC ICU R&B

## 2022-07-17 PROCEDURE — 82962 GLUCOSE BLOOD TEST: CPT

## 2022-07-17 PROCEDURE — 6370000000 HC RX 637 (ALT 250 FOR IP): Performed by: INTERNAL MEDICINE

## 2022-07-17 PROCEDURE — 2700000000 HC OXYGEN THERAPY PER DAY

## 2022-07-17 PROCEDURE — 99231 SBSQ HOSP IP/OBS SF/LOW 25: CPT | Performed by: FAMILY MEDICINE

## 2022-07-17 PROCEDURE — 36592 COLLECT BLOOD FROM PICC: CPT

## 2022-07-17 PROCEDURE — 84100 ASSAY OF PHOSPHORUS: CPT

## 2022-07-17 PROCEDURE — 80053 COMPREHEN METABOLIC PANEL: CPT

## 2022-07-17 PROCEDURE — 6360000004 HC RX CONTRAST MEDICATION: Performed by: RADIOLOGY

## 2022-07-17 PROCEDURE — 83735 ASSAY OF MAGNESIUM: CPT

## 2022-07-17 PROCEDURE — 74178 CT ABD&PLV WO CNTR FLWD CNTR: CPT

## 2022-07-17 RX ADMIN — SODIUM CHLORIDE, PRESERVATIVE FREE 10 ML: 5 INJECTION INTRAVENOUS at 08:59

## 2022-07-17 RX ADMIN — GABAPENTIN 200 MG: 100 CAPSULE ORAL at 08:58

## 2022-07-17 RX ADMIN — IOPAMIDOL 50 ML: 755 INJECTION, SOLUTION INTRAVENOUS at 20:42

## 2022-07-17 RX ADMIN — SODIUM CHLORIDE: 9 INJECTION, SOLUTION INTRAVENOUS at 22:50

## 2022-07-17 RX ADMIN — MIDODRINE HYDROCHLORIDE 20 MG: 5 TABLET ORAL at 12:27

## 2022-07-17 RX ADMIN — DOXYCYCLINE HYCLATE 100 MG: 100 CAPSULE ORAL at 08:58

## 2022-07-17 RX ADMIN — ATORVASTATIN CALCIUM 80 MG: 40 TABLET, FILM COATED ORAL at 22:07

## 2022-07-17 RX ADMIN — TICAGRELOR 90 MG: 90 TABLET ORAL at 08:58

## 2022-07-17 RX ADMIN — LANTHANUM CARBONATE 1000 MG: 500 TABLET, CHEWABLE ORAL at 08:57

## 2022-07-17 RX ADMIN — PANTOPRAZOLE SODIUM 40 MG: 40 TABLET, DELAYED RELEASE ORAL at 05:08

## 2022-07-17 RX ADMIN — GABAPENTIN 200 MG: 100 CAPSULE ORAL at 22:08

## 2022-07-17 RX ADMIN — Medication 16 G: at 06:26

## 2022-07-17 RX ADMIN — BRIMONIDINE TARTRATE 1 DROP: 2 SOLUTION OPHTHALMIC at 08:59

## 2022-07-17 RX ADMIN — SODIUM CHLORIDE, PRESERVATIVE FREE 10 ML: 5 INJECTION INTRAVENOUS at 22:09

## 2022-07-17 RX ADMIN — MIDODRINE HYDROCHLORIDE 20 MG: 5 TABLET ORAL at 17:46

## 2022-07-17 RX ADMIN — ALLOPURINOL 100 MG: 100 TABLET ORAL at 08:58

## 2022-07-17 RX ADMIN — BRIMONIDINE TARTRATE 1 DROP: 2 SOLUTION OPHTHALMIC at 22:50

## 2022-07-17 RX ADMIN — SODIUM CHLORIDE: 9 INJECTION, SOLUTION INTRAVENOUS at 08:30

## 2022-07-17 RX ADMIN — LATANOPROST 1 DROP: 50 SOLUTION OPHTHALMIC at 22:51

## 2022-07-17 RX ADMIN — ASPIRIN 81 MG: 81 TABLET, COATED ORAL at 08:58

## 2022-07-17 RX ADMIN — GABAPENTIN 200 MG: 100 CAPSULE ORAL at 14:42

## 2022-07-17 RX ADMIN — IOHEXOL 50 ML: 240 INJECTION, SOLUTION INTRATHECAL; INTRAVASCULAR; INTRAVENOUS; ORAL at 20:43

## 2022-07-17 RX ADMIN — LANTHANUM CARBONATE 1000 MG: 500 TABLET, CHEWABLE ORAL at 12:27

## 2022-07-17 RX ADMIN — MIDODRINE HYDROCHLORIDE 20 MG: 5 TABLET ORAL at 08:57

## 2022-07-17 RX ADMIN — DOXYCYCLINE HYCLATE 100 MG: 100 CAPSULE ORAL at 22:07

## 2022-07-17 RX ADMIN — TICAGRELOR 90 MG: 90 TABLET ORAL at 22:08

## 2022-07-17 ASSESSMENT — ENCOUNTER SYMPTOMS
CONSTIPATION: 0
ABDOMINAL PAIN: 1
SHORTNESS OF BREATH: 0
VOMITING: 0
BLOOD IN STOOL: 0
ANAL BLEEDING: 0
CHOKING: 0
TROUBLE SWALLOWING: 0
NAUSEA: 0
CHEST TIGHTNESS: 0
DIARRHEA: 0
ABDOMINAL DISTENTION: 0

## 2022-07-17 ASSESSMENT — PAIN SCALES - GENERAL
PAINLEVEL_OUTOF10: 0

## 2022-07-17 ASSESSMENT — PAIN SCALES - WONG BAKER: WONGBAKER_NUMERICALRESPONSE: 0

## 2022-07-17 NOTE — PROGRESS NOTES
BS on AM labs - 46 - patient awake & alert - jello and apple juice given. Repeat finger stick 47. Patient eating cherries. Glucose tab administered. Repeat BS 69.

## 2022-07-17 NOTE — PATIENT CARE CONFERENCE
Intensive Care Daily Quality Rounding Checklist        ICU Team Members: Dr. Mason Campa, Dr. Vince Shaw (resident), charge nurse, bedside nurse     ICU Day #: 7     Intubation Date: N/A     Ventilator Day #: N/A     Central Line Insertion Date: July 11                                                    Day #: NUMBER: 7      Arterial Line Insertion Date: N/A                             Day #: N/A     Temporary Hemodialysis Catheter Insertion Date: Tessio LUE                             Day #: N/A     DVT Prophylaxis: Heparin SQ    GI Prophylaxis: Protonix     Gant Catheter Insertion Date: n/a                                        Day #: n/a                             Continued need (if yes, reason documented and discussed with physician): n/a     Skin Issues/ Wounds and ordered treatment discussed on rounds: Previous Mediport site dressing  Finger tip amputation healed     Goals/ Plans for the Day: Daily labs, HD per Nephrology, CT abdomen to be timed with HD, wean Levophed as able

## 2022-07-17 NOTE — PLAN OF CARE
Problem: ABCDS Injury Assessment  Goal: Absence of physical injury  Recent Flowsheet Documentation  Taken 7/17/2022 0010 by Jennifer Khan RN  Absence of Physical Injury: Implement safety measures based on patient assessment

## 2022-07-17 NOTE — PROGRESS NOTES
[facility]    Chief complaint :  Chief Complaint   Patient presents with    Hypotension     given 2 percocet and 3 doses of midodrine     Subjective:    Patient sitting and having breakfast this morning. Patient states that she is doing well but still has some diffuse abdominal pain. Patient describes having back pain causing it to radiate to her abdomen. Patient denies any chest pain, shortness of breath, nausea, vomiting, fever, chills. Patient currently in the ICU on norepinephrine 2    As per ICU nurse patient is doing well overall. Patient had low glucose levels running in the 40s but was given some glucose gel and Jell-O and a glucose levels improved and now currently eating breakfast.    Past medical, surgical, family and social history were reviewed, non-contributory, and unchanged unless otherwise stated. Review of Systems   Constitutional:  Negative for fatigue and fever. HENT:  Negative for nosebleeds and trouble swallowing. Eyes:  Negative for visual disturbance. Respiratory:  Negative for choking, chest tightness and shortness of breath. Cardiovascular:  Negative for chest pain and leg swelling. Gastrointestinal:  Positive for abdominal pain. Negative for abdominal distention, anal bleeding, blood in stool, constipation, diarrhea, nausea and vomiting. Genitourinary:  Negative for difficulty urinating. Neurological:  Negative for light-headedness and headaches. Objective:  BP (!) 95/45   Pulse 59   Temp 97 °F (36.1 °C) (Axillary)   Resp 23   Ht 5' 10\" (1.778 m)   Wt 164 lb 7.4 oz (74.6 kg)   SpO2 91%   BMI 23.60 kg/m²     Physical Exam  Constitutional:       Appearance: Normal appearance. Comments: Lethargic   HENT:      Head: Normocephalic and atraumatic. Right Ear: External ear normal.      Left Ear: External ear normal.   Eyes:      Extraocular Movements: Extraocular movements intact. Conjunctiva/sclera: Conjunctivae normal.   Cardiovascular:      Rate and Rhythm: Regular rhythm. Bradycardia present. Heart sounds: No murmur heard. No friction rub. No gallop. Pulmonary:      Effort: Pulmonary effort is normal.      Breath sounds: Normal breath sounds. Abdominal:      General: Abdomen is flat. There is no distension. Palpations: Abdomen is soft. There is no mass. Tenderness: There is abdominal tenderness (chronic, stable). There is no rebound. Hernia: No hernia is present. Musculoskeletal:         General: No swelling or deformity. Cervical back: Normal range of motion. Right lower leg: Edema present. Left lower leg: Edema present. Comments: Edema in arms. Third proximal digit on left hand amputated   Skin:     General: Skin is warm and dry. Comments: Multiple scars on back   Neurological:      General: No focal deficit present. Psychiatric:         Mood and Affect: Mood normal.         Thought Content:  Thought content normal.     Femoral line in place    Labs:  Recent Results (from the past 24 hour(s))   Arterial Blood Gas, Respiratory Only    Collection Time: 07/16/22 10:43 AM   Result Value Ref Range    POC Source Arterial     FIO2 21.0     PH 37 7.283 (L) 7.350 - 7.450    PCO2 37 45.5 (H) 35.0 - 45.0 mmHg    PO2 37 74.7 60.0 - 80.0 mmHg    HCO3 21.5 (L) 22.0 - 26.0 mmol/L    B.E. -5.0 (L) -3.0 - 3.0 mmol/L    O2 Sat 92.8 92.0 - 98.5 %     ID 3,114     DEVICE 15,065,521,400,820    POCT Glucose    Collection Time: 07/16/22 12:07 PM   Result Value Ref Range    Meter Glucose 88 74 - 99 mg/dL   POCT Glucose    Collection Time: 07/16/22  4:41 PM   Result Value Ref Range    Meter Glucose 86 74 - 99 mg/dL   POCT Glucose    Collection Time: 07/16/22  8:36 PM   Result Value Ref Range    Meter Glucose 185 (H) 74 - 99 mg/dL   CBC with Auto Differential    Collection Time: 07/17/22  5:15 AM   Result Value Ref Range    WBC 3.8 (L) 4.5 - 11.5 E9/L    RBC 3.24 (L) 3.50 - 5.50 E12/L    Hemoglobin 9.0 (L) 11.5 - 15.5 g/dL    Hematocrit 29.3 (L) 34.0 - 48.0 %    MCV 90.4 80.0 - 99.9 fL    MCH 27.8 26.0 - 35.0 pg    MCHC 30.7 (L) 32.0 - 34.5 %    RDW 17.3 (H) 11.5 - 15.0 fL    Platelets 59 (L) 327 - 450 E9/L    MPV NOT CALC 7.0 - 12.0 fL    Neutrophils % 60.7 43.0 - 80.0 %    Immature Granulocytes % 0.5 0.0 - 5.0 %    Lymphocytes % 25.0 20.0 - 42.0 %    Monocytes % 11.4 2.0 - 12.0 %    Eosinophils % 2.1 0.0 - 6.0 %    Basophils % 0.3 0.0 - 2.0 %    Neutrophils Absolute 2.28 1.80 - 7.30 E9/L    Immature Granulocytes # 0.02 E9/L    Lymphocytes Absolute 0.94 (L) 1.50 - 4.00 E9/L    Monocytes Absolute 0.43 0.10 - 0.95 E9/L    Eosinophils Absolute 0.08 0.05 - 0.50 E9/L    Basophils Absolute 0.01 0.00 - 0.20 E9/L   Phosphorus    Collection Time: 07/17/22  5:15 AM   Result Value Ref Range    Phosphorus 3.3 2.5 - 4.5 mg/dL   Magnesium    Collection Time: 07/17/22  5:15 AM   Result Value Ref Range    Magnesium 1.8 1.6 - 2.6 mg/dL   Comprehensive Metabolic Panel    Collection Time: 07/17/22  5:15 AM   Result Value Ref Range    Sodium 140 132 - 146 mmol/L    Potassium 4.0 3.5 - 5.0 mmol/L    Chloride 106 98 - 107 mmol/L    CO2 24 22 - 29 mmol/L    Anion Gap 10 7 - 16 mmol/L    Glucose 46 (L) 74 - 99 mg/dL    BUN 19 6 - 23 mg/dL    CREATININE 3.0 (H) 0.5 - 1.0 mg/dL    GFR Non-African American 19 >=60 mL/min/1.73    GFR African American 19     Calcium 9.0 8.6 - 10.2 mg/dL    Total Protein 5.1 (L) 6.4 - 8.3 g/dL    Albumin 2.7 (L) 3.5 - 5.2 g/dL    Total Bilirubin 0.4 0.0 - 1.2 mg/dL    Alkaline Phosphatase 166 (H) 35 - 104 U/L    ALT 59 (H) 0 - 32 U/L    AST 56 (H) 0 - 31 U/L   Platelet Confirmation    Collection Time: 07/17/22  5:15 AM   Result Value Ref Range    Platelet Confirmation CONFIRMED    POCT Glucose    Collection Time: 07/17/22  6:23 AM   Result Value Ref Range    Meter Glucose 47 (L) 74 - 99 mg/dL   POCT Glucose    Collection Time: 07/17/22  6:36 AM   Result showing new 1st degree AV block  Continue home ASA 81 mg QD  Continue home Ticagrelor 90 mg BID  ICU management      Chronic Vertebral Osteomyelitis  Continue home vancomycin 1000 mg three times per week  - Continue home doxycycline 100 mg BID     Hyperuricemia  Continue home Allopurinol 100 mg BID     Hyperlipidemia  Continue home Lipitor 80 mg nightly     Other: Appreciate ICU care     Pain Control:  Tylenol 650 mg Q6HR PRN for mild pain  DVT ppx: Aspirin 81 mg daily  GI ppx: Protonix 40 mg daily  Code Status: Full  Diet: General diet      Disposition: Discharge to [facility] (Non-Skilled)    Jeremiah Tovar MD   Family Medicine Resident PGY2  07/17/22   6:53 AM

## 2022-07-17 NOTE — PROGRESS NOTES
Blood pressure reading 123/67 in right upper arm, when checked in lower arm reading of 96/39. Second reading of 120/60 obtained in right upper arm. Additionally, BP readings 15-20 points lower in legs noted. Unable to use left arm d/t fistula. BPs to be taken from R upper arm.

## 2022-07-17 NOTE — PROGRESS NOTES
Critical Care Team - Daily Progress Note         Date and time: 7/17/2022 12:21 PM  Patient's name:  Venancio Lamb  Medical Record Number: [id number]  Patient's account/billing number: [de-identified]  Patient's YOB: 1955  Age: 78 y.o. Date of Admission: 7/11/2022 12:11 AM  Length of stay during current admission: 6      Primary Care Physician: Manish Scott MD  ICU Attending Physician: Dr. Clay Lights    Code Status: Full Code    Reason for ICU admission: hypotension      SUBJECTIVE:     OVERNIGHT EVENTS:      7/17/2022: Patient with HR in 60's, Levo restarted for hypotension after initially being able to wean, HgB 9.0, HD tomorrow, CT Abdomen and pelvis ordered. 7/16: Multiple complaints, nausea, headache and dyspnea. Hypothermic overnight. Norepi at 6.     7/15/2022: Off levophed, pressure with systolic > 90, HR remains in the 50's. No acute overnight events   7/14/2022: Still mildly hypotensive and bradycardiac with an inability to completely wean norepi. Mildly dizzy and nauseated overnight.       CURRENT VENTILATION STATUS:     [] Ventilator  [] BIPAP  [x] Nasal Cannula [] Room Air      IF INTUBATED, ET TUBE MARKING AT LOWER LIP:       cms    SECRETIONS Amount:  [] Small [] Moderate  [] Large  [x] None  Color:     [] White [] Colored  [] Bloody    SEDATION:  RAAS Score:  [] Propofol gtt  [] Versed gtt  [] Ativan gtt   [x] No Sedation    PARALYZED:  [x] No    [] Yes      VASOPRESSORS:  [] No    [x] Yes    If yes -   [x] Levophed       [] Dopamine     [] Vasopressin       [] Dobutamine  [] Phenylephrine         [] Epinephrine    CENTRAL LINES:     [] No   [] Yes   (Date of Insertion: 7/12  )           If yes -     [] Right IJ     [] Left IJ [x] Right Femoral [] Left Femoral                   [] Right Subclavian [] Left Subclavian       SOTOMAYOR'S CATHETER:   [x] No   [] Yes  (Date of Insertion:   )     URINE OUTPUT:            [] Good   [] Low              [x] Anuric      OBJECTIVE: VITAL SIGNS:  /63   Pulse 62   Temp (!) 96.6 °F (35.9 °C)   Resp 20   Ht 5' 10\" (1.778 m)   Wt 164 lb 7.4 oz (74.6 kg)   SpO2 92%   BMI 23.60 kg/m²   Tmax over 24 hours:  Temp (24hrs), Av.1 °F (36.2 °C), Min:96.6 °F (35.9 °C), Max:97.4 °F (36.3 °C)      Patient Vitals for the past 6 hrs:   BP Temp Temp src Pulse Resp SpO2   22 1200 -- (!) 96.6 °F (35.9 °C) -- -- -- --   22 1100 135/63 -- -- 62 20 --   22 1000 137/65 -- -- 63 10 --   22 0915 (!) 76/49 -- -- 64 14 --   22 0900 (!) 100/46 -- -- 66 19 --   22 0830 (!) 100/46 -- -- 66 16 92 %   22 0800 120/73 97.4 °F (36.3 °C) Axillary 65 13 96 %   22 0700 (!) 87/43 -- -- 61 11 92 %           Intake/Output Summary (Last 24 hours) at 2022 1221  Last data filed at 2022 1126  Gross per 24 hour   Intake 3091.03 ml   Output 900 ml   Net 2191.03 ml       Wt Readings from Last 2 Encounters:   22 164 lb 7.4 oz (74.6 kg)   22 151 lb (68.5 kg)     Body mass index is 23.6 kg/m².         PHYSICAL EXAMINATION:    General appearance - awake, in no acute distress  Mental status - alert, oriented to person, place, and time  Eyes - pupils equal and reactive, extraocular eye movements intact  Ears - right ear normal, left ear normal  Nose - normal and patent, no erythema, discharge or polyps  Mouth - mucous membranes moist, pharynx normal without lesions  Neck - supple, no significant adenopathy  Chest - clear to auscultation, no wheezes, rales or rhonchi, symmetric air entry  Heart - bradycardiac, normal rhythm  Abdomen - soft, minimally tender, nondistended, no masses or organomegaly  Neurological - lethargic, oriented, normal speech, no focal findings or movement disorder noted}  Extremities - no edema, right femoral site clean with no further bleeding  Skin - no rashes     Any additional physical findings:    MEDICATIONS:    Scheduled Meds:   midodrine  20 mg Oral TID     white petrolatum Topical BID    epoetin fani-epbx  3,000 Units SubCUTAneous Once per day on Mon Wed Fri    pantoprazole  40 mg Oral QAM AC    latanoprost  1 drop Both Eyes Daily    brimonidine  1 drop Right Eye BID    vancomycin  1,000 mg IntraVENous Q MWF    allopurinol  100 mg Oral Daily    atorvastatin  80 mg Oral Nightly    doxycycline hyclate  100 mg Oral 2 times per day    gabapentin  200 mg Oral TID    ticagrelor  90 mg Oral BID    sodium chloride flush  5-40 mL IntraVENous 2 times per day    aspirin  81 mg Oral Daily    insulin lispro  0-6 Units SubCUTAneous TID WC    insulin lispro  0-3 Units SubCUTAneous Nightly    lanthanum  1,000 mg Oral TID WC    insulin glargine  5 Units SubCUTAneous Nightly    sodium chloride  500 mL IntraVENous Once     Continuous Infusions:   norepinephrine Stopped (07/17/22 1020)    sodium chloride 75 mL/hr at 07/17/22 1126    sodium chloride      dextrose       PRN Meds:   sodium chloride flush, 5-40 mL, PRN  sodium chloride, , PRN  ondansetron, 4 mg, Q8H PRN   Or  ondansetron, 4 mg, Q6H PRN  polyethylene glycol, 17 g, Daily PRN  acetaminophen, 650 mg, Q6H PRN   Or  acetaminophen, 650 mg, Q6H PRN  glucose, 4 tablet, PRN  dextrose bolus, 125 mL, PRN   Or  dextrose bolus, 250 mL, PRN  glucagon (rDNA), 1 mg, PRN  dextrose, 100 mL/hr, PRN        VENT SETTINGS (Comprehensive) (if applicable):      Additional Respiratory Assessments  Heart Rate: 62  Resp: 20  SpO2: 92 %    ABGs:   Recent Labs     07/16/22  1043   HCO3 21.5*   BE -5.0*   O2SAT 92.8         Laboratory findings:    Complete Blood Count:   Recent Labs     07/15/22  0520 07/16/22  0528 07/17/22  0515   WBC 3.6* 7.3 3.8*   HGB 8.2* 10.5* 9.0*   HCT 26.1* 33.4* 29.3*   PLT 67* 100* 59*          Last 3 Blood Glucose:   Recent Labs     07/15/22  0520 07/16/22  0528 07/17/22  0515   GLUCOSE 96 116* 46*          PT/INR:    Lab Results   Component Value Date/Time    PROTIME 12.3 06/15/2022 07:14 PM    PROTIME 10.4 02/15/2012 10:40 AM    INR 1.1 06/15/2022 07:14 PM     PTT:    Lab Results   Component Value Date/Time    APTT 98.6 05/05/2022 06:30 AM       Comprehensive Metabolic Profile:   Recent Labs     07/15/22  0520 07/16/22  0528 07/17/22  0515    139 140   K 4.1 4.6 4.0    105 106   CO2 21* 18* 24   BUN 28* 31* 19   CREATININE 3.9* 4.3* 3.0*   GLUCOSE 96 116* 46*   CALCIUM 8.6 9.5 9.0   PROT 4.8* 6.1* 5.1*   LABALBU 2.7* 3.6 2.7*   BILITOT 0.3 0.4 0.4   ALKPHOS 146* 199* 166*   AST 47* 75* 56*   ALT 55* 78* 59*        Magnesium:   Lab Results   Component Value Date/Time    MG 1.8 07/17/2022 05:15 AM     Phosphorus:   Lab Results   Component Value Date/Time    PHOS 3.3 07/17/2022 05:15 AM     Ionized Calcium:   Lab Results   Component Value Date/Time    CAION 1.22 11/08/2017 04:28 AM        Urinalysis:     Troponin: No results for input(s): TROPONINI in the last 72 hours. Microbiology:    Cultures during this admission:     Blood cultures:                 [] None drawn      [x] Negative             []  Positive (Details:  )  Urine Culture:                   [x] None drawn      [] Negative             []  Positive (Details:  )  Sputum Culture:               [x] None drawn       [] Negative             []  Positive (Details:  )   Endotracheal aspirate:     [x] None drawn       [] Negative             []  Positive (Details:  )     Other pertinent Labs:       Radiology/Imaging:              ASSESSMENT:     Principal Problem:    Hypotension  Active Problems:    Atherosclerosis of native coronary artery of native heart without angina pectoris    Vertebral osteomyelitis, chronic (HCC)    Symptomatic sinus bradycardia    Chronic diastolic CHF (congestive heart failure) (HCC)    ESRD (end stage renal disease) on dialysis ([facility] [address]    Mixed hyperlipidemia  Resolved Problems:    * No resolved hospital problems. *      Additional assessment:    Mildly acidemic mixed met and resp but mild so see response to dialysis.   Persistent abdominal pain ct abd ordered as d/w FM Staff yesterday  Hypoglycemia        PLAN:     WEAN PER PROTOCOL:  [] No   [] Yes  [x] N/A    DISCONTINUE ANY LABS:   [x] No   [] Yes    ICU PROPHYLAXIS:  Stress ulcer:  [x] PPI Agent  [] [id number] [] Sucralfate  [] Other:  VTE:   [] Enoxaparin  [] Unfract. Heparin Subcut  [x] EPC Cuffs    NUTRITION:  [] NPO [] Tube Feeding (Specify: ) [] TPN  [x] PO (Diet: ADULT DIET; Regular; 4 carb choices (60 gm/meal)  ADULT ORAL NUTRITION SUPPLEMENT; Breakfast, Dinner; Renal Oral Supplement)    HOME MEDICATIONS RECONCILED: [] No  [x] Yes    INSULIN DRIP:   [x] No   [] Yes    CONSULTATION NEEDED:  [x] No   [] Yes    FAMILY UPDATED:    [x] No   [] Yes    TRANSFER OUT OF ICU:   [x] No   [] Yes    ADDITIONAL PLAN:  On midodrine 20 mg   HD tomorrow  Nephrology following  Trended HGB is back to baseline, pt is a [employer]   SO NO BLOOD TRANSFUSION ANYWAY  Will monitor BP and Wean pressors as able. CT Abdomen and Pelvis  Spoke to CT and Nephrology  Will plan for CT tonight at latest time with planned Dialysis tomorrow morning    Critical Care Attending Addendum:    Patient seen and examined with the house staff. X-rays personally reviewed through the PACS. Family is updated at the bedside as available. Additional findings listed below as necessary. Additional comments:  Hypotension is better when measuring bp in [place]. Continue midodrine and wean off norepi. Abd pain/ tenderness for ct abd/ pelvis tonight with hd tomorrow. Hypoglycemia hold Lantus. All cell lines dropped again but nothing needs done.

## 2022-07-17 NOTE — PROGRESS NOTES
Department of Internal Medicine  Nephrology Attending Progress Note        SUBJECTIVE:  Mrs Anabella Landry being followed for ESKD    22: Pt awake alert appropriate eating breakfast at the time of my visit. States she had visual hallucinations yesterday and today but knew she had them. She is now off norepi    PMH   End-stage renal disease on dialysis   Hypertension  Hyperlipidemia  Type 2 diabetes with neuropathy, retinopathy  Anemia of chronic kidney disease (patient Jehovah witness)  Secondary hyperparathyroid disease of renal origin  History of breast cancer rt side  History of coronary disease status post heart cath with balloon angioplasty 22  Carpal tunnel syndrome with bilateral release  History of osteomyelitis/discitis  L1-L2 on IV Vanco for 6 weeks now on suppressive doxycycline  History of spinal fusion of L3/L4/L5  History of glaucoma    Physical Exam:    Vitals:    22 0915   BP: (!) 76/49   Pulse: 64   Resp: 14   Temp:    SpO2:    MAXIMUM TEMPERATURE OVER 24HRS:  Temp (24hrs), Av °F (36.1 °C), Min:96.7 °F (35.9 °C), Max:97.4 °F (36.3 °C)    TEMPERATURE RANGE OVER 24HRS:   Temp  Av °F (36.1 °C)  Min: 96.7 °F (35.9 °C)  Max: 97.4 °F (36.3 °C)  24HR RESPIRATORY RATE RANGE:  Resp  Av.5  Min: 11  Max: 34  24HR PULSE RANGE: Pulse  Av.7  Min: 59  Max: 75  CURRENT BLOOD PRESSURE:  BP: (!) 76/49    24HR BLOOD PRESSURE RANGE:  Systolic (97HUY), XVY:33 , Min:76 , RODRIGUEZ:111   ; Diastolic (65MKN), WZZ:69, Min:39, Max:73    8HR BLOOD PRESSURE RANGE:  BP: ()/(39-73)   24HR PULSE OXIMETRY RANGE:  SpO2  Av.3 %  Min: 91 %  Max: 100 %  24HR INTAKE/OUTPUT:      Intake/Output Summary (Last 24 hours) at 2022 0950  Last data filed at 2022 0554  Gross per 24 hour   Intake 2676.07 ml   Output 900 ml   Net 1776.07 ml     8HR INTAKE OUTPUT:  No intake/output data recorded.   VENT SETTINGS:       Additional Respiratory Assessments  Heart Rate: 64  Resp: 14  SpO2: 92 %      General: Awake Alert and appropriate  Neck: No JVD noted  Lungs: Diminished at the bases bilaterally. CV: Regular rate and rhythm. No rub  Abd: Soft, tender diffusely  nondistended. Active bowel sounds  Skin: Warm and dry. No rash on exposed extremities  Ext: 1+ RUE edema (h/o breast CA);  No BLE edema ; LUE AV fistula   Neuro: Awake alert and appropriate    DATA:    CBC with Differential:    Lab Results   Component Value Date/Time    WBC 3.8 07/17/2022 05:15 AM    RBC 3.24 07/17/2022 05:15 AM    HGB 9.0 07/17/2022 05:15 AM    HCT 29.3 07/17/2022 05:15 AM    PLT 59 07/17/2022 05:15 AM    MCV 90.4 07/17/2022 05:15 AM    MCH 27.8 07/17/2022 05:15 AM    MCHC 30.7 07/17/2022 05:15 AM    RDW 17.3 07/17/2022 05:15 AM    NRBC 1.8 07/15/2022 05:20 AM    SEGSPCT 70 03/12/2014 10:53 AM    BANDSPCT 1 02/18/2015 10:35 AM    LYMPHOPCT 25.0 07/17/2022 05:15 AM    PROMYELOPCT 1.8 02/18/2017 04:00 AM    MONOPCT 11.4 07/17/2022 05:15 AM    MYELOPCT 0.9 10/24/2017 10:45 AM    BASOPCT 0.3 07/17/2022 05:15 AM    MONOSABS 0.43 07/17/2022 05:15 AM    LYMPHSABS 0.94 07/17/2022 05:15 AM    EOSABS 0.08 07/17/2022 05:15 AM    BASOSABS 0.01 07/17/2022 05:15 AM     CMP:    Lab Results   Component Value Date/Time     07/17/2022 05:15 AM    K 4.0 07/17/2022 05:15 AM    K 4.7 07/11/2022 12:46 AM     07/17/2022 05:15 AM    CO2 24 07/17/2022 05:15 AM    BUN 19 07/17/2022 05:15 AM    CREATININE 3.0 07/17/2022 05:15 AM    GFRAA 19 07/17/2022 05:15 AM    LABGLOM 19 07/17/2022 05:15 AM    GLUCOSE 46 07/17/2022 05:15 AM    GLUCOSE 46 04/02/2012 11:00 AM    PROT 5.1 07/17/2022 05:15 AM    LABALBU 2.7 07/17/2022 05:15 AM    LABALBU 3.8 04/02/2012 11:00 AM    CALCIUM 9.0 07/17/2022 05:15 AM    BILITOT 0.4 07/17/2022 05:15 AM    ALKPHOS 166 07/17/2022 05:15 AM    AST 56 07/17/2022 05:15 AM    ALT 59 07/17/2022 05:15 AM            midodrine  20 mg Oral TID WC    white petrolatum   Topical BID    epoetin fani-epbx  3,000 Units SubCUTAneous Once per day on Mon Wed Fri    pantoprazole  40 mg Oral QAM AC    latanoprost  1 drop Both Eyes Daily    brimonidine  1 drop Right Eye BID    vancomycin  1,000 mg IntraVENous Q MWF    allopurinol  100 mg Oral Daily    atorvastatin  80 mg Oral Nightly    doxycycline hyclate  100 mg Oral 2 times per day    gabapentin  200 mg Oral TID    ticagrelor  90 mg Oral BID    sodium chloride flush  5-40 mL IntraVENous 2 times per day    aspirin  81 mg Oral Daily    insulin lispro  0-6 Units SubCUTAneous TID WC    insulin lispro  0-3 Units SubCUTAneous Nightly    lanthanum  1,000 mg Oral TID     insulin glargine  5 Units SubCUTAneous Nightly    sodium chloride  500 mL IntraVENous Once      norepinephrine Stopped (07/17/22 0758)    sodium chloride 75 mL/hr at 07/17/22 0830    sodium chloride      dextrose       sodium chloride flush, sodium chloride, ondansetron **OR** ondansetron, polyethylene glycol, acetaminophen **OR** acetaminophen, glucose, dextrose bolus **OR** dextrose bolus, glucagon (rDNA), dextrose    IMPRESSION/RECOMMENDATIONS:      1. End-stage renal disease with Intradialytic Hypotension  PLAN:  1. IHD on  Monday Wednesday Friday -next treatment in the AM 7/18/22  2. Follow labs  3. Will use cooled dialysate with next treatment    2. Hyperkalemia  K+ WNL 4.0  PLAN:  1. Follow K+    3. Hypotension with   Sinus bradycardia   metoprolol and timolol eye drops discontinued-remains bradycardic-concern for autonomic dysfunction-currently on pressor-Norepi  PLAN:  1. Continue to monitor-pressor off    4. Anemia of chronic kidney disease with Thrombocytopenia and Leukopenia  HgB 11.7-->8.9 -->8.2 -->10.5-->9.0 with decrease in -->79-->67 -->100-->59  PLAN:  1. Continue JAYSHREE   2. Follow H/H    5. Secondary hyperparathyroidism of renal origin with Hyperphosphatemia  PO4 now WNL  PLAN:  1. Follow Ca++ and PO4  2.  Hold PO4 binder      Clare Campos MD  7/17/2022 9:50 AM

## 2022-07-17 NOTE — PROGRESS NOTES
[facility]  Family Medicine Attending    S: 78 y.o. female with hypotension, also has chronic abd pains. Patient seen with rounding team this AM. Please see resident note. Patient reports ongoing abd pains, but offers few details about such pains. She is currently dialyzing. She reports feeling sick. O: VS- Blood pressure (!) 99/55, pulse 71, temperature 97.3 °F (36.3 °C), temperature source Axillary, resp. rate 26, height 5' 10\" (1.778 m), weight 144 lb 13.5 oz (65.7 kg), SpO2 94 %. Exam is as noted by resident with the following changes, additions or corrections:  Awake/alert, short responses to some questions  RRR  CTA  S/tender throughout abd, no rebound  LE no edema    Impressions:   Principal Problem:    Hypotension  Active Problems:    Atherosclerosis of native coronary artery of native heart without angina pectoris    Vertebral osteomyelitis, chronic (HCC)    Symptomatic sinus bradycardia    Chronic diastolic CHF (congestive heart failure) ([facility])    ESRD (end stage renal disease) on dialysis ([facility] [address]    Mixed hyperlipidemia  Resolved Problems:    * No resolved hospital problems. *      Plan:   Discussed with Dr Boogie Fitting - probably will get CT abd for better eval of pains. Hypotension with some suspicions for bradycardia contributing. Will cont to follow    Attending Physician Statement   I have reviewed the chart with the teaching service, including family medicine documentation dated 7/16/22 and yesterday (if available). Pertinent radiology and/or labs reviewed. I have seen the patient in conjunction with the resident teaching service. I personally reviewed and performed key elements of the history and exam. I agree with the assessment, plan and orders as documented by the resident with any changes noted above. Please refer to the resident note(s) for additional information.     Philly Roach MD

## 2022-07-18 LAB
ALBUMIN SERPL-MCNC: 2.6 G/DL (ref 3.5–5.2)
ALP BLD-CCNC: 155 U/L (ref 35–104)
ALT SERPL-CCNC: 54 U/L (ref 0–32)
ANION GAP SERPL CALCULATED.3IONS-SCNC: 9 MMOL/L (ref 7–16)
AST SERPL-CCNC: 52 U/L (ref 0–31)
BASOPHILS ABSOLUTE: 0.01 E9/L (ref 0–0.2)
BASOPHILS RELATIVE PERCENT: 0.3 % (ref 0–2)
BILIRUB SERPL-MCNC: 0.4 MG/DL (ref 0–1.2)
BUN BLDV-MCNC: 20 MG/DL (ref 6–23)
CALCIUM SERPL-MCNC: 9 MG/DL (ref 8.6–10.2)
CHLORIDE BLD-SCNC: 104 MMOL/L (ref 98–107)
CO2: 22 MMOL/L (ref 22–29)
CREAT SERPL-MCNC: 3.4 MG/DL (ref 0.5–1)
EOSINOPHILS ABSOLUTE: 0.09 E9/L (ref 0.05–0.5)
EOSINOPHILS RELATIVE PERCENT: 2.3 % (ref 0–6)
GFR AFRICAN AMERICAN: 16
GFR NON-AFRICAN AMERICAN: 16 ML/MIN/1.73
GLUCOSE BLD-MCNC: 80 MG/DL (ref 74–99)
HCT VFR BLD CALC: 27.4 % (ref 34–48)
HEMOGLOBIN: 8.6 G/DL (ref 11.5–15.5)
HEPARIN PF4 ANTIBODY: 0.06 OD
IMMATURE GRANULOCYTES #: 0.02 E9/L
IMMATURE GRANULOCYTES %: 0.5 % (ref 0–5)
LYMPHOCYTES ABSOLUTE: 0.89 E9/L (ref 1.5–4)
LYMPHOCYTES RELATIVE PERCENT: 23.2 % (ref 20–42)
MAGNESIUM: 1.7 MG/DL (ref 1.6–2.6)
MCH RBC QN AUTO: 27.9 PG (ref 26–35)
MCHC RBC AUTO-ENTMCNC: 31.4 % (ref 32–34.5)
MCV RBC AUTO: 89 FL (ref 80–99.9)
METER GLUCOSE: 106 MG/DL (ref 74–99)
METER GLUCOSE: 158 MG/DL (ref 74–99)
METER GLUCOSE: 81 MG/DL (ref 74–99)
MONOCYTES ABSOLUTE: 0.36 E9/L (ref 0.1–0.95)
MONOCYTES RELATIVE PERCENT: 9.4 % (ref 2–12)
NEUTROPHILS ABSOLUTE: 2.46 E9/L (ref 1.8–7.3)
NEUTROPHILS RELATIVE PERCENT: 64.3 % (ref 43–80)
PDW BLD-RTO: 17.3 FL (ref 11.5–15)
PHOSPHORUS: 3.9 MG/DL (ref 2.5–4.5)
PLATELET # BLD: 57 E9/L (ref 130–450)
PLATELET CONFIRMATION: NORMAL
PMV BLD AUTO: ABNORMAL FL (ref 7–12)
POTASSIUM SERPL-SCNC: 4.3 MMOL/L (ref 3.5–5)
RBC # BLD: 3.08 E12/L (ref 3.5–5.5)
SODIUM BLD-SCNC: 135 MMOL/L (ref 132–146)
TOTAL PROTEIN: 4.9 G/DL (ref 6.4–8.3)
WBC # BLD: 3.8 E9/L (ref 4.5–11.5)

## 2022-07-18 PROCEDURE — 85025 COMPLETE CBC W/AUTO DIFF WBC: CPT

## 2022-07-18 PROCEDURE — 36592 COLLECT BLOOD FROM PICC: CPT

## 2022-07-18 PROCEDURE — 84100 ASSAY OF PHOSPHORUS: CPT

## 2022-07-18 PROCEDURE — C1751 CATH, INF, PER/CENT/MIDLINE: HCPCS

## 2022-07-18 PROCEDURE — 6360000002 HC RX W HCPCS: Performed by: INTERNAL MEDICINE

## 2022-07-18 PROCEDURE — 82962 GLUCOSE BLOOD TEST: CPT

## 2022-07-18 PROCEDURE — 76937 US GUIDE VASCULAR ACCESS: CPT

## 2022-07-18 PROCEDURE — 6370000000 HC RX 637 (ALT 250 FOR IP): Performed by: INTERNAL MEDICINE

## 2022-07-18 PROCEDURE — 36415 COLL VENOUS BLD VENIPUNCTURE: CPT

## 2022-07-18 PROCEDURE — 86706 HEP B SURFACE ANTIBODY: CPT

## 2022-07-18 PROCEDURE — 99232 SBSQ HOSP IP/OBS MODERATE 35: CPT | Performed by: FAMILY MEDICINE

## 2022-07-18 PROCEDURE — 2580000003 HC RX 258: Performed by: INTERNAL MEDICINE

## 2022-07-18 PROCEDURE — 83735 ASSAY OF MAGNESIUM: CPT

## 2022-07-18 PROCEDURE — 05H933Z INSERTION OF INFUSION DEVICE INTO RIGHT BRACHIAL VEIN, PERCUTANEOUS APPROACH: ICD-10-PCS | Performed by: FAMILY MEDICINE

## 2022-07-18 PROCEDURE — 2500000003 HC RX 250 WO HCPCS: Performed by: INTERNAL MEDICINE

## 2022-07-18 PROCEDURE — 80053 COMPREHEN METABOLIC PANEL: CPT

## 2022-07-18 PROCEDURE — 87340 HEPATITIS B SURFACE AG IA: CPT

## 2022-07-18 PROCEDURE — 6370000000 HC RX 637 (ALT 250 FOR IP)

## 2022-07-18 PROCEDURE — 90935 HEMODIALYSIS ONE EVALUATION: CPT

## 2022-07-18 PROCEDURE — 36410 VNPNXR 3YR/> PHY/QHP DX/THER: CPT

## 2022-07-18 PROCEDURE — 2060000000 HC ICU INTERMEDIATE R&B

## 2022-07-18 RX ORDER — SODIUM CHLORIDE 0.9 % (FLUSH) 0.9 %
5-40 SYRINGE (ML) INJECTION EVERY 12 HOURS SCHEDULED
Status: DISCONTINUED | OUTPATIENT
Start: 2022-07-18 | End: 2022-07-22 | Stop reason: HOSPADM

## 2022-07-18 RX ORDER — SODIUM CHLORIDE 0.9 % (FLUSH) 0.9 %
5-40 SYRINGE (ML) INJECTION PRN
Status: DISCONTINUED | OUTPATIENT
Start: 2022-07-18 | End: 2022-07-22 | Stop reason: HOSPADM

## 2022-07-18 RX ORDER — SODIUM CHLORIDE 9 MG/ML
INJECTION, SOLUTION INTRAVENOUS PRN
Status: DISCONTINUED | OUTPATIENT
Start: 2022-07-18 | End: 2022-07-22 | Stop reason: HOSPADM

## 2022-07-18 RX ORDER — SENNA PLUS 8.6 MG/1
1 TABLET ORAL NIGHTLY
Status: DISCONTINUED | OUTPATIENT
Start: 2022-07-18 | End: 2022-07-18

## 2022-07-18 RX ORDER — HEPARIN SODIUM (PORCINE) LOCK FLUSH IV SOLN 100 UNIT/ML 100 UNIT/ML
1 SOLUTION INTRAVENOUS PRN
Status: DISCONTINUED | OUTPATIENT
Start: 2022-07-18 | End: 2022-07-22 | Stop reason: HOSPADM

## 2022-07-18 RX ORDER — SENNA PLUS 8.6 MG/1
1 TABLET ORAL NIGHTLY
Status: DISCONTINUED | OUTPATIENT
Start: 2022-07-18 | End: 2022-07-22 | Stop reason: HOSPADM

## 2022-07-18 RX ORDER — HEPARIN SODIUM (PORCINE) LOCK FLUSH IV SOLN 100 UNIT/ML 100 UNIT/ML
1 SOLUTION INTRAVENOUS EVERY 12 HOURS SCHEDULED
Status: DISCONTINUED | OUTPATIENT
Start: 2022-07-18 | End: 2022-07-22 | Stop reason: HOSPADM

## 2022-07-18 RX ORDER — POLYETHYLENE GLYCOL 3350 17 G/17G
17 POWDER, FOR SOLUTION ORAL DAILY
Status: DISCONTINUED | OUTPATIENT
Start: 2022-07-18 | End: 2022-07-22 | Stop reason: HOSPADM

## 2022-07-18 RX ADMIN — VANCOMYCIN HYDROCHLORIDE 1000 MG: 1 INJECTION, POWDER, LYOPHILIZED, FOR SOLUTION INTRAVENOUS at 13:30

## 2022-07-18 RX ADMIN — TICAGRELOR 90 MG: 90 TABLET ORAL at 21:52

## 2022-07-18 RX ADMIN — GABAPENTIN 200 MG: 100 CAPSULE ORAL at 14:36

## 2022-07-18 RX ADMIN — GABAPENTIN 200 MG: 100 CAPSULE ORAL at 09:17

## 2022-07-18 RX ADMIN — Medication 10 ML: at 21:54

## 2022-07-18 RX ADMIN — BRIMONIDINE TARTRATE 1 DROP: 2 SOLUTION OPHTHALMIC at 21:53

## 2022-07-18 RX ADMIN — PETROLATUM: 420 OINTMENT TOPICAL at 21:59

## 2022-07-18 RX ADMIN — EPOETIN ALFA-EPBX 3000 UNITS: 3000 INJECTION, SOLUTION INTRAVENOUS; SUBCUTANEOUS at 09:17

## 2022-07-18 RX ADMIN — PETROLATUM: 420 OINTMENT TOPICAL at 13:25

## 2022-07-18 RX ADMIN — LIDOCAINE HYDROCHLORIDE 2 ML: 10 SOLUTION INTRAVENOUS at 16:00

## 2022-07-18 RX ADMIN — MIDODRINE HYDROCHLORIDE 20 MG: 5 TABLET ORAL at 09:16

## 2022-07-18 RX ADMIN — ACETAMINOPHEN 650 MG: 325 TABLET ORAL at 22:16

## 2022-07-18 RX ADMIN — DOXYCYCLINE HYCLATE 100 MG: 100 CAPSULE ORAL at 13:24

## 2022-07-18 RX ADMIN — SENNOSIDES 8.6 MG: 8.6 TABLET, FILM COATED ORAL at 21:52

## 2022-07-18 RX ADMIN — SODIUM CHLORIDE, PRESERVATIVE FREE 10 ML: 5 INJECTION INTRAVENOUS at 13:25

## 2022-07-18 RX ADMIN — GABAPENTIN 200 MG: 100 CAPSULE ORAL at 21:52

## 2022-07-18 RX ADMIN — LATANOPROST 1 DROP: 50 SOLUTION OPHTHALMIC at 21:53

## 2022-07-18 RX ADMIN — SODIUM CHLORIDE, PRESERVATIVE FREE 100 UNITS: 5 INJECTION INTRAVENOUS at 21:52

## 2022-07-18 RX ADMIN — ASPIRIN 81 MG: 81 TABLET, COATED ORAL at 16:42

## 2022-07-18 RX ADMIN — POLYETHYLENE GLYCOL 3350 17 G: 17 POWDER, FOR SOLUTION ORAL at 14:35

## 2022-07-18 RX ADMIN — LANTHANUM CARBONATE 1000 MG: 500 TABLET, CHEWABLE ORAL at 13:23

## 2022-07-18 RX ADMIN — ALLOPURINOL 100 MG: 100 TABLET ORAL at 13:23

## 2022-07-18 RX ADMIN — PANTOPRAZOLE SODIUM 40 MG: 40 TABLET, DELAYED RELEASE ORAL at 07:48

## 2022-07-18 RX ADMIN — ATORVASTATIN CALCIUM 80 MG: 40 TABLET, FILM COATED ORAL at 21:52

## 2022-07-18 RX ADMIN — DOXYCYCLINE HYCLATE 100 MG: 100 CAPSULE ORAL at 21:52

## 2022-07-18 RX ADMIN — LANTHANUM CARBONATE 1000 MG: 500 TABLET, CHEWABLE ORAL at 17:09

## 2022-07-18 RX ADMIN — BRIMONIDINE TARTRATE 1 DROP: 2 SOLUTION OPHTHALMIC at 13:24

## 2022-07-18 ASSESSMENT — PAIN DESCRIPTION - LOCATION
LOCATION: BACK;ABDOMEN;LEG
LOCATION: ABDOMEN
LOCATION: BACK

## 2022-07-18 ASSESSMENT — ENCOUNTER SYMPTOMS
ABDOMINAL PAIN: 1
CONSTIPATION: 1
BACK PAIN: 1
ABDOMINAL DISTENTION: 0
SHORTNESS OF BREATH: 0

## 2022-07-18 ASSESSMENT — PAIN SCALES - GENERAL
PAINLEVEL_OUTOF10: 0
PAINLEVEL_OUTOF10: 0
PAINLEVEL_OUTOF10: 5
PAINLEVEL_OUTOF10: 0
PAINLEVEL_OUTOF10: 2
PAINLEVEL_OUTOF10: 7
PAINLEVEL_OUTOF10: 2
PAINLEVEL_OUTOF10: 0

## 2022-07-18 ASSESSMENT — PAIN DESCRIPTION - ORIENTATION
ORIENTATION: RIGHT
ORIENTATION: MID

## 2022-07-18 ASSESSMENT — PAIN DESCRIPTION - DESCRIPTORS
DESCRIPTORS: CRAMPING;DISCOMFORT;ACHING
DESCRIPTORS: DISCOMFORT;ACHING
DESCRIPTORS: DISCOMFORT;DULL

## 2022-07-18 ASSESSMENT — PAIN - FUNCTIONAL ASSESSMENT: PAIN_FUNCTIONAL_ASSESSMENT: ACTIVITIES ARE NOT PREVENTED

## 2022-07-18 ASSESSMENT — PAIN DESCRIPTION - FREQUENCY: FREQUENCY: INTERMITTENT

## 2022-07-18 NOTE — PROGRESS NOTES
I spoke with Dr Lenny Nava prior to placing a midline in this patient. The patient has a left arm fistula. She has a history of having a right mastectomy with lymph node removal. I was unable to place a PIV in her right arm. Femoral line was placed 7/11/22. Per Dr Lenny Nava note, the central line needs to be removed and a right  arm midline is needed to provide vascular access for this patient.

## 2022-07-18 NOTE — PROGRESS NOTES
Department of Internal Medicine  Nephrology Attending Progress Note        SUBJECTIVE:  Mrs Reggie Dancer being followed for ESKD    22: Pt awake alert appropriate eating lunch at the time of my visit. States she had visual hallucinations again today but knew she had them.  I saw her at the end of Dialysis and her /71 post rinse back with a HR 71    PMH   End-stage renal disease on dialysis   Hypertension  Hyperlipidemia  Type 2 diabetes with neuropathy, retinopathy  Anemia of chronic kidney disease (patient Jehovah witness)  Secondary hyperparathyroid disease of renal origin  History of breast cancer rt side  History of coronary disease status post heart cath with balloon angioplasty 22  Carpal tunnel syndrome with bilateral release  History of osteomyelitis/discitis  L1-L2 on IV Vanco for 6 weeks now on suppressive doxycycline  History of spinal fusion of L3/L4/L5  History of glaucoma    Physical Exam:    Vitals:    22 1300   BP: (!) 159/71   Pulse: 71   Resp: 14   Temp:    SpO2: 96%   MAXIMUM TEMPERATURE OVER 24HRS:  Temp (24hrs), Av.8 °F (36 °C), Min:96 °F (35.6 °C), Max:98 °F (36.7 °C)    TEMPERATURE RANGE OVER 24HRS:   Temp  Av.8 °F (36 °C)  Min: 96 °F (35.6 °C)  Max: 98 °F (36.7 °C)  24HR RESPIRATORY RATE RANGE:  Resp  Av.8  Min: 8  Max: 22  24HR PULSE RANGE: Pulse  Av.9  Min: 58  Max: 71  CURRENT BLOOD PRESSURE:  BP: (!) 159/71    24HR BLOOD PRESSURE RANGE:  Systolic (60LKL), QGC:539 , Min:97 , IWT:596   ; Diastolic (77EUH), XEA:03, Min:50, Max:93    8HR BLOOD PRESSURE RANGE:  BP: ()/(50-75)   24HR PULSE OXIMETRY RANGE:  SpO2  Av.9 %  Min: 93 %  Max: 100 %  24HR INTAKE/OUTPUT:      Intake/Output Summary (Last 24 hours) at 2022 1457  Last data filed at 2022 1230  Gross per 24 hour   Intake 1397.75 ml   Output 2210 ml   Net -812.25 ml     8HR INTAKE OUTPUT:  In: 20 [I.V.:20]  Out: 2200   VENT SETTINGS:       Additional Respiratory Assessments  Heart Rate: 71  Resp: 14  SpO2: 96 %      General: Awake Alert and appropriate  Neck: No JVD noted  Lungs: Diminished at the bases bilaterally. CV: Regular rate and rhythm. No rub  Abd: Soft, tender diffusely  nondistended. Active bowel sounds  Skin: Warm and dry. No rash on exposed extremities  Ext: 1+ RUE edema (h/o breast CA);  No BLE edema ; LUE AV fistula   Neuro: Awake alert and appropriate    DATA:    CBC with Differential:    Lab Results   Component Value Date/Time    WBC 3.8 07/18/2022 05:00 AM    RBC 3.08 07/18/2022 05:00 AM    HGB 8.6 07/18/2022 05:00 AM    HCT 27.4 07/18/2022 05:00 AM    PLT 57 07/18/2022 05:00 AM    MCV 89.0 07/18/2022 05:00 AM    MCH 27.9 07/18/2022 05:00 AM    MCHC 31.4 07/18/2022 05:00 AM    RDW 17.3 07/18/2022 05:00 AM    NRBC 1.8 07/15/2022 05:20 AM    SEGSPCT 70 03/12/2014 10:53 AM    BANDSPCT 1 02/18/2015 10:35 AM    LYMPHOPCT 23.2 07/18/2022 05:00 AM    PROMYELOPCT 1.8 02/18/2017 04:00 AM    MONOPCT 9.4 07/18/2022 05:00 AM    MYELOPCT 0.9 10/24/2017 10:45 AM    BASOPCT 0.3 07/18/2022 05:00 AM    MONOSABS 0.36 07/18/2022 05:00 AM    LYMPHSABS 0.89 07/18/2022 05:00 AM    EOSABS 0.09 07/18/2022 05:00 AM    BASOSABS 0.01 07/18/2022 05:00 AM     CMP:    Lab Results   Component Value Date/Time     07/18/2022 05:00 AM    K 4.3 07/18/2022 05:00 AM    K 4.7 07/11/2022 12:46 AM     07/18/2022 05:00 AM    CO2 22 07/18/2022 05:00 AM    BUN 20 07/18/2022 05:00 AM    CREATININE 3.4 07/18/2022 05:00 AM    GFRAA 16 07/18/2022 05:00 AM    LABGLOM 16 07/18/2022 05:00 AM    GLUCOSE 80 07/18/2022 05:00 AM    GLUCOSE 46 04/02/2012 11:00 AM    PROT 4.9 07/18/2022 05:00 AM    LABALBU 2.6 07/18/2022 05:00 AM    LABALBU 3.8 04/02/2012 11:00 AM    CALCIUM 9.0 07/18/2022 05:00 AM    BILITOT 0.4 07/18/2022 05:00 AM    ALKPHOS 155 07/18/2022 05:00 AM    AST 52 07/18/2022 05:00 AM    ALT 54 07/18/2022 05:00 AM            polyethylene glycol  17 g Oral Daily    senna  1 tablet Oral Nightly    midodrine  20 mg Oral TID WC    white petrolatum   Topical BID    epoetin fani-epbx  3,000 Units SubCUTAneous Once per day on Mon Wed Fri    pantoprazole  40 mg Oral QAM AC    latanoprost  1 drop Both Eyes Daily    brimonidine  1 drop Right Eye BID    vancomycin  1,000 mg IntraVENous Q MWF    allopurinol  100 mg Oral Daily    atorvastatin  80 mg Oral Nightly    doxycycline hyclate  100 mg Oral 2 times per day    gabapentin  200 mg Oral TID    ticagrelor  90 mg Oral BID    sodium chloride flush  5-40 mL IntraVENous 2 times per day    aspirin  81 mg Oral Daily    insulin lispro  0-6 Units SubCUTAneous TID WC    insulin lispro  0-3 Units SubCUTAneous Nightly    lanthanum  1,000 mg Oral TID WC      sodium chloride      dextrose       sodium chloride flush, sodium chloride, ondansetron **OR** ondansetron, acetaminophen **OR** acetaminophen, glucose, dextrose bolus **OR** dextrose bolus, glucagon (rDNA), dextrose    IMPRESSION/RECOMMENDATIONS:      1. End-stage renal disease with Intradialytic Hypotension  PLAN:  1. IHD on  Monday Wednesday Friday -tolerated IHD this AM  2. Follow labs  3. Will continue to use cooled dialysate with each treatment    2. Hyperkalemia  K+ WNL 4.3 post IHD  PLAN:  1. Follow K+    3. Hypotension with   Sinus bradycardia   metoprolol and timolol eye drops discontinued-remains bradycardic-concern for autonomic dysfunction-now off  pressor-Norepi  PLAN:  1. Continue to monitor-pressor off    4. Anemia of chronic kidney disease with Thrombocytopenia and Leukopenia  HgB 11.7-->8.9 -->8.2 -->10.5-->9.0 -->8. 6with decrease in -->79-->67 -->100-->59-->557  PLAN:  1. Continue JAYSHREE   2. Follow H/H    5. Secondary hyperparathyroidism of renal origin with Hyperphosphatemia  PO4 now WNL  PLAN:  1. Follow Ca++ and PO4  2.  Hold PO4 binder      Greer Potter MD  7/18/2022 2:57 PM

## 2022-07-18 NOTE — PROGRESS NOTES
[facility]  Progress Note    Chief complaint :  Chief Complaint   Patient presents with    Hypotension     given 2 percocet and 3 doses of midodrine     Pt PMH ESRD, T2DM, CAD, HFrEF, chronic vertebral osteomyelitis admitted for hypotension on 7/11/22. Today ICU day #8. Subjective:  No overnight problems. Patient describes feeling \"good. \" Patient reports abdominal pain slightly improved from yesterday, hasn't had BM in three days. Would like laxative. Reports still hallucinating but \"not as bad,\" she's aware the man she sees in her room is not real. Pt documentation notes confusion at baseline. Past medical, surgical, family and social history were reviewed, non-contributory, and unchanged unless otherwise stated. Review of Systems   Respiratory:  Negative for shortness of breath. Cardiovascular:  Negative for chest pain. Gastrointestinal:  Positive for abdominal pain and constipation. Negative for abdominal distention. Reports last BM three days ago. Genitourinary:  Negative for dysuria, hematuria and urgency. Musculoskeletal:  Positive for back pain. Objective:  BP (!) 158/75   Pulse 65   Temp 98 °F (36.7 °C) (Axillary)   Resp 10   Ht 5' 10\" (1.778 m)   Wt 178 lb 5.6 oz (80.9 kg)   SpO2 100%   BMI 25.59 kg/m²     Pt has the following: central line (R femoral since 7/11/22), AV fistula on L arm, BP cuff placed on R upper arm. Physical Exam  Cardiovascular:      Rate and Rhythm: Normal rate and regular rhythm. Pulmonary:      Effort: Pulmonary effort is normal.      Breath sounds: Normal breath sounds. No wheezing, rhonchi or rales. Abdominal:      General: There is no distension. Tenderness: There is abdominal tenderness. There is no guarding or rebound. Musculoskeletal:      Right lower leg: Edema present. Left lower leg: Edema present.       Comments: Edema present in upper extremities, third digit L hand amputated Neurological:      Mental Status: She is alert. Psychiatric:         Mood and Affect: Mood normal.         Thought Content:  Thought content normal.     Labs:  Recent Results (from the past 24 hour(s))   POCT Glucose    Collection Time: 07/17/22  9:13 AM   Result Value Ref Range    Meter Glucose 72 (L) 74 - 99 mg/dL   POCT Glucose    Collection Time: 07/17/22 11:13 AM   Result Value Ref Range    Meter Glucose 84 74 - 99 mg/dL   POCT Glucose    Collection Time: 07/17/22  4:09 PM   Result Value Ref Range    Meter Glucose 84 74 - 99 mg/dL   POCT Glucose    Collection Time: 07/17/22 10:07 PM   Result Value Ref Range    Meter Glucose 67 (L) 74 - 99 mg/dL   CBC with Auto Differential    Collection Time: 07/18/22  5:00 AM   Result Value Ref Range    WBC 3.8 (L) 4.5 - 11.5 E9/L    RBC 3.08 (L) 3.50 - 5.50 E12/L    Hemoglobin 8.6 (L) 11.5 - 15.5 g/dL    Hematocrit 27.4 (L) 34.0 - 48.0 %    MCV 89.0 80.0 - 99.9 fL    MCH 27.9 26.0 - 35.0 pg    MCHC 31.4 (L) 32.0 - 34.5 %    RDW 17.3 (H) 11.5 - 15.0 fL    Platelets 57 (L) 118 - 450 E9/L    MPV NOT CALC 7.0 - 12.0 fL    Neutrophils % 64.3 43.0 - 80.0 %    Immature Granulocytes % 0.5 0.0 - 5.0 %    Lymphocytes % 23.2 20.0 - 42.0 %    Monocytes % 9.4 2.0 - 12.0 %    Eosinophils % 2.3 0.0 - 6.0 %    Basophils % 0.3 0.0 - 2.0 %    Neutrophils Absolute 2.46 1.80 - 7.30 E9/L    Immature Granulocytes # 0.02 E9/L    Lymphocytes Absolute 0.89 (L) 1.50 - 4.00 E9/L    Monocytes Absolute 0.36 0.10 - 0.95 E9/L    Eosinophils Absolute 0.09 0.05 - 0.50 E9/L    Basophils Absolute 0.01 0.00 - 0.20 E9/L   Phosphorus    Collection Time: 07/18/22  5:00 AM   Result Value Ref Range    Phosphorus 3.9 2.5 - 4.5 mg/dL   Magnesium    Collection Time: 07/18/22  5:00 AM   Result Value Ref Range    Magnesium 1.7 1.6 - 2.6 mg/dL   Comprehensive Metabolic Panel    Collection Time: 07/18/22  5:00 AM   Result Value Ref Range    Sodium 135 132 - 146 mmol/L    Potassium 4.3 3.5 - 5.0 mmol/L    Chloride 104 98 - 107 mmol/L    CO2 22 22 - 29 mmol/L    Anion Gap 9 7 - 16 mmol/L    Glucose 80 74 - 99 mg/dL    BUN 20 6 - 23 mg/dL    CREATININE 3.4 (H) 0.5 - 1.0 mg/dL    GFR Non-African American 16 >=60 mL/min/1.73    GFR African American 16     Calcium 9.0 8.6 - 10.2 mg/dL    Total Protein 4.9 (L) 6.4 - 8.3 g/dL    Albumin 2.6 (L) 3.5 - 5.2 g/dL    Total Bilirubin 0.4 0.0 - 1.2 mg/dL    Alkaline Phosphatase 155 (H) 35 - 104 U/L    ALT 54 (H) 0 - 32 U/L    AST 52 (H) 0 - 31 U/L   Platelet Confirmation    Collection Time: 07/18/22  5:00 AM   Result Value Ref Range    Platelet Confirmation CONFIRMED        Radiology and other tests reviewed:  CT ABDOMEN PELVIS W WO CONTRAST Additional Contrast? Oral   Final Result   Stable lumbar spine endplate erosions. This may represent dialysis related   spondyloarthropathy. MRI lumbar spine would better differentiate from   infectious discitis-osteomyelitis, if clinically indicated. Trace pleural effusions, new from prior. Anasarca. Decreased bladder wall thickening; correlate with urinalysis to evaluate for   UTI. No evidence of pyelonephritis. See other incidental findings above. XR CHEST PORTABLE   Final Result   No acute process. Assessment:  Active Hospital Problems    Diagnosis Date Noted    Atherosclerosis of native coronary artery of native heart without angina pectoris [I25.10]      Priority: High    Hypotension [I95.9] 07/11/2022     Priority: Medium    Vertebral osteomyelitis, chronic (HCC) [M46.20] 07/11/2022     Priority: Medium    Symptomatic sinus bradycardia [R00.1]      Priority: Medium    ESRD (end stage renal disease) on dialysis ([facility] [address] [N18.6, Z99.2]     Mixed hyperlipidemia [E78.2]     Chronic [facility]est[facility] [I50.32] 09/23/2014       Plan:  Hypotension  /75 x2 at 4:00 & 5:00, down from 205/93 then 179/86 x5 starting at 21:00. Midodrine increased twice up to 20 mg now.  Dopamine drip 7/11/22-7/13/22. Started Levophed, d/c, restarted, now stopped again. Received albumin infusion 7/13/22. Rule out infection. Blood cultures negative, procal 0.42 (^), CRP 1.4.  - ICU & cardiology managing, appreciate input:     - Continue 75 cc/hr NS     - Continue midodrine 20 mg three times daily     - Levophed stopped (last dose 10:20am 7/17/22)     - Timolol eyedrops discontinued, Alphagan (brimonidine) instead BID  - Continue BP readings from R upper arm  - Consider order Dopper u/s of R arm     Hypoglycemia  Pt given 5 units of Lantus (glargine) & 1 unit Humalog (lispro) 7/16/22 at 20:45. BG 40s (7/17/22) but responded to glucose gel and Jell-O. POCT BG 67 (7/17/22), CMP BG 80 (7/18/22). - Continue POCT bg checks  - Consider hold insulin    Abdominal Pain  CT abdomen pelvis (7/17/22): Stable lumbar spine endplate erosions. This may represent dialysis related  spondyloarthropathy. MRI lumbar spine would better differentiate from infectious discitis-osteomyelitis, if clinically indicated. Trace pleural effusions, new from prior. Anasarca. Decreased bladder wall thickening; correlate with urinalysis to evaluate for UTI. No evidence of pyelonephritis. - Protonix 40 mg    Bradycardia  - Cardiology following, appreciate input      - Hold metoprolol     - Will assess for chronotropic incompetence when able     ESRD  Pt able to make urine. Daily +1,782.7. I/Os 4.3 K+ (7/18/22) . HD held 7/15/22 due to hypotension.  - Nephrology following, appreciate input     - Dialysis this morning (MWF)  - I/Os    HFrEF  KIARA (4/19/22) showed mildly reduced LV function. EF 40-45%. - Cardiology following, appreciate input:     - Hold home Imdur 30 mg daily      - Hold home Bumex 2 mg Sun-Tue- Thur     - Hold home metoprolol 25 mg daily    CAD  S/p stent placement w/ balloon angioplasty (5/2022). (EKG 7/11/22) showed sinus bradycardia with first degree AV block compared to previous.    - Continue home ASA 81 mg daily  - Continue home ticagrelor (Brilinta) 90 mg BID    Chronic Vertebral Osteomyelitis  L1-L2. On IV vanc for 6 weeks now on suppressive doxycycline.   - Continue vancomycin 1000 mg three times per week, received on 7/13/22 & 7/16/22, next after HD 7/18/22  - Continue doxycycline 100 mg BID, day #8    Hyperuricemia  - Continue home allopurinol 100 mg BID    HLD  - Continue home Lipitor 80 mg nightly    T2DM  With neuropathy. - Insulin-dependent  - 5 u Lantus (insulin glargine) nightly  - Humalog (insulin lispro) nightly & with meals  - Gabapentin 200 mg TID    Anemia of CKD  Hgb 8.6 (7/18/22), down from 9.0, down from 10.5. Thrombocytopenia (57) down from 59 down from 100, leukopenia (3.8)x2. NO BLOOD PRODUCTS ([employer]). - Nephrology following, appreciate input:     - Recheck H/H     - Continue JAYSHREE M/W/F, last dose 7/15/22  - Per crit care: All cell lines dropping again but nothing needs done. Secondary Hyperparathyroidism  Phosphorus 3.9  (7/18/22). - Nephrology following, appreciate input:     - Will check phosphorus and calcium      - Hold home Lanthanum (phosphate binder) 1000 mg three times weekly    Wound care following for R chest mediport. Pain Control:  Tylenol 650 mg Q6HR PRN for mild pain  DVT ppx:  heparin SQ , d/c 7/14/22   GI ppx: Protonix 40 mg daily  Code Status: Full  Diet: General diet, RD added ONS BID      Disposition: Discharge to [facility] (Non-Skilled).  [facility], DO   Family Medicine Resident PGY-1  07/18/22   7:55 AM

## 2022-07-18 NOTE — PROGRESS NOTES
[address]  Progress Note    Chief complaint :  Chief Complaint   Patient presents with    Hypotension     given 2 percocet and 3 doses of midodrine     Pt PMH ESRD, T2DM, CAD, HFrEF, chronic vertebral osteomyelitis admitted for hypotension on 7/11/22. After 8 days in ICU, patient moved to intermediate unit. Subjective:    No overnight problems. Patient describes feeling better after bowel movement. Pt still reports having hallucinations, last night that her \"dog was sleeping in bed with her. \" She is alert and oriented x3. Reports having hx of hallucinations. Patient denies. Still reports feeling full after only eating a few bites. Reports feeling bloated. In room patient attempting to eat eggs and odell. Pt denies abdominal pain today, noting that she thinks pain is coming from her back. Reports making \"a little\" urine yesterday. Past medical, surgical, family and social history were reviewed, non-contributory, and unchanged unless otherwise stated. Review of Systems   Constitutional:  Negative for chills. Respiratory:  Negative for cough and shortness of breath. Cardiovascular:  Negative for chest pain. Gastrointestinal:  Negative for abdominal pain, constipation, diarrhea and vomiting. Endocrine: Negative for polydipsia and polyuria. Psychiatric/Behavioral:  Positive for hallucinations. Negative for confusion. Objective:  BP (!) 146/81   Pulse 80   Temp 97 °F (36.1 °C) (Axillary)   Resp 10   Ht 5' 10\" (1.778 m)   Wt 175 lb 14.8 oz (79.8 kg)   SpO2 95%   BMI 25.24 kg/m²     Physical Exam  Cardiovascular:      Rate and Rhythm: Normal rate and regular rhythm. Pulmonary:      Breath sounds: No wheezing, rhonchi or rales. Abdominal:      General: Bowel sounds are normal. There is no distension. Tenderness: There is no guarding. Musculoskeletal:      Right lower leg: No edema. Left lower leg: No edema.       Comments: Upper extremity non pitting edema bilaterally  IV access upper R arm   Neurological:      Mental Status: She is alert.      No nasal cannula  R femoral central line removed  No PCDs on legs  No BP cuff present on arm    Labs:  Recent Results (from the past 24 hour(s))   POCT Glucose    Collection Time: 07/17/22 10:07 PM   Result Value Ref Range    Meter Glucose 67 (L) 74 - 99 mg/dL   CBC with Auto Differential    Collection Time: 07/18/22  5:00 AM   Result Value Ref Range    WBC 3.8 (L) 4.5 - 11.5 E9/L    RBC 3.08 (L) 3.50 - 5.50 E12/L    Hemoglobin 8.6 (L) 11.5 - 15.5 g/dL    Hematocrit 27.4 (L) 34.0 - 48.0 %    MCV 89.0 80.0 - 99.9 fL    MCH 27.9 26.0 - 35.0 pg    MCHC 31.4 (L) 32.0 - 34.5 %    RDW 17.3 (H) 11.5 - 15.0 fL    Platelets 57 (L) 642 - 450 E9/L    MPV NOT CALC 7.0 - 12.0 fL    Neutrophils % 64.3 43.0 - 80.0 %    Immature Granulocytes % 0.5 0.0 - 5.0 %    Lymphocytes % 23.2 20.0 - 42.0 %    Monocytes % 9.4 2.0 - 12.0 %    Eosinophils % 2.3 0.0 - 6.0 %    Basophils % 0.3 0.0 - 2.0 %    Neutrophils Absolute 2.46 1.80 - 7.30 E9/L    Immature Granulocytes # 0.02 E9/L    Lymphocytes Absolute 0.89 (L) 1.50 - 4.00 E9/L    Monocytes Absolute 0.36 0.10 - 0.95 E9/L    Eosinophils Absolute 0.09 0.05 - 0.50 E9/L    Basophils Absolute 0.01 0.00 - 0.20 E9/L   Phosphorus    Collection Time: 07/18/22  5:00 AM   Result Value Ref Range    Phosphorus 3.9 2.5 - 4.5 mg/dL   Magnesium    Collection Time: 07/18/22  5:00 AM   Result Value Ref Range    Magnesium 1.7 1.6 - 2.6 mg/dL   Comprehensive Metabolic Panel    Collection Time: 07/18/22  5:00 AM   Result Value Ref Range    Sodium 135 132 - 146 mmol/L    Potassium 4.3 3.5 - 5.0 mmol/L    Chloride 104 98 - 107 mmol/L    CO2 22 22 - 29 mmol/L    Anion Gap 9 7 - 16 mmol/L    Glucose 80 74 - 99 mg/dL    BUN 20 6 - 23 mg/dL    CREATININE 3.4 (H) 0.5 - 1.0 mg/dL    GFR Non-African American 16 >=60 mL/min/1.73    GFR African American 16     Calcium 9.0 8.6 - 10.2 mg/dL    Total Protein 4.9 (L) 6.4 - 8.3 g/dL    Albumin 2.6 (L) 3.5 - 5.2 g/dL    Total Bilirubin 0.4 0.0 - 1.2 mg/dL    Alkaline Phosphatase 155 (H) 35 - 104 U/L    ALT 54 (H) 0 - 32 U/L    AST 52 (H) 0 - 31 U/L   Platelet Confirmation    Collection Time: 07/18/22  5:00 AM   Result Value Ref Range    Platelet Confirmation CONFIRMED    POCT Glucose    Collection Time: 07/18/22 11:12 AM   Result Value Ref Range    Meter Glucose 81 74 - 99 mg/dL   POCT Glucose    Collection Time: 07/18/22  5:04 PM   Result Value Ref Range    Meter Glucose 106 (H) 74 - 99 mg/dL       Radiology and other tests reviewed:  CT ABDOMEN PELVIS W WO CONTRAST Additional Contrast? Oral   Final Result   Stable lumbar spine endplate erosions. This may represent dialysis related   spondyloarthropathy. MRI lumbar spine would better differentiate from   infectious discitis-osteomyelitis, if clinically indicated. Trace pleural effusions, new from prior. Anasarca. Decreased bladder wall thickening; correlate with urinalysis to evaluate for   UTI. No evidence of pyelonephritis. See other incidental findings above. XR CHEST PORTABLE   Final Result   No acute process.          NM GASTRIC EMPTYING    (Results Pending)       Assessment:  Active Hospital Problems    Diagnosis Date Noted    Atherosclerosis of native coronary artery of native heart without angina pectoris [I25.10]      Priority: High    Hypotension [I95.9] 07/11/2022     Priority: Medium    Vertebral osteomyelitis, chronic (HCC) [M46.20] 07/11/2022     Priority: Medium    Symptomatic sinus bradycardia [R00.1]      Priority: Medium    Anemia, chronic disease [D63.8] 12/24/2012     Priority: Low    ESRD (end stage renal disease) on dialysis ([facility] [place] [address] [N18.6, Z99.2]     Mixed hyperlipidemia [E78.2]     Lymphedema of right upper extremity [I89.0]     Controlled type 2 diabetes mellitus with chronic kidney disease on chronic dialysis, with long-term current use of insulin ([facility] [place] [address] [E11.22, N18.6, Z79.4, Z99.2] 02/22/2017    Constipation [K59.00] 09/30/2014    Chronic diastolic [facility]est[facility] [I50.32] 09/23/2014       Plan:  Hypotension  /34 Stable off pressors. Midodrine increased twice up to 20 mg now. Dopamine drip 7/11/22-7/13/22. Started Levophed, d/c, restarted, now stopped again (last dose 10:20am 7/17/22). Received albumin infusion 7/13/22. Rule out infection. Blood cultures negative, procal 0.42 (^), CRP 1.4.   - ICU & cardiology managing, appreciate input:     - Continue midodrine 20 mg three times daily (received 1x 7/18/22)       - Stop IVF  - Continue BP readings from R upper arm  - Consider order Dopper u/s of R arm      Hypoglycemia  POCT BG 94 (7/19/22). - Continue POCT bg checks  - Continue to hold insulin Lantus   - Continue to hold insulin Humalog     Abdominal Pain  CT abdomen pelvis (7/17/22): Stable lumbar spine endplate erosions. This may represent dialysis related  spondyloarthropathy. MRI lumbar spine would better differentiate from infectious discitis-osteomyelitis, if clinically indicated. Trace pleural effusions, new from prior. Anasarca. Decreased bladder wall thickening; correlate with urinalysis to evaluate for UTI. No evidence of pyelonephritis. - Consider home Acidophilus lactobacillus capsules  - Continue Glycolax (polyethylene glycol) daily  - Continue Senokot nightly    Early Satiety  - Continue Protonix 40 mg before breakfast  - Consider emptying study   - Consider continuing home mirtazapine (Remeron) 7.5 mg nightly   - Consider restart home prochlorperazine (Compazine) 5 mg PRN Q6hr      Bradycardia  - Cardiology following, appreciate input     - Hold home metoprolol     - Will assess for chronotropic incompetence when able     ESRD  Pt able to make some urine. Daily -2,160 I/Os 4.3 K+ (7/18/22) . Next HD 7/20/22.  - Nephrology following, appreciate input     - Dialysis (MWF)  - I/Os     HFrEF  KIARA (4/19/22) showed mildly reduced LV function.  EF 40-45%. - Cardiology following, appreciate input:     - Hold home Imdur 30 mg daily     - Hold home Bumex 2 mg Sun-Tue- Thur     - Hold home metoprolol 25 mg daily     CAD  S/p stent placement w/ balloon angioplasty (5/2022). (EKG 7/11/22) showed sinus bradycardia with first degree AV block compared to previous. - Continue home ASA 81 mg daily  - Continue home ticagrelor (Brilinta) 90 mg BID     Chronic Vertebral Osteomyelitis  L1-L2. On IV vanc for 6 weeks now on suppressive doxycycline.  - Continue vancomycin 1000 mg three times per week, dose #3 (7/18/22)  - Continue doxycycline 100 mg BID, day #9     Hyperuricemia  - Continue home allopurinol 100 mg BID     HLD  - Continue home Lipitor 80 mg nightly     T2DM  With neuropathy. - Insulin-dependent  - Hold 5 u Lantus (insulin glargine) nightly   - Hold Humalog (insulin lispro) nightly & with meals   - Gabapentin 200 mg TID     Anemia of CKD  Hgb 8.1 (7/19/22), down from 8.6. Thrombocytopenia (57) x2, leukopenia (3.2) down from 3.8. NO BLOOD PRODUCTS ([employer]). - Nephrology following, appreciate input:     - Recheck H/H     - Continue JAYSHREE M/W/F, last dose 7/18/22  - Per crit care: All cell lines dropping again but nothing needs done. Secondary Hyperparathyroidism  Phosphorus 3.9  (7/18/22).   - Nephrology following, appreciate input:     - Will check phosphorus and calcium     - Hold home Lanthanum (phosphate binder) 1000 mg three times weekly    Glaucoma  - Timolol eyedrops discontinued, Alphagan (brimonidine) instead BID  - latanoprost (Xalatan) eyedrops daily      Pain Control:  Tylenol 650 mg Q6HR PRN for mild pain  DVT ppx: PCDs  GI ppx: Protonix, Glycolax, Senokot daily  Code Status: Full  Diet: General diet, RD added ONS BID      Disposition: Discharge to SNF, patient [facility] [place] [id number], [facility] Resident PGY-1  07/18/22   7:32 PM

## 2022-07-18 NOTE — PROCEDURES
Midline   Catheter insertion date: 7/18/2022     Product Number:  [id number]   Lot No: [id number]   Gauge: 4.5 fr   Lumen: single   Rt Brachial    Vein Diameter: 0.41 cm   Arm circumference at insertion site: 36 cm   Catheter Length: 15 cm   Internal Length: 13 cm   External Catheter Length: 2 cm   Ultrasound Used: yes  : Elif Garcia RN

## 2022-07-18 NOTE — PROGRESS NOTES
Critical Care Team - Daily Progress Note         Date and time: 7/18/2022 2:23 PM  Patient's name:  Yohannes York  Medical Record Number: [id number]  Patient's account/billing number: [de-identified]  Patient's YOB: 1955  Age: 78 y.o. Date of Admission: 7/11/2022 12:11 AM  Length of stay during current admission: 7      Primary Care Physician: Noemi Hyman MD  ICU Attending Physician: Dr. Yury Lundy    Code Status: Full Code    Reason for ICU admission: hypotension      SUBJECTIVE:     OVERNIGHT EVENTS:      7/18/2022: Patient BP remains stable off pressor, while monitoring on right upper arm, will recommend this site be used to monitor blood pressure while on the floors as well. Patient for dialysis today, no acute findings on CT Abd and pelvis. Patient awake and oriented, no acute distress at this time. 7/17/2022: Patient with HR in 60's, Levo restarted for hypotension after initially being able to wean, HgB 9.0, HD tomorrow, CT Abdomen and pelvis ordered. 7/16: Multiple complaints, nausea, headache and dyspnea. Hypothermic overnight. Norepi at 6.     7/15/2022: Off levophed, pressure with systolic > 90, HR remains in the 50's. No acute overnight events   7/14/2022: Still mildly hypotensive and bradycardiac with an inability to completely wean norepi. Mildly dizzy and nauseated overnight.       CURRENT VENTILATION STATUS:     [] Ventilator  [] BIPAP  [x] Nasal Cannula [] Room Air      IF INTUBATED, ET TUBE MARKING AT LOWER LIP:       cms    SECRETIONS Amount:  [] Small [] Moderate  [] Large  [x] None  Color:     [] White [] Colored  [] Bloody    SEDATION:  RAAS Score:  [] Propofol gtt  [] Versed gtt  [] Ativan gtt   [x] No Sedation    PARALYZED:  [x] No    [] Yes      VASOPRESSORS:  [] No    [x] Yes    If yes -   [x] Levophed       [] Dopamine     [] Vasopressin       [] Dobutamine  [] Phenylephrine         [] Epinephrine    CENTRAL LINES:     [] No   [] Yes   (Date of Insertion: 7/12 )           If yes -     [] Right IJ     [] Left IJ [x] Right Femoral [] Left Femoral                   [] Right Subclavian [] Left Subclavian       SOTOMAYOR'S CATHETER:   [x] No   [] Yes  (Date of Insertion:   )     URINE OUTPUT:            [] Good   [] Low              [x] Anuric      OBJECTIVE:     VITAL SIGNS:  BP (!) 159/71   Pulse 71   Temp (!) 96 °F (35.6 °C)   Resp 14   Ht 5' 10\" (1.778 m)   Wt 175 lb 14.8 oz (79.8 kg)   SpO2 96%   BMI 25.24 kg/m²   Tmax over 24 hours:  Temp (24hrs), Av.8 °F (36 °C), Min:96 °F (35.6 °C), Max:98 °F (36.7 °C)      Patient Vitals for the past 6 hrs:   BP Temp Pulse Resp SpO2 Weight   22 1300 (!) 159/71 -- 71 14 96 % --   22 1230 (!) 159/71 (!) 96 °F (35.6 °C) 70 14 -- 175 lb 14.8 oz (79.8 kg)   22 1216 (!) 105/55 (!) 96 °F (35.6 °C) 69 -- -- --   22 1200 (!) 108/55 -- 67 -- -- --   22 1145 (!) 106/58 -- 69 -- -- --   22 1130 (!) 98/53 -- 68 -- -- --   22 1115 (!) 107/54 -- 67 -- -- --   22 1100 (!) 107/54 -- 71 13 100 % --   22 1045 (!) 107/56 -- 71 -- -- --   22 1030 121/63 -- 69 -- -- --   22 1015 119/60 -- 68 -- -- --   22 1000 118/65 -- 67 11 97 % --   22 0945 (!) 105/59 -- 67 -- -- --   22 0930 (!) 101/50 -- 65 -- -- --   22 0915 (!) 111/59 -- 67 -- -- --   22 0900 115/61 -- 68 11 98 % --   22 0846 97/75 (!) 96.4 °F (35.8 °C) 66 -- -- --   22 0831 97/75 (!) 96 °F (35.6 °C) -- 12 -- 178 lb 5.6 oz (80.9 kg)           Intake/Output Summary (Last 24 hours) at 2022 1423  Last data filed at 2022 1230  Gross per 24 hour   Intake 1397.75 ml   Output 2210 ml   Net -812.25 ml       Wt Readings from Last 2 Encounters:   22 175 lb 14.8 oz (79.8 kg)   22 151 lb (68.5 kg)     Body mass index is 25.24 kg/m².         PHYSICAL EXAMINATION:    General appearance - awake, in no acute distress  Mental status - alert, oriented to person, place, and time  Eyes - pupils equal and reactive, extraocular eye movements intact  Ears - right ear normal, left ear normal  Nose - normal and patent, no erythema, discharge or polyps  Mouth - mucous membranes moist, pharynx normal without lesions  Neck - supple, no significant adenopathy  Chest - clear to auscultation, no wheezes, rales or rhonchi, symmetric air entry  Heart - bradycardiac, normal rhythm  Abdomen - soft, minimally tender, nondistended, no masses or organomegaly  Neurological - lethargic, oriented, normal speech, no focal findings or movement disorder noted}  Extremities - no edema, right femoral site clean with no further bleeding  Skin - no rashes     Any additional physical findings:    MEDICATIONS:    Scheduled Meds:   polyethylene glycol  17 g Oral Daily    senna  1 tablet Oral Nightly    midodrine  20 mg Oral TID WC    white petrolatum   Topical BID    epoetin fani-epbx  3,000 Units SubCUTAneous Once per day on Mon Wed Fri    pantoprazole  40 mg Oral QAM AC    latanoprost  1 drop Both Eyes Daily    brimonidine  1 drop Right Eye BID    vancomycin  1,000 mg IntraVENous Q MWF    allopurinol  100 mg Oral Daily    atorvastatin  80 mg Oral Nightly    doxycycline hyclate  100 mg Oral 2 times per day    gabapentin  200 mg Oral TID    ticagrelor  90 mg Oral BID    sodium chloride flush  5-40 mL IntraVENous 2 times per day    aspirin  81 mg Oral Daily    insulin lispro  0-6 Units SubCUTAneous TID WC    insulin lispro  0-3 Units SubCUTAneous Nightly    lanthanum  1,000 mg Oral TID      Continuous Infusions:   sodium chloride      dextrose       PRN Meds:   sodium chloride flush, 5-40 mL, PRN  sodium chloride, , PRN  ondansetron, 4 mg, Q8H PRN   Or  ondansetron, 4 mg, Q6H PRN  acetaminophen, 650 mg, Q6H PRN   Or  acetaminophen, 650 mg, Q6H PRN  glucose, 4 tablet, PRN  dextrose bolus, 125 mL, PRN   Or  dextrose bolus, 250 mL, PRN  glucagon (rDNA), 1 mg, PRN  dextrose, 100 mL/hr, PRN        VENT SETTINGS (Comprehensive) (if applicable): Additional Respiratory Assessments  Heart Rate: 71  Resp: 14  SpO2: 96 %    ABGs:   Recent Labs     07/16/22  1043   HCO3 21.5*   BE -5.0*   O2SAT 92.8         Laboratory findings:    Complete Blood Count:   Recent Labs     07/16/22 0528 07/17/22  0515 07/18/22  0500   WBC 7.3 3.8* 3.8*   HGB 10.5* 9.0* 8.6*   HCT 33.4* 29.3* 27.4*   * 59* 57*          Last 3 Blood Glucose:   Recent Labs     07/16/22 0528 07/17/22  0515 07/18/22  0500   GLUCOSE 116* 46* 80          PT/INR:    Lab Results   Component Value Date/Time    PROTIME 12.3 06/15/2022 07:14 PM    PROTIME 10.4 02/15/2012 10:40 AM    INR 1.1 06/15/2022 07:14 PM     PTT:    Lab Results   Component Value Date/Time    APTT 98.6 05/05/2022 06:30 AM       Comprehensive Metabolic Profile:   Recent Labs     07/16/22 0528 07/17/22  0515 07/18/22  0500    140 135   K 4.6 4.0 4.3    106 104   CO2 18* 24 22   BUN 31* 19 20   CREATININE 4.3* 3.0* 3.4*   GLUCOSE 116* 46* 80   CALCIUM 9.5 9.0 9.0   PROT 6.1* 5.1* 4.9*   LABALBU 3.6 2.7* 2.6*   BILITOT 0.4 0.4 0.4   ALKPHOS 199* 166* 155*   AST 75* 56* 52*   ALT 78* 59* 54*        Magnesium:   Lab Results   Component Value Date/Time    MG 1.7 07/18/2022 05:00 AM     Phosphorus:   Lab Results   Component Value Date/Time    PHOS 3.9 07/18/2022 05:00 AM     Ionized Calcium:   Lab Results   Component Value Date/Time    CAION 1.22 11/08/2017 04:28 AM        Urinalysis:     Troponin: No results for input(s): TROPONINI in the last 72 hours.     Microbiology:    Cultures during this admission:     Blood cultures:                 [] None drawn      [x] Negative             []  Positive (Details:  )  Urine Culture:                   [x] None drawn      [] Negative             []  Positive (Details:  )  Sputum Culture:               [x] None drawn       [] Negative             []  Positive (Details:  )   Endotracheal aspirate:     [x] None drawn       [] Negative             []  Positive (Details: )     Other pertinent Labs:       Radiology/Imaging:              ASSESSMENT:     Principal Problem:    Hypotension  Active Problems:    Atherosclerosis of native coronary artery of native heart without angina pectoris    Vertebral osteomyelitis, chronic ([facility])    Symptomatic sinus bradycardia    Anemia, chronic disease    Constipation    Chronic diastolic CHF (congestive heart failure) ([facility])    Controlled type 2 diabetes mellitus with chronic kidney disease on chronic dialysis, with long-term current use of insulin ([facility])    ESRD (end stage renal disease) on dialysis ([facility]    Mixed hyperlipidemia    Lymphedema of right upper extremity  Resolved Problems:    * No resolved hospital problems. *      Additional assessment:    Mildly acidemic mixed met and resp but mild so see response to dialysis. Persistent abdominal pain ct abd ordered as d/w FM Staff yesterday  Hypoglycemia        PLAN:     WEAN PER PROTOCOL:  [] No   [] Yes  [x] N/A    DISCONTINUE ANY LABS:   [x] No   [] Yes    ICU PROPHYLAXIS:  Stress ulcer:  [x] PPI Agent  [] [id number] [] Sucralfate  [] Other:  VTE:   [] Enoxaparin  [] Unfract. Heparin Subcut  [x] EPC Cuffs    NUTRITION:  [] NPO [] Tube Feeding (Specify: ) [] TPN  [x] PO (Diet: ADULT ORAL NUTRITION SUPPLEMENT; Breakfast, Dinner; Renal Oral Supplement  ADULT DIET; Regular)    HOME MEDICATIONS RECONCILED: [] No  [x] Yes    INSULIN DRIP:   [x] No   [] Yes    CONSULTATION NEEDED:  [x] No   [] Yes    FAMILY UPDATED:    [x] No   [] Yes    TRANSFER OUT OF ICU:   [] No   [x] Yes    ADDITIONAL PLAN:  On midodrine 20 mg   HD today  Nephrology following  Trend HGB, pt is a Religious   SO NO BLOOD TRANSFUSION ANYWAY  BP stable in right upper extremity while off all pressors, will recommend that BP be taken at this location  D/C Lantus  PT/OT  Pull Fem Line  Okay to transfer to [facility]. Kristen Flores,     I personally saw, examined and provided care for the patient.  Radiographs, labs and medication list were reviewed by me independently. I spoke with bedside nursing, therapists and consultants. Critical care services and times documented are independent of procedures and multidisciplinary rounds with Residents. Additionally comprehensive, multidisciplinary rounds were conducted with the MICU team. The case was discussed in detail and plans for care were established. Review of Residents documentation was conducted and revisions were made as appropriate.  I agree with the above documented exam, problem list and plan of care with the following additions:    BP stable  Off vasopressors x 24 hours  Stop IVF  PT/OT  May transfer    Suma Fajardo MD

## 2022-07-19 LAB
ALBUMIN SERPL-MCNC: 2.7 G/DL (ref 3.5–5.2)
ALP BLD-CCNC: 143 U/L (ref 35–104)
ALT SERPL-CCNC: 46 U/L (ref 0–32)
ANION GAP SERPL CALCULATED.3IONS-SCNC: 11 MMOL/L (ref 7–16)
AST SERPL-CCNC: 43 U/L (ref 0–31)
BASOPHILS ABSOLUTE: 0.01 E9/L (ref 0–0.2)
BASOPHILS RELATIVE PERCENT: 0.3 % (ref 0–2)
BILIRUB SERPL-MCNC: 0.4 MG/DL (ref 0–1.2)
BUN BLDV-MCNC: 13 MG/DL (ref 6–23)
CALCIUM SERPL-MCNC: 9.1 MG/DL (ref 8.6–10.2)
CHLORIDE BLD-SCNC: 104 MMOL/L (ref 98–107)
CO2: 24 MMOL/L (ref 22–29)
CREAT SERPL-MCNC: 2.5 MG/DL (ref 0.5–1)
EOSINOPHILS ABSOLUTE: 0.08 E9/L (ref 0.05–0.5)
EOSINOPHILS RELATIVE PERCENT: 2.5 % (ref 0–6)
FOLATE: >20 NG/ML (ref 4.8–24.2)
GFR AFRICAN AMERICAN: 23
GFR NON-AFRICAN AMERICAN: 23 ML/MIN/1.73
GLUCOSE BLD-MCNC: 97 MG/DL (ref 74–99)
HBV SURFACE AB TITR SER: REACTIVE {TITER}
HCT VFR BLD CALC: 25.7 % (ref 34–48)
HEMOGLOBIN: 8.1 G/DL (ref 11.5–15.5)
HEPATITIS B SURFACE ANTIGEN INTERPRETATION: NORMAL
IMMATURE GRANULOCYTES #: 0.01 E9/L
IMMATURE GRANULOCYTES %: 0.3 % (ref 0–5)
LYMPHOCYTES ABSOLUTE: 0.83 E9/L (ref 1.5–4)
LYMPHOCYTES RELATIVE PERCENT: 25.7 % (ref 20–42)
MAGNESIUM: 1.8 MG/DL (ref 1.6–2.6)
MCH RBC QN AUTO: 27.8 PG (ref 26–35)
MCHC RBC AUTO-ENTMCNC: 31.5 % (ref 32–34.5)
MCV RBC AUTO: 88.3 FL (ref 80–99.9)
METER GLUCOSE: 89 MG/DL (ref 74–99)
METER GLUCOSE: 94 MG/DL (ref 74–99)
METER GLUCOSE: 94 MG/DL (ref 74–99)
METER GLUCOSE: 96 MG/DL (ref 74–99)
MONOCYTES ABSOLUTE: 0.41 E9/L (ref 0.1–0.95)
MONOCYTES RELATIVE PERCENT: 12.7 % (ref 2–12)
NEUTROPHILS ABSOLUTE: 1.89 E9/L (ref 1.8–7.3)
NEUTROPHILS RELATIVE PERCENT: 58.5 % (ref 43–80)
PDW BLD-RTO: 17.2 FL (ref 11.5–15)
PHOSPHORUS: 3.2 MG/DL (ref 2.5–4.5)
PLATELET # BLD: 57 E9/L (ref 130–450)
PLATELET CONFIRMATION: NORMAL
PMV BLD AUTO: ABNORMAL FL (ref 7–12)
POTASSIUM SERPL-SCNC: 4.1 MMOL/L (ref 3.5–5)
RBC # BLD: 2.91 E12/L (ref 3.5–5.5)
SODIUM BLD-SCNC: 139 MMOL/L (ref 132–146)
TOTAL PROTEIN: 4.8 G/DL (ref 6.4–8.3)
VITAMIN B-12: >2000 PG/ML (ref 211–946)
WBC # BLD: 3.2 E9/L (ref 4.5–11.5)

## 2022-07-19 PROCEDURE — 85025 COMPLETE CBC W/AUTO DIFF WBC: CPT

## 2022-07-19 PROCEDURE — 2060000000 HC ICU INTERMEDIATE R&B

## 2022-07-19 PROCEDURE — 6360000002 HC RX W HCPCS: Performed by: INTERNAL MEDICINE

## 2022-07-19 PROCEDURE — 36415 COLL VENOUS BLD VENIPUNCTURE: CPT

## 2022-07-19 PROCEDURE — 82607 VITAMIN B-12: CPT

## 2022-07-19 PROCEDURE — 84100 ASSAY OF PHOSPHORUS: CPT

## 2022-07-19 PROCEDURE — 6370000000 HC RX 637 (ALT 250 FOR IP): Performed by: INTERNAL MEDICINE

## 2022-07-19 PROCEDURE — 97165 OT EVAL LOW COMPLEX 30 MIN: CPT

## 2022-07-19 PROCEDURE — 2580000003 HC RX 258: Performed by: INTERNAL MEDICINE

## 2022-07-19 PROCEDURE — 80053 COMPREHEN METABOLIC PANEL: CPT

## 2022-07-19 PROCEDURE — 6370000000 HC RX 637 (ALT 250 FOR IP)

## 2022-07-19 PROCEDURE — 83735 ASSAY OF MAGNESIUM: CPT

## 2022-07-19 PROCEDURE — 99232 SBSQ HOSP IP/OBS MODERATE 35: CPT | Performed by: FAMILY MEDICINE

## 2022-07-19 PROCEDURE — 97535 SELF CARE MNGMENT TRAINING: CPT

## 2022-07-19 PROCEDURE — 82746 ASSAY OF FOLIC ACID SERUM: CPT

## 2022-07-19 PROCEDURE — 97161 PT EVAL LOW COMPLEX 20 MIN: CPT

## 2022-07-19 PROCEDURE — 82962 GLUCOSE BLOOD TEST: CPT

## 2022-07-19 RX ORDER — MIRTAZAPINE 15 MG/1
7.5 TABLET, FILM COATED ORAL NIGHTLY
Status: DISCONTINUED | OUTPATIENT
Start: 2022-07-19 | End: 2022-07-22 | Stop reason: HOSPADM

## 2022-07-19 RX ORDER — MIDODRINE HYDROCHLORIDE 5 MG/1
15 TABLET ORAL
Status: DISCONTINUED | OUTPATIENT
Start: 2022-07-19 | End: 2022-07-20

## 2022-07-19 RX ADMIN — SODIUM CHLORIDE, PRESERVATIVE FREE 100 UNITS: 5 INJECTION INTRAVENOUS at 19:53

## 2022-07-19 RX ADMIN — PETROLATUM: 420 OINTMENT TOPICAL at 19:53

## 2022-07-19 RX ADMIN — DOXYCYCLINE HYCLATE 100 MG: 100 CAPSULE ORAL at 19:52

## 2022-07-19 RX ADMIN — PANTOPRAZOLE SODIUM 40 MG: 40 TABLET, DELAYED RELEASE ORAL at 05:50

## 2022-07-19 RX ADMIN — ATORVASTATIN CALCIUM 80 MG: 40 TABLET, FILM COATED ORAL at 19:52

## 2022-07-19 RX ADMIN — MIDODRINE HYDROCHLORIDE 20 MG: 5 TABLET ORAL at 09:22

## 2022-07-19 RX ADMIN — PETROLATUM: 420 OINTMENT TOPICAL at 12:40

## 2022-07-19 RX ADMIN — Medication 10 ML: at 09:23

## 2022-07-19 RX ADMIN — ALLOPURINOL 100 MG: 100 TABLET ORAL at 09:23

## 2022-07-19 RX ADMIN — ACETAMINOPHEN 650 MG: 325 TABLET ORAL at 08:35

## 2022-07-19 RX ADMIN — MIDODRINE HYDROCHLORIDE 15 MG: 5 TABLET ORAL at 17:21

## 2022-07-19 RX ADMIN — BRIMONIDINE TARTRATE 1 DROP: 2 SOLUTION OPHTHALMIC at 09:23

## 2022-07-19 RX ADMIN — SODIUM CHLORIDE, PRESERVATIVE FREE 100 UNITS: 5 INJECTION INTRAVENOUS at 12:46

## 2022-07-19 RX ADMIN — Medication 10 ML: at 19:54

## 2022-07-19 RX ADMIN — SENNOSIDES 8.6 MG: 8.6 TABLET, FILM COATED ORAL at 19:52

## 2022-07-19 RX ADMIN — TICAGRELOR 90 MG: 90 TABLET ORAL at 09:22

## 2022-07-19 RX ADMIN — TICAGRELOR 90 MG: 90 TABLET ORAL at 19:52

## 2022-07-19 RX ADMIN — MIDODRINE HYDROCHLORIDE 20 MG: 5 TABLET ORAL at 12:40

## 2022-07-19 RX ADMIN — GABAPENTIN 200 MG: 100 CAPSULE ORAL at 09:22

## 2022-07-19 RX ADMIN — GABAPENTIN 200 MG: 100 CAPSULE ORAL at 13:13

## 2022-07-19 RX ADMIN — ASPIRIN 81 MG: 81 TABLET, COATED ORAL at 09:22

## 2022-07-19 RX ADMIN — LANTHANUM CARBONATE 1000 MG: 500 TABLET, CHEWABLE ORAL at 12:40

## 2022-07-19 RX ADMIN — MIRTAZAPINE 7.5 MG: 15 TABLET, FILM COATED ORAL at 19:52

## 2022-07-19 RX ADMIN — POLYETHYLENE GLYCOL 3350 17 G: 17 POWDER, FOR SOLUTION ORAL at 12:29

## 2022-07-19 RX ADMIN — LANTHANUM CARBONATE 1000 MG: 500 TABLET, CHEWABLE ORAL at 17:22

## 2022-07-19 RX ADMIN — DOXYCYCLINE HYCLATE 100 MG: 100 CAPSULE ORAL at 09:22

## 2022-07-19 RX ADMIN — GABAPENTIN 200 MG: 100 CAPSULE ORAL at 19:52

## 2022-07-19 ASSESSMENT — PAIN SCALES - GENERAL
PAINLEVEL_OUTOF10: 0
PAINLEVEL_OUTOF10: 6

## 2022-07-19 ASSESSMENT — ENCOUNTER SYMPTOMS
ABDOMINAL PAIN: 0
SHORTNESS OF BREATH: 0
CONSTIPATION: 0
DIARRHEA: 0
VOMITING: 0
COUGH: 0

## 2022-07-19 ASSESSMENT — PAIN DESCRIPTION - DESCRIPTORS: DESCRIPTORS: ACHING;DULL

## 2022-07-19 ASSESSMENT — PAIN DESCRIPTION - ORIENTATION: ORIENTATION: RIGHT

## 2022-07-19 ASSESSMENT — PAIN DESCRIPTION - LOCATION: LOCATION: KNEE;LEG

## 2022-07-19 NOTE — PLAN OF CARE
Problem: ABCDS Injury Assessment  Goal: Absence of physical injury  Outcome: Progressing Towards Goal     Problem: Skin/Tissue Integrity  Goal: Absence of new skin breakdown  Description: 1. Monitor for areas of redness and/or skin breakdown  2. Assess vascular access sites hourly  3. Every 4-6 hours minimum:  Change oxygen saturation probe site  4. Every 4-6 hours:  If on nasal continuous positive airway pressure, respiratory therapy assess nares and determine need for appliance change or resting period.   Outcome: Progressing Towards Goal     Problem: Safety - Adult  Goal: Free from fall injury  Outcome: Progressing Towards Goal

## 2022-07-19 NOTE — CARE COORDINATION
7/19/2022  Social Work Discharge Planning:Pt will return to [facility]. Need therapy evals but no need to wait for auth. [facility] and transport form are completed. Electronically signed by YRN Weinstein on 7/19/2022 at 9:55 AM    7/19/2022  Social Work Discharge Planning: Liaison will start precert today. Electronically signed by RYN Weinstein on 7/19/2022 at 11:02 AM

## 2022-07-19 NOTE — PLAN OF CARE
Patient's chart updated to reflect:      . - HF care plan, HF education points and HF discharge instructions.  -Orders: 2 gram sodium diet, daily weights, I/O.  -PCP and/or Cardiologist appointment to be scheduled within 7 days of hospital discharge.  -History of HF, not primary admission Dx.   Patient admitted for treatment of Hypotension     Pedro Hernandez, RN BSN  Heart Failure Navigator

## 2022-07-19 NOTE — DISCHARGE INSTRUCTIONS
***HEART FAILURE - CONGESTIVE HEART FAILURE***  DISCHARGE INSTRUCTIONS:  GUIDELINES TO FOLLOW AT BOBY/LTAC/SNF/ Assisted Living    Future Appointments   Date Time Provider Dev Arizmendi   8/2/2022  8:40 AM GOYO Owens - [facility]   8/11/2022 10:15 AM GOYO Seals - CNS [facility]          MEDICATIONS:  Please notify the doctor if patient is not able to take their medications or if medications are being held for any reasons (such as low blood pressure ect.)  Do not give the patient ibuprofen (Advil or Motrin), naproxen (Aleve) without talking to the doctor first. This could make their heart failure worse. WEIGHT MONITORING:   Weigh patient every day in the morning after they void (If patient is able to stand, please get a standing weight.)   Notify the doctor of a weight gain of 3 pounds or more in 1 day   OR  a total of 5 pounds or more in 1 week             DIET   Cardiac heart healthy diet:  Low sodium diet: no  more than 2,000mg (2 grams) of salt / sodium per day (which equals to a little less than  a teaspoon of salt)/ Cardiac Diet: Low saturated / low trans fat, no added salt, caffeine restricted    If patient is there for rehab and will be returning home in the near future; reinforce with the patient and the family to follow a low sodium diet (2,000 mg)- avoid using salt at the table, avoid / limit use of canned soups, processed / packaged foods, salted snacks, olives and pickles. Do not use a salt substitute without checking with the doctor. (Mrs. Matias Saint is safe to use).        NOTIFY THE DOCTOR THE FIRST DAY OF ONSET OF ANY OF THESE   SYMPTOMS:   Weight gain of 3 pounds or more in 1 day         OR 5 pounds or more in one week  More shortness of breath  More swelling in stomach, legs, ankles or feet  Feeling more tired, No energy  Dry hacky cough  Dizziness  More chest pain / discomfort  Hard time breathing laying down

## 2022-07-19 NOTE — PROGRESS NOTES
Physical Therapy  Facility/Department: [address] INTERNAL MEDICINE 2  Physical Therapy Initial Assessment    Name: Paris Gamboa  : 1955  MRN: [id number]  Date of Service: 2022      Patient Diagnosis(es): The encounter diagnosis was Hypotension, unspecified hypotension type. Past Medical History:  has a past medical history of Acute infection of bone ([address] Acute osteomyelitis of phalanx of left hand ([address] Anemia of chronic disease, Arthritis, Breast cancer ([address] CAD (coronary artery disease), Carpal tunnel syndrome, Chronic diastolic CHF (congestive heart failure) ([address] CKD (chronic kidney disease) stage 4, GFR 15-29 ml/min ([address] Diabetic retinopathy ([address] Glaucoma, Hemodialysis patient ([address] Hyperkalemia, diminished renal excretion, Hyperlipidemia, Hypertension, Hypoglycemia unawareness in type 1 diabetes mellitus ([address] Insulin dependent type 2 diabetes mellitus ([address] Neuropathy, Osteomyelitis due to secondary diabetes [facility]), Patient is [place], Refusal of blood product, Ventricular hypertrophy, Ventricular tachycardia ([address] and Vitreous hemorrhage ([address] Past Surgical History:  has a past surgical history that includes Cholecystectomy; amputation; Mastectomy; Cystoscopy; Cataract removal; Ankle surgery; other surgical history (2011); ECHO Compl W Dop Color Flow (2013); Echo Complete (2013); spinal cord decompression; other surgical history (insertion lumbar drain insertion); other surgical history (10/22/2015); other surgical history (2015); Tunneled venous catheter placement (11/15/2017); Dialysis fistula creation (Left, 2018); vitrectomy (Left, 04/10/2018); pr rpr retinal dtchmnt w/vitrectomy any meth (Left, 04/10/2018); pr av anast,up arm basilic vein transposit (Left, 05/15/2018); pr ligatn angioaccess av fistula (Left, 2018); Colonoscopy; Carpal tunnel release (Left, 2020); transesophageal echocardiogram (2021);  Cardiac catheterization (12/09/2021); Finger amputation (Left, 01/20/2022); bone biopsy (N/A, 04/18/2022); transesophageal echocardiogram (04/19/2022); Coronary angioplasty (05/05/2022); and Port Surgery (N/A, 6/27/2022). Evaluating Therapist: Wayne Breen PT    Room #:  3726/5310-D  Diagnosis:  Hypotension [I95.9]  Hypotension, unspecified hypotension type [I95.9]  PMHx/PSHx:  ESRD on dialysis, breast CA with R mastectomy  Precautions:  falls    Social:  Pt admitted from SNF. States she uses a w/c there but was working on walking with ww. Prior to BOBY was independent with walker and lived alone   Initial Evaluation  Date: 7/19/22 Treatment      Short Term/ Long Term   Goals   Was pt agreeable to Eval/treatment? yes     Does pt have pain? Back pain     Bed Mobility  Rolling: SBA  Supine to sit: SBA  Sit to supine: NT  Scooting: SBA  independenet   Transfers Sit to stand: mod assist  Stand to sit: mod assist  Min assist   Ambulation    3 feet with ww with min assist  25 feet with ww with CGA   Stair Negotiation  Ascended and descended  NT      LE strength     4-/5    4/5   balance      fair     AM-PAC Raw score               14/24         Pt is alert and Oriented   LE ROM: WFL  Edema: R UE  Endurance: fair  Chair alarm: yes     ASSESSMENT:    Pt displays functional ability as noted in the objective portion of this evaluation. Patient education  Pt educated on PT objectives    Patient response to education:   Pt verbalized understanding Pt demonstrated skill Pt requires further education in this area   yes           Comments:  Pt motivated for PT session. Fatigued with mobility. Pt's/ family goals   1.  To walk with her walker    Conditions Requiring Skilled Therapeutic Intervention:    [x]Decreased strength     []Decreased ROM  [x]Decreased functional mobility  [x]Decreased balance   [x]Decreased endurance   []Decreased posture  []Decreased sensation  []Decreased coordination   []Decreased vision  []Decreased safety awareness   []Increased pain       Patient and or family understand(s) diagnosis, prognosis, and plan of care. Prognosis is good for reaching above PT goals    PHYSICAL THERAPY PLAN OF CARE:    PT POC is established based on physician order and patient diagnosis     Referring provider/PT Order: Kenn Schneider MD/ PT eval and treat      Current Treatment Recommendations:     [x] Strengthening to improve independence with functional mobility   [] ROM to improve independence with functional mobility   [x] Balance Training to improve static/dynamic balance and to reduce fall risk  [x] Endurance Training to improve activity tolerance during functional mobility   [x] Transfer Training to improve safety and independence with all functional transfers   [x] Gait Training to improve gait mechanics, endurance and assess need for appropriate assistive device  [] Stair Training in preparation for safe discharge home and/or into the community   [] Positioning to prevent skin breakdown and contractures  [x] Safety and Education Training   [x] Patient/Caregiver Education   [] HEP  [] Other     PT long term treatment goals are located in above grid    Frequency of treatments: 2-5x/week x 5 days. Time in  830  Time out  0849      Evaluation Time includes thorough review of current medical information, gathering information on past medical history/social history and prior level of function, completion of standardized testing/informal observation of tasks, assessment of data and education on plan of care and goals.       CPT codes:  [x] Low Complexity PT evaluation 97644  [] Moderate Complexity PT evaluation 33321  [] High Complexity PT evaluation 55227  [] PT Re-evaluation 22134  [] Gait training 78118 minutes  [] Manual therapy 02977 minutes  [] Therapeutic activities 16783 minutes  [] Therapeutic exercises 12468 minutes  [] Neuromuscular reeducation 13718 minutes     [facility] PT [id number]

## 2022-07-19 NOTE — PROGRESS NOTES
Department of Internal Medicine  Nephrology Attending Progress Note        SUBJECTIVE:  Mrs Yoly Botello being followed for ESKD    22: Pt awake alert sitting up in chair, states she was able to stand this AM with PT    PMH   End-stage renal disease on dialysis   Hypertension  Hyperlipidemia  Type 2 diabetes with neuropathy, retinopathy  Anemia of chronic kidney disease (patient Jehovah witness)  Secondary hyperparathyroid disease of renal origin  History of breast cancer rt side  History of coronary disease status post heart cath with balloon angioplasty 22  Carpal tunnel syndrome with bilateral release  History of osteomyelitis/discitis  L1-L2 on IV Vanco for 6 weeks now on suppressive doxycycline  History of spinal fusion of L3/L4/L5  History of glaucoma    Physical Exam:    Vitals:    22 0835   BP: (!) 110/35   Pulse: 79   Resp: 18   Temp: 97.3 °F (36.3 °C)   SpO2: 97%   MAXIMUM TEMPERATURE OVER 24HRS:  Temp (24hrs), Av.2 °F (35.7 °C), Min:94.5 °F (34.7 °C), Max:97.4 °F (36.3 °C)    TEMPERATURE RANGE OVER 24HRS:   Temp  Av.2 °F (35.7 °C)  Min: 94.5 °F (34.7 °C)  Max: 97.4 °F (36.3 °C)  24HR RESPIRATORY RATE RANGE:  Resp  Av.6  Min: 10  Max: 20  24HR PULSE RANGE: Pulse  Av.8  Min: 67  Max: 86  CURRENT BLOOD PRESSURE:  BP: (!) 110/35    24HR BLOOD PRESSURE RANGE:  Systolic (85IIN), MCB:527 , Min:98 , XXJ:913   ; Diastolic (25ZIA), NFL:29, Min:34, Max:81    8HR BLOOD PRESSURE RANGE:  BP: (110-115)/(34-35)   24HR PULSE OXIMETRY RANGE:  SpO2  Av.4 %  Min: 93 %  Max: 100 %  24HR INTAKE/OUTPUT:      Intake/Output Summary (Last 24 hours) at 2022 1039  Last data filed at 2022 1230  Gross per 24 hour   Intake 20 ml   Output 2200 ml   Net -2180 ml     8HR INTAKE OUTPUT:  No intake/output data recorded.   VENT SETTINGS:       Additional Respiratory Assessments  Heart Rate: 79  Resp: 18  SpO2: 97 %      General: Awake Alert and appropriate  Neck: No JVD noted  Lungs: Diminished at the bases bilaterally. CV: Regular rate and rhythm. No rub  Abd: Soft, tender diffusely  nondistended. Active bowel sounds  Skin: Warm and dry. No rash on exposed extremities  Ext: 1+ RUE edema (h/o breast CA);  No BLE edema ; LUE AV fistula   Neuro: Awake alert and appropriate    DATA:    CBC with Differential:    Lab Results   Component Value Date/Time    WBC 3.2 07/19/2022 05:55 AM    RBC 2.91 07/19/2022 05:55 AM    HGB 8.1 07/19/2022 05:55 AM    HCT 25.7 07/19/2022 05:55 AM    PLT 57 07/19/2022 05:55 AM    MCV 88.3 07/19/2022 05:55 AM    MCH 27.8 07/19/2022 05:55 AM    MCHC 31.5 07/19/2022 05:55 AM    RDW 17.2 07/19/2022 05:55 AM    NRBC 1.8 07/15/2022 05:20 AM    SEGSPCT 70 03/12/2014 10:53 AM    BANDSPCT 1 02/18/2015 10:35 AM    LYMPHOPCT 25.7 07/19/2022 05:55 AM    PROMYELOPCT 1.8 02/18/2017 04:00 AM    MONOPCT 12.7 07/19/2022 05:55 AM    MYELOPCT 0.9 10/24/2017 10:45 AM    BASOPCT 0.3 07/19/2022 05:55 AM    MONOSABS 0.41 07/19/2022 05:55 AM    LYMPHSABS 0.83 07/19/2022 05:55 AM    EOSABS 0.08 07/19/2022 05:55 AM    BASOSABS 0.01 07/19/2022 05:55 AM     CMP:    Lab Results   Component Value Date/Time     07/19/2022 05:55 AM    K 4.1 07/19/2022 05:55 AM    K 4.7 07/11/2022 12:46 AM     07/19/2022 05:55 AM    CO2 24 07/19/2022 05:55 AM    BUN 13 07/19/2022 05:55 AM    CREATININE 2.5 07/19/2022 05:55 AM    GFRAA 23 07/19/2022 05:55 AM    LABGLOM 23 07/19/2022 05:55 AM    GLUCOSE 97 07/19/2022 05:55 AM    GLUCOSE 46 04/02/2012 11:00 AM    PROT 4.8 07/19/2022 05:55 AM    LABALBU 2.7 07/19/2022 05:55 AM    LABALBU 3.8 04/02/2012 11:00 AM    CALCIUM 9.1 07/19/2022 05:55 AM    BILITOT 0.4 07/19/2022 05:55 AM    ALKPHOS 143 07/19/2022 05:55 AM    AST 43 07/19/2022 05:55 AM    ALT 46 07/19/2022 05:55 AM            polyethylene glycol  17 g Oral Daily    senna  1 tablet Oral Nightly    sodium chloride flush  5-40 mL IntraVENous 2 times per day    heparin flush  1 mL IntraVENous

## 2022-07-19 NOTE — PROGRESS NOTES
Occupational Therapy  OCCUPATIONAL THERAPY INITIAL EVALUATION     [facility]        [id number]                                                  Patient Name: Julian Ward    MRN: [id number]    : 1955    Room: [address] [place] [address]      Evaluating OT: Cassandra Kenyon OTR/L [id number]      Referring Provider: Jeff Moore MD    Specific Provider Orders/Date: OT eval and treat 22      Diagnosis:  Hypotension [I95.9]  Hypotension, unspecified hypotension type [I95.9]      Pertinent Medical History: arthritis, CHF, diabetic retinopathy, glaucoma, hemodialysis pt, HTN, neuropathy, R great toe amputation, amputation of L third digit, distal phalanx       Precautions:  Fall Risk, alarm     Assessment of current deficits    [x] Functional mobility  [x]ADLs  [x] Strength               []Cognition    [x] Functional transfers   [x] IADLs         [x] Safety Awareness   [x]Endurance    [x] Fine Coordination              [x] Balance      [] Vision/perception   [x]Sensation     []Gross Motor Coordination  [] ROM  [] Delirium                   [] Motor Control     OT PLAN OF CARE   OT POC based on physician orders, patient diagnosis and results of clinical assessment    Frequency/Duration 2-4 days/wk for 2 weeks PRN   Specific OT Treatment Interventions to include:   * Instruction/training on adapted ADL techniques and AE recommendations to increase functional independence within precautions       * Training on energy conservation strategies, correct breathing pattern and techniques to improve independence/tolerance for self-care routine  * Functional transfer/mobility training/DME recommendations for increased independence, safety, and fall prevention  * Patient/Family education to increase follow through with safety techniques and functional independence  * Recommendation of environmental modifications for increased safety with functional transfers/mobility and ADLs  * Therapeutic exercise to improve motor endurance, ROM, and functional strength for ADLs/functional transfers  * Therapeutic activities to facilitate/challenge dynamic balance, stand tolerance for increased safety and independence with ADLs  * Therapeutic activities to facilitate gross/fine motor skills for increased independence with ADLs  * Neuro-muscular re-education: facilitation of righting/equilibrium reactions, midline orientation, scapular stability/mobility, normalization of muscle tone, and facilitation of volitional active controled movement  * Positioning to improve skin integrity, interaction with environment and functional independence        Recommended Adaptive Equipment: TBD     Home Living: Pt lives admitted from [place] [address]. Pt reports she has been using w/c for functional mobility but it working on walking with a wheeled walker in therapy. Assist to transfer into w/c with wheeled walker. Assist with ADLs. Pain Level: pt reported 7/10 back pain, RN notified and pt given pain medication at start of session; pt agreeable to therapy  Cognition: A&O: pt alert and oriented grossly; [facility] command follow demonstrated. Pt pleasant during session. Memory:  Fair+   Sequencing:  Fair+   Problem solving:  Fair+   Judgement/safety:  Fair+     Functional Assessment:  AM-PAC Daily Activity Raw Score: 15/24   Initial Eval Status  Date: 7/19/22 Treatment Status  Date: STGs = LTGs  Time frame: 10-14 days   Feeding Set up     Grooming Min A, seated  Decreased standing tolerance/balance noted  Sup   UB Dressing Min A  To doff/don gown, seated  Cues for technique  Sup   LB Dressing Min A  To doff/don socks, seated  Increased needed to complete and cues for cross over technique. Good use of gross over technique. Decreased fine motor control and dexterity noted.   Mod A  For simulated pants   SBA-with use of AD as appropriate/needed   Bathing Mod A  SBA -with use of AD as appropriate/needed Toileting Mod A  SBA    Bed Mobility  Supine to sit: SBA    Sup/I   Functional Transfers Mod A with wheeled walker  Sit to stand from EOB  Stand to sit to chair  Cues for hand placement and safe technique to maximize safety and independence with ADLs and functional tasks. Cues and assist for safe and controlled descent when sitting. SBA    Functional Mobility Min A with wheeled walker  Short distance from bed to chair  Cues and assist for safe wheeled walker management. Some unsteadiness noted. SBA -with device as needed to maximize independence with ADLs and functional task completion   Balance Sitting:     Static:  Sup (EOB)    Dynamic:SBA  Standing: Min A with wheeled walker  I for static/dynamic sitting balance to maximize independence with ADLs and functional task completion    SBA/Sup for standing balance to maximize independence with ADLs and functional task completion   Activity Tolerance Fair with light activity. Good with ADL activity. Pt will demonstrate good understanding of education provided on EC/WS techniques    Visual/  Perceptual Glasses: none at bedside                Additional long-term goal: Pt will increase functional independence to PLOF to allow pt to live in least restrictive environment. Hand Dominance R   AROM (PROM) Strength Additional Info:    RUE  Limited shoulder AROM noted. Pt reports that it is bone on bone. Distally WFL impaired Decreased  and FMC/dexterity noted during ADL tasks and functional bed mobility. Increased swelling noted. [facility] WFL Decreased  and FMC/dexterity noted during ADL tasks and functional bed mobility. Increased swelling noted. Hearing: WFL   Sensation:  No c/o numbness or tingling   Tone: WFL   Edema: BUE and BLE    Comments: Upon arrival patient lying in bed. At end of session, patient sitting in chair with call light and phone within reach, all lines and tubes intact, and alarm set.   Overall patient demonstrated decreased independence and safety during completion of ADL/functional transfer/mobility tasks. Pt would benefit from continued skilled OT to increase safety and independence with completion of ADL/IADL tasks for functional independence and quality of life. Treatment: OT treatment provided this date includes:   Instruction/training on safety and adapted techniques for completion of ADLs: Cues for UB and LB dressing techniques   Instruction/training on safe functional mobility/transfer techniques: Safe technique and hand and feet placement. Cues for posture in sitting and standing. Proper Positioning/Alignment    Rehab Potential: Good for established goals     Patient / Family Goal: none stated    Patient and/or family were instructed on functional diagnosis, prognosis/goals and OT plan of care. Demonstrated fair+ understanding. Eval Complexity: Low    Time In: 0823  Time Out: 0849  Treatment Time: 10    Min Units   OT Eval Low 97165  x  1   OT Eval Medium 40299      OT Eval High 49821      OT Re-Eval [id number]       Therapeutic Ex 12985       Therapeutic Activities 33965       ADL/Self Care 37045  10  1   Orthotic Management 49260       Manual 25645     Neuro Re-Ed 57461       Non-Billable Time          Evaluation Time additionally includes thorough review of current medical information, gathering information on past medical history/social history and prior level of function, interpretation of standardized testing/informal observation of tasks, assessment of data and development of plan of care and goals.             Ervin Larsen, OTR/L, [id number]

## 2022-07-20 ENCOUNTER — APPOINTMENT (OUTPATIENT)
Dept: CT IMAGING | Age: 67
DRG: 314 | End: 2022-07-20
Payer: COMMERCIAL

## 2022-07-20 LAB
ALBUMIN SERPL-MCNC: 2.8 G/DL (ref 3.5–5.2)
ALP BLD-CCNC: 160 U/L (ref 35–104)
ALT SERPL-CCNC: 46 U/L (ref 0–32)
ANION GAP SERPL CALCULATED.3IONS-SCNC: 10 MMOL/L (ref 7–16)
ANISOCYTOSIS: ABNORMAL
AST SERPL-CCNC: 44 U/L (ref 0–31)
BASOPHILS ABSOLUTE: 0.01 E9/L (ref 0–0.2)
BASOPHILS RELATIVE PERCENT: 0.3 % (ref 0–2)
BILIRUB SERPL-MCNC: 0.5 MG/DL (ref 0–1.2)
BUN BLDV-MCNC: 17 MG/DL (ref 6–23)
CALCIUM SERPL-MCNC: 9.4 MG/DL (ref 8.6–10.2)
CHLORIDE BLD-SCNC: 100 MMOL/L (ref 98–107)
CO2: 25 MMOL/L (ref 22–29)
CREAT SERPL-MCNC: 3.2 MG/DL (ref 0.5–1)
EOSINOPHILS ABSOLUTE: 0.07 E9/L (ref 0.05–0.5)
EOSINOPHILS RELATIVE PERCENT: 2 % (ref 0–6)
GFR AFRICAN AMERICAN: 18
GFR NON-AFRICAN AMERICAN: 18 ML/MIN/1.73
GLUCOSE BLD-MCNC: 133 MG/DL (ref 74–99)
HCT VFR BLD CALC: 28.8 % (ref 34–48)
HEMOGLOBIN: 9.1 G/DL (ref 11.5–15.5)
HYPOCHROMIA: ABNORMAL
IMMATURE GRANULOCYTES #: 0.03 E9/L
IMMATURE GRANULOCYTES %: 0.9 % (ref 0–5)
LYMPHOCYTES ABSOLUTE: 0.83 E9/L (ref 1.5–4)
LYMPHOCYTES RELATIVE PERCENT: 23.6 % (ref 20–42)
MAGNESIUM: 1.9 MG/DL (ref 1.6–2.6)
MCH RBC QN AUTO: 28 PG (ref 26–35)
MCHC RBC AUTO-ENTMCNC: 31.6 % (ref 32–34.5)
MCV RBC AUTO: 88.6 FL (ref 80–99.9)
METER GLUCOSE: 107 MG/DL (ref 74–99)
METER GLUCOSE: 220 MG/DL (ref 74–99)
METER GLUCOSE: 66 MG/DL (ref 74–99)
MONOCYTES ABSOLUTE: 0.54 E9/L (ref 0.1–0.95)
MONOCYTES RELATIVE PERCENT: 15.3 % (ref 2–12)
NEUTROPHILS ABSOLUTE: 2.04 E9/L (ref 1.8–7.3)
NEUTROPHILS RELATIVE PERCENT: 57.9 % (ref 43–80)
OVALOCYTES: ABNORMAL
PDW BLD-RTO: 17.2 FL (ref 11.5–15)
PHOSPHORUS: 3.7 MG/DL (ref 2.5–4.5)
PLATELET # BLD: 69 E9/L (ref 130–450)
PLATELET CONFIRMATION: NORMAL
PMV BLD AUTO: 12.1 FL (ref 7–12)
POIKILOCYTES: ABNORMAL
POLYCHROMASIA: ABNORMAL
POTASSIUM SERPL-SCNC: 4.9 MMOL/L (ref 3.5–5)
RBC # BLD: 3.25 E12/L (ref 3.5–5.5)
SODIUM BLD-SCNC: 135 MMOL/L (ref 132–146)
TARGET CELLS: ABNORMAL
TOTAL PROTEIN: 5.3 G/DL (ref 6.4–8.3)
WBC # BLD: 3.5 E9/L (ref 4.5–11.5)

## 2022-07-20 PROCEDURE — 83735 ASSAY OF MAGNESIUM: CPT

## 2022-07-20 PROCEDURE — 2060000000 HC ICU INTERMEDIATE R&B

## 2022-07-20 PROCEDURE — 2580000003 HC RX 258: Performed by: INTERNAL MEDICINE

## 2022-07-20 PROCEDURE — 6360000002 HC RX W HCPCS: Performed by: INTERNAL MEDICINE

## 2022-07-20 PROCEDURE — 90935 HEMODIALYSIS ONE EVALUATION: CPT

## 2022-07-20 PROCEDURE — 36415 COLL VENOUS BLD VENIPUNCTURE: CPT

## 2022-07-20 PROCEDURE — 99232 SBSQ HOSP IP/OBS MODERATE 35: CPT | Performed by: FAMILY MEDICINE

## 2022-07-20 PROCEDURE — 6370000000 HC RX 637 (ALT 250 FOR IP)

## 2022-07-20 PROCEDURE — 6370000000 HC RX 637 (ALT 250 FOR IP): Performed by: INTERNAL MEDICINE

## 2022-07-20 PROCEDURE — 6370000000 HC RX 637 (ALT 250 FOR IP): Performed by: FAMILY MEDICINE

## 2022-07-20 PROCEDURE — 82962 GLUCOSE BLOOD TEST: CPT

## 2022-07-20 PROCEDURE — 70450 CT HEAD/BRAIN W/O DYE: CPT

## 2022-07-20 PROCEDURE — 85025 COMPLETE CBC W/AUTO DIFF WBC: CPT

## 2022-07-20 PROCEDURE — 84100 ASSAY OF PHOSPHORUS: CPT

## 2022-07-20 PROCEDURE — 80053 COMPREHEN METABOLIC PANEL: CPT

## 2022-07-20 RX ORDER — ARIPIPRAZOLE 2 MG/1
2 TABLET ORAL DAILY
Status: DISCONTINUED | OUTPATIENT
Start: 2022-07-20 | End: 2022-07-22 | Stop reason: HOSPADM

## 2022-07-20 RX ORDER — MIDODRINE HYDROCHLORIDE 5 MG/1
10 TABLET ORAL
Status: DISCONTINUED | OUTPATIENT
Start: 2022-07-20 | End: 2022-07-22 | Stop reason: HOSPADM

## 2022-07-20 RX ADMIN — ATORVASTATIN CALCIUM 80 MG: 40 TABLET, FILM COATED ORAL at 19:52

## 2022-07-20 RX ADMIN — PETROLATUM: 420 OINTMENT TOPICAL at 19:53

## 2022-07-20 RX ADMIN — PETROLATUM: 420 OINTMENT TOPICAL at 15:39

## 2022-07-20 RX ADMIN — LATANOPROST 1 DROP: 50 SOLUTION OPHTHALMIC at 20:06

## 2022-07-20 RX ADMIN — DOXYCYCLINE HYCLATE 100 MG: 100 CAPSULE ORAL at 19:53

## 2022-07-20 RX ADMIN — Medication 10 ML: at 20:06

## 2022-07-20 RX ADMIN — MIDODRINE HYDROCHLORIDE 10 MG: 5 TABLET ORAL at 15:38

## 2022-07-20 RX ADMIN — TICAGRELOR 90 MG: 90 TABLET ORAL at 20:05

## 2022-07-20 RX ADMIN — ARIPIPRAZOLE 2 MG: 2 TABLET ORAL at 15:38

## 2022-07-20 RX ADMIN — ACETAMINOPHEN 650 MG: 325 TABLET ORAL at 19:53

## 2022-07-20 RX ADMIN — VANCOMYCIN HYDROCHLORIDE 1000 MG: 1 INJECTION, POWDER, LYOPHILIZED, FOR SOLUTION INTRAVENOUS at 16:02

## 2022-07-20 RX ADMIN — EPOETIN ALFA-EPBX 3000 UNITS: 3000 INJECTION, SOLUTION INTRAVENOUS; SUBCUTANEOUS at 15:46

## 2022-07-20 RX ADMIN — MIRTAZAPINE 7.5 MG: 15 TABLET, FILM COATED ORAL at 20:05

## 2022-07-20 RX ADMIN — ALLOPURINOL 100 MG: 100 TABLET ORAL at 15:37

## 2022-07-20 RX ADMIN — DOXYCYCLINE HYCLATE 100 MG: 100 CAPSULE ORAL at 15:36

## 2022-07-20 RX ADMIN — GABAPENTIN 200 MG: 100 CAPSULE ORAL at 19:53

## 2022-07-20 RX ADMIN — BRIMONIDINE TARTRATE 1 DROP: 2 SOLUTION OPHTHALMIC at 15:38

## 2022-07-20 RX ADMIN — MIDODRINE HYDROCHLORIDE 10 MG: 5 TABLET ORAL at 10:16

## 2022-07-20 RX ADMIN — GABAPENTIN 200 MG: 100 CAPSULE ORAL at 15:37

## 2022-07-20 RX ADMIN — BRIMONIDINE TARTRATE 1 DROP: 2 SOLUTION OPHTHALMIC at 19:53

## 2022-07-20 RX ADMIN — INSULIN LISPRO 1 UNITS: 100 INJECTION, SOLUTION INTRAVENOUS; SUBCUTANEOUS at 19:54

## 2022-07-20 RX ADMIN — LANTHANUM CARBONATE 1000 MG: 500 TABLET, CHEWABLE ORAL at 15:48

## 2022-07-20 RX ADMIN — SENNOSIDES 8.6 MG: 8.6 TABLET, FILM COATED ORAL at 20:06

## 2022-07-20 ASSESSMENT — ENCOUNTER SYMPTOMS
COUGH: 0
SHORTNESS OF BREATH: 0
VOMITING: 0
DIARRHEA: 0
CONSTIPATION: 0
ABDOMINAL PAIN: 0

## 2022-07-20 ASSESSMENT — PAIN SCALES - GENERAL
PAINLEVEL_OUTOF10: 0

## 2022-07-20 NOTE — PROGRESS NOTES
Department of Internal Medicine  Nephrology Attending Progress Note        SUBJECTIVE:  Mrs Jessie Alarcon being followed for ESKD    22: Pt seen on IHD earlier this AM, no CP or SOB HR did drop down to 46 and 54 during the treatment but overall HR 76-91    PMH   End-stage renal disease on dialysis   Hypertension  Hyperlipidemia  Type 2 diabetes with neuropathy, retinopathy  Anemia of chronic kidney disease (patient Jehovah witness)  Secondary hyperparathyroid disease of renal origin  History of breast cancer rt side  History of coronary disease status post heart cath with balloon angioplasty 22  Carpal tunnel syndrome with bilateral release  History of osteomyelitis/discitis  L1-L2 on IV Vanco for 6 weeks now on suppressive doxycycline  History of spinal fusion of L3/L4/L5  History of glaucoma    Physical Exam:    Vitals:    22 1400   BP: (!) 130/109   Pulse: 72   Resp:    Temp:    SpO2:    MAXIMUM TEMPERATURE OVER 24HRS:  Temp (24hrs), Av.6 °F (35.9 °C), Min:94.3 °F (34.6 °C), Max:97.4 °F (36.3 °C)    TEMPERATURE RANGE OVER 24HRS:   Temp  Av.6 °F (35.9 °C)  Min: 94.3 °F (34.6 °C)  Max: 97.4 °F (36.3 °C)  24HR RESPIRATORY RATE RANGE:  Resp  Av.2  Min: 14  Max: 18  24HR PULSE RANGE: Pulse  Av.6  Min: 63  Max: 80  CURRENT BLOOD PRESSURE:  BP: (!) 130/109    24HR BLOOD PRESSURE RANGE:  Systolic (35KDL), MUJ:727 , Min:105 , BSF:601   ; Diastolic (26INL), XDD:47, Min:35, Max:109    8HR BLOOD PRESSURE RANGE:  BP: (105-148)/()   24HR PULSE OXIMETRY RANGE:  SpO2  Av.8 %  Min: 95 %  Max: 100 %  24HR INTAKE/OUTPUT:    No intake or output data in the 24 hours ending 22 1430    8HR INTAKE OUTPUT:  No intake/output data recorded. VENT SETTINGS:       Additional Respiratory Assessments  Heart Rate: 72  Resp: 14  SpO2: 95 %      General: Awake Alert and appropriate  Neck: No JVD noted  Lungs: Diminished at the bases bilaterally. CV: Regular rate and rhythm.   No rub  Abd: Soft, tender diffusely  nondistended. Active bowel sounds  Skin: Warm and dry. No rash on exposed extremities  Ext: 1+ RUE edema (h/o breast CA);  No BLE edema ; LUE AV fistula   Neuro: Awake alert and appropriate    DATA:    CBC with Differential:    Lab Results   Component Value Date/Time    WBC 3.5 07/20/2022 03:43 AM    RBC 3.25 07/20/2022 03:43 AM    HGB 9.1 07/20/2022 03:43 AM    HCT 28.8 07/20/2022 03:43 AM    PLT 69 07/20/2022 03:43 AM    MCV 88.6 07/20/2022 03:43 AM    MCH 28.0 07/20/2022 03:43 AM    MCHC 31.6 07/20/2022 03:43 AM    RDW 17.2 07/20/2022 03:43 AM    NRBC 1.8 07/15/2022 05:20 AM    SEGSPCT 70 03/12/2014 10:53 AM    BANDSPCT 1 02/18/2015 10:35 AM    LYMPHOPCT 23.6 07/20/2022 03:43 AM    PROMYELOPCT 1.8 02/18/2017 04:00 AM    MONOPCT 15.3 07/20/2022 03:43 AM    MYELOPCT 0.9 10/24/2017 10:45 AM    BASOPCT 0.3 07/20/2022 03:43 AM    MONOSABS 0.54 07/20/2022 03:43 AM    LYMPHSABS 0.83 07/20/2022 03:43 AM    EOSABS 0.07 07/20/2022 03:43 AM    BASOSABS 0.01 07/20/2022 03:43 AM     CMP:    Lab Results   Component Value Date/Time     07/20/2022 03:43 AM    K 4.9 07/20/2022 03:43 AM    K 4.7 07/11/2022 12:46 AM     07/20/2022 03:43 AM    CO2 25 07/20/2022 03:43 AM    BUN 17 07/20/2022 03:43 AM    CREATININE 3.2 07/20/2022 03:43 AM    GFRAA 18 07/20/2022 03:43 AM    LABGLOM 18 07/20/2022 03:43 AM    GLUCOSE 133 07/20/2022 03:43 AM    GLUCOSE 46 04/02/2012 11:00 AM    PROT 5.3 07/20/2022 03:43 AM    LABALBU 2.8 07/20/2022 03:43 AM    LABALBU 3.8 04/02/2012 11:00 AM    CALCIUM 9.4 07/20/2022 03:43 AM    BILITOT 0.5 07/20/2022 03:43 AM    ALKPHOS 160 07/20/2022 03:43 AM    AST 44 07/20/2022 03:43 AM    ALT 46 07/20/2022 03:43 AM            midodrine  10 mg Oral TID WC    ARIPiprazole  2 mg Oral Daily    mirtazapine  7.5 mg Oral Nightly    polyethylene glycol  17 g Oral Daily    senna  1 tablet Oral Nightly    sodium chloride flush  5-40 mL IntraVENous 2 times per day    heparin flush 1 mL IntraVENous 2 times per day    white petrolatum   Topical BID    epoetin fani-epbx  3,000 Units SubCUTAneous Once per day on Mon Wed Fri    pantoprazole  40 mg Oral QAM AC    latanoprost  1 drop Both Eyes Daily    brimonidine  1 drop Right Eye BID    vancomycin  1,000 mg IntraVENous Q MWF    allopurinol  100 mg Oral Daily    atorvastatin  80 mg Oral Nightly    doxycycline hyclate  100 mg Oral 2 times per day    gabapentin  200 mg Oral TID    ticagrelor  90 mg Oral BID    aspirin  81 mg Oral Daily    insulin lispro  0-6 Units SubCUTAneous TID WC    insulin lispro  0-3 Units SubCUTAneous Nightly    lanthanum  1,000 mg Oral TID WC      sodium chloride      dextrose       sodium chloride flush, sodium chloride, heparin flush, ondansetron **OR** ondansetron, acetaminophen **OR** acetaminophen, glucose, dextrose bolus **OR** dextrose bolus, glucagon (rDNA), dextrose    IMPRESSION/RECOMMENDATIONS:      1. End-stage renal disease with Intradialytic Hypotension  PLAN:  1. IHD on  Monday Wednesday Friday -seen on IHD this AM shwetha 1L vol removal targeted  2. Follow labs  3. Will continue to use cooled dialysate with each treatment    2. Hyperkalemia  K+ WNL 4.9  PLAN:  1. Follow K+    3. Hypotension with   Sinus bradycardia   metoprolol and timolol eye drops discontinued-remains bradycardic-concern for autonomic dysfunction-now off  pressor-Norepi  PLAN:  1. Continue to monitor    4. Anemia of chronic kidney disease with Thrombocytopenia and Leukopenia  HgB 9.1with decrease in -->79-->67 -->100-->59-->57-->69  PLAN:  1. Continue JAYSHREE   2. Follow H/H    5. Secondary hyperparathyroidism of renal origin with Hyperphosphatemia  PO4  WNL, Ca++ WNL  PLAN:  1. Follow Ca++ and PO4  2.  Hold PO4 binder      Vivian Vicente MD  7/20/2022 2:30 PM

## 2022-07-20 NOTE — ACP (ADVANCE CARE PLANNING)
Advance Care Planning   Healthcare Decision Maker:    Primary Decision Maker: Tereso Golden - Brother/Sister - [phone]

## 2022-07-20 NOTE — PROGRESS NOTES
Daniela [address]  Progress Note    Chief complaint :  Chief Complaint   Patient presents with    Hypotension     given 2 percocet and 3 doses of midodrine     Subjective:    Patient describes feeling about the same. Pt still reports having hallucinations. She is alert and oriented x3. Reports having hx of hallucinations. Patient still reports over her chronic abdominal and back pain. As per nurses, patient was having worsening hallucinations overnight and was also hypothermic. Patient removed her sleeve which also removed her midline. Past medical, surgical, family and social history were reviewed, non-contributory, and unchanged unless otherwise stated. Review of Systems   Constitutional:  Negative for chills. Respiratory:  Negative for cough and shortness of breath. Cardiovascular:  Negative for chest pain. Gastrointestinal:  Negative for abdominal pain, constipation, diarrhea and vomiting. Endocrine: Negative for polydipsia and polyuria. Psychiatric/Behavioral:  Positive for hallucinations. Negative for confusion. Objective:  /65   Pulse 73   Temp 97.3 °F (36.3 °C) (Axillary)   Resp 18   Ht 5' 10\" (1.778 m)   Wt 179 lb 6.4 oz (81.4 kg)   SpO2 95%   BMI 25.74 kg/m²     Physical Exam  Cardiovascular:      Rate and Rhythm: Normal rate and regular rhythm. Pulmonary:      Breath sounds: No wheezing, rhonchi or rales. Abdominal:      General: Bowel sounds are normal. There is no distension. Tenderness: There is no guarding. Musculoskeletal:      Right lower leg: No edema. Left lower leg: No edema. Comments: Upper extremity non pitting edema bilaterally     Neurological:      Mental Status: She is alert.      No nasal cannula  R femoral central line removed  No PCDs on legs  No BP cuff present on arm    Labs:  Recent Results (from the past 24 hour(s))   POCT Glucose    Collection Time: 07/19/22 11:17 AM   Result Value Ref Range    Meter Glucose 89 74 - 99 mg/dL   Vitamin B12 & Folate    Collection Time: 07/19/22 12:45 PM   Result Value Ref Range    Vitamin B-12 >2000 (H) 211 - 946 pg/mL    Folate >20.0 4.8 - 24.2 ng/mL   POCT Glucose    Collection Time: 07/19/22  4:39 PM   Result Value Ref Range    Meter Glucose 94 74 - 99 mg/dL   POCT Glucose    Collection Time: 07/19/22  8:03 PM   Result Value Ref Range    Meter Glucose 96 74 - 99 mg/dL   CBC with Auto Differential    Collection Time: 07/20/22  3:43 AM   Result Value Ref Range    WBC 3.5 (L) 4.5 - 11.5 E9/L    RBC 3.25 (L) 3.50 - 5.50 E12/L    Hemoglobin 9.1 (L) 11.5 - 15.5 g/dL    Hematocrit 28.8 (L) 34.0 - 48.0 %    MCV 88.6 80.0 - 99.9 fL    MCH 28.0 26.0 - 35.0 pg    MCHC 31.6 (L) 32.0 - 34.5 %    RDW 17.2 (H) 11.5 - 15.0 fL    Platelets 69 (L) 353 - 450 E9/L    MPV 12.1 (H) 7.0 - 12.0 fL    Neutrophils % 57.9 43.0 - 80.0 %    Immature Granulocytes % 0.9 0.0 - 5.0 %    Lymphocytes % 23.6 20.0 - 42.0 %    Monocytes % 15.3 (H) 2.0 - 12.0 %    Eosinophils % 2.0 0.0 - 6.0 %    Basophils % 0.3 0.0 - 2.0 %    Neutrophils Absolute 2.04 1.80 - 7.30 E9/L    Immature Granulocytes # 0.03 E9/L    Lymphocytes Absolute 0.83 (L) 1.50 - 4.00 E9/L    Monocytes Absolute 0.54 0.10 - 0.95 E9/L    Eosinophils Absolute 0.07 0.05 - 0.50 E9/L    Basophils Absolute 0.01 0.00 - 0.20 E9/L    Anisocytosis . 1+     Polychromasia 1+     Hypochromia 1+     Poikilocytes 2+     Ovalocytes 1+     Target Cells 2+    Phosphorus    Collection Time: 07/20/22  3:43 AM   Result Value Ref Range    Phosphorus 3.7 2.5 - 4.5 mg/dL   Magnesium    Collection Time: 07/20/22  3:43 AM   Result Value Ref Range    Magnesium 1.9 1.6 - 2.6 mg/dL   Comprehensive Metabolic Panel    Collection Time: 07/20/22  3:43 AM   Result Value Ref Range    Sodium 135 132 - 146 mmol/L    Potassium 4.9 3.5 - 5.0 mmol/L    Chloride 100 98 - 107 mmol/L    CO2 25 22 - 29 mmol/L    Anion Gap 10 7 - 16 mmol/L    Glucose 133 (H) 74 - 99 mg/dL    BUN 17 6 - 23 mg/dL    CREATININE 3.2 (H) 0.5 - 1.0 mg/dL    GFR Non-African American 18 >=60 mL/min/1.73    GFR African American 18     Calcium 9.4 8.6 - 10.2 mg/dL    Total Protein 5.3 (L) 6.4 - 8.3 g/dL    Albumin 2.8 (L) 3.5 - 5.2 g/dL    Total Bilirubin 0.5 0.0 - 1.2 mg/dL    Alkaline Phosphatase 160 (H) 35 - 104 U/L    ALT 46 (H) 0 - 32 U/L    AST 44 (H) 0 - 31 U/L   Platelet Confirmation    Collection Time: 07/20/22  3:43 AM   Result Value Ref Range    Platelet Confirmation CONFIRMED    POCT Glucose    Collection Time: 07/20/22  5:39 AM   Result Value Ref Range    Meter Glucose 107 (H) 74 - 99 mg/dL       Radiology and other tests reviewed:  CT ABDOMEN PELVIS W WO CONTRAST Additional Contrast? Oral   Final Result   Stable lumbar spine endplate erosions. This may represent dialysis related   spondyloarthropathy. MRI lumbar spine would better differentiate from   infectious discitis-osteomyelitis, if clinically indicated. Trace pleural effusions, new from prior. Anasarca. Decreased bladder wall thickening; correlate with urinalysis to evaluate for   UTI. No evidence of pyelonephritis. See other incidental findings above. XR CHEST PORTABLE   Final Result   No acute process.          NM GASTRIC EMPTYING    (Results Pending)   CT HEAD WO CONTRAST    (Results Pending)       Assessment:  Active Hospital Problems    Diagnosis Date Noted    Atherosclerosis of native coronary artery of native heart without angina pectoris [I25.10]      Priority: High    Hypotension [I95.9] 07/11/2022     Priority: Medium    Vertebral osteomyelitis, chronic (HCC) [M46.20] 07/11/2022     Priority: Medium    Symptomatic sinus bradycardia [R00.1]      Priority: Medium    Anemia, chronic disease [D63.8] 12/24/2012     Priority: Low    ESRD (end stage renal disease) on dialysis ([facility] [N18.6, Z99.2]     Mixed hyperlipidemia [E78.2]     Lymphedema of right upper extremity [I89.0]     Controlled type 2 diabetes mellitus with chronic kidney disease on chronic dialysis, with long-term current use of insulin ([facility] [E11.22, N18.6, Z79.4, Z99.2] 02/22/2017    Constipation [K59.00] 09/30/2014    Chronic diastolic CHF (congestive heart failure) ([facility] [I50.32] 09/23/2014       Plan:  Hypotension  /65 Stable off pressors. Midodrine increased twice up to 20 mg now. Dopamine drip 7/11/22-7/13/22. Started Levophed, d/c, restarted, now stopped again (last dose 10:20am 7/17/22). Received albumin infusion 7/13/22. Rule out infection. Blood cultures negative, procal 0.42 (^), CRP 1.4. Continue midodrine 10 mg three times daily, has been slowly weaned  - Stop IVF  Continue BP readings from lower extremity  Consider order Dopper u/s of R arm      Abdominal Pain  CT abdomen pelvis (7/17/22): Stable lumbar spine endplate erosions. This may represent dialysis related  spondyloarthropathy. MRI lumbar spine would better differentiate from infectious discitis-osteomyelitis, if clinically indicated. Trace pleural effusions, new from prior. Anasarca. Decreased bladder wall thickening; correlate with urinalysis to evaluate for UTI. No evidence of pyelonephritis. Consider home Acidophilus lactobacillus capsules  Continue Glycolax (polyethylene glycol) daily  Continue Senokot nightly    Early Satiety  Continue Protonix 40 mg before breakfast  Pending emptying study   Continue home mirtazapine (Remeron) 7.5 mg nightly   Consider restart home prochlorperazine (Compazine) 5 mg PRN Q6hr      Hallucinations and Hypothermia   Ordered CT head to rule out any acute abnormalities  Patient was also hypothermic overnight  We will start patient on Abilify 2.5 mg daily    Bradycardia : improving  Hold home metoprolol     ESRD  Pt able to make some urine.   -Nephrology following, appreciate input  -Dialysis (MWF), plan for dialysis today   -I/Os     HFrEF  KIARA (4/19/22) showed mildly reduced LV function. EF 40-45%.   Hold home Imdur 30 mg daily  Hold home Bumex 2 mg Sun-Odine- Thur  Hold home metoprolol 25 mg daily     CAD  S/p stent placement w/ balloon angioplasty (5/2022). (EKG 7/11/22) showed sinus bradycardia with first degree AV block compared to previous. Continue home ASA 81 mg daily  Continue home ticagrelor (Brilinta) 90 mg BID     Chronic Vertebral Osteomyelitis  L1-L2. On IV vanc for 6 weeks now on suppressive doxycycline. Continue vancomycin 1000 mg three times per week  Continue doxycycline 100 mg BID, day #9     Hyperuricemia  Continue home allopurinol 100 mg BID     HLD  Continue home Lipitor 80 mg nightly     T2DM  With neuropathy. Insulin-dependent  Hold 5 u Lantus (insulin glargine) nightly   Hold Humalog (insulin lispro) nightly & with meals   Gabapentin 200 mg TID     Anemia of CKD  NO BLOOD PRODUCTS (Confucianism).    Nephrology following, appreciate input  Continue JAYSHREE M/W/F, last dose 7/18/22     Secondary Hyperparathyroidism  Phosphorus 3.7   Nephrology following, appreciate input:  Hold home Lanthanum (phosphate binder) 1000 mg three times weekly    Glaucoma  Timolol eyedrops discontinued, Alphagan (brimonidine) instead BID  latanoprost (Xalatan) eyedrops daily      Pain Control:  Tylenol 650 mg Q6HR PRN for mild pain  DVT ppx: PCDs  GI ppx: Protonix, Glycolax, Senokot daily  Code Status: Full  Diet: N.p.o. for gastric study    Disposition: Discharge to SNF, patient from [facility] at [address] William MD   Family Medicine Resident   PGY3  07/20/22   10:42 AM

## 2022-07-20 NOTE — FLOWSHEET NOTE
07/20/22 1435   Vital Signs   BP (!) 127/34   Temp 97.6 °F (36.4 °C)   Heart Rate 77   Resp 16   Weight 168 lb 14.4 oz (76.6 kg)   Weight Method Actual;Bed scale   Percent Weight Change -5.88   Post-Hemodialysis Assessment   Post-Treatment Procedures Blood returned; Access bleeding time < 10 minutes   Machine Disinfection Process Exterior Machine Disinfection   Rinseback Volume (ml) 300 ml   Blood Volume Processed (Liters) 750 l/min   Duration of Treatment (minutes) 210 minutes   Heparin Amount Administered During Treatment (mL) 0 units   Hemodialysis Intake (ml) 300 ml   Hemodialysis Output (ml) 1000 ml   NET Removed (ml) 700   Tolerated Treatment Good   Patient Response to Treatment rinse back given. 3 1/2 hr ordered tx completed. all blood returned to pt. lines disconnected. needles pulled. pressure held to sites. 2x2's and tape applied. report called to [employer].    Bilateral Breath Sounds Diminished   Edema Generalized   Physician Notified No   Time Off 1424

## 2022-07-20 NOTE — CARE COORDINATION
For gastric emptying study and CT head today. Needs new midline inserted. PT am-pac 14 on 7/19. Plan remains to return to [facility] on discharge- precert started 7/00- DO NOT have to wait for auth. Will need COVID test on day of discharge. Receives dialysis @ [facility] M-W-F- notify when pt is discharging [phone]. Await final abx plan- receiving iv Vanc after HD. YOKO in epic, carlos transport form on chart. Will follow. SW updated Nory Bonds RNcase manager    [id number]: Received Nataly Prey for [address] good through 7/22- not ready for discharge today.  Nory Bonds RNcase manager

## 2022-07-20 NOTE — PROGRESS NOTES
Patient pulled out Midline. 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  Dr. Yeni Linder notified, no new orders at this time

## 2022-07-21 ENCOUNTER — APPOINTMENT (OUTPATIENT)
Dept: NUCLEAR MEDICINE | Age: 67
DRG: 314 | End: 2022-07-21
Payer: COMMERCIAL

## 2022-07-21 VITALS
TEMPERATURE: 97.5 F | SYSTOLIC BLOOD PRESSURE: 107 MMHG | WEIGHT: 226.4 LBS | DIASTOLIC BLOOD PRESSURE: 42 MMHG | HEIGHT: 70 IN | RESPIRATION RATE: 18 BRPM | BODY MASS INDEX: 32.41 KG/M2 | HEART RATE: 83 BPM | OXYGEN SATURATION: 95 %

## 2022-07-21 PROBLEM — Z89.411 STATUS POST AMPUTATION OF RIGHT GREAT TOE (HCC): Status: ACTIVE | Noted: 2022-07-21

## 2022-07-21 LAB
ALBUMIN SERPL-MCNC: 2.9 G/DL (ref 3.5–5.2)
ALP BLD-CCNC: 147 U/L (ref 35–104)
ALT SERPL-CCNC: 40 U/L (ref 0–32)
ANION GAP SERPL CALCULATED.3IONS-SCNC: 11 MMOL/L (ref 7–16)
AST SERPL-CCNC: 38 U/L (ref 0–31)
BASOPHILS ABSOLUTE: 0.01 E9/L (ref 0–0.2)
BASOPHILS RELATIVE PERCENT: 0.3 % (ref 0–2)
BILIRUB SERPL-MCNC: 0.4 MG/DL (ref 0–1.2)
BUN BLDV-MCNC: 12 MG/DL (ref 6–23)
CALCIUM SERPL-MCNC: 9.4 MG/DL (ref 8.6–10.2)
CHLORIDE BLD-SCNC: 101 MMOL/L (ref 98–107)
CO2: 26 MMOL/L (ref 22–29)
CREAT SERPL-MCNC: 2.4 MG/DL (ref 0.5–1)
EOSINOPHILS ABSOLUTE: 0.07 E9/L (ref 0.05–0.5)
EOSINOPHILS RELATIVE PERCENT: 2.1 % (ref 0–6)
GFR AFRICAN AMERICAN: 24
GFR NON-AFRICAN AMERICAN: 24 ML/MIN/1.73
GLUCOSE BLD-MCNC: 72 MG/DL (ref 74–99)
HCT VFR BLD CALC: 26.1 % (ref 34–48)
HEMOGLOBIN: 8.3 G/DL (ref 11.5–15.5)
IMMATURE GRANULOCYTES #: 0.02 E9/L
IMMATURE GRANULOCYTES %: 0.6 % (ref 0–5)
LYMPHOCYTES ABSOLUTE: 0.82 E9/L (ref 1.5–4)
LYMPHOCYTES RELATIVE PERCENT: 25 % (ref 20–42)
MAGNESIUM: 2 MG/DL (ref 1.6–2.6)
MCH RBC QN AUTO: 28.2 PG (ref 26–35)
MCHC RBC AUTO-ENTMCNC: 31.8 % (ref 32–34.5)
MCV RBC AUTO: 88.8 FL (ref 80–99.9)
METER GLUCOSE: 105 MG/DL (ref 74–99)
METER GLUCOSE: 77 MG/DL (ref 74–99)
METER GLUCOSE: 83 MG/DL (ref 74–99)
METER GLUCOSE: 96 MG/DL (ref 74–99)
MONOCYTES ABSOLUTE: 0.47 E9/L (ref 0.1–0.95)
MONOCYTES RELATIVE PERCENT: 14.3 % (ref 2–12)
NEUTROPHILS ABSOLUTE: 1.89 E9/L (ref 1.8–7.3)
NEUTROPHILS RELATIVE PERCENT: 57.7 % (ref 43–80)
PDW BLD-RTO: 17.1 FL (ref 11.5–15)
PHOSPHORUS: 3.1 MG/DL (ref 2.5–4.5)
PLATELET # BLD: 73 E9/L (ref 130–450)
PLATELET CONFIRMATION: NORMAL
PMV BLD AUTO: 11.5 FL (ref 7–12)
POTASSIUM SERPL-SCNC: 4.2 MMOL/L (ref 3.5–5)
RBC # BLD: 2.94 E12/L (ref 3.5–5.5)
SARS-COV-2, NAAT: NOT DETECTED
SODIUM BLD-SCNC: 138 MMOL/L (ref 132–146)
TOTAL PROTEIN: 5.1 G/DL (ref 6.4–8.3)
WBC # BLD: 3.3 E9/L (ref 4.5–11.5)

## 2022-07-21 PROCEDURE — 6370000000 HC RX 637 (ALT 250 FOR IP): Performed by: INTERNAL MEDICINE

## 2022-07-21 PROCEDURE — 99239 HOSP IP/OBS DSCHRG MGMT >30: CPT | Performed by: FAMILY MEDICINE

## 2022-07-21 PROCEDURE — 3430000000 HC RX DIAGNOSTIC RADIOPHARMACEUTICAL: Performed by: RADIOLOGY

## 2022-07-21 PROCEDURE — 87635 SARS-COV-2 COVID-19 AMP PRB: CPT

## 2022-07-21 PROCEDURE — 80053 COMPREHEN METABOLIC PANEL: CPT

## 2022-07-21 PROCEDURE — 85025 COMPLETE CBC W/AUTO DIFF WBC: CPT

## 2022-07-21 PROCEDURE — 84100 ASSAY OF PHOSPHORUS: CPT

## 2022-07-21 PROCEDURE — 36415 COLL VENOUS BLD VENIPUNCTURE: CPT

## 2022-07-21 PROCEDURE — A9541 TC99M SULFUR COLLOID: HCPCS | Performed by: RADIOLOGY

## 2022-07-21 PROCEDURE — 78264 GASTRIC EMPTYING IMG STUDY: CPT

## 2022-07-21 PROCEDURE — 6370000000 HC RX 637 (ALT 250 FOR IP): Performed by: FAMILY MEDICINE

## 2022-07-21 PROCEDURE — 6370000000 HC RX 637 (ALT 250 FOR IP)

## 2022-07-21 PROCEDURE — 82962 GLUCOSE BLOOD TEST: CPT

## 2022-07-21 PROCEDURE — 83735 ASSAY OF MAGNESIUM: CPT

## 2022-07-21 PROCEDURE — 2580000003 HC RX 258: Performed by: INTERNAL MEDICINE

## 2022-07-21 PROCEDURE — 2060000000 HC ICU INTERMEDIATE R&B

## 2022-07-21 RX ORDER — ARIPIPRAZOLE 2 MG/1
2 TABLET ORAL DAILY
Qty: 30 TABLET | Refills: 0 | DISCHARGE
Start: 2022-07-22 | End: 2022-08-01

## 2022-07-21 RX ORDER — SENNA PLUS 8.6 MG/1
1 TABLET ORAL NIGHTLY PRN
Qty: 30 TABLET | Refills: 0 | Status: ON HOLD | DISCHARGE
Start: 2022-07-21 | End: 2022-08-10 | Stop reason: HOSPADM

## 2022-07-21 RX ORDER — BRIMONIDINE TARTRATE 2 MG/ML
1 SOLUTION/ DROPS OPHTHALMIC 2 TIMES DAILY
Qty: 1 EACH | Refills: 0 | Status: SHIPPED | OUTPATIENT
Start: 2022-07-21

## 2022-07-21 RX ORDER — MIDODRINE HYDROCHLORIDE 10 MG/1
10 TABLET ORAL
Qty: 90 TABLET | Refills: 3 | Status: SHIPPED | OUTPATIENT
Start: 2022-07-21

## 2022-07-21 RX ORDER — POLYETHYLENE GLYCOL 3350 17 G/17G
17 POWDER, FOR SOLUTION ORAL DAILY
Qty: 527 G | Refills: 1 | DISCHARGE
Start: 2022-07-22 | End: 2022-08-21

## 2022-07-21 RX ADMIN — GABAPENTIN 200 MG: 100 CAPSULE ORAL at 21:02

## 2022-07-21 RX ADMIN — PETROLATUM: 420 OINTMENT TOPICAL at 11:20

## 2022-07-21 RX ADMIN — DOXYCYCLINE HYCLATE 100 MG: 100 CAPSULE ORAL at 11:19

## 2022-07-21 RX ADMIN — ALLOPURINOL 100 MG: 100 TABLET ORAL at 11:19

## 2022-07-21 RX ADMIN — Medication 10 ML: at 11:30

## 2022-07-21 RX ADMIN — MIDODRINE HYDROCHLORIDE 10 MG: 5 TABLET ORAL at 17:47

## 2022-07-21 RX ADMIN — ATORVASTATIN CALCIUM 80 MG: 40 TABLET, FILM COATED ORAL at 21:02

## 2022-07-21 RX ADMIN — PANTOPRAZOLE SODIUM 40 MG: 40 TABLET, DELAYED RELEASE ORAL at 05:20

## 2022-07-21 RX ADMIN — Medication 10 ML: at 21:04

## 2022-07-21 RX ADMIN — GABAPENTIN 200 MG: 100 CAPSULE ORAL at 17:47

## 2022-07-21 RX ADMIN — TICAGRELOR 90 MG: 90 TABLET ORAL at 21:03

## 2022-07-21 RX ADMIN — MIRTAZAPINE 7.5 MG: 15 TABLET, FILM COATED ORAL at 21:02

## 2022-07-21 RX ADMIN — DOXYCYCLINE HYCLATE 100 MG: 100 CAPSULE ORAL at 21:04

## 2022-07-21 RX ADMIN — MIDODRINE HYDROCHLORIDE 10 MG: 5 TABLET ORAL at 11:18

## 2022-07-21 RX ADMIN — Medication 1 MILLICURIE: at 07:56

## 2022-07-21 RX ADMIN — BRIMONIDINE TARTRATE 1 DROP: 2 SOLUTION OPHTHALMIC at 11:21

## 2022-07-21 RX ADMIN — SENNOSIDES 8.6 MG: 8.6 TABLET, FILM COATED ORAL at 21:02

## 2022-07-21 RX ADMIN — ARIPIPRAZOLE 2 MG: 2 TABLET ORAL at 11:19

## 2022-07-21 RX ADMIN — GABAPENTIN 200 MG: 100 CAPSULE ORAL at 11:19

## 2022-07-21 RX ADMIN — TICAGRELOR 90 MG: 90 TABLET ORAL at 11:19

## 2022-07-21 RX ADMIN — LATANOPROST 1 DROP: 50 SOLUTION OPHTHALMIC at 21:03

## 2022-07-21 RX ADMIN — ACETAMINOPHEN 650 MG: 325 TABLET ORAL at 11:46

## 2022-07-21 RX ADMIN — ASPIRIN 81 MG: 81 TABLET, COATED ORAL at 11:18

## 2022-07-21 RX ADMIN — BRIMONIDINE TARTRATE 1 DROP: 2 SOLUTION OPHTHALMIC at 21:03

## 2022-07-21 RX ADMIN — PETROLATUM: 420 OINTMENT TOPICAL at 21:03

## 2022-07-21 ASSESSMENT — PAIN SCALES - GENERAL
PAINLEVEL_OUTOF10: 3
PAINLEVEL_OUTOF10: 0
PAINLEVEL_OUTOF10: 0

## 2022-07-21 ASSESSMENT — PAIN DESCRIPTION - ORIENTATION: ORIENTATION: RIGHT;LEFT

## 2022-07-21 ASSESSMENT — PAIN - FUNCTIONAL ASSESSMENT: PAIN_FUNCTIONAL_ASSESSMENT: PREVENTS OR INTERFERES SOME ACTIVE ACTIVITIES AND ADLS

## 2022-07-21 ASSESSMENT — PAIN DESCRIPTION - DESCRIPTORS: DESCRIPTORS: ACHING;DISCOMFORT

## 2022-07-21 ASSESSMENT — PAIN DESCRIPTION - LOCATION: LOCATION: BACK

## 2022-07-21 NOTE — PROGRESS NOTES
Dr. Derald Shone updated patient temp 94.1 rectal with amadeo damon on, /33. Patient is still okay for discharge.

## 2022-07-21 NOTE — PROGRESS NOTES
Comprehensive Nutrition Assessment    Type and Reason for Visit:  Reassess    Nutrition Recommendations/Plan:   Continue current diet/ONS as tolerated     Malnutrition Assessment:  Malnutrition Status: Moderate malnutrition (07/15/22 1030)    Context:  Chronic Illness     Findings of the 6 clinical characteristics of malnutrition:  Energy Intake:  Mild decrease in energy intake (Comment)  Weight Loss:  5% over 1 month (per EMR Review, fluctuations 2/2 losses/HD. States # on HD.)     Body Fat Loss:  Unable to assess     Muscle Mass Loss:  Severe muscle mass loss Calf (gastrocnemius), Thigh (quadraceps)  Fluid Accumulation:  Unable to assess     Strength:  Not Performed    Nutrition Assessment:    Pt w/ abdominal pain/early satiety w/ plan for gastric emptying study today. Pt remians at nutritional risk 2/2 moderate malnutrition. Continue current diet/ONS & monitor. Nutrition Related Findings:    A&O confused at times/hallucinations, +11.8 L, +1-3 edema, anasarca, abd soft, +BS, +HD Wound Type: None (incision upper R chest)       Current Nutrition Intake & Therapies:    Average Meal Intake: 26-50%, %  Average Supplements Intake: Unable to assess  ADULT DIET; Regular  ADULT ORAL NUTRITION SUPPLEMENT; Breakfast, Dinner; Standard 4 oz Oral Supplement    Anthropometric Measures:  Height: 5' 10\" (177.8 cm)  Ideal Body Weight (IBW): 150 lbs (68 kg)    Admission Body Weight: 145 lb 1 oz (65.8 kg) (7/11/22 first measured Bedscale weight)  Current Body Weight: 168 lb (76.2 kg) (7/20 bed s/p HD, elevated (+I&Os/edema)), 96.6 % IBW. Weight Source: Bed Scale  Current BMI (kg/m2): 24.1  Usual Body Weight: 159 lb (72.1 kg) (6/15/22 actual, bedscale.   EMR Wt trend w/ large fluctuations 2/2 fluid/losses, on HD (4/13 81kg, 6/23 64.2kg, 6/15 72.1 kg))  % Weight Change (Calculated): -8.9  Weight Adjustment For: No Adjustment                 BMI Categories: Underweight (BMI less than 22) age over 72    Estimated

## 2022-07-21 NOTE — PLAN OF CARE
Problem: ABCDS Injury Assessment  Goal: Absence of physical injury  Outcome: Progressing     Problem: Skin/Tissue Integrity  Goal: Absence of new skin breakdown  Description: 1. Monitor for areas of redness and/or skin breakdown  2. Assess vascular access sites hourly  3. Every 4-6 hours minimum:  Change oxygen saturation probe site  4. Every 4-6 hours:  If on nasal continuous positive airway pressure, respiratory therapy assess nares and determine need for appliance change or resting period. Outcome: Progressing     Problem: Discharge Planning  Goal: Discharge to home or other facility with appropriate resources  Outcome: Progressing     Problem: Safety - Adult  Goal: Free from fall injury  Outcome: Progressing     Problem: Chronic Conditions and Co-morbidities  Goal: Patient's chronic conditions and co-morbidity symptoms are monitored and maintained or improved  Outcome: Progressing     Problem: Pain  Goal: Verbalizes/displays adequate comfort level or baseline comfort level  Outcome: Progressing     Problem: Nutrition Deficit:  Goal: Optimize nutritional status  Outcome: Progressing     Problem: Confusion  Goal: Confusion, delirium, dementia, or psychosis is improved or at baseline  Description: INTERVENTIONS:  1. Assess for possible contributors to thought disturbance, including medications, impaired vision or hearing, underlying metabolic abnormalities, dehydration, psychiatric diagnoses, and notify attending LIP  2. [place] high risk fall precautions, as indicated  3. Provide frequent short contacts to provide reality reorientation, refocusing and direction  4. Decrease environmental stimuli, including noise as appropriate  5. Monitor and intervene to maintain adequate nutrition, hydration, elimination, sleep and activity  6. If unable to ensure safety without constant attention obtain sitter and review sitter guidelines with assigned personnel  7.  Initiate Psychosocial CNS and Spiritual Care consult, as indicated  Outcome: Progressing

## 2022-07-21 NOTE — CARE COORDINATION
Discharge order noted. Ambulance transportation set up w/ PAS-  time 9:30 pm. COVID pending. Facility, nursing, sister Georgina Huang [phone] notified of destination and time.  YOKO in epic, transport form on chart Karolina Zaman RN case manager

## 2022-07-21 NOTE — CARE COORDINATION
For gastric emptying study today. Auth received for [facility] - good through 7/22-DO NOT have to wait for auth if need to resubmit precert. Will need COVID test on day of discharge. Receives dialysis @ [address] [facility]den Richardson M-W-F- notify when pt is discharging [phone]. Await final abx plan- receiving iv Vanc after HD. YOKO in epiccarlos transport form on chart. Will follow.  SW updated  Terence Ricketts, KERRI case manager

## 2022-07-21 NOTE — PROGRESS NOTES
Dr. Gina Gant updated Auth received for [facility] when medically stable.

## 2022-07-21 NOTE — PROGRESS NOTES
Department of Internal Medicine  Nephrology Attending Progress Note        SUBJECTIVE:  Mrs Lakesha Etienne being followed for ESKD    22: Pt awake alert and appropriate, states she feels about the same, at the time of my visit T 94.5    PMH   End-stage renal disease on dialysis   Hypertension  Hyperlipidemia  Type 2 diabetes with neuropathy, retinopathy  Anemia of chronic kidney disease (patient Jehovah witness)  Secondary hyperparathyroid disease of renal origin  History of breast cancer rt side  History of coronary disease status post heart cath with balloon angioplasty 22  Carpal tunnel syndrome with bilateral release  History of osteomyelitis/discitis  L1-L2 on IV Vanco for 6 weeks now on suppressive doxycycline  History of spinal fusion of L3/L4/L5  History of glaucoma    Physical Exam:    Vitals:    22 1111   BP: (!) 108/58   Pulse: 77   Resp: 18   Temp: (!) 94.5 °F (34.7 °C)   SpO2: 97%   MAXIMUM TEMPERATURE OVER 24HRS:  Temp (24hrs), Av.7 °F (35.9 °C), Min:94.5 °F (34.7 °C), Max:97.6 °F (36.4 °C)    TEMPERATURE RANGE OVER 24HRS:   Temp  Av.7 °F (35.9 °C)  Min: 94.5 °F (34.7 °C)  Max: 97.6 °F (36.4 °C)  24HR RESPIRATORY RATE RANGE:  Resp  Av.5  Min: 16  Max: 18  24HR PULSE RANGE: Pulse  Av  Min: 70  Max: 79  CURRENT BLOOD PRESSURE:  BP: (!) 108/58    24HR BLOOD PRESSURE RANGE:  Systolic (93ZRN), ZAT:435 , Min:95 , WIC:238   ; Diastolic (81HZT), OQY:59, Min:34, Max:109    8HR BLOOD PRESSURE RANGE:  BP: (108)/(58)   24HR PULSE OXIMETRY RANGE:  SpO2  Av.3 %  Min: 96 %  Max: 99 %  24HR INTAKE/OUTPUT:      Intake/Output Summary (Last 24 hours) at 2022 1347  Last data filed at 2022 1435  Gross per 24 hour   Intake 300 ml   Output 1000 ml   Net -700 ml       8HR INTAKE OUTPUT:  No intake/output data recorded.   VENT SETTINGS:       Additional Respiratory Assessments  Heart Rate: 77  Resp: 18  SpO2: 97 %      General: Awake Alert and appropriate  Neck: No JVD noted  Lungs: Diminished at the bases bilaterally. CV: Regular rate and rhythm. No rub  Abd: Soft, tender diffusely  nondistended. Active bowel sounds  Skin: Warm and dry. No rash on exposed extremities  Ext: 1+ RUE edema (h/o breast CA);  No BLE edema ; LUE AV fistula   Neuro: Awake alert and appropriate    DATA:    CBC with Differential:    Lab Results   Component Value Date/Time    WBC 3.3 07/21/2022 07:15 AM    RBC 2.94 07/21/2022 07:15 AM    HGB 8.3 07/21/2022 07:15 AM    HCT 26.1 07/21/2022 07:15 AM    PLT 73 07/21/2022 07:15 AM    MCV 88.8 07/21/2022 07:15 AM    MCH 28.2 07/21/2022 07:15 AM    MCHC 31.8 07/21/2022 07:15 AM    RDW 17.1 07/21/2022 07:15 AM    NRBC 1.8 07/15/2022 05:20 AM    SEGSPCT 70 03/12/2014 10:53 AM    BANDSPCT 1 02/18/2015 10:35 AM    LYMPHOPCT 25.0 07/21/2022 07:15 AM    PROMYELOPCT 1.8 02/18/2017 04:00 AM    MONOPCT 14.3 07/21/2022 07:15 AM    MYELOPCT 0.9 10/24/2017 10:45 AM    BASOPCT 0.3 07/21/2022 07:15 AM    MONOSABS 0.47 07/21/2022 07:15 AM    LYMPHSABS 0.82 07/21/2022 07:15 AM    EOSABS 0.07 07/21/2022 07:15 AM    BASOSABS 0.01 07/21/2022 07:15 AM     CMP:    Lab Results   Component Value Date/Time     07/21/2022 07:15 AM    K 4.2 07/21/2022 07:15 AM    K 4.7 07/11/2022 12:46 AM     07/21/2022 07:15 AM    CO2 26 07/21/2022 07:15 AM    BUN 12 07/21/2022 07:15 AM    CREATININE 2.4 07/21/2022 07:15 AM    GFRAA 24 07/21/2022 07:15 AM    LABGLOM 24 07/21/2022 07:15 AM    GLUCOSE 72 07/21/2022 07:15 AM    GLUCOSE 46 04/02/2012 11:00 AM    PROT 5.1 07/21/2022 07:15 AM    LABALBU 2.9 07/21/2022 07:15 AM    LABALBU 3.8 04/02/2012 11:00 AM    CALCIUM 9.4 07/21/2022 07:15 AM    BILITOT 0.4 07/21/2022 07:15 AM    ALKPHOS 147 07/21/2022 07:15 AM    AST 38 07/21/2022 07:15 AM    ALT 40 07/21/2022 07:15 AM            midodrine  10 mg Oral TID WC    ARIPiprazole  2 mg Oral Daily    mirtazapine  7.5 mg Oral Nightly    polyethylene glycol  17 g Oral Daily    senna  1 tablet Oral Nightly    sodium chloride flush  5-40 mL IntraVENous 2 times per day    heparin flush  1 mL IntraVENous 2 times per day    white petrolatum   Topical BID    epoetin fani-epbx  3,000 Units SubCUTAneous Once per day on Mon Wed Fri    pantoprazole  40 mg Oral QAM AC    latanoprost  1 drop Both Eyes Daily    brimonidine  1 drop Right Eye BID    vancomycin  1,000 mg IntraVENous Q MWF    allopurinol  100 mg Oral Daily    atorvastatin  80 mg Oral Nightly    doxycycline hyclate  100 mg Oral 2 times per day    gabapentin  200 mg Oral TID    ticagrelor  90 mg Oral BID    aspirin  81 mg Oral Daily    insulin lispro  0-6 Units SubCUTAneous TID WC    insulin lispro  0-3 Units SubCUTAneous Nightly      sodium chloride      dextrose       sodium chloride flush, sodium chloride, heparin flush, ondansetron **OR** ondansetron, acetaminophen **OR** acetaminophen, glucose, dextrose bolus **OR** dextrose bolus, glucagon (rDNA), dextrose    IMPRESSION/RECOMMENDATIONS:      1. End-stage renal disease with Intradialytic Hypotension  PLAN:  1. IHD on  Monday Wednesday Friday -for IHD in the AM if not discharged  2. Follow labs  3. Will continue to use cooled dialysate with each treatment    2. Hyperkalemia  K+ WNL 4.2  PLAN:  1. Follow K+    3. Hypotension with   Sinus bradycardia   metoprolol and timolol eye drops discontinued-remains bradycardic-concern for autonomic dysfunction-now off  pressor  PLAN:  1. Continue to monitor    4. Anemia of chronic kidney disease with Thrombocytopenia and Leukopenia  HgB 9.1-->8.3 with decrease in -->79-->67 -->100-->59-->57-->69-->73  PLAN:  1. Continue JAYSHREE   2. Follow H/H    5. Secondary hyperparathyroidism of renal origin with Hyperphosphatemia  PO4  WNL, Ca++ WNL  PLAN:  1. Follow Ca++ and PO4  2.  Hold PO4 binder      Marisol Madsen MD  7/21/2022 1:47 PM

## 2022-07-21 NOTE — PROGRESS NOTES
Daniela [address]  Progress Note    Chief complaint :  Chief Complaint   Patient presents with    Hypotension     given 2 percocet and 3 doses of midodrine     Subjective:    Patient describes feeling about the same. She states that her right arm feels better. She denies CP, SOB, N/V. She is alert and oriented x3. Continues with complaint of chronic abdominal and back pain. No acute overnight events. Per nurse, patient not with clear mentation but was easily redirectable    Past medical, surgical, family and social history were reviewed, non-contributory, and unchanged unless otherwise stated. Objective:  BP (!) 104/50   Pulse 70   Temp 97.5 °F (36.4 °C) (Axillary)   Resp 18   Ht 5' 10\" (1.778 m)   Wt 226 lb 6.4 oz (102.7 kg)   SpO2 97%   BMI 32.49 kg/m²     Physical Exam  Constitutional:       Appearance: Normal appearance. HENT:      Head: Normocephalic and atraumatic. Mouth/Throat:      Mouth: Mucous membranes are moist.   Eyes:      Extraocular Movements: Extraocular movements intact. Conjunctiva/sclera: Conjunctivae normal.   Cardiovascular:      Rate and Rhythm: Normal rate and regular rhythm. Pulses: Normal pulses. Heart sounds: Normal heart sounds. No murmur heard. Pulmonary:      Effort: Pulmonary effort is normal.      Breath sounds: No stridor. No wheezing, rhonchi or rales. Abdominal:      General: Abdomen is flat. Bowel sounds are normal. There is no distension. Palpations: Abdomen is soft. Tenderness: There is abdominal tenderness (diffuse, mild). There is no guarding. Musculoskeletal:         General: No swelling. Normal range of motion. Cervical back: Normal range of motion and neck supple. Right lower leg: No edema. Left lower leg: No edema. Comments: B/L UE swelling L>R. Sleeve seen on RUE. Pitting L hand edema. No tenderness. Skin:     General: Skin is warm and dry.    Neurological: General: No focal deficit present. Mental Status: She is alert and oriented to person, place, and time. Psychiatric:         Mood and Affect: Mood normal.         Behavior: Behavior normal.       Labs:  Recent Results (from the past 24 hour(s))   POCT Glucose    Collection Time: 07/20/22  3:25 PM   Result Value Ref Range    Meter Glucose 66 (L) 74 - 99 mg/dL   POCT Glucose    Collection Time: 07/20/22  7:43 PM   Result Value Ref Range    Meter Glucose 220 (H) 74 - 99 mg/dL   POCT Glucose    Collection Time: 07/21/22  5:21 AM   Result Value Ref Range    Meter Glucose 83 74 - 99 mg/dL       Radiology and other tests reviewed:  CT HEAD WO CONTRAST   Final Result   No acute intracranial abnormality. No significant interval change from previous exam with no evidence of   hemorrhage. CT ABDOMEN PELVIS W WO CONTRAST Additional Contrast? Oral   Final Result   Stable lumbar spine endplate erosions. This may represent dialysis related   spondyloarthropathy. MRI lumbar spine would better differentiate from   infectious discitis-osteomyelitis, if clinically indicated. Trace pleural effusions, new from prior. Anasarca. Decreased bladder wall thickening; correlate with urinalysis to evaluate for   UTI. No evidence of pyelonephritis. See other incidental findings above. XR CHEST PORTABLE   Final Result   No acute process.          NM GASTRIC EMPTYING    (Results Pending)       Assessment:  Active Hospital Problems    Diagnosis Date Noted    Atherosclerosis of native coronary artery of native heart without angina pectoris [I25.10]      Priority: High    Hypotension [I95.9] 07/11/2022     Priority: Medium    Vertebral osteomyelitis, chronic (HCC) [M46.20] 07/11/2022     Priority: Medium    Symptomatic sinus bradycardia [R00.1]      Priority: Medium    Anemia, chronic disease [D63.8] 12/24/2012     Priority: Low    ESRD (end stage renal disease) on dialysis ([facility] [N18.6, Z99.2] Mixed hyperlipidemia [E78.2]     Lymphedema of right upper extremity [I89.0]     Controlled type 2 diabetes mellitus with chronic kidney disease on chronic dialysis, with long-term current use of insulin ([facility] [address] [E11.22, N18.6, Z79.4, Z99.2] 02/22/2017    Constipation [K59.00] 09/30/2014    Chronic diastolic CHF (congestive heart failure) ([facility] [I50.32] 09/23/2014       Plan:  Hypotension  Home Midodrine 5 mg QD. Was in ICU requiring Levophed which was weaned off and has been transferred to the floor since 7/19.  - Blood cx x 2 negative   - Continue midodrine 10 mg three times daily, has been slowly weaned    - Continue BP readings from lower extremity     Abdominal Pain  CT abdomen pelvis (7/17/22): Stable lumbar spine endplate erosions. This may represent dialysis related  spondyloarthropathy. MRI lumbar spine would better differentiate from infectious discitis-osteomyelitis, if clinically indicated. Trace pleural effusions, new from prior. Anasarca. Decreased bladder wall thickening; correlate with urinalysis to evaluate for UTI. No evidence of pyelonephritis. - Continue Glycolax (polyethylene glycol) daily  - Continue Senokot nightly  - NM gastric emptying study today    Early Satiety  - Continue Protonix 40 mg before breakfast  - Pending emptying study   - NM gastric emptying study today  - Continue home Remeron 7.5 mg nightly      Hallucinations and Hypothermia   - CT head negative  - Continue Abilify 2 mg daily    ESRD  Pt able to make some urine.   - Nephrology following, appreciate input  - Dialysis (MWF)     HFrEF  KIARA (4/19/22) showed mildly reduced LV function. EF 40-45%. - Hold home Imdur 30 mg daily  - Hold home Bumex 2 mg Sun-Tue- Thur  - Hold home metoprolol 25 mg daily     CAD  S/p stent placement w/ balloon angioplasty (5/2022). (EKG 7/11/22) showed sinus bradycardia with first degree AV block compared to previous.   - Continue home ASA 81 mg daily  - Continue home Brilinta 90 mg BID     Chronic Vertebral Osteomyelitis  L1-L2. On IV vanc  and doxycycline.  - Continue vancomycin 1000 mg three times per week  - Continue doxycycline 100 mg BID     Hyperuricemia  - Continue home allopurinol 100 mg BID     HLD  - Continue home Lipitor 80 mg nightly     T2DM  With neuropathy. Home regimen: 5U Lantus QD  - LDSSI  - Gabapentin 200 mg TID     Anemia of CKD  NO BLOOD PRODUCTS (Catholic). - Nephrology following, appreciate input  - Continue JAYSHREE M/W/F, last dose 7/18/22     Secondary Hyperparathyroidism  Phosphorus 3.7.  Labs pending  - Nephrology following, appreciate input:  - Hold home Lanthanum 1000 mg three times weekly    Glaucoma  Timolol eyedrops discontinued  - Alphagan (brimonidine) instead BID  - latanoprost (Xalatan) eyedrops daily      DVT ppx: PCDs  GI ppx: Protonix, Glycolax, Senokot daily  Code Status: Full    Disposition: [facility] SNF[facility]   Family Medicine Resident PGY-2  07/21/22   7:33 AM

## 2022-07-21 NOTE — DISCHARGE SUMMARY
Physician Discharge Summary  [facility]     Patient ID:  Lenny Sommer  [id number]  78 y.o.  1955    Admit date: 7/11/2022    Discharge date: 7/21/22    Admission Diagnoses:   Hypotension [I95.9]  Hypotension, unspecified hypotension type [I95.9]    Discharge Diagnoses:  Principal Problem:    Hypotension  Active Problems:    Atherosclerosis of native coronary artery of native heart without angina pectoris    Vertebral osteomyelitis, chronic (HCC)    Symptomatic sinus bradycardia    Anemia, chronic disease    Constipation    Chronic diastolic CHF (congestive heart failure) ([facility] [address]    Controlled type 2 diabetes mellitus with chronic kidney disease on chronic dialysis, with long-term current use of insulin (HCC)    ESRD (end stage renal disease) on dialysis ([facility] [address]    Mixed hyperlipidemia    Lymphedema of right upper extremity  Resolved Problems:    * No resolved hospital problems. *      Consults: Nephrology, Cardiology, ICU  Procedures: R femoral central line    Hospital Course: Patient with PMH ESRD, T2DM, CAD with heart cath, HFrEF, and chronic vertebral osteomyelitis presented to the ED from [facility] with chief complaints of low BP and dizziness and was admitted for hypotension on 7/11/22. Pt remained hemodynamically unstable in ED and was transferred to ICU. In the ICU, the patient's hypotension was treated with IVF, started on pressors and dopamine and received albumin infusion. Pt transitioned to levophed and midodrine. Midodrine dose was doubled from home dose. A central line was placed for IV access. After 8 days in ICU, patient moved to intermediate unit where she stayed for 3 days. The patient's home insulin for T2DM was held due to hypoglycemia. The patient reported abdominal pain and after CT abdomen showed no relevant acute process was placed on a bowel regimen of Glycolax and Senokot.  A Ammon Hugger was used to increase patient's body temperature for hypothermia. An emptying study was completed due to patient's complaint of early satiety which demonstrated reflux and rapid early phase emptying related to diabetes but no evidence of gastroparesis. The patient expressed experiencing hallucinations at night which resolved with Abilify, no acute process shown on CT scan of head. Nephrology was consulted to help coordinate car for patient's ESRD, anemia of CKD, secondary hyperparathyroidism, and hemodialysis on MWF. Cardiology was consulted to help coordinate care for patient's HFrEF (home medicines were held) and hypotension. The patient received vancomycin and doxycycline for management of chronic vertebral osteomyelitis. Home medications for hyperuricemia and HLD were continued. Eyedrops for glaucoma were switched from timolol to brimonidine for concern of autonomic dysregulation. Patient was discharged to [facility] in a stable and improved condition. Significant Diagnostic Studies:   NM GASTRIC EMPTYING   Preliminary Result   Rapid early phase emptying which can be related to diabetes. No evidence of   gastroparesis. However, reflux noted at 1 hour. CT HEAD WO CONTRAST   Final Result   No acute intracranial abnormality. No significant interval change from previous exam with no evidence of   hemorrhage. CT ABDOMEN PELVIS W WO CONTRAST Additional Contrast? Oral   Final Result   Stable lumbar spine endplate erosions. This may represent dialysis related   spondyloarthropathy. MRI lumbar spine would better differentiate from   infectious discitis-osteomyelitis, if clinically indicated. Trace pleural effusions, new from prior. Anasarca. Decreased bladder wall thickening; correlate with urinalysis to evaluate for   UTI. No evidence of pyelonephritis. See other incidental findings above. XR CHEST PORTABLE   Final Result   No acute process. Post Discharge Follow up:  Follow up with PCP as needed    Medication changes: Start taking Abilify 2mg in morning, Glycolax packet in morning, Senokot nightly, and Alphagan eye drops in R eye twice daily. Stop taking Renvela, Vancocin, Imdur, Lantus, Toprol XL, Fosrenol, and Combigan eyedrops. Continue taking Proamatine 10 mg three times daily. Discharge Exam: See progress note from today    Disposition: [facility], confirmed Covid-19 NEGATIVE rapid test  Patient condition: Stable    Patient Instructions: Activity: Activity as tolerated  Diet: regular diet    Discharge Medication List:    Current Discharge Medication List        CONTINUE these medications which have NOT CHANGED    Details   acetaminophen (TYLENOL) 325 MG tablet Take 650 mg by mouth every 4 hours as needed for Pain      Acidophilus Lactobacillus CAPS Take 1 capsule by mouth every morning      aspirin 81 MG EC tablet Take 81 mg by mouth every morning      meclizine (ANTIVERT) 12.5 MG tablet Take 12.5 mg by mouth every 8 hours as needed for Dizziness      mirtazapine (REMERON) 7.5 MG tablet Take 7.5 mg by mouth nightly      oxyCODONE-acetaminophen (PERCOCET) 5-325 MG per tablet Take 2 tablets by mouth every 8 hours as needed for Pain.      prochlorperazine (COMPAZINE) 5 MG tablet Take 5 mg by mouth every 6 hours as needed (NAUSEA AND VOMITING)      ZINC OXIDE, TOPICAL, (SECURA PROTECTIVE) 10 % CREA Apply 1 Dose topically 2 times daily -BUTTOCKS-      Vancomycin HCl in Dextrose (VANCOCIN HCL IV) Infuse 1,000 mg intravenously every 72 hours **MON-WED-FRI**  -SEND TO DIALYSIS W/ PT-      doxycycline hyclate (VIBRAMYCIN) 100 MG capsule Take 1 capsule by mouth every 12 hours  Qty: 84 capsule, Refills: 0      gabapentin (NEURONTIN) 100 MG capsule Take 2 capsules by mouth 3 times daily for 180 days.   Qty: 180 capsule, Refills: 5    Associated Diagnoses: Medication refill      atorvastatin (LIPITOR) 80 MG tablet Take 80 mg by mouth nightly      bumetanide (BUMEX) 2 MG tablet Take 2 mg by mouth ophthalmic solution Comments:   Reason for Stopping:             Current Discharge Medication List        CONTINUE these medications which have CHANGED    Details   midodrine (PROAMATINE) 10 MG tablet Take 1 tablet by mouth in the morning and 1 tablet at noon and 1 tablet in the evening. Take with meals.   Qty: 90 tablet, Refills: 3               Follow up:  [address]     Call today  Call for follow up within 3-5 days of discharge, for hospital follow up    MD ALETA Weaver/ Servando Allen [address] [place] [address] Resident PGY-1  07/21/22   5:48 PM

## 2022-07-26 NOTE — PROGRESS NOTES
[facility], RN, [facility], transfer initiated to [facility]. Dx: lumbar stenosis    Dr. Hardeep Goodman (Dr. Donte Zhao) spoke with Dr. Katie Najera, neurosurgery. He requests transfer to see patient. 0953 Dr. Hardeep Goodman (Dr. Donte Zhao) spoke with Dr. Cathren Alpers, hospitalist, accepting provider. all other ROS negative except as per HPI

## 2022-07-28 PROBLEM — R77.8 ELEVATED TROPONIN: Status: RESOLVED | Noted: 2017-02-17 | Resolved: 2022-07-28

## 2022-07-28 PROBLEM — R79.89 ELEVATED TROPONIN: Status: RESOLVED | Noted: 2017-02-17 | Resolved: 2022-07-28

## 2022-07-28 PROBLEM — R52 PAIN: Status: RESOLVED | Noted: 2022-06-15 | Resolved: 2022-07-28

## 2022-07-31 ENCOUNTER — APPOINTMENT (OUTPATIENT)
Dept: CT IMAGING | Age: 67
End: 2022-07-31
Payer: COMMERCIAL

## 2022-07-31 ENCOUNTER — HOSPITAL ENCOUNTER (EMERGENCY)
Age: 67
Discharge: HOME | End: 2022-07-31
Attending: EMERGENCY MEDICINE
Payer: COMMERCIAL

## 2022-07-31 VITALS
BODY MASS INDEX: 28.63 KG/M2 | DIASTOLIC BLOOD PRESSURE: 65 MMHG | OXYGEN SATURATION: 98 % | WEIGHT: 200 LBS | HEART RATE: 60 BPM | HEIGHT: 70 IN | TEMPERATURE: 98.1 F | SYSTOLIC BLOOD PRESSURE: 120 MMHG | RESPIRATION RATE: 14 BRPM

## 2022-07-31 DIAGNOSIS — S00.03XA HEMATOMA OF SCALP, INITIAL ENCOUNTER: ICD-10-CM
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
## 2022-07-31 PROCEDURE — 99284 EMERGENCY DEPT VISIT MOD MDM: CPT

## 2022-07-31 PROCEDURE — 72125 CT NECK SPINE W/O DYE: CPT

## 2022-07-31 PROCEDURE — 70450 CT HEAD/BRAIN W/O DYE: CPT

## 2022-07-31 ASSESSMENT — ENCOUNTER SYMPTOMS
SHORTNESS OF BREATH: 0
VOMITING: 0
SINUS PAIN: 0
NAUSEA: 0
ABDOMINAL PAIN: 0
BACK PAIN: 0
DIARRHEA: 0
SORE THROAT: 0
EYE PAIN: 0

## 2022-07-31 ASSESSMENT — PAIN - FUNCTIONAL ASSESSMENT: PAIN_FUNCTIONAL_ASSESSMENT: 0-10

## 2022-07-31 ASSESSMENT — PAIN SCALES - GENERAL: PAINLEVEL_OUTOF10: 4

## 2022-07-31 ASSESSMENT — PAIN DESCRIPTION - LOCATION: LOCATION: HEAD

## 2022-07-31 ASSESSMENT — PAIN DESCRIPTION - ORIENTATION: ORIENTATION: LEFT

## 2022-07-31 ASSESSMENT — PAIN DESCRIPTION - PAIN TYPE: TYPE: ACUTE PAIN

## 2022-07-31 NOTE — ED NOTES
RN called report to Maisha Vera at [facility] to give report     Bib Brian.  Juan Elizalde RN  07/31/22 0739

## 2022-07-31 NOTE — ED NOTES
Again RN in room to round on patient. She doesn't want anything to drink or eat at this time. Denies the need to be toileted. Per pt, positioning satisfactory at this time. Call light within reach. Bed dry and clothing dry at this time. Patient was alert and oriented to all except location as she thought she was still at [employer].  Yohan Heath RN  07/31/22 [id number]

## 2022-07-31 NOTE — ED PROVIDER NOTES
Cardiovascular:      Rate and Rhythm: Normal rate and regular rhythm. Heart sounds: Normal heart sounds. No murmur heard. Pulmonary:      Effort: Pulmonary effort is normal.      Breath sounds: Normal breath sounds. Abdominal:      General: Bowel sounds are normal.      Palpations: Abdomen is soft. Tenderness: There is no abdominal tenderness. There is no guarding or rebound. Musculoskeletal:         General: No swelling. Cervical back: Normal range of motion and neck supple. Skin:     General: Skin is warm and dry. Comments: Small hematoma developing on left eyebrow   Neurological:      Mental Status: She is alert and oriented to person, place, and time. Motor: Weakness present. Comments: Chronic weakness        Procedures     MDM  Number of Diagnoses or Management Options  Fall, initial encounter  Hematoma of scalp, initial encounter  Diagnosis management comments: 43-year-old female past medical history of diabetes, ESRD, CAD, inability to ambulate presents after she fell. On arrival to emergency room she is hemodynamically able. Patient is at her baseline range of motion as well as mentation, physical exam reassuring as she is nontoxic and in no acute distress and is not demonstrating any midline spinal tenderness. Imaging did not demonstrate acute pathology and plan for discharge was discussed with nursing facility. Patient understanding of plan. ED Course as of 07/31/22 0934   Jamal Kent Jul 31, 2022   [id number] ATTENDING PROVIDER ATTESTATION:     I have personally performed and/or participated in the history, exam, medical decision making, and procedures and agree with all pertinent clinical information unless otherwise noted. I have also reviewed and agree with the past medical, family and social history unless otherwise noted.     I have discussed this patient in detail with the resident and provided the instruction and education regarding the evidence-based ---------------------------------------------  Past Medical History:  has a past medical history of Acute infection of bone ([facility] Acute osteomyelitis of phalanx of left hand ([facility] [address] Anemia of chronic disease, Arthritis, Breast cancer ([facility] [address] CAD (coronary artery disease), Carpal tunnel syndrome, Chronic diastolic CHF (congestive heart failure) ([facility] [address] CKD (chronic kidney disease) stage 4, GFR 15-29 ml/min ([facility] [address] [facility] Diabetic retinopathy ([facility] [facility] Glaucoma, Hemodialysis patient ([facility] [facility] Hyperkalemia, diminished renal excretion, Hyperlipidemia, Hypertension, Hypoglycemia unawareness in type 1 diabetes mellitus ([facility] [address] [facility] Insulin dependent type 2 diabetes mellitus ([facility] [address] [facility] Neuropathy, Osteomyelitis due to secondary diabetes [facility]), Patient is Jain, Refusal of blood product, Ventricular hypertrophy, Ventricular tachycardia ([facility] and Vitreous hemorrhage ([facility] [address] [facility] Past Surgical History:  has a past surgical history that includes Cholecystectomy; amputation; Mastectomy; Cystoscopy; Cataract removal; Ankle surgery; other surgical history (09/27/2011); ECHO Compl W Dop Color Flow (02/14/2013); Echo Complete (09/17/2013); spinal cord decompression; other surgical history (insertion lumbar drain insertion); other surgical history (10/22/2015); other surgical history (11/03/2015); Tunneled venous catheter placement (11/15/2017); Dialysis fistula creation (Left, 01/31/2018); vitrectomy (Left, 04/10/2018); pr rpr retinal dtchmnt w/vitrectomy any meth (Left, 04/10/2018); pr av anast,up arm basilic vein transposit (Left, 05/15/2018); pr ligatn angioaccess av fistula (Left, 09/25/2018); Colonoscopy; Carpal tunnel release (Left, 03/17/2020); transesophageal echocardiogram (11/11/2021); Cardiac catheterization (12/09/2021); Finger amputation (Left, 01/20/2022); bone biopsy (N/A, 04/18/2022); transesophageal echocardiogram (04/19/2022); Coronary angioplasty (05/05/2022); and Port Surgery (N/A, 6/27/2022).     Social History:  reports that she has never smoked. She has never used smokeless tobacco. She reports that she does not drink alcohol and does not use drugs. Family History: family history includes Breast Cancer (age of onset: 61) in her maternal grandmother and mother; Heart Disease in her father; Hypertension in her mother; Prostate Cancer in her father. The patients home medications have been reviewed. Allergies: Furosemide    -------------------------------------------------- RESULTS -------------------------------------------------  Labs:  No results found for this visit on 07/31/22. Radiology:  CT Head WO Contrast   Final Result   No acute intracranial abnormality. Small scalp hematoma overlying the left   orbit         CT Cervical Spine WO Contrast   Final Result   Multilevel degenerative changes seen within the cervical spine with no acute   abnormality of the cervical spine.             ------------------------- NURSING NOTES AND VITALS REVIEWED ---------------------------  Date / Time Roomed:  7/31/2022  5:27 AM  ED Bed Assignment:  19/19    The nursing notes within the ED encounter and vital signs as below have been reviewed. /60   Pulse 54   Temp 98.1 °F (36.7 °C) (Oral)   Resp 14   Ht 5' 10\" (1.778 m)   Wt 200 lb (90.7 kg)   SpO2 99%   BMI 28.70 kg/m²   Oxygen Saturation Interpretation: Normal      ------------------------------------------ PROGRESS NOTES ------------------------------------------  I have spoken with the patient and discussed todays results, in addition to providing specific details for the plan of care and counseling regarding the diagnosis and prognosis. Their questions are answered at this time and they are agreeable with the plan. I discussed at length with them reasons for immediate return here for re evaluation. They will followup with primary care by calling their office tomorrow.       --------------------------------- ADDITIONAL PROVIDER NOTES ---------------------------------  At this time the patient is without objective evidence of an acute process requiring hospitalization or inpatient management. They have remained hemodynamically stable throughout their entire ED visit and are stable for discharge with outpatient follow-up. The plan has been discussed in detail and they are aware of the specific conditions for emergent return, as well as the importance of follow-up. New Prescriptions    No medications on file       Diagnosis:  1. Fall, initial encounter    2. Hematoma of scalp, initial encounter        Disposition:  Patient's disposition: Discharge to home  Patient's condition is stable.            Melba Bojorquez DO  Resident  07/31/22 8818

## 2022-07-31 NOTE — ED NOTES
RN in to see patient. Patient clean and dry at this time. Refused any food or drink. Denies any other needs. Call light on side of bed. Edison Hunt.  KERRI Mendez  07/31/22 3352

## 2022-08-01 ENCOUNTER — HOSPITAL ENCOUNTER (INPATIENT)
Age: 67
LOS: 9 days | Discharge: ANOTHER HOSPITAL | DRG: 064 | End: 2022-08-10
Attending: EMERGENCY MEDICINE | Admitting: INTERNAL MEDICINE
Payer: COMMERCIAL

## 2022-08-01 ENCOUNTER — APPOINTMENT (OUTPATIENT)
Dept: CT IMAGING | Age: 67
DRG: 064 | End: 2022-08-01
Payer: COMMERCIAL

## 2022-08-01 ENCOUNTER — APPOINTMENT (OUTPATIENT)
Dept: GENERAL RADIOLOGY | Age: 67
DRG: 064 | End: 2022-08-01
Payer: COMMERCIAL

## 2022-08-01 DIAGNOSIS — N18.6 ESRD (END STAGE RENAL DISEASE) (HCC): ICD-10-CM

## 2022-08-01 DIAGNOSIS — I63.9 ACUTE CVA (CEREBROVASCULAR ACCIDENT) (HCC): Primary | ICD-10-CM

## 2022-08-01 PROBLEM — I63.81 STROKE, LACUNAR (HCC): Status: ACTIVE | Noted: 2022-08-01

## 2022-08-01 LAB
ALBUMIN SERPL-MCNC: 2.7 G/DL (ref 3.5–5.2)
ALP BLD-CCNC: 154 U/L (ref 35–104)
ALT SERPL-CCNC: 53 U/L (ref 0–32)
AMMONIA: <10 UMOL/L (ref 11–51)
ANION GAP SERPL CALCULATED.3IONS-SCNC: 11 MMOL/L (ref 7–16)
ANISOCYTOSIS: ABNORMAL
APTT: 45.6 SEC (ref 24.5–35.1)
AST SERPL-CCNC: 58 U/L (ref 0–31)
BASOPHILIC STIPPLING: ABNORMAL
BASOPHILS ABSOLUTE: 0 E9/L (ref 0–0.2)
BASOPHILS RELATIVE PERCENT: 0 % (ref 0–2)
BILIRUB SERPL-MCNC: 0.4 MG/DL (ref 0–1.2)
BUN BLDV-MCNC: 8 MG/DL (ref 6–23)
CALCIUM SERPL-MCNC: 8.7 MG/DL (ref 8.6–10.2)
CHLORIDE BLD-SCNC: 105 MMOL/L (ref 98–107)
CHP ED QC CHECK: NORMAL
CO2: 27 MMOL/L (ref 22–29)
CREAT SERPL-MCNC: 1.8 MG/DL (ref 0.5–1)
EKG Q-T INTERVAL: 526 MS
EKG QRS DURATION: 100 MS
EKG QTC CALCULATION (BAZETT): 503 MS
EKG R AXIS: 15 DEGREES
EKG T AXIS: -171 DEGREES
EKG VENTRICULAR RATE: 55 BPM
EOSINOPHILS ABSOLUTE: 0.02 E9/L (ref 0.05–0.5)
EOSINOPHILS RELATIVE PERCENT: 0.8 % (ref 0–6)
GFR AFRICAN AMERICAN: 34
GFR NON-AFRICAN AMERICAN: 34 ML/MIN/1.73
GLUCOSE BLD-MCNC: 78 MG/DL (ref 74–99)
HCT VFR BLD CALC: 25.4 % (ref 34–48)
HEMOGLOBIN: 8.1 G/DL (ref 11.5–15.5)
HYPOCHROMIA: ABNORMAL
IMMATURE GRANULOCYTES #: 0.02 E9/L
IMMATURE GRANULOCYTES %: 0.8 % (ref 0–5)
INR BLD: 1
LACTIC ACID, SEPSIS: 0.8 MMOL/L (ref 0.5–1.9)
LYMPHOCYTES ABSOLUTE: 0.5 E9/L (ref 1.5–4)
LYMPHOCYTES RELATIVE PERCENT: 19.8 % (ref 20–42)
MAGNESIUM: 1.9 MG/DL (ref 1.6–2.6)
MCH RBC QN AUTO: 27.9 PG (ref 26–35)
MCHC RBC AUTO-ENTMCNC: 31.9 % (ref 32–34.5)
MCV RBC AUTO: 87.6 FL (ref 80–99.9)
METER GLUCOSE: 74 MG/DL (ref 74–99)
METER GLUCOSE: 89 MG/DL (ref 74–99)
MONOCYTES ABSOLUTE: 0.22 E9/L (ref 0.1–0.95)
MONOCYTES RELATIVE PERCENT: 8.7 % (ref 2–12)
NEUTROPHILS ABSOLUTE: 1.76 E9/L (ref 1.8–7.3)
NEUTROPHILS RELATIVE PERCENT: 69.9 % (ref 43–80)
PDW BLD-RTO: 18 FL (ref 11.5–15)
PLATELET # BLD: 91 E9/L (ref 130–450)
PLATELET CONFIRMATION: NORMAL
PMV BLD AUTO: 12.1 FL (ref 7–12)
POTASSIUM REFLEX MAGNESIUM: 3 MMOL/L (ref 3.5–5)
PROTHROMBIN TIME: 11 SEC (ref 9.3–12.4)
RBC # BLD: 2.9 E12/L (ref 3.5–5.5)
SODIUM BLD-SCNC: 143 MMOL/L (ref 132–146)
TOTAL PROTEIN: 4.9 G/DL (ref 6.4–8.3)
WBC # BLD: 2.5 E9/L (ref 4.5–11.5)

## 2022-08-01 PROCEDURE — 85610 PROTHROMBIN TIME: CPT

## 2022-08-01 PROCEDURE — 6370000000 HC RX 637 (ALT 250 FOR IP): Performed by: FAMILY MEDICINE

## 2022-08-01 PROCEDURE — 87040 BLOOD CULTURE FOR BACTERIA: CPT

## 2022-08-01 PROCEDURE — 80053 COMPREHEN METABOLIC PANEL: CPT

## 2022-08-01 PROCEDURE — 82962 GLUCOSE BLOOD TEST: CPT

## 2022-08-01 PROCEDURE — 6370000000 HC RX 637 (ALT 250 FOR IP): Performed by: STUDENT IN AN ORGANIZED HEALTH CARE EDUCATION/TRAINING PROGRAM

## 2022-08-01 PROCEDURE — 83735 ASSAY OF MAGNESIUM: CPT

## 2022-08-01 PROCEDURE — 06HY33Z INSERTION OF INFUSION DEVICE INTO LOWER VEIN, PERCUTANEOUS APPROACH: ICD-10-PCS | Performed by: INTERNAL MEDICINE

## 2022-08-01 PROCEDURE — 6360000002 HC RX W HCPCS: Performed by: FAMILY MEDICINE

## 2022-08-01 PROCEDURE — 6370000000 HC RX 637 (ALT 250 FOR IP): Performed by: INTERNAL MEDICINE

## 2022-08-01 PROCEDURE — 2580000003 HC RX 258: Performed by: INTERNAL MEDICINE

## 2022-08-01 PROCEDURE — 70450 CT HEAD/BRAIN W/O DYE: CPT

## 2022-08-01 PROCEDURE — 85730 THROMBOPLASTIN TIME PARTIAL: CPT

## 2022-08-01 PROCEDURE — 82140 ASSAY OF AMMONIA: CPT

## 2022-08-01 PROCEDURE — 2060000000 HC ICU INTERMEDIATE R&B

## 2022-08-01 PROCEDURE — 71045 X-RAY EXAM CHEST 1 VIEW: CPT

## 2022-08-01 PROCEDURE — 85025 COMPLETE CBC W/AUTO DIFF WBC: CPT

## 2022-08-01 PROCEDURE — 93005 ELECTROCARDIOGRAM TRACING: CPT | Performed by: INTERNAL MEDICINE

## 2022-08-01 PROCEDURE — 99285 EMERGENCY DEPT VISIT HI MDM: CPT

## 2022-08-01 PROCEDURE — 93005 ELECTROCARDIOGRAM TRACING: CPT | Performed by: EMERGENCY MEDICINE

## 2022-08-01 PROCEDURE — 2580000003 HC RX 258: Performed by: FAMILY MEDICINE

## 2022-08-01 PROCEDURE — 83605 ASSAY OF LACTIC ACID: CPT

## 2022-08-01 RX ORDER — SERTRALINE HYDROCHLORIDE 25 MG/1
25 TABLET, FILM COATED ORAL DAILY
Status: DISCONTINUED | OUTPATIENT
Start: 2022-08-01 | End: 2022-08-11 | Stop reason: HOSPADM

## 2022-08-01 RX ORDER — SODIUM CHLORIDE 9 MG/ML
INJECTION, SOLUTION INTRAVENOUS PRN
Status: DISCONTINUED | OUTPATIENT
Start: 2022-08-01 | End: 2022-08-02 | Stop reason: SDUPTHER

## 2022-08-01 RX ORDER — ACETAMINOPHEN 325 MG/1
650 TABLET ORAL EVERY 4 HOURS PRN
Status: DISCONTINUED | OUTPATIENT
Start: 2022-08-01 | End: 2022-08-01 | Stop reason: ALTCHOICE

## 2022-08-01 RX ORDER — POTASSIUM CHLORIDE 7.45 MG/ML
10 INJECTION INTRAVENOUS PRN
Status: DISCONTINUED | OUTPATIENT
Start: 2022-08-01 | End: 2022-08-02

## 2022-08-01 RX ORDER — 0.9 % SODIUM CHLORIDE 0.9 %
500 INTRAVENOUS SOLUTION INTRAVENOUS ONCE
Status: COMPLETED | OUTPATIENT
Start: 2022-08-01 | End: 2022-08-01

## 2022-08-01 RX ORDER — 0.9 % SODIUM CHLORIDE 0.9 %
500 INTRAVENOUS SOLUTION INTRAVENOUS ONCE
Status: DISCONTINUED | OUTPATIENT
Start: 2022-08-01 | End: 2022-08-04

## 2022-08-01 RX ORDER — POTASSIUM CHLORIDE 20 MEQ/1
40 TABLET, EXTENDED RELEASE ORAL PRN
Status: DISCONTINUED | OUTPATIENT
Start: 2022-08-01 | End: 2022-08-02

## 2022-08-01 RX ORDER — ONDANSETRON 4 MG/1
4 TABLET, FILM COATED ORAL EVERY 6 HOURS PRN
Status: ON HOLD | COMMUNITY
End: 2022-08-10 | Stop reason: HOSPADM

## 2022-08-01 RX ORDER — MIDODRINE HYDROCHLORIDE 10 MG/1
10 TABLET ORAL
Status: DISCONTINUED | OUTPATIENT
Start: 2022-08-02 | End: 2022-08-03

## 2022-08-01 RX ORDER — LIDOCAINE AND PRILOCAINE 25; 25 MG/G; MG/G
1 CREAM TOPICAL SEE ADMIN INSTRUCTIONS
Status: DISCONTINUED | OUTPATIENT
Start: 2022-08-01 | End: 2022-08-11 | Stop reason: HOSPADM

## 2022-08-01 RX ORDER — SODIUM CHLORIDE 0.9 % (FLUSH) 0.9 %
10 SYRINGE (ML) INJECTION PRN
Status: DISCONTINUED | OUTPATIENT
Start: 2022-08-01 | End: 2022-08-02 | Stop reason: SDUPTHER

## 2022-08-01 RX ORDER — BUMETANIDE 1 MG/1
2 TABLET ORAL
Status: DISCONTINUED | OUTPATIENT
Start: 2022-08-02 | End: 2022-08-11 | Stop reason: HOSPADM

## 2022-08-01 RX ORDER — GABAPENTIN 100 MG/1
100 CAPSULE ORAL 3 TIMES DAILY
COMMUNITY

## 2022-08-01 RX ORDER — MIDODRINE HYDROCHLORIDE 10 MG/1
10 TABLET ORAL
Status: DISCONTINUED | OUTPATIENT
Start: 2022-08-01 | End: 2022-08-01

## 2022-08-01 RX ORDER — BISACODYL 10 MG
10 SUPPOSITORY, RECTAL RECTAL DAILY PRN
Status: ON HOLD | COMMUNITY
End: 2022-08-10 | Stop reason: HOSPADM

## 2022-08-01 RX ORDER — ACETAMINOPHEN 325 MG/1
650 TABLET ORAL EVERY 6 HOURS PRN
Status: DISCONTINUED | OUTPATIENT
Start: 2022-08-01 | End: 2022-08-02

## 2022-08-01 RX ORDER — ONDANSETRON 2 MG/ML
4 INJECTION INTRAMUSCULAR; INTRAVENOUS EVERY 6 HOURS PRN
Status: DISCONTINUED | OUTPATIENT
Start: 2022-08-01 | End: 2022-08-02

## 2022-08-01 RX ORDER — ASPIRIN 81 MG/1
81 TABLET ORAL EVERY MORNING
Status: DISCONTINUED | OUTPATIENT
Start: 2022-08-02 | End: 2022-08-04

## 2022-08-01 RX ORDER — ATORVASTATIN CALCIUM 40 MG/1
80 TABLET, FILM COATED ORAL NIGHTLY
Status: DISCONTINUED | OUTPATIENT
Start: 2022-08-01 | End: 2022-08-11 | Stop reason: HOSPADM

## 2022-08-01 RX ORDER — ONDANSETRON 4 MG/1
4 TABLET, ORALLY DISINTEGRATING ORAL EVERY 8 HOURS PRN
Status: DISCONTINUED | OUTPATIENT
Start: 2022-08-01 | End: 2022-08-02

## 2022-08-01 RX ORDER — POLYETHYLENE GLYCOL 3350 17 G/17G
17 POWDER, FOR SOLUTION ORAL DAILY PRN
Status: DISCONTINUED | OUTPATIENT
Start: 2022-08-01 | End: 2022-08-02

## 2022-08-01 RX ORDER — SODIUM CHLORIDE 0.9 % (FLUSH) 0.9 %
5-40 SYRINGE (ML) INJECTION EVERY 12 HOURS SCHEDULED
Status: DISCONTINUED | OUTPATIENT
Start: 2022-08-01 | End: 2022-08-02 | Stop reason: SDUPTHER

## 2022-08-01 RX ORDER — SERTRALINE HYDROCHLORIDE 25 MG/1
25 TABLET, FILM COATED ORAL DAILY
Status: ON HOLD | COMMUNITY
Start: 2022-07-26 | End: 2022-08-10 | Stop reason: HOSPADM

## 2022-08-01 RX ORDER — ENOXAPARIN SODIUM 100 MG/ML
40 INJECTION SUBCUTANEOUS DAILY
Status: DISCONTINUED | OUTPATIENT
Start: 2022-08-01 | End: 2022-08-01 | Stop reason: ALTCHOICE

## 2022-08-01 RX ORDER — ACETAMINOPHEN 650 MG/1
650 SUPPOSITORY RECTAL EVERY 6 HOURS PRN
Status: DISCONTINUED | OUTPATIENT
Start: 2022-08-01 | End: 2022-08-02

## 2022-08-01 RX ORDER — LATANOPROST 50 UG/ML
1 SOLUTION/ DROPS OPHTHALMIC NIGHTLY
Status: DISCONTINUED | OUTPATIENT
Start: 2022-08-01 | End: 2022-08-11 | Stop reason: HOSPADM

## 2022-08-01 RX ORDER — OXYCODONE HYDROCHLORIDE AND ACETAMINOPHEN 5; 325 MG/1; MG/1
2 TABLET ORAL EVERY 8 HOURS PRN
Status: DISCONTINUED | OUTPATIENT
Start: 2022-08-01 | End: 2022-08-11 | Stop reason: HOSPADM

## 2022-08-01 RX ORDER — HEPARIN SODIUM 10000 [USP'U]/ML
5000 INJECTION, SOLUTION INTRAVENOUS; SUBCUTANEOUS EVERY 8 HOURS
Status: DISCONTINUED | OUTPATIENT
Start: 2022-08-01 | End: 2022-08-11 | Stop reason: HOSPADM

## 2022-08-01 RX ORDER — GABAPENTIN 100 MG/1
100 CAPSULE ORAL 3 TIMES DAILY
Status: DISCONTINUED | OUTPATIENT
Start: 2022-08-01 | End: 2022-08-03

## 2022-08-01 RX ORDER — ATORVASTATIN CALCIUM 80 MG/1
80 TABLET, FILM COATED ORAL NIGHTLY
COMMUNITY

## 2022-08-01 RX ORDER — VANCOMYCIN 1 G/200ML
1000 INJECTION, SOLUTION INTRAVENOUS
Status: ON HOLD | COMMUNITY
End: 2022-08-10 | Stop reason: HOSPADM

## 2022-08-01 RX ORDER — ALLOPURINOL 100 MG/1
100 TABLET ORAL DAILY
Status: DISCONTINUED | OUTPATIENT
Start: 2022-08-01 | End: 2022-08-11 | Stop reason: HOSPADM

## 2022-08-01 RX ORDER — MIDODRINE HYDROCHLORIDE 10 MG/1
10 TABLET ORAL
Status: COMPLETED | OUTPATIENT
Start: 2022-08-01 | End: 2022-08-01

## 2022-08-01 RX ORDER — BRIMONIDINE TARTRATE 2 MG/ML
1 SOLUTION/ DROPS OPHTHALMIC 2 TIMES DAILY
Status: DISCONTINUED | OUTPATIENT
Start: 2022-08-01 | End: 2022-08-11 | Stop reason: HOSPADM

## 2022-08-01 RX ADMIN — ATORVASTATIN CALCIUM 80 MG: 40 TABLET, FILM COATED ORAL at 22:13

## 2022-08-01 RX ADMIN — SODIUM CHLORIDE, PRESERVATIVE FREE 10 ML: 5 INJECTION INTRAVENOUS at 22:29

## 2022-08-01 RX ADMIN — ALLOPURINOL 100 MG: 100 TABLET ORAL at 22:15

## 2022-08-01 RX ADMIN — HEPARIN SODIUM 5000 UNITS: 10000 INJECTION INTRAVENOUS; SUBCUTANEOUS at 22:22

## 2022-08-01 RX ADMIN — SODIUM CHLORIDE 500 ML: 9 INJECTION, SOLUTION INTRAVENOUS at 22:26

## 2022-08-01 RX ADMIN — BRIMONIDINE TARTRATE 1 DROP: 2 SOLUTION OPHTHALMIC at 22:15

## 2022-08-01 RX ADMIN — LATANOPROST 1 DROP: 50 SOLUTION OPHTHALMIC at 22:16

## 2022-08-01 RX ADMIN — TICAGRELOR 90 MG: 90 TABLET ORAL at 22:15

## 2022-08-01 RX ADMIN — GABAPENTIN 100 MG: 100 CAPSULE ORAL at 22:14

## 2022-08-01 RX ADMIN — SERTRALINE 25 MG: 50 TABLET, FILM COATED ORAL at 22:14

## 2022-08-01 RX ADMIN — PETROLATUM: 420 OINTMENT TOPICAL at 22:16

## 2022-08-01 RX ADMIN — MIDODRINE HYDROCHLORIDE 10 MG: 10 TABLET ORAL at 20:39

## 2022-08-01 ASSESSMENT — ENCOUNTER SYMPTOMS
SORE THROAT: 0
BACK PAIN: 0
SINUS PAIN: 0
EYE PAIN: 0
VOMITING: 0
DIARRHEA: 0
NAUSEA: 0
ABDOMINAL PAIN: 0
SHORTNESS OF BREATH: 0

## 2022-08-01 ASSESSMENT — PAIN SCALES - GENERAL: PAINLEVEL_OUTOF10: 0

## 2022-08-01 NOTE — LETTER
[place] Medicaid  CERTIFICATION OF NECESSITY  FOR NON-EMERGENCY TRANSPORTATION   BY GROUND AMBULANCE      Individual Information   1. Name: Geronimo Marino 2. [place] Medicaid Billing Number:    3. Address: [place] [address]      Transportation Provider Information   4. Provider Name: [facility]   5. [place] Provider Number:  National Provider Identifier (NPI):      Certification  7. Criteria:  During transport, this individual requires:  [x] Medical treatment or continuous     supervision by an EMT. [] The administration or regulation of oxygen by another person. [] Supervised protective restraint. 8. Period Beginning Date: 8/8/2022     9. Length  [x] Not more than 5 day(s)  [] One Year     Additional Information Relevant to Certification   10. Comments or Explanations, If Necessary or Appropriate     Lacunar Stroke, Confusion Alert and Oriented x2, Paraplegic     Certifying Practitioner Information   11. Name of Practitioner: Dr Jonathan Lee MD   12. [place] Medicaid Provider Number, If Applicable:  [address] Provider Identifier (NPI):      Signature Information   14. Date of Signature: 8/8/2022   15. Name of Person Signing: Electronically signed by Ash Baxter RN on 8/8/2022 at 9:39 AM     16. Signature and Professional Designation:   Electronically signed by Ash Baxter RN on 8/8/2022 at 9:39 AM       [place] [address]  Rev. 7/2015    [address] Encounter Date/Time: 8/1/2022 [place] Irene Account: [de-identified]    MRN: [id number]    Patient: Geronimo Marino    Contact Serial #: [id number]      ENCOUNTER          Patient Class: I Private Enc?   No Unit RM BD: SEYZ 8SE [facility] 8515/8515-B   Hospital Service: MED   Encounter DX: Stroke, lacunar ([facility] [I*   ADM Provider: Rodrigue Hussein MD   Procedure:     ATT Provider: Rodrigue Hussein MD   REF Provider:        Admission DX: Stroke, lacunar [facility]), Acute CVA (cerebrovascular accident) ([facility] [address] and DX codes: I60.80, I63.9      PATIENT                 Name: Christian Vee : 1955 (67 yrs)   Address: [facility], * Sex: Female   City: [address] [place] [address]         Marital Status: Single   Employer: DISABLED         [place]: [address]   Primary Care Provider: Uvaldo Isaac DO         Primary Phone: [phone]   EMERGENCY CONTACT   Contact Name Legal Guardian? Relationship to Patient Home Phone Work Phone   1. Pita Gaines  2. Lonia Pain      Brother/Sister  Brother/Sister [phone]              GUARANTOR            Guarantor: Christian Vee     : 1955   Address: [address] [place] [address] 1 Sex: Female     [place] [address]     Relation to Patient: Self       Home Phone: [phone]   Guarantor ID: [id number]       Work Phone:     Guarantor Employer: DISABLED         Status: DISABLED      COVERAGE        PRIMARY INSURANCE   Payor: [address] [place] [address] 62*   Payor Address: [facility], [address] [place] [address] Drive[address]       Group Number: [id number] Type: INDEMNITY   Subscriber Name: Ellen Klein : 1955   Subscriber ID: [id number] Pat. Rel. to Sub: Self   SECONDARY INSURANCE   Payor:   Plan:     Payor Address: ,          Group Number:   Insurance Type:     Subscriber Name:   Subscriber :     Subscriber ID:   Pat.  Rel. to Sub:

## 2022-08-01 NOTE — ED PROVIDER NOTES
Chief Complaint   Patient presents with    Altered Mental Status     LKW unknown. From dialysis, originally from [employer]. Sent in from dialysis d/t slow responses. Had full treatment. BGL 80       79-year-old female with past medical history of ESRD on HD MWF, diabetes, hyperlipidemia and hypertension presenting today with altered mental status. She is coming from dialysis today, she did have a full dialysis session. Dialysis nursing said that she was slow to respond and that is not her baseline. Nothing is any better or worse, not associated fevers, chills, chest pain. Her blood glucose was 88 when she was there. She is alert and oriented x4 but slow to respond here. No other acute complaints. She denies in the hospital for hypertension on 7/11. She does have chronic osteomyelitis. Review of Systems   Constitutional:  Negative for chills and fever. HENT:  Negative for ear pain, sinus pain and sore throat. Eyes:  Negative for pain. Respiratory:  Negative for shortness of breath. Cardiovascular:  Negative for chest pain. Gastrointestinal:  Negative for abdominal pain, diarrhea, nausea and vomiting. Genitourinary:  Negative for flank pain and pelvic pain. Musculoskeletal:  Negative for back pain and neck pain. Skin:  Negative for rash. Neurological:  Negative for seizures and headaches. Hematological:  Negative for adenopathy. All other systems reviewed and are negative. Physical Exam  Vitals and nursing note reviewed. Constitutional:       General: She is not in acute distress. Appearance: She is well-developed. She is ill-appearing. Comments: Chronically ill-appearing   HENT:      Head: Normocephalic and atraumatic. Right Ear: External ear normal.      Left Ear: External ear normal.      Nose: Nose normal.      Mouth/Throat:      Mouth: Mucous membranes are moist.      Pharynx: Oropharynx is clear.    Eyes:      Pupils: Pupils are equal, round, and reactive to light. Cardiovascular:      Rate and Rhythm: Normal rate and regular rhythm. Heart sounds: Normal heart sounds. No murmur heard. Pulmonary:      Effort: Pulmonary effort is normal. No respiratory distress. Breath sounds: Normal breath sounds. No wheezing. Abdominal:      General: Bowel sounds are normal.      Palpations: Abdomen is soft. Tenderness: There is no abdominal tenderness. There is no guarding or rebound. Musculoskeletal:         General: No swelling. Cervical back: Normal range of motion and neck supple. Skin:     General: Skin is warm and dry. Coloration: Skin is not pale. Neurological:      Mental Status: She is oriented to person, place, and time. She is lethargic. Procedures     EKG: This EKG is signed and interpreted by me. Rate: 55  Rhythm: Sinus  Interpretation: no acute changes, no acute ST elevations, normal axis, QTC is 503  Comparison: stable as compared to patient's most recent EKG      MDM  Number of Diagnoses or Management Options  Acute CVA (cerebrovascular accident) [facility])  Diagnosis management comments: 80-year-old female past medical history of ESRD on HD MWF, diabetes, hyperlipidemia and hypertension presenting today with altered mental status. On arrival to emergency room she was hemodynamically stable. Patient was able to answer orientation questions but was very slow to speak, paraplegic at baseline and no new focal deficits. EKG unremarkable with no acute ST elevations, lab work unremarkable with no metabolic explanation of her altered mental status. CT head showed concern for acute versus subacute lacunar infarct. With no specific last known well and without any specific chronicity, patient will be admitted for stroke evaluation. Lisbeht Hays accepted the patient for further evaluation and management.          ED Course as of 08/01/22 2012   Mon Aug 01, 2022   7958 Discussed with Marigene Kehr, admitting for Dr. Susan Cast, they will accept the patient. [MM]      ED Course User Index  [MM] Rebeca Breath, DO        ED Course as of 08/01/22 2012   Mon Aug 01, 2022   5758 Discussed with Pilar Bocanegra, admitting for Dr. Gunnar Knapp, they will accept the patient. [MM]      ED Course User Index  [MM] Rebeca Breath, DO       --------------------------------------------- PAST HISTORY ---------------------------------------------  Past Medical History:  has a past medical history of Acute infection of bone ([address] Acute osteomyelitis of phalanx of left hand ([address] Anemia of chronic disease, Arthritis, Breast cancer ([address] CAD (coronary artery disease), Carpal tunnel syndrome, Chronic diastolic CHF (congestive heart failure) ([address] CKD (chronic kidney disease) stage 4, GFR 15-29 ml/min ([address] Diabetic retinopathy ([address] Glaucoma, Hemodialysis patient ([address] Hyperkalemia, diminished renal excretion, Hyperlipidemia, Hypertension, Hypoglycemia unawareness in type 1 diabetes mellitus ([address] Insulin dependent type 2 diabetes mellitus ([address] Neuropathy, Osteomyelitis due to secondary diabetes [facility]), Patient is Yazidi, Refusal of blood product, Ventricular hypertrophy, Ventricular tachycardia ([address] and Vitreous hemorrhage ([address] Past Surgical History:  has a past surgical history that includes Cholecystectomy; amputation; Mastectomy; Cystoscopy; Cataract removal; Ankle surgery; other surgical history (09/27/2011); ECHO Compl W Dop Color Flow (02/14/2013); Echo Complete (09/17/2013); spinal cord decompression; other surgical history (insertion lumbar drain insertion); other surgical history (10/22/2015); other surgical history (11/03/2015); Tunneled venous catheter placement (11/15/2017);  Dialysis fistula creation (Left, 01/31/2018); vitrectomy (Left, 04/10/2018); pr rpr retinal dtchmnt w/vitrectomy any meth (Left, 04/10/2018); pr av anast,up arm basilic vein transposit (Left, 05/15/2018); pr ligatn angioaccess av fistula (Left, 09/25/2018); Colonoscopy; Carpal tunnel release (Left, 03/17/2020); transesophageal echocardiogram (11/11/2021); Cardiac catheterization (12/09/2021); Finger amputation (Left, 01/20/2022); bone biopsy (N/A, 04/18/2022); transesophageal echocardiogram (04/19/2022); Coronary angioplasty (05/05/2022); and Port Surgery (N/A, 6/27/2022). Social History:  reports that she has never smoked. She has never used smokeless tobacco. She reports that she does not drink alcohol and does not use drugs. Family History: family history includes Breast Cancer (age of onset: 61) in her maternal grandmother and mother; Heart Disease in her father; Hypertension in her mother; Prostate Cancer in her father. The patients home medications have been reviewed.     Allergies: Furosemide    -------------------------------------------------- RESULTS -------------------------------------------------    LABS:  Results for orders placed or performed during the hospital encounter of 08/01/22   Lactate, Sepsis   Result Value Ref Range    Lactic Acid, Sepsis 0.8 0.5 - 1.9 mmol/L   CBC with Auto Differential   Result Value Ref Range    WBC 2.5 (L) 4.5 - 11.5 E9/L    RBC 2.90 (L) 3.50 - 5.50 E12/L    Hemoglobin 8.1 (L) 11.5 - 15.5 g/dL    Hematocrit 25.4 (L) 34.0 - 48.0 %    MCV 87.6 80.0 - 99.9 fL    MCH 27.9 26.0 - 35.0 pg    MCHC 31.9 (L) 32.0 - 34.5 %    RDW 18.0 (H) 11.5 - 15.0 fL    Platelets 91 (L) 739 - 450 E9/L    MPV 12.1 (H) 7.0 - 12.0 fL    Neutrophils % 69.9 43.0 - 80.0 %    Immature Granulocytes % 0.8 0.0 - 5.0 %    Lymphocytes % 19.8 (L) 20.0 - 42.0 %    Monocytes % 8.7 2.0 - 12.0 %    Eosinophils % 0.8 0.0 - 6.0 %    Basophils % 0.0 0.0 - 2.0 %    Neutrophils Absolute 1.76 (L) 1.80 - 7.30 E9/L    Immature Granulocytes # 0.02 E9/L    Lymphocytes Absolute 0.50 (L) 1.50 - 4.00 E9/L    Monocytes Absolute 0.22 0.10 - 0.95 E9/L    Eosinophils Absolute 0.02 (L) 0.05 - 0.50 E9/L    Basophils Absolute 0.00 0.00 - 0.20 E9/L    Anisocytosis 1+ Hypochromia 1+     Basophilic Stippling 1+    Comprehensive Metabolic Panel w/ Reflex to MG   Result Value Ref Range    Sodium 143 132 - 146 mmol/L    Potassium reflex Magnesium 3.0 (L) 3.5 - 5.0 mmol/L    Chloride 105 98 - 107 mmol/L    CO2 27 22 - 29 mmol/L    Anion Gap 11 7 - 16 mmol/L    Glucose 78 74 - 99 mg/dL    BUN 8 6 - 23 mg/dL    Creatinine 1.8 (H) 0.5 - 1.0 mg/dL    GFR Non-African American 34 >=60 mL/min/1.73    GFR African American 34     Calcium 8.7 8.6 - 10.2 mg/dL    Total Protein 4.9 (L) 6.4 - 8.3 g/dL    Albumin 2.7 (L) 3.5 - 5.2 g/dL    Total Bilirubin 0.4 0.0 - 1.2 mg/dL    Alkaline Phosphatase 154 (H) 35 - 104 U/L    ALT 53 (H) 0 - 32 U/L    AST 58 (H) 0 - 31 U/L   APTT   Result Value Ref Range    aPTT 45.6 (H) 24.5 - 35.1 sec   Protime-INR   Result Value Ref Range    Protime 11.0 9.3 - 12.4 sec    INR 1.0    Ammonia   Result Value Ref Range    Ammonia <10.0 (L) 11.0 - 51.0 umol/L   Platelet Confirmation   Result Value Ref Range    Platelet Confirmation CONFIRMED    Magnesium   Result Value Ref Range    Magnesium 1.9 1.6 - 2.6 mg/dL   POCT Glucose   Result Value Ref Range    QC OK? y    POCT Glucose   Result Value Ref Range    Meter Glucose 74 74 - 99 mg/dL   POCT Glucose   Result Value Ref Range    Meter Glucose 89 74 - 99 mg/dL   EKG 12 lead   Result Value Ref Range    Ventricular Rate 55 BPM    QRS Duration 100 ms    Q-T Interval 526 ms    QTc Calculation (Bazett) 503 ms    R Axis 15 degrees    T Axis -171 degrees       RADIOLOGY:  CT Head WO Contrast   Final Result   Small hypodensity right jerardo not demonstrated previously. Acute or subacute   lacunar infarct at this location cannot be excluded. This finding could be   more fully evaluated with MRI. XR CHEST PORTABLE   Final Result   1. Small patchy opacity within the left lung base which could represent   atelectasis or pneumonia. Atelectasis is favored. 2. The right lung is clear.          MRI BRAIN WO CONTRAST    (Results Pending)     ------------------------- NURSING NOTES AND VITALS REVIEWED ---------------------------  Date / Time Roomed:  8/1/2022 11:42 AM  ED Bed Assignment:  8515/8515-B    The nursing notes within the ED encounter and vital signs as below have been reviewed. Patient Vitals for the past 24 hrs:   BP Temp Temp src Pulse Resp SpO2   08/01/22 2012 (!) 90/19 -- -- 52 -- 100 %   08/01/22 1800 (!) 98/52 97.4 °F (36.3 °C) Temporal 55 14 99 %   08/01/22 1445 105/86 98 °F (36.7 °C) -- 56 15 98 %   08/01/22 1430 -- -- -- 54 13 96 %   08/01/22 1142 134/87 97.9 °F (36.6 °C) -- 56 18 100 %       Oxygen Saturation Interpretation: Normal    ------------------------------------------ PROGRESS NOTES ------------------------------------------  Re-evaluation(s):  Time: 1430  Patients symptoms show no change  Repeat physical examination is not changed    Counseling:  I have spoken with the patient and discussed todays results, in addition to providing specific details for the plan of care and counseling regarding the diagnosis and prognosis. Their questions are answered at this time and they are agreeable with the plan of admission.    --------------------------------- ADDITIONAL PROVIDER NOTES ---------------------------------  Consultations:  Time: 5727. Spoke with Pilar Bocanegra admitting for Dr. Gunnar Knapp. Discussed case. They will admit the patient. This patient's ED course included: a personal history and physicial examination, re-evaluation prior to disposition, multiple bedside re-evaluations, IV medications, cardiac monitoring, continuous pulse oximetry, and complex medical decision making and emergency management    This patient has remained hemodynamically stable during their ED course. Diagnosis:  1. Acute CVA (cerebrovascular accident) [facility])        Disposition:  Patient's disposition: Admit to telemetry  Patient's condition is stable.            Rebeca Luciano DO  Resident  08/01/22 2012

## 2022-08-01 NOTE — ED NOTES
Patient passed swallow exam, was able to drink 8oz of apple juice BGL was [address] [place], RN  08/01/22 3463

## 2022-08-01 NOTE — PROGRESS NOTES
This patient is on medication that requires renal, weight, and/or indication dose adjustment. Date Body Weight IBW  Adjusted BW SCr  CrCl Dialysis status   8/1/2022   Ideal body weight: 68.5 kg (151 lb 0.2 oz)  Adjusted ideal body weight: 77.4 kg (170 lb 9.7 oz) Serum creatinine: 1.8 mg/dL (H) 08/01/22 1243  Estimated creatinine clearance: 38 mL/min (A) HD       Pharmacy has dose-adjusted the following medication(s):    Date Previous Order Adjusted Order   8/1/2022 Enoxaparin 40 mg Daily Heparin 5,000 units SUBQ TID       These changes were made per protocol according to the [facility] Clinical Guidance for Pharmacists. *Please note this dose may need readjusted if patient's condition changes. Please contact pharmacy with any questions regarding these changes.     Roderick France [facility]  8/1/2022  7:09 PM

## 2022-08-02 ENCOUNTER — APPOINTMENT (OUTPATIENT)
Dept: GENERAL RADIOLOGY | Age: 67
DRG: 064 | End: 2022-08-02
Payer: COMMERCIAL

## 2022-08-02 LAB
ALBUMIN SERPL-MCNC: 2.8 G/DL (ref 3.5–5.2)
ALP BLD-CCNC: 161 U/L (ref 35–104)
ALT SERPL-CCNC: 62 U/L (ref 0–32)
ANION GAP SERPL CALCULATED.3IONS-SCNC: 10 MMOL/L (ref 7–16)
ANION GAP SERPL CALCULATED.3IONS-SCNC: 10 MMOL/L (ref 7–16)
ANION GAP SERPL CALCULATED.3IONS-SCNC: 8 MMOL/L (ref 7–16)
ANISOCYTOSIS: ABNORMAL
AST SERPL-CCNC: 65 U/L (ref 0–31)
BASOPHILIC STIPPLING: ABNORMAL
BASOPHILIC STIPPLING: ABNORMAL
BASOPHILS ABSOLUTE: 0 E9/L (ref 0–0.2)
BASOPHILS RELATIVE PERCENT: 0 % (ref 0–2)
BILIRUB SERPL-MCNC: 0.3 MG/DL (ref 0–1.2)
BUN BLDV-MCNC: 11 MG/DL (ref 6–23)
BUN BLDV-MCNC: 8 MG/DL (ref 6–23)
BUN BLDV-MCNC: 9 MG/DL (ref 6–23)
BURR CELLS: ABNORMAL
BURR CELLS: ABNORMAL
CALCIUM SERPL-MCNC: 8.1 MG/DL (ref 8.6–10.2)
CALCIUM SERPL-MCNC: 8.6 MG/DL (ref 8.6–10.2)
CALCIUM SERPL-MCNC: 8.7 MG/DL (ref 8.6–10.2)
CHLORIDE BLD-SCNC: 101 MMOL/L (ref 98–107)
CHLORIDE BLD-SCNC: 105 MMOL/L (ref 98–107)
CHLORIDE BLD-SCNC: 105 MMOL/L (ref 98–107)
CHOLESTEROL, TOTAL: 92 MG/DL (ref 0–199)
CO2: 26 MMOL/L (ref 22–29)
CO2: 26 MMOL/L (ref 22–29)
CO2: 28 MMOL/L (ref 22–29)
CREAT SERPL-MCNC: 1.9 MG/DL (ref 0.5–1)
CREAT SERPL-MCNC: 2 MG/DL (ref 0.5–1)
CREAT SERPL-MCNC: 2.2 MG/DL (ref 0.5–1)
EKG ATRIAL RATE: 51 BPM
EKG P AXIS: 39 DEGREES
EKG P-R INTERVAL: 212 MS
EKG Q-T INTERVAL: 534 MS
EKG QRS DURATION: 118 MS
EKG QTC CALCULATION (BAZETT): 492 MS
EKG R AXIS: 11 DEGREES
EKG T AXIS: 180 DEGREES
EKG VENTRICULAR RATE: 51 BPM
EOSINOPHILS ABSOLUTE: 0 E9/L (ref 0.05–0.5)
EOSINOPHILS ABSOLUTE: 0.01 E9/L (ref 0.05–0.5)
EOSINOPHILS ABSOLUTE: 0.03 E9/L (ref 0.05–0.5)
EOSINOPHILS RELATIVE PERCENT: 0.3 % (ref 0–6)
EOSINOPHILS RELATIVE PERCENT: 0.4 % (ref 0–6)
EOSINOPHILS RELATIVE PERCENT: 0.9 % (ref 0–6)
GFR AFRICAN AMERICAN: 27
GFR AFRICAN AMERICAN: 30
GFR AFRICAN AMERICAN: 32
GFR NON-AFRICAN AMERICAN: 27 ML/MIN/1.73
GFR NON-AFRICAN AMERICAN: 30 ML/MIN/1.73
GFR NON-AFRICAN AMERICAN: 32 ML/MIN/1.73
GLUCOSE BLD-MCNC: 131 MG/DL (ref 74–99)
GLUCOSE BLD-MCNC: 70 MG/DL (ref 74–99)
GLUCOSE BLD-MCNC: 94 MG/DL (ref 74–99)
HBA1C MFR BLD: 4.9 % (ref 4–5.6)
HCT VFR BLD CALC: 24.4 % (ref 34–48)
HCT VFR BLD CALC: 25.3 % (ref 34–48)
HCT VFR BLD CALC: 26.8 % (ref 34–48)
HDLC SERPL-MCNC: 55 MG/DL
HEMOGLOBIN: 7.6 G/DL (ref 11.5–15.5)
HEMOGLOBIN: 7.8 G/DL (ref 11.5–15.5)
HEMOGLOBIN: 8.3 G/DL (ref 11.5–15.5)
HYPOCHROMIA: ABNORMAL
IMMATURE GRANULOCYTES #: 0.02 E9/L
IMMATURE GRANULOCYTES %: 0.7 % (ref 0–5)
LACTIC ACID: 0.6 MMOL/L (ref 0.5–2.2)
LACTIC ACID: 0.6 MMOL/L (ref 0.5–2.2)
LDL CHOLESTEROL CALCULATED: 24 MG/DL (ref 0–99)
LYMPHOCYTES ABSOLUTE: 0.11 E9/L (ref 1.5–4)
LYMPHOCYTES ABSOLUTE: 0.14 E9/L (ref 1.5–4)
LYMPHOCYTES ABSOLUTE: 0.5 E9/L (ref 1.5–4)
LYMPHOCYTES RELATIVE PERCENT: 16.9 % (ref 20–42)
LYMPHOCYTES RELATIVE PERCENT: 3.5 % (ref 20–42)
LYMPHOCYTES RELATIVE PERCENT: 3.5 % (ref 20–42)
MAGNESIUM: 1.6 MG/DL (ref 1.6–2.6)
MAGNESIUM: 1.8 MG/DL (ref 1.6–2.6)
MAGNESIUM: 1.8 MG/DL (ref 1.6–2.6)
MCH RBC QN AUTO: 27.1 PG (ref 26–35)
MCH RBC QN AUTO: 27.2 PG (ref 26–35)
MCH RBC QN AUTO: 27.2 PG (ref 26–35)
MCHC RBC AUTO-ENTMCNC: 30.8 % (ref 32–34.5)
MCHC RBC AUTO-ENTMCNC: 31 % (ref 32–34.5)
MCHC RBC AUTO-ENTMCNC: 31.1 % (ref 32–34.5)
MCV RBC AUTO: 87.1 FL (ref 80–99.9)
MCV RBC AUTO: 87.9 FL (ref 80–99.9)
MCV RBC AUTO: 88.2 FL (ref 80–99.9)
METER GLUCOSE: 70 MG/DL (ref 74–99)
METER GLUCOSE: 85 MG/DL (ref 74–99)
METER GLUCOSE: 88 MG/DL (ref 74–99)
MONOCYTES ABSOLUTE: 0.03 E9/L (ref 0.1–0.95)
MONOCYTES ABSOLUTE: 0.11 E9/L (ref 0.1–0.95)
MONOCYTES ABSOLUTE: 0.23 E9/L (ref 0.1–0.95)
MONOCYTES RELATIVE PERCENT: 0.9 % (ref 2–12)
MONOCYTES RELATIVE PERCENT: 2.6 % (ref 2–12)
MONOCYTES RELATIVE PERCENT: 7.8 % (ref 2–12)
NEUTROPHILS ABSOLUTE: 2.2 E9/L (ref 1.8–7.3)
NEUTROPHILS ABSOLUTE: 2.59 E9/L (ref 1.8–7.3)
NEUTROPHILS ABSOLUTE: 3.35 E9/L (ref 1.8–7.3)
NEUTROPHILS RELATIVE PERCENT: 74.3 % (ref 43–80)
NEUTROPHILS RELATIVE PERCENT: 93 % (ref 43–80)
NEUTROPHILS RELATIVE PERCENT: 95.6 % (ref 43–80)
NUCLEATED RED BLOOD CELLS: 2.6 /100 WBC
NUCLEATED RED BLOOD CELLS: 4.3 /100 WBC
OVALOCYTES: ABNORMAL
PDW BLD-RTO: 17.9 FL (ref 11.5–15)
PDW BLD-RTO: 18 FL (ref 11.5–15)
PDW BLD-RTO: 18 FL (ref 11.5–15)
PHOSPHORUS: 2.6 MG/DL (ref 2.5–4.5)
PHOSPHORUS: 3 MG/DL (ref 2.5–4.5)
PLATELET # BLD: 72 E9/L (ref 130–450)
PLATELET # BLD: 81 E9/L (ref 130–450)
PLATELET # BLD: 91 E9/L (ref 130–450)
PLATELET CONFIRMATION: NORMAL
PMV BLD AUTO: 12.4 FL (ref 7–12)
PMV BLD AUTO: 12.6 FL (ref 7–12)
PMV BLD AUTO: ABNORMAL FL (ref 7–12)
POIKILOCYTES: ABNORMAL
POIKILOCYTES: ABNORMAL
POLYCHROMASIA: ABNORMAL
POLYCHROMASIA: ABNORMAL
POTASSIUM REFLEX MAGNESIUM: 3.1 MMOL/L (ref 3.5–5)
POTASSIUM SERPL-SCNC: 3.2 MMOL/L (ref 3.5–5)
POTASSIUM SERPL-SCNC: 3.3 MMOL/L (ref 3.5–5)
PROCALCITONIN: 0.27 NG/ML (ref 0–0.08)
PROCALCITONIN: 0.28 NG/ML (ref 0–0.08)
RBC # BLD: 2.8 E12/L (ref 3.5–5.5)
RBC # BLD: 2.87 E12/L (ref 3.5–5.5)
RBC # BLD: 3.05 E12/L (ref 3.5–5.5)
SCHISTOCYTES: ABNORMAL
SODIUM BLD-SCNC: 139 MMOL/L (ref 132–146)
SODIUM BLD-SCNC: 139 MMOL/L (ref 132–146)
SODIUM BLD-SCNC: 141 MMOL/L (ref 132–146)
TARGET CELLS: ABNORMAL
TEAR DROP CELLS: ABNORMAL
TEAR DROP CELLS: ABNORMAL
TOTAL PROTEIN: 5.1 G/DL (ref 6.4–8.3)
TRIGL SERPL-MCNC: 66 MG/DL (ref 0–149)
VLDLC SERPL CALC-MCNC: 13 MG/DL
WBC # BLD: 2.7 E9/L (ref 4.5–11.5)
WBC # BLD: 3 E9/L (ref 4.5–11.5)
WBC # BLD: 3.6 E9/L (ref 4.5–11.5)

## 2022-08-02 PROCEDURE — 36415 COLL VENOUS BLD VENIPUNCTURE: CPT

## 2022-08-02 PROCEDURE — 2000000000 HC ICU R&B

## 2022-08-02 PROCEDURE — 6360000002 HC RX W HCPCS: Performed by: INTERNAL MEDICINE

## 2022-08-02 PROCEDURE — 97165 OT EVAL LOW COMPLEX 30 MIN: CPT

## 2022-08-02 PROCEDURE — 6360000002 HC RX W HCPCS: Performed by: FAMILY MEDICINE

## 2022-08-02 PROCEDURE — 92526 ORAL FUNCTION THERAPY: CPT | Performed by: SPEECH-LANGUAGE PATHOLOGIST

## 2022-08-02 PROCEDURE — 97162 PT EVAL MOD COMPLEX 30 MIN: CPT

## 2022-08-02 PROCEDURE — 80048 BASIC METABOLIC PNL TOTAL CA: CPT

## 2022-08-02 PROCEDURE — 83735 ASSAY OF MAGNESIUM: CPT

## 2022-08-02 PROCEDURE — 97530 THERAPEUTIC ACTIVITIES: CPT

## 2022-08-02 PROCEDURE — 2580000003 HC RX 258: Performed by: FAMILY MEDICINE

## 2022-08-02 PROCEDURE — 80061 LIPID PANEL: CPT

## 2022-08-02 PROCEDURE — 36592 COLLECT BLOOD FROM PICC: CPT

## 2022-08-02 PROCEDURE — 71045 X-RAY EXAM CHEST 1 VIEW: CPT

## 2022-08-02 PROCEDURE — 6370000000 HC RX 637 (ALT 250 FOR IP): Performed by: STUDENT IN AN ORGANIZED HEALTH CARE EDUCATION/TRAINING PROGRAM

## 2022-08-02 PROCEDURE — 92610 EVALUATE SWALLOWING FUNCTION: CPT | Performed by: SPEECH-LANGUAGE PATHOLOGIST

## 2022-08-02 PROCEDURE — 82962 GLUCOSE BLOOD TEST: CPT

## 2022-08-02 PROCEDURE — 83605 ASSAY OF LACTIC ACID: CPT

## 2022-08-02 PROCEDURE — 84100 ASSAY OF PHOSPHORUS: CPT

## 2022-08-02 PROCEDURE — 80053 COMPREHEN METABOLIC PANEL: CPT

## 2022-08-02 PROCEDURE — 84145 PROCALCITONIN (PCT): CPT

## 2022-08-02 PROCEDURE — 85025 COMPLETE CBC W/AUTO DIFF WBC: CPT

## 2022-08-02 PROCEDURE — 83036 HEMOGLOBIN GLYCOSYLATED A1C: CPT

## 2022-08-02 PROCEDURE — 6370000000 HC RX 637 (ALT 250 FOR IP): Performed by: FAMILY MEDICINE

## 2022-08-02 PROCEDURE — 6370000000 HC RX 637 (ALT 250 FOR IP): Performed by: INTERNAL MEDICINE

## 2022-08-02 PROCEDURE — 97535 SELF CARE MNGMENT TRAINING: CPT

## 2022-08-02 RX ORDER — 0.9 % SODIUM CHLORIDE 0.9 %
250 INTRAVENOUS SOLUTION INTRAVENOUS ONCE
Status: DISCONTINUED | OUTPATIENT
Start: 2022-08-02 | End: 2022-08-04

## 2022-08-02 RX ORDER — ACETAMINOPHEN 650 MG/1
650 SUPPOSITORY RECTAL EVERY 6 HOURS PRN
Status: DISCONTINUED | OUTPATIENT
Start: 2022-08-02 | End: 2022-08-11 | Stop reason: HOSPADM

## 2022-08-02 RX ORDER — SODIUM CHLORIDE 9 MG/ML
INJECTION, SOLUTION INTRAVENOUS CONTINUOUS
Status: DISCONTINUED | OUTPATIENT
Start: 2022-08-02 | End: 2022-08-02

## 2022-08-02 RX ORDER — SODIUM CHLORIDE 0.9 % (FLUSH) 0.9 %
5-40 SYRINGE (ML) INJECTION EVERY 12 HOURS SCHEDULED
Status: DISCONTINUED | OUTPATIENT
Start: 2022-08-02 | End: 2022-08-11 | Stop reason: HOSPADM

## 2022-08-02 RX ORDER — ACETAMINOPHEN 325 MG/1
650 TABLET ORAL EVERY 6 HOURS PRN
Status: DISCONTINUED | OUTPATIENT
Start: 2022-08-02 | End: 2022-08-11 | Stop reason: HOSPADM

## 2022-08-02 RX ORDER — POLYETHYLENE GLYCOL 3350 17 G/17G
17 POWDER, FOR SOLUTION ORAL DAILY PRN
Status: DISCONTINUED | OUTPATIENT
Start: 2022-08-02 | End: 2022-08-11 | Stop reason: HOSPADM

## 2022-08-02 RX ORDER — SODIUM CHLORIDE 0.9 % (FLUSH) 0.9 %
5-40 SYRINGE (ML) INJECTION PRN
Status: DISCONTINUED | OUTPATIENT
Start: 2022-08-02 | End: 2022-08-11 | Stop reason: HOSPADM

## 2022-08-02 RX ORDER — ONDANSETRON 4 MG/1
4 TABLET, ORALLY DISINTEGRATING ORAL EVERY 8 HOURS PRN
Status: DISCONTINUED | OUTPATIENT
Start: 2022-08-02 | End: 2022-08-11 | Stop reason: HOSPADM

## 2022-08-02 RX ORDER — ONDANSETRON 2 MG/ML
4 INJECTION INTRAMUSCULAR; INTRAVENOUS EVERY 6 HOURS PRN
Status: DISCONTINUED | OUTPATIENT
Start: 2022-08-02 | End: 2022-08-11 | Stop reason: HOSPADM

## 2022-08-02 RX ORDER — SODIUM CHLORIDE 9 MG/ML
INJECTION, SOLUTION INTRAVENOUS PRN
Status: DISCONTINUED | OUTPATIENT
Start: 2022-08-02 | End: 2022-08-11 | Stop reason: HOSPADM

## 2022-08-02 RX ORDER — POTASSIUM CHLORIDE 29.8 MG/ML
40 INJECTION INTRAVENOUS ONCE
Status: COMPLETED | OUTPATIENT
Start: 2022-08-02 | End: 2022-08-02

## 2022-08-02 RX ORDER — DEXTROSE MONOHYDRATE 25 G/50ML
INJECTION, SOLUTION INTRAVENOUS
Status: DISPENSED
Start: 2022-08-02 | End: 2022-08-03

## 2022-08-02 RX ADMIN — ATORVASTATIN CALCIUM 80 MG: 40 TABLET, FILM COATED ORAL at 20:29

## 2022-08-02 RX ADMIN — MIDODRINE HYDROCHLORIDE 10 MG: 10 TABLET ORAL at 08:39

## 2022-08-02 RX ADMIN — GABAPENTIN 100 MG: 100 CAPSULE ORAL at 20:29

## 2022-08-02 RX ADMIN — TICAGRELOR 90 MG: 90 TABLET ORAL at 08:45

## 2022-08-02 RX ADMIN — SODIUM CHLORIDE: 9 INJECTION, SOLUTION INTRAVENOUS at 17:00

## 2022-08-02 RX ADMIN — ALLOPURINOL 100 MG: 100 TABLET ORAL at 08:38

## 2022-08-02 RX ADMIN — ASPIRIN 81 MG: 81 TABLET, COATED ORAL at 08:39

## 2022-08-02 RX ADMIN — LATANOPROST 1 DROP: 50 SOLUTION OPHTHALMIC at 21:07

## 2022-08-02 RX ADMIN — POTASSIUM CHLORIDE 40 MEQ: 29.8 INJECTION, SOLUTION INTRAVENOUS at 19:54

## 2022-08-02 RX ADMIN — BRIMONIDINE TARTRATE 1 DROP: 2 SOLUTION OPHTHALMIC at 08:42

## 2022-08-02 RX ADMIN — PETROLATUM: 420 OINTMENT TOPICAL at 08:43

## 2022-08-02 RX ADMIN — PETROLATUM: 420 OINTMENT TOPICAL at 21:07

## 2022-08-02 RX ADMIN — SERTRALINE 25 MG: 50 TABLET, FILM COATED ORAL at 08:38

## 2022-08-02 RX ADMIN — BRIMONIDINE TARTRATE 1 DROP: 2 SOLUTION OPHTHALMIC at 21:07

## 2022-08-02 RX ADMIN — SODIUM CHLORIDE, PRESERVATIVE FREE 10 ML: 5 INJECTION INTRAVENOUS at 09:00

## 2022-08-02 RX ADMIN — TICAGRELOR 90 MG: 90 TABLET ORAL at 20:29

## 2022-08-02 RX ADMIN — MIDODRINE HYDROCHLORIDE 10 MG: 10 TABLET ORAL at 11:40

## 2022-08-02 RX ADMIN — HEPARIN SODIUM 5000 UNITS: 10000 INJECTION INTRAVENOUS; SUBCUTANEOUS at 06:25

## 2022-08-02 RX ADMIN — GABAPENTIN 100 MG: 100 CAPSULE ORAL at 08:39

## 2022-08-02 ASSESSMENT — PAIN SCALES - GENERAL
PAINLEVEL_OUTOF10: 0
PAINLEVEL_OUTOF10: 0

## 2022-08-02 NOTE — PROCEDURES
Central Line Insertion     Procedure: left femoral vein Triple Lumen Catheter placement. Indications: poor peripheral access, hypotension    Consent: The patient provided verbal consent for this procedure. Number of sticks: 1    Number of Kits used: 1    Procedure: Time Out: Immediately prior to the procedure a \"timeout\" was called to verify the correct patient and procedure. The patient was place in the trendelenburg position and the skin over the left femoral vein was prepped with chlorhexidine. Local anesthesia was obtained by infiltration using 2% Lidocaine without epinephrine. With Ultrasound guidance a large bore needle was used to identify the vein, dark non pulsatile blood returned. The guide wire was then inserted through the needle with minimal resistance. 2 mm nick was made in the skin beside the guidewire. Then a dilator was inserted and removed. A triple lumen catheter was then inserted into the vessel over the guide wire using the Seldinger technique to the 15 cm cliff. All ports showed good, free flowing blood return and were flushed with saline solution. The catheter was then securely fastened to the skin with sutures and covered with a bio patch and sterile dressing. A post procedure X-ray was not indicated. Complications: None   The patient tolerated the procedure well. Estimated blood loss: 1 ml.     Crystal Marni MD PGY-3  8/2/2022  8:07 AM

## 2022-08-02 NOTE — PROGRESS NOTES
RN notified BIBI Galan pt HR sustaining mid 40s/50s and is now NPO for failed speech swallow eval and until she gets the barrium swallow she will not be able to to her midodrine.

## 2022-08-02 NOTE — PROGRESS NOTES
RN attempted to complete pts MRI checklist, however pt states her pain pump is not compatible with the MRI machine.

## 2022-08-02 NOTE — SIGNIFICANT EVENT
Critical Care - Rapid Response Team Note      Date of event: 8/2/2022   Time of event: 2003    Kath Bledsoe 78y.o. year old female   YOB: 1955     PCP:  Gwendolynn Kayser, DO   Location: [address] [place] [address]   Witnessed? : []Yes  [] No  Initial Code status: [] Full  [] DNR-CCA  []DNR-CC    []Limited  ______________________________________________________________________  Reason for RRT:   Other: Hypotension    Chief Complaint for this admission:   Chief Complaint   Patient presents with    Altered Mental Status     LKW unknown. From dialysis, originally from [email or web address]. Sent in from dialysis d/t slow responses. Had full treatment. BGL 88        Admit date:  8/1/2022     Admitting Diagnosis: Stroke, lacunar (HCC) [I63.81]  Acute CVA (cerebrovascular accident) ([facility] [I63.9]      Current Diagnosis: The encounter diagnosis was Acute CVA (cerebrovascular accident) ([address] Subjective: 79-year-old female with past medical history of ESRD on dialysis, type 2 diabetes mellitus, CAD with heart failure, HFpEF, chronic vertebral osteomyelitis presented to the ED for altered mental status. RRT was called for hypotension. On arrival, patient's blood pressure was 90/19 with a MAP of 42, heart rate 52, patient was saturating 100%. Patient was alert, awake, oriented following all the commands. No neurological deficit was found. Patient denied any chest pain, shortness of breath, palpitation, lightheadedness, dizziness. Patient also denied any fever, cough, chill or any other signs symptoms concerning for infection. Patient mention patient is very tired. Repeat blood pressure was 76/39. Patient did not received her scheduled midodrine and also received dialysis today. High 500 cc IV fluid bolus ordered, patient's IV infiltrated in her right arm and there was no access. Repeat blood pressure showed 87/66, patient was received her scheduled midodrine 10 mg. Repeat blood pressure 80/42.   Multiple attempts failed to get peripheral IV access. Central line placed. Bumex has been held during RRT. Repeat CBC, CMP, lactic acid, mag, Phos, procalcitonin ordered. Labs since last 72 hours:   CBC:   Lab Results   Component Value Date/Time    WBC 2.5 08/01/2022 12:43 PM    RBC 2.90 08/01/2022 12:43 PM    HGB 8.1 08/01/2022 12:43 PM    HCT 25.4 08/01/2022 12:43 PM    MCV 87.6 08/01/2022 12:43 PM    MCH 27.9 08/01/2022 12:43 PM    MCHC 31.9 08/01/2022 12:43 PM    RDW 18.0 08/01/2022 12:43 PM    PLT 91 08/01/2022 12:43 PM    MPV 12.1 08/01/2022 12:43 PM     CMP:    Lab Results   Component Value Date/Time     08/01/2022 12:43 PM    K 3.0 08/01/2022 12:43 PM     08/01/2022 12:43 PM    CO2 27 08/01/2022 12:43 PM    BUN 8 08/01/2022 12:43 PM    CREATININE 1.8 08/01/2022 12:43 PM    GFRAA 34 08/01/2022 12:43 PM    LABGLOM 34 08/01/2022 12:43 PM    GLUCOSE 78 08/01/2022 12:43 PM    GLUCOSE 46 04/02/2012 11:00 AM    PROT 4.9 08/01/2022 12:43 PM    LABALBU 2.7 08/01/2022 12:43 PM    LABALBU 3.8 04/02/2012 11:00 AM    CALCIUM 8.7 08/01/2022 12:43 PM    BILITOT 0.4 08/01/2022 12:43 PM    ALKPHOS 154 08/01/2022 12:43 PM    AST 58 08/01/2022 12:43 PM    ALT 53 08/01/2022 12:43 PM         Objective:   Initial Assessment on arrival:  Vital signs: Temp: 98.4, BP: 90/19, RR: 12, HR: 52 SpO2: 100%    Airway and Condition: Conscious noted    Lungs And Circulation: clear to auscultation bilaterally noted                  Neurologic: NIHSS : 0  noted    Initial Interventions: Labs ordered: ABG, CBC, CMP, mag, Phos, lactic acid, procalcitonin.   Imaging ordered: None  Meds/Fluids/Rx given:   500 cc normal saline    RRT Assessment and Plan:    Ken Brannon is a 78 y.o. female with  has a past medical history of Acute infection of bone ([address] Acute osteomyelitis of phalanx of left hand ([address] Anemia of chronic disease, Arthritis, Breast cancer ([address] CAD (coronary artery disease), Carpal tunnel syndrome, Chronic diastolic CHF (congestive heart failure) ([facility] CKD (chronic kidney disease) stage 4, GFR 15-29 ml/min ([facility] Diabetic retinopathy ([facility] Glaucoma, Hemodialysis patient ([facility] Hyperkalemia, diminished renal excretion, Hyperlipidemia, Hypertension, Hypoglycemia unawareness in type 1 diabetes mellitus ([facility] Insulin dependent type 2 diabetes mellitus ([facility] Neuropathy, Osteomyelitis due to secondary diabetes [facility]), Patient is Sabianist, Refusal of blood product, Ventricular hypertrophy, Ventricular tachycardia ([facility] and Vitreous hemorrhage ([facility] who was admitted on 8/1/2022 with admitting diagnosis Stroke, lacunar [facility]) [I63.81]  Acute CVA (cerebrovascular accident) ([facility] [I63.9]  . RRT was called on 8/2/2022 . Initial assessment and interventions as noted above. Current problems include:   Hypotension 2/2 hypovolemia versus missed medication dose. Central line placed, IV fluid resuscitation and midodrine improved blood pressure. Plan:   Follow lactic acid, procalcitonin to rule out infectious cause  Continue midodrine 10 mg 3 times daily  Hold Lasix for now, reevaluate volume status tomorrow. Close monitor of blood pressure  Remove central line when not necessary, possible midline/PICC line placement by IV team.    ?     Code status: [x] Full  [] DNR-CCA  []DNR-CC []Limited    Disposition:  [x] No transfer   [] Transfer to monitor floor  [] Transfer to: [] MICU [] NICU [] CVICU [] SICU    Patients family updated:     [] Yes  [] No   Discussed with:  [] Critical Care Intensivist:         [] Primary Care Provider:       [] Other: ?    Marii Ross MD PGY- 2  8/2/2022 1:17 AM  Attending Physician: Dr Sher Malone

## 2022-08-02 NOTE — CARE COORDINATION
Patient was admitted from [address] [place] [address] with Claudetta Canter there who reports patient is not a bedhold there but can return on DC. She will need therapy evals and a precert prior to discharge. She goes to Dialysis at [facility]. Confirmed with sister Tu Zambrano that patient is from [facility] and plan is to return. Envelope and ambulance form on soft chart.

## 2022-08-02 NOTE — PLAN OF CARE
Patient's chart updated to reflect:      . - HF care plan, HF education points and HF discharge instructions.  -Orders: 2 gram sodium diet, daily weights, I/O.  -PCP and/or Cardiologist appointment to be scheduled within 7 days of hospital discharge.  -History of HF, not primary admission Dx. Patient admitted for treatment of Diagnosis:  1.  Acute CVA   ESRD on HD  Siomara Gomez RN RN, BSN  Heart Failure Navigator

## 2022-08-02 NOTE — H&P
[facility]  History and Physical      CHIEF COMPLAINT:    Chief Complaint   Patient presents with    Altered Mental Status     LKW unknown. From dialysis, originally from [facility]. Sent in from dialysis d/t slow responses. Had full treatment. BGL 88         Patient of Tracie Subramanian DO presents with:  Stroke, lacunar ([facility]    History of Present Illness:   Patient is a 81-year-old female with a past medical history of acute bone infection, osteomyelitis, anemia, CVA, CAD, CHF, glaucoma, hyperkalemia, hyperlipidemia, hypertension, diabetes, neuropathy, V. tach. Patient presented to the ED from dialysis due to slow response. Patient was able to finish her dialysis treatment. The dialysis nurse said that she was slow to respond and that was not her baseline. Patient was recently discharged on 7/11/2022 from [facility] for hypotension. Patient was on pressors and was in the ICU for 8 days. Patient is alert and oriented working with physical therapy upon my evaluation. Patient denies any chest pain, shortness of breath, fever, chills, headache, blurred vision, and abdominal pain. ER work-up revealed CT head lacunar infarct. Patient had an NIHSS score of 3 in the ED. Patient is admitted to telemetry unit for further testing and treatment. On evaluation, she is resting comfortably though very slow to respond is able to answer all questions appropriately and accurately. She denies any abdominal pain chest pain shortness of breath. She does indicate that her right upper extremity is more difficult to lift than usual.  She has significant edema throughout bilateral upper extremities. Otherwise she is resting comfortably in no distress. REVIEW OF SYSTEMS:  Pertinent negatives are above in HPI. 10 point ROS otherwise negative. Past Medical History:   Diagnosis Date    Acute infection of bone ([facility]     infection of rt foot, resolved.     Acute osteomyelitis of phalanx of left hand ([address] 1/27/2022    Left third distal phalanx    Anemia of chronic disease     Arthritis     Breast cancer ([address]     right breast, 2008/ bladder, 2006- last chemotherapy \"years ago\"    CAD (coronary artery disease)     Carpal tunnel syndrome     bilat - for OR left hand 3-17-20     Chronic diastolic CHF (congestive heart failure) ([address] 09/23/2014 9/23/14- echocardiogram revealed moderate LV concentric hypertrophy, stage III diastolic dysfunction, mild tricuspid regurgitation    CKD (chronic kidney disease) stage 4, GFR 15-29 ml/min ([facility])     Diabetic retinopathy ([address]     Glaucoma     Hemodialysis patient ([address]     [facility]- Dr. Gwendolyn Perez - left arm fistula     Hyperkalemia, diminished renal excretion 11/9/2017    Hyperlipidemia     Hypertension     Hypoglycemia unawareness in type 1 diabetes mellitus ([address] 11/7/2017    Insulin dependent type 2 diabetes mellitus ([address]     Neuropathy     feet    Osteomyelitis due to secondary diabetes ([address]     rt great toe with amputation    Patient is Hindu 11/7/2017    Refusal of blood product     patient states she dose not take blood transfusion    Ventricular hypertrophy     Ventricular tachycardia ([address] 5/24/2021    Vitreous hemorrhage ([address]     left eye         Past Surgical History:   Procedure Laterality Date    AMPUTATION      right great toe    ANKLE SURGERY      correction on charcot joint of right ankle    BONE BIOPSY N/A 04/18/2022    BONE BIOPSY PERCUTANEOUS L1/L2 performed by Ziggy Herrera MD at [address] [facility]  12/09/2021    Dr Dominick Hoyos Left 03/17/2020    LEFT CARPAL TUNNEL RELEASE performed by Patricia Cardenas MD at [address]      bilateral    CHOLECYSTECTOMY      COLONOSCOPY      CORONARY ANGIOPLASTY  05/05/2022    Dr. Sofya Ellis - RCA angioplasty    CYSTOSCOPY      DIALYSIS FISTULA CREATION Left 01/31/2018    upper arm/Dr. Weston Newton    ECHO COMPL W DOP COLOR FLOW  02/14/2013         ECHO COMPLETE  09/17/2013         FINGER AMPUTATION Left 01/20/2022    AMPUTATION OF LEFT THIRD DIGIT, DISTAL PHALANX performed by Zoila Cleaning MD at [facility]      right    OTHER SURGICAL HISTORY  09/27/2011    PPV, membranectomy, laser Right eye    OTHER SURGICAL HISTORY  insertion lumbar drain insertion    10/12/`14    OTHER SURGICAL HISTORY  10/22/2015    percutaneous lead placement for spinal cord stimulator    OTHER SURGICAL HISTORY  11/03/2015    Spinal; cord stimulator- turned off as of 3-10-20     PORT SURGERY N/A 6/27/2022    PORT REMOVAL performed by Alek Guerrier MD at [facility] AV ANAST,UP ARM BASILIC VEIN TRANSPOSIT Left 05/15/2018    TRANSPOSITION STAGE II AV FISTULA - LEFT UPPER ARM performed by Susanne Farrar MD at [address] [facility] ANGIOACCESS AV FISTULA Left 09/25/2018    SUPERFICIALIZATION AV FISTULA - LEFT ARM performed by Susanne Farrar MD at [address] W/VITRECTOMY ANY METH Left 04/10/2018    PARS PLANA VITRECTOMY 25 GAUGE RETINAL DETACHMENT REPAIR air fluid exchange, endolaser performed by Babak Street MD at [address] [facility]      L2    TRANSESOPHAGEAL ECHOCARDIOGRAM  11/11/2021    Dr. Yordy Goff    TRANSESOPHAGEAL ECHOCARDIOGRAM  04/19/2022        TUNNELED VENOUS CATHETER PLACEMENT  11/15/2017    VITRECTOMY Left 04/10/2018    PARS PLANA VITRECTOMY; RETINAL DETACHMENT REPAIR; GAS BUBBLE; LASER LEFT EYE       Medications Prior to Admission:    Medications Prior to Admission: atorvastatin (LIPITOR) 80 MG tablet, Take 80 mg by mouth at bedtime  bisacodyl (DULCOLAX) 10 MG suppository, Place 10 mg rectally daily as needed for Constipation  gabapentin (NEURONTIN) 100 MG capsule, Take 100 mg by mouth in the morning and 100 mg at noon and 100 mg before bedtime.   magnesium hydroxide (MILK OF MAGNESIA) 400 MG/5ML suspension, Take 30 mLs by mouth daily as needed for Constipation  vancomycin (VANCOCIN) 1000 MG/200ML SOLN, Infuse 1,000 mg intravenously every 72 hours  ondansetron (ZOFRAN) 4 MG tablet, Take 4 mg by mouth every 6 hours as needed for Nausea or Vomiting  sertraline (ZOLOFT) 25 MG tablet, Take 25 mg by mouth in the morning. polyethylene glycol (GLYCOLAX) 17 g packet, Take 17 g by mouth in the morning. senna (SENOKOT) 8.6 MG tablet, Take 1 tablet by mouth nightly as needed for Constipation  brimonidine (ALPHAGAN) 0.2 % ophthalmic solution, Place 1 drop into the right eye in the morning and 1 drop before bedtime. midodrine (PROAMATINE) 10 MG tablet, Take 1 tablet by mouth in the morning and 1 tablet at noon and 1 tablet in the evening. Take with meals. acetaminophen (TYLENOL) 325 MG tablet, Take 650 mg by mouth every 4 hours as needed for Pain  Acidophilus Lactobacillus CAPS, Take 1 capsule by mouth every morning  aspirin 81 MG EC tablet, Take 81 mg by mouth every morning  meclizine (ANTIVERT) 12.5 MG tablet, Take 12.5 mg by mouth every 8 hours as needed for Dizziness  mirtazapine (REMERON) 7.5 MG tablet, Take 7.5 mg by mouth nightly  oxyCODONE-acetaminophen (PERCOCET) 5-325 MG per tablet, Take 2 tablets by mouth every 8 hours as needed for Pain.   ZINC OXIDE, TOPICAL, (SECURA PROTECTIVE) 10 % CREA, Apply 1 Dose topically 2 times daily Apply to buttocks  doxycycline hyclate (VIBRAMYCIN) 100 MG capsule, Take 1 capsule by mouth every 12 hours  bumetanide (BUMEX) 2 MG tablet, Take 2 mg by mouth See Admin Instructions Given Sunday,Tuesday,Thursday,  lidocaine-prilocaine (EMLA) 2.5-2.5 % cream, Apply 1 Dose topically See Admin Instructions Given Monday,Wednesday,Friday prior to Dialysis  allopurinol (ZYLOPRIM) 100 MG tablet, Take 1 tablet by mouth daily  ticagrelor (BRILINTA) 90 MG TABS tablet, Take 1 tablet by mouth 2 times daily  B Complex-C-Folic Acid (BONI CAPS) 1 MG CAPS, Take 1 mg by mouth nightly   omeprazole (PRILOSEC) 20 MG delayed release capsule, Take 20 mg by mouth daily as needed (gerd)  LUMIGAN 0.01 % SOLN ophthalmic drops, Place 1 drop into the right eye nightly     Note that the patient's home medications were reviewed and the above list is accurate to the best of my knowledge at the time of the exam.    Allergies:    Furosemide    Social History:    reports that she has never smoked. She has never used smokeless tobacco. She reports that she does not drink alcohol and does not use drugs. Family History:   family history includes Breast Cancer (age of onset: 61) in her maternal grandmother and mother; Heart Disease in her father; Hypertension in her mother; Prostate Cancer in her father. PHYSICAL EXAM:    Vitals:  BP (!) 101/49   Pulse 53   Temp 97 °F (36.1 °C) (Oral)   Resp 12   SpO2 98%       General appearance: NAD, conversant, slight facial droop  Eyes: Sclerae anicteric, PERRLA  HEENT: AT/NC, MMM  Neck: FROM, supple, no thyromegaly  Lymph: No cervical / supraclavicular lymphadenopathy  Lungs: Clear to auscultation, WOB normal  CV: Irregular, no MRGs, no lower extremity edema  Abdomen: Soft, non-tender; no masses or HSM, +BS  Extremities: FROM without synovitis. No clubbing or cyanosis of the hands, left upper extremity edematous, right upper extremity fistula  Skin: no rash, induration, lesions, or ulcers  Psych: Calm and cooperative. Normal judgement and insight. Normal mood and affect. Neuro: Alert and interactive, face symmetric, speech fluent, weakness    LABS:  All labs reviewed.   Of note:  CBC with Differential:    Lab Results   Component Value Date/Time    WBC 3.0 08/02/2022 06:25 AM    RBC 2.87 08/02/2022 06:25 AM    HGB 7.8 08/02/2022 06:25 AM    HCT 25.3 08/02/2022 06:25 AM    PLT 72 08/02/2022 06:25 AM    MCV 88.2 08/02/2022 06:25 AM    MCH 27.2 08/02/2022 06:25 AM    MCHC 30.8 08/02/2022 06:25 AM    RDW 17.9 08/02/2022 06:25 AM    NRBC 2.6 08/02/2022 01:15 AM    SEGSPCT 70 03/12/2014 10:53 AM    BANDSPCT 1 02/18/2015 10:35 AM    LYMPHOPCT 16.9 08/02/2022 06:25 AM    PROMYELOPCT 1.8 02/18/2017 04:00 AM    MONOPCT 7.8 08/02/2022 06:25 AM    MYELOPCT 0.9 10/24/2017 10:45 AM    BASOPCT 0.0 08/02/2022 06:25 AM    MONOSABS 0.23 08/02/2022 06:25 AM    LYMPHSABS 0.50 08/02/2022 06:25 AM    EOSABS 0.01 08/02/2022 06:25 AM    BASOSABS 0.00 08/02/2022 06:25 AM     CMP:    Lab Results   Component Value Date/Time     08/02/2022 06:25 AM    K 3.1 08/02/2022 06:25 AM     08/02/2022 06:25 AM    CO2 26 08/02/2022 06:25 AM    BUN 9 08/02/2022 06:25 AM    CREATININE 1.9 08/02/2022 06:25 AM    GFRAA 32 08/02/2022 06:25 AM    LABGLOM 32 08/02/2022 06:25 AM    GLUCOSE 70 08/02/2022 06:25 AM    GLUCOSE 46 04/02/2012 11:00 AM    PROT 5.1 08/02/2022 01:15 AM    LABALBU 2.8 08/02/2022 01:15 AM    LABALBU 3.8 04/02/2012 11:00 AM    CALCIUM 8.1 08/02/2022 06:25 AM    BILITOT 0.3 08/02/2022 01:15 AM    ALKPHOS 161 08/02/2022 01:15 AM    AST 65 08/02/2022 01:15 AM    ALT 62 08/02/2022 01:15 AM       Imaging:  CXR: Small patchy opacity within the left lung base which could represent atelectasis or pneumonia. The right lung is clear    CT head: Small hypodensity right jerardo not demonstrated previously. Acute or subacute lacunar infarct at this location cannot be excluded. EKG:  I've personally reviewed the patient's EKG:  Sinus bradycardia    Telemetry:  I've personally reviewed the patient's telemetry:  SB    ASSESSMENT/PLAN:  Principal Problem:    Stroke, lacunar (HCC)  Active Problems:    CKD (chronic kidney disease) stage 3, GFR 30-59 ml/min ([facility])  Resolved Problems:    * No resolved hospital problems.  *    52-year-old female with complicated past medical history including but not limited to-ESRD, osteomyelitis, coronary artery disease, previous strokes, CHF presents to the ED with strokelike symptoms and is admitted to telemetry unit with    Acute/subacute lacunar infarct right jerardo  -MRI brain without contrast pending  -Aspirin 81 mg Lipitor 40 mg daily  -Consult neurology - await input  -Check lipid panel and hemoglobin A1c needs tight control of risk factors. -Monitor blood pressure-adjust medications as needed.  -Discussed risk factor modification.  -Swallow study - failed. ?Peg placement-repeat swallow eval in a day or 2    ESRD  -Dialysis MWF  -Consult nephrology  -Monitor labs  -Discontinue IV fluids as patient is already markedly volume overloaded    Mild elevation in ALT AST  Continue to monitor daily  No evidence of Daly sign right upper quadrant    Medication for other comorbidities continue as appropriate dose adjustment as necessary. Attempted to contact family about peg placement. Awaiting return call. Patient is slow to respond to questions for me    DVT prophylaxis  PT OT  Discharge planning  Case discussed with attending and agreed upon plan of care. Code status: Full  Requires inpatient level of care  GOYO Zapien CNP    3:47 PM  8/2/2022     Above note edited to reflect my thoughts     I personally saw, examined and provided care for the patient. Radiographs, labs and medication list were reviewed by me independently. The case was discussed in detail and plans for care were established. Review of Therese DAWSON CNP, documentation was conducted and revisions were made as appropriate directly by me. I agree with the above documented exam, problem list, and plan of care.      Ej Crenshaw MD  5:22 PM  8/2/2022

## 2022-08-02 NOTE — PROGRESS NOTES
Physical Therapy  Physical Therapy Initial Assessment     Name: Katerina Zepeda  : 1955  MRN: [id number]      Date of Service: 2022    Evaluating PT:  Laurie Panda PT, DPT [id number]    Room #:  8809/2236-V  Diagnosis:  Stroke, lacunar ([facility] [I63.81]  Acute CVA (cerebrovascular accident) ([facility] [I63.9]  PMHx/PSHx:   has a past medical history of Acute infection of bone ([facility] Acute osteomyelitis of phalanx of left hand ([facility] Anemia of chronic disease, Arthritis, Breast cancer ([facility] CAD (coronary artery disease), Carpal tunnel syndrome, Chronic diastolic CHF (congestive heart failure) ([facility] CKD (chronic kidney disease) stage 4, GFR 15-29 ml/min ([facility] Diabetic retinopathy ([facility] Glaucoma, Hemodialysis patient ([facility] Hyperkalemia, diminished renal excretion, Hyperlipidemia, Hypertension, Hypoglycemia unawareness in type 1 diabetes mellitus ([facility] Insulin dependent type 2 diabetes mellitus ([facility] Neuropathy, Osteomyelitis due to secondary diabetes [facility]), Patient is Episcopalian, Refusal of blood product, Ventricular hypertrophy, Ventricular tachycardia ([facility] and Vitreous hemorrhage ([facility] Procedure/Surgery:  NA  Precautions:  Falls, bed alarm, deconditioning, latent responses, slow speech  Equipment Needs:  TBD    SUBJECTIVE:    Pt was admitted from [place] [address]. Pt ambulated with [address] and required assistance PTA. Active with PT and OT. OBJECTIVE:   Initial Evaluation  Date: 22 Treatment Short Term/ Long Term   Goals   AM-PAC 6 Clicks      Was pt agreeable to Eval/treatment? Yes     Does pt have pain?  L sided pain but unable to rate     Bed Mobility  Rolling: NT  Supine to sit: MaxA with HOB elevated  Sit to supine: Dep  Scooting: MaxA  Carlos Alberto    Transfers Sit to stand: MaxA elevated bed  Stand to sit: MaxA  Stand pivot: NT  Carlos Alberto   Ambulation   NT  >50 feet Carlos Alberto with [address]   Stair negotiation: ascended and descended NT  >2 step ModA with 1 rail   ROM BUE:  WFL passively  BLE:  WFL passively awareness and BLE ROM  Transfer training - pt was given verbal and tactile cues to facilitate proper hand placement, technique and safety during sit to stand and stand to sit a transfers as well as provided with physical assistance. Skilled positioning - Pt placed in the chair position with pillows utilized to facilitate upright posture, joint and skin integrity, and interaction with environment. Pt's/ family goals   1. No goals stated    Patient and or family understand(s) diagnosis, prognosis, and plan of care. Yes    PHYSICAL THERAPY PLAN OF CARE:    PT POC is established based on physician order and patient diagnosis     Referring provider/PT Order:  GOYO Marlow - CNP /08/01/22 1845 PT eval and treat  Diagnosis:  Stroke, lacunar ([facility] [I63.81]  Acute CVA (cerebrovascular accident) ([facility] [I63.9]  Specific instructions for next treatment:  Progress activity    Current Treatment Recommendations:     [x] Strengthening to improve independence with functional mobility   [] ROM to improve independence with functional mobility   [x] Balance Training to improve static/dynamic balance and to reduce fall risk  [x] Endurance Training to improve activity tolerance during functional mobility   [x] Transfer Training to improve safety and independence with all functional transfers   [x] Gait Training to improve gait mechanics, endurance and asses need for appropriate assistive device  [] Stair Training in preparation for safe discharge home and/or into the community   [x] Positioning to prevent skin breakdown and contractures  [x] Safety and Education Training   [x] Patient/Caregiver Education   [] HEP  [] Other     PT long term treatment goals are located in above grid    Frequency of treatments: 2-5x/week x 1-2 weeks.     Time in  0756  Time out  0820    Total Treatment Time  10 minutes     Evaluation Time includes thorough review of current medical information, gathering information on past medical history/social history and prior level of function, completion of standardized testing/informal observation of tasks, assessment of data and education on plan of care and goals.     CPT codes:  [] Low Complexity PT evaluation 27254  [x] Moderate Complexity PT evaluation 91182  [] High Complexity PT evaluation 70667  [] PT Re-evaluation 38975  [] Gait training 61870 - minutes  [] Manual therapy 03883 - minutes  [x] Therapeutic activities 97450 10 minutes  [] Therapeutic exercises 43357 - minutes  [] Neuromuscular reeducation 68621 - minutes     Darlyn Birmingham, SPT  Jayden Mu, PT, DPT  [id number]

## 2022-08-02 NOTE — PROGRESS NOTES
[address], [address]                                                  Patient Name: Paris Gamboa    MRN: [id number]    : 1955    Room: [facility]          Evaluating OT: Jose Dewitt OTR/L; [id number]       Referring Provider: GOYO Harp CNP    Specific Provider Orders/Date: OT Eval and Treat 22      Diagnosis: Lacunar stroke    Surgery: 22 RRT d/t hypotension. 22 left femoral vein Triple Lumen Catheter placement. Pertinent Medical History:  has a past medical history of Acute infection of bone ([facility] [address] Acute osteomyelitis of phalanx of left hand ([address] Anemia of chronic disease, Arthritis, Breast cancer ([facility] [address] CAD (coronary artery disease), Carpal tunnel syndrome, Chronic diastolic CHF (congestive heart failure) ([facility] [address] CKD (chronic kidney disease) stage 4, GFR 15-29 ml/min ([address] Diabetic retinopathy ([address] Glaucoma, Hemodialysis patient ([facility] [address] Hyperkalemia, diminished renal excretion, Hyperlipidemia, Hypertension, Hypoglycemia unawareness in type 1 diabetes mellitus ([address] Insulin dependent type 2 diabetes mellitus ([address] Neuropathy, Osteomyelitis due to secondary diabetes [facility]), Patient is Bahai, Refusal of blood product, Ventricular hypertrophy, Ventricular tachycardia ([address] and Vitreous hemorrhage ([address]      Recommended Adaptive Equipment: TBD     Precautions:  Fall Risk, triple lumen catheter (R femoral vein), slowed speech/latent responses, +bed alarm, TAPS     Assessment of current deficits    [x] Functional mobility  [x]ADLs  [x] Strength               []Cognition    [x] Functional transfers   [x] IADLs         [x] Safety Awareness   [x]Endurance    [x] Fine Coordination              [x] Balance      [] Vision/perception   []Sensation     []Gross Motor Coordination  [] ROM  [] Delirium                   [] Motor Control     OT PLAN OF CARE   OT POC based on physician orders, patient diagnosis and results of clinical assessment    Frequency/Duration 2-4 days/wk for 2 weeks PRN   Specific OT Treatment Interventions to include:   * Instruction/training on adapted ADL techniques and AE recommendations to increase functional independence within precautions       * Training on energy conservation strategies, correct breathing pattern and techniques to improve independence/tolerance for self-care routine  * Functional transfer/mobility training/DME recommendations for increased independence, safety, and fall prevention  * Patient/Family education to increase follow through with safety techniques and functional independence  * Recommendation of environmental modifications for increased safety with functional transfers/mobility and ADLs  * Therapeutic exercise to improve motor endurance, ROM, and functional strength for ADLs/functional transfers  * Therapeutic activities to facilitate/challenge dynamic balance, stand tolerance for increased safety and independence with ADLs  * Positioning to improve skin integrity, interaction with environment and functional independence    Modified Odin Scale   Score     Description  0             No symptoms  1             No significant disability despite symptoms  2             Slight disability; able to look after own affairs  3             Moderate disability; able to ambulate without assist/ requires assist with ADLs  4             Moderate/Severe disability;requires assist to ambulate/assist with ADLs  5             Severe disability;bedridden/incontinent   6               Score:   5    Home Living: Pt admitted from [place] [address]. Bathroom setup: Handicap accessible   Equipment owned: ww, w/c    Prior Level of Function: assist with ADLs , assist with IADLs; engaged in functional mobility with use of  ww short distances with therapy & w/c longer distances.  Pt reports utilizing ww to tasks       LUE Shoulder flexion ~90 degrees, distally WFL. Shoulder 3-/5, distally 3+/5 grossly tested. Fair-  and FMC/dexterity noted during ADL tasks   Improve overall LUE strength WFL for participation in functional tasks       Hearing: [facility]  Sensation:  No c/o numbness or tingling BUE  Tone: WFL BUE  Edema: BUE swelling (more notably in RUE)    Comment: Cleared by RN to see pt. Upon arrival patient supine in bed and agreeable to OT session. At end of session, patient supine in bed with call light and phone within reach, +bed alarm, all lines and tubes intact with RN in room. Overall patient demonstrated decreased independence and safety during completion of ADL/functional transfer/mobility tasks. Therapist facilitated ADL tasks & bed mobility to address safety awareness & functional engagement throughout daily activities. Pt c/o min dizziness throughout session. BP seated EOB 49/40 - pt placed in supine & RN made aware/in room. Pt would benefit from continued skilled OT to increase safety and independence with completion of ADL/IADL tasks for functional independence and quality of life. Treatment: OT treatment provided this date includes: ADL-  Instruction/training on safety and adapted techniques for completion of ADLs: Pt required assist to don socks at bed-level. Pt required assist to tie gown & RUE hand over hand assist to wash face seated EOB. Mobility-  Instruction/training on safety and improved independence with bed mobility. Pt required assist for trunk control & BLE management to complete supine<>sit transfers to engage in repositioning techniques & functional activities. Sitting EOB ~5 minutes to improve dynamic sitting balance and activity tolerance during ADLs. Skilled positioning/alignment-  Proper Positioning/Alignment.  Pt required assist.cuing to maintain proper wong mechanics throughout duration of session to promote skin/joint integrity, safety awareness, & functional engagement throughout daily activities. Skilled monitoring of Vitals - Pt c/o 3/10 dizziness seated EOB - BP 49/40. Pt placed supine & RN in room/made aware. Rehab Potential: Good for established goals     LTG: maximize independence with ADLs to return to PLOF    Patient and/or family were instructed on functional diagnosis, prognosis/goals and OT plan of care. Demonstrated fair understanding. Eval Complexity: Low  History: Expanded chart review of medical records and additional review of physical, cognitive, or psychosocial history related to current functional performance  Exam: 3+ performance deficits  Assistance/Modification: Min/mod assistance or modifications required to perform tasks. May have comorbidities that affect occupational performance. Evaluation time includes thorough review of current medical information, gathering information on past medical & social history & PLOF, completion of standardized testing, informal observation of tasks, consultation with other medical professions/disciplines, assessment of data & development of POC/goals. Time In: 11:43a  Time Out: 11:53p  Therapist noted leakage around triple lumen catheter site. RN made aware & requesting therapy to attempt later in day after dressing change. x  Time In: 1:50p  Time Out: 2:05p  Total Treatment Time: 10 minutes    Min Units   OT Eval Low 63315  x     OT Eval Medium 37426      OT Eval High 54936      OT Re-Eval [id number]       Therapeutic Ex 74659       Therapeutic Activities 92082  10 1    ADL/Self Care 10268       Orthotic Management 59516       Manual 24552     Neuro Re-Ed 02569       Non-Billable Time          Evaluation Time additionally includes thorough review of current medical information, gathering information on past medical history/social history and prior level of function, interpretation of standardized testing/informal observation of tasks, assessment of data and development of plan of care and goals.

## 2022-08-02 NOTE — DISCHARGE INSTR - COC
Continuity of Care Form    Patient Name: Venancio Lamb   :  1955  MRN:  [id number]    Admit date:  2022  Discharge date:  ***    Code Status Order: Full Code   Advance Directives:     Admitting Physician:  Cristel Kong MD  PCP: Fran Bundy DO    Discharging Nurse: [facility] Unit/Room#: 4158/4114-V  Discharging Unit Phone Number: ***    Emergency Contact:   Extended Emergency Contact Information  Primary Emergency Contact: Pita Gaines  Address: [facility] [address] [place] [address], [address] [place] [address],[address] Phone: [phone]  Relation: Brother/Sister  Secondary Emergency Contact: Diana Campbell  Home Phone: [phone]  Mobile Phone: [phone]  Relation: Brother/Sister    Past Surgical History:  Past Surgical History:   Procedure Laterality Date    AMPUTATION      right great toe    ANKLE SURGERY      correction on charcot joint of right ankle    BONE BIOPSY N/A 2022    BONE BIOPSY PERCUTANEOUS L1/L2 performed by Hair Loving MD at [address] [place] [address] [facility]  2021    Dr Johanna Delarosa Left 2020    [facility] performed by Miquel Barrera MD at [address]      bilateral    CHOLECYSTECTOMY      COLONOSCOPY      CORONARY ANGIOPLASTY  2022    Dr. Melody Blanco - RCA angioplasty    [address] Left 2018    upper arm/Dr. Martha Eisenberg    ECHO COMPL W DOP COLOR FLOW  2013         ECHO COMPLETE  2013         FINGER AMPUTATION Left 2022    AMPUTATION OF LEFT THIRD DIGIT, DISTAL PHALANX performed by Galindo Robles MD at [facility]      right    OTHER SURGICAL HISTORY  2011    PPV, membranectomy, laser Right eye    OTHER SURGICAL HISTORY  insertion lumbar drain insertion    10/12/`14    OTHER SURGICAL HISTORY  10/22/2015    percutaneous lead placement for spinal cord stimulator    OTHER SURGICAL HISTORY 11/03/2015    Spinal; cord stimulator- turned off as of 3-10-20     PORT SURGERY N/A 6/27/2022    PORT REMOVAL performed by Margarette Gómez MD at [facility] AV ANAST,UP ARM BASILIC VEIN TRANSPOSIT Left 05/15/2018    TRANSPOSITION STAGE II AV FISTULA - LEFT UPPER ARM performed by Daron Boykin MD at [address] [facility] ANGIOACCESS AV FISTULA Left 09/25/2018    SUPERFICIALIZATION AV FISTULA - LEFT ARM performed by Daron Boykin MD at [facility] RPR RETINAL [place] W/VITRECTOMY ANY METH Left 04/10/2018    PARS PLANA VITRECTOMY 25 GAUGE RETINAL DETACHMENT REPAIR air fluid exchange, endolaser performed by Ileana Rivera MD at [facility]      L2    TRANSESOPHAGEAL ECHOCARDIOGRAM  11/11/2021    Dr. Nilda Beckford    TRANSESOPHAGEAL ECHOCARDIOGRAM  04/19/2022        TUNNELED VENOUS CATHETER PLACEMENT  11/15/2017    VITRECTOMY Left 04/10/2018    PARS PLANA VITRECTOMY; RETINAL DETACHMENT REPAIR; GAS BUBBLE; LASER LEFT EYE       Immunization History:   Immunization History   Administered Date(s) Administered    COVID-19, MODERNA BLUE border, Primary or Immunocompromised, (age 12y+), IM, 100 mcg/0.5mL 03/11/2021, 04/08/2021    Pneumococcal Polysaccharide (Rupttlhfs43) 12/04/2018       Active Problems:  Patient Active Problem List   Diagnosis Code    Diabetic retinopathy ([facility] [address] E11.319    Anemia, chronic disease D63.8    Malignant neoplasm of right female breast ([facility] [address] C50.911    Atherosclerosis of native coronary artery of native heart without angina pectoris I25.10    Moderate obesity E66.8    Left ventricular hypertrophy I51.7    Herniated lumbar intervertebral disc M51.26    Lumbar degenerative disc disease M51.36    Constipation K59.00    Pseudomeningocele G96.198    Lumbar radiculopathy M54.16    Lymphedema of arm I89.0    Anemia of chronic disease D63.8    Chronic diastolic CHF (congestive heart failure) (HCC) I50.32    Hypertension I10    Glaucoma H40.9    Refusal of blood product Z78.9 Controlled type 2 diabetes mellitus with chronic kidney disease on chronic dialysis, with long-term current use of insulin ([facility]) E11.22, N18.6, Z79.4, Z99.2    Vitreous hemorrhage ([facility] [address] H43.10    Patient is Sabianism Z78.9    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 ([facility]) E11.649    Chronic pain syndrome G89.4    Lumbar post-laminectomy syndrome M96.1    Cervicalgia M54.2    Diabetic peripheral neuropathy ([facility]) E11.42    ESRD (end stage renal disease) ([facility]) N18.6    Bilateral carpal tunnel syndrome G56.03    Cardiac arrest ([facility]) I46.9    ESRD (end stage renal disease) on dialysis ([facility]) N18.6, Z99.2    Mixed hyperlipidemia E78.2    Lymphedema of right upper extremity I89.0    Coronary artery disease involving native coronary artery of native heart with angina pectoris ([facility]) I25.119    Mitral valve disease I05.9    CAD in native artery I25.10    Lumbar stenosis without neurogenic claudication M48.061    Intractable low back pain M54.59    Unable to ambulate R26.2    NSTEMI (non-ST elevated myocardial infarction) ([facility]) I21.4    Moderate protein-calorie malnutrition ([facility]) E44.0    Ischemic cardiomyopathy I25.5    Lower abdominal pain R10.30    Hypotension I95.9    Vertebral osteomyelitis, chronic ([facility]) M46.20    Symptomatic sinus bradycardia R00.1    Status post amputation of right great toe ([facility]) Z89.411    Stroke, lacunar ([facility]) I63.81       Isolation/Infection:   Isolation            No Isolation          Patient Infection Status       Infection Onset Added Last Indicated Last Indicated By Review Planned Expiration Resolved Resolved By    None active    Resolved    COVID-19 (Rule Out) 07/12/22 07/12/22 07/12/22 Respiratory Panel, Molecular, with COVID-19 (Restricted: peds pts or suitable admitted adults) (Ordered)   07/12/22 Rule-Out Test Resulted    C-diff Rule Out 06/22/22 06/22/22 06/22/22 CLOSTRIDIUM DIFFICILE EIA (Ordered)   06/23/22 Elaine Gonzales RN    Canceled Nora Ko Navarrete 22 1743     COVID-19 22 Respiratory Panel, Molecular, with COVID-19 (Restricted: peds pts or suitable admitted adults)   22 Erasmo Grant, RN    Per CDC guidelines patient does not need droplet plus isolation. Patient postiive for SARS-CoV-2 on 22.     COVID-19 (Rule Out) 22 Respiratory Panel, Molecular, with COVID-19 (Restricted: peds pts or suitable admitted adults) (Ordered)   22 Rule-Out Test Resulted    COVID-19 (Rule Out) 06/15/22 06/15/22 06/15/22 COVID-19, Rapid (Ordered)   06/15/22 Rule-Out Test Resulted    COVID-19 22 Covid-19 Ambulatory   22     COVID-19 (Rule Out) 22 COVID-19, Rapid (Ordered)   22 Rule-Out Test Resulted    COVID-19 (Rule Out) 21 Respiratory Panel, Molecular, with COVID-19 (Restricted: peds pts or suitable admitted adults) (Ordered)   21 Rule-Out Test Resulted    COVID-19 (Rule Out) 20 Covid-19 Ambulatory (Ordered)   20 Rule-Out Test Resulted            Nurse Assessment:  Last Vital Signs: BP (!) 126/95   Pulse 50   Temp (!) 96 °F (35.6 °C) (Oral)   Resp 15   SpO2 97%     Last documented pain score (0-10 scale): Pain Level: 0  Last Weight:   Wt Readings from Last 1 Encounters:   22 200 lb (90.7 kg)     Mental Status:  {IP PT MENTAL STATUS:}    IV Access:  { YOKO IV ACCESS:457929501}    Nursing Mobility/ADLs:  Walking   {CHP DME CVPC:395643593}  Transfer  {CHP DME RDSV:137819005}  Bathing  {CHP DME OBIB:454817928}  Dressing  {CHP DME QFHR:173549127}  Toileting  {CHP DME UEG}  Feeding  {CHP DME PBBJ:673713126}  Med Admin  {CHP DME HBSD:316645478}  Med Delivery   { YOKO MED Delivery:883997127}    Wound Care Documentation and Therapy:  Wound 22 Buttocks Right red,open (Active)   Number of days: 91       Wound 22 Buttocks Left dry,red (Active) Number of days: 91       Wound 07/15/22 Chest (Active)   Dressing Status Reinforced dressing 22 0330   Wound Cleansed Cleansed with saline 22 182   Dressing/Treatment Foam 22 182   Wound Length (cm) 2 cm 22 1821   Wound Width (cm) 2 cm 22 182   Wound Depth (cm) 0.5 cm 22 182   Wound Surface Area (cm^2) 4 cm^2 22 182   Change in Wound Size % (l*w) -300 22 1821   Wound Volume (cm^3) 2 cm^3 22 182   Wound Healing % -300 22 182   Wound Assessment McNair/red;Slough 22 182   Drainage Amount Scant 22 182   Drainage Description Yellow 22   Number of days: 17       Wound 22 Buttocks Left (Active)   Wound Cleansed Cleansed with saline 22 1856   Dressing/Treatment Open to air 22 1856   Number of days: 0        Elimination:  Continence: Bowel: {YES / YX:62425}  Bladder: {YES / DO:12313}  Urinary Catheter: {Urinary Catheter:084191990}   Colostomy/Ileostomy/Ileal Conduit: {YES / CW:27732}       Date of Last BM: ***  No intake or output data in the 24 hours ending 22 0913  No intake/output data recorded.     Safety Concerns:     [address] [employer] Safety Concerns:050648605}    Impairments/Disabilities:      [employer] Impairments/Disabilities:742642327}    Nutrition Therapy:  Current Nutrition Therapy:   [employer] Diet List:041399668}    Routes of Feeding: {CHP DME Other Feedings:133663759}  Liquids: {Slp liquid thickness:18715}  Daily Fluid Restriction: {CHP DME Yes amt example:163061386}  Last Modified Barium Swallow with Video (Video Swallowing Test): {Done Not Done YCMQ:284401158}    Treatments at the Time of Hospital Discharge:   Respiratory Treatments: ***  Oxygen Therapy:  {Therapy; copd oxygen:94617}  Ventilator:    {MH CC Vent DMNO:281772529}    Rehab Therapies: {THERAPEUTIC INTERVENTION:0602908800}  Weight Bearing Status/Restrictions: { CC Weight Bearin}  Other Medical Equipment (for information only, NOT a DME order):  {EQUIPMENT:662475428}  Other Treatments: ***    Patient's personal belongings (please select all that are sent with patient):  {CHP DME Belongings:162753073}    RN SIGNATURE:  {Esignature:646660246}    CASE MANAGEMENT/SOCIAL WORK SECTION    Inpatient Status Date: ***    Readmission Risk Assessment Score:  Readmission Risk              Risk of Unplanned Readmission:  [id number]           Discharging to Facility/ Agency   Name: [facility]  Address: [place] [address]  Phone: [phone]  Fax: [phone]    Dialysis Facility (if applicable)   Name:  Address:  Dialysis Schedule:  Phone:  Fax:    / signature: Electronically signed by YRN Jackson on 8/2/22 at 9:15 AM EDT    PHYSICIAN SECTION    Prognosis: Good    Condition at Discharge: Stable    Rehab Potential (if transferring to Rehab): Good    Recommended Labs or Other Treatments After Discharge: ***    Physician Certification: I certify the above information and transfer of Ken Brannon  is necessary for the continuing treatment of the diagnosis listed and that she requires [facility] for greater 30 days.      Update Admission H&P: {CHP DME Changes in OBFEL:043083034}    PHYSICIAN SIGNATURE:  {Esignature:937263054}

## 2022-08-02 NOTE — PROGRESS NOTES
SPEECH/LANGUAGE PATHOLOGY  CLINICAL ASSESSMENT OF SWALLOWING FUNCTION   and PLAN OF CARE    PATIENT NAME:  Elmer Johnson  (female)     MRN:  [id number]    :  1955  (78 y.o.)  STATUS:  Inpatient: Room 8515/8515-B    TODAY'S DATE:  22    SLP swallowing-dysphagia evaluation and treatment  Start:  22,   End:  22,   ONE TIME,   Standing Count:  1 Occurrences,   Yasmeen Santa MD   REASON FOR REFERRAL: history of dysphagia; history of anterior bridging osteophytes and penetration after swallow of consecutive straw sips   EVALUATING THERAPIST: ZAIN Albarado                 RESULTS:    DYSPHAGIA DIAGNOSIS:   Clinical indicators of questionable pharyngeal phase dysphagia       DIET RECOMMENDATIONS:  NPO until MBSS can be completed       FEEDING RECOMMENDATIONS:     Assistance level:  Not applicable      Compensatory strategies recommended: Not applicable      Discussed recommendations with nursing and/or faxed report to referring provider: Yes    SPEECH THERAPY  PLAN OF CARE   The dysphagia POC is established based on physician order, dysphagia diagnosis and results of clinical assessment     Will establish POC once MBSS is completed. Conditions Requiring Skilled Therapeutic Intervention for dysphagia:    Patient is performing below functional baseline d/t  current acute condition, Multiple diagnoses, multiple medications, and increased dependency upon caregivers. Specific dysphagia interventions to include:      Therapeutic exercises  Trials of upgraded diet/liquid     Specific instructions for next treatment:  MBSS to be completed  Patient Treatment Goals:    Short Term Goals:  Pt will participate in MBSS to fully assess oropharyngeal swallow function and to assist in determining the least restrictive PO diet to maintain adequate nutrition/hydration     Long Term Goals:   Pt will improve oropharyngeal swallow function to ensure airway protection during PO intake to maintain adequate nutrition/hydration and decrease signs/symptoms of aspiration to less than 1 x/day. Patient/family Goal:    Did not state. Will further assess during treatment. Plan of care discussed with Patient and Family   The Patient and Family understand(s) the diagnosis, prognosis and plan of care     Rehabilitation Potential/Prognosis: good                    ADMITTING DIAGNOSIS: Stroke, lacunar ([facility] [I63.81]  Acute CVA (cerebrovascular accident) ([facility] [I63.9]    VISIT DIAGNOSIS:   Visit Diagnoses         Codes    Acute CVA (cerebrovascular accident) ([facility]    -  Primary I63.9             PATIENT REPORT/COMPLAINT: difficulty swallowing and oral holding of foods  RN cleared patient for participation in assessment     yes     PRIOR LEVEL OF SWALLOW FUNCTION:    PAST HISTORY OF DYSPHAGIA?: yes    Home diet: Regular consistency solids (IDDSI level 7) with  thin liquids (IDDSI level 0)- NO Straws  Current Diet Order:  ADULT DIET; Regular; Low Sodium (2 gm)    PROCEDURE:  Consistencies Administered During the Evaluation   Liquids: thin liquid   Solids:  pureed foods      Method of Intake:   cup, spoon  Fed by clinician      Position:   Seated, upright    CLINICAL ASSESSMENT:  Oral Stage:        The oral stage of swallowing was within functional limits for consistencies administered      Pharyngeal Stage:    Wet respirations were noted after presentation of all consistencies administered  Wet/gurgly vocal quality was noted after presentation of all consistencies administered  Multiple swallows were noted after presentation of all consistencies administered    Cognition:   Within functional limits for this exam    Oral Peripheral Examination   Generalized oral weakness    Current Respiratory Status    room air     Parameters of Speech Production  Respiration:  Adequate for speech production  Quality:   Harsh  Intensity: Within functional limits    Volitional Swallow: present Volitional Cough:   present     Pain: No pain reported. EDUCATION:   The Speech Language Pathologist (SLP) completed education regarding results of evaluation and that intervention is warranted at this time. Learner: Patient and Family  Education: Reviewed results and recommendations of this evaluation and Reviewed diet and strategies  Evaluation of Education:  Ever Avrilk understanding    This plan may be re-evaluated and revised as warranted. Evaluation Time includes thorough review of current medical information, gathering information on past medical history/social history and prior level of function, completion of standardized testing/informal observation of tasks, assessment of data and education on plan of care and goals. [x]The admitting diagnosis and active problem list, have been reviewed prior to initiation of this evaluation.         ACTIVE PROBLEM LIST:   Patient Active Problem List   Diagnosis    Diabetic retinopathy ([address]    Anemia, chronic disease    Malignant neoplasm of right female breast ([address]    Atherosclerosis of native coronary artery of native heart without angina pectoris    Moderate obesity    Left ventricular hypertrophy    Herniated lumbar intervertebral disc    Lumbar degenerative disc disease    Constipation    Pseudomeningocele    Lumbar radiculopathy    Lymphedema of arm    Anemia of chronic disease    Chronic diastolic CHF (congestive heart failure) ([facility])    Hypertension    Glaucoma    Refusal of blood product    Controlled type 2 diabetes mellitus with chronic kidney disease on chronic dialysis, with long-term current use of insulin (HCC)    Vitreous hemorrhage ([address]    Patient is [employer]    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 (HCC)    Chronic pain syndrome    Lumbar post-laminectomy syndrome    Cervicalgia    Diabetic peripheral neuropathy (HCC)    ESRD (end stage renal disease) (HCC)    Bilateral carpal tunnel syndrome Cardiac arrest ([facility]    ESRD (end stage renal disease) on dialysis ([facility]    Mixed hyperlipidemia    Lymphedema of right upper extremity    Coronary artery disease involving native coronary artery of native heart with angina pectoris ([facility] [facility]    Mitral valve disease    CAD in native artery    Lumbar stenosis without neurogenic claudication    Intractable low back pain    Unable to ambulate    NSTEMI (non-ST elevated myocardial infarction) (HCC)    Moderate protein-calorie malnutrition (HCC)    Ischemic cardiomyopathy    Lower abdominal pain    Hypotension    Vertebral osteomyelitis, chronic ([facility])    Symptomatic sinus bradycardia    Status post amputation of right great toe [facility])    Stroke, lacunar ([facility]         CPT code:  50961  bedside swallow eval    INTERVENTION  CPT Code: 73477  dysphagia tx    Reviewed results/ recommendations with Pt from this clinical swallow eval as well as Pt's previous MBSS in June of 2022. Osteophytes noted then, reviewed with Pt. She reports she does not feel her swallowing is the same right now and is agreeable to an MBSS. All questions/ concerns addressed by SLP or RN to Pt and her brother at the bedside. Will complete MBSS when able.      Cabrera Garcia M.STanisha, [address] Pathologist  [id number]  8/2/2022

## 2022-08-02 NOTE — FLOWSHEET NOTE
Inpatient Wound Care(initial evaluation)  5615R    Admit Date: 8/1/2022 11:42 AM    Reason for consult:  right chest buttocks    Significant history:  per H & P  Patient presents with    Fall       Patient states bending over to fix the carpet, slipped and hit head on floor. Denies any LOC, answers questions appropriately   PMH includes diabetes, ESRD, CAD, inability to ambulate presenting today after she fell. She does not walk at baseline although states that she was cleaning out of bed to fix a rolled up carpet and fell landing on the left side of her forehead. She is on Brilinta. She is having mild tenderness palpation over her left eyebrow, nothing is made it better or worse, not associated with headaches, vision changes, midline tenderness in her cervical, thoracic, lumbar spine. She is able to move her extremities at her baseline and is alert and oriented x4. She is not any other acute complaints today.     Wound history:  admitted with wound from home    Findings:    08/02/22 1330   Skin Integumentary    Skin Condition/Temp Dry;Flaky   Skin Integrity Ecchymosis  (edematous)   Location BUE   Skin Integrity Site 2   Skin Integrity Location 2   (old healed areas)   Location 2 legs, feet, coccyx, buttocks   Skin Integrity Site 3   Skin Integrity Location 3 Vascular discoloration  (dry flaky)    Location 3 BLE   Skin Integrity Site 4   Skin Integrity Location 4 Erosion/denuded  (dry flaky)   Location 4 buttocks   Wound 07/15/22 Chest Right   Date First Assessed/Time First Assessed: 07/15/22 1529   Location: Chest  Wound Location Orientation: Right   Wound Image    Dressing/Treatment Alginate  (stratasorb- nurse to apply as product not available on floor- DSD applied at this time)   Wound Length (cm) 2 cm   Wound Width (cm) 1.6 cm   Wound Depth (cm) 0.8 cm   Wound Surface Area (cm^2) 3.2 cm^2   Change in Wound Size % (l*w) -220   Wound Volume (cm^3) 2.56 cm^3   Wound Healing % -412   Wound Assessment Pink/red Drainage Amount None   Maria Esther-wound Assessment   (dark chronic discoloration)   Previous amputation right first toe  **Informed Consent**    photos taken of wound and inserted into their chart as part of their permanent medical record for purposes of documentation, treatment management and/or medical review. All Images taken on 8/2/22 of patient name: Siria Miami were transmitted and stored on secured [employer] AMDL located within [facility] by a registered [employer] Mobile Application Device. Plan:   Order for tubi  RUE- size E provided after measured by nurse.  Nurse to apply  Heelmedix boots  Aquaphor to buttocks, coccyx, dry skin  Will need continued preventative care    Nicho Payne RN 8/2/2022 2:36 PM

## 2022-08-02 NOTE — SIGNIFICANT EVENT
Critical Care - Rapid Response Team Note      Date of event: 8/2/2022   Time of event: [address] [place] 78y.o. year old female   YOB: 1955     PCP:  Jad Arellano DO   Location: [address] [place] [address]   Witnessed? : [x]Yes  [] No  Initial Code status: [x] Full  [] DNR-CCA  []DNR-CC    []Limited  ______________________________________________________________________  Reason for RRT:   Other: hypotension    Chief Complaint for this admission:   Chief Complaint   Patient presents with    Altered Mental Status     LKW unknown. From dialysis, originally from [employer]. Sent in from dialysis d/t slow responses. Had full treatment. BGL 88        Admit date:  8/1/2022     Admitting Diagnosis: Stroke, lacunar ([facility] [I63.81]  Acute CVA (cerebrovascular accident) ([facility] [I63.9]        Subjective: Patient has a PMHx of ESRD on HD (Monday, Wednesday, Friday), hypotension on midodrine, diabetes, hyperlipidemia recent vertebral osteomyelitis s/p antibiotics. RRT was called due to hypotension. This was the second RRT for the patient for hypotension over the past 24 hours. Of note, speech evaluated the patient and recommended patient be n.p.o., thus she was not able to get some doses of her midodrine. MAP was 50-60 during RRT despite total fluid bolus of  cc. Contacted intensivist on-call who accepted patient for transfer to MICU. Objective:   Initial Assessment on arrival:  Vital signs: BP: 60-70/30-40, RR: 16, HR: 50s SpO2:95%    Airway and Condition: Conscious, Pulse present, Breathing, and Airway Open/Clear noted    Lungs And Circulation: clear to auscultation bilaterally noted                  Neurologic: pupils reactive, follows commands, and motor strength equal  noted    Initial Interventions: Labs ordered: CBC, BMP, lactic acid, procal  Imaging ordered: CXR for NG placement  Meds/Fluids/Rx given:    cc, NG tube placement       RRT Assessment and Plan:    RRT was called on 8/2/2022 .

## 2022-08-03 ENCOUNTER — APPOINTMENT (OUTPATIENT)
Dept: CT IMAGING | Age: 67
DRG: 064 | End: 2022-08-03
Payer: COMMERCIAL

## 2022-08-03 ENCOUNTER — APPOINTMENT (OUTPATIENT)
Dept: GENERAL RADIOLOGY | Age: 67
DRG: 064 | End: 2022-08-03
Payer: COMMERCIAL

## 2022-08-03 PROBLEM — I63.9 ACUTE CVA (CEREBROVASCULAR ACCIDENT) (HCC): Status: ACTIVE | Noted: 2022-08-03

## 2022-08-03 LAB
AADO2: 181.9 MMHG
AADO2: 201.6 MMHG
ANION GAP SERPL CALCULATED.3IONS-SCNC: 11 MMOL/L (ref 7–16)
ANISOCYTOSIS: ABNORMAL
B.E.: -0.2 MMOL/L (ref -3–3)
B.E.: -4.5 MMOL/L (ref -3–3)
B.E.: 0 MMOL/L (ref -3–3)
BASOPHILIC STIPPLING: ABNORMAL
BASOPHILS ABSOLUTE: 0 E9/L (ref 0–0.2)
BASOPHILS RELATIVE PERCENT: 0 % (ref 0–2)
BUN BLDV-MCNC: 12 MG/DL (ref 6–23)
CALCIUM SERPL-MCNC: 8.5 MG/DL (ref 8.6–10.2)
CHLORIDE BLD-SCNC: 103 MMOL/L (ref 98–107)
CO2: 23 MMOL/L (ref 22–29)
COHB: 0.9 % (ref 0–1.5)
COHB: 1.1 % (ref 0–1.5)
COHB: 1.4 % (ref 0–1.5)
CREAT SERPL-MCNC: 2.4 MG/DL (ref 0.5–1)
CRITICAL: ABNORMAL
DATE ANALYZED: ABNORMAL
DATE OF COLLECTION: ABNORMAL
EOSINOPHILS ABSOLUTE: 0 E9/L (ref 0.05–0.5)
EOSINOPHILS RELATIVE PERCENT: 0.4 % (ref 0–6)
FIO2: 60 %
FIO2: 60 %
GFR AFRICAN AMERICAN: 24
GFR NON-AFRICAN AMERICAN: 24 ML/MIN/1.73
GLUCOSE BLD-MCNC: 85 MG/DL (ref 74–99)
HCO3: 21.7 MMOL/L (ref 22–26)
HCO3: 22.4 MMOL/L (ref 22–26)
HCO3: 23.2 MMOL/L (ref 22–26)
HCT VFR BLD CALC: 25.2 % (ref 34–48)
HEMOGLOBIN: 8.1 G/DL (ref 11.5–15.5)
HHB: 0.6 % (ref 0–5)
HHB: 0.7 % (ref 0–5)
HHB: 7 % (ref 0–5)
HYPOCHROMIA: ABNORMAL
LAB: ABNORMAL
LACTIC ACID: 0.7 MMOL/L (ref 0.5–2.2)
LYMPHOCYTES ABSOLUTE: 0.11 E9/L (ref 1.5–4)
LYMPHOCYTES RELATIVE PERCENT: 3.5 % (ref 20–42)
Lab: ABNORMAL
MAGNESIUM: 1.7 MG/DL (ref 1.6–2.6)
MCH RBC QN AUTO: 28 PG (ref 26–35)
MCHC RBC AUTO-ENTMCNC: 32.1 % (ref 32–34.5)
MCV RBC AUTO: 87.2 FL (ref 80–99.9)
METAMYELOCYTES RELATIVE PERCENT: 0.9 % (ref 0–1)
METER GLUCOSE: 121 MG/DL (ref 74–99)
METER GLUCOSE: 132 MG/DL (ref 74–99)
METER GLUCOSE: 48 MG/DL (ref 74–99)
METER GLUCOSE: 62 MG/DL (ref 74–99)
METER GLUCOSE: 89 MG/DL (ref 74–99)
METER GLUCOSE: 93 MG/DL (ref 74–99)
METER GLUCOSE: 95 MG/DL (ref 74–99)
METHB: 0.4 % (ref 0–1.5)
MODE: ABNORMAL
MODE: AC
MODE: AC
MONOCYTES ABSOLUTE: 0.06 E9/L (ref 0.1–0.95)
MONOCYTES RELATIVE PERCENT: 1.7 % (ref 2–12)
MYELOCYTE PERCENT: 0.9 % (ref 0–0)
NEUTROPHILS ABSOLUTE: 2.66 E9/L (ref 1.8–7.3)
NEUTROPHILS RELATIVE PERCENT: 93 % (ref 43–80)
NUCLEATED RED BLOOD CELLS: 7.8 /100 WBC
O2 SATURATION: 91.7 % (ref 92–98.5)
O2 SATURATION: 99.3 % (ref 92–98.5)
O2 SATURATION: 99.5 % (ref 92–98.5)
O2HB: 91.2 % (ref 94–97)
O2HB: 97.8 % (ref 94–97)
O2HB: 98.1 % (ref 94–97)
OPERATOR ID: 1210
OPERATOR ID: 1868
OPERATOR ID: 2593
OVALOCYTES: ABNORMAL
PATIENT TEMP: 37 C
PCO2: 28.8 MMHG (ref 35–45)
PCO2: 31.6 MMHG (ref 35–45)
PCO2: 45.2 MMHG (ref 35–45)
PDW BLD-RTO: 18 FL (ref 11.5–15)
PEEP/CPAP: 10 CMH2O
PEEP/CPAP: 8 CMH2O
PFO2: 2.94 MMHG/%
PFO2: 3.32 MMHG/%
PH BLOOD GAS: 7.3 (ref 7.35–7.45)
PH BLOOD GAS: 7.48 (ref 7.35–7.45)
PH BLOOD GAS: 7.51 (ref 7.35–7.45)
PHOSPHORUS: 3.1 MG/DL (ref 2.5–4.5)
PLATELET # BLD: 91 E9/L (ref 130–450)
PLATELET CONFIRMATION: NORMAL
PMV BLD AUTO: 12.1 FL (ref 7–12)
PO2: 176.4 MMHG (ref 75–100)
PO2: 199.2 MMHG (ref 75–100)
PO2: 68.1 MMHG (ref 75–100)
POIKILOCYTES: ABNORMAL
POLYCHROMASIA: ABNORMAL
POTASSIUM SERPL-SCNC: 3.8 MMOL/L (ref 3.5–5)
RBC # BLD: 2.89 E12/L (ref 3.5–5.5)
RI(T): 0.91
RI(T): 1.14
RR MECHANICAL: 14 B/MIN
RR MECHANICAL: 14 B/MIN
SODIUM BLD-SCNC: 137 MMOL/L (ref 132–146)
SOURCE, BLOOD GAS: ABNORMAL
TARGET CELLS: ABNORMAL
THB: 8.5 G/DL (ref 11.5–16.5)
THB: 8.6 G/DL (ref 11.5–16.5)
THB: 8.8 G/DL (ref 11.5–16.5)
TIME ANALYZED: 1631
TIME ANALYZED: 1843
TIME ANALYZED: 2351
VT MECHANICAL: 400 ML
VT MECHANICAL: 400 ML
WBC # BLD: 2.8 E9/L (ref 4.5–11.5)

## 2022-08-03 PROCEDURE — 36415 COLL VENOUS BLD VENIPUNCTURE: CPT

## 2022-08-03 PROCEDURE — 36620 INSERTION CATHETER ARTERY: CPT

## 2022-08-03 PROCEDURE — P9045 ALBUMIN (HUMAN), 5%, 250 ML: HCPCS | Performed by: INTERNAL MEDICINE

## 2022-08-03 PROCEDURE — 87077 CULTURE AEROBIC IDENTIFY: CPT

## 2022-08-03 PROCEDURE — 5A1955Z RESPIRATORY VENTILATION, GREATER THAN 96 CONSECUTIVE HOURS: ICD-10-PCS | Performed by: INTERNAL MEDICINE

## 2022-08-03 PROCEDURE — 6370000000 HC RX 637 (ALT 250 FOR IP): Performed by: INTERNAL MEDICINE

## 2022-08-03 PROCEDURE — 6360000002 HC RX W HCPCS: Performed by: INTERNAL MEDICINE

## 2022-08-03 PROCEDURE — 80048 BASIC METABOLIC PNL TOTAL CA: CPT

## 2022-08-03 PROCEDURE — 71045 X-RAY EXAM CHEST 1 VIEW: CPT

## 2022-08-03 PROCEDURE — 2500000003 HC RX 250 WO HCPCS

## 2022-08-03 PROCEDURE — 2580000003 HC RX 258: Performed by: INTERNAL MEDICINE

## 2022-08-03 PROCEDURE — 82805 BLOOD GASES W/O2 SATURATION: CPT

## 2022-08-03 PROCEDURE — 99291 CRITICAL CARE FIRST HOUR: CPT | Performed by: INTERNAL MEDICINE

## 2022-08-03 PROCEDURE — 6360000002 HC RX W HCPCS

## 2022-08-03 PROCEDURE — 84100 ASSAY OF PHOSPHORUS: CPT

## 2022-08-03 PROCEDURE — 85025 COMPLETE CBC W/AUTO DIFF WBC: CPT

## 2022-08-03 PROCEDURE — 82962 GLUCOSE BLOOD TEST: CPT

## 2022-08-03 PROCEDURE — 74018 RADEX ABDOMEN 1 VIEW: CPT

## 2022-08-03 PROCEDURE — 87186 SC STD MICRODIL/AGAR DIL: CPT

## 2022-08-03 PROCEDURE — 36592 COLLECT BLOOD FROM PICC: CPT

## 2022-08-03 PROCEDURE — 6370000000 HC RX 637 (ALT 250 FOR IP): Performed by: FAMILY MEDICINE

## 2022-08-03 PROCEDURE — 87070 CULTURE OTHR SPECIMN AEROBIC: CPT

## 2022-08-03 PROCEDURE — 6360000002 HC RX W HCPCS: Performed by: FAMILY MEDICINE

## 2022-08-03 PROCEDURE — 6370000000 HC RX 637 (ALT 250 FOR IP): Performed by: SPECIALIST

## 2022-08-03 PROCEDURE — 83735 ASSAY OF MAGNESIUM: CPT

## 2022-08-03 PROCEDURE — 83605 ASSAY OF LACTIC ACID: CPT

## 2022-08-03 PROCEDURE — 94002 VENT MGMT INPAT INIT DAY: CPT

## 2022-08-03 PROCEDURE — 71250 CT THORAX DX C-: CPT

## 2022-08-03 PROCEDURE — 74176 CT ABD & PELVIS W/O CONTRAST: CPT

## 2022-08-03 PROCEDURE — 0BH18EZ INSERTION OF ENDOTRACHEAL AIRWAY INTO TRACHEA, VIA NATURAL OR ARTIFICIAL OPENING ENDOSCOPIC: ICD-10-PCS | Performed by: INTERNAL MEDICINE

## 2022-08-03 PROCEDURE — 31500 INSERT EMERGENCY AIRWAY: CPT

## 2022-08-03 PROCEDURE — 2000000000 HC ICU R&B

## 2022-08-03 PROCEDURE — 99292 CRITICAL CARE ADDL 30 MIN: CPT | Performed by: INTERNAL MEDICINE

## 2022-08-03 PROCEDURE — 31500 INSERT EMERGENCY AIRWAY: CPT | Performed by: INTERNAL MEDICINE

## 2022-08-03 RX ORDER — DEXTROSE MONOHYDRATE 100 MG/ML
INJECTION, SOLUTION INTRAVENOUS CONTINUOUS PRN
Status: DISCONTINUED | OUTPATIENT
Start: 2022-08-03 | End: 2022-08-11 | Stop reason: HOSPADM

## 2022-08-03 RX ORDER — 0.9 % SODIUM CHLORIDE 0.9 %
500 INTRAVENOUS SOLUTION INTRAVENOUS ONCE
Status: COMPLETED | OUTPATIENT
Start: 2022-08-03 | End: 2022-08-03

## 2022-08-03 RX ORDER — EPINEPHRINE 0.1 MG/ML
SYRINGE (ML) INJECTION
Status: DISPENSED
Start: 2022-08-03 | End: 2022-08-04

## 2022-08-03 RX ORDER — EPINEPHRINE 1 MG/ML
INJECTION, SOLUTION, CONCENTRATE INTRAVENOUS
Status: DISCONTINUED
Start: 2022-08-03 | End: 2022-08-03 | Stop reason: WASHOUT

## 2022-08-03 RX ORDER — 0.9 % SODIUM CHLORIDE 0.9 %
250 INTRAVENOUS SOLUTION INTRAVENOUS ONCE
Status: DISCONTINUED | OUTPATIENT
Start: 2022-08-03 | End: 2022-08-04

## 2022-08-03 RX ORDER — ROCURONIUM BROMIDE 10 MG/ML
INJECTION, SOLUTION INTRAVENOUS
Status: COMPLETED
Start: 2022-08-03 | End: 2022-08-03

## 2022-08-03 RX ORDER — FENTANYL CITRATE 50 UG/ML
25 INJECTION, SOLUTION INTRAMUSCULAR; INTRAVENOUS ONCE
Status: DISCONTINUED | OUTPATIENT
Start: 2022-08-03 | End: 2022-08-04

## 2022-08-03 RX ORDER — POTASSIUM CHLORIDE 29.8 MG/ML
20 INJECTION INTRAVENOUS ONCE
Status: COMPLETED | OUTPATIENT
Start: 2022-08-03 | End: 2022-08-03

## 2022-08-03 RX ORDER — ROCURONIUM BROMIDE 10 MG/ML
1 INJECTION, SOLUTION INTRAVENOUS ONCE
Status: COMPLETED | OUTPATIENT
Start: 2022-08-03 | End: 2022-08-03

## 2022-08-03 RX ORDER — DOXYCYCLINE HYCLATE 100 MG/1
100 CAPSULE ORAL EVERY 12 HOURS SCHEDULED
Status: DISCONTINUED | OUTPATIENT
Start: 2022-08-03 | End: 2022-08-11 | Stop reason: HOSPADM

## 2022-08-03 RX ORDER — FENTANYL CITRATE 50 UG/ML
INJECTION, SOLUTION INTRAMUSCULAR; INTRAVENOUS
Status: COMPLETED
Start: 2022-08-03 | End: 2022-08-03

## 2022-08-03 RX ORDER — DEXTROSE MONOHYDRATE 50 MG/ML
INJECTION, SOLUTION INTRAVENOUS CONTINUOUS
Status: ACTIVE | OUTPATIENT
Start: 2022-08-03 | End: 2022-08-03

## 2022-08-03 RX ORDER — FENTANYL CITRATE 50 UG/ML
100 INJECTION, SOLUTION INTRAMUSCULAR; INTRAVENOUS ONCE
Status: COMPLETED | OUTPATIENT
Start: 2022-08-03 | End: 2022-08-03

## 2022-08-03 RX ORDER — ALBUMIN, HUMAN INJ 5% 5 %
25 SOLUTION INTRAVENOUS ONCE
Status: COMPLETED | OUTPATIENT
Start: 2022-08-03 | End: 2022-08-03

## 2022-08-03 RX ORDER — MIDODRINE HYDROCHLORIDE 10 MG/1
10 TABLET ORAL
Status: DISCONTINUED | OUTPATIENT
Start: 2022-08-03 | End: 2022-08-07

## 2022-08-03 RX ORDER — ETOMIDATE 2 MG/ML
0.3 INJECTION INTRAVENOUS ONCE
Status: COMPLETED | OUTPATIENT
Start: 2022-08-03 | End: 2022-08-03

## 2022-08-03 RX ORDER — ETOMIDATE 2 MG/ML
INJECTION INTRAVENOUS
Status: COMPLETED
Start: 2022-08-03 | End: 2022-08-03

## 2022-08-03 RX ORDER — PANTOPRAZOLE SODIUM 40 MG/1
40 TABLET, DELAYED RELEASE ORAL
Status: DISCONTINUED | OUTPATIENT
Start: 2022-08-03 | End: 2022-08-04

## 2022-08-03 RX ORDER — GABAPENTIN 100 MG/1
100 CAPSULE ORAL NIGHTLY
Status: DISCONTINUED | OUTPATIENT
Start: 2022-08-04 | End: 2022-08-11 | Stop reason: HOSPADM

## 2022-08-03 RX ADMIN — PETROLATUM: 420 OINTMENT TOPICAL at 09:50

## 2022-08-03 RX ADMIN — DOXYCYCLINE HYCLATE 100 MG: 100 CAPSULE ORAL at 21:57

## 2022-08-03 RX ADMIN — DEXTROSE MONOHYDRATE 125 ML: 100 INJECTION, SOLUTION INTRAVENOUS at 09:52

## 2022-08-03 RX ADMIN — SODIUM CHLORIDE, PRESERVATIVE FREE 10 ML: 5 INJECTION INTRAVENOUS at 21:58

## 2022-08-03 RX ADMIN — ASPIRIN 81 MG: 81 TABLET, COATED ORAL at 13:30

## 2022-08-03 RX ADMIN — BRIMONIDINE TARTRATE 1 DROP: 2 SOLUTION OPHTHALMIC at 09:50

## 2022-08-03 RX ADMIN — TICAGRELOR 90 MG: 90 TABLET ORAL at 21:58

## 2022-08-03 RX ADMIN — MIDODRINE HYDROCHLORIDE 10 MG: 10 TABLET ORAL at 12:15

## 2022-08-03 RX ADMIN — FENTANYL CITRATE 100 MCG: 50 INJECTION, SOLUTION INTRAMUSCULAR; INTRAVENOUS at 17:23

## 2022-08-03 RX ADMIN — TICAGRELOR 90 MG: 90 TABLET ORAL at 13:30

## 2022-08-03 RX ADMIN — ALLOPURINOL 100 MG: 100 TABLET ORAL at 09:58

## 2022-08-03 RX ADMIN — MIDODRINE HYDROCHLORIDE 10 MG: 10 TABLET ORAL at 03:17

## 2022-08-03 RX ADMIN — POTASSIUM CHLORIDE 20 MEQ: 29.8 INJECTION, SOLUTION INTRAVENOUS at 06:30

## 2022-08-03 RX ADMIN — PIPERACILLIN AND TAZOBACTAM 4500 MG: 4; .5 INJECTION, POWDER, FOR SOLUTION INTRAVENOUS at 17:05

## 2022-08-03 RX ADMIN — HEPARIN SODIUM 5000 UNITS: 10000 INJECTION INTRAVENOUS; SUBCUTANEOUS at 21:58

## 2022-08-03 RX ADMIN — SERTRALINE 25 MG: 50 TABLET, FILM COATED ORAL at 09:59

## 2022-08-03 RX ADMIN — GABAPENTIN 100 MG: 100 CAPSULE ORAL at 09:58

## 2022-08-03 RX ADMIN — BRIMONIDINE TARTRATE 1 DROP: 2 SOLUTION OPHTHALMIC at 21:58

## 2022-08-03 RX ADMIN — ETOMIDATE 20 MG: 2 INJECTION INTRAVENOUS at 17:22

## 2022-08-03 RX ADMIN — PETROLATUM: 420 OINTMENT TOPICAL at 21:58

## 2022-08-03 RX ADMIN — DEXTROSE MONOHYDRATE: 50 INJECTION, SOLUTION INTRAVENOUS at 10:34

## 2022-08-03 RX ADMIN — HEPARIN SODIUM 5000 UNITS: 10000 INJECTION INTRAVENOUS; SUBCUTANEOUS at 13:30

## 2022-08-03 RX ADMIN — MIDODRINE HYDROCHLORIDE 10 MG: 10 TABLET ORAL at 17:03

## 2022-08-03 RX ADMIN — ROCURONIUM BROMIDE 91 MG: 10 INJECTION, SOLUTION INTRAVENOUS at 17:23

## 2022-08-03 RX ADMIN — ATORVASTATIN CALCIUM 80 MG: 40 TABLET, FILM COATED ORAL at 21:57

## 2022-08-03 RX ADMIN — PIPERACILLIN AND TAZOBACTAM 4500 MG: 4; .5 INJECTION, POWDER, FOR SOLUTION INTRAVENOUS at 11:00

## 2022-08-03 RX ADMIN — ETOMIDATE 20 MG: 20 INJECTION, SOLUTION INTRAVENOUS at 17:22

## 2022-08-03 RX ADMIN — MIDODRINE HYDROCHLORIDE 10 MG: 10 TABLET ORAL at 09:59

## 2022-08-03 RX ADMIN — GABAPENTIN 100 MG: 100 CAPSULE ORAL at 13:34

## 2022-08-03 RX ADMIN — Medication 5 MCG/MIN: at 17:20

## 2022-08-03 RX ADMIN — ALBUMIN (HUMAN) 25 G: 12.5 INJECTION, SOLUTION INTRAVENOUS at 12:02

## 2022-08-03 RX ADMIN — HYDROCORTISONE SODIUM SUCCINATE 100 MG: 100 INJECTION, POWDER, FOR SOLUTION INTRAMUSCULAR; INTRAVENOUS at 18:55

## 2022-08-03 RX ADMIN — VANCOMYCIN HYDROCHLORIDE 1250 MG: 10 INJECTION, POWDER, LYOPHILIZED, FOR SOLUTION INTRAVENOUS at 20:07

## 2022-08-03 RX ADMIN — LATANOPROST 1 DROP: 50 SOLUTION OPHTHALMIC at 21:58

## 2022-08-03 RX ADMIN — SODIUM CHLORIDE 500 ML: 9 INJECTION, SOLUTION INTRAVENOUS at 02:19

## 2022-08-03 RX ADMIN — DEXTROSE MONOHYDRATE 125 ML: 100 INJECTION, SOLUTION INTRAVENOUS at 02:18

## 2022-08-03 ASSESSMENT — PULMONARY FUNCTION TESTS
PIF_VALUE: 28
PIF_VALUE: 28
PIF_VALUE: 23
PIF_VALUE: 25
PIF_VALUE: 27
PIF_VALUE: 23
PIF_VALUE: 22
PIF_VALUE: 23
PIF_VALUE: 27
PIF_VALUE: 23
PIF_VALUE: 29
PIF_VALUE: 28
PIF_VALUE: 27
PIF_VALUE: 26
PIF_VALUE: 27
PIF_VALUE: 22
PIF_VALUE: 27
PIF_VALUE: 0

## 2022-08-03 ASSESSMENT — PAIN SCALES - GENERAL
PAINLEVEL_OUTOF10: 0
PAINLEVEL_OUTOF10: 1
PAINLEVEL_OUTOF10: 2
PAINLEVEL_OUTOF10: 0

## 2022-08-03 ASSESSMENT — PAIN SCALES - WONG BAKER
WONGBAKER_NUMERICALRESPONSE: 0

## 2022-08-03 NOTE — PROCEDURES
Glidescope INTUBATION    Date: 08/03/22   Time: 5:40 PM    Indication: Respiratory Failure    A time-out was completed. The patient was placed in a flat position. Sedation was obtained using Etomidate 20mg, 100 mg fentanyl and 100 mg rocuronium, The patient was easily ventilated using an ambu bag. The glide blade was used and inserted into the oropharynx at which time there was a Grade 2 view of the vocal cords. A 7.5-Chinese endotracheal tube was inserted and visualized going through the vocal cords. The stylette was removed. Colorimetric change was visualized on the CO2 meter. Breath sounds were heard in both lung fields equally. The endotracheal tube was placed at 24 cm, measured at the teeth. The patient tolerated the procedure well and there were no immediate complications. A chest x-ray was ordered to verify endotracheal tube placement. The patient tolerated the procedure well and there were no complications.     Irma Roe MD MS  Pulmonary & [facility]

## 2022-08-03 NOTE — PROGRESS NOTES
Patient alert and orientedx 4. Patient was unable to have sufficient  and motor coordination to sign consent for treatment but was able to verbalize understanding.

## 2022-08-03 NOTE — CONSULTS
[facility]  Internal Medicine Residency Program  MICU Consult    Patient:  Mason Canales 78 y.o. female MRN: [id number]     Date of Service: 8/2/2022    Hospital Day: 2      Chief complaint: Hypotension    History Obtained From:  reason patient could not give history:  altered mental status    History of Present Illness   Mason Canales is a 78 y.o. female with a Pmhx of Type 2 DM managed with insulin, Chronic Vertebral Osteomyelitis, CAD s/p stent placement, ESRD on HD, HFrEF (EF 40-45%), Hyperuricemia, HLD, and hypotension. Patient is a [employer] witness and cannot receive blood products. She presented to the ED on 8/01 with AMS following completing her dialysis treatment. CT scan of the head in the ED revealed a lacunar infarct. She had NIHSS score of 3. She was admitted to telemetry for further evaluation and treatment. On 8/2/2022 AM, RRT was called for the patient due to hypotension. The patient had received dialysis and her Midodrine was also held that day. 500 cc IV fluid bolus was ordered, patients right arm IV was infiltrated and there was no access. Due to poor peripheral access, a left femoral cental line was placed. Midodrine was given which improved her BP. Her Bumex was held. On 8/2/2022 PM, another RRT was called for hypotension. Prior to the RRT, SLP had evaluated the patient and recommended she be put NPO due to dysphagia. The patient therefore missed her Midodrine 10mg PO dose. 500 CC IV bolus NS was given. A NG tube was placed and positions was confirmed with X-ray. She was given Midodrine 10mg PO through NG tube followed by 250 CC IV NS Bolus. The patient was transferred to the ICU for further evaluation and treatment. Previous Hospital Admission:   Patient was initially admitted to [facility] around June for treatment of vertebral osteomyelitis.  She was then discharge to a facility for 4 weeks and was treated with vancomycin and doxycyline. On 7/11/2022, she was admitted again to [facility] for shock. She was treated with vasopressors but no antibiotics were given during treatment. She was discharged on 7/21/2022 with a prescription of  Midodrine 10 mg TID. On 7/31/2022, she presented to the ED of [facility] after falling, slipping and hitting head on the floor. On 8/01, she presented to the ED of [facility] with CC of AMS. Past Medical History:       Diagnosis Date    Acute infection of bone ([address]     infection of rt foot, resolved.     Acute osteomyelitis of phalanx of left hand ([address] 1/27/2022    Left third distal phalanx    Anemia of chronic disease     Arthritis     Breast cancer ([address]     right breast, 2008/ bladder, 2006- last chemotherapy \"years ago\"    CAD (coronary artery disease)     Carpal tunnel syndrome     bilat - for OR left hand 3-17-20     Chronic diastolic CHF (congestive heart failure) ([address] 09/23/2014 9/23/14- echocardiogram revealed moderate LV concentric hypertrophy, stage III diastolic dysfunction, mild tricuspid regurgitation    CKD (chronic kidney disease) stage 4, GFR 15-29 ml/min (HCC)     Diabetic retinopathy ([address]     Glaucoma     Hemodialysis patient ([address]     [facility]- Dr. Bals Yates - left arm fistula     Hyperkalemia, diminished renal excretion 11/9/2017    Hyperlipidemia     Hypertension     Hypoglycemia unawareness in type 1 diabetes mellitus ([address] 11/7/2017    Insulin dependent type 2 diabetes mellitus ([address]     Neuropathy     feet    Osteomyelitis due to secondary diabetes ([address]     rt great toe with amputation    Patient is Mandaeism 11/7/2017    Refusal of blood product     patient states she dose not take blood transfusion    Ventricular hypertrophy     Ventricular tachycardia ([address] 5/24/2021    Vitreous hemorrhage (HCC)     left eye       Past Surgical History:        Procedure Laterality Date    AMPUTATION      right great toe ANKLE SURGERY      correction on charcot joint of right ankle    BONE BIOPSY N/A 04/18/2022    BONE BIOPSY PERCUTANEOUS L1/L2 performed by Sara Muñoz MD at [address]  12/09/2021    Dr Flor Medina Left 03/17/2020    LEFT CARPAL TUNNEL RELEASE performed by Ju Hernandez MD at [address]      bilateral    CHOLECYSTECTOMY      COLONOSCOPY      CORONARY ANGIOPLASTY  05/05/2022    Dr. Dara Johnson - RCA angioplasty    [address] Left 01/31/2018    upper arm/Dr. Amparo Ko    ECHO COMPL W DOP COLOR FLOW  02/14/2013         ECHO COMPLETE  09/17/2013         FINGER AMPUTATION Left 01/20/2022    AMPUTATION OF LEFT THIRD DIGIT, DISTAL PHALANX performed by Geetha Mcgarry MD at [address]      right    OTHER SURGICAL HISTORY  09/27/2011    PPV, membranectomy, laser Right eye    OTHER SURGICAL HISTORY  insertion lumbar drain insertion    10/12/`14    OTHER SURGICAL HISTORY  10/22/2015    percutaneous lead placement for spinal cord stimulator    OTHER SURGICAL HISTORY  11/03/2015    Spinal; cord stimulator- turned off as of 3-10-20     PORT SURGERY N/A 6/27/2022    PORT REMOVAL performed by Pamela Vaughn MD at [address] AV ANAST,UP ARM BASILIC VEIN TRANSPOSIT Left 05/15/2018    TRANSPOSITION STAGE II AV FISTULA - LEFT UPPER ARM performed by Greer Reinoso MD at [address] [facility] [address] ANGIOACCESS AV FISTULA Left 09/25/2018    SUPERFICIALIZATION AV FISTULA - LEFT ARM performed by Greer Reinoso MD at [address] [facility] [facility] W/VITRECTOMY ANY METH Left 04/10/2018    PARS PLANA VITRECTOMY 25 GAUGE RETINAL DETACHMENT REPAIR air fluid exchange, endolaser performed by Lizette Childs MD at [address] [facility]      L2    TRANSESOPHAGEAL ECHOCARDIOGRAM  11/11/2021    Dr. Drew Swenson    TRANSESOPHAGEAL ECHOCARDIOGRAM  04/19/2022        TUNNELED VENOUS CATHETER PLACEMENT  11/15/2017 VITRECTOMY Left 04/10/2018    PARS PLANA VITRECTOMY; RETINAL DETACHMENT REPAIR; GAS BUBBLE; LASER LEFT EYE       Medications Prior to Admission:    Prior to Admission medications    Medication Sig Start Date End Date Taking? Authorizing Provider   atorvastatin (LIPITOR) 80 MG tablet Take 80 mg by mouth at bedtime   Yes Historical Provider, MD   bisacodyl (DULCOLAX) 10 MG suppository Place 10 mg rectally daily as needed for Constipation   Yes Historical Provider, MD   gabapentin (NEURONTIN) 100 MG capsule Take 100 mg by mouth in the morning and 100 mg at noon and 100 mg before bedtime. Yes Historical Provider, MD   magnesium hydroxide (MILK OF MAGNESIA) 400 MG/5ML suspension Take 30 mLs by mouth daily as needed for Constipation   Yes Historical Provider, MD   vancomycin (VANCOCIN) 1000 MG/200ML SOLN Infuse 1,000 mg intravenously every 72 hours   Yes Historical Provider, MD   ondansetron (ZOFRAN) 4 MG tablet Take 4 mg by mouth every 6 hours as needed for Nausea or Vomiting   Yes Historical Provider, MD   sertraline (ZOLOFT) 25 MG tablet Take 25 mg by mouth in the morning. 7/26/22 8/2/22 Yes Historical Provider, MD   polyethylene glycol (GLYCOLAX) 17 g packet Take 17 g by mouth in the morning. 7/22/22 8/21/22  Ab Otoole MD   senna (SENOKOT) 8.6 MG tablet Take 1 tablet by mouth nightly as needed for Constipation 7/21/22 8/20/22  Ab Otoole MD   brimonidine (ALPHAGAN) 0.2 % ophthalmic solution Place 1 drop into the right eye in the morning and 1 drop before bedtime. 7/21/22   Ab Otoole MD   midodrine (PROAMATINE) 10 MG tablet Take 1 tablet by mouth in the morning and 1 tablet at noon and 1 tablet in the evening. Take with meals.  7/21/22   bA Otoole MD   acetaminophen (TYLENOL) 325 MG tablet Take 650 mg by mouth every 4 hours as needed for Pain    Historical Provider, MD   Acidophilus Lactobacillus CAPS Take 1 capsule by mouth every morning    Historical Provider, MD   aspirin 81 MG EC tablet Take 81 mg by mouth every morning    Historical Provider, MD   meclizine (ANTIVERT) 12.5 MG tablet Take 12.5 mg by mouth every 8 hours as needed for Dizziness    Historical Provider, MD   mirtazapine (REMERON) 7.5 MG tablet Take 7.5 mg by mouth nightly    Historical Provider, MD   oxyCODONE-acetaminophen (PERCOCET) 5-325 MG per tablet Take 2 tablets by mouth every 8 hours as needed for Pain. Historical Provider, MD   ZINC OXIDE, TOPICAL, (SECURA PROTECTIVE) 10 % CREA Apply 1 Dose topically 2 times daily Apply to buttocks    Historical Provider, MD   doxycycline hyclate (VIBRAMYCIN) 100 MG capsule Take 1 capsule by mouth every 12 hours 6/22/22 8/3/22  GOYO Soto - CNP   bumetanide (BUMEX) 2 MG tablet Take 2 mg by mouth See Admin Instructions Given Sunday,Tuesday,Thursday,    Historical Provider, MD   lidocaine-prilocaine (EMLA) 2.5-2.5 % cream Apply 1 Dose topically See Admin Instructions Given [facility] prior to Dialysis    Historical Provider, MD   metoprolol succinate (TOPROL XL) 25 MG extended release tablet Take 1 tablet by mouth daily 11/24/21 7/21/22  Bertha Alvares MD   allopurinol (ZYLOPRIM) 100 MG tablet Take 1 tablet by mouth daily 9/7/21   Bertha Alvares MD   ticagrelor (BRILINTA) 90 MG TABS tablet Take 1 tablet by mouth 2 times daily 9/7/21   Bertha Alvares MD   lanthanum (FOSRENOL) 1000 MG chewable tablet Take 1,000 mg by mouth 3 times daily (with meals)  8/9/21 7/21/22  Historical Provider, MD   B Complex-C-Folic Acid (BONI CAPS) 1 MG CAPS Take 1 mg by mouth nightly     Historical Provider, MD   omeprazole (PRILOSEC) 20 MG delayed release capsule Take 20 mg by mouth daily as needed (gerd)    Historical Provider, MD MCHUGH 0.01 % SOLN ophthalmic drops Place 1 drop into the right eye nightly  6/9/20   Historical Provider, MD       Allergies:  Furosemide    Social History:   TOBACCO:   reports that she has never smoked.  She has never used smokeless tobacco.  ETOH: reports no history of alcohol use. Family History:       Problem Relation Age of Onset    Breast Cancer Mother 61    Hypertension Mother     Heart Disease Father     Prostate Cancer Father     Breast Cancer Maternal Grandmother 61       REVIEW OF SYSTEMS:    Unable to collect history due to patient AMS. Physical Exam   VITAL SIGNS:  BP (!) 122/35   Pulse (!) 49   Temp (!) 96 °F (35.6 °C) (Temporal)   Resp (!) 5   SpO2 100%   Tmax over 24 hours:  Temp (24hrs), Av.6 °F (35.9 °C), Min:96 °F (35.6 °C), Max:97.2 °F (36.2 °C)      Patient Vitals for the past 6 hrs:   BP Temp Temp src Pulse Resp SpO2   22 1937 (!) 122/35 (!) 96 °F (35.6 °C) Temporal (!) 49 (!) 5 100 %   227 (!) 89/49 -- -- 50 16 100 %   22 185 (!) 83/45 -- -- (!) 48 16 99 %   22 185 87/60 -- -- (!) 47 15 99 %   22 1848 (!) 91/31 -- -- 53 -- 100 %   22 1843 (!) 95/39 -- -- 51 -- 100 %   22 (!) 86/53 -- -- 51 -- 100 %   22 1835 (!) 61/47 -- -- 51 20 99 %   22 (!) 92/30 -- -- 53 16 100 %   22 (!) 81/40 -- -- 50 16 97 %   22 (!) 64/27 -- -- (!) 49 14 96 %   22 (!) 75/36 -- -- 51 16 95 %   22 (!) 75/36 -- -- -- -- --   22 1645 (!) 92/47 -- -- 53 -- --       No intake or output data in the 24 hours ending 22  Wt Readings from Last 2 Encounters:   22 200 lb (90.7 kg)   22 226 lb 6.4 oz (102.7 kg)     There is no height or weight on file to calculate BMI. PHYSICAL EXAMINATION:  Physical Exam  HENT:      Head: Normocephalic and atraumatic. Cardiovascular:      Rate and Rhythm: Normal rate and regular rhythm. Pulmonary:      Effort: Pulmonary effort is normal.      Breath sounds: Normal breath sounds. Abdominal:      General: Bowel sounds are normal.      Palpations: Abdomen is soft. Musculoskeletal:         General: Normal range of motion. Cervical back: Normal range of motion. Lines     site day    Art line   None    TLC R Fem 1   PICC None    Hemoaccess Right Forearm - Fistula                     Additional Respiratory Assessments  Heart Rate: (!) 49  Resp: (!) 5  SpO2: 100 %    ABGs:   No results for input(s): PH, PCO2, PO2, HCO3, BE, O2SAT in the last 72 hours. Laboratory findings:  Complete Blood Count:   Recent Labs     08/02/22  0115 08/02/22 0625 08/02/22  1848   WBC 3.6* 3.0* 2.7*   HGB 8.3* 7.8* 7.6*   HCT 26.8* 25.3* 24.4*   PLT 91* 72* 81*        Last 3 Blood Glucose:   Recent Labs     08/02/22  0115 08/02/22 0625 08/02/22  1848   GLUCOSE 94 70* 131*        PT/INR:    Lab Results   Component Value Date/Time    PROTIME 11.0 08/01/2022 12:43 PM    PROTIME 10.4 02/15/2012 10:40 AM    INR 1.0 08/01/2022 12:43 PM     PTT:    Lab Results   Component Value Date/Time    APTT 45.6 08/01/2022 12:43 PM       Comprehensive Metabolic Profile:   Recent Labs     08/02/22 0115 08/02/22 0625 08/02/22  1848    139 141   K 3.2* 3.1* 3.3*    105 105   CO2 28 26 26   BUN 8 9 11   CREATININE 2.0* 1.9* 2.2*   GLUCOSE 94 70* 131*   CALCIUM 8.7 8.1* 8.6   PROT 5.1*  --   --    LABALBU 2.8*  --   --    BILITOT 0.3  --   --    ALKPHOS 161*  --   --    AST 65*  --   --    ALT 62*  --   --       Magnesium:   Lab Results   Component Value Date/Time    MG 1.6 08/02/2022 06:25 AM     Phosphorus:   Lab Results   Component Value Date/Time    PHOS 2.6 08/02/2022 01:15 AM     Ionized Calcium:   Lab Results   Component Value Date/Time    CAION 1.22 11/08/2017 04:28 AM      Troponin: No results for input(s): TROPONINI in the last 72 hours.        Imaging Studies        CT ABDOMEN PELVIS W WO CONTRAST Additional Contrast? Oral    Result Date: 7/17/2022  EXAMINATION: CT OF THE ABDOMEN AND PELVIS WITH AND WITHOUT CONTRAST 7/17/2022 5:44 pm TECHNIQUE: CT of the abdomen and pelvis was performed with and without the administration of intravenous contrast.  Multiplanar reformatted images are provided for review. Automated exposure control, iterative reconstruction, and/or weight based adjustment of the mA/kV was utilized to reduce the radiation dose to as low as reasonably achievable. COMPARISON: CT abdomen pelvis 06/21/2022 HISTORY: ORDERING SYSTEM PROVIDED HISTORY: abdominal pain TECHNOLOGIST PROVIDED HISTORY: Pt is a dialysis patient who will receive dialysis today Reason for exam:->abdominal pain Additional Contrast?->Oral FINDINGS: Lower Chest: Coronary disease. Enlarged pulmonary trunk. Trace left-greater-than-right pleural effusions, new from prior. Areas of atelectasis in the visualized lungs. Organs: Status post cholecystectomy. Thickened adrenal glands. Atrophic kidneys with bilateral cysts. Subcentimeter low-density foci in the kidneys are too small to accurately characterize. No acute pancreatic abnormality. GI/Bowel: No free air. Trace free fluid in the presacral region. Mild colonic diverticulosis. No bowel obstruction. Noninflamed appendix. Pelvis: Decreased bladder wall thickening. Unremarkable reproductive organs. Peritoneum/Retroperitoneum: No abdominal aortic aneurysm. Extensive atherosclerotic disease. Bones/Soft Tissues: Anasarca. Wide-mouth ventral abdominal wall hernia. Spinal fusion hardware. Abnormal mineralization of the bones suggestive of an osteodystrophy such as renal osteodystrophy. Spinal stimulator noted. Stable erosions at L1-2 and L2-3 endplates. Scattered Schmorl's nodes again noted. Stable lumbar spine endplate erosions. This may represent dialysis related spondyloarthropathy. MRI lumbar spine would better differentiate from infectious discitis-osteomyelitis, if clinically indicated. Trace pleural effusions, new from prior. Anasarca. Decreased bladder wall thickening; correlate with urinalysis to evaluate for UTI. No evidence of pyelonephritis. See other incidental findings above.      CT Head WO Contrast    Result Date: 8/1/2022  EXAMINATION: CT OF THE HEAD WITHOUT CONTRAST  8/1/2022 1:09 pm TECHNIQUE: CT of the head was performed without the administration of intravenous contrast. Automated exposure control, iterative reconstruction, and/or weight based adjustment of the mA/kV was utilized to reduce the radiation dose to as low as reasonably achievable. COMPARISON: 07/31/2022 noncontrast head CT. HISTORY: ORDERING SYSTEM PROVIDED HISTORY: altered mental status TECHNOLOGIST PROVIDED HISTORY: Reason for exam:->altered mental status Has a \"code stroke\" or \"stroke alert\" been called? ->No Decision Support Exception - unselect if not a suspected or confirmed emergency medical condition->Emergency Medical Condition (MA) What reading provider will be dictating this exam?->CRC FINDINGS: BRAIN/VENTRICLES: The ventricles are normal size, position and contour. Cortical sulci and sylvian fissures are mildly prominent. There are no masses or mass effect. There are no parenchymal hemorrhages or extra-axial fluid collections. There is a small hypodense area in the right jerardo not demonstrated previously. The cerebellum is unremarkable. ORBITS: The visualized portion of the orbits demonstrate no acute abnormality. SINUSES: The visualized paranasal sinuses and mastoid air cells demonstrate no acute abnormality. SOFT TISSUES/SKULL:  No definite fractures. There is a small left periorbital soft tissue contusion and hematoma, stable to slightly improved. Small hypodensity right jerardo not demonstrated previously. Acute or subacute lacunar infarct at this location cannot be excluded. This finding could be more fully evaluated with MRI.      CT Head WO Contrast    Result Date: 7/31/2022  EXAMINATION: CT OF THE HEAD WITHOUT CONTRAST  7/31/2022 6:57 am TECHNIQUE: CT of the head was performed without the administration of intravenous contrast. Automated exposure control, iterative reconstruction, and/or weight based adjustment of the mA/kV was utilized to reduce the radiation dose to as low as reasonably achievable. COMPARISON: None. HISTORY: ORDERING SYSTEM PROVIDED HISTORY: fall TECHNOLOGIST PROVIDED HISTORY: Reason for exam:->fall Has a \"code stroke\" or \"stroke alert\" been called? ->No Decision Support Exception - unselect if not a suspected or confirmed emergency medical condition->Emergency Medical Condition (MA) FINDINGS: BRAIN/VENTRICLES: Physiologic calcifications in the basal ganglia bilaterally. There is no acute intracranial hemorrhage, mass effect or midline shift. No abnormal extra-axial fluid collection. The gray-white differentiation is maintained without evidence of an acute infarct. There is no evidence of hydrocephalus. ORBITS: The visualized portion of the orbits demonstrate no acute abnormality. SINUSES: The visualized paranasal sinuses and mastoid air cells demonstrate no acute abnormality. SOFT TISSUES/SKULL:  No acute abnormality of the visualized skull. Scalp hematoma overlying the left orbit. No acute intracranial abnormality. Small scalp hematoma overlying the left orbit     CT HEAD WO CONTRAST    Result Date: 7/20/2022  EXAMINATION: CT OF THE HEAD WITHOUT CONTRAST  7/20/2022 8:30 pm TECHNIQUE: CT of the head was performed without the administration of intravenous contrast. Automated exposure control, iterative reconstruction, and/or weight based adjustment of the mA/kV was utilized to reduce the radiation dose to as low as reasonably achievable. COMPARISON: 06/15/2022 HISTORY: ORDERING SYSTEM PROVIDED HISTORY: worsening hallucinations, hypothermia TECHNOLOGIST PROVIDED HISTORY: Reason for exam:->worsening hallucinations, hypothermia Has a \"code stroke\" or \"stroke alert\" been called? ->No FINDINGS: BRAIN/VENTRICLES: There is no acute intracranial hemorrhage, mass effect or midline shift. No abnormal extra-axial fluid collection. The gray-white differentiation is maintained without evidence of an acute infarct.  There is prominence of the ventricles and sulci due to global parenchymal volume loss. There are nonspecific areas of hypoattenuation within the periventricular and subcortical white matter, which likely represent chronic microvascular ischemic change. ORBITS: The visualized portion of the orbits demonstrate no acute abnormality. SINUSES: The visualized paranasal sinuses and mastoid air cells demonstrate no acute abnormality. There is a small mucous retention cyst or polyp in the right maxillary sinus. SOFT TISSUES/SKULL: No acute abnormality of the visualized skull or soft tissues. No acute intracranial abnormality. No significant interval change from previous exam with no evidence of hemorrhage. CT Cervical Spine WO Contrast    Result Date: 7/31/2022  EXAMINATION: CT OF THE CERVICAL SPINE WITHOUT CONTRAST 7/31/2022 6:57 am TECHNIQUE: CT of the cervical spine was performed without the administration of intravenous contrast. Multiplanar reformatted images are provided for review. Automated exposure control, iterative reconstruction, and/or weight based adjustment of the mA/kV was utilized to reduce the radiation dose to as low as reasonably achievable. COMPARISON: None. HISTORY: ORDERING SYSTEM PROVIDED HISTORY: fall TECHNOLOGIST PROVIDED HISTORY: Reason for exam:->fall Decision Support Exception - unselect if not a suspected or confirmed emergency medical condition->Emergency Medical Condition (MA) FINDINGS: BONES/ALIGNMENT: There is no acute fracture or traumatic malalignment. The odontoid tip is unremarkable. The lateral masses are well aligned. DEGENERATIVE CHANGES: Anterior bridging osteophyte formation seen from C3 through C7. There is chronic loss of height of the vertebral bodies and disc space narrowing with Schmorl's nodes seen within the superior endplate of C5. Facets are well aligned. Pedicles are intact. SOFT TISSUES: There is no prevertebral soft tissue swelling.      Multilevel degenerative changes seen within the cervical spine with no acute abnormality of the cervical spine. NM GASTRIC EMPTYING    Result Date: 7/21/2022  EXAMINATION: NUCLEAR MEDICINE GASTRIC EMPTYING STUDY 7/21/2022 7:56 am TECHNIQUE: Following ingestion of a standard solid meal labeled with 1.0 mCi Tc-99m sulfur colloid, planar images of the chest and abdomen were obtained at 0, 1, 2 and 4 hours in both anterior and posterior projections. Regions of interest were drawn around the stomach and geometric means were calculated. All medications capable of altering motility were held. COMPARISON: None. HISTORY: ORDERING SYSTEM PROVIDED HISTORY: fullness, abdominal pain TECHNOLOGIST PROVIDED HISTORY: Reason for exam:->fullness, abdominal pain What reading provider will be dictating this exam?->CRC FINDINGS: The gastric emptying were calculated as follows, extrapolated from the time activity curve: At 60 min, there is 90% emptying of the stomach. (Normal range is 10-70%) At 120 min, there is 100% emptying of the stomach. (Normal is >40%) At 240 min, there is 100% emptying of the stomach. (Normal is >90%) No significant esophageal retention, but reflux was noted at 1 hour. Rapid early phase emptying which can be related to diabetes. No evidence of gastroparesis. However, reflux noted at 1 hour. XR CHEST PORTABLE    Result Date: 8/1/2022  EXAMINATION: ONE XRAY VIEW OF THE CHEST 8/1/2022 12:56 pm COMPARISON: Previous chest x-ray of 07/11/2022 and CT scan of 06/15/2022 HISTORY: ORDERING SYSTEM PROVIDED HISTORY: altered TECHNOLOGIST PROVIDED HISTORY: Reason for exam:->altered What reading provider will be dictating this exam?->CRC FINDINGS: The cardiac silhouette is within normal limits. The right lung is clear. There is chronic elevation right hemidiaphragm There is a very small patchy opacity seen within the left lung base which could represent atelectasis or less likely pneumonia. The left upper lobe is clear.      1. Small patchy opacity within the left lung base which could represent atelectasis or pneumonia. Atelectasis is favored. 2. The right lung is clear. XR CHEST PORTABLE    Result Date: 7/11/2022  EXAMINATION: ONE XRAY VIEW OF THE CHEST 7/11/2022 1:23 am COMPARISON: 06/15/2022 HISTORY: ORDERING SYSTEM PROVIDED HISTORY: hypotension TECHNOLOGIST PROVIDED HISTORY: Reason for exam:->hypotension FINDINGS: The lungs are without acute focal process. There is no effusion or pneumothorax. The cardiomediastinal silhouette is without acute process. The osseous structures are without acute process. No acute process. Resident's Assessment and Plan     Yudi Guadalupe is a 78 y.o. female came with   has a past medical history of Acute infection of bone ([address] Acute osteomyelitis of phalanx of left hand ([address] Anemia of chronic disease, Arthritis, Breast cancer ([address] CAD (coronary artery disease), Carpal tunnel syndrome, Chronic diastolic CHF (congestive heart failure) ([address] CKD (chronic kidney disease) stage 4, GFR 15-29 ml/min ([address] Diabetic retinopathy ([address] Glaucoma, Hemodialysis patient ([address] Hyperkalemia, diminished renal excretion, Hyperlipidemia, Hypertension, Hypoglycemia unawareness in type 1 diabetes mellitus ([address] Insulin dependent type 2 diabetes mellitus ([address] Neuropathy, Osteomyelitis due to secondary diabetes [facility]), Patient is Quaker, Refusal of blood product, Ventricular hypertrophy, Ventricular tachycardia ([address] and Vitreous hemorrhage ([address]       Assessment:    Neurology     Acute VS subacute lacunar stroke  Patient presented to the ED with AMS   CT Scan WO contrast reveals Small hypodensity on the right jerardo  Plan  Continue dual antiplatelet management with aspirin 81 mg daily and ticagrelor 90 mg twice daily      Cardiology    Hypotension due to missed midodrine dose vs r/o sepsis 2/2 unknown cause  Patient missed midodrine dose prior to RRT being called for hypotension  Patient on 10mg Midodrine TID for hypotension  Patient received 500CC and 250 CC NS bolus   WBC 2.7  Temp. 96  Procalcitonin levels 0.27  Plan  Continue Midodrine 10 mg PO TID  Consider Femoral arterial line insertion for possible need for vasopressor management   Follow U/A   Follow CBC  Follow Blood cultures  Follow urine culture    CAD s/p stent placement  Plan  Continue dual antiplatelet management with aspirin 81 mg daily and ticagrelor 90 mg twice daily      HLD  Plan  Continue Lipitor 80 mg       Hx HFrEF (EF 40-45%)  Plan  Hold Bumex due to hypotension      Endocrinology       History of type II DM managed with insulin  Home regiment of Lantus 5U QHS      Hx of Chronic Vertebral Osteomyelitis       Nephrology       ERIKA on top of ESRD   Cr levels 2.2, trending up  Plan  Nephrology Consultation  Follow BMP       ESRD on HD HFrEF (EF 40-45%)  On HD M/W/F      Hyperuricemia  Plan  Continue Allopurinol 100 mg PO qday      PT/OT: -/-  DVT ppx: Heparin held due to low platelet and bleeding form femoral line    GI ppx: NPO      Code Status:   Full         Glo Peoples MD, PGY-1  Attending physician: Dr. Misty James    NOTE: This report was transcribed using voice recognition software. Every effort was made to ensure accuracy; however, inadvertent computerized transcription errors may be present. Senior Resident Addendum:  I have seen and examined the patient with the intern. I have discussed the case, including pertinent history and exam findings with the intern.  I agree with the assessment, plan and orders as documented above with the following additions:    Assessment:  Hypotension 2/2 missed medications, rule out sepsis  Acute versus subacute lacunar stroke of right jerardo  History of ESRD on HD  History of hypotension, on midodrine at home  History of T2DM  Jehova[employer] Witness, refusing blood products    Plan:  Admit to MICU  Maintain MAP greater than 65; will insert arterial line and initiate vasopressors as needed  Continue midodrine 10 mg 3 times daily  Follow pancultures  Nephrology consulted for HD  Neurology following: Continue DAPT and statin  Follow MRI brain    Disposition:admit to MICU    Neo Concepcion MD, [facility] PGY-3  8/2/2022  10:05 PM

## 2022-08-03 NOTE — PROGRESS NOTES
Comprehensive Nutrition Assessment    Type and Reason for Visit:  Initial, Wound, Consult    Nutrition Recommendations/Plan:     Continue NPO, Start Tube Feeding d/t poor mentation & previous failed swallow eval    Rec Renal (Nepro 1.8) @ 35 ml/hr + 1 protein modular BID. Will provide: 840 ml tv, 1512 kcals, 68 gm pro (1712 kcals & 120 gm pro w/ mod), 610 ml free water  Regimen meets 100% est calorie & protein needs       Malnutrition Assessment:  Malnutrition Status:  Severe malnutrition (08/03/22 1349)    Context:  Chronic Illness     Findings of the 6 clinical characteristics of malnutrition:  Energy Intake:  75% or less estimated energy requirements for 1 month or longer  Weight Loss:  Unable to assess (d/t fluid shifts w/ ESRD & CHF)     Body Fat Loss:   (Moderate) Orbital   Muscle Mass Loss:  Severe muscle mass loss Temples (temporalis), Thigh (quadraceps), Calf (gastrocnemius), Clavicles (pectoralis & deltoids)  Fluid Accumulation:  No significant fluid accumulation    Strength:  Not Performed    Nutrition Assessment:    Pt admit w/ AMS 2/2 stroke now s/p RRT transfer to ICU for hypotension/ concern for sepsis. PMHx DM, CAD/CHF, ESRD on HD. Noted chest wound s/p infected mediport removal. Pt meets criteria for Severe Malnutrition. Pt NPO x 2 days w/ previous failed swallow eval 8/2. NGT placed for EN support, will provide TF recs & monitor. Nutrition Related Findings:    Pt disoriented, hypotension, +I/O's, +2/+3 edema, active BS, dysphagia, NGT Wound Type:  (wound to chest)       Current Nutrition Intake & Therapies:    Average Meal Intake: NPO     Diet NPO    Anthropometric Measures:  Height: 5' 10\" (177.8 cm)  Ideal Body Weight (IBW): 150 lbs (68 kg)    Current Body Weight: 168 lb 14 oz (76.6 kg) (recent measured wt 7/20 from dialysis visit as CBW elevated)  112.6 % IBW. Current BMI (kg/m2): 24.2  Usual Body Weight:  (variable EMR measured wts 143-178lb x 3 mon back.  Likely fluid shifts w/ ESRD & CHF)                       BMI Categories: Normal Weight (BMI 22.0 to 24.9) age over 72    Estimated Daily Nutrient Needs:  Energy Requirements Based On: Formula  Weight Used for Energy Requirements: Other (Comment) (Dry wt 168lb)  Energy (kcal/day): MSJ 1384 x 1.3 SF= 4659-2900  Weight Used for Protein Requirements: Other (Comment) (dry wt 168lb)  Protein (g/day): 1.5-1.8 g/kg dry wt; 115-140  Fluid (ml/day): per renal/ critical care    Nutrition Diagnosis: In context of chronic illness, Severe malnutrition related to catabolic illness (ESRD on HD) as evidenced by poor intake prior to admission, severe muscle loss, moderate loss of subcutaneous fat    Nutrition Interventions:     Nutrition Education/Counseling: Education not appropriate  Coordination of Nutrition Care: Continue to monitor while inpatient       Goals:     Goals: Initiate nutrition support, Tolerate nutrition support at goal rate       Nutrition Monitoring and Evaluation:      Food/Nutrient Intake Outcomes: Enteral Nutrition Intake/Tolerance  Physical Signs/Symptoms Outcomes: Biochemical Data, Nutrition Focused Physical Findings, Skin, Weight, GI Status, Fluid Status or Edema, Hemodynamic Status    Discharge Planning:     Too soon to determine     Sudha Isaacs RD, LD  Contact: Ext [phone]

## 2022-08-03 NOTE — PROGRESS NOTES
Assessed patient at 0200 complete with blood sugar Blood sugar was 62 and BP was MAP of 55. Patient Aox4. Notified resident Demetrio Santiago of lack of hypoglyemia PRN orders, recent blood sugar and BP. See MAR for new orders.

## 2022-08-03 NOTE — PROGRESS NOTES
Speech Language Pathology      NAME:  Danita Verdin  :  1955  DATE: 8/3/2022  ROOM:  [facility]    Attempted ongoing Speech-Language Pathology intervention --MBSS x2 today. Pt unavailable at this time due to:  [] HOLD per RN/ medical staff d/t medical status   [] Off unit for testing/ procedure    [] With medical staff   [] Declined intervention  [] Sleeping/ Lethargic   [x] Other: Pt waiting for procedure    SLP to continue previously established POC and re-attempt as able.     Stroke, lacunar ([address] [I63.81]  Acute CVA (cerebrovascular accident) ([address] [I63.9]

## 2022-08-03 NOTE — PROGRESS NOTES
[address] [facility]  Department of Internal Medicine   Internal Medicine Residency   MICU Progress Note    Patient:  Bairon Motley 78 y.o. female  MRN: [id number]     Date of Service: 8/3/2022    Allergy: Furosemide    Subjective     Patient alert on examination this morning responding with nods and shaking of head. Following commands with bilateral upper and lower extremities, as well as face. When asked if she was in pain she shook her head no.    24h change: Patient was admitted to the ICU overnight as she had repeated rapid response team was called for hypotension on the floor. ROS: Denies Fever/chills/CP/SOB  Objective     VS: BP (!) 101/48   Pulse 67   Temp (!) 96.1 °F (35.6 °C) (Temporal)   Resp 20   SpO2 94%           I & O - 24hr:   Intake/Output Summary (Last 24 hours) at 8/3/2022 8128  Last data filed at 8/3/2022 4250  Gross per 24 hour   Intake 1690.29 ml   Output 0 ml   Net 1690.29 ml       Physical Exam:  General Appearance: alert and cooperative  Neck: supple, symmetrical, trachea midline  Lung: clear to auscultation bilaterally  Heart:  bradycardic and no murmur present  Abdomen: soft, non-tender; bowel sounds normal; no masses,  no organomegaly  Extremities:  edema 1+  Musculoskeletal: No joint swelling, no muscle tenderness.  ROM limited due to weakness/delerium  Neurologic: Mental status: alertness: lethargic    Lines     site day    Art line   None    TLC L Fem    PICC None    Hemoaccess Fistula/Graft    Oxygen:    Room air  ABG:     Lab Results   Component Value Date/Time    PH 7.390 06/15/2022 09:39 PM    PCO2 46.6 06/15/2022 09:39 PM    PO2 104.6 06/15/2022 09:39 PM    HCO3 21.5 07/16/2022 10:43 AM    BE -5.0 07/16/2022 10:43 AM    THB 11.0 06/15/2022 09:39 PM    O2SAT 92.8 07/16/2022 10:43 AM        Medications     Infusions: (Fluid, Sedation, Vasopressors)  IVF:   500 cc bolus normal saline      Nutrition:   NG tube in place, but patient currently NPO  ATB:   Antibiotics  Days Vancomycin 8/3/2022   Zosyn 8/3/2022         Skin issues: Open wound on right chest, wound on coccyx  Patient currently has     DVT prophylaxis/ GI prophylaxis,      Labs   CBC:   Lab Results   Component Value Date/Time    WBC 2.8 08/03/2022 04:02 AM    RBC 2.89 08/03/2022 04:02 AM    HGB 8.1 08/03/2022 04:02 AM    HCT 25.2 08/03/2022 04:02 AM    MCV 87.2 08/03/2022 04:02 AM    MCH 28.0 08/03/2022 04:02 AM    MCHC 32.1 08/03/2022 04:02 AM    RDW 18.0 08/03/2022 04:02 AM    PLT 91 08/03/2022 04:02 AM    MPV 12.1 08/03/2022 04:02 AM     BMP:    Lab Results   Component Value Date/Time     08/03/2022 04:02 AM    K 3.8 08/03/2022 04:02 AM    K 3.1 08/02/2022 06:25 AM     08/03/2022 04:02 AM    CO2 23 08/03/2022 04:02 AM    BUN 12 08/03/2022 04:02 AM    LABALBU 2.8 08/02/2022 01:15 AM    LABALBU 3.8 04/02/2012 11:00 AM    CREATININE 2.4 08/03/2022 04:02 AM    CALCIUM 8.5 08/03/2022 04:02 AM    GFRAA 24 08/03/2022 04:02 AM    LABGLOM 24 08/03/2022 04:02 AM    GLUCOSE 85 08/03/2022 04:02 AM    GLUCOSE 46 04/02/2012 11:00 AM       Imaging Studies:  CXR: Small patchy opacity within the left lung base which could represent atelectasis or pneumonia. Atelectasis is favored. The right lung is clear  CT scan: Small hypodensity right jerardo not demonstrated previously. Acute or subacute lacunar infarct at this location cannot be excluded. This finding could be more fully evaluated with MRI.     Resident's Assessment and Plan     Neuro  Acute vs subacute lacunar stroke  Patient presented to the ED with altered mental status after hemodialysis  CT scan revealed small hypodensity in the right jerardo suggestive of possible acute or subacute lacunar stroke  MRI of the brain ordered but not yet complete  Continue DAPT, statin  Neurology is following  Cardio  Hypotension 2/2 missed medications vs hypovolemia, r/o sepsis  Patient missed midodrine dose prior to RRT for hypotension and 2/2 NPO status  Patient with chronic hypotension, on 10 mg midodrine 3 times daily at home  Procal 0.27  Lactic acid 0.6> 0.7  Blood cultures currently 24 hours no growth  Urine cultures pending  Obtain wound cultures from right chest  Start vancomycin and Zosyn  Start albumin 5%  Continue midodrine  Hold Bumex  MAP goal greater than 65  Insert arterial line, initiate vasopressors as needed  Consult ID  CAD s/p stent placement 5/2022  Patient currently without chest pain  Continue DAPT with aspirin and ticagrelor  Hx of HLD  Continue Lipitor 80 mg  Hx of HErEF, EF 40-45%, last echo 4/2022  On Bumex at home  Hold Bumex in the setting of hypotension  Renal  ESRD on HD  Patient had hemodialysis yesterday, scheduled M/W/F  Creatinine 2.4 today  Follow daily BMP  No plans for dialysis today in the setting of hypotension  Nephrology following  Hx of hyperuricemia  Continue allopurinol 100 mg daily  Endocrine   Hx of T2 IDDM  On 5 units Lantus nightly  Currently with hypoglycemic episodes 2/2 NPO status  Hold home Lantus  Follow BGs  Infection  Concern for sepsis  Follow blood cultures  Follow urine cultures  Obtain wound cultures  Start vancomycin and Zosyn  Consult ID  Heme/Onc  Pancytopenia 2/2 possibly infection  WBC of 2.8, hemoglobin 8.1, platelet count 91  Follow blood cultures  Follow wound cultures  Follow CBC  Anemia 2/2 possibly chronic  Hemoglobin 8.1 today, possible baseline around 9  Low iron, low TIBC on previous iron work-up  Skin  Wound of the right chest s/p Mediport removal  Mediport removed 2/2 hardware infection in 6/2022  Appears purulent on exam  Wound culture  Wound care following  Sacral wound  Wound care following    DVT ppx: Heparin  GI ppx: -  Code status: Full, but patient is [employer] so no blood products    Disposition: Continue current management    Annia Mckeon DO, PGY-1    Attending physician: Dr. Joel Mason

## 2022-08-03 NOTE — PROGRESS NOTES
during entire RRT as per notes  Transferred to ICU setting yesterday evening after antibiotic response team was called for hypotension  Pancultures  Empiric IV antibiotic therapy  Right-sided port in place with urinary source of sepsis     Acute/subacute lacunar infarct right jerardo  -MRI brain without contrast pending  -Aspirin 81 mg Lipitor 40 mg daily  -Consult neurology -   -Check lipid panel and hemoglobin A1c needs tight control of risk factors. -Monitor blood pressure-adjust medications as needed.  -Discussed risk factor modification.  -Swallow study - failed.   ?Peg placement-repeat swallow eval in a day or 2     ESRD  -Dialysis MWF  -Consult nephrology  -Monitor labs  -Discontinue IV fluids as patient is already markedly volume overloaded     Mild elevation in ALT AST  Continue to monitor daily  No evidence of Daly sign right upper quadrant     DVT Prophylaxis   PT/OT  Discharge planning       Case discussed with resident            Aden Galeano MD  7:32 PM  8/3/2022

## 2022-08-03 NOTE — CONSULTS
Nephrology Consult Note  Patient's Name: Oswald Saleem  9:40 AM  8/3/2022        Reason for Consult:  ESRD  Requesting Physician:  Leena Bateman DO    Chief Complaint:  hypotension, altered mental status  History Obtained From:  EHR    History of Present Ilness:    Oswald Saleem is a 78 y.o. female with a history of ESRD HD dependent, vertebral osteomyelitis ( s/p antibiotic treatment ) on suppressive antibiotic therapy with doxycycline, CAD, right breast cancer( s/p chemotx ; s/p mastectomy ) ) and several other medical problems listed below. Patient was observed to be slow to response following her hemodialysis treatment at the facility. She was seen in the ED for evaluation. No history of a fall. Initial evaluation in the ED revealed patient have a blood pressure 134/87, pulse of 56 and a temperature of 97.9. She was noted to have intermittent hypotensive episodes. Laboratory data showed WBC 2.5, hemoglobin 8.1, platelet count 07,081. CHEM profile was consistent with her ESRD status. Patient was admitted to telemetry for continuation of care. Patient developed marked hypotension last evening. RRT was called. Initial blood pressure noted to be 80/40. She was given 250 cc normal saline bolus. Subsequently transferred to MICU for further management. Past Medical History:   Diagnosis Date    Acute infection of bone ([address]     infection of rt foot, resolved.     Acute osteomyelitis of phalanx of left hand ([address] 1/27/2022    Left third distal phalanx    Anemia of chronic disease     Arthritis     Breast cancer ([address]     right breast, 2008/ bladder, 2006- last chemotherapy \"years ago\"    CAD (coronary artery disease)     Carpal tunnel syndrome     bilat - for OR left hand 3-17-20     Chronic diastolic CHF (congestive heart failure) ([address] 09/23/2014 9/23/14- echocardiogram revealed moderate LV concentric hypertrophy, stage III diastolic dysfunction, mild tricuspid regurgitation    CKD (chronic kidney disease) stage 4, GFR 15-29 ml/min (HCC)     Diabetic retinopathy (HCC)     Glaucoma     Hemodialysis patient ([address]     [facility]- Dr. Mandy Lopes - left arm fistula     Hyperkalemia, diminished renal excretion 11/9/2017    Hyperlipidemia     Hypertension     Hypoglycemia unawareness in type 1 diabetes mellitus ([address] 11/7/2017    Insulin dependent type 2 diabetes mellitus ([address]     Neuropathy     feet    Osteomyelitis due to secondary diabetes ([address]     rt great toe with amputation    Patient is Judaism 11/7/2017    Refusal of blood product     patient states she dose not take blood transfusion    Ventricular hypertrophy     Ventricular tachycardia ([address] 5/24/2021    Vitreous hemorrhage ([address]     left eye       Past Surgical History:   Procedure Laterality Date    AMPUTATION      right great toe    ANKLE SURGERY      correction on charcot joint of right ankle    BONE BIOPSY N/A 04/18/2022    BONE BIOPSY PERCUTANEOUS L1/L2 performed by Chiara Hester MD at [facility] [address] [facility]  12/09/2021    Dr Nina Al Left 03/17/2020    LEFT CARPAL TUNNEL RELEASE performed by Abril Miramontes MD at [address]      bilateral    CHOLECYSTECTOMY      COLONOSCOPY      CORONARY ANGIOPLASTY  05/05/2022    Dr. Patel Castro - RCA angioplasty    [address] Left 01/31/2018    upper arm/Dr. Bay Becker    ECHO COMPL W DOP COLOR FLOW  02/14/2013         ECHO COMPLETE  09/17/2013         FINGER AMPUTATION Left 01/20/2022    AMPUTATION OF LEFT THIRD DIGIT, DISTAL PHALANX performed by Linda Brantley MD at [facility]      right    OTHER SURGICAL HISTORY  09/27/2011    PPV, membranectomy, laser Right eye    OTHER SURGICAL HISTORY  insertion lumbar drain insertion    10/12/`14    OTHER SURGICAL HISTORY  10/22/2015    percutaneous lead placement for spinal cord stimulator    OTHER SURGICAL HISTORY  11/03/2015    Spinal; cord stimulator- turned off as of 3-10-20     PORT SURGERY N/A 6/27/2022    PORT REMOVAL performed by Jade Perez MD at [facility] AV ANAST,UP ARM BASILIC VEIN TRANSPOSIT Left 05/15/2018    TRANSPOSITION STAGE II AV FISTULA - LEFT UPPER ARM performed by Dorene Mobley MD at [facility],[address] ANGIOACCESS AV FISTULA Left 09/25/2018    SUPERFICIALIZATION AV FISTULA - LEFT ARM performed by Dorene Mobley MD at [facility] RPR RETINAL [place] W/VITRECTOMY ANY METH Left 04/10/2018    PARS PLANA VITRECTOMY 25 GAUGE RETINAL DETACHMENT REPAIR air fluid exchange, endolaser performed by Kailee Morel MD at [address]      L2    TRANSESOPHAGEAL ECHOCARDIOGRAM  11/11/2021    Dr. Nola Farrar    TRANSESOPHAGEAL ECHOCARDIOGRAM  04/19/2022        TUNNELED VENOUS CATHETER PLACEMENT  11/15/2017    VITRECTOMY Left 04/10/2018    PARS PLANA VITRECTOMY; RETINAL DETACHMENT REPAIR; GAS BUBBLE; LASER LEFT EYE       Family History   Problem Relation Age of Onset    Breast Cancer Mother 61    Hypertension Mother     Heart Disease Father     Prostate Cancer Father     Breast Cancer Maternal Grandmother 60        reports that she has never smoked. She has never used smokeless tobacco. She reports that she does not drink alcohol and does not use drugs.     Allergies:  Furosemide    Current Medications:    glucose chewable tablet 16 g, PRN  dextrose bolus 10% 125 mL, PRN   Or  dextrose bolus 10% 250 mL, PRN  glucagon (rDNA) injection 1 mg, PRN  dextrose 10 % infusion, Continuous PRN  midodrine (PROAMATINE) tablet 10 mg, TID WC  norepinephrine (LEVOPHED) 16 mg in dextrose 5% 250 mL infusion, Continuous  0.9 % sodium chloride bolus, Once  piperacillin-tazobactam (ZOSYN) 4,500 mg in dextrose 5 % 100 mL IVPB ([id number]), Once   Followed by  piperacillin-tazobactam (ZOSYN) 4,500 mg in dextrose 5 % 100 mL IVPB ([id number]), Q12H  white petrolatum ointment, TID PRN  0.9 % sodium chloride bolus, Once  sodium chloride flush bruit  Neuro: lethargic, opens eyes to name, follows some simple commands      Data:   Labs:  Lab Results   Component Value Date     08/03/2022     08/02/2022     08/02/2022    K 3.8 08/03/2022    K 3.3 (L) 08/02/2022    K 3.1 (L) 08/02/2022     08/03/2022    CO2 23 08/03/2022    CO2 26 08/02/2022    CO2 26 08/02/2022    CREATININE 2.4 (H) 08/03/2022    CREATININE 2.2 (H) 08/02/2022    CREATININE 1.9 (H) 08/02/2022    BUN 12 08/03/2022    BUN 11 08/02/2022    BUN 9 08/02/2022    GLUCOSE 85 08/03/2022    GLUCOSE 131 (H) 08/02/2022    GLUCOSE 70 (L) 08/02/2022    PHOS 3.1 08/03/2022    PHOS 3.0 08/02/2022    PHOS 2.6 08/02/2022    WBC 2.8 (L) 08/03/2022    WBC 2.7 (L) 08/02/2022    WBC 3.0 (L) 08/02/2022    HGB 8.1 (L) 08/03/2022    HGB 7.6 (L) 08/02/2022    HGB 7.8 (L) 08/02/2022    HCT 25.2 (L) 08/03/2022    HCT 24.4 (L) 08/02/2022    HCT 25.3 (L) 08/02/2022    MCV 87.2 08/03/2022    PLT 91 (L) 08/03/2022         Imaging:  CT Head WO Contrast    Result Date: 8/1/2022  EXAMINATION: CT OF THE HEAD WITHOUT CONTRAST  8/1/2022 1:09 pm TECHNIQUE: CT of the head was performed without the administration of intravenous contrast. Automated exposure control, iterative reconstruction, and/or weight based adjustment of the mA/kV was utilized to reduce the radiation dose to as low as reasonably achievable. COMPARISON: 07/31/2022 noncontrast head CT. HISTORY: ORDERING SYSTEM PROVIDED HISTORY: altered mental status TECHNOLOGIST PROVIDED HISTORY: Reason for exam:->altered mental status Has a \"code stroke\" or \"stroke alert\" been called? ->No Decision Support Exception - unselect if not a suspected or confirmed emergency medical condition->Emergency Medical Condition (MA) What reading provider will be dictating this exam?->CRC FINDINGS: BRAIN/VENTRICLES: The ventricles are normal size, position and contour. Cortical sulci and sylvian fissures are mildly prominent. There are no masses or mass effect.   There are no parenchymal hemorrhages or extra-axial fluid collections. There is a small hypodense area in the right jerardo not demonstrated previously. The cerebellum is unremarkable. ORBITS: The visualized portion of the orbits demonstrate no acute abnormality. SINUSES: The visualized paranasal sinuses and mastoid air cells demonstrate no acute abnormality. SOFT TISSUES/SKULL:  No definite fractures. There is a small left periorbital soft tissue contusion and hematoma, stable to slightly improved. Small hypodensity right jerardo not demonstrated previously. Acute or subacute lacunar infarct at this location cannot be excluded. This finding could be more fully evaluated with MRI. XR CHEST PORTABLE    Result Date: 8/1/2022  EXAMINATION: ONE XRAY VIEW OF THE CHEST 8/1/2022 12:56 pm COMPARISON: Previous chest x-ray of 07/11/2022 and CT scan of 06/15/2022 HISTORY: ORDERING SYSTEM PROVIDED HISTORY: altered TECHNOLOGIST PROVIDED HISTORY: Reason for exam:->altered What reading provider will be dictating this exam?->CRC FINDINGS: The cardiac silhouette is within normal limits. The right lung is clear. There is chronic elevation right hemidiaphragm There is a very small patchy opacity seen within the left lung base which could represent atelectasis or less likely pneumonia. The left upper lobe is clear. 1. Small patchy opacity within the left lung base which could represent atelectasis or pneumonia. Atelectasis is favored. 2. The right lung is clear. XR CHEST ABDOMEN NG PLACEMENT    Result Date: 8/2/2022  EXAMINATION: ONE SUPINE XRAY VIEW(S) OF THE ABDOMEN 8/2/2022 7:00 pm COMPARISON: None.  HISTORY: ORDERING SYSTEM PROVIDED HISTORY: hyoptension TECHNOLOGIST PROVIDED HISTORY: Reason for exam:->hyoptension What reading provider will be dictating this exam?->CRC FINDINGS: Single frontal view of the lower chest and upper abdomen demonstrate and NG tube in position with the tip approximately 12 cm below the GE junction in the body of the stomach. There are multiple EKG leads overlying the upper abdomen and there is evidence of previous laminectomy pedicle screw fixation of the lower lumbar spine. Satisfactory placement of NG tube as described. Assessment    Shock, ?septic vs hypovolemic  Altered mental status due to Acute lacunar stroke  R upper chest wall wound  Pancytopenia , ? Malignancy related  Hypothermia, s?  Sepsis induced  ESRD, HD dependent MWF    Plan    Panculture  Antibiotics  No pans for HD today  Neurology consult  ID consult  For MRI of brain when able    D/W Resident    Isabela Donohue MD  9:40 AM  8/3/2022

## 2022-08-03 NOTE — PLAN OF CARE
Problem: Chronic Conditions and Co-morbidities  Goal: Patient's chronic conditions and co-morbidity symptoms are monitored and maintained or improved  Outcome: Progressing     Problem: Discharge Planning  Goal: Discharge to home or other facility with appropriate resources  Outcome: Progressing     Problem: Safety - Adult  Goal: Free from fall injury  Outcome: Progressing  Flowsheets (Taken 8/3/2022 0800)  Free From Fall Injury: Instruct family/caregiver on patient safety     Problem: ABCDS Injury Assessment  Goal: Absence of physical injury  Outcome: Progressing  Flowsheets  Taken 8/3/2022 1600  Absence of Physical Injury: Implement safety measures based on patient assessment  Taken 8/3/2022 0800  Absence of Physical Injury: Implement safety measures based on patient assessment     Problem: Skin/Tissue Integrity  Goal: Absence of new skin breakdown  Description: 1. Monitor for areas of redness and/or skin breakdown  2. Assess vascular access sites hourly  3. Every 4-6 hours minimum:  Change oxygen saturation probe site  4. Every 4-6 hours:  If on nasal continuous positive airway pressure, respiratory therapy assess nares and determine need for appliance change or resting period.   Outcome: Progressing

## 2022-08-03 NOTE — PLAN OF CARE
Problem: Chronic Conditions and Co-morbidities  Goal: Patient's chronic conditions and co-morbidity symptoms are monitored and maintained or improved  Outcome: Progressing     Problem: Discharge Planning  Goal: Discharge to home or other facility with appropriate resources  Outcome: Progressing     Problem: Pain  Goal: Verbalizes/displays adequate comfort level or baseline comfort level  Outcome: Progressing     Problem: Safety - Adult  Goal: Free from fall injury  Outcome: Progressing     Problem: ABCDS Injury Assessment  Goal: Absence of physical injury  Outcome: Progressing     Problem: Skin/Tissue Integrity  Goal: Absence of new skin breakdown  Description: 1. Monitor for areas of redness and/or skin breakdown  2. Assess vascular access sites hourly  3. Every 4-6 hours minimum:  Change oxygen saturation probe site  4. Every 4-6 hours:  If on nasal continuous positive airway pressure, respiratory therapy assess nares and determine need for appliance change or resting period.   Outcome: Progressing     Problem: Cardiovascular - Adult  Goal: Maintains optimal cardiac output and hemodynamic stability  Outcome: Progressing     Problem: Metabolic/Fluid and Electrolytes - Adult  Goal: Hemodynamic stability and optimal renal function maintained  Outcome: Progressing

## 2022-08-03 NOTE — CONSULTS
[address] [place] [address] [facility]  [facility]    Consultation Note     Admit Date: 8/1/2022 11:42 AM    Reason for Consult: Ongoing treatment for vertebral osteomyelitis and suppressive therapy    Attending Physician:  Marigene Kehr, MD     Chief Complaint: Change in mental status    HISTORY OF PRESENT ILLNESS:   The patient is a 78 y.o. woman known to the Infectious Diseases service. The patient is admitted because of change in mental status. She had resided at the [facility]. She is on suppressive therapy with doxycycline for vertebral osteomyelitis and just completed 8 weeks of antibiotics for vertebral osteo and now on suppression. Postdialysis apparently the patient was slow to respond and subsequently transferred to the emergency room. She is a Monday Wednesday Friday dialysis patient. Labs showed that her BUN and creatinine 38 and 1.8 and her white count was 2.5 with a hemoglobin 8.1 CT of the head showed a new area in the right jerardo and there is concern for lytic coronary infarct exist.  Patient may get a MRI of the brain. Because of high risk for aspiration with barium swallow was ordered and CT of the chest as well as CT abdomen pelvis. NG tube was placed. Family at bedside. On 8/2/2022 rapid response team was called because the patient became hypotensive she has had dialysis that day and midodrine was held but she required a 500 cc IV fluid bolus. She had poor access and central line has been placed as well as an NG tube because of concerns for aspiration as well as been started on Zosyn    June 2022 evaluated for aspiration pneumonia and suppressive therapy for vertebral osteomyelitis having completed 6 weeks of vancomycin with hemodialysis.   After the aspiration pneumonia was treated she was continued on vancomycin Monday Wednesdays and Fridays with a planned suppression with doxycycline     April 2022: Seen at [facility] by Dr. Rangel Fernandez for lumbar spine osteomyelitis with a 4/18/22  bone biopsy coag negative staph. Patient scheduled to be on vancomycin 753 times a week post hemodialysis to end of June 1, 2022. Coag negative staph grew and anaerobic cultures 1 colony that was sensitive to doxycycline. Past Medical History:        Diagnosis Date    Acute infection of bone ([address]     infection of rt foot, resolved.     Acute osteomyelitis of phalanx of left hand ([address] 1/27/2022    Left third distal phalanx    Anemia of chronic disease     Arthritis     Breast cancer ([address]     right breast, 2008/ bladder, 2006- last chemotherapy \"years ago\"    CAD (coronary artery disease)     Carpal tunnel syndrome     bilat - for OR left hand 3-17-20     Chronic diastolic CHF (congestive heart failure) ([address] 09/23/2014 9/23/14- echocardiogram revealed moderate LV concentric hypertrophy, stage III diastolic dysfunction, mild tricuspid regurgitation    CKD (chronic kidney disease) stage 4, GFR 15-29 ml/min ([facility])     Diabetic retinopathy ([address]     Glaucoma     Hemodialysis patient ([address]     [facility]- Dr. Mandy Lopes - left arm fistula     Hyperkalemia, diminished renal excretion 11/9/2017    Hyperlipidemia     Hypertension     Hypoglycemia unawareness in type 1 diabetes mellitus ([address] 11/7/2017    Insulin dependent type 2 diabetes mellitus ([address]     Neuropathy     feet    Osteomyelitis due to secondary diabetes ([address]     rt great toe with amputation    Patient is [facility] 11/7/2017    Refusal of blood product     patient states she dose not take blood transfusion    Ventricular hypertrophy     Ventricular tachycardia ([address] 5/24/2021    Vitreous hemorrhage ([address]     left eye     Past Surgical History:        Procedure Laterality Date    AMPUTATION      right great toe    ANKLE SURGERY      correction on charcot joint of right ankle    BONE BIOPSY N/A 04/18/2022    BONE BIOPSY PERCUTANEOUS L1/L2 performed by Chiara Hester MD at [place] [address] CATHETERIZATION  12/09/2021    Dr Татьяна Nelson Left 03/17/2020    LEFT CARPAL TUNNEL RELEASE performed by Hernán Burnette MD at [address]      bilateral    CHOLECYSTECTOMY      COLONOSCOPY      CORONARY ANGIOPLASTY  05/05/2022    Dr. Carlie Agosto - RCA angioplasty    [address] Left 01/31/2018    upper arm/Dr. Dai Thompson    ECHO COMPL W DOP COLOR FLOW  02/14/2013         ECHO COMPLETE  09/17/2013         FINGER AMPUTATION Left 01/20/2022    AMPUTATION OF LEFT THIRD DIGIT, DISTAL PHALANX performed by Phuc Barragan MD at [facility]      right    OTHER SURGICAL HISTORY  09/27/2011    PPV, membranectomy, laser Right eye    OTHER SURGICAL HISTORY  insertion lumbar drain insertion    10/12/`14    OTHER SURGICAL HISTORY  10/22/2015    percutaneous lead placement for spinal cord stimulator    OTHER SURGICAL HISTORY  11/03/2015    Spinal; cord stimulator- turned off as of 3-10-20     PORT SURGERY N/A 6/27/2022    PORT REMOVAL performed by Anne Jarquin MD at [facility] AV ANAST,UP ARM BASILIC VEIN TRANSPOSIT Left 05/15/2018    TRANSPOSITION STAGE II AV FISTULA - LEFT UPPER ARM performed by Rose Montenegro MD at [address] [place] [address],[address] ANGIOACCESS AV FISTULA Left 09/25/2018    SUPERFICIALIZATION AV FISTULA - LEFT ARM performed by Rose Montenegro MD at [address] [facility] [place] [facility] W/VITRECTOMY ANY METH Left 04/10/2018    PARS PLANA VITRECTOMY 25 GAUGE RETINAL DETACHMENT REPAIR air fluid exchange, endolaser performed by Alejandra Hensley MD at [facility]      L2    TRANSESOPHAGEAL ECHOCARDIOGRAM  11/11/2021    Dr. Leon Kayser    TRANSESOPHAGEAL ECHOCARDIOGRAM  04/19/2022        TUNNELED VENOUS CATHETER PLACEMENT  11/15/2017    VITRECTOMY Left 04/10/2018    PARS PLANA VITRECTOMY; RETINAL DETACHMENT REPAIR; GAS BUBBLE; LASER LEFT EYE     Current Medications:   Scheduled Meds:   midodrine  10 mg Oral TID WC    sodium CONSTITUTIONAL:  No chills, fevers or night sweats. No loss of weight. EYES:  No double vision or drainage from eyes, ears or throat. HEENT:  No neck stiffness. No dysphagia. No drainage from eyes, ears or throat  RESPIRATORY:  No cough, productive sputum or hemoptysis. CARDIOVASCULAR:  No chest pain, palpitations, orthopnea or dyspnea on exertion. GASTROINTESTINAL:  No nausea, vomiting, diarrhea or constipation or hematochezia   GENITOURINARY:  No frequency burning dysuria or hematuria. INTEGUMENT/BREAST:  No rash or breast masses. HEMATOLOGIC/LYMPHATIC:  No lymphadenopathy or blood dyscrasics. ALLERGIC/IMMUNOLOGIC:  No anaphylaxis. ENDOCRINE:  No polyuria or polydipsia or temperature intolerance. MUSCULOSKELETAL:  No myalgia or arthralgia. Full ROM. NEUROLOGICAL:  No focal motor sensory deficit. BEHAVIOR/PSYCH:  No psychosis. PHYSICAL EXAM:    Vitals:    /72   Pulse 71   Temp 97.4 °F (36.3 °C) (Oral)   Resp 18   SpO2 99%   Constitutional: The patient is awake, currently decreased in sensorium  Skin: Warm and dry. No rashes were noted. No jaundice. HEENT: Eyes show round, and reactive pupils. Moist mucous membranes, no ulcerations, no thrush. Neck: Supple to movements. No lymphadenopathy. Chest: No use of accessory muscles to breathe. Symmetrical expansion. Auscultation reveals no wheezing, crackles, or rhonchi. Cardiovascular: S1 and S2 are rhythmic and regular. No murmurs appreciated. Abdomen: Positive bowel sounds to auscultation. Benign to palpation. No masses felt. No hepatosplenomegaly. Genitourinary: Female  Extremities: No clubbing, no cyanosis, upper and lower extremity 3+ edema. Musculoskeletal: Equal and symmetrical  Neurological: Decreased sensorium tracks with her eyes but minimal response to verbal stimulation.   Paralyzed on the right and previously infiltrated IV site  Lines: peripheral  8/1/2022 left femoral triple-lumen catheter  Left arm hemodialysis access        CBC+dif:  Recent Labs     08/02/22  0625 08/02/22  1848 08/03/22  0402   WBC 3.0* 2.7* 2.8*   HGB 7.8* 7.6* 8.1*   HCT 25.3* 24.4* 25.2*   MCV 88.2 87.1 87.2   PLT 72* 81* 91*   NEUTROABS 2.20 2.59 2.66     Lab Results   Component Value Date    CRP 1.4 (H) 07/11/2022    CRP 7.7 (H) 04/13/2022    CRP 0.7 (H) 01/20/2022     Lab Results   Component Value Date    CRPHS 0.7 08/16/2016    CRPHS 2.6 04/05/2016    CRPHS 7.6 (H) 01/28/2015     Lab Results   Component Value Date    SEDRATE 30 (H) 06/15/2022    SEDRATE 81 (H) 04/13/2022    SEDRATE 19 01/20/2022     Lab Results   Component Value Date    ALT 62 (H) 08/02/2022    AST 65 (H) 08/02/2022    ALKPHOS 161 (H) 08/02/2022    BILITOT 0.3 08/02/2022     Lab Results   Component Value Date/Time     08/03/2022 04:02 AM    K 3.8 08/03/2022 04:02 AM    K 3.1 08/02/2022 06:25 AM     08/03/2022 04:02 AM    CO2 23 08/03/2022 04:02 AM    BUN 12 08/03/2022 04:02 AM    CREATININE 2.4 08/03/2022 04:02 AM    GFRAA 24 08/03/2022 04:02 AM    LABGLOM 24 08/03/2022 04:02 AM    GLUCOSE 85 08/03/2022 04:02 AM    GLUCOSE 46 04/02/2012 11:00 AM    PROT 5.1 08/02/2022 01:15 AM    LABALBU 2.8 08/02/2022 01:15 AM    LABALBU 3.8 04/02/2012 11:00 AM    CALCIUM 8.5 08/03/2022 04:02 AM    BILITOT 0.3 08/02/2022 01:15 AM    ALKPHOS 161 08/02/2022 01:15 AM    AST 65 08/02/2022 01:15 AM    ALT 62 08/02/2022 01:15 AM       Lab Results   Component Value Date/Time    PROTIME 11.0 08/01/2022 12:43 PM    PROTIME 10.4 02/15/2012 10:40 AM    INR 1.0 08/01/2022 12:43 PM       Lab Results   Component Value Date/Time    TSH 4.170 07/11/2022 10:48 AM       Lab Results   Component Value Date/Time    COLORU Yellow 11/07/2017 10:10 PM    PHUR 5.5 11/07/2017 10:10 PM    LABCAST RARE 02/17/2017 02:20 PM    WBCUA 1-3 11/07/2017 10:10 PM    WBCUA NONE 08/27/2011 04:15 PM    RBCUA 0-1 11/07/2017 10:10 PM    RBCUA 1-3 02/13/2013 08:19 PM    BACTERIA MODERATE 11/07/2017 10:10 PM    CLARITYU Clear 11/07/2017 10:10 PM    SPECGRAV 1.025 11/07/2017 10:10 PM    LEUKOCYTESUR Negative 11/07/2017 10:10 PM    UROBILINOGEN 0.2 11/07/2017 10:10 PM    BILIRUBINUR Negative 11/07/2017 10:10 PM    BILIRUBINUR NEGATIVE 08/27/2011 04:15 PM    BLOODU Negative 11/07/2017 10:10 PM    GLUCOSEU Negative 11/07/2017 10:10 PM    GLUCOSEU NEGATIVE 08/27/2011 04:15 PM    AMORPHOUS MODERATE 11/07/2017 07:30 PM       No results found for: VDN7TNN, BEART, Y8HBUJSV, PHART, THGBART, JGP8KMH, PO2ART, QMK1VQR  Radiology:  XR CHEST ABDOMEN NG PLACEMENT   Final Result   Satisfactory placement of NG tube as described. CT Head WO Contrast   Final Result   Small hypodensity right jerardo not demonstrated previously. Acute or subacute   lacunar infarct at this location cannot be excluded. This finding could be   more fully evaluated with MRI. XR CHEST PORTABLE   Final Result   1. Small patchy opacity within the left lung base which could represent   atelectasis or pneumonia. Atelectasis is favored. 2. The right lung is clear. MRI BRAIN WO CONTRAST    (Results Pending)   FL MODIFIED BARIUM SWALLOW W VIDEO    (Results Pending)   CT CHEST WO CONTRAST    (Results Pending)   CT ABDOMEN PELVIS WO CONTRAST Additional Contrast? None    (Results Pending)       Microbiology:  Pending  Recent Labs     08/01/22  1233   BC 24 Hours no growth     No results for input(s): ORG in the last 72 hours. Recent Labs     08/01/22  1243   BLOODCULT2 24 Hours no growth     No results for input(s): STREPNEUMAGU in the last 72 hours. No results for input(s): LP1UAG in the last 72 hours. No results for input(s): ASO in the last 72 hours. No results for input(s): CULTRESP in the last 72 hours. Assessment:  New acute lacunar infarct  Treated for vertebral osteomyelitis and now on suppressive doxycycline  Rule out aspiration pneumonia  Neutropenia    Plan:    Cont Zosyn pending CT scans of the chest  When possible resume doxycycline p.o.   Check cultures  Baseline ESR, CRP  Monitor labs  Will follow with you    Thank you for having us see this patient in consultation. I will be discussing this case with the treating physicians.       Electronically signed by Joaquin Denton MD on 8/3/2022 at 11:28 AM

## 2022-08-03 NOTE — PROGRESS NOTES
Dr. Bronson Velazquez MD,    Your patient is on a medication that requires a renal dose adjustment. Renal Function Assessment:    Date Body Weight IBW  Adjusted BW SCr  CrCl Dialysis status   8/3/2022 90.7kg   Ideal body weight: 68.5 kg (151 lb 0.2 oz)  Adjusted ideal body weight: 77.4 kg (170 lb 9.7 oz) Serum creatinine: 2.4 mg/dL (H) 08/03/22 0402  Estimated creatinine clearance: 28 mL/min (A) HD       Pharmacy has renally dose-adjusted the following medication(s):    Date Original Order Renally Adjusted Order   8/3/2022 Zosyn 3375mg q8H Zosyn 4500mg q12h       These changes were made per protocol according to the [facility] Renal Function-Based Dose Adjustments Policy    *Please note this dose may need readjusted if your patient's renal function significantly improves. Please contact pharmacy with any questions regarding these changes.     John Whitfield, [address] [place] [address]  8/3/2022  9:28 AM

## 2022-08-03 NOTE — CONSULTS
NEUROLOGY CONSULT NOTE      Requesting Physician:  Sridhar Logan, APRN - CNP    Reason for Consult:  Evaluate for CVA    History of Present Illness:  Ofelia Osman is a 78 y.o. female  with h/o HTN, HLD, DM, CAD, CKD stage IV, osteomyelitis of left third finger, breast and bladder cancer, pancytopenia, and chronic diastolic CHF who was admitted to [facility] on 8/1/2022 with presentation of altered mental status with decreased responsiveness. Patient had been in dialysis time presentation when she was noted to have slowing of her responses compared to her baseline. She was able to complete her dialysis treatment with subsequent referral to ED for further evaluation. There was no report of any other associated symptoms or events. NIHSS on presentation to the ED was 3 with BP of 134/87. CT scan of the head was negative for any acute intracranial abnormalities on 7/31/2022. However study on 8/1/2022 showed a small hypodensity right jerardo suspicious for acute or subacute lacunar infarct. Patient w/o new neurologic deficits. She denied any chest pain, shortness of breath, fever, chills, headache, blurred vision, and abdominal pain. He was admitted for further evaluation. However, in the morning of 8/2/2022 patient had RRT called due to hypotension was treated with fluid bolus and midodrine. Second RRT initiated later in the pm for hypotension again. Patient was treated with midodrine and fluid bolus as before and she was subsequently transferred to the ICU for further management. Past Medical History:        Diagnosis Date    Acute CVA (cerebrovascular accident) ([address] 8/3/2022    Acute infection of bone ([address]     infection of rt foot, resolved.     Acute osteomyelitis of phalanx of left hand ([address] 1/27/2022    Left third distal phalanx    Anemia of chronic disease     Arthritis     Breast cancer ([address]     right breast, 2008/ bladder, 2006- last chemotherapy \"years ago\"    CAD (coronary artery disease)     Carpal tunnel syndrome     bilat - for OR left hand 3-17-20     Chronic diastolic CHF (congestive heart failure) ([address] 2014- echocardiogram revealed moderate LV concentric hypertrophy, stage III diastolic dysfunction, mild tricuspid regurgitation    CKD (chronic kidney disease) stage 4, GFR 15-29 ml/min ([facility])     Diabetic retinopathy ([address]     Glaucoma     Hemodialysis patient ([address]     [facility]- Dr. Dwight Garza - left arm fistula     Hyperkalemia, diminished renal excretion 2017    Hyperlipidemia     Hypertension     Hypoglycemia unawareness in type 1 diabetes mellitus ([address] 2017    Insulin dependent type 2 diabetes mellitus ([address]     Neuropathy     feet    Osteomyelitis due to secondary diabetes ([address]     rt great toe with amputation    Patient is [employer] 2017    Refusal of blood product     patient states she dose not take blood transfusion    Ventricular hypertrophy     Ventricular tachycardia ([address] 2021    Vitreous hemorrhage ([address]     left eye           Procedure Laterality Date    AMPUTATION      right great toe    ANKLE SURGERY      correction on charcot joint of right ankle    BONE BIOPSY N/A 2022    BONE BIOPSY PERCUTANEOUS L1/L2 performed by Adenike Childs MD at [address] [place] [address]  2021    Dr Selma Beltran Left 2020    LEFT CARPAL TUNNEL RELEASE performed by Jules Caal MD at [facility]      bilateral    CHOLECYSTECTOMY      COLONOSCOPY      CORONARY ANGIOPLASTY  2022    Dr. Rl Fields - RCA angioplasty    CYSTOSCOPY      DIALYSIS FISTULA CREATION Left 2018    upper arm/Dr. Martini Michelle    ECHO COMPL W DOP COLOR FLOW  2013         ECHO COMPLETE  2013         FINGER AMPUTATION Left 2022    AMPUTATION OF LEFT THIRD DIGIT, DISTAL PHALANX performed by Reji Gallegos MD at [place] [address]      right    OTHER SURGICAL HISTORY  09/27/2011    PPV, membranectomy, laser Right eye    OTHER SURGICAL HISTORY  insertion lumbar drain insertion    10/12/`14    OTHER SURGICAL HISTORY  10/22/2015    percutaneous lead placement for spinal cord stimulator    OTHER SURGICAL HISTORY  11/03/2015    Spinal; cord stimulator- turned off as of 3-10-20     PORT SURGERY N/A 6/27/2022    PORT REMOVAL performed by Tobias Arndt MD at [facility] AV ANAST,UP ARM BASILIC VEIN TRANSPOSIT Left 05/15/2018    TRANSPOSITION STAGE II AV FISTULA - LEFT UPPER ARM performed by Annabel Castillo MD at [address] [facility],[address] ANGIOACCESS AV FISTULA Left 09/25/2018    SUPERFICIALIZATION AV FISTULA - LEFT ARM performed by Annabel Castillo MD at [facility] RPR RETINAL [place] W/VITRECTOMY ANY METH Left 04/10/2018    PARS PLANA VITRECTOMY 25 GAUGE RETINAL DETACHMENT REPAIR air fluid exchange, endolaser performed by Harsh Rodriguez MD at [address] [facility]      L2    TRANSESOPHAGEAL ECHOCARDIOGRAM  11/11/2021    Dr. Michael Martinez    TRANSESOPHAGEAL ECHOCARDIOGRAM  04/19/2022        TUNNELED VENOUS CATHETER PLACEMENT  11/15/2017    VITRECTOMY Left 04/10/2018    PARS PLANA VITRECTOMY; RETINAL DETACHMENT REPAIR; GAS BUBBLE; LASER LEFT EYE       Social History:  Social History     Tobacco Use   Smoking Status Never   Smokeless Tobacco Never     Social History     Substance and Sexual Activity   Alcohol Use No     Social History     Substance and Sexual Activity   Drug Use No     Single    Family History:       Problem Relation Age of Onset    Breast Cancer Mother 61    Hypertension Mother     Heart Disease Father     Prostate Cancer Father     Breast Cancer Maternal Grandmother 60       Review of Systems:  Not available    Allergies: Allergies   Allergen Reactions    Furosemide Swelling and Other (See Comments)     Patient states \"I swelled up like a pig. \" states her kidneys shut down. Patient tolerates Bumex.           Current Medications:   glucose chewable tablet 16 g, PRN  dextrose bolus 10% 125 mL, PRN   Or  dextrose bolus 10% 250 mL, PRN  glucagon (rDNA) injection 1 mg, PRN  dextrose 10 % infusion, Continuous PRN  midodrine (PROAMATINE) tablet 10 mg, TID WC  norepinephrine (LEVOPHED) 16 mg in dextrose 5% 250 mL infusion, Continuous  0.9 % sodium chloride bolus, Once  piperacillin-tazobactam (ZOSYN) 4,500 mg in dextrose 5 % 100 mL IVPB ([id number]), Q12H  dextrose 5 % solution, Continuous  pantoprazole (PROTONIX) tablet 40 mg, QAM AC  doxycycline hyclate (VIBRAMYCIN) capsule 100 mg, 2 times per day  [START ON 8/4/2022] gabapentin (NEURONTIN) capsule 100 mg, Nightly  fentaNYL (SUBLIMAZE) injection 25 mcg, Once  EPINEPHrine 1 MG/10ML injection,   hydrocortisone sodium succinate PF (SOLU-CORTEF) injection 100 mg, Q8H  fentaNYL 10 mcg/ml in 0.9%  ml infusion, Continuous  vancomycin (VANCOCIN) 1,250 mg in dextrose 5 % 250 mL IVPB, Once  vancomycin (VANCOCIN) intermittent dosing (placeholder), RX Placeholder  white petrolatum ointment, TID PRN  0.9 % sodium chloride bolus, Once  sodium chloride flush 0.9 % injection 5-40 mL, 2 times per day  sodium chloride flush 0.9 % injection 5-40 mL, PRN  0.9 % sodium chloride infusion, PRN  ondansetron (ZOFRAN-ODT) disintegrating tablet 4 mg, Q8H PRN   Or  ondansetron (ZOFRAN) injection 4 mg, Q6H PRN  polyethylene glycol (GLYCOLAX) packet 17 g, Daily PRN  acetaminophen (TYLENOL) tablet 650 mg, Q6H PRN   Or  acetaminophen (TYLENOL) suppository 650 mg, Q6H PRN  allopurinol (ZYLOPRIM) tablet 100 mg, Daily  aspirin EC tablet 81 mg, QAM  atorvastatin (LIPITOR) tablet 80 mg, Nightly  brimonidine (ALPHAGAN) 0.2 % ophthalmic solution 1 drop, BID  [Held by provider] bumetanide (BUMEX) tablet 2 mg, Once per day on Sun Tue Thu  lidocaine-prilocaine (EMLA) cream 1 Dose, See Admin Instructions  latanoprost (XALATAN) 0.005 % ophthalmic solution 1 drop, Nightly  [Held by provider] oxyCODONE-acetaminophen (PERCOCET) 5-325 MG per tablet 2 tablet, Q8H PRN  [Held by provider] sertraline (ZOLOFT) tablet 25 mg, Daily  ticagrelor (BRILINTA) tablet 90 mg, BID  white petrolatum ointment, BID  heparin (porcine) injection 5,000 Units, Q8H  0.9 % sodium chloride bolus, Once       Physical Exam:  BP (!) 123/26   Pulse 73   Temp 97.5 °F (36.4 °C) (Axillary)   Resp 16   Ht 5' 10\" (1.778 m)   SpO2 96%   BMI 28.70 kg/m²  I Body mass index is 28.7 kg/m². I   Wt Readings from Last 1 Encounters:   07/31/22 200 lb (90.7 kg)          HEENT: Normocephalic, atraumatic, no lesions or abnormalities noted. Neck:  no masses, nodes or bruits; no cervical tenderness on palpation. Lungs: Intubated to ventilator. CV: RRR without gallops or murmurs     Extremities: no c/c; increased diffuse edema in bilateral upper extremity greater than lower extremities. Neurologic Exam   Mental Status: Unresponsive on ventilator  Cranial Nerves: Asymmetric pupils with right greater than left and no significant response to light;    Able to assess visual fields or eye movements. No spontaneous eye movements with negative doll's eye maneuver; no eye closure response to corneal reflex testing with note relative ptosis in the left eye when compared to the right; No response to noxious stimuli; Unable to assess gag; Motor Exam: No spontaneous movements with flaccid extremities bilaterally. Sensory Exam: No response to noxious stimuli. Cerebella Exam: No spontaneous movements or posturing observed. Gait:  Gait was not tested. Reflexes: Absent reflexes in bilateral lower and upper extremities; Babinski testing notable for absent right great toe with mute toe with left Babinski testing.     Labs:    CBC:   Recent Labs     08/02/22  0625 08/02/22  1848 08/03/22  0402   WBC 3.0* 2.7* 2.8*   HGB 7.8* 7.6* 8.1*   PLT 72* 81* 91*   MCV 88.2 87.1 87.2   MCH 27.2 27.1 28.0   MCHC 30.8* 31.1* 32.1   RDW 17.9* 18.0* 18.0*     CMP:  Recent Labs     08/02/22  1152 08/02/22  1848 08/03/22  0402    141 137   K 3.1* 3.3* 3.8    105 103   CO2 26 26 23   BUN 9 11 12   CREATININE 1.9* 2.2* 2.4*   GFRAA 32 27 24   LABGLOM 32 27 24   GLUCOSE 70* 131* 85   CALCIUM 8.1* 8.6 8.5*     Liver:   Recent Labs     08/02/22  0115   AST 65*   ALT 62*   ALKPHOS 161*   PROT 5.1*   LABALBU 2.8*   BILITOT 0.3     INR:   Recent Labs     08/01/22  1243   PROTIME 11.0   INR 1.0       ToxicologyNo results for input(s): PHENYTOIN, CARBTOT, PHENOBARB, VALPROATE in the last 72 hours. Invalid input(s): LAMOTRIG,  KEPPRA  No results for input(s): AMPMETHURSCR, BARBTQTU, BDZQTU, CANNABQUANT, COCMETQTU, OPIAU, PCPQUANT in the last 72 hours. Radiology:  CT ABDOMEN PELVIS W WO CONTRAST Additional Contrast? Oral    Result Date: 7/17/2022  EXAMINATION: CT OF THE ABDOMEN AND PELVIS WITH AND WITHOUT CONTRAST 7/17/2022 5:44 pm TECHNIQUE: CT of the abdomen and pelvis was performed with and without the administration of intravenous contrast.  Multiplanar reformatted images are provided for review. Automated exposure control, iterative reconstruction, and/or weight based adjustment of the mA/kV was utilized to reduce the radiation dose to as low as reasonably achievable. COMPARISON: CT abdomen pelvis 06/21/2022 HISTORY: ORDERING SYSTEM PROVIDED HISTORY: abdominal pain TECHNOLOGIST PROVIDED HISTORY: Pt is a dialysis patient who will receive dialysis today Reason for exam:->abdominal pain Additional Contrast?->Oral FINDINGS: Lower Chest: Coronary disease. Enlarged pulmonary trunk. Trace left-greater-than-right pleural effusions, new from prior. Areas of atelectasis in the visualized lungs. Organs: Status post cholecystectomy. Thickened adrenal glands. Atrophic kidneys with bilateral cysts. Subcentimeter low-density foci in the kidneys are too small to accurately characterize. No acute pancreatic abnormality. GI/Bowel: No free air. Trace free fluid in the presacral region.   Mild colonic diverticulosis. No bowel obstruction. Noninflamed appendix. Pelvis: Decreased bladder wall thickening. Unremarkable reproductive organs. Peritoneum/Retroperitoneum: No abdominal aortic aneurysm. Extensive atherosclerotic disease. Bones/Soft Tissues: Anasarca. Wide-mouth ventral abdominal wall hernia. Spinal fusion hardware. Abnormal mineralization of the bones suggestive of an osteodystrophy such as renal osteodystrophy. Spinal stimulator noted. Stable erosions at L1-2 and L2-3 endplates. Scattered Schmorl's nodes again noted. Stable lumbar spine endplate erosions. This may represent dialysis related spondyloarthropathy. MRI lumbar spine would better differentiate from infectious discitis-osteomyelitis, if clinically indicated. Trace pleural effusions, new from prior. Anasarca. Decreased bladder wall thickening; correlate with urinalysis to evaluate for UTI. No evidence of pyelonephritis. See other incidental findings above. CT ABDOMEN PELVIS WO CONTRAST Additional Contrast? None    Result Date: 8/3/2022  EXAMINATION: CT OF THE CHEST WITHOUT CONTRAST; CT OF THE ABDOMEN AND PELVIS WITHOUT CONTRAST 8/3/2022 12:35 pm TECHNIQUE: CT of the chest was performed without the administration of intravenous contrast. Multiplanar reformatted images are provided for review. Automated exposure control, iterative reconstruction, and/or weight based adjustment of the mA/kV was utilized to reduce the radiation dose to as low as reasonably achievable.; CT of the abdomen and pelvis was performed without the administration of intravenous contrast. Multiplanar reformatted images are provided for review. Automated exposure control, iterative reconstruction, and/or weight based adjustment of the mA/kV was utilized to reduce the radiation dose to as low as reasonably achievable.  COMPARISON: None HISTORY: ORDERING SYSTEM PROVIDED HISTORY: wound s/p Med port removal TECHNOLOGIST PROVIDED HISTORY: Reason for exam:->wound s/p Med port removal What reading provider will be dictating this exam?->CRC; ORDERING SYSTEM PROVIDED HISTORY: r/o infectious source, pt has hx of spinal stimulator TECHNOLOGIST PROVIDED HISTORY: Reason for exam:->r/o infectious source, pt has hx of spinal stimulator Additional Contrast?->None What reading provider will be dictating this exam?->CRC FINDINGS: Chest: Mediastinum: Thyroid is heterogeneous with subcentimeter nodularity bilaterally left greater than right. Cardiac chambers are enlarged. Extensive coronary artery calcification is identified. There is no pericardial effusion. Nasogastric tube identified tip in the stomach. Vascular calcifications seen within the thoracic aorta. Lungs/pleura: There moderate-sized bilateral pleural effusions. Patchy ground-glass opacity identified within the lower lung fields bilaterally. The upper lung fields are grossly clear. Findings may suggest atelectatic change or infiltrate. Clinical correlation is needed. Soft Tissues/Bones: There is extensive anasarca identified within the subcutaneous tissues. There is ulceration identified and skin irregularity along the right anterior chest wall likely from prior port placement. There is a small radiopaque density identified within the soft tissues just superior to the irregularity. Findings may suggest calcification however small foreign body cannot be completely excluded and clinical correlation is needed. Abdomen/Pelvis: Organs: GI/Bowel: The liver is homogeneous in appearance. The spleen is unremarkable. Pancreas is unremarkable. There is enlargement identified diffusely in the adrenal glands bilaterally suggesting hyperplasia. There is small cyst identified on the kidneys bilaterally. No significant obstruction seen in the renal collecting systems. No stones. Pelvis: The bladder is decompressed. The uterus is unremarkable. Peritoneum/Retroperitoneum: There is no abdominal or retroperitoneal lymphadenopathy. Medical Condition (MA) What reading provider will be dictating this exam?->CRC FINDINGS: BRAIN/VENTRICLES: The ventricles are normal size, position and contour. Cortical sulci and sylvian fissures are mildly prominent. There are no masses or mass effect. There are no parenchymal hemorrhages or extra-axial fluid collections. There is a small hypodense area in the right jerardo not demonstrated previously. The cerebellum is unremarkable. ORBITS: The visualized portion of the orbits demonstrate no acute abnormality. SINUSES: The visualized paranasal sinuses and mastoid air cells demonstrate no acute abnormality. SOFT TISSUES/SKULL:  No definite fractures. There is a small left periorbital soft tissue contusion and hematoma, stable to slightly improved. Small hypodensity right jerardo not demonstrated previously. Acute or subacute lacunar infarct at this location cannot be excluded. This finding could be more fully evaluated with MRI. CT Head WO Contrast    Result Date: 7/31/2022  EXAMINATION: CT OF THE HEAD WITHOUT CONTRAST  7/31/2022 6:57 am TECHNIQUE: CT of the head was performed without the administration of intravenous contrast. Automated exposure control, iterative reconstruction, and/or weight based adjustment of the mA/kV was utilized to reduce the radiation dose to as low as reasonably achievable. COMPARISON: None. HISTORY: ORDERING SYSTEM PROVIDED HISTORY: fall TECHNOLOGIST PROVIDED HISTORY: Reason for exam:->fall Has a \"code stroke\" or \"stroke alert\" been called? ->No Decision Support Exception - unselect if not a suspected or confirmed emergency medical condition->Emergency Medical Condition (MA) FINDINGS: BRAIN/VENTRICLES: Physiologic calcifications in the basal ganglia bilaterally. There is no acute intracranial hemorrhage, mass effect or midline shift. No abnormal extra-axial fluid collection. The gray-white differentiation is maintained without evidence of an acute infarct.   There is no evidence of hemorrhage. CT CHEST WO CONTRAST    Result Date: 8/3/2022  EXAMINATION: CT OF THE CHEST WITHOUT CONTRAST; CT OF THE ABDOMEN AND PELVIS WITHOUT CONTRAST 8/3/2022 12:35 pm TECHNIQUE: CT of the chest was performed without the administration of intravenous contrast. Multiplanar reformatted images are provided for review. Automated exposure control, iterative reconstruction, and/or weight based adjustment of the mA/kV was utilized to reduce the radiation dose to as low as reasonably achievable.; CT of the abdomen and pelvis was performed without the administration of intravenous contrast. Multiplanar reformatted images are provided for review. Automated exposure control, iterative reconstruction, and/or weight based adjustment of the mA/kV was utilized to reduce the radiation dose to as low as reasonably achievable. COMPARISON: None HISTORY: ORDERING SYSTEM PROVIDED HISTORY: wound s/p Med port removal TECHNOLOGIST PROVIDED HISTORY: Reason for exam:->wound s/p Med port removal What reading provider will be dictating this exam?->CRC; ORDERING SYSTEM PROVIDED HISTORY: r/o infectious source, pt has hx of spinal stimulator TECHNOLOGIST PROVIDED HISTORY: Reason for exam:->r/o infectious source, pt has hx of spinal stimulator Additional Contrast?->None What reading provider will be dictating this exam?->CRC FINDINGS: Chest: Mediastinum: Thyroid is heterogeneous with subcentimeter nodularity bilaterally left greater than right. Cardiac chambers are enlarged. Extensive coronary artery calcification is identified. There is no pericardial effusion. Nasogastric tube identified tip in the stomach. Vascular calcifications seen within the thoracic aorta. Lungs/pleura: There moderate-sized bilateral pleural effusions. Patchy ground-glass opacity identified within the lower lung fields bilaterally. The upper lung fields are grossly clear. Findings may suggest atelectatic change or infiltrate.   Clinical correlation is needed. Soft Tissues/Bones: There is extensive anasarca identified within the subcutaneous tissues. There is ulceration identified and skin irregularity along the right anterior chest wall likely from prior port placement. There is a small radiopaque density identified within the soft tissues just superior to the irregularity. Findings may suggest calcification however small foreign body cannot be completely excluded and clinical correlation is needed. Abdomen/Pelvis: Organs: GI/Bowel: The liver is homogeneous in appearance. The spleen is unremarkable. Pancreas is unremarkable. There is enlargement identified diffusely in the adrenal glands bilaterally suggesting hyperplasia. There is small cyst identified on the kidneys bilaterally. No significant obstruction seen in the renal collecting systems. No stones. Pelvis: The bladder is decompressed. The uterus is unremarkable. Peritoneum/Retroperitoneum: There is no abdominal or retroperitoneal lymphadenopathy. Vascular calcifications seen within the abdominal aorta and iliac vessels. No pelvic adenopathy. Bones/Soft Tissues: The bony structures reveal degenerative changes seen within the spine and pelvis. No ventral abdominal wall mass or defect. There is hardware identified fusing posteriorly the lower lumbar spine with cortical dura destruction and irregularity of the inferior endplate of L1 and superior endplate of L2. Findings suggesting possible chronic osteomyelitis. Extensive anasarca seen throughout the soft tissues. Extensive anasarca seen throughout the soft tissues the chest abdomen and pelvis. Irregularity identified at the site of the prior ruth catheter along the right anterior chest wall with small radiopaque density seen in the superior soft tissues suggesting possibly calcification or radiopaque foreign body. Small bilateral pleural effusions with atelectatic changes seen at the lung bases bilaterally or infiltrate.  Chronic interest were drawn around the stomach and geometric means were calculated. All medications capable of altering motility were held. COMPARISON: None. HISTORY: ORDERING SYSTEM PROVIDED HISTORY: fullness, abdominal pain TECHNOLOGIST PROVIDED HISTORY: Reason for exam:->fullness, abdominal pain What reading provider will be dictating this exam?->CRC FINDINGS: The gastric emptying were calculated as follows, extrapolated from the time activity curve: At 60 min, there is 90% emptying of the stomach. (Normal range is 10-70%) At 120 min, there is 100% emptying of the stomach. (Normal is >40%) At 240 min, there is 100% emptying of the stomach. (Normal is >90%) No significant esophageal retention, but reflux was noted at 1 hour. Rapid early phase emptying which can be related to diabetes. No evidence of gastroparesis. However, reflux noted at 1 hour. XR CHEST PORTABLE    Result Date: 8/1/2022  EXAMINATION: ONE XRAY VIEW OF THE CHEST 8/1/2022 12:56 pm COMPARISON: Previous chest x-ray of 07/11/2022 and CT scan of 06/15/2022 HISTORY: ORDERING SYSTEM PROVIDED HISTORY: altered TECHNOLOGIST PROVIDED HISTORY: Reason for exam:->altered What reading provider will be dictating this exam?->CRC FINDINGS: The cardiac silhouette is within normal limits. The right lung is clear. There is chronic elevation right hemidiaphragm There is a very small patchy opacity seen within the left lung base which could represent atelectasis or less likely pneumonia. The left upper lobe is clear. 1. Small patchy opacity within the left lung base which could represent atelectasis or pneumonia. Atelectasis is favored. 2. The right lung is clear. XR CHEST PORTABLE    Result Date: 7/11/2022  EXAMINATION: ONE XRAY VIEW OF THE CHEST 7/11/2022 1:23 am COMPARISON: 06/15/2022 HISTORY: ORDERING SYSTEM PROVIDED HISTORY: hypotension TECHNOLOGIST PROVIDED HISTORY: Reason for exam:->hypotension FINDINGS: The lungs are without acute focal process. There is no effusion or pneumothorax. The cardiomediastinal silhouette is without acute process. The osseous structures are without acute process. No acute process. XR CHEST ABDOMEN NG PLACEMENT    Result Date: 8/2/2022  EXAMINATION: ONE SUPINE XRAY VIEW(S) OF THE ABDOMEN 8/2/2022 7:00 pm COMPARISON: None. HISTORY: ORDERING SYSTEM PROVIDED HISTORY: hyoptension TECHNOLOGIST PROVIDED HISTORY: Reason for exam:->hyoptension What reading provider will be dictating this exam?->CRC FINDINGS: Single frontal view of the lower chest and upper abdomen demonstrate and NG tube in position with the tip approximately 12 cm below the GE junction in the body of the stomach. There are multiple EKG leads overlying the upper abdomen and there is evidence of previous laminectomy pedicle screw fixation of the lower lumbar spine. Satisfactory placement of NG tube as described. The patient's records from referring provider and available information in the EHR was reviewed. Impression:  Acute encephalopathy starting with dialysis and now with 2 episodes of hypotension in hospital but significantly altered her degree of responsiveness. Blood pressures have remained low with requirement for pressors plan to control BP. Respiratory failure now on ventilator management  History of end-stage renal disease on hemodialysis  Right pontine stroke: Undetermined age with noted decreased responsiveness that may be attributable to her pontine stroke and TAYLER impairment and impaired arousal.  Brain MRI study is warranted. Principal Problem:    Stroke, lacunar (HCC)  Active Problems:    Acute CVA (cerebrovascular accident) ([facility] [address]    CKD (chronic kidney disease) stage 3, GFR 30-59 ml/min ([facility])  Resolved Problems:    * No resolved hospital problems. *      Recommendations:                                            MRI of the brain for further evaluation. EEG to assess rate activity.   Continue medical management with nephrology and ID services following. Further pending results of studies       It was my pleasure to evaluate Claudia Kinney today. Please call with questions.       Electronically signed by Abbe Garces MD on 8/3/2022 at 6:49 PM

## 2022-08-03 NOTE — PLAN OF CARE
Problem: Chronic Conditions and Co-morbidities  Goal: Patient's chronic conditions and co-morbidity symptoms are monitored and maintained or improved  8/3/2022 0054 by Mick Sosa RN  Outcome: Progressing  8/2/2022 2123 by Mick Sosa RN  Outcome: Progressing     Problem: Discharge Planning  Goal: Discharge to home or other facility with appropriate resources  8/3/2022 0054 by Mick Sosa RN  Outcome: Progressing  8/2/2022 2123 by Mick Sosa RN  Outcome: Progressing     Problem: Pain  Goal: Verbalizes/displays adequate comfort level or baseline comfort level  8/3/2022 0054 by Mick Sosa RN  Outcome: Progressing  8/2/2022 2123 by Mick Sosa RN  Outcome: Progressing     Problem: Safety - Adult  Goal: Free from fall injury  8/3/2022 0054 by Mick Sosa RN  Outcome: Progressing  8/2/2022 2123 by Mick Sosa RN  Outcome: Progressing     Problem: ABCDS Injury Assessment  Goal: Absence of physical injury  8/3/2022 0054 by Mick Sosa RN  Outcome: Progressing  8/2/2022 2123 by Mick Sosa RN  Outcome: Progressing     Problem: Skin/Tissue Integrity  Goal: Absence of new skin breakdown  Description: 1. Monitor for areas of redness and/or skin breakdown  2. Assess vascular access sites hourly  3. Every 4-6 hours minimum:  Change oxygen saturation probe site  4. Every 4-6 hours:  If on nasal continuous positive airway pressure, respiratory therapy assess nares and determine need for appliance change or resting period.   8/3/2022 0054 by Mick Sosa RN  Outcome: Progressing  8/2/2022 2123 by Mick Sosa RN  Outcome: Progressing     Problem: Cardiovascular - Adult  Goal: Maintains optimal cardiac output and hemodynamic stability  8/3/2022 0054 by Mick Sosa RN  Outcome: Progressing  8/2/2022 2123 by Mick Sosa RN  Outcome: Progressing     Problem: Metabolic/Fluid and Electrolytes - Adult  Goal: Hemodynamic stability and optimal renal function maintained  8/3/2022 0054 by Mady Robledo RN  Outcome: Progressing  8/2/2022 2123 by Mady Robledo RN  Outcome: Progressing

## 2022-08-03 NOTE — PROGRESS NOTES
Pharmacy Consultation Note  (Antibiotic Dosing and Monitoring)    Initial consult date: 08/03/22  Consulting physician/provider: Matthew Kwon   Drug: Vancomycin  Indication: Sepsis of unknown etiology    Age/  Gender Height Weight IBW  Allergy Information   67 y.o./female 5' 10\" (177.8 cm)       Ideal body weight: 68.5 kg (151 lb 0.2 oz)  Adjusted ideal body weight: 77.4 kg (170 lb 9.7 oz)   Furosemide      Renal Function:  Recent Labs     08/02/22  0625 08/02/22  1848 08/03/22  0402   BUN 9 11 12   CREATININE 1.9* 2.2* 2.4*       Intake/Output Summary (Last 24 hours) at 8/3/2022 1820  Last data filed at 8/3/2022 1430  Gross per 24 hour   Intake 5100.29 ml   Output 0 ml   Net 5100.29 ml       Vancomycin Monitoring:  Trough:  No results for input(s): VANCOTROUGH in the last 72 hours. Random:  No results for input(s): VANCORANDOM in the last 72 hours. Vancomycin Administration Times:  Recent vancomycin administrations        No vancomycin IV orders with administrations found. Orders not given:            vancomycin (VANCOCIN) 1,250 mg in dextrose 5 % 250 mL IVPB    vancomycin (VANCOCIN) intermittent dosing (placeholder)                    Assessment:  Patient is a 78 y.o. female who has been initiated on vancomycin  Estimated Creatinine Clearance: 28 mL/min (A) (based on SCr of 2.4 mg/dL (H)). To dose vancomycin, pharmacy will be utilizing dosing based off of levels due to patient requiring hemodialysis    Plan:  Vancomycin 1250mg IV once  Will check vancomycin levels when appropriate  Will continue to monitor renal function   Clinical pharmacy to follow      Linnette Escobedo [address] [place] [address] 8/3/2022 6:20 PM       #### Per ID yesterday - pt was only to continue with zosyn. However due to a change in status overnight, only a one time dose of vancomycin was to be ordered for the patient until ID followed up on 8/4/22.   Discussed with team     Emmanuelle Valdez, PharmD, BCPS, BCCCP 8/4/2022 10:30 AM

## 2022-08-04 ENCOUNTER — APPOINTMENT (OUTPATIENT)
Dept: GENERAL RADIOLOGY | Age: 67
DRG: 064 | End: 2022-08-04
Payer: COMMERCIAL

## 2022-08-04 ENCOUNTER — APPOINTMENT (OUTPATIENT)
Dept: CT IMAGING | Age: 67
DRG: 064 | End: 2022-08-04
Payer: COMMERCIAL

## 2022-08-04 ENCOUNTER — APPOINTMENT (OUTPATIENT)
Dept: NEUROLOGY | Age: 67
DRG: 064 | End: 2022-08-04
Payer: COMMERCIAL

## 2022-08-04 LAB
AADO2: 62.5 MMHG
AADO2: 86 MMHG
ANION GAP SERPL CALCULATED.3IONS-SCNC: 13 MMOL/L (ref 7–16)
ANION GAP SERPL CALCULATED.3IONS-SCNC: 13 MMOL/L (ref 7–16)
ANION GAP SERPL CALCULATED.3IONS-SCNC: 17 MMOL/L (ref 7–16)
ANION GAP SERPL CALCULATED.3IONS-SCNC: 9 MMOL/L (ref 7–16)
ANISOCYTOSIS: ABNORMAL
B.E.: -0.3 MMOL/L (ref -3–3)
B.E.: -4.8 MMOL/L (ref -3–3)
BASOPHILS ABSOLUTE: 0 E9/L (ref 0–0.2)
BASOPHILS RELATIVE PERCENT: 0 % (ref 0–2)
BUN BLDV-MCNC: 12 MG/DL (ref 6–23)
BUN BLDV-MCNC: 16 MG/DL (ref 6–23)
BUN BLDV-MCNC: 16 MG/DL (ref 6–23)
BUN BLDV-MCNC: 17 MG/DL (ref 6–23)
CALCIUM SERPL-MCNC: 8.6 MG/DL (ref 8.6–10.2)
CALCIUM SERPL-MCNC: 8.8 MG/DL (ref 8.6–10.2)
CHLORIDE BLD-SCNC: 100 MMOL/L (ref 98–107)
CHLORIDE BLD-SCNC: 101 MMOL/L (ref 98–107)
CHLORIDE BLD-SCNC: 103 MMOL/L (ref 98–107)
CHLORIDE BLD-SCNC: 89 MMOL/L (ref 98–107)
CHOLESTEROL, FASTING: 85 MG/DL (ref 0–199)
CO2: 18 MMOL/L (ref 22–29)
CO2: 18 MMOL/L (ref 22–29)
CO2: 19 MMOL/L (ref 22–29)
CO2: 20 MMOL/L (ref 22–29)
COHB: 0.5 % (ref 0–1.5)
COHB: 0.9 % (ref 0–1.5)
CREAT SERPL-MCNC: 2.2 MG/DL (ref 0.5–1)
CREAT SERPL-MCNC: 2.7 MG/DL (ref 0.5–1)
CRITICAL: ABNORMAL
CRITICAL: ABNORMAL
DATE ANALYZED: ABNORMAL
DATE ANALYZED: ABNORMAL
DATE OF COLLECTION: ABNORMAL
DATE OF COLLECTION: ABNORMAL
EOSINOPHILS ABSOLUTE: 0 E9/L (ref 0.05–0.5)
EOSINOPHILS RELATIVE PERCENT: 0.2 % (ref 0–6)
FIO2: 40 %
FIO2: 40 %
GFR AFRICAN AMERICAN: 21
GFR AFRICAN AMERICAN: 27
GFR NON-AFRICAN AMERICAN: 21 ML/MIN/1.73
GFR NON-AFRICAN AMERICAN: 27 ML/MIN/1.73
GLUCOSE BLD-MCNC: 143 MG/DL (ref 74–99)
GLUCOSE BLD-MCNC: 145 MG/DL (ref 74–99)
GLUCOSE BLD-MCNC: 153 MG/DL (ref 74–99)
GLUCOSE BLD-MCNC: 178 MG/DL (ref 74–99)
HBA1C MFR BLD: 5.1 % (ref 4–5.6)
HCO3: 18.8 MMOL/L (ref 22–26)
HCO3: 22.1 MMOL/L (ref 22–26)
HCT VFR BLD CALC: 24.6 % (ref 34–48)
HDLC SERPL-MCNC: 45 MG/DL
HEMOGLOBIN: 8.4 G/DL (ref 11.5–15.5)
HHB: 0.7 % (ref 0–5)
HHB: 1 % (ref 0–5)
HYPOCHROMIA: ABNORMAL
LAB: ABNORMAL
LAB: ABNORMAL
LACTIC ACID: 2.1 MMOL/L (ref 0.5–2.2)
LDL CHOLESTEROL CALCULATED: 25 MG/DL (ref 0–99)
LYMPHOCYTES ABSOLUTE: 0.46 E9/L (ref 1.5–4)
LYMPHOCYTES RELATIVE PERCENT: 11.3 % (ref 20–42)
Lab: ABNORMAL
Lab: ABNORMAL
MAGNESIUM: 1.6 MG/DL (ref 1.6–2.6)
MAGNESIUM: 1.8 MG/DL (ref 1.6–2.6)
MCH RBC QN AUTO: 28.5 PG (ref 26–35)
MCHC RBC AUTO-ENTMCNC: 34.1 % (ref 32–34.5)
MCV RBC AUTO: 83.4 FL (ref 80–99.9)
METER GLUCOSE: 141 MG/DL (ref 74–99)
METER GLUCOSE: 155 MG/DL (ref 74–99)
METER GLUCOSE: 175 MG/DL (ref 74–99)
METHB: 0.1 % (ref 0–1.5)
METHB: 0.3 % (ref 0–1.5)
MODE: AC
MODE: AC
MONOCYTES ABSOLUTE: 0.08 E9/L (ref 0.1–0.95)
MONOCYTES RELATIVE PERCENT: 1.7 % (ref 2–12)
NEUTROPHILS ABSOLUTE: 3.65 E9/L (ref 1.8–7.3)
NEUTROPHILS RELATIVE PERCENT: 87 % (ref 43–80)
NUCLEATED RED BLOOD CELLS: 3.5 /100 WBC
O2 SATURATION: 99 % (ref 92–98.5)
O2 SATURATION: 99.4 % (ref 92–98.5)
O2HB: 98.2 % (ref 94–97)
O2HB: 98.3 % (ref 94–97)
OPERATOR ID: 2593
OPERATOR ID: 359
OVALOCYTES: ABNORMAL
PATIENT TEMP: 37 C
PATIENT TEMP: 37 C
PCO2: 28.1 MMHG (ref 35–45)
PCO2: 30.2 MMHG (ref 35–45)
PDW BLD-RTO: 17.2 FL (ref 11.5–15)
PEEP/CPAP: 8 CMH2O
PEEP/CPAP: 8 CMH2O
PFO2: 3.86 MMHG/%
PFO2: 4.51 MMHG/%
PH BLOOD GAS: 7.41 (ref 7.35–7.45)
PH BLOOD GAS: 7.51 (ref 7.35–7.45)
PHOSPHORUS: 2.5 MG/DL (ref 2.5–4.5)
PLATELET # BLD: 100 E9/L (ref 130–450)
PMV BLD AUTO: ABNORMAL FL (ref 7–12)
PO2: 154.5 MMHG (ref 75–100)
PO2: 180.4 MMHG (ref 75–100)
POIKILOCYTES: ABNORMAL
POLYCHROMASIA: ABNORMAL
POTASSIUM SERPL-SCNC: 3.6 MMOL/L (ref 3.5–5)
POTASSIUM SERPL-SCNC: 4 MMOL/L (ref 3.5–5)
POTASSIUM SERPL-SCNC: 4.7 MMOL/L (ref 3.5–5)
POTASSIUM SERPL-SCNC: 4.8 MMOL/L (ref 3.5–5)
RBC # BLD: 2.95 E12/L (ref 3.5–5.5)
RI(T): 0.35
RI(T): 0.56
RR MECHANICAL: 14 B/MIN
RR MECHANICAL: 14 B/MIN
SODIUM BLD-SCNC: 118 MMOL/L (ref 132–146)
SODIUM BLD-SCNC: 132 MMOL/L (ref 132–146)
SODIUM BLD-SCNC: 135 MMOL/L (ref 132–146)
SODIUM BLD-SCNC: 135 MMOL/L (ref 132–146)
SOURCE, BLOOD GAS: ABNORMAL
SOURCE, BLOOD GAS: ABNORMAL
TARGET CELLS: ABNORMAL
THB: 11.1 G/DL (ref 11.5–16.5)
THB: 9.1 G/DL (ref 11.5–16.5)
TIME ANALYZED: 1802
TIME ANALYZED: 443
TRIGLYCERIDE, FASTING: 75 MG/DL (ref 0–149)
VLDLC SERPL CALC-MCNC: 15 MG/DL
VT MECHANICAL: 380 ML
VT MECHANICAL: 380 ML
WBC # BLD: 4.2 E9/L (ref 4.5–11.5)

## 2022-08-04 PROCEDURE — 2580000003 HC RX 258: Performed by: INTERNAL MEDICINE

## 2022-08-04 PROCEDURE — 2000000000 HC ICU R&B

## 2022-08-04 PROCEDURE — 70450 CT HEAD/BRAIN W/O DYE: CPT

## 2022-08-04 PROCEDURE — 84100 ASSAY OF PHOSPHORUS: CPT

## 2022-08-04 PROCEDURE — 99291 CRITICAL CARE FIRST HOUR: CPT | Performed by: INTERNAL MEDICINE

## 2022-08-04 PROCEDURE — 6370000000 HC RX 637 (ALT 250 FOR IP): Performed by: SPECIALIST

## 2022-08-04 PROCEDURE — 6360000002 HC RX W HCPCS: Performed by: INTERNAL MEDICINE

## 2022-08-04 PROCEDURE — 83605 ASSAY OF LACTIC ACID: CPT

## 2022-08-04 PROCEDURE — 71045 X-RAY EXAM CHEST 1 VIEW: CPT

## 2022-08-04 PROCEDURE — 95822 EEG COMA OR SLEEP ONLY: CPT

## 2022-08-04 PROCEDURE — 37799 UNLISTED PX VASCULAR SURGERY: CPT

## 2022-08-04 PROCEDURE — 80048 BASIC METABOLIC PNL TOTAL CA: CPT

## 2022-08-04 PROCEDURE — 6360000002 HC RX W HCPCS

## 2022-08-04 PROCEDURE — 82805 BLOOD GASES W/O2 SATURATION: CPT

## 2022-08-04 PROCEDURE — 36415 COLL VENOUS BLD VENIPUNCTURE: CPT

## 2022-08-04 PROCEDURE — 6370000000 HC RX 637 (ALT 250 FOR IP): Performed by: FAMILY MEDICINE

## 2022-08-04 PROCEDURE — 90935 HEMODIALYSIS ONE EVALUATION: CPT

## 2022-08-04 PROCEDURE — 6370000000 HC RX 637 (ALT 250 FOR IP): Performed by: INTERNAL MEDICINE

## 2022-08-04 PROCEDURE — 83036 HEMOGLOBIN GLYCOSYLATED A1C: CPT

## 2022-08-04 PROCEDURE — 82962 GLUCOSE BLOOD TEST: CPT

## 2022-08-04 PROCEDURE — 80061 LIPID PANEL: CPT

## 2022-08-04 PROCEDURE — A4216 STERILE WATER/SALINE, 10 ML: HCPCS | Performed by: INTERNAL MEDICINE

## 2022-08-04 PROCEDURE — 87081 CULTURE SCREEN ONLY: CPT

## 2022-08-04 PROCEDURE — 5A1D70Z PERFORMANCE OF URINARY FILTRATION, INTERMITTENT, LESS THAN 6 HOURS PER DAY: ICD-10-PCS | Performed by: INTERNAL MEDICINE

## 2022-08-04 PROCEDURE — 99233 SBSQ HOSP IP/OBS HIGH 50: CPT | Performed by: NURSE PRACTITIONER

## 2022-08-04 PROCEDURE — 2500000003 HC RX 250 WO HCPCS: Performed by: INTERNAL MEDICINE

## 2022-08-04 PROCEDURE — 94003 VENT MGMT INPAT SUBQ DAY: CPT

## 2022-08-04 PROCEDURE — 2500000003 HC RX 250 WO HCPCS

## 2022-08-04 PROCEDURE — C9113 INJ PANTOPRAZOLE SODIUM, VIA: HCPCS | Performed by: INTERNAL MEDICINE

## 2022-08-04 PROCEDURE — 85025 COMPLETE CBC W/AUTO DIFF WBC: CPT

## 2022-08-04 PROCEDURE — 83735 ASSAY OF MAGNESIUM: CPT

## 2022-08-04 PROCEDURE — 6360000002 HC RX W HCPCS: Performed by: FAMILY MEDICINE

## 2022-08-04 RX ORDER — CHLORHEXIDINE GLUCONATE 0.12 MG/ML
15 RINSE ORAL 2 TIMES DAILY
Status: DISCONTINUED | OUTPATIENT
Start: 2022-08-04 | End: 2022-08-11 | Stop reason: HOSPADM

## 2022-08-04 RX ORDER — MAGNESIUM SULFATE 1 G/100ML
1000 INJECTION INTRAVENOUS ONCE
Status: COMPLETED | OUTPATIENT
Start: 2022-08-04 | End: 2022-08-04

## 2022-08-04 RX ORDER — POTASSIUM CHLORIDE 29.8 MG/ML
40 INJECTION INTRAVENOUS ONCE
Status: COMPLETED | OUTPATIENT
Start: 2022-08-04 | End: 2022-08-04

## 2022-08-04 RX ORDER — ASPIRIN 81 MG/1
81 TABLET, CHEWABLE ORAL DAILY
Status: DISCONTINUED | OUTPATIENT
Start: 2022-08-05 | End: 2022-08-11 | Stop reason: HOSPADM

## 2022-08-04 RX ADMIN — PIPERACILLIN AND TAZOBACTAM 4500 MG: 4; .5 INJECTION, POWDER, FOR SOLUTION INTRAVENOUS at 05:46

## 2022-08-04 RX ADMIN — BRIMONIDINE TARTRATE 1 DROP: 2 SOLUTION OPHTHALMIC at 20:25

## 2022-08-04 RX ADMIN — TICAGRELOR 90 MG: 90 TABLET ORAL at 08:49

## 2022-08-04 RX ADMIN — SODIUM CHLORIDE, PRESERVATIVE FREE 10 ML: 5 INJECTION INTRAVENOUS at 20:29

## 2022-08-04 RX ADMIN — HYDROCORTISONE SODIUM SUCCINATE 100 MG: 100 INJECTION, POWDER, FOR SOLUTION INTRAMUSCULAR; INTRAVENOUS at 02:28

## 2022-08-04 RX ADMIN — BRIMONIDINE TARTRATE 1 DROP: 2 SOLUTION OPHTHALMIC at 08:50

## 2022-08-04 RX ADMIN — HYDROCORTISONE SODIUM SUCCINATE 100 MG: 100 INJECTION, POWDER, FOR SOLUTION INTRAMUSCULAR; INTRAVENOUS at 09:04

## 2022-08-04 RX ADMIN — SODIUM CHLORIDE, PRESERVATIVE FREE 40 MG: 5 INJECTION INTRAVENOUS at 08:49

## 2022-08-04 RX ADMIN — PETROLATUM: 420 OINTMENT TOPICAL at 20:25

## 2022-08-04 RX ADMIN — HEPARIN SODIUM 5000 UNITS: 10000 INJECTION INTRAVENOUS; SUBCUTANEOUS at 05:48

## 2022-08-04 RX ADMIN — Medication 50 MCG/HR: at 08:48

## 2022-08-04 RX ADMIN — 0.12% CHLORHEXIDINE GLUCONATE 15 ML: 1.2 RINSE ORAL at 08:49

## 2022-08-04 RX ADMIN — TICAGRELOR 90 MG: 90 TABLET ORAL at 20:28

## 2022-08-04 RX ADMIN — WATER 2000 MG: 1 INJECTION INTRAMUSCULAR; INTRAVENOUS; SUBCUTANEOUS at 19:00

## 2022-08-04 RX ADMIN — ATORVASTATIN CALCIUM 80 MG: 40 TABLET, FILM COATED ORAL at 20:28

## 2022-08-04 RX ADMIN — ASPIRIN 81 MG: 81 TABLET, COATED ORAL at 08:49

## 2022-08-04 RX ADMIN — DOXYCYCLINE HYCLATE 100 MG: 100 CAPSULE ORAL at 20:29

## 2022-08-04 RX ADMIN — GABAPENTIN 100 MG: 100 CAPSULE ORAL at 20:29

## 2022-08-04 RX ADMIN — ALLOPURINOL 100 MG: 100 TABLET ORAL at 08:51

## 2022-08-04 RX ADMIN — LATANOPROST 1 DROP: 50 SOLUTION OPHTHALMIC at 20:25

## 2022-08-04 RX ADMIN — HEPARIN SODIUM 5000 UNITS: 10000 INJECTION INTRAVENOUS; SUBCUTANEOUS at 20:29

## 2022-08-04 RX ADMIN — DOXYCYCLINE HYCLATE 100 MG: 100 CAPSULE ORAL at 08:49

## 2022-08-04 RX ADMIN — MAGNESIUM SULFATE HEPTAHYDRATE 1000 MG: 1 INJECTION, SOLUTION INTRAVENOUS at 20:35

## 2022-08-04 RX ADMIN — Medication 5 MCG/MIN: at 09:19

## 2022-08-04 RX ADMIN — PETROLATUM: 420 OINTMENT TOPICAL at 08:50

## 2022-08-04 RX ADMIN — POTASSIUM CHLORIDE 40 MEQ: 29.8 INJECTION, SOLUTION INTRAVENOUS at 06:45

## 2022-08-04 RX ADMIN — 0.12% CHLORHEXIDINE GLUCONATE 15 ML: 1.2 RINSE ORAL at 20:25

## 2022-08-04 RX ADMIN — MIDODRINE HYDROCHLORIDE 10 MG: 10 TABLET ORAL at 08:49

## 2022-08-04 RX ADMIN — HYDROCORTISONE SODIUM SUCCINATE 100 MG: 100 INJECTION, POWDER, FOR SOLUTION INTRAMUSCULAR; INTRAVENOUS at 19:05

## 2022-08-04 ASSESSMENT — PULMONARY FUNCTION TESTS
PIF_VALUE: 21
PIF_VALUE: 21
PIF_VALUE: 22
PIF_VALUE: 20
PIF_VALUE: 21
PIF_VALUE: 21
PIF_VALUE: 50
PIF_VALUE: 20
PIF_VALUE: 22
PIF_VALUE: 20
PIF_VALUE: 21
PIF_VALUE: 20
PIF_VALUE: 21
PIF_VALUE: 21
PIF_VALUE: 20
PIF_VALUE: 22
PIF_VALUE: 20
PIF_VALUE: 20
PIF_VALUE: 23
PIF_VALUE: 20
PIF_VALUE: 21
PIF_VALUE: 22
PIF_VALUE: 22
PIF_VALUE: 20
PIF_VALUE: 22
PIF_VALUE: 22
PIF_VALUE: 20
PIF_VALUE: 21
PIF_VALUE: 20
PIF_VALUE: 20
PIF_VALUE: 21
PIF_VALUE: 21
PIF_VALUE: 22
PIF_VALUE: 23
PIF_VALUE: 21
PIF_VALUE: 20
PIF_VALUE: 21
PIF_VALUE: 22
PIF_VALUE: 21
PIF_VALUE: 20
PIF_VALUE: 21
PIF_VALUE: 20
PIF_VALUE: 22
PIF_VALUE: 21
PIF_VALUE: 21
PIF_VALUE: 20
PIF_VALUE: 20
PIF_VALUE: 21
PIF_VALUE: 23
PIF_VALUE: 21
PIF_VALUE: 20

## 2022-08-04 ASSESSMENT — PAIN SCALES - WONG BAKER

## 2022-08-04 ASSESSMENT — PAIN SCALES - GENERAL
PAINLEVEL_OUTOF10: 0

## 2022-08-04 NOTE — PLAN OF CARE
Problem: Respiratory - Adult  Goal: Achieves optimal ventilation and oxygenation  8/4/2022 0914 by Yesi Bain.  JACINTO David  Outcome: Progressing  Flowsheets (Taken 8/4/2022 0914)  Achieves optimal ventilation and oxygenation:   Assess for changes in respiratory status   Position to facilitate oxygenation and minimize respiratory effort   Assess the need for suctioning and aspirate as needed   Respiratory therapy support as indicated   Oxygen supplementation based on oxygen saturation or arterial blood gases

## 2022-08-04 NOTE — PROGRESS NOTES
Providers aware pt still not following commands, does open eyes to pain and withdraws to pain, very weak cough and gag but present. Assessment done by resident as well around 0530 this AM, will continue to monitor.

## 2022-08-04 NOTE — PLAN OF CARE
Problem: Respiratory - Adult  Goal: Achieves optimal ventilation and oxygenation  8/4/2022 0621 by Lennox Speaks, RCP  Outcome: Progressing

## 2022-08-04 NOTE — PROGRESS NOTES
Neuro Inpatient Follow Up Note       Kimberley Benitez is a 78 y.o.  female     Neurology is following for AMS    PMH: ESRD on HD MWF, diabetes, hyperlipidemia, hypertension, chronic osteomyelitis    Patient presented to ED on 8/1 for altered mental status from [place] at [facility] known well was unknown. Patient presented from dialysis due to slow responses after having full treatment. Dialysis nurse noticed that patient was not at her baseline with a blood glucose of 88. In ED patient was alert and oriented x4 but slow to respond with no acute complaints. CT head concerning for lacunar infarct with NIH of 3 in the ED. Patient was rapid response on 8/2 around 1 AM for hypotension with a BP of 90/19 and MAP of 42. Patient was awake, oriented and following commands with no neurologic deficit found. Patient did mention she was very tired and repeat blood pressure was 76/39. Patient had not received her scheduled midodrine but had received dialysis that day. IVF bolus ordered patient's IV line infiltrated. Patient received her midodrine and repeat blood pressure was 80/42; Bumex has been held during RRT. Patient found to have worsening mentation and ABG showing worsening hypoxia patient was subsequently intubated and sedated on fentanyl. Patient has been maintained on Levophed    Patient was intubated on 8/3 around 5:40 PM; hypothermic this morning otherwise vitals are stable on vent    ROS is limited due to intubation    Patient's brother is at bedside. Objective:     BP (!) 149/66   Pulse 67   Temp (!) 96 °F (35.6 °C)   Resp 14   Ht 5' 10\" (1.778 m)   Wt 180 lb (81.6 kg)   SpO2 100%   BMI 25.83 kg/m²     General appearance: intubated, opens eyes, appears stated age, and no distress  Head: normocephalic, without obvious abnormality, atraumatic  Eyes: conjunctivae/corneas clear.  .  Neck: supple, symmetrical, trachea midline and thyroid not enlarged  Lungs: unlabored breaths on vent Heart: regular rate and rhythm  Extremities: normal, atraumatic, no cyanosis, +3 edema to BUE   Skin: color, texture, turgor normal---no rashes or lesions      Mental Status: Intubated, sedated, opens eyes, follows simple commands--opens eyes and weakly  b/l to command    Speech/Language: intubated     Cranial Nerves: limited d/t patient's condition   I: smell NA   II: visual acuity  NA   II: visual fields Blinks to threat   II: pupils PERRL   III,VII: ptosis None   III,IV,VI: extraocular muscles  Pupils 3mm sluggish; ?L gaze preference   V: mastication    V: facial light touch sensation  Normal   V,VII: corneal reflex  Present   VII: facial muscle function - upper  Normal   VII: facial muscle function - lower Normal   VIII: hearing Blinks to claps and snaps, responds to voice   IX: soft palate elevation     IX,X: gag reflex Present   XI: trapezius strength     XI: sternocleidomastoid strength    XI: neck extension strength     XII: tongue strength       Motor:  Weak  bilaterally--weaker to R  Withdraws to pain in upper, no response to BLE   Normal bulk and tone  No abnormal movements    Sensory:  Minimal response to pain     Coordination:   Unable to test at present     Gait:  Unable to test at present     DTR:   Right Brachioradialis reflex 1+  Left Brachioradialis reflex 1+  Right Biceps reflex 1+  Left Biceps reflex 1+  Right Triceps reflex 1+  Left Triceps reflex 1+  Right Quadriceps reflex 0  Left Quadriceps reflex 0  Right Achilles reflex 0  Left Achilles reflex 0    No Babinskis  No Hummel's    No pathological reflexes    Laboratory/Radiology:     CBC with Differential:    Lab Results   Component Value Date/Time    WBC 4.2 08/04/2022 04:31 AM    RBC 2.95 08/04/2022 04:31 AM    HGB 8.4 08/04/2022 04:31 AM    HCT 24.6 08/04/2022 04:31 AM     08/04/2022 04:31 AM    MCV 83.4 08/04/2022 04:31 AM    MCH 28.5 08/04/2022 04:31 AM    MCHC 34.1 08/04/2022 04:31 AM    RDW 17.2 08/04/2022 04:31 AM NRBC 3.5 08/04/2022 04:31 AM    SEGSPCT 70 03/12/2014 10:53 AM    BANDSPCT 1 02/18/2015 10:35 AM    METASPCT 0.9 08/03/2022 04:02 AM    LYMPHOPCT 11.3 08/04/2022 04:31 AM    PROMYELOPCT 1.8 02/18/2017 04:00 AM    MONOPCT 1.7 08/04/2022 04:31 AM    MYELOPCT 0.9 08/03/2022 04:02 AM    BASOPCT 0.0 08/04/2022 04:31 AM    MONOSABS 0.08 08/04/2022 04:31 AM    LYMPHSABS 0.46 08/04/2022 04:31 AM    EOSABS 0.00 08/04/2022 04:31 AM    BASOSABS 0.00 08/04/2022 04:31 AM     CMP:    Lab Results   Component Value Date/Time     08/04/2022 06:45 AM    K 4.0 08/04/2022 06:45 AM    K 3.1 08/02/2022 06:25 AM     08/04/2022 06:45 AM    CO2 19 08/04/2022 06:45 AM    BUN 16 08/04/2022 06:45 AM    CREATININE 2.7 08/04/2022 06:45 AM    GFRAA 21 08/04/2022 06:45 AM    LABGLOM 21 08/04/2022 06:45 AM    GLUCOSE 145 08/04/2022 06:45 AM    GLUCOSE 46 04/02/2012 11:00 AM    PROT 5.1 08/02/2022 01:15 AM    LABALBU 2.8 08/02/2022 01:15 AM    LABALBU 3.8 04/02/2012 11:00 AM    CALCIUM 8.6 08/04/2022 06:45 AM    BILITOT 0.3 08/02/2022 01:15 AM    ALKPHOS 161 08/02/2022 01:15 AM    AST 65 08/02/2022 01:15 AM    ALT 62 08/02/2022 01:15 AM     XR ABDOMEN FOR NG/OG/NE TUBE PLACEMENT   Final Result   Catheter is in the right upper abdomen, probably stomach in conjunction with   patient positioning. XR CHEST PORTABLE   Final Result   Endotracheal tube and nasogastric tubes in satisfactory position. XR CHEST PORTABLE   Final Result   Presumed enteric tube with tip in the distal esophagus. Repositioning is   recommended. Enteric tube positioned as described. Bilateral lower lobe infiltrates and small bilateral pleural effusions. CT CHEST WO CONTRAST   Final Result   Extensive anasarca seen throughout the soft tissues the chest abdomen and   pelvis.   Irregularity identified at the site of the prior ruth catheter   along the right anterior chest wall with small radiopaque density seen in the   superior soft tissues suggesting possibly calcification or radiopaque foreign   body. Small bilateral pleural effusions with atelectatic changes seen at the lung   bases bilaterally or infiltrate. Chronic osteomyelitis involving L1 and L2 with hardware seen within the lower   lumbar spine. There is no evidence of acute intra-or pelvic abnormality. Extensive vascular calcifications seen throughout the thoracic and abdominal   aorta and mesenteric and iliac vessels. CT ABDOMEN PELVIS WO CONTRAST Additional Contrast? None   Final Result   Extensive anasarca seen throughout the soft tissues the chest abdomen and   pelvis. Irregularity identified at the site of the prior ruth catheter   along the right anterior chest wall with small radiopaque density seen in the   superior soft tissues suggesting possibly calcification or radiopaque foreign   body. Small bilateral pleural effusions with atelectatic changes seen at the lung   bases bilaterally or infiltrate. Chronic osteomyelitis involving L1 and L2 with hardware seen within the lower   lumbar spine. There is no evidence of acute intra-or pelvic abnormality. Extensive vascular calcifications seen throughout the thoracic and abdominal   aorta and mesenteric and iliac vessels. XR CHEST ABDOMEN NG PLACEMENT   Final Result   Satisfactory placement of NG tube as described. CT Head WO Contrast   Final Result   Small hypodensity right jerardo not demonstrated previously. Acute or subacute   lacunar infarct at this location cannot be excluded. This finding could be   more fully evaluated with MRI. XR CHEST PORTABLE   Final Result   1. Small patchy opacity within the left lung base which could represent   atelectasis or pneumonia. Atelectasis is favored. 2. The right lung is clear.          MRI BRAIN WO CONTRAST    (Results Pending)   XR CHEST PORTABLE    (Results Pending)   XR CHEST PORTABLE    (Results Pending)        All pertinent labs and images personally reviewed today    Assessment:     Acute metabolic encephalopathy and suspected R jerardo infarct of indeterminate age:   Patient presented after becoming slow to respond after dialysis with hypotension    Patient has been intermittently hypotensive and subsequent respiratory failure related to intubation on 8/3   CT head on arrival showed small hypodensity in the right jerardo previously not visualized   MRI brain is pending to rule out acute stroke    Respiratory failure   Now intubated    ESRD on HD MWF    Hyponatremia   As low as 118---> repeat 135    Spoke to pt's brother at bedside to update him and go over plan of care. Questions and concerns addressed.   Will plan to update family after testing completed     Plan:     Continue supportive care  Await MRI brain if able---screening pending  A1c and lipid panel ordered  Stroke education  [id number] [address] GOYO Erickson    8:35 AM  8/4/2022

## 2022-08-04 NOTE — PROGRESS NOTES
I saw and examined the patient in the ICU in the morning. I discussed the case in detail with resident staff, nursing and respiratory therapist as needed. I reviewed the pertinent laboratory studies, imaging studies, and records. Past medical history, surgical history, family history, and social history are unchanged unless stated in the history of present illness. I have reviewed the patient's medications and allergies. 58-year-old with history of ESRD on hemodialysis, CAD s/p stent, HFrEF, IDDM, hypertension, chronic lumbar CoNS osteomyelitis s/p vancomycin x 6 wks after dialysis, suppressive antibiotic therapy with doxycycline, BRCA s/p mastectomy, Uatsdin, sent in from dialysis center on 8/1 after completing her dialysis session for altered mental status. Admitted to medicine where 2 RRTs due to hypotension and patient was subsequently transferred to ICU. Work-up here with CT head shows right jerardo lacunar infarct. CTTAP:  Irregularity identified at the site of the prior ruth catheter along the right anterior chest wall with small radiopaque density seen in the  superior soft tissues suggesting possibly calcification or radiopaque foreign body. Extensive anasarca, Chronic osteomyelitis involving L1 and L2 with hardware seen within the lower  lumbar spine. Extensive vascular calcifications     Acute encephalopathy  CVA -unknown acuity. MRI brain pending  Sepsis / septic shock  Proteus mirabilis chest wall infection (site of port removal on left)  Lumbar osteomyelitis  Spinal cord stimulator present  Hypoglycemia / IDDM  Pancytopenia / Uatsdin     Patient's mental status continued to worsen and ABG showing worsening hypoxia. Intubated 8/3.     Continue mechanical ventilatory support  Sedation with fentanyl    Trend lactate  Started on Levophed to maintain MAP above 65  Solu-Cortef  Cultures pending  ID managing antibiotics, on suppressive doxycycline    Aspirin, Brilinta, statin. MRI brain vs rpt CTH pending, echocardiogram    Change femoral line  Tube feed  Prophylaxis with subcu heparin     Discussed with her brother Nani Mason at bedside     This patient is unstable and critically ill. There are life and organ supporting interventions that require frequent monitoring and personal assessment. There is a high possibility of sudden, clinically significant or life-threatening deterioration in this patient's condition which may require prompt therapeutic interventions. I spent 30 independent minutes of critical care time total throughout the day excluding procedures.      Ilda Heart MD MS  Pulmonary & [address] [place] [address]

## 2022-08-04 NOTE — PLAN OF CARE
Problem: Chronic Conditions and Co-morbidities  Goal: Patient's chronic conditions and co-morbidity symptoms are monitored and maintained or improved  Outcome: Progressing     Problem: Pain  Goal: Verbalizes/displays adequate comfort level or baseline comfort level  Outcome: Progressing     Problem: Safety - Adult  Goal: Free from fall injury  Outcome: Progressing  Flowsheets (Taken 8/4/2022 0800)  Free From Fall Injury: Instruct family/caregiver on patient safety     Problem: ABCDS Injury Assessment  Goal: Absence of physical injury  Outcome: Progressing  Flowsheets (Taken 8/4/2022 0800)  Absence of Physical Injury: Implement safety measures based on patient assessment     Problem: Skin/Tissue Integrity  Goal: Absence of new skin breakdown  Description: 1. Monitor for areas of redness and/or skin breakdown  2. Assess vascular access sites hourly  3. Every 4-6 hours minimum:  Change oxygen saturation probe site  4. Every 4-6 hours:  If on nasal continuous positive airway pressure, respiratory therapy assess nares and determine need for appliance change or resting period.   Outcome: Progressing     Problem: Respiratory - Adult  Goal: Achieves optimal ventilation and oxygenation  8/4/2022 1945 by Tammy Cohn RN  Outcome: Progressing

## 2022-08-04 NOTE — PROGRESS NOTES
Nephrology Progress Note  Patient's Name: Shin Hernández  12:32 PM  8/4/2022        Reason for Consult:  ESRD  Requesting Physician:  Thang Chacko DO    Chief Complaint:  hypotension, altered mental status  History Obtained From:  EHR    History of Present Ilness:    Shin Hernández is a 78 y.o. female with a history of ESRD HD dependent, vertebral osteomyelitis ( s/p antibiotic treatment ) on suppressive antibiotic therapy with doxycycline, CAD, right breast cancer( s/p chemotx ; s/p mastectomy ) ) and several other medical problems listed below. Patient was observed to be slow to response following her hemodialysis treatment at the facility. She was seen in the ED for evaluation. No history of a fall. Initial evaluation in the ED revealed patient have a blood pressure 134/87, pulse of 56 and a temperature of 97.9. She was noted to have intermittent hypotensive episodes. Laboratory data showed WBC 2.5, hemoglobin 8.1, platelet count 38,652. CHEM profile was consistent with her ESRD status. Patient was admitted to telemetry for continuation of care. Patient developed marked hypotension last evening. RRT was called. Initial blood pressure noted to be 80/40. She was given 250 cc normal saline bolus. Subsequently transferred to MICU for further management.     Subjective    8/4: worsening mental status last pm , requiring intubation      Allergies:  Furosemide    Current Medications:    pantoprazole (PROTONIX) 40 mg in sodium chloride (PF) 10 mL injection, Daily  chlorhexidine (PERIDEX) 0.12 % solution 15 mL, BID  [START ON 8/5/2022] aspirin chewable tablet 81 mg, Daily  perflutren lipid microspheres (DEFINITY) injection 1.65 mg, ONCE PRN  glucose chewable tablet 16 g, PRN  dextrose bolus 10% 125 mL, PRN   Or  dextrose bolus 10% 250 mL, PRN  glucagon (rDNA) injection 1 mg, PRN  dextrose 10 % infusion, Continuous PRN  [Held by provider] midodrine (PROAMATINE) tablet 10 mg, TID WC  norepinephrine (LEVOPHED) 16 mg in dextrose 5% 250 mL infusion, Continuous  doxycycline hyclate (VIBRAMYCIN) capsule 100 mg, 2 times per day  gabapentin (NEURONTIN) capsule 100 mg, Nightly  hydrocortisone sodium succinate PF (SOLU-CORTEF) injection 100 mg, Q8H  fentaNYL 10 mcg/ml in 0.9%  ml infusion, Continuous  white petrolatum ointment, TID PRN  sodium chloride flush 0.9 % injection 5-40 mL, 2 times per day  sodium chloride flush 0.9 % injection 5-40 mL, PRN  0.9 % sodium chloride infusion, PRN  ondansetron (ZOFRAN-ODT) disintegrating tablet 4 mg, Q8H PRN   Or  ondansetron (ZOFRAN) injection 4 mg, Q6H PRN  polyethylene glycol (GLYCOLAX) packet 17 g, Daily PRN  acetaminophen (TYLENOL) tablet 650 mg, Q6H PRN   Or  acetaminophen (TYLENOL) suppository 650 mg, Q6H PRN  allopurinol (ZYLOPRIM) tablet 100 mg, Daily  atorvastatin (LIPITOR) tablet 80 mg, Nightly  brimonidine (ALPHAGAN) 0.2 % ophthalmic solution 1 drop, BID  [Held by provider] bumetanide (BUMEX) tablet 2 mg, Once per day on Sun Tue Thu  lidocaine-prilocaine (EMLA) cream 1 Dose, See Admin Instructions  latanoprost (XALATAN) 0.005 % ophthalmic solution 1 drop, Nightly  [Held by provider] oxyCODONE-acetaminophen (PERCOCET) 5-325 MG per tablet 2 tablet, Q8H PRN  [Held by provider] sertraline (ZOLOFT) tablet 25 mg, Daily  ticagrelor (BRILINTA) tablet 90 mg, BID  white petrolatum ointment, BID  heparin (porcine) injection 5,000 Units, Q8H      Review of Systems:   Review of systems not obtained due to patient factors. Physical exam:  Vitals:    08/04/22 1145   BP:    Pulse: 72   Resp:    Temp:    SpO2:           General: intubated, sedated  Eyes: PERRL. No sclera icterus. No conjunctival injection. ENT: No discharge. Pharynx clear. Neck: Trachea midline. Normal thyroid. Lungs: No accessory muscle use. few scattered rhonchi  Chest: R upper chest wall small wound, with scant purulent discharge  CV: Regular rate. Regular rhythm. No murmur or rub. .   Abd: Non-tender. Non-distended. No masses. No organmegaly. Normal bowel sounds. Skin: Warm and dry. No nodule on exposed extremities. No rash on exposed extremities. Ext: bilateral UE edema; RUE sleeve, +lymphedema; LUE AVFgood thrill and bruit  Neuro: lethargic, opens eyes to name, follows some simple commands      Data:   Labs:  Lab Results   Component Value Date     08/04/2022     (LL) 08/04/2022     08/03/2022    K 4.0 08/04/2022    K 3.6 08/04/2022    K 3.8 08/03/2022     08/04/2022    CO2 19 (L) 08/04/2022    CO2 20 (L) 08/04/2022    CO2 23 08/03/2022    CREATININE 2.7 (H) 08/04/2022    CREATININE 2.7 (H) 08/04/2022    CREATININE 2.4 (H) 08/03/2022    BUN 16 08/04/2022    BUN 16 08/04/2022    BUN 12 08/03/2022    GLUCOSE 145 (H) 08/04/2022    GLUCOSE 153 (H) 08/04/2022    GLUCOSE 85 08/03/2022    PHOS 2.5 08/04/2022    PHOS 3.1 08/03/2022    PHOS 3.0 08/02/2022    WBC 4.2 (L) 08/04/2022    WBC 2.8 (L) 08/03/2022    WBC 2.7 (L) 08/02/2022    HGB 8.4 (L) 08/04/2022    HGB 8.1 (L) 08/03/2022    HGB 7.6 (L) 08/02/2022    HCT 24.6 (L) 08/04/2022    HCT 25.2 (L) 08/03/2022    HCT 24.4 (L) 08/02/2022    MCV 83.4 08/04/2022     (L) 08/04/2022         Imaging:  CT Head WO Contrast    Result Date: 8/1/2022  EXAMINATION: CT OF THE HEAD WITHOUT CONTRAST  8/1/2022 1:09 pm TECHNIQUE: CT of the head was performed without the administration of intravenous contrast. Automated exposure control, iterative reconstruction, and/or weight based adjustment of the mA/kV was utilized to reduce the radiation dose to as low as reasonably achievable. COMPARISON: 07/31/2022 noncontrast head CT. HISTORY: ORDERING SYSTEM PROVIDED HISTORY: altered mental status TECHNOLOGIST PROVIDED HISTORY: Reason for exam:->altered mental status Has a \"code stroke\" or \"stroke alert\" been called? ->No Decision Support Exception - unselect if not a suspected or confirmed emergency medical condition->Emergency Medical Condition (MA) What for exam:->hyoptension What reading provider will be dictating this exam?->CRC FINDINGS: Single frontal view of the lower chest and upper abdomen demonstrate and NG tube in position with the tip approximately 12 cm below the GE junction in the body of the stomach. There are multiple EKG leads overlying the upper abdomen and there is evidence of previous laminectomy pedicle screw fixation of the lower lumbar spine. Satisfactory placement of NG tube as described. Assessment    Shock, ?septic vs hypovolemic; cultures no growth yet; dids not respond to fluid bolus  Requiring low dose pressors  Altered mental status due to Acute lacunar stroke  Acute resp failure  R upper chest wall wound  Pancytopenia , ? Malignancy related  Hypothermia, s?  Sepsis induced  ESRD, HD dependent MWF    Plan    Panculture  Zosyn stopped; continues on suppressive doxycycline  For SLED today  Neurology consult  ID following  For MRI of brain when able    Updated patients brother at bedside    D/W Resident and Dr Cristina Sykes MD  12:32 PM  8/4/2022

## 2022-08-04 NOTE — PROGRESS NOTES
Speech Language Pathology      NAME:  Yamilka Escobedo  :  1955  DATE: 2022  ROOM:  [facility]    Chart review indicating that Pt is now intubated. Will DC from SLP caseload. Please re-order Speech Pathology as warranted/ appropriate. Thank you.        Stroke, lacunar ([facility] [I63.81]  Acute CVA (cerebrovascular accident) ([facility] [I63.9]

## 2022-08-04 NOTE — FLOWSHEET NOTE
08/04/22 1730   Vital Signs   BP (!) 126/56   Temp 97.9 °F (36.6 °C)   Heart Rate (!) 108   Post-Hemodialysis Assessment   Post-Treatment Procedures Access bleeding time < 10 minutes   Machine Disinfection Process Acid/Vinegar Clean;Heat Disinfect; Exterior Machine Disinfection   Rinseback Volume (ml) 300 ml   Blood Volume Processed (Liters) 58.2 l/min   Dialyzer Clearance Lightly streaked   Duration of Treatment (minutes) 210 minutes   Hemodialysis Intake (ml) 500 ml   Hemodialysis Output (ml) 1100 ml   NET Removed (ml) 600   Tolerated Treatment Good   Patient Response to Treatment pt tolerated well. did require bp support   Edema Generalized;Right lower extremity; Left lower extremity   Edema Generalized +2   RUE Edema +2   LUE Edema +3   RLE Edema +2   LLE Edema +2   Time Off 1730

## 2022-08-04 NOTE — PROGRESS NOTES
[facility]   Progress note      Subjective: The patient is intubated and sedated   Brother at bedside, case discussed  Undergoing EEG on my evaluation      Objective:    BP (!) 102/41   Pulse 96   Temp 96.8 °F (36 °C) (Temporal)   Resp 14   Ht 5' 10\" (1.778 m)   Wt 180 lb (81.6 kg)   SpO2 99%   BMI 25.83 kg/m²     In: 3836.4 [I.V.:3643]  Out: 0   In: 3836.4   Out: 0   Intubated sedated  HEENT: AT/NC, MMM  Neck: FROM, supple    Lungs: Clear to auscultation  CV: RRR, no MRGs  Vasc: Radial pulses 2+ right-sided in place, purulent drainage  Abdomen: Soft, non-tender; no masses or HSM  Extremities: No peripheral edema or digital cyanosis  Skin: no rash, lesions or ulcers       Recent Labs     08/02/22  1848 08/03/22  0402 08/04/22  0431   WBC 2.7* 2.8* 4.2*   HGB 7.6* 8.1* 8.4*   HCT 24.4* 25.2* 24.6*   PLT 81* 91* 100*       Recent Labs     08/04/22  0431 08/04/22  0645 08/04/22  1140   * 135 132   K 3.6 4.0 4.8   CL 89* 103 101   CO2 20* 19* 18*   BUN 16 16 17   CREATININE 2.7* 2.7* 2.7*   CALCIUM 8.8 8.6 8.8       Assessment:    Principal Problem:    Stroke, lacunar ([facility])  Active Problems:    Acute CVA (cerebrovascular accident) ([facility]    CKD (chronic kidney disease) stage 3, GFR 30-59 ml/min ([facility])  Resolved Problems:    * No resolved hospital problems. *      Plan:    76-year-old female with complicated past medical history including but not limited to-ESRD, osteomyelitis, coronary artery disease, previous strokes, CHF presents to the ED with strokelike symptoms and is admitted to telemetry unit with      Sepsis/shock/respiratory failure?   Intubated yesterday evening secondary to worsening confusion and respiratory failure  Vent management per ICU team  Transferred to ICU setting 8-2-22 after rapid response team was called for hypotension  Pancultures pending  On suppressive Doxy therapy for osteomyelitis of spine-infectious disease following, input appreciated  Right-sided port has been removed, no purulence at the site noted-likely source of sepsis     Acute/subacute lacunar infarct right jerardo  -MRI brain without contrast pending  -Aspirin 81 mg Lipitor 40 mg daily  -Consult neurology -input appreciated  -Check lipid panel and hemoglobin A1c needs tight control of risk factors. -Monitor blood pressure-adjust medications as needed.  -Discussed risk factor modification.  -Swallow study - failed.   ?Peg placement-repeat swallow eval in a day or 2     ESRD  -Dialysis MWF  -Consult nephrology  -Monitor labs  -Discontinue IV fluids as patient is already markedly volume overloaded     Mild elevation in ALT AST  Continue to monitor daily  No evidence of Daly sign right upper quadrant     DVT Prophylaxis   PT/OT  Discharge planning     Brother at bedside-Case discussed with him at length today      Aden Galeano MD  4:40 PM  8/4/2022

## 2022-08-04 NOTE — PROCEDURES
EEG Report  Wilian Graves is a 78 y.o. female      Appointment Date 8/4/2022   Appointment Time  11:00am   Facility Location [facility] EEG Number [id number]   Type of Study Portable Floor 4416     Technical Specifications  Technician [address] [facility] of consciousness unresponsive   Sleep deprived? no   Hyperventilation tested? no   Photic stim tested? no   EEG recording Standard 10-20 electrode placement    Duration of recording 25 mins   EEG complete? Yes         Clinical History  78 y.o. female  with h/o HTN, HLD, DM, CAD, CKD stage IV, osteomyelitis of left third finger, breast and bladder cancer, pancytopenia, and chronic diastolic CHF who was admitted to [facility] on 8/1/2022 with presentation of altered mental status with decreased responsiveness. Patient had been in dialysis time presentation when she was noted to have slowing of her responses compared to her baseline. She was able to complete her dialysis treatment with subsequent referral to ED for further evaluation. There was no report of any other associated symptoms or events. NIHSS on presentation to the ED was 3 with BP of 134/87. CT scan of the head was negative for any acute intracranial abnormalities on 7/31/2022. However study on 8/1/2022 showed a small hypodensity right jerardo suspicious for acute or subacute lacunar infarct. Patient w/o new neurologic deficits. She denied any chest pain, shortness of breath, fever, chills, headache, blurred vision, and abdominal pain. He was admitted for further evaluation. However, in the morning of 8/2/2022 patient had RRT called due to hypotension was treated with fluid bolus and midodrine. Second RRT initiated later in the pm for hypotension again. Patient was treated with midodrine and fluid bolus as before and she was subsequently transferred to the ICU for further management.     Medications    Current Facility-Administered Medications:     pantoprazole (PROTONIX) 40 mg in sodium chloride (PF) 10 mL injection, 40 mg, IntraVENous, Daily, Hoang Walker DO, 40 mg at 08/04/22 0849    chlorhexidine (PERIDEX) 0.12 % solution 15 mL, 15 mL, Mouth/Throat, BID, Diana Lopez DO, 15 mL at 08/04/22 0849    [START ON 8/5/2022] aspirin chewable tablet 81 mg, 81 mg, Oral, Daily, Mickey Rick MD    glucose chewable tablet 16 g, 4 tablet, Oral, PRN, Kang Campos MD    dextrose bolus 10% 125 mL, 125 mL, IntraVENous, PRN, Stopped at 08/03/22 1005 **OR** dextrose bolus 10% 250 mL, 250 mL, IntraVENous, PRN, Kang Campos MD    glucagon (rDNA) injection 1 mg, 1 mg, SubCUTAneous, PRN, Kang Campos MD    dextrose 10 % infusion, , IntraVENous, Continuous PRN, Kang Campos MD    [facility] by provider] midodrine (PROAMATINE) tablet 10 mg, 10 mg, Oral, TID WC, Kang Campos MD, 10 mg at 08/04/22 0849    norepinephrine (LEVOPHED) 16 mg in dextrose 5% 250 mL infusion, 1-100 mcg/min, IntraVENous, Continuous, Soo Freedman DO, Last Rate: 0.9 mL/hr at 08/04/22 1015, 1 mcg/min at 08/04/22 1015    doxycycline hyclate (VIBRAMYCIN) capsule 100 mg, 100 mg, Oral, 2 times per day, Mc Fernández MD, 100 mg at 08/04/22 0849    gabapentin (NEURONTIN) capsule 100 mg, 100 mg, Oral, Nightly, Mickey Rick MD    hydrocortisone sodium succinate PF (SOLU-CORTEF) injection 100 mg, 100 mg, IntraVENous, Q8H, Diana Lopez DO, 100 mg at 08/04/22 0904    fentaNYL 10 mcg/ml in 0.9%  ml infusion,  mcg/hr, IntraVENous, Continuous, Hoang Walker DO, Last Rate: 5 mL/hr at 08/04/22 0848, 50 mcg/hr at 08/04/22 0848    white petrolatum ointment, , Topical, TID PRN, Dionisio Westfall MD    sodium chloride flush 0.9 % injection 5-40 mL, 5-40 mL, IntraVENous, 2 times per day, Kang Campos MD, 10 mL at 08/03/22 2158    sodium chloride flush 0.9 % injection 5-40 mL, 5-40 mL, IntraVENous, PRN, Kang Campos MD    0.9 % sodium chloride infusion, , IntraVENous, PRN, Kang Campos MD    ondansetron No abnormal behavior or movements noted during the study. Activation procedures included photic stimulation and hyperventilation. Type of EEG:   Routine Inpatient EEG with video done at bedside    Description   Background: Disorganized background with prominent diffuse beta activity across all leads with more prevalent in bifrontal beta from muscle artifact. No discernible alpha activity. Mild diffuse delta slowing with periodic theta activity superimposed. Amplitudes were normal with symmetric EEG. No discernible alpha and background activity. No significant sharps, spike and wave activity, or abnormal sharp discharges present. Sleep: Observed    Photic stimulation: Not performed  Hyperventilation: Not performed    General Impression  Abnormal EEG with presence of diffuse slowing consistent with moderate to severe metabolic encephalopathy. No epileptiform discharges present. Clinical correlation is indicated.     Lola Roberts MD    [facility]

## 2022-08-04 NOTE — PLAN OF CARE
Problem: Chronic Conditions and Co-morbidities  Goal: Patient's chronic conditions and co-morbidity symptoms are monitored and maintained or improved  8/3/2022 2306 by Idalia Isaac RN  Outcome: Progressing  8/3/2022 1959 by Emiliano Baxter RN  Outcome: Progressing     Problem: Discharge Planning  Goal: Discharge to home or other facility with appropriate resources  8/3/2022 2306 by Idalia Isaac RN  Outcome: Progressing  8/3/2022 1959 by Emiliano Baxter RN  Outcome: Progressing     Problem: Pain  Goal: Verbalizes/displays adequate comfort level or baseline comfort level  Outcome: Progressing     Problem: Safety - Adult  Goal: Free from fall injury  8/3/2022 2306 by Idalia Isaac RN  Outcome: Progressing  8/3/2022 1959 by Emiliano Baxter RN  Outcome: Progressing  Flowsheets (Taken 8/3/2022 0800)  Free From Fall Injury: Instruct family/caregiver on patient safety     Problem: ABCDS Injury Assessment  Goal: Absence of physical injury  8/3/2022 2306 by Idalia Isaac RN  Outcome: Progressing  8/3/2022 1959 by Emiliano Baxter RN  Outcome: Progressing  Flowsheets  Taken 8/3/2022 1600  Absence of Physical Injury: Implement safety measures based on patient assessment  Taken 8/3/2022 0800  Absence of Physical Injury: Implement safety measures based on patient assessment     Problem: Skin/Tissue Integrity  Goal: Absence of new skin breakdown  Description: 1. Monitor for areas of redness and/or skin breakdown  2. Assess vascular access sites hourly  3. Every 4-6 hours minimum:  Change oxygen saturation probe site  4. Every 4-6 hours:  If on nasal continuous positive airway pressure, respiratory therapy assess nares and determine need for appliance change or resting period.   8/3/2022 2306 by Idalia Isaac RN  Outcome: Progressing  8/3/2022 1959 by Emiliano Baxter RN  Outcome: Progressing     Problem: Cardiovascular - Adult  Goal: Maintains optimal cardiac output and hemodynamic stability  Outcome: Progressing Problem: Metabolic/Fluid and Electrolytes - Adult  Goal: Hemodynamic stability and optimal renal function maintained  Outcome: Progressing     Problem: Nutrition Deficit:  Goal: Optimize nutritional status  Outcome: Progressing  Flowsheets (Taken 8/3/2022 1338 by Patsy Egan, RD, LD)  Nutrient intake appropriate for improving, restoring, or maintaining nutritional needs: Recommend, monitor, and adjust tube feedings and TPN/PPN based on assessed needs     Problem: Respiratory - Adult  Goal: Achieves optimal ventilation and oxygenation  8/3/2022 2306 by Idalia Isaac RN  Outcome: Progressing  8/3/2022 2016 by Ever Conrad RCP  Outcome: Progressing

## 2022-08-04 NOTE — PROGRESS NOTES
[address] [place] [address] [facility]  SIDNEY  Progress Note      Chief Complaint   Patient presents with    Altered Mental Status     LKW unknown. From dialysis, originally from [facility]. Sent in from dialysis d/t slow responses. Had full treatment. BGL 88        SUBJECTIVE:  Patient is tolerating medications. No reported adverse drug reactions. No nausea, vomiting, diarrhea. Sedated with fentanyl  Intubated because of agitation and airway protection  Secretions are minimal  40% FiO2 afebrile   at bedside  EEG today      Review of systems:  As stated above in the chief complaint, otherwise negative.     Medications:  Scheduled Meds:   pantoprazole (PROTONIX) 40 mg injection  40 mg IntraVENous Daily    chlorhexidine  15 mL Mouth/Throat BID    [START ON 8/5/2022] aspirin  81 mg Oral Daily    [Held by provider] midodrine  10 mg Oral TID WC    doxycycline hyclate  100 mg Oral 2 times per day    gabapentin  100 mg Oral Nightly    hydrocortisone sodium succinate PF  100 mg IntraVENous Q8H    sodium chloride flush  5-40 mL IntraVENous 2 times per day    allopurinol  100 mg Oral Daily    atorvastatin  80 mg Oral Nightly    brimonidine  1 drop Right Eye BID    [Held by provider] bumetanide  2 mg Oral Once per day on Sun Tue Thu    lidocaine-prilocaine  1 Dose Topical See Admin Instructions    latanoprost  1 drop Both Eyes Nightly    [Held by provider] sertraline  25 mg Oral Daily    ticagrelor  90 mg Oral BID    white petrolatum   Topical BID    heparin (porcine)  5,000 Units SubCUTAneous Q8H     Continuous Infusions:   dextrose      norepinephrine 3 mcg/min (08/04/22 0935)    fentaNYL 50 mcg/hr (08/04/22 0848)    sodium chloride       PRN Meds:glucose, dextrose bolus **OR** dextrose bolus, glucagon (rDNA), dextrose, white petrolatum, sodium chloride flush, sodium chloride, ondansetron **OR** ondansetron, polyethylene glycol, acetaminophen **OR** acetaminophen, [Held by provider] oxyCODONE-acetaminophen    OBJECTIVE:  BP (!) 109/53   Pulse 73   Temp 97 °F (36.1 °C) (Axillary)   Resp 14   Ht 5' 10\" (1.778 m)   Wt 180 lb (81.6 kg)   SpO2 100%   BMI 25.83 kg/m²   Temp  Av °F (36.1 °C)  Min: 95.8 °F (35.4 °C)  Max: 98 °F (36.7 °C)  Constitutional: The patient is sedated and intubated  Skin: Warm and dry. No rashes were noted. HEENT: Round and reactive pupils. Moist mucous membranes. No ulcerations or thrush. Neck: Supple to movements. Chest: No use of accessory muscles to breathe. Symmetrical expansion. No wheezing, crackles or rhonchi. Cardiovascular: S1 and S2 are rhythmic and regular. No murmurs appreciated. Abdomen: Positive bowel sounds to auscultation. Benign to palpation. No masses felt. No hepatosplenomegaly. Genitourinary: Gant  Extremities: No clubbing, no cyanosis, bilateral upper and lower extremity edema.   Lines: peripheral  8/3/2022 right femoral art line  2022 left femoral triple-lumen catheter    Laboratory and Tests Review:  Lab Results   Component Value Date    WBC 4.2 (L) 2022    WBC 2.8 (L) 2022    WBC 2.7 (L) 2022    HGB 8.4 (L) 2022    HCT 24.6 (L) 2022    MCV 83.4 2022     (L) 2022     Lab Results   Component Value Date    NEUTROABS 3.65 2022    NEUTROABS 2.66 2022    NEUTROABS 2.59 2022     Lab Results   Component Value Date    CRPHS 0.7 2016    CRPHS 2.6 2016    CRPHS 7.6 (H) 2015     Lab Results   Component Value Date    ALT 62 (H) 2022    AST 65 (H) 2022    ALKPHOS 161 (H) 2022    BILITOT 0.3 2022     Lab Results   Component Value Date/Time     2022 06:45 AM    K 4.0 2022 06:45 AM    K 3.1 2022 06:25 AM     2022 06:45 AM    CO2 19 2022 06:45 AM    BUN 16 2022 06:45 AM    CREATININE 2.7 2022 06:45 AM    CREATININE 2.7 2022 04:31 AM    CREATININE 2.4 2022 04:02 AM GFRAA 21 08/04/2022 06:45 AM    LABGLOM 21 08/04/2022 06:45 AM    GLUCOSE 145 08/04/2022 06:45 AM    GLUCOSE 46 04/02/2012 11:00 AM    PROT 5.1 08/02/2022 01:15 AM    LABALBU 2.8 08/02/2022 01:15 AM    LABALBU 3.8 04/02/2012 11:00 AM    CALCIUM 8.6 08/04/2022 06:45 AM    BILITOT 0.3 08/02/2022 01:15 AM    ALKPHOS 161 08/02/2022 01:15 AM    AST 65 08/02/2022 01:15 AM    ALT 62 08/02/2022 01:15 AM     Lab Results   Component Value Date    CRP 1.4 (H) 07/11/2022    CRP 7.7 (H) 04/13/2022    CRP 0.7 (H) 01/20/2022     Lab Results   Component Value Date    SEDRATE 30 (H) 06/15/2022    SEDRATE 81 (H) 04/13/2022    SEDRATE 19 01/20/2022     Radiology:  Reviewed chest x-ray    Microbiology:   Lab Results   Component Value Date/Time    BC 24 Hours no growth 08/01/2022 12:33 PM    BC 5 Days no growth 07/11/2022 02:05 PM    BC 5 Days no growth 06/15/2022 09:03 PM    ORG Staphylococcus epidermidis 04/18/2022 11:54 AM    ORG Morganella morganii ssp morganii 12/20/2021 07:45 PM    ORG Escherichia coli 12/20/2021 07:45 PM     Lab Results   Component Value Date/Time    BLOODCULT2 24 Hours no growth 08/01/2022 12:43 PM    BLOODCULT2 5 Days no growth 07/11/2022 02:05 PM    BLOODCULT2 5 Days no growth 06/15/2022 09:46 PM    ORG Staphylococcus epidermidis 04/18/2022 11:54 AM    ORG Morganella morganii ssp morganii 12/20/2021 07:45 PM    ORG Escherichia coli 12/20/2021 07:45 PM     WOUND/ABSCESS   Date Value Ref Range Status   12/20/2021 Heavy growth  Final   12/20/2021 Light growth  Final   10/12/2014 Light growth  Final   10/12/2014   Final    Light growth  Second species  After multiple attempts on the [employer], the ID was not attainable. Unable to differentiate between the following species:  Staph warneri  Staph auricularis  Staph capitis  Plates will be held 10 days. Contact the laboratory, [phone], if  sendout is desired.        Smear, Respiratory   Date Value Ref Range Status   06/16/2022   Final    Few Polymorphonuclear leukocytes  Epithelial cells not seen  Moderate Gram positive cocci in pairs       No results found for: MPNEUMO, CLAMYDCU, LABLEGI, AFBCX, FUNGSM, LABFUNG  CULTURE, RESPIRATORY   Date Value Ref Range Status   06/16/2022 Oral Pharyngeal Cas reduced  Final     Culture Catheter Tip   Date Value Ref Range Status   06/27/2022 Growth not present  Final     No results found for: BFCS  Culture Surgical   Date Value Ref Range Status   04/18/2022 Growth not present  Final     Urine Culture, Routine   Date Value Ref Range Status   11/07/2017 Growth not present  Final   09/22/2014   Final    ,000 CFU/mL  Mixed cas isolated. Further workup and sensitivity testing  is not routinely indicated and will not be performed.   Mixed cas isolated includes:  Mixed gram positive organisms  Gram negative rods       MRSA Culture Only   Date Value Ref Range Status   07/11/2022 Methicillin resistant Staph aureus not isolated  Final   10/04/2014 Methicillin resistant Staph aureus not isolated  Final       ASSESSMENT:    New acute lacunar infarct  Treated for vertebral osteomyelitis and now on suppressive doxycycline  Rule out aspiration pneumonia-chest x-ray at this time is clear and the CT scan are more consistent with fluid in the lower bases  Neutropenia  Reviewed cultures [facility] as well as [facility]    PLAN:  Continue doxycycline suppressive  Stop Zosyn  Monday Wednesday Friday hemodialysis  Discussed with   Follow clinically  Check final cultures  Monitor labs    Kartik Lawrence MD  11:28 AM  8/4/2022

## 2022-08-05 ENCOUNTER — APPOINTMENT (OUTPATIENT)
Dept: CT IMAGING | Age: 67
DRG: 064 | End: 2022-08-05
Payer: COMMERCIAL

## 2022-08-05 ENCOUNTER — APPOINTMENT (OUTPATIENT)
Dept: GENERAL RADIOLOGY | Age: 67
DRG: 064 | End: 2022-08-05
Payer: COMMERCIAL

## 2022-08-05 LAB
AADO2: 73.2 MMHG
ANION GAP SERPL CALCULATED.3IONS-SCNC: 13 MMOL/L (ref 7–16)
ANION GAP SERPL CALCULATED.3IONS-SCNC: 16 MMOL/L (ref 7–16)
ANISOCYTOSIS: ABNORMAL
B.E.: -5.1 MMOL/L (ref -3–3)
BASOPHILS ABSOLUTE: 0 E9/L (ref 0–0.2)
BASOPHILS ABSOLUTE: 0.01 E9/L (ref 0–0.2)
BASOPHILS RELATIVE PERCENT: 0.1 % (ref 0–2)
BASOPHILS RELATIVE PERCENT: 0.1 % (ref 0–2)
BUN BLDV-MCNC: 18 MG/DL (ref 6–23)
BUN BLDV-MCNC: 24 MG/DL (ref 6–23)
BURR CELLS: ABNORMAL
CALCIUM SERPL-MCNC: 8.8 MG/DL (ref 8.6–10.2)
CALCIUM SERPL-MCNC: 9.1 MG/DL (ref 8.6–10.2)
CHLORIDE BLD-SCNC: 100 MMOL/L (ref 98–107)
CHLORIDE BLD-SCNC: 100 MMOL/L (ref 98–107)
CO2: 17 MMOL/L (ref 22–29)
CO2: 18 MMOL/L (ref 22–29)
COHB: 0.7 % (ref 0–1.5)
CREAT SERPL-MCNC: 2.6 MG/DL (ref 0.5–1)
CREAT SERPL-MCNC: 2.9 MG/DL (ref 0.5–1)
CRITICAL: ABNORMAL
DATE ANALYZED: ABNORMAL
DATE OF COLLECTION: ABNORMAL
EOSINOPHILS ABSOLUTE: 0 E9/L (ref 0.05–0.5)
EOSINOPHILS ABSOLUTE: 0 E9/L (ref 0.05–0.5)
EOSINOPHILS RELATIVE PERCENT: 0 % (ref 0–6)
EOSINOPHILS RELATIVE PERCENT: 0 % (ref 0–6)
FIO2: 40 %
GFR AFRICAN AMERICAN: 20
GFR AFRICAN AMERICAN: 22
GFR NON-AFRICAN AMERICAN: 20 ML/MIN/1.73
GFR NON-AFRICAN AMERICAN: 22 ML/MIN/1.73
GLUCOSE BLD-MCNC: 257 MG/DL (ref 74–99)
GLUCOSE BLD-MCNC: 330 MG/DL (ref 74–99)
HCO3: 18.3 MMOL/L (ref 22–26)
HCT VFR BLD CALC: 19.1 % (ref 34–48)
HCT VFR BLD CALC: 29.4 % (ref 34–48)
HEMOGLOBIN: 6.4 G/DL (ref 11.5–15.5)
HEMOGLOBIN: 9.8 G/DL (ref 11.5–15.5)
HHB: 0.8 % (ref 0–5)
HYPOCHROMIA: ABNORMAL
IMMATURE GRANULOCYTES #: 0.05 E9/L
IMMATURE GRANULOCYTES %: 0.5 % (ref 0–5)
LAB: ABNORMAL
LV EF: 65 %
LVEF MODALITY: NORMAL
LYMPHOCYTES ABSOLUTE: 0.17 E9/L (ref 1.5–4)
LYMPHOCYTES ABSOLUTE: 0.72 E9/L (ref 1.5–4)
LYMPHOCYTES RELATIVE PERCENT: 1.7 % (ref 20–42)
LYMPHOCYTES RELATIVE PERCENT: 7.7 % (ref 20–42)
Lab: ABNORMAL
MAGNESIUM: 1.9 MG/DL (ref 1.6–2.6)
MCH RBC QN AUTO: 27.5 PG (ref 26–35)
MCH RBC QN AUTO: 27.8 PG (ref 26–35)
MCHC RBC AUTO-ENTMCNC: 33.3 % (ref 32–34.5)
MCHC RBC AUTO-ENTMCNC: 33.5 % (ref 32–34.5)
MCV RBC AUTO: 82.4 FL (ref 80–99.9)
MCV RBC AUTO: 83 FL (ref 80–99.9)
METER GLUCOSE: 120 MG/DL (ref 74–99)
METER GLUCOSE: 158 MG/DL (ref 74–99)
METER GLUCOSE: 273 MG/DL (ref 74–99)
METHB: 0.3 % (ref 0–1.5)
MODE: AC
MONOCYTES ABSOLUTE: 0.34 E9/L (ref 0.1–0.95)
MONOCYTES ABSOLUTE: 0.53 E9/L (ref 0.1–0.95)
MONOCYTES RELATIVE PERCENT: 4.4 % (ref 2–12)
MONOCYTES RELATIVE PERCENT: 5.7 % (ref 2–12)
MRSA CULTURE ONLY: NORMAL
MYELOCYTE PERCENT: 0.9 % (ref 0–0)
NEUTROPHILS ABSOLUTE: 7.9 E9/L (ref 1.8–7.3)
NEUTROPHILS ABSOLUTE: 8 E9/L (ref 1.8–7.3)
NEUTROPHILS RELATIVE PERCENT: 86 % (ref 43–80)
NEUTROPHILS RELATIVE PERCENT: 93 % (ref 43–80)
NUCLEATED RED BLOOD CELLS: 34.8 /100 WBC
O2 SATURATION: 99.2 % (ref 92–98.5)
O2HB: 98.2 % (ref 94–97)
OPERATOR ID: 2593
ORGANISM: ABNORMAL
ORGANISM: ABNORMAL
OVALOCYTES: ABNORMAL
PAPPENHEIMER BODIES: ABNORMAL
PATIENT TEMP: 37 C
PCO2: 28.6 MMHG (ref 35–45)
PDW BLD-RTO: 17.5 FL (ref 11.5–15)
PDW BLD-RTO: 17.8 FL (ref 11.5–15)
PEEP/CPAP: 8 CMH2O
PFO2: 4.23 MMHG/%
PH BLOOD GAS: 7.42 (ref 7.35–7.45)
PHOSPHORUS: 3.5 MG/DL (ref 2.5–4.5)
PLATELET # BLD: 106 E9/L (ref 130–450)
PLATELET # BLD: 148 E9/L (ref 130–450)
PMV BLD AUTO: ABNORMAL FL (ref 7–12)
PMV BLD AUTO: ABNORMAL FL (ref 7–12)
PO2: 169.1 MMHG (ref 75–100)
POIKILOCYTES: ABNORMAL
POLYCHROMASIA: ABNORMAL
POTASSIUM SERPL-SCNC: 4.5 MMOL/L (ref 3.5–5)
POTASSIUM SERPL-SCNC: 4.9 MMOL/L (ref 3.5–5)
RBC # BLD: 2.3 E12/L (ref 3.5–5.5)
RBC # BLD: 3.57 E12/L (ref 3.5–5.5)
REASON FOR REJECTION: NORMAL
REJECTED TEST: NORMAL
RI(T): 0.43
RR MECHANICAL: 14 B/MIN
SCHISTOCYTES: ABNORMAL
SODIUM BLD-SCNC: 131 MMOL/L (ref 132–146)
SODIUM BLD-SCNC: 133 MMOL/L (ref 132–146)
SOURCE, BLOOD GAS: ABNORMAL
TARGET CELLS: ABNORMAL
THB: 10.9 G/DL (ref 11.5–16.5)
TIME ANALYZED: 439
VT MECHANICAL: 380 ML
WBC # BLD: 8.4 E9/L (ref 4.5–11.5)
WBC # BLD: 9.3 E9/L (ref 4.5–11.5)
WOUND/ABSCESS: ABNORMAL
WOUND/ABSCESS: ABNORMAL

## 2022-08-05 PROCEDURE — 6370000000 HC RX 637 (ALT 250 FOR IP): Performed by: FAMILY MEDICINE

## 2022-08-05 PROCEDURE — 2500000003 HC RX 250 WO HCPCS: Performed by: INTERNAL MEDICINE

## 2022-08-05 PROCEDURE — 99233 SBSQ HOSP IP/OBS HIGH 50: CPT | Performed by: INTERNAL MEDICINE

## 2022-08-05 PROCEDURE — 82805 BLOOD GASES W/O2 SATURATION: CPT

## 2022-08-05 PROCEDURE — 2580000003 HC RX 258: Performed by: INTERNAL MEDICINE

## 2022-08-05 PROCEDURE — 99222 1ST HOSP IP/OBS MODERATE 55: CPT | Performed by: SURGERY

## 2022-08-05 PROCEDURE — 76937 US GUIDE VASCULAR ACCESS: CPT | Performed by: INTERNAL MEDICINE

## 2022-08-05 PROCEDURE — C9113 INJ PANTOPRAZOLE SODIUM, VIA: HCPCS | Performed by: INTERNAL MEDICINE

## 2022-08-05 PROCEDURE — 99233 SBSQ HOSP IP/OBS HIGH 50: CPT | Performed by: NURSE PRACTITIONER

## 2022-08-05 PROCEDURE — 85014 HEMATOCRIT: CPT

## 2022-08-05 PROCEDURE — 83735 ASSAY OF MAGNESIUM: CPT

## 2022-08-05 PROCEDURE — 85025 COMPLETE CBC W/AUTO DIFF WBC: CPT

## 2022-08-05 PROCEDURE — 94003 VENT MGMT INPAT SUBQ DAY: CPT

## 2022-08-05 PROCEDURE — 71250 CT THORAX DX C-: CPT

## 2022-08-05 PROCEDURE — 05HY33Z INSERTION OF INFUSION DEVICE INTO UPPER VEIN, PERCUTANEOUS APPROACH: ICD-10-PCS | Performed by: INTERNAL MEDICINE

## 2022-08-05 PROCEDURE — 6370000000 HC RX 637 (ALT 250 FOR IP): Performed by: INTERNAL MEDICINE

## 2022-08-05 PROCEDURE — 36415 COLL VENOUS BLD VENIPUNCTURE: CPT

## 2022-08-05 PROCEDURE — 84100 ASSAY OF PHOSPHORUS: CPT

## 2022-08-05 PROCEDURE — 6360000002 HC RX W HCPCS: Performed by: FAMILY MEDICINE

## 2022-08-05 PROCEDURE — 93306 TTE W/DOPPLER COMPLETE: CPT

## 2022-08-05 PROCEDURE — 82962 GLUCOSE BLOOD TEST: CPT

## 2022-08-05 PROCEDURE — 85018 HEMOGLOBIN: CPT

## 2022-08-05 PROCEDURE — 36556 INSERT NON-TUNNEL CV CATH: CPT | Performed by: INTERNAL MEDICINE

## 2022-08-05 PROCEDURE — 6360000002 HC RX W HCPCS: Performed by: INTERNAL MEDICINE

## 2022-08-05 PROCEDURE — 71045 X-RAY EXAM CHEST 1 VIEW: CPT

## 2022-08-05 PROCEDURE — P9045 ALBUMIN (HUMAN), 5%, 250 ML: HCPCS | Performed by: INTERNAL MEDICINE

## 2022-08-05 PROCEDURE — 2500000003 HC RX 250 WO HCPCS

## 2022-08-05 PROCEDURE — 2000000000 HC ICU R&B

## 2022-08-05 PROCEDURE — 80048 BASIC METABOLIC PNL TOTAL CA: CPT

## 2022-08-05 PROCEDURE — 6370000000 HC RX 637 (ALT 250 FOR IP): Performed by: SPECIALIST

## 2022-08-05 PROCEDURE — 37799 UNLISTED PX VASCULAR SURGERY: CPT

## 2022-08-05 RX ORDER — ALBUMIN, HUMAN INJ 5% 5 %
25 SOLUTION INTRAVENOUS ONCE
Status: COMPLETED | OUTPATIENT
Start: 2022-08-05 | End: 2022-08-05

## 2022-08-05 RX ORDER — THIAMINE HYDROCHLORIDE 100 MG/ML
250 INJECTION, SOLUTION INTRAMUSCULAR; INTRAVENOUS DAILY
Status: DISCONTINUED | OUTPATIENT
Start: 2022-08-05 | End: 2022-08-10 | Stop reason: SDUPTHER

## 2022-08-05 RX ORDER — ZINC SULFATE 50(220)MG
50 CAPSULE ORAL DAILY
Status: DISCONTINUED | OUTPATIENT
Start: 2022-08-05 | End: 2022-08-11 | Stop reason: HOSPADM

## 2022-08-05 RX ORDER — ASCORBIC ACID 500 MG
500 TABLET ORAL DAILY
Status: DISCONTINUED | OUTPATIENT
Start: 2022-08-05 | End: 2022-08-11 | Stop reason: HOSPADM

## 2022-08-05 RX ORDER — FLUCONAZOLE 2 MG/ML
400 INJECTION, SOLUTION INTRAVENOUS ONCE
Status: COMPLETED | OUTPATIENT
Start: 2022-08-05 | End: 2022-08-05

## 2022-08-05 RX ADMIN — HEPARIN SODIUM 5000 UNITS: 10000 INJECTION INTRAVENOUS; SUBCUTANEOUS at 13:00

## 2022-08-05 RX ADMIN — SODIUM CHLORIDE, PRESERVATIVE FREE 40 MG: 5 INJECTION INTRAVENOUS at 09:39

## 2022-08-05 RX ADMIN — LATANOPROST 1 DROP: 50 SOLUTION OPHTHALMIC at 20:34

## 2022-08-05 RX ADMIN — SODIUM CHLORIDE, PRESERVATIVE FREE 10 ML: 5 INJECTION INTRAVENOUS at 20:34

## 2022-08-05 RX ADMIN — TICAGRELOR 90 MG: 90 TABLET ORAL at 09:39

## 2022-08-05 RX ADMIN — BRIMONIDINE TARTRATE 1 DROP: 2 SOLUTION OPHTHALMIC at 20:34

## 2022-08-05 RX ADMIN — DOXYCYCLINE HYCLATE 100 MG: 100 CAPSULE ORAL at 20:34

## 2022-08-05 RX ADMIN — Medication 25 MCG/MIN: at 01:58

## 2022-08-05 RX ADMIN — Medication 50 MG: at 12:53

## 2022-08-05 RX ADMIN — BRIMONIDINE TARTRATE 1 DROP: 2 SOLUTION OPHTHALMIC at 09:40

## 2022-08-05 RX ADMIN — HYDROCORTISONE SODIUM SUCCINATE 100 MG: 100 INJECTION, POWDER, FOR SOLUTION INTRAMUSCULAR; INTRAVENOUS at 01:45

## 2022-08-05 RX ADMIN — ALBUMIN (HUMAN) 25 G: 12.5 INJECTION, SOLUTION INTRAVENOUS at 11:55

## 2022-08-05 RX ADMIN — GABAPENTIN 100 MG: 100 CAPSULE ORAL at 20:34

## 2022-08-05 RX ADMIN — HEPARIN SODIUM 5000 UNITS: 10000 INJECTION INTRAVENOUS; SUBCUTANEOUS at 20:34

## 2022-08-05 RX ADMIN — VASOPRESSIN 0.03 UNITS/MIN: 20 INJECTION INTRAVENOUS at 11:49

## 2022-08-05 RX ADMIN — FLUCONAZOLE IN SODIUM CHLORIDE 400 MG: 2 INJECTION, SOLUTION INTRAVENOUS at 17:27

## 2022-08-05 RX ADMIN — ATORVASTATIN CALCIUM 80 MG: 40 TABLET, FILM COATED ORAL at 20:34

## 2022-08-05 RX ADMIN — OXYCODONE HYDROCHLORIDE AND ACETAMINOPHEN 500 MG: 500 TABLET ORAL at 12:53

## 2022-08-05 RX ADMIN — Medication 50 MCG/HR: at 08:59

## 2022-08-05 RX ADMIN — ASPIRIN 81 MG CHEWABLE TABLET 81 MG: 81 TABLET CHEWABLE at 09:39

## 2022-08-05 RX ADMIN — PETROLATUM: 420 OINTMENT TOPICAL at 09:40

## 2022-08-05 RX ADMIN — THIAMINE HYDROCHLORIDE 250 MG: 100 INJECTION, SOLUTION INTRAMUSCULAR; INTRAVENOUS at 12:53

## 2022-08-05 RX ADMIN — TICAGRELOR 90 MG: 90 TABLET ORAL at 20:46

## 2022-08-05 RX ADMIN — PETROLATUM: 420 OINTMENT TOPICAL at 20:34

## 2022-08-05 RX ADMIN — 0.12% CHLORHEXIDINE GLUCONATE 15 ML: 1.2 RINSE ORAL at 09:40

## 2022-08-05 RX ADMIN — VANCOMYCIN HYDROCHLORIDE 1250 MG: 10 INJECTION, POWDER, LYOPHILIZED, FOR SOLUTION INTRAVENOUS at 15:55

## 2022-08-05 RX ADMIN — ALLOPURINOL 100 MG: 100 TABLET ORAL at 09:39

## 2022-08-05 RX ADMIN — 0.12% CHLORHEXIDINE GLUCONATE 15 ML: 1.2 RINSE ORAL at 20:34

## 2022-08-05 RX ADMIN — HYDROCORTISONE SODIUM SUCCINATE 100 MG: 100 INJECTION, POWDER, FOR SOLUTION INTRAMUSCULAR; INTRAVENOUS at 18:54

## 2022-08-05 RX ADMIN — HYDROCORTISONE SODIUM SUCCINATE 100 MG: 100 INJECTION, POWDER, FOR SOLUTION INTRAMUSCULAR; INTRAVENOUS at 09:39

## 2022-08-05 RX ADMIN — MEROPENEM 1000 MG: 1 INJECTION, POWDER, FOR SOLUTION INTRAVENOUS at 18:52

## 2022-08-05 RX ADMIN — HEPARIN SODIUM 5000 UNITS: 10000 INJECTION INTRAVENOUS; SUBCUTANEOUS at 04:43

## 2022-08-05 RX ADMIN — VANCOMYCIN HYDROCHLORIDE 750 MG: 10 INJECTION, POWDER, LYOPHILIZED, FOR SOLUTION INTRAVENOUS at 23:35

## 2022-08-05 RX ADMIN — DOXYCYCLINE HYCLATE 100 MG: 100 CAPSULE ORAL at 09:39

## 2022-08-05 RX ADMIN — Medication 10 MCG/MIN: at 17:25

## 2022-08-05 ASSESSMENT — PAIN SCALES - WONG BAKER

## 2022-08-05 ASSESSMENT — PULMONARY FUNCTION TESTS
PIF_VALUE: 20
PIF_VALUE: 21
PIF_VALUE: 20
PIF_VALUE: 20
PIF_VALUE: 21
PIF_VALUE: 22
PIF_VALUE: 20
PIF_VALUE: 21
PIF_VALUE: 23
PIF_VALUE: 20
PIF_VALUE: 21
PIF_VALUE: 20
PIF_VALUE: 21
PIF_VALUE: 22
PIF_VALUE: 24
PIF_VALUE: 21
PIF_VALUE: 21
PIF_VALUE: 22
PIF_VALUE: 24
PIF_VALUE: 21
PIF_VALUE: 22

## 2022-08-05 ASSESSMENT — PAIN SCALES - GENERAL
PAINLEVEL_OUTOF10: 0

## 2022-08-05 NOTE — PLAN OF CARE
Problem: Respiratory - Adult  Goal: Achieves optimal ventilation and oxygenation  8/5/2022 1609 by Annelise Zamora RCP  Outcome: Progressing

## 2022-08-05 NOTE — PLAN OF CARE
Problem: Respiratory - Adult  Goal: Achieves optimal ventilation and oxygenation  8/5/2022 1051 by Socorro Roach RCP  Outcome: Progressing

## 2022-08-05 NOTE — CONSULTS
with amputation    Patient is Orthodox 11/7/2017    Refusal of blood product     patient states she dose not take blood transfusion    Ventricular hypertrophy     Ventricular tachycardia ([facility] [address] 5/24/2021    Vitreous hemorrhage ([facility] [address]     left eye       Past Surgical History:   Procedure Laterality Date    AMPUTATION      right great toe    ANKLE SURGERY      correction on charcot joint of right ankle    BONE BIOPSY N/A 04/18/2022    BONE BIOPSY PERCUTANEOUS L1/L2 performed by Arsh Bui MD [facility] [address] [facility]  12/09/2021    Dr Pushpa Herrera Left 03/17/2020    LEFT CARPAL TUNNEL RELEASE performed by Cesia Yarbrough MD at [address]      bilateral    CHOLECYSTECTOMY      COLONOSCOPY      CORONARY ANGIOPLASTY  05/05/2022    Dr. Brandi Woo - RCA angioplasty    CYSTOSCOPY      DIALYSIS FISTULA CREATION Left 01/31/2018    upper arm/Dr. Kristen Rodríguez    ECHO COMPL W DOP COLOR FLOW  02/14/2013         ECHO COMPLETE  09/17/2013         FINGER AMPUTATION Left 01/20/2022    AMPUTATION OF LEFT THIRD DIGIT, DISTAL PHALANX performed by Zoila Cleaning MD at [facility]      right    OTHER SURGICAL HISTORY  09/27/2011    PPV, membranectomy, laser Right eye    OTHER SURGICAL HISTORY  insertion lumbar drain insertion    10/12/`14    OTHER SURGICAL HISTORY  10/22/2015    percutaneous lead placement for spinal cord stimulator    OTHER SURGICAL HISTORY  11/03/2015    Spinal; cord stimulator- turned off as of 3-10-20     PORT SURGERY N/A 6/27/2022    PORT REMOVAL performed by Alek Guerrier MD at [address] [facility] [address] BRET BOSTON,UP ARM BASILIC VEIN TRANSPOSIT Left 05/15/2018    TRANSPOSITION STAGE II AV FISTULA - LEFT UPPER ARM performed by Susanne Farrar MD at [address] [facility] ANGIOACCESS AV FISTULA Left 09/25/2018    SUPERFICIALIZATION AV FISTULA - LEFT ARM performed by Susanne Farrar MD at [address] [facility] W/VITRECTOMY ANY METH Left 04/10/2018 PARS PLANA VITRECTOMY 25 GAUGE RETINAL DETACHMENT REPAIR air fluid exchange, endolaser performed by Alejandra Hensley MD at [address]      L2    TRANSESOPHAGEAL ECHOCARDIOGRAM  11/11/2021    Dr. Leon Kayser    TRANSESOPHAGEAL ECHOCARDIOGRAM  04/19/2022        TUNNELED VENOUS CATHETER PLACEMENT  11/15/2017    VITRECTOMY Left 04/10/2018    PARS PLANA VITRECTOMY; RETINAL DETACHMENT REPAIR; GAS BUBBLE; LASER LEFT EYE       Medications Prior to Admission:    Prior to Admission medications    Medication Sig Start Date End Date Taking? Authorizing Provider   atorvastatin (LIPITOR) 80 MG tablet Take 80 mg by mouth at bedtime   Yes Historical Provider, MD   bisacodyl (DULCOLAX) 10 MG suppository Place 10 mg rectally daily as needed for Constipation   Yes Historical Provider, MD   gabapentin (NEURONTIN) 100 MG capsule Take 100 mg by mouth in the morning and 100 mg at noon and 100 mg before bedtime. Yes Historical Provider, MD   magnesium hydroxide (MILK OF MAGNESIA) 400 MG/5ML suspension Take 30 mLs by mouth daily as needed for Constipation   Yes Historical Provider, MD   vancomycin (VANCOCIN) 1000 MG/200ML SOLN Infuse 1,000 mg intravenously every 72 hours   Yes Historical Provider, MD   ondansetron (ZOFRAN) 4 MG tablet Take 4 mg by mouth every 6 hours as needed for Nausea or Vomiting   Yes Historical Provider, MD   sertraline (ZOLOFT) 25 MG tablet Take 25 mg by mouth in the morning. 7/26/22 8/2/22 Yes Historical Provider, MD   polyethylene glycol (GLYCOLAX) 17 g packet Take 17 g by mouth in the morning. 7/22/22 8/21/22  Alonso Sanz MD   senna (SENOKOT) 8.6 MG tablet Take 1 tablet by mouth nightly as needed for Constipation 7/21/22 8/20/22  Alonso Sanz MD   brimonidine (ALPHAGAN) 0.2 % ophthalmic solution Place 1 drop into the right eye in the morning and 1 drop before bedtime.  7/21/22   Alonso Sanz MD   midodrine (PROAMATINE) 10 MG tablet Take 1 tablet by mouth in the morning and 1 tablet at noon and 1 tablet in the evening. Take with meals. 7/21/22   Gerry Cantor MD   acetaminophen (TYLENOL) 325 MG tablet Take 650 mg by mouth every 4 hours as needed for Pain    Historical Provider, MD   Acidophilus Lactobacillus CAPS Take 1 capsule by mouth every morning    Historical Provider, MD   aspirin 81 MG EC tablet Take 81 mg by mouth every morning    Historical Provider, MD   meclizine (ANTIVERT) 12.5 MG tablet Take 12.5 mg by mouth every 8 hours as needed for Dizziness    Historical Provider, MD   mirtazapine (REMERON) 7.5 MG tablet Take 7.5 mg by mouth nightly    Historical Provider, MD   oxyCODONE-acetaminophen (PERCOCET) 5-325 MG per tablet Take 2 tablets by mouth every 8 hours as needed for Pain.     Historical Provider, MD   ZINC OXIDE, TOPICAL, ([address] [place] [address]) 10 % CREA Apply 1 Dose topically 2 times daily Apply to buttocks    Historical Provider, MD   bumetanide (BUMEX) 2 MG tablet Take 2 mg by mouth See Admin Instructions Given Sunday,Tuesday,Thursday,    Historical Provider, MD   lidocaine-prilocaine (EMLA) 2.5-2.5 % cream Apply 1 Dose topically See Admin Instructions Given Maryann Medico prior to Dialysis    Historical Provider, MD   metoprolol succinate (TOPROL XL) 25 MG extended release tablet Take 1 tablet by mouth daily 11/24/21 7/21/22  Grey Montes De Oca MD   allopurinol (ZYLOPRIM) 100 MG tablet Take 1 tablet by mouth daily 9/7/21   Grey Montes De Oca MD   ticagrelor (BRILINTA) 90 MG TABS tablet Take 1 tablet by mouth 2 times daily 9/7/21   Grey Montes De Oca MD   lanthanum (FOSRENOL) 1000 MG chewable tablet Take 1,000 mg by mouth 3 times daily (with meals)  8/9/21 7/21/22  Historical Provider, MD   B Complex-C-Folic Acid (BONI CAPS) 1 MG CAPS Take 1 mg by mouth nightly     Historical Provider, MD   omeprazole (PRILOSEC) 20 MG delayed release capsule Take 20 mg by mouth daily as needed (gerd)    Historical Provider, MD MCHUGH 0.01 % SOLN ophthalmic drops Place 1 drop into the right eye nightly  6/9/20   Historical Provider, MD       Allergies   Allergen Reactions    Furosemide Swelling and Other (See Comments)     Patient states \"I swelled up like a pig. \" states her kidneys shut down. Patient tolerates Bumex. Family History   Problem Relation Age of Onset    Breast Cancer Mother 61    Hypertension Mother     Heart Disease Father     Prostate Cancer Father     Breast Cancer Maternal Grandmother 61       Social History     Tobacco Use    Smoking status: Never    Smokeless tobacco: Never   Vaping Use    Vaping Use: Never used   Substance Use Topics    Alcohol use: No    Drug use: No         Review of Systems   Unable to complete due to patient status       PHYSICAL EXAM:    Vitals:    08/05/22 1100   BP:    Pulse: 83   Resp:    Temp:    SpO2:        General Appearance:  intubated, sedated  Skin:  right chest open wound with no surrounding erythema, clean wound base with healthy granulation tissue. No evidence of abscess  Head/face:  NCAT  Eyes:  No gross abnormalities. Lungs:  mechanical ventilation   Heart:  Heart regular rate   Abdomen:  Soft, non-tender, nondistended   Extremities: Extremities warm to touch      LABS:    CBC  Recent Labs     08/05/22  0408   WBC 8.4   HGB 9.8*   HCT 29.4*        BMP  Recent Labs     08/05/22  0408      K 4.9      CO2 17*   BUN 18   CREATININE 2.6*   CALCIUM 9.1     Liver Function  No results for input(s): AMYLASE, LIPASE, BILITOT, BILIDIR, AST, ALT, ALKPHOS, PROT, LABALBU in the last 72 hours. No results for input(s): LACTATE in the last 72 hours. No results for input(s): INR, PTT in the last 72 hours.     Invalid input(s): PT    RADIOLOGY    CT Head WO Contrast    Result Date: 8/1/2022  EXAMINATION: CT OF THE HEAD WITHOUT CONTRAST  8/1/2022 1:09 pm TECHNIQUE: CT of the head was performed without the administration of intravenous contrast. Automated exposure control, iterative reconstruction, and/or weight based adjustment of the mA/kV was utilized to reduce the radiation dose to as low as reasonably achievable. COMPARISON: 07/31/2022 noncontrast head CT. HISTORY: ORDERING SYSTEM PROVIDED HISTORY: altered mental status TECHNOLOGIST PROVIDED HISTORY: Reason for exam:->altered mental status Has a \"code stroke\" or \"stroke alert\" been called? ->No Decision Support Exception - unselect if not a suspected or confirmed emergency medical condition->Emergency Medical Condition (MA) What reading provider will be dictating this exam?->CRC FINDINGS: BRAIN/VENTRICLES: The ventricles are normal size, position and contour. Cortical sulci and sylvian fissures are mildly prominent. There are no masses or mass effect. There are no parenchymal hemorrhages or extra-axial fluid collections. There is a small hypodense area in the right jerardo not demonstrated previously. The cerebellum is unremarkable. ORBITS: The visualized portion of the orbits demonstrate no acute abnormality. SINUSES: The visualized paranasal sinuses and mastoid air cells demonstrate no acute abnormality. SOFT TISSUES/SKULL:  No definite fractures. There is a small left periorbital soft tissue contusion and hematoma, stable to slightly improved. Small hypodensity right jerardo not demonstrated previously. Acute or subacute lacunar infarct at this location cannot be excluded. This finding could be more fully evaluated with MRI. XR CHEST PORTABLE    Result Date: 8/1/2022  EXAMINATION: ONE XRAY VIEW OF THE CHEST 8/1/2022 12:56 pm COMPARISON: Previous chest x-ray of 07/11/2022 and CT scan of 06/15/2022 HISTORY: ORDERING SYSTEM PROVIDED HISTORY: altered TECHNOLOGIST PROVIDED HISTORY: Reason for exam:->altered What reading provider will be dictating this exam?->CRC FINDINGS: The cardiac silhouette is within normal limits. The right lung is clear.   There is chronic elevation right hemidiaphragm There is a very small patchy opacity seen within the left lung base which could represent atelectasis or less likely pneumonia. The left upper lobe is clear. 1. Small patchy opacity within the left lung base which could represent atelectasis or pneumonia. Atelectasis is favored. 2. The right lung is clear. XR CHEST ABDOMEN NG PLACEMENT    Result Date: 8/2/2022  EXAMINATION: ONE SUPINE XRAY VIEW(S) OF THE ABDOMEN 8/2/2022 7:00 pm COMPARISON: None. HISTORY: ORDERING SYSTEM PROVIDED HISTORY: hyoptension TECHNOLOGIST PROVIDED HISTORY: Reason for exam:->hyoptension What reading provider will be dictating this exam?->CRC FINDINGS: Single frontal view of the lower chest and upper abdomen demonstrate and NG tube in position with the tip approximately 12 cm below the GE junction in the body of the stomach. There are multiple EKG leads overlying the upper abdomen and there is evidence of previous laminectomy pedicle screw fixation of the lower lumbar spine. Satisfactory placement of NG tube as described. ASSESSMENT:  78 y.o. female with AMS, respiratory failure with open healing wound from port removal 6/27.  No evidence of infection or abscess    PLAN:  - pack wound wet to dry daily  - no surgical intervention required, we will sign off    Discussed with Dr. Ngoc Osuna    Electronically signed by Sandra Sevilla MD on 8/5/22 at 1:24 PM EDT

## 2022-08-05 NOTE — PROGRESS NOTES
Pharmacy Consultation Note  (Antibiotic Dosing and Monitoring)    Initial consult date: 8/5/22  Consulting physician/provider: Dr. Boris Dwyer  Drug: Vancomycin  Indication: CLABSI    Age/  Gender Height Weight IBW  Allergy Information   67 y.o./female 5' 10\" (177.8 cm) 180 lb (81.6 kg)     Ideal body weight: 68.5 kg (151 lb 0.2 oz)   Furosemide      Renal Function:  Recent Labs     08/04/22  1140 08/04/22  1758 08/05/22  0408   BUN 17 12 18   CREATININE 2.7* 2.2* 2.6*       Intake/Output Summary (Last 24 hours) at 8/5/2022 1453  Last data filed at 8/5/2022 1136  Gross per 24 hour   Intake 1992.23 ml   Output 1100 ml   Net 892.23 ml       Vancomycin Monitoring:  Trough:  No results for input(s): VANCOTROUGH in the last 72 hours. Random:  No results for input(s): VANCORANDOM in the last 72 hours. Vancomycin Administration Times:  Recent vancomycin administrations                     vancomycin (VANCOCIN) 1,250 mg in dextrose 5 % 250 mL IVPB (mg) 1,250 mg New Bag 08/03/22 2007                    Assessment:  Patient is a 78 y.o. female who has been initiated on vancomycin  Estimated Creatinine Clearance: 22 mL/min (A) (based on SCr of 2.6 mg/dL (H)). 8/5: Patient with increasing pressor requirements this AM with addition of vasopressin. WBC 2.8 -> 4.2 -> 8.4 today with 93% neutrophils. Lines to be exchanged and FMS added today.      Plan:  Vancomycin 1250 mg IV x1, followed by 750 mg IV x1 tomorrow pending ID's plans for de-escalation  Will check vancomycin levels when appropriate  Will continue to monitor renal function   Clinical pharmacy to follow    Jeri Mccray, ThomasD, BCPS, BCCCP 8/5/2022 2:54 PM

## 2022-08-05 NOTE — PROGRESS NOTES
Neuro Inpatient Follow Up Note       Kimberley Benitez is a 78 y.o.  female     Neurology is following for AMS    PMH: ESRD on HD MWF, diabetes, hyperlipidemia, hypertension, chronic osteomyelitis    Patient presented to ED on 8/1 for altered mental status from [place] at [facility] known well was unknown. Patient presented from dialysis due to slow responses after having full treatment. Dialysis nurse noticed that patient was not at her baseline with a blood glucose of 88. In ED patient was alert and oriented x4 but slow to respond with no acute complaints. CT head concerning for lacunar infarct with NIH of 3 in the ED. Patient was rapid response on 8/2 around 1 AM for hypotension with a BP of 90/19 and MAP of 42. Patient was awake, oriented and following commands with no neurologic deficit found. Patient did mention she was very tired and repeat blood pressure was 76/39. Patient had not received her scheduled midodrine but had received dialysis that day. IVF bolus ordered patient's IV line infiltrated. Patient received her midodrine and repeat blood pressure was 80/42; Bumex has been held during RRT. Patient found to have worsening mentation and ABG showing worsening hypoxia patient was subsequently intubated and sedated on fentanyl. Patient has been maintained on Levophed    Patient was intubated on 8/3 around 5:40 PM; vital signs are stable at present time on ventilator. Repeat CT head on 8/4 shows stable right pontine infarct    ROS is limited due to intubation    Patient's sister-in-law is at bedside. Objective:     BP (!) 126/56   Pulse 94   Temp 98.3 °F (36.8 °C) (Axillary)   Resp 14   Ht 5' 10\" (1.778 m)   Wt 144 lb 10 oz (65.6 kg)   SpO2 100%   BMI 20.75 kg/m²     General appearance: intubated, keeps eyes closed today, appears stated age, and no distress  Head: normocephalic, without obvious abnormality, atraumatic  Eyes: conjunctivae/corneas clear.  .  Neck: supple, symmetrical, trachea midline and thyroid not enlarged  Lungs: unlabored breaths on vent   Heart: regular rate and rhythm  Extremities: normal, atraumatic, no cyanosis, +3 edema to BUE   Skin: color, texture, turgor normal---no rashes or lesions      Mental Status: Intubated, sedated, keeps eyes closed, no commands for me today     Speech/Language: intubated     Cranial Nerves: limited d/t patient's condition   I: smell NA   II: visual acuity  NA   II: visual fields    II: pupils Keeps as closed   III,VII: ptosis None   III,IV,VI: extraocular muscles     V: mastication    V: facial light touch sensation  Normal   V,VII: corneal reflex  Present   VII: facial muscle function - upper  Normal   VII: facial muscle function - lower Normal   VIII: hearing Blinks to claps and snaps, responds to voice   IX: soft palate elevation     IX,X: gag reflex Present   XI: trapezius strength     XI: sternocleidomastoid strength    XI: neck extension strength     XII: tongue strength       Motor:  No handgrips today  Minimal withdrawal to pain in all  Normal bulk and tone  No abnormal movements    Sensory:  Minimal response to pain     Coordination:   Unable to test at present     Gait:  Unable to test at present     DTR:   Right Brachioradialis reflex 1+  Left Brachioradialis reflex 1+  Right Biceps reflex 1+  Left Biceps reflex 1+  Right Triceps reflex 1+  Left Triceps reflex 1+  Right Quadriceps reflex 0  Left Quadriceps reflex 0  Right Achilles reflex 0  Left Achilles reflex 0    No Babinskis  No Hummel's    No pathological reflexes    Laboratory/Radiology:     CBC with Differential:    Lab Results   Component Value Date/Time    WBC 8.4 08/05/2022 04:08 AM    RBC 3.57 08/05/2022 04:08 AM    HGB 9.8 08/05/2022 04:08 AM    HCT 29.4 08/05/2022 04:08 AM     08/05/2022 04:08 AM    MCV 82.4 08/05/2022 04:08 AM    MCH 27.5 08/05/2022 04:08 AM    MCHC 33.3 08/05/2022 04:08 AM    RDW 17.8 08/05/2022 04:08 AM    NRBC 34.8 08/05/2022 04:08 AM    SEGSPCT 70 03/12/2014 10:53 AM    BANDSPCT 1 02/18/2015 10:35 AM    METASPCT 0.9 08/03/2022 04:02 AM    LYMPHOPCT 1.7 08/05/2022 04:08 AM    PROMYELOPCT 1.8 02/18/2017 04:00 AM    MONOPCT 4.4 08/05/2022 04:08 AM    MYELOPCT 0.9 08/05/2022 04:08 AM    BASOPCT 0.1 08/05/2022 04:08 AM    MONOSABS 0.34 08/05/2022 04:08 AM    LYMPHSABS 0.17 08/05/2022 04:08 AM    EOSABS 0.00 08/05/2022 04:08 AM    BASOSABS 0.00 08/05/2022 04:08 AM     CMP:    Lab Results   Component Value Date/Time     08/05/2022 04:08 AM    K 4.9 08/05/2022 04:08 AM    K 3.1 08/02/2022 06:25 AM     08/05/2022 04:08 AM    CO2 17 08/05/2022 04:08 AM    BUN 18 08/05/2022 04:08 AM    CREATININE 2.6 08/05/2022 04:08 AM    GFRAA 22 08/05/2022 04:08 AM    LABGLOM 22 08/05/2022 04:08 AM    GLUCOSE 257 08/05/2022 04:08 AM    GLUCOSE 46 04/02/2012 11:00 AM    PROT 5.1 08/02/2022 01:15 AM    LABALBU 2.8 08/02/2022 01:15 AM    LABALBU 3.8 04/02/2012 11:00 AM    CALCIUM 9.1 08/05/2022 04:08 AM    BILITOT 0.3 08/02/2022 01:15 AM    ALKPHOS 161 08/02/2022 01:15 AM    AST 65 08/02/2022 01:15 AM    ALT 62 08/02/2022 01:15 AM     XR CHEST PORTABLE   Final Result   1. There are no findings of failure or pneumonia. CT HEAD WO CONTRAST   Final Result   Stable small hypodensity in the right jerardo, which could represent infarct. However, this should be confirmed with MRI. No new acute intracranial process identified. XR CHEST PORTABLE   Final Result   No acute process         XR ABDOMEN FOR NG/OG/NE TUBE PLACEMENT   Final Result   Catheter is in the right upper abdomen, probably stomach in conjunction with   patient positioning. XR CHEST PORTABLE   Final Result   Endotracheal tube and nasogastric tubes in satisfactory position. XR CHEST PORTABLE   Final Result   Presumed enteric tube with tip in the distal esophagus. Repositioning is   recommended. Enteric tube positioned as described.       Bilateral lower lobe infiltrates and small bilateral pleural effusions. CT CHEST WO CONTRAST   Final Result   Extensive anasarca seen throughout the soft tissues the chest abdomen and   pelvis. Irregularity identified at the site of the prior ruth catheter   along the right anterior chest wall with small radiopaque density seen in the   superior soft tissues suggesting possibly calcification or radiopaque foreign   body. Small bilateral pleural effusions with atelectatic changes seen at the lung   bases bilaterally or infiltrate. Chronic osteomyelitis involving L1 and L2 with hardware seen within the lower   lumbar spine. There is no evidence of acute intra-or pelvic abnormality. Extensive vascular calcifications seen throughout the thoracic and abdominal   aorta and mesenteric and iliac vessels. CT ABDOMEN PELVIS WO CONTRAST Additional Contrast? None   Final Result   Extensive anasarca seen throughout the soft tissues the chest abdomen and   pelvis. Irregularity identified at the site of the prior ruth catheter   along the right anterior chest wall with small radiopaque density seen in the   superior soft tissues suggesting possibly calcification or radiopaque foreign   body. Small bilateral pleural effusions with atelectatic changes seen at the lung   bases bilaterally or infiltrate. Chronic osteomyelitis involving L1 and L2 with hardware seen within the lower   lumbar spine. There is no evidence of acute intra-or pelvic abnormality. Extensive vascular calcifications seen throughout the thoracic and abdominal   aorta and mesenteric and iliac vessels. XR CHEST ABDOMEN NG PLACEMENT   Final Result   Satisfactory placement of NG tube as described. CT Head WO Contrast   Final Result   Small hypodensity right jerardo not demonstrated previously. Acute or subacute   lacunar infarct at this location cannot be excluded. This finding could be   more fully evaluated with MRI. XR CHEST PORTABLE   Final Result   1. Small patchy opacity within the left lung base which could represent   atelectasis or pneumonia. Atelectasis is favored. 2. The right lung is clear. XR CHEST PORTABLE    (Results Pending)        All pertinent labs and images personally reviewed today    Assessment:     R pontine infarct of indeterminate age:    CT head on arrival showed small hypodensity in the right jerardo previously not visualized   MRI brain canceled as unable due to hardware   Repeat CT head from 8/4 stable; no hemorrhage   Await EEG report   There is been no witnessed seizure activity    Acute encephalopathy  Patient presented after becoming slow to respond after dialysis with hypotension    Patient has been intermittently hypotensive and subsequent respiratory failure related to intubation on 8/3    Respiratory failure   Remains intubated and on sedation    ESRD on HD MWF    Hyponatremia   Resolved    Spoke to pt's sister-in-law at bedside today. Updated her on repeat CT and plans moving forward. Will await EEG report. All questions and concerns addressed.       Plan:     Continue supportive care  Await EEG report  MRI brain canceled  Continue aspirin, Brilinta and high intensity statin  A1c 5.1, LDL 25  On statin at home   Stroke education  SCDs  Continue tele    Ed Russo, APRN - NP-C    10:00 AM  8/5/2022

## 2022-08-05 NOTE — PROGRESS NOTES
Upon entry to pt room and assessment pt is still at a RASS of -3, which would call for titration according to order of the fentanyl gtt, providers want pt on fentanyl gtt. So I spoke with dr Sarita Contreras to have the ordered titration goals changed d/t pt not easily being aroused at this time, continuing to have a RASS-3, spontaneous movements and intermittent pain response. Pt only opens eyes to pain as well at this time. Rass goal now corrected.  Will continue fent gtt at current rate per

## 2022-08-05 NOTE — PROGRESS NOTES
[facility]   Progress note      Subjective: The patient is intubated and sedated   Remains intubated      Objective:    BP (!) 163/52   Pulse 84   Temp 98.6 °F (37 °C) (Axillary)   Resp 14   Ht 5' 10\" (1.778 m)   Wt 144 lb 10 oz (65.6 kg)   SpO2 99%   BMI 20.75 kg/m²     In: 1992.2 [I.V.:1008.8; NG/GT:290]  Out: 1100   In: 1992.2   Out: 1100   Intubated sedated  HEENT: AT/NC, MMM  Neck: FROM, supple    Lungs: Clear to auscultation  CV: RRR, no MRGs  Vasc: Radial pulses 2+ right-sided in place, purulent drainage  Abdomen: Soft, non-tender; no masses or HSM  Extremities: No peripheral edema or digital cyanosis  Skin: no rash, lesions or ulcers       Recent Labs     08/03/22  0402 08/04/22  0431 08/05/22  0408   WBC 2.8* 4.2* 8.4   HGB 8.1* 8.4* 9.8*   HCT 25.2* 24.6* 29.4*   PLT 91* 100* 148       Recent Labs     08/04/22  1140 08/04/22  1758 08/05/22  0408    135 133   K 4.8 4.7 4.9    100 100   CO2 18* 18* 17*   BUN 17 12 18   CREATININE 2.7* 2.2* 2.6*   CALCIUM 8.8 8.8 9.1       Assessment:    Principal Problem:    Stroke, lacunar ([facility])  Active Problems:    Acute CVA (cerebrovascular accident) ([facility]    CKD (chronic kidney disease) stage 3, GFR 30-59 ml/min ([facility])  Resolved Problems:    * No resolved hospital problems. *      Plan:    71-year-old female with complicated past medical history including but not limited to-ESRD, osteomyelitis, coronary artery disease, previous strokes, CHF presents to the ED with strokelike symptoms and is admitted to telemetry unit with      Sepsis/shock/respiratory failure?   Intubated 8/2/2022 secondary to worsening confusion and respiratory failure  Vent management per ICU team  Transferred to ICU setting 8-2-22 after rapid response team was called for hypotension  Pancultures pending-negative to date  On suppressive Doxy therapy for osteomyelitis of spine-infectious disease following, input appreciated  Patient also has a large decubitus ulcer-Proteus mirabilis and Serratia  Right-sided port has been removed, no purulence at the site noted today  Vanc/Merrem/Diflucan pending pan culture results     Acute/subacute lacunar infarct right jerardo  -MRI brain without contrast pending  -Aspirin 81 mg Lipitor 40 mg daily  -Consult neurology -input appreciated  -Check lipid panel and hemoglobin A1c needs tight control of risk factors.   -Monitor blood pressure-adjust medications as needed.  -Echocardiogram with ejection fraction 65%, no PFO     ESRD  -Dialysis MWF  -Consult nephrology  -Monitor labs  -Discontinue IV fluids as patient is already markedly volume overloaded     Mild elevation in ALT AST  Continue to monitor daily  No evidence of Daly sign right upper quadrant     DVT Prophylaxis   PT/OT  Discharge planning     Case discussed with Dr. Breann Richardson disease and nursing at bedside      Army Kirit MD  5:17 PM  8/5/2022

## 2022-08-05 NOTE — PROGRESS NOTES
[facility]  Department of Internal Medicine   Internal Medicine Residency   MICU Progress Note    Patient:  Adonay Rocha 78 y.o. female  MRN: [id number]     Date of Service: 2022    Allergy: Furosemide    Subjective     Patient is sedated and intubated. Patient is unable to respond to questions. Intubation day 3.    24h change: No acute events overnight       Objective     VITAL SIGNS:  BP (!) 126/56   Pulse (!) 103   Temp 97.9 °F (36.6 °C)   Resp 14   Ht 5' 10\" (1.778 m)   Wt 180 lb (81.6 kg)   SpO2 99%   BMI 25.83 kg/m²   Tmax over 24 hours:  Temp (24hrs), Av °F (36.1 °C), Min:95.8 °F (35.4 °C), Max:98 °F (36.7 °C)      Patient Vitals for the past 6 hrs:   BP Temp Temp src Pulse Resp SpO2   22 2018 -- -- -- (!) 103 -- 99 %   22 1900 -- -- -- (!) 101 -- 100 %   22 1800 -- -- -- (!) 104 -- 100 %   22 1730 (!) 126/56 97.9 °F (36.6 °C) -- (!) 108 -- --   22 1720 -- -- -- (!) 106 -- --   22 1700 -- -- -- (!) 105 -- 99 %   22 1645 -- -- -- (!) 103 -- --   22 1630 -- -- -- 99 -- --   22 1621 -- -- -- 96 -- --   22 1615 -- -- -- 94 -- --   22 1600 (!) 102/41 97.9 °F (36.6 °C) Axillary 97 14 99 %   22 1545 -- -- -- 93 -- --   22 1530 -- -- -- 92 -- --   22 1525 -- -- -- 90 -- --         Intake/Output Summary (Last 24 hours) at 2022  Last data filed at 2022 1730  Gross per 24 hour   Intake 916.44 ml   Output 1100 ml   Net -183.56 ml     Wt Readings from Last 2 Encounters:   22 180 lb (81.6 kg)   22 200 lb (90.7 kg)     Body mass index is 25.83 kg/m². I & O - 24hr:   Intake/Output Summary (Last 24 hours) at 2022  Last data filed at 2022 1730  Gross per 24 hour   Intake 916.44 ml   Output 1100 ml   Net -183.56 ml       Physical Exam:  General Appearance: Patient is intubated.  RASS score -3  Neck: no adenopathy, no carotid bruit, supple, symmetrical, trachea midline, and thyroid not enlarged, symmetric, no tenderness/mass/nodules  Lung: clear to auscultation bilaterally  Heart: regular rate and rhythm, S1, S2 normal, no murmur, click, rub or gallop  Abdomen: soft, non-tender; bowel sounds normal; no masses,  no organomegaly  Extremities:  +1 bilateral edema   Musculoskeletal: No joint swelling, no muscle tenderness. Unable to assess ROM. Neurologic: Mental status: responds to pain by moving/nodding head   Skin: Right sided chest wound. Disc shaped wound about 2cm in diameter and depth. No erythema, swelling or discharge. Lines     site day    Art line   R Femoral 3   TLC L Fem 5   PICC None    Hemoaccess Left arm       VENT SETTINGS (Comprehensive) (if applicable):      Additional Respiratory Assessments  Heart Rate: (!) 103  Resp: 14  SpO2: 99 %  Position: Semi-Vidal's  Humidification Source: Heated wire  Humidification Temp: 36.8  Circuit Condensation: Drained  Airway Type: ET  Airway Size: 7.5  Cuff Pressure (cm H2O): 29 cm H2O    ABGs:   Recent Labs     08/04/22  1802   PH 7.413   PCO2 30.2*   PO2 154.5*   HCO3 18.8*   BE -4.8*   O2SAT 99.0*       Laboratory findings:  Complete Blood Count:   Recent Labs     08/02/22  1848 08/03/22  0402 08/04/22  0431   WBC 2.7* 2.8* 4.2*   HGB 7.6* 8.1* 8.4*   HCT 24.4* 25.2* 24.6*   PLT 81* 91* 100*        Last 3 Blood Glucose:   Recent Labs     08/04/22  0645 08/04/22  1140 08/04/22  1758   GLUCOSE 145* 143* 178*        PT/INR:    Lab Results   Component Value Date/Time    PROTIME 11.0 08/01/2022 12:43 PM    PROTIME 10.4 02/15/2012 10:40 AM    INR 1.0 08/01/2022 12:43 PM     PTT:    Lab Results   Component Value Date/Time    APTT 45.6 08/01/2022 12:43 PM       Comprehensive Metabolic Profile:   Recent Labs     08/02/22  0115 08/02/22  0625 08/04/22  0645 08/04/22  1140 08/04/22  1758      < > 135 132 135   K 3.2*   < > 4.0 4.8 4.7      < > 103 101 100   CO2 28   < > 19* 18* 18*   BUN 8   < > 16 17 12   CREATININE 2.0* However, this should be confirmed with MRI. No new acute intracranial process identified. CT Head WO Contrast    Result Date: 8/1/2022  Small hypodensity right jerardo not demonstrated previously. Acute or subacute lacunar infarct at this location cannot be excluded. This finding could be more fully evaluated with MRI. CT Head WO Contrast    Result Date: 7/31/2022  No acute intracranial abnormality. Small scalp hematoma overlying the left orbit     CT CHEST WO CONTRAST    Result Date: 8/3/2022  Extensive anasarca seen throughout the soft tissues the chest abdomen and pelvis. Irregularity identified at the site of the prior ruth catheter along the right anterior chest wall with small radiopaque density seen in the superior soft tissues suggesting possibly calcification or radiopaque foreign body. Small bilateral pleural effusions with atelectatic changes seen at the lung bases bilaterally or infiltrate. Chronic osteomyelitis involving L1 and L2 with hardware seen within the lower lumbar spine. There is no evidence of acute intra-or pelvic abnormality. Extensive vascular calcifications seen throughout the thoracic and abdominal aorta and mesenteric and iliac vessels. CT Cervical Spine WO Contrast    Result Date: 7/31/2022  Multilevel degenerative changes seen within the cervical spine with no acute abnormality of the cervical spine. XR CHEST PORTABLE    Result Date: 8/4/2022  No acute process     XR CHEST PORTABLE    Result Date: 8/3/2022  Endotracheal tube and nasogastric tubes in satisfactory position. XR CHEST PORTABLE    Result Date: 8/3/2022  Presumed enteric tube with tip in the distal esophagus. Repositioning is recommended. Enteric tube positioned as described. Bilateral lower lobe infiltrates and small bilateral pleural effusions. XR CHEST PORTABLE    Result Date: 8/1/2022  1. Small patchy opacity within the left lung base which could represent atelectasis or pneumonia. Atelectasis is favored. 2. The right lung is clear. XR ABDOMEN FOR NG/OG/NE TUBE PLACEMENT    Result Date: 8/3/2022  Catheter is in the right upper abdomen, probably stomach in conjunction with patient positioning. XR CHEST ABDOMEN NG PLACEMENT    Result Date: 8/2/2022  Satisfactory placement of NG tube as described. Resident's Assessment and Plan     Laurine Romana Banter   , 78 y.o. , female came with CC : AMS     has a past medical history of Acute CVA (cerebrovascular accident) ([address] Acute infection of bone ([address] Acute osteomyelitis of phalanx of left hand ([address] Anemia of chronic disease, Arthritis, Breast cancer ([address] CAD (coronary artery disease), Carpal tunnel syndrome, Chronic diastolic CHF (congestive heart failure) ([address] CKD (chronic kidney disease) stage 4, GFR 15-29 ml/min ([address] Diabetic retinopathy ([address] Glaucoma, Hemodialysis patient ([address] Hyperkalemia, diminished renal excretion, Hyperlipidemia, Hypertension, Hypoglycemia unawareness in type 1 diabetes mellitus ([address] Insulin dependent type 2 diabetes mellitus ([address] Neuropathy, Osteomyelitis due to secondary diabetes [facility]), Patient is [employer], Refusal of blood product, Ventricular hypertrophy, Ventricular tachycardia ([address] and Vitreous hemorrhage ([address]          Days since Admission: 5  Days on Ventilator : 3    Consults:   Nephrology  ID   Neurology     Assessment:      Neurology    Acute versus subacute lacunar stroke  Patient is currently on dual antiplatelet management which includes aspirin 81 mg and Brilinta 90 mg twice daily  Lipitor 80 mg p.o. nightly  MRI was considered for reevaluation of stroke, however due to the presence of a spinal stimulator, repeat CT scan was ordered  Repeat CT scan reveals small hypodensity located in right jerardo, similar to previous CT, not acute intracranial abnormality   Follow EEG report   Follow Echo results   Intubation day 3  On Fentanyl 50 mcg/hr  Follow neurology recommendations      Cardiology    Hypotension due to missed midodrine medication VS sepsis due to wound infection  Wound culture positive for Proteus mirabilis and Serratia   Prior to second RRT, patient missed her midodrine dose  Midodrine held  Bumex held  Continue Levophed 18mcg/min  Start Vasopressin 0.03 units/min    Coronary artery disease status post stent in May 2022  Continue dual antiplatelet management  Continue statin    History of hyperlipidemia  Continue Lipitor    History of heart failure with reduced ejection fraction, ejection fraction 40 to 45%, last echo was done in April 2022  Bumex held due to hypotension        Renal    End-stage renal disease on hemodialysis 3 times a week  Patient is for sustained low efficiency dialysis, per nephrology recommendations    History of hyperuricemia  Continue allopurinol    Endocrine    History of type 2 diabetes mellitus  On 5 units Lantus nightly at home   Hold home dose  Monitor blood sugar levels      Infectious disease    Concern for sepsis likely secondary to chest wound infection due to Proteus mirabilis and Serratia   Wound culture positive for Proteus mirabilis and Serratia marcescens  Pancytopenia, improving   Low body temperature during RRT  D/C  Zosyn  D/C ceftriaxone  Continue doxycycline 100 mg q12h  Start Vancomycin one time dose, pharmacy to dose   Start Fluconazole 400mg IV once, followed by 200mg qday  Start Meropenem 1000 mg qday  Tip cultures for Femoral arterial line and central line, pending results  Repeat blood culture, pending results   Follow MRSA Nares culture   Follow infectious disease recommendations    Chest wound infection due to Mediport removal  Mediport removed 2/2 hardware infection in June 2022  Vitamin C and Zinc for wound care   Wound care consult      Hematology    Pancytopenia likely secondary to infection/sepsis-improving  Continue to monitor CBC      Cannot receive blood products due to Denominational believes      DVT ppx: Heparin 5000 units  GI ppx: Protonix 40 mg        Code Status:   Full     Disposition:Continue current care    Cholo Torres MD, PGY-1    Attending physician: Dr. Cris Lane    NOTE: This report was transcribed using voice recognition software. Every effort was made to ensure accuracy; however, inadvertent computerized transcription errors may be present.

## 2022-08-05 NOTE — PROGRESS NOTES
Nephrology Progress Note  Patient's Name: Wilian Graves  7:17 PM  8/5/2022        Reason for Consult:  ESRD  Requesting Physician:  Fred Wilcox DO    Chief Complaint:  hypotension, altered mental status  History Obtained From:  EHR    History of Present Ilness:    Wilian Graves is a 78 y.o. female with a history of ESRD HD dependent, vertebral osteomyelitis ( s/p antibiotic treatment ) on suppressive antibiotic therapy with doxycycline, CAD, right breast cancer( s/p chemotx ; s/p mastectomy ) ) and several other medical problems listed below. Patient was observed to be slow to response following her hemodialysis treatment at the facility. She was seen in the ED for evaluation. No history of a fall. Initial evaluation in the ED revealed patient have a blood pressure 134/87, pulse of 56 and a temperature of 97.9. She was noted to have intermittent hypotensive episodes. Laboratory data showed WBC 2.5, hemoglobin 8.1, platelet count 21,050. CHEM profile was consistent with her ESRD status. Patient was admitted to telemetry for continuation of care. Patient developed marked hypotension last evening. RRT was called. Initial blood pressure noted to be 80/40. She was given 250 cc normal saline bolus. Subsequently transferred to MICU for further management.     Subjective    8/4: worsening mental status last pm , requiring intubation  8/5: Remains intubated; worsening hypotension; pressors being increased    Allergies:  Furosemide    Current Medications:    vasopressin 20 Units in dextrose 5 % 100 mL infusion, Continuous  ascorbic acid (VITAMIN C) tablet 500 mg, Daily  zinc sulfate (ZINCATE) capsule 50 mg, Daily  thiamine (B-1) injection 250 mg, Daily  meropenem (MERREM) 1,000 mg in sodium chloride 0.9 % 100 mL IVPB (mini-bag), Q24H  [START ON 8/6/2022] fluconazole (DIFLUCAN) 200 mg IVPB, Q24H  vancomycin (VANCOCIN) intermittent dosing (placeholder), RX Placeholder  [START ON 8/6/2022] vancomycin (VANCOCIN) 750 mg in dextrose 5 % 250 mL IVPB, Once  pantoprazole (PROTONIX) 40 mg in sodium chloride (PF) 10 mL injection, Daily  chlorhexidine (PERIDEX) 0.12 % solution 15 mL, BID  aspirin chewable tablet 81 mg, Daily  perflutren lipid microspheres (DEFINITY) injection 1.65 mg, ONCE PRN  fentaNYL 10 mcg/ml in 0.9%  ml infusion, Continuous  glucose chewable tablet 16 g, PRN  dextrose bolus 10% 125 mL, PRN   Or  dextrose bolus 10% 250 mL, PRN  glucagon (rDNA) injection 1 mg, PRN  dextrose 10 % infusion, Continuous PRN  [Held by provider] midodrine (PROAMATINE) tablet 10 mg, TID WC  norepinephrine (LEVOPHED) 16 mg in dextrose 5% 250 mL infusion, Continuous  doxycycline hyclate (VIBRAMYCIN) capsule 100 mg, 2 times per day  gabapentin (NEURONTIN) capsule 100 mg, Nightly  hydrocortisone sodium succinate PF (SOLU-CORTEF) injection 100 mg, Q8H  white petrolatum ointment, TID PRN  sodium chloride flush 0.9 % injection 5-40 mL, 2 times per day  sodium chloride flush 0.9 % injection 5-40 mL, PRN  0.9 % sodium chloride infusion, PRN  ondansetron (ZOFRAN-ODT) disintegrating tablet 4 mg, Q8H PRN   Or  ondansetron (ZOFRAN) injection 4 mg, Q6H PRN  polyethylene glycol (GLYCOLAX) packet 17 g, Daily PRN  acetaminophen (TYLENOL) tablet 650 mg, Q6H PRN   Or  acetaminophen (TYLENOL) suppository 650 mg, Q6H PRN  allopurinol (ZYLOPRIM) tablet 100 mg, Daily  atorvastatin (LIPITOR) tablet 80 mg, Nightly  brimonidine (ALPHAGAN) 0.2 % ophthalmic solution 1 drop, BID  [Held by provider] bumetanide (BUMEX) tablet 2 mg, Once per day on Sun Tue Thu  lidocaine-prilocaine (EMLA) cream 1 Dose, See Admin Instructions  latanoprost (XALATAN) 0.005 % ophthalmic solution 1 drop, Nightly  [Held by provider] oxyCODONE-acetaminophen (PERCOCET) 5-325 MG per tablet 2 tablet, Q8H PRN  [Held by provider] sertraline (ZOLOFT) tablet 25 mg, Daily  ticagrelor (BRILINTA) tablet 90 mg, BID  white petrolatum ointment, BID  heparin (porcine) injection 5,000 Units, Q8H      Review of Systems:   Review of systems not obtained due to patient factors. Physical exam:  Vitals:    08/05/22 1900   BP:    Pulse: 77   Resp:    Temp:    SpO2: 100%          General: intubated, sedated  Eyes: PERRL. No sclera icterus. No conjunctival injection. ENT: No discharge. Pharynx clear. Neck: Trachea midline. Normal thyroid. Lungs: No accessory muscle use. few scattered rhonchi  Chest: R upper chest wall small wound, dressing applied  CV: Regular rate. Regular rhythm. No murmur or rub. .   Abd: Non-tender. Non-distended. No masses. No organmegaly. Normal bowel sounds. Skin: Warm and dry. No nodule on exposed extremities. No rash on exposed extremities.   Ext: bilateral UE edema; RUE sleeve, +lymphedema; LUE AVFgood thrill and bruit  Neuro: lethargic, opens eyes to name, follows some simple commands      Data:   Labs:  Lab Results   Component Value Date     08/05/2022     08/04/2022     08/04/2022    K 4.9 08/05/2022    K 4.7 08/04/2022    K 4.8 08/04/2022     08/05/2022    CO2 17 (L) 08/05/2022    CO2 18 (L) 08/04/2022    CO2 18 (L) 08/04/2022    CREATININE 2.6 (H) 08/05/2022    CREATININE 2.2 (H) 08/04/2022    CREATININE 2.7 (H) 08/04/2022    BUN 18 08/05/2022    BUN 12 08/04/2022    BUN 17 08/04/2022    GLUCOSE 257 (H) 08/05/2022    GLUCOSE 178 (H) 08/04/2022    GLUCOSE 143 (H) 08/04/2022    PHOS 2.5 08/04/2022    PHOS 3.1 08/03/2022    PHOS 3.0 08/02/2022    WBC 8.4 08/05/2022    WBC 4.2 (L) 08/04/2022    WBC 2.8 (L) 08/03/2022    HGB 9.8 (L) 08/05/2022    HGB 8.4 (L) 08/04/2022    HGB 8.1 (L) 08/03/2022    HCT 29.4 (L) 08/05/2022    HCT 24.6 (L) 08/04/2022    HCT 25.2 (L) 08/03/2022    MCV 82.4 08/05/2022     08/05/2022         Imaging:  CT Head WO Contrast    Result Date: 8/1/2022  EXAMINATION: CT OF THE HEAD WITHOUT CONTRAST  8/1/2022 1:09 pm TECHNIQUE: CT of the head was performed without the administration of intravenous contrast. Automated small patchy opacity seen within the left lung base which could represent atelectasis or less likely pneumonia. The left upper lobe is clear. 1. Small patchy opacity within the left lung base which could represent atelectasis or pneumonia. Atelectasis is favored. 2. The right lung is clear. Glenny Agus Charlene Branch osteoblast most of the muscle last part of    Assessment    Shock, septic vs hypovolemic; cultures no growth yet; dids not respond to fluid bolus  Altered mental status due to R pontine infarct  Acute resp failure  R upper chest wall wound, suspected coloniized,; ( serratia and proteus )  Pancytopenia , ?  Malignancy related  ESRD, HD dependent MWF, although next treatment scheduled for 8/6    Plan    Panculture  Zosyn stopped; continues on suppressive doxycycline  For SLED tomorrow  Neurology following  ID following  For MRI of brain when able    Updated patients sister at bedside    D/W Resident and Dr Linda Bautista MD  7:17 PM  8/5/2022

## 2022-08-05 NOTE — PLAN OF CARE
Problem: Chronic Conditions and Co-morbidities  Goal: Patient's chronic conditions and co-morbidity symptoms are monitored and maintained or improved  8/4/2022 2142 by Gwendel Osgood, RN  Outcome: Progressing  8/4/2022 1945 by Shahzad Friedman RN  Outcome: Progressing     Problem: Discharge Planning  Goal: Discharge to home or other facility with appropriate resources  Outcome: Progressing     Problem: Pain  Goal: Verbalizes/displays adequate comfort level or baseline comfort level  8/4/2022 2142 by Gwendel Osgood, RN  Outcome: Progressing  8/4/2022 1945 by Shahzad Friedman RN  Outcome: Progressing     Problem: Safety - Adult  Goal: Free from fall injury  8/4/2022 2142 by Gwendel Osgood, RN  Outcome: Progressing  8/4/2022 1945 by Shahzad Friedman RN  Outcome: Progressing  Flowsheets (Taken 8/4/2022 0800)  Free From Fall Injury: Instruct family/caregiver on patient safety     Problem: ABCDS Injury Assessment  Goal: Absence of physical injury  8/4/2022 2142 by Gwendel Osgood, RN  Outcome: Progressing  8/4/2022 1945 by Shahzad Friedman RN  Outcome: Progressing  Flowsheets (Taken 8/4/2022 0800)  Absence of Physical Injury: Implement safety measures based on patient assessment     Problem: Skin/Tissue Integrity  Goal: Absence of new skin breakdown  Description: 1. Monitor for areas of redness and/or skin breakdown  2. Assess vascular access sites hourly  3. Every 4-6 hours minimum:  Change oxygen saturation probe site  4. Every 4-6 hours:  If on nasal continuous positive airway pressure, respiratory therapy assess nares and determine need for appliance change or resting period.   8/4/2022 2142 by Gwendel Osgood, RN  Outcome: Progressing  8/4/2022 1945 by Shahzad Friedman RN  Outcome: Progressing     Problem: Cardiovascular - Adult  Goal: Maintains optimal cardiac output and hemodynamic stability  Outcome: Progressing     Problem: Metabolic/Fluid and Electrolytes - Adult  Goal: Hemodynamic stability and optimal renal function maintained  Outcome: Progressing     Problem: Nutrition Deficit:  Goal: Optimize nutritional status  Outcome: Progressing     Problem: Respiratory - Adult  Goal: Achieves optimal ventilation and oxygenation  8/4/2022 2142 by Axel Llamas RN  Outcome: Progressing  8/4/2022 1945 by Kathya Rangel RN  Outcome: Progressing  8/4/2022 0914 by Goyo Raymundo. JACINTO David  Outcome: Progressing  Flowsheets  Taken 8/4/2022 0914 by Goyo Raymundo.  JACINTO David  Achieves optimal ventilation and oxygenation:   Assess for changes in respiratory status   Position to facilitate oxygenation and minimize respiratory effort   Assess the need for suctioning and aspirate as needed   Respiratory therapy support as indicated   Oxygen supplementation based on oxygen saturation or arterial blood gases  Taken 8/4/2022 0800 by Kathya Rangel RN  Achieves optimal ventilation and oxygenation:   Assess for changes in respiratory status   Assess the need for suctioning and aspirate as needed   Respiratory therapy support as indicated   Oxygen supplementation based on oxygen saturation or arterial blood gases   Position to facilitate oxygenation and minimize respiratory effort

## 2022-08-05 NOTE — PROGRESS NOTES
[address] [place] [address] [facility]  [facility]  Progress Note      Chief Complaint   Patient presents with    Altered Mental Status     LKW unknown. From dialysis, originally from [facility]. Sent in from dialysis d/t slow responses. Had full treatment. BGL 88        SUBJECTIVE:  Patient is tolerating medications. No reported adverse drug reactions. No nausea, vomiting, diarrhea. Sedated with fentanyl  Intubated because of agitation and airway protection  Secretions are minimal  40% FiO2 afebrile  EEG report pending  Still on vasopressin and Levophed    Review of systems:  As stated above in the chief complaint, otherwise negative.     Medications:  Scheduled Meds:   ascorbic acid  500 mg Oral Daily    zinc sulfate  50 mg Oral Daily    thiamine  250 mg IntraVENous Daily    pantoprazole (PROTONIX) 40 mg injection  40 mg IntraVENous Daily    chlorhexidine  15 mL Mouth/Throat BID    aspirin  81 mg Oral Daily    cefTRIAXone (ROCEPHIN) IV  2,000 mg IntraVENous Q24H    [Held by provider] midodrine  10 mg Oral TID WC    doxycycline hyclate  100 mg Oral 2 times per day    gabapentin  100 mg Oral Nightly    hydrocortisone sodium succinate PF  100 mg IntraVENous Q8H    sodium chloride flush  5-40 mL IntraVENous 2 times per day    allopurinol  100 mg Oral Daily    atorvastatin  80 mg Oral Nightly    brimonidine  1 drop Right Eye BID    [Held by provider] bumetanide  2 mg Oral Once per day on Sun Tue Thu    lidocaine-prilocaine  1 Dose Topical See Admin Instructions    latanoprost  1 drop Both Eyes Nightly    [Held by provider] sertraline  25 mg Oral Daily    ticagrelor  90 mg Oral BID    white petrolatum   Topical BID    heparin (porcine)  5,000 Units SubCUTAneous Q8H     Continuous Infusions:   vasopressin (Septic Shock) infusion 0.03 Units/min (08/05/22 1149)    fentaNYL 50 mcg/hr (08/05/22 1136)    dextrose      norepinephrine 18 mcg/min (08/05/22 1136)    sodium chloride       PRN Meds:perflutren lipid microspheres, glucose, dextrose bolus **OR** dextrose bolus, glucagon (rDNA), dextrose, white petrolatum, sodium chloride flush, sodium chloride, ondansetron **OR** ondansetron, polyethylene glycol, acetaminophen **OR** acetaminophen, [Held by provider] oxyCODONE-acetaminophen    OBJECTIVE:  BP (!) 128/46   Pulse 83   Temp 98.6 °F (37 °C) (Axillary)   Resp 14   Ht 5' 10\" (1.778 m)   Wt 144 lb 10 oz (65.6 kg)   SpO2 99%   BMI 20.75 kg/m²   Temp  Av.9 °F (36.6 °C)  Min: 96.8 °F (36 °C)  Max: 98.6 °F (37 °C)  Constitutional: The patient is sedated and intubated  Skin: Warm and dry. No rashes were noted. 2022 chest    2022 coccyx  HEENT: Round and reactive pupils. Moist mucous membranes. No ulcerations or thrush. Neck: Supple to movements. Chest: No use of accessory muscles to breathe. Symmetrical expansion. No wheezing, crackles or rhonchi. Cardiovascular: S1 and S2 are rhythmic and regular. No murmurs appreciated. Abdomen: Positive bowel sounds to auscultation. Benign to palpation. No masses felt. No hepatosplenomegaly. Genitourinary: Gant  Extremities: No clubbing, no cyanosis, bilateral upper and lower extremity edema.   Lines: peripheral  8/3/2022 right femoral art line  2022 left femoral triple-lumen catheter  Both lines were sitting in a pool of fecal liquid matter  Laboratory and Tests Review:  Lab Results   Component Value Date    WBC 8.4 2022    WBC 4.2 (L) 2022    WBC 2.8 (L) 2022    HGB 9.8 (L) 2022    HCT 29.4 (L) 2022    MCV 82.4 2022     2022     Lab Results   Component Value Date    NEUTROABS 7.90 (H) 2022    NEUTROABS 3.65 2022    NEUTROABS 2.66 2022     Lab Results   Component Value Date    CRPHS 0.7 2016    CRPHS 2.6 2016    CRPHS 7.6 (H) 2015     Lab Results   Component Value Date    ALT 62 (H) 2022    AST 65 (H) 2022    ALKPHOS 161 (H) 2022    BILITOT 0.3 2022     Lab Results   Component Value Date/Time     08/05/2022 04:08 AM    K 4.9 08/05/2022 04:08 AM    K 3.1 08/02/2022 06:25 AM     08/05/2022 04:08 AM    CO2 17 08/05/2022 04:08 AM    BUN 18 08/05/2022 04:08 AM    CREATININE 2.6 08/05/2022 04:08 AM    CREATININE 2.2 08/04/2022 05:58 PM    CREATININE 2.7 08/04/2022 11:40 AM    GFRAA 22 08/05/2022 04:08 AM    LABGLOM 22 08/05/2022 04:08 AM    GLUCOSE 257 08/05/2022 04:08 AM    GLUCOSE 46 04/02/2012 11:00 AM    PROT 5.1 08/02/2022 01:15 AM    LABALBU 2.8 08/02/2022 01:15 AM    LABALBU 3.8 04/02/2012 11:00 AM    CALCIUM 9.1 08/05/2022 04:08 AM    BILITOT 0.3 08/02/2022 01:15 AM    ALKPHOS 161 08/02/2022 01:15 AM    AST 65 08/02/2022 01:15 AM    ALT 62 08/02/2022 01:15 AM     Lab Results   Component Value Date    CRP 1.4 (H) 07/11/2022    CRP 7.7 (H) 04/13/2022    CRP 0.7 (H) 01/20/2022     Lab Results   Component Value Date    SEDRATE 30 (H) 06/15/2022    SEDRATE 81 (H) 04/13/2022    SEDRATE 19 01/20/2022     Radiology:  Reviewed chest x-ray    Microbiology:   Lab Results   Component Value Date/Time    BC 24 Hours no growth 08/01/2022 12:33 PM    BC 5 Days no growth 07/11/2022 02:05 PM    BC 5 Days no growth 06/15/2022 09:03 PM    ORG Proteus mirabilis 08/03/2022 06:39 PM    ORG Serratia marcescens 08/03/2022 06:39 PM    ORG Staphylococcus epidermidis 04/18/2022 11:54 AM     Lab Results   Component Value Date/Time    BLOODCULT2 24 Hours no growth 08/01/2022 12:43 PM    BLOODCULT2 5 Days no growth 07/11/2022 02:05 PM    BLOODCULT2 5 Days no growth 06/15/2022 09:46 PM    ORG Proteus mirabilis 08/03/2022 06:39 PM    ORG Serratia marcescens 08/03/2022 06:39 PM    ORG Staphylococcus epidermidis 04/18/2022 11:54 AM     WOUND/ABSCESS   Date Value Ref Range Status   08/03/2022 Moderate growth  Final   08/03/2022 Heavy growth  Final   12/20/2021 Heavy growth  Final   12/20/2021 Light growth  Final     Smear, Respiratory   Date Value Ref Range Status   06/16/2022   Final Few Polymorphonuclear leukocytes  Epithelial cells not seen  Moderate Gram positive cocci in pairs       No results found for: MPNEUMO, CLAMYDCU, LABLEGI, AFBCX, FUNGSM, LABFUNG  CULTURE, RESPIRATORY   Date Value Ref Range Status   06/16/2022 Oral Pharyngeal Cas reduced  Final     Culture Catheter Tip   Date Value Ref Range Status   06/27/2022 Growth not present  Final     No results found for: BFCS  Culture Surgical   Date Value Ref Range Status   04/18/2022 Growth not present  Final     Urine Culture, Routine   Date Value Ref Range Status   11/07/2017 Growth not present  Final   09/22/2014   Final    ,000 CFU/mL  Mixed cas isolated. Further workup and sensitivity testing  is not routinely indicated and will not be performed.   Mixed cas isolated includes:  Mixed gram positive organisms  Gram negative rods       MRSA Culture Only   Date Value Ref Range Status   07/11/2022 Methicillin resistant Staph aureus not isolated  Final   10/04/2014 Methicillin resistant Staph aureus not isolated  Final     Microbiology   8/1/2022-blood cultures negative at this time  8/3/2022 wound Serratia and Proteus  8/4/2022 nares swab pending  ASSESSMENT:    New acute lacunar infarct  Treated for vertebral osteomyelitis and now on suppressive doxycycline  Rule out aspiration pneumonia-chest x-ray -is clear and the CT scan are more consistent with fluid in the lower bases  Neutropenia-improved  Chest and sacral wounds are stable, do not appear infected and colonized  Reviewed cultures from [id number] [facility] as well as [address] may be infected by fecal contamination    PLAN:  Continue doxycycline suppressive  Monday Wednesday Friday hemodialysis  Discussed with the care staff -lines need to be removed from the groin and changes in her clinical status with more pressor requirements and increased white count may reflect septicemia associated with infected lines  Suggest  fecal management system  Blood cultures  Start Vanco x1, meropenem and Diflucan pending cultures  Follow clinically  Check final cultures  Monitor labs    Dot Marcos MD  1:32 PM  8/5/2022

## 2022-08-05 NOTE — PROGRESS NOTES
Physician Progress Note      Ty Oh  CSN #:                  [id number]  :                       1955  ADMIT DATE:       2022 11:42 AM  DISCH DATE:  RESPONDING  PROVIDER #:        Thony FRANK          QUERY TEXT:    Pt admitted with Altered Mental Status. Pt noted to have Decreased WBC,   Temperature <96.8; hypotension. If possible, please document in the progress   notes and discharge summary if you are evaluating and /or treating any of the   following: The medical record reflects the following:  Risk Factors: Previous vertebral osteomyelitis  Clinical Indicators:  22 Per critical care: Hypotension due to missed midodrine dose VS R?O   sepsis 2/2 unknown cause  22: Per Significant event: Hypotension 2/2 missed medication, rule out   sepsis  22: WBC: 2.5;  BP's 98/52; 90/19;  76/39; Chest X-ray--small patchy   opacity within the left lung base which could represent atelectasis or   pneumonia  22: WBC: 3.6; Procalcitonin 0.28; Lactic acid 0.6; Temp 96.0; Heart   rate ;  BP's 75/36;  64/27  Treatment: Critical care consult; Zosyn IV: Laboratory monitoring; imaging;   Blood cultures    Thank You,  Margaret KIMN, R.N.  Clinical Documentation Improvement  [phone]  Options provided:  -- Sepsis, present on admission  -- Sepsis, present on admission, now resolved  -- Sepsis, not present on admission  -- Sepsis was ruled out  -- Other - I will add my own diagnosis  -- Disagree - Not applicable / Not valid  -- Disagree - Clinically unable to determine / Unknown  -- Refer to Clinical Documentation Reviewer    PROVIDER RESPONSE TEXT:    This patient has sepsis that was not present on admission.     Query created by: Alejandra Khan on 8/3/2022 12:05 PM      Electronically signed by:  Beverly Dobson 2022 8:24 AM

## 2022-08-05 NOTE — PROGRESS NOTES
RN went into patient's room to assess pt and change chest dressing. Patient had a large liquid BM. The outside of central line and art line dressings noted to have stool on them. Once patient was cleaned and dressings were removed there was no stool present under either the central line or arterial line dressing. Dr. Karlie Good at bedside at this time. Sites cleansed and new sterile dressings applied. FMS placed for management of liquid stool.

## 2022-08-05 NOTE — PROGRESS NOTES
I saw and examined the patient in the ICU in the morning. I discussed the case in detail with resident staff, nursing and respiratory therapist as needed. I reviewed the pertinent laboratory studies, imaging studies, and records. Past medical history, surgical history, family history, and social history are unchanged unless stated in the history of present illness. I have reviewed the patient's medications and allergies. 79-year-old with history of ESRD on hemodialysis, CAD s/p stent, HFrEF, IDDM, hypertension, chronic lumbar CoNS osteomyelitis s/p vancomycin x 6 wks after dialysis, suppressive antibiotic therapy with doxycycline, BRCA s/p mastectomy, Christian, sent in from dialysis center on 8/1 after completing her dialysis session for altered mental status. Admitted to medicine where 2 RRTs due to hypotension and patient was subsequently transferred to ICU. Work-up here with CT head shows right jerardo lacunar infarct. CTTAP:  Irregularity identified at the site of the prior ruth catheter along the right anterior chest wall with small radiopaque density seen in the  superior soft tissues suggesting possibly calcification or radiopaque foreign body. Extensive anasarca, Chronic osteomyelitis involving L1 and L2 with hardware seen within the lower  lumbar spine. Extensive vascular calcifications     Acute encephalopathy  CVA -unknown acuity. MRI brain pending  Sepsis / septic shock  Proteus mirabilis / Serratia marcescens chest wall infection (site of port removal on left)  Lumbar osteomyelitis  Spinal cord stimulator present  Hypoglycemia / IDDM  Pancytopenia / Christian     Patient's mental status continued to worsen and ABG showing worsening hypoxia. Intubated 8/3. Continue mechanical ventilatory support.  Weaning trials  Sedation with fentanyl    Levophed to maintain MAP above 65, Trend lactate  Added vasopressin due to worsening hypotension  Solu-Cortef  Diarrhea, sending C. difficile,  ID broadening antibiotics to vancomycin, meropenem, Diflucan    Aspirin, Brilinta, statin. rpt CTH - Stable small hypodensity in the right jerardo  EEG, ECHO pending    Ct Sustained low-efficiency dialysis (SLED)    Change femoral line  Tube feed  Prophylaxis with subcu heparin     Discussed with her brother Luke Davila at bedside     This patient is unstable and critically ill. There are life and organ supporting interventions that require frequent monitoring and personal assessment. There is a high possibility of sudden, clinically significant or life-threatening deterioration in this patient's condition which may require prompt therapeutic interventions. I spent 30 independent minutes of critical care time total throughout the day excluding procedures.      Osmani Hatch MD MS  Pulmonary & [address] [place] [address]

## 2022-08-06 ENCOUNTER — APPOINTMENT (OUTPATIENT)
Dept: GENERAL RADIOLOGY | Age: 67
DRG: 064 | End: 2022-08-06
Payer: COMMERCIAL

## 2022-08-06 ENCOUNTER — APPOINTMENT (OUTPATIENT)
Dept: ULTRASOUND IMAGING | Age: 67
DRG: 064 | End: 2022-08-06
Payer: COMMERCIAL

## 2022-08-06 ENCOUNTER — APPOINTMENT (OUTPATIENT)
Dept: CT IMAGING | Age: 67
DRG: 064 | End: 2022-08-06
Payer: COMMERCIAL

## 2022-08-06 LAB
AADO2: 62.7 MMHG
ANGLE (CLOT STRENGTH): 68.3 DEGREE (ref 59–74)
ANION GAP SERPL CALCULATED.3IONS-SCNC: 12 MMOL/L (ref 7–16)
ANISOCYTOSIS: ABNORMAL
B.E.: -2.4 MMOL/L (ref -3–3)
BASOPHILS ABSOLUTE: 0 E9/L (ref 0–0.2)
BASOPHILS RELATIVE PERCENT: 0.1 % (ref 0–2)
BLOOD CULTURE, ROUTINE: NORMAL
BUN BLDV-MCNC: 29 MG/DL (ref 6–23)
CALCIUM SERPL-MCNC: 9.2 MG/DL (ref 8.6–10.2)
CHLORIDE BLD-SCNC: 97 MMOL/L (ref 98–107)
CO2: 20 MMOL/L (ref 22–29)
COHB: 1.3 % (ref 0–1.5)
CREAT SERPL-MCNC: 3.3 MG/DL (ref 0.5–1)
CRITICAL: ABNORMAL
CULTURE, BLOOD 2: NORMAL
DATE ANALYZED: ABNORMAL
DATE OF COLLECTION: ABNORMAL
EOSINOPHILS ABSOLUTE: 0 E9/L (ref 0.05–0.5)
EOSINOPHILS RELATIVE PERCENT: 0 % (ref 0–6)
EPL-TEG: 0 % (ref 0–15)
FERRITIN: 2686 NG/ML
FIO2: 40 %
G-TEG: 7.8 K D/SC (ref 4.5–11)
GFR AFRICAN AMERICAN: 17
GFR NON-AFRICAN AMERICAN: 17 ML/MIN/1.73
GLUCOSE BLD-MCNC: 297 MG/DL (ref 74–99)
HCO3: 20.7 MMOL/L (ref 22–26)
HCT VFR BLD CALC: 16.4 % (ref 34–48)
HCT VFR BLD CALC: 16.8 % (ref 34–48)
HCT VFR BLD CALC: 17.1 % (ref 34–48)
HCT VFR BLD CALC: 17.3 % (ref 34–48)
HEMOGLOBIN: 5.5 G/DL (ref 11.5–15.5)
HEMOGLOBIN: 5.7 G/DL (ref 11.5–15.5)
HEMOGLOBIN: 5.9 G/DL (ref 11.5–15.5)
HEMOGLOBIN: 5.9 G/DL (ref 11.5–15.5)
HHB: 0.8 % (ref 0–5)
HYPOCHROMIA: ABNORMAL
K (CLOTTING TIME): 1.5 MIN (ref 1–3)
LAB: ABNORMAL
LY30 (FIBRINOLYSIS): 0 % (ref 0–8)
LYMPHOCYTES ABSOLUTE: 0.33 E9/L (ref 1.5–4)
LYMPHOCYTES RELATIVE PERCENT: 3.5 % (ref 20–42)
Lab: ABNORMAL
MA (MAX AMPLITUDE): 61 MM (ref 50–70)
MAGNESIUM: 1.9 MG/DL (ref 1.6–2.6)
MCH RBC QN AUTO: 27.8 PG (ref 26–35)
MCHC RBC AUTO-ENTMCNC: 34.1 % (ref 32–34.5)
MCV RBC AUTO: 81.6 FL (ref 80–99.9)
METER GLUCOSE: 215 MG/DL (ref 74–99)
METER GLUCOSE: 222 MG/DL (ref 74–99)
METER GLUCOSE: 251 MG/DL (ref 74–99)
METER GLUCOSE: 293 MG/DL (ref 74–99)
METER GLUCOSE: 330 MG/DL (ref 74–99)
METHB: 0.4 % (ref 0–1.5)
MODE: AC
MONOCYTES ABSOLUTE: 0.17 E9/L (ref 0.1–0.95)
MONOCYTES RELATIVE PERCENT: 1.8 % (ref 2–12)
NEUTROPHILS ABSOLUTE: 7.89 E9/L (ref 1.8–7.3)
NEUTROPHILS RELATIVE PERCENT: 94.7 % (ref 43–80)
NUCLEATED RED BLOOD CELLS: 5.3 /100 WBC
O2 SATURATION: 99.4 % (ref 92–98.5)
O2HB: 97.5 % (ref 94–97)
OPERATOR ID: 366
OVALOCYTES: ABNORMAL
PATIENT TEMP: 37 C
PCO2: 28 MMHG (ref 35–45)
PDW BLD-RTO: 17.2 FL (ref 11.5–15)
PEEP/CPAP: 8 CMH2O
PFO2: 4.51 MMHG/%
PH BLOOD GAS: 7.49 (ref 7.35–7.45)
PHOSPHORUS: 3.3 MG/DL (ref 2.5–4.5)
PLATELET # BLD: 101 E9/L (ref 130–450)
PMV BLD AUTO: ABNORMAL FL (ref 7–12)
PO2: 180.3 MMHG (ref 75–100)
POIKILOCYTES: ABNORMAL
POLYCHROMASIA: ABNORMAL
POTASSIUM SERPL-SCNC: 4.4 MMOL/L (ref 3.5–5)
R (REACTION TIME): 5.9 MIN (ref 5–10)
RBC # BLD: 2.12 E12/L (ref 3.5–5.5)
REASON FOR REJECTION: NORMAL
REJECTED TEST: NORMAL
RI(T): 0.35
RR MECHANICAL: 14 B/MIN
SODIUM BLD-SCNC: 129 MMOL/L (ref 132–146)
SOURCE, BLOOD GAS: ABNORMAL
THB: 6.1 G/DL (ref 11.5–16.5)
TIME ANALYZED: 857
VANCOMYCIN RANDOM: 49.5 MCG/ML (ref 5–40)
VT MECHANICAL: 380 ML
WBC # BLD: 8.3 E9/L (ref 4.5–11.5)

## 2022-08-06 PROCEDURE — 82962 GLUCOSE BLOOD TEST: CPT

## 2022-08-06 PROCEDURE — 2500000003 HC RX 250 WO HCPCS: Performed by: INTERNAL MEDICINE

## 2022-08-06 PROCEDURE — A4216 STERILE WATER/SALINE, 10 ML: HCPCS | Performed by: INTERNAL MEDICINE

## 2022-08-06 PROCEDURE — 2580000003 HC RX 258: Performed by: INTERNAL MEDICINE

## 2022-08-06 PROCEDURE — 6360000002 HC RX W HCPCS: Performed by: INTERNAL MEDICINE

## 2022-08-06 PROCEDURE — 37799 UNLISTED PX VASCULAR SURGERY: CPT

## 2022-08-06 PROCEDURE — 36415 COLL VENOUS BLD VENIPUNCTURE: CPT

## 2022-08-06 PROCEDURE — 99291 CRITICAL CARE FIRST HOUR: CPT | Performed by: INTERNAL MEDICINE

## 2022-08-06 PROCEDURE — 99223 1ST HOSP IP/OBS HIGH 75: CPT | Performed by: INTERNAL MEDICINE

## 2022-08-06 PROCEDURE — 6370000000 HC RX 637 (ALT 250 FOR IP): Performed by: INTERNAL MEDICINE

## 2022-08-06 PROCEDURE — 36556 INSERT NON-TUNNEL CV CATH: CPT

## 2022-08-06 PROCEDURE — 80048 BASIC METABOLIC PNL TOTAL CA: CPT

## 2022-08-06 PROCEDURE — 85018 HEMOGLOBIN: CPT

## 2022-08-06 PROCEDURE — 99292 CRITICAL CARE ADDL 30 MIN: CPT | Performed by: INTERNAL MEDICINE

## 2022-08-06 PROCEDURE — 82805 BLOOD GASES W/O2 SATURATION: CPT

## 2022-08-06 PROCEDURE — 83735 ASSAY OF MAGNESIUM: CPT

## 2022-08-06 PROCEDURE — 85347 COAGULATION TIME ACTIVATED: CPT

## 2022-08-06 PROCEDURE — 85014 HEMATOCRIT: CPT

## 2022-08-06 PROCEDURE — 76604 US EXAM CHEST: CPT

## 2022-08-06 PROCEDURE — 71250 CT THORAX DX C-: CPT

## 2022-08-06 PROCEDURE — 76775 US EXAM ABDO BACK WALL LIM: CPT

## 2022-08-06 PROCEDURE — 94003 VENT MGMT INPAT SUBQ DAY: CPT

## 2022-08-06 PROCEDURE — 71045 X-RAY EXAM CHEST 1 VIEW: CPT

## 2022-08-06 PROCEDURE — 6370000000 HC RX 637 (ALT 250 FOR IP): Performed by: SPECIALIST

## 2022-08-06 PROCEDURE — 87040 BLOOD CULTURE FOR BACTERIA: CPT

## 2022-08-06 PROCEDURE — 85576 BLOOD PLATELET AGGREGATION: CPT

## 2022-08-06 PROCEDURE — 2000000000 HC ICU R&B

## 2022-08-06 PROCEDURE — C9113 INJ PANTOPRAZOLE SODIUM, VIA: HCPCS | Performed by: INTERNAL MEDICINE

## 2022-08-06 PROCEDURE — 82728 ASSAY OF FERRITIN: CPT

## 2022-08-06 PROCEDURE — 84100 ASSAY OF PHOSPHORUS: CPT

## 2022-08-06 PROCEDURE — 80202 ASSAY OF VANCOMYCIN: CPT

## 2022-08-06 PROCEDURE — 6370000000 HC RX 637 (ALT 250 FOR IP): Performed by: FAMILY MEDICINE

## 2022-08-06 PROCEDURE — 85025 COMPLETE CBC W/AUTO DIFF WBC: CPT

## 2022-08-06 PROCEDURE — 85384 FIBRINOGEN ACTIVITY: CPT

## 2022-08-06 PROCEDURE — 74176 CT ABD & PELVIS W/O CONTRAST: CPT

## 2022-08-06 RX ORDER — CYANOCOBALAMIN 1000 UG/ML
1000 INJECTION INTRAMUSCULAR; SUBCUTANEOUS ONCE
Status: COMPLETED | OUTPATIENT
Start: 2022-08-06 | End: 2022-08-06

## 2022-08-06 RX ORDER — DESMOPRESSIN ACETATE 4 UG/ML
20 INJECTION, SOLUTION INTRAVENOUS; SUBCUTANEOUS ONCE
Status: DISCONTINUED | OUTPATIENT
Start: 2022-08-06 | End: 2022-08-06 | Stop reason: ALTCHOICE

## 2022-08-06 RX ORDER — INSULIN LISPRO 100 [IU]/ML
0-4 INJECTION, SOLUTION INTRAVENOUS; SUBCUTANEOUS NIGHTLY
Status: DISCONTINUED | OUTPATIENT
Start: 2022-08-06 | End: 2022-08-11 | Stop reason: HOSPADM

## 2022-08-06 RX ORDER — INSULIN LISPRO 100 [IU]/ML
0-4 INJECTION, SOLUTION INTRAVENOUS; SUBCUTANEOUS
Status: DISCONTINUED | OUTPATIENT
Start: 2022-08-06 | End: 2022-08-11 | Stop reason: HOSPADM

## 2022-08-06 RX ORDER — FOLIC ACID 5 MG/ML
1 INJECTION, SOLUTION INTRAMUSCULAR; INTRAVENOUS; SUBCUTANEOUS DAILY
Status: DISCONTINUED | OUTPATIENT
Start: 2022-08-06 | End: 2022-08-11 | Stop reason: HOSPADM

## 2022-08-06 RX ADMIN — Medication 50 MG: at 09:01

## 2022-08-06 RX ADMIN — 0.12% CHLORHEXIDINE GLUCONATE 15 ML: 1.2 RINSE ORAL at 20:02

## 2022-08-06 RX ADMIN — BRIMONIDINE TARTRATE 1 DROP: 2 SOLUTION OPHTHALMIC at 20:05

## 2022-08-06 RX ADMIN — INSULIN LISPRO 2 UNITS: 100 INJECTION, SOLUTION INTRAVENOUS; SUBCUTANEOUS at 09:17

## 2022-08-06 RX ADMIN — Medication 50 MCG/HR: at 09:00

## 2022-08-06 RX ADMIN — EPOETIN ALFA-EPBX 2000 UNITS: 2000 INJECTION, SOLUTION INTRAVENOUS; SUBCUTANEOUS at 06:30

## 2022-08-06 RX ADMIN — MEROPENEM 1000 MG: 1 INJECTION, POWDER, FOR SOLUTION INTRAVENOUS at 16:00

## 2022-08-06 RX ADMIN — HYDROCORTISONE SODIUM SUCCINATE 100 MG: 100 INJECTION, POWDER, FOR SOLUTION INTRAMUSCULAR; INTRAVENOUS at 09:01

## 2022-08-06 RX ADMIN — HYDROCORTISONE SODIUM SUCCINATE 100 MG: 100 INJECTION, POWDER, FOR SOLUTION INTRAMUSCULAR; INTRAVENOUS at 17:45

## 2022-08-06 RX ADMIN — CYANOCOBALAMIN 1000 MCG: 1000 INJECTION, SOLUTION INTRAMUSCULAR at 06:47

## 2022-08-06 RX ADMIN — DESMOPRESSIN ACETATE 20 MCG: 4 SOLUTION INTRAVENOUS at 01:25

## 2022-08-06 RX ADMIN — HYDROCORTISONE SODIUM SUCCINATE 100 MG: 100 INJECTION, POWDER, FOR SOLUTION INTRAMUSCULAR; INTRAVENOUS at 01:26

## 2022-08-06 RX ADMIN — PETROLATUM: 420 OINTMENT TOPICAL at 09:01

## 2022-08-06 RX ADMIN — DOXYCYCLINE HYCLATE 100 MG: 100 CAPSULE ORAL at 09:01

## 2022-08-06 RX ADMIN — SODIUM CHLORIDE 125 MG: 9 INJECTION, SOLUTION INTRAVENOUS at 09:04

## 2022-08-06 RX ADMIN — BRIMONIDINE TARTRATE 1 DROP: 2 SOLUTION OPHTHALMIC at 09:02

## 2022-08-06 RX ADMIN — OXYCODONE HYDROCHLORIDE AND ACETAMINOPHEN 500 MG: 500 TABLET ORAL at 09:01

## 2022-08-06 RX ADMIN — LATANOPROST 1 DROP: 50 SOLUTION OPHTHALMIC at 20:04

## 2022-08-06 RX ADMIN — FLUCONAZOLE IN SODIUM CHLORIDE 200 MG: 2 INJECTION, SOLUTION INTRAVENOUS at 20:11

## 2022-08-06 RX ADMIN — SODIUM CHLORIDE, PRESERVATIVE FREE 40 MG: 5 INJECTION INTRAVENOUS at 09:01

## 2022-08-06 RX ADMIN — INSULIN LISPRO 1 UNITS: 100 INJECTION, SOLUTION INTRAVENOUS; SUBCUTANEOUS at 14:15

## 2022-08-06 RX ADMIN — ATORVASTATIN CALCIUM 80 MG: 40 TABLET, FILM COATED ORAL at 20:02

## 2022-08-06 RX ADMIN — 0.12% CHLORHEXIDINE GLUCONATE 15 ML: 1.2 RINSE ORAL at 09:00

## 2022-08-06 RX ADMIN — FOLIC ACID 1 MG: 5 INJECTION, SOLUTION INTRAMUSCULAR; INTRAVENOUS; SUBCUTANEOUS at 09:02

## 2022-08-06 RX ADMIN — ALLOPURINOL 100 MG: 100 TABLET ORAL at 09:01

## 2022-08-06 RX ADMIN — INSULIN LISPRO 2 UNITS: 100 INJECTION, SOLUTION INTRAVENOUS; SUBCUTANEOUS at 17:45

## 2022-08-06 RX ADMIN — DOXYCYCLINE HYCLATE 100 MG: 100 CAPSULE ORAL at 20:19

## 2022-08-06 RX ADMIN — SODIUM CHLORIDE, PRESERVATIVE FREE 10 ML: 5 INJECTION INTRAVENOUS at 09:02

## 2022-08-06 RX ADMIN — THIAMINE HYDROCHLORIDE 250 MG: 100 INJECTION, SOLUTION INTRAMUSCULAR; INTRAVENOUS at 09:14

## 2022-08-06 RX ADMIN — PETROLATUM: 420 OINTMENT TOPICAL at 20:05

## 2022-08-06 RX ADMIN — GABAPENTIN 100 MG: 100 CAPSULE ORAL at 20:04

## 2022-08-06 ASSESSMENT — PULMONARY FUNCTION TESTS
PIF_VALUE: 20
PIF_VALUE: 21
PIF_VALUE: 22
PIF_VALUE: 20
PIF_VALUE: 21
PIF_VALUE: 21
PIF_VALUE: 20
PIF_VALUE: 21
PIF_VALUE: 20
PIF_VALUE: 22
PIF_VALUE: 21
PIF_VALUE: 21
PIF_VALUE: 22
PIF_VALUE: 21
PIF_VALUE: 22
PIF_VALUE: 20
PIF_VALUE: 20
PIF_VALUE: 21
PIF_VALUE: 22
PIF_VALUE: 25
PIF_VALUE: 20
PIF_VALUE: 21
PIF_VALUE: 22
PIF_VALUE: 22
PIF_VALUE: 21
PIF_VALUE: 20
PIF_VALUE: 21
PIF_VALUE: 30
PIF_VALUE: 21
PIF_VALUE: 25
PIF_VALUE: 21
PIF_VALUE: 21
PIF_VALUE: 20
PIF_VALUE: 21
PIF_VALUE: 20
PIF_VALUE: 21
PIF_VALUE: 22
PIF_VALUE: 19
PIF_VALUE: 21
PIF_VALUE: 22
PIF_VALUE: 21
PIF_VALUE: 50
PIF_VALUE: 21
PIF_VALUE: 20
PIF_VALUE: 21
PIF_VALUE: 20
PIF_VALUE: 21
PIF_VALUE: 20
PIF_VALUE: 20
PIF_VALUE: 21
PIF_VALUE: 22

## 2022-08-06 ASSESSMENT — PAIN SCALES - WONG BAKER
WONGBAKER_NUMERICALRESPONSE: 0

## 2022-08-06 ASSESSMENT — PAIN SCALES - GENERAL
PAINLEVEL_OUTOF10: 0

## 2022-08-06 NOTE — PROGRESS NOTES
[facility]  Department of Internal Medicine   Internal Medicine Residency   MICU Progress Note    Patient:  Marcus Beltran 78 y.o. female  MRN: [id number]     Date of Service: 2022    Allergy: Furosemide    Subjective     Patient is sedated and intubated. Patient is unable to respond to questions. Intubation day 3.    24h change: No acute events overnight       Objective     VITAL SIGNS:  BP (!) 135/55   Pulse 77   Temp 97.4 °F (36.3 °C) (Axillary)   Resp 14   Ht 5' 10\" (1.778 m)   Wt 144 lb 10 oz (65.6 kg)   SpO2 100%   BMI 20.75 kg/m²   Tmax over 24 hours:  Temp (24hrs), Av.6 °F (36.4 °C), Min:96.8 °F (36 °C), Max:98.6 °F (37 °C)      Patient Vitals for the past 6 hrs:   BP Temp Temp src Pulse Resp SpO2   22 0600 -- -- -- 77 14 100 %   22 0500 -- -- -- 73 14 100 %   22 0400 (!) 135/55 97.4 °F (36.3 °C) Axillary 73 14 100 %   22 0300 -- -- -- 74 14 100 %           Intake/Output Summary (Last 24 hours) at 2022 0843  Last data filed at 2022 0618  Gross per 24 hour   Intake 1257.3 ml   Output 100 ml   Net 1157.3 ml       Wt Readings from Last 2 Encounters:   22 144 lb 10 oz (65.6 kg)   22 200 lb (90.7 kg)     Body mass index is 20.75 kg/m². I & O - 24hr:   Intake/Output Summary (Last 24 hours) at 2022 0843  Last data filed at 2022 0618  Gross per 24 hour   Intake 1257.3 ml   Output 100 ml   Net 1157.3 ml         Physical Exam:  General Appearance: Patient is intubated. RASS score -3  Neck: no adenopathy, no carotid bruit, supple, symmetrical, trachea midline, and thyroid not enlarged, symmetric, no tenderness/mass/nodules  Lung: clear to auscultation bilaterally  Heart: regular rate and rhythm, S1, S2 normal, no murmur, click, rub or gallop  Abdomen: soft, non-tender; bowel sounds normal; no masses,  no organomegaly  Extremities:  +1 bilateral edema   Musculoskeletal: No joint swelling, no muscle tenderness.  Unable to assess ROM.  Neurologic: Mental status: responds to pain by moving/nodding head   Skin: Right sided chest wound. Disc shaped wound about 2cm in diameter and depth. No erythema, swelling or discharge.      Lines     site day    Art line   R Femoral 3   TLC L Fem 5   PICC None    Hemoaccess Left arm       VENT SETTINGS (Comprehensive) (if applicable):  Vent Information  Ventilator ID: [id number]  Additional Respiratory Assessments  Heart Rate: 77  Resp: 14  SpO2: 100 %  Position: Semi-Vidal's  Humidification Source: Heated wire  Humidification Temp: 37  Circuit Condensation: Drained  Airway Type: ET  Airway Size: 7.5  Cuff Pressure (cm H2O): 29 cm H2O    ABGs:   Recent Labs     08/05/22  0439   PH 7.423   PCO2 28.6*   PO2 169.1*   HCO3 18.3*   BE -5.1*   O2SAT 99.2*         Laboratory findings:  Complete Blood Count:   Recent Labs     08/05/22 0408 08/05/22 2103 08/05/22 2327 08/06/22 0227 08/06/22 0402   WBC 8.4 9.3  --   --  8.3   HGB 9.8* 6.4* 5.9* 5.7* 5.9*   HCT 29.4* 19.1* 17.1* 16.8* 17.3*    106*  --   --  101*          Last 3 Blood Glucose:   Recent Labs     08/04/22 1758 08/05/22 0408 08/05/22 1935   GLUCOSE 178* 257* 330*          PT/INR:    Lab Results   Component Value Date/Time    PROTIME 11.0 08/01/2022 12:43 PM    PROTIME 10.4 02/15/2012 10:40 AM    INR 1.0 08/01/2022 12:43 PM     PTT:    Lab Results   Component Value Date/Time    APTT 45.6 08/01/2022 12:43 PM       Comprehensive Metabolic Profile:   Recent Labs     08/04/22 1758 08/05/22 0408 08/05/22 1935    133 131*   K 4.7 4.9 4.5    100 100   CO2 18* 17* 18*   BUN 12 18 24*   CREATININE 2.2* 2.6* 2.9*   GLUCOSE 178* 257* 330*   CALCIUM 8.8 9.1 8.8        Magnesium:   Lab Results   Component Value Date/Time    MG 1.9 08/05/2022 07:35 PM     Phosphorus:   Lab Results   Component Value Date/Time    PHOS 3.5 08/05/2022 07:35 PM     Ionized Calcium:   Lab Results   Component Value Date/Time    CAION 1.22 11/08/2017 04:28 AM Troponin: No results for input(s): TROPONINI in the last 72 hours. Medications     Infusions: (Fluid, Sedation, Vasopressors)    Levophed 18 mcg/min  Fentanyl 50 mcg/hr  Vasopressin 0.03 units/min    Nutrition: NG tube feeding, renal formula - day 3      ATB:   Antibiotics  Days   Meropenem  1   Vibramycin 3   Vancomycin  Fluconazole      Cultures:     Chest wound culture positive for proteus mirabilis and Serratia       Imaging     CT ABDOMEN PELVIS WO CONTRAST Additional Contrast? None    Result Date: 8/3/2022  Extensive anasarca seen throughout the soft tissues the chest abdomen and pelvis. Irregularity identified at the site of the prior ruth catheter along the right anterior chest wall with small radiopaque density seen in the superior soft tissues suggesting possibly calcification or radiopaque foreign body. Small bilateral pleural effusions with atelectatic changes seen at the lung bases bilaterally or infiltrate. Chronic osteomyelitis involving L1 and L2 with hardware seen within the lower lumbar spine. There is no evidence of acute intra-or pelvic abnormality. Extensive vascular calcifications seen throughout the thoracic and abdominal aorta and mesenteric and iliac vessels. CT HEAD WO CONTRAST    Result Date: 8/4/2022  Stable small hypodensity in the right jerardo, which could represent infarct. However, this should be confirmed with MRI. No new acute intracranial process identified. CT Head WO Contrast    Result Date: 8/1/2022  Small hypodensity right jerardo not demonstrated previously. Acute or subacute lacunar infarct at this location cannot be excluded. This finding could be more fully evaluated with MRI. CT Head WO Contrast    Result Date: 7/31/2022  No acute intracranial abnormality. Small scalp hematoma overlying the left orbit     CT CHEST WO CONTRAST    Result Date: 8/3/2022  Extensive anasarca seen throughout the soft tissues the chest abdomen and pelvis.   Irregularity identified at the site of the prior ruth catheter along the right anterior chest wall with small radiopaque density seen in the superior soft tissues suggesting possibly calcification or radiopaque foreign body. Small bilateral pleural effusions with atelectatic changes seen at the lung bases bilaterally or infiltrate. Chronic osteomyelitis involving L1 and L2 with hardware seen within the lower lumbar spine. There is no evidence of acute intra-or pelvic abnormality. Extensive vascular calcifications seen throughout the thoracic and abdominal aorta and mesenteric and iliac vessels. CT Cervical Spine WO Contrast    Result Date: 7/31/2022  Multilevel degenerative changes seen within the cervical spine with no acute abnormality of the cervical spine. XR CHEST PORTABLE    Result Date: 8/4/2022  No acute process     XR CHEST PORTABLE    Result Date: 8/3/2022  Endotracheal tube and nasogastric tubes in satisfactory position. XR CHEST PORTABLE    Result Date: 8/3/2022  Presumed enteric tube with tip in the distal esophagus. Repositioning is recommended. Enteric tube positioned as described. Bilateral lower lobe infiltrates and small bilateral pleural effusions. XR CHEST PORTABLE    Result Date: 8/1/2022  1. Small patchy opacity within the left lung base which could represent atelectasis or pneumonia. Atelectasis is favored. 2. The right lung is clear. XR ABDOMEN FOR NG/OG/NE TUBE PLACEMENT    Result Date: 8/3/2022  Catheter is in the right upper abdomen, probably stomach in conjunction with patient positioning. XR CHEST ABDOMEN NG PLACEMENT    Result Date: 8/2/2022  Satisfactory placement of NG tube as described.         Resident's Assessment and Plan     Claudia Gerber   , 78 y.o. , female came with CC : AMS     has a past medical history of Acute CVA (cerebrovascular accident) ([address] Acute infection of bone ([address] Acute osteomyelitis of phalanx of left hand ([address] Anemia of chronic disease, Arthritis, Breast cancer ([facility] [address] CAD (coronary artery disease), Carpal tunnel syndrome, Chronic diastolic CHF (congestive heart failure) ([facility] [address] CKD (chronic kidney disease) stage 4, GFR 15-29 ml/min ([facility] [address] Diabetic retinopathy ([facility] [address] Glaucoma, Hemodialysis patient ([facility] [address] Hyperkalemia, diminished renal excretion, Hyperlipidemia, Hypertension, 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          Days since Admission: 5  Days on Ventilator : 3    Consults:   Nephrology  ID   Neurology     Assessment:      Neurology    Acute versus subacute lacunar stroke  Patient is currently on dual antiplatelet management which includes aspirin 81 mg and Brilinta 90 mg twice daily  Lipitor 80 mg p.o. nightly  MRI was considered for reevaluation of stroke, however due to the presence of a spinal stimulator, repeat CT scan was ordered  Repeat CT scan reveals small hypodensity located in right jerardo, similar to previous CT, not acute intracranial abnormality   Follow EEG report   Follow Echo results   Intubation day 3  On Fentanyl 50 mcg/hr  Follow neurology recommendations      Cardiology    Hypotension due to missed midodrine medication VS sepsis due to wound infection  Wound culture positive for Proteus mirabilis and Serratia   Prior to second RRT, patient missed her midodrine dose  Midodrine held  Bumex held  Continue Levophed 18mcg/min  Start Vasopressin 0.03 units/min    Coronary artery disease status post stent in May 2022  Continue dual antiplatelet management  Continue statin    History of hyperlipidemia  Continue Lipitor    History of heart failure with reduced ejection fraction, ejection fraction 40 to 45%, last echo was done in April 2022  Bumex held due to hypotension        Renal    End-stage renal disease on hemodialysis 3 times a week  Patient is for sustained low efficiency dialysis, per nephrology recommendations    History of hyperuricemia  Continue allopurinol    Endocrine    History of type 2 diabetes mellitus  On 5 units Lantus nightly at home   Hold home dose  Monitor blood sugar levels      Infectious disease    Concern for sepsis likely secondary to chest wound infection due to Proteus mirabilis and Serratia   Wound culture positive for Proteus mirabilis and Serratia marcescens  Pancytopenia, improving   Low body temperature during RRT  D/C  Zosyn  D/C ceftriaxone  Continue doxycycline 100 mg q12h  Start Vancomycin one time dose, pharmacy to dose   Start Fluconazole 400mg IV once, followed by 200mg qday  Start Meropenem 1000 mg qday  Tip cultures for Femoral arterial line and central line, pending results  Repeat blood culture, pending results   Follow MRSA Nares culture   Follow infectious disease recommendations    Chest wound infection due to Mediport removal  Mediport removed 2/2 hardware infection in June 2022  Vitamin C and Zinc for wound care   Wound care consult      Hematology    Pancytopenia likely secondary to infection/sepsis-improving  Continue to monitor CBC      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 Rachana Pickens    NOTE: This report was transcribed using voice recognition software. Every effort was made to ensure accuracy; however, inadvertent computerized transcription errors may be present.

## 2022-08-06 NOTE — CONSULTS
Inpatient [facility] Cardiology Consultation      Reason for Consult: Chest wall hematoma, ventricular bigeminy, anemia on DAPT    Consulting Physician: Dr. Susan Piper    Requesting Physician: Dr. Preet Hernandez    Date of Consultation: 8/6/2022    HISTORY OF PRESENT ILLNESS:   Patient is a 78year old AAF known to Dr. Mounika Henson. She is being seen in consultation this hospital admission by Dr. Susan Piper for evaluation and recommendations regarding chest wall hematoma, anemia and ventricular bigeminy. 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, HTN now with hypotension issues requiring midodrine therapy, HLD, chronic anemia, gout, bladder CA s/p chemotherapy, right breast CA with mastectomy in 2005 s/p XRT and chemotherapy, CAD s/p PCI as noted below, chronic HFrEF with improved EF, ischemic cardiomyopathy, VHD, history of MV mass, spinal cord stimulator implantation, ESRD on HD since 2017, history of left middle finger/vertebral osteomyelitis now on suppressive doxycycline, chronically elevated troponin, dysphagia and history of symptomatic sinus bradycardia    Patient presented to [facility] on July 31, 2022 with AMS noted at dialysis. Noted to have slow responses from staff, not her typical baseline. Of note, patient is currently sedated and intubated. No family present at bedside during my time on the floor. As a result most of history obtained through chart review and discussion with medical staff  Two separate RRT initially while on the floor for hypotension (8/2) --> transferred to ICU requiring pressor support. Continues to require Levophed at 8 mcgs. Being treated for septic shock, likely in setting of wound infection (+chest wall wound culture for Proteus/Serratia). Currently C. difficile rule out. Intubated 8/3/2022 for respiratory failure, ABG for worsening mentation revealing hypoxia  Noted to have possible acute CVA, neurology following. Unable to do MRI due to hardware.   EEG with moderate to severe encephalopathy. AMS per neurology multifactorial due to metabolic encephalopathy, possibly CVA. After placement of left central line, patient was noted to have left chest wall hematoma on chest CT with drop in hemoglobin. Hemoglobin today 5.5g. Family continues to refuse blood products, as patient is Qu[employer]. Please note: past medical records were reviewed per electronic medical record (EMR) - see detailed reports under Past Medical/ Surgical History. PAST MEDICAL HISTORY:    Qu[employer]  Insulin requiring T2DM with neuropathy/retinopathy  HTN. Now with hypotension issues requiring midodrine therapy  HLD, on statin therapy  Chronic anemia  Gout  Bladder carcinoma s/p chemotherapy  Right breast cancer with mastectomy 2005 followed by radiation and chemotherapy, Arimidex. Chronic  lymphedema RUE. [facility] admission with SOB and chest discomfort 08/2011. Cardiac enzymes negative. CT angiogram of the chest negative for PE and dissection. EKG:  NSR, nonspecific ST T abnormalities. TTE 08/2011 with severe concentric LVH, normal wall motion. LAE, RVE, Stage I diastolic dysfunction. MPS 08/2011 with small area of lateral ischemia, normal wall motion and EF. Coronary artery disease, cardiac catheterization 08/30/2012:  CX 90% mid stenosis, OM2 totally occluded. LAD 20% stenosis, RVA 50% stenosis, LV normal.  She was treated medically. [facility] admission 02/13/2013 with SOB, orthopnea, nonproductive nocturnal cough and edema. CXR:  cardiomegaly and bilateral effusion. Rx with aggressive diuretic therapy. Echo 02/14/2013:  LVH, normal LV systolic function, Stage II diastolic dysfunction, LAE, normal RVSP, no valvulopathy. Lifetime non-smoker  NKDA  Echo, 07/17/2013. Mild concentric LVH with normal systolic function. RVSP 53 mmHg. Otherwise normal study. [facility] admission with worsening back pain and inability to walk on 09/22/2014 associated with mild SOB.  CXR: Pulmonary vascular congestion. .  Had decompressive laminectomy  Echo, 09/23/2014. Moderate CLVH, normal wall motion, Stage II diastolic dysfunction, RVE, OFELIA, RVSP=50 mmHg. Surgery, Dr. Matthew Wesley, 09/25/2014. Decompressive lumbar laminectomy L2-L5 with fusion L3-4 and L4-5. Insertion intrathecal drain, 10/04/2014 for CSF leak, removed several days later, but reinserted 10/12/2014 for recurrent leak. Implant spinal cord stimulator, Penta lead from [facility] by thoracic laminotomy, T10. Implant IPG Protege in the right buttock  TTE 11/2017, EF 55%. Mild LVH. Stage II diastolic dysfunction. Moderate to severe left atrial dilatation. Mild MR.  RVSP 76.  2017 ESRD dialysis initiated F   Abdominal CT 2017: Extensive anasarca. Extensive arteriosclerotic disease. Surgical history: Correction of Charcot joint right, carpal tunnel release 3/2020, cholecystectomy, dialysis fistula creation, mastectomy, laser treatment of eyes, spinal cord stimulator, surgery for retinal detachment. Spinal cord decompression L2.  11/2017 Insertion of right internal jugular vein tunneled hemodialysis catheter fluoroscopy Removal of right femoral temporary hemodialysis catheter  1/31/2018 Creation of L brachiocephalic AVF  8/87/5333 transposition of left upper extremity brachiobasilic  fistula, stage II  9/25/2018 Superficialization of L basilic AVF  30/03/4245  TTE: EF 60-65%. Moderate CLVH. No VHD. Ambulates with walker  Completed COVID Vaccine  2D echocardiogram [facility] December 8, 2020 EF 60 to 46% and diastolic dysfunction with moderate concentric left ventricular hypertrophy and moderate MAC and aortic valve sclerosis  Chronic HFrEF with improved EF. Ischemic cardiomyopathy.   MV mass / [address] admission May 2021 with a reported shockable rhythm by AED and received 1 defibrillation and had reported ventricular tachycardia in the emergency room and was started on a lidocaine drip, elevated troponin but no acute ischemic EKG changes, started on aspirin and Lipitor resumed and beta-blockers and nitrates initiated  Lexiscan stress test May 19, 2021, abnormal with a large fixed defect in the apical and septal wall small fixed defect in the inferior apical wall partially reversible defect in the small area of the anterior lateral wall and EF 25% with apical septal akinesis and moderate global hypokinesis and dilated left ventricle during stress and rest and was a high risk study  Left heart cath May 2021 she underwent stenting to the LAD and RCA. CONCLUSION: Coronary artery disease: Left main: 20% eccentric mid vessel narrowing. LAD:  50% proximal vessel narrowing and 95% mid vessel stenosis. Trivial diagonal branch with 90% stenosis. LCX:  Occluded after a small caliber first marginal branch which is a chronic total occlusion. The second larger marginal branch filled late. RCA:  Large, dominant vessel with 90% heavily calcified mid vessel stenosis. 50% disease at the level of the crux and 70% ostial stenosis of the posterior descending artery branch. Markedly elevated left ventricular end-diastolic pressure. Successful balloon angioplasty with deployment of drug-eluting coronary stent to the mid LAD with very good results. Successful balloon angioplasty with deployment of drug-eluting coronary stent to the mid RCA with poststent deployment dilatation with a larger high-pressure noncompliant balloon with good results. 2D echocardiogram on May 21, 2021 left ventricle is dilated There is apical, septal and basal inferior wall severe hypokinesis to akinesis Ejection fraction is visually estimated at 30+/-5%. There is doppler evidence of stage III diastolic dysfunction. Normal right ventricular size. Right ventricle global systolic function is mildly reduced . The left atrium is moderately dilated. Severe posterior mitral annular calcification. Focal calcification mitral valve leaflets. Chordal calcification is present.  A mobile well defined 1.0 cm by 0.5 cm echo density is noted on the ventricular aspect of the mitral valve suggestive of a fibroelastoma: clinical correlation is needed. No mitral stenosis. Mild mitral regurgitation. Mild to moderate tricuspid regurgitation. Moderate pulmonary hypertension. Aortic root is sclerotic and calcified. 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 It is a large vessel without any disease. There is mild distal calcification. It bifurcates into left anterior descending artery and left circumflex artery. The left anterior descending artery has moderate-to-severe proximal and ostial calcifications. There is 20% ostial disease. The rest of the vessel is mildly diffusely diseased. There is patent stent in the mid segment. The LAD gives off a few small diagonal branches. The second one is totally occluded. The rest are mildly diseased. The left circumflex artery is a large vessel that has total occlusion after the takeoff of the second OM branch. The first OM branch is a mid size vessel that is mildly diseased. The second one is the small that has chronic total occlusion with a faint left-to-left filling collaterals. The right coronary artery has inferior takeoff. There are stents in the mid and proximal segments. There is 80% in-stent stenosis in the mid segment.   The rest of the vessel has luminal irregularities. It gives off good size right PDA and good size right PLV that has no significant CAD noted. There is significant ostial RCA also noted as evident by damping of pressure on engagement and lack of contrast reflux. IMPRESSION: Mild disease involving left anterior descending artery with a patent stent in the mid segment. Totally occluded left circumflex artery. Total occlusion of the second OM branch with left-to-left collaterals. Significant ostial RCA and significant mid RCA disease as mentioned above. RECOMMENDATIONS:  Continue current treatment as per CT Surgery. Per CTS, she was scheduled for a CABG x 2 with a saphenous vein graft to the RCA and a saphenous vein graft to the circumflex and excision of mitral valve mass  January 3, 2022 osteomyelitis left middle finger left hand, January 20, 2022 amputation left third digit distal phalanx for osteomyelitis (open heart postponed) patient stopped taking aspirin at that time but was only of Brilinta a day or 2  Hospitalization April 15 through April 22, 2022 for osteomyelitis of the lumbar spine now on suppressive doxycycline  KIARA 4/19/2022 [facility]ard):  LV systolic function mildly reduced. Ejection fraction is visually estimated at 40-45%. Moderate mitral annular calcification. Valvular and subvalvular calcification. Mild mitral regurgitation. No definitive evidence of papillary fibroelastoma. Non-ST elevation MI cath / PCI 5/5/22 ( Dr. Nathaly Pedro ): . In-stent stenosis of mid RCA with successful balloon  angioplasty (pre-PCI LATOSHA-3 flow, post-PCI LATOSHA-3 flow, pre-PCI stenosis 90%, post-PCI stenosis less than 10%). 2.  Patent stent in the LAD. 3.  Totally occluded left circumflex artery.     Discharged to SNF for 6 weeks of antibiotics  Hospital admission June 16, 2022 with pneumonia, altered mental status and elevated troponins but no acute EKG changes and troponin elevation not consistent with acute coronary syndrome, discharged June 27  Chronic myocardial injury and troponin elevation and other troponins have been as high as 1388 and lowest 185  Dysphagia  Mediport with skin erosion June 27, 2022 -- port with catheter removed, intact and overlying skin ischemic  History of symptomatic sinus bradycardia during July 2022 admission -- no indication for PPM per Dr. Alhaji Husainp.   HR in the 40s at times, no high grade AVB      PAST SURGICAL HISTORY:    Past Surgical History:   Procedure Laterality Date    AMPUTATION      right great toe    ANKLE SURGERY      correction on charcot joint of right ankle    BONE BIOPSY N/A 04/18/2022    BONE BIOPSY PERCUTANEOUS L1/L2 performed by Ziggy Herrera MD at [address] [facility]  12/09/2021    Dr Dominick Hoyos Left 03/17/2020    LEFT CARPAL TUNNEL RELEASE performed by Patricia Cardenas MD at [address]      bilateral    CHOLECYSTECTOMY      COLONOSCOPY      CORONARY ANGIOPLASTY  05/05/2022    Dr. Sofya Ellis - RCA angioplasty    [address] Left 01/31/2018    upper arm/Dr. Weston Newton    ECHO COMPL W DOP COLOR FLOW  02/14/2013         ECHO COMPLETE  09/17/2013         FINGER AMPUTATION Left 01/20/2022    AMPUTATION OF LEFT THIRD DIGIT, DISTAL PHALANX performed by Cortes Magallanes MD at [facility]      right    OTHER SURGICAL HISTORY  09/27/2011    PPV, membranectomy, laser Right eye    OTHER SURGICAL HISTORY  insertion lumbar drain insertion    10/12/`14    OTHER SURGICAL HISTORY  10/22/2015    percutaneous lead placement for spinal cord stimulator    OTHER SURGICAL HISTORY  11/03/2015    Spinal; cord stimulator- turned off as of 3-10-20     PORT SURGERY N/A 6/27/2022    PORT REMOVAL performed by Jorje Johnson MD at [facility] AV ANAST,UP ARM BASILIC VEIN TRANSPOSIT Left 05/15/2018    TRANSPOSITION STAGE II AV FISTULA - LEFT UPPER ARM performed by Una Andrew MD at [address] Left 09/25/2018 SUPERFICIALIZATION AV FISTULA - LEFT ARM performed by Greer Reinoso MD at [address] RPR RETINAL [place] W/VITRECTOMY ANY METH Left 04/10/2018    PARS PLANA VITRECTOMY 25 GAUGE RETINAL DETACHMENT REPAIR air fluid exchange, endolaser performed by Lizette Childs MD at [address]      L2    TRANSESOPHAGEAL ECHOCARDIOGRAM  11/11/2021    Dr. Drew Swenson    TRANSESOPHAGEAL ECHOCARDIOGRAM  04/19/2022        TUNNELED VENOUS CATHETER PLACEMENT  11/15/2017    VITRECTOMY Left 04/10/2018    PARS PLANA VITRECTOMY; RETINAL DETACHMENT REPAIR; GAS BUBBLE; LASER LEFT EYE       HOME MEDICATIONS:  Prior to Admission medications    Medication Sig Start Date End Date Taking? Authorizing Provider   atorvastatin (LIPITOR) 80 MG tablet Take 80 mg by mouth at bedtime   Yes Historical Provider, MD   bisacodyl (DULCOLAX) 10 MG suppository Place 10 mg rectally daily as needed for Constipation   Yes Historical Provider, MD   gabapentin (NEURONTIN) 100 MG capsule Take 100 mg by mouth in the morning and 100 mg at noon and 100 mg before bedtime. Yes Historical Provider, MD   magnesium hydroxide (MILK OF MAGNESIA) 400 MG/5ML suspension Take 30 mLs by mouth daily as needed for Constipation   Yes Historical Provider, MD   vancomycin (VANCOCIN) 1000 MG/200ML SOLN Infuse 1,000 mg intravenously every 72 hours   Yes Historical Provider, MD   ondansetron (ZOFRAN) 4 MG tablet Take 4 mg by mouth every 6 hours as needed for Nausea or Vomiting   Yes Historical Provider, MD   sertraline (ZOLOFT) 25 MG tablet Take 25 mg by mouth in the morning.  7/26/22 8/2/22 Yes Historical Provider, MD   polyethylene glycol (GLYCOLAX) 17 g packet Take 17 g by mouth in the morning. 7/22/22 8/21/22  Juan Antonio Baird MD   senna (SENOKOT) 8.6 MG tablet Take 1 tablet by mouth nightly as needed for Constipation 7/21/22 8/20/22  Juan Antonio Baird MD   brimonidine (ALPHAGAN) 0.2 % ophthalmic solution Place 1 drop into the right eye in the morning and 1 drop before bedtime. 7/21/22   Trav Lynne MD   midodrine (PROAMATINE) 10 MG tablet Take 1 tablet by mouth in the morning and 1 tablet at noon and 1 tablet in the evening. Take with meals. 7/21/22   Trav Lynne MD   acetaminophen (TYLENOL) 325 MG tablet Take 650 mg by mouth every 4 hours as needed for Pain    Historical Provider, MD   Acidophilus Lactobacillus CAPS Take 1 capsule by mouth every morning    Historical Provider, MD   aspirin 81 MG EC tablet Take 81 mg by mouth every morning    Historical Provider, MD   meclizine (ANTIVERT) 12.5 MG tablet Take 12.5 mg by mouth every 8 hours as needed for Dizziness    Historical Provider, MD   mirtazapine (REMERON) 7.5 MG tablet Take 7.5 mg by mouth nightly    Historical Provider, MD   oxyCODONE-acetaminophen (PERCOCET) 5-325 MG per tablet Take 2 tablets by mouth every 8 hours as needed for Pain.     Historical Provider, MD   ZINC OXIDE, TOPICAL, ([address] [place] [address]) 10 % CREA Apply 1 Dose topically 2 times daily Apply to buttocks    Historical Provider, MD   bumetanide (BUMEX) 2 MG tablet Take 2 mg by mouth See Admin Instructions Given Sunday,Tuesday,Thursday,    Historical Provider, MD   lidocaine-prilocaine (EMLA) 2.5-2.5 % cream Apply 1 Dose topically See Admin Instructions Given Hair Mcdaniels prior to Dialysis    Historical Provider, MD   metoprolol succinate (TOPROL XL) 25 MG extended release tablet Take 1 tablet by mouth daily 11/24/21 7/21/22  Consuelo Eason MD   allopurinol (ZYLOPRIM) 100 MG tablet Take 1 tablet by mouth daily 9/7/21   Consuelo Eason MD   ticagrelor (BRILINTA) 90 MG TABS tablet Take 1 tablet by mouth 2 times daily 9/7/21   Consuelo Eason MD   lanthanum (FOSRENOL) 1000 MG chewable tablet Take 1,000 mg by mouth 3 times daily (with meals)  8/9/21 7/21/22  Historical Provider, MD   B Complex-C-Folic Acid (BONI CAPS) 1 MG CAPS Take 1 mg by mouth nightly     Historical Provider, MD   omeprazole (PRILOSEC) 20 MG delayed release capsule Take 20 mg by mouth daily as needed (gerd)    Historical Provider, MD MCHUGH 0.01 % SOLN ophthalmic drops Place 1 drop into the right eye nightly  6/9/20   Historical Provider, MD       CURRENT MEDICATIONS:      Current Facility-Administered Medications:     insulin lispro (HUMALOG) injection vial 0-4 Units, 0-4 Units, SubCUTAneous, TID WC, Ciara Cook MD, 2 Units at 08/06/22 0917    insulin lispro (HUMALOG) injection vial 0-4 Units, 0-4 Units, SubCUTAneous, Nightly, Ciara Cook MD    folic acid injection 1 mg, 1 mg, IntraVENous, Daily, Ciara Cook MD, 1 mg at 08/06/22 8656    epoetin fani-epbx (RETACRIT) injection 2,000 Units, 2,000 Units, SubCUTAneous, Once per day on Mon Wed Fri, Ciara Cook MD, 2,000 Units at 08/06/22 0630    vasopressin 20 Units in dextrose 5 % 100 mL infusion, 0.01-0.03 Units/min, IntraVENous, Continuous, Amy Phillips DO, Stopped at 08/05/22 1249    ascorbic acid (VITAMIN C) tablet 500 mg, 500 mg, Oral, Daily, Amy Phillips DO, 500 mg at 08/06/22 0901    zinc sulfate (ZINCATE) capsule 50 mg, 50 mg, Oral, Daily, Amy Phillips DO, 50 mg at 08/06/22 0901    thiamine (B-1) injection 250 mg, 250 mg, IntraVENous, Daily, Amy Phillips DO, 250 mg at 08/06/22 0914    meropenem (MERREM) 1,000 mg in sodium chloride 0.9 % 100 mL IVPB (mini-bag), 1,000 mg, IntraVENous, Q24H, Caitlyn Ulloa MD, Stopped at 08/05/22 1922    [COMPLETED] fluconazole (DIFLUCAN) in 0.9 % sodium chloride IVPB 400 mg, 400 mg, IntraVENous, Once, Stopped at 08/05/22 1815 **FOLLOWED BY** fluconazole (DIFLUCAN) 200 mg IVPB, 200 mg, IntraVENous, Q24H, Mickey Mgauire MD    vancomycin (VANCOCIN) intermittent dosing (placeholder), , Other, RX Ivonne, Caitlyn Ulloa MD    pantoprazole (PROTONIX) 40 mg in sodium chloride (PF) 10 mL injection, 40 mg, IntraVENous, Daily, Amy Phillips DO, 40 mg at 08/06/22 0901    chlorhexidine (PERIDEX) 0.12 % solution 15 mL, 15 mL, Mouth/Throat, BID, Diana Lopez DO, 15 mL at 08/06/22 0900    [Held by provider] aspirin chewable tablet 81 mg, 81 mg, Oral, Daily, Mickey Mejia MD, 81 mg at 08/05/22 0939    perflutren lipid microspheres (DEFINITY) injection 1.65 mg, 1.5 mL, IntraVENous, ONCE PRN, Mickey Mejia MD    fentaNYL 10 mcg/ml in 0.9%  ml infusion,  mcg/hr, IntraVENous, Continuous, Rachel Gonzales MD, Last Rate: 5 mL/hr at 08/06/22 0618, 50 mcg/hr at 08/06/22 0618    glucose chewable tablet 16 g, 4 tablet, Oral, PRN, Pedro Bethea MD    dextrose bolus 10% 125 mL, 125 mL, IntraVENous, PRN, Stopped at 08/03/22 1003 **OR** dextrose bolus 10% 250 mL, 250 mL, IntraVENous, PRN, Pedro Bethea MD    glucagon (rDNA) injection 1 mg, 1 mg, SubCUTAneous, PRN, Pedro Bethea MD    dextrose 10 % infusion, , IntraVENous, Continuous PRN, Pedro Bethea MD    [facility]] midodrine (PROAMATINE) tablet 10 mg, 10 mg, Oral, TID WC, Pedro Bethea MD, 10 mg at 08/04/22 0849    norepinephrine (LEVOPHED) 16 mg in dextrose 5% 250 mL infusion, 1-100 mcg/min, IntraVENous, Continuous, Soo Freedman DO, Last Rate: 7.5 mL/hr at 08/06/22 1015, 8 mcg/min at 08/06/22 1015    doxycycline hyclate (VIBRAMYCIN) capsule 100 mg, 100 mg, Oral, 2 times per day, Lars Pulido MD, 100 mg at 08/06/22 0901    gabapentin (NEURONTIN) capsule 100 mg, 100 mg, Oral, Nightly, Mickey Mejia MD, 100 mg at 08/05/22 2034    hydrocortisone sodium succinate PF (SOLU-CORTEF) injection 100 mg, 100 mg, IntraVENous, Q8H, Diana Lopez DO, 100 mg at 08/06/22 0901    white petrolatum ointment, , Topical, TID PRN, Oksana Medici, MD    sodium chloride flush 0.9 % injection 5-40 mL, 5-40 mL, IntraVENous, 2 times per day, Pedro Bethea MD, 10 mL at 08/06/22 0902    sodium chloride flush 0.9 % injection 5-40 mL, 5-40 mL, IntraVENous, PRN, Pedro eBthea MD    0.9 % sodium chloride infusion, , IntraVENous, PRN, Pedro Bethea MD    ondansetron (ZOFRAN-ODT) disintegrating tablet 4 mg, 4 mg, Oral, Q8H PRN **OR** ondansetron (ZOFRAN) injection 4 mg, 4 mg, IntraVENous, Q6H PRN, Traci Anne MD    polyethylene glycol [facility]) packet 17 g, 17 g, Oral, Daily PRN, Traci Anne MD    acetaminophen (TYLENOL) tablet 650 mg, 650 mg, Oral, Q6H PRN **OR** acetaminophen (TYLENOL) suppository 650 mg, 650 mg, Rectal, Q6H PRN, Traci Anne MD    allopurinol (ZYLOPRIM) tablet 100 mg, 100 mg, Oral, Daily, GOYO Posada CNP, 100 mg at 08/06/22 0901    atorvastatin (LIPITOR) tablet 80 mg, 80 mg, Oral, Nightly, GOYO Posada CNP, 80 mg at 08/05/22 2034    brimonidine (ALPHAGAN) 0.2 % ophthalmic solution 1 drop, 1 drop, Right Eye, BID, GOYO Posada CNP, 1 drop at 08/06/22 0902    [Held by provider] bumetanide (BUMEX) tablet 2 mg, 2 mg, Oral, Once per day on Sun Tue Thu, GOYO Posada CNP    lidocaine-prilocaine (EMLA) cream 1 Dose, 1 Dose, Topical, See Admin Instructions, GOYO Posada CNP    latanoprost (XALATAN) 0.005 % ophthalmic solution 1 drop, 1 drop, Both Eyes, Nightly, GOYO Posada CNP, 1 drop at 08/05/22 2034    [Held by provider] oxyCODONE-acetaminophen (PERCOCET) 5-325 MG per tablet 2 tablet, 2 tablet, Oral, Q8H PRN, GOYO Posada CNP    [Held by provider] sertraline (ZOLOFT) tablet 25 mg, 25 mg, Oral, Daily, GOYO Posada CNP, 25 mg at 08/03/22 0959    [Held by provider] ticagrelor (BRILINTA) tablet 90 mg, 90 mg, Oral, BID, GOYO Posada CNP, 90 mg at 08/05/22 2046    white petrolatum ointment, , Topical, BID, Cristel Kong MD, Given at 08/06/22 0901    [Held by provider] heparin (porcine) injection 5,000 Units, 5,000 Units, SubCUTAneous, Q8H, GOYO Posada CNP, 5,000 Units at 08/05/22 2034      ALLERGIES:  Furosemide    SOCIAL HISTORY:    Unable to obtain at this time due to patient being sedated and intubated      FAMILY HISTORY:   Unable to obtain at this time due to patient being sedated and intubated      REVIEW OF SYSTEMS:     Unable to obtain at this time due to patient being sedated and intubated      PHYSICAL EXAM:   BP (!) 127/54   Pulse 72   Temp 97.4 °F (36.3 °C) (Axillary)   Resp 14   Ht 5' 10\" (1.778 m)   Wt 144 lb 10 oz (65.6 kg)   SpO2 100%   BMI 20.75 kg/m²   CONST:  Well developed, well nourished AAF who appears stated age. Sedated and intubated  HEENT:   Head- Normocephalic, atraumatic. Neck-  No stridor, trachea midline, difficult to assess for JVD  CHEST: Chest symmetrical. No accessory muscle use or intercostal retractions. RESPIRATORY: Lung sounds - mechanically ventilated  CARDIOVASCULAR:     No noted carotid bruit. Heart Ausculation- Regular rate and rhythm, no apparent murmur  PV: No lower extremity edema. Pedal pulses palpable, no clubbing or cyanosis. ABDOMEN: Soft, NG tube present. SKIN: Warm and dry. NEURO / PSYCH: Sedated and intubated    DATA:    Telemetry: SR with HR in the 70s, PVCs (episodes of trigeminy)    Diagnostic:  All diagnostic testing and lab work thus far this admission reviewed in detail. CT Head 8/1/2022  Impression  Small hypodensity right jerardo not demonstrated previously. Acute or subacute  lacunar infarct at this location cannot be excluded. This finding could be  more fully evaluated with MRI. Chest CT 8/1/2022  Impression  Extensive anasarca seen throughout the soft tissues the chest abdomen and  pelvis. Irregularity identified at the site of the prior ruth catheter  along the right anterior chest wall with small radiopaque density seen in the  superior soft tissues suggesting possibly calcification or radiopaque foreign  body. Small bilateral pleural effusions with atelectatic changes seen at the lung  bases bilaterally or infiltrate. Chronic osteomyelitis involving L1 and L2 with hardware seen within the lower  lumbar spine. There is no evidence of acute intra-or pelvic abnormality.   Extensive vascular calcifications seen throughout the thoracic and abdominal  aorta and mesenteric and iliac vessels. CT Head 8/4/2022  Impression  Stable small hypodensity in the right jerardo, which could represent infarct. However, this should be confirmed with MRI. No new acute intracranial process identified. CT Chest 8/5/2022  Impression  A left subclavian catheter which is looped in the subclavian vein with the  tip at the level of innominate vein with the adjacent air bubbles as noted. There is also hematoma in the left pectoralis muscle and axilla. Persistent atelectasis/infiltrates and pleural effusion in the lung bases. Diffuse anasarca. Chronic osteomyelitis discitis complex at L1-L2, with an indwelling spinal  stimulator. Severe degenerative changes of the right shoulder with the joint effusion. Indeterminate cystic lesion in the tail of the pancreas. Consider  surveillance. TTE (Dr. Chay Beckwith, 8/5/2022): LVEF 65%. No WMA. Severe LVH. Mild TR. Pulmonary hypertension, RVSP 44. No PFO. No significant pericardial effusion. CT Chest and Abdomen/Pelvis 8/6/2022  Impression  Stable abnormal chest with the left subclavian central line looped in the  subclavian vein as noted with persistent hematoma in the left chest wall and  axilla with the fluid levels layering as noted without significant change. There is diffuse anasarca. Infiltrates and pleural effusion lung bases likely CHF or pneumonia. Severe degenerative changes in the thoracolumbar spine with the erosion of  L1-L2 as before likely due to osteomyelitis/ discitis complex. There is also  severe degenerative changes and joint effusion in the right shoulder and  nonunion fracture of the body of the sternum. Dilated fluid-filled small bowel loops and colon with liquid stool likely  ileus or enterocolitis. Nonspecific cystic lesions in the pancreas which are indeterminate and  surveillance recommended.   Anasarca with hyperdense lesion in the left upper arm. Intake/Output Summary (Last 24 hours) at 8/6/2022 1407  Last data filed at 8/6/2022 0618  Gross per 24 hour   Intake 1071.78 ml   Output 100 ml   Net 971.78 ml       Labs:   CBC:   Recent Labs     08/05/22  2103 08/05/22  2327 08/06/22  0402 08/06/22  0840   WBC 9.3  --  8.3  --    HGB 6.4*   < > 5.9* 5.5*   HCT 19.1*   < > 17.3* 16.4*   *  --  101*  --     < > = values in this interval not displayed. BMP:   Recent Labs     08/05/22 1935 08/06/22  0840   * 129*   K 4.5 4.4   CO2 18* 20*   BUN 24* 29*   CREATININE 2.9* 3.3*   LABGLOM 20 17   CALCIUM 8.8 9.2     Mag:   Recent Labs     08/05/22 1935 08/06/22  0840   MG 1.9 1.9     Phos:   Recent Labs     08/05/22  1935 08/06/22  0840   PHOS 3.5 3.3     HgA1c:   Lab Results   Component Value Date    LABA1C 5.1 08/04/2022       FASTING LIPID PANEL:  Lab Results   Component Value Date/Time    CHOL 92 08/02/2022 06:25 AM    HDL 45 08/04/2022 04:31 AM    LDLCALC 25 08/04/2022 04:31 AM    TRIG 66 08/02/2022 06:25 AM     Component 8/1/22 1243    Culture, Blood 2 24 Hours no growth P      REPEAT BLOOD CULTURES PENDING    Component Ref Range & Units 8/1/22 1243 6/15/22 1853   Ammonia 11.0 - 51.0 umol/L <10.0 Low   10.1 Low       Component Ref Range & Units 8/2/22 0115 7/11/22 1048 4/13/22 0717 11/16/17 0610 11/15/17 1935   Procalcitonin 0.00 - 0.08 ng/mL 0.28 High          Component 8/3/22 1839    Organism Proteus mirabilis Abnormal     WOUND/ABSCESS Moderate growth    Organism Serratia marcescens Abnormal     WOUND/ABSCESS Heavy growth          Assessment and plan to follow as per Dr. Star Mathias. NOTE: This report was transcribed using voice recognition software. Every effort was made to ensure accuracy; however, inadvertent computerized transcription errors may be present.       [facility], [address] [place] [address], [facility]    Electronically signed by Tina Read PA-C on 8/6/2022 at 2:07 PM

## 2022-08-06 NOTE — PROGRESS NOTES
[facility]   Progress note      Subjective:      Remains intubated, but awake today on fentanyl and Levophed/vasopressin  Does not track     Objective:    BP (!) 127/54   Pulse 74   Temp 97 °F (36.1 °C) (Axillary)   Resp 14   Ht 5' 10\" (1.778 m)   Wt 144 lb 10 oz (65.6 kg)   SpO2 98%   BMI 20.75 kg/m²     In: 1257.3 [I.V.:475.9]  Out: 100   In: 1257.3   Out: 100   Intubated eyes open   HEENT: AT/NC, MMM  Neck: FROM, supple    Lungs: Clear to auscultation  CV: RRR, no MRGs  Vasc: Radial pulses 2+ right-sided in place, purulent drainage  Abdomen: Soft, non-tender; no masses or HSM  Extremities: No peripheral edema or digital cyanosis  Skin: no rash, lesions or ulcers       Recent Labs     08/05/22  0408 08/05/22  2103 08/05/22  2327 08/06/22  0227 08/06/22  0402 08/06/22  0840   WBC 8.4 9.3  --   --  8.3  --    HGB 9.8* 6.4*   < > 5.7* 5.9* 5.5*   HCT 29.4* 19.1*   < > 16.8* 17.3* 16.4*    106*  --   --  101*  --     < > = values in this interval not displayed. Recent Labs     08/05/22  0408 08/05/22  1935 08/06/22  0840    131* 129*   K 4.9 4.5 4.4    100 97*   CO2 17* 18* 20*   BUN 18 24* 29*   CREATININE 2.6* 2.9* 3.3*   CALCIUM 9.1 8.8 9.2       Assessment:    Principal Problem:    Stroke, lacunar (HCC)  Active Problems:    Acute CVA (cerebrovascular accident) ([facility] [address]    CKD (chronic kidney disease) stage 3, GFR 30-59 ml/min (HCC)  Resolved Problems:    * No resolved hospital problems. *      Plan:    61-year-old female with complicated past medical history including but not limited to-ESRD, osteomyelitis, coronary artery disease, previous strokes, CHF presents to the ED with strokelike symptoms and is admitted to telemetry unit with      Sepsis/shock/respiratory failure?   Intubated 8/2/2022 secondary to worsening confusion and respiratory failure  Vent management per ICU team  Transferred to ICU setting 8-2-22 after rapid response team was called for hypotension  Pancultures pending-negative to date  On suppressive Doxy therapy for osteomyelitis of spine-infectious disease following, input appreciated  Patient also has a large decubitus ulcer-Proteus mirabilis and Serratia  Right-sided port has been removed, no purulence at the site noted today  Vanc/Merrem/Diflucan pending pan culture results empirically  Fecal management system in place now per ID request secondary to patient having feces interfering with lines  and with an open large decubitus ulcer     Acute/subacute lacunar infarct right jerardo  -MRI brain without contrast pending  -Aspirin 81 mg Lipitor 40 mg daily  -Consult neurology -input appreciated  -Check lipid panel and hemoglobin A1c needs tight control of risk factors.   -Monitor blood pressure-adjust medications as needed.  -Echocardiogram with ejection fraction 65%, no PFO     ESRD/anemia-5.5  Transfuse PRBCs to keep greater than 7  General surgery evaluation secondary to acute anemia   -Dialysis MWF  -Consult nephrology  -Monitor labs  -Discontinue IV fluids as patient is already markedly volume overloaded     Mild elevation in ALT AST  Continue to monitor daily  No evidence of Daly sign right upper quadrant     DVT Prophylaxis   PT/OT  Discharge planning     Case discussed with Dr. Sheri Najera disease 8/5/ 22      Aden Galeano MD  1:01 PM  8/6/2022

## 2022-08-06 NOTE — PROGRESS NOTES
Per Dr. Marco Hodgeona labs not required due to patient not having dialysis today and trying to minimize lab draws because of low hemoglobin.

## 2022-08-06 NOTE — PLAN OF CARE
Went to assess patient. Having procedure done at bedside at this time. EEG with a moderate to severe encephalopathy. No epileptiform activity. AMS is multifactorial due to a metabolic encephalopathy. The acute/subacute infarct in the jerardo is not likely contributing a great deal. In regards to the stroke, resume AP therapy once medically able. Continue statin. Echo unrevealing. Unfortunately, unable to have MRI/MRA due to hardware. Unable to have CTA due to kidney fx. No further workup from a neurology perspective at this time. Neurology will be available if needed- please call with questions.      Jelani Caceres PA-C

## 2022-08-06 NOTE — PROGRESS NOTES
Nephrology Progress Note  Patient's Name: Paris Gamboa  11:14 AM  8/6/2022        Reason for Consult:  ESRD  Requesting Physician:  Yasmin Corey DO    Chief Complaint:  hypotension, altered mental status  History Obtained From:  EHR    History of Present Ilness:    Paris Gamboa is a 78 y.o. female with a history of ESRD HD dependent, vertebral osteomyelitis ( s/p antibiotic treatment ) on suppressive antibiotic therapy with doxycycline, CAD, right breast cancer( s/p chemotx ; s/p mastectomy ) ) and several other medical problems listed below. Patient was observed to be slow to response following her hemodialysis treatment at the facility. She was seen in the ED for evaluation. No history of a fall. Initial evaluation in the ED revealed patient have a blood pressure 134/87, pulse of 56 and a temperature of 97.9. She was noted to have intermittent hypotensive episodes. Laboratory data showed WBC 2.5, hemoglobin 8.1, platelet count 49,700. CHEM profile was consistent with her ESRD status. Patient was admitted to telemetry for continuation of care. Patient developed marked hypotension last evening. RRT was called. Initial blood pressure noted to be 80/40. She was given 250 cc normal saline bolus. Subsequently transferred to MICU for further management.     Subjective    8/4: worsening mental status last pm , requiring intubation  8/5: Remains intubated; worsening hypotension; pressors being increased  8/6: pt seen in icu, remains intubated and sedated    Allergies:  Furosemide    Current Medications:    insulin lispro (HUMALOG) injection vial 0-4 Units, TID WC  insulin lispro (HUMALOG) injection vial 0-4 Units, Nightly  folic acid injection 1 mg, Daily  epoetin fani-epbx (RETACRIT) injection 2,000 Units, Once per day on Mon Wed Fri  vasopressin 20 Units in dextrose 5 % 100 mL infusion, Continuous  ascorbic acid (VITAMIN C) tablet 500 mg, Daily  zinc sulfate (ZINCATE) capsule 50 mg, Daily  thiamine (B-1) injection 250 mg, Daily  meropenem (MERREM) 1,000 mg in sodium chloride 0.9 % 100 mL IVPB (mini-bag), Q24H  fluconazole (DIFLUCAN) 200 mg IVPB, Q24H  vancomycin (VANCOCIN) intermittent dosing (placeholder), RX Placeholder  pantoprazole (PROTONIX) 40 mg in sodium chloride (PF) 10 mL injection, Daily  chlorhexidine (PERIDEX) 0.12 % solution 15 mL, BID  [Held by provider] aspirin chewable tablet 81 mg, Daily  perflutren lipid microspheres (DEFINITY) injection 1.65 mg, ONCE PRN  fentaNYL 10 mcg/ml in 0.9%  ml infusion, Continuous  glucose chewable tablet 16 g, PRN  dextrose bolus 10% 125 mL, PRN   Or  dextrose bolus 10% 250 mL, PRN  glucagon (rDNA) injection 1 mg, PRN  dextrose 10 % infusion, Continuous PRN  [Held by provider] midodrine (PROAMATINE) tablet 10 mg, TID WC  norepinephrine (LEVOPHED) 16 mg in dextrose 5% 250 mL infusion, Continuous  doxycycline hyclate (VIBRAMYCIN) capsule 100 mg, 2 times per day  gabapentin (NEURONTIN) capsule 100 mg, Nightly  hydrocortisone sodium succinate PF (SOLU-CORTEF) injection 100 mg, Q8H  white petrolatum ointment, TID PRN  sodium chloride flush 0.9 % injection 5-40 mL, 2 times per day  sodium chloride flush 0.9 % injection 5-40 mL, PRN  0.9 % sodium chloride infusion, PRN  ondansetron (ZOFRAN-ODT) disintegrating tablet 4 mg, Q8H PRN   Or  ondansetron (ZOFRAN) injection 4 mg, Q6H PRN  polyethylene glycol (GLYCOLAX) packet 17 g, Daily PRN  acetaminophen (TYLENOL) tablet 650 mg, Q6H PRN   Or  acetaminophen (TYLENOL) suppository 650 mg, Q6H PRN  allopurinol (ZYLOPRIM) tablet 100 mg, Daily  atorvastatin (LIPITOR) tablet 80 mg, Nightly  brimonidine (ALPHAGAN) 0.2 % ophthalmic solution 1 drop, BID  [Held by provider] bumetanide (BUMEX) tablet 2 mg, Once per day on Sun Tue Thu  lidocaine-prilocaine (EMLA) cream 1 Dose, See Admin Instructions  latanoprost (XALATAN) 0.005 % ophthalmic solution 1 drop, Nightly  [Held by provider] oxyCODONE-acetaminophen (PERCOCET) 5-325 MG per tablet 2 tablet, Q8H PRN  [Held by provider] sertraline (ZOLOFT) tablet 25 mg, Daily  [Held by provider] ticagrelor (BRILINTA) tablet 90 mg, BID  white petrolatum ointment, BID  [Held by provider] heparin (porcine) injection 5,000 Units, Q8H      Review of Systems:   Review of systems not obtained due to patient factors. Physical exam:  Vitals:    08/06/22 0916   BP:    Pulse: 68   Resp:    Temp:    SpO2: 100%          General: intubated, sedated  Eyes: PERRL. No sclera icterus. No conjunctival injection. ENT: No discharge. Pharynx clear. Neck: Trachea midline. Normal thyroid. Lungs: No accessory muscle use. few scattered rhonchi  Chest: R upper chest wall small wound, dressing applied  CV: Regular rate. Regular rhythm. No murmur or rub. .   Abd: Non-tender. Non-distended. No masses. No organmegaly. Normal bowel sounds. Skin: Warm and dry. No nodule on exposed extremities. No rash on exposed extremities.   Ext: bilateral UE edema; RUE sleeve, +lymphedema; LUE AVFgood thrill and bruit  Neuro: lethargic, opens eyes to name, follows some simple commands      Data:   Labs:  Lab Results   Component Value Date     (L) 08/06/2022     (L) 08/05/2022     08/05/2022    K 4.4 08/06/2022    K 4.5 08/05/2022    K 4.9 08/05/2022    CL 97 (L) 08/06/2022    CO2 20 (L) 08/06/2022    CO2 18 (L) 08/05/2022    CO2 17 (L) 08/05/2022    CREATININE 3.3 (H) 08/06/2022    CREATININE 2.9 (H) 08/05/2022    CREATININE 2.6 (H) 08/05/2022    BUN 29 (H) 08/06/2022    BUN 24 (H) 08/05/2022    BUN 18 08/05/2022    GLUCOSE 297 (H) 08/06/2022    GLUCOSE 330 (H) 08/05/2022    GLUCOSE 257 (H) 08/05/2022    PHOS 3.3 08/06/2022    PHOS 3.5 08/05/2022    PHOS 2.5 08/04/2022    WBC 8.3 08/06/2022    WBC 9.3 08/05/2022    WBC 8.4 08/05/2022    HGB 5.5 (LL) 08/06/2022    HGB 5.9 (LL) 08/06/2022    HGB 5.7 (LL) 08/06/2022    HCT 16.4 (L) 08/06/2022    HCT 17.3 (L) 08/06/2022    HCT 16.8 (L) 08/06/2022    MCV 81.6 08/06/2022     (L) 08/06/2022         Imaging:  CT Head WO Contrast    Result Date: 8/1/2022  EXAMINATION: CT OF THE HEAD WITHOUT CONTRAST  8/1/2022 1:09 pm TECHNIQUE: CT of the head was performed without the administration of intravenous contrast. Automated exposure control, iterative reconstruction, and/or weight based adjustment of the mA/kV was utilized to reduce the radiation dose to as low as reasonably achievable. COMPARISON: 07/31/2022 noncontrast head CT. HISTORY: ORDERING SYSTEM PROVIDED HISTORY: altered mental status TECHNOLOGIST PROVIDED HISTORY: Reason for exam:->altered mental status Has a \"code stroke\" or \"stroke alert\" been called? ->No Decision Support Exception - unselect if not a suspected or confirmed emergency medical condition->Emergency Medical Condition (MA) What reading provider will be dictating this exam?->CRC FINDINGS: BRAIN/VENTRICLES: The ventricles are normal size, position and contour. Cortical sulci and sylvian fissures are mildly prominent. There are no masses or mass effect. There are no parenchymal hemorrhages or extra-axial fluid collections. There is a small hypodense area in the right jerardo not demonstrated previously. The cerebellum is unremarkable. ORBITS: The visualized portion of the orbits demonstrate no acute abnormality. SINUSES: The visualized paranasal sinuses and mastoid air cells demonstrate no acute abnormality. SOFT TISSUES/SKULL:  No definite fractures. There is a small left periorbital soft tissue contusion and hematoma, stable to slightly improved. Small hypodensity right jerardo not demonstrated previously. Acute or subacute lacunar infarct at this location cannot be excluded. This finding could be more fully evaluated with MRI.      XR CHEST PORTABLE    Result Date: 8/1/2022  EXAMINATION: ONE XRAY VIEW OF THE CHEST 8/1/2022 12:56 pm COMPARISON: Previous chest x-ray of 07/11/2022 and CT scan of 06/15/2022 HISTORY: ORDERING SYSTEM PROVIDED

## 2022-08-06 NOTE — PLAN OF CARE
Problem: Chronic Conditions and Co-morbidities  Goal: Patient's chronic conditions and co-morbidity symptoms are monitored and maintained or improved  8/6/2022 0023 by Becka Butt RN  Outcome: Progressing  8/5/2022 2121 by Becka Butt RN  Outcome: Progressing     Problem: Discharge Planning  Goal: Discharge to home or other facility with appropriate resources  8/6/2022 0023 by Becka Butt RN  Outcome: Progressing  8/5/2022 2121 by Becka Butt RN  Outcome: Progressing     Problem: Pain  Goal: Verbalizes/displays adequate comfort level or baseline comfort level  8/6/2022 0023 by Becka Butt RN  Outcome: Progressing  8/5/2022 2121 by Becka Butt RN  Outcome: Progressing     Problem: Safety - Adult  Goal: Free from fall injury  8/6/2022 0023 by Becka Butt RN  Outcome: Progressing  8/5/2022 2121 by Becka Butt RN  Outcome: Progressing     Problem: ABCDS Injury Assessment  Goal: Absence of physical injury  8/6/2022 0023 by Becka Butt RN  Outcome: Progressing  8/5/2022 2121 by Becka Butt RN  Outcome: Progressing     Problem: Skin/Tissue Integrity  Goal: Absence of new skin breakdown  Description: 1. Monitor for areas of redness and/or skin breakdown  2. Assess vascular access sites hourly  3. Every 4-6 hours minimum:  Change oxygen saturation probe site  4. Every 4-6 hours:  If on nasal continuous positive airway pressure, respiratory therapy assess nares and determine need for appliance change or resting period.   8/6/2022 0023 by Becka Butt RN  Outcome: Progressing  8/5/2022 2121 by Becka Butt RN  Outcome: Progressing     Problem: Cardiovascular - Adult  Goal: Maintains optimal cardiac output and hemodynamic stability  8/6/2022 0023 by Becka Butt RN  Outcome: Progressing  8/5/2022 2121 by Becka Butt RN  Outcome: Progressing     Problem: Metabolic/Fluid and Electrolytes - Adult  Goal: Hemodynamic stability and optimal renal function maintained  8/6/2022 0023 by Sagar Germain RN  Outcome: Progressing  8/5/2022 2121 by Sagar Germain RN  Outcome: Progressing     Problem: Nutrition Deficit:  Goal: Optimize nutritional status  8/6/2022 0023 by Sagar Germain RN  Outcome: Progressing  8/5/2022 2121 by Sagar Germain RN  Outcome: Progressing     Problem: Respiratory - Adult  Goal: Achieves optimal ventilation and oxygenation  8/6/2022 0023 by Sagar Germain RN  Outcome: Progressing  8/5/2022 2121 by Sagar Germain RN  Outcome: Progressing  8/5/2022 1609 by Roel Jay RCP  Outcome: Progressing  8/5/2022 1051 by Trung Meier RCP  Outcome: Progressing

## 2022-08-06 NOTE — PLAN OF CARE
Problem: Safety - Adult  Goal: Free from fall injury  Outcome: Progressing  Flowsheets (Taken 8/6/2022 0800)  Free From Fall Injury: Instruct family/caregiver on patient safety     Problem: ABCDS Injury Assessment  Goal: Absence of physical injury  Outcome: Progressing  Flowsheets (Taken 8/6/2022 0800)  Absence of Physical Injury: Implement safety measures based on patient assessment     Problem: Skin/Tissue Integrity  Goal: Absence of new skin breakdown  Description: 1. Monitor for areas of redness and/or skin breakdown  2. Assess vascular access sites hourly  3. Every 4-6 hours minimum:  Change oxygen saturation probe site  4. Every 4-6 hours:  If on nasal continuous positive airway pressure, respiratory therapy assess nares and determine need for appliance change or resting period.   Outcome: Progressing     Problem: Cardiovascular - Adult  Goal: Maintains optimal cardiac output and hemodynamic stability  Outcome: Progressing     Problem: Respiratory - Adult  Goal: Achieves optimal ventilation and oxygenation  Outcome: Progressing     Problem: Chronic Conditions and Co-morbidities  Goal: Patient's chronic conditions and co-morbidity symptoms are monitored and maintained or improved  Recent Flowsheet Documentation  Taken 8/6/2022 0800 by Kacy Estrada RN  Care Plan - Patient's Chronic Conditions and Co-Morbidity Symptoms are Monitored and Maintained or Improved:   Monitor and assess patient's chronic conditions and comorbid symptoms for stability, deterioration, or improvement   Collaborate with multidisciplinary team to address chronic and comorbid conditions and prevent exacerbation or deterioration   Update acute care plan with appropriate goals if chronic or comorbid symptoms are exacerbated and prevent overall improvement and discharge     Problem: Pain  Goal: Verbalizes/displays adequate comfort level or baseline comfort level  Recent Flowsheet Documentation  Taken 8/6/2022 0800 by Kacy Estrada RN  Verbalizes/displays adequate comfort level or baseline comfort level:   Encourage patient to monitor pain and request assistance   Assess pain using appropriate pain scale   Implement non-pharmacological measures as appropriate and evaluate response     Problem: Metabolic/Fluid and Electrolytes - Adult  Goal: Hemodynamic stability and optimal renal function maintained  Recent Flowsheet Documentation  Taken 8/6/2022 0800 by Syed Nuñez RN  Hemodynamic stability and optimal renal function maintained:   Monitor labs and assess for signs and symptoms of volume excess or deficit   Monitor intake, output and patient weight   Monitor response to interventions for patient's volume status, including labs, urine output, blood pressure (other measures as available)

## 2022-08-06 NOTE — PROGRESS NOTES
[address] [facility]  Department of Internal Medicine   Internal Medicine Residency   MICU Progress Note    Patient:  Jermaine García 78 y.o. female  MRN: [id number]     Date of Service: 2022    Allergy: Furosemide    Subjective     Patient is sedated and intubated. Patient is unable to respond to questions. Intubation day 4.    24h change:   Hb dropped to 6.4, patient family was contacted regarding receiving blood transfusion. Family declined. IV iron, EPO, folate, zinc, and vit C was given. Was also given Ddavp 20 mcg once,  GS attempted right subclavian without success. TEG graph was stone cold normal  Labs were held to minimize blood loss. CT chest reveal left subclavian coiled line and pectoralis hematoma.        Objective     VITAL SIGNS:  BP (!) 127/54   Pulse 74   Temp 97 °F (36.1 °C) (Axillary)   Resp 14   Ht 5' 10\" (1.778 m)   Wt 144 lb 10 oz (65.6 kg)   SpO2 96%   BMI 20.75 kg/m²   Tmax over 24 hours:  Temp (24hrs), Av.5 °F (36.4 °C), Min:96.8 °F (36 °C), Max:98.6 °F (37 °C)      Patient Vitals for the past 6 hrs:   BP Temp Temp src Pulse Resp SpO2   22 1200 (!) 127/54 -- -- 74 14 96 %   22 1155 -- -- -- 81 -- 100 %   22 1145 -- -- -- 74 -- 99 %   22 1130 -- -- -- 74 -- 100 %   22 1115 -- -- -- 74 -- 99 %   22 1100 -- -- -- 74 -- 99 %   22 1045 -- -- -- 76 -- 98 %   22 1030 -- -- -- 76 -- 100 %   22 1015 -- -- -- 69 -- 100 %   22 1000 (!) 137/52 -- -- 71 14 100 %   22 0945 -- -- -- 70 -- 100 %   22 0930 -- -- -- 68 -- 99 %   22 -- -- -- 66 -- 100 %   22 -- -- -- 67 -- 100 %   22 -- -- -- 67 -- 92 %   22 -- -- -- 68 -- 100 %   22 -- -- -- 69 -- 100 %   22 -- -- -- 89 -- 100 %   22 -- -- -- 100 -- 99 %   22 -- -- -- 75 -- 100 %   22 -- -- -- 74 -- 97 %   22 -- -- -- 72 -- 100 %   22 -- -- -- 82 -- 99 %   08/06/22 0800 (!) 131/54 97 °F (36.1 °C) Axillary 74 14 100 %   08/06/22 0745 -- -- -- 82 -- 100 %   08/06/22 0730 -- -- -- 71 -- 100 %   08/06/22 0715 -- -- -- 75 -- 100 %   08/06/22 0700 -- -- -- 72 -- 100 %           Intake/Output Summary (Last 24 hours) at 8/6/2022 1231  Last data filed at 8/6/2022 0618  Gross per 24 hour   Intake 1071.78 ml   Output 100 ml   Net 971.78 ml       Wt Readings from Last 2 Encounters:   08/05/22 144 lb 10 oz (65.6 kg)   07/31/22 200 lb (90.7 kg)     Body mass index is 20.75 kg/m². I & O - 24hr:   Intake/Output Summary (Last 24 hours) at 8/6/2022 1231  Last data filed at 8/6/2022 0618  Gross per 24 hour   Intake 1071.78 ml   Output 100 ml   Net 971.78 ml         Physical Exam:  General Appearance: Patient is intubated. RASS score -3  Neck: no adenopathy, no carotid bruit, supple, symmetrical, trachea midline, and thyroid not enlarged, symmetric, no tenderness/mass/nodules  Lung: clear to auscultation bilaterally  Heart: regular rate and rhythm, S1, S2 normal, no murmur, click, rub or gallop  Abdomen: soft, non-tender; bowel sounds normal; no masses,  no organomegaly  Extremities:  +1 bilateral edema   Musculoskeletal: No joint swelling, no muscle tenderness. Unable to assess ROM. Neurologic: Mental status: responds to pain by moving/nodding head   Skin: Right sided chest wound. Disc shaped wound about 2cm in diameter and depth. No erythema, swelling or discharge.      Lines     site day    Art line   R Femoral 4   TLC L Fem  Lef subclavian  6  2   PICC None    Hemoaccess Left arm       VENT SETTINGS (Comprehensive) (if applicable):  Vent Information  Ventilator ID: [id number]  Additional Respiratory Assessments  Heart Rate: 74  Resp: 14  SpO2: 96 %  Position: Semi-Vidal's  Humidification Source: Heated wire  Humidification Temp: 37  Circuit Condensation: Drained  Airway Type: ET  Airway Size: 7.5  Cuff Pressure (cm H2O): 29 cm H2O    ABGs:   Recent Labs     08/06/22  0857   PH 7.487*   PCO2 28.0*   PO2 180.3*   HCO3 20.7*   BE -2.4   O2SAT 99.4*         Laboratory findings:  Complete Blood Count:   Recent Labs     08/05/22 0408 08/05/22 2103 08/05/22 2327 08/06/22 0227 08/06/22 0402 08/06/22  0840   WBC 8.4 9.3  --   --  8.3  --    HGB 9.8* 6.4*   < > 5.7* 5.9* 5.5*   HCT 29.4* 19.1*   < > 16.8* 17.3* 16.4*    106*  --   --  101*  --     < > = values in this interval not displayed. Last 3 Blood Glucose:   Recent Labs     08/05/22 0408 08/05/22 1935 08/06/22  0840   GLUCOSE 257* 330* 297*          PT/INR:    Lab Results   Component Value Date/Time    PROTIME 11.0 08/01/2022 12:43 PM    PROTIME 10.4 02/15/2012 10:40 AM    INR 1.0 08/01/2022 12:43 PM     PTT:    Lab Results   Component Value Date/Time    APTT 45.6 08/01/2022 12:43 PM       Comprehensive Metabolic Profile:   Recent Labs     08/05/22 0408 08/05/22 1935 08/06/22  0840    131* 129*   K 4.9 4.5 4.4    100 97*   CO2 17* 18* 20*   BUN 18 24* 29*   CREATININE 2.6* 2.9* 3.3*   GLUCOSE 257* 330* 297*   CALCIUM 9.1 8.8 9.2        Magnesium:   Lab Results   Component Value Date/Time    MG 1.9 08/06/2022 08:40 AM     Phosphorus:   Lab Results   Component Value Date/Time    PHOS 3.3 08/06/2022 08:40 AM     Ionized Calcium:   Lab Results   Component Value Date/Time    CAION 1.22 11/08/2017 04:28 AM      Troponin: No results for input(s): TROPONINI in the last 72 hours. Medications     Infusions: (Fluid, Sedation, Vasopressors)    Levophed 8 mcg/min  Fentanyl 50 mcg/hr    Nutrition: NG tube feeding, renal formula - day 4      ATB:   Antibiotics  Days   Meropenem  1   Vibramycin 3   Vancomycin  Fluconazole      Cultures:     Chest wound culture positive for proteus mirabilis and Serratia       Imaging     CT ABDOMEN PELVIS WO CONTRAST Additional Contrast? None    Result Date: 8/3/2022  Extensive anasarca seen throughout the soft tissues the chest abdomen and pelvis.   Irregularity identified at the site of the prior ruth catheter along the right anterior chest wall with small radiopaque density seen in the superior soft tissues suggesting possibly calcification or radiopaque foreign body. Small bilateral pleural effusions with atelectatic changes seen at the lung bases bilaterally or infiltrate. Chronic osteomyelitis involving L1 and L2 with hardware seen within the lower lumbar spine. There is no evidence of acute intra-or pelvic abnormality. Extensive vascular calcifications seen throughout the thoracic and abdominal aorta and mesenteric and iliac vessels. CT HEAD WO CONTRAST    Result Date: 8/4/2022  Stable small hypodensity in the right jerardo, which could represent infarct. However, this should be confirmed with MRI. No new acute intracranial process identified. CT Head WO Contrast    Result Date: 8/1/2022  Small hypodensity right jerardo not demonstrated previously. Acute or subacute lacunar infarct at this location cannot be excluded. This finding could be more fully evaluated with MRI. CT Head WO Contrast    Result Date: 7/31/2022  No acute intracranial abnormality. Small scalp hematoma overlying the left orbit     CT CHEST WO CONTRAST    Result Date: 8/3/2022  Extensive anasarca seen throughout the soft tissues the chest abdomen and pelvis. Irregularity identified at the site of the prior ruth catheter along the right anterior chest wall with small radiopaque density seen in the superior soft tissues suggesting possibly calcification or radiopaque foreign body. Small bilateral pleural effusions with atelectatic changes seen at the lung bases bilaterally or infiltrate. Chronic osteomyelitis involving L1 and L2 with hardware seen within the lower lumbar spine. There is no evidence of acute intra-or pelvic abnormality. Extensive vascular calcifications seen throughout the thoracic and abdominal aorta and mesenteric and iliac vessels.      CT Cervical Spine WO Contrast    Result Date: 7/31/2022  Multilevel degenerative changes seen within the cervical spine with no acute abnormality of the cervical spine. XR CHEST PORTABLE    Result Date: 8/4/2022  No acute process     XR CHEST PORTABLE    Result Date: 8/3/2022  Endotracheal tube and nasogastric tubes in satisfactory position. XR CHEST PORTABLE    Result Date: 8/3/2022  Presumed enteric tube with tip in the distal esophagus. Repositioning is recommended. Enteric tube positioned as described. Bilateral lower lobe infiltrates and small bilateral pleural effusions. XR CHEST PORTABLE    Result Date: 8/1/2022  1. Small patchy opacity within the left lung base which could represent atelectasis or pneumonia. Atelectasis is favored. 2. The right lung is clear. XR ABDOMEN FOR NG/OG/NE TUBE PLACEMENT    Result Date: 8/3/2022  Catheter is in the right upper abdomen, probably stomach in conjunction with patient positioning. XR CHEST ABDOMEN NG PLACEMENT    Result Date: 8/2/2022  Satisfactory placement of NG tube as described.         Resident's Assessment and Plan     Claudia Zee   , 78 y.o. , female came with CC : AMS     has a past medical history of Acute CVA (cerebrovascular accident) ([address] Acute infection of bone ([address] Acute osteomyelitis of phalanx of left hand ([address] Anemia of chronic disease, Arthritis, Breast cancer ([address] CAD (coronary artery disease), Carpal tunnel syndrome, Chronic diastolic CHF (congestive heart failure) ([address] CKD (chronic kidney disease) stage 4, GFR 15-29 ml/min ([address] Diabetic retinopathy ([address] Glaucoma, Hemodialysis patient ([address] Hyperkalemia, diminished renal excretion, Hyperlipidemia, Hypertension, Hypoglycemia unawareness in type 1 diabetes mellitus ([address] Insulin dependent type 2 diabetes mellitus ([address] Neuropathy, Osteomyelitis due to secondary diabetes [facility]), Patient is Jainism, Refusal of blood product, Ventricular hypertrophy, Ventricular tachycardia ([address] and Vitreous hemorrhage ([facility] [address] [facility]          Days since Admission: 5  Days on Ventilator : 3    Consults:   Nephrology  ID   Neurology     Assessment:      Neurology    Acute versus subacute lacunar stroke  Aspirin 81 mg   Hold Brelinta due to bleeding risk   Lipitor 80 mg p.o. nightly  MRI was considered for reevaluation of stroke, however due to the presence of a spinal stimulator, repeat CT scan was ordered  Repeat CT scan reveals small hypodensity located in right jerardo, similar to previous CT, not acute intracranial abnormality   EEG reveals diffuse slowing consistent with moderate to severe metabolic encephalopathy  Intubation day 4  On Fentanyl 50 mcg/hr  Follow neurology recommendations      Cardiology    Hypotension due to missed midodrine medication VS sepsis due to wound infection  Wound culture positive for Proteus mirabilis and Serratia   Prior to second RRT, patient missed her midodrine dose  Midodrine held  Bumex held  Continue Levophed 8mcg/min  Consider adding Vasopressin 0.03 units/min if more vasopressor is needed  Continue Solu-Cortef  Echo: Ejection fraction 65%  Inpatient cardiology consultation  Continue to monitor cardiac rhythm   Will attempt to exchange left subclavian line through the guidewire     Coronary artery disease status post stent in May 2022  Continue aspirin  Hold ticagrelor   Continue statin    History of hyperlipidemia  Continue Lipitor    History of heart failure with reduced ejection fraction, ejection fraction 60%, last echo was done in August 6/2022  Bumex held due to hypotension      Pulmonology    Respiratory alkalosis likely 2/2 intubation  Patient on intubation day 4  ABG slightly alkalotic  Reduce tidal volume to 350  Monitor respiratory function  Monitor ABG        Renal    End-stage renal disease on hemodialysis 3 times a week  Patient is for sustained low efficiency dialysis, per nephrology recommendations  Will not receive dialysis today due to low hb    History of

## 2022-08-06 NOTE — PROGRESS NOTES
[address] [place] [address] [facility]  [facility]  Progress Note      C/C : resp failure      SUBJECTIVE:  Patient is tolerating medications. No reported adverse drug reactions. No nausea, vomiting, diarrhea. Sedated with fentanyl  Low dose levophed   Off vasopressin     Review of systems:  As stated above in the chief complaint, otherwise negative.     Medications:  Scheduled Meds:   insulin lispro  0-4 Units SubCUTAneous TID     insulin lispro  0-4 Units SubCUTAneous Nightly    folic acid  1 mg IntraVENous Daily    epoetin fani-epbx  2,000 Units SubCUTAneous Once per day on Mon Wed Fri    ferric gluconate (FERRLECIT) IVPB  125 mg IntraVENous Once    ascorbic acid  500 mg Oral Daily    zinc sulfate  50 mg Oral Daily    thiamine  250 mg IntraVENous Daily    meropenem  1,000 mg IntraVENous Q24H    fluconazole  200 mg IntraVENous Q24H    vancomycin (VANCOCIN) intermittent dosing (placeholder)   Other RX Placeholder    pantoprazole (PROTONIX) 40 mg injection  40 mg IntraVENous Daily    chlorhexidine  15 mL Mouth/Throat BID    [Held by provider] aspirin  81 mg Oral Daily    [Held by provider] midodrine  10 mg Oral TID     doxycycline hyclate  100 mg Oral 2 times per day    gabapentin  100 mg Oral Nightly    hydrocortisone sodium succinate PF  100 mg IntraVENous Q8H    sodium chloride flush  5-40 mL IntraVENous 2 times per day    allopurinol  100 mg Oral Daily    atorvastatin  80 mg Oral Nightly    brimonidine  1 drop Right Eye BID    [Held by provider] bumetanide  2 mg Oral Once per day on Sun Tue Thu    lidocaine-prilocaine  1 Dose Topical See Admin Instructions    latanoprost  1 drop Both Eyes Nightly    [Held by provider] sertraline  25 mg Oral Daily    ticagrelor  90 mg Oral BID    white petrolatum   Topical BID    [Held by provider] heparin (porcine)  5,000 Units SubCUTAneous Q8H     Continuous Infusions:   vasopressin (Septic Shock) infusion Stopped (08/05/22 1249)    fentaNYL 50 mcg/hr (08/06/22 0618) dextrose      norepinephrine 5 mcg/min (22 06)    sodium chloride       PRN Meds:perflutren lipid microspheres, glucose, dextrose bolus **OR** dextrose bolus, glucagon (rDNA), dextrose, white petrolatum, sodium chloride flush, sodium chloride, ondansetron **OR** ondansetron, polyethylene glycol, acetaminophen **OR** acetaminophen, [Held by provider] oxyCODONE-acetaminophen    OBJECTIVE:  BP (!) 135/55   Pulse 77   Temp 97.4 °F (36.3 °C) (Axillary)   Resp 14   Ht 5' 10\" (1.778 m)   Wt 144 lb 10 oz (65.6 kg)   SpO2 100%   BMI 20.75 kg/m²   Temp  Av.6 °F (36.4 °C)  Min: 96.8 °F (36 °C)  Max: 98.6 °F (37 °C)  Constitutional: The patient is sedated and intubated  Skin: Warm and dry. No rashes were noted. 2022 chest    2022 coccyx  HEENT: Round and reactive pupils. Moist mucous membranes. No ulcerations or thrush. Neck: Supple to movements. Chest: No use of accessory muscles to breathe. Symmetrical expansion. No wheezing, crackles or rhonchi. Cardiovascular: S1 and S2 are rhythmic and regular. No murmurs appreciated. Abdomen: Positive bowel sounds to auscultation. Benign to palpation. No masses felt. No hepatosplenomegaly. Genitourinary: Gant  Extremities: No clubbing, no cyanosis, bilateral upper and lower extremity edema.   Lines: peripheral  8/3/2022 right femoral art line  2022 left femoral triple-lumen catheter  Both lines were sitting in a pool of fecal liquid matter  Laboratory and Tests Review:  Lab Results   Component Value Date    WBC 8.3 2022    WBC 9.3 2022    WBC 8.4 2022    HGB 5.9 (LL) 2022    HCT 17.3 (L) 2022    MCV 81.6 2022     (L) 2022     Lab Results   Component Value Date    NEUTROABS 7.89 (H) 2022    NEUTROABS 8.00 (H) 2022    NEUTROABS 7.90 (H) 2022     Lab Results   Component Value Date    CRP 0.7 2016    CRPHS 2.6 2016    CRP 7.6 (H) 2015     Lab Results Component Value Date    ALT 62 (H) 08/02/2022    AST 65 (H) 08/02/2022    ALKPHOS 161 (H) 08/02/2022    BILITOT 0.3 08/02/2022     Lab Results   Component Value Date/Time     08/05/2022 07:35 PM    K 4.5 08/05/2022 07:35 PM    K 3.1 08/02/2022 06:25 AM     08/05/2022 07:35 PM    CO2 18 08/05/2022 07:35 PM    BUN 24 08/05/2022 07:35 PM    CREATININE 2.9 08/05/2022 07:35 PM    CREATININE 2.6 08/05/2022 04:08 AM    CREATININE 2.2 08/04/2022 05:58 PM    GFRAA 20 08/05/2022 07:35 PM    LABGLOM 20 08/05/2022 07:35 PM    GLUCOSE 330 08/05/2022 07:35 PM    GLUCOSE 46 04/02/2012 11:00 AM    PROT 5.1 08/02/2022 01:15 AM    LABALBU 2.8 08/02/2022 01:15 AM    LABALBU 3.8 04/02/2012 11:00 AM    CALCIUM 8.8 08/05/2022 07:35 PM    BILITOT 0.3 08/02/2022 01:15 AM    ALKPHOS 161 08/02/2022 01:15 AM    AST 65 08/02/2022 01:15 AM    ALT 62 08/02/2022 01:15 AM     Lab Results   Component Value Date    CRP 1.4 (H) 07/11/2022    CRP 7.7 (H) 04/13/2022    CRP 0.7 (H) 01/20/2022     Lab Results   Component Value Date    SEDRATE 30 (H) 06/15/2022    SEDRATE 81 (H) 04/13/2022    SEDRATE 19 01/20/2022     Radiology:    CT scan chest -  Impression     A left subclavian catheter which is looped in the subclavian vein with the   tip at the level of innominate vein with the adjacent air bubbles as noted. There is also hematoma in the left pectoralis muscle and axilla. Persistent atelectasis/infiltrates and pleural effusion in the lung bases. Diffuse anasarca. Chronic osteomyelitis discitis complex at L1-L2, with an indwelling spinal   stimulator.      Microbiology:   Lab Results   Component Value Date/Time    BC 24 Hours no growth 08/01/2022 12:33 PM    BC 5 Days no growth 07/11/2022 02:05 PM    BC 5 Days no growth 06/15/2022 09:03 PM    ORG Proteus mirabilis 08/03/2022 06:39 PM    ORG Serratia marcescens 08/03/2022 06:39 PM    ORG Staphylococcus epidermidis 04/18/2022 11:54 AM     Lab Results   Component Value Date/Time    BLOODCULT2 24 Hours no growth 08/01/2022 12:43 PM    BLOODCULT2 5 Days no growth 07/11/2022 02:05 PM    BLOODCULT2 5 Days no growth 06/15/2022 09:46 PM    ORG Proteus mirabilis 08/03/2022 06:39 PM    ORG Serratia marcescens 08/03/2022 06:39 PM    ORG Staphylococcus epidermidis 04/18/2022 11:54 AM     WOUND/ABSCESS   Date Value Ref Range Status   08/03/2022 Moderate growth  Final   08/03/2022 Heavy growth  Final   12/20/2021 Heavy growth  Final   12/20/2021 Light growth  Final     Smear, Respiratory   Date Value Ref Range Status   06/16/2022   Final    Few Polymorphonuclear leukocytes  Epithelial cells not seen  Moderate Gram positive cocci in pairs       No results found for: MPNEUMO, CLAMYDCU, LABLEGI, AFBCX, FUNGSM, LABFUNG  CULTURE, RESPIRATORY   Date Value Ref Range Status   06/16/2022 Oral Pharyngeal Cas reduced  Final     Culture Catheter Tip   Date Value Ref Range Status   06/27/2022 Growth not present  Final     No results found for: BFCS  Culture Surgical   Date Value Ref Range Status   04/18/2022 Growth not present  Final     Urine Culture, Routine   Date Value Ref Range Status   11/07/2017 Growth not present  Final   09/22/2014   Final    ,000 CFU/mL  Mixed cas isolated. Further workup and sensitivity testing  is not routinely indicated and will not be performed.   Mixed cas isolated includes:  Mixed gram positive organisms  Gram negative rods       MRSA Culture Only   Date Value Ref Range Status   08/04/2022 Methicillin resistant Staph aureus not isolated  Final   07/11/2022 Methicillin resistant Staph aureus not isolated  Final   10/04/2014 Methicillin resistant Staph aureus not isolated  Final     Microbiology        Specimen: Chest Updated: 08/05/22 1010    Organism Proteus mirabilis Abnormal     WOUND/ABSCESS Moderate growth    Organism Serratia marcescens Abnormal     WOUND/ABSCESS Heavy growth   Narrative:     Source: CHEST       Site: Right             Culture, Urine [[id number]]      Blood cx neg     8/4/2022 nares swab pending  ASSESSMENT:    New acute lacunar infarct  Treated for vertebral osteomyelitis and now on suppressive doxycycline  Rule out aspiration pneumonia-chest x-ray -is clear and the CT scan are more consistent with fluid in the lower bases  Neutropenia-improved  Chest and sacral wounds are stable, do not appear infected and colonized  Reviewed cultures from [facility] as well as [address] may be infected by fecal contamination    PLAN:  Continue doxycycline suppressive, meropenem 1 gram IV q day , diflucan 200 mg IV q day   Vancomycin Monday Wednesday Friday hemodialysis  Discussed with the care staff -lines need to be removed from the groin and changes in her clinical status with more pressor requirements and increased white count may reflect septicemia associated with infected lines  Suggest  fecal management system  Blood cultures  Follow clinically  Check final cultures  Monitor labs    Kristy Gore MD  8:29 AM  8/6/2022

## 2022-08-06 NOTE — PROGRESS NOTES
I saw and examined the patient in the ICU in the morning. I discussed the case in detail with resident staff, nursing and respiratory therapist as needed. I reviewed the pertinent laboratory studies, imaging studies, and records. Past medical history, surgical history, family history, and social history are unchanged unless stated in the history of present illness. I have reviewed the patient's medications and allergies. 79-year-old with history of ESRD on hemodialysis, CAD s/p stent, HFrEF, IDDM, hypertension, chronic lumbar CoNS osteomyelitis s/p vancomycin x 6 wks after dialysis, suppressive antibiotic therapy with doxycycline, BRCA s/p mastectomy, Scientology, sent in from dialysis center on 8/1 after completing her dialysis session for altered mental status. Admitted to medicine where 2 RRTs due to hypotension and patient was subsequently transferred to ICU. Work-up here with CT head shows right jerardo lacunar infarct. CTTAP:  Irregularity identified at the site of the prior ruth catheter along the right anterior chest wall with small radiopaque density seen in the  superior soft tissues suggesting possibly calcification or radiopaque foreign body. Extensive anasarca, Chronic osteomyelitis involving L1 and L2 with hardware seen within the lower  lumbar spine.   Extensive vascular calcifications     Acute encephalopathy  Acute CVA    Sepsis / septic shock  Proteus mirabilis / Serratia marcescens Rt chest wall infection (site of port removal on left)  Lumbar osteomyelitis  Spinal cord stimulator present    Pancytopenia / Scientology  Coagulability 2/2 DAPT / uremic platelet dysfunction  Left chest wall hematoma / Anemia  Difficult IV access    H/o CAD s/p LAD / RCA stent / MV mobile mass /ventricular bigeminy    ESRD on HD / left brachiocephalic fistula    Hypoglycemia / IDDM  Enterocolitis    H/o BRCA s/p right mastectomy and RUE lymphedema / Diffuse anasarca     Patient's mental

## 2022-08-06 NOTE — PLAN OF CARE
Problem: Chronic Conditions and Co-morbidities  Goal: Patient's chronic conditions and co-morbidity symptoms are monitored and maintained or improved  Outcome: Progressing     Problem: Discharge Planning  Goal: Discharge to home or other facility with appropriate resources  Outcome: Progressing     Problem: Pain  Goal: Verbalizes/displays adequate comfort level or baseline comfort level  Outcome: Progressing     Problem: Safety - Adult  Goal: Free from fall injury  Outcome: Progressing     Problem: ABCDS Injury Assessment  Goal: Absence of physical injury  Outcome: Progressing     Problem: Skin/Tissue Integrity  Goal: Absence of new skin breakdown  Description: 1. Monitor for areas of redness and/or skin breakdown  2. Assess vascular access sites hourly  3. Every 4-6 hours minimum:  Change oxygen saturation probe site  4. Every 4-6 hours:  If on nasal continuous positive airway pressure, respiratory therapy assess nares and determine need for appliance change or resting period.   Outcome: Progressing     Problem: Cardiovascular - Adult  Goal: Maintains optimal cardiac output and hemodynamic stability  Outcome: Progressing     Problem: Metabolic/Fluid and Electrolytes - Adult  Goal: Hemodynamic stability and optimal renal function maintained  Outcome: Progressing     Problem: Nutrition Deficit:  Goal: Optimize nutritional status  Outcome: Progressing     Problem: Respiratory - Adult  Goal: Achieves optimal ventilation and oxygenation  8/5/2022 2121 by Dena Ria RN  Outcome: Progressing  8/5/2022 1609 by Elsa Liang RCP  Outcome: Progressing  8/5/2022 1051 by Josefa Deal RCP  Outcome: Progressing

## 2022-08-06 NOTE — PROGRESS NOTES
Present in room for discussion between family and Dr. Dorcas Velasco. All voiced concerns addressed. Sister Kerry Hurst, brother Arnold Guest and niece present.

## 2022-08-07 ENCOUNTER — APPOINTMENT (OUTPATIENT)
Dept: GENERAL RADIOLOGY | Age: 67
DRG: 064 | End: 2022-08-07
Payer: COMMERCIAL

## 2022-08-07 LAB
AADO2: 60.5 MMHG
ANION GAP SERPL CALCULATED.3IONS-SCNC: 14 MMOL/L (ref 7–16)
ANISOCYTOSIS: ABNORMAL
B.E.: -4.7 MMOL/L (ref -3–3)
BASOPHILIC STIPPLING: ABNORMAL
BASOPHILS ABSOLUTE: 0 E9/L (ref 0–0.2)
BASOPHILS RELATIVE PERCENT: 0.1 % (ref 0–2)
BUN BLDV-MCNC: 32 MG/DL (ref 6–23)
BURR CELLS: ABNORMAL
CALCIUM SERPL-MCNC: 9.5 MG/DL (ref 8.6–10.2)
CHLORIDE BLD-SCNC: 96 MMOL/L (ref 98–107)
CO2: 19 MMOL/L (ref 22–29)
COHB: 1.4 % (ref 0–1.5)
CREAT SERPL-MCNC: 3.5 MG/DL (ref 0.5–1)
CRITICAL: ABNORMAL
DATE ANALYZED: ABNORMAL
DATE OF COLLECTION: ABNORMAL
EOSINOPHILS ABSOLUTE: 0 E9/L (ref 0.05–0.5)
EOSINOPHILS RELATIVE PERCENT: 0 % (ref 0–6)
FIO2: 40 %
GFR AFRICAN AMERICAN: 16
GFR NON-AFRICAN AMERICAN: 16 ML/MIN/1.73
GLUCOSE BLD-MCNC: 237 MG/DL (ref 74–99)
HCO3: 19.3 MMOL/L (ref 22–26)
HCT VFR BLD CALC: 17.6 % (ref 34–48)
HEMOGLOBIN: 5.8 G/DL (ref 11.5–15.5)
HHB: 0.7 % (ref 0–5)
HYPOCHROMIA: ABNORMAL
LAB: ABNORMAL
LYMPHOCYTES ABSOLUTE: 0.29 E9/L (ref 1.5–4)
LYMPHOCYTES RELATIVE PERCENT: 2.6 % (ref 20–42)
Lab: ABNORMAL
MAGNESIUM: 2 MG/DL (ref 1.6–2.6)
MCH RBC QN AUTO: 27 PG (ref 26–35)
MCHC RBC AUTO-ENTMCNC: 33 % (ref 32–34.5)
MCV RBC AUTO: 81.9 FL (ref 80–99.9)
METER GLUCOSE: 182 MG/DL (ref 74–99)
METER GLUCOSE: 199 MG/DL (ref 74–99)
METER GLUCOSE: 203 MG/DL (ref 74–99)
METER GLUCOSE: 207 MG/DL (ref 74–99)
METER GLUCOSE: 220 MG/DL (ref 74–99)
METHB: 0.4 % (ref 0–1.5)
MODE: AC
MONOCYTES ABSOLUTE: 0.29 E9/L (ref 0.1–0.95)
MONOCYTES RELATIVE PERCENT: 2.7 % (ref 2–12)
NEUTROPHILS ABSOLUTE: 9.31 E9/L (ref 1.8–7.3)
NEUTROPHILS RELATIVE PERCENT: 94.7 % (ref 43–80)
NUCLEATED RED BLOOD CELLS: 5.3 /100 WBC
O2 SATURATION: 99.3 % (ref 92–98.5)
O2HB: 97.5 % (ref 94–97)
OPERATOR ID: 1821
OVALOCYTES: ABNORMAL
PAPPENHEIMER BODIES: ABNORMAL
PATIENT TEMP: 37 C
PCO2: 30.3 MMHG (ref 35–45)
PDW BLD-RTO: 17.6 FL (ref 11.5–15)
PEEP/CPAP: 8 CMH2O
PFO2: 4.5 MMHG/%
PH BLOOD GAS: 7.42 (ref 7.35–7.45)
PHOSPHORUS: 3.6 MG/DL (ref 2.5–4.5)
PLATELET # BLD: 92 E9/L (ref 130–450)
PLATELET CONFIRMATION: NORMAL
PMV BLD AUTO: 12 FL (ref 7–12)
PO2: 179.9 MMHG (ref 75–100)
POIKILOCYTES: ABNORMAL
POLYCHROMASIA: ABNORMAL
POTASSIUM SERPL-SCNC: 4.5 MMOL/L (ref 3.5–5)
RBC # BLD: 2.15 E12/L (ref 3.5–5.5)
RI(T): 0.34
RR MECHANICAL: 14 B/MIN
SCHISTOCYTES: ABNORMAL
SODIUM BLD-SCNC: 129 MMOL/L (ref 132–146)
SOURCE, BLOOD GAS: ABNORMAL
TARGET CELLS: ABNORMAL
THB: 6.6 G/DL (ref 11.5–16.5)
TIME ANALYZED: 429
TOXIC GRANULATION: ABNORMAL
VANCOMYCIN RANDOM: 44.5 MCG/ML (ref 5–40)
VT MECHANICAL: 350 ML
WBC # BLD: 9.8 E9/L (ref 4.5–11.5)

## 2022-08-07 PROCEDURE — 2500000003 HC RX 250 WO HCPCS: Performed by: INTERNAL MEDICINE

## 2022-08-07 PROCEDURE — 2580000003 HC RX 258: Performed by: INTERNAL MEDICINE

## 2022-08-07 PROCEDURE — 6370000000 HC RX 637 (ALT 250 FOR IP): Performed by: INTERNAL MEDICINE

## 2022-08-07 PROCEDURE — 83735 ASSAY OF MAGNESIUM: CPT

## 2022-08-07 PROCEDURE — 99291 CRITICAL CARE FIRST HOUR: CPT | Performed by: INTERNAL MEDICINE

## 2022-08-07 PROCEDURE — 84100 ASSAY OF PHOSPHORUS: CPT

## 2022-08-07 PROCEDURE — C9113 INJ PANTOPRAZOLE SODIUM, VIA: HCPCS | Performed by: INTERNAL MEDICINE

## 2022-08-07 PROCEDURE — 99233 SBSQ HOSP IP/OBS HIGH 50: CPT | Performed by: INTERNAL MEDICINE

## 2022-08-07 PROCEDURE — 94003 VENT MGMT INPAT SUBQ DAY: CPT

## 2022-08-07 PROCEDURE — 85025 COMPLETE CBC W/AUTO DIFF WBC: CPT

## 2022-08-07 PROCEDURE — 82805 BLOOD GASES W/O2 SATURATION: CPT

## 2022-08-07 PROCEDURE — 6370000000 HC RX 637 (ALT 250 FOR IP): Performed by: SPECIALIST

## 2022-08-07 PROCEDURE — 2500000003 HC RX 250 WO HCPCS

## 2022-08-07 PROCEDURE — 2000000000 HC ICU R&B

## 2022-08-07 PROCEDURE — 6360000002 HC RX W HCPCS: Performed by: INTERNAL MEDICINE

## 2022-08-07 PROCEDURE — 80202 ASSAY OF VANCOMYCIN: CPT

## 2022-08-07 PROCEDURE — 6370000000 HC RX 637 (ALT 250 FOR IP): Performed by: FAMILY MEDICINE

## 2022-08-07 PROCEDURE — 71045 X-RAY EXAM CHEST 1 VIEW: CPT

## 2022-08-07 PROCEDURE — 2580000003 HC RX 258

## 2022-08-07 PROCEDURE — 87070 CULTURE OTHR SPECIMN AEROBIC: CPT

## 2022-08-07 PROCEDURE — 80048 BASIC METABOLIC PNL TOTAL CA: CPT

## 2022-08-07 PROCEDURE — 82962 GLUCOSE BLOOD TEST: CPT

## 2022-08-07 PROCEDURE — A4216 STERILE WATER/SALINE, 10 ML: HCPCS | Performed by: INTERNAL MEDICINE

## 2022-08-07 RX ORDER — MIDODRINE HYDROCHLORIDE 10 MG/1
10 TABLET ORAL
Status: DISCONTINUED | OUTPATIENT
Start: 2022-08-07 | End: 2022-08-10

## 2022-08-07 RX ADMIN — SODIUM CHLORIDE 125 MG: 9 INJECTION, SOLUTION INTRAVENOUS at 12:50

## 2022-08-07 RX ADMIN — LATANOPROST 1 DROP: 50 SOLUTION OPHTHALMIC at 20:33

## 2022-08-07 RX ADMIN — DOXYCYCLINE HYCLATE 100 MG: 100 CAPSULE ORAL at 08:14

## 2022-08-07 RX ADMIN — MEROPENEM 1000 MG: 1 INJECTION, POWDER, FOR SOLUTION INTRAVENOUS at 16:00

## 2022-08-07 RX ADMIN — Medication 8.96 MCG/MIN: at 02:58

## 2022-08-07 RX ADMIN — Medication 50 MG: at 08:14

## 2022-08-07 RX ADMIN — ATORVASTATIN CALCIUM 80 MG: 40 TABLET, FILM COATED ORAL at 20:32

## 2022-08-07 RX ADMIN — ALLOPURINOL 100 MG: 100 TABLET ORAL at 08:16

## 2022-08-07 RX ADMIN — SODIUM CHLORIDE, PRESERVATIVE FREE 40 MG: 5 INJECTION INTRAVENOUS at 08:14

## 2022-08-07 RX ADMIN — HYDROCORTISONE SODIUM SUCCINATE 50 MG: 100 INJECTION, POWDER, FOR SOLUTION INTRAMUSCULAR; INTRAVENOUS at 18:26

## 2022-08-07 RX ADMIN — FLUCONAZOLE IN SODIUM CHLORIDE 200 MG: 2 INJECTION, SOLUTION INTRAVENOUS at 17:07

## 2022-08-07 RX ADMIN — OXYCODONE HYDROCHLORIDE AND ACETAMINOPHEN 500 MG: 500 TABLET ORAL at 08:14

## 2022-08-07 RX ADMIN — MIDODRINE HYDROCHLORIDE 10 MG: 10 TABLET ORAL at 12:44

## 2022-08-07 RX ADMIN — Medication 50 MCG/HR: at 08:24

## 2022-08-07 RX ADMIN — DOXYCYCLINE HYCLATE 100 MG: 100 CAPSULE ORAL at 20:32

## 2022-08-07 RX ADMIN — BRIMONIDINE TARTRATE 1 DROP: 2 SOLUTION OPHTHALMIC at 08:14

## 2022-08-07 RX ADMIN — PETROLATUM: 420 OINTMENT TOPICAL at 20:31

## 2022-08-07 RX ADMIN — HYDROCORTISONE SODIUM SUCCINATE 100 MG: 100 INJECTION, POWDER, FOR SOLUTION INTRAMUSCULAR; INTRAVENOUS at 02:14

## 2022-08-07 RX ADMIN — FOLIC ACID 1 MG: 5 INJECTION, SOLUTION INTRAMUSCULAR; INTRAVENOUS; SUBCUTANEOUS at 08:15

## 2022-08-07 RX ADMIN — PETROLATUM: 420 OINTMENT TOPICAL at 08:15

## 2022-08-07 RX ADMIN — HYDROCORTISONE SODIUM SUCCINATE 100 MG: 100 INJECTION, POWDER, FOR SOLUTION INTRAMUSCULAR; INTRAVENOUS at 08:15

## 2022-08-07 RX ADMIN — SODIUM CHLORIDE, PRESERVATIVE FREE 10 ML: 5 INJECTION INTRAVENOUS at 08:16

## 2022-08-07 RX ADMIN — 0.12% CHLORHEXIDINE GLUCONATE 15 ML: 1.2 RINSE ORAL at 20:31

## 2022-08-07 RX ADMIN — INSULIN LISPRO 1 UNITS: 100 INJECTION, SOLUTION INTRAVENOUS; SUBCUTANEOUS at 18:26

## 2022-08-07 RX ADMIN — 0.12% CHLORHEXIDINE GLUCONATE 15 ML: 1.2 RINSE ORAL at 08:14

## 2022-08-07 RX ADMIN — INSULIN LISPRO 1 UNITS: 100 INJECTION, SOLUTION INTRAVENOUS; SUBCUTANEOUS at 12:50

## 2022-08-07 RX ADMIN — BRIMONIDINE TARTRATE 1 DROP: 2 SOLUTION OPHTHALMIC at 20:32

## 2022-08-07 RX ADMIN — MIDODRINE HYDROCHLORIDE 10 MG: 10 TABLET ORAL at 18:26

## 2022-08-07 RX ADMIN — THIAMINE HYDROCHLORIDE 250 MG: 100 INJECTION, SOLUTION INTRAMUSCULAR; INTRAVENOUS at 08:14

## 2022-08-07 RX ADMIN — GABAPENTIN 100 MG: 100 CAPSULE ORAL at 20:32

## 2022-08-07 RX ADMIN — WATER 10 ML: 1 INJECTION INTRAMUSCULAR; INTRAVENOUS; SUBCUTANEOUS at 02:14

## 2022-08-07 ASSESSMENT — PULMONARY FUNCTION TESTS
PIF_VALUE: 20
PIF_VALUE: 20
PIF_VALUE: 19
PIF_VALUE: 21
PIF_VALUE: 21
PIF_VALUE: 20
PIF_VALUE: 20
PIF_VALUE: 21
PIF_VALUE: 21
PIF_VALUE: 20
PIF_VALUE: 21
PIF_VALUE: 20
PIF_VALUE: 21
PIF_VALUE: 21
PIF_VALUE: 20
PIF_VALUE: 22
PIF_VALUE: 20
PIF_VALUE: 21
PIF_VALUE: 21
PIF_VALUE: 24
PIF_VALUE: 23
PIF_VALUE: 20
PIF_VALUE: 21
PIF_VALUE: 20
PIF_VALUE: 22
PIF_VALUE: 21
PIF_VALUE: 18
PIF_VALUE: 19
PIF_VALUE: 24
PIF_VALUE: 22
PIF_VALUE: 21
PIF_VALUE: 21
PIF_VALUE: 20
PIF_VALUE: 21
PIF_VALUE: 22
PIF_VALUE: 19
PIF_VALUE: 21
PIF_VALUE: 20
PIF_VALUE: 21
PIF_VALUE: 20
PIF_VALUE: 21
PIF_VALUE: 20
PIF_VALUE: 21
PIF_VALUE: 22
PIF_VALUE: 20
PIF_VALUE: 21
PIF_VALUE: 20
PIF_VALUE: 21
PIF_VALUE: 21
PIF_VALUE: 20
PIF_VALUE: 20
PIF_VALUE: 21
PIF_VALUE: 20
PIF_VALUE: 21
PIF_VALUE: 21
PIF_VALUE: 20
PIF_VALUE: 22
PIF_VALUE: 19
PIF_VALUE: 23
PIF_VALUE: 21
PIF_VALUE: 25
PIF_VALUE: 25
PIF_VALUE: 21
PIF_VALUE: 21
PIF_VALUE: 20
PIF_VALUE: 21
PIF_VALUE: 20
PIF_VALUE: 21
PIF_VALUE: 21
PIF_VALUE: 20

## 2022-08-07 ASSESSMENT — PAIN SCALES - WONG BAKER
WONGBAKER_NUMERICALRESPONSE: 0

## 2022-08-07 NOTE — PROGRESS NOTES
I saw and examined the patient in the ICU in the morning. I discussed the case in detail with resident staff, nursing and respiratory therapist as needed. I reviewed the pertinent laboratory studies, imaging studies, and records. Past medical history, surgical history, family history, and social history are unchanged unless stated in the history of present illness. I have reviewed the patient's medications and allergies. 72-year-old with history of ESRD on hemodialysis, CAD s/p stent, HFrEF, IDDM, hypertension, chronic lumbar CoNS osteomyelitis s/p vancomycin x 6 wks after dialysis, suppressive antibiotic therapy with doxycycline, BRCA s/p mastectomy, Hoahaoism, sent in from dialysis center on 8/1 after completing her dialysis session for altered mental status. Admitted to medicine where 2 RRTs due to hypotension and patient was subsequently transferred to ICU. Work-up here with CT head shows right jerardo lacunar infarct. CTTAP:  Irregularity identified at the site of the prior ruth catheter along the right anterior chest wall with small radiopaque density seen in the  superior soft tissues suggesting possibly calcification or radiopaque foreign body. Extensive anasarca, Chronic osteomyelitis involving L1 and L2 with hardware seen within the lower  lumbar spine.   Extensive vascular calcifications     Acute encephalopathy  Acute CVA    Sepsis / septic shock  Proteus mirabilis / Serratia marcescens Rt chest wall infection (site of port removal on left)  Lumbar osteomyelitis  Spinal cord stimulator    Pancytopenia / Hoahaoism  Coagulability 2/2 DAPT / uremic platelet dysfunction  Left chest wall hematoma / Anemia  Difficult IV access    H/o CAD s/p LAD / RCA stent / MV mobile mass / ventricular bigeminy    ESRD on HD / left brachiocephalic fistula    Hypoglycemia / IDDM  Enterocolitis    H/o BRCA s/p right mastectomy and RUE lymphedema / Diffuse anasarca     Patient's mental status continued to worsen and ABG showing worsening hypoxia. Intubated 8/3.    8/5 - events with diarrhea, soiling of L femoral line (8/1) and right femoral arterial line. Broadening of antibiotic coverage and antifungal coverage. Placement of left subclavian central line. Postprocedure due to patient's severe coagulopathy patient developed left chest wall hematoma, drop in hemoglobin. 8/6 -left subclavian central line guidewire exchange done with correction of malpositioned CVC. Currently anemia stable, repeat CT chest with stable hematoma. Limited blood draws for blood conservation unless patient's hemodynamic status changes. DDAVP as needed for platelet dysfunction  EPO, IV Fe, B12, folate, Zn, Vit C    Continue mechanical ventilatory support. Weaning trials  Sedation with prn fentanyl    Levophed to maintain MAP above 65  Restart midodrine and wean off Levophed   Wean Solu-Cortef  Diarrhea, sending C. difficile,  ID broadening antibiotics to vancomycin, meropenem, Diflucan    Aspirin, Brilinta on hold due to hematoma/bleeding, statin. Rpt CTH - Stable small hypodensity in the right jerardo  EEG -moderate encephalopathy  ECHO -EF 65%, severe LV concentric hypertrophy  Evaluated by cardiology for ventricular ectopy, no evidence of VT. She was on Sustained low-efficiency dialysis (SLED). Discussed with nephrology, unable to continue dialysis due to severe anemia. Tube feed  Prophylaxis with PPI, subcu heparin - held d/t bleed    Patient has extremely difficult IV access due to right upper extremity lymphedema, right port infection, left upper extremity fistula. Failed IV cannulation and right and left IJ. Currently left subclavian placed 8/5. Recannulated 8/6. Discussed with her brother Clementine Victor, patient's niece, her brother Joni Cheadle at bedside. Explained the difficult situation where we are unable to continue dialysis without blood transfusion and poor prognosis without dialysis.   Requested potential transfer. I called to [address] [place] [address] ICU and after discussion with intensivist who discussed with their nephrologist, they are also unable to provide additional services and explained the same risk with dialyzing on low hemoglobin. This patient is unstable and critically ill. There are life and organ supporting interventions that require frequent monitoring and personal assessment. There is a high possibility of sudden, clinically significant or life-threatening deterioration in this patient's condition which may require prompt therapeutic interventions. I spent 45 independent minutes of critical care time total throughout the day excluding procedures.      Bel Alcantara MD MS  Pulmonary & [facility]

## 2022-08-07 NOTE — PROGRESS NOTES
INPATIENT CARDIOLOGY FOLLOW-UP    Name: Annie Ruth    Age: 78 y.o. Date of Admission: 8/1/2022 11:42 AM    Date of Service: 8/7/2022    Chief Complaint: Follow-up for Chest wall hematoma, ventricular bigeminy anemia on DAPT    Interim History:  No new overnight cardiac complaints. Currently with no complaints of CP, SOB, palpitations, dizziness, or lightheadedness. SR on telemetry.     Review of Systems:   Cardiac: As per HPI  General: No fever, chills  Pulmonary: As per HPI  HEENT: No visual disturbances, difficult swallowing  GI: No nausea, vomiting  Endocrine: No thyroid disease or DM  Musculoskeletal: NICHOLS x 4, no focal motor deficits  Skin: Intact, no rashes  Neuro/Psych: No headache or seizures    Problem List:  Patient Active Problem List   Diagnosis    Diabetic retinopathy (HCC)    Anemia, chronic disease    Malignant neoplasm of right female breast ([address]    Atherosclerosis of native coronary artery of native heart without angina pectoris    CKD (chronic kidney disease) stage 3, GFR 30-59 ml/min (HCC)    Moderate obesity    Left ventricular hypertrophy    Herniated lumbar intervertebral disc    Lumbar degenerative disc disease    Constipation    Pseudomeningocele    Lumbar radiculopathy    Lymphedema of arm    Anemia of chronic disease    Chronic diastolic CHF (congestive heart failure) (HCC)    Hypertension    Glaucoma    Refusal of blood product    Controlled type 2 diabetes mellitus with chronic kidney disease on chronic dialysis, with long-term current use of insulin ([address]    Vitreous hemorrhage ([address]    Patient is Gnosticist    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 (HCC)    Chronic pain syndrome    Lumbar post-laminectomy syndrome    Cervicalgia    Diabetic peripheral neuropathy (HCC)    ESRD (end stage renal disease) ([address]    Bilateral carpal tunnel syndrome    Cardiac arrest ([address]    ESRD (end stage renal disease) on dialysis ([address]    Mixed hyperlipidemia Lymphedema of right upper extremity    Coronary artery disease involving native coronary artery of native heart with angina pectoris ([facility]    Mitral valve disease    CAD in native artery    Lumbar stenosis without neurogenic claudication    Intractable low back pain    Unable to ambulate    NSTEMI (non-ST elevated myocardial infarction) (HCC)    Moderate protein-calorie malnutrition (HCC)    Ischemic cardiomyopathy    Lower abdominal pain    Hypotension    Vertebral osteomyelitis, chronic (HCC)    Symptomatic sinus bradycardia    Status post amputation of right great toe ([facility])    Stroke, lacunar ([facility]    Acute CVA (cerebrovascular accident) ([facility]       Allergies: Allergies   Allergen Reactions    Furosemide Swelling and Other (See Comments)     Patient states \"I swelled up like a pig. \" states her kidneys shut down. Patient tolerates Bumex.          Current Medications:  Current Facility-Administered Medications   Medication Dose Route Frequency Provider Last Rate Last Admin    midodrine (PROAMATINE) tablet 10 mg  10 mg Oral TID  Mickey Garrido MD   10 mg at 08/07/22 1244    [START ON 8/8/2022] epoetin fani-epbx (RETACRIT) injection 3,000 Units  3,000 Units SubCUTAneous Once per day on Mon Wed Fri Maria A Ley MD        ferric gluconate (FERRLECIT) 125 mg in sodium chloride 0.9 % 100 mL IVPB  125 mg IntraVENous Once Davonte Mcdaniels  mL/hr at 08/07/22 1250 125 mg at 08/07/22 1250    insulin lispro (HUMALOG) injection vial 0-4 Units  0-4 Units SubCUTAneous TID  Chalo Butler MD   2 Units at 08/06/22 1745    insulin lispro (HUMALOG) injection vial 0-4 Units  0-4 Units SubCUTAneous Nightly Chalo Butler MD        folic acid injection 1 mg  1 mg IntraVENous Daily Chalo Butler MD   1 mg at 08/07/22 0815    vasopressin 20 Units in dextrose 5 % 100 mL infusion  0.01-0.03 Units/min IntraVENous Continuous Barry Arriola DO   Stopped at 08/05/22 1249    ascorbic acid (VITAMIN C) tablet 500 mg  500 mg Oral Daily [facility], DO   500 mg at 08/07/22 5377    zinc sulfate (ZINCATE) capsule 50 mg  50 mg Oral Daily [facility], DO   50 mg at 08/07/22 8522    thiamine (B-1) injection 250 mg  250 mg IntraVENous Daily [facility], DO   250 mg at 08/07/22 6157    meropenem (MERREM) 1,000 mg in sodium chloride 0.9 % 100 mL IVPB (mini-bag)  1,000 mg IntraVENous Q24H Mickey Hernandez MD   Stopped at 08/06/22 1720    fluconazole (DIFLUCAN) 200 mg IVPB  200 mg IntraVENous Q24H Mickey Hernandez MD   Stopped at 08/06/22 2217    vancomycin (VANCOCIN) intermittent dosing (placeholder)   Other RX Placeholder Mickey Hernandez MD        pantoprazole (PROTONIX) 40 mg in sodium chloride (PF) 10 mL injection  40 mg IntraVENous Daily [facility], DO   40 mg at 08/07/22 0814    chlorhexidine (PERIDEX) 0.12 % solution 15 mL  15 mL Mouth/Throat BID [facility], DO   15 mL at 08/07/22 0428    [Held by provider] aspirin chewable tablet 81 mg  81 mg Oral Daily Mickey Montanez MD   81 mg at 08/05/22 6521    perflutren lipid microspheres (DEFINITY) injection 1.65 mg  1.5 mL IntraVENous ONCE PRN Mickey Montanez MD        fentaNYL 10 mcg/ml in 0.9%  ml infusion   mcg/hr IntraVENous Continuous Ramirez Dunn MD   Stopped at 08/07/22 1045    glucose chewable tablet 16 g  4 tablet Oral PRN Lissy Reagan MD        dextrose bolus 10% 125 mL  125 mL IntraVENous PRN Lissy Reagan MD   Stopped at 08/03/22 1003    Or    dextrose bolus 10% 250 mL  250 mL IntraVENous PRN Lissy Reagan MD        glucagon (rDNA) injection 1 mg  1 mg SubCUTAneous PRN Lissy Reagan MD        dextrose 10 % infusion   IntraVENous Continuous PRN Lissy Reagan MD        norepinephrine (LEVOPHED) 16 mg in dextrose 5% 250 mL infusion  1-100 mcg/min IntraVENous Continuous Soo Jasmina DO 3.8 mL/hr at 08/07/22 1245 4 mcg/min at 08/07/22 1245    doxycycline hyclate (VIBRAMYCIN) capsule 100 mg  100 mg Oral 2 times per day Annika Greenfield, MD   100 mg at 08/07/22 0814    gabapentin (NEURONTIN) capsule 100 mg  100 mg Oral Nightly Mickey D Juana Velasco MD   100 mg at 08/06/22 2004    hydrocortisone sodium succinate PF (SOLU-CORTEF) injection 100 mg  100 mg IntraVENous Q8H Justine Landau,    100 mg at 08/07/22 0815    white petrolatum ointment   Topical TID PRN Kacey Espino MD        sodium chloride flush 0.9 % injection 5-40 mL  5-40 mL IntraVENous 2 times per day Tanisha Erazo MD   10 mL at 08/07/22 0816    sodium chloride flush 0.9 % injection 5-40 mL  5-40 mL IntraVENous PRN Tanisha Erazo MD        0.9 % sodium chloride infusion   IntraVENous PRN Tanisha Erazo MD        ondansetron (ZOFRAN-ODT) disintegrating tablet 4 mg  4 mg Oral Q8H PRN Tanisha Erazo MD        Or    ondansetron [facility]) injection 4 mg  4 mg IntraVENous Q6H PRN Tanisha Erazo MD        polyethylene glycol [facility]) packet 17 g  17 g Oral Daily PRN Tanisha Erazo MD        acetaminophen (TYLENOL) tablet 650 mg  650 mg Oral Q6H PRN Tanisha Erazo MD        Or    acetaminophen (TYLENOL) suppository 650 mg  650 mg Rectal Q6H PRN Tanisha Erazo MD        allopurinol (ZYLOPRIM) tablet 100 mg  100 mg Oral Daily Kelvin Logan APRN - CNP   100 mg at 08/07/22 0816    atorvastatin (LIPITOR) tablet 80 mg  80 mg Oral Nightly Kelvin Logan, APRN - CNP   80 mg at 08/06/22 2002    brimonidine (ALPHAGAN) 0.2 % ophthalmic solution 1 drop  1 drop Right Eye BID Kelvin Logan APRN - CNP   1 drop at 08/07/22 1698    [Held by provider] bumetanide (BUMEX) tablet 2 mg  2 mg Oral Once per day on Sun Tue Thu Kelvin Logan, APRN - CNP        lidocaine-prilocaine (EMLA) cream 1 Dose  1 Dose Topical See Admin Instructions Kelvin Logan APRN - FLAKO        latanoprost (XALATAN) 0.005 % ophthalmic solution 1 drop  1 drop Both Eyes Nightly Kelvin Logan, APRN - CNP   1 drop at 08/06/22 2004    [Held by provider] oxyCODONE-acetaminophen (PERCOCET) 5-325 MG per tablet 2 tablet  2 tablet Oral Q8H PRN GOYO Royal - CNP        [Held by provider] sertraline (ZOLOFT) tablet 25 mg  25 mg Oral Daily GOYO Royal - CNP   25 mg at 08/03/22 0959    [Held by provider] ticagrelor (BRILINTA) tablet 90 mg  90 mg Oral BID GOYO Royal - CNP   90 mg at 08/05/22 2046    white petrolatum ointment   Topical BID Ej Crenshaw MD   Given at 08/07/22 0815    [Held by provider] heparin (porcine) injection 5,000 Units  5,000 Units SubCUTAneous Q8H GOYO Royal - CNP   5,000 Units at 08/05/22 2034      vasopressin (Septic Shock) infusion Stopped (08/05/22 1249)    fentaNYL Stopped (08/07/22 1045)    dextrose      norepinephrine 4 mcg/min (08/07/22 1245)    sodium chloride         Physical Exam:  BP (!) 138/55   Pulse 72   Temp (!) 96.5 °F (35.8 °C) (Temporal)   Resp 14   Ht 5' 10\" (1.778 m)   Wt 151 lb 0.2 oz (68.5 kg)   SpO2 100%   BMI 21.67 kg/m²   Wt Readings from Last 3 Encounters:   08/07/22 151 lb 0.2 oz (68.5 kg)   07/31/22 200 lb (90.7 kg)   07/21/22 226 lb 6.4 oz (102.7 kg)     Appearance: Patient is sedated on the ventilator, FiO2 40%, tidal 1/3/1950, respiratory 24, PEEP 8. Skin: Intact, no rash  Head: Normocephalic, atraumatic  Eyes: EOMI, no conjunctival erythema  ENMT: No pharyngeal erythema, MMM, no rhinorrhea  Neck: Supple, no elevated JVP, no carotid bruits  Lungs: Clear to auscultation bilaterally. No wheezes, rales, or rhonchi.   Cardiac: Regular rate and rhythm, +S1S2, no murmurs apparent  Abdomen: Soft, nontender, +bowel sounds  Extremities: Moves all extremities x 4, no lower extremity edema  Neurologic: No focal motor deficits apparent, normal mood and affect  Peripheral Pulses: Intact posterior tibial pulses bilaterally    Intake/Output:    Intake/Output Summary (Last 24 hours) at 8/7/2022 1257  Last data filed at 8/7/2022 0644  Gross per 24 hour   Intake 894.53 ml   Output --   Net 894.53 ml     No intake/output data recorded. Laboratory Tests:  Recent Labs     08/05/22 1935 08/06/22  0840 08/07/22  0411   * 129* 129*   K 4.5 4.4 4.5    97* 96*   CO2 18* 20* 19*   BUN 24* 29* 32*   CREATININE 2.9* 3.3* 3.5*   GLUCOSE 330* 297* 237*   CALCIUM 8.8 9.2 9.5     Lab Results   Component Value Date/Time    MG 2.0 08/07/2022 04:11 AM     No results for input(s): ALKPHOS, ALT, AST, PROT, BILITOT, BILIDIR, LABALBU in the last 72 hours. Recent Labs     08/05/22 2103 08/05/22 2327 08/06/22 0402 08/06/22  0840 08/07/22 0411   WBC 9.3  --  8.3  --  9.8   RBC 2.30*  --  2.12*  --  2.15*   HGB 6.4*   < > 5.9* 5.5* 5.8*   HCT 19.1*   < > 17.3* 16.4* 17.6*   MCV 83.0  --  81.6  --  81.9   MCH 27.8  --  27.8  --  27.0   MCHC 33.5  --  34.1  --  33.0   RDW 17.5*  --  17.2*  --  17.6*   *  --  101*  --  92*   MPV NOT CALC  --  NOT CALC  --  12.0    < > = values in this interval not displayed.      Lab Results   Component Value Date    CKTOTAL 25 07/11/2022    CKMB 2.7 05/20/2021    TROPONINI 0.83 (H) 05/20/2021    TROPONINI 0.81 (H) 05/20/2021    TROPONINI 0.82 (H) 05/19/2021     Lab Results   Component Value Date    INR 1.0 08/01/2022    INR 1.1 06/15/2022    INR 1.0 01/20/2022    PROTIME 11.0 08/01/2022    PROTIME 12.3 06/15/2022    PROTIME 11.1 01/20/2022     Lab Results   Component Value Date    TSH 4.170 07/11/2022     Lab Results   Component Value Date    LABA1C 5.1 08/04/2022     No results found for: EAG  Lab Results   Component Value Date    CHOL 92 08/02/2022    CHOL 103 05/04/2022    CHOL 100 04/15/2022     Lab Results   Component Value Date    TRIG 66 08/02/2022    TRIG 66 05/04/2022    TRIG 107 04/15/2022     Lab Results   Component Value Date    HDL 45 08/04/2022    HDL 55 08/02/2022    HDL 42 05/04/2022     Lab Results   Component Value Date    LDLCALC 25 08/04/2022    LDLCALC 24 08/02/2022    LDLCALC 48 05/04/2022     Lab Results   Component Value Date    LABVLDL 15 08/04/2022    LABVLDL 13 08/02/2022 LABVLDL 13 05/04/2022     No results found for: CHOLHDLRATIO    Cardiac Tests:  Telemetry findings reviewed: SR at rate 60s, no new tachy/bradyarrhythmias overnight     EKG: Sinus bradycardia with a first-degree AV block with occasional premature nuclear complex, anterior infarct age undetermined, abnormal EKG. Labs and vitals were reviewed: As above blood pressure 138/55 heart rate 72 sats 100% on FiO2 40%     Labs-sodium 129, BUN/creatinine 29/3.3>> 32/3.5, potassium 4.4, hemoglobin 8.4>> 9.8>6.4>> 5.9>> 5.5>> 5.8, platelets 222>> 92, pH 7.4 PCO2 28 PO2 180 bicarb 20.7 sats 99.4     Lexiscan stress test May 19, 2021, abnormal with a large fixed defect in the apical and septal wall small fixed defect in the inferior apical wall partially reversible defect in the small area of the anterior lateral wall and EF 25% with apical septal akinesis and moderate global hypokinesis and dilated left ventricle during stress and rest and was a high risk study  Left heart cath May 2021 she underwent stenting to the LAD and RCA. CONCLUSION: Coronary artery disease: Left main: 20% eccentric mid vessel narrowing. LAD:  50% proximal vessel narrowing and 95% mid vessel stenosis. Trivial diagonal branch with 90% stenosis. LCX:  Occluded after a small caliber first marginal branch which is a chronic total occlusion. The second larger marginal branch filled late. RCA:  Large, dominant vessel with 90% heavily calcified mid vessel stenosis. 50% disease at the level of the crux and 70% ostial stenosis of the posterior descending artery branch. Markedly elevated left ventricular end-diastolic pressure. Successful balloon angioplasty with deployment of drug-eluting coronary stent to the mid LAD with very good results. Successful balloon angioplasty with deployment of drug-eluting coronary stent to the mid RCA with poststent deployment dilatation with a larger high-pressure noncompliant balloon with good results.   2D echocardiogram on May 21, 2021 left ventricle is dilated There is apical, septal and basal inferior wall severe hypokinesis to akinesis Ejection fraction is visually estimated at 30+/-5%. There is doppler evidence of stage III diastolic dysfunction. Normal right ventricular size. Right ventricle global systolic function is mildly reduced . The left atrium is moderately dilated. Severe posterior mitral annular calcification. Focal calcification mitral valve leaflets. Chordal calcification is present. A mobile well defined 1.0 cm by 0.5 cm echo density is noted on the ventricular aspect of the mitral valve suggestive of a fibroelastoma: clinical correlation is needed. No mitral stenosis. Mild mitral regurgitation. Mild to moderate tricuspid regurgitation. Moderate pulmonary hypertension. Aortic root is sclerotic and calcified. 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 It is a large vessel without any disease. There is mild distal calcification. It bifurcates into left anterior descending artery and left circumflex artery. The left anterior descending artery has moderate-to-severe proximal and ostial calcifications. There is 20% ostial disease. The rest of the vessel is mildly diffusely diseased. There is patent stent in the mid segment. The LAD gives off a few small diagonal branches. The second one is totally occluded.   The rest are mildly TR RVSP 44 mmHg no evidence of PFO/ASD on bubble study     Assessment:  CAD s/p PCI/MARYLOU to mid LAD and mid RCA-May 2021  Status post PCI/angioplasty to mid RCA in-stent restenosis May 2022  Ischemic cardiomyopathy, heart failure with improved ejection fraction EF improved from 30-35% to 65%  Septic shock, on Levophed 4 mcg/min  Acute respiratory failure  ESRD on hemodialysis  Acute blood loss anemia with chest wall hematoma at the site of left subclavian IV line, hemoglobin 5.5>> 5.8  Jehovah witness, no blood transfusions  History of chronic anemia  History of hypertension now has hypertension  Hyperlipidemia on statin therapy  History of gout  History of right breast cancer status postmastectomy, chemo and radiation 2005  History of bladder cancer s/p chemotherapy           Plan:  Continue to hold dual antiplatelet therapy and restart once hemoglobin remained stable. .  Management of sepsis and hypotension as per critical care service, continue Levophed for pressure support  Volume management and hemodialysis per nephrology service  Continue supportive care  No significant ventricular ectopy noted. Replace electrolytes and avoid hypomagnesemia and hypokalemia. Overall prognosis is guarded  Further recommendation pending above. Amarilys Rao MD., Onel Medrano.   [facility]) Cardiology

## 2022-08-07 NOTE — PROGRESS NOTES
Readings from Last 2 Encounters:   08/07/22 151 lb 0.2 oz (68.5 kg)   07/31/22 200 lb (90.7 kg)     Body mass index is 21.67 kg/m². I & O - 24hr:   Intake/Output Summary (Last 24 hours) at 8/7/2022 1723  Last data filed at 8/7/2022 1443  Gross per 24 hour   Intake 906.52 ml   Output 50 ml   Net 856.52 ml         Physical Exam:  General Appearance: Patient is intubated. RASS score -1  Neck: no adenopathy, no carotid bruit, supple, symmetrical, trachea midline, and thyroid not enlarged, symmetric, no tenderness/mass/nodules  Lung: clear to auscultation bilaterally  Heart: regular rate and rhythm, S1, S2 normal, no murmur, click, rub or gallop  Abdomen: soft, non-tender; bowel sounds normal; no masses,  no organomegaly  Extremities:  +1 bilateral edema   Musculoskeletal: No joint swelling, no muscle tenderness. Unable to assess ROM. Neurologic: Mental status: responds to pain by moving/nodding head   Skin: Right sided chest wound. Disc shaped wound about 2cm in diameter and depth. No erythema, swelling or discharge.      Lines     site day    Art line   R Femoral 5   TLC L Fem    Left subclavian  To be removed today  3   PICC None    Hemoaccess Left arm       VENT SETTINGS (Comprehensive) (if applicable):  Vent Information  Ventilator ID: [id number]  Additional Respiratory Assessments  Heart Rate: 74  Resp: 14  SpO2: 100 %  Position: Semi-Vidal's  Humidification Source: Heated wire  Humidification Temp: 37  Circuit Condensation: Drained  Airway Type: ET  Airway Size: 7.5  Cuff Pressure (cm H2O): 29 cm H2O    ABGs:   Recent Labs     08/07/22  0429   PH 7.421   PCO2 30.3*   PO2 179.9*   HCO3 19.3*   BE -4.7*   O2SAT 99.3*         Laboratory findings:  Complete Blood Count:   Recent Labs     08/05/22  2103 08/05/22  2327 08/06/22  0402 08/06/22  0840 08/07/22  0411   WBC 9.3  --  8.3  --  9.8   HGB 6.4*   < > 5.9* 5.5* 5.8*   HCT 19.1*   < > 17.3* 16.4* 17.6*   *  --  101*  --  92*    < > = values in this interval not displayed. Last 3 Blood Glucose:   Recent Labs     08/05/22 1935 08/06/22  0840 08/07/22  0411   GLUCOSE 330* 297* 237*          PT/INR:    Lab Results   Component Value Date/Time    PROTIME 11.0 08/01/2022 12:43 PM    PROTIME 10.4 02/15/2012 10:40 AM    INR 1.0 08/01/2022 12:43 PM     PTT:    Lab Results   Component Value Date/Time    APTT 45.6 08/01/2022 12:43 PM       Comprehensive Metabolic Profile:   Recent Labs     08/05/22 1935 08/06/22  0840 08/07/22  0411   * 129* 129*   K 4.5 4.4 4.5    97* 96*   CO2 18* 20* 19*   BUN 24* 29* 32*   CREATININE 2.9* 3.3* 3.5*   GLUCOSE 330* 297* 237*   CALCIUM 8.8 9.2 9.5        Magnesium:   Lab Results   Component Value Date/Time    MG 2.0 08/07/2022 04:11 AM     Phosphorus:   Lab Results   Component Value Date/Time    PHOS 3.6 08/07/2022 04:11 AM     Ionized Calcium:   Lab Results   Component Value Date/Time    CAION 1.22 11/08/2017 04:28 AM      Troponin: No results for input(s): TROPONINI in the last 72 hours. Medications     Infusions: (Fluid, Sedation, Vasopressors)    Levophed 4 mcg/min  Fentanyl held    Nutrition: NG tube feeding renal formula held today        ATB:   Antibiotics  Days   Meropenem     Vibramycin    Vancomycin  Fluconazole      Cultures:     Chest wound culture positive for proteus mirabilis and Serratia       Imaging     CT ABDOMEN PELVIS WO CONTRAST Additional Contrast? None    Result Date: 8/3/2022  Extensive anasarca seen throughout the soft tissues the chest abdomen and pelvis. Irregularity identified at the site of the prior ruth catheter along the right anterior chest wall with small radiopaque density seen in the superior soft tissues suggesting possibly calcification or radiopaque foreign body. Small bilateral pleural effusions with atelectatic changes seen at the lung bases bilaterally or infiltrate. Chronic osteomyelitis involving L1 and L2 with hardware seen within the lower lumbar spine.   There is no evidence of acute intra-or pelvic abnormality. Extensive vascular calcifications seen throughout the thoracic and abdominal aorta and mesenteric and iliac vessels. CT HEAD WO CONTRAST    Result Date: 8/4/2022  Stable small hypodensity in the right jerardo, which could represent infarct. However, this should be confirmed with MRI. No new acute intracranial process identified. CT Head WO Contrast    Result Date: 8/1/2022  Small hypodensity right jerardo not demonstrated previously. Acute or subacute lacunar infarct at this location cannot be excluded. This finding could be more fully evaluated with MRI. CT Head WO Contrast    Result Date: 7/31/2022  No acute intracranial abnormality. Small scalp hematoma overlying the left orbit     CT CHEST WO CONTRAST    Result Date: 8/3/2022  Extensive anasarca seen throughout the soft tissues the chest abdomen and pelvis. Irregularity identified at the site of the prior ruth catheter along the right anterior chest wall with small radiopaque density seen in the superior soft tissues suggesting possibly calcification or radiopaque foreign body. Small bilateral pleural effusions with atelectatic changes seen at the lung bases bilaterally or infiltrate. Chronic osteomyelitis involving L1 and L2 with hardware seen within the lower lumbar spine. There is no evidence of acute intra-or pelvic abnormality. Extensive vascular calcifications seen throughout the thoracic and abdominal aorta and mesenteric and iliac vessels. CT Cervical Spine WO Contrast    Result Date: 7/31/2022  Multilevel degenerative changes seen within the cervical spine with no acute abnormality of the cervical spine. XR CHEST PORTABLE    Result Date: 8/4/2022  No acute process     XR CHEST PORTABLE    Result Date: 8/3/2022  Endotracheal tube and nasogastric tubes in satisfactory position. XR CHEST PORTABLE    Result Date: 8/3/2022  Presumed enteric tube with tip in the distal esophagus. Repositioning is recommended. Enteric tube positioned as described. Bilateral lower lobe infiltrates and small bilateral pleural effusions. XR CHEST PORTABLE    Result Date: 8/1/2022  1. Small patchy opacity within the left lung base which could represent atelectasis or pneumonia. Atelectasis is favored. 2. The right lung is clear. XR ABDOMEN FOR NG/OG/NE TUBE PLACEMENT    Result Date: 8/3/2022  Catheter is in the right upper abdomen, probably stomach in conjunction with patient positioning. XR CHEST ABDOMEN NG PLACEMENT    Result Date: 8/2/2022  Satisfactory placement of NG tube as described. Resident's Assessment and Plan     Laurine Romana Banter   , 78 y.o. , female came with CC : AMS     has a past medical history of Acute CVA (cerebrovascular accident) ([address] Acute infection of bone ([address] Acute osteomyelitis of phalanx of left hand ([address] Anemia of chronic disease, Arthritis, Breast cancer ([address] CAD (coronary artery disease), Carpal tunnel syndrome, Chronic diastolic CHF (congestive heart failure) ([address] CKD (chronic kidney disease) stage 4, GFR 15-29 ml/min ([address] Diabetic retinopathy ([address] Glaucoma, Hemodialysis patient ([address] Hyperkalemia, diminished renal excretion, Hyperlipidemia, Hypertension, 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          Days since Admission: 7  Days on Ventilator : 5    Consults:   Nephrology  ID   Neurology     Assessment:      Neurology    Acute versus subacute lacunar stroke  Berlinta and Asprin held due to low Hb, per cardiology  Lipitor 80 mg p.o. nightly  MRI was considered for reevaluation of stroke, however due to the presence of a spinal stimulator, repeat CT scan was ordered  Repeat CT scan 400mg IV once, followed by 200mg qday  Start Meropenem 1000 mg qday  Tip cultures for Femoral arterial line and central line, pending results  Repeat blood culture, pending results   Follow MRSA Nares culture   Follow infectious disease recommendations    Chest wound infection due to Mediport removal  Mediport removed 2/2 hardware infection in June 2022  Vitamin C and Zinc, and thiamine for wound care   Wound care consult      Hematology    Acute Normocytic anemia likely 2/2 unknown bleeding source vs pectoralis hematoma   Current Hb 5.8  Follow CBC  FOBT test  CT scan wo contrast of chest reveal stable pectoralis hematoma , abdomen,and pelvis   Minimize blood draws to every other day   Continue Iron, EPO, vitamin C, thiamine, zinc, vitamin C  Add Ddavp for worsening platelet function if needed    Pancytopenia likely secondary to infection  Continue to monitor CBC  Hb dropped     Cannot receive blood products due to [employer]ish believes    GI    CT scan of abdomen reveals likely enterocolitis vs ileus   C.diff testing criteria not met     Oncology     Hx of BRCA  + breast malignancy, s/p mastectomy       DVT ppx: Heparin held   GI ppx: Protonix 40 mg        Code Status:   Full     Disposition: Continue current care    Cholo Torres MD, PGY-1    Attending physician: Dr. Cris Lane    NOTE: This report was transcribed using voice recognition software. Every effort was made to ensure accuracy; however, inadvertent computerized transcription errors may be present.

## 2022-08-07 NOTE — PROGRESS NOTES
Pharmacy Consultation Note  (Antibiotic Dosing and Monitoring)    Initial consult date: 8/5/22  Consulting physician/provider: Dr. Yessi Bean  Drug: Vancomycin  Indication: CLABSI    Age/  Gender Height Weight IBW  Allergy Information   67 y.o./female 5' 10\" (177.8 cm) 180 lb (81.6 kg)     Ideal body weight: 68.5 kg (151 lb 0.2 oz)   Furosemide      Renal Function:  Recent Labs     08/05/22  1935 08/06/22  0840 08/07/22  0411   BUN 24* 29* 32*   CREATININE 2.9* 3.3* 3.5*         Intake/Output Summary (Last 24 hours) at 8/7/2022 5032  Last data filed at 8/7/2022 0644  Gross per 24 hour   Intake 894.53 ml   Output --   Net 894.53 ml         Vancomycin Monitoring:  Trough:  No results for input(s): VANCOTROUGH in the last 72 hours. Random:    Recent Labs     08/06/22  0840 08/07/22  0411   VANCORANDOM 49.5* 44.5*       Vancomycin Administration Times:  Recent vancomycin administrations                     vancomycin (VANCOCIN) 1,250 mg in dextrose 5 % 250 mL IVPB (mg) 1,250 mg New Bag 08/03/22 2007                    Assessment:  Patient is a 78 y.o. female who has been initiated on vancomycin  Estimated Creatinine Clearance: 17 mL/min (A) (based on SCr of 3.5 mg/dL (H)). 8/5: Patient with increasing pressor requirements this AM with addition of vasopressin. WBC 2.8 -> 4.2 -> 8.4 today with 93% neutrophils. Lines to be exchanged and FMS added today.      Plan:  No additional vancomycin today or tomorrow - plan for level Tuesday  Will check vancomycin levels when appropriate  Will continue to monitor renal function   Clinical pharmacy to follow    Tobias Mcconnell, PharmD, BCPS, BCCCP 8/7/2022 8:22 AM

## 2022-08-07 NOTE — PROGRESS NOTES
Continuous  ascorbic acid (VITAMIN C) tablet 500 mg, Daily  zinc sulfate (ZINCATE) capsule 50 mg, Daily  thiamine (B-1) injection 250 mg, Daily  meropenem (MERREM) 1,000 mg in sodium chloride 0.9 % 100 mL IVPB (mini-bag), Q24H  fluconazole (DIFLUCAN) 200 mg IVPB, Q24H  vancomycin (VANCOCIN) intermittent dosing (placeholder), RX Placeholder  pantoprazole (PROTONIX) 40 mg in sodium chloride (PF) 10 mL injection, Daily  chlorhexidine (PERIDEX) 0.12 % solution 15 mL, BID  [Held by provider] aspirin chewable tablet 81 mg, Daily  perflutren lipid microspheres (DEFINITY) injection 1.65 mg, ONCE PRN  fentaNYL 10 mcg/ml in 0.9%  ml infusion, Continuous  glucose chewable tablet 16 g, PRN  dextrose bolus 10% 125 mL, PRN   Or  dextrose bolus 10% 250 mL, PRN  glucagon (rDNA) injection 1 mg, PRN  dextrose 10 % infusion, Continuous PRN  norepinephrine (LEVOPHED) 16 mg in dextrose 5% 250 mL infusion, Continuous  doxycycline hyclate (VIBRAMYCIN) capsule 100 mg, 2 times per day  gabapentin (NEURONTIN) capsule 100 mg, Nightly  hydrocortisone sodium succinate PF (SOLU-CORTEF) injection 100 mg, Q8H  white petrolatum ointment, TID PRN  sodium chloride flush 0.9 % injection 5-40 mL, 2 times per day  sodium chloride flush 0.9 % injection 5-40 mL, PRN  0.9 % sodium chloride infusion, PRN  ondansetron (ZOFRAN-ODT) disintegrating tablet 4 mg, Q8H PRN   Or  ondansetron (ZOFRAN) injection 4 mg, Q6H PRN  polyethylene glycol (GLYCOLAX) packet 17 g, Daily PRN  acetaminophen (TYLENOL) tablet 650 mg, Q6H PRN   Or  acetaminophen (TYLENOL) suppository 650 mg, Q6H PRN  allopurinol (ZYLOPRIM) tablet 100 mg, Daily  atorvastatin (LIPITOR) tablet 80 mg, Nightly  brimonidine (ALPHAGAN) 0.2 % ophthalmic solution 1 drop, BID  [Held by provider] bumetanide (BUMEX) tablet 2 mg, Once per day on Sun Tue Thu  lidocaine-prilocaine (EMLA) cream 1 Dose, See Admin Instructions  latanoprost (XALATAN) 0.005 % ophthalmic solution 1 drop, Nightly  [Held by provider] oxyCODONE-acetaminophen (PERCOCET) 5-325 MG per tablet 2 tablet, Q8H PRN  [Held by provider] sertraline (ZOLOFT) tablet 25 mg, Daily  [Held by provider] ticagrelor (BRILINTA) tablet 90 mg, BID  white petrolatum ointment, BID  [Held by provider] heparin (porcine) injection 5,000 Units, Q8H      Review of Systems:   Review of systems not obtained due to patient factors. Physical exam:  Vitals:    08/07/22 0930   BP:    Pulse: 68   Resp:    Temp:    SpO2:           General: intubated, sedated  Eyes: PERRL. No sclera icterus. No conjunctival injection. ENT: No discharge. Pharynx clear. Neck: Trachea midline. Normal thyroid. Lungs: No accessory muscle use. few scattered rhonchi  Chest: R upper chest wall small wound, dressing applied  CV: Regular rate. Regular rhythm. No murmur or rub. .   Abd: Non-tender. Non-distended. No masses. No organmegaly. Normal bowel sounds. Skin: Warm and dry. No nodule on exposed extremities. No rash on exposed extremities.   Ext: bilateral UE edema; RUE sleeve, +lymphedema; LUE AVFgood thrill and bruit        Data:   Labs:  Lab Results   Component Value Date     (L) 08/07/2022     (L) 08/06/2022     (L) 08/05/2022    K 4.5 08/07/2022    K 4.4 08/06/2022    K 4.5 08/05/2022    CL 96 (L) 08/07/2022    CO2 19 (L) 08/07/2022    CO2 20 (L) 08/06/2022    CO2 18 (L) 08/05/2022    CREATININE 3.5 (H) 08/07/2022    CREATININE 3.3 (H) 08/06/2022    CREATININE 2.9 (H) 08/05/2022    BUN 32 (H) 08/07/2022    BUN 29 (H) 08/06/2022    BUN 24 (H) 08/05/2022    GLUCOSE 237 (H) 08/07/2022    GLUCOSE 297 (H) 08/06/2022    GLUCOSE 330 (H) 08/05/2022    PHOS 3.6 08/07/2022    PHOS 3.3 08/06/2022    PHOS 3.5 08/05/2022    WBC 9.8 08/07/2022    WBC 8.3 08/06/2022    WBC 9.3 08/05/2022    HGB 5.8 (LL) 08/07/2022    HGB 5.5 (LL) 08/06/2022    HGB 5.9 (LL) 08/06/2022    HCT 17.6 (L) 08/07/2022    HCT 16.4 (L) 08/06/2022    HCT 17.3 (L) 08/06/2022    MCV 81.9 08/07/2022    PLT 92 (L) 08/07/2022 Imaging:  CT Head WO Contrast    Result Date: 8/1/2022  EXAMINATION: CT OF THE HEAD WITHOUT CONTRAST  8/1/2022 1:09 pm TECHNIQUE: CT of the head was performed without the administration of intravenous contrast. Automated exposure control, iterative reconstruction, and/or weight based adjustment of the mA/kV was utilized to reduce the radiation dose to as low as reasonably achievable. COMPARISON: 07/31/2022 noncontrast head CT. HISTORY: ORDERING SYSTEM PROVIDED HISTORY: altered mental status TECHNOLOGIST PROVIDED HISTORY: Reason for exam:->altered mental status Has a \"code stroke\" or \"stroke alert\" been called? ->No Decision Support Exception - unselect if not a suspected or confirmed emergency medical condition->Emergency Medical Condition (MA) What reading provider will be dictating this exam?->CRC FINDINGS: BRAIN/VENTRICLES: The ventricles are normal size, position and contour. Cortical sulci and sylvian fissures are mildly prominent. There are no masses or mass effect. There are no parenchymal hemorrhages or extra-axial fluid collections. There is a small hypodense area in the right jerardo not demonstrated previously. The cerebellum is unremarkable. ORBITS: The visualized portion of the orbits demonstrate no acute abnormality. SINUSES: The visualized paranasal sinuses and mastoid air cells demonstrate no acute abnormality. SOFT TISSUES/SKULL:  No definite fractures. There is a small left periorbital soft tissue contusion and hematoma, stable to slightly improved. Small hypodensity right jerardo not demonstrated previously. Acute or subacute lacunar infarct at this location cannot be excluded. This finding could be more fully evaluated with MRI.      XR CHEST PORTABLE    Result Date: 8/1/2022  EXAMINATION: ONE XRAY VIEW OF THE CHEST 8/1/2022 12:56 pm COMPARISON: Previous chest x-ray of 07/11/2022 and CT scan of 06/15/2022 HISTORY: ORDERING SYSTEM PROVIDED HISTORY: altered TECHNOLOGIST PROVIDED HISTORY: Reason for exam:->altered What reading provider will be dictating this exam?->CRC FINDINGS: The cardiac silhouette is within normal limits. The right lung is clear. There is chronic elevation right hemidiaphragm There is a very small patchy opacity seen within the left lung base which could represent atelectasis or less likely pneumonia. The left upper lobe is clear. 1. Small patchy opacity within the left lung base which could represent atelectasis or pneumonia. Atelectasis is favored. 2. The right lung is clear. Liliya Borne Charlene Branch osteoblast most of the muscle last part of    Assessment    Shock, septic  cultures no growth yet  Did not respond to fluid bolus    Altered mental status  due to R pontine infarct    Acute resp failure  Sp intubation    R upper chest wall wound/sacral decub  Mediport removed from chest  Recent vertebral OM  Abx per id    ESRD  HD dependent MWF as outpt  Last hd here thurs  No hd sat because hgb is only 5.5  ? Jehovah witness  May need emergency consent since her life is at risk    6. Hyponatremia  Na 129  Awaits hd    7.  Anemia  Started demarcus  Her life is at risk with hgb of 5.5            Rut Cooper MD  10:48 AM  8/7/2022

## 2022-08-07 NOTE — PROGRESS NOTES
[address] [place] [address] [facility]  Progress Note      C/C : resp failure      SUBJECTIVE:  Patient is tolerating medications. No reported adverse drug reactions. No nausea, vomiting, diarrhea.   Sedated with fentanyl  Low dose levophed ( 8 mcg)   Off vasopressin     Review of systems:  Could not be obtained     Medications:  Scheduled Meds:   insulin lispro  0-4 Units SubCUTAneous TID WC    insulin lispro  0-4 Units SubCUTAneous Nightly    folic acid  1 mg IntraVENous Daily    epoetin fani-epbx  2,000 Units SubCUTAneous Once per day on Mon Wed Fri    ascorbic acid  500 mg Oral Daily    zinc sulfate  50 mg Oral Daily    thiamine  250 mg IntraVENous Daily    meropenem  1,000 mg IntraVENous Q24H    fluconazole  200 mg IntraVENous Q24H    vancomycin (VANCOCIN) intermittent dosing (placeholder)   Other RX Placeholder    pantoprazole (PROTONIX) 40 mg injection  40 mg IntraVENous Daily    chlorhexidine  15 mL Mouth/Throat BID    [Held by provider] aspirin  81 mg Oral Daily    [Held by provider] midodrine  10 mg Oral TID WC    doxycycline hyclate  100 mg Oral 2 times per day    gabapentin  100 mg Oral Nightly    hydrocortisone sodium succinate PF  100 mg IntraVENous Q8H    sodium chloride flush  5-40 mL IntraVENous 2 times per day    allopurinol  100 mg Oral Daily    atorvastatin  80 mg Oral Nightly    brimonidine  1 drop Right Eye BID    [Held by provider] bumetanide  2 mg Oral Once per day on Sun Tue Thu    lidocaine-prilocaine  1 Dose Topical See Admin Instructions    latanoprost  1 drop Both Eyes Nightly    [Held by provider] sertraline  25 mg Oral Daily    [Held by provider] ticagrelor  90 mg Oral BID    white petrolatum   Topical BID    [Held by provider] heparin (porcine)  5,000 Units SubCUTAneous Q8H     Continuous Infusions:   vasopressin (Septic Shock) infusion Stopped (08/05/22 1249)    fentaNYL 50 mcg/hr (08/07/22 0824)    dextrose      norepinephrine 8.96 mcg/min (08/07/22 0644)    sodium chloride       PRN Meds:perflutren lipid microspheres, glucose, dextrose bolus **OR** dextrose bolus, glucagon (rDNA), dextrose, white petrolatum, sodium chloride flush, sodium chloride, ondansetron **OR** ondansetron, polyethylene glycol, acetaminophen **OR** acetaminophen, [Held by provider] oxyCODONE-acetaminophen    OBJECTIVE:  BP (!) 133/53   Pulse 70   Temp (!) 95.6 °F (35.3 °C) (Temporal)   Resp 14   Ht 5' 10\" (1.778 m)   Wt 151 lb 0.2 oz (68.5 kg)   SpO2 100%   BMI 21.67 kg/m²   Temp  Av.7 °F (35.9 °C)  Min: 95.6 °F (35.3 °C)  Max: 97.5 °F (36.4 °C)  Constitutional: The patient is intubated, opened eyes, sedated   Skin: Warm and dry. No rashes were noted. 2022 chest    2022 coccyx  HEENT: Round and reactive pupils. Moist mucous membranes. No ulcerations or thrush. Neck: Supple to movements. Chest: No use of accessory muscles to breathe. Symmetrical expansion. No wheezing, crackles or rhonchi. Cardiovascular: S1 and S2 are rhythmic and regular. No murmurs appreciated. Abdomen: Positive bowel sounds to auscultation. Benign to palpation. No masses felt. No hepatosplenomegaly. Genitourinary: Gant  Extremities: No clubbing, no cyanosis, bilateral upper and lower extremity edema.   Lines: peripheral  8/3/2022 right femoral art line  2022 left femoral triple-lumen catheter  Both lines were sitting in a pool of fecal liquid matter  Laboratory and Tests Review:  Lab Results   Component Value Date    WBC 9.8 2022    WBC 8.3 2022    WBC 9.3 2022    HGB 5.8 (LL) 2022    HCT 17.6 (L) 2022    MCV 81.9 2022    PLT 92 (L) 2022     Lab Results   Component Value Date    NEUTROABS 9.31 (H) 2022    NEUTROABS 7.89 (H) 2022    NEUTROABS 8.00 (H) 2022     Lab Results   Component Value Date    CRPHS 0.7 2016    CRPHS 2.6 2016    CRPHS 7.6 (H) 2015     Lab Results   Component Value Date    ALT 62 (H) 2022    AST 65 (H) 08/02/2022    ALKPHOS 161 (H) 08/02/2022    BILITOT 0.3 08/02/2022     Lab Results   Component Value Date/Time     08/07/2022 04:11 AM    K 4.5 08/07/2022 04:11 AM    K 3.1 08/02/2022 06:25 AM    CL 96 08/07/2022 04:11 AM    CO2 19 08/07/2022 04:11 AM    BUN 32 08/07/2022 04:11 AM    CREATININE 3.5 08/07/2022 04:11 AM    CREATININE 3.3 08/06/2022 08:40 AM    CREATININE 2.9 08/05/2022 07:35 PM    GFRAA 16 08/07/2022 04:11 AM    LABGLOM 16 08/07/2022 04:11 AM    GLUCOSE 237 08/07/2022 04:11 AM    GLUCOSE 46 04/02/2012 11:00 AM    PROT 5.1 08/02/2022 01:15 AM    LABALBU 2.8 08/02/2022 01:15 AM    LABALBU 3.8 04/02/2012 11:00 AM    CALCIUM 9.5 08/07/2022 04:11 AM    BILITOT 0.3 08/02/2022 01:15 AM    ALKPHOS 161 08/02/2022 01:15 AM    AST 65 08/02/2022 01:15 AM    ALT 62 08/02/2022 01:15 AM     Lab Results   Component Value Date    CRP 1.4 (H) 07/11/2022    CRP 7.7 (H) 04/13/2022    CRP 0.7 (H) 01/20/2022     Lab Results   Component Value Date    SEDRATE 30 (H) 06/15/2022    SEDRATE 81 (H) 04/13/2022    SEDRATE 19 01/20/2022     Radiology:    CT scan chest -  Impression     A left subclavian catheter which is looped in the subclavian vein with the   tip at the level of innominate vein with the adjacent air bubbles as noted. There is also hematoma in the left pectoralis muscle and axilla. Persistent atelectasis/infiltrates and pleural effusion in the lung bases. Diffuse anasarca. Chronic osteomyelitis discitis complex at L1-L2, with an indwelling spinal   stimulator.      Microbiology:   Lab Results   Component Value Date/Time    BC 5 Days no growth 08/01/2022 12:33 PM    BC 5 Days no growth 07/11/2022 02:05 PM    BC 5 Days no growth 06/15/2022 09:03 PM    ORG Proteus mirabilis 08/03/2022 06:39 PM    ORG Serratia marcescens 08/03/2022 06:39 PM    ORG Staphylococcus epidermidis 04/18/2022 11:54 AM     Lab Results   Component Value Date/Time    BLOODCULT2 5 Days no growth 08/01/2022 12:43 PM BLOODCULT2 5 Days no growth 07/11/2022 02:05 PM    BLOODCULT2 5 Days no growth 06/15/2022 09:46 PM    ORG Proteus mirabilis 08/03/2022 06:39 PM    ORG Serratia marcescens 08/03/2022 06:39 PM    ORG Staphylococcus epidermidis 04/18/2022 11:54 AM     WOUND/ABSCESS   Date Value Ref Range Status   08/03/2022 Moderate growth  Final   08/03/2022 Heavy growth  Final   12/20/2021 Heavy growth  Final   12/20/2021 Light growth  Final     Smear, Respiratory   Date Value Ref Range Status   06/16/2022   Final    Few Polymorphonuclear leukocytes  Epithelial cells not seen  Moderate Gram positive cocci in pairs       No results found for: MPNEUMO, CLAMYDCU, LABLEGI, AFBCX, FUNGSM, LABFUNG  CULTURE, RESPIRATORY   Date Value Ref Range Status   06/16/2022 Oral Pharyngeal Cas reduced  Final     Culture Catheter Tip   Date Value Ref Range Status   06/27/2022 Growth not present  Final     No results found for: BFCS  Culture Surgical   Date Value Ref Range Status   04/18/2022 Growth not present  Final     Urine Culture, Routine   Date Value Ref Range Status   11/07/2017 Growth not present  Final   09/22/2014   Final    ,000 CFU/mL  Mixed cas isolated. Further workup and sensitivity testing  is not routinely indicated and will not be performed.   Mixed cas isolated includes:  Mixed gram positive organisms  Gram negative rods       MRSA Culture Only   Date Value Ref Range Status   08/04/2022 Methicillin resistant Staph aureus not isolated  Final   07/11/2022 Methicillin resistant Staph aureus not isolated  Final   10/04/2014 Methicillin resistant Staph aureus not isolated  Final     Microbiology        Specimen: Chest Updated: 08/05/22 1010    Organism Proteus mirabilis Abnormal     WOUND/ABSCESS Moderate growth    Organism Serratia marcescens Abnormal     WOUND/ABSCESS Heavy growth   Narrative:     Source: CHEST       Site: Right             Culture, Urine [[id number]]      Blood cx neg     8/4/2022 nares swab pending  ASSESSMENT:    New acute lacunar infarct  Treated for vertebral osteomyelitis and now on suppressive doxycycline  Rule out aspiration pneumonia-chest x-ray -is clear and the CT scan are more consistent with fluid in the lower bases  Neutropenia-improved  Chest and sacral wounds are stable, do not appear infected and colonized  Reviewed cultures from [facility] as well as [address] may be infected by fecal contamination  Resp failure - intubated     PLAN:  Continue doxycycline suppressive, meropenem 1 gram IV q day , diflucan 200 mg IV q day   Vancomycin on Monday Wednesday Friday hemodialysis  Blood cultures  Follow clinically  Check final cultures  Monitor labs    Doug Montoya MD  8:35 AM  8/7/2022

## 2022-08-07 NOTE — PLAN OF CARE
Problem: Safety - Adult  Goal: Free from fall injury  Outcome: Progressing  Flowsheets (Taken 8/7/2022 0800)  Free From Fall Injury: Instruct family/caregiver on patient safety     Problem: ABCDS Injury Assessment  Goal: Absence of physical injury  Outcome: Progressing  Flowsheets (Taken 8/7/2022 0800)  Absence of Physical Injury: Implement safety measures based on patient assessment     Problem: Skin/Tissue Integrity  Goal: Absence of new skin breakdown  Description: 1. Monitor for areas of redness and/or skin breakdown  2. Assess vascular access sites hourly  3. Every 4-6 hours minimum:  Change oxygen saturation probe site  4. Every 4-6 hours:  If on nasal continuous positive airway pressure, respiratory therapy assess nares and determine need for appliance change or resting period.   Outcome: Progressing     Problem: Cardiovascular - Adult  Goal: Maintains optimal cardiac output and hemodynamic stability  Outcome: Progressing     Problem: Metabolic/Fluid and Electrolytes - Adult  Goal: Hemodynamic stability and optimal renal function maintained  Recent Flowsheet Documentation  Taken 8/7/2022 0800 by Kathya Rangel RN  Hemodynamic stability and optimal renal function maintained:   Monitor labs and assess for signs and symptoms of volume excess or deficit   Monitor intake, output and patient weight none

## 2022-08-08 ENCOUNTER — APPOINTMENT (OUTPATIENT)
Dept: GENERAL RADIOLOGY | Age: 67
DRG: 064 | End: 2022-08-08
Payer: COMMERCIAL

## 2022-08-08 LAB
AADO2: 123 MMHG
ANISOCYTOSIS: ABNORMAL
ATYPICAL LYMPHOCYTE RELATIVE PERCENT: 0.9 % (ref 0–4)
B.E.: -5.3 MMOL/L (ref -3–3)
BASOPHILIC STIPPLING: ABNORMAL
BASOPHILS ABSOLUTE: 0 E9/L (ref 0–0.2)
BASOPHILS RELATIVE PERCENT: 0.1 % (ref 0–2)
COHB: 1.2 % (ref 0–1.5)
CRITICAL: ABNORMAL
DATE ANALYZED: ABNORMAL
DATE OF COLLECTION: ABNORMAL
EOSINOPHILS ABSOLUTE: 0 E9/L (ref 0.05–0.5)
EOSINOPHILS RELATIVE PERCENT: 0 % (ref 0–6)
FIO2: 40 %
HCO3: 18.2 MMOL/L (ref 22–26)
HCT VFR BLD CALC: 14.1 % (ref 34–48)
HEMOGLOBIN: 4.8 G/DL (ref 11.5–15.5)
HHB: 1.8 % (ref 0–5)
HYPOCHROMIA: ABNORMAL
LAB: ABNORMAL
LYMPHOCYTES ABSOLUTE: 0.18 E9/L (ref 1.5–4)
LYMPHOCYTES RELATIVE PERCENT: 0.9 % (ref 20–42)
Lab: ABNORMAL
MCH RBC QN AUTO: 28.7 PG (ref 26–35)
MCHC RBC AUTO-ENTMCNC: 34 % (ref 32–34.5)
MCV RBC AUTO: 84.4 FL (ref 80–99.9)
METER GLUCOSE: 194 MG/DL (ref 74–99)
METER GLUCOSE: 217 MG/DL (ref 74–99)
METER GLUCOSE: 224 MG/DL (ref 74–99)
METER GLUCOSE: 277 MG/DL (ref 74–99)
METER GLUCOSE: 287 MG/DL (ref 74–99)
METHB: 0.6 % (ref 0–1.5)
MODE: AC
MONOCYTES ABSOLUTE: 0.64 E9/L (ref 0.1–0.95)
MONOCYTES RELATIVE PERCENT: 7 % (ref 2–12)
NEUTROPHILS ABSOLUTE: 8.28 E9/L (ref 1.8–7.3)
NEUTROPHILS RELATIVE PERCENT: 91.2 % (ref 43–80)
NUCLEATED RED BLOOD CELLS: 7.9 /100 WBC
O2 SATURATION: 98.6 % (ref 92–98.5)
O2HB: 96.4 % (ref 94–97)
OPERATOR ID: 7221
OVALOCYTES: ABNORMAL
PATIENT TEMP: 37 C
PCO2: 26.4 MMHG (ref 35–45)
PDW BLD-RTO: 17.9 FL (ref 11.5–15)
PEEP/CPAP: 8 CMH2O
PFO2: 3.05 MMHG/%
PH BLOOD GAS: 7.46 (ref 7.35–7.45)
PLATELET # BLD: 82 E9/L (ref 130–450)
PLATELET CONFIRMATION: NORMAL
PMV BLD AUTO: 12.9 FL (ref 7–12)
PO2: 121.8 MMHG (ref 75–100)
POIKILOCYTES: ABNORMAL
POLYCHROMASIA: ABNORMAL
RBC # BLD: 1.67 E12/L (ref 3.5–5.5)
RI(T): 1.01
RR MECHANICAL: 14 B/MIN
SOURCE, BLOOD GAS: ABNORMAL
TARGET CELLS: ABNORMAL
THB: 5.4 G/DL (ref 11.5–16.5)
TIME ANALYZED: 551
VT MECHANICAL: 350 ML
WBC # BLD: 9.1 E9/L (ref 4.5–11.5)

## 2022-08-08 PROCEDURE — 99233 SBSQ HOSP IP/OBS HIGH 50: CPT | Performed by: INTERNAL MEDICINE

## 2022-08-08 PROCEDURE — 2500000003 HC RX 250 WO HCPCS: Performed by: INTERNAL MEDICINE

## 2022-08-08 PROCEDURE — 6370000000 HC RX 637 (ALT 250 FOR IP): Performed by: INTERNAL MEDICINE

## 2022-08-08 PROCEDURE — 82962 GLUCOSE BLOOD TEST: CPT

## 2022-08-08 PROCEDURE — U0005 INFEC AGEN DETEC AMPLI PROBE: HCPCS

## 2022-08-08 PROCEDURE — 71045 X-RAY EXAM CHEST 1 VIEW: CPT

## 2022-08-08 PROCEDURE — 6360000002 HC RX W HCPCS: Performed by: INTERNAL MEDICINE

## 2022-08-08 PROCEDURE — 82805 BLOOD GASES W/O2 SATURATION: CPT

## 2022-08-08 PROCEDURE — 85025 COMPLETE CBC W/AUTO DIFF WBC: CPT

## 2022-08-08 PROCEDURE — 2580000003 HC RX 258: Performed by: INTERNAL MEDICINE

## 2022-08-08 PROCEDURE — 94003 VENT MGMT INPAT SUBQ DAY: CPT

## 2022-08-08 PROCEDURE — 2000000000 HC ICU R&B

## 2022-08-08 PROCEDURE — 6370000000 HC RX 637 (ALT 250 FOR IP): Performed by: FAMILY MEDICINE

## 2022-08-08 PROCEDURE — 6370000000 HC RX 637 (ALT 250 FOR IP): Performed by: SPECIALIST

## 2022-08-08 PROCEDURE — U0003 INFECTIOUS AGENT DETECTION BY NUCLEIC ACID (DNA OR RNA); SEVERE ACUTE RESPIRATORY SYNDROME CORONAVIRUS 2 (SARS-COV-2) (CORONAVIRUS DISEASE [COVID-19]), AMPLIFIED PROBE TECHNIQUE, MAKING USE OF HIGH THROUGHPUT TECHNOLOGIES AS DESCRIBED BY CMS-2020-01-R: HCPCS

## 2022-08-08 PROCEDURE — 2580000003 HC RX 258

## 2022-08-08 PROCEDURE — A4216 STERILE WATER/SALINE, 10 ML: HCPCS | Performed by: INTERNAL MEDICINE

## 2022-08-08 PROCEDURE — C9113 INJ PANTOPRAZOLE SODIUM, VIA: HCPCS | Performed by: INTERNAL MEDICINE

## 2022-08-08 RX ORDER — CYANOCOBALAMIN 1000 UG/ML
1000 INJECTION INTRAMUSCULAR; SUBCUTANEOUS DAILY
Status: DISCONTINUED | OUTPATIENT
Start: 2022-08-08 | End: 2022-08-11 | Stop reason: HOSPADM

## 2022-08-08 RX ADMIN — LATANOPROST 1 DROP: 50 SOLUTION OPHTHALMIC at 21:27

## 2022-08-08 RX ADMIN — ALLOPURINOL 100 MG: 100 TABLET ORAL at 08:52

## 2022-08-08 RX ADMIN — SODIUM CHLORIDE, PRESERVATIVE FREE 10 ML: 5 INJECTION INTRAVENOUS at 21:27

## 2022-08-08 RX ADMIN — FLUCONAZOLE IN SODIUM CHLORIDE 200 MG: 2 INJECTION, SOLUTION INTRAVENOUS at 14:47

## 2022-08-08 RX ADMIN — MIDODRINE HYDROCHLORIDE 10 MG: 10 TABLET ORAL at 08:56

## 2022-08-08 RX ADMIN — SODIUM CHLORIDE, PRESERVATIVE FREE 40 MG: 5 INJECTION INTRAVENOUS at 08:56

## 2022-08-08 RX ADMIN — CYANOCOBALAMIN 1000 MCG: 1000 INJECTION, SOLUTION INTRAMUSCULAR at 17:19

## 2022-08-08 RX ADMIN — HYDROCORTISONE SODIUM SUCCINATE 50 MG: 100 INJECTION, POWDER, FOR SOLUTION INTRAMUSCULAR; INTRAVENOUS at 17:43

## 2022-08-08 RX ADMIN — Medication 50 MG: at 09:00

## 2022-08-08 RX ADMIN — INSULIN LISPRO 1 UNITS: 100 INJECTION, SOLUTION INTRAVENOUS; SUBCUTANEOUS at 18:35

## 2022-08-08 RX ADMIN — DOXYCYCLINE HYCLATE 100 MG: 100 CAPSULE ORAL at 21:27

## 2022-08-08 RX ADMIN — PETROLATUM: 420 OINTMENT TOPICAL at 21:27

## 2022-08-08 RX ADMIN — SODIUM CHLORIDE 10 ML/HR: 9 INJECTION, SOLUTION INTRAVENOUS at 00:03

## 2022-08-08 RX ADMIN — MEROPENEM 1000 MG: 1 INJECTION, POWDER, FOR SOLUTION INTRAVENOUS at 16:36

## 2022-08-08 RX ADMIN — WATER 10 ML: 1 INJECTION INTRAMUSCULAR; INTRAVENOUS; SUBCUTANEOUS at 01:04

## 2022-08-08 RX ADMIN — MIDODRINE HYDROCHLORIDE 10 MG: 10 TABLET ORAL at 12:14

## 2022-08-08 RX ADMIN — BRIMONIDINE TARTRATE 1 DROP: 2 SOLUTION OPHTHALMIC at 21:27

## 2022-08-08 RX ADMIN — THIAMINE HYDROCHLORIDE 250 MG: 100 INJECTION, SOLUTION INTRAMUSCULAR; INTRAVENOUS at 08:59

## 2022-08-08 RX ADMIN — INSULIN LISPRO 2 UNITS: 100 INJECTION, SOLUTION INTRAVENOUS; SUBCUTANEOUS at 09:06

## 2022-08-08 RX ADMIN — 0.12% CHLORHEXIDINE GLUCONATE 15 ML: 1.2 RINSE ORAL at 08:52

## 2022-08-08 RX ADMIN — SODIUM CHLORIDE, PRESERVATIVE FREE 10 ML: 5 INJECTION INTRAVENOUS at 08:58

## 2022-08-08 RX ADMIN — MIDODRINE HYDROCHLORIDE 10 MG: 10 TABLET ORAL at 18:50

## 2022-08-08 RX ADMIN — SODIUM CHLORIDE, PRESERVATIVE FREE 10 ML: 5 INJECTION INTRAVENOUS at 01:05

## 2022-08-08 RX ADMIN — HYDROCORTISONE SODIUM SUCCINATE 50 MG: 100 INJECTION, POWDER, FOR SOLUTION INTRAMUSCULAR; INTRAVENOUS at 10:39

## 2022-08-08 RX ADMIN — PETROLATUM: 420 OINTMENT TOPICAL at 09:00

## 2022-08-08 RX ADMIN — ATORVASTATIN CALCIUM 80 MG: 40 TABLET, FILM COATED ORAL at 21:26

## 2022-08-08 RX ADMIN — FOLIC ACID 1 MG: 5 INJECTION, SOLUTION INTRAMUSCULAR; INTRAVENOUS; SUBCUTANEOUS at 08:55

## 2022-08-08 RX ADMIN — SODIUM CHLORIDE 25 MG: 9 INJECTION, SOLUTION INTRAVENOUS at 17:18

## 2022-08-08 RX ADMIN — EPOETIN ALFA-EPBX 3000 UNITS: 3000 INJECTION, SOLUTION INTRAVENOUS; SUBCUTANEOUS at 08:53

## 2022-08-08 RX ADMIN — HYDROCORTISONE SODIUM SUCCINATE 50 MG: 100 INJECTION, POWDER, FOR SOLUTION INTRAMUSCULAR; INTRAVENOUS at 01:04

## 2022-08-08 RX ADMIN — GABAPENTIN 100 MG: 100 CAPSULE ORAL at 21:31

## 2022-08-08 RX ADMIN — DOXYCYCLINE HYCLATE 100 MG: 100 CAPSULE ORAL at 08:53

## 2022-08-08 RX ADMIN — BRIMONIDINE TARTRATE 1 DROP: 2 SOLUTION OPHTHALMIC at 08:52

## 2022-08-08 RX ADMIN — OXYCODONE HYDROCHLORIDE AND ACETAMINOPHEN 500 MG: 500 TABLET ORAL at 08:52

## 2022-08-08 RX ADMIN — 0.12% CHLORHEXIDINE GLUCONATE 15 ML: 1.2 RINSE ORAL at 21:27

## 2022-08-08 RX ADMIN — SODIUM CHLORIDE 100 MG: 9 INJECTION, SOLUTION INTRAVENOUS at 18:35

## 2022-08-08 ASSESSMENT — PULMONARY FUNCTION TESTS
PIF_VALUE: 18
PIF_VALUE: 19
PIF_VALUE: 19
PIF_VALUE: 20
PIF_VALUE: 19
PIF_VALUE: 21
PIF_VALUE: 21
PIF_VALUE: 24
PIF_VALUE: 19
PIF_VALUE: 21
PIF_VALUE: 24
PIF_VALUE: 21
PIF_VALUE: 21
PIF_VALUE: 19
PIF_VALUE: 20
PIF_VALUE: 20
PIF_VALUE: 30
PIF_VALUE: 21
PIF_VALUE: 19
PIF_VALUE: 24
PIF_VALUE: 21
PIF_VALUE: 28
PIF_VALUE: 20
PIF_VALUE: 21
PIF_VALUE: 23
PIF_VALUE: 25
PIF_VALUE: 22
PIF_VALUE: 21
PIF_VALUE: 21
PIF_VALUE: 20
PIF_VALUE: 21
PIF_VALUE: 20
PIF_VALUE: 19
PIF_VALUE: 21
PIF_VALUE: 22
PIF_VALUE: 21
PIF_VALUE: 19
PIF_VALUE: 21
PIF_VALUE: 21

## 2022-08-08 ASSESSMENT — PAIN SCALES - WONG BAKER
WONGBAKER_NUMERICALRESPONSE: 0

## 2022-08-08 ASSESSMENT — PAIN SCALES - GENERAL
PAINLEVEL_OUTOF10: 0
PAINLEVEL_OUTOF10: 0

## 2022-08-08 NOTE — PLAN OF CARE
Patient's chart updated to reflect:      . - HF care plan, HF education points and HF discharge instructions.  -Orders: 2 gram sodium diet, daily weights, I/O.  -PCP and/or Cardiologist appointment to be scheduled within 7 days of hospital discharge.  -History of HF, not primary admission Dx. Patient admitted for treatment of Stroke.        Jayden Powell RN BSN  Heart Failure Navigator

## 2022-08-08 NOTE — PROGRESS NOTES
[address] [place] [address] [facility]  [facility]  Progress Note      C/C : resp failure      SUBJECTIVE:  Patient is tolerating medications. No reported adverse drug reactions. No nausea, vomiting, diarrhea.   Low dose levophed ( 1 mcg)   Off vasopressin   Sedation removed in the form of fentanyl yesterday 8/7/2022  Family present  Review of systems:  Could not be obtained     Medications:  Scheduled Meds:   midodrine  10 mg Oral TID WC    epoetin fani-epbx  3,000 Units SubCUTAneous Once per day on Mon Wed Fri    hydrocortisone sodium succinate PF  50 mg IntraVENous Q8H    insulin lispro  0-4 Units SubCUTAneous TID WC    insulin lispro  0-4 Units SubCUTAneous Nightly    folic acid  1 mg IntraVENous Daily    ascorbic acid  500 mg Oral Daily    zinc sulfate  50 mg Oral Daily    thiamine  250 mg IntraVENous Daily    meropenem  1,000 mg IntraVENous Q24H    fluconazole  200 mg IntraVENous Q24H    vancomycin (VANCOCIN) intermittent dosing (placeholder)   Other RX Placeholder    pantoprazole (PROTONIX) 40 mg injection  40 mg IntraVENous Daily    chlorhexidine  15 mL Mouth/Throat BID    [Held by provider] aspirin  81 mg Oral Daily    doxycycline hyclate  100 mg Oral 2 times per day    gabapentin  100 mg Oral Nightly    sodium chloride flush  5-40 mL IntraVENous 2 times per day    allopurinol  100 mg Oral Daily    atorvastatin  80 mg Oral Nightly    brimonidine  1 drop Right Eye BID    [Held by provider] bumetanide  2 mg Oral Once per day on Sun Tue Thu    lidocaine-prilocaine  1 Dose Topical See Admin Instructions    latanoprost  1 drop Both Eyes Nightly    [Held by provider] sertraline  25 mg Oral Daily    [Held by provider] ticagrelor  90 mg Oral BID    white petrolatum   Topical BID    [Held by provider] heparin (porcine)  5,000 Units SubCUTAneous Q8H     Continuous Infusions:   fentaNYL Stopped (08/07/22 1045)    dextrose      norepinephrine 1 mcg/min (08/08/22 1041)    sodium chloride 10 mL/hr at 08/08/22 1006 PRN Meds:perflutren lipid microspheres, glucose, dextrose bolus **OR** dextrose bolus, glucagon (rDNA), dextrose, white petrolatum, sodium chloride flush, sodium chloride, ondansetron **OR** ondansetron, polyethylene glycol, acetaminophen **OR** acetaminophen, [Held by provider] oxyCODONE-acetaminophen    OBJECTIVE:  BP (!) 130/51   Pulse 66   Temp (!) 96 °F (35.6 °C) (Temporal)   Resp 16   Ht 5' 10\" (1.778 m)   Wt 154 lb 1.6 oz (69.9 kg)   SpO2 100%   BMI 22.11 kg/m²   Temp  Av.3 °F (35.7 °C)  Min: 95.8 °F (35.4 °C)  Max: 97.2 °F (36.2 °C)  Constitutional: The patient is intubated, opened eyes, sedated   Skin: Warm and dry. No rashes were noted. 2022 chest    2022 coccyx  HEENT: Round and reactive pupils. Moist mucous membranes. No ulcerations or thrush. Neck: Supple to movements. Chest: No use of accessory muscles to breathe. Symmetrical expansion. No wheezing, crackles or rhonchi. Cardiovascular: S1 and S2 are rhythmic and regular. No murmurs appreciated. Abdomen: Positive bowel sounds to auscultation. Benign to palpation. No masses felt. No hepatosplenomegaly. Genitourinary: Gant  Extremities: No clubbing, no cyanosis, bilateral upper and lower extremity edema.   Lines: peripheral  8/3/2022 right femoral art line  2022 left femoral triple-lumen catheter  Both lines were sitting in a pool of fecal liquid matter  Laboratory and Tests Review:  Lab Results   Component Value Date    WBC 9.8 2022    WBC 8.3 2022    WBC 9.3 2022    HGB 5.8 (LL) 2022    HCT 17.6 (L) 2022    MCV 81.9 2022    PLT 92 (L) 2022     Lab Results   Component Value Date    NEUTROABS 9.31 (H) 2022    NEUTROABS 7.89 (H) 2022    NEUTROABS 8.00 (H) 2022     Lab Results   Component Value Date    CRPHS 0.7 2016    CRPHS 2.6 2016    CRPHS 7.6 (H) 2015     Lab Results   Component Value Date    ALT 62 (H) 2022    AST 65 (H) 08/02/2022    ALKPHOS 161 (H) 08/02/2022    BILITOT 0.3 08/02/2022     Lab Results   Component Value Date/Time     08/07/2022 04:11 AM    K 4.5 08/07/2022 04:11 AM    K 3.1 08/02/2022 06:25 AM    CL 96 08/07/2022 04:11 AM    CO2 19 08/07/2022 04:11 AM    BUN 32 08/07/2022 04:11 AM    CREATININE 3.5 08/07/2022 04:11 AM    CREATININE 3.3 08/06/2022 08:40 AM    CREATININE 2.9 08/05/2022 07:35 PM    GFRAA 16 08/07/2022 04:11 AM    LABGLOM 16 08/07/2022 04:11 AM    GLUCOSE 237 08/07/2022 04:11 AM    GLUCOSE 46 04/02/2012 11:00 AM    PROT 5.1 08/02/2022 01:15 AM    LABALBU 2.8 08/02/2022 01:15 AM    LABALBU 3.8 04/02/2012 11:00 AM    CALCIUM 9.5 08/07/2022 04:11 AM    BILITOT 0.3 08/02/2022 01:15 AM    ALKPHOS 161 08/02/2022 01:15 AM    AST 65 08/02/2022 01:15 AM    ALT 62 08/02/2022 01:15 AM     Lab Results   Component Value Date    CRP 1.4 (H) 07/11/2022    CRP 7.7 (H) 04/13/2022    CRP 0.7 (H) 01/20/2022     Lab Results   Component Value Date    SEDRATE 30 (H) 06/15/2022    SEDRATE 81 (H) 04/13/2022    SEDRATE 19 01/20/2022     Radiology:    CT scan chest -  Impression     A left subclavian catheter which is looped in the subclavian vein with the   tip at the level of innominate vein with the adjacent air bubbles as noted. There is also hematoma in the left pectoralis muscle and axilla. Persistent atelectasis/infiltrates and pleural effusion in the lung bases. Diffuse anasarca. Chronic osteomyelitis discitis complex at L1-L2, with an indwelling spinal   stimulator.      Microbiology:   Lab Results   Component Value Date/Time    BC 24 Hours no growth 08/06/2022 08:08 AM    BC 5 Days no growth 08/01/2022 12:33 PM    BC 5 Days no growth 07/11/2022 02:05 PM    ORG Proteus mirabilis 08/03/2022 06:39 PM    ORG Serratia marcescens 08/03/2022 06:39 PM    ORG Staphylococcus epidermidis 04/18/2022 11:54 AM     Lab Results   Component Value Date/Time    BLOODCULT2 24 Hours no growth 08/06/2022 08:08 AM BLOODCULT2 5 Days no growth 08/01/2022 12:43 PM    BLOODCULT2 5 Days no growth 07/11/2022 02:05 PM    ORG Proteus mirabilis 08/03/2022 06:39 PM    ORG Serratia marcescens 08/03/2022 06:39 PM    ORG Staphylococcus epidermidis 04/18/2022 11:54 AM     WOUND/ABSCESS   Date Value Ref Range Status   08/03/2022 Moderate growth  Final   08/03/2022 Heavy growth  Final   12/20/2021 Heavy growth  Final   12/20/2021 Light growth  Final     Smear, Respiratory   Date Value Ref Range Status   06/16/2022   Final    Few Polymorphonuclear leukocytes  Epithelial cells not seen  Moderate Gram positive cocci in pairs       No results found for: MPNEUMO, CLAMYDCU, LABLEGI, AFBCX, FUNGSM, LABFUNG  CULTURE, RESPIRATORY   Date Value Ref Range Status   06/16/2022 Oral Pharyngeal Cas reduced  Final     Culture Catheter Tip   Date Value Ref Range Status   08/07/2022 Growth not present, incubation continues  Preliminary     No results found for: BFCS  Culture Surgical   Date Value Ref Range Status   04/18/2022 Growth not present  Final     Urine Culture, Routine   Date Value Ref Range Status   11/07/2017 Growth not present  Final   09/22/2014   Final    ,000 CFU/mL  Mixed cas isolated. Further workup and sensitivity testing  is not routinely indicated and will not be performed.   Mixed cas isolated includes:  Mixed gram positive organisms  Gram negative rods       MRSA Culture Only   Date Value Ref Range Status   08/04/2022 Methicillin resistant Staph aureus not isolated  Final   07/11/2022 Methicillin resistant Staph aureus not isolated  Final   10/04/2014 Methicillin resistant Staph aureus not isolated  Final     Microbiology   8/7/2022 catheter tip -  8/6/2022 blood culture negative x2  8/1/2022 blood cultures x2 negative  8/4/2022 respiratory nasal swab negative for MRSA  8/3/2022 wound on the chest Proteus and Serratia         Specimen: Chest Updated: 08/05/22 1010    Organism Proteus mirabilis Abnormal     WOUND/ABSCESS Moderate growth    Organism Serratia marcescens Abnormal     WOUND/ABSCESS Heavy growth   Narrative:     Source: CHEST       Site: Right             Culture, Urine [[id number]]            ASSESSMENT:    New acute lacunar infarct  Treated for vertebral osteomyelitis and now on suppressive doxycycline  Rule out aspiration pneumonia-chest x-ray -is clear and the CT scan are more consistent with fluid in the lower bases  Neutropenia-improved  Chest and sacral wounds are stable, do not appear infected and colonized  Reviewed cultures from [facility] as well as [address] may be infected by fecal contamination  Resp failure - intubated     PLAN:  Continue doxycycline suppressive, meropenem 1 gram IV q day , diflucan 200 mg IV q day   Vancomycin on Monday Wednesday Friday hemodialysis-can now be stopped since bloods are negative  Blood cultures  Follow clinically  Check final cultures  Monitor labs    Kartik Lawrence MD  11:38 AM  8/8/2022

## 2022-08-08 NOTE — PLAN OF CARE
Problem: Chronic Conditions and Co-morbidities  Goal: Patient's chronic conditions and co-morbidity symptoms are monitored and maintained or improved  8/8/2022 0841 by Deana Schwartz RN  Outcome: Progressing     Problem: Pain  Goal: Verbalizes/displays adequate comfort level or baseline comfort level  8/8/2022 0841 by Deana Schwartz RN  Outcome: Progressing     Problem: Safety - Adult  Goal: Free from fall injury  8/8/2022 0841 by Deana Schwartz RN  Outcome: Progressing     Problem: ABCDS Injury Assessment  Goal: Absence of physical injury  Outcome: Progressing     Problem: Skin/Tissue Integrity  Goal: Absence of new skin breakdown  Description: 1. Monitor for areas of redness and/or skin breakdown  2. Assess vascular access sites hourly  3. Every 4-6 hours minimum:  Change oxygen saturation probe site  4. Every 4-6 hours:  If on nasal continuous positive airway pressure, respiratory therapy assess nares and determine need for appliance change or resting period. Outcome: Progressing     Problem: Nutrition Deficit:  Goal: Optimize nutritional status  Outcome: Progressing     Problem: Respiratory - Adult  Goal: Achieves optimal ventilation and oxygenation  Outcome: Progressing   Plan of care discussed with patient / family.

## 2022-08-08 NOTE — PROGRESS NOTES
[facility]   Progress note      Subjective:      Remains intubated, but awake today on fentanyl and Levophed/vasopressin  Intubated, remains on vasopressin and    Objective:    BP (!) 130/51   Pulse 68   Temp (!) 96.2 °F (35.7 °C)   Resp 14   Ht 5' 10\" (1.778 m)   Wt 154 lb 1.6 oz (69.9 kg)   SpO2 100%   BMI 22.11 kg/m²     In: 888.1 [I.V.:248.8; NG/GT:340]  Out: 50   In: 888.1   Out: 50   Intubated eyes open   HEENT: AT/NC, MMM  Neck: FROM, supple    Lungs: Clear to auscultation  CV: RRR, no MRGs  Vasc: Radial pulses 2+ right-sided in place, purulent drainage  Abdomen: Soft, non-tender; no masses or HSM  Extremities: No peripheral edema or digital cyanosis  Skin: no rash, lesions or ulcers       Recent Labs     08/06/22  0402 08/06/22  0840 08/07/22  0411 08/08/22  1308   WBC 8.3  --  9.8 9.1   HGB 5.9* 5.5* 5.8* 4.8*   HCT 17.3* 16.4* 17.6* 14.1*   *  --  92* 82*       Recent Labs     08/05/22  1935 08/06/22  0840 08/07/22  0411   * 129* 129*   K 4.5 4.4 4.5    97* 96*   CO2 18* 20* 19*   BUN 24* 29* 32*   CREATININE 2.9* 3.3* 3.5*   CALCIUM 8.8 9.2 9.5       Assessment:    Principal Problem:    Stroke, lacunar (HCC)  Active Problems:    Acute CVA (cerebrovascular accident) ([facility] [address]    CKD (chronic kidney disease) stage 3, GFR 30-59 ml/min ([facility])  Resolved Problems:    * No resolved hospital problems. *      Plan:    60-year-old female with complicated past medical history including but not limited to-ESRD, osteomyelitis, coronary artery disease, previous strokes, CHF presents to the ED with strokelike symptoms and is admitted to telemetry unit with      Sepsis/shock/respiratory failure?   Intubated 8/2/2022 secondary to worsening confusion and respiratory failure  Vent management per ICU team  Transferred to ICU setting 8-2-22 after rapid response team was called for hypotension  Pancultures pending-negative to date  On suppressive Doxy therapy for osteomyelitis of

## 2022-08-08 NOTE — PROGRESS NOTES
hypotension  Pancultures pending-negative to date  On suppressive Doxy therapy for osteomyelitis of spine-infectious disease following, input appreciated  Patient also has a large decubitus ulcer-Proteus mirabilis and Serratia  Right-sided port has been removed, no purulence at the site noted today  Merrem/Diflucan pending pan culture results empirically  Fecal management system in place now per ID request secondary to patient having feces interfering with lines  and with an open large decubitus ulcer  Nearly off Levophed     Acute/subacute lacunar infarct right jerardo  -MRI brain without contrast pending  -Aspirin 81 mg Lipitor 40 mg daily  -Consult neurology -input appreciated  -Check lipid panel and hemoglobin A1c needs tight control of risk factors.   -Monitor blood pressure-adjust medications as needed.  -Echocardiogram with ejection fraction 65%, no PFO     ESRD/anemia-5.5 hemoglobin  Transfuse PRBCs to keep greater than 7-patient does not accept transfusion secondary to [facility] inclination-[facility]  General surgery evaluation secondary to acute anemia   -Dialysis MWF  -Consult nephrology  -Monitor labs  -Discontinue IV fluids as patient is already markedly volume overloaded     Mild elevation in ALT AST  Continue to monitor daily  No evidence of Daly sign right upper quadrant     DVT Prophylaxis   PT/OT  Discharge planning     Case discussed with Dr. Patric Gee disease 8/8/ 22 at bedside  Case discussed with family at bedside today as well  Await transfer to [facility] or nontransfusion related management/treatment and [employer] patient      Regi Conde MD  4:06 PM  8/8/2022

## 2022-08-08 NOTE — PROGRESS NOTES
Comprehensive Nutrition Assessment    Type and Reason for Visit:  Reassess    Nutrition Recommendations/Plan:     Continue NPO, Modify Tube Feeding d/t MAR TF Drug Interaction w/ Vibramycin run x 18 hrs. Renal @ 40 ml/hr x 18 hrs + 1 protein modular BID via feeding tube. Will provide: 720 ml tv, 1296 kcals, 58 gm pro (1496 kcals & 110 gm pro w/ mods), 523 ml free water  Per MAR hold TF 1 hr before & 2 hrs after Vibramycin dose (ordered BID currently)       Malnutrition Assessment:  Malnutrition Status:  Severe malnutrition (08/03/22 1349)    Context:  Chronic Illness     Findings of the 6 clinical characteristics of malnutrition:  Energy Intake:  75% or less estimated energy requirements for 1 month or longer  Weight Loss:  Unable to assess (d/t fluid shifts w/ ESRD & CHF)     Body Fat Loss:   (Moderate) Orbital   Muscle Mass Loss:  Severe muscle mass loss Temples (temporalis), Thigh (quadraceps), Calf (gastrocnemius), Clavicles (pectoralis & deltoids)  Fluid Accumulation:  No significant fluid accumulation     Strength:  Not Performed    Nutrition Assessment:    Pt status declining now intubated. Admit w/ AMS 2/2 stroke & concern for sepsis. PMHx DM, CAD/CHF, ESRD on HD. Noted chest wound s/p infected mediport removal. Pt meets criteria for Severe Malnutrition. Noted previous failed swallow eval 8/2. EN support via OGT- will provide updated recs & monitor.     Nutrition Related Findings:    Pt intubated, off sedation, hypotension on pressor, +I/O's 8L, +2/+3 pitting edema, active BS, FMS in place, OGT w/ TF, HD (K/Phos WNL), hyponatremia Wound Type:  (wound to chest)       Current Nutrition Intake & Therapies:    Average Meal Intake: NPO     Current Tube Feeding (TF) Orders:  Feeding Route: Orogastric  Formula: Renal Formula  Schedule: Continuous  Feeding Regimen: 50 ml/hr (running @ 30 ml/hr)  Water Flushes: 50 ml q 4 hr= 300 ml water  Goal TF & Flush Orders Provides: 1200 ml tv, 2160 kcals, 97 gm pro, 872 ml free water, 1172 ml total water w/ flushes    Anthropometric Measures:  Height: 5' 10\" (177.8 cm)  Ideal Body Weight (IBW): 150 lbs (68 kg)    Admission Body Weight: 180 lb (81.6 kg) (8/4 first measured likely elevated will not use)  Current Body Weight: 154 lb (69.9 kg) (8/8 measured), 112.6 % IBW. Current BMI (kg/m2): 22.1  Usual Body Weight:  (variable EMR measured wts 143-178lb x 3 mon back. Likely fluid shifts w/ ESRD & CHF)                       BMI Categories: Normal Weight (BMI 22.0 to 24.9) age over 72    Estimated Daily Nutrient Needs:  Energy Requirements Based On: Formula  Weight Used for Energy Requirements: Current  Energy (kcal/day): PS3B 1352; 2351-7746  Weight Used for Protein Requirements: Current  Protein (g/day): 1.5-1.8 g/kg CBW; 105-125  Fluid (ml/day): per renal/ critical care    Nutrition Diagnosis: In context of chronic illness, Severe malnutrition related to catabolic illness (ESRD on HD) as evidenced by poor intake prior to admission, severe muscle loss, moderate loss of subcutaneous fat    Nutrition Interventions:   Nutrition Education/Counseling: Education not appropriate  Coordination of Nutrition Care: Continue to monitor while inpatient       Goals:  Previous Goal Met: Progressing toward Goal(s)  Goals: Tolerate nutrition support at goal rate       Nutrition Monitoring and Evaluation:      Food/Nutrient Intake Outcomes: Enteral Nutrition Intake/Tolerance  Physical Signs/Symptoms Outcomes: Biochemical Data, Nutrition Focused Physical Findings, Skin, Weight, GI Status, Fluid Status or Edema, Hemodynamic Status    Discharge Planning:     Too soon to determine     Antonette Yarbrough RD, LD  Contact: Ext [phone]

## 2022-08-08 NOTE — PROGRESS NOTES
INPATIENT CARDIOLOGY FOLLOW-UP    Name: Annie Ruth    Age: 78 y.o. Date of Admission: 8/1/2022 11:42 AM    Date of Service: 8/8/2022    Chief Complaint: Follow-up for septic shock, acute respiratory failure, coronary atherosclerosis, chronic kidney disease, anemia    Interim History: Patient remains intubated mechanically ventilated with a persistent anemia with most recent hemoglobin levels of less than 6 g/dL. She is persistently hypothermic.       Review of Systems:   Based on the patient's clinical status, a review of systems could not be obtained    Problem List:  Patient Active Problem List   Diagnosis    Diabetic retinopathy ([address]    Anemia, chronic disease    Malignant neoplasm of right female breast ([address]    Atherosclerosis of native coronary artery of native heart without angina pectoris    CKD (chronic kidney disease) stage 3, GFR 30-59 ml/min ([facility])    Moderate obesity    Left ventricular hypertrophy    Herniated lumbar intervertebral disc    Lumbar degenerative disc disease    Constipation    Pseudomeningocele    Lumbar radiculopathy    Lymphedema of arm    Anemia of chronic disease    Chronic diastolic CHF (congestive heart failure) ([address]    Hypertension    Glaucoma    Refusal of blood product    Controlled type 2 diabetes mellitus with chronic kidney disease on chronic dialysis, with long-term current use of insulin ([facility])    Vitreous hemorrhage ([address]    Patient is [employer]    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 (HCC)    Chronic pain syndrome    Lumbar post-laminectomy syndrome    Cervicalgia    Diabetic peripheral neuropathy (HCC)    ESRD (end stage renal disease) ([address]    Bilateral carpal tunnel syndrome    Cardiac arrest ([address]    ESRD (end stage renal disease) on dialysis ([address]    Mixed hyperlipidemia    Lymphedema of right upper extremity    Coronary artery disease involving native coronary artery of native heart with angina pectoris (HCC)    Mitral valve disease    CAD in native artery    Lumbar stenosis without neurogenic claudication    Intractable low back pain    Unable to ambulate    NSTEMI (non-ST elevated myocardial infarction) (HCC)    Moderate protein-calorie malnutrition (HCC)    Ischemic cardiomyopathy    Lower abdominal pain    Hypotension    Vertebral osteomyelitis, chronic (HCC)    Symptomatic sinus bradycardia    Status post amputation of right great toe [facility])    Stroke, lacunar ([facility]    Acute CVA (cerebrovascular accident) ([facility]       Allergies: Allergies   Allergen Reactions    Furosemide Swelling and Other (See Comments)     Patient states \"I swelled up like a pig. \" states her kidneys shut down. Patient tolerates Bumex.          Current Medications:  Current Facility-Administered Medications   Medication Dose Route Frequency Provider Last Rate Last Admin    midodrine (PROAMATINE) tablet 10 mg  10 mg Oral TID  Mickey Keen MD   10 mg at 08/07/22 1826    epoetin fani-epbx (RETACRIT) injection 3,000 Units  3,000 Units SubCUTAneous Once per day on Mon Wed Fri Teresa Mar MD        hydrocortisone sodium succinate PF (SOLU-CORTEF) injection 50 mg  50 mg IntraVENous Q8H Mickey Brian MD   50 mg at 08/08/22 0104    insulin lispro (HUMALOG) injection vial 0-4 Units  0-4 Units SubCUTAneous TID  Erin Charles MD   1 Units at 08/07/22 1826    insulin lispro (HUMALOG) injection vial 0-4 Units  0-4 Units SubCUTAneous Nightly Erin Charles MD        folic acid injection 1 mg  1 mg IntraVENous Daily Erin Charles MD   1 mg at 08/07/22 0815    vasopressin 20 Units in dextrose 5 % 100 mL infusion  0.01-0.03 Units/min IntraVENous Continuous Maynor Velásquez DO   Stopped at 08/05/22 1249    ascorbic acid (VITAMIN C) tablet 500 mg  500 mg Oral Daily Maynor Velásquez DO   500 mg at 08/07/22 9618    zinc sulfate (ZINCATE) capsule 50 mg  50 mg Oral Daily Maynor Velásquez DO   50 mg at 08/07/22 0814    thiamine (B-1) injection 250 mg  250 mg IntraVENous Daily Ward Gamboa, DO   250 mg at 08/07/22 0814    meropenem (MERREM) 1,000 mg in sodium chloride 0.9 % 100 mL IVPB (mini-bag)  1,000 mg IntraVENous Q24H Mickey Ferreira MD   Stopped at 08/07/22 1701    fluconazole (DIFLUCAN) 200 mg IVPB  200 mg IntraVENous Q24H Mickey Clark  mL/hr at 08/07/22 1707 200 mg at 08/07/22 1707    vancomycin (VANCOCIN) intermittent dosing (placeholder)   Other RX Placeholder Mickey Ferreira MD        pantoprazole (PROTONIX) 40 mg in sodium chloride (PF) 10 mL injection  40 mg IntraVENous Daily Ward Gamboa DO   40 mg at 08/07/22 0814    chlorhexidine (PERIDEX) 0.12 % solution 15 mL  15 mL Mouth/Throat BID Ward Gamboa DO   15 mL at 08/07/22 2031    [Held by provider] aspirin chewable tablet 81 mg  81 mg Oral Daily Mickey Clark MD   81 mg at 08/05/22 9406    perflutren lipid microspheres (DEFINITY) injection 1.65 mg  1.5 mL IntraVENous ONCE PRN Mickey Clark MD        fentaNYL 10 mcg/ml in 0.9%  ml infusion   mcg/hr IntraVENous Continuous Kwaku Ferrari MD   Stopped at 08/07/22 1045    glucose chewable tablet 16 g  4 tablet Oral PRN Sandi Martinez MD        dextrose bolus 10% 125 mL  125 mL IntraVENous PRCLARENCE Martinez MD   Stopped at 08/03/22 1003    Or    dextrose bolus 10% 250 mL  250 mL IntraVENous PRN Sandi Martinez MD        glucagon (rDNA) injection 1 mg  1 mg SubCUTAneous PRN Sandi Martinez MD        dextrose 10 % infusion   IntraVENous Continuous JOSEPHINE Martinez MD        norepinephrine (LEVOPHED) 16 mg in dextrose 5% 250 mL infusion  1-100 mcg/min IntraVENous Continuous Soo Freedman DO 1.9 mL/hr at 08/07/22 1915 2 mcg/min at 08/07/22 1915    doxycycline hyclate (VIBRAMYCIN) capsule 100 mg  100 mg Oral 2 times per day Kae Marques MD   100 mg at 08/07/22 2032    gabapentin (NEURONTIN) capsule 100 mg  100 mg Oral Nightly Mickey Clark MD   100 mg at 08/07/22 2032    white petrolatum ointment   Topical TID PRN Regi Conde MD        sodium chloride flush 0.9 % injection 5-40 mL  5-40 mL IntraVENous 2 times per day Reno Mir MD   10 mL at 08/08/22 0105    sodium chloride flush 0.9 % injection 5-40 mL  5-40 mL IntraVENous PRN Reno Mir MD        0.9 % sodium chloride infusion   IntraVENous PRN Reno Mir MD 10 mL/hr at 08/08/22 0003 10 mL/hr at 08/08/22 0003    ondansetron (ZOFRAN-ODT) disintegrating tablet 4 mg  4 mg Oral Q8H PRN Reno Mir MD        Or    ondansetron [facility]) injection 4 mg  4 mg IntraVENous Q6H PRN Reno Mir MD        polyethylene glycol [facility]) packet 17 g  17 g Oral Daily PRN Reno Mir MD        acetaminophen (TYLENOL) tablet 650 mg  650 mg Oral Q6H PRN Reno Mir MD        Or    acetaminophen (TYLENOL) suppository 650 mg  650 mg Rectal Q6H PRN Reno Mir MD        allopurinol (ZYLOPRIM) tablet 100 mg  100 mg Oral Daily Ligia Logan, APRN - CNP   100 mg at 08/07/22 0816    atorvastatin (LIPITOR) tablet 80 mg  80 mg Oral Nightly Ligia Logan, APRN - CNP   80 mg at 08/07/22 2032    brimonidine (ALPHAGAN) 0.2 % ophthalmic solution 1 drop  1 drop Right Eye BID Ligia Logan, APRN - CNP   1 drop at 08/07/22 2032    [Held by provider] bumetanide (BUMEX) tablet 2 mg  2 mg Oral Once per day on Sun Tue Thu Ligia Logan, APRN - CNP        lidocaine-prilocaine (EMLA) cream 1 Dose  1 Dose Topical See Admin Instructions Ligia Logan, APRN - CNP        latanoprost (XALATAN) 0.005 % ophthalmic solution 1 drop  1 drop Both Eyes Nightly Ligia Logan, APRN - CNP   1 drop at 08/07/22 2033    [Held by provider] oxyCODONE-acetaminophen (PERCOCET) 5-325 MG per tablet 2 tablet  2 tablet Oral Q8H PRN GOYO Garcia CNP        [Held by provider] sertraline (ZOLOFT) tablet 25 mg  25 mg Oral Daily GOYO Garcia CNP   25 mg at 08/03/22 0959    [Held by provider] ticagrelor (BRILINTA) tablet 90 mg  90 mg Oral BID GOYO Perez - CNP   90 mg at 08/05/22 2046    white petrolatum ointment   Topical BID Ana Ayers MD   Given at 08/07/22 2031    [Held by provider] heparin (porcine) injection 5,000 Units  5,000 Units SubCUTAneous Q8H GOYO Perez - CNP   5,000 Units at 08/05/22 2034      vasopressin (Septic Shock) infusion Stopped (08/05/22 1249)    fentaNYL Stopped (08/07/22 1045)    dextrose      norepinephrine 2 mcg/min (08/07/22 1915)    sodium chloride 10 mL/hr (08/08/22 0003)       Physical Exam:  BP (!) 130/51   Pulse 65   Temp (!) 95.8 °F (35.4 °C) (Temporal)   Resp 14   Ht 5' 10\" (1.778 m)   Wt 154 lb 1.6 oz (69.9 kg)   SpO2 96%   BMI 22.11 kg/m²   Weight change: 3 lb 1.4 oz (1.4 kg)  Wt Readings from Last 3 Encounters:   08/08/22 154 lb 1.6 oz (69.9 kg)   07/31/22 200 lb (90.7 kg)   07/21/22 226 lb 6.4 oz (102.7 kg)     The patient is intubated with limited responsiveness and in no discomfort or distress. No gross musculoskeletal deformity is present. No significant skin or nail changes are present. Gross examination of head, eyes, nose and throat are negative. Jugular venous pressure is normal and no carotid bruits are present. Normal respiratory effort is noted with no accessory muscle usage present. Lung fields demonstrate slightly coarse breath sounds to ascultation. Cardiac examination is notable for a regular rate and rhythm with no palpable thrill. No gallop rhythm or cardiac murmur are identified. A benign abdominal examination is present with no masses or organomegaly. Intact pulses are present throughout all extremities and no peripheral edema is present. No focal neurologic deficits are present. Intake/Output:    Intake/Output Summary (Last 24 hours) at 8/8/2022 0616  Last data filed at 8/7/2022 1443  Gross per 24 hour   Intake 386.52 ml   Output 50 ml   Net 336.52 ml     No intake/output data recorded.     Laboratory Tests:  Lab Results   Component Value Date    CREATININE 3.5 (H) 08/07/2022    BUN 32 (H) 08/07/2022     (L) 08/07/2022    K 4.5 08/07/2022    CL 96 (L) 08/07/2022    CO2 19 (L) 08/07/2022     No results for input(s): CKTOTAL, CKMB in the last 72 hours. Invalid input(s): TROPONONI  Lab Results   Component Value Date    BNP 1,395 (H) 02/18/2013     Lab Results   Component Value Date/Time    WBC 9.8 08/07/2022 04:11 AM    RBC 2.15 08/07/2022 04:11 AM    HGB 5.8 08/07/2022 04:11 AM    HCT 17.6 08/07/2022 04:11 AM    MCV 81.9 08/07/2022 04:11 AM    MCH 27.0 08/07/2022 04:11 AM    MCHC 33.0 08/07/2022 04:11 AM    RDW 17.6 08/07/2022 04:11 AM    PLT 92 08/07/2022 04:11 AM    MPV 12.0 08/07/2022 04:11 AM     No results for input(s): ALKPHOS, ALT, AST, PROT, BILITOT, BILIDIR, LABALBU in the last 72 hours. Lab Results   Component Value Date/Time    MG 2.0 08/07/2022 04:11 AM     Lab Results   Component Value Date/Time    PROTIME 11.0 08/01/2022 12:43 PM    PROTIME 10.4 02/15/2012 10:40 AM    INR 1.0 08/01/2022 12:43 PM     Lab Results   Component Value Date/Time    TSH 4.170 07/11/2022 10:48 AM     No components found for: CHLPL  Lab Results   Component Value Date    TRIG 66 08/02/2022    TRIG 66 05/04/2022    TRIG 107 04/15/2022     Lab Results   Component Value Date    HDL 45 08/04/2022    HDL 55 08/02/2022    HDL 42 05/04/2022     Lab Results   Component Value Date    LDLCALC 25 08/04/2022    LDLCALC 24 08/02/2022    LDLCALC 48 05/04/2022       Cardiac Tests:  Telemetry findings reviewed: sinus rhythm, no new tachy/bradyarrhythmias overnight      ASSESSMENT / PLAN: On a clinical basis, the patient remains critically ill with persistent anemia and thrombocytopenia. On this basis, persistent withholding of her antiplatelet therapy is essential and spite of risk of acute stent thrombosis with additional management largely deferred to the critical care service in addition to that of nephrology.   Ongoing nutritional support will remain essential to fluid mobilization reducing risk of future progressive debilitation. Continue aggressive risk factor modification of blood pressure, diabetes and serum lipids will remain essential to reducing risk of future atherosclerotic development. Prognosis appears guarded with consideration of evaluation by the palliative care service to assist additional advance care directives      Note: This report was completed utilizing computer voice recognition software. Every effort has been made to ensure accuracy, however; inadvertent computerized transcription errors may be present. Rey Gonzales.  [place], [id number] [facility]

## 2022-08-08 NOTE — PROGRESS NOTES
Nephrology Progress Note  Patient's Name: Ernestina Strong  11:05 AM  8/8/2022        Reason for Consult:  ESRD  Requesting Physician:  Jacyee Mccarty DO    Chief Complaint:  hypotension, altered mental status  History Obtained From:  EHR    History of Present Ilness:    Ernestina Strong is a 78 y.o. female with a history of ESRD HD dependent, vertebral osteomyelitis ( s/p antibiotic treatment ) on suppressive antibiotic therapy with doxycycline, CAD, right breast cancer( s/p chemotx ; s/p mastectomy ) ) and several other medical problems listed below. Patient was observed to be slow to response following her hemodialysis treatment at the facility. She was seen in the ED for evaluation. No history of a fall. Initial evaluation in the ED revealed patient have a blood pressure 134/87, pulse of 56 and a temperature of 97.9. She was noted to have intermittent hypotensive episodes. Laboratory data showed WBC 2.5, hemoglobin 8.1, platelet count 45,983. CHEM profile was consistent with her ESRD status. Patient was admitted to telemetry for continuation of care. Patient developed marked hypotension last evening. RRT was called. Initial blood pressure noted to be 80/40. She was given 250 cc normal saline bolus. Subsequently transferred to MICU for further management. Subjective    8/4: worsening mental status last pm , requiring intubation  8/5: Remains intubated; worsening hypotension; pressors being increased  8/6: pt seen in icu, remains intubated and sedated  8/7: pt remains in icu, sedated and intubated  8/8: pt seen in icu, remains intubated. Awaits tx to [facility] or [facility], arden nurse at bedside and with pulmonary.     Allergies:  Furosemide    Current Medications:    midodrine (PROAMATINE) tablet 10 mg, TID WC  epoetin fani-epbx (RETACRIT) injection 3,000 Units, Once per day on Mon Wed Fri  hydrocortisone sodium succinate PF (SOLU-CORTEF) injection 50 mg, Q8H  insulin lispro (HUMALOG) injection vial 0-4 Units, TID WC  insulin lispro (HUMALOG) injection vial 0-4 Units, Nightly  folic acid injection 1 mg, Daily  ascorbic acid (VITAMIN C) tablet 500 mg, Daily  zinc sulfate (ZINCATE) capsule 50 mg, Daily  thiamine (B-1) injection 250 mg, Daily  meropenem (MERREM) 1,000 mg in sodium chloride 0.9 % 100 mL IVPB (mini-bag), Q24H  fluconazole (DIFLUCAN) 200 mg IVPB, Q24H  vancomycin (VANCOCIN) intermittent dosing (placeholder), RX Placeholder  pantoprazole (PROTONIX) 40 mg in sodium chloride (PF) 10 mL injection, Daily  chlorhexidine (PERIDEX) 0.12 % solution 15 mL, BID  [Held by provider] aspirin chewable tablet 81 mg, Daily  perflutren lipid microspheres (DEFINITY) injection 1.65 mg, ONCE PRN  fentaNYL 10 mcg/ml in 0.9%  ml infusion, Continuous  glucose chewable tablet 16 g, PRN  dextrose bolus 10% 125 mL, PRN   Or  dextrose bolus 10% 250 mL, PRN  glucagon (rDNA) injection 1 mg, PRN  dextrose 10 % infusion, Continuous PRN  norepinephrine (LEVOPHED) 16 mg in dextrose 5% 250 mL infusion, Continuous  doxycycline hyclate (VIBRAMYCIN) capsule 100 mg, 2 times per day  gabapentin (NEURONTIN) capsule 100 mg, Nightly  white petrolatum ointment, TID PRN  sodium chloride flush 0.9 % injection 5-40 mL, 2 times per day  sodium chloride flush 0.9 % injection 5-40 mL, PRN  0.9 % sodium chloride infusion, PRN  ondansetron (ZOFRAN-ODT) disintegrating tablet 4 mg, Q8H PRN   Or  ondansetron (ZOFRAN) injection 4 mg, Q6H PRN  polyethylene glycol (GLYCOLAX) packet 17 g, Daily PRN  acetaminophen (TYLENOL) tablet 650 mg, Q6H PRN   Or  acetaminophen (TYLENOL) suppository 650 mg, Q6H PRN  allopurinol (ZYLOPRIM) tablet 100 mg, Daily  atorvastatin (LIPITOR) tablet 80 mg, Nightly  brimonidine (ALPHAGAN) 0.2 % ophthalmic solution 1 drop, BID  [Held by provider] bumetanide (BUMEX) tablet 2 mg, Once per day on Sun Tue Thu  lidocaine-prilocaine (EMLA) cream 1 Dose, See Admin Instructions  latanoprost (XALATAN) 0.005 % ophthalmic solution 1 drop, Nightly  [Held by provider] oxyCODONE-acetaminophen (PERCOCET) 5-325 MG per tablet 2 tablet, Q8H PRN  [Held by provider] sertraline (ZOLOFT) tablet 25 mg, Daily  [Held by provider] ticagrelor (BRILINTA) tablet 90 mg, BID  white petrolatum ointment, BID  [Held by provider] heparin (porcine) injection 5,000 Units, Q8H      Review of Systems:   Review of systems not obtained due to patient factors. Physical exam:  Vitals:    08/08/22 1100   BP:    Pulse: 66   Resp: 16   Temp:    SpO2: 100%          General: intubated, sedated  Eyes: PERRL. No sclera icterus. No conjunctival injection. ENT: No discharge. Pharynx clear. Neck: Trachea midline. Normal thyroid. Lungs: No accessory muscle use. few scattered rhonchi  Chest: R upper chest wall small wound, dressing applied  CV: Regular rate. Regular rhythm. No murmur or rub. .   Abd: Non-tender. Non-distended. No masses. No organmegaly. Normal bowel sounds. Skin: Warm and dry. No nodule on exposed extremities. No rash on exposed extremities.   Ext: bilateral UE edema; RUE sleeve, +lymphedema; LUE AVFgood thrill and bruit        Data:   Labs:  Lab Results   Component Value Date     (L) 08/07/2022     (L) 08/06/2022     (L) 08/05/2022    K 4.5 08/07/2022    K 4.4 08/06/2022    K 4.5 08/05/2022    CL 96 (L) 08/07/2022    CO2 19 (L) 08/07/2022    CO2 20 (L) 08/06/2022    CO2 18 (L) 08/05/2022    CREATININE 3.5 (H) 08/07/2022    CREATININE 3.3 (H) 08/06/2022    CREATININE 2.9 (H) 08/05/2022    BUN 32 (H) 08/07/2022    BUN 29 (H) 08/06/2022    BUN 24 (H) 08/05/2022    GLUCOSE 237 (H) 08/07/2022    GLUCOSE 297 (H) 08/06/2022    GLUCOSE 330 (H) 08/05/2022    PHOS 3.6 08/07/2022    PHOS 3.3 08/06/2022    PHOS 3.5 08/05/2022    WBC 9.8 08/07/2022    WBC 8.3 08/06/2022    WBC 9.3 08/05/2022    HGB 5.8 (LL) 08/07/2022    HGB 5.5 (LL) 08/06/2022    HGB 5.9 (LL) 08/06/2022    HCT 17.6 (L) 08/07/2022    HCT 16.4 (L) 08/06/2022    HCT 17.3 (L) 08/06/2022    MCV 81.9 08/07/2022    PLT 92 (L) 08/07/2022         Imaging:  CT Head WO Contrast    Result Date: 8/1/2022  EXAMINATION: CT OF THE HEAD WITHOUT CONTRAST  8/1/2022 1:09 pm TECHNIQUE: CT of the head was performed without the administration of intravenous contrast. Automated exposure control, iterative reconstruction, and/or weight based adjustment of the mA/kV was utilized to reduce the radiation dose to as low as reasonably achievable. COMPARISON: 07/31/2022 noncontrast head CT. HISTORY: ORDERING SYSTEM PROVIDED HISTORY: altered mental status TECHNOLOGIST PROVIDED HISTORY: Reason for exam:->altered mental status Has a \"code stroke\" or \"stroke alert\" been called? ->No Decision Support Exception - unselect if not a suspected or confirmed emergency medical condition->Emergency Medical Condition (MA) What reading provider will be dictating this exam?->CRC FINDINGS: BRAIN/VENTRICLES: The ventricles are normal size, position and contour. Cortical sulci and sylvian fissures are mildly prominent. There are no masses or mass effect. There are no parenchymal hemorrhages or extra-axial fluid collections. There is a small hypodense area in the right jerardo not demonstrated previously. The cerebellum is unremarkable. ORBITS: The visualized portion of the orbits demonstrate no acute abnormality. SINUSES: The visualized paranasal sinuses and mastoid air cells demonstrate no acute abnormality. SOFT TISSUES/SKULL:  No definite fractures. There is a small left periorbital soft tissue contusion and hematoma, stable to slightly improved. Small hypodensity right jerardo not demonstrated previously. Acute or subacute lacunar infarct at this location cannot be excluded. This finding could be more fully evaluated with MRI.      XR CHEST PORTABLE    Result Date: 8/1/2022  EXAMINATION: ONE XRAY VIEW OF THE CHEST 8/1/2022 12:56 pm COMPARISON: Previous chest x-ray of 07/11/2022 and CT scan of 06/15/2022 HISTORY: ORDERING SYSTEM PROVIDED HISTORY: altered TECHNOLOGIST PROVIDED HISTORY: Reason for exam:->altered What reading provider will be dictating this exam?->CRC FINDINGS: The cardiac silhouette is within normal limits. The right lung is clear. There is chronic elevation right hemidiaphragm There is a very small patchy opacity seen within the left lung base which could represent atelectasis or less likely pneumonia. The left upper lobe is clear. 1. Small patchy opacity within the left lung base which could represent atelectasis or pneumonia. Atelectasis is favored. 2. The right lung is clear. Sidonie Irina Charlene Branch osteoblast most of the muscle last part of    Assessment    Shock, septic  cultures no growth yet  Did not respond to fluid bolus    Altered mental status  due to R pontine infarct    Acute resp failure  Sp intubation    R upper chest wall wound/sacral decub  Mediport removed from chest  Recent vertebral OM  Abx per id    ESRD  HD dependent MWF as outpt  Last hd here thurs  No hd sat because hgb is only 5.5>5.8  Hgb from abg today 5.4  Stat hgb sent not back   ? Jehovah witness  May need emergency consent since her life is at risk  Await hgb today if 6 or higher will do hd    6. Hyponatremia  Na 129  Awaits hd    7.  Anemia  Started demarcus  Her life is at risk with hgb of 5.5 especially with cad  Awaits tx to bloodless medicine facility            Yanira Ley MD  11:05 AM  8/8/2022

## 2022-08-08 NOTE — DISCHARGE INSTRUCTIONS
***HEART FAILURE - CONGESTIVE HEART FAILURE***  DISCHARGE INSTRUCTIONS:  GUIDELINES TO FOLLOW AT BOBY/LTAC/SNF/ Assisted Living    Future Appointments   Date Time Provider Dev Arizmendi   8/11/2022 10:15 AM GOYO Kirk - CNS [facility]          MEDICATIONS:  Please notify the doctor if patient is not able to take their medications or if medications are being held for any reasons (such as low blood pressure ect.)  Do not give the patient ibuprofen (Advil or Motrin), naproxen (Aleve) without talking to the doctor first. This could make their heart failure worse. WEIGHT MONITORING:   Weigh patient every day in the morning after they void (If patient is able to stand, please get a standing weight.)   Notify the doctor of a weight gain of 3 pounds or more in 1 day   OR  a total of 5 pounds or more in 1 week             DIET   Cardiac heart healthy diet:  Low sodium diet: no  more than 2,000mg (2 grams) of salt / sodium per day (which equals to a little less than  a teaspoon of salt)/ Cardiac Diet: Low saturated / low trans fat, no added salt, caffeine restricted    If patient is there for rehab and will be returning home in the near future; reinforce with the patient and the family to follow a low sodium diet (2,000 mg)- avoid using salt at the table, avoid / limit use of canned soups, processed / packaged foods, salted snacks, olives and pickles. Do not use a salt substitute without checking with the doctor. (Mrs. Mandy Taylor is safe to use).        NOTIFY THE DOCTOR THE FIRST DAY OF ONSET OF ANY OF THESE   SYMPTOMS:   Weight gain of 3 pounds or more in 1 day         OR 5 pounds or more in one week  More shortness of breath  More swelling in stomach, legs, ankles or feet  Feeling more tired, No energy  Dry hacky cough  Dizziness  More chest pain / discomfort  Hard time breathing laying down

## 2022-08-08 NOTE — PROGRESS NOTES
[facility]  Department of Internal Medicine   Internal Medicine Residency   MICU Progress Note    Patient:  Julian Ward 78 y.o. female  MRN: [id number]     Date of Service: 2022    Allergy: Furosemide    Subjective     Patient is sedated and intubated. Patient follows commands such us blinking and attempts to squeeze my hand when asked to. Intubation day 6. No acute events overnight  Only one port of the L subclavian central line is open. Successful guide wire exchange done on L subclavian central line. Femoral Central line was removed yesterday. Femoral arterial line to remain due to large discrepancy between BP cuff and arterial line BP. Dr. Coleman Felpie spoke to patient family about the inability to perform dialysis without blood transfusion due to low hemoglobin. Family aware of critical condition. Discussions with patient family were made for potentially transferring patient to another facility for dialysis. [facility] unable to provide additional care due to similar reason. Transfer orders pending for [facility] or [address] [place] [address]. Objective     VITAL SIGNS:  BP (!) 130/51   Pulse 68   Temp 99.3 °F (37.4 °C) (Oral)   Resp 14   Ht 5' 10\" (1.778 m)   Wt 154 lb 1.6 oz (69.9 kg)   SpO2 100%   BMI 22.11 kg/m²   Tmax over 24 hours:  Temp (24hrs), Av.6 °F (35.9 °C), Min:95.8 °F (35.4 °C), Max:99.3 °F (37.4 °C)      Patient Vitals for the past 6 hrs:   Temp Temp src Pulse Resp SpO2   22 1558 -- -- 68 -- 100 %   22 1500 99.3 °F (37.4 °C) Oral 69 -- 94 %   22 1400 -- -- 68 14 96 %   22 1300 -- -- 67 16 96 %           Intake/Output Summary (Last 24 hours) at 2022 1802  Last data filed at 2022 1406  Gross per 24 hour   Intake 681.86 ml   Output 0 ml   Net 681.86 ml       Wt Readings from Last 2 Encounters:   22 154 lb 1.6 oz (69.9 kg)   22 200 lb (90.7 kg)     Body mass index is 22.11 kg/m².         I & O - 24hr:   Intake/Output Summary (Last 24 Component Value Date/Time    PROTIME 11.0 08/01/2022 12:43 PM    PROTIME 10.4 02/15/2012 10:40 AM    INR 1.0 08/01/2022 12:43 PM     PTT:    Lab Results   Component Value Date/Time    APTT 45.6 08/01/2022 12:43 PM       Comprehensive Metabolic Profile:   Recent Labs     08/05/22  1935 08/06/22  0840 08/07/22  0411   * 129* 129*   K 4.5 4.4 4.5    97* 96*   CO2 18* 20* 19*   BUN 24* 29* 32*   CREATININE 2.9* 3.3* 3.5*   GLUCOSE 330* 297* 237*   CALCIUM 8.8 9.2 9.5        Magnesium:   Lab Results   Component Value Date/Time    MG 2.0 08/07/2022 04:11 AM     Phosphorus:   Lab Results   Component Value Date/Time    PHOS 3.6 08/07/2022 04:11 AM     Ionized Calcium:   Lab Results   Component Value Date/Time    CAION 1.22 11/08/2017 04:28 AM      Troponin: No results for input(s): TROPONINI in the last 72 hours. Medications     Infusions: (Fluid, Sedation, Vasopressors)    Levophed 3 mcg/min  Fentanyl held    Nutrition: NG tube feeding renal formula       ATB:   Antibiotics  Days   Meropenem     Vibramycin    Fluconazole      Cultures:     Chest wound culture positive for proteus mirabilis and Serratia       Imaging     CT ABDOMEN PELVIS WO CONTRAST Additional Contrast? None    Result Date: 8/3/2022  Extensive anasarca seen throughout the soft tissues the chest abdomen and pelvis. Irregularity identified at the site of the prior ruth catheter along the right anterior chest wall with small radiopaque density seen in the superior soft tissues suggesting possibly calcification or radiopaque foreign body. Small bilateral pleural effusions with atelectatic changes seen at the lung bases bilaterally or infiltrate. Chronic osteomyelitis involving L1 and L2 with hardware seen within the lower lumbar spine. There is no evidence of acute intra-or pelvic abnormality. Extensive vascular calcifications seen throughout the thoracic and abdominal aorta and mesenteric and iliac vessels.      CT HEAD WO CONTRAST    Result Date: 8/4/2022  Stable small hypodensity in the right jerardo, which could represent infarct. However, this should be confirmed with MRI. No new acute intracranial process identified. CT Head WO Contrast    Result Date: 8/1/2022  Small hypodensity right jreardo not demonstrated previously. Acute or subacute lacunar infarct at this location cannot be excluded. This finding could be more fully evaluated with MRI. CT Head WO Contrast    Result Date: 7/31/2022  No acute intracranial abnormality. Small scalp hematoma overlying the left orbit     CT CHEST WO CONTRAST    Result Date: 8/3/2022  Extensive anasarca seen throughout the soft tissues the chest abdomen and pelvis. Irregularity identified at the site of the prior ruth catheter along the right anterior chest wall with small radiopaque density seen in the superior soft tissues suggesting possibly calcification or radiopaque foreign body. Small bilateral pleural effusions with atelectatic changes seen at the lung bases bilaterally or infiltrate. Chronic osteomyelitis involving L1 and L2 with hardware seen within the lower lumbar spine. There is no evidence of acute intra-or pelvic abnormality. Extensive vascular calcifications seen throughout the thoracic and abdominal aorta and mesenteric and iliac vessels. CT Cervical Spine WO Contrast    Result Date: 7/31/2022  Multilevel degenerative changes seen within the cervical spine with no acute abnormality of the cervical spine. XR CHEST PORTABLE    Result Date: 8/4/2022  No acute process     XR CHEST PORTABLE    Result Date: 8/3/2022  Endotracheal tube and nasogastric tubes in satisfactory position. XR CHEST PORTABLE    Result Date: 8/3/2022  Presumed enteric tube with tip in the distal esophagus. Repositioning is recommended. Enteric tube positioned as described. Bilateral lower lobe infiltrates and small bilateral pleural effusions.      XR CHEST PORTABLE    Result Date: 8/1/2022  1. Small patchy opacity within the left lung base which could represent atelectasis or pneumonia. Atelectasis is favored. 2. The right lung is clear. XR ABDOMEN FOR NG/OG/NE TUBE PLACEMENT    Result Date: 8/3/2022  Catheter is in the right upper abdomen, probably stomach in conjunction with patient positioning. XR CHEST ABDOMEN NG PLACEMENT    Result Date: 8/2/2022  Satisfactory placement of NG tube as described. Resident's Assessment and Plan     Claudia Pitts   , 78 y.o. , female came with CC : AMS     has a past medical history of Acute CVA (cerebrovascular accident) ([address] Acute infection of bone ([address] Acute osteomyelitis of phalanx of left hand ([address] Anemia of chronic disease, Arthritis, Breast cancer ([address] CAD (coronary artery disease), Carpal tunnel syndrome, Chronic diastolic CHF (congestive heart failure) ([address] CKD (chronic kidney disease) stage 4, GFR 15-29 ml/min ([address] Diabetic retinopathy ([address] Glaucoma, Hemodialysis patient ([address] Hyperkalemia, diminished renal excretion, Hyperlipidemia, Hypertension, Hypoglycemia unawareness in type 1 diabetes mellitus ([address] Insulin dependent type 2 diabetes mellitus ([address] Neuropathy, Osteomyelitis due to secondary diabetes [facility]), Patient is Mandaeism, Refusal of blood product, Ventricular hypertrophy, Ventricular tachycardia ([address] and Vitreous hemorrhage ([address]          Days since Admission: 8  Days on Ventilator : 6    Consults:   Nephrology  ID   Neurology, signed off    Cardiology     Assessment:      Neurology      AMS likely 2/2 Acute/subacute lacunar stroke vs metabolic encephalopathy vs septic shock   CT scan head wo contrast revealed small hypodense region right jerardo  Chest Wound cultures positive for proteus and serratia    MRI was considered for reevaluation of stroke, however due to the presence of a spinal stimulator, repeat CT scan was ordered  Repeat CT scan reveals small hypodensity located in right jerardo, similar to previous CT, not acute intracranial abnormality   EEG reveals diffuse slowing consistent with moderate to severe metabolic encephalopathy  Continue levophed  Hold Fentanyl 50 mcg/hr   Follow neurology recommendations  Brilinta and Asprin held due to low Hb, per cardiology      Cardiology    Acute Hypotension likely due to missed midodrine medication  Prior to second RRT, patient missed her midodrine dose  Restart Midodrine 10 mg TID PO  Bumex held due to low BP  Continue Levophed infusion  Consider adding Vasopressin 0.03 units/min if more vasopressor is needed  MAP goal over 65  Wean down Solu-Cortef 50 mg q8hr  Echo done in August 5 2022,  Ejection fraction 65%, reveals LV concentric hypertrophy   Inpatient cardiology consultation  Continue to monitor cardiac rhythm       Coronary artery disease status post stent in May 2022  Hold ticagrelor and aspirin  Continue Lipitor 80 mg nightly       History of hyperlipidemia  Continue Lipitor 80 mg nightly         History of heart failure with reduced ejection fraction, ejection fraction 60%, last echo was done in August 6/2022  Bumex held due to hypotension    Hx of hypotension  On midodrine       Pulmonology    Acute respiratory failure on admission 2/2 AMS, currently intubated   Patient on intubation day 5  ABG 7.45/26.4/121.8/18.2  Vent settings 350/14/5/40  PEEP dropped from 8 to 5   Monitor respiratory function  Monitor ABG      Renal    End-stage renal disease on hemodialysis 3 times a week  Has Left brachiocephalic fistula   Patient is unable to receive sustained low efficiency dialysis due to Low Hb  Possible transfer to [facility] or [facility] for dialysis   Uremic platelet  dysfunction and further bleeding      History of hyperuricemia  Continue allopurinol      Endocrine    History of type 2 diabetes mellitus  Home gil is 5 units Lantus nightly   Hold home dose  Monitor blood sugar levels  On LDSS        Infectious disease    Concern for sepsis/septic shock likely secondary to chest wound infection due to Proteus mirabilis and Serratia   Wound culture positive for Proteus mirabilis and Serratia marcescens  Pancytopenia during RRT code   Low body temperature during RRT  D/C  Zosyn  D/C ceftriaxone  D/C vancomycin  Continue doxycycline 100 mg q12h  Continue  Fluconazole 400mg IV once, followed by 200mg qday  Continue  Meropenem 1000 mg qday  Tip cultures for Femoral central line,sent  Repeat blood culture, no growth   MRSA Nares culture no growth   Follow infectious disease recommendations    Chest wound infection due to Mediport removal  Mediport removed 2/2 hardware infection in June 2022  Vitamin C and Zinc, and thiamine for wound care   Wound care consult      Hematology    Acute Normocytic anemia likely 2/2 unknown bleeding source vs pectoralis hematoma   Current Hb 4.8  Follow CBC every other day   FOBT test  CT scan wo contrast of chest reveal stable pectoralis hematoma   Minimize blood draws to every other day   Hold heparin ppx duet to bleeding risk   Continue Iron, EPO, vitamin C, thiamine, zinc, vitamin C, and B12 supplementation  Possible transfer to [facility] or [facility]       Thrombocytopenia  likely 2/2 unknown bleeding source vs pectoralis hematoma vs uremia  Platelet count 82  BUN levels 32  Consider adding  Ddavp for worsening bleeding if needed 2/2 uremic platelet dysfunction        Pancytopenia likely secondary to infection  Continue to monitor CBC, WBC count 9.1  Hb 4.8       Cannot receive blood products due to Confucianism believes    GI    CT scan of abdomen reveals likely enterocolitis vs ileus        Oncology     Hx of BRCA+ breast malignancy, s/p mastectomy   Right upper extremity lymphedema and diffuse anasarca       DVT ppx: Heparin held , sequential compression device   GI ppx: Protonix 40 mg qday        Code Status:   Full     Disposition: Continue current care, pending [facility] or [facility] MD, PGY-1    Attending physician: Dr. Tala Amaro  Department of Pulmonary, Critical Care and Sleep Medicine  [address] [facility]  Department of Internal Medicine      During multidisciplinary team rounds Annette Lea is a 78 y.o. female was seen, examined and discussed. This is confirmation that I have personally seen and examined the patient and that the key elements of the encounter were performed by me (> 85 % time). The medications & laboratory data was discussed and adjusted where necessary. The radiographic images were reviewed or with radiologist or consultant if felt dis-concordant with the exam or history. The above findings were corroborated, plans confirmed and changes made if needed. Family is updated at the bedside as available. Key issues of the case were discussed among consultants. Critical Care time is documented if appropriate.       Roderick Gastelum DO, FACP, FCCP, Korey gao,

## 2022-08-08 NOTE — CARE COORDINATION
8/8:  Update CM Note:  Pt presented to the ER for [facility] [address] [facility]. Pt was a RRT on 8/2 for AMS. Pt is a HD M-W-F  at [address]. Pt's sister Judith Patel confirmed the plan is for pt to return to [address] Pt is intubated, Iv Solu-cortef, Iv Folic Acid. Iv Diflucan, Iv Merrem, Iv Thiamine, Iv Protonix & Iv Levophed. Pt is a Jehovah Witness & plan to transfer to [facility] with bloodless medicine. Pt has been accepted at [address] [place] [address] or [facility]  pending a bed. WIll need Covid test.  Ambulance form place in soft chart. Sw/JOELLE will continue to follow for dc planning.  Electronically signed by Hollie Perez RN on 8/8/2022 at 9:37 AM

## 2022-08-08 NOTE — PROGRESS NOTES
Pharmacy Consultation Note  (Antibiotic Dosing and Monitoring)    Initial consult date: 8/5/22  Consulting physician/provider: Dr. Vanessa Ramírez  Drug: Vancomycin  Indication: CLABSI    Vancomycin has been discontinued   Clinical Pharmacy to sign-off  Physician to re-consult pharmacy if future dosing is needed    Thank you for the consult,    Margie Miller, PharmD, BCPS 8/8/2022 2:16 PM

## 2022-08-09 ENCOUNTER — APPOINTMENT (OUTPATIENT)
Dept: GENERAL RADIOLOGY | Age: 67
DRG: 064 | End: 2022-08-09
Payer: COMMERCIAL

## 2022-08-09 ENCOUNTER — APPOINTMENT (OUTPATIENT)
Dept: NUCLEAR MEDICINE | Age: 67
DRG: 064 | End: 2022-08-09
Payer: COMMERCIAL

## 2022-08-09 LAB
AADO2: 89.5 MMHG
ANION GAP SERPL CALCULATED.3IONS-SCNC: 9 MMOL/L (ref 7–16)
ANISOCYTOSIS: ABNORMAL
BASOPHILIC STIPPLING: ABNORMAL
BASOPHILS ABSOLUTE: 0 E9/L (ref 0–0.2)
BASOPHILS RELATIVE PERCENT: 0 % (ref 0–2)
BUN BLDV-MCNC: 46 MG/DL (ref 6–23)
CALCIUM SERPL-MCNC: 8.9 MG/DL (ref 8.6–10.2)
CHLORIDE BLD-SCNC: 100 MMOL/L (ref 98–107)
CO2: 20 MMOL/L (ref 22–29)
CREAT SERPL-MCNC: 4.4 MG/DL (ref 0.5–1)
CRITICAL: ABNORMAL
CULTURE CATHETER TIP: ABNORMAL
DATE ANALYZED: ABNORMAL
DATE OF COLLECTION: ABNORMAL
EOSINOPHILS ABSOLUTE: 0 E9/L (ref 0.05–0.5)
EOSINOPHILS RELATIVE PERCENT: 0.1 % (ref 0–6)
FERRITIN: 2459 NG/ML
FIO2: 40 %
GFR AFRICAN AMERICAN: 12
GFR NON-AFRICAN AMERICAN: 12 ML/MIN/1.73
GLUCOSE BLD-MCNC: 243 MG/DL (ref 74–99)
HCO3: 20.2 MMOL/L (ref 22–26)
HCT VFR BLD CALC: 13.1 % (ref 34–48)
HEMOGLOBIN: 4.3 G/DL (ref 11.5–15.5)
HYPOCHROMIA: ABNORMAL
IMMATURE RETIC FRACT: 18.5 % (ref 3–15.9)
IRON SATURATION: 153 % (ref 15–50)
IRON: 107 MCG/DL (ref 37–145)
LAB: ABNORMAL
LYMPHOCYTES ABSOLUTE: 0.32 E9/L (ref 1.5–4)
LYMPHOCYTES RELATIVE PERCENT: 4.3 % (ref 20–42)
Lab: ABNORMAL
MAGNESIUM: 1.9 MG/DL (ref 1.6–2.6)
MCH RBC QN AUTO: 27.7 PG (ref 26–35)
MCHC RBC AUTO-ENTMCNC: 32.8 % (ref 32–34.5)
MCV RBC AUTO: 84.5 FL (ref 80–99.9)
METER GLUCOSE: 162 MG/DL (ref 74–99)
METER GLUCOSE: 245 MG/DL (ref 74–99)
METER GLUCOSE: 267 MG/DL (ref 74–99)
METER GLUCOSE: 283 MG/DL (ref 74–99)
METER GLUCOSE: 285 MG/DL (ref 74–99)
METER GLUCOSE: 305 MG/DL (ref 74–99)
MODE: AC
MONOCYTES ABSOLUTE: 0 E9/L (ref 0.1–0.95)
MONOCYTES RELATIVE PERCENT: 4.3 % (ref 2–12)
NEUTROPHILS ABSOLUTE: 7.78 E9/L (ref 1.8–7.3)
NEUTROPHILS RELATIVE PERCENT: 95.7 % (ref 43–80)
NUCLEATED RED BLOOD CELLS: 3.5 /100 WBC
OPERATOR ID: ABNORMAL
ORGANISM: ABNORMAL
OVALOCYTES: ABNORMAL
PATIENT TEMP: 37 C
PCO2: 27.7 MMHG (ref 35–45)
PDW BLD-RTO: 17.8 FL (ref 11.5–15)
PEEP/CPAP: 5 CMH2O
PFO2: 3.85 MMHG/%
PH BLOOD GAS: 7.48 (ref 7.35–7.45)
PHOSPHORUS: 3.1 MG/DL (ref 2.5–4.5)
PLATELET # BLD: 77 E9/L (ref 130–450)
PLATELET CONFIRMATION: NORMAL
PMV BLD AUTO: ABNORMAL FL (ref 7–12)
PO2: 153.8 MMHG (ref 75–100)
POIKILOCYTES: ABNORMAL
POLYCHROMASIA: ABNORMAL
POTASSIUM SERPL-SCNC: 3.9 MMOL/L (ref 3.5–5)
RBC # BLD: 1.55 E12/L (ref 3.5–5.5)
RETIC HGB EQUIVALENT: 25.9 PG (ref 28.2–36.6)
RETICULOCYTE ABSOLUTE COUNT: 0.1 E12/L
RETICULOCYTE COUNT PCT: 6.6 % (ref 0.4–1.9)
RI(T): 0.58
RR MECHANICAL: 14 B/MIN
SARS-COV-2, PCR: NOT DETECTED
SODIUM BLD-SCNC: 129 MMOL/L (ref 132–146)
SOURCE, BLOOD GAS: ABNORMAL
THB: <5 G/DL (ref 11.5–16.5)
TIME ANALYZED: 437
TOTAL IRON BINDING CAPACITY: 70 MCG/DL (ref 250–450)
VT MECHANICAL: 350 ML
WBC # BLD: 8.1 E9/L (ref 4.5–11.5)

## 2022-08-09 PROCEDURE — 3430000000 HC RX DIAGNOSTIC RADIOPHARMACEUTICAL: Performed by: RADIOLOGY

## 2022-08-09 PROCEDURE — 6360000002 HC RX W HCPCS: Performed by: INTERNAL MEDICINE

## 2022-08-09 PROCEDURE — 6370000000 HC RX 637 (ALT 250 FOR IP): Performed by: INTERNAL MEDICINE

## 2022-08-09 PROCEDURE — 2500000003 HC RX 250 WO HCPCS: Performed by: INTERNAL MEDICINE

## 2022-08-09 PROCEDURE — 6370000000 HC RX 637 (ALT 250 FOR IP): Performed by: FAMILY MEDICINE

## 2022-08-09 PROCEDURE — A4216 STERILE WATER/SALINE, 10 ML: HCPCS | Performed by: INTERNAL MEDICINE

## 2022-08-09 PROCEDURE — 82803 BLOOD GASES ANY COMBINATION: CPT

## 2022-08-09 PROCEDURE — 78278 ACUTE GI BLOOD LOSS IMAGING: CPT | Performed by: RADIOLOGY

## 2022-08-09 PROCEDURE — 2580000003 HC RX 258: Performed by: INTERNAL MEDICINE

## 2022-08-09 PROCEDURE — 85025 COMPLETE CBC W/AUTO DIFF WBC: CPT

## 2022-08-09 PROCEDURE — C9113 INJ PANTOPRAZOLE SODIUM, VIA: HCPCS | Performed by: INTERNAL MEDICINE

## 2022-08-09 PROCEDURE — 94003 VENT MGMT INPAT SUBQ DAY: CPT

## 2022-08-09 PROCEDURE — 82962 GLUCOSE BLOOD TEST: CPT

## 2022-08-09 PROCEDURE — 99233 SBSQ HOSP IP/OBS HIGH 50: CPT | Performed by: INTERNAL MEDICINE

## 2022-08-09 PROCEDURE — 5A1D90Z PERFORMANCE OF URINARY FILTRATION, CONTINUOUS, GREATER THAN 18 HOURS PER DAY: ICD-10-PCS | Performed by: INTERNAL MEDICINE

## 2022-08-09 PROCEDURE — 90945 DIALYSIS ONE EVALUATION: CPT

## 2022-08-09 PROCEDURE — 6370000000 HC RX 637 (ALT 250 FOR IP): Performed by: SPECIALIST

## 2022-08-09 PROCEDURE — 85045 AUTOMATED RETICULOCYTE COUNT: CPT

## 2022-08-09 PROCEDURE — 78278 ACUTE GI BLOOD LOSS IMAGING: CPT

## 2022-08-09 PROCEDURE — 82728 ASSAY OF FERRITIN: CPT

## 2022-08-09 PROCEDURE — 80048 BASIC METABOLIC PNL TOTAL CA: CPT

## 2022-08-09 PROCEDURE — 02HV33Z INSERTION OF INFUSION DEVICE INTO SUPERIOR VENA CAVA, PERCUTANEOUS APPROACH: ICD-10-PCS | Performed by: SURGERY

## 2022-08-09 PROCEDURE — 83550 IRON BINDING TEST: CPT

## 2022-08-09 PROCEDURE — 83735 ASSAY OF MAGNESIUM: CPT

## 2022-08-09 PROCEDURE — 2500000003 HC RX 250 WO HCPCS

## 2022-08-09 PROCEDURE — 83540 ASSAY OF IRON: CPT

## 2022-08-09 PROCEDURE — 71045 X-RAY EXAM CHEST 1 VIEW: CPT

## 2022-08-09 PROCEDURE — 2000000000 HC ICU R&B

## 2022-08-09 PROCEDURE — 84100 ASSAY OF PHOSPHORUS: CPT

## 2022-08-09 PROCEDURE — A9560 TC99M LABELED RBC: HCPCS | Performed by: RADIOLOGY

## 2022-08-09 RX ORDER — MAGNESIUM SULFATE 1 G/100ML
1000 INJECTION INTRAVENOUS PRN
Status: DISCONTINUED | OUTPATIENT
Start: 2022-08-09 | End: 2022-08-11 | Stop reason: HOSPADM

## 2022-08-09 RX ORDER — ANTICOAGULANT SODIUM CITRATE SOLUTION 4 G/100ML
SOLUTION INTRAVENOUS CONTINUOUS
Status: DISCONTINUED | OUTPATIENT
Start: 2022-08-09 | End: 2022-08-11 | Stop reason: HOSPADM

## 2022-08-09 RX ORDER — POTASSIUM CHLORIDE 29.8 MG/ML
20 INJECTION INTRAVENOUS PRN
Status: DISCONTINUED | OUTPATIENT
Start: 2022-08-09 | End: 2022-08-11 | Stop reason: HOSPADM

## 2022-08-09 RX ADMIN — CYANOCOBALAMIN 1000 MCG: 1000 INJECTION, SOLUTION INTRAMUSCULAR at 08:50

## 2022-08-09 RX ADMIN — HYDROCORTISONE SODIUM SUCCINATE 50 MG: 100 INJECTION, POWDER, FOR SOLUTION INTRAMUSCULAR; INTRAVENOUS at 10:17

## 2022-08-09 RX ADMIN — INSULIN LISPRO 2 UNITS: 100 INJECTION, SOLUTION INTRAVENOUS; SUBCUTANEOUS at 17:51

## 2022-08-09 RX ADMIN — HYDROCORTISONE SODIUM SUCCINATE 50 MG: 100 INJECTION, POWDER, FOR SOLUTION INTRAMUSCULAR; INTRAVENOUS at 02:18

## 2022-08-09 RX ADMIN — MIDODRINE HYDROCHLORIDE 10 MG: 10 TABLET ORAL at 17:50

## 2022-08-09 RX ADMIN — THIAMINE HYDROCHLORIDE 250 MG: 100 INJECTION, SOLUTION INTRAMUSCULAR; INTRAVENOUS at 09:02

## 2022-08-09 RX ADMIN — ANTICOAGULANT SODIUM CITRATE SOLUTION: 4 SOLUTION INTRAVENOUS at 22:25

## 2022-08-09 RX ADMIN — BRIMONIDINE TARTRATE 1 DROP: 2 SOLUTION OPHTHALMIC at 20:25

## 2022-08-09 RX ADMIN — HYDROCORTISONE SODIUM SUCCINATE 50 MG: 100 INJECTION, POWDER, FOR SOLUTION INTRAMUSCULAR; INTRAVENOUS at 22:53

## 2022-08-09 RX ADMIN — ALLOPURINOL 100 MG: 100 TABLET ORAL at 08:48

## 2022-08-09 RX ADMIN — Medication: at 22:25

## 2022-08-09 RX ADMIN — PETROLATUM: 420 OINTMENT TOPICAL at 08:53

## 2022-08-09 RX ADMIN — DOXYCYCLINE HYCLATE 100 MG: 100 CAPSULE ORAL at 20:25

## 2022-08-09 RX ADMIN — INSULIN LISPRO 4 UNITS: 100 INJECTION, SOLUTION INTRAVENOUS; SUBCUTANEOUS at 20:30

## 2022-08-09 RX ADMIN — SODIUM CHLORIDE, PRESERVATIVE FREE 10 ML: 5 INJECTION INTRAVENOUS at 08:54

## 2022-08-09 RX ADMIN — BRIMONIDINE TARTRATE 1 DROP: 2 SOLUTION OPHTHALMIC at 08:48

## 2022-08-09 RX ADMIN — PETROLATUM: 420 OINTMENT TOPICAL at 20:25

## 2022-08-09 RX ADMIN — SODIUM CHLORIDE, PRESERVATIVE FREE 10 ML: 5 INJECTION INTRAVENOUS at 20:27

## 2022-08-09 RX ADMIN — FLUCONAZOLE IN SODIUM CHLORIDE 200 MG: 2 INJECTION, SOLUTION INTRAVENOUS at 18:14

## 2022-08-09 RX ADMIN — MIDODRINE HYDROCHLORIDE 10 MG: 10 TABLET ORAL at 07:51

## 2022-08-09 RX ADMIN — EPOETIN ALFA-EPBX 40000 UNITS: 40000 INJECTION, SOLUTION INTRAVENOUS; SUBCUTANEOUS at 17:51

## 2022-08-09 RX ADMIN — FOLIC ACID 1 MG: 5 INJECTION, SOLUTION INTRAMUSCULAR; INTRAVENOUS; SUBCUTANEOUS at 08:51

## 2022-08-09 RX ADMIN — SODIUM CHLORIDE 40 MG: 9 INJECTION INTRAMUSCULAR; INTRAVENOUS; SUBCUTANEOUS at 20:25

## 2022-08-09 RX ADMIN — 0.12% CHLORHEXIDINE GLUCONATE 15 ML: 1.2 RINSE ORAL at 08:49

## 2022-08-09 RX ADMIN — MEROPENEM 1000 MG: 1 INJECTION, POWDER, FOR SOLUTION INTRAVENOUS at 14:24

## 2022-08-09 RX ADMIN — SODIUM CHLORIDE, PRESERVATIVE FREE 10 ML: 5 INJECTION INTRAVENOUS at 20:28

## 2022-08-09 RX ADMIN — OXYCODONE HYDROCHLORIDE AND ACETAMINOPHEN 500 MG: 500 TABLET ORAL at 09:02

## 2022-08-09 RX ADMIN — Medication 27.4 MILLICURIE: at 15:35

## 2022-08-09 RX ADMIN — Medication 2 MCG/MIN: at 23:26

## 2022-08-09 RX ADMIN — LATANOPROST 1 DROP: 50 SOLUTION OPHTHALMIC at 20:25

## 2022-08-09 RX ADMIN — DOXYCYCLINE HYCLATE 100 MG: 100 CAPSULE ORAL at 09:02

## 2022-08-09 RX ADMIN — ATORVASTATIN CALCIUM 80 MG: 40 TABLET, FILM COATED ORAL at 20:25

## 2022-08-09 RX ADMIN — MIDODRINE HYDROCHLORIDE 10 MG: 10 TABLET ORAL at 11:47

## 2022-08-09 RX ADMIN — SODIUM CHLORIDE 40 MG: 9 INJECTION INTRAMUSCULAR; INTRAVENOUS; SUBCUTANEOUS at 08:53

## 2022-08-09 RX ADMIN — GABAPENTIN 100 MG: 100 CAPSULE ORAL at 20:25

## 2022-08-09 RX ADMIN — Medication 50 MG: at 09:03

## 2022-08-09 RX ADMIN — 0.12% CHLORHEXIDINE GLUCONATE 15 ML: 1.2 RINSE ORAL at 20:25

## 2022-08-09 RX ADMIN — CALCIUM CHLORIDE 8000 MG: 100 INJECTION, SOLUTION INTRAVENOUS at 22:16

## 2022-08-09 RX ADMIN — SODIUM CHLORIDE 125 MG: 9 INJECTION, SOLUTION INTRAVENOUS at 17:10

## 2022-08-09 RX ADMIN — INSULIN LISPRO 2 UNITS: 100 INJECTION, SOLUTION INTRAVENOUS; SUBCUTANEOUS at 07:54

## 2022-08-09 ASSESSMENT — PAIN SCALES - GENERAL
PAINLEVEL_OUTOF10: 0

## 2022-08-09 ASSESSMENT — PULMONARY FUNCTION TESTS
PIF_VALUE: 23
PIF_VALUE: 19
PIF_VALUE: 21
PIF_VALUE: 22
PIF_VALUE: 20
PIF_VALUE: 37
PIF_VALUE: 27
PIF_VALUE: 18
PIF_VALUE: 18
PIF_VALUE: 20
PIF_VALUE: 17
PIF_VALUE: 22
PIF_VALUE: 19
PIF_VALUE: 18
PIF_VALUE: 20
PIF_VALUE: 19
PIF_VALUE: 22
PIF_VALUE: 21
PIF_VALUE: 27
PIF_VALUE: 21
PIF_VALUE: 21
PIF_VALUE: 18
PIF_VALUE: 28
PIF_VALUE: 19

## 2022-08-09 NOTE — PLAN OF CARE
Problem: Respiratory - Adult  Goal: Achieves optimal ventilation and oxygenation  8/9/2022 1113 by Don Munoz RCP  Outcome: Progressing

## 2022-08-09 NOTE — PROGRESS NOTES
[address] [place] [address] [facility]  Progress Note      C/C : resp failure      SUBJECTIVE:  Patient is tolerating medications. No reported adverse drug reactions. No nausea, vomiting, diarrhea.   Afebrile  Low dose levophed ( 3 mcg)   Off vasopressin   Sedation removed in the form of fentanyl 8/7/2022  Family present  Review of systems:  Could not be obtained     Medications:  Scheduled Meds:   pantoprazole (PROTONIX) 40 mg injection  40 mg IntraVENous Q12H    ferric gluconate (FERRLECIT) IVPB  125 mg IntraVENous Once    cyanocobalamin  1,000 mcg IntraMUSCular Daily    midodrine  10 mg Oral TID WC    epoetin fani-epbx  3,000 Units SubCUTAneous Once per day on Mon Wed Fri    hydrocortisone sodium succinate PF  50 mg IntraVENous Q8H    insulin lispro  0-4 Units SubCUTAneous TID WC    insulin lispro  0-4 Units SubCUTAneous Nightly    folic acid  1 mg IntraVENous Daily    ascorbic acid  500 mg Oral Daily    zinc sulfate  50 mg Oral Daily    thiamine  250 mg IntraVENous Daily    meropenem  1,000 mg IntraVENous Q24H    fluconazole  200 mg IntraVENous Q24H    chlorhexidine  15 mL Mouth/Throat BID    [Held by provider] aspirin  81 mg Oral Daily    doxycycline hyclate  100 mg Oral 2 times per day    gabapentin  100 mg Oral Nightly    sodium chloride flush  5-40 mL IntraVENous 2 times per day    allopurinol  100 mg Oral Daily    atorvastatin  80 mg Oral Nightly    brimonidine  1 drop Right Eye BID    [Held by provider] bumetanide  2 mg Oral Once per day on Sun Tue Thu    lidocaine-prilocaine  1 Dose Topical See Admin Instructions    latanoprost  1 drop Both Eyes Nightly    [Held by provider] sertraline  25 mg Oral Daily    [Held by provider] ticagrelor  90 mg Oral BID    white petrolatum   Topical BID    [Held by provider] heparin (porcine)  5,000 Units SubCUTAneous Q8H     Continuous Infusions:   fentaNYL Stopped (08/07/22 1045)    dextrose      norepinephrine 3 mcg/min (08/08/22 4582)    sodium chloride 10 mL/hr at 22 0700     PRN Meds:perflutren lipid microspheres, glucose, dextrose bolus **OR** dextrose bolus, glucagon (rDNA), dextrose, white petrolatum, sodium chloride flush, sodium chloride, ondansetron **OR** ondansetron, polyethylene glycol, acetaminophen **OR** acetaminophen, [Held by provider] oxyCODONE-acetaminophen    OBJECTIVE:  BP (!) 130/51   Pulse 60   Temp 96.8 °F (36 °C) (Oral)   Resp 14   Ht 5' 10\" (1.778 m)   Wt 150 lb 9.2 oz (68.3 kg)   SpO2 98%   BMI 21.61 kg/m²   Temp  Av.7 °F (36.5 °C)  Min: 96.2 °F (35.7 °C)  Max: 99.3 °F (37.4 °C)  Constitutional: The patient is intubated, opened eyes, sedated   Skin: Warm and dry. No rashes were noted. 2022 chest    2022 coccyx  HEENT: Round and reactive pupils. Moist mucous membranes. No ulcerations or thrush. Neck: Supple to movements. Chest: No use of accessory muscles to breathe. Symmetrical expansion. No wheezing, crackles or rhonchi. Cardiovascular: S1 and S2 are rhythmic and regular. No murmurs appreciated. Abdomen: Positive bowel sounds to auscultation. Benign to palpation. No masses felt. No hepatosplenomegaly. Genitourinary: Gant  Extremities: No clubbing, no cyanosis, bilateral upper and lower extremity edema.   Lines: peripheral  8/3/2022 right femoral art line  2022 left femoral triple-lumen catheter changed on 2022 the left subclavian    Laboratory and Tests Review:  Lab Results   Component Value Date    WBC 8.1 2022    WBC 9.1 2022    WBC 9.8 2022    HGB 4.3 (LL) 2022    HCT 13.1 (LL) 2022    MCV 84.5 2022    PLT 77 (L) 2022     Lab Results   Component Value Date    NEUTROABS 7.78 (H) 2022    NEUTROABS 8.28 (H) 2022    NEUTROABS 9.31 (H) 2022     Lab Results   Component Value Date    CRPHS 0.7 2016    CRPHS 2.6 2016    CRPHS 7.6 (H) 2015     Lab Results   Component Value Date    ALT 62 (H) 2022    AST 65 (H) 08/02/2022    ALKPHOS 161 (H) 08/02/2022    BILITOT 0.3 08/02/2022     Lab Results   Component Value Date/Time     08/09/2022 04:07 AM    K 3.9 08/09/2022 04:07 AM    K 3.1 08/02/2022 06:25 AM     08/09/2022 04:07 AM    CO2 20 08/09/2022 04:07 AM    BUN 46 08/09/2022 04:07 AM    CREATININE 4.4 08/09/2022 04:07 AM    CREATININE 3.5 08/07/2022 04:11 AM    CREATININE 3.3 08/06/2022 08:40 AM    GFRAA 12 08/09/2022 04:07 AM    LABGLOM 12 08/09/2022 04:07 AM    GLUCOSE 243 08/09/2022 04:07 AM    GLUCOSE 46 04/02/2012 11:00 AM    PROT 5.1 08/02/2022 01:15 AM    LABALBU 2.8 08/02/2022 01:15 AM    LABALBU 3.8 04/02/2012 11:00 AM    CALCIUM 8.9 08/09/2022 04:07 AM    BILITOT 0.3 08/02/2022 01:15 AM    ALKPHOS 161 08/02/2022 01:15 AM    AST 65 08/02/2022 01:15 AM    ALT 62 08/02/2022 01:15 AM     Lab Results   Component Value Date    CRP 1.4 (H) 07/11/2022    CRP 7.7 (H) 04/13/2022    CRP 0.7 (H) 01/20/2022     Lab Results   Component Value Date    SEDRATE 30 (H) 06/15/2022    SEDRATE 81 (H) 04/13/2022    SEDRATE 19 01/20/2022     Radiology:    CT scan chest -  Impression     A left subclavian catheter which is looped in the subclavian vein with the   tip at the level of innominate vein with the adjacent air bubbles as noted. There is also hematoma in the left pectoralis muscle and axilla. Persistent atelectasis/infiltrates and pleural effusion in the lung bases. Diffuse anasarca. Chronic osteomyelitis discitis complex at L1-L2, with an indwelling spinal   stimulator.      Microbiology:   Lab Results   Component Value Date/Time    BC 24 Hours no growth 08/06/2022 08:08 AM    BC 5 Days no growth 08/01/2022 12:33 PM    BC 5 Days no growth 07/11/2022 02:05 PM    ORG Staphylococcus coagulase-negative 08/07/2022 07:49 PM    ORG Proteus mirabilis 08/03/2022 06:39 PM    ORG Serratia marcescens 08/03/2022 06:39 PM     Lab Results   Component Value Date/Time    BLOODCULT2 24 Hours no growth 08/06/2022 08:08 AM BLOODCULT2 5 Days no growth 08/01/2022 12:43 PM    BLOODCULT2 5 Days no growth 07/11/2022 02:05 PM    ORG Staphylococcus coagulase-negative 08/07/2022 07:49 PM    ORG Proteus mirabilis 08/03/2022 06:39 PM    ORG Serratia marcescens 08/03/2022 06:39 PM     WOUND/ABSCESS   Date Value Ref Range Status   08/03/2022 Moderate growth  Final   08/03/2022 Heavy growth  Final   12/20/2021 Heavy growth  Final   12/20/2021 Light growth  Final     Smear, Respiratory   Date Value Ref Range Status   06/16/2022   Final    Few Polymorphonuclear leukocytes  Epithelial cells not seen  Moderate Gram positive cocci in pairs       No results found for: MPNEUMO, CLAMYDCU, LABLEGI, AFBCX, FUNGSM, LABFUNG  CULTURE, RESPIRATORY   Date Value Ref Range Status   06/16/2022 Oral Pharyngeal Cas reduced  Final     Culture Catheter Tip   Date Value Ref Range Status   08/07/2022 <15 colonies  Final     No results found for: BFCS  Culture Surgical   Date Value Ref Range Status   04/18/2022 Growth not present  Final     Urine Culture, Routine   Date Value Ref Range Status   11/07/2017 Growth not present  Final   09/22/2014   Final    ,000 CFU/mL  Mixed cas isolated. Further workup and sensitivity testing  is not routinely indicated and will not be performed.   Mixed cas isolated includes:  Mixed gram positive organisms  Gram negative rods       MRSA Culture Only   Date Value Ref Range Status   08/04/2022 Methicillin resistant Staph aureus not isolated  Final   07/11/2022 Methicillin resistant Staph aureus not isolated  Final   10/04/2014 Methicillin resistant Staph aureus not isolated  Final     Microbiology   8/7/2022 catheter tip -  8/6/2022 blood culture negative x2  8/1/2022 blood cultures x2 negative  8/4/2022 respiratory nasal swab negative for MRSA  8/3/2022 wound on the chest Proteus and Serratia         Specimen: Chest Updated: 08/05/22 1010    Organism Proteus mirabilis Abnormal     WOUND/ABSCESS Moderate growth    Organism Serratia marcescens Abnormal     WOUND/ABSCESS Heavy growth   Narrative:     Source: CHEST       Site: Right             Culture, Urine [[id number]]            ASSESSMENT:    New acute lacunar infarct  Treated for vertebral osteomyelitis and now on suppressive doxycycline  Rule out aspiration pneumonia-chest x-ray -is clear and the CT scan are more consistent with fluid in the lower bases  Neutropenia-improved  Chest and sacral wounds are stable, do not appear infected and colonized  Reviewed cultures from [address] [facility] as well as [address] may be infected by fecal contamination  Resp failure - intubated     PLAN:  Continue doxycycline suppressive, meropenem 1 gram IV q day , diflucan 200 mg IV q day ending any positive cultures from the fecal contamination of the dressings in the groin  Blood cultures  Follow clinically  Check final cultures  Alaina Workman MD  11:56 AM  8/9/2022

## 2022-08-09 NOTE — CONSULTS
Vascular Surgery Consultation Note    Reason for Consult:  dialysis access    Chief complaint:  Chief Complaint   Patient presents with    Altered Mental Status     LKW unknown. From dialysis, originally from [facility]. Sent in from dialysis d/t slow responses. Had full treatment. BGL 88       Hypotension and AMS    HPI :    This is a 78 y.o. female who is admitted to the hospital for treatment of hypotension and AMS for whom vascular surgery was consulted for ESRD - HD Dependent. The patient has remained in the ICU intubated and sedated. Notably she is a Johovah's witness and does not accept blood. Family wishes to pursue transfer to [facility]. Awaiting transfer. In meantime, needs dialysis access. Vascular surgery consulted for this problem. History obtained by staff and EMR. ROS :   Unable to obtain 2/2 patient status    Past Medical History:   Diagnosis Date    Acute CVA (cerebrovascular accident) ([address] 8/3/2022    Acute infection of bone ([address]     infection of rt foot, resolved.     Acute osteomyelitis of phalanx of left hand ([address] 1/27/2022    Left third distal phalanx    Anemia of chronic disease     Arthritis     Breast cancer ([address]     right breast, 2008/ bladder, 2006- last chemotherapy \"years ago\"    CAD (coronary artery disease)     Carpal tunnel syndrome     bilat - for OR left hand 3-17-20     Chronic diastolic CHF (congestive heart failure) ([address] 09/23/2014 9/23/14- echocardiogram revealed moderate LV concentric hypertrophy, stage III diastolic dysfunction, mild tricuspid regurgitation    CKD (chronic kidney disease) stage 4, GFR 15-29 ml/min ([facility])     Diabetic retinopathy ([address]     Glaucoma     Hemodialysis patient ([address]     [facility] wed fri- Dr. Mendoza Pearl - left arm fistula     Hyperkalemia, diminished renal excretion 11/9/2017    Hyperlipidemia     Hypertension     Hypoglycemia unawareness in type 1 diabetes mellitus ([address] 11/7/2017    Insulin dependent type 2 diabetes mellitus ([address] Neuropathy     feet    Osteomyelitis due to secondary diabetes [facility]     rt great toe with amputation    Patient is Jainism 11/7/2017    Refusal of blood product     patient states she dose not take blood transfusion    Ventricular hypertrophy     Ventricular tachycardia ([facility] 5/24/2021    Vitreous hemorrhage ([facility]     left eye        Past Surgical History:   Procedure Laterality Date    AMPUTATION      right great toe    ANKLE SURGERY      correction on charcot joint of right ankle    BONE BIOPSY N/A 04/18/2022    BONE BIOPSY PERCUTANEOUS L1/L2 performed by Ramirez Kelly MD at [address]  12/09/2021    Dr Jaswinder Hyman Left 03/17/2020    LEFT CARPAL TUNNEL RELEASE performed by Diana Cardenas MD at [address]      bilateral    CHOLECYSTECTOMY      COLONOSCOPY      CORONARY ANGIOPLASTY  05/05/2022    Dr. Fredy Ross - RCA angioplasty    [address] Left 01/31/2018    upper arm/Dr. Jesus Pagan    ECHO COMPL W DOP COLOR FLOW  02/14/2013         ECHO COMPLETE  09/17/2013         FINGER AMPUTATION Left 01/20/2022    AMPUTATION OF LEFT THIRD DIGIT, DISTAL PHALANX performed by Robbin Del Rio MD at [facility]      right    OTHER SURGICAL HISTORY  09/27/2011    PPV, membranectomy, laser Right eye    OTHER SURGICAL HISTORY  insertion lumbar drain insertion    10/12/`14    OTHER SURGICAL HISTORY  10/22/2015    percutaneous lead placement for spinal cord stimulator    OTHER SURGICAL HISTORY  11/03/2015    Spinal; cord stimulator- turned off as of 3-10-20     PORT SURGERY N/A 6/27/2022    PORT REMOVAL performed by Cali Mark MD at [facility] AV ANAST,UP ARM BASILIC VEIN TRANSPOSIT Left 05/15/2018    TRANSPOSITION STAGE II AV FISTULA - LEFT UPPER ARM performed by Lily Linton MD at [address] Left 09/25/2018    SUPERFICIALIZATION AV FISTULA - LEFT ARM performed by Reggie Love Tre Lemus MD at [address] [facility] W/VITRECTOMY ANY METH Left 04/10/2018    PARS PLANA VITRECTOMY 25 GAUGE RETINAL DETACHMENT REPAIR air fluid exchange, endolaser performed by Luis Roman MD at [address]      L2    TRANSESOPHAGEAL ECHOCARDIOGRAM  11/11/2021    Dr. Drake Runner    TRANSESOPHAGEAL ECHOCARDIOGRAM  04/19/2022        TUNNELED VENOUS CATHETER PLACEMENT  11/15/2017    VITRECTOMY Left 04/10/2018    PARS PLANA VITRECTOMY; RETINAL DETACHMENT REPAIR; GAS BUBBLE; LASER LEFT EYE       Current Medications:    dialysis builder      prismaSol BGK 4/0/1.2      anticoagulant sodium citrate      And    calcium chloride CRRT infusion      fentaNYL Stopped (08/07/22 1045)    dextrose      norepinephrine 3 mcg/min (08/09/22 1555)    sodium chloride Stopped (08/09/22 1424)      magnesium sulfate, potassium chloride, calcium gluconate **OR** calcium gluconate **OR** calcium gluconate **OR** calcium gluconate, sodium phosphate IVPB **OR** sodium phosphate IVPB **OR** sodium phosphate IVPB **OR** sodium phosphate IVPB, perflutren lipid microspheres, glucose, dextrose bolus **OR** dextrose bolus, glucagon (rDNA), dextrose, white petrolatum, sodium chloride flush, sodium chloride, ondansetron **OR** ondansetron, polyethylene glycol, acetaminophen **OR** acetaminophen, [Held by provider] oxyCODONE-acetaminophen    pantoprazole (PROTONIX) 40 mg injection  40 mg IntraVENous Q12H    hydrocortisone sodium succinate PF  50 mg IntraVENous Q12H    [START ON 8/10/2022] epoetin fani-epbx  40,000 Units SubCUTAneous Daily    ferric gluconate (FERRLECIT) IVPB  125 mg IntraVENous Once    cyanocobalamin  1,000 mcg IntraMUSCular Daily    midodrine  10 mg Oral TID WC    [Held by provider] epoetin fani-epbx  3,000 Units SubCUTAneous Once per day on Mon Wed Fri    insulin lispro  0-4 Units SubCUTAneous TID WC    insulin lispro  0-4 Units SubCUTAneous Nightly    folic acid  1 mg IntraVENous Daily ascorbic acid  500 mg Oral Daily    zinc sulfate  50 mg Oral Daily    thiamine  250 mg IntraVENous Daily    meropenem  1,000 mg IntraVENous Q24H    fluconazole  200 mg IntraVENous Q24H    chlorhexidine  15 mL Mouth/Throat BID    [Held by provider] aspirin  81 mg Oral Daily    doxycycline hyclate  100 mg Oral 2 times per day    gabapentin  100 mg Oral Nightly    sodium chloride flush  5-40 mL IntraVENous 2 times per day    allopurinol  100 mg Oral Daily    atorvastatin  80 mg Oral Nightly    brimonidine  1 drop Right Eye BID    [Held by provider] bumetanide  2 mg Oral Once per day on Sun Tue Thu    lidocaine-prilocaine  1 Dose Topical See Admin Instructions    latanoprost  1 drop Both Eyes Nightly    [Held by provider] sertraline  25 mg Oral Daily    [Held by provider] ticagrelor  90 mg Oral BID    white petrolatum   Topical BID    [Held by provider] heparin (porcine)  5,000 Units SubCUTAneous Q8H        Home Medications:  Prior to Admission medications    Medication Sig Start Date End Date Taking? Authorizing Provider   atorvastatin (LIPITOR) 80 MG tablet Take 80 mg by mouth at bedtime   Yes Historical Provider, MD   bisacodyl (DULCOLAX) 10 MG suppository Place 10 mg rectally daily as needed for Constipation   Yes Historical Provider, MD   gabapentin (NEURONTIN) 100 MG capsule Take 100 mg by mouth in the morning and 100 mg at noon and 100 mg before bedtime. Yes Historical Provider, MD   magnesium hydroxide (MILK OF MAGNESIA) 400 MG/5ML suspension Take 30 mLs by mouth daily as needed for Constipation   Yes Historical Provider, MD   vancomycin (VANCOCIN) 1000 MG/200ML SOLN Infuse 1,000 mg intravenously every 72 hours   Yes Historical Provider, MD   ondansetron (ZOFRAN) 4 MG tablet Take 4 mg by mouth every 6 hours as needed for Nausea or Vomiting   Yes Historical Provider, MD   sertraline (ZOLOFT) 25 MG tablet Take 25 mg by mouth in the morning.  7/26/22 8/2/22 Yes Historical Provider, MD   polyethylene glycol (GLYCOLAX) 17 g packet Take 17 g by mouth in the morning. 7/22/22 8/21/22  Jag Jhaevri MD   senna (SENOKOT) 8.6 MG tablet Take 1 tablet by mouth nightly as needed for Constipation 7/21/22 8/20/22  Jag Jhaveri MD   brimonidine (ALPHAGAN) 0.2 % ophthalmic solution Place 1 drop into the right eye in the morning and 1 drop before bedtime. 7/21/22   Jag Jhaveri MD   midodrine (PROAMATINE) 10 MG tablet Take 1 tablet by mouth in the morning and 1 tablet at noon and 1 tablet in the evening. Take with meals. 7/21/22   Jag Jhaveri MD   acetaminophen (TYLENOL) 325 MG tablet Take 650 mg by mouth every 4 hours as needed for Pain    Historical Provider, MD   Acidophilus Lactobacillus CAPS Take 1 capsule by mouth every morning    Historical Provider, MD   aspirin 81 MG EC tablet Take 81 mg by mouth every morning    Historical Provider, MD   meclizine (ANTIVERT) 12.5 MG tablet Take 12.5 mg by mouth every 8 hours as needed for Dizziness    Historical Provider, MD   mirtazapine (REMERON) 7.5 MG tablet Take 7.5 mg by mouth nightly    Historical Provider, MD   oxyCODONE-acetaminophen (PERCOCET) 5-325 MG per tablet Take 2 tablets by mouth every 8 hours as needed for Pain.     Historical Provider, MD   ZINC OXIDE, TOPICAL, (SECURA PROTECTIVE) 10 % CREA Apply 1 Dose topically 2 times daily Apply to buttocks    Historical Provider, MD   bumetanide (BUMEX) 2 MG tablet Take 2 mg by mouth See Admin Instructions Given Sunday,Tuesday,Thursday,    Historical Provider, MD   lidocaine-prilocaine (EMLA) 2.5-2.5 % cream Apply 1 Dose topically See Admin Instructions Given Vinnie Bombard prior to Dialysis    Historical Provider, MD   metoprolol succinate (TOPROL XL) 25 MG extended release tablet Take 1 tablet by mouth daily 11/24/21 7/21/22  Mayuri Hylton MD   allopurinol (ZYLOPRIM) 100 MG tablet Take 1 tablet by mouth daily 9/7/21   Mayuri Hylton MD   ticagrelor (BRILINTA) 90 MG TABS tablet Take 1 tablet by mouth 2 times daily 9/7/21   Susanna Jackson MD   lanthanum (FOSRENOL) 1000 MG chewable tablet Take 1,000 mg by mouth 3 times daily (with meals)  8/9/21 7/21/22  Historical Provider, MD   B Complex-C-Folic Acid (BONI CAPS) 1 MG CAPS Take 1 mg by mouth nightly     Historical Provider, MD   omeprazole (PRILOSEC) 20 MG delayed release capsule Take 20 mg by mouth daily as needed (gerd)    Historical Provider, MD MCHUGH 0.01 % SOLN ophthalmic drops Place 1 drop into the right eye nightly  6/9/20   Historical Provider, MD       Allergies:  Furosemide    Social History     Socioeconomic History    Marital status: Single     Spouse name: Not on file    Number of children: Not on file    Years of education: Not on file    Highest education level: Not on file   Occupational History    Not on file   Tobacco Use    Smoking status: Never    Smokeless tobacco: Never   Vaping Use    Vaping Use: Never used   Substance and Sexual Activity    Alcohol use: No    Drug use: No    Sexual activity: Not Currently   Other Topics Concern    Not on file   Social History Narrative    Not on file     Social Determinants of Health     Financial Resource Strain: Not on file   Food Insecurity: Not on file   Transportation Needs: Not on file   Physical Activity: Not on file   Stress: Not on file   Social Connections: Not on file   Intimate Partner Violence: Not on file   Housing Stability: Not on file        Family History   Problem Relation Age of Onset    Breast Cancer Mother 61    Hypertension Mother     Heart Disease Father     Prostate Cancer Father     Breast Cancer Maternal Grandmother 61       PHYSICAL EXAM:    BP (!) 127/44   Pulse 68   Temp 97 °F (36.1 °C) (Temporal)   Resp 14   Ht 5' 10\" (1.778 m)   Wt 150 lb 9.2 oz (68.3 kg)   SpO2 100%   BMI 21.61 kg/m²   CONSTITUTIONAL:  lying in bed  EYES: anicteric, lids and lashes normal  HENT:  normocepalic, mucous membranes dry  NECK:  supple, symmetrical, trachea midline  LUNGS: intubated  CARDIOVASCULAR:  Normal rate, regular rhythm  ABDOMEN:  soft, non-distended, non-tender  SKIN:  no rashes seen  EXTREMITIES:   Bilateral radial and L femoral 2+, R femoral arterial line in place    LABS:    Lab Results   Component Value Date    WBC 8.1 08/09/2022    HGB 4.3 (LL) 08/09/2022    HCT 13.1 (LL) 08/09/2022    PLT 77 (L) 08/09/2022    PROTIME 11.0 08/01/2022    INR 1.0 08/01/2022    K 3.9 08/09/2022    BUN 46 (H) 08/09/2022    CREATININE 4.4 (H) 08/09/2022         Assesment/Plan  67F for whom dialysis access was requested. Placed at L common femoral vein.     Ok to use immediately for dialysis access  Please page with any issues  If needs longer term access then let us know      Electronically signed by Marilin Morris MD on 8/9/2022 at 6:05 PM

## 2022-08-09 NOTE — PROCEDURES
Ernestina Strong is a 78 y.o. female patient. 1. Acute CVA (cerebrovascular accident) [facility])      Past Medical History:   Diagnosis Date    Acute CVA (cerebrovascular accident) ([address] 8/3/2022    Acute infection of bone ([address]     infection of rt foot, resolved. Acute osteomyelitis of phalanx of left hand ([address] 1/27/2022    Left third distal phalanx    Anemia of chronic disease     Arthritis     Breast cancer ([address]     right breast, 2008/ bladder, 2006- last chemotherapy \"years ago\"    CAD (coronary artery disease)     Carpal tunnel syndrome     bilat - for OR left hand 3-17-20     Chronic diastolic CHF (congestive heart failure) ([address] 09/23/2014 9/23/14- echocardiogram revealed moderate LV concentric hypertrophy, stage III diastolic dysfunction, mild tricuspid regurgitation    CKD (chronic kidney disease) stage 4, GFR 15-29 ml/min ([facility])     Diabetic retinopathy ([address]     Glaucoma     Hemodialysis patient ([address]     [facility]- Dr. Gaurav Maciel - left arm fistula     Hyperkalemia, diminished renal excretion 11/9/2017    Hyperlipidemia     Hypertension     Hypoglycemia unawareness in type 1 diabetes mellitus ([address] 11/7/2017    Insulin dependent type 2 diabetes mellitus ([address]     Neuropathy     feet    Osteomyelitis due to secondary diabetes ([address]     rt great toe with amputation    Patient is Caodaism 11/7/2017    Refusal of blood product     patient states she dose not take blood transfusion    Ventricular hypertrophy     Ventricular tachycardia ([address] 5/24/2021    Vitreous hemorrhage ([facility])     left eye     Blood pressure (!) 127/44, pulse 68, temperature 97 °F (36.1 °C), temperature source Temporal, resp. rate 14, height 5' 10\" (1.778 m), weight 150 lb 9.2 oz (68.3 kg), SpO2 100 %. Central Line    Date/Time: 8/9/2022 6:01 PM  Performed by: Ciro Germain MD  Authorized by: Melissa Fields MD   Consent: Written consent obtained.   Risks and benefits: risks, benefits and alternatives were discussed  Consent given by: power of   Required items: required blood products, implants, devices, and special equipment available  Patient identity confirmed: arm band  Time out: Immediately prior to procedure a \"time out\" was called to verify the correct patient, procedure, equipment, support staff and site/side marked as required. Indications: HD. Anesthesia: local infiltration    Anesthesia:  Local Anesthetic: lidocaine 1% without epinephrine  Anesthetic total: 5 mL    Sedation:  Patient sedated: no    Preparation: skin prepped with 2% chlorhexidine  Skin prep agent dried: skin prep agent completely dried prior to procedure  Sterile barriers: all five maximum sterile barriers used - cap, mask, sterile gown, sterile gloves, and large sterile sheet  Hand hygiene: hand hygiene performed prior to central venous catheter insertion  Location details: left femoral  Patient position: flat  Catheter type: double lumen  Catheter size: 14 Fr  Pre-procedure: landmarks identified  Ultrasound guidance: yes  Sterile ultrasound techniques: sterile gel and sterile probe covers were used  Number of attempts: 1  Successful placement: yes  Post-procedure: line sutured and dressing applied  Assessment: blood return through all ports and free fluid flow  Patient tolerance: patient tolerated the procedure well with no immediate complications  Comments: Dr. Genesis Lopez was available for this procedure.             Saadia Dunn MD  8/9/2022

## 2022-08-09 NOTE — PLAN OF CARE
Problem: Discharge Planning  Goal: Discharge to home or other facility with appropriate resources  Outcome: Not Progressing  Flowsheets (Taken 8/9/2022 1600)  Discharge to home or other facility with appropriate resources: Identify barriers to discharge with patient and caregiver     Problem: Metabolic/Fluid and Electrolytes - Adult  Goal: Hemodynamic stability and optimal renal function maintained  Outcome: Not Progressing  Flowsheets (Taken 8/9/2022 1600)  Hemodynamic stability and optimal renal function maintained: Monitor labs and assess for signs and symptoms of volume excess or deficit     Problem: Chronic Conditions and Co-morbidities  Goal: Patient's chronic conditions and co-morbidity symptoms are monitored and maintained or improved  8/9/2022 1609 by Deann Jama  Outcome: Progressing  Flowsheets (Taken 8/9/2022 1600)  Care Plan - Patient's Chronic Conditions and Co-Morbidity Symptoms are Monitored and Maintained or Improved: Monitor and assess patient's chronic conditions and comorbid symptoms for stability, deterioration, or improvement  8/9/2022 0838 by Domonique Woodward RN  Outcome: Progressing     Problem: Pain  Goal: Verbalizes/displays adequate comfort level or baseline comfort level  8/9/2022 1609 by [address] [place] [address] [employer] [facility]  Outcome: Progressing  8/9/2022 0838 by Domonique Woodward RN  Outcome: Progressing     Problem: Safety - Adult  Goal: Free from fall injury  8/9/2022 1609 by Deann Jama  Outcome: Progressing  Flowsheets (Taken 8/9/2022 1608)  Free From Fall Injury: Instruct family/caregiver on patient safety  8/9/2022 0838 by Domonique Woodward RN  Outcome: Progressing     Problem: ABCDS Injury Assessment  Goal: Absence of physical injury  8/9/2022 1609 by Deann Jama  Outcome: Progressing  Flowsheets (Taken 8/9/2022 1608)  Absence of Physical Injury: Implement safety measures based on patient assessment  8/9/2022 0838 by Domonique Woodward RN  Outcome: Progressing     Problem: Skin/Tissue Integrity  Goal: Absence of new skin breakdown  Description: 1. Monitor for areas of redness and/or skin breakdown  2. Assess vascular access sites hourly  3. Every 4-6 hours minimum:  Change oxygen saturation probe site  4. Every 4-6 hours:  If on nasal continuous positive airway pressure, respiratory therapy assess nares and determine need for appliance change or resting period.   8/9/2022 1609 by Angeles Butts  Outcome: Progressing  8/9/2022 0838 by Carol Quintana RN  Outcome: Progressing     Problem: Cardiovascular - Adult  Goal: Maintains optimal cardiac output and hemodynamic stability  Outcome: Progressing     Problem: Nutrition Deficit:  Goal: Optimize nutritional status  8/9/2022 1609 by Blessing Jama  Outcome: Progressing  8/9/2022 0838 by Carol Quintana RN  Outcome: Progressing     Problem: Respiratory - Adult  Goal: Achieves optimal ventilation and oxygenation  8/9/2022 1609 by Angeles Butts  Outcome: Progressing  8/9/2022 1113 by Ronn Swartz RCP  Outcome: Progressing  8/9/2022 0838 by Carol Quintana RN  Outcome: Progressing     Problem: Discharge Planning  Goal: Discharge to home or other facility with appropriate resources  Outcome: Not Progressing  Flowsheets (Taken 8/9/2022 1600)  Discharge to home or other facility with appropriate resources: Identify barriers to discharge with patient and caregiver     Problem: Metabolic/Fluid and Electrolytes - Adult  Goal: Hemodynamic stability and optimal renal function maintained  Outcome: Not Progressing  Flowsheets (Taken 8/9/2022 1600)  Hemodynamic stability and optimal renal function maintained: Monitor labs and assess for signs and symptoms of volume excess or deficit

## 2022-08-09 NOTE — PLAN OF CARE
Problem: Chronic Conditions and Co-morbidities  Goal: Patient's chronic conditions and co-morbidity symptoms are monitored and maintained or improved  Outcome: Progressing     Problem: Pain  Goal: Verbalizes/displays adequate comfort level or baseline comfort level  Outcome: Progressing     Problem: Safety - Adult  Goal: Free from fall injury  Outcome: Progressing     Problem: ABCDS Injury Assessment  Goal: Absence of physical injury  Outcome: Progressing     Problem: Skin/Tissue Integrity  Goal: Absence of new skin breakdown  Description: 1. Monitor for areas of redness and/or skin breakdown  2. Assess vascular access sites hourly  3. Every 4-6 hours minimum:  Change oxygen saturation probe site  4. Every 4-6 hours:  If on nasal continuous positive airway pressure, respiratory therapy assess nares and determine need for appliance change or resting period.   Outcome: Progressing     Problem: Nutrition Deficit:  Goal: Optimize nutritional status  Outcome: Progressing     Problem: Respiratory - Adult  Goal: Achieves optimal ventilation and oxygenation  Outcome: Progressing     Problem: Nutrition Deficit:  Goal: Optimize nutritional status  Outcome: Progressing

## 2022-08-09 NOTE — PROGRESS NOTES
08/09/22 0847   NICU Vent Information   Vent Type 980   Vent Mode AC/VC   Vt (Set, mL) 350 mL   Ve (lpm) 351 mL   Resp Rate (Set) 14 bmp   Rate Measured 14 br/min   Minute Volume 4.95 Liters   Peak Flow 55 L/min   Pressure Support 0 cmH20   FiO2  40 %   Peak Inspiratory Pressure (cmH2O) 21 cmH2O   I:E Ratio 1:5.10   Sensitivity 3   PEEP/CPAP (cmH2O) 5   Mean Airway Pressure (cmH2O) 7 cmH20   Plateau Pressure 15 [id number]   Static Compliance 36 mL/cmH2O   Additional Respiratory Assessments   Heart Rate 62   SpO2 97 %   Position Semi-Vidal's   Humidification Source Heated wire   Humidification Temp 37   Cuff Pressure (cm H2O) 29 cm H2O   Vent Alarm Settings   High Pressure  40 cmH2O

## 2022-08-09 NOTE — PROGRESS NOTES
Nephrology Progress Note  Patient's Name: Ken Brannon  11:19 AM  8/9/2022        Reason for Consult:  ESRD  Requesting Physician:  Primitivo Maldonado DO    Chief Complaint:  hypotension, altered mental status  History Obtained From:  EHR    History of Present Ilness:    Ken Brannon is a 78 y.o. female with a history of ESRD HD dependent, vertebral osteomyelitis ( s/p antibiotic treatment ) on suppressive antibiotic therapy with doxycycline, CAD, right breast cancer( s/p chemotx ; s/p mastectomy ) ) and several other medical problems listed below. Patient was observed to be slow to response following her hemodialysis treatment at the facility. She was seen in the ED for evaluation. No history of a fall. Initial evaluation in the ED revealed patient have a blood pressure 134/87, pulse of 56 and a temperature of 97.9. She was noted to have intermittent hypotensive episodes. Laboratory data showed WBC 2.5, hemoglobin 8.1, platelet count 66,536. CHEM profile was consistent with her ESRD status. Patient was admitted to telemetry for continuation of care. Patient developed marked hypotension last evening. RRT was called. Initial blood pressure noted to be 80/40. She was given 250 cc normal saline bolus. Subsequently transferred to MICU for further management. Subjective    8/4: worsening mental status last pm , requiring intubation  8/5: Remains intubated; worsening hypotension; pressors being increased  8/6: pt seen in icu, remains intubated and sedated  8/7: pt remains in icu, sedated and intubated  8/8: pt seen in icu, remains intubated. Awaits tx to [facility] or [facility],  nurse at bedside and with pulmonary.   8/9: pt seen in icu, remains intubated, awaits transfer , arden icu resident    Allergies:  Furosemide    Current Medications:    pantoprazole (PROTONIX) 40 mg in sodium chloride (PF) 10 mL injection, Q12H  ferric gluconate (FERRLECIT) 125 mg in sodium chloride 0.9 % 100 mL IVPB, Once  cyanocobalamin injection 1,000 mcg, Daily  midodrine (PROAMATINE) tablet 10 mg, TID WC  epoetin fani-epbx (RETACRIT) injection 3,000 Units, Once per day on Mon Wed Fri  hydrocortisone sodium succinate PF (SOLU-CORTEF) injection 50 mg, Q8H  insulin lispro (HUMALOG) injection vial 0-4 Units, TID WC  insulin lispro (HUMALOG) injection vial 0-4 Units, Nightly  folic acid injection 1 mg, Daily  ascorbic acid (VITAMIN C) tablet 500 mg, Daily  zinc sulfate (ZINCATE) capsule 50 mg, Daily  thiamine (B-1) injection 250 mg, Daily  meropenem (MERREM) 1,000 mg in sodium chloride 0.9 % 100 mL IVPB (mini-bag), Q24H  fluconazole (DIFLUCAN) 200 mg IVPB, Q24H  chlorhexidine (PERIDEX) 0.12 % solution 15 mL, BID  [Held by provider] aspirin chewable tablet 81 mg, Daily  perflutren lipid microspheres (DEFINITY) injection 1.65 mg, ONCE PRN  fentaNYL 10 mcg/ml in 0.9%  ml infusion, Continuous  glucose chewable tablet 16 g, PRN  dextrose bolus 10% 125 mL, PRN   Or  dextrose bolus 10% 250 mL, PRN  glucagon (rDNA) injection 1 mg, PRN  dextrose 10 % infusion, Continuous PRN  norepinephrine (LEVOPHED) 16 mg in dextrose 5% 250 mL infusion, Continuous  doxycycline hyclate (VIBRAMYCIN) capsule 100 mg, 2 times per day  gabapentin (NEURONTIN) capsule 100 mg, Nightly  white petrolatum ointment, TID PRN  sodium chloride flush 0.9 % injection 5-40 mL, 2 times per day  sodium chloride flush 0.9 % injection 5-40 mL, PRN  0.9 % sodium chloride infusion, PRN  ondansetron (ZOFRAN-ODT) disintegrating tablet 4 mg, Q8H PRN   Or  ondansetron (ZOFRAN) injection 4 mg, Q6H PRN  polyethylene glycol (GLYCOLAX) packet 17 g, Daily PRN  acetaminophen (TYLENOL) tablet 650 mg, Q6H PRN   Or  acetaminophen (TYLENOL) suppository 650 mg, Q6H PRN  allopurinol (ZYLOPRIM) tablet 100 mg, Daily  atorvastatin (LIPITOR) tablet 80 mg, Nightly  brimonidine (ALPHAGAN) 0.2 % ophthalmic solution 1 drop, BID  [Held by provider] bumetanide (BUMEX) tablet 2 mg, Once per day on Sun [place] Thu  lidocaine-prilocaine (EMLA) cream 1 Dose, See Admin Instructions  latanoprost (XALATAN) 0.005 % ophthalmic solution 1 drop, Nightly  [Held by provider] oxyCODONE-acetaminophen (PERCOCET) 5-325 MG per tablet 2 tablet, Q8H PRN  [Held by provider] sertraline (ZOLOFT) tablet 25 mg, Daily  [Held by provider] ticagrelor (BRILINTA) tablet 90 mg, BID  white petrolatum ointment, BID  [Held by provider] heparin (porcine) injection 5,000 Units, Q8H      Review of Systems:   Review of systems not obtained due to patient factors. Physical exam:  Vitals:    08/09/22 1100   BP:    Pulse: 60   Resp: 14   Temp:    SpO2: 98%          General: intubated, sedated  Eyes: PERRL. No sclera icterus. No conjunctival injection. ENT: No discharge. Pharynx clear. Neck: Trachea midline. Normal thyroid. Lungs: No accessory muscle use. few scattered rhonchi  Chest: R upper chest wall small wound, dressing applied  CV: Regular rate. Regular rhythm. No murmur or rub. .   Abd: Non-tender. Non-distended. No masses. No organmegaly. Normal bowel sounds. Skin: Warm and dry. No nodule on exposed extremities. No rash on exposed extremities.   Ext: bilateral UE edema; RUE sleeve, +lymphedema; LUE AVFgood thrill and bruit        Data:   Labs:  Lab Results   Component Value Date     (L) 08/09/2022     (L) 08/07/2022     (L) 08/06/2022    K 3.9 08/09/2022    K 4.5 08/07/2022    K 4.4 08/06/2022     08/09/2022    CO2 20 (L) 08/09/2022    CO2 19 (L) 08/07/2022    CO2 20 (L) 08/06/2022    CREATININE 4.4 (H) 08/09/2022    CREATININE 3.5 (H) 08/07/2022    CREATININE 3.3 (H) 08/06/2022    BUN 46 (H) 08/09/2022    BUN 32 (H) 08/07/2022    BUN 29 (H) 08/06/2022    GLUCOSE 243 (H) 08/09/2022    GLUCOSE 237 (H) 08/07/2022    GLUCOSE 297 (H) 08/06/2022    PHOS 3.1 08/09/2022    PHOS 3.6 08/07/2022    PHOS 3.3 08/06/2022    WBC 8.1 08/09/2022    WBC 9.1 08/08/2022    WBC 9.8 08/07/2022    HGB 4.3 (LL) 08/09/2022    HGB 4.8 (LL) 8/1/2022 12:56 pm COMPARISON: Previous chest x-ray of 07/11/2022 and CT scan of 06/15/2022 HISTORY: ORDERING SYSTEM PROVIDED HISTORY: altered TECHNOLOGIST PROVIDED HISTORY: Reason for exam:->altered What reading provider will be dictating this exam?->CRC FINDINGS: The cardiac silhouette is within normal limits. The right lung is clear. There is chronic elevation right hemidiaphragm There is a very small patchy opacity seen within the left lung base which could represent atelectasis or less likely pneumonia. The left upper lobe is clear. 1. Small patchy opacity within the left lung base which could represent atelectasis or pneumonia. Atelectasis is favored. 2. The right lung is clear. Lester Holstein Charlene Branch osteoblast most of the muscle last part of    Assessment    Shock, septic  Proteus and serratia from chest wound   Did not respond to fluid bolus    Altered mental status  due to R pontine infarct    Acute resp failure  Sp intubation    R upper chest wall wound/sacral decub  Mediport removed from chest  Recent vertebral OM  Abx per id    ESRD  HD dependent MWF as outpt  Last hd here thurs  No hd because hgb is only 5.5>5.8>4.8>4.3  Pt does not accept blood products  May need emergency consent since her life is at risk but family will not agree  Will start cvvh    6. Hyponatremia  Na 129  Awaits hd    7.  Anemia  Started demarcus  Her life is at risk with hgb of 4.3 especially with cad  [facility]            Barbara Chong MD  11:19 AM  8/9/2022

## 2022-08-09 NOTE — CARE COORDINATION
8/9:  Update CM Note:  Pt [facility] ER [facility] [address] [facility]. Pt was a RRT on 8/2 for AMS. Pt is a HD M-W-F  at [address]. Pt's sister Silvino Leslie confirmed the plan is for pt to return to [address] Pt is intubated, Iv Protonix, Iv Ferrlecit, Iv Solu-cortef, Iv Folic Acid. Iv Diflucan, Iv Merrem, Iv Thiamine & Iv Levophed. Pt is a Jehovah Witness & plan to transfer to [facility] with bloodless medicine. Pt has been accepted at [address] [place] [address] or [facility]  pending a bed. Per access center no icu bed at [facility] only at [place].  will talk with family & advise whether to proceed. [address] called advised to send face sheet for them to review [phone]. Per access center Werner Denny advise to check [address] 1st before [place] can accept. If [facility] accept will need prior authorization [phone]. Will need Covid test.  Ambulance form place in soft chart. Sw/JOELLE will continue to follow for dc planning.  Electronically signed by Bill Ramirez RN on 8/9/2022 at 9:15 AM

## 2022-08-09 NOTE — PROGRESS NOTES
[facility]   Progress note      Subjective:      Remains intubated, but awake today on fentanyl  -Off pressor support and still maintaining adequate mean arterial pressure in spite of H&H of 4.3/13  No acute complaints  Eyes open but appears extremely lethargic      Objective:    BP (!) 130/51   Pulse 65   Temp 97 °F (36.1 °C) (Temporal)   Resp 14   Ht 5' 10\" (1.778 m)   Wt 150 lb 9.2 oz (68.3 kg)   SpO2 99%   BMI 21.61 kg/m²     In: 2455.8 [I.V.:394.2; NG/GT:1582]  Out: 250   In: 2455.8   Out: 250   Intubated eyes open-intubated  HEENT: AT/NC, MMM  Neck: FROM, supple    Lungs: Clear to auscultation  CV: RRR, no MRGs  Vasc: Radial pulses 2+ right-sided in place, purulent drainage  Abdomen: Soft, non-tender; no masses or HSM  Extremities: No peripheral edema or digital cyanosis  Skin: no rash, lesions or ulcers       Recent Labs     08/07/22  0411 08/08/22  1308 08/09/22  0407   WBC 9.8 9.1 8.1   HGB 5.8* 4.8* 4.3*   HCT 17.6* 14.1* 13.1*   PLT 92* 82* 77*       Recent Labs     08/07/22  0411 08/09/22  0407   * 129*   K 4.5 3.9   CL 96* 100   CO2 19* 20*   BUN 32* 46*   CREATININE 3.5* 4.4*   CALCIUM 9.5 8.9       Assessment:    Principal Problem:    Stroke, lacunar ([facility])  Active Problems:    Acute CVA (cerebrovascular accident) ([facility]    CKD (chronic kidney disease) stage 3, GFR 30-59 ml/min ([facility])  Resolved Problems:    * No resolved hospital problems. *      Plan:    52-year-old female with complicated past medical history including but not limited to-ESRD, osteomyelitis, coronary artery disease, previous strokes, CHF presents to the ED with strokelike symptoms and is admitted to telemetry unit with      Sepsis/shock/respiratory failure?   Intubated 8/2/2022 secondary to worsening confusion and respiratory failure  Vent management per ICU team  Transferred to ICU setting 8-2-22 after rapid response team was called for hypotension  Pancultures pending-negative to date  On suppressive Doxy therapy for osteomyelitis of spine-infectious disease following, input appreciated  Patient also has a large decubitus ulcer-Proteus mirabilis and Serratia  Right-sided port has been removed, no purulence at the site noted today  Merrem/Diflucan pending pan culture results empirically  Fecal management system in place now per ID request secondary to patient having feces interfering with lines  and with an open large decubitus ulcer  Nearly off Levophed     Acute/subacute lacunar infarct right jerardo  -MRI brain without contrast pending  -Aspirin 81 mg Lipitor 40 mg daily  -Consult neurology -input appreciated  -Check lipid panel and hemoglobin A1c needs tight control of risk factors.   -Monitor blood pressure-adjust medications as needed.  -Echocardiogram with ejection fraction 65%, no PFO     ESRD/anemia-hemoglobin has dropped further to 4.3/13.1 today  Transfuse PRBCs to keep greater than 7-patient does not accept transfusion secondary to Sabianism inclination-Yarsanism  General surgery evaluation secondary to acute anemia   Patient appears profoundly lethargic-no plans for transfusion given above social issues  -Dialysis MWF  -Possibility of initiation of CVVHD however no access to initiate such  -Monitor labs       Mild elevation in ALT AST  Continue to monitor daily  No evidence of Daly sign right upper quadrant     DVT Prophylaxis   PT/OT  Discharge planning no    Case discussed with Dr. Inés Castro disease 8/8/ 22 at bedside  Family not  available at bedside   Case discussed with resident/nursing 8/9/2022  Await transfer to [facility] for nontransfusion related management/treatment in [address] patient      Michael Barrera MD  4:45 PM  8/9/2022

## 2022-08-09 NOTE — PROGRESS NOTES
2+ bilateral edema , Left arm wrapped to cover brachiocephalic fistula, right arterial femoral line   Musculoskeletal: Unable to assess ROM due mental status   Neurologic: Mental status: GCS 10, patient follows commands such as blinking eyes. Attempts to communicate by moving head. Skin: Right sided chest wound. Disc shaped wound about 2cm in diameter and depth. No erythema, swelling or discharge.      Lines     site day    Art line   R Femoral 7   TLC L Fem    Left subclavian   (Guidewire exchange done) Removed    5   PICC None    Hemoaccess Left arm       VENT SETTINGS (Comprehensive) (if applicable):  Vent Information  Ventilator ID: [id number]  Additional Respiratory Assessments  Heart Rate: 65  Resp: 14  SpO2: 99 %  Position: Semi-Vidal's  Humidification Source: Heated wire  Humidification Temp: 37  Circuit Condensation: Drained  Airway Type: ET  Airway Size: 7.5  Cuff Pressure (cm H2O): 29 cm H2O    ABGs:   Recent Labs     08/08/22  0551 08/09/22  0437   PH 7.456* 7.481*   PCO2 26.4* 27.7*   PO2 121.8* 153.8*   HCO3 18.2* 20.2*   BE -5.3*  --    O2SAT 98.6*  --          Laboratory findings:  Complete Blood Count:   Recent Labs     08/07/22  0411 08/08/22  1308 08/09/22  0407   WBC 9.8 9.1 8.1   HGB 5.8* 4.8* 4.3*   HCT 17.6* 14.1* 13.1*   PLT 92* 82* 77*          Last 3 Blood Glucose:   Recent Labs     08/07/22  0411 08/09/22  0407   GLUCOSE 237* 243*          PT/INR:    Lab Results   Component Value Date/Time    PROTIME 11.0 08/01/2022 12:43 PM    PROTIME 10.4 02/15/2012 10:40 AM    INR 1.0 08/01/2022 12:43 PM     PTT:    Lab Results   Component Value Date/Time    APTT 45.6 08/01/2022 12:43 PM       Comprehensive Metabolic Profile:   Recent Labs     08/07/22  0411 08/09/22  0407   * 129*   K 4.5 3.9   CL 96* 100   CO2 19* 20*   BUN 32* 46*   CREATININE 3.5* 4.4*   GLUCOSE 237* 243*   CALCIUM 9.5 8.9        Magnesium:   Lab Results   Component Value Date/Time    MG 1.9 08/09/2022 04:07 AM     Phosphorus: Lab Results   Component Value Date/Time    PHOS 3.1 08/09/2022 04:07 AM     Ionized Calcium:   Lab Results   Component Value Date/Time    CAION 1.22 11/08/2017 04:28 AM      Troponin: No results for input(s): TROPONINI in the last 72 hours. Medications     Infusions: (Fluid, Sedation, Vasopressors)    Levophed 3 mcg/min  Fentanyl held    Nutrition: NG tube feeding renal formula       ATB:   Antibiotics  Days   Meropenem     Vibramycin    Fluconazole      Cultures:     Chest wound culture positive for proteus mirabilis and Serratia       Imaging     CT ABDOMEN PELVIS WO CONTRAST Additional Contrast? None    Result Date: 8/3/2022  Extensive anasarca seen throughout the soft tissues the chest abdomen and pelvis. Irregularity identified at the site of the prior ruth catheter along the right anterior chest wall with small radiopaque density seen in the superior soft tissues suggesting possibly calcification or radiopaque foreign body. Small bilateral pleural effusions with atelectatic changes seen at the lung bases bilaterally or infiltrate. Chronic osteomyelitis involving L1 and L2 with hardware seen within the lower lumbar spine. There is no evidence of acute intra-or pelvic abnormality. Extensive vascular calcifications seen throughout the thoracic and abdominal aorta and mesenteric and iliac vessels. CT HEAD WO CONTRAST    Result Date: 8/4/2022  Stable small hypodensity in the right jerardo, which could represent infarct. However, this should be confirmed with MRI. No new acute intracranial process identified. CT Head WO Contrast    Result Date: 8/1/2022  Small hypodensity right jerardo not demonstrated previously. Acute or subacute lacunar infarct at this location cannot be excluded. This finding could be more fully evaluated with MRI. CT Head WO Contrast    Result Date: 7/31/2022  No acute intracranial abnormality.   Small scalp hematoma overlying the left orbit     CT CHEST WO CONTRAST    Result Date: 8/3/2022  Extensive anasarca seen throughout the soft tissues the chest abdomen and pelvis. Irregularity identified at the site of the prior ruth catheter along the right anterior chest wall with small radiopaque density seen in the superior soft tissues suggesting possibly calcification or radiopaque foreign body. Small bilateral pleural effusions with atelectatic changes seen at the lung bases bilaterally or infiltrate. Chronic osteomyelitis involving L1 and L2 with hardware seen within the lower lumbar spine. There is no evidence of acute intra-or pelvic abnormality. Extensive vascular calcifications seen throughout the thoracic and abdominal aorta and mesenteric and iliac vessels. CT Cervical Spine WO Contrast    Result Date: 7/31/2022  Multilevel degenerative changes seen within the cervical spine with no acute abnormality of the cervical spine. XR CHEST PORTABLE    Result Date: 8/4/2022  No acute process     XR CHEST PORTABLE    Result Date: 8/3/2022  Endotracheal tube and nasogastric tubes in satisfactory position. XR CHEST PORTABLE    Result Date: 8/3/2022  Presumed enteric tube with tip in the distal esophagus. Repositioning is recommended. Enteric tube positioned as described. Bilateral lower lobe infiltrates and small bilateral pleural effusions. XR CHEST PORTABLE    Result Date: 8/1/2022  1. Small patchy opacity within the left lung base which could represent atelectasis or pneumonia. Atelectasis is favored. 2. The right lung is clear. XR ABDOMEN FOR NG/OG/NE TUBE PLACEMENT    Result Date: 8/3/2022  Catheter is in the right upper abdomen, probably stomach in conjunction with patient positioning. XR CHEST ABDOMEN NG PLACEMENT    Result Date: 8/2/2022  Satisfactory placement of NG tube as described.         Resident's Assessment and Plan     Claudia Clark   , 78 y.o. , female came with CC : AMS     has a past medical history of Acute CVA (cerebrovascular accident) ([facility] [address] Acute infection of bone ([address] Acute osteomyelitis of phalanx of left hand ([address] Anemia of chronic disease, Arthritis, Breast cancer ([address] CAD (coronary artery disease), Carpal tunnel syndrome, Chronic diastolic CHF (congestive heart failure) ([address] CKD (chronic kidney disease) stage 4, GFR 15-29 ml/min ([address] Diabetic retinopathy ([address] Glaucoma, Hemodialysis patient ([address] Hyperkalemia, diminished renal excretion, Hyperlipidemia, Hypertension, Hypoglycemia unawareness in type 1 diabetes mellitus ([facility] [address] Insulin dependent type 2 diabetes mellitus ([address] Neuropathy, Osteomyelitis due to secondary diabetes [facility]), Patient is Sabianism, Refusal of blood product, Ventricular hypertrophy, Ventricular tachycardia ([address] and Vitreous hemorrhage ([address]          Days since Admission: 9  Days on Ventilator : 7    Consults:   Nephrology, last date of service 8/9  ID, last date of service 8/9  Cardiology, last date of service 8/9   Vascular Surgery, last date of service 8/9  General Surgery, last date of service 8/5  Neurology, last date of service 8/5     Assessment:      Neurology      AMS likely 2/2 Acute/subacute lacunar stroke vs septic shock   CT scan head wo contrast revealed small hypodense region right jerardo  Chest Wound cultures positive for proteus and serratia    MRI was considered for reevaluation of stroke, however due to the presence of a spinal stimulator, repeat CT scan was ordered  Repeat CT scan reveals small hypodensity located in right jerardo, similar to previous CT, not acute intracranial abnormality   EEG reveals diffuse slowing consistent with moderate to severe metabolic encephalopathy  Continue levophed  Hold Fentanyl 50 mcg/hr   Follow neurology recommendations  Brilinta and Asprin held due to low Hb, per cardiology  Continue Antibiotic management as below       Cardiology    Shock likely septic due to infected wound in place of removed Mediport   Acute Hypotension during RRT  Prior to second RRT, patient missed her midodrine dose  Mediport removed 2/2 hardware infection in June 2022  Wound culture positive for Proteus mirabilis and Serratia marcescens  Echo done in August 5 2022,  Ejection fraction 65%, reveals LV concentric hypertrophy   Restart Midodrine 10 mg TID PO  Bumex held due to low BP  Continue Levophed infusion  Consider adding Vasopressin 0.03 units/min if more vasopressor is needed  MAP goal over 65  Wean down Solu-Cortef 50 mg q12hr  Inpatient cardiology consultation  Continue to monitor cardiac rhythm       Coronary artery disease status post stent in May 2022  Hold ticagrelor and aspirin  Continue Lipitor 80 mg nightly       History of hyperlipidemia  Continue Lipitor 80 mg nightly         History of heart failure with reduced ejection fraction, ejection fraction 60%, last echo was done in August 6/2022  Bumex held due to hypotension    Hx of hypotension  On midodrine       Pulmonology    Acute respiratory failure on admission 2/2 AMS, currently intubated   Patient on intubation day 7  ABG 7.48/27.7/153/20  Vent settings 350/14/5/40  PEEP dropped from 8 to 5   Monitor respiratory function  Monitor ABG      Renal    End-stage renal disease on hemodialysis 3 times a week  Has Left brachiocephalic fistula   Patient is unable to receive sustained low efficiency dialysis due to Low Hb  Possible transfer to [facility]  Uremic platelet dysfunction can further bleeding  Triple lumen catheter to be inserted in L femoral artery by Vascular Surgery  Plan for CVVHD-F today, per nephrology       History of hyperuricemia  Continue allopurinol      Endocrine    History of type 2 diabetes mellitus  Home gil is 5 units Lantus nightly   Hold home dose  Monitor blood sugar levels  On LDSS        Infectious disease    Concern for sepsis/septic shock likely secondary to chest wound infection due to Proteus mirabilis and Serratia   Wound culture positive for Proteus mirabilis and Serratia marcescens  Pancytopenia during RRT code   Low body temperature during RRT  D/C  Zosyn  D/C ceftriaxone  D/C vancomycin  Continue doxycycline 100 mg q12h  Continue  Fluconazole 400mg IV once, followed by 200mg qday  Continue  Meropenem 1000 mg qday  Tip cultures for Femoral central positive for staph coagulase, likely contaminant   Repeat blood culture, no growth   MRSA Nares culture no growth   Follow infectious disease recommendations    Chest wound infection due to Mediport removal  Mediport removed 2/2 hardware infection in June 2022  Vitamin C and Zinc, and thiamine for wound care   Wound care consult      Hematology    Acute on top of Chronic Normocytic anemia likely 2/2  pectoralis hematoma vs unknown bleeding source vs  Current Hb 4.3  Follow CBC every other day   FOBT test  CT scan wo contrast of chest reveal stable pectoralis hematoma   Minimize blood draws to every other day   Hold heparin ppx duet to bleeding risk   Continue Iron, EPO, vitamin C, thiamine, zinc, vitamin C, and B12 supplementation  Possible transfer to [facility]   Iron sat 153%  Reticulocyte count 6.6%  EPO dose modified to 40,000 Sc daily   Tagged Nuclear RBC scan done today to evaluate for GI bleeding   Protonix increased to 40mg BID     Thrombocytopenia  likely 2/2 unknown bleeding source vs pectoralis hematoma vs uremia  Platelet count 77  BUN levels 46  Consider adding  Ddavp for worsening bleeding if needed 2/2 uremic platelet dysfunction        Pancytopenia likely secondary to infection  Continue to monitor CBC, WBC count 9.1  Hb 4.8       Cannot receive blood products due to Baptism believes    GI    CT scan of abdomen reveals likely enterocolitis vs ileus        Oncology     Hx of BRCA+ breast malignancy, s/p mastectomy   Right upper extremity lymphedema and diffuse anasarca       DVT ppx: Heparin held , sequential compression device   GI ppx: Protonix 40 mg BID        Code Status:   Full     Disposition: Continue current care, pending possible transfer to either [facility] or Alberta Hopkins MD, PGY-1    Attending physician: Dr. Ivon Nieto  Department of Pulmonary, Critical Care and Sleep Medicine  [address] [facility]  Department of Internal Medicine      During multidisciplinary team rounds Adonay Rocha is a 78 y.o. female was seen, examined and discussed. This is confirmation that I have personally seen and examined the patient and that the key elements of the encounter were performed by me (> 85 % time). The medications & laboratory data was discussed and adjusted where necessary. The radiographic images were reviewed or with radiologist or consultant if felt dis-concordant with the exam or history. The above findings were corroborated, plans confirmed and changes made if needed. Family is updated at the bedside as available. Key issues of the case were discussed among consultants. Critical Care time is documented if appropriate.       Diamante Pang DO, FACP, FCCP, [facility],

## 2022-08-09 NOTE — PROGRESS NOTES
extremity    Coronary artery disease involving native coronary artery of native heart with angina pectoris ([facility] [address]    Mitral valve disease    CAD in native artery    Lumbar stenosis without neurogenic claudication    Intractable low back pain    Unable to ambulate    NSTEMI (non-ST elevated myocardial infarction) ([facility])    Moderate protein-calorie malnutrition (HCC)    Ischemic cardiomyopathy    Lower abdominal pain    Hypotension    Vertebral osteomyelitis, chronic ([facility])    Symptomatic sinus bradycardia    Status post amputation of right great toe ([facility])    Stroke, lacunar ([facility] [address]    Acute CVA (cerebrovascular accident) ([facility] [address]       Allergies: Allergies   Allergen Reactions    Furosemide Swelling and Other (See Comments)     Patient states \"I swelled up like a pig. \" states her kidneys shut down. Patient tolerates Bumex.          Current Medications:  Current Facility-Administered Medications   Medication Dose Route Frequency Provider Last Rate Last Admin    ferric gluconate (FERRLECIT) 125 mg in sodium chloride 0.9 % 100 mL IVPB  125 mg IntraVENous Once Sully Caputo DO        cyanocobalamin injection 1,000 mcg  1,000 mcg IntraMUSCular Daily Sully Caputo DO   1,000 mcg at 08/08/22 1719    midodrine (PROAMATINE) tablet 10 mg  10 mg Oral TID  Mickey Lam MD   10 mg at 08/08/22 1850    epoetin fani-epbx (RETACRIT) injection 3,000 Units  3,000 Units SubCUTAneous Once per day on Mon Wed Fri Manju Abraham MD   3,000 Units at 08/08/22 7322    hydrocortisone sodium succinate PF (SOLU-CORTEF) injection 50 mg  50 mg IntraVENous Q8H Mickey Lam MD   50 mg at 08/09/22 0218    insulin lispro (HUMALOG) injection vial 0-4 Units  0-4 Units SubCUTAneous TID  Jaci Beltran MD   1 Units at 08/08/22 1835    insulin lispro (HUMALOG) injection vial 0-4 Units  0-4 Units SubCUTAneous Nightly Jaci Beltran MD        folic acid injection 1 mg  1 mg IntraVENous Daily Jaci Beltran MD   1 mg at 08/08/22 0855    ascorbic acid (VITAMIN C) tablet 500 mg  500 mg Oral Daily Aubrey Bern, DO   500 mg at 08/08/22 5295    zinc sulfate (ZINCATE) capsule 50 mg  50 mg Oral Daily Gómez Bern, DO   50 mg at 08/08/22 0900    thiamine (B-1) injection 250 mg  250 mg IntraVENous Daily Gómez Bern, DO   250 mg at 08/08/22 0859    meropenem (MERREM) 1,000 mg in sodium chloride 0.9 % 100 mL IVPB (mini-bag)  1,000 mg IntraVENous Q24H Mickey Das MD   Stopped at 08/08/22 1705    fluconazole (DIFLUCAN) 200 mg IVPB  200 mg IntraVENous Q24H Mickey Call MD   Stopped at 08/08/22 1547    pantoprazole (PROTONIX) 40 mg in sodium chloride (PF) 10 mL injection  40 mg IntraVENous Daily Gómez Bern, DO   40 mg at 08/08/22 0856    chlorhexidine (PERIDEX) 0.12 % solution 15 mL  15 mL Mouth/Throat BID Gómez Bern, DO   15 mL at 08/08/22 2127    [Held by provider] aspirin chewable tablet 81 mg  81 mg Oral Daily Mickey Call MD   81 mg at 08/05/22 7782    perflutren lipid microspheres (DEFINITY) injection 1.65 mg  1.5 mL IntraVENous ONCE PRN Mickey Call MD        fentaNYL 10 mcg/ml in 0.9%  ml infusion   mcg/hr IntraVENous Continuous Nafisa Alfaro MD   Stopped at 08/07/22 1045    glucose chewable tablet 16 g  4 tablet Oral PRN Kinsey Kumar MD        dextrose bolus 10% 125 mL  125 mL IntraVENous PRN Kinsey Kumar MD   Stopped at 08/03/22 1003    Or    dextrose bolus 10% 250 mL  250 mL IntraVENous PRN Kinsey Kumar MD        glucagon (rDNA) injection 1 mg  1 mg SubCUTAneous PRN Kinsey Kumar MD        dextrose 10 % infusion   IntraVENous Continuous PRN Kinsey Kumar MD        norepinephrine (LEVOPHED) 16 mg in dextrose 5% 250 mL infusion  1-100 mcg/min IntraVENous Continuous Soo Freedman DO 2.8 mL/hr at 08/08/22 2322 3 mcg/min at 08/08/22 2322    doxycycline hyclate (VIBRAMYCIN) capsule 100 mg  100 mg Oral 2 times per day Dinah Pryor MD   100 mg at 08/08/22 2127    gabapentin (NEURONTIN) GOYO Blair CNP   25 mg at 08/03/22 0938    [Held by provider] ticagrelor (BRILINTA) tablet 90 mg  90 mg Oral BID GOYO Blair CNP   90 mg at 08/05/22 2046    white petrolatum ointment   Topical BID Pilar Bocanegra MD   Given at 08/08/22 2127    [Held by provider] heparin (porcine) injection 5,000 Units  5,000 Units SubCUTAneous Q8H GOYO Blair CNP   5,000 Units at 08/05/22 2034      fentaNYL Stopped (08/07/22 1045)    dextrose      norepinephrine 3 mcg/min (08/08/22 2322)    sodium chloride 10 mL/hr at 08/09/22 0300       Physical Exam:  BP (!) 130/51   Pulse 63   Temp (!) 96.4 °F (35.8 °C) (Oral)   Resp 14   Ht 5' 10\" (1.778 m)   Wt 150 lb 9.2 oz (68.3 kg)   SpO2 98%   BMI 21.61 kg/m²   Weight change: -3 lb 8.4 oz (-1.6 kg)  Wt Readings from Last 3 Encounters:   08/09/22 150 lb 9.2 oz (68.3 kg)   07/31/22 200 lb (90.7 kg)   07/21/22 226 lb 6.4 oz (102.7 kg)     The patient is intubated and in no discomfort or distress. No gross musculoskeletal deformity is present. No significant skin or nail changes are present. Gross examination of head, eyes, nose and throat are negative. Jugular venous pressure is normal and no carotid bruits are present. Normal respiratory effort is noted with no accessory muscle usage present. Lung fields demonstrate coarse breath sounds to ascultation. Cardiac examination is notable for a regular rate and rhythm with no palpable thrill. No gallop rhythm or cardiac murmur are identified. A benign abdominal examination is present with no masses or organomegaly. Intact pulses are present throughout all extremities and mild lymphedema is present. No focal neurologic deficits are present.     Intake/Output:    Intake/Output Summary (Last 24 hours) at 8/9/2022 0641  Last data filed at 8/9/2022 0600  Gross per 24 hour   Intake 1801.61 ml   Output 200 ml   Net 1601.61 ml     I/O this shift:  In: 2555 [I.V.:154.3; NG/GT:807; IV Piggyback:400.7]  Out: 200 [Stool:200]    Laboratory Tests:  Lab Results   Component Value Date    CREATININE 4.4 (H) 08/09/2022    BUN 46 (H) 08/09/2022     (L) 08/09/2022    K 3.9 08/09/2022     08/09/2022    CO2 20 (L) 08/09/2022     No results for input(s): CKTOTAL, CKMB in the last 72 hours. Invalid input(s): TROPONONI  Lab Results   Component Value Date    BNP 1,395 (H) 02/18/2013     Lab Results   Component Value Date/Time    WBC 8.1 08/09/2022 04:07 AM    RBC 1.55 08/09/2022 04:07 AM    HGB 4.3 08/09/2022 04:07 AM    HCT 13.1 08/09/2022 04:07 AM    MCV 84.5 08/09/2022 04:07 AM    MCH 27.7 08/09/2022 04:07 AM    MCHC 32.8 08/09/2022 04:07 AM    RDW 17.8 08/09/2022 04:07 AM    PLT 77 08/09/2022 04:07 AM    MPV NOT CALC 08/09/2022 04:07 AM     No results for input(s): ALKPHOS, ALT, AST, PROT, BILITOT, BILIDIR, LABALBU in the last 72 hours.   Lab Results   Component Value Date/Time    MG 1.9 08/09/2022 04:07 AM     Lab Results   Component Value Date/Time    PROTIME 11.0 08/01/2022 12:43 PM    PROTIME 10.4 02/15/2012 10:40 AM    INR 1.0 08/01/2022 12:43 PM     Lab Results   Component Value Date/Time    TSH 4.170 07/11/2022 10:48 AM     No components found for: CHLPL  Lab Results   Component Value Date    TRIG 66 08/02/2022    TRIG 66 05/04/2022    TRIG 107 04/15/2022     Lab Results   Component Value Date    HDL 45 08/04/2022    HDL 55 08/02/2022    HDL 42 05/04/2022     Lab Results   Component Value Date    LDLCALC 25 08/04/2022    [facility] 24 08/02/2022    [facility] 48 05/04/2022       Cardiac Tests:  Telemetry findings reviewed: sinus rhythm with occasional ventricular ectopy, no new tachy/bradyarrhythmias overnight      ASSESSMENT / PLAN: On a clinical basis, the patient remains critically ill with a progressive anemia increasing myocardial oxygen demands in addition to that of ongoing thrombocytopenia with ongoing needs of withholding of antiplatelet therapy and spite of associated adverse risks in the face of her recent coronary intervention. Ongoing nutritional support will remain essential to fluid mobilization reducing risk of progressive debilitation. Ongoing aggressive risk factor modification of blood pressure, diabetes and serum lipids will remain essential to reducing risk of future atherosclerotic development. Her overall prognosis remains guarded with the predominance of further management during present hospitalization deferred to that of primary care as well as the nephrology and infectious disease services. We will further evaluate her during hospitalization should additional cardiovascular difficulties or concerns arise and with additional outpatient cardiovascular evaluation is appropriate deferred to her primary cardiologist, Iraj Wade. Note: This report was completed utilizing computer voice recognition software. Every effort has been made to ensure accuracy, however; inadvertent computerized transcription errors may be present. Dahiana Cheng.  Denise Arechiga, [id number] [facility]

## 2022-08-10 ENCOUNTER — APPOINTMENT (OUTPATIENT)
Dept: GENERAL RADIOLOGY | Age: 67
DRG: 064 | End: 2022-08-10
Payer: COMMERCIAL

## 2022-08-10 VITALS
DIASTOLIC BLOOD PRESSURE: 46 MMHG | BODY MASS INDEX: 22.28 KG/M2 | RESPIRATION RATE: 14 BRPM | OXYGEN SATURATION: 97 % | WEIGHT: 155.6 LBS | SYSTOLIC BLOOD PRESSURE: 116 MMHG | HEART RATE: 130 BPM | HEIGHT: 70 IN | TEMPERATURE: 96.4 F

## 2022-08-10 LAB
AADO2: 331.9 MMHG
AADO2: 431.8 MMHG
ALBUMIN SERPL-MCNC: 2.6 G/DL (ref 3.5–5.2)
ALBUMIN SERPL-MCNC: 2.6 G/DL (ref 3.5–5.2)
ALP BLD-CCNC: 160 U/L (ref 35–104)
ALP BLD-CCNC: 191 U/L (ref 35–104)
ALT SERPL-CCNC: 51 U/L (ref 0–32)
ALT SERPL-CCNC: 51 U/L (ref 0–32)
ANION GAP SERPL CALCULATED.3IONS-SCNC: 12 MMOL/L (ref 7–16)
ANION GAP SERPL CALCULATED.3IONS-SCNC: 14 MMOL/L (ref 7–16)
ANION GAP SERPL CALCULATED.3IONS-SCNC: 18 MMOL/L (ref 7–16)
AST SERPL-CCNC: 43 U/L (ref 0–31)
AST SERPL-CCNC: 46 U/L (ref 0–31)
B.E.: -1.4 MMOL/L (ref -3–3)
B.E.: 0 MMOL/L (ref -3–3)
BILIRUB SERPL-MCNC: 0.4 MG/DL (ref 0–1.2)
BILIRUB SERPL-MCNC: 0.5 MG/DL (ref 0–1.2)
BUN BLDV-MCNC: 22 MG/DL (ref 6–23)
BUN BLDV-MCNC: 30 MG/DL (ref 6–23)
BUN BLDV-MCNC: 44 MG/DL (ref 6–23)
CALCIUM IONIZED: 0.61 MMOL/L (ref 1.15–1.33)
CALCIUM IONIZED: 0.78 MMOL/L (ref 1.15–1.33)
CALCIUM IONIZED: 1 MMOL/L (ref 1.15–1.33)
CALCIUM IONIZED: 1.03 MMOL/L (ref 1.15–1.33)
CALCIUM IONIZED: 1.2 MMOL/L (ref 1.15–1.33)
CALCIUM SERPL-MCNC: 8.9 MG/DL (ref 8.6–10.2)
CALCIUM SERPL-MCNC: 9 MG/DL (ref 8.6–10.2)
CALCIUM SERPL-MCNC: 9.2 MG/DL (ref 8.6–10.2)
CHLORIDE BLD-SCNC: 100 MMOL/L (ref 98–107)
CHLORIDE BLD-SCNC: 100 MMOL/L (ref 98–107)
CHLORIDE BLD-SCNC: 99 MMOL/L (ref 98–107)
CO2: 20 MMOL/L (ref 22–29)
CO2: 21 MMOL/L (ref 22–29)
CO2: 22 MMOL/L (ref 22–29)
COHB: 0.7 % (ref 0–1.5)
COHB: 1.2 % (ref 0–1.5)
CORTISOL TOTAL: 34.32 MCG/DL (ref 2.68–18.4)
CREAT SERPL-MCNC: 2 MG/DL (ref 0.5–1)
CREAT SERPL-MCNC: 2.6 MG/DL (ref 0.5–1)
CREAT SERPL-MCNC: 4.7 MG/DL (ref 0.5–1)
CRITICAL: ABNORMAL
CRITICAL: ABNORMAL
DATE ANALYZED: ABNORMAL
DATE ANALYZED: ABNORMAL
DATE OF COLLECTION: ABNORMAL
DATE OF COLLECTION: ABNORMAL
FIO2: 100 %
FIO2: 100 %
GFR AFRICAN AMERICAN: 11
GFR AFRICAN AMERICAN: 22
GFR AFRICAN AMERICAN: 30
GFR NON-AFRICAN AMERICAN: 11 ML/MIN/1.73
GFR NON-AFRICAN AMERICAN: 22 ML/MIN/1.73
GFR NON-AFRICAN AMERICAN: 30 ML/MIN/1.73
GLUCOSE BLD-MCNC: 168 MG/DL (ref 74–99)
GLUCOSE BLD-MCNC: 188 MG/DL (ref 74–99)
GLUCOSE BLD-MCNC: 210 MG/DL (ref 74–99)
HCO3: 21.9 MMOL/L (ref 22–26)
HCO3: 22.8 MMOL/L (ref 22–26)
HCT VFR BLD CALC: 17.7 % (ref 34–48)
HEMOGLOBIN: 5.9 G/DL (ref 11.5–15.5)
HHB: 0.5 % (ref 0–5)
HHB: 0.5 % (ref 0–5)
LAB: ABNORMAL
MAGNESIUM: 1.8 MG/DL (ref 1.6–2.6)
MAGNESIUM: 1.9 MG/DL (ref 1.6–2.6)
MAGNESIUM: 2.1 MG/DL (ref 1.6–2.6)
METER GLUCOSE: 152 MG/DL (ref 74–99)
METER GLUCOSE: 161 MG/DL (ref 74–99)
METER GLUCOSE: 168 MG/DL (ref 74–99)
METER GLUCOSE: 184 MG/DL (ref 74–99)
METER GLUCOSE: 195 MG/DL (ref 74–99)
METER GLUCOSE: 204 MG/DL (ref 74–99)
METHB: 0.2 % (ref 0–1.5)
METHB: 0.3 % (ref 0–1.5)
MODE: AC
MODE: AC
O2 SATURATION: 99.6 % (ref 92–98.5)
O2 SATURATION: 99.8 % (ref 92–98.5)
O2HB: 98.1 % (ref 94–97)
O2HB: 98.5 % (ref 94–97)
OPERATOR ID: 914
OPERATOR ID: 914
PATIENT TEMP: 37 C
PATIENT TEMP: 37 C
PCO2: 29.2 MMHG (ref 35–45)
PCO2: 30.5 MMHG (ref 35–45)
PEEP/CPAP: 5 CMH2O
PEEP/CPAP: 5 CMH2O
PFO2: 2.27 MMHG/%
PFO2: 3.26 MMHG/%
PH BLOOD GAS: 7.47 (ref 7.35–7.45)
PH BLOOD GAS: 7.51 (ref 7.35–7.45)
PHOSPHORUS: 1.8 MG/DL (ref 2.5–4.5)
PHOSPHORUS: 2.2 MG/DL (ref 2.5–4.5)
PHOSPHORUS: 2.5 MG/DL (ref 2.5–4.5)
PO2: 227 MMHG (ref 75–100)
PO2: 325.6 MMHG (ref 75–100)
POTASSIUM REFLEX MAGNESIUM: 3.6 MMOL/L (ref 3.5–5)
POTASSIUM REFLEX MAGNESIUM: 3.7 MMOL/L (ref 3.5–5)
POTASSIUM SERPL-SCNC: 3.5 MMOL/L (ref 3.5–5)
POTASSIUM SERPL-SCNC: 3.7 MMOL/L (ref 3.5–5)
RI(T): 1.02
RI(T): 1.9
RR MECHANICAL: 14 B/MIN
RR MECHANICAL: 14 B/MIN
SODIUM BLD-SCNC: 132 MMOL/L (ref 132–146)
SODIUM BLD-SCNC: 136 MMOL/L (ref 132–146)
SODIUM BLD-SCNC: 138 MMOL/L (ref 132–146)
SOURCE, BLOOD GAS: ABNORMAL
SOURCE, BLOOD GAS: ABNORMAL
THB: 7.5 G/DL (ref 11.5–16.5)
THB: 7.5 G/DL (ref 11.5–16.5)
TIME ANALYZED: 2047
TIME ANALYZED: 2105
TOTAL PROTEIN: 4.6 G/DL (ref 6.4–8.3)
TOTAL PROTEIN: 4.7 G/DL (ref 6.4–8.3)
VT MECHANICAL: 350 ML
VT MECHANICAL: 350 ML

## 2022-08-10 PROCEDURE — 84132 ASSAY OF SERUM POTASSIUM: CPT

## 2022-08-10 PROCEDURE — 82805 BLOOD GASES W/O2 SATURATION: CPT

## 2022-08-10 PROCEDURE — 6370000000 HC RX 637 (ALT 250 FOR IP): Performed by: SPECIALIST

## 2022-08-10 PROCEDURE — 99233 SBSQ HOSP IP/OBS HIGH 50: CPT | Performed by: INTERNAL MEDICINE

## 2022-08-10 PROCEDURE — 2500000003 HC RX 250 WO HCPCS: Performed by: INTERNAL MEDICINE

## 2022-08-10 PROCEDURE — 82330 ASSAY OF CALCIUM: CPT

## 2022-08-10 PROCEDURE — 85018 HEMOGLOBIN: CPT

## 2022-08-10 PROCEDURE — 6360000002 HC RX W HCPCS: Performed by: INTERNAL MEDICINE

## 2022-08-10 PROCEDURE — 6370000000 HC RX 637 (ALT 250 FOR IP): Performed by: INTERNAL MEDICINE

## 2022-08-10 PROCEDURE — 93005 ELECTROCARDIOGRAM TRACING: CPT | Performed by: INTERNAL MEDICINE

## 2022-08-10 PROCEDURE — C9113 INJ PANTOPRAZOLE SODIUM, VIA: HCPCS | Performed by: INTERNAL MEDICINE

## 2022-08-10 PROCEDURE — 71045 X-RAY EXAM CHEST 1 VIEW: CPT

## 2022-08-10 PROCEDURE — 2580000003 HC RX 258: Performed by: INTERNAL MEDICINE

## 2022-08-10 PROCEDURE — 37799 UNLISTED PX VASCULAR SURGERY: CPT

## 2022-08-10 PROCEDURE — 80048 BASIC METABOLIC PNL TOTAL CA: CPT

## 2022-08-10 PROCEDURE — 94003 VENT MGMT INPAT SUBQ DAY: CPT

## 2022-08-10 PROCEDURE — 2500000003 HC RX 250 WO HCPCS

## 2022-08-10 PROCEDURE — 82533 TOTAL CORTISOL: CPT

## 2022-08-10 PROCEDURE — 85014 HEMATOCRIT: CPT

## 2022-08-10 PROCEDURE — 83735 ASSAY OF MAGNESIUM: CPT

## 2022-08-10 PROCEDURE — 82962 GLUCOSE BLOOD TEST: CPT

## 2022-08-10 PROCEDURE — A4216 STERILE WATER/SALINE, 10 ML: HCPCS | Performed by: INTERNAL MEDICINE

## 2022-08-10 PROCEDURE — 6360000002 HC RX W HCPCS

## 2022-08-10 PROCEDURE — 36415 COLL VENOUS BLD VENIPUNCTURE: CPT

## 2022-08-10 PROCEDURE — 80053 COMPREHEN METABOLIC PANEL: CPT

## 2022-08-10 PROCEDURE — 6370000000 HC RX 637 (ALT 250 FOR IP): Performed by: FAMILY MEDICINE

## 2022-08-10 PROCEDURE — 84100 ASSAY OF PHOSPHORUS: CPT

## 2022-08-10 RX ORDER — MIDODRINE HYDROCHLORIDE 5 MG/1
5 TABLET ORAL ONCE
Status: COMPLETED | OUTPATIENT
Start: 2022-08-10 | End: 2022-08-10

## 2022-08-10 RX ORDER — POTASSIUM CHLORIDE 29.8 MG/ML
20 INJECTION INTRAVENOUS ONCE
Status: DISCONTINUED | OUTPATIENT
Start: 2022-08-10 | End: 2022-08-10 | Stop reason: CLARIF

## 2022-08-10 RX ORDER — POTASSIUM CHLORIDE 7.45 MG/ML
10 INJECTION INTRAVENOUS
Status: ACTIVE | OUTPATIENT
Start: 2022-08-10 | End: 2022-08-10

## 2022-08-10 RX ORDER — POTASSIUM CHLORIDE 7.45 MG/ML
10 INJECTION INTRAVENOUS
Status: DISCONTINUED | OUTPATIENT
Start: 2022-08-10 | End: 2022-08-11 | Stop reason: HOSPADM

## 2022-08-10 RX ORDER — MAGNESIUM SULFATE IN WATER 40 MG/ML
2000 INJECTION, SOLUTION INTRAVENOUS ONCE
Status: COMPLETED | OUTPATIENT
Start: 2022-08-10 | End: 2022-08-10

## 2022-08-10 RX ORDER — FENTANYL CITRATE 50 UG/ML
25 INJECTION, SOLUTION INTRAMUSCULAR; INTRAVENOUS EVERY 4 HOURS PRN
Status: DISCONTINUED | OUTPATIENT
Start: 2022-08-10 | End: 2022-08-11 | Stop reason: HOSPADM

## 2022-08-10 RX ADMIN — MIDODRINE HYDROCHLORIDE 5 MG: 5 TABLET ORAL at 15:22

## 2022-08-10 RX ADMIN — ALLOPURINOL 100 MG: 100 TABLET ORAL at 08:17

## 2022-08-10 RX ADMIN — MIDODRINE HYDROCHLORIDE 10 MG: 10 TABLET ORAL at 12:03

## 2022-08-10 RX ADMIN — MEROPENEM 1000 MG: 1 INJECTION, POWDER, FOR SOLUTION INTRAVENOUS at 15:17

## 2022-08-10 RX ADMIN — MIDODRINE HYDROCHLORIDE 10 MG: 10 TABLET ORAL at 08:16

## 2022-08-10 RX ADMIN — SODIUM CHLORIDE 40 MG: 9 INJECTION INTRAMUSCULAR; INTRAVENOUS; SUBCUTANEOUS at 08:17

## 2022-08-10 RX ADMIN — ANTICOAGULANT SODIUM CITRATE SOLUTION: 4 SOLUTION INTRAVENOUS at 12:48

## 2022-08-10 RX ADMIN — ANTICOAGULANT SODIUM CITRATE SOLUTION: 4 SOLUTION INTRAVENOUS at 02:55

## 2022-08-10 RX ADMIN — ANTICOAGULANT SODIUM CITRATE SOLUTION: 4 SOLUTION INTRAVENOUS at 21:14

## 2022-08-10 RX ADMIN — SODIUM PHOSPHATE, MONOBASIC, MONOHYDRATE AND SODIUM PHOSPHATE, DIBASIC, ANHYDROUS 12 MMOL: 276; 142 INJECTION, SOLUTION INTRAVENOUS at 14:51

## 2022-08-10 RX ADMIN — INSULIN LISPRO 1 UNITS: 100 INJECTION, SOLUTION INTRAVENOUS; SUBCUTANEOUS at 09:06

## 2022-08-10 RX ADMIN — SODIUM CHLORIDE, PRESERVATIVE FREE 10 ML: 5 INJECTION INTRAVENOUS at 08:50

## 2022-08-10 RX ADMIN — EPOETIN ALFA-EPBX 40000 UNITS: 40000 INJECTION, SOLUTION INTRAVENOUS; SUBCUTANEOUS at 08:20

## 2022-08-10 RX ADMIN — VASOPRESSIN 0.03 UNITS/MIN: 20 INJECTION INTRAVENOUS at 21:05

## 2022-08-10 RX ADMIN — Medication: at 18:20

## 2022-08-10 RX ADMIN — Medication: at 03:35

## 2022-08-10 RX ADMIN — CALCIUM GLUCONATE 1000 MG: 98 INJECTION, SOLUTION INTRAVENOUS at 14:43

## 2022-08-10 RX ADMIN — Medication 55 MCG/MIN: at 21:31

## 2022-08-10 RX ADMIN — DOXYCYCLINE HYCLATE 100 MG: 100 CAPSULE ORAL at 08:18

## 2022-08-10 RX ADMIN — BRIMONIDINE TARTRATE 1 DROP: 2 SOLUTION OPHTHALMIC at 08:48

## 2022-08-10 RX ADMIN — Medication: at 09:05

## 2022-08-10 RX ADMIN — MIDODRINE HYDROCHLORIDE 15 MG: 10 TABLET ORAL at 17:30

## 2022-08-10 RX ADMIN — ATORVASTATIN CALCIUM 80 MG: 40 TABLET, FILM COATED ORAL at 20:33

## 2022-08-10 RX ADMIN — GABAPENTIN 100 MG: 100 CAPSULE ORAL at 20:33

## 2022-08-10 RX ADMIN — ANTICOAGULANT SODIUM CITRATE SOLUTION: 4 SOLUTION INTRAVENOUS at 07:51

## 2022-08-10 RX ADMIN — CYANOCOBALAMIN 1000 MCG: 1000 INJECTION, SOLUTION INTRAMUSCULAR at 08:18

## 2022-08-10 RX ADMIN — PETROLATUM: 420 OINTMENT TOPICAL at 20:34

## 2022-08-10 RX ADMIN — Medication 50 MG: at 08:16

## 2022-08-10 RX ADMIN — FOLIC ACID 1 MG: 5 INJECTION, SOLUTION INTRAMUSCULAR; INTRAVENOUS; SUBCUTANEOUS at 08:17

## 2022-08-10 RX ADMIN — POTASSIUM CHLORIDE 20 MEQ: 29.8 INJECTION, SOLUTION INTRAVENOUS at 20:47

## 2022-08-10 RX ADMIN — Medication: at 13:18

## 2022-08-10 RX ADMIN — POTASSIUM CHLORIDE 20 MEQ: 29.8 INJECTION, SOLUTION INTRAVENOUS at 22:41

## 2022-08-10 RX ADMIN — THIAMINE HYDROCHLORIDE 250 MG: 100 INJECTION, SOLUTION INTRAMUSCULAR; INTRAVENOUS at 09:58

## 2022-08-10 RX ADMIN — FLUCONAZOLE IN SODIUM CHLORIDE 200 MG: 2 INJECTION, SOLUTION INTRAVENOUS at 15:55

## 2022-08-10 RX ADMIN — 0.12% CHLORHEXIDINE GLUCONATE 15 ML: 1.2 RINSE ORAL at 20:33

## 2022-08-10 RX ADMIN — 0.12% CHLORHEXIDINE GLUCONATE 15 ML: 1.2 RINSE ORAL at 08:17

## 2022-08-10 RX ADMIN — ANTICOAGULANT SODIUM CITRATE SOLUTION: 4 SOLUTION INTRAVENOUS at 17:18

## 2022-08-10 RX ADMIN — LATANOPROST 1 DROP: 50 SOLUTION OPHTHALMIC at 20:32

## 2022-08-10 RX ADMIN — OXYCODONE HYDROCHLORIDE AND ACETAMINOPHEN 500 MG: 500 TABLET ORAL at 08:16

## 2022-08-10 RX ADMIN — DEXTROSE MONOHYDRATE 150 MG: 50 INJECTION, SOLUTION INTRAVENOUS at 21:23

## 2022-08-10 RX ADMIN — PETROLATUM: 420 OINTMENT TOPICAL at 08:48

## 2022-08-10 RX ADMIN — SODIUM CHLORIDE, PRESERVATIVE FREE 10 ML: 5 INJECTION INTRAVENOUS at 20:33

## 2022-08-10 RX ADMIN — BRIMONIDINE TARTRATE 1 DROP: 2 SOLUTION OPHTHALMIC at 20:32

## 2022-08-10 RX ADMIN — SODIUM CHLORIDE 40 MG: 9 INJECTION INTRAMUSCULAR; INTRAVENOUS; SUBCUTANEOUS at 20:34

## 2022-08-10 RX ADMIN — DOXYCYCLINE HYCLATE 100 MG: 100 CAPSULE ORAL at 20:33

## 2022-08-10 RX ADMIN — MAGNESIUM SULFATE HEPTAHYDRATE 2000 MG: 40 INJECTION, SOLUTION INTRAVENOUS at 16:12

## 2022-08-10 RX ADMIN — CALCIUM CHLORIDE 8000 MG: 100 INJECTION, SOLUTION INTRAVENOUS at 18:04

## 2022-08-10 ASSESSMENT — PAIN SCALES - GENERAL
PAINLEVEL_OUTOF10: 0

## 2022-08-10 ASSESSMENT — PULMONARY FUNCTION TESTS
PIF_VALUE: 20
PIF_VALUE: 22
PIF_VALUE: 22
PIF_VALUE: 21
PIF_VALUE: 23
PIF_VALUE: 20
PIF_VALUE: 21
PIF_VALUE: 35
PIF_VALUE: 23
PIF_VALUE: 20
PIF_VALUE: 22
PIF_VALUE: 21
PIF_VALUE: 22
PIF_VALUE: 19
PIF_VALUE: 20
PIF_VALUE: 20
PIF_VALUE: 21
PIF_VALUE: 20
PIF_VALUE: 21
PIF_VALUE: 21
PIF_VALUE: 20
PIF_VALUE: 20
PIF_VALUE: 22
PIF_VALUE: 21
PIF_VALUE: 20
PIF_VALUE: 22

## 2022-08-10 NOTE — PROGRESS NOTES
Nephrology Progress Note  Patient's Name: eKn Brannon  11:22 AM  8/10/2022        Reason for Consult:  ESRD  Requesting Physician:  Primitivo Maldonado DO    Chief Complaint:  hypotension, altered mental status  History Obtained From:  EHR    History of Present Ilness:    Ken Brannon is a 78 y.o. female with a history of ESRD HD dependent, vertebral osteomyelitis ( s/p antibiotic treatment ) on suppressive antibiotic therapy with doxycycline, CAD, right breast cancer( s/p chemotx ; s/p mastectomy ) ) and several other medical problems listed below. Patient was observed to be slow to response following her hemodialysis treatment at the facility. She was seen in the ED for evaluation. No history of a fall. Initial evaluation in the ED revealed patient have a blood pressure 134/87, pulse of 56 and a temperature of 97.9. She was noted to have intermittent hypotensive episodes. Laboratory data showed WBC 2.5, hemoglobin 8.1, platelet count 61,652. CHEM profile was consistent with her ESRD status. Patient was admitted to telemetry for continuation of care. Patient developed marked hypotension last evening. RRT was called. Initial blood pressure noted to be 80/40. She was given 250 cc normal saline bolus. Subsequently transferred to MICU for further management. Subjective    8/4: worsening mental status last pm , requiring intubation  8/5: Remains intubated; worsening hypotension; pressors being increased  8/6: pt seen in icu, remains intubated and sedated  8/7: pt remains in icu, sedated and intubated  8/8: pt seen in icu, remains intubated. Awaits tx to [facility] or [facility], arden nurse at bedside and with pulmonary. 8/9: pt seen in icu, remains intubated, awaits transfer , arden icu resident  8/10: pt seen in icu, on cvvh now thru temporary line. Still not transferred. Spoke with brother lalo at bedside and sister on phone. Encouraged them to give pt blood but they delcined.  Explained that she could die at any momemt    Allergies:  Furosemide    Current Medications:    thiamine (B-1) 250 mg in sodium chloride 0.9 % 100 mL IVPB, Q24H  fentaNYL (SUBLIMAZE) injection 25 mcg, Q4H PRN  pantoprazole (PROTONIX) 40 mg in sodium chloride (PF) 10 mL injection, Q12H  prismaSATE BGK 4/0/1.2 5,000 mL solution, Continuous  prismaSol BGK 4/0/1.2 dialysis solution, Continuous  anticoagulant sodium citrate 4 GM/100ML solution, Continuous   And  calcium chloride 8,000 mg in sodium chloride 0.9 % 1,000 mL infusion, Continuous  magnesium sulfate 1000 mg in dextrose 5% 100 mL IVPB, PRN  potassium chloride 20 mEq/50 mL IVPB (Central Line), PRN  calcium gluconate 1000 mg in dextrose 5% 100 mL IVPB, PRN   Or  calcium gluconate 2,000 mg in dextrose 5 % 100 mL IVPB, PRN   Or  calcium gluconate 3,000 mg in dextrose 5 % 100 mL IVPB, PRN   Or  calcium gluconate 4,000 mg in dextrose 5 % 100 mL IVPB, PRN  sodium phosphate 6 mmol in sodium chloride 0.9 % 250 mL IVPB, PRN   Or  sodium phosphate 12 mmol in sodium chloride 0.9 % 250 mL IVPB, PRN   Or  sodium phosphate 18 mmol in sodium chloride 0.9 % 500 mL IVPB, PRN   Or  sodium phosphate 24 mmol in sodium chloride 0.9 % 500 mL IVPB, PRN  epoetin fani-epbx (RETACRIT) injection 40,000 Units, Daily  cyanocobalamin injection 1,000 mcg, Daily  midodrine (PROAMATINE) tablet 10 mg, TID WC  [Held by provider] epoetin fani-epbx (RETACRIT) injection 3,000 Units, Once per day on Mon Wed Fri  insulin lispro (HUMALOG) injection vial 0-4 Units, TID WC  insulin lispro (HUMALOG) injection vial 0-4 Units, Nightly  folic acid injection 1 mg, Daily  ascorbic acid (VITAMIN C) tablet 500 mg, Daily  zinc sulfate (ZINCATE) capsule 50 mg, Daily  meropenem (MERREM) 1,000 mg in sodium chloride 0.9 % 100 mL IVPB (mini-bag), Q24H  fluconazole (DIFLUCAN) 200 mg IVPB, Q24H  chlorhexidine (PERIDEX) 0.12 % solution 15 mL, BID  [Held by provider] aspirin chewable tablet 81 mg, Daily  perflutren lipid microspheres (DEFINITY) injection 1.65 mg, ONCE PRN  glucose chewable tablet 16 g, PRN  dextrose bolus 10% 125 mL, PRN   Or  dextrose bolus 10% 250 mL, PRN  glucagon (rDNA) injection 1 mg, PRN  dextrose 10 % infusion, Continuous PRN  norepinephrine (LEVOPHED) 16 mg in dextrose 5% 250 mL infusion, Continuous  doxycycline hyclate (VIBRAMYCIN) capsule 100 mg, 2 times per day  gabapentin (NEURONTIN) capsule 100 mg, Nightly  white petrolatum ointment, TID PRN  sodium chloride flush 0.9 % injection 5-40 mL, 2 times per day  sodium chloride flush 0.9 % injection 5-40 mL, PRN  0.9 % sodium chloride infusion, PRN  ondansetron (ZOFRAN-ODT) disintegrating tablet 4 mg, Q8H PRN   Or  ondansetron (ZOFRAN) injection 4 mg, Q6H PRN  polyethylene glycol (GLYCOLAX) packet 17 g, Daily PRN  acetaminophen (TYLENOL) tablet 650 mg, Q6H PRN   Or  acetaminophen (TYLENOL) suppository 650 mg, Q6H PRN  allopurinol (ZYLOPRIM) tablet 100 mg, Daily  atorvastatin (LIPITOR) tablet 80 mg, Nightly  brimonidine (ALPHAGAN) 0.2 % ophthalmic solution 1 drop, BID  [Held by provider] bumetanide (BUMEX) tablet 2 mg, Once per day on Sun Tue Thu  lidocaine-prilocaine (EMLA) cream 1 Dose, See Admin Instructions  latanoprost (XALATAN) 0.005 % ophthalmic solution 1 drop, Nightly  [Held by provider] oxyCODONE-acetaminophen (PERCOCET) 5-325 MG per tablet 2 tablet, Q8H PRN  [Held by provider] sertraline (ZOLOFT) tablet 25 mg, Daily  [Held by provider] ticagrelor (BRILINTA) tablet 90 mg, BID  white petrolatum ointment, BID  [Held by provider] heparin (porcine) injection 5,000 Units, Q8H      Review of Systems:   Review of systems not obtained due to patient factors. Physical exam:  Vitals:    08/10/22 1000   BP: (!) 133/47   Pulse: 77   Resp: 14   Temp: (!) 92.7 °F (33.7 °C)   SpO2:           General: intubated, sedated  Eyes: PERRL. No sclera icterus. No conjunctival injection. ENT: No discharge. Pharynx clear. Neck: Trachea midline. Normal thyroid. Lungs: No accessory muscle use. few scattered rhonchi  Chest: R upper chest wall small wound, dressing applied  CV: Regular rate. Regular rhythm. No murmur or rub. .   Abd: Non-tender. Non-distended. No masses. No organmegaly. Normal bowel sounds. Skin: Warm and dry. No nodule on exposed extremities. No rash on exposed extremities. Ext: bilateral UE edema; RUE sleeve, +lymphedema; LUE AVFgood thrill and bruit        Data:   Labs:  Lab Results   Component Value Date     08/10/2022     (L) 08/09/2022     (L) 08/07/2022    K 3.7 08/10/2022    K 3.9 08/09/2022    K 4.5 08/07/2022     08/10/2022    CO2 20 (L) 08/10/2022    CO2 20 (L) 08/09/2022    CO2 19 (L) 08/07/2022    CREATININE 4.7 (H) 08/10/2022    CREATININE 4.4 (H) 08/09/2022    CREATININE 3.5 (H) 08/07/2022    BUN 44 (H) 08/10/2022    BUN 46 (H) 08/09/2022    BUN 32 (H) 08/07/2022    GLUCOSE 188 (H) 08/10/2022    GLUCOSE 243 (H) 08/09/2022    GLUCOSE 237 (H) 08/07/2022    PHOS 2.5 08/10/2022    PHOS 3.1 08/09/2022    PHOS 3.6 08/07/2022    WBC 8.1 08/09/2022    WBC 9.1 08/08/2022    WBC 9.8 08/07/2022    HGB 4.3 (LL) 08/09/2022    HGB 4.8 (LL) 08/08/2022    HGB 5.8 (LL) 08/07/2022    HCT 13.1 (LL) 08/09/2022    HCT 14.1 (LL) 08/08/2022    HCT 17.6 (L) 08/07/2022    MCV 84.5 08/09/2022    PLT 77 (L) 08/09/2022         Imaging:  CT Head WO Contrast    Result Date: 8/1/2022  EXAMINATION: CT OF THE HEAD WITHOUT CONTRAST  8/1/2022 1:09 pm TECHNIQUE: CT of the head was performed without the administration of intravenous contrast. Automated exposure control, iterative reconstruction, and/or weight based adjustment of the mA/kV was utilized to reduce the radiation dose to as low as reasonably achievable. COMPARISON: 07/31/2022 noncontrast head CT. HISTORY: ORDERING SYSTEM PROVIDED HISTORY: altered mental status TECHNOLOGIST PROVIDED HISTORY: Reason for exam:->altered mental status Has a \"code stroke\" or \"stroke alert\" been called? ->No Decision Support Exception - unselect if not a

## 2022-08-10 NOTE — PROGRESS NOTES
Physical Therapy    Pt on PT caseload for re-evaluation. Pt on hold d/t CVVHD. Will attempt back as appropriate.       Shani Bull, PT, DPT  [id number]

## 2022-08-10 NOTE — PROGRESS NOTES
[facility]   Progress note      Subjective:      Remains intubated,   Resting comfortably, eyes open but appears extremely weak and lethargic check although improved compared to yesterday  Hemoglobin has risen to 5.9/17 from 4.3 yesterday  Remains on pressor support    Objective:    BP (!) 120/44   Pulse 98   Temp (!) (P) 95.4 °F (35.2 °C) (Esophageal)   Resp 14   Ht 5' 10\" (1.778 m)   Wt 155 lb 9.6 oz (70.6 kg)   SpO2 (!) 88%   BMI 22.33 kg/m²     In: 2343.8 [I.V.:1453.6; NG/GT:675]  Out: 1735   In: 2343.8   Out: 1735   Intubated eyes open-intubated  HEENT: AT/NC, MMM  Neck: FROM, supple    Lungs: Clear to auscultation  CV: RRR, no MRGs  Vasc: Radial pulses 2+ right-sided in place, purulent drainage  Abdomen: Soft, non-tender; no masses or HSM  Extremities: No peripheral edema or digital cyanosis  Skin: no rash, lesions or ulcers       Recent Labs     08/08/22  1308 08/09/22  0407 08/10/22  1300   WBC 9.1 8.1  --    HGB 4.8* 4.3* 5.9*   HCT 14.1* 13.1* 17.7*   PLT 82* 77*  --        Recent Labs     08/09/22  0407 08/10/22  0047 08/10/22  1307   * 132 136   K 3.9 3.7 3.6    100 100   CO2 20* 20* 22   BUN 46* 44* 30*   CREATININE 4.4* 4.7* 2.6*   CALCIUM 8.9 9.0 8.9       Assessment:    Principal Problem:    Stroke, lacunar (HCC)  Active Problems:    Acute CVA (cerebrovascular accident) ([facility] [address]    CKD (chronic kidney disease) stage 3, GFR 30-59 ml/min (HCC)  Resolved Problems:    * No resolved hospital problems. *      Plan:    81-year-old female with complicated past medical history including but not limited to-ESRD, osteomyelitis, coronary artery disease, previous strokes, CHF presents to the ED with strokelike symptoms and is admitted to telemetry unit with      Sepsis/shock/respiratory failure?   Intubated 8/2/2022 secondary to worsening confusion and respiratory failure  Vent management per ICU team  Transferred to ICU setting 8-2-22 after rapid response team was called for hypotension  Pancultures pending-negative to date  On suppressive Doxy therapy for osteomyelitis of spine-infectious disease following, input appreciated  Patient also has a large decubitus ulcer-Proteus mirabilis and Serratia  Right-sided port has been removed, no purulence at the site noted today  Merrem/Diflucan pending pan culture results empirically  Fecal management system in place now per ID request secondary to patient having feces interfering with lines  and with an open large decubitus ulcer  Remains on Levophed     Acute/subacute lacunar infarct right jerardo  -MRI brain without contrast pending  -Aspirin 81 mg Lipitor 40 mg daily-discontinue aspirin  -Consult neurology -input appreciated  -Check lipid panel and hemoglobin A1c needs tight control of risk factors.   -Monitor blood pressure-adjust medications as needed.  -Echocardiogram with ejection fraction 65%, no PFO     ESRD/anemia-hemoglobin has dropped further to 4.3/13.1 on 8/9/2022  -Hemoglobin has improved on CVVHD via left subclavian   - Left femoral dialysis cath replaced 8/9/2022  Transfuse PRBCs to keep greater than 7-patient does not accept transfusion secondary to Baptism inclination-Evangelical    Patient appears profoundly lethargic although awake and intubated-no plans for transfusion given above social issues  -Dialysis MWF  -Possibility of initiation of CVVHD however no access to initiate such  -Monitor labs       Mild elevation in ALT AST  Continue to monitor daily  No evidence of Daly sign right upper quadrant     DVT Prophylaxis   PT/OT  Discharge planning no    Case discussed with Dr. Sheri Najera disease 8/8/ 22 at bedside  Family not  available at bedside   Case discussed with resident/nursing 8/9/2022  Await transfer to [facility] for nontransfusion related management/treatment in [address] patient      Aden Galeano MD  6:08 PM  8/10/2022

## 2022-08-10 NOTE — PLAN OF CARE
Problem: Discharge Planning  Goal: Discharge to home or other facility with appropriate resources  8/10/2022 1818 by Son Crisostomo RN  Outcome: Not Progressing  Flowsheets  Taken 8/10/2022 1800  Discharge to home or other facility with appropriate resources: Identify barriers to discharge with patient and caregiver  Taken 8/10/2022 0800  Discharge to home or other facility with appropriate resources: Identify barriers to discharge with patient and caregiver     Problem: Cardiovascular - Adult  Goal: Maintains optimal cardiac output and hemodynamic stability  8/10/2022 1818 by Son Crisostomo RN  Outcome: Not Progressing  Flowsheets  Taken 8/10/2022 1800 by Son Crisostomo RN  Maintains optimal cardiac output and hemodynamic stability: Monitor blood pressure and heart rate  Taken 8/10/2022 1600 by Son Crisostomo RN  Maintains optimal cardiac output and hemodynamic stability: Monitor blood pressure and heart rate  Taken 8/10/2022 0800 by Caitlin Taoer  Maintains optimal cardiac output and hemodynamic stability:   Monitor blood pressure and heart rate   Monitor urine output and notify Licensed Independent Practitioner for values outside of normal range

## 2022-08-10 NOTE — PROGRESS NOTES
Patient seen and examined. Temporary dialysis line is appropriate is functioning appropriately. She is undergoing CVVHD at rate of net even. No issues with overnight. At the site there is no hematoma or bleeding. Please page with any questions or concerns.       Jose Bang MD  PGY-4

## 2022-08-10 NOTE — PLAN OF CARE
Problem: Chronic Conditions and Co-morbidities  Goal: Patient's chronic conditions and co-morbidity symptoms are monitored and maintained or improved  8/10/2022 0420 by Supriya Pavetic  Outcome: Progressing     Problem: Discharge Planning  Goal: Discharge to home or other facility with appropriate resources  8/10/2022 0420 by Supriya Pavetic  Outcome: Not Progressing     Problem: Pain  Goal: Verbalizes/displays adequate comfort level or baseline comfort level  8/10/2022 0420 by Supriya Pavetic  Outcome: Progressing     Problem: Safety - Adult  Goal: Free from fall injury  8/10/2022 0420 by Supriya Pavetic  Outcome: Progressing     Problem: ABCDS Injury Assessment  Goal: Absence of physical injury  8/10/2022 0420 by Supriya Pavetic  Outcome: Progressing     Problem: Skin/Tissue Integrity  Goal: Absence of new skin breakdown  Description: 1. Monitor for areas of redness and/or skin breakdown  2. Assess vascular access sites hourly  3. Every 4-6 hours minimum:  Change oxygen saturation probe site  4. Every 4-6 hours:  If on nasal continuous positive airway pressure, respiratory therapy assess nares and determine need for appliance change or resting period.   8/10/2022 0420 by Pio Wayne  Outcome: Progressing     Problem: Cardiovascular - Adult  Goal: Maintains optimal cardiac output and hemodynamic stability  8/10/2022 0420 by Supriya Pavetic  Outcome: Not Progressing     Problem: Metabolic/Fluid and Electrolytes - Adult  Goal: Hemodynamic stability and optimal renal function maintained  8/10/2022 0420 by Supriya Pavetic  Outcome: Not Progressing     Problem: Respiratory - Adult  Goal: Achieves optimal ventilation and oxygenation  8/10/2022 0420 by Supriya Pavetic  Outcome: Progressing     Problem: Nutrition Deficit:  Goal: Optimize nutritional status  8/10/2022 0420 by Supriya Pavetic  Outcome: Not Progressing     Problem: Discharge Planning  Goal: Discharge to home or other facility with appropriate resources  8/10/2022 0420 by Supriya Wayne  Outcome: Not Progressing  8/9/2022 1609 by Shaheed Bunch  Outcome: Not Progressing  Flowsheets (Taken 8/9/2022 1600)  Discharge to home or other facility with appropriate resources: Identify barriers to discharge with patient and caregiver     Problem: Cardiovascular - Adult  Goal: Maintains optimal cardiac output and hemodynamic stability  8/10/2022 0420 by Supriya Wayne  Outcome: Not Progressing  8/9/2022 1609 by Patricia Jama  Outcome: Progressing     Problem: Metabolic/Fluid and Electrolytes - Adult  Goal: Hemodynamic stability and optimal renal function maintained  8/10/2022 0420 by Supriya Wayne  Outcome: Not Progressing  8/9/2022 1609 by Patricia Jama  Outcome: Not Progressing  Flowsheets (Taken 8/9/2022 1600)  Hemodynamic stability and optimal renal function maintained: Monitor labs and assess for signs and symptoms of volume excess or deficit     Problem: Nutrition Deficit:  Goal: Optimize nutritional status  8/10/2022 0420 by Supriya Wayne  Outcome: Not Progressing  8/9/2022 1609 by Patricia Jama  Outcome: Progressing

## 2022-08-10 NOTE — PROGRESS NOTES
[address] [place] [address] [facility]  Progress Note      C/C : resp failure      SUBJECTIVE:  Patient is tolerating medications. No reported adverse drug reactions. No nausea, vomiting, diarrhea.   Afebrile  Low dose levophed ( 4 mcg)   Off vasopressin  CVVHD started last night  No sedation  Sister present-bleeding scan reviewed with sister  Review of systems:  Could not be obtained     Medications:  Scheduled Meds:   thiamine (VITAMIN B1) IVPB  250 mg IntraVENous Q24H    pantoprazole (PROTONIX) 40 mg injection  40 mg IntraVENous Q12H    epoetin fani-epbx  40,000 Units SubCUTAneous Daily    cyanocobalamin  1,000 mcg IntraMUSCular Daily    midodrine  10 mg Oral TID WC    [Held by provider] epoetin fani-epbx  3,000 Units SubCUTAneous Once per day on Mon Wed Fri    insulin lispro  0-4 Units SubCUTAneous TID     insulin lispro  0-4 Units SubCUTAneous Nightly    folic acid  1 mg IntraVENous Daily    ascorbic acid  500 mg Oral Daily    zinc sulfate  50 mg Oral Daily    meropenem  1,000 mg IntraVENous Q24H    fluconazole  200 mg IntraVENous Q24H    chlorhexidine  15 mL Mouth/Throat BID    [Held by provider] aspirin  81 mg Oral Daily    doxycycline hyclate  100 mg Oral 2 times per day    gabapentin  100 mg Oral Nightly    sodium chloride flush  5-40 mL IntraVENous 2 times per day    allopurinol  100 mg Oral Daily    atorvastatin  80 mg Oral Nightly    brimonidine  1 drop Right Eye BID    [Held by provider] bumetanide  2 mg Oral Once per day on Sun Tue Thu    lidocaine-prilocaine  1 Dose Topical See Admin Instructions    latanoprost  1 drop Both Eyes Nightly    [Held by provider] sertraline  25 mg Oral Daily    [Held by provider] ticagrelor  90 mg Oral BID    white petrolatum   Topical BID    [Held by provider] heparin (porcine)  5,000 Units SubCUTAneous Q8H     Continuous Infusions:   dialysis builder 1,000 mL/hr at 08/10/22 0905    prismaSol BGK 4/0/1.2 1,000 mL/hr at 08/10/22 0905    anticoagulant sodium citrate 100 mL/hr at 08/10/22 0751    And    calcium chloride CRRT infusion 8,000 mg (22 2216)    dextrose      norepinephrine 4 mcg/min (08/10/22 1035)    sodium chloride Stopped (22 1424)     PRN Meds:fentanNYL, magnesium sulfate, potassium chloride, calcium gluconate **OR** calcium gluconate **OR** calcium gluconate **OR** calcium gluconate, sodium phosphate IVPB **OR** sodium phosphate IVPB **OR** sodium phosphate IVPB **OR** sodium phosphate IVPB, perflutren lipid microspheres, glucose, dextrose bolus **OR** dextrose bolus, glucagon (rDNA), dextrose, white petrolatum, sodium chloride flush, sodium chloride, ondansetron **OR** ondansetron, polyethylene glycol, acetaminophen **OR** acetaminophen, [Held by provider] oxyCODONE-acetaminophen    OBJECTIVE:  BP (!) 130/45   Pulse 75   Temp (!) 93 °F (33.9 °C) (Esophageal)   Resp 14   Ht 5' 10\" (1.778 m)   Wt 155 lb 9.6 oz (70.6 kg)   SpO2 97%   BMI 22.33 kg/m²   Temp  Av.5 °F (34.7 °C)  Min: 91.4 °F (33 °C)  Max: 97 °F (36.1 °C)  Constitutional: The patient is intubated, opened eyes, sedated   Skin: Warm and dry. No rashes were noted. 2022 chest    2022 coccyx  HEENT: Round and reactive pupils. Moist mucous membranes. No ulcerations or thrush. Neck: Supple to movements. Chest: No use of accessory muscles to breathe. Symmetrical expansion. No wheezing, crackles or rhonchi. Cardiovascular: S1 and S2 are rhythmic and regular. No murmurs appreciated. Abdomen: Positive bowel sounds to auscultation. Benign to palpation. No masses felt. No hepatosplenomegaly. Genitourinary: Gant  Extremities: No clubbing, no cyanosis, bilateral upper and lower extremity edema.   Lines: peripheral  8/3/2022 right femoral art line  2022 left femoral triple-lumen catheter changed on 2022 the left subclavian  2022 left femoral hemodialysis access      Laboratory and Tests Review:  Lab Results   Component Value Date    WBC 8.1 2022 WBC 9.1 08/08/2022    WBC 9.8 08/07/2022    HGB 4.3 (LL) 08/09/2022    HCT 13.1 (LL) 08/09/2022    MCV 84.5 08/09/2022    PLT 77 (L) 08/09/2022     Lab Results   Component Value Date    NEUTROABS 7.78 (H) 08/09/2022    NEUTROABS 8.28 (H) 08/08/2022    NEUTROABS 9.31 (H) 08/07/2022     Lab Results   Component Value Date    CRPHS 0.7 08/16/2016    CRPHS 2.6 04/05/2016    CRPHS 7.6 (H) 01/28/2015     Lab Results   Component Value Date    ALT 51 (H) 08/10/2022    AST 46 (H) 08/10/2022    ALKPHOS 191 (H) 08/10/2022    BILITOT 0.4 08/10/2022     Lab Results   Component Value Date/Time     08/10/2022 12:47 AM    K 3.7 08/10/2022 12:47 AM     08/10/2022 12:47 AM    CO2 20 08/10/2022 12:47 AM    BUN 44 08/10/2022 12:47 AM    CREATININE 4.7 08/10/2022 12:47 AM    CREATININE 4.4 08/09/2022 04:07 AM    CREATININE 3.5 08/07/2022 04:11 AM    GFRAA 11 08/10/2022 12:47 AM    LABGLOM 11 08/10/2022 12:47 AM    GLUCOSE 188 08/10/2022 12:47 AM    GLUCOSE 46 04/02/2012 11:00 AM    PROT 4.7 08/10/2022 12:47 AM    LABALBU 2.6 08/10/2022 12:47 AM    LABALBU 3.8 04/02/2012 11:00 AM    CALCIUM 9.0 08/10/2022 12:47 AM    BILITOT 0.4 08/10/2022 12:47 AM    ALKPHOS 191 08/10/2022 12:47 AM    AST 46 08/10/2022 12:47 AM    ALT 51 08/10/2022 12:47 AM     Lab Results   Component Value Date    CRP 1.4 (H) 07/11/2022    CRP 7.7 (H) 04/13/2022    CRP 0.7 (H) 01/20/2022     Lab Results   Component Value Date    SEDRATE 30 (H) 06/15/2022    SEDRATE 81 (H) 04/13/2022    SEDRATE 19 01/20/2022     Radiology:  Bleeding scan-no active bleed  CT scan chest -  Reviewed with sister-  Chronic osteomyelitis discitis complex at L1-L2, with an indwelling  spinal   stimulator.      Microbiology:   Lab Results   Component Value Date/Time    BC 24 Hours no growth 08/06/2022 08:08 AM    BC 5 Days no growth 08/01/2022 12:33 PM    BC 5 Days no growth 07/11/2022 02:05 PM    ORG Staphylococcus coagulase-negative 08/07/2022 07:49 PM    ORG Proteus mirabilis 08/03/2022 06:39 PM    ORG Serratia marcescens 08/03/2022 06:39 PM     Lab Results   Component Value Date/Time    BLOODCULT2 24 Hours no growth 08/06/2022 08:08 AM    BLOODCULT2 5 Days no growth 08/01/2022 12:43 PM    BLOODCULT2 5 Days no growth 07/11/2022 02:05 PM    ORG Staphylococcus coagulase-negative 08/07/2022 07:49 PM    ORG Proteus mirabilis 08/03/2022 06:39 PM    ORG Serratia marcescens 08/03/2022 06:39 PM     WOUND/ABSCESS   Date Value Ref Range Status   08/03/2022 Moderate growth  Final   08/03/2022 Heavy growth  Final   12/20/2021 Heavy growth  Final   12/20/2021 Light growth  Final     Smear, Respiratory   Date Value Ref Range Status   06/16/2022   Final    Few Polymorphonuclear leukocytes  Epithelial cells not seen  Moderate Gram positive cocci in pairs       No results found for: MPNEUMO, CLAMYDCU, LABLEGI, AFBCX, FUNGSM, LABFUNG  CULTURE, RESPIRATORY   Date Value Ref Range Status   06/16/2022 Oral Pharyngeal Cas reduced  Final     Culture Catheter Tip   Date Value Ref Range Status   08/07/2022 <15 colonies  Final     No results found for: BFCS  Culture Surgical   Date Value Ref Range Status   04/18/2022 Growth not present  Final     Urine Culture, Routine   Date Value Ref Range Status   11/07/2017 Growth not present  Final   09/22/2014   Final    ,000 CFU/mL  Mixed cas isolated. Further workup and sensitivity testing  is not routinely indicated and will not be performed.   Mixed cas isolated includes:  Mixed gram positive organisms  Gram negative rods       MRSA Culture Only   Date Value Ref Range Status   08/04/2022 Methicillin resistant Staph aureus not isolated  Final   07/11/2022 Methicillin resistant Staph aureus not isolated  Final   10/04/2014 Methicillin resistant Staph aureus not isolated  Final     Microbiology   8/7/2022 catheter tip -  8/6/2022 blood culture negative x2  8/1/2022 blood cultures x2 negative  8/4/2022 respiratory nasal swab negative for MRSA  8/3/2022 wound on the chest Proteus and Serratia         Specimen: Chest Updated: 08/05/22 1010    Organism Proteus mirabilis Abnormal     WOUND/ABSCESS Moderate growth    Organism Serratia marcescens Abnormal     WOUND/ABSCESS Heavy growth   Narrative:     Source: CHEST       Site: Right             Culture, Urine [[id number]]            ASSESSMENT:    New acute lacunar infarct  Treated for vertebral osteomyelitis and now on suppressive doxycycline  Rule out aspiration pneumonia-chest x-ray -is clear and the CT scan are more consistent with fluid in the lower bases  Neutropenia-improved however hemoglobin down to 4.3  Chest and sacral wounds are stable, do not appear infected and colonized  Reviewed cultures from [facility] as well as [address] may be infected by fecal contamination-changed  Resp failure - intubated     PLAN:  Continue doxycycline suppressive, meropenem 1 gram IV q day day 5 of 7, diflucan 200 mg IV q day day 5 of 10 ending any positive cultures from the fecal contamination of the dressings in the groin  Follow clinically  Check final cultures  Viviana Tellez MD  11:43 AM  8/10/2022

## 2022-08-10 NOTE — CARE COORDINATION
8/10:  Update CM Note:  Pt [facility] [address] [facility] Pt was a RRT on 8/2 for AMS. Pt is a HD M-W-F  at [facility]. Pt's sister Silvino Leslie confirmed the plan is for pt to return to [address]. Pt is intubated, Iv Thiamine, Iv Protonix, Iv Folic Acid, Iv Diflucan, Iv Merrem & Iv Levophed. Pt is currently on CVVHD. Pt is a Jehov[employer] Witness & plan to transfer to outside facility bloodless medicine. CM called access center & was advise that the transfer was cancelled at 616pm 8/9. Cm spoke with ICU team & verified that they still wanted a outside facility that offered bloodless medicine. If transfer to [place] facility will need to obtain prior authorization. Will need Covid test.  Ambulance form place in soft chart. Sw/CM will continue to follow for dc planning.  Electronically signed by Bill Ramirez RN on 8/10/2022 at 4:08 PM

## 2022-08-10 NOTE — PLAN OF CARE
need for suctioning and aspirate as needed  Taken 8/10/2022 1600 by Iker Bledsoe RN  Achieves optimal ventilation and oxygenation:   Assess for changes in respiratory status   Assess for changes in mentation and behavior   Position to facilitate oxygenation and minimize respiratory effort   Oxygen supplementation based on oxygen saturation or arterial blood gases   Assess the need for suctioning and aspirate as needed  Taken 8/10/2022 0800 by Lidia Botello  Achieves optimal ventilation and oxygenation:   Assess for changes in respiratory status   Assess for changes in mentation and behavior   Oxygen supplementation based on oxygen saturation or arterial blood gases   Assess the need for suctioning and aspirate as needed

## 2022-08-10 NOTE — PLAN OF CARE
Problem: Discharge Planning  Goal: Discharge to home or other facility with appropriate resources  8/10/2022 1818 by Lilia Johnson RN  Outcome: Not Progressing  Flowsheets  Taken 8/10/2022 1800  Discharge to home or other facility with appropriate resources: Identify barriers to discharge with patient and caregiver  Taken 8/10/2022 0800  Discharge to home or other facility with appropriate resources: Identify barriers to discharge with patient and caregiver  8/10/2022 0420 by Supriya Wayne  Outcome: Not Progressing     Problem: Cardiovascular - Adult  Goal: Maintains optimal cardiac output and hemodynamic stability  8/10/2022 1818 by Lilia Johnson RN  Outcome: Not Progressing  Flowsheets  Taken 8/10/2022 1800 by Lilia Johnson RN  Maintains optimal cardiac output and hemodynamic stability: Monitor blood pressure and heart rate  Taken 8/10/2022 1600 by Lilia Johnson RN  Maintains optimal cardiac output and hemodynamic stability: Monitor blood pressure and heart rate  Taken 8/10/2022 0800 by Suzette Poole  Maintains optimal cardiac output and hemodynamic stability:   Monitor blood pressure and heart rate   Monitor urine output and notify Licensed Independent Practitioner for values outside of normal range  8/10/2022 0420 by Supriya Wayne  Outcome: Not Progressing     Problem: Discharge Planning  Goal: Discharge to home or other facility with appropriate resources  8/10/2022 1818 by Lilia Johnson RN  Outcome: Not Progressing  Flowsheets  Taken 8/10/2022 1800  Discharge to home or other facility with appropriate resources: Identify barriers to discharge with patient and caregiver  Taken 8/10/2022 0800  Discharge to home or other facility with appropriate resources: Identify barriers to discharge with patient and caregiver  8/10/2022 0420 by Supriya Wayne  Outcome: Not Progressing     Problem: Cardiovascular - Adult  Goal: Maintains optimal cardiac output and hemodynamic stability  8/10/2022 1818 by Kaela Simmons RN  Outcome: Not Progressing  Flowsheets  Taken 8/10/2022 1800 by Kaela Simmons RN  Maintains optimal cardiac output and hemodynamic stability: Monitor blood pressure and heart rate  Taken 8/10/2022 1600 by Kaela Simmons RN  Maintains optimal cardiac output and hemodynamic stability: Monitor blood pressure and heart rate  Taken 8/10/2022 0800 by Tania Richter  Maintains optimal cardiac output and hemodynamic stability:   Monitor blood pressure and heart rate   Monitor urine output and notify Licensed Independent Practitioner for values outside of normal range  8/10/2022 0420 by Supriya Wayne  Outcome: Not Progressing     Problem: Metabolic/Fluid and Electrolytes - Adult  Goal: Hemodynamic stability and optimal renal function maintained  8/10/2022 1818 by Kaela Simmons RN  Outcome: Progressing  Flowsheets  Taken 8/10/2022 1800  Hemodynamic stability and optimal renal function maintained: Monitor labs and assess for signs and symptoms of volume excess or deficit  Taken 8/10/2022 0800  Hemodynamic stability and optimal renal function maintained: Monitor labs and assess for signs and symptoms of volume excess or deficit  8/10/2022 0420 by Supriya Wayne  Outcome: Not Progressing     Problem: Nutrition Deficit:  Goal: Optimize nutritional status  8/10/2022 1818 by Kaela Simmons RN  Outcome: Progressing  8/10/2022 0420 by Supriya Wayne  Outcome: Not Progressing

## 2022-08-11 LAB
BLOOD CULTURE, ROUTINE: NORMAL
CULTURE, BLOOD 2: NORMAL
EKG ATRIAL RATE: 156 BPM
EKG ATRIAL RATE: 77 BPM
EKG P AXIS: 50 DEGREES
EKG P-R INTERVAL: 180 MS
EKG Q-T INTERVAL: 284 MS
EKG Q-T INTERVAL: 522 MS
EKG QRS DURATION: 200 MS
EKG QRS DURATION: 92 MS
EKG QTC CALCULATION (BAZETT): 457 MS
EKG QTC CALCULATION (BAZETT): 590 MS
EKG R AXIS: -36 DEGREES
EKG R AXIS: 21 DEGREES
EKG T AXIS: -174 DEGREES
EKG T AXIS: 134 DEGREES
EKG VENTRICULAR RATE: 156 BPM
EKG VENTRICULAR RATE: 77 BPM

## 2022-08-11 PROCEDURE — 99233 SBSQ HOSP IP/OBS HIGH 50: CPT | Performed by: INTERNAL MEDICINE

## 2022-08-11 NOTE — PROGRESS NOTES
CVVHD stopped at this time due to patient transfer to [facility], blood returned, citrate and CaCl stopped at this time, patient tolerated well

## 2022-08-11 NOTE — PROGRESS NOTES
[address] [facility]  Department of Internal Medicine   Internal Medicine Residency   MICU Progress Note    Patient:  Daryle Staggers 78 y.o. female  MRN: [id number]     Date of Service: 8/10/2022    Allergy: Furosemide    Subjective     Patient is sedated and intubated. Patient follows commands such us blinking and nodding head. Levophed resumed 2/2 soft BP following CVVHD-F        Objective     VITAL SIGNS:  BP (!) 106/39   Pulse (!) 136   Temp (!) 96.4 °F (35.8 °C) (Esophageal)   Resp 15   Ht 5' 10\" (1.778 m)   Wt 155 lb 9.6 oz (70.6 kg)   SpO2 100%   BMI 22.33 kg/m²   Tmax over 24 hours:  Temp (24hrs), Av.4 °F (34.7 °C), Min:91.4 °F (33 °C), Max:97 °F (36.1 °C)      Patient Vitals for the past 6 hrs:   BP Temp Temp src Pulse Resp SpO2   08/10/22 2000 (!) 106/39 (!) 96.4 °F (35.8 °C) Esophageal (!) 136 15 100 %   08/10/22 1955 -- -- -- (!) 117 -- 95 %   08/10/22 1900 -- -- -- (!) 115 14 100 %   08/10/22 1800 (!) 119/42 (!) 95.4 °F (35.2 °C) Esophageal 79 14 90 %   08/10/22 1700 (!) 120/44 (!) 95.9 °F (35.5 °C) Esophageal 98 14 (!) 88 %   08/10/22 1600 (!) 126/44 (!) 95.2 °F (35.1 °C) Esophageal 94 14 93 %   08/10/22 1557 -- -- -- 92 -- 98 %           Intake/Output Summary (Last 24 hours) at 8/10/2022 2132  Last data filed at 8/10/2022 2100  Gross per 24 hour   Intake 1941 ml   Output 2292 ml   Net -351 ml       Wt Readings from Last 2 Encounters:   08/10/22 155 lb 9.6 oz (70.6 kg)   22 200 lb (90.7 kg)     Body mass index is 22.33 kg/m². I & O - 24hr:   Intake/Output Summary (Last 24 hours) at 8/10/2022 2132  Last data filed at 8/10/2022 2100  Gross per 24 hour   Intake 1941 ml   Output 2292 ml   Net -351 ml         Physical Exam:  General Appearance: Patient is intubated.  Rass score 0  Neck: no adenopathy, no carotid bruit, supple, symmetrical, trachea midline, and thyroid not enlarged, symmetric, no tenderness/mass/nodules  Lung: clear to auscultation bilaterally  Heart: regular  100 99  --    CO2 20* 22 21*  --    BUN 44* 30* 22  --    CREATININE 4.7* 2.6* 2.0*  --    GLUCOSE 188* 168* 210*  --    CALCIUM 9.0 8.9 9.2  --    PROT 4.7* 4.6*  --   --    LABALBU 2.6* 2.6*  --   --    BILITOT 0.4 0.5  --   --    ALKPHOS 191* 160*  --   --    AST 46* 43*  --   --    ALT 51* 51*  --   --         Magnesium:   Lab Results   Component Value Date/Time    MG 2.1 08/10/2022 08:42 PM     Phosphorus:   Lab Results   Component Value Date/Time    PHOS 2.2 08/10/2022 08:42 PM     Ionized Calcium:   Lab Results   Component Value Date/Time    CAION 1.00 08/10/2022 08:42 PM      Troponin: No results for input(s): TROPONINI in the last 72 hours. Medications     Infusions: (Fluid, Sedation, Vasopressors)    Levophed 55 mcg/min  Fentanyl held      Nutrition: NG tube feeding renal formula       ATB:   Antibiotics  Days   Meropenem     Vibramycin    Fluconazole      Cultures:     Chest wound culture positive for proteus mirabilis and Serratia       Imaging     CT ABDOMEN PELVIS WO CONTRAST Additional Contrast? None    Result Date: 8/3/2022  Extensive anasarca seen throughout the soft tissues the chest abdomen and pelvis. Irregularity identified at the site of the prior ruth catheter along the right anterior chest wall with small radiopaque density seen in the superior soft tissues suggesting possibly calcification or radiopaque foreign body. Small bilateral pleural effusions with atelectatic changes seen at the lung bases bilaterally or infiltrate. Chronic osteomyelitis involving L1 and L2 with hardware seen within the lower lumbar spine. There is no evidence of acute intra-or pelvic abnormality. Extensive vascular calcifications seen throughout the thoracic and abdominal aorta and mesenteric and iliac vessels. CT HEAD WO CONTRAST    Result Date: 8/4/2022  Stable small hypodensity in the right jerardo, which could represent infarct. However, this should be confirmed with MRI.  No new acute intracranial process identified. CT Head WO Contrast    Result Date: 8/1/2022  Small hypodensity right jerardo not demonstrated previously. Acute or subacute lacunar infarct at this location cannot be excluded. This finding could be more fully evaluated with MRI. CT Head WO Contrast    Result Date: 7/31/2022  No acute intracranial abnormality. Small scalp hematoma overlying the left orbit     CT CHEST WO CONTRAST    Result Date: 8/3/2022  Extensive anasarca seen throughout the soft tissues the chest abdomen and pelvis. Irregularity identified at the site of the prior ruth catheter along the right anterior chest wall with small radiopaque density seen in the superior soft tissues suggesting possibly calcification or radiopaque foreign body. Small bilateral pleural effusions with atelectatic changes seen at the lung bases bilaterally or infiltrate. Chronic osteomyelitis involving L1 and L2 with hardware seen within the lower lumbar spine. There is no evidence of acute intra-or pelvic abnormality. Extensive vascular calcifications seen throughout the thoracic and abdominal aorta and mesenteric and iliac vessels. CT Cervical Spine WO Contrast    Result Date: 7/31/2022  Multilevel degenerative changes seen within the cervical spine with no acute abnormality of the cervical spine. XR CHEST PORTABLE    Result Date: 8/4/2022  No acute process     XR CHEST PORTABLE    Result Date: 8/3/2022  Endotracheal tube and nasogastric tubes in satisfactory position. XR CHEST PORTABLE    Result Date: 8/3/2022  Presumed enteric tube with tip in the distal esophagus. Repositioning is recommended. Enteric tube positioned as described. Bilateral lower lobe infiltrates and small bilateral pleural effusions. XR CHEST PORTABLE    Result Date: 8/1/2022  1. Small patchy opacity within the left lung base which could represent atelectasis or pneumonia. Atelectasis is favored. 2. The right lung is clear.      XR ABDOMEN FOR NG/OG/NE TUBE PLACEMENT    Result Date: 8/3/2022  Catheter is in the right upper abdomen, probably stomach in conjunction with patient positioning. XR CHEST ABDOMEN NG PLACEMENT    Result Date: 8/2/2022  Satisfactory placement of NG tube as described. Resident's Assessment and Plan     Claudia Tsejanell   , 78 y.o. , female came with CC : AMS     has a past medical history of Acute CVA (cerebrovascular accident) ([address] Acute infection of bone ([address] Acute osteomyelitis of phalanx of left hand ([address] Anemia of chronic disease, Arthritis, Breast cancer ([address] CAD (coronary artery disease), Carpal tunnel syndrome, Chronic diastolic CHF (congestive heart failure) ([address] CKD (chronic kidney disease) stage 4, GFR 15-29 ml/min ([address] Diabetic retinopathy ([address] Glaucoma, Hemodialysis patient ([address] Hyperkalemia, diminished renal excretion, Hyperlipidemia, Hypertension, Hypoglycemia unawareness in type 1 diabetes mellitus ([address] Insulin dependent type 2 diabetes mellitus ([address] Neuropathy, Osteomyelitis due to secondary diabetes [facility]), Patient is [employer], Refusal of blood product, Ventricular hypertrophy, Ventricular tachycardia ([address] and Vitreous hemorrhage ([address]          Days since Admission: 10  Days on Ventilator : 8    Consults:   Nephrology, last date of service 8/10  ID, last date of service 7/7  Cardiology, last date of service 8/9   Vascular Surgery, last date of service 8/10  General Surgery, last date of service 8/5  Neurology, last date of service 8/5     Assessment:      Neurology      AMS likely 2/2 Acute/subacute lacunar stroke vs septic shock   CT scan head wo contrast revealed small hypodense region right jerardo  Chest Wound cultures positive for proteus and serratia    MRI was considered for reevaluation of stroke, however due to the presence of a spinal stimulator, repeat CT scan was ordered  Repeat CT scan reveals small hypodensity located in right jerardo, similar to previous CT, not acute intracranial abnormality   EEG reveals diffuse slowing consistent with moderate to severe metabolic encephalopathy  Continue levophed  Hold Fentanyl 50 mcg/hr   Follow neurology recommendations  Brilinta and Asprin held due to low Hb, per cardiology  Continue Antibiotic management as below       Cardiology    Shock likely septic due to infected wound in place of removed Mediport   Acute Hypotension during RRT  Prior to second RRT, patient missed her midodrine dose  Mediport removed 2/2 hardware infection in June 2022  Wound culture positive for Proteus mirabilis and Serratia marcescens  Echo done in August 5 2022,  Ejection fraction 65%, reveals LV concentric hypertrophy   Increase Midodrine to  15 mg TID PO  Bumex held due to low BP  Continue Levophed infusion  Consider adding Vasopressin 0.03 units/min if more vasopressor is needed  MAP goal over 65  [place] [facility]   Inpatient cardiology consultation  Continue to monitor cardiac rhythm       Coronary artery disease status post stent in May 2022  Hold ticagrelor and aspirin  Continue Lipitor 80 mg nightly       History of hyperlipidemia  Continue Lipitor 80 mg nightly         History of heart failure with reduced ejection fraction, ejection fraction 60%, last echo was done in August 6/2022  Bumex held due to hypotension    Hx of hypotension  On midodrine       Pulmonology    Acute respiratory failure on admission 2/2 AMS, currently intubated   Patient on intubation day 8  ABG 7.51/29.2/227/60  Vent settings 350/14/5/60  PEEP 5  Monitor respiratory function  Monitor ABG      Renal    End-stage renal disease on hemodialysis 3 times a week  Has Left brachiocephalic fistula   Patient is unable to receive sustained low efficiency dialysis due to Low Hb  Possible transfer to [facility]  Uremic platelet dysfunction can further bleeding  Triple lumen catheter to inserted in L femoral artery by Vascular Surgery  CVVHD-F done, rate of net even        History of hyperuricemia  Continue allopurinol      Endocrine    History of type 2 diabetes mellitus  Home gil is 5 units Lantus nightly   Hold home dose  Monitor blood sugar levels  On LDSS    Concerns for adrenal insufficiency  Order Cortisol levels,  If less than 19, f/u with stimulation test       Infectious disease    Concern for sepsis/septic shock likely secondary to chest wound infection due to Proteus mirabilis and Serratia   Wound culture positive for Proteus mirabilis and Serratia marcescens  Pancytopenia during RRT code   Low body temperature during RRT  D/C  Zosyn  D/C ceftriaxone  D/C vancomycin  Continue doxycycline 100 mg q12h  Continue  Fluconazole 400mg IV once, followed by 200mg qday  Continue  Meropenem 1000 mg qday  Tip cultures for Femoral central positive for staph coagulase negative species less than 15 Colonies, likely contaminant   Repeat blood culture, no growth   MRSA Nares culture no growth   Follow infectious disease recommendations    Chest wound infection due to Mediport removal  Mediport removed 2/2 hardware infection in June 2022  Vitamin C and Zinc, and thiamine for wound care   Wound care consult      Hematology    Acute on top of Chronic Normocytic anemia likely 2/2  pectoralis hematoma vs unknown bleeding source vs  Current Hb 4.3  Follow CBC every other day   FOBT test  CT scan wo contrast of chest reveal stable pectoralis hematoma   Minimize blood draws to every other day   Hold heparin ppx duet to bleeding risk   Continue Iron, EPO, vitamin C, thiamine, zinc, vitamin C, and B12 supplementation  Iron sat 153%  Reticulocyte count 6.6%  EPO dose modified to 40,000 Sc daily   Tagged Nuclear RBC scan unremarkable   Protonix increased to 40mg BID   Patient is for transfer to [facility], under care of Dr. Chantell Blood     Thrombocytopenia  likely 2/2 unknown bleeding source vs pectoralis hematoma vs uremia  Platelet count 77  BUN levels 22  Consider adding  Ddavp for worsening bleeding if needed 2/2 uremic platelet dysfunction        Pancytopenia on admission likely secondary to infection - resolving   Continue to monitor CBC, WBC count 9.1  Hb 5.9 improving        Cannot receive blood products due to Alevism believes    GI    CT scan of abdomen reveals likely enterocolitis vs ileus        Oncology     Hx of BRCA+ breast malignancy, s/p mastectomy   Right upper extremity lymphedema and diffuse anasarca     PT/OT: unable to evaluate due to CVVHD-F  DVT ppx: Heparin held , sequential compression device   GI ppx: Protonix 40 mg BID        Code Status:   Full     Disposition: [facility] 60 transfer initiatedm patient for transfer to [facility] Cristo Royal MD, PGY-1    Attending physician: Dr. Linda Escamilla  Department of Pulmonary, Critical Care and Sleep Medicine  [address] [facility]  Department of Internal Medicine      During multidisciplinary team rounds Danita Verdin is a 78 y.o. female was seen, examined and discussed. This is confirmation that I have personally seen and examined the patient and that the key elements of the encounter were performed by me (> 85 % time). The medications & laboratory data was discussed and adjusted where necessary. The radiographic images were reviewed or with radiologist or consultant if felt dis-concordant with the exam or history. The above findings were corroborated, plans confirmed and changes made if needed. Family is updated at the bedside as available. Key issues of the case were discussed among consultants. Critical Care time is documented if appropriate.       Nidia Roblero DO, FACP, FCCP, [facility],

## 2022-08-11 NOTE — PROGRESS NOTES
Paged Dr. Perez Smoke via perfect serve, sent to call answering service with no answer at this time, need discharge order and med rec done

## 2022-08-11 NOTE — PLAN OF CARE
Talked to brother Dena Phillips about the transfer of the patient to [facility]. All information was given and all questions answered. Discussed the transportation through helicopter and the receiving physician Dr. Nona Roberson.     Electronically signed by Brianna Gant MD on 8/11/2022 at 2:10 AM

## 2022-08-15 NOTE — PROGRESS NOTES
Physician Progress Note      Lucía Santizo  CSN #:                  [id number]  :                       1955  ADMIT DATE:       2022 11:42 AM  DISCH DATE:        8/10/2022 11:38 PM  RESPONDING  PROVIDER #:        Coretta FRANK          QUERY TEXT:    Pt admitted with CVA and has malnutrition documented. Please further specify   type of malnutrition with documentation in the medical record. The medical record reflects the following:  Risk Factors: Chronic Illness  Clinical Indicators:  8/3/22: Dietician: Malnutrition Status: Severe Malnutrition; Energy Intake:   75% or less estimated energy requirements for 1 month or longer;  Weight loss:   unable to assess; Body Fat Loss: Moderate (orbital):  Muscle Mass Loss:   severe muscle mass loss Temples (temporalis), thigh, calf, clavicles. Patient   meets  criteria for severe malnutrition  22: BMI 20.75  Treatment: Dietician consult; Tube feed; Daily Weights I&O    Thank You,  Margaret IVERSON, R.N.  Clinical Documentation Improvement  [phone]    ASPEN Criteria:    https://aspenjournals. onlinelibrary. samayoa. com/doi/full/10.1177/651791504296799  5  Options provided:  -- Mild Malnutrition  -- Moderate Malnutrition  -- Severe Malnutrition  -- Mild Protein calorie malnutrition  -- Moderate Protein calorie malnutrition  -- Severe Protein calorie malnutrition  -- Other - I will add my own diagnosis  -- Disagree - Not applicable / Not valid  -- Disagree - Clinically unable to determine / Unknown  -- Refer to Clinical Documentation Reviewer    PROVIDER RESPONSE TEXT:    This patient has moderate malnutrition.     Query created by: Shakira Lim on 2022 2:07 PM      Electronically signed by:  Tono Myles 8/15/2022 5:53 AM

## 2022-09-07 NOTE — DISCHARGE SUMMARY
Status post amputation of right great toe (HCC)    Stroke, lacunar ([address]    Acute CVA (cerebrovascular accident) [facility])       Discharge Diagnoses: Acute CVA    Consults: cardiology, ID, nephrology, neurology, and vascular surgery    Procedures:     Hospital Course: The patient is a 78 y.o. female of Xenia Chisholm DO     50-year-old female with complicated past medical history including but not limited to-ESRD, osteomyelitis, coronary artery disease, previous strokes, CHF presents to the ED with strokelike symptoms and is admitted to telemetry unit with        Sepsis/shock/respiratory failure? Intubated 8/2/2022 secondary to worsening confusion and respiratory failure  Vent management per ICU team  Transferred to ICU setting 8-2-22 after rapid response team was called for hypotension  Pancultures pending-negative to date  On suppressive Doxy therapy for osteomyelitis of spine-infectious disease following, input appreciated  Patient also has a large decubitus ulcer-Proteus mirabilis and Serratia  Right-sided port has been removed, no purulence at the site noted today  Merrem/Diflucan pending pan culture results empirically  Fecal management system in place now per ID request secondary to patient having feces interfering with lines  and with an open large decubitus ulcer  Remains on Levophed     Acute/subacute lacunar infarct right jerardo  -MRI brain without contrast pending  -Aspirin 81 mg Lipitor 40 mg daily-discontinue aspirin  -Consult neurology -input appreciated  -Check lipid panel and hemoglobin A1c needs tight control of risk factors.   -Monitor blood pressure-adjust medications as needed.  -Echocardiogram with ejection fraction 65%, no PFO     ESRD/anemia-hemoglobin has dropped further to 4.3/13.1 on 8/9/2022  -Hemoglobin has improved on CVVHD via left subclavian   - Left femoral dialysis cath replaced 8/9/2022  Transfuse PRBCs to keep greater than 7-patient does not accept transfusion secondary to Quaker inclination-Muslim     Patient appears profoundly lethargic although awake and intubated-no plans for transfusion given above social issues  -Dialysis MWF  -Possibility of initiation of CVVHD however no access to initiate such  -Monitor labs        Mild elevation in ALT AST  Continue to monitor daily  No evidence of Daly sign right upper quadrant       No results for input(s): WBC, HGB, HCT, PLT in the last 72 hours. No results for input(s): NA, K, CL, CO2, BUN, CREATININE, GLU, CALCIUM in the last 72 hours. CT Head WO Contrast    Result Date: 8/1/2022  EXAMINATION: CT OF THE HEAD WITHOUT CONTRAST  8/1/2022 1:09 pm TECHNIQUE: CT of the head was performed without the administration of intravenous contrast. Automated exposure control, iterative reconstruction, and/or weight based adjustment of the mA/kV was utilized to reduce the radiation dose to as low as reasonably achievable. COMPARISON: 07/31/2022 noncontrast head CT. HISTORY: ORDERING SYSTEM PROVIDED HISTORY: altered mental status TECHNOLOGIST PROVIDED HISTORY: Reason for exam:->altered mental status Has a \"code stroke\" or \"stroke alert\" been called? ->No Decision Support Exception - unselect if not a suspected or confirmed emergency medical condition->Emergency Medical Condition (MA) What reading provider will be dictating this exam?->CRC FINDINGS: BRAIN/VENTRICLES: The ventricles are normal size, position and contour. Cortical sulci and sylvian fissures are mildly prominent. There are no masses or mass effect. There are no parenchymal hemorrhages or extra-axial fluid collections. There is a small hypodense area in the right jerardo not demonstrated previously. The cerebellum is unremarkable. ORBITS: The visualized portion of the orbits demonstrate no acute abnormality. SINUSES: The visualized paranasal sinuses and mastoid air cells demonstrate no acute abnormality. SOFT TISSUES/SKULL:  No definite fractures.   There is a small left periorbital soft tissue contusion and hematoma, stable to slightly improved. Small hypodensity right jerardo not demonstrated previously. Acute or subacute lacunar infarct at this location cannot be excluded. This finding could be more fully evaluated with MRI. XR CHEST PORTABLE    Result Date: 8/1/2022  EXAMINATION: ONE XRAY VIEW OF THE CHEST 8/1/2022 12:56 pm COMPARISON: Previous chest x-ray of 07/11/2022 and CT scan of 06/15/2022 HISTORY: ORDERING SYSTEM PROVIDED HISTORY: altered TECHNOLOGIST PROVIDED HISTORY: Reason for exam:->altered What reading provider will be dictating this exam?->CRC FINDINGS: The cardiac silhouette is within normal limits. The right lung is clear. There is chronic elevation right hemidiaphragm There is a very small patchy opacity seen within the left lung base which could represent atelectasis or less likely pneumonia. The left upper lobe is clear. 1. Small patchy opacity within the left lung base which could represent atelectasis or pneumonia. Atelectasis is favored. 2. The right lung is clear. XR CHEST ABDOMEN NG PLACEMENT    Result Date: 8/2/2022  EXAMINATION: ONE SUPINE XRAY VIEW(S) OF THE ABDOMEN 8/2/2022 7:00 pm COMPARISON: None. HISTORY: ORDERING SYSTEM PROVIDED HISTORY: hyoptension TECHNOLOGIST PROVIDED HISTORY: Reason for exam:->hyoptension What reading provider will be dictating this exam?->CRC FINDINGS: Single frontal view of the lower chest and upper abdomen demonstrate and NG tube in position with the tip approximately 12 cm below the GE junction in the body of the stomach. There are multiple EKG leads overlying the upper abdomen and there is evidence of previous laminectomy pedicle screw fixation of the lower lumbar spine. Satisfactory placement of NG tube as described. Discharge Exam:    HEENT: NCAT,  PERRLA, No JVD  Heart:  RRR, no murmurs, gallops, or rubs.   Lungs:  CTA bilaterally, no wheeze, rales or rhonchi  Abd: bowel sounds present, nontender, nondistended, no masses  Extrem:  No clubbing, cyanosis, or edema    Disposition: [facility]     Patient Condition at Discharge: Stable    Patient Instructions:      Medication List        CONTINUE taking these medications      acetaminophen 325 MG tablet  Commonly known as: TYLENOL     allopurinol 100 MG tablet  Commonly known as: ZYLOPRIM  Take 1 tablet by mouth daily     atorvastatin 80 MG tablet  Commonly known as: LIPITOR     brimonidine 0.2 % ophthalmic solution  Commonly known as: ALPHAGAN  Place 1 drop into the right eye in the morning and 1 drop before bedtime. gabapentin 100 MG capsule  Commonly known as: NEURONTIN     lidocaine-prilocaine 2.5-2.5 % cream  Commonly known as: EMLA     Lumigan 0.01 % Soln ophthalmic drops  Generic drug: bimatoprost     midodrine 10 MG tablet  Commonly known as: PROAMATINE  Take 1 tablet by mouth in the morning and 1 tablet at noon and 1 tablet in the evening. Take with meals.      mirtazapine 7.5 MG tablet  Commonly known as: REMERON            STOP taking these medications      Acidophilus Lactobacillus Caps     aspirin 81 MG EC tablet     bisacodyl 10 MG suppository  Commonly known as: DULCOLAX     bumetanide 2 MG tablet  Commonly known as: BUMEX     doxycycline hyclate 100 MG capsule  Commonly known as: VIBRAMYCIN     lanthanum 1000 MG chewable tablet  Commonly known as: FOSRENOL     magnesium hydroxide 400 MG/5ML suspension  Commonly known as: MILK OF MAGNESIA     meclizine 12.5 MG tablet  Commonly known as: ANTIVERT     metoprolol succinate 25 MG extended release tablet  Commonly known as: TOPROL XL     omeprazole 20 MG delayed release capsule  Commonly known as: PRILOSEC     ondansetron 4 MG tablet  Commonly known as: ZOFRAN     oxyCODONE-acetaminophen 5-325 MG per tablet  Commonly known as: PERCOCET     [place] Caps 1 MG Caps     Secura Protective 10 % Crea  Generic drug: ZINC OXIDE (TOPICAL)     senna 8.6 MG tablet  Commonly known as: SENOKOT     sertraline 25 MG tablet  Commonly known as: ZOLOFT     ticagrelor 90 MG Tabs tablet  Commonly known as: BRILINTA     vancomycin 1000 MG/200ML Soln  Commonly known as: VANCOCIN            ASK your doctor about these medications      polyethylene glycol 17 g packet  Commonly known as: GLYCOLAX  Take 17 g by mouth in the morning. Ask about: Should I take this medication? Activity: activity as tolerated  Diet: cardiac diet and diabetic diet    Pt has been advised to: Follow-up with Serjio Nichole DO in 1 week.   Follow-up with consultants as recommended by them    Note that over 30 minutes was spent in preparing discharge papers, discussing discharge with patient, medication review, etc.    Signed:  Dionisio Westfall MD  8/10/2022

## 2023-06-09 NOTE — CARE COORDINATION
Care Coordination   She patient remains vented and sedated in [place] fio2 40 %. The patient was transferred from  on 8/2 due to altered mental status. The patient has a historu of ESRD om hemodialysis. She was an rrt due to hypotension. EEG done. Per DR Yarelis Craven the patient has a new lacunar infarct. Continue po doxy for suppressive therapy. Stop iv zosyn. Plan to monitor off iv atb. The patient hets hemo [id number]. The patient was from [facility] return envelope done. Back door to select made. Keep appt for CT next week  Then see Dr Cuba 4 months with CMP and CT ab thanks

## 2024-05-23 NOTE — TELEPHONE ENCOUNTER
An angiography was performed of the [employer]. The angiography was performed via power injection.  Post-stent placement Message passed along to Dr. Farhda Elder.  Thanks

## (undated) DEVICE — INTENDED FOR TISSUE SEPARATION, AND OTHER PROCEDURES THAT REQUIRE A SHARP SURGICAL BLADE TO PUNCTURE OR CUT.: Brand: BARD-PARKER ® STAINLESS STEEL BLADES

## (undated) DEVICE — SWABSTICK MEDICATED L4IN BENZ TINC SKIN PREP APLICARE

## (undated) DEVICE — C-ARM: Brand: UNBRANDED

## (undated) DEVICE — DRAPE EQUIP CAM UNIV 96X7 IN LASER BX INSRT BLU ELASTIC FEN

## (undated) DEVICE — SOLUTION IRRIG 1000ML STRL H2O USP PLAS POUR BTL

## (undated) DEVICE — GRADUATE

## (undated) DEVICE — CLOTH SURG PREP PREOPERATIVE CHLORHEXIDINE GLUC 2% READYPREP

## (undated) DEVICE — GOWN,SIRUS,FABRNF,L,20/CS: Brand: MEDLINE

## (undated) DEVICE — SOLUTION IV 250ML 0.9% SOD CHL PH 5 INJ USP VIAFLX PLAS

## (undated) DEVICE — DRAPE,HAND,STERILE: Brand: MEDLINE

## (undated) DEVICE — CHLORAPREP 26ML ORANGE

## (undated) DEVICE — PACK PROCEDURE SURG RETINAL ST ES CUST

## (undated) DEVICE — TUBING, SUCTION, 3/16" X 12', STRAIGHT: Brand: MEDLINE

## (undated) DEVICE — TOWEL,OR,DSP,ST,BLUE,STD,6/PK,12PK/CS: Brand: MEDLINE

## (undated) DEVICE — ELECTRODE PT RET AD L9FT HI MOIST COND ADH HYDRGEL CORDED

## (undated) DEVICE — ARM SLING: Brand: DEROYAL

## (undated) DEVICE — PACK,LAPAROTOMY,NO GOWNS: Brand: MEDLINE

## (undated) DEVICE — GAUZE,SPONGE,4"X4",8PLY,STRL,LF,10/TRAY: Brand: MEDLINE

## (undated) DEVICE — PENCIL,CAUTERY,ROCKER,PTFE,15'CORD: Brand: MEDLINE INDUSTRIES, INC.

## (undated) DEVICE — CRADLE ARM W8.75XH12.5XL16IN FOAM SUPP ELEVATION VENT

## (undated) DEVICE — GOWN,AURORA,BRTHSLV,2XL,18/CS: Brand: MEDLINE

## (undated) DEVICE — DRAPE SURGICAL HAND PROX AURORA

## (undated) DEVICE — SURGICAL PROCEDURE PACK HND

## (undated) DEVICE — SYRINGE MED 10ML POLYPR LUERSLIP TIP FLAT TOP W/O SFTY DISP

## (undated) DEVICE — PROBE LSR 25GA DIR FOR VISION

## (undated) DEVICE — GLOVE SURG SZ 8 L12IN FNGR THK79MIL GRN LTX FREE

## (undated) DEVICE — DECANTER BAG 9": Brand: MEDLINE INDUSTRIES, INC.

## (undated) DEVICE — WAX SURG 2.5GM HEMSTAT BNE BEESWAX PARAFFIN ISO PALMITATE

## (undated) DEVICE — GLOVE ORTHO 8   MSG9480

## (undated) DEVICE — CONTROL SYRINGE LUER-LOCK TIP: Brand: MONOJECT

## (undated) DEVICE — GAUZE,SPONGE,4"X4",16PLY,XRAY,STRL,LF: Brand: MEDLINE

## (undated) DEVICE — SYRINGE MED 10ML LUERLOCK TIP W/O SFTY DISP

## (undated) DEVICE — Device

## (undated) DEVICE — 3M™ TEGADERM™ TRANSPARENT FILM DRESSING FRAME STYLE, 1626W, 4 IN X 4-3/4 IN (10 CM X 12 CM), 50/CT 4CT/CASE: Brand: 3M™ TEGADERM™

## (undated) DEVICE — 1.5L THIN WALL CAN: Brand: CRD

## (undated) DEVICE — NEEDLE HYPO 25GA L0.625IN BLU POLYPR HUB S STL REG BVL STR

## (undated) DEVICE — CANNULA INJ L2.5IN BLNT TIP 3MM CLR BODY W/ 1 W VLV DLP

## (undated) DEVICE — PATIENT RETURN ELECTRODE, SINGLE-USE, CONTACT QUALITY MONITORING, ADULT, WITH 9FT CORD, FOR PATIENTS WEIGING OVER 33LBS. (15KG): Brand: MEGADYNE

## (undated) DEVICE — LUMBAR LAMINECTOMY: Brand: MEDLINE INDUSTRIES, INC.

## (undated) DEVICE — SET VITRECTOMY

## (undated) DEVICE — DOUBLE BASIN SET: Brand: MEDLINE INDUSTRIES, INC.

## (undated) DEVICE — SOLUTION IV IRRIG WATER 1000ML POUR BRL 2F7114

## (undated) DEVICE — GLOVE SURG SZ 7 L12IN FNGR THK94MIL TRNSLUC YEL LTX HYDRGEL

## (undated) DEVICE — ADHESIVE SKIN CLSR 0.7ML TOP DERMBND ADV

## (undated) DEVICE — BIOM OPTIC SET DISPOSABLE, WITH BIOM HD FLEX LENS FOR F=175 MM OR F=200 MM: Brand: BIOM OPTIC SET DISPOSABLE, WITH BIOM HD FLEX LENS FOR F=175 MM OR F=200 MM

## (undated) DEVICE — DRILL SYSTEM 7

## (undated) DEVICE — MICROSURGICAL INSTRUMENT ANTERIOR CHAMBER CANNULA 30GA: Brand: ALCON

## (undated) DEVICE — 3M™ STERI-DRAPE™ INCISE DRAPE 1040 (35CM X 35CM): Brand: STERI-DRAPE™

## (undated) DEVICE — GLOVE ORANGE PI 7 1/2   MSG9075

## (undated) DEVICE — SET SURG BASIN MAYO REUSABLE

## (undated) DEVICE — SKIN AFFIX SURG ADHESIVE 72/CS 0.55ML: Brand: MEDLINE

## (undated) DEVICE — BLADE CLIPPER GEN PURP NS

## (undated) DEVICE — STANDARD HYPODERMIC NEEDLE,ALUMINUM HUB: Brand: MONOJECT

## (undated) DEVICE — PACK,UNIVERSAL,NO GOWNS: Brand: MEDLINE

## (undated) DEVICE — PENCIL ES L3M BTTN SWCH HOLSTER W/ BLDE ELECTRD EDGE

## (undated) DEVICE — MEDI-VAC NON-CONDUCTIVE SUCTION TUBING: Brand: CARDINAL HEALTH

## (undated) DEVICE — GEL US 20GM NONIRRITATING OVERWRAPPED FILE PCH TRNSMIT

## (undated) DEVICE — SPONGE,LAP,4"X18",XR,ST,5/PK,40PK/CS: Brand: MEDLINE INDUSTRIES, INC.

## (undated) DEVICE — CLAMP KELLY CURVED

## (undated) DEVICE — SURGICAL PROCEDURE PACK BASIC

## (undated) DEVICE — COUNTER NDL 30 COUNT DBL MAG

## (undated) DEVICE — ADAPTER CATH WHT DISP FOR ANES

## (undated) DEVICE — CLIP INT SM TI EZ LD LIG SYS WECK HORZ

## (undated) DEVICE — NEEDLE SPNL 22GA L5IN BLK HUB S STL W/ QNCKE PNT W/OUT

## (undated) DEVICE — 1000 S-DRAPE TOWEL DRAPE 10/BX: Brand: STERI-DRAPE™

## (undated) DEVICE — GLOVE SURG SZ 65 THK91MIL LTX FREE SYN POLYISOPRENE

## (undated) DEVICE — SYRINGE, LUER LOCK, 10ML: Brand: MEDLINE

## (undated) DEVICE — SET CLAMP NEONATAL VASCUALR

## (undated) DEVICE — SET SURG INSTR MINI VASC

## (undated) DEVICE — SURGICAL PROCEDURE PACK 25 GA VLV TOT +

## (undated) DEVICE — LOOP VES W25MM THK1MM MAXI RED SIL FLD REPELLENT 100 PER

## (undated) DEVICE — 3M™ COBAN™ NL STERILE NON-LATEX SELF-ADHERENT WRAP, 2084S, 4 IN X 5 YD (10 CM X 4,5 M), 18 ROLLS/CASE: Brand: 3M™ COBAN™

## (undated) DEVICE — DRAPE MICSCP FULL FLD STRL OCULUS ZEISS

## (undated) DEVICE — TOWEL,OR,DSP,ST,BLUE,DLX,4/PK,20PK/CS: Brand: MEDLINE

## (undated) DEVICE — SURGICAL PROCEDURE PACK VASC MAJ CUST

## (undated) DEVICE — BASIC SINGLE BASIN 1-LF: Brand: MEDLINE INDUSTRIES, INC.

## (undated) DEVICE — STETHOSCOPE FLX BELL SGL HD CHROM PLT

## (undated) DEVICE — STOCKINETTE,DOUBLE PLY,6X48,STERILE: Brand: MEDLINE

## (undated) DEVICE — NEEDLE FLTR 18GA L1.5IN MEM THK5UM BLNT DISP

## (undated) DEVICE — APPLICATOR PREP 6ML 0.7% IOD POVACRYLEX 74% ISO ALC

## (undated) DEVICE — INTRODUCER T15K ONE STEP OID BEVEL: Brand: KYPHON® ONE-STEP™ OSTEO INTRODUCER™ SYSTEM

## (undated) DEVICE — STRIP,CLOSURE,WOUND,MEDI-STRIP,1/4X3: Brand: MEDLINE

## (undated) DEVICE — DILATOR ART

## (undated) DEVICE — 3M™ MEDIPORE™ + PAD 3564: Brand: 3M™ MEDIPORE™

## (undated) DEVICE — 3M(TM) MEDIPORE(TM) +PAD SOFT CLOTH ADHESIVE WOUND DRESSING 3569: Brand: 3M™ MEDIPORE™

## (undated) DEVICE — GOWN,BREATHABLE SLV,AURORA,XLG,STRL: Brand: MEDLINE

## (undated) DEVICE — CONTAINER VACUTAINER ANAER CULTURE SWAB

## (undated) DEVICE — 40436 HEAD REST OCULAR: Brand: 40436 HEAD REST OCULAR

## (undated) DEVICE — PACK PROCEDURE SURG GEN CUST

## (undated) DEVICE — SHIELD EYE W3XL2.5IN UNIV CLR PLAS LTWT

## (undated) DEVICE — LABEL MED 4 IN SURG PANEL W/ PEN STRL

## (undated) DEVICE — DRAPE,LAPAROTOMY,PCH,STERILE: Brand: MEDLINE

## (undated) DEVICE — Z INACTIVE USE 2635504 SOLUTION IRRIG 3000ML 1.5% GL USP UROMATIC CONT

## (undated) DEVICE — GLASS MEDICINE ST

## (undated) DEVICE — THE MILL DISPOSABLE - FINE

## (undated) DEVICE — SET SPINAL NEURO STNEU1

## (undated) DEVICE — CAUTERY BPLR 25GA INTOCU W/ HNDPC CRD DISP FOR CX9404

## (undated) DEVICE — COVER HNDL LT DISP

## (undated) DEVICE — PAD GZ BORD ST 4INX10IN 2X8IN

## (undated) DEVICE — BANDAGE,GAUZE,BULKEE II,2.25"X3YD,STRL: Brand: MEDLINE

## (undated) DEVICE — TOWEL SURG W16XL26IN WHT NONFENESTRATED ST 4 PER PK

## (undated) DEVICE — EXTRAS KOHLI

## (undated) DEVICE — NEEDLE HYPO 21GA L1.5IN GRN POLYPR HUB S STL REG BVL STR

## (undated) DEVICE — MARKER,SKIN,WI/RULER AND LABELS: Brand: MEDLINE

## (undated) DEVICE — DRESSING ADH N ADH 8X35 IN 6X175 IN SFT CLTH MEDIPORE +

## (undated) DEVICE — SHEET,DRAPE,40X58,STERILE: Brand: MEDLINE

## (undated) DEVICE — TRAP SPEC MUCUS FOR SUCT

## (undated) DEVICE — MICROSURGICAL INSTRUMENT 25GA SOFT TIP CANNULA, DSP, 0.8MM: Brand: ALCON

## (undated) DEVICE — CURITY FLEXIBLE ADHESIVE BANDAGE: Brand: CURITY

## (undated) DEVICE — SOLUTION IV IRRIG POUR BRL 0.9% SODIUM CHL 2F7124

## (undated) DEVICE — TRAP,MUCUS SPECIMEN,40CC: Brand: MEDLINE

## (undated) DEVICE — NEEDLE SPNL L3.5IN PNK HUB S STL REG WALL FIT STYL W/ QNCKE

## (undated) DEVICE — SWAB SPEC COLL SHFT L5.25IN POLYUR FOAM TIP SFT DBL MEDIA

## (undated) DEVICE — 4-PORT MANIFOLD: Brand: NEPTUNE 2

## (undated) DEVICE — GOWN,BREATHABLE,IMP SLV 3XL AURORA: Brand: MEDLINE

## (undated) DEVICE — JACKSON TABLE POSITIONER KIT: Brand: MEDLINE INDUSTRIES, INC.

## (undated) DEVICE — CORD,CAUTERY,BIPOLAR,STERILE: Brand: MEDLINE

## (undated) DEVICE — SPLINT ORTH W4XL15IN PLSTR OF PARIS LO EXOTHERM SMOOTH

## (undated) DEVICE — MICROSURGICAL INSTRUMENT RETROBULAR NEEDLE 25GA X 38MM (1 1/2 IN): Brand: ALCON

## (undated) DEVICE — COVER,MAYO STAND,STERILE: Brand: MEDLINE

## (undated) DEVICE — Z INACTIVE USE 2641837 CLIP LIG M BLU TI HRT SHP WIRE HORZ 600 PER BX

## (undated) DEVICE — NEEDLE HYPO 25GA L1.5IN BLU POLYPR HUB S STL REG BVL STR

## (undated) DEVICE — FEEDING TUBE • RADIOPAQUE: Brand: ARGYLE

## (undated) DEVICE — PADDING,UNDERCAST,COTTON, 3X4YD STERILE: Brand: MEDLINE

## (undated) DEVICE — SYRINGE MED 20ML STD CLR PLAS LUERLOCK TIP N CTRL DISP

## (undated) DEVICE — 3M™ STERI-DRAPE™ INCISE DRAPE 1050 (60CM X 45CM): Brand: STERI-DRAPE™

## (undated) DEVICE — APPLICATOR PREP 26ML 0.7% IOD POVACRYLEX 74% ISO ALC ST

## (undated) DEVICE — MASTISOL ADHESIVE LIQ 2/3ML

## (undated) DEVICE — LOCATOR RAD PASS SPHR MRK NAVIGATION BALL DISP STLTH STN

## (undated) DEVICE — VESSEL LOOPS,MAXI, RED: Brand: DEVON

## (undated) DEVICE — PAD,ABDOMINAL,5"X9",ST,LF,25/BX: Brand: MEDLINE INDUSTRIES, INC.

## (undated) DEVICE — YANKAUER,OPEN TIP,W/O VENT,STERILE: Brand: MEDLINE INDUSTRIES, INC.

## (undated) DEVICE — SYRINGE, LUER LOCK, 5ML: Brand: MEDLINE

## (undated) DEVICE — SPHERE EYE 1 MRK GUIDANCE PASS STEALTHSTATION 1PK/EA